# Patient Record
Sex: FEMALE | Race: ASIAN | NOT HISPANIC OR LATINO | Employment: UNEMPLOYED | URBAN - METROPOLITAN AREA
[De-identification: names, ages, dates, MRNs, and addresses within clinical notes are randomized per-mention and may not be internally consistent; named-entity substitution may affect disease eponyms.]

---

## 2017-01-18 ENCOUNTER — OFFICE VISIT - HEALTHEAST (OUTPATIENT)
Dept: FAMILY MEDICINE | Facility: CLINIC | Age: 47
End: 2017-01-18

## 2017-01-18 DIAGNOSIS — K12.1 ORAL ULCER: ICD-10-CM

## 2017-01-18 DIAGNOSIS — J45.41 MODERATE PERSISTENT ASTHMA WITH ACUTE EXACERBATION: ICD-10-CM

## 2017-01-18 ASSESSMENT — MIFFLIN-ST. JEOR: SCORE: 1104.27

## 2017-03-15 ENCOUNTER — OFFICE VISIT - HEALTHEAST (OUTPATIENT)
Dept: FAMILY MEDICINE | Facility: CLINIC | Age: 47
End: 2017-03-15

## 2017-03-15 DIAGNOSIS — J45.40 MODERATE PERSISTENT ASTHMA WITHOUT COMPLICATION: ICD-10-CM

## 2017-03-15 DIAGNOSIS — K21.9 GERD (GASTROESOPHAGEAL REFLUX DISEASE): ICD-10-CM

## 2017-03-15 DIAGNOSIS — I10 ESSENTIAL HYPERTENSION: ICD-10-CM

## 2017-03-15 ASSESSMENT — MIFFLIN-ST. JEOR: SCORE: 1106.88

## 2017-04-14 ENCOUNTER — OFFICE VISIT - HEALTHEAST (OUTPATIENT)
Dept: FAMILY MEDICINE | Facility: CLINIC | Age: 47
End: 2017-04-14

## 2017-04-14 ENCOUNTER — AMBULATORY - HEALTHEAST (OUTPATIENT)
Dept: PULMONOLOGY | Facility: OTHER | Age: 47
End: 2017-04-14

## 2017-04-14 DIAGNOSIS — J45.909 ASTHMA: ICD-10-CM

## 2017-04-14 DIAGNOSIS — R52 PAIN: ICD-10-CM

## 2017-04-14 DIAGNOSIS — R25.2 MUSCLE CRAMPS: ICD-10-CM

## 2017-04-14 DIAGNOSIS — I10 ESSENTIAL HYPERTENSION: ICD-10-CM

## 2017-04-14 ASSESSMENT — MIFFLIN-ST. JEOR: SCORE: 1159.27

## 2017-04-17 ENCOUNTER — RECORDS - HEALTHEAST (OUTPATIENT)
Dept: ADMINISTRATIVE | Facility: OTHER | Age: 47
End: 2017-04-17

## 2017-04-17 ENCOUNTER — RECORDS - HEALTHEAST (OUTPATIENT)
Dept: PULMONOLOGY | Facility: OTHER | Age: 47
End: 2017-04-17

## 2017-04-17 ENCOUNTER — OFFICE VISIT - HEALTHEAST (OUTPATIENT)
Dept: PULMONOLOGY | Facility: OTHER | Age: 47
End: 2017-04-17

## 2017-04-17 DIAGNOSIS — R91.1 PULMONARY NODULE: ICD-10-CM

## 2017-04-17 DIAGNOSIS — R06.09 DYSPNEA ON EXERTION: ICD-10-CM

## 2017-04-17 DIAGNOSIS — J45.909 UNSPECIFIED ASTHMA, UNCOMPLICATED: ICD-10-CM

## 2017-04-17 DIAGNOSIS — J45.40 MODERATE PERSISTENT ASTHMA WITHOUT COMPLICATION: ICD-10-CM

## 2017-04-17 DIAGNOSIS — K21.9 GASTROESOPHAGEAL REFLUX DISEASE, ESOPHAGITIS PRESENCE NOT SPECIFIED: ICD-10-CM

## 2017-04-17 ASSESSMENT — MIFFLIN-ST. JEOR: SCORE: 1136.59

## 2017-04-30 ENCOUNTER — COMMUNICATION - HEALTHEAST (OUTPATIENT)
Dept: FAMILY MEDICINE | Facility: CLINIC | Age: 47
End: 2017-04-30

## 2017-04-30 DIAGNOSIS — J45.41 MODERATE PERSISTENT ASTHMA WITH ACUTE EXACERBATION: ICD-10-CM

## 2017-05-28 ENCOUNTER — COMMUNICATION - HEALTHEAST (OUTPATIENT)
Dept: FAMILY MEDICINE | Facility: CLINIC | Age: 47
End: 2017-05-28

## 2017-05-28 DIAGNOSIS — J45.41 MODERATE PERSISTENT ASTHMA WITH ACUTE EXACERBATION: ICD-10-CM

## 2017-05-31 ENCOUNTER — OFFICE VISIT - HEALTHEAST (OUTPATIENT)
Dept: FAMILY MEDICINE | Facility: CLINIC | Age: 47
End: 2017-05-31

## 2017-05-31 DIAGNOSIS — R52 PAIN: ICD-10-CM

## 2017-05-31 DIAGNOSIS — J45.41 MODERATE PERSISTENT ASTHMA WITH ACUTE EXACERBATION: ICD-10-CM

## 2017-05-31 ASSESSMENT — MIFFLIN-ST. JEOR: SCORE: 1170.16

## 2017-06-13 ENCOUNTER — COMMUNICATION - HEALTHEAST (OUTPATIENT)
Dept: FAMILY MEDICINE | Facility: CLINIC | Age: 47
End: 2017-06-13

## 2017-06-14 ENCOUNTER — RECORDS - HEALTHEAST (OUTPATIENT)
Dept: ADMINISTRATIVE | Facility: OTHER | Age: 47
End: 2017-06-14

## 2017-06-21 ENCOUNTER — OFFICE VISIT - HEALTHEAST (OUTPATIENT)
Dept: FAMILY MEDICINE | Facility: CLINIC | Age: 47
End: 2017-06-21

## 2017-06-21 DIAGNOSIS — K21.9 GERD (GASTROESOPHAGEAL REFLUX DISEASE): ICD-10-CM

## 2017-06-21 DIAGNOSIS — I10 ESSENTIAL HYPERTENSION: ICD-10-CM

## 2017-06-21 DIAGNOSIS — J45.41 MODERATE PERSISTENT ASTHMA WITH ACUTE EXACERBATION: ICD-10-CM

## 2017-06-28 ENCOUNTER — COMMUNICATION - HEALTHEAST (OUTPATIENT)
Dept: FAMILY MEDICINE | Facility: CLINIC | Age: 47
End: 2017-06-28

## 2017-06-28 DIAGNOSIS — J45.41 MODERATE PERSISTENT ASTHMA WITH ACUTE EXACERBATION: ICD-10-CM

## 2017-06-29 ENCOUNTER — COMMUNICATION - HEALTHEAST (OUTPATIENT)
Dept: FAMILY MEDICINE | Facility: CLINIC | Age: 47
End: 2017-06-29

## 2017-06-29 DIAGNOSIS — J45.41 MODERATE PERSISTENT ASTHMA WITH ACUTE EXACERBATION: ICD-10-CM

## 2017-07-03 ENCOUNTER — COMMUNICATION - HEALTHEAST (OUTPATIENT)
Dept: FAMILY MEDICINE | Facility: CLINIC | Age: 47
End: 2017-07-03

## 2017-07-05 ENCOUNTER — AMBULATORY - HEALTHEAST (OUTPATIENT)
Dept: FAMILY MEDICINE | Facility: CLINIC | Age: 47
End: 2017-07-05

## 2017-07-05 DIAGNOSIS — J45.41 MODERATE PERSISTENT ASTHMA WITH ACUTE EXACERBATION: ICD-10-CM

## 2017-07-19 ENCOUNTER — OFFICE VISIT - HEALTHEAST (OUTPATIENT)
Dept: FAMILY MEDICINE | Facility: CLINIC | Age: 47
End: 2017-07-19

## 2017-07-19 DIAGNOSIS — K21.9 GERD (GASTROESOPHAGEAL REFLUX DISEASE): ICD-10-CM

## 2017-07-19 DIAGNOSIS — R73.9 BLOOD GLUCOSE ELEVATED: ICD-10-CM

## 2017-07-19 DIAGNOSIS — R25.2 MUSCLE CRAMPS: ICD-10-CM

## 2017-07-19 LAB — HBA1C MFR BLD: 6.4 % (ref 3.5–6)

## 2017-07-19 ASSESSMENT — MIFFLIN-ST. JEOR: SCORE: 1163.81

## 2017-08-30 ENCOUNTER — COMMUNICATION - HEALTHEAST (OUTPATIENT)
Dept: FAMILY MEDICINE | Facility: CLINIC | Age: 47
End: 2017-08-30

## 2017-08-30 DIAGNOSIS — J45.41 MODERATE PERSISTENT ASTHMA WITH ACUTE EXACERBATION: ICD-10-CM

## 2017-08-31 ENCOUNTER — OFFICE VISIT - HEALTHEAST (OUTPATIENT)
Dept: FAMILY MEDICINE | Facility: CLINIC | Age: 47
End: 2017-08-31

## 2017-08-31 DIAGNOSIS — Z00.00 ROUTINE GENERAL MEDICAL EXAMINATION AT A HEALTH CARE FACILITY: ICD-10-CM

## 2017-08-31 DIAGNOSIS — I10 ESSENTIAL HYPERTENSION: ICD-10-CM

## 2017-08-31 DIAGNOSIS — J40 BRONCHITIS: ICD-10-CM

## 2017-08-31 DIAGNOSIS — Z12.11 SCREENING FOR COLON CANCER: ICD-10-CM

## 2017-08-31 DIAGNOSIS — E87.6 HYPOKALEMIA: ICD-10-CM

## 2017-08-31 DIAGNOSIS — R25.2 MUSCLE CRAMPS: ICD-10-CM

## 2017-08-31 DIAGNOSIS — J45.41 MODERATE PERSISTENT ASTHMA WITH ACUTE EXACERBATION: ICD-10-CM

## 2017-08-31 LAB
CHOLEST SERPL-MCNC: 239 MG/DL
FASTING STATUS PATIENT QL REPORTED: NO
HDLC SERPL-MCNC: 55 MG/DL
LDLC SERPL CALC-MCNC: 139 MG/DL
TRIGL SERPL-MCNC: 227 MG/DL

## 2017-08-31 ASSESSMENT — MIFFLIN-ST. JEOR: SCORE: 1126.18

## 2017-09-09 LAB
HPV INTERPRETATION - HISTORICAL: NORMAL
HPV INTERPRETER - HISTORICAL: NORMAL

## 2017-09-13 ENCOUNTER — COMMUNICATION - HEALTHEAST (OUTPATIENT)
Dept: FAMILY MEDICINE | Facility: CLINIC | Age: 47
End: 2017-09-13

## 2017-09-30 ENCOUNTER — COMMUNICATION - HEALTHEAST (OUTPATIENT)
Dept: FAMILY MEDICINE | Facility: CLINIC | Age: 47
End: 2017-09-30

## 2017-09-30 DIAGNOSIS — I10 ESSENTIAL HYPERTENSION: ICD-10-CM

## 2017-10-04 ENCOUNTER — OFFICE VISIT - HEALTHEAST (OUTPATIENT)
Dept: FAMILY MEDICINE | Facility: CLINIC | Age: 47
End: 2017-10-04

## 2017-10-04 ENCOUNTER — COMMUNICATION - HEALTHEAST (OUTPATIENT)
Dept: FAMILY MEDICINE | Facility: CLINIC | Age: 47
End: 2017-10-04

## 2017-10-04 ENCOUNTER — AMBULATORY - HEALTHEAST (OUTPATIENT)
Dept: LAB | Facility: CLINIC | Age: 47
End: 2017-10-04

## 2017-10-04 DIAGNOSIS — Z12.11 SCREEN FOR COLON CANCER: ICD-10-CM

## 2017-10-04 DIAGNOSIS — R73.9 BLOOD GLUCOSE ELEVATED: ICD-10-CM

## 2017-10-04 DIAGNOSIS — J45.41 MODERATE PERSISTENT ASTHMA WITH ACUTE EXACERBATION: ICD-10-CM

## 2017-10-04 DIAGNOSIS — J45.40 MODERATE PERSISTENT ASTHMA WITHOUT COMPLICATION: ICD-10-CM

## 2017-10-04 DIAGNOSIS — Z23 NEED FOR INFLUENZA VACCINATION: ICD-10-CM

## 2017-10-04 LAB — HBA1C MFR BLD: 6.4 % (ref 3.5–6)

## 2017-10-04 ASSESSMENT — MIFFLIN-ST. JEOR: SCORE: 1122.77

## 2017-11-15 ENCOUNTER — OFFICE VISIT - HEALTHEAST (OUTPATIENT)
Dept: FAMILY MEDICINE | Facility: CLINIC | Age: 47
End: 2017-11-15

## 2017-11-15 DIAGNOSIS — J45.909 ASTHMA WITHOUT STATUS ASTHMATICUS: ICD-10-CM

## 2017-11-15 DIAGNOSIS — I10 HTN (HYPERTENSION): ICD-10-CM

## 2017-11-15 DIAGNOSIS — R52 PAIN: ICD-10-CM

## 2017-11-15 DIAGNOSIS — R73.03 PREDIABETES: ICD-10-CM

## 2017-11-15 ASSESSMENT — MIFFLIN-ST. JEOR: SCORE: 1137.23

## 2017-11-19 ENCOUNTER — HEALTH MAINTENANCE LETTER (OUTPATIENT)
Age: 47
End: 2017-11-19

## 2017-11-24 ENCOUNTER — COMMUNICATION - HEALTHEAST (OUTPATIENT)
Dept: FAMILY MEDICINE | Facility: CLINIC | Age: 47
End: 2017-11-24

## 2017-11-24 DIAGNOSIS — J45.41 MODERATE PERSISTENT ASTHMA WITH ACUTE EXACERBATION: ICD-10-CM

## 2017-11-28 ENCOUNTER — OFFICE VISIT - HEALTHEAST (OUTPATIENT)
Dept: PULMONOLOGY | Facility: OTHER | Age: 47
End: 2017-11-28

## 2017-11-28 DIAGNOSIS — R91.8 MULTIPLE PULMONARY NODULES: ICD-10-CM

## 2017-11-28 DIAGNOSIS — J45.41 MODERATE PERSISTENT ASTHMA WITH ACUTE EXACERBATION: ICD-10-CM

## 2017-11-28 DIAGNOSIS — J45.40 MODERATE PERSISTENT ASTHMA WITHOUT COMPLICATION: ICD-10-CM

## 2017-11-28 DIAGNOSIS — J41.0 SIMPLE CHRONIC BRONCHITIS (H): ICD-10-CM

## 2017-11-29 ENCOUNTER — COMMUNICATION - HEALTHEAST (OUTPATIENT)
Dept: FAMILY MEDICINE | Facility: CLINIC | Age: 47
End: 2017-11-29

## 2017-11-29 ENCOUNTER — AMBULATORY - HEALTHEAST (OUTPATIENT)
Dept: PULMONOLOGY | Facility: OTHER | Age: 47
End: 2017-11-29

## 2017-11-29 DIAGNOSIS — J40 BRONCHITIS: ICD-10-CM

## 2017-12-12 ENCOUNTER — COMMUNICATION - HEALTHEAST (OUTPATIENT)
Dept: FAMILY MEDICINE | Facility: CLINIC | Age: 47
End: 2017-12-12

## 2017-12-12 DIAGNOSIS — K21.9 GERD (GASTROESOPHAGEAL REFLUX DISEASE): ICD-10-CM

## 2017-12-22 ENCOUNTER — COMMUNICATION - HEALTHEAST (OUTPATIENT)
Dept: FAMILY MEDICINE | Facility: CLINIC | Age: 47
End: 2017-12-22

## 2017-12-26 ENCOUNTER — RECORDS - HEALTHEAST (OUTPATIENT)
Dept: ADMINISTRATIVE | Facility: OTHER | Age: 47
End: 2017-12-26

## 2017-12-27 ENCOUNTER — RECORDS - HEALTHEAST (OUTPATIENT)
Dept: ADMINISTRATIVE | Facility: OTHER | Age: 47
End: 2017-12-27

## 2017-12-28 ENCOUNTER — RECORDS - HEALTHEAST (OUTPATIENT)
Dept: ADMINISTRATIVE | Facility: OTHER | Age: 47
End: 2017-12-28

## 2017-12-29 ENCOUNTER — AMBULATORY - HEALTHEAST (OUTPATIENT)
Dept: CARDIOLOGY | Facility: CLINIC | Age: 47
End: 2017-12-29

## 2017-12-29 ENCOUNTER — RECORDS - HEALTHEAST (OUTPATIENT)
Dept: ADMINISTRATIVE | Facility: OTHER | Age: 47
End: 2017-12-29

## 2017-12-29 DIAGNOSIS — I47.10 SVT (SUPRAVENTRICULAR TACHYCARDIA) (H): ICD-10-CM

## 2017-12-29 DIAGNOSIS — R06.02 SOB (SHORTNESS OF BREATH): ICD-10-CM

## 2017-12-29 DIAGNOSIS — R07.9 CHEST PAIN: ICD-10-CM

## 2017-12-31 ENCOUNTER — COMMUNICATION - HEALTHEAST (OUTPATIENT)
Dept: FAMILY MEDICINE | Facility: CLINIC | Age: 47
End: 2017-12-31

## 2018-01-03 ENCOUNTER — OFFICE VISIT - HEALTHEAST (OUTPATIENT)
Dept: FAMILY MEDICINE | Facility: CLINIC | Age: 48
End: 2018-01-03

## 2018-01-03 DIAGNOSIS — J45.909 ASTHMA WITHOUT STATUS ASTHMATICUS: ICD-10-CM

## 2018-01-03 DIAGNOSIS — Z09 HOSPITAL DISCHARGE FOLLOW-UP: ICD-10-CM

## 2018-01-03 DIAGNOSIS — I47.10 SVT (SUPRAVENTRICULAR TACHYCARDIA) (H): ICD-10-CM

## 2018-01-03 DIAGNOSIS — I10 HTN (HYPERTENSION): ICD-10-CM

## 2018-01-03 ASSESSMENT — MIFFLIN-ST. JEOR: SCORE: 1174.3

## 2018-01-07 ENCOUNTER — COMMUNICATION - HEALTHEAST (OUTPATIENT)
Dept: FAMILY MEDICINE | Facility: CLINIC | Age: 48
End: 2018-01-07

## 2018-01-09 ENCOUNTER — COMMUNICATION - HEALTHEAST (OUTPATIENT)
Dept: FAMILY MEDICINE | Facility: CLINIC | Age: 48
End: 2018-01-09

## 2018-01-09 ENCOUNTER — COMMUNICATION - HEALTHEAST (OUTPATIENT)
Dept: CARDIOLOGY | Facility: CLINIC | Age: 48
End: 2018-01-09

## 2018-01-10 ENCOUNTER — OFFICE VISIT - HEALTHEAST (OUTPATIENT)
Dept: FAMILY MEDICINE | Facility: CLINIC | Age: 48
End: 2018-01-10

## 2018-01-10 DIAGNOSIS — R73.03 PREDIABETES: ICD-10-CM

## 2018-01-10 DIAGNOSIS — I47.10 SVT (SUPRAVENTRICULAR TACHYCARDIA) (H): ICD-10-CM

## 2018-01-10 DIAGNOSIS — R00.2 PALPITATION: ICD-10-CM

## 2018-01-10 DIAGNOSIS — J45.909 ASTHMA WITHOUT STATUS ASTHMATICUS: ICD-10-CM

## 2018-01-10 LAB
FASTING STATUS PATIENT QL REPORTED: NO
GLUCOSE BLD-MCNC: 91 MG/DL (ref 74–125)
HBA1C MFR BLD: 6.2 % (ref 3.5–6)

## 2018-01-10 ASSESSMENT — MIFFLIN-ST. JEOR: SCORE: 1170.04

## 2018-01-11 ENCOUNTER — HOSPITAL ENCOUNTER (OUTPATIENT)
Dept: CT IMAGING | Facility: CLINIC | Age: 48
Discharge: HOME | End: 2018-01-11
Attending: INTERNAL MEDICINE

## 2018-01-11 DIAGNOSIS — R07.9 CHEST PAIN: ICD-10-CM

## 2018-01-11 DIAGNOSIS — I47.10 SVT (SUPRAVENTRICULAR TACHYCARDIA) (H): ICD-10-CM

## 2018-01-11 DIAGNOSIS — R06.02 SOB (SHORTNESS OF BREATH): ICD-10-CM

## 2018-01-11 LAB
BSA FOR ECHO PROCEDURE: 1.63 M2
CV CALCIUM SCORE AGATSTON LM: 0
CV CALCIUM SCORING AGATSON LAD: 146
CV CALCIUM SCORING AGATSTON CX: 0
CV CALCIUM SCORING AGATSTON RCA: 58
CV CALCIUM SCORING AGATSTON TOTAL: 204
LEFT VENTRICLE HEART RATE: 80 BPM

## 2018-01-11 ASSESSMENT — MIFFLIN-ST. JEOR: SCORE: 1172.88

## 2018-01-12 ENCOUNTER — COMMUNICATION - HEALTHEAST (OUTPATIENT)
Dept: CARDIOLOGY | Facility: CLINIC | Age: 48
End: 2018-01-12

## 2018-01-12 ENCOUNTER — AMBULATORY - HEALTHEAST (OUTPATIENT)
Dept: CARDIOLOGY | Facility: CLINIC | Age: 48
End: 2018-01-12

## 2018-01-12 DIAGNOSIS — R93.1 ABNORMAL FINDINGS ON DIAGNOSTIC IMAGING OF HEART/CORONARY CIRCULATION: ICD-10-CM

## 2018-01-18 ENCOUNTER — AMBULATORY - HEALTHEAST (OUTPATIENT)
Dept: CARDIOLOGY | Facility: CLINIC | Age: 48
End: 2018-01-18

## 2018-01-18 ENCOUNTER — SURGERY - HEALTHEAST (OUTPATIENT)
Dept: CARDIOLOGY | Facility: CLINIC | Age: 48
End: 2018-01-18

## 2018-01-23 ENCOUNTER — OFFICE VISIT - HEALTHEAST (OUTPATIENT)
Dept: CARDIOLOGY | Facility: CLINIC | Age: 48
End: 2018-01-23

## 2018-01-23 DIAGNOSIS — R07.2 PRECORDIAL PAIN: ICD-10-CM

## 2018-01-23 DIAGNOSIS — E78.5 HYPERLIPIDEMIA: ICD-10-CM

## 2018-01-23 DIAGNOSIS — R93.1 ABNORMAL FINDINGS ON DIAGNOSTIC IMAGING OF HEART/CORONARY CIRCULATION: ICD-10-CM

## 2018-01-23 DIAGNOSIS — I10 ESSENTIAL HYPERTENSION: ICD-10-CM

## 2018-01-23 DIAGNOSIS — I47.10 SVT (SUPRAVENTRICULAR TACHYCARDIA) (H): ICD-10-CM

## 2018-01-23 ASSESSMENT — MIFFLIN-ST. JEOR: SCORE: 1150.2

## 2018-01-24 LAB
ATRIAL RATE - MUSE: 74 BPM
DIASTOLIC BLOOD PRESSURE - MUSE: NORMAL MMHG
INTERPRETATION ECG - MUSE: NORMAL
P AXIS - MUSE: 5 DEGREES
PR INTERVAL - MUSE: 156 MS
QRS DURATION - MUSE: 92 MS
QT - MUSE: 396 MS
QTC - MUSE: 439 MS
R AXIS - MUSE: -8 DEGREES
SYSTOLIC BLOOD PRESSURE - MUSE: NORMAL MMHG
T AXIS - MUSE: 31 DEGREES
VENTRICULAR RATE- MUSE: 74 BPM

## 2018-01-25 ENCOUNTER — SURGERY - HEALTHEAST (OUTPATIENT)
Dept: CARDIOLOGY | Facility: CLINIC | Age: 48
End: 2018-01-25

## 2018-01-25 ASSESSMENT — MIFFLIN-ST. JEOR
SCORE: 1159.27
SCORE: 1158.82

## 2018-01-26 ASSESSMENT — MIFFLIN-ST. JEOR: SCORE: 1162.44

## 2018-01-27 ASSESSMENT — MIFFLIN-ST. JEOR: SCORE: 1152.92

## 2018-01-29 ENCOUNTER — AMBULATORY - HEALTHEAST (OUTPATIENT)
Dept: LAB | Facility: CLINIC | Age: 48
End: 2018-01-29

## 2018-01-29 ENCOUNTER — COMMUNICATION - HEALTHEAST (OUTPATIENT)
Dept: FAMILY MEDICINE | Facility: CLINIC | Age: 48
End: 2018-01-29

## 2018-01-29 DIAGNOSIS — I25.119 CORONARY ARTERY DISEASE INVOLVING NATIVE CORONARY ARTERY OF NATIVE HEART WITH ANGINA PECTORIS (H): ICD-10-CM

## 2018-01-29 LAB — HGB BLD-MCNC: 11.6 G/DL (ref 12–16)

## 2018-02-02 ENCOUNTER — OFFICE VISIT - HEALTHEAST (OUTPATIENT)
Dept: FAMILY MEDICINE | Facility: CLINIC | Age: 48
End: 2018-02-02

## 2018-02-02 ENCOUNTER — COMMUNICATION - HEALTHEAST (OUTPATIENT)
Dept: FAMILY MEDICINE | Facility: CLINIC | Age: 48
End: 2018-02-02

## 2018-02-02 DIAGNOSIS — Z09 HOSPITAL DISCHARGE FOLLOW-UP: ICD-10-CM

## 2018-02-02 DIAGNOSIS — Z95.5 S/P CORONARY ARTERY STENT PLACEMENT: ICD-10-CM

## 2018-02-02 DIAGNOSIS — K68.3 RETROPERITONEAL HEMATOMA: ICD-10-CM

## 2018-02-02 DIAGNOSIS — I25.119 CORONARY ARTERY DISEASE INVOLVING NATIVE CORONARY ARTERY OF NATIVE HEART WITH ANGINA PECTORIS (H): ICD-10-CM

## 2018-02-02 DIAGNOSIS — I47.10 SVT (SUPRAVENTRICULAR TACHYCARDIA) (H): ICD-10-CM

## 2018-02-02 LAB
ERYTHROCYTE [DISTWIDTH] IN BLOOD BY AUTOMATED COUNT: 16.1 % (ref 11–14.5)
HCT VFR BLD AUTO: 36.1 % (ref 35–47)
HGB BLD-MCNC: 11.4 G/DL (ref 12–16)
MCH RBC QN AUTO: 25.6 PG (ref 27–34)
MCHC RBC AUTO-ENTMCNC: 31.6 G/DL (ref 32–36)
MCV RBC AUTO: 81 FL (ref 80–100)
PLATELET # BLD AUTO: 313 THOU/UL (ref 140–440)
PMV BLD AUTO: 8.5 FL (ref 7–10)
RBC # BLD AUTO: 4.46 MILL/UL (ref 3.8–5.4)
WBC: 10.2 THOU/UL (ref 4–11)

## 2018-02-02 ASSESSMENT — MIFFLIN-ST. JEOR: SCORE: 1175.71

## 2018-02-08 ENCOUNTER — OFFICE VISIT - HEALTHEAST (OUTPATIENT)
Dept: CARDIOLOGY | Facility: CLINIC | Age: 48
End: 2018-02-08

## 2018-02-08 ENCOUNTER — AMBULATORY - HEALTHEAST (OUTPATIENT)
Dept: CARDIAC REHAB | Facility: HOSPITAL | Age: 48
End: 2018-02-08

## 2018-02-08 DIAGNOSIS — J44.9 CHRONIC OBSTRUCTIVE PULMONARY DISEASE, UNSPECIFIED COPD TYPE (H): ICD-10-CM

## 2018-02-08 DIAGNOSIS — K21.9 GASTROESOPHAGEAL REFLUX DISEASE, ESOPHAGITIS PRESENCE NOT SPECIFIED: ICD-10-CM

## 2018-02-08 DIAGNOSIS — Z95.5 S/P CORONARY ARTERY STENT PLACEMENT: ICD-10-CM

## 2018-02-08 DIAGNOSIS — I25.119 CORONARY ARTERY DISEASE INVOLVING NATIVE CORONARY ARTERY OF NATIVE HEART WITH ANGINA PECTORIS (H): ICD-10-CM

## 2018-02-08 DIAGNOSIS — Z95.5 STENTED CORONARY ARTERY: ICD-10-CM

## 2018-02-08 DIAGNOSIS — I10 ESSENTIAL HYPERTENSION: ICD-10-CM

## 2018-02-08 ASSESSMENT — MIFFLIN-ST. JEOR: SCORE: 1160.18

## 2018-02-12 ENCOUNTER — AMBULATORY - HEALTHEAST (OUTPATIENT)
Dept: CARDIAC REHAB | Facility: HOSPITAL | Age: 48
End: 2018-02-12

## 2018-02-12 DIAGNOSIS — Z95.5 S/P CORONARY ARTERY STENT PLACEMENT: ICD-10-CM

## 2018-02-14 ENCOUNTER — AMBULATORY - HEALTHEAST (OUTPATIENT)
Dept: CARDIAC REHAB | Facility: HOSPITAL | Age: 48
End: 2018-02-14

## 2018-02-14 ENCOUNTER — COMMUNICATION - HEALTHEAST (OUTPATIENT)
Dept: FAMILY MEDICINE | Facility: CLINIC | Age: 48
End: 2018-02-14

## 2018-02-14 ENCOUNTER — AMBULATORY - HEALTHEAST (OUTPATIENT)
Dept: FAMILY MEDICINE | Facility: CLINIC | Age: 48
End: 2018-02-14

## 2018-02-14 DIAGNOSIS — Z95.5 S/P CORONARY ARTERY STENT PLACEMENT: ICD-10-CM

## 2018-02-14 DIAGNOSIS — J44.9 COPD (CHRONIC OBSTRUCTIVE PULMONARY DISEASE) (H): ICD-10-CM

## 2018-02-16 ENCOUNTER — COMMUNICATION - HEALTHEAST (OUTPATIENT)
Dept: FAMILY MEDICINE | Facility: CLINIC | Age: 48
End: 2018-02-16

## 2018-02-16 DIAGNOSIS — R73.9 HYPERGLYCEMIA: ICD-10-CM

## 2018-02-16 DIAGNOSIS — K68.3 RETROPERITONEAL HEMATOMA: ICD-10-CM

## 2018-02-19 ENCOUNTER — OFFICE VISIT - HEALTHEAST (OUTPATIENT)
Dept: FAMILY MEDICINE | Facility: CLINIC | Age: 48
End: 2018-02-19

## 2018-02-19 DIAGNOSIS — J44.9 CHRONIC OBSTRUCTIVE PULMONARY DISEASE, UNSPECIFIED COPD TYPE (H): ICD-10-CM

## 2018-02-19 DIAGNOSIS — R05.9 COUGH: ICD-10-CM

## 2018-02-19 DIAGNOSIS — J98.01 BRONCHOSPASM: ICD-10-CM

## 2018-02-19 ASSESSMENT — MIFFLIN-ST. JEOR: SCORE: 1159.27

## 2018-02-21 ENCOUNTER — AMBULATORY - HEALTHEAST (OUTPATIENT)
Dept: FAMILY MEDICINE | Facility: CLINIC | Age: 48
End: 2018-02-21

## 2018-02-21 ENCOUNTER — AMBULATORY - HEALTHEAST (OUTPATIENT)
Dept: CARDIAC REHAB | Facility: HOSPITAL | Age: 48
End: 2018-02-21

## 2018-02-21 DIAGNOSIS — Z95.5 STENTED CORONARY ARTERY: ICD-10-CM

## 2018-02-25 ENCOUNTER — COMMUNICATION - HEALTHEAST (OUTPATIENT)
Dept: FAMILY MEDICINE | Facility: CLINIC | Age: 48
End: 2018-02-25

## 2018-02-25 DIAGNOSIS — E11.9 DM TYPE 2 (DIABETES MELLITUS, TYPE 2) (H): ICD-10-CM

## 2018-02-26 ENCOUNTER — COMMUNICATION - HEALTHEAST (OUTPATIENT)
Dept: FAMILY MEDICINE | Facility: CLINIC | Age: 48
End: 2018-02-26

## 2018-02-26 ENCOUNTER — COMMUNICATION - HEALTHEAST (OUTPATIENT)
Dept: CARDIOLOGY | Facility: CLINIC | Age: 48
End: 2018-02-26

## 2018-02-26 ENCOUNTER — AMBULATORY - HEALTHEAST (OUTPATIENT)
Dept: CARDIAC REHAB | Facility: HOSPITAL | Age: 48
End: 2018-02-26

## 2018-02-26 DIAGNOSIS — Z95.5 STENTED CORONARY ARTERY: ICD-10-CM

## 2018-02-28 ENCOUNTER — AMBULATORY - HEALTHEAST (OUTPATIENT)
Dept: CARDIAC REHAB | Facility: HOSPITAL | Age: 48
End: 2018-02-28

## 2018-02-28 DIAGNOSIS — Z95.5 STENTED CORONARY ARTERY: ICD-10-CM

## 2018-03-04 ENCOUNTER — COMMUNICATION - HEALTHEAST (OUTPATIENT)
Dept: FAMILY MEDICINE | Facility: CLINIC | Age: 48
End: 2018-03-04

## 2018-03-04 DIAGNOSIS — K21.9 GASTROESOPHAGEAL REFLUX DISEASE, ESOPHAGITIS PRESENCE NOT SPECIFIED: ICD-10-CM

## 2018-03-07 ENCOUNTER — RECORDS - HEALTHEAST (OUTPATIENT)
Dept: ADMINISTRATIVE | Facility: OTHER | Age: 48
End: 2018-03-07

## 2018-03-07 ENCOUNTER — AMBULATORY - HEALTHEAST (OUTPATIENT)
Dept: CARDIAC REHAB | Facility: HOSPITAL | Age: 48
End: 2018-03-07

## 2018-03-07 DIAGNOSIS — Z95.5 STENTED CORONARY ARTERY: ICD-10-CM

## 2018-03-08 ENCOUNTER — OFFICE VISIT - HEALTHEAST (OUTPATIENT)
Dept: FAMILY MEDICINE | Facility: CLINIC | Age: 48
End: 2018-03-08

## 2018-03-08 DIAGNOSIS — Z95.5 S/P CORONARY ARTERY STENT PLACEMENT: ICD-10-CM

## 2018-03-08 DIAGNOSIS — J45.40 MODERATE PERSISTENT ASTHMA: ICD-10-CM

## 2018-03-08 DIAGNOSIS — R73.03 PREDIABETES: ICD-10-CM

## 2018-03-08 DIAGNOSIS — G43.909 MIGRAINE HEADACHE: ICD-10-CM

## 2018-03-12 ENCOUNTER — AMBULATORY - HEALTHEAST (OUTPATIENT)
Dept: CARDIOLOGY | Facility: CLINIC | Age: 48
End: 2018-03-12

## 2018-03-12 ENCOUNTER — AMBULATORY - HEALTHEAST (OUTPATIENT)
Dept: CARDIAC REHAB | Facility: HOSPITAL | Age: 48
End: 2018-03-12

## 2018-03-12 DIAGNOSIS — Z95.5 STENTED CORONARY ARTERY: ICD-10-CM

## 2018-03-14 ENCOUNTER — AMBULATORY - HEALTHEAST (OUTPATIENT)
Dept: CARDIAC REHAB | Facility: HOSPITAL | Age: 48
End: 2018-03-14

## 2018-03-14 ENCOUNTER — COMMUNICATION - HEALTHEAST (OUTPATIENT)
Dept: FAMILY MEDICINE | Facility: CLINIC | Age: 48
End: 2018-03-14

## 2018-03-14 DIAGNOSIS — Z95.5 STENTED CORONARY ARTERY: ICD-10-CM

## 2018-03-14 DIAGNOSIS — I10 ESSENTIAL HYPERTENSION: ICD-10-CM

## 2018-03-19 ENCOUNTER — OFFICE VISIT - HEALTHEAST (OUTPATIENT)
Dept: CARDIOLOGY | Facility: CLINIC | Age: 48
End: 2018-03-19

## 2018-03-19 ENCOUNTER — AMBULATORY - HEALTHEAST (OUTPATIENT)
Dept: CARDIAC REHAB | Facility: HOSPITAL | Age: 48
End: 2018-03-19

## 2018-03-19 DIAGNOSIS — I10 ESSENTIAL HYPERTENSION: ICD-10-CM

## 2018-03-19 DIAGNOSIS — I25.10 CORONARY ARTERY DISEASE INVOLVING NATIVE CORONARY ARTERY OF NATIVE HEART WITHOUT ANGINA PECTORIS: ICD-10-CM

## 2018-03-19 DIAGNOSIS — E78.5 DYSLIPIDEMIA: ICD-10-CM

## 2018-03-19 DIAGNOSIS — I47.10 SVT (SUPRAVENTRICULAR TACHYCARDIA) (H): ICD-10-CM

## 2018-03-19 DIAGNOSIS — Z95.5 STENTED CORONARY ARTERY: ICD-10-CM

## 2018-03-19 LAB — LDLC SERPL CALC-MCNC: 129 MG/DL

## 2018-03-19 ASSESSMENT — MIFFLIN-ST. JEOR: SCORE: 1168.34

## 2018-03-20 ENCOUNTER — AMBULATORY - HEALTHEAST (OUTPATIENT)
Dept: CARDIOLOGY | Facility: CLINIC | Age: 48
End: 2018-03-20

## 2018-03-20 ENCOUNTER — HOSPITAL ENCOUNTER (OUTPATIENT)
Dept: CARDIOLOGY | Facility: HOSPITAL | Age: 48
Discharge: HOME | End: 2018-03-20
Attending: INTERNAL MEDICINE

## 2018-03-20 DIAGNOSIS — E78.5 DYSLIPIDEMIA: ICD-10-CM

## 2018-03-20 DIAGNOSIS — I47.10 SVT (SUPRAVENTRICULAR TACHYCARDIA) (H): ICD-10-CM

## 2018-03-21 ENCOUNTER — AMBULATORY - HEALTHEAST (OUTPATIENT)
Dept: CARDIAC REHAB | Facility: HOSPITAL | Age: 48
End: 2018-03-21

## 2018-03-21 DIAGNOSIS — Z95.5 STENTED CORONARY ARTERY: ICD-10-CM

## 2018-03-22 ENCOUNTER — COMMUNICATION - HEALTHEAST (OUTPATIENT)
Dept: FAMILY MEDICINE | Facility: CLINIC | Age: 48
End: 2018-03-22

## 2018-03-22 DIAGNOSIS — I25.119 CORONARY ARTERY DISEASE INVOLVING NATIVE CORONARY ARTERY OF NATIVE HEART WITH ANGINA PECTORIS (H): ICD-10-CM

## 2018-03-26 ENCOUNTER — AMBULATORY - HEALTHEAST (OUTPATIENT)
Dept: CARDIAC REHAB | Facility: HOSPITAL | Age: 48
End: 2018-03-26

## 2018-03-26 DIAGNOSIS — Z95.5 STENTED CORONARY ARTERY: ICD-10-CM

## 2018-04-02 ENCOUNTER — AMBULATORY - HEALTHEAST (OUTPATIENT)
Dept: CARDIAC REHAB | Facility: HOSPITAL | Age: 48
End: 2018-04-02

## 2018-04-02 DIAGNOSIS — Z95.5 STENTED CORONARY ARTERY: ICD-10-CM

## 2018-04-03 ENCOUNTER — COMMUNICATION - HEALTHEAST (OUTPATIENT)
Dept: FAMILY MEDICINE | Facility: CLINIC | Age: 48
End: 2018-04-03

## 2018-04-03 DIAGNOSIS — K21.9 GASTROESOPHAGEAL REFLUX DISEASE, ESOPHAGITIS PRESENCE NOT SPECIFIED: ICD-10-CM

## 2018-04-04 ENCOUNTER — AMBULATORY - HEALTHEAST (OUTPATIENT)
Dept: CARDIAC REHAB | Facility: HOSPITAL | Age: 48
End: 2018-04-04

## 2018-04-04 DIAGNOSIS — Z95.5 STENTED CORONARY ARTERY: ICD-10-CM

## 2018-04-06 ENCOUNTER — OFFICE VISIT - HEALTHEAST (OUTPATIENT)
Dept: FAMILY MEDICINE | Facility: CLINIC | Age: 48
End: 2018-04-06

## 2018-04-06 DIAGNOSIS — Z79.899 POLYPHARMACY: ICD-10-CM

## 2018-04-06 DIAGNOSIS — I10 HTN (HYPERTENSION): ICD-10-CM

## 2018-04-06 DIAGNOSIS — I47.10 SVT (SUPRAVENTRICULAR TACHYCARDIA) (H): ICD-10-CM

## 2018-04-06 DIAGNOSIS — Z95.5 S/P CORONARY ARTERY STENT PLACEMENT: ICD-10-CM

## 2018-04-06 DIAGNOSIS — J45.40 MODERATE PERSISTENT ASTHMA: ICD-10-CM

## 2018-04-06 DIAGNOSIS — K21.9 GERD (GASTROESOPHAGEAL REFLUX DISEASE): ICD-10-CM

## 2018-04-06 DIAGNOSIS — E11.9 TYPE 2 DIABETES MELLITUS TREATED WITHOUT INSULIN (H): ICD-10-CM

## 2018-04-06 LAB — HBA1C MFR BLD: 7.4 % (ref 3.5–6)

## 2018-04-06 ASSESSMENT — MIFFLIN-ST. JEOR: SCORE: 1170.04

## 2018-04-09 ENCOUNTER — COMMUNICATION - HEALTHEAST (OUTPATIENT)
Dept: FAMILY MEDICINE | Facility: CLINIC | Age: 48
End: 2018-04-09

## 2018-04-19 ENCOUNTER — COMMUNICATION - HEALTHEAST (OUTPATIENT)
Dept: FAMILY MEDICINE | Facility: CLINIC | Age: 48
End: 2018-04-19

## 2018-04-19 DIAGNOSIS — I10 ESSENTIAL HYPERTENSION: ICD-10-CM

## 2018-04-30 ENCOUNTER — RECORDS - HEALTHEAST (OUTPATIENT)
Dept: ADMINISTRATIVE | Facility: OTHER | Age: 48
End: 2018-04-30

## 2018-05-01 ENCOUNTER — RECORDS - HEALTHEAST (OUTPATIENT)
Dept: ADMINISTRATIVE | Facility: OTHER | Age: 48
End: 2018-05-01

## 2018-05-04 ENCOUNTER — COMMUNICATION - HEALTHEAST (OUTPATIENT)
Dept: FAMILY MEDICINE | Facility: CLINIC | Age: 48
End: 2018-05-04

## 2018-05-04 DIAGNOSIS — R52 PAIN: ICD-10-CM

## 2018-05-04 DIAGNOSIS — J45.909 ASTHMA WITHOUT STATUS ASTHMATICUS: ICD-10-CM

## 2018-05-06 ENCOUNTER — COMMUNICATION - HEALTHEAST (OUTPATIENT)
Dept: FAMILY MEDICINE | Facility: CLINIC | Age: 48
End: 2018-05-06

## 2018-05-10 ENCOUNTER — COMMUNICATION - HEALTHEAST (OUTPATIENT)
Dept: FAMILY MEDICINE | Facility: CLINIC | Age: 48
End: 2018-05-10

## 2018-05-16 ENCOUNTER — RECORDS - HEALTHEAST (OUTPATIENT)
Dept: ADMINISTRATIVE | Facility: OTHER | Age: 48
End: 2018-05-16

## 2018-05-22 ENCOUNTER — COMMUNICATION - HEALTHEAST (OUTPATIENT)
Dept: FAMILY MEDICINE | Facility: CLINIC | Age: 48
End: 2018-05-22

## 2018-06-07 ENCOUNTER — OFFICE VISIT - HEALTHEAST (OUTPATIENT)
Dept: FAMILY MEDICINE | Facility: CLINIC | Age: 48
End: 2018-06-07

## 2018-06-07 DIAGNOSIS — H26.9 CATARACT: ICD-10-CM

## 2018-06-07 DIAGNOSIS — R10.13 EPIGASTRIC PAIN: ICD-10-CM

## 2018-06-07 DIAGNOSIS — Z95.5 S/P CORONARY ARTERY STENT PLACEMENT: ICD-10-CM

## 2018-06-07 DIAGNOSIS — E11.9 TYPE 2 DIABETES MELLITUS TREATED WITHOUT INSULIN (H): ICD-10-CM

## 2018-06-07 DIAGNOSIS — J44.9 COPD (CHRONIC OBSTRUCTIVE PULMONARY DISEASE) (H): ICD-10-CM

## 2018-06-07 DIAGNOSIS — I10 HTN (HYPERTENSION): ICD-10-CM

## 2018-06-07 LAB — HBA1C MFR BLD: 7 % (ref 3.5–6)

## 2018-06-07 ASSESSMENT — MIFFLIN-ST. JEOR: SCORE: 1146.23

## 2018-06-11 LAB
H PYLORI AG STL QL IA: NORMAL
REPORT STATUS: NORMAL
SPECIMEN DESCRIPTION: NORMAL

## 2018-06-12 ENCOUNTER — COMMUNICATION - HEALTHEAST (OUTPATIENT)
Dept: NURSING | Facility: CLINIC | Age: 48
End: 2018-06-12

## 2018-06-13 ENCOUNTER — OFFICE VISIT - HEALTHEAST (OUTPATIENT)
Dept: PULMONOLOGY | Facility: OTHER | Age: 48
End: 2018-06-13

## 2018-06-13 DIAGNOSIS — R05.3 CHRONIC COUGH: ICD-10-CM

## 2018-06-13 DIAGNOSIS — R06.09 DYSPNEA ON EXERTION: ICD-10-CM

## 2018-06-13 DIAGNOSIS — J41.0 SIMPLE CHRONIC BRONCHITIS (H): ICD-10-CM

## 2018-06-14 ENCOUNTER — OFFICE VISIT - HEALTHEAST (OUTPATIENT)
Dept: FAMILY MEDICINE | Facility: CLINIC | Age: 48
End: 2018-06-14

## 2018-06-14 DIAGNOSIS — H26.9 CATARACT: ICD-10-CM

## 2018-06-14 DIAGNOSIS — J45.40 MODERATE PERSISTENT ASTHMA: ICD-10-CM

## 2018-06-14 DIAGNOSIS — E11.9 TYPE 2 DIABETES MELLITUS TREATED WITHOUT INSULIN (H): ICD-10-CM

## 2018-06-14 DIAGNOSIS — Z01.818 PREOPERATIVE EXAMINATION: ICD-10-CM

## 2018-06-14 ASSESSMENT — MIFFLIN-ST. JEOR: SCORE: 1110.01

## 2018-06-15 ENCOUNTER — RECORDS - HEALTHEAST (OUTPATIENT)
Dept: ADMINISTRATIVE | Facility: OTHER | Age: 48
End: 2018-06-15

## 2018-06-15 ENCOUNTER — AMBULATORY - HEALTHEAST (OUTPATIENT)
Dept: NURSING | Facility: CLINIC | Age: 48
End: 2018-06-15

## 2018-06-20 ENCOUNTER — OFFICE VISIT - HEALTHEAST (OUTPATIENT)
Dept: CARDIOLOGY | Facility: CLINIC | Age: 48
End: 2018-06-20

## 2018-06-20 ENCOUNTER — COMMUNICATION - HEALTHEAST (OUTPATIENT)
Dept: FAMILY MEDICINE | Facility: CLINIC | Age: 48
End: 2018-06-20

## 2018-06-20 ENCOUNTER — COMMUNICATION - HEALTHEAST (OUTPATIENT)
Dept: NURSING | Facility: CLINIC | Age: 48
End: 2018-06-20

## 2018-06-20 DIAGNOSIS — I10 ESSENTIAL HYPERTENSION: ICD-10-CM

## 2018-06-20 DIAGNOSIS — E78.5 DYSLIPIDEMIA: ICD-10-CM

## 2018-06-20 DIAGNOSIS — I47.10 SVT (SUPRAVENTRICULAR TACHYCARDIA) (H): ICD-10-CM

## 2018-06-20 DIAGNOSIS — I25.10 CORONARY ARTERY DISEASE INVOLVING NATIVE CORONARY ARTERY OF NATIVE HEART WITHOUT ANGINA PECTORIS: ICD-10-CM

## 2018-06-20 LAB — LDLC SERPL CALC-MCNC: 57 MG/DL

## 2018-06-20 ASSESSMENT — MIFFLIN-ST. JEOR: SCORE: 1110.51

## 2018-06-21 ENCOUNTER — RECORDS - HEALTHEAST (OUTPATIENT)
Dept: ADMINISTRATIVE | Facility: OTHER | Age: 48
End: 2018-06-21

## 2018-06-26 ENCOUNTER — COMMUNICATION - HEALTHEAST (OUTPATIENT)
Dept: NURSING | Facility: CLINIC | Age: 48
End: 2018-06-26

## 2018-06-26 ENCOUNTER — COMMUNICATION - HEALTHEAST (OUTPATIENT)
Dept: FAMILY MEDICINE | Facility: CLINIC | Age: 48
End: 2018-06-26

## 2018-07-03 ENCOUNTER — COMMUNICATION - HEALTHEAST (OUTPATIENT)
Dept: FAMILY MEDICINE | Facility: CLINIC | Age: 48
End: 2018-07-03

## 2018-07-03 ENCOUNTER — COMMUNICATION - HEALTHEAST (OUTPATIENT)
Dept: CARE COORDINATION | Facility: CLINIC | Age: 48
End: 2018-07-03

## 2018-07-03 DIAGNOSIS — I25.119 CORONARY ARTERY DISEASE INVOLVING NATIVE CORONARY ARTERY OF NATIVE HEART WITH ANGINA PECTORIS (H): ICD-10-CM

## 2018-07-10 ENCOUNTER — COMMUNICATION - HEALTHEAST (OUTPATIENT)
Dept: NURSING | Facility: CLINIC | Age: 48
End: 2018-07-10

## 2018-07-17 ENCOUNTER — COMMUNICATION - HEALTHEAST (OUTPATIENT)
Dept: NURSING | Facility: CLINIC | Age: 48
End: 2018-07-17

## 2018-07-18 ENCOUNTER — OFFICE VISIT - HEALTHEAST (OUTPATIENT)
Dept: FAMILY MEDICINE | Facility: CLINIC | Age: 48
End: 2018-07-18

## 2018-07-18 DIAGNOSIS — E11.9 TYPE 2 DIABETES MELLITUS TREATED WITHOUT INSULIN (H): ICD-10-CM

## 2018-07-18 DIAGNOSIS — I10 HTN (HYPERTENSION): ICD-10-CM

## 2018-07-18 DIAGNOSIS — I25.119 CORONARY ARTERY DISEASE INVOLVING NATIVE CORONARY ARTERY OF NATIVE HEART WITH ANGINA PECTORIS (H): ICD-10-CM

## 2018-07-18 DIAGNOSIS — K21.9 GERD (GASTROESOPHAGEAL REFLUX DISEASE): ICD-10-CM

## 2018-07-18 DIAGNOSIS — J45.901 ACUTE ASTHMA EXACERBATION: ICD-10-CM

## 2018-07-18 ASSESSMENT — MIFFLIN-ST. JEOR: SCORE: 1103.7

## 2018-07-24 ENCOUNTER — AMBULATORY - HEALTHEAST (OUTPATIENT)
Dept: NURSING | Facility: CLINIC | Age: 48
End: 2018-07-24

## 2018-08-08 ENCOUNTER — AMBULATORY - HEALTHEAST (OUTPATIENT)
Dept: FAMILY MEDICINE | Facility: CLINIC | Age: 48
End: 2018-08-08

## 2018-08-08 ENCOUNTER — COMMUNICATION - HEALTHEAST (OUTPATIENT)
Dept: FAMILY MEDICINE | Facility: CLINIC | Age: 48
End: 2018-08-08

## 2018-08-09 ENCOUNTER — OFFICE VISIT - HEALTHEAST (OUTPATIENT)
Dept: FAMILY MEDICINE | Facility: CLINIC | Age: 48
End: 2018-08-09

## 2018-08-09 DIAGNOSIS — K21.9 GERD (GASTROESOPHAGEAL REFLUX DISEASE): ICD-10-CM

## 2018-08-09 DIAGNOSIS — J45.40 MODERATE PERSISTENT ASTHMA: ICD-10-CM

## 2018-08-09 DIAGNOSIS — I25.10 CAD (CORONARY ARTERY DISEASE): ICD-10-CM

## 2018-08-09 DIAGNOSIS — Z12.31 ENCOUNTER FOR SCREENING MAMMOGRAM FOR BREAST CANCER: ICD-10-CM

## 2018-08-09 DIAGNOSIS — E11.9 TYPE 2 DIABETES MELLITUS TREATED WITHOUT INSULIN (H): ICD-10-CM

## 2018-08-09 DIAGNOSIS — I10 HTN (HYPERTENSION): ICD-10-CM

## 2018-08-09 DIAGNOSIS — Z12.11 SCREENING FOR COLON CANCER: ICD-10-CM

## 2018-08-09 LAB
CREAT UR-MCNC: 16.3 MG/DL
MICROALBUMIN UR-MCNC: <0.5 MG/DL (ref 0–1.99)
MICROALBUMIN/CREAT UR: NORMAL MG/G

## 2018-08-09 ASSESSMENT — MIFFLIN-ST. JEOR: SCORE: 1107.96

## 2018-08-10 ENCOUNTER — RECORDS - HEALTHEAST (OUTPATIENT)
Dept: MAMMOGRAPHY | Facility: CLINIC | Age: 48
End: 2018-08-10

## 2018-08-10 DIAGNOSIS — Z12.31 ENCOUNTER FOR SCREENING MAMMOGRAM FOR MALIGNANT NEOPLASM OF BREAST: ICD-10-CM

## 2018-08-13 ENCOUNTER — COMMUNICATION - HEALTHEAST (OUTPATIENT)
Dept: FAMILY MEDICINE | Facility: CLINIC | Age: 48
End: 2018-08-13

## 2018-08-14 ENCOUNTER — AMBULATORY - HEALTHEAST (OUTPATIENT)
Dept: FAMILY MEDICINE | Facility: CLINIC | Age: 48
End: 2018-08-14

## 2018-08-14 ENCOUNTER — COMMUNICATION - HEALTHEAST (OUTPATIENT)
Dept: FAMILY MEDICINE | Facility: CLINIC | Age: 48
End: 2018-08-14

## 2018-08-14 ENCOUNTER — COMMUNICATION - HEALTHEAST (OUTPATIENT)
Dept: NURSING | Facility: CLINIC | Age: 48
End: 2018-08-14

## 2018-08-14 ENCOUNTER — COMMUNICATION - HEALTHEAST (OUTPATIENT)
Dept: CARDIOLOGY | Facility: CLINIC | Age: 48
End: 2018-08-14

## 2018-08-14 DIAGNOSIS — J45.901 ACUTE ASTHMA EXACERBATION: ICD-10-CM

## 2018-08-14 DIAGNOSIS — R05.9 COUGH: ICD-10-CM

## 2018-08-14 DIAGNOSIS — Z95.5 S/P CORONARY ARTERY STENT PLACEMENT: ICD-10-CM

## 2018-08-15 ENCOUNTER — RECORDS - HEALTHEAST (OUTPATIENT)
Dept: ADMINISTRATIVE | Facility: OTHER | Age: 48
End: 2018-08-15

## 2018-08-24 ENCOUNTER — COMMUNICATION - HEALTHEAST (OUTPATIENT)
Dept: CARDIOLOGY | Facility: CLINIC | Age: 48
End: 2018-08-24

## 2018-08-31 ENCOUNTER — COMMUNICATION - HEALTHEAST (OUTPATIENT)
Dept: FAMILY MEDICINE | Facility: CLINIC | Age: 48
End: 2018-08-31

## 2018-09-07 ENCOUNTER — OFFICE VISIT - HEALTHEAST (OUTPATIENT)
Dept: FAMILY MEDICINE | Facility: CLINIC | Age: 48
End: 2018-09-07

## 2018-09-07 DIAGNOSIS — Z23 NEED FOR INFLUENZA VACCINATION: ICD-10-CM

## 2018-09-07 DIAGNOSIS — E11.9 TYPE 2 DIABETES MELLITUS TREATED WITHOUT INSULIN (H): ICD-10-CM

## 2018-09-07 DIAGNOSIS — Z01.818 PRE-OP EXAM: ICD-10-CM

## 2018-09-07 DIAGNOSIS — H26.9 CATARACT: ICD-10-CM

## 2018-09-07 DIAGNOSIS — J45.40 MODERATE PERSISTENT ASTHMA WITHOUT COMPLICATION: ICD-10-CM

## 2018-09-07 LAB — HBA1C MFR BLD: 6.4 % (ref 3.5–6)

## 2018-09-07 ASSESSMENT — MIFFLIN-ST. JEOR: SCORE: 1104.06

## 2018-09-10 ENCOUNTER — COMMUNICATION - HEALTHEAST (OUTPATIENT)
Dept: FAMILY MEDICINE | Facility: CLINIC | Age: 48
End: 2018-09-10

## 2018-09-13 ENCOUNTER — COMMUNICATION - HEALTHEAST (OUTPATIENT)
Dept: NURSING | Facility: CLINIC | Age: 48
End: 2018-09-13

## 2018-09-19 ENCOUNTER — COMMUNICATION - HEALTHEAST (OUTPATIENT)
Dept: FAMILY MEDICINE | Facility: CLINIC | Age: 48
End: 2018-09-19

## 2018-09-19 DIAGNOSIS — R73.03 PREDIABETES: ICD-10-CM

## 2018-09-20 ENCOUNTER — OFFICE VISIT - HEALTHEAST (OUTPATIENT)
Dept: FAMILY MEDICINE | Facility: CLINIC | Age: 48
End: 2018-09-20

## 2018-09-20 DIAGNOSIS — E11.9 TYPE 2 DIABETES MELLITUS TREATED WITHOUT INSULIN (H): ICD-10-CM

## 2018-09-20 DIAGNOSIS — I25.10 CAD (CORONARY ARTERY DISEASE): ICD-10-CM

## 2018-09-20 DIAGNOSIS — I10 HTN (HYPERTENSION): ICD-10-CM

## 2018-09-20 DIAGNOSIS — J45.901 ACUTE ASTHMA EXACERBATION: ICD-10-CM

## 2018-09-20 ASSESSMENT — MIFFLIN-ST. JEOR: SCORE: 1105.76

## 2018-09-23 ENCOUNTER — COMMUNICATION - HEALTHEAST (OUTPATIENT)
Dept: FAMILY MEDICINE | Facility: CLINIC | Age: 48
End: 2018-09-23

## 2018-09-23 DIAGNOSIS — I10 ESSENTIAL HYPERTENSION: ICD-10-CM

## 2018-10-01 ENCOUNTER — COMMUNICATION - HEALTHEAST (OUTPATIENT)
Dept: FAMILY MEDICINE | Facility: CLINIC | Age: 48
End: 2018-10-01

## 2018-10-03 ENCOUNTER — RECORDS - HEALTHEAST (OUTPATIENT)
Dept: ADMINISTRATIVE | Facility: OTHER | Age: 48
End: 2018-10-03

## 2018-10-08 ENCOUNTER — COMMUNICATION - HEALTHEAST (OUTPATIENT)
Dept: CARE COORDINATION | Facility: CLINIC | Age: 48
End: 2018-10-08

## 2018-10-09 ENCOUNTER — COMMUNICATION - HEALTHEAST (OUTPATIENT)
Dept: PHARMACY | Facility: CLINIC | Age: 48
End: 2018-10-09

## 2018-10-11 ENCOUNTER — OFFICE VISIT - HEALTHEAST (OUTPATIENT)
Dept: FAMILY MEDICINE | Facility: CLINIC | Age: 48
End: 2018-10-11

## 2018-10-11 DIAGNOSIS — Z95.5 S/P CORONARY ARTERY STENT PLACEMENT: ICD-10-CM

## 2018-10-11 DIAGNOSIS — E11.9 TYPE 2 DIABETES MELLITUS TREATED WITHOUT INSULIN (H): ICD-10-CM

## 2018-10-11 DIAGNOSIS — Z09 HOSPITAL DISCHARGE FOLLOW-UP: ICD-10-CM

## 2018-10-11 DIAGNOSIS — J45.40 MODERATE PERSISTENT ASTHMA: ICD-10-CM

## 2018-10-11 DIAGNOSIS — R10.13 ABDOMINAL PAIN, EPIGASTRIC: ICD-10-CM

## 2018-10-11 ASSESSMENT — MIFFLIN-ST. JEOR: SCORE: 1143.68

## 2018-10-15 ENCOUNTER — COMMUNICATION - HEALTHEAST (OUTPATIENT)
Dept: NURSING | Facility: CLINIC | Age: 48
End: 2018-10-15

## 2018-10-17 ENCOUNTER — OFFICE VISIT - HEALTHEAST (OUTPATIENT)
Dept: PHARMACY | Facility: CLINIC | Age: 48
End: 2018-10-17

## 2018-10-17 DIAGNOSIS — J45.40 MODERATE PERSISTENT ASTHMA WITHOUT COMPLICATION: ICD-10-CM

## 2018-10-17 DIAGNOSIS — E78.5 HYPERLIPIDEMIA LDL GOAL <70: ICD-10-CM

## 2018-10-17 DIAGNOSIS — J44.9 CHRONIC OBSTRUCTIVE PULMONARY DISEASE, UNSPECIFIED COPD TYPE (H): ICD-10-CM

## 2018-10-17 DIAGNOSIS — E11.9 TYPE 2 DIABETES MELLITUS TREATED WITHOUT INSULIN (H): ICD-10-CM

## 2018-10-17 DIAGNOSIS — R52 PAIN: ICD-10-CM

## 2018-10-17 DIAGNOSIS — K21.9 GASTROESOPHAGEAL REFLUX DISEASE WITHOUT ESOPHAGITIS: ICD-10-CM

## 2018-10-17 DIAGNOSIS — I10 ESSENTIAL HYPERTENSION: ICD-10-CM

## 2018-10-17 DIAGNOSIS — I25.10 CORONARY ARTERY DISEASE INVOLVING NATIVE CORONARY ARTERY OF NATIVE HEART WITHOUT ANGINA PECTORIS: ICD-10-CM

## 2018-10-17 DIAGNOSIS — I47.10 SVT (SUPRAVENTRICULAR TACHYCARDIA) (H): ICD-10-CM

## 2018-10-18 ENCOUNTER — COMMUNICATION - HEALTHEAST (OUTPATIENT)
Dept: FAMILY MEDICINE | Facility: CLINIC | Age: 48
End: 2018-10-18

## 2018-10-18 DIAGNOSIS — R52 PAIN: ICD-10-CM

## 2018-10-18 DIAGNOSIS — J45.909 ASTHMA WITHOUT STATUS ASTHMATICUS: ICD-10-CM

## 2018-10-24 ENCOUNTER — RECORDS - HEALTHEAST (OUTPATIENT)
Dept: ADMINISTRATIVE | Facility: OTHER | Age: 48
End: 2018-10-24

## 2018-10-26 ENCOUNTER — RECORDS - HEALTHEAST (OUTPATIENT)
Dept: ADMINISTRATIVE | Facility: OTHER | Age: 48
End: 2018-10-26

## 2018-11-02 ENCOUNTER — AMBULATORY - HEALTHEAST (OUTPATIENT)
Dept: LAB | Facility: CLINIC | Age: 48
End: 2018-11-02

## 2018-11-02 DIAGNOSIS — E78.5 DYSLIPIDEMIA: ICD-10-CM

## 2018-11-02 LAB
ALBUMIN SERPL-MCNC: 3.7 G/DL (ref 3.5–5)
ALP SERPL-CCNC: 128 U/L (ref 45–120)
ALT SERPL W P-5'-P-CCNC: 19 U/L (ref 0–45)
AST SERPL W P-5'-P-CCNC: 19 U/L (ref 0–40)
BILIRUB DIRECT SERPL-MCNC: 0.2 MG/DL
BILIRUB SERPL-MCNC: 0.6 MG/DL (ref 0–1)
CHOLEST SERPL-MCNC: 137 MG/DL
FASTING STATUS PATIENT QL REPORTED: YES
HDLC SERPL-MCNC: 50 MG/DL
LDLC SERPL CALC-MCNC: 41 MG/DL
PROT SERPL-MCNC: 6.7 G/DL (ref 6–8)
TRIGL SERPL-MCNC: 230 MG/DL

## 2018-11-07 ENCOUNTER — OFFICE VISIT - HEALTHEAST (OUTPATIENT)
Dept: FAMILY MEDICINE | Facility: CLINIC | Age: 48
End: 2018-11-07

## 2018-11-07 DIAGNOSIS — J45.40 MODERATE PERSISTENT ASTHMA: ICD-10-CM

## 2018-11-07 DIAGNOSIS — R10.13 EPIGASTRIC PAIN: ICD-10-CM

## 2018-11-07 DIAGNOSIS — E11.9 TYPE 2 DIABETES MELLITUS TREATED WITHOUT INSULIN (H): ICD-10-CM

## 2018-11-07 DIAGNOSIS — Z95.5 S/P CORONARY ARTERY STENT PLACEMENT: ICD-10-CM

## 2018-11-07 DIAGNOSIS — E78.5 HYPERLIPIDEMIA: ICD-10-CM

## 2018-11-07 ASSESSMENT — MIFFLIN-ST. JEOR: SCORE: 1159.55

## 2018-11-14 ENCOUNTER — COMMUNICATION - HEALTHEAST (OUTPATIENT)
Dept: NURSING | Facility: CLINIC | Age: 48
End: 2018-11-14

## 2018-11-16 ENCOUNTER — COMMUNICATION - HEALTHEAST (OUTPATIENT)
Dept: FAMILY MEDICINE | Facility: CLINIC | Age: 48
End: 2018-11-16

## 2018-11-16 DIAGNOSIS — R10.13 ABDOMINAL PAIN, EPIGASTRIC: ICD-10-CM

## 2018-11-22 ENCOUNTER — COMMUNICATION - HEALTHEAST (OUTPATIENT)
Dept: FAMILY MEDICINE | Facility: CLINIC | Age: 48
End: 2018-11-22

## 2018-11-22 DIAGNOSIS — J45.909 ASTHMA: ICD-10-CM

## 2018-11-27 ENCOUNTER — COMMUNICATION - HEALTHEAST (OUTPATIENT)
Dept: FAMILY MEDICINE | Facility: CLINIC | Age: 48
End: 2018-11-27

## 2018-11-27 ENCOUNTER — COMMUNICATION - HEALTHEAST (OUTPATIENT)
Dept: CARE COORDINATION | Facility: CLINIC | Age: 48
End: 2018-11-27

## 2018-11-27 DIAGNOSIS — Z95.5 S/P CORONARY ARTERY STENT PLACEMENT: ICD-10-CM

## 2018-11-28 ENCOUNTER — OFFICE VISIT - HEALTHEAST (OUTPATIENT)
Dept: FAMILY MEDICINE | Facility: CLINIC | Age: 48
End: 2018-11-28

## 2018-11-28 DIAGNOSIS — Z95.5 S/P CORONARY ARTERY STENT PLACEMENT: ICD-10-CM

## 2018-11-28 DIAGNOSIS — J44.1 COPD EXACERBATION (H): ICD-10-CM

## 2018-11-28 DIAGNOSIS — I25.119 CORONARY ARTERY DISEASE INVOLVING NATIVE CORONARY ARTERY OF NATIVE HEART WITH ANGINA PECTORIS (H): ICD-10-CM

## 2018-11-28 DIAGNOSIS — J18.9 PNEUMONIA DUE TO INFECTIOUS ORGANISM, UNSPECIFIED LATERALITY, UNSPECIFIED PART OF LUNG: ICD-10-CM

## 2018-11-28 DIAGNOSIS — E87.20 LACTIC ACIDOSIS: ICD-10-CM

## 2018-11-28 DIAGNOSIS — E11.9 TYPE 2 DIABETES MELLITUS TREATED WITHOUT INSULIN (H): ICD-10-CM

## 2018-11-28 DIAGNOSIS — G47.00 INSOMNIA, UNSPECIFIED TYPE: ICD-10-CM

## 2018-11-28 DIAGNOSIS — Z09 HOSPITAL DISCHARGE FOLLOW-UP: ICD-10-CM

## 2018-11-28 LAB
HBA1C MFR BLD: 6.8 % (ref 3.5–6)
LACTATE SERPL-SCNC: 2.1 MMOL/L (ref 0.5–2.2)

## 2018-11-28 ASSESSMENT — MIFFLIN-ST. JEOR: SCORE: 1160.97

## 2018-11-30 ENCOUNTER — COMMUNICATION - HEALTHEAST (OUTPATIENT)
Dept: PHARMACY | Facility: CLINIC | Age: 48
End: 2018-11-30

## 2018-12-05 ENCOUNTER — COMMUNICATION - HEALTHEAST (OUTPATIENT)
Dept: PHARMACY | Facility: CLINIC | Age: 48
End: 2018-12-05

## 2018-12-05 DIAGNOSIS — J45.41 MODERATE PERSISTENT ASTHMA WITHOUT STATUS ASTHMATICUS WITH ACUTE EXACERBATION: ICD-10-CM

## 2018-12-05 DIAGNOSIS — E11.9 TYPE 2 DIABETES MELLITUS TREATED WITHOUT INSULIN (H): ICD-10-CM

## 2018-12-07 ENCOUNTER — COMMUNICATION - HEALTHEAST (OUTPATIENT)
Dept: SCHEDULING | Facility: CLINIC | Age: 48
End: 2018-12-07

## 2018-12-07 ASSESSMENT — MIFFLIN-ST. JEOR: SCORE: 1149.29

## 2018-12-08 ASSESSMENT — MIFFLIN-ST. JEOR: SCORE: 1165.16

## 2018-12-09 ASSESSMENT — MIFFLIN-ST. JEOR
SCORE: 1169.24
SCORE: 1152.01

## 2018-12-10 ENCOUNTER — SURGERY - HEALTHEAST (OUTPATIENT)
Dept: GASTROENTEROLOGY | Facility: HOSPITAL | Age: 48
End: 2018-12-10

## 2018-12-10 ENCOUNTER — COMMUNICATION - HEALTHEAST (OUTPATIENT)
Dept: FAMILY MEDICINE | Facility: CLINIC | Age: 48
End: 2018-12-10

## 2018-12-10 DIAGNOSIS — R73.03 PREDIABETES: ICD-10-CM

## 2018-12-11 ASSESSMENT — MIFFLIN-ST. JEOR: SCORE: 1157

## 2018-12-12 ENCOUNTER — COMMUNICATION - HEALTHEAST (OUTPATIENT)
Dept: CARE COORDINATION | Facility: CLINIC | Age: 48
End: 2018-12-12

## 2018-12-14 ENCOUNTER — OFFICE VISIT - HEALTHEAST (OUTPATIENT)
Dept: FAMILY MEDICINE | Facility: CLINIC | Age: 48
End: 2018-12-14

## 2018-12-14 ENCOUNTER — COMMUNICATION - HEALTHEAST (OUTPATIENT)
Dept: FAMILY MEDICINE | Facility: CLINIC | Age: 48
End: 2018-12-14

## 2018-12-14 DIAGNOSIS — K62.5 RECTAL BLEEDING: ICD-10-CM

## 2018-12-14 DIAGNOSIS — K21.9 GERD (GASTROESOPHAGEAL REFLUX DISEASE): ICD-10-CM

## 2018-12-14 DIAGNOSIS — E11.9 TYPE 2 DIABETES MELLITUS TREATED WITHOUT INSULIN (H): ICD-10-CM

## 2018-12-14 DIAGNOSIS — J45.40 MODERATE PERSISTENT ASTHMA WITHOUT COMPLICATION: ICD-10-CM

## 2018-12-14 DIAGNOSIS — Z09 HOSPITAL DISCHARGE FOLLOW-UP: ICD-10-CM

## 2018-12-14 DIAGNOSIS — D64.9 ANEMIA, UNSPECIFIED TYPE: ICD-10-CM

## 2018-12-14 DIAGNOSIS — I25.119 CORONARY ARTERY DISEASE INVOLVING NATIVE CORONARY ARTERY OF NATIVE HEART WITH ANGINA PECTORIS (H): ICD-10-CM

## 2018-12-14 LAB — HGB BLD-MCNC: 10.9 G/DL (ref 12–16)

## 2018-12-14 ASSESSMENT — MIFFLIN-ST. JEOR: SCORE: 1163.52

## 2018-12-19 ENCOUNTER — RECORDS - HEALTHEAST (OUTPATIENT)
Dept: ADMINISTRATIVE | Facility: OTHER | Age: 48
End: 2018-12-19

## 2018-12-21 ENCOUNTER — OFFICE VISIT - HEALTHEAST (OUTPATIENT)
Dept: PHARMACY | Facility: CLINIC | Age: 48
End: 2018-12-21

## 2018-12-21 ENCOUNTER — AMBULATORY - HEALTHEAST (OUTPATIENT)
Dept: LAB | Facility: CLINIC | Age: 48
End: 2018-12-21

## 2018-12-21 ENCOUNTER — COMMUNICATION - HEALTHEAST (OUTPATIENT)
Dept: FAMILY MEDICINE | Facility: CLINIC | Age: 48
End: 2018-12-21

## 2018-12-21 DIAGNOSIS — K62.5 RECTAL BLEED: ICD-10-CM

## 2018-12-21 DIAGNOSIS — R31.9 HEMATURIA, UNSPECIFIED TYPE: ICD-10-CM

## 2018-12-21 DIAGNOSIS — E11.9 TYPE 2 DIABETES MELLITUS TREATED WITHOUT INSULIN (H): ICD-10-CM

## 2018-12-21 DIAGNOSIS — R10.13 ABDOMINAL PAIN, EPIGASTRIC: ICD-10-CM

## 2018-12-21 DIAGNOSIS — K21.9 GASTROESOPHAGEAL REFLUX DISEASE WITHOUT ESOPHAGITIS: ICD-10-CM

## 2018-12-21 LAB
ALBUMIN UR-MCNC: NEGATIVE MG/DL
APPEARANCE UR: CLEAR
BILIRUB UR QL STRIP: NEGATIVE
COLOR UR AUTO: YELLOW
GLUCOSE UR STRIP-MCNC: NEGATIVE MG/DL
HGB UR QL STRIP: NEGATIVE
KETONES UR STRIP-MCNC: NEGATIVE MG/DL
LEUKOCYTE ESTERASE UR QL STRIP: NEGATIVE
NITRATE UR QL: NEGATIVE
PH UR STRIP: 6.5 [PH] (ref 5–8)
SP GR UR STRIP: 1.01 (ref 1–1.03)
UROBILINOGEN UR STRIP-ACNC: NORMAL

## 2018-12-24 ENCOUNTER — COMMUNICATION - HEALTHEAST (OUTPATIENT)
Dept: PULMONOLOGY | Facility: OTHER | Age: 48
End: 2018-12-24

## 2018-12-24 DIAGNOSIS — J45.41 MODERATE PERSISTENT ASTHMA WITH ACUTE EXACERBATION: ICD-10-CM

## 2019-01-10 ENCOUNTER — COMMUNICATION - HEALTHEAST (OUTPATIENT)
Dept: FAMILY MEDICINE | Facility: CLINIC | Age: 49
End: 2019-01-10

## 2019-01-11 ENCOUNTER — OFFICE VISIT - HEALTHEAST (OUTPATIENT)
Dept: FAMILY MEDICINE | Facility: CLINIC | Age: 49
End: 2019-01-11

## 2019-01-11 DIAGNOSIS — D64.9 ANEMIA, UNSPECIFIED TYPE: ICD-10-CM

## 2019-01-11 DIAGNOSIS — I10 ESSENTIAL HYPERTENSION: ICD-10-CM

## 2019-01-11 DIAGNOSIS — R06.02 SHORTNESS OF BREATH: ICD-10-CM

## 2019-01-11 DIAGNOSIS — I25.10 CORONARY ARTERY DISEASE INVOLVING NATIVE CORONARY ARTERY OF NATIVE HEART WITHOUT ANGINA PECTORIS: ICD-10-CM

## 2019-01-11 DIAGNOSIS — M25.562 PAIN IN BOTH KNEES, UNSPECIFIED CHRONICITY: ICD-10-CM

## 2019-01-11 DIAGNOSIS — R55 SYNCOPE, UNSPECIFIED SYNCOPE TYPE: ICD-10-CM

## 2019-01-11 DIAGNOSIS — I47.10 SVT (SUPRAVENTRICULAR TACHYCARDIA) (H): ICD-10-CM

## 2019-01-11 DIAGNOSIS — E11.9 TYPE 2 DIABETES MELLITUS TREATED WITHOUT INSULIN (H): ICD-10-CM

## 2019-01-11 DIAGNOSIS — M25.561 PAIN IN BOTH KNEES, UNSPECIFIED CHRONICITY: ICD-10-CM

## 2019-01-11 LAB
ALBUMIN SERPL-MCNC: 3.7 G/DL (ref 3.5–5)
ALP SERPL-CCNC: 105 U/L (ref 45–120)
ALT SERPL W P-5'-P-CCNC: 16 U/L (ref 0–45)
ANION GAP SERPL CALCULATED.3IONS-SCNC: 13 MMOL/L (ref 5–18)
AST SERPL W P-5'-P-CCNC: 17 U/L (ref 0–40)
BILIRUB SERPL-MCNC: 0.4 MG/DL (ref 0–1)
BUN SERPL-MCNC: 9 MG/DL (ref 8–22)
CALCIUM SERPL-MCNC: 9.7 MG/DL (ref 8.5–10.5)
CHLORIDE BLD-SCNC: 105 MMOL/L (ref 98–107)
CO2 SERPL-SCNC: 23 MMOL/L (ref 22–31)
CREAT SERPL-MCNC: 0.7 MG/DL (ref 0.6–1.1)
ERYTHROCYTE [DISTWIDTH] IN BLOOD BY AUTOMATED COUNT: 14.6 % (ref 11–14.5)
GFR SERPL CREATININE-BSD FRML MDRD: >60 ML/MIN/1.73M2
GLUCOSE BLD-MCNC: 133 MG/DL (ref 70–125)
HCT VFR BLD AUTO: 34.5 % (ref 35–47)
HGB BLD-MCNC: 11 G/DL (ref 12–16)
MCH RBC QN AUTO: 24.7 PG (ref 27–34)
MCHC RBC AUTO-ENTMCNC: 31.8 G/DL (ref 32–36)
MCV RBC AUTO: 78 FL (ref 80–100)
PLATELET # BLD AUTO: 236 THOU/UL (ref 140–440)
PMV BLD AUTO: 9.2 FL (ref 7–10)
POTASSIUM BLD-SCNC: 4.1 MMOL/L (ref 3.5–5)
PROT SERPL-MCNC: 6.7 G/DL (ref 6–8)
RBC # BLD AUTO: 4.44 MILL/UL (ref 3.8–5.4)
SODIUM SERPL-SCNC: 141 MMOL/L (ref 136–145)
WBC: 6.5 THOU/UL (ref 4–11)

## 2019-01-11 ASSESSMENT — MIFFLIN-ST. JEOR: SCORE: 1177.12

## 2019-01-14 ENCOUNTER — COMMUNICATION - HEALTHEAST (OUTPATIENT)
Dept: FAMILY MEDICINE | Facility: CLINIC | Age: 49
End: 2019-01-14

## 2019-01-14 ENCOUNTER — OFFICE VISIT - HEALTHEAST (OUTPATIENT)
Dept: CARDIOLOGY | Facility: CLINIC | Age: 49
End: 2019-01-14

## 2019-01-14 DIAGNOSIS — R05.9 COUGH: ICD-10-CM

## 2019-01-14 DIAGNOSIS — R42 DIZZINESS: ICD-10-CM

## 2019-01-14 DIAGNOSIS — I10 ESSENTIAL HYPERTENSION: ICD-10-CM

## 2019-01-14 LAB
ATRIAL RATE - MUSE: 82 BPM
DIASTOLIC BLOOD PRESSURE - MUSE: NORMAL MMHG
INTERPRETATION ECG - MUSE: NORMAL
P AXIS - MUSE: 48 DEGREES
PR INTERVAL - MUSE: 150 MS
QRS DURATION - MUSE: 86 MS
QT - MUSE: 398 MS
QTC - MUSE: 464 MS
R AXIS - MUSE: -7 DEGREES
SYSTOLIC BLOOD PRESSURE - MUSE: NORMAL MMHG
T AXIS - MUSE: 36 DEGREES
VENTRICULAR RATE- MUSE: 82 BPM

## 2019-01-14 ASSESSMENT — MIFFLIN-ST. JEOR: SCORE: 1175.14

## 2019-01-17 ENCOUNTER — AMBULATORY - HEALTHEAST (OUTPATIENT)
Dept: FAMILY MEDICINE | Facility: CLINIC | Age: 49
End: 2019-01-17

## 2019-01-17 DIAGNOSIS — R05.9 COUGH: ICD-10-CM

## 2019-01-18 ENCOUNTER — OFFICE VISIT - HEALTHEAST (OUTPATIENT)
Dept: FAMILY MEDICINE | Facility: CLINIC | Age: 49
End: 2019-01-18

## 2019-01-18 DIAGNOSIS — J40 BRONCHITIS: ICD-10-CM

## 2019-01-18 ASSESSMENT — MIFFLIN-ST. JEOR: SCORE: 1171.74

## 2019-01-22 ENCOUNTER — HOSPITAL ENCOUNTER (OUTPATIENT)
Dept: CARDIOLOGY | Facility: HOSPITAL | Age: 49
Discharge: HOME | End: 2019-01-22
Attending: INTERNAL MEDICINE

## 2019-01-22 ENCOUNTER — HOSPITAL ENCOUNTER (OUTPATIENT)
Dept: NUCLEAR MEDICINE | Facility: HOSPITAL | Age: 49
Discharge: HOME | End: 2019-01-22
Attending: INTERNAL MEDICINE

## 2019-01-22 ENCOUNTER — COMMUNICATION - HEALTHEAST (OUTPATIENT)
Dept: CARDIOLOGY | Facility: CLINIC | Age: 49
End: 2019-01-22

## 2019-01-22 DIAGNOSIS — R42 DIZZINESS: ICD-10-CM

## 2019-01-22 LAB
CV STRESS CURRENT BP HE: NORMAL
CV STRESS CURRENT HR HE: 100
CV STRESS CURRENT HR HE: 101
CV STRESS CURRENT HR HE: 105
CV STRESS CURRENT HR HE: 106
CV STRESS CURRENT HR HE: 109
CV STRESS CURRENT HR HE: 109
CV STRESS CURRENT HR HE: 117
CV STRESS CURRENT HR HE: 120
CV STRESS CURRENT HR HE: 70
CV STRESS CURRENT HR HE: 73
CV STRESS CURRENT HR HE: 87
CV STRESS CURRENT HR HE: 88
CV STRESS CURRENT HR HE: 89
CV STRESS CURRENT HR HE: 94
CV STRESS CURRENT HR HE: 98
CV STRESS CURRENT HR HE: 99
CV STRESS DEVIATION TIME HE: NORMAL
CV STRESS ECHO PERCENT HR HE: NORMAL
CV STRESS EXERCISE STAGE HE: NORMAL
CV STRESS FINAL RESTING BP HE: NORMAL
CV STRESS FINAL RESTING HR HE: 87
CV STRESS MAX HR HE: 120
CV STRESS MAX TREADMILL GRADE HE: 0
CV STRESS MAX TREADMILL SPEED HE: 0
CV STRESS PEAK DIA BP HE: NORMAL
CV STRESS PEAK SYS BP HE: NORMAL
CV STRESS PHASE HE: NORMAL
CV STRESS PROTOCOL HE: NORMAL
CV STRESS RESTING PT POSITION HE: NORMAL
CV STRESS ST DEVIATION AMOUNT HE: NORMAL
CV STRESS ST DEVIATION ELEVATION HE: NORMAL
CV STRESS ST EVELATION AMOUNT HE: NORMAL
CV STRESS TEST TYPE HE: NORMAL
CV STRESS TOTAL STAGE TIME MIN 1 HE: NORMAL
NUC STRESS EJECTION FRACTION: >70 %
STRESS ECHO BASELINE BP: NORMAL
STRESS ECHO BASELINE HR: 70
STRESS ECHO CALCULATED PERCENT HR: 71 %
STRESS ECHO LAST STRESS BP: NORMAL
STRESS ECHO LAST STRESS HR: 105

## 2019-01-22 ASSESSMENT — MIFFLIN-ST. JEOR: SCORE: 1161.53

## 2019-02-02 ENCOUNTER — COMMUNICATION - HEALTHEAST (OUTPATIENT)
Dept: FAMILY MEDICINE | Facility: CLINIC | Age: 49
End: 2019-02-02

## 2019-02-02 DIAGNOSIS — R10.13 ABDOMINAL PAIN, EPIGASTRIC: ICD-10-CM

## 2019-02-06 ENCOUNTER — HOSPITAL ENCOUNTER (OUTPATIENT)
Dept: PHYSICAL THERAPY | Age: 49
Setting detail: THERAPIES SERIES
Discharge: STILL ADMITTED OR ADMINISTRATIVE | End: 2019-02-06
Attending: FAMILY MEDICINE

## 2019-02-06 DIAGNOSIS — R26.81 GAIT INSTABILITY: ICD-10-CM

## 2019-02-06 DIAGNOSIS — R29.898 MUSCULAR DECONDITIONING: ICD-10-CM

## 2019-02-06 DIAGNOSIS — Z91.81 AT HIGH RISK FOR FALLS: ICD-10-CM

## 2019-02-07 ENCOUNTER — COMMUNICATION - HEALTHEAST (OUTPATIENT)
Dept: NURSING | Facility: CLINIC | Age: 49
End: 2019-02-07

## 2019-02-07 ENCOUNTER — AMBULATORY - HEALTHEAST (OUTPATIENT)
Dept: CARE COORDINATION | Facility: CLINIC | Age: 49
End: 2019-02-07

## 2019-02-08 ENCOUNTER — COMMUNICATION - HEALTHEAST (OUTPATIENT)
Dept: FAMILY MEDICINE | Facility: CLINIC | Age: 49
End: 2019-02-08

## 2019-02-08 ENCOUNTER — OFFICE VISIT - HEALTHEAST (OUTPATIENT)
Dept: FAMILY MEDICINE | Facility: CLINIC | Age: 49
End: 2019-02-08

## 2019-02-08 DIAGNOSIS — R05.9 COUGH: ICD-10-CM

## 2019-02-08 DIAGNOSIS — E11.9 TYPE 2 DIABETES MELLITUS TREATED WITHOUT INSULIN (H): ICD-10-CM

## 2019-02-08 DIAGNOSIS — J40 BRONCHITIS: ICD-10-CM

## 2019-02-08 DIAGNOSIS — Z95.5 S/P CORONARY ARTERY STENT PLACEMENT: ICD-10-CM

## 2019-02-08 DIAGNOSIS — I25.119 CORONARY ARTERY DISEASE INVOLVING NATIVE CORONARY ARTERY OF NATIVE HEART WITH ANGINA PECTORIS (H): ICD-10-CM

## 2019-02-08 DIAGNOSIS — R73.03 PREDIABETES: ICD-10-CM

## 2019-02-08 ASSESSMENT — MIFFLIN-ST. JEOR: SCORE: 1181.38

## 2019-02-11 ENCOUNTER — COMMUNICATION - HEALTHEAST (OUTPATIENT)
Dept: FAMILY MEDICINE | Facility: CLINIC | Age: 49
End: 2019-02-11

## 2019-02-15 ENCOUNTER — RECORDS - HEALTHEAST (OUTPATIENT)
Dept: ADMINISTRATIVE | Facility: OTHER | Age: 49
End: 2019-02-15

## 2019-02-19 ENCOUNTER — COMMUNICATION - HEALTHEAST (OUTPATIENT)
Dept: FAMILY MEDICINE | Facility: CLINIC | Age: 49
End: 2019-02-19

## 2019-02-19 ENCOUNTER — AMBULATORY - HEALTHEAST (OUTPATIENT)
Dept: FAMILY MEDICINE | Facility: CLINIC | Age: 49
End: 2019-02-19

## 2019-02-19 DIAGNOSIS — I25.119 CORONARY ARTERY DISEASE INVOLVING NATIVE CORONARY ARTERY OF NATIVE HEART WITH ANGINA PECTORIS (H): ICD-10-CM

## 2019-02-26 ENCOUNTER — OFFICE VISIT - HEALTHEAST (OUTPATIENT)
Dept: CARDIOLOGY | Facility: CLINIC | Age: 49
End: 2019-02-26

## 2019-02-26 DIAGNOSIS — I25.10 CORONARY ARTERY DISEASE INVOLVING NATIVE CORONARY ARTERY OF NATIVE HEART WITHOUT ANGINA PECTORIS: ICD-10-CM

## 2019-02-26 DIAGNOSIS — I10 ESSENTIAL HYPERTENSION: ICD-10-CM

## 2019-02-26 DIAGNOSIS — I47.10 SVT (SUPRAVENTRICULAR TACHYCARDIA) (H): ICD-10-CM

## 2019-02-26 DIAGNOSIS — E78.5 DYSLIPIDEMIA: ICD-10-CM

## 2019-02-26 ASSESSMENT — MIFFLIN-ST. JEOR: SCORE: 1179.68

## 2019-02-28 ENCOUNTER — OFFICE VISIT - HEALTHEAST (OUTPATIENT)
Dept: PULMONOLOGY | Facility: OTHER | Age: 49
End: 2019-02-28

## 2019-02-28 DIAGNOSIS — J45.31 MILD PERSISTENT ASTHMA WITH EXACERBATION: ICD-10-CM

## 2019-03-05 ENCOUNTER — COMMUNICATION - HEALTHEAST (OUTPATIENT)
Dept: FAMILY MEDICINE | Facility: CLINIC | Age: 49
End: 2019-03-05

## 2019-03-05 DIAGNOSIS — I25.119 CORONARY ARTERY DISEASE INVOLVING NATIVE CORONARY ARTERY OF NATIVE HEART WITH ANGINA PECTORIS (H): ICD-10-CM

## 2019-03-08 ENCOUNTER — COMMUNICATION - HEALTHEAST (OUTPATIENT)
Dept: FAMILY MEDICINE | Facility: CLINIC | Age: 49
End: 2019-03-08

## 2019-03-08 DIAGNOSIS — K21.9 GERD (GASTROESOPHAGEAL REFLUX DISEASE): ICD-10-CM

## 2019-03-08 DIAGNOSIS — R10.13 ABDOMINAL PAIN, EPIGASTRIC: ICD-10-CM

## 2019-03-11 ENCOUNTER — COMMUNICATION - HEALTHEAST (OUTPATIENT)
Dept: PULMONOLOGY | Facility: OTHER | Age: 49
End: 2019-03-11

## 2019-03-14 ENCOUNTER — OFFICE VISIT - HEALTHEAST (OUTPATIENT)
Dept: FAMILY MEDICINE | Facility: CLINIC | Age: 49
End: 2019-03-14

## 2019-03-14 DIAGNOSIS — K64.9 HEMORRHOIDS, UNSPECIFIED HEMORRHOID TYPE: ICD-10-CM

## 2019-03-14 DIAGNOSIS — J45.40 MODERATE PERSISTENT ASTHMA WITHOUT COMPLICATION: ICD-10-CM

## 2019-03-14 DIAGNOSIS — E78.5 HYPERLIPIDEMIA, UNSPECIFIED HYPERLIPIDEMIA TYPE: ICD-10-CM

## 2019-03-14 DIAGNOSIS — I47.10 SVT (SUPRAVENTRICULAR TACHYCARDIA) (H): ICD-10-CM

## 2019-03-14 DIAGNOSIS — E11.9 TYPE 2 DIABETES MELLITUS TREATED WITHOUT INSULIN (H): ICD-10-CM

## 2019-03-14 DIAGNOSIS — K21.9 GASTROESOPHAGEAL REFLUX DISEASE WITHOUT ESOPHAGITIS: ICD-10-CM

## 2019-03-14 DIAGNOSIS — I25.10 CORONARY ARTERY DISEASE INVOLVING NATIVE CORONARY ARTERY OF NATIVE HEART WITHOUT ANGINA PECTORIS: ICD-10-CM

## 2019-03-14 LAB
CHOLEST SERPL-MCNC: 133 MG/DL
FASTING STATUS PATIENT QL REPORTED: NO
HBA1C MFR BLD: 6.3 % (ref 3.5–6)
HDLC SERPL-MCNC: 44 MG/DL
LDLC SERPL CALC-MCNC: 59 MG/DL
TRIGL SERPL-MCNC: 152 MG/DL

## 2019-03-14 ASSESSMENT — MIFFLIN-ST. JEOR: SCORE: 1172.59

## 2019-03-15 ENCOUNTER — COMMUNICATION - HEALTHEAST (OUTPATIENT)
Dept: FAMILY MEDICINE | Facility: CLINIC | Age: 49
End: 2019-03-15

## 2019-03-15 DIAGNOSIS — R10.13 ABDOMINAL PAIN, EPIGASTRIC: ICD-10-CM

## 2019-03-27 ENCOUNTER — COMMUNICATION - HEALTHEAST (OUTPATIENT)
Dept: SCHEDULING | Facility: CLINIC | Age: 49
End: 2019-03-27

## 2019-03-29 ENCOUNTER — COMMUNICATION - HEALTHEAST (OUTPATIENT)
Dept: FAMILY MEDICINE | Facility: CLINIC | Age: 49
End: 2019-03-29

## 2019-03-29 ENCOUNTER — OFFICE VISIT - HEALTHEAST (OUTPATIENT)
Dept: FAMILY MEDICINE | Facility: CLINIC | Age: 49
End: 2019-03-29

## 2019-03-29 DIAGNOSIS — J45.41 MODERATE PERSISTENT ASTHMA WITH EXACERBATION: ICD-10-CM

## 2019-03-29 DIAGNOSIS — R10.13 ABDOMINAL PAIN, EPIGASTRIC: ICD-10-CM

## 2019-03-29 DIAGNOSIS — J45.40 MODERATE PERSISTENT ASTHMA WITHOUT COMPLICATION: ICD-10-CM

## 2019-03-29 ASSESSMENT — MIFFLIN-ST. JEOR: SCORE: 1149.06

## 2019-04-03 ENCOUNTER — COMMUNICATION - HEALTHEAST (OUTPATIENT)
Dept: FAMILY MEDICINE | Facility: CLINIC | Age: 49
End: 2019-04-03

## 2019-04-03 DIAGNOSIS — R73.9 HYPERGLYCEMIA: ICD-10-CM

## 2019-04-29 ENCOUNTER — AMBULATORY - HEALTHEAST (OUTPATIENT)
Dept: CARE COORDINATION | Facility: CLINIC | Age: 49
End: 2019-04-29

## 2019-04-29 ENCOUNTER — COMMUNICATION - HEALTHEAST (OUTPATIENT)
Dept: NURSING | Facility: CLINIC | Age: 49
End: 2019-04-29

## 2019-04-30 ENCOUNTER — OFFICE VISIT - HEALTHEAST (OUTPATIENT)
Dept: FAMILY MEDICINE | Facility: CLINIC | Age: 49
End: 2019-04-30

## 2019-04-30 DIAGNOSIS — J45.40 MODERATE PERSISTENT ASTHMA WITHOUT COMPLICATION: ICD-10-CM

## 2019-04-30 DIAGNOSIS — Z95.5 S/P CORONARY ARTERY STENT PLACEMENT: ICD-10-CM

## 2019-04-30 DIAGNOSIS — R05.9 COUGH: ICD-10-CM

## 2019-04-30 DIAGNOSIS — E11.9 TYPE 2 DIABETES MELLITUS TREATED WITHOUT INSULIN (H): ICD-10-CM

## 2019-04-30 ASSESSMENT — MIFFLIN-ST. JEOR: SCORE: 1157.29

## 2019-05-13 ENCOUNTER — RECORDS - HEALTHEAST (OUTPATIENT)
Dept: ADMINISTRATIVE | Facility: OTHER | Age: 49
End: 2019-05-13

## 2019-05-15 ENCOUNTER — RECORDS - HEALTHEAST (OUTPATIENT)
Dept: HEALTH INFORMATION MANAGEMENT | Facility: CLINIC | Age: 49
End: 2019-05-15

## 2019-05-19 ENCOUNTER — COMMUNICATION - HEALTHEAST (OUTPATIENT)
Dept: FAMILY MEDICINE | Facility: CLINIC | Age: 49
End: 2019-05-19

## 2019-05-19 DIAGNOSIS — J45.909 ASTHMA: ICD-10-CM

## 2019-05-21 ENCOUNTER — COMMUNICATION - HEALTHEAST (OUTPATIENT)
Dept: FAMILY MEDICINE | Facility: CLINIC | Age: 49
End: 2019-05-21

## 2019-05-21 ENCOUNTER — OFFICE VISIT - HEALTHEAST (OUTPATIENT)
Dept: FAMILY MEDICINE | Facility: CLINIC | Age: 49
End: 2019-05-21

## 2019-05-21 DIAGNOSIS — E11.9 TYPE 2 DIABETES MELLITUS TREATED WITHOUT INSULIN (H): ICD-10-CM

## 2019-05-21 DIAGNOSIS — I10 ESSENTIAL HYPERTENSION: ICD-10-CM

## 2019-05-21 DIAGNOSIS — K21.9 GERD (GASTROESOPHAGEAL REFLUX DISEASE): ICD-10-CM

## 2019-05-21 DIAGNOSIS — J45.41 MODERATE PERSISTENT ASTHMA WITH EXACERBATION: ICD-10-CM

## 2019-05-21 ASSESSMENT — MIFFLIN-ST. JEOR: SCORE: 1154.73

## 2019-05-24 ENCOUNTER — COMMUNICATION - HEALTHEAST (OUTPATIENT)
Dept: NURSING | Facility: CLINIC | Age: 49
End: 2019-05-24

## 2019-05-24 ENCOUNTER — COMMUNICATION - HEALTHEAST (OUTPATIENT)
Dept: CARE COORDINATION | Facility: CLINIC | Age: 49
End: 2019-05-24

## 2019-05-28 ENCOUNTER — COMMUNICATION - HEALTHEAST (OUTPATIENT)
Dept: FAMILY MEDICINE | Facility: CLINIC | Age: 49
End: 2019-05-28

## 2019-05-28 ENCOUNTER — OFFICE VISIT - HEALTHEAST (OUTPATIENT)
Dept: FAMILY MEDICINE | Facility: CLINIC | Age: 49
End: 2019-05-28

## 2019-05-28 DIAGNOSIS — I10 ESSENTIAL HYPERTENSION: ICD-10-CM

## 2019-05-28 DIAGNOSIS — Z09 HOSPITAL DISCHARGE FOLLOW-UP: ICD-10-CM

## 2019-05-28 DIAGNOSIS — J45.40 MODERATE PERSISTENT ASTHMA WITHOUT COMPLICATION: ICD-10-CM

## 2019-05-28 DIAGNOSIS — R07.89 OTHER CHEST PAIN: ICD-10-CM

## 2019-05-28 DIAGNOSIS — Z95.5 S/P CORONARY ARTERY STENT PLACEMENT: ICD-10-CM

## 2019-05-28 DIAGNOSIS — K21.9 GERD (GASTROESOPHAGEAL REFLUX DISEASE): ICD-10-CM

## 2019-05-28 DIAGNOSIS — E11.9 TYPE 2 DIABETES MELLITUS TREATED WITHOUT INSULIN (H): ICD-10-CM

## 2019-05-28 ASSESSMENT — MIFFLIN-ST. JEOR: SCORE: 1121.62

## 2019-05-31 ENCOUNTER — DOCUMENTATION ONLY (OUTPATIENT)
Dept: OTHER | Facility: CLINIC | Age: 49
End: 2019-05-31

## 2019-05-31 ENCOUNTER — AMBULATORY - HEALTHEAST (OUTPATIENT)
Dept: OTHER | Facility: CLINIC | Age: 49
End: 2019-05-31

## 2019-06-03 ENCOUNTER — COMMUNICATION - HEALTHEAST (OUTPATIENT)
Dept: FAMILY MEDICINE | Facility: CLINIC | Age: 49
End: 2019-06-03

## 2019-06-04 ENCOUNTER — AMBULATORY - HEALTHEAST (OUTPATIENT)
Dept: FAMILY MEDICINE | Facility: CLINIC | Age: 49
End: 2019-06-04

## 2019-06-04 DIAGNOSIS — J45.909 UNCOMPLICATED ASTHMA, UNSPECIFIED ASTHMA SEVERITY, UNSPECIFIED WHETHER PERSISTENT: ICD-10-CM

## 2019-06-11 ENCOUNTER — OFFICE VISIT - HEALTHEAST (OUTPATIENT)
Dept: FAMILY MEDICINE | Facility: CLINIC | Age: 49
End: 2019-06-11

## 2019-06-11 DIAGNOSIS — E11.9 TYPE 2 DIABETES MELLITUS TREATED WITHOUT INSULIN (H): ICD-10-CM

## 2019-06-11 DIAGNOSIS — J02.9 SORE THROAT: ICD-10-CM

## 2019-06-11 DIAGNOSIS — I25.10 CORONARY ARTERY DISEASE INVOLVING NATIVE CORONARY ARTERY OF NATIVE HEART WITHOUT ANGINA PECTORIS: ICD-10-CM

## 2019-06-11 DIAGNOSIS — J45.41 MODERATE PERSISTENT ASTHMA WITH EXACERBATION: ICD-10-CM

## 2019-06-11 DIAGNOSIS — K64.9 HEMORRHOIDS, UNSPECIFIED HEMORRHOID TYPE: ICD-10-CM

## 2019-06-11 LAB — DEPRECATED S PYO AG THROAT QL EIA: NORMAL

## 2019-06-11 ASSESSMENT — MIFFLIN-ST. JEOR: SCORE: 1128.71

## 2019-06-12 LAB — GROUP A STREP BY PCR: NORMAL

## 2019-06-13 ENCOUNTER — COMMUNICATION - HEALTHEAST (OUTPATIENT)
Dept: NURSING | Facility: CLINIC | Age: 49
End: 2019-06-13

## 2019-06-14 ENCOUNTER — RECORDS - HEALTHEAST (OUTPATIENT)
Dept: ADMINISTRATIVE | Facility: OTHER | Age: 49
End: 2019-06-14

## 2019-06-20 ENCOUNTER — COMMUNICATION - HEALTHEAST (OUTPATIENT)
Dept: FAMILY MEDICINE | Facility: CLINIC | Age: 49
End: 2019-06-20

## 2019-06-24 ENCOUNTER — RECORDS - HEALTHEAST (OUTPATIENT)
Dept: ADMINISTRATIVE | Facility: OTHER | Age: 49
End: 2019-06-24

## 2019-06-25 ENCOUNTER — COMMUNICATION - HEALTHEAST (OUTPATIENT)
Dept: NURSING | Facility: CLINIC | Age: 49
End: 2019-06-25

## 2019-07-01 ENCOUNTER — AMBULATORY - HEALTHEAST (OUTPATIENT)
Dept: NURSING | Facility: CLINIC | Age: 49
End: 2019-07-01

## 2019-07-03 ENCOUNTER — OFFICE VISIT - HEALTHEAST (OUTPATIENT)
Dept: PULMONOLOGY | Facility: OTHER | Age: 49
End: 2019-07-03

## 2019-07-03 DIAGNOSIS — J45.41 MODERATE PERSISTENT ASTHMA WITH EXACERBATION: ICD-10-CM

## 2019-07-08 ENCOUNTER — COMMUNICATION - HEALTHEAST (OUTPATIENT)
Dept: FAMILY MEDICINE | Facility: CLINIC | Age: 49
End: 2019-07-08

## 2019-07-09 ENCOUNTER — OFFICE VISIT - HEALTHEAST (OUTPATIENT)
Dept: CARDIOLOGY | Facility: CLINIC | Age: 49
End: 2019-07-09

## 2019-07-09 DIAGNOSIS — R07.89 OTHER CHEST PAIN: ICD-10-CM

## 2019-07-09 DIAGNOSIS — I10 ESSENTIAL HYPERTENSION: ICD-10-CM

## 2019-07-09 DIAGNOSIS — I25.10 CORONARY ARTERY DISEASE INVOLVING NATIVE CORONARY ARTERY OF NATIVE HEART WITHOUT ANGINA PECTORIS: ICD-10-CM

## 2019-07-09 DIAGNOSIS — E78.5 DYSLIPIDEMIA: ICD-10-CM

## 2019-07-09 LAB
CREAT SERPL-MCNC: 0.65 MG/DL (ref 0.6–1.1)
GFR SERPL CREATININE-BSD FRML MDRD: >60 ML/MIN/1.73M2

## 2019-07-09 ASSESSMENT — MIFFLIN-ST. JEOR: SCORE: 1121.62

## 2019-07-11 ENCOUNTER — COMMUNICATION - HEALTHEAST (OUTPATIENT)
Dept: NURSING | Facility: CLINIC | Age: 49
End: 2019-07-11

## 2019-07-11 ENCOUNTER — OFFICE VISIT - HEALTHEAST (OUTPATIENT)
Dept: FAMILY MEDICINE | Facility: CLINIC | Age: 49
End: 2019-07-11

## 2019-07-11 ENCOUNTER — COMMUNICATION - HEALTHEAST (OUTPATIENT)
Dept: FAMILY MEDICINE | Facility: CLINIC | Age: 49
End: 2019-07-11

## 2019-07-11 ENCOUNTER — COMMUNICATION - HEALTHEAST (OUTPATIENT)
Dept: CARDIOLOGY | Facility: CLINIC | Age: 49
End: 2019-07-11

## 2019-07-11 DIAGNOSIS — I10 ESSENTIAL HYPERTENSION: ICD-10-CM

## 2019-07-11 DIAGNOSIS — I25.10 CORONARY ARTERY DISEASE INVOLVING NATIVE CORONARY ARTERY OF NATIVE HEART WITHOUT ANGINA PECTORIS: ICD-10-CM

## 2019-07-11 DIAGNOSIS — K21.9 GASTROESOPHAGEAL REFLUX DISEASE WITHOUT ESOPHAGITIS: ICD-10-CM

## 2019-07-11 DIAGNOSIS — E78.5 HYPERLIPIDEMIA, UNSPECIFIED HYPERLIPIDEMIA TYPE: ICD-10-CM

## 2019-07-11 DIAGNOSIS — J45.40 MODERATE PERSISTENT ASTHMA WITHOUT COMPLICATION: ICD-10-CM

## 2019-07-11 DIAGNOSIS — E11.9 TYPE 2 DIABETES MELLITUS TREATED WITHOUT INSULIN (H): ICD-10-CM

## 2019-07-11 LAB
ALBUMIN SERPL-MCNC: 4 G/DL (ref 3.5–5)
ALP SERPL-CCNC: 103 U/L (ref 45–120)
ALT SERPL W P-5'-P-CCNC: 19 U/L (ref 0–45)
ANION GAP SERPL CALCULATED.3IONS-SCNC: 15 MMOL/L (ref 5–18)
AST SERPL W P-5'-P-CCNC: 21 U/L (ref 0–40)
BILIRUB SERPL-MCNC: 0.8 MG/DL (ref 0–1)
BUN SERPL-MCNC: 10 MG/DL (ref 8–22)
CALCIUM SERPL-MCNC: 10.1 MG/DL (ref 8.5–10.5)
CHLORIDE BLD-SCNC: 105 MMOL/L (ref 98–107)
CO2 SERPL-SCNC: 20 MMOL/L (ref 22–31)
CREAT SERPL-MCNC: 0.74 MG/DL (ref 0.6–1.1)
GFR SERPL CREATININE-BSD FRML MDRD: >60 ML/MIN/1.73M2
GLUCOSE BLD-MCNC: 83 MG/DL (ref 70–125)
HBA1C MFR BLD: 6.5 % (ref 3.5–6)
POTASSIUM BLD-SCNC: 4.4 MMOL/L (ref 3.5–5)
PROT SERPL-MCNC: 7.3 G/DL (ref 6–8)
SODIUM SERPL-SCNC: 140 MMOL/L (ref 136–145)

## 2019-07-11 ASSESSMENT — MIFFLIN-ST. JEOR: SCORE: 1105.31

## 2019-07-16 ENCOUNTER — COMMUNICATION - HEALTHEAST (OUTPATIENT)
Dept: FAMILY MEDICINE | Facility: CLINIC | Age: 49
End: 2019-07-16

## 2019-07-16 ENCOUNTER — AMBULATORY - HEALTHEAST (OUTPATIENT)
Dept: FAMILY MEDICINE | Facility: CLINIC | Age: 49
End: 2019-07-16

## 2019-07-17 ENCOUNTER — COMMUNICATION - HEALTHEAST (OUTPATIENT)
Dept: FAMILY MEDICINE | Facility: CLINIC | Age: 49
End: 2019-07-17

## 2019-07-17 DIAGNOSIS — I25.119 CORONARY ARTERY DISEASE INVOLVING NATIVE CORONARY ARTERY OF NATIVE HEART WITH ANGINA PECTORIS (H): ICD-10-CM

## 2019-07-23 ENCOUNTER — COMMUNICATION - HEALTHEAST (OUTPATIENT)
Dept: NURSING | Facility: CLINIC | Age: 49
End: 2019-07-23

## 2019-07-24 ENCOUNTER — HOSPITAL ENCOUNTER (OUTPATIENT)
Dept: MRI IMAGING | Facility: HOSPITAL | Age: 49
Discharge: HOME | End: 2019-07-24
Attending: INTERNAL MEDICINE

## 2019-07-24 DIAGNOSIS — R07.89 OTHER CHEST PAIN: ICD-10-CM

## 2019-07-24 DIAGNOSIS — I25.10 CORONARY ARTERY DISEASE INVOLVING NATIVE CORONARY ARTERY OF NATIVE HEART WITHOUT ANGINA PECTORIS: ICD-10-CM

## 2019-07-24 LAB
ATRIAL RATE - MUSE: 86 BPM
ATRIAL RATE - MUSE: 87 BPM
DIASTOLIC BLOOD PRESSURE - MUSE: NORMAL MMHG
DIASTOLIC BLOOD PRESSURE - MUSE: NORMAL MMHG
INTERPRETATION ECG - MUSE: NORMAL
INTERPRETATION ECG - MUSE: NORMAL
P AXIS - MUSE: 2 DEGREES
P AXIS - MUSE: 34 DEGREES
PR INTERVAL - MUSE: 144 MS
PR INTERVAL - MUSE: 146 MS
QRS DURATION - MUSE: 88 MS
QRS DURATION - MUSE: 90 MS
QT - MUSE: 388 MS
QT - MUSE: 392 MS
QTC - MUSE: 464 MS
QTC - MUSE: 471 MS
R AXIS - MUSE: -12 DEGREES
R AXIS - MUSE: -25 DEGREES
SYSTOLIC BLOOD PRESSURE - MUSE: NORMAL MMHG
SYSTOLIC BLOOD PRESSURE - MUSE: NORMAL MMHG
T AXIS - MUSE: 35 DEGREES
T AXIS - MUSE: 36 DEGREES
VENTRICULAR RATE- MUSE: 86 BPM
VENTRICULAR RATE- MUSE: 87 BPM

## 2019-07-24 ASSESSMENT — MIFFLIN-ST. JEOR: SCORE: 1093.5

## 2019-07-26 LAB
CCTA EJECTION FRACTION: 74 %
CCTA INTERVENTRICULAR SETPUM: 1 CM (ref 0.6–1.1)
CCTA LEFT INTERNAL DIMENSION IN SYSTOLE: 1.8 CM (ref 2.1–4)
CCTA LEFT VENTRICULAR INTERNAL DIMENSION IN DIASTOLE: 3.2 CM (ref 3.5–6)
CCTA LEFT VENTRICULAR MASS: 77.34 G
CCTA POSTERIOR WALL: 0.8 CM (ref 0.6–1.1)
MR CARDIAC LEFT VENTRIAL CARDIAC INDEX: 1.9 L/MIN/M2 (ref 1.75–3.8)
MR CARDIAC LEFT VENTRICAL CARDIAC OUTPUT: 2.9 L/MIN (ref 2.8–8.8)
MR CARDIAC LEFT VENTRICULAR DIASTOLIC VOLUME INDEX: 35.98 ML/M2 (ref 41–81)
MR CARDIAC LEFT VENTRICULAR MASS INDEX: 51.03 G/M2 (ref 63–95)
MR CARDIAC LEFT VENTRICULAR MASS: 78 G (ref 75–175)
MR CARDIAC LEFT VENTRICULAR STROKE VOLUME INDEX: 24.86 ML/M2 (ref 26–56)
MR CARDIAC LEFT VENTRICULAR SYSTOLIC VOLUME INDEX: 11.12 ML/M2 (ref 12–20)
MR EJECTION FRACTION: 69.09 %
MR HEIGHT: 1.52 M
MR LEFT VENTRICULAR DYSTOLIC VOLUMEN: 55 ML (ref 52–141)
MR LEFT VENTRICULAR STROKE VOLUMEN: 38 ML (ref 33–97)
MR LEFT VENTRICULAR SYSTOLIC VOLUME: 17 ML (ref 13–51)
MR WEIGHT: 56.7 KG

## 2019-07-29 ENCOUNTER — AMBULATORY - HEALTHEAST (OUTPATIENT)
Dept: FAMILY MEDICINE | Facility: CLINIC | Age: 49
End: 2019-07-29

## 2019-07-29 ENCOUNTER — COMMUNICATION - HEALTHEAST (OUTPATIENT)
Dept: FAMILY MEDICINE | Facility: CLINIC | Age: 49
End: 2019-07-29

## 2019-07-29 DIAGNOSIS — E11.9 DM TYPE 2 (DIABETES MELLITUS, TYPE 2) (H): ICD-10-CM

## 2019-08-02 ENCOUNTER — AMBULATORY - HEALTHEAST (OUTPATIENT)
Dept: NURSING | Facility: CLINIC | Age: 49
End: 2019-08-02

## 2019-08-02 ENCOUNTER — COMMUNICATION - HEALTHEAST (OUTPATIENT)
Dept: FAMILY MEDICINE | Facility: CLINIC | Age: 49
End: 2019-08-02

## 2019-08-02 DIAGNOSIS — Z95.5 S/P CORONARY ARTERY STENT PLACEMENT: ICD-10-CM

## 2019-08-08 ENCOUNTER — OFFICE VISIT - HEALTHEAST (OUTPATIENT)
Dept: FAMILY MEDICINE | Facility: CLINIC | Age: 49
End: 2019-08-08

## 2019-08-08 DIAGNOSIS — I47.10 SVT (SUPRAVENTRICULAR TACHYCARDIA) (H): ICD-10-CM

## 2019-08-08 DIAGNOSIS — J45.909 ASTHMA WITHOUT STATUS ASTHMATICUS: ICD-10-CM

## 2019-08-08 DIAGNOSIS — E11.9 TYPE 2 DIABETES MELLITUS TREATED WITHOUT INSULIN (H): ICD-10-CM

## 2019-08-08 DIAGNOSIS — Z95.5 S/P CORONARY ARTERY STENT PLACEMENT: ICD-10-CM

## 2019-08-08 DIAGNOSIS — R52 PAIN: ICD-10-CM

## 2019-08-08 DIAGNOSIS — D64.9 ANEMIA, UNSPECIFIED TYPE: ICD-10-CM

## 2019-08-08 ASSESSMENT — MIFFLIN-ST. JEOR: SCORE: 1095.2

## 2019-08-09 ENCOUNTER — COMMUNICATION - HEALTHEAST (OUTPATIENT)
Dept: CARDIOLOGY | Facility: CLINIC | Age: 49
End: 2019-08-09

## 2019-08-09 DIAGNOSIS — I48.0 PAROXYSMAL ATRIAL FIBRILLATION (H): ICD-10-CM

## 2019-08-13 ENCOUNTER — HOSPITAL ENCOUNTER (OUTPATIENT)
Dept: CARDIOLOGY | Facility: HOSPITAL | Age: 49
Discharge: HOME | End: 2019-08-13
Attending: INTERNAL MEDICINE

## 2019-08-13 ENCOUNTER — COMMUNICATION - HEALTHEAST (OUTPATIENT)
Dept: FAMILY MEDICINE | Facility: CLINIC | Age: 49
End: 2019-08-13

## 2019-08-13 DIAGNOSIS — I48.0 PAROXYSMAL ATRIAL FIBRILLATION (H): ICD-10-CM

## 2019-08-13 DIAGNOSIS — K64.9 HEMORRHOIDS, UNSPECIFIED HEMORRHOID TYPE: ICD-10-CM

## 2019-08-20 ENCOUNTER — RECORDS - HEALTHEAST (OUTPATIENT)
Dept: ADMINISTRATIVE | Facility: OTHER | Age: 49
End: 2019-08-20

## 2019-09-03 ENCOUNTER — RECORDS - HEALTHEAST (OUTPATIENT)
Dept: ADMINISTRATIVE | Facility: OTHER | Age: 49
End: 2019-09-03

## 2019-09-05 ENCOUNTER — RECORDS - HEALTHEAST (OUTPATIENT)
Dept: ADMINISTRATIVE | Facility: OTHER | Age: 49
End: 2019-09-05

## 2019-09-05 ENCOUNTER — OFFICE VISIT - HEALTHEAST (OUTPATIENT)
Dept: FAMILY MEDICINE | Facility: CLINIC | Age: 49
End: 2019-09-05

## 2019-09-05 ENCOUNTER — COMMUNICATION - HEALTHEAST (OUTPATIENT)
Dept: FAMILY MEDICINE | Facility: CLINIC | Age: 49
End: 2019-09-05

## 2019-09-05 DIAGNOSIS — E11.9 TYPE 2 DIABETES MELLITUS TREATED WITHOUT INSULIN (H): ICD-10-CM

## 2019-09-05 DIAGNOSIS — J45.41 MODERATE PERSISTENT ASTHMA WITH ACUTE EXACERBATION: ICD-10-CM

## 2019-09-05 ASSESSMENT — MIFFLIN-ST. JEOR: SCORE: 1100.02

## 2019-09-11 ENCOUNTER — COMMUNICATION - HEALTHEAST (OUTPATIENT)
Dept: NURSING | Facility: CLINIC | Age: 49
End: 2019-09-11

## 2019-09-23 ENCOUNTER — COMMUNICATION - HEALTHEAST (OUTPATIENT)
Dept: CARE COORDINATION | Facility: CLINIC | Age: 49
End: 2019-09-23

## 2019-10-01 ENCOUNTER — COMMUNICATION - HEALTHEAST (OUTPATIENT)
Dept: NURSING | Facility: CLINIC | Age: 49
End: 2019-10-01

## 2019-10-01 ENCOUNTER — OFFICE VISIT - HEALTHEAST (OUTPATIENT)
Dept: FAMILY MEDICINE | Facility: CLINIC | Age: 49
End: 2019-10-01

## 2019-10-01 DIAGNOSIS — R10.30 LOWER ABDOMINAL PAIN: ICD-10-CM

## 2019-10-01 DIAGNOSIS — E11.9 TYPE 2 DIABETES MELLITUS TREATED WITHOUT INSULIN (H): ICD-10-CM

## 2019-10-01 DIAGNOSIS — K92.1 BLOOD IN STOOL: ICD-10-CM

## 2019-10-01 DIAGNOSIS — J45.40 MODERATE PERSISTENT ASTHMA, UNSPECIFIED WHETHER COMPLICATED: ICD-10-CM

## 2019-10-01 LAB — HBA1C MFR BLD: 6.1 % (ref 3.5–6)

## 2019-10-01 ASSESSMENT — MIFFLIN-ST. JEOR: SCORE: 1094.06

## 2019-10-02 ENCOUNTER — OFFICE VISIT - HEALTHEAST (OUTPATIENT)
Dept: PULMONOLOGY | Facility: OTHER | Age: 49
End: 2019-10-02

## 2019-10-02 ENCOUNTER — COMMUNICATION - HEALTHEAST (OUTPATIENT)
Dept: PULMONOLOGY | Facility: OTHER | Age: 49
End: 2019-10-02

## 2019-10-02 DIAGNOSIS — J45.40 MODERATE PERSISTENT ASTHMA, UNSPECIFIED WHETHER COMPLICATED: ICD-10-CM

## 2019-10-02 DIAGNOSIS — J45.41 MODERATE PERSISTENT ASTHMA WITH EXACERBATION: ICD-10-CM

## 2019-10-02 LAB — BACTERIA SPEC CULT: NO GROWTH

## 2019-10-03 ENCOUNTER — OFFICE VISIT - HEALTHEAST (OUTPATIENT)
Dept: CARDIOLOGY | Facility: CLINIC | Age: 49
End: 2019-10-03

## 2019-10-03 DIAGNOSIS — E78.5 DYSLIPIDEMIA: ICD-10-CM

## 2019-10-03 DIAGNOSIS — I10 ESSENTIAL HYPERTENSION: ICD-10-CM

## 2019-10-03 DIAGNOSIS — I25.10 CORONARY ARTERY DISEASE INVOLVING NATIVE CORONARY ARTERY OF NATIVE HEART WITHOUT ANGINA PECTORIS: ICD-10-CM

## 2019-10-03 ASSESSMENT — MIFFLIN-ST. JEOR: SCORE: 1084.42

## 2019-10-15 ENCOUNTER — OFFICE VISIT - HEALTHEAST (OUTPATIENT)
Dept: FAMILY MEDICINE | Facility: CLINIC | Age: 49
End: 2019-10-15

## 2019-10-15 DIAGNOSIS — J45.41 MODERATE PERSISTENT ASTHMA WITH ACUTE EXACERBATION: ICD-10-CM

## 2019-10-15 DIAGNOSIS — R10.30 LOWER ABDOMINAL PAIN: ICD-10-CM

## 2019-10-15 DIAGNOSIS — E11.9 TYPE 2 DIABETES MELLITUS TREATED WITHOUT INSULIN (H): ICD-10-CM

## 2019-10-15 DIAGNOSIS — Z12.31 VISIT FOR SCREENING MAMMOGRAM: ICD-10-CM

## 2019-10-15 ASSESSMENT — MIFFLIN-ST. JEOR: SCORE: 1113.34

## 2019-10-31 ENCOUNTER — COMMUNICATION - HEALTHEAST (OUTPATIENT)
Dept: NURSING | Facility: CLINIC | Age: 49
End: 2019-10-31

## 2019-11-01 ENCOUNTER — RECORDS - HEALTHEAST (OUTPATIENT)
Dept: ADMINISTRATIVE | Facility: OTHER | Age: 49
End: 2019-11-01

## 2019-11-06 ENCOUNTER — COMMUNICATION - HEALTHEAST (OUTPATIENT)
Dept: NURSING | Facility: CLINIC | Age: 49
End: 2019-11-06

## 2019-11-19 ENCOUNTER — COMMUNICATION - HEALTHEAST (OUTPATIENT)
Dept: CARE COORDINATION | Facility: CLINIC | Age: 49
End: 2019-11-19

## 2019-11-20 ENCOUNTER — COMMUNICATION - HEALTHEAST (OUTPATIENT)
Dept: CARE COORDINATION | Facility: CLINIC | Age: 49
End: 2019-11-20

## 2019-11-20 ENCOUNTER — COMMUNICATION - HEALTHEAST (OUTPATIENT)
Dept: SCHEDULING | Facility: CLINIC | Age: 49
End: 2019-11-20

## 2019-11-21 ENCOUNTER — OFFICE VISIT - HEALTHEAST (OUTPATIENT)
Dept: PULMONOLOGY | Facility: OTHER | Age: 49
End: 2019-11-21

## 2019-11-21 DIAGNOSIS — J45.40 MODERATE PERSISTENT ASTHMA WITHOUT COMPLICATION: ICD-10-CM

## 2019-11-21 LAB
BASOPHILS # BLD AUTO: 0 THOU/UL (ref 0–0.2)
BASOPHILS NFR BLD AUTO: 0 % (ref 0–2)
EOSINOPHIL # BLD AUTO: 0.3 THOU/UL (ref 0–0.4)
EOSINOPHIL NFR BLD AUTO: 3 % (ref 0–6)
ERYTHROCYTE [DISTWIDTH] IN BLOOD BY AUTOMATED COUNT: 17.2 % (ref 11–14.5)
HCT VFR BLD AUTO: 34.6 % (ref 35–47)
HGB BLD-MCNC: 10.1 G/DL (ref 12–16)
LYMPHOCYTES # BLD AUTO: 3.6 THOU/UL (ref 0.8–4.4)
LYMPHOCYTES NFR BLD AUTO: 36 % (ref 20–40)
MCH RBC QN AUTO: 22.6 PG (ref 27–34)
MCHC RBC AUTO-ENTMCNC: 29.2 G/DL (ref 32–36)
MCV RBC AUTO: 77 FL (ref 80–100)
MONOCYTES # BLD AUTO: 0.7 THOU/UL (ref 0–0.9)
MONOCYTES NFR BLD AUTO: 7 % (ref 2–10)
NEUTROPHILS # BLD AUTO: 5.4 THOU/UL (ref 2–7.7)
NEUTROPHILS NFR BLD AUTO: 54 % (ref 50–70)
PLATELET # BLD AUTO: 265 THOU/UL (ref 140–440)
PMV BLD AUTO: 12.8 FL (ref 8.5–12.5)
RBC # BLD AUTO: 4.47 MILL/UL (ref 3.8–5.4)
WBC: 10.1 THOU/UL (ref 4–11)

## 2019-11-22 LAB
A ALTERNATA IGE QN: <0.35 KU/L
ALLERGEN HOUSE DUST GREER: <0.35 KU/L
C HERBARUM IGE QN: <0.35 KU/L
CAT DANDER IGG QN: <0.35 KU/L
COMMON RAGWEED IGE QN: <0.35 KU/L
D FARINAE IGE QN: <0.35 KU/L
DOG DANDER+EPITH IGE QN: <0.35 KU/L

## 2019-11-26 ENCOUNTER — COMMUNICATION - HEALTHEAST (OUTPATIENT)
Dept: FAMILY MEDICINE | Facility: CLINIC | Age: 49
End: 2019-11-26

## 2019-11-26 ENCOUNTER — OFFICE VISIT - HEALTHEAST (OUTPATIENT)
Dept: FAMILY MEDICINE | Facility: CLINIC | Age: 49
End: 2019-11-26

## 2019-11-26 DIAGNOSIS — I25.10 CORONARY ARTERY DISEASE INVOLVING NATIVE CORONARY ARTERY OF NATIVE HEART WITHOUT ANGINA PECTORIS: ICD-10-CM

## 2019-11-26 DIAGNOSIS — R07.9 CHEST PAIN, UNSPECIFIED TYPE: ICD-10-CM

## 2019-11-26 DIAGNOSIS — E11.69 TYPE 2 DIABETES MELLITUS WITH OTHER SPECIFIED COMPLICATION, WITHOUT LONG-TERM CURRENT USE OF INSULIN (H): ICD-10-CM

## 2019-11-26 DIAGNOSIS — R06.02 SOB (SHORTNESS OF BREATH): ICD-10-CM

## 2019-11-26 DIAGNOSIS — Z09 HOSPITAL DISCHARGE FOLLOW-UP: ICD-10-CM

## 2019-11-26 ASSESSMENT — MIFFLIN-ST. JEOR: SCORE: 1162.38

## 2019-12-07 ENCOUNTER — COMMUNICATION - HEALTHEAST (OUTPATIENT)
Dept: FAMILY MEDICINE | Facility: CLINIC | Age: 49
End: 2019-12-07

## 2019-12-07 DIAGNOSIS — E11.9 TYPE 2 DIABETES MELLITUS TREATED WITHOUT INSULIN (H): ICD-10-CM

## 2019-12-10 ENCOUNTER — COMMUNICATION - HEALTHEAST (OUTPATIENT)
Dept: FAMILY MEDICINE | Facility: CLINIC | Age: 49
End: 2019-12-10

## 2019-12-18 ENCOUNTER — COMMUNICATION - HEALTHEAST (OUTPATIENT)
Dept: FAMILY MEDICINE | Facility: CLINIC | Age: 49
End: 2019-12-18

## 2019-12-18 DIAGNOSIS — I10 ESSENTIAL HYPERTENSION: ICD-10-CM

## 2019-12-24 ENCOUNTER — COMMUNICATION - HEALTHEAST (OUTPATIENT)
Dept: FAMILY MEDICINE | Facility: CLINIC | Age: 49
End: 2019-12-24

## 2019-12-24 ENCOUNTER — COMMUNICATION - HEALTHEAST (OUTPATIENT)
Dept: CARE COORDINATION | Facility: CLINIC | Age: 49
End: 2019-12-24

## 2019-12-24 DIAGNOSIS — I25.119 CORONARY ARTERY DISEASE INVOLVING NATIVE CORONARY ARTERY OF NATIVE HEART WITH ANGINA PECTORIS (H): ICD-10-CM

## 2019-12-31 ENCOUNTER — OFFICE VISIT - HEALTHEAST (OUTPATIENT)
Dept: FAMILY MEDICINE | Facility: CLINIC | Age: 49
End: 2019-12-31

## 2019-12-31 DIAGNOSIS — Z95.5 S/P CORONARY ARTERY STENT PLACEMENT: ICD-10-CM

## 2019-12-31 DIAGNOSIS — M79.18 PAIN IN RIGHT BUTTOCK: ICD-10-CM

## 2019-12-31 DIAGNOSIS — J45.40 MODERATE PERSISTENT ASTHMA, UNSPECIFIED WHETHER COMPLICATED: ICD-10-CM

## 2019-12-31 DIAGNOSIS — E11.9 TYPE 2 DIABETES MELLITUS TREATED WITHOUT INSULIN (H): ICD-10-CM

## 2019-12-31 DIAGNOSIS — R07.89 OTHER CHEST PAIN: ICD-10-CM

## 2019-12-31 ASSESSMENT — MIFFLIN-ST. JEOR: SCORE: 1170.89

## 2020-01-06 ENCOUNTER — COMMUNICATION - HEALTHEAST (OUTPATIENT)
Dept: SCHEDULING | Facility: CLINIC | Age: 50
End: 2020-01-06

## 2020-01-06 ENCOUNTER — COMMUNICATION - HEALTHEAST (OUTPATIENT)
Dept: NURSING | Facility: CLINIC | Age: 50
End: 2020-01-06

## 2020-01-09 ENCOUNTER — COMMUNICATION - HEALTHEAST (OUTPATIENT)
Dept: FAMILY MEDICINE | Facility: CLINIC | Age: 50
End: 2020-01-09

## 2020-01-09 ENCOUNTER — HOME CARE/HOSPICE - HEALTHEAST (OUTPATIENT)
Dept: HOME HEALTH SERVICES | Facility: HOME HEALTH | Age: 50
End: 2020-01-09

## 2020-01-10 ENCOUNTER — COMMUNICATION - HEALTHEAST (OUTPATIENT)
Dept: CARE COORDINATION | Facility: CLINIC | Age: 50
End: 2020-01-10

## 2020-01-11 ENCOUNTER — HOME CARE/HOSPICE - HEALTHEAST (OUTPATIENT)
Dept: HOME HEALTH SERVICES | Facility: HOME HEALTH | Age: 50
End: 2020-01-11

## 2020-01-12 ENCOUNTER — COMMUNICATION - HEALTHEAST (OUTPATIENT)
Dept: HOME HEALTH SERVICES | Facility: HOME HEALTH | Age: 50
End: 2020-01-12

## 2020-01-12 ENCOUNTER — COMMUNICATION - HEALTHEAST (OUTPATIENT)
Dept: PULMONOLOGY | Facility: OTHER | Age: 50
End: 2020-01-12

## 2020-01-12 ENCOUNTER — HOME CARE/HOSPICE - HEALTHEAST (OUTPATIENT)
Dept: HOME HEALTH SERVICES | Facility: HOME HEALTH | Age: 50
End: 2020-01-12

## 2020-01-12 DIAGNOSIS — J45.41 MODERATE PERSISTENT ASTHMA WITH ACUTE EXACERBATION: ICD-10-CM

## 2020-01-13 ENCOUNTER — COMMUNICATION - HEALTHEAST (OUTPATIENT)
Dept: HOME HEALTH SERVICES | Facility: HOME HEALTH | Age: 50
End: 2020-01-13

## 2020-01-13 ENCOUNTER — COMMUNICATION - HEALTHEAST (OUTPATIENT)
Dept: SCHEDULING | Facility: CLINIC | Age: 50
End: 2020-01-13

## 2020-01-13 ENCOUNTER — OFFICE VISIT - HEALTHEAST (OUTPATIENT)
Dept: FAMILY MEDICINE | Facility: CLINIC | Age: 50
End: 2020-01-13

## 2020-01-13 DIAGNOSIS — Z12.31 VISIT FOR SCREENING MAMMOGRAM: ICD-10-CM

## 2020-01-13 DIAGNOSIS — R12 HEARTBURN: ICD-10-CM

## 2020-01-13 ASSESSMENT — MIFFLIN-ST. JEOR: SCORE: 1088.29

## 2020-01-14 ENCOUNTER — COMMUNICATION - HEALTHEAST (OUTPATIENT)
Dept: ADMINISTRATIVE | Facility: CLINIC | Age: 50
End: 2020-01-14

## 2020-01-16 ENCOUNTER — OFFICE VISIT - HEALTHEAST (OUTPATIENT)
Dept: FAMILY MEDICINE | Facility: CLINIC | Age: 50
End: 2020-01-16

## 2020-01-16 ENCOUNTER — COMMUNICATION - HEALTHEAST (OUTPATIENT)
Dept: FAMILY MEDICINE | Facility: CLINIC | Age: 50
End: 2020-01-16

## 2020-01-16 DIAGNOSIS — R07.9 CHEST PAIN, UNSPECIFIED TYPE: ICD-10-CM

## 2020-01-16 DIAGNOSIS — I10 ESSENTIAL HYPERTENSION: ICD-10-CM

## 2020-01-16 DIAGNOSIS — E11.9 TYPE 2 DIABETES MELLITUS TREATED WITHOUT INSULIN (H): ICD-10-CM

## 2020-01-16 DIAGNOSIS — J45.40 MODERATE PERSISTENT ASTHMA, UNSPECIFIED WHETHER COMPLICATED: ICD-10-CM

## 2020-01-16 DIAGNOSIS — Z09 HOSPITAL DISCHARGE FOLLOW-UP: ICD-10-CM

## 2020-01-16 LAB
CREAT UR-MCNC: 28.4 MG/DL
MICROALBUMIN UR-MCNC: <0.5 MG/DL (ref 0–1.99)
MICROALBUMIN/CREAT UR: NORMAL MG/G{CREAT}

## 2020-01-16 ASSESSMENT — MIFFLIN-ST. JEOR: SCORE: 1105.3

## 2020-01-17 ENCOUNTER — COMMUNICATION - HEALTHEAST (OUTPATIENT)
Dept: NURSING | Facility: CLINIC | Age: 50
End: 2020-01-17

## 2020-01-17 ENCOUNTER — COMMUNICATION - HEALTHEAST (OUTPATIENT)
Dept: CARE COORDINATION | Facility: CLINIC | Age: 50
End: 2020-01-17

## 2020-01-21 ENCOUNTER — HOME CARE/HOSPICE - HEALTHEAST (OUTPATIENT)
Dept: HOME HEALTH SERVICES | Facility: HOME HEALTH | Age: 50
End: 2020-01-21

## 2020-01-24 ENCOUNTER — OFFICE VISIT - HEALTHEAST (OUTPATIENT)
Dept: PULMONOLOGY | Facility: OTHER | Age: 50
End: 2020-01-24

## 2020-01-24 DIAGNOSIS — K21.9 GERD (GASTROESOPHAGEAL REFLUX DISEASE): ICD-10-CM

## 2020-01-24 ASSESSMENT — MIFFLIN-ST. JEOR: SCORE: 1108.91

## 2020-01-28 ENCOUNTER — COMMUNICATION - HEALTHEAST (OUTPATIENT)
Dept: ADMINISTRATIVE | Facility: CLINIC | Age: 50
End: 2020-01-28

## 2020-01-31 ENCOUNTER — COMMUNICATION - HEALTHEAST (OUTPATIENT)
Dept: PULMONOLOGY | Facility: OTHER | Age: 50
End: 2020-01-31

## 2020-01-31 DIAGNOSIS — J45.901 ASTHMA EXACERBATION: ICD-10-CM

## 2020-02-04 ENCOUNTER — COMMUNICATION - HEALTHEAST (OUTPATIENT)
Dept: FAMILY MEDICINE | Facility: CLINIC | Age: 50
End: 2020-02-04

## 2020-02-04 DIAGNOSIS — E11.9 TYPE 2 DIABETES MELLITUS TREATED WITHOUT INSULIN (H): ICD-10-CM

## 2020-02-05 ENCOUNTER — COMMUNICATION - HEALTHEAST (OUTPATIENT)
Dept: PULMONOLOGY | Facility: OTHER | Age: 50
End: 2020-02-05

## 2020-02-06 ENCOUNTER — OFFICE VISIT - HEALTHEAST (OUTPATIENT)
Dept: PULMONOLOGY | Facility: OTHER | Age: 50
End: 2020-02-06

## 2020-02-06 DIAGNOSIS — J45.41 MODERATE PERSISTENT ASTHMA WITH EXACERBATION: ICD-10-CM

## 2020-02-26 ENCOUNTER — COMMUNICATION - HEALTHEAST (OUTPATIENT)
Dept: PULMONOLOGY | Facility: OTHER | Age: 50
End: 2020-02-26

## 2020-02-26 DIAGNOSIS — J45.901 ASTHMA EXACERBATION: ICD-10-CM

## 2020-03-02 ENCOUNTER — COMMUNICATION - HEALTHEAST (OUTPATIENT)
Dept: FAMILY MEDICINE | Facility: CLINIC | Age: 50
End: 2020-03-02

## 2020-03-02 DIAGNOSIS — E11.9 TYPE 2 DIABETES MELLITUS TREATED WITHOUT INSULIN (H): ICD-10-CM

## 2020-03-04 ENCOUNTER — COMMUNICATION - HEALTHEAST (OUTPATIENT)
Dept: PULMONOLOGY | Facility: OTHER | Age: 50
End: 2020-03-04

## 2020-03-04 DIAGNOSIS — J45.41 MODERATE PERSISTENT ASTHMA WITH EXACERBATION: ICD-10-CM

## 2020-03-06 ENCOUNTER — OFFICE VISIT - HEALTHEAST (OUTPATIENT)
Dept: PULMONOLOGY | Facility: OTHER | Age: 50
End: 2020-03-06

## 2020-03-06 DIAGNOSIS — J45.40 MODERATE PERSISTENT ASTHMA, UNSPECIFIED WHETHER COMPLICATED: ICD-10-CM

## 2020-03-06 DIAGNOSIS — R11.0 NAUSEA: ICD-10-CM

## 2020-03-06 ASSESSMENT — MIFFLIN-ST. JEOR: SCORE: 1154.27

## 2020-03-11 ENCOUNTER — COMMUNICATION - HEALTHEAST (OUTPATIENT)
Dept: SCHEDULING | Facility: CLINIC | Age: 50
End: 2020-03-11

## 2020-03-11 ENCOUNTER — COMMUNICATION - HEALTHEAST (OUTPATIENT)
Dept: FAMILY MEDICINE | Facility: CLINIC | Age: 50
End: 2020-03-11

## 2020-03-11 DIAGNOSIS — D50.9 MICROCYTIC ANEMIA: ICD-10-CM

## 2020-03-24 ENCOUNTER — RECORDS - HEALTHEAST (OUTPATIENT)
Dept: ADMINISTRATIVE | Facility: OTHER | Age: 50
End: 2020-03-24

## 2020-03-25 ENCOUNTER — COMMUNICATION - HEALTHEAST (OUTPATIENT)
Dept: PULMONOLOGY | Facility: OTHER | Age: 50
End: 2020-03-25

## 2020-03-26 ENCOUNTER — VIRTUAL VISIT (OUTPATIENT)
Dept: FAMILY MEDICINE | Facility: OTHER | Age: 50
End: 2020-03-26

## 2020-03-26 NOTE — PROGRESS NOTES
"Date: 2020 16:43:39  Clinician: Kyle Patton  Clinician NPI: [id number]  Patient: Kulwant Amin  Patient : 1970  Patient Address: [address] [place] [address]  Patient Phone: [phone]  Visit Protocol: URI  Patient Summary:  Kulwant is a 50 year old ( : 1970 ) female who initiated a Visit for cold, sinus infection, or influenza. When asked the question \"Please sign me up to receive news, health information and promotions. \", Kulwant responded \"No\".    Kulwant states her symptoms started gradually 3-6 days ago.   Her symptoms consist of a cough, malaise, a headache, and myalgia. Kulwant also feels feverish.   Symptom details     Cough: Kulwant coughs almost every minute and her cough is more bothersome at night. Phlegm does not come into her throat when she coughs. She does not believe her cough is caused by post-nasal drip.     Temperature: Her current temperature is 94.9 degrees Fahrenheit.     Headache: She states the headache is mild (1-3 on a 10 point pain scale).      Kulwant denies having ear pain, rhinitis, wheezing, sore throat, nasal congestion, enlarged lymph nodes, teeth pain, facial pain or pressure, and chills. She also denies taking antibiotic medication for the symptoms, having recent facial or sinus surgery in the past 60 days, double sickening (worsening symptoms after initial improvement), and having a sinus infection within the past year. She is not experiencing dyspnea.   Precipitating events  She has not recently been exposed to someone with influenza. Kulwant has not been in close contact with any high risk individuals.   Pertinent COVID-19 (Coronavirus) information  Kulwant has not traveled internationally or to the areas where COVID-19 (Coronavirus) is widespread, including cruise ship travel in the last 14 days before the start of her symptoms.   Kulwant has not had a close contact with a laboratory-confirmed COVID-19 patient within 14 days of symptom onset. She also has not had " a close contact with a suspected COVID-19 patient within 14 days of symptom onset.   Kulwant is not a healthcare worker or a  and does not work in a healthcare facility. She does not live with a healthcare worker.   Pertinent medical history  Kulwant does not get yeast infections when she takes antibiotics.   Kulwant does not need a return to work/school note.   Weight: 130 lbs   Kulwant does not smoke or use smokeless tobacco.   Additional information as reported by the patient (free text): She is using BREO ellipta for asthma   Weight: 130 lbs  A synchronous phone visit was initiated by the provider for the following reason: call to ask about symptoms given asthma history and reports coughing every minute    MEDICATIONS: metoprolol tartrate-hydrochlorothiazide oral, ferrous sulfate oral, metformin oral, montelukast oral, omeprazole oral, Aspir-Low oral, prednisolone oral, clopidogrel oral, atorvastatin oral, hydrochlorothiazide oral, amitriptyline oral, sucralfate oral, ALLERGIES: NKDA  Clinician Response:  Dear Kulwant,   As we discussed on the phone. &nbsp;Please start the Prednisone and continue with inhalers. &nbsp;Schedule taking Albuterol 2 puffs every 4-6 hours.&nbsp;  If she is not improving over the next 24-48 hours please call the number below to schedule in Urgent Care.&nbsp;  If she is getting progressively worse or major concerns please go to the ER in the meantime.&nbsp;  Would recommend calling pulmonologist to notify them of the recommendations.&nbsp;  Based on the information you have provided, further evaluation in urgent care is indicated. You may be seen in one of our designated urgent care sites that is prepared to see patients who have symptoms that could indicate Coronavirus (Covid-19).   For your information: At this time we will NOT perform coronavirus testing in urgent care sites. This test is currently being reserved for patients who are being admitted to the hospital.  Our locations:  [place], [place], [place], [place], [place], [place], [place], [place]), [place] and [place]  Please call [address] [place], [place] [address]jorge a ([phone]) to schedule an urgent care appointment at one of our urgent care or walk in care sites. It is essential that you tell them to let the  know that they were referred to be seen in urgent care from [facility]  PLEASE BRING DOCUMENTATION FROM THIS COMPLETED [facility] VISIT TO YOUR URGENT CARE VISIT.  For more information about COVID19 and options for caring for yourself at home, please visit the CDC website at https://www.cdc.gov/coronavirus/2019-ncov/about/steps-when-sick.html  For more options for care at [facility], please visit our website at [email or web address]    Diagnosis: Cough  Diagnosis ICD: R05  Triage Notes: I reviewed the patient's history, verified their identity, and explained the Visit process.    Spoke with her son.  Kulwant does not speak English. They attempted to call her Pulmonologist today and they told her she needed to go through [facility].  Son notes that she has been coughing more which is normal when she has an asthma exacerbation.  She is feeling feverish but temps have been < 99.  She otherwise is doing ok, breathing comfortably, isn't appearing excessively sleepy.     Reviewed her chart in Epic which it appears she receives prednisone for exacerbations.  They state she has none left at home, will send in Prednisone for her.  Advised to continue inhalers.  If getting worse recommend ER.  If not improving recommend UC.  Synchronous Triage: phone, status: completed, duration: 653 seconds  Prescription: prednisone 20 mg oral tablet 10 tablet, 5 days supply. Take 2 tablets by mouth 1 time per day for 5 days. Refills: 0, Refill as needed: no, Allow substitutions: yes  Pharmacy: [facility] - [phone] - [address], [place] [address]

## 2020-04-01 ENCOUNTER — COMMUNICATION - HEALTHEAST (OUTPATIENT)
Dept: PULMONOLOGY | Facility: OTHER | Age: 50
End: 2020-04-01

## 2020-04-01 DIAGNOSIS — J45.41 MODERATE PERSISTENT ASTHMA WITH EXACERBATION: ICD-10-CM

## 2020-04-06 ENCOUNTER — RECORDS - HEALTHEAST (OUTPATIENT)
Dept: ADMINISTRATIVE | Facility: OTHER | Age: 50
End: 2020-04-06

## 2020-04-07 ENCOUNTER — OFFICE VISIT - HEALTHEAST (OUTPATIENT)
Dept: FAMILY MEDICINE | Facility: CLINIC | Age: 50
End: 2020-04-07

## 2020-04-07 ENCOUNTER — AMBULATORY - HEALTHEAST (OUTPATIENT)
Dept: FAMILY MEDICINE | Facility: CLINIC | Age: 50
End: 2020-04-07

## 2020-04-07 ENCOUNTER — COMMUNICATION - HEALTHEAST (OUTPATIENT)
Dept: FAMILY MEDICINE | Facility: CLINIC | Age: 50
End: 2020-04-07

## 2020-04-07 DIAGNOSIS — E11.9 TYPE 2 DIABETES MELLITUS TREATED WITHOUT INSULIN (H): ICD-10-CM

## 2020-04-07 DIAGNOSIS — R53.1 WEAKNESS GENERALIZED: ICD-10-CM

## 2020-04-07 DIAGNOSIS — J45.901 ASTHMA WITH ACUTE EXACERBATION, UNSPECIFIED ASTHMA SEVERITY, UNSPECIFIED WHETHER PERSISTENT: ICD-10-CM

## 2020-04-07 DIAGNOSIS — R05.9 COUGH: ICD-10-CM

## 2020-04-23 ENCOUNTER — OFFICE VISIT - HEALTHEAST (OUTPATIENT)
Dept: FAMILY MEDICINE | Facility: CLINIC | Age: 50
End: 2020-04-23

## 2020-04-23 ENCOUNTER — COMMUNICATION - HEALTHEAST (OUTPATIENT)
Dept: SCHEDULING | Facility: CLINIC | Age: 50
End: 2020-04-23

## 2020-04-23 DIAGNOSIS — G43.809 OTHER MIGRAINE WITHOUT STATUS MIGRAINOSUS, NOT INTRACTABLE: ICD-10-CM

## 2020-04-23 DIAGNOSIS — I10 ESSENTIAL HYPERTENSION: ICD-10-CM

## 2020-04-24 ENCOUNTER — OFFICE VISIT - HEALTHEAST (OUTPATIENT)
Dept: FAMILY MEDICINE | Facility: CLINIC | Age: 50
End: 2020-04-24

## 2020-04-24 DIAGNOSIS — I10 ESSENTIAL HYPERTENSION: ICD-10-CM

## 2020-04-24 DIAGNOSIS — G43.809 OTHER MIGRAINE WITHOUT STATUS MIGRAINOSUS, NOT INTRACTABLE: ICD-10-CM

## 2020-04-29 ENCOUNTER — OFFICE VISIT - HEALTHEAST (OUTPATIENT)
Dept: PULMONOLOGY | Facility: OTHER | Age: 50
End: 2020-04-29

## 2020-04-29 DIAGNOSIS — J45.909 UNCOMPLICATED ASTHMA, UNSPECIFIED ASTHMA SEVERITY, UNSPECIFIED WHETHER PERSISTENT: ICD-10-CM

## 2020-04-29 DIAGNOSIS — J45.41 MODERATE PERSISTENT ASTHMA WITH EXACERBATION: ICD-10-CM

## 2020-05-01 ENCOUNTER — OFFICE VISIT - HEALTHEAST (OUTPATIENT)
Dept: FAMILY MEDICINE | Facility: CLINIC | Age: 50
End: 2020-05-01

## 2020-05-01 DIAGNOSIS — J45.40 MODERATE PERSISTENT ASTHMA, UNSPECIFIED WHETHER COMPLICATED: ICD-10-CM

## 2020-05-01 DIAGNOSIS — E11.9 TYPE 2 DIABETES MELLITUS TREATED WITHOUT INSULIN (H): ICD-10-CM

## 2020-05-01 DIAGNOSIS — I10 ESSENTIAL HYPERTENSION: ICD-10-CM

## 2020-05-01 DIAGNOSIS — G43.809 OTHER MIGRAINE WITHOUT STATUS MIGRAINOSUS, NOT INTRACTABLE: ICD-10-CM

## 2020-05-26 ENCOUNTER — COMMUNICATION - HEALTHEAST (OUTPATIENT)
Dept: FAMILY MEDICINE | Facility: CLINIC | Age: 50
End: 2020-05-26

## 2020-05-26 DIAGNOSIS — I10 ESSENTIAL HYPERTENSION: ICD-10-CM

## 2020-06-01 ENCOUNTER — COMMUNICATION - HEALTHEAST (OUTPATIENT)
Dept: SCHEDULING | Facility: CLINIC | Age: 50
End: 2020-06-01

## 2020-06-01 ENCOUNTER — COMMUNICATION - HEALTHEAST (OUTPATIENT)
Dept: FAMILY MEDICINE | Facility: CLINIC | Age: 50
End: 2020-06-01

## 2020-06-01 ENCOUNTER — NURSE TRIAGE (OUTPATIENT)
Dept: NURSING | Facility: CLINIC | Age: 50
End: 2020-06-01

## 2020-06-01 DIAGNOSIS — I25.119 CORONARY ARTERY DISEASE INVOLVING NATIVE CORONARY ARTERY OF NATIVE HEART WITH ANGINA PECTORIS (H): ICD-10-CM
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
## 2020-06-04 ENCOUNTER — COMMUNICATION - HEALTHEAST (OUTPATIENT)
Dept: PULMONOLOGY | Facility: OTHER | Age: 50
End: 2020-06-04

## 2020-06-04 ENCOUNTER — AMBULATORY - HEALTHEAST (OUTPATIENT)
Dept: PULMONOLOGY | Facility: OTHER | Age: 50
End: 2020-06-04

## 2020-06-04 ENCOUNTER — COMMUNICATION - HEALTHEAST (OUTPATIENT)
Dept: FAMILY MEDICINE | Facility: CLINIC | Age: 50
End: 2020-06-04

## 2020-06-04 DIAGNOSIS — R05.9 COUGH: ICD-10-CM

## 2020-06-04 DIAGNOSIS — J45.41 MODERATE PERSISTENT ASTHMA WITH EXACERBATION: ICD-10-CM

## 2020-06-07 ENCOUNTER — COMMUNICATION - HEALTHEAST (OUTPATIENT)
Dept: FAMILY MEDICINE | Facility: CLINIC | Age: 50
End: 2020-06-07

## 2020-06-07 DIAGNOSIS — I25.119 CORONARY ARTERY DISEASE INVOLVING NATIVE CORONARY ARTERY OF NATIVE HEART WITH ANGINA PECTORIS (H): ICD-10-CM

## 2020-06-08 ENCOUNTER — COMMUNICATION - HEALTHEAST (OUTPATIENT)
Dept: PULMONOLOGY | Facility: OTHER | Age: 50
End: 2020-06-08

## 2020-06-16 ENCOUNTER — COMMUNICATION - HEALTHEAST (OUTPATIENT)
Dept: FAMILY MEDICINE | Facility: CLINIC | Age: 50
End: 2020-06-16

## 2020-06-16 ENCOUNTER — RECORDS - HEALTHEAST (OUTPATIENT)
Dept: ADMINISTRATIVE | Facility: OTHER | Age: 50
End: 2020-06-16

## 2020-06-16 DIAGNOSIS — G43.809 OTHER MIGRAINE WITHOUT STATUS MIGRAINOSUS, NOT INTRACTABLE: ICD-10-CM

## 2020-06-23 ENCOUNTER — OFFICE VISIT - HEALTHEAST (OUTPATIENT)
Dept: PULMONOLOGY | Facility: OTHER | Age: 50
End: 2020-06-23

## 2020-06-23 DIAGNOSIS — J45.41 MODERATE PERSISTENT ASTHMA WITH EXACERBATION: ICD-10-CM

## 2020-06-23 DIAGNOSIS — J45.909 UNCOMPLICATED ASTHMA, UNSPECIFIED ASTHMA SEVERITY, UNSPECIFIED WHETHER PERSISTENT: ICD-10-CM

## 2020-06-23 ASSESSMENT — MIFFLIN-ST. JEOR: SCORE: 1154.27

## 2020-06-24 ENCOUNTER — OFFICE VISIT - HEALTHEAST (OUTPATIENT)
Dept: FAMILY MEDICINE | Facility: CLINIC | Age: 50
End: 2020-06-24

## 2020-06-24 DIAGNOSIS — M79.661 PAIN IN BOTH LOWER LEGS: ICD-10-CM

## 2020-06-24 DIAGNOSIS — R51.9 CHRONIC NONINTRACTABLE HEADACHE, UNSPECIFIED HEADACHE TYPE: ICD-10-CM

## 2020-06-24 DIAGNOSIS — M79.662 PAIN IN BOTH LOWER LEGS: ICD-10-CM

## 2020-06-24 DIAGNOSIS — E11.9 TYPE 2 DIABETES MELLITUS TREATED WITHOUT INSULIN (H): ICD-10-CM

## 2020-06-24 DIAGNOSIS — G89.29 CHRONIC NONINTRACTABLE HEADACHE, UNSPECIFIED HEADACHE TYPE: ICD-10-CM

## 2020-06-24 LAB
CHOLEST SERPL-MCNC: 128 MG/DL
FASTING STATUS PATIENT QL REPORTED: YES
HBA1C MFR BLD: 6.3 % (ref 3.5–6)
HDLC SERPL-MCNC: 45 MG/DL
LDLC SERPL CALC-MCNC: 48 MG/DL
TRIGL SERPL-MCNC: 175 MG/DL

## 2020-06-24 ASSESSMENT — MIFFLIN-ST. JEOR: SCORE: 1152

## 2020-06-25 ENCOUNTER — COMMUNICATION - HEALTHEAST (OUTPATIENT)
Dept: SCHEDULING | Facility: CLINIC | Age: 50
End: 2020-06-25

## 2020-06-26 ENCOUNTER — COMMUNICATION - HEALTHEAST (OUTPATIENT)
Dept: PULMONOLOGY | Facility: OTHER | Age: 50
End: 2020-06-26

## 2020-06-29 ENCOUNTER — HOME CARE/HOSPICE - HEALTHEAST (OUTPATIENT)
Dept: HOME HEALTH SERVICES | Facility: HOME HEALTH | Age: 50
End: 2020-06-29

## 2020-06-30 ENCOUNTER — OFFICE VISIT - HEALTHEAST (OUTPATIENT)
Dept: FAMILY MEDICINE | Facility: CLINIC | Age: 50
End: 2020-06-30

## 2020-06-30 DIAGNOSIS — J45.40 MODERATE PERSISTENT ASTHMA, UNSPECIFIED WHETHER COMPLICATED: ICD-10-CM

## 2020-06-30 DIAGNOSIS — Z09 HOSPITAL DISCHARGE FOLLOW-UP: ICD-10-CM

## 2020-06-30 DIAGNOSIS — I25.119 CORONARY ARTERY DISEASE INVOLVING NATIVE CORONARY ARTERY OF NATIVE HEART WITH ANGINA PECTORIS (H): ICD-10-CM

## 2020-06-30 DIAGNOSIS — R07.9 CHEST PAIN, UNSPECIFIED TYPE: ICD-10-CM

## 2020-07-01 ENCOUNTER — COMMUNICATION - HEALTHEAST (OUTPATIENT)
Dept: PHARMACY | Facility: CLINIC | Age: 50
End: 2020-07-01

## 2020-07-01 ENCOUNTER — HOME CARE/HOSPICE - HEALTHEAST (OUTPATIENT)
Dept: HOME HEALTH SERVICES | Facility: HOME HEALTH | Age: 50
End: 2020-07-01

## 2020-07-03 ENCOUNTER — AMBULATORY - HEALTHEAST (OUTPATIENT)
Dept: PHARMACY | Facility: CLINIC | Age: 50
End: 2020-07-03

## 2020-07-03 DIAGNOSIS — I25.83 CORONARY ARTERY DISEASE DUE TO LIPID RICH PLAQUE: ICD-10-CM

## 2020-07-03 DIAGNOSIS — E11.9 TYPE 2 DIABETES MELLITUS TREATED WITHOUT INSULIN (H): ICD-10-CM

## 2020-07-03 DIAGNOSIS — I10 ESSENTIAL HYPERTENSION: ICD-10-CM

## 2020-07-03 DIAGNOSIS — K21.9 GASTROESOPHAGEAL REFLUX DISEASE WITHOUT ESOPHAGITIS: ICD-10-CM

## 2020-07-03 DIAGNOSIS — J45.40 MODERATE PERSISTENT ASTHMA, UNSPECIFIED WHETHER COMPLICATED: ICD-10-CM

## 2020-07-03 DIAGNOSIS — I25.10 CORONARY ARTERY DISEASE DUE TO LIPID RICH PLAQUE: ICD-10-CM

## 2020-07-03 PROCEDURE — 99605 MTMS BY PHARM NP 15 MIN: CPT | Performed by: PHARMACIST

## 2020-07-06 ENCOUNTER — HOME CARE/HOSPICE - HEALTHEAST (OUTPATIENT)
Dept: HOME HEALTH SERVICES | Facility: HOME HEALTH | Age: 50
End: 2020-07-06

## 2020-07-08 ENCOUNTER — COMMUNICATION - HEALTHEAST (OUTPATIENT)
Dept: FAMILY MEDICINE | Facility: CLINIC | Age: 50
End: 2020-07-08

## 2020-07-13 ENCOUNTER — RECORDS - HEALTHEAST (OUTPATIENT)
Dept: ADMINISTRATIVE | Facility: OTHER | Age: 50
End: 2020-07-13

## 2020-07-23 ENCOUNTER — COMMUNICATION - HEALTHEAST (OUTPATIENT)
Dept: PULMONOLOGY | Facility: OTHER | Age: 50
End: 2020-07-23

## 2020-07-23 DIAGNOSIS — R05.9 COUGH: ICD-10-CM

## 2020-07-23 DIAGNOSIS — J45.40 MODERATE PERSISTENT ASTHMA: ICD-10-CM

## 2020-07-27 ENCOUNTER — COMMUNICATION - HEALTHEAST (OUTPATIENT)
Dept: FAMILY MEDICINE | Facility: CLINIC | Age: 50
End: 2020-07-27

## 2020-07-27 DIAGNOSIS — Z95.5 S/P CORONARY ARTERY STENT PLACEMENT: ICD-10-CM

## 2020-08-04 ENCOUNTER — RECORDS - HEALTHEAST (OUTPATIENT)
Dept: MAMMOGRAPHY | Facility: CLINIC | Age: 50
End: 2020-08-04

## 2020-08-04 ENCOUNTER — OFFICE VISIT - HEALTHEAST (OUTPATIENT)
Dept: FAMILY MEDICINE | Facility: CLINIC | Age: 50
End: 2020-08-04

## 2020-08-04 DIAGNOSIS — R51.9 CHRONIC NONINTRACTABLE HEADACHE, UNSPECIFIED HEADACHE TYPE: ICD-10-CM

## 2020-08-04 DIAGNOSIS — Z12.31 ENCOUNTER FOR SCREENING MAMMOGRAM FOR MALIGNANT NEOPLASM OF BREAST: ICD-10-CM

## 2020-08-04 DIAGNOSIS — I25.83 CORONARY ARTERY DISEASE DUE TO LIPID RICH PLAQUE: ICD-10-CM

## 2020-08-04 DIAGNOSIS — E11.9 TYPE 2 DIABETES MELLITUS TREATED WITHOUT INSULIN (H): ICD-10-CM

## 2020-08-04 DIAGNOSIS — I10 ESSENTIAL HYPERTENSION: ICD-10-CM

## 2020-08-04 DIAGNOSIS — E78.5 HYPERLIPIDEMIA, UNSPECIFIED HYPERLIPIDEMIA TYPE: ICD-10-CM

## 2020-08-04 DIAGNOSIS — I25.10 CORONARY ARTERY DISEASE DUE TO LIPID RICH PLAQUE: ICD-10-CM

## 2020-08-04 DIAGNOSIS — G89.29 CHRONIC NONINTRACTABLE HEADACHE, UNSPECIFIED HEADACHE TYPE: ICD-10-CM

## 2020-08-04 DIAGNOSIS — R05.9 COUGH: ICD-10-CM

## 2020-08-04 ASSESSMENT — MIFFLIN-ST. JEOR: SCORE: 1163.34

## 2020-08-13 ENCOUNTER — OFFICE VISIT - HEALTHEAST (OUTPATIENT)
Dept: PULMONOLOGY | Facility: OTHER | Age: 50
End: 2020-08-13

## 2020-08-13 DIAGNOSIS — J45.40 MODERATE PERSISTENT ASTHMA WITHOUT COMPLICATION: ICD-10-CM

## 2020-08-13 ASSESSMENT — MIFFLIN-ST. JEOR: SCORE: 1158.81

## 2020-08-15 ENCOUNTER — COMMUNICATION - HEALTHEAST (OUTPATIENT)
Dept: SCHEDULING | Facility: CLINIC | Age: 50
End: 2020-08-15

## 2020-08-17 ENCOUNTER — COMMUNICATION - HEALTHEAST (OUTPATIENT)
Dept: PULMONOLOGY | Facility: OTHER | Age: 50
End: 2020-08-17

## 2020-08-17 DIAGNOSIS — J45.909 ASTHMA: ICD-10-CM

## 2020-08-26 ENCOUNTER — COMMUNICATION - HEALTHEAST (OUTPATIENT)
Dept: PHARMACY | Facility: CLINIC | Age: 50
End: 2020-08-26

## 2020-08-27 ENCOUNTER — OFFICE VISIT - HEALTHEAST (OUTPATIENT)
Dept: FAMILY MEDICINE | Facility: CLINIC | Age: 50
End: 2020-08-27

## 2020-08-27 ENCOUNTER — RECORDS - HEALTHEAST (OUTPATIENT)
Dept: ADMINISTRATIVE | Facility: OTHER | Age: 50
End: 2020-08-27

## 2020-08-27 DIAGNOSIS — R13.10 DYSPHAGIA, UNSPECIFIED TYPE: ICD-10-CM

## 2020-08-27 DIAGNOSIS — M54.2 NECK PAIN: ICD-10-CM

## 2020-08-27 DIAGNOSIS — R06.02 SHORTNESS OF BREATH: ICD-10-CM

## 2020-08-28 ENCOUNTER — OFFICE VISIT - HEALTHEAST (OUTPATIENT)
Dept: FAMILY MEDICINE | Facility: CLINIC | Age: 50
End: 2020-08-28

## 2020-08-28 DIAGNOSIS — R13.10 DYSPHAGIA, UNSPECIFIED TYPE: ICD-10-CM

## 2020-08-28 DIAGNOSIS — E63.9 NUTRITIONAL DEFICIENCY: ICD-10-CM

## 2020-08-28 DIAGNOSIS — B59 PNEUMONIA DUE TO PNEUMOCYSTIS JIROVECII, UNSPECIFIED LATERALITY, UNSPECIFIED PART OF LUNG (H): ICD-10-CM

## 2020-08-28 DIAGNOSIS — R06.02 SHORTNESS OF BREATH: ICD-10-CM

## 2020-08-28 DIAGNOSIS — R11.0 NAUSEA: ICD-10-CM

## 2020-08-28 ASSESSMENT — MIFFLIN-ST. JEOR: SCORE: 1161.64

## 2020-08-31 ENCOUNTER — HOSPITAL ENCOUNTER (OUTPATIENT)
Dept: ULTRASOUND IMAGING | Facility: HOSPITAL | Age: 50
Discharge: HOME | End: 2020-08-31
Attending: FAMILY MEDICINE

## 2020-08-31 ENCOUNTER — HOSPITAL ENCOUNTER (OUTPATIENT)
Dept: RADIOLOGY | Facility: HOSPITAL | Age: 50
Discharge: HOME | End: 2020-08-31
Attending: FAMILY MEDICINE

## 2020-08-31 ENCOUNTER — RECORDS - HEALTHEAST (OUTPATIENT)
Dept: ADMINISTRATIVE | Facility: OTHER | Age: 50
End: 2020-08-31

## 2020-08-31 DIAGNOSIS — M54.2 NECK PAIN: ICD-10-CM

## 2020-08-31 DIAGNOSIS — R13.10 DYSPHAGIA, UNSPECIFIED TYPE: ICD-10-CM

## 2020-09-04 ENCOUNTER — COMMUNICATION - HEALTHEAST (OUTPATIENT)
Dept: FAMILY MEDICINE | Facility: CLINIC | Age: 50
End: 2020-09-04

## 2020-09-04 ENCOUNTER — OFFICE VISIT - HEALTHEAST (OUTPATIENT)
Dept: FAMILY MEDICINE | Facility: CLINIC | Age: 50
End: 2020-09-04

## 2020-09-04 DIAGNOSIS — J44.9 CHRONIC OBSTRUCTIVE PULMONARY DISEASE, UNSPECIFIED COPD TYPE (H): ICD-10-CM

## 2020-09-04 DIAGNOSIS — E11.9 TYPE 2 DIABETES MELLITUS TREATED WITHOUT INSULIN (H): ICD-10-CM

## 2020-09-04 DIAGNOSIS — R13.10 DYSPHAGIA, UNSPECIFIED TYPE: ICD-10-CM

## 2020-09-04 DIAGNOSIS — K21.9 GERD (GASTROESOPHAGEAL REFLUX DISEASE): ICD-10-CM

## 2020-09-04 DIAGNOSIS — I25.10 CORONARY ARTERY DISEASE DUE TO LIPID RICH PLAQUE: ICD-10-CM

## 2020-09-04 DIAGNOSIS — R07.0 THROAT PAIN: ICD-10-CM

## 2020-09-04 DIAGNOSIS — M54.2 NECK PAIN: ICD-10-CM

## 2020-09-04 DIAGNOSIS — I25.83 CORONARY ARTERY DISEASE DUE TO LIPID RICH PLAQUE: ICD-10-CM

## 2020-09-04 LAB — DEPRECATED S PYO AG THROAT QL EIA: NORMAL

## 2020-09-04 ASSESSMENT — MIFFLIN-ST. JEOR: SCORE: 1170.42

## 2020-09-05 LAB — GROUP A STREP BY PCR: NORMAL

## 2020-09-10 ENCOUNTER — COMMUNICATION - HEALTHEAST (OUTPATIENT)
Dept: CARDIOLOGY | Facility: CLINIC | Age: 50
End: 2020-09-10

## 2020-09-10 ENCOUNTER — OFFICE VISIT - HEALTHEAST (OUTPATIENT)
Dept: PHARMACY | Facility: CLINIC | Age: 50
End: 2020-09-10

## 2020-09-10 DIAGNOSIS — J45.40 MODERATE PERSISTENT ASTHMA, UNSPECIFIED WHETHER COMPLICATED: ICD-10-CM

## 2020-09-10 DIAGNOSIS — I25.83 CORONARY ARTERY DISEASE DUE TO LIPID RICH PLAQUE: ICD-10-CM

## 2020-09-10 DIAGNOSIS — E11.9 TYPE 2 DIABETES MELLITUS TREATED WITHOUT INSULIN (H): ICD-10-CM

## 2020-09-10 DIAGNOSIS — I10 ESSENTIAL HYPERTENSION: ICD-10-CM

## 2020-09-10 DIAGNOSIS — I25.10 CORONARY ARTERY DISEASE DUE TO LIPID RICH PLAQUE: ICD-10-CM

## 2020-09-10 DIAGNOSIS — K21.9 GASTROESOPHAGEAL REFLUX DISEASE WITHOUT ESOPHAGITIS: ICD-10-CM

## 2020-09-10 DIAGNOSIS — J18.9 PNEUMONIA OF LEFT LOWER LOBE DUE TO INFECTIOUS ORGANISM: ICD-10-CM

## 2020-09-11 ENCOUNTER — OFFICE VISIT - HEALTHEAST (OUTPATIENT)
Dept: CARDIOLOGY | Facility: CLINIC | Age: 50
End: 2020-09-11

## 2020-09-11 DIAGNOSIS — I10 ESSENTIAL HYPERTENSION: ICD-10-CM

## 2020-09-11 DIAGNOSIS — R07.2 PRECORDIAL PAIN: ICD-10-CM

## 2020-09-11 DIAGNOSIS — I31.9 PERICARDITIS: ICD-10-CM

## 2020-09-11 DIAGNOSIS — I25.10 CORONARY ARTERY DISEASE INVOLVING NATIVE CORONARY ARTERY OF NATIVE HEART WITHOUT ANGINA PECTORIS: ICD-10-CM

## 2020-09-11 DIAGNOSIS — E78.5 DYSLIPIDEMIA: ICD-10-CM

## 2020-09-11 DIAGNOSIS — I47.10 SVT (SUPRAVENTRICULAR TACHYCARDIA) (H): ICD-10-CM

## 2020-09-11 ASSESSMENT — MIFFLIN-ST. JEOR: SCORE: 1174.68

## 2020-09-14 ENCOUNTER — RECORDS - HEALTHEAST (OUTPATIENT)
Dept: ADMINISTRATIVE | Facility: OTHER | Age: 50
End: 2020-09-14

## 2020-09-14 ENCOUNTER — OFFICE VISIT - HEALTHEAST (OUTPATIENT)
Dept: PULMONOLOGY | Facility: OTHER | Age: 50
End: 2020-09-14

## 2020-09-14 DIAGNOSIS — J18.9 PNEUMONIA OF LEFT LOWER LOBE DUE TO INFECTIOUS ORGANISM: ICD-10-CM

## 2020-09-14 DIAGNOSIS — R07.9 CHEST PAIN, UNSPECIFIED TYPE: ICD-10-CM

## 2020-09-14 DIAGNOSIS — R13.10 DYSPHAGIA, UNSPECIFIED TYPE: ICD-10-CM

## 2020-09-14 DIAGNOSIS — J45.40 MODERATE PERSISTENT ASTHMA, UNSPECIFIED WHETHER COMPLICATED: ICD-10-CM

## 2020-09-14 ASSESSMENT — MIFFLIN-ST. JEOR: SCORE: 1174.68

## 2020-09-15 ENCOUNTER — AMBULATORY - HEALTHEAST (OUTPATIENT)
Dept: GASTROENTEROLOGY | Facility: HOSPITAL | Age: 50
End: 2020-09-15

## 2020-09-15 DIAGNOSIS — Z11.59 ENCOUNTER FOR SCREENING FOR OTHER VIRAL DISEASES: ICD-10-CM

## 2020-09-16 ENCOUNTER — OFFICE VISIT - HEALTHEAST (OUTPATIENT)
Dept: FAMILY MEDICINE | Facility: CLINIC | Age: 50
End: 2020-09-16

## 2020-09-16 DIAGNOSIS — E63.9 NUTRITIONAL DEFICIENCY: ICD-10-CM

## 2020-09-16 DIAGNOSIS — E11.9 TYPE 2 DIABETES MELLITUS TREATED WITHOUT INSULIN (H): ICD-10-CM

## 2020-09-16 DIAGNOSIS — J45.40 MODERATE PERSISTENT ASTHMA WITHOUT COMPLICATION: ICD-10-CM

## 2020-09-16 DIAGNOSIS — I10 ESSENTIAL HYPERTENSION: ICD-10-CM

## 2020-09-16 DIAGNOSIS — E78.00 PURE HYPERCHOLESTEROLEMIA: ICD-10-CM

## 2020-09-16 DIAGNOSIS — I25.83 CORONARY ARTERY DISEASE DUE TO LIPID RICH PLAQUE: ICD-10-CM

## 2020-09-16 DIAGNOSIS — J02.8 PHARYNGITIS DUE TO OTHER ORGANISM: ICD-10-CM

## 2020-09-16 DIAGNOSIS — I25.10 CORONARY ARTERY DISEASE DUE TO LIPID RICH PLAQUE: ICD-10-CM

## 2020-09-16 ASSESSMENT — MIFFLIN-ST. JEOR: SCORE: 1172.41

## 2020-09-17 ENCOUNTER — AMBULATORY - HEALTHEAST (OUTPATIENT)
Dept: LAB | Facility: CLINIC | Age: 50
End: 2020-09-17

## 2020-09-17 DIAGNOSIS — J02.8 PHARYNGITIS DUE TO OTHER ORGANISM: ICD-10-CM

## 2020-09-18 ENCOUNTER — RECORDS - HEALTHEAST (OUTPATIENT)
Dept: HEALTH INFORMATION MANAGEMENT | Facility: CLINIC | Age: 50
End: 2020-09-18

## 2020-09-25 ENCOUNTER — COMMUNICATION - HEALTHEAST (OUTPATIENT)
Dept: FAMILY MEDICINE | Facility: CLINIC | Age: 50
End: 2020-09-25

## 2020-10-05 ENCOUNTER — COMMUNICATION - HEALTHEAST (OUTPATIENT)
Dept: FAMILY MEDICINE | Facility: CLINIC | Age: 50
End: 2020-10-05

## 2020-10-05 ENCOUNTER — COMMUNICATION - HEALTHEAST (OUTPATIENT)
Dept: PULMONOLOGY | Facility: OTHER | Age: 50
End: 2020-10-05

## 2020-10-05 DIAGNOSIS — J45.909 ASTHMA: ICD-10-CM

## 2020-10-05 DIAGNOSIS — R05.9 COUGH: ICD-10-CM

## 2020-10-05 LAB — H PYLORI AG STL QL IA: NEGATIVE

## 2020-10-06 ENCOUNTER — OFFICE VISIT - HEALTHEAST (OUTPATIENT)
Dept: FAMILY MEDICINE | Facility: CLINIC | Age: 50
End: 2020-10-06

## 2020-10-06 DIAGNOSIS — K59.00 CONSTIPATION, UNSPECIFIED CONSTIPATION TYPE: ICD-10-CM

## 2020-10-06 DIAGNOSIS — I25.83 CORONARY ARTERY DISEASE DUE TO LIPID RICH PLAQUE: ICD-10-CM

## 2020-10-06 DIAGNOSIS — E11.9 TYPE 2 DIABETES MELLITUS TREATED WITHOUT INSULIN (H): ICD-10-CM

## 2020-10-06 DIAGNOSIS — J45.40 MODERATE PERSISTENT ASTHMA WITHOUT COMPLICATION: ICD-10-CM

## 2020-10-06 DIAGNOSIS — K21.9 GASTROESOPHAGEAL REFLUX DISEASE, UNSPECIFIED WHETHER ESOPHAGITIS PRESENT: ICD-10-CM

## 2020-10-06 DIAGNOSIS — M79.661 PAIN IN BOTH LOWER LEGS: ICD-10-CM

## 2020-10-06 DIAGNOSIS — E78.00 PURE HYPERCHOLESTEROLEMIA: ICD-10-CM

## 2020-10-06 DIAGNOSIS — G43.809 OTHER MIGRAINE WITHOUT STATUS MIGRAINOSUS, NOT INTRACTABLE: ICD-10-CM

## 2020-10-06 DIAGNOSIS — M79.662 PAIN IN BOTH LOWER LEGS: ICD-10-CM

## 2020-10-06 DIAGNOSIS — I25.10 CORONARY ARTERY DISEASE DUE TO LIPID RICH PLAQUE: ICD-10-CM

## 2020-10-06 LAB — HBA1C MFR BLD: 7.5 %

## 2020-10-06 ASSESSMENT — MIFFLIN-ST. JEOR: SCORE: 1179.21

## 2020-10-21 ENCOUNTER — COMMUNICATION - HEALTHEAST (OUTPATIENT)
Dept: CARDIOLOGY | Facility: CLINIC | Age: 50
End: 2020-10-21

## 2020-10-22 ENCOUNTER — OFFICE VISIT - HEALTHEAST (OUTPATIENT)
Dept: CARDIOLOGY | Facility: CLINIC | Age: 50
End: 2020-10-22

## 2020-10-22 DIAGNOSIS — I10 ESSENTIAL HYPERTENSION: ICD-10-CM

## 2020-10-22 DIAGNOSIS — R07.2 PRECORDIAL PAIN: ICD-10-CM

## 2020-10-22 DIAGNOSIS — I25.10 CORONARY ARTERY DISEASE INVOLVING NATIVE CORONARY ARTERY OF NATIVE HEART WITHOUT ANGINA PECTORIS: ICD-10-CM

## 2020-10-22 DIAGNOSIS — E78.5 DYSLIPIDEMIA: ICD-10-CM

## 2020-10-22 DIAGNOSIS — I31.9 PERICARDITIS: ICD-10-CM

## 2020-10-22 ASSESSMENT — MIFFLIN-ST. JEOR: SCORE: 1183.75

## 2020-10-23 ENCOUNTER — OFFICE VISIT - HEALTHEAST (OUTPATIENT)
Dept: PULMONOLOGY | Facility: OTHER | Age: 50
End: 2020-10-23

## 2020-10-23 DIAGNOSIS — J45.40 MODERATE PERSISTENT ASTHMA, UNSPECIFIED WHETHER COMPLICATED: ICD-10-CM

## 2020-10-23 DIAGNOSIS — J45.40 MODERATE PERSISTENT ASTHMA WITHOUT COMPLICATION: ICD-10-CM

## 2020-10-23 ASSESSMENT — MIFFLIN-ST. JEOR: SCORE: 1192.82

## 2020-10-26 ENCOUNTER — COMMUNICATION - HEALTHEAST (OUTPATIENT)
Dept: PULMONOLOGY | Facility: OTHER | Age: 50
End: 2020-10-26

## 2020-11-03 ENCOUNTER — COMMUNICATION - HEALTHEAST (OUTPATIENT)
Dept: FAMILY MEDICINE | Facility: CLINIC | Age: 50
End: 2020-11-03

## 2020-11-03 DIAGNOSIS — E11.9 TYPE 2 DIABETES MELLITUS TREATED WITHOUT INSULIN (H): ICD-10-CM

## 2020-11-10 ENCOUNTER — RECORDS - HEALTHEAST (OUTPATIENT)
Dept: ADMINISTRATIVE | Facility: OTHER | Age: 50
End: 2020-11-10

## 2020-11-10 LAB — RETINOPATHY: NEGATIVE

## 2020-11-12 ENCOUNTER — RECORDS - HEALTHEAST (OUTPATIENT)
Dept: HEALTH INFORMATION MANAGEMENT | Facility: CLINIC | Age: 50
End: 2020-11-12

## 2020-11-16 ENCOUNTER — COMMUNICATION - HEALTHEAST (OUTPATIENT)
Dept: FAMILY MEDICINE | Facility: CLINIC | Age: 50
End: 2020-11-16

## 2020-11-23 ENCOUNTER — OFFICE VISIT - HEALTHEAST (OUTPATIENT)
Dept: FAMILY MEDICINE | Facility: CLINIC | Age: 50
End: 2020-11-23

## 2020-11-23 DIAGNOSIS — M79.10 MYALGIA: ICD-10-CM

## 2020-11-23 DIAGNOSIS — J45.40 MODERATE PERSISTENT ASTHMA WITHOUT COMPLICATION: ICD-10-CM

## 2020-11-23 DIAGNOSIS — J47.9 BRONCHIECTASIS WITHOUT COMPLICATION (H): ICD-10-CM

## 2020-11-23 DIAGNOSIS — R05.9 COUGH: ICD-10-CM

## 2020-11-23 ASSESSMENT — MIFFLIN-ST. JEOR: SCORE: 1182.04

## 2020-11-25 ENCOUNTER — COMMUNICATION - HEALTHEAST (OUTPATIENT)
Dept: SCHEDULING | Facility: CLINIC | Age: 50
End: 2020-11-25

## 2020-11-30 ENCOUNTER — AMBULATORY - HEALTHEAST (OUTPATIENT)
Dept: LAB | Facility: CLINIC | Age: 50
End: 2020-11-30

## 2020-11-30 ENCOUNTER — OFFICE VISIT - HEALTHEAST (OUTPATIENT)
Dept: FAMILY MEDICINE | Facility: CLINIC | Age: 50
End: 2020-11-30

## 2020-11-30 ENCOUNTER — AMBULATORY - HEALTHEAST (OUTPATIENT)
Dept: FAMILY MEDICINE | Facility: CLINIC | Age: 50
End: 2020-11-30

## 2020-11-30 DIAGNOSIS — H93.13 TINNITUS, BILATERAL: ICD-10-CM

## 2020-11-30 DIAGNOSIS — R51.9 INTRACTABLE HEADACHE, UNSPECIFIED CHRONICITY PATTERN, UNSPECIFIED HEADACHE TYPE: ICD-10-CM

## 2020-11-30 DIAGNOSIS — R11.0 NAUSEA: ICD-10-CM

## 2020-11-30 DIAGNOSIS — R42 DIZZINESS: ICD-10-CM

## 2020-11-30 DIAGNOSIS — Z11.59 ENCOUNTER FOR SCREENING FOR OTHER VIRAL DISEASES: ICD-10-CM

## 2020-12-01 ENCOUNTER — OFFICE VISIT - HEALTHEAST (OUTPATIENT)
Dept: FAMILY MEDICINE | Facility: CLINIC | Age: 50
End: 2020-12-01

## 2020-12-01 DIAGNOSIS — J45.40 MODERATE PERSISTENT ASTHMA WITHOUT COMPLICATION: ICD-10-CM

## 2020-12-01 DIAGNOSIS — D64.9 ANEMIA, UNSPECIFIED TYPE: ICD-10-CM

## 2020-12-01 DIAGNOSIS — R42 DIZZINESS: ICD-10-CM

## 2020-12-01 DIAGNOSIS — E11.9 TYPE 2 DIABETES MELLITUS TREATED WITHOUT INSULIN (H): ICD-10-CM

## 2020-12-01 LAB
BASOPHILS # BLD AUTO: 0.1 THOU/UL (ref 0–0.2)
BASOPHILS NFR BLD AUTO: 1 % (ref 0–2)
EOSINOPHIL # BLD AUTO: 0.7 THOU/UL (ref 0–0.4)
EOSINOPHIL NFR BLD AUTO: 8 % (ref 0–6)
ERYTHROCYTE [DISTWIDTH] IN BLOOD BY AUTOMATED COUNT: 13.8 % (ref 11–14.5)
HCT VFR BLD AUTO: 40.1 % (ref 35–47)
HGB BLD-MCNC: 13.5 G/DL (ref 12–16)
LYMPHOCYTES # BLD AUTO: 1.7 THOU/UL (ref 0.8–4.4)
LYMPHOCYTES NFR BLD AUTO: 21 % (ref 20–40)
MCH RBC QN AUTO: 28.9 PG (ref 27–34)
MCHC RBC AUTO-ENTMCNC: 33.7 G/DL (ref 32–36)
MCV RBC AUTO: 86 FL (ref 80–100)
MONOCYTES # BLD AUTO: 0.3 THOU/UL (ref 0–0.9)
MONOCYTES NFR BLD AUTO: 4 % (ref 2–10)
NEUTROPHILS # BLD AUTO: 5.3 THOU/UL (ref 2–7.7)
NEUTROPHILS NFR BLD AUTO: 66 % (ref 50–70)
PLATELET # BLD AUTO: 178 THOU/UL (ref 140–440)
PMV BLD AUTO: 9.9 FL (ref 7–10)
RBC # BLD AUTO: 4.67 MILL/UL (ref 3.8–5.4)
WBC: 8.1 THOU/UL (ref 4–11)

## 2020-12-01 ASSESSMENT — MIFFLIN-ST. JEOR: SCORE: 1183.8

## 2020-12-02 ENCOUNTER — COMMUNICATION - HEALTHEAST (OUTPATIENT)
Dept: SCHEDULING | Facility: CLINIC | Age: 50
End: 2020-12-02

## 2020-12-03 ENCOUNTER — SURGERY - HEALTHEAST (OUTPATIENT)
Dept: GASTROENTEROLOGY | Facility: HOSPITAL | Age: 50
End: 2020-12-03

## 2020-12-03 ASSESSMENT — MIFFLIN-ST. JEOR: SCORE: 1183.75

## 2020-12-06 ENCOUNTER — COMMUNICATION - HEALTHEAST (OUTPATIENT)
Dept: FAMILY MEDICINE | Facility: CLINIC | Age: 50
End: 2020-12-06

## 2020-12-06 DIAGNOSIS — M79.662 PAIN IN BOTH LOWER LEGS: ICD-10-CM

## 2020-12-06 DIAGNOSIS — M79.661 PAIN IN BOTH LOWER LEGS: ICD-10-CM

## 2020-12-08 ENCOUNTER — AMBULATORY - HEALTHEAST (OUTPATIENT)
Dept: CARE COORDINATION | Facility: CLINIC | Age: 50
End: 2020-12-08

## 2020-12-08 ENCOUNTER — COMMUNICATION - HEALTHEAST (OUTPATIENT)
Dept: FAMILY MEDICINE | Facility: CLINIC | Age: 50
End: 2020-12-08

## 2020-12-08 DIAGNOSIS — R05.9 COUGH: ICD-10-CM

## 2020-12-08 DIAGNOSIS — J44.9 CHRONIC OBSTRUCTIVE PULMONARY DISEASE, UNSPECIFIED COPD TYPE (H): ICD-10-CM

## 2020-12-08 DIAGNOSIS — I10 ESSENTIAL HYPERTENSION: ICD-10-CM

## 2020-12-09 ENCOUNTER — COMMUNICATION - HEALTHEAST (OUTPATIENT)
Dept: SCHEDULING | Facility: CLINIC | Age: 50
End: 2020-12-09

## 2020-12-09 ENCOUNTER — COMMUNICATION - HEALTHEAST (OUTPATIENT)
Dept: CARE COORDINATION | Facility: CLINIC | Age: 50
End: 2020-12-09

## 2020-12-10 ENCOUNTER — RECORDS - HEALTHEAST (OUTPATIENT)
Dept: ADMINISTRATIVE | Facility: OTHER | Age: 50
End: 2020-12-10

## 2020-12-11 ENCOUNTER — OFFICE VISIT - HEALTHEAST (OUTPATIENT)
Dept: FAMILY MEDICINE | Facility: CLINIC | Age: 50
End: 2020-12-11

## 2020-12-11 DIAGNOSIS — R79.89 ABNORMAL LFTS: ICD-10-CM

## 2020-12-11 DIAGNOSIS — E87.6 LOW BLOOD POTASSIUM: ICD-10-CM

## 2020-12-11 DIAGNOSIS — J45.41 MODERATE PERSISTENT ASTHMA WITH EXACERBATION: ICD-10-CM

## 2020-12-11 DIAGNOSIS — R42 DIZZINESS: ICD-10-CM

## 2020-12-11 DIAGNOSIS — R05.9 COUGH: ICD-10-CM

## 2020-12-11 DIAGNOSIS — R79.0 LOW MAGNESIUM LEVEL: ICD-10-CM

## 2020-12-11 LAB
ALBUMIN SERPL-MCNC: 4.2 G/DL (ref 3.5–5)
ALP SERPL-CCNC: 129 U/L (ref 45–120)
ALT SERPL W P-5'-P-CCNC: 104 U/L (ref 0–45)
ANION GAP SERPL CALCULATED.3IONS-SCNC: 15 MMOL/L (ref 5–18)
AST SERPL W P-5'-P-CCNC: 57 U/L (ref 0–40)
BILIRUB SERPL-MCNC: 0.5 MG/DL (ref 0–1)
BUN SERPL-MCNC: 12 MG/DL (ref 8–22)
CALCIUM SERPL-MCNC: 9.6 MG/DL (ref 8.5–10.5)
CHLORIDE BLD-SCNC: 102 MMOL/L (ref 98–107)
CHOLEST SERPL-MCNC: 146 MG/DL
CO2 SERPL-SCNC: 21 MMOL/L (ref 22–31)
CREAT SERPL-MCNC: 0.88 MG/DL (ref 0.6–1.1)
FASTING STATUS PATIENT QL REPORTED: NO
GFR SERPL CREATININE-BSD FRML MDRD: >60 ML/MIN/1.73M2
GLUCOSE BLD-MCNC: 387 MG/DL (ref 70–125)
HDLC SERPL-MCNC: 57 MG/DL
LDLC SERPL CALC-MCNC: 53 MG/DL
MAGNESIUM SERPL-MCNC: 1.7 MG/DL (ref 1.8–2.6)
POTASSIUM BLD-SCNC: 4.5 MMOL/L (ref 3.5–5)
PROT SERPL-MCNC: 7.4 G/DL (ref 6–8)
SODIUM SERPL-SCNC: 138 MMOL/L (ref 136–145)
TRIGL SERPL-MCNC: 180 MG/DL

## 2020-12-11 ASSESSMENT — MIFFLIN-ST. JEOR: SCORE: 1186.02

## 2020-12-14 LAB
HAV IGM SERPL QL IA: NEGATIVE
HBV CORE IGM SERPL QL IA: NEGATIVE
HBV SURFACE AG SERPL QL IA: NEGATIVE
HCV AB SERPL QL IA: NEGATIVE

## 2020-12-21 ENCOUNTER — OFFICE VISIT - HEALTHEAST (OUTPATIENT)
Dept: PULMONOLOGY | Facility: OTHER | Age: 50
End: 2020-12-21

## 2020-12-21 DIAGNOSIS — J45.41 MODERATE PERSISTENT ASTHMA WITH EXACERBATION: ICD-10-CM

## 2020-12-22 ENCOUNTER — COMMUNICATION - HEALTHEAST (OUTPATIENT)
Dept: PULMONOLOGY | Facility: OTHER | Age: 50
End: 2020-12-22

## 2020-12-22 ENCOUNTER — COMMUNICATION - HEALTHEAST (OUTPATIENT)
Dept: FAMILY MEDICINE | Facility: CLINIC | Age: 50
End: 2020-12-22

## 2020-12-22 DIAGNOSIS — Z95.5 S/P CORONARY ARTERY STENT PLACEMENT: ICD-10-CM

## 2020-12-24 ENCOUNTER — COMMUNICATION - HEALTHEAST (OUTPATIENT)
Dept: PULMONOLOGY | Facility: OTHER | Age: 50
End: 2020-12-24

## 2020-12-24 DIAGNOSIS — J45.40 MODERATE PERSISTENT ASTHMA: ICD-10-CM

## 2020-12-28 ENCOUNTER — COMMUNICATION - HEALTHEAST (OUTPATIENT)
Dept: FAMILY MEDICINE | Facility: CLINIC | Age: 50
End: 2020-12-28

## 2020-12-29 ENCOUNTER — COMMUNICATION - HEALTHEAST (OUTPATIENT)
Dept: FAMILY MEDICINE | Facility: CLINIC | Age: 50
End: 2020-12-29

## 2020-12-29 DIAGNOSIS — R42 DIZZINESS: ICD-10-CM

## 2021-01-03 ENCOUNTER — COMMUNICATION - HEALTHEAST (OUTPATIENT)
Dept: CARDIOLOGY | Facility: CLINIC | Age: 51
End: 2021-01-03

## 2021-01-03 DIAGNOSIS — I30.9 ACUTE PERICARDITIS, UNSPECIFIED TYPE: ICD-10-CM

## 2021-01-04 ASSESSMENT — PATIENT HEALTH QUESTIONNAIRE - PHQ9: SUM OF ALL RESPONSES TO PHQ QUESTIONS 1-9: 16

## 2021-01-05 ENCOUNTER — COMMUNICATION - HEALTHEAST (OUTPATIENT)
Dept: SCHEDULING | Facility: CLINIC | Age: 51
End: 2021-01-05

## 2021-01-05 ENCOUNTER — OFFICE VISIT - HEALTHEAST (OUTPATIENT)
Dept: FAMILY MEDICINE | Facility: CLINIC | Age: 51
End: 2021-01-05

## 2021-01-05 DIAGNOSIS — I10 ESSENTIAL HYPERTENSION: ICD-10-CM

## 2021-01-05 DIAGNOSIS — F32.1 MODERATE MAJOR DEPRESSION (H): ICD-10-CM

## 2021-01-05 DIAGNOSIS — D50.9 MICROCYTIC ANEMIA: ICD-10-CM

## 2021-01-05 DIAGNOSIS — E11.9 TYPE 2 DIABETES MELLITUS TREATED WITHOUT INSULIN (H): ICD-10-CM

## 2021-01-05 DIAGNOSIS — Z00.00 ROUTINE GENERAL MEDICAL EXAMINATION AT A HEALTH CARE FACILITY: ICD-10-CM

## 2021-01-05 DIAGNOSIS — J45.40 MODERATE PERSISTENT ASTHMA WITHOUT COMPLICATION: ICD-10-CM

## 2021-01-05 LAB
ALBUMIN SERPL-MCNC: 3.9 G/DL (ref 3.5–5)
ALP SERPL-CCNC: 125 U/L (ref 45–120)
ALT SERPL W P-5'-P-CCNC: 87 U/L (ref 0–45)
ANION GAP SERPL CALCULATED.3IONS-SCNC: 13 MMOL/L (ref 5–18)
AST SERPL W P-5'-P-CCNC: 50 U/L (ref 0–40)
BILIRUB SERPL-MCNC: 0.4 MG/DL (ref 0–1)
BUN SERPL-MCNC: 10 MG/DL (ref 8–22)
CALCIUM SERPL-MCNC: 9.4 MG/DL (ref 8.5–10.5)
CHLORIDE BLD-SCNC: 98 MMOL/L (ref 98–107)
CO2 SERPL-SCNC: 24 MMOL/L (ref 22–31)
CREAT SERPL-MCNC: 0.83 MG/DL (ref 0.6–1.1)
ERYTHROCYTE [DISTWIDTH] IN BLOOD BY AUTOMATED COUNT: 12.1 % (ref 11–14.5)
GFR SERPL CREATININE-BSD FRML MDRD: >60 ML/MIN/1.73M2
GLUCOSE BLD-MCNC: 424 MG/DL (ref 70–125)
HCT VFR BLD AUTO: 43.5 % (ref 35–47)
HGB BLD-MCNC: 13.9 G/DL (ref 12–16)
MCH RBC QN AUTO: 29.4 PG (ref 27–34)
MCHC RBC AUTO-ENTMCNC: 32 G/DL (ref 32–36)
MCV RBC AUTO: 92 FL (ref 80–100)
PLATELET # BLD AUTO: 139 THOU/UL (ref 140–440)
PMV BLD AUTO: 10.5 FL (ref 7–10)
POTASSIUM BLD-SCNC: 4.1 MMOL/L (ref 3.5–5)
PROT SERPL-MCNC: 6.9 G/DL (ref 6–8)
RBC # BLD AUTO: 4.74 MILL/UL (ref 3.8–5.4)
SODIUM SERPL-SCNC: 135 MMOL/L (ref 136–145)
WBC: 8.8 THOU/UL (ref 4–11)

## 2021-01-05 ASSESSMENT — MIFFLIN-ST. JEOR: SCORE: 1194.52

## 2021-01-06 LAB
CHOLEST SERPL-MCNC: 158 MG/DL
HBA1C MFR BLD: 8.5 %
HDLC SERPL-MCNC: 49 MG/DL
LDLC SERPL CALC-MCNC: 49 MG/DL
TRIGL SERPL-MCNC: 299 MG/DL

## 2021-01-14 ENCOUNTER — COMMUNICATION - HEALTHEAST (OUTPATIENT)
Dept: SCHEDULING | Facility: CLINIC | Age: 51
End: 2021-01-14

## 2021-01-15 ENCOUNTER — HOME CARE/HOSPICE - HEALTHEAST (OUTPATIENT)
Dept: HOME HEALTH SERVICES | Facility: HOME HEALTH | Age: 51
End: 2021-01-15

## 2021-01-15 ENCOUNTER — COMMUNICATION - HEALTHEAST (OUTPATIENT)
Dept: FAMILY MEDICINE | Facility: CLINIC | Age: 51
End: 2021-01-15

## 2021-01-15 ENCOUNTER — RECORDS - HEALTHEAST (OUTPATIENT)
Dept: ADMINISTRATIVE | Facility: OTHER | Age: 51
End: 2021-01-15

## 2021-01-18 ENCOUNTER — COMMUNICATION - HEALTHEAST (OUTPATIENT)
Dept: FAMILY MEDICINE | Facility: CLINIC | Age: 51
End: 2021-01-18

## 2021-01-18 ENCOUNTER — COMMUNICATION - HEALTHEAST (OUTPATIENT)
Dept: NURSING | Facility: CLINIC | Age: 51
End: 2021-01-18

## 2021-01-18 ENCOUNTER — OFFICE VISIT - HEALTHEAST (OUTPATIENT)
Dept: FAMILY MEDICINE | Facility: CLINIC | Age: 51
End: 2021-01-18

## 2021-01-18 ENCOUNTER — AMBULATORY - HEALTHEAST (OUTPATIENT)
Dept: CARE COORDINATION | Facility: CLINIC | Age: 51
End: 2021-01-18

## 2021-01-18 DIAGNOSIS — Z95.5 S/P CORONARY ARTERY STENT PLACEMENT: ICD-10-CM

## 2021-01-18 DIAGNOSIS — I63.50 RIGHT PONTINE STROKE (H): ICD-10-CM

## 2021-01-18 DIAGNOSIS — E11.9 TYPE 2 DIABETES MELLITUS TREATED WITHOUT INSULIN (H): ICD-10-CM

## 2021-01-20 ENCOUNTER — OFFICE VISIT - HEALTHEAST (OUTPATIENT)
Dept: PHARMACY | Facility: CLINIC | Age: 51
End: 2021-01-20

## 2021-01-20 DIAGNOSIS — J45.40 MODERATE PERSISTENT ASTHMA, UNSPECIFIED WHETHER COMPLICATED: ICD-10-CM

## 2021-01-20 DIAGNOSIS — R07.9 NONSPECIFIC CHEST PAIN: ICD-10-CM

## 2021-01-20 DIAGNOSIS — I10 ESSENTIAL HYPERTENSION: ICD-10-CM

## 2021-01-20 DIAGNOSIS — K21.9 GASTROESOPHAGEAL REFLUX DISEASE WITHOUT ESOPHAGITIS: ICD-10-CM

## 2021-01-20 DIAGNOSIS — E11.9 TYPE 2 DIABETES MELLITUS TREATED WITHOUT INSULIN (H): ICD-10-CM

## 2021-01-20 DIAGNOSIS — I63.50 RIGHT PONTINE STROKE (H): ICD-10-CM

## 2021-01-20 PROCEDURE — 99605 MTMS BY PHARM NP 15 MIN: CPT | Performed by: PHARMACIST

## 2021-01-20 PROCEDURE — 99607 MTMS BY PHARM ADDL 15 MIN: CPT | Performed by: PHARMACIST

## 2021-01-25 ENCOUNTER — AMBULATORY - HEALTHEAST (OUTPATIENT)
Dept: PULMONOLOGY | Facility: OTHER | Age: 51
End: 2021-01-25

## 2021-01-25 DIAGNOSIS — J45.909 ASTHMA WITHOUT STATUS ASTHMATICUS: ICD-10-CM

## 2021-01-26 ENCOUNTER — AMBULATORY - HEALTHEAST (OUTPATIENT)
Dept: PULMONOLOGY | Facility: OTHER | Age: 51
End: 2021-01-26

## 2021-01-26 DIAGNOSIS — J18.9 PNEUMONIA OF LEFT LOWER LOBE DUE TO INFECTIOUS ORGANISM: ICD-10-CM

## 2021-01-27 ENCOUNTER — AMBULATORY - HEALTHEAST (OUTPATIENT)
Dept: PULMONOLOGY | Facility: OTHER | Age: 51
End: 2021-01-27

## 2021-01-31 ENCOUNTER — COMMUNICATION - HEALTHEAST (OUTPATIENT)
Dept: FAMILY MEDICINE | Facility: CLINIC | Age: 51
End: 2021-01-31

## 2021-01-31 DIAGNOSIS — D50.9 MICROCYTIC ANEMIA: ICD-10-CM

## 2021-02-08 ENCOUNTER — COMMUNICATION - HEALTHEAST (OUTPATIENT)
Dept: CARDIOLOGY | Facility: CLINIC | Age: 51
End: 2021-02-08

## 2021-02-08 PROBLEM — F32.1 MODERATE MAJOR DEPRESSION (H): Status: ACTIVE | Noted: 2020-12-11

## 2021-02-08 PROBLEM — D50.9 MICROCYTIC ANEMIA: Status: ACTIVE | Noted: 2019-11-17

## 2021-02-08 PROBLEM — E78.5 HYPERLIPIDEMIA: Status: ACTIVE | Noted: 2018-11-07

## 2021-02-08 PROBLEM — I25.10 CORONARY ARTERY DISEASE DUE TO LIPID RICH PLAQUE: Status: ACTIVE | Noted: 2018-01-25

## 2021-02-08 PROBLEM — R42 DIZZINESS: Status: ACTIVE | Noted: 2021-02-08

## 2021-02-08 PROBLEM — J44.9 CHRONIC OBSTRUCTIVE PULMONARY DISEASE, UNSPECIFIED COPD TYPE (H): Status: ACTIVE | Noted: 2021-02-08

## 2021-02-08 PROBLEM — Z22.7 LATENT TUBERCULOSIS: Status: ACTIVE | Noted: 2019-11-17

## 2021-02-08 PROBLEM — I25.83 CORONARY ARTERY DISEASE DUE TO LIPID RICH PLAQUE: Status: ACTIVE | Noted: 2018-01-25

## 2021-02-08 PROBLEM — G43.909 MIGRAINE HEADACHE: Status: ACTIVE | Noted: 2018-03-08

## 2021-02-08 PROBLEM — E11.9 TYPE 2 DIABETES MELLITUS TREATED WITHOUT INSULIN (H): Status: ACTIVE | Noted: 2021-02-08

## 2021-02-08 PROBLEM — J18.9 PNEUMONIA: Status: ACTIVE | Noted: 2020-08-19

## 2021-02-08 PROBLEM — I10 ESSENTIAL HYPERTENSION: Status: ACTIVE | Noted: 2017-04-14

## 2021-02-08 PROBLEM — I63.50 CEREBROVASCULAR ACCIDENT OF RIGHT PONTINE STRUCTURE (H): Status: ACTIVE | Noted: 2021-01-13

## 2021-02-08 PROBLEM — R51.9 CHRONIC NONINTRACTABLE HEADACHE, UNSPECIFIED HEADACHE TYPE: Status: ACTIVE | Noted: 2020-06-24

## 2021-02-08 PROBLEM — R06.02 SOB (SHORTNESS OF BREATH): Status: ACTIVE | Noted: 2019-11-16

## 2021-02-08 PROBLEM — J45.40 MODERATE PERSISTENT ASTHMA: Status: ACTIVE | Noted: 2018-03-08

## 2021-02-08 PROBLEM — F41.9 ANXIETY: Status: ACTIVE | Noted: 2021-02-08

## 2021-02-08 PROBLEM — G89.29 CHRONIC NONINTRACTABLE HEADACHE, UNSPECIFIED HEADACHE TYPE: Status: ACTIVE | Noted: 2020-06-24

## 2021-02-09 ENCOUNTER — OFFICE VISIT - HEALTHEAST (OUTPATIENT)
Dept: CARDIOLOGY | Facility: CLINIC | Age: 51
End: 2021-02-09

## 2021-02-09 DIAGNOSIS — I31.9 PERICARDITIS: ICD-10-CM

## 2021-02-09 DIAGNOSIS — I47.10 SVT (SUPRAVENTRICULAR TACHYCARDIA) (H): ICD-10-CM

## 2021-02-09 DIAGNOSIS — I25.10 CORONARY ARTERY DISEASE INVOLVING NATIVE CORONARY ARTERY OF NATIVE HEART WITHOUT ANGINA PECTORIS: ICD-10-CM

## 2021-02-09 DIAGNOSIS — I10 HYPERTENSION, UNSPECIFIED TYPE: ICD-10-CM

## 2021-02-09 DIAGNOSIS — E78.5 DYSLIPIDEMIA: ICD-10-CM

## 2021-02-09 ASSESSMENT — MIFFLIN-ST. JEOR: SCORE: 1120.25

## 2021-02-10 ENCOUNTER — COMMUNICATION - HEALTHEAST (OUTPATIENT)
Dept: PULMONOLOGY | Facility: OTHER | Age: 51
End: 2021-02-10

## 2021-02-10 DIAGNOSIS — J45.40 MODERATE PERSISTENT ASTHMA: ICD-10-CM

## 2021-02-11 ENCOUNTER — COMMUNICATION - HEALTHEAST (OUTPATIENT)
Dept: SCHEDULING | Facility: CLINIC | Age: 51
End: 2021-02-11

## 2021-02-12 ENCOUNTER — COMMUNICATION - HEALTHEAST (OUTPATIENT)
Dept: NURSING | Facility: CLINIC | Age: 51
End: 2021-02-12

## 2021-02-12 ENCOUNTER — AMBULATORY - HEALTHEAST (OUTPATIENT)
Dept: CARE COORDINATION | Facility: CLINIC | Age: 51
End: 2021-02-12

## 2021-02-12 ENCOUNTER — OFFICE VISIT - HEALTHEAST (OUTPATIENT)
Dept: FAMILY MEDICINE | Facility: CLINIC | Age: 51
End: 2021-02-12

## 2021-02-12 DIAGNOSIS — R42 DIZZINESS: ICD-10-CM

## 2021-02-12 DIAGNOSIS — I30.9 ACUTE PERICARDITIS, UNSPECIFIED TYPE: ICD-10-CM

## 2021-02-12 DIAGNOSIS — I63.50 RIGHT PONTINE STROKE (H): ICD-10-CM

## 2021-02-12 DIAGNOSIS — E11.9 TYPE 2 DIABETES MELLITUS TREATED WITHOUT INSULIN (H): ICD-10-CM

## 2021-02-12 DIAGNOSIS — K21.9 GERD (GASTROESOPHAGEAL REFLUX DISEASE): ICD-10-CM

## 2021-02-18 PROBLEM — R07.9 NONSPECIFIC CHEST PAIN: Status: ACTIVE | Noted: 2018-12-07

## 2021-02-19 ENCOUNTER — OFFICE VISIT (OUTPATIENT)
Dept: NEUROLOGY | Facility: CLINIC | Age: 51
End: 2021-02-19
Payer: COMMERCIAL

## 2021-02-19 ENCOUNTER — HOSPITAL ENCOUNTER (OUTPATIENT)
Dept: CT IMAGING | Facility: HOSPITAL | Age: 51
Discharge: HOME | End: 2021-02-19
Attending: INTERNAL MEDICINE

## 2021-02-19 VITALS
SYSTOLIC BLOOD PRESSURE: 145 MMHG | HEART RATE: 79 BPM | WEIGHT: 132 LBS | BODY MASS INDEX: 22.53 KG/M2 | HEIGHT: 64 IN | DIASTOLIC BLOOD PRESSURE: 90 MMHG

## 2021-02-19 DIAGNOSIS — I63.50 CEREBROVASCULAR ACCIDENT OF RIGHT PONTINE STRUCTURE (H): Primary | ICD-10-CM

## 2021-02-19 DIAGNOSIS — J18.9 PNEUMONIA OF LEFT LOWER LOBE DUE TO INFECTIOUS ORGANISM: ICD-10-CM

## 2021-02-19 PROCEDURE — 99214 OFFICE O/P EST MOD 30 MIN: CPT | Performed by: PSYCHIATRY & NEUROLOGY

## 2021-02-19 RX ORDER — INDOMETHACIN 25 MG/1
CAPSULE ORAL
COMMUNITY
Start: 2020-10-22 | End: 2021-08-11

## 2021-02-19 RX ORDER — CLOPIDOGREL BISULFATE 75 MG/1
75 TABLET ORAL DAILY
Qty: 90 TABLET | Refills: 3 | Status: SHIPPED | OUTPATIENT
Start: 2021-02-19 | End: 2022-02-17

## 2021-02-19 RX ORDER — ONDANSETRON 4 MG/1
TABLET, ORALLY DISINTEGRATING ORAL
COMMUNITY
Start: 2020-10-14 | End: 2021-08-11

## 2021-02-19 RX ORDER — MECLIZINE HYDROCHLORIDE 25 MG/1
25 TABLET ORAL DAILY PRN
COMMUNITY
Start: 2021-02-11 | End: 2022-09-06

## 2021-02-19 RX ORDER — ASPIRIN 81 MG/1
81 TABLET, COATED ORAL DAILY
COMMUNITY
Start: 2021-01-03 | End: 2022-03-07

## 2021-02-19 RX ORDER — SUCRALFATE 1 G/1
1 TABLET ORAL
COMMUNITY
Start: 2021-02-12 | End: 2022-01-10

## 2021-02-19 RX ORDER — ATORVASTATIN CALCIUM 80 MG/1
TABLET, FILM COATED ORAL
COMMUNITY
Start: 2020-12-29 | End: 2022-04-07

## 2021-02-19 RX ORDER — COLCHICINE 0.6 MG/1
TABLET ORAL
COMMUNITY
Start: 2021-02-12 | End: 2022-04-13

## 2021-02-19 RX ORDER — CELECOXIB 200 MG/1
CAPSULE ORAL
COMMUNITY
Start: 2020-06-29 | End: 2021-08-11

## 2021-02-19 RX ORDER — PREDNISONE 10 MG/1
TABLET ORAL
COMMUNITY
Start: 2021-02-10 | End: 2021-08-11

## 2021-02-19 RX ORDER — FERROUS SULFATE 325(65) MG
325 TABLET ORAL
COMMUNITY
Start: 2021-01-05 | End: 2022-02-17

## 2021-02-19 RX ORDER — METOPROLOL TARTRATE 25 MG/1
TABLET, FILM COATED ORAL
COMMUNITY
Start: 2020-12-29 | End: 2022-04-07

## 2021-02-19 RX ORDER — CLOPIDOGREL BISULFATE 75 MG/1
75 TABLET ORAL
COMMUNITY
Start: 2021-01-18 | End: 2021-02-19

## 2021-02-19 RX ORDER — GABAPENTIN 300 MG/1
CAPSULE ORAL
COMMUNITY
Start: 2020-12-07 | End: 2021-07-29

## 2021-02-19 ASSESSMENT — MIFFLIN-ST. JEOR: SCORE: 1188.75

## 2021-02-19 NOTE — PROGRESS NOTES
[facility]  [place]    Kulwant Amin MRN# [id number]   Age: 51 year old YOB: 1970               Assessment and Plan:   Assessment:   Recent pontine stroke on the right  Ongoing balance difficulties  Diabetes        Plan:     I did renew the Plavix prescription for the coming year, I reiterated that she should continue on dual antiplatelet therapy with aspirin and Plavix indefinitely since the stroke occurred when she was already taking full dose aspirin daily.  If her primary care physician is not able to renew the Plavix a year from now, she will need to come for routine follow-up visit for that.  Otherwise we will see her as needed.             Chief Complaint/HPI:     I saw this patient for follow-up today here in our [place] clinic accompanied by her daughter and an .  I met her about a month ago at [facility] when she presented with dizziness and balance difficulty.  She was found to have a small right pontine stroke, Plavix was added to her aspirin.  Stroke work-up reviewed, no A. fib, LDL was 52, echocardiogram was fine.  She is doing better in terms of balance and walking, however she went to the emergency room last week with more dizziness and weakness.  Repeat MRI showed no new stroke.            Past Medical History:    has a past medical history of TB lung, latent.          Past Surgical History:    has no past surgical history on file.          Social History:     Social History     Tobacco Use     Smoking status: Former Smoker     Packs/day: 1.00     Years: 30.00     Pack years: 30.00     Types: Cigarettes     Smokeless tobacco: Never Used   Substance Use Topics     Alcohol use: No             Family History:     Family History   Problem Relation Age of Onset     Other - See Comments Mother         Passed away from intestinal problem     Ulcers Father                 Allergies:   No Known Allergies          Medications:     Current Outpatient  Medications:      acetaminophen (TYLENOL) 500 MG tablet, Take 2 tablets (1,000 mg) by mouth every 8 hours, Disp: 100 tablet, Rfl: 0     albuterol (PROAIR HFA, PROVENTIL HFA, VENTOLIN HFA) 108 (90 BASE) MCG/ACT inhaler, Inhale 2 puffs into the lungs every 4 hours as needed for shortness of breath / dyspnea, Disp: 1 Inhaler, Rfl: 4     ASPIRIN LOW DOSE 81 MG EC tablet, , Disp: , Rfl:      atorvastatin (LIPITOR) 80 MG tablet, TAKE 1 TABLET (80 MG TOTAL) BY MOUTH AT BEDTIME FOR CHOLESTEROL, Disp: , Rfl:      benzonatate (TESSALON) 100 MG capsule, Take 1 capsule (100 mg) by mouth 3 times daily as needed for cough, Disp: 42 capsule, Rfl: 1     celecoxib (CELEBREX) 200 MG capsule, , Disp: , Rfl:      clopidogrel (PLAVIX) 75 MG tablet, Take 1 tablet (75 mg) by mouth daily, Disp: 90 tablet, Rfl: 3     colchicine (COLCYRS) 0.6 MG tablet, TAKE 1 TABLET (0.6 MG TOTAL) BY MOUTH DAILY, Disp: , Rfl:      ferrous sulfate (FEROSUL) 325 (65 Fe) MG tablet, Take 325 mg by mouth, Disp: , Rfl:      fluticasone-vilanterol (BREO ELLIPTA) 200-25 MCG/INH inhaler, Inhale 1 puff into the lungs, Disp: , Rfl:      gabapentin (NEURONTIN) 300 MG capsule, TAKE 1 CAPSULE (300 MG TOTAL) BY MOUTH 2 (TWO) TIMES A DAY FOR NERVE PAIN, Disp: , Rfl:      hydrochlorothiazide (MICROZIDE) 12.5 MG capsule, Take 1 capsule (12.5 mg) by mouth daily, Disp: 30 capsule, Rfl: 6     indomethacin (INDOCIN) 25 MG capsule, , Disp: , Rfl:      insulin aspart (NOVOLOG VIAL) 100 UNITS/ML vial, , Disp: , Rfl:      ipratropium - albuterol 0.5 mg/2.5 mg/3 mL (DUONEB) 0.5-2.5 (3) MG/3ML nebulization, Take 1 vial (3 mLs) by nebulization every 6 hours as needed for shortness of breath / dyspnea or wheezing, Disp: 90 vial, Rfl: 1     meclizine (ANTIVERT) 25 MG tablet, Take 25 mg by mouth, Disp: , Rfl:      metFORMIN (GLUCOPHAGE) 850 MG tablet, Take 850 mg by mouth, Disp: , Rfl:      metoprolol tartrate (LOPRESSOR) 25 MG tablet, TAKE 1 TABLET (25 MG TOTAL) BY MOUTH 2 (TWO) TIMES A  DAY FOR BLOOD PRESSURE, Disp: , Rfl:      mometasone-formoterol (DULERA) 200-5 MCG/ACT oral inhaler, Inhale 2 puffs into the lungs 2 times daily, Disp: 1 Inhaler, Rfl: 5     montelukast (SINGULAIR) 10 MG tablet, Take 1 tablet (10 mg) by mouth At Bedtime, Disp: 30 tablet, Rfl: 6     naproxen (NAPROSYN) 375 MG tablet, Take 1 tablet (375 mg) by mouth 2 times daily (with meals), Disp: 30 tablet, Rfl: 0     naproxen (NAPROSYN) 500 MG tablet, Take 1 tablet (500 mg) by mouth 2 times daily as needed for moderate pain, Disp: 30 tablet, Rfl: 1     omeprazole (PRILOSEC) 20 MG capsule, Take 1 capsule (20 mg) by mouth daily, Disp: 30 capsule, Rfl: 12     ondansetron (ZOFRAN-ODT) 4 MG ODT tab, , Disp: , Rfl:      polyethylene glycol (MIRALAX) powder, Take 17 g by mouth daily, Disp: 510 g, Rfl: 1     predniSONE (DELTASONE) 10 MG tablet, , Disp: , Rfl:      sucralfate (CARAFATE) 1 GM tablet, Take 1 g by mouth, Disp: , Rfl:      tiotropium (SPIRIVA RESPIMAT) 2.5 MCG/ACT inhalation aerosol, Inhale 2 puffs into the lungs daily, Disp: 4 g, Rfl: 1     azithromycin (ZITHROMAX) 250 MG tablet, Two tablets first day, then one tablet daily for four days. (Patient not taking: Reported on 2/19/2021), Disp: 6 tablet, Rfl: 0     calcium carbonate-vitamin D 500-400 MG-UNIT TABS tablt, Take 1 tablet by mouth 2 times daily (Patient not taking: Reported on 2/19/2021), Disp: 180 tablet, Rfl: 3     guaiFENesin-codeine (ROBITUSSIN AC) 100-10 MG/5ML SOLN, Take 10 mLs by mouth every 6 hours as needed for cough (Patient not taking: Reported on 2/19/2021), Disp: 120 mL, Rfl: 0     loratadine (CLARITIN) 10 MG tablet, Take 1 tablet (10 mg) by mouth daily (Patient not taking: Reported on 2/19/2021), Disp: 90 tablet, Rfl: 3     ORDER FOR DME, Equipment being ordered: bed side commode, transfer bench for bath tub and hospital bed, Disp: 1 Device, Rfl: 0     ORDER FOR DME, Equipment being ordered: Flutter valve ( Acapella), Disp: 1 Units, Rfl: 0     ORDER FOR  "DME, Equipment being ordered: Nebulizer with associated supplies, Disp: 1 Device, Rfl: 0     ORDER FOR DME, Equipment being ordered: Incentive spirometer, Disp: 1 Units, Rfl: 0     polyvinyl alcohol (ARTIFICIAL TEARS) 1.4 % ophthalmic solution, Place 1 drop into both eyes as needed for dry eyes (Patient not taking: Reported on 2/19/2021), Disp: 15 mL, Rfl: 3              Physical Exam:     BP (!) 145/90 (BP Location: Right arm, Patient Position: Sitting)   Pulse 79   Ht 1.626 m (5' 4\")   Wt 59.9 kg (132 lb)   BMI 22.66 kg/m     Awake, alert, no aphasia, no dysarthria as she speaks with her daughter and the   attention is fine  Cranial nerves II - XII tested and intact, no nystagmus  There is very mild weakness on the left, leg worse than arm  Toetapping is a little diminished on the left  Reflexes are slightly brisk on the left compared to the right  Gait is slow, she does okay with the cane.       I pulled up the MRI images from her recent hospitalization here in the exam room to show her and her daughter.      Saqib Multani MD         "

## 2021-02-19 NOTE — NURSING NOTE
Chief Complaint   Patient presents with     Hospital F/U     Follow up after CVA     Urvashi Saha LPN on 2/19/2021 at 9:09 AM

## 2021-02-19 NOTE — LETTER
2/19/2021         RE: Kulwant Amin  [address] [id number]        Dear Colleague,    Thank you for referring your patient, Kulwant Amin, to the [facility] [place]. Please see a copy of my visit note below.    [facility]  [place]    Kulwant Amin MRN# [id number]   Age: 51 year old YOB: 1970               Assessment and Plan:   Assessment:   Recent pontine stroke on the right  Ongoing balance difficulties  Diabetes        Plan:     I did renew the Plavix prescription for the coming year, I reiterated that she should continue on dual antiplatelet therapy with aspirin and Plavix indefinitely since the stroke occurred when she was already taking full dose aspirin daily.  If her primary care physician is not able to renew the Plavix a year from now, she will need to come for routine follow-up visit for that.  Otherwise we will see her as needed.             Chief Complaint/HPI:     I saw this patient for follow-up today here in our [place] clinic accompanied by her daughter and an .  I met her about a month ago at [facility] when she presented with dizziness and balance difficulty.  She was found to have a small right pontine stroke, Plavix was added to her aspirin.  Stroke work-up reviewed, no A. fib, LDL was 52, echocardiogram was fine.  She is doing better in terms of balance and walking, however she went to the emergency room last week with more dizziness and weakness.  Repeat MRI showed no new stroke.            Past Medical History:    has a past medical history of TB lung, latent.          Past Surgical History:    has no past surgical history on file.          Social History:     Social History     Tobacco Use     Smoking status: Former Smoker     Packs/day: 1.00     Years: 30.00     Pack years: 30.00     Types: Cigarettes     Smokeless tobacco: Never Used   Substance Use Topics     Alcohol use: No              Family History:     Family History   Problem Relation Age of Onset     Other - See Comments Mother         Passed away from intestinal problem     Ulcers Father                 Allergies:   No Known Allergies          Medications:     Current Outpatient Medications:      acetaminophen (TYLENOL) 500 MG tablet, Take 2 tablets (1,000 mg) by mouth every 8 hours, Disp: 100 tablet, Rfl: 0     albuterol (PROAIR HFA, PROVENTIL HFA, VENTOLIN HFA) 108 (90 BASE) MCG/ACT inhaler, Inhale 2 puffs into the lungs every 4 hours as needed for shortness of breath / dyspnea, Disp: 1 Inhaler, Rfl: 4     ASPIRIN LOW DOSE 81 MG EC tablet, , Disp: , Rfl:      atorvastatin (LIPITOR) 80 MG tablet, TAKE 1 TABLET (80 MG TOTAL) BY MOUTH AT BEDTIME FOR CHOLESTEROL, Disp: , Rfl:      benzonatate (TESSALON) 100 MG capsule, Take 1 capsule (100 mg) by mouth 3 times daily as needed for cough, Disp: 42 capsule, Rfl: 1     celecoxib (CELEBREX) 200 MG capsule, , Disp: , Rfl:      clopidogrel (PLAVIX) 75 MG tablet, Take 1 tablet (75 mg) by mouth daily, Disp: 90 tablet, Rfl: 3     colchicine (COLCYRS) 0.6 MG tablet, TAKE 1 TABLET (0.6 MG TOTAL) BY MOUTH DAILY, Disp: , Rfl:      ferrous sulfate (FEROSUL) 325 (65 Fe) MG tablet, Take 325 mg by mouth, Disp: , Rfl:      fluticasone-vilanterol (BREO ELLIPTA) 200-25 MCG/INH inhaler, Inhale 1 puff into the lungs, Disp: , Rfl:      gabapentin (NEURONTIN) 300 MG capsule, TAKE 1 CAPSULE (300 MG TOTAL) BY MOUTH 2 (TWO) TIMES A DAY FOR NERVE PAIN, Disp: , Rfl:      hydrochlorothiazide (MICROZIDE) 12.5 MG capsule, Take 1 capsule (12.5 mg) by mouth daily, Disp: 30 capsule, Rfl: 6     indomethacin (INDOCIN) 25 MG capsule, , Disp: , Rfl:      insulin aspart (NOVOLOG VIAL) 100 UNITS/ML vial, , Disp: , Rfl:      ipratropium - albuterol 0.5 mg/2.5 mg/3 mL (DUONEB) 0.5-2.5 (3) MG/3ML nebulization, Take 1 vial (3 mLs) by nebulization every 6 hours as needed for shortness of breath / dyspnea or wheezing, Disp: 90 vial, Rfl:  1     meclizine (ANTIVERT) 25 MG tablet, Take 25 mg by mouth, Disp: , Rfl:      metFORMIN (GLUCOPHAGE) 850 MG tablet, Take 850 mg by mouth, Disp: , Rfl:      metoprolol tartrate (LOPRESSOR) 25 MG tablet, TAKE 1 TABLET (25 MG TOTAL) BY MOUTH 2 (TWO) TIMES A DAY FOR BLOOD PRESSURE, Disp: , Rfl:      mometasone-formoterol (DULERA) 200-5 MCG/ACT oral inhaler, Inhale 2 puffs into the lungs 2 times daily, Disp: 1 Inhaler, Rfl: 5     montelukast (SINGULAIR) 10 MG tablet, Take 1 tablet (10 mg) by mouth At Bedtime, Disp: 30 tablet, Rfl: 6     naproxen (NAPROSYN) 375 MG tablet, Take 1 tablet (375 mg) by mouth 2 times daily (with meals), Disp: 30 tablet, Rfl: 0     naproxen (NAPROSYN) 500 MG tablet, Take 1 tablet (500 mg) by mouth 2 times daily as needed for moderate pain, Disp: 30 tablet, Rfl: 1     omeprazole (PRILOSEC) 20 MG capsule, Take 1 capsule (20 mg) by mouth daily, Disp: 30 capsule, Rfl: 12     ondansetron (ZOFRAN-ODT) 4 MG ODT tab, , Disp: , Rfl:      polyethylene glycol (MIRALAX) powder, Take 17 g by mouth daily, Disp: 510 g, Rfl: 1     predniSONE (DELTASONE) 10 MG tablet, , Disp: , Rfl:      sucralfate (CARAFATE) 1 GM tablet, Take 1 g by mouth, Disp: , Rfl:      tiotropium (SPIRIVA RESPIMAT) 2.5 MCG/ACT inhalation aerosol, Inhale 2 puffs into the lungs daily, Disp: 4 g, Rfl: 1     azithromycin (ZITHROMAX) 250 MG tablet, Two tablets first day, then one tablet daily for four days. (Patient not taking: Reported on 2/19/2021), Disp: 6 tablet, Rfl: 0     calcium carbonate-vitamin D 500-400 MG-UNIT TABS tablt, Take 1 tablet by mouth 2 times daily (Patient not taking: Reported on 2/19/2021), Disp: 180 tablet, Rfl: 3     guaiFENesin-codeine (ROBITUSSIN AC) 100-10 MG/5ML SOLN, Take 10 mLs by mouth every 6 hours as needed for cough (Patient not taking: Reported on 2/19/2021), Disp: 120 mL, Rfl: 0     loratadine (CLARITIN) 10 MG tablet, Take 1 tablet (10 mg) by mouth daily (Patient not taking: Reported on 2/19/2021), Disp:  "90 tablet, Rfl: 3     ORDER FOR DME, Equipment being ordered: bed side commode, transfer bench for bath tub and hospital bed, Disp: 1 Device, Rfl: 0     ORDER FOR DME, Equipment being ordered: Flutter valve ( Acapella), Disp: 1 Units, Rfl: 0     ORDER FOR DME, Equipment being ordered: Nebulizer with associated supplies, Disp: 1 Device, Rfl: 0     ORDER FOR DME, Equipment being ordered: Incentive spirometer, Disp: 1 Units, Rfl: 0     polyvinyl alcohol (ARTIFICIAL TEARS) 1.4 % ophthalmic solution, Place 1 drop into both eyes as needed for dry eyes (Patient not taking: Reported on 2/19/2021), Disp: 15 mL, Rfl: 3              Physical Exam:     BP (!) 145/90 (BP Location: Right arm, Patient Position: Sitting)   Pulse 79   Ht 1.626 m (5' 4\")   Wt 59.9 kg (132 lb)   BMI 22.66 kg/m     Awake, alert, no aphasia, no dysarthria as she speaks with her daughter and the   attention is fine  Cranial nerves II - XII tested and intact, no nystagmus  There is very mild weakness on the left, leg worse than arm  Toetapping is a little diminished on the left  Reflexes are slightly brisk on the left compared to the right  Gait is slow, she does okay with the cane.       I pulled up the MRI images from her recent hospitalization here in the exam room to show her and her daughter.      Saqib Multani MD             Again, thank you for allowing me to participate in the care of your patient.        Sincerely,        Saqib Multani MD    "

## 2021-02-25 ENCOUNTER — OFFICE VISIT - HEALTHEAST (OUTPATIENT)
Dept: PULMONOLOGY | Facility: OTHER | Age: 51
End: 2021-02-25

## 2021-02-25 DIAGNOSIS — J45.41 MODERATE PERSISTENT ASTHMA WITH EXACERBATION: ICD-10-CM

## 2021-02-25 LAB
BASOPHILS # BLD AUTO: 0.1 THOU/UL (ref 0–0.2)
BASOPHILS NFR BLD AUTO: 1 % (ref 0–2)
EOSINOPHIL # BLD AUTO: 0.8 THOU/UL (ref 0–0.4)
EOSINOPHIL NFR BLD AUTO: 14 % (ref 0–6)
ERYTHROCYTE [DISTWIDTH] IN BLOOD BY AUTOMATED COUNT: 13.6 % (ref 11–14.5)
HCT VFR BLD AUTO: 39.3 % (ref 35–47)
HGB BLD-MCNC: 12.4 G/DL (ref 12–16)
IMM GRANULOCYTES # BLD: 0 THOU/UL
IMM GRANULOCYTES NFR BLD: 0 %
LYMPHOCYTES # BLD AUTO: 2.1 THOU/UL (ref 0.8–4.4)
LYMPHOCYTES NFR BLD AUTO: 34 % (ref 20–40)
MCH RBC QN AUTO: 28.2 PG (ref 27–34)
MCHC RBC AUTO-ENTMCNC: 31.6 G/DL (ref 32–36)
MCV RBC AUTO: 90 FL (ref 80–100)
MONOCYTES # BLD AUTO: 0.6 THOU/UL (ref 0–0.9)
MONOCYTES NFR BLD AUTO: 10 % (ref 2–10)
NEUTROPHILS # BLD AUTO: 2.4 THOU/UL (ref 2–7.7)
NEUTROPHILS NFR BLD AUTO: 41 % (ref 50–70)
PLATELET # BLD AUTO: 157 THOU/UL (ref 140–440)
PMV BLD AUTO: 15 FL (ref 8.5–12.5)
RBC # BLD AUTO: 4.39 MILL/UL (ref 3.8–5.4)
WBC: 6 THOU/UL (ref 4–11)

## 2021-03-05 ENCOUNTER — COMMUNICATION - HEALTHEAST (OUTPATIENT)
Dept: CARDIOLOGY | Facility: CLINIC | Age: 51
End: 2021-03-05

## 2021-03-05 ENCOUNTER — OFFICE VISIT - HEALTHEAST (OUTPATIENT)
Dept: FAMILY MEDICINE | Facility: CLINIC | Age: 51
End: 2021-03-05

## 2021-03-05 ENCOUNTER — COMMUNICATION - HEALTHEAST (OUTPATIENT)
Dept: PULMONOLOGY | Facility: OTHER | Age: 51
End: 2021-03-05

## 2021-03-05 DIAGNOSIS — R42 DIZZINESS: ICD-10-CM

## 2021-03-05 DIAGNOSIS — E11.9 TYPE 2 DIABETES MELLITUS TREATED WITHOUT INSULIN (H): ICD-10-CM

## 2021-03-05 DIAGNOSIS — J45.40 MODERATE PERSISTENT ASTHMA WITHOUT COMPLICATION: ICD-10-CM

## 2021-03-05 DIAGNOSIS — I63.50 RIGHT PONTINE STROKE (H): ICD-10-CM

## 2021-03-05 DIAGNOSIS — G43.809 OTHER MIGRAINE WITHOUT STATUS MIGRAINOSUS, NOT INTRACTABLE: ICD-10-CM

## 2021-03-05 DIAGNOSIS — Z95.5 S/P CORONARY ARTERY STENT PLACEMENT: ICD-10-CM

## 2021-03-05 DIAGNOSIS — I30.9 ACUTE PERICARDITIS, UNSPECIFIED TYPE: ICD-10-CM

## 2021-03-05 DIAGNOSIS — D64.9 ANEMIA, UNSPECIFIED TYPE: ICD-10-CM

## 2021-03-05 LAB — HGB BLD-MCNC: 14.6 G/DL (ref 12–16)

## 2021-03-05 ASSESSMENT — MIFFLIN-ST. JEOR: SCORE: 1160.51

## 2021-03-11 ENCOUNTER — OFFICE VISIT - HEALTHEAST (OUTPATIENT)
Dept: PULMONOLOGY | Facility: OTHER | Age: 51
End: 2021-03-11

## 2021-03-11 DIAGNOSIS — J45.51 SEVERE PERSISTENT ASTHMA WITH EXACERBATION (H): ICD-10-CM

## 2021-03-11 DIAGNOSIS — B37.0 THRUSH: ICD-10-CM

## 2021-03-11 ASSESSMENT — MIFFLIN-ST. JEOR: SCORE: 1117.98

## 2021-03-12 ENCOUNTER — AMBULATORY - HEALTHEAST (OUTPATIENT)
Dept: PHARMACY | Facility: CLINIC | Age: 51
End: 2021-03-12

## 2021-03-12 DIAGNOSIS — Z95.5 S/P CORONARY ARTERY STENT PLACEMENT: ICD-10-CM

## 2021-03-12 DIAGNOSIS — J45.40 MODERATE PERSISTENT ASTHMA, UNSPECIFIED WHETHER COMPLICATED: ICD-10-CM

## 2021-03-12 DIAGNOSIS — I10 ESSENTIAL HYPERTENSION: ICD-10-CM

## 2021-03-12 DIAGNOSIS — E11.9 TYPE 2 DIABETES MELLITUS TREATED WITHOUT INSULIN (H): ICD-10-CM

## 2021-03-12 DIAGNOSIS — K21.9 GASTROESOPHAGEAL REFLUX DISEASE WITHOUT ESOPHAGITIS: ICD-10-CM

## 2021-03-12 PROCEDURE — 99607 MTMS BY PHARM ADDL 15 MIN: CPT | Performed by: PHARMACIST

## 2021-03-12 PROCEDURE — 99606 MTMS BY PHARM EST 15 MIN: CPT | Performed by: PHARMACIST

## 2021-03-13 ENCOUNTER — COMMUNICATION - HEALTHEAST (OUTPATIENT)
Dept: FAMILY MEDICINE | Facility: CLINIC | Age: 51
End: 2021-03-13

## 2021-03-13 DIAGNOSIS — K64.9 HEMORRHOIDS, UNSPECIFIED HEMORRHOID TYPE: ICD-10-CM

## 2021-03-13 DIAGNOSIS — E11.9 TYPE 2 DIABETES MELLITUS TREATED WITHOUT INSULIN (H): ICD-10-CM

## 2021-03-16 ENCOUNTER — AMBULATORY - HEALTHEAST (OUTPATIENT)
Dept: FAMILY MEDICINE | Facility: CLINIC | Age: 51
End: 2021-03-16

## 2021-03-16 DIAGNOSIS — K64.9 HEMORRHOIDS, UNSPECIFIED HEMORRHOID TYPE: ICD-10-CM

## 2021-03-16 DIAGNOSIS — E11.9 TYPE 2 DIABETES MELLITUS TREATED WITHOUT INSULIN (H): ICD-10-CM

## 2021-03-18 ENCOUNTER — OFFICE VISIT - HEALTHEAST (OUTPATIENT)
Dept: ALLERGY | Facility: CLINIC | Age: 51
End: 2021-03-18

## 2021-03-18 DIAGNOSIS — J45.40 MODERATE PERSISTENT ASTHMA WITHOUT COMPLICATION: ICD-10-CM

## 2021-03-18 DIAGNOSIS — D72.19 OTHER EOSINOPHILIA: ICD-10-CM

## 2021-03-18 ASSESSMENT — MIFFLIN-ST. JEOR: SCORE: 1117.98

## 2021-03-19 LAB
A ALTERNATA IGE QN: <0.35 KU/L
A FUMIGATUS IGE QN: <0.35 KU/L
BERMUDA GRASS IGE QN: <0.35 KU/L
C HERBARUM IGE QN: <0.35 KU/L
CAT DANDER IGG QN: <0.35 KU/L
CEDAR IGE QN: <0.35 KU/L
COMMON RAGWEED IGE QN: <0.35 KU/L
COTTONWOOD IGE QN: <0.35 KU/L
D FARINAE IGE QN: <0.35 KU/L
D PTERONYSS IGE QN: <0.35 KU/L
DOG DANDER+EPITH IGE QN: <0.35 KU/L
MAPLE IGE QN: <0.35 KU/L
MARSH ELDER IGE QN: <0.35 KU/L
MOUSE URINE PROT IGE QN: <0.35 KU/L
NETTLE IGE QN: <0.35 KU/L
P NOTATUM IGE QN: <0.35 KU/L
ROACH IGE QN: <0.35 KU/L
SALTWORT IGE QN: <0.35 KU/L
SILVER BIRCH IGE QN: <0.35 KU/L
TIMOTHY IGE QN: <0.35 KU/L
TOTAL IGE - HISTORICAL: 90.1 KU/L (ref 0–100)
WHITE ASH IGE QN: <0.35 KU/L
WHITE ELM IGE QN: <0.35 KU/L
WHITE MULBERRY IGE QN: <0.35 KU/L
WHITE OAK IGE QN: <0.35 KU/L

## 2021-03-20 LAB — STRONGYLOIDES IGG SER IA-ACNC: 0.2 IV

## 2021-03-22 ENCOUNTER — OFFICE VISIT - HEALTHEAST (OUTPATIENT)
Dept: FAMILY MEDICINE | Facility: CLINIC | Age: 51
End: 2021-03-22

## 2021-03-22 ENCOUNTER — COMMUNICATION - HEALTHEAST (OUTPATIENT)
Dept: ALLERGY | Facility: CLINIC | Age: 51
End: 2021-03-22

## 2021-03-22 DIAGNOSIS — S05.01XA ABRASION OF RIGHT CORNEA, INITIAL ENCOUNTER: ICD-10-CM

## 2021-03-22 DIAGNOSIS — R42 DIZZINESS: ICD-10-CM

## 2021-03-22 ASSESSMENT — MIFFLIN-ST. JEOR: SCORE: 1141.79

## 2021-03-23 ENCOUNTER — TRANSFERRED RECORDS (OUTPATIENT)
Dept: HEALTH INFORMATION MANAGEMENT | Facility: CLINIC | Age: 51
End: 2021-03-23
Payer: COMMERCIAL

## 2021-03-23 LAB — RETINOPATHY: NEGATIVE

## 2021-03-25 ENCOUNTER — AMBULATORY - HEALTHEAST (OUTPATIENT)
Dept: LAB | Facility: CLINIC | Age: 51
End: 2021-03-25

## 2021-03-25 DIAGNOSIS — D72.19 OTHER EOSINOPHILIA: ICD-10-CM

## 2021-03-26 ENCOUNTER — COMMUNICATION - HEALTHEAST (OUTPATIENT)
Dept: ALLERGY | Facility: CLINIC | Age: 51
End: 2021-03-26

## 2021-03-26 LAB — O+P STL MICRO: NORMAL

## 2021-03-29 ENCOUNTER — COMMUNICATION - HEALTHEAST (OUTPATIENT)
Dept: ALLERGY | Facility: CLINIC | Age: 51
End: 2021-03-29

## 2021-03-29 ENCOUNTER — AMBULATORY - HEALTHEAST (OUTPATIENT)
Dept: ALLERGY | Facility: CLINIC | Age: 51
End: 2021-03-29

## 2021-03-29 DIAGNOSIS — J45.50 SEVERE PERSISTENT ASTHMA WITHOUT COMPLICATION (H): ICD-10-CM

## 2021-03-30 ENCOUNTER — COMMUNICATION - HEALTHEAST (OUTPATIENT)
Dept: ALLERGY | Facility: CLINIC | Age: 51
End: 2021-03-30

## 2021-03-31 ENCOUNTER — COMMUNICATION - HEALTHEAST (OUTPATIENT)
Dept: ALLERGY | Facility: CLINIC | Age: 51
End: 2021-03-31

## 2021-04-01 ENCOUNTER — AMBULATORY - HEALTHEAST (OUTPATIENT)
Dept: ALLERGY | Facility: CLINIC | Age: 51
End: 2021-04-01

## 2021-04-01 DIAGNOSIS — J45.50 SEVERE PERSISTENT ASTHMA WITHOUT COMPLICATION (H): ICD-10-CM

## 2021-04-05 ENCOUNTER — COMMUNICATION - HEALTHEAST (OUTPATIENT)
Dept: ALLERGY | Facility: CLINIC | Age: 51
End: 2021-04-05

## 2021-04-08 ENCOUNTER — COMMUNICATION - HEALTHEAST (OUTPATIENT)
Dept: PULMONOLOGY | Facility: OTHER | Age: 51
End: 2021-04-08

## 2021-04-08 DIAGNOSIS — J45.40 MODERATE PERSISTENT ASTHMA: ICD-10-CM

## 2021-04-09 ENCOUNTER — COMMUNICATION - HEALTHEAST (OUTPATIENT)
Dept: ALLERGY | Facility: CLINIC | Age: 51
End: 2021-04-09

## 2021-04-15 ENCOUNTER — COMMUNICATION - HEALTHEAST (OUTPATIENT)
Dept: FAMILY MEDICINE | Facility: CLINIC | Age: 51
End: 2021-04-15

## 2021-04-15 ENCOUNTER — COMMUNICATION - HEALTHEAST (OUTPATIENT)
Dept: ALLERGY | Facility: CLINIC | Age: 51
End: 2021-04-15

## 2021-04-15 ENCOUNTER — AMBULATORY - HEALTHEAST (OUTPATIENT)
Dept: ALLERGY | Facility: CLINIC | Age: 51
End: 2021-04-15

## 2021-04-15 DIAGNOSIS — Z95.5 S/P CORONARY ARTERY STENT PLACEMENT: ICD-10-CM

## 2021-04-15 DIAGNOSIS — J45.50 SEVERE PERSISTENT ASTHMA (H): ICD-10-CM

## 2021-04-27 ENCOUNTER — COMMUNICATION - HEALTHEAST (OUTPATIENT)
Dept: PULMONOLOGY | Facility: OTHER | Age: 51
End: 2021-04-27

## 2021-04-27 DIAGNOSIS — J45.41 MODERATE PERSISTENT ASTHMA WITH EXACERBATION: ICD-10-CM

## 2021-04-29 ENCOUNTER — COMMUNICATION - HEALTHEAST (OUTPATIENT)
Dept: SCHEDULING | Facility: CLINIC | Age: 51
End: 2021-04-29

## 2021-04-29 ENCOUNTER — NURSE TRIAGE (OUTPATIENT)
Dept: NURSING | Facility: CLINIC | Age: 51
End: 2021-04-29

## 2021-05-06 ENCOUNTER — OFFICE VISIT - HEALTHEAST (OUTPATIENT)
Dept: FAMILY MEDICINE | Facility: CLINIC | Age: 51
End: 2021-05-06

## 2021-05-06 ENCOUNTER — OFFICE VISIT - HEALTHEAST (OUTPATIENT)
Dept: PULMONOLOGY | Facility: OTHER | Age: 51
End: 2021-05-06

## 2021-05-06 DIAGNOSIS — J45.51 SEVERE PERSISTENT ASTHMA WITH EXACERBATION (H): ICD-10-CM

## 2021-05-06 DIAGNOSIS — I10 ESSENTIAL HYPERTENSION: ICD-10-CM

## 2021-05-06 DIAGNOSIS — J44.9 CHRONIC OBSTRUCTIVE PULMONARY DISEASE, UNSPECIFIED COPD TYPE (H): ICD-10-CM

## 2021-05-06 DIAGNOSIS — I25.10 CORONARY ARTERY DISEASE DUE TO LIPID RICH PLAQUE: ICD-10-CM

## 2021-05-06 DIAGNOSIS — K21.9 GASTROESOPHAGEAL REFLUX DISEASE, UNSPECIFIED WHETHER ESOPHAGITIS PRESENT: ICD-10-CM

## 2021-05-06 DIAGNOSIS — G89.29 CHRONIC NONINTRACTABLE HEADACHE, UNSPECIFIED HEADACHE TYPE: ICD-10-CM

## 2021-05-06 DIAGNOSIS — I25.83 CORONARY ARTERY DISEASE DUE TO LIPID RICH PLAQUE: ICD-10-CM

## 2021-05-06 DIAGNOSIS — Z86.73 H/O: STROKE: ICD-10-CM

## 2021-05-06 DIAGNOSIS — E78.00 PURE HYPERCHOLESTEROLEMIA: ICD-10-CM

## 2021-05-06 DIAGNOSIS — R51.9 CHRONIC NONINTRACTABLE HEADACHE, UNSPECIFIED HEADACHE TYPE: ICD-10-CM

## 2021-05-06 DIAGNOSIS — F32.1 MODERATE MAJOR DEPRESSION (H): ICD-10-CM

## 2021-05-06 DIAGNOSIS — E11.9 TYPE 2 DIABETES MELLITUS TREATED WITHOUT INSULIN (H): ICD-10-CM

## 2021-05-06 LAB
ANION GAP SERPL CALCULATED.3IONS-SCNC: 12 MMOL/L (ref 5–18)
BUN SERPL-MCNC: 9 MG/DL (ref 8–22)
CALCIUM SERPL-MCNC: 8.5 MG/DL (ref 8.5–10.5)
CHLORIDE BLD-SCNC: 105 MMOL/L (ref 98–107)
CHOLEST SERPL-MCNC: 105 MG/DL
CO2 SERPL-SCNC: 23 MMOL/L (ref 22–31)
CREAT SERPL-MCNC: 0.62 MG/DL (ref 0.6–1.1)
CREAT UR-MCNC: 11.5 MG/DL
FASTING STATUS PATIENT QL REPORTED: NO
GFR SERPL CREATININE-BSD FRML MDRD: >60 ML/MIN/1.73M2
GLUCOSE BLD-MCNC: 76 MG/DL (ref 70–125)
HBA1C MFR BLD: 5.9 %
HDLC SERPL-MCNC: 41 MG/DL
HIV 1+2 AB+HIV1 P24 AG SERPL QL IA: NEGATIVE
LDLC SERPL CALC-MCNC: 43 MG/DL
MICROALBUMIN UR-MCNC: <0.5 MG/DL (ref 0–1.99)
MICROALBUMIN/CREAT UR: NORMAL MG/G{CREAT}
POTASSIUM BLD-SCNC: 4.4 MMOL/L (ref 3.5–5)
SODIUM SERPL-SCNC: 140 MMOL/L (ref 136–145)
TRIGL SERPL-MCNC: 103 MG/DL

## 2021-05-06 ASSESSMENT — MIFFLIN-ST. JEOR
SCORE: 1125.91
SCORE: 1125.4

## 2021-05-16 ENCOUNTER — COMMUNICATION - HEALTHEAST (OUTPATIENT)
Dept: FAMILY MEDICINE | Facility: CLINIC | Age: 51
End: 2021-05-16

## 2021-05-16 DIAGNOSIS — G89.29 CHRONIC NONINTRACTABLE HEADACHE, UNSPECIFIED HEADACHE TYPE: ICD-10-CM

## 2021-05-16 DIAGNOSIS — R51.9 CHRONIC NONINTRACTABLE HEADACHE, UNSPECIFIED HEADACHE TYPE: ICD-10-CM

## 2021-05-18 ENCOUNTER — COMMUNICATION - HEALTHEAST (OUTPATIENT)
Dept: FAMILY MEDICINE | Facility: CLINIC | Age: 51
End: 2021-05-18

## 2021-05-18 DIAGNOSIS — E11.9 DM TYPE 2 (DIABETES MELLITUS, TYPE 2) (H): ICD-10-CM

## 2021-05-26 ENCOUNTER — COMMUNICATION - HEALTHEAST (OUTPATIENT)
Dept: ALLERGY | Facility: CLINIC | Age: 51
End: 2021-05-26

## 2021-05-26 ENCOUNTER — COMMUNICATION - HEALTHEAST (OUTPATIENT)
Dept: ALLERGY | Facility: CLINIC | Age: 51
End: 2021-05-26
Payer: COMMERCIAL

## 2021-05-26 VITALS
BODY MASS INDEX: 22.88 KG/M2 | DIASTOLIC BLOOD PRESSURE: 80 MMHG | HEIGHT: 64 IN | HEART RATE: 82 BPM | SYSTOLIC BLOOD PRESSURE: 128 MMHG | WEIGHT: 134 LBS | OXYGEN SATURATION: 97 % | RESPIRATION RATE: 12 BRPM

## 2021-05-26 VITALS
OXYGEN SATURATION: 96 % | WEIGHT: 129 LBS | HEART RATE: 86 BPM | RESPIRATION RATE: 12 BRPM | HEIGHT: 63 IN | SYSTOLIC BLOOD PRESSURE: 102 MMHG | BODY MASS INDEX: 22.86 KG/M2 | DIASTOLIC BLOOD PRESSURE: 80 MMHG

## 2021-05-27 NOTE — PROGRESS NOTES
Assessment: /    Plan:    1. Moderate persistent asthma with exacerbation  predniSONE (DELTASONE) 20 MG tablet   2. Moderate persistent asthma without complication  albuterol (PROVENTIL) 2.5 mg /3 mL (0.083 %) nebulizer solution       Prednisone 40 mg daily times 5 days.  Recheck if any problem.  Patient was seen with professional [facility] , [place].      Subjective:    HPI:  Kulwant Amin is a 49-year-old female presenting with cough and wheezing.  This began 1 week ago.  Cough is nonproductive.  She has moderate persistent asthma, using Singulair, Dulera and albuterol.      Review of Systems: No fever or aching.      Current Outpatient Medications   Medication Sig Dispense Refill     albuterol (PROVENTIL) 2.5 mg /3 mL (0.083 %) nebulizer solution Take 3 mL (2.5 mg total) by nebulization every 6 (six) hours as needed for wheezing. 150 mL 12     amitriptyline (ELAVIL) 10 MG tablet TAKE 1 TABLET (10 MG TOTAL) BY MOUTH AT BEDTIME. 30 tablet 10     aspirin (ASPIR-LOW) 81 MG EC tablet Take 1 tablet (81 mg total) by mouth daily. 90 tablet 2     atorvastatin (LIPITOR) 80 MG tablet Take 1 tablet (80 mg total) by mouth at bedtime. 90 tablet 3     blood glucose meter (GLUCOMETER) Use 1 each As Directed 3 (three) times a day. Dispense glucometer brand per patient's insurance at pharmacy discretion.(E11.9) 1 each 0     blood glucose test strips Use 1 each As Directed 3 (three) times a day. Dispense brand per patient's insurance at pharmacy discretion.(E11.9) 300 strip 3     clopidogrel (PLAVIX) 75 mg tablet Take 1 tablet (75 mg total) by mouth daily. 90 tablet 0     cyanocobalamin 1000 MCG tablet Take 1,000 mcg by mouth every evening.       doxylamine (UNISOM, DOXYLAMINE,) 25 mg tablet Take 1 tablet (25 mg total) by mouth at bedtime as needed for sleep. 30 tablet 1     famotidine (PEPCID) 40 MG tablet TAKE 1 PILL (40 MG) BY MOUTH TWICE DAILY FOR STOMACH 60 tablet 10     generic lancets Use 1 each As Directed 3 (three)  times a day. Dispense brand per patient's insurance at pharmacy discretion.(E11.9) 300 each 3     hydroCHLOROthiazide (MICROZIDE) 12.5 mg capsule Take 1 capsule (12.5 mg total) by mouth daily. 90 capsule 2     insulin lispro (HUMALOG KWIKPEN INSULIN) 100 unit/mL pen Use per sliding scale < 150 - No insulin, 151-200 use 3 U, 201- 250 use 5 U,   251- 300 use 8 U 2 adj dose pen 0     MAPAP EXTRA STRENGTH 500 mg tablet TAKE 1 PILL BY MOUTH EVERY 6 HOURS AS NEEDED FOR PAIN 90 tablet 3     metFORMIN (GLUCOPHAGE) 500 MG tablet TAKE 1 TABLET (500 MG TOTAL) BY MOUTH 2 TIMES A DAY WITH MEALS FOR DIABETES 60 tablet 2     metoprolol tartrate (LOPRESSOR) 25 MG tablet Take 1 tablet (25 mg total) by mouth 2 (two) times a day. 180 tablet 1     mometasone-formoterol (DULERA) 200-5 mcg/actuation HFAA inhaler INHALE 2 PUFFS BY MOUTH TWO TIMES A DAY 26 g 11     montelukast (SINGULAIR) 10 mg tablet TAKE 1 PILL (10 MG TOTAL) BY MOUTH AT BEDTIME FOR ASTHMA 30 tablet 10     omeprazole (PRILOSEC) 20 MG capsule TAKE 1 CAPSULE (20 MG TOTAL) BY MOUTH 2 (TWO) TIMES A DAY BEFORE MEALS. 90 capsule 1     polyethylene glycol (MIRALAX) 17 gram packet Take 1 packet (17 g total) by mouth daily. 30 packet 1     predniSONE (DELTASONE) 20 MG tablet Take 40 mg by mouth daily for 5 days. 10 tablet 0     promethazine-dextromethorphan (PROMETHAZINE-DM) 6.25-15 mg/5 mL syrup Take 5 mL by mouth 4 (four) times a day as needed for cough. 150 mL 0     VENTOLIN HFA 90 mcg/actuation inhaler INHALE 2 PUFFS BY MOUTH EVERY 6 HOURS AS NEEDED FOR WHEEZING 18 g 2     No current facility-administered medications for this visit.          Objective:    Vitals:    03/29/19 1047   BP: 112/72   Pulse: (!) 103   Resp: 17   Temp: 98.2  F (36.8  C)   SpO2: 97%       Gen:  NAD, VSS.  Using a cane  Lungs: Scattered wheezes  Heart:  normal          ADDITIONAL HISTORY SUMMARIZED (2): Reviewed February 28 pulmonology note.  DECISION TO OBTAIN EXTRA INFORMATION (1): None.   RADIOLOGY  TESTS (1): None.  LABS (1): None.  MEDICINE TESTS (1): None.  INDEPENDENT REVIEW (2 each): None.     Total Data Points: 2

## 2021-05-27 NOTE — TELEPHONE ENCOUNTER
RN cannot approve Refill Request    RN can NOT refill this medication medication not on med list. Last office visit: 3/14/2019 Adrien Cardoza MD Last Physical: 9/7/2018 Last MTM visit: Visit date not found Last visit same specialty: 3/29/2019 Avi Hankins MD.  Next visit within 3 mo: Visit date not found  Next physical within 3 mo: Visit date not found      Kailee Patton, [facility] Triage/Med Refill 3/31/2019    Requested Prescriptions   Pending Prescriptions Disp Refills     famotidine (PEPCID) 40 MG tablet [Pharmacy Med Name: FAMOTIDINE 40 MG TABLET 40 TAB] 60 tablet 10     Sig: TAKE 1 PILL (40 MG) BY MOUTH TWICE DAILY FOR STOMACH    GI Medications Refill Protocol Passed - 3/29/2019  3:24 PM       Passed - PCP or prescribing provider visit in last 12 or next 3 months.    Last office visit with prescriber/PCP: 3/14/2019 Adrien Cardoza MD OR same dept: 3/29/2019 Avi Hankins MD OR same specialty: 3/29/2019 Avi Hankins MD  Last physical: 9/7/2018 Last MTM visit: Visit date not found   Next visit within 3 mo: Visit date not found  Next physical within 3 mo: Visit date not found  Prescriber OR PCP: Adrien Cardoza MD  Last diagnosis associated with med order: 1. Abdominal pain, epigastric  - famotidine (PEPCID) 40 MG tablet [Pharmacy Med Name: FAMOTIDINE 40 MG TABLET 40 TAB]; TAKE 1 PILL (40 MG) BY MOUTH TWICE DAILY FOR STOMACH  Dispense: 60 tablet; Refill: 10    If protocol passes may refill for 12 months if within 3 months of last provider visit (or a total of 15 months).

## 2021-05-27 NOTE — TELEPHONE ENCOUNTER
Refill Approved    Rx renewed per Medication Renewal Policy. Medication was last renewed on 3/14/19.    Kellie Lott, [facility] Triage/Med Refill 4/4/2019     Requested Prescriptions   Pending Prescriptions Disp Refills     ONETOUCH ULTRA BLUE TEST STRIP strips [Pharmacy Med Name: ONE TOUCH ULTRA TS 50-LG STRP] 100 strip 6     Sig: USE TO TEST BLOOD SUGAR UP TO 3 TIMES DAILY.    Diabetic Supplies Refill Protocol Passed - 4/3/2019 10:34 AM       Passed - Visit with PCP or prescribing provider visit in last 6 months    Last office visit with prescriber/PCP: Visit date not found OR same dept: 3/29/2019 Avi Hankins MD OR same specialty: 3/29/2019 Avi Hankins MD  Last physical: Visit date not found Last MTM visit: Visit date not found   Next visit within 3 mo: Visit date not found  Next physical within 3 mo: Visit date not found  Prescriber OR PCP: Giselle Silva MD  Last diagnosis associated with med order: 1. Hyperglycemia  - ONETOUCH ULTRA BLUE TEST STRIP strips [Pharmacy Med Name: ONE TOUCH ULTRA TS 50-LG STRP]; USE TO TEST BLOOD SUGAR UP TO 3 TIMES DAILY.  Dispense: 100 strip; Refill: 6    If protocol passes may refill for 12 months if within 3 months of last provider visit (or a total of 15 months).            Passed - A1C in last 6 months    Hemoglobin A1c   Date Value Ref Range Status   03/14/2019 6.3 (H) 3.5 - 6.0 % Final

## 2021-05-27 NOTE — TELEPHONE ENCOUNTER
RN triage  Call from pt daughter in law -- signed consent in chart -- daughter in law is with pt  Pt has been sick for 1 week -- 'coughing and asthma' --   Using nebs 3x/day --' only helps a little bit'  Wheezing   No fever   Per protocol = should be seen   No appts are available - pt does not have transportation  Called clinic site and instructed to send encounter for clinic staff to address/assist   Rakel Armstrong RN [facility] RN triage      Reason for Disposition    Wheezing is present    Protocols used: COUGH-A-OH

## 2021-05-28 ENCOUNTER — COMMUNICATION - HEALTHEAST (OUTPATIENT)
Dept: SCHEDULING | Facility: CLINIC | Age: 51
End: 2021-05-28

## 2021-05-28 ASSESSMENT — ASTHMA QUESTIONNAIRES
ACT_TOTALSCORE: 10
ACT_TOTALSCORE: 9
ACT_TOTALSCORE: 8
ACT_TOTALSCORE: 7
ACT_TOTALSCORE: 15
ACT_TOTALSCORE: 10
ACT_TOTALSCORE: 8
ACT_TOTALSCORE: 12
ACT_TOTALSCORE: 7
ACT_TOTALSCORE: 13
ACT_TOTALSCORE: 14
ACT_TOTALSCORE: 7
ACT_TOTALSCORE: 6
ACT_TOTALSCORE: 8
ACT_TOTALSCORE: 5
ACT_TOTALSCORE: 9
ACT_TOTALSCORE: 9
ACT_TOTALSCORE: 8
ACT_TOTALSCORE: 6
ACT_TOTALSCORE: 12

## 2021-05-28 NOTE — PROGRESS NOTES
ASSESMENT AND PLAN:  Diagnoses and all orders for this visit:    Cough  Improving since ED visit.    Moderate persistent asthma without complication  Advised to take oral prednisone 20 mg daily ( RXed from the ED  to take 50 mg daily but she is taking 10 mg daily ).  No wheezing today.    Type 2 diabetes mellitus treated without insulin (H)  -     metFORMIN (GLUCOPHAGE) 500 MG tablet; TAKE 1 TABLET (500 MG TOTAL) BY MOUTH 2 TIMES A DAY WITH MEALS FOR DIABETES  Dispense: 60 tablet; Refill: 2  Last A1c 6.3 in 3/19.  No med changes.    S/P coronary artery stent placement  -     clopidogrel (PLAVIX) 75 mg tablet; Take 1 tablet (75 mg total) by mouth daily.  Dispense: 90 tablet; Refill: 0  Refilled Plavix per request.   Last seen by Cardiology in 2/19, recommended one year follow up.      SUBJECTIVE: Kulwant Amin is a 49-year-old female with history of asthma here for ED follow-up.  She was seen in the ED on 4/28/2019 due to acute worsening of cough and wheezing.  Chest x-ray was unremarkable.  She received oral steroids and nebulizer treatment in the hospital and was sent home with oral steroid to take 10 mg 5 tablets  ( 50 mg ) daily but she is taking only one tab ( 10 mg ) daily. She is afraid that her sugar might get too high with steroid. Her blood sugar numbers are in 130- 140s.. Breathing and cough continue to improve after ED visit. No fever sine ED visit.     Past Medical History:   Diagnosis Date     Acute blood loss anemia      Anxiety      Arthritis      Asthma      Chest wall pain 12/26/2017     COPD (chronic obstructive pulmonary disease) (H)      Depression      Diabetes mellitus (H)      Generalized headaches      History of anesthesia complications     N/V     HTN (hypertension)      Lactic acid acidosis 11/23/2018     Pneumonia      SVT (supraventricular tachycardia) (H)      Unstable angina (H) 10/5/2018     Patient Active Problem List   Diagnosis     HTN (hypertension)     Simple chronic bronchitis  "(H)     GERD (gastroesophageal reflux disease)     SVT (supraventricular tachycardia) (H)     CAD (coronary artery disease)     Type 2 diabetes mellitus treated without insulin (H)     S/P coronary artery stent placement     Moderate persistent asthma     Migraine headache     Cataract     Hyperlipidemia     Rectal bleed     GI bleeding     Lower GI bleeding     Rectal bleeding       Allergies:  No Known Allergies    Social History     Tobacco Use   Smoking Status Former Smoker     Last attempt to quit:      Years since quittin.3   Smokeless Tobacco Never Used       Review of systems otherwise negative except as listed in HPI.   Social History     Tobacco Use   Smoking Status Former Smoker     Last attempt to quit:      Years since quittin.3   Smokeless Tobacco Never Used       OBJECTICE: /62 (Patient Site: Right Arm, Patient Position: Sitting, Cuff Size: Adult Regular)   Pulse 92   Temp 98.3  F (36.8  C) (Oral)   Ht 5' 3\" (1.6 m)   Wt 128 lb 9 oz (58.3 kg)   SpO2 100%   BMI 22.77 kg/m      DATA REVIEWED:  Additional History from Old Records Summarized (2):   Radiology Tests Summarized or Ordered (1): CXR from 19  Labs Reviewed or Ordered (1):      GEN-alert,  in no apparent distress.  HEENT-mucous membranes are moist, neck is supple.  CV-regular rate and rhythm with no murmur.   RESP-no wheezing today.  ABDOMEN- Soft , not tender.  EXTREM- No edema. Sensation intact.  SKIN-normal        Adrien Cardoza   2019   "

## 2021-05-28 NOTE — PROGRESS NOTES
Interp: [id number]    Care Guide followed up with Kulwant Cervantes's daughter in law due to recent ED visit.      ED F/U appointment tomorrow 4/30 at 11:40am with Dr. Cardoza      Next follow up call was scheduled for next week 5/7/19 but due to calling patient's daughter in law today Care Guide spoke with Billy about patient graduating from [facility] and Billy reports that she wants to talk to Kulwant about it and will call the Care Guide back. Care Guide will keep the 5/7/19 outreach call date in case Billy does not call Care Guide back today.      Interp: [phone]    Addendum: Kulwant called the Care Guide back and reports she does not need help with anything specifically and reports that she knows how to call the clinic when she has new concerns, or needs an appointment. The Care Guide informed Kulwant that the Care Guide will send a message to the [facility] RN for further review to determine next steps. Kulwant reports she is okay with graduating from [facility].    2nd Addendum: [facility] RN scheduled an [facility] RN Re-Assessment for 6/14/19      Next Outreach: 6/13/19     - Remind patient about [facility] RN Re-Assessment appointment that is tomorrow 6/14/19 at 9am

## 2021-05-28 NOTE — PROGRESS NOTES
Chart review completed today.   Patient has home care nurse coming weekly to set up meds.   Spoke to daughter in law today and she's has some concerns about patient's asthma.   Patient has f/u appt with Dr. Cardoza on 6/14/19 Patient and DIL agree to come see [facility] RN for RN re-assessment same day at 9am.     [facility] RN will do re-assessment and identify any concerns or goals to work on. Possible RN only goals due to frequestn hospitalizations.

## 2021-05-29 NOTE — PROGRESS NOTES
ASSESMENT AND PLAN:  Diagnoses and all orders for this visit:    Hospital discharge follow-up  Admitted due to chest pain.    Other chest pain  Resolved.    GERD (gastroesophageal reflux disease)  -     omeprazole (PRILOSEC) 20 MG capsule; Take 1 capsule (20 mg total) by mouth daily before breakfast.  Dispense: 90 capsule; Refill: 1  She is taking both omeprazole and ranitidine.  She will discontinue ranitidine.  Symptoms stable.    Essential hypertension  -     hydroCHLOROthiazide (MICROZIDE) 12.5 mg capsule; Take 1 capsule (12.5 mg total) by mouth daily.  Dispense: 90 capsule; Refill: 2  Controlled.  No med changes.    Type 2 diabetes mellitus treated without insulin (H)  Last A1c 6.3.  Continue metformin current dose.  No need to restart sliding scale coverage.    Moderate persistent asthma without complication  Not on acute exacerbation.  Completed 5 days oral steroid.  Pulmonology appointment scheduled next month.    S/P coronary artery stent placement  Recently admitted for chest pain, recommended to follow-up with cardiology in 4 to 6 weeks.  Patient was sent to specialty schedulers to make appointment with cardiology.    This transcription uses voice recognition software, which may contain typographical errors.        SUBJECTIVE: Kulwant Amin is a 49-year-old female with history of multiple medical conditions including CAD, S/P coronary artery stent placement, hypertension, asthma, type 2 diabetes and depression here for hospital discharge follow-up follow-up.  She was admitted overnight from 5/23/2019 to 5/23/2019 due to chest pain.  EKG showed no new acute changes.  CTA was negative.  Serial troponins were negative.  Echo showed no changes when compared to previous study in January 2018.  It was determined that chest pain was most likely secondary to asthma exacerbation.  She was seen here on 5/21/2019 due to acute asthma exacerbation, she completed oral steroid for 5 days.  She used NovoLog sliding scale  "while she was on steroids.  Daughter-in-law reports her blood sugar readings went up to 170s to 190. Her last A1c was 6.3 in .  No chest pain after the hospital discharge.  No medication changes were made upon recent hospital discharge.        Past Medical History:   Diagnosis Date     Acute blood loss anemia      Anxiety      Arthritis      Asthma      Chest wall pain 2017     COPD (chronic obstructive pulmonary disease) (H)      Depression      Diabetes mellitus (H)      Generalized headaches      History of anesthesia complications     N/V     HTN (hypertension)      Lactic acid acidosis 2018     Pneumonia      SVT (supraventricular tachycardia) (H)      Unstable angina (H) 10/5/2018     Patient Active Problem List   Diagnosis     Asthma exacerbation     HTN (hypertension)     Simple chronic bronchitis (H)     GERD (gastroesophageal reflux disease)     SVT (supraventricular tachycardia) (H)     CAD (coronary artery disease)     Type 2 diabetes mellitus treated without insulin (H)     S/P coronary artery stent placement     Moderate persistent asthma     Migraine headache     Cataract     Hyperlipidemia     Chest pain     Rectal bleed     GI bleeding     Lower GI bleeding     Rectal bleeding       Allergies:  No Known Allergies    Social History     Tobacco Use   Smoking Status Former Smoker     Last attempt to quit:      Years since quittin.4   Smokeless Tobacco Never Used       Review of systems otherwise negative except as listed in HPI.   Social History     Tobacco Use   Smoking Status Former Smoker     Last attempt to quit:      Years since quittin.4   Smokeless Tobacco Never Used       OBJECTICE: /66 (Patient Site: Right Arm, Patient Position: Sitting, Cuff Size: Adult Regular)   Pulse 74   Temp 98.6  F (37  C) (Oral)   Ht 5' 1.2\" (1.554 m)   Wt 127 lb (57.6 kg)   SpO2 99%   BMI 23.84 kg/m      DATA REVIEWED:  Additional History from Old Records Summarized (2): "   Labs Reviewed or Ordered (1):       GEN-alert,  in no apparent distress.  HEENT-mucous membranes are moist, neck is supple.  CV-regular rate and rhythm with no murmur.   RESP-lungs clear, no wheezing, crackles or rhonchi.  ABDOMEN- Soft , not tender.  EXTREM- No edema.  No joint swelling or tenderness.  Sensation intact.  SKIN-normal        [facility]   5/28/2019

## 2021-05-29 NOTE — TELEPHONE ENCOUNTER
Refill Approved    Rx renewed per Medication Renewal Policy. Medication was last renewed on 11/24/18.    Kellie Lott, [facility] Triage/Med Refill 5/21/2019     Requested Prescriptions   Pending Prescriptions Disp Refills     albuterol (PROAIR HFA;PROVENTIL HFA;VENTOLIN HFA) 90 mcg/actuation inhaler [Pharmacy Med Name: ALBUTEROL SULFATE  ( 108 (90 BAS AERO] 18 g 2     Sig: INHALE 2 PUFFS BY MOUTH EVERY 6 HOURS AS NEEDED FOR WHEEZING       Albuterol/Levalbuterol Refill Protocol Passed - 5/19/2019 12:50 PM        Passed - PCP or prescribing provider visit in last year     Last office visit with prescriber/PCP: 4/30/2019 Adrien Cardoza MD OR same dept: 4/30/2019 Adrien Cardoza MD OR same specialty: 4/30/2019 Adrien Cardoza MD Last physical: 9/7/2018       Next appt within 3 mo: Visit date not found  Next physical within 3 mo: Visit date not found  Prescriber OR PCP: Adrien Cardoza MD  Last diagnosis associated with med order: 1. Asthma  - albuterol (PROAIR HFA;PROVENTIL HFA;VENTOLIN HFA) 90 mcg/actuation inhaler [Pharmacy Med Name: ALBUTEROL SULFATE  ( 108 (90 BAS AERO]; INHALE 2 PUFFS BY MOUTH EVERY 6 HOURS AS NEEDED FOR WHEEZING  Dispense: 18 g; Refill: 2    If protocol passes may refill for 6 months if within 3 months of last provider visit (or a total of 9 months). If patient requesting >1 inhaler per month refill x 6 months and have patient make appointment with provider.

## 2021-05-29 NOTE — PROGRESS NOTES
ED F/U call      Interp: [id number]    CHW spoke with Billy, patient's DIL and reminded her about Kulwant's upcoming PCP appointment on Tuesday 5/28 at 2pm.    CHW explored if Billy felt she needed to talk with an RN about Kulwant now that she is home from the hospital and Billy reports that everything is fine right now.      Next Outreach: 6/13/19     - Reminder about tomorrow's RN Assessment appt at 9am

## 2021-05-29 NOTE — PROGRESS NOTES
TCM DISCHARGE FOLLOW UP CALL    Discharge Date:  5/23/2019  Reason for hospital stay (discharge diagnosis)::  Chest pain, asthma  Are you feeling better, the same or worse since your discharge?:  Patient is feeling better (-107. States cough is improved, less frequesnt, dyspneas better. Denies resp distress. Good water intake)  Do you feel like you have a plan in the event of a health emergency?: Yes (she talkes with her DIL)    As part of your discharge plan, were  home care services ordered for you?: No    Did you receive any new medications, or was there a change to your medications?: Yes    Are you taking those medications, or do you have any established regiment?:  Albuterol nebs three times a day to four times a day. She is taking prednisone 20 mg two times a day.  Do you have any follow up visits scheduled with your PCP or Specialist?:  Yes, with PCP (5/28)  (RN) Is PCP appt scheduled soon enough (within 14 days of discharge date)?: Yes    RN NOTES::  Instructed pt to make appt at clinic if she starts getting a tight cough or chest or if she increased neb use. Pt agreed with plan.

## 2021-05-29 NOTE — TELEPHONE ENCOUNTER
Medication Question or Clarification  Who is calling: Patient  What medication are you calling about? (include dose and sig)   albuterol (PROAIR HFA;PROVENTIL HFA;VENTOLIN HFA) 90 mcg/actuation inhaler 18 g 4 5/21/2019     Sig: INHALE 2 PUFFS BY MOUTH EVERY 6 HOURS AS NEEDED FOR WHEEZING    Sent to pharmacy as: albuterol (PROAIR HFA;PROVENTIL HFA;VENTOLIN HFA) 90 mcg/actuation inhaler      Sig - Route: Use 1 each As Directed 3 (three) times a day. Dispense brand per patient's insurance at pharmacy discretion.(E11.9) - Miscellaneous    Sent to pharmacy as: blood glucose test strips                        Who prescribed the medication?: Dr. Adrien Cardoza  What is your question/concern?: Patient's son ( consent to communicate on file) states they received letter from pharmacy indicating that above prescriptions will not be covered starting 07/10/19- Writer unsure if caller was asking for a new alternative prescription to be sent to pharmacy, caller was unclear. Please follow up with patient's son or pharmacy for more details/  Pharmacy:  [facility], [address] [place] [address]  Okay to leave a detailed message?: Yes  Site CMT - Please call the pharmacy to obtain any additional needed information.

## 2021-05-29 NOTE — PROGRESS NOTES
Interp: [id number]      CHW spoke with Billy, daughter in law of patient and provided a reminder about tomorrow's [facility] RN appointment that is at 9am, this appointment is to complete a re-assessment for Kulwant to determine if she is ready to graduate from [facility] At this time as Kulwant does have much support from her daughter in law Billy who helps her manage her medical appointments and needs.      Next Outreach: TBD once assessment has been completed

## 2021-05-29 NOTE — PROGRESS NOTES
MTM Consult Encounter    Kulwant Amin is a 49 y.o. female referred for a clinical pharmacist consult from Dr. Cardoza for insulin teaching.    Discussion: Patient being prescribed insulin for the first time with steroid use due to previous hyperglycemia with oral steroid. Provider requested insulin teaching.     Plan:  1. Provided insulin administration teaching based on sliding scale during steroid use    Follow up:   With PCP as instructed    Malu Muñoz, PharmD  Medication Therapy Management Pharmacist  [facility]    Current Outpatient Medications   Medication Sig Dispense Refill     albuterol (PROAIR HFA;PROVENTIL HFA;VENTOLIN HFA) 90 mcg/actuation inhaler INHALE 2 PUFFS BY MOUTH EVERY 6 HOURS AS NEEDED FOR WHEEZING 18 g 4     albuterol (PROVENTIL) 2.5 mg /3 mL (0.083 %) nebulizer solution Take 3 mL (2.5 mg total) by nebulization every 6 (six) hours as needed for wheezing. 150 mL 12     amitriptyline (ELAVIL) 10 MG tablet TAKE 1 TABLET (10 MG TOTAL) BY MOUTH AT BEDTIME. 30 tablet 10     aspirin (ASPIR-LOW) 81 MG EC tablet Take 1 tablet (81 mg total) by mouth daily. 90 tablet 2     atorvastatin (LIPITOR) 80 MG tablet Take 1 tablet (80 mg total) by mouth at bedtime. 90 tablet 3     blood glucose meter (GLUCOMETER) Use 1 each As Directed 3 (three) times a day. Dispense glucometer brand per patient's insurance at pharmacy discretion.(E11.9) 1 each 0     blood glucose test strips Use 1 each As Directed 3 (three) times a day. Dispense brand per patient's insurance at pharmacy discretion.(E11.9) 300 strip 3     clopidogrel (PLAVIX) 75 mg tablet Take 1 tablet (75 mg total) by mouth daily. 90 tablet 0     cyanocobalamin 1000 MCG tablet Take 1,000 mcg by mouth every evening.       doxylamine (UNISOM, DOXYLAMINE,) 25 mg tablet Take 1 tablet (25 mg total) by mouth at bedtime as needed for sleep. 30 tablet 1     famotidine (PEPCID) 40 MG tablet TAKE 1 PILL (40 MG) BY MOUTH TWICE DAILY FOR STOMACH 60 tablet  10     generic lancets Use 1 each As Directed 3 (three) times a day. Dispense brand per patient's insurance at pharmacy discretion.(E11.9) 300 each 3     hydroCHLOROthiazide (MICROZIDE) 12.5 mg capsule Take 1 capsule (12.5 mg total) by mouth daily. 90 capsule 2     insulin lispro (HUMALOG KWIKPEN INSULIN) 100 unit/mL pen Use per sliding scale < 150 - No insulin, 151-200 use 3 U, 201- 250 use 5 U,   251- 300 use 8 U 2 adj dose pen 0     MAPAP EXTRA STRENGTH 500 mg tablet TAKE 1 PILL BY MOUTH EVERY 6 HOURS AS NEEDED FOR PAIN 90 tablet 3     metFORMIN (GLUCOPHAGE) 500 MG tablet TAKE 1 TABLET (500 MG TOTAL) BY MOUTH 2 TIMES A DAY WITH MEALS FOR DIABETES 60 tablet 2     metoprolol tartrate (LOPRESSOR) 25 MG tablet Take 1 tablet (25 mg total) by mouth 2 (two) times a day. 180 tablet 1     mometasone-formoterol (DULERA) 200-5 mcg/actuation HFAA inhaler INHALE 2 PUFFS BY MOUTH TWO TIMES A DAY 26 g 11     montelukast (SINGULAIR) 10 mg tablet TAKE 1 PILL (10 MG TOTAL) BY MOUTH AT BEDTIME FOR ASTHMA 30 tablet 10     omeprazole (PRILOSEC) 20 MG capsule TAKE 1 CAPSULE (20 MG TOTAL) BY MOUTH 2 (TWO) TIMES A DAY BEFORE MEALS. 90 capsule 1     ONETOUCH ULTRA BLUE TEST STRIP strips USE TO TEST BLOOD SUGAR UP TO 3 TIMES DAILY. 300 strip 6     polyethylene glycol (MIRALAX) 17 gram packet Take 1 packet (17 g total) by mouth daily. 30 packet 1     promethazine-dextromethorphan (PROMETHAZINE-DM) 6.25-15 mg/5 mL syrup Take 5 mL by mouth 4 (four) times a day as needed for cough. 150 mL 0     No current facility-administered medications for this visit.

## 2021-05-29 NOTE — PROGRESS NOTES
ASSESMENT AND PLAN:  Diagnoses and all orders for this visit:    Sore throat  -     Rapid Strep A Screen-Throat  -     Group A Strep, RNA Direct Detection, Throat  Negative rapid strep.    Moderate persistent asthma with exacerbation  Still on oral prednisone, 2 more days left.  Advised to finish.  No acute wheezing today.  Pulmonology appointment scheduled on 7/3/2019.  Instructed to keep that appointment.    Type 2 diabetes mellitus treated without insulin (H)  Blood sugar readings are slightly higher than her baseline while on oral steroid but does not require insulin.  No changes.    Coronary artery disease involving native coronary artery of native heart without angina pectoris  Managed by cardiology.  Last seen on 2/26/2019.  Recommended 1 year follow-up.    Hemorrhoids, unspecified hemorrhoid type  -     polyethylene glycol (MIRALAX) 17 gram packet; Take 1 packet (17 g total) by mouth daily.  Dispense: 30 packet; Refill: 1    This transcription uses voice recognition software, which may contain typographical errors.        SUBJECTIVE: Kulwant Amin is a 48-year-old 1-year-old female with diabetes, asthma, hyperlipidemia, CAD S/P stent placement here for ED follow-up.  She has been seen in the ED multiple times in the recent months due to breathing problem,the most recent one was 3 days due to cough and sore throat.  Chest x-ray showed no acute findings.  Labs were unremarkable.  She was sent home with oral steroid.  No fever since the most recent hospital visit.  She is still complaining of mild throat and cough.  No acute chest pain, wheezing or shortness of breath today.    Past Medical History:   Diagnosis Date     Acute blood loss anemia      Anxiety      Arthritis      Asthma      Chest wall pain 12/26/2017     COPD (chronic obstructive pulmonary disease) (H)      Depression      Diabetes mellitus (H)      Generalized headaches      History of anesthesia complications     N/V     HTN (hypertension)       "Lactic acid acidosis 2018     Pneumonia      SVT (supraventricular tachycardia) (H)      Unstable angina (H) 10/5/2018     Patient Active Problem List   Diagnosis     Asthma exacerbation     HTN (hypertension)     Simple chronic bronchitis (H)     GERD (gastroesophageal reflux disease)     SVT (supraventricular tachycardia) (H)     CAD (coronary artery disease)     Type 2 diabetes mellitus treated without insulin (H)     S/P coronary artery stent placement     Moderate persistent asthma     Migraine headache     Cataract     Hyperlipidemia     Chest pain     Rectal bleed     GI bleeding     Lower GI bleeding     Rectal bleeding       Allergies:  No Known Allergies    Social History     Tobacco Use   Smoking Status Former Smoker     Last attempt to quit:      Years since quittin.4   Smokeless Tobacco Never Used       Review of systems otherwise negative except as listed in HPI.   Social History     Tobacco Use   Smoking Status Former Smoker     Last attempt to quit:      Years since quittin.4   Smokeless Tobacco Never Used       OBJECTICE: BP 96/62 (Patient Site: Left Arm, Patient Position: Sitting, Cuff Size: Adult Regular)   Pulse 92   Temp 98.1  F (36.7  C) (Oral)   Resp 16   Ht 5' 1.2\" (1.554 m)   Wt 128 lb 9 oz (58.3 kg)   BMI 24.13 kg/m      DATA REVIEWED:    Labs Reviewed or Ordered (1):      GEN-alert,  in no apparent distress.  HEENT-mucous membranes are moist, neck is supple.  CV-regular rate and rhythm with no murmur.   RESP- No wheezing today.  ABDOMEN- Soft , not tender.  NEURO- Grossly normal.   SKIN-normal        Adrien Cardoza   2019   "

## 2021-05-29 NOTE — PROGRESS NOTES
ASSESMENT AND PLAN:  Diagnoses and all orders for this visit:    Moderate persistent asthma with exacerbation  -     predniSONE (DELTASONE) 20 MG tablet; Take 20 mg by mouth 2 (two) times a day for 5 days.  Dispense: 10 tablet; Refill: 0  -     albuterol (PROAIR HFA;PROVENTIL HFA;VENTOLIN HFA) 90 mcg/actuation inhaler; INHALE 2 PUFFS BY MOUTH EVERY 6 HOURS AS NEEDED FOR WHEEZING  Dispense: 18 g; Refill: 4  Instructed to keep follow-up appointment with pulmonology.  Appreciate help.    Type 2 diabetes mellitus treated without insulin (H)  -     insulin lispro (HUMALOG KWIKPEN INSULIN) 100 unit/mL pen; Use per sliding scale < 150 - No insulin, 151-200 use 3 U, 201- 250 use 5 U,   251- 300 use 8 U  Dispense: 2 adj dose pen; Refill: 0  -     metFORMIN (GLUCOPHAGE) 500 MG tablet; TAKE 1 TABLET (500 MG TOTAL) BY MOUTH 2 TIMES A DAY WITH MEALS FOR DIABETES  Dispense: 60 tablet; Refill: 2  Added sliding scale insulin to use while on prednisone if blood sugar is high.  Teaching done on how to use insulin.     Essential hypertension  -     metoprolol tartrate (LOPRESSOR) 25 MG tablet; Take 1 tablet (25 mg total) by mouth 2 (two) times a day.  Dispense: 180 tablet; Refill: 1  -     hydroCHLOROthiazide (MICROZIDE) 12.5 mg capsule; Take 1 capsule (12.5 mg total) by mouth daily.  Dispense: 90 capsule; Refill: 2  Controlled.  No med changes.    GERD (gastroesophageal reflux disease)  -     omeprazole (PRILOSEC) 20 MG capsule; Take 1 capsule (20 mg total) by mouth daily before breakfast.  Dispense: 90 capsule; Refill: 1    This transcription uses voice recognition software, which may contain typographical errors.        SUBJECTIVE: Kulwant Amin is a 49-year-old female with history of asthma, diabetes, hypertension, coronary artery disease s/p stent placement, history of SVT here with a complaint of ongoing cough for 1 month.  She was seen a month ago, was given oral prednisone.  She took 20 mg once a day for 5 days, she was afraid  "that her blood glucose will get too high.  Daughter-in-law reports that her blood sugar readings were 140s to 180s and one reading of 208 while she was on prednisone.  She is on metformin 500 mg twice a day.  Her last A1c was 6.3 in 2019.  She was last seen by pulmonology in , follow-up appointment scheduled in July.  States she uses all inhalers as prescribed.    Past Medical History:   Diagnosis Date     Acute blood loss anemia      Anxiety      Arthritis      Asthma      Chest wall pain 2017     COPD (chronic obstructive pulmonary disease) (H)      Depression      Diabetes mellitus (H)      Generalized headaches      History of anesthesia complications     N/V     HTN (hypertension)      Lactic acid acidosis 2018     Pneumonia      SVT (supraventricular tachycardia) (H)      Unstable angina (H) 10/5/2018     Patient Active Problem List   Diagnosis     HTN (hypertension)     Simple chronic bronchitis (H)     GERD (gastroesophageal reflux disease)     SVT (supraventricular tachycardia) (H)     CAD (coronary artery disease)     Type 2 diabetes mellitus treated without insulin (H)     S/P coronary artery stent placement     Moderate persistent asthma     Migraine headache     Cataract     Hyperlipidemia     Rectal bleed     GI bleeding     Lower GI bleeding     Rectal bleeding       Allergies:  No Known Allergies    Social History     Tobacco Use   Smoking Status Former Smoker     Last attempt to quit:      Years since quittin.3   Smokeless Tobacco Never Used       Review of systems otherwise negative except as listed in HPI.   Social History     Tobacco Use   Smoking Status Former Smoker     Last attempt to quit:      Years since quittin.3   Smokeless Tobacco Never Used       OBJECTICE: /76 (Patient Site: Right Arm, Patient Position: Sitting, Cuff Size: Adult Regular)   Pulse (!) 108   Temp 98.5  F (36.9  C) (Oral)   Resp 28   Ht 5' 3\" (1.6 m)   Wt 128 lb (58.1 kg)  "  SpO2 98%   BMI 22.67 kg/m            GEN-alert,  in no apparent distress.  HEENT-mucous membranes are moist, neck is supple.  CV-regular rate and rhythm with no murmur.   RESP-coarse breath sounds with mild wheezing bilateral lung fields.  No crackles or rhonchi.  ABDOMEN- Soft , not tender.  EXTREM- No edema.  SKIN-normal        [facility]   5/21/2019

## 2021-05-29 NOTE — TELEPHONE ENCOUNTER
Medication Question or Clarification  Who is calling: Pharmacy: [facility]  What medication are you calling about? (include dose and sig)   insulin lispro (HUMALOG KWIKPEN INSULIN) 100 unit/mL pen 2 adj dose pen 0 5/21/2019     Sig: Use per sliding scale < 150 - No insulin, 151-200 use 3 U, 201- 250 use 5 U,   251- 300 use 8 U    Sent to pharmacy as: insulin lispro (HUMALOG KWIKPEN INSULIN) 100 unit/mL pen    E-Prescribing Status: Receipt confirmed by pharmacy (5/21/2019  2:11 PM CDT)        Who prescribed the medication?: Adrien Jensen  What is your question/concern?: Pharmacy would like to change to Novalog as Humalog is not cover by insurance.  Pharmacy: [facility]  Okay to leave a detailed message?: Yes  Site CMT - Please call the pharmacy to obtain any additional needed information.

## 2021-05-30 VITALS — BODY MASS INDEX: 22.86 KG/M2 | HEIGHT: 63 IN | WEIGHT: 129 LBS

## 2021-05-30 VITALS — BODY MASS INDEX: 24.84 KG/M2 | HEIGHT: 60 IN | WEIGHT: 126.5 LBS

## 2021-05-30 VITALS — HEIGHT: 63 IN | BODY MASS INDEX: 23.28 KG/M2 | WEIGHT: 131.4 LBS

## 2021-05-30 VITALS — HEIGHT: 63 IN | WEIGHT: 124 LBS | BODY MASS INDEX: 21.97 KG/M2

## 2021-05-30 VITALS — WEIGHT: 126.2 LBS | BODY MASS INDEX: 23.83 KG/M2 | HEIGHT: 61 IN

## 2021-05-30 NOTE — PROGRESS NOTES
Assessment/Plan:        Diagnoses and all orders for this visit:    Moderate persistent asthma with exacerbation  -     umeclidinium (INCRUSE ELLIPTA) 62.5 mcg/actuation DsDv inhaler; Inhale 1 puff daily.  Dispense: 1 each; Refill: 11  -     predniSONE (DELTASONE) 20 MG tablet; Take 40 mg by mouth daily for 5 days.  Dispense: 10 tablet; Refill: 0      48-year-old woman with history of organic fuel exposure in the home.  Her PFTs have been previously noted not to be diagnostic due to poor effort and she presents with worsening cough and wheezing on exam consistent with an exacerbation of some obstructive pulmonary disease. For the present we would recommend;    Prednisone 40mg daily for 5 days.     Reminded her to use her Dulera inhaler two times a day. I reminded her through the  to rinse her mouth after using this.    Reminded her that she could use her albuterol up to 6 times a day for rescue.    I explained to her and her daughter in law who is here with her today that if her symptoms did not improve, that she should call us (her grandson speaks fluent English and is here at the appointment today and understands that he will let us know by the end of next week if the prednisone does not alleviate his symptoms).    Tamra ALCANTARA [facility]  Pulmonary and Critical Care  [id number]      Subjective:    Patient ID: Kulwant Amin is a 49 y.o. female.    Ms. Amin is a 49 y.o.female with a past medical history significant for anxiety, arthritis, questionable asthma versus COPD, diabetes, hypertension and a prior history of SVT presents with a follow-up visit for her pulmonary complaints.  As the last time when she had seen me, she states that she has been having more coughing, shortness of breath and wheezing.  She continues to be compliant with her Dulera inhaler but admits that she only uses her albuterol for rescue once a day that when she does she does feel better.  She had called in for symptoms of concerning for an  asthma exacerbation and had been given both an antibiotic and prednisone at this time.  She presently denies fevers, chills but states that she has been unable to sleep for the last 2 weeks because of coughing and chest tightness.  Her ACT today is 2+ 1+1+1+1 =6    Pertinent past medical history:  48-year-old female here for follow-up.  Her breathing is a bit worse than 6 months ago.  She had multiple ER visits and evaluations.  This includes negative PE study.  She had a cath with a 75% LAD lesion which was intervened upon.  Since her catheterization she feels she has more heartburn.  Her breathing is worse with exertion.  Improves with rest.  It is also worse with cold weather.  Warm weather and humid air does not bother.  Symptoms localized to the chest.  Pertinent positives include burning sensation in the chest.  Pertinent negatives include no fever or hemoptysis.    She is relatively inactive in the home.  She does report doing her cardiac rehab.  She tolerated this okay.    Review of Systems  Pertinent as noted in HPI.        Objective:    Physical Exam   Constitutional: She is oriented to person, place, and time. No distress.   Appears older than stated age.  Fatigue.   HENT:   Head: Normocephalic and atraumatic.   Nose: Nose normal.   Eyes: Pupils are equal, round, and reactive to light. Conjunctivae are normal. Right eye exhibits no discharge. Left eye exhibits no discharge. No scleral icterus.   Neck: Normal range of motion. No tracheal deviation present. No thyromegaly present.   Cardiovascular: Normal rate, regular rhythm and normal heart sounds. Exam reveals no gallop.   No murmur heard.  Pulmonary/Chest: Effort normal. No stridor. No respiratory distress. She has wheezes.   Lymphadenopathy:     She has no cervical adenopathy.   Neurological: She is alert and oriented to person, place, and time.   Skin: Skin is warm and dry. She is not diaphoretic. No erythema.   Psychiatric: Her behavior is normal.  Thought content normal.   Depressed affect   Nursing note and vitals reviewed.          Current Outpatient Medications on File Prior to Visit   Medication Sig Dispense Refill     albuterol (PROVENTIL) 2.5 mg /3 mL (0.083 %) nebulizer solution Take 3 mL (2.5 mg total) by nebulization every 6 (six) hours as needed for wheezing. 150 mL 12     albuterol (VENTOLIN HFA) 90 mcg/actuation inhaler Inhale 2 puffs every 6 (six) hours as needed for wheezing. 1 Inhaler 3     amitriptyline (ELAVIL) 10 MG tablet TAKE 1 TABLET (10 MG TOTAL) BY MOUTH AT BEDTIME. 30 tablet 10     aspirin (ASPIR-LOW) 81 MG EC tablet Take 1 tablet (81 mg total) by mouth daily. 90 tablet 2     atorvastatin (LIPITOR) 80 MG tablet Take 1 tablet (80 mg total) by mouth at bedtime. 90 tablet 3     blood glucose meter (GLUCOMETER) Use 1 each As Directed 3 (three) times a day. Dispense glucometer brand per patient's insurance at pharmacy discretion.(E11.9) 1 each 0     blood glucose test strips Use 1 each As Directed 3 (three) times a day. Dispense brand per patient's insurance at pharmacy discretion.(E11.9) 300 strip 3     calcium carbonate (CALCIUM CARBONATE) 300 mg (750 mg) Chew Chew as needed.       clopidogrel (PLAVIX) 75 mg tablet Take 1 tablet (75 mg total) by mouth daily. 90 tablet 0     cyanocobalamin 1000 MCG tablet Take 1,000 mcg by mouth daily.              doxylamine (UNISOM, DOXYLAMINE,) 25 mg tablet Take 1 tablet (25 mg total) by mouth at bedtime as needed for sleep. 30 tablet 1     famotidine (PEPCID) 40 MG tablet TAKE 1 PILL (40 MG) BY MOUTH TWICE DAILY FOR STOMACH 60 tablet 10     generic lancets Use 1 each As Directed 3 (three) times a day. Dispense brand per patient's insurance at pharmacy discretion.(E11.9) 300 each 3     hydroCHLOROthiazide (MICROZIDE) 12.5 mg capsule Take 1 capsule (12.5 mg total) by mouth daily. 90 capsule 2     insulin aspart U-100 (NOVOLOG) 100 unit/mL injection Inject under the skin daily. Use per sliding scale < 150  - No insulin, 151-200 use 3 U, 201- 250 use 5 U,   251- 300 use 8 U       MAPAP EXTRA STRENGTH 500 mg tablet TAKE 1 PILL BY MOUTH EVERY 6 HOURS AS NEEDED FOR PAIN 90 tablet 3     metFORMIN (GLUCOPHAGE) 500 MG tablet TAKE 1 TABLET (500 MG TOTAL) BY MOUTH 2 TIMES A DAY WITH MEALS FOR DIABETES 60 tablet 2     metoprolol tartrate (LOPRESSOR) 25 MG tablet Take 1 tablet (25 mg total) by mouth 2 (two) times a day. 180 tablet 1     mometasone-formoterol (DULERA) 200-5 mcg/actuation HFAA inhaler INHALE 2 PUFFS BY MOUTH TWO TIMES A DAY 26 g 11     montelukast (SINGULAIR) 10 mg tablet TAKE 1 PILL (10 MG TOTAL) BY MOUTH AT BEDTIME FOR ASTHMA 30 tablet 10     omeprazole (PRILOSEC) 20 MG capsule Take 1 capsule (20 mg total) by mouth daily before breakfast. 90 capsule 1     ONETOUCH ULTRA BLUE TEST STRIP strips USE TO TEST BLOOD SUGAR UP TO 3 TIMES DAILY. 300 strip 6     polyethylene glycol (MIRALAX) 17 gram packet Take 1 packet (17 g total) by mouth daily. 30 packet 1     [DISCONTINUED] promethazine-dextromethorphan (PROMETHAZINE-DM) 6.25-15 mg/5 mL syrup Take 5 mL by mouth 4 (four) times a day as needed for cough. 150 mL 0     No current facility-administered medications on file prior to visit.      /78   Pulse 96   Resp 28   Wt 125 lb 8 oz (56.9 kg)   SpO2 98% Comment: RA  BMI 23.56 kg/m      Medical History  Active Ambulatory (Non-Hospital) Problems    Diagnosis     GI bleeding     Lower GI bleeding     Chest pain     Rectal bleed     Rectal bleeding     Hyperlipidemia     Cataract     Moderate persistent asthma     Migraine headache     S/P coronary artery stent placement     Type 2 diabetes mellitus treated without insulin (H)     CAD (coronary artery disease)     SVT (supraventricular tachycardia) (H)     GERD (gastroesophageal reflux disease)     Simple chronic bronchitis (H)     HTN (hypertension)     Asthma exacerbation     Past Medical History:   Diagnosis Date     Acute blood loss anemia      Anxiety       Arthritis      Asthma      Chest wall pain 12/26/2017     COPD (chronic obstructive pulmonary disease) (H)      Depression      Diabetes mellitus (H)      Generalized headaches      History of anesthesia complications      HTN (hypertension)      Lactic acid acidosis 11/23/2018     Pneumonia      SVT (supraventricular tachycardia) (H)      Unstable angina (H) 10/5/2018        Surgical History  She  has a past surgical history that includes Coronary Angiogram (N/A, 1/25/2018); pr colonoscopy flx dx w/collj spec when pfrmd (N/A, 12/10/2018); and pr esophagogastroduodenoscopy transoral diagnostic (N/A, 12/10/2018).       Social History  Reviewed, she lives at home with her family.   Allergies  No Known Allergies                             Data Review - imaging, labs, and ekgs listed below were reviewed by me.  Chest XRay and chest CT images and EKG tracings interpreted personally.     Past Labs  Admission on 06/23/2019, Discharged on 06/23/2019   Component Date Value     Sodium 06/23/2019 140      Potassium 06/23/2019 3.4*     Chloride 06/23/2019 106      CO2 06/23/2019 26      Anion Gap, Calculation 06/23/2019 8      Glucose 06/23/2019 158*     Calcium 06/23/2019 9.4      BUN 06/23/2019 12      Creatinine 06/23/2019 0.73      GFR MDRD Af Amer 06/23/2019 >60      GFR MDRD Non Af Amer 06/23/2019 >60      BNP 06/23/2019 12      Magnesium 06/23/2019 1.9      pH, Venous 06/23/2019 7.41      pCO2, Venous 06/23/2019 39      pO2, Mina 06/23/2019 92*     Base Excess, Venous 06/23/2019 0.1      HCO3, Venous 06/23/2019 25.0      Oxyhemoglobin 06/23/2019 94.8*     O2 Sat, Venous 06/23/2019 98.4*     WBC 06/23/2019 7.2      RBC 06/23/2019 4.50      Hemoglobin 06/23/2019 10.4*     Hematocrit 06/23/2019 34.7*     MCV 06/23/2019 77*     MCH 06/23/2019 23.1*     MCHC 06/23/2019 30.0*     RDW 06/23/2019 17.5*     Platelets 06/23/2019 180      Neutrophils % 06/23/2019 59      Lymphocytes % 06/23/2019 26      Monocytes % 06/23/2019 7       Eosinophils % 06/23/2019 7*     Basophils % 06/23/2019 0      Neutrophils Absolute 06/23/2019 4.2      Lymphocytes Absolute 06/23/2019 1.9      Monocytes Absolute 06/23/2019 0.5      Eosinophils Absolute 06/23/2019 0.5*     Basophils Absolute 06/23/2019 0.0    Office Visit on 06/11/2019   Component Date Value     Rapid Strep A Antigen 06/11/2019 No Group A Strep detected, presumptive negative      Group A Strep by PCR 06/11/2019 No Group A Strep rRNA detected    Admission on 05/22/2019, Discharged on 05/23/2019   Component Date Value     VENTRICULAR RATE 05/22/2019 115      ATRIAL RATE 05/22/2019 115      P-R INTERVAL 05/22/2019 134      QRS DURATION 05/22/2019 90      Q-T INTERVAL 05/22/2019 356      QTC CALCULATION (BEZET) 05/22/2019 492      P Axis 05/22/2019 54      R AXIS 05/22/2019 -32      T AXIS 05/22/2019 29      MUSE DIAGNOSIS 05/22/2019                      Value:Sinus tachycardia  Left axis deviation  Poor R wave progression  Long QTc  Abnormal ECG  When compared with ECG of 14-JAN-2019 11:07,  RSR' pattern in V1 is no longer Present  Confirmed by LEAH COLORADO, LES LOC: ([id number]) on 5/23/2019 4:34:44 PM       WBC 05/22/2019 9.3      RBC 05/22/2019 5.09      Hemoglobin 05/22/2019 11.8*     Hematocrit 05/22/2019 38.9      MCV 05/22/2019 76*     MCH 05/22/2019 23.2*     MCHC 05/22/2019 30.3*     RDW 05/22/2019 17.8*     Platelets 05/22/2019 275      Sodium 05/22/2019 137      Potassium 05/22/2019 4.2      Chloride 05/22/2019 104      CO2 05/22/2019 20*     Anion Gap, Calculation 05/22/2019 13      Glucose 05/22/2019 316*     BUN 05/22/2019 14      Creatinine 05/22/2019 0.82      GFR MDRD Af Amer 05/22/2019 >60      GFR MDRD Non Af Amer 05/22/2019 >60      Bilirubin, Total 05/22/2019 0.6      Calcium 05/22/2019 10.0      Protein, Total 05/22/2019 8.4*     Albumin 05/22/2019 3.9      Alkaline Phosphatase 05/22/2019 113      AST 05/22/2019 18      ALT 05/22/2019 19      Lipase 05/22/2019 19      INR  05/22/2019 1.01      PTT 05/22/2019 26      Troponin I 05/22/2019 <0.01      Beta hCG Qualitative 05/22/2019 Negative      BNP 05/22/2019 28      VENTRICULAR RATE 05/22/2019 85      ATRIAL RATE 05/22/2019 85      P-R INTERVAL 05/22/2019 154      QRS DURATION 05/22/2019 90      Q-T INTERVAL 05/22/2019 390      QTC CALCULATION (BEZET) 05/22/2019 464      P Axis 05/22/2019 58      R AXIS 05/22/2019 -3      T AXIS 05/22/2019 37      MUSE DIAGNOSIS 05/22/2019                      Value:Normal sinus rhythm  Normal ECG  When compared with ECG of 22-MAY-2019 14:00,  No significant change was found  Confirmed by KAITLYNN HENDERSON MD LOC: ([id number]) on 5/23/2019 2:33:43 PM       LV volume diastolic 05/23/2019 67.8      LV volume systolic 05/23/2019 22.2      HR 05/23/2019 63      IVSd 05/23/2019 1.39*     LVIDd 05/23/2019 2.6*     LVIDs 05/23/2019 2.75      LVOT diam 05/23/2019 1.9      LVOT mean gradient 05/23/2019 2      LVOT peak VTI 05/23/2019 22.3      LVOT mean dahiana 05/23/2019 70.6      LVOT peak dahiana 05/23/2019 109      LVOT peak gradient 05/23/2019 5      LV PWd 05/23/2019 1*     MV E' lat dahiana 05/23/2019 10.3      MV E' med dahiana 05/23/2019 9.96      AV mean dahiana 05/23/2019 75.3      AV mean gradient 05/23/2019 3      AV VTI 05/23/2019 22.5      AV peak dahiana 05/23/2019 113      AO root 05/23/2019 3.1      AO ascending 05/23/2019 3.3      LA size 05/23/2019 3.3      LA length 05/23/2019 4.8      MV decel slope 05/23/2019 2,900      MV decel time 05/23/2019 229      MV P 1/2 time 05/23/2019 67      MV peak A dahiana 05/23/2019 63.5      MV peak E dahiana 05/23/2019 66.5      LA area 2 05/23/2019 14.9      LA area 1 05/23/2019 15.5      BSA 05/23/2019 1.58      Hieght 05/23/2019 61.2      Weight 05/23/2019 2,051.2      BP 05/23/2019 123/72      IVS/PW ratio 05/23/2019 1.4      LV FS 05/23/2019 -5.8      Echo LVEF calculated 05/23/2019 67      LA volume 05/23/2019 40.9      LV mass 05/23/2019 89.4      AV area 05/23/2019 2.8      AV DIM  IND dahiana 05/23/2019 1.0      MV area p 1/2 time 05/23/2019 3.3      MV E/A Ratio 05/23/2019 1.0      LVOT area 05/23/2019 2.83      LVOT SV 05/23/2019 63.2      AV peak gradient 05/23/2019 5.1      LV systolic volume index 05/23/2019 14.1      LV diastolic volume index 05/23/2019 42.9      LA volume index 05/23/2019 25.9      LV mass index 05/23/2019 56.6      LV SVi 05/23/2019 40.0      TAPSE 05/23/2019 1.8      MV med E/e' ratio 05/23/2019 6.7      MV lat E/e' ratio 05/23/2019 6.5      LV CO 05/23/2019 4.0      LV Ci 05/23/2019 2.5      Height 05/23/2019 61.2      Weight 05/23/2019 128      MV Avg E/e' Ratio 05/23/2019 6.6      AV DIM IND VTI 05/23/2019 1.0      Glucose 05/22/2019 164*     Troponin I 05/22/2019 <0.01      Troponin I 05/23/2019 <0.01      Glucose 05/22/2019 110      Glucose 05/23/2019 179*     Glucose 05/23/2019 154*     CT chest images reviewed and interpreted by me: No pulmonary emboli.  Bring him is essentially clear.    Chest x-ray images reviewed and interpreted by me: Lungs essentially clear.  Xr Chest 2 Views    Result Date: 6/23/2019  EXAM: XR CHEST 2 VIEWS LOCATION: [facility] DATE/TIME: 6/23/2019 2:39 PM INDICATION: dyspnea, cough COMPARISON: 6/9/2019 FINDINGS: Coronary artery stent is unchanged. Otherwise negative chest.    Xr Chest 2 Views    Result Date: 6/9/2019  EXAM: XR CHEST 2 VIEWS LOCATION: [facility] DATE/TIME: 6/9/2019 4:58 PM INDICATION: cough COMPARISON: 04/28/2019 FINDINGS: A small calcified nodules again seen in the right lung consistent with old granulomatous disease. The pulmonary vasculature, cardiac silhouette, and pleural spaces appear normal. There is no evidence of acute pulmonary disease.    Cta Chest [place]    Result Date: 5/22/2019  EXAM: CTA CHEST PE RUN LOCATION: [facility] DATE/TIME: 5/22/2019 4:44 PM INDICATION: chest pain chest pain COMPARISON: 11/23/2018 TECHNIQUE: Helical acquisition through the chest was performed during the  arterial phase of contrast enhancement using IV contrast. 2D and 3D reconstructions were performed by the CT technologist. Dose reduction techniques were used. IV CONTRAST: Iohexol (Omni) 100 mL FINDINGS: ANGIOGRAM CHEST: Pulmonary arteries are normal caliber and negative for pulmonary emboli. Normal caliber thoracic aorta with no dissection or aneurysm. Calcified atherosclerotic plaque is seen in the thoracic aorta and its major branches including the coronary arteries. RV/LV RATIO: N/A LUNGS AND PLEURA: Multiple tiny 2-3 mm nodules in the lungs are again identified and are not significantly changed. No new pulmonary nodules are identified. Dependent opacities in both lungs are consistent with atelectasis or fibrosis. A small calcified pleural plaque is again seen on the right anterolaterally. A 6 mm nodule in the right costophrenic angle on series 3, image 18 is also unchanged. This may represent an area of atelectasis. However, was also visible on the CT on 01/29/2018 and appear stable compared to the exam is well. MEDIASTINUM: No lymphadenopathy. LIMITED UPPER ABDOMEN: Negative. MUSCULOSKELETAL: Negative.     CONCLUSION: 1.  No evidence pulmonary embolism. 2.  Small pulmonary nodules in both lungs are unchanged compared to the prior exams dating back to 01/29/2018. Follow-up is recommended as per the Fleischner guidelines. 3.  Small calcified pleural plaque on the right. Given that it is focal and solitary it is nonspecific. They could be related to prior asbestos exposure but more likely infection or trauma. 4.  Calcified atherosclerotic plaque in the visualized arteries. REFERENCE: Guidelines for Management of Incidental Pulmonary Nodules Detected on CT Images: From the Fleischner Society 2017. Guidelines apply to incidental nodules in patients who are 35 years or older. Guidelines do not apply to lung cancer screening, patients with immunosuppression, or patients with known primary cancer. MULTIPLE NODULES  Nodule size <6 mm Low-risk patients: No follow-up needed. High-risk patients: Optional follow-up at 12 months. Nodule size 6 mm or larger Low-risk patients: Follow-up CT at 3-6 months, then consider CT at 18-24 months. High-risk patients: Follow-up CT at 3-6 months, then at 18-24 months if no change. -Use most suspicious nodule as guide to management. Consider referral to lung nodule clinic.     Cardiac catheterization report reviewed by me: LAD lesion status post stent.

## 2021-05-30 NOTE — PROGRESS NOTES
Patient instructed in use of Incruse ellipta inhaler.   present.  Patient states good understanding of how to use the ellipta device.  Printed instructions as well as phone numbers to call with any questions sent home with patient.

## 2021-05-30 NOTE — TELEPHONE ENCOUNTER
Kulwant's son Rhys and his wife Billy are in the office today to inform provider that the pharmacy informed them that Kulwant's insurance is no longer covering certain medications. The guests could not tell writer which medications, but asked that clinic contact [facility] to clarify the issue of non-coverage of drugs. Padmini has indicated that she would like staff to contact her at [phone] with an update.     Writer contacted [facility]. The Omeprazole requires a PA (max quantity of #90 every 365 days). Ellipta inhaler is in PA process and a decision has not been made.     [facility] notified Padmini and Rhys regarding information/update from pharmacy as above.     Does MD agree with starting Omeprazole PA? Thank you.

## 2021-05-30 NOTE — TELEPHONE ENCOUNTER
PA for omeprazole approved. Please call the patient to go pick it up from pharmacy.    Can you please call the pharmacy to cancel the Rx for pantoprazole sent on 7/11/19.    Thank you,    Dr. Adrien Cardoza  7/16/2019 7:47 AM

## 2021-05-30 NOTE — TELEPHONE ENCOUNTER
Can we start PA for omeprazole?  Tried Zantac for a few months, did not work.    Dr. Adrien Cardoza  7/9/2019 7:31 AM

## 2021-05-30 NOTE — TELEPHONE ENCOUNTER
Medication Request  Medication name: Generic test strips, once a day testing  Pharmacy Name and Location: [facility], [place]  Reason for request: Currently using  When did you use medication last?:  Four days ago  Patient offered appointment:  Patient was seen 7/11/19  Okay to leave a detailed message: yes    Patient calling with interpretor.  ID # [id number], Armand    Please send order noting to dispense brand covered per patient's insurance.

## 2021-05-30 NOTE — PROGRESS NOTES
[employer] Upgrade    Open Encounter today.  Episodes created, care management status validated and encounters linked      Next Outreach: 7/23/2019     - Reminder about MRI appt tomorrow Wednesday 7/24 at 1pm

## 2021-05-30 NOTE — PROGRESS NOTES
Call from daughter stating that Incruse is not covered by patient's insurance.  Sent new Rx for spiriva as replacement.

## 2021-05-30 NOTE — TELEPHONE ENCOUNTER
Refill Approved    Rx renewed per Medication Renewal Policy. Medication was last renewed on 7/18/18.    Kellie Lott, [facility] Triage/Med Refill 7/17/2019     Requested Prescriptions   Pending Prescriptions Disp Refills     aspirin 81 MG EC tablet [Pharmacy Med Name: ASPIRIN 81 MG LOW STRENGTH 81 TAB] 90 tablet 2     Sig: TAKE 1 TABLET (81 MG TOTAL) BY MOUTH DAILY.       Aspirin/Dipyridamole Refill Protocol Passed - 7/17/2019  9:55 AM        Passed - PCP or prescribing provider visit in past 12 months       Last office visit with prescriber/PCP: 7/11/2019 Adrien Cardoza MD OR same dept: 7/11/2019 Adrien Cardoza MD OR same specialty: 7/11/2019 Adrien Cardoza MD  Last physical: 9/7/2018 Last MTM visit: 5/21/2019 Malu Muñoz, PharmD    Next appt within 3 mo: Visit date not found Next physical within 3 mo: Visit date not found  Prescriber OR PCP: Adrien Cardoza MD  Last diagnosis associated with med order: 1. Coronary artery disease involving native coronary artery of native heart with angina pectoris (H)  - aspirin 81 MG EC tablet [Pharmacy Med Name: ASPIRIN 81 MG LOW STRENGTH 81 TAB]; TAKE 1 TABLET (81 MG TOTAL) BY MOUTH DAILY.  Dispense: 90 tablet; Refill: 2    If protocol passes may refill for 6 months if within 3 months of last provider visit (or a total of 9 months).

## 2021-05-30 NOTE — PATIENT INSTRUCTIONS - HE
1. Please use your Dulera inhaler twice a day every day whether or not you are short of breath, this is not a rescue inhaler so do not use this if you are acutely short of breath.  2. Please be sure to gargle your mouth after using your Dulera inhaler.  3. Please use your Incruse inhaler once a day every day whether or not you are short of breath, this is not a rescue inhaler so do not use this if you are acutely short of breath.  4. Please use your albuterol inhaler 2 puffs up to 6 times a day for rescue. If you are not short of breath, you do not need to use this.  5. Please contact us in a week if your symptoms do not improve.

## 2021-05-30 NOTE — TELEPHONE ENCOUNTER
Central PA team  [phone]  Pool: HE PA MED ([id number])          PA has been initiated.       PA form completed and faxed insurance via Cover My Meds     Key:  FARHANA RAZA Case ID: [id number]     Medication:  Omeprazole 20MG dr capsules    Insurance:  [facility]        Response will be received via fax and may take up to 5-10 business days depending on plan

## 2021-05-30 NOTE — TELEPHONE ENCOUNTER
Dr. Hirsch,  Patient is requesting a sedative to undergo cardiac stress MRI.  Can a prescription be sent into patient's pharmacy?  Thank you,  Martine

## 2021-05-30 NOTE — PROGRESS NOTES
Patient has [facility] RN Re-Assessment scheduled for Monday 7/1/19 and would like to have more follow up regarding her health with the [facility] RN at this appointment.    Next Outreach: TBD by [facility] RN as patient may graduate from [facility]

## 2021-05-30 NOTE — TELEPHONE ENCOUNTER
Called patient, via [facility] ,to inform her a prescription for Valium 5 mg tablet has been called into her pharmacy.  She was instructed to have a  the day of the MRI due to sedation.  Valium 5 mg called into preferred pharmacy.

## 2021-05-30 NOTE — PROGRESS NOTES
Cardiac stress MRI completed.  Encouraged to drink 8-10 glasses of water today.  Diazepam 5 mg PO taken prior to test.  Pt used all 3 inhalers prior to MRI.  Son & daughter in law here to drive her home

## 2021-05-30 NOTE — PROGRESS NOTES
ASSESMENT AND PLAN:  Diagnoses and all orders for this visit:    Type 2 diabetes mellitus treated without insulin (H)  -     Glycosylated Hemoglobin A1c  -     PHQ9 Depression Screen  -     Comprehensive Metabolic Panel    A1c 6.5 no med changes today.  Continue metformin 500 mg twice daily.    Gastroesophageal reflux disease without esophagitis  We will try pantoprazole.    Hyperlipidemia, unspecified hyperlipidemia type  Currently on Lipitor 80 mg daily.  Recheck lipids at next visit.    Essential hypertension  Controlled with current regimen.    Moderate persistent asthma without complication  Managed by pulmonology.    Coronary artery disease involving native coronary artery of native heart without angina pectoris  S/P stent placement.  Managed by cardiology.    This transcription uses voice recognition software, which may contain typographical errors.      SUBJECTIVE: Kulwant Amin is a 49-year-old female with multiple medical conditions seen multiple specialist here to follow-up on diabetes.      She sees cardiology and pulmonology.  Recently seen by cardiology 2 days, ago ordered Regadenoson on MRI ordered due to ongoing exertional chest pain.  The procedure is scheduled on 7/24/2019.    She was seen by pulmonology on 7/3/2019.  Was given 5 days course of oral steroid.  Daughter-in-law reports her blood sugar was slightly high while she was on steroid but did not need sliding scale insulin.  She is on metformin 500 mg twice daily, last A1c was 6.3 in 3/19.  Her fasting blood sugar readings are mostly in the 110s to 120s.  No low blood sugar with hypoglycemic symptoms reported.    She was on omeprazole for GERD symptoms.  It is no longer covered by her insurance.  Daughter-in-law states that pharmacy informed her pantoprazole will be covered.  She was on Zantac in the past with no symptom relief.  No acute nausea or vomiting.  No acute chest pain or burning today.    Past Medical History:   Diagnosis Date      "Acute blood loss anemia      Anxiety      Arthritis      Asthma      Chest wall pain 2017     COPD (chronic obstructive pulmonary disease) (H)      Depression      Diabetes mellitus (H)      Generalized headaches      History of anesthesia complications     N/V     HTN (hypertension)      Lactic acid acidosis 2018     Pneumonia      SVT (supraventricular tachycardia) (H)      Unstable angina (H) 10/5/2018     Patient Active Problem List   Diagnosis     HTN (hypertension)     GERD (gastroesophageal reflux disease)     SVT (supraventricular tachycardia) (H)     CAD (coronary artery disease)     Type 2 diabetes mellitus treated without insulin (H)     S/P coronary artery stent placement     Moderate persistent asthma     Migraine headache     Cataract     Hyperlipidemia     Chest pain     Rectal bleed     Lower GI bleeding       Allergies:  No Known Allergies    Social History     Tobacco Use   Smoking Status Former Smoker     Last attempt to quit:      Years since quittin.5   Smokeless Tobacco Never Used       Review of systems otherwise negative except as listed in HPI.   Social History     Tobacco Use   Smoking Status Former Smoker     Last attempt to quit:      Years since quittin.5   Smokeless Tobacco Never Used       OBJECTICE: /68 (Patient Site: Right Arm, Patient Position: Sitting, Cuff Size: Adult Regular)   Pulse (!) 103   Temp 98.3  F (36.8  C) (Oral)   Ht 5' 0.63\" (1.54 m)   Wt 125 lb 6.4 oz (56.9 kg)   SpO2 97%   BMI 23.98 kg/m      DATA REVIEWED:  Additional History from Old Records Summarized (2):   Labs Reviewed or Ordered (1):       GEN-alert,  in no apparent distress.  HEENT-mucous membranes are moist, neck is supple.  CV-regular rate and rhythm with no murmur.   RESP-lungs clear to auscultation .  ABDOMEN- Soft , not tender.  EXTREM- No open lesions or ulcers. Sensation intact. No joint swelling or tenderness.   SKIN-normal        [place] [facility]   2019   "

## 2021-05-30 NOTE — TELEPHONE ENCOUNTER
----- Message from Dania Lu sent at 7/10/2019  2:58 PM CDT -----  ..General phone call:    Caller: Kulwant  Primary cardiologist: Dr. Hirsch  Detailed reason for call: This pt is scheduled for a Stress MRI 7/24, and is requesting sedation if possible.   New or active symptoms? No  Best phone number: [phone]  Best time to contact: Any  Ok to leave a detailed message? Yes  Device? No    Additional Info:

## 2021-05-30 NOTE — PROGRESS NOTES
Interp: [id number]      Reminder provided about MRI appointment for Kulwant that is tomorrow at 1pm. Billy reports she understands and will be bringing Kulwant to the appointment.      Next Outreach: 10/1/19      Plan:     - CHW will remind Billy that Kulwant has her Pulmonology appt on Wednesday 10/2 at 11am, arrive at 10:45am   - CCC RN to review chart and f/u with Billy on 8/2/19   - CCC RN Only Goal and therefor CCC RN will complete a chart review and f/u in September

## 2021-05-31 VITALS — WEIGHT: 130 LBS | HEIGHT: 61 IN | BODY MASS INDEX: 24.55 KG/M2

## 2021-05-31 VITALS — WEIGHT: 132.44 LBS | BODY MASS INDEX: 25 KG/M2 | HEIGHT: 61 IN

## 2021-05-31 VITALS — WEIGHT: 132.8 LBS | BODY MASS INDEX: 23.52 KG/M2

## 2021-05-31 VITALS — WEIGHT: 131.38 LBS | HEIGHT: 63 IN | BODY MASS INDEX: 23.28 KG/M2

## 2021-05-31 VITALS — BODY MASS INDEX: 19.49 KG/M2 | BODY MASS INDEX: 19.49 KG/M2 | HEIGHT: 63 IN | WEIGHT: 110 LBS

## 2021-05-31 VITALS — HEIGHT: 63 IN | WEIGHT: 132.31 LBS | BODY MASS INDEX: 23.44 KG/M2

## 2021-05-31 VITALS — HEIGHT: 61 IN | BODY MASS INDEX: 24.4 KG/M2 | WEIGHT: 129.25 LBS

## 2021-05-31 VITALS — WEIGHT: 128 LBS | BODY MASS INDEX: 22.67 KG/M2

## 2021-05-31 VITALS — BODY MASS INDEX: 22.5 KG/M2 | WEIGHT: 127 LBS | HEIGHT: 63 IN

## 2021-05-31 VITALS — HEIGHT: 62 IN | BODY MASS INDEX: 24.13 KG/M2 | WEIGHT: 131.1 LBS

## 2021-05-31 VITALS — BODY MASS INDEX: 23.45 KG/M2 | WEIGHT: 132.4 LBS

## 2021-05-31 VITALS — WEIGHT: 133.5 LBS | BODY MASS INDEX: 23.65 KG/M2

## 2021-05-31 VITALS — BODY MASS INDEX: 22.5 KG/M2 | WEIGHT: 127 LBS

## 2021-05-31 VITALS — WEIGHT: 129.19 LBS | BODY MASS INDEX: 22.88 KG/M2

## 2021-05-31 VITALS — WEIGHT: 132 LBS | HEIGHT: 63 IN | BODY MASS INDEX: 23.39 KG/M2

## 2021-05-31 VITALS — BODY MASS INDEX: 23.04 KG/M2 | HEIGHT: 63 IN | WEIGHT: 130 LBS

## 2021-05-31 VITALS — BODY MASS INDEX: 22.32 KG/M2 | WEIGHT: 126 LBS

## 2021-05-31 VITALS — HEIGHT: 63 IN | BODY MASS INDEX: 22.86 KG/M2 | WEIGHT: 129 LBS

## 2021-05-31 VITALS — WEIGHT: 129.2 LBS | HEIGHT: 63 IN | BODY MASS INDEX: 22.89 KG/M2

## 2021-05-31 VITALS — WEIGHT: 129 LBS | BODY MASS INDEX: 22.85 KG/M2

## 2021-05-31 VITALS — WEIGHT: 131 LBS | BODY MASS INDEX: 23.21 KG/M2

## 2021-05-31 VITALS — BODY MASS INDEX: 23.46 KG/M2 | WEIGHT: 132.44 LBS

## 2021-05-31 VITALS — WEIGHT: 129.13 LBS | BODY MASS INDEX: 22.04 KG/M2 | HEIGHT: 64 IN

## 2021-05-31 VITALS — WEIGHT: 130 LBS | BODY MASS INDEX: 23.03 KG/M2

## 2021-05-31 VITALS — WEIGHT: 127 LBS | BODY MASS INDEX: 24.29 KG/M2

## 2021-05-31 VITALS — BODY MASS INDEX: 22.67 KG/M2 | WEIGHT: 128 LBS

## 2021-05-31 NOTE — TELEPHONE ENCOUNTER
Refill Approved    Rx renewed per Medication Renewal Policy. Medication was last renewed on 4/30/19.    Kellie Lott, [facility] Triage/Med Refill 8/2/2019     Requested Prescriptions   Pending Prescriptions Disp Refills     clopidogrel (PLAVIX) 75 mg tablet [Pharmacy Med Name: CLOPIDOGREL 75 MG TABLET 75 TAB] 90 tablet 0     Sig: TAKE 1 TABLET (75 MG TOTAL) BY MOUTH DAILY.       Clopidogrel/Prasugrel/Ticagrelor Refill Protocol Passed - 8/2/2019  9:55 AM        Passed - PCP or prescribing provider visit in past 6 months       Last office visit with prescriber/PCP: 7/11/2019 OR same dept: 7/11/2019 Adrien Cardoza MD OR same specialty: 7/11/2019 Adrien Cardoza MD Last physical: Visit date not found Last MTM visit: 5/21/2019 Malu Muñoz, PharmD     Next appt within 3 mo: Visit date not found  Next physical within 3 mo: Visit date not found  Prescriber OR PCP: Adrien Cardoza MD  Last diagnosis associated with med order: 1. S/P coronary artery stent placement  - clopidogrel (PLAVIX) 75 mg tablet [Pharmacy Med Name: CLOPIDOGREL 75 MG TABLET 75 TAB]; Take 1 tablet (75 mg total) by mouth daily.  Dispense: 90 tablet; Refill: 0    If protocol passes may refill for 6 months if within 3 months of last provider visit (or a total of 9 months).              Passed - Hemoglobin in past 12 months     Hemoglobin   Date Value Ref Range Status   08/02/2019 10.0 (L) 12.0 - 16.0 g/dL Final

## 2021-05-31 NOTE — TELEPHONE ENCOUNTER
----- Message from Lucia Jacobs sent at 8/9/2019 10:39 AM CDT -----  Contact: Son  Caller: Rhys son    Primary cardiologist: Dr. Hirsch    Detailed reason for call: Intermittant high pulse rate, 115 and then 82.  Please call back.     Best phone number: [phone]     Best time to contact: Today    Ok to leave a detailed message? No    Device? No

## 2021-05-31 NOTE — TELEPHONE ENCOUNTER
Dr. Hirsch,  Please see patient concerns below.  Follow up is scheduled 10/3/19 with you.  Do you have any new orders or recommendations in the interim?  Thank you,  Martine    ------------------------------------------------  Reached out to patient's son, Rhys, to discuss his concerns.    Patient's pulse is running between 80's to 107 bpm at this time and increasing to 150 bpm at times.  She denies palpitations, chest pain, lightheadedness, dizziness, fever and worsening shortness of breath because of increased heart rate.    Noted patient was recently evaluated in the ED for COPD exacerbation.  She did receive solumedrol and sent home with a 5 day course of prednisone.  She is using nebulizer treatments as many as 6 times per day and albuterol inhaler every 6 hours. She has a persistent cough throughout the day.  She was seen by Dr. Adrien Cardoza yesterday and found to have a normal heart rate and rhythm.     Reviewed that COPD and its complications and treatment can increase her heart rate. Also reviewed symptoms of when to be evaluated in the ED.   Patient's son requesting a follow up appointment with Dr. Hirsch.

## 2021-05-31 NOTE — PROGRESS NOTES
ASSESMENT AND PLAN:  Diagnoses and all orders for this visit:    Asthma without status asthmaticus  -     montelukast (SINGULAIR) 10 mg tablet; TAKE 1 PILL (10 MG TOTAL) BY MOUTH AT BEDTIME FOR ASTHMA  Dispense: 30 tablet; Refill: 10  Recently seen in the ED on 8/2/2019.  O2 sat 98% in room air today.  No wheezing on exam.    Type 2 diabetes mellitus treated without insulin (H)  -     generic lancets; Use 1 each As Directed 3 (three) times a day. Dispense brand per patient's insurance at pharmacy discretion.(E11.9)  Dispense: 300 each; Refill: 3  -     metFORMIN (GLUCOPHAGE) 500 MG tablet; TAKE 1 TABLET (500 MG TOTAL) BY MOUTH 2 TIMES A DAY WITH MEALS FOR DIABETES  Dispense: 60 tablet; Refill: 2  Patient has scheduled appointment for diabetes next month.  She will keep that appointment.    Pain  -     amitriptyline (ELAVIL) 10 MG tablet; Take 1 tablet (10 mg total) by mouth at bedtime.  Dispense: 30 tablet; Refill: 10  Generalized body ache.  Stable on amitriptyline.    SVT (supraventricular tachycardia) (H)  Last seen by cardiology last month.  Recommended 1 year follow-up.  Daughter-in-law reported increased heart rate in the 120s to 150s at home in the recent weeks.  Normal rate and rhythm today.  Advised to call cardiology if she continues to have increased heart rate at home.     S/P coronary artery stent placement  Seen by cardiology last month.  Recommended 1 year follow-up.    Anemia, unspecified type  Hemoglobin 10.0 last week.  Continue iron supplement.  To recheck at next visit.    This transcription uses voice recognition software, which may contain typographical errors.        This transcription uses voice recognition software, which may contain typographical errors.      SUBJECTIVE: Kulwant Amin is a 49-year-old female with history of multiple medical problems including asthma here for ED follow-up.  She was seen in the ED last week due to shortness of breath and low oxygen saturation noted by home  nurse.  Reported oxygen saturation at home was 75% per daughter-in-law that day.  She was recommended to go to the ED for evaluation by home health nurse.  In the ED her oxygen was 97% on room air.  Chest x-ray showed no acute findings.  All labs including CBC, BMP were unremarkable except for known anemia.  Troponin was negative.  She received Solu-Medrol injection in the ED and was sent home with oral prednisone for 5 days.  She is feeling much better today.  No acute shortness of breath or wheezing.  Her blood sugar were in high 100s to 200s while she was on oral steroid but now back to her baseline at 120s to 130s.  No new complaints or concerns since her most recent ED visit on 19.    Past Medical History:   Diagnosis Date     Acute blood loss anemia      Anxiety      Arthritis      Asthma      Chest wall pain 2017     COPD (chronic obstructive pulmonary disease) (H)      Depression      Diabetes mellitus (H)      Diabetes mellitus, type II (H)      Generalized headaches      GERD (gastroesophageal reflux disease)      High cholesterol      History of anesthesia complications     N/V     HTN (hypertension)      Lactic acid acidosis 2018     Pneumonia      SVT (supraventricular tachycardia) (H)      Unstable angina (H) 10/5/2018     Patient Active Problem List   Diagnosis     HTN (hypertension)     GERD (gastroesophageal reflux disease)     SVT (supraventricular tachycardia) (H)     CAD (coronary artery disease)     Type 2 diabetes mellitus treated without insulin (H)     S/P coronary artery stent placement     Moderate persistent asthma     Migraine headache     Cataract     Hyperlipidemia     Chest pain     Rectal bleed     Lower GI bleeding       Allergies:  No Known Allergies    Social History     Tobacco Use   Smoking Status Former Smoker     Last attempt to quit:      Years since quittin.6   Smokeless Tobacco Never Used       Review of systems otherwise negative except as listed in  HPI.   Social History     Tobacco Use   Smoking Status Former Smoker     Last attempt to quit:      Years since quittin.6   Smokeless Tobacco Never Used       OBJECTICE: /78   Pulse 88   Temp 98  F (36.7  C) (Oral)   Resp 28   Ht 5' (1.524 m)   Wt 125 lb 6 oz (56.9 kg)   SpO2 98%   BMI 24.49 kg/m      DATA REVIEWED:  Additional History from Old Records Summarized (2):   Radiology Tests Summarized or Ordered (1): Chest x-ray on 2019, no signs of acute disease.  Labs Reviewed or Ordered (1): troponin, BMP and CBC on 2019      GEN-alert,  in no apparent distress.  HEENT- neck is supple.  CV-regular rate and rhythm with no murmur.   RESP-lungs clear to auscultation, no wheezing today.  ABDOMEN- Soft , not tender.  EXTREM- No edema.  SKIN-normal        [facility]   2019

## 2021-05-31 NOTE — PROGRESS NOTES
Clinic Care Coordination Contact    Clinic Care Coordination Medication Education FOLLOW UP Visit    Patient presents for:  Medication education, Compliance monitoring and Medication reconciliation    Language: Mohawk    Communication: Illiterate    Accompanied by: by DIL    Patient Living Situation:  Independent with family    Primary Care Provider:  Adrien Cardoza MD    Barriers:  Language, Cultural, Poor insight into disease process and Multiple uncontrolled disease states    Medication List (see cited below): Patient presents with all ordered medications    Current Outpatient Medications   Medication Sig     acetaminophen (MAPAP EXTRA STRENGTH) 500 MG tablet TAKE 1 PILL BY MOUTH EVERY 6 HOURS AS NEEDED FOR PAIN     albuterol (PROVENTIL) 2.5 mg /3 mL (0.083 %) nebulizer solution Take 3 mL (2.5 mg total) by nebulization every 6 (six) hours as needed for wheezing.     albuterol (VENTOLIN HFA) 90 mcg/actuation inhaler Inhale 2 puffs every 6 (six) hours as needed for wheezing.     amitriptyline (ELAVIL) 10 MG tablet TAKE 1 TABLET (10 MG TOTAL) BY MOUTH AT BEDTIME.     aspirin (ASPIR-LOW) 81 MG EC tablet Take 1 tablet (81 mg total) by mouth daily.     aspirin 81 MG EC tablet TAKE 1 TABLET (81 MG TOTAL) BY MOUTH DAILY.     atorvastatin (LIPITOR) 80 MG tablet Take 1 tablet (80 mg total) by mouth at bedtime.     blood glucose meter (GLUCOMETER) Use 1 each As Directed 3 (three) times a day. Dispense glucometer brand per patient's insurance at pharmacy discretion.(E11.9)     blood glucose test strips Use 1 each As Directed 3 (three) times a day. Dispense brand per patient's insurance at pharmacy discretion.(E11.9)     calcium carbonate (CALCIUM CARBONATE) 300 mg (750 mg) Chew Chew as needed.     clopidogrel (PLAVIX) 75 mg tablet Take 1 tablet (75 mg total) by mouth daily.     cyanocobalamin 1000 MCG tablet Take 1,000 mcg by mouth daily.            doxylamine (UNISOM, DOXYLAMINE,) 25 mg tablet Take 1 tablet (25 mg total) by  mouth at bedtime as needed for sleep.     famotidine (PEPCID) 40 MG tablet TAKE 1 PILL (40 MG) BY MOUTH TWICE DAILY FOR STOMACH     generic lancets Use 1 each As Directed 3 (three) times a day. Dispense brand per patient's insurance at pharmacy discretion.(E11.9)     hydroCHLOROthiazide (MICROZIDE) 12.5 mg capsule Take 1 capsule (12.5 mg total) by mouth daily.     insulin aspart U-100 (NOVOLOG) 100 unit/mL injection Inject under the skin daily. Use per sliding scale < 150 - No insulin, 151-200 use 3 U, 201- 250 use 5 U,   251- 300 use 8 U     metFORMIN (GLUCOPHAGE) 500 MG tablet TAKE 1 TABLET (500 MG TOTAL) BY MOUTH 2 TIMES A DAY WITH MEALS FOR DIABETES     metoprolol tartrate (LOPRESSOR) 25 MG tablet Take 1 tablet (25 mg total) by mouth 2 (two) times a day.     mometasone-formoterol (DULERA) 200-5 mcg/actuation HFAA inhaler INHALE 2 PUFFS BY MOUTH TWO TIMES A DAY     montelukast (SINGULAIR) 10 mg tablet TAKE 1 PILL (10 MG TOTAL) BY MOUTH AT BEDTIME FOR ASTHMA     omeprazole (PRILOSEC) 20 MG capsule Take 1 capsule (20 mg total) by mouth daily before breakfast.     polyethylene glycol (MIRALAX) 17 gram packet Take 1 packet (17 g total) by mouth daily.     tiotropium (SPIRIVA) 18 mcg inhalation capsule Place 1 capsule (2 puffs total) into inhaler and inhale daily.           Compliance: 100%    Future Appointments   Date Time Provider Department Center   10/2/2019 11:00 AM Tamra Duong MD [facility]   10/15/2019 10:20 AM Adrien Cardoza MD [facility]       Action Plan  RN Will:  Monthly chart review- scheduled telephone on 9/11/19 at 11am.     Care Guide Will:  Care Guide Delegation      Nursing Notes:      1) Was seen by a pulmonologist on 7/9/19 - was prescribed Spiriva 2 puffs 18mcg inhaler daily - Daughter in law states it's not working - patient used Spiriva in the past. Has f/u appt with pulmonologist on 10/2/19 at 11am. Instructed patient to continue using Spiriva and to notify her  pulmonologist with next f/u appt on 10/2/19 if working or not. Patient verbalized understanding.     2) Cardiologist appt on 7/9/19 - had MRI -need to f/u in 1 yr.     3) Has home care nurse coming out weekly -[phone]     4) Concerns about decrease PCA hours. States they already appealed the process and someone is coming out on Monday. Instructed patient and DIL to address their concern about PCA hour decreased with PCA agency.     5) High ED visit risks due to poor understanding when to use ED vs to contact PCP with concerns even after several educations.

## 2021-05-31 NOTE — TELEPHONE ENCOUNTER
Refill Approved    Rx renewed per Medication Renewal Policy. Medication was last renewed on 6/11/19.    Summer Smith, [facility] Triage/Med Refill 8/14/2019     Requested Prescriptions   Pending Prescriptions Disp Refills     polyethylene glycol (GLYCOLAX) 17 gram/dose powder [Pharmacy Med Name: POLYETHYLENE GLYCOL 3350 PO PWDR] 510 g 1     Sig: MIX 17 GRAMS WITH LIQUID AND DRINK ONCE DAILY FOR CONSTIPATION       GI Medications Refill Protocol Passed - 8/13/2019 10:38 AM        Passed - PCP or prescribing provider visit in last 12 or next 3 months.     Last office visit with prescriber/PCP: 8/8/2019 Adrien Cradoza MD OR same dept: 8/8/2019 Adrien Cardoza MD OR same specialty: 8/8/2019 Adrien Cardoza MD  Last physical: 9/7/2018 Last MTM visit: 5/21/2019 Malu Muñoz, PharmD   Next visit within 3 mo: Visit date not found  Next physical within 3 mo: Visit date not found  Prescriber OR PCP: Adrien Cardoza MD  Last diagnosis associated with med order: 1. Hemorrhoids, unspecified hemorrhoid type  - polyethylene glycol (GLYCOLAX) 17 gram/dose powder [Pharmacy Med Name: POLYETHYLENE GLYCOL 3350 PO PWDR]; MIX 17 GRAMS WITH LIQUID AND DRINK ONCE DAILY FOR CONSTIPATION  Dispense: 510 g; Refill: 1    If protocol passes may refill for 12 months if within 3 months of last provider visit (or a total of 15 months).

## 2021-06-01 ENCOUNTER — COMMUNICATION - HEALTHEAST (OUTPATIENT)
Dept: ALLERGY | Facility: CLINIC | Age: 51
End: 2021-06-01

## 2021-06-01 VITALS — BODY MASS INDEX: 23.21 KG/M2 | WEIGHT: 131 LBS | HEIGHT: 63 IN

## 2021-06-01 VITALS — BODY MASS INDEX: 23.56 KG/M2 | WEIGHT: 133 LBS

## 2021-06-01 VITALS — HEIGHT: 61 IN | WEIGHT: 126.44 LBS | BODY MASS INDEX: 23.87 KG/M2

## 2021-06-01 VITALS — WEIGHT: 126.13 LBS | BODY MASS INDEX: 22.35 KG/M2 | HEIGHT: 63 IN

## 2021-06-01 VITALS — WEIGHT: 125.5 LBS | HEIGHT: 61 IN | BODY MASS INDEX: 23.7 KG/M2

## 2021-06-01 VITALS — HEIGHT: 63 IN | BODY MASS INDEX: 22.25 KG/M2 | WEIGHT: 125.56 LBS

## 2021-06-01 VITALS — HEIGHT: 63 IN | WEIGHT: 131.38 LBS | BODY MASS INDEX: 23.28 KG/M2

## 2021-06-01 VITALS — HEIGHT: 61 IN | WEIGHT: 125.5 LBS | BODY MASS INDEX: 23.7 KG/M2

## 2021-06-01 VITALS — WEIGHT: 127 LBS | BODY MASS INDEX: 23.98 KG/M2 | HEIGHT: 61 IN

## 2021-06-01 VITALS — WEIGHT: 125.13 LBS | HEIGHT: 61 IN | BODY MASS INDEX: 23.63 KG/M2

## 2021-06-01 VITALS — BODY MASS INDEX: 23.91 KG/M2 | WEIGHT: 135 LBS

## 2021-06-01 VITALS — BODY MASS INDEX: 23.38 KG/M2 | WEIGHT: 132 LBS

## 2021-06-01 VITALS — BODY MASS INDEX: 24.96 KG/M2 | HEIGHT: 60 IN | WEIGHT: 127.13 LBS

## 2021-06-01 VITALS — WEIGHT: 124.5 LBS | BODY MASS INDEX: 22.05 KG/M2

## 2021-06-01 VITALS — WEIGHT: 127 LBS | BODY MASS INDEX: 22.5 KG/M2

## 2021-06-01 VITALS — WEIGHT: 130 LBS | BODY MASS INDEX: 23.03 KG/M2

## 2021-06-01 NOTE — PROGRESS NOTES
Interp: [id number]    Reminder provided about 3 upcoming appointments for Kulwant.    Today 2:20pm at [facility] with Dr. Cardoza    Tomorrow 10/2 11am at [facility]    Thursday 10/3 8:10am at [facility]        Next Outreach: 11/1/19    Plan:     - Any appointment reminders needed?   - Any new goals?

## 2021-06-01 NOTE — PROGRESS NOTES
Clinic Care Coordination Contact    Follow Up Progress Note      Assessment: post ED f/u   Patient went to ED on 9/22/19 for lower ABD pain- Definitive cause of ABD was not identified. Patient was instructed to take Tylenol and Ibuprofen for pain and to see PCP   DIL states no concerns.     Goals addressed this encounter:   Goals Addressed    None          Intervention/Education provided during outreach:           Plan:   1) CCC RN will continue to do monthly outreach calls due to frequent visit to ED.

## 2021-06-01 NOTE — PROGRESS NOTES
ASSESMENT AND PLAN:  Diagnoses and all orders for this visit:    Moderate persistent asthma with acute exacerbation  -     azithromycin (ZITHROMAX Z-CLIFFORD) 250 MG tablet; 2 tabs (500 mg ) day #1, then 1 tab (250 mg) days #2-5, total 5 days  Dispense: 6 tablet; Refill: 0  -     predniSONE (DELTASONE) 20 MG tablet; Take 20 mg by mouth 2 (two) times a day for 5 days.  Dispense: 10 tablet; Refill: 0  Discussed chest x-ray, patient elected to defer and try medications first.  She has routine follow-up appointment scheduled in 3 weeks, she will keep that appointment.    Type 2 diabetes mellitus treated without insulin (H)  Discussed steroid side effects including elevated blood sugar.  She will use sliding scale insulin if needed.  No new prescription needed.    This transcription uses voice recognition software, which may contain typographical errors.      SUBJECTIVE: Kulwant Amin is a 49-year-old female with history of multiple medical conditions including but not limited to asthma, diabetes and CAD here with 2 days history of worsening cough, wheezing and shortness of breath.  She denied fever.  Cough is nonproductive.  She usually gets worsening asthma symptoms in the winter months.  No acute chest pain.  No palpitations.  She had a Holter monitor through cardiology last month, was told it was normal per daughter-in-law.    Past Medical History:   Diagnosis Date     Acute blood loss anemia      Anxiety      Arthritis      Asthma      Chest wall pain 12/26/2017     COPD (chronic obstructive pulmonary disease) (H)      Depression      Diabetes mellitus (H)      Diabetes mellitus, type II (H)      Generalized headaches      GERD (gastroesophageal reflux disease)      High cholesterol      History of anesthesia complications     N/V     HTN (hypertension)      Lactic acid acidosis 11/23/2018     Pneumonia      SVT (supraventricular tachycardia) (H)      Unstable angina (H) 10/5/2018     Patient Active Problem List    Diagnosis     HTN (hypertension)     GERD (gastroesophageal reflux disease)     SVT (supraventricular tachycardia) (H)     CAD (coronary artery disease)     Type 2 diabetes mellitus treated without insulin (H)     S/P coronary artery stent placement     Moderate persistent asthma     Migraine headache     Cataract     Hyperlipidemia     Chest pain     Rectal bleed     Lower GI bleeding       Allergies:  No Known Allergies    Social History     Tobacco Use   Smoking Status Former Smoker     Last attempt to quit:      Years since quittin.6   Smokeless Tobacco Never Used       Review of systems otherwise negative except as listed in HPI.   Social History     Tobacco Use   Smoking Status Former Smoker     Last attempt to quit:      Years since quittin.6   Smokeless Tobacco Never Used       OBJECTICE: /68 (Patient Site: Right Arm, Patient Position: Sitting, Cuff Size: Adult Regular)   Pulse 96   Temp 98  F (36.7  C) (Oral)   Resp 28   Ht 5' (1.524 m)   Wt 126 lb 7 oz (57.4 kg)   SpO2 99%   BMI 24.69 kg/m            GEN-alert,  in no apparent distress.  HEENT- neck is supple.  CV-regular rate and rhythm with no murmur.   RESP-l wheezing both lung fields with no crackles or rhonchi.  ABDOMEN- Soft , not tender.  SKIN-normal        [facility]   2019

## 2021-06-01 NOTE — PROGRESS NOTES
ASSESMENT AND PLAN:  Diagnoses and all orders for this visit:    Lower abdominal pain  -     Ambulatory referral to Gastroenterology  -     Culture, Urine  UA was unremarkable in the ED.  Recheck urine culture.    Blood in stool  -     Ambulatory referral to Gastroenterology  Referral to GI due to abdominal pain and blood in stool for evaluation and treatment.    Type 2 diabetes mellitus treated without insulin (H)  -     Glycosylated Hemoglobin A1c  -     [facility] RED  A1c 6.1 today.  Continue current medication.    Moderate persistent asthma, unspecified whether complicated  Has appointment with pulmonology tomorrow.  Instructed to keep that appointment.    This transcription uses voice recognition software, which may contain typographical errors.    SUBJECTIVE: Kulwant Amin is a 49-year-old female with history of diabetes, asthma, CAD,/P coronary artery stent placement, hypertension, GERD and others here for ED follow-up.  She was seen in the ED on 9/21/2019 due to lower abdominal pain.  CT abdomen pelvis was unremarkable.  BMP, CBC, lipase and hepatic profiles were normal.  She denied chills or fever.  Denied nausea or vomiting.  Denied blood in the urine, dysuria or frequency.    States that abdominal pain is improving but still having cramping/burning sensation in her lower extremities.  She has been having these symptoms for about a month now.  She also states that she has been having blood in the stool for on and off for a year.  She never mentioned this to any healthcare providers in the past.  It is bright red blood, wipe type.  No pain with defecation.    Past Medical History:   Diagnosis Date     Acute blood loss anemia      Anxiety      Arthritis      Asthma      Chest wall pain 12/26/2017     COPD (chronic obstructive pulmonary disease) (H)      Depression      Diabetes mellitus (H)      Diabetes mellitus, type II (H)      Generalized headaches      GERD (gastroesophageal reflux disease)      High  cholesterol      History of anesthesia complications     N/V     HTN (hypertension)      Lactic acid acidosis 2018     Pneumonia      SVT (supraventricular tachycardia) (H)      Unstable angina (H) 10/5/2018     Patient Active Problem List   Diagnosis     HTN (hypertension)     GERD (gastroesophageal reflux disease)     SVT (supraventricular tachycardia) (H)     CAD (coronary artery disease)     Type 2 diabetes mellitus treated without insulin (H)     S/P coronary artery stent placement     Moderate persistent asthma     Migraine headache     Cataract     Hyperlipidemia     Chest pain     Rectal bleed     Lower GI bleeding     Blood in stool       Allergies:  No Known Allergies    Social History     Tobacco Use   Smoking Status Former Smoker     Last attempt to quit:      Years since quittin.7   Smokeless Tobacco Never Used       Review of systems otherwise negative except as listed in HPI.   Social History     Tobacco Use   Smoking Status Former Smoker     Last attempt to quit:      Years since quittin.7   Smokeless Tobacco Never Used       OBJECTICE: /78   Pulse 82   Temp 98  F (36.7  C) (Oral)   Resp 28   Ht 5' (1.524 m)   Wt 125 lb 2 oz (56.8 kg)   SpO2 98%   BMI 24.44 kg/m            GEN-alert,  in no apparent distress.  HEENT-mucous membranes are moist, neck is supple.  CV-regular rate and rhythm with no murmur.   RESP-lungs-mild wheezing both lung fields, no crackles or rhonchi.  ABDOMEN- Soft , mild lower abdominal tenderness.  No rebound or guarding.  SKIN-normal        [facility]   10/1/2019

## 2021-06-01 NOTE — PATIENT INSTRUCTIONS - HE
1. If Breo is affordable, I will switch you from Dulera to Breo; you will then STOP taking Dulera.  2. Please use your Breo inhaler once a day every day whether or not you are short of breath, this is not a rescue inhaler so do not use this if you are acutely short of breath.  3. Please be sure to gargle your mouth after using your Breo inhaler.  4. Please use your Spiriva once a day every day whether or not you are short of breath, this is not a rescue inhaler so do not use this if you are acutely short of breath.  5. Please use your albuterol inhaler 2 puffs/ or the nebulizer up to 4 times a day for rescue. If you are not short of breath, you do not need to use this.    I have given you a prescription for 5 days of prednisone at 40mg a day. Let me know if this helps your symptoms.

## 2021-06-01 NOTE — PROGRESS NOTES
Patient instructed in use of Breo ellipta inhaler.   present.  Patient states good understanding of how to use the ellipta device.  Phone numbers given to call with any questions.

## 2021-06-01 NOTE — TELEPHONE ENCOUNTER
RN cannot approve Refill Request    RN can NOT refill this medication PCP messaged that patient is overdue for Labs. Last office visit: 9/5/2019 Adrien Cardoza MD Last Physical: 9/7/2018 Last MTM visit: 5/21/2019 Malu Muñoz, PharmD Last visit same specialty: 9/5/2019 Adrien Cardoza MD.  Next visit within 3 mo: Visit date not found  Next physical within 3 mo: Visit date not found      Manuel Cazares, [facility] Triage/Med Refill 9/5/2019    Requested Prescriptions   Pending Prescriptions Disp Refills     insulin aspart U-100 (NOVOLOG FLEXPEN U-100 INSULIN) 100 unit/mL (3 mL) injection pen [Pharmacy Med Name: NOVOLOG FLEXPEN SYRINGE 100 SOLN] 15 mL 0     Sig: USE PER SLIDING SCALE < 150 - NO INSULIN, 151-200 USE 3 UNITS, 201- 250 USE 5 UNITS, 251- 300 USE 8 UNITS **63 DAY SUPPLY**       Insulin/GLP-1 Refill Protocol Failed - 9/5/2019  1:28 PM        Failed - Microalbumin in last year     Microalbumin, Random Urine   Date Value Ref Range Status   08/09/2018 <0.50 0.00 - 1.99 mg/dL Final                  Passed - Visit with PCP or prescribing provider visit in last 6 months     Last office visit with prescriber/PCP: 9/5/2019 OR same dept: 9/5/2019 Adrien Cardoza MD OR same specialty: 9/5/2019 Adrien Cardoza MD Last physical: Visit date not found Last MTM visit: 5/21/2019 Malu Muñoz, PharmD     Next appt within 3 mo: Visit date not found  Next physical within 3 mo: Visit date not found  Prescriber OR PCP: Adrien Cardoza MD  Last diagnosis associated with med order: 1. Type 2 diabetes mellitus treated without insulin (H)  - NOVOLOG FLEXPEN U-100 INSULIN 100 unit/mL (3 mL) injection pen [Pharmacy Med Name: NOVOLOG FLEXPEN SYRINGE 100 SOLN]; USE PER SLIDING SCALE < 150 - NO INSULIN, 151-200 USE 3 UNITS, 201- 250 USE 5 UNITS, 251- 300 USE 8 UNITS **63 DAY SUPPLY**  Dispense: 15 mL; Refill: 0    If protocol passes may refill for 6 months if within 3 months of last provider visit (or a total of 9 months).               Passed - A1C in last 6 months     Hemoglobin A1c   Date Value Ref Range Status   07/11/2019 6.5 (H) 3.5 - 6.0 % Final               Passed - Blood pressure in last year     BP Readings from Last 1 Encounters:   09/05/19 106/68             Passed - Creatinine done in last year     Creatinine   Date Value Ref Range Status   08/02/2019 0.76 0.60 - 1.10 mg/dL Final

## 2021-06-01 NOTE — PROGRESS NOTES
Clinic Care Coordination Contact    Situation: Patient chart reviewed by care coordinator.    Background: monthly chart review        Assessment:   Chart review completed today.   Patient was last seen by PCP on 9/5/19 was prescribed Z-pack for asthma exacerbation.   Patient went to ED on 8/2/19 for shortness of breath - states her home care nurse came to see her and was concern so asked her to be seen at ED for shortness of breath.   Daughter in law states no concerns or questions at this time.     A1c - 6.5 on 7/11/19- Metformin 500mg (1) tab two times a day      Plan/Recommendations:   1) Will continue to do monthly chart review.  2) Patient will attend her appt with pulmonologist on 10/2/19   3) Will attend appt with cardiologist on 10/3/19 for increase heart rate   4) f/u appt with PCP on 10/15/19

## 2021-06-01 NOTE — TELEPHONE ENCOUNTER
Prior Authorization Request  Who s requesting:  Pharmacy  Pharmacy Name and Location: [facility]  Medication Name: Breo ellipta 200-25mcg  Insurance Plan: Medicaid [place], [facility]  Insurance Member ID Number:  [id number]  Informed patient that prior authorizations can take up to 10 business days for response:   No  Okay to leave a detailed message: No    Patient has tried and failed Dulera.

## 2021-06-01 NOTE — TELEPHONE ENCOUNTER
Central PA team  [phone]  Pool: HE PA MED ([id number])          PA has been initiated.       PA form completed and faxed insurance via Cover My Meds     Key:  [id number]     Medication:  BREO ELLIPTA    Insurance:  [employer]        Response will be received via fax and may take up to 5-10 business days depending on plan

## 2021-06-01 NOTE — PROGRESS NOTES
Assessment/Plan:        There are no diagnoses linked to this encounter.  48-year-old woman with history of organic fuel exposure in the home.  Her PFTs have been previously noted not to be diagnostic due to poor effort and she presents with worsening cough and wheezing on exam consistent with an exacerbation of some obstructive pulmonary disease. For the present we would recommend;    Prednisone 40mg daily for 5 days.     I have ordered a Breo Ellipta inhaler for her to use at the high dose and instructed her on the technique and also on the fact that she needs to rinse her mouth out after using this.  Her daughter is here with her and I have told her through the  that if this inhaler is prohibitively expensive they should not pick this up and I will look for some other alternative.    Continue Spiriva daily.    Reminded her that she could use her albuterol up to 4 times a day for rescue.    I explained to her and her daughter in law who is here with her today that if her symptoms did not improve, that she should call us.    Tamra Scruggsqvi  Pulmonary and Critical Care  [id number]      Subjective:    Patient ID: Kulwant Amin is a 49 y.o. female.    Ms. Amin is a 49 y.o.female with a past medical history significant for anxiety, arthritis, questionable asthma versus COPD, diabetes, hypertension and a prior history of SVT presents with a follow-up visit for her pulmonary complaints.  At her previous clinic visit, she had been complaining of worsening shortness of breath with wheezing; she has had been compliant with her Dulera and Spiriva inhaler as before is only using the albuterol once a day.  She thinks that she feels worse compared to her last clinic visit and continues to endorse chest discomfort, shortness of breath, wheezing which is unrelated to activity.  She has some sweating but it is not entirely clear if this is related to hot flashes.  She has noted that she symptoms improved with use of her  short acting inhaler.  Her ACT is 1+ 1+2+2+1=7      Pertinent past medical history:  48-year-old female here for follow-up.  Her breathing is a bit worse than 6 months ago.  She had multiple ER visits and evaluations.  This includes negative PE study.  She had a cath with a 75% LAD lesion which was intervened upon.  Since her catheterization she feels she has more heartburn.  Her breathing is worse with exertion.  Improves with rest.  It is also worse with cold weather.  Warm weather and humid air does not bother.  Symptoms localized to the chest.  Pertinent positives include burning sensation in the chest.  Pertinent negatives include no fever or hemoptysis.    She is relatively inactive in the home.  She does report doing her cardiac rehab.  She tolerated this okay.    Review of Systems  Pertinent as noted in HPI.        Objective:    Physical Exam   Constitutional: She is oriented to person, place, and time. No distress.   Appears older than stated age.  Fatigue.   HENT:   Head: Normocephalic and atraumatic.   Nose: Nose normal.   Eyes: Pupils are equal, round, and reactive to light. Conjunctivae are normal. Right eye exhibits no discharge. Left eye exhibits no discharge. No scleral icterus.   Neck: Normal range of motion. No tracheal deviation present. No thyromegaly present.   Cardiovascular: Normal rate, regular rhythm and normal heart sounds. Exam reveals no gallop.   No murmur heard.  Pulmonary/Chest: Effort normal. No stridor. No respiratory distress. She has wheezes.   Lymphadenopathy:     She has no cervical adenopathy.   Neurological: She is alert and oriented to person, place, and time.   Skin: Skin is warm and dry. She is not diaphoretic. No erythema.   Psychiatric: Her behavior is normal. Thought content normal.   Depressed affect   Nursing note and vitals reviewed.          Current Outpatient Medications on File Prior to Visit   Medication Sig Dispense Refill     acetaminophen (MAPAP EXTRA STRENGTH)  500 MG tablet TAKE 1 PILL BY MOUTH EVERY 6 HOURS AS NEEDED FOR PAIN 90 tablet 3     albuterol (PROVENTIL) 2.5 mg /3 mL (0.083 %) nebulizer solution Take 3 mL (2.5 mg total) by nebulization every 6 (six) hours as needed for wheezing. 150 mL 12     albuterol (VENTOLIN HFA) 90 mcg/actuation inhaler Inhale 2 puffs every 6 (six) hours as needed for wheezing. 1 Inhaler 3     amitriptyline (ELAVIL) 10 MG tablet Take 1 tablet (10 mg total) by mouth at bedtime. 30 tablet 10     aspirin (ASPIR-LOW) 81 MG EC tablet Take 1 tablet (81 mg total) by mouth daily. 90 tablet 2     atorvastatin (LIPITOR) 80 MG tablet Take 1 tablet (80 mg total) by mouth at bedtime. 90 tablet 3     blood glucose meter (GLUCOMETER) Use 1 each As Directed 3 (three) times a day. Dispense glucometer brand per patient's insurance at pharmacy discretion.(E11.9) 1 each 0     blood glucose test strips Use 1 each As Directed 3 (three) times a day. Dispense brand per patient's insurance at pharmacy discretion.(E11.9) 300 strip 3     calcium carbonate (CALCIUM CARBONATE) 300 mg (750 mg) Chew Chew as needed.       clopidogrel (PLAVIX) 75 mg tablet TAKE 1 TABLET (75 MG TOTAL) BY MOUTH DAILY. 90 tablet 1     cyanocobalamin 1000 MCG tablet Take 1,000 mcg by mouth daily.              doxylamine (UNISOM, DOXYLAMINE,) 25 mg tablet Take 1 tablet (25 mg total) by mouth at bedtime as needed for sleep. 30 tablet 1     famotidine (PEPCID) 40 MG tablet TAKE 1 PILL (40 MG) BY MOUTH TWICE DAILY FOR STOMACH 60 tablet 10     generic lancets Use 1 each As Directed 3 (three) times a day. Dispense brand per patient's insurance at pharmacy discretion.(E11.9) 300 each 3     hydroCHLOROthiazide (MICROZIDE) 12.5 mg capsule Take 1 capsule (12.5 mg total) by mouth daily. 90 capsule 2     insulin aspart U-100 (NOVOLOG) 100 unit/mL injection Inject under the skin daily. Use per sliding scale < 150 - No insulin, 151-200 use 3 U, 201- 250 use 5 U,   251- 300 use 8 U       metFORMIN  (GLUCOPHAGE) 500 MG tablet TAKE 1 TABLET (500 MG TOTAL) BY MOUTH 2 TIMES A DAY WITH MEALS FOR DIABETES 60 tablet 2     metoprolol tartrate (LOPRESSOR) 25 MG tablet Take 1 tablet (25 mg total) by mouth 2 (two) times a day. 180 tablet 1     mometasone-formoterol (DULERA) 200-5 mcg/actuation HFAA inhaler INHALE 2 PUFFS BY MOUTH TWO TIMES A DAY 26 g 11     montelukast (SINGULAIR) 10 mg tablet TAKE 1 PILL (10 MG TOTAL) BY MOUTH AT BEDTIME FOR ASTHMA 30 tablet 10     omeprazole (PRILOSEC) 20 MG capsule Take 1 capsule (20 mg total) by mouth daily before breakfast. 90 capsule 1     polyethylene glycol (GLYCOLAX) 17 gram/dose powder MIX 17 GRAMS WITH LIQUID AND DRINK ONCE DAILY FOR CONSTIPATION 510 g 12     tiotropium (SPIRIVA) 18 mcg inhalation capsule Place 1 capsule (2 puffs total) into inhaler and inhale daily. 30 capsule 11     No current facility-administered medications on file prior to visit.      /84   Pulse 84   Resp 24   Wt 125 lb (56.7 kg)   SpO2 96% Comment: RA  BMI 24.41 kg/m      Medical History  Active Ambulatory (Non-Hospital) Problems    Diagnosis     Blood in stool     Lower GI bleeding     Chest pain     Rectal bleed     Hyperlipidemia     Cataract     Moderate persistent asthma     Migraine headache     S/P coronary artery stent placement     Type 2 diabetes mellitus treated without insulin (H)     CAD (coronary artery disease)     SVT (supraventricular tachycardia) (H)     GERD (gastroesophageal reflux disease)     HTN (hypertension)     Past Medical History:   Diagnosis Date     Acute blood loss anemia      Anxiety      Arthritis      Asthma      Chest wall pain 12/26/2017     COPD (chronic obstructive pulmonary disease) (H)      Depression      Diabetes mellitus (H)      Diabetes mellitus, type II (H)      Generalized headaches      GERD (gastroesophageal reflux disease)      High cholesterol      History of anesthesia complications      HTN (hypertension)      Lactic acid acidosis  11/23/2018     Pneumonia      SVT (supraventricular tachycardia) (H)      Unstable angina (H) 10/5/2018        Surgical History  She  has a past surgical history that includes Coronary Angiogram (N/A, 1/25/2018); pr colonoscopy flx dx w/collj spec when pfrmd (N/A, 12/10/2018); pr esophagogastroduodenoscopy transoral diagnostic (N/A, 12/10/2018); and Coronary stent placement.       Social History  Reviewed, she lives at home with her family.   Allergies  No Known Allergies                             Data Review - imaging, labs, and ekgs listed below were reviewed by me.  Chest XRay and chest CT images and EKG tracings interpreted personally.     Past Labs  Office Visit on 10/01/2019   Component Date Value     Culture 10/01/2019 No Growth      Hemoglobin A1c 10/01/2019 6.1*   Admission on 09/21/2019, Discharged on 09/22/2019   Component Date Value     POC Preg, Urine 09/21/2019 Negative      POCT Kit Lot Number 09/21/2019 [id number]      POCT Kit Expiration Date 09/21/2019 2020-12-31      Pos Control 09/21/2019 Valid Control      Neg Control 09/21/2019 Valid Control      Color, UA 09/21/2019 Yellow      Clarity, UA 09/21/2019 Clear      Glucose, UA 09/21/2019 Negative      Bilirubin, UA 09/21/2019 Negative      Ketones, UA 09/21/2019 Negative      Specific Gravity, UA 09/21/2019 1.004      Blood, UA 09/21/2019 Negative      pH, UA 09/21/2019 5.0      Protein, UA 09/21/2019 Negative      Urobilinogen, UA 09/21/2019 <2.0 E.U./dL      Nitrite, UA 09/21/2019 Negative      Leukocytes, UA 09/21/2019 Moderate*     Bacteria, UA 09/21/2019 None Seen      RBC, UA 09/21/2019 0-2      WBC, UA 09/21/2019 0-5      Squam Epithel, UA 09/21/2019 10-25*     Mucus, UA 09/21/2019 Few*     WBC 09/21/2019 10.5      RBC 09/21/2019 4.98      Hemoglobin 09/21/2019 11.6*     Hematocrit 09/21/2019 38.9      MCV 09/21/2019 78*     MCH 09/21/2019 23.3*     MCHC 09/21/2019 29.8*     RDW 09/21/2019 17.1*     Platelets 09/21/2019 217      MPV  09/21/2019 12.6*     Sodium 09/21/2019 138      Potassium 09/21/2019 3.6      Chloride 09/21/2019 102      CO2 09/21/2019 24      Anion Gap, Calculation 09/21/2019 12      Glucose 09/21/2019 106      Calcium 09/21/2019 9.7      BUN 09/21/2019 7*     Creatinine 09/21/2019 0.73      GFR MDRD Af Amer 09/21/2019 >60      GFR MDRD Non Af Amer 09/21/2019 >60      Bilirubin, Total 09/21/2019 0.6      Bilirubin, Direct 09/21/2019 0.2      Protein, Total 09/21/2019 7.4      Albumin 09/21/2019 3.8      Alkaline Phosphatase 09/21/2019 111      AST 09/21/2019 21      ALT 09/21/2019 20      Lipase 09/21/2019 22      CT chest images reviewed and interpreted by me: No pulmonary emboli.  Bring him is essentially clear.    Chest x-ray images reviewed and interpreted by me: Lungs essentially clear.  Ct Abdomen Pelvis Without Oral With Iv Contrast    Result Date: 9/22/2019  EXAM: CT ABDOMEN PELVIS WO ORAL W IV CONTRAST LOCATION: [facility] DATE/TIME: 9/22/2019 12:26 AM INDICATION: Abdominal Pain COMPARISON: CT abdomen and pelvis 12/07/2018 TECHNIQUE: Helical enhanced thin-section CT scan of the abdomen and pelvis was performed following injection of IV contrast. Multiplanar reformats were obtained. Dose reduction techniques were used. CONTRAST: Iohexol (Omni) 100 mL FINDINGS: LUNG BASES: Minimal scarring or atelectasis right lower lobe and lingula. ABDOMEN: Mild fatty infiltration of the liver. The spleen, pancreas, adrenal glands, gallbladder and kidneys are normal. No adenopathy. Small duodenal diverticulum. PELVIS: No adenopathy or ascites. No evidence of bowel obstruction. Normal appendix. MUSCULOSKELETAL: Negative.     CONCLUSION: 1.  No findings to account for the patient's abdominal pain.     Cardiac catheterization report reviewed by me: LAD lesion status post stent.

## 2021-06-02 VITALS
WEIGHT: 128.25 LBS | BODY MASS INDEX: 21.89 KG/M2 | HEIGHT: 64 IN | BODY MASS INDEX: 21.51 KG/M2 | HEIGHT: 64 IN | WEIGHT: 126 LBS

## 2021-06-02 VITALS — WEIGHT: 130.38 LBS | HEIGHT: 64 IN | BODY MASS INDEX: 22.26 KG/M2

## 2021-06-02 VITALS — HEIGHT: 63 IN | BODY MASS INDEX: 22.46 KG/M2 | WEIGHT: 126.75 LBS

## 2021-06-02 VITALS — HEIGHT: 61 IN | WEIGHT: 127 LBS | BODY MASS INDEX: 23.98 KG/M2

## 2021-06-02 VITALS — BODY MASS INDEX: 21.93 KG/M2 | WEIGHT: 128.44 LBS | HEIGHT: 64 IN

## 2021-06-02 VITALS — HEIGHT: 63 IN | WEIGHT: 128.56 LBS | BODY MASS INDEX: 22.78 KG/M2

## 2021-06-02 VITALS — BODY MASS INDEX: 22.87 KG/M2 | HEIGHT: 63 IN | WEIGHT: 129.06 LBS

## 2021-06-02 VITALS — WEIGHT: 128.56 LBS | HEIGHT: 61 IN | BODY MASS INDEX: 24.27 KG/M2

## 2021-06-02 VITALS — WEIGHT: 130 LBS | BODY MASS INDEX: 22.31 KG/M2

## 2021-06-02 VITALS — WEIGHT: 125 LBS | HEIGHT: 64 IN | BODY MASS INDEX: 21.34 KG/M2

## 2021-06-02 VITALS — HEIGHT: 63 IN | BODY MASS INDEX: 22.68 KG/M2 | WEIGHT: 128 LBS

## 2021-06-02 VITALS — HEIGHT: 64 IN | BODY MASS INDEX: 22.2 KG/M2 | WEIGHT: 130 LBS

## 2021-06-02 VITALS — BODY MASS INDEX: 22.1 KG/M2 | WEIGHT: 129.44 LBS | HEIGHT: 64 IN

## 2021-06-02 VITALS — HEIGHT: 63 IN | WEIGHT: 129.38 LBS | BODY MASS INDEX: 22.93 KG/M2

## 2021-06-02 VITALS — HEIGHT: 64 IN | BODY MASS INDEX: 22.02 KG/M2 | WEIGHT: 129 LBS

## 2021-06-02 VITALS — HEIGHT: 64 IN | BODY MASS INDEX: 21.59 KG/M2 | WEIGHT: 126.44 LBS

## 2021-06-02 NOTE — TELEPHONE ENCOUNTER
Additional questions were asked over the phone, additional information was requested to be faxed as well. Faxed to [phone]. Case# [id number]

## 2021-06-02 NOTE — PROGRESS NOTES
Interp: [id number]      Completed Pulmonology goal as she did attend her October appointment as scheduled.    She has a f/u in November with the Pulmonologist.    CHW will route to CCC RN to review this chart for any additional CHW goals.

## 2021-06-02 NOTE — PROGRESS NOTES
"ASSESMENT AND PLAN:  Diagnoses and all orders for this visit:    Type 2 diabetes mellitus treated without insulin (H)  Last A1c 6.1.  No medication changes.  Instructed to bring blood sugar log next time.    Moderate persistent asthma with acute exacerbation  Managed by pulmonology.  Appreciate help.  Follow-up appointment scheduled on 11/21/2019.  Instructed to keep that appointment.    Cough  -     codeine-guaiFENesin (GUAIFENESIN AC)  mg/5 mL liquid; Take 5-10 mL by mouth 2 (two) times a day as needed for cough.  Dispense: 100 mL; Refill: 0  Patient requests \" strong cough medicine\".  Given codeine cough syrup, instructed to take it only twice a day.  Potential side effects discussed including drowsiness.  Instructed to keep follow-up appointment with pulmonology.    Lower abdominal pain  Chronic ongoing lower abdominal pain.  Multiple CT scans in the past.  She was referred to GI.  GI appointment was scheduled on 10/8/2019 but patient rescheduled it for 11/8/2019.  Instructed to keep that appointment.      SUBJECTIVE: Kulwant Amin is a 49-year-old female with history of hypertension, diabetes, SVT, persistent asthma and others here for follow-up.    She takes metformin 500 mg twice a day.  Daughter-in-law is monitoring her blood sugar, she forgot to bring her blood sugar log today but states it usually in the 110s to 120s most day.  Blood sugar was not elevated while she was on oral steroid few weeks ago.  No low blood sugar with hypoglycemic symptoms since last visit.    She is tolerating her blood pressure medications well.  No acute headaches, acute vision changes or weaknesses.      She was recently seen by cardiology 2 weeks ago, recommended 1 year follow-up.    She sees pulmonology for asthma.  Last seen a few weeks ago, Dulera was discontinued and started on  Breo, follow-up appointment scheduled in 2 weeks.  She was also given oral steroid for 5 days.  She is still complaining of mild cough but " not worsening since pulmonology visit.    Past Medical History:   Diagnosis Date     Acute blood loss anemia      Anxiety      Arthritis      Asthma      Chest wall pain 2017     COPD (chronic obstructive pulmonary disease) (H)      Depression      Diabetes mellitus (H)      Diabetes mellitus, type II (H)      Generalized headaches      GERD (gastroesophageal reflux disease)      High cholesterol      History of anesthesia complications     N/V     HTN (hypertension)      Lactic acid acidosis 2018     Pneumonia      SVT (supraventricular tachycardia) (H)      Unstable angina (H) 10/5/2018     Patient Active Problem List   Diagnosis     HTN (hypertension)     GERD (gastroesophageal reflux disease)     SVT (supraventricular tachycardia) (H)     CAD (coronary artery disease)     Type 2 diabetes mellitus treated without insulin (H)     S/P coronary artery stent placement     Moderate persistent asthma     Migraine headache     Cataract     Hyperlipidemia     Chest pain     Rectal bleed     Lower GI bleeding     Blood in stool       Allergies:  No Known Allergies    Social History     Tobacco Use   Smoking Status Former Smoker     Last attempt to quit:      Years since quittin.7   Smokeless Tobacco Never Used       Review of systems otherwise negative except as listed in HPI.   Social History     Tobacco Use   Smoking Status Former Smoker     Last attempt to quit:      Years since quittin.7   Smokeless Tobacco Never Used       OBJECTICE: /64 (Patient Site: Right Arm, Patient Position: Sitting, Cuff Size: Adult Regular)   Pulse 76   Temp 98.5  F (36.9  C) (Oral)   Resp 20   Ht 5' (1.524 m)   Wt 129 lb 6 oz (58.7 kg)   SpO2 98%   BMI 25.27 kg/m      DATA REVIEWED:    Labs Reviewed or Ordered (1):      GEN-alert,  in no apparent distress.  HEENT-mucous membranes are moist, neck is supple.  CV-regular rate and rhythm with no murmur.   RESP-mild wheezing, no changes since last  exam.  ABDOMEN- Soft , mild generalized tenderness, no rebound or guarding.  No palpable masses.  SKIN-normal        [facility]   10/15/2019

## 2021-06-03 ENCOUNTER — COMMUNICATION - HEALTHEAST (OUTPATIENT)
Dept: CARDIOLOGY | Facility: CLINIC | Age: 51
End: 2021-06-03

## 2021-06-03 VITALS
HEART RATE: 76 BPM | TEMPERATURE: 98.5 F | OXYGEN SATURATION: 98 % | HEIGHT: 60 IN | SYSTOLIC BLOOD PRESSURE: 106 MMHG | BODY MASS INDEX: 25.4 KG/M2 | RESPIRATION RATE: 20 BRPM | DIASTOLIC BLOOD PRESSURE: 64 MMHG | WEIGHT: 129.38 LBS

## 2021-06-03 VITALS
HEIGHT: 64 IN | BODY MASS INDEX: 21.54 KG/M2 | WEIGHT: 126.19 LBS | SYSTOLIC BLOOD PRESSURE: 118 MMHG | OXYGEN SATURATION: 98 % | DIASTOLIC BLOOD PRESSURE: 72 MMHG | HEART RATE: 77 BPM | TEMPERATURE: 98.3 F

## 2021-06-03 VITALS
WEIGHT: 123 LBS | BODY MASS INDEX: 24.15 KG/M2 | HEIGHT: 60 IN | HEART RATE: 84 BPM | RESPIRATION RATE: 24 BRPM | SYSTOLIC BLOOD PRESSURE: 112 MMHG | DIASTOLIC BLOOD PRESSURE: 82 MMHG

## 2021-06-03 VITALS
RESPIRATION RATE: 24 BRPM | HEART RATE: 80 BPM | OXYGEN SATURATION: 100 % | DIASTOLIC BLOOD PRESSURE: 68 MMHG | WEIGHT: 126.6 LBS | SYSTOLIC BLOOD PRESSURE: 112 MMHG | BODY MASS INDEX: 21.73 KG/M2

## 2021-06-03 VITALS
OXYGEN SATURATION: 99 % | HEIGHT: 60 IN | HEART RATE: 96 BPM | WEIGHT: 126.44 LBS | RESPIRATION RATE: 28 BRPM | TEMPERATURE: 98 F | DIASTOLIC BLOOD PRESSURE: 68 MMHG | BODY MASS INDEX: 24.82 KG/M2 | SYSTOLIC BLOOD PRESSURE: 106 MMHG

## 2021-06-03 VITALS
OXYGEN SATURATION: 96 % | DIASTOLIC BLOOD PRESSURE: 84 MMHG | RESPIRATION RATE: 24 BRPM | WEIGHT: 125 LBS | HEART RATE: 84 BPM | BODY MASS INDEX: 24.41 KG/M2 | SYSTOLIC BLOOD PRESSURE: 124 MMHG

## 2021-06-03 VITALS
TEMPERATURE: 98.3 F | OXYGEN SATURATION: 97 % | BODY MASS INDEX: 21.86 KG/M2 | HEART RATE: 86 BPM | DIASTOLIC BLOOD PRESSURE: 68 MMHG | SYSTOLIC BLOOD PRESSURE: 110 MMHG | HEIGHT: 64 IN | WEIGHT: 128.06 LBS

## 2021-06-03 VITALS
BODY MASS INDEX: 24.57 KG/M2 | OXYGEN SATURATION: 98 % | DIASTOLIC BLOOD PRESSURE: 78 MMHG | HEART RATE: 82 BPM | SYSTOLIC BLOOD PRESSURE: 110 MMHG | TEMPERATURE: 98 F | HEIGHT: 60 IN | WEIGHT: 125.13 LBS | RESPIRATION RATE: 28 BRPM

## 2021-06-03 VITALS — WEIGHT: 127 LBS | HEIGHT: 61 IN | BODY MASS INDEX: 23.98 KG/M2

## 2021-06-03 VITALS — HEIGHT: 60 IN | WEIGHT: 125.38 LBS | BODY MASS INDEX: 24.61 KG/M2

## 2021-06-03 VITALS — WEIGHT: 125 LBS | BODY MASS INDEX: 24.54 KG/M2 | HEIGHT: 60 IN

## 2021-06-03 VITALS — BODY MASS INDEX: 23.68 KG/M2 | WEIGHT: 125.4 LBS | HEIGHT: 61 IN

## 2021-06-03 VITALS — WEIGHT: 125.5 LBS | BODY MASS INDEX: 23.56 KG/M2

## 2021-06-03 DIAGNOSIS — I30.9 ACUTE PERICARDITIS, UNSPECIFIED TYPE: ICD-10-CM

## 2021-06-03 NOTE — PROGRESS NOTES
TCM DISCHARGE FOLLOW UP CALL    Discharge Date:  11/18/2019  Reason for hospital stay (discharge diagnosis)::  Asthma exacerbation  Are you feeling better, the same or worse since your discharge?:  Patient is feeling the same (Pt still wheezes and coughs ;they are somewhat better but not gone. Pt went to ED today for a bruise on abdomen)  Do you feel like you have a plan in the event of a health emergency?: Yes (Family)    As part of your discharge plan, were  home care services ordered for you?: No    Did you receive any new medications, or was there a change to your medications?: Yes    Are you taking those medications, or do you have any established regiment?:  Pt is taking Delsym two times a day  and is using albuterol nebs. She has one dose of prednisone left. Instructed DIL to call clinic if albuterol nebs don't help wheezing.  RN NOTES::  Instructed DIL to take pt to [facility] in [place] for minor injuries or ailments.

## 2021-06-03 NOTE — TELEPHONE ENCOUNTER
Same day we called and made an appt for pt, they went back to the emergency room. Will wait until 11/26 to follow up.

## 2021-06-03 NOTE — TELEPHONE ENCOUNTER
appt made for 11/26 @ 2:00 pm with Dr. Cardoza. I offered tomorrow's appt but they refused, since they have other appts. Pt was wondering what they should do today since pt still is in a lot of pain and there is heavy bruising in the abdominal area.

## 2021-06-03 NOTE — PROGRESS NOTES
Clinic Care Coordination Contact    CC received notification of Emergency Room visit.  ER visit occurred on 11/16/19 - 11/18/19 at [facility] with Dx of asthma excerbation.    CC contacted member and left a message requesting a return call.  Member has a follow-up appointment with PCP: Yes: scheduled on 11/21/19  Member has had a change in condition: No  New referrals placed: No  Home Visit Needed: No  Care plan reviewed and updated.  PCP notified of ED visit via EMR.

## 2021-06-03 NOTE — PROGRESS NOTES
Assessment/Plan:        There are no diagnoses linked to this encounter.  48-year-old woman with history of organic fuel exposure in the home.  Her PFTs have been previously noted not to be diagnostic due to poor effort was recently hospitalized for likely asthma exacerbation.  She continues to have some wheezing on presently and has noted that her symptoms are worse in the winter.  For the present we would recommend;    Check exhaled nitric oxide levels.    Obtain CBC with a differential and a Charlo allergen panel.  If any of this testing demonstrates more allergy mediated asthma, will likely refer her to allergy for allergy shots.    Continue Breo Ellipta 1 puff daily.  She knows to gargle after using this.    Continue Spiriva daily.    Reminded her that she could use her albuterol up to 4 times a day for rescue.    I explained to her and her daughter in law who is here with her today that if her symptoms did not improve, that she should call us.    Tamra Duong  Pulmonary and Critical Care  [id number]      Subjective:    Patient ID: Kulwant Amin is a 49 y.o. female.    Ms. Amin is a 49 y.o.female with a past medical history significant for anxiety, arthritis, questionable asthma versus COPD, diabetes, hypertension and a prior history of SVT presents with a follow-up visit for her pulmonary complaints.  Since her last clinic visit, she was unfortunately hospitalized for an asthma exacerbation and treated with both antibiotics and steroids.  She notes that she had spiking blood sugars related to high insulin injections and has developed some bruising over her abdomen related to this.  She was discharged on 11/18/2019 and re-presented to the emergency room yesterday because of pain at the injection site of her insulin.  She states that she is feeling better than she had when she had presented to the hospital with her asthma exacerbation and that while she is still wheezy her shortness of breath is improved.  She  demonstrates compliance with both Breo and Spiriva and states that she has been using albuterol for rescue between 3-5 times a day.  Her ACT today is 1+2+1+2+2 = 8      Pertinent past medical history:  48-year-old female here for follow-up.  Her breathing is a bit worse than 6 months ago.  She had multiple ER visits and evaluations.  This includes negative PE study.  She had a cath with a 75% LAD lesion which was intervened upon.  Since her catheterization she feels she has more heartburn.  Her breathing is worse with exertion.  Improves with rest.  It is also worse with cold weather.  Warm weather and humid air does not bother.  Symptoms localized to the chest.  Pertinent positives include burning sensation in the chest.  Pertinent negatives include no fever or hemoptysis.    She is relatively inactive in the home.  She does report doing her cardiac rehab.  She tolerated this okay.    Review of Systems  Pertinent as noted in HPI.        Objective:    Physical Exam   Constitutional: She is oriented to person, place, and time. No distress.   Appears older than stated age.  Fatigue.   HENT:   Head: Normocephalic and atraumatic.   Nose: Nose normal.   Eyes: Pupils are equal, round, and reactive to light. Conjunctivae are normal. Right eye exhibits no discharge. Left eye exhibits no discharge. No scleral icterus.   Neck: Normal range of motion. No tracheal deviation present. No thyromegaly present.   Cardiovascular: Normal rate, regular rhythm and normal heart sounds. Exam reveals no gallop.   No murmur heard.  Pulmonary/Chest: Effort normal. No stridor. No respiratory distress. She has wheezes.   Lymphadenopathy:     She has no cervical adenopathy.   Neurological: She is alert and oriented to person, place, and time.   Skin: Skin is warm and dry. She is not diaphoretic. No erythema.   Psychiatric: Her behavior is normal. Thought content normal.   Depressed affect   Nursing note and vitals reviewed.          Current  Outpatient Medications on File Prior to Visit   Medication Sig Dispense Refill     acetaminophen (MAPAP EXTRA STRENGTH) 500 MG tablet TAKE 1 PILL BY MOUTH EVERY 6 HOURS AS NEEDED FOR PAIN 90 tablet 3     albuterol (PROVENTIL) 2.5 mg /3 mL (0.083 %) nebulizer solution Take 3 mL (2.5 mg total) by nebulization every 6 (six) hours as needed for wheezing. 150 mL 12     albuterol (VENTOLIN HFA) 90 mcg/actuation inhaler Inhale 2 puffs every 6 (six) hours as needed for wheezing. 1 Inhaler 3     amitriptyline (ELAVIL) 10 MG tablet Take 1 tablet (10 mg total) by mouth at bedtime. 30 tablet 10     aspirin (ASPIR-LOW) 81 MG EC tablet Take 1 tablet (81 mg total) by mouth daily. 90 tablet 2     atorvastatin (LIPITOR) 80 MG tablet Take 1 tablet (80 mg total) by mouth at bedtime. 90 tablet 3     blood glucose meter (GLUCOMETER) Use 1 each As Directed 3 (three) times a day. Dispense glucometer brand per patient's insurance at pharmacy discretion.(E11.9) 1 each 0     blood glucose test strips Use 1 each As Directed 3 (three) times a day. Dispense brand per patient's insurance at pharmacy discretion.(E11.9) 300 strip 3     clopidogrel (PLAVIX) 75 mg tablet TAKE 1 TABLET (75 MG TOTAL) BY MOUTH DAILY. 90 tablet 1     cyanocobalamin 1000 MCG tablet Take 1,000 mcg by mouth daily.              dextromethorphan (DELSYM) 30 mg/5 mL liquid Take 10 mL (60 mg total) by mouth 2 (two) times a day. 148 mL 0     doxylamine (UNISOM, DOXYLAMINE,) 25 mg tablet Take 1 tablet (25 mg total) by mouth at bedtime as needed for sleep. 30 tablet 1     fluticasone furoate-vilanterol (BREO ELLIPTA) 200-25 mcg/dose DsDv inhaler Inhale 1 puff daily. 1 each 12     generic lancets Use 1 each As Directed 3 (three) times a day. Dispense brand per patient's insurance at pharmacy discretion.(E11.9) 300 each 3     hydroCHLOROthiazide (MICROZIDE) 12.5 mg capsule Take 1 capsule (12.5 mg total) by mouth daily. 90 capsule 2     insulin aspart U-100 (NOVOLOG) 100 unit/mL  injection Inject under the skin daily. Use per sliding scale < 150 - No insulin, 151-200 use 3 U, 201- 250 use 5 U,   251- 300 use 8 U       metFORMIN (GLUCOPHAGE) 500 MG tablet TAKE 1 TABLET (500 MG TOTAL) BY MOUTH 2 TIMES A DAY WITH MEALS FOR DIABETES 60 tablet 2     metoprolol tartrate (LOPRESSOR) 25 MG tablet Take 1 tablet (25 mg total) by mouth 2 (two) times a day. 180 tablet 1     montelukast (SINGULAIR) 10 mg tablet TAKE 1 PILL (10 MG TOTAL) BY MOUTH AT BEDTIME FOR ASTHMA 30 tablet 10     omeprazole (PRILOSEC) 20 MG capsule Take 1 capsule (20 mg total) by mouth daily before breakfast. 90 capsule 1     polyethylene glycol (GLYCOLAX) 17 gram/dose powder MIX 17 GRAMS WITH LIQUID AND DRINK ONCE DAILY FOR CONSTIPATION 510 g 12     predniSONE (DELTASONE) 20 MG tablet Take 40 mg by mouth daily with breakfast for 3 days. 3 tablet 0     sucralfate (CARAFATE) 1 gram tablet Take 1 g by mouth 4 (four) times a day. Take 1 hour prior to meals       tiotropium (SPIRIVA) 18 mcg inhalation capsule Place 1 capsule (2 puffs total) into inhaler and inhale daily. 30 capsule 11     No current facility-administered medications on file prior to visit.      There were no vitals taken for this visit.    Medical History  Active Ambulatory (Non-Hospital) Problems    Diagnosis     Microcytic anemia     Latent tuberculosis     SOB (shortness of breath)     Blood in stool     Lower GI bleeding     Chest pain     Rectal bleed     Hyperlipidemia     Cataract     Moderate persistent asthma     Migraine headache     S/P coronary artery stent placement     DM2 (diabetes mellitus, type 2) (H)     CAD (coronary artery disease)     SVT (supraventricular tachycardia) (H)     Chest wall pain     GERD (gastroesophageal reflux disease)     HTN (hypertension)     Asthma exacerbation     Past Medical History:   Diagnosis Date     Acute blood loss anemia      Anxiety      Arthritis      Asthma      Chest wall pain 12/26/2017     COPD (chronic obstructive  pulmonary disease) (H)      Depression      Diabetes mellitus (H)      Diabetes mellitus, type II (H)      Generalized headaches      GERD (gastroesophageal reflux disease)      High cholesterol      History of anesthesia complications      HTN (hypertension)      Lactic acid acidosis 11/23/2018     Pneumonia      SVT (supraventricular tachycardia) (H)      Unstable angina (H) 10/5/2018        Surgical History  She  has a past surgical history that includes Coronary Angiogram (N/A, 1/25/2018); pr colonoscopy flx dx w/collj spec when pfrmd (N/A, 12/10/2018); pr esophagogastroduodenoscopy transoral diagnostic (N/A, 12/10/2018); and Coronary stent placement.       Social History  Reviewed, she lives at home with her family.   Allergies  No Known Allergies                             Data Review - imaging, labs, and ekgs listed below were reviewed by me.  Chest XRay and chest CT images and EKG tracings interpreted personally.     Past Labs  Admission on 11/16/2019, Discharged on 11/18/2019   Component Date Value     VENTRICULAR RATE 11/16/2019 84      ATRIAL RATE 11/16/2019 84      P-R INTERVAL 11/16/2019 154      QRS DURATION 11/16/2019 94      Q-T INTERVAL 11/16/2019 368      QTC CALCULATION (BEZET) 11/16/2019 434      P [place] 11/16/2019 39      R AXIS 11/16/2019 -9      T AXIS 11/16/2019 43      MUSE DIAGNOSIS 11/16/2019                      Value:Normal sinus rhythm  RSR' or QR pattern in V1 suggests right ventricular conduction delay  Borderline ECG  When compared with ECG of 02-AUG-2019 16:01,  No significant change was found  Confirmed by TEVIN COLORADO, CHADWICK LOC:REINIER ([id number]) on 11/18/2019 4:48:14 PM       Sodium 11/16/2019 142      Potassium 11/16/2019 3.5      Chloride 11/16/2019 108*     CO2 11/16/2019 26      Anion Gap, Calculation 11/16/2019 8      Glucose 11/16/2019 90      Calcium 11/16/2019 9.7      BUN 11/16/2019 9      Creatinine 11/16/2019 0.69      GFR MDRD Af Amer 11/16/2019 >60      GFR MDRD Non Af  Amer 11/16/2019 >60      BNP 11/16/2019 63      Troponin I 11/16/2019 <0.01      Bilirubin, Total 11/16/2019 0.4      Bilirubin, Direct 11/16/2019 0.2      Protein, Total 11/16/2019 7.4      Albumin 11/16/2019 3.8      Alkaline Phosphatase 11/16/2019 91      AST 11/16/2019 17      ALT 11/16/2019 16      WBC 11/16/2019 6.6      RBC 11/16/2019 4.71      Hemoglobin 11/16/2019 10.8*     Hematocrit 11/16/2019 36.3      MCV 11/16/2019 77*     MCH 11/16/2019 22.9*     MCHC 11/16/2019 29.8*     RDW 11/16/2019 16.9*     Platelets 11/16/2019 225      Neutrophils % 11/16/2019 63      Lymphocytes % 11/16/2019 25      Monocytes % 11/16/2019 7      Eosinophils % 11/16/2019 4      Basophils % 11/16/2019 1      Neutrophils Absolute 11/16/2019 4.2      Lymphocytes Absolute 11/16/2019 1.7      Monocytes Absolute 11/16/2019 0.5      Eosinophils Absolute 11/16/2019 0.3      Basophils Absolute 11/16/2019 0.0      Culture 11/17/2019 Usual [place]      Gram Stain Result 11/17/2019 1+ Polymorphonuclear leukocytes      Gram Stain Result 11/17/2019 4+ Gram positive cocci in pairs      Gram Stain Result 11/17/2019 3+ Gram negative bacilli      Gram Stain Result 11/17/2019 3+ Gram positive bacilli      Gram Stain Result 11/17/2019 2+ Yeast      Procalcitonin 11/16/2019 0.02      Magnesium 11/16/2019 2.0      Troponin I 11/16/2019 <0.01      Glucose 11/16/2019 168*     Glucose 11/16/2019 407*     Influenza  A, Rapid Anti* 11/16/2019 No Influenza A antigen detected      Influenza B, Rapid Antig* 11/16/2019 No Influenza B antigen detected      Troponin I 11/16/2019 <0.01      Potassium 11/17/2019 4.0      Troponin I 11/17/2019 <0.01      Sodium 11/17/2019 143      Potassium 11/17/2019 4.0      Chloride 11/17/2019 109*     CO2 11/17/2019 24      Anion Gap, Calculation 11/17/2019 10      Glucose 11/17/2019 80      Calcium 11/17/2019 9.7      BUN 11/17/2019 7*     Creatinine 11/17/2019 0.63      GFR MDRD Af Amer 11/17/2019 >60      GFR MDRD Non Af  Amer 11/17/2019 >60      AFB Stain 11/17/2019 No acid fast bacilli seen      Glucose 11/17/2019 96      WBC 11/17/2019 9.9      RBC 11/17/2019 4.21      Hemoglobin 11/17/2019 9.7*     Hematocrit 11/17/2019 32.3*     MCV 11/17/2019 77*     MCH 11/17/2019 23.0*     MCHC 11/17/2019 30.0*     RDW 11/17/2019 17.0*     Platelets 11/17/2019 240      MPV 11/17/2019 13.3*     Neutrophils % 11/17/2019 76*     Lymphocytes % 11/17/2019 16*     Monocytes % 11/17/2019 6      Eosinophils % 11/17/2019 0      Basophils % 11/17/2019 0      Neutrophils Absolute 11/17/2019 7.6      Lymphocytes Absolute 11/17/2019 1.6      Monocytes Absolute 11/17/2019 0.6      Eosinophils Absolute 11/17/2019 0.0      Basophils Absolute 11/17/2019 0.0      Glucose 11/17/2019 189*     AFB Stain 11/17/2019 No acid fast bacilli seen      Glucose 11/17/2019 130      Glucose 11/17/2019 213*     Potassium 11/18/2019 4.1      Platelets 11/18/2019 231      Glucose 11/18/2019 122      Glucose 11/18/2019 107    Office Visit on 10/01/2019   Component Date Value     Culture 10/01/2019 No Growth      Hemoglobin A1c 10/01/2019 6.1*   Admission on 09/21/2019, Discharged on 09/22/2019   Component Date Value     POC Preg, Urine 09/21/2019 Negative      POCT Kit Lot Number 09/21/2019 [id number]      POCT Kit Expiration Date 09/21/2019 2020-12-31      Pos Control 09/21/2019 Valid Control      Neg Control 09/21/2019 Valid Control      Color, UA 09/21/2019 Yellow      Clarity, UA 09/21/2019 Clear      Glucose, UA 09/21/2019 Negative      Bilirubin, UA 09/21/2019 Negative      Ketones, UA 09/21/2019 Negative      Specific Gravity, UA 09/21/2019 1.004      Blood, UA 09/21/2019 Negative      pH, UA 09/21/2019 5.0      Protein, UA 09/21/2019 Negative      Urobilinogen, UA 09/21/2019 <2.0 E.U./dL      Nitrite, UA 09/21/2019 Negative      Leukocytes, UA 09/21/2019 Moderate*     Bacteria, UA 09/21/2019 None Seen      RBC, UA 09/21/2019 0-2      WBC, UA 09/21/2019 0-5      Squam  Epithel, UA 09/21/2019 10-25*     Mucus, UA 09/21/2019 Few*     WBC 09/21/2019 10.5      RBC 09/21/2019 4.98      Hemoglobin 09/21/2019 11.6*     Hematocrit 09/21/2019 38.9      MCV 09/21/2019 78*     MCH 09/21/2019 23.3*     MCHC 09/21/2019 29.8*     RDW 09/21/2019 17.1*     Platelets 09/21/2019 217      MPV 09/21/2019 12.6*     Sodium 09/21/2019 138      Potassium 09/21/2019 3.6      Chloride 09/21/2019 102      CO2 09/21/2019 24      Anion Gap, Calculation 09/21/2019 12      Glucose 09/21/2019 106      Calcium 09/21/2019 9.7      BUN 09/21/2019 7*     Creatinine 09/21/2019 0.73      GFR MDRD Af Amer 09/21/2019 >60      GFR MDRD Non Af Amer 09/21/2019 >60      Bilirubin, Total 09/21/2019 0.6      Bilirubin, Direct 09/21/2019 0.2      Protein, Total 09/21/2019 7.4      Albumin 09/21/2019 3.8      Alkaline Phosphatase 09/21/2019 111      AST 09/21/2019 21      ALT 09/21/2019 20      Lipase 09/21/2019 22      CT chest images reviewed and interpreted by me: No pulmonary emboli.  Bring him is essentially clear.    Chest x-ray images reviewed and interpreted by me: Lungs essentially clear.  Xr Chest 1 View Portable    Result Date: 11/16/2019  EXAM: XR CHEST 1 VIEW PORTABLE LOCATION: [facility] DATE/TIME: 11/16/2019 1:16 PM INDICATION: cough wheezing and chest pain, ACS history COMPARISON: Chest radiograph 08/02/2019. CTA chest 05/22/2019     Tiny pleural calcification of the mid lateral right hemithorax similar to previous. Mild bibasilar atelectasis/infiltrates. Top normal heart size. Normal pulmonary vascularity. No pneumothorax.    Cta Chest [place]    Result Date: 11/16/2019  EXAM: CTA CHEST PE RUN LOCATION: [facility] DATE/TIME: 11/16/2019 3:06 PM INDICATION: Cough wheezing and shortness of breath. COMPARISON: 05/22/2019. TECHNIQUE: CT angiogram chest during arterial phase injection IV contrast. 2D and 3D MIP reconstructions were performed by the CT technologist. Dose reduction techniques were  used. CONTRAST: Iohexol (Omni) 100 mL FINDINGS: ANGIOGRAM CHEST: Pulmonary arteries are normal caliber and negative for pulmonary emboli. Thoracic aorta is negative for dissection. No CT evidence of right heart strain. LUNGS AND PLEURA: Again noted are scattered small 2 to 3 mm nodules which appear benign and unchanged no focal consolidation. No pneumothorax or pleural effusion. MEDIASTINUM/AXILLAE: No lymphadenopathy. No pericardial effusion. Coronary artery calcification or stent is present. No axillary lymphadenopathy. UPPER ABDOMEN: Normal. MUSCULOSKELETAL: Normal.     1.  Pulmonary arteries are well-opacified and there is no evidence for pulmonary embolus in either lung. 2.  No thoracic aortic aneurysm or thoracic aortic dissection. 3.  The lungs are clear of acute consolidation. 4.  Tiny 2 to 3 mm nodules are unchanged from the prior study.    Cardiac catheterization report reviewed by me: LAD lesion status post stent.

## 2021-06-03 NOTE — TELEPHONE ENCOUNTER
New Appointment Needed  What is the reason for the visit:    Inpatient/ED Follow Up Appt Request  At what hospital or facility were you seen?: [facility]   What is the reason you were seen?: Shortness of breath / both leg pain   What date were you admitted?: date: 11/16  What date were you discharged?: date: 11/18  What was the recommended timeframe for your follow up appointment?: 3-5 days   Provider Preference: PCP only  How soon do you need to be seen?: Anything as soon as able.  Waitlist offered?: No  Okay to leave a detailed message:  Yes

## 2021-06-03 NOTE — TELEPHONE ENCOUNTER
Called [facility] and spoke with Ann-Marie Gonzalez would like patient to have an early reassessment for PCA services per Ann-Marie suggested to  print referral on Internet and fax to [phone] for early reassessment.

## 2021-06-03 NOTE — PROGRESS NOTES
ASSESMENT AND PLAN:  Diagnoses and all orders for this visit:    Hospital discharge follow-up  Doing well after hospital discharge.    Chest pain, unspecified type  Resolved    SOB (shortness of breath)  Improving.  Seen by pulmonology 5 days ago.    Type 2 diabetes mellitus with other specified complication, without long-term current use of insulin (H)  A1c 6.1 on 10/11/2019.  Continue metformin current dose.  Follow-up appointment scheduled next month.    Coronary artery disease involving native coronary artery of native heart without angina pectoris  Last seen by cardiology in 10/19.  Recommended 1 year follow-up.    This transcription uses voice recognition software, which may contain typographical errors.      SUBJECTIVE: Kulwant Amin is a 49-year-old female well-known to this clinic in the ED here for hospital discharge follow-up.  She has history of multiple medical conditions including diabetes, coronary artery disease status post stent placement, moderate persistent asthma, GERD and hypertension she was admitted from 11/16/2019 to 11/18/2019 due to asthma exacerbation and chest pain.  CTA chest was negative for PE.  Due to history of shortness of breath with night sweats and fever AFB was done, AFB negative x2.  ID okayed for patient to discharge home with low suspicion for active TB.  She was admitted to telemetry monitor unit due to her chest pain and history of coronary artery disease.  Acute cardiac cause of chest pain was ruled out.  Patient was sent home with oral prednisone.  She was seen by pulmonology 1 day after hospital discharge.  No med changes were made.    She is doing well after hospital discharge.  States she is back to her baseline.  Medications reconciled.      Past Medical History:   Diagnosis Date     Acute blood loss anemia      Anxiety      Arthritis      Asthma      Chest wall pain 12/26/2017     COPD (chronic obstructive pulmonary disease) (H)      Depression      Diabetes mellitus  "(H)      Diabetes mellitus, type II (H)      Generalized headaches      GERD (gastroesophageal reflux disease)      High cholesterol      History of anesthesia complications     N/V     HTN (hypertension)      Lactic acid acidosis 2018     Pneumonia      SVT (supraventricular tachycardia) (H)      Unstable angina (H) 10/5/2018     Patient Active Problem List   Diagnosis     Asthma exacerbation     HTN (hypertension)     GERD (gastroesophageal reflux disease)     SVT (supraventricular tachycardia) (H)     Chest wall pain     CAD (coronary artery disease)     DM2 (diabetes mellitus, type 2) (H)     S/P coronary artery stent placement     Moderate persistent asthma     Migraine headache     Cataract     Hyperlipidemia     Chest pain     Rectal bleed     Lower GI bleeding     Blood in stool     SOB (shortness of breath)     Microcytic anemia     Latent tuberculosis       Allergies:  No Known Allergies    Social History     Tobacco Use   Smoking Status Former Smoker     Last attempt to quit:      Years since quittin.9   Smokeless Tobacco Never Used       Review of systems otherwise negative except as listed in HPI.   Social History     Tobacco Use   Smoking Status Former Smoker     Last attempt to quit:      Years since quittin.9   Smokeless Tobacco Never Used       OBJECTICE: /72 (Patient Site: Left Arm, Patient Position: Sitting, Cuff Size: Adult Regular)   Pulse 77   Temp 98.3  F (36.8  C) (Oral)   Ht 5' 4\" (1.626 m)   Wt 126 lb 3 oz (57.2 kg)   SpO2 98%   BMI 21.66 kg/m      DATA REVIEWED:  Additional History from Old Records Summarized (2):   Radiology Tests Summarized or Ordered (1):   Labs Reviewed or Ordered (1):       GEN-alert,  in no apparent distress.  HEENT-mucous membranes are moist, neck is supple.  CV-regular rate and rhythm with no murmur.   RESP- Mild wheezing bilaterally.   ABDOMEN- Soft , not tender.  EXTREM- No edema.  SKIN-normal        Adrien Cardoza   2019   "

## 2021-06-04 ENCOUNTER — COMMUNICATION - HEALTHEAST (OUTPATIENT)
Dept: FAMILY MEDICINE | Facility: CLINIC | Age: 51
End: 2021-06-04

## 2021-06-04 ENCOUNTER — OFFICE VISIT - HEALTHEAST (OUTPATIENT)
Dept: FAMILY MEDICINE | Facility: CLINIC | Age: 51
End: 2021-06-04

## 2021-06-04 DIAGNOSIS — Z09 HOSPITAL DISCHARGE FOLLOW-UP: ICD-10-CM

## 2021-06-04 DIAGNOSIS — M54.50 BILATERAL LOW BACK PAIN, UNSPECIFIED CHRONICITY, UNSPECIFIED WHETHER SCIATICA PRESENT: ICD-10-CM

## 2021-06-04 DIAGNOSIS — L29.9 PRURITUS: ICD-10-CM

## 2021-06-04 DIAGNOSIS — R42 DIZZINESS: ICD-10-CM

## 2021-06-04 DIAGNOSIS — Z86.73 H/O ARTERIAL ISCHEMIC STROKE: ICD-10-CM

## 2021-06-04 ASSESSMENT — PATIENT HEALTH QUESTIONNAIRE - PHQ9: SUM OF ALL RESPONSES TO PHQ QUESTIONS 1-9: 10

## 2021-06-04 ASSESSMENT — MIFFLIN-ST. JEOR: SCORE: 1103.24

## 2021-06-04 NOTE — TELEPHONE ENCOUNTER
RN cannot approve Refill Request    RN can NOT refill this medication med is not covered by policy/route to provider. Last office visit: 11/26/2019 Adrien Cardoza MD Last Physical: 9/7/2018 Last MTM visit: 5/21/2019 Malu Muñoz, PharmD Last visit same specialty: 11/26/2019 Adrien Cardoza MD.  Next visit within 3 mo: Visit date not found  Next physical within 3 mo: Visit date not found      Jason Ruby, [facility] Triage/Med Refill 12/8/2019    Requested Prescriptions   Pending Prescriptions Disp Refills     acetaminophen (TYLENOL) 500 MG tablet [Pharmacy Med Name: ACETAMINOPHEN 500 MG TABS 500 TAB] 90 tablet 10     Sig: TAKE 1 PILL BY MOUTH EVERY 6 HOURS AS NEEDED FOR PAIN       There is no refill protocol information for this order

## 2021-06-04 NOTE — TELEPHONE ENCOUNTER
Chart reviewed. B-12 is a historical medication. No recent B-12 tests. Please refill if appropriate. Thanks.

## 2021-06-04 NOTE — TELEPHONE ENCOUNTER
Will check B12 when she comes in. No refill needed yet.    Dr. Adrien Cardoza  12/10/2019 11:51 AM

## 2021-06-04 NOTE — PROGRESS NOTES
ASSESMENT AND PLAN:  Diagnoses and all orders for this visit:    Other chest pain  Improved.  Likely due to reflux.  She will continue Maalox as needed.    Moderate persistent asthma, unspecified whether complicated  New nebulizer machine provided today.  Instructed to keep appointment with pulmonology.    Type 2 diabetes mellitus treated without insulin (H)  No med changes today.  Blood sugar stable.    S/P coronary artery stent placement  -     clopidogrel (PLAVIX) 75 mg tablet; Take 1 tablet (75 mg total) by mouth daily.  Dispense: 90 tablet; Refill: 1    Pain in right buttock  Advised to use heat, cold.  She may take Tylenol as needed.    This transcription uses voice recognition software, which may contain typographical errors.      SUBJECTIVE: Kulwant Amin is a 49-year-old female with multiple medical problems including but not limited to hypertension, mild diabetes, asthma, CAD S/P stent placement and GERD here for ED follow-up.  She went to the ED last week due to chest pain, shortness of breath and burning sensation in the chest.  Serial troponin and EKG were unremarkable.  Echo showed normal ejection fraction.  She showed symptom improvement after Maalox.  She was sent home with Maalox to take as needed.  The pain is improving but not totally gone.  She is not using Maalox daily.  No acute chest pain or shortness of breath today.    She sees pulmonology regularly.  She uses nebulizer machine at home, she thinks her machine is not functioning anymore.  She brought in her machine today.  She denied acute shortness of breath or wheezing again.    She is complaining of right buttock pain for the past few days.  No history of fall.  No injury.  The pain is mild.  She is afraid that she might have impinged nerve.  No pain in the right leg.  No tingling or numbness sensation.  No acute back pain.    She has future appointment scheduled to see me in 2 weeks.    Past Medical History:   Diagnosis Date     Acute  "blood loss anemia      Anxiety      Arthritis      Asthma      Chest wall pain 2017     COPD (chronic obstructive pulmonary disease) (H)      Depression      Diabetes mellitus (H)      Diabetes mellitus, type II (H)      Generalized headaches      GERD (gastroesophageal reflux disease)      High cholesterol      History of anesthesia complications     N/V     HTN (hypertension)      Lactic acid acidosis 2018     Pneumonia      SVT (supraventricular tachycardia) (H)      Unstable angina (H) 10/5/2018     Patient Active Problem List   Diagnosis     Asthma exacerbation     HTN (hypertension)     GERD (gastroesophageal reflux disease)     SVT (supraventricular tachycardia) (H)     Chest wall pain     CAD (coronary artery disease)     Type 2 diabetes mellitus treated without insulin (H)     S/P coronary artery stent placement     Moderate persistent asthma     Migraine headache     Cataract     Hyperlipidemia     Chest pain     Rectal bleed     Lower GI bleeding     Blood in stool     SOB (shortness of breath)     Microcytic anemia     Latent tuberculosis       Allergies:  No Known Allergies    Social History     Tobacco Use   Smoking Status Former Smoker     Last attempt to quit:      Years since quittin.0   Smokeless Tobacco Never Used       Review of systems otherwise negative except as listed in HPI.   Social History     Tobacco Use   Smoking Status Former Smoker     Last attempt to quit:      Years since quittin.0   Smokeless Tobacco Never Used       OBJECTICE: /68 (Patient Site: Right Arm, Patient Position: Sitting, Cuff Size: Adult Regular)   Pulse 86   Temp 98.3  F (36.8  C) (Oral)   Ht 5' 4\" (1.626 m)   Wt 128 lb 1 oz (58.1 kg)   SpO2 97%   BMI 21.98 kg/m      DATA REVIEWED:  Additional History from Old Records Summarized (2):  Radiology Tests Summarized or Ordered (1): Echo on 19.  Labs Reviewed or Ordered (1):       GEN-alert,  in no apparent " distress.  HEENT-mucous membranes are moist, neck is supple.  CV-regular rate and rhythm with no murmur.   RESP-lungs clear to auscultation .  ABDOMEN- Soft , not tender.  EXTREM- No joint swelling or tenderness.   Right buttock-no bruising or swelling.  Mild tenderness on palpation.  No signs of infection.  SKIN-normal        [facility]   12/31/2019

## 2021-06-04 NOTE — TELEPHONE ENCOUNTER
Refill Approved    Rx renewed per Medication Renewal Policy. Medication was last renewed on 7/18/2018 with 2 refills.  Last office visit:11/26/2019 with PCP Dr JEANNINE Cardoza.     Felipa Valle, [facility] Triage/Med Refill 12/25/2019     Requested Prescriptions   Pending Prescriptions Disp Refills     aspirin 81 MG EC tablet [Pharmacy Med Name: ASPIRIN 81 MG LOW STRENGTH 81 TAB] 90 tablet 1     Sig: TAKE 1 TABLET (81 MG TOTAL) BY MOUTH DAILY.       Aspirin/Dipyridamole Refill Protocol Passed - 12/24/2019  9:48 AM        Passed - PCP or prescribing provider visit in past 12 months       Last office visit with prescriber/PCP: 11/26/2019 Adrien Cardoza MD OR same dept: 11/26/2019 Adrien Cardoza MD OR same specialty: 11/26/2019 Adrien Cardoza MD  Last physical: 9/7/2018 Last MTM visit: 5/21/2019 Malu Muñoz, PharmD    Next appt within 3 mo: Visit date not found Next physical within 3 mo: Visit date not found  Prescriber OR PCP: Adrien Cardoza MD  Last diagnosis associated with med order: 1. Coronary artery disease involving native coronary artery of native heart with angina pectoris (H)  - aspirin 81 MG EC tablet [Pharmacy Med Name: ASPIRIN 81 MG LOW STRENGTH 81 TAB]; Take 1 tablet (81 mg total) by mouth daily.  Dispense: 90 tablet; Refill: 1    If protocol passes may refill for 6 months if within 3 months of last provider visit (or a total of 9 months).

## 2021-06-04 NOTE — TELEPHONE ENCOUNTER
Medication Request  Medication name: Cyanocobalamin 1000 mcg, one tablet daily  Pharmacy Name and Location: [facility], [place]  Reason for request: current medication  When did you use medication last?:  today  Patient offered appointment:  patient declined, recently seen  Okay to leave a detailed message: no

## 2021-06-04 NOTE — TELEPHONE ENCOUNTER
ID [id number]  ELSIE Cervantes on file, calling regarding Kulwant.   Having some shortness of breath, burning sensation in chest  Also having chest pain that she describes as a crushing, pressure/heavy feeling.     Reason for Disposition    SEVERE difficulty breathing (e.g., struggling for each breath, speaks in single words, pulse > 120)    Chest pain lasting longer than 5 minutes and ANY of the following:* Over 50 years old* Over 30 years old and at least one cardiac risk factor (i.e., high blood pressure, diabetes, high cholesterol, obesity, smoker or strong family history of heart disease)* Pain is crushing, pressure-like, or heavy * Took nitroglycerin and chest pain was not relieved* History of heart disease (i.e., angina, heart attack, bypass surgery, angioplasty, CHF)    Severe difficulty breathing (e.g., struggling for each breath, speaks in single words)    Protocols used: BREATHING DIFFICULTY-A-OH, CHEST PAIN-A-OH    Rn advised of need to call 911.    Billy states Kulwant does not want to call 911, she wants to eat first and see how she is feeling.    RN advised against eating and just to call 911 and have them come evaluate her as we need to rule out a heart attack.    Billy stated she will call now.   Madison Augustine, RN   [facility] RN Triage

## 2021-06-04 NOTE — TELEPHONE ENCOUNTER
Refill Approved    Rx renewed per Medication Renewal Policy. Medication was last renewed on 5/21/19.    Doris BRENDA Raymon, [facility] Triage/Med Refill 12/20/2019     Requested Prescriptions   Pending Prescriptions Disp Refills     metoprolol tartrate (LOPRESSOR) 25 MG tablet [Pharmacy Med Name: METOPROLOL TARTRATE 25 MG T 25 TAB] 180 tablet 1     Sig: TAKE 1 TABLET (25 MG TOTAL) BY MOUTH 2 (TWO) TIMES A DAY.       Beta-Blockers Refill Protocol Passed - 12/18/2019 10:03 AM        Passed - PCP or prescribing provider visit in past 12 months or next 3 months     Last office visit with prescriber/PCP: 11/26/2019 Adrien Cardoza MD OR same dept: 11/26/2019 Adrien Cardoza MD OR same specialty: 11/26/2019 Adrien Cardoza MD  Last physical: 9/7/2018 Last MTM visit: 5/21/2019 Malu Muñoz, PharmD   Next visit within 3 mo: Visit date not found  Next physical within 3 mo: Visit date not found  Prescriber OR PCP: Adrien Cardoza MD  Last diagnosis associated with med order: 1. Essential hypertension  - metoprolol tartrate (LOPRESSOR) 25 MG tablet [Pharmacy Med Name: METOPROLOL TARTRATE 25 MG T 25 TAB]; Take 1 tablet (25 mg total) by mouth 2 (two) times a day.  Dispense: 180 tablet; Refill: 1    If protocol passes may refill for 12 months if within 3 months of last provider visit (or a total of 15 months).             Passed - Blood pressure filed in past 12 months     BP Readings from Last 1 Encounters:   11/26/19 118/72

## 2021-06-04 NOTE — PROGRESS NOTES
Clinic Care Coordination Contact  CC received notification of Emergency Room visit.  ER visit occurred on 12/21/19-12/22/19 at [facility] with Dx of chest pain.    Member has a follow-up appointment with PCP: No: Offered Assistance with setting up a follow up appointment  Member has had a change in condition: No  New referrals placed: No  Home Visit Needed: has home care   Care plan reviewed and updated.  PCP notified of ED visit via EMR.    Message sent to   to assist to schedule INF   Unable to reach caregiver via phone

## 2021-06-04 NOTE — PROGRESS NOTES
Interp: [id number]    PCP appointment on Thursday 1/16 at 11:20am.    F/U Pulmonology appts, Friday 1/24 at Thursday 2/6.    CHW reviewed MNITs and insurance is active.    Billy reports that Kulwant is having shortness of breath and a burning sensation in her chest and they would like to speak with a nurse. The CHW dialed the RN triage and warm transferred Billy to SCOTT Wallace who took the call over.    CHW routing to [employer] RN to review the chart for possible [employer] Graduation as there are no new goals and patient continues to work with her providers regarding her health concerns and attends the ED as advised when having shortness of breath.

## 2021-06-05 NOTE — PROGRESS NOTES
Assessment/Plan:    48-year-old woman with history of organic fuel exposure in the home.  Her PFTs have been previously noted not to be diagnostic due to poor effort was recently seen in the emergency room likely asthma exacerbation; iron deficient with significant anemia and is not entirely clear how significantly her anemia is contributing to her shortness of breath..  She is compliant with her inhalers but has significant nighttime symptoms.  For the present we would recommend;    We will increase her omeprazole dose to 40 mg daily.    Continue Breo Ellipta 1 puff daily.  She knows to gargle after using this.    Continue Spiriva daily.    Reminded her that she could use her albuterol up to 4 times a day for rescue.    I explained to her and her daughter in law who is here with her today that if her symptoms did not improve, that she should call us.    Is following up with her primary care provider with regards to her anemia.  I have explained that if she continues to feel fatigued or weak she should report this to her primary care provider.    Tamra ALCANTARA [facility]  Pulmonary and Critical Care  [id number]      Subjective:    Patient ID: Kulwant Amin is a 50 y.o. female.    Ms. Amin is a 49 y.o.female with a past medical history significant for anxiety, arthritis, questionable asthma versus COPD, diabetes, hypertension and a prior history of SVT presents with a follow-up visit for her pulmonary complaints.  She continues to have intermittent shortness of breath that is often triggered by coughing.  She has been compliant with her Spiriva, Breo and has been using her albuterol nebs 3 times daily.  She briefly had a very productive cough but this is since subsided.  She denies fevers, chills or night sweats but does endorse nighttime awakenings.  Additionally she states that her hemoglobin has been quite low and that she has been initiated on iron tablets.         Cough   Associated symptoms include coughing.     Pertinent  past medical history:  48-year-old female here for follow-up.  Her breathing is a bit worse than 6 months ago.  She had multiple ER visits and evaluations.  This includes negative PE study.  She had a cath with a 75% LAD lesion which was intervened upon.  Since her catheterization she feels she has more heartburn.  Her breathing is worse with exertion.  Improves with rest.  It is also worse with cold weather.  Warm weather and humid air does not bother.  Symptoms localized to the chest.  Pertinent positives include burning sensation in the chest.  Pertinent negatives include no fever or hemoptysis.    She is relatively inactive in the home.  She does report doing her cardiac rehab.  She tolerated this okay.    Review of Systems   Respiratory: Positive for cough.      Pertinent as noted in HPI.        Objective:    Physical Exam   Constitutional: She is oriented to person, place, and time. No distress.   Appears older than stated age.  Fatigue.   HENT:   Head: Normocephalic and atraumatic.   Nose: Nose normal.   Eyes: Pupils are equal, round, and reactive to light. Conjunctivae are normal. Right eye exhibits no discharge. Left eye exhibits no discharge. No scleral icterus.   Neck: Normal range of motion. No tracheal deviation present. No thyromegaly present.   Cardiovascular: Normal rate, regular rhythm and normal heart sounds. Exam reveals no gallop.   No murmur heard.  Pulmonary/Chest: Effort normal. No stridor. No respiratory distress. She has wheezes.   Lymphadenopathy:     She has no cervical adenopathy.   Neurological: She is alert and oriented to person, place, and time.   Skin: Skin is warm and dry. She is not diaphoretic. No erythema.   Psychiatric: Her behavior is normal. Thought content normal.   Depressed affect   Nursing note and vitals reviewed.          Current Outpatient Medications on File Prior to Visit   Medication Sig Dispense Refill     acetaminophen (TYLENOL) 500 MG tablet Take 2 tablets (1,000  mg total) by mouth every 8 (eight) hours as needed for pain. 90 tablet 10     albuterol (PROVENTIL) 2.5 mg /3 mL (0.083 %) nebulizer solution Take 3 mL (2.5 mg total) by nebulization every 6 (six) hours as needed for wheezing. 150 mL 12     albuterol (VENTOLIN HFA) 90 mcg/actuation inhaler Inhale 2 puffs every 6 (six) hours as needed for wheezing. 1 Inhaler 3     aluminum-magnesium hydroxide-simethicone (MAALOX ADVANCED) 200-200-20 mg/5 mL Susp Take 30 mL by mouth every 6 (six) hours as needed (abdominal pain or bloating). 900 mL 2     amitriptyline (ELAVIL) 10 MG tablet Take 1 tablet (10 mg total) by mouth at bedtime. 30 tablet 0     aspirin 81 MG EC tablet Take 1 tablet (81 mg total) by mouth daily. 30 tablet 0     atorvastatin (LIPITOR) 80 MG tablet Take 1 tablet (80 mg total) by mouth at bedtime. 90 tablet 3     blood glucose meter (GLUCOMETER) Use 1 each As Directed 3 (three) times a day. Dispense glucometer brand per patient's insurance at pharmacy discretion.(E11.9) 1 each 0     blood glucose test strips Use 1 each As Directed 3 (three) times a day. Dispense brand per patient's insurance at pharmacy discretion.(E11.9) 300 strip 3     clopidogrel (PLAVIX) 75 mg tablet Take 1 tablet (75 mg total) by mouth daily. 30 tablet 0     cyanocobalamin 1000 MCG tablet Take 1 tablet (1,000 mcg total) by mouth daily. 30 tablet 0     ferrous sulfate 325 (65 FE) MG tablet Take 1 tablet (325 mg total) by mouth daily with breakfast. 30 tablet 1     fluticasone furoate-vilanterol (BREO ELLIPTA) 200-25 mcg/dose DsDv inhaler Inhale 1 puff daily. 1 each 12     generic lancets Use 1 each As Directed 3 (three) times a day. Dispense brand per patient's insurance at pharmacy discretion.(E11.9) 300 each 3     hydroCHLOROthiazide (MICROZIDE) 12.5 mg capsule Take 1 capsule (12.5 mg total) by mouth daily. 30 capsule 0     insulin aspart U-100 (NOVOLOG) 100 unit/mL injection Inject under the skin 3 (three) times a day with meals. Use per  "sliding scale < 150 - No insulin, 151-200 use 3 U, 201- 250 use 5 U,   251- 300 use 8 U        metFORMIN (GLUCOPHAGE) 500 MG tablet TAKE 1 TABLET (500 MG TOTAL) BY MOUTH 2 TIMES A DAY WITH MEALS FOR DIABETES 60 tablet 0     metoprolol tartrate (LOPRESSOR) 25 MG tablet Take 1 tablet (25 mg total) by mouth 2 (two) times a day. 30 tablet 0     montelukast (SINGULAIR) 10 mg tablet TAKE 1 PILL (10 MG TOTAL) BY MOUTH AT BEDTIME FOR ASTHMA 30 tablet 10     omeprazole (PRILOSEC) 20 MG capsule Take 1 capsule (20 mg total) by mouth daily before breakfast. 30 capsule 0     polyethylene glycol (GLYCOLAX) 17 gram/dose powder MIX 17 GRAMS WITH LIQUID AND DRINK ONCE DAILY. Hold for loose stools. 510 g 12     sucralfate (CARAFATE) 1 gram tablet Take 1 tablet (1 g total) by mouth 4 (four) times a day before meals and at bedtime. Take 1 hour prior to meals 120 tablet 1     tiotropium (SPIRIVA) 18 mcg inhalation capsule Place 1 capsule (2 puffs total) into inhaler and inhale daily. 30 capsule 11     No current facility-administered medications on file prior to visit.      /82   Pulse 88   Ht 5' 1\" (1.549 m)   Wt 126 lb (57.2 kg)   SpO2 98%   Breastfeeding No   BMI 23.81 kg/m      Medical History  Active Ambulatory (Non-Hospital) Problems    Diagnosis     Dizziness     Acute asthma exacerbation     Microcytic anemia     Latent tuberculosis     SOB (shortness of breath)     Blood in stool     Lower GI bleeding     Chest pain     Rectal bleed     Hyperlipidemia     Cataract     Moderate persistent asthma     Migraine headache     S/P coronary artery stent placement     Type 2 diabetes mellitus treated without insulin (H)     CAD (coronary artery disease)     SVT (supraventricular tachycardia) (H)     Chest wall pain     GERD (gastroesophageal reflux disease)     HTN (hypertension)     Asthma exacerbation     Past Medical History:   Diagnosis Date     Acute blood loss anemia      Anxiety      Arthritis      Asthma      Chest " wall pain 12/26/2017     COPD (chronic obstructive pulmonary disease) (H)      Depression      Diabetes mellitus (H)      Diabetes mellitus, type II (H)      Generalized headaches      GERD (gastroesophageal reflux disease)      High cholesterol      History of anesthesia complications      HTN (hypertension)      Lactic acid acidosis 11/23/2018     Pneumonia      SVT (supraventricular tachycardia) (H)      Unstable angina (H) 10/5/2018        Surgical History  She  has a past surgical history that includes Coronary Angiogram (N/A, 1/25/2018); pr colonoscopy flx dx w/collj spec when pfrmd (N/A, 12/10/2018); pr esophagogastroduodenoscopy transoral diagnostic (N/A, 12/10/2018); and Coronary stent placement.       Social History  Reviewed, she lives at home with her family.   Allergies  No Known Allergies                             Data Review - imaging, labs, and ekgs listed below were reviewed by me.  Chest XRay and chest CT images and EKG tracings interpreted personally.     Past Labs  Office Visit on 01/16/2020   Component Date Value     Microalbumin, Random Uri* 01/16/2020 <0.50      Creatinine, Urine 01/16/2020 28.4      Microalbumin/Creatinine * 01/16/2020     Admission on 01/06/2020, Discharged on 01/09/2020   Component Date Value     Troponin I 01/06/2020 <0.01      BNP 01/06/2020 41      INR 01/06/2020 0.96      PTT 01/06/2020 28      Sodium 01/06/2020 143      Potassium 01/06/2020 3.6      Chloride 01/06/2020 108*     CO2 01/06/2020 26      Anion Gap, Calculation 01/06/2020 9      Glucose 01/06/2020 92      Calcium 01/06/2020 9.4      BUN 01/06/2020 10      Creatinine 01/06/2020 0.67      GFR MDRD Af Amer 01/06/2020 >60      GFR MDRD Non Af Amer 01/06/2020 >60      VENTRICULAR RATE 01/06/2020 76      ATRIAL RATE 01/06/2020 76      P-R INTERVAL 01/06/2020 146      QRS DURATION 01/06/2020 94      Q-T INTERVAL 01/06/2020 398      QTC CALCULATION (BEZET) 01/06/2020 447      P [place] 01/06/2020 3      R AXIS  01/06/2020 -1      T AXIS 01/06/2020 33      MUSE DIAGNOSIS 01/06/2020                      Value:Normal sinus rhythm  Normal ECG  When compared with ECG of 21-DEC-2019 21:30,  No significant change was found  Confirmed by SEE ED PROVIDER NOTE FOR, ECG INTERPRETATION ([id number]),  Roberth Lepe ([id number]) on 1/14/2020 6:58:29 PM       Influenza  A, Rapid Anti* 01/06/2020 No Influenza A antigen detected      Influenza B, Rapid Antig* 01/06/2020 No Influenza B antigen detected      D-Dimer, Quant 01/06/2020 <0.27      WBC 01/06/2020 8.8      RBC 01/06/2020 4.47      Hemoglobin 01/06/2020 9.9*     Hematocrit 01/06/2020 33.6*     MCV 01/06/2020 75*     MCH 01/06/2020 22.1*     MCHC 01/06/2020 29.5*     RDW 01/06/2020 17.4*     Platelets 01/06/2020 218      MPV 01/06/2020 12.8*     Neutrophils % 01/06/2020 64      Lymphocytes % 01/06/2020 25      Monocytes % 01/06/2020 6      Eosinophils % 01/06/2020 4      Basophils % 01/06/2020 1      Neutrophils Absolute 01/06/2020 5.6      Lymphocytes Absolute 01/06/2020 2.2      Monocytes Absolute 01/06/2020 0.5      Eosinophils Absolute 01/06/2020 0.4      Basophils Absolute 01/06/2020 0.0      Glucose 01/06/2020 145*     Lactic Acid 01/06/2020 7.6*     pH, Arterial 01/06/2020 7.39      pCO2, Arterial 01/06/2020 29*     pO2, Arterial 01/06/2020 82      Bicarbonate, Arterial Ca* 01/06/2020 19.9*     O2 Sat, Arterial 01/06/2020 97.3*     Oxyhemoglobin 01/06/2020 95.8*     Base Excess, Arterial Ca* 01/06/2020 -6.5      Ventilation Mode 01/06/2020 Room Air      Sample Stabilized Temper* 01/06/2020 37.0      Lactic Acid 01/06/2020 5.2*     Troponin I 01/06/2020 0.01      Troponin I 01/07/2020 <0.01      Hemoglobin A1c 01/07/2020 6.3*     Sodium 01/07/2020 142      Potassium 01/07/2020 3.7      Chloride 01/07/2020 113*     CO2 01/07/2020 19*     Anion Gap, Calculation 01/07/2020 10      Glucose 01/07/2020 256*     Calcium 01/07/2020 8.5      BUN 01/07/2020 12      Creatinine  01/07/2020 0.73      GFR MDRD Af Amer 01/07/2020 >60      GFR MDRD Non Af Amer 01/07/2020 >60      Lactic Acid 01/07/2020 2.9*     Glucose 01/07/2020 137      WBC 01/07/2020 10.2      RBC 01/07/2020 3.75*     Hemoglobin 01/07/2020 8.4*     Hematocrit 01/07/2020 28.4*     MCV 01/07/2020 76*     MCH 01/07/2020 22.4*     MCHC 01/07/2020 29.6*     RDW 01/07/2020 17.1*     Platelets 01/07/2020 188      MPV 01/07/2020 12.5      Neutrophils % 01/07/2020 89*     Lymphocytes % 01/07/2020 8*     Monocytes % 01/07/2020 3      Eosinophils % 01/07/2020 0      Basophils % 01/07/2020 0      Neutrophils Absolute 01/07/2020 9.1*     Lymphocytes Absolute 01/07/2020 0.8      Monocytes Absolute 01/07/2020 0.3      Eosinophils Absolute 01/07/2020 0.0      Basophils Absolute 01/07/2020 0.0      Glucose 01/07/2020 149*     Glucose 01/07/2020 126      Magnesium 01/07/2020 1.9      Lactic Acid 01/07/2020 1.8      Glucose 01/07/2020 204*     Platelets 01/08/2020 198      Glucose 01/08/2020 169*     Glucose 01/08/2020 306*     Sodium 01/09/2020 142      Potassium 01/09/2020 3.5      Chloride 01/09/2020 108*     CO2 01/09/2020 23      Anion Gap, Calculation 01/09/2020 11      Glucose 01/09/2020 113      Calcium 01/09/2020 9.1      BUN 01/09/2020 18      Creatinine 01/09/2020 0.68      GFR MDRD Af Amer 01/09/2020 >60      GFR MDRD Non Af Amer 01/09/2020 >60      WBC 01/08/2020 8.2      RBC 01/08/2020 3.82      Hemoglobin 01/08/2020 8.4*     Hematocrit 01/08/2020 29.2*     MCV 01/08/2020 76*     MCH 01/08/2020 22.0*     MCHC 01/08/2020 28.8*     RDW 01/08/2020 17.6*     Platelets 01/08/2020 198      MPV 01/08/2020 13.2*     Neutrophils % 01/08/2020 85*     Lymphocytes % 01/08/2020 10*     Monocytes % 01/08/2020 4      Eosinophils % 01/08/2020 0      Basophils % 01/08/2020 0      Neutrophils Absolute 01/08/2020 7.0      Lymphocytes Absolute 01/08/2020 0.8      Monocytes Absolute 01/08/2020 0.3      Eosinophils Absolute 01/08/2020 0.0       Basophils Absolute 01/08/2020 0.0      Glucose 01/08/2020 258*     Glucose 01/08/2020 226*     VENTRICULAR RATE 01/09/2020 92      ATRIAL RATE 01/09/2020 92      P-R INTERVAL 01/09/2020 144      QRS DURATION 01/09/2020 88      Q-T INTERVAL 01/09/2020 378      QTC CALCULATION (BEZET) 01/09/2020 467      P Axis 01/09/2020 36      R AXIS 01/09/2020 1      T AXIS 01/09/2020 27      MUSE DIAGNOSIS 01/09/2020                      Value:Normal sinus rhythm  Normal ECG  When compared with ECG of 06-JAN-2020 11:35,  No significant change was found  Confirmed by MIGUEL CALDERON MD LOC: [id number]) on 1/9/2020 2:09:15 PM       Glucose 01/09/2020 115      WBC 01/09/2020 11.6*     RBC 01/09/2020 4.09      Hemoglobin 01/09/2020 9.1*     Hematocrit 01/09/2020 30.7*     MCV 01/09/2020 75*     MCH 01/09/2020 22.2*     MCHC 01/09/2020 29.6*     RDW 01/09/2020 17.4*     Platelets 01/09/2020 219      MPV 01/09/2020 12.7*     Neutrophils % 01/09/2020 70      Lymphocytes % 01/09/2020 24      Monocytes % 01/09/2020 5      Eosinophils % 01/09/2020 0      Basophils % 01/09/2020 0      Neutrophils Absolute 01/09/2020 8.1*     Lymphocytes Absolute 01/09/2020 2.7      Monocytes Absolute 01/09/2020 0.6      Eosinophils Absolute 01/09/2020 0.0      Basophils Absolute 01/09/2020 0.0    Admission on 12/21/2019, Discharged on 12/22/2019   Component Date Value     VENTRICULAR RATE 12/21/2019 80      ATRIAL RATE 12/21/2019 80      P-R INTERVAL 12/21/2019 154      QRS DURATION 12/21/2019 96      Q-T INTERVAL 12/21/2019 388      QTC CALCULATION (BEZET) 12/21/2019 447      P Axis 12/21/2019 18      R AXIS 12/21/2019 -8      T AXIS 12/21/2019 30      MUSE DIAGNOSIS 12/21/2019                      Value:Normal sinus rhythm  Normal ECG  When compared with ECG of 16-NOV-2019 11:41,  No significant change was found  Confirmed by SEE ED PROVIDER NOTE FOR, ECG INTERPRETATION (4000),  MuseAdmin, MuseAdmin ([id number]) on 12/23/2019 12:10:11  PM       Sodium  12/21/2019 140      Potassium 12/21/2019 3.4*     Chloride 12/21/2019 105      CO2 12/21/2019 24      Anion Gap, Calculation 12/21/2019 11      Glucose 12/21/2019 150*     BUN 12/21/2019 6*     Creatinine 12/21/2019 0.84      GFR MDRD Af Amer 12/21/2019 >60      GFR MDRD Non Af Amer 12/21/2019 >60      Bilirubin, Total 12/21/2019 0.5      Calcium 12/21/2019 9.2      Protein, Total 12/21/2019 7.4      Albumin 12/21/2019 3.9      Alkaline Phosphatase 12/21/2019 96      AST 12/21/2019 16      ALT 12/21/2019 12      Troponin I 12/21/2019 <0.01      Magnesium 12/21/2019 1.9      WBC 12/21/2019 8.8      RBC 12/21/2019 4.52      Hemoglobin 12/21/2019 10.1*     Hematocrit 12/21/2019 34.0*     MCV 12/21/2019 75*     MCH 12/21/2019 22.3*     MCHC 12/21/2019 29.7*     RDW 12/21/2019 17.2*     Platelets 12/21/2019 269      MPV 12/21/2019 12.3      Influenza  A, Rapid Anti* 12/21/2019 No Influenza A antigen detected      Influenza B, Rapid Antig* 12/21/2019 No Influenza B antigen detected      Lipase 12/21/2019 18      BNP 12/21/2019 16      Troponin I 12/22/2019 0.03      Troponin I 12/22/2019 <0.01      LV volume diastolic 12/22/2019 64.8      LV volume systolic 12/22/2019 23.9      HR 12/22/2019 74      IVSd 12/22/2019 0.953*     LVIDd 12/22/2019 4.25      LVIDs 12/22/2019 2.73      LVOT diam 12/22/2019 2      LVOT mean gradient 12/22/2019 4      LVOT peak VTI 12/22/2019 24.8      LVOT mean dahiana 12/22/2019 87.2      LVOT peak dahiana 12/22/2019 137      LVOT peak gradient 12/22/2019 8      LV PWd 12/22/2019 0.824      MV E' lat dahiana 12/22/2019 10.1      MV E' med dahiana 12/22/2019 7.72      AV mean d[place] 12/22/2019 83.8      AV mean gradient 12/22/2019 3      AV VTI 12/22/2019 25.8      AV peak dahiana 12/22/2019 128      AO root 12/22/2019 3.6      AO ascending 12/22/2019 3.5      LA size 12/22/2019 3.5      LA length 12/22/2019 5      LA/AO root ratio 12/22/2019 0.972      MV decel slope 12/22/2019 2,730      MV decel slope 12/22/2019  2,730      MV decel time 12/22/2019 224      MV P 1/2 time 12/22/2019 66      MV peak A dahiana 12/22/2019 60.3      MV peak E dahiana 12/22/2019 61.4      LA area 2 12/22/2019 15.4      LA area 1 12/22/2019 16.2      BSA 12/22/2019 1.59      [place] 12/22/2019 64      Weight 12/22/2019 1,990.4      BP 12/22/2019 114/59      IVS/PW ratio 12/22/2019 1.2      LV FS 12/22/2019 35.8      Echo LVEF calculated 12/22/2019 63      LA volume 12/22/2019 42.4      LV mass 12/22/2019 118.9      AV area 12/22/2019 3.0      AV DIM IND dahiana 12/22/2019 1.1      MV area p 1/2 time 12/22/2019 3.3      MV E/A Ratio 12/22/2019 1.0      LVOT area 12/22/2019 3.14      LVOT SV 12/22/2019 77.9      AV peak gradient 12/22/2019 6.6      LV systolic volume index 12/22/2019 15.0      LV diastolic volume index 12/22/2019 40.8      LA volume index 12/22/2019 26.7      LV mass index 12/22/2019 74.8      LV SVi 12/22/2019 49.0      TAPSE 12/22/2019 1.6      MV med E/e' ratio 12/22/2019 8.0      MV lat E/e' ratio 12/22/2019 6.1      LV CO 12/22/2019 5.8      LV Ci 12/22/2019 3.6      Height 12/22/2019 64.0      Weight 12/22/2019 124      MV Avg E/e' Ratio 12/22/2019 6.9      AV DIM IND VTI 12/22/2019 1.0      Sodium 12/22/2019 139      Potassium 12/22/2019 3.5      Chloride 12/22/2019 105      CO2 12/22/2019 27      Anion Gap, Calculation 12/22/2019 7      Glucose 12/22/2019 243*     Calcium 12/22/2019 9.1      BUN 12/22/2019 8      Creatinine 12/22/2019 0.78      GFR MDRD Af Amer 12/22/2019 >60      GFR MDRD Non Af Amer 12/22/2019 >60      Glucose 12/22/2019 121      Magnesium 12/22/2019 2.0      CT chest images reviewed and interpreted by me: No pulmonary emboli.  Bring him is essentially clear.    Chest x-ray images reviewed and interpreted by me: Lungs essentially clear.  Xr Chest 2 Views    Result Date: 1/6/2020  EXAM: XR CHEST 2 VIEWS LOCATION: [facility] DATE/TIME: 1/6/2020 12:42 PM INDICATION: sob COMPARISON: 12/21/2019, CT 11/16/2019      Coronary artery stent. Calcified pleural plaque is unchanged. No acute infiltrate.    Xr Chest 2 Views    Result Date: 12/21/2019  EXAM: XR CHEST 2 VIEWS LOCATION: [facility] DATE/TIME: 12/21/2019 10:16 PM INDICATION: Dyspnea and chest pain COMPARISON: 11/16/2019     No evidence for new focal alveolar infiltrate or consolidation. Normal heart size. Normal pulmonary vascularity. No evidence for CHF or volume overload. Cardiac stents in place. Advanced atherosclerotic vascular calcification thoracic aorta. Minimal biapical pleural scarring. Minimal degenerative changes in the spine.    Cardiac catheterization report reviewed by me: LAD lesion status post stent.

## 2021-06-05 NOTE — TELEPHONE ENCOUNTER
Refill Approved    Rx renewed per Medication Renewal Policy. Medication was last renewed on 8/8/19.    Kellie Lott, [facility] Triage/Med Refill 2/4/2020     Requested Prescriptions   Pending Prescriptions Disp Refills     INJECT EASE LANCETS 30 gauge Misc [Pharmacy Med Name: LANCETS 30G INJECT EASE MISC] 100 each 0     Sig: USE 1 EACH AS DIRECTED THREE TIMES A DAY *34 DAYS*       Diabetic Supplies Refill Protocol Passed - 2/4/2020  1:10 PM        Passed - Visit with PCP or prescribing provider visit in last 6 months     Last office visit with prescriber/PCP: Visit date not found OR same dept: 1/16/2020 Adrien Cardoza MD OR same specialty: 1/16/2020 Adrien Cardoza MD  Last physical: Visit date not found Last MTM visit: 5/21/2019 Malu Muñoz, PharmD   Next visit within 3 mo: Visit date not found  Next physical within 3 mo: Visit date not found  Prescriber OR PCP: Giselle Silva MD  Last diagnosis associated with med order: There are no diagnoses linked to this encounter.  If protocol passes may refill for 12 months if within 3 months of last provider visit (or a total of 15 months).             Passed - A1C in last 6 months     Hemoglobin A1c   Date Value Ref Range Status   01/07/2020 6.3 (H) 4.2 - 6.1 % Final

## 2021-06-05 NOTE — PROGRESS NOTES
TCM DISCHARGE FOLLOW UP CALL    Discharge Date:  1/9/2020  Reason for hospital stay (discharge diagnosis)::  Burning chest pain, dizziness  Are you feeling better, the same or worse since your discharge?:  Patient is feeling better (Dtr-in-law states pt has occas chest burning, she took antacid this AM and it helped. Has occas dizziness., denies nausea. )  Do you feel like you have a plan in the event of a health emergency?: Yes (Family)    As part of your discharge plan, were  home care services ordered for you?: Yes    Have you seen them yet, or are they scheduled to visit?: Yes (tomorrow. Instructed pt's dtr-in-law that exercises being taught by PT need to be done daily and not to stop once therapies are done. they need to be continued for the rest of pt;'s life)    Do you have any follow up visits scheduled with your PCP or Specialist?:  Yes, with PCP and Yes, with Specialist  (RN) Is PCP appt scheduled soon enough (within 14 days of discharge date)?: Yes (1/16)    Who are you seeing and when is it scheduled?:  Pulm 1/24

## 2021-06-05 NOTE — TELEPHONE ENCOUNTER
Requesting verbal okay for follow up home PT visits 2w3 to address LE strengthening, gait, transfers, and balance/coordination.    Also requesting SN for medication setup.    MED REC:    . The following types of drug interactions were found: 1 severe drug interaction: Drug-Drug: clopidogrel, omeprazole and clopidogrel.  Concurrent use of esomeprazole, omeprazole, or cimetidine may result in decreased clopidogrel effectiveness, resulting in increased risk of adverse cardiac events    Please reply to clinical coordinator if appropriate.

## 2021-06-05 NOTE — TELEPHONE ENCOUNTER
Requesting verbal okay for follow up home PT visits 2w3 to address LE strengthening, gait, transfers, and balance/coordination.        Above requested order approved.    Dr. dArien Cardoza  1/16/2020 12:01 PM

## 2021-06-05 NOTE — TELEPHONE ENCOUNTER
RN Triage:    Spoke with son, Rhys, about 50 yr old Kulwant, with signed consent to communicate in chart.    Pt c/o:    Continued burning symptoms in chest which comes and goes.    Hospitalized at [facility] for burning chest pain, COPD and anemia  1/6/20 to 1/9/20.  Pain thought to be reflux related.    Pt is taking advanced Maalox and Carafate as ordered.    Last night was very uncomfortable rated pain an 8 on a  0-10 pain scale.    This morning rates pain a 6 on a 0-10 pain scale.    Dizziness continues but is not worse than when discharged from the hospital.    Follow up OV 1/16/20.  EGD outpatient was recommended upon discharge to r/o ulcer.    PLAN:  Protocol advises 911.  Informed son.  Pt with recent hospital discharge for same symptoms.  Will consult with PCP or covering MD regarding preference for level of care or other instructions.  Please advise.      Reason for Disposition    Chest pain lasting longer than 5 minutes and ANY of the following:* Over 50 years old* Over 30 years old and at least one cardiac risk factor (i.e., high blood pressure, diabetes, high cholesterol, obesity, smoker or strong family history of heart disease)* Pain is crushing, pressure-like, or heavy * Took nitroglycerin and chest pain was not relieved* History of heart disease (i.e., angina, heart attack, bypass surgery, angioplasty, CHF)     Hospitalized at [facility] 1/6/20 to 1/9/20 for COPD and burning chest pain determined to be possible GERD.    Protocols used: CHEST PAIN-A-OH

## 2021-06-05 NOTE — PROGRESS NOTES
Hospital Follow-up Visit:    Assessment/Plan:     1. Hospital discharge follow-up  1/6/20- 1/9/20    2. Chest pain, unspecified type  Cardiac cause ruled out.  Chest pain improved.    3. Type 2 diabetes mellitus treated without insulin (H)  No med changes today.  A1c less than 6.5.  - Microalbumin, Random Urine    4. Essential hypertension  Stable.    5. Moderate persistent asthma, unspecified whether complicated  Pulmonology appointment scheduled on 01/24/2020 instructed to keep that appointment.     Subjective:     Kulwant Amin is a 50 y.o. female with multiple medical conditions including hypertension, diabetes, coronary artery disease s/p stent placement, hyperlipidemia and others here for hospital discharge follow-up.  She has multiple ER visits and admission in the recent months.  Last one was from 1/6/2022 1/9/2020 due to chest pain.  Cardiac cause was ruled out.  Was diagnosed with acute asthma exacerbations.  Home PT started last week.  She states she is feeling better after recent discharge.  She feels like she is back to her baseline.  No acute chest pain but still complaining of heartburn symptoms.  She was last seen by GI on 11/1/2019.  Recommended PRN follow-up.  GI recommended to take omeprazole and Carafate.  She takes all her medications as prescribed.    Hospital/Nursing Home/IP Rehab Facility: [facility]  Date of Admission: 1/6/2020   Date of Discharge:1/9/2020  Reason(s) for Admission:Asthma             Do you have any problems taking your medication regularly?  None       Have you had any changes in your medication since discharge? None       Have you had any difficulty following your discharge or treatment plan?  No    Summary of hospitalization:  Hospital discharge summary reviewed  Diagnostic Tests/Treatments reviewed.  Follow up needed: Pulm follow Up  Other Healthcare Providers Involved in Patient's Care:   Update since discharge: {improved       Post Discharge Medication  "Reconciliation: discharge medications reconciled, continue medications without change  Plan of care communicated with: patient, significant other and daughter in law    Objective:     Vitals:    01/16/20 1128   BP: 112/68   Pulse: 88   Temp: 97.9  F (36.6  C)   TempSrc: Oral   SpO2: 98%   Weight: 126 lb 8 oz (57.4 kg)   Height: 5' 0.63\" (1.54 m)         Physical Exam:  Gen - alert, orientated, NAD  Eyes - fundascopic exam limited by the undialated pupil but looks symmetric  ENT - oropharynx clear, TMs clear  Neck - supple, no palpable mass or lymphadenopathy  CV - RRR, no murmur  Resp - lungs CTA  Ab - soft, nontender, no palpable mass or organomegaly  Extrem - warm, no edema  Neuro - CN II-XII intact, strength, sensation, reflexes intact and symmetric  Skin - no rash.  No atypical appearing lesions seen.         Coding guidelines for this visit:  Type of Medical   Decision Making Face-to-Face Visit       within 7 Days of discharge Face-to-Face Visit        within 14 days of discharge   Moderate Complexity [id number]   High Complexity [id number]       Electronically signed by Adrien Cardoza MD 01/16/20 11:29 AM     "

## 2021-06-05 NOTE — PATIENT INSTRUCTIONS - HE
1. Please increase your Omeprazole dose to 40mg daily. Please double your 20mg tablets until they run out and then  a new prescription for the 40mg tablets.  2. Please have a snack around 8PM and try to be in bed by 10PM so that you might be able to sleep.  3. If you are feeling more shortness of breath, chest tightness or wheezing please call my clinic and talk to my nurse Enedelia Pinto or Misty Art at [phone]

## 2021-06-05 NOTE — TELEPHONE ENCOUNTER
Called [facility], and no appts scheduled with them. No appts w/ [facility] as well. I left a msg on pt's vml and relayed that no appt for EGD made with either clinics unless pt is scheduled elsewhere that we don't know about. Asked to callback if any questions.

## 2021-06-05 NOTE — TELEPHONE ENCOUNTER
Call from patient's daughter, Billy.  States her mom has had increased coughing and is requesting something for the continual cough.     Reviewed with Dr. Duong: will prescribe prednisone 40mg daily x 5 days and tesselon perles

## 2021-06-05 NOTE — PATIENT INSTRUCTIONS - HE
Take 4 tabs daily for 5 days    Then 2 tabs daily for 5 days    Then 1 tabs daily for 5 days    Then stop.    Please continue to take your Omeprazole regularly.    I will also give you a prescription for an Ipratropium nebulizer that I would like you to take 3 times a day with your albuterol nebulizer. Once the coughing resolves, you can stop this.

## 2021-06-05 NOTE — TELEPHONE ENCOUNTER
Patient was receiving home care services from another agency previous to the most recent hospitalization. This information was not known upon completion of SOC assessment performed by this writer.  Hence, patient's assessment was marked in error and the orders that were previously requested are not necessary. Thank you.

## 2021-06-05 NOTE — TELEPHONE ENCOUNTER
----- Message from Jose Rafael Meadows MD sent at 1/13/2020  9:41 PM CST -----  There is a note by Dr. Silva in the phone note from 10/13/2019 that the patient is scheduled for endoscopy in four days.  The patient and daughter are unaware of this.  Can you verify whether this is correct and notify the patient please.

## 2021-06-05 NOTE — TELEPHONE ENCOUNTER
Call from patient's son.  States that patient has ongoing cough that is not getting any better.  She has completed 5 days of prednisone 40mg daily and is using the tesselon perles as prescribed.  She does not feel the prednisone has helped the cough at all.      Scheduled for ov tomorrow with Dr Duong.

## 2021-06-05 NOTE — TELEPHONE ENCOUNTER
Called and spoke with daughter-in-law and scheduled apt with Dr. Meadows for 640 pm today.  Thanks.

## 2021-06-05 NOTE — TELEPHONE ENCOUNTER
Please schedule with a clinic provider today if possible.   He does have a h/o cardiac stent and SVT but he also has had a normal echo, stress test, EKG in the last 6 months and 2 ER visits. His EGD is scheduled in 4 days and given his hx of ulcers this could be the cause.   However, he should be evaluated to make sure.     He should go to the ER if pain is worsening.     Dr. Giselle Silva  1/13/2020

## 2021-06-05 NOTE — PROGRESS NOTES
Assessment/Plan:    48-year-old woman with history of organic fuel exposure in the home.  Her PFTs have been previously noted not to be diagnostic due to poor effort was recently seen in the emergency room likely asthma exacerbation; iron deficient with significant anemia and is not entirely clear how significantly her anemia is contributing to her shortness of breath.  She is compliant with her inhalers but has significant nighttime symptoms in addition to daytime symptoms which would signal an asthma exacerbation.  For the present we would recommend;    Will place her on a protracted prednisone taper of 20 mg with reduction to 10 mg in 5 days and then 5 mg for 5 days.    Continue Breo Ellipta 1 puff daily.  She knows to gargle after using this.    Continue Spiriva daily.    Continue omeprazole dose to 40 mg daily.    Reminded her that she could use her albuterol up to 4 times a day for rescue; also added on ipratropium nebulizers 3 times a day.    I explained to her and her daughter in law who is here with her today that if her symptoms did not improve, that she should call us.    I have arranged for a 4-week follow-up, however, if her symptoms improve she knows that she can cancel this appointment she already has a follow-up set up with me in 3 months.    Tamra Scruggsqvi  Pulmonary and Critical Care  [id number]      Subjective:    Patient ID: Kulwant Amin is a 50 y.o. female.    Ms. Amin is a 49 y.o.female with a past medical history significant for anxiety, arthritis, questionable asthma versus COPD, diabetes, hypertension and a prior history of SVT presents with a follow-up visit for her pulmonary complaints.  She was last seen by me in clinic just a couple of weeks ago and at that time had demonstrated good compliance with her Spiriva, Breo and albuterol nebs.  Since her last visit, she has been struggling with significant nonproductive cough that wakes her up particularly in the middle of the night.  This is not  associated with fevers or chills.  She had called the clinic with these complaints and had been administered a 5-day course of prednisone and Tessalon Perles which she does not think significantly alleviated her symptoms.        Cough   Associated symptoms include coughing.     Pertinent past medical history:  48-year-old female here for follow-up.  Her breathing is a bit worse than 6 months ago.  She had multiple ER visits and evaluations.  This includes negative PE study.  She had a cath with a 75% LAD lesion which was intervened upon.  Since her catheterization she feels she has more heartburn.  Her breathing is worse with exertion.  Improves with rest.  It is also worse with cold weather.  Warm weather and humid air does not bother.  Symptoms localized to the chest.  Pertinent positives include burning sensation in the chest.  Pertinent negatives include no fever or hemoptysis.    She is relatively inactive in the home.  She does report doing her cardiac rehab.  She tolerated this okay.    Review of Systems   Respiratory: Positive for cough.      Pertinent as noted in HPI.        Objective:    Physical Exam   Constitutional: She is oriented to person, place, and time. No distress.   Appears older than stated age.  Fatigue.   HENT:   Head: Normocephalic and atraumatic.   Nose: Nose normal.   Eyes: Pupils are equal, round, and reactive to light. Conjunctivae are normal. Right eye exhibits no discharge. Left eye exhibits no discharge. No scleral icterus.   Neck: Normal range of motion. No tracheal deviation present. No thyromegaly present.   Cardiovascular: Normal rate, regular rhythm and normal heart sounds. Exam reveals no gallop.   No murmur heard.  Pulmonary/Chest: Effort normal. No stridor. No respiratory distress. She has wheezes.   Lymphadenopathy:     She has no cervical adenopathy.   Neurological: She is alert and oriented to person, place, and time.   Skin: Skin is warm and dry. She is not diaphoretic. No  erythema.   Psychiatric: Her behavior is normal. Thought content normal.   Depressed affect   Nursing note and vitals reviewed.          Current Outpatient Medications on File Prior to Visit   Medication Sig Dispense Refill     acetaminophen (TYLENOL) 500 MG tablet Take 2 tablets (1,000 mg total) by mouth every 8 (eight) hours as needed for pain. 90 tablet 10     albuterol (PROVENTIL) 2.5 mg /3 mL (0.083 %) nebulizer solution Take 3 mL (2.5 mg total) by nebulization every 6 (six) hours as needed for wheezing. 150 mL 12     albuterol (VENTOLIN HFA) 90 mcg/actuation inhaler Inhale 2 puffs every 6 (six) hours as needed for wheezing. 1 Inhaler 3     aluminum-magnesium hydroxide-simethicone (MAALOX ADVANCED) 200-200-20 mg/5 mL Susp Take 30 mL by mouth every 6 (six) hours as needed (abdominal pain or bloating). 900 mL 2     amitriptyline (ELAVIL) 10 MG tablet Take 1 tablet (10 mg total) by mouth at bedtime. 30 tablet 0     aspirin 81 MG EC tablet Take 1 tablet (81 mg total) by mouth daily. 30 tablet 0     atorvastatin (LIPITOR) 80 MG tablet Take 1 tablet (80 mg total) by mouth at bedtime. 90 tablet 3     benzonatate (TESSALON) 100 MG capsule Take 1 capsule (100 mg total) by mouth 3 (three) times a day as needed for cough. 90 capsule 0     blood glucose meter (GLUCOMETER) Use 1 each As Directed 3 (three) times a day. Dispense glucometer brand per patient's insurance at pharmacy discretion.(E11.9) 1 each 0     blood glucose test strips Use 1 each As Directed 3 (three) times a day. Dispense brand per patient's insurance at pharmacy discretion.(E11.9) 300 strip 3     clopidogrel (PLAVIX) 75 mg tablet Take 1 tablet (75 mg total) by mouth daily. 30 tablet 0     cyanocobalamin 1000 MCG tablet Take 1 tablet (1,000 mcg total) by mouth daily. 30 tablet 0     ferrous sulfate 325 (65 FE) MG tablet Take 1 tablet (325 mg total) by mouth daily with breakfast. 30 tablet 1     fluticasone furoate-vilanterol (BREO ELLIPTA) 200-25 mcg/dose  DsDv inhaler Inhale 1 puff daily. 1 each 12     generic lancets Use 1 each As Directed 3 (three) times a day. Dispense brand per patient's insurance at pharmacy discretion.(E11.9) 300 each 3     hydroCHLOROthiazide (MICROZIDE) 12.5 mg capsule Take 1 capsule (12.5 mg total) by mouth daily. 30 capsule 0     INJECT EASE LANCETS 30 gauge Misc USE 1 EACH AS DIRECTED THREE TIMES A DAY *34 DAYS* 100 each 11     insulin aspart U-100 (NOVOLOG) 100 unit/mL injection Inject under the skin 3 (three) times a day with meals. Use per sliding scale < 150 - No insulin, 151-200 use 3 U, 201- 250 use 5 U,   251- 300 use 8 U        metFORMIN (GLUCOPHAGE) 500 MG tablet TAKE 1 TABLET (500 MG TOTAL) BY MOUTH 2 TIMES A DAY WITH MEALS FOR DIABETES 60 tablet 0     metoprolol tartrate (LOPRESSOR) 25 MG tablet Take 1 tablet (25 mg total) by mouth 2 (two) times a day. 30 tablet 0     montelukast (SINGULAIR) 10 mg tablet TAKE 1 PILL (10 MG TOTAL) BY MOUTH AT BEDTIME FOR ASTHMA 30 tablet 10     omeprazole (PRILOSEC) 20 MG capsule Take 2 capsules (40 mg total) by mouth daily before breakfast. 30 capsule 12     polyethylene glycol (GLYCOLAX) 17 gram/dose powder MIX 17 GRAMS WITH LIQUID AND DRINK ONCE DAILY. Hold for loose stools. 510 g 12     sucralfate (CARAFATE) 1 gram tablet Take 1 tablet (1 g total) by mouth 4 (four) times a day before meals and at bedtime. Take 1 hour prior to meals 120 tablet 1     tiotropium (SPIRIVA) 18 mcg inhalation capsule Place 1 capsule (2 puffs total) into inhaler and inhale daily. 30 capsule 11     predniSONE (DELTASONE) 20 MG tablet Take 2 tabs (40mg total) daily x 5 days. 10 tablet 0     No current facility-administered medications on file prior to visit.      BP 98/66   Pulse 78   Resp 19   Wt 128 lb (58.1 kg)   SpO2 99% Comment: RA  BMI 24.19 kg/m      Medical History  Active Ambulatory (Non-Hospital) Problems    Diagnosis     Dizziness     Acute asthma exacerbation     Microcytic anemia     Latent  tuberculosis     SOB (shortness of breath)     Blood in stool     Lower GI bleeding     Chest pain     Rectal bleed     Hyperlipidemia     Cataract     Moderate persistent asthma     Migraine headache     S/P coronary artery stent placement     Type 2 diabetes mellitus treated without insulin (H)     CAD (coronary artery disease)     SVT (supraventricular tachycardia) (H)     Chest wall pain     GERD (gastroesophageal reflux disease)     HTN (hypertension)     Asthma exacerbation     Past Medical History:   Diagnosis Date     Acute blood loss anemia      Anxiety      Arthritis      Asthma      Chest wall pain 12/26/2017     COPD (chronic obstructive pulmonary disease) (H)      Depression      Diabetes mellitus (H)      Diabetes mellitus, type II (H)      Generalized headaches      GERD (gastroesophageal reflux disease)      High cholesterol      History of anesthesia complications      HTN (hypertension)      Lactic acid acidosis 11/23/2018     Pneumonia      SVT (supraventricular tachycardia) (H)      Unstable angina (H) 10/5/2018        Surgical History  She  has a past surgical history that includes Coronary Angiogram (N/A, 1/25/2018); pr colonoscopy flx dx w/collj spec when pfrmd (N/A, 12/10/2018); pr esophagogastroduodenoscopy transoral diagnostic (N/A, 12/10/2018); and Coronary stent placement.       Social History  Reviewed, she lives at home with her family.   Allergies  No Known Allergies                             Data Review - imaging, labs, and ekgs listed below were reviewed by me.  Chest XRay and chest CT images and EKG tracings interpreted personally.     Past Labs  Office Visit on 01/16/2020   Component Date Value     Microalbumin, Random Uri* 01/16/2020 <0.50      Creatinine, Urine 01/16/2020 28.4      Microalbumin/Creatinine * 01/16/2020     Admission on 01/06/2020, Discharged on 01/09/2020   Component Date Value     Troponin I 01/06/2020 <0.01      BNP 01/06/2020 41      INR 01/06/2020 0.96       PTT 01/06/2020 28      Sodium 01/06/2020 143      Potassium 01/06/2020 3.6      Chloride 01/06/2020 108*     CO2 01/06/2020 26      Anion Gap, Calculation 01/06/2020 9      Glucose 01/06/2020 92      Calcium 01/06/2020 9.4      BUN 01/06/2020 10      Creatinine 01/06/2020 0.67      GFR MDRD Af Amer 01/06/2020 >60      GFR MDRD Non Af Amer 01/06/2020 >60      VENTRICULAR RATE 01/06/2020 76      ATRIAL RATE 01/06/2020 76      P-R INTERVAL 01/06/2020 146      QRS DURATION 01/06/2020 94      Q-T INTERVAL 01/06/2020 398      QTC CALCULATION (BEZET) 01/06/2020 447      P [place] 01/06/2020 3      R AXIS 01/06/2020 -1      T AXIS 01/06/2020 33      MUSE DIAGNOSIS 01/06/2020                      Value:Normal sinus rhythm  Normal ECG  When compared with ECG of 21-DEC-2019 21:30,  No significant change was found  Confirmed by SEE ED PROVIDER NOTE FOR, ECG INTERPRETATION ([id number]),  Roberth Lepe ([id number]) on 1/14/2020 6:58:29 PM       Influenza  A, Rapid Anti* 01/06/2020 No Influenza A antigen detected      Influenza B, Rapid Antig* 01/06/2020 No Influenza B antigen detected      D-Dimer, Quant 01/06/2020 <0.27      WBC 01/06/2020 8.8      RBC 01/06/2020 4.47      Hemoglobin 01/06/2020 9.9*     Hematocrit 01/06/2020 33.6*     MCV 01/06/2020 75*     MCH 01/06/2020 22.1*     MCHC 01/06/2020 29.5*     RDW 01/06/2020 17.4*     Platelets 01/06/2020 218      MPV 01/06/2020 12.8*     Neutrophils % 01/06/2020 64      Lymphocytes % 01/06/2020 25      Monocytes % 01/06/2020 6      Eosinophils % 01/06/2020 4      Basophils % 01/06/2020 1      Neutrophils Absolute 01/06/2020 5.6      Lymphocytes Absolute 01/06/2020 2.2      Monocytes Absolute 01/06/2020 0.5      Eosinophils Absolute 01/06/2020 0.4      Basophils Absolute 01/06/2020 0.0      Glucose 01/06/2020 145*     Lactic Acid 01/06/2020 7.6*     pH, Arterial 01/06/2020 7.39      pCO2, Arterial 01/06/2020 29*     pO2, Arterial 01/06/2020 82      Bicarbonate, Arterial Ca*  01/06/2020 19.9*     O2 Sat, Arterial 01/06/2020 97.3*     Oxyhemoglobin 01/06/2020 95.8*     Base Excess, Arterial Ca* 01/06/2020 -6.5      Ventilation Mode 01/06/2020 Room Air      Sample Stabilized Temper* 01/06/2020 37.0      Lactic Acid 01/06/2020 5.2*     Troponin I 01/06/2020 0.01      Troponin I 01/07/2020 <0.01      Hemoglobin A1c 01/07/2020 6.3*     Sodium 01/07/2020 142      Potassium 01/07/2020 3.7      Chloride 01/07/2020 113*     CO2 01/07/2020 19*     Anion Gap, Calculation 01/07/2020 10      Glucose 01/07/2020 256*     Calcium 01/07/2020 8.5      BUN 01/07/2020 12      Creatinine 01/07/2020 0.73      GFR MDRD Af Amer 01/07/2020 >60      GFR MDRD Non Af Amer 01/07/2020 >60      Lactic Acid 01/07/2020 2.9*     Glucose 01/07/2020 137      WBC 01/07/2020 10.2      RBC 01/07/2020 3.75*     Hemoglobin 01/07/2020 8.4*     Hematocrit 01/07/2020 28.4*     MCV 01/07/2020 76*     MCH 01/07/2020 22.4*     MCHC 01/07/2020 29.6*     RDW 01/07/2020 17.1*     Platelets 01/07/2020 188      MPV 01/07/2020 12.5      Neutrophils % 01/07/2020 89*     Lymphocytes % 01/07/2020 8*     Monocytes % 01/07/2020 3      Eosinophils % 01/07/2020 0      Basophils % 01/07/2020 0      Neutrophils Absolute 01/07/2020 9.1*     Lymphocytes Absolute 01/07/2020 0.8      Monocytes Absolute 01/07/2020 0.3      Eosinophils Absolute 01/07/2020 0.0      Basophils Absolute 01/07/2020 0.0      Glucose 01/07/2020 149*     Glucose 01/07/2020 126      Magnesium 01/07/2020 1.9      Lactic Acid 01/07/2020 1.8      Glucose 01/07/2020 204*     Platelets 01/08/2020 198      Glucose 01/08/2020 169*     Glucose 01/08/2020 306*     Sodium 01/09/2020 142      Potassium 01/09/2020 3.5      Chloride 01/09/2020 108*     CO2 01/09/2020 23      Anion Gap, Calculation 01/09/2020 11      Glucose 01/09/2020 113      Calcium 01/09/2020 9.1      BUN 01/09/2020 18      Creatinine 01/09/2020 0.68      GFR MDRD Af Amer 01/09/2020 >60      GFR MDRD Non Af Amer 01/09/2020  >60      WBC 01/08/2020 8.2      RBC 01/08/2020 3.82      Hemoglobin 01/08/2020 8.4*     Hematocrit 01/08/2020 29.2*     MCV 01/08/2020 76*     MCH 01/08/2020 22.0*     MCHC 01/08/2020 28.8*     RDW 01/08/2020 17.6*     Platelets 01/08/2020 198      MPV 01/08/2020 13.2*     Neutrophils % 01/08/2020 85*     Lymphocytes % 01/08/2020 10*     Monocytes % 01/08/2020 4      Eosinophils % 01/08/2020 0      Basophils % 01/08/2020 0      Neutrophils Absolute 01/08/2020 7.0      Lymphocytes Absolute 01/08/2020 0.8      Monocytes Absolute 01/08/2020 0.3      Eosinophils Absolute 01/08/2020 0.0      Basophils Absolute 01/08/2020 0.0      Glucose 01/08/2020 258*     Glucose 01/08/2020 226*     VENTRICULAR RATE 01/09/2020 92      ATRIAL RATE 01/09/2020 92      P-R INTERVAL 01/09/2020 144      QRS DURATION 01/09/2020 88      Q-T INTERVAL 01/09/2020 378      QTC CALCULATION (BEZET) 01/09/2020 467      P Axis 01/09/2020 36      R AXIS 01/09/2020 1      T AXIS 01/09/2020 27      MUSE DIAGNOSIS 01/09/2020                      Value:Normal sinus rhythm  Normal ECG  When compared with ECG of 06-JAN-2020 11:35,  No significant change was found  Confirmed by YUMIKO COLORADO, MIGUEL LOC: [id number]) on 1/9/2020 2:09:15 PM       Glucose 01/09/2020 115      WBC 01/09/2020 11.6*     RBC 01/09/2020 4.09      Hemoglobin 01/09/2020 9.1*     Hematocrit 01/09/2020 30.7*     MCV 01/09/2020 75*     MCH 01/09/2020 22.2*     MCHC 01/09/2020 29.6*     RDW 01/09/2020 17.4*     Platelets 01/09/2020 219      MPV 01/09/2020 12.7*     Neutrophils % 01/09/2020 70      Lymphocytes % 01/09/2020 24      Monocytes % 01/09/2020 5      Eosinophils % 01/09/2020 0      Basophils % 01/09/2020 0      Neutrophils Absolute 01/09/2020 8.1*     Lymphocytes Absolute 01/09/2020 2.7      Monocytes Absolute 01/09/2020 0.6      Eosinophils Absolute 01/09/2020 0.0      Basophils Absolute 01/09/2020 0.0    Admission on 12/21/2019, Discharged on 12/22/2019   Component Date Value      VENTRICULAR RATE 12/21/2019 80      ATRIAL RATE 12/21/2019 80      P-R INTERVAL 12/21/2019 154      QRS DURATION 12/21/2019 96      Q-T INTERVAL 12/21/2019 388      QTC CALCULATION (BEZET) 12/21/2019 447      P Axis 12/21/2019 18      R AXIS 12/21/2019 -8      T AXIS 12/21/2019 30      MUSE DIAGNOSIS 12/21/2019                      Value:Normal sinus rhythm  Normal ECG  When compared with ECG of 16-NOV-2019 11:41,  No significant change was found  Confirmed by SEE ED PROVIDER NOTE FOR, ECG INTERPRETATION (4000),  MuseAdmin, MuseAdmin ([id number]) on 12/23/2019 12:10:11  PM       Sodium 12/21/2019 140      Potassium 12/21/2019 3.4*     Chloride 12/21/2019 105      CO2 12/21/2019 24      Anion Gap, Calculation 12/21/2019 11      Glucose 12/21/2019 150*     BUN 12/21/2019 6*     Creatinine 12/21/2019 0.84      GFR MDRD Af Amer 12/21/2019 >60      GFR MDRD Non Af Amer 12/21/2019 >60      Bilirubin, Total 12/21/2019 0.5      Calcium 12/21/2019 9.2      Protein, Total 12/21/2019 7.4      Albumin 12/21/2019 3.9      Alkaline Phosphatase 12/21/2019 96      AST 12/21/2019 16      ALT 12/21/2019 12      Troponin I 12/21/2019 <0.01      Magnesium 12/21/2019 1.9      WBC 12/21/2019 8.8      RBC 12/21/2019 4.52      Hemoglobin 12/21/2019 10.1*     Hematocrit 12/21/2019 34.0*     MCV 12/21/2019 75*     MCH 12/21/2019 22.3*     MCHC 12/21/2019 29.7*     RDW 12/21/2019 17.2*     Platelets 12/21/2019 269      MPV 12/21/2019 12.3      Influenza  A, Rapid Anti* 12/21/2019 No Influenza A antigen detected      Influenza B, Rapid Antig* 12/21/2019 No Influenza B antigen detected      Lipase 12/21/2019 18      BNP 12/21/2019 16      Troponin I 12/22/2019 0.03      Troponin I 12/22/2019 <0.01      LV volume diastolic 12/22/2019 64.8      LV volume systolic 12/22/2019 23.9      HR 12/22/2019 74      IVSd 12/22/2019 0.953*     LVIDd 12/22/2019 4.25      LVIDs 12/22/2019 2.73      LVOT diam 12/22/2019 2      LVOT mean gradient 12/22/2019 4       LVOT peak VTI 12/22/2019 24.8      LVOT mean dah[place] 12/22/2019 87.2      LVOT peak [place] 12/22/2019 137      LVOT peak gradient 12/22/2019 8      LV PWd 12/22/2019 0.824      MV E' lat [place] 12/22/2019 10.1      MV E' med [place] 12/22/2019 7.72      AV mean [place] 12/22/2019 83.8      AV mean gradient 12/22/2019 3      AV VTI 12/22/2019 25.8      AV peak [place] 12/22/2019 128      AO root 12/22/2019 3.6      AO ascending 12/22/2019 3.5      LA size 12/22/2019 3.5      LA length 12/22/2019 5      LA/AO root ratio 12/22/2019 0.972      MV decel slope 12/22/2019 2,730      MV decel slope 12/22/2019 2,730      MV decel time 12/22/2019 224      MV P 1/2 time 12/22/2019 66      MV peak A [place] 12/22/2019 60.3      MV peak E [place] 12/22/2019 61.4      LA area 2 12/22/2019 15.4      LA area 1 12/22/2019 16.2      BSA 12/22/2019 1.59      [place] 12/22/2019 64      Weight 12/22/2019 1,990.4      BP 12/22/2019 114/59      IVS/PW ratio 12/22/2019 1.2      LV FS 12/22/2019 35.8      Echo LVEF calculated 12/22/2019 63      LA volume 12/22/2019 42.4      LV mass 12/22/2019 118.9      AV area 12/22/2019 3.0      AV DIM IND dah[place] 12/22/2019 1.1      MV area p 1/2 time 12/22/2019 3.3      MV E/A Ratio 12/22/2019 1.0      LVOT area 12/22/2019 3.14      LVOT SV 12/22/2019 77.9      AV peak gradient 12/22/2019 6.6      LV systolic volume index 12/22/2019 15.0      LV diastolic volume index 12/22/2019 40.8      LA volume index 12/22/2019 26.7      LV mass index 12/22/2019 74.8      LV SVi 12/22/2019 49.0      TAPSE 12/22/2019 1.6      MV med E/e' ratio 12/22/2019 8.0      MV lat E/e' ratio 12/22/2019 6.1      LV CO 12/22/2019 5.8      LV Ci 12/22/2019 3.6      Height 12/22/2019 64.0      Weight 12/22/2019 124      MV Avg E/e' Ratio 12/22/2019 6.9      AV DIM IND VTI 12/22/2019 1.0      Sodium 12/22/2019 139      Potassium 12/22/2019 3.5      Chloride 12/22/2019 105      CO2 12/22/2019 27      Anion Gap, Calculation 12/22/2019 7      Glucose 12/22/2019  243*     Calcium 12/22/2019 9.1      BUN 12/22/2019 8      Creatinine 12/22/2019 0.78      GFR MDRD Af Amer 12/22/2019 >60      GFR MDRD Non Af Amer 12/22/2019 >60      Glucose 12/22/2019 121      Magnesium 12/22/2019 2.0      CT chest images reviewed and interpreted by me: No pulmonary emboli.  Bring him is essentially clear.    Chest x-ray images reviewed and interpreted by me: Lungs essentially clear.  Xr Chest 2 Views    Result Date: 1/6/2020  EXAM: XR CHEST 2 VIEWS LOCATION: [facility] DATE/TIME: 1/6/2020 12:42 PM INDICATION: sob COMPARISON: 12/21/2019, CT 11/16/2019     Coronary artery stent. Calcified pleural plaque is unchanged. No acute infiltrate.    Cardiac catheterization report reviewed by me: LAD lesion status post stent.

## 2021-06-05 NOTE — PROGRESS NOTES
Chief Complaint   Patient presents with     See Triage Nota.         HPI:   Kulwant Amin is a 50 y.o. female with CAD s/p stent placement, DM, asthma, anemia with  and daughter-in-law c/o body aches and heartburn.  Heartburn is worse--more frequent.  Taking omeprazole every day. Also has liquid maalox--takes every 6 hours every day.  Doesn't seem to help.  Has been nauseated.  No vomiting.  Poor appetite.    No specific food makes it worse.  Eating seems to make it worse.  Awakens her at night.  Was seen by GI in October last year and they started sucralfate but she doesn't think it has helped.  No bloody stools, No weight loss.    Of note she had stress cardia MRI/EKG 7/2019 that was negative for inducible Myocardial ischemia, Echo12/22/2019 showed left EF 63% with mildly reduced right ventricular function.    Daughter states that GI said in October that she didn't need an endoscopy now.  However, there is another note that states she is scheduled for one in 4 days.    Normal urination.  Ongoing body aches and pains.        Medications:  Current Outpatient Medications on File Prior to Visit   Medication Sig Dispense Refill     acetaminophen (TYLENOL) 500 MG tablet Take 2 tablets (1,000 mg total) by mouth every 8 (eight) hours as needed for pain. 90 tablet 10     albuterol (PROVENTIL) 2.5 mg /3 mL (0.083 %) nebulizer solution Take 3 mL (2.5 mg total) by nebulization every 6 (six) hours as needed for wheezing. 150 mL 12     albuterol (VENTOLIN HFA) 90 mcg/actuation inhaler Inhale 2 puffs every 6 (six) hours as needed for wheezing. 1 Inhaler 3     aluminum-magnesium hydroxide-simethicone (MAALOX ADVANCED) 200-200-20 mg/5 mL Susp Take 30 mL by mouth every 6 (six) hours as needed (abdominal pain or bloating). 900 mL 2     amitriptyline (ELAVIL) 10 MG tablet Take 1 tablet (10 mg total) by mouth at bedtime. 30 tablet 0     aspirin 81 MG EC tablet Take 1 tablet (81 mg total) by mouth daily. 30 tablet 0      atorvastatin (LIPITOR) 80 MG tablet Take 1 tablet (80 mg total) by mouth at bedtime. 90 tablet 3     blood glucose meter (GLUCOMETER) Use 1 each As Directed 3 (three) times a day. Dispense glucometer brand per patient's insurance at pharmacy discretion.(E11.9) 1 each 0     blood glucose test strips Use 1 each As Directed 3 (three) times a day. Dispense brand per patient's insurance at pharmacy discretion.(E11.9) 300 strip 3     clopidogrel (PLAVIX) 75 mg tablet Take 1 tablet (75 mg total) by mouth daily. 30 tablet 0     cyanocobalamin 1000 MCG tablet Take 1 tablet (1,000 mcg total) by mouth daily. 30 tablet 0     ferrous sulfate 325 (65 FE) MG tablet Take 1 tablet (325 mg total) by mouth daily with breakfast. 30 tablet 1     fluticasone furoate-vilanterol (BREO ELLIPTA) 200-25 mcg/dose DsDv inhaler Inhale 1 puff daily. 1 each 12     generic lancets Use 1 each As Directed 3 (three) times a day. Dispense brand per patient's insurance at pharmacy discretion.(E11.9) 300 each 3     hydroCHLOROthiazide (MICROZIDE) 12.5 mg capsule Take 1 capsule (12.5 mg total) by mouth daily. 30 capsule 0     insulin aspart U-100 (NOVOLOG) 100 unit/mL injection Inject under the skin 3 (three) times a day with meals. Use per sliding scale < 150 - No insulin, 151-200 use 3 U, 201- 250 use 5 U,   251- 300 use 8 U        metFORMIN (GLUCOPHAGE) 500 MG tablet TAKE 1 TABLET (500 MG TOTAL) BY MOUTH 2 TIMES A DAY WITH MEALS FOR DIABETES 60 tablet 0     metoprolol tartrate (LOPRESSOR) 25 MG tablet Take 1 tablet (25 mg total) by mouth 2 (two) times a day. 30 tablet 0     montelukast (SINGULAIR) 10 mg tablet TAKE 1 PILL (10 MG TOTAL) BY MOUTH AT BEDTIME FOR ASTHMA 30 tablet 10     omeprazole (PRILOSEC) 20 MG capsule Take 1 capsule (20 mg total) by mouth daily before breakfast. 30 capsule 0     polyethylene glycol (GLYCOLAX) 17 gram/dose powder MIX 17 GRAMS WITH LIQUID AND DRINK ONCE DAILY. Hold for loose stools. 510 g 12     sucralfate (CARAFATE) 1  "gram tablet Take 1 tablet (1 g total) by mouth 4 (four) times a day before meals and at bedtime. Take 1 hour prior to meals 120 tablet 1     tiotropium (SPIRIVA) 18 mcg inhalation capsule Place 1 capsule (2 puffs total) into inhaler and inhale daily. 30 capsule 11     [] predniSONE (DELTASONE) 20 MG tablet Take 40 mg by mouth daily with breakfast for 2 days. 4 tablet 0     No current facility-administered medications on file prior to visit.          Social History:  Social History     Tobacco Use     Smoking status: Former Smoker     Last attempt to quit:      Years since quittin.0     Smokeless tobacco: Never Used   Substance Use Topics     Alcohol use: No         Physical Exam:   Vitals:    20 1902   BP: 104/68   Pulse: 97   Temp: 98.5  F (36.9  C)   TempSrc: Oral   SpO2: 98%   Weight: 122 lb 12 oz (55.7 kg)   Height: 5' 0.63\" (1.54 m)       GENERAL:   Alert. Oriented. Moves slowly.  Appears uncomfortable.  EYES: Clear  HENT:  Ears: R TM pearly gray. Normal landmarks. L TM pearly gray.  Normal landmarks  Nose: Clear.  Sinuses: Nontender.  Oropharynx:  No erythema. No exudate.  NECK: Supple. No adenopathy.  LUNGS: Clear to ascultation.  No crackles.  No wheezing  HEART: RRR  SKIN:  No rash.   ABDOMEN:  +BS. No tenderness. Soft, no guarding, rebound, rigidity,mass, or organomegaly. No CVA tenderness      [facility] Note 2019:  Appears to be functional abdominal pain.  Continue omeprazole, sucralfate.  No further work up planned.    Assessment/Plan:    1. Heartburn     2. Visit for screening mammogram  Mammo Screening Bilateral      C/o worsening heartburn with normal abdominal exam.  Has had negative cardiac evaluation in the last year  Will have our specialty schedulers ascertain if she is indeed scheduled for an upper endoscopy this week and notify patient.  Continue on current medications  Has follow up appointment with her primary in three days.          Jose Rafael Meadows, " MD      1/13/2020    The following portions of the patient's history were reviewed and updated as appropriate: allergies, current medications, past family history, past medical history, past social history, past surgical history and problem list.

## 2021-06-05 NOTE — TELEPHONE ENCOUNTER
New Appointment Needed  What is the reason for the visit:    Inpatient/ED Follow Up Appt Request  At what hospital or facility were you seen?: [facility]  What is the reason you were seen?: Dizziness  What date were you admitted?: date: 01/06/2020  What date were you discharged?: date: 01/09/2020  What was the recommended timeframe for your follow up appointment?: 1-2 days  Provider Preference: Any available  How soon do you need to be seen?: tomorrow  Waitlist offered?: No  Okay to leave a detailed message:  Yes

## 2021-06-06 NOTE — TELEPHONE ENCOUNTER
CMT called and left a detailed message for Billy Amin to advise to take patient to [facility] today for evaluation.

## 2021-06-06 NOTE — PROGRESS NOTES
Assessment/Plan:    48-year-old woman with history of organic fuel exposure in the home.  Her PFTs have been previously noted not to be diagnostic due to poor effort was recently seen in the emergency room likely asthma exacerbation; iron deficient with significant anemia and is not entirely clear how significantly her anemia is contributing to her shortness of breath.  She continues to be compliant with her inhalers but endorses a cough which coincided with completion of her steroid therapy.  Notably, her transthoracic echocardiogram and BNP as recently as December were noted to be unremarkable.  For the present we would recommend;    Continue Breo Ellipta 1 puff daily.  She knows to gargle after using this.    We will switch over from Spiriva to Incruse Ellipta daily.    I explained to her and her daughter in law who is here with her today that if her symptoms did not improve, that she should call us.    I have arranged for a 4-week follow-up, however, if her symptoms improve she knows that she can cancel this appointment she already has a follow-up set up with me in 3 months.    Tamra Scruggsq[facility]  Pulmonary and Critical Care  [id number]      Subjective:    Patient ID: Kulwant Amin is a 50 y.o. female.    Ms. Amin is a 49 y.o.female with a past medical history significant for anxiety, arthritis, questionable asthma versus COPD, diabetes, hypertension and a prior history of SVT presents with a follow-up visit for her pulmonary complaints.  She was last seen by me in clinic just a couple of weeks ago and at that time had demonstrated good compliance with her Spiriva, Breo and albuterol nebs.  Her last clinic visit, it was noted that she was fairly wheezy and short of breath because of which she was placed on a protracted prednisone taper.  She completed her taper about a week ago and states that around the same time developed a cough which troubles her several times a day.  She also endorses abdominal bloating associate  with loss of appetite and nausea but has had no vomiting or diarrhea.  She thinks that her coughing is worse at night and denies fevers, chills, night sweats.  In the past 4 weeks, how much of the time did your asthma keep you from getting as much done at work, school, or at home?: Most of the time  During the past 4 weeks, how often have you had shortness of breath?: More than once a day  During the past 4 weeks, how often did your asthma symptoms (wheezing, coughing, shortness of breath, chest tightness or pain) wake you up at night or earlier in the morning?: 4 or more nights a week  During the past 4 weeks, how often have you used your rescue inhaler or nebulizer medication (such as albuterol)?: 1 or 2 times per day  How would you rate your asthma control during the past 4 weeks?: Poorly controlled  ACT Total Score: (!) 8          Cough   Associated symptoms include coughing.   Asthma   She complains of cough. Her past medical history is significant for asthma.     Pertinent past medical history:  48-year-old female here for follow-up.  Her breathing is a bit worse than 6 months ago.  She had multiple ER visits and evaluations.  This includes negative PE study.  She had a cath with a 75% LAD lesion which was intervened upon.  Since her catheterization she feels she has more heartburn.  Her breathing is worse with exertion.  Improves with rest.  It is also worse with cold weather.  Warm weather and humid air does not bother.  Symptoms localized to the chest.  Pertinent positives include burning sensation in the chest.  Pertinent negatives include no fever or hemoptysis.    She is relatively inactive in the home.  She does report doing her cardiac rehab.  She tolerated this okay.    Review of Systems   Respiratory: Positive for cough.      Pertinent as noted in HPI.        Objective:    Physical Exam   Constitutional: She is oriented to person, place, and time. No distress.   Appears older than stated age.   Fatigue.   HENT:   Head: Normocephalic and atraumatic.   Nose: Nose normal.   Eyes: Pupils are equal, round, and reactive to light. Conjunctivae are normal. Right eye exhibits no discharge. Left eye exhibits no discharge. No scleral icterus.   Neck: Normal range of motion. No tracheal deviation present. No thyromegaly present.   Cardiovascular: Normal rate, regular rhythm and normal heart sounds. Exam reveals no gallop.   No murmur heard.  Pulmonary/Chest: Effort normal. No stridor. No respiratory distress. She has wheezes.   Lymphadenopathy:     She has no cervical adenopathy.   Neurological: She is alert and oriented to person, place, and time.   Skin: Skin is warm and dry. She is not diaphoretic. No erythema.   Psychiatric: Her behavior is normal. Thought content normal.   Depressed affect   Nursing note and vitals reviewed.          Current Outpatient Medications on File Prior to Visit   Medication Sig Dispense Refill     acetaminophen (TYLENOL) 500 MG tablet Take 2 tablets (1,000 mg total) by mouth every 8 (eight) hours as needed for pain. 90 tablet 10     albuterol (PROVENTIL) 2.5 mg /3 mL (0.083 %) nebulizer solution Take 3 mL (2.5 mg total) by nebulization every 6 (six) hours as needed for wheezing. 150 mL 12     albuterol (VENTOLIN HFA) 90 mcg/actuation inhaler Inhale 2 puffs every 6 (six) hours as needed for wheezing. 1 Inhaler 3     aluminum-magnesium hydroxide-simethicone (MAALOX ADVANCED) 200-200-20 mg/5 mL Susp Take 30 mL by mouth every 6 (six) hours as needed (abdominal pain or bloating). 900 mL 2     amitriptyline (ELAVIL) 10 MG tablet Take 1 tablet (10 mg total) by mouth at bedtime. 30 tablet 0     aspirin 81 MG EC tablet Take 1 tablet (81 mg total) by mouth daily. 30 tablet 0     atorvastatin (LIPITOR) 80 MG tablet Take 1 tablet (80 mg total) by mouth at bedtime. 90 tablet 3     benzonatate (TESSALON) 100 MG capsule Take 1 capsule (100 mg total) by mouth 3 (three) times a day as needed for cough.  90 capsule 0     blood glucose meter (GLUCOMETER) Use 1 each As Directed 3 (three) times a day. Dispense glucometer brand per patient's insurance at pharmacy discretion.(E11.9) 1 each 0     blood glucose test strips Use 1 each As Directed 3 (three) times a day. Dispense brand per patient's insurance at pharmacy discretion.(E11.9) 300 strip 3     clopidogrel (PLAVIX) 75 mg tablet Take 1 tablet (75 mg total) by mouth daily. 30 tablet 0     cyanocobalamin 1000 MCG tablet Take 1 tablet (1,000 mcg total) by mouth daily. 30 tablet 0     ferrous sulfate 325 (65 FE) MG tablet Take 1 tablet (325 mg total) by mouth daily with breakfast. 30 tablet 1     fluticasone furoate-vilanterol (BREO ELLIPTA) 200-25 mcg/dose DsDv inhaler Inhale 1 puff daily. 1 each 12     generic lancets Use 1 each As Directed 3 (three) times a day. Dispense brand per patient's insurance at pharmacy discretion.(E11.9) 300 each 3     hydroCHLOROthiazide (MICROZIDE) 12.5 mg capsule Take 1 capsule (12.5 mg total) by mouth daily. 30 capsule 0     INJECT EASE LANCETS 30 gauge Misc USE 1 EACH AS DIRECTED THREE TIMES A DAY *34 DAYS* 100 each 11     insulin aspart U-100 (NOVOLOG) 100 unit/mL injection Inject under the skin 3 (three) times a day with meals. Use per sliding scale < 150 - No insulin, 151-200 use 3 U, 201- 250 use 5 U,   251- 300 use 8 U        ipratropium (ATROVENT) 0.02 % nebulizer solution Take 2.5 mL (0.5 mg total) by nebulization 3 (three) times a day. 300 mL 0     metFORMIN (GLUCOPHAGE) 500 MG tablet TAKE 1 TABLET (500 MG TOTAL) BY MOUTH 2 TIMES A DAY WITH MEALS FOR DIABETES 180 tablet 2     metoprolol tartrate (LOPRESSOR) 25 MG tablet Take 1 tablet (25 mg total) by mouth 2 (two) times a day. 30 tablet 0     montelukast (SINGULAIR) 10 mg tablet TAKE 1 PILL (10 MG TOTAL) BY MOUTH AT BEDTIME FOR ASTHMA 30 tablet 10     polyethylene glycol (GLYCOLAX) 17 gram/dose powder MIX 17 GRAMS WITH LIQUID AND DRINK ONCE DAILY. Hold for loose stools. 510 g  "12     predniSONE (DELTASONE) 20 MG tablet Take 2 tabs (40mg total) daily x 5 days. 10 tablet 0     predniSONE (DELTASONE) 5 MG tablet TAKE 1 PILL (5 MG) BY MOUTH EVERYDAY 35 tablet 0     sucralfate (CARAFATE) 1 gram tablet Take 1 tablet (1 g total) by mouth 4 (four) times a day before meals and at bedtime. Take 1 hour prior to meals 120 tablet 1     tiotropium (SPIRIVA) 18 mcg inhalation capsule Place 1 capsule (2 puffs total) into inhaler and inhale daily. 30 capsule 11     witch hazeL (TUCKS, WITCH HAZEL,) 50 % PadM Apply  to the rectal area as needed for pain. 1 each 0     No current facility-administered medications on file prior to visit.      /80   Pulse 86   Resp 12   Ht 5' 3\" (1.6 m)   Wt 129 lb (58.5 kg)   SpO2 96%   Breastfeeding No   BMI 22.85 kg/m      Medical History  Active Ambulatory (Non-Hospital) Problems    Diagnosis     Dizziness     Acute asthma exacerbation     Microcytic anemia     Latent tuberculosis     SOB (shortness of breath)     Blood in stool     Lower GI bleeding     Chest pain     Rectal bleed     Hyperlipidemia     Cataract     Moderate persistent asthma     Migraine headache     S/P coronary artery stent placement     Type 2 diabetes mellitus treated without insulin (H)     CAD (coronary artery disease)     SVT (supraventricular tachycardia) (H)     Chest wall pain     GERD (gastroesophageal reflux disease)     HTN (hypertension)     Asthma exacerbation     Past Medical History:   Diagnosis Date     Acute blood loss anemia      Anxiety      Arthritis      Asthma      Chest wall pain 12/26/2017     COPD (chronic obstructive pulmonary disease) (H)      Depression      Diabetes mellitus (H)      Diabetes mellitus, type II (H)      Generalized headaches      GERD (gastroesophageal reflux disease)      High cholesterol      History of anesthesia complications      HTN (hypertension)      Lactic acid acidosis 11/23/2018     Pneumonia      SVT (supraventricular tachycardia) " (H)      Unstable angina (H) 10/5/2018        Surgical History  She  has a past surgical history that includes Coronary Angiogram (N/A, 1/25/2018); pr colonoscopy flx dx w/collj spec when pfrmd (N/A, 12/10/2018); pr esophagogastroduodenoscopy transoral diagnostic (N/A, 12/10/2018); and Coronary stent placement.       Social History  Reviewed, she lives at home with her family.   Allergies  No Known Allergies                             Data Review - imaging, labs, and ekgs listed below were reviewed by me.  Chest XRay and chest CT images and EKG tracings interpreted personally.     Past Labs  Admission on 02/29/2020, Discharged on 02/29/2020   Component Date Value     WBC 02/29/2020 8.6      RBC 02/29/2020 4.69      Hemoglobin 02/29/2020 11.3*     Hematocrit 02/29/2020 37.6      MCV 02/29/2020 80      MCH 02/29/2020 24.1*     MCHC 02/29/2020 30.1*     RDW 02/29/2020 19.2*     Platelets 02/29/2020 227      MPV 02/29/2020 11.2      Neutrophils % 02/29/2020 81*     Lymphocytes % 02/29/2020 15*     Monocytes % 02/29/2020 3      Eosinophils % 02/29/2020 1      Basophils % 02/29/2020 0      Neutrophils Absolute 02/29/2020 6.9      Lymphocytes Absolute 02/29/2020 1.3      Monocytes Absolute 02/29/2020 0.2      Eosinophils Absolute 02/29/2020 0.1      Basophils Absolute 02/29/2020 0.0    Office Visit on 01/16/2020   Component Date Value     Microalbumin, Random Uri* 01/16/2020 <0.50      Creatinine, Urine 01/16/2020 28.4      Microalbumin/Creatinine * 01/16/2020     Admission on 01/06/2020, Discharged on 01/09/2020   Component Date Value     Troponin I 01/06/2020 <0.01      BNP 01/06/2020 41      INR 01/06/2020 0.96      PTT 01/06/2020 28      Sodium 01/06/2020 143      Potassium 01/06/2020 3.6      Chloride 01/06/2020 108*     CO2 01/06/2020 26      Anion Gap, Calculation 01/06/2020 9      Glucose 01/06/2020 92      Calcium 01/06/2020 9.4      BUN 01/06/2020 10      Creatinine 01/06/2020 0.67      GFR MDRD Af Amer  01/06/2020 >60      GFR MDRD Non Af Amer 01/06/2020 >60      VENTRICULAR RATE 01/06/2020 76      ATRIAL RATE 01/06/2020 76      P-R INTERVAL 01/06/2020 146      QRS DURATION 01/06/2020 94      Q-T INTERVAL 01/06/2020 398      QTC CALCULATION (BEZET) 01/06/2020 447      P [place] 01/06/2020 3      R AXIS 01/06/2020 -1      T AXIS 01/06/2020 33      MUSE DIAGNOSIS 01/06/2020                      Value:Normal sinus rhythm  Normal ECG  When compared with ECG of 21-DEC-2019 21:30,  No significant change was found  Confirmed by SEE ED PROVIDER NOTE FOR, ECG INTERPRETATION ([id number]),  Roberth Lepe ([id number]) on 1/14/2020 6:58:29 PM       Influenza  A, Rapid Anti* 01/06/2020 No Influenza A antigen detected      Influenza B, Rapid Antig* 01/06/2020 No Influenza B antigen detected      D-Dimer, Quant 01/06/2020 <0.27      WBC 01/06/2020 8.8      RBC 01/06/2020 4.47      Hemoglobin 01/06/2020 9.9*     Hematocrit 01/06/2020 33.6*     MCV 01/06/2020 75*     MCH 01/06/2020 22.1*     MCHC 01/06/2020 29.5*     RDW 01/06/2020 17.4*     Platelets 01/06/2020 218      MPV 01/06/2020 12.8*     Neutrophils % 01/06/2020 64      Lymphocytes % 01/06/2020 25      Monocytes % 01/06/2020 6      Eosinophils % 01/06/2020 4      Basophils % 01/06/2020 1      Neutrophils Absolute 01/06/2020 5.6      Lymphocytes Absolute 01/06/2020 2.2      Monocytes Absolute 01/06/2020 0.5      Eosinophils Absolute 01/06/2020 0.4      Basophils Absolute 01/06/2020 0.0      Glucose 01/06/2020 145*     Lactic Acid 01/06/2020 7.6*     pH, Arterial 01/06/2020 7.39      pCO2, Arterial 01/06/2020 29*     pO2, Arterial 01/06/2020 82      Bicarbonate, Arterial Ca* 01/06/2020 19.9*     O2 Sat, Arterial 01/06/2020 97.3*     Oxyhemoglobin 01/06/2020 95.8*     Base Excess, Arterial Ca* 01/06/2020 -6.5      Ventilation Mode 01/06/2020 Room Air      Sample Stabilized Temper* 01/06/2020 37.0      Lactic Acid 01/06/2020 5.2*     Troponin I 01/06/2020 0.01      Troponin I  01/07/2020 <0.01      Hemoglobin A1c 01/07/2020 6.3*     Sodium 01/07/2020 142      Potassium 01/07/2020 3.7      Chloride 01/07/2020 113*     CO2 01/07/2020 19*     Anion Gap, Calculation 01/07/2020 10      Glucose 01/07/2020 256*     Calcium 01/07/2020 8.5      BUN 01/07/2020 12      Creatinine 01/07/2020 0.73      GFR MDRD Af Amer 01/07/2020 >60      GFR MDRD Non Af Amer 01/07/2020 >60      Lactic Acid 01/07/2020 2.9*     Glucose 01/07/2020 137      WBC 01/07/2020 10.2      RBC 01/07/2020 3.75*     Hemoglobin 01/07/2020 8.4*     Hematocrit 01/07/2020 28.4*     MCV 01/07/2020 76*     MCH 01/07/2020 22.4*     MCHC 01/07/2020 29.6*     RDW 01/07/2020 17.1*     Platelets 01/07/2020 188      MPV 01/07/2020 12.5      Neutrophils % 01/07/2020 89*     Lymphocytes % 01/07/2020 8*     Monocytes % 01/07/2020 3      Eosinophils % 01/07/2020 0      Basophils % 01/07/2020 0      Neutrophils Absolute 01/07/2020 9.1*     Lymphocytes Absolute 01/07/2020 0.8      Monocytes Absolute 01/07/2020 0.3      Eosinophils Absolute 01/07/2020 0.0      Basophils Absolute 01/07/2020 0.0      Glucose 01/07/2020 149*     Glucose 01/07/2020 126      Magnesium 01/07/2020 1.9      Lactic Acid 01/07/2020 1.8      Glucose 01/07/2020 204*     Platelets 01/08/2020 198      Glucose 01/08/2020 169*     Glucose 01/08/2020 306*     Sodium 01/09/2020 142      Potassium 01/09/2020 3.5      Chloride 01/09/2020 108*     CO2 01/09/2020 23      Anion Gap, Calculation 01/09/2020 11      Glucose 01/09/2020 113      Calcium 01/09/2020 9.1      BUN 01/09/2020 18      Creatinine 01/09/2020 0.68      GFR MDRD Af Amer 01/09/2020 >60      GFR MDRD Non Af Amer 01/09/2020 >60      WBC 01/08/2020 8.2      RBC 01/08/2020 3.82      Hemoglobin 01/08/2020 8.4*     Hematocrit 01/08/2020 29.2*     MCV 01/08/2020 76*     MCH 01/08/2020 22.0*     MCHC 01/08/2020 28.8*     RDW 01/08/2020 17.6*     Platelets 01/08/2020 198      MPV 01/08/2020 13.2*     Neutrophils % 01/08/2020 85*      Lymphocytes % 01/08/2020 10*     Monocytes % 01/08/2020 4      Eosinophils % 01/08/2020 0      Basophils % 01/08/2020 0      Neutrophils Absolute 01/08/2020 7.0      Lymphocytes Absolute 01/08/2020 0.8      Monocytes Absolute 01/08/2020 0.3      Eosinophils Absolute 01/08/2020 0.0      Basophils Absolute 01/08/2020 0.0      Glucose 01/08/2020 258*     Glucose 01/08/2020 226*     VENTRICULAR RATE 01/09/2020 92      ATRIAL RATE 01/09/2020 92      P-R INTERVAL 01/09/2020 144      QRS DURATION 01/09/2020 88      Q-T INTERVAL 01/09/2020 378      QTC CALCULATION (BEZET) 01/09/2020 467      P Axis 01/09/2020 36      R AXIS 01/09/2020 1      T AXIS 01/09/2020 27      MUSE DIAGNOSIS 01/09/2020                      Value:Normal sinus rhythm  Normal ECG  When compared with ECG of 06-JAN-2020 11:35,  No significant change was found  Confirmed by MIGUEL CALDERON MD LOC: ([id number]) on 1/9/2020 2:09:15 PM       Glucose 01/09/2020 115      WBC 01/09/2020 11.6*     RBC 01/09/2020 4.09      Hemoglobin 01/09/2020 9.1*     Hematocrit 01/09/2020 30.7*     MCV 01/09/2020 75*     MCH 01/09/2020 22.2*     MCHC 01/09/2020 29.6*     RDW 01/09/2020 17.4*     Platelets 01/09/2020 219      MPV 01/09/2020 12.7*     Neutrophils % 01/09/2020 70      Lymphocytes % 01/09/2020 24      Monocytes % 01/09/2020 5      Eosinophils % 01/09/2020 0      Basophils % 01/09/2020 0      Neutrophils Absolute 01/09/2020 8.1*     Lymphocytes Absolute 01/09/2020 2.7      Monocytes Absolute 01/09/2020 0.6      Eosinophils Absolute 01/09/2020 0.0      Basophils Absolute 01/09/2020 0.0      CT chest images reviewed and interpreted by me: No pulmonary emboli.  Bring him is essentially clear.    Chest x-ray images reviewed and interpreted by me: Lungs essentially clear.  No results found.  Cardiac catheterization report reviewed by me: LAD lesion status post stent.

## 2021-06-06 NOTE — TELEPHONE ENCOUNTER
Refill Approved    Rx renewed per Medication Renewal Policy. Medication was last renewed on 1/9/20.    Kellie Lott, [facility] Triage/Med Refill 3/14/2020     Requested Prescriptions   Pending Prescriptions Disp Refills     ferrous sulfate 325 (65 FE) MG tablet 30 tablet 1     Sig: Take 1 tablet (325 mg total) by mouth daily with breakfast.       Iron Supplements Refill Protocol Passed - 3/11/2020  7:55 PM        Passed - PCP or prescribing provider visit in past 12 months       Last office visit with prescriber/PCP: 1/16/2020 Adrien Cardoza MD OR same dept: 1/16/2020 Adrien Cardoza MD OR same specialty: 1/16/2020 Adrien Cardoza MD  Last physical: 9/7/2018 Last MTM visit: 5/21/2019 Malu Muñoz, PharmD   Next visit within 3 mo: Visit date not found  Next physical within 3 mo: Visit date not found  Prescriber OR PCP: Adrien Cardoza MD  Last diagnosis associated with med order: 1. Microcytic anemia  - ferrous sulfate 325 (65 FE) MG tablet; Take 1 tablet (325 mg total) by mouth daily with breakfast.  Dispense: 30 tablet; Refill: 1    If protocol passes may refill for 12 months if within 3 months of last provider visit (or a total of 15 months).             Passed - Hemoglobin or Hematocrit in last 12 months     Hemoglobin   Date Value Ref Range Status   02/29/2020 11.3 (L) 12.0 - 16.0 g/dL Final     Hematocrit   Date Value Ref Range Status   02/29/2020 37.6 35.0 - 47.0 % Final

## 2021-06-06 NOTE — TELEPHONE ENCOUNTER
Refill Approved    Rx renewed per Medication Renewal Policy. Medication was last renewed on 1/9/20.    Kellie Lott, [facility] Triage/Med Refill 3/3/2020     Requested Prescriptions   Pending Prescriptions Disp Refills     metFORMIN (GLUCOPHAGE) 500 MG tablet [Pharmacy Med Name: METFORMIN  MG TABS 500 TAB] 60 tablet 2     Sig: TAKE 1 TABLET (500 MG TOTAL) BY MOUTH 2 TIMES A DAY WITH MEALS FOR DIABETES       Metformin Refill Protocol Passed - 3/2/2020 11:10 AM        Passed - Blood pressure in last 12 months     BP Readings from Last 1 Encounters:   02/29/20 103/70             Passed - LFT or AST or ALT in last 12 months     Albumin   Date Value Ref Range Status   12/21/2019 3.9 3.5 - 5.0 g/dL Final     Bilirubin, Total   Date Value Ref Range Status   12/21/2019 0.5 0.0 - 1.0 mg/dL Final     Bilirubin, Direct   Date Value Ref Range Status   11/16/2019 0.2 <=0.5 mg/dL Final     Alkaline Phosphatase   Date Value Ref Range Status   12/21/2019 96 45 - 120 U/L Final     AST   Date Value Ref Range Status   12/21/2019 16 0 - 40 U/L Final     ALT   Date Value Ref Range Status   12/21/2019 12 0 - 45 U/L Final     Protein, Total   Date Value Ref Range Status   12/21/2019 7.4 6.0 - 8.0 g/dL Final                Passed - GFR or Serum Creatinine in last 6 months     GFR MDRD Non Af Amer   Date Value Ref Range Status   01/09/2020 >60 >60 mL/min/1.73m2 Final     GFR MDRD Af Amer   Date Value Ref Range Status   01/09/2020 >60 >60 mL/min/1.73m2 Final             Passed - Visit with PCP or prescribing provider visit in last 6 months or next 3 months     Last office visit with prescriber/PCP: 1/16/2020 OR same dept: 1/16/2020 Adrien Cardoza MD OR same specialty: 1/16/2020 Adrien Cardoza MD Last physical: Visit date not found Last MTM visit: 5/21/2019 Malu Muñoz, PharmD         Next appt within 3 mo: Visit date not found  Next physical within 3 mo: Visit date not found  Prescriber OR PCP: Adrien Cardoza MD  Last diagnosis  associated with med order: 1. Type 2 diabetes mellitus treated without insulin (H)  - metFORMIN (GLUCOPHAGE) 500 MG tablet [Pharmacy Med Name: METFORMIN  MG TABS 500 TAB]; TAKE 1 TABLET (500 MG TOTAL) BY MOUTH 2 TIMES A DAY WITH MEALS FOR DIABETES  Dispense: 60 tablet; Refill: 2     If protocol passes may refill for 12 months if within 3 months of last provider visit (or a total of 15 months).           Passed - A1C in last 6 months     Hemoglobin A1c   Date Value Ref Range Status   01/07/2020 6.3 (H) 4.2 - 6.1 % Final               Passed - Microalbumin in last year      Microalbumin, Random Urine   Date Value Ref Range Status   01/16/2020 <0.50 0.00 - 1.99 mg/dL Final

## 2021-06-06 NOTE — TELEPHONE ENCOUNTER
If no opening here she sould go to walk in clinic.   I do not think she travel recently.    Dr. Adrien Cardoza  3/12/2020 11:08 AM

## 2021-06-06 NOTE — TELEPHONE ENCOUNTER
Pt's KAREN Cervantes calling with  [facility] #[id number]. States she was called earlier today about scheduling an appointment regarding mammography and was told to speak to a triage nurse. Mild cough for 3-4 days, no fever, wheezing.     Call disconnected prior to completing triage. Attempted call back and unable to leave a message.     Routing to clinic for follow up with pt regarding scheduling for office visit.     Reason for Disposition    Taking an ACE Inhibitor medication  (e.g., benazepril/LOTENSIN, captopril/CAPOTEN, enalapril/VASOTEC, lisinopril/ZESTRIL)    Protocols used: COUGH - ACUTE NON-PRODUCTIVE-A-AH

## 2021-06-06 NOTE — PATIENT INSTRUCTIONS - HE
1. Please discontinue your Spiriva inhaler and use Incruse inhaler once a day instead.  2. Please start using Prilosec 40mg daily.

## 2021-06-07 ENCOUNTER — COMMUNICATION - HEALTHEAST (OUTPATIENT)
Dept: FAMILY MEDICINE | Facility: CLINIC | Age: 51
End: 2021-06-07

## 2021-06-07 NOTE — TELEPHONE ENCOUNTER
Orders being requested: Order for a shower chair  Reason service is needed/diagnosis: for shower safety  When are orders needed by: as soon as possible  Where to send Orders: Typically the coordinator uses [employer], but said anywhere would be fine.  Okay to leave detailed message?  Yes

## 2021-06-07 NOTE — PROGRESS NOTES
"Kulwant Amin is a 50 y.o. female who is being evaluated via a billable telephone visit.      The patient has been notified of following:     \"This telephone visit will be conducted via a call between you and your physician/provider. We have found that certain health care needs can be provided without the need for a physical exam.  This service lets us provide the care you need with a short phone conversation.  If a prescription is necessary we can send it directly to your pharmacy.  If lab work is needed we can place an order for that and you can then stop by our lab to have the test done at a later time.    Telephone visits are billed at different rates depending on your insurance coverage. During this emergency period, for some insurers they may be billed the same as an in-person visit.  Please reach out to your insurance provider with any questions.    If during the course of the call the physician/provider feels a telephone visit is not appropriate, you will not be charged for this service.\"    Patient has given verbal consent to a Telephone visit? Yes    Patient would like to receive their AVS by AVS Preference: Mail a copy.    Additional provider notes: Patient is a 49 yo [place] female who is presenting for a virtual visit with the assistance of a Faroese language line .     This is a follow up of yesterday's phone visit for her high blood pressure and headache.    1) HTN: As an update, patient did take 50 mg of metoprolol two times a day yesterday and this morning.  Her blood pressure today was 126/91.  Pulse was 97.  She is not lightheaded. No chest pain, no shortness of breath or palpitations.  She feels this is better.      2) Headache:  Initially thought to be due to the high blood pressure.  She notes about a 20% improvement in her headache with the improved blood pressure, but not completely.  It still feels like a pressure across her whole head. Today she woke with a headache, but it can " occur at any time of day.  No vision changes.  No weakness or slurred speech.    I discussed that she is already taking amitriptyline for migraines nightly.  Discussed the option of increasing her medication to 20 mg at bedtime.  Discussed how it works as a preventative.  She would like to try that.  She is taking Tylenol for headache pain.          Assessment/Plan:  Kulwant was seen today for follow-up.    Diagnoses and all orders for this visit:    Other migraine without status migrainosus, not intractable  -     amitriptyline (ELAVIL) 10 MG tablet; Take 2 tablets (20 mg total) by mouth at bedtime.  Headache only improved a little with better control of her blood pressure.  She has a history of migraine.  Will increase her amitriptyline to 2 tablets.  Recommend close follow up next week with her primary or partners as a phone appointment.  Discussed potential side effects of the medication including drowsiness and dry mouth.  Discussed if symptoms worsen in anyway, would recommend visit to ER as she may need imaging.  Patient agrees with this plan.    Essential hypertension  -     metoprolol tartrate (LOPRESSOR) 25 MG tablet; Take 2 tablets (50 mg total) by mouth 2 (two) times a day.  Patient is tolerating the higher 50 mg dose without exacerbation of her chronic lung disease.  Blood pressure is improved within 24 hours of medication change.  There was some improvement in her headache symptoms, but not complete.  Recommend continuing on this routine along with her other medications and can follow up with primary in 1 week.          Phone call duration:  21 minutes    Uet Erwin CMA

## 2021-06-07 NOTE — PROGRESS NOTES
"Kulwant Amin is a 50 y.o. female who is being evaluated via a billable telephone visit.      The patient has been notified of following:     \"This telephone visit will be conducted via a call between you and your physician/provider. We have found that certain health care needs can be provided without the need for a physical exam.  This service lets us provide the care you need with a short phone conversation.  If a prescription is necessary we can send it directly to your pharmacy.  If lab work is needed we can place an order for that and you can then stop by our lab to have the test done at a later time.    If during the course of the call the physician/provider feels a telephone visit is not appropriate, you will not be charged for this service.\"     Patient has given verbal consent to a Telephone visit? Yes    Kulwant Amin complains of    Chief Complaint   Patient presents with     Follow-up     Cough, headache and bilateral leg pain         I have reviewed and updated the patient's Past Medical History, Social History, Family History and Medication List.    ALLERGIES  Patient has no known allergies.    Additional provider notes:  Kulwant had telephone visit on 3/26/2020 due to cough.  Today's appointment is for follow-up.  She was given oral steroid for 5 days.  She states her wheezing and breathing are improving but still have mild cough.  She denied fever or chills.  She denied sore throat.  No known exposure to COVID 19 she has history of asthma, diabetes, CAD and others.    During telephone visit, she was able to complete sentences without difficulty.  Voice was clear, did not appear to be in distress.    Assessment/Plan:    1. Cough  Completed oral steroid 3 days ago.  Sent in cough syrup.  Discussed indications to call.   - guaiFENesin (ROBITUSSIN) 100 mg/5 mL syrup; Take 10 mL (200 mg total) by mouth 3 (three) times a day as needed for cough.  Dispense: 200 mL; Refill: 0    2. Asthma with acute " exacerbation, unspecified asthma severity, unspecified whether persistent  Continue current inhalers.    3. Type 2 diabetes mellitus treated without insulin (H)  No med changes today.      Phone call duration:  7 minutes    Dr. Adrien Cardoza  4/7/2020 1:34 PM

## 2021-06-07 NOTE — PROGRESS NOTES
"Kulwant Amin is a 50 y.o. female who is being evaluated via a billable telephone visit.      The patient has been notified of following:     \"This telephone visit will be conducted via a call between you and your physician/provider. We have found that certain health care needs can be provided without the need for a physical exam.  This service lets us provide the care you need with a short phone conversation.  If a prescription is necessary we can send it directly to your pharmacy.  If lab work is needed we can place an order for that and you can then stop by our lab to have the test done at a later time.    Telephone visits are billed at different rates depending on your insurance coverage. During this emergency period, for some insurers they may be billed the same as an in-person visit.  Please reach out to your insurance provider with any questions.    If during the course of the call the physician/provider feels a telephone visit is not appropriate, you will not be charged for this service.\"    Patient has given verbal consent to a Telephone visit? Yes YES    Patient would like to receive their AVS by AVS Preference: Mail a copy. MY CHART    Additional provider notes:   Assessment/Plan:    50 y.o.-year-old woman with history of organic fuel exposure in the home; visit was conducted through a [place] .  Her PFTs have been previously noted not to be diagnostic due to poor effort was recently seen in the emergency room likely asthma exacerbation; iron deficient with significant anemia and is not entirely clear how significantly her anemia is contributing to her shortness of breath.  She continues to be compliant with her inhalers but endorses a cough which coincided with completion of her steroid therapy.  Notably, her transthoracic echocardiogram and BNP as recently as December were noted to be unremarkable.  For the present we would recommend;    Continue Breo Ellipta 1 puff daily.  She knows to " gargle after using this.    Unable to switch over to Incruse and continues to use Spiriva daily.    I have refilled her prescription for an albuterol inhaler per the daughter-in-law's request.    We will give her a 2-week course of prednisone 5 mg a day to see if this alleviates her symptoms.  The patient has previously demonstrated good compliance with her medication therapy and may just be someone who requires low-dose steroids for long-term management.    I have arranged for a 4-week follow-up.    Tamra Duong  Pulmonary and Critical Care  [id number]      Subjective:    Patient ID: Kulwant Amin is a 50 y.o. female.    Ms. Amin is a 49 y.o.female with a past medical history significant for anxiety, arthritis, questionable asthma versus COPD, diabetes, hypertension and a prior history of SVT presents with a follow-up visit for her pulmonary complaints.  She was seen by me in clinic recently and continues to complain of a cough with shortness of breath.  She had previously demonstrated good inhaler technique and is still compliant with her Breo and Spiriva inhalers but still feels the need to use her albuterol for rescue 3-4 times a day.  She has nighttime symptoms almost 3-4 times a week when she wakes up coughing or short of breath.  Of note when she does use the albuterol she does not feel any relief of her symptoms.  She states that she generally eats dinner at about 6 in the evening and goes to bed at about 9 PM but that will she will often have a snack around 8:00 at night.  She alludes to using 2-3 pillows under her head at night but states that this is not because her breathing is improved with this but more because it is comfortable for her.  She denies chest pain or chest tightness and does note that the previous time that she had been given a low-dose prednisone her wheezing, coughing and shortness of breath had improved.  In the past 4 weeks, how much of the time did your asthma keep you from getting as  much done at work, school, or at home?: Some of the time  During the past 4 weeks, how often have you had shortness of breath?: 3 to 6 times a week  During the past 4 weeks, how often did your asthma symptoms (wheezing, coughing, shortness of breath, chest tightness or pain) wake you up at night or earlier in the morning?: 4 or more nights a week  During the past 4 weeks, how often have you used your rescue inhaler or nebulizer medication (such as albuterol)?: 1 or 2 times per day  How would you rate your asthma control during the past 4 weeks?: Somewhat controlled  ACT Total Score: (!) 12  In the past 12 months, have you visited the emergency room due to your asthma?: Yes  Number of Emergency Room visits in the past 12 months due to asthma: 6  In the past 12 months, have you been hospitalized due to your asthma?: No    Cough   Associated symptoms include coughing.     Asthma   She complains of cough. Her past medical history is significant for asthma.     Pertinent past medical history:  48-year-old female here for follow-up.  Her breathing is a bit worse than 6 months ago.  She had multiple ER visits and evaluations.  This includes negative PE study.  She had a cath with a 75% LAD lesion which was intervened upon.  Since her catheterization she feels she has more heartburn.  Her breathing is worse with exertion.  Improves with rest.  It is also worse with cold weather.  Warm weather and humid air does not bother.  Symptoms localized to the chest.  Pertinent positives include burning sensation in the chest.  Pertinent negatives include no fever or hemoptysis.          Current Outpatient Medications on File Prior to Visit   Medication Sig Dispense Refill     acetaminophen (TYLENOL) 500 MG tablet Take 2 tablets (1,000 mg total) by mouth every 8 (eight) hours as needed for pain. 90 tablet 10     albuterol (PROVENTIL) 2.5 mg /3 mL (0.083 %) nebulizer solution Take 3 mL (2.5 mg total) by nebulization every 6 (six) hours  as needed for wheezing. 150 mL 12     albuterol (VENTOLIN HFA) 90 mcg/actuation inhaler Inhale 2 puffs every 6 (six) hours as needed for wheezing. 1 Inhaler 3     aluminum-magnesium hydroxide-simethicone (MAALOX ADVANCED) 200-200-20 mg/5 mL Susp Take 30 mL by mouth every 6 (six) hours as needed (abdominal pain or bloating). 900 mL 2     amitriptyline (ELAVIL) 10 MG tablet Take 2 tablets (20 mg total) by mouth at bedtime. 90 tablet 0     aspirin 81 MG EC tablet Take 1 tablet (81 mg total) by mouth daily. 30 tablet 0     atorvastatin (LIPITOR) 80 MG tablet Take 1 tablet (80 mg total) by mouth at bedtime. 90 tablet 3     benzonatate (TESSALON) 100 MG capsule Take 1 capsule (100 mg total) by mouth 3 (three) times a day as needed for cough. 90 capsule 0     blood glucose meter (GLUCOMETER) Use 1 each As Directed 3 (three) times a day. Dispense glucometer brand per patient's insurance at pharmacy discretion.(E11.9) 1 each 0     blood glucose test strips Use 1 each As Directed 3 (three) times a day. Dispense brand per patient's insurance at pharmacy discretion.(E11.9) 300 strip 3     clopidogrel (PLAVIX) 75 mg tablet Take 1 tablet (75 mg total) by mouth daily. 30 tablet 0     cyanocobalamin 1000 MCG tablet Take 1 tablet (1,000 mcg total) by mouth daily. 30 tablet 0     ferrous sulfate 325 (65 FE) MG tablet Take 1 tablet (325 mg total) by mouth daily with breakfast. 90 tablet 3     fluticasone furoate-vilanterol (BREO ELLIPTA) 200-25 mcg/dose DsDv inhaler Inhale 1 puff daily. 1 each 12     generic lancets Use 1 each As Directed 3 (three) times a day. Dispense brand per patient's insurance at pharmacy discretion.(E11.9) 300 each 3     guaiFENesin (ROBITUSSIN) 100 mg/5 mL syrup Take 10 mL (200 mg total) by mouth 3 (three) times a day as needed for cough. 200 mL 0     hydroCHLOROthiazide (MICROZIDE) 12.5 mg capsule Take 1 capsule (12.5 mg total) by mouth daily. 30 capsule 0     INJECT EASE LANCETS 30 gauge Misc USE 1 EACH AS  DIRECTED THREE TIMES A DAY *34 DAYS* 100 each 11     insulin aspart U-100 (NOVOLOG) 100 unit/mL injection Inject under the skin 3 (three) times a day with meals. Use per sliding scale < 150 - No insulin, 151-200 use 3 U, 201- 250 use 5 U,   251- 300 use 8 U        metFORMIN (GLUCOPHAGE) 500 MG tablet TAKE 1 TABLET (500 MG TOTAL) BY MOUTH 2 TIMES A DAY WITH MEALS FOR DIABETES 180 tablet 2     metoprolol tartrate (LOPRESSOR) 25 MG tablet Take 2 tablets (50 mg total) by mouth 2 (two) times a day. 30 tablet 0     montelukast (SINGULAIR) 10 mg tablet TAKE 1 PILL (10 MG TOTAL) BY MOUTH AT BEDTIME FOR ASTHMA 30 tablet 10     omeprazole (PRILOSEC) 20 MG capsule Take 2 capsules (40 mg total) by mouth daily before breakfast. 30 capsule 12     ondansetron (ZOFRAN) 4 MG tablet Take 1 tablet (4 mg total) by mouth every 8 (eight) hours as needed for nausea. 12 tablet 0     polyethylene glycol (GLYCOLAX) 17 gram/dose powder MIX 17 GRAMS WITH LIQUID AND DRINK ONCE DAILY. Hold for loose stools. 510 g 12     predniSONE (DELTASONE) 5 MG tablet TAKE 1 PILL (5 MG) BY MOUTH EVERYDAY 35 tablet 0     sucralfate (CARAFATE) 1 gram tablet Take 1 tablet (1 g total) by mouth 4 (four) times a day before meals and at bedtime. Take 1 hour prior to meals 120 tablet 1     tiotropium (SPIRIVA) 18 mcg inhalation capsule Place 1 capsule (2 puffs total) into inhaler and inhale daily. 30 capsule 11     witch hazeL (TUCKS, WITCH HAZEL,) 50 % PadM Apply  to the rectal area as needed for pain. 1 each 0     ipratropium (ATROVENT) 0.02 % nebulizer solution Take 2.5 mL (0.5 mg total) by nebulization 3 (three) times a day. 300 mL 0     No current facility-administered medications on file prior to visit.      There were no vitals taken for this visit.    Medical History  Active Ambulatory (Non-Hospital) Problems    Diagnosis     Weakness generalized     Dizziness     Acute asthma exacerbation     Microcytic anemia     Latent tuberculosis     SOB (shortness of  breath)     Blood in stool     Lower GI bleeding     Chest pain     Rectal bleed     Hyperlipidemia     Cataract     Moderate persistent asthma     Migraine headache     S/P coronary artery stent placement     Type 2 diabetes mellitus treated without insulin (H)     CAD (coronary artery disease)     SVT (supraventricular tachycardia) (H)     Chest wall pain     GERD (gastroesophageal reflux disease)     HTN (hypertension)     Asthma exacerbation     Past Medical History:   Diagnosis Date     Acute blood loss anemia      Anxiety      Arthritis      Asthma      Chest wall pain 12/26/2017     COPD (chronic obstructive pulmonary disease) (H)      Depression      Diabetes mellitus (H)      Diabetes mellitus, type II (H)      Generalized headaches      GERD (gastroesophageal reflux disease)      High cholesterol      History of anesthesia complications      HTN (hypertension)      Lactic acid acidosis 11/23/2018     Pneumonia      SVT (supraventricular tachycardia) (H)      Unstable angina (H) 10/5/2018        Surgical History  She  has a past surgical history that includes Coronary Angiogram (N/A, 1/25/2018); pr colonoscopy flx dx w/collj spec when pfrmd (N/A, 12/10/2018); pr esophagogastroduodenoscopy transoral diagnostic (N/A, 12/10/2018); and Coronary stent placement.       Social History  Reviewed, she lives at home with her family.   Allergies  No Known Allergies                             Data Review - imaging, labs, and ekgs listed below were reviewed by me.  Chest XRay and chest CT images and EKG tracings interpreted personally.     Past Labs  No visits with results within 2 Month(s) from this visit.   Latest known visit with results is:   Admission on 02/29/2020, Discharged on 02/29/2020   Component Date Value     WBC 02/29/2020 8.6      RBC 02/29/2020 4.69      Hemoglobin 02/29/2020 11.3*     Hematocrit 02/29/2020 37.6      MCV 02/29/2020 80      MCH 02/29/2020 24.1*     MCHC 02/29/2020 30.1*     RDW  02/29/2020 19.2*     Platelets 02/29/2020 227      MPV 02/29/2020 11.2      Neutrophils % 02/29/2020 81*     Lymphocytes % 02/29/2020 15*     Monocytes % 02/29/2020 3      Eosinophils % 02/29/2020 1      Basophils % 02/29/2020 0      Neutrophils Absolute 02/29/2020 6.9      Lymphocytes Absolute 02/29/2020 1.3      Monocytes Absolute 02/29/2020 0.2      Eosinophils Absolute 02/29/2020 0.1      Basophils Absolute 02/29/2020 0.0      CT chest images reviewed and interpreted by me: No pulmonary emboli.  Bring him is essentially clear.    Chest x-ray images reviewed and interpreted by me: Lungs essentially clear.  No results found.  Cardiac catheterization report reviewed by me: LAD lesion status post stent.    Phone call duration: 15 minutes    Tamra Duong  Pulmonary and Critical Care  [id number]

## 2021-06-07 NOTE — TELEPHONE ENCOUNTER
RN triage   Call from pt son -- signed consent in chart -- son is with pt   Pt BP today 158/113 -- later this AM == 114/81 --   Pt usually takes her BP meds every day-- has not taken meds today   No chest pain   No diff breathing   No vision change - no gait change  Pt has headache - daily- for 15 days -- lasts 2-3 hrs -- now rate headache 4-5/10 -- has tingling in both legs-- for the past 2 years -- no change   Pt also has cough x 1 week --- not getting worse   No fever - no wheeze -- no diff breathing   No knows exposure to Covid 19  Reviewed home care advice   Transferred to  for telephone visit   Rakel Armstrong RN [facility] RN triage          Reason for Disposition    Systolic BP >= 140 OR Diastolic >= 90, and is taking BP medications    Unexplained headache that is present > 24 hours    Protocols used: HIGH BLOOD PRESSURE-A-OH, HEADACHE-A-OH

## 2021-06-07 NOTE — TELEPHONE ENCOUNTER
Marvin placed. Printed to station 4. ( working from home until end of April )  Pleas send it in.    Dr. Adrien Cardoza  4/7/2020 1:42 PM

## 2021-06-07 NOTE — PROGRESS NOTES
"Kulwant Amin is a 50 y.o. female who is being evaluated via a billable telephone visit.      The patient has been notified of following:     \"This telephone visit will be conducted via a call between you and your physician/provider. We have found that certain health care needs can be provided without the need for a physical exam.  This service lets us provide the care you need with a short phone conversation.  If a prescription is necessary we can send it directly to your pharmacy.  If lab work is needed we can place an order for that and you can then stop by our lab to have the test done at a later time.    Telephone visits are billed at different rates depending on your insurance coverage. During this emergency period, for some insurers they may be billed the same as an in-person visit.  Please reach out to your insurance provider with any questions.    If during the course of the call the physician/provider feels a telephone visit is not appropriate, you will not be charged for this service.\"    Patient has given verbal consent to a Telephone visit? Yes    Patient would like to receive their AVS by AVS Preference: Mail a copy.    Additional provider notes: Patient is seen with the assistance of a [facility] .    Patient has had a headache for the for past week.  The headache affects the whole head.  She describes it as a pounding.  Occurs at various times through the day.  Since this morning she has a headache now.  Sometimes lasts 2-3 hours.  Its been daily this past week.  No sensitivity to light.  No vision changes.  Had some nausea a few days ago.  Not today.  Tylenol is not helping.  Sleep is not helping.    High blood pressure for the past week.  Daughter in law notes that her BP today is 163/113. Taking Metoprolol 25 mg two times a day.  Taking HCTZ qam 12.5 mg.  Pulse is 98.  No chest pain.  Sometimes feeling light headed.      Assessment/Plan:  Kulwant was seen today for headache and " hypertension.    Diagnoses and all orders for this visit:    Essential hypertension  Not well controlled.  Unclear why as patient has had no changes to medications or lifestyle.  ? If the rise is due to headache pain or if this is causing the tension headache.  For now, will increase metoprolol to 2 tablet two times a day.  Will check back in tomorrow to see if this improves her blood pressure and her pain.  Patient notes that her child has an appointment tomorrow at [place].  Asked that she get her BP checked and her pulse checked while there.  Will schedule phone follow up for after that appt time.    Other migraine without status migrainosus, not intractable  Sounds like a tension headache.  Will attempt to treat BP primarily to see if that improves her symptoms.  If not, will need to consider treatment for this.  Follow up tomorros.      Increase to two metoprolol two times a day.    Phone call duration:  23  minutes    Ute Erwin CMA

## 2021-06-07 NOTE — PROGRESS NOTES
"Kulwant Amin is a 50 y.o. female who is being evaluated via a billable telephone visit.      The patient has been notified of following:     \"This telephone visit will be conducted via a call between you and your physician/provider. We have found that certain health care needs can be provided without the need for a physical exam.  This service lets us provide the care you need with a short phone conversation.  If a prescription is necessary we can send it directly to your pharmacy.  If lab work is needed we can place an order for that and you can then stop by our lab to have the test done at a later time.    Telephone visits are billed at different rates depending on your insurance coverage. During this emergency period, for some insurers they may be billed the same as an in-person visit.  Please reach out to your insurance provider with any questions.    If during the course of the call the physician/provider feels a telephone visit is not appropriate, you will not be charged for this service.\"    Patient has given verbal consent to a Telephone visit? Yes        Additional provider notes: F/U on headache and HTN    Had 2 televisits with Dr. Peters on 4/23/20 and 4/24/20 due to headache.  Lopressor was increased to 50 mg two times a day due to elevated BP. Initial BP this morning was 143/93 per daughter in law.  Headache is improving 2/10 today. Taking tylenol, last dose was ~ 8 am this morning. No acute vision changes or weakness.    Still c/o shortness of breath. Had virtual visit with [facility] 2 days ago, note reviewed. Oral prednisone was added. F/U apt scheduled in 4 weeks.    Fasting BS in 90s per daughter in law.     Assessment/Plan:    1. Essential hypertension  Daughter in law recheck BP while on the phone 135/87   Continue increased dose of lopressor 50 mg daily. Continue other meds.   2. Other migraine without status migrainosus, not intractable  2/10 headache today.  Continue tylenol. F/U in one week.     3. " Moderate persistent asthma, unspecified whether complicated  Managed by Pulm    4. Type 2 diabetes mellitus treated without insulin (H)  BS in 90s. Last A1c 6.3 in 1/2020      Phone call duration:  19  minutes    Adrien Cardoza MD

## 2021-06-07 NOTE — TELEPHONE ENCOUNTER
Phone call from relative of patient, English speaking. States Blum has been struggling with a cough for about 5 days now, clear phlegm coming up.  Has had fever. Does not know how high the fever has been, but has been taking tylenol for it.      Due to the nature of her symptoms, patient referred to [email or web address] for screening and recommendations.  Caller stated they would use the [email or web address].

## 2021-06-08 ENCOUNTER — OFFICE VISIT - HEALTHEAST (OUTPATIENT)
Dept: ALLERGY | Facility: CLINIC | Age: 51
End: 2021-06-08

## 2021-06-08 ENCOUNTER — COMMUNICATION - HEALTHEAST (OUTPATIENT)
Dept: PULMONOLOGY | Facility: OTHER | Age: 51
End: 2021-06-08
Payer: COMMERCIAL

## 2021-06-08 ENCOUNTER — AMBULATORY - HEALTHEAST (OUTPATIENT)
Dept: FAMILY MEDICINE | Facility: CLINIC | Age: 51
End: 2021-06-08

## 2021-06-08 DIAGNOSIS — L29.9 ITCHING: ICD-10-CM

## 2021-06-08 DIAGNOSIS — M54.50 BILATERAL LOW BACK PAIN, UNSPECIFIED CHRONICITY, UNSPECIFIED WHETHER SCIATICA PRESENT: ICD-10-CM

## 2021-06-08 NOTE — TELEPHONE ENCOUNTER
Refill Approved    Rx renewed per Medication Renewal Policy. Medication was last renewed on 1/9/20.    Kellie Lott, Care Connection Triage/Med Refill 5/28/2020     Requested Prescriptions   Pending Prescriptions Disp Refills     hydroCHLOROthiazide (MICROZIDE) 12.5 mg capsule [Pharmacy Med Name: HYDROCHLOROTHIAZIDE 12.5 MG 12.5 CAP] 90 capsule 2     Sig: TAKE 1 CAPSULE (12.5 MG TOTAL) BY MOUTH DAILY FOR BLOOD PRESSURE       Diuretics/Combination Diuretics Refill Protocol  Passed - 5/26/2020  9:32 AM        Passed - Visit with PCP or prescribing provider visit in past 12 months     Last office visit with prescriber/PCP: 1/16/2020 Adrien Cardoza MD OR same dept: 1/16/2020 Adrien Cardoza MD OR same specialty: 1/16/2020 Adrien Cardoza MD  Last physical: 9/7/2018 Last MTM visit: 5/21/2019 Malu Muñoz, PharmD   Next visit within 3 mo: Visit date not found  Next physical within 3 mo: Visit date not found  Prescriber OR PCP: Adrien Cardoza MD  Last diagnosis associated with med order: 1. Essential hypertension  - hydroCHLOROthiazide (MICROZIDE) 12.5 mg capsule [Pharmacy Med Name: HYDROCHLOROTHIAZIDE 12.5 MG 12.5 CAP]; TAKE 1 CAPSULE (12.5 MG TOTAL) BY MOUTH DAILY FOR BLOOD PRESSURE  Dispense: 90 capsule; Refill: 2    If protocol passes may refill for 12 months if within 3 months of last provider visit (or a total of 15 months).             Passed - Serum Potassium in past 12 months      Lab Results   Component Value Date    Potassium 3.5 01/09/2020             Passed - Serum Sodium in past 12 months      Lab Results   Component Value Date    Sodium 142 01/09/2020             Passed - Blood pressure on file in past 12 months     BP Readings from Last 1 Encounters:   05/01/20 (!) 143/93             Passed - Serum Creatinine in past 12 months      Creatinine   Date Value Ref Range Status   01/09/2020 0.68 0.60 - 1.10 mg/dL Final

## 2021-06-08 NOTE — TELEPHONE ENCOUNTER
Son calling and the pharmacy that this medication was sent to is closed right now.   States that this pharmacy is open - resending script. Reminded him that patient is due in office for a follow up visit per previous nurses note.     Doris Ennis RN [facility] Care Connection Triage/Med Refill 6/1/2020 1:12 PM

## 2021-06-08 NOTE — PROGRESS NOTES
ASSESMENT AND PLAN:  Diagnoses and all orders for this visit:    Moderate persistent asthma with acute exacerbation  Reviewed the emergency room evaluation notes from the medical record today with the patient and her family with the help of a professional .  The patient has completed her course of oral prednisone.  She is using her inhaled medications properly and feels like her respiratory status is now back to baseline.  She does want to have some additional capsules for her cough suppression as her cough continues to bother her.  We reviewed her x-ray report from the medical record which was negative for pneumonia.  -     benzonatate (TESSALON) 100 MG capsule; Take 1 capsule (100 mg total) by mouth 3 (three) times a day as needed for cough.  Dispense: 42 capsule; Refill: 0  -     ipratropium-albuterol (DUO-NEB) 0.5-2.5 mg/3 mL nebulizer; Take 3 mL by nebulization every 6 (six) hours as needed.  Dispense: 120 vial; Refill: 6    Oral ulcer  Likely viral.  Observation.  Follow-up if not improved over the next week.        SUBJECTIVE: 47-year-old female who was in at the emergency room with shortness of breath and wheezing and coughing.  She was diagnosed with an asthma exacerbation.  She has a past history of moderate persistent asthma and had been taking her inhaled controller medications regularly.  She got a good response to the nebulizer treatments at the emergency room and was given oral steroids and now has completed her short course of prednisone.  She felt rapid improvement in her wheezing and shortness of breath and slow improvement in her cough.  Cough responds well to the cough suppressant capsules that she had been given, she has run out of those.  Since discharge, she has not had any fever or vomiting.  Her strength is improving.  She complains of some bilateral knee pain and sometimes gets associated cramping pain in her calves bilaterally.  No ankle or leg swelling.  Pain is moderate in  severity and does improve with acetaminophen.  These symptoms are not new to her, she's had them for many months.    Past Medical History   Diagnosis Date     Anxiety      Arthritis      Asthma      Back pain      COPD (chronic obstructive pulmonary disease)      Depression      Diabetes mellitus      Generalized headaches      HTN (hypertension)      Pneumonia      Patient Active Problem List   Diagnosis     Acute asthma exacerbation     Asthma exacerbation     Hyperglycemia     HTN (hypertension)     Moderate persistent asthma with acute exacerbation     Insomnia     Pain     Muscle cramps     GERD (gastroesophageal reflux disease)     Current Outpatient Prescriptions   Medication Sig Dispense Refill     acetaminophen (PAIN RELIEF EXTRA STRENGTH) 500 MG tablet TAKE 1 TABLET (500 MG TOTAL) BY MOUTH EVERY 6 (SIX) HOURS AS NEEDED FOR PAIN. 90 tablet 1     albuterol (PROVENTIL HFA;VENTOLIN HFA) 90 mcg/actuation inhaler Inhale 2 puffs every 6 (six) hours as needed for wheezing. 1 Inhaler 2     benzonatate (TESSALON) 100 MG capsule Take 1 capsule (100 mg total) by mouth 3 (three) times a day as needed for cough. 42 capsule 0     hydrochlorothiazide (MICROZIDE) 12.5 mg capsule Take 1 capsule (12.5 mg total) by mouth daily. 90 capsule 1     ipratropium-albuterol (DUO-NEB) 0.5-2.5 mg/3 mL nebulizer Take 3 mL by nebulization every 6 (six) hours as needed. 120 vial 6     mometasone-formoterol (DULERA) 200-5 mcg/actuation HFAA inhaler Inhale 2 puffs 2 (two) times a day. 13 g 2     montelukast (SINGULAIR) 10 mg tablet TAKE 1 TABLET (10 MG TOTAL) BY MOUTH BEDTIME. 30 tablet 2     omeprazole (PRILOSEC) 20 MG capsule Take 1 capsule (20 mg total) by mouth 2 (two) times a day. 60 capsule 11     SPIRIVA RESPIMAT 2.5 mcg/actuation Mist INHALE 2 PUFFS DAILY. 4 g 2     blood glucose test Strp Use to test blood sugar up to 3 times daily.  Contour Next EZ. 50 strip 1     budesonide-formoterol (SYMBICORT) 160-4.5 mcg/actuation inhaler  "Inhale 2 puffs 2 (two) times a day. 1 Inhaler 3     codeine-guaiFENesin (GUAIFENESIN AC)  mg/5 mL liquid Take 5 mL by mouth 3 (three) times a day as needed for cough. 120 mL 0     diphenhydrAMINE (BENADRYL) 25 mg tablet Take 1 tablet (25 mg total) by mouth bedtime as needed for sleep. 30 tablet 2     lancets (MICROLET LANCET) Misc Use to test blood sugars up to 3 times daily. 100 each 0     montelukast (SINGULAIR) 10 mg tablet Take 1 tablet (10 mg total) by mouth bedtime. 30 tablet 2     No current facility-administered medications for this visit.      History   Smoking Status     Former Smoker   Smokeless Tobacco     Never Used       OBJECTICE:   Visit Vitals     /72 (Patient Site: Left Arm, Patient Position: Sitting, Cuff Size: Adult Regular)     Pulse 85     Temp 98.6  F (37  C) (Oral)     Resp 18     Ht 5' 0.25\" (1.53 m)     Wt 126 lb 8 oz (57.4 kg)     SpO2 99%     Breastfeeding No     BMI 24.5 kg/m2        No results found for this or any previous visit (from the past 24 hour(s)).     GEN-alert, appropriate, no apparent distress   HEENT-there is a small 2 mm viral-appearing ulcer on the tip of the tongue, oropharynx otherwise negative, moist mucous membranes.   CV-regular rate and rhythm   RESP-lungs clear to auscultation   EXTREM-warm with no edema and no calf tenderness   SKIN-no acute rash      Chintan Alegria"

## 2021-06-08 NOTE — TELEPHONE ENCOUNTER
RN cannot approve Refill Request    RN can NOT refill this medication med is not covered by policy/route to provider. Last office visit: 1/16/2020 Adrien Cardoza MD Last Physical: 9/7/2018 Last MTM visit: 5/21/2019 Malu Muñoz, PharmD Last visit same specialty: 1/16/2020 Adrien Cardoza MD.  Next visit within 3 mo: Visit date not found  Next physical within 3 mo: Visit date not found      Felipa Valle, Care Connection Triage/Med Refill 6/4/2020    Requested Prescriptions   Pending Prescriptions Disp Refills     ROBAFEN 100 mg/5 mL syrup [Pharmacy Med Name: ROBAFEN 100 MG/5 ML SYRUP 100 SYRP] 236 mL 0     Sig: TAKE 10 ML (200 MG TOTAL) BY MOUTH 3 (THREE) TIMES A DAY AS NEEDED FOR COUGH.       There is no refill protocol information for this order
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
## 2021-06-08 NOTE — TELEPHONE ENCOUNTER
Refill Approved    Rx renewed per Medication Renewal Policy. Medication was last renewed on 4/24/20.    Kellie Lott, [facility] Connection Triage/Med Refill 6/18/2020     Requested Prescriptions   Pending Prescriptions Disp Refills     amitriptyline (ELAVIL) 10 MG tablet [Pharmacy Med Name: AMITRIPTYLINE HCL 10 MG TAB 10 TAB] 90 tablet 0     Sig: TAKE 2 TABLETS (20 MG TOTAL) BY MOUTH AT BEDTIME.       Tricyclics/Misc Antidepressant/Antianxiety Meds Refill Protocol Passed - 6/16/2020 12:47 PM        Passed - PCP or prescribing provider visit in last year     Last office visit with prescriber/PCP: Visit date not found OR same dept: 1/16/2020 Adrien Cardoza MD OR same specialty: 1/16/2020 Adrien Cardoza MD  Last physical: Visit date not found Last MTM visit: 5/21/2019 Malu Muñoz, PharmD   Next visit within 3 mo: Visit date not found  Next physical within 3 mo: Visit date not found  Prescriber OR PCP: Ene Peters MD  Last diagnosis associated with med order: 1. Other migraine without status migrainosus, not intractable  - amitriptyline (ELAVIL) 10 MG tablet [Pharmacy Med Name: AMITRIPTYLINE HCL 10 MG TAB 10 TAB]; Take 2 tablets (20 mg total) by mouth at bedtime.  Dispense: 90 tablet; Refill: 0    If protocol passes may refill for 12 months if within 3 months of last provider visit (or a total of 15 months).

## 2021-06-08 NOTE — TELEPHONE ENCOUNTER
See refill encounter for aspirin - son calling back because other pharmacy that medication was sent to is closed. Resent to open pharmacy     Doris Ennis RN [facility] Care Connection Triage/Med Refill 6/1/2020 1:14 PM

## 2021-06-09 NOTE — PROGRESS NOTES
MTM Transition of Care Encounter  Assessment & Plan                                                     Post Discharge Medication Reconciliation Status: discharge medications reconciled, continue medications without change    1. Coronary artery disease due to lipid rich plaque  Controlled on current therapy, pain improving and appropriately taking celecoxib two times a day. Noted dark stools, but patient also taking iron supplements. Recommended contacting clinic if blood present in stool.     2. Type 2 diabetes mellitus treated without insulin (H)  A1c stable at goal of less than 7%, no changes recommended.     3. Essential hypertension  Stable, BP at goal of less than 140/90, recommend continuation of current therapy with continued monitoring for dizziness.     4. Gastroesophageal reflux disease without esophagitis  Stable on current therapy.     5. Moderate persistent asthma, unspecified whether complicated  Stable, continue current inhalers.    Follow Up  Return in about 1 month (around 8/4/2020) for Primary Care.    Subjective & Objective                                                       Kulwant Amin is a 50 y.o. female called for a transitions of care visit. she was discharged from [facility] on 6/29/20 for chest pain. Professional telephonic Irish  utilized today, spoke with daughter in law Billy.    Patient consented to a telehealth visit: Yes  Chief Complaint: Hospital follow up  Medication Adherence/Access: N sets up pillbox every week. Takes medications four times a day, does not forget medication doses.     Chest pain: No ischemic EKG changes or concern for ACS. Due to improvement with tordol, suggested pericarditis and sent home with celecoxib 400 mg two times a day x 30 days.   She is a lot better now and having less pain than before. Denies shortness of breath and noting only mild chest pain. Stats that they are expecting a call to schedule a follow up cardiology appointment in  August.     CAD: Atorv 80 mg daily, aspirin 81 mg daily, clopidogrel 75 mg daily, metoprolol tartrate 25 mg two times a day. They check her BP at home, today 129/80-90. Endorsing some bruising, and dark stool, been over a year that it has been this way, have discussed previously with PCP.   Lab Results   Component Value Date    HGB 11.5 (L) 2020     T2DM: metformin 1000 mg daily. , 158 today. Denies any low BG.   Lab Results   Component Value Date    HGBA1C 6.3 (H) 2020     HTN: HCTZ 12.5 mg daily. Endorsing dizziness right now but this has been an ongoing problem for her.   BP Readings from Last 3 Encounters:   20 118/83   20 108/74   20 (!) 143/93     Pulse Readings from Last 3 Encounters:   20 77   20 76   20 95     Lab Results   Component Value Date    CREATININE 0.71 2020     GERD:omeprazoole 20 mg two times a day, sucralfate 1 g four times a day. Mag 1.9 on 20. Denies any heartburn.    Asthma: montelukast 10 mg daily, tiotopium 18 mg 2 puffs daily, Breo Ellipta 1 puff daily, spiriva once daily, albuterol inhaler 3-4 times a day constistently. States that the albuterol is recommended on label to take scheduled and not prn. States that her breathing has been well controlled lately. Rinsing her mouth after ICS inhaler use.   ICS/LABA, LAMA, and EVONEN.     Constipation: Miralax daily - states that her stool is now soft and no more trouble with constipation.     PMH: reviewed in EPIC   Allergies/ADRs: reviewed in EPIC   Alcohol: Unable to discuss today.   Tobacco:   Social History     Tobacco Use   Smoking Status Former Smoker     Types: Cigarettes     Last attempt to quit:      Years since quittin.5   Smokeless Tobacco Never Used     Recent Vitals:   BP Readings from Last 3 Encounters:   20 118/83   20 108/74   20 (!) 143/93      Wt Readings from Last 3 Encounters:   20 129 lb (58.5 kg)   20 128 lb 8 oz (58.3 kg)    06/23/20 129 lb (58.5 kg)     ----------------    The patient declined an after visit summary    I spent 37 minutes with this patient today;  . I offer these suggestions with the understanding that I don't fully understand Kulwant's past medical history and the complexity of her health conditions.  Kulwant should make no changes without the approval of his primary care provider.. A copy of the visit note was provided to the patient's provider.     Malu Lane), PharmD  Medication Therapy Management Pharmacist  [facility]    Telemedicine Visit Details    Type of service:  Telephone     Start Time: 1:00 PM  End Time (time video/phone call stopped): 1:37 PM    Originating Location (pt. Location): Home    Distant Location (provider location):  [facility] MEDICATION THERAPY MANAGEMENT [facility]    Mode of Communication:   Telephone     Current Outpatient Medications   Medication Sig Dispense Refill     acetaminophen (TYLENOL) 500 MG tablet Take 2 tablets (1,000 mg total) by mouth every 8 (eight) hours as needed for pain. 90 tablet 10     albuterol (PROVENTIL) 2.5 mg /3 mL (0.083 %) nebulizer solution Take 3 mL (2.5 mg total) by nebulization every 6 (six) hours as needed for wheezing. 150 mL 12     albuterol (VENTOLIN HFA) 90 mcg/actuation inhaler Inhale 2 puffs every 6 (six) hours as needed for wheezing. 1 Inhaler 12     amitriptyline (ELAVIL) 10 MG tablet TAKE 2 TABLETS (20 MG TOTAL) BY MOUTH AT BEDTIME. 180 tablet 3     aspirin 81 MG EC tablet Take 1 tablet (81 mg total) by mouth daily. 30 tablet 11     atorvastatin (LIPITOR) 80 MG tablet Take 1 tablet (80 mg total) by mouth at bedtime. 90 tablet 3     benzonatate (TESSALON) 100 MG capsule Take 1 capsule (100 mg total) by mouth 3 (three) times a day as needed for cough. 90 capsule 0     blood glucose meter (GLUCOMETER) Use 1 each As Directed 3 (three) times a day. Dispense glucometer brand per patient's insurance at pharmacy  discretion.(E11.9) 1 each 0     blood glucose test strips Use 1 each As Directed 3 (three) times a day. Dispense brand per patient's insurance at pharmacy discretion.(E11.9) 300 strip 3     celecoxib (CELEBREX) 400 MG capsule Take 1 capsule (400 mg total) by mouth 2 (two) times a day. 60 capsule 0     clopidogrel (PLAVIX) 75 mg tablet Take 1 tablet (75 mg total) by mouth daily. 30 tablet 0     ferrous sulfate 325 (65 FE) MG tablet Take 1 tablet (325 mg total) by mouth daily with breakfast. 90 tablet 3     fluticasone furoate-vilanterol (BREO ELLIPTA) 200-25 mcg/dose DsDv inhaler Inhale 1 puff daily. 1 each 12     gabapentin (NEURONTIN) 300 MG capsule Take 1 capsule (300 mg total) by mouth 2 (two) times a day. 60 capsule 5     generic lancets Use 1 each As Directed 3 (three) times a day. Dispense brand per patient's insurance at pharmacy discretion.(E11.9) 300 each 3     hydroCHLOROthiazide (MICROZIDE) 12.5 mg capsule TAKE 1 CAPSULE (12.5 MG TOTAL) BY MOUTH DAILY FOR BLOOD PRESSURE 90 capsule 2     INJECT EASE LANCETS 30 gauge Misc USE 1 EACH AS DIRECTED THREE TIMES A DAY *34 DAYS* 100 each 11     insulin aspart U-100 (NOVOLOG) 100 unit/mL injection Inject under the skin 3 (three) times a day with meals. Use per sliding scale < 150 - No insulin, 151-200 use 3 U, 201- 250 use 5 U,   251- 300 use 8 U        metFORMIN (GLUCOPHAGE) 500 MG tablet TAKE 1 TABLET (500 MG TOTAL) BY MOUTH 2 TIMES A DAY WITH MEALS FOR DIABETES 180 tablet 2     metoprolol tartrate (LOPRESSOR) 25 MG tablet Take 25 mg by mouth 2 (two) times a day.       montelukast (SINGULAIR) 10 mg tablet TAKE 1 PILL (10 MG TOTAL) BY MOUTH AT BEDTIME FOR ASTHMA 30 tablet 10     omeprazole (PRILOSEC) 20 MG capsule Take 2 capsules (40 mg total) by mouth daily before breakfast. 30 capsule 12     polyethylene glycol (GLYCOLAX) 17 gram/dose powder MIX 17 GRAMS WITH LIQUID AND DRINK ONCE DAILY. Hold for loose stools. 510 g 12     predniSONE (DELTASONE) 5 MG tablet Take  2.5 mg by mouth daily. 15 tablet 3     ROBAFEN 100 mg/5 mL syrup TAKE 10 ML (200 MG TOTAL) BY MOUTH 3 (THREE) TIMES A DAY AS NEEDED FOR COUGH. 236 mL 0     sucralfate (CARAFATE) 1 gram tablet Take 1 tablet (1 g total) by mouth 4 (four) times a day before meals and at bedtime. Take 1 hour prior to meals 120 tablet 1     tiotropium (SPIRIVA) 18 mcg inhalation capsule Place 1 capsule (2 puffs total) into inhaler and inhale daily. 30 capsule 11     witch hazeL (TUCKS, WITCH HAZEL,) 50 % PadM Apply  to the rectal area as needed for pain. 1 each 0     No current facility-administered medications for this visit.

## 2021-06-09 NOTE — TELEPHONE ENCOUNTER
Received MTM referral from transition of care. Left message through  services. If patient calls back please schedule MTM with Jaylon Muñoz.

## 2021-06-09 NOTE — TELEPHONE ENCOUNTER
Call from Kulwant's son and daughter-in-law.  Her cough has gotten bad.  Asking for cough medication.  She has increased cough and wheezing.  Coughing up some white secretions.  No fever.     Reviewed with Dr. Duong and will prescribe prednisone taper, then back to her daily 1/2 mg dose.

## 2021-06-09 NOTE — TELEPHONE ENCOUNTER
CC:  Dizziness - recently started gabapentin       Call from family and pt       New med started yesterday - gabapentin - wondering if related         Has had a few doses - last dose was last night     Had developed some dizziness / lightheadedness - getting better today though       Also had some nausea but this is also improving as well      Rest of triage is negative for other issues - no chest pain - no heart issues - no vomiting - no diarrhea - no vision changes  - no bleeding       Yes is able to stand / walk       A/P:  > Indicated some dizziness/ lightheadedness can occur with this this medication -  I will message p[juan diegoder to follow up to discuss as well        Tele#   [phone]           Jason Ruby rn         COVID 19 Nurse Triage Plan/Patient Instructions    Please be aware that novel coronavirus (COVID-19) may be circulating in the community. If you develop symptoms such as fever, cough, or SOB or if you have concerns about the presence of another infection including coronavirus (COVID-19), please contact your health care provider or visit [email or web address].     Disposition/Instructions    Patient to stay at home and follow home care protocol based instructions.    Thank you for taking steps to prevent the spread of this virus.  o Limit your contact with others.  o Wear a simple mask to cover your cough.  o Wash your hands well and often.    Resources    [facility]: About COVID-19: [email or web address]    CDC: What to Do If You're Sick: www.cdc.gov/coronavirus/2019-ncov/about/steps-when-sick.html    CDC: Ending Home Isolation: www.cdc.gov/coronavirus/2019-ncov/hcp/disposition-in-home-patients.html     CDC: Caring for Someone: www.cdc.gov/coronavirus/2019-ncov/if-you-are-sick/care-for-someone.html     [facility]: Interim Guidance for Hospital Discharge to Home: www.health.[facility].[place]/diseases/coronavirus/hcp/hospdischarge.pdf    [facility] clinical trials (COVID-19  research studies): [email or web address]     Below are the [employer]-19 hotlines at the [place] [facility] ([facility]). Interpreters are available.   o For health questions: Call 060-359-7542 or 1-309.537.9750 (7 a.m. to 7 p.m.)  o For questions about schools and childcare: Call 502-569-7839 or 1-233.195.1052 (7 a.m. to 7 p.m.)         Reason for Disposition    Taking a medicine that could cause dizziness (e.g., blood pressure medications, diuretics)    Additional Information    Negative: Shock suspected (e.g., cold/pale/clammy skin, too weak to stand, low BP, rapid pulse)    Negative: Difficult to awaken or acting confused (e.g., disoriented, slurred speech)    Negative: Fainted, and still feels dizzy afterwards    Negative: SEVERE difficulty breathing (e.g., struggling for each breath, speaks in single words)    Negative: Overdose (accidental or intentional) of medications    Negative: New neurologic deficit that is present now: * Weakness of the face, arm, or leg on one side of the body * Numbness of the face, arm, or leg on one side of the body * Loss of speech or garbled speech    Negative: Heart beating < 50 beats per minute OR > 140 beats per minute    Negative: Sounds like a life-threatening emergency to the triager    Negative: Chest pain    Negative: Rectal bleeding, bloody stool, or tarry-black stool    Negative: Vomiting is the main symptom    Negative: Diarrhea is the main symptom    Negative: Headache is the main symptom    Negative: Heat exhaustion suspected (i.e., dehydration from heat exposure)    Negative: Patient states that he/she is having an anxiety/panic attack    Negative: SEVERE dizziness (e.g., unable to stand, requires support to walk, feels like passing out now)    Negative: SEVERE headache or neck pain    Negative: Spinning or tilting sensation (vertigo) present now and one or more stroke risk factors (i.e., hypertension, diabetes, prior stroke/TIA, heart attack,  age over 60) (Exception: prior physician evaluation for this AND no different/worse than usual)    Negative: Extra heart beats OR irregular heart beating (i.e., 'palpitations')    Negative: Loss of vision or double vision    Negative: Difficulty breathing    Negative: Drinking very little and has signs of dehydration (e.g., no urine > 12 hours, very dry mouth, very lightheaded)    Negative: Follows bleeding (e.g., stomach, rectum, vagina) (Exception: became dizzy from sight of small amount blood)    Negative: Patient sounds very sick or weak to the triager    Negative: Vomiting occurs with dizziness    Negative: Patient wants to be seen    Negative: Lightheadedness (dizziness) present now, after 2 hours of rest and fluids    Negative: Spinning or tilting sensation (vertigo) present now    Negative: Fever > 103 F (39.4 C)    Negative: Fever > 100.0 F (37.8 C) and has diabetes mellitus or a weak immune system (e.g., HIV positive, cancer chemotherapy, organ transplant, splenectomy, chronic steroids)    Protocols used: DIZZINESS-A-OH

## 2021-06-09 NOTE — TELEPHONE ENCOUNTER
Admission Reconciliation is Completed  Discharge Reconciliation is Not Complete Please advise to take new medication, Gabapentin only once a day at night.    Dr. Adrien Cardoza  6/25/2020 1:01 PM     Admission Reconciliation is Completed  Discharge Reconciliation is Completed

## 2021-06-09 NOTE — TELEPHONE ENCOUNTER
Patient Returning Call  Reason for call:  Referral  Information relayed to patient:  Yes, scheduled appointment for 7/03   Patient has additional questions:  No  If YES, what are your questions/concerns:    Okay to leave a detailed message?: No call back needed

## 2021-06-09 NOTE — PROGRESS NOTES
"Kulwant Amin is a 50 y.o. female who is being evaluated via a billable telephone visit.      The patient has been notified of following:     \"This telephone visit will be conducted via a call between you and your physician/provider. We have found that certain health care needs can be provided without the need for a physical exam.  This service lets us provide the care you need with a short phone conversation.  If a prescription is necessary we can send it directly to your pharmacy.  If lab work is needed we can place an order for that and you can then stop by our lab to have the test done at a later time.    Telephone visits are billed at different rates depending on your insurance coverage. During this emergency period, for some insurers they may be billed the same as an in-person visit.  Please reach out to your insurance provider with any questions.    If during the course of the call the physician/provider feels a telephone visit is not appropriate, you will not be charged for this service.\"    Patient has given verbal consent to a Telephone visit? Yes    What phone number would you like to be contacted at? [phone]    Patient would like to receive their AVS by AVS Preference: Reta.    Additional provider notes:   Assessment/Plan:    50 y.o.-year-old woman with history of organic fuel exposure in the home; visit was conducted through a [place] .  Her PFTs have been previously noted not to be diagnostic due to poor effort, additionally, she has iron deficiency with significant anemia and is not entirely clear how significantly her anemia is contributing to her shortness of breath.  She continues to be compliant with her inhalers but endorses a cough which did not resolve with initiation of steroids.  Notably, her transthoracic echocardiogram and BNP as recently as December were noted to be unremarkable.  For the present we would recommend;    Continue Breo Ellipta 1 puff daily.  She knows to " gargle after using this.    Unable to switch over to Incruse and continues to use Spiriva daily.    I have refilled her prescription for an albuterol inhaler per the daughter-in-law's request.    Given that she has never demonstrated eosinophilia I do not believe that she has an eosinophilic pneumonia driving her symptoms; as previously discussed given that her symptoms are improved on low-dose prednisone we will reinitiate this at 1/2 mg a day and reassess how she is doing.    Continue Protonix 40 mg daily.    I have arranged for a 6-week follow-up.    Tamra Scruggsqvi  Pulmonary and Critical Care  [id number]      Subjective:    Patient ID: Kulwant Amin is a 50 y.o. female.    Ms. Amin is a 49 y.o.female with a past medical history significant for anxiety, arthritis, questionable asthma versus COPD, diabetes, hypertension and a prior history of SVT presents with a follow-up visit for her pulmonary complaints.  Since her last clinic visit with me, she states that she is generally feeling better has no shortness of breath 2 to 3 days a week which is a significant improvement from before.  She continues to have significant dry cough which is very minimally occasionally productive of white phlegm.  She denies fevers, chills, night sweats or change in her weight.  She is presently using her Breo and Spiriva inhaler daily and uses her rescue inhalers 2-3 times a day.  She is able to sleep through the night but will occasionally wake up with coughing episodes.  In the past 4 weeks, how much of the time did your asthma keep you from getting as much done at work, school, or at home?: Some of the time  During the past 4 weeks, how often have you had shortness of breath?: Once or twice a week  During the past 4 weeks, how often did your asthma symptoms (wheezing, coughing, shortness of breath, chest tightness or pain) wake you up at night or earlier in the morning?: 4 or more nights a week  During the past 4 weeks, how often have  you used your rescue inhaler or nebulizer medication (such as albuterol)?: 1 or 2 times per day  How would you rate your asthma control during the past 4 weeks?: Somewhat controlled  ACT Total Score: (!) 13  In the past 12 months, have you visited the emergency room due to your asthma?: Yes  Number of Emergency Room visits in the past 12 months due to asthma: 3  In the past 12 months, have you been hospitalized due to your asthma?: Yes  Number of Hospitalizations due to asthma in the last 12 months: 3      Cough   Associated symptoms include coughing.     Asthma   She complains of cough. Her past medical history is significant for asthma.     Pertinent past medical history:  48-year-old female here for follow-up.  Her breathing is a bit worse than 6 months ago.  She had multiple ER visits and evaluations.  This includes negative PE study.  She had a cath with a 75% LAD lesion which was intervened upon.  Since her catheterization she feels she has more heartburn.  Her breathing is worse with exertion.  Improves with rest.  It is also worse with cold weather.  Warm weather and humid air does not bother.  Symptoms localized to the chest.  Pertinent positives include burning sensation in the chest.  Pertinent negatives include no fever or hemoptysis.    Current Outpatient Medications   Medication Sig Dispense Refill     albuterol (PROVENTIL) 2.5 mg /3 mL (0.083 %) nebulizer solution Take 3 mL (2.5 mg total) by nebulization every 6 (six) hours as needed for wheezing. 150 mL 12     albuterol (VENTOLIN HFA) 90 mcg/actuation inhaler Inhale 2 puffs every 6 (six) hours as needed for wheezing. 1 Inhaler 12     fluticasone furoate-vilanterol (BREO ELLIPTA) 200-25 mcg/dose DsDv inhaler Inhale 1 puff daily. 1 each 12     tiotropium (SPIRIVA) 18 mcg inhalation capsule Place 1 capsule (2 puffs total) into inhaler and inhale daily. 30 capsule 11     acetaminophen (TYLENOL) 500 MG tablet Take 2 tablets (1,000 mg total) by mouth every  8 (eight) hours as needed for pain. 90 tablet 10     aluminum-magnesium hydroxide-simethicone (MAALOX ADVANCED) 200-200-20 mg/5 mL Susp Take 30 mL by mouth every 6 (six) hours as needed (abdominal pain or bloating). 900 mL 2     amitriptyline (ELAVIL) 10 MG tablet TAKE 2 TABLETS (20 MG TOTAL) BY MOUTH AT BEDTIME. 180 tablet 3     aspirin 81 MG EC tablet Take 1 tablet (81 mg total) by mouth daily. 30 tablet 0     atorvastatin (LIPITOR) 80 MG tablet Take 1 tablet (80 mg total) by mouth at bedtime. 90 tablet 3     benzonatate (TESSALON) 100 MG capsule Take 1 capsule (100 mg total) by mouth 3 (three) times a day as needed for cough. 90 capsule 0     blood glucose meter (GLUCOMETER) Use 1 each As Directed 3 (three) times a day. Dispense glucometer brand per patient's insurance at pharmacy discretion.(E11.9) 1 each 0     blood glucose test strips Use 1 each As Directed 3 (three) times a day. Dispense brand per patient's insurance at pharmacy discretion.(E11.9) 300 strip 3     clopidogrel (PLAVIX) 75 mg tablet Take 1 tablet (75 mg total) by mouth daily. 30 tablet 0     cyanocobalamin 1000 MCG tablet Take 1 tablet (1,000 mcg total) by mouth daily. 30 tablet 0     ferrous sulfate 325 (65 FE) MG tablet Take 1 tablet (325 mg total) by mouth daily with breakfast. 90 tablet 3     generic lancets Use 1 each As Directed 3 (three) times a day. Dispense brand per patient's insurance at pharmacy discretion.(E11.9) 300 each 3     hydroCHLOROthiazide (MICROZIDE) 12.5 mg capsule TAKE 1 CAPSULE (12.5 MG TOTAL) BY MOUTH DAILY FOR BLOOD PRESSURE 90 capsule 2     INJECT EASE LANCETS 30 gauge Misc USE 1 EACH AS DIRECTED THREE TIMES A DAY *34 DAYS* 100 each 11     insulin aspart U-100 (NOVOLOG) 100 unit/mL injection Inject under the skin 3 (three) times a day with meals. Use per sliding scale < 150 - No insulin, 151-200 use 3 U, 201- 250 use 5 U,   251- 300 use 8 U        ipratropium (ATROVENT) 0.02 % nebulizer solution Take 2.5 mL (0.5 mg  total) by nebulization 3 (three) times a day. 300 mL 0     metFORMIN (GLUCOPHAGE) 500 MG tablet TAKE 1 TABLET (500 MG TOTAL) BY MOUTH 2 TIMES A DAY WITH MEALS FOR DIABETES 180 tablet 2     metoprolol tartrate (LOPRESSOR) 25 MG tablet Take 2 tablets (50 mg total) by mouth 2 (two) times a day. 30 tablet 0     montelukast (SINGULAIR) 10 mg tablet TAKE 1 PILL (10 MG TOTAL) BY MOUTH AT BEDTIME FOR ASTHMA 30 tablet 10     omeprazole (PRILOSEC) 20 MG capsule Take 2 capsules (40 mg total) by mouth daily before breakfast. 30 capsule 12     ondansetron (ZOFRAN) 4 MG tablet Take 1 tablet (4 mg total) by mouth every 8 (eight) hours as needed for nausea. 12 tablet 0     polyethylene glycol (GLYCOLAX) 17 gram/dose powder MIX 17 GRAMS WITH LIQUID AND DRINK ONCE DAILY. Hold for loose stools. 510 g 12     predniSONE (DELTASONE) 10 mg tablet 40 mg x 7. 20 mg x 7. 10 mg x 7.. 49 tablet 0     ROBAFEN 100 mg/5 mL syrup TAKE 10 ML (200 MG TOTAL) BY MOUTH 3 (THREE) TIMES A DAY AS NEEDED FOR COUGH. 236 mL 0     sucralfate (CARAFATE) 1 gram tablet Take 1 tablet (1 g total) by mouth 4 (four) times a day before meals and at bedtime. Take 1 hour prior to meals 120 tablet 1     witch hazeL (TUCKS, WITCH HAZEL,) 50 % PadM Apply  to the rectal area as needed for pain. 1 each 0     No current facility-administered medications for this visit.          Phone call duration: 16 minutes

## 2021-06-09 NOTE — TELEPHONE ENCOUNTER
Orders being requested: Physical Therapy  When are orders needed by: asaprabha  Where to send Orders: Fax: [phone] OR call [phone] with  Verbal Orders  Okay to leave detailed message?  Yes

## 2021-06-09 NOTE — TELEPHONE ENCOUNTER
Last office visit: 06/24/20  Last ordered: 06/24/2020  Last lab check:   Next appointment: None     Chart reviewed. Please review findings below.     ASSESMENT AND PLAN:  Diagnoses and all orders for this visit:     Type 2 diabetes mellitus treated without insulin (H)  -     Glycosylated Hemoglobin A1c  -     Lipid Cascade FASTING  -     Pneumococcal polysaccharide vaccine 23-valent 1 yo or older, subq/IM  -     Ambulatory referral to Ophthalmology ( daughter-in-law will call for appointment)  A1c 6.3 today.  No med changes.     Pain in both lower legs  -     gabapentin (NEURONTIN) 300 MG capsule; Take 1 capsule (300 mg total) by mouth 2 (two) times a day.  Dispense: 60 capsule; Refill: 5  Discussed side effects including drowsiness.  If not tolerating twice daily dose he can go to nightly dose.     Chronic nonintractable headache, unspecified headache type  Continue amitriptyline.

## 2021-06-09 NOTE — PROGRESS NOTES
"ASSESMENT AND PLAN:  Diagnoses and all orders for this visit:    Moderate persistent asthma without complication  Not on acute exacerbation.  Continue all her medications.  Printed prescription for prednisone 20 mg twice a day for 5 days given to the patient and instructed to fill if experiences worsening cough, wheezing or shortness of breath before next appointment.  Currently no insurance.    Essential hypertension  BMP in 7/17, normal.  Continue hydrochlorothiazide.    GERD (gastroesophageal reflux disease)  Continue omeprazole.       No medications refill given today.  She will call if needs a refill before next appointment.  She would like to follow-up in one month.    SUBJECTIVE: Kulwant Amin is a 47-year-old female with history of asthma, hypertension and GERD here for follow-up.  She uses her inhalers regularly. Last asthma related hospital visit was in 1/17.  She is doing well since her last hospital visit.  Her symptoms are usually worse in the winter months.  She does not need any medication refills today.    Past Medical History:   Diagnosis Date     Anxiety      Arthritis      Asthma      Back pain      COPD (chronic obstructive pulmonary disease)      Depression      Diabetes mellitus      Generalized headaches      HTN (hypertension)      Pneumonia      Patient Active Problem List   Diagnosis     Acute asthma exacerbation     Asthma exacerbation     Hyperglycemia     HTN (hypertension)     Moderate persistent asthma with acute exacerbation     Insomnia     Pain     Muscle cramps     GERD (gastroesophageal reflux disease)       Allergies:  No Known Allergies    History   Smoking Status     Former Smoker   Smokeless Tobacco     Never Used       Review of systems otherwise negative except as listed in HPI.   History   Smoking Status     Former Smoker   Smokeless Tobacco     Never Used       OBJECTICE:   Visit Vitals     /80     Pulse 80     Ht 5' 0.5\" (1.537 m)     Wt 126 lb 3.2 oz (57.2 kg)     " BMI 24.24 kg/m2           GEN-alert,  in no apparent distress.  HEENT-mucous membranes are moist, neck is supple.  CV-regular rate and rhythm with no murmur.   RESP- Mild wheezing, no crackles or rhonchi.  ABDOMEN- Soft , not tender.  EXTREM- No edema.  SKIN-normal        [facility]   3/15/2017

## 2021-06-09 NOTE — PROGRESS NOTES
ASSESMENT AND PLAN:  Diagnoses and all orders for this visit:    Type 2 diabetes mellitus treated without insulin (H)  -     Glycosylated Hemoglobin A1c  -     Lipid Cascade FASTING  -     Pneumococcal polysaccharide vaccine 23-valent 1 yo or older, subq/IM  -     Ambulatory referral to Ophthalmology ( daughter-in-law will call for appointment)  A1c 6.3 today.  No med changes.    Pain in both lower legs  -     gabapentin (NEURONTIN) 300 MG capsule; Take 1 capsule (300 mg total) by mouth 2 (two) times a day.  Dispense: 60 capsule; Refill: 5  Discussed side effects including drowsiness.  If not tolerating twice daily dose he can go to nightly dose.    Chronic nonintractable headache, unspecified headache type  Continue amitriptyline.          SUBJECTIVE: Kulwant Amin is a 50-year-old female with history of poorly controlled asthma, type 2 diabetes, status post coronary artery stent placement and chronic headache here for follow-up.  She had virtual visit with her pulmonology yesterday.  Note reviewed.  Low-dose prednisone was restarted.  He has pulmonology follow-up in 6 weeks.  States her asthma symptoms are improving now.  Denied acute cough, fever or severe shortness of breath.  States she is at her baseline.    She takes amitriptyline 20 mg daily at night for headaches.  No worsening headaches.  She is complaining of worsening lower extremity pain with tingling and numbness the past few months.  No joint swelling.  No injury to the legs.    He has home health nurse helping her with her daily medications.  Daughter-in-law states she takes all her medications as prescribed.    Last cardiology visit was in October 2019, note reviewed.  Recommended to follow-up in 1 year.    Past Medical History:   Diagnosis Date     Acute blood loss anemia      Anxiety      Arthritis      Asthma      Chest wall pain 12/26/2017     COPD (chronic obstructive pulmonary disease) (H)      Depression      Diabetes mellitus (H)       "Diabetes mellitus, type II (H)      Generalized headaches      GERD (gastroesophageal reflux disease)      High cholesterol      History of anesthesia complications     N/V     HTN (hypertension)      Lactic acid acidosis 2018     Pneumonia      SVT (supraventricular tachycardia) (H)      Unstable angina (H) 10/5/2018     Patient Active Problem List   Diagnosis     Asthma exacerbation     HTN (hypertension)     GERD (gastroesophageal reflux disease)     SVT (supraventricular tachycardia) (H)     Chest wall pain     CAD (coronary artery disease)     Type 2 diabetes mellitus treated without insulin (H)     S/P coronary artery stent placement     Moderate persistent asthma     Migraine headache     Cataract     Hyperlipidemia     Chest pain     Rectal bleed     Lower GI bleeding     Blood in stool     SOB (shortness of breath)     Microcytic anemia     Latent tuberculosis     Acute asthma exacerbation     Dizziness     Weakness generalized     Pain in both lower legs     Chronic nonintractable headache, unspecified headache type       Allergies:  No Known Allergies    Social History     Tobacco Use   Smoking Status Former Smoker     Last attempt to quit:      Years since quittin.4   Smokeless Tobacco Never Used       Review of systems otherwise negative except as listed in HPI.   Social History     Tobacco Use   Smoking Status Former Smoker     Last attempt to quit:      Years since quittin.4   Smokeless Tobacco Never Used       OBJECTICE: /74 (Patient Site: Left Arm, Patient Position: Sitting, Cuff Size: Adult Regular)   Pulse 76   Temp 98.4  F (36.9  C) (Oral)   Ht 5' 3\" (1.6 m)   Wt 128 lb 8 oz (58.3 kg)   SpO2 97%   BMI 22.76 kg/m      DATA REVIEWED:  Additional History from Old Records Summarized (2):   Labs Reviewed or Ordered (1):       GEN-alert,  in no apparent distress.  HEENT-mucous membranes are moist, neck is supple.  CV-regular rate and rhythm with no murmur. "   RESP-mild wheezing no crackles or rhonchi.  ABDOMEN- Soft , not tender.  EXTREM-lower extremities - no joint swelling.  No edema.  Mild tenderness above the ankle bilaterally.  SKIN-normal        [facility]   6/24/2020

## 2021-06-09 NOTE — PROGRESS NOTES
"Kulwant Amin is a 50 y.o. female who is being evaluated via a billable telephone visit.      The patient has been notified of following:     \"This telephone visit will be conducted via a call between you and your physician/provider. We have found that certain health care needs can be provided without the need for a physical exam.  This service lets us provide the care you need with a short phone conversation.  If a prescription is necessary we can send it directly to your pharmacy.  If lab work is needed we can place an order for that and you can then stop by our lab to have the test done at a later time.    Telephone visits are billed at different rates depending on your insurance coverage. During this emergency period, for some insurers they may be billed the same as an in-person visit.  Please reach out to your insurance provider with any questions.    If during the course of the call the physician/provider feels a telephone visit is not appropriate, you will not be charged for this service.\"    Patient has given verbal consent to a Telephone visit? Yes    What phone number would you like to be contacted at? [phone]        Additional provider notes: Patient was in the hospital from 6/27/2020 to 6/29/2020 due to chest pain.  She was noted to have diffuse J-point elevation.  No ischemic changes on EKG.  Cardiology was consulted.  No additional cardiac test was recommended by cardiology.  She received Toradol for pain and was sent home with Celebrex.  They have not picked up Celebrex yet.  The chest pain has improved now.  She denied worsening shortness of breath, she has baseline shortness of breath with exertion.  Daughter-in-law states they called cardiology to make follow-up appointment, was told to follow-up in August.  She has upcoming pulmonology appointment for her asthma.    No new problems since hospital discharge.  She needs aspirin refill per daughter-in-law.    Patient was able to speak in full " sentences without difficulties.  No audible wheezing or shortness of breath.  She did not appear to be in acute distress.      Hospital Follow-up Visit:    Hospital/Nursing Home/IP Rehab Facility: [facility]  Date of Admission: 6/27/2020  Date of Discharge: 6/29/2020  Reason(s) for Admission: Chest pain    Was your hospitalization related to COVID-19? No  Problems taking medications regularly:  None  Medication changes since discharge: None  Problems adhering to non-medication therapy:  None    Summary of hospitalization:  [facility] hospital discharge summary reviewed  Diagnostic Tests/Treatments reviewed.  Follow up needed: none  Other Healthcare Providers Involved in Patient s Care:         Homecare  Update since discharge: improved.      Post Discharge Medication Reconciliation: discharge medications reconciled, continue medications without change.  Plan of care communicated with patient and Daughter-in-law              Assessment/Plan:    1. Hospital discharge follow-up  Chest pain improved.    2. Chest pain, unspecified type  Resolved.    3. Coronary artery disease involving native coronary artery of native heart with angina pectoris (H)  - aspirin 81 MG EC tablet; Take 1 tablet (81 mg total) by mouth daily.  Dispense: 30 tablet; Refill: 11    4. Moderate persistent asthma, unspecified whether complicated  Managed by pulmonology.  Appointment scheduled in early August.    Phone call duration:  23 minutes    Dr. Adrien Cardoza  6/30/2020 4:40 PM  This transcription uses voice recognition software, which may contain typographical errors.

## 2021-06-10 ENCOUNTER — COMMUNICATION - HEALTHEAST (OUTPATIENT)
Dept: ALLERGY | Facility: CLINIC | Age: 51
End: 2021-06-10

## 2021-06-10 ENCOUNTER — AMBULATORY - HEALTHEAST (OUTPATIENT)
Dept: ALLERGY | Facility: CLINIC | Age: 51
End: 2021-06-10

## 2021-06-10 ENCOUNTER — COMMUNICATION - HEALTHEAST (OUTPATIENT)
Dept: ENDOCRINOLOGY | Facility: CLINIC | Age: 51
End: 2021-06-10
Payer: COMMERCIAL

## 2021-06-10 DIAGNOSIS — J45.50 SEVERE PERSISTENT ASTHMA WITHOUT COMPLICATION (H): ICD-10-CM

## 2021-06-10 NOTE — TELEPHONE ENCOUNTER
Call from patient's daugter-in-law and son.  Kulwant has been having increased coughing and shortness of breath.  She is taking prednisone 5 mg daily and using all her inhalers and nebs.  No relief.    Will prescribe last prednisone taper and have them schedule follow up with Dr. Duong.

## 2021-06-10 NOTE — PROGRESS NOTES
ASSESMENT AND PLAN:  Diagnoses and all orders for this visit:    Cough  -     guaiFENesin (ROBITUSSIN) 100 mg/5 mL syrup; Take 10 mL (200 mg total) by mouth 3 (three) times a day as needed for cough.  Dispense: 200 mL; Refill: 0  Added Robitussin.  Unclear if she is taking oral steroid taper dose as directed.  Instructed to keep appointment with pulmonology next week.    Type 2 diabetes mellitus treated without insulin (H)  -     generic lancets; Use 1 each As Directed 3 (three) times a day. Dispense brand per patient's insurance at pharmacy discretion.(E11.9)  Dispense: 300 each; Refill: 3  A1c 6.3 a month ago.  No med changes today.    Hyperlipidemia, unspecified hyperlipidemia type  Check lipid at next visit.    Essential hypertension  Controlled.    Chronic nonintractable headache, unspecified headache type  Continue amitriptyline.    Coronary artery disease due to lipid rich plaque  Managed by cardiology.  Her last visit in 10/2019.  Recommended 1 year follow-up, no appointment scheduled yet.        This transcription uses voice recognition software, which may contain typographical errors.      SUBJECTIVE: Kulwant Amin is a 50-year-old female with multiple chronic medical conditions including but not limited to diabetes, hypertension, asthma, chronic headaches and coronary artery disease here with a complaint of cough.  Son called her pulmonologist last week and was prescribed oral steroid taper dose.  Daughter-in-law picked up the medication and home nurse set up her pillbox.  Unclear if she is taking prednisone as directed.  Her daughter-in-law seems to think she has been taking only half tablet a day not taper dose as directed.  They forgot to bring her pillbox.  Oral prednisone is not included in the pill bottles she brought in today.  She has pulmonology appointment coming up next week.  She denied fever or chills.  Denied chest pain.  No other new concerns then ongoing cough today.  No known exposure to  COVID-19.    Past Medical History:   Diagnosis Date     Acute asthma exacerbation 2020     Acute blood loss anemia      Anxiety      Arthritis      Asthma      Asthma exacerbation 2015     Chest wall pain 2017    Added automatically from request for surgery [id number]     COPD (chronic obstructive pulmonary disease) (H)      Coronary artery disease due to lipid rich plaque 2018     Depression      Diabetes mellitus, type II (H)      Essential hypertension 2017     Generalized headaches      GERD (gastroesophageal reflux disease)      History of anesthesia complications     nausea and vomiting     Lactic acid acidosis 2018     Lower GI bleeding      Nonspecific chest pain 2018    2 negative stress tests since coronary stenting in ; in 2020 she had 1 week of this pain with normal cardiac enzymes and relief with Toradol     Pneumonia      SVT (supraventricular tachycardia) (H)      Patient Active Problem List   Diagnosis     GERD (gastroesophageal reflux disease)     Essential hypertension     Coronary artery disease due to lipid rich plaque     Type 2 diabetes mellitus treated without insulin (H)     Moderate persistent asthma     Migraine headache     Hyperlipidemia     Nonspecific chest pain     Microcytic anemia     Latent tuberculosis     Chronic nonintractable headache, unspecified headache type     Chronic obstructive pulmonary disease, unspecified COPD type (H)     Bronchiectasis (H)     Cataracts, bilateral     Dental decay     Immigrant with language difficulty     Anxiety       Allergies:  No Known Allergies    Social History     Tobacco Use   Smoking Status Former Smoker     Types: Cigarettes     Last attempt to quit:      Years since quittin.6   Smokeless Tobacco Never Used       Review of systems otherwise negative except as listed in HPI.   Social History     Tobacco Use   Smoking Status Former Smoker     Types: Cigarettes     Last attempt to quit:   "    Years since quittin.6   Smokeless Tobacco Never Used       OBJECTICE: /76 (Patient Site: Right Arm, Patient Position: Sitting, Cuff Size: Adult Regular)   Pulse 80   Temp 98  F (36.7  C) (Oral)   Ht 5' 3\" (1.6 m)   Wt 131 lb (59.4 kg)   SpO2 98%   BMI 23.21 kg/m      DATA REVIEWED:  Additional History from Old Records Summarized (2):   Labs Reviewed or Ordered (1):       GEN-alert,  in no apparent distress.  HEENT-mucous membranes are moist, neck is supple.  CV-regular rate and rhythm with no murmur.   RESP-wheezing throughout both lung fields.  No crackles or rhonchi.  ABDOMEN- Soft , not tender.  EXTREM- No edema.  SKIN-normal        [place] Nani   2020   "

## 2021-06-10 NOTE — PROGRESS NOTES
Critical access hospital Clinic Note    Name: Kulwant Amin  : 1970   MRN: [id number]    Kulwant Amin is a 50 y.o. female presenting to discuss the following:     CC:   Chief Complaint   Patient presents with     Anorexia     Dysphagia       HPI:  Kulwant was hospitalized at [place] 20 through 20. She is scheduled for hospital follow up tomorrow at primary office.    Here today to discuss shortness of breath and difficulty swallowing. These are the issues she was hospitalized for. Does endorse hospital follow up appointment tomorrow.     DYSPHAGIA SYMPTOMS:   Has had issues for the last 15 days, can't swallow anything solid. Has to prepare soft food for her to swallow. She can swallow liquids okay.When swallowing, she has pain in her neck on right lower side.  She saw GI in the hospital and recommended setting up a swallow study. Previous EGD 2 years ago unremarkable. Recommend following up with [facility] in 2-3 months for clinical assessment and continue omeprazole 40mg daily. Has not had an EGD in the past. Is using the magic mouthwash. Is using it, doesn't think it is helpful. Is also using the carafate before meals and at bedtime.      SHORTNESS OF BREATH:   Scheduled to follow up with pulmonary on 20. Shortness of breath is getting worse. COVID testing was negative 20. Has not finished antibiotics yet. Fevers have resolved post hospitalization. She is using inhalers. She is using Breo Ellipta, Spiriva, albuterol inhaler, and abluterol nebulizer. She is taking her inhalers regularly. She uses albuterol 1-2 times per day. Prednisone was started on , taking a taper. Is afebrile since leaving the hospital. Thinks they need to change the prednisone because doesn't think it is helping.     : [facility]  via telephone [phone]  Also daughter in law present.    ROS:   See HPI above.     PMH:   Patient Active Problem List   Diagnosis     GERD (gastroesophageal reflux disease)     Essential  hypertension     Coronary artery disease due to lipid rich plaque     Type 2 diabetes mellitus treated without insulin (H)     Moderate persistent asthma     Migraine headache     Hyperlipidemia     Nonspecific chest pain     SOB (shortness of breath)     Microcytic anemia     Latent tuberculosis     Chronic nonintractable headache, unspecified headache type     Chronic obstructive pulmonary disease, unspecified COPD type (H)     Bronchiectasis (H)     Cataracts, bilateral     Dental decay     Immigrant with language difficulty     Anxiety     Chest pain     Pneumonia       Past Medical History:   Diagnosis Date     Acute asthma exacerbation 1/6/2020     Acute blood loss anemia      Anxiety      Arthritis      Asthma      Asthma exacerbation 11/19/2015     Chest wall pain 12/26/2017    Added automatically from request for surgery [id number]     COPD (chronic obstructive pulmonary disease) (H)      Coronary artery disease due to lipid rich plaque 1/25/2018     Depression      Diabetes mellitus, type II (H)      Essential hypertension 4/14/2017     Generalized headaches      GERD (gastroesophageal reflux disease)      History of anesthesia complications     nausea and vomiting     Lactic acid acidosis 11/23/2018     Lower GI bleeding      Nonspecific chest pain 12/07/2018    2 negative stress tests since coronary stenting in 2018; in June 2020 she had 1 week of this pain with normal cardiac enzymes and relief with Toradol     Pneumonia      SVT (supraventricular tachycardia) (H)        PSH:   Past Surgical History:   Procedure Laterality Date     CORONARY STENT PLACEMENT  2018     CV CORONARY ANGIOGRAM N/A 1/25/2018    Procedure: Coronary Angiogram;  Surgeon: Angelo Serrano MD;  Location: [facility] Cath Lab;  Service:      WI COLONOSCOPY FLX DX W/COLLJ SPEC WHEN PFRMD N/A 12/10/2018    Procedure: COLONOSCOPY with polypectomy using biopsy forceps;  Surgeon: Eddie Renteria MD;  Location: [facility] GI;  Service:  Gastroenterology     [place] ESOPHAGOGASTRODUODENOSCOPY TRANSORAL DIAGNOSTIC N/A 12/10/2018    Procedure: ESOPHAGOGASTRODUODENOSCOPY (EGD);  Surgeon: Eddie Renteria MD;  Location: [facility];  Service: Gastroenterology         MEDICATIONS:   Current Outpatient Medications on File Prior to Visit   Medication Sig Dispense Refill     acetaminophen (TYLENOL) 500 MG tablet Take 2 tablets (1,000 mg total) by mouth every 8 (eight) hours as needed for pain. 90 tablet 10     albuterol (PROVENTIL) 2.5 mg /3 mL (0.083 %) nebulizer solution Take 3 mL (2.5 mg total) by nebulization every 6 (six) hours as needed for wheezing. 150 mL 12     albuterol (VENTOLIN HFA) 90 mcg/actuation inhaler Inhale 2 puffs every 6 (six) hours as needed for wheezing. 1 Inhaler 12     alum/mag hydrox-simethicone-diphenhydramine-lidocaine (MAGIC MOUTHWASH) suspension Swish and swallow 15 mL 4 (four) times a day as needed. 120 mL 0     amitriptyline (ELAVIL) 10 MG tablet TAKE 2 TABLETS (20 MG TOTAL) BY MOUTH AT BEDTIME. 180 tablet 3     aspirin 81 MG EC tablet Take 1 tablet (81 mg total) by mouth daily. 30 tablet 11     atorvastatin (LIPITOR) 80 MG tablet Take 1 tablet (80 mg total) by mouth at bedtime. 90 tablet 3     blood glucose meter (GLUCOMETER) Use 1 each As Directed 3 (three) times a day. Dispense glucometer brand per patient's insurance at pharmacy discretion.(E11.9) 1 each 0     blood glucose test strips Use 1 each As Directed 3 (three) times a day. Dispense brand per patient's insurance at pharmacy discretion.(E11.9) 300 strip 3     clopidogreL (PLAVIX) 75 mg tablet TAKE 1 TABLET (75 MG TOTAL) BY MOUTH DAILY. 90 tablet 1     colchicine 0.6 mg tablet Take 1 tablet (0.6 mg total) by mouth 2 (two) times a day. 180 tablet 0     ferrous sulfate 325 (65 FE) MG tablet Take 1 tablet (325 mg total) by mouth daily with breakfast. 90 tablet 3     fluticasone furoate-vilanterol (BREO ELLIPTA) 200-25 mcg/dose DsDv inhaler Inhale 1 puff daily. 1 each 12      gabapentin (NEURONTIN) 300 MG capsule Take 1 capsule (300 mg total) by mouth 2 (two) times a day. 60 capsule 5     generic lancets Use 1 each As Directed 3 (three) times a day. Dispense brand per patient's insurance at pharmacy discretion.(E11.9) 300 each 3     guaiFENesin (ROBITUSSIN) 100 mg/5 mL syrup Take 10 mL (200 mg total) by mouth 3 (three) times a day as needed for cough. 200 mL 0     hydroCHLOROthiazide (MICROZIDE) 12.5 mg capsule TAKE 1 CAPSULE (12.5 MG TOTAL) BY MOUTH DAILY FOR BLOOD PRESSURE 90 capsule 2     ibuprofen (ADVIL,MOTRIN) 600 MG tablet Take 1 tablet (600 mg total) by mouth 3 (three) times a day with meals. 21 tablet 0     INJECT EASE LANCETS 30 gauge Misc USE 1 EACH AS DIRECTED THREE TIMES A DAY *34 DAYS* 100 each 11     insulin aspart U-100 (NOVOLOG) 100 unit/mL injection Inject under the skin 3 (three) times a day with meals. Use per sliding scale < 150 - No insulin, 151-200 use 3 U, 201- 250 use 5 U,   251- 300 use 8 U        levoFLOXacin (LEVAQUIN) 750 MG tablet Take 1 tablet (750 mg total) by mouth daily for 7 days. 7 tablet 0     metFORMIN (GLUCOPHAGE) 500 MG tablet TAKE 1 TABLET (500 MG TOTAL) BY MOUTH 2 TIMES A DAY WITH MEALS FOR DIABETES 180 tablet 2     metoprolol tartrate (LOPRESSOR) 25 MG tablet Take 25 mg by mouth 2 (two) times a day.       montelukast (SINGULAIR) 10 mg tablet TAKE 1 PILL (10 MG TOTAL) BY MOUTH AT BEDTIME FOR ASTHMA 30 tablet 10     omeprazole (PRILOSEC) 20 MG capsule Take 2 capsules (40 mg total) by mouth daily before breakfast. 30 capsule 12     polyethylene glycol (GLYCOLAX) 17 gram/dose powder MIX 17 GRAMS WITH LIQUID AND DRINK ONCE DAILY. Hold for loose stools. 510 g 12     predniSONE (DELTASONE) 5 MG tablet Take 20 mg by mouth daily with breakfast for 3 days, THEN 10 mg daily with breakfast for 3 days, THEN 5 mg daily with breakfast. 48 tablet 0     sucralfate (CARAFATE) 1 gram tablet Take 1 tablet (1 g total) by mouth 4 (four) times a day before meals and  at bedtime. Take 1 hour prior to meals 120 tablet 1     tiotropium (SPIRIVA) 18 mcg inhalation capsule Place 1 capsule (2 puffs total) into inhaler and inhale daily. 30 capsule 11     witch hazeL (TUCKS, WITCH HAZEL,) 50 % PadM Apply  to the rectal area as needed for pain. 1 each 0     No current facility-administered medications on file prior to visit.        ALLERGIES:  No Known Allergies    FAMHx:  Family History   Problem Relation Age of Onset     Other Mother          of an intestinal problem     Ulcers Father          of gastritis     Breast cancer Neg Hx        SOCIAL HISTORY:   Social History     Tobacco Use     Smoking status: Former Smoker     Types: Cigarettes     Last attempt to quit:      Years since quittin.6     Smokeless tobacco: Never Used   Substance Use Topics     Alcohol use: No     Drug use: No       PHYSICAL EXAM:   /89   Pulse 80   Temp 96.8  F (36  C) (Oral)   Resp 20   SpO2 98%    GENERAL: Kulwant is a pleasant, non-toxic appearing female, in no acute distress. She is accompanied today by daughter in law.   HEENT: Sclera white, oropharynx pink and moist, no cervical lymphadenopathy, tracheal deviation, or palpable masses. She does endorse tenderness to palpation of right anterior inferior neck just above insertion site if SCM at clavicle.  HEART: Regular rate and rhythm, no murmurs.   LUNGS: Patient is actively coughing during visit. Respirations unlabored, clear bilaterally.   ABDOMEN: Tender to palpation in epigastric region. No guarding, no rigidity, no rebound tenderness.       ASSESSMENT & PLAN:   Kulwant Amin is a 50 y.o. female presenting for evaluation of dysphagia and shortness of breath. These symptoms are chronic in nature. She did address these issues during recent hospitalization and does have a 40 minute hospital follow up visit scheduled with her primary clinic tomorrow. She also has follow up scheduled with pulmonary.    1. Dysphagia, unspecified  type  - Ambulatory referral to Gastroenterology  - XR Esophagram; Future    Patient is to continue her PPI, follow through on barium swallow study as recommended by GI, and follow up with GI as recommended in the hospital. Will order these today to help further evaluation.     2. Shortness of breath  - predniSONE (DELTASONE) 20 MG tablet; Take 40 mg by mouth daily for 3 days, THEN 20 mg daily for 3 days. Then resume previously prescribed taper.  Dispense: 9 tablet; Refill: 0    She was recently diagnosed with pneumonia and is completing antibiotic course. She is not in any acute respiratory distress. She is noticing worsening symptoms with her prednisone taper. We will increase dose of steroid temporarily, then she should resume taper. Follow up with pulmonary as scheduled mid September.     3. Neck pain  - US Neck Limited; Future     Kulwant reports pain in anterior, lower neck. I do not palpate any abnormalities, but due to patient concern, will proceed with ultrasound to further assess for pathology in this location.      RTC: Follow up tomorrow as scheduled with primary clinic for hospital follow up.    Due to language barrier, complex medical history, and multiple complaints, this visit took 50 minutes. She should not be scheduled in 20 minute appointment times in the future. Nursing staff assisted in scheduling her imaging.      Marta Lenz DO

## 2021-06-10 NOTE — TELEPHONE ENCOUNTER
Refill Approved    Rx renewed per Medication Renewal Policy. Medication was last renewed on 1/9/20.    Kellie Lott, [facility] Triage/Med Refill 7/29/2020     Requested Prescriptions   Pending Prescriptions Disp Refills     clopidogreL (PLAVIX) 75 mg tablet [Pharmacy Med Name: CLOPIDOGREL 75 MG TABLET 75 TAB] 90 tablet 1     Sig: TAKE 1 TABLET (75 MG TOTAL) BY MOUTH DAILY.       Clopidogrel/Prasugrel/Ticagrelor Refill Protocol Passed - 7/27/2020  9:37 AM        Passed - PCP or prescribing provider visit in past 6 months       Last office visit with prescriber/PCP: 6/24/2020 OR same dept: 6/24/2020 Adrien Cardoza MD OR same specialty: 6/24/2020 Adrien Cardoza MD Last physical: Visit date not found Last MTM visit: 5/21/2019 Malu Muñoz, PharmD     Next appt within 3 mo: Visit date not found  Next physical within 3 mo: Visit date not found  Prescriber OR PCP: Adrien Cardoza MD  Last diagnosis associated with med order: 1. S/P coronary artery stent placement  - clopidogreL (PLAVIX) 75 mg tablet [Pharmacy Med Name: CLOPIDOGREL 75 MG TABLET 75 TAB]; Take 1 tablet (75 mg total) by mouth daily.  Dispense: 90 tablet; Refill: 1    If protocol passes may refill for 6 months if within 3 months of last provider visit (or a total of 9 months).              Passed - Hemoglobin in past 12 months     Hemoglobin   Date Value Ref Range Status   06/29/2020 11.5 (L) 12.0 - 16.0 g/dL Final

## 2021-06-10 NOTE — PROGRESS NOTES
Assessment/Plan:        Diagnoses and all orders for this visit:    Moderate persistent asthma without complication  -     albuterol (PROVENTIL) 2.5 mg /3 mL (0.083 %) nebulizer solution; Take 3 mL (2.5 mg total) by nebulization every 6 (six) hours as needed for wheezing.  Dispense: 75 mL; Refill: 12    Dyspnea on exertion    Gastroesophageal reflux disease, esophagitis presence not specified    Pulmonary nodule     47-year-old female with a reported history of asthma here for first visit.  I am not certain that she has asthma.  Her pulmonary function tests have never been valid and are not valid today.  Her parenchyma is not very remarkable based on previous CT reads.  She does have pulmonary nodules that were stable previously and can be followed up at some point in the next several months.    I do not recommend making any changes in her inhalers.  She likely has some pulmonary process possibly asthma or COPD related the past exposures typical for causing airways problems.  Her comorbid pain, anxiety and possible musculoskeletal issues complicate her evaluation.  We will follow-up in 3 months to see how she is doing.  She needs to get physical activity of some type in order to keep her functional status up.    A broad differential including extrapulmonary TB needs to be kept in mind for complicated multisystem patients from at risk areas.  However at this time I do not see any evidence for this type of problem.    Medication Plan  Spiriva daily  Dulera 2 puffs twice daily  Albuterol inhaler as needed  Albuterol nebulizer as needed (this replaces albuterol/ipratropium)    I stopped your albuterol/ipratropium nebulizers.    Exercise Plan  5 minutes of gentle exercise every day - whatever is safe and comfortable - gentle walking, chair exercises.        Subjective:    Patient ID: Kulwant Amin is a 47 y.o. female.    HPI Comments: Dyspnea: Started 4 years ago.  Getting worse.  Worse with exertion. Also worse at rest  without warning at times.  Better with rest.  Symptoms localize to the chest.  She has cough.  Minimal sputum clear.  Heart burn/reflux.  She takes ant acid medications.    Asthma: Diagnosed 4 years ago.  She has been on multiple inhalers since then.  She is not clear if the inhalers help or not.  She does take them as directed.  She does not have any adverse reaction to the inhalers.  No clear triggers for her asthma.  Symptoms localized to the chest.  No pertinent positives or negatives beyond what is above.    She did smoke in the past and was exposed to solid fuel cooking during her time in [facility] and [facility].    Her hope is that she could have an inhaler to make her breathing feel better.    Cavity is minimal at home.  This is related to breathing problems and musculoskeletal pain.        Review of Systems  View of systems positive for activity change, chills, fatigue, congestion dental problem sinus pressure mouth sores tongue sores chest tightness cough dyspnea wheezing eye itching light sensitivity visual disturbance palpitations heat intolerance pain in joints dizziness headaches lightheadedness difficulty speaking weakness and sleep disturbance.  Remainder of the 14 system review of systems was negative.        Objective:    Physical Exam   Constitutional: She is oriented to person, place, and time. No distress.   Appears older than stated age   HENT:   Head: Normocephalic and atraumatic.   Nose: Nose normal.   Mouth/Throat: Oropharynx is clear and moist. No oropharyngeal exudate.   Eyes: Conjunctivae are normal. Pupils are equal, round, and reactive to light. Right eye exhibits no discharge. Left eye exhibits no discharge. No scleral icterus.   Neck: Normal range of motion. No JVD present. No tracheal deviation present. No thyromegaly present.   Cardiovascular: Normal rate, regular rhythm and normal heart sounds.  Exam reveals no gallop.    No murmur heard.  Pulmonary/Chest: Effort normal and breath sounds  normal. No stridor. No respiratory distress. She has no wheezes.   Abdominal: Soft. She exhibits no distension. There is no tenderness.   Musculoskeletal: She exhibits no edema or deformity.   Lymphadenopathy:     She has no cervical adenopathy.   Neurological: She is alert and oriented to person, place, and time. No cranial nerve deficit. Coordination normal.   Skin: Skin is warm and dry. She is not diaphoretic. No erythema.   Psychiatric: She has a normal mood and affect. Her behavior is normal. Thought content normal.   Nursing note and vitals reviewed.          Current Outpatient Prescriptions on File Prior to Visit   Medication Sig Dispense Refill     albuterol (PROAIR HFA;PROVENTIL HFA;VENTOLIN HFA) 90 mcg/actuation inhaler Inhale 2 puffs every 6 (six) hours as needed for wheezing. 1 Inhaler 2     ipratropium-albuterol (DUO-NEB) 0.5-2.5 mg/3 mL nebulizer Take 3 mL by nebulization every 6 (six) hours as needed. 120 vial 6     mometasone-formoterol (DULERA) 200-5 mcg/actuation HFAA inhaler Inhale 2 puffs 2 (two) times a day. 13 g 2     tiotropium bromide (SPIRIVA RESPIMAT) 2.5 mcg/actuation Mist Inhale 2 puffs daily. 4 g 2     acetaminophen (PAIN RELIEF EXTRA STRENGTH) 500 MG tablet TAKE 1 TABLET (500 MG TOTAL) BY MOUTH EVERY 6 (SIX) HOURS AS NEEDED FOR PAIN. 90 tablet 1     amitriptyline (ELAVIL) 10 MG tablet Take 1 tablet (10 mg total) by mouth at bedtime. 30 tablet 1     benzonatate (TESSALON) 100 MG capsule Take 1 capsule (100 mg total) by mouth 3 (three) times a day as needed for cough. 42 capsule 0     blood glucose test Strp Use to test blood sugar up to 3 times daily.  Contour Next EZ. 50 strip 1     budesonide-formoterol (SYMBICORT) 160-4.5 mcg/actuation inhaler Inhale 2 puffs 2 (two) times a day. 1 Inhaler 3     codeine-guaiFENesin (GUAIFENESIN AC)  mg/5 mL liquid Take 5 mL by mouth 3 (three) times a day as needed for cough. 120 mL 0     diphenhydrAMINE (BENADRYL) 25 mg tablet Take 1 tablet (25  "mg total) by mouth bedtime as needed for sleep. 30 tablet 2     hydroCHLOROthiazide (MICROZIDE) 12.5 mg capsule Take 1 capsule (12.5 mg total) by mouth daily. 90 capsule 1     lancets (MICROLET LANCET) Misc Use to test blood sugars up to 3 times daily. 100 each 0     montelukast (SINGULAIR) 10 mg tablet TAKE 1 TABLET (10 MG TOTAL) BY MOUTH BEDTIME. 30 tablet 2     omeprazole (PRILOSEC) 20 MG capsule Take 1 capsule (20 mg total) by mouth 2 (two) times a day. 60 capsule 11     No current facility-administered medications on file prior to visit.      /80  Pulse 80  Resp 15  Ht 5' 3\" (1.6 m)  Wt 124 lb (56.2 kg)  SpO2 97% Comment: RA  BMI 21.97 kg/m2    Medical History  Active Ambulatory (Non-Hospital) Problems    Diagnosis     Persistent asthma     Essential hypertension     GERD (gastroesophageal reflux disease)     Muscle cramps     Pain     Insomnia     Moderate persistent asthma with acute exacerbation     Hyperglycemia     HTN (hypertension)     Acute asthma exacerbation     Asthma exacerbation     Past Medical History:   Diagnosis Date     Anxiety      Arthritis      Asthma      Back pain      COPD (chronic obstructive pulmonary disease)      Depression      Diabetes mellitus      Generalized headaches      HTN (hypertension)      Pneumonia         Surgical History  She  has no past surgical history on file.       Social History  Reviewed, and she  reports that she has quit smoking. She has never used smokeless tobacco. She reports that she does not drink alcohol or use illicit drugs.    Born in [facility].  Came to  in 2012.  Lived in [facility] [place] in refugee [place] prior to coming here.  Came directly to [place] in 2012.    Cooked on solid fuel in [facility] and [facility].   Allergies  No Known Allergies Family History  Reviewed, and no history of lung disease                            Data Review - imaging, labs, and ekgs listed below were reviewed by me.  Chest XRay and chest CT images and EKG tracings interpreted " personally.     Past Labs  Admission on 04/10/2017, Discharged on 04/10/2017   Component Date Value     WBC 04/10/2017 7.2      RBC 04/10/2017 4.73      Hemoglobin 04/10/2017 12.0      Hematocrit 04/10/2017 37.7      MCV 04/10/2017 80      MCH 04/10/2017 25.4*     MCHC 04/10/2017 31.8*     RDW 04/10/2017 14.1      Platelets 04/10/2017 218      MPV 04/10/2017 12.6*     Sodium 04/10/2017 140      Potassium 04/10/2017 3.8      Chloride 04/10/2017 109*     CO2 04/10/2017 23      Anion Gap, Calculation 04/10/2017 8      Glucose 04/10/2017 125      Calcium 04/10/2017 9.6      BUN 04/10/2017 9      Creatinine 04/10/2017 0.68      GFR MDRD Af Amer 04/10/2017 >60      GFR MDRD Non Af Amer 04/10/2017 >60      Influenza  A, Rapid Anti* 04/10/2017 No Influenza A antigen detected      Influenza B, Rapid Antig* 04/10/2017 No Influenza B antigen detected      VENTRICULAR RATE 04/10/2017 77      ATRIAL RATE 04/10/2017 77      P-R INTERVAL 04/10/2017 160      QRS DURATION 04/10/2017 90      Q-T INTERVAL 04/10/2017 390      QTC CALCULATION (BEZET) 04/10/2017 441      P Axis 04/10/2017 33      R AXIS 04/10/2017 3      T AXIS 04/10/2017 39      MUSE DIAGNOSIS 04/10/2017                      Value:Sinus rhythm  ST elevation, consider early repolarization, pericarditis, or injury  Abnormal ECG  When compared with ECG of 11-DEC-2015 18:11,  No significant change was found  Confirmed by HANNAH JACOBS MD LOC:[facility] ([id number]) on 4/10/2017 3:16:19 PM         Past Imaging  Xr Chest Pa And Lateral    Result Date: 4/10/2017  XR CHEST PA AND LATERAL 4/10/2017 12:45 PM INDICATION: chest pain COMPARISON: 01/10/2017. FINDINGS: Again noted is granuloma in the right midlung laterally. The lungs are clear of acute infiltrates. No pneumothorax or pleural effusion. Heart size and pulmonary vascularity are normal.    PFTs reviewed today.  They are suggestive of restriction but there is no repeatability and they do not meet criteria for  interpretation.    External records requested by me.  Reviewed by me.  Summarized below.  Multiple evaluations for dyspnea.  Chest CT January 2015  Examination:  CT CHEST W/O CONTRAST     Date:  1/28/2015 8:28 AM      Clinical Information: Bronchiectasis.,Bronchiectasis without acute  exacerbation     Additional Information: Bronchial asthma, bronchiectasis, pulmonary  nodules, chronic cough.     Comparison: 7/25/2014    Technique: Noncontrast helical CT acquisitions acquired from thoracic  inlet through the adrenal glands.    Findings:   There is no axillary, mediastinal, or hilar lymphadenopathy by size  criteria. There is no pericardial effusion.  No acute disease in the lungs. No new pulmonary nodules since  7/12/2013.  Series 3 image 12,  mm right upper lobe lung nodule unchanged.  Series 3 image 23, right lower lobe 5 mm nodule not significant  changed.  On series 3 image 14, 2 mm nodule the left upper lobe unchanged.    Limited axial images of the upper abdomen are unremarkable.     The osseous structures are unremarkable.    CT Chest w/o contrast5/2/2014  [place]  Result Impression   Impression:  Scattered calcified and noncalcified subcentimeter pulmonary nodules  measuring up to 6 mm in the right lower lobe, not significantly  changed since at least 7/12/2013. No new or enlarging pulmonary  nodules. Followup low dose CT per  the Fleischner Society guidelines  is recommended.    I have personally reviewed the examination and initial interpretation  and I agree with the findings.

## 2021-06-10 NOTE — PROGRESS NOTES
"Kulwant Amin is a 50 y.o. female who is being evaluated via a billable telephone visit.      The patient has been notified of following:     \"This telephone visit will be conducted via a call between you and your physician/provider. We have found that certain health care needs can be provided without the need for a physical exam.  This service lets us provide the care you need with a short phone conversation.  If a prescription is necessary we can send it directly to your pharmacy.  If lab work is needed we can place an order for that and you can then stop by our lab to have the test done at a later time.    Telephone visits are billed at different rates depending on your insurance coverage. During this emergency period, for some insurers they may be billed the same as an in-person visit.  Please reach out to your insurance provider with any questions.    If during the course of the call the physician/provider feels a telephone visit is not appropriate, you will not be charged for this service.\"    Patient has given verbal consent to a Telephone visit? Yes    What phone number would you like to be contacted at? [phone]     Patient would like to receive their AVS by AVS Preference: Reta.    Additional provider notes:    Assessment/Plan:    50 y.o.-year-old woman with history of organic fuel exposure in the home; visit was conducted through a [place] .  Her PFTs have been previously noted not to be diagnostic due to poor effort, additionally, she has iron deficiency with significant anemia and is not entirely clear how significantly her anemia is contributing to her shortness of breath.  She continues to be compliant with her inhalers but continues to endorse shortness of breath which improves with initiation of steroids.  Notably, her transthoracic echocardiogram and BNP as recently as December were noted to be unremarkable.  For the present we would recommend;    Continue Breo Ellipta 1 puff " daily.  She knows to gargle after using this.    Continues to use Spiriva daily.    Continue to use 3 times daily albuterol nebs.    Will initiate a low-dose prednisone at 5 mg a day for the next 21 days and will reassess the patient in clinic in 4 weeks to see if this longer lower dose taper helped her.    Continue Protonix 40 mg daily.    I have arranged for a 4-week follow-up.    Tamra ALCANTARA [facility]  Pulmonary and Critical Care  [id number]      Subjective:    Patient ID: Kulwant Amin is a 50 y.o. female.    Ms. Amin is a 49 y.o.female with a past medical history significant for anxiety, arthritis, questionable asthma versus COPD, diabetes, hypertension and a prior history of SVT presents with a follow-up visit for her pulmonary complaints.  At her last clinic visit with me, she continues to feel short of breath and endorses a dry cough.  She is compliant with her Breo and Spiriva inhalers and also states that she uses her albuterol nebulizers 3 times a day which does help to alleviate her symptoms.  She completed a protracted prednisone taper at the last clinic visit but states that since she ended this about a week ago she is more short of breath again.  In the past 4 weeks, how much of the time did your asthma keep you from getting as much done at work, school, or at home?: All of the time  During the past 4 weeks, how often have you had shortness of breath?: More than once a day  During the past 4 weeks, how often did your asthma symptoms (wheezing, coughing, shortness of breath, chest tightness or pain) wake you up at night or earlier in the morning?: 4 or more nights a week  During the past 4 weeks, how often have you used your rescue inhaler or nebulizer medication (such as albuterol)?: 3 or more times per day  How would you rate your asthma control during the past 4 weeks?: Poorly controlled  ACT Total Score: (!) 6  In the past 12 months, have you visited the emergency room due to your asthma?: Yes  Number of  Emergency Room visits in the past 12 months due to asthma: 1  In the past 12 months, have you been hospitalized due to your asthma?: Yes  Number of Hospitalizations due to asthma in the last 12 months: 1    Cough   Associated symptoms include coughing.     Asthma   She complains of cough. Her past medical history is significant for asthma.     Pertinent past medical history:  48-year-old female here for follow-up.  Her breathing is a bit worse than 6 months ago.  She had multiple ER visits and evaluations.  This includes negative PE study.  She had a cath with a 75% LAD lesion which was intervened upon.  Since her catheterization she feels she has more heartburn.  Her breathing is worse with exertion.  Improves with rest.  It is also worse with cold weather.  Warm weather and humid air does not bother.  Symptoms localized to the chest.  Pertinent positives include burning sensation in the chest.  Pertinent negatives include no fever or hemoptysis.    Current Outpatient Medications   Medication Sig Dispense Refill     acetaminophen (TYLENOL) 500 MG tablet Take 2 tablets (1,000 mg total) by mouth every 8 (eight) hours as needed for pain. 90 tablet 10     albuterol (PROVENTIL) 2.5 mg /3 mL (0.083 %) nebulizer solution Take 3 mL (2.5 mg total) by nebulization every 6 (six) hours as needed for wheezing. 150 mL 12     albuterol (VENTOLIN HFA) 90 mcg/actuation inhaler Inhale 2 puffs every 6 (six) hours as needed for wheezing. 1 Inhaler 12     amitriptyline (ELAVIL) 10 MG tablet TAKE 2 TABLETS (20 MG TOTAL) BY MOUTH AT BEDTIME. 180 tablet 3     aspirin 81 MG EC tablet Take 1 tablet (81 mg total) by mouth daily. 30 tablet 11     atorvastatin (LIPITOR) 80 MG tablet Take 1 tablet (80 mg total) by mouth at bedtime. 90 tablet 3     blood glucose meter (GLUCOMETER) Use 1 each As Directed 3 (three) times a day. Dispense glucometer brand per patient's insurance at pharmacy discretion.(E11.9) 1 each 0     blood glucose test strips  Use 1 each As Directed 3 (three) times a day. Dispense brand per patient's insurance at pharmacy discretion.(E11.9) 300 strip 3     clopidogreL (PLAVIX) 75 mg tablet TAKE 1 TABLET (75 MG TOTAL) BY MOUTH DAILY. 90 tablet 1     ferrous sulfate 325 (65 FE) MG tablet Take 1 tablet (325 mg total) by mouth daily with breakfast. 90 tablet 3     fluticasone furoate-vilanterol (BREO ELLIPTA) 200-25 mcg/dose DsDv inhaler Inhale 1 puff daily. 1 each 12     gabapentin (NEURONTIN) 300 MG capsule Take 1 capsule (300 mg total) by mouth 2 (two) times a day. 60 capsule 5     generic lancets Use 1 each As Directed 3 (three) times a day. Dispense brand per patient's insurance at pharmacy discretion.(E11.9) 300 each 3     guaiFENesin (ROBITUSSIN) 100 mg/5 mL syrup Take 10 mL (200 mg total) by mouth 3 (three) times a day as needed for cough. 200 mL 0     hydroCHLOROthiazide (MICROZIDE) 12.5 mg capsule TAKE 1 CAPSULE (12.5 MG TOTAL) BY MOUTH DAILY FOR BLOOD PRESSURE 90 capsule 2     INJECT EASE LANCETS 30 gauge Misc USE 1 EACH AS DIRECTED THREE TIMES A DAY *34 DAYS* 100 each 11     insulin aspart U-100 (NOVOLOG) 100 unit/mL injection Inject under the skin 3 (three) times a day with meals. Use per sliding scale < 150 - No insulin, 151-200 use 3 U, 201- 250 use 5 U,   251- 300 use 8 U        metFORMIN (GLUCOPHAGE) 500 MG tablet TAKE 1 TABLET (500 MG TOTAL) BY MOUTH 2 TIMES A DAY WITH MEALS FOR DIABETES 180 tablet 2     metoprolol tartrate (LOPRESSOR) 25 MG tablet Take 25 mg by mouth 2 (two) times a day.       montelukast (SINGULAIR) 10 mg tablet TAKE 1 PILL (10 MG TOTAL) BY MOUTH AT BEDTIME FOR ASTHMA 30 tablet 10     omeprazole (PRILOSEC) 20 MG capsule Take 2 capsules (40 mg total) by mouth daily before breakfast. 30 capsule 12     polyethylene glycol (GLYCOLAX) 17 gram/dose powder MIX 17 GRAMS WITH LIQUID AND DRINK ONCE DAILY. Hold for loose stools. 510 g 12     predniSONE (DELTASONE) 10 mg tablet Take 4 tablets daily x 3 days, THEN 3  tablets daily x 3 days, THEN 2 tablets daily x 3 days, THEN 1 tablet daily x 3 days, THEN 1/2mg daily. 30 tablet 0     sucralfate (CARAFATE) 1 gram tablet Take 1 tablet (1 g total) by mouth 4 (four) times a day before meals and at bedtime. Take 1 hour prior to meals 120 tablet 1     tiotropium (SPIRIVA) 18 mcg inhalation capsule Place 1 capsule (2 puffs total) into inhaler and inhale daily. 30 capsule 11     witch hazeL (TUCKS, WITCH HAZEL,) 50 % PadM Apply  to the rectal area as needed for pain. 1 each 0     No current facility-administered medications for this visit.        Phone call duration: 10 minutes    Tamra Duong MD  Pulmonary and Critical Care  (p) [phone]

## 2021-06-10 NOTE — TELEPHONE ENCOUNTER
Appointment scheduled for 9/10/20, 9:30 AM    Also - patient is coming in for EDF with Dr Brady 8/28/20  4:20 PM

## 2021-06-10 NOTE — TELEPHONE ENCOUNTER
Called patient to schedule MTM appt with Jaylon Muñoz due to recent hospitalization. If patient calls back please schedule telephone new appt.    Thanks,  Mayra Mccracken, [facility] Coordinator     Yes

## 2021-06-11 NOTE — PROGRESS NOTES
ASSESMENT AND PLAN:  Diagnoses and all orders for this visit:    Dysphagia, unspecified type  Copies of recent XR esophagram, ultrasound neck and x-ray swallow study were given to the patient to take it to her GI appointment.  Patient was sent to specialty schedulers to schedule GI appointment.  Referral placed by hospital.    Throat pain  -     Rapid Strep A Screen-Throat  -     Group A Strep, RNA Direct Detection, Throat  Negative rapid strep.    GERD (gastroesophageal reflux disease)  -     sucralfate (CARAFATE) 1 gram tablet; Take 1 tablet (1 g total) by mouth 4 (four) times a day before meals and at bedtime. Take 1 hour prior to meals  Dispense: 120 tablet; Refill: 1  Discontinued ibuprofen.  Called pharmacy.    Type 2 diabetes mellitus treated without insulin (H)  Continue current management.    Neck pain  Advised to avoid ibuprofen due to epigastric pain.    Coronary artery disease due to lipid rich plaque  She was discharged with colchicine for cardiomyopathy from the hospital.  Advised to continue until she can be seen by cardiology.  Appointment scheduled next week.    Chronic obstructive pulmonary disease, unspecified COPD type (H)  Follow-up with pulmonology next week.    Other orders  -     Influenza, Seasonal Quad, PF =/> 6months    This transcription uses voice recognition software, which may contain typographical errors.        SUBJECTIVE: Kulwant Amin is a 50-year-old female with multiple chronic medical problems including but not limited to uncontrolled asthma, hypertension, coronary artery disease, multiple recent hospitalization due to pneumonia and others here for follow-up.  She had two hospital admissions last month.  Last hospital admission was for pneumonia, chest pain, sepsis and dehydration from 8/19/2020 -8/22/2020.  She had follow-up office visits on 8/27/2020 and 8/28/2020 with 2 different providers, notes reviewed. (See previous notes from my [facility] for detailed history).  She was  given oral prednisone taper dose on 8/27/2020, she she has pulmonology follow-up appointment next week.    She had swallow study, ultrasound neck and x-ray esophagram done recently.  All results are unremarkable.  She was referred to GI upon recent hospital discharge, no appointment scheduled yet.  She is still complaining of difficulty swallowing.  She feels like something is stuck in her throat.    She has not been checking her blood sugar lately.  She denied acute shortness of breath or chest pain today but complaining of burning sensation on her entire chest.    No fever or chills.  But complaining of sore throat for 1 month.  No known exposure to COVID-19.  COVID-19 test was negative on 8/19/2020.    Past Medical History:   Diagnosis Date     Acute asthma exacerbation 1/6/2020     Acute blood loss anemia      Anxiety      Arthritis      Asthma      Asthma exacerbation 11/19/2015     Chest wall pain 12/26/2017    Added automatically from request for surgery [id number]     COPD (chronic obstructive pulmonary disease) (H)      Coronary artery disease due to lipid rich plaque 1/25/2018     Depression      Diabetes mellitus, type II (H)      Essential hypertension 4/14/2017     Generalized headaches      GERD (gastroesophageal reflux disease)      History of anesthesia complications     nausea and vomiting     Lactic acid acidosis 11/23/2018     Lower GI bleeding      Nonspecific chest pain 12/07/2018    2 negative stress tests since coronary stenting in 2018; in June 2020 she had 1 week of this pain with normal cardiac enzymes and relief with Toradol     Pneumonia      SVT (supraventricular tachycardia) (H)      Patient Active Problem List   Diagnosis     GERD (gastroesophageal reflux disease)     Essential hypertension     Coronary artery disease due to lipid rich plaque     Type 2 diabetes mellitus treated without insulin (H)     Moderate persistent asthma     Migraine headache     Hyperlipidemia     Nonspecific  "chest pain     SOB (shortness of breath)     Microcytic anemia     Latent tuberculosis     Chronic nonintractable headache, unspecified headache type     Chronic obstructive pulmonary disease, unspecified COPD type (H)     Bronchiectasis (H)     Cataracts, bilateral     Dental decay     Immigrant with language difficulty     Anxiety     Chest pain     Pneumonia       Allergies:  No Known Allergies    Social History     Tobacco Use   Smoking Status Former Smoker     Types: Cigarettes     Last attempt to quit:      Years since quittin.6   Smokeless Tobacco Never Used   Tobacco Comment    stoped 15 yrs ago       Review of systems otherwise negative except as listed in HPI.   Social History     Tobacco Use   Smoking Status Former Smoker     Types: Cigarettes     Last attempt to quit:      Years since quittin.6   Smokeless Tobacco Never Used   Tobacco Comment    stoped 15 yrs ago       OBJECTICE: BP 98/64 (Patient Site: Right Arm, Patient Position: Sitting, Cuff Size: Adult Regular)   Pulse 99   Temp 98.1  F (36.7  C) (Oral)   Resp 16   Ht 5' 4\" (1.626 m)   Wt 129 lb 1 oz (58.5 kg)   BMI 22.15 kg/m      DATA REVIEWED:  Additional History from Old Records Summarized (2):   Labs Reviewed or Ordered (1):       GEN-alert,  in no apparent distress.  Able to speak in full sentences without difficulties.  HEENT-mucous membranes are moist, neck is supple.  CV-regular rate and rhythm with no murmur.   RESP-lungs are clear today.  ABDOMEN- Soft , no significant tenderness.  EXTREM- No joint swelling or tenderness today.        Adrien Cardoza   2020   "

## 2021-06-11 NOTE — PROGRESS NOTES
Kulwant Cuenca Dr. is scheduled with you on 9/4. Can you update with results of ultrasound due to language barrier? Ultrasound does not show any abnormalities in area of pain (right anterior lower neck).  Message was also sent by [employer] notifying of results.   Thank you,  Marta Lenz, DO

## 2021-06-11 NOTE — PROGRESS NOTES
MTM Transition of Care Encounter  Assessment & Plan                                                     Post Discharge Medication Reconciliation Status: discharge medications reconciled, continue medications without change    1. Pneumonia of left lower lobe due to infectious organism -asymptomatic/improving.  Patient completed an antibiotic course as prescribed and continues prednisone taper as directed.  Patient's breathing has improved, no changes recommended today.  Patient to follow-up with pulmonologist on 9/14/2020.    2. Coronary artery disease due to lipid rich plaque-controlled on current therapy, no changes recommended today.  Of note, patient currently taking colchicine per cardiology, recommend further discussion at next visit, tomorrow regarding continuation.    3. Type 2 diabetes mellitus treated without insulin (H) last A1c stable at goal of less than 7%, no changes recommended.    4. Essential hypertension stable.  BP at goal of less than 140/90, recommend continuation of current therapy with continue monitoring for dizziness.    5. Gastroesophageal reflux disease without esophagitis- stable on current therapy but notes some heartburn, recommend further follow-up with GI on 9/14 as scheduled regarding continuation of current PPI.    6. Moderate persistent asthma, unspecified whether complicated- stable, patient appropriately taking inhalers as prescribed and improvement of breathing since home from the hospital, no changes recommended.  Pulmonology follow-up scheduled for 9/14.    Follow Up  Return in about 26 days (around 10/6/2020) for Next scheduled follow up.    Subjective & Objective                                                       Kulwant Amin is a 50 y.o. female called for a transitions of care visit. she was discharged from [facility] on 8/22/20 for pneumonia. Professional telephonic Czech  utilized today, spoke with daughter in law, Billy.    Patient consented to a telehealth  visit: Yes  Chief Complaint: Hospital follow up  Medication Adherence/Access: HHN sets up pillbox every week. Takes medications four times a day, does not forget medication doses. Family helps her remember to take the medications.     [place]: Completed levofloxacin 750 mg daily x 7 days, prednisone 5 mg daily - has been completing the taper as prescribed. COVID-19 negative. States that patient has been better since discharge. States that her breathing has improved from previously.   Per discharge summary: Chest imaging shows severe pneumonia.  She had signs of sepsis as well.  She was started on broad-spectrum antibiotics.  Cultures no growth culture negative.  She is much better stable for discharge home.      CAD: Atorvastatin 80 mg daily, aspirin 81 mg daily, clopidogrel 75 mg daily, metoprolol tartrate 25 mg two times a day. They check her BP at home, today 129/80-90. Cardiology appt tomorrow, 9/11 with Del Hirsch MD. according to epic notes, patient was also discharged with colchicine 0.6 mg twice daily for cardiomyopathy.  Patient denies any side effects of GI effects at this time.  Lab Results   Component Value Date    CREATININE 0.68 08/21/2020     T2DM: metformin 1000 mg daily and Novolog sliding scale insulin when needed for high BG.  Denies any low BG.   Lab Results   Component Value Date    HGBA1C 6.3 (H) 06/24/2020     HTN: HCTZ 12.5 mg daily. Endorsing dizziness right now but this has been an ongoing problem for her.   BP Readings from Last 3 Encounters:   09/04/20 98/64   08/28/20 100/60   08/27/20 126/89     Pulse Readings from Last 3 Encounters:   09/04/20 99   08/28/20 83   08/27/20 80     Lab Results   Component Value Date    CREATININE 0.68 08/21/2020     GERD: omeprazole 20 mg two times a day, sucralfate 1 g four times a day. Mag 1.9 on 1/7/20. Endorsing heartburn. GI recommended continuing PPI and follow up GI clinic in 2-3 months. Patient states that its scheduled for 9/14.    Per hospital discharge: She did have complaints of pain and swelling fluid but  videofluoroscopy not shown aspiration signs of severe esophagitis.  She was seen by GI and told to continue PPI follow-up GI clinic.     Asthma: montelukast 10 mg daily, Breo Ellipta 1 puff daily, spiriva once daily, albuterol inhaler 3-4 times a day constistently. Patient does have mild cough and shortness of breath but it is a lot better than it was in the hospital. Using albuterol as needed which helps.  Rinsing her mouth after ICS inhaler use.   Appropriately using ICS/LABA, LAMA, and EVONNE.     Constipation: Miralax daily - states that her stool is now soft and no more trouble with constipation.     PMH: reviewed in EPIC   Allergies/ADRs: reviewed in EPIC   Alcohol: None per daughter in law.   Tobacco:   Social History     Tobacco Use   Smoking Status Former Smoker     Types: Cigarettes     Last attempt to quit:      Years since quittin.7   Smokeless Tobacco Never Used   Tobacco Comment    stoped 15 yrs ago     Recent Vitals:   BP Readings from Last 3 Encounters:   20 98/64   20 100/60   20 126/89      Wt Readings from Last 3 Encounters:   20 129 lb 1 oz (58.5 kg)   20 127 lb 2 oz (57.7 kg)   20 132 lb 4.8 oz (60 kg)     ----------------    The patient declined an after visit summary    I spent 37 minutes with this patient today;  . I offer these suggestions with the understanding that I don't fully understand Kulwant's past medical history and the complexity of her health conditions.  Kulwant should make no changes without the approval of his primary care provider.. A copy of the visit note was provided to the patient's provider.     Malu Lane), PharmD  Medication Therapy Management Pharmacist  [facility]    Telemedicine Visit Details    Type of service:  Telephone     Start Time: 9:30 AM  End Time (time video/phone call stopped): 10:07 AM    Originating  Location (pt. Location): Home    Distant Location (provider location):  [facility] MEDICATION THERAPY MANAGEMENT [facility]    Mode of Communication:   Telephone     Current Outpatient Medications   Medication Sig Dispense Refill     acetaminophen (TYLENOL) 500 MG tablet Take 2 tablets (1,000 mg total) by mouth every 8 (eight) hours as needed for pain. 90 tablet 10     albuterol (PROVENTIL) 2.5 mg /3 mL (0.083 %) nebulizer solution Take 3 mL (2.5 mg total) by nebulization every 6 (six) hours as needed for wheezing. 150 mL 12     albuterol (VENTOLIN HFA) 90 mcg/actuation inhaler Inhale 2 puffs every 6 (six) hours as needed for wheezing. 1 Inhaler 12     alum/mag hydrox-simethicone-diphenhydramine-lidocaine (MAGIC MOUTHWASH) suspension Swish and swallow 15 mL 4 (four) times a day as needed. 120 mL 0     amitriptyline (ELAVIL) 10 MG tablet TAKE 2 TABLETS (20 MG TOTAL) BY MOUTH AT BEDTIME. 180 tablet 3     aspirin 81 MG EC tablet Take 1 tablet (81 mg total) by mouth daily. 30 tablet 11     atorvastatin (LIPITOR) 80 MG tablet Take 1 tablet (80 mg total) by mouth at bedtime. 90 tablet 3     blood glucose meter (GLUCOMETER) Use 1 each As Directed 3 (three) times a day. Dispense glucometer brand per patient's insurance at pharmacy discretion.(E11.9) 1 each 0     blood glucose test strips Use 1 each As Directed 3 (three) times a day. Dispense brand per patient's insurance at pharmacy discretion.(E11.9) 300 strip 3     clopidogreL (PLAVIX) 75 mg tablet TAKE 1 TABLET (75 MG TOTAL) BY MOUTH DAILY. 90 tablet 1     colchicine 0.6 mg tablet Take 1 tablet (0.6 mg total) by mouth 2 (two) times a day. 180 tablet 0     ferrous sulfate 325 (65 FE) MG tablet Take 1 tablet (325 mg total) by mouth daily with breakfast. 90 tablet 3     fluticasone furoate-vilanterol (BREO ELLIPTA) 200-25 mcg/dose DsDv inhaler Inhale 1 puff daily. 1 each 12     gabapentin (NEURONTIN) 300 MG capsule Take 1 capsule (300 mg total) by mouth 2 (two) times a  day. 60 capsule 5     generic lancets Use 1 each As Directed 3 (three) times a day. Dispense brand per patient's insurance at pharmacy discretion.(E11.9) 300 each 3     guaiFENesin (ROBITUSSIN) 100 mg/5 mL syrup Take 10 mL (200 mg total) by mouth 3 (three) times a day as needed for cough. 200 mL 0     hydroCHLOROthiazide (MICROZIDE) 12.5 mg capsule TAKE 1 CAPSULE (12.5 MG TOTAL) BY MOUTH DAILY FOR BLOOD PRESSURE 90 capsule 2     INJECT EASE LANCETS 30 gauge Misc USE 1 EACH AS DIRECTED THREE TIMES A DAY *34 DAYS* 100 each 11     insulin aspart U-100 (NOVOLOG) 100 unit/mL injection Inject under the skin 3 (three) times a day with meals. Use per sliding scale < 150 - No insulin, 151-200 use 3 U, 201- 250 use 5 U,   251- 300 use 8 U        metFORMIN (GLUCOPHAGE) 500 MG tablet TAKE 1 TABLET (500 MG TOTAL) BY MOUTH 2 TIMES A DAY WITH MEALS FOR DIABETES 180 tablet 2     metoprolol tartrate (LOPRESSOR) 25 MG tablet Take 25 mg by mouth 2 (two) times a day.       montelukast (SINGULAIR) 10 mg tablet TAKE 1 PILL (10 MG TOTAL) BY MOUTH AT BEDTIME FOR ASTHMA 30 tablet 10     omeprazole (PRILOSEC) 20 MG capsule Take 2 capsules (40 mg total) by mouth daily before breakfast. 30 capsule 12     ondansetron (ZOFRAN-ODT) 4 MG disintegrating tablet Take 1 tablet (4 mg total) by mouth every 8 (eight) hours as needed for nausea. 12 tablet 0     pediatric multivitamin (POLY-VI-SOL) 1,500- unit-mg-unit/mL Drop drops Take 1 mL by mouth daily. 30 mL 0     polyethylene glycol (GLYCOLAX) 17 gram/dose powder MIX 17 GRAMS WITH LIQUID AND DRINK ONCE DAILY. Hold for loose stools. 510 g 12     predniSONE (DELTASONE) 5 MG tablet Take 20 mg by mouth daily with breakfast for 3 days, THEN 10 mg daily with breakfast for 3 days, THEN 5 mg daily with breakfast. 48 tablet 0     sucralfate (CARAFATE) 1 gram tablet Take 1 tablet (1 g total) by mouth 4 (four) times a day before meals and at bedtime. Take 1 hour prior to meals 120 tablet 1     tiotropium  (SPIRIVA) 18 mcg inhalation capsule Place 1 capsule (2 puffs total) into inhaler and inhale daily. 30 capsule 11     witch hazeL (TUCKS, WITCH HAZEL,) 50 % PadM Apply  to the rectal area as needed for pain. 1 each 0     No current facility-administered medications for this visit.

## 2021-06-11 NOTE — PROGRESS NOTES
Pulmonary Clinic Follow Up    Cc: follow up hospitalization, pneumonia    HPI: 50yoF with history of moderate persistent asthma who presented after hospitalization for severe LLL infiltrate. She has had ongoing complaints of chest pain and now complaining of throat pain as well.     Her breathing has improved, so has her coughing but it has not resolved to baseline yet.  She completed her course of Levaquin back in August. Her energy is still low as well.     Regarding her chest pain, this has been ongoing since July. She has had 2 CTAs that were negative for PE. THe quality of pain has not changed. She is on prilosec 40mg Daily as well as magic mouth wash whish she ran out of.     For her asthma, given her difficulties, Dr. Duong placed her on Prednisone 5mg daily prior to her pneumonia. She continues on this now. She takes Breo and spiriva, albuterol as well. Her PFTs were not interpretable due to effort.     Current Outpatient Medications on File Prior to Visit   Medication Sig Dispense Refill     acetaminophen (TYLENOL) 500 MG tablet Take 2 tablets (1,000 mg total) by mouth every 8 (eight) hours as needed for pain. 90 tablet 10     albuterol (PROVENTIL) 2.5 mg /3 mL (0.083 %) nebulizer solution Take 3 mL (2.5 mg total) by nebulization every 6 (six) hours as needed for wheezing. 150 mL 12     albuterol (VENTOLIN HFA) 90 mcg/actuation inhaler Inhale 2 puffs every 6 (six) hours as needed for wheezing. 1 Inhaler 12     amitriptyline (ELAVIL) 10 MG tablet TAKE 2 TABLETS (20 MG TOTAL) BY MOUTH AT BEDTIME. 180 tablet 3     aspirin 81 MG EC tablet Take 1 tablet (81 mg total) by mouth daily. 30 tablet 11     atorvastatin (LIPITOR) 80 MG tablet Take 1 tablet (80 mg total) by mouth at bedtime. 90 tablet 3     blood glucose meter (GLUCOMETER) Use 1 each As Directed 3 (three) times a day. Dispense glucometer brand per patient's insurance at pharmacy discretion.(E11.9) 1 each 0     blood glucose test strips Use 1 each As  Directed 3 (three) times a day. Dispense brand per patient's insurance at pharmacy discretion.(E11.9) 300 strip 3     clopidogreL (PLAVIX) 75 mg tablet TAKE 1 TABLET (75 MG TOTAL) BY MOUTH DAILY. 90 tablet 1     colchicine 0.6 mg tablet Take 1 tablet (0.6 mg total) by mouth 2 (two) times a day. 180 tablet 0     colchicine 0.6 mg tablet Take 1 tablet (0.6 mg total) by mouth daily. 60 tablet 0     ferrous sulfate 325 (65 FE) MG tablet Take 1 tablet (325 mg total) by mouth daily with breakfast. 90 tablet 3     gabapentin (NEURONTIN) 300 MG capsule Take 1 capsule (300 mg total) by mouth 2 (two) times a day. 60 capsule 5     generic lancets Use 1 each As Directed 3 (three) times a day. Dispense brand per patient's insurance at pharmacy discretion.(E11.9) 300 each 3     guaiFENesin (ROBITUSSIN) 100 mg/5 mL syrup Take 10 mL (200 mg total) by mouth 3 (three) times a day as needed for cough. 200 mL 0     hydroCHLOROthiazide (MICROZIDE) 12.5 mg capsule TAKE 1 CAPSULE (12.5 MG TOTAL) BY MOUTH DAILY FOR BLOOD PRESSURE 90 capsule 2     ibuprofen (ADVIL,MOTRIN) 200 MG tablet Take 1 tablet (200 mg total) by mouth 3 (three) times a day for 5 days. 15 tablet 0     INJECT EASE LANCETS 30 gauge Misc USE 1 EACH AS DIRECTED THREE TIMES A DAY *34 DAYS* 100 each 11     insulin aspart U-100 (NOVOLOG) 100 unit/mL injection Inject under the skin 3 (three) times a day with meals. Use per sliding scale < 150 - No insulin, 151-200 use 3 U, 201- 250 use 5 U,   251- 300 use 8 U        metFORMIN (GLUCOPHAGE) 500 MG tablet TAKE 1 TABLET (500 MG TOTAL) BY MOUTH 2 TIMES A DAY WITH MEALS FOR DIABETES 180 tablet 2     metoprolol tartrate (LOPRESSOR) 25 MG tablet Take 25 mg by mouth 2 (two) times a day.       montelukast (SINGULAIR) 10 mg tablet TAKE 1 PILL (10 MG TOTAL) BY MOUTH AT BEDTIME FOR ASTHMA 30 tablet 10     omeprazole (PRILOSEC) 20 MG capsule Take 2 capsules (40 mg total) by mouth daily before breakfast. 30 capsule 12     ondansetron  (ZOFRAN-ODT) 4 MG disintegrating tablet Take 1 tablet (4 mg total) by mouth every 8 (eight) hours as needed for nausea. 12 tablet 0     pediatric multivitamin (POLY-VI-SOL) 1,500- unit-mg-unit/mL Drop drops Take 1 mL by mouth daily. 30 mL 0     polyethylene glycol (GLYCOLAX) 17 gram/dose powder MIX 17 GRAMS WITH LIQUID AND DRINK ONCE DAILY. Hold for loose stools. 510 g 12     predniSONE (DELTASONE) 5 MG tablet Take 20 mg by mouth daily with breakfast for 3 days, THEN 10 mg daily with breakfast for 3 days, THEN 5 mg daily with breakfast. 48 tablet 0     sucralfate (CARAFATE) 1 gram tablet Take 1 tablet (1 g total) by mouth 4 (four) times a day before meals and at bedtime. Take 1 hour prior to meals 120 tablet 1     tiotropium (SPIRIVA) 18 mcg inhalation capsule Place 1 capsule (2 puffs total) into inhaler and inhale daily. 30 capsule 11     witch hazeL (TUCKS, WITCH HAZEL,) 50 % PadM Apply  to the rectal area as needed for pain. 1 each 0     No current facility-administered medications on file prior to visit.      Vitals:    09/14/20 1311   BP: 110/70   Pulse: 86   SpO2: 98%   RA  Gen: uncomfortable female appearing older than stated age  HEENT: no thrush, no evidence of purulence on tonsils.   Neck: no lymphadenopathy, trachea midline  C/V: RRR, S1 and S2  Resp: clear but increased BS on left base  Ext: no edema, cyanosis or clubbing  Neuro: walking with cane, unstable on feet  Skin: no visible lesions.    CT Chest: personally reviewed  EXAM: CTA CHEST PE RUN  LOCATION: [facility]  DATE/TIME: 8/19/2020 9:29 AM     INDICATION: PE suspected, intermediate probability. Positive D-dimer. Cough, CP, tachycardia.  COMPARISON: Frontal view of the chest, 08/13/2020. CT pulmonary angiogram, 06/27/2020  TECHNIQUE: CT chest pulmonary angiogram during arterial phase injection of IV contrast. Multiplanar reformats and MIP reconstructions were performed. Dose reduction techniques were used.   CONTRAST: Iohexol  (Omni) 100 mL     FINDINGS:  ANGIOGRAM CHEST: No evidence of pulmonary embolus. Thoracic aorta is normal in caliber. No CT evidence of right heart strain.     LUNGS AND PLEURA: There is a moderate to large amount of airspace infiltrate involving the inferior half of the right lower lobe, compatible with pneumonia. No pleural effusion or pneumothorax.     MEDIASTINUM/AXILLAE: Heart size is within normal limits. No lymphadenopathy.     UPPER ABDOMEN: Hepatic steatosis.     MUSCULOSKELETAL: Normal.     IMPRESSION:   1.  No evidence of pulmonary embolus.  2.  Moderate to large amount of airspace infiltrate involving the inferior half of the right lower lobe, compatible with pneumonia.  3.  Hepatic steatosis.    ASSESSMENT/PLAN:  1) Severe pneumonia  2) Ongoing chest pain   3) moderate persistent asthma  -Continue breo/spiriva  -GI to do EGD 12/2020, continues PPI  -Refilled magic mouth wash  -Although early, will rescan given the severity of her symptoms to ensure significant improvement in pneumonia  -RTC 4 weeks with me or laurelwhitney Almendarez MD

## 2021-06-11 NOTE — TELEPHONE ENCOUNTER
----- Message from Emil Haas MD sent at 9/22/2020  7:34 AM CDT -----  Please call the patient and inform her that her lab tests was negative, if her symptoms continue she should consult her PCP regarding this thank you

## 2021-06-11 NOTE — PROGRESS NOTES
"SUBJECTIVE: Kulwant Amin is a 50 y.o. female with:  Chief Complaint   Patient presents with     Asthma     and neck pain    She is a complex patient recently hospitalized with pneumonia on 8/19/20.  She is here with daughter who provides some history.  [facility]  by [facility]:  Her hospital diagnoses include:  Pneumonia now improved  Sepsis resolved with antibiotics and hydration  Lactic acidosis from sepsis resolved  Chest pain intermittent recent diagnosis of pericarditis on tapering dose of steroids  COPD asthma compensated  Essential hypertension controlled  Diabetes type 2 on metformin  CAD unremarkable EKG no chest pain currently    She was discharged on Levaquin/ prednisone taper.  She complained of dysphagia and had swallowing study which showed:  IMPRESSION:      1.  No penetration or aspiration observed.  2.  Prolonged inversion of the epiglottis.  3.  Question wall thickening of the upper esophagus.     She was advised to continue on PPI and F/U with GI.  She continues to have trouble with swallowing - mostly solids- and is not eating much.  She also complains of some neck pain.  She was seen in [facility] yesterday with complaints of shortness of breath / neck pain / swallowing issues:    1) Has trouble swallowing solids.  Ok with liquids.  Daughter asking about taking multivitamins.  She is taking omeprazole/ Carafate / Magic mouthwash but still having issues.  Has ultrasound of neck and esophagram ordered for August 31.  She also has had some nausea with intermittent vomiting. Daughter asks about medicine for the nausea.    2) Shortness of breath - She was r/o for MI in hospital.  Had normal cardiac echo/ normal BNP.  Her prednisone was increased just yesterday.    OBJECTIVE: /60 (Patient Site: Left Arm, Patient Position: Sitting, Cuff Size: Adult Regular)   Pulse 83   Temp 98.7  F (37.1  C) (Oral)   Resp 28   Ht 5' 4\" (1.626 m)   Wt 127 lb 2 oz (57.7 kg)   " SpO2 97%   BMI 21.82 kg/m   no distress  Lungs: Clear to auscultation.  No retractions or tachypnea.  CV: RRR. S1 and S2 normal.  No murmurs, rubs or gallops.  Abdomen: Soft. NT. ND. No HSM or masses.    Wt Readings from Last 3 Encounters:   08/28/20 127 lb 2 oz (57.7 kg)   08/21/20 132 lb 4.8 oz (60 kg)   08/15/20 127 lb 1.6 oz (57.7 kg)     Kulwant was seen today for asthma.    Diagnoses and all orders for this visit:    Shortness of breath - Her vitals are stable and normal exam.  I suspect secondary to pneumonia/ COPD.  I advised that the increased prednisone dose ordered yesterday may take a little longer to take effect.  F/U next week with Adrien Cardoza MD .  I was unable to do lab work as lab was closed.  -     Cancel: Comprehensive Metabolic Panel  -     Cancel: HM2(CBC w/o Differential)    Nausea  -     ondansetron (ZOFRAN-ODT) 4 MG disintegrating tablet; Take 1 tablet (4 mg total) by mouth every 8 (eight) hours as needed for nausea.    Nutritional deficiency  -     pediatric multivitamin (POLY-VI-SOL) 1,500- unit-mg-unit/mL Drop drops; Take 1 mL by mouth daily.    Pneumonia due to Pneumocystis jirovecii, unspecified laterality, unspecified part of lung (H)- She has been on a course of Levaquin.  Repeat CXR in 8 weeks.    Dysphagia, unspecified type- I advised that we need more testing to determine cause.  I recommended she focus on soup / pureed cae thinned with water.  Ginger might be helpful to increase gastric motility.  -     alum/mag hydrox-simethicone-diphenhydramine-lidocaine (MAGIC MOUTHWASH) suspension; Swish and swallow 15 mL 4 (four) times a day as needed.     Nel Brady

## 2021-06-11 NOTE — TELEPHONE ENCOUNTER
Wellness Screening Tool  Symptom Screening:  Do you have one of the following NEW symptoms:    Fever (subjective or >100.0)?  No    A new cough?  No    Shortness of breath?  No     Chills? No     New loss of taste or smell? No     Generalized body aches? No     New persistent headache? No     New sore throat? No     Nausea, vomiting, or diarrhea?  No    Within the past 2 weeks, have you been exposed to someone with a known positive illness below:    COVID-19 (known or suspected)?  No    Chicken pox?  No    Mealses?  No    Pertussis?  No    Patient notified of visitor policy- They may have one person accompany them to their appointment, but they will need to wear a mask and will be screened upon arrival for symptoms: Yes  Pt informed to wear a mask: Yes  Pt notified if they develop any symptoms listed above, prior to their appointment, they are to call the clinic directly at [phone] for further instructions.  Yes  Patient's appointment status: Patient will be seen in clinic as scheduled on 9/11

## 2021-06-11 NOTE — PROGRESS NOTES
ASSESMENT AND PLAN:  Diagnoses and all orders for this visit:  1. Moderate persistent asthma with acute exacerbation  Recently seen in the ER.  Completed oral prednisone and antibiotic.  Resumed previous regimen.  No refill needed today.    2. GERD (gastroesophageal reflux disease)  Omeprazole not covered by insurance.  We will try cimetidine.  - cimetidine (HEARTBURN RELIEF, CIMETIDINE,) 200 MG tablet; Take 2 tablets (400 mg total) by mouth at bedtime.  Dispense: 60 tablet; Refill: 5    3. Essential hypertension  Controlled with current medication.  No refill needed today.        SUBJECTIVE: Kulwant Amin is a 47-year-old female with history of asthma, hypertension, anxiety here for ER follow-up.  She was in the ER on 6/7/2017 with worsening shortness of breath.    She was also having subjective fever and headache.  Chest x-ray was negative.  She received nebulizer treatment and IV Solu-Medrol.  She was sent home with Zithromax and Medrol dose pack.  States she is doing much better now.  She finished all the medications from the ER.  No acute fever, shortness of breath or wheezing today.   She has been to the ER multiple times in the recent months. She was referred to pulmonology for asthma and pulmonary nodule, she was seen in 2 months ago. No medication changes were made by pulmonology.      She has been on omeprazole for reflux.  Her insurance no longer cover omeprazole, requesting alternative medication.  She is still having heartburn on and off, worse when she is hungry.      Past Medical History:   Diagnosis Date     Anxiety      Arthritis      Asthma      Back pain      COPD (chronic obstructive pulmonary disease)      Depression      Diabetes mellitus      Generalized headaches      HTN (hypertension)      Pneumonia      Patient Active Problem List   Diagnosis     Hyperglycemia     HTN (hypertension)     Moderate persistent asthma with acute exacerbation     Insomnia     Pain     Muscle cramps     GERD  (gastroesophageal reflux disease)       Allergies:  No Known Allergies    History   Smoking Status     Former Smoker   Smokeless Tobacco     Never Used       Review of systems otherwise negative except as listed in HPI.   History   Smoking Status     Former Smoker   Smokeless Tobacco     Never Used       OBJECTICE: /74 (Patient Site: Right Arm, Patient Position: Sitting, Cuff Size: Adult Regular)  Pulse 94  Temp 98.5  F (36.9  C) (Oral)   Wt 132 lb 7 oz (60.1 kg)  SpO2 97%  BMI 23.46 kg/m2    DATA REVIEWED:  Additional History from Old Records Summarized (2):         GEN-alert,  in no apparent distress.  HEENT-mucous membranes are moist, neck is supple.  CV-regular rate and rhythm with no murmur.   RESP-no wheezing today.  No crackles or rhonchi.  ABDOMEN- Soft , not tender.  EXTREM- No edema.  SKIN-normal        [facility]   6/21/2017   This transcription uses voice recognition software, which may contain typographical errors.

## 2021-06-11 NOTE — PROGRESS NOTES
ASSESMENT AND PLAN:  Diagnoses and all orders for this visit:    Pain  -     amitriptyline (ELAVIL) 10 MG tablet; Take 1 tablet (10 mg total) by mouth at bedtime.  Dispense: 30 tablet; Refill: 5  Back, arms and legs pain improving with amitriptyline.  Continue 10 mg at bedtime.  I suggested to follow-up with me in 6 months but patient requested to be seen for follow-up in 3 months for her pain issues.    Moderate persistent asthma with acute exacerbation  She has appointment with Dr. Genao next month.  Spiriva refilled per patient's request to last until she can be seen by her pulmonologist.    Other orders  -     tiotropium (SPIRIVA WITH HANDIHALER) 18 mcg inhalation capsule; Place 1 capsule (2 puffs total) into inhaler and inhale daily. Place 1 capsule into the inhaler device once a day  Dispense: 30 capsule; Refill: 0  -     codeine-guaiFENesin (GUAIFENESIN AC)  mg/5 mL liquid; Take 5 mL by mouth 3 (three) times a day as needed for cough.  Dispense: 120 mL; Refill: 0        SUBJECTIVE: Kulwant Amin is here to follow-up on back pain, multiple joint pain and leg pain.  She was started on amitriptyline 10 mg at bedtime.  She states pain has significantly improved with amitriptyline.  She is sleeping better at night.  No side effects reported.    She is requesting refill for Spiriva.  She has appointment to see her pulmonologist next month.  She is still complaining of occasional cough and still taking codeine cough syrup.  Needing refill also.  Past Medical History:   Diagnosis Date     Anxiety      Arthritis      Asthma      Back pain      COPD (chronic obstructive pulmonary disease)      Depression      Diabetes mellitus      Generalized headaches      HTN (hypertension)      Pneumonia      Patient Active Problem List   Diagnosis     Hyperglycemia     HTN (hypertension)     Moderate persistent asthma with acute exacerbation     Insomnia     Pain     Muscle cramps     GERD (gastroesophageal reflux disease)  "      Allergies:  No Known Allergies    History   Smoking Status     Former Smoker   Smokeless Tobacco     Never Used       Review of systems otherwise negative except as listed in HPI.   History   Smoking Status     Former Smoker   Smokeless Tobacco     Never Used       OBJECTICE: /80  Pulse (!) 16  Temp 98.2  F (36.8  C) (Oral)   Resp 16  Ht 5' 3\" (1.6 m)  Wt 131 lb 6.4 oz (59.6 kg)  SpO2 96%  BMI 23.28 kg/m2          GEN-alert,  in no apparent distress.  HEENT-mucous membranes are moist, neck is supple.  CV-regular rate and rhythm with no murmur.   RESP-no wheezing today.  ABDOMEN- Soft , not tender.  EXTREM- No joint swelling, tenderness or limited range of motion.  BACK-no tenderness today.  SKIN-normal        [facility]   5/31/2017   This transcription uses voice recognition software, which may contain typographical errors.      "

## 2021-06-11 NOTE — PROGRESS NOTES
ASSESSMENT and plan  1. Nutritional deficiency    - pediatric multivitamin (POLY-VI-SOL) 1,500- unit-mg-unit/mL Drop drops; Take 1 mL by mouth daily.  Dispense: 30 mL; Refill: 0    2. Pharyngitis due to other organism  Complains of a sore throat.  The pain is persisting all the time.  It is been present for months no fever no chills no inability to swallow she is already had a work-up for dysphagia.  We will recheck an H. pylori test as she cannot eat spicy food.  - H. pylori Antigen, Stool(HPSAG); Future    3. Type 2 diabetes mellitus treated without insulin (H)  Her daughter helps her manage her blood sugars.  The metformin is causing her to have loose stools.  She is taking her insulin.  She has not noticed that the prednisone has made her blood sugars higher    4. Moderate persistent asthma without complication  She sees pulmonology regularly.  She is been taking her inhalers she has some expiratory wheezing present at the end of the day no new shortness of breath currently she is taking prednisone.    5. Essential hypertension    Blood pressures well controlled saw cardiology in the recent past no changes to medication made she has access to all her medications no dizziness noted.    6. Pure hypercholesterolemia  She has had her cholesterol checked recently on a maximal dose of Lipitor no side effects noted    7. Coronary artery disease due to lipid rich plaque  No chest pain noted on Lopressor.    There are no Patient Instructions on file for this visit.    Orders Placed This Encounter   Procedures     H. pylori Antigen, Stool(HPSAG)     Standing Status:   Future     Standing Expiration Date:   9/16/2021     Medications Discontinued During This Encounter   Medication Reason     pediatric multivitamin (POLY-VI-SOL) 1,500- unit-mg-unit/mL Drop drops Reorder       No follow-ups on file.    CHIEF COMPLAINT:  Chief Complaint   Patient presents with     Sore Throat       HISTORY OF PRESENT ILLNESS:  Kulwant  "is a 50 y.o. female who is here with her daughter for follow-up of pharyngitis.  She has had a sore throat persistently for about a month.  She has a complex medical history for asthma, coronary artery disease, hypertension, past history of COPD chest wall pain.  She has seen both her cardiologist and pulmonologist in the last 3 weeks.  Her daughter states that her mother does not eat well despite taking omeprazole and sucralfate.  She says he has a burning sensation in her mouth which is different from the burning in her chest.  The burning does not wake her up at night nor does it because it it feels nauseated she has had separate nausea for many years.  Her weight is about the same.  No one at home is been ill she has no infectious contacts.    REVIEW OF SYSTEMS:     General positive for fatigue because of the throat pain  HEENT positive for persistent throat pain  Pulmonary positive for shortness of breath and wheezing  Musculoskeletal positive for chest wall pain in the center of her chest  CNS positive for occasional headaches  10 point review of  All other systems are negative.    PFSH:    Social history reviewed    TOBACCO USE:  Social History     Tobacco Use   Smoking Status Former Smoker     Types: Cigarettes     Last attempt to quit:      Years since quittin.7   Smokeless Tobacco Never Used   Tobacco Comment    stoped 15 yrs ago       VITALS:  Vitals:    20 1100   BP: 96/70   Pulse: 81   Resp: 24   Temp: 97.9  F (36.6  C)   TempSrc: Oral   SpO2: 98%   Weight: 129 lb 8 oz (58.7 kg)   Height: 5' 4\" (1.626 m)     Wt Readings from Last 3 Encounters:   20 129 lb 8 oz (58.7 kg)   20 130 lb (59 kg)   20 130 lb (59 kg)       PHYSICAL EXAM:  Quiet interactive middle-aged lady seen in the exam room in no acute distress  HEENT neck supple mucous members moist, oral cavity shows no exudate no erythema.  Nasal passages are patent TMs are clear there is no sinus tenderness  Respiratory " system moderate inspiratory effort expiratory wheezing noted at both bases.  CVS regular rate and rhythm no murmurs rubs gallops appreciated  Abdomen soft there is no focal tenderness bowel sounds are present    DATA REVIEWED:  Additional History from Old Records Summarized (2): Reviewed notes from Dr. Almendarez from pulmonology kept her on prednisone and is scheduled for repeat CT scan.  Also reviewed notes from Dr. Hirsch from cardiology.  Decision to Obtain Records (1): 0  Radiology Tests Summarized or Ordered (1): 0  Labs Reviewed or Ordered (1): 1  Medicine Test Summarized or Ordered (1): 0  Independent Review of EKG or X-RAY(2 each): 0    The visit lasted a total of 25 minutes face to face with the patient. Over 50% of the time was spent counseling and educating the patient about   Coronary artery disease, hypertension, potential H. pylori infection, asthma..    MEDICATIONS:  Current Outpatient Medications   Medication Sig Dispense Refill     acetaminophen (TYLENOL) 500 MG tablet Take 2 tablets (1,000 mg total) by mouth every 8 (eight) hours as needed for pain. 90 tablet 10     albuterol (PROVENTIL) 2.5 mg /3 mL (0.083 %) nebulizer solution Take 3 mL (2.5 mg total) by nebulization every 6 (six) hours as needed for wheezing. 150 mL 12     albuterol (VENTOLIN HFA) 90 mcg/actuation inhaler Inhale 2 puffs every 6 (six) hours as needed for wheezing. 1 Inhaler 12     alum/mag hydrox-simethicone-diphenhydramine-lidocaine (MAGIC MOUTHWASH) suspension Swish and swallow 15 mL 4 (four) times a day as needed. 240 mL 2     amitriptyline (ELAVIL) 10 MG tablet TAKE 2 TABLETS (20 MG TOTAL) BY MOUTH AT BEDTIME. 180 tablet 3     aspirin 81 MG EC tablet Take 1 tablet (81 mg total) by mouth daily. 30 tablet 11     atorvastatin (LIPITOR) 80 MG tablet Take 1 tablet (80 mg total) by mouth at bedtime. 90 tablet 3     blood glucose meter (GLUCOMETER) Use 1 each As Directed 3 (three) times a day. Dispense glucometer brand per patient's  insurance at pharmacy discretion.(E11.9) 1 each 0     blood glucose test strips Use 1 each As Directed 3 (three) times a day. Dispense brand per patient's insurance at pharmacy discretion.(E11.9) 300 strip 3     clopidogreL (PLAVIX) 75 mg tablet TAKE 1 TABLET (75 MG TOTAL) BY MOUTH DAILY. 90 tablet 1     colchicine 0.6 mg tablet Take 1 tablet (0.6 mg total) by mouth 2 (two) times a day. 180 tablet 0     colchicine 0.6 mg tablet Take 1 tablet (0.6 mg total) by mouth daily. 60 tablet 0     ferrous sulfate 325 (65 FE) MG tablet Take 1 tablet (325 mg total) by mouth daily with breakfast. 90 tablet 3     fluticasone furoate-vilanteroL (BREO ELLIPTA) 200-25 mcg/dose DsDv inhaler Inhale 1 puff daily. 1 each 12     gabapentin (NEURONTIN) 300 MG capsule Take 1 capsule (300 mg total) by mouth 2 (two) times a day. 60 capsule 5     generic lancets Use 1 each As Directed 3 (three) times a day. Dispense brand per patient's insurance at pharmacy discretion.(E11.9) 300 each 3     guaiFENesin (ROBITUSSIN) 100 mg/5 mL syrup Take 10 mL (200 mg total) by mouth 3 (three) times a day as needed for cough. 200 mL 0     hydroCHLOROthiazide (MICROZIDE) 12.5 mg capsule TAKE 1 CAPSULE (12.5 MG TOTAL) BY MOUTH DAILY FOR BLOOD PRESSURE 90 capsule 2     ibuprofen (ADVIL,MOTRIN) 200 MG tablet Take 1 tablet (200 mg total) by mouth 3 (three) times a day for 5 days. 15 tablet 0     INJECT EASE LANCETS 30 gauge Misc USE 1 EACH AS DIRECTED THREE TIMES A DAY *34 DAYS* 100 each 11     insulin aspart U-100 (NOVOLOG) 100 unit/mL injection Inject under the skin 3 (three) times a day with meals. Use per sliding scale < 150 - No insulin, 151-200 use 3 U, 201- 250 use 5 U,   251- 300 use 8 U        metFORMIN (GLUCOPHAGE) 500 MG tablet TAKE 1 TABLET (500 MG TOTAL) BY MOUTH 2 TIMES A DAY WITH MEALS FOR DIABETES 180 tablet 2     metoprolol tartrate (LOPRESSOR) 25 MG tablet Take 25 mg by mouth 2 (two) times a day.       montelukast (SINGULAIR) 10 mg tablet TAKE 1  PILL (10 MG TOTAL) BY MOUTH AT BEDTIME FOR ASTHMA 30 tablet 10     omeprazole (PRILOSEC) 20 MG capsule Take 2 capsules (40 mg total) by mouth daily before breakfast. 30 capsule 12     ondansetron (ZOFRAN-ODT) 4 MG disintegrating tablet Take 1 tablet (4 mg total) by mouth every 8 (eight) hours as needed for nausea. 12 tablet 0     pediatric multivitamin (POLY-VI-SOL) 1,500- unit-mg-unit/mL Drop drops Take 1 mL by mouth daily. 30 mL 0     polyethylene glycol (GLYCOLAX) 17 gram/dose powder MIX 17 GRAMS WITH LIQUID AND DRINK ONCE DAILY. Hold for loose stools. 510 g 12     predniSONE (DELTASONE) 5 MG tablet Take 20 mg by mouth daily with breakfast for 3 days, THEN 10 mg daily with breakfast for 3 days, THEN 5 mg daily with breakfast. 48 tablet 0     sucralfate (CARAFATE) 1 gram tablet Take 1 tablet (1 g total) by mouth 4 (four) times a day before meals and at bedtime. Take 1 hour prior to meals 120 tablet 1     tiotropium (SPIRIVA) 18 mcg inhalation capsule Place 1 capsule (2 puffs total) into inhaler and inhale daily. 30 capsule 11     witch hazeL (TUCKS, WITCH HAZEL,) 50 % PadM Apply  to the rectal area as needed for pain. 1 each 0     No current facility-administered medications for this visit.      [place] [place]

## 2021-06-11 NOTE — PROGRESS NOTES
ASSESMENT AND PLAN:  Diagnoses and all orders for this visit:    Persistent asthma  -     albuterol (PROAIR HFA;PROVENTIL HFA;VENTOLIN HFA) 90 mcg/actuation inhaler; Inhale 2 puffs every 6 (six) hours as needed for wheezing.  Dispense: 1 Inhaler; Refill: 2  Educated on when to call the clinic and indications to go to the ER.  We will try to avoid frequent ER visit.  Advised to keep appointment with pulmonology next month.    Muscle cramps  -     acetaminophen (PAIN RELIEF EXTRA STRENGTH) 500 MG tablet; TAKE 1 TABLET (500 MG TOTAL) BY MOUTH EVERY 6 (SIX) HOURS AS NEEDED FOR PAIN.  Dispense: 90 tablet; Refill: 1    Blood glucose elevated  -     Glucose  -     Glycosylated Hemoglobin A1c  Last A1c 5.8, 2/18/2016.  Recheck today.    GERD (gastroesophageal reflux disease)  Improving with current meds.  Discussed potential side effects from long-term use of omeprazole.  Discuss alternative medication.  Patient elected to stay on omeprazole for now.      Spent 25 min face to face with patient with more the 50% spent in counseling and discussing problems listed above.      SUBJECTIVE: Kulwant Amin is a 47-year-old female with history of asthma, hypertension, anxiety here for follow-up.   no worsening shortness of breath after last visit a month ago.  No fever since last visit.    She has been taking Tylenol as needed for body ache and muscle cramps.  Needing refill.  No worsening pain since last visit.      She is on omeprazole for reflux.  She has been taking it every day.  She is complaining of heartburn if she skips omeprazole.  No nausea or vomiting.  No blood in the stool.  She does not smoke or drink.      Past Medical History:   Diagnosis Date     Anxiety      Arthritis      Asthma      Back pain      COPD (chronic obstructive pulmonary disease)      Depression      Diabetes mellitus      Generalized headaches      HTN (hypertension)      Pneumonia      Patient Active Problem List   Diagnosis     Hyperglycemia      "HTN (hypertension)     Moderate persistent asthma with acute exacerbation     Insomnia     Pain     Muscle cramps     GERD (gastroesophageal reflux disease)       Allergies:  No Known Allergies    History   Smoking Status     Former Smoker   Smokeless Tobacco     Never Used       Review of systems otherwise negative except as listed in HPI.   History   Smoking Status     Former Smoker   Smokeless Tobacco     Never Used       OBJECTICE: /84 (Patient Site: Right Arm, Patient Position: Sitting, Cuff Size: Adult Regular)  Pulse 76  Temp 98  F (36.7  C) (Oral)   Resp 20  Ht 5' 3\" (1.6 m)  Wt 130 lb (59 kg)  SpO2 98%  BMI 23.03 kg/m2    DATA REVIEWED:    Labs Reviewed or Ordered (1): Glucose, A1c      GEN-alert,  in no apparent distress.  HEENT-mucous membranes are moist, neck is supple.  CV-regular rate and rhythm with no murmur.   RESP- No wheezing today.  ABDOMEN- Soft , not tender.  EXTREM- No edema.  SKIN-no rash/lesions.        Adrien Cardoza   7/19/2017   This transcription uses voice recognition software, which may contain typographical errors.      "

## 2021-06-12 NOTE — TELEPHONE ENCOUNTER
Prior Authorization Request  Who s requesting:  Pharmacy  Pharmacy Name and Location: [facility]  Medication Name: Breo ellipta inhaler 200-25 mcg inhaler  Insurance Plan: [employer]  Insurance Member ID Number:  [id number]  CoverMyMeds Key: [id number]  Informed patient that prior authorizations can take up to 10 business days for response:   No  Okay to leave a detailed message: No         Patinet has been well controlled on this medication for over a year.  Now not covered.

## 2021-06-12 NOTE — TELEPHONE ENCOUNTER
[facility] PA team  [phone]  Pool: HE PA MED ([id number])          PA has been initiated.       PA form completed and faxed insurance via Cover My Meds     Key:  [id number]     Medication:  BREO ELLIPTA    Insurance:  [employer]         Response will be received via fax and may take up to 5-10 business days depending on plan

## 2021-06-12 NOTE — PROGRESS NOTES
ASSESMENT AND PLAN:  Diagnoses and all orders for this visit:    Type 2 diabetes mellitus treated without insulin (H)  -     Glycosylated Hemoglobin A1c  -     Lipid [place]ascade RANDOM  -     Basic Metabolic Panel  Continue current medication.  Recheck A1c today, results pending.    Pure hypercholesterolemia  Tolerating medication well.  Recheck lipid today.    Constipation, unspecified constipation type  Advised to increase fluids and fiber intake daily.  She will continue MiraLAX as needed.    Gastroesophageal reflux disease, unspecified whether esophagitis present  Continue current medications for now.  EGD scheduled.    Other migraine without status migrainosus, not intractable  Continue amitriptyline.  No recent acute headache.    Pain in both lower legs  Discussed increasing gabapentin dose.  Patient elected to stay at 300 mg twice a day.    Moderate persistent asthma without complication  Managed by pulmonology, appointment scheduled next week.    Coronary artery disease due to lipid rich plaque  Managed by cardiology.        This transcription uses voice recognition software, which may contain typographical errors.          SUBJECTIVE: Kulwant Amin is a 50-year-old female with multiple medical conditions seeing multiple specialist here for follow-up.    Diabetes : Daughter-in-law is checking her blood sugar daily.  She states ranges from 90 to low 100s.  No low blood sugar with hypoglycemic symptoms.  Taking metformin 500 mg twice a day.  Her last A1c was 6.3.    Hyperlipidemia: Tolerating medication well.    Constipation: Begin MiraLAX once a day.  Still having problems some days.  No abdominal pain reported.    Epigastric pain: On omeprazole and sucralfate.  Needs omeprazole refilled.  Still complaining of heartburn symptoms.  Scheduled to have upper GI in a few months.    Asthma: Managed by pulmonology.  Daughter-in-law called her pulmonologist's office yesterday due to cough and wheezing.  She was  prescribed oral prednisone.  Follow-up appointment scheduled next week.  No acute wheezing today.    Leg pain: No worsening pain since last visit.  Taking gabapentin 300 mg twice a day.  Uses cane for ambulation.    CAD: Managed by cardiology.  Daughter-in-law states they have appointment in a few weeks but chart review states she was seen in 9/2020 and recommended to follow-up in 1 year.    No known exposure to COVID-19.  No acute fever.  She has cough but is at her baseline.  She has occasional shortness of breath due to her asthma, no worsening shortness of breath in the recent days.    This transcription uses voice recognition software, which may contain typographical errors.        Past Medical History:   Diagnosis Date     Acute asthma exacerbation 1/6/2020     Acute blood loss anemia      Anxiety      Arthritis      Asthma      Asthma exacerbation 11/19/2015     Chest wall pain 12/26/2017    Added automatically from request for surgery [id number]     COPD (chronic obstructive pulmonary disease) (H)      Coronary artery disease due to lipid rich plaque 1/25/2018     Depression      Diabetes mellitus, type II (H)      Essential hypertension 4/14/2017     Generalized headaches      GERD (gastroesophageal reflux disease)      History of anesthesia complications     nausea and vomiting     Lactic acid acidosis 11/23/2018     Lower GI bleeding      Nonspecific chest pain 12/07/2018    2 negative stress tests since coronary stenting in 2018; in June 2020 she had 1 week of this pain with normal cardiac enzymes and relief with Toradol     Pneumonia      SVT (supraventricular tachycardia) (H)      Patient Active Problem List   Diagnosis     GERD (gastroesophageal reflux disease)     Essential hypertension     Coronary artery disease due to lipid rich plaque     Type 2 diabetes mellitus treated without insulin (H)     Moderate persistent asthma     Migraine headache     Hyperlipidemia     Nonspecific chest pain     SOB  "(shortness of breath)     Microcytic anemia     Latent tuberculosis     Chronic nonintractable headache, unspecified headache type     Chronic obstructive pulmonary disease, unspecified COPD type (H)     Bronchiectasis (H)     Cataracts, bilateral     Dental decay     Immigrant with language difficulty     Anxiety     Chest pain     Pneumonia       Allergies:  No Known Allergies    Social History     Tobacco Use   Smoking Status Former Smoker     Types: Cigarettes     Quit date:      Years since quittin.7   Smokeless Tobacco Never Used   Tobacco Comment    stoped 15 yrs ago       Review of systems otherwise negative except as listed in HPI.   Social History     Tobacco Use   Smoking Status Former Smoker     Types: Cigarettes     Quit date:      Years since quittin.7   Smokeless Tobacco Never Used   Tobacco Comment    stoped 15 yrs ago       OBJECTICE: /76 (Patient Site: Left Arm, Patient Position: Sitting, Cuff Size: Adult Regular)   Pulse 90   Temp 98.3  F (36.8  C) (Oral)   Resp 16   Ht 5' 4\" (1.626 m)   Wt 131 lb (59.4 kg)   SpO2 98%   BMI 22.49 kg/m        GEN-alert,  in no apparent distress.  HEENT-mucous membranes are moist, neck is supple.  CV-regular rate and rhythm with no murmur.   RESP-no wheezing on exam today.  ABDOMEN- Soft , not tender.  EXTREM-no joint swelling or tenderness today.  No open lesions or ulcers.  Sensation intact.  SKIN-normal        Alexandria Nani   10/6/2020     "

## 2021-06-12 NOTE — PROGRESS NOTES
Assessment/Plan:    50 y.o.-year-old woman with history of organic fuel exposure in the home; visit was conducted through a [place] .  Her PFTs have been previously noted not to be diagnostic due to poor effort, additionally, she has iron deficiency with significant anemia and is not entirely clear how significantly her anemia is contributing to her shortness of breath or if her metoprolol is contributing to her symptoms.  She continues to be compliant with her inhalers but continues to endorse shortness of breath which improves with initiation of steroids.  Notably, her transthoracic echocardiogram and BNP as recently as December were noted to be unremarkable.  For the present we would recommend;    Continue Breo Ellipta 1 puff daily.  She knows to gargle after using this.    Continues to use Spiriva daily.    Continue to use 3 times daily albuterol nebs.    We will place her on a low-dose prednisone as this appears to alleviate her symptoms more than anything else.    Continue Protonix 40 mg daily.    I have arranged for a 4-week follow-up.    She is up-to-date with her influenza vaccination.    Tamra Scruggsqvi  Pulmonary and Critical Care  [id number]      Subjective:    Patient ID: Kulwant Amin is a 50 y.o. female.    Ms. Amin is a 49 y.o.female with a past medical history significant for anxiety, arthritis, questionable asthma versus COPD, diabetes, hypertension and a prior history of SVT presents with a follow-up visit for her pulmonary complaints.  She was last seen in the pulmonary clinic in mid September as a follow-up after hospitalization related to her left lower lobe pneumonia which had led to an asthma exacerbation.  Since the time of her discharge she has continued to be compliant with her Breo and Spiriva inhalers and uses her albuterol once a day.  She is also continued to be compliant with omeprazole but it is apparent that she is no longer taking her low-dose prednisone as we had  previously discussed.  She has had no change in her shortness of breath.  She denies fevers, chills, night sweats, change in her weight.  In the past 4 weeks, how much of the time did your asthma keep you from getting as much done at work, school, or at home?: (P) All of the time  During the past 4 weeks, how often have you had shortness of breath?: (P) Once a day  During the past 4 weeks, how often did your asthma symptoms (wheezing, coughing, shortness of breath, chest tightness or pain) wake you up at night or earlier in the morning?: (P) 4 or more nights a week  During the past 4 weeks, how often have you used your rescue inhaler or nebulizer medication (such as albuterol)?: (P) 1 or 2 times per day  How would you rate your asthma control during the past 4 weeks?: (P) Not controlled at all  ACT Total Score: (!) (P) 7      Pertinent past medical history:  48-year-old female here for follow-up.  Her breathing is a bit worse than 6 months ago.  She had multiple ER visits and evaluations.  This includes negative PE study.  She had a cath with a 75% LAD lesion which was intervened upon.  Since her catheterization she feels she has more heartburn.  Her breathing is worse with exertion.  Improves with rest.  It is also worse with cold weather.  Warm weather and humid air does not bother.  Symptoms localized to the chest.  Pertinent positives include burning sensation in the chest.  Pertinent negatives include no fever or hemoptysis.    Current Outpatient Medications   Medication Sig Dispense Refill     acetaminophen (TYLENOL) 500 MG tablet Take 2 tablets (1,000 mg total) by mouth every 8 (eight) hours as needed for pain. 90 tablet 10     albuterol (PROVENTIL) 2.5 mg /3 mL (0.083 %) nebulizer solution Take 3 mL (2.5 mg total) by nebulization every 6 (six) hours as needed for wheezing. 150 mL 12     albuterol (VENTOLIN HFA) 90 mcg/actuation inhaler Inhale 2 puffs every 6 (six) hours as needed for wheezing. 1 Inhaler 12      alum/mag hydrox-simethicone-diphenhydramine-lidocaine (MAGIC MOUTHWASH) suspension Swish and swallow 15 mL 4 (four) times a day as needed. 240 mL 2     amitriptyline (ELAVIL) 10 MG tablet TAKE 2 TABLETS (20 MG TOTAL) BY MOUTH AT BEDTIME. 180 tablet 3     aspirin 81 MG EC tablet Take 1 tablet (81 mg total) by mouth daily. 30 tablet 11     atorvastatin (LIPITOR) 80 MG tablet Take 1 tablet (80 mg total) by mouth at bedtime. 90 tablet 3     blood glucose meter (GLUCOMETER) Use 1 each As Directed 3 (three) times a day. Dispense glucometer brand per patient's insurance at pharmacy discretion.(E11.9) 1 each 0     blood glucose test strips Use 1 each As Directed 3 (three) times a day. Dispense brand per patient's insurance at pharmacy discretion.(E11.9) 300 strip 3     clopidogreL (PLAVIX) 75 mg tablet TAKE 1 TABLET (75 MG TOTAL) BY MOUTH DAILY. 90 tablet 1     colchicine 0.6 mg tablet Take 1 tablet (0.6 mg total) by mouth 2 (two) times a day. 180 tablet 0     ferrous sulfate 325 (65 FE) MG tablet Take 1 tablet (325 mg total) by mouth daily with breakfast. 90 tablet 3     fluticasone furoate-vilanteroL (BREO ELLIPTA) 200-25 mcg/dose DsDv inhaler Inhale 1 puff daily. 1 each 12     gabapentin (NEURONTIN) 300 MG capsule Take 1 capsule (300 mg total) by mouth 2 (two) times a day. 60 capsule 5     generic lancets Use 1 each As Directed 3 (three) times a day. Dispense brand per patient's insurance at pharmacy discretion.(E11.9) 300 each 3     guaiFENesin (ROBITUSSIN) 100 mg/5 mL syrup Take 10 mL (200 mg total) by mouth 3 (three) times a day as needed for cough. 200 mL 0     hydroCHLOROthiazide (MICROZIDE) 12.5 mg capsule TAKE 1 CAPSULE (12.5 MG TOTAL) BY MOUTH DAILY FOR BLOOD PRESSURE 90 capsule 2     indomethacin (INDOCIN) 25 MG capsule Take 1 capsule (25 mg total) by mouth 2 (two) times a day with meals. 14 capsule 0     INJECT EASE LANCETS 30 gauge Misc USE 1 EACH AS DIRECTED THREE TIMES A DAY *34 DAYS* 100 each 11      insulin aspart U-100 (NOVOLOG) 100 unit/mL injection Inject under the skin 3 (three) times a day with meals. Use per sliding scale < 150 - No insulin, 151-200 use 3 U, 201- 250 use 5 U,   251- 300 use 8 U        metFORMIN (GLUCOPHAGE) 500 MG tablet TAKE 1 TABLET (500 MG TOTAL) BY MOUTH 2 TIMES A DAY WITH MEALS FOR DIABETES 180 tablet 2     metoprolol tartrate (LOPRESSOR) 25 MG tablet Take 25 mg by mouth 2 (two) times a day.       montelukast (SINGULAIR) 10 mg tablet TAKE 1 PILL (10 MG TOTAL) BY MOUTH AT BEDTIME FOR ASTHMA 30 tablet 10     omeprazole (PRILOSEC) 20 MG capsule Take 2 capsules (40 mg total) by mouth daily before breakfast. 30 capsule 12     ondansetron (ZOFRAN-ODT) 4 MG disintegrating tablet Take 1 tablet (4 mg total) by mouth every 8 (eight) hours as needed for nausea. 12 tablet 0     pediatric multivitamin (POLY-VI-SOL) 1,500- unit-mg-unit/mL Drop drops Take 1 mL by mouth daily. 30 mL 0     polyethylene glycol (GLYCOLAX) 17 gram/dose powder MIX 17 GRAMS WITH LIQUID AND DRINK ONCE DAILY. Hold for loose stools. 510 g 12     predniSONE (DELTASONE) 10 mg tablet Take 4 tabs (40mg total) daily x 5 days,  THEN 1/2 tab (5mg) daily.. 32 tablet 0     sucralfate (CARAFATE) 1 gram tablet Take 1 tablet (1 g total) by mouth 4 (four) times a day before meals and at bedtime. Take 1 hour prior to meals 120 tablet 1     tiotropium (SPIRIVA) 18 mcg inhalation capsule Place 1 capsule (2 puffs total) into inhaler and inhale daily. 30 capsule 11     witch hazeL (TUCKS, WITCH HAZEL,) 50 % PadM Apply  to the rectal area as needed for pain. 1 each 0     No current facility-administered medications for this visit.          Tamra Duong MD  Pulmonary and Critical Care  (p) [phone]

## 2021-06-12 NOTE — TELEPHONE ENCOUNTER
Phone call from patient's daughter.  States that Kulwant is having increased coughing and wheezing.      Called back and spoke with son-in-law.  She has had increased coughing and wheezing past 3 days.  Was on prednisone, but ran out of this about a week ago so has not been taking.      Reviewed with Dr. Duong in clinic and will prescribe prednisone 40mg daily x 5 days, then 5 mg daily until seen by Dr. Duong.

## 2021-06-12 NOTE — PROGRESS NOTES
Assessment/Plan     1. Routine general medical examination at a health care facility  Will make appt for mammogram  - Lipid [place]  - Gynecologic Cytology (PAP Smear)    2. Hypokalemia  K was 3.2 in 6/17.  - Potassium    3. Bronchitis  - azithromycin (ZITHROMAX) 500 MG tablet; Take 1 tablet (500 mg total) by mouth daily for 5 days.  Dispense: 5 tablet; Refill: 0  - predniSONE (DELTASONE) 20 MG tablet; Take 1 tablet (20 mg total) by mouth 2 (two) times a day for 5 days.  Dispense: 10 tablet; Refill: 0    4. Moderate persistent asthma with acute exacerbation  - montelukast (SINGULAIR) 10 mg tablet; TAKE 1 PILL (10 MG TOTAL) BY MOUTH AT BEDTIME FOR ASTHMA  Dispense: 30 tablet; Refill: 2  - mometasone-formoterol (DULERA) 200-5 mcg/actuation HFAA inhaler; INHALE 2 PUFFS BY MOUTH TWO TIMES A DAY  Dispense: 26 g; Refill: 1    5. Muscle cramps  - acetaminophen (PAIN RELIEF EXTRA STRENGTH) 500 MG tablet; TAKE 1 TABLET (500 MG TOTAL) BY MOUTH EVERY 6 (SIX) HOURS AS NEEDED FOR PAIN.  Dispense: 90 tablet; Refill: 1    6. Essential hypertension  Controlled with current med.  - hydroCHLOROthiazide (MICROZIDE) 12.5 mg capsule; Take 1 capsule (12.5 mg total) by mouth daily.  Dispense: 90 capsule; Refill: 1    Subjective:      Kulwant Amin is a 47 y.o. female who presents for an annual exam. The patient is sexually active. The patient participates in regular exercise: yes. The patient reports that there is not domestic violence in her life.   Needs med refill for muscle cramps, HTN and asthma.   c/o cough with worsening wheezing for 2 weeks. No fever.    Healthy Habits:   Regular Exercise: Yes  Sunscreen Use: No  Healthy Diet: Yes  Dental Visits Regularly: No and it's been 2 year i havent seen a dentist   Seat Belt: Yes  Sexually active: Yes  Self Breast Exam Monthly:No  Hemoccults: Yes  Flex Sig: No  Colonoscopy: No  Lipid Profile: Yes  Glucose Screen: Yes  Prevention of Osteoporosis: No  Last Dexa: No  Guns at Home:   No      Immunization History   Administered Date(s) Administered     Hep A, historic 07/11/2012, 05/07/2013     Hep B, historic 04/17/2012, 07/11/2012, 04/03/2013, 05/07/2013     Influenza, inj, historic 10/23/2013     Influenza, seasonal,quad inj 36+ mos 10/19/2016     Influenza,seasonal quad, PF, 36+MOS 11/20/2015     MMR 11/25/2011, 04/17/2012     Pneumo Conj 13-V (2010&after) 04/28/2016     Td, historic 11/25/2011, 05/07/2013     Tdap 04/17/2012     Varicella 04/17/2012, 07/11/2012     Immunization status: up to date and documented, influenza due in Oct 2017.    No exam data present    Gynecologic History  No LMP recorded. Patient is postmenopausal.  Contraception: none  Last Pap: unknown. Results were: N/A  Last mammogram: None Results were: n/a       OB History   No data available       Current Outpatient Prescriptions   Medication Sig Dispense Refill     acetaminophen (PAIN RELIEF EXTRA STRENGTH) 500 MG tablet TAKE 1 TABLET (500 MG TOTAL) BY MOUTH EVERY 6 (SIX) HOURS AS NEEDED FOR PAIN. 90 tablet 1     albuterol (PROAIR HFA;PROVENTIL HFA;VENTOLIN HFA) 90 mcg/actuation inhaler Inhale 2 puffs every 6 (six) hours as needed for wheezing. 1 Inhaler 2     albuterol (PROVENTIL) 2.5 mg /3 mL (0.083 %) nebulizer solution Take 3 mL (2.5 mg total) by nebulization every 6 (six) hours as needed for wheezing. 75 mL 12     amitriptyline (ELAVIL) 10 MG tablet Take 1 tablet (10 mg total) by mouth at bedtime. 30 tablet 5     blood glucose test Strp Use to test blood sugar up to 3 times daily.  Contour Next EZ. 50 strip 1     cimetidine (HEARTBURN RELIEF, CIMETIDINE,) 200 MG tablet Take 2 tablets (400 mg total) by mouth at bedtime. 60 tablet 5     DULERA 200-5 mcg/actuation HFAA inhaler INHALE 2 PUFFS BY MOUTH TWO TIMES A DAY 26 g 1     hydroCHLOROthiazide (MICROZIDE) 12.5 mg capsule Take 1 capsule (12.5 mg total) by mouth daily. 90 capsule 1     lancets (MICROLET LANCET) Misc Use to test blood sugars up to 3 times daily. 100  each 0     montelukast (SINGULAIR) 10 mg tablet TAKE 1 PILL (10 MG TOTAL) BY MOUTH AT BEDTIME FOR ASTHMA 30 tablet 2     omeprazole (PRILOSEC) 20 MG capsule Take 1 capsule (20 mg total) by mouth 2 (two) times a day. 60 capsule 11     tiotropium bromide (SPIRIVA RESPIMAT) 2.5 mcg/actuation Mist Inhale 2 puffs daily. 4 g 4     benzonatate (TESSALON) 100 MG capsule Take 1 capsule (100 mg total) by mouth 3 (three) times a day as needed for cough. 42 capsule 0     codeine-guaiFENesin (GUAIFENESIN AC)  mg/5 mL liquid Take 5 mL by mouth 3 (three) times a day as needed for cough. 120 mL 0     diphenhydrAMINE (BENADRYL) 25 mg tablet Take 1 tablet (25 mg total) by mouth bedtime as needed for sleep. 30 tablet 2     ibuprofen (ADVIL,MOTRIN) 600 MG tablet Take 1 tablet (600 mg total) by mouth every 6 (six) hours as needed for pain. 20 tablet 0     No current facility-administered medications for this visit.      Past Medical History:   Diagnosis Date     Anxiety      Arthritis      Asthma      Back pain      COPD (chronic obstructive pulmonary disease)      Depression      Diabetes mellitus      Generalized headaches      HTN (hypertension)      Pneumonia      No past surgical history on file.  Review of patient's allergies indicates no known allergies.  No family history on file.  Social History     Social History     Marital status:      Spouse name: N/A     Number of children: N/A     Years of education: N/A     Occupational History     Not on file.     Social History Main Topics     Smoking status: Former Smoker     Smokeless tobacco: Never Used     Alcohol use No     Drug use: No     Sexual activity: Not on file     Other Topics Concern     Not on file     Social History Narrative       Review of Systems  Review of Systems    c/o cough with worsening wheezing for 2 weeks. No fever.  No vaginal bleeding.   No CP or palpitations.  No blood in the stool .      Objective:         Vitals:    08/31/17 1129   BP:  "122/80   Pulse: 92   Resp: 20   Temp: 98.2  F (36.8  C)   TempSrc: Oral   SpO2: 99%   Weight: 130 lb (59 kg)   Height: 5' 0.63\" (1.54 m)     Body mass index is 24.86 kg/(m^2).    Physical  Physical Exam      Gen - alert, orientated, NAD  Eyes - fundascopic exam limited by the undialated pupil but looks symmetric  ENT - oropharynx clear, TMs clear  Neck - supple, no palpable mass or lymphadenopathy  CV - RRR, no murmur  Breast - no dominant mass on either side, no axillary mass.  Resp - Some wheezing with no crackles or rhonchi.  Ab - soft, nontender, no palpable mass or organomegaly   - normal appearance to the external genitalia, vaginal mucosa normal, physiologic discharge, no palpable adenexal mass, cervix appears normal  Extrem - warm, no edema  Neuro - CN II-XII intact, strength, sensation, reflexes intact and symmetric  Skin - no rash.  No atypical appearing lesions seen.       "

## 2021-06-12 NOTE — TELEPHONE ENCOUNTER
RN cannot approve Refill Request    RN can NOT refill this medication med is not covered by policy/route to provider. Last office visit: 9/4/2020 Adrien Cardoza MD Last Physical: 9/7/2018 Last MTM visit: 5/21/2019 Malu Muñoz, PharmD Last visit same specialty: 9/16/2020 Emil Haas MD.  Next visit within 3 mo: 10/6/2020 Adrien Cardoza MD  Next physical within 3 mo: Visit date not found      Kellie Lott, [facility] Triage/Med Refill 10/7/2020    Requested Prescriptions   Pending Prescriptions Disp Refills     ROBAFEN 100 mg/5 mL syrup [Pharmacy Med Name: ROBAFEN MUCUS/CHEST CONGEST 200 Liquid] 236 mL 0     Sig: TAKE 10 ML (200 MG TOTAL) BY MOUTH 3 (THREE) TIMES A DAY AS NEEDED FOR COUGH.       There is no refill protocol information for this order

## 2021-06-13 NOTE — PATIENT INSTRUCTIONS - HE
New onset dizziness    History of migraines does state that she has a headache very mild but intractable    New tinnitus    Nausea    Stop Plavix 4 days ago    Differential diagnosis includes  Vascular insufficiency or stroke given she stopped Plavix  History of vascular disease and has a stent    Ménière's disease    Benign positional vertigo    Labyrinthitis    Medication withdrawal    Plan    Meclizine 25 mg 3 times a day  Ondansetron 4 mg as needed for nausea every 8 hours    Hydrate    Proceed to the emergency room if worsening symptoms

## 2021-06-13 NOTE — TELEPHONE ENCOUNTER
Patient notified orders done and that it may not be covered. Faxed to [facility] with notes that patient would like equipment delivered to verified address. Thanks.

## 2021-06-13 NOTE — PROGRESS NOTES
ASSESMENT AND PLAN:  Diagnoses and all orders for this visit:  1. Moderate persistent asthma without complication  No me changes.  Dulera refilled. ( was on Advair , switched per insurance coverage ).  Called pharmacy to deliver Albuterol for NEB.  Instructed to call the clinic with worsening symptoms. Discussed indications to go to the ER.  Will try to schedule frequent office visit to avoid  ER visits this coming months.   RTC in 6 weeks .    2. Blood glucose elevated  A1c 6.4 in 7/17. Recheck today. Not on med , no DM diagnosis.  - Glycosylated Hemoglobin A1c  - Glucose    3. Need for influenza vaccination  - Influenza, Seasonal Quad, Preservative Free 36+ Months      Spent 25 min face to face with patient with more the 50% spent in counseling and coordination of care and discussing problems listed above.      SUBJECTIVE: Kulwant Amin  is a 47-year-old female with history of asthma, hypertension, anxiety here for follow-up.   no worsening shortness of breath after last visit a month ago.  No fever, worsening SOB  since last visit.  All medications are delivered monthly to her home.  She received refill is today but albuterol for nebulizer machine not delivered.  [facility] related ER visit was in 6/17.  Symptoms usually worse in the winter months.    She was on omeprazole for reflux, currently on Zantac. Med is working well.  No nausea or vomiting.  No blood in the stool.  She does not smoke or drink .    Ankle pain is improving.  X-ray was negative for fracture.    Past Medical History:   Diagnosis Date     Anxiety      Arthritis      Asthma      Back pain      COPD (chronic obstructive pulmonary disease)      Depression      Diabetes mellitus      Generalized headaches      HTN (hypertension)      Pneumonia      Patient Active Problem List   Diagnosis     Hyperglycemia     HTN (hypertension)     Moderate persistent asthma with acute exacerbation     Insomnia     Pain     Muscle cramps     GERD  "(gastroesophageal reflux disease)     Blood glucose elevated       Allergies:  No Known Allergies    History   Smoking Status     Former Smoker   Smokeless Tobacco     Never Used       Review of systems otherwise negative except as listed in HPI.   History   Smoking Status     Former Smoker   Smokeless Tobacco     Never Used       OBJECTICE: /76 (Patient Site: Left Arm, Patient Position: Sitting, Cuff Size: Adult Regular)  Pulse 76  Temp 97.8  F (36.6  C) (Oral)   Resp 20  Ht 5' 0.63\" (1.54 m)  Wt 129 lb 4 oz (58.6 kg)  BMI 24.72 kg/m2    DATA REVIEWED:    Labs Reviewed or Ordered (1):       GEN-alert,  in no apparent distress.  HEENT-mucous membranes are moist, neck is supple.  CV-regular rate and rhythm with no murmur.   RESP- No wheezing today.   ABDOMEN- Soft , not tender.  EXTREM- No edema. No joint swelling or tenderness.   SKIN-normal.         Adrien Cardoza   10/4/2017     "

## 2021-06-13 NOTE — TELEPHONE ENCOUNTER
Orders being requested: Bridging orders  Reason service is needed/diagnosis: Pt is having Upper Endoscopy 12/03/2020 with an indication of dysphasia. Provider would like to hold Plavix 7 days prior.   When are orders needed by: ASAP  Where to send Orders: Fax: [phone] and Phone:  [phone]  Okay to leave detailed message?  Yes

## 2021-06-13 NOTE — PROGRESS NOTES
ASSESMENT AND PLAN:  Diagnoses and all orders for this visit:    Moderate persistent asthma with exacerbation  -     predniSONE (DELTASONE) 10 mg tablet; Take 10 mg by mouth daily Take 3 tablets by mouth daily for 2 days, then take 2 tablets for 2 days, then 1 tablet for 2 days, then stop..  Dispense: 12 tablet; Refill: 0  Completed 3 days course of oral prednisone.  We will try to taper dose.  Appointment with pulmonology on 12/21/2020, instructed to keep that appointment.    Dizziness  Improving slowly.    Cough  -     benzonatate (TESSALON PERLES) 100 MG capsule; Take 1 capsule (100 mg total) by mouth 3 (three) times a day as needed for cough.  Dispense: 30 capsule; Refill: 0    Low magnesium level  -     Magnesium    Low blood potassium  Finished potassium supplement from the hospital.  Will recheck, refill only if the low.    Abnormal LFTs  Recheck today.  -     Hepatitis Acute Evaluation    This transcription uses voice recognition software, which may contain typographical errors.        SUBJECTIVE: Kulwant Amin is a 50-year-old female with history of multiple medical conditions including diabetes, hypertension, controlled asthma, hyperlipidemia, CAD S/P stent placement here for ED follow-up.  She had upper GI done on 12/3/2020.  She was seen in the ED on 12/7/2020 due to dizziness, cough and nausea.  EKG was normal.  All labs are unremarkable except glucose 229.  She was found to have slightly low potassium mild elevation of LFTs.  BNP was normal.  Her D-dimer was negative.  MRI brain was unremarkable.  She was diagnosed with asthma exacerbation and was given steroid and neb treatment.  Symptom improvement after the treatment.  She was sent home with several days of prednisone.  Covid test was negative.    She finished her 3 days course of prednisone 2 days ago.  Blood sugar were in the 180s -190s range while she was on prednisone.  Sugar was 152 this morning, 130 yesterday.    Dizziness, cough and shortness  "of breath were improving since the ED visit but still having significant wheezing and trouble breathing.  She uses all her inhalers as prescribed.  She gets cough when she eats\" dry food\".  No fever since the ED visit.    Past Medical History:   Diagnosis Date     Acute asthma exacerbation 1/6/2020     Acute blood loss anemia      Anxiety      Arthritis      Asthma      Asthma exacerbation 11/19/2015     Chest wall pain 12/26/2017    Added automatically from request for surgery [id number]     COPD (chronic obstructive pulmonary disease) (H)      Coronary artery disease due to lipid rich plaque 1/25/2018     Depression      Diabetes mellitus, type II (H)      Essential hypertension 4/14/2017     Generalized headaches      GERD (gastroesophageal reflux disease)      History of anesthesia complications     nausea and vomiting     Lactic acid acidosis 11/23/2018     Lower GI bleeding      Nonspecific chest pain 12/07/2018    2 negative stress tests since coronary stenting in 2018; in June 2020 she had 1 week of this pain with normal cardiac enzymes and relief with Toradol     Pneumonia      SVT (supraventricular tachycardia) (H)      Patient Active Problem List   Diagnosis     GERD (gastroesophageal reflux disease)     Essential hypertension     Coronary artery disease due to lipid rich plaque     Type 2 diabetes mellitus treated without insulin (H)     Moderate persistent asthma     Migraine headache     Hyperlipidemia     Nonspecific chest pain     SOB (shortness of breath)     Microcytic anemia     Latent tuberculosis     Dizziness     Chronic nonintractable headache, unspecified headache type     Chronic obstructive pulmonary disease, unspecified COPD type (H)     Bronchiectasis (H)     Cataracts, bilateral     Dental decay     Immigrant with language difficulty     Anxiety     Chest pain     Pneumonia       Allergies:  No Known Allergies    Social History     Tobacco Use   Smoking Status Former Smoker     Types: " Cigarettes     Quit date:      Years since quittin.9   Smokeless Tobacco Never Used   Tobacco Comment    stoped 15 yrs ago       Review of systems otherwise negative except as listed in HPI.   Social History     Tobacco Use   Smoking Status Former Smoker     Types: Cigarettes     Quit date:      Years since quittin.9   Smokeless Tobacco Never Used   Tobacco Comment    stoped 15 yrs ago       OBJECTICE: There were no vitals taken for this visit.    DATA REVIEWED:  Additional History from Old Records Summarized (2):  Radiology Tests Summarized or Ordered (1):   Labs Reviewed or Ordered (1):       GEN-alert,  in no apparent distress.  HEENT-mucous membranes are moist, neck is supple.  CV-regular rate and rhythm with no murmur.   RESP-wheezing both lung fields.  No crackles or rhonchi.  No retractions.  ABDOMEN- Soft , not tender.  EXTREM- No edema.  SKIN-normal        [facility]   2020

## 2021-06-13 NOTE — TELEPHONE ENCOUNTER
Central PA team  [phone]  Pool: HE PA MED ([id number])          PA has been initiated.       PA form completed and faxed insurance via Cover My Meds     Key:  [id number]     Medication:  INCRUSE ELLIPTA    Insurance:  [employer]         Response will be received via fax and may take up to 5-10 business days depending on plan

## 2021-06-13 NOTE — PROGRESS NOTES
Assessment/Plan:    50 y.o.-year-old woman with history of organic fuel exposure in the home; visit was conducted through a [place] .  Her PFTs have been previously noted not to be diagnostic due to poor effort, additionally, she has iron deficiency with significant anemia and is not entirely clear how significantly her anemia is contributing to her shortness of breath.  She continues to be compliant with her inhalers but continues to endorse shortness of breath which improves with initiation of steroids.  Notably, her transthoracic echocardiogram and BNP as recently as December 2019 were noted to be unremarkable.  For the present we would recommend;    Continue Breo Ellipta 1 puff daily.  She knows to gargle after using this.    Switch from Spiriva to Incruse Ellipta.    Continue to use 3 times daily albuterol nebs.    Have increased her baseline prednisone maintenance dose from 5 mg a day to 7-1/2 mg a day to see if this will alleviate her symptoms    Continue Protonix 40 mg daily.    She is up-to-date with her influenza vaccination.    Follow-up in 8 weeks.  I reminded her daughter-in-law again to call me if she is not feeling well.    Tamra Scruggsqvi  Pulmonary and Critical Care  [id number]      Subjective:    Patient ID: Kulwant Amin is a 50 y.o. female.    Ms. Amin is a 49 y.o.female with a past medical history significant for anxiety, arthritis, questionable asthma versus COPD, diabetes, hypertension and a prior history of SVT presents with a follow-up visit for her pulmonary complaints.  She follows with me fairly closely for her moderate-persistent, difficult to control asthma symptoms.  She was last seen by me at the end of October at which point in time we had initiated her on a low-dose prednisone to see if this would alleviate her symptoms.  We have previously noted that she is very compliant with her inhaled bronchodilator regimen and likely just has very hard to control asthma.  Unfortunately  about a week and a half ago she began to experience worsening shortness of breath with wheezing was evaluated at the primary care clinic where she was initiated on a prednisone taper and states that she is feeling much better since then.  She continues to endorse a cough but denies fevers, chills, night sweats or change in her weight.  In the past 4 weeks, how much of the time did your asthma keep you from getting as much done at work, school, or at home?: Most of the time  During the past 4 weeks, how often have you had shortness of breath?: More than once a day  During the past 4 weeks, how often did your asthma symptoms (wheezing, coughing, shortness of breath, chest tightness or pain) wake you up at night or earlier in the morning?: 4 or more nights a week  During the past 4 weeks, how often have you used your rescue inhaler or nebulizer medication (such as albuterol)?: 3 or more times per day  How would you rate your asthma control during the past 4 weeks?: Not controlled at all  ACT Total Score: (!) 6  In the past 12 months, have you visited the emergency room due to your asthma?: Yes  Number of Emergency Room visits in the past 12 months due to asthma: 2  In the past 12 months, have you been hospitalized due to your asthma?: Yes  Number of Hospitalizations due to asthma in the last 12 months: 5      Pertinent past medical history:  48-year-old female here for follow-up.  Her breathing is a bit worse than 6 months ago.  She had multiple ER visits and evaluations.  This includes negative PE study.  She had a cath with a 75% LAD lesion which was intervened upon.  Since her catheterization she feels she has more heartburn.  Her breathing is worse with exertion.  Improves with rest.  It is also worse with cold weather.  Warm weather and humid air does not bother.  Symptoms localized to the chest.  Pertinent positives include burning sensation in the chest.  Pertinent negatives include no fever or  hemoptysis.    Current Outpatient Medications   Medication Sig Dispense Refill     acetaminophen (TYLENOL) 500 MG tablet Take 2 tablets (1,000 mg total) by mouth every 8 (eight) hours as needed for pain. 90 tablet 10     albuterol (PROVENTIL) 2.5 mg /3 mL (0.083 %) nebulizer solution Take 3 mL (2.5 mg total) by nebulization every 6 (six) hours as needed for wheezing. 150 mL 12     albuterol (PROVENTIL) 5 mg/mL nebulizer solution Take 0.5 mL (2.5 mg total) by nebulization every 6 (six) hours as needed for wheezing. 20 mL 0     albuterol (VENTOLIN HFA) 90 mcg/actuation inhaler Inhale 2 puffs every 6 (six) hours as needed for wheezing. 1 Inhaler 12     amitriptyline (ELAVIL) 10 MG tablet TAKE 2 TABLETS (20 MG TOTAL) BY MOUTH AT BEDTIME. 180 tablet 3     aspirin 81 MG EC tablet Take 1 tablet (81 mg total) by mouth daily. 30 tablet 11     atorvastatin (LIPITOR) 80 MG tablet Take 1 tablet (80 mg total) by mouth at bedtime. 90 tablet 3     benzonatate (TESSALON PERLES) 100 MG capsule Take 1 capsule (100 mg total) by mouth 3 (three) times a day as needed for cough. 30 capsule 0     clopidogreL (PLAVIX) 75 mg tablet TAKE 1 TABLET (75 MG TOTAL) BY MOUTH DAILY. 90 tablet 1     colchicine 0.6 mg tablet Take 1 tablet (0.6 mg total) by mouth 2 (two) times a day. 180 tablet 0     ferrous sulfate 325 (65 FE) MG tablet Take 1 tablet (325 mg total) by mouth daily with breakfast. 90 tablet 3     fluticasone furoate-vilanteroL (BREO ELLIPTA) 200-25 mcg/dose DsDv inhaler Inhale 1 puff daily. 1 each 12     gabapentin (NEURONTIN) 300 MG capsule TAKE 1 CAPSULE (300 MG TOTAL) BY MOUTH 2 (TWO) TIMES A DAY FOR NERVE PAIN 180 capsule 3     guaiFENesin (ROBITUSSIN) 100 mg/5 mL syrup Take 10 mL (200 mg total) by mouth 3 (three) times a day as needed for cough. 200 mL 0     hydroCHLOROthiazide (MICROZIDE) 12.5 mg capsule TAKE 1 CAPSULE (12.5 MG TOTAL) BY MOUTH DAILY FOR BLOOD PRESSURE 90 capsule 2     indomethacin (INDOCIN) 25 MG capsule Take 1  capsule (25 mg total) by mouth 2 (two) times a day with meals. 14 capsule 0     insulin aspart U-100 (NOVOLOG) 100 unit/mL injection Inject under the skin 3 (three) times a day with meals. Use per sliding scale < 150 - No insulin, 151-200 use 3 U, 201- 250 use 5 U,   251- 300 use 8 U        meclizine (ANTIVERT) 25 mg tablet Take 1 tablet (25 mg total) by mouth 3 (three) times a day. 21 tablet 1     metFORMIN (GLUCOPHAGE) 500 MG tablet TAKE 1 TABLET (500 MG TOTAL) BY MOUTH 2 TIMES A DAY WITH MEALS FOR DIABETES 60 tablet 2     metoprolol tartrate (LOPRESSOR) 25 MG tablet Take 25 mg by mouth 2 (two) times a day.       montelukast (SINGULAIR) 10 mg tablet TAKE 1 PILL (10 MG TOTAL) BY MOUTH AT BEDTIME FOR ASTHMA 30 tablet 10     omeprazole (PRILOSEC) 20 MG capsule Take 2 capsules (40 mg total) by mouth daily before breakfast. 30 capsule 12     ondansetron (ZOFRAN-ODT) 4 MG disintegrating tablet Take 1 tablet (4 mg total) by mouth every 8 (eight) hours as needed for nausea. 30 tablet 0     pediatric multivitamin (POLY-VI-SOL) 1,500- unit-mg-unit/mL Drop drops Take 1 mL by mouth daily. 30 mL 0     polyethylene glycol (GLYCOLAX) 17 gram/dose powder MIX 17 GRAMS WITH LIQUID AND DRINK ONCE DAILY. Hold for loose stools. 510 g 12     predniSONE (DELTASONE) 10 mg tablet Take 10 mg by mouth daily Take 3 tablets by mouth daily for 2 days, then take 2 tablets for 2 days, then 1 tablet for 2 days, then stop.. 12 tablet 0     sucralfate (CARAFATE) 1 gram tablet Take 1 tablet (1 g total) by mouth 4 (four) times a day before meals and at bedtime. Take 1 hour prior to meals 120 tablet 1     tiotropium (SPIRIVA) 18 mcg inhalation capsule Place 1 capsule (2 puffs total) into inhaler and inhale daily. 30 capsule 11     witch hazeL (TUCKS, WITCH HAZEL,) 50 % PadM Apply  to the rectal area as needed for pain. 1 each 0     alum/mag hydrox-simethicone-diphenhydramine-lidocaine (MAGIC MOUTHWASH) suspension Swish and swallow 15 mL 4 (four)  times a day as needed. 240 mL 2     blood glucose meter (GLUCOMETER) Use 1 each As Directed 3 (three) times a day. Dispense glucometer brand per patient's insurance at pharmacy discretion.(E11.9) 1 each 0     blood glucose test strips Use 1 each As Directed 3 (three) times a day. Dispense brand per patient's insurance at pharmacy discretion.(E11.9) 300 strip 3     generic lancets Use 1 each As Directed 3 (three) times a day. Dispense brand per patient's insurance at pharmacy discretion.(E11.9) 300 each 3     INJECT EASE LANCETS 30 gauge Misc USE 1 EACH AS DIRECTED THREE TIMES A DAY *34 DAYS* 100 each 11     No current facility-administered medications for this visit.        /72   Pulse 99   Wt 134 lb 12.8 oz (61.1 kg)   SpO2 97% Comment: RA  BMI 23.14 kg/m      Physical Exam   Constitutional: She is oriented to person, place, and time. She appears well-developed and well-nourished.   HENT:   Head: Normocephalic.   Eyes: Pupils are equal, round, and reactive to light.   Neck: Normal range of motion.   Cardiovascular:   Sinus Tachycardia.  S1 and S2 audible.   Pulmonary/Chest: Effort normal and breath sounds normal.   No wheezing appreciated.   Abdominal: Soft.   Musculoskeletal: Normal range of motion.         General: No edema.   Neurological: She is alert and oriented to person, place, and time.   Skin: Skin is warm.   Psychiatric: She has a normal mood and affect.         Tamra Duong MD  Pulmonary and Critical Care  (p) [phone]

## 2021-06-13 NOTE — TELEPHONE ENCOUNTER
Refill Approved    Rx renewed per Medication Renewal Policy. Medication was last renewed on 6/224/20.    Kellie Lott, [facility] Triage/Med Refill 12/7/2020     Requested Prescriptions   Pending Prescriptions Disp Refills     gabapentin (NEURONTIN) 300 MG capsule [Pharmacy Med Name: GABAPENTIN 300 MG CAPSULE 300 Capsule] 60 capsule 5     Sig: TAKE 1 CAPSULE (300 MG TOTAL) BY MOUTH 2 (TWO) TIMES A DAY FOR NERVE PAIN       Gabapentin/Levetiracetam/Tiagabine Refill Protocol  Passed - 12/6/2020  1:55 PM        Passed - PCP or prescribing provider visit in past 12 months or next 3 months     Last office visit with prescriber/PCP: 12/1/2020 Adrien Cardoza MD OR same dept: 12/1/2020 Adrien Cardoza MD OR same specialty: 12/1/2020 Adrien Cardoza MD  Last physical: 9/7/2018 Last MTM visit: 5/21/2019 Malu Muñoz, PharmD   Next visit within 3 mo: Visit date not found  Next physical within 3 mo: Visit date not found  Prescriber OR PCP: Adrien Cardoza MD  Last diagnosis associated with med order: 1. Pain in both lower legs  - gabapentin (NEURONTIN) 300 MG capsule [Pharmacy Med Name: GABAPENTIN 300 MG CAPSULE 300 Capsule]; TAKE 1 CAPSULE (300 MG TOTAL) BY MOUTH 2 (TWO) TIMES A DAY FOR NERVE PAIN  Dispense: 60 capsule; Refill: 5    If protocol passes may refill for 12 months if within 3 months of last provider visit (or a total of 15 months).

## 2021-06-13 NOTE — PATIENT INSTRUCTIONS - HE
1. Please start using Prednisone 7.5mg every day (3 tablets) since this is higher than your previous dose but lower than the one you were just on.  2. I will discontinue your Spiriva and switch you to Incruse Ellipta instead.  3. Continue the Breo inhaler 1 puff daily, as you are and remember to rinse your mouth after using this.

## 2021-06-13 NOTE — PROGRESS NOTES
ASSESMENT AND PLAN:  Diagnoses and all orders for this visit:    Dizziness  -     Comprehensive Metabolic Panel  Daughter-in-law will  her meclizine and Zofran from the pharmacy today.  It was prescribed yesterday.    Anemia, unspecified type  -     HM1(CBC and Differential)  Check CBC today.    Type 2 diabetes mellitus treated without insulin (H)  Last A1c 7.5 in 10/2020.  No medication changes today.    Moderate persistent asthma without complication  Managed by pulmonology.  Upcoming appointment on 12/21/2020, instructed to keep that appointment.  Patient and daughter-in-law voiced understanding.    This transcription uses voice recognition software, which may contain typographical errors.      SUBJECTIVE: Kulwant Amin is a 50-year-old female with multiple medical conditions including but not limited to diabetes, hypertension, uncontrolled asthma, coronary artery disease here to follow-up on dizziness.  She is here with her daughter-in-law.  She developed dizziness 3 days ago.  States that she cannot walk, was having spinning room sensation.  She has some nausea with the dizziness but no vomiting.  She had virtual visit with Dr. Rincon yesterday, note reviewed.  MRI of the brain was ordered and she was prescribed meclizine and Zofran.  Daughter-in-law states they have not picked up the prescribed medications yet.  They went for MRI yesterday, was canceled due to  issues and it was rescheduled on 12/15/2020.  The dizziness is improving compared to 3 days ago.  No fever.  No worsening shortness of breath.  She had Covid test yesterday for preprocedure and was negative.    No other new problems or concerns.  No known exposure to COVID-19.    Past Medical History:   Diagnosis Date     Acute asthma exacerbation 1/6/2020     Acute blood loss anemia      Anxiety      Arthritis      Asthma      Asthma exacerbation 11/19/2015     Chest wall pain 12/26/2017    Added automatically from request for surgery  [id number]     COPD (chronic obstructive pulmonary disease) (H)      Coronary artery disease due to lipid rich plaque 2018     Depression      Diabetes mellitus, type II (H)      Essential hypertension 2017     Generalized headaches      GERD (gastroesophageal reflux disease)      History of anesthesia complications     nausea and vomiting     Lactic acid acidosis 2018     Lower GI bleeding      Nonspecific chest pain 2018    2 negative stress tests since coronary stenting in ; in 2020 she had 1 week of this pain with normal cardiac enzymes and relief with Toradol     Pneumonia      SVT (supraventricular tachycardia) (H)      Patient Active Problem List   Diagnosis     GERD (gastroesophageal reflux disease)     Essential hypertension     Coronary artery disease due to lipid rich plaque     Type 2 diabetes mellitus treated without insulin (H)     Moderate persistent asthma     Migraine headache     Hyperlipidemia     Nonspecific chest pain     SOB (shortness of breath)     Microcytic anemia     Latent tuberculosis     Dizziness     Chronic nonintractable headache, unspecified headache type     Chronic obstructive pulmonary disease, unspecified COPD type (H)     Bronchiectasis (H)     Cataracts, bilateral     Dental decay     Immigrant with language difficulty     Anxiety     Chest pain     Pneumonia       Allergies:  No Known Allergies    Social History     Tobacco Use   Smoking Status Former Smoker     Types: Cigarettes     Quit date:      Years since quittin.9   Smokeless Tobacco Never Used   Tobacco Comment    stoped 15 yrs ago       Review of systems otherwise negative except as listed in HPI.   Social History     Tobacco Use   Smoking Status Former Smoker     Types: Cigarettes     Quit date:      Years since quittin.9   Smokeless Tobacco Never Used   Tobacco Comment    stoped 15 yrs ago       OBJECTICE: /66 (Patient Site: Right Arm, Patient Position: Sitting,  "Cuff Size: Adult Regular)   Pulse 96   Temp 98.3  F (36.8  C) (Oral)   Resp 20   Ht 5' 3.86\" (1.622 m)   Wt 132 lb 8 oz (60.1 kg)   SpO2 95%   BMI 22.84 kg/m      DATA REVIEWED:    Labs Reviewed or Ordered (1):       GEN-alert,  in no apparent distress.  HEENT-mucous membranes are moist, neck is supple.  CV-regular rate and rhythm with no murmur.   RESP-coarse breath sounds bilaterally with mild wheezing.  No crackles or rhonchi.  ABDOMEN- Soft , not tender.  NEURO- No focal findings.  SKIN-normal        [facility]   12/1/2020     "

## 2021-06-13 NOTE — PROGRESS NOTES
"    TCM DISCHARGE FOLLOW UP CALL      \"Hi, my name is  Sean, and I am calling from [facility].  I am calling to follow up and see how things are going for you after your recent hospitalization.      Tell me how you are doing now that you are home?\" Still experiencing dizziness, but felling better.      Discharge Instructions     Do you understand your discharge instructions?  Pt. Response: Yes      \"Has an appointment with your primary care provider been scheduled?\" Yes  \"If yes, when is that appointment?  12/11/20      Medications    \"Did you have any medication changes?   Yes   Do you have any concerns with obtaining the medications or questions about your medications that you would like a RN to review with you?  No  **If yes, escalate to CC RN responsible for patient's clinic or [facility] RN  Send an inbasket Epic message if RN is unavailable after call.     \"When you see the provider, I would recommend that you bring your medications with you.\"    Call Summary    \"What questions or concerns do you have about your recent visit and your follow-up care?\"No questions or concerns at this time.             Can be addressed if scheduled with RN or SW either Care Coordination or if declining to triage for further questions.         \"If you have questions or things don't continue to improve, we encourage you contact us through the main clinic number.  Even if the clinic is not open, triage nurses are available 24/7 to help you.         I am with Clinic Care Coordination, and we are a team of nurses, social workers, community health workers, and financial resource workers. We work together with your primary doctor.   We are here to see if we can help you with a variety of things - from resources in the community such as food assistance, transportation, help in the home, equipment needs, assistance with medications, coordination of your appointments, help with any medical questions, and things like this.      We would " have a nurse or  speak with you and together come up with goals to help you to improve your health and wellness and do the best to help you stay out of the hospital.

## 2021-06-13 NOTE — TELEPHONE ENCOUNTER
Orders being requested: Requesting hold and bridge orders for Plavix for 7 days prior to the patients procedure that is scheduled on 12/3/20.  Reason service is needed/diagnosis: For a procedure that is scheduled for 12/3/20  When are orders needed by: ASAP  Where to send Orders: Fax: [phone] and Phone:  [phone]  Okay to leave detailed message?  Yes

## 2021-06-13 NOTE — PROGRESS NOTES
"  Dizziness  Started Yesterday 11/29  Worse today     New issue    Like \"like whole body spinning\"    Plavix for blood thinner  Stopped for procedure on 11/26  Stent put in 6/2018 on Aspirin     Worse:  Precipitates Constant ; If she turns head: like the same no matter what    Better: Nothing  Migraine: history of Migraine but no part of migraine  Has Asthma usually cough; No covid   + Nausea  + Sweaty (but usual)  Fever? No   Taste OK ? Yes   Smell OK ? Yes   Hearing OK   Muffled sensation YEs sometimes  Ringing? Yes both sides     Dizziness  Constant   Spinning Whole     Vertigo   Meniere  Labyrinthitis  TIA / CVA       Meclizine 25 mg Three times   Ondansetron 4 mg Q8 hours      MRI   No exposure       Kulwant Amin is a 50 y.o. female who is being evaluated via a billable telephone visit.      The patient has been notified of following:     \"This telephone visit will be conducted via a call between you and your physician/provider. We have found that certain health care needs can be provided without the need for a physical exam.  This service lets us provide the care you need with a short phone conversation.  If a prescription is necessary we can send it directly to your pharmacy.  If lab work is needed we can place an order for that and you can then stop by our lab to have the test done at a later time.    If during the course of the call the physician/provider feels a telephone visit is not appropriate, you will not be charged for this service.\"     Physician has received verbal consent for a Telephone Visit from the patient? Yes    Kulwant Amin complains of  No chief complaint on file.      I have reviewed and updated the patient's Past Medical History, Social History, Family History and Medication List.    ALLERGIES  Patient has no known allergies.    Additional provider notes: insert own note template here   See   Kulwant Joiner     Problem List Items Addressed This Visit     Dizziness - Primary     " "Started Yesterday 11/29  Worse today     New issue    Like \"like whole body spinning\"    Plavix for blood thinner  Stopped for procedure on 11/26  Stent put in 6/2018 on Aspirin     Worse:  Precipitates Constant ; If she turns head: like the same no matter what    Better: Nothing  Migraine: history of Migraine but no part of migraine  Has Asthma usually cough; No covid   + Nausea  + Sweaty (but usual)  Fever? No   Taste OK ? Yes   Smell OK ? Yes   Hearing OK   Muffled sensation YEs sometimes  Ringing? Yes both sides     Dizziness  Constant   Spinning Whole     Vertigo   Meniere  Labyrinthitis  TIA / CVA       Meclizine 25 mg Three times   Ondansetron 4 mg Q8 hours      MRI   No exposure          Relevant Medications    meclizine (ANTIVERT) 25 mg tablet    ondansetron (ZOFRAN-ODT) 4 MG disintegrating tablet    Other Relevant Orders    MR Brain With Without Contrast      Other Visit Diagnoses     Tinnitus, bilateral        Relevant Orders    MR Brain With Without Contrast    Intractable headache, unspecified chronicity pattern, unspecified headache type        Relevant Orders    MR Brain With Without Contrast    Nausea        Relevant Medications    ondansetron (ZOFRAN-ODT) 4 MG disintegrating tablet          Assessment/Plan:  1. Dizziness    - MR Brain With Without Contrast; Future  - meclizine (ANTIVERT) 25 mg tablet; Take 1 tablet (25 mg total) by mouth 3 (three) times a day.  Dispense: 21 tablet; Refill: 1  - ondansetron (ZOFRAN-ODT) 4 MG disintegrating tablet; Take 1 tablet (4 mg total) by mouth every 8 (eight) hours as needed for nausea.  Dispense: 30 tablet; Refill: 0    2. Tinnitus, bilateral    - MR Brain With Without Contrast; Future    3. Intractable headache, unspecified chronicity pattern, unspecified headache type    - MR Brain With Without Contrast; Future    4. Nausea    - ondansetron (ZOFRAN-ODT) 4 MG disintegrating tablet; Take 1 tablet (4 mg total) by mouth every 8 (eight) hours as needed for " nausea.  Dispense: 30 tablet; Refill: 0        Phone call duration: 21 minutes    Sunny Rincon MD

## 2021-06-13 NOTE — TELEPHONE ENCOUNTER
Prior Authorization Request  Who s requesting:  Pharmacy  Pharmacy Name and Location: [facility]  Medication Name: Incruse ellipta inhaler  Insurance Plan: [employer]  Insurance Member ID Number:  [id number]  CoverMyMeds Key: N/A  Informed patient that prior authorizations can take up to 10 business days for response:   No  Okay to leave a detailed message: No         Patient has been taking this medication since July 2019 and has been well controlled.  Has tried and failed Spiriva

## 2021-06-13 NOTE — TELEPHONE ENCOUNTER
Orders being requested: Adjustable mattress and blood pressure cuff  Reason service is needed/diagnosis: Current one is 8 years old. Asthma, COPD, hypotension, fluids in lungs, elevated blood pressure readings.  When are orders needed by: as soon as possible   Where to send Orders: [facility]   Okay to leave detailed message?  Yes  [phone]    FYI: caller stated she would like a call back whether this gets approved from Adrien Cardoza MD or not.

## 2021-06-13 NOTE — PROGRESS NOTES
ASSESMENT AND PLAN:  Diagnoses and all orders for this visit:    Myalgia  Aggressive oral hydration, rest, acetaminophen every 4-6 hours as needed while we await test results.  -     Symptomatic COVID-19 Virus (CORONAVIRUS) PCR    Cough  -     Symptomatic COVID-19 Virus (CORONAVIRUS) PCR    Moderate persistent asthma without complication, history of bronchiectasis without complication (H)  Certainly this puts her at high risk for more severe sequelae from COVID-19 infection.  Patient and daughter in law counseled on indications for emergent follow-up with the help of a professional .          Reviewed the risks and benefits of the treatment plan with the patient and/or caregiver and we discussed indications for routine and emergent follow-up.        SUBJECTIVE: 50-year-old female with a 3 to 4-day history of mild cough, mild to moderate muscle aches in her body.  She feels an aching sensation in her legs, back, shoulders.  No loss of sense of taste or smell.  No fevers.  No chills.  Mild cough.  No shortness of breath.  She does have serious history of lung disease but does not feel like it has been an exacerbation recently and has been using her inhaled therapy as directed and her usual medications as directed.    No dysuria or gross hematuria.  No vomiting.      Past Medical History:   Diagnosis Date     Acute asthma exacerbation 1/6/2020     Acute blood loss anemia      Anxiety      Arthritis      Asthma      Asthma exacerbation 11/19/2015     Chest wall pain 12/26/2017    Added automatically from request for surgery [id number]     COPD (chronic obstructive pulmonary disease) (H)      Coronary artery disease due to lipid rich plaque 1/25/2018     Depression      Diabetes mellitus, type II (H)      Essential hypertension 4/14/2017     Generalized headaches      GERD (gastroesophageal reflux disease)      History of anesthesia complications     nausea and vomiting     Lactic acid acidosis 11/23/2018      Lower GI bleeding      Nonspecific chest pain 12/07/2018    2 negative stress tests since coronary stenting in 2018; in June 2020 she had 1 week of this pain with normal cardiac enzymes and relief with Toradol     Pneumonia      SVT (supraventricular tachycardia) (H)      Patient Active Problem List   Diagnosis     GERD (gastroesophageal reflux disease)     Essential hypertension     Coronary artery disease due to lipid rich plaque     Type 2 diabetes mellitus treated without insulin (H)     Moderate persistent asthma     Migraine headache     Hyperlipidemia     Nonspecific chest pain     SOB (shortness of breath)     Microcytic anemia     Latent tuberculosis     Chronic nonintractable headache, unspecified headache type     Chronic obstructive pulmonary disease, unspecified COPD type (H)     Bronchiectasis (H)     Cataracts, bilateral     Dental decay     Immigrant with language difficulty     Anxiety     Chest pain     Pneumonia     Current Outpatient Medications   Medication Sig Dispense Refill     acetaminophen (TYLENOL) 500 MG tablet Take 2 tablets (1,000 mg total) by mouth every 8 (eight) hours as needed for pain. 90 tablet 10     albuterol (PROVENTIL) 2.5 mg /3 mL (0.083 %) nebulizer solution Take 3 mL (2.5 mg total) by nebulization every 6 (six) hours as needed for wheezing. 150 mL 12     albuterol (VENTOLIN HFA) 90 mcg/actuation inhaler Inhale 2 puffs every 6 (six) hours as needed for wheezing. 1 Inhaler 12     amitriptyline (ELAVIL) 10 MG tablet TAKE 2 TABLETS (20 MG TOTAL) BY MOUTH AT BEDTIME. 180 tablet 3     aspirin 81 MG EC tablet Take 1 tablet (81 mg total) by mouth daily. 30 tablet 11     atorvastatin (LIPITOR) 80 MG tablet Take 1 tablet (80 mg total) by mouth at bedtime. 90 tablet 3     blood glucose meter (GLUCOMETER) Use 1 each As Directed 3 (three) times a day. Dispense glucometer brand per patient's insurance at pharmacy discretion.(E11.9) 1 each 0     blood glucose test strips Use 1 each As  Directed 3 (three) times a day. Dispense brand per patient's insurance at pharmacy discretion.(E11.9) 300 strip 3     clopidogreL (PLAVIX) 75 mg tablet TAKE 1 TABLET (75 MG TOTAL) BY MOUTH DAILY. 90 tablet 1     colchicine 0.6 mg tablet Take 1 tablet (0.6 mg total) by mouth 2 (two) times a day. 180 tablet 0     ferrous sulfate 325 (65 FE) MG tablet Take 1 tablet (325 mg total) by mouth daily with breakfast. 90 tablet 3     fluticasone furoate-vilanteroL (BREO ELLIPTA) 200-25 mcg/dose DsDv inhaler Inhale 1 puff daily. 1 each 12     gabapentin (NEURONTIN) 300 MG capsule Take 1 capsule (300 mg total) by mouth 2 (two) times a day. 60 capsule 5     generic lancets Use 1 each As Directed 3 (three) times a day. Dispense brand per patient's insurance at pharmacy discretion.(E11.9) 300 each 3     hydroCHLOROthiazide (MICROZIDE) 12.5 mg capsule TAKE 1 CAPSULE (12.5 MG TOTAL) BY MOUTH DAILY FOR BLOOD PRESSURE 90 capsule 2     indomethacin (INDOCIN) 25 MG capsule Take 1 capsule (25 mg total) by mouth 2 (two) times a day with meals. 14 capsule 0     INJECT EASE LANCETS 30 gauge Misc USE 1 EACH AS DIRECTED THREE TIMES A DAY *34 DAYS* 100 each 11     insulin aspart U-100 (NOVOLOG) 100 unit/mL injection Inject under the skin 3 (three) times a day with meals. Use per sliding scale < 150 - No insulin, 151-200 use 3 U, 201- 250 use 5 U,   251- 300 use 8 U        metFORMIN (GLUCOPHAGE) 500 MG tablet TAKE 1 TABLET (500 MG TOTAL) BY MOUTH 2 TIMES A DAY WITH MEALS FOR DIABETES 60 tablet 2     metoprolol tartrate (LOPRESSOR) 25 MG tablet Take 25 mg by mouth 2 (two) times a day.       montelukast (SINGULAIR) 10 mg tablet TAKE 1 PILL (10 MG TOTAL) BY MOUTH AT BEDTIME FOR ASTHMA 30 tablet 10     omeprazole (PRILOSEC) 20 MG capsule Take 2 capsules (40 mg total) by mouth daily before breakfast. 30 capsule 12     polyethylene glycol (GLYCOLAX) 17 gram/dose powder MIX 17 GRAMS WITH LIQUID AND DRINK ONCE DAILY. Hold for loose stools. 510 g 12      "sucralfate (CARAFATE) 1 gram tablet Take 1 tablet (1 g total) by mouth 4 (four) times a day before meals and at bedtime. Take 1 hour prior to meals 120 tablet 1     tiotropium (SPIRIVA) 18 mcg inhalation capsule Place 1 capsule (2 puffs total) into inhaler and inhale daily. 30 capsule 11     alum/mag hydrox-simethicone-diphenhydramine-lidocaine (MAGIC MOUTHWASH) suspension Swish and swallow 15 mL 4 (four) times a day as needed. 240 mL 2     guaiFENesin (ROBITUSSIN) 100 mg/5 mL syrup Take 10 mL (200 mg total) by mouth 3 (three) times a day as needed for cough. 200 mL 0     ondansetron (ZOFRAN-ODT) 4 MG disintegrating tablet Take 1 tablet (4 mg total) by mouth every 8 (eight) hours as needed for nausea. 12 tablet 0     pediatric multivitamin (POLY-VI-SOL) 1,500- unit-mg-unit/mL Drop drops Take 1 mL by mouth daily. 30 mL 0     predniSONE (DELTASONE) 10 mg tablet Take 4 tabs (40mg total) daily x 5 days,  THEN 1/2 tab (5mg) daily.. 32 tablet 0     predniSONE (DELTASONE) 5 MG tablet Take 5 mg by mouth daily. 30 tablet 6     witch hazeL (TUCKS, WITCH HAZEL,) 50 % PadM Apply  to the rectal area as needed for pain. 1 each 0     No current facility-administered medications for this visit.      Social History     Tobacco Use   Smoking Status Former Smoker     Types: Cigarettes     Quit date:      Years since quittin.9   Smokeless Tobacco Never Used   Tobacco Comment    stoped 15 yrs ago       OBJECTICE: /60   Pulse 94   Temp 98.5  F (36.9  C) (Oral)   Resp 16   Ht 5' 3.86\" (1.622 m)   Wt 132 lb 1.9 oz (59.9 kg)   SpO2 95%   BMI 22.78 kg/m       No results found for this or any previous visit (from the past 24 hour(s)).     GEN-alert, appropriate, in no apparent distress   HEENT-mucous membranes are moist, neck is supple, oropharynx shows dental staining but otherwise appears clear   CV-regular rate and rhythm with no murmur   RESP-lungs clear to auscultation     Chintan Alegria          "

## 2021-06-14 NOTE — PROGRESS NOTES
Medication Therapy Management (MTM) Encounter  Assessment:                                                      1. Right pontine stroke (H)  Patient has not yet received updated aspirin dose, discussed the importance of picking up the aspirin 325 mg and taking every day instead of 81 mg daily, patient agreeable.  Appropriately taking all other medications prescribed.    2. Type 2 diabetes mellitus treated without insulin (H)  Patient requesting switching to continuous glucose monitor today, will send prescription to retail pharmacy today to check coverage.  Patient is able to schedule follow-up with MTM pharmacist for teaching of new meter if needed.  For now, recommend continuation of current therapy, no changes recommended today.    3. Essential hypertension  Appropriately taking hydrochlorothiazide and metoprolol, though has been dizzy for some time.  Patient using meclizine for dizziness which does help, if dizziness continues or worsens recommend follow-up with PCP at next visit.    4. Gastroesophageal reflux disease without esophagitis  Patient appropriately taking medications as prescribed by gastroenterologist, PPI and sucralfate, recommend continuation without change today.  Of note, patient's gastrointestinal symptoms may be related to chronic use of colchicine, see below for MTM alternative recommendation.    5. Moderate persistent asthma, unspecified whether complicated  Patient following with pulmonologist, appropriately using inhalers as prescribed and continuing chronic oral steroid, though concern for chronic steroid use and hyperglycemia.  If possible, would recommend discontinuing prednisone or lowest effective oral steroid dose, will route recommendation to pulmonologist today, Dr. Duong, Tamra Cottrell MD.     6. Nonspecific chest pain/Pericarditis  Continues to take colchicine, prescribed by cardiologist. Would exercise caution with persistent colchicine use as it may be largely contributing to  her gastrointestinal symptoms.     Plan:                                                     1. Freestyle Gemma CGM - patient to schedule appt for teaching if needed  2. Aspirin 325 mg daily; discontinue aspirin 81 mg daily  3.  Concern for chronic oral steroid use and increased risk for hyperglycemia; routed to pulmonologist for alternatives today  4.  Concern for chronic colchicine use GI symptoms; recommend caution with continued use    Follow Up  Return in about 7 weeks (around 3/10/2021) for Medication Review.  Cardio 2/9; Pulmo 2/25; PCP 3/5  Subjective & Objective                                                       Kulwant Amin is a 51 y.o. female called for a transitions of care visit. she was discharged from [facility] on 1/15/21 for Right pontine stroke. Professional telephonic Latvian  utilized today, also spoke with patients daughter in law today to review medications.     Chief Complaint: Hospital follow up  Medication Adherence/Access: HHN sets up pillbox every week. Takes medications four times a day, does not forget medication doses. Family helps her remember to take the medications. Reviewed medications virtually with daughter in law today. Gets medication refills by going to pharmacy.     Right Pontine Stroke: Atorvastatin 80 mg daily, aspirin 81 mg daily (currently prescribed 325 mg daily, but has not picked up yet) clopidogrel 75 mg daily, metoprolol tartrate 25 mg two times a day. Cardiology - Del Hirsch MD.  States that patient has been doing better recently. Experiencing headache today and aching pain in her legs especially her bones and joints. Taking tylenol 1,000 mg every 6 hours as needed, has not been very helpful for her.   Pulse Readings from Last 3 Encounters:   01/18/21 97   01/15/21 91   01/05/21 86     Lab Results   Component Value Date    HGB 13.4 01/13/2021     T2DM: metformin 850 mg twice daily and Novolog sliding scale insulin when needed for high BG.   Denies any low BG. It is normal for her to have nausea, nothing new for her.   Glucometer is giving them trouble. Wondering if they can have a CGM without poking pts fingers.    Neuropathy: gabapentin 300 mg two times a day. This has been working for her.   Lab Results   Component Value Date    HGBA1C 8.5 (H) 01/05/2021    HGBA1C 7.5 (H) 10/06/2020    HGBA1C 6.3 (H) 06/24/2020     Lab Results   Component Value Date    MICROALBUR <0.50 01/16/2020    LDLCALC 52 01/14/2021    CREATININE 0.65 01/14/2021     HTN: HCTZ 12.5 mg daily in the AM and metoprolol tartrate 25 mg two times a day. Endorsing dizziness right now but this has been an ongoing problem for her, states that she cannot even walk. Denies any falls. Has meclizine 25 mg three times a day for dizziness, this does help with dizziness.  BP Readings from Last 3 Encounters:   01/18/21 110/80   01/15/21 139/85   01/05/21 120/78     Pulse Readings from Last 3 Encounters:   01/18/21 97   01/15/21 91   01/05/21 86     Lab Results   Component Value Date    CREATININE 0.65 01/14/2021     GERD: omeprazole 40 mg daily and sucralfate 1 g four times a day. GI recommended continuing PPI and follow up GI clinic.   Impression from EGD 12/3/20 - mild antral erosions.    Ref. Range 1/13/2021 17:30   Magnesium Latest Ref Range: 1.8 - 2.6 mg/dL 1.9     Asthma:  montelukast 10 mg daily, Breo Ellipta 1 puff daily, spiriva once daily, albuterol inhaler 3-4 times a day as needed, prednisone 7.5 mg daily for asthma per pulmonologist. Sees Dr. Duong, pulmonologist f/u on 2/25. Appropriately using ICS/LABA, LAMA, and EVONNE.     Pericarditis: colchicine 0.6 mg daily - taking chronically. Last Authorized by: FAUSTINO ISSA f/u 2/9/21.   No results found for: URICACID    PMH: reviewed in EPIC   Allergies/ADRs: reviewed in EPIC   Alcohol: Pt reports none  Tobacco:   Social History     Tobacco Use   Smoking Status Former Smoker     Types: Cigarettes     Quit date: 2003     Years  since quittin.0   Smokeless Tobacco Never Used   Tobacco Comment    stoped 15 yrs ago     Recent Vitals:   BP Readings from Last 3 Encounters:   21 110/80   01/15/21 139/85   21 120/78      Wt Readings from Last 3 Encounters:   21 132 lb 12 oz (60.2 kg)   21 135 lb 3.2 oz (61.3 kg)   21 134 lb 6 oz (61 kg)     ----------------  Post Discharge Medication Reconciliation Status: discharge medications reconciled, continue medications without change    The patient declined an after visit summary    I spent 60 minutes with this patient today;  . All changes were made via collaborative practice agreement with Adrien Cardoza MD. A copy of the visit note was provided to the patient's provider.     Malu Lane), PharmD, BCACP  Medication Therapy Management Pharmacist  [facility]    Telemedicine Visit Details    Type of service:  Telephone     Start Time: 10:00 AM  End Time (time video/phone call stopped): 11:00 AM    Originating Location (pt. Location): Home    Distant Location (provider location):  [facility]    Mode of Communication:   Telephone     Current Outpatient Medications   Medication Sig Dispense Refill     acetaminophen (TYLENOL) 500 MG tablet Take 2 tablets (1,000 mg total) by mouth every 8 (eight) hours as needed for pain. 90 tablet 10     albuterol (PROVENTIL) 2.5 mg /3 mL (0.083 %) nebulizer solution Take 3 mL (2.5 mg total) by nebulization every 6 (six) hours as needed for wheezing. 150 mL 12     albuterol (VENTOLIN HFA) 90 mcg/actuation inhaler Inhale 2 puffs every 6 (six) hours as needed for wheezing. 1 Inhaler 12     amitriptyline (ELAVIL) 10 MG tablet TAKE 2 TABLETS (20 MG TOTAL) BY MOUTH AT BEDTIME. 180 tablet 3     aspirin 325 MG tablet Take 1 tablet (325 mg total) by mouth daily with breakfast. 30 tablet 11     atorvastatin (LIPITOR) 80 MG tablet TAKE 1 TABLET (80 MG TOTAL) BY MOUTH AT  "BEDTIME FOR CHOLESTEROL 30 tablet 3     blood glucose meter (GLUCOMETER) Use 1 each As Directed 3 (three) times a day. Dispense glucometer brand per patient's insurance at pharmacy discretion.(E11.9) 1 each 0     blood glucose test strips Use 1 each As Directed 3 (three) times a day. Dispense brand per patient's insurance at pharmacy discretion.(E11.9) 300 strip 3     clopidogreL (PLAVIX) 75 mg tablet TAKE 1 TABLET (75 MG TOTAL) BY MOUTH DAILY. 90 tablet 1     colchicine 0.6 mg tablet TAKE 1 TABLET (0.6 MG TOTAL) BY MOUTH DAILY FOR GOUT 30 tablet 0     ferrous sulfate 325 (65 FE) MG tablet Take 1 tablet (325 mg total) by mouth daily with breakfast. 90 tablet 3     flash glucose scanning reader (FREESTYLE MONICA 14 DAY READER) Misc Use 1 Units As Directed every 8 (eight) hours. 1 each 0     flash glucose sensor (FREESTYLE MONICA 14 DAY SENSOR) Kit Use 1 Units As Directed every 14 (fourteen) days. 6 kit 3     fluticasone furoate-vilanteroL (BREO ELLIPTA) 200-25 mcg/dose DsDv inhaler Inhale 1 puff daily. 1 each 12     gabapentin (NEURONTIN) 300 MG capsule TAKE 1 CAPSULE (300 MG TOTAL) BY MOUTH 2 (TWO) TIMES A DAY FOR NERVE PAIN 180 capsule 3     generic lancets Use 1 each As Directed 3 (three) times a day. Dispense brand per patient's insurance at pharmacy discretion.(E11.9) 300 each 3     hydroCHLOROthiazide (MICROZIDE) 12.5 mg capsule TAKE 1 CAPSULE (12.5 MG TOTAL) BY MOUTH DAILY FOR BLOOD PRESSURE 90 capsule 2     INJECT EASE LANCETS 30 gauge Misc USE 1 EACH AS DIRECTED THREE TIMES A DAY *34 DAYS* 100 each 11     insulin aspart U-100 (NOVOLOG) 100 unit/mL injection Inject under the skin 3 (three) times a day with meals. Use per sliding scale < 150 - No insulin, 151-200 use 3 U, 201- 250 use 5 U,   251- 300 use 8 U        insulin syringe-needle U-100 0.3 mL 30 x 3/8\" Syrg Use as needed sliding scale insulin as directed. 100 Syringe 0     meclizine (ANTIVERT) 25 mg tablet Take 1 tablet (25 mg total) by mouth 3 (three) " times a day as needed for dizziness. 28 tablet 0     metFORMIN (GLUCOPHAGE) 850 MG tablet Take 1 tablet (850 mg total) by mouth 2 (two) times a day with meals. 60 tablet 11     metoprolol tartrate (LOPRESSOR) 25 MG tablet TAKE 1 TABLET (25 MG TOTAL) BY MOUTH 2 (TWO) TIMES A DAY FOR BLOOD PRESSURE 60 tablet 3     montelukast (SINGULAIR) 10 mg tablet TAKE 1 PILL (10 MG TOTAL) BY MOUTH AT BEDTIME FOR ASTHMA 30 tablet 10     omeprazole (PRILOSEC) 20 MG capsule Take 2 capsules (40 mg total) by mouth daily before breakfast. 30 capsule 12     polyethylene glycol (GLYCOLAX) 17 gram/dose powder MIX 17 GRAMS WITH LIQUID AND DRINK ONCE DAILY. Hold for loose stools. 510 g 12     predniSONE (DELTASONE) 2.5 MG tablet Take 7.5 mg by mouth daily Please take 3 tablets a day every day.. 90 tablet 11     sucralfate (CARAFATE) 1 gram tablet Take 1 tablet (1 g total) by mouth 4 (four) times a day before meals and at bedtime. Take 1 hour prior to meals 120 tablet 1     tiotropium (SPIRIVA) 18 mcg inhalation capsule Place 1 capsule (2 puffs total) into inhaler and inhale daily. Place 1 capsule into the inhaler device. Close and press button to crush the capsule. Inhale the contents of the crushed capsule in 2 breaths. 30 capsule 11     No current facility-administered medications for this visit.         Medication Therapy Recommendations  No medication therapy recommendations to display

## 2021-06-14 NOTE — TELEPHONE ENCOUNTER
Attempted to reach patient via  line.  Left detailed VM message that the Incruse ellipta inhaler prescribed by Dr. Duong was not covered by her insurance.  This will be replaced with Spiriva inhaler.  Left our phone number to call back if any further questions.

## 2021-06-14 NOTE — TELEPHONE ENCOUNTER
Daughter called back this morning.  Informed BS was 424, which is very high.  Daughter assured caller, pt IS taking her insulin as directed - 3 times a day.  Just started Metformin.  Still needing to run A1C and send Lipid panel out to lab.  Pt does not need to RTN to clinic for lab draw.  Will check with Dr. Cardoza tomorrow when she returns.  Did relay covering provider's message to daughter and understood.  CMT will call daughter after speaking with Dr. Cardoza tomorrow and having A1C results.  Daughter understands. Thanks.

## 2021-06-14 NOTE — PROGRESS NOTES
"TCM DISCHARGE FOLLOW UP CALL      \"Hi, my name is  Britney, and I am calling from [facility].  I am calling to follow up and see how things are going for you after your recent hospitalization.      Tell me how you are doing now that you are home?\" Caregiver answered the phone and states that Kulwant is feeling okay. Caregiver is the PCA for Kulwant.      Discharge Instructions     Do you understand your discharge instructions?  Pt. Response:  Yes      \"Has an appointment with your primary care provider been scheduled?\"  Yes    \"If yes, when is that appointment?    Today at 3:20pm    If no, date of appointment scheduled: N/A       Medications    \"Did you have any medication changes?   Yes     Do you have any concerns with obtaining the medications or questions about your medications that you would like a RN to review with you?  No      \"When you see the provider, I would recommend that you bring your medications with you.\"      Call Summary    \"What questions or concerns do you have about your recent visit and your follow-up care?\"  No              - RN Triage Number is [phone] if patient needs to be immediatly transferred -         \"If you have questions or things don't continue to improve, we encourage you contact us through the main clinic number at [phone].  Even if the clinic is not open, triage nurses are available 24/7 to help you.        The Clinic Community Health Worker spoke with the patient today due to ED/Hospital Discharge to discuss possible Clinic Care Coordination enrollment.  The service was described to the patient and immediate needs were discussed.  The patient declined enrollment at this time.  The PCP is encouraged to refer in the future if the patient's needs change.  "

## 2021-06-14 NOTE — TELEPHONE ENCOUNTER
Refill Approved    Rx renewed per Medication Renewal Policy. Medication was last renewed on 07/29/2020.  Last office visit was today, 01/18/2021 with PCP office.    Marsha Monroy, [facility] Connection Triage/Med Refill 1/18/2021     Requested Prescriptions   Pending Prescriptions Disp Refills     clopidogreL (PLAVIX) 75 mg tablet [Pharmacy Med Name: CLOPIDOGREL 75 MG TABLET 75 Tablet] 30 tablet 1     Sig: TAKE 1 TABLET (75 MG TOTAL) BY MOUTH DAILY.       Clopidogrel/Prasugrel/Ticagrelor Refill Protocol Passed - 1/18/2021  9:29 AM        Passed - PCP or prescribing provider visit in past 6 months       Last office visit with prescriber/PCP: 12/11/2020 OR same dept: 12/11/2020 Adrien Cardoza MD OR same specialty: 12/11/2020 Adrien Cardoza MD Last physical: 1/5/2021 Last MTM visit: 5/21/2019 Malu Muñoz, PharmD     Next appt within 3 mo: Visit date not found  Next physical within 3 mo: Visit date not found  Prescriber OR PCP: Adrien Cardoza MD  Last diagnosis associated with med order: 1. S/P coronary artery stent placement  - clopidogreL (PLAVIX) 75 mg tablet [Pharmacy Med Name: CLOPIDOGREL 75 MG TABLET 75 Tablet]; TAKE 1 TABLET (75 MG TOTAL) BY MOUTH DAILY.  Dispense: 30 tablet; Refill: 1    If protocol passes may refill for 6 months if within 3 months of last provider visit (or a total of 9 months).              Passed - Hemoglobin in past 12 months     Hemoglobin   Date Value Ref Range Status   01/13/2021 13.4 12.0 - 16.0 g/dL Final

## 2021-06-14 NOTE — PROGRESS NOTES
We received a fax back from [facility] stating that this medication does not require prior auth at this time.  I called pharmacy and they said that it must have been sent in error.

## 2021-06-14 NOTE — PROGRESS NOTES
"FEMALE PREVENTATIVE EXAM    Assessment and Plan:     1. Routine general medical examination at a health care facility  PAP due in 2022  Mammogram done in 8/2020    2. Type 2 diabetes mellitus treated without insulin (H)  Uses sliding scale insulin as needed. Need syringes  - metFORMIN (GLUCOPHAGE) 500 MG tablet; Take one tab twice a day  Dispense: 60 tablet; Refill: 2  - Comprehensive Metabolic Panel  - Glycosylated Hemoglobin A1c  - Lipid Cascade RANDOM  - insulin syringe-needle U-100 0.3 mL 30 x 3/8\" Syrg; Use as needed sliding scale insulin as directed.  Dispense: 100 Syringe; Refill: 0    3. Microcytic anemia  - ferrous sulfate 325 (65 FE) MG tablet; Take 1 tablet (325 mg total) by mouth daily with breakfast.  Dispense: 90 tablet; Refill: 3  - HM2(CBC w/o Differential)    4. Essential hypertension  Received home BP cuff  - hydroCHLOROthiazide (MICROZIDE) 12.5 mg capsule; TAKE 1 CAPSULE (12.5 MG TOTAL) BY MOUTH DAILY FOR BLOOD PRESSURE  Dispense: 90 capsule; Refill: 2    5. Moderate persistent asthma without complication  Managed by Pulm, appreciate help.    6. Moderate major depression (H)  Stable      Next follow up:  No follow-ups on file.    Immunization Review  Adult Imm Review: No immunizations due today      I discussed the following with the patient:   Adult Healthy Living: Importance of regular exercise      Subjective:   Chief Complaint: Kulwant Amin is an 51 y.o. female here for a preventative health visit.    HPI: The patient here is for physical.  All chronic medical problems stable per patient and daughter-in-law.  Blood sugars been high in the low 200s some days lately, they have been using sliding scale insulin.  They need new syringes.    She has hospital bed for 7 years, needing new mattress.  She uses bed every day.  She needs her head to be elevated due to breathing problems.  She needs help getting in and out of bed due to generalized weakness.    No acute fever.  No recent worsening " "shortness of breath or wheezing other than her usual asthma symptoms.  No acute chest pain or palpitations.  No known exposure to COVID-19.    Healthy Habits  Are you taking a daily aspirin? Yes  Do you typically exercising at least 40 min, 3-4 times per week?  Yes, therapy   Do you usually eat at least 4 servings of fruit and vegetables a day, include whole grains and fiber and avoid regularly eating high fat foods? Yes  Have you had an eye exam in the past two years? NO  Do you see a dentist twice per year? NO five yrs ago  Do you have any concerns regarding sleep? YES    Safety Screen  If you own firearms, are they secured in a locked gun cabinet or with trigger locks? The patient does not own any firearms  Do you feel you are safe where you are living?: Yes (12/11/2020  3:24 PM)  Do you feel you are safe in your relationship(s)?: Yes (12/11/2020  3:24 PM)      Review of Systems:  Please see above.  The rest of the review of systems are negative for all systems.     Cancer Screening       Status Date      MAMMOGRAM Next Due 8/4/2021      Done 8/4/2020 MAMMO SCREENING BILATERAL     Patient has more history with this topic...    PAP SMEAR Next Due 8/31/2022      Done 8/31/2017 GYNECOLOGIC CYTOLOGY (PAP SMEAR)          History     Not marked as reviewed during this visit.            Objective:   Vital Signs:     Vitals:    01/05/21 1506   BP: 120/78   Pulse: 86   Resp: 16   Temp: 98.2  F (36.8  C)   TempSrc: Oral   SpO2: 97%   Weight: 134 lb 6 oz (61 kg)   Height: 5' 4\" (1.626 m)         PHYSICAL EXAM  Gen - alert, orientated, NAD  Eyes - fundascopic exam limited by the undialated pupil but looks symmetric  ENT - oropharynx clear, TMs clear  Neck - supple, no palpable mass or lymphadenopathy  CV - RRR, no murmur  Breast - no dominant mass on either side, no axillary mass.  Resp - Mild wheezing bilaterally.   Ab - soft, nontender, no palpable mass or organomegaly   -  Declined. l  Extrem - warm, no edema  Neuro - " CN II-XII intact, strength, sensation, reflexes intact and symmetric  Skin - no rash.  No atypical appearing lesions seen.       Additional Screenings Completed Today:

## 2021-06-14 NOTE — PROGRESS NOTES
Assessment/Plan:        Diagnoses and all orders for this visit:    Moderate persistent asthma without complication  -     albuterol (PROVENTIL) 2.5 mg /3 mL (0.083 %) nebulizer solution; Take 3 mL (2.5 mg total) by nebulization every 6 (six) hours as needed for wheezing.  Dispense: 75 mL; Refill: 12    Moderate persistent asthma with acute exacerbation  -     mometasone-formoterol (DULERA) 200-5 mcg/actuation HFAA inhaler; INHALE 2 PUFFS BY MOUTH TWO TIMES A DAY  Dispense: 26 g; Refill: 11    Simple chronic bronchitis    Multiple pulmonary nodules    47-year-old female with report of asthma and chronic bronchitis.  Her primary issue is shortness of breath.  This is complicated by the fact that she has essentially no physical activity and is limited by joint and muscle pain and malaise.  I do not think there is an inhaler or other medication that will improve her breathing further.  I discussed with her the importance of staying active in the home if at all possible with helping with some work with the family are doing some walking in the hallway.    Because of her history of exposure to smoke we will treat her as if this is chronic bronchitis and asthma.  She does get some relief from these medications.    On further review of her past CT scans her nodules in 2015 had been stable back to 2013.  They do not need further follow-up.            Subjective:    Patient ID: Kulwant Amin is a 47 y.o. female.    HPI Comments: As of breath.  Worse than her last visit 6 months ago.  It is worse with exertion.  It improves with rest oftentimes.  Her albuterol nebulizer sometimes improves her breathing as well.  Symptoms localized to his chest.  Pertinent positives include general weakness.  Pertinent negatives include no sputum production, no hemoptysis, no fever.  No weight loss.    Asthma: Multiple ER visits but no hospitalizations since her last visit.  She is taking Spiriva daily, Dulera 2 puffs twice daily.  Albuterol  3-4 times daily as needed.  Her triggers include cold weather.  No fumes.    She has essentially no activity at home.  She stays seated all the time.  Her inactivity is due to shortness of breath and due to joint pain.  Is not do any cares of her home.  She is dependent on her family for help.    Asthma   Her past medical history is significant for asthma.         Review of Systems  Pertinent as noted in HPI.        Objective:    Physical Exam   Constitutional: She is oriented to person, place, and time. No distress.   Appears older than stated age.  Fatigue.   HENT:   Head: Normocephalic and atraumatic.   Nose: Nose normal.   Eyes: Conjunctivae are normal. Pupils are equal, round, and reactive to light. Right eye exhibits no discharge. Left eye exhibits no discharge. No scleral icterus.   Neck: Normal range of motion. No tracheal deviation present. No thyromegaly present.   Cardiovascular: Normal rate, regular rhythm and normal heart sounds.  Exam reveals no gallop.    No murmur heard.  Pulmonary/Chest: Effort normal and breath sounds normal. No stridor. No respiratory distress. She has no wheezes.   Lymphadenopathy:     She has no cervical adenopathy.   Neurological: She is alert and oriented to person, place, and time.   Skin: Skin is warm and dry. She is not diaphoretic. No erythema.   Psychiatric: Her behavior is normal. Thought content normal.   Depressed affect   Nursing note and vitals reviewed.          Current Outpatient Prescriptions on File Prior to Visit   Medication Sig Dispense Refill     acetaminophen (PAIN RELIEF EXTRA STRENGTH) 500 MG tablet TAKE 1 TABLET (500 MG TOTAL) BY MOUTH EVERY 6 (SIX) HOURS AS NEEDED FOR PAIN. 90 tablet 1     albuterol (PROAIR HFA;PROVENTIL HFA;VENTOLIN HFA) 90 mcg/actuation inhaler Inhale 2 puffs every 6 (six) hours as needed for wheezing. 1 Inhaler 2     albuterol (PROVENTIL) 2.5 mg /3 mL (0.083 %) nebulizer solution Take 3 mL (2.5 mg total) by nebulization every 6 (six)  hours as needed for wheezing. 75 mL 12     amitriptyline (ELAVIL) 10 MG tablet Take 1 tablet (10 mg total) by mouth at bedtime. 30 tablet 5     cimetidine (HEARTBURN RELIEF, CIMETIDINE,) 200 MG tablet Take 2 tablets (400 mg total) by mouth at bedtime. 60 tablet 5     guaiFENesin (ROBITUSSIN) 100 mg/5 mL syrup Take 5 mL (100 mg total) by mouth 3 (three) times a day as needed for cough (DO NOT DRIVE OR OPERATE HEAVY MACHINERY WHILE ON ROBITUSSIN). 45 mL 0     hydroCHLOROthiazide (MICROZIDE) 12.5 mg capsule Take 1 capsule (12.5 mg total) by mouth daily. 90 capsule 1     mometasone-formoterol (DULERA) 200-5 mcg/actuation HFAA inhaler INHALE 2 PUFFS BY MOUTH TWO TIMES A DAY 26 g 5     montelukast (SINGULAIR) 10 mg tablet TAKE 1 PILL (10 MG TOTAL) BY MOUTH AT BEDTIME FOR ASTHMA 30 tablet 5     tiotropium bromide (SPIRIVA RESPIMAT) 2.5 mcg/actuation Mist Inhale 2 puffs daily. 4 g 4     benzonatate (TESSALON) 100 MG capsule Take 1 capsule (100 mg total) by mouth 3 (three) times a day as needed for cough. 42 capsule 0     blood glucose test Strp Use to test blood sugar up to 3 times daily.  Contour Next EZ. 50 strip 1     codeine-guaiFENesin (GUAIFENESIN AC)  mg/5 mL liquid Take 5 mL by mouth 3 (three) times a day as needed for cough. 120 mL 0     diphenhydrAMINE (BENADRYL) 25 mg tablet Take 1 tablet (25 mg total) by mouth bedtime as needed for sleep. 30 tablet 2     ibuprofen (ADVIL,MOTRIN) 600 MG tablet Take 1 tablet (600 mg total) by mouth every 6 (six) hours as needed for pain. 20 tablet 0     lancets (MICROLET LANCET) Misc Use to test blood sugars up to 3 times daily. 100 each 0     predniSONE (DELTASONE) 10 mg tablet Take 2 tablets (20 mg total) by mouth daily. 8 tablet 0     [DISCONTINUED] albuterol (PROAIR HFA;PROVENTIL HFA;VENTOLIN HFA) 90 mcg/actuation inhaler Inhale 1-2 puffs every 6 (six) hours as needed for wheezing. 1 Inhaler 0     No current facility-administered medications on file prior to visit.       /82  Pulse 83  Resp 16  Wt 127 lb (57.6 kg)  SpO2 99% Comment: RA  BMI 24.29 kg/m2    Medical History  Active Ambulatory (Non-Hospital) Problems    Diagnosis     Prediabetes     GERD (gastroesophageal reflux disease)     Muscle cramps     Pain     Insomnia     Hyperglycemia     HTN (hypertension)     Past Medical History:   Diagnosis Date     Anxiety      Arthritis      Asthma      Back pain      COPD (chronic obstructive pulmonary disease)      Depression      Diabetes mellitus      Generalized headaches      HTN (hypertension)      Pneumonia         Surgical History  She  has no past surgical history on file.       Social History  Reviewed, she lives at home with her family.   Allergies  No Known Allergies                             Data Review - imaging, labs, and ekgs listed below were reviewed by me.  Chest XRay and chest CT images and EKG tracings interpreted personally.     Past Labs  Lab on 10/04/2017   Component Date Value     Fecal Occult Bld (ICT) 1 10/05/2017 Negative      Fecal Occult Blood (ICT)* 10/05/2017 Negative      Fecal Occult Blood (ICT)* 10/05/2017 Negative    Office Visit on 10/04/2017   Component Date Value     Hemoglobin A1c 10/04/2017 6.4*     Glucose 10/04/2017 81      Patient Fasting > 8hrs? 10/04/2017 Yes        Past Imaging  Xr Chest Pa And Lateral    Result Date: 10/28/2017  XR CHEST PA AND LATERAL 10/28/2017 12:50 PM INDICATION: cough COMPARISON: 6/8/2017 FINDINGS: No new abnormality. Resolution of minimal left lower lobe infiltrate, lungs now clear. Stable tiny granuloma lateral right lung base. Heart size normal.  Total time greater than 25 minutes, greater than 50% counseling and coordination of care per

## 2021-06-14 NOTE — TELEPHONE ENCOUNTER
"[facility] Care Coordinator is calling in regards to DME ordered from [facility].  They will be needing notes to support order for BP cuff and a new mattress.  [facility] faxed a form which contains \"qualifying questions\".    Please look out for this form and please send to [facility] pool when it has been received.     Thank you.  "

## 2021-06-14 NOTE — TELEPHONE ENCOUNTER
RN cannot approve Refill Request    RN can NOT refill this medication med is not covered by policy/route to provider. Last office visit: Visit date not found Last Physical: Visit date not found Last MTM visit: 5/21/2019 Malu Muñoz, PharmD Last visit same specialty: 12/11/2020 Adrien Cardoza MD.  Next visit within 3 mo: Visit date not found  Next physical within 3 mo: Visit date not found      Elizabeth Baird, [facility] Triage/Med Refill 12/30/2020    Requested Prescriptions   Pending Prescriptions Disp Refills     TRAVEL SICKNESS, MECLIZINE, 25 mg chewable tablet [Pharmacy Med Name: MECLIZINE 25 MG TAB CHEW 25 Tablet] 21 tablet 1     Sig: TAKE 1 TABLET (25 MG TOTAL) BY MOUTH 3 (THREE) TIMES A DAY.       There is no refill protocol information for this order

## 2021-06-14 NOTE — PROGRESS NOTES
Assessment & Plan     Kulwant was seen today for follow-up.    Diagnoses and all orders for this visit:    Right pontine stroke (H)  -     aspirin 325 MG tablet; Take 1 tablet (325 mg total) by mouth daily with breakfast.    Type 2 diabetes mellitus treated without insulin (H)  -     metFORMIN (GLUCOPHAGE) 850 MG tablet; Take 1 tablet (850 mg total) by mouth 2 (two) times a day with meals.    I sent a prescription for Metformin 850 mg because she only had a 30-day supply.    I sent a prescription for aspirin 325 mg.    He has MT appointment January 20.    44 minutes spent on the date of the encounter doing chart review, patient visit and documentation          BMI:   Estimated body mass index is 25.93 kg/m  as calculated from the following:    Height as of 1/14/21: 5' (1.524 m).    Weight as of this encounter: 132 lb 12 oz (60.2 kg).         Return in about 2 days (around 1/20/2021) for Stroke.    Avi Hankins MD  [facility]     Kulwant Amin is 51 y.o. and presents to clinic today for the following health issues   HPI     She was admitted with vertigo.  She was found to have pontine stroke.  Neurology recommended increase of aspirin to 325 mg.  She continues to take clopidogrel.  Metformin was increased to 850 mg twice daily because her A1c had been elevated in the past.    She has not yet obtained 325 mg aspirin.    She has a walker at home.        Hospital Follow-up Visit:    Hospital/Nursing Home/ Rehab Facility: [facility]  Date of Admission: 1/13/21  Date of Discharge: 1/15/21  Reason(s) for Admission: Pontine stroke    Was your hospitalization related to COVID-19? No  Problems taking medications regularly:  None  Medication changes since discharge: None  Problems adhering to non-medication therapy:  None    Summary of hospitalization:  [facility] hospital discharge summary reviewed  Diagnostic Tests/Treatments reviewed.  Follow up  needed: none  Other Healthcare Providers Involved in Patient s Care:         None  Update since discharge: stable.      Post Discharge Medication Reconciliation: discharge medications reconciled, continue medications without change.  Plan of care communicated with patient and family                Review of Systems  No fever or cough.      Objective    /80 (Patient Site: Left Arm, Patient Position: Sitting, Cuff Size: Adult Regular)   Pulse 97   Temp 98.3  F (36.8  C) (Oral)   Wt 132 lb 12 oz (60.2 kg)   SpO2 98%   BMI 25.93 kg/m    Body mass index is 25.93 kg/m .  Physical Exam  Heart normal  Lungs normal  Using a cane

## 2021-06-14 NOTE — TELEPHONE ENCOUNTER
Called daughter who states her BS is 198 taken 5 minutes ago.  Reminded daughter pt will need to take 3 units of her insulin this am per her sliding scale. Daughter understands.      A1C was 8.5 and Triglycerides were 299 and HDL was 49.  Does PCP wish anything further to be done.  Thanks.

## 2021-06-14 NOTE — TELEPHONE ENCOUNTER
Critical lab result: Glucose= 424. Drawn @ 3:30pm. Ordered by Dr JEANNINE Cardoza. Physical today @ [facility] clinic.   DX type 2 diabetes treated with sliding scale insulin as needed. Metformin 500mg tablets, take one tablet twice a day.     Dr MARVA Alegria on call. Answering service contacted @ 9:22pm.   Attempted to contact patient: no answer. LMTCB #1 vm @9:19pm.   Dr Alegria called: If patient calls back: she should continue to monitor her blood sugar. If repeated vomiting, then she would need to be seen.   [facility]    Felipa Valle RN Triage Nurse Advisor 9:30 PM 1/5/2021     Reason for Disposition    Lab or radiology calling with CRITICAL test results    Additional Information    Negative: Lab calling with strep throat test results and triager can call in prescription    Negative: Lab calling with urinalysis test results and triager can call in prescription    Negative: Medication questions    Negative: ED call to PCP    Negative: Physician call to PCP    Negative: Call about patient who is currently hospitalized    Protocols [facility]

## 2021-06-14 NOTE — TELEPHONE ENCOUNTER
RN cannot approve Refill Request    RN can NOT refill this medication historical medication requested. Last office visit: 12/11/2020 Adrien Cardoza MD Last Physical: 9/7/2018 Last MTM visit: 5/21/2019 Malu Muñoz, PharmD Last visit same specialty: 12/11/2020 Adrien Cardoza MD.  Next visit within 3 mo: Visit date not found  Next physical within 3 mo: Visit date not found      Marsha Monroy, [facility] Triage/Med Refill 12/25/2020    Requested Prescriptions   Pending Prescriptions Disp Refills     metoprolol tartrate (LOPRESSOR) 25 MG tablet [Pharmacy Med Name: METOPROLOL TARTRATE 25 MG T 25 Tablet] 60 tablet 3     Sig: TAKE 1 TABLET (25 MG TOTAL) BY MOUTH 2 (TWO) TIMES A DAY FOR BLOOD PRESSURE       Beta-Blockers Refill Protocol Passed - 12/22/2020 12:04 PM        Passed - PCP or prescribing provider visit in past 12 months or next 3 months     Last office visit with prescriber/PCP: 12/11/2020 Adrien Cardoza MD OR same dept: 12/11/2020 Adrien Cardoza MD OR same specialty: 12/11/2020 Adrien Cardoza MD  Last physical: 9/7/2018 Last MTM visit: 5/21/2019 Malu Muñoz, PharmD   Next visit within 3 mo: Visit date not found  Next physical within 3 mo: Visit date not found  Prescriber OR PCP: Adrien Cardoza MD  Last diagnosis associated with med order: 1. S/P coronary artery stent placement  - atorvastatin (LIPITOR) 80 MG tablet [Pharmacy Med Name: ATORVASTATIN 80 MG TABLET 80 Tablet]; TAKE 1 TABLET (80 MG TOTAL) BY MOUTH AT BEDTIME FOR CHOLESTEROL  Dispense: 30 tablet; Refill: 3    If protocol passes may refill for 12 months if within 3 months of last provider visit (or a total of 15 months).             Passed - Blood pressure filed in past 12 months     BP Readings from Last 1 Encounters:   12/21/20 110/72                atorvastatin (LIPITOR) 80 MG tablet [Pharmacy Med Name: ATORVASTATIN 80 MG TABLET 80 Tablet] 30 tablet 3     Sig: TAKE 1 TABLET (80 MG TOTAL) BY MOUTH AT BEDTIME FOR CHOLESTEROL       Statins Refill  Protocol (Hmg CoA Reductase Inhibitors) Passed - 12/22/2020 12:04 PM        Passed - PCP or prescribing provider visit in past 12 months      Last office visit with prescriber/PCP: 12/11/2020 Adrien Cardoza MD OR same dept: 12/11/2020 Adrien Cardoza MD OR same specialty: 12/11/2020 Adrien Cardoza MD  Last physical: 9/7/2018 Last MTM visit: 5/21/2019 Malu Muñoz, PharmD   Next visit within 3 mo: Visit date not found  Next physical within 3 mo: Visit date not found  Prescriber OR PCP: Adrien Cardoza MD  Last diagnosis associated with med order: 1. S/P coronary artery stent placement  - atorvastatin (LIPITOR) 80 MG tablet [Pharmacy Med Name: ATORVASTATIN 80 MG TABLET 80 Tablet]; TAKE 1 TABLET (80 MG TOTAL) BY MOUTH AT BEDTIME FOR CHOLESTEROL  Dispense: 30 tablet; Refill: 3    If protocol passes may refill for 12 months if within 3 months of last provider visit (or a total of 15 months).

## 2021-06-14 NOTE — PROGRESS NOTES
ASSESMENT AND PLAN:  Diagnoses and all orders for this visit:    Asthma without status asthmaticus  -     predniSONE (DELTASONE) 20 MG tablet; Take 1 tablet (20 mg total) by mouth 2 (two) times a day for 5 days.  Dispense: 10 tablet; Refill: 0  -     azithromycin (ZITHROMAX Z-CLIFFORD) 250 MG tablet; Take 500 mg (2 x 250 mg tablets) on day 1 followed by 250 mg (1 tablet) on days 2-5.  Dispense: 6 tablet; Refill: 0  She tends to get acute exacerbation during the winter months.  Above breast prescriptions printed and given to the daughter-in-law with instruction to fill it only if needed.  She requested a follow-up appointment in 2 months.    HTN (hypertension)  Controlled with current medication.  Continue hydrochlorothiazide 12.5 mg daily.  2 no refill needed today.    Prediabetes  A1c 6.4 in October.  Recheck at next visit.    Pain  -     amitriptyline (ELAVIL) 10 MG tablet; Take 1 tablet (10 mg total) by mouth at bedtime.  Dispense: 30 tablet; Refill: 5    Other orders  -     guaiFENesin (ROBITUSSIN) 100 mg/5 mL syrup; Take 5 mL (100 mg total) by mouth 3 (three) times a day as needed for cough (DO NOT DRIVE OR OPERATE HEAVY MACHINERY WHILE ON ROBITUSSIN).  Dispense: 45 mL; Refill: 0      Spent 25 min face to face with patient with more the 50% spent in counseling and coordination of care and discussing problems listed above.        SUBJECTIVE: Kulwant Amin  is a 47-year-old female with history of asthma, hypertension, anxiety here for follow-up.   She was in the ER 2 weeks ago due to worsening cough and shortness of breath.  She was given prednisone for 5 days.  States her breathing is improving.  She is back to her baseline. All medications are delivered monthly to her home.  He needs albuterol inhaler and albuterol solution for nebulizer machine.  Her symptoms usually worse in the winter months.     She is taking omeprazole and zantac both for heartburn, omeprazole was discontinued a few months ago but patient is  "still getting it from the pharmacy. No nausea or vomiting.  No blood in the stool.  She does not smoke or drink .       Past Medical History:   Diagnosis Date     Anxiety      Arthritis      Asthma      Back pain      COPD (chronic obstructive pulmonary disease)      Depression      Diabetes mellitus      Generalized headaches      HTN (hypertension)      Pneumonia      Patient Active Problem List   Diagnosis     Hyperglycemia     HTN (hypertension)     Moderate persistent asthma with acute exacerbation     Insomnia     Pain     Muscle cramps     GERD (gastroesophageal reflux disease)     Prediabetes       Allergies:  No Known Allergies    History   Smoking Status     Former Smoker   Smokeless Tobacco     Never Used       Review of systems otherwise negative except as listed in HPI.   History   Smoking Status     Former Smoker   Smokeless Tobacco     Never Used       OBJECTICE: /86 (Patient Site: Right Arm, Patient Position: Sitting, Cuff Size: Adult Regular)  Pulse 89  Temp 98.3  F (36.8  C) (Oral)   Resp 20  Ht 5' 0.63\" (1.54 m)  Wt 132 lb 7 oz (60.1 kg)  SpO2 97%  BMI 25.33 kg/m2    DATA REVIEWED:    Labs Reviewed or Ordered (1):       GEN-alert,  in no apparent distress.  HEENT-mucous membranes are moist, neck is supple.  CV-regular rate and rhythm with no murmur.   RESP-lungs clear to auscultation . No wheezing today  ABDOMEN- Soft , not tender.  EXTREM- No edema.Sensationintact.   SKIN-no unusual rashes.         Adrien Cardoza   11/15/2017   "

## 2021-06-14 NOTE — TELEPHONE ENCOUNTER
This was from yesterday. Can you call and ask glucose from this morning?  Please advise to use sliding scale insulin as directed.     Dr. Adrien Cardoza  1/7/2021 9:09 AM

## 2021-06-14 NOTE — PROGRESS NOTES
We received a PA request for pt's Dulera 200-5mcg inhaler.  I started a PA on CMM's, waiting for the clinical questions to come back, then I will send it in.

## 2021-06-14 NOTE — TELEPHONE ENCOUNTER
New Appointment Needed  What is the reason for the visit:    Inpatient/ED Follow Up Appt Request  At what hospital or facility were you seen?: [facility]  What is the reason you were seen?: Stroke  What date were you admitted?: 1/13/2021  What date were you discharged?: 1/15/2021  What was the recommended timeframe for your follow up appointment?: 2 days  Provider Preference: PCP only  Okay to leave a detailed message:  Yes

## 2021-06-15 NOTE — PROGRESS NOTES
ITP ASSESSMENT   Assessment Day: Initial  Session Number: 1  Precautions: Standard/ Falls  Diagnosis: Stent  Risk Stratification: Medium  Referring Provider: Angelo Serrano,*  EXERCISE  Exercise Assessment: Initial   Pt tolerated 8.5 min of low level exercise on her first session of CR. Denies any cardiac sx's with exercise. Met level was 1.8                       Exercise Plan  Goals Next 30 days  ADL'S: Resume light cooking and housework  Leisure: Start a home walking program    Education Goals: Patient can state cardiac s/s and appropriate emergency response.;Has system for taking medication.;Medication review  Education Goals Met: Patient can state cardiac s/s and appropriate emergency response.    Exercise Prescription  Exercise Mode: Treadmill;Bike;Nustep;Arm Erg.;Hallway Walking  Frequency: 2 x a week  Duration: 20-40 min  Intensity / THR: 20-30 beats above resting heart rate  RPE 11-14  Progression / Met level: 2-2.7  Resistive Training?: Yes    Current Exercise (mins/week): 10    Interventions  Home Exercise:  Mode: Walking program  Frequency: 5-6 x a week  Duration: 10-15 min -1 or 2 x a day    Education Material : Educational videos;Provide written material;Individual education and counseling;Offer educational classes    Education Completed  Exercise Education Completed: Cardiac Anatomy;Signs and Symptoms;Emergency Plan;Home Exercise;Warm up/cool down;FITT Principles;BP/HR Reponse to exercise;Benefits of Exercise;End point of exercise            Exercise Follow-up/Discharge  Follow up/Discharge: Discussed home walking program NUTRITION  Nutrition Assessment: Initial    Nutrition Risk Factors:  Nutrition Risk Factors: Diabetes;Dyslipidemia  HbA1c: 6.2  Monitors blood sugar at home: No  Cholesterol: 197  LDL: 121  HDL: 48  Triglycerides: 139    Nutrition Plan  Interventions  Other Nutrition Intervention: Diet Class;Therapist/Pt Discussion;Educational Videos;Provide with Written Material    Education  "Completed  Nutrition Education Completed: Risk factor overview    Goals  Nutrition Goals (Next 30 days): Patient will follow a low saturated fat diet;Patient will follow a low sodium diet;Patient can identify their risk factors for CAD    Goals Met  Nutrition Goals Met: Completed Nutritional Risk Screen;Provided Rate your Plate Survey;Reviewed Dietitian schedule    Height, Weight, and  BMI  Weight: 131 lb (59.4 kg)  Height: 5' 3\" (1.6 m)  BMI: 23.21    Nutrition Follow-up  Follow-up/Discharge: Enc pt to return diet survey and consult with the dietitian       Other Risk Factors  Other Risk Factor Assessment: Initial    HTN Risk Factor: Hypertension    Pre Exercise BP: 110/70  Post Exercise BP: 96/60    Hypertension Plan  Goals  HTN Goals: Follow low sodium diet;Take medication as prescribed;Exercises regularly    Goals Met  HTN Goals Met: Take medication as prescribed    HTN Interventions  HTN Interventions: Diet consult;Therapist/patient discussion;Provide written material;Offer educational videos;Offer educational classes    HTN Education Completed  HTN Education Completed: Risk factor overview    Tobacco Risk Factor: NA    Risk Factor Follow-up   Follow-up/Discharge: Offer support and education for risk factor management PSYCHOSOCIAL  Psychosocial Assessment: Initial    Psychosocial Risk Factor: Stress    Psychosocial Plan  Interventions  Interventions: Offer educational videos and classes;Provide written material;Individual education and counseling    Education Completed  Education Completed: Effects of stress on body    Goals  Goals (Next 30 days): Identify stressors;Improvement in [employer] CO[employer] score;Practicing stress management skills    Goals Met  Goals Met: Identified Support system;Oriented to stress management classes    Psychosocial Follow-up  Follow-up/Discharge: Reviewed effects of stress           Patient involved in Goal setting?: Yes    Signature: " _____________________________________________________________    Date: __________________    Time: __________________See Doc Flowsheet

## 2021-06-15 NOTE — PROGRESS NOTES
Assessment & Plan     Kulwant was seen today for follow-up.    Diagnoses and all orders for this visit:    Right pontine stroke (H)    Type 2 diabetes mellitus treated without insulin (H)  -     metFORMIN (GLUCOPHAGE) 850 MG tablet; Take 1 tablet (850 mg total) by mouth 2 (two) times a day with meals.    Dizziness  -     meclizine (ANTIVERT) 25 mg tablet; Take 1 tablet (25 mg total) by mouth 3 (three) times a day as needed for dizziness.    Acute pericarditis, unspecified type  -     colchicine 0.6 mg tablet; TAKE 1 TABLET (0.6 MG TOTAL) BY MOUTH DAILY    GERD (gastroesophageal reflux disease)  -     sucralfate (CARAFATE) 1 gram tablet; Take 1 tablet (1 g total) by mouth 4 (four) times a day before meals and at bedtime. Take 1 hour prior to meals                 Return in about 3 weeks (around 3/5/2021) for stroke.    Avi Hankins MD  [facility]   Kulwant Amin is 51 y.o. and presents today for the following health issues   HPI     She had a pontine stroke with dizziness in January.  Meclizine has not been helping.    She went to ER yesterday for dizziness.  MR and CTA were normal.  Labs normal.    She needs a refill of her meds.    She has been taking colchicine twice daily.      Current Outpatient Medications   Medication Sig Dispense Refill     acetaminophen (TYLENOL) 500 MG tablet Take 2 tablets (1,000 mg total) by mouth every 8 (eight) hours as needed for pain. 90 tablet 10     albuterol (PROVENTIL) 2.5 mg /3 mL (0.083 %) nebulizer solution Take 3 mL (2.5 mg total) by nebulization every 6 (six) hours as needed for wheezing. 150 mL 12     albuterol (VENTOLIN HFA) 90 mcg/actuation inhaler Inhale 2 puffs every 6 (six) hours as needed for wheezing. 1 Inhaler 12     amitriptyline (ELAVIL) 10 MG tablet TAKE 2 TABLETS (20 MG TOTAL) BY MOUTH AT BEDTIME. 180 tablet 3     aspirin 325 MG tablet Take 1 tablet (325 mg total) by mouth daily with breakfast. 30 tablet 11      "atorvastatin (LIPITOR) 80 MG tablet TAKE 1 TABLET (80 MG TOTAL) BY MOUTH AT BEDTIME FOR CHOLESTEROL 30 tablet 3     BD ULTRA-FINE SHORT PEN NEEDLE 31 gauge x 5/16\" Ndle see administration instructions.       blood glucose meter (GLUCOMETER) Use 1 each As Directed 3 (three) times a day. Dispense glucometer brand per patient's insurance at pharmacy discretion.(E11.9) 1 each 0     blood glucose test strips Use 1 each As Directed 3 (three) times a day. Dispense brand per patient's insurance at pharmacy discretion.(E11.9) 300 strip 3     clopidogreL (PLAVIX) 75 mg tablet TAKE 1 TABLET (75 MG TOTAL) BY MOUTH DAILY. 90 tablet 1     colchicine 0.6 mg tablet TAKE 1 TABLET (0.6 MG TOTAL) BY MOUTH DAILY 90 tablet 3     ferrous sulfate 325 (65 FE) MG tablet Take 1 tablet (325 mg total) by mouth daily with breakfast. 90 tablet 3     flash glucose scanning reader (FREESTYLE MONICA 14 DAY READER) Misc Use 1 Units As Directed every 8 (eight) hours. 1 each 0     flash glucose sensor (FREESTYLE MONICA 14 DAY SENSOR) Kit Use 1 Units As Directed every 14 (fourteen) days. 6 kit 3     fluticasone furoate-vilanteroL (BREO ELLIPTA) 200-25 mcg/dose DsDv inhaler Inhale 1 puff daily. 1 each 12     gabapentin (NEURONTIN) 300 MG capsule TAKE 1 CAPSULE (300 MG TOTAL) BY MOUTH 2 (TWO) TIMES A DAY FOR NERVE PAIN 180 capsule 3     generic lancets Use 1 each As Directed 3 (three) times a day. Dispense brand per patient's insurance at pharmacy discretion.(E11.9) 300 each 3     hydroCHLOROthiazide (MICROZIDE) 12.5 mg capsule TAKE 1 CAPSULE (12.5 MG TOTAL) BY MOUTH DAILY FOR BLOOD PRESSURE 90 capsule 2     INJECT EASE LANCETS 30 gauge Misc USE 1 EACH AS DIRECTED THREE TIMES A DAY *34 DAYS* 100 each 11     insulin aspart U-100 (NOVOLOG) 100 unit/mL injection Inject under the skin 3 (three) times a day with meals. Use per sliding scale < 150 - No insulin, 151-200 use 3 U, 201- 250 use 5 U,   251- 300 use 8 U        insulin syringe-needle U-100 0.3 mL 30 x " "3/8\" Syrg Use as needed sliding scale insulin as directed. 100 Syringe 0     meclizine (ANTIVERT) 25 mg tablet Take 1 tablet (25 mg total) by mouth 4 (four) times a day. 28 tablet 0     meclizine (ANTIVERT) 25 mg tablet Take 1 tablet (25 mg total) by mouth 3 (three) times a day as needed for dizziness. 28 tablet 0     metFORMIN (GLUCOPHAGE) 850 MG tablet Take 1 tablet (850 mg total) by mouth 2 (two) times a day with meals. 60 tablet 11     metoprolol tartrate (LOPRESSOR) 25 MG tablet TAKE 1 TABLET (25 MG TOTAL) BY MOUTH 2 (TWO) TIMES A DAY FOR BLOOD PRESSURE 60 tablet 3     montelukast (SINGULAIR) 10 mg tablet TAKE 1 PILL (10 MG TOTAL) BY MOUTH AT BEDTIME FOR ASTHMA 30 tablet 10     omeprazole (PRILOSEC) 40 MG capsule Take 40 mg by mouth daily before breakfast.       polyethylene glycol (GLYCOLAX) 17 gram/dose powder MIX 17 GRAMS WITH LIQUID AND DRINK ONCE DAILY. Hold for loose stools. 510 g 12     predniSONE (DELTASONE) 10 mg tablet 40 mg x 3, 30 mg x 3, 20 mg x 3, 10 mg x 3.. 30 tablet 0     sucralfate (CARAFATE) 1 gram tablet Take 1 tablet (1 g total) by mouth 4 (four) times a day before meals and at bedtime. Take 1 hour prior to meals 120 tablet 11     tiotropium (SPIRIVA) 18 mcg inhalation capsule Place 1 capsule (2 puffs total) into inhaler and inhale daily. Place 1 capsule into the inhaler device. Close and press button to crush the capsule. Inhale the contents of the crushed capsule in 2 breaths. 30 capsule 11     No current facility-administered medications for this visit.          Review of Systems  No fever or cough.      Objective    /72 (Patient Site: Right Arm, Patient Position: Sitting, Cuff Size: Adult Regular)   Pulse (!) 108   Temp 97.8  F (36.6  C) (Oral)   Wt 131 lb 4 oz (59.5 kg)   SpO2 100%   BMI 23.25 kg/m    Body mass index is 23.25 kg/m .  Physical Exam  Heart normal  Lungs normal              "

## 2021-06-15 NOTE — PROGRESS NOTES
Assessment/Plan:    50 y.o.-year-old woman with history of organic fuel exposure in the home; visit was conducted through a [place] .  Her PFTs have been previously noted not to be diagnostic due to poor effort, additionally, she has iron deficiency with significant anemia and is not entirely clear how significantly her anemia is contributing to her shortness of breath.  Is experiencing yet another flare and on review of her last CT scan it is apparent that the debris noted in her airways has now migrated to different location and is likely secondary to some aspiration.  (Patient has previously had normal swallow evaluation).  Notably, her transthoracic echocardiogram and BNP as recently as December 2019 were noted to be unremarkable.  For the present we would recommend;    Continue Breo Ellipta 1 puff daily.  She knows to gargle after using this.    Continue Spiriva.    Continue to use 3 times daily albuterol nebs.    I have given her a prednisone burst for 5 days in addition to doxycycline for 5 days to see if this will help with her symptoms.  She has been instructed to resume her 2.5 mg of prednisone daily once her steroid burst is complete.    Continue Protonix 40 mg daily.    She is up-to-date with her influenza vaccination.    Follow-up in 4 weeks.  I reminded her daughter-in-law again to call me if she is not feeling well.    She has some minimal bibasilar bronchiectasis and we may have to consider initiating her on airway clearance therapy (given her worsening wheezing I hesitate to initiate her Mucomyst presently).    Tamra Duong  Pulmonary and Critical Care  [id number]      Subjective:    Patient ID: Ms. Amin is a 49 y.o.female with a past medical history significant for anxiety, arthritis, questionable asthma versus COPD, diabetes, hypertension and a prior history of SVT presents with a follow-up visit for her pulmonary complaints.  She follows with me fairly closely for her  moderate-persistent, difficult to control asthma symptoms.  Since her last clinic visit, she has developed a cough which began about a week ago when associated with worsening shortness of breath.  While her cough sounds quite wet, she finds that it is not productive.  She denies fevers or chills and does state that the cold weather has made her breathing even harder than baseline.  She continues to be compliant with her Breo and Spiriva inhalers and is using scheduled albuterol nebulizers 3 times a day in addition to as needed albuterol.  She has continued to be compliant with her daily low-dose prednisone (2.5 mg) with some minimal improvement in her breathing.    Pertinent past medical history:  48-year-old female here for follow-up.  Her breathing is a bit worse than 6 months ago.  She had multiple ER visits and evaluations.  This includes negative PE study.  She had a cath with a 75% LAD lesion which was intervened upon.  Since her catheterization she feels she has more heartburn.  Her breathing is worse with exertion.  Improves with rest.  It is also worse with cold weather.  Warm weather and humid air does not bother.  Symptoms localized to the chest.  Pertinent positives include burning sensation in the chest.  Pertinent negatives include no fever or hemoptysis.    Current Outpatient Medications   Medication Sig Dispense Refill     acetaminophen (TYLENOL) 500 MG tablet Take 2 tablets (1,000 mg total) by mouth every 8 (eight) hours as needed for pain. 90 tablet 10     albuterol (PROVENTIL) 2.5 mg /3 mL (0.083 %) nebulizer solution Take 3 mL (2.5 mg total) by nebulization every 6 (six) hours as needed for wheezing. 150 mL 12     albuterol (VENTOLIN HFA) 90 mcg/actuation inhaler Inhale 2 puffs every 6 (six) hours as needed for wheezing. 1 Inhaler 12     amitriptyline (ELAVIL) 10 MG tablet TAKE 2 TABLETS (20 MG TOTAL) BY MOUTH AT BEDTIME. 180 tablet 3     aspirin 325 MG tablet Take 1 tablet (325 mg total) by  "mouth daily with breakfast. 30 tablet 11     aspirin 81 MG EC tablet 1 tablet daily.       atorvastatin (LIPITOR) 80 MG tablet TAKE 1 TABLET (80 MG TOTAL) BY MOUTH AT BEDTIME FOR CHOLESTEROL 30 tablet 3     BD ULTRA-FINE SHORT PEN NEEDLE 31 gauge x 5/16\" Ndle see administration instructions.       blood glucose meter (GLUCOMETER) Use 1 each As Directed 3 (three) times a day. Dispense glucometer brand per patient's insurance at pharmacy discretion.(E11.9) 1 each 0     blood glucose test strips Use 1 each As Directed 3 (three) times a day. Dispense brand per patient's insurance at pharmacy discretion.(E11.9) 300 strip 3     clopidogreL (PLAVIX) 75 mg tablet TAKE 1 TABLET (75 MG TOTAL) BY MOUTH DAILY. 90 tablet 1     colchicine 0.6 mg tablet TAKE 1 TABLET (0.6 MG TOTAL) BY MOUTH DAILY 90 tablet 3     ferrous sulfate 325 (65 FE) MG tablet Take 1 tablet (325 mg total) by mouth daily with breakfast. 90 tablet 3     flash glucose scanning reader (FREESTYLE MONICA 14 DAY READER) Misc Use 1 Units As Directed every 8 (eight) hours. 1 each 0     flash glucose sensor (FREESTYLE MONICA 14 DAY SENSOR) Kit Use 1 Units As Directed every 14 (fourteen) days. 6 kit 3     fluticasone furoate-vilanteroL (BREO ELLIPTA) 200-25 mcg/dose DsDv inhaler Inhale 1 puff daily. 1 each 12     gabapentin (NEURONTIN) 300 MG capsule TAKE 1 CAPSULE (300 MG TOTAL) BY MOUTH 2 (TWO) TIMES A DAY FOR NERVE PAIN 180 capsule 3     generic lancets Use 1 each As Directed 3 (three) times a day. Dispense brand per patient's insurance at pharmacy discretion.(E11.9) 300 each 3     hydroCHLOROthiazide (MICROZIDE) 12.5 mg capsule TAKE 1 CAPSULE (12.5 MG TOTAL) BY MOUTH DAILY FOR BLOOD PRESSURE 90 capsule 2     INJECT EASE LANCETS 30 gauge [facility] USE 1 EACH AS DIRECTED THREE TIMES A DAY *34 DAYS* 100 each 11     insulin aspart U-100 (NOVOLOG) 100 unit/mL injection Inject under the skin 3 (three) times a day with meals. Use per sliding scale < 150 - No insulin, 151-200 use " "3 U, 201- 250 use 5 U,   251- 300 use 8 U        insulin syringe-needle U-100 0.3 mL 30 x 3/8\" Syrg Use as needed sliding scale insulin as directed. 100 Syringe 0     meclizine (ANTIVERT) 25 mg tablet Take 1 tablet (25 mg total) by mouth 4 (four) times a day. 28 tablet 0     meclizine (ANTIVERT) 25 mg tablet Take 1 tablet (25 mg total) by mouth 3 (three) times a day as needed for dizziness. 28 tablet 0     metFORMIN (GLUCOPHAGE) 850 MG tablet Take 1 tablet (850 mg total) by mouth 2 (two) times a day with meals. 60 tablet 11     metoprolol tartrate (LOPRESSOR) 25 MG tablet TAKE 1 TABLET (25 MG TOTAL) BY MOUTH 2 (TWO) TIMES A DAY FOR BLOOD PRESSURE 60 tablet 3     montelukast (SINGULAIR) 10 mg tablet TAKE 1 PILL (10 MG TOTAL) BY MOUTH AT BEDTIME FOR ASTHMA 30 tablet 10     omeprazole (PRILOSEC) 40 MG capsule Take 40 mg by mouth daily before breakfast.       polyethylene glycol (GLYCOLAX) 17 gram/dose powder MIX 17 GRAMS WITH LIQUID AND DRINK ONCE DAILY. Hold for loose stools. 510 g 12     sucralfate (CARAFATE) 1 gram tablet Take 1 tablet (1 g total) by mouth 4 (four) times a day before meals and at bedtime. Take 1 hour prior to meals 120 tablet 11     tiotropium (SPIRIVA) 18 mcg inhalation capsule Place 1 capsule (2 puffs total) into inhaler and inhale daily. Place 1 capsule into the inhaler device. Close and press button to crush the capsule. Inhale the contents of the crushed capsule in 2 breaths. 30 capsule 11     predniSONE (DELTASONE) 2.5 MG tablet 1 tablet daily.       No current facility-administered medications for this visit.        /66   Pulse 81   Wt 133 lb 11.2 oz (60.6 kg)   SpO2 97% Comment: RA  BMI 23.68 kg/m      Physical Exam   Constitutional: She is oriented to person, place, and time. She appears well-developed and well-nourished.   HENT:   Head: Normocephalic.   Eyes: Pupils are equal, round, and reactive to light.   Neck: Normal range of motion.   Cardiovascular:   Sinus Tachycardia.  S1 " and S2 audible.   Pulmonary/Chest: Effort normal. She has wheezes.   Abdominal: Soft.   Musculoskeletal: Normal range of motion.         General: No edema.   Neurological: She is alert and oriented to person, place, and time.   Skin: Skin is warm.   Psychiatric: She has a normal mood and affect.         Tamra Duong MD  Pulmonary and Critical Care  (p) [phone]

## 2021-06-15 NOTE — PROGRESS NOTES
Medication Therapy Management (MTM) Encounter  Assessment:                                                      1. S/P coronary artery stent placement/Right pontine stroke (H)  Now appropriately taking DAPT, needing refill of high intensity statin, will send refill today. No changes recommended.     2. Type 2 diabetes mellitus treated without insulin (H)  Patient appropriately using medications and CGM as prescribed, recommend follow up A1c in April. If uncontrolled to goal of less than 7%, would recommend dose increase of metformin and considering additional therapy such as DPP4 or sulfonylurea.    3. Essential hypertension  BP at goal of less than <140/90, no changes recommended today.     4. Gastroesophageal reflux disease without esophagitis  Patient appropriately taking medications as prescribed by gastroenterologist, PPI and sucralfate, recommend continuation without change today.  Of note, patient's gastrointestinal symptoms may be related to chronic use of colchicine, managed by cardiology for pericarditis.    5. Moderate persistent asthma, unspecified whether complicated  Patient following with pulmonologist, appropriately using inhalers as prescribed and continuing chronic oral steroid, though concern for chronic steroid use and hyperglycemia.  If possible, would recommend discontinuing prednisone or lowest effective oral steroid dose.    Plan:                                                     1. Patient to refill miralax, meclizine, and colchicine at pharmacy  2. Sent refill of atorvastatin for continuation of therapy    Follow Up  Return in about 8 weeks (around 5/6/2021) for Medication Review.  Pulmo 4/22; PCP 5/6  Subjective & Objective                                                       Kulwant Amin is a 51 y.o. female called for follow up MTM visit. she was discharged from [facility] on 1/15/21 for Right pontine stroke. Professional telephonic French  utilized today, also spoke with  patients daughter in law today to review medications.     Chief Complaint: Follow Up from MTM visit on 1/20/21  Medication Adherence/Access: HHN sets up pillbox every week. Takes medications four times a day, does not forget medication doses. Family helps her remember to take the medications. Reviewed medications virtually with daughter in law today. Gets medication refills by going to pharmacy.     Right Pontine Stroke: Atorvastatin 80 mg daily, aspirin 325 mg daily, clopidogrel 75 mg daily, metoprolol tartrate 25 mg two times a day. Cardiology - Del Hirsch MD. Denies any medication side effects. Taking tylenol 1,000 mg every 6 hours as needed, has not been very helpful for her.   Pulse Readings from Last 3 Encounters:   03/11/21 75   03/05/21 80   02/25/21 81     Lab Results   Component Value Date    HGB 14.6 03/05/2021     T2DM: metformin 850 mg twice daily and Novolog sliding scale insulin when needed for high BG, only using when the BG is very high - last dose was about a month ago.  Denies any low BG. Denies any medication side effects.  SMBG: Now using Freestyle Gemma CGM for monitoring BG. BG have been checked three times a day, they have been fine. 108 -190.   Neuropathy: gabapentin 300 mg two times a day. This has been working for her.   Lab Results   Component Value Date    HGBA1C 8.5 (H) 01/05/2021    HGBA1C 7.5 (H) 10/06/2020    HGBA1C 6.3 (H) 06/24/2020     Lab Results   Component Value Date    MICROALBUR <0.50 01/16/2020    LDLCALC 52 01/14/2021    CREATININE 0.64 02/11/2021     HTN: HCTZ 12.5 mg daily in the AM and metoprolol tartrate 25 mg two times a day. Denies any falls.   Requesting refill of meclizine 25 mg three times a day for dizziness, rx recently sent per PCP. This does help with her dizziness.  BP Readings from Last 3 Encounters:   03/11/21 110/60   03/05/21 108/68   02/25/21 104/66     Pulse Readings from Last 3 Encounters:   03/11/21 75   03/05/21 80   02/25/21 81     Lab  Results   Component Value Date    CREATININE 0.64 2021     GERD: omeprazole 40 mg daily and sucralfate 1 g four times a day. GI recommended continuing PPI and follow up GI clinic.   Impression from EGD 12/3/20 - mild antral erosions. Still having symptoms of acid reflux/heartburn every day.    Ref. Range 2021 17:30   Magnesium Latest Ref Range: 1.8 - 2.6 mg/dL 1.9     Asthma:  montelukast 10 mg daily, Breo Ellipta 1 puff daily, spiriva once daily, albuterol inhaler 3-4 times a day as needed, prednisone 2.5 mg daily for asthma per pulmonologist. Sees Dr. Duong, pulmonologist f/u was on 3/11. States that her breathing has been fine.  Appropriately using ICS/LABA, LAMA, and EVONNE.   Visit 3/11/21 - Tamra Duong, Pulmonary and Critical Care    Continue Breo Ellipta 1 puff daily.  She knows to gargle after using this.    Continue Spiriva.    Continue to use 3 times daily albuterol nebs.    Continue 2.5 mg of prednisone daily once her steroid burst is complete.    Continue Protonix 40 mg daily.    Pericarditis: colchicine 0.6 mg daily - requesting refill today - has been out of this for a while. Last Authorized by: FAUSTINO ISSA  f/u 21.   No results found for: URICACID    PMH: reviewed in EPIC   Allergies/ADRs: reviewed in EPIC   Alcohol: Pt reports none  Tobacco:   Social History     Tobacco Use   Smoking Status Former Smoker     Types: Cigarettes     Quit date:      Years since quittin.2   Smokeless Tobacco Never Used   Tobacco Comment    stoped 15 yrs ago     Recent Vitals:   BP Readings from Last 3 Encounters:   21 110/60   21 108/68   21 104/66      Wt Readings from Last 3 Encounters:   21 121 lb (54.9 kg)   21 130 lb 6 oz (59.1 kg)   21 133 lb 11.2 oz (60.6 kg)     ----------------  The patient declined an after visit summary    I spent 45 minutes with this patient today;  . All changes were made via collaborative practice agreement with Nani,  "MD Adrien. A copy of the visit note was provided to the patient's provider.     Malu Lane), PharmD, BCACP  Medication Therapy Management Pharmacist  [facility]    Telemedicine Visit Details    Type of service:  Telephone     Start Time: 9:00 AM  End Time (time video/phone call stopped): 9:45 AM    Originating Location (pt. Location): Home    Distant Location (provider location):  [facility] MEDICATION THERAPY MANAGEMENT [facility]IRTUALLY    Mode of Communication:   Telephone       Current Outpatient Medications   Medication Sig Dispense Refill     acetaminophen (TYLENOL) 500 MG tablet Take 2 tablets (1,000 mg total) by mouth every 8 (eight) hours as needed for pain. 90 tablet 10     albuterol (PROVENTIL) 2.5 mg /3 mL (0.083 %) nebulizer solution Take 3 mL (2.5 mg total) by nebulization every 6 (six) hours as needed for wheezing. 150 mL 12     albuterol (VENTOLIN HFA) 90 mcg/actuation inhaler Inhale 2 puffs every 6 (six) hours as needed for wheezing. 1 Inhaler 12     amitriptyline (ELAVIL) 10 MG tablet TAKE 2 TABLETS (20 MG TOTAL) BY MOUTH AT BEDTIME. 180 tablet 3     aspirin 325 MG tablet Take 1 tablet (325 mg total) by mouth daily with breakfast. 30 tablet 11     aspirin 81 MG EC tablet 1 tablet daily.       atorvastatin (LIPITOR) 80 MG tablet TAKE 1 TABLET (80 MG TOTAL) BY MOUTH AT BEDTIME FOR CHOLESTEROL 30 tablet 12     BD ULTRA-FINE SHORT PEN NEEDLE 31 gauge x 5/16\" Ndle see administration instructions.       blood glucose meter (GLUCOMETER) Use 1 each As Directed 3 (three) times a day. Dispense glucometer brand per patient's insurance at pharmacy discretion.(E11.9) 1 each 0     blood glucose test strips Use 1 each As Directed 3 (three) times a day. Dispense brand per patient's insurance at pharmacy discretion.(E11.9) 300 strip 3     clopidogreL (PLAVIX) 75 mg tablet TAKE 1 TABLET (75 MG TOTAL) BY MOUTH DAILY. 90 tablet 1     colchicine 0.6 mg tablet TAKE 1 TABLET (0.6 MG TOTAL) " "BY MOUTH DAILY 90 tablet 0     ferrous sulfate 325 (65 FE) MG tablet Take 1 tablet (325 mg total) by mouth daily with breakfast. 90 tablet 3     flash glucose scanning reader (FREESTYLE MONICA 14 DAY READER) Misc Use 1 Units As Directed every 8 (eight) hours. 1 each 0     flash glucose sensor (FREESTYLE MONICA 14 DAY SENSOR) Kit Use 1 Units As Directed every 14 (fourteen) days. 6 kit 3     fluticasone furoate-vilanteroL (BREO ELLIPTA) 200-25 mcg/dose DsDv inhaler Inhale 1 puff daily. 1 each 12     gabapentin (NEURONTIN) 300 MG capsule TAKE 1 CAPSULE (300 MG TOTAL) BY MOUTH 2 (TWO) TIMES A DAY FOR NERVE PAIN 180 capsule 3     generic lancets Use 1 each As Directed 3 (three) times a day. Dispense brand per patient's insurance at pharmacy discretion.(E11.9) 300 each 3     hydroCHLOROthiazide (MICROZIDE) 12.5 mg capsule TAKE 1 CAPSULE (12.5 MG TOTAL) BY MOUTH DAILY FOR BLOOD PRESSURE 90 capsule 2     INJECT EASE LANCETS 30 gauge [facility] USE 1 EACH AS DIRECTED THREE TIMES A DAY *34 DAYS* 100 each 11     insulin aspart U-100 (NOVOLOG) 100 unit/mL injection Inject under the skin 3 (three) times a day with meals. Use per sliding scale < 150 - No insulin, 151-200 use 3 U, 201- 250 use 5 U,   251- 300 use 8 U        insulin syringe-needle U-100 0.3 mL 30 x 3/8\" Syrg Use as needed sliding scale insulin as directed. 100 Syringe 0     meclizine (ANTIVERT) 25 mg tablet Take 1 tablet (25 mg total) by mouth 3 (three) times a day as needed for dizziness. 28 tablet 0     metFORMIN (GLUCOPHAGE) 850 MG tablet Take 1 tablet (850 mg total) by mouth 2 (two) times a day with meals. 60 tablet 11     metoprolol tartrate (LOPRESSOR) 25 MG tablet TAKE 1 TABLET (25 MG TOTAL) BY MOUTH 2 (TWO) TIMES A DAY FOR BLOOD PRESSURE 60 tablet 3     montelukast (SINGULAIR) 10 mg tablet TAKE 1 PILL (10 MG TOTAL) BY MOUTH AT BEDTIME FOR ASTHMA 30 tablet 10     nystatin (MYCOSTATIN) 100,000 unit/mL suspension Take 5 mL (500,000 Units total) by mouth 4 (four) times " a day for 10 days. 200 mL 0     omeprazole (PRILOSEC) 40 MG capsule Take 40 mg by mouth daily before breakfast.       polyethylene glycol (GLYCOLAX) 17 gram/dose powder MIX 17 GRAMS WITH LIQUID AND DRINK ONCE DAILY. Hold for loose stools. 510 g 12     predniSONE (DELTASONE) 2.5 MG tablet 1 tablet daily.       sucralfate (CARAFATE) 1 gram tablet Take 1 tablet (1 g total) by mouth 4 (four) times a day before meals and at bedtime. Take 1 hour prior to meals 120 tablet 11     tiotropium (SPIRIVA) 18 mcg inhalation capsule Place 1 capsule (2 puffs total) into inhaler and inhale daily. Place 1 capsule into the inhaler device. Close and press button to crush the capsule. Inhale the contents of the crushed capsule in 2 breaths. 30 capsule 11     No current facility-administered medications for this visit.         Medication Therapy Recommendations  Moderate persistent asthma    Current Medication: predniSONE (DELTASONE) 2.5 MG tablet ()   Rationale: Undesirable effect - Adverse medication event - Safety   Recommendation: Discontinue Medication - alternative to oral steroid?   Status: No Longer Relevant   Note: Provider never responded, per note pt to continue steroid 2.5 mg daily.         Right pontine stroke (H)    Current Medication: atorvastatin (LIPITOR) 80 MG tablet   Rationale: Preventive therapy - Needs additional medication therapy - Indication   Recommendation: Start Medication - refill for continuation   Status: Accepted per CPA

## 2021-06-15 NOTE — PROGRESS NOTES
ASSESMENT AND PLAN:  Diagnoses and all orders for this visit:    Type 2 diabetes mellitus treated without insulin (H)  Patient forgot to bring blood sugar log.  Daughter-in-law states her blood sugar in 120s to 140s.  Last A1c was 8.1 in 1/2021.  No med changes today.    Anemia, unspecified type  -     Hemoglobin  Will discontinue iron supplement if hemoglobin is normal.    Dizziness  -     meclizine (ANTIVERT) 25 mg tablet; Take 1 tablet (25 mg total) by mouth 3 (three) times a day as needed for dizziness.  Dispense: 28 tablet; Refill: 0    S/P coronary artery stent placement  Continue aspirin Plavix and statin.    Right pontine stroke (H)  Saw neurology last month, recommended as needed follow-up.    Moderate persistent asthma without complication  Advised to keep appointment with pulmonology next week.    Other migraine without status migrainosus, not intractable  Continue amitriptyline.  No acute headache.      This transcription uses voice recognition software, which may contain typographical errors.      SUBJECTIVE: Kulwant Amin is a 51-year-old female with multiple chronic medical conditions including but not limited to diabetes, coronary artery disease s/p stent placement, COPD, asthma, hypertension, anxiety/depression and migraine headaches here for follow-up.  She went to the ED on 2/11/2021 with dizziness.  Once found to have CVA right pontine.  She had neurology follow-up on 2/19/2021, note reviewed.  Recommended aspirin and Plavix indefinitely and follow-up with neurology as needed.  She is still having left-sided weakness.  She is receiving home physical therapy 3 times a week for daughter-in-law.  No acute weaknesses since hospital discharge.    She sees cardiology and pulmonology regularly also.  She is still having some dizziness but improving slowly.  Appetite is improving but very slowly.  She is still having constipation.  Her breathing is at baseline, has appointment with pulmonology next week.   She recently completed steroid burst and doxycycline.      Past Medical History:   Diagnosis Date     Acute asthma exacerbation 2020     Acute blood loss anemia      Anxiety      Arthritis      Asthma      Asthma exacerbation 2015     Chest wall pain 2017    Added automatically from request for surgery [id number]     COPD (chronic obstructive pulmonary disease) (H)      Coronary artery disease due to lipid rich plaque 2018     Depression      Diabetes mellitus, type II (H)      Essential hypertension 2017     Generalized headaches      GERD (gastroesophageal reflux disease)      History of anesthesia complications     nausea and vomiting     Lactic acid acidosis 2018     Lower GI bleeding      Nonspecific chest pain 2018    2 negative stress tests since coronary stenting in ; in 2020 she had 1 week of this pain with normal cardiac enzymes and relief with Toradol     Pneumonia      SVT (supraventricular tachycardia) (H)      Patient Active Problem List   Diagnosis     GERD (gastroesophageal reflux disease)     Essential hypertension     Coronary artery disease due to lipid rich plaque     Type 2 diabetes mellitus treated without insulin (H)     Moderate persistent asthma     Migraine headache     Hyperlipidemia     Nonspecific chest pain     SOB (shortness of breath)     Microcytic anemia     Latent tuberculosis     Dizziness     Chronic nonintractable headache, unspecified headache type     Chronic obstructive pulmonary disease, unspecified COPD type (H)     Bronchiectasis (H)     Cataracts, bilateral     Dental decay     Immigrant with language difficulty     Anxiety     Chest pain     Pneumonia     Moderate major depression (H)     Right pontine stroke (H)       Allergies:  No Known Allergies    Social History     Tobacco Use   Smoking Status Former Smoker     Types: Cigarettes     Quit date:      Years since quittin.1   Smokeless Tobacco Never Used   Tobacco  "Comment    stoped 15 yrs ago       Review of systems otherwise negative except as listed in HPI.   Social History     Tobacco Use   Smoking Status Former Smoker     Types: Cigarettes     Quit date:      Years since quittin.1   Smokeless Tobacco Never Used   Tobacco Comment    stoped 15 yrs ago       OBJECTICE: /68   Pulse 80   Temp 98.1  F (36.7  C) (Oral)   Resp 20   Ht 5' 3\" (1.6 m)   Wt 130 lb 6 oz (59.1 kg)   BMI 23.09 kg/m      DATA REVIEWED:  Additional History from Old Records Summarized (2):   Radiology Tests Summarized or Ordered (1):   Labs Reviewed or Ordered (1):       GEN-alert,  in no apparent distress.  HEENT-mucous membranes are moist, neck is supple.  CV-regular rate and rhythm with no murmur.   RESP-lungs clear to auscultation .  ABDOMEN- Soft , not tender.  EXTREM- No edema.  SKIN-normal        Adrien Cardoza   3/5/2021   "

## 2021-06-15 NOTE — TELEPHONE ENCOUNTER
Call, requesting something to help with her cough and sneezing. Cough medication to be ordered per Dr. Duong and instructed patient to continue her daily prednisone. Also instructed to call with any questions or concerns.

## 2021-06-15 NOTE — TELEPHONE ENCOUNTER
Son called to say she is having some wheezing and coughing and some shortness of breath.  Will give prednisone taper per Dr. Almendarez. Instructed to sy if no improvement and to go to the Ed to be evaluated if  Symptoms get worse.

## 2021-06-15 NOTE — PROGRESS NOTES
Kulwant Amin  [address] [address]  [phone] (home)     Primary cardiologist:  Dr. Hirsch  PCP:  Adrien Cardoza MD  Procedure:  Coronary Angiogram With Possible Coronary Intervention  H&P completed by:  [place] 1/23/18  Case MD:  Dr. Serrano or Dr. Manley  Admit date and time:  1/25/18 @ 07:00  Case start time:  10:00  Ordering MD:  Dr. Hirsch  Diagnosis:  Abnormal CT Coronary Angiogram  Anticoagulation: None  CPAP: No  Bypass Grafts: No  Renal Issues: No  Allergies: NKA  Diabetic?: No  Device?: No     Angiogram Teaching    Reason for Visit:  Patient seen with [facility]  for pre-procedure education in preparation for:  Coronary Angiogram With Possible Coronary Intervention    Procedure Prep:  Cardiologist note dated: 1/7/18  EKG results obtained, dated: on admission  Pertinent test results obtained - Viewable in Epic, dated: 12/28/18 NM Stress Test, 1/11/18 CTA Coronary With Calcium Score  Hemogram results obtained: on admission  Basic Metabolic Panel results obtained: on admission  Lipid Profile results obtained: on admission    Patient Education  Explained indications/risks for diagnostic evaluation, including one or more of the following:  coronary angiogram, less than 0.1% of acute myocardial infarction and CVA or death or any of the following to the degree that it could threaten life:  allergic reaction, arrhythmia, renal failure, hemorrhage, thrombosis and infection  Explained indications/risks for therapeutic interventions, including one or more of the following: stent, 2% or less risk of MI, less than 1% risk of CVA, emergency heart surgery, death, less than 4 % risk of vascular injury requiring surgical repair or blood transfusion, 20-30% risk of restonosis with a bare metal stent and less than 10% risk of restonosis with a drug-eluting stent.  These risks are in addition to baseline risks associated with a Diagnostic Evaluation.  Patient states understanding of procedure and  risks and agrees to proceed.    Pre-procedure instructions  Patient instructed to be NPO after midnight.  Patient instructed to arrange for transportation home following procedure.  Patient instructed to have a responsible adult with them for 24 hours post-procedure.  Post-procedure follow up process.  Conscious sedation discussed.    Pre-procedure medication instructions  Patient instructed on antiplatelet medication.  Continue medications as scheduled, with a small amount of water on the day of the procedure unless indicated.  Patient instructed to take 325 mg of Aspirin am of procedure: No- Patient wishes to take it upon admission as she does not have any at home and does not want to take any medication wrong.  Other medication: instructed to hold all prescribed medications and over the counter medications a.m. of the procedure.    *PATIENTS RECORDS AVAILABLE IN [employer] UNLESS OTHERWISE INDICATED*    *Order set was entered on this date: 1/18/18      Patient Active Problem List   Diagnosis     Hyperglycemia     HTN (hypertension)     Insomnia     Pain     Muscle cramps     GERD (gastroesophageal reflux disease)     Prediabetes     SVT (supraventricular tachycardia)     Other chest pain     Hypokalemia     Leukocytosis     Chest pain     Asthma without status asthmaticus     Abnormal findings on diagnostic imaging of heart/coronary circulation       Current Outpatient Prescriptions   Medication Sig Dispense Refill     acetaminophen (TYLENOL) 500 MG tablet Take 500 mg by mouth every 6 (six) hours as needed for pain.       albuterol (PROAIR HFA;PROVENTIL HFA;VENTOLIN HFA) 90 mcg/actuation inhaler Inhale 2 puffs every 6 (six) hours as needed for wheezing. 1 Inhaler 2     albuterol (PROVENTIL) 2.5 mg /3 mL (0.083 %) nebulizer solution Take 3 mL (2.5 mg total) by nebulization every 6 (six) hours as needed for wheezing. 75 mL 12     amitriptyline (ELAVIL) 10 MG tablet Take 1 tablet (10 mg total) by mouth at bedtime. 30  tablet 5     blood glucose test Strp Use to test blood sugar up to 3 times daily.  Contour Next EZ. 50 strip 1     diphenhydrAMINE (BENADRYL) 25 mg tablet Take 1 tablet (25 mg total) by mouth bedtime as needed for sleep. 30 tablet 2     guaiFENesin (ROBITUSSIN) 100 mg/5 mL syrup Take 5 mL (100 mg total) by mouth 3 (three) times a day as needed for cough (DO NOT DRIVE OR OPERATE HEAVY MACHINERY WHILE ON ROBITUSSIN). 45 mL 0     hydroCHLOROthiazide (MICROZIDE) 12.5 mg capsule Take 1 capsule (12.5 mg total) by mouth daily. 90 capsule 1     lancets (MICROLET LANCET) Misc Use to test blood sugars up to 3 times daily. 100 each 0     metoprolol tartrate (LOPRESSOR) 25 MG tablet Take 1 tablet (25 mg total) by mouth 2 (two) times a day. 60 tablet 0     mometasone-formoterol (DULERA) 200-5 mcg/actuation HFAA inhaler Inhale 2 puffs 2 (two) times a day.       montelukast (SINGULAIR) 10 mg tablet Take 10 mg by mouth at bedtime.       omeprazole (PRILOSEC) 20 MG capsule Take 20 mg by mouth 2 (two) times a day before meals.        oxyCODONE-acetaminophen (PERCOCET) 7.5-325 mg per tablet Take 1 tablet by mouth every 4 (four) hours as needed for pain. 10 tablet 0     sucralfate (CARAFATE) 1 gram tablet Take 1 tablet (1 g total) by mouth 4 (four) times a day. 120 tablet 0     tiotropium bromide (SPIRIVA RESPIMAT) 2.5 mcg/actuation Mist Inhale 2 puffs daily. 4 g 4     No current facility-administered medications for this visit.        No Known Allergies    Plan  Patient's son and daughter-in-law will be  day of procedure and will stay with patient for 24 hours after.  Patient ready for procedure     Martine Bowman RN

## 2021-06-15 NOTE — PROGRESS NOTES
ASSESMENT AND PLAN:  Diagnoses and all orders for this visit:    Palpitation  No symptoms since hospital discharge.  Instructed to keep appointment for CT angiogram coronary tomorrow.    Prediabetes  -     Glucose  -     Glycosylated Hemoglobin A1c  A1c 6.2 with normal glucose today.  Recheck in 3 months.    SVT (supraventricular tachycardia)  Follow-up with cardiology as scheduled.    Asthma without status asthmaticus  No acute exacerbation.  Continue current medications.  No refill needed today.  She has follow-up appointment scheduled with me end of this month.      SUBJECTIVE: Kulwant Amin is here for hospital follow-up.  She was admitted from 1/6/2018 - 1/7/2018 with chest pain and palpitations.  Evaluation was unremarkable, cardiac cause was ruled out.  She was seen by cardiology while in the hospital.  She has CTA appointment scheduled tomorrow, 1/11/2018.  She was admitted last month with the same symptoms.  Carafate was added upon recent discharge.  She states that she is taking all her medications as prescribed including antidepressant.  She states she is feeling well after the hospital discharge.  No shortness of breath, chest pain or palpitations since hospital discharge.  She has been using her asthma medications as prescribed, no wheezing reported.  No new complaints today.    Past Medical History:   Diagnosis Date     Anxiety      Arthritis      Asthma      Back pain      COPD (chronic obstructive pulmonary disease)      Depression      Diabetes mellitus      Generalized headaches      HTN (hypertension)      Pneumonia      SVT (supraventricular tachycardia)      Patient Active Problem List   Diagnosis     Hyperglycemia     HTN (hypertension)     Insomnia     Pain     Muscle cramps     GERD (gastroesophageal reflux disease)     Prediabetes     SVT (supraventricular tachycardia)     Other chest pain     Hypokalemia     Leukocytosis     Chest pain     Asthma without status asthmaticus       Allergies:   "No Known Allergies    History   Smoking Status     Former Smoker     Quit date: 2003   Smokeless Tobacco     Never Used       Review of systems otherwise negative except as listed in HPI.   History   Smoking Status     Former Smoker     Quit date: 2003   Smokeless Tobacco     Never Used       OBJECTICE: /88 (Patient Site: Left Arm, Patient Position: Sitting, Cuff Size: Adult Regular)  Pulse 76  Temp 98.1  F (36.7  C) (Oral)   Resp 20  Ht 5' 3\" (1.6 m)  Wt 131 lb 6 oz (59.6 kg)  BMI 23.27 kg/m2        GEN-alert,  in no apparent distress.  HEENT-mucous membranes are moist, neck is supple.  CV-regular rate and rhythm with no murmur.   RESP-no wheezing today.  ABDOMEN- Soft , not tender.  SKIN-normal        [facility]   1/10/2018   This transcription uses voice recognition software, which may contain typographical errors.      "

## 2021-06-15 NOTE — TELEPHONE ENCOUNTER
Interpretor assisted call.    Mother having issues walking, bad HA and slurring words, needs to call 911.  Agrees.    Elizabeth Baird RN [facility] Triage Nurse Advisor    Reason for Disposition    Difficult to awaken or acting confused (e.g., disoriented, slurred speech)    Protocols used: NEUROLOGIC DEFICIT-A-OH

## 2021-06-15 NOTE — TELEPHONE ENCOUNTER
Reason for Call: medication refill    Do you use a [facility]?  No  Name of the pharmacy and phone number for the current request: [facility], Phone: [phone]    Name of the medication requested: -polyethylene glycol (GLYCOLAX) 17 gram/dose powder  -acetaminophen (TYLENOL) 500 MG tablet    Other request: Dtr in law called pharmacy on Friday and yesterday and was told pt has no more refills.    Can we leave a detailed message on this number? No call back needed    Phone number patient can be reached at: Cell number on file:    Telephone Information:   Mobile [phone]       Best Time: anytime    Call taken on 3/15/2021 at 8:45 AM by Azael Villegas

## 2021-06-15 NOTE — PROGRESS NOTES
Assessment/Plan:     1.  Coronary artery disease: Kulwant Amin is here for pre-procedure H & P for coronary angiogram with possible coronary intervention scheduled on 1/25/2018.  She was recently hospitalized in 1st week of January, 2018 with chest pain.  Her cardiac enzymes were negative but had an abnormal stress test and CT angiogram showed calcium score of 204 with proximal LAD lesion with severe luminal stenosis in the range of 70-99% noncalcified plaque detected indicating high risk for cardiac event in the next 10 years. Patient continues to have epigastric and precordial pain but has not worsened since discharged from the hospital.    Discussed about the indication for coronary angiogram/possible PCI and the risks associated with angiogram < 0.1% of acute MI and CVA or death or any of the following to the degree that it could threaten her life which includes allergic reaction, arrhythmia, renal failure, hemorrhage, thrombosis and infection.  Risk associated with therapeutic intervention includes 2% or less risk of MI, less than 1% risk of CVA, emergency her surgery, death, less than 4% risk of vascular injury requiring surgical repair or blood transfusion with 20-30% risk of restenosis with a bare-metal stent and less than 10% risk of stenosis with a drug-eluting stent.  Pre-angio EKG obtained-normal sinus rhythm heart rate in 70s with no new changes from previous EKG done in the hospital.  Her recent BMP, LFT, and CBC were unremarkable.     2.  Dyslipidemia: Her recent lipid profile showed cholesterol 200, triglyceride 203, HDL 42, and .  Currently not on any statin-deferred to interventional cardiology/ primary cardiology.    3.  Hypertension: Her blood pressure is well controlled-136/82.  On hydrochlorothiazide 12.5 mg daily.    4.  Pre-diabetes: Defer to primary care provider.  Recent A1c 6.2      5.  History of supraventricular tachycardia: No recurrent even during recent hospitalization.  She is  on metoprolol tartrate 25 mg twice a day.  Reports improvement in her heart palpitation.  She is scheduled to see Dr. Zarate on 1/26/2018.    6.  Asthma: Noted congested cough and expiratory wheezing on assessment-unchanged from baseline.  On albuterol inhaler, Dulera, Spiriva, and Singulair.  Denies recent fever, chills or fatigue. Reports uses 2-3 pillows to sleep.  She was explained that she has to lay flat for her angiogram procedure. She thinks she can lay flat with her head slightly raised.    Subjective:     Kulwant Amin is a 48 y.o. years old Faroese female with a significant PMH of hypertension, hyperlipidemia, GERD, prediabetes, insomnia, former smoker, asthma/COPD, and SVT, who is seen at [facility] for pre-coronary angiogram history and physical.  She was recently hospitalized in 1st week of January, 2018 with chest pain.  Her cardiac enzymes were negative but had an abnormal stress test and CT angiogram showed calcium score of 204, lesion in proximal LAD with severe luminal stenosis in the range of 70-99% noncalcified plaque detected indicating  high risk of cardiac events in the next 10 years. Based on the CTA coronary w calcium score result, her primary cardiology, Dr. Hirsch recommended angiogram with possible coronary intervention.  She also had a chest CT of abdomen showed minimal pulmonary nodules.  Her recent echo done in December 2070 showed an EF of 67% with no significant valvular heart disease.    Today, patient presented to the heart care clinic accompanied by her daughter and the Faroese . Kulwant  continues to have epigastric and precordial pain but has not worsened since discharged from the hospital.  She reports her heart palpitation has improved.  She reports fall few months ago due to lightheadedness and ankle pain but has not had further falls since then.  She denies any changes in her health condition since discharged from the recent hospitalization.  She was also  followed by her PCP.  She denies any major surgeries in the past or have any anaphylaxis reaction to medications or anesthesia.  She further denies having any stroke/TIA, bleeding or clotting disorders, malignant hyperthermia, hematuria, hemoptysis, hematochezia except minimal blood stain with constipation from straining.  She further denies any lightheaded, dyspnea on exertion, PND, palpitation, chest pain, abdominal fullness/bloating, recent cold, fever, chills, nausea, vomiting, diarrhea.  She uses 2-3 pillows to sleep for her breathing although she says she can lay flat with her head slightly raised for the angiogram.    Review of Systems:   General: Night Sweats  Eyes: WNL  Ears/Nose/Throat: WNL  Lungs: Cough, Shortness of Breath  Heart: Chest Pain, Arm Pain, Shortness of Breath with activity, Irregular Heartbeat  Stomach: Heartburn  Bladder: WNL  Muscle/Joints: Joint Pain  Skin: WNL  Nervous System: Falls, Loss of Balance, Dizziness  Mental Health: WNL     Blood: WNL     Patient Active Problem List   Diagnosis     Hyperglycemia     HTN (hypertension)     Insomnia     Pain     Muscle cramps     GERD (gastroesophageal reflux disease)     Prediabetes     SVT (supraventricular tachycardia)     Other chest pain     Leukocytosis     Chest pain     Asthma without status asthmaticus     Abnormal findings on diagnostic imaging of heart/coronary circulation     Past Medical History:   Diagnosis Date     Anxiety      Arthritis      Asthma      Back pain      COPD (chronic obstructive pulmonary disease)      Depression      Diabetes mellitus      Generalized headaches      HTN (hypertension)      Pneumonia      SVT (supraventricular tachycardia)      No past surgical history on file.    No family history on file.    Social History     Social History     Marital status:      Spouse name: N/A     Number of children: N/A     Years of education: N/A     Occupational History     Not on file.     Social History Main  Topics     Smoking status: Former Smoker     Quit date: 2003     Smokeless tobacco: Never Used     Alcohol use No     Drug use: No     Sexual activity: Not on file     Other Topics Concern     Not on file     Social History Narrative    12/22/2017 The patient lives with her daughter-in-law (who is present), , son, and 2 grandchildren (total of 6 people).        Current Outpatient Prescriptions   Medication Sig Dispense Refill     acetaminophen (TYLENOL) 500 MG tablet Take 500 mg by mouth every 6 (six) hours as needed for pain.       albuterol (PROAIR HFA;PROVENTIL HFA;VENTOLIN HFA) 90 mcg/actuation inhaler Inhale 2 puffs every 6 (six) hours as needed for wheezing. 1 Inhaler 2     albuterol (PROVENTIL) 2.5 mg /3 mL (0.083 %) nebulizer solution Take 3 mL (2.5 mg total) by nebulization every 6 (six) hours as needed for wheezing. 75 mL 12     amitriptyline (ELAVIL) 10 MG tablet Take 1 tablet (10 mg total) by mouth at bedtime. 30 tablet 5     blood glucose test Strp Use to test blood sugar up to 3 times daily.  Contour Next EZ. 50 strip 1     cimetidine (TAGAMET) 200 MG tablet Take 200 mg by mouth daily.  5     diphenhydrAMINE (BENADRYL) 25 mg tablet Take 1 tablet (25 mg total) by mouth bedtime as needed for sleep. 30 tablet 2     guaiFENesin (ROBITUSSIN) 100 mg/5 mL syrup Take 5 mL (100 mg total) by mouth 3 (three) times a day as needed for cough (DO NOT DRIVE OR OPERATE HEAVY MACHINERY WHILE ON ROBITUSSIN). 45 mL 0     hydroCHLOROthiazide (MICROZIDE) 12.5 mg capsule Take 1 capsule (12.5 mg total) by mouth daily. 90 capsule 1     lancets (MICROLET LANCET) Misc Use to test blood sugars up to 3 times daily. 100 each 0     metoprolol tartrate (LOPRESSOR) 25 MG tablet Take 1 tablet (25 mg total) by mouth 2 (two) times a day. 60 tablet 0     mometasone-formoterol (DULERA) 200-5 mcg/actuation HFAA inhaler Inhale 2 puffs 2 (two) times a day.       montelukast (SINGULAIR) 10 mg tablet Take 10 mg by mouth at bedtime.        omeprazole (PRILOSEC) 20 MG capsule Take 20 mg by mouth 2 (two) times a day before meals.        oxyCODONE-acetaminophen (PERCOCET) 7.5-325 mg per tablet Take 1 tablet by mouth every 4 (four) hours as needed for pain. 10 tablet 0     sucralfate (CARAFATE) 1 gram tablet Take 1 tablet (1 g total) by mouth 4 (four) times a day. 120 tablet 0     tiotropium bromide (SPIRIVA RESPIMAT) 2.5 mcg/actuation Mist Inhale 2 puffs daily. 4 g 4     No current facility-administered medications for this visit.        No Known Allergies    Objective:     Vitals:    01/23/18 0946   BP: 136/82   Pulse: 80   Resp: 16     Body mass index is 22.5 kg/(m^2).    General Appearance:   Alert, cooperative and in no acute distress.   HEENT:  No scleral icterus; the mucous membranes were pink and moist.  No JVD distention noted.   Chest: The spine was straight. The chest was symmetric.   Lungs:   Respirations unlabored; noted expiratory wheezing in all lobes with congested cough-history of COPD/asthma.   Cardiovascular:   Regular rhythm. S1 and S2 without murmur, clicks or rubs. Carotid, radial, and pedal pulses are intact and symmetrical-CMS intact.  No carotid bruits noted.   Abdomen:  Soft, nontender, nondistended, bowel sounds present   Extremities: No cyanosis, clubbing, or edema.   Skin: No xanthelasma.   Neurologic: Mood and affect are appropriate.           Lab Review   Lab Results   Component Value Date    CREATININE 0.73 01/06/2018    BUN 7 (L) 01/06/2018     01/06/2018    K 3.9 01/06/2018     01/06/2018    CO2 23 01/06/2018     Creatinine (mg/dL)   Date Value   01/06/2018 0.73   12/28/2017 0.63   12/27/2017 0.64   12/26/2017 0.71       Cardiographics  CTA coronary with calcium score  Interpretation Summary     The total Agatston calcium score is 204. A calcium score in this range places the individual in the >90th percentile when compared to an age and gender matched control group and implies a high risk of cardiac  events in the next ten years.    Prox LAD lesion. The lesion has severe luminal stenosis in the range of 70-99%. Non-calcified plaque has been detected.       NM pharmacologic stress test on 12/28/2017  Conclusion     The pharmacologic nuclear stress test is abnormal.    Stress perfusion images demonstrate no evidence of ischemia or infarction.    Increased stress to rest cavity ratio of 1.46 is a nonspecific finding but may be seen in some individuals with multivessel disease.    The left ventricular ejection fraction is 75% without wall motion abnormality.    There is no prior study available.    The patient is at an intermediate risk of future cardiac ischemic events.    The findings of this examination were communicated to the nursing staff at [facility].         Echocardiogram: 12/27/2017  Summary     Left ventricle ejection fraction is normal. The calculated left ventricular ejection fraction is 67%.No significant valvular heart disease    No previous study for comparison       30 minutes were spent with the patient with greater than 50% spent on education and counseling.      Deandra Emmanuel, [facility]

## 2021-06-15 NOTE — PROGRESS NOTES
"Cardiac Rehab  Phase II Assessment    Assessment Date: 2-8-18    Diagnosis: CAD    Procedure: PCI with VINICIO x 2 to LAD  Date of Onset: 1-25-18  ICD/Pacemaker: No   Post-procedure Complications: Retroperitoneal Hematoma  ECG History: NSR EF%:60%  Past Medical History:   Patient Active Problem List   Diagnosis     Hyperglycemia     HTN (hypertension)     COPD (chronic obstructive pulmonary disease)     Insomnia     Pain     Muscle cramps     GERD (gastroesophageal reflux disease)     Prediabetes     SVT (supraventricular tachycardia)     Other chest pain     Leukocytosis, unspecified type     Chest pain     Asthma without status asthmaticus     Abnormal findings on diagnostic imaging of heart/coronary circulation     CAD (coronary artery disease)     Right lower quadrant pain     Retroperitoneal bleed     Other specified hypotension     Acute blood loss anemia     Retroperitoneal hematoma     Abdominal pain     Acute midline low back pain without sciatica     DM type 2 (diabetes mellitus, type 2)     S/P coronary artery stent placement     Past Medical History:   Diagnosis Date     Anxiety      Arthritis      Asthma      Back pain      COPD (chronic obstructive pulmonary disease)      Depression      Diabetes mellitus      Generalized headaches      HTN (hypertension)      Pneumonia      SVT (supraventricular tachycardia)      Past Surgical History:   Procedure Laterality Date     CV CORONARY ANGIOGRAM N/A 1/25/2018    Procedure: Coronary Angiogram;  Surgeon: Angelo Serrano MD;  Location: [facility];  Service:      Physical Assessment  Precautions/ Physical Limitations: Standard/ Falls  Oxygen: No  O2 Sats: % Lung Sounds: Clear Edema: none  Incisions: na  Sleeping Pattern: fair   Appetite: fair   Nutrition Risk Screen: no risk at this time    Pain  Location: Right shoulder  Characteristics:\"Achy\"  Intensity: (0-10 scale) 2  Current Pain Management: Lipitor on hold for 2 weeks       Psychosocial/ " "Emotional Health  1. In the past 12 months, have you been in a relationship where you have been abused physically, emotionally, sexually or financially? No  notified: NA  2. Who do you turn to for emotional support?: \"My kids\"  3. Do you have cultural or spiritual needs? No  4. Have there been any major life changes in the past 12 months? Yes- \"I am sick\"    Referral Information  Primary Physician: Adrien Cardoza MD  Cardiologist: Dr Hirsch      Home exercise/Equipment: none    Patient's long-term goal(s): Improve strength     1. Living Accommodations: Home Steps: No      Support people at home: Lives with Son and Daughter-in-law   2. Marital Status:   3. Family is able to assist with cares      Jewish/Community involvement: Yes  4. Recreation/Hobbies: \"I can't do anything any more\"        See Doc Flowsheet  "

## 2021-06-15 NOTE — PROGRESS NOTES
ASSESMENT AND PLAN:  Diagnoses and all orders for this visit:    Hospital discharge follow-up  Reviewed all her medications.  Plavix, aspirin and metoprolol refilled.  -     traMADol (ULTRAM) 50 mg tablet; Take 1 tablet (50 mg total) by mouth every 8 (eight) hours as needed for pain.  Dispense: 30 tablet; Refill: 0  She has 9 oxycodone pills left.  Discussed potential side effects including constipation.    Retroperitoneal hematoma  -     HM2 (CBC W/O DIFF)  Hemoglobin was 11.0 two days ago. Recheck today.    S/P coronary artery stent placement  Hospital course complicated with retroperitoneal hematoma, pain improved now.    Coronary artery disease involving native coronary artery of native heart with angina pectoris  -     metoprolol tartrate (LOPRESSOR) 25 MG tablet; Take 1 tablet (25 mg total) by mouth 2 (two) times a day.  Dispense: 180 tablet; Refill: 1  -     aspirin 81 MG EC tablet; Take 1 tablet (81 mg total) by mouth daily.  Dispense: 90 tablet; Refill: 1  -     clopidogrel (PLAVIX) 75 mg tablet; Take 1 tablet (75 mg total) by mouth daily.  Dispense: 90 tablet; Refill: 1  Follow-up with cardiology and cardiac rehab on 2/8/2018.      Prediabetes   Hemoglobin A1c 6.2 this month.  Recheck at next visit.    GERD   -     omeprazole (PRILOSEC) 20 MG capsule; Take 1 capsule (20 mg total) by mouth daily.  Dispense: 90 capsule; Refill: 1  Discontinue cimetidine, failed to improve.    COPD  Continue current regimen.  No acute exacerbation.    SUBJECTIVE: Kulwant Amin is a 48-year-old female with multiple ago problems here for hospital follow-up.  She was recently admitted to the hospital from 1/25/18- 1/27/18 and again from 1/29/18- 1/31/18.    Patient underwent coronary angiogram on 1/25/2018 at [facility] with stent placement.  Hospital course was complicated with right retroperitoneal hematoma and bleeding and was admitted from 1/25/18-1/27/18.  She was discharged with aspirin and Plavix on 1/27/2017 .  (no discharge summary available to review from that admission).  She presented to ER again 2 days later on 1/29/2018 with severe right sided abdominal pain, repeat CT showed stable/decreasing right retroperitoneal hematoma.  She was admitted again overnight for observation.  She was discharged on 1/31/2018 in a stable condition.  She was sent home with oxycodone,she is still taking once or twice a day.  States the pain has significantly improved but still having mild pain.  No fever since the hospital discharge.  No chest pain or palpitations since the hospital discharge.  No breathing problems since hospital discharge.    No new complaints or concerns since hospital discharge.    She has appointment scheduled with cardiology on 2/8/2018.      Past Medical History:   Diagnosis Date     Anxiety      Arthritis      Asthma      Back pain      COPD (chronic obstructive pulmonary disease)      Depression      Diabetes mellitus      Generalized headaches      HTN (hypertension)      Pneumonia      SVT (supraventricular tachycardia)      Patient Active Problem List   Diagnosis     Hyperglycemia     HTN (hypertension)     COPD (chronic obstructive pulmonary disease)     Insomnia     Pain     Muscle cramps     GERD (gastroesophageal reflux disease)     Prediabetes     SVT (supraventricular tachycardia)     Other chest pain     Leukocytosis, unspecified type     Chest pain     Asthma without status asthmaticus     Abnormal findings on diagnostic imaging of heart/coronary circulation     CAD (coronary artery disease)     Right lower quadrant pain     Retroperitoneal bleed     Other specified hypotension     Acute blood loss anemia     Retroperitoneal hematoma     Abdominal pain     Acute midline low back pain without sciatica     DM type 2 (diabetes mellitus, type 2)     S/P coronary artery stent placement       Allergies:  No Known Allergies    History   Smoking Status     Former Smoker     Quit date: 2003   Smokeless Tobacco  "    Never Used       Review of systems otherwise negative except as listed in HPI.   History   Smoking Status     Former Smoker     Quit date: 2003   Smokeless Tobacco     Never Used       OBJECTICE: /76 (Patient Site: Left Arm, Patient Position: Sitting, Cuff Size: Adult Regular)  Pulse 92  Temp 97.8  F (36.6  C) (Oral)   Resp 20  Ht 5' 4\" (1.626 m)  Wt 129 lb 2 oz (58.6 kg)  SpO2 100%  BMI 22.16 kg/m2          GEN-alert,  in no apparent distress.  Sitting comfortably in the chair.  Able to transfer to the exam table without assistant.  HEENT-mucous membranes are moist, neck is supple.  CV-regular rate and rhythm with no murmur.   RESP-lungs clear to auscultation .  ABDOMEN- Soft , mild tenderness on right lower quadrant and right inguinal area.  No rebound or guarding.  SKIN-normal        [facility]   2/2/2018   This transcription uses voice recognition software, which may contain typographical errors.      "

## 2021-06-15 NOTE — PROGRESS NOTES
"TCM DISCHARGE FOLLOW UP CALL      \"Hi, my name is  Britney, and I am calling from [facility].  I am calling to follow up and see how things are going for you after your recent hospitalization.      Tell me how you are doing now that you are home?\" Caregiver, Billy, Consent to Communicate on file reports that Kulwant is feeling better but they plan to see the doctor today for a follow up. No immediate concerns.      Discharge Instructions     Do you understand your discharge instructions?  Pt. Response:  Yes      \"Has an appointment with your primary care provider been scheduled?\"  Yes    \"If yes, when is that appointment?    Today at 2pm    If no, date of appointment scheduled: N/A       Medications    \"Did you have any medication changes?   No     Do you have any concerns with obtaining the medications or questions about your medications that you would like a RN to review with you?  No      \"When you see the provider, I would recommend that you bring your medications with you.\"      Call Summary    \"What questions or concerns do you have about your recent visit and your follow-up care?\"  None, we will talk to the doctor              - RN Triage Number is [phone] if patient needs to be immediatly transferred -         \"If you have questions or things don't continue to improve, we encourage you contact us through the main clinic number at [phone].  Even if the clinic is not open, triage nurses are available 24/7 to help you.          The Clinic Community Health Worker spoke with the patient today due to ED/Hospital Discharge to discuss possible Clinic Care Coordination enrollment.  The service was described to the patient and immediate needs were discussed.  The patient declined enrollment at this time.  The PCP is encouraged to refer in the future if the patient's needs change.  "

## 2021-06-15 NOTE — PATIENT INSTRUCTIONS - HE
1. I have given you a prescription for Doxycycline and Prednisone to take for 5 days. If at the end of this, you are still feeling shortness of breath and wheezing, please call the clinic and we will extend the prednisone course.

## 2021-06-15 NOTE — TELEPHONE ENCOUNTER
----- Message from Shruthi Beckford V sent at 3/5/2021 11:05 AM CST -----  Patient has already contacted their pharmacy. The medication or refill issue is below: pt needs refill not due to see joycelyn until next year    Primary Cardiologist: Joycelyn  Medication: [place] 6 mg, indometcacine 25 mg  Issue / Concern: See above  Preferred Pharmacy/City: [facility] - [place], [place] - [address]  [facility] Phone Number for Patient: [phone]    Additional Info:

## 2021-06-15 NOTE — TELEPHONE ENCOUNTER
RN cannot approve Refill Request    RN can NOT refill this medication Protocol failed and NO refill given. Last office visit: 3/5/2021 Adrien Cardoza MD Last Physical: 1/5/2021 Last MTM visit: 5/21/2019 Malu Muñoz, PharmD Last visit same specialty: 3/5/2021 Adrien Cardoza MD.  Next visit within 3 mo: Visit date not found  Next physical within 3 mo: Visit date not found      Kailee Patton, Care Connection Triage/Med Refill 3/14/2021    Requested Prescriptions   Pending Prescriptions Disp Refills     acetaminophen (TYLENOL) 500 MG tablet [Pharmacy Med Name: ACETAMINOPHEN 500 MG TABS 500 Tablet] 90 tablet 10     Sig: TAKE 1 PILL BY MOUTH EVERY 6 HOURS AS NEEDED FOR PAIN       There is no refill protocol information for this order        polyethylene glycol (GLYCOLAX) 17 gram/dose powder [Pharmacy Med Name: POLYETHYLENE GLYCOL 3350 PO 17 Powder] 510 g 12     Sig: MIX 17 GRAMS WITH LIQUID AND DRINK ONCE DAILY FOR CONSTIPATION       GI Medications Refill Protocol Passed - 3/13/2021  1:21 PM        Passed - PCP or prescribing provider visit in last 12 or next 3 months.     Last office visit with prescriber/PCP: 3/5/2021 Adrien Cardoza MD OR same dept: 3/5/2021 Adrien Cardoza MD OR same specialty: 3/5/2021 Adrien Cardoza MD  Last physical: 1/5/2021 Last MTM visit: 5/21/2019 Malu Muñoz, PharmD   Next visit within 3 mo: Visit date not found  Next physical within 3 mo: Visit date not found  Prescriber OR PCP: Adrien Cardoza MD  Last diagnosis associated with med order: 1. Type 2 diabetes mellitus treated without insulin (H)  - acetaminophen (TYLENOL) 500 MG tablet [Pharmacy Med Name: ACETAMINOPHEN 500 MG TABS 500 Tablet]; TAKE 1 PILL BY MOUTH EVERY 6 HOURS AS NEEDED FOR PAIN  Dispense: 90 tablet; Refill: 10    2. Hemorrhoids, unspecified hemorrhoid type  - polyethylene glycol (GLYCOLAX) 17 gram/dose powder [Pharmacy Med Name: POLYETHYLENE GLYCOL 3350 PO 17 Powder]; MIX 17 GRAMS WITH LIQUID AND DRINK ONCE DAILY FOR CONSTIPATION   Dispense: 510 g; Refill: 12    If protocol passes may refill for 12 months if within 3 months of last provider visit (or a total of 15 months).

## 2021-06-15 NOTE — PROGRESS NOTES
Assessment/Plan:    50 y.o.-year-old woman with history of organic fuel exposure in the home; visit was conducted through a [place] .  Her PFTs have been previously noted not to be diagnostic due to poor effort, additionally, she has iron deficiency with significant anemia and is not entirely clear how significantly her anemia is contributing to her shortness of breath.  Is experiencing yet another flare and on review of her last CT scan it is apparent that the debris noted in her airways has now migrated to different location and is likely secondary to some aspiration.  (Patient has previously had normal swallow evaluation).  Notably, her transthoracic echocardiogram and BNP as recently as December 2019 were noted to be unremarkable.  For the present we would recommend;    Continue Breo Ellipta 1 puff daily.  She knows to gargle after using this.    Continue Spiriva.    Continue to use 3 times daily albuterol nebs.    Continue 2.5 mg of prednisone daily once her steroid burst is complete.    Continue Protonix 40 mg daily.    She is up-to-date with her influenza vaccination.    Follow-up in 4 weeks.  I reminded her daughter-in-law again to call me if she is not feeling well.    She has some minimal bibasilar bronchiectasis and we may have to consider initiating her on airway clearance therapy (given her worsening wheezing I hesitate to initiate her Mucomyst presently).    I have ordered a total IgE and [facility] allergen panel on her.    I placed a referral to Dr. Elizabeth Marie from allergy given that she had mild eosinophilia at her last clinic visit.    Tamra Duong  Pulmonary and Critical Care  [id number]      Subjective:    Patient ID: Ms. Amin is a 49 y.o.female with a past medical history significant for anxiety, arthritis, questionable asthma versus COPD, diabetes, hypertension and a prior history of SVT presents with a follow-up visit for her pulmonary complaints.  She follows with me fairly closely  for her moderate-persistent, difficult to control asthma symptoms.  She was last seen by me in clinic several weeks ago at which time she was experiencing get another asthma exacerbation and had been initiated on a slower prednisone taper.  She tells me today that overall her wheezing has resolved since the last visit she continues to be fairly short of breath and is compliant with her inhaled bronchodilator therapy.  She complains of fatigue but denies fevers, chills, night sweats, change in her weight chest pain or chest tightness.    Pertinent past medical history:  48-year-old female here for follow-up.  Her breathing is a bit worse than 6 months ago.  She had multiple ER visits and evaluations.  This includes negative PE study.  She had a cath with a 75% LAD lesion which was intervened upon.  Since her catheterization she feels she has more heartburn.  Her breathing is worse with exertion.  Improves with rest.  It is also worse with cold weather.  Warm weather and humid air does not bother.  Symptoms localized to the chest.  Pertinent positives include burning sensation in the chest.  Pertinent negatives include no fever or hemoptysis.    Current Outpatient Medications   Medication Sig Dispense Refill     acetaminophen (TYLENOL) 500 MG tablet Take 2 tablets (1,000 mg total) by mouth every 8 (eight) hours as needed for pain. 90 tablet 10     albuterol (PROVENTIL) 2.5 mg /3 mL (0.083 %) nebulizer solution Take 3 mL (2.5 mg total) by nebulization every 6 (six) hours as needed for wheezing. 150 mL 12     albuterol (VENTOLIN HFA) 90 mcg/actuation inhaler Inhale 2 puffs every 6 (six) hours as needed for wheezing. 1 Inhaler 12     amitriptyline (ELAVIL) 10 MG tablet TAKE 2 TABLETS (20 MG TOTAL) BY MOUTH AT BEDTIME. 180 tablet 3     aspirin 325 MG tablet Take 1 tablet (325 mg total) by mouth daily with breakfast. 30 tablet 11     aspirin 81 MG EC tablet 1 tablet daily.       atorvastatin (LIPITOR) 80 MG tablet TAKE 1  "TABLET (80 MG TOTAL) BY MOUTH AT BEDTIME FOR CHOLESTEROL 30 tablet 3     BD ULTRA-FINE SHORT PEN NEEDLE 31 gauge x 5/16\" Ndle see administration instructions.       blood glucose meter (GLUCOMETER) Use 1 each As Directed 3 (three) times a day. Dispense glucometer brand per patient's insurance at pharmacy discretion.(E11.9) 1 each 0     blood glucose test strips Use 1 each As Directed 3 (three) times a day. Dispense brand per patient's insurance at pharmacy discretion.(E11.9) 300 strip 3     clopidogreL (PLAVIX) 75 mg tablet TAKE 1 TABLET (75 MG TOTAL) BY MOUTH DAILY. 90 tablet 1     colchicine 0.6 mg tablet TAKE 1 TABLET (0.6 MG TOTAL) BY MOUTH DAILY 90 tablet 0     ferrous sulfate 325 (65 FE) MG tablet Take 1 tablet (325 mg total) by mouth daily with breakfast. 90 tablet 3     flash glucose scanning reader (FREESTYLE MONICA 14 DAY READER) Misc Use 1 Units As Directed every 8 (eight) hours. 1 each 0     flash glucose sensor (FREESTYLE MONICA 14 DAY SENSOR) Kit Use 1 Units As Directed every 14 (fourteen) days. 6 kit 3     fluticasone furoate-vilanteroL (BREO ELLIPTA) 200-25 mcg/dose DsDv inhaler Inhale 1 puff daily. 1 each 12     gabapentin (NEURONTIN) 300 MG capsule TAKE 1 CAPSULE (300 MG TOTAL) BY MOUTH 2 (TWO) TIMES A DAY FOR NERVE PAIN 180 capsule 3     generic lancets Use 1 each As Directed 3 (three) times a day. Dispense brand per patient's insurance at pharmacy discretion.(E11.9) 300 each 3     hydroCHLOROthiazide (MICROZIDE) 12.5 mg capsule TAKE 1 CAPSULE (12.5 MG TOTAL) BY MOUTH DAILY FOR BLOOD PRESSURE 90 capsule 2     INJECT EASE LANCETS 30 gauge Misc USE 1 EACH AS DIRECTED THREE TIMES A DAY *34 DAYS* 100 each 11     insulin aspart U-100 (NOVOLOG) 100 unit/mL injection Inject under the skin 3 (three) times a day with meals. Use per sliding scale < 150 - No insulin, 151-200 use 3 U, 201- 250 use 5 U,   251- 300 use 8 U        insulin syringe-needle U-100 0.3 mL 30 x 3/8\" Syrg Use as needed sliding scale " "insulin as directed. 100 Syringe 0     meclizine (ANTIVERT) 25 mg tablet Take 1 tablet (25 mg total) by mouth 4 (four) times a day. 28 tablet 0     meclizine (ANTIVERT) 25 mg tablet Take 1 tablet (25 mg total) by mouth 3 (three) times a day as needed for dizziness. 28 tablet 0     metFORMIN (GLUCOPHAGE) 850 MG tablet Take 1 tablet (850 mg total) by mouth 2 (two) times a day with meals. 60 tablet 11     metoprolol tartrate (LOPRESSOR) 25 MG tablet TAKE 1 TABLET (25 MG TOTAL) BY MOUTH 2 (TWO) TIMES A DAY FOR BLOOD PRESSURE 60 tablet 3     montelukast (SINGULAIR) 10 mg tablet TAKE 1 PILL (10 MG TOTAL) BY MOUTH AT BEDTIME FOR ASTHMA 30 tablet 10     nystatin (MYCOSTATIN) 100,000 unit/mL suspension Take 5 mL (500,000 Units total) by mouth 4 (four) times a day for 10 days. 200 mL 0     omeprazole (PRILOSEC) 40 MG capsule Take 40 mg by mouth daily before breakfast.       polyethylene glycol (GLYCOLAX) 17 gram/dose powder MIX 17 GRAMS WITH LIQUID AND DRINK ONCE DAILY. Hold for loose stools. 510 g 12     predniSONE (DELTASONE) 2.5 MG tablet 1 tablet daily.       sucralfate (CARAFATE) 1 gram tablet Take 1 tablet (1 g total) by mouth 4 (four) times a day before meals and at bedtime. Take 1 hour prior to meals 120 tablet 11     tiotropium (SPIRIVA) 18 mcg inhalation capsule Place 1 capsule (2 puffs total) into inhaler and inhale daily. Place 1 capsule into the inhaler device. Close and press button to crush the capsule. Inhale the contents of the crushed capsule in 2 breaths. 30 capsule 11     No current facility-administered medications for this visit.        Physical Exam   /60   Pulse 75   Ht 5' 3\" (1.6 m)   Wt 121 lb (54.9 kg)   SpO2 98%   Breastfeeding No   BMI 21.43 kg/m    Physical Exam   Constitutional: She is oriented to person, place, and time. She appears well-developed and well-nourished.   Tired appearing.   HENT:   Head: Normocephalic.   Eyes: Pupils are equal, round, and reactive to light.   Neck: " Normal range of motion.   Cardiovascular: Normal rate and regular rhythm.   Pulmonary/Chest: Effort normal and breath sounds normal.   End expiratory wheezing appreciated.   Abdominal: Soft.   Musculoskeletal: Normal range of motion.         General: No edema.   Neurological: She is alert and oriented to person, place, and time.   Skin: Skin is warm.   Psychiatric: She has a normal mood and affect.           Tamra Duong MD  Pulmonary and Critical Care  (p) [phone]

## 2021-06-15 NOTE — PROGRESS NOTES
ASSESMENT AND PLAN:  Diagnoses and all orders for this visit:    Hospital discharge follow-up  Stable after hospital discharge.  All medications reviewed with the patient and daughter-in-law .    SVT (supraventricular tachycardia)  Advised to keep future appointments.    HTN (hypertension)  Discontinued hydrochlorothiazide and started on Lopressor during recent hospital stay.  No changes made today.    Asthma without status asthmaticus   No acute wheezing or shortness of breath.  Continue current regimen.  No refill needed today.    GERD  Back on omeprazole.  Will consider upper GI after she completed cardiac workups.  Follow-up in 4 weeks.    SUBJECTIVE: Kulwant Amin is a 48-year-old female here for hospital follow-up.  He was admitted from 12/26/2017 to 12/29/2017 with SVT with heart rate up to 200-2 20/min.  EKG was not suggestive of acute infarct and troponin was not elevated.  Pulmonary embolus was ruled out.  Echo showed normal LVEF with normal wall motion changes.  Stress test showed no evidence of ischemia.  Cardiology was consulted during hospital stay and was recommended to have beta-blocker.CTA coronary with calcium score scheduled on 1/11/2018.  She was also referred to electrophysiology , appointment scheduled on 1/28/2018.    She states she had similar problems a few years ago.  She was admitted as inpatient for 5 days per daughter-in-law.  It is is unclear if she was recommended to follow-up with cardiology at that time.    States she is doing relatively well after hospital discharge.  He denies shortness of breath or palpitations.    She continued to have heartburn symptoms.  Cimetidine is not helping and she went back to Prilosec, states Prilosec is working better.  She is taking 20 mg once or twice a day depending on her symptoms.  No vomiting.  No diarrhea or blood in the stool.        Past Medical History:   Diagnosis Date     Anxiety      Arthritis      Asthma      Back pain      COPD (chronic  "obstructive pulmonary disease)      Depression      Diabetes mellitus      Generalized headaches      HTN (hypertension)      Pneumonia      Patient Active Problem List   Diagnosis     Hyperglycemia     HTN (hypertension)     Insomnia     Pain     Muscle cramps     GERD (gastroesophageal reflux disease)     Prediabetes     SVT (supraventricular tachycardia)     Other chest pain     Hypokalemia     Leukocytosis     Chest pain       Allergies:  No Known Allergies    History   Smoking Status     Former Smoker   Smokeless Tobacco     Never Used       Review of systems otherwise negative except as listed in HPI.   History   Smoking Status     Former Smoker   Smokeless Tobacco     Never Used       OBJECTICE: /78 (Patient Site: Right Arm, Patient Position: Sitting, Cuff Size: Adult Regular)  Pulse 68  Temp 98.1  F (36.7  C) (Oral)   Resp 20  Ht 5' 3\" (1.6 m)  Wt 132 lb 5 oz (60 kg)  BMI 23.44 kg/m2    DATA REVIEWED:  Additional History from Old Records Summarized (2):   Radiology Tests Summarized or Ordered (1):   Labs Reviewed or Ordered (1):       GEN-alert,  in no apparent distress.Mood is normal.  HEENT-mucous membranes are moist, neck is supple.  CV-regular rate and rhythm with no murmur.   RESP-wheezing on exam today.  ABDOMEN- Soft , not tender.  SKIN-normal        [facility]   1/3/2018   This transcription uses voice recognition software, which may contain typographical errors.      "

## 2021-06-16 NOTE — PROGRESS NOTES
"ITP ASSESSMENT   Assessment Day: 30 Day  Session Number: 6  Precautions: Standard/ Falls  Diagnosis: Stent  Risk Stratification: Medium  Referring Provider: Dr Serrano  EXERCISE  Exercise Assessment: Reassessment   Pt tolerated 30 min of low level exercise without cardiac sx's. Met levels are 1.4-2.2                       Exercise Plan  Goals Next 30 days  ADL'S: Resume light housework and cooking  Leisure: Increase home walking time    Education Goals: All goals in this section met  Education Goals Met: Patient can state cardiac s/s and appropriate emergency response.;Has system for taking medication.;Medication review.                        Goals Met  Initial ADL's goals met: Pt has not resumed light housework or cooking-goal not met  Initial Leisure goals met: Pt has started \"some\" home walking  Initial Progression: Pt states she is limited due to her \"asthma\" and \"leg and arm discomfort\"    Exercise Prescription  Exercise Mode: Treadmill;Bike;Nustep;Arm Erg.;Hallway Walking  Frequency: 2 x a week  Duration: 20-40 min  Intensity / THR: 20-30 beats above resting heart rate  RPE 11-14  Progression / Met level: 2-2.8  Resistive Training?: Yes    Current Exercise (mins/week): 25    Interventions  Home Exercise:  Mode: Walking program  Frequency: 5-6 days per week  Duration: 20-25 min    Education Material : Educational videos;Provide written material;Individual education and counseling;Offer educational classes    Education Completed  Exercise Education Completed: Cardiac Anatomy;Signs and Symptoms;Medication review;RPE;Emergency Plan;Home Exercise;Warm up/cool down;FITT Principles;BP/HR Reponse to exercise;Benefits of Exercise;End point of exercise            Exercise Follow-up/Discharge  Follow up/Discharge: Reviewed home walking program         NUTRITION  Nutrition Assessment: Reassessment    Nutrition Risk Factors:  Nutrition Risk Factors: Diabetes;Dyslipidemia  HbA1c: 6.2  Monitors blood sugar at home: " "No  Cholesterol: 197  LDL: 121  HDL: 48  Triglycerides: 139    Nutrition Plan  Interventions  No Data Recorded  Other Nutrition Intervention: Therapist/Pt Discussion;Provide with Written Material;Diet Class;Educational Videos  Initial Rate Your Plate Score: 59    Education Completed  Nutrition Education Completed: Low Saturated fat diet;Low sodium diet    Goals  Nutrition Goals (Next 30 days): Patient will follow a low sodium diet;Patient will follow a low saturated fat diet    Goals Met  Nutrition Goals Met: Patient follows a low sodium diet;Patient states following a low saturated fat diet    Height, Weight, and  BMI  Weight: 130 lb (59 kg)  Height: 5' 3\" (1.6 m)  BMI: 23.03    Nutrition Follow-up  Follow-up/Discharge: Patient follows a heart healthy diet majority of the time.  She does not cook; a family member cooks.       Other Risk Factors  Other Risk Factor Assessment: Reassessment    HTN Risk Factor: Hypertension    Pre Exercise BP: 100/62  Post Exercise BP: 90/54    Hypertension Plan  Goals  HTN Goals: Follow low sodium diet;Take medication as prescribed;Exercises regularly    Goals Met  HTN Goals Met: Take medication as prescribed    HTN Interventions  HTN Interventions: Diet consult;Therapist/patient discussion;Provide written material;Offer educational videos;Offer educational classes    HTN Education Completed  HTN Education Completed: Risk factor overview;Medication review    Tobacco Risk Factor: NA    Risk Factor Follow-up   Follow-up/Discharge: Continue to offer support and education for risk factor management PSYCHOSOCIAL  Psychosocial Assessment: Reassessment    Psychosocial Risk Factor: Stress    Psychosocial Plan  Interventions  Interventions: Offer educational videos and classes;Provide written material;Individual education and counseling    Education Completed  Education Completed: Effects of stress on body;Relaxation/Coping Techniques;S/S of depression    Goals  Goals (Next 30 days): Identify " stressors;Improvement in [facility] score;Practicing stress management skills    Goals Met  Goals Met: Identified Support system;Oriented to stress management classes    Psychosocial Follow-up  Follow-up/Discharge: Pt reports she is managing her stress without difficulty         Patient involved in Goal setting?: Yes    Signature: _____________________________________________________________    Date: __________________    Time: __________________See Doc Flowsheet

## 2021-06-16 NOTE — TELEPHONE ENCOUNTER
Dr. Marie    This person called and is requesting a call back:    Name Of Person Who Called: [facility]    Why Did The Person Call: Needs clarification on the [employer] Overnight RX.    Ref # [id number]    Best Phone Number To Call Back: [phone]    Okay To Leave A Detailed Voicemail? N/A    Thank you.

## 2021-06-16 NOTE — TELEPHONE ENCOUNTER
CALLED AND DISCUSSED with [facility], they now have the auto injector pen 1 monthly times 3. Then inject 1 under the skin every 8 weeks. OK'd 6 refills to the 1 pen.

## 2021-06-16 NOTE — TELEPHONE ENCOUNTER
Telephone Encounter by Martine Bowman RN at 7/12/2019  1:27 PM     Author: Martine Bowman RN Service: -- Author Type: Registered Nurse    Filed: 7/16/2019  2:49 PM Encounter Date: 7/11/2019 Status: Addendum    : Martine Bowman RN (Registered Nurse)    Related Notes: Original Note by Martine Bowman RN (Registered Nurse) filed at 7/12/2019  2:46 PM       Attempted to call patient via [email or web address] . No answer- will attempt at a later time.  ============  Dle Hirsch MD  You 3 hours ago (9:36 AM)      Yes we can provide her Valium 5 mg x 1.  Thanks.

## 2021-06-16 NOTE — PROGRESS NOTES
Patient and daughter Billy in for Fasenra training. Daughter watched me do demonstration with training device and then did training device herself. She then did her mother Kulwant's actually Fasenra injection in her left thigh. Did perfectly, listened for the second click and pulled out and patient did fine with injection. Said it was a little painful but she was fine.  Discharged and [facility] to make sure she is scheduled for her delivery in one month (X2) and then go to the every 8 week schedule. Understands she can do the next ones in patient home.

## 2021-06-16 NOTE — TELEPHONE ENCOUNTER
Refill Approved    Rx renewed per Medication Renewal Policy. Medication was last renewed on 12/29/20.    Eddie Warner, [facility] Triage/Med Refill 4/16/2021     Requested Prescriptions   Pending Prescriptions Disp Refills     metoprolol tartrate (LOPRESSOR) 25 MG tablet [Pharmacy Med Name: METOPROLOL TARTRATE 25 MG T 25 Tablet] 60 tablet 3     Sig: TAKE 1 TABLET (25 MG TOTAL) BY MOUTH 2 (TWO) TIMES A DAY FOR BLOOD PRESSURE       Beta-Blockers Refill Protocol Passed - 4/15/2021  8:19 AM        Passed - PCP or prescribing provider visit in past 12 months or next 3 months     Last office visit with prescriber/PCP: 2/12/2021 Avi Hankins MD OR same dept: 3/22/2021 Jose Rafael Meadows MD OR same specialty: 3/22/2021 Jose Rafael Meadows MD  Last physical: Visit date not found Last MTM visit: 5/21/2019 Malu Muñoz, PharmD   Next visit within 3 mo: Visit date not found  Next physical within 3 mo: Visit date not found  Prescriber OR PCP: Avi Hankins MD  Last diagnosis associated with med order: 1. S/P coronary artery stent placement  - metoprolol tartrate (LOPRESSOR) 25 MG tablet [Pharmacy Med Name: METOPROLOL TARTRATE 25 MG T 25 Tablet]; TAKE 1 TABLET (25 MG TOTAL) BY MOUTH 2 (TWO) TIMES A DAY FOR BLOOD PRESSURE  Dispense: 60 tablet; Refill: 3    If protocol passes may refill for 12 months if within 3 months of last provider visit (or a total of 15 months).             Passed - Blood pressure filed in past 12 months     BP Readings from Last 1 Encounters:   03/22/21 104/62

## 2021-06-16 NOTE — TELEPHONE ENCOUNTER
Dr. Marie  This person called and is requesting a call back:    Name Of Person Who Called: [facility]   Why Did The Person Call: Fasenra order to be delivered to patient on 4/13 but there is a note to send to clinic - please clarify where to be shipped     Best Phone Number To Call Back: [phone]    Okay To Leave A Detailed Voicemail? yes    Thank you.

## 2021-06-16 NOTE — PROGRESS NOTES
Subjective:   Kulwant presents today with a one-week history of cough.  Cough is minimally productive.  It is not associated with fevers.  She denies significant nasal congestion or sore throat.  She has had a little more shortness of breath.  She hears herself wheezing more so than normal.  She does have a history of COPD, diabetes, hypertension, coronary artery disease and gastroesophageal reflux disease.  She has inhalers which she is using.  She also uses her nebulizer.  Despite these she still has significant cough especially at nighttime.      Objective:  HEENT: Both TMs are gray.  Conjunctivae clear.  Nasal mucosa minimally inflamed.  Sinuses nontender.  Pharynx today reveals no erythema at all.  No exudate seen.  Neck: No lymphadenopathy present.  No thyromegaly present.  No increased JVD noted.  Lungs: There are bilateral expiratory and inspiratory wheezes.  Patient is in no obvious respiratory distress today.  No rales are present.  Cardiac: There is a regular rhythm present.  No murmur heard.  Abdomen: No pulsatile masses present.  No tenderness noted.  Musculoskeletal: No edema noted in the feet today.  Skin is warm and dry.      Assessment:  1.  Cough secondary to bronchospasm  2.  Bronchospasm secondary to COPD exacerbation.  No evidence for bronchitis      Plan:  The patient will try to increase her liquid intake.  She will continue her nebulizers and inhalers.  We will add prednisone 20 mg daily for 5 days in hopes that this helps her bronchospasm.  I informed her of the possibility of elevated blood sugars on this.  She will follow-up here if her symptoms would persist.  She will continue to monitor for fevers.  If she develops any productive mucopurulent material she will come in for recheck and chest x-ray.

## 2021-06-16 NOTE — TELEPHONE ENCOUNTER
Telephone Encounter by Ashleigh Bourgeois at 7/11/2019  2:21 PM     Author: Ashleigh Bourgeois Service: -- Author Type: --    Filed: 7/11/2019  2:21 PM Encounter Date: 7/8/2019 Status: Signed    : Ashleigh Bourgeois APPROVED:    Approval start date:07/11/2019  Approval end date:07/11/2020    Pharmacy has been notified of approval and will contact patient when medication is ready for pickup.

## 2021-06-16 NOTE — TELEPHONE ENCOUNTER
Telephone Encounter by Addis Camarena at 2/15/2019  9:53 AM     Author: Addis Camarena Service: -- Author Type: --    Filed: 2/15/2019  9:55 AM Encounter Date: 2/11/2019 Status: Signed    : Addis Camarena       PA INITIATION

## 2021-06-16 NOTE — TELEPHONE ENCOUNTER
Telephone Encounter by Kimberlyn Castle at 10/3/2019  4:51 PM     Author: Kimberlyn Castle Service: -- Author Type: --    Filed: 10/3/2019  4:51 PM Encounter Date: 10/2/2019 Status: Signed    : Kimberlyn Castle APPROVED:    Approval start date:10/3/19  Approval end date:10/3/2020    Pharmacy has been notified of approval and will contact patient when medication is ready for pickup.

## 2021-06-16 NOTE — TELEPHONE ENCOUNTER
Fax received from pharmacy. States that Fasena Pen 30mg/mL SOAJ is not available to order. Please change to alternative or advise.

## 2021-06-16 NOTE — TELEPHONE ENCOUNTER
LM regarding below.    ----- Message from Elizabeth Marie MD sent at 3/29/2021  7:03 AM CDT -----  I submitted for Fasenra for this patient.  Can we let her know that I talked with Kush and we are checking with her insurance  ----- Message -----  From: Tamra Duong MD  Sent: 3/29/2021   5:04 AM CDT  To: MD Elizabeth Vazquez!  I am absolutely fine with that.  Unfortunately her allergen testing was negative but she had elevated eos despite steroids and is a really hard to manage asthmatic. I know she is very compliant with her treatment. Lets see how she does with this!  Hope you're well.  -S  ----- Message -----  From: Elizabeth Marie MD  Sent: 3/26/2021  10:28 AM CDT  To: Tamra Duong MD    I am thinking to start Nucala/Fasenra on this patient.  Thoughts?  I don't think there are other etiologies for eosinophilia and think it could improve her breathing.  Patient is okay with starting.  Please let me know.  Hope you are well!    Elizabeth

## 2021-06-16 NOTE — TELEPHONE ENCOUNTER
Telephone Encounter by Martine Bowman RN at 8/9/2019  4:00 PM     Author: Martine Bowman RN Service: -- Author Type: Registered Nurse    Filed: 8/9/2019  4:05 PM Encounter Date: 8/9/2019 Status: Signed    : Martine Bowman RN (Registered Nurse)       Reached out to patient's son, Rhys, with recommendation. He verbalized understanding and is in agreement of plan for Holter monitor. No further questions at this time. Call transferred to  for Holter Monitor appointment.  ----------------------------------------------  Del Hirsch MD  You 11 minutes ago (3:47 PM)      Lets arrange for patient to have a Holter monitor to exclude paroxysms of atrial fibrillation.  Increase heart rate is you pointing out may simply be related to COPD and possibly prednisone administration.  Thanks.

## 2021-06-16 NOTE — TELEPHONE ENCOUNTER
Telephone Encounter by Addis Camarena at 2/18/2019  8:26 AM     Author: Addis Camarena Service: -- Author Type: --    Filed: 2/18/2019  8:27 AM Encounter Date: 2/11/2019 Status: Signed    : Addis Camarena       PRIOR AUTHORIZATION DENIED    Denial Rational:       Appeal Information:

## 2021-06-16 NOTE — TELEPHONE ENCOUNTER
Dr. Marie  This person called and is requesting a call back:    Name Of Person Who Called: Kulwant  Why Did The Person Call: allergy shots and serum questions     Best Phone Number To Call Back: [phone]    Okay To Leave A Detailed Voicemail? yes    Thank you.

## 2021-06-16 NOTE — PROGRESS NOTES
"        Subjective   Kulwant Amin is 51 y.o. and presents today for the following health issues   HPI chief complaint: Asthma and allergies    History of present illness: This is a pleasant 51-year-old woman that I was asked to see for evaluation by Dr. Tamra Duong in regards to allergies.  Patient has been closely followed by Dr. Duong for asthma.  She has a history of moderate severe persistent asthma and has been hospitalized multiple times.  She also has bronchiectasis.  She has been managed with Spiriva as well and on montelukast and Breo.  She was noted to have eosinophilia on her CBC from February.  Eosinophil count was 800.  She has currently pending specific IgE test for environmental allergens.  She has itchy eyes at times but no nasal congestion or drainage but states she has constant shortness of breath.  She states is present throughout the day and it is difficult to sleep at night.  She is wondering if there are any other options to improve her breathing.    Past medical history: Reflux, type 2 diabetes, coronary artery disease, hyperlipidemia, latent TB, depression, stroke    Social history: She lives with her children, no pets at home, non-smoker, no exposure to mold    Family history: Negative for allergies and asthma          Review of Systems  Review of Systems performed as above and the remainder is negative.      Objective    Pulse 81   Resp 22   Ht 5' 3\" (1.6 m)   Wt 121 lb (54.9 kg)   SpO2 98%   BMI 21.43 kg/m    Body mass index is 21.43 kg/m .  Physical Exam  Gen: Pleasant female not in acute distress  HEENT: Eyes no erythema of the bulbar or palpebral conjunctiva, no edema. Ears: TMs well visualized, no effusions. Nose: No congestion, mucosa normal. Mouth: Throat clear, no lip or tongue edema.   Cardiac: Regular rate and rhythm, no murmurs, rubs or gallops  Respiratory: End expiratory wheezing  Lymph: No supraclavicular or cervical lymphadenopathy  Skin: No rashes or lesions  Psych: " Alert and oriented times 3        Impression report and plan:    1.  Moderate to severe persistent asthma  2.  Eosinophilia    I will await her specific IgE testing.  I would like to work-up her eosinophilia for parasitic disease as I think she would be a good candidate for a biologic such as Fasenra or Nucala maybe even Dupixent.  I went over the risk and benefits of these medications and the patient would like to proceed with possible approval.  She has been in the hospital several times for her breathing.  I will discuss her case with Dr. Duong as well.

## 2021-06-16 NOTE — PROGRESS NOTES
ASSESMENT AND PLAN:  Diagnoses and all orders for this visit:    Moderate persistent asthma  Continue current regimen.  No wheezing on today's exam.    Prediabetes  Recheck A1c in 2 months.  Advised to check resting blood sugar once a day and bring the log with her next time.    Migraine headache  She takes amitriptyline 10 mg daily for prophylaxis.  She states she was getting to 3 headaches a week.  Improving with amitriptyline.    S/P coronary artery stent placement  -     clopidogrel (PLAVIX) 75 mg tablet; Take 1 tablet (75 mg total) by mouth daily.  Dispense: 90 tablet; Refill: 1  Continue cardiac rehab and keep appointments with cardiology.  Refilled Plavix per request.      All medications reviewed with the patient.  She is taking all medications as prescribed.    SUBJECTIVE: Kulwant Amin 48-year-old female with multiple medical conditions including asthma, gray headaches, hypertension, coronary artery disease here for follow-up.    She was recently admitted to [facility] from 2/28/2018 to 3/1/18 due to chest pain.  She was admitted for observation due to recent cardiac procedures and her history.  It was concluded the chest pain was most likely GI related.  She states chest pain has improved.  She is currently taking omeprazole and sucralfate.  No epigastric pain, nausea or vomiting since hospital discharge.    She was  admitted twice in January this year.    Patient underwent coronary angiogram on 1/25/2018 at [facility] with stent placement.  Been going to cardiac rehab twice a week and taking medications as prescribed.    No worsening shortness of breath or wheezing since last office visit.  She is using all inhalers as prescribed.  No fever or nausea.    She has history of prediabetes.  A1c was 6.2, 1 month ago.  She is not taking any medications.  She has been monitoring her blood sugar once or twice a day fasting and after meals.  States it has been high lately in the high 100s with  occasional above 200s.             Past Medical History:   Diagnosis Date     Anxiety      Arthritis      Asthma      Back pain      COPD (chronic obstructive pulmonary disease)      Depression      Diabetes mellitus      Generalized headaches      HTN (hypertension)      Pneumonia      SVT (supraventricular tachycardia)      Patient Active Problem List   Diagnosis     HTN (hypertension)     COPD (chronic obstructive pulmonary disease)     Pain     GERD (gastroesophageal reflux disease)     Prediabetes     SVT (supraventricular tachycardia)     Asthma without status asthmaticus     CAD (coronary artery disease)     Acute blood loss anemia     Abdominal pain     Acute midline low back pain without sciatica     S/P coronary artery stent placement     Moderate persistent asthma     Migraine headache       Allergies:  No Known Allergies    History   Smoking Status     Former Smoker     Quit date: 2003   Smokeless Tobacco     Never Used       Review of systems otherwise negative except as listed in HPI.   History   Smoking Status     Former Smoker     Quit date: 2003   Smokeless Tobacco     Never Used       OBJECTICE: /70 (Patient Site: Left Arm, Patient Position: Sitting, Cuff Size: Adult Regular)  Pulse 97  Temp 98.1  F (36.7  C) (Oral)   Wt 129 lb 3 oz (58.6 kg) Comment: w/shoes  SpO2 99%  BMI 22.88 kg/m2          GEN-alert,  in no apparent distress.  HEENT-mucous membranes are moist, neck is supple.  CV-regular rate and rhythm with no murmur.   RESP-lungs clear to auscultation .  ABDOMEN- Soft , not tender.  EXTREM- No edema. No joint swelling or tenderness.   SKIN-normal    This transcription uses voice recognition software, which may contain typographical errors.        Adrien Cardoza   3/8/2018

## 2021-06-16 NOTE — PROGRESS NOTES
OUTPATIENT VISIT NOTE                                                   Date of Visit: 3/22/2021     Chief Complaint   Chief Complaint   Patient presents with     Eye Problem     Red and sore. Wears contact lenses.  Onset 2-3 days ago.         History of Present Illness   Kulwant Amin is a 51 y.o. female with daughter and phone  c/o right eye redness for the last 3 days. Has had some discharge  She has intraocular lens implant.  No fever.  No stuffy nose.  No ear pain.  No cough.       MEDICATIONS   Current Outpatient Medications on File Prior to Visit   Medication Sig Dispense Refill     albuterol (PROVENTIL) 2.5 mg /3 mL (0.083 %) nebulizer solution Take 3 mL (2.5 mg total) by nebulization every 6 (six) hours as needed for wheezing. 150 mL 12     albuterol (VENTOLIN HFA) 90 mcg/actuation inhaler Inhale 2 puffs every 6 (six) hours as needed for wheezing. 1 Inhaler 12     amitriptyline (ELAVIL) 10 MG tablet TAKE 2 TABLETS (20 MG TOTAL) BY MOUTH AT BEDTIME. 180 tablet 3     aspirin 325 MG tablet Take 1 tablet (325 mg total) by mouth daily with breakfast. 30 tablet 11     atorvastatin (LIPITOR) 80 MG tablet TAKE 1 TABLET (80 MG TOTAL) BY MOUTH AT BEDTIME FOR CHOLESTEROL 30 tablet 12     blood glucose test strips Use 1 each As Directed 3 (three) times a day. Dispense brand per patient's insurance at pharmacy discretion.(E11.9) 300 strip 3     clopidogreL (PLAVIX) 75 mg tablet TAKE 1 TABLET (75 MG TOTAL) BY MOUTH DAILY. 90 tablet 1     flash glucose scanning reader (FREESTYLE MONICA 14 DAY READER) Misc Use 1 Units As Directed every 8 (eight) hours. 1 each 0     flash glucose sensor (FREESTYLE MONICA 14 DAY SENSOR) Kit Use 1 Units As Directed every 14 (fourteen) days. 6 kit 3     gabapentin (NEURONTIN) 300 MG capsule TAKE 1 CAPSULE (300 MG TOTAL) BY MOUTH 2 (TWO) TIMES A DAY FOR NERVE PAIN 180 capsule 3     hydroCHLOROthiazide (MICROZIDE) 12.5 mg capsule TAKE 1 CAPSULE (12.5 MG TOTAL) BY MOUTH DAILY FOR BLOOD  "PRESSURE 90 capsule 2     metFORMIN (GLUCOPHAGE) 850 MG tablet Take 1 tablet (850 mg total) by mouth 2 (two) times a day with meals. 60 tablet 11     metoprolol tartrate (LOPRESSOR) 25 MG tablet TAKE 1 TABLET (25 MG TOTAL) BY MOUTH 2 (TWO) TIMES A DAY FOR BLOOD PRESSURE 60 tablet 3     montelukast (SINGULAIR) 10 mg tablet TAKE 1 PILL (10 MG TOTAL) BY MOUTH AT BEDTIME FOR ASTHMA 30 tablet 10     omeprazole (PRILOSEC) 40 MG capsule Take 40 mg by mouth daily before breakfast.       sucralfate (CARAFATE) 1 gram tablet Take 1 tablet (1 g total) by mouth 4 (four) times a day before meals and at bedtime. Take 1 hour prior to meals 120 tablet 11     tiotropium (SPIRIVA) 18 mcg inhalation capsule Place 1 capsule (2 puffs total) into inhaler and inhale daily. Place 1 capsule into the inhaler device. Close and press button to crush the capsule. Inhale the contents of the crushed capsule in 2 breaths. 30 capsule 11     [DISCONTINUED] meclizine (ANTIVERT) 25 mg tablet Take 1 tablet (25 mg total) by mouth 3 (three) times a day as needed for dizziness. 28 tablet 0     acetaminophen (TYLENOL) 500 MG tablet TAKE 1 PILL BY MOUTH EVERY 6 HOURS AS NEEDED FOR PAIN 90 tablet 10     aspirin 81 MG EC tablet 1 tablet daily.       BD ULTRA-FINE SHORT PEN NEEDLE 31 gauge x 5/16\" Ndle see administration instructions.       blood glucose meter (GLUCOMETER) Use 1 each As Directed 3 (three) times a day. Dispense glucometer brand per patient's insurance at pharmacy discretion.(E11.9) 1 each 0     colchicine 0.6 mg tablet TAKE 1 TABLET (0.6 MG TOTAL) BY MOUTH DAILY 90 tablet 0     ferrous sulfate 325 (65 FE) MG tablet Take 1 tablet (325 mg total) by mouth daily with breakfast. 90 tablet 3     fluticasone furoate-vilanteroL (BREO ELLIPTA) 200-25 mcg/dose DsDv inhaler Inhale 1 puff daily. 1 each 12     generic lancets Use 1 each As Directed 3 (three) times a day. Dispense brand per patient's insurance at pharmacy discretion.(E11.9) 300 each 3     " "INJECT EASE LANCETS 30 gauge Misc USE 1 EACH AS DIRECTED THREE TIMES A DAY *34 DAYS* 100 each 11     insulin aspart U-100 (NOVOLOG) 100 unit/mL injection Inject under the skin 3 (three) times a day with meals. Use per sliding scale < 150 - No insulin, 151-200 use 3 U, 201- 250 use 5 U,   251- 300 use 8 U        insulin syringe-needle U-100 0.3 mL 30 x 3/8\" Syrg Use as needed sliding scale insulin as directed. 100 Syringe 0     [] nystatin (MYCOSTATIN) 100,000 unit/mL suspension Take 5 mL (500,000 Units total) by mouth 4 (four) times a day for 10 days. 200 mL 0     polyethylene glycol (GLYCOLAX) 17 gram/dose powder MIX 17 GRAMS WITH LIQUID AND DRINK ONCE DAILY FOR CONSTIPATION 510 g 12     predniSONE (DELTASONE) 2.5 MG tablet 1 tablet daily.       No current facility-administered medications on file prior to visit.          SOCIAL HISTORY   Social History     Tobacco Use     Smoking status: Former Smoker     Types: Cigarettes     Quit date:      Years since quittin.2     Smokeless tobacco: Never Used     Tobacco comment: No passive exposure   Substance Use Topics     Alcohol use: No           Physical Exam   Vitals:    21 1241   BP: 104/62   Patient Site: Right Arm   Patient Position: Sitting   Cuff Size: Adult Regular   Pulse: 96   Temp: 98.2  F (36.8  C)   TempSrc: Oral   SpO2: 96%   Weight: 131 lb 1.6 oz (59.5 kg)   Height: 5' 1.61\" (1.565 m)      GEN:  NAD  Eyes:  Pupils equal round and reactive to light.  Small subconjunctival hemorrhage at 7 o'clock right eye.    bilateral eyes stained with fluorescein strip.  No corneal abrasion on left.  Right has a couple of areas of increased uptake  Lower lid everted.  No foreign body.  Upper lid everted.  No foreign body.  Tolerated procedure well.         Assessment and Plan   1. Abrasion of right cornea, initial encounter  Ambulatory referral to Ophthalmology   2. Dizziness  meclizine (ANTIVERT) 25 mg tablet     Apparent small subconjunctival " hemorrhage on the right eye.  She has a couple of areas of faint fluoroscein uptake on the right cornea, that might be healing corneal abrasions.  With her subjective vision blurring, she needs to be seen by ophthalmology.    She asked for refill of meclizine which was given.      Discussed signs / symptoms that warrant urgent / emergent medical attention.     Recheck if worsening or not improving.       Jose Rafael Meadows MD        Pertinent History     The following portions of the patient's history were reviewed and updated as appropriate: allergies, current medications, past family history, past medical history, past social history, past surgical history and problem list.

## 2021-06-17 NOTE — TELEPHONE ENCOUNTER
Chart reviewed. There are refills at pharmacy. CMT team will need to call pharmacy first and verify there are no issues at pharmacy and then update caller.

## 2021-06-17 NOTE — TELEPHONE ENCOUNTER
Telephone Encounter by Rosamaria Avalos at 3/31/2021 12:40 PM     Author: Rosamaria Avalos Service: -- Author Type: Financial Resource Guide    Filed: 4/1/2021  5:57 AM Encounter Date: 3/31/2021 Status: Addendum    : Rosamaria Avalos (Financial Resource Guide)    Related Notes: Original Note by Rosamaria Avalos (Financial Resource Guide) filed at 3/31/2021 12:45 PM       Prior Authorization Approval  Medication: Fasenra 30mg/ml  Qty: 1 pen for 28 days for the first  3 months then 1 pen for 56 days thereafter  Effective Dates: 3/1/21 - /27/21  Reference Number: [id number]  Insurance Company: [employer] ([facility])  Pharmacy: restricted to [facility]  Expected Copay: not covered at this location  Copay Card Available: not elg  Foundation Assistance Needed/Available: not elg  Patient Assistance Program Needed: not needed  Patient Notified? Yes  Pharmacy Notified? Yes

## 2021-06-17 NOTE — TELEPHONE ENCOUNTER
Daughter in law is calling Billy and states that Kulwant is out of Miralax.  Billy went to [facility] yesterday and was told that there is no refills.  FNA advised that there was a prescription dated 3/16/2021.   FNA phoned [facility] and left voice mail that prescription was sent on 3/16/2021.  Pharmacy not open at this time.   Daughter in law is requesting to speak with MD Cardoza regarding medication.  Please phone Billy/Kulwant today.    COVID 19 Nurse Triage Plan/Patient Instructions    Please be aware that novel coronavirus (COVID-19) may be circulating in the community. If you develop symptoms such as fever, cough, or SOB or if you have concerns about the presence of another infection including coronavirus (COVID-19), please contact your health care provider or visit  [email or web address].    Disposition/Instructions    Home care recommended. Follow home care protocol based instructions.    Thank you for taking steps to prevent the spread of this virus.  o Limit your contact with others.  o Wear a simple mask to cover your cough.  o Wash your hands well and often.    Resources    [facility]: About COVID-19: [email or web address]    CDC: What to Do If You're Sick: www.cdc.gov/coronavirus/2019-ncov/about/steps-when-sick.html    CDC: Ending Home Isolation: www.cdc.gov/coronavirus/2019-ncov/hcp/disposition-in-home-patients.html     CDC: Caring for Someone: www.cdc.gov/coronavirus/2019-ncov/if-you-are-sick/care-for-someone.html     [facility]: Interim Guidance for Hospital Discharge to Home: www.health.[facility].mn.us/diseases/coronavirus/hcp/hospdischarge.pdf    [facility] clinical trials (COVID-19 research studies): [email or web address]-trials     Below are the COVID-19 hotlines at the [place] Department of Health ([facility]). Interpreters are available.   o For health questions: Call 390-377-5727 or 1-264.372.8828 (7 a.m. to 7 p.m.)  o For questions about schools and  childcare: Call 709-476-1768 or 1-354.346.8399 (7 a.m. to 7 p.m.)     Reason for Disposition    Request for URGENT new prescription or refill of 'essential' medication (i.e., likelihood of harm to patient if not taken) and triager unable to fill per department policy    Additional Information    Negative: MORE THAN A DOUBLE DOSE of a prescription or over-the-counter (OTC) drug    Negative: DOUBLE DOSE (an extra dose or lesser amount) of over-the-counter (OTC) drug and any symptoms (e.g., dizziness, nausea, pain, sleepiness)    Negative: DOUBLE DOSE (an extra dose or lesser amount) of prescription drug and any symptoms (e.g., dizziness, nausea, pain, sleepiness)    Negative: Took another person's prescription drug    Negative: DOUBLE DOSE (an extra dose or lesser amount) of prescription drug and NO symptoms (Exception: a double dose of antibiotics)    Negative: Diabetes drug error or overdose (e.g., took wrong type of insulin or took extra dose)    Negative: Caller has medication question about med not prescribed by PCP and triager unable to answer question (e.g., compatibility with other med, storage)    Protocols used: MEDICATION QUESTION CALL-A-[place]

## 2021-06-17 NOTE — PROGRESS NOTES
ASSESMENT AND PLAN:  Diagnoses and all orders for this visit:    Moderate persistent asthma  At base line.  Seen by pulmonology and 11/17.  Due for follow-up.  Daughter-in-law will call and make appointment.    Type 2 diabetes mellitus treated without insulin  -     Glycosylated Hemoglobin A1c  -     metFORMIN (GLUCOPHAGE) 500 MG tablet; Take 1 tablet (500 mg total) by mouth 2 (two) times a day with meals.  Dispense: 60 tablet; Refill: 5  Newly diagnosed diabetes, was prediabetic.  Started on metformin.  They will continue monitoring blood sugar at home.  She will follow-up in 1 month.    GERD (gastroesophageal reflux disease)  No improvement with Zantac.  Back on omeprazole and sucralfate.    HTN (hypertension)  Blood pressure controlled with Lopressor 25 mg daily.    S/P coronary artery stent placement  Managed by cardiology.    SVT (supraventricular tachycardia)  On monitor until 4/20/2011.  Daughter-in-law states cardiology will call for follow-up appointment.    Polypharmacy  Appreciate help from home health nurse.        SUBJECTIVE: Kulwant Amin is a 48-year-old female with history of asthma, hypertension, coronary artery disease, GERD and history of SVT here for follow-up.  She was recently seen in the ER 1 week ago with cough and wheezing.  She was released from the ER with 1 dose of Solu-Medrol and sent home with oral prednisone.  She states she is back to her baseline now.  She back on her daily meds.    She was recently seen by   cardiology on 3/19/2018 and was placed on monitor for 1 month.  She denied chest pain or palpitations since last visit to cardiology.    No new complaints or concerns today.    She is prediabetic and we have been monitoring with A1c for the past few months.  Her last A1c was 6.2 in January 2018.  Daughter-in-law has been checking blood sugar at home.  States her fasting sugar ranges from 140s-160.  She had the highest blood sugar over 400 before meal a couple weeks ago.  She  "denied low blood sugar with hypoglycemic symptoms.  She is not on any diabetic medications currently.  No acute abdominal pain, nausea, dizziness or confusions today.        Past Medical History:   Diagnosis Date     Anxiety      Arthritis      Asthma      Back pain      COPD (chronic obstructive pulmonary disease)      Depression      Diabetes mellitus      Generalized headaches      HTN (hypertension)      Pneumonia      SVT (supraventricular tachycardia)      Patient Active Problem List   Diagnosis     HTN (hypertension)     COPD (chronic obstructive pulmonary disease)     Pain     GERD (gastroesophageal reflux disease)     Prediabetes     SVT (supraventricular tachycardia)     Asthma without status asthmaticus     CAD (coronary artery disease)     Acute blood loss anemia     Abdominal pain     Acute midline low back pain without sciatica     Type 2 diabetes mellitus treated without insulin     S/P coronary artery stent placement     Moderate persistent asthma     Migraine headache       Allergies:  No Known Allergies    History   Smoking Status     Former Smoker     Quit date: 2003   Smokeless Tobacco     Never Used       Review of systems otherwise negative except as listed in HPI.   History   Smoking Status     Former Smoker     Quit date: 2003   Smokeless Tobacco     Never Used       OBJECTICE: /74 (Patient Site: Right Arm, Patient Position: Sitting, Cuff Size: Adult Regular)  Pulse (!) 106  Temp 98.2  F (36.8  C) (Oral)   Resp 20  Ht 5' 3\" (1.6 m)  Wt 131 lb 6 oz (59.6 kg)  SpO2 99%  BMI 23.27 kg/m2          GEN-alert,  in no apparent distress.  HEENT-mucous membranes are moist, neck is supple.  CV-regular rate and rhythm with no murmur.   RESP-no wheezing today.  No crackles or rhonchi.  ABDOMEN- Soft , not tender.  EXTREM- No edema.  NEURO- Grossly normal.  SKIN-normal    This transcription uses voice recognition software, which may contain typographical errors.          Adrien Cardoza   4/6/2018 "

## 2021-06-17 NOTE — TELEPHONE ENCOUNTER
Billy notified. The patient verbalizes understanding of provider/CSS instructions for follow-up and continued care per provider message.

## 2021-06-17 NOTE — PROGRESS NOTES
ASSESMENT AND PLAN:  Diagnoses and all orders for this visit:    Type 2 diabetes mellitus treated without insulin (H)  -     Glycosylated Hemoglobin A1C  -     Microalbumin, Random Urine  -     HIV Antigen/Antibody Screening [place]  -     Mammo Screening Bilateral  -     Basic Metabolic Panel  -     Lipid Cascade RANDOM    Pure hypercholesterolemia  Recheck today.    Essential hypertension  Controlled.    Coronary artery disease due to lipid rich plaque  Managed by GI.    Moderate major depression (H)  Stable    Chronic nonintractable headache, unspecified headache type  Continue amitriptyline.    Chronic obstructive pulmonary disease, unspecified COPD type (H)  Managed by pulmonology.  Has appointment today.    H/O: stroke  No residual weakness.    This transcription uses voice recognition software, which may contain typographical errors.        SUBJECTIVE: Kulwant Amin is a 51-year-old female with history of multiple chronic medical conditions including but not limited to type 2 diabetes, hyperlipidemia, hypertension, depression, coronary artery disease and headache here for medication review.  She has home care RN setting up her pillbox every 2 weeks.  She is here with her daughter-in-law.  Patient and daughter-in-law states patient has been doing well lately, at her baseline.  No recent hospital or ED visit.  She forgot to bring her blood sugar log but states her blood sugar are in the 140- 150 range.    She sees cardiology regularly.  Last visit was in 2/21, note reviewed, no medication changes were made.  Recommended to follow-up in 1 year.    She sees pulmonology regularly.  She was referred to allergy.  She states she is using injectable allergy medication currently.  She has appointment with pulmonology today.    No new concerns or complaints since last visit.        Past Medical History:   Diagnosis Date     Acute asthma exacerbation 1/6/2020     Acute blood loss anemia      Anxiety      Arthritis       Asthma      Asthma exacerbation 2015     Chest wall pain 2017    Added automatically from request for surgery [id number]     COPD (chronic obstructive pulmonary disease) (H)      Coronary artery disease due to lipid rich plaque 2018     Depression      Diabetes mellitus, type II (H)      Essential hypertension 2017     Generalized headaches      GERD (gastroesophageal reflux disease)      History of anesthesia complications     nausea and vomiting     Lactic acid acidosis 2018     Lower GI bleeding      Nonspecific chest pain 2018    2 negative stress tests since coronary stenting in ; in 2020 she had 1 week of this pain with normal cardiac enzymes and relief with Toradol     Pneumonia      SVT (supraventricular tachycardia) (H)      Patient Active Problem List   Diagnosis     GERD (gastroesophageal reflux disease)     Essential hypertension     Coronary artery disease due to lipid rich plaque     Type 2 diabetes mellitus treated without insulin (H)     Moderate persistent asthma     Migraine headache     Hyperlipidemia     Nonspecific chest pain     SOB (shortness of breath)     Microcytic anemia     Latent tuberculosis     Dizziness     Chronic nonintractable headache, unspecified headache type     Chronic obstructive pulmonary disease, unspecified COPD type (H)     Bronchiectasis (H)     Cataracts, bilateral     Dental decay     Immigrant with language difficulty     Anxiety     Chest pain     Pneumonia     Moderate major depression (H)     Right pontine stroke (H)       Allergies:  No Known Allergies    Social History     Tobacco Use   Smoking Status Former Smoker     Types: Cigarettes     Quit date:      Years since quittin.3   Smokeless Tobacco Never Used   Tobacco Comment    No passive exposure       Review of systems otherwise negative except as listed in HPI.   Social History     Tobacco Use   Smoking Status Former Smoker     Types: Cigarettes     Quit date:  "2003     Years since quittin.3   Smokeless Tobacco Never Used   Tobacco Comment    No passive exposure       OBJECTICE: /64 (Patient Site: Right Arm, Patient Position: Sitting, Cuff Size: Adult Regular)   Pulse 77   Temp 98  F (36.7  C) (Oral)   Resp 20   Ht 5' 1.61\" (1.565 m)   Wt 127 lb 8 oz (57.8 kg)   SpO2 97%   BMI 23.62 kg/m      DATA REVIEWED:  Additional History from Old Records Summarized (2):   Labs Reviewed or Ordered (1):       GEN-alert,  in no apparent distress.  HEENT-mucous membranes are moist, neck is supple.  CV-regular rate and rhythm with no murmur.   RESP-mild wheezing.  No crackles or rhonchi.  ABDOMEN- Soft , not tender.  EXTREM- No open lesions or ulcers.  Sensation intact.  SKIN-normal        Adrien Cardoza   2021     "

## 2021-06-17 NOTE — TELEPHONE ENCOUNTER
Refill Request  Did you contact pharmacy: No  Medication name:   Requested Prescriptions     Pending Prescriptions Disp Refills     blood glucose test strips 300 strip 3     Sig: Use 1 each As Directed 3 (three) times a day. Dispense brand per patient's insurance at pharmacy discretion.(E11.9)     Who prescribed the medication: Dr. Cardoza   Requested Pharmacy: [facility]  Is patient out of medication: UNSPECIFIED  Patient notified refills processed in 3 business days:  yes  Okay to leave a detailed message: yes

## 2021-06-17 NOTE — TELEPHONE ENCOUNTER
Telephone Encounter by Rosamaria Avalos at 3/31/2021 12:33 PM     Author: Rosamaria Avalos Service: -- Author Type: Financial Resource Guide    Filed: 3/31/2021 12:45 PM Encounter Date: 3/31/2021 Status: Signed    : Rosamaria Avalos (Financial Resource Guide)       PA Initiation  Medication: Fasenra 30mg/ml  QTY: 1 pen for 28 days for the first 3 months and then 1 pen for 56 days thereafter  Insurance Company: [employer] scripts ([employer] PMAP)  Pharmacy Filing Rx: pending  Filling Pharmacy Phone: [facility]  Filling Pharmacy Fax: NA  Start Date: 3/31/21  Additional Information: [facility]

## 2021-06-17 NOTE — PROGRESS NOTES
Assessment/Plan:    50 y.o.-year-old woman with history of organic fuel exposure in the home; visit was conducted through a [place] .  Her PFTs have been previously noted not to be diagnostic due to poor effort, additionally, she has iron deficiency with significant anemia and is not entirely clear how significantly her anemia is contributing to her shortness of breath.  Was evaluated by Dr. Marie and initiated on Dupixent and has been doing significantly better since then.  For the present we would recommend;    Continue Breo Ellipta 1 puff daily.  She knows to gargle after using this.    Continue Spiriva.    Continue to use 3 times daily albuterol nebs.    Continue 2.5 mg of prednisone daily.    Continue Protonix 40 mg daily.    She is up-to-date with her influenza vaccination.    Reminded the daughter-in-law that the patient is supposed to do monthly Dupixent subcu injections for the first 3 months and then transition over to 8 weekly injections.    She has not yet received her Covid-19 vaccine and I will clarify with Dr. Marie if it is okay for her to receive this but from my perspective she is at high risk if she does contract Covid-19 and should certainly be vaccinated.    Tamra Scruggsqvi  Pulmonary and Critical Care  2048      Subjective:    Patient ID: Ms. Amin is a 49 y.o.female with a past medical history significant for anxiety, arthritis, questionable asthma versus COPD, diabetes, hypertension and a prior history of SVT presents with a follow-up visit for her pulmonary complaints.  She follows with me fairly closely for her moderate-persistent, difficult to control asthma symptoms.  Since her last clinic visit with me, she was evaluated by Dr. Marie in the allergy clinic and initiated on Dupixent subcu injections.  She has been feeling significantly better since initiating this and although she is still short of breath she is no longer wheezing and her chest does not feel as heavy as it did  "before.  She does note that she had a mild febrile episode with the duplex and for then felt better soon thereafter.  She continues to be compliant with her triple bronchodilator therapy with Spiriva, Breo and as needed albuterol.  Additionally she continues to be compliant with her montelukast and omeprazole.    Pertinent past medical history:  48-year-old female here for follow-up.  Her breathing is a bit worse than 6 months ago.  She had multiple ER visits and evaluations.  This includes negative PE study.  She had a cath with a 75% LAD lesion which was intervened upon.  Since her catheterization she feels she has more heartburn.  Her breathing is worse with exertion.  Improves with rest.  It is also worse with cold weather.  Warm weather and humid air does not bother.  Symptoms localized to the chest.  Pertinent positives include burning sensation in the chest.  Pertinent negatives include no fever or hemoptysis.    Current Outpatient Medications   Medication Sig Dispense Refill     acetaminophen (TYLENOL) 500 MG tablet TAKE 1 PILL BY MOUTH EVERY 6 HOURS AS NEEDED FOR PAIN 90 tablet 10     albuterol (PROVENTIL) 2.5 mg /3 mL (0.083 %) nebulizer solution Take 3 mL (2.5 mg total) by nebulization every 6 (six) hours as needed for wheezing. 150 mL 12     albuterol (VENTOLIN HFA) 90 mcg/actuation inhaler Inhale 2 puffs every 6 (six) hours as needed for wheezing. 1 Inhaler 12     amitriptyline (ELAVIL) 10 MG tablet TAKE 2 TABLETS (20 MG TOTAL) BY MOUTH AT BEDTIME. 180 tablet 3     aspirin 325 MG tablet Take 1 tablet (325 mg total) by mouth daily with breakfast. 30 tablet 11     aspirin 81 MG EC tablet 1 tablet daily.       atorvastatin (LIPITOR) 80 MG tablet TAKE 1 TABLET (80 MG TOTAL) BY MOUTH AT BEDTIME FOR CHOLESTEROL 30 tablet 12     BD ULTRA-FINE SHORT PEN NEEDLE 31 gauge x 5/16\" Ndle see administration instructions.       benralizumab 30 mg/mL atIn Inject 30 mg under the skin every 2 (two) months. Inject monthly " "for first 3 doses 1 Syringe 0     blood glucose meter (GLUCOMETER) Use 1 each As Directed 3 (three) times a day. Dispense glucometer brand per patient's insurance at pharmacy discretion.(E11.9) 1 each 0     blood glucose test strips Use 1 each As Directed 3 (three) times a day. Dispense brand per patient's insurance at pharmacy discretion.(E11.9) 300 strip 3     clopidogreL (PLAVIX) 75 mg tablet TAKE 1 TABLET (75 MG TOTAL) BY MOUTH DAILY. 90 tablet 1     colchicine 0.6 mg tablet TAKE 1 TABLET (0.6 MG TOTAL) BY MOUTH DAILY 90 tablet 0     ferrous sulfate 325 (65 FE) MG tablet Take 1 tablet (325 mg total) by mouth daily with breakfast. 90 tablet 3     flash glucose scanning reader (FREESTYLE MONICA 14 DAY READER) Misc Use 1 Units As Directed every 8 (eight) hours. 1 each 0     flash glucose sensor (FREESTYLE MONICA 14 DAY SENSOR) Kit Use 1 Units As Directed every 14 (fourteen) days. 6 kit 3     fluticasone furoate-vilanteroL (BREO ELLIPTA) 200-25 mcg/dose DsDv inhaler Inhale 1 puff daily. 1 each 12     gabapentin (NEURONTIN) 300 MG capsule TAKE 1 CAPSULE (300 MG TOTAL) BY MOUTH 2 (TWO) TIMES A DAY FOR NERVE PAIN 180 capsule 3     generic lancets Use 1 each As Directed 3 (three) times a day. Dispense brand per patient's insurance at pharmacy discretion.(E11.9) 300 each 3     hydroCHLOROthiazide (MICROZIDE) 12.5 mg capsule TAKE 1 CAPSULE (12.5 MG TOTAL) BY MOUTH DAILY FOR BLOOD PRESSURE 90 capsule 2     INJECT EASE LANCETS 30 gauge Misc USE 1 EACH AS DIRECTED THREE TIMES A DAY *34 DAYS* 100 each 11     insulin aspart U-100 (NOVOLOG) 100 unit/mL injection Inject under the skin 3 (three) times a day with meals. Use per sliding scale < 150 - No insulin, 151-200 use 3 U, 201- 250 use 5 U,   251- 300 use 8 U        insulin syringe-needle U-100 0.3 mL 30 x 3/8\" Syrg Use as needed sliding scale insulin as directed. 100 Syringe 0     meclizine (ANTIVERT) 25 mg tablet Take 1 tablet (25 mg total) by mouth 3 (three) times a day as " "needed for dizziness. 28 tablet 0     metFORMIN (GLUCOPHAGE) 850 MG tablet Take 1 tablet (850 mg total) by mouth 2 (two) times a day with meals. 60 tablet 11     metoprolol tartrate (LOPRESSOR) 25 MG tablet TAKE 1 TABLET (25 MG TOTAL) BY MOUTH 2 (TWO) TIMES A DAY FOR BLOOD PRESSURE 180 tablet 3     montelukast (SINGULAIR) 10 mg tablet TAKE 1 PILL (10 MG TOTAL) BY MOUTH AT BEDTIME FOR ASTHMA 30 tablet 10     omeprazole (PRILOSEC) 40 MG capsule Take 40 mg by mouth daily before breakfast.       polyethylene glycol (GLYCOLAX) 17 gram/dose powder MIX 17 GRAMS WITH LIQUID AND DRINK ONCE DAILY FOR CONSTIPATION 510 g 12     predniSONE (DELTASONE) 2.5 MG tablet 1 tablet daily.       SPIRIVA WITH HANDIHALER 18 mcg inhalation capsule INHALE THE CONTENTS OF 1 CAPSULE DAILY 30 capsule 11     sucralfate (CARAFATE) 1 gram tablet Take 1 tablet (1 g total) by mouth 4 (four) times a day before meals and at bedtime. Take 1 hour prior to meals 120 tablet 11     benralizumab 30 mg/mL atIn Inject 30 mg under the skin every 2 (two) months. Every month for the first three doses then every 2months 3 Syringe 0     No current facility-administered medications for this visit.        Physical Exam   /62   Pulse 80   Ht 5' 1.5\" (1.562 m)   Wt 128 lb (58.1 kg)   SpO2 99%   Breastfeeding No   BMI 23.79 kg/m    Physical Exam   Constitutional: She is oriented to person, place, and time. She appears well-developed and well-nourished.   Tired appearing.   HENT:   Head: Normocephalic.   Eyes: Pupils are equal, round, and reactive to light.   Neck: Normal range of motion.   Cardiovascular: Normal rate and regular rhythm.   Pulmonary/Chest: Effort normal and breath sounds normal.   Abdominal: Soft.   Musculoskeletal: Normal range of motion.         General: No edema.   Neurological: She is alert and oriented to person, place, and time.   Skin: Skin is warm.   Psychiatric: She has a normal mood and affect.           Tamra Duong, " MD  Pulmonary and Critical Care  (P) [phone]

## 2021-06-17 NOTE — TELEPHONE ENCOUNTER
Telephone Encounter by Kimberlyn Castle at 12/22/2020  2:42 PM     Author: Kimberlyn Castle Service: -- Author Type: --    Filed: 12/22/2020  2:52 PM Encounter Date: 12/22/2020 Status: Signed    : Kimberlyn Castle       PRIOR AUTHORIZATION DENIED    Denial Rational: Must try/fail at least three preferred drugs Tudorza Pressair, Stiolto Respimat, Combivent Respimat, Atrovent HFA, Spiriva Respimat or Capsules (tried), Ipratropium neb (tried)      Appeal Information: This medication was denied. If physician would like to appeal because patient has contraindication or allergy to covered medication please write letter of medical necessity and route back to PA team to initiate.  If no further action is needed please close encounter thank you.

## 2021-06-17 NOTE — TELEPHONE ENCOUNTER
"Writer contacted [facility] to verify the patient's Miralax prescription. I spoke with pharmacy technician. Pharmacy staff verifies that they received our order and that there are refills on file. The prescription is in \"packaging\" so most likely the reason for delay.     CMT will call patient representative back to verify preferred pharmacy and provide the update as noted above. Pharmacy will have it ready within the hour.   "

## 2021-06-17 NOTE — TELEPHONE ENCOUNTER
Telephone Encounter by Kimberlyn Castle at 10/26/2020  2:59 PM     Author: Kimberlyn Castle Service: -- Author Type: --    Filed: 10/26/2020  2:59 PM Encounter Date: 10/26/2020 Status: Signed    : Kimberlyn Castle APPROVED:    Approval start date: 9/26/2020  Approval end date:  10/26/2021    Pharmacy has been notified of approval and will contact patient when medication is ready for pickup.

## 2021-06-17 NOTE — PROGRESS NOTES
"ITP ASSESSMENT   Assessment Day: 60 Day/DISCHARGE NOTE  Session Number: 14  Precautions: Standard/Falls  Diagnosis: Stent  Risk Stratification: Medium  Referring Provider: Dr. Serrano  EXERCISE  Exercise Assessment: Discharge    Tolerated 40' of ex. At 2.3-2-6 Mets                           Exercise Plan  Goals Next 30 days  Leisure: Walk 5-8 times per day ; 5-8'    Education Goals: All goals in this section met  Education Goals Met: Patient can state cardiac s/s and appropriate emergency response.;Has system for taking medication.;Medication review.                        Goals Met  30 day ADL'S goals met: Pt has not resumed helping with housework or light cooking  30 day Leisure goals met: Walks frequently throughout the day, 5' at at time  No Data Recorded  30 Day Progression: Reports ready to continue with exercise on her own    Initial ADL's goals met: Pt has not resumed light housework or cooking-goal not met  Initial Leisure goals met: Pt has started \"some\" home walking  No Data Recorded  Initial Progression: Pt states she is limited due to her \"asthma\" and \"leg and arm discomfort\"    RPE 11-14Resistive Training?: Yes    Current Exercise (mins/week): 110    Interventions  Home Exercise:  Mode: Walk  Frequency: 5-8 times per day  Duration: 5-8\"    Education Material : Educational videos;Provide written material;Individual education and counseling;Offer educational classes    Education Completed  Exercise Education Completed: Cardiac Anatomy;Signs and Symptoms;Medication review;RPE;Emergency Plan;Home Exercise;Warm up/cool down;FITT Principles;BP/HR Reponse to exercise;Benefits of Exercise;End point of exercise            Exercise Follow-up/Discharge  Follow up/Discharge: Reviewed HP with pt; SX of intolerance and emergency plan.   preent NUTRITION  Nutrition Assessment: Discharge    Nutrition Risk Factors:  Nutrition Risk Factors: Diabetes;Dyslipidemia    Nutrition Plan  Interventions  Other " "Nutrition Intervention: Therapist/Pt Discussion  Initial Rate Your Plate Score: 59    Education Completed  Nutrition Education Completed: Low Saturated fat diet;Low sodium diet    Goals  Nutrition Goals (Next 30 days): Patient will follow a low sodium diet;Patient will follow a low saturated fat diet    Goals Met  Nutrition Goals Met: Patient follows a low sodium diet;Patient states following a low saturated fat diet    Height, Weight, and  BMI  Weight: 132 lb (59.9 kg)  Height: 5' 3\" (1.6 m)  BMI: 23.39    Nutrition Follow-up  Follow-up/Discharge: Patient follows a heart healthy diet majority of the time.  She does not cook; a family member cooks.         Other Risk Factors  Other Risk Factor Assessment: Discharge    HTN Risk Factor: Hypertension    Pre Exercise BP: 112/68  Post Exercise BP: 100/56    Hypertension Plan  Goals  HTN Goals: Follow low sodium diet;Take medication as prescribed;Exercises regularly    Goals Met  HTN Goals Met: Take medication as prescribed    HTN Interventions  HTN Interventions: Diet consult;Therapist/patient discussion;Provide written material;Offer educational videos;Offer educational classes    HTN Education Completed  HTN Education Completed: Risk factor overview;Medication review    Tobacco Risk Factor: NA          Risk Factor Follow-up   Follow-up/Discharge: Pt and family verbalized understanding of risk factors   PSYCHOSOCIAL  Psychosocial Assessment: Discharge       Psychosocial Risk Factor: Stress    Psychosocial Plan  Interventions  Interventions: Offer educational videos and classes;Provide written material;Individual education and counseling    Education Completed  Education Completed: Effects of stress on body;Relaxation/Coping Techniques;S/S of depression    Goals  Goals (Next 30 days): Identify stressors;Improvement in [employer] score;Practicing stress management skills    Goals Met  Goals Met: Identified Support system;Oriented to stress management " classes    Psychosocial Follow-up  Follow-up/Discharge: Family is very supportive           Pt completed 14 Phase 2 Sessions;  Requested to be done.  Pt has met with the  Dietician,  Reviewed importance of home exercise  SX of intolerance, emergency plan.  Pt plans to walk  5-8' with her cane;  5-6 x daily.  Pt's family helps with ADL'S and Homemaking.      Signature: _____________________________________________________________    Date: __________________    Time: __________________See Doc Flowsheet

## 2021-06-18 ENCOUNTER — HOSPITAL ENCOUNTER (EMERGENCY)
Dept: EMERGENCY MEDICINE | Facility: HOSPITAL | Age: 51
Discharge: HOME | End: 2021-06-18
Attending: EMERGENCY MEDICINE
Payer: COMMERCIAL

## 2021-06-18 DIAGNOSIS — M54.41 ACUTE BILATERAL LOW BACK PAIN WITH RIGHT-SIDED SCIATICA: ICD-10-CM

## 2021-06-18 LAB
ALBUMIN SERPL-MCNC: 3.4 G/DL (ref 3.5–5)
ALP SERPL-CCNC: 85 U/L (ref 45–120)
ALT SERPL W P-5'-P-CCNC: 25 U/L (ref 0–45)
ANION GAP SERPL CALCULATED.3IONS-SCNC: 9 MMOL/L (ref 5–18)
AST SERPL W P-5'-P-CCNC: 26 U/L (ref 0–40)
BASOPHILS # BLD AUTO: 0 THOU/UL (ref 0–0.2)
BASOPHILS NFR BLD AUTO: 0 % (ref 0–2)
BILIRUB SERPL-MCNC: 0.4 MG/DL (ref 0–1)
BUN SERPL-MCNC: 9 MG/DL (ref 8–22)
C REACTIVE PROTEIN LHE: 0.4 MG/DL (ref 0–0.8)
CALCIUM SERPL-MCNC: 9 MG/DL (ref 8.5–10.5)
CHLORIDE BLD-SCNC: 106 MMOL/L (ref 98–107)
CO2 SERPL-SCNC: 24 MMOL/L (ref 22–31)
CREAT SERPL-MCNC: 0.63 MG/DL (ref 0.6–1.1)
EOSINOPHIL # BLD AUTO: 0 THOU/UL (ref 0–0.4)
EOSINOPHIL NFR BLD AUTO: 0 % (ref 0–6)
ERYTHROCYTE [DISTWIDTH] IN BLOOD BY AUTOMATED COUNT: 13.5 % (ref 11–14.5)
ERYTHROCYTE [SEDIMENTATION RATE] IN BLOOD BY WESTERGREN METHOD: 19 MM/HR (ref 0–20)
GFR SERPL CREATININE-BSD FRML MDRD: >60 ML/MIN/1.73M2
GLUCOSE BLD-MCNC: 142 MG/DL (ref 70–125)
HCT VFR BLD AUTO: 36.6 % (ref 35–47)
HGB BLD-MCNC: 11.6 G/DL (ref 12–16)
IMM GRANULOCYTES # BLD: 0 THOU/UL
IMM GRANULOCYTES NFR BLD: 0 %
LYMPHOCYTES # BLD AUTO: 2 THOU/UL (ref 0.8–4.4)
LYMPHOCYTES NFR BLD AUTO: 29 % (ref 20–40)
MCH RBC QN AUTO: 27.8 PG (ref 27–34)
MCHC RBC AUTO-ENTMCNC: 31.7 G/DL (ref 32–36)
MCV RBC AUTO: 88 FL (ref 80–100)
MONOCYTES # BLD AUTO: 0.6 THOU/UL (ref 0–0.9)
MONOCYTES NFR BLD AUTO: 9 % (ref 2–10)
NEUTROPHILS # BLD AUTO: 4.2 THOU/UL (ref 2–7.7)
NEUTROPHILS NFR BLD AUTO: 62 % (ref 50–70)
PLATELET # BLD AUTO: 220 THOU/UL (ref 140–440)
PMV BLD AUTO: 12 FL (ref 8.5–12.5)
POTASSIUM BLD-SCNC: 4 MMOL/L (ref 3.5–5)
PROT SERPL-MCNC: 6.3 G/DL (ref 6–8)
RBC # BLD AUTO: 4.18 MILL/UL (ref 3.8–5.4)
SODIUM SERPL-SCNC: 139 MMOL/L (ref 136–145)
WBC: 6.9 THOU/UL (ref 4–11)

## 2021-06-18 NOTE — PROGRESS NOTES
ASSESMENT AND PLAN:  Diagnoses and all orders for this visit:    Epigastric pain  -     H. pylori Antigen, Stool(HPSAG); Future  Increase omeprazole to 20 mg twice daily.  Advised to take sucralfate 1 g 4 times daily.    Type 2 diabetes mellitus treated without insulin  -     Glycosylated Hemoglobin A1c  A1c 7.0 today, was 7.4.  Continue metformin 500 mg twice daily.    COPD (chronic obstructive pulmonary disease)  Managed by pulmonology.  Upcoming appointment scheduled on 6/13/2018.  Instructed to keep that appointment.    Appreciate input from pulmonology.    HTN (hypertension)  Controlled.    S/P coronary artery stent placement  History of SVT.  Cardiology appointment on 6/20/2018.  Instructed to keep that appointment.    Cataract  Surgery scheduled on 6/19/2018.  She will come back next week for follow-up and preop exam.    Patient will be enrolled to healthcare home.  Referral placed today.        SUBJECTIVE: Kulwant Amin a 48-year-old is here for ED follow up.  She has history of type 2 diabetes, COPD, coronary heart disease status post stent placement, SVT.  She is on multiple medications.  She has home health nurse helping her with her medications.  she was seen in the ED 2 weeks ago for COPD exacerbation.  Chest x-ray showed no acute findings.  All labs were unremarkable including troponin, CMP, CBC BMP and troponin.    States her breathing is better since the ED visit but still having exertional shortness of breath.  Her main complaint today is burning sensation on the entire chest both right and left, radiates to right upper back.  She has some mild nausea but no vomiting.  Denied left-sided chest pain.  She uses omeprazole 20 mg daily and sucralfate 1 g 4 times daily.  States the symptoms it is worse when the weather is warmer.    She is checking her blood sugar daily before breakfast.  The numbers ranges in the 120s to 180s.  She had too high in the 200s and 300s 2 weeks ago.  Denied low blood sugar  "with hypoglycemic symptoms.  Her last A1c was 7.4.  No reported side effects from metformin.  She is currently taking metformin 500 mg twice daily.    Past Medical History:   Diagnosis Date     Anxiety      Arthritis      Asthma      Back pain      COPD (chronic obstructive pulmonary disease)      Depression      Diabetes mellitus      Generalized headaches      HTN (hypertension)      Pneumonia      SVT (supraventricular tachycardia)      Patient Active Problem List   Diagnosis     HTN (hypertension)     COPD (chronic obstructive pulmonary disease)     Pain     GERD (gastroesophageal reflux disease)     Prediabetes     SVT (supraventricular tachycardia)     Asthma without status asthmaticus     CAD (coronary artery disease)     Acute blood loss anemia     Abdominal pain     Acute midline low back pain without sciatica     Type 2 diabetes mellitus treated without insulin     S/P coronary artery stent placement     Moderate persistent asthma     Migraine headache       Allergies:  No Known Allergies    History   Smoking Status     Former Smoker     Quit date: 2003   Smokeless Tobacco     Never Used       Review of systems otherwise negative except as listed in HPI.   History   Smoking Status     Former Smoker     Quit date: 2003   Smokeless Tobacco     Never Used       OBJECTICE: /72 (Patient Site: Left Arm, Patient Position: Sitting, Cuff Size: Adult Regular)  Pulse 97  Temp 97.8  F (36.6  C) (Oral)   Ht 5' 3\" (1.6 m)  Wt 126 lb 2 oz (57.2 kg)  SpO2 100%  BMI 22.34 kg/m2    DATA REVIEWED:  Additional History from Old Records Summarized (2):   Radiology Tests Summarized or Ordered (1): CXR on 5/23/18 No acute changes.   Labs Reviewed or Ordered (1): Normal BNP      GEN-alert,  in no apparent distress.  HEENT-mucous membranes are moist, neck is supple.  CV-regular rate and rhythm with no murmur.   RESP-mild wheezing right lungs, no crackles or rhonchi.  ABDOMEN- Soft , not tender.  EXTREM- No " edema.  SKIN-normal    This transcription uses voice recognition software, which may contain typographical errors.        Adrien Cardoza   6/7/2018

## 2021-06-18 NOTE — PROGRESS NOTES
My Clinic Care Coordination Care Plan    [facility]  [address] [place]  [address]  [phone]      My Preferred Method of Contact:  Phone: [phone]    My Primary/Preferred Language:  Amharic    Preferred Learning Style:  Face to face discussion, Pictures/Diagrams and Hands on teaching    Emergency Contact: Extended Emergency Contact Information  Primary Emergency Contact: Rosita Masterson   [facility]  Home Phone: [phone]  Relation: Child  Secondary Emergency Contact: Gloria Masterson   [facility]  Home Phone: [phone]  Relation: Son-In-Law     PCP:  Adrien Cardoza MD  Specialists:    Care Team            Adrien Cardoza MD PCP - General, Family Medicine    [phone]         [facility] Care Coordinator- Crissy Reza     [phone]     [facility] Care Coordination Care Guide, Clinic Care Coordination    PH: [phone] Fax: [phone]     [facility] Care Patient Care Assistant    [phone]     Alt#: [phone]          Hospitalizations and/or ED Visits  Most Recent: 5/23/18     Previous:  3/30/18  Reason:  Shortness of Breath (5/23/18) and Cough (3/30/18)    Concerns regarding my health is my asthma.    Advanced Directive/Living Will:  I will meet with the [facility] on Tuesday 7/3/18 at 11am to discuss a HealthCare Directive/Living will.      Kulwant elected to enroll in the [facility] Care Coordination.  Kulwant was given a copy of the Clinic Care Coordination brochure and full description of how to access their care team both during clinic hours and after hours.   My Care Guide's Name and Phone Number:  Britney Morsehn [phone]  The Care Guide and myself agreed to be in contact monthly.      Medication Update  The patient's medication list is up to date per patient    Health Maintenance and Immunization Update  The patient's preventive health screenings and immunizations are up to date per patient.    My Emergency Plan  Asthma- Adult     You  have been diagnosed with asthma. With the help of your healthcare provider, you can keep your asthma under control and have less emergency department visits and hospitalizations.     Managing asthma    Take your asthma medications exactly as your provider tells you.    Learn how to monitor your asthma. Some people watch for early changes of worsening symptoms and some use a peak flow meter.    Be sure to always have a quick-relief inhaler with you. If you were given a prescription, make sure you go to the drug store or pharmacy to get it filled as soon as possible.  Controlling asthma triggers  Triggers are those things that make your asthma symptoms worse or cause asthma attacks.     Follow-up care  Make a follow-up appointment as directed by our staff.  When to seek medical attention  Call 911 right away if you have:    Severe wheezing    Shortness of breath that is not relieved by your quick-relief medication    Trouble walking or talking because of shortness of breath    Blue lips or fingernails    If you monitor symptoms with a peak flow meter, readings less than 50% of your personal best      Diabetes: Sick-Day Plan  Infections, the flu, and even a cold, can cause your blood sugar to rise. And, eating less, nausea, and vomiting may cause your blood glucose to fall (hypoglycemia). Ask your health care provider to help you develop a sick-day plan. The following information can help.     Call your health care provider if:    You vomit or have diarrhea for more than 6 hours.    Your blood glucose level is higher than usual or over 250 mg/dL after you have taken extra insulin (if recommended in your sick-day plan).    You take oral medicine for diabetes, and your blood sugar is higher than usual or over 250 mg/dL, before a meal and stays that high for more than 24 hours.    Your blood glucose is lower than usual or less than 70 mg/dL    You have moderate to large amounts of ketones in your blood or urine.    You  aren t better after 2 days.      All [facility] clinic patients have access to a Nurse 24 hours a day, 7 days a week.  If you have questions or want advice from a Nurse, please know [facility] is here for you.  You can call your clinic and they will connect you or you can call [facility] at [phone].  [facility] also has Walk In Care clinics in multiple locations.  Call the number listed above for more information about our Walk In Care clinics or visit the [facility] website at [email or web address].    Patient Support  I will ask Karissa Amin and other family members for help in supporting me in these goals  Relationship to patient: Brooketer-in-law  Cell # : [phone] , best time to call anytime

## 2021-06-18 NOTE — PROGRESS NOTES
Interp: [id number]      Care Guide called and discussed the patient's CCC Care Plan with the patient and her daughter. CG educated the patient about a HealthCare Directive and assisted her with scheduling an appointment with the [facility].    [facility] Appointment scheduled for Tuesday 7/3 at 11am      Next Outreach: 7/18/18

## 2021-06-18 NOTE — PROGRESS NOTES
Interp: [id number]      Care Guide was notified by [facility] that there was a scheduling issue for the appointment set up with this patient and the appointment needs to be rescheduled.    Care Guide spoke with the daughter-in-law of the patient and assisted with re-scheduling the [facility] appt to 2pm on Tuesday 7/3.      Next Outreach: 7/18/18

## 2021-06-18 NOTE — PROGRESS NOTES
CG discussed enrolling in [facility] services with the patient. The patient reports she is interested in enrolling.    [facility] RN Assessment is scheduled for Friday 6/15 at 9am.      Next Outreach: 6/21/18

## 2021-06-18 NOTE — PROGRESS NOTES
Preoperative Exam    Scheduled Procedure: First Left eye Cataract, Right eye July 10, 2018   Surgery Date:  June 19, 2018  Surgery Location: [facility] FAX: [phone]    Surgeon:      Assessment/Plan:     1. Preoperative examination    2. Cataract    3. Type 2 diabetes mellitus treated without insulin (H)  Last A1c 7.0.  No changes today.    4. Moderate persistent asthma  Saw Pulm yesterday. Stable.      Have you had prior anesthesia?: Yes  Have you or any family members had a previous anesthesia reaction:  No  Do you or any family members have a history of a clotting or bleeding disorder?: No  Cardiac Risk Assessment: no increased risk for major cardiac complications    Patient approved for surgery with general or local anesthesia.        Functional Status: Independent  Patient plans to recover at home with family.     Subjective:      Kulwant Amin is a 48 y.o. female who presents for a preoperative consultation.    Cataract procedure scheduled on 6/19/10.  Bilateral cataract, procedure on left ye on 6/19/18 and right eye on 7/10/19.    All other systems reviewed and are negative, other than those listed in the HPI.    Pertinent History  Do you have difficulty breathing or chest pain after walking up a flight of stairs: Yes: patient lives in fri floor.   History of obstructive sleep apnea: No  Steroid use in the last 6 months: Yes  Frequent Aspirin/NSAID use: Yes on daily AS and plavix  Prior Blood Transfusion: Yes: [facility] in January 2018  Prior Blood Transfusion Reaction: No  If for some reason prior to, during or after the procedure, if it is medically indicated, would you be willing to have a blood transfusion?:  There is no transfusion refusal.    Current Outpatient Prescriptions   Medication Sig Dispense Refill     albuterol (PROAIR HFA;PROVENTIL HFA;VENTOLIN HFA) 90 mcg/actuation inhaler Inhale 2 puffs every 6 (six) hours as needed for wheezing. 1 Inhaler 2     albuterol (PROVENTIL)  2.5 mg /3 mL (0.083 %) nebulizer solution Take 3 mL (2.5 mg total) by nebulization every 6 (six) hours as needed for wheezing. 75 mL 12     amitriptyline (ELAVIL) 10 MG tablet TAKE 1 TABLET (10 MG TOTAL) BY MOUTH AT BEDTIME. 30 tablet 5     aspirin 81 MG EC tablet Take 1 tablet (81 mg total) by mouth daily. 90 tablet 1     atorvastatin (LIPITOR) 80 MG tablet Take 1 tablet (80 mg total) by mouth at bedtime. 90 tablet 3     blood glucose test strips Use 1 each As Directed 3 (three) times a day. Dispense brand per patient's insurance at pharmacy discretion.(E11.9) 300 strip 3     clopidogrel (PLAVIX) 75 mg tablet Take 1 tablet (75 mg total) by mouth daily. 90 tablet 1     generic lancets Use 1 each As Directed 3 (three) times a day. Dispense brand per patient's insurance at pharmacy discretion.(E11.9) 300 each 3     hydroCHLOROthiazide (MICROZIDE) 12.5 mg capsule TAKE 1 CAPSULE (12.5 MG TOTAL) BY MOUTH DAILY. 90 capsule 1     metFORMIN (GLUCOPHAGE) 500 MG tablet Take 1 tablet (500 mg total) by mouth 2 (two) times a day with meals. 60 tablet 5     metoprolol tartrate (LOPRESSOR) 25 MG tablet Take 1 tablet (25 mg total) by mouth 2 (two) times a day. 180 tablet 1     mometasone-formoterol (DULERA) 200-5 mcg/actuation HFAA inhaler Inhale 2 puffs 2 (two) times a day.       montelukast (SINGULAIR) 10 mg tablet TAKE 1 PILL (10 MG TOTAL) BY MOUTH AT BEDTIME FOR ASTHMA 30 tablet 5     omeprazole (PRILOSEC) 20 MG capsule Take 1 capsule (20 mg total) by mouth daily. 90 capsule 1     sucralfate (CARAFATE) 1 gram tablet TAKE 1 PILL (1 G TOTAL) BY MOUTH 4 (FOUR) TIMES A DAY. 120 tablet 11     umeclidinium-vilanterol (ANORO ELLIPTA) 62.5-25 mcg/actuation inhaler Inhale 1 puff daily. 60 each 2     acetaminophen (TYLENOL) 500 MG tablet Take 500 mg by mouth every 6 (six) hours as needed for pain.       benzonatate (TESSALON) 100 MG capsule Take 1 capsule (100 mg total) by mouth every 8 (eight) hours. 21 capsule 0     blood glucose meter  (GLUCOMETER) Use 1 each As Directed 3 (three) times a day. Dispense glucometer brand per patient's insurance at pharmacy discretion.(E11.9) 1 each 0     guaiFENesin (ROBITUSSIN) 100 mg/5 mL syrup Take 10 mL (200 mg total) by mouth 3 (three) times a day as needed for cough. 200 mL 2     polyethylene glycol (MIRALAX) 17 gram packet Take 1 packet (17 g total) by mouth daily as needed.  0     predniSONE (DELTASONE) 20 MG tablet Take 1 tablet (20 mg total) by mouth daily for 7 days. 7 tablet 2     senna-docusate (PERICOLACE) 8.6-50 mg tablet Take 1 tablet by mouth 2 (two) times a day.  0     traMADol (ULTRAM) 50 mg tablet Take 1 tablet (50 mg total) by mouth every 8 (eight) hours as needed for pain. 30 tablet 0     No current facility-administered medications for this visit.         No Known Allergies    Patient Active Problem List   Diagnosis     HTN (hypertension)     Simple chronic bronchitis (H)     Pain     GERD (gastroesophageal reflux disease)     Prediabetes     SVT (supraventricular tachycardia) (H)     Asthma without status asthmaticus     CAD (coronary artery disease)     Acute blood loss anemia     Abdominal pain     Acute midline low back pain without sciatica     Type 2 diabetes mellitus treated without insulin (H)     S/P coronary artery stent placement     Moderate persistent asthma     Migraine headache     Cataract       Past Medical History:   Diagnosis Date     Anxiety      Arthritis      Asthma      Back pain      COPD (chronic obstructive pulmonary disease) (H)      Depression      Diabetes mellitus (H)      Generalized headaches      HTN (hypertension)      Pneumonia      SVT (supraventricular tachycardia) (H)        Past Surgical History:   Procedure Laterality Date     CV CORONARY ANGIOGRAM N/A 1/25/2018    Procedure: Coronary Angiogram;  Surgeon: Angelo Serrano MD;  Location: [facility];  Service:        Social History     Social History     Marital status:      Spouse  "name: N/A     Number of children: N/A     Years of education: N/A     Occupational History     Not on file.     Social History Main Topics     Smoking status: Former Smoker     Quit date: 2003     Smokeless tobacco: Never Used     Alcohol use No     Drug use: No     Sexual activity: Not on file     Other Topics Concern     Not on file     Social History Narrative    12/22/2017 The patient lives with her daughter-in-law (who is present), , son, and 2 grandchildren (total of 6 people).        Objective:     Vitals:    06/14/18 1124   BP: 116/72   Pulse: 84   Resp: 20   Temp: 98.1  F (36.7  C)   TempSrc: Oral   Weight: 127 lb 2 oz (57.7 kg)   Height: 5' 0.43\" (1.535 m)         Physical Exam:  Physical Exam  Gen - alert, orientated, NAD  Eyes - fundascopic exam limited by the undialated pupil but looks symmetric  ENT - oropharynx clear, TMs clear  Neck - supple, no palpable mass or lymphadenopathy  CV - RRR, no murmur  Resp - lungs CTA  Ab - soft, nontender, no palpable mass or organomegaly  Extrem - warm, no edema  Neuro - CN II-XII intact, strength, sensation, reflexes intact and symmetric  Skin - no rash.  No atypical appearing lesions seen.     There are no Patient Instructions on file for this visit.      Labs:  No labs were ordered during this visit    Immunization History   Administered Date(s) Administered     Hep A, historic 07/11/2012, 05/07/2013     Hep B, historic 04/17/2012, 07/11/2012, 04/03/2013, 05/07/2013     Influenza, inj, historic,unspecified 10/23/2013     Influenza, seasonal,quad inj 36+ mos 10/19/2016     Influenza,seasonal quad, PF, 36+MOS 11/20/2015, 10/04/2017     MMR 11/25/2011, 04/17/2012     Pneumo Conj 13-V (2010&after) 04/28/2016     Td,adult,historic,unspecified 11/25/2011, 05/07/2013     Tdap 04/17/2012     Varicella 04/17/2012, 07/11/2012           Electronically signed by Adrien Cardoza MD 06/14/18 11:30 AM  "

## 2021-06-18 NOTE — PROGRESS NOTES
"Assessment/Plan:        Diagnoses and all orders for this visit:    Simple chronic bronchitis (H)  -     predniSONE (DELTASONE) 20 MG tablet; Take 1 tablet (20 mg total) by mouth daily for 7 days.  Dispense: 7 tablet; Refill: 2    Dyspnea on exertion    Chronic cough  -     guaiFENesin (ROBITUSSIN) 100 mg/5 mL syrup; Take 10 mL (200 mg total) by mouth 3 (three) times a day as needed for cough.  Dispense: 200 mL; Refill: 2    48-year-old woman with history of organic fuel exposure in the home.  Her PFTs are not diagnostic due to poor effort.  She is at risk for chronic bronchitis and so we are treating her for this.  A full assessment of her pulmonary physiology is difficult because of poor quality PFTs.  I am concerned that mood issues and limited physical activity of the cause of a significant amount of her symptoms.  Most likely she does have \"organic\" disease as well in women with her history at high risk for airway issues due to exposures as a child.    It is clear that she will not benefit from addition of other inhalers.  In an effort to limit her ER visits have given her an action plan and discussed in detail with her family with a professional .    In addition we discussed a graded exercise program.  I congratulated her on her normal lung CT scan and the fact that her heart blood vessels have been fixed.  She does report being motivated and wants to feel better.  She will start an exercise program involving daily walks of increasing length in her home and outside as tolerated.        Subjective:    Patient ID: Kulwant Amin is a 48 y.o. female.    HPI Comments: As of breath.  Worse than her last visit 6 months ago.  It is worse with exertion.  It improves with rest oftentimes.  Her albuterol nebulizer sometimes improves her breathing as well.  Symptoms localized to his chest.  Pertinent positives include general weakness.  Pertinent negatives include no sputum production, no hemoptysis, no fever.  " No weight loss.    Asthma: Multiple ER visits but no hospitalizations since her last visit.  She is taking Spiriva daily, Dulera 2 puffs twice daily.  Albuterol 3-4 times daily as needed.  Her triggers include cold weather.  No fumes.    She has essentially no activity at home.  She stays seated all the time.  Her inactivity is due to shortness of breath and due to joint pain.  Is not do any cares of her home.  She is dependent on her family for help.    48-year-old female here for follow-up.  Her breathing is a bit worse than 6 months ago.  She had multiple ER visits and evaluations.  This includes negative PE study.  She had a cath with a 75% LAD lesion which was intervened upon.  Since her catheterization she feels she has more heartburn.  Her breathing is worse with exertion.  Improves with rest.  It is also worse with cold weather.  Warm weather and humid air does not bother.  Symptoms localized to the chest.  Pertinent positives include burning sensation in the chest.  Pertinent negatives include no fever or hemoptysis.    She is relatively inactive in the home.  She does report doing her cardiac rehab.  She tolerated this okay.    Review of Systems  Pertinent as noted in HPI.        Objective:    Physical Exam   Constitutional: She is oriented to person, place, and time. No distress.   Appears older than stated age.  Fatigue.   HENT:   Head: Normocephalic and atraumatic.   Nose: Nose normal.   Eyes: Conjunctivae are normal. Pupils are equal, round, and reactive to light. Right eye exhibits no discharge. Left eye exhibits no discharge. No scleral icterus.   Neck: Normal range of motion. No tracheal deviation present. No thyromegaly present.   Cardiovascular: Normal rate, regular rhythm and normal heart sounds.  Exam reveals no gallop.    No murmur heard.  Pulmonary/Chest: Effort normal and breath sounds normal. No stridor. No respiratory distress. She has no wheezes.   Lymphadenopathy:     She has no cervical  adenopathy.   Neurological: She is alert and oriented to person, place, and time.   Skin: Skin is warm and dry. She is not diaphoretic. No erythema.   Psychiatric: Her behavior is normal. Thought content normal.   Depressed affect   Nursing note and vitals reviewed.          Current Outpatient Prescriptions on File Prior to Visit   Medication Sig Dispense Refill     acetaminophen (TYLENOL) 500 MG tablet Take 500 mg by mouth every 6 (six) hours as needed for pain.       albuterol (PROAIR HFA;PROVENTIL HFA;VENTOLIN HFA) 90 mcg/actuation inhaler Inhale 2 puffs every 6 (six) hours as needed for wheezing. 1 Inhaler 2     albuterol (PROVENTIL) 2.5 mg /3 mL (0.083 %) nebulizer solution Take 3 mL (2.5 mg total) by nebulization every 6 (six) hours as needed for wheezing. 75 mL 12     amitriptyline (ELAVIL) 10 MG tablet TAKE 1 TABLET (10 MG TOTAL) BY MOUTH AT BEDTIME. 30 tablet 5     aspirin 81 MG EC tablet Take 1 tablet (81 mg total) by mouth daily. 90 tablet 1     atorvastatin (LIPITOR) 80 MG tablet Take 1 tablet (80 mg total) by mouth at bedtime. 90 tablet 3     benzonatate (TESSALON) 100 MG capsule Take 1 capsule (100 mg total) by mouth every 8 (eight) hours. 21 capsule 0     blood glucose meter (GLUCOMETER) Use 1 each As Directed 3 (three) times a day. Dispense glucometer brand per patient's insurance at pharmacy discretion.(E11.9) 1 each 0     blood glucose test strips Use 1 each As Directed 3 (three) times a day. Dispense brand per patient's insurance at pharmacy discretion.(E11.9) 300 strip 3     clopidogrel (PLAVIX) 75 mg tablet Take 1 tablet (75 mg total) by mouth daily. 90 tablet 1     generic lancets Use 1 each As Directed 3 (three) times a day. Dispense brand per patient's insurance at pharmacy discretion.(E11.9) 300 each 3     guaiFENesin (ROBITUSSIN) 100 mg/5 mL syrup Take 10 mL (200 mg total) by mouth 3 (three) times a day as needed for cough. 200 mL 0     hydroCHLOROthiazide (MICROZIDE) 12.5 mg capsule TAKE 1  CAPSULE (12.5 MG TOTAL) BY MOUTH DAILY. 90 capsule 1     metFORMIN (GLUCOPHAGE) 500 MG tablet Take 1 tablet (500 mg total) by mouth 2 (two) times a day with meals. 60 tablet 5     metoprolol tartrate (LOPRESSOR) 25 MG tablet Take 1 tablet (25 mg total) by mouth 2 (two) times a day. 180 tablet 1     mometasone-formoterol (DULERA) 200-5 mcg/actuation HFAA inhaler Inhale 2 puffs 2 (two) times a day.       montelukast (SINGULAIR) 10 mg tablet TAKE 1 PILL (10 MG TOTAL) BY MOUTH AT BEDTIME FOR ASTHMA 30 tablet 5     omeprazole (PRILOSEC) 20 MG capsule Take 1 capsule (20 mg total) by mouth daily. 90 capsule 1     polyethylene glycol (MIRALAX) 17 gram packet Take 1 packet (17 g total) by mouth daily as needed.  0     predniSONE (DELTASONE) 20 MG tablet Take 2 tablets (40 mg total) by mouth daily. 8 tablet 0     senna-docusate (PERICOLACE) 8.6-50 mg tablet Take 1 tablet by mouth 2 (two) times a day.  0     sucralfate (CARAFATE) 1 gram tablet TAKE 1 PILL (1 G TOTAL) BY MOUTH 4 (FOUR) TIMES A DAY. 120 tablet 11     tiotropium bromide (SPIRIVA RESPIMAT) 2.5 mcg/actuation Mist Inhale 2 puffs daily. 4 g 4     traMADol (ULTRAM) 50 mg tablet Take 1 tablet (50 mg total) by mouth every 8 (eight) hours as needed for pain. 30 tablet 0     umeclidinium-vilanterol (ANORO ELLIPTA) 62.5-25 mcg/actuation inhaler Inhale 1 puff daily. 60 each 2     No current facility-administered medications on file prior to visit.      /70  Pulse 88  Resp 17  Wt 127 lb (57.6 kg)  SpO2 98% Comment: RA  BMI 22.5 kg/m2    Medical History  Active Ambulatory (Non-Hospital) Problems    Diagnosis     Cataract     Moderate persistent asthma     Migraine headache     S/P coronary artery stent placement     Type 2 diabetes mellitus treated without insulin (H)     Abdominal pain     Acute midline low back pain without sciatica     Acute blood loss anemia     CAD (coronary artery disease)     Asthma without status asthmaticus     SVT (supraventricular  tachycardia) (H)     Prediabetes     GERD (gastroesophageal reflux disease)     Pain     COPD (chronic obstructive pulmonary disease) (H)     HTN (hypertension)     Past Medical History:   Diagnosis Date     Anxiety      Arthritis      Asthma      Back pain      COPD (chronic obstructive pulmonary disease) (H)      Depression      Diabetes mellitus (H)      Generalized headaches      HTN (hypertension)      Pneumonia      SVT (supraventricular tachycardia) (H)         Surgical History  She  has a past surgical history that includes Coronary Angiogram (N/A, 1/25/2018).       Social History  Reviewed, she lives at home with her family.   Allergies  No Known Allergies                             Data Review - imaging, labs, and ekgs listed below were reviewed by me.  Chest XRay and chest CT images and EKG tracings interpreted personally.     Past Labs  Office Visit on 06/07/2018   Component Date Value     Hemoglobin A1c 06/07/2018 7.0*     Specimen Description 06/07/2018 Feces      H pylori Antigen 06/07/2018 SEE NOTES      Report Status 06/07/2018 FINAL 06/11/2018    Admission on 05/23/2018, Discharged on 05/23/2018   Component Date Value     VENTRICULAR RATE 05/23/2018 82      ATRIAL RATE 05/23/2018 82      P-R INTERVAL 05/23/2018 146      QRS DURATION 05/23/2018 92      Q-T INTERVAL 05/23/2018 388      QTC CALCULATION (BEZET) 05/23/2018 453      P Axis 05/23/2018 21      R AXIS 05/23/2018 -21      T AXIS 05/23/2018 31      MUSE DIAGNOSIS 05/23/2018                      Value:Normal sinus rhythm  Normal ECG  When compared with ECG of 29-JAN-2018 20:23,  No significant change was found  Confirmed by ROBSON HAMILTON MD LOC: ([id number]) on 5/23/2018 3:25:32 PM       Sodium 05/23/2018 141      Potassium 05/23/2018 3.9      Chloride 05/23/2018 107      CO2 05/23/2018 23      Anion Gap, Calculation 05/23/2018 11      Glucose 05/23/2018 116      BUN 05/23/2018 8      Creatinine 05/23/2018 0.69      GFR MDRD Af Amer  05/23/2018 >60      GFR MDRD Non Af Amer 05/23/2018 >60      Bilirubin, Total 05/23/2018 0.6      Calcium 05/23/2018 9.5      Protein, Total 05/23/2018 6.8      Albumin 05/23/2018 3.3*     Alkaline Phosphatase 05/23/2018 109      AST 05/23/2018 13      ALT 05/23/2018 16      Troponin I 05/23/2018 0.01      BNP 05/23/2018 26      WBC 05/23/2018 8.7      RBC 05/23/2018 4.59      Hemoglobin 05/23/2018 11.5*     Hematocrit 05/23/2018 36.3      MCV 05/23/2018 79*     MCH 05/23/2018 25.1*     MCHC 05/23/2018 31.7*     RDW 05/23/2018 14.7*     Platelets 05/23/2018 204      MPV 05/23/2018 12.2      CT chest images reviewed and interpreted by me: No pulmonary emboli.  Bring him is essentially clear.    Chest x-ray images reviewed and interpreted by me: Lungs essentially clear.  Xr Chest 2 Views    Result Date: 5/23/2018  XR CHEST 2 VIEWS 5/23/2018 7:24 AM INDICATION: Chest pain, sob COMPARISON: 3/30/2018 and 12/22/2017 FINDINGS: No significant change. Heart size upper limits of normal. Mild tortuosity of thoracic aorta. Pulmonary vessels are normal in size. Stable small benign nodule lateral right lung. Lungs otherwise clear.    Cardiac catheterization report reviewed by me: LAD lesion status post stent.

## 2021-06-18 NOTE — PROGRESS NOTES
RN Recommendations and Referrals  Pharm-D consult/follow up: As needed.   CCC RN: As needed. Currently has SNV every Friday in the home. Patient does not know name of Home Care Agency.   Dental exam: CG to assist with schedulign if havenot had within 1 year.   Eye exam: States go to [facility] in June for Cataract surgery.     Action Plan    RN Will  Will not add the patient to CCC tracking list  Be available to the patient as nursing needs arise    Care Guide Will  Within 2 week sform RN assessment date of 6/19/18, Review PCAM and outreach to patient to create or add to action plan.  Patient has Skilled Nursing Visit weekly- please add agency name to care team. PCA Agency: [facility].     Goals  Goals        Patient Stated      I want support to help me better manage my breathing at home.  (pt-stated)            Action steps to achieve this goal  1.  I will talk with care guide at outreach calls.   2.  I will tell my PCA/ family  to close the windows on hot humid and bad air days.   3. I will use my inhalers and nebulizer as directed to help me with breathing daily.   4. I will talk with my home care nurse who comes to see me on Fridays and learn about my medications and taking care of my breathing.     Date goal set:  06/15/18          Clinic Care Coordination RN Assessed Needs  Patient Centered Assessment Method-PCAM TOTAL SCORE: 19 (6/19/2018  2:28 PM)  Level 1:  A score of 12-24 indicates that the patient has very little to no initial need for RN or SW intervention.  Standard care guide outreach/support should be appropriate at the discretion of the .  The care guide can reach out to the RN/SW as needed for support or with new concerns.    Diabetic population of intervention patient  A1C: 6/7/18 7.0  Last Diabetic Eye Exam: June 2018  Last Diabetic Foot Exam: Due.     1. What have blood sugars been running? States from 100's- 130's.   2. How often does the patient check blood  sugars? Every morning- fasting.   3. Has the patient felt symptomatic with low readings?  Denies   4. What does the patient do to feel better or raise blood sugars? Drink small glass of juice or milk.     PCAM (Patient Centered Assessment Method)   HEALTH AND WELL-BEING  Physical Health Concerns: History of Heart Attack, Diabetes  Other Physical Health Concerns:: Asthma.Recent ER visit for bronchitis. DX with DM abotu 4 years ago ( 2014?).   RN Assessment: Physical Health Needs: No identified areas of uncertainty or problems already being investigated  RN Assessment: Physical Health Problems: No identified areas of concern  Mental Health Concerns: Denies concerns that require further investigation  Other Mental Health Concerns:: . Denies marital concerns.   RN Assessment:Other Mental Well-Being Concern: No identified areas of concern  Lifestyle/Habit Concerns: Exercise/Activity  Other Lifestyle/Habit Concerns:: Sedentary. Has PCA 11. 5 hrs daily & Homemaking service. through [employer]. There are three people sharing the job of PCA: Billy ( daughter in law) ,  Mary (son) , Dulce ( daughter).  Also has SNV 1 x a week. Do not know name of agency.   RN Assessment: Lifestyle Behaviors: Some mild concern of potential negative impact on well-being  SOCIAL ENVIRONMENT  Home Environment Concerns: Denies concerns that require further investigation  Other Home Environment Concerns:: Lives with son and his family in a house. total to 6 people. Son helps with paper work including citizenship application.   RN Assessment: Home Environment: Consistently safe, supportive, stable, no identified problems  Daily Activities Concerns: Denies concerns that require further investigation  Other Daily Activities Concerns:: Lives with her PCAs. SNV is every Friday. Discuss air circualtion in the home to help improve breathing ( circulating fan, AC on when humid and hot outside, close window poor air quality days) .   RN  Assessment: Daily Activites: Adequate participation with social networks  Social Network Concerns: Denies concerns that require further investigation  Other Social Network Concerns:: Prefers to stay in the home.   RN Assessment: Social Network: Adequate participation with social networks  Financial Status and Service Concerns: Denies concerns that require further investigation, Disabled  Other Financial Status and Service Concerns:: Son completes SNAP, medical assistance, and SSI applications as needed for patient to avoid disruption of benefits.   RN Assessment: Financial Resources: Financially insecure, some resource challenges  HEALTH LITERACY AND COMMUNICATION  Understanding of Health and Wellbeing Concerns: Reasonable to good understanding but does not feel able to engage with advise at this time  RN Assessment: Health Literacy: Reasonable to good understanding but do not feel able to engage with advice at this time  Engagement Concerns: Some difficulties in communication with or without moderate barriers  Other Engagement Concerns:: Does not attend medical appointments without family member present.   RN Assessment: Engagement: Adequate communication, with or without minor barriers  Barriers to Compliance with Medical Recommendations: Denies concerns that require further investigation  Other Barriers to Compliance with Medical Recommendations Concerns:: Patient have eqitable access to care with the [facility]: Medical transportation, medical assitance, , PCA/ Homemaker, family, SNV,   SERVICE COORDINATION  Other Services: Other care/services not required at this time  Coordination of Services: All required care/services in place and well coordinated  PCAM TOTAL SCORE: 19      Emergency Plan  Asthma- Adult    You have been diagnosed with asthma. With the help of your healthcare provider, you can keep your asthma under control and have less emergency department visits and  hospitalizations.    Managing asthma  Take your asthma medications exactly as your provider tells you.  Learn how to monitor your asthma. Some people watch for early changes of worsening symptoms and some use a peak flow meter.  Be sure to always have a quick-relief inhaler with you. If you were given a prescription, make sure you go to the drug store or pharmacy to get it filled as soon as possible.  Controlling asthma triggers  Triggers are those things that make your asthma symptoms worse or cause asthma attacks.    Follow-up care  Make a follow-up appointment as directed by our staff.  When to seek medical attention  Call 911 right away if you have:    Severe wheezing    Shortness of breath that is not relieved by your quick-relief medication    Trouble walking or talking because of shortness of breath    Blue lips or fingernails    If you monitor symptoms with a peak flow meter, readings less than 50% of your personal best     Diabetes: Sick-Day Plan  Infections, the flu, and even a cold, can cause your blood sugar to rise. And, eating less, nausea, and vomiting may cause your blood glucose to fall (hypoglycemia). Ask your health care provider to help you develop a sick-day plan. The following information can help.    Call your health care provider if:    You vomit or have diarrhea for more than 6 hours.    Your blood glucose level is higher than usual or over 250 mg/dL after you have taken extra insulin (if recommended in your sick-day plan).    You take oral medicine for diabetes, and your blood sugar is higher than usual or over 250 mg/dL, before a meal and stays that high for more than 24 hours.    Your blood glucose is lower than usual or less than 70 mg/dL    You have moderate to large amounts of ketones in your blood or urine.    You aren t better after 2 days.

## 2021-06-19 NOTE — PROGRESS NOTES
Interp: [id number]    Care Guide called and spoke with Billy Amin, the daughter in law of the patient regarding Kulwant's [facility] goals.    Billy reports that Kulwant is doing okay but has been coughing more since ending the liquid cough supressant medication the PCP had ordered. Billy reports she would like a refill of this medication if possible. Billy reports that her mom also needs a refill of Plavix. Billy reports that the inhaler does help with the breathing.    Billy reports that she has a question about a stent that was placed in [facility] earlier this year. Care Guide researched the chart and found that the patient did have a Coronary Angiogram with a stent placed at [facility] on 1/25/18. Care Guide requested to call  [facility] with the patient and did place this call. Mejia Espitia was able to transfer the patient to a live person at the  Heart Clinic who confirmed that they would get the patient and  to a RN at the [facility] Clinic to talk about her questions related to the stent placement.      Care Guide will send Telephone Call request to PCP and [facility] OB[facility]MED Pool for the refill request and follow up.      Next Outreach: 9/14/18

## 2021-06-19 NOTE — PROGRESS NOTES
ASSESMENT AND PLAN:  Diagnoses and all orders for this visit:    Acute asthma exacerbation  -     promethazine-dextromethorphan (PROMETHAZINE-DM) 6.25-15 mg/5 mL syrup; Take 5 mL by mouth 4 (four) times a day as needed for cough.  Dispense: 150 mL; Refill: 0  -     predniSONE (DELTASONE) 10 mg tablet; Take 4 tabs daily for 3 days, then 3 tabs daily for 3 days, then 2 tabs daily for 3 days, then 1 tab daily for 3 days  Dispense: 30 tablet; Refill: 0  -     azithromycin (ZITHROMAX Z-CLIFFORD) 250 MG tablet; Take 2 tablets (500 mg) on  Day 1,  followed by 1 tablet (250 mg) once daily on Days 2 through 5.  Dispense: 6 tablet; Refill: 0    Type 2 diabetes mellitus treated without insulin (H)  A1c was 7.0 last month.  Did not bring her meter are blood sugar log today.  Continue metformin current dose.  Follow-up appointment already scheduled next month.    Coronary artery disease involving native coronary artery of native heart with angina pectoris (H)  -     aspirin (ASPIR-LOW) 81 MG EC tablet; Take 1 tablet (81 mg total) by mouth daily.  Dispense: 90 tablet; Refill: 2  -     metoprolol tartrate (LOPRESSOR) 25 MG tablet; Take 1 tablet (25 mg total) by mouth 2 (two) times a day.  Dispense: 180 tablet; Refill: 1  Above medications were refilled per request.    HTN (hypertension)  Controlled.    GERD (gastroesophageal reflux disease)  -     omeprazole (PRILOSEC) 20 MG capsule; Take 1 capsule (20 mg total) by mouth daily.  Dispense: 90 capsule; Refill: 1  Symptom fail to improve with Zantac.  Omeprazole is working well.  Continue omeprazole.      SUBJECTIVE: Kulwant Amin is a 48-year-old female with history of asthma, hypertension, diabetes, SVT here with cough, worsening shortness of breath and wheezing for 1 week.  She uses all asthma medications as prescribed.  She denied fever or chills.  Cough is productive at times.    She was seen by cardiology on 6/20/2018 for CAD S/P stent placement, SVT and hypertension.  No medication  "changes were made.  Daughter-in-law [facility] cardiology office will call them for follow-up appointment.  No acute chest pain or palpitations.      Past Medical History:   Diagnosis Date     Anxiety      Arthritis      Asthma      Back pain      COPD (chronic obstructive pulmonary disease) (H)      Depression      Diabetes mellitus (H)      Generalized headaches      HTN (hypertension)      Pneumonia      SVT (supraventricular tachycardia) (H)      Patient Active Problem List   Diagnosis     HTN (hypertension)     Simple chronic bronchitis (H)     Pain     GERD (gastroesophageal reflux disease)     Prediabetes     SVT (supraventricular tachycardia) (H)     Asthma without status asthmaticus     CAD (coronary artery disease)     Acute blood loss anemia     Abdominal pain     Acute midline low back pain without sciatica     Type 2 diabetes mellitus treated without insulin (H)     S/P coronary artery stent placement     Moderate persistent asthma     Migraine headache     Cataract       Allergies:  No Known Allergies    History   Smoking Status     Former Smoker     Quit date: 2003   Smokeless Tobacco     Never Used       Review of systems otherwise negative except as listed in HPI.   History   Smoking Status     Former Smoker     Quit date: 2003   Smokeless Tobacco     Never Used       OBJECTICE: /70 (Patient Site: Right Arm, Patient Position: Sitting, Cuff Size: Adult Regular)  Pulse 95  Temp 98.1  F (36.7  C) (Oral)   Ht 5' 0.5\" (1.537 m)  Wt 125 lb 8 oz (56.9 kg)  SpO2 97%  BMI 24.11 kg/m2        GEN-alert,  in no apparent distress.  HEENT-mucous membranes are moist, neck is supple.  CV-regular rate and rhythm with no murmur.   RESP-coarse breath sounds with wheezing throughout lung fields, no crackles or rhonchi  ABDOMEN- Soft , not tender.  EXTREM- No edema.  SKIN-normal    This transcription uses voice recognition software, which may contain typographical errors.        Adrien Cardoza   7/18/2018     "

## 2021-06-19 NOTE — PROGRESS NOTES
ASSESMENT AND PLAN:  Diagnoses and all orders for this visit:    Moderate persistent asthma  Not on acute exacerbation.  Continue current regimen.  Follow-up in 3 months.    Type 2 diabetes mellitus treated without insulin (H)  -     Microalbumin, Random Urine  Last A1c was 7.0, 2 months ago.  Continue metformin 500 mg twice daily.  Normal creatinine in May 2018.  No labs today.    HTN (hypertension)  Controlled.  Continue current medication.    CAD (coronary artery disease)  Seen by cardiology in 6/18, recommended one-year follow-up.  LDL  57 in 6/18.    GERD (gastroesophageal reflux disease)  Stable on current meds.     Screening for colon cancer  -     Ambulatory referral for Colonoscopy    Encounter for screening mammogram for breast cancer  -     Mammo Screening Bilateral; Future; Expected date: 8/9/18        SUBJECTIVE: Kulwant Amin is a 48-year-old female with history of diabetes, asthma, hypertension and coronary artery disease here for follow-up.  She was seen 2 weeks ago with acute asthma exacerbation.  Was given oral steroid.  She states she is doing much better now.  Her breathing is back to normal.  She is using her daily asthma medications.    Daughter-in-law has been checking her blood sugar once daily, fasting.  The numbers are in 120s and 130s with one high 200s more than 2 weeks ago.  No low blood sugar with hypoglycemic symptoms.  She takes metformin 500 mg twice a day.    She is still taking sucralfate and Prilosec for epigastric pain and burning..  We tried to stop Prilosec a few months ago and the symptoms recur.  Symptoms are stable on current regimen.    She saw cardiology on 6/20/2018 for CAD, history of SVT.  No med changes were made.  Recommended one-year follow-up per daughter-in-law.  She denied acute chest pain, dizziness or palpitations.    She is overall doing better.  No new concerns today.        Past Medical History:   Diagnosis Date     Anxiety      Arthritis      Asthma       "Back pain      COPD (chronic obstructive pulmonary disease) (H)      Depression      Diabetes mellitus (H)      Generalized headaches      HTN (hypertension)      Pneumonia      SVT (supraventricular tachycardia) (H)      Patient Active Problem List   Diagnosis     HTN (hypertension)     Simple chronic bronchitis (H)     Pain     GERD (gastroesophageal reflux disease)     Prediabetes     SVT (supraventricular tachycardia) (H)     Asthma without status asthmaticus     CAD (coronary artery disease)     Acute blood loss anemia     Abdominal pain     Acute midline low back pain without sciatica     Type 2 diabetes mellitus treated without insulin (H)     S/P coronary artery stent placement     Moderate persistent asthma     Migraine headache     Cataract       Allergies:  No Known Allergies    History   Smoking Status     Former Smoker     Quit date: 2003   Smokeless Tobacco     Never Used       Review of systems otherwise negative except as listed in HPI.   History   Smoking Status     Former Smoker     Quit date: 2003   Smokeless Tobacco     Never Used       OBJECTICE: /76 (Patient Site: Left Arm, Patient Position: Sitting, Cuff Size: Adult Regular)  Pulse 88  Temp 98.2  F (36.8  C) (Oral)   Ht 5' 0.5\" (1.537 m)  Wt 126 lb 7 oz (57.4 kg)  BMI 24.29 kg/m2    DATA REVIEWED:  Additional History from Old Records Summarized (2):  Labs Reviewed or Ordered (1):       GEN-alert,  in no apparent distress.  HEENT-mucous membranes are moist, neck is supple.  CV-regular rate and rhythm with no murmur.   RESP-lungs clear to auscultation .  ABDOMEN- Soft , not tender.  EXTREM- No open lesions or ulcers. Sensation intact.   SKIN-normal    This transcription uses voice recognition software, which may contain typographical errors.        Adrien Cardoza   8/9/2018     "

## 2021-06-19 NOTE — PROGRESS NOTES
Interp: [id number]    Care Guide called and spoke with the daughter in law Billy and informed her that the CG attempted to reach Sunday a English interpretor about assisting with scheduling the patient for a [facility] SW appointment but Carynnickjuan manuel did not reach back out to the Care Guide. Billy reports that she has not heard from Sunday and would like the CG to assist her with scheduling a [facility] SW appointment.    [facility] SW Appointment scheduled for Tuesday 7/24 at 11am.    Care Guide explored concerns regarding Kulwant's health and Billy states that she has been having issues with coughing more and breathing issues. Care Guide reviewed the action step for Kulwant's goal about keeping the windows closed during the summer and Billy states she does understand. Billy states she would Kulwant to be seen by Dr. Cardoza tomorrow if possible about the coughing and breathing issues. CG assisted with scheduling an appointment with the PCP.    PCP appointment tomorrow Wednesday 7/18 at 1:40pm with Dr. Cardoza      Next Outreach:

## 2021-06-19 NOTE — LETTER
Letter by Sana Morel RN at      Author: Sana Morel RN Service: -- Author Type: --    Filed:  Encounter Date: 5/31/2019 Status: (Other)       Mina Argueta Advance Care Planning  JEANNINE [facility]  [address] [place] [address]        Kulwant Amin  [facility] [facility] [address]    Dear Kulwant,     We have reviewed the Health Care Directive dated 7/24/2018 which you presented for addition to your medical record. Unfortunately, our review indicates the document is not legally valid for   the following reason: The notary named themselves in error.    In order to create a legal document you will need to have your signature re-notarized or witnessed by 2 people. Notaries and witnesses cannot be named as your health care agents per state law. In addition, only one of your witnesses can be employed by our health care system.    We greatly value the opportunity to assist you in documenting your choices and to honor your   wishes. We apologize for any inconvenience. We have several options to assist you in updating   your document so it meets legal requirements.       Health Care Directives and Advance Care Planning resources can be viewed and printed  for free at our web site:[email or web address].     Free group classes on Advance Care Planning and completing a Health Care Directive are  available at multiple locations and times. These classes are led by trained staff who will   provide information and guide you through a Health Care Directive. They can also review, notarize and add your Health Care Directive to your medical record. Robertson for a class at [email or web address] or by calling [facility] at [phone] or toll free [phone].    COPIES of completed Health Care Directives can be brought or mailed to any of our   locations, including the address listed below. You can also email a copy to [email or web address] .     For  additional assistance or questions you can email us at [email or web address] or call [phone]    Sincerely,     Mina Argueta Advance Care Planning  [facility], [facility] [place] [id number]   Email us: [email or web address] Call us: [phone]  Visit at: [email or web address]

## 2021-06-19 NOTE — PROGRESS NOTES
SW met with pt, daughter and  Rosette.  SW educated pt on purpose and importance of completing healthcare directive.  SW assisted in completing form, form signed by clinic min.  Pt aware copy to be made for chart and to keep original safe at home.  Pt aware she can complete new document at any time if she wishes to change representatives.  No further SW needs identified today.

## 2021-06-20 NOTE — PROGRESS NOTES
ASSESMENT AND PLAN:  Diagnoses and all orders for this visit:    Hospital discharge follow-up  Admitted due to epigastric pain and chest wall pain.  No significant improvement after hospital discharge.    Abdominal pain, epigastric  -     famotidine (PEPCID) 40 mg/5 mL (8 mg/mL) oral suspension; Take 5 mL (40 mg total) by mouth 2 (two) times a day.  Dispense: 200 mL; Refill: 1  -     Ambulatory referral to Gastroenterology  States liquid medication helped better in the past.  Will try Pepcid.  Refer to GI for evaluation and treatment.    Moderate persistent asthma  No on acute exacerbation.  Continue current regimen.    Type 2 diabetes mellitus treated without insulin (H)  A1c 6.4 one months ago.  Continue current medications.    S/P coronary artery stent placement  Patient was seen by cardiology during recent hospital admission.  No acute concerns.      SUBJECTIVE: Kulwant Amin is a 48-year-old female with history of asthma, diabetes, coronary artery disease status post stent placement, depression and gastritis here for hospital follow-up.  She was admitted on 10/4/2018 due to epigastric and chest wall pain, she was discharged on 10/5/2018.  Chest x-ray was unremarkable.  Serial troponins were negative.  Cardiology was consulted.  It was concluded that the pain is most likely due to GI problem.  She was on omeprazole 20 mg daily, was increased to 20 mg twice daily upon discharge.  She is also taking sucralfate 1 g 4 times daily.  She states the symptom is slightly better after hospital discharge but still having significant burning on epigastric radiates to the back.  She denied nausea or vomiting.  She denied diarrhea.  She takes Colace for constipation.  Denied blood in the stool.  No acute fever or URI symptoms.    Past Medical History:   Diagnosis Date     Anxiety      Arthritis      Asthma      Back pain      COPD (chronic obstructive pulmonary disease) (H)      Depression      Diabetes mellitus (H)       "Generalized headaches      HTN (hypertension)      Pneumonia      SVT (supraventricular tachycardia) (H)      Patient Active Problem List   Diagnosis     HTN (hypertension)     Simple chronic bronchitis (H)     Pain     GERD (gastroesophageal reflux disease)     SVT (supraventricular tachycardia) (H)     Chest wall pain     Asthma without status asthmaticus     CAD (coronary artery disease)     Acute blood loss anemia     Abdominal pain     Acute midline low back pain without sciatica     Type 2 diabetes mellitus treated without insulin (H)     S/P coronary artery stent placement     Moderate persistent asthma     Migraine headache     Cataract     Epigastric pain     Unstable angina (H)       Allergies:  No Known Allergies    History   Smoking Status     Former Smoker     Quit date: 2003   Smokeless Tobacco     Never Used       Review of systems otherwise negative except as listed in HPI.   History   Smoking Status     Former Smoker     Quit date: 2003   Smokeless Tobacco     Never Used       OBJECTICE: /76 (Patient Site: Left Arm, Patient Position: Sitting, Cuff Size: Adult Regular)  Pulse 94  Temp 98  F (36.7  C) (Oral)   Resp 16  Ht 5' 3\" (1.6 m)  Wt 125 lb 9 oz (57 kg)  BMI 22.24 kg/m2    DATA REVIEWED:  Additional History from Old Records Summarized (2):   Radiology Tests Summarized or Ordered (1):   Labs Reviewed or Ordered (1):       GEN-alert,  in no apparent distress.  HEENT-mucous membranes are moist, neck is supple.  CV-regular rate and rhythm with no murmur.   RESP-no wheezing today.  CHEST WALL-tenderness on right side just chest wall.  ABDOMEN- Soft , significant epigastric tenderness, no rebound or guarding.  No palpable masses.  EXTREM- No edema.  SKIN-normal    This transcription uses voice recognition software, which may contain typographical errors.        Adrien Cardoza   10/11/2018     "

## 2021-06-20 NOTE — PROGRESS NOTES
ASSESMENT AND PLAN:  Diagnoses and all orders for this visit:    Acute asthma exacerbation  -     azithromycin (ZITHROMAX Z-CLIFFORD) 250 MG tablet; Take 2 tablets (500 mg) on  Day 1,  followed by 1 tablet (250 mg) once daily on Days 2 through 5.  Dispense: 6 tablet; Refill: 0  -     predniSONE (DELTASONE) 10 mg tablet; Take 1 tablet (10 mg total) by mouth 2 (two) times a day for 5 days.  Dispense: 10 tablet; Refill: 0  Follow-up in 2 weeks.    Type 2 diabetes mellitus treated without insulin (H)  Advised to watch diet closely her while on prednisone.  Report if blood sugar above 200s with oral prednisone.  Follow-up as scheduled next month.     Hypertension  Controlled, continue current management.    CAD (coronary artery disease)  History of SVT.  Managed by cardiology.  Follow-up will be in January 2019 per daughter-in-law.      SUBJECTIVE: Kulwant Amin is a 48-year-old female with history of asthma here with worsening productive cough and shortness of breath for the past 5 days.  She denies fever but complaining of chills.  She takes all her asthma medications as prescribed.  She received 5 days course of prednisone 10 mg daily a few weeks ago.  She brought in her blood sugar log.  No significant increase in blood sugar reading while she was on low-dose prednisone.  Her asthma symptoms are usually worse in the cold months.  He denied acute chest pain no palpitations today.  She has history of coronary artery disease, S/P stent placement.  Last seen by cardiology in 6/18.     Past Medical History:   Diagnosis Date     Anxiety      Arthritis      Asthma      Back pain      COPD (chronic obstructive pulmonary disease) (H)      Depression      Diabetes mellitus (H)      Generalized headaches      HTN (hypertension)      Pneumonia      SVT (supraventricular tachycardia) (H)      Patient Active Problem List   Diagnosis     HTN (hypertension)     Simple chronic bronchitis (H)     Pain     GERD (gastroesophageal reflux  "disease)     SVT (supraventricular tachycardia) (H)     Asthma without status asthmaticus     CAD (coronary artery disease)     Acute blood loss anemia     Abdominal pain     Acute midline low back pain without sciatica     Type 2 diabetes mellitus treated without insulin (H)     S/P coronary artery stent placement     Moderate persistent asthma     Migraine headache     Cataract       Allergies:  No Known Allergies    History   Smoking Status     Former Smoker     Quit date: 2003   Smokeless Tobacco     Never Used       Review of systems otherwise negative except as listed in HPI.   History   Smoking Status     Former Smoker     Quit date: 2003   Smokeless Tobacco     Never Used       OBJECTICE: /70 (Patient Site: Left Arm, Patient Position: Sitting, Cuff Size: Adult Regular)  Pulse 84  Temp 97.8  F (36.6  C) (Oral)   Resp 20  Ht 5' 0.63\" (1.54 m)  Wt 125 lb 8 oz (56.9 kg)  SpO2 99%  BMI 24 kg/m2        GEN-alert,  in no apparent distress.  HEENT-mucous membranes are moist, neck is supple.  CV-regular rate and rhythm with no murmur.   RESP-coarse breath sounds with wheezing both lung fields, no crackles or rhonchi.  ABDOMEN- Soft , not tender.  EXTREM- No edema.  SKIN-normal    This transcription uses voice recognition software, which may contain typographical errors.          Adrien Cardoza   9/20/2018     "

## 2021-06-20 NOTE — LETTER
Letter by Jose Rafael Meadows MD at      Author: Jose Rafael Meadows MD Service: -- Author Type: --    Filed:  Encounter Date: 1/28/2020 Status: (Other)        [facility] PATIENT ACCESS  [address] [facility] [address]  [facility] [address]  [phone]         Kulwant Amin  [address] [facility] [address]        01/28/20    Dear Kulwant Amin,     At [facility] we care about your health and well-being. Your primary care provider is committed to ensuring you receive high quality care and has chosen a network of specialists to assist in providing that care. Recently Dr. Meadows referred you to Mammography for specialty care.      Please call [facility] at [phone] at your earliest convenience for assistance in scheduling an appointment.  If you have already scheduled this appointment, please disregard this notice.  Thank you for choosing [facility] for your healthcare needs.       Sincerely,       [facility] Specialty Scheduling

## 2021-06-20 NOTE — LETTER
Letter by Adrien Cardoza MD at      Author: Adrien Cardoza MD Service: -- Author Type: --    Filed:  Encounter Date: 12/31/2019 Status: Signed       My Asthma Action Plan     Name: Kulwant Amin   YOB: 1970  Date: 12/31/2019   My doctor: Adrien Cardoza MD   My clinic: [facility]         My Rescue Medicine:   Albuterol (Proair/Ventolin/Proventil HFA) 2-4 puffs EVERY 4 HOURS as needed. Use a spacer if recommended by your provider.   My Asthma Severity:   Moderate Persistent  Know your asthma triggers: cold air             GREEN ZONE   Good Control    I feel good    No cough or wheeze    Can work, sleep and play without asthma symptoms     Take your asthma control medicine every day.     1. If exercise triggers your asthma, take your rescue medication    15 minutes before exercise or sports, and    During exercise if you have asthma symptoms  2. Spacer to use with inhaler: If you have a spacer, make sure to use it with your inhaler             YELLOW ZONE Getting Worse  I have ANY of these:    I do not feel good    Cough or wheeze    Chest feels tight    Wake up at night 1. Keep taking your Green Zone medications  2. Start taking your rescue medicine:    every 20 minutes for up to 1 hour. Then every 4 hours for 24-48 hours.  3. If you stay in the Yellow Zone for more than 12-24 hours, contact your doctor.  4. If you do not return to the Green Zone in 12-24 hours or you get worse, start taking your oral steroid medicine if prescribed by your provider.           RED ZONE Medical Alert - Get Help  I have ANY of these:    I feel awful    Medicine is not helping    Breathing getting harder    Trouble walking or talking    Nose opens wide to breathe     1. Take your rescue medicine NOW  2. If your provider has prescribed an oral steroid medicine, start taking it NOW  3. Call your doctor NOW  4. If you are still in the Red Zone after 20 minutes and you have not reached your doctor:    Take your rescue  medicine again and    Call 911 or go to the emergency room right away    See your regular doctor within 2 weeks of an Emergency Room or Urgent Care visit for follow-up treatment.          Annual Reminders:  Meet with Asthma Educator,  Flu Shot in the Fall, consider Pneumonia Vaccination for patients with asthma (aged 19 and older).    Pharmacy:   [facility] - [place], [place] - [address]  [address]  [address]  [place] [address]  Phone: [phone] Fax: [phone]      Electronically signed by Adrien Cardoza MD   Date: 12/31/19                      Asthma Triggers  How To Control Things That Make Your Asthma Worse    Triggers are things that make your asthma worse.  Look at the list below to help you find your triggers and what you can do about them.  You can help prevent asthma flare-ups by staying away from your triggers.      Trigger                                                          What you can do   Cigarette Smoke  Tobacco smoke can make asthma worse. Do not allow smoking in your home, car or around you.  Be sure no one smokes at a lenard day care or school.  If you smoke, ask your health care provider for ways to help you quit.  Ask family members to quit too.  Ask your health care provider for a referral to Quit Plan to help you quit smoking, or call 7-596-383-PLAN.     Colds, Flu, Bronchitis  These are common triggers of asthma. Wash your hands often.  Dont touch your eyes, nose or mouth.  Get a flu shot every year.     Dust Mites  These are tiny bugs that live in cloth or carpet. They are too small to see. Wash sheets and blankets in hot water every week.   Encase pillows and mattress in dust mite proof covers.  Avoid having carpet if you can. If you have carpet, vacuum weekly.   Use a dust mask and HEPA vacuum.   Pollen and Outdoor Mold  Some people are allergic to trees, grass, or weed pollen, or molds. Try to keep your windows closed.  Limit time out doors when pollen  count is high.   Ask you health care provider about taking medicine during allergy season.     Animal Dander  Some people are allergic to skin flakes, urine or saliva from pets with fur or feathers. Keep pets with fur or feathers out of your home.    If you cant keep the pet outdoors, then keep the pet out of your bedroom.  Keep the bedroom door closed.  Keep pets off cloth furniture and away from stuffed toys.     Mice, Rats, and Cockroaches  Some people are allergic to the waste from these pests.   Cover food and garbage.  Clean up spills and food crumbs.  Store grease in the refrigerator.   Keep food out of the bedroom.   Indoor Mold  This can be a trigger if your home has high moisture. Fix leaking faucets, pipes, or other sources of water.   Clean moldy surfaces.  Dehumidify basement if it is damp and smelly.   Smoke, Strong Odors, and Sprays  These can reduce air quality. Stay away from strong odors and sprays, such as perfume, powder, hair spray, paints, smoke incense, paint, cleaning products, candles and new carpet.   Exercise or Sports  Some people with asthma have this trigger. Be active!  Ask your doctor about taking medicine before sports or exercise to prevent symptoms.    Warm up for 5-10 minutes before and after sports or exercise.     Other Triggers of Asthma  Cold air:  Cover your nose and mouth with a scarf.  Sometimes laughing or crying can be a trigger.  Some medicines and food can trigger asthma.

## 2021-06-20 NOTE — PROGRESS NOTES
Interp: [id number]    Care Guide spoke with the patients daughter in law Billy about [facility] goals and any new concerns.    Billy reports that Kulwant's breathing is about the same, no changes and states she is using the nebulizer and inhaler. She reports that she continues to bring Kulwant to the doctor about her breathing. No new concerns today about breathing.    Billy states that Kulwant is doing well following her 9/11 right eye cataract surgery and states that she understands she can contact [facility] with any questions or concerns about the surgery. Billy reports she has the phone number.    Billy states that she understands that she can open the windows as the weather starts to cool down to allow better air flow into the home which was a part of her [facility] goal.      Next Outreach: 10/15/18

## 2021-06-20 NOTE — PROGRESS NOTES
Preoperative Exam    Scheduled Procedure: Right Eye Cataract   Surgery Date:  September 11, 2018  Surgery Location: [facility], [place], fax [phone]    Surgeon:      Assessment/Plan:     1. Pre-op exam    2. Cataract  Right eye    3. Type 2 diabetes mellitus treated without insulin (H)  A1c 6.4.   Follow-up appointment scheduled next month.  - Glycosylated Hemoglobin A1c    4. Moderate persistent asthma without complication  - albuterol (PROVENTIL) 2.5 mg /3 mL (0.083 %) nebulizer solution; Take 3 mL (2.5 mg total) by nebulization every 6 (six) hours as needed for wheezing.  Dispense: 75 mL; Refill: 12  Added oral prednisone 10 mg to take 1 tablet daily for 5 days.  Discussed side effects including elevated glucose, advised to watch diet closer while on this medication.    5. Need for influenza vaccination  - Influenza, Seasonal Quad, Preservative Free 36+ Months    Have you had prior anesthesia?: Yes  Have you or any family members had a previous anesthesia reaction:  No  Do you or any family members have a history of a clotting or bleeding disorder?: No  Cardiac Risk Assessment: no increased risk for major cardiac complications    Patient approved for surgery with general or local anesthesia.    Functional Status: Independent  Patient plans to recover at home with family.     Subjective:      Kulwant Amin is a 48 y.o. female who presents for a preoperative consultation.   48-year-old female with history of diabetes, asthma, coronary artery disease S/P stent placement here for preop exam.  She is having cataract surgery done in the right side on 9/11/2018.  She had cataract surgery done in the left eye a few months ago with no complications.  States she is seeing better after the first procedure.    She takes all her asthma medications as prescribed.  She has been having more wheezing and cough for the past few days.  Cough is nonproductive.  She denied fever or worsening shortness of  breath.    All other systems reviewed and are negative, other than those listed in the HPI.    Pertinent History  Do you have difficulty breathing or chest pain after walking up a flight of stairs: No  History of obstructive sleep apnea: No  Steroid use in the last 6 months: Yes: started today  Frequent Aspirin/NSAID use: Yes, Aspirin daily  Prior Blood Transfusion: Yes: [facility] in January 2018  Prior Blood Transfusion Reaction: No  If for some reason prior to, during or after the procedure, if it is medically indicated, would you be willing to have a blood transfusion?:  There is no transfusion refusal.    Current Outpatient Prescriptions   Medication Sig Dispense Refill     albuterol (PROAIR HFA;PROVENTIL HFA;VENTOLIN HFA) 90 mcg/actuation inhaler Inhale 2 puffs every 6 (six) hours as needed for wheezing. 1 Inhaler 2     albuterol (PROVENTIL) 2.5 mg /3 mL (0.083 %) nebulizer solution Take 3 mL (2.5 mg total) by nebulization every 6 (six) hours as needed for wheezing. 75 mL 12     amitriptyline (ELAVIL) 10 MG tablet TAKE 1 TABLET (10 MG TOTAL) BY MOUTH AT BEDTIME. 30 tablet 5     aspirin (ASPIR-LOW) 81 MG EC tablet Take 1 tablet (81 mg total) by mouth daily. 90 tablet 2     atorvastatin (LIPITOR) 80 MG tablet Take 1 tablet (80 mg total) by mouth at bedtime. 90 tablet 3     blood glucose meter (GLUCOMETER) Use 1 each As Directed 3 (three) times a day. Dispense glucometer brand per patient's insurance at pharmacy discretion.(E11.9) 1 each 0     blood glucose test strips Use 1 each As Directed 3 (three) times a day. Dispense brand per patient's insurance at pharmacy discretion.(E11.9) 300 strip 3     clopidogrel (PLAVIX) 75 mg tablet Take 1 tablet (75 mg total) by mouth daily. 90 tablet 0     hydroCHLOROthiazide (MICROZIDE) 12.5 mg capsule TAKE 1 CAPSULE (12.5 MG TOTAL) BY MOUTH DAILY. 90 capsule 1     MAPAP EXTRA STRENGTH 500 mg tablet TAKE 1 PILL BY MOUTH EVERY 6 HOURS AS NEEDED FOR PAIN 90 tablet 3     metFORMIN  (GLUCOPHAGE) 500 MG tablet Take 1 tablet (500 mg total) by mouth 2 (two) times a day with meals. 60 tablet 5     metoprolol tartrate (LOPRESSOR) 25 MG tablet Take 1 tablet (25 mg total) by mouth 2 (two) times a day. 180 tablet 1     mometasone-formoterol (DULERA) 200-5 mcg/actuation HFAA inhaler Inhale 2 puffs 2 (two) times a day.       montelukast (SINGULAIR) 10 mg tablet TAKE 1 PILL (10 MG TOTAL) BY MOUTH AT BEDTIME FOR ASTHMA 30 tablet 5     omeprazole (PRILOSEC) 20 MG capsule Take 1 capsule (20 mg total) by mouth daily. 90 capsule 1     sucralfate (CARAFATE) 1 gram tablet TAKE 1 PILL (1 G TOTAL) BY MOUTH 4 (FOUR) TIMES A DAY. 120 tablet 11     umeclidinium-vilanterol (ANORO ELLIPTA) 62.5-25 mcg/actuation inhaler Inhale 1 puff daily. 60 puff 2     benzonatate (TESSALON) 100 MG capsule Take 1 capsule (100 mg total) by mouth every 8 (eight) hours. 21 capsule 0     generic lancets Use 1 each As Directed 3 (three) times a day. Dispense brand per patient's insurance at pharmacy discretion.(E11.9) 300 each 3     guaiFENesin (ROBITUSSIN) 100 mg/5 mL syrup Take 10 mL (200 mg total) by mouth 3 (three) times a day as needed for cough. 200 mL 2     polyethylene glycol (MIRALAX) 17 gram packet Take 1 packet (17 g total) by mouth daily as needed.  0     promethazine-dextromethorphan (PROMETHAZINE-DM) 6.25-15 mg/5 mL syrup Take 5 mL by mouth 4 (four) times a day as needed for cough. 150 mL 0     senna-docusate (PERICOLACE) 8.6-50 mg tablet Take 1 tablet by mouth 2 (two) times a day.  0     traMADol (ULTRAM) 50 mg tablet Take 1 tablet (50 mg total) by mouth every 8 (eight) hours as needed for pain. 30 tablet 0     No current facility-administered medications for this visit.         No Known Allergies    Patient Active Problem List   Diagnosis     HTN (hypertension)     Simple chronic bronchitis (H)     Pain     GERD (gastroesophageal reflux disease)     Prediabetes     SVT (supraventricular tachycardia) (H)     Asthma without  "status asthmaticus     CAD (coronary artery disease)     Acute blood loss anemia     Abdominal pain     Acute midline low back pain without sciatica     Type 2 diabetes mellitus treated without insulin (H)     S/P coronary artery stent placement     Moderate persistent asthma     Migraine headache     Cataract       Past Medical History:   Diagnosis Date     Anxiety      Arthritis      Asthma      Back pain      COPD (chronic obstructive pulmonary disease) (H)      Depression      Diabetes mellitus (H)      Generalized headaches      HTN (hypertension)      Pneumonia      SVT (supraventricular tachycardia) (H)        Past Surgical History:   Procedure Laterality Date     CV CORONARY ANGIOGRAM N/A 1/25/2018    Procedure: Coronary Angiogram;  Surgeon: Angelo Serrano MD;  Location: [facility];  Service:        Social History     Social History     Marital status:      Spouse name: N/A     Number of children: N/A     Years of education: N/A     Occupational History     Not on file.     Social History Main Topics     Smoking status: Former Smoker     Quit date: 2003     Smokeless tobacco: Never Used     Alcohol use No     Drug use: No     Sexual activity: Not on file     Other Topics Concern     Not on file     Social History Narrative    12/22/2017 The patient lives with her daughter-in-law (who is present), , son, and 2 grandchildren (total of 6 people).          Objective:     Vitals:    09/07/18 1424   BP: 96/68   Pulse: 80   Resp: 16   Temp: 98.4  F (36.9  C)   TempSrc: Oral   Weight: 125 lb 2 oz (56.8 kg)   Height: 5' 0.63\" (1.54 m)         Physical Exam:  Physical Exam    Gen - alert, orientated, NAD  Eyes - fundascopic exam limited by the undialated pupil but looks symmetric  ENT - oropharynx clear, TMs clear  Neck - supple, no palpable mass or lymphadenopathy  CV - RRR, no murmur  Breast - no dominant mass on either side, no axillary mass.  Resp - lungs mild wheezing, no crackles " or rhonchi.  Ab - soft, nontender, no palpable mass or organomegaly  Extrem - warm, no edema  Neuro - CN II-XII intact, strength, sensation, reflexes intact and symmetric  Skin - no rash.  No atypical appearing lesions seen.       Labs:  Recent Results (from the past 24 hour(s))   Glycosylated Hemoglobin A1c    Collection Time: 09/07/18  2:57 PM   Result Value Ref Range    Hemoglobin A1c 6.4 (H) 3.5 - 6.0 %       Immunization History   Administered Date(s) Administered     Hep A, historic 07/11/2012, 05/07/2013     Hep B, historic 04/17/2012, 07/11/2012, 04/03/2013, 05/07/2013     Influenza, inj, historic,unspecified 10/23/2013     Influenza, seasonal,quad inj 36+ mos 10/19/2016     Influenza,seasonal quad, PF, 36+MOS 11/20/2015, 10/04/2017     MMR 11/25/2011, 04/17/2012     Pneumo Conj 13-V (2010&after) 04/28/2016     Td,adult,historic,unspecified 11/25/2011, 05/07/2013     Tdap 04/17/2012     Varicella 04/17/2012, 07/11/2012           Electronically signed by Adrien Cardoza MD 09/07/18 2:30 PM

## 2021-06-20 NOTE — PROGRESS NOTES
"TCM DISCHARGE FOLLOW UP CALL    Discharge Date:  10/5/2018  Reason for hospital stay (discharge diagnosis)::  Exacerbation of GERD and costochondritis  Are you feeling better, the same or worse since your discharge?:  Patient is feeling better (\"Getting better, but slowly; still burning in chest.\")  Do you feel like you have a plan in the event of a health emergency?: Yes (Call clinic)    As part of your discharge plan, were  home care services ordered for you?: No    Did you receive any new medications, or was there a change to your medications?: Yes    Are you taking those medications, or do you have any established regiment?:  Yes - omeprazole 20 mg two times a day.  Taking as prescribed.  Do you have any follow up visits scheduled with your PCP or Specialist?:  Yes, with PCP (RN rescheduled appt w/Dr. Cardoza to 10/11/18, per pt's request)  (RN) Is PCP appt scheduled soon enough (within 14 days of discharge date)?: Yes    RN NOTES::  RN advised pt avoid tomatoes & citrus foods, as noted in d/c paperwork.  Pt states she hasn't eaten much salt & eating more \"plain foods.\"      Lesia Robertson RN Care Manager, Population Health          "

## 2021-06-21 NOTE — PROGRESS NOTES
"Interp: [id number]    Care Guide called and spoke with Kulwant Cervantes's daughter in law.    Billy reports that Kulwant is \"doing okay\". She reports that she is still having some breathing issues and she reports that Kulwant is using her prednisone medication as needed.    Billy reports that Kulwant did attend a recent GI appointment on 10/24/18 and she states that they are recommending a colonoscopy be completed in January 2019. Billy reports that she has no concerns about scheduling the colonoscopy.    Care Guide discussed calling patient back in 3 months to determine if there are any new needs or concerns. If patient is managing her GI concerns well and there are no new concerns patient can be placed on Maintenance at that time.      Next Outreach: 2/7/19    - Did patient complete a colonoscopy in January 2019?  - Does patient have any new health CCC goals?  "

## 2021-06-21 NOTE — PROGRESS NOTES
MTM Initial Encounter  Assessment & Plan                                                       GERD: Improved. Following up with Dr. Cardoza 11/09.    Diabetes: Stable. HgA1c is at goal <7% and fasting blood sugar between  mg/dL.     CAD/Hyperlipidemia: Needs improvement. Due to patient having difficulty swallowing atorvastatin, recommended patient cut atorvastatin in half, provided pill cutter for her to use. LDL goal <70, patient is taking high intensity statin, atorvastatin 80 mg has similar LDL lowering as high intensity rosuvastatin 40 mg daily so patient would benefit from continuing current therapy.     HTN/Hx SVT: Stable.     Asthma/COPD: Due to historical records from Pulmonology and PCP indicating diagnosis of COPD and asthma, triple therapy (LABA, ICS and LAMA) may be beneficial for patient. Duplicate therapy is present with Anoro and Dulera (both containing LABAs). Currently insurance does not cover Spiriva as LAMA, but does cover Incruse Ellipta. Patient may benefit from stopping Anoro Ellipta and starting Incruse Ellipta 1 puff daily. Recommended she stop Anoro Ellipta. Due to patient preferring to talk to Dr. Cardoza regarding potential changes to inhalers, will discuss potential future steps with Dr. Cardoza. Discussed appropriate inhaler technique.     Future considerations: ACT, did not have time at this encounter.     Pain: Unimproved. Patient may have run out of amitriptyline, looks like it was refilled yesterday, to reassess at next follow up.     Follow Up  1 month with Dr. Cardoza, patient would benefit from following up with MTM 1 month after inhaler changes to assess inhaler technique.     Subjective & Objective                                                     Kulwant Amin is a 48 y.o. female coming in for an initial visit for Medication Therapy Management. She was referred to me from Adrien Cardoza MD.  present. Daughter present.     Chief Complaint: Following up from ED visit on 10/04/18 for  GERD and costochondritis.     Medication Adherence/Access: Home care nurse comes to home and fills pill box every 2 weeks. Patient brought medication in today, but states that these are not all medications, that there are 2 or more at home.     GERD: Current medications include: famotidine 40 mg by mouth daily, sucralfate 1 gram four times a day, omeprazole 20 mg two times a day. Patient states that since adding on famotidine by Dr. Cardoza last encounter has helped with symptoms, but still experiencing acid/burning feeling. Finds that foods trigger symptoms, such as tomatoes so stopped eating.     Diabetes: Current medications include metformin 500 mg two times a day with meals. No medication sie effects. No symptoms of hypoglycemia or hyperglycemia present. Blood sugars have improved since monitoring foods higher in carbohydrates.  Testing blood sugars once daily in AM fasting from log book: 121, 113, 117, 115, 123, 118, 118, 142, 120  Currently taking aspirin 81 mg daily, no issues of bleeding or bruising.   Lab Results   Component Value Date    HGBA1C 6.4 (H) 09/07/2018    HGBA1C 7.0 (H) 06/07/2018    HGBA1C 7.4 (H) 04/06/2018     Lab Results   Component Value Date    MICROALBUR <0.50 08/09/2018    LDLCALC 121 01/25/2018    CREATININE 0.58 (L) 10/05/2018     CAD/Hyperlipidemia: Current medications include: atorvastatin 80 mg by mouth daily, clopidogrel 75 mg daily, aspirin 81 mg daily. S/p coronary artery stent January 2018. No medication side effects. No issues of bleeding or bruising. No muscle aches/pains. No chest pain. Has lowered the amount of fats (oils and butter) she uses when cooking. Patient states it is difficult to swallow atrovastatin due to size.     HTN/Hx SVT: Currently taking hydrochlorothiazide 12.5 mg daily and metoprolol 25 mg two times a day. Does not monitor blood pressure at home. No medication side effects. Patient reports no dizziness/lightheadednes.    Asthma/COPD: Current medications  include Dulera 2 puffs two times a day, Anoro Ellipta inhaler 1 puff daily, montelukast 10 mg nightly, Albuterol HFA and neb PRN shortness of breath/wheezing using at 3-4 times per week. No nighttime awakenings. Rinses mouth after using Dulera. Patient thought she was supposed to be using Anoro and Dulera inhaler. Historical record indicated trying to reach patient to discuss this, patient was unaware of this call. Followed by Pulmonology.   Results from last Pulmonology visit 6/13/2018: ACT 11, CAT 37 - at this encounter was diagnosed with bronchitis.     Pain: Current medications include: acetaminophen 500 mg PRN, amitriptyline 10 mg nightly at bedtime, patient is unsure if she is taking amitriptyline for pain. Amitriptyline was not present today in patient's medications.     PMH: reviewed in EPIC   Allergies/ADRs: reviewed in EPIC   Alcohol: reviewed in EPIC   Tobacco:   History   Smoking Status     Former Smoker     Quit date: 2003   Smokeless Tobacco     Never Used     Today's Vitals:   Vitals:    10/17/18 0949   BP: 117/72   Pulse: 62   Weight: 124 lb 8 oz (56.5 kg)     ----------------    Much or all of the text in this note was generated through the use of Dragon Dictate voice-to-text software. Errors in spelling or words which seem out of context are unintentional. Sound alike errors, in particular, may have escaped editing.    The patient declined an after visit summary    I spent 45 minutes with this patient today.   I offer these suggestions for consideration by the PCP. A copy of the visit note was provided to the patient's provider.     Sana Grullon, PharmD   Medication Therapy Management Pharmacist   [facility]      Current Outpatient Prescriptions   Medication Sig Dispense Refill     albuterol (PROAIR HFA;PROVENTIL HFA;VENTOLIN HFA) 90 mcg/actuation inhaler Inhale 2 puffs every 6 (six) hours as needed for wheezing. 1 Inhaler 2     albuterol (PROVENTIL) 2.5 mg /3 mL (0.083 %)  nebulizer solution Take 3 mL (2.5 mg total) by nebulization every 6 (six) hours as needed for wheezing. 75 mL 12     amitriptyline (ELAVIL) 10 MG tablet TAKE 1 TABLET (10 MG TOTAL) BY MOUTH AT BEDTIME. 30 tablet 5     aspirin (ASPIR-LOW) 81 MG EC tablet Take 1 tablet (81 mg total) by mouth daily. 90 tablet 2     atorvastatin (LIPITOR) 80 MG tablet Take 1 tablet (80 mg total) by mouth at bedtime. 90 tablet 3     benzonatate (TESSALON) 100 MG capsule Take 100 mg by mouth 3 (three) times a day as needed for cough.       blood glucose meter (GLUCOMETER) Use 1 each As Directed 3 (three) times a day. Dispense glucometer brand per patient's insurance at pharmacy discretion.(E11.9) 1 each 0     blood glucose test strips Use 1 each As Directed 3 (three) times a day. Dispense brand per patient's insurance at pharmacy discretion.(E11.9) 300 strip 3     clopidogrel (PLAVIX) 75 mg tablet Take 1 tablet (75 mg total) by mouth daily. 90 tablet 0     famotidine (PEPCID) 40 mg/5 mL (8 mg/mL) oral suspension Take 5 mL (40 mg total) by mouth 2 (two) times a day. 200 mL 1     generic lancets Use 1 each As Directed 3 (three) times a day. Dispense brand per patient's insurance at pharmacy discretion.(E11.9) 300 each 3     guaiFENesin (ROBITUSSIN) 100 mg/5 mL syrup Take 10 mL (200 mg total) by mouth 3 (three) times a day as needed for cough. 200 mL 2     hydroCHLOROthiazide (MICROZIDE) 12.5 mg capsule Take 1 capsule (12.5 mg total) by mouth daily. 90 capsule 2     MAPAP EXTRA STRENGTH 500 mg tablet TAKE 1 PILL BY MOUTH EVERY 6 HOURS AS NEEDED FOR PAIN 90 tablet 3     metFORMIN (GLUCOPHAGE) 500 MG tablet TAKE 1 TABLET (500 MG TOTAL) BY MOUTH 2 (TWO) TIMES A DAY WITH MEALS FOR DIABETES 60 tablet 2     metoprolol tartrate (LOPRESSOR) 25 MG tablet Take 1 tablet (25 mg total) by mouth 2 (two) times a day. 180 tablet 1     mometasone-formoterol (DULERA) 200-5 mcg/actuation HFAA inhaler Inhale 2 puffs 2 (two) times a day.       montelukast  (SINGULAIR) 10 mg tablet TAKE 1 PILL (10 MG TOTAL) BY MOUTH AT BEDTIME FOR ASTHMA 30 tablet 5     omeprazole (PRILOSEC) 20 MG capsule Take 1 capsule (20 mg total) by mouth 2 (two) times a day before meals. 90 capsule 1     polyethylene glycol (MIRALAX) 17 gram packet Take 1 packet (17 g total) by mouth daily as needed.  0     prednisoLONE acetate (PRED-FORTE) 1 % ophthalmic suspension Administer 1 drop to the right eye 4 (four) times a day. Continue until instructed to stop(post eye procedure)       sucralfate (CARAFATE) 1 gram tablet TAKE 1 PILL (1 G TOTAL) BY MOUTH 4 (FOUR) TIMES A DAY. 120 tablet 11     umeclidinium-vilanterol (ANORO ELLIPTA) 62.5-25 mcg/actuation inhaler Inhale 1 puff daily. 60 puff 2     No current facility-administered medications for this visit.

## 2021-06-21 NOTE — PROGRESS NOTES
Interp: [id number]    Care Guide spoke with the daughter in law of the patient Billy.    Billy reports that Kulwant's breathing is being well managed by the family. (Goal completed)    Billy reports that she does feel Kulwant needs to focus on her GI concerns and reports that she is still waiting on a call from the GI  to schedule Kulwant an appointment. Billy reports that Kulwant is avoiding tomatoes and citrus foods like the doctor recommended. Care Guide did create new goal around Kulwant's need to manage her GI concerns.    Next Outreach: 11/14/18

## 2021-06-21 NOTE — PROGRESS NOTES
ASSESMENT AND PLAN:  Diagnoses and all orders for this visit:    Moderate persistent asthma  Continue current regimen.  No refill needed today.  Printed prescription for prednisone given to use in case of worsening wheezing and cough in the next few months prior to next appointment.  Discussed side effects including elevated glucose with steroid.  Follow-up in 2 months.    Type 2 diabetes mellitus treated without insulin (H)  Last A1c 6.4,1-month ago.  Continue metformin 500 mg twice daily.    S/P coronary artery stent placement  Managed by cardiology.  Last seen in 6/18, recommended one-year follow-up.    Epigastric pain   Seen by GI.  Symptom stable now.  Pending upper GI and colonoscopy.  GI recommended clearance by cardiology before scheduling appointment.    Hyperlipidemia  Continue current med.  Lipid panel done last week, LDL 41.          SUBJECTIVE: Kulwant Amin is a 48-year-old female with history of asthma, diabetes and CAD status post stent placement here for follow-up.    Asthma: Currently using Dulera twice a day and albuterol as needed.  She also takes Singulair.  She also has nebulizer machine at home.  She is doing well lately.  But states she tends to get worsening symptoms in the winter months.  She is wondering if she can get prescription for oral prednisone to have it on hand.  She denied acute fever or cough.  Denied acute shortness of breath or wheezing.    Diabetes: Last A1c was 6.4 in September 2018.  She is currently on metformin 500 mg twice a day.  She checks fasting sugar daily.  The numbers range from 102 to the highest 174 in the past 2 weeks.  Denied low blood sugar with hypoglycemic symptoms.    Epigastric pain: She was recently seen by GI on 10/24/2018.  Note reviewed.  Recommended upper endoscopy and colonoscopy, but recommended clearance from cardiology prior to scheduling.  Advised to continue omeprazole and Pepcid.  Discontinued sucralfate.  Symptoms are stable for now.  No  "worsening pain since last visit.    Past Medical History:   Diagnosis Date     Anxiety      Arthritis      Asthma      Back pain      COPD (chronic obstructive pulmonary disease) (H)      Depression      Diabetes mellitus (H)      Generalized headaches      HTN (hypertension)      Pneumonia      SVT (supraventricular tachycardia) (H)      Patient Active Problem List   Diagnosis     HTN (hypertension)     Simple chronic bronchitis (H)     Pain     GERD (gastroesophageal reflux disease)     SVT (supraventricular tachycardia) (H)     Chest wall pain     Asthma without status asthmaticus     CAD (coronary artery disease)     Acute blood loss anemia     Abdominal pain     Acute midline low back pain without sciatica     Type 2 diabetes mellitus treated without insulin (H)     S/P coronary artery stent placement     Moderate persistent asthma     Migraine headache     Cataract     Epigastric pain     Unstable angina (H)       Allergies:  No Known Allergies    History   Smoking Status     Former Smoker     Quit date: 2003   Smokeless Tobacco     Never Used       Review of systems otherwise negative except as listed in HPI.   History   Smoking Status     Former Smoker     Quit date: 2003   Smokeless Tobacco     Never Used       OBJECTICE: /62  Pulse 94  Temp 97.9  F (36.6  C) (Oral)   Resp 20  Ht 5' 3\" (1.6 m)  Wt 129 lb 1 oz (58.5 kg)  SpO2 98%  Breastfeeding? No  BMI 22.86 kg/m2    DATA REVIEWED:  Additional History from Old Records Summarized (2):   Labs Reviewed or Ordered (1):       GEN-alert,  in no apparent distress.  HEENT-mucous membranes are moist, neck is supple.  CV-regular rate and rhythm with no murmur.   RESP-no wheezing today.  ABDOMEN- Soft , mild epigastric tenderness.  No rebound or guarding.  No palpable masses.  EXTREM- No edema.  SKIN-normal    This transcription uses voice recognition software, which may contain typographical errors.        Adrien Cardoza   11/7/2018     "

## 2021-06-21 NOTE — PROGRESS NOTES
TCM DISCHARGE FOLLOW UP CALL    Discharge Date:  11/26/2018  Reason for hospital stay (discharge diagnosis)::  Shortness of breath  Are you feeling better, the same or worse since your discharge?:  Patient is feeling better (Still feeling short of breath.)  Do you feel like you have a plan in the event of a health emergency?: Yes (Call 911 or go to ER.  RN informed daughter-in-law of 24/7 nurse triage.)    As part of your discharge plan, were  home care services ordered for you?: No    Did you receive any new medications, or was there a change to your medications?: Yes    Are you taking those medications, or do you have any established regiment?:  RN reviewed new/changed d/c medications w/pt from d/c paperwork.  Daughter-in-law states medications are set up by home care RN once a week; nurse was out yesterday to set up meds & answered questions.  Do you have any follow up visits scheduled with your PCP or Specialist?:  Yes, with PCP (Dr. Cardoza 11/28/18)  (RN) Is PCP appt scheduled soon enough (within 14 days of discharge date)?: Yes        Lesia Robertson RN Care Manager, Population Health

## 2021-06-21 NOTE — LETTER
Letter by Elizabeth Marie MD at      Author: Elizabeth Marie MD Service: -- Author Type: --    Filed:  Encounter Date: 3/18/2021 Status: (Other)         March 18, 2021     Tamra Duong MD  [address]  [address]  [facility] [address]    Patient: Kulwant Amin   MR Number: [id number]   YOB: 1970   Date of Visit: 3/18/2021     Dear Dr. Ad MD:    Thank you for referring Kulwant Amin to me for evaluation. The specific IgE panel drawn at your visit was still pending so I am checking on the status.  I think she would be a good candidate for either Fasenra or Nucala.  However, I do need a work-up for parasitic disease prior to starting these medications.  Would you please let me know your thoughts regarding Fasenra or Nucala?  I have included my note for your review.    If you have questions, please do not hesitate to call me.     Sincerely,        Elizabeth Marie MD        CC  No Recipients    Progress Notes:        Subjective   Kulwant Amin is 51 y.o. and presents today for the following health issues   HPI chief complaint: Asthma and allergies    History of present illness: This is a pleasant 51-year-old woman that I was asked to see for evaluation by Dr. Tamra Duong in regards to allergies.  Patient has been closely followed by Dr. Duogn for asthma.  She has a history of moderate severe persistent asthma and has been hospitalized multiple times.  She also has bronchiectasis.  She has been managed with Spiriva as well and on montelukast and Breo.  She was noted to have eosinophilia on her CBC from February.  Eosinophil count was 800.  She has currently pending specific IgE test for environmental allergens.  She has itchy eyes at times but no nasal congestion or drainage but states she has constant shortness of breath.  She states is present throughout the day and it is difficult to sleep at night.  She is wondering if there are any other options to improve her  "breathing.    Past medical history: Reflux, type 2 diabetes, coronary artery disease, hyperlipidemia, latent TB, depression, stroke    Social history: She lives with her children, no pets at home, non-smoker, no exposure to mold    Family history: Negative for allergies and asthma          Review of Systems  Review of Systems performed as above and the remainder is negative.      Objective    Pulse 81   Resp 22   Ht 5' 3\" (1.6 m)   Wt 121 lb (54.9 kg)   SpO2 98%   BMI 21.43 kg/m    Body mass index is 21.43 kg/m .  Physical Exam  Gen: Pleasant female not in acute distress  HEENT: Eyes no erythema of the bulbar or palpebral conjunctiva, no edema. Ears: TMs well visualized, no effusions. Nose: No congestion, mucosa normal. Mouth: Throat clear, no lip or tongue edema.   Cardiac: Regular rate and rhythm, no murmurs, rubs or gallops  Respiratory: End expiratory wheezing  Lymph: No supraclavicular or cervical lymphadenopathy  Skin: No rashes or lesions  Psych: Alert and oriented times 3        Impression report and plan:    1.  Moderate to severe persistent asthma  2.  Eosinophilia    I will await her specific IgE testing.  I would like to work-up her eosinophilia for parasitic disease as I think she would be a good candidate for a biologic such as Fasenra or Nucala maybe even Dupixent.  I went over the risk and benefits of these medications and the patient would like to proceed with possible approval.  She has been in the hospital several times for her breathing.  I will discuss her case with Dr. Duong as well.           "

## 2021-06-22 ENCOUNTER — COMMUNICATION - HEALTHEAST (OUTPATIENT)
Dept: ALLERGY | Facility: CLINIC | Age: 51
End: 2021-06-22

## 2021-06-22 DIAGNOSIS — J45.50 SEVERE PERSISTENT ASTHMA WITHOUT COMPLICATION (H): ICD-10-CM

## 2021-06-22 NOTE — PROGRESS NOTES
ASSESMENT AND PLAN:  Diagnoses and all orders for this visit:    Hospital discharge follow-up  Back to her baseline.    Rectal bleeding  From internal hemorrhoid.  Colonoscopy and upper GI done while in the hospital.  [place]   Improved.    Moderate persistent asthma without complication  -     albuterol (PROVENTIL) 2.5 mg /3 mL (0.083 %) nebulizer solution; Take 3 mL (2.5 mg total) by nebulization every 6 (six) hours as needed for wheezing.  Dispense: 75 mL; Refill: 12    Type 2 diabetes mellitus treated without insulin (H)  Last A1c was 6.8, done 2 weeks ago.  Restarted metformin, lactic acid was back to normal.  Discontinue glipizide, called to pharmacy.    GERD (gastroesophageal reflux disease)  -     omeprazole (PRILOSEC) 20 MG capsule; Take 1 capsule (20 mg total) by mouth 2 (two) times a day before meals.  Dispense: 90 capsule; Refill: 1    Coronary artery disease involving native coronary artery of native heart with angina pectoris (H)  -     metoprolol tartrate (LOPRESSOR) 25 MG tablet; Take 1 tablet (25 mg total) by mouth 2 (two) times a day.  Dispense: 180 tablet; Refill: 1    Anemia, unspecified type  -     Hemoglobin  Active bleeding resolved.  Hemoglobin 10.9 today, was 10.23 days ago.      SUBJECTIVE: Kulwant Amin is a 48-year-old female with multiple medical condition including asthma, COPD, diabetes type 2, CAD status post stent placement here for hospital follow-up.  She was admitted from 12/7/18 to 12/11/18 due to rectal bleeding.  GI was consulted.  Upper GI and colonoscopy were done while in the hospital.  Per GI was normal.  Colonoscopy showed some internal hemorrhoids and polyp with no active bleeding.  It was determined that the bleeding was most likely from internal hemorrhoid.  Aspirin and Plavix were continued due to history of CAD and history of stent placement.    Rectal bleeding has resolved.  She is doing well after hospital discharge.  Hemoglobin was 10.2 on 12/11/2018.  She denied  dizziness.  Denied worsening fatigue.    She denied acute wheezing or cough today.  She usually gets worsening wheezing and cough during the winter months.  She uses all her inhalers and asthma medications as prescribed.    She was in the hospital from 11/22/18 to 11/26/18 prior to this recent hospital admission.  Metformin was discontinued due to elevated lactic acid.  She was started on glipizide.  Metformin was restarted after the most recent admission.  She has home health nurse helping her with her medications.    Past Medical History:   Diagnosis Date     Anxiety      Arthritis      Asthma      Back pain      COPD (chronic obstructive pulmonary disease) (H)      Depression      Diabetes mellitus (H)      Generalized headaches      History of anesthesia complications     N/V     HTN (hypertension)      Pneumonia      SVT (supraventricular tachycardia) (H)      Patient Active Problem List   Diagnosis     HTN (hypertension)     Simple chronic bronchitis (H)     Pain     GERD (gastroesophageal reflux disease)     SVT (supraventricular tachycardia) (H)     Chest wall pain     Asthma without status asthmaticus     CAD (coronary artery disease)     Acute blood loss anemia     Abdominal pain     Acute midline low back pain without sciatica     Type 2 diabetes mellitus treated without insulin (H)     S/P coronary artery stent placement     Moderate persistent asthma     Migraine headache     Cataract     Epigastric pain     Unstable angina (H)     Hyperlipidemia     Shortness of breath     Lactic acid acidosis     Dyspnea     Hematochezia     Abdominal pain, left lower quadrant     Rectal bleed     GI bleeding     Lower GI bleeding     Rectal bleeding       Allergies:  No Known Allergies    Social History     Tobacco Use   Smoking Status Former Smoker     Last attempt to quit: 2003     Years since quitting: 15.9   Smokeless Tobacco Never Used       Review of systems otherwise negative except as listed in HPI.   Social  "History     Tobacco Use   Smoking Status Former Smoker     Last attempt to quit: 2003     Years since quitting: 15.9   Smokeless Tobacco Never Used       OBJECTICE: /72   Pulse 98   Temp 98.2  F (36.8  C) (Oral)   Resp 16   Ht 5' 4\" (1.626 m)   Wt 126 lb 7 oz (57.4 kg)   SpO2 98%   BMI 21.70 kg/m      DATA REVIEWED:  Additional History from Old Records Summarized (2):   Radiology Tests Summarized or Ordered (1): CT abdomen pelvis showed no cause of pain and rectal bleeding is identified.  Labs Reviewed or Ordered (1):       GEN-alert,  in no apparent distress.  HEENT-mucous membranes are moist, neck is supple.  CV-regular rate and rhythm with no murmur.   RESP-no wheezing today.  ABDOMEN- Soft , not tender.  EXTREM- No edema.  SKIN-normal    This transcription uses voice recognition software, which may contain typographical errors.        Adrien Cardoza   12/14/2018   "

## 2021-06-22 NOTE — PROGRESS NOTES
MTM Follow Up Encounter  Assessment & Plan                                                       1. Hematuria, unspecified type  Unimproved. Discussed with Dr. Cardoza, ordered UA, no infection found and no blood in urine. Dr. Cardoza to complete physical exam today.   - Urinalysis-UC if Indicated; Future    2. Rectal bleed  Improved.    3. Type 2 diabetes mellitus treated without insulin (H)  Stable. A1c at goal <7%.     4. Gastroesophageal reflux disease without esophagitis  Stable.       Follow Up  4 months      Subjective & Objective                                                       Kulwant Amin is a 48 y.o. female coming in for a follow up visit for Medication Therapy Management. She was referred to me from Adrien Cardoza MD    Chief Complaint: Hospital YAQUELIN follow up, has been seen previously by MTM in clinic on 12/05/18. Blood in urine.     Medication Adherence/Access: No issues reported.     Hemorrhoids: Currently taking nothing. Patient states that this has improved since hospital visit. Was seen in hospital on 12/11/18 for this. Colonoscopy and upper GI completed and found internal hemorrhoids.     Blood in urine: Patients states that blood in urine is new since follow up with Dr. Cardoza. She states that she does not have urinary frequency but at times pain upon urination. She states that she is having flank pain, which is also new.     GERD: Currently taking omeprazole 20 mg once daily. Finds that since restarting that heartburn is improved. No concerns today.     Diabetes:  Pt currently taking metformin 500 mg two times a day (restarted 12/14/18). Previous hospital visit 12/05 stopped metformin due to lactic acid level in hospital and discharge and started glipizide. Metformin restarted and glipizide stopped last visit with PCP.  patient is not currently experiencing side effects.   SMBG: testing 2-3 times per week. Patient is unsure of previous blood sugars over last week.   Patient is not experiencing hypoglycemia    Recent symptoms of high blood sugar? None  Eye exam: up to date   Foot exam: up to date   ACEi/ARB:  Lisinopril   Lab Results   Component Value Date    HGBA1C 6.8 (H) 11/28/2018    HGBA1C 6.4 (H) 09/07/2018    HGBA1C 7.0 (H) 06/07/2018     Lab Results   Component Value Date    MICROALBUR <0.50 08/09/2018    LDLCALC 41 11/02/2018    CREATININE 0.65 12/10/2018     PMH: reviewed in EPIC   Allergies/ADRs: reviewed in EPIC   Alcohol: none   Tobacco:   Social History     Tobacco Use   Smoking Status Former Smoker     Last attempt to quit: 2003     Years since quitting: 15.9   Smokeless Tobacco Never Used     Today's Vitals: There were no vitals filed for this visit.  ----------------    Much or all of the text in this note was generated through the use of Dragon Dictate voice-to-text software. Errors in spelling or words which seem out of context are unintentional. Sound alike errors, in particular, may have escaped editing.    The patient declined an after visit summary    I spent 30 minutes with this patient today;   .. A copy of the visit note was provided to the patient's provider.     Sana Grullon, PharmD   Medication Therapy Management Pharmacist   [facility]      Current Outpatient Medications   Medication Sig Dispense Refill     albuterol (PROVENTIL) 2.5 mg /3 mL (0.083 %) nebulizer solution Take 3 mL (2.5 mg total) by nebulization every 6 (six) hours as needed for wheezing. 75 mL 12     amitriptyline (ELAVIL) 10 MG tablet TAKE 1 TABLET (10 MG TOTAL) BY MOUTH AT BEDTIME. 30 tablet 10     aspirin (ASPIR-LOW) 81 MG EC tablet Take 1 tablet (81 mg total) by mouth daily. 90 tablet 2     atorvastatin (LIPITOR) 80 MG tablet Take 1 tablet (80 mg total) by mouth at bedtime. 90 tablet 3     blood glucose meter (GLUCOMETER) Use 1 each As Directed 3 (three) times a day. Dispense glucometer brand per patient's insurance at pharmacy discretion.(E11.9) 1 each 0     blood glucose test strips Use 1 each As  Directed 3 (three) times a day. Dispense brand per patient's insurance at pharmacy discretion.(E11.9) 300 strip 3     clopidogrel (PLAVIX) 75 mg tablet Take 1 tablet (75 mg total) by mouth daily. 90 tablet 0     cyanocobalamin 1000 MCG tablet Take 1,000 mcg by mouth every evening.       doxylamine (UNISOM, DOXYLAMINE,) 25 mg tablet Take 1 tablet (25 mg total) by mouth at bedtime as needed for sleep. 30 tablet 1     generic lancets Use 1 each As Directed 3 (three) times a day. Dispense brand per patient's insurance at pharmacy discretion.(E11.9) 300 each 3     hydroCHLOROthiazide (MICROZIDE) 12.5 mg capsule Take 1 capsule (12.5 mg total) by mouth daily. 90 capsule 2     MAPAP EXTRA STRENGTH 500 mg tablet TAKE 1 PILL BY MOUTH EVERY 6 HOURS AS NEEDED FOR PAIN 90 tablet 3     metFORMIN (GLUCOPHAGE) 500 MG tablet TAKE 1 TABLET (500 MG TOTAL) BY MOUTH 2 TIMES A DAY WITH MEALS FOR DIABETES 60 tablet 2     metoprolol tartrate (LOPRESSOR) 25 MG tablet Take 1 tablet (25 mg total) by mouth 2 (two) times a day. 180 tablet 1     mometasone-formoterol (DULERA) 200-5 mcg/actuation HFAA inhaler Inhale 2 puffs 2 (two) times a day.       montelukast (SINGULAIR) 10 mg tablet TAKE 1 PILL (10 MG TOTAL) BY MOUTH AT BEDTIME FOR ASTHMA 30 tablet 10     omeprazole (PRILOSEC) 20 MG capsule Take 1 capsule (20 mg total) by mouth 2 (two) times a day before meals. 90 capsule 1     polyethylene glycol (MIRALAX) 17 gram packet Take 1 packet (17 g total) by mouth daily. 30 packet 0     VENTOLIN HFA 90 mcg/actuation inhaler INHALE 2 PUFFS BY MOUTH EVERY 6 HOURS AS NEEDED FOR WHEEZING 18 g 2     No current facility-administered medications for this visit.

## 2021-06-22 NOTE — PROGRESS NOTES
TCM DISCHARGE FOLLOW UP CALL    Discharge Date:  12/11/2018  Reason for hospital stay (discharge diagnosis)::  Hematochezia  Are you feeling better, the same or worse since your discharge?:  Patient is feeling better (Denies rectal bleeding, N/V. Stools loose.)  Do you feel like you have a plan in the event of a health emergency?: No (She would go to the hospital)    (RN) Patient provided with information to call us in the event of a health emergency: Yes (Instructed pt to call [facility] if she has a minor illness.)    As part of your discharge plan, were  home care services ordered for you?: No    Did you receive any new medications, or was there a change to your medications?: Yes    Are you taking those medications, or do you have any established regiment?:  Taking metformin two times a day. Instructed pt to take a capful of Miralax in a glass of water daily if stool get firm. Pt agreed  Do you have any follow up visits scheduled with your PCP or Specialist?:  Yes, with PCP (12/14)

## 2021-06-23 NOTE — TELEPHONE ENCOUNTER
Fax received from [facility], they have started the Prior Authorization Process via Cover My Meds    CoverMyMeds Key: [id number]    Medication Name: Humalog Kwikpen    Insurance Plan: [employer]  PBM:   Patient ID: not provided on fax    Please complete the PA process

## 2021-06-23 NOTE — PROGRESS NOTES
Emergency for maintenance:    Emergency Plan Recommendation:    When to Use the Emergency Department (ED)  An emergency means you could die if you don t get care quickly. Or you could be hurt permanently (disabled). Read below to know when to use -- and when not to use -- an emergency department (also called ED).    Dangers to your life  Here are examples of emergencies. These need immediate care:  A hard time breathing  Severe chest pain  Choking  Severe bleeding  Suddenly not able to move or speak  Blacking out (fainting)`  Poisoning    Dangers of permanent injuries  Here are other emergencies. These also need immediate care:  Deep cuts or severe burns  Broken bones, or sudden severe pain and swelling in a joint    When it s an emergency  If you have an emergency, follow these steps:    1. Go to the nearest emergency department  If you can, go to the hospital ED closest to you right away.  If you cannot get there right away, or if it is not safe to take yourself, call 911 or your police emergency number.  2. Call your primary care doctor  Tell your doctor about the emergency. Call within 24 hours of going to the ED.  If you cannot call, have someone call for you.  Go to your doctor (not the ED) for any follow-up care.    When it s not an emergency  If a problem is not an emergency, follow these steps:    1. Call your primary care doctor  If you don t know the name of your doctor, call your health plan.  If you cannot call, have someone call for you.  2. Follow instructions  Your doctor will tell you what you should do.  You may be told to see your doctor right away. You may be told to go to the ED. Or you may be told to go to an urgent care center.  Follow your doctor s advice.  High Blood Pressure (Hypertension)  You have been diagnosed with high blood pressure (also called hypertension). This means the force of blood against your artery walls is too strong. It also means your heart is working hard to move blood.  High blood pressure usually has no symptoms, but over time, it can damage your heart, blood vessels, eyes, kidneys, and other organs. With help from your doctor, you can manage your blood pressure and protect your health.  Taking medications    Learn to take your own blood pressure. Keep a record of your results. Ask your doctor which readings mean that you need medical attention.    Take your blood pressure medication exactly as directed. Don t skip doses. Missing doses can cause your blood pressure to get out of control.    Avoid medications that contain heart stimulants, including over-the-counter drugs. Check for warnings about high blood pressure on the label.    Check with your doctor before taking a decongestant. Some decongestants can worsen high blood pressure.  Lifestyle changes    Maintain a healthy weight. Get help to lose any extra pounds.    Cut back on salt.  o Limit canned, dried, packaged, and fast foods.  o Don t add salt to your food at the table.  o Season foods with herbs instead of salt when you cook.    Follow the DASH (Dietary Approaches to Stop Hypertension) eating plan. This plan recommends vegetables, fruits, whole gains, and other heart healthy foods.    Begin an exercise program. Ask your doctor how to get started. The American Heart Association recommends aerobic exercise 3 to 4 times a week for an average of 40 minutes at a time, with your doctor's approval. Simple activities like walking or gardening can help.    Break the smoking habit. Enroll in a stop-smoking program to improve your chances of success. Ask your health care provider about programs and medications to help you stop smoking.    Limit drinks that contain caffeine (coffee, black or green tea, cola) to 2 per day.    Never take stimulants such as amphetamines or cocaine; these drugs can be deadly for someone with high blood pressure.    Control your stress. Learn stress-management techniques.    Limit alcohol to no more  than 1 drink a day for women and 2 drinks a day for men.  Follow-up care  Make a follow-up appointment as directed by our staff.     When to seek medical care  Call your doctor immediately if you have any of the following:    Chest pain or shortness of breath (call 911)    Moderate to severe headache    Weakness in the muscles of your face, arms, or legs    Trouble speaking    Extreme drowsiness    Confusion    Fainting or dizziness    Pulsating or rushing sound in your ears    Unexplained nosebleed    Weakness, tingling, or numbness of your face, arms, or legs    Change in vision    Blood pressure measured at home that is greater than 180/110     Long-Term Complications of Diabetes can cause health problems over time. These are called complications. They are more likely to happen if your blood sugar is often too high. Over time, high blood sugar can damage blood vessels in your body. It is important to keep your blood sugar in your target range. This can help prevent or delay complications from diabetes.  Possible complications  Complications of diabetes include:    Eye problems, including damage to the blood vessels in the eyes (retinopathy), pressure in the eye (glaucoma), and clouding of the eye s lens (a cataract). Eye problems can eventually lead to irreversible blindness.     Tooth and gum problems (periodontal disease), causing loss of teeth and bone    Blood vessel (vascular) disease leading to circulation problems, heart attack or stroke, or a need for amputation of a limb     Problems with sexual function leading to erectile dysfunction in men and sexual discomfort in women     Kidney disease (nephropathy) can eventually lead to kidney failure, which may require dialysis or kidney transplant     Nerve problems (neuropathy), causing pain or loss of feeling in your feet and other parts of your body, potentially leading to an amputation of a limb     High blood pressure (hypertension), putting strain on your  heart and blood vessels    Serious infections, possibly leading to loss of toes, feet, or limbs  How to avoid complications  The serious consequences of these complications may be avoidable for most people with diabetes by managing your blood glucose, blood pressure, and cholesterol levels. This can help you feel better and stay healthy. You can manage diabetes by tracking your blood sugar. You can also eat healthy and exercise to avoid gaining weight. And you should take medicine if directed by your healthcare provider.  Call your health care provider if:    You vomit or have diarrhea for more than 6 hours.    Your blood glucose level is higher than usual or over 250 mg/dL after you have taken extra insulin (if recommended in your sick-day plan).    You take oral medicine for diabetes, and your blood sugar is higher than usual or over 250 mg/dL, before a meal and stays that high for more than 24 hours.    Your blood glucose is lower than usual or less than 70 mg/dL    You have moderate to large amounts of ketones in your blood or urine.    You aren t better after 2 days.

## 2021-06-23 NOTE — PROGRESS NOTES
My Clinic Care Coordination Wellness Plan    This Maintenance Wellness Plan provides private information in regards to the work I have done with my Care Team from my Primary Care Clinic.  This document provides insight on the goals I have worked hard to achieve.  My Care Team congratulates me on my journey to become well.  With the assistance of my Clinic Care Guide and Primary Care Physician,  I have succeeded in improving areas of my health that I identified as barriers to becoming well.  I will continue to seek wellness and use the skills I have obtained to further my journey.  My Care Guide will follow up with me in 3 months.  In the meantime, if I should have any questions or concerns I will contact my Care Guide.     [facility]  [address] [place]  [address]  [phone]      My Preferred Method of Contact:  Phone: [phone]    My Primary/Preferred Language:  Luxembourgish    Preferred Learning Style:  Face to face discussion, Pictures/Diagrams and Hands on teaching    Emergency Contact: Extended Emergency Contact Information  Primary Emergency Contact: EloisaBilly   [place]  Mobile Phone: [phone]  Relation: Daughter-In-Law  Secondary Emergency Contact: Rosita Masterson   [facility]  Home Phone: [phone]  Relation: Child     PCP:  Adrien Cardoza MD  Specialists:    Care Team            Adrien Cardoza MD   [phone] PCP - General, Family Medicine        [facility] Care Coordinator- Crissy Reza   [phone] Registered Nurse    [facility] Care Coordination Care Guide, Clinic Care Coordination    PH: [phone] Fax: [phone]     [facility]   [phone] Patient Care Assistant    Alt#: [phone]        Accomplishments:  Goals        Patient Stated      COMPLETED: I want support to help me better manage my breathing at home.  (pt-stated)      Action steps to achieve this goal  1.  I will talk with care guide at outreach calls.    2.  I will tell my PCA/family  to close the windows on hot humid and bad air days. (Ongoing and completing)  3.  I will use my inhalers and nebulizer as directed to help me with breathing daily. (Ongoing and completing)    Date goal set:  06/15/18  Discussed: 7/17/18; 9/13/18; 10/15/18        COMPLETED: I want to manage my GI concerns. (pt-stated)      Action steps to achieve this goal  1.  (Per Billy) I will answer my phone and schedule Kulwant for a GI appointment.  2.  (Per Billy) I will make sure Kulwant avoids tomatoes and citrus foods.  3.  (Per Billy) Kulwant completed a GI appointment on 10/24 and will complete a colonoscopy in January 2019.    Date goal set:  10/15/18    Discussed: 11/14/18; 2/7/2019            Advanced Directive/Living Will: On file with the clinic    Clinical Emergency Plan  Emergency Plan Recommendation:     When to Use the Emergency Department (ED)  An emergency means you could die if you don t get care quickly. Or you could be hurt permanently (disabled). Read below to know when to use -- and when not to use -- an emergency department (also called ED).     Dangers to your life  Here are examples of emergencies. These need immediate care:  A hard time breathing  Severe chest pain  Choking  Severe bleeding  Suddenly not able to move or speak  Blacking out (fainting)`  Poisoning     Dangers of permanent injuries  Here are other emergencies. These also need immediate care:  Deep cuts or severe burns  Broken bones, or sudden severe pain and swelling in a joint     When it s an emergency  If you have an emergency, follow these steps:     1. Go to the nearest emergency department  If you can, go to the hospital ED closest to you right away.  If you cannot get there right away, or if it is not safe to take yourself, call 911 or your police emergency number.  2. Call your primary care doctor  Tell your doctor about the emergency. Call within 24 hours of going to the ED.  If you cannot call, have  someone call for you.  Go to your doctor (not the ED) for any follow-up care.     When it s not an emergency  If a problem is not an emergency, follow these steps:     1. Call your primary care doctor  If you don t know the name of your doctor, call your health plan.  If you cannot call, have someone call for you.  2. Follow instructions  Your doctor will tell you what you should do.  You may be told to see your doctor right away. You may be told to go to the ED. Or you may be told to go to an urgent care center.  Follow your doctor s advice.  High Blood Pressure (Hypertension)  You have been diagnosed with high blood pressure (also called hypertension). This means the force of blood against your artery walls is too strong. It also means your heart is working hard to move blood. High blood pressure usually has no symptoms, but over time, it can damage your heart, blood vessels, eyes, kidneys, and other organs. With help from your doctor, you can manage your blood pressure and protect your health.  Taking medications    Learn to take your own blood pressure. Keep a record of your results. Ask your doctor which readings mean that you need medical attention.    Take your blood pressure medication exactly as directed. Don t skip doses. Missing doses can cause your blood pressure to get out of control.    Avoid medications that contain heart stimulants, including over-the-counter drugs. Check for warnings about high blood pressure on the label.    Check with your doctor before taking a decongestant. Some decongestants can worsen high blood pressure.  Lifestyle changes    Maintain a healthy weight. Get help to lose any extra pounds.    Cut back on salt.  ? Limit canned, dried, packaged, and fast foods.  ? Don t add salt to your food at the table.  ? Season foods with herbs instead of salt when you cook.    Follow the DASH (Dietary Approaches to Stop Hypertension) eating plan. This plan recommends vegetables, fruits, whole  gains, and other heart healthy foods.    Begin an exercise program. Ask your doctor how to get started. The American Heart Association recommends aerobic exercise 3 to 4 times a week for an average of 40 minutes at a time, with your doctor's approval. Simple activities like walking or gardening can help.    Break the smoking habit. Enroll in a stop-smoking program to improve your chances of success. Ask your health care provider about programs and medications to help you stop smoking.    Limit drinks that contain caffeine (coffee, black or green tea, cola) to 2 per day.    Never take stimulants such as amphetamines or cocaine; these drugs can be deadly for someone with high blood pressure.    Control your stress. Learn stress-management techniques.    Limit alcohol to no more than 1 drink a day for women and 2 drinks a day for men.  Follow-up care  Make a follow-up appointment as directed by our staff.     When to seek medical care  Call your doctor immediately if you have any of the following:    Chest pain or shortness of breath (call 911)    Moderate to severe headache    Weakness in the muscles of your face, arms, or legs    Trouble speaking    Extreme drowsiness    Confusion    Fainting or dizziness    Pulsating or rushing sound in your ears    Unexplained nosebleed    Weakness, tingling, or numbness of your face, arms, or legs    Change in vision    Blood pressure measured at home that is greater than 180/110      Long-Term Complications of Diabetes can cause health problems over time. These are called complications. They are more likely to happen if your blood sugar is often too high. Over time, high blood sugar can damage blood vessels in your body. It is important to keep your blood sugar in your target range. This can help prevent or delay complications from diabetes.  Possible complications  Complications of diabetes include:    Eye problems, including damage to the blood vessels in the eyes  (retinopathy), pressure in the eye (glaucoma), and clouding of the eye s lens (a cataract). Eye problems can eventually lead to irreversible blindness.     Tooth and gum problems (periodontal disease), causing loss of teeth and bone    Blood vessel (vascular) disease leading to circulation problems, heart attack or stroke, or a need for amputation of a limb     Problems with sexual function leading to erectile dysfunction in men and sexual discomfort in women     Kidney disease (nephropathy) can eventually lead to kidney failure, which may require dialysis or kidney transplant     Nerve problems (neuropathy), causing pain or loss of feeling in your feet and other parts of your body, potentially leading to an amputation of a limb     High blood pressure (hypertension), putting strain on your heart and blood vessels    Serious infections, possibly leading to loss of toes, feet, or limbs  How to avoid complications  The serious consequences of these complications may be avoidable for most people with diabetes by managing your blood glucose, blood pressure, and cholesterol levels. This can help you feel better and stay healthy. You can manage diabetes by tracking your blood sugar. You can also eat healthy and exercise to avoid gaining weight. And you should take medicine if directed by your healthcare provider.  Call your health care provider if:    You vomit or have diarrhea for more than 6 hours.    Your blood glucose level is higher than usual or over 250 mg/dL after you have taken extra insulin (if recommended in your sick-day plan).    You take oral medicine for diabetes, and your blood sugar is higher than usual or over 250 mg/dL, before a meal and stays that high for more than 24 hours.    Your blood glucose is lower than usual or less than 70 mg/dL    You have moderate to large amounts of ketones in your blood or urine.    You aren t better after 2 days.       All [facility] clinic patients have access to a  Nurse 24 hours a day, 7 days a week.  If you have questions or want advice from a Nurse, please know [facility] is here for you.  You can call your clinic and they will connect you or you can call [facility] at [phone].  [facility] also has Walk In Care clinics in multiple locations.  Call the number listed above for more information about our Walk In Care clinics or visit the [facility] website at [email or web address].

## 2021-06-23 NOTE — TELEPHONE ENCOUNTER
----- Message from Lucia Jacobs sent at 1/22/2019  3:46 PM CST -----  Contact: Billy (relative)  Caller: Billy    Primary cardiologist: Dr. Cano    Detailed reason for call: Billy peterson Kulwant received a phone call today that stated to call back [facility]. Please return call if there are results from recent tests. Call Billy [phone].    New or active symptoms? No    Best phone number: [phone]  Best time to contact: Any  Ok to leave a detailed message? Yes

## 2021-06-23 NOTE — PROGRESS NOTES
ASSESMENT AND PLAN:  Diagnoses and all orders for this visit:    Bronchitis  We will try prednisone taper dose and add Humalog sliding scale insulin for diabetes.  Follow-up in a few weeks.  Call if symptoms worsen.    Cough  -     XR Chest 2 Views  Lungs clear with no signs of pneumonia on x-ray.    Type 2 diabetes mellitus treated without insulin (H)  Continue metformin.  Added Humalog sliding scale to check blood sugar 3 times a day and use as directed.  Offered insulin teaching but daughter-in-law states she will asked the pharmacist when she picks up the medication instead.    S/P coronary artery stent placement  -     clopidogrel (PLAVIX) 75 mg tablet; Take 1 tablet (75 mg total) by mouth daily.  Dispense: 90 tablet; Refill: 0    Other orders  -     predniSONE (DELTASONE) 10 mg tablet; Take 4 tabs daily for 3 days, then 3 tabs daily for 3 days, then 2 tabs daily for 3 days, then 1 tab daily for 3 days, then stop..  Dispense: 30 tablet; Refill: 0  -     insulin lispro (HUMALOG KWIKPEN INSULIN) 100 unit/mL pen; Use per sliding scale < 150 - No insulin, 151-200 use 3 U, 201- 250 use 5 U,   251- 300 use 8 U  Dispense: 2 adj dose pen; Refill: 0      Spent 25 min face to face with patient with more the 50% spent in counseling and coordination of care and discussing problems listed above.        SUBJECTIVE: Kulwant Amin is a 49-year-old female with history of multiple medical conditions including but not limited to diabetes, coronary artery disease status post stent placement, chronic bronchitis, chronic cough, hyperlipidemia and others here with ongoing cough for the past 4 weeks.  She was seen a few weeks ago, was given short course oral steroids, antibiotic and codeine cough syrup.  States the symptoms are not improving.  She does not know if she has fever.  Uses all her inhalers as prescribed.  She continued to complain about chest pain.  She was referred to rapid access clinic.  Stress test was unremarkable.   "Holter monitor showed normal results.    Past Medical History:   Diagnosis Date     Acute blood loss anemia      Anxiety      Arthritis      Asthma      Chest wall pain 2017     COPD (chronic obstructive pulmonary disease) (H)      Depression      Diabetes mellitus (H)      Generalized headaches      History of anesthesia complications     N/V     HTN (hypertension)      Lactic acid acidosis 2018     Pneumonia      SVT (supraventricular tachycardia) (H)      Unstable angina (H) 10/5/2018     Patient Active Problem List   Diagnosis     HTN (hypertension)     Simple chronic bronchitis (H)     GERD (gastroesophageal reflux disease)     SVT (supraventricular tachycardia) (H)     CAD (coronary artery disease)     Type 2 diabetes mellitus treated without insulin (H)     S/P coronary artery stent placement     Moderate persistent asthma     Migraine headache     Cataract     Hyperlipidemia     Rectal bleed     GI bleeding     Lower GI bleeding     Rectal bleeding       Allergies:  No Known Allergies    Social History     Tobacco Use   Smoking Status Former Smoker     Last attempt to quit:      Years since quittin.1   Smokeless Tobacco Never Used       Review of systems otherwise negative except as listed in HPI.   Social History     Tobacco Use   Smoking Status Former Smoker     Last attempt to quit:      Years since quittin.1   Smokeless Tobacco Never Used       OBJECTICE: /66 (Patient Site: Right Arm, Patient Position: Sitting, Cuff Size: Adult Regular)   Pulse 80   Temp 98.4  F (36.9  C) (Oral)   Ht 5' 4\" (1.626 m)   Wt 130 lb 6 oz (59.1 kg)   SpO2 99%   BMI 22.38 kg/m      DATA REVIEWED:    Radiology Tests Summarized or Ordered (1):         GEN-alert,  in no apparent distress.  HEENT-mucous membranes are moist, neck is supple.  CV-regular rate and rhythm with no murmur.   RESP-mild wheezing with coarse breath sounds but no crackles or rhonchi.  ABDOMEN- Soft , not " tender.  SKIN-normal        [facility]   2/8/2019

## 2021-06-23 NOTE — PROGRESS NOTES
ASSESMENT AND PLAN:  Diagnoses and all orders for this visit:    Syncope, unspecified syncope type  -     [facility]ulatory referral to [facility]    SVT (supraventricular tachycardia) (H)  -     Ambulatory referral to [facility]    Coronary artery disease involving native coronary artery of native heart without angina pectoris  Managed by Cardiology  Appt scheduled in 2/19.    Essential hypertension  Controlled.    Type 2 diabetes mellitus treated without insulin (H)  -     Comprehensive Metabolic Panel  Last A1c 6.8 in 11/18    Anemia, unspecified type  -     HM1(CBC and Differential)  -     HM1 (CBC with Diff)    Shortness of breath  Patient is requesting home oxygen.  O2 sat 100% in room air today.  O2 sats after walking 99%.  No indication for home oxygen.  Will recheck O2 sat at rest and after exertion at next visit.    Pain in both knees, unspecified chronicity  -     Ambulatory referral to Adult PT- Internal  PT referral for wheelchair assessment.    This transcription uses voice recognition software, which may contain typographical errors.        SUBJECTIVE: Kulwant Amin is a 49-year-old female with history of coronary artery disease, SVT, hypertension, hyperlipidemia and asthma here with the with the complaint of syncopal episodes and palpitations.  She is here with her daughter-in-law who is her primary caregiver.  Daughter-in-law reports that she has been having syncopal episodes lasting 10-15 minutes for the past 4-5 weeks.  No fall reported.  She has been having these episodes once or twice a week.  Patient states she feels lightheaded prior to this episode.  She underwent coronary angiography with PCI with stent placement last year.  She takes Lopressor, aspirin and Plavix daily. She had  event recorder from March 2018 through April 2018 revealed no recurrent arrhythmia.  Her last cardiology visit was in June 2018.    She has home health nurse visiting her every week.  Daughter-in-law  reports that her oxygen saturation has been low in the 70s and 80s per home health nurse.  Patient is wondering if she needs home oxygen.    Patient is also requesting wheelchair.  She gets short of breath easily due to her asthma.  She cannot walk long distance due to her knee pain.    She denied acute chest pain or palpitations today.  Denied acute vision changes.  She does not have acute wheezing or shortness of breath today.  No fever or URI symptoms.    Past Medical History:   Diagnosis Date     Anxiety      Arthritis      Asthma      Back pain      COPD (chronic obstructive pulmonary disease) (H)      Depression      Diabetes mellitus (H)      Generalized headaches      History of anesthesia complications     N/V     HTN (hypertension)      Pneumonia      SVT (supraventricular tachycardia) (H)      Patient Active Problem List   Diagnosis     HTN (hypertension)     Simple chronic bronchitis (H)     Pain     GERD (gastroesophageal reflux disease)     SVT (supraventricular tachycardia) (H)     Chest wall pain     Asthma without status asthmaticus     CAD (coronary artery disease)     Acute blood loss anemia     Abdominal pain     Acute midline low back pain without sciatica     Type 2 diabetes mellitus treated without insulin (H)     S/P coronary artery stent placement     Moderate persistent asthma     Migraine headache     Cataract     Epigastric pain     Unstable angina (H)     Hyperlipidemia     Shortness of breath     Lactic acid acidosis     Dyspnea     Hematochezia     Abdominal pain, left lower quadrant     Rectal bleed     GI bleeding     Lower GI bleeding     Rectal bleeding       Allergies:  No Known Allergies    Social History     Tobacco Use   Smoking Status Former Smoker     Last attempt to quit:      Years since quittin.0   Smokeless Tobacco Never Used       Review of systems otherwise negative except as listed in HPI.   Social History     Tobacco Use   Smoking Status Former Smoker      "Last attempt to quit: 2003     Years since quittin.0   Smokeless Tobacco Never Used       OBJECTICE: /74 (Patient Site: Left Arm, Patient Position: Sitting, Cuff Size: Adult Regular)   Pulse 86 Comment: walking :109  Temp 98  F (36.7  C) (Oral)   Ht 5' 4\" (1.626 m)   Wt 129 lb 7 oz (58.7 kg)   SpO2 99% Comment: 100% walking  BMI 22.22 kg/m      DATA REVIEWED:  Additional History from Old Records Summarized (2):  Labs Reviewed or Ordered (1):       GEN-alert,  in no apparent distress.  HEENT-mucous membranes are moist, neck is supple.  CV-regular rate and rhythm with no murmur.   RESP-lungs clear to auscultation .  ABDOMEN- Soft , not tender.  EXTREM- No edema.  SKIN-normal        Adrien Cardoza   2019   "

## 2021-06-23 NOTE — TELEPHONE ENCOUNTER
Upcoming Appointment Question  When is the appointment: Tomorrow  What is your appointment for?: Medication check   Who is your appointment scheduled with?: PCP only  What is your question/concern?: Discuss oxygen as patient has been more shortness of breath with any exertion. Also discuss ordering wheelchair.   Okay to leave a detailed message?: Yes    Requesting these two times discussed during OV.

## 2021-06-23 NOTE — TELEPHONE ENCOUNTER
RN cannot approve Refill Request    RN can NOT refill this medication med is not covered by policy/route to provider. Last office visit: Visit date not found Last Physical: Visit date not found Last MTM visit: Visit date not found Last visit same specialty: 1/11/2019 Adrien Cardoza MD.  Next visit within 3 mo: Visit date not found  Next physical within 3 mo: Visit date not found      Lesia Dewitt, [facility] Triage/Med Refill 1/14/2019    Requested Prescriptions   Pending Prescriptions Disp Refills     promethazine-dextromethorphan (PROMETHAZINE-DM) 6.25-15 mg/5 mL syrup [Pharmacy Med Name: PROMETHAZINE-DM SYRUP 6.25-15 SYRP] 150 mL 0     Sig: TAKE 5 ML BY MOUTH 4 (FOUR) TIMES A DAY AS NEEDED FOR COUGH.    There is no refill protocol information for this order

## 2021-06-23 NOTE — TELEPHONE ENCOUNTER
Medication Question or Clarification  Who is calling: Pharmacy: [facility]  What medication are you calling about?:   insulin lispro (HUMALOG KWIKPEN INSULIN) 100 unit/mL pen 2 adj dose pen 0 2/8/2019     Sig: Use per sliding scale < 150 - No insulin, 151-200 use 3 U, 201- 250 use 5 U,   251- 300 use 8 U    Sent to pharmacy as: insulin lispro (HUMALOG KWIKPEN INSULIN) 100 unit/mL pen        Who prescribed the medication?: Dr Cardoza  What is your question/concern?: Pharmacy needs to know how many times a day can patient inject.  Please call and clarify.  Pharmacy: [facility]  Okay to leave a detailed message?: Yes  Site CMT - Please call the pharmacy to obtain any additional needed information.

## 2021-06-23 NOTE — TELEPHONE ENCOUNTER
RN cannot approve Refill Request    RN can NOT refill this medication medication not on med list. Last office visit: 1/18/2019 Adrien Cardoza MD Last Physical: 9/7/2018 Last MTM visit: Visit date not found Last visit same specialty: 1/18/2019 Adrien Cardoza MD.  Next visit within 3 mo: Visit date not found  Next physical within 3 mo: Visit date not found      Sheyla Turk, [facility] Triage/Med Refill 2/5/2019    Requested Prescriptions   Pending Prescriptions Disp Refills     famotidine (PEPCID) 40 MG tablet [Pharmacy Med Name: FAMOTIDINE 40 MG TABLET 40 TAB] 40 tablet 1     Sig: TAKE 1 PILL (40 MG) BY MOUTH TWICE DAILY/ TXMAYTEA HNUB NOJ 1 LUB TSHUAMARVA 2 PEDRO LUIS JAMES.    GI Medications Refill Protocol Passed - 2/2/2019  3:05 PM       Passed - PCP or prescribing provider visit in last 12 or next 3 months.    Last office visit with prescriber/PCP: 1/18/2019 Adrien Cardoza MD OR same dept: 1/18/2019 Adrien Cardoza MD OR same specialty: 1/18/2019 Adrien Cardoza MD  Last physical: 9/7/2018 Last MTM visit: Visit date not found   Next visit within 3 mo: Visit date not found  Next physical within 3 mo: Visit date not found  Prescriber OR PCP: Adrien Cardoza MD  Last diagnosis associated with med order: 1. Abdominal pain, epigastric  - famotidine (PEPCID) 40 MG tablet [Pharmacy Med Name: FAMOTIDINE 40 MG TABLET 40 TAB]; TAKE 1 PILL (40 MG) BY MOUTH TWICE DAILY/ TXHUA HNUB NOJ 1 LUB TSHUAJ 2 PEDRO LUIS OSPINA PLAB.  Dispense: 40 tablet; Refill: 1    If protocol passes may refill for 12 months if within 3 months of last provider visit (or a total of 15 months).

## 2021-06-23 NOTE — PROGRESS NOTES
Consultation - [facility]  Kulwant Amin,  1970, MRN [id number]    PCP: Adrien Cardoza MD, [phone]    Assessment and Plan: Lightheadedness.  History of SVT noted in the chart but not documented.  History of coronary disease with coronary stent placement.  Recommendations: Unfortunately very difficult to get a good assessment as to exactly what her concerns are.  Her review of systems is essentially positive for almost all symptoms listed.  Because of her inability to get good communication I I will arrange for a Holter monitor to assess for any arrhythmia and repeat stress test to follow-up on her previous intervention to determine if ischemia is a factor.    Chief Complaint: Lightheadedness    HPI:  We have been requested by Dr Cardoza to evaluate Kulwant Amin for consultation who is a  49 y.o. year old female for above chief complaint.  Hx: 49 M seen today in [facility]  Patient had been seen in our clinic in 2018.  At that time was complaining of epigastric and lower chest pain that was nonexertional and persistent for 15 days.  She was hospitalized and observed with a stress nuclear test showing no perfusion defects.  At time the impression that it was very likely the chest pain was of cardiac origin.  Patient did have a history of SVT.  CT angiogram was performed and abnormalities found.  Subsequent coronary angiography led to PCI of the proximal to mid LAD.  Seizures complicated by retroperitoneal hemorrhage requiring transfusion.  Report patient has a history of SVT with heart rate of up to 200.  Patient started on metoprolol therapy for medical management.  We will see him again in October for recurring chest and epigastric pain.  Holter patch imaging found a normal study with no evidence of SVT identified.  More recently patient was seen by primary care physician.  Was complaining of lightheadedness possible syncope and palpitations.  Information today was obtained through an .   She states to me that she has been having intermittent lightheadedness and sometimes feels that her heart is racing although not concurrent.  There was one episode where she may have lost consciousness and fell.  Unclear when this occurred.  But it is only occurred on one occasion.        Current Outpatient Medications:      albuterol (PROVENTIL) 2.5 mg /3 mL (0.083 %) nebulizer solution, Take 3 mL (2.5 mg total) by nebulization every 6 (six) hours as needed for wheezing., Disp: 75 mL, Rfl: 12     amitriptyline (ELAVIL) 10 MG tablet, TAKE 1 TABLET (10 MG TOTAL) BY MOUTH AT BEDTIME., Disp: 30 tablet, Rfl: 10     aspirin (ASPIR-LOW) 81 MG EC tablet, Take 1 tablet (81 mg total) by mouth daily., Disp: 90 tablet, Rfl: 2     atorvastatin (LIPITOR) 80 MG tablet, Take 1 tablet (80 mg total) by mouth at bedtime., Disp: 90 tablet, Rfl: 3     blood glucose meter (GLUCOMETER), Use 1 each As Directed 3 (three) times a day. Dispense glucometer brand per patient's insurance at pharmacy discretion.(E11.9), Disp: 1 each, Rfl: 0     blood glucose test strips, Use 1 each As Directed 3 (three) times a day. Dispense brand per patient's insurance at pharmacy discretion.(E11.9), Disp: 300 strip, Rfl: 3     clopidogrel (PLAVIX) 75 mg tablet, Take 1 tablet (75 mg total) by mouth daily., Disp: 90 tablet, Rfl: 0     cyanocobalamin 1000 MCG tablet, Take 1,000 mcg by mouth every evening., Disp: , Rfl:      doxylamine (UNISOM, DOXYLAMINE,) 25 mg tablet, Take 1 tablet (25 mg total) by mouth at bedtime as needed for sleep., Disp: 30 tablet, Rfl: 1     generic lancets, Use 1 each As Directed 3 (three) times a day. Dispense brand per patient's insurance at pharmacy discretion.(E11.9), Disp: 300 each, Rfl: 3     hydroCHLOROthiazide (MICROZIDE) 12.5 mg capsule, Take 1 capsule (12.5 mg total) by mouth daily., Disp: 90 capsule, Rfl: 2     MAPAP EXTRA STRENGTH 500 mg tablet, TAKE 1 PILL BY MOUTH EVERY 6 HOURS AS NEEDED FOR PAIN, Disp: 90 tablet, Rfl:  3     metFORMIN (GLUCOPHAGE) 500 MG tablet, TAKE 1 TABLET (500 MG TOTAL) BY MOUTH 2 TIMES A DAY WITH MEALS FOR DIABETES, Disp: 60 tablet, Rfl: 2     metoprolol tartrate (LOPRESSOR) 25 MG tablet, Take 1 tablet (25 mg total) by mouth 2 (two) times a day., Disp: 180 tablet, Rfl: 1     mometasone-formoterol (DULERA) 200-5 mcg/actuation HFAA inhaler, INHALE 2 PUFFS BY MOUTH TWO TIMES A DAY, Disp: 26 g, Rfl: 11     montelukast (SINGULAIR) 10 mg tablet, TAKE 1 PILL (10 MG TOTAL) BY MOUTH AT BEDTIME FOR ASTHMA, Disp: 30 tablet, Rfl: 10     omeprazole (PRILOSEC) 20 MG capsule, Take 1 capsule (20 mg total) by mouth 2 (two) times a day before meals., Disp: 90 capsule, Rfl: 1     polyethylene glycol (MIRALAX) 17 gram packet, Take 1 packet (17 g total) by mouth daily., Disp: 30 packet, Rfl: 0     VENTOLIN HFA 90 mcg/actuation inhaler, INHALE 2 PUFFS BY MOUTH EVERY 6 HOURS AS NEEDED FOR WHEEZING, Disp: 18 g, Rfl: 2  Medical History  Active Ambulatory (Non-Hospital) Problems    Diagnosis     GI bleeding     Lower GI bleeding     Hematochezia     Abdominal pain, left lower quadrant     Rectal bleed     Rectal bleeding     Dyspnea     Shortness of breath     Lactic acid acidosis     Hyperlipidemia     Unstable angina (H)     Epigastric pain     Cataract     Moderate persistent asthma     Migraine headache     S/P coronary artery stent placement     Type 2 diabetes mellitus treated without insulin (H)     Abdominal pain     Acute midline low back pain without sciatica     Acute blood loss anemia     CAD (coronary artery disease)     Asthma without status asthmaticus     SVT (supraventricular tachycardia) (H)     Chest wall pain     GERD (gastroesophageal reflux disease)     Pain     Simple chronic bronchitis (H)     HTN (hypertension)     Past Medical History:   Diagnosis Date     Anxiety      Arthritis      Asthma      Back pain      COPD (chronic obstructive pulmonary disease) (H)      Depression      Diabetes mellitus (H)       Generalized headaches      History of anesthesia complications      HTN (hypertension)      Pneumonia      SVT (supraventricular tachycardia) (H)        Surgical History  She  has a past surgical history that includes COLONOSCOPY with polypectomy using biopsy forceps (N/A, 12/10/2018); ESOPHAGOGASTRODUODENOSCOPY (EGD) (N/A, 12/10/2018); Coronary Angiogram (N/A, 1/25/2018); Percutaneous Coronary Intervention (N/A, 1/25/2018); Fractional Flow Ratio Wire (N/A, 1/25/2018); and Intravascular Ultrasound (N/A, 1/25/2018).    Social History  Reviewed, and she  reports that she quit smoking about 16 years ago. she has never used smokeless tobacco. She reports that she does not drink alcohol or use drugs.  Smoking status reviewed.  Social history othrwise not contributory to HPI.  Allergies  No Known Allergies    Family History  Reviewed, and family history is not on file.  Extended Emergency Contact Information  Primary Emergency Contact: Billy Amin   [place]  Mobile Phone: [phone]  Relation: Daughter-In-Law  Secondary Emergency Contact: Rosita Masterson   [facility]  Home Phone: [phone]  Relation: Child  Family history otherwise negative or not conributory to HPI.    Psychosocial Needs  Social History     Social History Narrative    12/22/2017 The patient lives with her daughter-in-law (who is present), , son, and 2 grandchildren (total of 6 people).      Additional psychosocial needs reviewed per nursing assessment.    Prior to Admission Medications    (Not in a hospital admission)    Review of Systems:  Review of systems not obtained due to inability to communicate with the patient.   Review of systems is negative except for HPI  Physical Exam:  Less than 10 mL of urine  Vitals:    01/14/19 1025   BP: 110/80   Pulse: 80   Resp: 18     Head and neck without focal cranial neurologic defects.  JVD not distended.  Carotid upstroke normal without bruit.  External eye exam normal  "without icterus.  External ear exam normal.  Neck without cervical lymphadenopathy or thyromegaly.  /80 (Patient Site: Left Arm, Patient Position: Sitting, Cuff Size: Adult Regular)   Pulse 80   Resp 18   Ht 5' 4\" (1.626 m)   Wt 129 lb (58.5 kg)   BMI 22.14 kg/m    General appearance: alert, appears stated age and cooperative  Lungs: clear to auscultation bilaterally  Heart: regular rate and rhythm, S1, S2 normal, no murmur, click, rub or gallop   Abdomen with normal bowel tones.  Skin without rash, ecchymosis, lesions.  Neuromuscular tone normal.  Peripheral pulse intact and equal.  Joints without swelling or erythema.      [unfilled]    Pertinent Labs  Lab Results: personally reviewed.   Lab Results   Component Value Date     01/11/2019    K 4.1 01/11/2019     01/11/2019    CO2 23 01/11/2019    BUN 9 01/11/2019    CREATININE 0.70 01/11/2019    CALCIUM 9.7 01/11/2019     Lab Results   Component Value Date    TROPONINI <0.01 12/08/2018     Lab Results   Component Value Date    WBC 6.5 01/11/2019    HGB 11.0 (L) 01/11/2019    HCT 34.5 (L) 01/11/2019    MCV 78 (L) 01/11/2019     01/11/2019     Lab Results   Component Value Date    CHOL 137 11/02/2018    TRIG 230 (H) 11/02/2018    HDL 50 11/02/2018    LDLDIRECT 57 06/20/2018       Pertinent Radiology  Radiology Results: See Report  EKG Results: See Report       Current Outpatient Medications:      albuterol (PROVENTIL) 2.5 mg /3 mL (0.083 %) nebulizer solution, Take 3 mL (2.5 mg total) by nebulization every 6 (six) hours as needed for wheezing., Disp: 75 mL, Rfl: 12     amitriptyline (ELAVIL) 10 MG tablet, TAKE 1 TABLET (10 MG TOTAL) BY MOUTH AT BEDTIME., Disp: 30 tablet, Rfl: 10     aspirin (ASPIR-LOW) 81 MG EC tablet, Take 1 tablet (81 mg total) by mouth daily., Disp: 90 tablet, Rfl: 2     atorvastatin (LIPITOR) 80 MG tablet, Take 1 tablet (80 mg total) by mouth at bedtime., Disp: 90 tablet, Rfl: 3     blood glucose meter (GLUCOMETER), " Use 1 each As Directed 3 (three) times a day. Dispense glucometer brand per patient's insurance at pharmacy discretion.(E11.9), Disp: 1 each, Rfl: 0     blood glucose test strips, Use 1 each As Directed 3 (three) times a day. Dispense brand per patient's insurance at pharmacy discretion.(E11.9), Disp: 300 strip, Rfl: 3     clopidogrel (PLAVIX) 75 mg tablet, Take 1 tablet (75 mg total) by mouth daily., Disp: 90 tablet, Rfl: 0     cyanocobalamin 1000 MCG tablet, Take 1,000 mcg by mouth every evening., Disp: , Rfl:      doxylamine (UNISOM, DOXYLAMINE,) 25 mg tablet, Take 1 tablet (25 mg total) by mouth at bedtime as needed for sleep., Disp: 30 tablet, Rfl: 1     generic lancets, Use 1 each As Directed 3 (three) times a day. Dispense brand per patient's insurance at pharmacy discretion.(E11.9), Disp: 300 each, Rfl: 3     hydroCHLOROthiazide (MICROZIDE) 12.5 mg capsule, Take 1 capsule (12.5 mg total) by mouth daily., Disp: 90 capsule, Rfl: 2     MAPAP EXTRA STRENGTH 500 mg tablet, TAKE 1 PILL BY MOUTH EVERY 6 HOURS AS NEEDED FOR PAIN, Disp: 90 tablet, Rfl: 3     metFORMIN (GLUCOPHAGE) 500 MG tablet, TAKE 1 TABLET (500 MG TOTAL) BY MOUTH 2 TIMES A DAY WITH MEALS FOR DIABETES, Disp: 60 tablet, Rfl: 2     metoprolol tartrate (LOPRESSOR) 25 MG tablet, Take 1 tablet (25 mg total) by mouth 2 (two) times a day., Disp: 180 tablet, Rfl: 1     mometasone-formoterol (DULERA) 200-5 mcg/actuation HFAA inhaler, INHALE 2 PUFFS BY MOUTH TWO TIMES A DAY, Disp: 26 g, Rfl: 11     montelukast (SINGULAIR) 10 mg tablet, TAKE 1 PILL (10 MG TOTAL) BY MOUTH AT BEDTIME FOR ASTHMA, Disp: 30 tablet, Rfl: 10     omeprazole (PRILOSEC) 20 MG capsule, Take 1 capsule (20 mg total) by mouth 2 (two) times a day before meals., Disp: 90 capsule, Rfl: 1     polyethylene glycol (MIRALAX) 17 gram packet, Take 1 packet (17 g total) by mouth daily., Disp: 30 packet, Rfl: 0     VENTOLIN HFA 90 mcg/actuation inhaler, INHALE 2 PUFFS BY MOUTH EVERY 6 HOURS AS NEEDED  FOR WHEEZING, Disp: 18 g, Rfl: 2

## 2021-06-23 NOTE — PROGRESS NOTES
Interp: [id number]      Care Guide called and spoke with Billy, patient's primary healthcare support person and daughter-in-law.    Billy reports that Kulwant is coughing and not feeling well. Care Guide explored if she would like Kulwant to come into the clinic before her next appointment which is scheduled for next week Tuesday 2/12 at 8:40am. She reports she would like Kulwant to be seen tomorrow if possible.      PCP appointment Friday 2/7 at 1:40pm      Billy reports no other concerns regarding Kulwant at this time. Billy reports she is caring well for Kulwant and does not feel they need additional support at this time.    Care Guide will send message to [facility] RN regarding [facility] Maintenance for this patient.      Next Outreach: 5/7/2019

## 2021-06-23 NOTE — PROGRESS NOTES
Physical Therapy  WHEELCHAIR: INITIAL EVALUATION   PHYSICAL THERAPY - OUTPATIENT       Eval Date: 2019   Rx Units:  Eval (45 minutes) W/C Management (15 minutes)   Total OP Minutes: 60    SUBJECTIVE    Medical Diagnosis and Date: Arthritis, Unknown   Date of last MD visit: 2019 Nani Jurado   Treatment Diagnosis: Gait Instability, Muscular Deconditioning, Falls Risk   Height: 5 foot 4 inches   Weight: 128 lbs  Precautions/PMH:   Past Medical History:   Diagnosis Date     Acute blood loss anemia      Anxiety      Arthritis      Asthma      Chest wall pain 2017     COPD (chronic obstructive pulmonary disease) (H)      Depression      Diabetes mellitus (H)      Generalized headaches      History of anesthesia complications     N/V     HTN (hypertension)      Lactic acid acidosis 2018     Pneumonia      SVT (supraventricular tachycardia) (H)      Unstable angina (H) 10/5/2018       [employer], Professional  present for eval    Living Situation: House One level home with bathroom, kitchen, and bedroom accessible on the same floor. 3 steps to get inside the house with no rail  Doorway/Room accessibility: TBD Will be assessed by NSM (ATP during w/c trial)  Lives: Lives with  2 child and daughter in law  Caregiver Support: 11 1/2 hours of PCA services a day    Current/Future Model of Transportation: NA   Where is w/c stored during transport? NA    Hours spent in wheelchair: Has NO w/c  How is wheelchair used throughout the day? Pt currently does not use a w/c at home   Current seating/mobility equipment: SEC and FWW  Years Old: NT  Problems with current equipment: NT   Cost of repairs: NT    Pain: Yes Ratin/10 Aching Pain   Location: Both LEs  Takes medicine for pain but relief is only temporary.   Pt also describes wrist pain Blanquita R  > L that is rated 7-8/10     How does pain interfere with mobility?: Walking increases pain to 8/10 All Mobility is very slow and antalgic due to  pain    Patient s Goal: Family would like pt to be able to easily access the rooms of her home and her community    OBJECTIVE    Baseline/Resting Vitals :  Oxygen Saturation (SaO2): 99% on room air        Heart Rate (HR): 93           Blood Pressure (BP): 131/74  Rate of Perceived Exertion (RPE): NT   Rate of Perceived Dyspnea (RPD): 5-6/10    Cognition: Memory: intact  Problem Solving: intact  Pt did have mild difficulty following instructions with testing  Judgement: intact Attn/Concentration: intact  Vision: intact  Hearing: intact  Comments: NA    Sensation: impaired     Comments: B LEs Pt had significant difficulty feeling significant touch. Unsure if pt was understanding testing  Proprioception: Impaired Comments: B LEs    History of Skin Breakdown/Pressure Sores: No       History of Swelling:   No     Comments: NA  Ability to Weight Shift: Yes  Comments: NA       Strength and ROM:  Pt had difficulty following directions for formal manual muscle testing  All testing performed in seated position  Lower Extremity Strength   L       R ROM L    R Comments Upper Extremity Strength    L      R     ROM L    R Comments   Hip Flexion L: 3+/5  R: 3/5  WFL [place] Painful Shoulder Flexion L: 3/5  R: 3+/5 80 degrees Blanquita due to pain Painful with B Flexion and Abduction   Hip Extension NT NT  Shoulder Extension NT NT    Hip Abduction Blanquita: 3+/5 NT  Shoulder Abd L: 3/5  R: 3/5 L: 60 degrees  R: 90 degrees [place]  Due to pain    Knee Ext L: 3/5  R: 3/5 WFL [place]  Elbow Flexion L: 3+/5  R: 3/5 WFL Blanquita    Knee Flexion B: 3/5 WFL  Blanquita  Elbow Ext L: 3/5  R: 3/5 Able to reach full extension but painful Blanquita at end range    Dorsi Flexion B: 3/5 WFL [place]  Wrist Flexion B 3/5 WFL Blanquita    Plantar Flex [place]: Fair WFL Blanquita  Wrist Ext B 3/5 WFL Blanquita        Finger Ext/Flex Fair  Blanquita WFL [place]      Comments: Pt experienced pain with any UE and LE movement, even during PROM     Tone:    Lower Extremity: WFL [place]   Upper Extremity: WFL [place]  Balance:     Seated: Static: Independent  Dynamic: SBA  Pt c/o pain with reaching and moving outside of the base of her support  Standing: Static: CGA   Dynamic: NT   Test/Score: TUG with SEC:  80 seconds CGA (Low falls risk is <13 sec) Considered high falls risk     ADL Status:    Independent Assist Unable Comments   Dressing  X     Bathing  X     Grooming/Hygiene   X     Feeding  X     Toileting  X     Meal Prep  X     Bowel/Bladder    Self-cathing in w/c?    Transfers:   Independent Assist Comments   Bed Mobility   NT'd   W/C ? Chair  Min A All movements are very labored for patient due to pain  Pt uses UE support to assist   Car   NT'd     Gait:  10M Walk Test  Assistive Device: SEC    Assistance: Min A    Distance: 30 feet Steps: 35   Gait Analysis: Antalgic, step to gait, no heel strike Blanquita  Comfortable Gait Speed;  6m in 48 seconds = 0.12 m/s     Activity Tolerance:    After Activity:  O2 Saturation NT      Heart Rate    NT          W/C Mobility Skills:     Independent  Assistance Comments/Tolerance (Fatigue, Dyspnea,Pain)   Manual W/C   TBD at home trial   Scooter      Power W/C        Other: CARMEN Lane from [facility] [employer] ([facility]) was present to assist with w/c assessmentas well as pt's daughter in law (PCA) and son was present at [facility]. An optimally configured manual w/c will be appropriate for pt as pt is currently experiencing significant amounts of pain and will improve her safety, functional mobility, and independence within her home and the community. It is recommend for pt to trial a light weight supra christy manual w/c due to patient's size. Following PT evaluation, vendor ([facility]) will perform home trial and in-home assessment.  PT will complete a Letter of Medical Necessity for equipment justification and accessories.

## 2021-06-23 NOTE — TELEPHONE ENCOUNTER
Returned call to Billy and reviewed results/recommendations per Dr. Cano. Billy verbalized understanding and offered no other questions or concerns at this time. -ian

## 2021-06-23 NOTE — PROGRESS NOTES
ASSESMENT AND PLAN:  Diagnoses and all orders for this visit:    Bronchitis  -     codeine-guaiFENesin (GUAIFENESIN AC)  mg/5 mL liquid; Take 5-10 mL by mouth every 6 (six) hours as needed for cough.  Dispense: 240 mL; Refill: 0  -     predniSONE (DELTASONE) 20 MG tablet; Take 20 mg by mouth 2 (two) times a day for 5 days.  Dispense: 10 tablet; Refill: 0  -     azithromycin (ZITHROMAX Z-CLIFFORD) 250 MG tablet; 2 tabs (500 mg ) day #1, then 1 tab (250 mg) days #2-5, total 5 days  Dispense: 6 tablet; Refill: 0  Antibiotic prescribed due to multiple underlying conditions including diabetes and asthma.  Instructed to fill codeine cough syrup only after she finished her current cough syrup, discussed potential side effects including drowsiness and constipation.    This transcription uses voice recognition software, which may contain typographical errors.        SUBJECTIVE: Kulwant Amin is a 49-year-old female with multiple chronic medical conditions including asthma here with the complaint of worsening cough, started 1 week ago.  Cough it mostly dry cough but complaining of shortness of breath and wheezing with it.  As prescribed.  She denies fever or chill.  She denied acute chest pain or palpitations.  Robitussin was sent to her pharmacy yesterday, she started taking it since last night, no improvement yet.    Past Medical History:   Diagnosis Date     Anxiety      Arthritis      Asthma      Back pain      COPD (chronic obstructive pulmonary disease) (H)      Depression      Diabetes mellitus (H)      Generalized headaches      History of anesthesia complications     N/V     HTN (hypertension)      Pneumonia      SVT (supraventricular tachycardia) (H)      Patient Active Problem List   Diagnosis     HTN (hypertension)     Simple chronic bronchitis (H)     Pain     GERD (gastroesophageal reflux disease)     SVT (supraventricular tachycardia) (H)     Chest wall pain     Asthma without status asthmaticus     CAD  "(coronary artery disease)     Acute blood loss anemia     Abdominal pain     Acute midline low back pain without sciatica     Type 2 diabetes mellitus treated without insulin (H)     S/P coronary artery stent placement     Moderate persistent asthma     Migraine headache     Cataract     Epigastric pain     Unstable angina (H)     Hyperlipidemia     Shortness of breath     Lactic acid acidosis     Dyspnea     Hematochezia     Abdominal pain, left lower quadrant     Rectal bleed     GI bleeding     Lower GI bleeding     Rectal bleeding       Allergies:  No Known Allergies    Social History     Tobacco Use   Smoking Status Former Smoker     Last attempt to quit:      Years since quittin.0   Smokeless Tobacco Never Used       Review of systems otherwise negative except as listed in HPI.   Social History     Tobacco Use   Smoking Status Former Smoker     Last attempt to quit:      Years since quittin.0   Smokeless Tobacco Never Used       OBJECTICE: BP 96/68 (Patient Site: Right Arm, Patient Position: Sitting, Cuff Size: Adult Regular)   Pulse 85   Temp 98.2  F (36.8  C) (Oral)   Ht 5' 4\" (1.626 m)   Wt 128 lb 4 oz (58.2 kg)   SpO2 99%   BMI 22.01 kg/m            GEN-alert,  in no apparent distress.  HEENT-mucous membranes are moist, neck is supple.  CV-regular rate and rhythm with no murmur.   RESP- course breath sounds but no crackles, rhonchi, mild wheezing noted.   ABDOMEN- Soft , not tender.  EXTREM- No edema.  SKIN-normal        Adrien Cardoza   2019   "

## 2021-06-24 NOTE — TELEPHONE ENCOUNTER
Request has been submitted via [facility]. Waiting for questions to generate before completing PA.

## 2021-06-24 NOTE — PROGRESS NOTES
Assessment/Plan:        There are no diagnoses linked to this encounter.  48-year-old woman with history of organic fuel exposure in the home.  Her PFTs have been previously noted not to be diagnostic due to poor effort and she presents with worsening cough and wheezing on exam consistent with an exacerbation of some obstructive pulmonary disease. For the present we would recommend;    Prednisone 40mg daily for 5 days.     Reminded her to use her Dulera inhaler two times a day as opposed to on a PRN basis. I reminded her through the  to rinse her mouth after using this.    Reminded her that she could use her albuterol up to 6 times a day for rescue.    I explained to her and her daughter in law who is here with her today that if her symptoms did not improve, that she should call us.    Tamra Duong  Pulmonary and Critical Care  [id number]      Subjective:    Patient ID: Kulwant Amin is a 49 y.o. female.    Ms. Amin is a 49 y.o.female with a past medical history significant for anxiety, arthritis, questionable asthma versus COPD, diabetes, hypertension and a prior history of SVT presents with a follow-up visit for her pulmonary complaints.  She describes that since the last time she was seen by us in clinic, she has been experiencing increasing cough and worsening shortness of breath so that she is using her rescue inhaler up to 4 times a day.  She has several nights when she wakes up at night with the same symptoms of coughing and shortness of breath.  In talking to her family is apparent that she has been using her Dulera at least once a day and sometimes for rescue.  She denies fevers, chills but endorses night sweats, heartburn, nausea, dizziness, palpitations, joint pain, depression and anxiety  Her ACT today is 2+ 1+1+1+3= 8      Pertinent past medical history:  48-year-old female here for follow-up.  Her breathing is a bit worse than 6 months ago.  She had multiple ER visits and evaluations.  This  includes negative PE study.  She had a cath with a 75% LAD lesion which was intervened upon.  Since her catheterization she feels she has more heartburn.  Her breathing is worse with exertion.  Improves with rest.  It is also worse with cold weather.  Warm weather and humid air does not bother.  Symptoms localized to the chest.  Pertinent positives include burning sensation in the chest.  Pertinent negatives include no fever or hemoptysis.    She is relatively inactive in the home.  She does report doing her cardiac rehab.  She tolerated this okay.    Review of Systems     Pertinent as noted in HPI.        Objective:    Physical Exam   Constitutional: She is oriented to person, place, and time. No distress.   Appears older than stated age.  Fatigue.   HENT:   Head: Normocephalic and atraumatic.   Nose: Nose normal.   Eyes: Conjunctivae are normal. Pupils are equal, round, and reactive to light. Right eye exhibits no discharge. Left eye exhibits no discharge. No scleral icterus.   Neck: Normal range of motion. No tracheal deviation present. No thyromegaly present.   Cardiovascular: Normal rate, regular rhythm and normal heart sounds. Exam reveals no gallop.   No murmur heard.  Pulmonary/Chest: Effort normal. No stridor. No respiratory distress. She has wheezes.   Lymphadenopathy:     She has no cervical adenopathy.   Neurological: She is alert and oriented to person, place, and time.   Skin: Skin is warm and dry. She is not diaphoretic. No erythema.   Psychiatric: Her behavior is normal. Thought content normal.   Depressed affect   Nursing note and vitals reviewed.          Current Outpatient Medications on File Prior to Visit   Medication Sig Dispense Refill     albuterol (PROVENTIL) 2.5 mg /3 mL (0.083 %) nebulizer solution Take 3 mL (2.5 mg total) by nebulization every 6 (six) hours as needed for wheezing. 75 mL 12     amitriptyline (ELAVIL) 10 MG tablet TAKE 1 TABLET (10 MG TOTAL) BY MOUTH AT BEDTIME. 30 tablet  10     aspirin (ASPIR-LOW) 81 MG EC tablet Take 1 tablet (81 mg total) by mouth daily. 90 tablet 2     atorvastatin (LIPITOR) 80 MG tablet Take 1 tablet (80 mg total) by mouth at bedtime. 90 tablet 3     blood glucose meter (GLUCOMETER) Use 1 each As Directed 3 (three) times a day. Dispense glucometer brand per patient's insurance at pharmacy discretion.(E11.9) 1 each 0     blood glucose test strips Use 1 each As Directed 3 (three) times a day. Dispense brand per patient's insurance at pharmacy discretion.(E11.9) 300 strip 3     clopidogrel (PLAVIX) 75 mg tablet Take 1 tablet (75 mg total) by mouth daily. 90 tablet 0     cyanocobalamin 1000 MCG tablet Take 1,000 mcg by mouth every evening.       famotidine (PEPCID) 40 MG tablet Take 40 mg by mouth daily.       generic lancets Use 1 each As Directed 3 (three) times a day. Dispense brand per patient's insurance at pharmacy discretion.(E11.9) 300 each 3     hydroCHLOROthiazide (MICROZIDE) 12.5 mg capsule Take 1 capsule (12.5 mg total) by mouth daily. 90 capsule 2     MAPAP EXTRA STRENGTH 500 mg tablet TAKE 1 PILL BY MOUTH EVERY 6 HOURS AS NEEDED FOR PAIN 90 tablet 3     metFORMIN (GLUCOPHAGE) 500 MG tablet TAKE 1 TABLET (500 MG TOTAL) BY MOUTH 2 TIMES A DAY WITH MEALS FOR DIABETES 60 tablet 2     metoprolol tartrate (LOPRESSOR) 25 MG tablet Take 1 tablet (25 mg total) by mouth 2 (two) times a day. 180 tablet 1     mometasone-formoterol (DULERA) 200-5 mcg/actuation HFAA inhaler INHALE 2 PUFFS BY MOUTH TWO TIMES A DAY 26 g 11     montelukast (SINGULAIR) 10 mg tablet TAKE 1 PILL (10 MG TOTAL) BY MOUTH AT BEDTIME FOR ASTHMA 30 tablet 10     omeprazole (PRILOSEC) 20 MG capsule Take 1 capsule (20 mg total) by mouth 2 (two) times a day before meals. 90 capsule 1     polyethylene glycol (MIRALAX) 17 gram packet Take 1 packet (17 g total) by mouth daily. 30 packet 0     VENTOLIN HFA 90 mcg/actuation inhaler INHALE 2 PUFFS BY MOUTH EVERY 6 HOURS AS NEEDED FOR WHEEZING 18 g 2      doxylamine (UNISOM, DOXYLAMINE,) 25 mg tablet Take 1 tablet (25 mg total) by mouth at bedtime as needed for sleep. 30 tablet 1     insulin lispro (HUMALOG KWIKPEN INSULIN) 100 unit/mL pen Use per sliding scale < 150 - No insulin, 151-200 use 3 U, 201- 250 use 5 U,   251- 300 use 8 U 2 adj dose pen 0     predniSONE (DELTASONE) 10 mg tablet Take 4 tabs daily for 3 days, then 3 tabs daily for 3 days, then 2 tabs daily for 3 days, then 1 tab daily for 3 days, then stop.. 30 tablet 0     promethazine-dextromethorphan (PROMETHAZINE-DM) 6.25-15 mg/5 mL syrup Take 5 mL by mouth 4 (four) times a day as needed for cough. 150 mL 0     No current facility-administered medications on file prior to visit.      Pulse 100   Resp 17   Wt 130 lb (59 kg)   SpO2 99% Comment: RA  BMI 22.31 kg/m      Medical History  Active Ambulatory (Non-Hospital) Problems    Diagnosis     GI bleeding     Lower GI bleeding     Rectal bleed     Rectal bleeding     Hyperlipidemia     Cataract     Moderate persistent asthma     Migraine headache     S/P coronary artery stent placement     Type 2 diabetes mellitus treated without insulin (H)     CAD (coronary artery disease)     SVT (supraventricular tachycardia) (H)     GERD (gastroesophageal reflux disease)     Simple chronic bronchitis (H)     HTN (hypertension)     Past Medical History:   Diagnosis Date     Acute blood loss anemia      Anxiety      Arthritis      Asthma      Chest wall pain 12/26/2017     COPD (chronic obstructive pulmonary disease) (H)      Depression      Diabetes mellitus (H)      Generalized headaches      History of anesthesia complications      HTN (hypertension)      Lactic acid acidosis 11/23/2018     Pneumonia      SVT (supraventricular tachycardia) (H)      Unstable angina (H) 10/5/2018        Surgical History  She  has a past surgical history that includes Coronary Angiogram (N/A, 1/25/2018); pr colonoscopy flx dx w/collj spec when pfrmd (N/A, 12/10/2018); and pr  esophagogastroduodenoscopy transoral diagnostic (N/A, 12/10/2018).       Social History  Reviewed, she lives at home with her family.   Allergies  No Known Allergies                             Data Review - imaging, labs, and ekgs listed below were reviewed by me.  Chest XRay and chest CT images and EKG tracings interpreted personally.     Past Labs  Hospital Outpatient Visit on 01/22/2019   Component Date Value     Pharmacologic Protocol  01/22/2019 Lexiscan      Test Type 01/22/2019 Pharmacological      Baseline HR 01/22/2019 70      Baseline BP 01/22/2019 124/72      Last Stress HR 01/22/2019 105      Last Stress BP 01/22/2019 139/75      PERCENT HR 01/22/2019 85%      ST Deviation Elevation 01/22/2019 aVL 0.2mm      Deviation Time 01/22/2019 II -1.0mm      ST Elevation Amount 01/22/2019 II 1.7mm      ST Deviation Amount he 01/22/2019 aVR -1.4mm      Final Resting BP 01/22/2019 137/78      Final Resting HR 01/22/2019 87      Max Treadmill Speed 01/22/2019 0.0      Max Treadmill Grade 01/22/2019 0.0      Peak Systolic BP 01/22/2019 139/75      Peak Diastolic BP 01/22/2019 127/93      Max HR 01/22/2019 120      Stress Phase 01/22/2019 Resting      Stress Resting Pt Positi* 01/22/2019 MANUAL EVENT      Current HR 01/22/2019 70      Current BP 01/22/2019 124/72      Stress Phase 01/22/2019 Stress      Stage Minute 01/22/2019 EXE 00:00      Exercise Stage 01/22/2019 STAGE 2      Current HR 01/22/2019 73      Current BP 01/22/2019 124/72      Stress Phase 01/22/2019 Stress      Stage Minute 01/22/2019 EXE 01:00      Exercise Stage 01/22/2019 STAGE 3      Current HR 01/22/2019 88      Current BP 01/22/2019 124/72      Stress Phase 01/22/2019 Stress      Stage Minute 01/22/2019 EXE 02:00      Exercise Stage 01/22/2019 STAGE 4      Current HR 01/22/2019 120      Current BP 01/22/2019 124/72      Stress Phase 01/22/2019 Stress      Stage Minute 01/22/2019 EXE 02:29      Exercise Stage 01/22/2019 STAGE 4      Current HR  01/22/2019 117      Current BP 01/22/2019 127/93      Stress Phase 01/22/2019 Stress      Stage Minute 01/22/2019 EXE 03:00      Exercise Stage 01/22/2019 STAGE 5      Current HR 01/22/2019 109      Current BP 01/22/2019 127/93      Stress Phase 01/22/2019 Stress      Stage Minute 01/22/2019 EXE 03:02      Exercise Stage 01/22/2019 STAGE 5      Current HR 01/22/2019 109      Current BP 01/22/2019 139/75      Stress Phase 01/22/2019 Stress      Stage Minute 01/22/2019 EXE 04:00      Exercise Stage 01/22/2019 STAGE 6      Current HR 01/22/2019 106      Current BP 01/22/2019 139/75      Stress Phase 01/22/2019 Stress      Stage Minute 01/22/2019 EXE 04:01      Exercise Stage 01/22/2019 STAGE 6      Current HR 01/22/2019 105      Current BP 01/22/2019 139/75      Stress Phase 01/22/2019 Recovery      Stage Minute 01/22/2019 REC 00:37      Exercise Stage 01/22/2019 Recovery      Current HR 01/22/2019 100      Current BP 01/22/2019 123/75      Stress Phase 01/22/2019 Recovery      Stage Minute 01/22/2019 REC 00:58      Exercise Stage 01/22/2019 Recovery      Current HR 01/22/2019 101      Current BP 01/22/2019 123/75      Stress Phase 01/22/2019 Recovery      Stage Minute 01/22/2019 REC 01:58      Exercise Stage 01/22/2019 Recovery      Current HR 01/22/2019 99      Current BP 01/22/2019 123/75      Stress Phase 01/22/2019 Recovery      Stage Minute 01/22/2019 REC 02:06      Exercise Stage 01/22/2019 Recovery      Current HR 01/22/2019 94      Current BP 01/22/2019 137/78      Stress Phase 01/22/2019 Recovery      Stage Minute 01/22/2019 REC 02:58      Exercise Stage 01/22/2019 Recovery      Current HR 01/22/2019 98      Current BP 01/22/2019 137/78      Stress Phase 01/22/2019 Recovery      Stage Minute 01/22/2019 REC 03:58      Exercise Stage 01/22/2019 Recovery      Current HR 01/22/2019 89      Current BP 01/22/2019 137/78      Stress Phase 01/22/2019 Recovery      Stage Minute 01/22/2019 REC 04:03      Exercise Stage  01/22/2019 Recovery      Current HR 01/22/2019 87      Current BP 01/22/2019 137/78      Calculated Percent HR 01/22/2019 71      Left Ventricular EF 01/22/2019 >70    Office Visit on 01/14/2019   Component Date Value     VENTRICULAR RATE 01/14/2019 82      ATRIAL RATE 01/14/2019 82      P-R INTERVAL 01/14/2019 150      QRS DURATION 01/14/2019 86      Q-T INTERVAL 01/14/2019 398      QTC CALCULATION (BEZET) 01/14/2019 464      P Axis 01/14/2019 48      R AXIS 01/14/2019 -7      T AXIS 01/14/2019 36      MUSE DIAGNOSIS 01/14/2019                      Value:Normal sinus rhythm  RSR' or QR pattern in V1 suggests right ventricular conduction delay  Borderline ECG  When compared with ECG of 07-DEC-2018 17:16,  No significant change was found  Confirmed by ADAN [place], [facility] LOC: ([id number]) on 1/14/2019 3:49:52 PM     Office Visit on 01/11/2019   Component Date Value     Sodium 01/11/2019 141      Potassium 01/11/2019 4.1      Chloride 01/11/2019 105      CO2 01/11/2019 23      Anion Gap, Calculation 01/11/2019 13      Glucose 01/11/2019 133*     BUN 01/11/2019 9      Creatinine 01/11/2019 0.70      GFR MDRD Af Amer 01/11/2019 >60      GFR MDRD Non Af Amer 01/11/2019 >60      Bilirubin, Total 01/11/2019 0.4      Calcium 01/11/2019 9.7      Protein, Total 01/11/2019 6.7      Albumin 01/11/2019 3.7      Alkaline Phosphatase 01/11/2019 105      AST 01/11/2019 17      ALT 01/11/2019 16      WBC 01/11/2019 6.5      RBC 01/11/2019 4.44      Hemoglobin 01/11/2019 11.0*     Hematocrit 01/11/2019 34.5*     MCV 01/11/2019 78*     MCH 01/11/2019 24.7*     MCHC 01/11/2019 31.8*     RDW 01/11/2019 14.6*     Platelets 01/11/2019 236      MPV 01/11/2019 9.2      CT chest images reviewed and interpreted by me: No pulmonary emboli.  Bring him is essentially clear.    Chest x-ray images reviewed and interpreted by me: Lungs essentially clear.  Xr Chest 2 Views    Result Date: 2/8/2019  XR CHEST 2 VIEWS 2/8/2019 2:09 PM INDICATION: Cough for  more than 4 weeks, no improvement with oral steroid and abx . h/o asthma/copd COMPARISON: 12/07/2018 and multiple prior studies. Chest CTA 11/23/2018 and multiple prior studies. FINDINGS: Calcified tiny pleural plaque in the right midlung. Better inspiration. Lungs are clear. Heart and pulmonary vascularity are normal. No signs of pneumonia.    Nm Pharmacologic Stress Test    Result Date: 1/22/2019    When compared to the images of 12/28/2017, there is no longer evidence of diffuse subendocardial ischemia.   The pharmacologic nuclear stress test is negative for inducible myocardial ischemia or infarction.   The left ventricular ejection fraction is greater than 70%.      Cardiac catheterization report reviewed by me: LAD lesion status post stent.

## 2021-06-24 NOTE — TELEPHONE ENCOUNTER
Pt relative here to inquire about med below.  Called pharmacy and it was not received on 2/6/19.  Called in script to [facility] and let relative know will be at pharmacy today.     Can PCP please sign off med.  Thanks.

## 2021-06-24 NOTE — TELEPHONE ENCOUNTER
RN cannot approve Refill Request    RN can NOT refill this medication historical medication requested    Last office visit: 2/8/2019 Adrien Cardoza MD Last Physical: 9/7/2018 Last MTM visit: Visit date not found Last visit same specialty: 2/8/2019 Adrien Cardoza MD.  Next visit within 3 mo: Visit date not found  Next physical within 3 mo: Visit date not found      Anitha Ghotra, [facility] Triage/Med Refill 3/14/2019    Requested Prescriptions   Pending Prescriptions Disp Refills     famotidine (PEPCID) 40 MG tablet [Pharmacy Med Name: FAMOTIDINE 40 MG TABLET 40 TAB] 60 tablet 0     Sig: TAKE 1 PILL (40 MG) BY MOUTH TWICE DAILY FOR STOMACH    GI Medications Refill Protocol Passed - 3/8/2019  5:03 PM       Passed - PCP or prescribing provider visit in last 12 or next 3 months.    Last office visit with prescriber/PCP: 2/8/2019 Adrien Cardoza MD OR same dept: 2/8/2019 Adrien Cardoza MD OR same specialty: 2/8/2019 Adrien Cardoza MD  Last physical: 9/7/2018 Last MTM visit: Visit date not found   Next visit within 3 mo: Visit date not found  Next physical within 3 mo: Visit date not found  Prescriber OR PCP: Adrien Cardoza MD  Last diagnosis associated with med order: 1. Abdominal pain, epigastric  - famotidine (PEPCID) 40 MG tablet [Pharmacy Med Name: FAMOTIDINE 40 MG TABLET 40 TAB]; TAKE 1 PILL (40 MG) BY MOUTH TWICE DAILY FOR STOMACH  Dispense: 60 tablet; Refill: 0    2. GERD (gastroesophageal reflux disease)  - omeprazole (PRILOSEC) 20 MG capsule [Pharmacy Med Name: OMEPRAZOLE DR 20 MG CAPSULE 20 CAP]; Take 1 capsule (20 mg total) by mouth 2 (two) times a day before meals.  Dispense: 90 capsule; Refill: 1    If protocol passes may refill for 12 months if within 3 months of last provider visit (or a total of 15 months).             omeprazole (PRILOSEC) 20 MG capsule [Pharmacy Med Name: OMEPRAZOLE DR 20 MG CAPSULE 20 CAP] 90 capsule 1     Sig: TAKE 1 CAPSULE (20 MG TOTAL) BY MOUTH 2 (TWO) TIMES A DAY BEFORE MEALS.    GI Medications Refill  Protocol Passed - 3/8/2019  5:03 PM       Passed - PCP or prescribing provider visit in last 12 or next 3 months.    Last office visit with prescriber/PCP: 2/8/2019 Adrien Cardoza MD OR same dept: 2/8/2019 Adrien Cardoza MD OR same specialty: 2/8/2019 Adrien Cardoza MD  Last physical: 9/7/2018 Last MTM visit: Visit date not found   Next visit within 3 mo: Visit date not found  Next physical within 3 mo: Visit date not found  Prescriber OR PCP: Adrien Cardoza MD  Last diagnosis associated with med order: 1. Abdominal pain, epigastric  - famotidine (PEPCID) 40 MG tablet [Pharmacy Med Name: FAMOTIDINE 40 MG TABLET 40 TAB]; TAKE 1 PILL (40 MG) BY MOUTH TWICE DAILY FOR STOMACH  Dispense: 60 tablet; Refill: 0    2. GERD (gastroesophageal reflux disease)  - omeprazole (PRILOSEC) 20 MG capsule [Pharmacy Med Name: OMEPRAZOLE DR 20 MG CAPSULE 20 CAP]; Take 1 capsule (20 mg total) by mouth 2 (two) times a day before meals.  Dispense: 90 capsule; Refill: 1    If protocol passes may refill for 12 months if within 3 months of last provider visit (or a total of 15 months).

## 2021-06-24 NOTE — TELEPHONE ENCOUNTER
Name of form/paperwork: forms from [facility] sent in prior  Have you been seen for this request: N/A  Do we have the form: Yes- yes this was faxed 10/20/18, 12/19/18 and again today  When is form needed by: ASAP  How would you like the form returned: via fax  Fax Number: [phone]  Patient Notified form requests are processed in 3-5 business days: Yes  (If patient needs form sooner, please note that in this message.) Needs these ASAP as been trying since October  Okay to leave a detailed message? Yes

## 2021-06-24 NOTE — TELEPHONE ENCOUNTER
Spoke with Kulwant at request of Dr. barragan to see how she doing??     [address] [place]  #[id number]    Patient states her breathing is somewhat better now. She has NO cough.  She is using her Dulera twice daily and will continue this.  Also, instructed that she can use her albuterol in between if needing additional relief.

## 2021-06-24 NOTE — TELEPHONE ENCOUNTER
Previous forms already signed.   Have not seen one from today.    Dr. Adrien Cardoza  2/19/2019 4:00 PM

## 2021-06-24 NOTE — PATIENT INSTRUCTIONS - HE
1. Please take 40 mg of prednisone daily for 5 days.  2. If you are still short of breath please call us and we will determine if we want to add on another inhaler.  3. Please use your Dulera inhaler twice a day EVERY DAY, if or not you are short of breath, this is not a rescue inhaler so do not use this if you are acutely short of breath.  4. Please be sure to gargle your mouth after using your Dulera inhaler.  5. Please use your albuterol inhaler 2 puffs up to 4 times a day for rescue. If you are not short of breath, you do not need to use this.

## 2021-06-25 ENCOUNTER — OFFICE VISIT - HEALTHEAST (OUTPATIENT)
Dept: PULMONOLOGY | Facility: OTHER | Age: 51
End: 2021-06-25

## 2021-06-25 DIAGNOSIS — J45.51 SEVERE PERSISTENT ASTHMA WITH ACUTE EXACERBATION (H): ICD-10-CM

## 2021-06-25 ASSESSMENT — MIFFLIN-ST. JEOR: SCORE: 1096.44

## 2021-06-25 NOTE — ED TRIAGE NOTES
Patient arrives by private car for evaluation of low back pain x 1 week.  Took Tylenol at home without relief.

## 2021-06-25 NOTE — TELEPHONE ENCOUNTER
Dr. Marie   This person called and is requesting a call back:    Name Of Person Who Called: Kulwant    Why Did The Person Call: patient claims Faserera injections making bones hurt     Best Phone Number To Call Back: [phone]  Yes   Okay To Leave A Detailed Voicemail?     Thank you.

## 2021-06-25 NOTE — TELEPHONE ENCOUNTER
Spoke to patient and advised of approval for new Rx.  Patient and daughter in law will await phone call from [facility] pharmacy to set up shipment.

## 2021-06-25 NOTE — TELEPHONE ENCOUNTER
Please call daughter Billy.  Wondering if she is having a reaction to Fasenra.  Told them to go to ER if she gets bad

## 2021-06-25 NOTE — TELEPHONE ENCOUNTER
Called Billy, her mom had second injection on 5/13 she thinks and is having itchy and body pain all over her body. No illness reported and no new meds started. Wonders what to do?

## 2021-06-25 NOTE — TELEPHONE ENCOUNTER
Called Billy and Kulwant is in the ER this morning. For dizziness, if she gets out in time, she'll bring her at 4:00. I will put her on the schedule now, in case she is able to come. Gave Billy [phone] to call and let us know.

## 2021-06-25 NOTE — TELEPHONE ENCOUNTER
famotidine (PEPCID) 40 MG tablet (Discontinued) 60 tablet 0 2/5/2019 2/8/2019 No   Sig: TAKE 1 PILL (40 MG) BY MOUTH TWICE DAILY/ DOMENIC CARABALLO NOJ 1 LUB TSHUAJ 2 SGUG LORENZA LAWSONU LUB PLAB.   Sent to pharmacy as: famotidine (PEPCID) 40 MG tablet     Kellie Lott RN [facility] Triage/Medication Refill

## 2021-06-25 NOTE — PROGRESS NOTES
ASSESMENT AND PLAN:  Diagnoses and all orders for this visit:    Type 2 diabetes mellitus treated without insulin (H)  -     Glycosylated Hemoglobin A1c  A1c 6.3 today.  Continue metformin 500 mg twice a day.  Normal creatinine on 1/11/2019.    Hyperlipidemia, unspecified hyperlipidemia type  -     Lipid Cascade  Continue current medication.    Moderate persistent asthma without complication  Managed by pulmonology.    Coronary artery disease involving native coronary artery of native heart without angina pectoris  Managed by cardiology.    SVT (supraventricular tachycardia) (H)  Managed by cardiology.    Gastroesophageal reflux disease without esophagitis   Refilled omeprazole.    Constipation  Refilled med    This transcription uses voice recognition software, which may contain typographical errors.      SUBJECTIVE: Kulwant Amin is a 49-year-old female with history of diabetes, hyperlipidemia, asthma, coronary artery disease as/P stent placement and history of SVT here for follow-up.    She was recently seen by cardiology on 2/26/2019.  Note reviewed, no med changes were made.    She saw pulmonology on 2/28/2019.  Note reviewed.  Instructed to take Dulera twice a day not as needed and continue albuterol as needed.  She was given prednisone to take 40 mg daily for 5 days.  Daughter-in-law states her blood sugars were okay while she was on prednisone.  Her blood sugar readings in the past 2 weeks were in the 120s-130s.  The highest was 149.  No low blood sugar with hypoglycemic symptoms reported.  States her breathing is improving.  She denied acute cough, shortness of breath or wheezing today.    She has home health nurse helping her with her medications.      Past Medical History:   Diagnosis Date     Acute blood loss anemia      Anxiety      Arthritis      Asthma      Chest wall pain 12/26/2017     COPD (chronic obstructive pulmonary disease) (H)      Depression      Diabetes mellitus (H)      Generalized  "headaches      History of anesthesia complications     N/V     HTN (hypertension)      Lactic acid acidosis 2018     Pneumonia      SVT (supraventricular tachycardia) (H)      Unstable angina (H) 10/5/2018     Patient Active Problem List   Diagnosis     HTN (hypertension)     Simple chronic bronchitis (H)     GERD (gastroesophageal reflux disease)     SVT (supraventricular tachycardia) (H)     CAD (coronary artery disease)     Type 2 diabetes mellitus treated without insulin (H)     S/P coronary artery stent placement     Moderate persistent asthma     Migraine headache     Cataract     Hyperlipidemia     Rectal bleed     GI bleeding     Lower GI bleeding     Rectal bleeding       Allergies:  No Known Allergies    Social History     Tobacco Use   Smoking Status Former Smoker     Last attempt to quit:      Years since quittin.2   Smokeless Tobacco Never Used       Review of systems otherwise negative except as listed in HPI.   Social History     Tobacco Use   Smoking Status Former Smoker     Last attempt to quit:      Years since quittin.2   Smokeless Tobacco Never Used       OBJECTICE: /70 (Patient Site: Right Arm, Patient Position: Sitting, Cuff Size: Adult Regular)   Pulse 80   Temp 98.1  F (36.7  C) (Oral)   Resp 20   Ht 5' 4\" (1.626 m)   Wt 128 lb 7 oz (58.3 kg)   SpO2 98%   BMI 22.05 kg/m      DATA REVIEWED:  Additional History from Old Records Summarized (2):   Labs Reviewed or Ordered (1):       GEN-alert,  in no apparent distress.  HEENT-mucous membranes are moist, neck is supple.  CV-regular rate and rhythm with no murmur.   RESP-lungs clear to auscultation .  ABDOMEN- Soft , not tender.  EXTREM- No edema.  NEURO- No focal findings.   SKIN-normal        Adrien Cardoza   3/14/2019   "

## 2021-06-26 NOTE — PROGRESS NOTES
Hospital Follow-up Visit:    Assessment/Plan:     1. Hospital discharge follow-up  Was seen for dizziness.  All work-ups unremarkable.    2. Dizziness  Improving.  Continue meclizine 25 mg 3 times a day as needed.    3. Bilateral low back pain with bilateral sciatica, unspecified chronicity  Increase the gabapentin to 300 mg 3 times daily from twice daily.  Will consider MRI  - XR Lumbar Spine 2 or 3 VWS    4. Pruritus  Started after injections at allergy clinic per patient..  Follow-up appointment scheduled on 6/8/2021.  Instructed to keep that appointment.       Subjective:     Kulwant Amin is a 51 y.o. female who presents for a hospital discharge follow up.  She was admitted overnight on 5/27/2021 due to dizziness.  EKG, head CT, head CT, neck CTA and labs are all unremarkable.  Neurology was consulted.  No med changes were made.  She was recommended to continue meclizine.  Aspirin was decreased to 81 mg from 325 mg.  Patient states the dizziness is improving, about 80% improved.  She is complaining of bilateral lower back pain, radiates down to the legs.  Back pain started after she received injection from allergy clinic per daughter-in-law.  Injections were on 4/13/2021 and 5/13/2021.  Daughter-in-law and patient also reports itchiness on her thigh after allergy injection.  Daughter-in-law states she called allergy clinic and was not able to get into see the physician there.  They have appointment at allergy clinic will but  Guarded scheduled on 6/8/2021 per chart review.      Hospital/Nursing Home/IP Rehab Facility: [facility]  Date of Admission: 5/27/2021  Date of Discharge:5/28/2021  Reason(s) for Admission:Dizziness            Do you have any problems taking your medication regularly?  None       Have you had any changes in your medication since discharge? None       Have you had any difficulty following your discharge or treatment plan?  No    Summary of hospitalization:  Hospital discharge  "summary reviewed  Diagnostic Tests/Treatments reviewed.  Follow up needed: None  Update since discharge: {Improving   Information from family, SNF, care coordination: Daughter in law reported most questions     Post Discharge Medication Reconciliation: discharge medications reconciled, continue medications without change  Plan of care communicated with: patient and daughter in law    Objective:     Vitals:    06/04/21 0856   BP: 108/66   Pulse: 88   Resp: 16   Temp: 98.3  F (36.8  C)   TempSrc: Oral   SpO2: 96%   Weight: 126 lb 8 oz (57.4 kg)   Height: 5' 0.5\" (1.537 m)         Physical Exam:  Gen - alert, orientated, NAD  Eyes - fundascopic exam limited by the undialated pupil but looks symmetric  ENT - oropharynx clear, TMs clear  Neck - supple  CV - RRR, no murmur  Resp - No significant wheezing today. No crackles.  Ab - soft, nontender, no palpable mass or organomegaly  BACK- Mild tenderness bilateral lower lumbar paraspinal area.  Negative straight leg raising test.  Extrem - warm, no edema  Neuro - CN II-XII intact, strength, sensation, reflexes intact and symmetric  Skin - no rash.  No atypical appearing lesions seen.         Coding guidelines for this visit:  Type of Medical   Decision Making Face-to-Face Visit       within 7 Days of discharge Face-to-Face Visit        within 14 days of discharge   Moderate Complexity [id number]   High Complexity [id number]       Electronically signed by Adrien Cardoza MD 06/04/21 8:59 AM     "

## 2021-06-26 NOTE — PROGRESS NOTES
Progress Notes by Del Hirsch MD at 6/20/2018 12:50 PM     Author: Del Hirsch MD Service: -- Author Type: Physician    Filed: 6/20/2018  1:14 PM Encounter Date: 6/20/2018 Status: Signed    : Del Hirsch MD (Physician)                  [email or web address]  [phone]         Thank you Dr. Cardoza for asking the [facility] team to participate in the care of your patient, Kulwant Amin.     Impression and Plan     1. Coronary artery disease. Kulwant has known coronary artery disease. Specifically, patient underwent coronary angiography with PCI with stent placement to proximal-mid left anterior descending coronary artery (2.75×32 and 4.0×8 mm Synergy drug-eluting stents).  ?  Patient underwent nuclear perfusion imaging on March 2018 at [facility] which revealed no evidence of infarct or ischemia.  ?  This is stable. Patient reports no concerning angina type chest pain at this time.  ?  1. ?SVT. ?Patient at time of initial consultation had been reported to have an SVT with heart rate of approximately 200-220?bpm per documentation available (rhythm strips were reviewed and located in physical chart). Patient apparently responded to adenosine. 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 Differential, however, would include Gustavo-Parkinson-White with concealed accessory pathway (no delta waves seen on ECG) with orthodromic reciprocating tachycardia (ORT). Patient was started on metoprolol therapy.  She reports no significant recurrent palpitations.  Follow-up event recorder 20 March 2018 through 17 April 2018 revealed no recurrent rhythm disturbances.  ?  3. ?Hypertension. Blood pressure is quite reasonable to swearing at 106/64 mmHg.  ?  4. Dyslipidemia.     Continue atorvastatin.    Will obtain direct LDL today.           History of Present Illness    Once again I would like to thank you again for  asking me to participate in the care of your patient, Kulwant Amin.  As you know, but to reiterate for my own records, Kulwant Amin is a 48 y.o. female with known coronary artery disease. Specifically, patient underwent coronary angiography with PCI with stent placement to proximal-mid left anterior descending coronary artery (2.75×32 and 4.0×8 mm Synergy drug-eluting stents).  ?  Patient underwent nuclear perfusion imaging 1 March 2018 at [facility] which revealed no evidence of infarct or ischemia.  ?  On interview today, patient states that she has been doing well from a cardiovascular standpoint. She minimizes any angina type chest pain at this time. She denies any significant lightheadedness. She states her breathing is comfortable.  She reports no recurrent subjective palpitations.    Further review of systems is otherwise negative/noncontributory (medical record and 13 point review of systems reviewed as well and pertinent positives noted).         Cardiac Diagnostics      Echocardiogram 27 December 2017:  1. Normal left ventricular size and systolic performance with ejection fraction 65-70%.  2. No significant valvular heart disease.  3. Normal right ventricular size and systolic performance  4. Normal atrial dimensions.  ?  Echocardiogram 7 July 2014:  1. Normal left ventricular size and systolic performance with ejection fraction of 55-60%.  2. No significant valvular heart disease.  3. Normal right ventricular size and systolic performance.  4. Normal atrial dimensions.  ?  Nuclear perfusion imaging 1 March 2018:  1. No evidence of infarct or ischemia.  2. Normal left ventricular systolic performance with ejection fraction greater than 75%.  ?  Nuclear perfusion imaging 28 December 2017 (pre-coronary angiogram):  1. No evidence of infarct or ischemia.  2. Increased stress to rest E ratio 1.46 possibly representing multivessel disease.  3. Normal left ventricular systolic performance with ejection  "fraction of 75%.  ?  Coronary angiogram 25 January 2018:  1. Left Main coronary artery: Normal.  2. Left anterior descending coronary artery: Proximal-mid 70% stenosis at diagonal bifurcation with fractional flow reserve of 0.80. 25 January 2018 with  3. Circumflex coronary artery: Mild disease.  4. Right coronary artery: Normal.  5. Status post PCI with stent placement to proximal-mid left anterior descending coronary artery (2.75×32 and 4.0×8 mm Synergy drug-eluting stents).  ?  30 day event recorder 20 March 2018 through 17 April 2018:  1. Normal 24-hour Holter monitor.  Symptoms correlating with sinus rhythm.  ?  Twelve-lead ECG (personally reviewed) 27 December 2017: Sinus rhythm. ?Normal ECG.           Physical Examination       /64 (Patient Site: Right Arm, Patient Position: Sitting, Cuff Size: Adult Regular)  Pulse 76  Resp 16  Ht 5' 0.5\" (1.537 m)  Wt 127 lb (57.6 kg)  BMI 24.39 kg/m2        Wt Readings from Last 3 Encounters:   06/20/18 127 lb (57.6 kg)   06/14/18 127 lb 2 oz (57.7 kg)   06/13/18 127 lb (57.6 kg)     The patient is alert and oriented times three. Sclerae are anicteric. Mucosal membranes are moist. Jugular venous pressure is normal. No significant adenopathy/thyromegally appreciated. Lungs are clear with good expansion. On cardiovascular exam, the patient has a regular S1 and S2. Abdomen is soft and non-tender. Extremities reveal no clubbing, cyanosis, or edema.         Family History/Social History/Risk Factors   Patient does not smoke.  Family history of hypertension.         Medications  Allergies   Current Outpatient Prescriptions   Medication Sig Dispense Refill   ? acetaminophen (TYLENOL) 500 MG tablet Take 500 mg by mouth every 6 (six) hours as needed for pain.     ? albuterol (PROAIR HFA;PROVENTIL HFA;VENTOLIN HFA) 90 mcg/actuation inhaler Inhale 2 puffs every 6 (six) hours as needed for wheezing. 1 Inhaler 2   ? albuterol (PROVENTIL) 2.5 mg /3 mL (0.083 %) nebulizer " solution Take 3 mL (2.5 mg total) by nebulization every 6 (six) hours as needed for wheezing. 75 mL 12   ? amitriptyline (ELAVIL) 10 MG tablet TAKE 1 TABLET (10 MG TOTAL) BY MOUTH AT BEDTIME. 30 tablet 5   ? aspirin 81 MG EC tablet Take 1 tablet (81 mg total) by mouth daily. 90 tablet 1   ? atorvastatin (LIPITOR) 80 MG tablet Take 1 tablet (80 mg total) by mouth at bedtime. 90 tablet 3   ? blood glucose meter (GLUCOMETER) Use 1 each As Directed 3 (three) times a day. Dispense glucometer brand per patient's insurance at pharmacy discretion.(E11.9) 1 each 0   ? blood glucose test strips Use 1 each As Directed 3 (three) times a day. Dispense brand per patient's insurance at pharmacy discretion.(E11.9) 300 strip 3   ? clopidogrel (PLAVIX) 75 mg tablet Take 1 tablet (75 mg total) by mouth daily. 90 tablet 1   ? generic lancets Use 1 each As Directed 3 (three) times a day. Dispense brand per patient's insurance at pharmacy discretion.(E11.9) 300 each 3   ? guaiFENesin (ROBITUSSIN) 100 mg/5 mL syrup Take 10 mL (200 mg total) by mouth 3 (three) times a day as needed for cough. 200 mL 2   ? hydroCHLOROthiazide (MICROZIDE) 12.5 mg capsule TAKE 1 CAPSULE (12.5 MG TOTAL) BY MOUTH DAILY. 90 capsule 1   ? metFORMIN (GLUCOPHAGE) 500 MG tablet Take 1 tablet (500 mg total) by mouth 2 (two) times a day with meals. 60 tablet 5   ? metoprolol tartrate (LOPRESSOR) 25 MG tablet Take 1 tablet (25 mg total) by mouth 2 (two) times a day. 180 tablet 1   ? mometasone-formoterol (DULERA) 200-5 mcg/actuation HFAA inhaler Inhale 2 puffs 2 (two) times a day.     ? montelukast (SINGULAIR) 10 mg tablet TAKE 1 PILL (10 MG TOTAL) BY MOUTH AT BEDTIME FOR ASTHMA 30 tablet 5   ? omeprazole (PRILOSEC) 20 MG capsule Take 1 capsule (20 mg total) by mouth daily. 90 capsule 1   ? predniSONE (DELTASONE) 20 MG tablet Take 1 tablet (20 mg total) by mouth daily for 7 days. 7 tablet 2   ? sucralfate (CARAFATE) 1 gram tablet TAKE 1 PILL (1 G TOTAL) BY MOUTH 4  (FOUR) TIMES A DAY. 120 tablet 11   ? traMADol (ULTRAM) 50 mg tablet Take 1 tablet (50 mg total) by mouth every 8 (eight) hours as needed for pain. 30 tablet 0   ? umeclidinium-vilanterol (ANORO ELLIPTA) 62.5-25 mcg/actuation inhaler Inhale 1 puff daily. 60 each 2   ? benzonatate (TESSALON) 100 MG capsule Take 1 capsule (100 mg total) by mouth every 8 (eight) hours. 21 capsule 0   ? polyethylene glycol (MIRALAX) 17 gram packet Take 1 packet (17 g total) by mouth daily as needed.  0   ? senna-docusate (PERICOLACE) 8.6-50 mg tablet Take 1 tablet by mouth 2 (two) times a day.  0     No current facility-administered medications for this visit.       No Known Allergies       Lab Results   Lab Results   Component Value Date     05/23/2018    K 3.9 05/23/2018     05/23/2018    CO2 23 05/23/2018    BUN 8 05/23/2018    CREATININE 0.69 05/23/2018    CALCIUM 9.5 05/23/2018     Lab Results   Component Value Date    WBC 8.7 05/23/2018    HGB 11.5 (L) 05/23/2018    HCT 36.3 05/23/2018    MCV 79 (L) 05/23/2018     05/23/2018     Lab Results   Component Value Date    CHOL 197 01/25/2018    TRIG 139 01/25/2018    HDL 48 (L) 01/25/2018    LDLCALC 121 01/25/2018    LDLDIRECT 129 03/19/2018     Lab Results   Component Value Date    INR 1.02 01/29/2018     Lab Results   Component Value Date    BNP 26 05/23/2018     Lab Results   Component Value Date    TROPONINI 0.01 05/23/2018    TROPONINI <0.01 01/30/2018    TROPONINI <0.01 01/30/2018     Lab Results   Component Value Date    TSH 0.17 (L) 12/28/2017

## 2021-06-26 NOTE — PROGRESS NOTES
Progress Notes by Del Hirsch MD at 3/19/2018  9:10 AM     Author: Del Hirsch MD Service: -- Author Type: Physician    Filed: 3/20/2018  8:07 AM Encounter Date: 3/19/2018 Status: Signed    : Del Hirsch MD (Physician)                  [email or web address]  [phone]         Thank you Dr. Cardoza for asking the [facility] team to participate in the care of your patient, Kulwant Amin.     Impression and Plan     1.  Coronary artery disease. Kulwant has known coronary artery disease. Specifically, patient underwent coronary angiography with PCI with stent placement to proximal-mid left anterior descending coronary artery (2.75×32 and 4.0×8 mm Synergy drug-eluting stents).    Patient underwent nuclear perfusion imaging on March 2018 at [facility] which revealed no evidence of infarct or ischemia.    This is stable.  Patient reports no concerning angina type chest pain at this time.    1.  SVT.  Patient at time of initial consultation had been reported to have an SVT with heart rate of approximately 200-220 bpm per documentation available (rhythm strips were reviewed and located in physical chart).  Patient apparently responded to adenosine.  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  Differential, however, would include Gustavo-Parkinson-White with concealed accessory pathway (no delta waves seen on ECG) with orthodromic reciprocating tachycardia (ORT).  Patient was started on metoprolol therapy.  She does report brief recurrent subjective episodes on average once weekly.  Plan:    1 month one patch monitor to further clarify if having recurrent SVT.    Continue metoprolol.     3.  Hypertension.  Blood pressure is quite reasonable to swearing at 112/72 mmHg.    4.  Dyslipidemia.  Patient did report some right shoulder discomfort and was advised by another provider to transiently defer statin  therapy.  Patient does feel there has been some improvement.  At this time is jointly decided to resume the therapy to see if she has any recurrent symptoms.  In the meantime, we will obtain a new baseline LDL.  Patient instructed to call should she have recurrent concerns on the statin.         History of Present Illness    Once again I would like to thank you again for asking me to participate in the care of your patient, Kulwant Amin.  As you know, but to reiterate for my own records, Kulwant Amin is a 48 y.o. female with known coronary artery disease.  Specifically, patient underwent coronary angiography with PCI with stent placement to proximal-mid left anterior descending coronary artery (2.75×32 and 4.0×8 mm Synergy drug-eluting stents).    Patient underwent nuclear perfusion imaging on March 2018 at [facility] which revealed no evidence of infarct or ischemia.    On interview today, patient states that she has been doing well from a cardiovascular standpoint.  She minimizes any angina type chest pain at this time.  She does state,, that she has noted some intermittent recurrent episodes of subjective faster heart rate though these are fairly short-lived.  She states on average she will have one episode weekly.  She denies any significant lightheadedness.  She states her breathing is comfortable.    Further review of systems is otherwise negative/noncontributory (medical record and 13 point review of systems reviewed as well and pertinent positives noted).         Cardiac Diagnostics      Echocardiogram 27 December 2017:  1. Normal left ventricular size and systolic performance with ejection fraction 65-70%.  2. No significant valvular heart disease.  3. Normal right ventricular size and systolic performance  4. Normal atrial dimensions.    Echocardiogram 7 July 2014:  1. Normal left ventricular size and systolic performance with ejection fraction of 55-60%.   2. No significant valvular heart  "disease.  3. Normal right ventricular size and systolic performance.  4. Normal atrial dimensions.    Nuclear perfusion imaging 1 March 2018:  1. No evidence of infarct or ischemia.  2. Normal left ventricular systolic performance with ejection fraction greater than 75%.    Nuclear perfusion imaging 28 December 2017 (pre-coronary angiogram):  1. No evidence of infarct or ischemia.  2. Increased stress to rest E ratio 1.46 possibly representing multivessel disease.  3. Normal left ventricular systolic performance with ejection fraction of 75%.    Coronary angiogram 25 January 2018:  1. Left Main coronary artery: Normal.  2. Left anterior descending coronary artery: Proximal-mid 70% stenosis at diagonal bifurcation with fractional flow reserve of 0.80. 25 January 2018 with  3. Circumflex coronary artery: Mild disease.  4. Right coronary artery: Normal.  5. Status post PCI with stent placement to proximal-mid left anterior descending coronary artery (2.75×32 and 4.0×8 mm Synergy drug-eluting stents).    Twelve-lead ECG (personally reviewed) 27 December 2017: Sinus rhythm.  Normal ECG.         Physical Examination       /72 (Patient Site: Right Arm, Patient Position: Sitting, Cuff Size: Adult Regular)  Pulse 82  Resp 18  Ht 5' 3\" (1.6 m)  Wt 131 lb (59.4 kg)  BMI 23.21 kg/m2        Wt Readings from Last 3 Encounters:   03/19/18 131 lb (59.4 kg)   03/14/18 129 lb (58.5 kg)   03/12/18 128 lb (58.1 kg)     The patient is alert and oriented times three. Sclerae are anicteric. Mucosal membranes are moist. Jugular venous pressure is normal. No significant adenopathy/thyromegally appreciated. Lungs are clear with good expansion. On cardiovascular exam, the patient has a regular S1 and S2. Abdomen is soft and non-tender. Extremities reveal no clubbing, cyanosis, or edema.           Family History/Social History/Risk Factors   Patient does not smoke.  Family history of hypertension.         Medications  Allergies "   Current Outpatient Prescriptions   Medication Sig Dispense Refill   ? acetaminophen (TYLENOL) 500 MG tablet Take 500 mg by mouth every 6 (six) hours as needed for pain.     ? albuterol (PROAIR HFA;PROVENTIL HFA;VENTOLIN HFA) 90 mcg/actuation inhaler Inhale 2 puffs every 6 (six) hours as needed for wheezing. 1 Inhaler 2   ? albuterol (PROVENTIL) 2.5 mg /3 mL (0.083 %) nebulizer solution Take 3 mL (2.5 mg total) by nebulization every 6 (six) hours as needed for wheezing. 75 mL 12   ? amitriptyline (ELAVIL) 10 MG tablet Take 1 tablet (10 mg total) by mouth at bedtime. 30 tablet 5   ? aspirin 81 MG EC tablet Take 1 tablet (81 mg total) by mouth daily. 90 tablet 1   ? blood glucose meter (GLUCOMETER) Use 1 each As Directed 3 (three) times a day. Dispense glucometer brand per patient's insurance at pharmacy discretion.(E11.9) 1 each 0   ? blood glucose test strips Use 1 each As Directed 3 (three) times a day. Dispense brand per patient's insurance at pharmacy discretion.(E11.9) 300 strip 3   ? clopidogrel (PLAVIX) 75 mg tablet Take 1 tablet (75 mg total) by mouth daily. 90 tablet 1   ? generic lancets Use 1 each As Directed 3 (three) times a day. Dispense brand per patient's insurance at pharmacy discretion.(E11.9) 300 each 3   ? guaiFENesin (ROBITUSSIN) 100 mg/5 mL syrup Take 10 mL (200 mg total) by mouth 3 (three) times a day as needed for cough. 200 mL 0   ? metoprolol tartrate (LOPRESSOR) 25 MG tablet Take 1 tablet (25 mg total) by mouth 2 (two) times a day. 180 tablet 1   ? mometasone-formoterol (DULERA) 200-5 mcg/actuation HFAA inhaler Inhale 2 puffs 2 (two) times a day.     ? montelukast (SINGULAIR) 10 mg tablet Take 10 mg by mouth at bedtime.     ? omeprazole (PRILOSEC) 20 MG capsule Take 1 capsule (20 mg total) by mouth daily. 90 capsule 1   ? polyethylene glycol (MIRALAX) 17 gram packet Take 1 packet (17 g total) by mouth daily as needed.  0   ? predniSONE (DELTASONE) 20 MG tablet Take 3 tabs daily for 7 days,  "then two tabs daily for 7 days , then one tab daily for 7 days 42 tablet 0   ? senna-docusate (PERICOLACE) 8.6-50 mg tablet Take 1 tablet by mouth 2 (two) times a day.  0   ? sucralfate (CARAFATE) 1 gram tablet Take 1 tablet (1 g total) by mouth 4 (four) times a day. 120 tablet 0   ? tiotropium bromide (SPIRIVA RESPIMAT) 2.5 mcg/actuation Mist Inhale 2 puffs daily. 4 g 4   ? traMADol (ULTRAM) 50 mg tablet Take 1 tablet (50 mg total) by mouth every 8 (eight) hours as needed for pain. 30 tablet 0   ? umeclidinium-vilanterol (ANORO ELLIPTA) 62.5-25 mcg/actuation inhaler Inhale 1 puff daily. 1 Inhaler 2   ? atorvastatin (LIPITOR) 80 MG tablet Take 1 tablet (80 mg total) by mouth at bedtime. (Patient taking differently: Take 80 mg by mouth at bedtime. Pt reports Lipitor is on hold for 2 weeks \"due to shoulder discomfort\") 30 tablet 1     No current facility-administered medications for this visit.       No Known Allergies       Lab Results   Lab Results   Component Value Date     01/29/2018    K 3.5 01/29/2018     01/29/2018    CO2 21 (L) 01/29/2018    BUN 7 (L) 01/29/2018    CREATININE 0.69 01/29/2018    CALCIUM 10.0 01/29/2018     Lab Results   Component Value Date    WBC 10.2 02/02/2018    HGB 11.4 (L) 02/02/2018    HCT 36.1 02/02/2018    MCV 81 02/02/2018     02/02/2018     Lab Results   Component Value Date    CHOL 197 01/25/2018    TRIG 139 01/25/2018    HDL 48 (L) 01/25/2018    LDLCALC 121 01/25/2018     Lab Results   Component Value Date    INR 1.02 01/29/2018     Lab Results   Component Value Date    BNP 26 12/11/2015     Lab Results   Component Value Date    TROPONINI <0.01 01/30/2018    TROPONINI <0.01 01/30/2018    TROPONINI <0.01 01/29/2018     Lab Results   Component Value Date    TSH 0.17 (L) 12/28/2017                                 "

## 2021-06-26 NOTE — PATIENT INSTRUCTIONS - HE
This would be unusual for Fasenra to cause these side effects    Next step, could consider Dupixent    I will talk with pulmonary

## 2021-06-26 NOTE — PROGRESS NOTES
Progress Notes by Lindsey Crespo PA-C at 2/8/2018  8:30 AM     Author: Lindsey Crespo PA-C Service: -- Author Type: Physician Assistant    Filed: 2/8/2018 10:10 AM Encounter Date: 2/8/2018 Status: Signed    : Lindsey Crespo PA-C (Physician Assistant)           Click to link to [facility] HEART [facility] NOTE      Assessment/Recommendations   1.  Coronary artery disease - s/p 2 VINICIO on January 25 to proximal to mid LAD:  Kulwant Amni will continue on dual antiplatelet therapy with aspirin 81 mg indefinitely and clopidogrel 75 mg for 1 year.  We discussed the importance of antiplatelet therapy and talking with her cardiologist prior to stopping these medications for any reason.  Puncture site is soft, nontender and healing well.  No evidence of residual hematoma.      Isosorbide was recommended by interventional cardiologist for 1 month postprocedure, however she did not receive this medication upon discharge.  Since blood pressure is a bit on the low side and she is already having occasional dizziness, will not start isosorbide at this time.    Risk factor modification and lifestyle management topics were discussed including managing comorbidities, weight loss, heart healthy diet and exercise.  Cardiac rehab has been ordered.      2.  Dyslipidemia: Kulwant Amin is currently on high intensity statin therapy with atorvastatin 80 mg daily.  However, since she has begun this medication, she complains of right shoulder and arm pain with weakness to the point where she drops things.  Again, hard to accurately assess these symptoms b/c of language barrier.  Could be statin induced and I have asked her to hold atorvastatin for 2 weeks to see if symptoms resolve.  We discussed a diet low in saturated fat, weight loss, and exercise along with medication for better control of cholesterol.   Her most recent lipid profile showed .      3.  Hypertension: Her blood  pressure is well controlled today taking metoprolol tartrate 25 mg twice daily.    4.  Diabetes:  We discussed the importance of tightly controlled blood sugars to minimize risk of worsening coronary artery disease.  Primary care provider to manage diabetes.    5. COPD: on Spiriva, Dulera and Albuterol prn.  Breathing has improved slightly since PCI.         She will see Dr. Hirsch on March 19th     History of Present Illness    Kulwant Amin is seen at [facility] for post coronary intervention follow up.  This is a 48-year-old [place] female.  She comes in with her daughter and the  today.  Information is gathered from both the patient and her daughter, through the .  Despite this, the patient's symptoms and complaints were difficult to assess.      She has a history of abdominal burning and epigastric pain for approximately 2-3 years.  She also has history of hypertension, hyperlipidemia, COPD, gastric esophageal reflux disease, SVT and diabetes mellitus.  In late December, she was admitted with palpitations/chest pain and continued abdominal pain and had an abnormal nuclear stress test.  However, serial trops were negative and EKG showed no significant changes.  She was sent home with recommendations to have outpatient CTA with calcium score, EGD and consults with both EP and GI.     Before she had any further workup, she was admitted again on January 6.  Again, serial trops were negative.      And then started having chest pain in early January.  She was admitted on January 6 and troponins were negative ×2 and she was sent home.     She had her CTA with calcium score on January 11 and this was suggestive of proximal LAD disease.  Therefore a coronary angiogram was scheduled.  She came in on January 25 for angiogram.  The left radial artery was accessed, but because it was too small, the right femoral artery was used.  This revealed a normal left main, mild disease in the  circumflex and a normal RCA.  There was however a 70% proximal to mid lesion in the LAD and this was treated with 2 drug-eluting stents.  She was started on dual antiplatelet therapy with aspirin and Plavix.  She complained of back and groin pain and CT confirmed a retroperitoneal hematoma.  US showed no evidence of pseudoaneurysm.  She was transfused and then sent home after Hgb and pain were stable.      Unfortunately she came back 2 days later with uncontrolled abdominal pain and back pain.  Repeat CT scan showed evolving retroperitoneal hematoma that appeared to be decreasing with no extravasation.  She was sent home.    Today, she states that her breathing is a bit better since the stents were placed.  She states that since she was started on a new medication, she has been having shoulder and right arm pain with weakness.  She states that when she holds onto a glass of water or even a spoon, she has a tendency to drop them.  She complains of occasional chest pain, abdominal burning and occasional dizziness.  She is taking her aspirin and Plavix, but looks like she never received the isosorbide from the hospitalization.  She is starting cardiac rehab today.  She still has not scheduled her outpatient EGD with GI.    She otherwise denies fatigue, dyspnea on exertion, orthopnea, PND, palpitations and lower extremity edema.      Review of Systems:   12 point review of systems was reviewed and was negative except for those noted in the HPI    ECHO (personnaly reviewed): Done on January 25 showing EF 60%     Physical Examination Review of Systems   Vitals:    02/08/18 0852   BP: 112/70   Pulse: 62   Resp: 18     Body mass index is 22.89 kg/(m^2).  Wt Readings from Last 3 Encounters:   02/08/18 129 lb 3.2 oz (58.6 kg)   02/02/18 129 lb 2 oz (58.6 kg)   01/30/18 125 lb 9.6 oz (57 kg)       General Appearance:   no distress, normal body habitus   ENT/Mouth: membranes moist, no oral lesions or bleeding gums.      EYES:   no scleral icterus, normal conjunctivae   Neck: no carotid bruits or thyromegaly   Chest/Lungs:   lungs are clear to auscultation, no rales or wheezing   Cardiovascular:   Regular. Normal first and second heart sounds with no murmurs, rubs, or gallops; the carotid, radial and posterior tibial pulses are intact, no edema bilaterally    Abdomen:  no organomegaly, masses, bruits, or tenderness; bowel sounds are present   Extremities: no cyanosis or clubbing.  Puncture site at right groin and left wrist are healed   Skin: no xanthelasma, warm.    Neurologic: normal gait, normal  bilateral, no tremors     Psychiatric: alert and oriented x3, calm     General: Weight Loss  Eyes: Visual Distubance  Ears/Nose/Throat: Hearing Loss, Nosebleeds  Lungs: Cough, Shortness of Breath, Snoring, Wheezing  Heart: Arm Pain, Shortness of Breath with activity, Irregular Heartbeat  Stomach: Constipation, Heartburn, Nausea  Bladder: WNL  Muscle/Joints: Joint Pain, Muscle Pain  Skin: WNL  Nervous System: Dizziness, Loss of Balance, Falls  Mental Health: WNL     Blood: WNL     Medical History  Surgical History Family History Social History   Past Medical History:   Diagnosis Date   ? Anxiety    ? Arthritis    ? Asthma    ? Back pain    ? COPD (chronic obstructive pulmonary disease)    ? Depression    ? Diabetes mellitus    ? Generalized headaches    ? HTN (hypertension)    ? Pneumonia    ? SVT (supraventricular tachycardia)     Past Surgical History:   Procedure Laterality Date   ? CV CORONARY ANGIOGRAM N/A 1/25/2018    Procedure: Coronary Angiogram;  Surgeon: Angelo Serrano MD;  Location: [facility];  Service:     No family history on file. Social History     Social History   ? Marital status:      Spouse name: N/A   ? Number of children: N/A   ? Years of education: N/A     Occupational History   ? Not on file.     Social History Main Topics   ? Smoking status: Former Smoker     Quit date: 2003   ? Smokeless  tobacco: Never Used   ? Alcohol use No   ? Drug use: No   ? Sexual activity: Not on file     Other Topics Concern   ? Not on file     Social History Narrative    12/22/2017 The patient lives with her daughter-in-law (who is present), , son, and 2 grandchildren (total of 6 people).           Medications  Allergies   Current Outpatient Prescriptions   Medication Sig Dispense Refill   ? acetaminophen (TYLENOL) 500 MG tablet Take 500 mg by mouth every 6 (six) hours as needed for pain.     ? albuterol (PROAIR HFA;PROVENTIL HFA;VENTOLIN HFA) 90 mcg/actuation inhaler Inhale 2 puffs every 6 (six) hours as needed for wheezing. 1 Inhaler 2   ? albuterol (PROVENTIL) 2.5 mg /3 mL (0.083 %) nebulizer solution Take 3 mL (2.5 mg total) by nebulization every 6 (six) hours as needed for wheezing. 75 mL 12   ? amitriptyline (ELAVIL) 10 MG tablet Take 1 tablet (10 mg total) by mouth at bedtime. 30 tablet 5   ? aspirin 81 MG EC tablet Take 1 tablet (81 mg total) by mouth daily. 90 tablet 1   ? atorvastatin (LIPITOR) 80 MG tablet Take 1 tablet (80 mg total) by mouth at bedtime. 30 tablet 1   ? blood glucose test Strp Use to test blood sugar up to 3 times daily.  Contour Next EZ. 50 strip 1   ? clopidogrel (PLAVIX) 75 mg tablet Take 1 tablet (75 mg total) by mouth daily. 90 tablet 1   ? diphenhydrAMINE (BENADRYL) 25 mg tablet Take 1 tablet (25 mg total) by mouth bedtime as needed for sleep. 30 tablet 2   ? lancets (MICROLET LANCET) Misc Use to test blood sugars up to 3 times daily. 100 each 0   ? metoprolol tartrate (LOPRESSOR) 25 MG tablet Take 1 tablet (25 mg total) by mouth 2 (two) times a day. 180 tablet 1   ? mometasone-formoterol (DULERA) 200-5 mcg/actuation HFAA inhaler Inhale 2 puffs 2 (two) times a day.     ? montelukast (SINGULAIR) 10 mg tablet Take 10 mg by mouth at bedtime.     ? omeprazole (PRILOSEC) 20 MG capsule Take 1 capsule (20 mg total) by mouth daily. 90 capsule 1   ? oxyCODONE (ROXICODONE) 5 MG immediate  release tablet Take 1 tablet (5 mg total) by mouth every 6 (six) hours as needed. 15 tablet 0   ? polyethylene glycol (MIRALAX) 17 gram packet Take 1 packet (17 g total) by mouth daily as needed.  0   ? senna-docusate (PERICOLACE) 8.6-50 mg tablet Take 1 tablet by mouth 2 (two) times a day.  0   ? sucralfate (CARAFATE) 1 gram tablet Take 1 tablet (1 g total) by mouth 4 (four) times a day. 120 tablet 0   ? tiotropium bromide (SPIRIVA RESPIMAT) 2.5 mcg/actuation Mist Inhale 2 puffs daily. 4 g 4   ? traMADol (ULTRAM) 50 mg tablet Take 1 tablet (50 mg total) by mouth every 8 (eight) hours as needed for pain. 30 tablet 0     No current facility-administered medications for this visit.       No Known Allergies      Lab Results    Chemistry/lipid CBC Cardiac Enzymes/BNP/TSH/INR   Lab Results   Component Value Date    CHOL 197 01/25/2018    HDL 48 (L) 01/25/2018    LDLCALC 121 01/25/2018    TRIG 139 01/25/2018    CREATININE 0.69 01/29/2018    BUN 7 (L) 01/29/2018    K 3.5 01/29/2018     01/29/2018     01/29/2018    CO2 21 (L) 01/29/2018    Lab Results   Component Value Date    WBC 10.2 02/02/2018    HGB 11.4 (L) 02/02/2018    HCT 36.1 02/02/2018    MCV 81 02/02/2018     02/02/2018    Lab Results   Component Value Date    TROPONINI <0.01 01/30/2018    BNP 26 12/11/2015    TSH 0.17 (L) 12/28/2017    INR 1.02 01/29/2018          40 minutes were spent with the patient with greater than 50% spent on education and counseling.    Lindsey Caceres PA-C, MPAS  Structural Heart Program  [facility]

## 2021-06-26 NOTE — PROGRESS NOTES
Subjective   Kulwant Amin is 51 y.o. and presents today for the following health issues   HPI chief complaint: Asthma, question of side effects from Fasenra    History of present illness: This is a 51-year-old woman who has a history of severe persistent asthma here today for follow-up visit.  She also has eosinophilia.  She received her first Fasenra injection in April and received another 1 in May.  She reports 15 days after taking the first injections, she felt body aches and itching.  She was told by a doctor that this was likely due to the Fasenra.  She actually went to the emergency room and was hospitalized overnight with dizziness.  She is currently undergoing some work-up for the back pain.  They held the third dose of Fasenra but she continues to have the symptoms.  She states she feels itchy and would like an allergy medication for itching.  No rash.  In review of the primary care notes, it appears that she does have sciatica.  She is currently using meclizine for the dizziness she is experiencing.  Neurologic work-up was otherwise unremarkable.  She states she continues to have shortness of breath but her wheezing improved on the Fasenra.          Review of Systems  10 point review of Systems performed as above and the remainder is negative.  Denies any nausea vomiting, skin rash.  No headache.      Objective    Pulse 87   SpO2 96%   There is no height or weight on file to calculate BMI.  Physical Exam  Gen: Pleasant female not in acute distress  HEENT: Eyes no erythema of the bulbar or palpebral conjunctiva, no edema.   Skin: No rashes or lesions  Psych: Alert and oriented times 3        Impression report and plan:  1.  Severe persistent asthma  2.  Body aches  3.  Itching    Itching at the injection site is common after Fasenra but not whole body itching.  I do not think this is a side effect from Fasenra.  This is not described in the literature.  This is not listed as an adverse effect and  I have not personally seen this previously.  The patient is very hesitant to continue Fasenra, however.  Stated it takes about 6 months to see improvements on the start the medication.  Patient wanted an oral medication but there are none that would adequately control her asthma except for prednisone which is not a good long-term solution.  I talked with her about Dupixent.  Dupixent has relatively little side effects as well.  She somewhat hesitant to begin.  I told her I would contact her pulmonary doctor to determine if they are okay with this switch.  I think she needs to follow with her primary care clinic to discuss her back pain and body aches.  I will give her loratadine to try for the itching but again I do not think this is allergy based.  I do not think this is an adverse side effect from Fasenra.  Eosinophilia can cause itching, however.  Stated that we are trying to treat her eosinophilia with the Fasenra.    Time sent with the patient, communicating with other doctors, chart review documentation, 45 minutes.

## 2021-06-27 NOTE — PROGRESS NOTES
"Progress Notes by Del Hirsch MD at 7/9/2019 12:50 PM     Author: Del Hirsch MD Service: -- Author Type: Physician    Filed: 7/9/2019  1:16 PM Encounter Date: 7/9/2019 Status: Signed    : Del Hirsch MD (Physician)                  [email or web address]  [phone]         Thank you Dr. Cardoza for asking the [facility] team to participate in the care of your patient, Kulwant Amin.     Impression and Plan      1. Coronary artery disease. Kulwant has known coronary artery disease. Specifically, patient underwent coronary angiography with PCI with stent placement to proximal-mid left anterior descending coronary artery (2.75×32 and 4.0×8 mm Synergy drug-eluting stents).  ?  Patient underwent nuclear perfusion imaging 1 March 2018 at [facility] which revealed no evidence of infarct or ischemia.  ?  Patient today, however, reports that she has been having some exertional chest \"burning\".  She does feel as though it is worsened since nuclear perfusion imaging study in January.  She has had 3 nuclear perfusion studies over the last 2 years as well as a coronary angiogram.  We will plan to pursue regadenoson on MRI for further evaluation which will allow us to evaluate for ischemia without additional exposure to ionizing radiation.  Plan:    Regadenoson on MRI    1. SVT. Patient at time of initial consultation by me 27 December 2017 had been reported to have an SVT with heart rate of approximately 200-220 bpm. Patient apparently responded to adenosine. 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 Differential, however, would include Gustavo-Parkinson-White with concealed accessory pathway (no delta waves seen on ECG) with orthodromic reciprocating tachycardia (ORT). Patient was started on metoprolol at that time.  She reports no significant recurrent palpitations.  Follow-up event recorder 20 March 2018 " "through 17 April 2018 revealed no recurrent rhythm disturbances.  ?  3. Hypertension. Blood pressure is quite reasonable in the office today.  ?  4. Dyslipidemia.  Lipid profile 14 March 2019 was favorable with LDL 59 mg/dL and HDL 44 mg/dL.     Plan on follow-up in 1 year.         History of Present Illness    Once again I would like to thank you again for asking me to participate in the care of your patient, Kulwant Amin.  As you know, but to reiterate for my own records, Kulwant Amin is a 49 y.o. female with known coronary artery disease. Specifically, patient underwent coronary angiography with PCI with stent placement to proximal-mid left anterior descending coronary artery (2.75×32 and 4.0×8 mm Synergy drug-eluting stents).  ?  Patient most recently underwent a nuclear perfusion study 22 January 2019 which was favorable and revealed no evidence of infarct or ischemia with normal ejection fraction of 70%.  ?  On interview today, patient states that she has been noticing some chest \"burning\" with certain activities.  She does feel that it has worsened since January 2018.  She has perhaps some mild associated shortness of breath.  She denies any subjective palpitations or lightheadedness.    Further review of systems is otherwise negative/noncontributory (medical record and 13 point review of systems reviewed as well and pertinent positives noted).         Cardiac Diagnostics      Echocardiogram 23 May 2019:  1. Normal left ventricular size and systolic performance with ejection fraction of 65 to 70%.  2. No significant valvular heart disease.  3. Normal right ventricular size and systolic performance.  4. Normal atrial dimensions.     Echocardiogram 25 January 2018:  1. Limited echocardiogram.  2. Normal left ventricular size and systolic performance with ejection fraction of 60%.  3. No pericardial effusion.    Echocardiogram 27 December 2017:  1. Normal left ventricular size and systolic performance with " "ejection fraction 65-70%.  2. No significant valvular heart disease.  3. Normal right ventricular size and systolic performance  4. Normal atrial dimensions.    Echocardiogram 7 July 2014:  1. Normal left ventricular size and systolic performance with ejection fraction of 55-60%.  2. No significant valvular heart disease.  3. Normal right ventricular size and systolic performance.  4. Normal atrial dimensions.    Nuclear perfusion imaging study 22 January 2019:  1. No evidence of infarct or ischemia.  2. Normal left ventricular systolic performance with ejection fraction of 70%.    Nuclear perfusion imaging 1 March 2018:  1. No evidence of infarct or ischemia.  2. Normal left ventricular systolic performance with ejection fraction greater than 75%.    Nuclear perfusion imaging 28 December 2017 (pre-coronary angiogram):  1. No evidence of infarct or ischemia.  2. Increased stress to rest E ratio 1.46 possibly representing multivessel disease.  3. Normal left ventricular systolic performance with ejection fraction of 75%.    Coronary angiogram 25 January 2018:  1. Left Main coronary artery: Normal.  2. Left anterior descending coronary artery: Proximal-mid 70% stenosis at diagonal bifurcation with fractional flow reserve of 0.80. 25 January 2018 with  3. Circumflex coronary artery: Mild disease.  4. Right coronary artery: Normal.  5. Status post PCI with stent placement to proximal-mid left anterior descending coronary artery (2.75×32 and 4.0×8 mm Synergy drug-eluting stents).    Holter monitor 22 January 2019:  1. Normal Holter.    30 day event recorder 20 March 2018 through 17 April 2018:  1. Normal 24-hour Holter monitor.  2. Symptoms correlating with sinus rhythm.    Twelve-lead ECG (personally reviewed) 27 December 2017: Sinus rhythm. ?Normal ECG.          Physical Examination       /76 (Patient Site: Right Arm, Patient Position: Sitting, Cuff Size: Adult Regular)   Pulse 92   Resp 16   Ht 5' 1.2\" (1.554 " m)   Wt 127 lb (57.6 kg)   BMI 23.84 kg/m          Wt Readings from Last 3 Encounters:   07/09/19 127 lb (57.6 kg)   07/03/19 125 lb 8 oz (56.9 kg)   06/11/19 128 lb 9 oz (58.3 kg)       The patient is alert and oriented times three. Sclerae are anicteric. Mucosal membranes are moist. Jugular venous pressure is normal. No significant adenopathy/thyromegally appreciated. Lungs are clear with good expansion. On cardiovascular exam, the patient has a regular S1 and S2. Abdomen is soft and non-tender. Extremities reveal no clubbing, cyanosis, or edema.         Family History/Social History/Risk Factors   Patient does not smoke.  Family history of hypertension.         Medications  Allergies   Current Outpatient Medications   Medication Sig Dispense Refill   ? albuterol (PROVENTIL) 2.5 mg /3 mL (0.083 %) nebulizer solution Take 3 mL (2.5 mg total) by nebulization every 6 (six) hours as needed for wheezing. 150 mL 12   ? albuterol (VENTOLIN HFA) 90 mcg/actuation inhaler Inhale 2 puffs every 6 (six) hours as needed for wheezing. 1 Inhaler 3   ? amitriptyline (ELAVIL) 10 MG tablet TAKE 1 TABLET (10 MG TOTAL) BY MOUTH AT BEDTIME. 30 tablet 10   ? aspirin (ASPIR-LOW) 81 MG EC tablet Take 1 tablet (81 mg total) by mouth daily. 90 tablet 2   ? atorvastatin (LIPITOR) 80 MG tablet Take 1 tablet (80 mg total) by mouth at bedtime. 90 tablet 3   ? blood glucose meter (GLUCOMETER) Use 1 each As Directed 3 (three) times a day. Dispense glucometer brand per patient's insurance at pharmacy discretion.(E11.9) 1 each 0   ? blood glucose test strips Use 1 each As Directed 3 (three) times a day. Dispense brand per patient's insurance at pharmacy discretion.(E11.9) 300 strip 3   ? calcium carbonate (CALCIUM CARBONATE) 300 mg (750 mg) Chew Chew as needed.     ? clopidogrel (PLAVIX) 75 mg tablet Take 1 tablet (75 mg total) by mouth daily. 90 tablet 0   ? cyanocobalamin 1000 MCG tablet Take 1,000 mcg by mouth daily.            ? famotidine  (PEPCID) 40 MG tablet TAKE 1 PILL (40 MG) BY MOUTH TWICE DAILY FOR STOMACH 60 tablet 10   ? generic lancets Use 1 each As Directed 3 (three) times a day. Dispense brand per patient's insurance at pharmacy discretion.(E11.9) 300 each 3   ? hydroCHLOROthiazide (MICROZIDE) 12.5 mg capsule Take 1 capsule (12.5 mg total) by mouth daily. 90 capsule 2   ? insulin aspart U-100 (NOVOLOG) 100 unit/mL injection Inject under the skin daily. Use per sliding scale < 150 - No insulin, 151-200 use 3 U, 201- 250 use 5 U,   251- 300 use 8 U     ? MAPAP EXTRA STRENGTH 500 mg tablet TAKE 1 PILL BY MOUTH EVERY 6 HOURS AS NEEDED FOR PAIN 90 tablet 3   ? metFORMIN (GLUCOPHAGE) 500 MG tablet TAKE 1 TABLET (500 MG TOTAL) BY MOUTH 2 TIMES A DAY WITH MEALS FOR DIABETES 60 tablet 2   ? metoprolol tartrate (LOPRESSOR) 25 MG tablet Take 1 tablet (25 mg total) by mouth 2 (two) times a day. 180 tablet 1   ? mometasone-formoterol (DULERA) 200-5 mcg/actuation HFAA inhaler INHALE 2 PUFFS BY MOUTH TWO TIMES A DAY 26 g 11   ? montelukast (SINGULAIR) 10 mg tablet TAKE 1 PILL (10 MG TOTAL) BY MOUTH AT BEDTIME FOR ASTHMA 30 tablet 10   ? omeprazole (PRILOSEC) 20 MG capsule Take 1 capsule (20 mg total) by mouth daily before breakfast. 90 capsule 1   ? ONETOUCH ULTRA BLUE TEST STRIP strips USE TO TEST BLOOD SUGAR UP TO 3 TIMES DAILY. 300 strip 6   ? polyethylene glycol (MIRALAX) 17 gram packet Take 1 packet (17 g total) by mouth daily. 30 packet 1   ? doxylamine (UNISOM, DOXYLAMINE,) 25 mg tablet Take 1 tablet (25 mg total) by mouth at bedtime as needed for sleep. 30 tablet 1   ? umeclidinium (INCRUSE ELLIPTA) 62.5 mcg/actuation DsDv inhaler Inhale 1 puff daily. 1 each 11     No current facility-administered medications for this visit.       No Known Allergies       Lab Results   Lab Results   Component Value Date     06/23/2019    K 3.4 (L) 06/23/2019     06/23/2019    CO2 26 06/23/2019    BUN 12 06/23/2019    CREATININE 0.73 06/23/2019     CALCIUM 9.4 06/23/2019     Lab Results   Component Value Date    WBC 7.2 06/23/2019    HGB 10.4 (L) 06/23/2019    HCT 34.7 (L) 06/23/2019    MCV 77 (L) 06/23/2019     06/23/2019     Lab Results   Component Value Date    CHOL 133 03/14/2019    TRIG 152 (H) 03/14/2019    HDL 44 (L) 03/14/2019    LDLCALC 59 03/14/2019    LDLDIRECT 57 06/20/2018     Lab Results   Component Value Date    INR 1.01 05/22/2019     Lab Results   Component Value Date    BNP 12 06/23/2019     Lab Results   Component Value Date    TROPONINI <0.01 05/23/2019    TROPONINI <0.01 05/22/2019    TROPONINI <0.01 05/22/2019     Lab Results   Component Value Date    TSH 1.10 10/05/2018

## 2021-06-27 NOTE — PROGRESS NOTES
Progress Notes by Del Hirsch MD at 2/26/2019  9:10 AM     Author: Del Hirsch MD Service: -- Author Type: Physician    Filed: 2/26/2019  9:43 AM Encounter Date: 2/26/2019 Status: Signed    : Del Hirsch MD (Physician)                  [email or web address]  [phone]         Thank you Dr. Cardoza for asking the [facility] team to participate in the care of your patient, Kulwant Amin.     Impression and Plan     1. Coronary artery disease. Kulwant has known coronary artery disease. Specifically, patient underwent coronary angiography with PCI with stent placement to proximal-mid left anterior descending coronary artery (2.75×32 and 4.0×8 mm Synergy drug-eluting stents).  ?  Patient underwent nuclear perfusion imaging 1 March 2018 at [facility] which revealed no evidence of infarct or ischemia.  ?  This is stable. Patient reports no concerning angina type chest pain at this time.  ?  1. SVT. Patient at time of initial consultation by me 27 December 2017 had been reported to have an SVT with heart rate of approximately 200-220 bpm. Patient apparently responded to adenosine. 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 Differential, however, would include Gustavo-Parkinson-White with concealed accessory pathway (no delta waves seen on ECG) with orthodromic reciprocating tachycardia (ORT). Patient was started on metoprolol at that time.  She reports no significant recurrent palpitations.  Follow-up event recorder 20 March 2018 through 17 April 2018 revealed no recurrent rhythm disturbances.  ?  3. Hypertension. Blood pressure is quite reasonable in the office today.  ?  4. Dyslipidemia.  Direct LDL 20 June 2018 was favorable at 57 mg/dL.    Plan on follow-up in 1 year.           History of Present Illness    Once again I would like to thank you again for asking me to participate in the care of your  patient, Kulwant Amin.  As you know, but to reiterate for my own records, Kulwant Amin is a 49 y.o. female with known coronary artery disease. Specifically, patient underwent coronary angiography with PCI with stent placement to proximal-mid left anterior descending coronary artery (2.75×32 and 4.0×8 mm Synergy drug-eluting stents).  ?  Patient most recently underwent a nuclear perfusion study 22 January 2019 which was favorable and revealed no evidence of infarct or ischemia with normal ejection fraction of 70%.  ?  On interview today, patient states that she has been doing well from a cardiovascular standpoint. She minimizes any angina type chest pain at this time. She denies any significant lightheadedness. She states her breathing is comfortable.  She reports no recurrent subjective palpitations.    Further review of systems is otherwise negative/noncontributory (medical record and 13 point review of systems reviewed as well and pertinent positives noted).         Cardiac Diagnostics      Echocardiogram 25 January 2018:  1. Limited echocardiogram.  2. Normal left ventricular size and systolic performance with ejection fraction of 60%.  3. No pericardial effusion.     Echocardiogram 27 December 2017:  1. Normal left ventricular size and systolic performance with ejection fraction 65-70%.  2. No significant valvular heart disease.  3. Normal right ventricular size and systolic performance  4. Normal atrial dimensions.    Echocardiogram 7 July 2014:  1. Normal left ventricular size and systolic performance with ejection fraction of 55-60%.  2. No significant valvular heart disease.  3. Normal right ventricular size and systolic performance.  4. Normal atrial dimensions.    Nuclear perfusion imaging study 22 January 2019:  1. No evidence of infarct or ischemia.  2. Normal left ventricular systolic performance with ejection fraction of 70%.    Nuclear perfusion imaging 1 March 2018:  1. No evidence of infarct or  "ischemia.  2. Normal left ventricular systolic performance with ejection fraction greater than 75%.    Nuclear perfusion imaging 28 December 2017 (pre-coronary angiogram):  1. No evidence of infarct or ischemia.  2. Increased stress to rest E ratio 1.46 possibly representing multivessel disease.  3. Normal left ventricular systolic performance with ejection fraction of 75%.    Coronary angiogram 25 January 2018:  1. Left Main coronary artery: Normal.  2. Left anterior descending coronary artery: Proximal-mid 70% stenosis at diagonal bifurcation with fractional flow reserve of 0.80. 25 January 2018 with  3. Circumflex coronary artery: Mild disease.  4. Right coronary artery: Normal.  5. Status post PCI with stent placement to proximal-mid left anterior descending coronary artery (2.75×32 and 4.0×8 mm Synergy drug-eluting stents).    Holter monitor 22 January 2019:  1. Normal Holter.    30 day event recorder 20 March 2018 through 17 April 2018:  1. Normal 24-hour Holter monitor.  2. Symptoms correlating with sinus rhythm.  ?  Twelve-lead ECG (personally reviewed) 27 December 2017: Sinus rhythm. ?Normal ECG.            Physical Examination       /78 (Patient Site: Left Arm, Patient Position: Sitting, Cuff Size: Adult Regular)   Pulse 88   Resp 18   Ht 5' 4\" (1.626 m)   Wt 130 lb (59 kg) Comment: With shoes  BMI 22.31 kg/m          Wt Readings from Last 3 Encounters:   02/26/19 130 lb (59 kg)   02/08/19 130 lb 6 oz (59.1 kg)   01/22/19 126 lb (57.2 kg)     The patient is alert and oriented times three. Sclerae are anicteric. Mucosal membranes are moist. Jugular venous pressure is normal. No significant adenopathy/thyromegally appreciated. Lungs are clear with good expansion. On cardiovascular exam, the patient has a regular S1 and S2. Abdomen is soft and non-tender. Extremities reveal no clubbing, cyanosis, or edema.         Family History/Social History/Risk Factors   Patient does not smoke.  Family history " of hypertension.         Medications  Allergies   Current Outpatient Medications   Medication Sig Dispense Refill   ? albuterol (PROVENTIL) 2.5 mg /3 mL (0.083 %) nebulizer solution Take 3 mL (2.5 mg total) by nebulization every 6 (six) hours as needed for wheezing. 75 mL 12   ? amitriptyline (ELAVIL) 10 MG tablet TAKE 1 TABLET (10 MG TOTAL) BY MOUTH AT BEDTIME. 30 tablet 10   ? aspirin (ASPIR-LOW) 81 MG EC tablet Take 1 tablet (81 mg total) by mouth daily. 90 tablet 2   ? blood glucose meter (GLUCOMETER) Use 1 each As Directed 3 (three) times a day. Dispense glucometer brand per patient's insurance at pharmacy discretion.(E11.9) 1 each 0   ? blood glucose test strips Use 1 each As Directed 3 (three) times a day. Dispense brand per patient's insurance at pharmacy discretion.(E11.9) 300 strip 3   ? clopidogrel (PLAVIX) 75 mg tablet Take 1 tablet (75 mg total) by mouth daily. 90 tablet 0   ? cyanocobalamin 1000 MCG tablet Take 1,000 mcg by mouth every evening.     ? famotidine (PEPCID) 40 MG tablet Take 40 mg by mouth daily.     ? generic lancets Use 1 each As Directed 3 (three) times a day. Dispense brand per patient's insurance at pharmacy discretion.(E11.9) 300 each 3   ? hydroCHLOROthiazide (MICROZIDE) 12.5 mg capsule Take 1 capsule (12.5 mg total) by mouth daily. 90 capsule 2   ? MAPAP EXTRA STRENGTH 500 mg tablet TAKE 1 PILL BY MOUTH EVERY 6 HOURS AS NEEDED FOR PAIN 90 tablet 3   ? metFORMIN (GLUCOPHAGE) 500 MG tablet TAKE 1 TABLET (500 MG TOTAL) BY MOUTH 2 TIMES A DAY WITH MEALS FOR DIABETES 60 tablet 2   ? metoprolol tartrate (LOPRESSOR) 25 MG tablet Take 1 tablet (25 mg total) by mouth 2 (two) times a day. 180 tablet 1   ? mometasone-formoterol (DULERA) 200-5 mcg/actuation HFAA inhaler INHALE 2 PUFFS BY MOUTH TWO TIMES A DAY 26 g 11   ? montelukast (SINGULAIR) 10 mg tablet TAKE 1 PILL (10 MG TOTAL) BY MOUTH AT BEDTIME FOR ASTHMA 30 tablet 10   ? omeprazole (PRILOSEC) 20 MG capsule Take 1 capsule (20 mg total)  by mouth 2 (two) times a day before meals. 90 capsule 1   ? VENTOLIN HFA 90 mcg/actuation inhaler INHALE 2 PUFFS BY MOUTH EVERY 6 HOURS AS NEEDED FOR WHEEZING 18 g 2   ? atorvastatin (LIPITOR) 80 MG tablet Take 1 tablet (80 mg total) by mouth at bedtime. 90 tablet 3   ? doxylamine (UNISOM, DOXYLAMINE,) 25 mg tablet Take 1 tablet (25 mg total) by mouth at bedtime as needed for sleep. 30 tablet 1   ? insulin lispro (HUMALOG KWIKPEN INSULIN) 100 unit/mL pen Use per sliding scale < 150 - No insulin, 151-200 use 3 U, 201- 250 use 5 U,   251- 300 use 8 U 2 adj dose pen 0   ? polyethylene glycol (MIRALAX) 17 gram packet Take 1 packet (17 g total) by mouth daily. 30 packet 0   ? predniSONE (DELTASONE) 10 mg tablet Take 4 tabs daily for 3 days, then 3 tabs daily for 3 days, then 2 tabs daily for 3 days, then 1 tab daily for 3 days, then stop.. 30 tablet 0   ? promethazine-dextromethorphan (PROMETHAZINE-DM) 6.25-15 mg/5 mL syrup Take 5 mL by mouth 4 (four) times a day as needed for cough. 150 mL 0     No current facility-administered medications for this visit.       No Known Allergies       Lab Results   Lab Results   Component Value Date     01/11/2019    K 4.1 01/11/2019     01/11/2019    CO2 23 01/11/2019    BUN 9 01/11/2019    CREATININE 0.70 01/11/2019    CALCIUM 9.7 01/11/2019     Lab Results   Component Value Date    WBC 6.5 01/11/2019    HGB 11.0 (L) 01/11/2019    HCT 34.5 (L) 01/11/2019    MCV 78 (L) 01/11/2019     01/11/2019     Lab Results   Component Value Date    CHOL 137 11/02/2018    TRIG 230 (H) 11/02/2018    HDL 50 11/02/2018    LDLCALC 41 11/02/2018    LDLDIRECT 57 06/20/2018     Lab Results   Component Value Date    INR 1.00 11/22/2018     Lab Results   Component Value Date    BNP 28 11/22/2018     Lab Results   Component Value Date    TROPONINI <0.01 12/08/2018    TROPONINI <0.01 12/08/2018    TROPONINI <0.01 12/07/2018     Lab Results   Component Value Date    TSH 1.10 10/05/2018

## 2021-06-27 NOTE — PROGRESS NOTES
Progress Notes by Chaya Torres RN at 7/1/2019 11:00 AM     Author: Chaya Torres RN Service: -- Author Type: Registered Nurse    Filed: 7/10/2019  7:57 AM Encounter Date: 7/1/2019 Status: Signed    : Chaya Torres RN (Registered Nurse)       Patient is 49 yr old female came to see writer with daughter in law for yearly re-assessment.     Pertinent medical dx: HTN, Asthma, simple chornic bronchitis, GERD, SVT, CAD, DM II, chest pain, rectal bleeding, and GI bleeding.      Lives with son and daughter and 2 grand kids. Lives in single family home.     Multiple ED visits the past 6 months due to Asthma exacerbation and chest pain. Will continue to keep patient active with [facility] due to multiple Ed visits and complex health issues. [facility] RN will do monthly chart review to make sure patient goes to pulmonology and cardiology appts as recommended or as needed.     Denies financial issues or food insecurity.     PCA hours - 6 hours of PCA- used to get 11 hours - family already appeal, pending.      Checking BG daily - states mostly <200 but would increase to >200 if she's taking cough syrup. Didn't bring glucometer with her today.       A1c-6.3 on 3/14/19.     Declines f/u dental appt    ASHANTI Carrasquillo Mental health Practitioner [phone] ( [facility]) - been coming out the past 2 weeks. States new to the program    Due to complex medical issues and high readmission risks, [facility] RN plans to see or do outreach call with patient/CG monthly to make sure patient goes to her appts with her cardiologist, PCP and pulmonologist and med mgmt as directed.     RN Recommendations and Referrals  n/a    Action Plan    RN Will  Will add the patient to [facility] RN tracking list  Be available to the patient as nursing needs arise    Care Guide Will  1) To check if patient already schedule for [facility] on MRI - if not assist patient     Goals  Goals        Patient Stated    ? Assist patient with non-clinical needs as  they arise for patient with the coordination of her care. (pt-stated)      Action steps to achieve this goal  1. I will speak with [facility] at outreach calls.  2. I will call [facility] CHW with any concerns.    Date goal set:  7/1/19      ? RN goal: Outreach call with patient/CG monthy and PRN. (pt-stated)      Action steps to achieve this goal  1.  I will speak with [facility] RN at outreach calls.  2. I will take all my medications as prescribed.   3. I will attend my appt with [facility] RN, PCP, Pulmonologist and cardiologist as scheduled and any other specialists as recommended.   4. I will call [facility] with any concerns or questions.     Date goal set:  7/1/19              Clinic Care Coordination RN Assessed Needs  Patient Centered Assessment Method-No data recorded  Level 2:  A score of 25-36 indicates that the patient has a moderate initial need for RN or SW intervention at the discretion of the .  The RN will add this patient to her panel and follow closely in partnership with the care guide until stable.  She will reach out to the care guide for support in care coordination needs and graduate the patient to standard care guide outreach when appropriate.      PCAM (Patient Centered Assessment Method)          Emergency Plan  Emergency Plan Recommendation:    When to Use the Emergency Department (ED)  An emergency means you could die if you dont get care quickly. Or you could be hurt permanently (disabled). Read below to know when to use -- and when not to use -- an emergency department (also called ED).    Dangers to your life  Here are examples of emergencies. These need immediate care:  A hard time breathing  Severe chest pain  Choking  Severe bleeding  Suddenly not able to move or speak  Blacking out (fainting)`  Poisoning    Dangers of permanent injuries  Here are other emergencies. These also need immediate care:  Deep cuts or severe burns  Broken bones, or sudden severe pain and swelling in a joint    When its an  emergency  If you have an emergency, follow these steps:    1. Go to the nearest emergency department  If you can, go to the hospital ED closest to you right away.  If you cannot get there right away, or if it is not safe to take yourself, call 911 or your police emergency number.  2. Call your primary care doctor  Tell your doctor about the emergency. Call within 24 hours of going to the ED.  If you cannot call, have someone call for you.  Go to your doctor (not the ED) for any follow-up care.    When its not an emergency  If a problem is not an emergency, follow these steps:    1. Call your primary care doctor  If you dont know the name of your doctor, call your health plan.  If you cannot call, have someone call for you.  2. Follow instructions  Your doctor will tell you what you should do.  You may be told to see your doctor right away. You may be told to go to the ED. Or you may be told to go to an urgent care center.  Follow your doctors advice.  jLong-Term Complications of Diabetes can cause health problems over time. These are called complications. They are more likely to happen if your blood sugar is often too high. Over time, high blood sugar can damage blood vessels in your body. It is important to keep your blood sugar in your target range. This can help prevent or delay complications from diabetes.  Possible complications  Complications of diabetes include:    Eye problems, including damage to the blood vessels in the eyes (retinopathy), pressure in the eye (glaucoma), and clouding of the eyes lens (a cataract). Eye problems can eventually lead to irreversible blindness.     Tooth and gum problems (periodontal disease), causing loss of teeth and bone    Blood vessel (vascular) disease leading to circulation problems, heart attack or stroke, or a need for amputation of a limb     Problems with sexual function leading to erectile dysfunction in men and sexual discomfort in women     Kidney disease  (nephropathy) can eventually lead to kidney failure, which may require dialysis or kidney transplant     Nerve problems (neuropathy), causing pain or loss of feeling in your feet and other parts of your body, potentially leading to an amputation of a limb     High blood pressure (hypertension), putting strain on your heart and blood vessels    Serious infections, possibly leading to loss of toes, feet, or limbs  How to avoid complications  The serious consequences of these complications may be avoidable for most people with diabetes by managing your blood glucose, blood pressure, and cholesterol levels. This can help you feel better and stay healthy. You can manage diabetes by tracking your blood sugar. You can also eat healthy and exercise to avoid gaining weight. And you should take medicine if directed by your healthcare provider.  Call your health care provider if:    You vomit or have diarrhea for more than 6 hours.    Your blood glucose level is higher than usual or over 250 mg/dL after you have taken extra insulin (if recommended in your sick-day plan).    You take oral medicine for diabetes, and your blood sugar is higher than usual or over 250 mg/dL, before a meal and stays that high for more than 24 hours.    Your blood glucose is lower than usual or less than 70 mg/dL    You have moderate to large amounts of ketones in your blood or urine.    You arent better after 2 days.  Your Asthma: Flare-Ups  When you have asthma, the airways in your lungs are swollen. This narrows the airways, making it hard to breathe. During an asthma flare-up (asthma attack) the lining of the airways swells even more and makes extra mucus. This makes the airways even narrower. The muscles around the airways also tighten. This makes it even harder for air to get in and out of the lungs.  What causes flare-ups?  Flare-ups occur when the airways with asthma react to a trigger. These are things that make asthma worse. Triggers can  include smoke, odors, chemicals, pollen, pets, mold, cockroaches, and dust. Other things can also trigger a flare-up. These include exercise, having a cold or the flu, and changes in the weather.  What are the symptoms of a flare-up?  You are having might be having a flare up if you have any of the following:  Trouble breathing  Breathing faster than usual  Wheezing. This is a whistling noise when breathing out.  Feeling tightness or pain in the chest  Coughing, especially at night  Trouble sleeping  Getting tired or out of breath easily  Having trouble talking  What to do during a flare-up  When you are starting to have symptoms, dont wait! Follow your Asthma Action Plan. It should tell you exactly what symptoms signal a flare-up . It should also tell you what to do. This may include doing the following:  Use quick-relief (rescue) medicine. Quick-relief medicines to ease your breathing right away.  Measure your flow if you use peak flow monitoring. If peak flow is less than 50%, your flare-up is severe. You need to call your healthcare provider right away. You should also call 911 if you are having any of the symptoms listed in the box below.  If you do not have an Asthma Action Plan or if the plan is not up to date, talk with your healthcare provider.     When to call 911  Call 911 right away if you have any of the following symptoms. They could mean you are having severe difficulty breathing:  Very fast or hard breathing  Sinking in between the ribs and above and below the breastbone (chest retractions)  Can't walk or talk  Lips or fingers turning blue  Peak flow reading less than 50% of normal best  Not acting as normal or seems confused  Not responding to asthma treatments   Preventing worsening symptoms and flare-ups  To help control asthma, you should help have help with the following:  Work together with your healthcare provider. Controlling asthma takes teamwork. Keep all appointments with your healthcare  provider. Dont just make an appointment when you have a flare-up. Follow your Asthma Action Plan.  Use controller medicines as instructed. Make sure you use your long-term controller medicines. These may include corticosteroids and other anti-inflammatory medicines. A person with asthma can have inflamed airways any time, not just when he or she has symptoms. Controller medicines must be taken every day, even when you feels well.  Identify and manage flare-ups right away. Learn to recognize early symptoms and to act quickly. Start quick-relief medicines as instructed if you begin to have symptoms of a respiratory infection and respiratory infections trigger his or her symptoms.   Control triggers. Stay away from things that cause asthma symptoms is another important way to control asthma. Once you know the triggers, take steps to control them. For example, if someone in your household smokes, he or she should think about quitting. Many excellent stop-smoking programs and medicines can help. Also don't allow anyone to smoke near your child, including in your home and car.    3149-5117 [employer]. [address] [place] [address], [place] [address]. All rights reserved. This information is not intended as a substitute for professional medical care. Always follow your healthcare professional's instructions.

## 2021-06-27 NOTE — PROGRESS NOTES
Progress Notes by Christine Rivera PT at 2/6/2019  3:01 PM     Author: Christine Rivera PT Service: -- Author Type: Physical Therapist    Filed: 2/6/2019  3:30 PM Date of Service: 2/6/2019  3:01 PM Status: Attested    : Christine Rivera PT (Physical Therapist)    Related Notes: Original Note by France Monique PT Student (PT Student) filed at 2/6/2019  3:15 PM    Cosigner: Adrien Cardoza MD at 2/6/2019  4:37 PM    Attestation signed by Adrien Cardoza MD at 2/6/2019  4:37 PM    Dr. Adrien Cardoza  2/6/2019 4:37 PM                  Physical Therapy  Therapy Initial Plan of Care      Medical Diagnosis: Arthritis         Treatment Dx: Gait instability, Muscular Deconditioning, Falls Risk    Referring MD: Adrien Cardoza MD  Onset date: Date of referral 1/11/2019   Start of Care 02/06/2019    Assessment:  Pt is a 50 yo female that presents to PT requesting for an optimally configured manual wheelchair due to her significant pain in her knees, hips, and wrists, decreased aerobic capacity, increased risk for falls, and muscular deconditioning. Pt is currently being assisted with all of her ADLs such as dressing, bathing, grooming, feeding, and toileting with a PCA. PMH includes arthritis, asthma, COPD, depression, DM, HTN, SVT, and unstable angina. Based on PT evaluation, pt scored well below age and gender norms for TUG and Gait speed indicating walking is no longer functional.  Pt will be a good candidate for an optimally configured manual wheelchair in order to increase independence with her ADLs and functional mobility within her home.     Prognostic Indicators:  Rehabilitation potential: Fair    Impairment:  Gait, Range of Motion, Wheelchair management, Balance, Activity Tolerance and Pain    Functional Goals:  to be met by 1 evaluation and 1 visit  1. Pt and/or caregiver will verbalize understanding of current process of receiving a w/c covered by insurance.  2. Pt will have process initiated to receive proper adaptive  equipment to maximize efficiency, safety and independence with functional mobility.    Plan of Care:  Communication with referral source, patient, caregiver., Paitent/Family Instruction: Treatment plan/rationale, home exercise program, expected functional outcome. and Wheelchair Management    Frequency / Duration:  Up to 1 visit    France Monique   2/6/2019   3:01 PM  SPT under direct supervision of PT with PT directing treatment and plan of care.  Christine Rivera, PT        Physician Recommendation:  1. I certify the need for these services furnished within this plan and while under my care. I agree with the therapist's recommendation for plan of care.    2. If there is any recommendation for modification of therapy plan, please indicate below.      Physician's Signature (Printed):  _____________________________

## 2021-06-28 NOTE — PROGRESS NOTES
Progress Notes by Chaya Torres RN at 11/6/2019  8:00 AM     Author: Chaya Torres RN Service: -- Author Type: Registered Nurse    Filed: 11/6/2019  8:10 AM Encounter Date: 11/6/2019 Status: Signed    : Chaya Torres RN (Registered Nurse)       Clinic Care Coordination Contact    Follow Up Progress Note      Assessment: monthly f/u     Chart review completed today.    1) Pulmonology - attended her appt with her pulmonologist on 10/2/19. Several medication changes. Spoke with KAREN and states patient completed and Prednisone and has no concerns. Has f/u appt scheduled on 11/21/19. Reminded DIL of the appt.   Dulera was discontinued and started on  Breo,  ? Prednisone 40mg daily for 5 days.   ? I have ordered a Breo Ellipta inhaler for her to use at the high dose and instructed her on the technique and also on the fact that she needs to rinse her mouth out after using this.  Her daughter is here with her and I have told her through the  that if this inhaler is prohibitively expensive they should not pick this up and I will look for some other alternative.  ? Continue Spiriva daily.  ? Reminded her that she could use her albuterol up to 4 times a day for rescue.  ? I explained to her and her daughter in law who is here with her today that if her symptoms did not improve, that she should call us.      2) Cardiology - Coronary artery disease - attend her appt with her cardiologist on 10/3/19. per chart review HTN WNL and no major concerns or med changes during. F/u in 1 year ( around 10/3/2020)       3) last seen by PCP on 10/15/19 with new concerns. F/u in 3 months.  A1c - 6.1 on 10/1/19  Home care nurse setting up meds weekly.     KAREN has no new goals or new concerns at this time. States she will continue to take patient to her appts as scheduled.       Plan:   Okay to step down to maintenance    Emergency Plan Recommendation:    When to Use the Emergency Department (ED)  An emergency means you could die if  you dont get care quickly. Or you could be hurt permanently (disabled). Read below to know when to use -- and when not to use -- an emergency department (also called ED).    Dangers to your life  Here are examples of emergencies. These need immediate care:  A hard time breathing  Severe chest pain  Choking  Severe bleeding  Suddenly not able to move or speak  Blacking out (fainting)`  Poisoning    Dangers of permanent injuries  Here are other emergencies. These also need immediate care:  Deep cuts or severe burns  Broken bones, or sudden severe pain and swelling in a joint    When its an emergency  If you have an emergency, follow these steps:    1. Go to the nearest emergency department  If you can, go to the hospital ED closest to you right away.  If you cannot get there right away, or if it is not safe to take yourself, call 911 or your police emergency number.  2. Call your primary care doctor  Tell your doctor about the emergency. Call within 24 hours of going to the ED.  If you cannot call, have someone call for you.  Go to your doctor (not the ED) for any follow-up care.    When its not an emergency  If a problem is not an emergency, follow these steps:    1. Call your primary care doctor  If you dont know the name of your doctor, call your health plan.  If you cannot call, have someone call for you.  2. Follow instructions  Your doctor will tell you what you should do.  You may be told to see your doctor right away. You may be told to go to the ED. Or you may be told to go to an urgent care center.  Follow your doctors advice.  High Blood Pressure (Hypertension)  You have been diagnosed with high blood pressure (also called hypertension). This means the force of blood against your artery walls is too strong. It also means your heart is working hard to move blood. High blood pressure usually has no symptoms, but over time, it can damage your heart, blood vessels, eyes, kidneys, and other organs. With help  from your doctor, you can manage your blood pressure and protect your health.  Taking medications    Learn to take your own blood pressure. Keep a record of your results. Ask your doctor which readings mean that you need medical attention.    Take your blood pressure medication exactly as directed. Dont skip doses. Missing doses can cause your blood pressure to get out of control.    Avoid medications that contain heart stimulants, including over-the-counter drugs. Check for warnings about high blood pressure on the label.    Check with your doctor before taking a decongestant. Some decongestants can worsen high blood pressure.  Lifestyle changes    Maintain a healthy weight. Get help to lose any extra pounds.    Cut back on salt.  o Limit canned, dried, packaged, and fast foods.  o Dont add salt to your food at the table.  o Season foods with herbs instead of salt when you cook.    Follow the DASH (Dietary Approaches to Stop Hypertension) eating plan. This plan recommends vegetables, fruits, whole gains, and other heart healthy foods.    Begin an exercise program. Ask your doctor how to get started. The American Heart Association recommends aerobic exercise 3 to 4 times a week for an average of 40 minutes at a time, with your doctor's approval. Simple activities like walking or gardening can help.    Break the smoking habit. Enroll in a stop-smoking program to improve your chances of success. Ask your health care provider about programs and medications to help you stop smoking.    Limit drinks that contain caffeine (coffee, black or green tea, cola) to 2 per day.    Never take stimulants such as amphetamines or cocaine; these drugs can be deadly for someone with high blood pressure.    Control your stress. Learn stress-management techniques.    Limit alcohol to no more than 1 drink a day for women and 2 drinks a day for men.  Follow-up care  Make a follow-up appointment as directed by our staff.     When to seek  medical care  Call your doctor immediately if you have any of the following:    Chest pain or shortness of breath (call 911)    Moderate to severe headache    Weakness in the muscles of your face, arms, or legs    Trouble speaking    Extreme drowsiness    Confusion    Fainting or dizziness    Pulsating or rushing sound in your ears    Unexplained nosebleed    Weakness, tingling, or numbness of your face, arms, or legs    Change in vision    Blood pressure measured at home that is greater than 180/110     Your Asthma: Flare-Ups  When you have asthma, the airways in your lungs are swollen. This narrows the airways, making it hard to breathe. During an asthma flare-up (asthma attack) the lining of the airways swells even more and makes extra mucus. This makes the airways even narrower. The muscles around the airways also tighten. This makes it even harder for air to get in and out of the lungs.  What causes flare-ups?  Flare-ups occur when the airways with asthma react to a trigger. These are things that make asthma worse. Triggers can include smoke, odors, chemicals, pollen, pets, mold, cockroaches, and dust. Other things can also trigger a flare-up. These include exercise, having a cold or the flu, and changes in the weather.  What are the symptoms of a flare-up?  You are having might be having a flare up if you have any of the following:  Trouble breathing  Breathing faster than usual  Wheezing. This is a whistling noise when breathing out.  Feeling tightness or pain in the chest  Coughing, especially at night  Trouble sleeping  Getting tired or out of breath easily  Having trouble talking  What to do during a flare-up  When you are starting to have symptoms, dont wait! Follow your Asthma Action Plan. It should tell you exactly what symptoms signal a flare-up . It should also tell you what to do. This may include doing the following:  Use quick-relief (rescue) medicine. Quick-relief medicines to ease your breathing  right away.  Measure your flow if you use peak flow monitoring. If peak flow is less than 50%, your flare-up is severe. You need to call your healthcare provider right away. You should also call 911 if you are having any of the symptoms listed in the box below.  If you do not have an Asthma Action Plan or if the plan is not up to date, talk with your healthcare provider.     When to call 911  Call 911 right away if you have any of the following symptoms. They could mean you are having severe difficulty breathing:  Very fast or hard breathing  Sinking in between the ribs and above and below the breastbone (chest retractions)  Can't walk or talk  Lips or fingers turning blue  Peak flow reading less than 50% of normal best  Not acting as normal or seems confused  Not responding to asthma treatments   Preventing worsening symptoms and flare-ups  To help control asthma, you should help have help with the following:  Work together with your healthcare provider. Controlling asthma takes teamwork. Keep all appointments with your healthcare provider. Dont just make an appointment when you have a flare-up. Follow your Asthma Action Plan.  Use controller medicines as instructed. Make sure you use your long-term controller medicines. These may include corticosteroids and other anti-inflammatory medicines. A person with asthma can have inflamed airways any time, not just when he or she has symptoms. Controller medicines must be taken every day, even when you feels well.  Identify and manage flare-ups right away. Learn to recognize early symptoms and to act quickly. Start quick-relief medicines as instructed if you begin to have symptoms of a respiratory infection and respiratory infections trigger his or her symptoms.   Control triggers. Stay away from things that cause asthma symptoms is another important way to control asthma. Once you know the triggers, take steps to control them. For example, if someone in your household  smokes, he or she should think about quitting. Many excellent stop-smoking programs and medicines can help. Also don't allow anyone to smoke near your child, including in your home and car.    2405-4543 [employer]. [address] [place] [address], [place] [address]. All rights reserved. This information is not intended as a substitute for professional medical care. Always follow your healthcare professional's instructions.

## 2021-06-28 NOTE — PROGRESS NOTES
Progress Notes by Del Hirsch MD at 10/3/2019  8:10 AM     Author: Del Hirsch MD Service: -- Author Type: Physician    Filed: 10/3/2019  8:24 AM Encounter Date: 10/3/2019 Status: Signed    : Del Hirsch MD (Physician)                  [email or web address]  [phone]         Thank you Dr. Cardoza for asking the [facility] team to participate in the care of your patient, Kulwant Amin.     Impression and Plan      1. Coronary artery disease. Kulwant has known coronary artery disease. Specifically, patient underwent coronary angiography with PCI with stent placement to proximal-mid left anterior descending coronary artery (2.75×32 and 4.0×8 mm Synergy drug-eluting stents).  ?  Patient underwent nuclear perfusion imaging 1 March 2018 at [facility] which revealed no evidence of infarct or ischemia.  ?  Repeat ischemic work-up involving a regadenoson MRI 24 July 2019 return favorable and revealed no evidence of ischemia (see Cardiac Diagnostic section below).    On interview today, patient reports no chest pain symptoms reminiscent of her angina.     2. SVT. Patient at time of initial consultation by me 27 December 2017 had been reported to have an SVT with heart rate of approximately 200-220 bpm. Patient apparently responded to adenosine. 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 Differential, however, would include Gustavo-Parkinson-White with concealed accessory pathway (no delta waves seen on ECG) with orthodromic reciprocating tachycardia (ORT). Patient was started on metoprolol at that time.  She reports no significant recurrent palpitations.  Follow-up event recorder 20 March 2018 through 17 April 2018 revealed no recurrent rhythm disturbances.  Recent Holter monitor 13 August 2019 was also normal.  ?  3. Hypertension. Blood pressure is quite reasonable in the office today.  ?  4.  "Dyslipidemia.  Lipid profile 14 March 2019 was favorable with LDL 59 mg/dL and HDL 44 mg/dL.     Plan on follow-up in 1 year.          History of Present Illness    Once again I would like to thank you again for asking me to participate in the care of your patient, Kulwant Amin.  As you know, but to reiterate for my own records, Kulwant Amin is a 49 y.o. female with known coronary artery disease. Specifically, patient underwent coronary angiography 25 January 2018 with PCI with stent placement to proximal-mid left anterior descending coronary artery (2.75×32 and 4.0×8 mm Synergy drug-eluting stents).  ?  Patient most recently underwent a nuclear perfusion study 22 January 2019 which was favorable and revealed no evidence of infarct or ischemia with normal ejection fraction of 70%.  ?  On interview today, patient states that she has not had any anginal type chest pain.  She does, however, have some intermittent retrosternal \"burning\" consistent with her reflux.  This is exacerbated by certain foods such as tomatoes.  He states her breathing is comfortable.  She reports no palpitations or lightheadedness.    Further review of systems is otherwise negative/noncontributory (medical record and 13 point review of systems reviewed as well and pertinent positives noted).         Cardiac Diagnostics      Echocardiogram 23 May 2019:  1. Normal left ventricular size and systolic performance with ejection fraction of 65 to 70%.  2. No significant valvular heart disease.  3. Normal right ventricular size and systolic performance.  4. Normal atrial dimensions.    Echocardiogram 25 January 2018:  1. Limited echocardiogram.  2. Normal left ventricular size and systolic performance with ejection fraction of 60%.  3. No pericardial effusion.    Echocardiogram 27 December 2017:  1. Normal left ventricular size and systolic performance with ejection fraction 65-70%.  2. No significant valvular heart disease.  3. Normal right " ventricular size and systolic performance  4. Normal atrial dimensions.    Echocardiogram 7 July 2014:  1. Normal left ventricular size and systolic performance with ejection fraction of 55-60%.  2. No significant valvular heart disease.  3. Normal right ventricular size and systolic performance.  4. Normal atrial dimensions.    Regadenoson MRI 24 July 2019:  1. Regadenoson stress cardiac MRI is negative for inducible myocardial ischemia.   2. Regadenoson stress ECG is negative for inducible myocardial ischemia.  3. No previous myocardial infarction is identified.  4. Small left ventricular size, wall thickness and function. The calculated left ventricular ejection fraction is 69%.  5. Normal right ventricular size and function.  6. No obvious valvular disease.    Nuclear perfusion imaging study 22 January 2019:  1. No evidence of infarct or ischemia.  2. Normal left ventricular systolic performance with ejection fraction of 70%.    3. Nuclear perfusion imaging 1 March 2018:  4. No evidence of infarct or ischemia.  5. Normal left ventricular systolic performance with ejection fraction greater than 75%.    Nuclear perfusion imaging 28 December 2017 (pre-coronary angiogram):  1. No evidence of infarct or ischemia.  2. Increased stress to rest E ratio 1.46 possibly representing multivessel disease.  3. Normal left ventricular systolic performance with ejection fraction of 75%.    Coronary angiogram 25 January 2018:  1. Left Main coronary artery: Normal.  2. Left anterior descending coronary artery: Proximal-mid 70% stenosis at diagonal bifurcation with fractional flow reserve of 0.80. 25 January 2018 with  3. Circumflex coronary artery: Mild disease.  4. Right coronary artery: Normal.  5. Status post PCI with stent placement to proximal-mid left anterior descending coronary artery (2.75×32 and 4.0×8 mm Synergy drug-eluting stents).    Holter monitor 13 August 2019:  1. A normal Holter monitor.    Holter monitor 22  January 2019:  2. Normal Holter.    30 day event recorder 20 March 2018 through 17 April 2018:  1. Normal 24-hour Holter monitor.  2. Symptoms correlating with sinus rhythm.    Twelve-lead ECG (personally reviewed) 27 December 2017: Sinus rhythm. ?Normal ECG.           Physical Examination       /82 (Patient Site: Left Arm, Patient Position: Sitting, Cuff Size: Adult Regular)   Pulse 84   Resp 24   Ht 5' (1.524 m)   Wt 123 lb (55.8 kg)   BMI 24.02 kg/m          Wt Readings from Last 3 Encounters:   10/03/19 123 lb (55.8 kg)   10/02/19 125 lb (56.7 kg)   10/01/19 125 lb 2 oz (56.8 kg)     The patient is alert and oriented times three. Sclerae are anicteric. Mucosal membranes are moist. Jugular venous pressure is normal. No significant adenopathy/thyromegally appreciated. Lungs are clear with good expansion. On cardiovascular exam, the patient has a regular S1 and S2. Abdomen is soft and non-tender. Extremities reveal no clubbing, cyanosis, or edema.     Family History/Social History/Risk Factors   Patient does not smoke.  Family history of hypertension.         Medications  Allergies   Current Outpatient Medications   Medication Sig Dispense Refill   ? acetaminophen (MAPAP EXTRA STRENGTH) 500 MG tablet TAKE 1 PILL BY MOUTH EVERY 6 HOURS AS NEEDED FOR PAIN 90 tablet 3   ? albuterol (PROVENTIL) 2.5 mg /3 mL (0.083 %) nebulizer solution Take 3 mL (2.5 mg total) by nebulization every 6 (six) hours as needed for wheezing. 150 mL 12   ? albuterol (VENTOLIN HFA) 90 mcg/actuation inhaler Inhale 2 puffs every 6 (six) hours as needed for wheezing. 1 Inhaler 3   ? amitriptyline (ELAVIL) 10 MG tablet Take 1 tablet (10 mg total) by mouth at bedtime. 30 tablet 10   ? aspirin (ASPIR-LOW) 81 MG EC tablet Take 1 tablet (81 mg total) by mouth daily. 90 tablet 2   ? atorvastatin (LIPITOR) 80 MG tablet Take 1 tablet (80 mg total) by mouth at bedtime. 90 tablet 3   ? blood glucose meter (GLUCOMETER) Use 1 each As Directed 3  (three) times a day. Dispense glucometer brand per patient's insurance at pharmacy discretion.(E11.9) 1 each 0   ? blood glucose test strips Use 1 each As Directed 3 (three) times a day. Dispense brand per patient's insurance at pharmacy discretion.(E11.9) 300 strip 3   ? calcium carbonate (CALCIUM CARBONATE) 300 mg (750 mg) Chew Chew as needed.     ? clopidogrel (PLAVIX) 75 mg tablet TAKE 1 TABLET (75 MG TOTAL) BY MOUTH DAILY. 90 tablet 1   ? cyanocobalamin 1000 MCG tablet Take 1,000 mcg by mouth daily.            ? doxylamine (UNISOM, DOXYLAMINE,) 25 mg tablet Take 1 tablet (25 mg total) by mouth at bedtime as needed for sleep. 30 tablet 1   ? generic lancets Use 1 each As Directed 3 (three) times a day. Dispense brand per patient's insurance at pharmacy discretion.(E11.9) 300 each 3   ? hydroCHLOROthiazide (MICROZIDE) 12.5 mg capsule Take 1 capsule (12.5 mg total) by mouth daily. 90 capsule 2   ? insulin aspart U-100 (NOVOLOG) 100 unit/mL injection Inject under the skin daily. Use per sliding scale < 150 - No insulin, 151-200 use 3 U, 201- 250 use 5 U,   251- 300 use 8 U     ? metFORMIN (GLUCOPHAGE) 500 MG tablet TAKE 1 TABLET (500 MG TOTAL) BY MOUTH 2 TIMES A DAY WITH MEALS FOR DIABETES 60 tablet 2   ? metoprolol tartrate (LOPRESSOR) 25 MG tablet Take 1 tablet (25 mg total) by mouth 2 (two) times a day. 180 tablet 1   ? mometasone-formoterol (DULERA) 200-5 mcg/actuation HFAA inhaler INHALE 2 PUFFS BY MOUTH TWO TIMES A DAY 26 g 11   ? montelukast (SINGULAIR) 10 mg tablet TAKE 1 PILL (10 MG TOTAL) BY MOUTH AT BEDTIME FOR ASTHMA 30 tablet 10   ? omeprazole (PRILOSEC) 20 MG capsule Take 1 capsule (20 mg total) by mouth daily before breakfast. 90 capsule 1   ? polyethylene glycol (GLYCOLAX) 17 gram/dose powder MIX 17 GRAMS WITH LIQUID AND DRINK ONCE DAILY FOR CONSTIPATION 510 g 12   ? predniSONE (DELTASONE) 20 MG tablet Take 40 mg by mouth daily for 5 days. 10 tablet 0   ? tiotropium (SPIRIVA) 18 mcg inhalation  capsule Place 1 capsule (2 puffs total) into inhaler and inhale daily. 30 capsule 11   ? famotidine (PEPCID) 40 MG tablet TAKE 1 PILL (40 MG) BY MOUTH TWICE DAILY FOR STOMACH 60 tablet 10   ? fluticasone furoate-vilanterol (BREO ELLIPTA) 200-25 mcg/dose DsDv inhaler Inhale 1 puff daily. 1 each 12     No current facility-administered medications for this visit.       No Known Allergies       Lab Results   Lab Results   Component Value Date     09/21/2019    K 3.6 09/21/2019     09/21/2019    CO2 24 09/21/2019    BUN 7 (L) 09/21/2019    CREATININE 0.73 09/21/2019    CALCIUM 9.7 09/21/2019     Lab Results   Component Value Date    WBC 10.5 09/21/2019    HGB 11.6 (L) 09/21/2019    HCT 38.9 09/21/2019    MCV 78 (L) 09/21/2019     09/21/2019     Lab Results   Component Value Date    CHOL 133 03/14/2019    TRIG 152 (H) 03/14/2019    HDL 44 (L) 03/14/2019    LDLCALC 59 03/14/2019    LDLDIRECT 57 06/20/2018     Lab Results   Component Value Date    INR 1.01 05/22/2019     Lab Results   Component Value Date    BNP 12 06/23/2019     Lab Results   Component Value Date    TROPONINI <0.01 08/02/2019    TROPONINI <0.01 05/23/2019    TROPONINI <0.01 05/22/2019     Lab Results   Component Value Date    TSH 1.10 10/05/2018

## 2021-06-28 NOTE — PROGRESS NOTES
Progress Notes by Chaay Torres RN at 1/17/2020 10:54 AM     Author: Chaya Torres RN Service: -- Author Type: Registered Nurse    Filed: 1/17/2020 11:05 AM Encounter Date: 1/17/2020 Status: Signed    : Chaya Torres RN (Registered Nurse)       Clinic Care Coordination Contact    Situation: Patient chart reviewed by care coordinator.    Background:   Britney Meade, KOLE  You 11 days ago      No new CCC goals, please review for [facility] Graduation. I did warm transfer Billy to the nurse triage line as she reported Kulwant was having shortness of breath and burning sensation in her chest.     Routing comment          Assessment:   Chart review completed today  Patient had multiple ED visits and hospitalization the past 6 months due Asthma exacerbation, chest pain etc. - chronic medical conditions.  Spoke with daughterFlo today.   Daughter states her mom has chronic medical problems and was told by PCP togo to ED for any breathing issues.   Daughter states they would try inhalers as prescribed and patient takes her meds as directed.   States they would call the clinic when patient has breathing issues or chest pain then triage nurse instructed them to go to ED for evaluation.   Daughter states they were educated many times about patient's health problems and when to see PCP vs walk-in clinic vs ED.   States sometime was told by home care nurse to go to ED for eval.  Daughter gets frustrated with writer mid conversation.   States has no new goals or concerns at this time.   States patient feels much better since discharged from the hospital.   Was seen by PCP on 1/16/2020 with no major concerns.   Has home care nurse setting up meds.   Daughter reluctant to be discharged from [facility] but can't come up with any new goals or concerns to be addressed.   Instructed daughter to call the clinic with any concerns or questions and to let PCP know if new needs arise and would like to be referral back to CCC. Verbalized understanding.      Plan/Recommendations:   Okay to graduate    Emergency Plan Recommendation:    When to Use the Emergency Department (ED)  An emergency means you could die if you dont get care quickly. Or you could be hurt permanently (disabled). Read below to know when to use -- and when not to use -- an emergency department (also called ED).    Dangers to your life  Here are examples of emergencies. These need immediate care:  A hard time breathing  Severe chest pain  Choking  Severe bleeding  Suddenly not able to move or speak  Blacking out (fainting)`  Poisoning    Dangers of permanent injuries  Here are other emergencies. These also need immediate care:  Deep cuts or severe burns  Broken bones, or sudden severe pain and swelling in a joint    When its an emergency  If you have an emergency, follow these steps:    1. Go to the nearest emergency department  If you can, go to the hospital ED closest to you right away.  If you cannot get there right away, or if it is not safe to take yourself, call 911 or your police emergency number.  2. Call your primary care doctor  Tell your doctor about the emergency. Call within 24 hours of going to the ED.  If you cannot call, have someone call for you.  Go to your doctor (not the ED) for any follow-up care.    When its not an emergency  If a problem is not an emergency, follow these steps:    1. Call your primary care doctor  If you dont know the name of your doctor, call your health plan.  If you cannot call, have someone call for you.  2. Follow instructions  Your doctor will tell you what you should do.  You may be told to see your doctor right away. You may be told to go to the ED. Or you may be told to go to an urgent care center.  Follow your doctors advice.  Your Asthma: Flare-Ups  When you have asthma, the airways in your lungs are swollen. This narrows the airways, making it hard to breathe. During an asthma flare-up (asthma attack) the lining of the airways swells even more and  makes extra mucus. This makes the airways even narrower. The muscles around the airways also tighten. This makes it even harder for air to get in and out of the lungs.  What causes flare-ups?  Flare-ups occur when the airways with asthma react to a trigger. These are things that make asthma worse. Triggers can include smoke, odors, chemicals, pollen, pets, mold, cockroaches, and dust. Other things can also trigger a flare-up. These include exercise, having a cold or the flu, and changes in the weather.  What are the symptoms of a flare-up?  You are having might be having a flare up if you have any of the following:  Trouble breathing  Breathing faster than usual  Wheezing. This is a whistling noise when breathing out.  Feeling tightness or pain in the chest  Coughing, especially at night  Trouble sleeping  Getting tired or out of breath easily  Having trouble talking  What to do during a flare-up  When you are starting to have symptoms, dont wait! Follow your Asthma Action Plan. It should tell you exactly what symptoms signal a flare-up . It should also tell you what to do. This may include doing the following:  Use quick-relief (rescue) medicine. Quick-relief medicines to ease your breathing right away.  Measure your flow if you use peak flow monitoring. If peak flow is less than 50%, your flare-up is severe. You need to call your healthcare provider right away. You should also call 911 if you are having any of the symptoms listed in the box below.  If you do not have an Asthma Action Plan or if the plan is not up to date, talk with your healthcare provider.     When to call 911  Call 911 right away if you have any of the following symptoms. They could mean you are having severe difficulty breathing:  Very fast or hard breathing  Sinking in between the ribs and above and below the breastbone (chest retractions)  Can't walk or talk  Lips or fingers turning blue  Peak flow reading less than 50% of normal best  Not  acting as normal or seems confused  Not responding to asthma treatments   Preventing worsening symptoms and flare-ups  To help control asthma, you should help have help with the following:  Work together with your healthcare provider. Controlling asthma takes teamwork. Keep all appointments with your healthcare provider. Dont just make an appointment when you have a flare-up. Follow your Asthma Action Plan.  Use controller medicines as instructed. Make sure you use your long-term controller medicines. These may include corticosteroids and other anti-inflammatory medicines. A person with asthma can have inflamed airways any time, not just when he or she has symptoms. Controller medicines must be taken every day, even when you feels well.  Identify and manage flare-ups right away. Learn to recognize early symptoms and to act quickly. Start quick-relief medicines as instructed if you begin to have symptoms of a respiratory infection and respiratory infections trigger his or her symptoms.   Control triggers. Stay away from things that cause asthma symptoms is another important way to control asthma. Once you know the triggers, take steps to control them. For example, if someone in your household smokes, he or she should think about quitting. Many excellent stop-smoking programs and medicines can help. Also don't allow anyone to smoke near your child, including in your home and car.    4589-2468 [employer]. [address] [place] [address], [place] [address]. All rights reserved. This information is not intended as a substitute for professional medical care. Always follow your healthcare professional's instructions.            Tips forTakingMedicine  Its easy to forget to take your medicine, especially when you take a lot of pills. But, to get the best results from medicines, always take them as directed. The tips on these pages can help you keep track.  Staying on schedule  Every medicine has a different purpose.  So, each one needs to be taken as prescribed. Dont skip pills or stop taking a medicine, even when you feel fine. To stay on track try to:  Take your medicine at set times. You could take it each morning with breakfast or right before you go to bed. Some medicines may need to be taken at certain times of the day, or with food. Ask your doctor if this is the case for any of your medicines.  Find ways to remind yourself to take medicine. Use a pillbox or organize pills for the week. Set your watch or cell phone alarm to go off when youre supposed to take your medicine. Or, put a note on the bathroom mirror to remind yourself.  Have your prescriptions refilled while you still have plenty of pills left. Keep in mind that certain suppliers, such as mail order pharmacies, may take longer to fill prescriptions.  When traveling, keep all medicines in your carry-on bag. This way youll have them in case you and your checked luggage get . Also, bring copies of each of your prescriptions when you travel.  Safety tips  Read the warning labels and usage instructions for each medicine you take. Also, keep these safety tips in mind:  Get help organizing your pills if you need it. Taking more than one medicine can be confusing. A family member or friend can help prevent you from making a mistake that could be dangerous to your health.  Fill all your prescriptions at the same drug store. This way, your records are all in one place.  Ask your pharmacist or doctor for a fact sheet or other patient information when you start a new medicine.  Tell your doctor and pharmacist if you have allergies to any medicine.  Dont split your pills to save money. Talk to your doctor if youre having trouble paying for your medicine.  Never share medicine with anyone.  Ask your pharmacy how you should dispose of old or  medicine.  Give a copy of your medicine list to a family member or close friend. Hold copies of each others lists in  case of emergency.  Store medicines in a cool, dry, dark place - not in a steamy bathroom.  Make sure you tell your healthcare providers if you are taking any other supplements or drugs over-the-counter.  If you have side effects  Some medicines can cause side effects, such as nausea or dizziness. Tell your doctor if you have any side effects. He or she may change the dosage or schedule to reduce effects. Be sure to keep taking your medicine as directed, and always talk to your healthcare team about how you feel. Your feedback will help the doctor find the best medicine plan for you.  Know the Medications YoureTaking   You should know certain details about your medications. This helps you take them correctly and safely. For each medicine, ask your doctor or pharmacist the questions below. Write down the answers so you dont forget. Then fill in the information on your medication list. Also, ask about anything you dont understand or that seems wrong. For instance, if you get a refill and the pills dont look like the ones from last time, talk to the pharmacist before taking them.  Questions to ask   What is the medication's name? Find out the brand name as well as the generic name, if any.  Why am I taking this? What does it do? How fast will it work?  How often should I take this? At what time of day?  How much of the medication (what dosage) should I take? How many pills is that?  What should I do if I miss a dose? What are some of the symptoms that may occur if I miss a dose?  Should I expect any side effects from this medication? What should I do if I have them?  Do I follow any special instructions while taking this? Are there any activities, foods, or other medicines I should avoid while taking this?  How long should I keep taking this? When I run out, should I order more?  How often should I come in to have this medication monitored?  Beware of medication interactions  Vitamins, herbal supplements, and some  over-the-counter drugs can be dangerous to take if you use heart medications. So tell your doctor about all products youre taking. This includes even simple remedies for headaches, allergies, colds, or constipation. Show your medication list to the pharmacist every time you buy prescription or over-the-counter medication. They can tell you which drugs to avoid. Also, drinking alcohol while taking heart medication can be dangerous.

## 2021-06-28 NOTE — PROGRESS NOTES
Progress Notes by Britney Meade CHW at 1/17/2020 11:38 AM     Author: Britney Meade CHW Service: -- Author Type: Community Health Worker    Filed: 1/17/2020 11:39 AM Encounter Date: 1/17/2020 Status: Signed    : Britney Meade CHW (Community Health Worker)       My Clinic Care Coordination Wellness Plan    This Graduation Wellness Plan provides private information in regard to the work I have done with my Care Team at my Primary Care Clinic.  This document provides insight on the goals I have accomplished.  My Care Team congratulates me on my journey to maintain wellness.  This document will help guide me on my journey to maintain a healthy lifestyle.  I will use this to help me overcome any barriers I may encounter.  If I should have any questions or concerns, I will contact the members of my Care Team or contact my Primary Care Clinic for assistance.       [facility]  [address] [place] [address]  [phone]      My Preferred Method of Contact:  Phone: [phone]    My Primary/Preferred Language:  Sinhala    Preferred Learning Style:  Face to face discussion and Hands on teaching    Emergency Contact: Extended Emergency Contact Information  Primary Emergency Contact: Billy Amin  Address: [address] [place] [address] [facility]  Home Phone: [phone]  Mobile Phone: [phone]  Relation: Daughter-In-Law  Secondary Emergency Contact: Rhys Amin  Address: [address] [place] [address] [id number] [place] [address]  Home Phone: [phone]  Relation: Child     PCP:  Adrien Cardoza MD  Specialists:    Care Team            Adrien Cardoza MD   [phone] PCP - General, Family Medicine        [facility] Care Coordinator- Crissy Reza   [phone] Registered Nurse    [facility]   [phone] Patient Care Assistant    Alt#: [phone]    SCOTT Stern   [phone] Registered Nurse, Home Health Services    [facility]  [facility]# [phone] - weekly MSU    Nani MD Adrien   [phone] Assigned PCP        Accomplishments:  Goals        Patient Stated    ? COMPLETED: I want support to help me better manage my breathing at home.  (pt-stated)      Action steps to achieve this goal  1.  I will talk with care guide at outreach calls.   2.  I will tell my PCA/family  to close the windows on hot humid and bad air days. (Ongoing and completing)  3.  I will use my inhalers and nebulizer as directed to help me with breathing daily. (Ongoing and completing)    Date goal set:  06/15/18  Discussed: 7/17/18; 9/13/18; 10/15/18      ? COMPLETED: I want to manage my GI concerns. (pt-stated)      Action steps to achieve this goal  1.  (Per Billy) I will answer my phone and schedule Kulwant for a GI appointment.  2.  (Per Billy) I will make sure Kulwant avoids tomatoes and citrus foods.  3.  (Per Billy) Kulwant completed a GI appointment on 10/24 and will complete a colonoscopy in January 2019.    Date goal set:  10/15/18    Discussed: 11/14/18; 2/7/2019      ? COMPLETED: I will attend an MRI appointment in the next 30 days. (pt-stated)      Personal Plan  1.  I attended my MRI appointment on Wednesday 7/24 at 1pm as scheduled.      Date goal set:  7/9/2019    Discussed/Updated: 7/23/19; 9/27/19      ? COMPLETED: I will attend my follow up Pulmonolgy appointment in October. (pt-stated)      Personal Plan  1. I attended my appointment on 10/2/19 at 11am and I have a follow up in November.      Date goal set:  7/1/19    Discussed: 10/1/19; 10/31/19      ? COMPLETED: RN goal: CCC RN will do outreach call, chart review monthly in the next 6 months.  (pt-stated)      Action steps to achieve this goal  1.  I will speak with CCC RN at outreach calls.  2. I will take all my medications as prescribed.   3. I will attend my appt with CCC RN, PCP, Pulmonologist and cardiologist as scheduled and any other specialists as recommended.   4. I will call CHW  with any concerns or questions.       Goal updated: 8/2/19  Date goal set:  7/1/19            Advanced Directive/Living Will: On file with the clinic    Clinical Emergency Plan  When to Use the Emergency Department (ED)  An emergency means you could die if you dont get care quickly. Or you could be hurt permanently (disabled). Read below to know when to use -- and when not to use -- an emergency department (also called ED).     Dangers to your life  Here are examples of emergencies. These need immediate care:  A hard time breathing  Severe chest pain  Choking  Severe bleeding  Suddenly not able to move or speak  Blacking out (fainting)`  Poisoning     Dangers of permanent injuries  Here are other emergencies. These also need immediate care:  Deep cuts or severe burns  Broken bones, or sudden severe pain and swelling in a joint     When its an emergency  If you have an emergency, follow these steps:     1. Go to the nearest emergency department  If you can, go to the hospital ED closest to you right away.  If you cannot get there right away, or if it is not safe to take yourself, call 911 or your police emergency number.  2. Call your primary care doctor  Tell your doctor about the emergency. Call within 24 hours of going to the ED.  If you cannot call, have someone call for you.  Go to your doctor (not the ED) for any follow-up care.     When its not an emergency  If a problem is not an emergency, follow these steps:     1. Call your primary care doctor  If you dont know the name of your doctor, call your health plan.  If you cannot call, have someone call for you.  2. Follow instructions  Your doctor will tell you what you should do.  You may be told to see your doctor right away. You may be told to go to the ED. Or you may be told to go to an urgent care center.  Follow your doctors advice.       Your Asthma: Flare-Ups  When you have asthma, the airways in your lungs are swollen. This narrows the airways, making  it hard to breathe. During an asthma flare-up (asthma attack) the lining of the airways swells even more and makes extra mucus. This makes the airways even narrower. The muscles around the airways also tighten. This makes it even harder for air to get in and out of the lungs.  What causes flare-ups?  Flare-ups occur when the airways with asthma react to a trigger. These are things that make asthma worse. Triggers can include smoke, odors, chemicals, pollen, pets, mold, cockroaches, and dust. Other things can also trigger a flare-up. These include exercise, having a cold or the flu, and changes in the weather.  What are the symptoms of a flare-up?  You are having might be having a flare up if you have any of the following:    Trouble breathing    Breathing faster than usual    Wheezing. This is a whistling noise when breathing out.    Feeling tightness or pain in the chest    Coughing, especially at night    Trouble sleeping    Getting tired or out of breath easily    Having trouble talking  What to do during a flare-up  When you are starting to have symptoms, dont wait! Follow your Asthma Action Plan. It should tell you exactly what symptoms signal a flare-up . It should also tell you what to do. This may include doing the following:    Use quick-relief (rescue) medicine. Quick-relief medicines to ease your breathing right away.    Measure your flow if you use peak flow monitoring. If peak flow is less than 50%, your flare-up is severe. You need to call your healthcare provider right away. You should also call 911 if you are having any of the symptoms listed in the box below.  If you do not have an Asthma Action Plan or if the plan is not up to date, talk with your healthcare provider.      When to call 911  Call 911 right away if you have any of the following symptoms. They could mean you are having severe difficulty breathing:    Very fast or hard breathing    Sinking in between the ribs and above and below the  breastbone (chest retractions)    Can't walk or talk    Lips or fingers turning blue    Peak flow reading less than 50% of normal best    Not acting as normal or seems confused    Not responding to asthma treatments    Preventing worsening symptoms and flare-ups  To help control asthma, you should help have help with the following:    Work together with your healthcare provider. Controlling asthma takes teamwork. Keep all appointments with your healthcare provider. Dont just make an appointment when you have a flare-up. Follow your Asthma Action Plan.    Use controller medicines as instructed. Make sure you use your long-term controller medicines. These may include corticosteroids and other anti-inflammatory medicines. A person with asthma can have inflamed airways any time, not just when he or she has symptoms. Controller medicines must be taken every day, even when you feels well.    Identify and manage flare-ups right away. Learn to recognize early symptoms and to act quickly. Start quick-relief medicines as instructed if you begin to have symptoms of a respiratory infection and respiratory infections trigger his or her symptoms.     Control triggers. Stay away from things that cause asthma symptoms is another important way to control asthma. Once you know the triggers, take steps to control them. For example, if someone in your household smokes, he or she should think about quitting. Many excellent stop-smoking programs and medicines can help. Also don't allow anyone to smoke near your child, including in your home and car.    0263-8690 [employer]. [address] [place] [address], [place] [address]. All rights reserved. This information is not intended as a substitute for professional medical care. Always follow your healthcare professional's instructions.                 Tips forTakingMedicine  Its easy to forget to take your medicine, especially when you take a lot of pills. But, to get the best results  from medicines, always take them as directed. The tips on these pages can help you keep track.  Staying on schedule  Every medicine has a different purpose. So, each one needs to be taken as prescribed. Dont skip pills or stop taking a medicine, even when you feel fine. To stay on track try to:    Take your medicine at set times. You could take it each morning with breakfast or right before you go to bed. Some medicines may need to be taken at certain times of the day, or with food. Ask your doctor if this is the case for any of your medicines.    Find ways to remind yourself to take medicine. Use a pillbox or organize pills for the week. Set your watch or cell phone alarm to go off when youre supposed to take your medicine. Or, put a note on the bathroom mirror to remind yourself.    Have your prescriptions refilled while you still have plenty of pills left. Keep in mind that certain suppliers, such as mail order pharmacies, may take longer to fill prescriptions.    When traveling, keep all medicines in your carry-on bag. This way youll have them in case you and your checked luggage get . Also, bring copies of each of your prescriptions when you travel.  Safety tips  Read the warning labels and usage instructions for each medicine you take. Also, keep these safety tips in mind:    Get help organizing your pills if you need it. Taking more than one medicine can be confusing. A family member or friend can help prevent you from making a mistake that could be dangerous to your health.    Fill all your prescriptions at the same drug store. This way, your records are all in one place.    Ask your pharmacist or doctor for a fact sheet or other patient information when you start a new medicine.    Tell your doctor and pharmacist if you have allergies to any medicine.    Dont split your pills to save money. Talk to your doctor if youre having trouble paying for your medicine.    Never share medicine with  anyone.    Ask your pharmacy how you should dispose of old or  medicine.    Give a copy of your medicine list to a family member or close friend. Hold copies of each others lists in case of emergency.    Store medicines in a cool, dry, dark place - not in a steamy bathroom.    Make sure you tell your healthcare providers if you are taking any other supplements or drugs over-the-counter.  If you have side effects  Some medicines can cause side effects, such as nausea or dizziness. Tell your doctor if you have any side effects. He or she may change the dosage or schedule to reduce effects. Be sure to keep taking your medicine as directed, and always talk to your healthcare team about how you feel. Your feedback will help the doctor find the best medicine plan for you.  Know the Medications YoureTaking   You should know certain details about your medications. This helps you take them correctly and safely. For each medicine, ask your doctor or pharmacist the questions below. Write down the answers so you dont forget. Then fill in the information on your medication list. Also, ask about anything you dont understand or that seems wrong. For instance, if you get a refill and the pills dont look like the ones from last time, talk to the pharmacist before taking them.  Questions to ask     What is the medication's name? Find out the brand name as well as the generic name, if any.    Why am I taking this? What does it do? How fast will it work?    How often should I take this? At what time of day?    How much of the medication (what dosage) should I take? How many pills is that?    What should I do if I miss a dose? What are some of the symptoms that may occur if I miss a dose?    Should I expect any side effects from this medication? What should I do if I have them?    Do I follow any special instructions while taking this? Are there any activities, foods, or other medicines I should avoid while taking this?    How  long should I keep taking this? When I run out, should I order more?    How often should I come in to have this medication monitored?  Beware of medication interactions  Vitamins, herbal supplements, and some over-the-counter drugs can be dangerous to take if you use heart medications. So tell your doctor about all products youre taking. This includes even simple remedies for headaches, allergies, colds, or constipation. Show your medication list to the pharmacist every time you buy prescription or over-the-counter medication. They can tell you which drugs to avoid. Also, drinking alcohol while taking heart medication can be dangerous.          All [facility] clinic patients have access to a Nurse 24 hours a day, 7 days a week.  If you have questions or want advice from a Nurse, please know [facility] is here for you.  You can call your clinic and they will connect you or you can call [facility] at [phone].  [facility] also has Walk In Care clinics in multiple locations.  Call the number listed above for more information about our Walk In Care clinics or visit the [facility] website at [email or web address].

## 2021-06-29 NOTE — PROGRESS NOTES
Progress Notes by Del Hirsch MD at 10/22/2020  4:10 PM     Author: Del Hirsch MD Service: -- Author Type: Physician    Filed: 10/23/2020  8:39 AM Encounter Date: 10/22/2020 Status: Signed    : Del Hirsch MD (Physician)                                       Thank you Dr. Cardoza for asking the [facility] team to participate in the care of your patient, Kluwant Amin.     Impression and Plan     1. Coronary artery disease. Kulwant has known coronary artery disease. Specifically, patient underwent coronary angiography with PCI with stent placement to proximal-mid left anterior descending coronary artery (2.75×32 and 4.0×8 mm Synergy drug-eluting stents).  ?  Patient underwent nuclear perfusion imaging 1 March 2018 at [facility] which revealed no evidence of infarct or ischemia.  ?  Repeat ischemic work-up involving a regadenoson MRI 24 July 2019 return favorable and revealed no evidence of ischemia (see Cardiac Diagnostic section below).     On interview today, patient reports no chest pain symptoms reminiscent of her angina.     2.  Possible pericarditis.  Patient does, however, report some recurrent discomfort in her chest after stopping anti-inflammatory therapy once again.  This may represent recurrent pericarditis.  I must admit, over, that symptoms are somewhat atypical for pericarditis as well.  In addition, despite the aid of a telephone  is very difficult to get a good sense of the nature of her discomfort.  It may be more musculoskeletal in nature.    We will pursue a trial of nonsteroidal therapy once again and will initiate indomethacin 25 mg twice daily x7 days.    Continue colchicine.     3. SVT. Patient at time of initial consultation by me 27 December 2017 had been reported to have an SVT with heart rate of approximately 200-220 bpm. Patient apparently responded to adenosine. 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 Differential, however, would include Gustavo-Parkinson-White with concealed accessory pathway (no delta waves seen on ECG) with orthodromic reciprocating tachycardia (ORT).  She has had no recurrent similar episodes.  Holter monitor 13 August 2019 was also normal.  ?  4. Hypertension. Blood pressure is quite reasonable in the office today.  ?  5. Dyslipidemia.  Lipid profile 14 March 2019 was favorable with LDL 59 mg/dL and HDL 44 mg/dL.     Plan on follow-up in approximately 2 months.           History of Present Illness    Once again I would like to thank you again for asking me to participate in the care of your patient, Kulwant Amin.  As you know, but to reiterate for my own records, Kulwant Amni is a 50 y.o. female with known coronary artery disease. Specifically, patient underwent coronary angiography 25 January 2018 with PCI with stent placement to proximal-mid left anterior descending coronary artery (2.75×32 and 4.0×8 mm Synergy drug-eluting stents).  ?  Kulwant underwent a nuclear perfusion study 22 January 2019 which was favorable and revealed no evidence of infarct or ischemia with normal ejection fraction of 70%.     More recently, patient was seen by my colleague, Dr. Linh Velasquez on 15 August 2020.  Patient at that time was felt to have probable pericarditis and was started on anti-inflammatory therapy.     Patient seen today with the aid of telephone .  Despite this, review of systems is very difficult.  Patient continues to report some \"burning\" in her chest which waxes and wanes.  At times it seems her discomfort is made worse with certain movements of the torso.  Also reports some worsening with deep breath at times.  Symptoms not necessarily exertionally related.  She states her breathing is comfortable and reports no associated shortness of breath.  She denies any fevers, chills, or other constitutional symptoms.     She " minimizes chest discomfort reminiscent of her angina, however.  She states her breathing is fairly comfortable.  Denies any palpitations or lightheadedness.    Further review of systems is otherwise negative/noncontributory (medical record and 13 point review of systems reviewed as well and pertinent positives noted).         Cardiac Diagnostics      Echocardiogram 14 August 2020 (personally reviewed):  1. Normal left ventricular size and systolic performance with a visually estimated ejection fraction of 55-60%.   2. No significant valvular heart disease is identified on this study.   3. Normal right ventricular size and systolic performance.     Echocardiogram 23 May 2019:  1. Normal left ventricular size and systolic performance with ejection fraction of 65 to 70%.  2. No significant valvular heart disease.  3. Normal right ventricular size and systolic performance.  4. Normal atrial dimensions.    Echocardiogram 25 January 2018:  1. Limited echocardiogram.  2. Normal left ventricular size and systolic performance with ejection fraction of 60%.  3. No pericardial effusion.    Echocardiogram 27 December 2017:  1. Normal left ventricular size and systolic performance with ejection fraction 65-70%.  2. No significant valvular heart disease.  3. Normal right ventricular size and systolic performance  4. Normal atrial dimensions.    Echocardiogram 7 July 2014:  1. Normal left ventricular size and systolic performance with ejection fraction of 55-60%.  2. No significant valvular heart disease.  3. Normal right ventricular size and systolic performance.  4. Normal atrial dimensions.    Regadenoson MRI 24 July 2019:  1. Regadenoson stress cardiac MRI is negative for inducible myocardial ischemia.   2. Regadenoson stress ECG is negative for inducible myocardial ischemia.  3. No previous myocardial infarction is identified.  4. Small left ventricular size, wall thickness and function. The calculated left ventricular  "ejection fraction is 69%.  5. Normal right ventricular size and function.  6. No obvious valvular disease.    Nuclear perfusion imaging study 22 January 2019:  1. No evidence of infarct or ischemia.  2. Normal left ventricular systolic performance with ejection fraction of 70%.    Nuclear perfusion imaging 1 March 2018:  1. No evidence of infarct or ischemia.  2. Normal left ventricular systolic performance with ejection fraction greater than 75%.    Nuclear perfusion imaging 28 December 2017 (pre-coronary angiogram):  1. No evidence of infarct or ischemia.  2. Increased stress to rest E ratio 1.46 possibly representing multivessel disease.  3. Normal left ventricular systolic performance with ejection fraction of 75%.    Coronary angiogram 25 January 2018:  1. Left Main coronary artery: Normal.  2. Left anterior descending coronary artery: Proximal-mid 70% stenosis at diagonal bifurcation with fractional flow reserve of 0.80. 25 January 2018 with  3. Circumflex coronary artery: Mild disease.  4. Right coronary artery: Normal.  5. Status post PCI with stent placement to proximal-mid left anterior descending coronary artery (2.75×32 and 4.0×8 mm Synergy drug-eluting stents).    Holter monitor 13 August 2019:  1. Normal Holter monitor.    Holter monitor 22 January 2019:  2. Normal Holter.    30 day event recorder 20 March 2018 through 17 April 2018:  1. Normal 24-hour Holter monitor.  2. Symptoms correlating with sinus rhythm.    Twelve-lead ECG (personally reviewed) 27 December 2017: Sinus rhythm. ?Normal ECG.          Physical Examination       /70 (Patient Site: Left Arm, Patient Position: Sitting, Cuff Size: Adult Regular)   Pulse 75   Resp 14   Ht 5' 4\" (1.626 m)   Wt 132 lb (59.9 kg)   BMI 22.66 kg/m          Wt Readings from Last 3 Encounters:   10/22/20 132 lb (59.9 kg)   10/06/20 131 lb (59.4 kg)   09/16/20 129 lb 8 oz (58.7 kg)       The patient is alert and oriented times three. Sclerae are " anicteric. Mucosal membranes are moist. Jugular venous pressure is normal. No significant adenopathy/thyromegally appreciated. Lungs are clear with good expansion. On cardiovascular exam, the patient has a regular S1 and S2. Abdomen is soft and non-tender. Extremities reveal no clubbing, cyanosis, or edema.       Family History/Social History/Risk Factors   Patient does not smoke.  Family history reviewed, and family history includes Other in her mother; Ulcers in her father.          Medications  Allergies   Current Outpatient Medications   Medication Sig Dispense Refill   ? acetaminophen (TYLENOL) 500 MG tablet Take 2 tablets (1,000 mg total) by mouth every 8 (eight) hours as needed for pain. 90 tablet 10   ? albuterol (PROVENTIL) 2.5 mg /3 mL (0.083 %) nebulizer solution Take 3 mL (2.5 mg total) by nebulization every 6 (six) hours as needed for wheezing. 150 mL 12   ? albuterol (VENTOLIN HFA) 90 mcg/actuation inhaler Inhale 2 puffs every 6 (six) hours as needed for wheezing. 1 Inhaler 12   ? alum/mag hydrox-simethicone-diphenhydramine-lidocaine (MAGIC MOUTHWASH) suspension Swish and swallow 15 mL 4 (four) times a day as needed. 240 mL 2   ? amitriptyline (ELAVIL) 10 MG tablet TAKE 2 TABLETS (20 MG TOTAL) BY MOUTH AT BEDTIME. 180 tablet 3   ? aspirin 81 MG EC tablet Take 1 tablet (81 mg total) by mouth daily. 30 tablet 11   ? atorvastatin (LIPITOR) 80 MG tablet Take 1 tablet (80 mg total) by mouth at bedtime. 90 tablet 3   ? blood glucose meter (GLUCOMETER) Use 1 each As Directed 3 (three) times a day. Dispense glucometer brand per patient's insurance at pharmacy discretion.(E11.9) 1 each 0   ? blood glucose test strips Use 1 each As Directed 3 (three) times a day. Dispense brand per patient's insurance at pharmacy discretion.(E11.9) 300 strip 3   ? clopidogreL (PLAVIX) 75 mg tablet TAKE 1 TABLET (75 MG TOTAL) BY MOUTH DAILY. 90 tablet 1   ? colchicine 0.6 mg tablet Take 1 tablet (0.6 mg total) by mouth 2 (two)  times a day. 180 tablet 0   ? colchicine 0.6 mg tablet Take 1 tablet (0.6 mg total) by mouth daily. 60 tablet 0   ? ferrous sulfate 325 (65 FE) MG tablet Take 1 tablet (325 mg total) by mouth daily with breakfast. 90 tablet 3   ? fluticasone furoate-vilanteroL (BREO ELLIPTA) 200-25 mcg/dose DsDv inhaler Inhale 1 puff daily. 1 each 12   ? gabapentin (NEURONTIN) 300 MG capsule Take 1 capsule (300 mg total) by mouth 2 (two) times a day. 60 capsule 5   ? generic lancets Use 1 each As Directed 3 (three) times a day. Dispense brand per patient's insurance at pharmacy discretion.(E11.9) 300 each 3   ? guaiFENesin (ROBITUSSIN) 100 mg/5 mL syrup Take 10 mL (200 mg total) by mouth 3 (three) times a day as needed for cough. 200 mL 0   ? hydroCHLOROthiazide (MICROZIDE) 12.5 mg capsule TAKE 1 CAPSULE (12.5 MG TOTAL) BY MOUTH DAILY FOR BLOOD PRESSURE 90 capsule 2   ? INJECT EASE LANCETS 30 gauge Misc USE 1 EACH AS DIRECTED THREE TIMES A DAY *34 DAYS* 100 each 11   ? insulin aspart U-100 (NOVOLOG) 100 unit/mL injection Inject under the skin 3 (three) times a day with meals. Use per sliding scale < 150 - No insulin, 151-200 use 3 U, 201- 250 use 5 U,   251- 300 use 8 U      ? metFORMIN (GLUCOPHAGE) 500 MG tablet TAKE 1 TABLET (500 MG TOTAL) BY MOUTH 2 TIMES A DAY WITH MEALS FOR DIABETES 180 tablet 2   ? metoprolol tartrate (LOPRESSOR) 25 MG tablet Take 25 mg by mouth 2 (two) times a day.     ? montelukast (SINGULAIR) 10 mg tablet TAKE 1 PILL (10 MG TOTAL) BY MOUTH AT BEDTIME FOR ASTHMA 30 tablet 10   ? omeprazole (PRILOSEC) 20 MG capsule Take 2 capsules (40 mg total) by mouth daily before breakfast. 30 capsule 12   ? ondansetron (ZOFRAN-ODT) 4 MG disintegrating tablet Take 1 tablet (4 mg total) by mouth every 8 (eight) hours as needed for nausea. 12 tablet 0   ? pediatric multivitamin (POLY-VI-SOL) 1,500- unit-mg-unit/mL Drop drops Take 1 mL by mouth daily. 30 mL 0   ? polyethylene glycol (GLYCOLAX) 17 gram/dose powder MIX 17  GRAMS WITH LIQUID AND DRINK ONCE DAILY. Hold for loose stools. 510 g 12   ? predniSONE (DELTASONE) 10 mg tablet Take 4 tabs (40mg total) daily x 5 days,  THEN 1/2 tab (5mg) daily.. 32 tablet 0   ? sucralfate (CARAFATE) 1 gram tablet Take 1 tablet (1 g total) by mouth 4 (four) times a day before meals and at bedtime. Take 1 hour prior to meals 120 tablet 1   ? tiotropium (SPIRIVA) 18 mcg inhalation capsule Place 1 capsule (2 puffs total) into inhaler and inhale daily. 30 capsule 11   ? witch hazeL (TUCKS, WITCH HAZEL,) 50 % PadM Apply  to the rectal area as needed for pain. 1 each 0   ? colchicine 0.6 mg tablet Take 1 tablet (0.6 mg total) by mouth daily. 90 tablet 0   ? indomethacin (INDOCIN) 25 MG capsule Take 1 capsule (25 mg total) by mouth 2 (two) times a day with meals. 14 capsule 0     No current facility-administered medications for this visit.       No Known Allergies       Lab Results   Lab Results   Component Value Date     08/21/2020    K 3.8 08/22/2020     (H) 08/21/2020    CO2 19 (L) 08/21/2020    BUN 17 08/21/2020    CREATININE 0.68 08/21/2020    CALCIUM 7.9 (L) 08/21/2020     Lab Results   Component Value Date    WBC 10.9 08/21/2020    HGB 10.2 (L) 08/21/2020    HCT 33.3 (L) 08/21/2020    MCV 87 08/21/2020     08/21/2020     Lab Results   Component Value Date    CHOL 128 06/24/2020    TRIG 175 (H) 06/24/2020    HDL 45 (L) 06/24/2020    LDLCALC 48 06/24/2020    LDLDIRECT 57 06/20/2018     Lab Results   Component Value Date    INR 0.96 01/06/2020     Lab Results   Component Value Date    BNP 62 08/19/2020     Lab Results   Component Value Date    TROPONINI <0.01 08/19/2020    TROPONINI <0.01 08/14/2020    TROPONINI <0.01 08/13/2020     Lab Results   Component Value Date    TSH 1.10 10/05/2018

## 2021-06-29 NOTE — PROGRESS NOTES
Progress Notes by Del Hirsch MD at 9/11/2020  9:10 AM     Author: Del Hirsch MD Service: -- Author Type: Physician    Filed: 9/11/2020  9:42 AM Encounter Date: 9/11/2020 Status: Signed    : Del Hirsch MD (Physician)                                       Thank you Dr. Cardoza for asking the [facility] team to participate in the care of your patient, Kulwant Amin.     Impression and Plan      1. Coronary artery disease. Kulwant has known coronary artery disease. Specifically, patient underwent coronary angiography with PCI with stent placement to proximal-mid left anterior descending coronary artery (2.75×32 and 4.0×8 mm Synergy drug-eluting stents).  ?  Patient underwent nuclear perfusion imaging 1 March 2018 at [facility] which revealed no evidence of infarct or ischemia.  ?  Repeat ischemic work-up involving a regadenoson MRI 24 July 2019 return favorable and revealed no evidence of ischemia (see Cardiac Diagnostic section below).     On interview today, patient reports no chest pain symptoms reminiscent of her angina.    2.  Possible pericarditis.  Patient does report some recurrent discomfort in her chest after stopping anti-inflammatory therapy.  This may represent recurrent pericarditis.      Trial of ibuprofen 200 mg 3 times daily x5 days    Continue colchicine.     3. SVT. Patient at time of initial consultation by me 27 December 2017 had been reported to have an SVT with heart rate of approximately 200-220 bpm. Patient apparently responded to adenosine. 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 Differential, however, would include Gustavo-Parkinson-White with concealed accessory pathway (no delta waves seen on ECG) with orthodromic reciprocating tachycardia (ORT).  She has had no recurrent similar episodes.  Recent Holter monitor 13 August 2019 was also normal.  ?  4. Hypertension.  "Blood pressure is quite reasonable in the office today.  ?  5. Dyslipidemia.  Lipid profile 14 March 2019 was favorable with LDL 59 mg/dL and HDL 44 mg/dL.     Plan on follow-up in 1 year.            History of Present Illness    Once again I would like to thank you again for asking me to participate in the care of your patient, Kulwant Amin.  As you know, but to reiterate for my own records, Kulwant Amin is a 50 y.o. female with known coronary artery disease. Specifically, patient underwent coronary angiography 25 January 2018 with PCI with stent placement to proximal-mid left anterior descending coronary artery (2.75×32 and 4.0×8 mm Synergy drug-eluting stents).  ?  Patient most recently underwent a nuclear perfusion study 22 January 2019 which was favorable and revealed no evidence of infarct or ischemia with normal ejection fraction of 70%.    More recently, patient was seen by my colleague, Dr. Linh Velasquez on 15 August 2020.  Patient at that time was felt to have probable pericarditis and was started on anti-inflammatory therapy.    Patient states that since she had her ibuprofen discontinued, she has had some increasing \"burning\" once again.  She states it is somewhat worse when she takes a deep breath.  Movement of the torso also, however, seems to exacerbate her pain.    She minimizes chest discomfort reminiscent of her angina, however.  She states her breathing is fairly comfortable.  Denies any palpitations or lightheadedness.      Further review of systems is otherwise negative/noncontributory (medical record and 13 point review of systems reviewed as well and pertinent positives noted).         Cardiac Diagnostics      Echocardiogram 14 August 2020 (personally reviewed):  1. Normal left ventricular size and systolic performance with a visually estimated ejection fraction of 55-60%.   2. No significant valvular heart disease is identified on this study.   3. Normal right ventricular size and " systolic performance.     Echocardiogram 23 May 2019:  1. Normal left ventricular size and systolic performance with ejection fraction of 65 to 70%.  2. No significant valvular heart disease.  3. Normal right ventricular size and systolic performance.  4. Normal atrial dimensions.    Echocardiogram 25 January 2018:  1. Limited echocardiogram.  2. Normal left ventricular size and systolic performance with ejection fraction of 60%.  3. No pericardial effusion.    Echocardiogram 27 December 2017:  1. Normal left ventricular size and systolic performance with ejection fraction 65-70%.  2. No significant valvular heart disease.  3. Normal right ventricular size and systolic performance  4. Normal atrial dimensions.    Echocardiogram 7 July 2014:  1. Normal left ventricular size and systolic performance with ejection fraction of 55-60%.  2. No significant valvular heart disease.  3. Normal right ventricular size and systolic performance.  4. Normal atrial dimensions.    Regadenoson MRI 24 July 2019:  1. Regadenoson stress cardiac MRI is negative for inducible myocardial ischemia.   2. Regadenoson stress ECG is negative for inducible myocardial ischemia.  3. No previous myocardial infarction is identified.  4. Small left ventricular size, wall thickness and function. The calculated left ventricular ejection fraction is 69%.  5. Normal right ventricular size and function.  6. No obvious valvular disease.    Nuclear perfusion imaging study 22 January 2019:  1. No evidence of infarct or ischemia.  2. Normal left ventricular systolic performance with ejection fraction of 70%.    Nuclear perfusion imaging 1 March 2018:  1. No evidence of infarct or ischemia.  2. Normal left ventricular systolic performance with ejection fraction greater than 75%.    Nuclear perfusion imaging 28 December 2017 (pre-coronary angiogram):  1. No evidence of infarct or ischemia.  2. Increased stress to rest E ratio 1.46 possibly representing  "multivessel disease.  3. Normal left ventricular systolic performance with ejection fraction of 75%.    Coronary angiogram 25 January 2018:  1. Left Main coronary artery: Normal.  2. Left anterior descending coronary artery: Proximal-mid 70% stenosis at diagonal bifurcation with fractional flow reserve of 0.80. 25 January 2018 with  3. Circumflex coronary artery: Mild disease.  4. Right coronary artery: Normal.  5. Status post PCI with stent placement to proximal-mid left anterior descending coronary artery (2.75×32 and 4.0×8 mm Synergy drug-eluting stents).    Holter monitor 13 August 2019:  1. A normal Holter monitor.    Holter monitor 22 January 2019:  2. Normal Holter.    30 day event recorder 20 March 2018 through 17 April 2018:  1. Normal 24-hour Holter monitor.  2. Symptoms correlating with sinus rhythm.    Twelve-lead ECG (personally reviewed) 27 December 2017: Sinus rhythm. ?Normal ECG.              Physical Examination       /70 (Patient Site: Right Arm, Patient Position: Sitting, Cuff Size: Adult Regular)   Pulse 76   Resp 16   Ht 5' 4\" (1.626 m)   Wt 130 lb (59 kg)   BMI 22.31 kg/m          Wt Readings from Last 3 Encounters:   09/11/20 130 lb (59 kg)   09/04/20 129 lb 1 oz (58.5 kg)   08/28/20 127 lb 2 oz (57.7 kg)     The patient is alert and oriented times three. Sclerae are anicteric. Mucosal membranes are moist. Jugular venous pressure is normal. No significant adenopathy/thyromegally appreciated. Lungs are clear with good expansion. On cardiovascular exam, the patient has a regular S1 and S2.  No rub is appreciated.  Abdomen is soft and non-tender. Extremities reveal no clubbing, cyanosis, or edema.           Family History/Social History/Risk Factors   Patient does not smoke.  Family history reviewed, and family history includes Other in her mother; Ulcers in her father.          Medications  Allergies   Current Outpatient Medications   Medication Sig Dispense Refill   ? acetaminophen " (TYLENOL) 500 MG tablet Take 2 tablets (1,000 mg total) by mouth every 8 (eight) hours as needed for pain. 90 tablet 10   ? albuterol (PROVENTIL) 2.5 mg /3 mL (0.083 %) nebulizer solution Take 3 mL (2.5 mg total) by nebulization every 6 (six) hours as needed for wheezing. 150 mL 12   ? albuterol (VENTOLIN HFA) 90 mcg/actuation inhaler Inhale 2 puffs every 6 (six) hours as needed for wheezing. 1 Inhaler 12   ? alum/mag hydrox-simethicone-diphenhydramine-lidocaine (MAGIC MOUTHWASH) suspension Swish and swallow 15 mL 4 (four) times a day as needed. 120 mL 0   ? amitriptyline (ELAVIL) 10 MG tablet TAKE 2 TABLETS (20 MG TOTAL) BY MOUTH AT BEDTIME. 180 tablet 3   ? aspirin 81 MG EC tablet Take 1 tablet (81 mg total) by mouth daily. 30 tablet 11   ? atorvastatin (LIPITOR) 80 MG tablet Take 1 tablet (80 mg total) by mouth at bedtime. 90 tablet 3   ? blood glucose meter (GLUCOMETER) Use 1 each As Directed 3 (three) times a day. Dispense glucometer brand per patient's insurance at pharmacy discretion.(E11.9) 1 each 0   ? blood glucose test strips Use 1 each As Directed 3 (three) times a day. Dispense brand per patient's insurance at pharmacy discretion.(E11.9) 300 strip 3   ? clopidogreL (PLAVIX) 75 mg tablet TAKE 1 TABLET (75 MG TOTAL) BY MOUTH DAILY. 90 tablet 1   ? colchicine 0.6 mg tablet Take 1 tablet (0.6 mg total) by mouth 2 (two) times a day. 180 tablet 0   ? ferrous sulfate 325 (65 FE) MG tablet Take 1 tablet (325 mg total) by mouth daily with breakfast. 90 tablet 3   ? fluticasone furoate-vilanterol (BREO ELLIPTA) 200-25 mcg/dose DsDv inhaler Inhale 1 puff daily. 1 each 12   ? gabapentin (NEURONTIN) 300 MG capsule Take 1 capsule (300 mg total) by mouth 2 (two) times a day. 60 capsule 5   ? generic lancets Use 1 each As Directed 3 (three) times a day. Dispense brand per patient's insurance at pharmacy discretion.(E11.9) 300 each 3   ? guaiFENesin (ROBITUSSIN) 100 mg/5 mL syrup Take 10 mL (200 mg total) by mouth 3  (three) times a day as needed for cough. 200 mL 0   ? hydroCHLOROthiazide (MICROZIDE) 12.5 mg capsule TAKE 1 CAPSULE (12.5 MG TOTAL) BY MOUTH DAILY FOR BLOOD PRESSURE 90 capsule 2   ? INJECT EASE LANCETS 30 gauge Misc USE 1 EACH AS DIRECTED THREE TIMES A DAY *34 DAYS* 100 each 11   ? insulin aspart U-100 (NOVOLOG) 100 unit/mL injection Inject under the skin 3 (three) times a day with meals. Use per sliding scale < 150 - No insulin, 151-200 use 3 U, 201- 250 use 5 U,   251- 300 use 8 U      ? metFORMIN (GLUCOPHAGE) 500 MG tablet TAKE 1 TABLET (500 MG TOTAL) BY MOUTH 2 TIMES A DAY WITH MEALS FOR DIABETES 180 tablet 2   ? metoprolol tartrate (LOPRESSOR) 25 MG tablet Take 25 mg by mouth 2 (two) times a day.     ? montelukast (SINGULAIR) 10 mg tablet TAKE 1 PILL (10 MG TOTAL) BY MOUTH AT BEDTIME FOR ASTHMA 30 tablet 10   ? omeprazole (PRILOSEC) 20 MG capsule Take 2 capsules (40 mg total) by mouth daily before breakfast. 30 capsule 12   ? ondansetron (ZOFRAN-ODT) 4 MG disintegrating tablet Take 1 tablet (4 mg total) by mouth every 8 (eight) hours as needed for nausea. 12 tablet 0   ? pediatric multivitamin (POLY-VI-SOL) 1,500- unit-mg-unit/mL Drop drops Take 1 mL by mouth daily. 30 mL 0   ? polyethylene glycol (GLYCOLAX) 17 gram/dose powder MIX 17 GRAMS WITH LIQUID AND DRINK ONCE DAILY. Hold for loose stools. 510 g 12   ? predniSONE (DELTASONE) 5 MG tablet Take 20 mg by mouth daily with breakfast for 3 days, THEN 10 mg daily with breakfast for 3 days, THEN 5 mg daily with breakfast. 48 tablet 0   ? sucralfate (CARAFATE) 1 gram tablet Take 1 tablet (1 g total) by mouth 4 (four) times a day before meals and at bedtime. Take 1 hour prior to meals 120 tablet 1   ? tiotropium (SPIRIVA) 18 mcg inhalation capsule Place 1 capsule (2 puffs total) into inhaler and inhale daily. 30 capsule 11   ? witch hazeL (TUCKS, WITCH HAZEL,) 50 % PadM Apply  to the rectal area as needed for pain. 1 each 0   ? colchicine 0.6 mg tablet Take  1 tablet (0.6 mg total) by mouth daily. 60 tablet 0   ? ibuprofen (ADVIL,MOTRIN) 200 MG tablet Take 1 tablet (200 mg total) by mouth 3 (three) times a day for 5 days. 15 tablet 0     No current facility-administered medications for this visit.       No Known Allergies       Lab Results   Lab Results   Component Value Date     08/21/2020    K 3.8 08/22/2020     (H) 08/21/2020    CO2 19 (L) 08/21/2020    BUN 17 08/21/2020    CREATININE 0.68 08/21/2020    CALCIUM 7.9 (L) 08/21/2020     Lab Results   Component Value Date    WBC 10.9 08/21/2020    HGB 10.2 (L) 08/21/2020    HCT 33.3 (L) 08/21/2020    MCV 87 08/21/2020     08/21/2020     Lab Results   Component Value Date    CHOL 128 06/24/2020    TRIG 175 (H) 06/24/2020    HDL 45 (L) 06/24/2020    LDLCALC 48 06/24/2020    LDLDIRECT 57 06/20/2018     Lab Results   Component Value Date    INR 0.96 01/06/2020     Lab Results   Component Value Date    BNP 62 08/19/2020     Lab Results   Component Value Date    TROPONINI <0.01 08/19/2020    TROPONINI <0.01 08/14/2020    TROPONINI <0.01 08/13/2020     Lab Results   Component Value Date    TSH 1.10 10/05/2018

## 2021-06-30 NOTE — PROGRESS NOTES
Progress Notes by Del Hirsch MD at 2/9/2021 12:50 PM     Author: Del Hirsch MD Service: -- Author Type: Physician    Filed: 2/9/2021  1:34 PM Encounter Date: 2/9/2021 Status: Signed    : Del Hirsch MD (Physician)                                       Thank you Dr. Cardoza for asking the [facility] team to participate in the care of your patient, Kulwant Amin.     Impression and Plan     1. Coronary artery disease. Kulwant has known coronary artery disease. Specifically, patient underwent coronary angiography with PCI with stent placement to proximal-mid left anterior descending coronary artery (2.75×32 and 4.0×8 mm Synergy drug-eluting stents).  ?  Patient underwent nuclear perfusion imaging 1 March 2018 at [facility] which revealed no evidence of infarct or ischemia.  ?  Repeat ischemic work-up involving a regadenoson MRI 24 July 2019 return favorable and revealed no evidence of ischemia (see Cardiac Diagnostic section below).     On interview today, patient reports no chest pain symptoms reminiscent of her angina.    2.  Possible pericarditis.  Patient reports subjective improvement.  She is comfortable with how she is performing at this time.     3. SVT. Patient at time of initial consultation by me 27 December 2017 had been reported to have an SVT with heart rate of approximately 200-220 bpm. Patient apparently responded to adenosine. 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 Differential, however, would include Gustavo-Parkinson-White with concealed accessory pathway (no delta waves seen on ECG) with orthodromic reciprocating tachycardia (ORT).  She has had no recurrent similar episodes.  Holter monitor 13 August 2019 was also normal.  ?  4. Hypertension. Blood pressure is reasonable in the office today at 114/72 mmHg.  ?  5. Dyslipidemia.  Lipid profile 14 January 2021 was favorable  "with LDL 52 mg/dL and HDL 39 mg/dL.     Plan on follow-up in approximately 1 year       25 minutes spent reviewing prior records (including documentation, laboratory studies, cardiac testing/imaging), interview with patient along with physical exam, planning, and subsequent documentation/crafting of note.           History of Present Illness    Once again I would like to thank you again for asking me to participate in the care of your patient, Kulwant Amin.  As you know, but to reiterate for my own records, Kulwant Amin is a 51 y.o. female with known coronary artery disease. Specifically, patient underwent coronary angiography 25 January 2018 with PCI with stent placement to proximal-mid left anterior descending coronary artery (2.75×32 and 4.0×8 mm Synergy drug-eluting stents).  ?  Kulwant underwent a nuclear perfusion study 22 January 2019 which was favorable and revealed no evidence of infarct or ischemia with normal ejection fraction of 70%.     More recently, patient was seen by my colleague, Dr. Linh Velasquez on 15 August 2020.  Patient at that time was felt to have probable pericarditis and was started on anti-inflammatory therapy.     Patient seen today with the aid of telephone .  On interview today, she states that in general she feels improved.  She has some occasional chest \"burning\", but minimizes this and reports that it seems improved since last visit.  She denies any exertional type chest discomfort.  Her breathing has been fairly comfortable.  She reports no palpitations or lightheadedness.  Denies any fevers, chills, or other constitutional symptoms.    Further review of systems is otherwise negative/noncontributory (medical record and 13 point review of systems reviewed as well and pertinent positives noted).         Cardiac Diagnostics      Echocardiogram 14 January 2021:  1. Normal left ventricular size and systolic performance with ejection fraction of 55 to 60%.  2. No " significant valvular heart disease.  3. Normal right ventricular size and systolic performance.  4. Normal atrial dimensions.  o When compared to prior echocardiogram dated 14 August 2020, no significant change.    Echocardiogram 14 August 2020 (personally reviewed):  1. Normal left ventricular size and systolic performance with a visually estimated ejection fraction of 55-60%.   2. No significant valvular heart disease is identified on this study.   3. Normal right ventricular size and systolic performance.     Echocardiogram 23 May 2019:  1. Normal left ventricular size and systolic performance with ejection fraction of 65 to 70%.  2. No significant valvular heart disease.  3. Normal right ventricular size and systolic performance.  4. Normal atrial dimensions.    Echocardiogram 25 January 2018:  1. Limited echocardiogram.  2. Normal left ventricular size and systolic performance with ejection fraction of 60%.  3. No pericardial effusion.    Echocardiogram 27 December 2017:  1. Normal left ventricular size and systolic performance with ejection fraction 65-70%.  2. No significant valvular heart disease.  3. Normal right ventricular size and systolic performance  4. Normal atrial dimensions.    Echocardiogram 7 July 2014:  1. Normal left ventricular size and systolic performance with ejection fraction of 55-60%.  2. No significant valvular heart disease.  3. Normal right ventricular size and systolic performance.  4. Normal atrial dimensions.    Regadenoson MRI 24 July 2019:  1. Regadenoson stress cardiac MRI is negative for inducible myocardial ischemia.   2. Regadenoson stress ECG is negative for inducible myocardial ischemia.  3. No previous myocardial infarction is identified.  4. Small left ventricular size, wall thickness and function. The calculated left ventricular ejection fraction is 69%.  5. Normal right ventricular size and function.  6. No obvious valvular disease.    Nuclear perfusion imaging study 22  January 2019:  1. No evidence of infarct or ischemia.  2. Normal left ventricular systolic performance with ejection fraction of 70%.    Nuclear perfusion imaging 1 March 2018:  1. No evidence of infarct or ischemia.  2. Normal left ventricular systolic performance with ejection fraction greater than 75%.    Nuclear perfusion imaging 28 December 2017 (pre-coronary angiogram):  1. No evidence of infarct or ischemia.  2. Increased stress to rest E ratio 1.46 possibly representing multivessel disease.  3. Normal left ventricular systolic performance with ejection fraction of 75%.    Coronary angiogram 25 January 2018:  1. Left Main coronary artery: Normal.  2. Left anterior descending coronary artery: Proximal-mid 70% stenosis at diagonal bifurcation with fractional flow reserve of 0.80. 25 January 2018 with  3. Circumflex coronary artery: Mild disease.  4. Right coronary artery: Normal.  5. Status post PCI with stent placement to proximal-mid left anterior descending coronary artery (2.75×32 and 4.0×8 mm Synergy drug-eluting stents).    Holter monitor 13 August 2019:  1. Normal Holter monitor.    Holter monitor 22 January 2019:  2. Normal Holter.    30 day event recorder 20 March 2018 through 17 April 2018:  Normal 24-hour Holter monitor.  3. Symptoms correlating with sinus rhythm.    Twelve-lead ECG (personally reviewed) 27 December 2017: Sinus rhythm. ?Normal ECG.             Physical Examination       /72 (Patient Site: Left Arm, Patient Position: Sitting, Cuff Size: Adult Regular)   Pulse 67   Resp 16   Ht 5' (1.524 m)   Wt 132 lb (59.9 kg) Comment: With shoes.  SpO2 99%   BMI 25.78 kg/m          Wt Readings from Last 3 Encounters:   02/09/21 132 lb (59.9 kg)   01/18/21 132 lb 12 oz (60.2 kg)   01/14/21 135 lb 3.2 oz (61.3 kg)       The patient is alert and oriented times three. Sclerae are anicteric. Mucosal membranes are moist. Jugular venous pressure is normal. No significant adenopathy/thyromegally  "appreciated. Lungs are clear with good expansion. On cardiovascular exam, the patient has a regular S1 and S2. Abdomen is soft and non-tender. Extremities reveal no clubbing, cyanosis, or edema.         Imaging     Chest radiograph 7 December 2020:  1. There is minimal opacity at the left lateral lung base consistent with atelectasis or small pleural effusion.   2. No focal consolidation, pneumothorax or mass.   3. Heart size and pulmonary vascularity are normal.   4. No displaced bony fracture               Family History/Social History/Risk Factors   Patient does not smoke.  Family history reviewed, and family history includes Other in her mother; Ulcers in her father.          Medications  Allergies   Current Outpatient Medications   Medication Sig Dispense Refill   ? acetaminophen (TYLENOL) 500 MG tablet Take 2 tablets (1,000 mg total) by mouth every 8 (eight) hours as needed for pain. 90 tablet 10   ? albuterol (PROVENTIL) 2.5 mg /3 mL (0.083 %) nebulizer solution Take 3 mL (2.5 mg total) by nebulization every 6 (six) hours as needed for wheezing. 150 mL 12   ? albuterol (VENTOLIN HFA) 90 mcg/actuation inhaler Inhale 2 puffs every 6 (six) hours as needed for wheezing. 1 Inhaler 12   ? amitriptyline (ELAVIL) 10 MG tablet TAKE 2 TABLETS (20 MG TOTAL) BY MOUTH AT BEDTIME. 180 tablet 3   ? aspirin 325 MG tablet Take 1 tablet (325 mg total) by mouth daily with breakfast. 30 tablet 11   ? atorvastatin (LIPITOR) 80 MG tablet TAKE 1 TABLET (80 MG TOTAL) BY MOUTH AT BEDTIME FOR CHOLESTEROL 30 tablet 3   ? BD ULTRA-FINE SHORT PEN NEEDLE 31 gauge x 5/16\" Ndle see administration instructions.     ? blood glucose meter (GLUCOMETER) Use 1 each As Directed 3 (three) times a day. Dispense glucometer brand per patient's insurance at pharmacy discretion.(E11.9) 1 each 0   ? blood glucose test strips Use 1 each As Directed 3 (three) times a day. Dispense brand per patient's insurance at pharmacy discretion.(E11.9) 300 strip 3   ? " "clopidogreL (PLAVIX) 75 mg tablet TAKE 1 TABLET (75 MG TOTAL) BY MOUTH DAILY. 90 tablet 1   ? colchicine 0.6 mg tablet TAKE 1 TABLET (0.6 MG TOTAL) BY MOUTH DAILY FOR GOUT 30 tablet 0   ? ferrous sulfate 325 (65 FE) MG tablet Take 1 tablet (325 mg total) by mouth daily with breakfast. 90 tablet 3   ? flash glucose scanning reader (FREESTYLE MONICA 14 DAY READER) Misc Use 1 Units As Directed every 8 (eight) hours. 1 each 0   ? flash glucose sensor (FREESTYLE MONICA 14 DAY SENSOR) Kit Use 1 Units As Directed every 14 (fourteen) days. 6 kit 3   ? fluticasone furoate-vilanteroL (BREO ELLIPTA) 200-25 mcg/dose DsDv inhaler Inhale 1 puff daily. 1 each 12   ? gabapentin (NEURONTIN) 300 MG capsule TAKE 1 CAPSULE (300 MG TOTAL) BY MOUTH 2 (TWO) TIMES A DAY FOR NERVE PAIN 180 capsule 3   ? generic lancets Use 1 each As Directed 3 (three) times a day. Dispense brand per patient's insurance at pharmacy discretion.(E11.9) 300 each 3   ? hydroCHLOROthiazide (MICROZIDE) 12.5 mg capsule TAKE 1 CAPSULE (12.5 MG TOTAL) BY MOUTH DAILY FOR BLOOD PRESSURE 90 capsule 2   ? INJECT EASE LANCETS 30 gauge [facility] USE 1 EACH AS DIRECTED THREE TIMES A DAY *34 DAYS* 100 each 11   ? insulin aspart U-100 (NOVOLOG) 100 unit/mL injection Inject under the skin 3 (three) times a day with meals. Use per sliding scale < 150 - No insulin, 151-200 use 3 U, 201- 250 use 5 U,   251- 300 use 8 U      ? insulin syringe-needle U-100 0.3 mL 30 x 3/8\" Syrg Use as needed sliding scale insulin as directed. 100 Syringe 0   ? meclizine (ANTIVERT) 25 mg tablet Take 1 tablet (25 mg total) by mouth 3 (three) times a day as needed for dizziness. 28 tablet 0   ? metFORMIN (GLUCOPHAGE) 850 MG tablet Take 1 tablet (850 mg total) by mouth 2 (two) times a day with meals. 60 tablet 11   ? metoprolol tartrate (LOPRESSOR) 25 MG tablet TAKE 1 TABLET (25 MG TOTAL) BY MOUTH 2 (TWO) TIMES A DAY FOR BLOOD PRESSURE 60 tablet 3   ? montelukast (SINGULAIR) 10 mg tablet TAKE 1 PILL (10 MG " TOTAL) BY MOUTH AT BEDTIME FOR ASTHMA 30 tablet 10   ? omeprazole (PRILOSEC) 20 MG capsule Take 2 capsules (40 mg total) by mouth daily before breakfast. 30 capsule 12   ? polyethylene glycol (GLYCOLAX) 17 gram/dose powder MIX 17 GRAMS WITH LIQUID AND DRINK ONCE DAILY. Hold for loose stools. 510 g 12   ? sucralfate (CARAFATE) 1 gram tablet Take 1 tablet (1 g total) by mouth 4 (four) times a day before meals and at bedtime. Take 1 hour prior to meals 120 tablet 1   ? tiotropium (SPIRIVA) 18 mcg inhalation capsule Place 1 capsule (2 puffs total) into inhaler and inhale daily. Place 1 capsule into the inhaler device. Close and press button to crush the capsule. Inhale the contents of the crushed capsule in 2 breaths. 30 capsule 11     No current facility-administered medications for this visit.       No Known Allergies       Lab Results   Lab Results   Component Value Date     01/14/2021    K 3.7 01/14/2021     01/14/2021    CO2 23 01/14/2021    BUN 6 (L) 01/14/2021    CREATININE 0.65 01/14/2021    CALCIUM 8.9 01/14/2021     Lab Results   Component Value Date    WBC 7.0 01/13/2021    HGB 13.4 01/13/2021    HCT 42.2 01/13/2021    MCV 89 01/13/2021     01/13/2021     Lab Results   Component Value Date    CHOL 116 01/14/2021    TRIG 124 01/14/2021    HDL 39 (L) 01/14/2021    LDLCALC 52 01/14/2021    LDLDIRECT 57 06/20/2018     Lab Results   Component Value Date    INR 0.96 01/06/2020     Lab Results   Component Value Date    BNP 17 12/07/2020     Lab Results   Component Value Date    TROPONINI <0.01 12/07/2020    TROPONINI <0.01 08/19/2020    TROPONINI <0.01 08/14/2020     Lab Results   Component Value Date    TSH 0.76 01/14/2021

## 2021-07-03 NOTE — ADDENDUM NOTE
Addendum Note by Simone Carlton at 1/5/2021  3:20 PM     Author: Simone Carlton Service: -- Author Type:     Filed: 1/6/2021  8:28 AM Encounter Date: 1/5/2021 Status: Signed    : Simone Carlton ()    Addended by: SIMONE BOSTON on: 1/6/2021 08:28 AM        Modules accepted: Orders

## 2021-07-03 NOTE — ADDENDUM NOTE
Addendum Note by Enedelia Pinto RN at 7/3/2019 11:30 AM     Author: Enedelia Pinto RN Service: -- Author Type: Registered Nurse    Filed: 7/9/2019  3:47 PM Encounter Date: 7/3/2019 Status: Signed    : Enedelia Pinto RN (Registered Nurse)    Addended by: ENEDELIA PNITO on: 7/9/2019 03:47 PM        Modules accepted: Orders

## 2021-07-03 NOTE — ADDENDUM NOTE
Addendum Note by Coral Jaime MD at 5/11/2018  3:42 PM     Author: Coral Jaime MD Service: -- Author Type: Physician    Filed: 5/11/2018  3:42 PM Encounter Date: 5/6/2018 Status: Signed    : Coral Jaime MD (Physician)    Addended by: CORAL JAIME on: 5/11/2018 03:42 PM        Modules accepted: Orders

## 2021-07-03 NOTE — ADDENDUM NOTE
Addendum Note by Adrien Cardoza MD at 11/28/2018  1:20 PM     Author: Adrien Cardoza MD Service: -- Author Type: Physician    Filed: 11/28/2018  2:47 PM Encounter Date: 11/28/2018 Status: Signed    : Adrien Cardoza MD (Physician)    Addended by: ADRIEN CARDOZA on: 11/28/2018 02:47 PM        Modules accepted: Orders

## 2021-07-03 NOTE — ADDENDUM NOTE
Addendum Note by Enedelia Pinto RN at 3/6/2020  1:00 PM     Author: Enedelia Pinto RN Service: -- Author Type: Registered Nurse    Filed: 3/24/2020  1:17 PM Encounter Date: 3/6/2020 Status: Signed    : Enedelia Pinto RN (Registered Nurse)    Addended by: ENEDELIA PINTO on: 3/24/2020 01:17 PM        Modules accepted: Orders

## 2021-07-04 NOTE — TELEPHONE ENCOUNTER
Telephone Encounter by Rosamaria Avalos at 6/10/2021 11:36 AM     Author: Rosamaria Avalos Service: -- Author Type: Financial Resource Guide    Filed: 6/10/2021 11:57 AM Encounter Date: 6/10/2021 Status: Signed    : Rosamaria Avalos (Financial Resource Guide)       Ran test claim after getting the PA approval letter and got a rejection that the allow qty is for 2.28ml (2 syringes). I called PA dept and while on hold I checked my faxes and found that I received the below fax after the receiving the PA approval letter that states they need to know if this is a initiation of therapy. So I informed Breanna, the agent I spoke with, that pt is just starting and we need the loading dose of 2 syringes for the first 28 days. She couldn't get into the PA because someone else was in there and she is thinking that person is putting in the override for the loading dose. She instructed me to give it 30 mins and try to process claim again for the 3 syringes for 28 days. To be on the safe time and to save potential time I went ahead and also faxed in the form.

## 2021-07-04 NOTE — TELEPHONE ENCOUNTER
Telephone Encounter by Rosamaria Avalos at 6/10/2021 11:19 AM     Author: Rosamaria Avalos Service: -- Author Type: Financial Resource Guide    Filed: 6/10/2021 11:57 AM Encounter Date: 6/10/2021 Status: Signed    : Rosamaria Avalos (Financial Resource Guide)       Prior Authorization Approval  Medication: Dupixent 200mg/1.14ml  Qty: 3 syringes for the first 28 days then 2 syringes for 28 days thereafter  Effective Dates: 5/11/21 - 6/10/22  Reference Number: [id number]  Insurance Company: [employer] ([employer])  Pharmacy: [facility]  Expected Copay: not covered at this location  Copay Card Available: not elg  Foundation Assistance Needed/Available: no  Patient Assistance Program Needed: no  Patient Notified? Yes  Pharmacy Notified? Yes      Ava

## 2021-07-04 NOTE — TELEPHONE ENCOUNTER
Telephone Encounter by Rosamaria Avalos at 6/10/2021 11:47 AM     Author: Rosamaria Avalos Service: -- Author Type: Financial Resource Guide    Filed: 6/10/2021 11:57 AM Encounter Date: 6/10/2021 Status: Signed    : Rosamaria Avalos (Financial Resource Guide)       Just received this fax after completing my notes below stating the the PA is approved from 5/11/21 - 7/8/21 for the loading dose.

## 2021-07-04 NOTE — ED PROVIDER NOTES
ED Provider Notes by Dinesh Garland DO at 6/18/2021  3:12 PM     Author: Dinesh Garland DO Service: Emergency Medicine Author Type: Physician    Filed: 6/19/2021  9:46 PM Date of Service: 6/18/2021  3:12 PM Status: Signed    : Dinesh Garland DO (Physician)       EMERGENCY DEPARTMENT NOTE     Name: Kulwant Amin    Age/Sex: 51 y.o. female   MRN: [id number]   Evaluation Date & Time:  6/18/2021  3:26 PM    PCP:    Adrien Cardoza MD   ED Provider: Dinesh Garland D.O.       CHIEF COMPLAINT      Chief Complaint   Patient presents with   ? Back Pain       DIAGNOSIS & DISPOSITION     1. Acute bilateral low back pain with right-sided sciatica      DISPOSITION: Home    At the conclusion of the encounter I discussed the results of all of the tests and the disposition. The questions were answered. The patient or family acknowledged understanding and was agreeable with the care plan.    TOTAL CRITICAL CARE TIME (EXCLUDING PROCEDURES): Not applicable    PROCEDURES:   None    EMERGENCY DEPARTMENT COURSE/MEDICAL DECISION MAKING   4:07 PM I met with the patient to gather history and to perform my initial exam.  We discussed treatment options and the plan for care while in the Emergency Department.  6:29 PM I rechecked the patient. We discussed the plan for discharge and the patient is agreeable. She states she feels improved.     51 y.o. female with relevant past history of non-insulin-dependent diabetes and latent tuberculosis who presents to the emergency department for evaluation of low back pain present for 7 to 10 days not improving on gabapentin and acetaminophen.  Differential  Diagnosis  considered included but not limited to base infection or epidural abscess.  Less likely given age malignancy but considered.  No abdominal pain to suggest referred intra-abdominal source.  Vital signs:/80 (Patient Position: Semi-kauffman)   Pulse 88   Temp 97.8  F (36.6  C) (Tympanic)   Resp 18   Wt 126 lb (57.2 kg)    "SpO2 99%   BMI 24.20 kg/m    Pertinent physical exam findings:  Cardiac: Regular rate and rhythm S1-S2,no murmur or rub  Abdomen: Abdomen soft nontender, positive bowel sounds, no mass  Musculoskeletal: Tenderness to the dorsal spinous process of the mid to lower lumbar spine.  No thoracic tenderness.  Neuro: L4 L5-S1 sensory motor intact.  Intact deep tendon reflexes at patellar.  Diagnostic studies:  Imaging: Right lumbar spine: Small left paracentral L5-S1 disc protrusion with same level foraminal stenosis on the left.  No cord compromise.  Evidence of disc space infection and discitis  Lab: BBC 6.9, CRP 0.4, comprehensive metabolic profile and CBC otherwise within normal limits  Interventions: Ketorolac, morphine  Medical decision making: Patient's pain is improved.  She is able to ambulate without difficulty remains neurologically intact.  Patient will be discharged.  Should continue Tylenol ibuprofen for pain management and use Flexeril as needed.  She is given referral to the spine clinic for follow-up.  Return criteria discussed if uncontrolled pain or develops any neurological symptoms occluding numbness weakness in legs or difficulty with bowel or bladder control will return the emergency department.    ED INTERVENTIONS     Medications   morphine injection 4 mg (4 mg Intravenous Given 6/18/21 1640)   ketorolac injection 15 mg (TORADOL) (15 mg Intravenous Given 6/18/21 1639)   gadobutroL 7.5 mmol/7.5 mL (1 mmol/mL) injection 7 mL (GADAVIST) (7 mL Intravenous Given 6/18/21 1721)       DISCHARGE MEDICATIONS        Medication List      START taking these medications    cyclobenzaprine 10 MG tablet  Commonly known as: FLEXERIL  Take 1 tablet (10 mg total) by mouth 3 (three) times a day as needed for muscle spasms.        CONTINUE taking these medications    BD Ultra-Fine Short Pen Needle 31 gauge x 5/16\" Ndle  Generic drug: pen needle, diabetic     blood glucose meter  Commonly known as: GLUCOMETER  Use 1 each " "As Directed 3 (three) times a day. Dispense glucometer brand per patient's insurance at pharmacy discretion.(E11.9)     FreeStyle Gemma 14 Day [place] Misc  Generic drug: flash glucose scanning reader  Use 1 Units As Directed every 8 (eight) hours.     FreeStyle Gemma 14 Day Sensor Kit  Generic drug: flash glucose sensor  Use 1 Units As Directed every 14 (fourteen) days.     * Inject Ease Lancets 30 gauge Misc  Generic drug: lancets  USE 1 EACH AS DIRECTED THREE TIMES A DAY *34 DAYS*     * generic lancets  Use 1 each As Directed 3 (three) times a day. Dispense brand per patient's insurance at pharmacy discretion.(E11.9)     insulin syringe-needle U-100 0.3 mL 30 x 3/8\" Syrg  Use as needed sliding scale insulin as directed.         * This list has 2 medication(s) that are the same as other medications prescribed for you. Read the directions carefully, and ask your doctor or other care provider to review them with you.            ASK your doctor about these medications    acetaminophen 500 MG tablet  Commonly known as: TYLENOL  TAKE 1 PILL BY MOUTH EVERY 6 HOURS AS NEEDED FOR PAIN     * albuterol 90 mcg/actuation inhaler  Commonly known as: Ventolin HFA  Inhale 2 puffs every 6 (six) hours as needed for wheezing.     * albuterol 2.5 mg /3 mL (0.083 %) nebulizer solution  Commonly known as: PROVENTIL  Take 3 mL (2.5 mg total) by nebulization every 6 (six) hours as needed for wheezing.     amitriptyline 10 MG tablet  Commonly known as: ELAVIL  Take 2 tablets (20 mg total) by mouth at bedtime.     aspirin 81 MG EC tablet  Take 1 tablet (81 mg total) by mouth daily.     atorvastatin 80 MG tablet  Commonly known as: LIPITOR  TAKE 1 TABLET (80 MG TOTAL) BY MOUTH AT BEDTIME FOR CHOLESTEROL     benralizumab 30 mg/mL Atin  Inject 30 mg under the skin every 2 (two) months. Inject monthly for first 3 doses     blood glucose test strips  Use 1 each As Directed 3 (three) times a day. Dispense brand per patient's insurance at " pharmacy discretion.(E11.9)     clopidogreL 75 mg tablet  Commonly known as: PLAVIX  TAKE 1 TABLET (75 MG TOTAL) BY MOUTH DAILY.     colchicine 0.6 mg tablet  TAKE 1 TABLET (0.6 MG TOTAL) BY MOUTH DAILY     dupilumab 200 mg/1.14 mL Syrg  Inject 200 mg under the skin every 14 (fourteen) days. Inject 400 mg for 1st dose     ferrous sulfate 325 (65 FE) MG tablet  Take 1 tablet (325 mg total) by mouth daily with breakfast.     fluticasone furoate-vilanteroL 200-25 mcg/dose Dsdv inhaler  Commonly known as: BREO ELLIPTA  Inhale 1 puff daily.     gabapentin 300 MG capsule  Commonly known as: NEURONTIN  TAKE 1 CAPSULE (300 MG TOTAL) BY MOUTH 2 (TWO) TIMES A DAY FOR NERVE PAIN     hydroCHLOROthiazide 12.5 mg capsule  Commonly known as: MICROZIDE  TAKE 1 CAPSULE (12.5 MG TOTAL) BY MOUTH DAILY FOR BLOOD PRESSURE     insulin aspart U-100 100 unit/mL injection  Commonly known as: NovoLOG     loratadine 10 mg tablet  Commonly known as: CLARITIN  Take 1 tablet (10 mg total) by mouth daily.     meclizine 25 mg tablet  Commonly known as: ANTIVERT  Take 1 tablet (25 mg total) by mouth 3 (three) times a day as needed for dizziness.     metFORMIN 850 MG tablet  Commonly known as: GLUCOPHAGE  Take 1 tablet (850 mg total) by mouth 2 (two) times a day with meals.     metoprolol tartrate 25 MG tablet  Commonly known as: LOPRESSOR  TAKE 1 TABLET (25 MG TOTAL) BY MOUTH 2 (TWO) TIMES A DAY FOR BLOOD PRESSURE     montelukast 10 mg tablet  Commonly known as: SINGULAIR  TAKE 1 PILL (10 MG TOTAL) BY MOUTH AT BEDTIME FOR ASTHMA     omeprazole 40 MG capsule  Commonly known as: PriLOSEC     polyethylene glycol 17 gram/dose powder  Commonly known as: GLYCOLAX  MIX 17 GRAMS WITH LIQUID AND DRINK ONCE DAILY FOR CONSTIPATION     predniSONE 2.5 MG tablet  Commonly known as: DELTASONE     Spiriva with HandiHaler 18 mcg inhalation capsule  Generic drug: tiotropium  INHALE THE CONTENTS OF 1 CAPSULE DAILY     sucralfate 1 gram tablet  Commonly known as:  CARAFATE  Take 1 tablet (1 g total) by mouth 4 (four) times a day before meals and at bedtime. Take 1 hour prior to meals         * This list has 2 medication(s) that are the same as other medications prescribed for you. Read the directions carefully, and ask your doctor or other care provider to review them with you.               Where to Get Your Medications      You can get these medications from any pharmacy    Bring a paper prescription for each of these medications    cyclobenzaprine 10 MG tablet           INFORMATION SOURCE AND LIMITATIONS    History/Exam limitations: none  Patient information was obtained from: patient   Use of : N/A    HISTORY OF PRESENT ILLNESS   Kulwant Amin female with a relevant past history of hypertension, GERD, type II diabetes mellitus, asthma, hyperlipidemia, tuberculosis, and COPD who presents to this ED by private car for evaluation of back pain.    For the past week, patient has been experiencing lower back pain. The pain radiates down to her lower extremities bilaterally and she has not experienced this type of pain before. Patient visited her clinic last week and her provider recommended visiting the ED. She is currently taking tylenol and gabapentin. Patient denies abdominal pain and any other additional symptoms at this time.         REVIEW OF SYSTEMS:   Constitutional: Negative for  fever.   HENT: Negative for URI symptoms or sore throat.    Eyes: Negative for visual disturbance.   Cardiac: Negative for  chest pain,palpitations, near syncope or syncope  Respiratory: Negative for cough and shortness of breath.    Gastrointestinal: Negative for abdominal pain, nausea, vomiting, constipation, diarrhea, rectal bleeding or melena.  Genitourinary: Negative for dysuria, flank pain and hematuria.   Musculoskeletal: Positive for back pain.   Skin: Negative for  rash  Neurological: Negative for dizziness, headache, syncope, speech difficulty, unilateral weakness or  imbalance with walking.   Hematological: Negative for adenopathy. Does not bruise/bleed easily.   Psychiatric/Behavioral: Negative for confusion.       PATIENT HISTORY     Past Medical History:   Diagnosis Date   ? Acute asthma exacerbation 1/6/2020   ? Acute blood loss anemia    ? Anxiety    ? Arthritis    ? Asthma    ? Asthma exacerbation 11/19/2015   ? Chest wall pain 12/26/2017   ? COPD (chronic obstructive pulmonary disease) (H)    ? Coronary artery disease due to lipid rich plaque 1/25/2018   ? Depression    ? Diabetes mellitus, type II (H)    ? Essential hypertension 4/14/2017   ? Generalized headaches    ? GERD (gastroesophageal reflux disease)    ? History of anesthesia complications    ? Lactic acid acidosis 11/23/2018   ? Lower GI bleeding    ? Nonspecific chest pain 12/07/2018   ? Pneumonia    ? SVT (supraventricular tachycardia) (H)      Patient Active Problem List   Diagnosis   ? GERD (gastroesophageal reflux disease)   ? Essential hypertension, benign   ? Coronary artery disease due to lipid rich plaque   ? Type 2 diabetes mellitus treated without insulin (H)   ? Moderate persistent asthma   ? Migraine headache   ? Hyperlipidemia   ? Nonspecific chest pain   ? SOB (shortness of breath)   ? Microcytic anemia   ? Latent tuberculosis   ? Dizziness   ? Chronic nonintractable headache, unspecified headache type   ? Chronic obstructive pulmonary disease, unspecified COPD type (H)   ? Bronchiectasis (H)   ? Cataracts, bilateral   ? Dental decay   ? Immigrant with language difficulty   ? Anxiety   ? Chest pain   ? Pneumonia   ? Moderate major depression (H)   ? Right pontine stroke (H)   ? Vertigo   ? H/O arterial ischemic stroke   ? Acculturation difficulty     Past Surgical History:   Procedure Laterality Date   ? CORONARY STENT PLACEMENT  2018   ? CV CORONARY ANGIOGRAM N/A 1/25/2018    Procedure: Coronary Angiogram;  Surgeon: Angelo Serrano MD;  Location: [facility];  Service:    ? AR  COLONOSCOPY FLX DX W/COLLJ SPEC WHEN PFRMD N/A 12/10/2018    Procedure: COLONOSCOPY with polypectomy using biopsy forceps;  Surgeon: Eddie Renteria MD;  Location: [facility];  Service: Gastroenterology   ? ND ESOPHAGOGASTRODUODENOSCOPY TRANSORAL DIAGNOSTIC N/A 12/10/2018    Procedure: ESOPHAGOGASTRODUODENOSCOPY (EGD);  Surgeon: Eddie Renteria MD;  Location: [facility];  Service: Gastroenterology   ? ND ESOPHAGOGASTRODUODENOSCOPY TRANSORAL DIAGNOSTIC N/A 12/3/2020    Procedure: ESOPHAGOGASTRODUODENOSCOPY (EGD) with biospies ;  Surgeon: Avi Crow MD;  Location: [facility];  Service: Gastroenterology     Family History   Problem Relation Age of Onset   ? Other Mother          of an intestinal problem   ? Ulcers Father          of gastritis   ? Breast cancer Neg Hx      Social History     Socioeconomic History   ? Marital status:      Spouse name: Not on file   ? Number of children: Not on file   ? Years of education: Not on file   ? Highest education level: Not on file   Occupational History     Employer: NOT EMPLOYED   Social Needs   ? Financial resource strain: Not on file   ? Food insecurity     Worry: Not on file     Inability: Not on file   ? Transportation needs     Medical: Not on file     Non-medical: Not on file   Tobacco Use   ? Smoking status: Former Smoker     Types: Cigarettes     Quit date:      Years since quittin.4   ? Smokeless tobacco: Never Used   ? Tobacco comment: No passive exposure   Substance and Sexual Activity   ? Alcohol use: No   ? Drug use: No   ? Sexual activity: Yes     Partners: Male   Lifestyle   ? Physical activity     Days per week: Not on file     Minutes per session: Not on file   ? Stress: Not on file   Relationships   ? Social connections     Talks on phone: Not on file     Gets together: Not on file     Attends Congregational service: Not on file     Active member of club or organization: Not on file     Attends meetings of clubs or  "organizations: Not on file     Relationship status: Not on file   ? Intimate partner violence     Fear of current or ex partner: Not on file     Emotionally abused: Not on file     Physically abused: Not on file     Forced sexual activity: Not on file   Other Topics Concern   ? Not on file   Social History Narrative    12/22/2017 The patient lives with her daughter-in-law (who is present), , son, and 2 grandchildren (total of 6 people). Immigrant.     No Known Allergies      OUTPATIENT MEDICATIONS     No current facility-administered medications on file prior to encounter.      Current Outpatient Medications on File Prior to Encounter   Medication Sig   ? acetaminophen (TYLENOL) 500 MG tablet TAKE 1 PILL BY MOUTH EVERY 6 HOURS AS NEEDED FOR PAIN   ? albuterol (PROVENTIL) 2.5 mg /3 mL (0.083 %) nebulizer solution Take 3 mL (2.5 mg total) by nebulization every 6 (six) hours as needed for wheezing.   ? albuterol (VENTOLIN HFA) 90 mcg/actuation inhaler Inhale 2 puffs every 6 (six) hours as needed for wheezing.   ? amitriptyline (ELAVIL) 10 MG tablet Take 2 tablets (20 mg total) by mouth at bedtime.   ? aspirin 81 MG EC tablet Take 1 tablet (81 mg total) by mouth daily.   ? atorvastatin (LIPITOR) 80 MG tablet TAKE 1 TABLET (80 MG TOTAL) BY MOUTH AT BEDTIME FOR CHOLESTEROL   ? BD ULTRA-FINE SHORT PEN NEEDLE 31 gauge x 5/16\" Ndle see administration instructions.   ? benralizumab 30 mg/mL atIn Inject 30 mg under the skin every 2 (two) months. Inject monthly for first 3 doses   ? blood glucose meter (GLUCOMETER) Use 1 each As Directed 3 (three) times a day. Dispense glucometer brand per patient's insurance at pharmacy discretion.(E11.9)   ? blood glucose test strips Use 1 each As Directed 3 (three) times a day. Dispense brand per patient's insurance at pharmacy discretion.(E11.9)   ? clopidogreL (PLAVIX) 75 mg tablet TAKE 1 TABLET (75 MG TOTAL) BY MOUTH DAILY.   ? colchicine 0.6 mg tablet TAKE 1 TABLET (0.6 MG TOTAL) " "BY MOUTH DAILY   ? dupilumab 200 mg/1.14 mL Syrg Inject 200 mg under the skin every 14 (fourteen) days. Inject 400 mg for 1st dose   ? ferrous sulfate 325 (65 FE) MG tablet Take 1 tablet (325 mg total) by mouth daily with breakfast.   ? flash glucose scanning reader (FREESTYLE MONICA 14 DAY READER) Misc Use 1 Units As Directed every 8 (eight) hours.   ? flash glucose sensor (FREESTYLE MONICA 14 DAY SENSOR) Kit Use 1 Units As Directed every 14 (fourteen) days.   ? fluticasone furoate-vilanteroL (BREO ELLIPTA) 200-25 mcg/dose DsDv inhaler Inhale 1 puff daily.   ? gabapentin (NEURONTIN) 300 MG capsule TAKE 1 CAPSULE (300 MG TOTAL) BY MOUTH 2 (TWO) TIMES A DAY FOR NERVE PAIN   ? generic lancets Use 1 each As Directed 3 (three) times a day. Dispense brand per patient's insurance at pharmacy discretion.(E11.9)   ? hydroCHLOROthiazide (MICROZIDE) 12.5 mg capsule TAKE 1 CAPSULE (12.5 MG TOTAL) BY MOUTH DAILY FOR BLOOD PRESSURE   ? INJECT EASE LANCETS 30 gauge [facility] USE 1 EACH AS DIRECTED THREE TIMES A DAY *34 DAYS*   ? insulin aspart U-100 (NOVOLOG) 100 unit/mL injection Inject under the skin 3 (three) times a day with meals. Use per sliding scale < 150 - No insulin, 151-200 use 3 U, 201- 250 use 5 U,   251- 300 use 8 U    ? insulin syringe-needle U-100 0.3 mL 30 x 3/8\" Syrg Use as needed sliding scale insulin as directed.   ? loratadine (CLARITIN) 10 mg tablet Take 1 tablet (10 mg total) by mouth daily.   ? meclizine (ANTIVERT) 25 mg tablet Take 1 tablet (25 mg total) by mouth 3 (three) times a day as needed for dizziness.   ? metFORMIN (GLUCOPHAGE) 850 MG tablet Take 1 tablet (850 mg total) by mouth 2 (two) times a day with meals.   ? metoprolol tartrate (LOPRESSOR) 25 MG tablet TAKE 1 TABLET (25 MG TOTAL) BY MOUTH 2 (TWO) TIMES A DAY FOR BLOOD PRESSURE   ? montelukast (SINGULAIR) 10 mg tablet TAKE 1 PILL (10 MG TOTAL) BY MOUTH AT BEDTIME FOR ASTHMA   ? omeprazole (PRILOSEC) 40 MG capsule Take 40 mg by mouth daily before " breakfast.   ? polyethylene glycol (GLYCOLAX) 17 gram/dose powder MIX 17 GRAMS WITH LIQUID AND DRINK ONCE DAILY FOR CONSTIPATION   ? predniSONE (DELTASONE) 2.5 MG tablet 1 tablet daily.   ? SPIRIVA WITH HANDIHALER 18 mcg inhalation capsule INHALE THE CONTENTS OF 1 CAPSULE DAILY   ? sucralfate (CARAFATE) 1 gram tablet Take 1 tablet (1 g total) by mouth 4 (four) times a day before meals and at bedtime. Take 1 hour prior to meals         PHYSICAL EXAM     Vitals:    06/18/21 1506 06/18/21 1832   BP: 125/81 126/80   Patient Position: Sitting Semi-kauffman   Pulse: 99 88   Resp: 18 18   Temp: 97.8  F (36.6  C)    TempSrc: Tympanic    SpO2: 99% 99%   Weight: 126 lb (57.2 kg)        Physical Exam   Constitutional: Oriented to person, place, and time. Appears well-developed and well-nourished.   HEENT:    Head: Atraumatic.    Nose: Nose normal.    Mouth/Throat: Oropharynx is clear and moist.    Eyes: EOM are normal. Pupils are equal, round, and reactive to light.   Neck: Normal range of motion. Neck supple.   Cardiovascular: Normal rate, regular rhythm and normal heart sounds.    Pulmonary/Chest: Normal effort  and breath sounds normal.   Abdominal: Soft. Bowel sounds are normal.   Musculoskeletal: Normal range of motion.   Neurological: Alert and oriented to person, place, and time. Normal strength.No sensory deficit. No cranial nerve deficit . Coordination serial fingers, finger-to-nose normal and gait normal.   Skin: Skin is warm and dry.   Psychiatric: Normal mood and affect. Behavior is normal. Thought content normal.       DIAGNOSTICS    LABORATORY FINDINGS (REVIEWED AND INTERPRETED):  Labs Reviewed   COMPREHENSIVE METABOLIC PANEL - Abnormal; Notable for the following components:       Result Value    Glucose 142 (*)     Albumin 3.4 (*)     All other components within normal limits    Narrative:     Fasting Glucose reference range is 70-99 mg/dL per  American Diabetes Association (ADA) guidelines.   HM1 (CBC WITH DIFF)  - Abnormal; Notable for the following components:    Hemoglobin 11.6 (*)     MCHC 31.7 (*)     All other components within normal limits   C-REACTIVE PROTEIN - Normal   ERYTHROCYTE SEDIMENTATION RATE - Normal   HM1(CBC WITH DIFFERENTIAL)    Narrative:     The following orders were created for panel order HM1(CBC and Differential).  Procedure                               Abnormality         Status                     ---------                               -----------         ------                     HM1 (CBC with Diff)[[id number]]          Abnormal            Final result                 Please view results for these tests on the individual orders.         IMAGING (REVIEWED AND INTERPRETED):  No results found.        ECG (REVIEWED AND INTERPRETED):   ECG:   None    I have reviewed the patient's ECG, with comments made as listed above. Please see scanned image for full interpretation.         I, Kristi Hill, am serving as a scribe to document services personally performed by Dinesh Garland D.O., based on my observation and the providers statements to me.    I, Dinesh Garland D.O., attest that Kristi Hill is acting in a scribe capacity, has observed my performance of the services and has documented them in accordance with my direction.    Dinesh Garland D.O.  EMERGENCY MEDICINE   06/19/21  [facility] EMERGENCY DEPARTMENT  [address]  [facility] [address]  Dept: [phone]  Loc: [phone]     Dinesh Garland,   06/19/21 2146

## 2021-07-04 NOTE — TELEPHONE ENCOUNTER
Telephone Encounter by Rosamaria Avalos at 6/10/2021  8:39 AM     Author: Rosamaria Avalos Service: -- Author Type: Financial Resource Guide    Filed: 6/10/2021  8:45 AM Encounter Date: 6/10/2021 Status: Signed    : Rosamaria Avalos (Financial Resource Guide)       PA Initiation  Medication: Dupixent 200mg/1.14ml  QTY: 3 syringes for the first 28 days then 2 syringes for 28 days thereafter  Insurance Company: [employer] ([employer])  Pharmacy Filing Rx: [facility]  Filling Pharmacy Phone: [email or web address]  Filling Pharmacy Fax: [email or web address]  Start Date: 6/10/21  Additional Information: ariel

## 2021-07-06 VITALS
DIASTOLIC BLOOD PRESSURE: 62 MMHG | OXYGEN SATURATION: 100 % | HEIGHT: 61 IN | BODY MASS INDEX: 23.6 KG/M2 | HEART RATE: 97 BPM | SYSTOLIC BLOOD PRESSURE: 102 MMHG | WEIGHT: 125 LBS

## 2021-07-06 VITALS — WEIGHT: 126 LBS | BODY MASS INDEX: 24.2 KG/M2

## 2021-07-07 NOTE — PATIENT INSTRUCTIONS - HE
1. I have switched you from Spiriva Handihaler to Spiriva Respimat. Please use this 1 puff every day.  2. I have given you a 5 day prescription for 20 mg of Prednisone followed by 5 days of 10 mg of prednisone. After this, you can go back to your previous 2.5 mg every day.  3. Please use as needed over the counter Benadryl 25mg to see if this helps.

## 2021-07-14 PROBLEM — K92.2 GI BLEEDING: Status: RESOLVED | Noted: 2018-12-09 | Resolved: 2019-07-11

## 2021-07-14 PROBLEM — K62.5 RECTAL BLEED: Status: RESOLVED | Noted: 2018-12-07 | Resolved: 2020-06-28

## 2021-07-14 PROBLEM — J45.901 ACUTE ASTHMA EXACERBATION: Status: RESOLVED | Noted: 2020-01-06 | Resolved: 2020-06-28

## 2021-07-14 PROBLEM — Z95.5 S/P CORONARY ARTERY STENT PLACEMENT: Status: RESOLVED | Noted: 2018-02-02 | Resolved: 2020-06-28

## 2021-07-14 PROBLEM — J45.41: Status: RESOLVED | Noted: 2017-11-15 | Resolved: 2017-11-15

## 2021-07-14 PROBLEM — K92.1 BLOOD IN STOOL: Status: RESOLVED | Noted: 2019-10-01 | Resolved: 2020-06-28

## 2021-07-14 PROBLEM — K62.5 RECTAL BLEEDING: Status: RESOLVED | Noted: 2018-12-07 | Resolved: 2019-07-11

## 2021-07-14 PROBLEM — J45.909 ASTHMA WITHOUT STATUS ASTHMATICUS: Status: RESOLVED | Noted: 2018-01-03 | Resolved: 2019-01-22

## 2021-07-14 PROBLEM — I47.10 SVT (SUPRAVENTRICULAR TACHYCARDIA) (H): Status: RESOLVED | Noted: 2017-12-26 | Resolved: 2020-06-28

## 2021-07-14 PROBLEM — K92.1 HEMATOCHEZIA: Status: RESOLVED | Noted: 2018-12-07 | Resolved: 2019-01-22

## 2021-07-14 PROBLEM — I20.0 UNSTABLE ANGINA (H): Status: RESOLVED | Noted: 2018-10-05 | Resolved: 2019-01-22

## 2021-07-29 ENCOUNTER — OFFICE VISIT (OUTPATIENT)
Dept: PHYSICAL MEDICINE AND REHAB | Facility: CLINIC | Age: 51
End: 2021-07-29
Payer: COMMERCIAL

## 2021-07-29 VITALS — HEART RATE: 105 BPM | SYSTOLIC BLOOD PRESSURE: 136 MMHG | DIASTOLIC BLOOD PRESSURE: 79 MMHG | OXYGEN SATURATION: 97 %

## 2021-07-29 DIAGNOSIS — M54.41 CHRONIC BILATERAL LOW BACK PAIN WITH BILATERAL SCIATICA: Primary | ICD-10-CM

## 2021-07-29 DIAGNOSIS — M51.369 BULGING LUMBAR DISC: ICD-10-CM

## 2021-07-29 DIAGNOSIS — J45.50 SEVERE PERSISTENT ASTHMA WITHOUT COMPLICATION (H): Primary | ICD-10-CM

## 2021-07-29 DIAGNOSIS — M54.42 CHRONIC BILATERAL LOW BACK PAIN WITH BILATERAL SCIATICA: Primary | ICD-10-CM

## 2021-07-29 DIAGNOSIS — G89.29 CHRONIC BILATERAL LOW BACK PAIN WITH BILATERAL SCIATICA: Primary | ICD-10-CM

## 2021-07-29 DIAGNOSIS — M48.061 LUMBAR FORAMINAL STENOSIS: ICD-10-CM

## 2021-07-29 DIAGNOSIS — M79.18 MYOFASCIAL PAIN: ICD-10-CM

## 2021-07-29 PROCEDURE — 99204 OFFICE O/P NEW MOD 45 MIN: CPT | Performed by: NURSE PRACTITIONER

## 2021-07-29 RX ORDER — CYCLOBENZAPRINE HCL 5 MG
5-10 TABLET ORAL 3 TIMES DAILY PRN
Qty: 30 TABLET | Refills: 3 | Status: SHIPPED | OUTPATIENT
Start: 2021-07-29 | End: 2021-09-28

## 2021-07-29 RX ORDER — GABAPENTIN 300 MG/1
600 CAPSULE ORAL 3 TIMES DAILY
Qty: 180 CAPSULE | Refills: 3 | Status: SHIPPED | OUTPATIENT
Start: 2021-07-29 | End: 2022-04-12

## 2021-07-29 RX ORDER — DUPILUMAB 200 MG/1.14ML
200 INJECTION, SOLUTION SUBCUTANEOUS
Qty: 1.14 ML | Refills: 6 | Status: SHIPPED | OUTPATIENT
Start: 2021-07-29 | End: 2022-02-28

## 2021-07-29 RX ORDER — DUPILUMAB 200 MG/1.14ML
200 INJECTION, SOLUTION SUBCUTANEOUS
Qty: 1.14 ML | Refills: 6 | Status: CANCELLED | OUTPATIENT
Start: 2021-07-29

## 2021-07-29 ASSESSMENT — PAIN SCALES - GENERAL: PAINLEVEL: SEVERE PAIN (6)

## 2021-07-29 NOTE — PROGRESS NOTES
ASSESSMENT: Kulwant Amin is a 51 year old female who presents for consultation at the request of PCP Adrien Cardoza, with a past medical history significant for hypertension, asthma exacerbation, depression, type 2 diabetes mellitus, GERD, headache, COPD, nonspecific chest pain, CAD, latent TB, lower GI bleed, generalized headaches who presents today for new patient evaluation of:    -Bilateral low back pain with lumbar radiculitis left greater than right for the last 2 months with no known injury.  MRI with disc bulging at L5-S1 with facet arthropathy with left S1 and slight L5 compression.  L4-5 with annular fissure.      Patient is neurologically intact on exam. No myelopathic or red flag symptoms.     Diagnoses and all orders for this visit:  Chronic bilateral low back pain with bilateral sciatica  -     gabapentin (NEURONTIN) 300 MG capsule; Take 2 capsules (600 mg) by mouth 3 times daily  -     cyclobenzaprine (FLEXERIL) 5 MG tablet; Take 1-2 tablets (5-10 mg) by mouth 3 times daily as needed for muscle spasms  -     Physical Therapy Referral; Future  Bulging lumbar disc  -     Physical Therapy Referral; Future  Lumbar foraminal stenosis  -     Physical Therapy Referral; Future  Myofascial pain  -     cyclobenzaprine (FLEXERIL) 5 MG tablet; Take 1-2 tablets (5-10 mg) by mouth 3 times daily as needed for muscle spasms  -     Physical Therapy Referral; Future       PLAN:  Reviewed spine anatomy and disease process. Discussed diagnosis and treatment options with the patient today. A shared decision making model was used.  The patient's values and choices were respected. The following represents what was discussed and decided upon by the provider and the patient.      -DIAGNOSTIC TESTS:  Images were personally reviewed and interpreted and explained to patient today using spine model.   --Lumbar spine MRI 6/18/2021 with left disc protrusion with left L5 and S1 compression, moderate facet arthropathy.  L4-5 annular  tear, no nerve compression.    -PHYSICAL THERAPY: Referral to physical therapy placed to [facility] for establishing home exercises for lumbar core strengthening and nerve glides.  Discussed the importance of core strengthening, ROM, stretching exercises with the patient and how each of these entities is important in decreasing pain.  Explained to the patient that the purpose of physical therapy is to teach the patient a home exercise program.  These exercises need to be performed every day in order to decrease pain and prevent future occurrences of pain.        -MEDICATIONS: Increase Gabapentin 300 mg to titrate up to 2 tablets 3 times daily as tolerated for radicular pain.  -Prescribed Flexeril 5 mg 1 tablet 3 times daily as needed for myofascial pain.  Discussed multiple medication options today with patient. Discussed risks, side effects, and proper use of medications. Patient verbalized understanding.    -INTERVENTIONS: Did discuss trialing bilateral versus left only L5-S1 TFESI, patient would like to hold off of and trial conservative treatments over injections at this time.    Discussed risks and benefits of injections with patient today.    -PATIENT EDUCATION:  Total time of 42 minutes spent with the patient, reviewing the chart, placing orders, and documenting.   -Today we also discussed the issues related to the current COVID-19 pandemic, the pros and cons of the current treatment plan, the CDC guidelines such as social distancing, washing hands, masking, and covering the cough.    -FOLLOW-UP:   Follow-up in 4 weeks if symptoms or not improving, sooner if pain is worsening or new symptoms arise.    Advised patient to call the [facility] if symptoms worsen or you have problems controlling bladder and bowel function.   ______________________________________________________________________    SUBJECTIVE:  HPI:  Kulwant Amin  Is a 51 year old female who presents today for new patient evaluation of  low back pain lumbosacral junction that radiates to the bilateral buttock posterior lateral thighs that is greater on the left ongoing for the last 2 months with no known injury.  Currently her pain is constant 6/10, 10 at its worst, 4 at its best.  She denies numbness or tingling lower extremities but does report generalized lower extremity weakness, denies any episodes of her legs giving out on her or any recent trips or falls.  Denies bowel or bladder loss control.     was present during entire visit today.   Patient's family who does speak English was present today during entire visit and added to past medical/surgical/family/social history and history of presenting illness.     -Treatment to Date: No prior spinal surgery or spinal injections  PT home therapy recently for multiple medical concerns.    -Medications:  Flexeril with benefit  Gabapentin 300 mg twice daily with some benefit    Current Outpatient Medications   Medication     cyclobenzaprine (FLEXERIL) 5 MG tablet     gabapentin (NEURONTIN) 300 MG capsule     acetaminophen (TYLENOL) 500 MG tablet     albuterol (PROAIR HFA, PROVENTIL HFA, VENTOLIN HFA) 108 (90 BASE) MCG/ACT inhaler     ASPIRIN LOW DOSE 81 MG EC tablet     atorvastatin (LIPITOR) 80 MG tablet     azithromycin (ZITHROMAX) 250 MG tablet     benzonatate (TESSALON) 100 MG capsule     calcium carbonate-vitamin D 500-400 MG-UNIT TABS tablt     celecoxib (CELEBREX) 200 MG capsule     clopidogrel (PLAVIX) 75 MG tablet     colchicine (COLCYRS) 0.6 MG tablet     ferrous sulfate (FEROSUL) 325 (65 Fe) MG tablet     fluticasone-vilanterol (BREO ELLIPTA) 200-25 MCG/INH inhaler     guaiFENesin-codeine (ROBITUSSIN AC) 100-10 MG/5ML SOLN     hydrochlorothiazide (MICROZIDE) 12.5 MG capsule     indomethacin (INDOCIN) 25 MG capsule     insulin aspart (NOVOLOG VIAL) 100 UNITS/ML vial     ipratropium - albuterol 0.5 mg/2.5 mg/3 mL (DUONEB) 0.5-2.5 (3) MG/3ML nebulization     loratadine (CLARITIN) 10  MG tablet     meclizine (ANTIVERT) 25 MG tablet     metFORMIN (GLUCOPHAGE) 850 MG tablet     metoprolol tartrate (LOPRESSOR) 25 MG tablet     mometasone-formoterol (DULERA) 200-5 MCG/ACT oral inhaler     montelukast (SINGULAIR) 10 MG tablet     naproxen (NAPROSYN) 375 MG tablet     naproxen (NAPROSYN) 500 MG tablet     omeprazole (PRILOSEC) 20 MG capsule     ondansetron (ZOFRAN-ODT) 4 MG ODT tab     ORDER FOR DME     ORDER FOR DME     ORDER FOR DME     ORDER FOR DME     polyethylene glycol (MIRALAX) powder     polyvinyl alcohol (ARTIFICIAL TEARS) 1.4 % ophthalmic solution     predniSONE (DELTASONE) 10 MG tablet     sucralfate (CARAFATE) 1 GM tablet     tiotropium (SPIRIVA RESPIMAT) 2.5 MCG/ACT inhalation aerosol     No current facility-administered medications for this visit.       No Known Allergies    Past Medical History:   Diagnosis Date     Acute asthma exacerbation 1/6/2020     Acute blood loss anemia      Anxiety      Arthritis      Asthma      Asthma exacerbation 11/19/2015     Chest wall pain 12/26/2017    Added automatically from request for surgery [id number]     COPD (chronic obstructive pulmonary disease) (H)      Coronary artery disease due to lipid rich plaque 1/25/2018     Depression      Diabetes mellitus, type II (H)      Essential hypertension 4/14/2017     Generalized headaches      GERD (gastroesophageal reflux disease)      History of anesthesia complications     nausea and vomiting     Lactic acid acidosis 11/23/2018     Lower GI bleeding      Nonspecific chest pain 12/07/2018    2 negative stress tests since coronary stenting in 2018; in June 2020 she had 1 week of this pain with normal cardiac enzymes and relief with Toradol     Pneumonia      SVT (supraventricular tachycardia) (H)      TB lung, latent     9 mos INH        Patient Active Problem List   Diagnosis     Pulmonary nodule, right     Environmental allergies     TB lung, latent     COPD (chronic obstructive pulmonary disease)/Asthma       HTN (hypertension)     Esophageal reflux (GERD)     Bronchiectasis (H)     Hyperthyroidism     Cataracts, bilateral     Corneal opacity     Irregular astigmatism     Chest pain     Acculturation difficulty     Anxiety     Asthma     Cerebrovascular accident of right pontine structure (H)     Chronic nonintractable headache, unspecified headache type     Coronary artery disease due to lipid rich plaque     Cough     Dizziness     Elevated troponin     Hematuria     Hyperlipidemia     Microcytic anemia     Migraine headache     Moderate major depression (H)     Moderate persistent asthma     Nonspecific (abnormal) findings on radiological and other examination of other intrathoracic organs     Nonspecific reaction to tuberculin skin test without active tuberculosis     Personal history of underimmunization status     Pneumonia     Type 2 diabetes mellitus treated without insulin (H)     Unspecified visual loss     Chronic obstructive pulmonary disease, unspecified COPD type (H)     Dyspnea     SOB (shortness of breath)     Essential hypertension     GERD (gastroesophageal reflux disease)     Pre-syncope     Latent tuberculosis     Dental caries     Asthma with exacerbation     Nonspecific chest pain       Past Surgical History:   Procedure Laterality Date     CORONARY STENT PLACEMENT  2018     CV CORONARY ANGIOGRAM N/A 1/25/2018    Procedure: Coronary Angiogram;  Surgeon: Angelo Serrano MD;  Location: [facility];  Service:      IN ESOPHAGOGASTRODUODENOSCOPY TRANSORAL DIAGNOSTIC N/A 12/10/2018    Procedure: ESOPHAGOGASTRODUODENOSCOPY (EGD);  Surgeon: Eddie Renteria MD;  Location: [facility];  Service: Gastroenterology     IN ESOPHAGOGASTRODUODENOSCOPY TRANSORAL DIAGNOSTIC N/A 12/3/2020    Procedure: ESOPHAGOGASTRODUODENOSCOPY (EGD) with biospies ;  Surgeon: Avi Crow MD;  Location: [facility];  Service: Gastroenterology     [facility] COLONOSCOPY W/WO BRUSH/WASH N/A 12/10/2018     Procedure: COLONOSCOPY with polypectomy using biopsy forceps;  Surgeon: Eddie Renteria MD;  Location: [facility];  Service: Gastroenterology       Family History   Problem Relation Age of Onset     Other - See Comments Mother          of an intestinal problem     Ulcers Father          of gastritis     Breast Cancer No family hx of        Reviewed past medical, surgical, and family history with patient found on new patient intake packet located in EMR Media tab.     SOCIAL HX: Patient is , not currently working.  Patient denies smoking/tobacco use, denies alcohol use, denies history being heavy drinker, denies recreational drug use.    ROS: Positive for imbalance, depression, headache, ringing in ears, shortness of breath, cough, constipation.  Specifically negative for bowel/bladder dysfunction, balance changes, dizziness, foot drop, fevers, chills, appetite changes, nausea/vomiting, unexplained weight loss. Otherwise 13 systems reviewed are negative. Please see the patient's intake questionnaire from today for details.    OBJECTIVE:  /79 (BP Location: Right arm, Patient Position: Sitting)   Pulse 105   SpO2 97%     PHYSICAL EXAMINATION:    --CONSTITUTIONAL:  Vital signs as above.  No acute distress.  The patient is well nourished and well groomed.  --PSYCHIATRIC:  Appropriate mood and affect. The patient is awake, alert, oriented to person, place, time and answering questions appropriately with clear speech.    --SKIN:  Skin over the face, bilateral lower extremities, and posterior torso is clean, dry, intact without rashes.    --RESPIRATORY: Normal rhythm and effort. No abnormal accessory muscle breathing patterns noted.   --ABDOMINAL:  Non-distended.  --STANDING EXAMINATION:  Normal lumbar lordosis noted, no lateral shift.  --MUSCULOSKELETAL: Lumbar spine inspection reveals no evidence of deformity. Range of motion is limited in all movements limited in lumbar flexion, extension,  lateral rotation. No tenderness to palpation lumbar spine. Straight leg raising in the seated position is negative to radicular pain. Sciatic notch non-tender.  --SACROILIAC JOINT: One Finger point test negative.  --GROSS MOTOR: Gait is non-antalgic. Easily arises from a seated position. Toe walking and heel walking are normal without significant difficulty.    --LOWER EXTREMITY MOTOR TESTING: Strength testing very difficult to differentiate true weakness versus pain, no clear neurologic weakness.  Plantar flexion left 5/5, right 5/5   Dorsiflexion left 5/5, right 5/5   Great toe MTP extension left 5/5, right 5/5  Knee flexion left 5/5, right 5/5  Knee extension left 5/5, right 5/5   Hip flexion left 5/5, right 5/5  --HIPS: Full range of motion bilaterally.  --NEUROLOGICAL:  2/4 patellar, medial hamstring, and achilles reflexes bilaterally.  Sensation to light touch is intact in the bilateral L4, L5, and S1 dermatomes. Babinski is negative. No clonus.  Negative Juan reflex bilaterally.  --VASCULAR: Bilateral lower extremities are warm.  There is no pitting edema of the bilateral lower extremities.    RESULTS: Prior medical records from [facility] and [facility]where were reviewed today.    Imaging: Lumbar spine Imaging was personally reviewed and interpreted today. The images were shown to the patient and the findings were explained using a spine model.

## 2021-07-29 NOTE — PATIENT INSTRUCTIONS
~Please call our [facility] Nurse Navigation line [phone] with any questions or concerns about your treatment plan, if symptoms worsen and you would like to be seen urgently, or if you have problems controlling bladder and bowel function.        Prescribed Gabapentin today, 300 mg tablets, to be titrated up to 2 tablets 3 times a day as tolerated for your nerve pain. Please follow Gabapentin dosing chart below.    Gabapentin 300mg Dosing Chart    DATE  MORNING AFTERNOON BEDTIME    Day 7 1 1 1    Day 8 1 1 1    Day 9 1 1 1    Day 10 1 1 2    Day 11 1 1 2    Day 12 1 1 2    Day 13 2 1 2    Day 14 2 1 2    Day 15 2 1 2    Day 16 2 2 2     Continue medication, taking 2 capsules three times daily  Please call if you have any questions regarding how to take your medication      You have been referred for Physical Therapy to [facility]. They will call you to schedule an appointment.  Scheduling phone number is [phone].  Discussed the importance of core strengthening, ROM, stretching exercises and how each of these entities is important in decreasing pain and improving long term spine health.  The purpose of physical therapy is to teach you an individualized home exercise program.  These exercises need to be performed every day in order to decrease pain and prevent future occurrences of pain.

## 2021-07-29 NOTE — LETTER
7/29/2021         RE: Kulwant Amin  [address] [facility] [address]        Dear Colleague,    Thank you for referring your patient, Kulwant Amin, to the [facility] [place]. Please see a copy of my visit note below.    ASSESSMENT: Kulwant Amin is a 51 year old female who presents for consultation at the request of PCP Adrien Cardoza, with a past medical history significant for hypertension, asthma exacerbation, depression, type 2 diabetes mellitus, GERD, headache, COPD, nonspecific chest pain, CAD, latent TB, lower GI bleed, generalized headaches who presents today for new patient evaluation of:    -Bilateral low back pain with lumbar radiculitis left greater than right for the last 2 months with no known injury.  MRI with disc bulging at L5-S1 with facet arthropathy with left S1 and slight L5 compression.  L4-5 with annular fissure.      Patient is neurologically intact on exam. No myelopathic or red flag symptoms.     Diagnoses and all orders for this visit:  Chronic bilateral low back pain with bilateral sciatica  -     gabapentin (NEURONTIN) 300 MG capsule; Take 2 capsules (600 mg) by mouth 3 times daily  -     cyclobenzaprine (FLEXERIL) 5 MG tablet; Take 1-2 tablets (5-10 mg) by mouth 3 times daily as needed for muscle spasms  -     Physical Therapy Referral; Future  Bulging lumbar disc  -     Physical Therapy Referral; Future  Lumbar foraminal stenosis  -     Physical Therapy Referral; Future  Myofascial pain  -     cyclobenzaprine (FLEXERIL) 5 MG tablet; Take 1-2 tablets (5-10 mg) by mouth 3 times daily as needed for muscle spasms  -     Physical Therapy Referral; Future       PLAN:  Reviewed spine anatomy and disease process. Discussed diagnosis and treatment options with the patient today. A shared decision making model was used.  The patient's values and choices were respected. The following represents what was discussed and decided upon by the provider and the patient.       -DIAGNOSTIC TESTS:  Images were personally reviewed and interpreted and explained to patient today using spine model.   --Lumbar spine MRI 6/18/2021 with left disc protrusion with left L5 and S1 compression, moderate facet arthropathy.  L4-5 annular tear, no nerve compression.    -PHYSICAL THERAPY: Referral to physical therapy placed to miles Ballard for establishing home exercises for lumbar core strengthening and nerve glides.  Discussed the importance of core strengthening, ROM, stretching exercises with the patient and how each of these entities is important in decreasing pain.  Explained to the patient that the purpose of physical therapy is to teach the patient a home exercise program.  These exercises need to be performed every day in order to decrease pain and prevent future occurrences of pain.        -MEDICATIONS: Increase Gabapentin 300 mg to titrate up to 2 tablets 3 times daily as tolerated for radicular pain.  -Prescribed Flexeril 5 mg 1 tablet 3 times daily as needed for myofascial pain.  Discussed multiple medication options today with patient. Discussed risks, side effects, and proper use of medications. Patient verbalized understanding.    -INTERVENTIONS: Did discuss trialing bilateral versus left only L5-S1 TFESI, patient would like to hold off of and trial conservative treatments over injections at this time.    Discussed risks and benefits of injections with patient today.    -PATIENT EDUCATION:  Total time of 42 minutes spent with the patient, reviewing the chart, placing orders, and documenting.   -Today we also discussed the issues related to the current COVID-19 pandemic, the pros and cons of the current treatment plan, the CDC guidelines such as social distancing, washing hands, masking, and covering the cough.    -FOLLOW-UP:   Follow-up in 4 weeks if symptoms or not improving, sooner if pain is worsening or new symptoms arise.    Advised patient to call the Spine Center if symptoms  worsen or you have problems controlling bladder and bowel function.   ______________________________________________________________________    SUBJECTIVE:  HPI:  Kulwant Amin  Is a 51 year old female who presents today for new patient evaluation of low back pain lumbosacral junction that radiates to the bilateral buttock posterior lateral thighs that is greater on the left ongoing for the last 2 months with no known injury.  Currently her pain is constant 6/10, 10 at its worst, 4 at its best.  She denies numbness or tingling lower extremities but does report generalized lower extremity weakness, denies any episodes of her legs giving out on her or any recent trips or falls.  Denies bowel or bladder loss control.     was present during entire visit today.   Patient's family who does speak English was present today during entire visit and added to past medical/surgical/family/social history and history of presenting illness.     -Treatment to Date: No prior spinal surgery or spinal injections  PT home therapy recently for multiple medical concerns.    -Medications:  Flexeril with benefit  Gabapentin 300 mg twice daily with some benefit    Current Outpatient Medications   Medication     cyclobenzaprine (FLEXERIL) 5 MG tablet     gabapentin (NEURONTIN) 300 MG capsule     acetaminophen (TYLENOL) 500 MG tablet     albuterol (PROAIR HFA, PROVENTIL HFA, VENTOLIN HFA) 108 (90 BASE) MCG/ACT inhaler     ASPIRIN LOW DOSE 81 MG EC tablet     atorvastatin (LIPITOR) 80 MG tablet     azithromycin (ZITHROMAX) 250 MG tablet     benzonatate (TESSALON) 100 MG capsule     calcium carbonate-vitamin D 500-400 MG-UNIT TABS tablt     celecoxib (CELEBREX) 200 MG capsule     clopidogrel (PLAVIX) 75 MG tablet     colchicine (COLCYRS) 0.6 MG tablet     ferrous sulfate (FEROSUL) 325 (65 Fe) MG tablet     fluticasone-vilanterol (BREO ELLIPTA) 200-25 MCG/INH inhaler     guaiFENesin-codeine (ROBITUSSIN AC) 100-10 MG/5ML SOLN      hydrochlorothiazide (MICROZIDE) 12.5 MG capsule     indomethacin (INDOCIN) 25 MG capsule     insulin aspart (NOVOLOG VIAL) 100 UNITS/ML vial     ipratropium - albuterol 0.5 mg/2.5 mg/3 mL (DUONEB) 0.5-2.5 (3) MG/3ML nebulization     loratadine (CLARITIN) 10 MG tablet     meclizine (ANTIVERT) 25 MG tablet     metFORMIN (GLUCOPHAGE) 850 MG tablet     metoprolol tartrate (LOPRESSOR) 25 MG tablet     mometasone-formoterol (DULERA) 200-5 MCG/ACT oral inhaler     montelukast (SINGULAIR) 10 MG tablet     naproxen (NAPROSYN) 375 MG tablet     naproxen (NAPROSYN) 500 MG tablet     omeprazole (PRILOSEC) 20 MG capsule     ondansetron (ZOFRAN-ODT) 4 MG ODT tab     ORDER FOR DME     ORDER FOR DME     ORDER FOR DME     ORDER FOR DME     polyethylene glycol (MIRALAX) powder     polyvinyl alcohol (ARTIFICIAL TEARS) 1.4 % ophthalmic solution     predniSONE (DELTASONE) 10 MG tablet     sucralfate (CARAFATE) 1 GM tablet     tiotropium (SPIRIVA RESPIMAT) 2.5 MCG/ACT inhalation aerosol     No current facility-administered medications for this visit.       No Known Allergies    Past Medical History:   Diagnosis Date     Acute asthma exacerbation 1/6/2020     Acute blood loss anemia      Anxiety      Arthritis      Asthma      Asthma exacerbation 11/19/2015     Chest wall pain 12/26/2017    Added automatically from request for surgery [id number]     COPD (chronic obstructive pulmonary disease) (H)      Coronary artery disease due to lipid rich plaque 1/25/2018     Depression      Diabetes mellitus, type II (H)      Essential hypertension 4/14/2017     Generalized headaches      GERD (gastroesophageal reflux disease)      History of anesthesia complications     nausea and vomiting     Lactic acid acidosis 11/23/2018     Lower GI bleeding      Nonspecific chest pain 12/07/2018    2 negative stress tests since coronary stenting in 2018; in June 2020 she had 1 week of this pain with normal cardiac enzymes and relief with Toradol     Pneumonia       SVT (supraventricular tachycardia) (H)      TB lung, latent     9 mos INH        Patient Active Problem List   Diagnosis     Pulmonary nodule, right     Environmental allergies     TB lung, latent     COPD (chronic obstructive pulmonary disease)/Asthma      HTN (hypertension)     Esophageal reflux (GERD)     Bronchiectasis (H)     Hyperthyroidism     Cataracts, bilateral     Corneal opacity     Irregular astigmatism     Chest pain     Acculturation difficulty     Anxiety     Asthma     Cerebrovascular accident of right pontine structure (H)     Chronic nonintractable headache, unspecified headache type     Coronary artery disease due to lipid rich plaque     Cough     Dizziness     Elevated troponin     Hematuria     Hyperlipidemia     Microcytic anemia     Migraine headache     Moderate major depression (H)     Moderate persistent asthma     Nonspecific (abnormal) findings on radiological and other examination of other intrathoracic organs     Nonspecific reaction to tuberculin skin test without active tuberculosis     Personal history of underimmunization status     Pneumonia     Type 2 diabetes mellitus treated without insulin (H)     Unspecified visual loss     Chronic obstructive pulmonary disease, unspecified COPD type (H)     Dyspnea     SOB (shortness of breath)     Essential hypertension     GERD (gastroesophageal reflux disease)     Pre-syncope     Latent tuberculosis     Dental caries     Asthma with exacerbation     Nonspecific chest pain       Past Surgical History:   Procedure Laterality Date     CORONARY STENT PLACEMENT  2018     CV CORONARY ANGIOGRAM N/A 1/25/2018    Procedure: Coronary Angiogram;  Surgeon: Angelo Serrano MD;  Location: [facility];  Service:      MS ESOPHAGOGASTRODUODENOSCOPY TRANSORAL DIAGNOSTIC N/A 12/10/2018    Procedure: ESOPHAGOGASTRODUODENOSCOPY (EGD);  Surgeon: Eddie Renteria MD;  Location: [facility];  Service: Gastroenterology     MS  ESOPHAGOGASTRODUODENOSCOPY TRANSORAL DIAGNOSTIC N/A 12/3/2020    Procedure: ESOPHAGOGASTRODUODENOSCOPY (EGD) with biospies ;  Surgeon: Avi Crow MD;  Location: [facility];  Service: Gastroenterology     [facility] COLONOSCOPY W/WO BRUSH/WASH N/A 12/10/2018    Procedure: COLONOSCOPY with polypectomy using biopsy forceps;  Surgeon: Eddie Renteria MD;  Location: [facility];  Service: Gastroenterology       Family History   Problem Relation Age of Onset     Other - See Comments Mother          of an intestinal problem     Ulcers Father          of gastritis     Breast Cancer No family hx of        Reviewed past medical, surgical, and family history with patient found on new patient intake packet located in EMR Media tab.     SOCIAL HX: Patient is , not currently working.  Patient denies smoking/tobacco use, denies alcohol use, denies history being heavy drinker, denies recreational drug use.    ROS: Positive for imbalance, depression, headache, ringing in ears, shortness of breath, cough, constipation.  Specifically negative for bowel/bladder dysfunction, balance changes, dizziness, foot drop, fevers, chills, appetite changes, nausea/vomiting, unexplained weight loss. Otherwise 13 systems reviewed are negative. Please see the patient's intake questionnaire from today for details.    OBJECTIVE:  /79 (BP Location: Right arm, Patient Position: Sitting)   Pulse 105   SpO2 97%     PHYSICAL EXAMINATION:    --CONSTITUTIONAL:  Vital signs as above.  No acute distress.  The patient is well nourished and well groomed.  --PSYCHIATRIC:  Appropriate mood and affect. The patient is awake, alert, oriented to person, place, time and answering questions appropriately with clear speech.    --SKIN:  Skin over the face, bilateral lower extremities, and posterior torso is clean, dry, intact without rashes.    --RESPIRATORY: Normal rhythm and effort. No abnormal accessory muscle breathing patterns noted.    --ABDOMINAL:  Non-distended.  --STANDING EXAMINATION:  Normal lumbar lordosis noted, no lateral shift.  --MUSCULOSKELETAL: Lumbar spine inspection reveals no evidence of deformity. Range of motion is limited in all movements limited in lumbar flexion, extension, lateral rotation. No tenderness to palpation lumbar spine. Straight leg raising in the seated position is negative to radicular pain. Sciatic notch non-tender.  --SACROILIAC JOINT: One Finger point test negative.  --GROSS MOTOR: Gait is non-antalgic. Easily arises from a seated position. Toe walking and heel walking are normal without significant difficulty.    --LOWER EXTREMITY MOTOR TESTING: Strength testing very difficult to differentiate true weakness versus pain, no clear neurologic weakness.  Plantar flexion left 5/5, right 5/5   Dorsiflexion left 5/5, right 5/5   Great toe MTP extension left 5/5, right 5/5  Knee flexion left 5/5, right 5/5  Knee extension left 5/5, right 5/5   Hip flexion left 5/5, right 5/5  --HIPS: Full range of motion bilaterally.  --NEUROLOGICAL:  2/4 patellar, medial hamstring, and achilles reflexes bilaterally.  Sensation to light touch is intact in the bilateral L4, L5, and S1 dermatomes. Babinski is negative. No clonus.  Negative Juan reflex bilaterally.  --VASCULAR: Bilateral lower extremities are warm.  There is no pitting edema of the bilateral lower extremities.    RESULTS: Prior medical records from [facility] Everywhere were reviewed today.    Imaging: Lumbar spine Imaging was personally reviewed and interpreted today. The images were shown to the patient and the findings were explained using a spine model.               Again, thank you for allowing me to participate in the care of your patient.        Sincerely,        Daily Goldberg, CNP

## 2021-08-03 ENCOUNTER — APPOINTMENT (OUTPATIENT)
Dept: CT IMAGING | Facility: HOSPITAL | Age: 51
End: 2021-08-03
Attending: STUDENT IN AN ORGANIZED HEALTH CARE EDUCATION/TRAINING PROGRAM
Payer: COMMERCIAL

## 2021-08-03 ENCOUNTER — HOSPITAL ENCOUNTER (EMERGENCY)
Facility: HOSPITAL | Age: 51
Discharge: HOME | End: 2021-08-03
Attending: STUDENT IN AN ORGANIZED HEALTH CARE EDUCATION/TRAINING PROGRAM | Admitting: STUDENT IN AN ORGANIZED HEALTH CARE EDUCATION/TRAINING PROGRAM
Payer: COMMERCIAL

## 2021-08-03 ENCOUNTER — APPOINTMENT (OUTPATIENT)
Dept: RADIOLOGY | Facility: HOSPITAL | Age: 51
End: 2021-08-03
Attending: STUDENT IN AN ORGANIZED HEALTH CARE EDUCATION/TRAINING PROGRAM
Payer: COMMERCIAL

## 2021-08-03 VITALS
HEART RATE: 115 BPM | BODY MASS INDEX: 22.02 KG/M2 | WEIGHT: 129 LBS | RESPIRATION RATE: 20 BRPM | OXYGEN SATURATION: 99 % | DIASTOLIC BLOOD PRESSURE: 61 MMHG | HEIGHT: 64 IN | SYSTOLIC BLOOD PRESSURE: 125 MMHG | TEMPERATURE: 98.5 F

## 2021-08-03 DIAGNOSIS — R05.9 COUGH: ICD-10-CM

## 2021-08-03 DIAGNOSIS — Z53.9 DIAGNOSIS NOT YET DEFINED: Primary | ICD-10-CM

## 2021-08-03 DIAGNOSIS — J45.41 MODERATE PERSISTENT ASTHMA WITH EXACERBATION: ICD-10-CM

## 2021-08-03 DIAGNOSIS — R06.02 SHORTNESS OF BREATH: ICD-10-CM

## 2021-08-03 LAB
ANION GAP SERPL CALCULATED.3IONS-SCNC: 12 MMOL/L (ref 5–18)
BASOPHILS # BLD AUTO: 0 10E3/UL (ref 0–0.2)
BASOPHILS NFR BLD AUTO: 0 %
BUN SERPL-MCNC: 9 MG/DL (ref 8–22)
C REACTIVE PROTEIN LHE: 1 MG/DL (ref 0–0.8)
CALCIUM SERPL-MCNC: 9.3 MG/DL (ref 8.5–10.5)
CHLORIDE BLD-SCNC: 106 MMOL/L (ref 98–107)
CO2 SERPL-SCNC: 22 MMOL/L (ref 22–31)
CREAT SERPL-MCNC: 0.67 MG/DL (ref 0.6–1.1)
EOSINOPHIL # BLD AUTO: 0 10E3/UL (ref 0–0.7)
EOSINOPHIL NFR BLD AUTO: 0 %
ERYTHROCYTE [DISTWIDTH] IN BLOOD BY AUTOMATED COUNT: 14.2 % (ref 10–15)
GFR SERPL CREATININE-BSD FRML MDRD: >90 ML/MIN/1.73M2
GLUCOSE BLD-MCNC: 203 MG/DL (ref 70–125)
HCT VFR BLD AUTO: 37.8 % (ref 35–47)
HGB BLD-MCNC: 12 G/DL (ref 11.7–15.7)
IMM GRANULOCYTES # BLD: 0 10E3/UL
IMM GRANULOCYTES NFR BLD: 0 %
LYMPHOCYTES # BLD AUTO: 1 10E3/UL (ref 0.8–5.3)
LYMPHOCYTES NFR BLD AUTO: 11 %
MCH RBC QN AUTO: 26.7 PG (ref 26.5–33)
MCHC RBC AUTO-ENTMCNC: 31.7 G/DL (ref 31.5–36.5)
MCV RBC AUTO: 84 FL (ref 78–100)
MONOCYTES # BLD AUTO: 0.3 10E3/UL (ref 0–1.3)
MONOCYTES NFR BLD AUTO: 4 %
NEUTROPHILS # BLD AUTO: 8.2 10E3/UL (ref 1.6–8.3)
NEUTROPHILS NFR BLD AUTO: 85 %
NRBC # BLD AUTO: 0 10E3/UL
NRBC BLD AUTO-RTO: 0 /100
PLATELET # BLD AUTO: 171 10E3/UL (ref 150–450)
POTASSIUM BLD-SCNC: 3.9 MMOL/L (ref 3.5–5)
PROCALCITONIN SERPL-MCNC: 0.05 NG/ML (ref 0–0.49)
RBC # BLD AUTO: 4.49 10E6/UL (ref 3.8–5.2)
SARS-COV-2 RNA RESP QL NAA+PROBE: NEGATIVE
SODIUM SERPL-SCNC: 140 MMOL/L (ref 136–145)
TROPONIN I SERPL-MCNC: <0.01 NG/ML (ref 0–0.29)
WBC # BLD AUTO: 9.5 10E3/UL (ref 4–11)

## 2021-08-03 PROCEDURE — 99285 EMERGENCY DEPT VISIT HI MDM: CPT | Mod: 25

## 2021-08-03 PROCEDURE — 85025 COMPLETE CBC W/AUTO DIFF WBC: CPT | Performed by: STUDENT IN AN ORGANIZED HEALTH CARE EDUCATION/TRAINING PROGRAM

## 2021-08-03 PROCEDURE — 36415 COLL VENOUS BLD VENIPUNCTURE: CPT | Performed by: STUDENT IN AN ORGANIZED HEALTH CARE EDUCATION/TRAINING PROGRAM

## 2021-08-03 PROCEDURE — 71275 CT ANGIOGRAPHY CHEST: CPT

## 2021-08-03 PROCEDURE — C9803 HOPD COVID-19 SPEC COLLECT: HCPCS

## 2021-08-03 PROCEDURE — 96374 THER/PROPH/DIAG INJ IV PUSH: CPT | Mod: 59

## 2021-08-03 PROCEDURE — 250N000011 HC RX IP 250 OP 636: Performed by: STUDENT IN AN ORGANIZED HEALTH CARE EDUCATION/TRAINING PROGRAM

## 2021-08-03 PROCEDURE — 71045 X-RAY EXAM CHEST 1 VIEW: CPT

## 2021-08-03 PROCEDURE — 96361 HYDRATE IV INFUSION ADD-ON: CPT

## 2021-08-03 PROCEDURE — 87635 SARS-COV-2 COVID-19 AMP PRB: CPT | Performed by: EMERGENCY MEDICINE

## 2021-08-03 PROCEDURE — 86141 C-REACTIVE PROTEIN HS: CPT | Performed by: STUDENT IN AN ORGANIZED HEALTH CARE EDUCATION/TRAINING PROGRAM

## 2021-08-03 PROCEDURE — 80048 BASIC METABOLIC PNL TOTAL CA: CPT | Performed by: STUDENT IN AN ORGANIZED HEALTH CARE EDUCATION/TRAINING PROGRAM

## 2021-08-03 PROCEDURE — 84484 ASSAY OF TROPONIN QUANT: CPT | Performed by: STUDENT IN AN ORGANIZED HEALTH CARE EDUCATION/TRAINING PROGRAM

## 2021-08-03 PROCEDURE — 258N000003 HC RX IP 258 OP 636: Performed by: STUDENT IN AN ORGANIZED HEALTH CARE EDUCATION/TRAINING PROGRAM

## 2021-08-03 PROCEDURE — 84145 PROCALCITONIN (PCT): CPT | Performed by: STUDENT IN AN ORGANIZED HEALTH CARE EDUCATION/TRAINING PROGRAM

## 2021-08-03 PROCEDURE — 93005 ELECTROCARDIOGRAM TRACING: CPT | Performed by: STUDENT IN AN ORGANIZED HEALTH CARE EDUCATION/TRAINING PROGRAM

## 2021-08-03 PROCEDURE — 250N000013 HC RX MED GY IP 250 OP 250 PS 637: Performed by: STUDENT IN AN ORGANIZED HEALTH CARE EDUCATION/TRAINING PROGRAM

## 2021-08-03 RX ORDER — DEXTROMETHORPHAN POLISTIREX 30 MG/5ML
60 SUSPENSION ORAL 2 TIMES DAILY
Qty: 140 ML | Refills: 0 | Status: SHIPPED | OUTPATIENT
Start: 2021-08-03 | End: 2021-08-10

## 2021-08-03 RX ORDER — METHYLPREDNISOLONE SODIUM SUCCINATE 125 MG/2ML
125 INJECTION, POWDER, LYOPHILIZED, FOR SOLUTION INTRAMUSCULAR; INTRAVENOUS ONCE
Status: COMPLETED | OUTPATIENT
Start: 2021-08-03 | End: 2021-08-03

## 2021-08-03 RX ORDER — IOPAMIDOL 755 MG/ML
100 INJECTION, SOLUTION INTRAVASCULAR ONCE
Status: COMPLETED | OUTPATIENT
Start: 2021-08-03 | End: 2021-08-03

## 2021-08-03 RX ORDER — PREDNISONE 20 MG/1
TABLET ORAL
Qty: 19 TABLET | Refills: 0 | Status: SHIPPED | OUTPATIENT
Start: 2021-08-03 | End: 2021-08-11

## 2021-08-03 RX ORDER — IOPAMIDOL 755 MG/ML
100 INJECTION, SOLUTION INTRAVASCULAR ONCE
Status: DISCONTINUED | OUTPATIENT
Start: 2021-08-03 | End: 2021-08-03 | Stop reason: CLARIF

## 2021-08-03 RX ORDER — DEXTROMETHORPHAN POLISTIREX 30 MG/5ML
30 SUSPENSION ORAL ONCE
Status: COMPLETED | OUTPATIENT
Start: 2021-08-03 | End: 2021-08-03

## 2021-08-03 RX ADMIN — METHYLPREDNISOLONE SODIUM SUCCINATE 125 MG: 125 INJECTION, POWDER, FOR SOLUTION INTRAMUSCULAR; INTRAVENOUS at 12:54

## 2021-08-03 RX ADMIN — SODIUM CHLORIDE 1000 ML: 9 INJECTION, SOLUTION INTRAVENOUS at 12:53

## 2021-08-03 RX ADMIN — DEXTROMETHORPHAN 30 MG: 30 SUSPENSION, EXTENDED RELEASE ORAL at 16:55

## 2021-08-03 RX ADMIN — IOPAMIDOL 100 ML: 755 INJECTION, SOLUTION INTRAVENOUS at 16:14

## 2021-08-03 ASSESSMENT — MIFFLIN-ST. JEOR: SCORE: 1175.14

## 2021-08-03 NOTE — ED PROVIDER NOTES
EMERGENCY DEPARTMENT ENCOUNTER       ED Course & Medical Decision Making     12:23 PM I met with the patient for the initial interview and physical examination. Discussed plan for treatment and workup in the ED.    3:17 PM I rechecked and updated the patient    Final Impression  51 year old female presents for evaluation of a cough has been ongoing for the last week, feeling increasingly short of breath since yesterday.  Endorsing some mild headache, body aches, generalized fatigue.  Unsure if she has had any fevers at home.  Has a history of COPD/asthma, follows with Dr. Duong, last seen in the pulmonary clinic on 6/25 and was started on a short prednisone course.  Patient states she is on albuterol, Breo, and Spiriva at home, endorses compliance with all of these.  Patient does not appear hypoxic, saturations 99%, though he is coughing with any attempted deep inspiration.  Lungs actually sound fairly clear to auscultation, only trace coarseness appreciated.  CBC and BMP unremarkable.  Procalcitonin negative.  CRP borderline at 1.0.  Troponin negative and EKG unremarkable.  Patient persistently tachycardic with heart rate of about 100 despite 1 L fluid bolus.  CT PE study negative for PE, some question of nonspecific mosaic attenuation of the lungs, though given her normal CRP and normal white count low clinical suspicion for infectious etiology.  Patient states she has had similar symptoms in the past and they have improved with steroids.  Given 125 mg IV Solu-Medrol and Delsym cough syrup in the ED, does feel like she is improving, is able to ambulate to the bathroom.  Ultimately patient does appear clinically stable for discharge, though given her CT lung changes and her perceived shortness of breath with coughing will discharge home with a a 2-week steroid taper and recommend follow-up with primary for pulmonology.  Plan discussed with patient bedside and family member.  Will discharge home.    CHRISTIAN   used    Prior to making a final disposition on this patient the results of patient's tests and other diagnostic studies were discussed with the patient. All questions were answered. Patient expressed understanding of the plan and was amenable to it.    PPE: Provider wore gloves, N95 mask, eye protection, face shield, and surgical cap and gown.     Medications   dextromethorphan (DELSYM) liquid 30 mg (has no administration in time range)   methylPREDNISolone sodium succinate (solu-MEDROL) injection 125 mg (125 mg Intravenous Given 8/3/21 1254)   0.9% sodium chloride BOLUS (0 mLs Intravenous Stopped 8/3/21 1436)       Final Impression     1. Shortness of breath    2. Cough        Chief Complaint     Chief Complaint   Patient presents with     Shortness of Breath     The patient presents with a cough and SOB. The coughing started approx one week ago. The SOB started yesterday.     HPI   Need : Yes (ANIVAL) Language: [facility]    Kulwant Amin is a 51 year old female with a pertient medical history of asthma, pulmonary nodule, latent TB, COPD, hypertension, hyperthyroidism, hyperlipidemia, and CAD who presents for evaluation of shortness of breath.    The patient reports onset of a cough ~1 week ago and then shortness of breath began today (8/3). Additionally, she endorses a mild headache and myalgias as well. She notes this feels somewhat similar to a previous COPD exacerbation. She currently is taking albuterol, breo, and Spiriva. She is not vaccinated for COVID-19. The patient denies any sick contacts. Denies any other symptoms or complaints at this time.     Per Chart Review,  6/25/2021 the patient was evaluated at [facility] for asthma follow-up. She was given a 5 day prescription for 20 mg of Prednisone followed by 5 days of 10 mg of prednisone. After that, she returned to her previous 2.5 mg every day. She was also switched from Spiriva Handihaler to Spiriva Respimat    IVikas  Nadine am serving as a scribe to document services personally performed by Dr. Kenneth Reich MD, based on my observation and the provider's statements to me. I, Dr. Kenneth Reich MD attest that Vikas Nadine is acting in a scribe capacity, has observed my performance of the services and has documented them in accordance with my direction.    Past Medical History     Past Medical History:   Diagnosis Date     Acute asthma exacerbation 1/6/2020     Acute blood loss anemia      Anxiety      Arthritis      Asthma      Asthma exacerbation 11/19/2015     Chest wall pain 12/26/2017     COPD (chronic obstructive pulmonary disease) (H)      Coronary artery disease due to lipid rich plaque 1/25/2018     Depression      Diabetes mellitus, type II (H)      Essential hypertension 4/14/2017     Generalized headaches      GERD (gastroesophageal reflux disease)      History of anesthesia complications      Lactic acid acidosis 11/23/2018     Lower GI bleeding      Nonspecific chest pain 12/07/2018     Pneumonia      SVT (supraventricular tachycardia) (H)      TB lung, latent      Past Surgical History:   Procedure Laterality Date     CORONARY STENT PLACEMENT  2018     CV CORONARY ANGIOGRAM N/A 1/25/2018    Procedure: Coronary Angiogram;  Surgeon: Angelo Serrano MD;  Location: [facility];  Service:      [place] ESOPHAGOGASTRODUODENOSCOPY TRANSORAL DIAGNOSTIC N/A 12/10/2018    Procedure: ESOPHAGOGASTRODUODENOSCOPY (EGD);  Surgeon: Eddie Renteria MD;  Location: [facility];  Service: Gastroenterology     WA ESOPHAGOGASTRODUODENOSCOPY TRANSORAL DIAGNOSTIC N/A 12/3/2020    Procedure: ESOPHAGOGASTRODUODENOSCOPY (EGD) with biospies ;  Surgeon: Avi Crow MD;  Location: [facility];  Service: Gastroenterology     [facility] COLONOSCOPY W/WO BRUSH/WASH N/A 12/10/2018    Procedure: COLONOSCOPY with polypectomy using biopsy forceps;  Surgeon: Eddie Renteria MD;  Location: [facility];  Service: Gastroenterology      Family History   Problem Relation Age of Onset     Other - See Comments Mother          of an intestinal problem     Ulcers Father          of gastritis     Breast Cancer No family hx of       Social History     Tobacco Use     Smoking status: Former Smoker     Packs/day: 1.00     Years: 30.00     Pack years: 30.00     Types: Cigarettes, Cigarettes, Cigarettes     Quit date: 2003     Years since quittin.6     Smokeless tobacco: Never Used     Tobacco comment: No passive exposure   Substance Use Topics     Alcohol use: No     Drug use: No       Relevant past medical, surgical, family and social history as documented above, has been reviewed and discussed with patient. No changes or additions, unless otherwise noted in the HPI.    Current Medications     acetaminophen (TYLENOL) 500 MG tablet  albuterol (PROAIR HFA, PROVENTIL HFA, VENTOLIN HFA) 108 (90 BASE) MCG/ACT inhaler  ASPIRIN LOW DOSE 81 MG EC tablet  atorvastatin (LIPITOR) 80 MG tablet  azithromycin (ZITHROMAX) 250 MG tablet  benzonatate (TESSALON) 100 MG capsule  calcium carbonate-vitamin D 500-400 MG-UNIT TABS tablt  celecoxib (CELEBREX) 200 MG capsule  clopidogrel (PLAVIX) 75 MG tablet  colchicine (COLCYRS) 0.6 MG tablet  cyclobenzaprine (FLEXERIL) 5 MG tablet  Dupilumab (DUPIXENT) 200 MG/1.14ML SOSY  ferrous sulfate (FEROSUL) 325 (65 Fe) MG tablet  fluticasone-vilanterol (BREO ELLIPTA) 200-25 MCG/INH inhaler  gabapentin (NEURONTIN) 300 MG capsule  guaiFENesin-codeine (ROBITUSSIN AC) 100-10 MG/5ML SOLN  hydrochlorothiazide (MICROZIDE) 12.5 MG capsule  indomethacin (INDOCIN) 25 MG capsule  insulin aspart (NOVOLOG VIAL) 100 UNITS/ML vial  ipratropium - albuterol 0.5 mg/2.5 mg/3 mL (DUONEB) 0.5-2.5 (3) MG/3ML nebulization  loratadine (CLARITIN) 10 MG tablet  meclizine (ANTIVERT) 25 MG tablet  metFORMIN (GLUCOPHAGE) 850 MG tablet  metoprolol tartrate (LOPRESSOR) 25 MG tablet  mometasone-formoterol (DULERA) 200-5 MCG/ACT oral  "inhaler  montelukast (SINGULAIR) 10 MG tablet  naproxen (NAPROSYN) 375 MG tablet  naproxen (NAPROSYN) 500 MG tablet  omeprazole (PRILOSEC) 20 MG capsule  ondansetron (ZOFRAN-ODT) 4 MG ODT tab  ORDER FOR DME  ORDER FOR DME  ORDER FOR DME  ORDER FOR DME  polyethylene glycol (MIRALAX) powder  polyvinyl alcohol (ARTIFICIAL TEARS) 1.4 % ophthalmic solution  predniSONE (DELTASONE) 10 MG tablet  sucralfate (CARAFATE) 1 GM tablet  tiotropium (SPIRIVA RESPIMAT) 2.5 MCG/ACT inhalation aerosol        Allergies     No Known Allergies    Review of Systems     Constitutional: Denies fever, chills  HENT: Denies sore throat  Respiratory: Positive for shortness of breath and coughing    Cardiovascular: Denies chest pain  GI: Denies abdominal pain, nausea, vomiting, or dark, bloody stools  : Denies hematuria, dysuria, or flank pain  Musculoskeletal: Denies any new back pain. Positive for generalized myalgias  Skin: Denies rashes or wound  Neurologic: Denies new weakness, focal weakness, or sensory changes. Positive for headache    Remainder of systems reviewed, unless noted in HPI all others negative.    Physical Exam     /69   Pulse 104   Temp 98.5  F (36.9  C) (Oral)   Resp 20   Ht 1.626 m (5' 4\")   Wt 58.5 kg (129 lb)   SpO2 97%   BMI 22.14 kg/m    Constitutional: Awake, alert, in no acute distress  Head: Normocephalic, atraumatic.  ENT: Mucous membranes moist.   Eyes: PERRL, EOMI, Conjunctiva normal  Respiratory: Mildly tachypneic, though no acute respiratory distress.  Saturations 99% on room air.  Faint coarseness and wheezing on expiration bilaterally.  Cardiovascular: Sinus tachycardia, rate approximately 105. +2 radial pulses, equal bilaterally. No pitting edema.   GI: Abdomen soft, non-tender to palpation in all 4 quadrants. No guarding or rebound. Bowel sounds intact on all 4 quadrants.   Musculoskeletal: Moves all 4 extremities equally, strength symmetrical on bilateral uppers and lowers.  Integument: Warm, " dry.   Neurologic: Alert & oriented x 3. Normal speech. Grossly normal motor and sensory function. No focal deficits noted.  Psychiatric: Normal mood and affect.    Labs & Imaging     Results for orders placed or performed during the hospital encounter of 08/03/21   XR Chest Port 1 View    Impression    IMPRESSION: Better inspiration. Calcified granuloma in the right midlung again noted. Lungs are otherwise clear. Heart and pulmonary vascularity are normal. Presumed coronary arteries stents.   SARS-COV2 (COVID-19) Virus RT-PCR    Specimen: Nasopharyngeal; Swab   Result Value Ref Range    SARS CoV2 PCR Negative Negative   Basic metabolic panel   Result Value Ref Range    Sodium 140 136 - 145 mmol/L    Potassium 3.9 3.5 - 5.0 mmol/L    Chloride 106 98 - 107 mmol/L    Carbon Dioxide (CO2) 22 22 - 31 mmol/L    Anion Gap 12 5 - 18 mmol/L    Urea Nitrogen 9 8 - 22 mg/dL    Creatinine 0.67 0.60 - 1.10 mg/dL    Calcium 9.3 8.5 - 10.5 mg/dL    Glucose 203 (H) 70 - 125 mg/dL    GFR Estimate >90 >60 mL/min/1.73m2   Result Value Ref Range    Procalcitonin 0.05 0.00 - 0.49 ng/mL   CRP inflammation   Result Value Ref Range    CRP 1.0 (H) 0.0-<0.8 mg/dL   Troponin I (now)   Result Value Ref Range    Troponin I <0.01 0.00 - 0.29 ng/mL   CBC with platelets and differential   Result Value Ref Range    WBC Count 9.5 4.0 - 11.0 10e3/uL    RBC Count 4.49 3.80 - 5.20 10e6/uL    Hemoglobin 12.0 11.7 - 15.7 g/dL    Hematocrit 37.8 35.0 - 47.0 %    MCV 84 78 - 100 fL    MCH 26.7 26.5 - 33.0 pg    MCHC 31.7 31.5 - 36.5 g/dL    RDW 14.2 10.0 - 15.0 %    Platelet Count 171 150 - 450 10e3/uL    % Neutrophils 85 %    % Lymphocytes 11 %    % Monocytes 4 %    % Eosinophils 0 %    % Basophils 0 %    % Immature Granulocytes 0 %    NRBCs per 100 WBC 0 <1 /100    Absolute Neutrophils 8.2 1.6 - 8.3 10e3/uL    Absolute Lymphocytes 1.0 0.8 - 5.3 10e3/uL    Absolute Monocytes 0.3 0.0 - 1.3 10e3/uL    Absolute Eosinophils 0.0 0.0 - 0.7 10e3/uL    Absolute  Basophils 0.0 0.0 - 0.2 10e3/uL    Absolute Immature Granulocytes 0.0 <=0.0 10e3/uL    Absolute NRBCs 0.0 10e3/uL       EKG     No sinus rhythm, rate 95.  .  QRS 94.  QTc 487.  No ST segment elevations or depressions appreciated.     Kenneth Reich MD  08/03/21 1726

## 2021-08-03 NOTE — ED TRIAGE NOTES
The patient presents with a cough and SOB. The coughing started approx one week ago. The SOB started yesterday.

## 2021-08-04 LAB
ATRIAL RATE - MUSE: 95 BPM
DIASTOLIC BLOOD PRESSURE - MUSE: NORMAL MMHG
INTERPRETATION ECG - MUSE: NORMAL
P AXIS - MUSE: 40 DEGREES
PR INTERVAL - MUSE: 146 MS
QRS DURATION - MUSE: 94 MS
QT - MUSE: 388 MS
QTC - MUSE: 487 MS
R AXIS - MUSE: -11 DEGREES
SYSTOLIC BLOOD PRESSURE - MUSE: NORMAL MMHG
T AXIS - MUSE: 31 DEGREES
VENTRICULAR RATE- MUSE: 95 BPM

## 2021-08-04 NOTE — PROGRESS NOTES
Progress Notes by Tamra Duong MD at 6/25/2021  1:00 PM     Author: Tamra Duong MD Service: -- Author Type: Physician    Filed: 8/4/2021  8:36 AM Encounter Date: 6/25/2021 Status: Signed    : Tamra Duong MD (Physician)       Assessment/Plan:    50 y.o.-year-old woman with history of organic fuel exposure in the home; visit was conducted through a [place] .  Her PFTs have been previously noted not to be diagnostic due to poor effort, additionally, she has iron deficiency with significant anemia and is not entirely clear how significantly her anemia is contributing to her shortness of breath.  Was evaluated by Dr. Marie and initiated on Dupixent and has been doing significantly better since then but then had developed some side .  For the present we would recommend;    Continue Breo Ellipta 1 puff daily.  She knows to gargle after using this.    Continue Spiriva.    Continue to use 3 times daily albuterol nebs.    Continue 2.5 mg of prednisone daily.    Continue Protonix 40 mg daily.    Tamra Duong  Pulmonary and Critical Care  [id number]      Subjective:    Patient ID: Ms. Amin is a 49 y.o.female with a past medical history significant for anxiety, arthritis, questionable asthma versus COPD, diabetes, hypertension and a prior history of SVT presents with a follow-up visit for her pulmonary complaints.  She follows with me fairly closely for her moderate-persistent, difficult to control asthma symptoms.    Since initiating Dupixent, has had minimal wheezing and improvement in her shortness of breath but this has not completely resolved.    In the past 4 weeks, how much of the time did your asthma keep you from getting as much done at work, school, or at home?: A little of the time  During the past 4 weeks, how often have you had shortness of breath?: More than once a day  During the past 4 weeks, how often did your asthma symptoms (wheezing, coughing, shortness of breath,  chest tightness or pain) wake you up at night or earlier in the morning?: 4 or more nights a week  During the past 4 weeks, how often have you used your rescue inhaler or nebulizer medication (such as albuterol)?: 3 or more times per day  How would you rate your asthma control during the past 4 weeks?: Poorly controlled  ACT Total Score: (!) 9  In the past 12 months, have you visited the emergency room due to your asthma?: Yes  In the past 12 months, have you been hospitalized due to your asthma?: Yes        Pertinent past medical history:  48-year-old female here for follow-up.  Her breathing is a bit worse than 6 months ago.  She had multiple ER visits and evaluations.  This includes negative PE study.  She had a cath with a 75% LAD lesion which was intervened upon.  Since her catheterization she feels she has more heartburn.  Her breathing is worse with exertion.  Improves with rest.  It is also worse with cold weather.  Warm weather and humid air does not bother.  Symptoms localized to the chest.  Pertinent positives include burning sensation in the chest.  Pertinent negatives include no fever or hemoptysis.    Current Outpatient Medications   Medication Sig Dispense Refill   ? acetaminophen (TYLENOL) 500 MG tablet TAKE 1 PILL BY MOUTH EVERY 6 HOURS AS NEEDED FOR PAIN 90 tablet 10   ? albuterol (PROVENTIL) 2.5 mg /3 mL (0.083 %) nebulizer solution Take 3 mL (2.5 mg total) by nebulization every 6 (six) hours as needed for wheezing. 150 mL 12   ? albuterol (VENTOLIN HFA) 90 mcg/actuation inhaler Inhale 2 puffs every 6 (six) hours as needed for wheezing. 1 Inhaler 12   ? amitriptyline (ELAVIL) 10 MG tablet Take 2 tablets (20 mg total) by mouth at bedtime. 180 tablet 3   ? aspirin 81 MG EC tablet Take 1 tablet (81 mg total) by mouth daily. 90 tablet 3   ? atorvastatin (LIPITOR) 80 MG tablet TAKE 1 TABLET (80 MG TOTAL) BY MOUTH AT BEDTIME FOR CHOLESTEROL 30 tablet 12   ? BD ULTRA-FINE SHORT PEN NEEDLE 31 gauge x  "5/16\" Ndle see administration instructions.     ? benralizumab 30 mg/mL atIn Inject 30 mg under the skin every 2 (two) months. Inject monthly for first 3 doses 1 Syringe 0   ? blood glucose meter (GLUCOMETER) Use 1 each As Directed 3 (three) times a day. Dispense glucometer brand per patient's insurance at pharmacy discretion.(E11.9) 1 each 0   ? blood glucose test strips Use 1 each As Directed 3 (three) times a day. Dispense brand per patient's insurance at pharmacy discretion.(E11.9) 200 strip 11   ? clopidogreL (PLAVIX) 75 mg tablet TAKE 1 TABLET (75 MG TOTAL) BY MOUTH DAILY. 90 tablet 1   ? colchicine 0.6 mg tablet TAKE 1 TABLET (0.6 MG TOTAL) BY MOUTH DAILY 90 tablet 2   ? cyclobenzaprine (FLEXERIL) 10 MG tablet Take 1 tablet (10 mg total) by mouth 3 (three) times a day as needed for muscle spasms. 20 tablet 0   ? DUPIXENT SYRINGE 200 mg/1.14 mL Syrg INJECT 2 SYRINGES (400 MG) UNDER THE SKIN FOR THE FIRST DOSE, THEN INJECT 1 SYRINGE (200 MG) EVERY 14 DAYS THEREAFTER 1 Syringe 0   ? ferrous sulfate 325 (65 FE) MG tablet Take 1 tablet (325 mg total) by mouth daily with breakfast. 90 tablet 3   ? flash glucose scanning reader (FREESTYLE MONICA 14 DAY READER) Misc Use 1 Units As Directed every 8 (eight) hours. 1 each 0   ? flash glucose sensor (FREESTYLE MONICA 14 DAY SENSOR) Kit Use 1 Units As Directed every 14 (fourteen) days. 6 kit 3   ? fluticasone furoate-vilanteroL (BREO ELLIPTA) 200-25 mcg/dose DsDv inhaler Inhale 1 puff daily. 1 each 12   ? gabapentin (NEURONTIN) 300 MG capsule TAKE 1 CAPSULE (300 MG TOTAL) BY MOUTH 2 (TWO) TIMES A DAY FOR NERVE PAIN 180 capsule 3   ? generic lancets Use 1 each As Directed 3 (three) times a day. Dispense brand per patient's insurance at pharmacy discretion.(E11.9) 300 each 3   ? hydroCHLOROthiazide (MICROZIDE) 12.5 mg capsule TAKE 1 CAPSULE (12.5 MG TOTAL) BY MOUTH DAILY FOR BLOOD PRESSURE 90 capsule 2   ? INJECT EASE LANCETS 30 gauge Misc USE 1 EACH AS DIRECTED THREE TIMES A " "DAY *34 DAYS* 100 each 11   ? insulin aspart U-100 (NOVOLOG) 100 unit/mL injection Inject under the skin 3 (three) times a day with meals. Use per sliding scale < 150 - No insulin, 151-200 use 3 U, 201- 250 use 5 U,   251- 300 use 8 U      ? insulin syringe-needle U-100 0.3 mL 30 x 3/8\" Syrg Use as needed sliding scale insulin as directed. 100 Syringe 0   ? loratadine (CLARITIN) 10 mg tablet Take 1 tablet (10 mg total) by mouth daily. 30 tablet 1   ? meclizine (ANTIVERT) 25 mg tablet Take 1 tablet (25 mg total) by mouth 3 (three) times a day as needed for dizziness. 28 tablet 0   ? metFORMIN (GLUCOPHAGE) 850 MG tablet Take 1 tablet (850 mg total) by mouth 2 (two) times a day with meals. 60 tablet 11   ? metoprolol tartrate (LOPRESSOR) 25 MG tablet TAKE 1 TABLET (25 MG TOTAL) BY MOUTH 2 (TWO) TIMES A DAY FOR BLOOD PRESSURE 180 tablet 3   ? montelukast (SINGULAIR) 10 mg tablet TAKE 1 PILL (10 MG TOTAL) BY MOUTH AT BEDTIME FOR ASTHMA 30 tablet 10   ? omeprazole (PRILOSEC) 40 MG capsule Take 40 mg by mouth daily before breakfast.     ? polyethylene glycol (GLYCOLAX) 17 gram/dose powder MIX 17 GRAMS WITH LIQUID AND DRINK ONCE DAILY FOR CONSTIPATION 510 g 12   ? predniSONE (DELTASONE) 2.5 MG tablet 1 tablet daily.     ? SPIRIVA WITH HANDIHALER 18 mcg inhalation capsule INHALE THE CONTENTS OF 1 CAPSULE DAILY 30 capsule 11   ? sucralfate (CARAFATE) 1 gram tablet Take 1 tablet (1 g total) by mouth 4 (four) times a day before meals and at bedtime. Take 1 hour prior to meals 120 tablet 11     No current facility-administered medications for this visit.        Physical Exam   /62   Pulse 97   Ht 5' 0.5\" (1.537 m)   Wt 125 lb (56.7 kg)   SpO2 100%   Breastfeeding No   BMI 24.01 kg/m    Physical Exam   Constitutional: She is oriented to person, place, and time. She appears well-developed and well-nourished.   Tired appearing.   HENT:   Head: Normocephalic.   Eyes: Pupils are equal, round, and reactive to light. "   Neck: Normal range of motion.   Cardiovascular: Normal rate and regular rhythm.   Pulmonary/Chest: Effort normal and breath sounds normal.   Abdominal: Soft.   Musculoskeletal: Normal range of motion.         General: No edema.   Neurological: She is alert and oriented to person, place, and time.   Skin: Skin is warm.   Psychiatric: She has a normal mood and affect.           Tamra Duong MD  Pulmonary and Critical Care  (p) [phone]

## 2021-08-05 ENCOUNTER — IMMUNIZATION (OUTPATIENT)
Dept: NURSING | Facility: CLINIC | Age: 51
End: 2021-08-05
Payer: COMMERCIAL

## 2021-08-05 PROCEDURE — 91301 PR COVID VAC MODERNA 100 MCG/0.5 ML IM: CPT

## 2021-08-05 PROCEDURE — 0011A PR COVID VAC MODERNA 100 MCG/0.5 ML IM: CPT

## 2021-08-06 ENCOUNTER — TELEPHONE (OUTPATIENT)
Dept: PULMONOLOGY | Facility: OTHER | Age: 51
End: 2021-08-06

## 2021-08-06 ENCOUNTER — NURSE TRIAGE (OUTPATIENT)
Dept: NURSING | Facility: CLINIC | Age: 51
End: 2021-08-06

## 2021-08-06 NOTE — TELEPHONE ENCOUNTER
Triage call:   Son calling to help patient set up ER follow-up - with patient call  ER visit- related to asthma per son  COVID negative in the ER  Son reports her symptoms are not worse since seen in the ER   Reports she is still coughing but her symptoms are over all improved  Taking medications as prescribed   Recommended follow up per the ER is by 8/10 (one week)     Assisted in transferring to scheduling no available appointments within the recommended follow up- scheduled an appointment 8/17/21. Provider please advise- is patient able to be seen sooner? Please advise.     Doris Funk RN BSN 8/6/2021 9:46 AM    COVID 19 Nurse Triage Plan/Patient Instructions    Please be aware that novel coronavirus (COVID-19) may be circulating in the community. If you develop symptoms such as fever, cough, or SOB or if you have concerns about the presence of another infection including coronavirus (COVID-19), please contact your health care provider or visit [email or web address].     Disposition/Instructions    In-Person Visit with provider recommended. Reference Visit Selection Guide.    Thank you for taking steps to prevent the spread of this virus.  o Limit your contact with others.  o Wear a simple mask to cover your cough.  o Wash your hands well and often.    Resources    [facility]: About COVID-19: [email or web address]    CDC: What to Do If You're Sick: www.cdc.gov/coronavirus/2019-ncov/about/steps-when-sick.html    CDC: Ending Home Isolation: www.cdc.gov/coronavirus/2019-ncov/hcp/disposition-in-home-patients.html     CDC: Caring for Someone: www.cdc.gov/coronavirus/2019-ncov/if-you-are-sick/care-for-someone.html     [facility]: Interim Guidance for Hospital Discharge to Home: www.health.[facility].[place]/diseases/coronavirus/hcp/hospdischarge.pdf    [facility] clinical trials (COVID-19 research studies): [email or web address]/umn-[email or web address]-trials     Below are the COVID-19 hotlines at  the [place] Department of Health ([facility]). Interpreters are available.   o For health questions: Call 809-591-8731 or 1-267.467.1508 (7 a.m. to 7 p.m.)  o For questions about schools and childcare: Call 327-196-6605 or 1-669.404.1767 (7 a.m. to 7 p.m.)       Reason for Disposition    Caller has NON-URGENT question (includes prescribed medication questions) and triager unable to answer    Additional Information    Negative: Shock suspected (e.g., cold/pale/clammy skin, too weak to stand, low BP, rapid pulse)    Negative: Difficult to awaken or acting confused (e.g., disoriented, slurred speech)    Negative: Sounds like a life-threatening emergency to the triager    Negative: Recent (within last 24 hours) medical visit for an injury    Negative: Recent surgery or surgical procedure    Negative: Asthma attack diagnosed recently    Negative: Sinus infection and taking an antibiotic    Negative: Flu (influenza) diagnosed recently    Negative: Taking antibiotic for other infection    Negative: Strep throat test result    Negative: Threatened miscarriage (threatened ) recently diagnosed    Negative: More than 24 hours since medical visit    Negative: Drinking very little and dehydration suspected (e.g., no urine > 12 hours, very dry mouth, very lightheaded)    Negative: Patient sounds very sick or weak to the triager    Negative: Fever > 104 F (40 C)    Negative: Caller has URGENT question and triager unable to answer question    Negative: SEVERE pain (e.g., excruciating, pain scale 8-10) and not improved after pain medications    Negative: Recent medical visit within 24 hours AND condition/symptoms WORSE    Negative: Recent medical visit within 24 hours and NEW symptom that could be serious    Negative: Caller has URGENT question (includes prescribed medication questions) and triager unable to answer question    Negative: Patient wants to be seen    Negative: Recent medical visit within 24 hours and NEW onset of  fever and HCP said to call if this occurred    Protocols used: RECENT MEDICAL VISIT FOR ILLNESS FOLLOW-UP CALL-A-OH

## 2021-08-09 ENCOUNTER — ANCILLARY PROCEDURE (OUTPATIENT)
Dept: MAMMOGRAPHY | Facility: CLINIC | Age: 51
End: 2021-08-09
Attending: FAMILY MEDICINE
Payer: COMMERCIAL

## 2021-08-09 DIAGNOSIS — Z12.31 VISIT FOR SCREENING MAMMOGRAM: ICD-10-CM

## 2021-08-09 PROCEDURE — 77067 SCR MAMMO BI INCL CAD: CPT | Mod: TC | Performed by: RADIOLOGY

## 2021-08-11 ENCOUNTER — OFFICE VISIT (OUTPATIENT)
Dept: PULMONOLOGY | Facility: OTHER | Age: 51
End: 2021-08-11
Payer: COMMERCIAL

## 2021-08-11 VITALS
BODY MASS INDEX: 21.52 KG/M2 | HEART RATE: 83 BPM | WEIGHT: 125.4 LBS | OXYGEN SATURATION: 98 % | SYSTOLIC BLOOD PRESSURE: 112 MMHG | DIASTOLIC BLOOD PRESSURE: 70 MMHG

## 2021-08-11 DIAGNOSIS — J44.9 CHRONIC OBSTRUCTIVE PULMONARY DISEASE, UNSPECIFIED COPD TYPE (H): ICD-10-CM

## 2021-08-11 DIAGNOSIS — J45.50 SEVERE PERSISTENT ASTHMA WITHOUT COMPLICATION (H): Primary | ICD-10-CM

## 2021-08-11 PROCEDURE — 99214 OFFICE O/P EST MOD 30 MIN: CPT | Performed by: INTERNAL MEDICINE

## 2021-08-11 RX ORDER — ALBUTEROL SULFATE 0.83 MG/ML
2.5 SOLUTION RESPIRATORY (INHALATION) EVERY 6 HOURS PRN
COMMUNITY
Start: 2021-07-06 | End: 2022-06-15

## 2021-08-11 RX ORDER — BENRALIZUMAB 30 MG/ML
INJECTION, SOLUTION SUBCUTANEOUS
COMMUNITY
Start: 2021-07-27 | End: 2021-08-11

## 2021-08-11 RX ORDER — AMITRIPTYLINE HYDROCHLORIDE 10 MG/1
2 TABLET ORAL AT BEDTIME
COMMUNITY
Start: 2021-08-09 | End: 2022-05-02

## 2021-08-11 RX ORDER — OMEPRAZOLE 40 MG/1
1 CAPSULE, DELAYED RELEASE ORAL DAILY
COMMUNITY
Start: 2021-07-03 | End: 2021-11-30

## 2021-08-11 RX ORDER — IPRATROPIUM BROMIDE AND ALBUTEROL SULFATE 2.5; .5 MG/3ML; MG/3ML
3 SOLUTION RESPIRATORY (INHALATION) EVERY 6 HOURS PRN
Qty: 240 ML | Refills: 11 | Status: SHIPPED | OUTPATIENT
Start: 2021-08-11 | End: 2022-08-26

## 2021-08-11 RX ORDER — PREDNISONE 2.5 MG/1
5 TABLET ORAL DAILY
Qty: 60 TABLET | Refills: 11 | Status: SHIPPED | OUTPATIENT
Start: 2021-08-11 | End: 2021-09-10

## 2021-08-11 RX ORDER — PREDNISONE 2.5 MG/1
5 TABLET ORAL DAILY
COMMUNITY
Start: 2021-02-15 | End: 2021-08-11

## 2021-08-11 RX ORDER — METFORMIN HYDROCHLORIDE 750 MG/1
750 TABLET, EXTENDED RELEASE ORAL
COMMUNITY
Start: 2021-07-06 | End: 2021-11-09

## 2021-08-11 RX ORDER — LORATADINE 10 MG/1
10 TABLET ORAL DAILY
COMMUNITY
End: 2021-12-17

## 2021-08-11 NOTE — PROGRESS NOTES
Assessment/Plan:    50 y.o.-year-old woman with history of organic fuel exposure in the home; visit was conducted through a [place] .  Her PFTs have been previously noted not to be diagnostic due to poor effort, additionally, she has iron deficiency with significant anemia and is not entirely clear how significantly her anemia is contributing to her shortness of breath.  Was evaluated by Dr. Marie and initiated on Dupixent and has been doing significantly better since then.  For the present we would recommend;    Continue Breo Ellipta 1 puff daily.  She knows to gargle after using this.    Continue Spiriva.    Continue to use 3 times daily albuterol nebs.    Increase prednisone to 5 mg of prednisone daily.    Continue Protonix 40 mg daily.    She is up-to-date with her influenza vaccination.    Continue Dupixent injections.    Referral to pulmonary rehab.    She has not yet received her Covid-19 vaccine and I will clarify with Dr. Marie if it is okay for her to receive this but from my perspective she is at high risk if she does contract Covid-19 and should certainly be vaccinated.    Tamra Scruggsqvi  Pulmonary and Critical Care  [id number]      Subjective:    Patient ID: Ms. Amin is a 49 y.o.female with a past medical history significant for anxiety, arthritis, questionable asthma versus COPD, diabetes, hypertension and a prior history of SVT presents with a follow-up visit for her pulmonary complaints.  She follows with me fairly closely for her moderate-persistent, difficult to control asthma symptoms.  Since her last clinic visit with me, she was seen in the emergency room for worsening shortness of breath but did not have significant findings on exam.  She was administered steroids and discharged from the ER.  Patient is also noted to have helped her Dupixent injections which she was concerned about an allergic reaction manifesting as whole body itching.  Dr. Marie did review literature extensively does not  feel that this is a side effect of Dupixent but recommended continuation of this especially given that the patient had improvement of her wheezing with this.    Pertinent past medical history:  48-year-old female here for follow-up.  Her breathing is a bit worse than 6 months ago.  She had multiple ER visits and evaluations.  This includes negative PE study.  She had a cath with a 75% LAD lesion which was intervened upon.  Since her catheterization she feels she has more heartburn.  Her breathing is worse with exertion.  Improves with rest.  It is also worse with cold weather.  Warm weather and humid air does not bother.  Symptoms localized to the chest.  Pertinent positives include burning sensation in the chest.  Pertinent negatives include no fever or hemoptysis.    Current Outpatient Medications   Medication Sig Dispense Refill     acetaminophen (TYLENOL) 500 MG tablet Take 2 tablets (1,000 mg) by mouth every 8 hours 100 tablet 0     albuterol (PROAIR HFA, PROVENTIL HFA, VENTOLIN HFA) 108 (90 BASE) MCG/ACT inhaler Inhale 2 puffs into the lungs every 4 hours as needed for shortness of breath / dyspnea 1 Inhaler 4     albuterol (PROVENTIL) (2.5 MG/3ML) 0.083% neb solution Inhale 2.5 mg into the lungs       ASPIRIN LOW DOSE 81 MG EC tablet        atorvastatin (LIPITOR) 80 MG tablet TAKE 1 TABLET (80 MG TOTAL) BY MOUTH AT BEDTIME FOR CHOLESTEROL       clopidogrel (PLAVIX) 75 MG tablet Take 1 tablet (75 mg) by mouth daily 90 tablet 3     colchicine (COLCYRS) 0.6 MG tablet TAKE 1 TABLET (0.6 MG TOTAL) BY MOUTH DAILY       ferrous sulfate (FEROSUL) 325 (65 Fe) MG tablet Take 325 mg by mouth       fluticasone-vilanterol (BREO ELLIPTA) 200-25 MCG/INH inhaler Inhale 1 puff into the lungs       gabapentin (NEURONTIN) 300 MG capsule Take 2 capsules (600 mg) by mouth 3 times daily 180 capsule 3     hydrochlorothiazide (MICROZIDE) 12.5 MG capsule Take 1 capsule (12.5 mg) by mouth daily 30 capsule 6     metFORMIN  (GLUCOPHAGE-XR) 750 MG 24 hr tablet Take 750 mg by mouth daily (with breakfast)       metoprolol tartrate (LOPRESSOR) 25 MG tablet TAKE 1 TABLET (25 MG TOTAL) BY MOUTH 2 (TWO) TIMES A DAY FOR BLOOD PRESSURE       montelukast (SINGULAIR) 10 MG tablet Take 1 tablet (10 mg) by mouth At Bedtime 30 tablet 6     predniSONE (DELTASONE) 2.5 MG tablet Take 1 tablet by mouth daily        sucralfate (CARAFATE) 1 GM tablet Take 1 g by mouth       tiotropium (SPIRIVA RESPIMAT) 2.5 MCG/ACT inhalation aerosol Inhale 2 puffs into the lungs daily 4 g 1     amitriptyline (ELAVIL) 10 MG tablet 2 tablets At Bedtime       benzonatate (TESSALON) 100 MG capsule Take 1 capsule (100 mg) by mouth 3 times daily as needed for cough 42 capsule 1     calcium carbonate-vitamin D 500-400 MG-UNIT TABS tablt Take 1 tablet by mouth 2 times daily (Patient not taking: Reported on 2/19/2021) 180 tablet 3     celecoxib (CELEBREX) 200 MG capsule        cyclobenzaprine (FLEXERIL) 5 MG tablet Take 1-2 tablets (5-10 mg) by mouth 3 times daily as needed for muscle spasms 30 tablet 3     Dupilumab (DUPIXENT) 200 MG/1.14ML SOSY Inject 200 mg Subcutaneous every 14 days 1.14 mL 6     FASENRA PEN 30 MG/ML SOAJ auto-injector pen        guaiFENesin-codeine (ROBITUSSIN AC) 100-10 MG/5ML SOLN Take 10 mLs by mouth every 6 hours as needed for cough (Patient not taking: Reported on 2/19/2021) 120 mL 0     indomethacin (INDOCIN) 25 MG capsule        insulin aspart (NOVOLOG VIAL) 100 UNITS/ML vial        ipratropium - albuterol 0.5 mg/2.5 mg/3 mL (DUONEB) 0.5-2.5 (3) MG/3ML nebulization Take 1 vial (3 mLs) by nebulization every 6 hours as needed for shortness of breath / dyspnea or wheezing 90 vial 1     meclizine (ANTIVERT) 25 MG tablet Take 25 mg by mouth       naproxen (NAPROSYN) 375 MG tablet Take 1 tablet (375 mg) by mouth 2 times daily (with meals) 30 tablet 0     naproxen (NAPROSYN) 500 MG tablet Take 1 tablet (500 mg) by mouth 2 times daily as needed for moderate  "pain 30 tablet 1     omeprazole (PRILOSEC) 20 MG capsule Take 1 capsule (20 mg) by mouth daily 30 capsule 12     omeprazole (PRILOSEC) 40 MG DR capsule 1 capsule daily       ondansetron (ZOFRAN-ODT) 4 MG ODT tab        polyethylene glycol (MIRALAX) powder Take 17 g by mouth daily 510 g 1     polyvinyl alcohol (ARTIFICIAL TEARS) 1.4 % ophthalmic solution Place 1 drop into both eyes as needed for dry eyes (Patient not taking: Reported on 2/19/2021) 15 mL 3       Physical Exam   /62   Pulse 80   Ht 5' 1.5\" (1.562 m)   Wt 128 lb (58.1 kg)   SpO2 99%   Breastfeeding No   BMI 23.79 kg/m    Physical Exam   Constitutional: She is oriented to person, place, and time. She appears well-developed and well-nourished.   Tired appearing.   HENT:   Head: Normocephalic.   Eyes: Pupils are equal, round, and reactive to light.   Neck: Normal range of motion.   Cardiovascular: Normal rate and regular rhythm.   Pulmonary/Chest: Effort normal and breath sounds normal.   Abdominal: Soft.   Musculoskeletal: Normal range of motion.         General: No edema.   Neurological: She is alert and oriented to person, place, and time.   Skin: Skin is warm.   Psychiatric: She has a normal mood and affect.           Tamra Duong MD  Pulmonary and Critical Care  (p) [phone]        "

## 2021-08-11 NOTE — PATIENT INSTRUCTIONS
I have placed a referral for you for pulmonary rehab. We will try to see if this helps with your exercise tolerance.    I have increased your prednisone to 5mg daily from 2.5mg daily.

## 2021-08-12 ENCOUNTER — HOSPITAL ENCOUNTER (EMERGENCY)
Facility: HOSPITAL | Age: 51
Discharge: HOME | End: 2021-08-12
Attending: EMERGENCY MEDICINE | Admitting: EMERGENCY MEDICINE
Payer: COMMERCIAL

## 2021-08-12 ENCOUNTER — NURSE TRIAGE (OUTPATIENT)
Dept: NURSING | Facility: CLINIC | Age: 51
End: 2021-08-12

## 2021-08-12 VITALS
SYSTOLIC BLOOD PRESSURE: 123 MMHG | DIASTOLIC BLOOD PRESSURE: 79 MMHG | TEMPERATURE: 98 F | BODY MASS INDEX: 21.46 KG/M2 | OXYGEN SATURATION: 99 % | WEIGHT: 125 LBS | HEART RATE: 75 BPM | RESPIRATION RATE: 23 BRPM

## 2021-08-12 DIAGNOSIS — R73.9 HYPERGLYCEMIA: ICD-10-CM

## 2021-08-12 DIAGNOSIS — E87.1 HYPONATREMIA: ICD-10-CM

## 2021-08-12 DIAGNOSIS — E87.5 HYPERKALEMIA: ICD-10-CM

## 2021-08-12 LAB
ALBUMIN UR-MCNC: NEGATIVE MG/DL
ANION GAP SERPL CALCULATED.3IONS-SCNC: 11 MMOL/L (ref 5–18)
ANION GAP SERPL CALCULATED.3IONS-SCNC: 7 MMOL/L (ref 5–18)
APPEARANCE UR: CLEAR
BACTERIA #/AREA URNS HPF: ABNORMAL /HPF
BASOPHILS # BLD AUTO: 0 10E3/UL (ref 0–0.2)
BASOPHILS NFR BLD AUTO: 0 %
BILIRUB UR QL STRIP: NEGATIVE
BUN SERPL-MCNC: 7 MG/DL (ref 8–22)
BUN SERPL-MCNC: 9 MG/DL (ref 8–22)
CALCIUM SERPL-MCNC: 8.6 MG/DL (ref 8.5–10.5)
CALCIUM SERPL-MCNC: 9.5 MG/DL (ref 8.5–10.5)
CHLORIDE BLD-SCNC: 101 MMOL/L (ref 98–107)
CHLORIDE BLD-SCNC: 109 MMOL/L (ref 98–107)
CO2 SERPL-SCNC: 22 MMOL/L (ref 22–31)
CO2 SERPL-SCNC: 24 MMOL/L (ref 22–31)
COLOR UR AUTO: COLORLESS
CREAT SERPL-MCNC: 0.61 MG/DL (ref 0.6–1.1)
CREAT SERPL-MCNC: 0.81 MG/DL (ref 0.6–1.1)
EOSINOPHIL # BLD AUTO: 0 10E3/UL (ref 0–0.7)
EOSINOPHIL NFR BLD AUTO: 0 %
ERYTHROCYTE [DISTWIDTH] IN BLOOD BY AUTOMATED COUNT: 14.6 % (ref 10–15)
GFR SERPL CREATININE-BSD FRML MDRD: 83 ML/MIN/1.73M2
GFR SERPL CREATININE-BSD FRML MDRD: >90 ML/MIN/1.73M2
GLUCOSE BLD-MCNC: 142 MG/DL (ref 70–125)
GLUCOSE BLD-MCNC: 389 MG/DL (ref 70–125)
GLUCOSE BLDC GLUCOMTR-MCNC: 195 MG/DL (ref 70–125)
GLUCOSE BLDC GLUCOMTR-MCNC: 345 MG/DL (ref 70–125)
GLUCOSE UR STRIP-MCNC: 1000 MG/DL
HCT VFR BLD AUTO: 39.7 % (ref 35–47)
HGB BLD-MCNC: 12.6 G/DL (ref 11.7–15.7)
HGB UR QL STRIP: NEGATIVE
IMM GRANULOCYTES # BLD: 0.1 10E3/UL
IMM GRANULOCYTES NFR BLD: 1 %
KETONES UR STRIP-MCNC: NEGATIVE MG/DL
LEUKOCYTE ESTERASE UR QL STRIP: NEGATIVE
LYMPHOCYTES # BLD AUTO: 3.4 10E3/UL (ref 0.8–5.3)
LYMPHOCYTES NFR BLD AUTO: 33 %
MCH RBC QN AUTO: 26.6 PG (ref 26.5–33)
MCHC RBC AUTO-ENTMCNC: 31.7 G/DL (ref 31.5–36.5)
MCV RBC AUTO: 84 FL (ref 78–100)
MONOCYTES # BLD AUTO: 0.6 10E3/UL (ref 0–1.3)
MONOCYTES NFR BLD AUTO: 6 %
MUCOUS THREADS #/AREA URNS LPF: PRESENT /LPF
NEUTROPHILS # BLD AUTO: 6.2 10E3/UL (ref 1.6–8.3)
NEUTROPHILS NFR BLD AUTO: 60 %
NITRATE UR QL: NEGATIVE
NRBC # BLD AUTO: 0 10E3/UL
NRBC BLD AUTO-RTO: 0 /100
PH UR STRIP: 5 [PH] (ref 5–7)
PLATELET # BLD AUTO: 246 10E3/UL (ref 150–450)
POTASSIUM BLD-SCNC: 3.6 MMOL/L (ref 3.5–5)
POTASSIUM BLD-SCNC: 5.2 MMOL/L (ref 3.5–5)
RBC # BLD AUTO: 4.73 10E6/UL (ref 3.8–5.2)
RBC URINE: 0 /HPF
SODIUM SERPL-SCNC: 134 MMOL/L (ref 136–145)
SODIUM SERPL-SCNC: 140 MMOL/L (ref 136–145)
SP GR UR STRIP: 1 (ref 1–1.03)
SQUAMOUS EPITHELIAL: <1 /HPF
UROBILINOGEN UR STRIP-MCNC: <2 MG/DL
WBC # BLD AUTO: 10.3 10E3/UL (ref 4–11)
WBC URINE: <1 /HPF

## 2021-08-12 PROCEDURE — 80048 BASIC METABOLIC PNL TOTAL CA: CPT | Performed by: STUDENT IN AN ORGANIZED HEALTH CARE EDUCATION/TRAINING PROGRAM

## 2021-08-12 PROCEDURE — 36415 COLL VENOUS BLD VENIPUNCTURE: CPT | Performed by: EMERGENCY MEDICINE

## 2021-08-12 PROCEDURE — 81001 URINALYSIS AUTO W/SCOPE: CPT | Performed by: EMERGENCY MEDICINE

## 2021-08-12 PROCEDURE — 258N000003 HC RX IP 258 OP 636: Performed by: EMERGENCY MEDICINE

## 2021-08-12 PROCEDURE — 96374 THER/PROPH/DIAG INJ IV PUSH: CPT

## 2021-08-12 PROCEDURE — 93005 ELECTROCARDIOGRAM TRACING: CPT | Performed by: EMERGENCY MEDICINE

## 2021-08-12 PROCEDURE — 36415 COLL VENOUS BLD VENIPUNCTURE: CPT | Performed by: STUDENT IN AN ORGANIZED HEALTH CARE EDUCATION/TRAINING PROGRAM

## 2021-08-12 PROCEDURE — 85025 COMPLETE CBC W/AUTO DIFF WBC: CPT | Performed by: EMERGENCY MEDICINE

## 2021-08-12 PROCEDURE — 93005 ELECTROCARDIOGRAM TRACING: CPT

## 2021-08-12 PROCEDURE — 250N000012 HC RX MED GY IP 250 OP 636 PS 637: Performed by: EMERGENCY MEDICINE

## 2021-08-12 PROCEDURE — 99284 EMERGENCY DEPT VISIT MOD MDM: CPT | Mod: 25

## 2021-08-12 PROCEDURE — 85025 COMPLETE CBC W/AUTO DIFF WBC: CPT | Performed by: STUDENT IN AN ORGANIZED HEALTH CARE EDUCATION/TRAINING PROGRAM

## 2021-08-12 PROCEDURE — 96361 HYDRATE IV INFUSION ADD-ON: CPT

## 2021-08-12 PROCEDURE — 80048 BASIC METABOLIC PNL TOTAL CA: CPT | Performed by: EMERGENCY MEDICINE

## 2021-08-12 RX ORDER — SODIUM CHLORIDE 9 MG/ML
INJECTION, SOLUTION INTRAVENOUS CONTINUOUS
Status: DISCONTINUED | OUTPATIENT
Start: 2021-08-12 | End: 2021-08-12

## 2021-08-12 RX ORDER — SODIUM CHLORIDE 9 MG/ML
INJECTION, SOLUTION INTRAVENOUS CONTINUOUS
Status: DISCONTINUED | OUTPATIENT
Start: 2021-08-12 | End: 2021-08-13 | Stop reason: HOSPADM

## 2021-08-12 RX ADMIN — SODIUM CHLORIDE 1000 ML: 9 INJECTION, SOLUTION INTRAVENOUS at 21:32

## 2021-08-12 RX ADMIN — SODIUM CHLORIDE: 9 INJECTION, SOLUTION INTRAVENOUS at 22:40

## 2021-08-12 RX ADMIN — HUMAN INSULIN 5 UNITS: 100 INJECTION, SOLUTION SUBCUTANEOUS at 21:40

## 2021-08-12 ASSESSMENT — ENCOUNTER SYMPTOMS
PALPITATIONS: 1
LIGHT-HEADEDNESS: 1
FREQUENCY: 1
SHORTNESS OF BREATH: 0

## 2021-08-12 NOTE — TELEPHONE ENCOUNTER
Ok to keep 8/17/21 appointment. Call us if symptoms worsen before appointment time.     Dr. Adrien Cardoza   8/12/2021 10:39 AM     Provider reviewed- no additional action at this time, closing encounter.     Doris Funk RN BSN 8/12/2021 10:44 AM

## 2021-08-12 NOTE — TELEPHONE ENCOUNTER
Original requested had indicated provider advice was requested. Encounter was routed to a scheduling pool. Unsure if provider had looked at encounter so routing directly to provider to review- high priority.     Doris Funk RN BSN 8/12/2021 10:32 AM

## 2021-08-12 NOTE — TELEPHONE ENCOUNTER
Son is calling and interpreting for patient. Patient about blood sugar of 411. Patient is out of her novolog insulin for 2 years. Patient states she is feeling dizzy and having some rapid breathing.   Protocol directs to ED Now. Son will drive and bring a list of current medications.  Rowan Rivas RN   08/12/21 6:22 PM  [facility] Nurse Advisor  COVID 19 Nurse Triage Plan/Patient Instructions    Please be aware that novel coronavirus (COVID-19) may be circulating in the community. If you develop symptoms such as fever, cough, or SOB or if you have concerns about the presence of another infection including coronavirus (COVID-19), please contact your health care provider or visit [email or web address].     Disposition/Instructions    ED Visit recommended. Follow protocol based instructions.     Bring Your Own Device:  Please also bring your smart device(s) (smart phones, tablets, laptops) and their charging cables for your personal use and to communicate with your care team during your visit.    Thank you for taking steps to prevent the spread of this virus.  o Limit your contact with others.  o Wear a simple mask to cover your cough.  o Wash your hands well and often.    Resources    [facility]: About COVID-19: [email or web address]    CDC: What to Do If You're Sick: www.cdc.gov/coronavirus/2019-ncov/about/steps-when-sick.html    CDC: Ending Home Isolation: www.cdc.gov/coronavirus/2019-ncov/hcp/disposition-in-home-patients.html     CDC: Caring for Someone: www.cdc.gov/coronavirus/2019-ncov/if-you-are-sick/care-for-someone.html     [facility]: Interim Guidance for Hospital Discharge to Home: www.health.[facility].[place]/diseases/coronavirus/hcp/hospdischarge.pdf    [facility] clinical trials (COVID-19 research studies): [email or web address]-[email or web address]     Below are the COVID-19 hotlines at the [place] Department of Health ([facility]). Interpreters are available.   o For [employer]  questions: Call 895-751-5990 or 1-216.178.2592 (7 a.m. to 7 p.m.)  o For questions about schools and childcare: Call 838-866-5420 or 1-788.648.6969 (7 a.m. to 7 p.m.)     Reason for Disposition    [1] Blood glucose > 240 mg/dL (13.3 mmol/L) AND [2] rapid breathing    Additional Information    Negative: Unconscious or difficult to awaken    Negative: Acting confused (e.g., disoriented, slurred speech)    Negative: Very weak (e.g., can't stand)    Negative: Sounds like a life-threatening emergency to the triager    Negative: [1] Vomiting AND [2] signs of dehydration (e.g., very dry mouth, lightheaded, dark urine)    Protocols used: DIABETES - HIGH BLOOD SUGAR-A-AH

## 2021-08-13 ENCOUNTER — NURSE TRIAGE (OUTPATIENT)
Dept: NURSING | Facility: CLINIC | Age: 51
End: 2021-08-13

## 2021-08-13 DIAGNOSIS — E11.9 TYPE 2 DIABETES MELLITUS TREATED WITHOUT INSULIN (H): Primary | ICD-10-CM

## 2021-08-13 RX ORDER — INSULIN ASPART 100 [IU]/ML
INJECTION, SOLUTION INTRAVENOUS; SUBCUTANEOUS
Qty: 15 ML | Refills: 0 | Status: SHIPPED | OUTPATIENT
Start: 2021-08-13 | End: 2022-01-11

## 2021-08-13 NOTE — TELEPHONE ENCOUNTER
Last office visit: 07/06/21 DR. CARDOZA   Next appointment: With Family Practice (Adrien Cardoza MD)  08/17/2021 at 2:00 PM    Chart reviewed. Please review findings below.     Kulwant Amin is a 51 year old female with a past medical history of COPD, GERT, SVT, DMT2, anxiety, depression, who presents to the ED for evaluation of hyperglycemia.      The patient reports an elevated blood sugar of 427 today. She states that she used to be on insulin as needed but ran out over a year ago, she is now on pills to control her diabetes. She endorses, lightheadedness, frequency with urination, thirst, and palpitations secondary to her hyperglycemia. She is otherwise in her usual state of health and denies any chest pain, shortness of breath, pain in legs, or any other concerns at this time.      Follow-Ups: Schedule an appointment with Adrien Cardoza MD (Family Medicine)

## 2021-08-13 NOTE — ED PROVIDER NOTES
Emergency Department Encounter      NAME: uKlwant Amin  AGE: 51 year old female  YOB: 1970  MRN: [id number]  EVALUATION DATE & TIME: 2021  9:04 PM    PCP: Adrien Cardoza    ED PROVIDER: Joni Johnson M.D.      Chief Complaint   Patient presents with     Hyperglycemia     FINAL IMPRESSION:  1. Hyperglycemia    2. Hyponatremia    3. Hyperkalemia        MEDICAL DECISION MAKIN:15 PM I met with the patient, obtained history, performed an initial exam, and discussed options and plan for diagnostics and treatment here in the ED. I saw the patient wearing an eye shield, surgical mask, gown, and gloves.   10:29 PM I rechecked on the patient.   11:47 PM We discussed the plan for discharge and the patient is agreeable. Reviewed supportive cares, symptomatic treatment, outpatient follow up, and reasons to return to the Emergency Department. Patient to be discharged by ED RN.   Pertinent Labs & Imaging studies reviewed. (See chart for details)     The patient is a 51-year-old female with a history of diabetes who presents with elevated blood sugar today.  Her family checked it today it was 427.  She said that she had previously been using insulin but is now only using oral hypoglycemics to control her blood sugar.  She is experiencing polyuria and polydipsia.  She has described having some lightheadedness and palpitations.  On exam in the ER she did not have an odor of ketones on her breath.  Her vital signs were stable.  Her lab work was checked and she is found to be slightly hyponatremic and hyperkalemic.  She was treated with IV fluid as well as IV regular insulin.  This helps improve the glucose and likely will resolve the mild hyperkalemia.  The patient was not in DKA and was not acidotic.  She and her family are comfortable being discharged home and following up with her primary care doctor as an outpatient to discuss this matter as she did not need to be admitted at this point.    The importance  of close follow up was discussed. We reviewed warning signs and symptoms, and I instructed Ms. Amin to return to the emergency department immediately if she develops any new or worsening symptoms. I provided additional verbal discharge instructions. Ms. Amin expressed understanding and agreement with this plan of care, her questions were answered, and she was discharged in stable condition.     MEDICATIONS GIVEN IN THE EMERGENCY:  Medications   0.9% sodium chloride BOLUS (0 mLs Intravenous Stopped 8/12/21 2240)     Followed by   sodium chloride 0.9% infusion ( Intravenous Stopped 8/12/21 2350)   insulin (regular) (HumuLIN R/NovoLIN R) injection 5 Units (5 Units Intravenous Given 8/12/21 2140)       NEW PRESCRIPTIONS STARTED AT TODAY'S ER VISIT:  Discharge Medication List as of 8/12/2021 11:50 PM             =================================================================    HPI    Patient information was obtained from: Patient    Use of : Yes (In person) - Language Mongolian        Kulwant JEANNINE Amin is a 51 year old female with a past medical history of COPD, GERT, SVT, DMT2, anxiety, depression, who presents to the ED for evaluation of hyperglycemia.     The patient reports an elevated blood sugar of 427 today. She states that she used to be on insulin as needed but ran out over a year ago, she is now on pills to control her diabetes. She endorses, lightheadedness, frequency with urination, thirst, and palpitations secondary to her hyperglycemia. She is otherwise in her usual state of health and denies any chest pain, shortness of breath, pain in legs, or any other concerns at this time.       REVIEW OF SYSTEMS   Review of Systems   Respiratory: Negative for shortness of breath.    Cardiovascular: Positive for palpitations. Negative for chest pain.   Genitourinary: Positive for frequency.   Musculoskeletal:        Negative for leg pain.      Neurological: Positive for light-headedness.   All other systems  reviewed and are negative.       PAST MEDICAL HISTORY:  Past Medical History:   Diagnosis Date     Acute asthma exacerbation 1/6/2020     Acute blood loss anemia      Anxiety      Arthritis      Asthma      Asthma exacerbation 11/19/2015     Chest wall pain 12/26/2017    Added automatically from request for surgery [id number]     COPD (chronic obstructive pulmonary disease) (H)      Coronary artery disease due to lipid rich plaque 1/25/2018     Depression      Diabetes mellitus, type II (H)      Essential hypertension 4/14/2017     Generalized headaches      GERD (gastroesophageal reflux disease)      History of anesthesia complications     nausea and vomiting     Lactic acid acidosis 11/23/2018     Lower GI bleeding      Nonspecific chest pain 12/07/2018    2 negative stress tests since coronary stenting in 2018; in June 2020 she had 1 week of this pain with normal cardiac enzymes and relief with Toradol     Pneumonia      SVT (supraventricular tachycardia) (H)      TB lung, latent     9 mos INH       PAST SURGICAL HISTORY:  Past Surgical History:   Procedure Laterality Date     CORONARY STENT PLACEMENT  2018     CV CORONARY ANGIOGRAM N/A 1/25/2018    Procedure: Coronary Angiogram;  Surgeon: Angelo Serrano MD;  Location: [facility] Cath Lab;  Service:      MO ESOPHAGOGASTRODUODENOSCOPY TRANSORAL DIAGNOSTIC N/A 12/10/2018    Procedure: ESOPHAGOGASTRODUODENOSCOPY (EGD);  Surgeon: Eddie Renteria MD;  Location: [facility];  Service: Gastroenterology     MO ESOPHAGOGASTRODUODENOSCOPY TRANSORAL DIAGNOSTIC N/A 12/3/2020    Procedure: ESOPHAGOGASTRODUODENOSCOPY (EGD) with biospies ;  Surgeon: Avi Crow MD;  Location: [facility];  Service: Gastroenterology     [facility] COLONOSCOPY W/WO BRUSH/WASH N/A 12/10/2018    Procedure: COLONOSCOPY with polypectomy using biopsy forceps;  Surgeon: Eddie Renteria MD;  Location: [facility];  Service: Gastroenterology       CURRENT MEDICATIONS:      Current  Facility-Administered Medications:      [COMPLETED] 0.9% sodium chloride BOLUS, 1,000 mL, Intravenous, Once, Stopped at 08/12/21 2240 **FOLLOWED BY** sodium chloride 0.9% infusion, , Intravenous, Continuous, Joni Johnson MD, Stopped at 08/12/21 3250    Current Outpatient Medications:      acetaminophen (TYLENOL) 500 MG tablet, Take 2 tablets (1,000 mg) by mouth every 8 hours, Disp: 100 tablet, Rfl: 0     albuterol (PROAIR HFA, PROVENTIL HFA, VENTOLIN HFA) 108 (90 BASE) MCG/ACT inhaler, Inhale 2 puffs into the lungs every 4 hours as needed for shortness of breath / dyspnea, Disp: 1 Inhaler, Rfl: 4     albuterol (PROVENTIL) (2.5 MG/3ML) 0.083% neb solution, Inhale 2.5 mg into the lungs, Disp: , Rfl:      amitriptyline (ELAVIL) 10 MG tablet, 2 tablets At Bedtime, Disp: , Rfl:      ASPIRIN LOW DOSE 81 MG EC tablet, , Disp: , Rfl:      atorvastatin (LIPITOR) 80 MG tablet, TAKE 1 TABLET (80 MG TOTAL) BY MOUTH AT BEDTIME FOR CHOLESTEROL, Disp: , Rfl:      clopidogrel (PLAVIX) 75 MG tablet, Take 1 tablet (75 mg) by mouth daily, Disp: 90 tablet, Rfl: 3     colchicine (COLCYRS) 0.6 MG tablet, TAKE 1 TABLET (0.6 MG TOTAL) BY MOUTH DAILY, Disp: , Rfl:      cyclobenzaprine (FLEXERIL) 5 MG tablet, Take 1-2 tablets (5-10 mg) by mouth 3 times daily as needed for muscle spasms, Disp: 30 tablet, Rfl: 3     Dupilumab (DUPIXENT) 200 MG/1.14ML SOSY, Inject 200 mg Subcutaneous every 14 days, Disp: 1.14 mL, Rfl: 6     ferrous sulfate (FEROSUL) 325 (65 Fe) MG tablet, Take 325 mg by mouth, Disp: , Rfl:      fluticasone-vilanterol (BREO ELLIPTA) 200-25 MCG/INH inhaler, Inhale 1 puff into the lungs, Disp: , Rfl:      gabapentin (NEURONTIN) 300 MG capsule, Take 2 capsules (600 mg) by mouth 3 times daily, Disp: 180 capsule, Rfl: 3     guaiFENesin-codeine (ROBITUSSIN AC) 100-10 MG/5ML SOLN, Take 10 mLs by mouth every 6 hours as needed for cough, Disp: 120 mL, Rfl: 0     hydrochlorothiazide (MICROZIDE) 12.5 MG capsule, Take 1 capsule (12.5  mg) by mouth daily, Disp: 30 capsule, Rfl: 6     insulin aspart (NOVOLOG VIAL) 100 UNITS/ML vial, , Disp: , Rfl:      ipratropium - albuterol 0.5 mg/2.5 mg/3 mL (DUONEB) 0.5-2.5 (3) MG/3ML neb solution, Take 1 vial (3 mLs) by nebulization every 6 hours as needed for shortness of breath / dyspnea or wheezing, Disp: 240 mL, Rfl: 11     loratadine (CLARITIN) 10 MG tablet, Take 10 mg by mouth daily, Disp: , Rfl:      meclizine (ANTIVERT) 25 MG tablet, Take 25 mg by mouth daily as needed , Disp: , Rfl:      metFORMIN (GLUCOPHAGE-XR) 750 MG 24 hr tablet, Take 750 mg by mouth daily (with breakfast), Disp: , Rfl:      metoprolol tartrate (LOPRESSOR) 25 MG tablet, TAKE 1 TABLET (25 MG TOTAL) BY MOUTH 2 (TWO) TIMES A DAY FOR BLOOD PRESSURE, Disp: , Rfl:      montelukast (SINGULAIR) 10 MG tablet, Take 1 tablet (10 mg) by mouth At Bedtime, Disp: 30 tablet, Rfl: 6     omeprazole (PRILOSEC) 40 MG DR capsule, 1 capsule daily, Disp: , Rfl:      polyethylene glycol (MIRALAX) powder, Take 17 g by mouth daily, Disp: 510 g, Rfl: 1     polyvinyl alcohol (ARTIFICIAL TEARS) 1.4 % ophthalmic solution, Place 1 drop into both eyes as needed for dry eyes, Disp: 15 mL, Rfl: 3     predniSONE (DELTASONE) 2.5 MG tablet, Take 2 tablets (5 mg) by mouth daily, Disp: 60 tablet, Rfl: 11     sucralfate (CARAFATE) 1 GM tablet, Take 1 g by mouth, Disp: , Rfl:      tiotropium (SPIRIVA RESPIMAT) 2.5 MCG/ACT inhalation aerosol, Inhale 2 puffs into the lungs daily, Disp: 4 g, Rfl: 1    ALLERGIES:  No Known Allergies    FAMILY HISTORY:  Family History   Problem Relation Age of Onset     Other - See Comments Mother          of an intestinal problem     Ulcers Father          of gastritis     Breast Cancer No family hx of        SOCIAL HISTORY:   Social History     Socioeconomic History     Marital status:      Spouse name: Not on file     Number of children: Not on file     Years of education: Not on file     Highest education level: Not on file    Occupational History     Not on file   Tobacco Use     Smoking status: Former Smoker     Packs/day: 1.00     Years: 30.00     Pack years: 30.00     Types: Cigarettes, Cigarettes, Cigarettes     Quit date: 2003     Years since quittin.6     Smokeless tobacco: Never Used     Tobacco comment: No passive exposure   Substance and Sexual Activity     Alcohol use: No     Drug use: No     Sexual activity: Yes     Partners: Male   Other Topics Concern     Parent/sibling w/ CABG, MI or angioplasty before 65F 55M? Not Asked   Social History Narrative    2017 The patient lives with her daughter-in-law (who is present), , son, and 2 grandchildren (total of 6 people). [place].     Social Determinants of Health     Financial Resource Strain:      Difficulty of Paying Living Expenses:    Food Insecurity:      Worried About Running Out of Food in the Last Year:      Ran Out of Food in the Last Year:    Transportation Needs:      Lack of Transportation (Medical):      Lack of Transportation (Non-Medical):    Physical Activity:      Days of Exercise per Week:      Minutes of Exercise per Session:    Stress:      Feeling of Stress :    Social Connections:      Frequency of Communication with Friends and Family:      Frequency of Social Gatherings with Friends and Family:      Attends [employer] Services:      Active Member of Clubs or Organizations:      Attends Club or Organization Meetings:      Marital Status:    Intimate Partner Violence:      Fear of Current or Ex-Partner:      Emotionally Abused:      Physically Abused:      Sexually Abused:        PHYSICAL EXAM:    Vitals: /79   Pulse 75   Temp 98  F (36.7  C) (Oral)   Resp 23   Wt 56.7 kg (125 lb)   SpO2 99%   BMI 21.46 kg/m     Constitutional: Well developed, well nourished. Comfortable appearing.  HENT: Normocephalic, atraumatic, mucous membranes moist, nose normal. Neck- Supple, gross ROM intact.   Eyes: Pupils mid-range, sclera white, no  discharge  Respiratory: Coarse lung sounds over left chest, cleared with a cough. Speaks full sentences easily.  Cardiovascular: Normal heart rate, regular rhythm, no murmurs. No lower extremity edema, 2+ DP pulses.   GI: Soft, no tenderness to deep palpation in all quadrants, no masses.  Musculoskeletal: Moving all 4 extremities intentionally and without pain. No obvious deformity.  Skin: Warm, dry, no rash.  Neurologic: Alert & oriented x 3, speech clear, moving all extremities spontaneously   Psychiatric: Affect normal, cooperative.     LAB:  All pertinent labs reviewed and interpreted.  Labs Ordered and Resulted from Time of ED Arrival Up to the Time of Departure from the ED   BASIC METABOLIC PANEL - Abnormal; Notable for the following components:       Result Value    Sodium 134 (*)     Potassium 5.2 (*)     Glucose 389 (*)     All other components within normal limits   ROUTINE UA WITH MICROSCOPIC REFLEX TO CULTURE - Abnormal; Notable for the following components:    Glucose Urine 1000  (*)     Bacteria Urine Few (*)     Mucus Urine Present (*)     All other components within normal limits    Narrative:     Urine Culture not indicated   CBC WITH PLATELETS AND DIFFERENTIAL - Abnormal; Notable for the following components:    Absolute Immature Granulocytes 0.1 (*)     All other components within normal limits   GLUCOSE BY METER - Abnormal; Notable for the following components:    GLUCOSE BY METER POCT 345 (*)     All other components within normal limits   GLUCOSE BY METER - Abnormal; Notable for the following components:    GLUCOSE BY METER POCT 195 (*)     All other components within normal limits   BASIC METABOLIC PANEL - Abnormal; Notable for the following components:    Chloride 109 (*)     Urea Nitrogen 7 (*)     Glucose 142 (*)     All other components within normal limits   GLUCOSE MONITOR NURSING POCT   GLUCOSE MONITOR NURSING POCT   CBC WITH PLATELETS & DIFFERENTIAL    Narrative:     The following  orders were created for panel order CBC with Platelets & Differential.  Procedure                               Abnormality         Status                     ---------                               -----------         ------                     CBC with platelets and d...[[id number]]  Abnormal            Final result                 Please view results for these tests on the individual orders.   GLUCOSE MONITOR NURSING POCT   GLUCOSE MONITOR NURSING POCT   PERIPHERAL IV CATHETER       RADIOLOGY:  No orders to display       EKG:   Performed at: 21:37:29  Impression: Sinus rhythm. Left axis deviation. Abnormal ECG.   Rate: 77  Rhythm: Sinus  QRS Interval: 84  QTc Interval: 452  Comparison: 03-AUG-2021 No significant change was found.   I have independently reviewed and interpreted the EKG(s) documented above.     I, Miquel Killian, am serving as a scribe to document services personally performed by Dr. Joni Johnson based on my observation and the provider's statements to me. IJoni M.D. attest that Miquel Killian is acting in a scribe capacity, has observed my performance of the services and has documented them in accordance with my direction.      Joni Johnson M.D.  Emergency Medicine  [facility] EMERGENCY DEPARTMENT  [facility] [facility] [address] [phone]  Dept: [phone]     Joni Johnson MD  09/02/21 3938

## 2021-08-13 NOTE — TELEPHONE ENCOUNTER
RN triage   Call fro, pt son Rhys-- no signed consent for son --   Offered  - initially declined -- then accepted   W/  -- per pt == high blood sugar yesterday [ 427 per son] -- and seen in ED   Today blood sugar 190   Pt states she is feeling still some dizziness -- no fainting   No nausea --   Drinking fluids OK -- UO. OK   No fever   Pt states she still has sore throat - x 3 days  - especially when coughing -- swallow and breathing OK -- breathing getting better now per pt   Pt states she had covid shot last week   Pt states she ran out of insulin / has  insulin  And does not know dosing   Pharmacy = [facility]     Reviewed home care advice     Please advise :  Insulin refill   And do you want pt seen / throat culture     Rakel Armstrong RN  [facility]  Triage Nurse Advisor      Reason for Disposition    Caller has URGENT medication or insulin pump question and triager unable to answer question    Additional Information    Negative: Unconscious or difficult to awaken    Negative: Acting confused (e.g., disoriented, slurred speech)    Negative: Very weak (can't stand)    Negative: Sounds like a life-threatening emergency to the triager    Negative: Vomiting and signs of dehydration (e.g., very dry mouth, lightheaded, dark urine)    Negative: Blood glucose > 240 mg/dL (13.3 mmol/L) and rapid breathing    Negative: Vomiting lasting > 4 hours    Negative: Fever > 100.4 F (38.0 C)    Negative: Blood glucose > 400 mg/dL (22.2 mmol/L)    Negative: Blood glucose > 300 mg/dL (16.7 mmol/L) AND two or more times in a row    Negative: Urine ketones moderate - large (or blood ketones > 1.4 mmol/L)    Protocols used: DIABETES - HIGH BLOOD SUGAR-A-OH

## 2021-08-13 NOTE — DISCHARGE INSTRUCTIONS
Continue to check your blood sugars frequently.    Refill your insulin and use it as prescribed.    Make sure you stay hydrated by drinking extra fluids today and tomorrow.

## 2021-08-13 NOTE — TELEPHONE ENCOUNTER
Novolog to use for sliding scale insulin ( with instructions )sent to pharmacy. Please call the patient.    Dr. Adrien Cardoza  8/13/2021 12:38 PM

## 2021-08-13 NOTE — ED TRIAGE NOTES
Here with family. Believes that she is a type 2 diabetic. States that her blood sugar was in the 400 range at home. Has insulin that she uses sometimes. Did use the insulin tonight but states that it is . Also states that she feels a little dizzy.

## 2021-08-17 ENCOUNTER — OFFICE VISIT (OUTPATIENT)
Dept: FAMILY MEDICINE | Facility: CLINIC | Age: 51
End: 2021-08-17
Payer: COMMERCIAL

## 2021-08-17 VITALS
DIASTOLIC BLOOD PRESSURE: 60 MMHG | BODY MASS INDEX: 21.08 KG/M2 | RESPIRATION RATE: 20 BRPM | WEIGHT: 123.5 LBS | HEART RATE: 84 BPM | SYSTOLIC BLOOD PRESSURE: 100 MMHG | TEMPERATURE: 98.2 F | HEIGHT: 64 IN

## 2021-08-17 DIAGNOSIS — R05.9 COUGH: ICD-10-CM

## 2021-08-17 DIAGNOSIS — I63.50 RIGHT PONTINE STROKE (H): ICD-10-CM

## 2021-08-17 DIAGNOSIS — Z79.4 TYPE 2 DIABETES MELLITUS TREATED WITH INSULIN (H): Primary | ICD-10-CM

## 2021-08-17 DIAGNOSIS — F32.1 MODERATE MAJOR DEPRESSION (H): ICD-10-CM

## 2021-08-17 DIAGNOSIS — E11.9 TYPE 2 DIABETES MELLITUS TREATED WITH INSULIN (H): Primary | ICD-10-CM

## 2021-08-17 LAB
HBA1C MFR BLD: 9.2 % (ref 0–5.6)
HOLD SPECIMEN: NORMAL

## 2021-08-17 PROCEDURE — 83036 HEMOGLOBIN GLYCOSYLATED A1C: CPT | Performed by: FAMILY MEDICINE

## 2021-08-17 PROCEDURE — 36415 COLL VENOUS BLD VENIPUNCTURE: CPT | Performed by: FAMILY MEDICINE

## 2021-08-17 PROCEDURE — 99214 OFFICE O/P EST MOD 30 MIN: CPT | Performed by: FAMILY MEDICINE

## 2021-08-17 RX ORDER — GUAIFENESIN 200 MG/10ML
200 LIQUID ORAL EVERY 4 HOURS PRN
Qty: 200 ML | Refills: 1 | Status: SHIPPED | OUTPATIENT
Start: 2021-08-17 | End: 2023-03-21

## 2021-08-17 ASSESSMENT — MIFFLIN-ST. JEOR: SCORE: 1150.19

## 2021-08-17 NOTE — PROGRESS NOTES
ASSESMENT AND PLAN:    Type 2 diabetes mellitus treated with insulin (H)  A1c 9.2 today.  Normal creatinine 5 days ago.  She will continue Metformin  mg daily.  We will start Lantus 10 units daily.  Continue sliding scale insulin as needed.  - Hemoglobin A1c  - Extra Tube; Future  - Extra Tube  - insulin glargine (LANTUS PEN) 100 UNIT/ML pen; Inject 10 Units Subcutaneous At Bedtime  - insulin pen needle (32G X 4 MM) 32G X 4 MM miscellaneous; Use one pen needles daily or as directed.    Right pontine stroke (H)  No residual weakness.    Moderate major depression (H)  Stable.    Cough  Chronic, requesting cough syrup.  - guaiFENesin (ROBITUSSIN) 100 MG/5ML liquid; Take 10 mLs (200 mg) by mouth every 4 hours as needed for cough    This transcription uses voice recognition software, which may contain typographical errors.      SUBJECTIVE: Kulwant Amin is a 51-year-old female with history of multiple chronic conditions including but not limited to uncontrolled diabetes, coronary artery disease, hypertension, hyperlipidemia, uncontrolled asthma migraine headache, h/o stroke and others here for ED follow-up.  She went to the ED 5 days ago with elevated blood sugar in the 400s.  She takes Metformin  mg daily.  Sliding scale insulin was added recently.  Her last A1c was 5.9 in 5/21.  Most recent fasting blood sugar are in the 200s to 400 range.  Daughter-in-law has been giving her sliding scale insulin.  She does not have no new physical complaints other than ongoing pain, difficulty breathing etc.    Past Medical History:   Diagnosis Date     Acute asthma exacerbation 1/6/2020     Acute blood loss anemia      Anxiety      Arthritis      Asthma      Asthma exacerbation 11/19/2015     Chest wall pain 12/26/2017    Added automatically from request for surgery [id number]     COPD (chronic obstructive pulmonary disease) (H)      Coronary artery disease due to lipid rich plaque 1/25/2018     Depression      Diabetes  mellitus, type II (H)      Essential hypertension 4/14/2017     Generalized headaches      GERD (gastroesophageal reflux disease)      History of anesthesia complications     nausea and vomiting     Lactic acid acidosis 11/23/2018     Lower GI bleeding      Nonspecific chest pain 12/07/2018    2 negative stress tests since coronary stenting in 2018; in June 2020 she had 1 week of this pain with normal cardiac enzymes and relief with Toradol     Pneumonia      SVT (supraventricular tachycardia) (H)      TB lung, latent     9 mos INH     Patient Active Problem List   Diagnosis     Pulmonary nodule, right     Environmental allergies     TB lung, latent     COPD (chronic obstructive pulmonary disease)/Asthma      HTN (hypertension)     Esophageal reflux (GERD)     Bronchiectasis (H)     Hyperthyroidism     Cataracts, bilateral     Corneal opacity     Irregular astigmatism     Chest pain     Acculturation difficulty     Anxiety     Asthma     Cerebrovascular accident of right pontine structure (H)     Chronic nonintractable headache, unspecified headache type     Coronary artery disease due to lipid rich plaque     Cough     Dizziness     Elevated troponin     Hematuria     Hyperlipidemia     Microcytic anemia     Migraine headache     Moderate major depression (H)     Moderate persistent asthma     Nonspecific (abnormal) findings on radiological and other examination of other intrathoracic organs     Nonspecific reaction to tuberculin skin test without active tuberculosis     Personal history of underimmunization status     Pneumonia     Type 2 diabetes mellitus treated without insulin (H)     Unspecified visual loss     Chronic obstructive pulmonary disease, unspecified COPD type (H)     Dyspnea     SOB (shortness of breath)     Essential hypertension     GERD (gastroesophageal reflux disease)     Pre-syncope     Latent tuberculosis     Dental caries     Asthma with exacerbation     Nonspecific chest pain  "      Allergies:  No Known Allergies    History   Smoking Status     Former Smoker     Packs/day: 1.00     Years: 30.00     Types: Cigarettes, Cigarettes, Cigarettes     Quit date: 1/1/2003   Smokeless Tobacco     Never Used     Comment: No passive exposure       Review of systems otherwise negative except as listed in HPI.   History   Smoking Status     Former Smoker     Packs/day: 1.00     Years: 30.00     Types: Cigarettes, Cigarettes, Cigarettes     Quit date: 1/1/2003   Smokeless Tobacco     Never Used     Comment: No passive exposure       OBJECTICE: /60 (BP Location: Right arm, Patient Position: Sitting, Cuff Size: Adult Regular)   Pulse 84   Temp 98.2  F (36.8  C) (Oral)   Resp 20   Ht 1.626 m (5' 4\")   Wt 56 kg (123 lb 8 oz)   BMI 21.20 kg/m      DATA REVIEWED:  Additional History from Old Records Summarized (2):   Radiology Tests Summarized or Ordered (1): CT negative for PE. CXR  Was benign  Labs Reviewed or Ordered (1):       GEN-alert,  in no apparent distress.  HEENT-mucous membranes are moist, neck is supple.  CV-regular rate and rhythm with no murmur.   RESP-lungs clear to auscultation .  ABDOMEN- Soft , not tender.  EXTREM- No edema.  SKIN-normal        Adrien Cardoza MD   8/17/2021   "

## 2021-08-18 ENCOUNTER — HOSPITAL ENCOUNTER (EMERGENCY)
Facility: HOSPITAL | Age: 51
Discharge: HOME | End: 2021-08-19
Attending: EMERGENCY MEDICINE | Admitting: EMERGENCY MEDICINE
Payer: COMMERCIAL

## 2021-08-18 VITALS
RESPIRATION RATE: 18 BRPM | DIASTOLIC BLOOD PRESSURE: 74 MMHG | HEART RATE: 97 BPM | SYSTOLIC BLOOD PRESSURE: 128 MMHG | OXYGEN SATURATION: 98 % | TEMPERATURE: 97 F | WEIGHT: 123 LBS | BODY MASS INDEX: 21.11 KG/M2

## 2021-08-18 DIAGNOSIS — R73.9 HYPERGLYCEMIA: ICD-10-CM

## 2021-08-18 LAB
ALBUMIN UR-MCNC: NEGATIVE MG/DL
ANION GAP SERPL CALCULATED.3IONS-SCNC: 11 MMOL/L (ref 5–18)
APPEARANCE UR: CLEAR
BASE EXCESS BLDV CALC-SCNC: -0.1 MMOL/L
BASOPHILS # BLD AUTO: 0 10E3/UL (ref 0–0.2)
BASOPHILS NFR BLD AUTO: 0 %
BILIRUB UR QL STRIP: NEGATIVE
BUN SERPL-MCNC: 13 MG/DL (ref 8–22)
CALCIUM SERPL-MCNC: 9.1 MG/DL (ref 8.5–10.5)
CHLORIDE BLD-SCNC: 98 MMOL/L (ref 98–107)
CO2 SERPL-SCNC: 20 MMOL/L (ref 22–31)
COLOR UR AUTO: COLORLESS
CREAT SERPL-MCNC: 0.92 MG/DL (ref 0.6–1.1)
EOSINOPHIL # BLD AUTO: 0 10E3/UL (ref 0–0.7)
EOSINOPHIL NFR BLD AUTO: 0 %
ERYTHROCYTE [DISTWIDTH] IN BLOOD BY AUTOMATED COUNT: 14.6 % (ref 10–15)
GFR SERPL CREATININE-BSD FRML MDRD: 71 ML/MIN/1.73M2
GLUCOSE BLD-MCNC: 674 MG/DL (ref 70–125)
GLUCOSE BLDC GLUCOMTR-MCNC: >600 MG/DL (ref 70–125)
GLUCOSE UR STRIP-MCNC: >1000 MG/DL
HCO3 BLDV-SCNC: 24 MMOL/L (ref 24–30)
HCT VFR BLD AUTO: 37.2 % (ref 35–47)
HGB BLD-MCNC: 11.8 G/DL (ref 11.7–15.7)
HGB UR QL STRIP: NEGATIVE
IMM GRANULOCYTES # BLD: 0 10E3/UL
IMM GRANULOCYTES NFR BLD: 0 %
KETONES UR STRIP-MCNC: NEGATIVE MG/DL
LACTATE SERPL-SCNC: 2.4 MMOL/L (ref 0.7–2)
LEUKOCYTE ESTERASE UR QL STRIP: NEGATIVE
LIPASE SERPL-CCNC: 47 U/L (ref 0–52)
LYMPHOCYTES # BLD AUTO: 0.8 10E3/UL (ref 0.8–5.3)
LYMPHOCYTES NFR BLD AUTO: 16 %
MAGNESIUM SERPL-MCNC: 2.2 MG/DL (ref 1.8–2.6)
MCH RBC QN AUTO: 26.8 PG (ref 26.5–33)
MCHC RBC AUTO-ENTMCNC: 31.7 G/DL (ref 31.5–36.5)
MCV RBC AUTO: 85 FL (ref 78–100)
MONOCYTES # BLD AUTO: 0.2 10E3/UL (ref 0–1.3)
MONOCYTES NFR BLD AUTO: 4 %
NEUTROPHILS # BLD AUTO: 3.8 10E3/UL (ref 1.6–8.3)
NEUTROPHILS NFR BLD AUTO: 80 %
NITRATE UR QL: NEGATIVE
NRBC # BLD AUTO: 0 10E3/UL
NRBC BLD AUTO-RTO: 0 /100
OXYHGB MFR BLDV: 61.2 % (ref 70–75)
PCO2 BLDV: 43 MM HG (ref 35–50)
PH BLDV: 7.37 [PH] (ref 7.35–7.45)
PH UR STRIP: 6.5 [PH] (ref 5–7)
PLATELET # BLD AUTO: 180 10E3/UL (ref 150–450)
PO2 BLDV: 35 MM HG (ref 25–47)
POTASSIUM BLD-SCNC: 5 MMOL/L (ref 3.5–5)
RBC # BLD AUTO: 4.4 10E6/UL (ref 3.8–5.2)
RBC URINE: 0 /HPF
SAO2 % BLDV: 62.4 % (ref 70–75)
SODIUM SERPL-SCNC: 129 MMOL/L (ref 136–145)
SP GR UR STRIP: 1.02 (ref 1–1.03)
TROPONIN I SERPL-MCNC: <0.01 NG/ML (ref 0–0.29)
UROBILINOGEN UR STRIP-MCNC: <2 MG/DL
WBC # BLD AUTO: 4.8 10E3/UL (ref 4–11)
WBC URINE: <1 /HPF

## 2021-08-18 PROCEDURE — 80048 BASIC METABOLIC PNL TOTAL CA: CPT | Performed by: FAMILY MEDICINE

## 2021-08-18 PROCEDURE — 36415 COLL VENOUS BLD VENIPUNCTURE: CPT | Performed by: FAMILY MEDICINE

## 2021-08-18 PROCEDURE — 85025 COMPLETE CBC W/AUTO DIFF WBC: CPT | Performed by: FAMILY MEDICINE

## 2021-08-18 PROCEDURE — 93005 ELECTROCARDIOGRAM TRACING: CPT | Performed by: FAMILY MEDICINE

## 2021-08-18 PROCEDURE — 82010 KETONE BODYS QUAN: CPT | Performed by: FAMILY MEDICINE

## 2021-08-18 PROCEDURE — 99284 EMERGENCY DEPT VISIT MOD MDM: CPT | Mod: 25

## 2021-08-18 PROCEDURE — 82805 BLOOD GASES W/O2 SATURATION: CPT | Performed by: FAMILY MEDICINE

## 2021-08-18 PROCEDURE — 81001 URINALYSIS AUTO W/SCOPE: CPT | Performed by: FAMILY MEDICINE

## 2021-08-18 PROCEDURE — 83735 ASSAY OF MAGNESIUM: CPT | Performed by: FAMILY MEDICINE

## 2021-08-18 PROCEDURE — 83690 ASSAY OF LIPASE: CPT | Performed by: FAMILY MEDICINE

## 2021-08-18 PROCEDURE — 83605 ASSAY OF LACTIC ACID: CPT | Performed by: FAMILY MEDICINE

## 2021-08-18 PROCEDURE — 258N000003 HC RX IP 258 OP 636: Performed by: EMERGENCY MEDICINE

## 2021-08-18 PROCEDURE — 84484 ASSAY OF TROPONIN QUANT: CPT | Performed by: FAMILY MEDICINE

## 2021-08-18 PROCEDURE — 96361 HYDRATE IV INFUSION ADD-ON: CPT

## 2021-08-18 RX ADMIN — SODIUM CHLORIDE 2000 ML: 9 INJECTION, SOLUTION INTRAVENOUS at 23:06

## 2021-08-19 LAB
ATRIAL RATE - MUSE: 77 BPM
ATRIAL RATE - MUSE: 91 BPM
DIASTOLIC BLOOD PRESSURE - MUSE: NORMAL MMHG
DIASTOLIC BLOOD PRESSURE - MUSE: NORMAL MMHG
GLUCOSE BLDC GLUCOMTR-MCNC: 346 MG/DL (ref 70–125)
GLUCOSE BLDC GLUCOMTR-MCNC: 441 MG/DL (ref 70–125)
INTERPRETATION ECG - MUSE: NORMAL
INTERPRETATION ECG - MUSE: NORMAL
KETONES BLD-SCNC: 0.2 MMOL/L
P AXIS - MUSE: 31 DEGREES
P AXIS - MUSE: 42 DEGREES
PR INTERVAL - MUSE: 148 MS
PR INTERVAL - MUSE: 154 MS
QRS DURATION - MUSE: 84 MS
QRS DURATION - MUSE: 90 MS
QT - MUSE: 394 MS
QT - MUSE: 400 MS
QTC - MUSE: 452 MS
QTC - MUSE: 484 MS
R AXIS - MUSE: -20 DEGREES
R AXIS - MUSE: -34 DEGREES
SYSTOLIC BLOOD PRESSURE - MUSE: NORMAL MMHG
SYSTOLIC BLOOD PRESSURE - MUSE: NORMAL MMHG
T AXIS - MUSE: 28 DEGREES
T AXIS - MUSE: 46 DEGREES
VENTRICULAR RATE- MUSE: 77 BPM
VENTRICULAR RATE- MUSE: 91 BPM

## 2021-08-19 PROCEDURE — 96374 THER/PROPH/DIAG INJ IV PUSH: CPT

## 2021-08-19 PROCEDURE — 250N000012 HC RX MED GY IP 250 OP 636 PS 637: Performed by: EMERGENCY MEDICINE

## 2021-08-19 RX ADMIN — HUMAN INSULIN 7 UNITS: 100 INJECTION, SOLUTION SUBCUTANEOUS at 01:29

## 2021-08-19 ASSESSMENT — ENCOUNTER SYMPTOMS
HEADACHES: 0
JOINT SWELLING: 0
VOMITING: 0
RHINORRHEA: 0
DIFFICULTY URINATING: 0
NAUSEA: 0
WHEEZING: 0
HEMATURIA: 0
APPETITE CHANGE: 0
COUGH: 0
ACTIVITY CHANGE: 0
DIARRHEA: 0
ABDOMINAL PAIN: 0
FEVER: 0
SHORTNESS OF BREATH: 0
COLOR CHANGE: 0
FLANK PAIN: 0
ARTHRALGIAS: 0
FATIGUE: 0
MYALGIAS: 0
LIGHT-HEADEDNESS: 0

## 2021-08-19 NOTE — ED PROVIDER NOTES
EMERGENCY DEPARTMENT ENCOUNTER      NAME: Kulwant Amin  AGE: 51 year old female  YOB: 1970  MRN: [id number]  EVALUATION DATE & TIME: 8/18/2021 10:32 PM    PCP: Adrien Cardoza    ED PROVIDER: Van Barr D.O.      Chief Complaint   Patient presents with     Hyperglycemia         FINAL IMPRESSION:  1. Hyperglycemia          ED COURSE & MEDICAL DECISION MAKING:    Pertinent Labs & Imaging studies reviewed. (See chart for details)  51 year old female presents to the Emergency Department for evaluation of hyperglycemia.  Patient reportedly did not have insulin for over 1 year until this week, was only taking oral hypoglycemic medications, was seen by her primary televisit and started back on insulin.  Family notes that her glucose levels have been elevated throughout the week.  Patient is currently asymptomatic initial glucose greater than 600.  Blood work is fairly reassuring other than hyperglycemia, no evidence of DKA or acidosis.  Patient initially treated with 2 L of fluid, did have a downtrending of her glucose level, treated with IV insulin and had continued decrease of her glucose.  Do feel the patient is okay for discharge home with close follow-up with her primary physician in regards to dosing of her insulin, family notes agreement.    10:55 PM I met with the patient to gather history and to perform my initial exam. I discussed the plan for care while in the Emergency Department. PPE (protective eyewear, gloves, surgical cap, and N95 mask) was worn by me during patient encounters while patient wore mask.   12:03 AM I re-evaluated patient and updated her and family on plan for care.  1:24 AM Glucose recheck is 441.  1:33 AM Re-evaluated and updated patient.  Patient no distress.  2:12 AM Repeat glucose is 346. I re-evaluated patient and discussed plans for discharge. Family is agreeable with plan.  Recommend to the patient and patient's family that she call her primary physician later today for  recommendations for adjustment to her insulin.  No signs of DKA will discharge.    At the conclusion of the encounter I discussed the results of all of the tests and the disposition. The questions were answered. The patient or family acknowledged understanding and was agreeable with the care plan.       30 minutes of critical care time     MEDICATIONS GIVEN IN THE EMERGENCY:  Medications   0.9% sodium chloride BOLUS (0 mLs Intravenous Stopped 8/19/21 0113)   insulin (regular) (HumuLIN R/NovoLIN R) injection 7 Units (7 Units Intravenous Given 8/19/21 0129)       NEW PRESCRIPTIONS STARTED AT TODAY'S ER VISIT  New Prescriptions    No medications on file          =================================================================    HPI    Patient information was obtained from: Family member    Use of : N/A         Kulwant Amin is a 51 year old female with a pertinent history of DM II, COPD, asthma, CAD, HLD, HTN, anxiety, who presents to this ED by walk in for evaluation of hyperglycemia.    Per chart review, patient was seen in ED on 8/12/21 for hyperglycemia. It was noted that she has been off of insulin for the past year and is only taking pills. She was seen by PCP on 8/17/21 (2 days ago) and is now on Lanuts 10 units daily. Patient is back on a sliding scale insulin and is continuing 750 mg Metformin daily.     Patient has had elevated blood sugars for the past week. She states she has been taking her insulin and denies any new medications. She denies any associated symptoms, no chest pain, abdominal pain, urinary symptoms, fever, vomiting, diarrhea, or additional medical concerns or complaints at this time.        REVIEW OF SYSTEMS   Review of Systems   Constitutional: Negative for activity change, appetite change, fatigue and fever.   HENT: Negative for congestion and rhinorrhea.    Respiratory: Negative for cough, shortness of breath and wheezing.    Cardiovascular: Negative for chest pain and leg  swelling.   Gastrointestinal: Negative for abdominal pain, diarrhea, nausea and vomiting.   Genitourinary: Negative for difficulty urinating, flank pain, hematuria and urgency.   Musculoskeletal: Negative for arthralgias, joint swelling and myalgias.   Skin: Negative for color change and rash.   Neurological: Negative for syncope, light-headedness and headaches.   Psychiatric/Behavioral: Negative for self-injury and suicidal ideas.      PAST MEDICAL HISTORY:  Past Medical History:   Diagnosis Date     Acute asthma exacerbation 1/6/2020     Acute blood loss anemia      Anxiety      Arthritis      Asthma      Asthma exacerbation 11/19/2015     Chest wall pain 12/26/2017    Added automatically from request for surgery [id number]     COPD (chronic obstructive pulmonary disease) (H)      Coronary artery disease due to lipid rich plaque 1/25/2018     Depression      Diabetes mellitus, type II (H)      Essential hypertension 4/14/2017     Generalized headaches      GERD (gastroesophageal reflux disease)      History of anesthesia complications     nausea and vomiting     Lactic acid acidosis 11/23/2018     Lower GI bleeding      Nonspecific chest pain 12/07/2018    2 negative stress tests since coronary stenting in 2018; in June 2020 she had 1 week of this pain with normal cardiac enzymes and relief with Toradol     Pneumonia      SVT (supraventricular tachycardia) (H)      TB lung, latent     9 mos INH       PAST SURGICAL HISTORY:  Past Surgical History:   Procedure Laterality Date     CORONARY STENT PLACEMENT  2018     CV CORONARY ANGIOGRAM N/A 1/25/2018    Procedure: Coronary Angiogram;  Surgeon: Angelo Serrano MD;  Location: [facility];  Service:      UT ESOPHAGOGASTRODUODENOSCOPY TRANSORAL DIAGNOSTIC N/A 12/10/2018    Procedure: ESOPHAGOGASTRODUODENOSCOPY (EGD);  Surgeon: Eddie Renteria MD;  Location: [facility];  Service: Gastroenterology     UT ESOPHAGOGASTRODUODENOSCOPY TRANSORAL DIAGNOSTIC N/A  12/3/2020    Procedure: ESOPHAGOGASTRODUODENOSCOPY (EGD) with biospies ;  Surgeon: Avi Crow MD;  Location: [facility];  Service: Gastroenterology     [facility] COLONOSCOPY W/WO BRUSH/WASH N/A 12/10/2018    Procedure: COLONOSCOPY with polypectomy using biopsy forceps;  Surgeon: Eddie Renteria MD;  Location: [facility];  Service: Gastroenterology           CURRENT MEDICATIONS:    No current facility-administered medications for this encounter.     Current Outpatient Medications   Medication     acetaminophen (TYLENOL) 500 MG tablet     albuterol (PROAIR HFA, PROVENTIL HFA, VENTOLIN HFA) 108 (90 BASE) MCG/ACT inhaler     albuterol (PROVENTIL) (2.5 MG/3ML) 0.083% neb solution     amitriptyline (ELAVIL) 10 MG tablet     ASPIRIN LOW DOSE 81 MG EC tablet     atorvastatin (LIPITOR) 80 MG tablet     clopidogrel (PLAVIX) 75 MG tablet     colchicine (COLCYRS) 0.6 MG tablet     cyclobenzaprine (FLEXERIL) 5 MG tablet     Dupilumab (DUPIXENT) 200 MG/1.14ML SOSY     ferrous sulfate (FEROSUL) 325 (65 Fe) MG tablet     fluticasone-vilanterol (BREO ELLIPTA) 200-25 MCG/INH inhaler     gabapentin (NEURONTIN) 300 MG capsule     guaiFENesin (ROBITUSSIN) 100 MG/5ML liquid     guaiFENesin-codeine (ROBITUSSIN AC) 100-10 MG/5ML SOLN     hydrochlorothiazide (MICROZIDE) 12.5 MG capsule     insulin aspart (NOVOLOG FLEXPEN) 100 UNIT/ML pen     insulin aspart (NOVOLOG VIAL) 100 UNITS/ML vial     insulin glargine (LANTUS PEN) 100 UNIT/ML pen     insulin pen needle (32G X 4 MM) 32G X 4 MM miscellaneous     insulin pen needle (32G X 4 MM) 32G X 4 MM miscellaneous     ipratropium - albuterol 0.5 mg/2.5 mg/3 mL (DUONEB) 0.5-2.5 (3) MG/3ML neb solution     loratadine (CLARITIN) 10 MG tablet     meclizine (ANTIVERT) 25 MG tablet     metFORMIN (GLUCOPHAGE-XR) 750 MG 24 hr tablet     metoprolol tartrate (LOPRESSOR) 25 MG tablet     montelukast (SINGULAIR) 10 MG tablet     omeprazole (PRILOSEC) 40 MG DR capsule     polyethylene glycol (MIRALAX)  powder     polyvinyl alcohol (ARTIFICIAL TEARS) 1.4 % ophthalmic solution     predniSONE (DELTASONE) 2.5 MG tablet     sucralfate (CARAFATE) 1 GM tablet     tiotropium (SPIRIVA RESPIMAT) 2.5 MCG/ACT inhalation aerosol         ALLERGIES:  No Known Allergies    FAMILY HISTORY:  Family History   Problem Relation Age of Onset     Other - See Comments Mother          of an intestinal problem     Ulcers Father          of gastritis     Breast Cancer No family hx of        SOCIAL HISTORY:   Social History     Socioeconomic History     Marital status:      Spouse name: Not on file     Number of children: Not on file     Years of education: Not on file     Highest education level: Not on file   Occupational History     Not on file   Tobacco Use     Smoking status: Former Smoker     Packs/day: 1.00     Years: 30.00     Pack years: 30.00     Types: Cigarettes, Cigarettes, Cigarettes     Quit date: 2003     Years since quittin.6     Smokeless tobacco: Never Used     Tobacco comment: No passive exposure   Substance and Sexual Activity     Alcohol use: No     Drug use: No     Sexual activity: Yes     Partners: Male   Other Topics Concern     Parent/sibling w/ CABG, MI or angioplasty before 65F 55M? Not Asked   Social History Narrative    2017 The patient lives with her daughter-in-law (who is present), , son, and 2 grandchildren (total of 6 people). Immigrant.     Social Determinants of Health     Financial Resource Strain:      Difficulty of Paying Living Expenses:    Food Insecurity:      Worried About Running Out of Food in the Last Year:      Ran Out of Food in the Last Year:    Transportation Needs:      Lack of Transportation (Medical):      Lack of Transportation (Non-Medical):    Physical Activity:      Days of Exercise per Week:      Minutes of Exercise per Session:    Stress:      Feeling of Stress :    Social Connections:      Frequency of Communication with Friends and Family:       Frequency of Social Gatherings with Friends and Family:      Attends Ca[employer] Services:      Active Member of Clubs or Organizations:      Attends Club or Organization Meetings:      Marital Status:    Intimate Partner Violence:      Fear of Current or Ex-Partner:      Emotionally Abused:      Physically Abused:      Sexually Abused:        VITALS:  /74   Pulse 97   Temp 97  F (36.1  C) (Temporal)   Resp 18   Wt 55.8 kg (123 lb)   SpO2 98%   BMI 21.11 kg/m      PHYSICAL EXAM    Physical Exam  Vitals and nursing note reviewed.   Constitutional:       General: She is not in acute distress.     Appearance: Normal appearance. She is not ill-appearing.   HENT:      Head: Normocephalic and atraumatic.      Right Ear: External ear normal.      Left Ear: External ear normal.      Nose: Nose normal.      Mouth/Throat:      Mouth: Mucous membranes are moist.   Eyes:      Extraocular Movements: Extraocular movements intact.      Pupils: Pupils are equal, round, and reactive to light.   Cardiovascular:      Rate and Rhythm: Normal rate and regular rhythm.   Pulmonary:      Effort: Pulmonary effort is normal. No respiratory distress.      Breath sounds: Normal breath sounds. No stridor. No wheezing.   Abdominal:      General: There is no distension.      Palpations: Abdomen is soft. There is no mass.      Tenderness: There is no abdominal tenderness. There is no guarding or rebound.      Hernia: No hernia is present.   Musculoskeletal:         General: No swelling, tenderness or signs of injury. Normal range of motion.      Cervical back: Normal range of motion.   Skin:     General: Skin is warm and dry.      Capillary Refill: Capillary refill takes less than 2 seconds.   Neurological:      General: No focal deficit present.      Mental Status: She is alert and oriented to person, place, and time.      Cranial Nerves: No cranial nerve deficit.      Motor: No weakness.      Coordination: Coordination normal.       Gait: Gait normal.   Psychiatric:         Mood and Affect: Mood normal.          LAB:  All pertinent labs reviewed and interpreted.  Labs Ordered and Resulted from Time of ED Arrival Up to the Time of Departure from the ED   GLUCOSE BY METER - Abnormal; Notable for the following components:       Result Value    GLUCOSE BY METER POCT >600 (*)     All other components within normal limits   BASIC METABOLIC PANEL - Abnormal; Notable for the following components:    Sodium 129 (*)     Carbon Dioxide (CO2) 20 (*)     Glucose 674 (*)     All other components within normal limits   LACTIC ACID WHOLE BLOOD - Abnormal; Notable for the following components:    Lactic Acid 2.4 (*)     All other components within normal limits   BLOOD GAS VENOUS - Abnormal; Notable for the following components:    Oxyhemoglobin Venous 61.2 (*)     O2 Sat, Venous 62.4 (*)     All other components within normal limits   ROUTINE UA WITH MICROSCOPIC REFLEX TO CULTURE - Abnormal; Notable for the following components:    Glucose Urine >1000 (*)     All other components within normal limits    Narrative:     Urine Culture not indicated   GLUCOSE BY METER - Abnormal; Notable for the following components:    GLUCOSE BY METER POCT 441 (*)     All other components within normal limits   GLUCOSE BY METER - Abnormal; Notable for the following components:    GLUCOSE BY METER POCT 346 (*)     All other components within normal limits   LIPASE - Normal   TROPONIN I - Normal   MAGNESIUM - Normal   KETONE BETA-HYDROXYBUTYRATE QUANTITATIVE, RAPID - Normal   CBC WITH PLATELETS & DIFFERENTIAL    Narrative:     The following orders were created for panel order CBC with platelets differential.  Procedure                               Abnormality         Status                     ---------                               -----------         ------                     CBC with platelets and d...[[id number]]                      Final result                 Please view  results for these tests on the individual orders.   CBC WITH PLATELETS AND DIFFERENTIAL   GLUCOSE MONITOR NURSING POCT   GLUCOSE MONITOR NURSING POCT   PERIPHERAL IV CATHETER       RADIOLOGY:  Reviewed all pertinent imaging. Please see official radiology report.  No orders to display       EKG:    Performed at: 2234    Impression: Normal sinus rhythm    Rate: 91 bpm  Rhythm: Sinus  Axis: -20  WV Interval: 148 ms  QRS Interval: 90 ms  QTc Interval: 484 ms  ST Changes: None  Comparison: 8/12/21; no significant change.    I have independently reviewed and interpreted the EKG(s) documented above.      Critical Care     Performed by: Van BARR  Total critical care time: 30 minutes  Critical care was necessary to treat or prevent imminent or life-threatening deterioration of the following conditions: hyperglycemia  Critical care was time spent personally by me on the following activities: development of treatment plan with patient or surrogate, discussions with consultants, examination of patient, evaluation of patient's response to treatment, obtaining history from patient or surrogate, ordering and performing treatments and interventions, ordering and review of laboratory studies, ordering and review of radiographic studies, re-evaluation of patient's condition and monitoring for potential decompensation.  Critical care time was exclusive of separately billable procedures and treating other patients.      I, Robyn Zaidi, am serving as a scribe to document services personally performed by Dr. Van Barr based on my observation and the provider's statements to me. I, Van Barr, DO attest that Robyn Zaidi is acting in a scribe capacity, has observed my performance of the services and has documented them in accordance with my direction.    Van Barr D.O.  Emergency Medicine  [facility] EMERGENCY DEPARTMENT  [address]  [address]  [phone]  Dept: [phone]       Van Barr DO  08/19/21 0237

## 2021-08-20 ENCOUNTER — NURSE TRIAGE (OUTPATIENT)
Dept: NURSING | Facility: CLINIC | Age: 51
End: 2021-08-20

## 2021-08-20 NOTE — TELEPHONE ENCOUNTER
Pt needs appt (htn)  before any refills. Please have them call to schedule.  Thanks RN triage   Call from pt son Rhys- no consent in chart for Rhys-- added Kiswahili  --   Per pt -- fever -- 103 last night -- and 102 now   Having some chest pain and diff breathing - and cough = not severe --   Per protocol = should go to ED   Pt states she went to ED on Wednesday and does not want to go back to ED   Pt states she wants to come to clinic -- she wants chest x ray -- she thinks she has pneumonia --     Please advise :  When and where do you want pt seen ?    Rakel Armstrong RN  BAN  Triage Nurse Advisor    COVID 19 Nurse Triage Plan/Patient Instructions    Please be aware that novel coronavirus (COVID-19) may be circulating in the community. If you develop symptoms such as fever, cough, or SOB or if you have concerns about the presence of another infection including coronavirus (COVID-19), please contact your health care provider or visit [email or web address].     Disposition/Instructions    ED Visit recommended. Follow protocol based instructions.     Bring Your Own Device:  Please also bring your smart device(s) (smart phones, tablets, laptops) and their charging cables for your personal use and to communicate with your care team during your visit.    Thank you for taking steps to prevent the spread of this virus.  o Limit your contact with others.  o Wear a simple mask to cover your cough.  o Wash your hands well and often.    Resources    [facility]: About COVID-19: [email or web address]    CDC: What to Do If You're Sick: www.cdc.gov/coronavirus/2019-ncov/about/steps-when-sick.html    CDC: Ending Home Isolation: www.cdc.gov/coronavirus/2019-ncov/hcp/disposition-in-home-patients.html     CDC: Caring for Someone: www.cdc.gov/coronavirus/2019-ncov/if-you-are-sick/care-for-someone.html     [facility]: Interim Guidance for Hospital Discharge to Home: www.health.[facility].[place]/diseases/coronavirus/hcp/hospdischarge.pdf    [facility] clinical trials (COVID-19 research  studies): [email or web address]-clinical-trials     Below are the COVID-19 hotlines at the [place] Department of Health ([facility]). Interpreters are available.   o For health questions: Call 903-450-7538 or 1-722.343.1959 (7 a.m. to 7 p.m.)  o For questions about schools and childcare: Call 677-429-1759 or 1-746.412.2539 (7 a.m. to 7 p.m.)                     Reason for Disposition    MILD difficulty breathing (e.g., minimal/no SOB at rest, SOB with walking, pulse <100)    Chest pain or pressure    Additional Information    Negative: SEVERE difficulty breathing (e.g., struggling for each breath, speaks in single words)    Negative: Difficult to awaken or acting confused (e.g., disoriented, slurred speech)    Negative: Bluish (or gray) lips or face now    Negative: Shock suspected (e.g., cold/pale/clammy skin, too weak to stand, low BP, rapid pulse)    Negative: Sounds like a life-threatening emergency to the triager    Negative: SEVERE or constant chest pain or pressure (Exception: mild central chest pain, present only when coughing)    Negative: MODERATE difficulty breathing (e.g., speaks in phrases, SOB even at rest, pulse 100-120)    Negative: [1] Headache AND [2] stiff neck (can't touch chin to chest)    Protocols used: CORONAVIRUS (COVID-19) DIAGNOSED OR [id number] 3.25

## 2021-08-20 NOTE — TELEPHONE ENCOUNTER
Discussed with RN's at [facility]. Patient caller (Rhys) notified that it is not appropriate to visit clinic for COVID symptoms. Patient will be seen at [facility]/ED today.

## 2021-08-27 ENCOUNTER — APPOINTMENT (OUTPATIENT)
Dept: CT IMAGING | Facility: HOSPITAL | Age: 51
End: 2021-08-27
Attending: EMERGENCY MEDICINE
Payer: COMMERCIAL

## 2021-08-27 ENCOUNTER — APPOINTMENT (OUTPATIENT)
Dept: RADIOLOGY | Facility: HOSPITAL | Age: 51
End: 2021-08-27
Attending: EMERGENCY MEDICINE
Payer: COMMERCIAL

## 2021-08-27 ENCOUNTER — HOSPITAL ENCOUNTER (EMERGENCY)
Facility: HOSPITAL | Age: 51
Discharge: HOME | End: 2021-08-27
Attending: EMERGENCY MEDICINE | Admitting: EMERGENCY MEDICINE
Payer: COMMERCIAL

## 2021-08-27 VITALS
TEMPERATURE: 98.7 F | WEIGHT: 123 LBS | SYSTOLIC BLOOD PRESSURE: 127 MMHG | BODY MASS INDEX: 21.11 KG/M2 | RESPIRATION RATE: 25 BRPM | DIASTOLIC BLOOD PRESSURE: 73 MMHG | HEART RATE: 73 BPM | OXYGEN SATURATION: 100 %

## 2021-08-27 DIAGNOSIS — R42 DIZZINESS: ICD-10-CM

## 2021-08-27 DIAGNOSIS — R07.9 CHEST PAIN, UNSPECIFIED TYPE: ICD-10-CM

## 2021-08-27 LAB
ANION GAP SERPL CALCULATED.3IONS-SCNC: 13 MMOL/L (ref 5–18)
BASOPHILS # BLD AUTO: 0 10E3/UL (ref 0–0.2)
BASOPHILS NFR BLD AUTO: 0 %
BNP SERPL-MCNC: 11 PG/ML (ref 0–78)
BUN SERPL-MCNC: 19 MG/DL (ref 8–22)
CALCIUM SERPL-MCNC: 9.6 MG/DL (ref 8.5–10.5)
CHLORIDE BLD-SCNC: 106 MMOL/L (ref 98–107)
CO2 SERPL-SCNC: 21 MMOL/L (ref 22–31)
CREAT SERPL-MCNC: 0.82 MG/DL (ref 0.6–1.1)
EOSINOPHIL # BLD AUTO: 0 10E3/UL (ref 0–0.7)
EOSINOPHIL NFR BLD AUTO: 0 %
ERYTHROCYTE [DISTWIDTH] IN BLOOD BY AUTOMATED COUNT: 15.1 % (ref 10–15)
GFR SERPL CREATININE-BSD FRML MDRD: 82 ML/MIN/1.73M2
GLUCOSE BLD-MCNC: 188 MG/DL (ref 70–125)
HCT VFR BLD AUTO: 41 % (ref 35–47)
HGB BLD-MCNC: 12.7 G/DL (ref 11.7–15.7)
IMM GRANULOCYTES # BLD: 0 10E3/UL
IMM GRANULOCYTES NFR BLD: 0 %
LYMPHOCYTES # BLD AUTO: 2.3 10E3/UL (ref 0.8–5.3)
LYMPHOCYTES NFR BLD AUTO: 32 %
MCH RBC QN AUTO: 26.2 PG (ref 26.5–33)
MCHC RBC AUTO-ENTMCNC: 31 G/DL (ref 31.5–36.5)
MCV RBC AUTO: 85 FL (ref 78–100)
MONOCYTES # BLD AUTO: 0.5 10E3/UL (ref 0–1.3)
MONOCYTES NFR BLD AUTO: 7 %
NEUTROPHILS # BLD AUTO: 4.5 10E3/UL (ref 1.6–8.3)
NEUTROPHILS NFR BLD AUTO: 61 %
NRBC # BLD AUTO: 0 10E3/UL
NRBC BLD AUTO-RTO: 0 /100
PLATELET # BLD AUTO: 250 10E3/UL (ref 150–450)
POTASSIUM BLD-SCNC: 3.9 MMOL/L (ref 3.5–5)
RBC # BLD AUTO: 4.84 10E6/UL (ref 3.8–5.2)
SODIUM SERPL-SCNC: 140 MMOL/L (ref 136–145)
TROPONIN I SERPL-MCNC: <0.01 NG/ML (ref 0–0.29)
WBC # BLD AUTO: 7.4 10E3/UL (ref 4–11)

## 2021-08-27 PROCEDURE — 80048 BASIC METABOLIC PNL TOTAL CA: CPT | Performed by: STUDENT IN AN ORGANIZED HEALTH CARE EDUCATION/TRAINING PROGRAM

## 2021-08-27 PROCEDURE — 85025 COMPLETE CBC W/AUTO DIFF WBC: CPT | Performed by: STUDENT IN AN ORGANIZED HEALTH CARE EDUCATION/TRAINING PROGRAM

## 2021-08-27 PROCEDURE — 250N000009 HC RX 250: Performed by: EMERGENCY MEDICINE

## 2021-08-27 PROCEDURE — 93005 ELECTROCARDIOGRAM TRACING: CPT | Performed by: STUDENT IN AN ORGANIZED HEALTH CARE EDUCATION/TRAINING PROGRAM

## 2021-08-27 PROCEDURE — 36415 COLL VENOUS BLD VENIPUNCTURE: CPT | Performed by: STUDENT IN AN ORGANIZED HEALTH CARE EDUCATION/TRAINING PROGRAM

## 2021-08-27 PROCEDURE — 93005 ELECTROCARDIOGRAM TRACING: CPT

## 2021-08-27 PROCEDURE — 84484 ASSAY OF TROPONIN QUANT: CPT | Performed by: STUDENT IN AN ORGANIZED HEALTH CARE EDUCATION/TRAINING PROGRAM

## 2021-08-27 PROCEDURE — 83880 ASSAY OF NATRIURETIC PEPTIDE: CPT | Performed by: EMERGENCY MEDICINE

## 2021-08-27 PROCEDURE — 93005 ELECTROCARDIOGRAM TRACING: CPT | Performed by: EMERGENCY MEDICINE

## 2021-08-27 PROCEDURE — 84484 ASSAY OF TROPONIN QUANT: CPT | Performed by: EMERGENCY MEDICINE

## 2021-08-27 PROCEDURE — 258N000003 HC RX IP 258 OP 636: Performed by: EMERGENCY MEDICINE

## 2021-08-27 PROCEDURE — 71045 X-RAY EXAM CHEST 1 VIEW: CPT

## 2021-08-27 PROCEDURE — 85025 COMPLETE CBC W/AUTO DIFF WBC: CPT | Performed by: EMERGENCY MEDICINE

## 2021-08-27 PROCEDURE — 96360 HYDRATION IV INFUSION INIT: CPT

## 2021-08-27 PROCEDURE — 99285 EMERGENCY DEPT VISIT HI MDM: CPT | Mod: 25

## 2021-08-27 PROCEDURE — 70450 CT HEAD/BRAIN W/O DYE: CPT

## 2021-08-27 PROCEDURE — 80048 BASIC METABOLIC PNL TOTAL CA: CPT | Performed by: EMERGENCY MEDICINE

## 2021-08-27 PROCEDURE — 96361 HYDRATE IV INFUSION ADD-ON: CPT

## 2021-08-27 RX ORDER — IPRATROPIUM BROMIDE AND ALBUTEROL SULFATE 2.5; .5 MG/3ML; MG/3ML
3 SOLUTION RESPIRATORY (INHALATION) ONCE
Status: COMPLETED | OUTPATIENT
Start: 2021-08-27 | End: 2021-08-27

## 2021-08-27 RX ADMIN — IPRATROPIUM BROMIDE AND ALBUTEROL SULFATE 3 ML: .5; 3 SOLUTION RESPIRATORY (INHALATION) at 15:25

## 2021-08-27 RX ADMIN — SODIUM CHLORIDE 1000 ML: 9 INJECTION, SOLUTION INTRAVENOUS at 14:26

## 2021-08-27 NOTE — ED TRIAGE NOTES
Pt comes in with family. Has had some dizziness for last three to four days. Has had this in the past. Pt also complains of CP that started yesterday in her mid chest. Pt is also a diabetic Pt last sugar was around 104

## 2021-08-27 NOTE — ED PROVIDER NOTES
EMERGENCY DEPARTMENT ENCOUNTER      NAME: Kulwant Amin  AGE: 51 year old female  YOB: 1970  MRN: [id number]  EVALUATION DATE & TIME: 8/27/2021  2:01 PM    PCP: Adrien Cardoza    ED PROVIDER: Tish Ballard M.D.      Chief Complaint   Patient presents with     Dizziness     Chest Pain     FINAL IMPRESSION:  1. Dizziness    2. Chest pain, unspecified type      ED COURSE & MEDICAL DECISION MAKING:    Pertinent Labs & Imaging studies reviewed. (See chart for details)  2:02 PM I met with the patient to gather history and to perform my initial exam. We discussed plans for the ED course, including diagnostic testing and treatment. PPE worn: n95 mask, gloves.  3:48 PM I rechecked and updated patient and daughter with results.    ED Course as of Aug 27 2102   Fri Aug 27, 2021   1415 Patient is a 51-year-old female who comes in today with 2 main complaints.  She is been having some dizziness has been going on and off for about 8 months since she had a stroke.  It does not sound like anything is new.  She has some residual left-sided weakness from her previous stroke.  She follows with neurology.  It sounds like it gets worse if she is dehydrated and if she is up and moving around but nothing they are telling me sounds like this is a brand-new symptom.  Her other symptom today is some chest discomfort.  She been having it for about 3 days.  Is been constant.  It sometimes gets worse with exertion.  This also goes on anywhere from every week to every month.  She is had stents placed in the past.  She called cardiology today and they recommended she come back in.  She had blood work drawn from triage and her troponin came back negative.  Since symptoms have been going on constantly for 3 days I do not inks that a repeat troponin is necessary.  Were not seeing signs of acute MI.  Her EKG showed some sinus tachycardia which would support some dehydration but did not show any acute ischemic changes.  She has a history  of COPD and asthma.  We will get a chest x-ray on her make sure there is no signs of pneumonia.  She has little cough but it is her normal cough.  She is had her first Covid vaccine and due for the second 1 shortly.  She has not had any fevers.  None of her symptoms sound particularly acute today.  It sounds like when she called the cardiologist they wanted her to get seen which is why she came in today.  She is hemodynamically stable.  She does not look in any distress.  She is not vomiting or diaphoretic and has no new neurologic symptoms.  We will plan to give her some fluids and see if it helps with the dizziness and make sure she is hydrated.        1418 We will get a CT scan because family is little concerned that she may have had another stroke.  There is nothing there telling me that sounds like she has had new onset symptoms but they were worried about it.  I think a reasonable compromise is a CT scan since we should see a stroke of its a few days old on CT scan.  My suspicion for stroke is not very high.  If that looks okay I think she can go home and follow-up as an outpatient with both her cardiologist and her neurologist.  She is in agreement with the plan.        1503 #Ischemic apnea the patient's been having a little bit of wheezing so I did order her a neb since she has a known history of COPD.      1518 X-ray showed a little pulmonary vascular congestion so I added on a BNP and will see what that looks like.      1550 Patient is feeling better after the fluids.  BNP came back at 11.  She looks well now.  We can send her home and have her follow-up with neurology and then either cardiology or her primary care physician.  She is in agreement with the plan.          At the conclusion of the encounter I discussed  the results of all of the tests and the disposition with patient.   All questions were answered.  The patient acknowledged understanding and was involved in the decision making regarding the  overall care plan.      I discussed with patient the utility, limitations and findings of the exam/interventions/studies done during this visit as well as the list of differential diagnosis and symptoms to monitor/return to ER for.  Additional verbal discharge instructions were provided.     MEDICATIONS GIVEN IN THE EMERGENCY:  Medications   0.9% sodium chloride BOLUS (0 mLs Intravenous Stopped 8/27/21 1602)   ipratropium - albuterol 0.5 mg/2.5 mg/3 mL (DUONEB) neb solution 3 mL (3 mLs Nebulization Given 8/27/21 7665)       NEW PRESCRIPTIONS STARTED AT TODAY'S ER VISIT  Discharge Medication List as of 8/27/2021  4:02 PM             =================================================================    HPI    Triage Note: Pt comes in with family. Has had some dizziness for last three to four days. Has had this in the past. Pt also complains of CP that started yesterday in her mid chest. Pt is also a diabetic Pt last sugar was around 104      Patient information was obtained from: patient and daughter    Use of : Yes (In Person, family) - Language: Icelandic        Kulwant Amin is a 51 year old female with a pertinent past medical history of CVA, HTN, HLD, CAD s/p stent placement (2018), DM2, COPD, and asthma, who presents via walk-in with family for evaluation of chest pain and dizziness.    Per chart review, the patient has had 2 ED visits in the past 2 weeks for hyperglycemia. The most recent visit was 9 days ago (8/18). The patient had reportedly been off insulin for the past year and was only taking oral hypoglycemic medications. She had a recent PCP visit and was started on Lantus 10 units daily, sliding scale insulin, and continuing 750 mg Metformin daily. Blood work was fairly reassuring other than hyperglycemia. No evidence of DKA or acidosis. Treated with IV fluids. Glucose was downtrending. Discharged with plan for PCP follow up.     Daughter reports patient developed chest pain and dizziness 3 days  ago (8/24). States her symptoms have been constant for the past 3 days but daughter does note that she intermittently experiences these symptoms about every month or few weeks since her ischemic stroke this past January. Her chest pain is the patient's primary concern. States it is across her mid chest. Notes it feels similar to prior chest pain when she underwent coronary angiogram with stent placement. She still follows with cardiology and consulted them this morning, who recommended she go to the ED.     Patient states her dizziness has also been constant since it started 3 days ago. Daughter notes her dizziness usually happens when she is dehydrated. States her dizziness is worse with walking. She has residual left sided weakness. No acute weakness.     Reports baseline cough which she attributes to her COPD. Patient denies nausea, vomiting, and fever. Denies history of MI or CHF.       REVIEW OF SYSTEMS   Except as stated in the HPI all other systems reviewed and are negative.    PAST MEDICAL HISTORY:  Past Medical History:   Diagnosis Date     Acute asthma exacerbation 1/6/2020     Acute blood loss anemia      Anxiety      Arthritis      Asthma      Asthma exacerbation 11/19/2015     Chest wall pain 12/26/2017    Added automatically from request for surgery [id number]     COPD (chronic obstructive pulmonary disease) (H)      Coronary artery disease due to lipid rich plaque 1/25/2018     Depression      Diabetes mellitus, type II (H)      Essential hypertension 4/14/2017     Generalized headaches      GERD (gastroesophageal reflux disease)      History of anesthesia complications     nausea and vomiting     Lactic acid acidosis 11/23/2018     Lower GI bleeding      Nonspecific chest pain 12/07/2018    2 negative stress tests since coronary stenting in 2018; in June 2020 she had 1 week of this pain with normal cardiac enzymes and relief with Toradol     Pneumonia      SVT (supraventricular tachycardia) (H)      TB  lung, latent     9 mos INH       PAST SURGICAL HISTORY:  Past Surgical History:   Procedure Laterality Date     CORONARY STENT PLACEMENT  2018     CV CORONARY ANGIOGRAM N/A 1/25/2018    Procedure: Coronary Angiogram;  Surgeon: Angelo Serrano MD;  Location: [facility];  Service:      PA ESOPHAGOGASTRODUODENOSCOPY TRANSORAL DIAGNOSTIC N/A 12/10/2018    Procedure: ESOPHAGOGASTRODUODENOSCOPY (EGD);  Surgeon: Eddie Renteria MD;  Location: [facility];  Service: Gastroenterology     PA ESOPHAGOGASTRODUODENOSCOPY TRANSORAL DIAGNOSTIC N/A 12/3/2020    Procedure: ESOPHAGOGASTRODUODENOSCOPY (EGD) with biospies ;  Surgeon: Avi Crow MD;  Location: [facility];  Service: Gastroenterology     [facility] COLONOSCOPY W/WO BRUSH/WASH N/A 12/10/2018    Procedure: COLONOSCOPY with polypectomy using biopsy forceps;  Surgeon: Eddie Renteria MD;  Location: [facility];  Service: Gastroenterology       CURRENT MEDICATIONS:    No current facility-administered medications for this encounter.    Current Outpatient Medications:      acetaminophen (TYLENOL) 500 MG tablet, Take 2 tablets (1,000 mg) by mouth every 8 hours, Disp: 100 tablet, Rfl: 0     albuterol (PROAIR HFA, PROVENTIL HFA, VENTOLIN HFA) 108 (90 BASE) MCG/ACT inhaler, Inhale 2 puffs into the lungs every 4 hours as needed for shortness of breath / dyspnea, Disp: 1 Inhaler, Rfl: 4     albuterol (PROVENTIL) (2.5 MG/3ML) 0.083% neb solution, Inhale 2.5 mg into the lungs, Disp: , Rfl:      amitriptyline (ELAVIL) 10 MG tablet, 2 tablets At Bedtime, Disp: , Rfl:      ASPIRIN LOW DOSE 81 MG EC tablet, , Disp: , Rfl:      atorvastatin (LIPITOR) 80 MG tablet, TAKE 1 TABLET (80 MG TOTAL) BY MOUTH AT BEDTIME FOR CHOLESTEROL, Disp: , Rfl:      clopidogrel (PLAVIX) 75 MG tablet, Take 1 tablet (75 mg) by mouth daily, Disp: 90 tablet, Rfl: 3     colchicine (COLCYRS) 0.6 MG tablet, TAKE 1 TABLET (0.6 MG TOTAL) BY MOUTH DAILY, Disp: , Rfl:      cyclobenzaprine (FLEXERIL)  5 MG tablet, Take 1-2 tablets (5-10 mg) by mouth 3 times daily as needed for muscle spasms, Disp: 30 tablet, Rfl: 3     Dupilumab (DUPIXENT) 200 MG/1.14ML SOSY, Inject 200 mg Subcutaneous every 14 days, Disp: 1.14 mL, Rfl: 6     ferrous sulfate (FEROSUL) 325 (65 Fe) MG tablet, Take 325 mg by mouth, Disp: , Rfl:      fluticasone-vilanterol (BREO ELLIPTA) 200-25 MCG/INH inhaler, Inhale 1 puff into the lungs, Disp: , Rfl:      gabapentin (NEURONTIN) 300 MG capsule, Take 2 capsules (600 mg) by mouth 3 times daily, Disp: 180 capsule, Rfl: 3     guaiFENesin (ROBITUSSIN) 100 MG/5ML liquid, Take 10 mLs (200 mg) by mouth every 4 hours as needed for cough, Disp: 200 mL, Rfl: 1     guaiFENesin-codeine (ROBITUSSIN AC) 100-10 MG/5ML SOLN, Take 10 mLs by mouth every 6 hours as needed for cough, Disp: 120 mL, Rfl: 0     hydrochlorothiazide (MICROZIDE) 12.5 MG capsule, Take 1 capsule (12.5 mg) by mouth daily, Disp: 30 capsule, Rfl: 6     insulin aspart (NOVOLOG FLEXPEN) 100 UNIT/ML pen, Give before meals : For Pre-Meal Glucose: 140-189 give 1 unit, 190-239 give 2 units, 240-289 give 3 units, 290-339 give 4 units, = or >340 give 5 units., Disp: 15 mL, Rfl: 0     insulin aspart (NOVOLOG VIAL) 100 UNITS/ML vial, , Disp: , Rfl:      insulin glargine (LANTUS PEN) 100 UNIT/ML pen, Inject 10 Units Subcutaneous At Bedtime, Disp: 15 mL, Rfl: 3     insulin pen needle (32G X 4 MM) 32G X 4 MM miscellaneous, Use one pen needles daily or as directed., Disp: 200 each, Rfl: 11     insulin pen needle (32G X 4 MM) 32G X 4 MM miscellaneous, Use one pen needle daily as needed for SSI, Disp: 90 each, Rfl: 0     ipratropium - albuterol 0.5 mg/2.5 mg/3 mL (DUONEB) 0.5-2.5 (3) MG/3ML neb solution, Take 1 vial (3 mLs) by nebulization every 6 hours as needed for shortness of breath / dyspnea or wheezing, Disp: 240 mL, Rfl: 11     loratadine (CLARITIN) 10 MG tablet, Take 10 mg by mouth daily, Disp: , Rfl:      meclizine (ANTIVERT) 25 MG tablet, Take 25 mg by  mouth daily as needed , Disp: , Rfl:      metFORMIN (GLUCOPHAGE-XR) 750 MG 24 hr tablet, Take 750 mg by mouth daily (with breakfast), Disp: , Rfl:      metoprolol tartrate (LOPRESSOR) 25 MG tablet, TAKE 1 TABLET (25 MG TOTAL) BY MOUTH 2 (TWO) TIMES A DAY FOR BLOOD PRESSURE, Disp: , Rfl:      montelukast (SINGULAIR) 10 MG tablet, Take 1 tablet (10 mg) by mouth At Bedtime, Disp: 30 tablet, Rfl: 6     omeprazole (PRILOSEC) 40 MG DR capsule, 1 capsule daily, Disp: , Rfl:      polyethylene glycol (MIRALAX) powder, Take 17 g by mouth daily, Disp: 510 g, Rfl: 1     polyvinyl alcohol (ARTIFICIAL TEARS) 1.4 % ophthalmic solution, Place 1 drop into both eyes as needed for dry eyes, Disp: 15 mL, Rfl: 3     predniSONE (DELTASONE) 2.5 MG tablet, Take 2 tablets (5 mg) by mouth daily, Disp: 60 tablet, Rfl: 11     sucralfate (CARAFATE) 1 GM tablet, Take 1 g by mouth, Disp: , Rfl:      tiotropium (SPIRIVA RESPIMAT) 2.5 MCG/ACT inhalation aerosol, Inhale 2 puffs into the lungs daily, Disp: 4 g, Rfl: 1    ALLERGIES:  No Known Allergies    FAMILY HISTORY:  Family History   Problem Relation Age of Onset     Other - See Comments Mother          of an intestinal problem     Ulcers Father          of gastritis     Breast Cancer No family hx of        SOCIAL HISTORY:   Social History     Socioeconomic History     Marital status:      Spouse name: Not on file     Number of children: Not on file     Years of education: Not on file     Highest education level: Not on file   Occupational History     Not on file   Tobacco Use     Smoking status: Former Smoker     Packs/day: 1.00     Years: 30.00     Pack years: 30.00     Types: Cigarettes, Cigarettes, Cigarettes     Quit date: 2003     Years since quittin.6     Smokeless tobacco: Never Used     Tobacco comment: No passive exposure   Substance and Sexual Activity     Alcohol use: No     Drug use: No     Sexual activity: Yes     Partners: Male   Other Topics Concern      Parent/sibling w/ CABG, MI or angioplasty before 65F 55M? Not Asked   Social History Narrative    12/22/2017 The patient lives with her daughter-in-law (who is present), , son, and 2 grandchildren (total of 6 people). [place].     Social Determinants of Health     Financial Resource Strain:      Difficulty of Paying Living Expenses:    Food Insecurity:      Worried About Running Out of Food in the Last Year:      Ran Out of Food in the Last Year:    Transportation Needs:      Lack of Transportation (Medical):      Lack of Transportation (Non-Medical):    Physical Activity:      Days of Exercise per Week:      Minutes of Exercise per Session:    Stress:      Feeling of Stress :    Social Connections:      Frequency of Communication with Friends and Family:      Frequency of Social Gatherings with Friends and Family:      Attends Judaism Services:      Active Member of Clubs or Organizations:      Attends Club or Organization Meetings:      Marital Status:    Intimate Partner Violence:      Fear of Current or Ex-Partner:      Emotionally Abused:      Physically Abused:      Sexually Abused:        PHYSICAL EXAM    VITAL SIGNS: /73   Pulse 73   Temp 98.7  F (37.1  C)   Resp 25   Wt 55.8 kg (123 lb)   SpO2 100%   BMI 21.11 kg/m     GENERAL: Awake, Alert, answering questions, No acute distress, Well nourished  HEENT: Normal cephalic, Atraumatic, bilateral external ears normal, No scleral icterus, mask in place  NECK: No obvious swelling or abnormality, No stridor  PULMONARY:Normal and symmetric breath sounds, No respiratory distress, Lungs clear to auscultation bilaterally. No wheezing  CARDIOVASCULAR: Regular rate and rhythm, Distal pulses present and normal.  ABDOMINAL: Soft, Nondistended, Nontender, No flank tenderness, No palpable masses  BACK: No bruising or tenderness.  EXTREMITIES: Moves all extremities spontaneously, warm, no edema, No major deformities  NEURO: No facial droop, normal motor  function, Normal speech   PSYCH: Normal mood and affect  SKIN: No rashes on visualized skin, dry, warm     LAB:  All pertinent labs reviewed and interpreted.  Results for orders placed or performed during the hospital encounter of 08/27/21   XR Chest 1 View    Impression    IMPRESSION: Heart and mediastinal size are normal. There is mild pulmonary vascular congestion. No lobar infiltrate or pleural effusion. There is a benign calcified granuloma within the right middle lobe, stable. No pneumothorax.   Head CT w/o contrast    Impression    IMPRESSION:  1.  No acute intracranial process.  2.  Mild inflammatory changes of the paranasal sinuses including a small partially visualized left maxillary sinus air-fluid level. Correlate with any clinical evidence for acute sinusitis.   Basic metabolic panel   Result Value Ref Range    Sodium 140 136 - 145 mmol/L    Potassium 3.9 3.5 - 5.0 mmol/L    Chloride 106 98 - 107 mmol/L    Carbon Dioxide (CO2) 21 (L) 22 - 31 mmol/L    Anion Gap 13 5 - 18 mmol/L    Urea Nitrogen 19 8 - 22 mg/dL    Creatinine 0.82 0.60 - 1.10 mg/dL    Calcium 9.6 8.5 - 10.5 mg/dL    Glucose 188 (H) 70 - 125 mg/dL    GFR Estimate 82 >60 mL/min/1.73m2   Result Value Ref Range    Troponin I <0.01 0.00 - 0.29 ng/mL   CBC with platelets and differential   Result Value Ref Range    WBC Count 7.4 4.0 - 11.0 10e3/uL    RBC Count 4.84 3.80 - 5.20 10e6/uL    Hemoglobin 12.7 11.7 - 15.7 g/dL    Hematocrit 41.0 35.0 - 47.0 %    MCV 85 78 - 100 fL    MCH 26.2 (L) 26.5 - 33.0 pg    MCHC 31.0 (L) 31.5 - 36.5 g/dL    RDW 15.1 (H) 10.0 - 15.0 %    Platelet Count 250 150 - 450 10e3/uL    % Neutrophils 61 %    % Lymphocytes 32 %    % Monocytes 7 %    % Eosinophils 0 %    % Basophils 0 %    % Immature Granulocytes 0 %    NRBCs per 100 WBC 0 <1 /100    Absolute Neutrophils 4.5 1.6 - 8.3 10e3/uL    Absolute Lymphocytes 2.3 0.8 - 5.3 10e3/uL    Absolute Monocytes 0.5 0.0 - 1.3 10e3/uL    Absolute Eosinophils 0.0 0.0 - 0.7  10e3/uL    Absolute Basophils 0.0 0.0 - 0.2 10e3/uL    Absolute Immature Granulocytes 0.0 <=0.0 10e3/uL    Absolute NRBCs 0.0 10e3/uL   B-Type Natriuretic Peptide ([facility] Only)   Result Value Ref Range    BNP 11 0 - 78 pg/mL       RADIOLOGY:      XR Chest 1 View   Final Result   IMPRESSION: Heart and mediastinal size are normal. There is mild pulmonary vascular congestion. No lobar infiltrate or pleural effusion. There is a benign calcified granuloma within the right middle lobe, stable. No pneumothorax.      Head CT w/o contrast   Final Result   IMPRESSION:   1.  No acute intracranial process.   2.  Mild inflammatory changes of the paranasal sinuses including a small partially visualized left maxillary sinus air-fluid level. Correlate with any clinical evidence for acute sinusitis.          EKG:    Date and time: August 27, 2021 at 1249  Rate: 102 bpm  Rhythm: Sinus tachycardia  WI interval: 138 ms  QRS interval: 88 ms  QT/QTc: 378/492 ms  ST changes or T wave changes: No acute ST or T wave abnormalities  Change from prior ECG: No significant change from prior  I have independently reviewed and interpreted this EKG.     I, Wicho Ritchie, am serving as a scribe to document services personally performed by Dr. Ballard based on my observation and the provider's statements to me. I, Tish Ballard MD attest that Wicho Ritchie is acting in a scribe capacity, has observed my performance of the services and has documented them in accordance with my direction.    Tish Ballard M.D.  Emergency Medicine  [facility] EMERGENCY DEPARTMENT  [facility] [facility] [id number] [phone]  Dept: [phone]     Tish Ballard MD  08/27/21 [id number]

## 2021-08-27 NOTE — DISCHARGE INSTRUCTIONS
You were seen in the Emergency Department today for evaluation of dizziness and chest pain.  Your lab work showed no cause of your symptoms. Your imaging studies showed no significant cause of your symptoms.  Follow up with your neurologist and then either your cardiologist or primary care physician to ensure resolution of symptoms. Return if you have new or worsening symptoms.

## 2021-08-31 LAB
ATRIAL RATE - MUSE: 102 BPM
DIASTOLIC BLOOD PRESSURE - MUSE: NORMAL MMHG
INTERPRETATION ECG - MUSE: NORMAL
P AXIS - MUSE: 45 DEGREES
PR INTERVAL - MUSE: 138 MS
QRS DURATION - MUSE: 88 MS
QT - MUSE: 378 MS
QTC - MUSE: 492 MS
R AXIS - MUSE: -25 DEGREES
SYSTOLIC BLOOD PRESSURE - MUSE: NORMAL MMHG
T AXIS - MUSE: 43 DEGREES
VENTRICULAR RATE- MUSE: 102 BPM

## 2021-09-01 ENCOUNTER — HOSPITAL ENCOUNTER (OUTPATIENT)
Dept: PHYSICAL THERAPY | Facility: REHABILITATION | Age: 51
End: 2021-09-01
Attending: NURSE PRACTITIONER
Payer: COMMERCIAL

## 2021-09-01 DIAGNOSIS — R06.02 SHORTNESS OF BREATH: ICD-10-CM

## 2021-09-01 DIAGNOSIS — R06.2 WHEEZING: Primary | ICD-10-CM

## 2021-09-01 DIAGNOSIS — M54.41 CHRONIC BILATERAL LOW BACK PAIN WITH BILATERAL SCIATICA: ICD-10-CM

## 2021-09-01 DIAGNOSIS — M51.369 BULGING LUMBAR DISC: ICD-10-CM

## 2021-09-01 DIAGNOSIS — M48.061 LUMBAR FORAMINAL STENOSIS: ICD-10-CM

## 2021-09-01 DIAGNOSIS — M79.18 MYOFASCIAL PAIN: Primary | ICD-10-CM

## 2021-09-01 DIAGNOSIS — G89.29 CHRONIC BILATERAL LOW BACK PAIN WITH BILATERAL SCIATICA: ICD-10-CM

## 2021-09-01 DIAGNOSIS — R26.9 GAIT DIFFICULTY: ICD-10-CM

## 2021-09-01 DIAGNOSIS — M54.42 CHRONIC BILATERAL LOW BACK PAIN WITH BILATERAL SCIATICA: ICD-10-CM

## 2021-09-01 PROCEDURE — 97110 THERAPEUTIC EXERCISES: CPT | Mod: GP | Performed by: PHYSICAL THERAPIST

## 2021-09-01 PROCEDURE — 97140 MANUAL THERAPY 1/> REGIONS: CPT | Mod: GP | Performed by: PHYSICAL THERAPIST

## 2021-09-01 PROCEDURE — 97161 PT EVAL LOW COMPLEX 20 MIN: CPT | Mod: GP | Performed by: PHYSICAL THERAPIST

## 2021-09-01 RX ORDER — FUROSEMIDE 20 MG
20 TABLET ORAL DAILY
Qty: 3 TABLET | Refills: 0 | Status: SHIPPED | OUTPATIENT
Start: 2021-09-01 | End: 2021-11-30

## 2021-09-01 NOTE — PROGRESS NOTES
Received a call from Kulwant's daughter in law Billy to discuss patient's situation. She reports increased shortness of breath, wheezing and cough over the last week. No fevers. Cough is dry. No exposure to COVID that she is aware of. She is taking all three inhalers and prednisone as prescribed. Dr. Duong in clinic today and discussed with her, showed note and CXR from recent (8/27) ED visit. Dr. Duong ordered 3 days of 20mg lasix for patient to start today. Instructions provided to daughter in law and instructed her per Dr. Duong to call back on Friday to let RN know how her breathing is.     [facility]  # [id number].

## 2021-09-01 NOTE — PROGRESS NOTES
[facility]          OUTPATIENT PHYSICAL THERAPY ORTHOPEDIC EVALUATION  PLAN OF TREATMENT FOR OUTPATIENT REHABILITATION  (COMPLETE FOR INITIAL CLAIMS ONLY)  Patient's Last Name, First Name, M.I.  YOB: 1970  Kulwant Amin    Provider s Name:  [facility]   Medical Record No.  [id number]   Start of Care Date:  09/01/21   Onset Date:      Type:     _X__PT   ___OT   ___SLP Medical Diagnosis:  chronic bilateral low back pain with bilateral sciatica     PT Diagnosis:  bilateral chronic low back pain   Visits from SOC:  1      _________________________________________________________________________________  Plan of Treatment/Functional Goals:  gait training, manual therapy, ROM, strengthening, stretching           Goals  Goal Identifier: low back pain  Goal Description: patient will have decreased of low back pain to 4/10 to allow safe independent ambulation  Target Date: 10/01/21    Goal Identifier: unsteady. antalgic gait  Goal Description: patient will improve gait jaxon and posture  Target Date: 10/01/21                                                                      Therapy Frequency:  1 time/week  Predicted Duration of Therapy Intervention:  5 weeks    Graciela Alba PT                 I CERTIFY THE NEED FOR THESE SERVICES FURNISHED UNDER        THIS PLAN OF TREATMENT AND WHILE UNDER MY CARE     (Physician co-signature of this document indicates review and certification of the therapy plan).                       Certification Date From:  09/01/21   Certification Date To:  10/01/21    Referring Provider:  Daily Goldberg    Initial Assessment        See Epic Evaluation Start of Care Date: 09/01/21 09/01/21 1000   General Information   Type of Visit Initial OP Ortho PT Evaluation   Start of Care Date 09/01/21   Referring Physician Daily Goldberg   Patient/Family Goals Statement Improve back pain   Orders Evaluate  and Treat   Date of Order 07/29/21   Certification Required? Yes   Medical Diagnosis chronic bilateral low back pain with bilateral sciatica   Surgical/Medical history reviewed Yes   Precautions/Limitations no known precautions/limitations       Present Yes   Language Other  (Pashto on line)   Presentation and Etiology   Pertinent history of current problem (include personal factors and/or comorbidities that impact the POC) patient went to ER with dizziness and chest and return home   Functional Limitations perform activities of daily living;perform desired leisure / sports activities   Symptom Location bilateral L4 S1   Pain rating (0-10 point scale) Worst (/10)  (6-7/10 seated low back)   Best (/10) 2/10   Worst (/10) 9/10  (current 6/10)   Pain quality B. Dull   Frequency of pain/symptoms A. Constant   Pain/symptoms are: Worse during the day   Pain/symptoms exacerbated by B. Walking;G. Certain positions   Pain/symptoms eased by C. Rest   Progression of symptoms since onset: Worsened   Current / Previous Interventions   Diagnostic Tests: X-ray   X-ray Results unremarkable   Prior Level of Function   Prior Level of Function-Mobility independent   Prior Level of Function-ADLs independent   Current Level of Function   Current Community Support Family/friend caregiver   Patient role/employment history C. Homemaker   Living environment [place]   Fall Risk Screen   Fall screen completed by PT   Have you fallen 2 or more times in the past year? No   Have you fallen and had an injury in the past year? No   Is patient a fall risk? Department fall risk interventions implemented   Fall screen comments family was instructed on safety at home   Abuse Screen (yes response referral indicated)   Feels Unsafe at Home or Work/School no   Feels Threatened by Someone no   Does Anyone Try to Keep You From Having Contact with Others or Doing Things Outside Your Home? no   Physical Signs of Abuse Present no    System Outcome Measures   Outcome Measures Low Back Pain (see Oswestry and Elliott)   Functional Scales   Functional Scales   (OSWESTRY 80 %)   Lumbar Spine/SI Objective Findings   Gait/Locomotion sec antalgic, flexed forward   Flexion ROM 16 inch   Extension ROM -10o   Right Side Bending ROM 5o   Left Side Bending ROM 10o   Lumbar ROM Comment lumbar rotation bilateral increases pain 5/10   SLR positive   Palpation bilateral quadratus lumbarum with muscle tension   Planned Therapy Interventions   Planned Therapy Interventions gait training;manual therapy;ROM;strengthening;stretching   Clinical Impression   Criteria for Skilled Therapeutic Interventions Met yes, treatment indicated   PT Diagnosis bilateral chronic low back pain   Influenced by the following impairments  with bilateral sciatica   Functional limitations due to impairments antalgis gait, difficulties with functional mobiliy   Clinical Presentation Stable/Uncomplicated   Clinical Decision Making (Complexity) Low complexity   Therapy Frequency 1 time/week   Predicted Duration of Therapy Intervention (days/wks) 5 weeks   Risk & Benefits of therapy have been explained Yes   Patient, Family & other staff in agreement with plan of care Yes   Clinical Impression Comments patient with bilateral L4S1 muscle tenderness and limited ambulation, poor flexed forward posture, limited functional mobiility, instructed patient's family on exercises and self care.   Education Assessment   Preferred Learning Style Demonstration;Listening   Ortho Goal 1   Goal Identifier low back pain   Goal Description patient will have decreased of low back pain to 4/10 to allow safe independent ambulation   Target Date 10/01/21   Ortho Goal 2   Goal Identifier unsteady. antalgic gait   Goal Description patient will improve gait jaxon and posture   Target Date 10/01/21   Total Evaluation Time   PT Eval, Low Complexity Minutes (79586) 30   Therapy Certification   Certification date from  09/01/21   Certification date to 10/01/21   Medical Diagnosis chronic bilateral low back pain with bilateral sciatica

## 2021-09-02 ENCOUNTER — IMMUNIZATION (OUTPATIENT)
Dept: NURSING | Facility: CLINIC | Age: 51
End: 2021-09-02
Attending: FAMILY MEDICINE
Payer: COMMERCIAL

## 2021-09-02 PROCEDURE — 0012A PR COVID VAC MODERNA 100 MCG/0.5 ML IM: CPT

## 2021-09-02 PROCEDURE — 91301 PR COVID VAC MODERNA 100 MCG/0.5 ML IM: CPT

## 2021-09-03 ENCOUNTER — TELEPHONE (OUTPATIENT)
Dept: PULMONOLOGY | Facility: OTHER | Age: 51
End: 2021-09-03

## 2021-09-03 NOTE — TELEPHONE ENCOUNTER
Incoming call from Daughter-in-law. With help from a [facility] . Called to say she is doing a little better and no complaints at this time. Instructed to call if any increased breathing issues or SOB.

## 2021-09-07 ENCOUNTER — OFFICE VISIT (OUTPATIENT)
Dept: FAMILY MEDICINE | Facility: CLINIC | Age: 51
End: 2021-09-07
Payer: COMMERCIAL

## 2021-09-07 VITALS
BODY MASS INDEX: 21.12 KG/M2 | SYSTOLIC BLOOD PRESSURE: 116 MMHG | TEMPERATURE: 97.8 F | HEART RATE: 90 BPM | DIASTOLIC BLOOD PRESSURE: 70 MMHG | HEIGHT: 64 IN | OXYGEN SATURATION: 95 % | RESPIRATION RATE: 16 BRPM | WEIGHT: 123.7 LBS

## 2021-09-07 DIAGNOSIS — N95.1 VAGINAL DRYNESS, MENOPAUSAL: ICD-10-CM

## 2021-09-07 DIAGNOSIS — J47.9 BRONCHIECTASIS WITHOUT COMPLICATION (H): ICD-10-CM

## 2021-09-07 DIAGNOSIS — I10 ESSENTIAL HYPERTENSION: ICD-10-CM

## 2021-09-07 DIAGNOSIS — Z23 NEED FOR INFLUENZA VACCINATION: ICD-10-CM

## 2021-09-07 DIAGNOSIS — F32.1 MODERATE MAJOR DEPRESSION (H): ICD-10-CM

## 2021-09-07 DIAGNOSIS — I63.50 RIGHT PONTINE STROKE (H): ICD-10-CM

## 2021-09-07 DIAGNOSIS — Z79.4 TYPE 2 DIABETES MELLITUS TREATED WITH INSULIN (H): Primary | ICD-10-CM

## 2021-09-07 DIAGNOSIS — E11.9 TYPE 2 DIABETES MELLITUS TREATED WITH INSULIN (H): Primary | ICD-10-CM

## 2021-09-07 DIAGNOSIS — J45.42 MODERATE PERSISTENT ASTHMA WITH STATUS ASTHMATICUS: ICD-10-CM

## 2021-09-07 PROCEDURE — 99214 OFFICE O/P EST MOD 30 MIN: CPT | Mod: 25 | Performed by: FAMILY MEDICINE

## 2021-09-07 PROCEDURE — 90471 IMMUNIZATION ADMIN: CPT | Performed by: FAMILY MEDICINE

## 2021-09-07 PROCEDURE — 90686 IIV4 VACC NO PRSV 0.5 ML IM: CPT | Performed by: FAMILY MEDICINE

## 2021-09-07 RX ORDER — BENZOCAINE/MENTHOL 6 MG-10 MG
LOZENGE MUCOUS MEMBRANE 2 TIMES DAILY
Qty: 60 G | Refills: 0 | Status: SHIPPED | OUTPATIENT
Start: 2021-09-07 | End: 2022-07-14

## 2021-09-07 ASSESSMENT — MIFFLIN-ST. JEOR: SCORE: 1151.35

## 2021-09-07 NOTE — PROGRESS NOTES
ASSESMENT AND PLAN:    Type 2 diabetes mellitus treated with insulin (H)  Increased Lantus to 14 units daily.  - insulin glargine (LANTUS PEN) 100 UNIT/ML pen; Inject 14 Units Subcutaneous At Bedtime    Vaginal dryness, menopausal  Declined pelvic exam.  - hydrocortisone (CORTAID) 1 % external cream; Apply topically 2 times daily    Moderate persistent asthma with status asthmaticus  Managed pulmonology.    Need for influenza vaccination  - HC FLU VAC PRESRV FREE QUAD SPLIT VIR > 6 MONTHS IM    Essential hypertension  Controlled.    Bronchiectasis without complication (H)  Managed by pulmonology.    Moderate major depression (H)  Stable.    Right pontine stroke (H)  No acute stroke symptoms.   This transcription uses voice recognition software, which may contain typographical errors.      SUBJECTIVE: Kulwant Amin is here for ED follow-up.  She was seen in the ED on 8/12/2021, 8/18/2021 and most recently on 8/27/2021 due to hyperglycemia, chest pain and dizziness.  She does have history of CAD, s/p stent placement followed by cardiology.  She is here with her daughter-in-law.  Daughter-in-law states her breathing is improved significantly.  They called pulmonology due to cough and was prescribed cough medicine.  She has appointment scheduled to see pulmonology on 9/27/2021.  She is back to her baseline per daughter-in-law and patient.  Her blood sugar are in the 200 range most days in the recent weeks week occasional 300s- 400s.  He denies hypo or hyperglycemic symptoms.  She is scheduled to have physical therapy for back pain.  She is complaining of vaginal dryness for about 2 months.  No postmenopausal bleeding.  No vaginal discharge.  No dysuria or frequency.  No fever.    Past Medical History:   Diagnosis Date     Acute asthma exacerbation 1/6/2020     Acute blood loss anemia      Anxiety      Arthritis      Asthma      Asthma exacerbation 11/19/2015     Chest wall pain 12/26/2017    Added automatically from  request for surgery [id number]     COPD (chronic obstructive pulmonary disease) (H)      Coronary artery disease due to lipid rich plaque 1/25/2018     Depression      Diabetes mellitus, type II (H)      Essential hypertension 4/14/2017     Generalized headaches      GERD (gastroesophageal reflux disease)      History of anesthesia complications     nausea and vomiting     Lactic acid acidosis 11/23/2018     Lower GI bleeding      Nonspecific chest pain 12/07/2018    2 negative stress tests since coronary stenting in 2018; in June 2020 she had 1 week of this pain with normal cardiac enzymes and relief with Toradol     Pneumonia      SVT (supraventricular tachycardia) (H)      TB lung, latent     9 mos INH     Patient Active Problem List   Diagnosis     Pulmonary nodule, right     Environmental allergies     TB lung, latent     COPD (chronic obstructive pulmonary disease)/Asthma      HTN (hypertension)     Esophageal reflux (GERD)     Bronchiectasis (H)     Hyperthyroidism     Cataracts, bilateral     Corneal opacity     Irregular astigmatism     Chest pain     Anxiety     Asthma     Cerebrovascular accident of right pontine structure (H)     Chronic nonintractable headache, unspecified headache type     Coronary artery disease due to lipid rich plaque     Cough     Dizziness     Elevated troponin     Hyperlipidemia     Microcytic anemia     Migraine headache     Moderate major depression (H)     Moderate persistent asthma     Nonspecific (abnormal) findings on radiological and other examination of other intrathoracic organs     Nonspecific reaction to tuberculin skin test without active tuberculosis     Pneumonia     Type 2 diabetes mellitus treated with insulin (H)     Unspecified visual loss     Chronic obstructive pulmonary disease, unspecified COPD type (H)     Dyspnea     SOB (shortness of breath)     Essential hypertension     GERD (gastroesophageal reflux disease)     Pre-syncope     Latent tuberculosis      "Dental caries     Asthma with exacerbation     Nonspecific chest pain       Allergies:  No Known Allergies    History   Smoking Status     Former Smoker     Packs/day: 1.00     Years: 30.00     Types: Cigarettes, Cigarettes, Cigarettes     Quit date: 1/1/2003   Smokeless Tobacco     Never Used     Comment: No passive exposure       Review of systems otherwise negative except as listed in HPI.   History   Smoking Status     Former Smoker     Packs/day: 1.00     Years: 30.00     Types: Cigarettes, Cigarettes, Cigarettes     Quit date: 1/1/2003   Smokeless Tobacco     Never Used     Comment: No passive exposure       OBJECTICE: /70   Pulse 90   Temp 97.8  F (36.6  C) (Oral)   Resp 16   Ht 1.626 m (5' 4.02\")   Wt 56.1 kg (123 lb 11.2 oz)   SpO2 95%   BMI 21.22 kg/m      DATA REVIEWED:  Additional History from Old Records Summarized (2):   Radiology Tests Summarized or Ordered (1): Normal chest x-ray on 8/27/2021.  Benign CT head on 8/27/2021.  CT chest PE run was negative on 8/3/2021.  Labs Reviewed or Ordered (1):       GEN-alert,  in no apparent distress.  HEENT-mucous membranes are moist, neck is supple.  CV-regular rate and rhythm with no murmur.   RESP-lungs clear to auscultation .  ABDOMEN- Soft , not tender.  EXTREM- No edema.  PELVIC-declined pelvic exam ( just wants medication for vaginal dryness and irritation)  SKIN-normal        Adrien Cardoza MD   9/7/2021     "

## 2021-09-08 ENCOUNTER — HOSPITAL ENCOUNTER (OUTPATIENT)
Dept: PHYSICAL THERAPY | Facility: REHABILITATION | Age: 51
End: 2021-09-08
Payer: COMMERCIAL

## 2021-09-08 DIAGNOSIS — G89.29 CHRONIC BILATERAL LOW BACK PAIN WITH BILATERAL SCIATICA: Primary | ICD-10-CM

## 2021-09-08 DIAGNOSIS — M79.18 MYOFASCIAL PAIN: ICD-10-CM

## 2021-09-08 DIAGNOSIS — R26.9 GAIT DIFFICULTY: ICD-10-CM

## 2021-09-08 DIAGNOSIS — M54.41 CHRONIC BILATERAL LOW BACK PAIN WITH BILATERAL SCIATICA: Primary | ICD-10-CM

## 2021-09-08 DIAGNOSIS — M54.42 CHRONIC BILATERAL LOW BACK PAIN WITH BILATERAL SCIATICA: Primary | ICD-10-CM

## 2021-09-08 DIAGNOSIS — M51.369 BULGING LUMBAR DISC: ICD-10-CM

## 2021-09-08 DIAGNOSIS — M48.061 LUMBAR FORAMINAL STENOSIS: ICD-10-CM

## 2021-09-08 PROCEDURE — 97110 THERAPEUTIC EXERCISES: CPT | Mod: GP | Performed by: PHYSICAL THERAPY ASSISTANT

## 2021-09-08 PROCEDURE — 97140 MANUAL THERAPY 1/> REGIONS: CPT | Mod: GP | Performed by: PHYSICAL THERAPY ASSISTANT

## 2021-09-10 PROBLEM — Z86.73 H/O ARTERIAL ISCHEMIC STROKE: Status: ACTIVE | Noted: 2021-09-10

## 2021-09-16 ENCOUNTER — OFFICE VISIT (OUTPATIENT)
Dept: PULMONOLOGY | Facility: OTHER | Age: 51
End: 2021-09-16
Payer: COMMERCIAL

## 2021-09-16 VITALS
DIASTOLIC BLOOD PRESSURE: 60 MMHG | BODY MASS INDEX: 21.41 KG/M2 | HEART RATE: 105 BPM | OXYGEN SATURATION: 98 % | WEIGHT: 124.8 LBS | SYSTOLIC BLOOD PRESSURE: 104 MMHG

## 2021-09-16 DIAGNOSIS — R06.02 SOB (SHORTNESS OF BREATH): ICD-10-CM

## 2021-09-16 DIAGNOSIS — R09.89 PULMONARY VASCULAR CONGESTION: ICD-10-CM

## 2021-09-16 DIAGNOSIS — J45.40 MODERATE PERSISTENT ASTHMA WITHOUT COMPLICATION: Primary | ICD-10-CM

## 2021-09-16 PROCEDURE — 99213 OFFICE O/P EST LOW 20 MIN: CPT | Performed by: INTERNAL MEDICINE

## 2021-09-16 PROCEDURE — 99207 PR CDG-MDM COMPONENT: MEETS MODERATE - DOWN CODED: CPT | Performed by: INTERNAL MEDICINE

## 2021-09-16 RX ORDER — FUROSEMIDE 20 MG
20 TABLET ORAL EVERY OTHER DAY
Qty: 4 TABLET | Refills: 1 | Status: SHIPPED | OUTPATIENT
Start: 2021-09-16 | End: 2021-11-30

## 2021-09-16 RX ORDER — ALBUTEROL SULFATE 90 UG/1
2 AEROSOL, METERED RESPIRATORY (INHALATION) EVERY 4 HOURS PRN
Qty: 4 G | Refills: 11 | Status: SHIPPED | OUTPATIENT
Start: 2021-09-16 | End: 2022-08-26

## 2021-09-16 ASSESSMENT — ASTHMA QUESTIONNAIRES
QUESTION_2 LAST FOUR WEEKS HOW OFTEN HAVE YOU HAD SHORTNESS OF BREATH: MORE THAN ONCE A DAY
HOSPITALIZATION_OVERNIGHT_LAST_YEAR_TOTAL: THREE OR MORE
EMERGENCY_ROOM_LAST_YEAR_TOTAL: THREE OR MORE
QUESTION_1 LAST FOUR WEEKS HOW MUCH OF THE TIME DID YOUR ASTHMA KEEP YOU FROM GETTING AS MUCH DONE AT WORK, SCHOOL OR AT HOME: MOST OF THE TIME
QUESTION_5 LAST FOUR WEEKS HOW WOULD YOU RATE YOUR ASTHMA CONTROL: NOT CONTROLLED AT ALL
ACT_TOTALSCORE: 7
QUESTION_4 LAST FOUR WEEKS HOW OFTEN HAVE YOU USED YOUR RESCUE INHALER OR NEBULIZER MEDICATION (SUCH AS ALBUTEROL): ONE OR TWO TIMES PER DAY
QUESTION_3 LAST FOUR WEEKS HOW OFTEN DID YOUR ASTHMA SYMPTOMS (WHEEZING, COUGHING, SHORTNESS OF BREATH, CHEST TIGHTNESS OR PAIN) WAKE YOU UP AT NIGHT OR EARLIER THAN USUAL IN THE MORNING: FOUR OR MORE NIGHTS A WEEK

## 2021-09-16 NOTE — PROGRESS NOTES
Assessment/Plan:    50 y.o.-year-old woman with history of organic fuel exposure in the home; visit was conducted through a [place] .  Her PFTs have been previously noted not to be diagnostic due to poor effort.  Was evaluated by Dr. Marie and initiated on Dupixent and has been doing significantly better since then.  For the present we would recommend;    Continue Breo Ellipta 1 puff daily.  She knows to gargle after using this.    Continue Spiriva.    Continue to use 3 times daily albuterol nebs.    I am concerned about ongoing prednisone use given that she has had multiple emergency room visits with hypoglycemic episodes and will not be reinitiating this.  Given her response to Lasix I have administered a weeklong supply of every other day of Lasix and asked her daughter know to call us and let us know how the patient is doing.  She continues to have improvement with this we may prescribe this to her on an ongoing basis.    Continue Protonix 40 mg daily.    She is up-to-date with her influenza vaccination.    Continue Dupixent injections.    She has not yet received her Covid-19 vaccine and I will clarify with Dr. Marie if it is okay for her to receive this but from my perspective she is at high risk if she does contract Covid-19 and should certainly be vaccinated.    Tamra ALCANTARA [facility]  Pulmonary and Critical Care  [id number]      Subjective:    Patient ID: Ms. Amin is a 49 y.o.female with a past medical history significant for anxiety, arthritis, questionable asthma versus COPD, diabetes, hypertension and a prior history of SVT presents with a follow-up visit for her pulmonary complaints.  She follows with me fairly closely for her moderate-persistent, difficult to control asthma symptoms.  Since her last clinic visit with me, she was seen in the emergency room *3, twice for hypoglycemia and once for concerns of a new stroke.  She had activated her action plan twice in between these episodes and was  administered prednisone with antibiotic with no significant improvement. She had a CXR during one of her ER visit which demonstrated some pulmonary vascular congestion and I had prescribed her a 3-day course of 20 mg a day of Lasix.  She tells me that her breathing improved significantly with this.  She continues to use Dupixent twice a week and states that at the end of the first week of receiving these infusions her breathing generally worsens.  Again she has no timing for when her symptoms are worse.    Pertinent past medical history:  48-year-old female here for follow-up.  Her breathing is a bit worse than 6 months ago.  She had multiple ER visits and evaluations.  This includes negative PE study.  She had a cath with a 75% LAD lesion which was intervened upon.  Since her catheterization she feels she has more heartburn.  Her breathing is worse with exertion.  Improves with rest.  It is also worse with cold weather.  Warm weather and humid air does not bother.  Symptoms localized to the chest.  Pertinent positives include burning sensation in the chest.  Pertinent negatives include no fever or hemoptysis.    Current Outpatient Medications   Medication Sig Dispense Refill     acetaminophen (TYLENOL) 500 MG tablet Take 2 tablets (1,000 mg) by mouth every 8 hours 100 tablet 0     albuterol (PROAIR HFA/PROVENTIL HFA/VENTOLIN HFA) 108 (90 Base) MCG/ACT inhaler Inhale 2 puffs into the lungs every 4 hours as needed for shortness of breath / dyspnea 4 g 11     albuterol (PROVENTIL) (2.5 MG/3ML) 0.083% neb solution Inhale 2.5 mg into the lungs       amitriptyline (ELAVIL) 10 MG tablet 2 tablets At Bedtime       ASPIRIN LOW DOSE 81 MG EC tablet        atorvastatin (LIPITOR) 80 MG tablet TAKE 1 TABLET (80 MG TOTAL) BY MOUTH AT BEDTIME FOR CHOLESTEROL       clopidogrel (PLAVIX) 75 MG tablet Take 1 tablet (75 mg) by mouth daily 90 tablet 3     colchicine (COLCYRS) 0.6 MG tablet TAKE 1 TABLET (0.6 MG TOTAL) BY MOUTH DAILY        cyclobenzaprine (FLEXERIL) 5 MG tablet Take 1-2 tablets (5-10 mg) by mouth 3 times daily as needed for muscle spasms 30 tablet 3     Dupilumab (DUPIXENT) 200 MG/1.14ML SOSY Inject 200 mg Subcutaneous every 14 days 1.14 mL 6     ferrous sulfate (FEROSUL) 325 (65 Fe) MG tablet Take 325 mg by mouth       fluticasone-vilanterol (BREO ELLIPTA) 200-25 MCG/INH inhaler Inhale 1 puff into the lungs       furosemide (LASIX) 20 MG tablet Take 1 tablet (20 mg) by mouth every other day for 8 doses 4 tablet 1     gabapentin (NEURONTIN) 300 MG capsule Take 2 capsules (600 mg) by mouth 3 times daily 180 capsule 3     guaiFENesin (ROBITUSSIN) 100 MG/5ML liquid Take 10 mLs (200 mg) by mouth every 4 hours as needed for cough 200 mL 1     guaiFENesin-codeine (ROBITUSSIN AC) 100-10 MG/5ML SOLN Take 10 mLs by mouth every 6 hours as needed for cough 120 mL 0     hydrochlorothiazide (MICROZIDE) 12.5 MG capsule Take 1 capsule (12.5 mg) by mouth daily 30 capsule 6     hydrocortisone (CORTAID) 1 % external cream Apply topically 2 times daily 60 g 0     insulin aspart (NOVOLOG FLEXPEN) 100 UNIT/ML pen Give before meals : For Pre-Meal Glucose: 140-189 give 1 unit, 190-239 give 2 units, 240-289 give 3 units, 290-339 give 4 units, = or >340 give 5 units. 15 mL 0     insulin glargine (LANTUS PEN) 100 UNIT/ML pen Inject 14 Units Subcutaneous At Bedtime 15 mL 3     insulin pen needle (32G X 4 MM) 32G X 4 MM miscellaneous Use one pen needles daily or as directed. 200 each 11     insulin pen needle (32G X 4 MM) 32G X 4 MM miscellaneous Use one pen needle daily as needed for SSI 90 each 0     ipratropium - albuterol 0.5 mg/2.5 mg/3 mL (DUONEB) 0.5-2.5 (3) MG/3ML neb solution Take 1 vial (3 mLs) by nebulization every 6 hours as needed for shortness of breath / dyspnea or wheezing 240 mL 11     loratadine (CLARITIN) 10 MG tablet Take 10 mg by mouth daily       meclizine (ANTIVERT) 25 MG tablet Take 25 mg by mouth daily as needed        metFORMIN  (GLUCOPHAGE-XR) 750 MG 24 hr tablet Take 750 mg by mouth daily (with breakfast)       metoprolol tartrate (LOPRESSOR) 25 MG tablet TAKE 1 TABLET (25 MG TOTAL) BY MOUTH 2 (TWO) TIMES A DAY FOR BLOOD PRESSURE       montelukast (SINGULAIR) 10 MG tablet Take 1 tablet (10 mg) by mouth At Bedtime 30 tablet 6     omeprazole (PRILOSEC) 40 MG DR capsule 1 capsule daily       polyethylene glycol (MIRALAX) powder Take 17 g by mouth daily 510 g 1     polyvinyl alcohol (ARTIFICIAL TEARS) 1.4 % ophthalmic solution Place 1 drop into both eyes as needed for dry eyes 15 mL 3     sucralfate (CARAFATE) 1 GM tablet Take 1 g by mouth       tiotropium (SPIRIVA RESPIMAT) 2.5 MCG/ACT inhalation aerosol Inhale 2 puffs into the lungs daily 4 g 1     furosemide (LASIX) 20 MG tablet Take 1 tablet (20 mg) by mouth daily for 3 days 3 tablet 0       Physical Exam   /60   Pulse 105   Wt 56.6 kg (124 lb 12.8 oz)   SpO2 98%   BMI 21.41 kg/m    Physical Exam  Constitutional:       Appearance: Normal appearance.   HENT:      Head: Normocephalic.      Mouth/Throat:      Mouth: Mucous membranes are moist.   Cardiovascular:      Rate and Rhythm: Normal rate and regular rhythm.      Heart sounds: Normal heart sounds.   Pulmonary:      Effort: Pulmonary effort is normal.      Breath sounds: Normal breath sounds. No wheezing.   Abdominal:      General: Abdomen is flat.   Skin:     General: Skin is warm.   Neurological:      General: No focal deficit present.      Mental Status: She is alert.               Tamra Duong MD  Pulmonary and Critical Care  (p) [phone]

## 2021-09-17 ASSESSMENT — ASTHMA QUESTIONNAIRES: ACT_TOTALSCORE: 7

## 2021-09-20 ENCOUNTER — OFFICE VISIT (OUTPATIENT)
Dept: FAMILY MEDICINE | Facility: CLINIC | Age: 51
End: 2021-09-20
Payer: COMMERCIAL

## 2021-09-20 VITALS
BODY MASS INDEX: 21.38 KG/M2 | RESPIRATION RATE: 12 BRPM | HEART RATE: 82 BPM | SYSTOLIC BLOOD PRESSURE: 121 MMHG | TEMPERATURE: 98.6 F | OXYGEN SATURATION: 99 % | DIASTOLIC BLOOD PRESSURE: 81 MMHG | WEIGHT: 124.6 LBS

## 2021-09-20 DIAGNOSIS — M79.644 PAIN OF RIGHT THUMB: Primary | ICD-10-CM

## 2021-09-20 PROCEDURE — 99214 OFFICE O/P EST MOD 30 MIN: CPT | Performed by: NURSE PRACTITIONER

## 2021-09-20 RX ORDER — PREDNISONE 2.5 MG/1
2.5 TABLET ORAL DAILY
COMMUNITY
Start: 2021-09-18 | End: 2022-05-04

## 2021-09-20 RX ORDER — IBUPROFEN 200 MG
400 TABLET ORAL ONCE
Status: COMPLETED | OUTPATIENT
Start: 2021-09-20 | End: 2021-09-20

## 2021-09-20 RX ADMIN — Medication 400 MG: at 15:42

## 2021-09-20 ASSESSMENT — ENCOUNTER SYMPTOMS
FEVER: 0
CHILLS: 0
WEAKNESS: 1

## 2021-09-20 NOTE — PATIENT INSTRUCTIONS
Wear splint most of the day, may remove briefly occasionally for showers and other.    Take an Aleve twice daily for discomfort and inflammation.    Do not take ibuprofen or other anti-inflammatory medications.    Take your first dose of Aleve this evening.  Each dose last 12 hours.    If your thumb is not much better with this in 2 weeks, please be rechecked.  If symptoms are gone within 2 weeks, you can stop the medicine and remove the brace.    You may need to hold glasses or similar with both hands.     Your x-ray did not show any broken bones and no arthritis in that joint.  This could be a tendon or ligament injury or pain.

## 2021-09-20 NOTE — PROGRESS NOTES
"Assessment & Plan     Pain of right thumb    - XR Finger Right G/E 2 Views  - ibuprofen (ADVIL/MOTRIN) tablet 400 mg  - Wrist/Arm/Hand Supplies Order for DME - ONLY FOR DME  - naproxen (NAPROSYN) 375 MG tablet  Dispense: 28 tablet; Refill: 0     Tender over MCP joint of right thumb difficulty gripping and moving from.  No trauma.  Differential includes gout, septic joint, de Quervain's tenosynovitis.    As the pain is only localized to the joint area and the joint is not swollen nor erythematous, do not suspect gout at this time nor septic joint.    No arthritis identified in the x-ray.    For suspected ligamentous or tendon strain in the area, recommend thumb spica splint which was provided, twice daily Aleve for 2 weeks.  No gripping with use of thumb.  Follow-up if no better in 2 weeks.              Return in about 2 weeks (around 10/4/2021) for If no better.    Martine Perez, [facility]     Kulwant is a 51 year old female who presents to clinic today for the following health issues:  Chief Complaint   Patient presents with     Hand Pain     Pain in R thumb. x1 week      HPI    Pain at the base of her thumb for about 1 week.      No trauma. Can't hold a glass due to pain.      Does not work.  No obvious overuse.     Sharp.      Hurts with and without movement. No joint swelling.  Has been on daily colchicine related to pericarditis.     Has joint pain in the legs.  Tried tylenol \"2 tabs\" twice daily.      Hx type II diabetes and CVA - didn't affect rt arm - only left side symptoms.            Review of Systems   Constitutional: Negative for chills and fever.   Skin: Negative for rash.   Neurological: Positive for weakness (Chronic left-sided due to stroke.  Can hold items in the left hand, but it is more difficult.).           Objective    /81 (BP Location: Right arm, Patient Position: Sitting, Cuff Size: Adult Regular)   Pulse 82   Temp 98.6  F (37  C) (Oral)   " Resp 12   Wt 56.5 kg (124 lb 9.6 oz)   SpO2 99%   BMI 21.38 kg/m    Physical Exam  Constitutional:       General: She is not in acute distress.     Appearance: She is well-developed.   Eyes:      General:         Right eye: No discharge.         Left eye: No discharge.      Conjunctiva/sclera: Conjunctivae normal.   Cardiovascular:      Pulses: Normal pulses.   Pulmonary:      Effort: Pulmonary effort is normal.   Musculoskeletal:         General: Normal range of motion.      Comments: Rt thumb: Painful abducting and abducting thumb with decreased ROM.  Painful gripping.    +Tender over MCP joint. No swelling/erythema over joint.  No tenderness below the area of the MCP joint.   Skin:     General: Skin is warm and dry.      Capillary Refill: Capillary refill takes less than 2 seconds.   Neurological:      Mental Status: She is alert and oriented to person, place, and time.   Psychiatric:         Mood and Affect: Mood normal.         Behavior: Behavior normal.         Thought Content: Thought content normal.         Judgment: Judgment normal.        Xray reviewed by Martine Perez, CNP: No acute abnormalities seen.         Results for orders placed or performed in visit on 09/20/21   XR Finger Right G/E 2 Views     Status: None    Narrative    EXAM DATE:         09/20/2021    EXAM: X-RAY EXAM OF FINGER(S),RIGHT,MINIMUM TWO VIEWS  LOCATION: [facility]  DATE/TIME: 9/20/2021 4:00 PM    INDICATION: 1st finger mcp joint pain  COMPARISON: None.    IMPRESSION: No acute fracture. Mild degenerative arthritis of the thumb CMC joint. Thumb MCP and IP joints negative.                   No results found for this or any previous visit (from the past 24 hour(s)).

## 2021-09-28 ENCOUNTER — OFFICE VISIT (OUTPATIENT)
Dept: FAMILY MEDICINE | Facility: CLINIC | Age: 51
End: 2021-09-28
Payer: COMMERCIAL

## 2021-09-28 VITALS
HEART RATE: 90 BPM | RESPIRATION RATE: 16 BRPM | WEIGHT: 121.44 LBS | HEIGHT: 64 IN | OXYGEN SATURATION: 98 % | TEMPERATURE: 97.7 F | BODY MASS INDEX: 20.73 KG/M2 | SYSTOLIC BLOOD PRESSURE: 120 MMHG | DIASTOLIC BLOOD PRESSURE: 78 MMHG

## 2021-09-28 DIAGNOSIS — J47.9 BRONCHIECTASIS WITHOUT COMPLICATION (H): ICD-10-CM

## 2021-09-28 DIAGNOSIS — M79.644 PAIN OF RIGHT THUMB: ICD-10-CM

## 2021-09-28 DIAGNOSIS — I63.50 CEREBROVASCULAR ACCIDENT OF RIGHT PONTINE STRUCTURE (H): ICD-10-CM

## 2021-09-28 DIAGNOSIS — R11.0 NAUSEA WITHOUT VOMITING: ICD-10-CM

## 2021-09-28 DIAGNOSIS — Z79.4 TYPE 2 DIABETES MELLITUS TREATED WITH INSULIN (H): Primary | ICD-10-CM

## 2021-09-28 DIAGNOSIS — J44.9 CHRONIC OBSTRUCTIVE PULMONARY DISEASE, UNSPECIFIED COPD TYPE (H): ICD-10-CM

## 2021-09-28 DIAGNOSIS — F32.1 MODERATE MAJOR DEPRESSION (H): ICD-10-CM

## 2021-09-28 DIAGNOSIS — E11.9 TYPE 2 DIABETES MELLITUS TREATED WITH INSULIN (H): Primary | ICD-10-CM

## 2021-09-28 PROCEDURE — 99214 OFFICE O/P EST MOD 30 MIN: CPT | Performed by: FAMILY MEDICINE

## 2021-09-28 RX ORDER — ONDANSETRON 4 MG/1
4 TABLET, FILM COATED ORAL EVERY 8 HOURS PRN
Qty: 30 TABLET | Refills: 0 | Status: SHIPPED | OUTPATIENT
Start: 2021-09-28 | End: 2021-11-30

## 2021-09-28 ASSESSMENT — MIFFLIN-ST. JEOR: SCORE: 1141.16

## 2021-09-28 NOTE — PROGRESS NOTES
ASSESMENT AND PLAN:    Type 2 diabetes mellitus treated with insulin (H)  Last A1c 9.2 in 8/2021.  Lantus was increased.  No changes for now.  Follow-up in 2 months.  Recheck labs.    Moderate major depression (H)  Stable.    Chronic obstructive pulmonary disease, unspecified COPD type (H)  Managed by pulmonology.    Bronchiectasis without complication (H)  Managed by pulmonology.    Cerebrovascular accident of right pontine structure (H)  No residual weaknesses.    Pain of right thumb  Aloe up with Ortho as scheduled.  - diclofenac (VOLTAREN) 1 % topical gel; Apply 2 g topically 4 times daily    Nausea without vomiting  - ondansetron (ZOFRAN) 4 MG tablet; Take 1 tablet (4 mg) by mouth every 8 hours as needed for nausea or vomiting    This transcription uses voice recognition software, which may contain typographical errors.      SUBJECTIVE: Kulwant Amin is a 51-year-old female with history of multiple chronic medical problems including but not limited to diabetes, depression, COPD, history of stroke, [place] artery disease and chronic low back pain here to follow-up on diabetes.  She has multiple specialties involved in her care.  She sees pulmonology and cardiology regularly.  She has appointment scheduled to see Ortho for her back pain next week.  She was recently seen at the urgent care due to right thumb pain.  Reviewed.  X-ray was unremarkable.  She was given naproxen.  She also has history of GERD.  The pain is not improving.  She is not able to bend her thumb.  No similar history in the past.  She uses Lantus 14 units daily and Metformin  mg daily.  She also has NovoLog for sliding scale coverage.  Fasting blood sugar in the recent days are 168, 150, 115, 100, 170, 125, 114 and the highest 205.  Night low blood sugar with hypoglycemic symptoms since last visit.  She recently saw pulmonology, note reviewed.  Steroid was discontinued due to episodes of hyperglycemia.  She is complaining of nausea in the  recent days.  No vomiting.  She is requesting medication for nausea.    Past Medical History:   Diagnosis Date     Acute asthma exacerbation 1/6/2020     Acute blood loss anemia      Anxiety      Arthritis      Asthma      Asthma exacerbation 11/19/2015     Chest wall pain 12/26/2017    Added automatically from request for surgery [id number]     COPD (chronic obstructive pulmonary disease) (H)      Coronary artery disease due to lipid rich plaque 1/25/2018     Depression      Diabetes mellitus, type II (H)      Essential hypertension 4/14/2017     Generalized headaches      GERD (gastroesophageal reflux disease)      History of anesthesia complications     nausea and vomiting     Lactic acid acidosis 11/23/2018     Lower GI bleeding      Nonspecific chest pain 12/07/2018    2 negative stress tests since coronary stenting in 2018; in June 2020 she had 1 week of this pain with normal cardiac enzymes and relief with Toradol     Pneumonia      SVT (supraventricular tachycardia) (H)      TB lung, latent     9 mos INH     Patient Active Problem List   Diagnosis     Pulmonary nodule, right     Environmental allergies     TB lung, latent     COPD (chronic obstructive pulmonary disease)/Asthma      HTN (hypertension)     Esophageal reflux (GERD)     Bronchiectasis (H)     Hyperthyroidism     Cataracts, bilateral     Corneal opacity     Irregular astigmatism     Chest pain     Anxiety     Asthma     Cerebrovascular accident of right pontine structure (H)     Chronic nonintractable headache, unspecified headache type     Coronary artery disease due to lipid rich plaque     Cough     Dizziness     Elevated troponin     Hyperlipidemia     Microcytic anemia     Migraine headache     Moderate major depression (H)     Moderate persistent asthma     Nonspecific (abnormal) findings on radiological and other examination of other intrathoracic organs     Nonspecific reaction to tuberculin skin test without active tuberculosis     Pneumonia      Type 2 diabetes mellitus treated with insulin (H)     Unspecified visual loss     Chronic obstructive pulmonary disease, unspecified COPD type (H)     Dyspnea     SOB (shortness of breath)     Essential hypertension     GERD (gastroesophageal reflux disease)     Pre-syncope     Latent tuberculosis     Dental caries     Asthma with exacerbation     Nonspecific chest pain     Acculturation difficulty     H/O arterial ischemic stroke       Allergies:  No Known Allergies    History   Smoking Status     Former Smoker     Packs/day: 1.00     Years: 30.00     Types: Cigarettes, Cigarettes, Cigarettes     Quit date: 1/1/2003   Smokeless Tobacco     Never Used     Comment: No passive exposure       Review of systems otherwise negative except as listed in HPI.   History   Smoking Status     Former Smoker     Packs/day: 1.00     Years: 30.00     Types: Cigarettes, Cigarettes, Cigarettes     Quit date: 1/1/2003   Smokeless Tobacco     Never Used     Comment: No passive exposure       OBJECTICE: There were no vitals taken for this visit.    DATA REVIEWED:  Additional History from Old Records Summarized (2):   Radiology Tests Summarized or Ordered (1):   Labs Reviewed or Ordered (1):       GEN-alert,  in no apparent distress.  HEENT-mucous membranes are moist, neck is supple.  CV-regular rate and rhythm with no murmur.   RESP-lungs-mild wheezing improving since last visit..  ABDOMEN- Soft , not tender.  EXTREM-right thumb-mild tenderness PCP joint.  No erythema or swelling.  No effusion noted.  SKIN-normal        Adrien Cardoza MD   9/28/2021

## 2021-09-30 DIAGNOSIS — Z53.9 DIAGNOSIS NOT YET DEFINED: Primary | ICD-10-CM

## 2021-09-30 PROCEDURE — G0179 MD RECERTIFICATION HHA PT: HCPCS | Performed by: FAMILY MEDICINE

## 2021-10-04 ENCOUNTER — HOSPITAL ENCOUNTER (OUTPATIENT)
Dept: PHYSICAL THERAPY | Facility: REHABILITATION | Age: 51
End: 2021-10-04
Payer: COMMERCIAL

## 2021-10-04 DIAGNOSIS — M54.42 CHRONIC BILATERAL LOW BACK PAIN WITH BILATERAL SCIATICA: Primary | ICD-10-CM

## 2021-10-04 DIAGNOSIS — M54.41 CHRONIC BILATERAL LOW BACK PAIN WITH BILATERAL SCIATICA: Primary | ICD-10-CM

## 2021-10-04 DIAGNOSIS — M48.061 LUMBAR FORAMINAL STENOSIS: ICD-10-CM

## 2021-10-04 DIAGNOSIS — M79.18 MYOFASCIAL PAIN: ICD-10-CM

## 2021-10-04 DIAGNOSIS — R26.9 GAIT DIFFICULTY: ICD-10-CM

## 2021-10-04 DIAGNOSIS — M51.369 BULGING LUMBAR DISC: ICD-10-CM

## 2021-10-04 DIAGNOSIS — G89.29 CHRONIC BILATERAL LOW BACK PAIN WITH BILATERAL SCIATICA: Primary | ICD-10-CM

## 2021-10-04 PROCEDURE — 97110 THERAPEUTIC EXERCISES: CPT | Mod: GP | Performed by: PHYSICAL THERAPY ASSISTANT

## 2021-10-05 NOTE — ADDENDUM NOTE
Encounter addended by: Graciela Alba, PT on: 10/5/2021 4:23 PM   Actions taken: Episode resolved, Clinical Note Signed

## 2021-10-05 NOTE — PROGRESS NOTES
"Outpatient Physical Therapy Discharge Note     Patient: Kuwlant Amin  : 1970    Beginning/End Dates of Reporting Period:  2021 to 10/4/2021    Referring Provider: Daily Goldberg Diagnosis: chronic bilateral low back pain     Client Self Report: Pt reports her back has been the same. Pt has been compliant with her HEP. Pt states the pain is intermittent. Pt reports that there is no particular activity that brings on the pain. \" Not too bad today. \" Pt rates her pain at a 4-5/10.  Pt states she \"strolls\" around her home with her cane.     Objective Measurements: [place] 74%          Goals:  All goals met.  Refer to 10/4/2021 note.    Plan:  Discharge from therapy.    Discharge:    Reason for Discharge: Patient has met all goals.      Discharge Plan: Patient to continue home program.    "

## 2021-10-07 ENCOUNTER — OFFICE VISIT (OUTPATIENT)
Dept: PULMONOLOGY | Facility: OTHER | Age: 51
End: 2021-10-07
Payer: COMMERCIAL

## 2021-10-07 VITALS
SYSTOLIC BLOOD PRESSURE: 118 MMHG | WEIGHT: 124.6 LBS | HEART RATE: 76 BPM | DIASTOLIC BLOOD PRESSURE: 74 MMHG | OXYGEN SATURATION: 97 % | BODY MASS INDEX: 21.37 KG/M2

## 2021-10-07 DIAGNOSIS — J45.50 SEVERE PERSISTENT ASTHMA WITHOUT COMPLICATION (H): Primary | ICD-10-CM

## 2021-10-07 PROCEDURE — 99213 OFFICE O/P EST LOW 20 MIN: CPT | Performed by: INTERNAL MEDICINE

## 2021-10-12 ENCOUNTER — TELEPHONE (OUTPATIENT)
Dept: PULMONOLOGY | Facility: OTHER | Age: 51
End: 2021-10-12

## 2021-10-12 DIAGNOSIS — E11.9 TYPE 2 DIABETES MELLITUS TREATED WITHOUT INSULIN (H): ICD-10-CM

## 2021-10-12 NOTE — TELEPHONE ENCOUNTER
"Prior Authorization Retail Medication Request    Medication/Dose: Breo ellipta 200-25mcg  ICD code (if different than what is on RX):  J45.41  Previously Tried and Failed:    Rationale:      Insurance Name:  [employer] [place]  Insurance ID: [id number]      Pharmacy Information (if different than what is on RX)  Name:  [facility]  Phone:        Fax fro [facility] stating that \"your patient's prior authorization for Breo is about to .  Action required by 10/15/2021  "

## 2021-10-13 DIAGNOSIS — I10 ESSENTIAL HYPERTENSION: ICD-10-CM

## 2021-10-13 RX ORDER — PEN NEEDLE, DIABETIC 31 GX5/16"
NEEDLE, DISPOSABLE MISCELLANEOUS
Qty: 100 EACH | Refills: 1 | Status: SHIPPED | OUTPATIENT
Start: 2021-10-13 | End: 2023-10-05

## 2021-10-13 RX ORDER — HYDROCHLOROTHIAZIDE 12.5 MG/1
CAPSULE ORAL
Qty: 90 CAPSULE | Refills: 2 | Status: SHIPPED | OUTPATIENT
Start: 2021-10-13 | End: 2022-07-06

## 2021-10-13 NOTE — TELEPHONE ENCOUNTER
"Last Written Prescription Date:  8/13/21  Last Fill Quantity: 90,  # refills: 0   Last office visit provider:  9/28/21     Requested Prescriptions   Pending Prescriptions Disp Refills     B-D U/F 31G X 8 MM insulin pen needle [Pharmacy Med Name: BD PEN NDL SHORT [id number] 31G X 8 MM Miscellaneous] 100 each 0     Sig: USE ONE PEN NEEDLE DAILY AS NEEDED FOR SSI       Diabetic Supplies Protocol Passed - 10/12/2021  9:48 AM        Passed - Medication is active on med list        Passed - Patient is 18 years of age or older        Passed - Recent (6 mo) or future (30 days) visit within the authorizing provider's specialty     Patient had office visit in the last 6 months or has a visit in the next 30 days with authorizing provider.  See \"Patient Info\" tab in inbasket, or \"Choose Columns\" in Meds & Orders section of the refill encounter.                 Eddie Warner RN 10/13/21 9:33 AM  "

## 2021-10-14 NOTE — TELEPHONE ENCOUNTER
"  Last office visit provider:  7/6/21     Requested Prescriptions   Pending Prescriptions Disp Refills     hydrochlorothiazide (MICROZIDE) 12.5 MG capsule [Pharmacy Med Name: HYDROCHLOROTHIAZIDE 12.5 MG 12.5 Capsule] 90 capsule 2     Sig: TAKE 1 CAPSULE (12.5 MG TOTAL) BY MOUTH DAILY FOR BLOOD PRESSURE       Diuretics (Including Combos) Protocol Passed - 10/13/2021  8:59 AM        Passed - Blood pressure under 140/90 in past 12 months     BP Readings from Last 3 Encounters:   10/07/21 118/74   09/28/21 120/78   09/20/21 121/81                 Passed - Recent (12 mo) or future (30 days) visit within the authorizing provider's specialty     Patient has had an office visit with the authorizing provider or a provider within the authorizing providers department within the previous 12 mos or has a future within next 30 days. See \"Patient Info\" tab in inbasket, or \"Choose Columns\" in Meds & Orders section of the refill encounter.              Passed - Medication is active on med list        Passed - Patient is age 18 or older        Passed - No active pregancy on record        Passed - Normal serum creatinine on file in past 12 months     Recent Labs   Lab Test 08/27/21  1245   CR 0.82              Passed - Normal serum potassium on file in past 12 months     Recent Labs   Lab Test 08/27/21  1245   POTASSIUM 3.9                    Passed - Normal serum sodium on file in past 12 months     Recent Labs   Lab Test 08/27/21  1245                 Passed - No positive pregnancy test in past 12 months             fartun harmon RN 10/13/21 9:16 PM  "

## 2021-10-14 NOTE — TELEPHONE ENCOUNTER
Central Prior Authorization Team   Phone: [phone]    PA Initiation    Medication: Breo ellipta 200-25mcg  Insurance Company:    Pharmacy Filling the Rx: [facility] - [place], [place] - [facility] [address]  Filling Pharmacy Phone: [phone]  Filling Pharmacy Fax: [phone]  Start Date: 10/14/2021

## 2021-10-15 NOTE — TELEPHONE ENCOUNTER
Prior Authorization Approval    Authorization Effective Date: 9/14/2021  Authorization Expiration Date: 10/14/2022  Medication: Breo ellipta 200-25mcg-APPROVED  Approved Dose/Quantity:    Reference #:     Insurance Company:    Expected CoPay:       CoPay Card Available:      Foundation Assistance Needed:    Which Pharmacy is filling the prescription (Not needed for infusion/clinic administered): [facility] - [place], [place] - [address]  Pharmacy Notified: Yes  Patient Notified: Yes  **Instructed pharmacy to notify patient when script is ready to /ship.**

## 2021-10-19 PROBLEM — F32.9 MAJOR DEPRESSION: Status: ACTIVE | Noted: 2020-12-11

## 2021-10-26 NOTE — PROGRESS NOTES
Assessment/Plan:    50 y.o.-year-old woman with history of organic fuel exposure in the home; visit was conducted through a [place] .  Her PFTs have been previously noted not to be diagnostic due to poor effort.  Was evaluated by Dr. Marie and initiated on Dupixent and has been doing significantly better since then.  For the present we would recommend;    Continue Breo Ellipta 1 puff daily.  She knows to gargle after using this.    Continue Spiriva.    Continue to use 3 times daily albuterol nebs.    Continue Protonix 40 mg daily.    She is up-to-date with her influenza vaccination.    Continue Dupixent injections.    Has received both doses of her Covid-19 vaccine.    Return to clinic in 6 weeks.    Tamra Scruggsqvi  Pulmonary and Critical Care  [id number]          Subjective:    Patient ID: Ms. Amin is a 49 y.o.female with a past medical history significant for anxiety, arthritis, questionable asthma versus COPD, diabetes, hypertension and a prior history of SVT presents with a follow-up visit for her pulmonary complaints.  She follows with me fairly closely for her moderate-persistent, difficult to control asthma symptoms.  Since her last clinic visit with me, she does look better and is no longer wheezing but continues to have baseline shortness of breath.  She denies fevers, chills, night sweats or change in her weight.  She continues to use Dupixent twice a week and states that at the end of the first week of receiving these infusions her breathing generally worsens.  Again she has no timing for when her symptoms are worse.    Pertinent past medical history:  48-year-old female here for follow-up.  Her breathing is a bit worse than 6 months ago.  She had multiple ER visits and evaluations.  This includes negative PE study.  She had a cath with a 75% LAD lesion which was intervened upon.  Since her catheterization she feels she has more heartburn.  Her breathing is worse with exertion.  Improves with rest.   It is also worse with cold weather.  Warm weather and humid air does not bother.  Symptoms localized to the chest.  Pertinent positives include burning sensation in the chest.  Pertinent negatives include no fever or hemoptysis.    Current Outpatient Medications   Medication Sig Dispense Refill     acetaminophen (TYLENOL) 500 MG tablet Take 2 tablets (1,000 mg) by mouth every 8 hours 100 tablet 0     albuterol (PROAIR HFA/PROVENTIL HFA/VENTOLIN HFA) 108 (90 Base) MCG/ACT inhaler Inhale 2 puffs into the lungs every 4 hours as needed for shortness of breath / dyspnea 4 g 11     albuterol (PROVENTIL) (2.5 MG/3ML) 0.083% neb solution Inhale 2.5 mg into the lungs       amitriptyline (ELAVIL) 10 MG tablet 2 tablets At Bedtime       ASPIRIN LOW DOSE 81 MG EC tablet        atorvastatin (LIPITOR) 80 MG tablet TAKE 1 TABLET (80 MG TOTAL) BY MOUTH AT BEDTIME FOR CHOLESTEROL       clopidogrel (PLAVIX) 75 MG tablet Take 1 tablet (75 mg) by mouth daily 90 tablet 3     colchicine (COLCYRS) 0.6 MG tablet TAKE 1 TABLET (0.6 MG TOTAL) BY MOUTH DAILY       diclofenac (VOLTAREN) 1 % topical gel Apply 2 g topically 4 times daily 50 g 1     Dupilumab (DUPIXENT) 200 MG/1.14ML SOSY Inject 200 mg Subcutaneous every 14 days 1.14 mL 6     ferrous sulfate (FEROSUL) 325 (65 Fe) MG tablet Take 325 mg by mouth       fluticasone-vilanterol (BREO ELLIPTA) 200-25 MCG/INH inhaler Inhale 1 puff into the lungs       gabapentin (NEURONTIN) 300 MG capsule Take 2 capsules (600 mg) by mouth 3 times daily 180 capsule 3     guaiFENesin (ROBITUSSIN) 100 MG/5ML liquid Take 10 mLs (200 mg) by mouth every 4 hours as needed for cough 200 mL 1     hydrocortisone (CORTAID) 1 % external cream Apply topically 2 times daily 60 g 0     insulin aspart (NOVOLOG FLEXPEN) 100 UNIT/ML pen Give before meals : For Pre-Meal Glucose: 140-189 give 1 unit, 190-239 give 2 units, 240-289 give 3 units, 290-339 give 4 units, = or >340 give 5 units. 15 mL 0     insulin glargine  (LANTUS PEN) 100 UNIT/ML pen Inject 14 Units Subcutaneous At Bedtime 15 mL 3     insulin pen needle (32G X 4 MM) 32G X 4 MM miscellaneous Use one pen needles daily or as directed. 200 each 11     ipratropium - albuterol 0.5 mg/2.5 mg/3 mL (DUONEB) 0.5-2.5 (3) MG/3ML neb solution Take 1 vial (3 mLs) by nebulization every 6 hours as needed for shortness of breath / dyspnea or wheezing 240 mL 11     loratadine (CLARITIN) 10 MG tablet Take 10 mg by mouth daily       meclizine (ANTIVERT) 25 MG tablet Take 25 mg by mouth daily as needed        metFORMIN (GLUCOPHAGE-XR) 750 MG 24 hr tablet Take 750 mg by mouth daily (with breakfast)       metoprolol tartrate (LOPRESSOR) 25 MG tablet TAKE 1 TABLET (25 MG TOTAL) BY MOUTH 2 (TWO) TIMES A DAY FOR BLOOD PRESSURE       montelukast (SINGULAIR) 10 MG tablet Take 1 tablet (10 mg) by mouth At Bedtime 30 tablet 6     omeprazole (PRILOSEC) 40 MG DR capsule 1 capsule daily       ondansetron (ZOFRAN) 4 MG tablet Take 1 tablet (4 mg) by mouth every 8 hours as needed for nausea or vomiting 30 tablet 0     polyethylene glycol (MIRALAX) powder Take 17 g by mouth daily 510 g 1     polyvinyl alcohol (ARTIFICIAL TEARS) 1.4 % ophthalmic solution Place 1 drop into both eyes as needed for dry eyes 15 mL 3     predniSONE (DELTASONE) 2.5 MG tablet TAKE 2 TABLETS (5 MG) BY MOUTH DAILY       sucralfate (CARAFATE) 1 GM tablet Take 1 g by mouth       tiotropium (SPIRIVA RESPIMAT) 2.5 MCG/ACT inhalation aerosol Inhale 2 puffs into the lungs daily 4 g 1     B-D U/F 31G X 8 MM insulin pen needle USE ONE PEN NEEDLE DAILY AS NEEDED FOR  each 1     furosemide (LASIX) 20 MG tablet Take 1 tablet (20 mg) by mouth every other day for 8 doses 4 tablet 1     furosemide (LASIX) 20 MG tablet Take 1 tablet (20 mg) by mouth daily for 3 days 3 tablet 0     hydrochlorothiazide (MICROZIDE) 12.5 MG capsule TAKE 1 CAPSULE (12.5 MG TOTAL) BY MOUTH DAILY FOR BLOOD PRESSURE 90 capsule 2       Physical Exam   /74    Pulse 76   Wt 56.5 kg (124 lb 9.6 oz)   SpO2 97%   BMI 21.37 kg/m    Physical Exam  Constitutional:       Appearance: Normal appearance.   HENT:      Head: Normocephalic.      Mouth/Throat:      Mouth: Mucous membranes are moist.   Cardiovascular:      Rate and Rhythm: Normal rate and regular rhythm.      Heart sounds: Normal heart sounds.   Pulmonary:      Effort: Pulmonary effort is normal.      Breath sounds: Normal breath sounds. No wheezing.   Abdominal:      General: Abdomen is flat.   Skin:     General: Skin is warm.   Neurological:      General: No focal deficit present.      Mental Status: She is alert.               Tamra Duong MD  Pulmonary and Critical Care  (p) [phone]

## 2021-10-30 ENCOUNTER — HOSPITAL ENCOUNTER (EMERGENCY)
Facility: HOSPITAL | Age: 51
Discharge: HOME | End: 2021-10-30
Attending: EMERGENCY MEDICINE | Admitting: EMERGENCY MEDICINE
Payer: COMMERCIAL

## 2021-10-30 ENCOUNTER — APPOINTMENT (OUTPATIENT)
Dept: RADIOLOGY | Facility: HOSPITAL | Age: 51
End: 2021-10-30
Attending: EMERGENCY MEDICINE
Payer: COMMERCIAL

## 2021-10-30 VITALS
SYSTOLIC BLOOD PRESSURE: 117 MMHG | BODY MASS INDEX: 20.25 KG/M2 | TEMPERATURE: 98.3 F | HEART RATE: 89 BPM | WEIGHT: 126 LBS | HEIGHT: 66 IN | OXYGEN SATURATION: 99 % | RESPIRATION RATE: 23 BRPM | DIASTOLIC BLOOD PRESSURE: 57 MMHG

## 2021-10-30 DIAGNOSIS — R07.89 ATYPICAL CHEST PAIN: ICD-10-CM

## 2021-10-30 DIAGNOSIS — R07.9 CHEST PAIN, UNSPECIFIED TYPE: ICD-10-CM

## 2021-10-30 LAB
ANION GAP SERPL CALCULATED.3IONS-SCNC: 9 MMOL/L (ref 5–18)
ATRIAL RATE - MUSE: 81 BPM
BASOPHILS # BLD AUTO: 0.1 10E3/UL (ref 0–0.2)
BASOPHILS NFR BLD AUTO: 1 %
BUN SERPL-MCNC: 7 MG/DL (ref 8–22)
CALCIUM SERPL-MCNC: 9.5 MG/DL (ref 8.5–10.5)
CHLORIDE BLD-SCNC: 109 MMOL/L (ref 98–107)
CO2 SERPL-SCNC: 24 MMOL/L (ref 22–31)
CREAT SERPL-MCNC: 0.66 MG/DL (ref 0.6–1.1)
DIASTOLIC BLOOD PRESSURE - MUSE: NORMAL MMHG
EOSINOPHIL # BLD AUTO: 0.7 10E3/UL (ref 0–0.7)
EOSINOPHIL NFR BLD AUTO: 8 %
ERYTHROCYTE [DISTWIDTH] IN BLOOD BY AUTOMATED COUNT: 14.5 % (ref 10–15)
GFR SERPL CREATININE-BSD FRML MDRD: >90 ML/MIN/1.73M2
GLUCOSE BLD-MCNC: 134 MG/DL (ref 70–125)
HCT VFR BLD AUTO: 39.5 % (ref 35–47)
HGB BLD-MCNC: 12.5 G/DL (ref 11.7–15.7)
HOLD SPECIMEN: NORMAL
HOLD SPECIMEN: NORMAL
IMM GRANULOCYTES # BLD: 0 10E3/UL
IMM GRANULOCYTES NFR BLD: 0 %
INTERPRETATION ECG - MUSE: NORMAL
LYMPHOCYTES # BLD AUTO: 3.4 10E3/UL (ref 0.8–5.3)
LYMPHOCYTES NFR BLD AUTO: 37 %
MCH RBC QN AUTO: 26.8 PG (ref 26.5–33)
MCHC RBC AUTO-ENTMCNC: 31.6 G/DL (ref 31.5–36.5)
MCV RBC AUTO: 85 FL (ref 78–100)
MONOCYTES # BLD AUTO: 0.7 10E3/UL (ref 0–1.3)
MONOCYTES NFR BLD AUTO: 8 %
NEUTROPHILS # BLD AUTO: 4.3 10E3/UL (ref 1.6–8.3)
NEUTROPHILS NFR BLD AUTO: 46 %
NRBC # BLD AUTO: 0 10E3/UL
NRBC BLD AUTO-RTO: 0 /100
P AXIS - MUSE: 45 DEGREES
PLATELET # BLD AUTO: 222 10E3/UL (ref 150–450)
POTASSIUM BLD-SCNC: 3.7 MMOL/L (ref 3.5–5)
PR INTERVAL - MUSE: 160 MS
QRS DURATION - MUSE: 90 MS
QT - MUSE: 408 MS
QTC - MUSE: 473 MS
R AXIS - MUSE: -28 DEGREES
RBC # BLD AUTO: 4.67 10E6/UL (ref 3.8–5.2)
SODIUM SERPL-SCNC: 142 MMOL/L (ref 136–145)
SYSTOLIC BLOOD PRESSURE - MUSE: NORMAL MMHG
T AXIS - MUSE: 39 DEGREES
TROPONIN I SERPL-MCNC: <0.01 NG/ML (ref 0–0.29)
TROPONIN I SERPL-MCNC: <0.01 NG/ML (ref 0–0.29)
VENTRICULAR RATE- MUSE: 81 BPM
WBC # BLD AUTO: 9.2 10E3/UL (ref 4–11)

## 2021-10-30 PROCEDURE — 250N000013 HC RX MED GY IP 250 OP 250 PS 637: Performed by: EMERGENCY MEDICINE

## 2021-10-30 PROCEDURE — 80048 BASIC METABOLIC PNL TOTAL CA: CPT | Performed by: EMERGENCY MEDICINE

## 2021-10-30 PROCEDURE — 85025 COMPLETE CBC W/AUTO DIFF WBC: CPT | Performed by: EMERGENCY MEDICINE

## 2021-10-30 PROCEDURE — 84484 ASSAY OF TROPONIN QUANT: CPT | Mod: 91 | Performed by: EMERGENCY MEDICINE

## 2021-10-30 PROCEDURE — 36415 COLL VENOUS BLD VENIPUNCTURE: CPT | Performed by: EMERGENCY MEDICINE

## 2021-10-30 PROCEDURE — 93005 ELECTROCARDIOGRAM TRACING: CPT | Performed by: EMERGENCY MEDICINE

## 2021-10-30 PROCEDURE — 99285 EMERGENCY DEPT VISIT HI MDM: CPT | Mod: 25

## 2021-10-30 PROCEDURE — 71046 X-RAY EXAM CHEST 2 VIEWS: CPT

## 2021-10-30 RX ORDER — ASPIRIN 81 MG/1
324 TABLET, CHEWABLE ORAL ONCE
Status: COMPLETED | OUTPATIENT
Start: 2021-10-30 | End: 2021-10-30

## 2021-10-30 RX ADMIN — ASPIRIN 81 MG CHEWABLE TABLET 324 MG: 81 TABLET CHEWABLE at 06:49

## 2021-10-30 ASSESSMENT — ENCOUNTER SYMPTOMS
VOMITING: 0
FEVER: 0
NAUSEA: 0
ACTIVITY CHANGE: 0
SHORTNESS OF BREATH: 1
COUGH: 1

## 2021-10-30 ASSESSMENT — MIFFLIN-ST. JEOR: SCORE: 1193.28

## 2021-10-30 NOTE — ED PROVIDER NOTES
Emergency Department Encounter     Evaluation Date & Time:   10/30/2021  6:00 AM    CHIEF COMPLAINT:  Chest Pain      Triage Note:Pt here with chest pain that started 3 days ago. Tonight daughter in law states that it is worse and that is why she came in [facility]. Also has some sob.         Impression and Plan     ED COURSE & MEDICAL DECISION MAKING:    Patient with a history of CAD, hypertension, DM2 presents with chest pain.  It is a constant left-sided chest pain that has been present for the last 2 days.  She states that it has been unchanged despite her activity.  She has chronic shortness of breath which is unchanged.  She has had similar episodes of chest pain, last was evaluated 2 months ago by her primary care provider.    In reviewing her records, she was seen 2 months ago in our emergency department.  Given her cardiac stent history it had been recommended that she come to the emergency department by her cardiologist.  At that time she had endorsed 3 days of constant similar chest discomfort.  She states that she has intermittent chest pain like this every week to every month.    07:48 AM I updated the patient and the patient's daughter-in-law on plan for repeat troponin in 3 hours.  If this is unremarkable will recommend referral to rapid access cardiology clinic for repeat evaluation for atypical chest pain in a patient with cardiac risk factors.    Repeat troponin was normal. I updated the patient and the patient's daughter with use of language line. Will plan to refer the patient to follow-up with rapid access cardiology clinic.     At the conclusion of the encounter I discussed the results of all the tests and the disposition. The questions were answered. The patient or family acknowledged understanding and was agreeable with the care plan.    FINAL IMPRESSION:    ICD-10-CM    1. Atypical chest pain  R07.89          MEDICATIONS GIVEN IN THE EMERGENCY DEPARTMENT:  Medications   aspirin (ASA) chewable  tablet 324 mg (324 mg Oral Given 10/30/21 0649)       NEW PRESCRIPTIONS STARTED AT TODAY'S ED VISIT:  New Prescriptions    No medications on file       HPI     HPI     Kulwant Amin is a 51 year old female with a pertinent history of CAD, hypertension, type 2 diabetes resents with chest pain.  Her daughter-in-law is at the bedside and helping with the patient's history.  Patient has had 3 days of intermittent chest pain.  She reports that is substernal, worse with activity.  She has associated shortness of breath.  She currently is having pain.  She denies any nausea, vomiting.  She has had a normal appetite.  She has a chronic cough that is unchanged.  She denies any fever, chills, sweats.    7:48 AM A Swedish  was secured through the language line and I re-discussed the patient's symptoms and presentation with the patient through use of the . The patient clarified that she has had a constant left sided pressure-like pain for the last 2 days. It has been unchanged. Currently rated a 4 out of 10. It does not radiate. She states it is unchanged with activity--including laying down, walking. She has chronic shortness of breath which is unchanged. She was seen two months ago by FP and states she was told it was non-cardiac.     REVIEW OF SYSTEMS:  Review of Systems   Constitutional: Negative for activity change and fever.   HENT: Negative for congestion.    Respiratory: Positive for cough and shortness of breath.    Cardiovascular: Positive for chest pain.   Gastrointestinal: Negative for nausea and vomiting.   All other systems reviewed and are negative.        Medical History     Past Medical History:   Diagnosis Date     Acute asthma exacerbation 1/6/2020     Acute blood loss anemia      Anxiety      Arthritis      Asthma      Asthma exacerbation 11/19/2015     Chest wall pain 12/26/2017     COPD (chronic obstructive pulmonary disease) (H)      Coronary artery disease due to lipid rich plaque  2018     Depression      Diabetes mellitus, type II (H)      Essential hypertension 2017     Generalized headaches      GERD (gastroesophageal reflux disease)      History of anesthesia complications      Lactic acid acidosis 2018     Lower GI bleeding      Nonspecific chest pain 2018     Pneumonia      SVT (supraventricular tachycardia) (H)      TB lung, latent        Past Surgical History:   Procedure Laterality Date     CORONARY STENT PLACEMENT       CV CORONARY ANGIOGRAM N/A 2018    Procedure: Coronary Angiogram;  Surgeon: Angelo Serrano MD;  Location: [facility];  Service:      WY ESOPHAGOGASTRODUODENOSCOPY TRANSORAL DIAGNOSTIC N/A 12/10/2018    Procedure: ESOPHAGOGASTRODUODENOSCOPY (EGD);  Surgeon: Eddie Renteria MD;  Location: [facility];  Service: Gastroenterology     [place]Y ESOPHAGOGASTRODUODENOSCOPY TRANSORAL DIAGNOSTIC N/A 12/3/2020    Procedure: ESOPHAGOGASTRODUODENOSCOPY (EGD) with biospies ;  Surgeon: Avi Crow MD;  Location: [facility];  Service: Gastroenterology     [facility] COLONOSCOPY W/WO BRUSH/WASH N/A 12/10/2018    Procedure: COLONOSCOPY with polypectomy using biopsy forceps;  Surgeon: Eddie Renteria MD;  Location: [facility];  Service: Gastroenterology       Family History   Problem Relation Age of Onset     Other - See Comments Mother          of an intestinal problem     Ulcers Father          of gastritis     Breast Cancer No family hx of        Social History     Tobacco Use     Smoking status: Former Smoker     Packs/day: 1.00     Years: 30.00     Pack years: 30.00     Types: Cigarettes, Cigarettes, Cigarettes     Quit date: 2003     Years since quittin.8     Smokeless tobacco: Never Used     Tobacco comment: No passive exposure   Substance Use Topics     Alcohol use: No     Drug use: No       acetaminophen (TYLENOL) 500 MG tablet  albuterol (PROAIR HFA/PROVENTIL HFA/VENTOLIN HFA) 108 (90 Base) MCG/ACT  "inhaler  albuterol (PROVENTIL) (2.5 MG/3ML) 0.083% neb solution  amitriptyline (ELAVIL) 10 MG tablet  ASPIRIN LOW DOSE 81 MG EC tablet  atorvastatin (LIPITOR) 80 MG tablet  B-D U/F 31G X 8 MM insulin pen needle  clopidogrel (PLAVIX) 75 MG tablet  colchicine (COLCYRS) 0.6 MG tablet  diclofenac (VOLTAREN) 1 % topical gel  Dupilumab (DUPIXENT) 200 MG/1.14ML SOSY  ferrous sulfate (FEROSUL) 325 (65 Fe) MG tablet  fluticasone-vilanterol (BREO ELLIPTA) 200-25 MCG/INH inhaler  furosemide (LASIX) 20 MG tablet  furosemide (LASIX) 20 MG tablet  gabapentin (NEURONTIN) 300 MG capsule  guaiFENesin (ROBITUSSIN) 100 MG/5ML liquid  hydrochlorothiazide (MICROZIDE) 12.5 MG capsule  hydrocortisone (CORTAID) 1 % external cream  insulin aspart (NOVOLOG FLEXPEN) 100 UNIT/ML pen  insulin glargine (LANTUS PEN) 100 UNIT/ML pen  insulin pen needle (32G X 4 MM) 32G X 4 MM miscellaneous  ipratropium - albuterol 0.5 mg/2.5 mg/3 mL (DUONEB) 0.5-2.5 (3) MG/3ML neb solution  loratadine (CLARITIN) 10 MG tablet  meclizine (ANTIVERT) 25 MG tablet  metFORMIN (GLUCOPHAGE-XR) 750 MG 24 hr tablet  metoprolol tartrate (LOPRESSOR) 25 MG tablet  montelukast (SINGULAIR) 10 MG tablet  omeprazole (PRILOSEC) 40 MG DR capsule  ondansetron (ZOFRAN) 4 MG tablet  polyethylene glycol (MIRALAX) powder  polyvinyl alcohol (ARTIFICIAL TEARS) 1.4 % ophthalmic solution  predniSONE (DELTASONE) 2.5 MG tablet  sucralfate (CARAFATE) 1 GM tablet  tiotropium (SPIRIVA RESPIMAT) 2.5 MCG/ACT inhalation aerosol        Physical Exam     First Vitals:  Patient Vitals for the past 24 hrs:   BP Temp Temp src Pulse Resp SpO2 Height Weight   10/30/21 0558 127/81 98.3  F (36.8  C) Temporal 87 16 99 % 1.676 m (5' 6\") 57.2 kg (126 lb)       PHYSICAL EXAM:   Physical Exam  Constitutional:       Appearance: She is well-developed.   HENT:      Head: Normocephalic.   Eyes:      Pupils: Pupils are equal, round, and reactive to light.   Cardiovascular:      Rate and Rhythm: Normal rate and regular " rhythm.      Heart sounds: Normal heart sounds.   Pulmonary:      Effort: Pulmonary effort is normal.      Breath sounds: Normal breath sounds.   Abdominal:      General: Bowel sounds are normal.      Palpations: Abdomen is soft.   Musculoskeletal:         General: Normal range of motion.      Cervical back: Normal range of motion and neck supple.   Skin:     General: Skin is warm and dry.   Neurological:      General: No focal deficit present.      Mental Status: She is alert.         Results     LAB:  All pertinent labs reviewed and interpreted  Labs Ordered and Resulted from Time of ED Arrival to Time of ED Departure   BASIC METABOLIC PANEL - Abnormal       Result Value    Sodium 142      Potassium 3.7      Chloride 109 (*)     Carbon Dioxide (CO2) 24      Anion Gap 9      Urea Nitrogen 7 (*)     Creatinine 0.66      Calcium 9.5      Glucose 134 (*)     GFR Estimate >90     TROPONIN I - Normal    Troponin I <0.01     CBC WITH PLATELETS AND DIFFERENTIAL    WBC Count 9.2      RBC Count 4.67      Hemoglobin 12.5      Hematocrit 39.5      MCV 85      MCH 26.8      MCHC 31.6      RDW 14.5      Platelet Count 222      % Neutrophils 46      % Lymphocytes 37      % Monocytes 8      % Eosinophils 8      % Basophils 1      % Immature Granulocytes 0      NRBCs per 100 WBC 0      Absolute Neutrophils 4.3      Absolute Lymphocytes 3.4      Absolute Monocytes 0.7      Absolute Eosinophils 0.7      Absolute Basophils 0.1      Absolute Immature Granulocytes 0.0      Absolute NRBCs 0.0     TROPONIN I       RADIOLOGY:  XR Chest 2 Views   Final Result   IMPRESSION: The lungs are clear. The heart size and pulmonary vascularity are normal. No pneumothorax or pleural effusion. No acute bony fracture. No free air under the diaphragm.                  ECG:    Performed at: 06:07    Impression: normal sinus rhythm    Rate: 81 bpm  Rhythm: Normal sinus rhythm  TX Interval: 160 ms  QRS Interval: 90 ms  QTc Interval: 473 ms  ST Changes: no  acute ischemic changes  Comparison: sinus rhythm has replaced sinus tachycardia from 8/27/21    I have independently reviewed and interpreted the EKS(s) documented above    [facility] Documentation         Debra Neal MD  Emergency Medicine  [facility] EMERGENCY DEPARTMENT       Debra Neal MD  10/30/21 7649

## 2021-11-04 ENCOUNTER — OFFICE VISIT (OUTPATIENT)
Dept: CARDIOLOGY | Facility: CLINIC | Age: 51
End: 2021-11-04
Attending: EMERGENCY MEDICINE
Payer: COMMERCIAL

## 2021-11-04 VITALS
SYSTOLIC BLOOD PRESSURE: 102 MMHG | RESPIRATION RATE: 16 BRPM | WEIGHT: 127 LBS | DIASTOLIC BLOOD PRESSURE: 70 MMHG | BODY MASS INDEX: 20.5 KG/M2 | HEART RATE: 66 BPM

## 2021-11-04 DIAGNOSIS — I25.83 CORONARY ARTERY DISEASE DUE TO LIPID RICH PLAQUE: ICD-10-CM

## 2021-11-04 DIAGNOSIS — R07.9 CHEST PAIN, UNSPECIFIED TYPE: ICD-10-CM

## 2021-11-04 DIAGNOSIS — I25.10 CORONARY ARTERY DISEASE DUE TO LIPID RICH PLAQUE: ICD-10-CM

## 2021-11-04 DIAGNOSIS — I10 ESSENTIAL HYPERTENSION: ICD-10-CM

## 2021-11-04 DIAGNOSIS — R07.9 NONSPECIFIC CHEST PAIN: Primary | ICD-10-CM

## 2021-11-04 PROCEDURE — 99214 OFFICE O/P EST MOD 30 MIN: CPT | Performed by: INTERNAL MEDICINE

## 2021-11-04 RX ORDER — FLASH GLUCOSE SCANNING READER
EACH MISCELLANEOUS
COMMUNITY
Start: 2021-01-20 | End: 2022-11-30

## 2021-11-04 RX ORDER — CYCLOBENZAPRINE HCL 10 MG
TABLET ORAL
COMMUNITY
Start: 2021-07-17 | End: 2022-02-17

## 2021-11-04 RX ORDER — LANCETS 30 GAUGE
EACH MISCELLANEOUS
COMMUNITY
Start: 2021-06-10 | End: 2022-06-28

## 2021-11-04 RX ORDER — FLASH GLUCOSE SENSOR
KIT MISCELLANEOUS
COMMUNITY
Start: 2021-10-16 | End: 2021-12-14

## 2021-11-04 RX ORDER — ASPIRIN 325 MG
325 TABLET ORAL DAILY
COMMUNITY
Start: 2021-06-01 | End: 2022-02-17

## 2021-11-04 NOTE — PATIENT INSTRUCTIONS
1. We will schedule a sleep apnea evaluation. Please follow through with the testing and treatment recommendations.

## 2021-11-04 NOTE — LETTER
11/4/2021    Adrien Cardoza MD  [address] [place] [address]    RE: Kulwant Amin       Dear Colleague,    I had the pleasure of seeing Kulwant Amin in the [facility].  CARDIOLOGY RAPID ACCESS CLINIC CONSULT NOTE     Assessment/Plan:   1. Dyspnea, atypical chest pain. Unclear etiology with multiple stress test for similar complaints showing no evidence of ischemic coronary disease. Discussed another repeat examination or a search in a different area, such as a sleep apnea evaluation. Patient and daughter are agreeable to further investigation for possible sleep apnea causing her symptoms.  2. Hypertension well-controlled at today's visit  3. Hyperlipidemia on treatment     History of Present Illness:     It is my pleasure to see Kulwant Amin at the [facility] RAPID ACCESS clinic for evaluation of pain.  Last saw Dr. Hirsch in February    Kulwant Amin is a 51 year old female with a past medical history of coronary artery disease, hypertension, hyperlipidemia, diabetes mellitus type 2.  She quit smoking 2 decades ago.  1/2018 she had stent placement to proximal to mid LAD.  Multiple stress test since that time including stress MRI have not shown evidence of inducible ischemia.  She has had recurrent complaints of chest discomfort, suspected at one point of having pericarditis treated with anti-inflammatory treatment.  She returns today after recent emergency room evaluation for chest discomfort, which showed no evidence of myocardial infarction.  Accompanied by her daughter, and a telephone [facility]  assists during the visit.    She has chronic exertional dyspnea and over the last 2 months she has had a persistent burning sensation in her chest. This is also accompanied by tightness in her right thumb. This discomfort is not worsened with activity or sleep. She frequently awakens from her sleep short of breath. She has  been using a hospital bed at home for the last 5 years with the head of the bed elevated. She finds it very difficult to sleep and so is sleepy during the daytime. She does take her medications as directed, including clopidogrel and omeprazole. Her current discomfort is similar to what she has experienced over the last couple of years, and she realizes that multiple stress tests and evaluations have not shown any evidence of obstructive coronary disease.    Past Medical History:     Patient Active Problem List   Diagnosis     Pulmonary nodule, right     Environmental allergies     TB lung, latent     COPD (chronic obstructive pulmonary disease)/Asthma      HTN (hypertension)     Esophageal reflux (GERD)     Bronchiectasis (H)     Hyperthyroidism     Cataracts, bilateral     Corneal opacity     Irregular astigmatism     Chest pain     Anxiety     Asthma     Cerebrovascular accident of right pontine structure (H)     Chronic nonintractable headache, unspecified headache type     Coronary artery disease due to lipid rich plaque     Cough     Dizziness     Elevated troponin     Hyperlipidemia     Microcytic anemia     Migraine headache     Moderate major depression (H)     Moderate persistent asthma     Nonspecific (abnormal) findings on radiological and other examination of other intrathoracic organs     Nonspecific reaction to tuberculin skin test without active tuberculosis     Pneumonia     Type 2 diabetes mellitus treated with insulin (H)     Unspecified visual loss     Chronic obstructive pulmonary disease, unspecified COPD type (H)     Dyspnea     SOB (shortness of breath)     Essential hypertension     GERD (gastroesophageal reflux disease)     Pre-syncope     Latent tuberculosis     Dental caries     Asthma with exacerbation     Nonspecific chest pain     Acculturation difficulty     H/O arterial ischemic stroke       Past Surgical History:     Past Surgical History:   Procedure Laterality Date     CORONARY  STENT PLACEMENT       CV CORONARY ANGIOGRAM N/A 2018    Procedure: Coronary Angiogram;  Surgeon: Angelo Serrano MD;  Location: [facility] Cath Lab;  Service:      OR ESOPHAGOGASTRODUODENOSCOPY TRANSORAL DIAGNOSTIC N/A 12/10/2018    Procedure: ESOPHAGOGASTRODUODENOSCOPY (EGD);  Surgeon: Eddie Renteria MD;  Location: [facility];  Service: Gastroenterology     OR ESOPHAGOGASTRODUODENOSCOPY TRANSORAL DIAGNOSTIC N/A 12/3/2020    Procedure: ESOPHAGOGASTRODUODENOSCOPY (EGD) with biospies ;  Surgeon: Avi Crow MD;  Location: [facility];  Service: Gastroenterology     [facility] COLONOSCOPY W/WO BRUSH/WASH N/A 12/10/2018    Procedure: COLONOSCOPY with polypectomy using biopsy forceps;  Surgeon: Eddie Renteria MD;  Location: [facility];  Service: Gastroenterology       Family History:     Family History   Problem Relation Age of Onset     Other - See Comments Mother          of an intestinal problem     Ulcers Father          of gastritis     Breast Cancer No family hx of      Family history reviewed and is not pertinent to the chief complaint or presenting problem    Social History:    reports that she quit smoking about 18 years ago. Her smoking use included cigarettes, cigarettes, and cigarettes. She has a 30.00 pack-year smoking history. She has never used smokeless tobacco. She reports that she does not drink alcohol and does not use drugs.    Exercise: Walks with a cane    Sleep: Poorly restorative, sleeping with the head of the bed elevated in a hospital bed over the last 5 years.    Meds:     Current Outpatient Medications   Medication Sig Dispense Refill     acetaminophen (TYLENOL) 500 MG tablet Take 2 tablets (1,000 mg) by mouth every 8 hours 100 tablet 0     albuterol (PROAIR HFA/PROVENTIL HFA/VENTOLIN HFA) 108 (90 Base) MCG/ACT inhaler Inhale 2 puffs into the lungs every 4 hours as needed for shortness of breath / dyspnea 4 g 11     albuterol (PROVENTIL) (2.5 MG/3ML) 0.083%  neb solution Inhale 2.5 mg into the lungs       amitriptyline (ELAVIL) 10 MG tablet 2 tablets At Bedtime       aspirin (ASA) 325 MG tablet Take 325 mg by mouth daily       ASPIRIN LOW DOSE 81 MG EC tablet Take 81 mg by mouth daily        atorvastatin (LIPITOR) 80 MG tablet TAKE 1 TABLET (80 MG TOTAL) BY MOUTH AT BEDTIME FOR CHOLESTEROL       B-D U/F 31G X 8 MM insulin pen needle USE ONE PEN NEEDLE DAILY AS NEEDED FOR  each 1     clopidogrel (PLAVIX) 75 MG tablet Take 1 tablet (75 mg) by mouth daily 90 tablet 3     colchicine (COLCYRS) 0.6 MG tablet TAKE 1 TABLET (0.6 MG TOTAL) BY MOUTH DAILY       Continuous Blood Gluc  (FREESTYLE MONICA 14 DAY READER) MICHAEL        Continuous Blood Gluc Sensor (FREESTYLE MNOICA 14 DAY SENSOR) MISC USE 1 UNITS AS DIRECTED EVERY 14 (FOURTEEN) DAYS.       CONTOUR NEXT TEST test strip USE 1 EACH AS DIRECTED 3 (THREE) TIMES A DAY.       cyclobenzaprine (FLEXERIL) 10 MG tablet TAKE 1 TABLET (10 MG TOTAL) BY MOUTH 3 (THREE) TIMES A DAY AS NEEDED FOR MUSCLE SPASMS.       diclofenac (VOLTAREN) 1 % topical gel Apply 2 g topically 4 times daily 50 g 1     Dupilumab (DUPIXENT) 200 MG/1.14ML SOSY Inject 200 mg Subcutaneous every 14 days 1.14 mL 6     ferrous sulfate (FEROSUL) 325 (65 Fe) MG tablet Take 325 mg by mouth       fluticasone-vilanterol (BREO ELLIPTA) 200-25 MCG/INH inhaler Inhale 1 puff into the lungs       furosemide (LASIX) 20 MG tablet Take 1 tablet (20 mg) by mouth daily for 3 days 3 tablet 0     gabapentin (NEURONTIN) 300 MG capsule Take 2 capsules (600 mg) by mouth 3 times daily 180 capsule 3     Global Inject Ease Lancets 30G MISC        guaiFENesin (ROBITUSSIN) 100 MG/5ML liquid Take 10 mLs (200 mg) by mouth every 4 hours as needed for cough 200 mL 1     hydrochlorothiazide (MICROZIDE) 12.5 MG capsule TAKE 1 CAPSULE (12.5 MG TOTAL) BY MOUTH DAILY FOR BLOOD PRESSURE 90 capsule 2     hydrocortisone (CORTAID) 1 % external cream Apply topically 2 times daily 60 g 0      insulin aspart (NOVOLOG FLEXPEN) 100 UNIT/ML pen Give before meals : For Pre-Meal Glucose: 140-189 give 1 unit, 190-239 give 2 units, 240-289 give 3 units, 290-339 give 4 units, = or >340 give 5 units. 15 mL 0     insulin glargine (LANTUS PEN) 100 UNIT/ML pen Inject 14 Units Subcutaneous At Bedtime 15 mL 3     insulin pen needle (32G X 4 MM) 32G X 4 MM miscellaneous Use one pen needles daily or as directed. 200 each 11     ipratropium - albuterol 0.5 mg/2.5 mg/3 mL (DUONEB) 0.5-2.5 (3) MG/3ML neb solution Take 1 vial (3 mLs) by nebulization every 6 hours as needed for shortness of breath / dyspnea or wheezing 240 mL 11     loratadine (CLARITIN) 10 MG tablet Take 10 mg by mouth daily       meclizine (ANTIVERT) 25 MG tablet Take 25 mg by mouth daily as needed        metFORMIN (GLUCOPHAGE-XR) 750 MG 24 hr tablet Take 750 mg by mouth daily (with breakfast)       metoprolol tartrate (LOPRESSOR) 25 MG tablet TAKE 1 TABLET (25 MG TOTAL) BY MOUTH 2 (TWO) TIMES A DAY FOR BLOOD PRESSURE       montelukast (SINGULAIR) 10 MG tablet Take 1 tablet (10 mg) by mouth At Bedtime 30 tablet 6     omeprazole (PRILOSEC) 40 MG DR capsule 1 capsule daily       ondansetron (ZOFRAN) 4 MG tablet Take 1 tablet (4 mg) by mouth every 8 hours as needed for nausea or vomiting 30 tablet 0     polyethylene glycol (MIRALAX) powder Take 17 g by mouth daily 510 g 1     predniSONE (DELTASONE) 2.5 MG tablet TAKE 2 TABLETS (5 MG) BY MOUTH DAILY       sucralfate (CARAFATE) 1 GM tablet Take 1 g by mouth       tiotropium (SPIRIVA RESPIMAT) 2.5 MCG/ACT inhalation aerosol Inhale 2 puffs into the lungs daily 4 g 1     furosemide (LASIX) 20 MG tablet Take 1 tablet (20 mg) by mouth every other day for 8 doses 4 tablet 1     polyvinyl alcohol (ARTIFICIAL TEARS) 1.4 % ophthalmic solution Place 1 drop into both eyes as needed for dry eyes 15 mL 3       Allergies:   Patient has no known allergies.    Review of Systems:        Oh point review of systems otherwise  negative      Objective:      Physical Exam  57.6 kg (127 lb)     Body mass index is 20.5 kg/m .  /70 (BP Location: Right arm, Patient Position: Sitting, Cuff Size: Adult Regular)   Pulse 66   Resp 16   Wt 57.6 kg (127 lb)   BMI 20.50 kg/m          General Appearance : Awake, Alert, No acute distress  HEENT: No Scleral icterus; the mucous membranes were pink and moist.  Conjunctivae not injected  Neck:  No cervical bruits, jugular venous distention, or thyromegaly   Chest: The spine was straight. Chest wall symmetric  Lungs: Respirations unlabored; the lungs are clear to auscultation.  No wheezing   Cardiovascular:   Normal point of maximal impulse.  Auscultation reveals normal first and second heart sounds with no murmurs, rubs, or gallops.  Carotid, radial, and dorsalis pedal pulses are intact and symmetric.    Abdomen: No organomegaly, masses, bruits, or tenderness. Bowels sounds are present  Extremities: No edema  Skin: No xanthelasma. Warm, Dry.  Musculoskeletal: No tenderness.  Neurologic: Alert and oriented ×3. Speech is fluent.      EKG (personally reviewed):  10/30/2021: Normal sinus rhythm at 81 bpm.  Normal ECG.    Cardiac Imaging Studies:  Echocardiogram 1/2021:    When compared to the previous study dated 8/14/2020, no significant change.    Normal left ventricular size, wall thickness and wall motion. Left ventricle ejection fraction is normal. The calculated left ventricular ejection fraction is 57%.    Normal right ventricular size and systolic function.    No obvious valvular disease.    CTA chest PE run 8/2020:  1.  No evidence of pulmonary embolus.   2.  Moderate to large amount of airspace infiltrate involving the inferior half of the right lower lobe, compatible with pneumonia.   3.  Hepatic steatosis.      Cardiac stress MRI 7/2019:  Report unavailable in [facility], but description of report is that it suggests no evidence of ischemia or infarction    Lab Review   Lab Results    Component Value Date     10/30/2021    .3 06/18/2015    CO2 24 10/30/2021    CO2 25.1 06/18/2015    BUN 7 10/30/2021    BUN 14.1 06/18/2015     Lab Results   Component Value Date    WBC 9.2 10/30/2021    WBC 8.8 01/26/2015    HGB 12.5 10/30/2021    HGB 13.7 09/11/2015    HCT 39.5 10/30/2021    HCT 41.8 09/11/2015    MCV 85 10/30/2021    MCV 80.2 09/11/2015     10/30/2021     01/26/2015     Lab Results   Component Value Date    CHOL 105 05/06/2021    CHOL 212.0 02/19/2014    TRIG 103 05/06/2021    TRIG 342.0 02/19/2014    HDL 41 05/06/2021    HDL 50.0 02/19/2014     Lab Results   Component Value Date    TROPONINI <0.01 10/30/2021     Lab Results   Component Value Date    BNP 11 08/27/2021     Lab Results   Component Value Date    TSH 0.76 01/14/2021    TSH 0.63 09/30/2015           This note created using Dragon voice recognition software. Sound alike errors may have escaped editing.     cc:   Debra Neal MD  [address] [place] [address]

## 2021-11-04 NOTE — PROGRESS NOTES
CARDIOLOGY RAPID ACCESS CLINIC CONSULT NOTE     Assessment/Plan:   1. Dyspnea, atypical chest pain. Unclear etiology with multiple stress test for similar complaints showing no evidence of ischemic coronary disease. Discussed another repeat examination or a search in a different area, such as a sleep apnea evaluation. Patient and daughter are agreeable to further investigation for possible sleep apnea causing her symptoms.  2. Hypertension well-controlled at today's visit  3. Hyperlipidemia on treatment     History of Present Illness:     It is my pleasure to see Kulwant Amin at the [facility] RAPID ACCESS clinic for evaluation of pain.  Last saw Dr. Hirsch in February    Kulwant Amin is a 51 year old female with a past medical history of coronary artery disease, hypertension, hyperlipidemia, diabetes mellitus type 2.  She quit smoking 2 decades ago.  1/2018 she had stent placement to proximal to mid LAD.  Multiple stress test since that time including stress MRI have not shown evidence of inducible ischemia.  She has had recurrent complaints of chest discomfort, suspected at one point of having pericarditis treated with anti-inflammatory treatment.  She returns today after recent emergency room evaluation for chest discomfort, which showed no evidence of myocardial infarction.  Accompanied by her daughter, and a telephone Counts include 234 beds at the [facility]  assists during the visit.    She has chronic exertional dyspnea and over the last 2 months she has had a persistent burning sensation in her chest. This is also accompanied by tightness in her right thumb. This discomfort is not worsened with activity or sleep. She frequently awakens from her sleep short of breath. She has been using a hospital bed at home for the last 5 years with the head of the bed elevated. She finds it very difficult to sleep and so is sleepy during the daytime. She does take her medications as directed, including clopidogrel and  omeprazole. Her current discomfort is similar to what she has experienced over the last couple of years, and she realizes that multiple stress tests and evaluations have not shown any evidence of obstructive coronary disease.    Past Medical History:     Patient Active Problem List   Diagnosis     Pulmonary nodule, right     Environmental allergies     TB lung, latent     COPD (chronic obstructive pulmonary disease)/Asthma      HTN (hypertension)     Esophageal reflux (GERD)     Bronchiectasis (H)     Hyperthyroidism     Cataracts, bilateral     Corneal opacity     Irregular astigmatism     Chest pain     Anxiety     Asthma     Cerebrovascular accident of right pontine structure (H)     Chronic nonintractable headache, unspecified headache type     Coronary artery disease due to lipid rich plaque     Cough     Dizziness     Elevated troponin     Hyperlipidemia     Microcytic anemia     Migraine headache     Moderate major depression (H)     Moderate persistent asthma     Nonspecific (abnormal) findings on radiological and other examination of other intrathoracic organs     Nonspecific reaction to tuberculin skin test without active tuberculosis     Pneumonia     Type 2 diabetes mellitus treated with insulin (H)     Unspecified visual loss     Chronic obstructive pulmonary disease, unspecified COPD type (H)     Dyspnea     SOB (shortness of breath)     Essential hypertension     GERD (gastroesophageal reflux disease)     Pre-syncope     Latent tuberculosis     Dental caries     Asthma with exacerbation     Nonspecific chest pain     Acculturation difficulty     H/O arterial ischemic stroke       Past Surgical History:     Past Surgical History:   Procedure Laterality Date     CORONARY STENT PLACEMENT  2018     CV CORONARY ANGIOGRAM N/A 1/25/2018    Procedure: Coronary Angiogram;  Surgeon: Angelo Serrano MD;  Location: [facility] Cath Lab;  Service:      LA ESOPHAGOGASTRODUODENOSCOPY TRANSORAL DIAGNOSTIC N/A  12/10/2018    Procedure: ESOPHAGOGASTRODUODENOSCOPY (EGD);  Surgeon: Eddie Renteria MD;  Location: [facility];  Service: Gastroenterology     MT ESOPHAGOGASTRODUODENOSCOPY TRANSORAL DIAGNOSTIC N/A 12/3/2020    Procedure: ESOPHAGOGASTRODUODENOSCOPY (EGD) with biospies ;  Surgeon: Avi Crow MD;  Location: [facility];  Service: Gastroenterology     [facility] COLONOSCOPY W/WO BRUSH/WASH N/A 12/10/2018    Procedure: COLONOSCOPY with polypectomy using biopsy forceps;  Surgeon: Eddie Renteria MD;  Location: [facility];  Service: Gastroenterology       Family History:     Family History   Problem Relation Age of Onset     Other - See Comments Mother          of an intestinal problem     Ulcers Father          of gastritis     Breast Cancer No family hx of      Family history reviewed and is not pertinent to the chief complaint or presenting problem    Social History:    reports that she quit smoking about 18 years ago. Her smoking use included cigarettes, cigarettes, and cigarettes. She has a 30.00 pack-year smoking history. She has never used smokeless tobacco. She reports that she does not drink alcohol and does not use drugs.    Exercise: Walks with a cane    Sleep: Poorly restorative, sleeping with the head of the bed elevated in a hospital bed over the last 5 years.    Meds:     Current Outpatient Medications   Medication Sig Dispense Refill     acetaminophen (TYLENOL) 500 MG tablet Take 2 tablets (1,000 mg) by mouth every 8 hours 100 tablet 0     albuterol (PROAIR HFA/PROVENTIL HFA/VENTOLIN HFA) 108 (90 Base) MCG/ACT inhaler Inhale 2 puffs into the lungs every 4 hours as needed for shortness of breath / dyspnea 4 g 11     albuterol (PROVENTIL) (2.5 MG/3ML) 0.083% neb solution Inhale 2.5 mg into the lungs       amitriptyline (ELAVIL) 10 MG tablet 2 tablets At Bedtime       aspirin (ASA) 325 MG tablet Take 325 mg by mouth daily       ASPIRIN LOW DOSE 81 MG EC tablet Take 81 mg by mouth daily         atorvastatin (LIPITOR) 80 MG tablet TAKE 1 TABLET (80 MG TOTAL) BY MOUTH AT BEDTIME FOR CHOLESTEROL       B-D U/F 31G X 8 MM insulin pen needle USE ONE PEN NEEDLE DAILY AS NEEDED FOR  each 1     clopidogrel (PLAVIX) 75 MG tablet Take 1 tablet (75 mg) by mouth daily 90 tablet 3     colchicine (COLCYRS) 0.6 MG tablet TAKE 1 TABLET (0.6 MG TOTAL) BY MOUTH DAILY       Continuous Blood Gluc  (FREESTYLE MONICA 14 DAY READER) MICHAEL        Continuous Blood Gluc Sensor (FREESTYLE MONICA 14 DAY SENSOR) [facility] USE 1 UNITS AS DIRECTED EVERY 14 (FOURTEEN) DAYS.       CONTOUR NEXT TEST test strip USE 1 EACH AS DIRECTED 3 (THREE) TIMES A DAY.       cyclobenzaprine (FLEXERIL) 10 MG tablet TAKE 1 TABLET (10 MG TOTAL) BY MOUTH 3 (THREE) TIMES A DAY AS NEEDED FOR MUSCLE SPASMS.       diclofenac (VOLTAREN) 1 % topical gel Apply 2 g topically 4 times daily 50 g 1     Dupilumab (DUPIXENT) 200 MG/1.14ML SOSY Inject 200 mg Subcutaneous every 14 days 1.14 mL 6     ferrous sulfate (FEROSUL) 325 (65 Fe) MG tablet Take 325 mg by mouth       fluticasone-vilanterol (BREO ELLIPTA) 200-25 MCG/INH inhaler Inhale 1 puff into the lungs       furosemide (LASIX) 20 MG tablet Take 1 tablet (20 mg) by mouth daily for 3 days 3 tablet 0     gabapentin (NEURONTIN) 300 MG capsule Take 2 capsules (600 mg) by mouth 3 times daily 180 capsule 3     Global Inject Ease Lancets 30G MISC        guaiFENesin (ROBITUSSIN) 100 MG/5ML liquid Take 10 mLs (200 mg) by mouth every 4 hours as needed for cough 200 mL 1     hydrochlorothiazide (MICROZIDE) 12.5 MG capsule TAKE 1 CAPSULE (12.5 MG TOTAL) BY MOUTH DAILY FOR BLOOD PRESSURE 90 capsule 2     hydrocortisone (CORTAID) 1 % external cream Apply topically 2 times daily 60 g 0     insulin aspart (NOVOLOG FLEXPEN) 100 UNIT/ML pen Give before meals : For Pre-Meal Glucose: 140-189 give 1 unit, 190-239 give 2 units, 240-289 give 3 units, 290-339 give 4 units, = or >340 give 5 units. 15 mL 0     insulin glargine  (LANTUS PEN) 100 UNIT/ML pen Inject 14 Units Subcutaneous At Bedtime 15 mL 3     insulin pen needle (32G X 4 MM) 32G X 4 MM miscellaneous Use one pen needles daily or as directed. 200 each 11     ipratropium - albuterol 0.5 mg/2.5 mg/3 mL (DUONEB) 0.5-2.5 (3) MG/3ML neb solution Take 1 vial (3 mLs) by nebulization every 6 hours as needed for shortness of breath / dyspnea or wheezing 240 mL 11     loratadine (CLARITIN) 10 MG tablet Take 10 mg by mouth daily       meclizine (ANTIVERT) 25 MG tablet Take 25 mg by mouth daily as needed        metFORMIN (GLUCOPHAGE-XR) 750 MG 24 hr tablet Take 750 mg by mouth daily (with breakfast)       metoprolol tartrate (LOPRESSOR) 25 MG tablet TAKE 1 TABLET (25 MG TOTAL) BY MOUTH 2 (TWO) TIMES A DAY FOR BLOOD PRESSURE       montelukast (SINGULAIR) 10 MG tablet Take 1 tablet (10 mg) by mouth At Bedtime 30 tablet 6     omeprazole (PRILOSEC) 40 MG DR capsule 1 capsule daily       ondansetron (ZOFRAN) 4 MG tablet Take 1 tablet (4 mg) by mouth every 8 hours as needed for nausea or vomiting 30 tablet 0     polyethylene glycol (MIRALAX) powder Take 17 g by mouth daily 510 g 1     predniSONE (DELTASONE) 2.5 MG tablet TAKE 2 TABLETS (5 MG) BY MOUTH DAILY       sucralfate (CARAFATE) 1 GM tablet Take 1 g by mouth       tiotropium (SPIRIVA RESPIMAT) 2.5 MCG/ACT inhalation aerosol Inhale 2 puffs into the lungs daily 4 g 1     furosemide (LASIX) 20 MG tablet Take 1 tablet (20 mg) by mouth every other day for 8 doses 4 tablet 1     polyvinyl alcohol (ARTIFICIAL TEARS) 1.4 % ophthalmic solution Place 1 drop into both eyes as needed for dry eyes 15 mL 3       Allergies:   Patient has no known allergies.    Review of Systems:        Oh point review of systems otherwise negative      Objective:      Physical Exam  57.6 kg (127 lb)     Body mass index is 20.5 kg/m .  /70 (BP Location: Right arm, Patient Position: Sitting, Cuff Size: Adult Regular)   Pulse 66   Resp 16   Wt 57.6 kg (127 lb)    BMI 20.50 kg/m          General Appearance : Awake, Alert, No acute distress  HEENT: No Scleral icterus; the mucous membranes were pink and moist.  Conjunctivae not injected  Neck:  No cervical bruits, jugular venous distention, or thyromegaly   Chest: The spine was straight. Chest wall symmetric  Lungs: Respirations unlabored; the lungs are clear to auscultation.  No wheezing   Cardiovascular:   Normal point of maximal impulse.  Auscultation reveals normal first and second heart sounds with no murmurs, rubs, or gallops.  Carotid, radial, and dorsalis pedal pulses are intact and symmetric.    Abdomen: No organomegaly, masses, bruits, or tenderness. Bowels sounds are present  Extremities: No edema  Skin: No xanthelasma. Warm, Dry.  Musculoskeletal: No tenderness.  Neurologic: Alert and oriented ×3. Speech is fluent.      EKG (personally reviewed):  10/30/2021: Normal sinus rhythm at 81 bpm.  Normal ECG.    Cardiac Imaging Studies:  Echocardiogram 1/2021:    When compared to the previous study dated 8/14/2020, no significant change.    Normal left ventricular size, wall thickness and wall motion. Left ventricle ejection fraction is normal. The calculated left ventricular ejection fraction is 57%.    Normal right ventricular size and systolic function.    No obvious valvular disease.    CTA chest PE run 8/2020:  1.  No evidence of pulmonary embolus.   2.  Moderate to large amount of airspace infiltrate involving the inferior half of the right lower lobe, compatible with pneumonia.   3.  Hepatic steatosis.      Cardiac stress MRI 7/2019:  Report unavailable in [facility], but description of report is that it suggests no evidence of ischemia or infarction    Lab Review   Lab Results   Component Value Date     10/30/2021    .3 06/18/2015    CO2 24 10/30/2021    CO2 25.1 06/18/2015    BUN 7 10/30/2021    BUN 14.1 06/18/2015     Lab Results   Component Value Date    WBC 9.2 10/30/2021    WBC 8.8 01/26/2015     HGB 12.5 10/30/2021    HGB 13.7 09/11/2015    HCT 39.5 10/30/2021    HCT 41.8 09/11/2015    MCV 85 10/30/2021    MCV 80.2 09/11/2015     10/30/2021     01/26/2015     Lab Results   Component Value Date    CHOL 105 05/06/2021    CHOL 212.0 02/19/2014    TRIG 103 05/06/2021    TRIG 342.0 02/19/2014    HDL 41 05/06/2021    HDL 50.0 02/19/2014     Lab Results   Component Value Date    TROPONINI <0.01 10/30/2021     Lab Results   Component Value Date    BNP 11 08/27/2021     Lab Results   Component Value Date    TSH 0.76 01/14/2021    TSH 0.63 09/30/2015       Ja Kramer MD, MD [facility]      This note created using Dragon voice recognition software. Sound alike errors may have escaped editing.

## 2021-11-09 ENCOUNTER — OFFICE VISIT (OUTPATIENT)
Dept: FAMILY MEDICINE | Facility: CLINIC | Age: 51
End: 2021-11-09
Payer: COMMERCIAL

## 2021-11-09 VITALS
DIASTOLIC BLOOD PRESSURE: 66 MMHG | WEIGHT: 126.5 LBS | OXYGEN SATURATION: 96 % | TEMPERATURE: 98.1 F | RESPIRATION RATE: 16 BRPM | SYSTOLIC BLOOD PRESSURE: 102 MMHG | HEIGHT: 66 IN | BODY MASS INDEX: 20.33 KG/M2 | HEART RATE: 80 BPM

## 2021-11-09 DIAGNOSIS — M79.644 PAIN OF FINGER OF RIGHT HAND: ICD-10-CM

## 2021-11-09 DIAGNOSIS — E11.9 TYPE 2 DIABETES MELLITUS TREATED WITH INSULIN (H): ICD-10-CM

## 2021-11-09 DIAGNOSIS — Z86.73 H/O ARTERIAL ISCHEMIC STROKE: ICD-10-CM

## 2021-11-09 DIAGNOSIS — J47.9 BRONCHIECTASIS WITHOUT COMPLICATION (H): ICD-10-CM

## 2021-11-09 DIAGNOSIS — Z79.4 TYPE 2 DIABETES MELLITUS TREATED WITH INSULIN (H): ICD-10-CM

## 2021-11-09 DIAGNOSIS — F33.9 RECURRENT MAJOR DEPRESSIVE DISORDER, REMISSION STATUS UNSPECIFIED (H): ICD-10-CM

## 2021-11-09 DIAGNOSIS — R07.89 ATYPICAL CHEST PAIN: Primary | ICD-10-CM

## 2021-11-09 DIAGNOSIS — J44.9 CHRONIC OBSTRUCTIVE PULMONARY DISEASE, UNSPECIFIED COPD TYPE (H): ICD-10-CM

## 2021-11-09 PROCEDURE — 99214 OFFICE O/P EST MOD 30 MIN: CPT | Performed by: FAMILY MEDICINE

## 2021-11-09 RX ORDER — METFORMIN HCL 500 MG
500 TABLET, EXTENDED RELEASE 24 HR ORAL
Qty: 90 TABLET | Refills: 1 | Status: SHIPPED | OUTPATIENT
Start: 2021-11-09 | End: 2022-05-04

## 2021-11-09 ASSESSMENT — MIFFLIN-ST. JEOR: SCORE: 1195.55

## 2021-11-09 NOTE — PROGRESS NOTES
ASSESMENT AND PLAN:  Atypical chest pain  The cause ruled out.  Saw cardiology on 11/4/2021 after ED visit.  Referred to sleep medicine.  Pending appointment.   Advised to call sleep medicine to make an appointment.  Contact number given to daughter-in-law.    Type 2 diabetes mellitus treated with insulin (H)  Normal creatinine.  Restart Metformin at lower dose 500 mg.  Was on 750 mg.  She will continue sliding scale insulin if needed  - metFORMIN (GLUCOPHAGE-XR) 500 MG 24 hr tablet; Take 1 tablet (500 mg) by mouth daily (with dinner)    Recurrent major depressive disorder, remission status unspecified (H)  No suicidal homicidal ideations.    H/O arterial ischemic stroke  Residual weakness.    Bronchiectasis without complication (H)  Managed by pulmonology.    Chronic obstructive pulmonary disease, unspecified COPD type (H)  Managed by pulmonology.    Pain of finger of right hand  - Orthopedic  Referral; Future    This transcription uses voice recognition software, which may contain typographical errors.      SUBJECTIVE: Kulwant Amin is a 51-year-old female with multiple chronic medical conditions here for ED follow-up.  She was seen in the ED on 10/30/2021 due to atypical chest pain.  Serial troponin was negative.  CBC, BMP were unremarkable.  EKG showed no acute changes.  She was sent home from the ED.  She was referred to cardiology, rapid access clinic.  She was seen by cardiology on 11/4/2021, was referred to sleep medicine to rule out other causes of chest pain.  No appointment scheduled yet.  She is still having chest pain.  But states is more like burning sensation on her chest.  She has omeprazole and sucralfate for heartburn symptoms.  She has history of COPD, severe asthma.  This is managed by pulmonology.  She was last seen by pulmonology on 10/7/2021, follow-up appointment scheduled on 11/25/2021.  No acute respiratory symptoms today.  She has been having right thumb pain for about 2 months.   She is unable to bend her thumb.  The pain radiate up to her forearm now.  She wants to see orthopedics.  Daughter-in-law states her blood sugar runs in the 110s to 130s.  She is using sliding scale insulin as needed.  No insulin use in the past few weeks.  Last A1c was 9.2 in 8/21.  She was on Metformin in the past but was discontinued due to normal A1c few months ago.  No low blood sugar with hypoglycemic symptoms.      Past Medical History:   Diagnosis Date     Acute asthma exacerbation 1/6/2020     Acute blood loss anemia      Anxiety      Arthritis      Asthma      Asthma exacerbation 11/19/2015     Chest wall pain 12/26/2017    Added automatically from request for surgery [id number]     COPD (chronic obstructive pulmonary disease) (H)      Coronary artery disease due to lipid rich plaque 1/25/2018     Depression      Diabetes mellitus, type II (H)      Essential hypertension 4/14/2017     Generalized headaches      GERD (gastroesophageal reflux disease)      History of anesthesia complications     nausea and vomiting     Lactic acid acidosis 11/23/2018     Lower GI bleeding      Nonspecific chest pain 12/07/2018    2 negative stress tests since coronary stenting in 2018; in June 2020 she had 1 week of this pain with normal cardiac enzymes and relief with Toradol     Pneumonia      SVT (supraventricular tachycardia) (H)      TB lung, latent     9 mos INH     Patient Active Problem List   Diagnosis     Pulmonary nodule, right     Environmental allergies     TB lung, latent     COPD (chronic obstructive pulmonary disease)/Asthma      HTN (hypertension)     Esophageal reflux (GERD)     Hyperthyroidism     Cataracts, bilateral     Corneal opacity     Irregular astigmatism     Chest pain     Anxiety     Asthma     Chronic nonintractable headache, unspecified headache type     Coronary artery disease due to lipid rich plaque     Cough     Dizziness     Elevated troponin     Hyperlipidemia     Microcytic anemia      "Migraine headache     Moderate major depression (H)     Moderate persistent asthma     Nonspecific (abnormal) findings on radiological and other examination of other intrathoracic organs     Nonspecific reaction to tuberculin skin test without active tuberculosis     Pneumonia     Type 2 diabetes mellitus treated with insulin (H)     Unspecified visual loss     Chronic obstructive pulmonary disease, unspecified COPD type (H)     Dyspnea     SOB (shortness of breath)     Essential hypertension     GERD (gastroesophageal reflux disease)     Pre-syncope     Latent tuberculosis     Dental caries     Asthma with exacerbation     Nonspecific chest pain     Acculturation difficulty     H/O arterial ischemic stroke       Allergies:  No Known Allergies    History   Smoking Status     Former Smoker     Packs/day: 1.00     Years: 30.00     Types: Cigarettes, Cigarettes, Cigarettes     Quit date: 1/1/2003   Smokeless Tobacco     Never Used     Comment: No passive exposure       Review of systems otherwise negative except as listed in HPI.   History   Smoking Status     Former Smoker     Packs/day: 1.00     Years: 30.00     Types: Cigarettes, Cigarettes, Cigarettes     Quit date: 1/1/2003   Smokeless Tobacco     Never Used     Comment: No passive exposure       OBJECTICE: /66 (BP Location: Left arm, Patient Position: Sitting, Cuff Size: Adult Regular)   Pulse 80   Temp 98.1  F (36.7  C) (Oral)   Resp 16   Ht 1.676 m (5' 6\")   Wt 57.4 kg (126 lb 8 oz)   SpO2 96%   BMI 20.42 kg/m      DATA REVIEWED:  Additional History from Old Records Summarized (2):   Radiology Tests Summarized or Ordered (1):   Labs Reviewed or Ordered (1):       GEN-alert,  in no apparent distress.  HEENT- neck is supple.  CV-regular rate and rhythm with no murmur.   RESP-lungs-no wheezing today.  ABDOMEN- Soft , not tender.  EXTREM- No edema. Right thumb-unable to flex.  No joint swelling, erythema or tenderness.  SKIN-normal        Adrien Cardoza MD "   11/9/2021

## 2021-11-12 ENCOUNTER — TELEPHONE (OUTPATIENT)
Dept: PULMONOLOGY | Facility: OTHER | Age: 51
End: 2021-11-12
Payer: COMMERCIAL

## 2021-11-12 DIAGNOSIS — J45.50 SEVERE PERSISTENT ASTHMA WITHOUT COMPLICATION (H): Primary | ICD-10-CM

## 2021-11-12 RX ORDER — PREDNISONE 20 MG/1
40 TABLET ORAL DAILY
Qty: 10 TABLET | Refills: 0 | Status: SHIPPED | OUTPATIENT
Start: 2021-11-12 | End: 2021-11-17

## 2021-11-12 NOTE — TELEPHONE ENCOUNTER
Call from daughter stating she is having an increased cough and some wheezing. Will give prednisone 40 mg x 5 days per Dr. Girard and instructed to call with an update next week.

## 2021-11-23 ENCOUNTER — OFFICE VISIT (OUTPATIENT)
Dept: PULMONOLOGY | Facility: OTHER | Age: 51
End: 2021-11-23
Payer: COMMERCIAL

## 2021-11-23 VITALS
DIASTOLIC BLOOD PRESSURE: 80 MMHG | HEART RATE: 78 BPM | WEIGHT: 128.8 LBS | OXYGEN SATURATION: 100 % | SYSTOLIC BLOOD PRESSURE: 118 MMHG | BODY MASS INDEX: 20.79 KG/M2

## 2021-11-23 DIAGNOSIS — J44.9 OBSTRUCTIVE LUNG DISEASE (H): Primary | ICD-10-CM

## 2021-11-23 DIAGNOSIS — J45.51 SEVERE PERSISTENT ASTHMA WITH ACUTE EXACERBATION (H): ICD-10-CM

## 2021-11-23 PROCEDURE — 99214 OFFICE O/P EST MOD 30 MIN: CPT | Performed by: INTERNAL MEDICINE

## 2021-11-23 RX ORDER — FUROSEMIDE 20 MG
20 TABLET ORAL DAILY
Qty: 10 TABLET | Refills: 1 | Status: SHIPPED | OUTPATIENT
Start: 2021-11-23 | End: 2022-05-04

## 2021-11-23 RX ORDER — ONDANSETRON 4 MG/1
4 TABLET, FILM COATED ORAL EVERY 8 HOURS PRN
Qty: 30 TABLET | Refills: 1 | Status: SHIPPED | OUTPATIENT
Start: 2021-11-23 | End: 2022-05-04

## 2021-11-23 ASSESSMENT — ASTHMA QUESTIONNAIRES
ACT_TOTALSCORE: 5
HOSPITALIZATION_OVERNIGHT_LAST_YEAR_TOTAL: THREE OR MORE
QUESTION_5 LAST FOUR WEEKS HOW WOULD YOU RATE YOUR ASTHMA CONTROL: NOT CONTROLLED AT ALL
QUESTION_3 LAST FOUR WEEKS HOW OFTEN DID YOUR ASTHMA SYMPTOMS (WHEEZING, COUGHING, SHORTNESS OF BREATH, CHEST TIGHTNESS OR PAIN) WAKE YOU UP AT NIGHT OR EARLIER THAN USUAL IN THE MORNING: FOUR OR MORE NIGHTS A WEEK
QUESTION_4 LAST FOUR WEEKS HOW OFTEN HAVE YOU USED YOUR RESCUE INHALER OR NEBULIZER MEDICATION (SUCH AS ALBUTEROL): THREE OR MORE TIMES PER DAY
EMERGENCY_ROOM_LAST_YEAR_TOTAL: THREE OR MORE
QUESTION_1 LAST FOUR WEEKS HOW MUCH OF THE TIME DID YOUR ASTHMA KEEP YOU FROM GETTING AS MUCH DONE AT WORK, SCHOOL OR AT HOME: ALL OF THE TIME
QUESTION_2 LAST FOUR WEEKS HOW OFTEN HAVE YOU HAD SHORTNESS OF BREATH: MORE THAN ONCE A DAY

## 2021-11-23 NOTE — PROGRESS NOTES
Assessment/Plan:    50 y.o.-year-old woman with history of organic fuel exposure in the home; visit was conducted through a [place] .  Her PFTs have been previously noted not to be diagnostic due to poor effort.  Was evaluated by Dr. Marie and initiated on Dupixent and has been doing significantly better since then.  For the present we would recommend;    Continue Breo Ellipta 1 puff daily.  She knows to gargle after using this.    Continue Spiriva.    Continue to use 3 times daily albuterol nebs.    Continue Protonix 40 mg daily.    She is up-to-date with her influenza vaccination.    Continue Dupixent injections.    Has received both doses of her Covid-19 vaccine.    I have initiated her on 20 mg a day of Lasix for 10 days to see if this alleviates her symptoms.  I have also advised that she eats bananas while she is on Lasix (recognizing that this causes her blood sugars to go up.  (    Return to clinic in 6 weeks.    Tamra Duong  Pulmonary and Critical Care  [id number]          Subjective:    Patient ID: Ms. Amin is a 49 y.o.female with a past medical history significant for anxiety, arthritis, questionable asthma versus COPD, diabetes, hypertension and a prior history of SVT presents with a follow-up visit for her pulmonary complaints.  She follows with me fairly closely for her moderate-persistent, difficult to control asthma symptoms.  Since her last clinic visit with me, she once again has had significant worsening of her cough and received another course of prednisone through our clinic which helped a little but not as much.  She wakes up at night with cough episodes and also with fevers.      Pertinent past medical history:  48-year-old female here for follow-up.  Her breathing is a bit worse than 6 months ago.  She had multiple ER visits and evaluations.  This includes negative PE study.  She had a cath with a 75% LAD lesion which was intervened upon.  Since her catheterization she feels she has  more heartburn.  Her breathing is worse with exertion.  Improves with rest.  It is also worse with cold weather.  Warm weather and humid air does not bother.  Symptoms localized to the chest.  Pertinent positives include burning sensation in the chest.  Pertinent negatives include no fever or hemoptysis.    Current Outpatient Medications   Medication Sig Dispense Refill     acetaminophen (TYLENOL) 500 MG tablet Take 2 tablets (1,000 mg) by mouth every 8 hours 100 tablet 0     albuterol (PROAIR HFA/PROVENTIL HFA/VENTOLIN HFA) 108 (90 Base) MCG/ACT inhaler Inhale 2 puffs into the lungs every 4 hours as needed for shortness of breath / dyspnea 4 g 11     albuterol (PROVENTIL) (2.5 MG/3ML) 0.083% neb solution Inhale 2.5 mg into the lungs       amitriptyline (ELAVIL) 10 MG tablet 2 tablets At Bedtime       aspirin (ASA) 325 MG tablet Take 325 mg by mouth daily       ASPIRIN LOW DOSE 81 MG EC tablet Take 81 mg by mouth daily        atorvastatin (LIPITOR) 80 MG tablet TAKE 1 TABLET (80 MG TOTAL) BY MOUTH AT BEDTIME FOR CHOLESTEROL       B-D U/F 31G X 8 MM insulin pen needle USE ONE PEN NEEDLE DAILY AS NEEDED FOR  each 1     clopidogrel (PLAVIX) 75 MG tablet Take 1 tablet (75 mg) by mouth daily 90 tablet 3     colchicine (COLCYRS) 0.6 MG tablet TAKE 1 TABLET (0.6 MG TOTAL) BY MOUTH DAILY       Continuous Blood Gluc  (FREESTYLE MONICA 14 DAY READER) MICHAEL        Continuous Blood Gluc Sensor (FREESTYLE MONICA 14 DAY SENSOR) MISC USE 1 UNITS AS DIRECTED EVERY 14 (FOURTEEN) DAYS.       CONTOUR NEXT TEST test strip USE 1 EACH AS DIRECTED 3 (THREE) TIMES A DAY.       cyclobenzaprine (FLEXERIL) 10 MG tablet TAKE 1 TABLET (10 MG TOTAL) BY MOUTH 3 (THREE) TIMES A DAY AS NEEDED FOR MUSCLE SPASMS.       diclofenac (VOLTAREN) 1 % topical gel Apply 2 g topically 4 times daily 50 g 1     Dupilumab (DUPIXENT) 200 MG/1.14ML SOSY Inject 200 mg Subcutaneous every 14 days 1.14 mL 6     ferrous sulfate (FEROSUL) 325 (65 Fe) MG  tablet Take 325 mg by mouth       fluticasone-vilanterol (BREO ELLIPTA) 200-25 MCG/INH inhaler Inhale 1 puff into the lungs       furosemide (LASIX) 20 MG tablet Take 1 tablet (20 mg) by mouth daily for 10 days 10 tablet 1     gabapentin (NEURONTIN) 300 MG capsule Take 2 capsules (600 mg) by mouth 3 times daily 180 capsule 3     Global Inject Ease Lancets 30G MISC        guaiFENesin (ROBITUSSIN) 100 MG/5ML liquid Take 10 mLs (200 mg) by mouth every 4 hours as needed for cough 200 mL 1     hydrochlorothiazide (MICROZIDE) 12.5 MG capsule TAKE 1 CAPSULE (12.5 MG TOTAL) BY MOUTH DAILY FOR BLOOD PRESSURE 90 capsule 2     hydrocortisone (CORTAID) 1 % external cream Apply topically 2 times daily 60 g 0     insulin aspart (NOVOLOG FLEXPEN) 100 UNIT/ML pen Give before meals : For Pre-Meal Glucose: 140-189 give 1 unit, 190-239 give 2 units, 240-289 give 3 units, 290-339 give 4 units, = or >340 give 5 units. 15 mL 0     insulin glargine (LANTUS PEN) 100 UNIT/ML pen Inject 14 Units Subcutaneous At Bedtime 15 mL 3     insulin pen needle (32G X 4 MM) 32G X 4 MM miscellaneous Use one pen needles daily or as directed. 200 each 11     ipratropium - albuterol 0.5 mg/2.5 mg/3 mL (DUONEB) 0.5-2.5 (3) MG/3ML neb solution Take 1 vial (3 mLs) by nebulization every 6 hours as needed for shortness of breath / dyspnea or wheezing 240 mL 11     loratadine (CLARITIN) 10 MG tablet Take 10 mg by mouth daily       meclizine (ANTIVERT) 25 MG tablet Take 25 mg by mouth daily as needed        metFORMIN (GLUCOPHAGE-XR) 500 MG 24 hr tablet Take 1 tablet (500 mg) by mouth daily (with dinner) 90 tablet 1     metoprolol tartrate (LOPRESSOR) 25 MG tablet TAKE 1 TABLET (25 MG TOTAL) BY MOUTH 2 (TWO) TIMES A DAY FOR BLOOD PRESSURE       montelukast (SINGULAIR) 10 MG tablet Take 1 tablet (10 mg) by mouth At Bedtime 30 tablet 6     omeprazole (PRILOSEC) 40 MG DR capsule 1 capsule daily       ondansetron (ZOFRAN) 4 MG tablet Take 1 tablet (4 mg) by mouth every  8 hours as needed for nausea 30 tablet 1     ondansetron (ZOFRAN) 4 MG tablet Take 1 tablet (4 mg) by mouth every 8 hours as needed for nausea or vomiting 30 tablet 0     polyethylene glycol (MIRALAX) powder Take 17 g by mouth daily 510 g 1     polyvinyl alcohol (ARTIFICIAL TEARS) 1.4 % ophthalmic solution Place 1 drop into both eyes as needed for dry eyes 15 mL 3     predniSONE (DELTASONE) 2.5 MG tablet TAKE 2 TABLETS (5 MG) BY MOUTH DAILY       sucralfate (CARAFATE) 1 GM tablet Take 1 g by mouth       tiotropium (SPIRIVA RESPIMAT) 2.5 MCG/ACT inhalation aerosol Inhale 2 puffs into the lungs daily 4 g 1     furosemide (LASIX) 20 MG tablet Take 1 tablet (20 mg) by mouth every other day for 8 doses 4 tablet 1     furosemide (LASIX) 20 MG tablet Take 1 tablet (20 mg) by mouth daily for 3 days 3 tablet 0       Physical Exam   /80   Pulse 78   Wt 58.4 kg (128 lb 12.8 oz)   SpO2 100%   BMI 20.79 kg/m    Physical Exam  Constitutional:       Appearance: Normal appearance.   HENT:      Head: Normocephalic.      Mouth/Throat:      Mouth: Mucous membranes are moist.   Cardiovascular:      Rate and Rhythm: Normal rate and regular rhythm.      Heart sounds: Normal heart sounds.   Pulmonary:      Effort: Pulmonary effort is normal.      Breath sounds: Normal breath sounds. No wheezing.   Abdominal:      General: Abdomen is flat.   Skin:     General: Skin is warm.   Neurological:      General: No focal deficit present.      Mental Status: She is alert.               Tamra Duong MD  Pulmonary and Critical Care  (p) [phone]

## 2021-11-23 NOTE — PATIENT INSTRUCTIONS
1. Please take Furosemide 20mg daily for 10 days.  2. Be sure to eat a banana every day while you are on Lasix.  3. Please continue your current inhalers.

## 2021-11-24 ASSESSMENT — ASTHMA QUESTIONNAIRES: ACT_TOTALSCORE: 5

## 2021-11-30 ENCOUNTER — OFFICE VISIT (OUTPATIENT)
Dept: FAMILY MEDICINE | Facility: CLINIC | Age: 51
End: 2021-11-30
Payer: COMMERCIAL

## 2021-11-30 VITALS
HEIGHT: 66 IN | WEIGHT: 129.19 LBS | TEMPERATURE: 98.2 F | HEART RATE: 87 BPM | DIASTOLIC BLOOD PRESSURE: 76 MMHG | BODY MASS INDEX: 20.76 KG/M2 | SYSTOLIC BLOOD PRESSURE: 114 MMHG | OXYGEN SATURATION: 97 % | RESPIRATION RATE: 16 BRPM

## 2021-11-30 DIAGNOSIS — Z79.4 TYPE 2 DIABETES MELLITUS TREATED WITH INSULIN (H): Primary | ICD-10-CM

## 2021-11-30 DIAGNOSIS — J44.9 CHRONIC OBSTRUCTIVE PULMONARY DISEASE, UNSPECIFIED COPD TYPE (H): ICD-10-CM

## 2021-11-30 DIAGNOSIS — E11.9 TYPE 2 DIABETES MELLITUS TREATED WITH INSULIN (H): Primary | ICD-10-CM

## 2021-11-30 DIAGNOSIS — Z22.7 TB LUNG, LATENT: ICD-10-CM

## 2021-11-30 DIAGNOSIS — D64.9 ANEMIA, UNSPECIFIED TYPE: ICD-10-CM

## 2021-11-30 DIAGNOSIS — K21.9 GASTROESOPHAGEAL REFLUX DISEASE WITHOUT ESOPHAGITIS: ICD-10-CM

## 2021-11-30 DIAGNOSIS — F33.9 RECURRENT MAJOR DEPRESSIVE DISORDER, REMISSION STATUS UNSPECIFIED (H): ICD-10-CM

## 2021-11-30 PROBLEM — Z95.5 S/P CORONARY ARTERY STENT PLACEMENT: Status: ACTIVE | Noted: 2018-02-02

## 2021-11-30 LAB
CHOLEST SERPL-MCNC: 142 MG/DL
FASTING STATUS PATIENT QL REPORTED: ABNORMAL
HBA1C MFR BLD: 8 % (ref 0–5.6)
HDLC SERPL-MCNC: 58 MG/DL
HGB BLD-MCNC: 12.6 G/DL (ref 11.7–15.7)
HOLD SPECIMEN: NORMAL
LDLC SERPL CALC-MCNC: 48 MG/DL
TRIGL SERPL-MCNC: 180 MG/DL

## 2021-11-30 PROCEDURE — 80061 LIPID PANEL: CPT | Performed by: FAMILY MEDICINE

## 2021-11-30 PROCEDURE — 85018 HEMOGLOBIN: CPT | Performed by: FAMILY MEDICINE

## 2021-11-30 PROCEDURE — 83036 HEMOGLOBIN GLYCOSYLATED A1C: CPT | Performed by: FAMILY MEDICINE

## 2021-11-30 PROCEDURE — 36415 COLL VENOUS BLD VENIPUNCTURE: CPT | Performed by: FAMILY MEDICINE

## 2021-11-30 PROCEDURE — 99207 PR FOOT EXAM NO CHARGE: CPT | Performed by: FAMILY MEDICINE

## 2021-11-30 PROCEDURE — 99214 OFFICE O/P EST MOD 30 MIN: CPT | Performed by: FAMILY MEDICINE

## 2021-11-30 RX ORDER — FERROUS SULFATE 325(65) MG
325 TABLET ORAL
Status: CANCELLED | OUTPATIENT
Start: 2021-11-30

## 2021-11-30 RX ORDER — OMEPRAZOLE 40 MG/1
40 CAPSULE, DELAYED RELEASE ORAL DAILY
Qty: 90 CAPSULE | Refills: 3 | Status: SHIPPED | OUTPATIENT
Start: 2021-11-30 | End: 2022-12-11

## 2021-11-30 ASSESSMENT — MIFFLIN-ST. JEOR: SCORE: 1207.74

## 2021-11-30 NOTE — PROGRESS NOTES
ASSESMENT AND PLAN:  Type 2 diabetes mellitus treated with insulin (H)  - Hemoglobin A1c; Future  - TX FOOT EXAM NO CHARGE  - Extra Tube  - Extra Tube; Future  - Hemoglobin A1c  - Extra Tube  - Lipid panel  - insulin glargine (LANTUS PEN) 100 UNIT/ML pen; Inject 18 Units Subcutaneous At Bedtime  Increase Lantus to 18 units and follow-up in 6 weeks.    Anemia, unspecified type  Last  3 hemoglobins were normal.  We will discontinue ferrous sulfate if hemoglobin is normal today.  - Hemoglobin    Gastroesophageal reflux disease without esophagitis  - omeprazole (PRILOSEC) 40 MG DR capsule; Take 1 capsule (40 mg) by mouth daily    TB lung, latent  Completed treatment for few years ago.    Chronic obstructive pulmonary disease, unspecified COPD type (H)  Managed by pulmonology.    Recurrent major depressive disorder, remission status unspecified (H)  Stable.    This transcription uses voice recognition software, which may contain typographical errors.        SUBJECTIVE: Kulwant Amin is a 51-year-old female with history of type 2 diabetes on insulin, COPD, depression, asthma and others here for follow-up.  She sees multiple specialties including cardiology, pulmonology and future appointment scheduled with sleep medicine for possible sleep apnea.  She saw pulmonology last week, note reviewed.  She was given Lasix for 10 days and follow-up appointment scheduled in a few weeks.  No worsening respiratory symptoms since last visit.    She is using Lantus 14 units daily and Metformin  mg daily for diabetes.  Her fasting blood sugar ranges from 140s, 150s to 200s.  No low blood sugar with hypoglycemic symptoms.  Last A1c was 9.2 three months ago.    She is requesting a refill for omeprazole and ferrous sulfate.  Her last hemoglobin was normal in October 2021.  No active bleeding.  No blood in the stool.  No recurrent nosebleeding or gum bleeding.    Depression symptoms are stable.  No suicidal or homicidal  ideations.      Past Medical History:   Diagnosis Date     Acute asthma exacerbation 1/6/2020     Anxiety      Arthritis      Asthma      Asthma exacerbation 11/19/2015     COPD (chronic obstructive pulmonary disease) (H)      Coronary artery disease due to lipid rich plaque 1/25/2018     Depression      Diabetes mellitus, type II (H)      Essential hypertension 4/14/2017     GERD (gastroesophageal reflux disease)      Lactic acid acidosis 11/23/2018     Lower GI bleeding      Nonspecific chest pain 12/07/2018    2 negative stress tests since coronary stenting in 2018; in June 2020 she had 1 week of this pain with normal cardiac enzymes and relief with Toradol     Pneumonia      TB lung, latent     9 mos INH     Patient Active Problem List   Diagnosis     Pulmonary nodule, right     Environmental allergies     TB lung, latent     COPD (chronic obstructive pulmonary disease)/Asthma      HTN (hypertension)     Esophageal reflux (GERD)     Hyperthyroidism     Cataracts, bilateral     Corneal opacity     Irregular astigmatism     Anxiety     Chronic nonintractable headache, unspecified headache type     Coronary artery disease due to lipid rich plaque     Dizziness     Hyperlipidemia     Microcytic anemia     Migraine headache     Moderate major depression (H)     Moderate persistent asthma     Nonspecific (abnormal) findings on radiological and other examination of other intrathoracic organs     Nonspecific reaction to tuberculin skin test without active tuberculosis     Pneumonia     Type 2 diabetes mellitus treated with insulin (H)     Unspecified visual loss     Chronic obstructive pulmonary disease, unspecified COPD type (H)     Dyspnea     SOB (shortness of breath)     Essential hypertension     GERD (gastroesophageal reflux disease)     Latent tuberculosis     Dental caries     S/P coronary artery stent placement     H/O arterial ischemic stroke       Allergies:  No Known Allergies    History   Smoking Status      "Former Smoker     Packs/day: 1.00     Years: 30.00     Types: Cigarettes, Cigarettes, Cigarettes     Quit date: 1/1/2003   Smokeless Tobacco     Never Used     Comment: No passive exposure       Review of systems otherwise negative except as listed in HPI.   History   Smoking Status     Former Smoker     Packs/day: 1.00     Years: 30.00     Types: Cigarettes, Cigarettes, Cigarettes     Quit date: 1/1/2003   Smokeless Tobacco     Never Used     Comment: No passive exposure       OBJECTICE: /76 (BP Location: Left arm, Patient Position: Sitting, Cuff Size: Adult Regular)   Pulse 87   Temp 98.2  F (36.8  C) (Oral)   Resp 16   Ht 1.676 m (5' 6\")   Wt 58.6 kg (129 lb 3 oz)   SpO2 97%   BMI 20.85 kg/m      DATA REVIEWED:  Additional History from Old Records Summarized (2):   Labs Reviewed or Ordered (1):       GEN-alert,  in no apparent distress.  HEENT-mucous membranes are moist, neck is supple.  CV-regular rate and rhythm with no murmur.   RESP-lungs clear to auscultation .  ABDOMEN- Soft , not tender.  EXTREM- No edema.  SKIN-normal        Adrien Cardoza MD   11/30/2021   "

## 2021-12-01 ASSESSMENT — PATIENT HEALTH QUESTIONNAIRE - PHQ9: SUM OF ALL RESPONSES TO PHQ QUESTIONS 1-9: 9

## 2021-12-06 DIAGNOSIS — J45.50 SEVERE PERSISTENT ASTHMA (H): Primary | ICD-10-CM

## 2021-12-10 DIAGNOSIS — Z76.0 ENCOUNTER FOR MEDICATION REFILL: Primary | ICD-10-CM

## 2021-12-13 RX ORDER — FERROUS SULFATE 325(65) MG
TABLET ORAL
Qty: 90 TABLET | Refills: 3 | OUTPATIENT
Start: 2021-12-13

## 2021-12-14 ENCOUNTER — MEDICAL CORRESPONDENCE (OUTPATIENT)
Dept: HEALTH INFORMATION MANAGEMENT | Facility: CLINIC | Age: 51
End: 2021-12-14
Payer: COMMERCIAL

## 2021-12-14 DIAGNOSIS — Z76.0 ENCOUNTER FOR MEDICATION REFILL: Primary | ICD-10-CM

## 2021-12-14 RX ORDER — FLASH GLUCOSE SENSOR
KIT MISCELLANEOUS
Qty: 2 EACH | Refills: 3 | Status: SHIPPED | OUTPATIENT
Start: 2021-12-14 | End: 2022-11-30

## 2021-12-15 ENCOUNTER — HOSPITAL ENCOUNTER (EMERGENCY)
Facility: HOSPITAL | Age: 51
Discharge: HOME | End: 2021-12-15
Attending: EMERGENCY MEDICINE | Admitting: EMERGENCY MEDICINE
Payer: COMMERCIAL

## 2021-12-15 ENCOUNTER — APPOINTMENT (OUTPATIENT)
Dept: CT IMAGING | Facility: HOSPITAL | Age: 51
End: 2021-12-15
Attending: EMERGENCY MEDICINE
Payer: COMMERCIAL

## 2021-12-15 ENCOUNTER — APPOINTMENT (OUTPATIENT)
Dept: RADIOLOGY | Facility: HOSPITAL | Age: 51
End: 2021-12-15
Attending: EMERGENCY MEDICINE
Payer: COMMERCIAL

## 2021-12-15 VITALS
OXYGEN SATURATION: 97 % | HEART RATE: 93 BPM | WEIGHT: 129 LBS | DIASTOLIC BLOOD PRESSURE: 86 MMHG | TEMPERATURE: 98.3 F | BODY MASS INDEX: 20.82 KG/M2 | SYSTOLIC BLOOD PRESSURE: 144 MMHG | RESPIRATION RATE: 20 BRPM

## 2021-12-15 DIAGNOSIS — Z20.822 SUSPECTED COVID-19 VIRUS INFECTION: ICD-10-CM

## 2021-12-15 DIAGNOSIS — U07.1 PNEUMONIA DUE TO 2019 NOVEL CORONAVIRUS: ICD-10-CM

## 2021-12-15 DIAGNOSIS — J12.82 PNEUMONIA DUE TO 2019 NOVEL CORONAVIRUS: ICD-10-CM

## 2021-12-15 DIAGNOSIS — M16.10 ARTHRITIS OF HIP: ICD-10-CM

## 2021-12-15 PROBLEM — R10.13 EPIGASTRIC PAIN: Status: ACTIVE | Noted: 2021-12-15

## 2021-12-15 PROBLEM — R12 HEARTBURN: Status: ACTIVE | Noted: 2021-12-15

## 2021-12-15 LAB
ALBUMIN SERPL-MCNC: 4 G/DL (ref 3.5–5)
ALP SERPL-CCNC: 124 U/L (ref 45–120)
ALT SERPL W P-5'-P-CCNC: 34 U/L (ref 0–45)
ANION GAP SERPL CALCULATED.3IONS-SCNC: 10 MMOL/L (ref 5–18)
AST SERPL W P-5'-P-CCNC: 30 U/L (ref 0–40)
BASOPHILS # BLD AUTO: 0 10E3/UL (ref 0–0.2)
BASOPHILS NFR BLD AUTO: 1 %
BILIRUB SERPL-MCNC: 0.5 MG/DL (ref 0–1)
BNP SERPL-MCNC: 15 PG/ML (ref 0–78)
BUN SERPL-MCNC: 6 MG/DL (ref 8–22)
CALCIUM SERPL-MCNC: 10 MG/DL (ref 8.5–10.5)
CHLORIDE BLD-SCNC: 103 MMOL/L (ref 98–107)
CO2 SERPL-SCNC: 29 MMOL/L (ref 22–31)
CREAT SERPL-MCNC: 0.69 MG/DL (ref 0.6–1.1)
D DIMER PPP FEU-MCNC: 0.54 UG/ML FEU (ref 0–0.5)
EOSINOPHIL # BLD AUTO: 0.6 10E3/UL (ref 0–0.7)
EOSINOPHIL NFR BLD AUTO: 12 %
ERYTHROCYTE [DISTWIDTH] IN BLOOD BY AUTOMATED COUNT: 14.7 % (ref 10–15)
GFR SERPL CREATININE-BSD FRML MDRD: >90 ML/MIN/1.73M2
GLUCOSE BLD-MCNC: 126 MG/DL (ref 70–125)
HCT VFR BLD AUTO: 44.3 % (ref 35–47)
HGB BLD-MCNC: 13.6 G/DL (ref 11.7–15.7)
IMM GRANULOCYTES # BLD: 0 10E3/UL
IMM GRANULOCYTES NFR BLD: 1 %
LYMPHOCYTES # BLD AUTO: 2 10E3/UL (ref 0.8–5.3)
LYMPHOCYTES NFR BLD AUTO: 37 %
MCH RBC QN AUTO: 26.4 PG (ref 26.5–33)
MCHC RBC AUTO-ENTMCNC: 30.7 G/DL (ref 31.5–36.5)
MCV RBC AUTO: 86 FL (ref 78–100)
MONOCYTES # BLD AUTO: 0.4 10E3/UL (ref 0–1.3)
MONOCYTES NFR BLD AUTO: 7 %
NEUTROPHILS # BLD AUTO: 2.3 10E3/UL (ref 1.6–8.3)
NEUTROPHILS NFR BLD AUTO: 42 %
NRBC # BLD AUTO: 0 10E3/UL
NRBC BLD AUTO-RTO: 0 /100
PLATELET # BLD AUTO: 219 10E3/UL (ref 150–450)
POTASSIUM BLD-SCNC: 3.8 MMOL/L (ref 3.5–5)
PROT SERPL-MCNC: 7.7 G/DL (ref 6–8)
RBC # BLD AUTO: 5.16 10E6/UL (ref 3.8–5.2)
SODIUM SERPL-SCNC: 142 MMOL/L (ref 136–145)
TROPONIN I SERPL-MCNC: 0.02 NG/ML (ref 0–0.29)
WBC # BLD AUTO: 5.3 10E3/UL (ref 4–11)

## 2021-12-15 PROCEDURE — 84484 ASSAY OF TROPONIN QUANT: CPT | Performed by: EMERGENCY MEDICINE

## 2021-12-15 PROCEDURE — 85379 FIBRIN DEGRADATION QUANT: CPT | Performed by: EMERGENCY MEDICINE

## 2021-12-15 PROCEDURE — 93005 ELECTROCARDIOGRAM TRACING: CPT | Performed by: EMERGENCY MEDICINE

## 2021-12-15 PROCEDURE — 73502 X-RAY EXAM HIP UNI 2-3 VIEWS: CPT

## 2021-12-15 PROCEDURE — 83880 ASSAY OF NATRIURETIC PEPTIDE: CPT | Performed by: EMERGENCY MEDICINE

## 2021-12-15 PROCEDURE — 71275 CT ANGIOGRAPHY CHEST: CPT

## 2021-12-15 PROCEDURE — 36415 COLL VENOUS BLD VENIPUNCTURE: CPT | Performed by: EMERGENCY MEDICINE

## 2021-12-15 PROCEDURE — 80053 COMPREHEN METABOLIC PANEL: CPT | Performed by: EMERGENCY MEDICINE

## 2021-12-15 PROCEDURE — 71046 X-RAY EXAM CHEST 2 VIEWS: CPT

## 2021-12-15 PROCEDURE — 99285 EMERGENCY DEPT VISIT HI MDM: CPT | Mod: 25

## 2021-12-15 PROCEDURE — 85025 COMPLETE CBC W/AUTO DIFF WBC: CPT | Performed by: EMERGENCY MEDICINE

## 2021-12-15 PROCEDURE — 250N000011 HC RX IP 250 OP 636: Performed by: EMERGENCY MEDICINE

## 2021-12-15 RX ORDER — IOPAMIDOL 755 MG/ML
100 INJECTION, SOLUTION INTRAVASCULAR ONCE
Status: COMPLETED | OUTPATIENT
Start: 2021-12-15 | End: 2021-12-15

## 2021-12-15 RX ADMIN — IOPAMIDOL 100 ML: 755 INJECTION, SOLUTION INTRAVENOUS at 16:50

## 2021-12-15 ASSESSMENT — ENCOUNTER SYMPTOMS
ADENOPATHY: 0
TROUBLE SWALLOWING: 0
ABDOMINAL PAIN: 0
ARTHRALGIAS: 1
CONFUSION: 0
SHORTNESS OF BREATH: 1
HEADACHES: 0
FEVER: 0

## 2021-12-15 NOTE — ED PROVIDER NOTES
EMERGENCY DEPARTMENT ENCOUNTER      NAME: Kulwant Amin  AGE: 51 year old female  YOB: 1970  MRN: [id number]  EVALUATION DATE & TIME: No admission date for patient encounter.    PCP: Adrien Cardoza        Chief Complaint   Patient presents with     Shortness of Breath     Chest Pain         FINAL IMPRESSION:  1. Pneumonia due to 2019 novel coronavirus    2. Arthritis of hip    3. Suspected COVID-19 virus infection          ED COURSE & MEDICAL DECISION MAKING:    Pertinent Labs & Imaging studies reviewed. (See chart for details)  51 year old female presents to the Emergency Department for evaluation of shortness of breath, chest pain.    10:30 AM I met with the patient and performed my initial exam.  I discussed the plan for care and the patient is agreeable with this plan. PPE: Provider wore gloves, N95 mask, eye protection.    5:11 PM I rechecked and updated the patient. I discussed the plan for discharge with the patient, and patient is agreeable. We discussed supportivecares at home and reasons for return to the ER including new or worsening symptoms - all questions and concerns addressed. Patient to be discharged by RN.     Hip pain to suggest arthritis.  No evidence of fracture or septic joint.  Elevated D-dimer therefore CTA PE ordered.  CT shows mild opacities suggestive of COVID-19.  Not hypoxic.  Will send home with pulse ox and monoclonal antibody information.    Recommend follow-up orthopedics regarding hip pain       At the conclusion of the encounter I discussed the results of all of the tests and the disposition. The questions were answered. The patient or family acknowledged understanding and was agreeable with the care plan.     MEDICATIONS GIVEN IN THE EMERGENCY:  Medications   iopamidol (ISOVUE-370) solution 100 mL (100 mLs Intravenous Given 12/15/21 1650)       NEW PRESCRIPTIONS STARTED AT TODAY'S ER VISIT  New Prescriptions    No medications on file        =================================================================    HPI    Patient information was obtained from: the patient     Use of Intrepreter: N/A    Kulwant Amin is a 51 year old female with a pertinent history of pulmonary nodule, COPD, hypertension, GERD, anxiety,CAD, anemia, asthma, dyspnea, coronary artery stent placement, stroke who presents to this ED by personal vehicle for evaluation of chest pain, shortness of breath.    Of note, the patient is COVID-19 positive and is fully vaccinated with the Moderna Vaccine as of 9/2/21.     The patient presents to the ED reporting chronic right hip pain without trauma.  The patient is also COVID-19 positive and reports worsening shortness of breath over the past few day.  Tested +2 days ago at [facility]\"" clinic    The patient denies any other symptoms at this time.     MHx: History of diabetes, heart disease, COPD.       REVIEW OF SYSTEMS   Review of Systems   Constitutional: Negative for fever.   HENT: Negative for trouble swallowing.    Respiratory: Positive for shortness of breath (worsening).    Cardiovascular: Negative for chest pain.   Gastrointestinal: Negative for abdominal pain.   Musculoskeletal: Positive for arthralgias (right hip - chronic).   Skin: Negative for rash.   Allergic/Immunologic: Negative for immunocompromised state.   Neurological: Negative for headaches.   Hematological: Negative for adenopathy.   Psychiatric/Behavioral: Negative for confusion.          PAST MEDICAL HISTORY:  Past Medical History:   Diagnosis Date     Acute asthma exacerbation 1/6/2020     Anxiety      Arthritis      Asthma      Asthma exacerbation 11/19/2015     COPD (chronic obstructive pulmonary disease) (H)      Coronary artery disease due to lipid rich plaque 1/25/2018     Depression      Diabetes mellitus, type II (H)      Essential hypertension 4/14/2017     GERD (gastroesophageal reflux disease)      Lactic acid acidosis 11/23/2018     Lower GI bleeding       Nonspecific chest pain 12/07/2018    2 negative stress tests since coronary stenting in 2018; in June 2020 she had 1 week of this pain with normal cardiac enzymes and relief with Toradol     Pneumonia      TB lung, latent     9 mos INH       PAST SURGICAL HISTORY:  Past Surgical History:   Procedure Laterality Date     CORONARY STENT PLACEMENT  2018     CV CORONARY ANGIOGRAM N/A 1/25/2018    Procedure: Coronary Angiogram;  Surgeon: Angelo Serrano MD;  Location: [facility];  Service:      MS ESOPHAGOGASTRODUODENOSCOPY TRANSORAL DIAGNOSTIC N/A 12/10/2018    Procedure: ESOPHAGOGASTRODUODENOSCOPY (EGD);  Surgeon: Eddie Renteria MD;  Location: [facility];  Service: Gastroenterology     MS ESOPHAGOGASTRODUODENOSCOPY TRANSORAL DIAGNOSTIC N/A 12/3/2020    Procedure: ESOPHAGOGASTRODUODENOSCOPY (EGD) with biospies ;  Surgeon: Avi Crow MD;  Location: [facility];  Service: Gastroenterology     [facility] COLONOSCOPY W/WO BRUSH/WASH N/A 12/10/2018    Procedure: COLONOSCOPY with polypectomy using biopsy forceps;  Surgeon: Eddie Renteria MD;  Location: [facility];  Service: Gastroenterology           CURRENT MEDICATIONS:    Patient's Medications   New Prescriptions    No medications on file   Previous Medications    ACETAMINOPHEN (TYLENOL) 500 MG TABLET    Take 2 tablets (1,000 mg) by mouth every 8 hours    ALBUTEROL (PROAIR HFA/PROVENTIL HFA/VENTOLIN HFA) 108 (90 BASE) MCG/ACT INHALER    Inhale 2 puffs into the lungs every 4 hours as needed for shortness of breath / dyspnea    ALBUTEROL (PROVENTIL) (2.5 MG/3ML) 0.083% NEB SOLUTION    Inhale 2.5 mg into the lungs    AMITRIPTYLINE (ELAVIL) 10 MG TABLET    2 tablets At Bedtime    ASPIRIN (ASA) 325 MG TABLET    Take 325 mg by mouth daily    ASPIRIN LOW DOSE 81 MG EC TABLET    Take 81 mg by mouth daily     ATORVASTATIN (LIPITOR) 80 MG TABLET    TAKE 1 TABLET (80 MG TOTAL) BY MOUTH AT BEDTIME FOR CHOLESTEROL    B-D U/F 31G X 8 MM INSULIN PEN NEEDLE     USE ONE PEN NEEDLE DAILY AS NEEDED FOR SSI    CLOPIDOGREL (PLAVIX) 75 MG TABLET    Take 1 tablet (75 mg) by mouth daily    COLCHICINE (COLCYRS) 0.6 MG TABLET    TAKE 1 TABLET (0.6 MG TOTAL) BY MOUTH DAILY    CONTINUOUS BLOOD GLUC  (FREESTYLE MONICA 14 DAY READER) MICHAEL        CONTINUOUS BLOOD GLUC SENSOR (FREESTYLE MONICA 14 DAY SENSOR) [facility]    USE 1 UNITS AS DIRECTED EVERY 14 (FOURTEEN) DAYS.    CONTOUR NEXT TEST TEST STRIP    USE 1 EACH AS DIRECTED 3 (THREE) TIMES A DAY.    CYCLOBENZAPRINE (FLEXERIL) 10 MG TABLET    TAKE 1 TABLET (10 MG TOTAL) BY MOUTH 3 (THREE) TIMES A DAY AS NEEDED FOR MUSCLE SPASMS.    DICLOFENAC (VOLTAREN) 1 % TOPICAL GEL    Apply 2 g topically 4 times daily    DUPILUMAB (DUPIXENT) 200 MG/1.14ML SOSY    Inject 200 mg Subcutaneous every 14 days    FERROUS SULFATE (FEROSUL) 325 (65 FE) MG TABLET    Take 325 mg by mouth    FLUTICASONE-VILANTEROL (BREO ELLIPTA) 200-25 MCG/INH INHALER    Inhale 1 puff into the lungs daily    FUROSEMIDE (LASIX) 20 MG TABLET    Take 1 tablet (20 mg) by mouth daily for 10 days    GABAPENTIN (NEURONTIN) 300 MG CAPSULE    Take 2 capsules (600 mg) by mouth 3 times daily    GLOBAL INJECT EASE LANCETS 30G MISC        GUAIFENESIN (ROBITUSSIN) 100 MG/5ML LIQUID    Take 10 mLs (200 mg) by mouth every 4 hours as needed for cough    HYDROCHLOROTHIAZIDE (MICROZIDE) 12.5 MG CAPSULE    TAKE 1 CAPSULE (12.5 MG TOTAL) BY MOUTH DAILY FOR BLOOD PRESSURE    HYDROCORTISONE (CORTAID) 1 % EXTERNAL CREAM    Apply topically 2 times daily    INSULIN ASPART (NOVOLOG FLEXPEN) 100 UNIT/ML PEN    Give before meals : For Pre-Meal Glucose: 140-189 give 1 unit, 190-239 give 2 units, 240-289 give 3 units, 290-339 give 4 units, = or >340 give 5 units.    INSULIN GLARGINE (LANTUS PEN) 100 UNIT/ML PEN    Inject 18 Units Subcutaneous At Bedtime    INSULIN PEN NEEDLE (32G X 4 MM) 32G X 4 MM MISCELLANEOUS    Use one pen needles daily or as directed.    IPRATROPIUM - ALBUTEROL 0.5 MG/2.5 MG/3 ML  (DUONEB) 0.5-2.5 (3) MG/3ML NEB SOLUTION    Take 1 vial (3 mLs) by nebulization every 6 hours as needed for shortness of breath / dyspnea or wheezing    LORATADINE (CLARITIN) 10 MG TABLET    Take 10 mg by mouth daily    MECLIZINE (ANTIVERT) 25 MG TABLET    Take 25 mg by mouth daily as needed     METFORMIN (GLUCOPHAGE-XR) 500 MG 24 HR TABLET    Take 1 tablet (500 mg) by mouth daily (with dinner)    METOPROLOL TARTRATE (LOPRESSOR) 25 MG TABLET    TAKE 1 TABLET (25 MG TOTAL) BY MOUTH 2 (TWO) TIMES A DAY FOR BLOOD PRESSURE    MONTELUKAST (SINGULAIR) 10 MG TABLET    Take 1 tablet (10 mg) by mouth At Bedtime    OMEPRAZOLE (PRILOSEC) 40 MG DR CAPSULE    Take 1 capsule (40 mg) by mouth daily    ONDANSETRON (ZOFRAN) 4 MG TABLET    Take 1 tablet (4 mg) by mouth every 8 hours as needed for nausea    POLYETHYLENE GLYCOL (MIRALAX) POWDER    Take 17 g by mouth daily    POLYVINYL ALCOHOL (ARTIFICIAL TEARS) 1.4 % OPHTHALMIC SOLUTION    Place 1 drop into both eyes as needed for dry eyes    PREDNISONE (DELTASONE) 2.5 MG TABLET    TAKE 2 TABLETS (5 MG) BY MOUTH DAILY    SUCRALFATE (CARAFATE) 1 GM TABLET    Take 1 g by mouth    TIOTROPIUM (SPIRIVA RESPIMAT) 2.5 MCG/ACT INHALATION AEROSOL    Inhale 2 puffs into the lungs daily   Modified Medications    No medications on file   Discontinued Medications    No medications on file       ALLERGIES:  No Known Allergies    FAMILY HISTORY:  Family History   Problem Relation Age of Onset     Other - See Comments Mother          of an intestinal problem     Ulcers Father          of gastritis     Breast Cancer No family hx of        SOCIAL HISTORY:   Social History     Socioeconomic History     Marital status:      Spouse name: Not on file     Number of children: Not on file     Years of education: Not on file     Highest education level: Not on file   Occupational History     Not on file   Tobacco Use     Smoking status: Former Smoker     Packs/day: 1.00     Years: 30.00     Pack  years: 30.00     Types: Cigarettes, Cigarettes, Cigarettes     Quit date: 2003     Years since quittin.9     Smokeless tobacco: Never Used     Tobacco comment: No passive exposure   Substance and Sexual Activity     Alcohol use: No     Drug use: No     Sexual activity: Yes     Partners: Male   Other Topics Concern     Parent/sibling w/ CABG, MI or angioplasty before 65F 55M? Not Asked   Social History Narrative    2017 The patient lives with her daughter-in-law (who is present), , son, and 2 grandchildren (total of 6 people). [place].     Social Determinants of Health     Financial Resource Strain: Not on file   Food Insecurity: Not on file   Transportation Needs: Not on file   Physical Activity: Not on file   Stress: Not on file   Social Connections: Not on file   Intimate Partner Violence: Not on file   Housing Stability: Not on file       VITALS:  Patient Vitals for the past 24 hrs:   BP Temp Temp src Pulse Resp SpO2 Weight   12/15/21 0943 (!) 144/86 98.3  F (36.8  C) Oral 93 20 97 % 58.5 kg (129 lb)       PHYSICAL EXAM      Vitals: BP (!) 144/86   Pulse 93   Temp 98.3  F (36.8  C) (Oral)   Resp 20   Wt 58.5 kg (129 lb)   SpO2 97%   BMI 20.82 kg/m    General: Appears in no acute distress, awake, alert, interactive.  Eyes: Conjunctivae non-injected. Sclera anicteric.  HENT: Atraumatic.  Neck: Supple.  Respiratory/Chest: Respiration unlabored. No wheezing or crackles. No increased work of breathing.   Heart: RRR  Abdomen: non distended  Musculoskeletal: Normal extremities. No edema or erythema. Right hip with full ROM.   Skin: Normal color. No rash or diaphoresis.  Neurologic: Face symmetric, moves all extremities spontaneously. Speech clear.  Psychiatric: Oriented to person, place, and time. Affect appropriate.    LAB:  All pertinent labs reviewed and interpreted.  Results for orders placed or performed during the hospital encounter of 12/15/21   XR Pelvis and Hip Right 2 Views     Impression    IMPRESSION: Anatomic alignment right hip. No acute displaced right hip fracture. Bilateral acetabular over coverage/coxa profunda. Moderate bilateral hip osteoarthritis. No acute displaced pelvic fracture. Degenerative change both sacroiliac joints.   Small rounded calcifications overlie the left pelvis.   Chest XR,  PA & LAT    Impression    IMPRESSION: Minimal ill-defined airspace opacities noted in both bases which could certainly reflect a Covid infection.     Calcified granuloma noted laterally in the right lower lobe again. Heart and pulmonary vascularity are normal. No effusions.   CT Chest Pulmonary Embolism w Contrast    Impression    IMPRESSION:    1.  No pulmonary embolism.    2.  Minimal basilar opacities typical of COVID.    3.  Hepatic steatosis.         Comprehensive metabolic panel   Result Value Ref Range    Sodium 142 136 - 145 mmol/L    Potassium 3.8 3.5 - 5.0 mmol/L    Chloride 103 98 - 107 mmol/L    Carbon Dioxide (CO2) 29 22 - 31 mmol/L    Anion Gap 10 5 - 18 mmol/L    Urea Nitrogen 6 (L) 8 - 22 mg/dL    Creatinine 0.69 0.60 - 1.10 mg/dL    Calcium 10.0 8.5 - 10.5 mg/dL    Glucose 126 (H) 70 - 125 mg/dL    Alkaline Phosphatase 124 (H) 45 - 120 U/L    AST 30 0 - 40 U/L    ALT 34 0 - 45 U/L    Protein Total 7.7 6.0 - 8.0 g/dL    Albumin 4.0 3.5 - 5.0 g/dL    Bilirubin Total 0.5 0.0 - 1.0 mg/dL    GFR Estimate >90 >60 mL/min/1.73m2   Troponin I (now)   Result Value Ref Range    Troponin I 0.02 0.00 - 0.29 ng/mL   B-Type Natriuretic Peptide ([facility] Only)   Result Value Ref Range    BNP 15 0 - 78 pg/mL   D dimer quantitative   Result Value Ref Range    D-Dimer Quantitative 0.54 (H) 0.00 - 0.50 ug/mL FEU   CBC with platelets and differential   Result Value Ref Range    WBC Count 5.3 4.0 - 11.0 10e3/uL    RBC Count 5.16 3.80 - 5.20 10e6/uL    Hemoglobin 13.6 11.7 - 15.7 g/dL    Hematocrit 44.3 35.0 - 47.0 %    MCV 86 78 - 100 fL    MCH 26.4 (L) 26.5 - 33.0 pg    MCHC 30.7 (L) 31.5 -  36.5 g/dL    RDW 14.7 10.0 - 15.0 %    Platelet Count 219 150 - 450 10e3/uL    % Neutrophils 42 %    % Lymphocytes 37 %    % Monocytes 7 %    % Eosinophils 12 %    % Basophils 1 %    % Immature Granulocytes 1 %    NRBCs per 100 WBC 0 <1 /100    Absolute Neutrophils 2.3 1.6 - 8.3 10e3/uL    Absolute Lymphocytes 2.0 0.8 - 5.3 10e3/uL    Absolute Monocytes 0.4 0.0 - 1.3 10e3/uL    Absolute Eosinophils 0.6 0.0 - 0.7 10e3/uL    Absolute Basophils 0.0 0.0 - 0.2 10e3/uL    Absolute Immature Granulocytes 0.0 <=0.4 10e3/uL    Absolute NRBCs 0.0 10e3/uL       RADIOLOGY:  Reviewed all pertinent imaging. Please see official radiology report.  CT Chest Pulmonary Embolism w Contrast   Final Result   IMPRESSION:      1.  No pulmonary embolism.      2.  Minimal basilar opacities typical of COVID.      3.  Hepatic steatosis.               XR Pelvis and Hip Right 2 Views   Final Result   IMPRESSION: Anatomic alignment right hip. No acute displaced right hip fracture. Bilateral acetabular over coverage/coxa profunda. Moderate bilateral hip osteoarthritis. No acute displaced pelvic fracture. Degenerative change both sacroiliac joints.    Small rounded calcifications overlie the left pelvis.      Chest XR,  PA & LAT   Final Result   IMPRESSION: Minimal ill-defined airspace opacities noted in both bases which could certainly reflect a Covid infection.       Calcified granuloma noted laterally in the right lower lobe again. Heart and pulmonary vascularity are normal. No effusions.          EKG:    Performed at: 1:048:00    Impression: Left axis deviation.     Rate: 70 BPM  Rhythm: Normal sinus rhythm.  Axis: -31  NM Interval: 144 ms  QRS Interval: 90 ms  QTc Interval: 479 ms  ST Changes: No ST changes.  Comparison: Similar to ECG from 30-OCT-2021.    I have independently reviewed and interpreted the EKG(s) documented above.    PROCEDURES:   None      I, John Reed, am serving as a scribe to document services personally performed by   Palm based on my observation and the provider's statements to me. I, Jason Yeh MD attest that John Reed is acting in a scribe capacity, has observed my performance of the services and has documented them in accordance with my direction.    Jason Yeh M.D.  Emergency Medicine  [facility] EMERGENCY DEPARTMENT  [address] [place] [id number]  [phone]  Dept: [phone]     Mervin Yeh MD  12/15/21 4152

## 2021-12-15 NOTE — DISCHARGE INSTRUCTIONS
You have tested positive for COVID-19 and may be a candidate for treatment with monoclonal antibodies.  Monoclonal antibody treatment is a limited resource and patients must go through a randomization process and, if selected, can then receive infusion of monoclonal antibody.  You can submit your name for consideration of randomization through the [place] [employer] [place] [employer] Platform (MNRAP) by going to the website listed below and following enrollment instructions.      [email or web address]    Monoclonal antibody treatment can be used in people 12 years of age and older who weigh at least 88 pounds (40 kg) and:    1. Test positive for COVID-19  2. Are within 10 days of the start of their symptoms.  3. NOT BE HOSPITALIZED      What is monoclonal antibody treatment?    Antibodies are proteins that people's bodies make to fight viruses, such as the virus that causes COVID-19.  Antibodies made in a laboratory act a lot like natural antibodies to limit the amount of virus in your body.  They are called monoclonal antibodies.  Antibodies must be given into a vein by intravenous (IV) infusion.  Antibodies may be administered only in settings where health care providers have immediate access to medications to treat any reactions and where emergency medical systems are available, if needed.  Monoclonal antibody treatment with bamlanivimab and etesevimab or with casirivimab and imdevimab are for people who have tested positive for COVID-19 and have mild to moderate symptoms. These treatments are allowed by the U.S. Food and Drug Administration (FDA) under an Emergency Use Authorization (EUA) while clinical studies continue to look at their usefulness and safety.    What are the benefits?    Clinical trials for monoclonal antibodies against COVID-19 have shown a decrease in hospitalizations and emergency room visits and a decrease in the amount of virus  carried by an infected person. Studies are still ongoing.    What is Emergency Use Authorization?    Drug treatments available through [facility] are authorized by the FDA under an emergency use authorization (EUA), meaning they have not been fully FDA-approved. In an emergency, like a pandemic, it may not be possible to have all the evidence that the FDA would usually have before approving a treatment. In this situation, the FDA can make a judgment to release drug treatments for use before it's fully approved. If there's evidence that strongly suggests that patients have benefited from a treatment, the agency can issue an EUA to make it available. An EUA does not mean that a treatment has not been studied, or that a treatment is experimental. Receiving a drug under an EUA is different than participating in a clinical trial.

## 2021-12-15 NOTE — ED TRIAGE NOTES
Patient presents here with increased shortness of breath that has gotten worse over the past two days. She is positive for Covid 19. She is also noting right hip pain.

## 2021-12-16 ENCOUNTER — PATIENT OUTREACH (OUTPATIENT)
Dept: CARE COORDINATION | Facility: CLINIC | Age: 51
End: 2021-12-16
Payer: COMMERCIAL

## 2021-12-16 LAB
ATRIAL RATE - MUSE: 79 BPM
DIASTOLIC BLOOD PRESSURE - MUSE: NORMAL MMHG
INTERPRETATION ECG - MUSE: NORMAL
P AXIS - MUSE: 40 DEGREES
PR INTERVAL - MUSE: 144 MS
QRS DURATION - MUSE: 90 MS
QT - MUSE: 418 MS
QTC - MUSE: 479 MS
R AXIS - MUSE: -31 DEGREES
SYSTOLIC BLOOD PRESSURE - MUSE: NORMAL MMHG
T AXIS - MUSE: 49 DEGREES
VENTRICULAR RATE- MUSE: 79 BPM

## 2021-12-16 NOTE — PROGRESS NOTES
Clinic Care Coordination Contact    Care Coordination Hospital/ED Discharge Follow up Note    Hospital/ED Discharge date: 12/15/21    Reason/Diagnosis for Hospital/ED visit: pneumonia due to Covid    Are you feeling better, the same, or worse since your Hospital/ED visit? better    Symptoms:     Cough -  occasional    Shortness of breath:  Shortness of breath chronic for patient due to asthma     Chest pain:  No    Fever: No    Current temperature:  did not test    Headache:  Yes    Sore throat:  No    Nasal congestion:  No    Nausea/vomiting/diarrhea:  No    Body aches/joint pains:  Yes    Fatigue:  Yes    Home treatment measures used and outcome:  prescribed medications, rest    Pulse Oximeter/Oxygen Questions:    Were you sent home with a pulse oximeter?  Yes    Are you currently utilizing the pulse oximeter?  Yes    Do you understand how to use the home oximeter?  Yes    What are your current oxygen saturation levels?  96%    Oxygen saturation levels in ED/IP:  97%    Were you sent home with home oxygen?  No    Medications:    Were you prescribed any new medications?  No      Follow Up:    Do you have a follow up appointment scheduled with your PCP or specialist?  No  Dtr will call and schedule an appointment.  Instructed for it to be a telephone/virtual.  Dtr declining/refusing stating that Kulwant is out of her quarantine time.  Per chart review this does not appear correct however dtr stating that Kulwant tested positive 12/7 and should no longer have to quarantine.  Writer encouraged for patient to check with clinic prior to bringing patient in for face to face appointment.      Do you have a plan in place in the event of an emergency?  Yes    If patient is established or planning to establish primary care within [facility], Care Coordination was offered. Care Coordination accepted/declined:  No      Luzma Tesfaye RN  [facility] Care Coordination

## 2021-12-17 DIAGNOSIS — Z91.09 ENVIRONMENTAL ALLERGIES: ICD-10-CM

## 2021-12-17 RX ORDER — MONTELUKAST SODIUM 10 MG/1
10 TABLET ORAL AT BEDTIME
Qty: 30 TABLET | Refills: 6 | Status: SHIPPED | OUTPATIENT
Start: 2021-12-17 | End: 2022-06-28

## 2021-12-31 ENCOUNTER — OFFICE VISIT (OUTPATIENT)
Dept: PULMONOLOGY | Facility: OTHER | Age: 51
End: 2021-12-31
Payer: COMMERCIAL

## 2021-12-31 VITALS
BODY MASS INDEX: 21.03 KG/M2 | HEART RATE: 87 BPM | DIASTOLIC BLOOD PRESSURE: 80 MMHG | OXYGEN SATURATION: 98 % | SYSTOLIC BLOOD PRESSURE: 119 MMHG | WEIGHT: 130.3 LBS

## 2021-12-31 DIAGNOSIS — J45.51 SEVERE PERSISTENT ASTHMA WITH EXACERBATION (H): Primary | ICD-10-CM

## 2021-12-31 PROCEDURE — 99213 OFFICE O/P EST LOW 20 MIN: CPT | Performed by: INTERNAL MEDICINE

## 2021-12-31 RX ORDER — CODEINE PHOSPHATE AND GUAIFENESIN 10; 100 MG/5ML; MG/5ML
1-2 SOLUTION ORAL EVERY 4 HOURS PRN
Qty: 240 ML | Refills: 0 | Status: SHIPPED | OUTPATIENT
Start: 2021-12-31 | End: 2022-02-17

## 2021-12-31 NOTE — PROGRESS NOTES
Assessment/Plan:    50 y.o.-year-old woman with history of organic fuel exposure in the home; visit was conducted through a [place] .  Her PFTs have been previously noted not to be diagnostic due to poor effort.  Was evaluated by Dr. Marie and initiated on Dupixent and has been doing significantly better since then.  Unfortunately, since her last clinic visit she did develop Covid-19 despite being vaccinated against this.  For the present we would recommend;    Continue Breo Ellipta 1 puff daily.  She knows to gargle after using this.    Continue Spiriva.    Continue to use 3 times daily albuterol nebs.    Continue Protonix 40 mg daily.    Continue Dupixent injections.    Has received both doses of her Covid-19 vaccine.    I have given her a 1 month prescription for Robitussin with codeine given her intractable cough which is also causing chest wall pain.    Return to clinic in 6 weeks.    Tamra Duong  Pulmonary and Critical Care  [id number]          Subjective:    Patient ID: Ms. Amin is a 49 y.o.female with a past medical history significant for anxiety, arthritis, questionable asthma versus COPD, diabetes, hypertension and a prior history of SVT presents with a follow-up visit for her pulmonary complaints.  She follows with me fairly closely for her moderate-persistent, difficult to control asthma symptoms.  Since her last clinic visit with me, unfortunately contracted Covid-19 and has had significantly worse coughing and chest wall pain associated with this.  In concert with this, it has gotten colder and she states that her breathing is always slightly worse when it is cold.  Of note we had trialed a short course of furosemide to see if this alleviated her symptoms, however, it did not.  She denies fevers, chills, night sweats but does continue to endorse nighttime awakenings with coughing.    Pertinent past medical history:  48-year-old female here for follow-up.  Her breathing is a bit worse than 6  months ago.  She had multiple ER visits and evaluations.  This includes negative PE study.  She had a cath with a 75% LAD lesion which was intervened upon.  Since her catheterization she feels she has more heartburn.  Her breathing is worse with exertion.  Improves with rest.  It is also worse with cold weather.  Warm weather and humid air does not bother.  Symptoms localized to the chest.  Pertinent positives include burning sensation in the chest.  Pertinent negatives include no fever or hemoptysis.    Current Outpatient Medications   Medication Sig Dispense Refill     guaiFENesin-codeine (ROBITUSSIN AC) 100-10 MG/5ML solution Take 5-10 mLs by mouth every 4 hours as needed for cough 240 mL 0     acetaminophen (TYLENOL) 500 MG tablet Take 2 tablets (1,000 mg) by mouth every 8 hours 100 tablet 0     albuterol (PROAIR HFA/PROVENTIL HFA/VENTOLIN HFA) 108 (90 Base) MCG/ACT inhaler Inhale 2 puffs into the lungs every 4 hours as needed for shortness of breath / dyspnea 4 g 11     albuterol (PROVENTIL) (2.5 MG/3ML) 0.083% neb solution Inhale 2.5 mg into the lungs       amitriptyline (ELAVIL) 10 MG tablet 2 tablets At Bedtime       aspirin (ASA) 325 MG tablet Take 325 mg by mouth daily       ASPIRIN LOW DOSE 81 MG EC tablet Take 81 mg by mouth daily        atorvastatin (LIPITOR) 80 MG tablet TAKE 1 TABLET (80 MG TOTAL) BY MOUTH AT BEDTIME FOR CHOLESTEROL       B-D U/F 31G X 8 MM insulin pen needle USE ONE PEN NEEDLE DAILY AS NEEDED FOR  each 1     clopidogrel (PLAVIX) 75 MG tablet Take 1 tablet (75 mg) by mouth daily 90 tablet 3     colchicine (COLCYRS) 0.6 MG tablet TAKE 1 TABLET (0.6 MG TOTAL) BY MOUTH DAILY       Continuous Blood Gluc  (FREESTYLE MONICA 14 DAY READER) MICHAEL        Continuous Blood Gluc Sensor (FREESTYLE MONICA 14 DAY SENSOR) MISC USE 1 UNITS AS DIRECTED EVERY 14 (FOURTEEN) DAYS. 2 each 3     CONTOUR NEXT TEST test strip USE 1 EACH AS DIRECTED 3 (THREE) TIMES A DAY.       cyclobenzaprine  (FLEXERIL) 10 MG tablet TAKE 1 TABLET (10 MG TOTAL) BY MOUTH 3 (THREE) TIMES A DAY AS NEEDED FOR MUSCLE SPASMS.       diclofenac (VOLTAREN) 1 % topical gel Apply 2 g topically 4 times daily 50 g 1     Dupilumab (DUPIXENT) 200 MG/1.14ML SOSY Inject 200 mg Subcutaneous every 14 days 1.14 mL 6     ferrous sulfate (FEROSUL) 325 (65 Fe) MG tablet Take 325 mg by mouth       fluticasone-vilanterol (BREO ELLIPTA) 200-25 MCG/INH inhaler Inhale 1 puff into the lungs daily 60 each 11     furosemide (LASIX) 20 MG tablet Take 1 tablet (20 mg) by mouth daily for 10 days 10 tablet 1     gabapentin (NEURONTIN) 300 MG capsule Take 2 capsules (600 mg) by mouth 3 times daily 180 capsule 3     Global Inject Ease Lancets 30G MISC        guaiFENesin (ROBITUSSIN) 100 MG/5ML liquid Take 10 mLs (200 mg) by mouth every 4 hours as needed for cough 200 mL 1     hydrochlorothiazide (MICROZIDE) 12.5 MG capsule TAKE 1 CAPSULE (12.5 MG TOTAL) BY MOUTH DAILY FOR BLOOD PRESSURE 90 capsule 2     hydrocortisone (CORTAID) 1 % external cream Apply topically 2 times daily 60 g 0     insulin aspart (NOVOLOG FLEXPEN) 100 UNIT/ML pen Give before meals : For Pre-Meal Glucose: 140-189 give 1 unit, 190-239 give 2 units, 240-289 give 3 units, 290-339 give 4 units, = or >340 give 5 units. 15 mL 0     insulin glargine (LANTUS PEN) 100 UNIT/ML pen Inject 18 Units Subcutaneous At Bedtime 15 mL 3     insulin pen needle (32G X 4 MM) 32G X 4 MM miscellaneous Use one pen needles daily or as directed. 200 each 11     ipratropium - albuterol 0.5 mg/2.5 mg/3 mL (DUONEB) 0.5-2.5 (3) MG/3ML neb solution Take 1 vial (3 mLs) by nebulization every 6 hours as needed for shortness of breath / dyspnea or wheezing 240 mL 11     meclizine (ANTIVERT) 25 MG tablet Take 25 mg by mouth daily as needed        metFORMIN (GLUCOPHAGE-XR) 500 MG 24 hr tablet Take 1 tablet (500 mg) by mouth daily (with dinner) 90 tablet 1     metoprolol tartrate (LOPRESSOR) 25 MG tablet TAKE 1 TABLET (25 MG  TOTAL) BY MOUTH 2 (TWO) TIMES A DAY FOR BLOOD PRESSURE       montelukast (SINGULAIR) 10 MG tablet Take 1 tablet (10 mg) by mouth At Bedtime 30 tablet 6     omeprazole (PRILOSEC) 40 MG DR capsule Take 1 capsule (40 mg) by mouth daily 90 capsule 3     ondansetron (ZOFRAN) 4 MG tablet Take 1 tablet (4 mg) by mouth every 8 hours as needed for nausea 30 tablet 1     polyethylene glycol (MIRALAX) powder Take 17 g by mouth daily 510 g 1     polyvinyl alcohol (ARTIFICIAL TEARS) 1.4 % ophthalmic solution Place 1 drop into both eyes as needed for dry eyes 15 mL 3     predniSONE (DELTASONE) 2.5 MG tablet TAKE 2 TABLETS (5 MG) BY MOUTH DAILY       sucralfate (CARAFATE) 1 GM tablet Take 1 g by mouth       tiotropium (SPIRIVA RESPIMAT) 2.5 MCG/ACT inhalation aerosol Inhale 2 puffs into the lungs daily 4 g 1       Physical Exam   /80   Pulse 87   Wt 59.1 kg (130 lb 4.8 oz)   SpO2 98%   BMI 21.03 kg/m    Physical Exam  Constitutional:       Appearance: Normal appearance.   HENT:      Head: Normocephalic.      Mouth/Throat:      Mouth: Mucous membranes are moist.   Cardiovascular:      Rate and Rhythm: Normal rate and regular rhythm.      Heart sounds: Normal heart sounds.   Pulmonary:      Effort: Pulmonary effort is normal.      Breath sounds: Normal breath sounds. No wheezing.   Abdominal:      General: Abdomen is flat.   Skin:     General: Skin is warm.   Neurological:      General: No focal deficit present.      Mental Status: She is alert.               Tamra Duong MD  Pulmonary and Critical Care  (p) [phone]

## 2022-01-03 ENCOUNTER — HOSPITAL ENCOUNTER (EMERGENCY)
Facility: HOSPITAL | Age: 52
Discharge: HOME | End: 2022-01-03
Attending: EMERGENCY MEDICINE | Admitting: EMERGENCY MEDICINE
Payer: COMMERCIAL

## 2022-01-03 ENCOUNTER — APPOINTMENT (OUTPATIENT)
Dept: RADIOLOGY | Facility: HOSPITAL | Age: 52
End: 2022-01-03
Attending: FAMILY MEDICINE
Payer: COMMERCIAL

## 2022-01-03 VITALS
OXYGEN SATURATION: 97 % | HEART RATE: 82 BPM | RESPIRATION RATE: 24 BRPM | TEMPERATURE: 97.9 F | DIASTOLIC BLOOD PRESSURE: 91 MMHG | SYSTOLIC BLOOD PRESSURE: 136 MMHG | WEIGHT: 130 LBS | BODY MASS INDEX: 20.98 KG/M2

## 2022-01-03 DIAGNOSIS — J45.901 ASTHMA WITH ACUTE EXACERBATION, UNSPECIFIED ASTHMA SEVERITY, UNSPECIFIED WHETHER PERSISTENT: ICD-10-CM

## 2022-01-03 DIAGNOSIS — U07.1 INFECTION DUE TO 2019 NOVEL CORONAVIRUS: ICD-10-CM

## 2022-01-03 LAB
ANION GAP SERPL CALCULATED.3IONS-SCNC: 11 MMOL/L (ref 5–18)
BASOPHILS # BLD AUTO: 0.1 10E3/UL (ref 0–0.2)
BASOPHILS NFR BLD AUTO: 1 %
BNP SERPL-MCNC: <10 PG/ML (ref 0–80)
BUN SERPL-MCNC: 12 MG/DL (ref 8–22)
CALCIUM SERPL-MCNC: 9.6 MG/DL (ref 8.5–10.5)
CHLORIDE BLD-SCNC: 103 MMOL/L (ref 98–107)
CO2 SERPL-SCNC: 24 MMOL/L (ref 22–31)
CREAT SERPL-MCNC: 0.67 MG/DL (ref 0.6–1.1)
EOSINOPHIL # BLD AUTO: 1.6 10E3/UL (ref 0–0.7)
EOSINOPHIL NFR BLD AUTO: 20 %
ERYTHROCYTE [DISTWIDTH] IN BLOOD BY AUTOMATED COUNT: 14.9 % (ref 10–15)
FLUAV RNA SPEC QL NAA+PROBE: NEGATIVE
FLUBV RNA RESP QL NAA+PROBE: NEGATIVE
GFR SERPL CREATININE-BSD FRML MDRD: >90 ML/MIN/1.73M2
GLUCOSE BLD-MCNC: 161 MG/DL (ref 70–125)
HCT VFR BLD AUTO: 39.9 % (ref 35–47)
HGB BLD-MCNC: 12.7 G/DL (ref 11.7–15.7)
IMM GRANULOCYTES # BLD: 0 10E3/UL
IMM GRANULOCYTES NFR BLD: 0 %
LYMPHOCYTES # BLD AUTO: 2.8 10E3/UL (ref 0.8–5.3)
LYMPHOCYTES NFR BLD AUTO: 34 %
MCH RBC QN AUTO: 26.9 PG (ref 26.5–33)
MCHC RBC AUTO-ENTMCNC: 31.8 G/DL (ref 31.5–36.5)
MCV RBC AUTO: 85 FL (ref 78–100)
MONOCYTES # BLD AUTO: 0.7 10E3/UL (ref 0–1.3)
MONOCYTES NFR BLD AUTO: 8 %
NEUTROPHILS # BLD AUTO: 3.1 10E3/UL (ref 1.6–8.3)
NEUTROPHILS NFR BLD AUTO: 37 %
NRBC # BLD AUTO: 0 10E3/UL
NRBC BLD AUTO-RTO: 0 /100
PLATELET # BLD AUTO: 213 10E3/UL (ref 150–450)
POTASSIUM BLD-SCNC: 3.5 MMOL/L (ref 3.5–5)
RBC # BLD AUTO: 4.72 10E6/UL (ref 3.8–5.2)
SARS-COV-2 RNA RESP QL NAA+PROBE: POSITIVE
SODIUM SERPL-SCNC: 138 MMOL/L (ref 136–145)
WBC # BLD AUTO: 8.3 10E3/UL (ref 4–11)

## 2022-01-03 PROCEDURE — 250N000012 HC RX MED GY IP 250 OP 636 PS 637: Performed by: FAMILY MEDICINE

## 2022-01-03 PROCEDURE — C9803 HOPD COVID-19 SPEC COLLECT: HCPCS

## 2022-01-03 PROCEDURE — 71046 X-RAY EXAM CHEST 2 VIEWS: CPT

## 2022-01-03 PROCEDURE — 99284 EMERGENCY DEPT VISIT MOD MDM: CPT | Mod: 25

## 2022-01-03 PROCEDURE — 87636 SARSCOV2 & INF A&B AMP PRB: CPT | Performed by: FAMILY MEDICINE

## 2022-01-03 PROCEDURE — 94640 AIRWAY INHALATION TREATMENT: CPT

## 2022-01-03 PROCEDURE — 36415 COLL VENOUS BLD VENIPUNCTURE: CPT | Performed by: FAMILY MEDICINE

## 2022-01-03 PROCEDURE — 85004 AUTOMATED DIFF WBC COUNT: CPT | Performed by: FAMILY MEDICINE

## 2022-01-03 PROCEDURE — 83880 ASSAY OF NATRIURETIC PEPTIDE: CPT | Performed by: FAMILY MEDICINE

## 2022-01-03 PROCEDURE — 250N000013 HC RX MED GY IP 250 OP 250 PS 637: Performed by: FAMILY MEDICINE

## 2022-01-03 PROCEDURE — 80048 BASIC METABOLIC PNL TOTAL CA: CPT | Performed by: FAMILY MEDICINE

## 2022-01-03 RX ORDER — ALBUTEROL SULFATE 90 UG/1
6 AEROSOL, METERED RESPIRATORY (INHALATION) ONCE
Status: COMPLETED | OUTPATIENT
Start: 2022-01-03 | End: 2022-01-03

## 2022-01-03 RX ORDER — PREDNISONE 20 MG/1
40 TABLET ORAL ONCE
Status: COMPLETED | OUTPATIENT
Start: 2022-01-03 | End: 2022-01-03

## 2022-01-03 RX ORDER — PREDNISONE 20 MG/1
40 TABLET ORAL DAILY
Qty: 10 TABLET | Refills: 0 | Status: SHIPPED | OUTPATIENT
Start: 2022-01-03 | End: 2022-01-08

## 2022-01-03 RX ORDER — ALBUTEROL SULFATE 90 UG/1
6 AEROSOL, METERED RESPIRATORY (INHALATION) ONCE
Status: DISCONTINUED | OUTPATIENT
Start: 2022-01-03 | End: 2022-01-03

## 2022-01-03 RX ADMIN — PREDNISONE 40 MG: 20 TABLET ORAL at 16:34

## 2022-01-03 RX ADMIN — ALBUTEROL SULFATE 6 PUFF: 90 AEROSOL, METERED RESPIRATORY (INHALATION) at 16:34

## 2022-01-03 NOTE — ED PROVIDER NOTES
EMERGENCY DEPARTMENT ENCOUNTER      NAME: Kulwant Amin  AGE: 52 year old female  YOB: 1970  MRN: [id number]  EVALUATION DATE & TIME: 1/3/2022  3:50 PM    PCP: Adrien Cardoza    ED PROVIDER: Shy Pompa MD    Chief Complaint   Patient presents with     Cough         FINAL IMPRESSION:  1. Asthma with acute exacerbation, unspecified asthma severity, unspecified whether persistent    2. Infection due to 2019 novel coronavirus          ED COURSE & MEDICAL DECISION MAKING:    Pertinent Labs & Imaging studies reviewed. (See chart for details)  52 year old female with history of asthma, anxiety/depression, HTN, CAD who presents to the Emergency Department for evaluation of 3 days of cough, wheezing, shortness of breath.  Covid vaccinated, no ill contacts fevers chills.  Differential includes asthma exacerbation, new pulmonary edema/CHF, viral syndrome, influenza, COVID-19, pneumonia.  Doubt symptomatic anemia.    Patient has audible wheezing on exam.  Given 6 puffs of albuterol MDI and 40 mg of prednisone orally.  Chest x-ray unremarkable.  CBC, BMP, BNP unremarkable.  Covid and influenza swab pending at time of dictation.  Patient updated, feels improved and will be discharged home with prednisone burst and increasing her albuterol inhaler frequency.    9:59 PM - covid test positive. Pt informed of same w phone .       ED Course as of 01/03/22 2200   Mon Jan 03, 2022   1558 I met the patient and performed my initial interview and exam. I saw the patient in PPE including a face shield, N95 mask, goggles, gown, and gloves.      1704 Called lab to check to see if labs are in process.    1704 [facility] labs were sent.  Lab unable to find.  Have to redraw everything.   1733 Checked w RN re: labs   1834 Updated on results and plan for home.         At the conclusion of the encounter I discussed the results of all of the tests and the disposition. The questions were answered. The patient or family  acknowledged understanding and was agreeable with the care plan.    MEDICATIONS GIVEN IN THE EMERGENCY:  Medications   predniSONE (DELTASONE) tablet 40 mg (40 mg Oral Given 1/3/22 1634)   albuterol (PROVENTIL HFA/VENTOLIN HFA) inhaler (6 puffs Inhalation Given 1/3/22 1634)       NEW PRESCRIPTIONS STARTED AT TODAY'S ER VISIT  Discharge Medication List as of 1/3/2022  6:48 PM      START taking these medications    Details   !! predniSONE (DELTASONE) 20 MG tablet Take 2 tablets (40 mg) by mouth daily for 5 days, Disp-10 tablet, R-0, Local Print       !! - Potential duplicate medications found. Please discuss with provider.             =================================================================    HPI    Patient information was obtained from: Patient    Use of Intrepreter: Yes (Vocera) Language: Icelandic        Kulwant Amin is a 52 year old female with pertinent medical history of CAD, COPD, asthma, latent tuberculosis, right pulmonary nodule, DM2, HTN, and HLD who presents to the ED via walk in for evaluation of shortness of breath and a cough.    Patient reports shortness of breath, wheezing, and a generalized chest tightness since 12/31 (3 days ago). She also reports a nonproductive cough. She has a history of asthma, which she uses a Breo and Spiriva inhaler for. She also has an albuterol inhaler that she normally uses once per day, but has been using twice per day due to her current symptoms. Patient has been vaccinated against COVID x 2 since September 2021. She denies any known sick contacts. Denies fever, chills, chest pain, or any other complaints at this time.       REVIEW OF SYSTEMS  Constitutional:  Denies fever, chills, weight loss or weakness  HENT:  Denies sore throat, ear pain, congestion  Respiratory: Positive for shortness of breath, wheezing, nonproductive cough  Cardiovascular:  No CP, palpitations. Positive for chest tightness   GI:  Denies abdominal pain, nausea, vomiting, diarrhea  : Denies  dysuria, denies hematuria  Neurologic:  Denies headache, focal weakness or sensory changes  All other systems negative unless noted in HPI.      PAST MEDICAL HISTORY:  Past Medical History:   Diagnosis Date     Acute asthma exacerbation 1/6/2020     Anxiety      Arthritis      Asthma      Asthma exacerbation 11/19/2015     COPD (chronic obstructive pulmonary disease) (H)      Coronary artery disease due to lipid rich plaque 1/25/2018     Depression      Diabetes mellitus, type II (H)      Essential hypertension 4/14/2017     GERD (gastroesophageal reflux disease)      Lactic acid acidosis 11/23/2018     Lower GI bleeding      Nonspecific chest pain 12/07/2018    2 negative stress tests since coronary stenting in 2018; in June 2020 she had 1 week of this pain with normal cardiac enzymes and relief with Toradol     Pneumonia      TB lung, latent     9 mos INH       PAST SURGICAL HISTORY:  Past Surgical History:   Procedure Laterality Date     CORONARY STENT PLACEMENT  2018     CV CORONARY ANGIOGRAM N/A 1/25/2018    Procedure: Coronary Angiogram;  Surgeon: Angelo Serrano MD;  Location: [facility];  Service:      ME ESOPHAGOGASTRODUODENOSCOPY TRANSORAL DIAGNOSTIC N/A 12/10/2018    Procedure: ESOPHAGOGASTRODUODENOSCOPY (EGD);  Surgeon: Eddie Renteria MD;  Location: [facility];  Service: Gastroenterology     ME ESOPHAGOGASTRODUODENOSCOPY TRANSORAL DIAGNOSTIC N/A 12/3/2020    Procedure: ESOPHAGOGASTRODUODENOSCOPY (EGD) with biospies ;  Surgeon: Avi Crow MD;  Location: [facility];  Service: Gastroenterology     [facility] COLONOSCOPY W/WO BRUSH/WASH N/A 12/10/2018    Procedure: COLONOSCOPY with polypectomy using biopsy forceps;  Surgeon: Eddie Renteria MD;  Location: [facility];  Service: Gastroenterology       CURRENT MEDICATIONS:    Prior to Admission Medications   Prescriptions Last Dose Informant Patient Reported? Taking?   ASPIRIN LOW DOSE 81 MG EC tablet   Yes No   Sig: Take 81 mg by  mouth daily    B-D U/F 31G X 8 MM insulin pen needle   No No   Sig: USE ONE PEN NEEDLE DAILY AS NEEDED FOR SSI   CONTOUR NEXT TEST test strip   Yes No   Sig: USE 1 EACH AS DIRECTED 3 (THREE) TIMES A DAY.   Continuous Blood Gluc  (FREESTYLE MONICA 14 DAY READER) MICHAEL   Yes No   Continuous Blood Gluc Sensor (FREESTYLE MONICA 14 DAY SENSOR) MISC   No No   Sig: USE 1 UNITS AS DIRECTED EVERY 14 (FOURTEEN) DAYS.   Dupilumab (DUPIXENT) 200 MG/1.14ML SOSY   No No   Sig: Inject 200 mg Subcutaneous every 14 days   Global Inject Ease Lancets 30G MISC   Yes No   acetaminophen (TYLENOL) 500 MG tablet   No No   Sig: Take 2 tablets (1,000 mg) by mouth every 8 hours   albuterol (PROAIR HFA/PROVENTIL HFA/VENTOLIN HFA) 108 (90 Base) MCG/ACT inhaler   No No   Sig: Inhale 2 puffs into the lungs every 4 hours as needed for shortness of breath / dyspnea   albuterol (PROVENTIL) (2.5 MG/3ML) 0.083% neb solution   Yes No   Sig: Inhale 2.5 mg into the lungs   amitriptyline (ELAVIL) 10 MG tablet   Yes No   Si tablets At Bedtime   aspirin (ASA) 325 MG tablet   Yes No   Sig: Take 325 mg by mouth daily   atorvastatin (LIPITOR) 80 MG tablet   Yes No   Sig: TAKE 1 TABLET (80 MG TOTAL) BY MOUTH AT BEDTIME FOR CHOLESTEROL   clopidogrel (PLAVIX) 75 MG tablet   No No   Sig: Take 1 tablet (75 mg) by mouth daily   colchicine (COLCYRS) 0.6 MG tablet   Yes No   Sig: TAKE 1 TABLET (0.6 MG TOTAL) BY MOUTH DAILY   cyclobenzaprine (FLEXERIL) 10 MG tablet   Yes No   Sig: TAKE 1 TABLET (10 MG TOTAL) BY MOUTH 3 (THREE) TIMES A DAY AS NEEDED FOR MUSCLE SPASMS.   diclofenac (VOLTAREN) 1 % topical gel   No No   Sig: Apply 2 g topically 4 times daily   ferrous sulfate (FEROSUL) 325 (65 Fe) MG tablet   Yes No   Sig: Take 325 mg by mouth   fluticasone-vilanterol (BREO ELLIPTA) 200-25 MCG/INH inhaler   No No   Sig: Inhale 1 puff into the lungs daily   furosemide (LASIX) 20 MG tablet   No No   Sig: Take 1 tablet (20 mg) by mouth daily for 10 days   gabapentin  (NEURONTIN) 300 MG capsule   No No   Sig: Take 2 capsules (600 mg) by mouth 3 times daily   guaiFENesin (ROBITUSSIN) 100 MG/5ML liquid   No No   Sig: Take 10 mLs (200 mg) by mouth every 4 hours as needed for cough   guaiFENesin-codeine (ROBITUSSIN AC) 100-10 MG/5ML solution   No No   Sig: Take 5-10 mLs by mouth every 4 hours as needed for cough   hydrochlorothiazide (MICROZIDE) 12.5 MG capsule   No No   Sig: TAKE 1 CAPSULE (12.5 MG TOTAL) BY MOUTH DAILY FOR BLOOD PRESSURE   hydrocortisone (CORTAID) 1 % external cream   No No   Sig: Apply topically 2 times daily   insulin aspart (NOVOLOG FLEXPEN) 100 UNIT/ML pen   No No   Sig: Give before meals : For Pre-Meal Glucose: 140-189 give 1 unit, 190-239 give 2 units, 240-289 give 3 units, 290-339 give 4 units, = or >340 give 5 units.   insulin glargine (LANTUS PEN) 100 UNIT/ML pen   No No   Sig: Inject 18 Units Subcutaneous At Bedtime   insulin pen needle (32G X 4 MM) 32G X 4 MM miscellaneous   No No   Sig: Use one pen needles daily or as directed.   ipratropium - albuterol 0.5 mg/2.5 mg/3 mL (DUONEB) 0.5-2.5 (3) MG/3ML neb solution   No No   Sig: Take 1 vial (3 mLs) by nebulization every 6 hours as needed for shortness of breath / dyspnea or wheezing   meclizine (ANTIVERT) 25 MG tablet   Yes No   Sig: Take 25 mg by mouth daily as needed    metFORMIN (GLUCOPHAGE-XR) 500 MG 24 hr tablet   No No   Sig: Take 1 tablet (500 mg) by mouth daily (with dinner)   metoprolol tartrate (LOPRESSOR) 25 MG tablet   Yes No   Sig: TAKE 1 TABLET (25 MG TOTAL) BY MOUTH 2 (TWO) TIMES A DAY FOR BLOOD PRESSURE   montelukast (SINGULAIR) 10 MG tablet   No No   Sig: Take 1 tablet (10 mg) by mouth At Bedtime   omeprazole (PRILOSEC) 40 MG DR capsule   No No   Sig: Take 1 capsule (40 mg) by mouth daily   ondansetron (ZOFRAN) 4 MG tablet   No No   Sig: Take 1 tablet (4 mg) by mouth every 8 hours as needed for nausea   polyethylene glycol (MIRALAX) powder   No No   Sig: Take 17 g by mouth daily    polyvinyl alcohol (ARTIFICIAL TEARS) 1.4 % ophthalmic solution   No No   Sig: Place 1 drop into both eyes as needed for dry eyes   predniSONE (DELTASONE) 2.5 MG tablet   Yes No   Sig: TAKE 2 TABLETS (5 MG) BY MOUTH DAILY   sucralfate (CARAFATE) 1 GM tablet   Yes No   Sig: Take 1 g by mouth   tiotropium (SPIRIVA RESPIMAT) 2.5 MCG/ACT inhalation aerosol   No No   Sig: Inhale 2 puffs into the lungs daily      Facility-Administered Medications: None       ALLERGIES:  No Known Allergies    FAMILY HISTORY:  Family History   Problem Relation Age of Onset     Other - See Comments Mother          of an intestinal problem     Ulcers Father          of gastritis     Breast Cancer No family hx of        SOCIAL HISTORY:  Social History     Tobacco Use     Smoking status: Former Smoker     Packs/day: 1.00     Years: 30.00     Pack years: 30.00     Types: Cigarettes, Cigarettes, Cigarettes     Quit date: 2003     Years since quittin.0     Smokeless tobacco: Never Used     Tobacco comment: No passive exposure   Substance Use Topics     Alcohol use: No     Drug use: No        VITALS:  Patient Vitals for the past 24 hrs:   BP Temp Temp src Pulse Resp SpO2 Weight   22 1800 (!) 136/91 -- -- 82 -- 97 % --   22 1730 136/78 -- -- 75 -- 96 % --   22 1700 132/84 -- -- 79 -- 95 % --   22 1630 -- -- -- 80 -- 96 % --   22 1629 123/79 -- -- 80 -- 96 % --   22 1628 119/83 -- -- 76 -- 96 % --   22 1331 (!) 146/91 97.9  F (36.6  C) Tympanic 101 24 98 % 59 kg (130 lb)       PHYSICAL EXAM    General Appearance: Well-appearing, well-nourished, no acute distress   Head:  Normocephalic  Eyes:   conjunctiva/corneas clear  ENT:  membranes are moist without pallor  Neck:  Supple  Cardio:  Regular rate and rhythm, no murmur/gallop/rub, 2+ pulses symmetric in all extremities  Pulm:  Audibile wheezing, no respiratory distress.  Abdomen:  Soft, non-tender, non distended,no rebound or  guarding.  Extremities: Moves all extremities normally, normal gait  Skin:  Skin warm, dry, no rashes  Neuro:  Alert and oriented ×3, moving all extremities, no gross sensory defects     RADIOLOGY/LABS:  Reviewed all pertinent imaging. Please see official radiology report. All pertinent labs reviewed and interpreted.    Results for orders placed or performed during the hospital encounter of 01/03/22   Chest XR,  PA & LAT    Impression    IMPRESSION: Low lung volumes. Clearing of the peribronchiolar opacities noted before in both bases. No signs of pneumonia or failure. Coronary artery calcifications. Heart and pulmonary vascularity are normal.   Basic metabolic panel   Result Value Ref Range    Sodium 138 136 - 145 mmol/L    Potassium 3.5 3.5 - 5.0 mmol/L    Chloride 103 98 - 107 mmol/L    Carbon Dioxide (CO2) 24 22 - 31 mmol/L    Anion Gap 11 5 - 18 mmol/L    Urea Nitrogen 12 8 - 22 mg/dL    Creatinine 0.67 0.60 - 1.10 mg/dL    Calcium 9.6 8.5 - 10.5 mg/dL    Glucose 161 (H) 70 - 125 mg/dL    GFR Estimate >90 >60 mL/min/1.73m2   B-Type Natriuretic Peptide ([facility] Only)   Result Value Ref Range    BNP <10 0 - 80 pg/mL   Symptomatic; Unknown Influenza A/B & SARS-CoV2 (COVID-19) Virus PCR Multiplex Nasopharyngeal    Specimen: Nasopharyngeal; Swab   Result Value Ref Range    Influenza A PCR Negative Negative    Influenza B PCR Negative Negative    SARS CoV2 PCR Positive (A) Negative   CBC with platelets and differential   Result Value Ref Range    WBC Count 8.3 4.0 - 11.0 10e3/uL    RBC Count 4.72 3.80 - 5.20 10e6/uL    Hemoglobin 12.7 11.7 - 15.7 g/dL    Hematocrit 39.9 35.0 - 47.0 %    MCV 85 78 - 100 fL    MCH 26.9 26.5 - 33.0 pg    MCHC 31.8 31.5 - 36.5 g/dL    RDW 14.9 10.0 - 15.0 %    Platelet Count 213 150 - 450 10e3/uL    % Neutrophils 37 %    % Lymphocytes 34 %    % Monocytes 8 %    % Eosinophils 20 %    % Basophils 1 %    % Immature Granulocytes 0 %    NRBCs per 100 WBC 0 <1 /100    Absolute Neutrophils 3.1  1.6 - 8.3 10e3/uL    Absolute Lymphocytes 2.8 0.8 - 5.3 10e3/uL    Absolute Monocytes 0.7 0.0 - 1.3 10e3/uL    Absolute Eosinophils 1.6 (H) 0.0 - 0.7 10e3/uL    Absolute Basophils 0.1 0.0 - 0.2 10e3/uL    Absolute Immature Granulocytes 0.0 <=0.4 10e3/uL    Absolute NRBCs 0.0 10e3/uL       The creation of this record is based on the scribe s observations of the work being performed by Shy Pompa MD and the provider s statements to them. It was created on his behalf by Jyothi Dolan, a trained medical scribe. This document has been checked and approved by the attending provider.    Shy Pompa MD  Emergency Medicine  [facility] EMERGENCY DEPARTMENT  [facility] [facility] [address]  [phone]  Dept: [phone]     Shy Pompa MD  01/03/22 3747       Shy Pompa MD  01/03/22 9777

## 2022-01-03 NOTE — ED TRIAGE NOTES
Pt with a hx of asthma, has had a cough, chest tightness and wheezing for the last 3 days. Pt s home nurse told pt to come in today. Pt has had covid shot. Daughter reports pt had covid 1 month ago. Pt has some resp effort and a lot of coughing. Some wheezes heard.  sats 98% . Speaks Slovenian.

## 2022-01-03 NOTE — ED PROVIDER NOTES
ED Triage Provider Note  [facility]  Encounter Date: Elliott 3, 2022    History:  Chief Complaint   Patient presents with     Cough     History from patient and daughter-in-law using online     Kulwant JEANNINE Amin is a 52 year old female who presents to the ED with 3 days of increased shortness of breath and cough.  She has a history of asthma.  Subjective fever.  She has central chest pain when she coughs but no pain with deep respiration.  No leg swelling.  No nausea, vomiting, diarrhea.  No abdominal pain.  I reviewed her medicines, she uses her albuterol a couple of times a day along with Spiriva and Breo Ellipta.  She did get her Covid shot.    Review of Systems:   ROS: 10 point ROS neg other than the symptoms noted above in the HPI.      Exam:  /78   Pulse 75   Temp 97.9  F (36.6  C) (Tympanic)   Resp 24   Wt 59 kg (130 lb)   SpO2 96%   BMI 20.98 kg/m    Physical Exam  Vitals and nursing note reviewed.   Constitutional:       Appearance: Normal appearance.   HENT:      Head: Normocephalic and atraumatic.      Right Ear: External ear normal.      Left Ear: External ear normal.      Nose: Nose normal.      Mouth/Throat:      Mouth: Mucous membranes are moist.   Eyes:      Extraocular Movements: Extraocular movements intact.      Conjunctiva/sclera: Conjunctivae normal.      Pupils: Pupils are equal, round, and reactive to light.   Cardiovascular:      Rate and Rhythm: Normal rate and regular rhythm.   Pulmonary:      Breath sounds: Wheezing present. No rales.      Comments: Mild tachypnea with inspiratory and expiratory wheezes  Abdominal:      General: Abdomen is flat. There is no distension.      Palpations: Abdomen is soft.      Tenderness: There is no abdominal tenderness. There is no guarding.   Musculoskeletal:         General: Normal range of motion.      Cervical back: Normal range of motion and neck supple.      Right lower leg: No edema.      Left lower leg: No  edema.   Lymphadenopathy:      Cervical: No cervical adenopathy.   Skin:     General: Skin is warm and dry.   Neurological:      General: No focal deficit present.      Mental Status: She is alert and oriented to person, place, and time. Mental status is at baseline.      Comments: No gross focal neurologic deficits   Psychiatric:         Mood and Affect: Mood normal.         Behavior: Behavior normal.         Thought Content: Thought content normal.           Medical Decision Making:  Patient arriving to the ED with problem as above. A medical screening exam was performed.  Inhaler, prednisone, chest x-ray, labs orders initiated from Triage. The patient is appropriate to wait in triage.      Adeel Brown MD on 1/3/2022 at 3:08 PM      Lab/Imaging Results:  Results for orders placed or performed during the hospital encounter of 01/03/22   Chest XR,  PA & LAT    Impression    IMPRESSION: Low lung volumes. Clearing of the peribronchiolar opacities noted before in both bases. No signs of pneumonia or failure. Coronary artery calcifications. Heart and pulmonary vascularity are normal.   CBC with platelets and differential   Result Value Ref Range    WBC Count 8.3 4.0 - 11.0 10e3/uL    RBC Count 4.72 3.80 - 5.20 10e6/uL    Hemoglobin 12.7 11.7 - 15.7 g/dL    Hematocrit 39.9 35.0 - 47.0 %    MCV 85 78 - 100 fL    MCH 26.9 26.5 - 33.0 pg    MCHC 31.8 31.5 - 36.5 g/dL    RDW 14.9 10.0 - 15.0 %    Platelet Count 213 150 - 450 10e3/uL    % Neutrophils 37 %    % Lymphocytes 34 %    % Monocytes 8 %    % Eosinophils 20 %    % Basophils 1 %    % Immature Granulocytes 0 %    NRBCs per 100 WBC 0 <1 /100    Absolute Neutrophils 3.1 1.6 - 8.3 10e3/uL    Absolute Lymphocytes 2.8 0.8 - 5.3 10e3/uL    Absolute Monocytes 0.7 0.0 - 1.3 10e3/uL    Absolute Eosinophils 1.6 (H) 0.0 - 0.7 10e3/uL    Absolute Basophils 0.1 0.0 - 0.2 10e3/uL    Absolute Immature Granulocytes 0.0 <=0.4 10e3/uL    Absolute NRBCs 0.0 10e3/uL        Interventions:  Medications   predniSONE (DELTASONE) tablet 40 mg (40 mg Oral Given 1/3/22 1634)   albuterol (PROVENTIL HFA/VENTOLIN HFA) inhaler (6 puffs Inhalation Given 1/3/22 1634)       Diagnosis:  1. Asthma with acute exacerbation, unspecified asthma severity, unspecified whether persistent         Adeel Brown MD  01/03/22 7713

## 2022-01-04 NOTE — DISCHARGE INSTRUCTIONS
Take prednisone as prescribed.  Your blood work and chest x-ray today were normal.  I am waiting on the results of your Covid and your influenza test.  If these are abnormal we will call you.  Continue to use your scheduled inhalers as prescribed.  The albuterol inhaler, which is the one in the red box you can use up to every 4 hours.

## 2022-01-05 ENCOUNTER — TELEPHONE (OUTPATIENT)
Dept: NURSING | Facility: CLINIC | Age: 52
End: 2022-01-05
Payer: COMMERCIAL

## 2022-01-05 NOTE — TELEPHONE ENCOUNTER
Telephone call    Daughter calling her mother was seen in ED 1/3/2022.  Daughter was told to report if the patients pulse was high.  So she was calling to report that her pulse is  112.  She still is having wheezing as well.  Routing to PCP    Gaviota Bello RN   [facility] Nurse Advisor  12:52 PM 1/5/2022

## 2022-01-05 NOTE — CONFIDENTIAL NOTE
Discussed with RN.   Tachycardia is expected with covid, albuterol, and prednisone use.   Continue to stay hydrated especially while on these medications.     If struggling to breathe, should go back to ER.       Karissa Dover MD

## 2022-01-05 NOTE — TELEPHONE ENCOUNTER
Changed tomorrow's appointment to virtual due to patient having covid.     Gave patient all the advice from Dr. Dover regarding if patient feels SOB, they should go back to ED. That the pulse of 112 is to be expected due to the albuterol, prednisone, and covid diagnosis.     Roxanna ALCANTARA RN

## 2022-01-05 NOTE — TELEPHONE ENCOUNTER
Please schedule telephone visit with any available provider today or please triage . I am not in the office today.    Dr. Adrien Cardoza  1/5/2022 3:01 PM

## 2022-01-06 ENCOUNTER — VIRTUAL VISIT (OUTPATIENT)
Dept: FAMILY MEDICINE | Facility: CLINIC | Age: 52
End: 2022-01-06
Payer: COMMERCIAL

## 2022-01-06 DIAGNOSIS — Z79.4 TYPE 2 DIABETES MELLITUS TREATED WITH INSULIN (H): ICD-10-CM

## 2022-01-06 DIAGNOSIS — E11.9 TYPE 2 DIABETES MELLITUS TREATED WITH INSULIN (H): ICD-10-CM

## 2022-01-06 DIAGNOSIS — J44.9 CHRONIC OBSTRUCTIVE PULMONARY DISEASE, UNSPECIFIED COPD TYPE (H): ICD-10-CM

## 2022-01-06 DIAGNOSIS — U07.1 INFECTION DUE TO 2019 NOVEL CORONAVIRUS: Primary | ICD-10-CM

## 2022-01-06 DIAGNOSIS — F33.9 RECURRENT MAJOR DEPRESSIVE DISORDER, REMISSION STATUS UNSPECIFIED (H): ICD-10-CM

## 2022-01-06 PROCEDURE — 99213 OFFICE O/P EST LOW 20 MIN: CPT | Mod: TEL | Performed by: FAMILY MEDICINE

## 2022-01-06 NOTE — PROGRESS NOTES
Kulwant is a 52 year old who is being evaluated via a billable telephone visit.      What phone number would you like to be contacted at? [phone]  How would you like to obtain your AVS? Mail a copy      Subjective   Kulwant is a 52 year old who presents for the following health issues  accompanied by her  and and daughter in law.    HPI   Patient states she is doing better today. Still have mild cough and wheezing but improving ( at baseline ). Saw Pulm a few days ago, was given oral prednisone.     How are you feeling today? a little better  In the past 24 hours have you had shortness of breath when speaking, walking, or climbing stairs? Yes but at my baseline  Do you have a cough? Yes, I have a cough but it's not worse, improving since last ED visit 3 days ago.  When is the last time you had a fever greater than 100? 3 days ago  Are you having any other symptoms? No, still having mild cough and whezing ( also has uncontrolled asthma)   Do you have any other stressors you would like to discuss with your provider? No      Review of Systems   CONSTITUTIONAL:No fever for the past few days  CV: NEGATIVE for chest pain, palpitations or peripheral edema      Objective           Vitals: Daughter-in-law checked vitals while I was on the phone with the patient.  Blood pressure 130/96, pulse 96 and oxygen saturation 97%      Physical Exam   Patient was able to speak in full sentences.  Voice is normal at her baseline.  No audible cough or wheezing during the telephone visit.    Infection due to 2019 novel coronavirus  Improving since last ED visit 3 days ago.  Cough is improving.  Still have some wheezing.  Has history of uncontrolled asthma.  Saw pulmonology last week, on oral steroid.  Discussed indications to call or go to the hospital.  Pulse ox 97% during visit.  BP stable.  Advised go to the hospital if develops chest pain or O2 sat drops to low 90s with worsening shortness of breath.  Follow-up telephone  visit early next week.    Recurrent major depressive disorder, remission status unspecified (H)  Stable.    Type 2 diabetes mellitus treated with insulin (H)  A1c when she comes in for follow-up in person.    Chronic obstructive pulmonary disease, unspecified COPD type (H)  Follow-up with pulmonology as scheduled.            Phone call duration: 17   minutes

## 2022-01-09 DIAGNOSIS — Z76.0 ENCOUNTER FOR MEDICATION REFILL: Primary | ICD-10-CM

## 2022-01-10 RX ORDER — SUCRALFATE 1 G/1
TABLET ORAL
Qty: 120 TABLET | Refills: 11 | Status: SHIPPED | OUTPATIENT
Start: 2022-01-10 | End: 2023-01-19

## 2022-01-11 ENCOUNTER — VIRTUAL VISIT (OUTPATIENT)
Dept: FAMILY MEDICINE | Facility: CLINIC | Age: 52
End: 2022-01-11
Payer: COMMERCIAL

## 2022-01-11 DIAGNOSIS — I10 PRIMARY HYPERTENSION: ICD-10-CM

## 2022-01-11 DIAGNOSIS — E11.9 TYPE 2 DIABETES MELLITUS TREATED WITHOUT INSULIN (H): ICD-10-CM

## 2022-01-11 DIAGNOSIS — F33.9 RECURRENT MAJOR DEPRESSIVE DISORDER, REMISSION STATUS UNSPECIFIED (H): ICD-10-CM

## 2022-01-11 DIAGNOSIS — J44.9 CHRONIC OBSTRUCTIVE PULMONARY DISEASE, UNSPECIFIED COPD TYPE (H): ICD-10-CM

## 2022-01-11 DIAGNOSIS — U07.1 INFECTION DUE TO 2019 NOVEL CORONAVIRUS: Primary | ICD-10-CM

## 2022-01-11 PROCEDURE — 99214 OFFICE O/P EST MOD 30 MIN: CPT | Mod: TEL | Performed by: FAMILY MEDICINE

## 2022-01-11 RX ORDER — INSULIN ASPART 100 [IU]/ML
INJECTION, SOLUTION INTRAVENOUS; SUBCUTANEOUS
Qty: 15 ML | Refills: 0 | Status: SHIPPED | OUTPATIENT
Start: 2022-01-11 | End: 2022-02-07

## 2022-01-11 NOTE — PROGRESS NOTES
Kulwant is a 52 year old who is being evaluated via a billable telephone visit.      What phone number would you like to be contacted at? [phone]  How would you like to obtain your AVS? Mail a copy      Subjective   Kulwant is a 52 year old who presents for follow up.     HPI Covid positive on 1/3/21.  Cough is improving. Wheezing is improving.  Thinks she is on her baseline as far as cough and wheezing.  Her blood sugars been high in the recent days in the 300s to 400s.  She was on NovoLog for sliding scale in the past, daughter-in-law states she does not have it anymore.  She uses Lantus 18 units daily.  No acute chest pain or shortness of breath since last visit.  She does not have any other questions today.      Review of Systems         Objective           Vitals:  No vitals were obtained today due to virtual visit.    Physical Exam   healthy, alert and no distress  PSYCH: Alert and awake, did not appear to be in distress.  Able to speak in full sentences without difficulties.  No audible cough or wheezing during telephone visit.  Her affect is normal  Remainder of exam unable to be completed due to telephone visits      Infection due to 2019 novel coronavirus  Symptoms are improving    Type 2 diabetes mellitus treated without insulin (H)  Reported blood sugar in the 300s and 400s range.  Instructed to restart NovoLog sliding scale.  Daughter-in-law will  today and start tonight.  - insulin aspart (NOVOLOG FLEXPEN) 100 UNIT/ML pen; Give before meals : For Pre-Meal Glucose: 140-189 give 1 unit, 190-239 give 2 units, 240-289 give 3 units, 290-339 give 4 units, = or >340 give 6 units.    Primary hypertension  Unable to check blood pressure.    Recurrent major depressive disorder, remission status unspecified (H)  Stable.    Chronic obstructive pulmonary disease, unspecified COPD type (H)  Appear to be back to her baseline.  Managed by pulmonology.    Phone call duration: 6  minutes

## 2022-01-14 ENCOUNTER — VIRTUAL VISIT (OUTPATIENT)
Dept: FAMILY MEDICINE | Facility: CLINIC | Age: 52
End: 2022-01-14
Payer: COMMERCIAL

## 2022-01-14 DIAGNOSIS — E11.9 TYPE 2 DIABETES MELLITUS TREATED WITH INSULIN (H): ICD-10-CM

## 2022-01-14 DIAGNOSIS — U07.1 INFECTION DUE TO 2019 NOVEL CORONAVIRUS: Primary | ICD-10-CM

## 2022-01-14 DIAGNOSIS — F33.9 RECURRENT MAJOR DEPRESSIVE DISORDER, REMISSION STATUS UNSPECIFIED (H): ICD-10-CM

## 2022-01-14 DIAGNOSIS — K08.89 TOOTHACHE: ICD-10-CM

## 2022-01-14 DIAGNOSIS — Z79.4 TYPE 2 DIABETES MELLITUS TREATED WITH INSULIN (H): ICD-10-CM

## 2022-01-14 PROCEDURE — 99214 OFFICE O/P EST MOD 30 MIN: CPT | Mod: TEL | Performed by: FAMILY MEDICINE

## 2022-01-14 NOTE — PROGRESS NOTES
"Kulwant is a 52 year old who is being evaluated via a billable telephone visit.      What phone number would you like to be contacted at? [phone]  How would you like to obtain your AVS? Mail a copy        Subjective   Kulwant is a 52 year old who presents for the following health issues  accompanied by her  and and daughter in law Alivia.    HPI  Had telephone visit last week. Was on lantus 18 unit daily, added Mealtime Novolog due to elevated BG.  Fasting BG still champ 100s to 200 and 300 per daughter in law. But patientis feeling OK.  Covid positive on 1/3/22, symptoms improving. No more cough since last week, no more fever. Breathing back to base line. Usin regular asthma meds. No chest pain or palpitations.    C/o toothache. States she broke one tooth, lower left and one is \" shaking \". Can not sleep due to pain last night. Had Dentist appt but was cancelled due to+ covid.       Review of Systems   CONSTITUTIONAL: NEGATIVE for fever, chills, change in weight  ENT/MOUTH: NEGATIVE for ear, mouth and throat problems  RESP: NEGATIVE for significant cough or SOB      Objective           Vitals: Daughter in law checked vitals during telephone visit . PALOMO 115/84, pulse 80 O2 sat 99%  No vitals were obtained today due to virtual visit.    Physical Exam   healthy, alert and no distress  PSYCH: Alert and awake; coherent speech, normal   rate and volume, able finish sentences without difficulties.   Her affect is normal  RESP: No cough, no audible wheezing, able to talk in full sentences  Remainder of exam unable to be completed due to telephone visits    Infection due to 2019 novel coronavirus  Cough, wheezing and shortness of breath improved.  Back to her baseline.    Type 2 diabetes mellitus treated with insulin (H)  Increased Lantus to 24 units.  She will continue NovoLog sliding scale.  - insulin glargine (LANTUS PEN) 100 UNIT/ML pen; Inject 24 Units Subcutaneous At Bedtime    Toothache  Advised " daughter-in-law to call her dentist and make an appointment.  - acetaminophen-codeine (TYLENOL #3) 300-30 MG tablet; Take 1 tablet by mouth every 8 hours as needed for severe pain    Recurrent major depressive disorder, remission status unspecified (H)  Stable            Phone call duration: 22  minutes

## 2022-01-15 DIAGNOSIS — Z76.0 ENCOUNTER FOR MEDICATION REFILL: Primary | ICD-10-CM

## 2022-01-15 DIAGNOSIS — K08.89 TOOTHACHE: ICD-10-CM

## 2022-01-17 NOTE — TELEPHONE ENCOUNTER
Narcotic refill not appropriate. Will discuss at follow up visit.    Dr. Adrien Cardoza  1/17/2022 2:08 PM

## 2022-01-18 VITALS
OXYGEN SATURATION: 97 % | BODY MASS INDEX: 21.71 KG/M2 | TEMPERATURE: 98.7 F | SYSTOLIC BLOOD PRESSURE: 100 MMHG | WEIGHT: 127.13 LBS | HEIGHT: 64 IN | DIASTOLIC BLOOD PRESSURE: 60 MMHG | HEART RATE: 83 BPM | RESPIRATION RATE: 28 BRPM

## 2022-01-18 VITALS
HEIGHT: 63 IN | OXYGEN SATURATION: 98 % | WEIGHT: 121 LBS | RESPIRATION RATE: 22 BRPM | HEART RATE: 81 BPM | BODY MASS INDEX: 21.44 KG/M2

## 2022-01-18 VITALS
DIASTOLIC BLOOD PRESSURE: 76 MMHG | SYSTOLIC BLOOD PRESSURE: 108 MMHG | OXYGEN SATURATION: 98 % | HEIGHT: 63 IN | WEIGHT: 131 LBS | HEART RATE: 80 BPM | BODY MASS INDEX: 23.21 KG/M2 | TEMPERATURE: 98 F

## 2022-01-18 VITALS
BODY MASS INDEX: 22.94 KG/M2 | RESPIRATION RATE: 16 BRPM | HEIGHT: 64 IN | DIASTOLIC BLOOD PRESSURE: 78 MMHG | TEMPERATURE: 98.2 F | OXYGEN SATURATION: 97 % | WEIGHT: 134.38 LBS | HEART RATE: 86 BPM | SYSTOLIC BLOOD PRESSURE: 120 MMHG

## 2022-01-18 VITALS
BODY MASS INDEX: 24.13 KG/M2 | HEART RATE: 96 BPM | DIASTOLIC BLOOD PRESSURE: 62 MMHG | OXYGEN SATURATION: 96 % | TEMPERATURE: 98.2 F | SYSTOLIC BLOOD PRESSURE: 104 MMHG | WEIGHT: 131.1 LBS | HEIGHT: 62 IN

## 2022-01-18 VITALS
HEIGHT: 63 IN | BODY MASS INDEX: 22.77 KG/M2 | SYSTOLIC BLOOD PRESSURE: 108 MMHG | HEART RATE: 76 BPM | DIASTOLIC BLOOD PRESSURE: 74 MMHG | OXYGEN SATURATION: 97 % | WEIGHT: 128.5 LBS | TEMPERATURE: 98.4 F

## 2022-01-18 VITALS
HEART RATE: 88 BPM | TEMPERATURE: 98.3 F | RESPIRATION RATE: 16 BRPM | SYSTOLIC BLOOD PRESSURE: 108 MMHG | DIASTOLIC BLOOD PRESSURE: 66 MMHG | BODY MASS INDEX: 23.88 KG/M2 | WEIGHT: 126.5 LBS | HEIGHT: 61 IN | OXYGEN SATURATION: 96 %

## 2022-01-18 VITALS
HEART RATE: 78 BPM | WEIGHT: 128 LBS | OXYGEN SATURATION: 99 % | BODY MASS INDEX: 24.19 KG/M2 | SYSTOLIC BLOOD PRESSURE: 98 MMHG | DIASTOLIC BLOOD PRESSURE: 66 MMHG | RESPIRATION RATE: 19 BRPM

## 2022-01-18 VITALS
HEART RATE: 96 BPM | DIASTOLIC BLOOD PRESSURE: 66 MMHG | HEART RATE: 95 BPM | WEIGHT: 132.5 LBS | SYSTOLIC BLOOD PRESSURE: 100 MMHG | BODY MASS INDEX: 22.62 KG/M2 | DIASTOLIC BLOOD PRESSURE: 93 MMHG | TEMPERATURE: 98.3 F | SYSTOLIC BLOOD PRESSURE: 143 MMHG | OXYGEN SATURATION: 95 % | HEIGHT: 64 IN | RESPIRATION RATE: 20 BRPM

## 2022-01-18 VITALS
RESPIRATION RATE: 20 BRPM | HEIGHT: 62 IN | OXYGEN SATURATION: 97 % | HEART RATE: 77 BPM | BODY MASS INDEX: 23.46 KG/M2 | WEIGHT: 127.5 LBS | DIASTOLIC BLOOD PRESSURE: 64 MMHG | TEMPERATURE: 98 F | SYSTOLIC BLOOD PRESSURE: 118 MMHG

## 2022-01-18 VITALS
OXYGEN SATURATION: 98 % | DIASTOLIC BLOOD PRESSURE: 80 MMHG | WEIGHT: 132.75 LBS | TEMPERATURE: 98.3 F | BODY MASS INDEX: 25.93 KG/M2 | HEART RATE: 97 BPM | SYSTOLIC BLOOD PRESSURE: 110 MMHG

## 2022-01-18 VITALS
BODY MASS INDEX: 22.53 KG/M2 | WEIGHT: 132 LBS | HEART RATE: 75 BPM | HEIGHT: 64 IN | RESPIRATION RATE: 14 BRPM | DIASTOLIC BLOOD PRESSURE: 70 MMHG | SYSTOLIC BLOOD PRESSURE: 128 MMHG

## 2022-01-18 VITALS
TEMPERATURE: 98.1 F | WEIGHT: 130.38 LBS | SYSTOLIC BLOOD PRESSURE: 108 MMHG | DIASTOLIC BLOOD PRESSURE: 68 MMHG | BODY MASS INDEX: 23.1 KG/M2 | RESPIRATION RATE: 20 BRPM | HEIGHT: 63 IN | HEART RATE: 80 BPM

## 2022-01-18 VITALS — BODY MASS INDEX: 22.86 KG/M2 | WEIGHT: 129 LBS | HEIGHT: 63 IN

## 2022-01-18 VITALS
RESPIRATION RATE: 16 BRPM | OXYGEN SATURATION: 99 % | SYSTOLIC BLOOD PRESSURE: 114 MMHG | HEIGHT: 60 IN | BODY MASS INDEX: 25.91 KG/M2 | WEIGHT: 132 LBS | HEART RATE: 67 BPM | DIASTOLIC BLOOD PRESSURE: 72 MMHG

## 2022-01-18 VITALS
BODY MASS INDEX: 23.14 KG/M2 | OXYGEN SATURATION: 97 % | HEART RATE: 99 BPM | SYSTOLIC BLOOD PRESSURE: 110 MMHG | DIASTOLIC BLOOD PRESSURE: 72 MMHG | WEIGHT: 134.8 LBS

## 2022-01-18 VITALS
SYSTOLIC BLOOD PRESSURE: 110 MMHG | DIASTOLIC BLOOD PRESSURE: 70 MMHG | HEIGHT: 64 IN | WEIGHT: 130 LBS | OXYGEN SATURATION: 98 % | HEART RATE: 86 BPM | BODY MASS INDEX: 22.2 KG/M2

## 2022-01-18 VITALS
BODY MASS INDEX: 22.2 KG/M2 | DIASTOLIC BLOOD PRESSURE: 80 MMHG | HEIGHT: 64 IN | OXYGEN SATURATION: 96 % | SYSTOLIC BLOOD PRESSURE: 98 MMHG | OXYGEN SATURATION: 98 % | HEART RATE: 87 BPM | SYSTOLIC BLOOD PRESSURE: 126 MMHG | WEIGHT: 130 LBS | TEMPERATURE: 96.8 F | HEART RATE: 76 BPM | SYSTOLIC BLOOD PRESSURE: 116 MMHG | RESPIRATION RATE: 16 BRPM | RESPIRATION RATE: 20 BRPM | HEART RATE: 80 BPM | DIASTOLIC BLOOD PRESSURE: 89 MMHG | DIASTOLIC BLOOD PRESSURE: 70 MMHG

## 2022-01-18 VITALS — HEIGHT: 64 IN | BODY MASS INDEX: 22.53 KG/M2 | WEIGHT: 132 LBS

## 2022-01-18 VITALS
HEART RATE: 94 BPM | TEMPERATURE: 98.5 F | WEIGHT: 132.12 LBS | RESPIRATION RATE: 16 BRPM | HEIGHT: 64 IN | SYSTOLIC BLOOD PRESSURE: 118 MMHG | OXYGEN SATURATION: 95 % | DIASTOLIC BLOOD PRESSURE: 60 MMHG | BODY MASS INDEX: 22.56 KG/M2

## 2022-01-18 VITALS
WEIGHT: 126.5 LBS | DIASTOLIC BLOOD PRESSURE: 70 MMHG | HEIGHT: 64 IN | WEIGHT: 129.5 LBS | DIASTOLIC BLOOD PRESSURE: 68 MMHG | TEMPERATURE: 97.9 F | SYSTOLIC BLOOD PRESSURE: 112 MMHG | BODY MASS INDEX: 22.11 KG/M2 | HEART RATE: 88 BPM | HEIGHT: 61 IN | RESPIRATION RATE: 24 BRPM | TEMPERATURE: 97.9 F | OXYGEN SATURATION: 98 % | HEART RATE: 81 BPM | BODY MASS INDEX: 23.88 KG/M2 | OXYGEN SATURATION: 98 % | SYSTOLIC BLOOD PRESSURE: 96 MMHG

## 2022-01-18 VITALS
RESPIRATION RATE: 16 BRPM | BODY MASS INDEX: 22.62 KG/M2 | OXYGEN SATURATION: 97 % | HEART RATE: 104 BPM | WEIGHT: 132.5 LBS | DIASTOLIC BLOOD PRESSURE: 72 MMHG | HEIGHT: 64 IN | SYSTOLIC BLOOD PRESSURE: 114 MMHG | TEMPERATURE: 98.1 F

## 2022-01-18 VITALS
HEIGHT: 63 IN | DIASTOLIC BLOOD PRESSURE: 60 MMHG | WEIGHT: 121 LBS | BODY MASS INDEX: 21.44 KG/M2 | OXYGEN SATURATION: 98 % | HEART RATE: 75 BPM | SYSTOLIC BLOOD PRESSURE: 110 MMHG

## 2022-01-18 VITALS
DIASTOLIC BLOOD PRESSURE: 64 MMHG | RESPIRATION RATE: 16 BRPM | SYSTOLIC BLOOD PRESSURE: 98 MMHG | HEART RATE: 99 BPM | WEIGHT: 129.06 LBS | BODY MASS INDEX: 22.03 KG/M2 | TEMPERATURE: 98.1 F | HEIGHT: 64 IN

## 2022-01-18 VITALS
DIASTOLIC BLOOD PRESSURE: 72 MMHG | WEIGHT: 131.25 LBS | HEART RATE: 108 BPM | TEMPERATURE: 97.8 F | OXYGEN SATURATION: 100 % | WEIGHT: 128 LBS | HEART RATE: 80 BPM | SYSTOLIC BLOOD PRESSURE: 110 MMHG | HEIGHT: 62 IN | DIASTOLIC BLOOD PRESSURE: 62 MMHG | SYSTOLIC BLOOD PRESSURE: 112 MMHG | BODY MASS INDEX: 23.55 KG/M2 | BODY MASS INDEX: 23.25 KG/M2 | OXYGEN SATURATION: 99 %

## 2022-01-18 VITALS
RESPIRATION RATE: 16 BRPM | HEIGHT: 64 IN | HEART RATE: 90 BPM | DIASTOLIC BLOOD PRESSURE: 76 MMHG | SYSTOLIC BLOOD PRESSURE: 118 MMHG | WEIGHT: 131 LBS | BODY MASS INDEX: 22.36 KG/M2 | TEMPERATURE: 98.3 F | OXYGEN SATURATION: 98 %

## 2022-01-18 VITALS
WEIGHT: 122.75 LBS | DIASTOLIC BLOOD PRESSURE: 68 MMHG | HEART RATE: 97 BPM | BODY MASS INDEX: 23.18 KG/M2 | OXYGEN SATURATION: 98 % | TEMPERATURE: 98.5 F | HEIGHT: 61 IN | SYSTOLIC BLOOD PRESSURE: 104 MMHG

## 2022-01-18 VITALS
WEIGHT: 133.7 LBS | DIASTOLIC BLOOD PRESSURE: 66 MMHG | BODY MASS INDEX: 22.95 KG/M2 | HEART RATE: 81 BPM | OXYGEN SATURATION: 97 % | SYSTOLIC BLOOD PRESSURE: 104 MMHG

## 2022-01-18 VITALS — HEIGHT: 63 IN | WEIGHT: 130 LBS | BODY MASS INDEX: 23.04 KG/M2

## 2022-01-18 VITALS
DIASTOLIC BLOOD PRESSURE: 82 MMHG | HEART RATE: 88 BPM | HEIGHT: 61 IN | WEIGHT: 126 LBS | BODY MASS INDEX: 23.79 KG/M2 | SYSTOLIC BLOOD PRESSURE: 120 MMHG | OXYGEN SATURATION: 98 %

## 2022-01-18 VITALS — HEART RATE: 87 BPM | OXYGEN SATURATION: 96 %

## 2022-01-18 ASSESSMENT — PATIENT HEALTH QUESTIONNAIRE - PHQ9
SUM OF ALL RESPONSES TO PHQ QUESTIONS 1-9: 10
SUM OF ALL RESPONSES TO PHQ QUESTIONS 1-9: 16

## 2022-01-20 ENCOUNTER — VIRTUAL VISIT (OUTPATIENT)
Dept: FAMILY MEDICINE | Facility: CLINIC | Age: 52
End: 2022-01-20
Payer: COMMERCIAL

## 2022-01-20 DIAGNOSIS — U07.1 INFECTION DUE TO 2019 NOVEL CORONAVIRUS: Primary | ICD-10-CM

## 2022-01-20 DIAGNOSIS — Z79.4 TYPE 2 DIABETES MELLITUS TREATED WITH INSULIN (H): ICD-10-CM

## 2022-01-20 DIAGNOSIS — J44.9 CHRONIC OBSTRUCTIVE PULMONARY DISEASE, UNSPECIFIED COPD TYPE (H): ICD-10-CM

## 2022-01-20 DIAGNOSIS — E11.9 TYPE 2 DIABETES MELLITUS TREATED WITH INSULIN (H): ICD-10-CM

## 2022-01-20 DIAGNOSIS — F33.9 RECURRENT MAJOR DEPRESSIVE DISORDER, REMISSION STATUS UNSPECIFIED (H): ICD-10-CM

## 2022-01-20 PROCEDURE — 99214 OFFICE O/P EST MOD 30 MIN: CPT | Performed by: FAMILY MEDICINE

## 2022-01-20 NOTE — PROGRESS NOTES
Kulwant is a 52 year old who is being evaluated via a billable telephone visit.      What phone number would you like to be contacted at? [phone]  How would you like to obtain your AVS? Reta        Doug Blum is a 52 year old who presents for the following health issues  accompanied by her Daughter-in-law..    HPI   The patient was tested positive with COVID-19 on January 3, 2022. She had telephone visit a week ago, temps are improving but not completely resolved at that time. Today she states she is doing much better, back to her baseline. She has history of COPD/asthma. No acute wheezing or worsening shortness of breath since last visit. Cough is improved.  She was using Lantus 18 units for diabetes, reported fasting blood sugars were in the 200s to 300 range last week. Lantus was increased to 24 units. Daughter-in-law reports fasting blood sugar in the low 100 range since increasing Lantus. She is also using NovoLog mealtime insulin. No low blood sugar with hypoglycemic symptoms reported. Patient states she is feeling well, no new concerns since last visit.  She was having tooth ache, was seen by the dentist last Friday. States she had tooth extraction and the pain is improving but still complaining of gum pain.      Review of Systems   Constitutional, HEENT, cardiovascular, pulmonary, gi and gu systems are negative, except as otherwise noted.      Objective           Vitals:  No vitals were obtained today due to virtual visit.    Physical Exam   healthy, alert and no distress  PSYCH: Alert and awake; coherent speech, normal   rate and volume, able to articulate logical thoughts, able   to abstract reason, no tangential thoughts, no hallucinations   or delusions  Her affect is normal  RESP: No cough, no audible wheezing, able to talk in full sentences  Remainder of exam unable to be completed due to telephone visits    Infection due to 2019 novel coronavirus  Acute symptoms resolved.    Chronic  Progress Note - Orthopedics   Lidya Robles 66 y o  female MRN: [id number]  Unit/Bed#: -01      Subjective:    66 y  o female POD#2 I&D Right BKA seoncdary to fluid collection  The patient states that her leg is doing well overall  Patient states that her pain has substantially decreased since her operation  Patient states that she has been compliant with antibiotic regiment at this time  The patient states she has been compliant with nonweightbearing status of the right lower extremity  Patient denies any new worsening symptoms in the right lower extremity  Patient states that she has been maintaining her back as directed  Patient denies any fevers any chills  Patient denies any shortness of breath chest tightness chest pain  Patient denies any numbness or tingling in the leg  Patient offers no other complaints at this time        Labs:  0   Lab Value Date/Time    HCT 29 4 (L) 08/13/2021 0557    HCT 29 2 (L) 08/12/2021 0621    HCT 29 9 (L) 08/10/2021 0628    HGB 9 2 (L) 08/13/2021 0557    HGB 8 8 (L) 08/12/2021 0621    HGB 9 5 (L) 08/10/2021 0628    INR 1 01 11/19/2019 1317    WBC 7 81 08/13/2021 0557    WBC 9 03 08/12/2021 0621    WBC 8 99 08/10/2021 0628       Meds:    Current Facility-Administered Medications:     acetaminophen (TYLENOL) tablet 650 mg, 650 mg, Oral, Q6H PRN, CYNDI Ohara, 650 mg at 08/12/21 0328    bisacodyl (DULCOLAX) rectal suppository 10 mg, 10 mg, Rectal, Daily PRN, CYNDI Ohara, 10 mg at 08/12/21 0950    calcium carbonate (OYSTER SHELL,OSCAL) 500 mg tablet 1 tablet, 1 tablet, Oral, BID With Meals, CYNDI Ohara, 1 tablet at 08/12/21 1655    ceFAZolin (ANCEF) IVPB (premix in dextrose) 2,000 mg 50 mL, 2,000 mg, Intravenous, Q8H, CYNDI Martinez, Last Rate: 100 mL/hr at 08/13/21 0145, 2,000 mg at 08/13/21 0145    docusate sodium (COLACE) capsule 100 mg, 100 mg, Oral, BID, CYNDI Ohara, 100 mg at 08/12/21 5075   folic acid (FOLVITE) tablet 1 mg, 1 mg, Oral, Daily, CYNDI Ohara, 1 mg at 08/12/21 0951    insulin glargine (LANTUS) subcutaneous injection 39 Units 0 39 mL, 39 Units, Subcutaneous, HS, CYNDI Ohara, 39 Units at 08/12/21 2142    insulin lispro (HumaLOG) 100 units/mL subcutaneous injection 1-5 Units, 1-5 Units, Subcutaneous, HS, CYNDI Ohara, 3 Units at 08/03/21 2333    insulin lispro (HumaLOG) 100 units/mL subcutaneous injection 1-6 Units, 1-6 Units, Subcutaneous, TID AC, 1 Units at 08/11/21 1229 **AND** Fingerstick Glucose (POCT), , , TID AC, CYNDI Oahra    insulin lispro (HumaLOG) 100 units/mL subcutaneous injection 5 Units, 5 Units, Subcutaneous, TID With Meals, CYNDI Ohara, 5 Units at 08/11/21 1712    lidocaine (LIDODERM) 5 % patch 1 patch, 1 patch, Topical, Daily, CYNDI Ohara, 1 patch at 08/12/21 0951    loperamide (IMODIUM) capsule 2 mg, 2 mg, Oral, TID PRN, CYNDI Ohara    LORazepam (ATIVAN) injection 0 5 mg, 0 5 mg, Intravenous, Once, CYNDI Ohara    nystatin (MYCOSTATIN) powder, , Topical, BID, CYNDI Ohara, Given at 08/12/21 1738    pantoprazole (PROTONIX) EC tablet 40 mg, 40 mg, Oral, Early Morning, CYNDI Ohara, 40 mg at 08/13/21 6820    polyethylene glycol (MIRALAX) packet 17 g, 17 g, Oral, Daily, CYNDI Ohara, 17 g at 08/12/21 0950    propranolol (INDERAL) tablet 20 mg, 20 mg, Oral, Q8H [facility], CYNDI Ohara, 20 mg at 08/13/21 9074    senna (SENOKOT) tablet 8 6 mg, 1 tablet, Oral, HS, CYNDI Ohara, 8 6 mg at 08/12/21 6790    Blood Culture:   No results found for: BLOODCX    Wound Culture:   No results found for: WOUNDCULT    Ins and Outs:  I/O last 24 hours:   In: 120 [P O :120]  Out: -           Physical:  Vitals:    08/13/21 0734   BP: (!) 131/48   Pulse:    Resp:    Temp: 97 8 °F (36 6 °C)   SpO2:      Musculoskeletal: obstructive pulmonary disease, unspecified COPD type (H)  Stable, at baseline.    Type 2 diabetes mellitus treated with insulin (H)  Continue Lantus 24 units and mealtime insulin. Advised to have blood sugar log separate fasting and postprandial. Follow-up in person in a few weeks.    Recurrent major depressive disorder, remission status unspecified (H)  Stable.        Phone call duration: 24  minutes   right Lower Extremity  · Patient resting comfortably in hospital bed in no acute distress  · Skin  :  No visible erythema present  No other acute visible abnormalities present  · Dressing  :  Wound VAC in place with a black sponge present covering wound site  Wound VAC functioning appropriately at this time  · TTP  :  No bony or soft tissue tenderness palpation noted at this time  · SILT s/s/sp/dp/t  +fhl/ehl, +ankle dorsi/plantar flexion  2+ DP pulse      Assessment:    66 y  o female POD#2 I&D Right BKA seoncdary to fluid collection      Plan:  · Nonweightbearing of the right lower extremity   · PT/OT for ambulation assistance, gait training  · Pain control per primary   · DVT ppx per primary  · Dispo: Ortho will follow  Plan for repeat incision and drainage of right lower extremity later today with Dr Musa Reno  Continue NPO status  Consent obtained  Cultures pending at this time         Shayy Pressley PA-C

## 2022-01-25 PROBLEM — F41.9 ANXIETY: Chronic | Status: ACTIVE | Noted: 2021-02-08

## 2022-01-25 PROBLEM — F32.9 MAJOR DEPRESSION: Chronic | Status: ACTIVE | Noted: 2020-12-11

## 2022-01-25 PROBLEM — Z79.4 TYPE 2 DIABETES MELLITUS TREATED WITH INSULIN (H): Chronic | Status: ACTIVE | Noted: 2021-02-08

## 2022-01-25 PROBLEM — Z22.7 LATENT TUBERCULOSIS: Status: RESOLVED | Noted: 2019-11-17 | Resolved: 2022-01-25

## 2022-01-25 PROBLEM — Z86.73 H/O ARTERIAL ISCHEMIC STROKE: Chronic | Status: ACTIVE | Noted: 2021-09-10

## 2022-01-25 PROBLEM — R10.13 EPIGASTRIC PAIN: Status: RESOLVED | Noted: 2021-12-15 | Resolved: 2022-01-25

## 2022-01-25 PROBLEM — J45.40 MODERATE PERSISTENT ASTHMA: Chronic | Status: ACTIVE | Noted: 2018-03-08

## 2022-01-25 PROBLEM — I25.10 CORONARY ARTERY DISEASE DUE TO LIPID RICH PLAQUE: Chronic | Status: ACTIVE | Noted: 2018-01-25

## 2022-01-25 PROBLEM — I25.83 CORONARY ARTERY DISEASE DUE TO LIPID RICH PLAQUE: Chronic | Status: ACTIVE | Noted: 2018-01-25

## 2022-01-25 PROBLEM — Z95.5 S/P CORONARY ARTERY STENT PLACEMENT: Chronic | Status: ACTIVE | Noted: 2018-02-02

## 2022-01-25 PROBLEM — J44.9 CHRONIC OBSTRUCTIVE PULMONARY DISEASE, UNSPECIFIED COPD TYPE (H): Status: RESOLVED | Noted: 2021-02-08 | Resolved: 2022-01-25

## 2022-01-25 PROBLEM — E11.9 TYPE 2 DIABETES MELLITUS TREATED WITH INSULIN (H): Chronic | Status: ACTIVE | Noted: 2021-02-08

## 2022-01-25 PROBLEM — E78.5 HYPERLIPIDEMIA: Chronic | Status: ACTIVE | Noted: 2018-11-07

## 2022-01-26 ENCOUNTER — VIRTUAL VISIT (OUTPATIENT)
Dept: SLEEP MEDICINE | Facility: CLINIC | Age: 52
End: 2022-01-26
Payer: COMMERCIAL

## 2022-01-26 VITALS — BODY MASS INDEX: 22.2 KG/M2 | WEIGHT: 130 LBS | HEIGHT: 64 IN

## 2022-01-26 DIAGNOSIS — I10 ESSENTIAL HYPERTENSION: ICD-10-CM

## 2022-01-26 DIAGNOSIS — I25.83 CORONARY ARTERY DISEASE DUE TO LIPID RICH PLAQUE: ICD-10-CM

## 2022-01-26 DIAGNOSIS — R07.9 CHEST PAIN, UNSPECIFIED TYPE: ICD-10-CM

## 2022-01-26 DIAGNOSIS — R06.00 DYSPNEA AND RESPIRATORY ABNORMALITY: ICD-10-CM

## 2022-01-26 DIAGNOSIS — I25.10 CORONARY ARTERY DISEASE DUE TO LIPID RICH PLAQUE: ICD-10-CM

## 2022-01-26 DIAGNOSIS — R53.83 MALAISE AND FATIGUE: ICD-10-CM

## 2022-01-26 DIAGNOSIS — Z72.820 LACK OF ADEQUATE SLEEP: ICD-10-CM

## 2022-01-26 DIAGNOSIS — R06.89 DYSPNEA AND RESPIRATORY ABNORMALITY: ICD-10-CM

## 2022-01-26 DIAGNOSIS — Z86.73 H/O ARTERIAL ISCHEMIC STROKE: Chronic | ICD-10-CM

## 2022-01-26 DIAGNOSIS — G47.00 INSOMNIA, UNSPECIFIED TYPE: ICD-10-CM

## 2022-01-26 DIAGNOSIS — R53.81 MALAISE AND FATIGUE: ICD-10-CM

## 2022-01-26 PROBLEM — G89.29 CHRONIC NONINTRACTABLE HEADACHE, UNSPECIFIED HEADACHE TYPE: Chronic | Status: ACTIVE | Noted: 2020-06-24

## 2022-01-26 PROBLEM — U07.1 INFECTION DUE TO 2019 NOVEL CORONAVIRUS: Status: ACTIVE | Noted: 2022-01-26

## 2022-01-26 PROBLEM — R51.9 CHRONIC NONINTRACTABLE HEADACHE, UNSPECIFIED HEADACHE TYPE: Chronic | Status: ACTIVE | Noted: 2020-06-24

## 2022-01-26 PROBLEM — J18.9 PNEUMONIA: Status: RESOLVED | Noted: 2020-08-19 | Resolved: 2022-01-26

## 2022-01-26 PROCEDURE — 99244 OFF/OP CNSLTJ NEW/EST MOD 40: CPT | Mod: 95 | Performed by: PHYSICIAN ASSISTANT

## 2022-01-26 ASSESSMENT — SLEEP AND FATIGUE QUESTIONNAIRES
HOW LIKELY ARE YOU TO NOD OFF OR FALL ASLEEP WHILE SITTING INACTIVE IN A PUBLIC PLACE: WOULD NEVER DOZE
HOW LIKELY ARE YOU TO NOD OFF OR FALL ASLEEP WHILE SITTING AND TALKING TO SOMEONE: WOULD NEVER DOZE
HOW LIKELY ARE YOU TO NOD OFF OR FALL ASLEEP WHILE LYING DOWN TO REST IN THE AFTERNOON WHEN CIRCUMSTANCES PERMIT: WOULD NEVER DOZE
HOW LIKELY ARE YOU TO NOD OFF OR FALL ASLEEP WHILE SITTING QUIETLY AFTER LUNCH WITHOUT ALCOHOL: WOULD NEVER DOZE
HOW LIKELY ARE YOU TO NOD OFF OR FALL ASLEEP WHILE SITTING AND READING: WOULD NEVER DOZE
HOW LIKELY ARE YOU TO NOD OFF OR FALL ASLEEP WHEN YOU ARE A PASSENGER IN A CAR FOR AN HOUR WITHOUT A BREAK: WOULD NEVER DOZE
HOW LIKELY ARE YOU TO NOD OFF OR FALL ASLEEP WHILE WATCHING TV: WOULD NEVER DOZE
HOW LIKELY ARE YOU TO NOD OFF OR FALL ASLEEP IN A CAR, WHILE STOPPED FOR A FEW MINUTES IN TRAFFIC: WOULD NEVER DOZE

## 2022-01-26 ASSESSMENT — PAIN SCALES - GENERAL: PAINLEVEL: MODERATE PAIN (4)

## 2022-01-26 ASSESSMENT — MIFFLIN-ST. JEOR: SCORE: 1174.68

## 2022-01-26 NOTE — PATIENT INSTRUCTIONS
Your BMI is Body mass index is 22.31 kg/m .  Weight management is a personal decision.  If you are interested in exploring weight loss strategies, the following discussion covers the approaches that may be successful. Body mass index (BMI) is one way to tell whether you are at a healthy weight, overweight, or obese. It measures your weight in relation to your height.  A BMI of 18.5 to 24.9 is in the healthy range. A person with a BMI of 25 to 29.9 is considered overweight, and someone with a BMI of 30 or greater is considered obese. More than two-thirds of American adults are considered overweight or obese.  Being overweight or obese increases the risk for further weight gain. Excess weight may lead to heart disease and diabetes.  Creating and following plans for healthy eating and physical activity may help you improve your health.  Weight control is part of healthy lifestyle and includes exercise, emotional health, and healthy eating habits. Careful eating habits lifelong are the mainstay of weight control. Though there are significant health benefits from weight loss, long-term weight loss with diet alone may be very difficult to achieve- studies show long-term success with dietary management in less than 10% of people. Attaining a healthy weight may be especially difficult to achieve in those with severe obesity. In some cases, medications, devices and surgical management might be considered.  What can you do?  If you are overweight or obese and are interested in methods for weight loss, you should discuss this with your provider.     Consider reducing daily calorie intake by 500 calories.     Keep a food journal.     Avoiding skipping meals, consider cutting portions instead.    Diet combined with exercise helps maintain muscle while optimizing fat loss. Strength training is particularly important for building and maintaining muscle mass. Exercise helps reduce stress, increase energy, and improves fitness.  Increasing exercise without diet control, however, may not burn enough calories to loose weight.       Start walking three days a week 10-20 minutes at a time    Work towards walking thirty minutes five days a week     Eventually, increase the speed of your walking for 1-2 minutes at time    And look into health and wellness programs that may be available through your health insurance provider, employer, local community center, or [employer].

## 2022-01-26 NOTE — PROGRESS NOTES
Kulwant is a 52 year old who is being evaluated via a billable video visit.      How would you like to obtain your AVS? Mail a copy  If the video visit is dropped, the invitation should be resent by:   Will anyone else be joining your video visit?       Video Start Time: 1:03 PM  Video-Visit Details    Type of service:  Video Visit    Video End Time:1:32 PM    Originating Location (pt. Location): Home    Distant Location (provider location):  [facility]     Platform used for Video Visit: [employer]       Sleep Consultation:    Date on this visit: 1/26/2022    Kulwant Amin is sent by Ja Kramer for a sleep consultation regarding snoring and sleep apnea.    Primary Physician: Adrien Cardoza     CC: sleep apnea    Kulwant Amin is a 52 year old female with medical history remarkable for CAD, HTN, CVA, diabetes type 2, hyperlipidemia, COPD, asthma, insomnia, headaches, depression and anxiety. Interview conducted via [employer] [place] .  Patient is a poor historian.    Kulwant goes to sleep at 10:00 PM.  She wakes up between 4 and 6 AM  without an alarm.  Sleep latency varies greatly. She takes Amitriptyline 20 mg at bedtime. Patient does not know the amount of sleep she gets per night. She does not feel rested.    Patient does not use electronics in bed and watch TV in bed.     Kulwant does not do shift work.     Kulwant does snore every night and snoring is very loud. Patient does not have a regular bed partner. There is report of snoring.  She does not have witnessed apneas.  Patient sleeps on her back and side. She denies no morning headaches, morning confusion and restless legs. Kulwant denies any bruxism, sleep walking, sleep talking, dream enactment, sleep paralysis, cataplexy and hypnogogic/hypnopompic hallucinations.    Kulwant denies difficulty breathing through her nose.      Kulwant has gained 0-5 pounds in the last year. Patient's [place] Sleepiness score 0/24 inconsistent with excessive daytime  sleepiness.  He has fatigue.     Kulwant does not nap. She takes some inadvertant naps.  She does not drive.  She uses no caffeine.     Allergies:    No Known Allergies    Medications:    Current Outpatient Medications   Medication Sig Dispense Refill     acetaminophen (TYLENOL) 500 MG tablet Take 2 tablets (1,000 mg) by mouth every 8 hours 100 tablet 0     albuterol (PROAIR HFA/PROVENTIL HFA/VENTOLIN HFA) 108 (90 Base) MCG/ACT inhaler Inhale 2 puffs into the lungs every 4 hours as needed for shortness of breath / dyspnea 4 g 11     albuterol (PROVENTIL) (2.5 MG/3ML) 0.083% neb solution Inhale 2.5 mg into the lungs       amitriptyline (ELAVIL) 10 MG tablet 2 tablets At Bedtime       aspirin (ASA) 325 MG tablet Take 325 mg by mouth daily       ASPIRIN LOW DOSE 81 MG EC tablet Take 81 mg by mouth daily        atorvastatin (LIPITOR) 80 MG tablet TAKE 1 TABLET (80 MG TOTAL) BY MOUTH AT BEDTIME FOR CHOLESTEROL       B-D U/F 31G X 8 MM insulin pen needle USE ONE PEN NEEDLE DAILY AS NEEDED FOR  each 1     clopidogrel (PLAVIX) 75 MG tablet Take 1 tablet (75 mg) by mouth daily 90 tablet 3     colchicine (COLCYRS) 0.6 MG tablet TAKE 1 TABLET (0.6 MG TOTAL) BY MOUTH DAILY       Continuous Blood Gluc  (FREESTYLE MONICA 14 DAY READER) MICHAEL        Continuous Blood Gluc Sensor (FREESTYLE MONICA 14 DAY SENSOR) MISC USE 1 UNITS AS DIRECTED EVERY 14 (FOURTEEN) DAYS. 2 each 3     CONTOUR NEXT TEST test strip USE 1 EACH AS DIRECTED 3 (THREE) TIMES A DAY.       cyclobenzaprine (FLEXERIL) 10 MG tablet TAKE 1 TABLET (10 MG TOTAL) BY MOUTH 3 (THREE) TIMES A DAY AS NEEDED FOR MUSCLE SPASMS.       diclofenac (VOLTAREN) 1 % topical gel Apply 2 g topically 4 times daily 50 g 1     Dupilumab (DUPIXENT) 200 MG/1.14ML SOSY Inject 200 mg Subcutaneous every 14 days 1.14 mL 6     ferrous sulfate (FEROSUL) 325 (65 Fe) MG tablet Take 325 mg by mouth       fluticasone-vilanterol (BREO ELLIPTA) 200-25 MCG/INH inhaler Inhale 1 puff into the  lungs daily 60 each 11     gabapentin (NEURONTIN) 300 MG capsule Take 2 capsules (600 mg) by mouth 3 times daily 180 capsule 3     Global Inject Ease Lancets 30G MISC        guaiFENesin (ROBITUSSIN) 100 MG/5ML liquid Take 10 mLs (200 mg) by mouth every 4 hours as needed for cough 200 mL 1     guaiFENesin-codeine (ROBITUSSIN AC) 100-10 MG/5ML solution Take 5-10 mLs by mouth every 4 hours as needed for cough 240 mL 0     hydrochlorothiazide (MICROZIDE) 12.5 MG capsule TAKE 1 CAPSULE (12.5 MG TOTAL) BY MOUTH DAILY FOR BLOOD PRESSURE 90 capsule 2     hydrocortisone (CORTAID) 1 % external cream Apply topically 2 times daily 60 g 0     insulin aspart (NOVOLOG FLEXPEN) 100 UNIT/ML pen Give before meals : For Pre-Meal Glucose: 140-189 give 1 unit, 190-239 give 2 units, 240-289 give 3 units, 290-339 give 4 units, = or >340 give 6 units. 15 mL 0     insulin glargine (LANTUS PEN) 100 UNIT/ML pen Inject 24 Units Subcutaneous At Bedtime 15 mL 3     insulin pen needle (32G X 4 MM) 32G X 4 MM miscellaneous Use one pen needles daily or as directed. 200 each 11     ipratropium - albuterol 0.5 mg/2.5 mg/3 mL (DUONEB) 0.5-2.5 (3) MG/3ML neb solution Take 1 vial (3 mLs) by nebulization every 6 hours as needed for shortness of breath / dyspnea or wheezing 240 mL 11     meclizine (ANTIVERT) 25 MG tablet Take 25 mg by mouth daily as needed        metFORMIN (GLUCOPHAGE-XR) 500 MG 24 hr tablet Take 1 tablet (500 mg) by mouth daily (with dinner) 90 tablet 1     metoprolol tartrate (LOPRESSOR) 25 MG tablet TAKE 1 TABLET (25 MG TOTAL) BY MOUTH 2 (TWO) TIMES A DAY FOR BLOOD PRESSURE       montelukast (SINGULAIR) 10 MG tablet Take 1 tablet (10 mg) by mouth At Bedtime 30 tablet 6     omeprazole (PRILOSEC) 40 MG DR capsule Take 1 capsule (40 mg) by mouth daily 90 capsule 3     ondansetron (ZOFRAN) 4 MG tablet Take 1 tablet (4 mg) by mouth every 8 hours as needed for nausea 30 tablet 1     polyethylene glycol (MIRALAX) powder Take 17 g by mouth  "daily 510 g 1     polyvinyl alcohol (ARTIFICIAL TEARS) 1.4 % ophthalmic solution Place 1 drop into both eyes as needed for dry eyes 15 mL 3     predniSONE (DELTASONE) 2.5 MG tablet TAKE 2 TABLETS (5 MG) BY MOUTH DAILY       sucralfate (CARAFATE) 1 GM tablet TAKE 1 TABLET (1 G TOTAL) BY MOUTH 4 (FOUR) TIMES A DAY BEFORE MEALS AND AT BEDTIME. TAKE 1 HOUR PRIOR TO MEALS 120 tablet 11     tiotropium (SPIRIVA RESPIMAT) 2.5 MCG/ACT inhalation aerosol Inhale 2 puffs into the lungs daily 4 g 1     furosemide (LASIX) 20 MG tablet Take 1 tablet (20 mg) by mouth daily for 10 days 10 tablet 1       Problem List:  Patient Active Problem List    Diagnosis Date Noted     Constipation 12/15/2021     Priority: Medium     Dysphagia 12/15/2021     Priority: Medium     Heartburn 12/15/2021     Priority: Medium     Chronic abdominal pain 12/15/2021     Priority: Medium     H/O arterial ischemic stroke 09/10/2021     Priority: Medium     Formatting of this note might be different from the original.  Right pontine- Jan 2021       Anxiety 02/08/2021     Priority: Medium     Dizziness 02/08/2021     Priority: Medium     Last Assessment & Plan:   Started Yesterday 11/29  Worse today     New issue    Like \"like whole body spinning\"    Plavix for blood thinner  Stopped for procedure on 11/26  Stent put in 6/2018 on Aspirin     Worse:  Precipitates Constant ; If she turns head: like the same no matter what    Better: Nothing  Migraine: history of Migraine but no part of migraine  Has Asthma usually cough; No covid   + Nausea  + Sweaty (but usual)  Fever? No   Taste OK ? Yes   Smell OK ? Yes   Hearing OK   Muffled sensation YEs sometimes  Ringing? Yes both sides     Dizziness  Constant   Spinning Whole     Vertigo   Meniere  Labyrinthitis  TIA / CVA     Meclizine 25 mg Three times   Ondansetron 4 mg Q8 hours      MRI   No exposure    Last Assessment & Plan:   Formatting of this note might be different from the original.  Started Yesterday " "11/29  Worse today     New issue    Like \"like whole body spinning\"    Plavix for blood thinner  Stopped for procedure on 11/26  Stent put in 6/2018 on Aspirin     Worse:  Precipitates Constant ; If she turns head: like the same no matter what    Better: Nothing  Migraine: history of Migraine but no part of migraine  Has Asthma usually cough; No covid   + Nausea  + Sweaty (but usual)  Fever? No   Taste OK ? Yes   Smell OK ? Yes   Hearing OK   Muffled sensation YEs sometimes  Ringing? Yes both sides     Dizziness  Constant   Spinning Whole     Vertigo   Meniere  Labyrinthitis  TIA / CVA     Meclizine 25 mg Three times   Ondansetron 4 mg Q8 hours      MRI   No exposure       Type 2 diabetes mellitus treated with insulin (H) 02/08/2021     Priority: Medium     Moderate major depression (H) 12/11/2020     Priority: Medium     Pneumonia 08/19/2020     Priority: Medium     Chronic nonintractable headache, unspecified headache type 06/24/2020     Priority: Medium     Microcytic anemia 11/17/2019     Priority: Medium     SOB (shortness of breath) 11/16/2019     Priority: Medium     Hyperlipidemia 11/07/2018     Priority: Medium     Migraine headache 03/08/2018     Priority: Medium     Moderate persistent asthma 03/08/2018     Priority: Medium     S/P coronary artery stent placement 02/02/2018     Priority: Medium     Coronary artery disease due to lipid rich plaque 01/25/2018     Priority: Medium     GERD (gastroesophageal reflux disease) 12/15/2016     Priority: Medium     Cataracts, bilateral 06/03/2015     Priority: Medium     Corneal opacity 06/03/2015     Priority: Medium     Problem list name updated by automated process. Provider to review       Irregular astigmatism 06/03/2015     Priority: Medium     Hyperthyroidism 02/05/2015     Priority: Medium     HTN (hypertension) 09/02/2014     Priority: Medium     Esophageal reflux (GERD) 09/02/2014     Priority: Medium     TB lung, latent 08/17/2013     Priority: Medium    "  INH for 9 months       COPD (chronic obstructive pulmonary disease)/Asthma  08/17/2013     Priority: Medium     Unclear dx; being f/u by pulm       Pulmonary nodule, right 06/12/2013     Priority: Medium     4 mm in size.       Environmental allergies 06/12/2013     Priority: Medium     Allergic to dust mite and cockroach.        Nonspecific reaction to tuberculin skin test without active tuberculosis 05/16/2012     Priority: Medium     Overview:   Kulwant Amin  is a newly arrived refugee who was had a positive TST and/or IGRA Tspot and normal chest x-ray. She has no s/s of tuberculosis and no known recent contacts. Kulwant was started on treatment for latent tuberculosis infection (LTBI) with refills at the [facility]. Baseline ALT was normal.       Nonspecific (abnormal) findings on radiological and other examination of other intrathoracic organs 04/17/2012     Priority: Medium     Unspecified visual loss 04/17/2012     Priority: Medium     Dental caries 04/17/2012     Priority: Medium        Past Medical/Surgical History:  Past Medical History:   Diagnosis Date     Acute asthma exacerbation 1/6/2020     Anxiety      Arthritis      Asthma exacerbation 11/19/2015     Chronic obstructive pulmonary disease, unspecified COPD type (H) 2/8/2021     COPD (chronic obstructive pulmonary disease) (H)      Coronary artery disease due to lipid rich plaque 1/25/2018     Depression      Dyspnea 4/26/2013     Epigastric pain 12/15/2021     Essential hypertension 4/14/2017     GERD (gastroesophageal reflux disease)      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     Lower GI bleeding      Pneumonia      TB lung, latent     9 mos INH     Past Surgical History:   Procedure Laterality Date     CORONARY STENT PLACEMENT  2018     CV CORONARY ANGIOGRAM N/A 1/25/2018    Procedure: Coronary Angiogram;  Surgeon: Angelo Serrano MD;  Location: [facility];  Service:      TN ESOPHAGOGASTRODUODENOSCOPY TRANSORAL DIAGNOSTIC  N/A 12/10/2018    Procedure: ESOPHAGOGASTRODUODENOSCOPY (EGD);  Surgeon: Eddie Renteria MD;  Location: [facility];  Service: Gastroenterology     KS ESOPHAGOGASTRODUODENOSCOPY TRANSORAL DIAGNOSTIC N/A 12/3/2020    Procedure: ESOPHAGOGASTRODUODENOSCOPY (EGD) with biospies ;  Surgeon: Avi Crow MD;  Location: [facility];  Service: Gastroenterology     [facility]/10/2018    Procedure: COLONOSCOPY with polypectomy using biopsy forceps;  Surgeon: Eddie Renteria MD;  Location: [facility];  Service: Gastroenterology       Social History:  Social History     Socioeconomic History     Marital status:      Spouse name: Not on file     Number of children: Not on file     Years of education: Not on file     Highest education level: Not on file   Occupational History     Not on file   Tobacco Use     Smoking status: Former Smoker     Packs/day: 1.00     Years: 30.00     Pack years: 30.00     Types: Cigarettes, Cigarettes, Cigarettes     Quit date: 2003     Years since quittin.0     Smokeless tobacco: Never Used     Tobacco comment: No passive exposure   Substance and Sexual Activity     Alcohol use: No     Drug use: No     Sexual activity: Yes     Partners: Male   Other Topics Concern     Parent/sibling w/ CABG, MI or angioplasty before 65F 55M? Not Asked   Social History Narrative    2017 The patient lives with her daughter-in-law (who is present), , son, and 2 grandchildren (total of 6 people). Immigrant.     Social Determinants of Health     Financial Resource Strain: Not on file   Food Insecurity: Not on file   Transportation Needs: Not on file   Physical Activity: Not on file   Stress: Not on file   Social Connections: Not on file   Intimate Partner Violence: Not on file   Housing Stability: Not on file       Family History:  Family History   Problem Relation Age of Onset     Other - See Comments Mother          of an intestinal problem      "Ulcers Father          of gastritis     Breast Cancer No family hx of        Review of Systems:  A complete review of systems reviewed by me is negative with the exeption of what has been mentioned in the history of present illness.  CONSTITUTIONAL: NEGATIVE for weight gain/loss, fever, chills, sweats or night sweats, drug allergies.  EYES: NEGATIVE for changes in vision, blind spots, double vision.  ENT: NEGATIVE for ear pain, sore throat, sinus pain, post-nasal drip, runny nose, bloody nose  CARDIAC: NEGATIVE for fast heartbeats or fluttering in chest, chest pain or pressure, breathlessness when lying flat, swollen legs or swollen feet.  NEUROLOGIC: NEGATIVE headaches, weakness or numbness in the arms or legs.  DERMATOLOGIC: NEGATIVE for rashes, new moles or change in mole(s)  PULMONARY:  POSITIVE for  SOB at rest, SOB with activity and dry cough  GASTROINTESTINAL: NEGATIVE for nausea or vomitting, loose or watery stools, fat or grease in stools, constipation, abdominal pain, bowel movements black in color or blood noted.  GENITOURINARY: NEGATIVE for pain during urination, blood in urine, urinating more frequently than usual, irregular menstrual periods.  MUSCULOSKELETAL: NEGATIVE for muscle pain, bone or joint pain, swollen joints.  ENDOCRINE: NEGATIVE for increased thirst or urination, diabetes.  LYMPHATIC: NEGATIVE for swollen lymph nodes, lumps or bumps in the breasts or nipple discharge.    Physical Examination:  Vitals: Ht 1.626 m (5' 4\")   Wt 59 kg (130 lb)   BMI 22.31 kg/m    BMI= Body mass index is 22.31 kg/m .         [place] Total Score 2022   Total score - [place] 0       AMY Total Score: 24 (22 1241)    GENERAL APPEARANCE: alert and no distress  EYES: Eyes grossly normal to inspection  HENT: oropharynx crowded and poor dentition.   NECK: no asymmetry, masses, or scars  RESP: breathing is non-labored  NEURO: mentation intact and speech normal  PSYCH: mentation appears normal and affect " normal/bright  Mallampati Class: III.  Tonsillar Stage:     Last Comprehensive Metabolic Panel:  Sodium   Date Value Ref Range Status   01/03/2022 138 136 - 145 mmol/L Final   06/18/2015 138.3 133.0 - 144.0 mmol/L Final     Potassium   Date Value Ref Range Status   01/03/2022 3.5 3.5 - 5.0 mmol/L Final   06/18/2015 3.7 3.4 - 5.3 mmol/dL Final     Chloride   Date Value Ref Range Status   01/03/2022 103 98 - 107 mmol/L Final   06/18/2015 102.2 98.0 - 110.0 mmol/L Final     Carbon Dioxide   Date Value Ref Range Status   06/18/2015 25.1 20.0 - 32.0 mmol/L Final     Carbon Dioxide (CO2)   Date Value Ref Range Status   01/03/2022 24 22 - 31 mmol/L Final     Anion Gap   Date Value Ref Range Status   01/03/2022 11 5 - 18 mmol/L Final   01/26/2015 7 3 - 14 mmol/L Final     Glucose   Date Value Ref Range Status   01/03/2022 161 (H) 70 - 125 mg/dL Final   06/18/2015 113.7 (H) 70.0 - 99.0 mg'dL Final     Urea Nitrogen   Date Value Ref Range Status   01/03/2022 12 8 - 22 mg/dL Final   06/18/2015 14.1 7.0 - 19.0 mg/dL Final     Creatinine   Date Value Ref Range Status   01/03/2022 0.67 0.60 - 1.10 mg/dL Final   06/18/2015 0.7 0.5 - 1.0 mg/dL Final     GFR Estimate   Date Value Ref Range Status   01/03/2022 >90 >60 mL/min/1.73m2 Final     Comment:     Effective December 21, 2021 eGFRcr in adults is calculated using the 2021 CKD-EPI creatinine equation which includes age and gender (Zeus et al., NEJM, DOI: 10.1056/ETPKft8791664)   06/18/2021 >60 >60 mL/min/1.73m2 Final   06/18/2015 95.3 mL/min/1.7 m2 Final     Calcium   Date Value Ref Range Status   01/03/2022 9.6 8.5 - 10.5 mg/dL Final   06/18/2015 9.7 8.5 - 10.1 mg/dL Final       Impression/Plan:  Patient has features and risk factors for possible obstructive sleep apnea including: loud snoring, non-refreshing sleep, daytime fatigue (ESS 0),  crowded oropharynx and co-morbid HTN, CAD DM type 2 and CVA. The STOP-BANG score is 4/8.  The pathophysiology, diagnosis and treatment of  FLACO was discussed. Recommend Polysomnogram (using 4% desaturation/Medicare/ AASM 1B scoring rules) for intermediate risk  obstructive sleep apnea. She will take her usual Elavil on the night of the sleep test.  Chronic insomnia, likely due to a variety of factors. Co-occurring anxiety and depression identified and insomnia might be a secondary symptom.  discussed sleep hygiene and stimulus control.    Literature provided regarding sleep apnea.      She will follow up with me in approximately two weeks after her sleep study has been competed to review the results and discuss plan of care.       Polysomnography reviewed.  Obstructive sleep apnea reviewed.  Complications of untreated sleep apnea were reviewed.    Janette Velazco PA-C    CC: Ja Kramer

## 2022-02-04 ENCOUNTER — VIRTUAL VISIT (OUTPATIENT)
Dept: FAMILY MEDICINE | Facility: CLINIC | Age: 52
End: 2022-02-04
Payer: COMMERCIAL

## 2022-02-04 VITALS
WEIGHT: 131.13 LBS | DIASTOLIC BLOOD PRESSURE: 86 MMHG | SYSTOLIC BLOOD PRESSURE: 110 MMHG | HEART RATE: 93 BPM | TEMPERATURE: 98.3 F | HEIGHT: 64 IN | BODY MASS INDEX: 22.39 KG/M2 | RESPIRATION RATE: 16 BRPM | OXYGEN SATURATION: 97 %

## 2022-02-04 DIAGNOSIS — E11.9 TYPE 2 DIABETES MELLITUS TREATED WITH INSULIN (H): Primary | Chronic | ICD-10-CM

## 2022-02-04 DIAGNOSIS — Z95.5 S/P CORONARY ARTERY STENT PLACEMENT: Chronic | ICD-10-CM

## 2022-02-04 DIAGNOSIS — I10 PRIMARY HYPERTENSION: Chronic | ICD-10-CM

## 2022-02-04 DIAGNOSIS — R06.83 SNORES: ICD-10-CM

## 2022-02-04 DIAGNOSIS — Z79.4 TYPE 2 DIABETES MELLITUS TREATED WITH INSULIN (H): Primary | Chronic | ICD-10-CM

## 2022-02-04 DIAGNOSIS — Z23 HIGH PRIORITY FOR 2019-NCOV VACCINE: ICD-10-CM

## 2022-02-04 DIAGNOSIS — F33.9 RECURRENT MAJOR DEPRESSIVE DISORDER, REMISSION STATUS UNSPECIFIED (H): Chronic | ICD-10-CM

## 2022-02-04 PROCEDURE — 0064A COVID-19,PF,MODERNA (18+ YRS BOOSTER .25ML): CPT | Performed by: FAMILY MEDICINE

## 2022-02-04 PROCEDURE — 91306 COVID-19,PF,MODERNA (18+ YRS BOOSTER .25ML): CPT | Performed by: FAMILY MEDICINE

## 2022-02-04 PROCEDURE — 99214 OFFICE O/P EST MOD 30 MIN: CPT | Performed by: FAMILY MEDICINE

## 2022-02-04 ASSESSMENT — MIFFLIN-ST. JEOR: SCORE: 1179.78

## 2022-02-04 NOTE — PROGRESS NOTES
ASSESMENT AND PLAN:    Type 2 diabetes mellitus treated with insulin (H)  Currently using Lantus 24 units at night.  Evening postprandial blood sugar in the 400s most days.  We will change Lantus to 10 units in the morning and 20 units at night.  Continue Metformin current dose.  Continue NovoLog sliding scale with no changes.    Primary hypertension  Controlled    Recurrent major depressive disorder, remission status unspecified (H)  Stable    S/P coronary artery stent placement  Followed by Cardiology.  Recently referred to sleep medicine, recommended sleep study.  Daughter-in-law states they have not received a call yet, advised to call them to schedule.  Contact number given.    Elizabeth  Pending sleep study.    High priority for 2019-nCoV vaccine  - COVID-19,PF,MODERNA (18+ Yrs BOOSTER .25mL)    This transcription uses voice recognition software, which may contain typographical errors.      SUBJECTIVE: Kulwant Amin is a 52-year-old female with multiple medical conditions including but not limited to uncontrolled asthma, uncontrolled diabetes, depression, hypertension and recurrent atypical chest pain followed by cardiology.  She is here to follow-up on her diabetes.  She sees pulmonology for asthma.  She was recently seen by sleep medicine, was told to get sleep study to rule out sleep apnea.  She was referred to sleep medicine cardiology.  She is currently using Lantus 24 units at night and NovoLog sliding scale at mealtimes.  Her fasting blood sugar for the past few days are 199, 137, 131, 194, 119 and 150.  Evening blood sugar after dinner are 230, 527, 360, 306, 418, 436, 376, 470.  She is complaining of dizziness and headache but these symptoms are not new.  No acute chest pain.  Breathing is at her baseline.  She would like to get her Covid vaccine booster too.     Past Medical History:   Diagnosis Date     Acute asthma exacerbation 1/6/2020     Anxiety      Arthritis      Asthma exacerbation 11/19/2015      Chronic obstructive pulmonary disease, unspecified COPD type (H) 2/8/2021     COPD (chronic obstructive pulmonary disease) (H)      Coronary artery disease due to lipid rich plaque 1/25/2018     Depression      Dyspnea 4/26/2013     Epigastric pain 12/15/2021     Essential hypertension 4/14/2017     GERD (gastroesophageal reflux disease)      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     Lower GI bleeding      Nonspecific (abnormal) findings on radiological and other examination of other intrathoracic organs 4/17/2012     Pneumonia      TB lung, latent     9 mos INH     Patient Active Problem List   Diagnosis     Pulmonary nodule, right     Environmental allergies     TB lung, latent     COPD (chronic obstructive pulmonary disease)/Asthma      HTN (hypertension)     Esophageal reflux (GERD)     Hyperthyroidism     Cataracts, bilateral     Corneal opacity     Irregular astigmatism     Anxiety     Chronic nonintractable headache, unspecified headache type     Coronary artery disease due to lipid rich plaque     Dizziness     Hyperlipidemia     Microcytic anemia     Moderate major depression (H)     Moderate persistent asthma     Nonspecific reaction to tuberculin skin test without active tuberculosis     Type 2 diabetes mellitus treated with insulin (H)     Unspecified visual loss     SOB (shortness of breath)     GERD (gastroesophageal reflux disease)     Dental caries     S/P coronary artery stent placement     H/O arterial ischemic stroke     Constipation     Dysphagia     Heartburn     Chronic abdominal pain     Infection due to 2019 novel coronavirus     Insomnia       Allergies:  No Known Allergies    History   Smoking Status     Former Smoker     Packs/day: 1.00     Years: 30.00     Types: Cigarettes, Cigarettes, Cigarettes     Quit date: 1/1/2003   Smokeless Tobacco     Never Used     Comment: No passive exposure       Review of systems otherwise negative except as listed in HPI.   History   Smoking Status     Former  Smoker     Packs/day: 1.00     Years: 30.00     Types: Cigarettes, Cigarettes, Cigarettes     Quit date: 1/1/2003   Smokeless Tobacco     Never Used     Comment: No passive exposure       OBJECTICE: There were no vitals taken for this visit.    GEN-alert,  in no apparent distress.  HEENT- neck is supple.  CV-regular rate and rhythm with no murmur.   RESP-no wheezing today.  No crackles or rhonchi  ABDOMEN- Soft , not tender.  EXTREM- No edema.  No open lesions or ulcers.  Sensation intact.          Adrien Cardoza MD   2/4/2022

## 2022-02-04 NOTE — PROGRESS NOTES
"Kulwant is a 52 year old who is being evaluated via a billable telephone visit.      What phone number would you like to be contacted at? [phone]  How would you like to obtain your AVS? Mail a copy        Subjective   Kulwant is a 52 year old who presents for the following health issues  accompanied by her daughter in law.    HPI       {additonal problems for provider to add (Optional):372036}    Review of Systems   {ROS COMP (Optional):000448}      Objective           Vitals:  No vitals were obtained today due to virtual visit.    Physical Exam   {GENERAL APPEARANCE:50::\"healthy\",\"alert\",\"no distress\"}  PSYCH: Alert and oriented times 3; coherent speech, normal   rate and volume, able to articulate logical thoughts, able   to abstract reason, no tangential thoughts, no hallucinations   or delusions  Her affect is { :2703355::\"normal\"}  RESP: No cough, no audible wheezing, able to talk in full sentences  Remainder of exam unable to be completed due to telephone visits    {Diagnostic Test Results (Optional):161232}    {AMBULATORY ATTESTATION (Optional):611181}        Phone call duration: *** minutes    "

## 2022-02-07 ENCOUNTER — TELEPHONE (OUTPATIENT)
Dept: SLEEP MEDICINE | Facility: CLINIC | Age: 52
End: 2022-02-07

## 2022-02-07 ENCOUNTER — LAB (OUTPATIENT)
Dept: LAB | Facility: HOSPITAL | Age: 52
End: 2022-02-07
Payer: COMMERCIAL

## 2022-02-07 ENCOUNTER — OFFICE VISIT (OUTPATIENT)
Dept: PULMONOLOGY | Facility: OTHER | Age: 52
End: 2022-02-07
Payer: COMMERCIAL

## 2022-02-07 VITALS
OXYGEN SATURATION: 96 % | BODY MASS INDEX: 22.66 KG/M2 | HEART RATE: 68 BPM | SYSTOLIC BLOOD PRESSURE: 112 MMHG | RESPIRATION RATE: 18 BRPM | WEIGHT: 132 LBS | DIASTOLIC BLOOD PRESSURE: 62 MMHG

## 2022-02-07 DIAGNOSIS — D72.10 EOSINOPHILIA, UNSPECIFIED TYPE: ICD-10-CM

## 2022-02-07 DIAGNOSIS — E11.9 TYPE 2 DIABETES MELLITUS TREATED WITHOUT INSULIN (H): ICD-10-CM

## 2022-02-07 DIAGNOSIS — D72.10 EOSINOPHILIA, UNSPECIFIED TYPE: Primary | ICD-10-CM

## 2022-02-07 DIAGNOSIS — Z76.0 ENCOUNTER FOR MEDICATION REFILL: Primary | ICD-10-CM

## 2022-02-07 LAB
BASOPHILS # BLD AUTO: 0.1 10E3/UL (ref 0–0.2)
BASOPHILS NFR BLD AUTO: 1 %
EOSINOPHIL # BLD AUTO: 0.5 10E3/UL (ref 0–0.7)
EOSINOPHIL NFR BLD AUTO: 7 %
ERYTHROCYTE [DISTWIDTH] IN BLOOD BY AUTOMATED COUNT: 15.1 % (ref 10–15)
HCT VFR BLD AUTO: 38.3 % (ref 35–47)
HGB BLD-MCNC: 12.1 G/DL (ref 11.7–15.7)
IMM GRANULOCYTES # BLD: 0 10E3/UL
IMM GRANULOCYTES NFR BLD: 0 %
LYMPHOCYTES # BLD AUTO: 1.5 10E3/UL (ref 0.8–5.3)
LYMPHOCYTES NFR BLD AUTO: 20 %
MCH RBC QN AUTO: 27.4 PG (ref 26.5–33)
MCHC RBC AUTO-ENTMCNC: 31.6 G/DL (ref 31.5–36.5)
MCV RBC AUTO: 87 FL (ref 78–100)
MONOCYTES # BLD AUTO: 0.5 10E3/UL (ref 0–1.3)
MONOCYTES NFR BLD AUTO: 6 %
NEUTROPHILS # BLD AUTO: 4.9 10E3/UL (ref 1.6–8.3)
NEUTROPHILS NFR BLD AUTO: 66 %
NRBC # BLD AUTO: 0 10E3/UL
NRBC BLD AUTO-RTO: 0 /100
PLATELET # BLD AUTO: 169 10E3/UL (ref 150–450)
RBC # BLD AUTO: 4.42 10E6/UL (ref 3.8–5.2)
RETICS # AUTO: 0.09 10E6/UL (ref 0.01–0.11)
RETICS/RBC NFR AUTO: 2.1 % (ref 0.8–2.7)
WBC # BLD AUTO: 7.4 10E3/UL (ref 4–11)

## 2022-02-07 PROCEDURE — 36415 COLL VENOUS BLD VENIPUNCTURE: CPT

## 2022-02-07 PROCEDURE — 86038 ANTINUCLEAR ANTIBODIES: CPT

## 2022-02-07 PROCEDURE — 99215 OFFICE O/P EST HI 40 MIN: CPT | Performed by: INTERNAL MEDICINE

## 2022-02-07 PROCEDURE — 85045 AUTOMATED RETICULOCYTE COUNT: CPT

## 2022-02-07 PROCEDURE — 86682 HELMINTH ANTIBODY: CPT

## 2022-02-07 PROCEDURE — 86606 ASPERGILLUS ANTIBODY: CPT

## 2022-02-07 PROCEDURE — 86036 ANCA SCREEN EACH ANTIBODY: CPT

## 2022-02-07 PROCEDURE — 85025 COMPLETE CBC W/AUTO DIFF WBC: CPT

## 2022-02-07 RX ORDER — INSULIN ASPART 100 [IU]/ML
INJECTION, SOLUTION INTRAVENOUS; SUBCUTANEOUS
Qty: 15 ML | Refills: 0 | Status: SHIPPED | OUTPATIENT
Start: 2022-02-07 | End: 2022-09-20

## 2022-02-07 ASSESSMENT — ASTHMA QUESTIONNAIRES
EMERGENCY_ROOM_LAST_YEAR_TOTAL: ONE
QUESTION_1 LAST FOUR WEEKS HOW MUCH OF THE TIME DID YOUR ASTHMA KEEP YOU FROM GETTING AS MUCH DONE AT WORK, SCHOOL OR AT HOME: ALL OF THE TIME
ACT_TOTALSCORE: 7
QUESTION_2 LAST FOUR WEEKS HOW OFTEN HAVE YOU HAD SHORTNESS OF BREATH: MORE THAN ONCE A DAY
ACT_TOTALSCORE: 7
QUESTION_5 LAST FOUR WEEKS HOW WOULD YOU RATE YOUR ASTHMA CONTROL: POORLY CONTROLLED
QUESTION_3 LAST FOUR WEEKS HOW OFTEN DID YOUR ASTHMA SYMPTOMS (WHEEZING, COUGHING, SHORTNESS OF BREATH, CHEST TIGHTNESS OR PAIN) WAKE YOU UP AT NIGHT OR EARLIER THAN USUAL IN THE MORNING: TWO OR THREE NIGHTS A WEEK
QUESTION_4 LAST FOUR WEEKS HOW OFTEN HAVE YOU USED YOUR RESCUE INHALER OR NEBULIZER MEDICATION (SUCH AS ALBUTEROL): THREE OR MORE TIMES PER DAY

## 2022-02-07 NOTE — PROGRESS NOTES
Assessment/Plan:    50 y.o.-year-old woman with history of organic fuel exposure in the home was previously had nondiagnostic PFTs.  She was previously evaluated by Dr. 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  For the present we would recommend;    We will check stool culture for ova and parasite.    Obtain strongyloidiasis antibodies and Schistosoma antibodies.    Check bronchopulmonary aspergillosis panel (has had a negative aspergillus antibody in the past).    Obtain blood morphology with a peripheral smear.    Repeat VELMA/ANCA.    Continue Breo Ellipta 1 puff daily.  She knows to gargle after using this.    Continue Spiriva.    Continue to use 3 times daily albuterol nebs.    Continue Protonix 40 mg daily.    Continue Dupixent injections.    She is due to follow-up with Dr. Marie and is about to run out of her Dupixent.  We will help set up an appointment with Dr. Marie.    Has received both doses of her Covid-19 vaccine.    Return to clinic in 4 weeks.    Tamra ALCANTARA [facility]  Pulmonary and Critical Care  [id number]          Subjective:    Patient ID: Ms. Amin is a 49 y.o.female with a past medical history significant for anxiety, arthritis, questionable asthma versus COPD, diabetes, hypertension and a prior history of SVT presents with a follow-up visit for her pulmonary complaints.  She follows with me fairly closely for her moderate-persistent, difficult to control asthma symptoms.   Since her last clinic visit she has continued to cough--she had been prescribed Robitussin with Codeine for this and had no alleviation of her symptoms with this. She has also continued to be compliant with her  Dupixent and states that she has only one injection left (Has not seen Dr. Marie in almost a year). No fevers, no chills, endorses night sweats, no change in her weight. Continues to have nighttime awakenings with coughing and shortness of breath. She continues to endorse using her inhaled bronchodilator regimen namely, Breo ellipta, Spiriva, Albuterol TID and has breakthrough symptoms despite this.     Pertinent past medical history:  48-year-old female here for follow-up.  Her breathing is a bit worse than 6 months ago.  She had multiple ER visits and evaluations.  This includes negative PE study.  She had a cath with a 75% LAD lesion which was intervened upon.  Since her catheterization she feels she has more heartburn.  Her breathing is worse with exertion.  Improves with rest.  It is also worse with cold weather.  Warm weather and humid air does not bother.  Symptoms localized to the chest.  Pertinent positives include burning sensation in the chest.  Pertinent negatives include no fever or hemoptysis.    Current Outpatient Medications   Medication Sig Dispense Refill     acetaminophen (TYLENOL) 500 MG tablet Take 2 tablets (1,000 mg) by mouth every 8 hours 100 tablet 0     albuterol (PROAIR HFA/PROVENTIL HFA/VENTOLIN HFA) 108 (90 Base) MCG/ACT inhaler Inhale 2 puffs into the lungs every 4 hours as needed for shortness of breath / dyspnea 4 g 11     albuterol (PROVENTIL) (2.5 MG/3ML) 0.083% neb solution Inhale 2.5 mg into the lungs every 6 hours as needed        amitriptyline (ELAVIL) 10 MG tablet Take 2 tablets by mouth At Bedtime        aspirin (ASA) 325 MG tablet Take 325 mg by mouth daily       ASPIRIN LOW DOSE 81 MG EC tablet Take 81 mg by mouth daily        atorvastatin (LIPITOR) 80 MG tablet TAKE 1 TABLET (80 MG TOTAL) BY MOUTH AT BEDTIME FOR CHOLESTEROL       B-D U/F 31G X 8 MM insulin pen needle USE ONE PEN NEEDLE DAILY AS NEEDED FOR  each 1     clopidogrel (PLAVIX) 75 MG tablet Take 1 tablet (75  mg) by mouth daily 90 tablet 3     colchicine (COLCYRS) 0.6 MG tablet TAKE 1 TABLET (0.6 MG TOTAL) BY MOUTH DAILY       Continuous Blood Gluc  (FREESTYLE MONICA 14 DAY READER) MICHAEL Uses daily       Continuous Blood Gluc Sensor (FREESTYLE MONICA 14 DAY SENSOR) [facility] USE 1 UNITS AS DIRECTED EVERY 14 (FOURTEEN) DAYS. 2 each 3     CONTOUR NEXT TEST test strip USE 1 EACH AS DIRECTED 3 (THREE) TIMES A DAY.       cyclobenzaprine (FLEXERIL) 10 MG tablet TAKE 1 TABLET (10 MG TOTAL) BY MOUTH 3 (THREE) TIMES A DAY AS NEEDED FOR MUSCLE SPASMS.       diclofenac (VOLTAREN) 1 % topical gel Apply 2 g topically 4 times daily 50 g 1     Dupilumab (DUPIXENT) 200 MG/1.14ML SOSY Inject 200 mg Subcutaneous every 14 days 1.14 mL 6     ferrous sulfate (FEROSUL) 325 (65 Fe) MG tablet Take 325 mg by mouth daily (with breakfast)        fluticasone-vilanterol (BREO ELLIPTA) 200-25 MCG/INH inhaler Inhale 1 puff into the lungs daily 60 each 11     gabapentin (NEURONTIN) 300 MG capsule Take 2 capsules (600 mg) by mouth 3 times daily 180 capsule 3     Global Inject Ease Lancets 30G MISC        guaiFENesin (ROBITUSSIN) 100 MG/5ML liquid Take 10 mLs (200 mg) by mouth every 4 hours as needed for cough 200 mL 1     guaiFENesin-codeine (ROBITUSSIN AC) 100-10 MG/5ML solution Take 5-10 mLs by mouth every 4 hours as needed for cough 240 mL 0     hydrochlorothiazide (MICROZIDE) 12.5 MG capsule TAKE 1 CAPSULE (12.5 MG TOTAL) BY MOUTH DAILY FOR BLOOD PRESSURE 90 capsule 2     hydrocortisone (CORTAID) 1 % external cream Apply topically 2 times daily 60 g 0     insulin glargine (LANTUS PEN) 100 UNIT/ML pen Inject 24 Units Subcutaneous At Bedtime 15 mL 3     insulin pen needle (32G X 4 MM) 32G X 4 MM miscellaneous Use one pen needles daily or as directed. 200 each 11     ipratropium - albuterol 0.5 mg/2.5 mg/3 mL (DUONEB) 0.5-2.5 (3) MG/3ML neb solution Take 1 vial (3 mLs) by nebulization every 6 hours as needed for shortness of breath / dyspnea or  wheezing 240 mL 11     meclizine (ANTIVERT) 25 MG tablet Take 25 mg by mouth daily as needed        metFORMIN (GLUCOPHAGE-XR) 500 MG 24 hr tablet Take 1 tablet (500 mg) by mouth daily (with dinner) 90 tablet 1     metoprolol tartrate (LOPRESSOR) 25 MG tablet TAKE 1 TABLET (25 MG TOTAL) BY MOUTH 2 (TWO) TIMES A DAY FOR BLOOD PRESSURE       montelukast (SINGULAIR) 10 MG tablet Take 1 tablet (10 mg) by mouth At Bedtime 30 tablet 6     omeprazole (PRILOSEC) 40 MG DR capsule Take 1 capsule (40 mg) by mouth daily 90 capsule 3     ondansetron (ZOFRAN) 4 MG tablet Take 1 tablet (4 mg) by mouth every 8 hours as needed for nausea 30 tablet 1     polyethylene glycol (MIRALAX) powder Take 17 g by mouth daily 510 g 1     polyvinyl alcohol (ARTIFICIAL TEARS) 1.4 % ophthalmic solution Place 1 drop into both eyes as needed for dry eyes 15 mL 3     predniSONE (DELTASONE) 2.5 MG tablet Take 2.5 mg by mouth daily        sucralfate (CARAFATE) 1 GM tablet TAKE 1 TABLET (1 G TOTAL) BY MOUTH 4 (FOUR) TIMES A DAY BEFORE MEALS AND AT BEDTIME. TAKE 1 HOUR PRIOR TO MEALS 120 tablet 11     tiotropium (SPIRIVA RESPIMAT) 2.5 MCG/ACT inhalation aerosol Inhale 2 puffs into the lungs daily 4 g 1     furosemide (LASIX) 20 MG tablet Take 1 tablet (20 mg) by mouth daily for 10 days 10 tablet 1     Insulin Aspart FlexPen 100 UNIT/ML SOPN GIVE BEFORE MEALS: FOR PRE-MEAL GLUCOSE: 140-189 GIVE 1 UNIT, 190-239 GIVE 2 UNITS, 240-289 GIVE 3 UNITS, 290-339 GIVE 4 UNITS, =OR>340 GIVE 6 UNITS *84* 15 mL 0     Social history:  Lives in a house built in 1963; no concern for mold in the house.  7 People live in the house including 2 children.  There are no pets in the house.  She is a never smoker and there is no second hand exposure to smoke.  She does not drink alcoholic beverages and denies indulging in recreational drugs.    Physical Exam   /62 (BP Location: Left arm, Patient Position: Chair, Cuff Size: Adult Regular)   Pulse 68   Resp 18   Wt 59.9 kg  (132 lb)   SpO2 96%   BMI 22.66 kg/m    Physical Exam  Constitutional:       Appearance: Normal appearance.   HENT:      Head: Normocephalic.      Mouth/Throat:      Mouth: Mucous membranes are moist.   Cardiovascular:      Rate and Rhythm: Normal rate and regular rhythm.      Heart sounds: Normal heart sounds.   Pulmonary:      Effort: Pulmonary effort is normal.      Breath sounds: Normal breath sounds. No wheezing.   Abdominal:      General: Abdomen is flat.   Skin:     General: Skin is warm.   Neurological:      General: No focal deficit present.      Mental Status: She is alert.               Tamra Duong MD  Pulmonary and Critical Care  (p) [phone]           Fellow

## 2022-02-07 NOTE — PROGRESS NOTES
Assessment/Plan:    50 y.o.-year-old woman with history of organic fuel exposure in the home; visit was conducted through a [place] .  Her PFTs have been previously noted not to be diagnostic due to poor effort.  Was evaluated by Dr. Marie and initiated on Dupixent and has been doing significantly better since then.  For the present we would recommend;    Continue Breo Ellipta 1 puff daily.  She knows to gargle after using this.    Continue Spiriva.    Continue to use 3 times daily albuterol nebs.    Continue Protonix 40 mg daily.    She is up-to-date with her influenza vaccination.    Continue Dupixent injections.    Has received both doses of her Covid-19 vaccine.    I have initiated her on 20 mg a day of Lasix for 10 days to see if this alleviates her symptoms.  I have also advised that she eats bananas while she is on Lasix (recognizing that this causes her blood sugars to go up.  (    Return to clinic in 6 weeks.    Tamra Duong  Pulmonary and Critical Care  [id number]          Subjective:   Patient ID: Ms. Amin is a 49 y.o.female with a past medical history significant for anxiety, arthritis, questionable asthma versus COPD, diabetes, hypertension and a prior history of SVT presents with a follow-up visit for her pulmonary complaints.  She follows with me fairly closely for her moderate-persistent, difficult to control asthma symptoms.     Since her last clinic visit with me, she once again has had significant worsening of her cough and received another course of prednisone through our clinic which helped a little but not as much.  She wakes up at night with cough episodes and also with fevers.      Pertinent past medical history:  48-year-old female here for follow-up.  Her breathing is a bit worse than 6 months ago.  She had multiple ER visits and evaluations.  This includes negative PE study.  She had a cath with a 75% LAD lesion which was intervened upon.  Since her catheterization she feels she  has more heartburn.  Her breathing is worse with exertion.  Improves with rest.  It is also worse with cold weather.  Warm weather and humid air does not bother.  Symptoms localized to the chest.  Pertinent positives include burning sensation in the chest.  Pertinent negatives include no fever or hemoptysis.    Current Outpatient Medications   Medication Sig Dispense Refill     acetaminophen (TYLENOL) 500 MG tablet Take 2 tablets (1,000 mg) by mouth every 8 hours 100 tablet 0     albuterol (PROAIR HFA/PROVENTIL HFA/VENTOLIN HFA) 108 (90 Base) MCG/ACT inhaler Inhale 2 puffs into the lungs every 4 hours as needed for shortness of breath / dyspnea 4 g 11     albuterol (PROVENTIL) (2.5 MG/3ML) 0.083% neb solution Inhale 2.5 mg into the lungs every 6 hours as needed        amitriptyline (ELAVIL) 10 MG tablet Take 2 tablets by mouth At Bedtime        aspirin (ASA) 325 MG tablet Take 325 mg by mouth daily       ASPIRIN LOW DOSE 81 MG EC tablet Take 81 mg by mouth daily        atorvastatin (LIPITOR) 80 MG tablet TAKE 1 TABLET (80 MG TOTAL) BY MOUTH AT BEDTIME FOR CHOLESTEROL       B-D U/F 31G X 8 MM insulin pen needle USE ONE PEN NEEDLE DAILY AS NEEDED FOR  each 1     clopidogrel (PLAVIX) 75 MG tablet Take 1 tablet (75 mg) by mouth daily 90 tablet 3     colchicine (COLCYRS) 0.6 MG tablet TAKE 1 TABLET (0.6 MG TOTAL) BY MOUTH DAILY       Continuous Blood Gluc  (FREESTYLE MONICA 14 DAY READER) MICHAEL Uses daily       Continuous Blood Gluc Sensor (FREESTYLE MONICA 14 DAY SENSOR) MISC USE 1 UNITS AS DIRECTED EVERY 14 (FOURTEEN) DAYS. 2 each 3     CONTOUR NEXT TEST test strip USE 1 EACH AS DIRECTED 3 (THREE) TIMES A DAY.       cyclobenzaprine (FLEXERIL) 10 MG tablet TAKE 1 TABLET (10 MG TOTAL) BY MOUTH 3 (THREE) TIMES A DAY AS NEEDED FOR MUSCLE SPASMS.       diclofenac (VOLTAREN) 1 % topical gel Apply 2 g topically 4 times daily 50 g 1     Dupilumab (DUPIXENT) 200 MG/1.14ML SOSY Inject 200 mg Subcutaneous every 14  days 1.14 mL 6     ferrous sulfate (FEROSUL) 325 (65 Fe) MG tablet Take 325 mg by mouth daily (with breakfast)        fluticasone-vilanterol (BREO ELLIPTA) 200-25 MCG/INH inhaler Inhale 1 puff into the lungs daily 60 each 11     gabapentin (NEURONTIN) 300 MG capsule Take 2 capsules (600 mg) by mouth 3 times daily 180 capsule 3     Global Inject Ease Lancets 30G MISC        guaiFENesin (ROBITUSSIN) 100 MG/5ML liquid Take 10 mLs (200 mg) by mouth every 4 hours as needed for cough 200 mL 1     guaiFENesin-codeine (ROBITUSSIN AC) 100-10 MG/5ML solution Take 5-10 mLs by mouth every 4 hours as needed for cough 240 mL 0     hydrochlorothiazide (MICROZIDE) 12.5 MG capsule TAKE 1 CAPSULE (12.5 MG TOTAL) BY MOUTH DAILY FOR BLOOD PRESSURE 90 capsule 2     hydrocortisone (CORTAID) 1 % external cream Apply topically 2 times daily 60 g 0     insulin glargine (LANTUS PEN) 100 UNIT/ML pen Inject 24 Units Subcutaneous At Bedtime 15 mL 3     insulin pen needle (32G X 4 MM) 32G X 4 MM miscellaneous Use one pen needles daily or as directed. 200 each 11     ipratropium - albuterol 0.5 mg/2.5 mg/3 mL (DUONEB) 0.5-2.5 (3) MG/3ML neb solution Take 1 vial (3 mLs) by nebulization every 6 hours as needed for shortness of breath / dyspnea or wheezing 240 mL 11     meclizine (ANTIVERT) 25 MG tablet Take 25 mg by mouth daily as needed        metFORMIN (GLUCOPHAGE-XR) 500 MG 24 hr tablet Take 1 tablet (500 mg) by mouth daily (with dinner) 90 tablet 1     metoprolol tartrate (LOPRESSOR) 25 MG tablet TAKE 1 TABLET (25 MG TOTAL) BY MOUTH 2 (TWO) TIMES A DAY FOR BLOOD PRESSURE       montelukast (SINGULAIR) 10 MG tablet Take 1 tablet (10 mg) by mouth At Bedtime 30 tablet 6     omeprazole (PRILOSEC) 40 MG DR capsule Take 1 capsule (40 mg) by mouth daily 90 capsule 3     ondansetron (ZOFRAN) 4 MG tablet Take 1 tablet (4 mg) by mouth every 8 hours as needed for nausea 30 tablet 1     polyethylene glycol (MIRALAX) powder Take 17 g by mouth daily 510 g 1      polyvinyl alcohol (ARTIFICIAL TEARS) 1.4 % ophthalmic solution Place 1 drop into both eyes as needed for dry eyes 15 mL 3     predniSONE (DELTASONE) 2.5 MG tablet Take 2.5 mg by mouth daily        sucralfate (CARAFATE) 1 GM tablet TAKE 1 TABLET (1 G TOTAL) BY MOUTH 4 (FOUR) TIMES A DAY BEFORE MEALS AND AT BEDTIME. TAKE 1 HOUR PRIOR TO MEALS 120 tablet 11     tiotropium (SPIRIVA RESPIMAT) 2.5 MCG/ACT inhalation aerosol Inhale 2 puffs into the lungs daily 4 g 1     furosemide (LASIX) 20 MG tablet Take 1 tablet (20 mg) by mouth daily for 10 days 10 tablet 1     Insulin Aspart FlexPen 100 UNIT/ML SOPN GIVE BEFORE MEALS: FOR PRE-MEAL GLUCOSE: 140-189 GIVE 1 UNIT, 190-239 GIVE 2 UNITS, 240-289 GIVE 3 UNITS, 290-339 GIVE 4 UNITS, =OR>340 GIVE 6 UNITS *84* 15 mL 0       Physical Exam   /62 (BP Location: Left arm, Patient Position: Chair, Cuff Size: Adult Regular)   Pulse 68   Resp 18   Wt 59.9 kg (132 lb)   SpO2 96%   BMI 22.66 kg/m    Physical Exam  Constitutional:       Appearance: Normal appearance.   HENT:      Head: Normocephalic.      Mouth/Throat:      Mouth: Mucous membranes are moist.   Cardiovascular:      Rate and Rhythm: Normal rate and regular rhythm.      Heart sounds: Normal heart sounds.   Pulmonary:      Effort: Pulmonary effort is normal.      Breath sounds: Normal breath sounds. No wheezing.   Abdominal:      General: Abdomen is flat.   Skin:     General: Skin is warm.   Neurological:      General: No focal deficit present.      Mental Status: She is alert.               Tamra Duong MD  Pulmonary and Critical Care  (p) [phone]

## 2022-02-07 NOTE — TELEPHONE ENCOUNTER
Reason for Call:  Other appointment    Detailed comments: pt calling to schedule sleep study    Phone Number Patient can be reached at: Cell number on file:    Telephone Information:   Mobile [phone]       Best Time: any    Can we leave a detailed message on this number? YES    Call taken on 2/7/2022 at 9:25 AM by Dania Salas

## 2022-02-08 ENCOUNTER — LAB (OUTPATIENT)
Dept: LAB | Facility: HOSPITAL | Age: 52
End: 2022-02-08
Payer: COMMERCIAL

## 2022-02-08 DIAGNOSIS — D72.10 EOSINOPHILIA, UNSPECIFIED TYPE: Primary | ICD-10-CM

## 2022-02-08 DIAGNOSIS — D72.10 EOSINOPHILIA: Primary | ICD-10-CM

## 2022-02-08 LAB
PATH REPORT.COMMENTS IMP SPEC: NORMAL
PATH REPORT.COMMENTS IMP SPEC: NORMAL
PATH REPORT.FINAL DX SPEC: NORMAL
PATH REPORT.MICROSCOPIC SPEC OTHER STN: NORMAL
PATH REPORT.RELEVANT HX SPEC: NORMAL

## 2022-02-08 NOTE — PROGRESS NOTES
Phone call from [facility]'s lab.  They received specimen for O&P, however it is not in proper collection container and they did not receive within one hour of collection. This sample cannot be used and patient will need to re-collect.       Spoke with patient's daughter-in-law with help of Serbian .  They will go to [facility]'s lab and  specimen collection container and obtain a new specimen.

## 2022-02-09 ENCOUNTER — LAB (OUTPATIENT)
Dept: LAB | Facility: HOSPITAL | Age: 52
End: 2022-02-09
Payer: COMMERCIAL

## 2022-02-09 DIAGNOSIS — D72.10 EOSINOPHILIA: ICD-10-CM

## 2022-02-09 LAB
ANA SER QL IF: NEGATIVE
ANCA AB PATTERN SER IF-IMP: NORMAL
C-ANCA TITR SER IF: NORMAL {TITER}

## 2022-02-09 PROCEDURE — 87209 SMEAR COMPLEX STAIN: CPT

## 2022-02-10 LAB
DEPRECATED MISC ALLERGEN IGE RAST QL: NORMAL
O+P STL MICRO: NEGATIVE

## 2022-02-11 DIAGNOSIS — M54.41 CHRONIC BILATERAL LOW BACK PAIN WITH BILATERAL SCIATICA: ICD-10-CM

## 2022-02-11 DIAGNOSIS — G89.29 CHRONIC BILATERAL LOW BACK PAIN WITH BILATERAL SCIATICA: ICD-10-CM

## 2022-02-11 DIAGNOSIS — M54.42 CHRONIC BILATERAL LOW BACK PAIN WITH BILATERAL SCIATICA: ICD-10-CM

## 2022-02-11 DIAGNOSIS — M79.18 MYOFASCIAL PAIN: ICD-10-CM

## 2022-02-11 LAB — STRONGYLOIDES IGG SER IA-ACNC: 0.4 IV

## 2022-02-14 ENCOUNTER — TRANSFERRED RECORDS (OUTPATIENT)
Dept: HEALTH INFORMATION MANAGEMENT | Facility: CLINIC | Age: 52
End: 2022-02-14
Payer: COMMERCIAL

## 2022-02-14 LAB — RETINOPATHY: NEGATIVE

## 2022-02-14 RX ORDER — CYCLOBENZAPRINE HCL 5 MG
5-10 TABLET ORAL 3 TIMES DAILY PRN
Qty: 30 TABLET | Refills: 3 | Status: SHIPPED | OUTPATIENT
Start: 2022-02-14 | End: 2022-08-22

## 2022-02-17 ENCOUNTER — OFFICE VISIT (OUTPATIENT)
Dept: CARDIOLOGY | Facility: CLINIC | Age: 52
End: 2022-02-17
Payer: COMMERCIAL

## 2022-02-17 VITALS
RESPIRATION RATE: 16 BRPM | SYSTOLIC BLOOD PRESSURE: 102 MMHG | WEIGHT: 130 LBS | BODY MASS INDEX: 22.31 KG/M2 | DIASTOLIC BLOOD PRESSURE: 76 MMHG | HEART RATE: 88 BPM

## 2022-02-17 DIAGNOSIS — R06.02 SOB (SHORTNESS OF BREATH): ICD-10-CM

## 2022-02-17 DIAGNOSIS — I25.10 CORONARY ARTERY DISEASE DUE TO LIPID RICH PLAQUE: Chronic | ICD-10-CM

## 2022-02-17 DIAGNOSIS — Z95.5 S/P CORONARY ARTERY STENT PLACEMENT: Chronic | ICD-10-CM

## 2022-02-17 DIAGNOSIS — G89.29 CHRONIC ABDOMINAL PAIN: Primary | ICD-10-CM

## 2022-02-17 DIAGNOSIS — R10.9 CHRONIC ABDOMINAL PAIN: Primary | ICD-10-CM

## 2022-02-17 DIAGNOSIS — I25.83 CORONARY ARTERY DISEASE DUE TO LIPID RICH PLAQUE: Chronic | ICD-10-CM

## 2022-02-17 LAB
A FUMIGATUS IGE QN: <0.1 KU/L
A FUMIGATUS1 AB SER QL ID: NORMAL
A FUMIGATUS6 AB SER QL ID: NORMAL
IGE SERPL-ACNC: 74 KU/L
SCHISTOSOMA IGG SER QL: NEGATIVE

## 2022-02-17 PROCEDURE — 99214 OFFICE O/P EST MOD 30 MIN: CPT | Performed by: INTERNAL MEDICINE

## 2022-02-17 RX ORDER — FAMOTIDINE 20 MG/1
20 TABLET, FILM COATED ORAL 2 TIMES DAILY
Qty: 90 TABLET | Refills: 3 | Status: SHIPPED | OUTPATIENT
Start: 2022-02-17 | End: 2022-07-14

## 2022-02-17 RX ORDER — LORATADINE 10 MG/1
10 TABLET ORAL DAILY
COMMUNITY
Start: 2022-02-14 | End: 2022-05-04

## 2022-02-17 NOTE — LETTER
2/17/2022    Adrien Cardoza MD  [address] [place] [address]    RE: Kulwant Amin       Dear Colleague,     I had the pleasure of seeing Kulwant Amin in the [facility].    Cardiology Clinic Office Note    Assessment / Plan:    1.  Epigastric, lower substernal discomfort provoked by meals suspicious for GI etiology.  We will add famotidine to her present medical regimen therapeutic trial.  No evidence of cardiac cause on multiple previous evaluations.  Will discontinue clopidogrel now 3 years out from her intervention as this could be contributing as well.  2.  Dyspnea on exertion, daytime sleepiness.  Sleep apnea evaluation pending.  3.  History of coronary artery disease with stent placement 2018 which did not result in any improvement in her symptoms.  Will discontinue clopidogrel at this time as it could be contributing to her abdominal discomfort.    Follow-up 1 year    ______________________________________________________________________    Subjective:    I had the opportunity to see Kulwant Amin at the [facility]. uKlwant Amin is a 52 year old female with a history of coronary artery disease, hypertension, hyperlipidemia, diabetes mellitus type 2.  She quit smoking 2 decades ago.  1/2018 she had stent placement to proximal to mid LAD.  Multiple stress tests since that time for evaluation of chest discomfort including stress MRI have not shown evidence of inducible ischemia.  She has had recurrent complaints of chest discomfort, suspected at one point of having pericarditis treated with anti-inflammatory treatment.  She was last seen 3 months ago, at which time sleep apnea evaluation was suggested, currently scheduled for April.  Pulmonary evaluation is underway for eosinophilia.    She has had multiple emergency room visits since I saw her last for complaints of shortness of breath.  Last month she tested positive for COVID-19.  Multiple  assessments of BNP over the last 3 years have all been within normal limits.    She is accompanied by her daughter today and interpretation is by and [facility] telephone .  She reports intermittent burning chest pain that is somewhat improved over the last few months.  She reports this is the same pain she has been experiencing for 3 years.  She now notes that it is less severe and occurs less often but still on a daily basis.  She notes that it is fairly consistently provoked by meals, and that it is unprovoked by activity and not worsened with activity.  She has not found antacids to be helpful.  Episodes last 5 to 10 minutes.    ______________________________________________________________________    Problem List:  Patient Active Problem List   Diagnosis     Pulmonary nodule, right     Environmental allergies     TB lung, latent     COPD (chronic obstructive pulmonary disease)/Asthma      HTN (hypertension)     Esophageal reflux (GERD)     Hyperthyroidism     Cataracts, bilateral     Corneal opacity     Irregular astigmatism     Anxiety     Chronic nonintractable headache, unspecified headache type     Coronary artery disease due to lipid rich plaque     Dizziness     Hyperlipidemia     Microcytic anemia     Moderate major depression (H)     Moderate persistent asthma     Nonspecific reaction to tuberculin skin test without active tuberculosis     Type 2 diabetes mellitus treated with insulin (H)     Unspecified visual loss     SOB (shortness of breath)     GERD (gastroesophageal reflux disease)     Dental caries     S/P coronary artery stent placement     H/O arterial ischemic stroke     Constipation     Dysphagia     Heartburn     Chronic abdominal pain     Infection due to 2019 novel coronavirus     Insomnia     Medical History:  Past Medical History:   Diagnosis Date     Acute asthma exacerbation 1/6/2020     Anxiety      Arthritis      Asthma exacerbation 11/19/2015     Chronic obstructive pulmonary  disease, unspecified COPD type (H) 2021     COPD (chronic obstructive pulmonary disease) (H)      Coronary artery disease due to lipid rich plaque 2018     Depression      Dyspnea 2013     Epigastric pain 12/15/2021     Essential hypertension 2017     GERD (gastroesophageal reflux disease)      Latent tuberculosis 2019     Lower GI bleeding      Nonspecific (abnormal) findings on radiological and other examination of other intrathoracic organs 2012     Pneumonia      TB lung, latent     9 mos INH     Surgical History:  Past Surgical History:   Procedure Laterality Date     CORONARY STENT PLACEMENT       CV CORONARY ANGIOGRAM N/A 2018    Procedure: Coronary Angiogram;  Surgeon: Angelo Serrano MD;  Location: [facility];  Service:      VT ESOPHAGOGASTRODUODENOSCOPY TRANSORAL DIAGNOSTIC N/A 12/10/2018    Procedure: ESOPHAGOGASTRODUODENOSCOPY (EGD);  Surgeon: Eddie Renteria MD;  Location: [facility];  Service: Gastroenterology     VT ESOPHAGOGASTRODUODENOSCOPY TRANSORAL DIAGNOSTIC N/A 12/3/2020    Procedure: ESOPHAGOGASTRODUODENOSCOPY (EGD) with biospies ;  Surgeon: Avi Crow MD;  Location: [facility];  Service: Gastroenterology     [facility] COLONOSCOPY W/WO BRUSH/WASH N/A 12/10/2018    Procedure: COLONOSCOPY with polypectomy using biopsy forceps;  Surgeon: Eddie Renteria MD;  Location: [facility];  Service: Gastroenterology     Social History:  Social History     Socioeconomic History     Marital status:      Spouse name: Not on file     Number of children: Not on file     Years of education: Not on file     Highest education level: Not on file   Occupational History     Not on file   Tobacco Use     Smoking status: Former Smoker     Packs/day: 1.00     Years: 30.00     Pack years: 30.00     Types: Cigarettes, Cigarettes, Cigarettes     Quit date: 2003     Years since quittin.1     Smokeless tobacco: Never Used     Tobacco comment: No  passive exposure   Substance and Sexual Activity     Alcohol use: No     Drug use: No     Sexual activity: Yes     Partners: Male   Other Topics Concern     Parent/sibling w/ CABG, MI or angioplasty before 65F 55M? Not Asked   Social History Narrative    2017 The patient lives with her daughter-in-law (who is present), , son, and 2 grandchildren (total of 6 people). [place].     Social Determinants of Health     Financial Resource Strain: Not on file   Food Insecurity: Not on file   Transportation Needs: Not on file   Physical Activity: Not on file   Stress: Not on file   Social Connections: Not on file   Intimate Partner Violence: Not on file   Housing Stability: Not on file     Sleep History:  Poorly restorative.  Exercise History:  Walks with cane    Review of Systems:        12 point review of systems otherwise negative        Family History:  Family History   Problem Relation Age of Onset     Other - See Comments Mother          of an intestinal problem     Ulcers Father          of gastritis     Breast Cancer No family hx of          Allergies:  No Known Allergies  Medications:  Current Outpatient Medications   Medication Sig Dispense Refill     acetaminophen (TYLENOL) 500 MG tablet Take 2 tablets (1,000 mg) by mouth every 8 hours 100 tablet 0     albuterol (PROAIR HFA/PROVENTIL HFA/VENTOLIN HFA) 108 (90 Base) MCG/ACT inhaler Inhale 2 puffs into the lungs every 4 hours as needed for shortness of breath / dyspnea 4 g 11     albuterol (PROVENTIL) (2.5 MG/3ML) 0.083% neb solution Inhale 2.5 mg into the lungs every 6 hours as needed        ALLERGY RELIEF 10 MG tablet Take 10 mg by mouth daily       amitriptyline (ELAVIL) 10 MG tablet Take 2 tablets by mouth At Bedtime        ASPIRIN LOW DOSE 81 MG EC tablet Take 81 mg by mouth daily        atorvastatin (LIPITOR) 80 MG tablet TAKE 1 TABLET (80 MG TOTAL) BY MOUTH AT BEDTIME FOR CHOLESTEROL       B-D U/F 31G X 8 MM insulin pen needle USE ONE  PEN NEEDLE DAILY AS NEEDED FOR  each 1     colchicine (COLCYRS) 0.6 MG tablet TAKE 1 TABLET (0.6 MG TOTAL) BY MOUTH DAILY       Continuous Blood Gluc  (FREESTYLE MONICA 14 DAY READER) MICHAEL Uses daily       Continuous Blood Gluc Sensor (FREESTYLE MONICA 14 DAY SENSOR) [facility] USE 1 UNITS AS DIRECTED EVERY 14 (FOURTEEN) DAYS. 2 each 3     CONTOUR NEXT TEST test strip USE 1 EACH AS DIRECTED 3 (THREE) TIMES A DAY.       cyclobenzaprine (FLEXERIL) 5 MG tablet TAKE 1-2 TABLETS (5-10 MG) BY MOUTH 3 TIMES DAILY AS NEEDED FOR MUSCLE SPASMS 30 tablet 3     Dupilumab (DUPIXENT) 200 MG/1.14ML SOSY Inject 200 mg Subcutaneous every 14 days 1.14 mL 6     famotidine (PEPCID) 20 MG tablet Take 1 tablet (20 mg) by mouth 2 times daily 90 tablet 3     fluticasone-vilanterol (BREO ELLIPTA) 200-25 MCG/INH inhaler Inhale 1 puff into the lungs daily 60 each 11     gabapentin (NEURONTIN) 300 MG capsule Take 2 capsules (600 mg) by mouth 3 times daily 180 capsule 3     Global Inject Ease Lancets 30G MISC        guaiFENesin (ROBITUSSIN) 100 MG/5ML liquid Take 10 mLs (200 mg) by mouth every 4 hours as needed for cough 200 mL 1     hydrochlorothiazide (MICROZIDE) 12.5 MG capsule TAKE 1 CAPSULE (12.5 MG TOTAL) BY MOUTH DAILY FOR BLOOD PRESSURE 90 capsule 2     hydrocortisone (CORTAID) 1 % external cream Apply topically 2 times daily 60 g 0     Insulin Aspart FlexPen 100 UNIT/ML SOPN GIVE BEFORE MEALS: FOR PRE-MEAL GLUCOSE: 140-189 GIVE 1 UNIT, 190-239 GIVE 2 UNITS, 240-289 GIVE 3 UNITS, 290-339 GIVE 4 UNITS, =OR>340 GIVE 6 UNITS *84* 15 mL 0     insulin glargine (LANTUS PEN) 100 UNIT/ML pen Inject 24 Units Subcutaneous At Bedtime 15 mL 3     insulin pen needle (32G X 4 MM) 32G X 4 MM miscellaneous Use one pen needles daily or as directed. 200 each 11     ipratropium - albuterol 0.5 mg/2.5 mg/3 mL (DUONEB) 0.5-2.5 (3) MG/3ML neb solution Take 1 vial (3 mLs) by nebulization every 6 hours as needed for shortness of breath / dyspnea or  wheezing 240 mL 11     meclizine (ANTIVERT) 25 MG tablet Take 25 mg by mouth daily as needed        metFORMIN (GLUCOPHAGE-XR) 500 MG 24 hr tablet Take 1 tablet (500 mg) by mouth daily (with dinner) 90 tablet 1     metoprolol tartrate (LOPRESSOR) 25 MG tablet TAKE 1 TABLET (25 MG TOTAL) BY MOUTH 2 (TWO) TIMES A DAY FOR BLOOD PRESSURE       montelukast (SINGULAIR) 10 MG tablet Take 1 tablet (10 mg) by mouth At Bedtime 30 tablet 6     omeprazole (PRILOSEC) 40 MG DR capsule Take 1 capsule (40 mg) by mouth daily 90 capsule 3     ondansetron (ZOFRAN) 4 MG tablet Take 1 tablet (4 mg) by mouth every 8 hours as needed for nausea 30 tablet 1     polyethylene glycol (MIRALAX) powder Take 17 g by mouth daily 510 g 1     polyvinyl alcohol (ARTIFICIAL TEARS) 1.4 % ophthalmic solution Place 1 drop into both eyes as needed for dry eyes 15 mL 3     predniSONE (DELTASONE) 2.5 MG tablet Take 2.5 mg by mouth daily        sucralfate (CARAFATE) 1 GM tablet TAKE 1 TABLET (1 G TOTAL) BY MOUTH 4 (FOUR) TIMES A DAY BEFORE MEALS AND AT BEDTIME. TAKE 1 HOUR PRIOR TO MEALS 120 tablet 11     tiotropium (SPIRIVA RESPIMAT) 2.5 MCG/ACT inhalation aerosol Inhale 2 puffs into the lungs daily 4 g 1     diclofenac (VOLTAREN) 1 % topical gel Apply 2 g topically 4 times daily (Patient not taking: Reported on 2/17/2022) 50 g 1     furosemide (LASIX) 20 MG tablet Take 1 tablet (20 mg) by mouth daily for 10 days 10 tablet 1       Objective:   Wt Readings from Last 3 Encounters:   02/17/22 59 kg (130 lb)   02/07/22 59.9 kg (132 lb)   02/04/22 59.5 kg (131 lb 2 oz)     Vital signs:  /76 (BP Location: Left arm, Patient Position: Sitting, Cuff Size: Adult Regular)   Pulse 88   Resp 16   Wt 59 kg (130 lb)   BMI 22.31 kg/m        Physical Exam:    General Appearance : Awake, Alert, No acute distress  HEENT: No Scleral icterus; the mucous membranes were pink and moist.  Conjunctivae not injected  Neck:  No cervical bruits, jugular venous distention, or  thyromegaly   Chest: The spine was straight. Chest wall symmetric  Lungs: Respirations unlabored; the lungs are clear to auscultation.  Widely scattered wheezing  Cardiovascular:   Normal point of maximal impulse.  Auscultation reveals normal first and second heart sounds with no murmurs, rubs, or gallops.  Carotid, radial, and dorsalis pedal pulses are intact and symmetric.  Abdomen: No organomegaly, masses, bruits, or tenderness. Bowels sounds are present  Extremities:  No clubbing, cyanosis.  No edema  Skin: No rash, bruising  Musculoskeletal: No tenderness.  Neurologic: Alert and oriented ×3. Speech is fluent.    Lab Results:  LIPIDS:  Lab Results   Component Value Date    CHOL 142 11/30/2021    CHOL 105 05/06/2021    CHOL 116 01/14/2021     Lab Results   Component Value Date    HDL 58 11/30/2021    HDL 41 (L) 05/06/2021    HDL 39 (L) 01/14/2021     Lab Results   Component Value Date    LDL 48 11/30/2021    LDL 43 05/06/2021    LDL 52 01/14/2021     Lab Results   Component Value Date    TRIG 180 (H) 11/30/2021    TRIG 103 05/06/2021    TRIG 124 01/14/2021     Cardiac Imaging Studies:  Echocardiogram 1/2021:    When compared to the previous study dated 8/14/2020, no significant change.    Normal left ventricular size, wall thickness and wall motion. Left ventricle ejection fraction is normal. The calculated left ventricular ejection fraction is 57%.    Normal right ventricular size and systolic function.    No obvious valvular disease.     CTA chest PE run 8/2020:  1.  No evidence of pulmonary embolus.   2.  Moderate to large amount of airspace infiltrate involving the inferior half of the right lower lobe, compatible with pneumonia.   3.  Hepatic steatosis.        Cardiac stress MRI 7/2019:  Report still unavailable in [facility], but shows no evidence of ischemia or infarction with normal ejection fraction    24-hour Holter monitor 8/2019 showed no ventricular ectopy and 2 PACs.            This note created  using Dragon voice recognition software.  Sound alike errors may have escaped editing.

## 2022-02-17 NOTE — PROGRESS NOTES
Cardiology Clinic Office Note    Assessment / Plan:    1.  Epigastric, lower substernal discomfort provoked by meals suspicious for GI etiology.  We will add famotidine to her present medical regimen therapeutic trial.  No evidence of cardiac cause on multiple previous evaluations.  Will discontinue clopidogrel now 3 years out from her intervention as this could be contributing as well.  2.  Dyspnea on exertion, daytime sleepiness.  Sleep apnea evaluation pending.  3.  History of coronary artery disease with stent placement 2018 which did not result in any improvement in her symptoms.  Will discontinue clopidogrel at this time as it could be contributing to her abdominal discomfort.    Follow-up 1 year    ______________________________________________________________________    Subjective:    I had the opportunity to see Kulwant Amin at the [facility]. Kulwant Amin is a 52 year old female with a history of coronary artery disease, hypertension, hyperlipidemia, diabetes mellitus type 2.  She quit smoking 2 decades ago.  1/2018 she had stent placement to proximal to mid LAD.  Multiple stress tests since that time for evaluation of chest discomfort including stress MRI have not shown evidence of inducible ischemia.  She has had recurrent complaints of chest discomfort, suspected at one point of having pericarditis treated with anti-inflammatory treatment.  She was last seen 3 months ago, at which time sleep apnea evaluation was suggested, currently scheduled for April.  Pulmonary evaluation is underway for eosinophilia.    She has had multiple emergency room visits since I saw her last for complaints of shortness of breath.  Last month she tested positive for COVID-19.  Multiple assessments of BNP over the last 3 years have all been within normal limits.    She is accompanied by her daughter today and interpretation is by and [facility] telephone .  She reports  intermittent burning chest pain that is somewhat improved over the last few months.  She reports this is the same pain she has been experiencing for 3 years.  She now notes that it is less severe and occurs less often but still on a daily basis.  She notes that it is fairly consistently provoked by meals, and that it is unprovoked by activity and not worsened with activity.  She has not found antacids to be helpful.  Episodes last 5 to 10 minutes.    ______________________________________________________________________    Problem List:  Patient Active Problem List   Diagnosis     Pulmonary nodule, right     Environmental allergies     TB lung, latent     COPD (chronic obstructive pulmonary disease)/Asthma      HTN (hypertension)     Esophageal reflux (GERD)     Hyperthyroidism     Cataracts, bilateral     Corneal opacity     Irregular astigmatism     Anxiety     Chronic nonintractable headache, unspecified headache type     Coronary artery disease due to lipid rich plaque     Dizziness     Hyperlipidemia     Microcytic anemia     Moderate major depression (H)     Moderate persistent asthma     Nonspecific reaction to tuberculin skin test without active tuberculosis     Type 2 diabetes mellitus treated with insulin (H)     Unspecified visual loss     SOB (shortness of breath)     GERD (gastroesophageal reflux disease)     Dental caries     S/P coronary artery stent placement     H/O arterial ischemic stroke     Constipation     Dysphagia     Heartburn     Chronic abdominal pain     Infection due to 2019 novel coronavirus     Insomnia     Medical History:  Past Medical History:   Diagnosis Date     Acute asthma exacerbation 1/6/2020     Anxiety      Arthritis      Asthma exacerbation 11/19/2015     Chronic obstructive pulmonary disease, unspecified COPD type (H) 2/8/2021     COPD (chronic obstructive pulmonary disease) (H)      Coronary artery disease due to lipid rich plaque 1/25/2018     Depression      Dyspnea  2013     Epigastric pain 12/15/2021     Essential hypertension 2017     GERD (gastroesophageal reflux disease)      Latent tuberculosis 2019     Lower GI bleeding      Nonspecific (abnormal) findings on radiological and other examination of other intrathoracic organs 2012     Pneumonia      TB lung, latent     9 mos INH     Surgical History:  Past Surgical History:   Procedure Laterality Date     CORONARY STENT PLACEMENT       CV CORONARY ANGIOGRAM N/A 2018    Procedure: Coronary Angiogram;  Surgeon: Angelo Serrano MD;  Location: [facility];  Service:      WA ESOPHAGOGASTRODUODENOSCOPY TRANSORAL DIAGNOSTIC N/A 12/10/2018    Procedure: ESOPHAGOGASTRODUODENOSCOPY (EGD);  Surgeon: Eddie Renteria MD;  Location: [facility];  Service: Gastroenterology     WA ESOPHAGOGASTRODUODENOSCOPY TRANSORAL DIAGNOSTIC N/A 12/3/2020    Procedure: ESOPHAGOGASTRODUODENOSCOPY (EGD) with biospies ;  Surgeon: Avi Crow MD;  Location: [facility];  Service: Gastroenterology     [facility] COLONOSCOPY W/WO BRUSH/WASH N/A 12/10/2018    Procedure: COLONOSCOPY with polypectomy using biopsy forceps;  Surgeon: Eddie Renteria MD;  Location: [facility];  Service: Gastroenterology     Social History:  Social History     Socioeconomic History     Marital status:      Spouse name: Not on file     Number of children: Not on file     Years of education: Not on file     Highest education level: Not on file   Occupational History     Not on file   Tobacco Use     Smoking status: Former Smoker     Packs/day: 1.00     Years: 30.00     Pack years: 30.00     Types: Cigarettes, Cigarettes, Cigarettes     Quit date: 2003     Years since quittin.1     Smokeless tobacco: Never Used     Tobacco comment: No passive exposure   Substance and Sexual Activity     Alcohol use: No     Drug use: No     Sexual activity: Yes     Partners: Male   Other Topics Concern     Parent/sibling w/ CABG, MI or  angioplasty before 65F 55M? Not Asked   Social History Narrative    2017 The patient lives with her daughter-in-law (who is present), , son, and 2 grandchildren (total of 6 people). [place].     Social Determinants of Health     Financial Resource Strain: Not on file   Food Insecurity: Not on file   Transportation Needs: Not on file   Physical Activity: Not on file   Stress: Not on file   Social Connections: Not on file   Intimate Partner Violence: Not on file   Housing Stability: Not on file     Sleep History:  Poorly restorative.  Exercise History:  Walks with cane    Review of Systems:        12 point review of systems otherwise negative        Family History:  Family History   Problem Relation Age of Onset     Other - See Comments Mother          of an intestinal problem     Ulcers Father          of gastritis     Breast Cancer No family hx of          Allergies:  No Known Allergies  Medications:  Current Outpatient Medications   Medication Sig Dispense Refill     acetaminophen (TYLENOL) 500 MG tablet Take 2 tablets (1,000 mg) by mouth every 8 hours 100 tablet 0     albuterol (PROAIR HFA/PROVENTIL HFA/VENTOLIN HFA) 108 (90 Base) MCG/ACT inhaler Inhale 2 puffs into the lungs every 4 hours as needed for shortness of breath / dyspnea 4 g 11     albuterol (PROVENTIL) (2.5 MG/3ML) 0.083% neb solution Inhale 2.5 mg into the lungs every 6 hours as needed        ALLERGY RELIEF 10 MG tablet Take 10 mg by mouth daily       amitriptyline (ELAVIL) 10 MG tablet Take 2 tablets by mouth At Bedtime        ASPIRIN LOW DOSE 81 MG EC tablet Take 81 mg by mouth daily        atorvastatin (LIPITOR) 80 MG tablet TAKE 1 TABLET (80 MG TOTAL) BY MOUTH AT BEDTIME FOR CHOLESTEROL       B-D U/F 31G X 8 MM insulin pen needle USE ONE PEN NEEDLE DAILY AS NEEDED FOR  each 1     colchicine (COLCYRS) 0.6 MG tablet TAKE 1 TABLET (0.6 MG TOTAL) BY MOUTH DAILY       Continuous Blood Gluc  (FREESTYLE MONICA 14 DAY  READER) MICHAEL Uses daily       Continuous Blood Gluc Sensor (FREESTYLE MONICA 14 DAY SENSOR) [facility] USE 1 UNITS AS DIRECTED EVERY 14 (FOURTEEN) DAYS. 2 each 3     CONTOUR NEXT TEST test strip USE 1 EACH AS DIRECTED 3 (THREE) TIMES A DAY.       cyclobenzaprine (FLEXERIL) 5 MG tablet TAKE 1-2 TABLETS (5-10 MG) BY MOUTH 3 TIMES DAILY AS NEEDED FOR MUSCLE SPASMS 30 tablet 3     Dupilumab (DUPIXENT) 200 MG/1.14ML SOSY Inject 200 mg Subcutaneous every 14 days 1.14 mL 6     famotidine (PEPCID) 20 MG tablet Take 1 tablet (20 mg) by mouth 2 times daily 90 tablet 3     fluticasone-vilanterol (BREO ELLIPTA) 200-25 MCG/INH inhaler Inhale 1 puff into the lungs daily 60 each 11     gabapentin (NEURONTIN) 300 MG capsule Take 2 capsules (600 mg) by mouth 3 times daily 180 capsule 3     Global Inject Ease Lancets 30G MISC        guaiFENesin (ROBITUSSIN) 100 MG/5ML liquid Take 10 mLs (200 mg) by mouth every 4 hours as needed for cough 200 mL 1     hydrochlorothiazide (MICROZIDE) 12.5 MG capsule TAKE 1 CAPSULE (12.5 MG TOTAL) BY MOUTH DAILY FOR BLOOD PRESSURE 90 capsule 2     hydrocortisone (CORTAID) 1 % external cream Apply topically 2 times daily 60 g 0     Insulin Aspart FlexPen 100 UNIT/ML SOPN GIVE BEFORE MEALS: FOR PRE-MEAL GLUCOSE: 140-189 GIVE 1 UNIT, 190-239 GIVE 2 UNITS, 240-289 GIVE 3 UNITS, 290-339 GIVE 4 UNITS, =OR>340 GIVE 6 UNITS *84* 15 mL 0     insulin glargine (LANTUS PEN) 100 UNIT/ML pen Inject 24 Units Subcutaneous At Bedtime 15 mL 3     insulin pen needle (32G X 4 MM) 32G X 4 MM miscellaneous Use one pen needles daily or as directed. 200 each 11     ipratropium - albuterol 0.5 mg/2.5 mg/3 mL (DUONEB) 0.5-2.5 (3) MG/3ML neb solution Take 1 vial (3 mLs) by nebulization every 6 hours as needed for shortness of breath / dyspnea or wheezing 240 mL 11     meclizine (ANTIVERT) 25 MG tablet Take 25 mg by mouth daily as needed        metFORMIN (GLUCOPHAGE-XR) 500 MG 24 hr tablet Take 1 tablet (500 mg) by mouth daily (with  dinner) 90 tablet 1     metoprolol tartrate (LOPRESSOR) 25 MG tablet TAKE 1 TABLET (25 MG TOTAL) BY MOUTH 2 (TWO) TIMES A DAY FOR BLOOD PRESSURE       montelukast (SINGULAIR) 10 MG tablet Take 1 tablet (10 mg) by mouth At Bedtime 30 tablet 6     omeprazole (PRILOSEC) 40 MG DR capsule Take 1 capsule (40 mg) by mouth daily 90 capsule 3     ondansetron (ZOFRAN) 4 MG tablet Take 1 tablet (4 mg) by mouth every 8 hours as needed for nausea 30 tablet 1     polyethylene glycol (MIRALAX) powder Take 17 g by mouth daily 510 g 1     polyvinyl alcohol (ARTIFICIAL TEARS) 1.4 % ophthalmic solution Place 1 drop into both eyes as needed for dry eyes 15 mL 3     predniSONE (DELTASONE) 2.5 MG tablet Take 2.5 mg by mouth daily        sucralfate (CARAFATE) 1 GM tablet TAKE 1 TABLET (1 G TOTAL) BY MOUTH 4 (FOUR) TIMES A DAY BEFORE MEALS AND AT BEDTIME. TAKE 1 HOUR PRIOR TO MEALS 120 tablet 11     tiotropium (SPIRIVA RESPIMAT) 2.5 MCG/ACT inhalation aerosol Inhale 2 puffs into the lungs daily 4 g 1     diclofenac (VOLTAREN) 1 % topical gel Apply 2 g topically 4 times daily (Patient not taking: Reported on 2/17/2022) 50 g 1     furosemide (LASIX) 20 MG tablet Take 1 tablet (20 mg) by mouth daily for 10 days 10 tablet 1       Objective:   Wt Readings from Last 3 Encounters:   02/17/22 59 kg (130 lb)   02/07/22 59.9 kg (132 lb)   02/04/22 59.5 kg (131 lb 2 oz)     Vital signs:  /76 (BP Location: Left arm, Patient Position: Sitting, Cuff Size: Adult Regular)   Pulse 88   Resp 16   Wt 59 kg (130 lb)   BMI 22.31 kg/m        Physical Exam:    General Appearance : Awake, Alert, No acute distress  HEENT: No Scleral icterus; the mucous membranes were pink and moist.  Conjunctivae not injected  Neck:  No cervical bruits, jugular venous distention, or thyromegaly   Chest: The spine was straight. Chest wall symmetric  Lungs: Respirations unlabored; the lungs are clear to auscultation.  Widely scattered wheezing  Cardiovascular:   Normal  point of maximal impulse.  Auscultation reveals normal first and second heart sounds with no murmurs, rubs, or gallops.  Carotid, radial, and dorsalis pedal pulses are intact and symmetric.  Abdomen: No organomegaly, masses, bruits, or tenderness. Bowels sounds are present  Extremities:  No clubbing, cyanosis.  No edema  Skin: No rash, bruising  Musculoskeletal: No tenderness.  Neurologic: Alert and oriented ×3. Speech is fluent.    Lab Results:  LIPIDS:  Lab Results   Component Value Date    CHOL 142 11/30/2021    CHOL 105 05/06/2021    CHOL 116 01/14/2021     Lab Results   Component Value Date    HDL 58 11/30/2021    HDL 41 (L) 05/06/2021    HDL 39 (L) 01/14/2021     Lab Results   Component Value Date    LDL 48 11/30/2021    LDL 43 05/06/2021    LDL 52 01/14/2021     Lab Results   Component Value Date    TRIG 180 (H) 11/30/2021    TRIG 103 05/06/2021    TRIG 124 01/14/2021     Cardiac Imaging Studies:  Echocardiogram 1/2021:    When compared to the previous study dated 8/14/2020, no significant change.    Normal left ventricular size, wall thickness and wall motion. Left ventricle ejection fraction is normal. The calculated left ventricular ejection fraction is 57%.    Normal right ventricular size and systolic function.    No obvious valvular disease.     CTA chest PE run 8/2020:  1.  No evidence of pulmonary embolus.   2.  Moderate to large amount of airspace infiltrate involving the inferior half of the right lower lobe, compatible with pneumonia.   3.  Hepatic steatosis.        Cardiac stress MRI 7/2019:  Report still unavailable in [facility], but shows no evidence of ischemia or infarction with normal ejection fraction    24-hour Holter monitor 8/2019 showed no ventricular ectopy and 2 PACs.          KATHERINE MORENO MD [facility]  [phone]    This note created using Dragon voice recognition software.  Sound alike errors may have escaped editing.

## 2022-02-17 NOTE — PATIENT INSTRUCTIONS
1. Take only 81 mg of aspirin daily.  2. Stop clopidogrel  3. Start Pepcid 20 mg twice daily  4. Try to walk 20 minutes each day.

## 2022-02-28 DIAGNOSIS — J45.50 SEVERE PERSISTENT ASTHMA WITHOUT COMPLICATION (H): ICD-10-CM

## 2022-02-28 RX ORDER — DUPILUMAB 200 MG/1.14ML
INJECTION, SOLUTION SUBCUTANEOUS
Qty: 2.28 ML | Refills: 1 | Status: SHIPPED | OUTPATIENT
Start: 2022-02-28 | End: 2022-05-04

## 2022-03-04 ENCOUNTER — TELEPHONE (OUTPATIENT)
Dept: PULMONOLOGY | Facility: OTHER | Age: 52
End: 2022-03-04
Payer: COMMERCIAL

## 2022-03-04 DIAGNOSIS — J45.901 ASTHMA EXACERBATION: Primary | ICD-10-CM

## 2022-03-04 RX ORDER — PREDNISONE 20 MG/1
TABLET ORAL
Qty: 10 TABLET | Refills: 0 | Status: SHIPPED | OUTPATIENT
Start: 2022-03-04 | End: 2022-04-18

## 2022-03-04 NOTE — TELEPHONE ENCOUNTER
Phone call from patient's daughter-in-law.  States that Kulwant has had a cough for two days with some sob.     No other symptoms, no fever, cough is dry, no phlegm.     Will send Rx for prednisone 40mg daily x 5 days per action plan.

## 2022-03-05 DIAGNOSIS — Z76.0 ENCOUNTER FOR MEDICATION REFILL: Primary | ICD-10-CM

## 2022-03-07 RX ORDER — ASPIRIN 81 MG/1
TABLET, COATED ORAL
Qty: 90 TABLET | Refills: 3 | Status: SHIPPED | OUTPATIENT
Start: 2022-03-07 | End: 2023-02-22

## 2022-03-10 ENCOUNTER — APPOINTMENT (OUTPATIENT)
Dept: RADIOLOGY | Facility: HOSPITAL | Age: 52
End: 2022-03-10
Attending: EMERGENCY MEDICINE
Payer: COMMERCIAL

## 2022-03-10 ENCOUNTER — HOSPITAL ENCOUNTER (EMERGENCY)
Facility: HOSPITAL | Age: 52
Discharge: HOME | End: 2022-03-10
Attending: FAMILY MEDICINE | Admitting: FAMILY MEDICINE
Payer: COMMERCIAL

## 2022-03-10 ENCOUNTER — NURSE TRIAGE (OUTPATIENT)
Dept: NURSING | Facility: CLINIC | Age: 52
End: 2022-03-10
Payer: COMMERCIAL

## 2022-03-10 VITALS
BODY MASS INDEX: 22.53 KG/M2 | TEMPERATURE: 97.7 F | OXYGEN SATURATION: 97 % | HEIGHT: 64 IN | RESPIRATION RATE: 16 BRPM | HEART RATE: 79 BPM | WEIGHT: 132 LBS | SYSTOLIC BLOOD PRESSURE: 115 MMHG | DIASTOLIC BLOOD PRESSURE: 72 MMHG

## 2022-03-10 DIAGNOSIS — E11.65 TYPE 2 DIABETES MELLITUS WITH HYPERGLYCEMIA, WITH LONG-TERM CURRENT USE OF INSULIN (H): ICD-10-CM

## 2022-03-10 DIAGNOSIS — Z79.4 TYPE 2 DIABETES MELLITUS TREATED WITH INSULIN (H): ICD-10-CM

## 2022-03-10 DIAGNOSIS — Z79.4 TYPE 2 DIABETES MELLITUS WITH HYPERGLYCEMIA, WITH LONG-TERM CURRENT USE OF INSULIN (H): ICD-10-CM

## 2022-03-10 DIAGNOSIS — E11.9 TYPE 2 DIABETES MELLITUS TREATED WITH INSULIN (H): ICD-10-CM

## 2022-03-10 LAB
ALBUMIN UR-MCNC: NEGATIVE MG/DL
ANION GAP SERPL CALCULATED.3IONS-SCNC: 10 MMOL/L (ref 5–18)
APPEARANCE UR: CLEAR
BASE EXCESS BLDV CALC-SCNC: 5.4 MMOL/L
BASOPHILS # BLD AUTO: 0.1 10E3/UL (ref 0–0.2)
BASOPHILS NFR BLD AUTO: 1 %
BILIRUB UR QL STRIP: NEGATIVE
BUN SERPL-MCNC: 12 MG/DL (ref 8–22)
CALCIUM SERPL-MCNC: 9.2 MG/DL (ref 8.5–10.5)
CHLORIDE BLD-SCNC: 99 MMOL/L (ref 98–107)
CO2 SERPL-SCNC: 25 MMOL/L (ref 22–31)
COLOR UR AUTO: COLORLESS
CREAT SERPL-MCNC: 0.79 MG/DL (ref 0.6–1.1)
EOSINOPHIL # BLD AUTO: 0.7 10E3/UL (ref 0–0.7)
EOSINOPHIL NFR BLD AUTO: 11 %
ERYTHROCYTE [DISTWIDTH] IN BLOOD BY AUTOMATED COUNT: 14.1 % (ref 10–15)
GFR SERPL CREATININE-BSD FRML MDRD: 88 ML/MIN/1.73M2
GLUCOSE BLD-MCNC: 422 MG/DL (ref 70–125)
GLUCOSE BLDC GLUCOMTR-MCNC: 366 MG/DL (ref 70–99)
GLUCOSE BLDC GLUCOMTR-MCNC: 403 MG/DL (ref 70–99)
GLUCOSE BLDC GLUCOMTR-MCNC: 414 MG/DL (ref 70–99)
GLUCOSE UR STRIP-MCNC: >1000 MG/DL
HCO3 BLDV-SCNC: 27 MMOL/L (ref 24–30)
HCT VFR BLD AUTO: 42.7 % (ref 35–47)
HGB BLD-MCNC: 13.4 G/DL (ref 11.7–15.7)
HGB UR QL STRIP: NEGATIVE
HOLD SPECIMEN: NORMAL
HOLD SPECIMEN: NORMAL
IMM GRANULOCYTES # BLD: 0 10E3/UL
IMM GRANULOCYTES NFR BLD: 0 %
KETONES UR STRIP-MCNC: NEGATIVE MG/DL
LEUKOCYTE ESTERASE UR QL STRIP: ABNORMAL
LYMPHOCYTES # BLD AUTO: 1.7 10E3/UL (ref 0.8–5.3)
LYMPHOCYTES NFR BLD AUTO: 25 %
MCH RBC QN AUTO: 27.3 PG (ref 26.5–33)
MCHC RBC AUTO-ENTMCNC: 31.4 G/DL (ref 31.5–36.5)
MCV RBC AUTO: 87 FL (ref 78–100)
MONOCYTES # BLD AUTO: 0.5 10E3/UL (ref 0–1.3)
MONOCYTES NFR BLD AUTO: 7 %
NEUTROPHILS # BLD AUTO: 3.8 10E3/UL (ref 1.6–8.3)
NEUTROPHILS NFR BLD AUTO: 56 %
NITRATE UR QL: NEGATIVE
NRBC # BLD AUTO: 0 10E3/UL
NRBC BLD AUTO-RTO: 0 /100
OXYHGB MFR BLDV: 24.6 % (ref 70–75)
PCO2 BLDV: 51 MM HG (ref 35–50)
PH BLDV: 7.38 [PH] (ref 7.35–7.45)
PH UR STRIP: 5 [PH] (ref 5–7)
PLATELET # BLD AUTO: 203 10E3/UL (ref 150–450)
PO2 BLDV: 19 MM HG (ref 25–47)
POTASSIUM BLD-SCNC: 4.1 MMOL/L (ref 3.5–5)
RBC # BLD AUTO: 4.91 10E6/UL (ref 3.8–5.2)
RBC URINE: 1 /HPF
SAO2 % BLDV: 25 % (ref 70–75)
SODIUM SERPL-SCNC: 134 MMOL/L (ref 136–145)
SP GR UR STRIP: 1.02 (ref 1–1.03)
SQUAMOUS EPITHELIAL: 1 /HPF
TROPONIN I SERPL-MCNC: <0.01 NG/ML (ref 0–0.29)
UROBILINOGEN UR STRIP-MCNC: <2 MG/DL
WBC # BLD AUTO: 6.7 10E3/UL (ref 4–11)
WBC URINE: 1 /HPF

## 2022-03-10 PROCEDURE — 80048 BASIC METABOLIC PNL TOTAL CA: CPT | Performed by: EMERGENCY MEDICINE

## 2022-03-10 PROCEDURE — 82805 BLOOD GASES W/O2 SATURATION: CPT | Performed by: EMERGENCY MEDICINE

## 2022-03-10 PROCEDURE — 250N000012 HC RX MED GY IP 250 OP 636 PS 637: Performed by: FAMILY MEDICINE

## 2022-03-10 PROCEDURE — 258N000003 HC RX IP 258 OP 636: Performed by: EMERGENCY MEDICINE

## 2022-03-10 PROCEDURE — 71046 X-RAY EXAM CHEST 2 VIEWS: CPT

## 2022-03-10 PROCEDURE — 99285 EMERGENCY DEPT VISIT HI MDM: CPT | Mod: 25

## 2022-03-10 PROCEDURE — 81001 URINALYSIS AUTO W/SCOPE: CPT | Performed by: EMERGENCY MEDICINE

## 2022-03-10 PROCEDURE — 84484 ASSAY OF TROPONIN QUANT: CPT | Performed by: EMERGENCY MEDICINE

## 2022-03-10 PROCEDURE — 258N000003 HC RX IP 258 OP 636: Performed by: FAMILY MEDICINE

## 2022-03-10 PROCEDURE — 36415 COLL VENOUS BLD VENIPUNCTURE: CPT | Performed by: EMERGENCY MEDICINE

## 2022-03-10 PROCEDURE — 96374 THER/PROPH/DIAG INJ IV PUSH: CPT

## 2022-03-10 PROCEDURE — 93005 ELECTROCARDIOGRAM TRACING: CPT | Performed by: EMERGENCY MEDICINE

## 2022-03-10 PROCEDURE — 96361 HYDRATE IV INFUSION ADD-ON: CPT

## 2022-03-10 PROCEDURE — 85025 COMPLETE CBC W/AUTO DIFF WBC: CPT | Performed by: EMERGENCY MEDICINE

## 2022-03-10 RX ADMIN — SODIUM CHLORIDE 1000 ML: 9 INJECTION, SOLUTION INTRAVENOUS at 11:32

## 2022-03-10 RX ADMIN — SODIUM CHLORIDE 4 UNITS: 9 INJECTION, SOLUTION INTRAVENOUS at 13:15

## 2022-03-10 ASSESSMENT — ENCOUNTER SYMPTOMS
DIZZINESS: 1
NAUSEA: 0
VOMITING: 0
DYSURIA: 0

## 2022-03-10 NOTE — ED PROVIDER NOTES
EMERGENCY DEPARTMENT ENCOUNTER      NAME: Kulwant Amin  AGE: 52 year old female  YOB: 1970  MRN: [id number]  EVALUATION DATE & TIME: 3/10/2022 12:11 PM    PCP: Adrien Cardoza    ED PROVIDER: Adeel Brown M.D.    Chief Complaint   Patient presents with     Hyperglycemia       FINAL IMPRESSION:  1. Type 2 diabetes mellitus with hyperglycemia, with long-term current use of insulin (H)    2. Type 2 diabetes mellitus treated with insulin (H)        ED COURSE & MEDICAL DECISION MAKING:    Pertinent Labs & Imaging studies personally reviewed and interpreted by me. (See chart for details)  12:22 PM Patient seen and examined, prior records reviewed.  Patient here with increased blood sugars this week.  Patient denies any symptoms other than some lightheadedness.  Labs done from triage demonstrate hyperglycemia without evidence for DKA.  1:04 PM review of chart shows the patient was started on prednisone 5 days ago which is likely the cause of her increased sugars.  She has done with this burst and so sugars should improve.  Follow-up with primary care next week.  1:19 PM further conversation with patient's daughter, it sounds like her sugars were high before she started prednisone.  Additionally, they have already increased her insulin glargine to 30 units, review of chart shows that she takes 10 in the morning and 20 at night.  We will increase this to 10 in the morning and 24 units in the evening and patient can follow-up with primary care next week.  Daughter does not seem to be familiar with diabetic diet and teaching in this area might be beneficial.     At the conclusion of the encounter I discussed the results of all of the tests and the disposition. The questions were answered. The patient or family acknowledged understanding and was agreeable with the care plan.     PROCEDURES:   Procedures    MEDICATIONS GIVEN IN THE EMERGENCY:  Medications   0.9% sodium chloride BOLUS (1,000 mLs Intravenous New  Bag 3/10/22 1132)   insulin regular 1 unit/mL injection 4 Units (4 Units Intravenous Given 3/10/22 1315)       NEW PRESCRIPTIONS STARTED AT TODAY'S ER VISIT  Current Discharge Medication List          =================================================================    HPI    Patient information was obtained from: Patient (Daugther interpreted)      Kulwant Amin is a 52 year old female with a pertinent history of TB lung (latent), arterial ischemic stroke, COPD, asthma, HTN, hyperlipidemia, DM II  who presents to this ED by private vehicle with family for evaluation of hyperglycemia.     Patient has a history of diabetes mellitus and for the past week she has had high blood sugars with the past few days being in the 500s. She endorses taking medications and insulin as directed. However, daughter does not believe patient is following a low sugar diet. Patient mentions some dizziness. No nausea, vomiting, or dysuria. Patient denies additional medical concerns or complaints at this time.         REVIEW OF SYSTEMS   Review of Systems   Gastrointestinal: Negative for nausea and vomiting.   Genitourinary: Negative for dysuria.   Neurological: Positive for dizziness.      All other systems reviewed and negative    PAST MEDICAL HISTORY:  Past Medical History:   Diagnosis Date     Acute asthma exacerbation 1/6/2020     Anxiety      Arthritis      Asthma exacerbation 11/19/2015     Chronic obstructive pulmonary disease, unspecified COPD type (H) 2/8/2021     COPD (chronic obstructive pulmonary disease) (H)      Coronary artery disease due to lipid rich plaque 1/25/2018     Depression      Dyspnea 4/26/2013     Epigastric pain 12/15/2021     Essential hypertension 4/14/2017     GERD (gastroesophageal reflux disease)      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     Lower GI bleeding      Nonspecific (abnormal) findings on radiological and other examination of other intrathoracic organs 4/17/2012     Pneumonia      TB lung, latent     9  mos INH       PAST SURGICAL HISTORY:  Past Surgical History:   Procedure Laterality Date     CORONARY STENT PLACEMENT  2018     CV CORONARY ANGIOGRAM N/A 1/25/2018    Procedure: Coronary Angiogram;  Surgeon: Angelo Serrano MD;  Location: [facility] Cath Lab;  Service:      KS ESOPHAGOGASTRODUODENOSCOPY TRANSORAL DIAGNOSTIC N/A 12/10/2018    Procedure: ESOPHAGOGASTRODUODENOSCOPY (EGD);  Surgeon: Eddie Renteria MD;  Location: [facility];  Service: Gastroenterology     KS ESOPHAGOGASTRODUODENOSCOPY TRANSORAL DIAGNOSTIC N/A 12/3/2020    Procedure: ESOPHAGOGASTRODUODENOSCOPY (EGD) with biospies ;  Surgeon: Avi Crow MD;  Location: [facility];  Service: Gastroenterology     [facility] COLONOSCOPY W/WO BRUSH/WASH N/A 12/10/2018    Procedure: COLONOSCOPY with polypectomy using biopsy forceps;  Surgeon: Eddie Renteria MD;  Location: [facility];  Service: Gastroenterology       CURRENT MEDICATIONS:    No current facility-administered medications for this encounter.     Current Outpatient Medications   Medication     insulin glargine (LANTUS PEN) 100 UNIT/ML pen     acetaminophen (TYLENOL) 500 MG tablet     albuterol (PROAIR HFA/PROVENTIL HFA/VENTOLIN HFA) 108 (90 Base) MCG/ACT inhaler     albuterol (PROVENTIL) (2.5 MG/3ML) 0.083% neb solution     ALLERGY RELIEF 10 MG tablet     amitriptyline (ELAVIL) 10 MG tablet     ASPIRIN LOW DOSE 81 MG EC tablet     atorvastatin (LIPITOR) 80 MG tablet     B-D U/F 31G X 8 MM insulin pen needle     colchicine (COLCYRS) 0.6 MG tablet     Continuous Blood Gluc  (FREESTYLE MONICA 14 DAY READER) MICHAEL     Continuous Blood Gluc Sensor (FREESTYLE MONICA 14 DAY SENSOR) MISC     CONTOUR NEXT TEST test strip     cyclobenzaprine (FLEXERIL) 5 MG tablet     diclofenac (VOLTAREN) 1 % topical gel     DUPIXENT 200 MG/1.14ML SOSY     famotidine (PEPCID) 20 MG tablet     fluticasone-vilanterol (BREO ELLIPTA) 200-25 MCG/INH inhaler     furosemide (LASIX) 20 MG tablet     gabapentin  (NEURONTIN) 300 MG capsule     Global Inject Ease Lancets 30G MISC     guaiFENesin (ROBITUSSIN) 100 MG/5ML liquid     hydrochlorothiazide (MICROZIDE) 12.5 MG capsule     hydrocortisone (CORTAID) 1 % external cream     Insulin Aspart FlexPen 100 UNIT/ML SOPN     insulin pen needle (32G X 4 MM) 32G X 4 MM miscellaneous     ipratropium - albuterol 0.5 mg/2.5 mg/3 mL (DUONEB) 0.5-2.5 (3) MG/3ML neb solution     meclizine (ANTIVERT) 25 MG tablet     metFORMIN (GLUCOPHAGE-XR) 500 MG 24 hr tablet     metoprolol tartrate (LOPRESSOR) 25 MG tablet     montelukast (SINGULAIR) 10 MG tablet     omeprazole (PRILOSEC) 40 MG DR capsule     ondansetron (ZOFRAN) 4 MG tablet     polyethylene glycol (MIRALAX) powder     polyvinyl alcohol (ARTIFICIAL TEARS) 1.4 % ophthalmic solution     predniSONE (DELTASONE) 2.5 MG tablet     predniSONE (DELTASONE) 20 MG tablet     sucralfate (CARAFATE) 1 GM tablet     tiotropium (SPIRIVA RESPIMAT) 2.5 MCG/ACT inhalation aerosol       ALLERGIES:  No Known Allergies    FAMILY HISTORY:  Family History   Problem Relation Age of Onset     Other - See Comments Mother          of an intestinal problem     Ulcers Father          of gastritis     Breast Cancer No family hx of        SOCIAL HISTORY:   Social History     Socioeconomic History     Marital status:      Spouse name: Not on file     Number of children: Not on file     Years of education: Not on file     Highest education level: Not on file   Occupational History     Not on file   Tobacco Use     Smoking status: Former Smoker     Packs/day: 1.00     Years: 30.00     Pack years: 30.00     Types: Cigarettes, Cigarettes, Cigarettes     Quit date: 2003     Years since quittin.2     Smokeless tobacco: Never Used     Tobacco comment: No passive exposure   Substance and Sexual Activity     Alcohol use: No     Drug use: No     Sexual activity: Yes     Partners: Male   Other Topics Concern     Parent/sibling w/ CABG, MI or angioplasty  "before 65F 55M? Not Asked   Social History Narrative    12/22/2017 The patient lives with her daughter-in-law (who is present), , son, and 2 grandchildren (total of 6 people). [place].     Social Determinants of Health     Financial Resource Strain: Not on file   Food Insecurity: Not on file   Transportation Needs: Not on file   Physical Activity: Not on file   Stress: Not on file   Social Connections: Not on file   Intimate Partner Violence: Not on file   Housing Stability: Not on file       VITALS:  /84   Pulse 97   Temp 97.7  F (36.5  C) (Oral)   Resp 16   Ht 1.626 m (5' 4\")   Wt 59.9 kg (132 lb)   SpO2 97%   BMI 22.66 kg/m      PHYSICAL EXAM:  Physical Exam  Vitals and nursing note reviewed.   Constitutional:       Appearance: Normal appearance.   HENT:      Head: Normocephalic and atraumatic.      Right Ear: External ear normal.      Left Ear: External ear normal.      Nose: Nose normal.      Mouth/Throat:      Mouth: Mucous membranes are moist.   Eyes:      Extraocular Movements: Extraocular movements intact.      Conjunctiva/sclera: Conjunctivae normal.      Pupils: Pupils are equal, round, and reactive to light.   Cardiovascular:      Rate and Rhythm: Normal rate and regular rhythm.   Pulmonary:      Effort: Pulmonary effort is normal.      Breath sounds: Normal breath sounds. No wheezing or rales.   Abdominal:      General: Abdomen is flat. There is no distension.      Palpations: Abdomen is soft.      Tenderness: There is no abdominal tenderness. There is no guarding.   Musculoskeletal:         General: Normal range of motion.      Cervical back: Normal range of motion and neck supple.      Right lower leg: No edema.      Left lower leg: No edema.   Lymphadenopathy:      Cervical: No cervical adenopathy.   Skin:     General: Skin is warm and dry.   Neurological:      General: No focal deficit present.      Mental Status: She is alert and oriented to person, place, and time. Mental " status is at baseline.      Comments: No gross focal neurologic deficits   Psychiatric:         Mood and Affect: Mood normal.         Behavior: Behavior normal.         Thought Content: Thought content normal.          LAB:  All pertinent labs reviewed and interpreted.  Results for orders placed or performed during the hospital encounter of 03/10/22   XR Chest 2 Views    Impression    IMPRESSION: Heart and mediastinal size normal. No acute airspace opacities. No pleural effusion or pneumothorax. There is a benign 2 mm granuloma involving the mid right lung.   Glucose by meter   Result Value Ref Range    GLUCOSE BY METER POCT 403 (H) 70 - 99 mg/dL   Basic metabolic panel   Result Value Ref Range    Sodium 134 (L) 136 - 145 mmol/L    Potassium 4.1 3.5 - 5.0 mmol/L    Chloride 99 98 - 107 mmol/L    Carbon Dioxide (CO2) 25 22 - 31 mmol/L    Anion Gap 10 5 - 18 mmol/L    Urea Nitrogen 12 8 - 22 mg/dL    Creatinine 0.79 0.60 - 1.10 mg/dL    Calcium 9.2 8.5 - 10.5 mg/dL    Glucose 422 (HH) 70 - 125 mg/dL    GFR Estimate 88 >60 mL/min/1.73m2   Blood gas venous   Result Value Ref Range    pH Venous 7.38 7.35 - 7.45    pCO2 Venous 51 (H) 35 - 50 mm Hg    pO2 Venous 19 (L) 25 - 47 mm Hg    Bicarbonate Venous 27 24 - 30 mmol/L    Base Excess/Deficit (+/-) 5.4   mmol/L    Oxyhemoglobin Venous 24.6 (L) 70.0 - 75.0 %    O2 Sat, Venous 25.0 (L) 70.0 - 75.0 %   UA with Microscopic reflex to Culture    Specimen: Urine, Clean Catch   Result Value Ref Range    Color Urine Colorless Colorless, Straw, Light Yellow, Yellow    Appearance Urine Clear Clear    Glucose Urine >1000 (A) Negative mg/dL    Bilirubin Urine Negative Negative    Ketones Urine Negative Negative mg/dL    Specific Gravity Urine 1.024 1.001 - 1.030    Blood Urine Negative Negative    pH Urine 5.0 5.0 - 7.0    Protein Albumin Urine Negative Negative mg/dL    Urobilinogen Urine <2.0 <2.0 mg/dL    Nitrite Urine Negative Negative    Leukocyte Esterase Urine 25 Shannan/uL (A)  Negative    RBC Urine 1 <=2 /HPF    WBC Urine 1 <=5 /HPF    Squamous Epithelials Urine 1 <=1 /HPF   Result Value Ref Range    Troponin I <0.01 0.00 - 0.29 ng/mL   CBC with platelets and differential   Result Value Ref Range    WBC Count 6.7 4.0 - 11.0 10e3/uL    RBC Count 4.91 3.80 - 5.20 10e6/uL    Hemoglobin 13.4 11.7 - 15.7 g/dL    Hematocrit 42.7 35.0 - 47.0 %    MCV 87 78 - 100 fL    MCH 27.3 26.5 - 33.0 pg    MCHC 31.4 (L) 31.5 - 36.5 g/dL    RDW 14.1 10.0 - 15.0 %    Platelet Count 203 150 - 450 10e3/uL    % Neutrophils 56 %    % Lymphocytes 25 %    % Monocytes 7 %    % Eosinophils 11 %    % Basophils 1 %    % Immature Granulocytes 0 %    NRBCs per 100 WBC 0 <1 /100    Absolute Neutrophils 3.8 1.6 - 8.3 10e3/uL    Absolute Lymphocytes 1.7 0.8 - 5.3 10e3/uL    Absolute Monocytes 0.5 0.0 - 1.3 10e3/uL    Absolute Eosinophils 0.7 0.0 - 0.7 10e3/uL    Absolute Basophils 0.1 0.0 - 0.2 10e3/uL    Absolute Immature Granulocytes 0.0 <=0.4 10e3/uL    Absolute NRBCs 0.0 10e3/uL   Extra Blue Top Tube   Result Value Ref Range    Hold Specimen JIC    Extra Red Top Tube   Result Value Ref Range    Hold Specimen JIC        RADIOLOGY:  Reviewed all pertinent imaging. Please see official radiology report.  XR Chest 2 Views   Preliminary Result   IMPRESSION: Heart and mediastinal size normal. No acute airspace opacities. No pleural effusion or pneumothorax. There is a benign 2 mm granuloma involving the mid right lung.          EKG:    Performed at: 11:39 AM  Impression: Pulmonary disease pattern, otherwise normal  Rate: 80  Rhythm: Sinus  Axis: Normal  VT Interval: 154  QRS Interval: 88  QTc Interval: 442  ST Changes: No acute ischemic changes  Comparison: December 25    I have independently reviewed and interpreted the EKG(s) documented above.    LETA, Madison Jeter, am serving as a scribe to document services personally performed by Dr. Brown based on my observation and the provider's statements to me. Adeel GILLETTE  MD Kevin attest that Madison Jeter is acting in a scribe capacity, has observed my performance of the services and has documented them in accordance with my direction.    Adeel Brown M.D.  Emergency Medicine  [facility] EMERGENCY DEPARTMENT  [address] [place] [id number]  [phone]  Dept: [phone]       Adeel Brown MD  03/10/22 3560       Adeel Brown MD  03/10/22 0599

## 2022-03-10 NOTE — ED PROVIDER NOTES
"ED Triage Provider Note  [facility]  Encounter Date: Mar 10, 2022      The patient was seen in triage to expedite ED workup.     History:  Chief Complaint   Patient presents with     Hyperglycemia     Kulwant Amin is a 52 year old female who presents to the ED with high blood sugar readings this week 400-600 range with lightheaded sensation. No loss of sensation or strength ,no fever, no cough.     Review of Systems:  No rash    Vitals:  /84   Pulse 97   Temp 97.7  F (36.5  C) (Oral)   Resp 16   Ht 1.626 m (5' 4\")   Wt 59.9 kg (132 lb)   SpO2 97%   BMI 22.66 kg/m      Brief Exam:  Constitutional: Awake, alert, no acute distress apparent  HEENT: Atraumatic, normocephalic  PSYCH: Alert, oriented and good historian    Medical Decision Making:  Patient arriving to the ED with problem as above. A medical screening exam was performed. Orders initiated from triage  to expedite ED workup.             Ronna Ramirez MD  03/10/22  Emergency Medicine  [facility] EMERGENCY DEPARTMENT  [address] [place] [id number]  [phone]  Dept: [phone]       Ronna Ramirez MD  03/10/22 1112    "

## 2022-03-10 NOTE — ED TRIAGE NOTES
Patient has had high blood sugars, 500's, the past few days.  Brought in by here daughter in law who lives with her and is interpreting. .  Pt reports some dizziness the past few days.

## 2022-03-10 NOTE — TELEPHONE ENCOUNTER
Billy daughter in law calling.   for [place] offered and declined.  Billy calling with patient regarding high blood sugar readings.     259 this morning last night it was 441.   3/10/2022 morning 531 fasting   3/9/2022 402 morning and evening 454  3/8/2022  453 morning and evening 572 and 600.     Patient is currently reporting that she is feeling confused.   Protocol recommends .  Patient is refusing this but is willing to have family take her in their car to [place]'s ED now.   Rowan Rivas RN   03/10/22 9:16 AM  [facility] Nurse Advisor    COVID 19 Nurse Triage Plan/Patient Instructions    Please be aware that novel coronavirus (COVID-19) may be circulating in the community. If you develop symptoms such as fever, cough, or SOB or if you have concerns about the presence of another infection including coronavirus (COVID-19), please contact your health care provider or visit [email or web address].     Disposition/Instructions    ED Visit recommended. Follow protocol based instructions.     Bring Your Own Device:  Please also bring your smart device(s) (smart phones, tablets, laptops) and their charging cables for your personal use and to communicate with your care team during your visit.    Thank you for taking steps to prevent the spread of this virus.  o Limit your contact with others.  o Wear a simple mask to cover your cough.  o Wash your hands well and often.    Resources    [facility]: About COVID-19: [email or web address]    CDC: What to Do If You're Sick: www.cdc.gov/coronavirus/2019-ncov/about/steps-when-sick.html    CDC: Ending Home Isolation: www.cdc.gov/coronavirus/2019-ncov/hcp/disposition-in-home-patients.html     CDC: Caring for Someone: www.cdc.gov/coronavirus/2019-ncov/if-you-are-sick/care-for-someone.html     MD: Interim Guidance for Hospital Discharge to Home: www.health.[facility].[place]/diseases/coronavirus/hcp/hospdischarge.pdf    [facility]  [place] clinical trials (COVID-19 research studies): clinicalaffairs.[facility]-clinical-trials     Below are the COVID-19 hotlines at the [place] Department of Health ([facility]). Interpreters are available.   o For health questions: Call 739-684-0655 or 1-492.175.5155 (7 a.m. to 7 p.m.)  o For questions about schools and childcare: Call 641-541-4375 or 1-946.563.8478 (7 a.m. to 7 p.m.)                       Reason for Disposition    Acting confused (e.g., disoriented, slurred speech)    Additional Information    Negative: Unconscious or difficult to awaken    Protocols used: DIABETES - HIGH BLOOD SUGAR-A-OH

## 2022-03-11 ENCOUNTER — MYC MEDICAL ADVICE (OUTPATIENT)
Dept: FAMILY MEDICINE | Facility: CLINIC | Age: 52
End: 2022-03-11
Payer: COMMERCIAL

## 2022-03-11 NOTE — TELEPHONE ENCOUNTER
Writer talked with  in regards to [employer] message.  Relayed information from ER visit yesterday with pt being on Prednisone and Insulin being adjusted by ER Provider.    Dr. Haas recommended that the pt continue to monitor her sugars with the adjustment in Insulin dose and to have family check sugar levels later today to see if the new insulin dose has brought pt's sugars down.  Also to reiterate with family that the Prednisone will cause the sugar levels to be elevated and to follow up with primary provider.  If levels remain elevated later today with adjustment of insulin dose, please let clinic staff know  for further follow up.    Relayed this message via [employer] and advised that if pt starts experiencing and nausea/vomiting, lightheadness, confusion, fruity breath or pt is experiencing any of the symptoms that she experience yesterday prior to her ER visit to seek care again at their nearest ER.    Pt is to follow up with PCP next week per ER Visit.  Advised to schedule appt or if they would like us to do that for them.    Writer relayed above message to pt via [employer].    Sandy ELLIOTT RN  [facility]

## 2022-03-11 NOTE — TELEPHONE ENCOUNTER
Writer responded via [employer] with further questions.    Sandy ELLIOTT RN  [facility]

## 2022-03-15 DIAGNOSIS — Z76.0 ENCOUNTER FOR MEDICATION REFILL: Primary | ICD-10-CM

## 2022-03-15 LAB
ATRIAL RATE - MUSE: 80 BPM
DIASTOLIC BLOOD PRESSURE - MUSE: NORMAL MMHG
INTERPRETATION ECG - MUSE: NORMAL
P AXIS - MUSE: 17 DEGREES
PR INTERVAL - MUSE: 154 MS
QRS DURATION - MUSE: 88 MS
QT - MUSE: 384 MS
QTC - MUSE: 442 MS
R AXIS - MUSE: -46 DEGREES
SYSTOLIC BLOOD PRESSURE - MUSE: NORMAL MMHG
T AXIS - MUSE: 30 DEGREES
VENTRICULAR RATE- MUSE: 80 BPM

## 2022-03-15 RX ORDER — COLCHICINE 0.6 MG/1
TABLET ORAL
Qty: 90 TABLET | Refills: 3 | OUTPATIENT
Start: 2022-03-15

## 2022-03-15 RX ORDER — COLCHICINE 0.6 MG/1
TABLET ORAL
Qty: 90 TABLET | Refills: 2 | OUTPATIENT
Start: 2022-03-15

## 2022-03-17 ENCOUNTER — OFFICE VISIT (OUTPATIENT)
Dept: ALLERGY | Facility: CLINIC | Age: 52
End: 2022-03-17
Payer: COMMERCIAL

## 2022-03-17 VITALS — BODY MASS INDEX: 22.53 KG/M2 | WEIGHT: 132 LBS | HEIGHT: 64 IN | RESPIRATION RATE: 16 BRPM

## 2022-03-17 DIAGNOSIS — J45.50 SEVERE PERSISTENT ASTHMA WITHOUT COMPLICATION (H): Primary | ICD-10-CM

## 2022-03-17 PROCEDURE — 99214 OFFICE O/P EST MOD 30 MIN: CPT | Performed by: ALLERGY & IMMUNOLOGY

## 2022-03-17 RX ORDER — DUPILUMAB 300 MG/2ML
300 INJECTION, SOLUTION SUBCUTANEOUS
Qty: 2 ML | Refills: 3 | Status: SHIPPED | OUTPATIENT
Start: 2022-03-17 | End: 2022-08-23

## 2022-03-17 ASSESSMENT — ASTHMA QUESTIONNAIRES
QUESTION_3 LAST FOUR WEEKS HOW OFTEN DID YOUR ASTHMA SYMPTOMS (WHEEZING, COUGHING, SHORTNESS OF BREATH, CHEST TIGHTNESS OR PAIN) WAKE YOU UP AT NIGHT OR EARLIER THAN USUAL IN THE MORNING: FOUR OR MORE NIGHTS A WEEK
QUESTION_2 LAST FOUR WEEKS HOW OFTEN HAVE YOU HAD SHORTNESS OF BREATH: ONCE A DAY
ACT_TOTALSCORE: 11
QUESTION_1 LAST FOUR WEEKS HOW MUCH OF THE TIME DID YOUR ASTHMA KEEP YOU FROM GETTING AS MUCH DONE AT WORK, SCHOOL OR AT HOME: NONE OF THE TIME
ACT_TOTALSCORE: 11
QUESTION_5 LAST FOUR WEEKS HOW WOULD YOU RATE YOUR ASTHMA CONTROL: POORLY CONTROLLED
QUESTION_4 LAST FOUR WEEKS HOW OFTEN HAVE YOU USED YOUR RESCUE INHALER OR NEBULIZER MEDICATION (SUCH AS ALBUTEROL): THREE OR MORE TIMES PER DAY

## 2022-03-17 NOTE — PROGRESS NOTES
"      Subjective       HPI     Chief complaint: Follow-up asthma    History of present illness: This is a pleasant 52-year-old woman I last saw in March 2021.  She has a history of severe persistent asthma versus COPD and is currently on Dupixent 200 mg every 2 weeks.  With this, she feels that her breathing has improved.  She still has some coughing but notes it on day 10 or 11 after taking the Dupixent.  She states she is able to get around better now taking Dupixent.  She follows with pulmonary and is currently taking Breo as well.  She states she is been compliant with the medications.  Her eosinophil count was as high as 1600 recently she had a eosinophilic work-up which was negative.  Most recently her eosinophil count was checked and was 700.  They would like to know if they should continue this medication.  She had no difficulty with Dupixent injections and cells.    Review of Systems         Objective    Resp 16   Ht 1.626 m (5' 4\")   Wt 59.9 kg (132 lb)   BMI 22.66 kg/m    Body mass index is 22.66 kg/m .  Physical Exam      Gen: Pleasant female not in acute distress  HEENT: Eyes no erythema of the bulbar or palpebral conjunctiva, no edema.  Respiratory: Clear to auscultation bilaterally, no adventitious breath sounds, when she felt her upper airway wheezing.    Skin: No rashes or lesions  Psych: Alert and oriented times 3    Impression report and plan:    1.  Severe persistent asthma      I would like to significant 300 mg every 2 weeks and Dupixent improved.  I think this may help but she has noticed improvement but continues to have some cough, wheeze or shortness of breath.  ACT score today was 11.  I do hear some upper airway wheezing when I listen and I do not see that she has seen ENT previously for vocal cord evaluation.  I will talk with her pulmonologist regarding this.  Otherwise, I would like her to follow-up in 6 months.        "

## 2022-03-17 NOTE — LETTER
"    3/17/2022         RE: Kulwant Amin  [address] [address]        Dear Colleague,    Thank you for referring your patient, Kulwant Amin, to the [facility]. Please see a copy of my visit note below.          Subjective       HPI     Chief complaint: Follow-up asthma    History of present illness: This is a pleasant 52-year-old woman I last saw in March 2021.  She has a history of severe persistent asthma versus COPD and is currently on Dupixent 200 mg every 2 weeks.  With this, she feels that her breathing has improved.  She still has some coughing but notes it on day 10 or 11 after taking the Dupixent.  She states she is able to get around better now taking Dupixent.  She follows with pulmonary and is currently taking Breo as well.  She states she is been compliant with the medications.  Her eosinophil count was as high as 1600 recently she had a eosinophilic work-up which was negative.  Most recently her eosinophil count was checked and was 700.  They would like to know if they should continue this medication.  She had no difficulty with Dupixent injections and cells.    Review of Systems         Objective    Resp 16   Ht 1.626 m (5' 4\")   Wt 59.9 kg (132 lb)   BMI 22.66 kg/m    Body mass index is 22.66 kg/m .  Physical Exam      Gen: Pleasant female not in acute distress  HEENT: Eyes no erythema of the bulbar or palpebral conjunctiva, no edema.  Respiratory: Clear to auscultation bilaterally, no adventitious breath sounds, when she felt her upper airway wheezing.    Skin: No rashes or lesions  Psych: Alert and oriented times 3    Impression report and plan:    1.  Severe persistent asthma      I would like to significant 300 mg every 2 weeks and Dupixent improved.  I think this may help but she has noticed improvement but continues to have some cough, wheeze or shortness of breath.  ACT score today was 11.  I do hear some upper airway wheezing when I listen and I do " not see that she has seen ENT previously for vocal cord evaluation.  I will talk with her pulmonologist regarding this.  Otherwise, I would like her to follow-up in 6 months.            Again, thank you for allowing me to participate in the care of your patient.        Sincerely,        Elizabeth REYNOSO MD

## 2022-03-17 NOTE — Clinical Note
I saw this patient today, and I am going to try can increase Dupixent to 300 mg every 2 weeks.  When I listened to her today, lungs sounded fine, but she had upper airway wheezing.  I didn't see any ENT evaluation.  I might have missed it?  Thanks,   Elizabeth

## 2022-03-18 ENCOUNTER — TELEPHONE (OUTPATIENT)
Dept: ALLERGY | Facility: CLINIC | Age: 52
End: 2022-03-18
Payer: COMMERCIAL

## 2022-03-18 NOTE — TELEPHONE ENCOUNTER
PA Initiation    Medication: Dupixent - PA Pending  Insurance Company: [employer] - Phone [phone] Fax [phone]  Pharmacy Filling the Rx: [facility] - [address] [place] [address]  Filling Pharmacy Phone:    Filling Pharmacy Fax:    Start Date: 3/18/2022    Submitted new PA due to change in dose.     CMM Key: [id number]

## 2022-03-19 ENCOUNTER — HEALTH MAINTENANCE LETTER (OUTPATIENT)
Age: 52
End: 2022-03-19

## 2022-03-21 NOTE — TELEPHONE ENCOUNTER
Prior Authorization Approval    Authorization Effective Date: 2/19/2022  Authorization Expiration Date: 3/21/2023  Medication: Dupixent Pens - Approved  Approved Dose/Quantity: 300mg/2ml pre-filled pens  Reference #: CMM Key: [id number]   Insurance Company: [employer] - Phone [phone] Fax [phone]  Expected CoPay: Unknown - cannot fill at [facility]     CoPay Card Available: No    Foundation Assistance Needed: N/A    Which Pharmacy is filling the prescription (Not needed for infusion/clinic administered): [facility], [place] - [address] [place] [address]  Pharmacy Notified: Released to pharmacy   Patient Notified: Renewal

## 2022-03-25 ENCOUNTER — OFFICE VISIT (OUTPATIENT)
Dept: FAMILY MEDICINE | Facility: CLINIC | Age: 52
End: 2022-03-25
Payer: COMMERCIAL

## 2022-03-25 VITALS
WEIGHT: 131.25 LBS | RESPIRATION RATE: 16 BRPM | HEIGHT: 64 IN | SYSTOLIC BLOOD PRESSURE: 118 MMHG | BODY MASS INDEX: 22.41 KG/M2 | DIASTOLIC BLOOD PRESSURE: 72 MMHG | TEMPERATURE: 98 F | HEART RATE: 88 BPM

## 2022-03-25 DIAGNOSIS — E11.9 TYPE 2 DIABETES MELLITUS TREATED WITHOUT INSULIN (H): Primary | ICD-10-CM

## 2022-03-25 DIAGNOSIS — I25.83 CORONARY ARTERY DISEASE DUE TO LIPID RICH PLAQUE: ICD-10-CM

## 2022-03-25 DIAGNOSIS — I10 PRIMARY HYPERTENSION: ICD-10-CM

## 2022-03-25 DIAGNOSIS — F33.9 RECURRENT MAJOR DEPRESSIVE DISORDER, REMISSION STATUS UNSPECIFIED (H): Chronic | ICD-10-CM

## 2022-03-25 DIAGNOSIS — J44.9 CHRONIC OBSTRUCTIVE PULMONARY DISEASE, UNSPECIFIED COPD TYPE (H): ICD-10-CM

## 2022-03-25 DIAGNOSIS — I25.10 CORONARY ARTERY DISEASE DUE TO LIPID RICH PLAQUE: ICD-10-CM

## 2022-03-25 LAB
HOLD SPECIMEN: NORMAL

## 2022-03-25 PROCEDURE — 80053 COMPREHEN METABOLIC PANEL: CPT | Performed by: FAMILY MEDICINE

## 2022-03-25 PROCEDURE — 99214 OFFICE O/P EST MOD 30 MIN: CPT | Performed by: FAMILY MEDICINE

## 2022-03-25 PROCEDURE — 83036 HEMOGLOBIN GLYCOSYLATED A1C: CPT | Performed by: FAMILY MEDICINE

## 2022-03-25 PROCEDURE — 36415 COLL VENOUS BLD VENIPUNCTURE: CPT | Performed by: FAMILY MEDICINE

## 2022-03-25 PROCEDURE — 80061 LIPID PANEL: CPT | Performed by: FAMILY MEDICINE

## 2022-03-25 NOTE — PROGRESS NOTES
ASSESMENT AND PLAN:  Type 2 diabetes mellitus treated without insulin (H)  Increase Lantus to 10 units a.m. and 24 units p.m.  Restart mealtime insulin NovoLog 5 units before meals.  Check fasting blood sugar in 2 hours after dinner and have a log.  Box given to record blood sugar readings.  Follow-up in 2 weeks.  - Hemoglobin A1c  - Comprehensive metabolic panel (BMP + Alb, Alk Phos, ALT, AST, Total. Bili, TP); Future  - Lipid panel    Recurrent major depressive disorder, remission status unspecified (H)  Seems to be doing okay.    Primary hypertension  Controlled.    Coronary artery disease due to lipid rich plaque  Managed by cardiology.    Chronic obstructive pulmonary disease, unspecified COPD type (H)  Managed by pulmonology.    This transcription uses voice recognition software, which may contain typographical errors.      SUBJECTIVE: Kulwant Amin is a 52-year-old female with multiple chronic medical conditions seeing multiple specialists here for follow-up.   Main concern is ongoing high blood sugar.  She went to the ED 2 weeks ago due to elevated blood sugar in the 600s.  She was using Lantus 10 units in the morning and 20 units at night before the ED visit.  Her Lantus was increased to 10 units in a.m. and 24 units at night but she is still using the same dose before she wants to the hospital, she is not aware of the changes.  She cannot check her blood sugar or take insulin by herself, family members including daughter-in-law and son are helping her.  She checks her blood sugar randomly, the numbers ranges from low 100s to 300s and 400s.  She has NovoLog for mealtime coverage but is not using currently.  She saw Dr. Marie last week, note reviewed.  She is stable from allergy standpoint.  She has appointment with pulmonology coming up soon.    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     Lower GI bleeding      Nonspecific (abnormal) findings on radiological and other examination of other intrathoracic organs 4/17/2012     Pneumonia      TB lung, latent     9 mos INH     Patient Active Problem List   Diagnosis     Pulmonary nodule, right     Environmental allergies     TB lung, latent     COPD (chronic obstructive pulmonary disease)/Asthma      HTN (hypertension)     Esophageal reflux (GERD)     Hyperthyroidism     Cataracts, bilateral     Corneal opacity     Irregular astigmatism     Anxiety     Chronic nonintractable headache, unspecified headache type     Coronary artery disease due to lipid rich plaque     Dizziness     Hyperlipidemia     Microcytic anemia     Moderate major depression (H)     Moderate persistent asthma     Nonspecific reaction to tuberculin skin test without active tuberculosis     Type 2 diabetes mellitus treated with insulin (H)     Unspecified visual loss     SOB (shortness of breath)     GERD (gastroesophageal reflux disease)     Dental caries     S/P coronary artery stent placement     H/O arterial ischemic stroke     Constipation     Dysphagia     Heartburn     Chronic abdominal pain     Infection due to 2019 novel coronavirus     Insomnia       Allergies:  No Known Allergies    History   Smoking Status     Former Smoker     Packs/day: 1.00     Years: 30.00     Types: Cigarettes, Cigarettes, Cigarettes     Quit date: 1/1/2003   Smokeless Tobacco     Never Used     Comment: No passive exposure       Review of systems otherwise negative except as listed in HPI.   History   Smoking Status     Former Smoker     Packs/day:  "1.00     Years: 30.00     Types: Cigarettes, Cigarettes, Cigarettes     Quit date: 1/1/2003   Smokeless Tobacco     Never Used     Comment: No passive exposure       OBJECTICE: /72 (BP Location: Left arm, Patient Position: Sitting, Cuff Size: Adult Regular)   Pulse 88   Temp 98  F (36.7  C) (Oral)   Resp 16   Ht 1.626 m (5' 4\")   Wt 59.5 kg (131 lb 4 oz)   BMI 22.53 kg/m      DATA REVIEWED:  Additional History from Old Records Summarized (2):   Labs Reviewed or Ordered (1):       GEN-alert,  in no apparent distress.  HEENT- neck is supple.  CV-regular rate and rhythm with no murmur.   RESP-no wheezing today  ABDOMEN- Soft , not tender.  EXTREM- No edema.  SKIN-normal        Adrien Cardoza MD   3/25/2022     "

## 2022-03-28 LAB
ALBUMIN SERPL-MCNC: 3.7 G/DL (ref 3.5–5)
ALP SERPL-CCNC: 100 U/L (ref 45–120)
ALT SERPL W P-5'-P-CCNC: 26 U/L (ref 0–45)
ANION GAP SERPL CALCULATED.3IONS-SCNC: 14 MMOL/L (ref 5–18)
AST SERPL W P-5'-P-CCNC: 25 U/L (ref 0–40)
BILIRUB SERPL-MCNC: 0.4 MG/DL (ref 0–1)
BUN SERPL-MCNC: 17 MG/DL (ref 8–22)
CALCIUM SERPL-MCNC: 9.4 MG/DL (ref 8.5–10.5)
CHLORIDE BLD-SCNC: 103 MMOL/L (ref 98–107)
CHOLEST SERPL-MCNC: 145 MG/DL
CO2 SERPL-SCNC: 23 MMOL/L (ref 22–31)
CREAT SERPL-MCNC: 0.7 MG/DL (ref 0.6–1.1)
FASTING STATUS PATIENT QL REPORTED: ABNORMAL
GFR SERPL CREATININE-BSD FRML MDRD: >90 ML/MIN/1.73M2
GLUCOSE BLD-MCNC: 142 MG/DL (ref 70–125)
HBA1C MFR BLD: 10.9 % (ref 0–5.6)
HDLC SERPL-MCNC: 52 MG/DL
LDLC SERPL CALC-MCNC: 54 MG/DL
POTASSIUM BLD-SCNC: 3.8 MMOL/L (ref 3.5–5)
PROT SERPL-MCNC: 6.6 G/DL (ref 6–8)
SODIUM SERPL-SCNC: 140 MMOL/L (ref 136–145)
TRIGL SERPL-MCNC: 197 MG/DL

## 2022-04-01 ENCOUNTER — THERAPY VISIT (OUTPATIENT)
Dept: SLEEP MEDICINE | Facility: CLINIC | Age: 52
End: 2022-04-01
Attending: PHYSICIAN ASSISTANT
Payer: COMMERCIAL

## 2022-04-01 DIAGNOSIS — R06.89 DYSPNEA AND RESPIRATORY ABNORMALITY: ICD-10-CM

## 2022-04-01 DIAGNOSIS — Z72.820 LACK OF ADEQUATE SLEEP: ICD-10-CM

## 2022-04-01 DIAGNOSIS — I25.10 CORONARY ARTERY DISEASE DUE TO LIPID RICH PLAQUE: ICD-10-CM

## 2022-04-01 DIAGNOSIS — R53.83 MALAISE AND FATIGUE: ICD-10-CM

## 2022-04-01 DIAGNOSIS — R53.81 MALAISE AND FATIGUE: ICD-10-CM

## 2022-04-01 DIAGNOSIS — G47.00 INSOMNIA, UNSPECIFIED TYPE: ICD-10-CM

## 2022-04-01 DIAGNOSIS — R07.9 CHEST PAIN, UNSPECIFIED TYPE: ICD-10-CM

## 2022-04-01 DIAGNOSIS — Z86.73 H/O ARTERIAL ISCHEMIC STROKE: Chronic | ICD-10-CM

## 2022-04-01 DIAGNOSIS — R06.00 DYSPNEA AND RESPIRATORY ABNORMALITY: ICD-10-CM

## 2022-04-01 DIAGNOSIS — I10 ESSENTIAL HYPERTENSION: ICD-10-CM

## 2022-04-01 DIAGNOSIS — I25.83 CORONARY ARTERY DISEASE DUE TO LIPID RICH PLAQUE: ICD-10-CM

## 2022-04-01 PROCEDURE — 95810 POLYSOM 6/> YRS 4/> PARAM: CPT | Performed by: INTERNAL MEDICINE

## 2022-04-05 PROBLEM — U07.1 INFECTION DUE TO 2019 NOVEL CORONAVIRUS: Status: RESOLVED | Noted: 2022-01-26 | Resolved: 2022-04-05

## 2022-04-05 PROBLEM — D50.9 MICROCYTIC ANEMIA: Status: RESOLVED | Noted: 2019-11-17 | Resolved: 2022-04-05

## 2022-04-05 PROBLEM — R13.10 DYSPHAGIA: Status: ACTIVE | Noted: 2021-12-15

## 2022-04-05 PROBLEM — R10.9 CHRONIC ABDOMINAL PAIN: Status: ACTIVE | Noted: 2021-12-15

## 2022-04-05 PROBLEM — K59.00 CONSTIPATION: Status: ACTIVE | Noted: 2021-12-15

## 2022-04-05 PROBLEM — G89.29 CHRONIC ABDOMINAL PAIN: Status: ACTIVE | Noted: 2021-12-15

## 2022-04-05 PROBLEM — Z95.5 S/P CORONARY ARTERY STENT PLACEMENT: Chronic | Status: RESOLVED | Noted: 2018-02-02 | Resolved: 2022-04-05

## 2022-04-07 DIAGNOSIS — Z76.0 ENCOUNTER FOR MEDICATION REFILL: Primary | ICD-10-CM

## 2022-04-07 DIAGNOSIS — I10 PRIMARY HYPERTENSION: ICD-10-CM

## 2022-04-07 DIAGNOSIS — K59.01 SLOW TRANSIT CONSTIPATION: ICD-10-CM

## 2022-04-07 PROBLEM — G47.33 OSA (OBSTRUCTIVE SLEEP APNEA): Chronic | Status: ACTIVE | Noted: 2022-04-07

## 2022-04-07 LAB — SLPCOMP: NORMAL

## 2022-04-07 RX ORDER — METOPROLOL TARTRATE 25 MG/1
TABLET, FILM COATED ORAL
Qty: 180 TABLET | Refills: 3 | Status: SHIPPED | OUTPATIENT
Start: 2022-04-07 | End: 2022-08-25

## 2022-04-07 RX ORDER — ATORVASTATIN CALCIUM 80 MG/1
TABLET, FILM COATED ORAL
Qty: 90 TABLET | Refills: 3 | Status: SHIPPED | OUTPATIENT
Start: 2022-04-07 | End: 2023-03-03

## 2022-04-07 RX ORDER — POLYETHYLENE GLYCOL 3350 17 G/17G
POWDER ORAL
Qty: 510 G | Refills: 12 | Status: SHIPPED | OUTPATIENT
Start: 2022-04-07 | End: 2023-05-01

## 2022-04-07 NOTE — PROCEDURES
" SLEEP STUDY INTERPRETATION  DIAGNOSTIC POLYSOMNOGRAPHY REPORT      Patient: FRANCIA MATTSON  YOB: 1970  Study Date: 4/1/2022  MRN: [id number]  Referring Provider: Self  Ordering Provider: Janette Velazco PA-C    Indications for Polysomnography: The patient is a 52 year old Female who is 5' 4\" and weighs 131.2 lbs. Her BMI is 22.7, [place] sleepiness scale 0/24 and neck circumference is 35 cm. A diagnostic polysomnogram was performed to evaluate for  loud snoring, non-refreshing sleep, daytime fatigue (ESS 0),  crowded oropharynx and co-morbid HTN, CAD DM type 2 and CVA.    Polysomnogram Data: A full night polysomnogram recorded the standard physiologic parameters including EEG, EOG, EMG, ECG, nasal and oral airflow. Respiratory parameters of chest and abdominal movements were recorded with respiratory inductance plethysmography. Oxygen saturation was recorded by pulse oximetry. Hypopnea scoring rule used: 1B 4%.    Sleep Architecture: TV was reported to be on all night long. Sleep was fragmented and poorly consolidated  The total recording time of the polysomnogram was 531.7 minutes. The total sleep time was 258.5 minutes. Sleep latency was normal at 11.2 minutes without the use of a sleep aid. REM latency was 470.5 minutes. Arousal index was increased at 35.7 arousals per hour. Sleep efficiency was decreased at 48.6%. Wake after sleep onset was 261.5 minutes. The patient spent 17.0% of total sleep time in Stage N1, 45.8% in Stage N2, 25.9% in Stage N3, and 11.2% in REM. Time in REM supine was 0 minutes.    Respiration:     Events ? The polysomnogram revealed a presence of 0 obstructive, 1 central, and 0 mixed apneas resulting in an apnea index of 0.2 events per hour. There were 60 obstructive hypopneas and 0 central hypopneas resulting in an obstructive hypopnea index of 13.9 and central hypopnea index of 0 events per hour. The combined apnea/hypopnea index was 14.2 events per hour (central apnea/hypopnea " index was 0.2 events per hour). The REM AHI was 20.7 events per hour. The supine AHI was 13.0 events per hour. The RERA index was 3.7 events per hour.  The RDI was 17.9 events per hour.    Snoring - was reported as loud.    Respiratory rate and pattern - was notable for normal respiratory rate and pattern.    Sustained Sleep Associated Hypoventilation - Transcutaneous carbon dioxide monitoring was not used, however significant hypoventilation was not suggested by oximetry    Sleep Associated Hypoxemia - (Greater than 5 minutes O2 sat at or below 88%) was not present. Baseline oxygen saturation was 93.1%. Lowest oxygen saturation was 86.0%. Time spent less than or equal to 88% was 4.0 minutes. Time spent less than or equal to 89% was 11.5 minutes.    Movement Activity:     Periodic Limb Activity - There were 0 PLMs during the entire study.     REM EMG Activity - Excessive transient/sustained muscle activity was not present.    Nocturnal Behavior - Abnormal sleep related behaviors were not noted    Bruxism - None apparent.    Cardiac Summary:   The average pulse rate was 90.5 bpm. The minimum pulse rate was 72.0 bpm while the maximum pulse rate was 119.0 bpm.  Arrhythmias were not noted.      Assessment:     Mild obstructive sleep apnea      Recommendations:    Based on the presence of mild obstructive sleep apnea and symptoms or comorbidities, treatment could be empirically initiated with Auto?titrating PAP therapy with a range of 5 to 15 cmH2O. Recommend clinical follow up with sleep management team.    Patient may be a candidate for dental appliance through referral to Sleep Dentistry for the treatment of obstructive sleep apnea and/or socially disruptive snoring.    If devices are not acceptable or effective, patient may benefit from evaluation of possible surgical options. If she is interested, would recommend referral to specialized ENT-Sleep provider.    Advice regarding the risks of drowsy driving.    Suggest  optimizing sleep schedule and avoiding sleep deprivation.    Address sleep hygiene    Pharmacologic therapy should be used for management of restless legs syndrome only if present and clinically indicated and not based on the presence of periodic limb movements alone.    Diagnostic Codes:   Obstructive Sleep Apnea G47.33        _____________________________________   Electronically Signed By: Spencer Merida MD 4/7/22           Range(%) Time in range (min)   0.0 - 89.0 11.5   0.0 - 88.0 4.0         Stage Min(mm Hg) Max(mm Hg)   Wake - -   NREM(1+2+3) - -   REM - -       Range(mmHg) Time in range (min)   55.0 - 100.0 -   Excluded data <20.0 & >65.0 532.0

## 2022-04-12 ENCOUNTER — OFFICE VISIT (OUTPATIENT)
Dept: FAMILY MEDICINE | Facility: CLINIC | Age: 52
End: 2022-04-12
Payer: COMMERCIAL

## 2022-04-12 VITALS
DIASTOLIC BLOOD PRESSURE: 68 MMHG | OXYGEN SATURATION: 98 % | SYSTOLIC BLOOD PRESSURE: 112 MMHG | BODY MASS INDEX: 22.12 KG/M2 | HEIGHT: 64 IN | WEIGHT: 129.56 LBS | HEART RATE: 98 BPM | TEMPERATURE: 98.1 F

## 2022-04-12 DIAGNOSIS — E11.9 TYPE 2 DIABETES MELLITUS TREATED WITH INSULIN (H): Primary | ICD-10-CM

## 2022-04-12 DIAGNOSIS — G89.29 CHRONIC BILATERAL LOW BACK PAIN WITH BILATERAL SCIATICA: ICD-10-CM

## 2022-04-12 DIAGNOSIS — Z76.0 ENCOUNTER FOR MEDICATION REFILL: ICD-10-CM

## 2022-04-12 DIAGNOSIS — F33.9 RECURRENT MAJOR DEPRESSIVE DISORDER, REMISSION STATUS UNSPECIFIED (H): Chronic | ICD-10-CM

## 2022-04-12 DIAGNOSIS — M54.41 CHRONIC BILATERAL LOW BACK PAIN WITH BILATERAL SCIATICA: ICD-10-CM

## 2022-04-12 DIAGNOSIS — I10 ESSENTIAL HYPERTENSION: ICD-10-CM

## 2022-04-12 DIAGNOSIS — J44.9 CHRONIC OBSTRUCTIVE PULMONARY DISEASE, UNSPECIFIED COPD TYPE (H): ICD-10-CM

## 2022-04-12 DIAGNOSIS — Z79.4 TYPE 2 DIABETES MELLITUS TREATED WITH INSULIN (H): Primary | ICD-10-CM

## 2022-04-12 DIAGNOSIS — M54.42 CHRONIC BILATERAL LOW BACK PAIN WITH BILATERAL SCIATICA: ICD-10-CM

## 2022-04-12 DIAGNOSIS — Z91.09 ENVIRONMENTAL ALLERGIES: ICD-10-CM

## 2022-04-12 PROCEDURE — 99214 OFFICE O/P EST MOD 30 MIN: CPT | Performed by: FAMILY MEDICINE

## 2022-04-12 RX ORDER — HYDROCHLOROTHIAZIDE 12.5 MG/1
CAPSULE ORAL
Qty: 90 CAPSULE | Refills: 2 | Status: CANCELLED | OUTPATIENT
Start: 2022-04-12

## 2022-04-12 RX ORDER — METFORMIN HCL 500 MG
500 TABLET, EXTENDED RELEASE 24 HR ORAL
Qty: 90 TABLET | Refills: 1 | Status: CANCELLED | OUTPATIENT
Start: 2022-04-12

## 2022-04-12 RX ORDER — COLCHICINE 0.6 MG/1
TABLET ORAL
Status: CANCELLED | OUTPATIENT
Start: 2022-04-12

## 2022-04-12 RX ORDER — GABAPENTIN 300 MG/1
600 CAPSULE ORAL 3 TIMES DAILY
Qty: 180 CAPSULE | Refills: 3 | Status: SHIPPED | OUTPATIENT
Start: 2022-04-12 | End: 2022-12-07

## 2022-04-12 RX ORDER — AMITRIPTYLINE HYDROCHLORIDE 10 MG/1
20 TABLET ORAL AT BEDTIME
Status: CANCELLED | OUTPATIENT
Start: 2022-04-12

## 2022-04-12 NOTE — PROGRESS NOTES
ASSESMENT AND PLAN:  Type 2 diabetes mellitus treated with insulin (H)  She is taking Metformin 500 mg once a day. (Prescription was written for XR but the bottle states Metformin 500 mg)  We will increase Metformin since her creatinine has been normal in the past several months.  She will continue Lantus 20 units p.m. and 10 units a.m.  Follow-up in 1 month.  Will adjust insulin if needed.  - metFORMIN (GLUCOPHAGE) 1000 MG tablet; Take 1 tablet (1,000 mg) by mouth 2 times daily (with meals)    Essential hypertension  Controlled.    Chronic bilateral low back pain with bilateral sciatica  - gabapentin (NEURONTIN) 300 MG capsule; Take 2 capsules (600 mg) by mouth 3 times daily    Recurrent major depressive disorder, remission status unspecified (H)  Stable.    Chronic obstructive pulmonary disease, unspecified COPD type (H)  Follow-up with pulmonology as scheduled.    Encounter for medication refill  She requesting colchicine refill.  This medication is managed by cardiology.  Will clarify with cardiology first if patient needs to be on this medication long-term.  Message sent to staff, will call cardiology.    Environmental allergies  Managed by allergy.    This transcription uses voice recognition software, which may contain typographical errors.      SUBJECTIVE: Kulwant Amin is a 52-year-old female with multiple chronic medical conditions including but not limited to uncontrolled diabetes, depression, hypertension, coronary artery disease, s/p stent placement, allergy COPD and others here to follow-up on diabetes.  She is currently using Lantus 20 units p.m. and 10 units a.m.  She did not bring her blood sugar log but daughter-in-law states her fasting blood sugar in the morning is usually in the 200 range and 2 hours after dinner runs in the 400s to 500s.  No new physical complaints today.  She is taking Metformin 500 mg once a day.  Her creatinines was normal 2 weeks ago.  She sees cardiology, pulmonology,  sleep medicine and allergy.  She is on colchicine managed by cardiology, requesting refill.      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     Microcytic anemia 11/17/2019     S/P coronary artery stent placement 02/02/2018     TB lung, latent     9 mos INH     Patient Active Problem List   Diagnosis     Pulmonary nodule, right     Environmental allergies     TB lung, latent     COPD (chronic obstructive pulmonary disease)/Asthma      HTN (hypertension)     Hyperthyroidism     Cataracts, bilateral     Corneal opacity     Irregular astigmatism     Anxiety     Chronic nonintractable headache, unspecified headache type     Coronary artery disease due to lipid rich plaque     Dizziness     Hyperlipidemia     Moderate major depression (H)     Moderate persistent asthma     Type 2 diabetes mellitus treated without insulin (H)     Unspecified visual loss     GERD (gastroesophageal reflux disease)     Dental caries     H/O arterial ischemic stroke     Constipation     Dysphagia     Chronic abdominal pain     Insomnia     FLACO (obstructive sleep apnea)- mild (AHI 14)       Allergies:  No Known Allergies    History   Smoking Status     Former Smoker     Packs/day: 1.00     Years: 30.00     Types: Cigarettes, Cigarettes, Cigarettes     Quit date: 1/1/2003   Smokeless Tobacco     Never Used     Comment: No passive exposure       Review of systems otherwise negative except as listed in HPI.   History  "  Smoking Status     Former Smoker     Packs/day: 1.00     Years: 30.00     Types: Cigarettes, Cigarettes, Cigarettes     Quit date: 1/1/2003   Smokeless Tobacco     Never Used     Comment: No passive exposure       OBJECTICE: /68   Pulse 98   Temp 98.1  F (36.7  C) (Oral)   Ht 1.626 m (5' 4\")   Wt 58.8 kg (129 lb 9 oz)   SpO2 98%   BMI 22.24 kg/m        GEN-alert,  in no apparent distress.  HEENT- neck is supple.  CV-regular rate and rhythm with no murmur.   RESP-lungs -coarse breath sounds, no crackles or rhonchi.  ABDOMEN- Soft , not tender.  EXTREM- No open lesions or ulcers.  Sensation intact.  SKIN-normal        Adrien Cardoza MD   4/12/2022   "

## 2022-04-12 NOTE — TELEPHONE ENCOUNTER
Dr. Cardoza is inquiring if patient's cardiology team is still managing this medication (Colchicine) for patient and if she should be on it long term or not.    If patient should still be on the medication long term, can the cardiologist send in the refill for patient? Patient has been without her medication (Colchicine) for a week now.    Thank you,    Roxanna ALCANTARA RN

## 2022-04-13 NOTE — TELEPHONE ENCOUNTER
line is currently experiencing system outage and unable to process call. Will attempt at a later time.  -------------------  Message received 4/12/2022 @ 4:44  Ja Kramer MD Fleischhacker, Kelly, RN  Caller: Unspecified (4 weeks ago)  This medication ( colchicine)does not need to be continued. Please remeove from med list and inform patient.cmc             Previous Messages       ----- Message -----   From: Roxanna Sultana RN   Sent: 4/12/2022   3:29 PM CDT   To: Ja Kramer MD

## 2022-04-14 ENCOUNTER — OFFICE VISIT (OUTPATIENT)
Dept: PULMONOLOGY | Facility: OTHER | Age: 52
End: 2022-04-14
Payer: COMMERCIAL

## 2022-04-14 ENCOUNTER — TELEPHONE (OUTPATIENT)
Dept: CARDIOLOGY | Facility: CLINIC | Age: 52
End: 2022-04-14

## 2022-04-14 VITALS
BODY MASS INDEX: 21.97 KG/M2 | HEART RATE: 96 BPM | WEIGHT: 128 LBS | SYSTOLIC BLOOD PRESSURE: 110 MMHG | DIASTOLIC BLOOD PRESSURE: 60 MMHG | RESPIRATION RATE: 16 BRPM | OXYGEN SATURATION: 98 %

## 2022-04-14 DIAGNOSIS — J45.50 SEVERE PERSISTENT ASTHMA WITHOUT COMPLICATION (H): ICD-10-CM

## 2022-04-14 DIAGNOSIS — R06.2 WHEEZING: Primary | ICD-10-CM

## 2022-04-14 PROCEDURE — 99214 OFFICE O/P EST MOD 30 MIN: CPT | Performed by: INTERNAL MEDICINE

## 2022-04-14 ASSESSMENT — ASTHMA QUESTIONNAIRES
ACT_TOTALSCORE: 12
QUESTION_4 LAST FOUR WEEKS HOW OFTEN HAVE YOU USED YOUR RESCUE INHALER OR NEBULIZER MEDICATION (SUCH AS ALBUTEROL): ONE OR TWO TIMES PER DAY
ACT_TOTALSCORE: 12
QUESTION_5 LAST FOUR WEEKS HOW WOULD YOU RATE YOUR ASTHMA CONTROL: SOMEWHAT CONTROLLED
QUESTION_1 LAST FOUR WEEKS HOW MUCH OF THE TIME DID YOUR ASTHMA KEEP YOU FROM GETTING AS MUCH DONE AT WORK, SCHOOL OR AT HOME: A LITTLE OF THE TIME
QUESTION_3 LAST FOUR WEEKS HOW OFTEN DID YOUR ASTHMA SYMPTOMS (WHEEZING, COUGHING, SHORTNESS OF BREATH, CHEST TIGHTNESS OR PAIN) WAKE YOU UP AT NIGHT OR EARLIER THAN USUAL IN THE MORNING: FOUR OR MORE NIGHTS A WEEK
QUESTION_2 LAST FOUR WEEKS HOW OFTEN HAVE YOU HAD SHORTNESS OF BREATH: ONCE A DAY

## 2022-04-14 NOTE — TELEPHONE ENCOUNTER
[facility] Call Center    Phone Message    May a detailed message be left on voicemail: yes     Reason for Call: Medication Refill Request    Has the patient contacted the pharmacy for the refill? Yes    Name of medication being requested: Colchicine 0.6 MG - not on current med list  Provider who prescribed the medication: Dr. Hirsch  Pharmacy: [facility] - [place], [place] - [address]  Date medication is needed: 4/14/2022         Action Taken: Message routed to:  Other: Cardiology    Travel Screening: Not Applicable

## 2022-04-14 NOTE — PROGRESS NOTES
Assessment/Plan:    50 y.o.-year-old woman with history of organic fuel exposure in the home was previously had nondiagnostic PFTs.  She was previously evaluated by Dr. Marie and initiated on Dupixent for her hypereosinophilia with some response to this. Her hypereosinophilia evaluation has been unremarkable.  For the present we would recommend;    Continue Breo Ellipta 1 puff daily.  She knows to gargle after using this.    Continue Spiriva.    Continue to use 3 times daily albuterol nebs.    Continue Protonix 40 mg daily.    Continue Dupixent injections (increased dose).    As needed albuterol for rescue.    Has received both doses of her Covid-19 vaccine.    Return to clinic in 6 weeks.    I will send a note to Dr. Kramer about whether or not the patient should resume her Colchicine as she feels more chest discomfort off this.    Tamra ALCANTARA [facility]  Pulmonary and Critical Care  [id number]          Subjective:    Patient ID: Ms. Amin is a 49 y.o.female with a past medical history significant for anxiety, arthritis, questionable asthma versus COPD, diabetes, hypertension and a prior history of SVT presents with a follow-up visit for her pulmonary complaints.  She follows with me fairly closely for her moderate-persistent, difficult to control asthma symptoms.   She continues to be short of breath although admittedly is better than the last clinic visit.  She was seen by Dr. Marie in the interim and given an appropriate decline in her eosinophil count, she decided to increase the Dupixent dose. The daughter-in-law and the patient both feel that her shortness of breath has improved since being on a higher dose of Dupixent and that her time to having severe shortness of breath has been stretched out to just under 2 weeks.  She does complain of some dizziness today, but denies wheezing, endorses chest tightness and has no PND or orthopnea.  ACT Total Scores 2/7/2022 3/17/2022 4/14/2022   ACT TOTAL SCORE - - -   ASTHMA ER VISITS -  - -   ASTHMA HOSPITALIZATIONS - - -   ACT TOTAL SCORE (Goal Greater than or Equal to 20) 7 11 12   In the past 12 months, how many times did you visit the emergency room for your asthma without being admitted to the hospital? 1 0 0   In the past 12 months, how many times were you hospitalized overnight because of your asthma? 0 0 0         Pertinent past medical history:  48-year-old female here for follow-up.  Her breathing is a bit worse than 6 months ago.  She had multiple ER visits and evaluations.  This includes negative PE study.  She had a cath with a 75% LAD lesion which was intervened upon.  Since her catheterization she feels she has more heartburn.  Her breathing is worse with exertion.  Improves with rest.  It is also worse with cold weather.  Warm weather and humid air does not bother.  Symptoms localized to the chest.  Pertinent positives include burning sensation in the chest.  Pertinent negatives include no fever or hemoptysis.    Current Outpatient Medications   Medication Sig Dispense Refill     acetaminophen (TYLENOL) 500 MG tablet Take 2 tablets (1,000 mg) by mouth every 8 hours 100 tablet 0     albuterol (PROAIR HFA/PROVENTIL HFA/VENTOLIN HFA) 108 (90 Base) MCG/ACT inhaler Inhale 2 puffs into the lungs every 4 hours as needed for shortness of breath / dyspnea 4 g 11     albuterol (PROVENTIL) (2.5 MG/3ML) 0.083% neb solution Inhale 2.5 mg into the lungs every 6 hours as needed        ALLERGY RELIEF 10 MG tablet Take 10 mg by mouth daily       amitriptyline (ELAVIL) 10 MG tablet Take 2 tablets by mouth At Bedtime        ASPIRIN LOW DOSE 81 MG EC tablet TAKE 1 TABLET (81 MG TOTAL) BY MOUTH DAILY. 90 tablet 3     atorvastatin (LIPITOR) 80 MG tablet TAKE 1 TABLET (80 MG TOTAL) BY MOUTH AT BEDTIME FOR CHOLESTEROL 90 tablet 3     B-D U/F 31G X 8 MM insulin pen needle USE ONE PEN NEEDLE DAILY AS NEEDED FOR  each 1     Continuous Blood Gluc  (FREESTYLE MONICA 14 DAY READER) MICHAEL Uses daily        Continuous Blood Gluc Sensor (FREESTYLE MONICA 14 DAY SENSOR) MISC USE 1 UNITS AS DIRECTED EVERY 14 (FOURTEEN) DAYS. 2 each 3     CONTOUR NEXT TEST test strip Pt checking blood sugar 2x per day       cyclobenzaprine (FLEXERIL) 5 MG tablet TAKE 1-2 TABLETS (5-10 MG) BY MOUTH 3 TIMES DAILY AS NEEDED FOR MUSCLE SPASMS 30 tablet 3     diclofenac (VOLTAREN) 1 % topical gel Apply 2 g topically 4 times daily 50 g 1     Dupilumab (DUPIXENT) 300 MG/2ML SOPN Inject 300 mg Subcutaneous every 14 days 2 mL 3     DUPIXENT 200 MG/1.14ML SOSY INJECT THE CONTENTS OF 1 SYRINGE (200 MG) UNDER THE SKIN EVERY 14 DAYS 2.28 mL 1     famotidine (PEPCID) 20 MG tablet Take 1 tablet (20 mg) by mouth 2 times daily 90 tablet 3     fluticasone-vilanterol (BREO ELLIPTA) 200-25 MCG/INH inhaler Inhale 1 puff into the lungs daily 60 each 11     gabapentin (NEURONTIN) 300 MG capsule Take 2 capsules (600 mg) by mouth 3 times daily 180 capsule 3     Global Inject Ease Lancets 30G MISC Use twice daily       guaiFENesin (ROBITUSSIN) 100 MG/5ML liquid Take 10 mLs (200 mg) by mouth every 4 hours as needed for cough 200 mL 1     hydrochlorothiazide (MICROZIDE) 12.5 MG capsule TAKE 1 CAPSULE (12.5 MG TOTAL) BY MOUTH DAILY FOR BLOOD PRESSURE 90 capsule 2     hydrocortisone (CORTAID) 1 % external cream Apply topically 2 times daily 60 g 0     Insulin Aspart FlexPen 100 UNIT/ML SOPN GIVE BEFORE MEALS: FOR PRE-MEAL GLUCOSE: 140-189 GIVE 1 UNIT, 190-239 GIVE 2 UNITS, 240-289 GIVE 3 UNITS, 290-339 GIVE 4 UNITS, =OR>340 GIVE 6 UNITS *84* 15 mL 0     insulin glargine (LANTUS PEN) 100 UNIT/ML pen Inject 10 Units Subcutaneous every morning AND 24 Units At Bedtime. 15 mL 3     insulin pen needle (32G X 4 MM) 32G X 4 MM miscellaneous Use one pen needles daily or as directed. 200 each 11     ipratropium - albuterol 0.5 mg/2.5 mg/3 mL (DUONEB) 0.5-2.5 (3) MG/3ML neb solution Take 1 vial (3 mLs) by nebulization every 6 hours as needed for shortness of breath / dyspnea  or wheezing 240 mL 11     meclizine (ANTIVERT) 25 MG tablet Take 25 mg by mouth daily as needed        metFORMIN (GLUCOPHAGE) 1000 MG tablet Take 1 tablet (1,000 mg) by mouth 2 times daily (with meals) 180 tablet 1     metFORMIN (GLUCOPHAGE-XR) 500 MG 24 hr tablet Take 1 tablet (500 mg) by mouth daily (with dinner) 90 tablet 1     metoprolol tartrate (LOPRESSOR) 25 MG tablet TAKE 1 TABLET (25 MG TOTAL) BY MOUTH 2 (TWO) TIMES A DAY FOR BLOOD PRESSURE 180 tablet 3     montelukast (SINGULAIR) 10 MG tablet Take 1 tablet (10 mg) by mouth At Bedtime 30 tablet 6     omeprazole (PRILOSEC) 40 MG DR capsule Take 1 capsule (40 mg) by mouth daily 90 capsule 3     ondansetron (ZOFRAN) 4 MG tablet Take 1 tablet (4 mg) by mouth every 8 hours as needed for nausea 30 tablet 1     Polyethylene Glycol 3350 (PEG 3350) 17 GM/SCOOP POWD MIX 17 GRAMS WITH LIQUID AND DRINK ONCE DAILY FOR CONSTIPATION 510 g 12     polyvinyl alcohol (ARTIFICIAL TEARS) 1.4 % ophthalmic solution Place 1 drop into both eyes as needed for dry eyes 15 mL 3     predniSONE (DELTASONE) 2.5 MG tablet Take 2.5 mg by mouth daily        predniSONE (DELTASONE) 20 MG tablet Take 2 tabs daily x 5 days 10 tablet 0     sucralfate (CARAFATE) 1 GM tablet TAKE 1 TABLET (1 G TOTAL) BY MOUTH 4 (FOUR) TIMES A DAY BEFORE MEALS AND AT BEDTIME. TAKE 1 HOUR PRIOR TO MEALS 120 tablet 11     tiotropium (SPIRIVA RESPIMAT) 2.5 MCG/ACT inhalation aerosol Inhale 2 puffs into the lungs daily 4 g 1     furosemide (LASIX) 20 MG tablet Take 1 tablet (20 mg) by mouth daily for 10 days 10 tablet 1     Social history:  Lives in a house built in 1963; no concern for mold in the house.  7 People live in the house including 2 children.  There are no pets in the house.  She is a never smoker and there is no second hand exposure to smoke.  She does not drink alcoholic beverages and denies indulging in recreational drugs.    Physical Exam   /60 (BP Location: Right arm, Patient Position: Chair,  Cuff Size: Adult Regular)   Pulse 96   Resp 16   Wt 58.1 kg (128 lb)   SpO2 98%   BMI 21.97 kg/m    Physical Exam  Constitutional:       Appearance: Normal appearance.   HENT:      Head: Normocephalic.      Mouth/Throat:      Mouth: Mucous membranes are moist.   Cardiovascular:      Rate and Rhythm: Normal rate and regular rhythm.      Heart sounds: Normal heart sounds.   Pulmonary:      Effort: Pulmonary effort is normal.      Breath sounds: Normal breath sounds. No wheezing.   Abdominal:      General: Abdomen is flat.   Skin:     General: Skin is warm.   Neurological:      General: No focal deficit present.      Mental Status: She is alert.        Latest Reference Range & Units 02/07/22 15:47   Neutrophil Cytoplasmic Antibody <1:10  <1:10   Neutrophil Cytoplasmic Antibody Pattern  The ANCA IFA is <1:10.  No further testing will be performed.      Latest Reference Range & Units 02/07/22 15:47   Schistosoma Ab IgG Negative  Negative [1]   Strongyloides Bere IgG <=0.9 IV 0.4 [2]      Latest Reference Range & Units 02/07/22 15:47   Immunoglobulin E <=214 kU/L 74 [1]   Allergen Fungimold A Fumigatus IgE <=0.34 kU/L <0.10 [2]   Aspergillus Fumagatis 1 Antibody None Detected  None Detected [3]   Aspergillus Fumagatis 6 Antibody None Detected  None Detected [4]   Allergen, Interp, Immunocap Score IgE  See Note [5]         Tamra Duong MD  Pulmonary and Critical Care  (P) [phone]

## 2022-04-14 NOTE — TELEPHONE ENCOUNTER
Called patient via [facility]  and informed her she no longer needs colchicine. Patient verbalized understanding and has no further questions at this time. Called [facility] to confirm , as well.  -----------------  Ja Kramer MD Fleischhacker, Kelly, RN  Caller: Unspecified (1 month ago)  This medication ( colchicine)does not need to be continued. Please remeove from med list and inform patient.[facility]

## 2022-04-15 ENCOUNTER — VIRTUAL VISIT (OUTPATIENT)
Dept: SLEEP MEDICINE | Facility: CLINIC | Age: 52
End: 2022-04-15
Payer: COMMERCIAL

## 2022-04-15 VITALS — HEIGHT: 64 IN | BODY MASS INDEX: 21.85 KG/M2 | WEIGHT: 128 LBS

## 2022-04-15 DIAGNOSIS — G47.33 OSA (OBSTRUCTIVE SLEEP APNEA): Primary | ICD-10-CM

## 2022-04-15 PROCEDURE — 99213 OFFICE O/P EST LOW 20 MIN: CPT | Mod: TEL | Performed by: PHYSICIAN ASSISTANT

## 2022-04-15 ASSESSMENT — SLEEP AND FATIGUE QUESTIONNAIRES
HOW LIKELY ARE YOU TO NOD OFF OR FALL ASLEEP WHILE WATCHING TV: WOULD NEVER DOZE
HOW LIKELY ARE YOU TO NOD OFF OR FALL ASLEEP WHILE LYING DOWN TO REST IN THE AFTERNOON WHEN CIRCUMSTANCES PERMIT: WOULD NEVER DOZE
HOW LIKELY ARE YOU TO NOD OFF OR FALL ASLEEP WHILE SITTING INACTIVE IN A PUBLIC PLACE: WOULD NEVER DOZE
HOW LIKELY ARE YOU TO NOD OFF OR FALL ASLEEP IN A CAR, WHILE STOPPED FOR A FEW MINUTES IN TRAFFIC: WOULD NEVER DOZE
HOW LIKELY ARE YOU TO NOD OFF OR FALL ASLEEP WHILE SITTING QUIETLY AFTER LUNCH WITHOUT ALCOHOL: WOULD NEVER DOZE
HOW LIKELY ARE YOU TO NOD OFF OR FALL ASLEEP WHILE SITTING AND READING: WOULD NEVER DOZE
HOW LIKELY ARE YOU TO NOD OFF OR FALL ASLEEP WHILE SITTING AND TALKING TO SOMEONE: WOULD NEVER DOZE
HOW LIKELY ARE YOU TO NOD OFF OR FALL ASLEEP WHEN YOU ARE A PASSENGER IN A CAR FOR AN HOUR WITHOUT A BREAK: WOULD NEVER DOZE

## 2022-04-15 NOTE — PROGRESS NOTES
"Kulwant Amin is a 52 year old female being evaluated via a billable telephone visit.     \"This telephone visit will be conducted via a call between you and your physician/provider. We have found that certain health care needs can be provided without the need for an in-person visit or physical exam.  This service lets us provide the care you need with a telephone conversation.  If a prescription is necessary we can send it directly to your pharmacy.  If lab work is needed we can place an order for that and you can then stop by our lab to have the test done at a later time.\"    Telephone visits are billed at different rates depending on your insurance coverage.  Please reach out to your insurance provider with any questions.    Patient has given verbal consent for  a Telephone visit? No    What telephone number would you like your provider to contact at at:  [phone] (home)     How would you like to obtain your AVS? Reta Yanez, Virtual Facilitator     Telephone Visit Details:     Telephone Visit Start Time: 10:35 AM    Telephone Visit End Time:10:47 AM    "

## 2022-04-15 NOTE — PROGRESS NOTES
Sleep Study Follow-Up Visit:    Date on this visit: 4/15/2022    Kulwant Amin comes in today for follow-up of her sleep study done on 4/1/2022 at the [facility].    A diagnostic polysomnogram was performed to evaluate for  loud snoring, non-refreshing sleep, daytime fatigue (ESS 0), crowded oropharynx and co-morbid HTN, CAD DM type 2 and CVA.     Polysomnogram Data: A full night polysomnogram recorded the standard physiologic parameters including EEG, EOG, EMG, ECG, nasal and oral airflow. Respiratory parameters of chest and abdominal movements were recorded with respiratory inductance plethysmography. Oxygen saturation was recorded by pulse oximetry. Hypopnea scoring rule used: 1B 4%.     Sleep Architecture: TV was reported to be on all night long. Sleep was fragmented and poorly consolidated  The total recording time of the polysomnogram was 531.7 minutes. The total sleep time was 258.5 minutes. Sleep latency was normal at 11.2 minutes without the use of a sleep aid. REM latency was 470.5 minutes. Arousal index was increased at 35.7 arousals per hour. Sleep efficiency was decreased at 48.6%. Wake after sleep onset was 261.5 minutes. The patient spent 17.0% of total sleep time in Stage N1, 45.8% in Stage N2, 25.9% in Stage N3, and 11.2% in REM. Time in REM supine was 0 minutes.     Respiration:     Events ? The polysomnogram revealed a presence of 0 obstructive, 1 central, and 0 mixed apneas resulting in an apnea index of 0.2 events per hour. There were 60 obstructive hypopneas and 0 central hypopneas resulting in an obstructive hypopnea index of 13.9 and central hypopnea index of 0 events per hour. The combined apnea/hypopnea index was 14.2 events per hour (central apnea/hypopnea index was 0.2 events per hour). The REM AHI was 20.7 events per hour. The supine AHI was 13.0 events per hour. The RERA index was 3.7 events per hour.  The RDI was 17.9 events per hour.    Snoring - was  "reported as loud.    Respiratory rate and pattern - was notable for normal respiratory rate and pattern.    Sustained Sleep Associated Hypoventilation - Transcutaneous carbon dioxide monitoring was not used, however significant hypoventilation was not suggested by oximetry    Sleep Associated Hypoxemia - (Greater than 5 minutes O2 sat at or below 88%) was not present. Baseline oxygen saturation was 93.1%. Lowest oxygen saturation was 86.0%. Time spent less than or equal to 88% was 4.0 minutes. Time spent less than or equal to 89% was 11.5 minutes.     Movement Activity:     Periodic Limb Activity - There were 0 PLMs during the entire study.     REM EMG Activity - Excessive transient/sustained muscle activity was not present.    Nocturnal Behavior - Abnormal sleep related behaviors were not noted    Bruxism - None apparent.     Cardiac Summary:   The average pulse rate was 90.5 bpm. The minimum pulse rate was 72.0 bpm while the maximum pulse rate was 119.0 bpm.  Arrhythmias were not noted.      These findings were reviewed with patient.     Past medical/surgical history, family history, social history, medications and allergies were reviewed.      Problem List:  Patient Active Problem List    Diagnosis Date Noted     FLACO (obstructive sleep apnea)- mild (AHI 14) 04/07/2022     Priority: Medium     4/1/2022 [facility] (131.2 lbs) - AHI 14.2, RDI 17.9, Supine AHI 13.0, REM AHI 20.7, Low O2 86.0%, Time Spent ?88% 4.0 minutes / Time Spent ?89% 11.5 minutes.       Insomnia 01/26/2022     Priority: Medium     Constipation 12/15/2021     Priority: Medium     Dysphagia 12/15/2021     Priority: Medium     Chronic abdominal pain 12/15/2021     Priority: Medium     H/O arterial ischemic stroke 09/10/2021     Priority: Medium       Right pontine- Jan 2021       Anxiety 02/08/2021     Priority: Medium     Dizziness 02/08/2021     Priority: Medium     Like \"like whole body spinning\", + Nausea       Type 2 " diabetes mellitus treated without insulin (H) 02/08/2021     Priority: Medium     Moderate major depression (H) 12/11/2020     Priority: Medium     Chronic nonintractable headache, unspecified headache type 06/24/2020     Priority: Medium     Hyperlipidemia 11/07/2018     Priority: Medium     Moderate persistent asthma 03/08/2018     Priority: Medium     Coronary artery disease due to lipid rich plaque 01/25/2018     Priority: Medium     1/2018 she had stent placement to proximal to mid LAD.  Multiple stress test since that time including stress MRI have not shown evidence of inducible ischemia.        GERD (gastroesophageal reflux disease) 12/15/2016     Priority: Medium     Cataracts, bilateral 06/03/2015     Priority: Medium     Corneal opacity 06/03/2015     Priority: Medium     Problem list name updated by automated process. Provider to review       Irregular astigmatism 06/03/2015     Priority: Medium     Hyperthyroidism 02/05/2015     Priority: Medium     HTN (hypertension) 09/02/2014     Priority: Medium     TB lung, latent 08/17/2013     Priority: Medium     INH for 9 months       COPD (chronic obstructive pulmonary disease)/Asthma  08/17/2013     Priority: Medium     Unclear dx; being f/u by pulm    PFT Latest Ref Rng & Units 1/23/2015   FVC L 2.40   FEV1 L 1.81   FVC% % 81   FEV1% % 75          Pulmonary nodule, right 06/12/2013     Priority: Medium     4 mm in size.       Environmental allergies 06/12/2013     Priority: Medium     Allergic to dust mite and cockroach.        Unspecified visual loss 04/17/2012     Priority: Medium     Dental caries 04/17/2012     Priority: Medium        Impression/Plan:  1. Mild obstructive sleep apnea-   Polysomnogram was reviewed in detail today with the patient and her son.  Counseled the patient that mild sleep apnea is not felt to significantly increase long-term cardiovascular risk, but can contribute to excessive daytime sleepiness.    Treatment options discussed  today including auto-CPAP, oral appliance therapy or surgical options  Elected treatment of sleep apnea with auto-CPAP 6-15 cm/H20    She will follow up with me in about 2 month(s).     25 minutes spent on day of encounter doing chart review, counseling, coordinating plan of care, documentation and further activities as noted above.      Janette Velazco PA-C

## 2022-04-15 NOTE — NURSING NOTE
She will follow up in about 2 month(s). For cpap compliance Mychart reminder sent.    Chung Darling, Visit facilitator

## 2022-04-18 ENCOUNTER — TELEPHONE (OUTPATIENT)
Dept: PULMONOLOGY | Facility: OTHER | Age: 52
End: 2022-04-18
Payer: COMMERCIAL

## 2022-04-18 DIAGNOSIS — J45.901 ASTHMA EXACERBATION: ICD-10-CM

## 2022-04-18 RX ORDER — PREDNISONE 20 MG/1
TABLET ORAL
Qty: 10 TABLET | Refills: 0 | Status: SHIPPED | OUTPATIENT
Start: 2022-04-18 | End: 2022-05-04

## 2022-04-18 NOTE — TELEPHONE ENCOUNTER
Phone call from patient's daughter-in-law, Billy.  States that Kulwant has increased cough with some wheezing.  No other symptoms.     Will send Rx for her action plan:  Prednisone 40mg daily x 5 days.    Encouraged to call back if this is not helpful.

## 2022-04-25 ENCOUNTER — VIRTUAL VISIT (OUTPATIENT)
Dept: OTOLARYNGOLOGY | Facility: CLINIC | Age: 52
End: 2022-04-25
Payer: COMMERCIAL

## 2022-04-25 ENCOUNTER — OFFICE VISIT (OUTPATIENT)
Dept: OTOLARYNGOLOGY | Facility: CLINIC | Age: 52
End: 2022-04-25
Payer: COMMERCIAL

## 2022-04-25 ENCOUNTER — PRE VISIT (OUTPATIENT)
Dept: OTOLARYNGOLOGY | Facility: CLINIC | Age: 52
End: 2022-04-25
Payer: COMMERCIAL

## 2022-04-25 VITALS — WEIGHT: 128 LBS | HEIGHT: 64 IN | BODY MASS INDEX: 21.85 KG/M2

## 2022-04-25 DIAGNOSIS — R49.0 DYSPHONIA: Primary | ICD-10-CM

## 2022-04-25 DIAGNOSIS — R13.19 OTHER DYSPHAGIA: ICD-10-CM

## 2022-04-25 DIAGNOSIS — J38.3 PARADOXICAL VOCAL FOLD MOTION DISORDER: ICD-10-CM

## 2022-04-25 DIAGNOSIS — J45.40 MODERATE PERSISTENT ASTHMA, UNSPECIFIED WHETHER COMPLICATED: ICD-10-CM

## 2022-04-25 PROCEDURE — 92507 TX SP LANG VOICE COMM INDIV: CPT | Mod: GN | Performed by: SPEECH-LANGUAGE PATHOLOGIST

## 2022-04-25 PROCEDURE — 92524 BEHAVRAL QUALIT ANALYS VOICE: CPT | Mod: GN | Performed by: SPEECH-LANGUAGE PATHOLOGIST

## 2022-04-25 PROCEDURE — 31579 LARYNGOSCOPY TELESCOPIC: CPT | Performed by: OTOLARYNGOLOGY

## 2022-04-25 PROCEDURE — 99204 OFFICE O/P NEW MOD 45 MIN: CPT | Mod: 25 | Performed by: OTOLARYNGOLOGY

## 2022-04-25 ASSESSMENT — PAIN SCALES - GENERAL: PAINLEVEL: MILD PAIN (3)

## 2022-04-25 NOTE — PATIENT INSTRUCTIONS
1.  You were seen in the ENT Clinic today by Dr. Brice.     2. Your next steps:   - Speech therapy    3.  Plan is to return to clinic as needed if symptoms persist or worsen despite the steps above.    If you have any questions or concerns after your appointment, please call the clinic:   - Clinic phone: [phone].   - Cande (Dr. Brice's nurse): [phone]    Cande Johnson RN, BSN  [facility]  Department of Otolaryngology  [facility]  [email or web address]

## 2022-04-25 NOTE — LETTER
"4/25/2022      RE: Kulwant Amin  [address] [facility] [address]       Dear Dr. Duong:    I had the pleasure of meeting Kulwant Amin in consultation at the [facility] of the [facility] at your request, for evaluation of breathing problems and chronic cough. The note from our visit follows. Speech recognition software may have been used in the documentation below; input is reviewed before signature to the best of my ability. I appreciate the opportunity to participate in the care of this pleasant patient.    Please feel free to contact me with any questions.    Sincerely yours,      Elizabeth Brice M.D., M.P.H.  , Laryngology  Director, [facility]  Otolaryngology- Head & Neck Surgery  [phone]          =====    HISTORY OF PRESENT ILLNESS:   Kulwant Amin is a pleasant 52-year-old Thai-speaking female with a past medical history including organic fuel exposure, hypereosinophilia, nondiagnostic PFTs, anxiety, arthritis, questionable asthma versus COPD, diabetes, hypertension and a prior history of SVT, who presents with a greater than 10 year history of throat concerns.       Voice  Sometimes, her voice \"gets stuck\" for a few minutes at a time and can then recover.      Swallowing  No concerns. Sometimes, she has trouble with solids, especially when having coughing.  Does not feel like things get down the wrong way in general.    Cough/Throat-clearing  She has frequent dry cough on a daily basis since 2012, with no definite cause. Coughing can lead to episodes of breathing difficulty and wheezing. At times, she has a severe cough (about once every 4-6 months) when she develops significant breathing difficulty, and feels afraid she is not going to survive. The coughing fits last 5-10 minutes. Triggers include cold air, strong smells.   Prednisone tapers and albuterol can help. It tends to be a dry cough, and " sometimes has a little bit of mucus.    Breathing  She has had breathing problems since 2012, with a relatively sudden onset, and no definite cause. Her asthma has been difficult to control. She has been treated with a variety of medications including Breo, Spiriva, pantoprazole, Dupixent injections, and albuterol. Dupixent seems to help.    PE study was negative.  She had prior cardiac catheterization.    Her breathing is worse with exertion, and worse with cold weather. She has a burning sensation in the chest. He reports she does not have noisy breathing usually, but sometimes has wheezing. It seems more difficult to breathe in. Sometimes, she gets inspiratory stridor.      Throat discomfort  No concerns.      Reflux-type symptoms  She experiences heartburn/indigestion rarely. She is taking reflux medications.      Prior Epic/outside records were reviewed for this visit, including:  Dr. Marie 3/17/22  Dr. Duong 4/14/22      MEDICATIONS:     Current Outpatient Medications   Medication Sig Dispense Refill     acetaminophen (TYLENOL) 500 MG tablet Take 2 tablets (1,000 mg) by mouth every 8 hours 100 tablet 0     albuterol (PROAIR HFA/PROVENTIL HFA/VENTOLIN HFA) 108 (90 Base) MCG/ACT inhaler Inhale 2 puffs into the lungs every 4 hours as needed for shortness of breath / dyspnea 4 g 11     albuterol (PROVENTIL) (2.5 MG/3ML) 0.083% neb solution Inhale 2.5 mg into the lungs every 6 hours as needed        ALLERGY RELIEF 10 MG tablet Take 10 mg by mouth daily       amitriptyline (ELAVIL) 10 MG tablet Take 2 tablets by mouth At Bedtime        ASPIRIN LOW DOSE 81 MG EC tablet TAKE 1 TABLET (81 MG TOTAL) BY MOUTH DAILY. 90 tablet 3     atorvastatin (LIPITOR) 80 MG tablet TAKE 1 TABLET (80 MG TOTAL) BY MOUTH AT BEDTIME FOR CHOLESTEROL 90 tablet 3     B-D U/F 31G X 8 MM insulin pen needle USE ONE PEN NEEDLE DAILY AS NEEDED FOR  each 1     Continuous Blood Gluc  (FREESTYLE MONICA 14 DAY READER) MICHAEL Uses daily        Continuous Blood Gluc Sensor (FREESTYLE MONICA 14 DAY SENSOR) MISC USE 1 UNITS AS DIRECTED EVERY 14 (FOURTEEN) DAYS. 2 each 3     CONTOUR NEXT TEST test strip Pt checking blood sugar 2x per day       cyclobenzaprine (FLEXERIL) 5 MG tablet TAKE 1-2 TABLETS (5-10 MG) BY MOUTH 3 TIMES DAILY AS NEEDED FOR MUSCLE SPASMS 30 tablet 3     diclofenac (VOLTAREN) 1 % topical gel Apply 2 g topically 4 times daily 50 g 1     Dupilumab (DUPIXENT) 300 MG/2ML SOPN Inject 300 mg Subcutaneous every 14 days 2 mL 3     DUPIXENT 200 MG/1.14ML SOSY INJECT THE CONTENTS OF 1 SYRINGE (200 MG) UNDER THE SKIN EVERY 14 DAYS 2.28 mL 1     famotidine (PEPCID) 20 MG tablet Take 1 tablet (20 mg) by mouth 2 times daily 90 tablet 3     fluticasone-vilanterol (BREO ELLIPTA) 200-25 MCG/INH inhaler Inhale 1 puff into the lungs daily 60 each 11     furosemide (LASIX) 20 MG tablet Take 1 tablet (20 mg) by mouth daily for 10 days 10 tablet 1     gabapentin (NEURONTIN) 300 MG capsule Take 2 capsules (600 mg) by mouth 3 times daily 180 capsule 3     Global Inject Ease Lancets 30G MISC Use twice daily       guaiFENesin (ROBITUSSIN) 100 MG/5ML liquid Take 10 mLs (200 mg) by mouth every 4 hours as needed for cough 200 mL 1     hydrochlorothiazide (MICROZIDE) 12.5 MG capsule TAKE 1 CAPSULE (12.5 MG TOTAL) BY MOUTH DAILY FOR BLOOD PRESSURE 90 capsule 2     hydrocortisone (CORTAID) 1 % external cream Apply topically 2 times daily 60 g 0     Insulin Aspart FlexPen 100 UNIT/ML SOPN GIVE BEFORE MEALS: FOR PRE-MEAL GLUCOSE: 140-189 GIVE 1 UNIT, 190-239 GIVE 2 UNITS, 240-289 GIVE 3 UNITS, 290-339 GIVE 4 UNITS, =OR>340 GIVE 6 UNITS *84* 15 mL 0     insulin glargine (LANTUS PEN) 100 UNIT/ML pen Inject 10 Units Subcutaneous every morning AND 24 Units At Bedtime. 15 mL 3     insulin pen needle (32G X 4 MM) 32G X 4 MM miscellaneous Use one pen needles daily or as directed. 200 each 11     ipratropium - albuterol 0.5 mg/2.5 mg/3 mL (DUONEB) 0.5-2.5 (3) MG/3ML neb solution  Take 1 vial (3 mLs) by nebulization every 6 hours as needed for shortness of breath / dyspnea or wheezing 240 mL 11     meclizine (ANTIVERT) 25 MG tablet Take 25 mg by mouth daily as needed        metFORMIN (GLUCOPHAGE) 1000 MG tablet Take 1 tablet (1,000 mg) by mouth 2 times daily (with meals) 180 tablet 1     metFORMIN (GLUCOPHAGE-XR) 500 MG 24 hr tablet Take 1 tablet (500 mg) by mouth daily (with dinner) 90 tablet 1     metoprolol tartrate (LOPRESSOR) 25 MG tablet TAKE 1 TABLET (25 MG TOTAL) BY MOUTH 2 (TWO) TIMES A DAY FOR BLOOD PRESSURE 180 tablet 3     montelukast (SINGULAIR) 10 MG tablet Take 1 tablet (10 mg) by mouth At Bedtime 30 tablet 6     omeprazole (PRILOSEC) 40 MG DR capsule Take 1 capsule (40 mg) by mouth daily 90 capsule 3     ondansetron (ZOFRAN) 4 MG tablet Take 1 tablet (4 mg) by mouth every 8 hours as needed for nausea 30 tablet 1     Polyethylene Glycol 3350 (PEG 3350) 17 GM/SCOOP POWD MIX 17 GRAMS WITH LIQUID AND DRINK ONCE DAILY FOR CONSTIPATION 510 g 12     polyvinyl alcohol (ARTIFICIAL TEARS) 1.4 % ophthalmic solution Place 1 drop into both eyes as needed for dry eyes 15 mL 3     predniSONE (DELTASONE) 2.5 MG tablet Take 2.5 mg by mouth daily        predniSONE (DELTASONE) 20 MG tablet Take 2 tabs daily x 5 days 10 tablet 0     sucralfate (CARAFATE) 1 GM tablet TAKE 1 TABLET (1 G TOTAL) BY MOUTH 4 (FOUR) TIMES A DAY BEFORE MEALS AND AT BEDTIME. TAKE 1 HOUR PRIOR TO MEALS 120 tablet 11     tiotropium (SPIRIVA RESPIMAT) 2.5 MCG/ACT inhalation aerosol Inhale 2 puffs into the lungs daily 4 g 1       ALLERGIES:  No Known Allergies    PAST MEDICAL HISTORY:   Past Medical History:   Diagnosis Date     Acute asthma exacerbation 01/06/2020     Anxiety      Arthritis      Asthma exacerbation 11/19/2015     Chronic obstructive pulmonary disease, unspecified COPD type (H)      COPD (chronic obstructive pulmonary disease) (H)      Coronary artery disease due to lipid rich plaque      Depression       Epigastric pain 12/15/2021     Essential hypertension      GERD (gastroesophageal reflux disease)      Infection due to  novel coronavirus 2022    positive with COVID-19 on January 3, 2022     Latent tuberculosis 2019     Microcytic anemia 2019     S/P coronary artery stent placement 2018     TB lung, latent     9 mos INH        PAST SURGICAL HISTORY:   Past Surgical History:   Procedure Laterality Date     CORONARY STENT PLACEMENT       CV CORONARY ANGIOGRAM N/A 2018    Procedure: Coronary Angiogram;  Surgeon: Angelo Serrano MD;  Location: [facility];  Service:      SD ESOPHAGOGASTRODUODENOSCOPY TRANSORAL DIAGNOSTIC N/A 12/10/2018    Procedure: ESOPHAGOGASTRODUODENOSCOPY (EGD);  Surgeon: Eddie Renteria MD;  Location: [facility];  Service: Gastroenterology     SD ESOPHAGOGASTRODUODENOSCOPY TRANSORAL DIAGNOSTIC N/A 12/3/2020    Procedure: ESOPHAGOGASTRODUODENOSCOPY (EGD) with biospies ;  Surgeon: Avi Crow MD;  Location: [facility];  Service: Gastroenterology     [facility] COLONOSCOPY W/WO BRUSH/WASH N/A 12/10/2018    Procedure: COLONOSCOPY with polypectomy using biopsy forceps;  Surgeon: Eddie Renteria MD;  Location: [facility];  Service: Gastroenterology       HABITS/SOCIAL HISTORY:    Social History     Tobacco Use     Smoking status: Former Smoker     Packs/day: 1.00     Years: 30.00     Pack years: 30.00     Types: Cigarettes, Cigarettes, Cigarettes     Quit date: 2003     Years since quittin.3     Smokeless tobacco: Never Used     Tobacco comment: No passive exposure   Substance Use Topics     Alcohol use: No         FAMILY HISTORY:    Family History   Problem Relation Age of Onset     Other - See Comments Mother          of an intestinal problem     Ulcers Father          of gastritis     Breast Cancer No family hx of         REVIEW OF SYSTEMS:  Comprehensive 11 point review of systems was reviewed. Positives are as noted  below; pertinent findings are as noted in the HPI.     Patient Supplied Answers to Review of Systems            PHYSICAL EXAMINATION:  General: The patient was alert and conversant, and in no acute distress. Patient accompanied by her daughter and also a Italian  via computer.   Eyes: PERRL, conjunctiva and lids normal, sclera nonicteric.  Nose: Anterior rhinoscopy: no gross abnormalities. no  bleeding; no  mucopurulence; septum grossly normal, mild mucoid drainage and/or crusting.  Oral cavity/oropharynx: No masses or lesions. Dentition in fair condition, appears ground down. Floor of mouth and oral tongue soft to palpation. Tongue mobility and palate elevation intact and symmetric.  Ears: Normal auricles, external auditory canals bilaterally. Visualized portions of tympanic membranes normal bilaterally.   Neck: No palpable cervical lymphadenopathy. There was no significant tenderness to palpation of the thyrohyoid space, which was narrow. No obvious thyroid abnormality. Landmarks palpable.  Resp: Breathing comfortably, no stridor or stertor.  Neuro: Symmetric facial function. Other cranial nerves as documented above.  Psych: Normal affect, pleasant and cooperative.  Voice/speech: Mild dysphonia characterized by breathiness, roughness and strain.  Extremities: No cyanosis, clubbing, or edema of the upper extremities.      Intake scores  Total Score for Last Patient-Answered VHI Questionnaire  No flowsheet data found.  Total Score for Last Patient-Answered EAT Questionnaire  No flowsheet data found.  Total Score for Last Patient-Answered CSI Questionnaire  No flowsheet data found.          PROCEDURE:   Flexible fiberoptic laryngoscopy and laryngovideostroboscopy  Indications: This procedure was warranted to evaluate the patient's laryngeal anatomy and function. Risks, benefits, and alternatives were discussed.  Description: After written informed consent was obtained, a time-out was performed to confirm  patient identity, procedure, and procedure site. Topical 3% lidocaine with 0.25% phenylephrine was applied to the nasal cavities. I performed the endoscopy and no complications were apparent. Continuous and stroboscopic light were utilized to assess routine phonation and variable frequency phonation.  Performed by: Elizabeth Brice MD MPH  SLP: Kunal Mendoza, PhD, CCC-SLP   Findings: Normal nasopharynx. Normal base of tongue, valleculae, and epiglottis. Vocal fold mobility: right: normal; left: normal. Medial edges of the vocal folds: smooth and straight. No focal mucosal lesions were observed on the true vocal folds. Glissade produced appropriate elongation. There was moderate to severe supraglottic recruitment with connected speech. Mucosa of false vocal folds, aryepiglottic folds, piriform sinuses, and posterior glottis unremarkable. Airway was patent. Obvious paradoxical vocal fold motion with imitating symptoms. Response to the therapy probes was good.      The addition of stroboscopy allowed evaluation of the mucosal wave.   Amplitude: right: normal; left: normal. Symmetry: intermittent symmetry. Closure pattern: complete. Closure plane: at glottic level. Phase distribution: normal.            Imitating symptoms      Improvement with rescue breathing probes          IMAGING/RESULTS reviewed, with pertinent report excerpts below:  EXAM: XR CHEST 2 VW  DATE/TIME: 3/10/2022   IMPRESSION: Heart and mediastinal size normal. No acute airspace opacities. No pleural effusion or pneumothorax. There is a benign 2 mm granuloma involving the mid right lung.    EXAM: CT CHEST PULMONARY EMBOLISM W CONTRAST  DATE/TIME: 12/15/2021 4:39 PM   INDICATION: Shortness of breath and COVID positive  IMPRESSION:   1.  No pulmonary embolism.   2.  Minimal basilar opacities typical of COVID.   3.  Hepatic steatosis.    EXAM: XR ESOPHAGRAM  DATE/TIME: 8/31/2020   IMPRESSION:  1.  Mild presbyesophagus. Otherwise unremarkable  "esophagram. No esophageal stricture or suspicious filling defect.    PFTs 4/17/17:   \"Impression:  Full Pulmonary Function Test is abnormal.  PFTs are consistent with severe obstructive disease. Spirometry is not consistent with reversibility.\"      IMPRESSION AND PLAN:   Kulwant Amin presents with paradoxical vocal fold motion and chronic cough as well as intermittent dysphagia and dysphonia in the context of a complex PMH including asthma, latent TB, COPD, and CAD s/p stent placement.    She responded well to rescue breathing probes today. I recommended speech therapy as initial primary management, with goals including reducing laryngeal irritability, improving laryngeal efficiency, decreasing vocal fold trauma and improving respiratory/phonatory coordination. We discussed that if her dysphagia persists or worsens, formal evaluation with VFSS and esophagram could be considered. We discussed that while we do expect speech therapy to help with her PVFM symptoms, she should continue all of her current medications as directed by her other care providers since having PVFM does not obviate her asthma or COPD history.     I asked the patient to return if her symptoms persist or worsen despite the steps above. I appreciate the opportunity to participate in the care of this patient.     Today's visit required additional screening time, PPE, and cleaning measures to allow for a safe in-person visit, due to the public health emergency.  I spent a total of 52 minutes on 4/25/2022 in chart review, review of tests, patient visit, documentation, care coordination, and/or discussion with other providers about the issues documented above, separate from any documented procedure(s).    Social determinants of health affecting care include: limited English proficiency.    "

## 2022-04-25 NOTE — LETTER
"4/25/2022       RE: Kulwant Amin  [address] [address]     Dear Colleague,    Thank you for referring your patient, Kulwant Amin, to the [facility] [place] at [facility]. Please see a copy of my visit note below.    Dear Dr. Doung:    I had the pleasure of meeting Kulwant Amin in consultation at the [facility] of the [facility] at your request, for evaluation of breathing problems and chronic cough. The note from our visit follows. Speech recognition software may have been used in the documentation below; input is reviewed before signature to the best of my ability. I appreciate the opportunity to participate in the care of this pleasant patient.    Please feel free to contact me with any questions.    Sincerely yours,      Elizabeth Brice M.D., M.P.H.  , Laryngology  Director, [facility]  Otolaryngology- Head & Neck Surgery  [phone]          =====    HISTORY OF PRESENT ILLNESS:   Kulwant Amin is a pleasant 52-year-old Azeri-speaking female with a past medical history including organic fuel exposure, hypereosinophilia, nondiagnostic PFTs, anxiety, arthritis, questionable asthma versus COPD, diabetes, hypertension and a prior history of SVT, who presents with a greater than 10 year history of throat concerns.       Voice  Sometimes, her voice \"gets stuck\" for a few minutes at a time and can then recover.      Swallowing  No concerns. Sometimes, she has trouble with solids, especially when having coughing.  Does not feel like things get down the wrong way in general.    Cough/Throat-clearing  She has frequent dry cough on a daily basis since 2012, with no definite cause. Coughing can lead to episodes of breathing difficulty and wheezing. At times, she has a severe cough (about once every 4-6 months) when she " develops significant breathing difficulty, and feels afraid she is not going to survive. The coughing fits last 5-10 minutes. Triggers include cold air, strong smells.   Prednisone tapers and albuterol can help. It tends to be a dry cough, and sometimes has a little bit of mucus.    Breathing  She has had breathing problems since 2012, with a relatively sudden onset, and no definite cause. Her asthma has been difficult to control. She has been treated with a variety of medications including Breo, Spiriva, pantoprazole, Dupixent injections, and albuterol. Dupixent seems to help.    PE study was negative.  She had prior cardiac catheterization.    Her breathing is worse with exertion, and worse with cold weather. She has a burning sensation in the chest. He reports she does not have noisy breathing usually, but sometimes has wheezing. It seems more difficult to breathe in. Sometimes, she gets inspiratory stridor.      Throat discomfort  No concerns.      Reflux-type symptoms  She experiences heartburn/indigestion rarely. She is taking reflux medications.      Prior Epic/outside records were reviewed for this visit, including:  Dr. Marie 3/17/22  Dr. Duong 4/14/22      MEDICATIONS:     Current Outpatient Medications   Medication Sig Dispense Refill     acetaminophen (TYLENOL) 500 MG tablet Take 2 tablets (1,000 mg) by mouth every 8 hours 100 tablet 0     albuterol (PROAIR HFA/PROVENTIL HFA/VENTOLIN HFA) 108 (90 Base) MCG/ACT inhaler Inhale 2 puffs into the lungs every 4 hours as needed for shortness of breath / dyspnea 4 g 11     albuterol (PROVENTIL) (2.5 MG/3ML) 0.083% neb solution Inhale 2.5 mg into the lungs every 6 hours as needed        ALLERGY RELIEF 10 MG tablet Take 10 mg by mouth daily       amitriptyline (ELAVIL) 10 MG tablet Take 2 tablets by mouth At Bedtime        ASPIRIN LOW DOSE 81 MG EC tablet TAKE 1 TABLET (81 MG TOTAL) BY MOUTH DAILY. 90 tablet 3     atorvastatin (LIPITOR) 80 MG tablet TAKE 1 TABLET  (80 MG TOTAL) BY MOUTH AT BEDTIME FOR CHOLESTEROL 90 tablet 3     B-D U/F 31G X 8 MM insulin pen needle USE ONE PEN NEEDLE DAILY AS NEEDED FOR  each 1     Continuous Blood Gluc  (FREESTYLE MONICA 14 DAY READER) MICHAEL Uses daily       Continuous Blood Gluc Sensor (FREESTYLE MONICA 14 DAY SENSOR) MISC USE 1 UNITS AS DIRECTED EVERY 14 (FOURTEEN) DAYS. 2 each 3     CONTOUR NEXT TEST test strip Pt checking blood sugar 2x per day       cyclobenzaprine (FLEXERIL) 5 MG tablet TAKE 1-2 TABLETS (5-10 MG) BY MOUTH 3 TIMES DAILY AS NEEDED FOR MUSCLE SPASMS 30 tablet 3     diclofenac (VOLTAREN) 1 % topical gel Apply 2 g topically 4 times daily 50 g 1     Dupilumab (DUPIXENT) 300 MG/2ML SOPN Inject 300 mg Subcutaneous every 14 days 2 mL 3     DUPIXENT 200 MG/1.14ML SOSY INJECT THE CONTENTS OF 1 SYRINGE (200 MG) UNDER THE SKIN EVERY 14 DAYS 2.28 mL 1     famotidine (PEPCID) 20 MG tablet Take 1 tablet (20 mg) by mouth 2 times daily 90 tablet 3     fluticasone-vilanterol (BREO ELLIPTA) 200-25 MCG/INH inhaler Inhale 1 puff into the lungs daily 60 each 11     furosemide (LASIX) 20 MG tablet Take 1 tablet (20 mg) by mouth daily for 10 days 10 tablet 1     gabapentin (NEURONTIN) 300 MG capsule Take 2 capsules (600 mg) by mouth 3 times daily 180 capsule 3     Global Inject Ease Lancets 30G MISC Use twice daily       guaiFENesin (ROBITUSSIN) 100 MG/5ML liquid Take 10 mLs (200 mg) by mouth every 4 hours as needed for cough 200 mL 1     hydrochlorothiazide (MICROZIDE) 12.5 MG capsule TAKE 1 CAPSULE (12.5 MG TOTAL) BY MOUTH DAILY FOR BLOOD PRESSURE 90 capsule 2     hydrocortisone (CORTAID) 1 % external cream Apply topically 2 times daily 60 g 0     Insulin Aspart FlexPen 100 UNIT/ML SOPN GIVE BEFORE MEALS: FOR PRE-MEAL GLUCOSE: 140-189 GIVE 1 UNIT, 190-239 GIVE 2 UNITS, 240-289 GIVE 3 UNITS, 290-339 GIVE 4 UNITS, =OR>340 GIVE 6 UNITS *84* 15 mL 0     insulin glargine (LANTUS PEN) 100 UNIT/ML pen Inject 10 Units Subcutaneous every  morning AND 24 Units At Bedtime. 15 mL 3     insulin pen needle (32G X 4 MM) 32G X 4 MM miscellaneous Use one pen needles daily or as directed. 200 each 11     ipratropium - albuterol 0.5 mg/2.5 mg/3 mL (DUONEB) 0.5-2.5 (3) MG/3ML neb solution Take 1 vial (3 mLs) by nebulization every 6 hours as needed for shortness of breath / dyspnea or wheezing 240 mL 11     meclizine (ANTIVERT) 25 MG tablet Take 25 mg by mouth daily as needed        metFORMIN (GLUCOPHAGE) 1000 MG tablet Take 1 tablet (1,000 mg) by mouth 2 times daily (with meals) 180 tablet 1     metFORMIN (GLUCOPHAGE-XR) 500 MG 24 hr tablet Take 1 tablet (500 mg) by mouth daily (with dinner) 90 tablet 1     metoprolol tartrate (LOPRESSOR) 25 MG tablet TAKE 1 TABLET (25 MG TOTAL) BY MOUTH 2 (TWO) TIMES A DAY FOR BLOOD PRESSURE 180 tablet 3     montelukast (SINGULAIR) 10 MG tablet Take 1 tablet (10 mg) by mouth At Bedtime 30 tablet 6     omeprazole (PRILOSEC) 40 MG DR capsule Take 1 capsule (40 mg) by mouth daily 90 capsule 3     ondansetron (ZOFRAN) 4 MG tablet Take 1 tablet (4 mg) by mouth every 8 hours as needed for nausea 30 tablet 1     Polyethylene Glycol 3350 (PEG 3350) 17 GM/SCOOP POWD MIX 17 GRAMS WITH LIQUID AND DRINK ONCE DAILY FOR CONSTIPATION 510 g 12     polyvinyl alcohol (ARTIFICIAL TEARS) 1.4 % ophthalmic solution Place 1 drop into both eyes as needed for dry eyes 15 mL 3     predniSONE (DELTASONE) 2.5 MG tablet Take 2.5 mg by mouth daily        predniSONE (DELTASONE) 20 MG tablet Take 2 tabs daily x 5 days 10 tablet 0     sucralfate (CARAFATE) 1 GM tablet TAKE 1 TABLET (1 G TOTAL) BY MOUTH 4 (FOUR) TIMES A DAY BEFORE MEALS AND AT BEDTIME. TAKE 1 HOUR PRIOR TO MEALS 120 tablet 11     tiotropium (SPIRIVA RESPIMAT) 2.5 MCG/ACT inhalation aerosol Inhale 2 puffs into the lungs daily 4 g 1       ALLERGIES:  No Known Allergies    PAST MEDICAL HISTORY:   Past Medical History:   Diagnosis Date     Acute asthma exacerbation 01/06/2020     Anxiety       Arthritis      Asthma exacerbation 2015     Chronic obstructive pulmonary disease, unspecified COPD type (H)      COPD (chronic obstructive pulmonary disease) (H)      Coronary artery disease due to lipid rich plaque      Depression      Epigastric pain 12/15/2021     Essential hypertension      GERD (gastroesophageal reflux disease)      Infection due to  novel coronavirus 2022    positive with COVID-19 on January 3, 2022     Latent tuberculosis 2019     Microcytic anemia 2019     S/P coronary artery stent placement 2018     TB lung, latent     9 mos INH        PAST SURGICAL HISTORY:   Past Surgical History:   Procedure Laterality Date     CORONARY STENT PLACEMENT       CV CORONARY ANGIOGRAM N/A 2018    Procedure: Coronary Angiogram;  Surgeon: Angelo Serrano MD;  Location: [facility];  Service:      NC ESOPHAGOGASTRODUODENOSCOPY TRANSORAL DIAGNOSTIC N/A 12/10/2018    Procedure: ESOPHAGOGASTRODUODENOSCOPY (EGD);  Surgeon: Eddie Renteria MD;  Location: [facility];  Service: Gastroenterology     NC ESOPHAGOGASTRODUODENOSCOPY TRANSORAL DIAGNOSTIC N/A 12/3/2020    Procedure: ESOPHAGOGASTRODUODENOSCOPY (EGD) with biospies ;  Surgeon: Avi Crow MD;  Location: [facility];  Service: Gastroenterology     [facility] COLONOSCOPY W/WO BRUSH/WASH N/A 12/10/2018    Procedure: COLONOSCOPY with polypectomy using biopsy forceps;  Surgeon: Eddie Renteria MD;  Location: [facility];  Service: Gastroenterology       HABITS/SOCIAL HISTORY:    Social History     Tobacco Use     Smoking status: Former Smoker     Packs/day: 1.00     Years: 30.00     Pack years: 30.00     Types: Cigarettes, Cigarettes, Cigarettes     Quit date: 2003     Years since quittin.3     Smokeless tobacco: Never Used     Tobacco comment: No passive exposure   Substance Use Topics     Alcohol use: No         FAMILY HISTORY:    Family History   Problem Relation Age of Onset     Other -  See Comments Mother          of an intestinal problem     Ulcers Father          of gastritis     Breast Cancer No family hx of         REVIEW OF SYSTEMS:  Comprehensive 11 point review of systems was reviewed. Positives are as noted below; pertinent findings are as noted in the HPI.     Patient Supplied Answers to Review of Systems            PHYSICAL EXAMINATION:  General: The patient was alert and conversant, and in no acute distress. Patient accompanied by her daughter and also a [facility]  via computer.   Eyes: PERRL, conjunctiva and lids normal, sclera nonicteric.  Nose: Anterior rhinoscopy: no gross abnormalities. no  bleeding; no  mucopurulence; septum grossly normal, mild mucoid drainage and/or crusting.  Oral cavity/oropharynx: No masses or lesions. Dentition in fair condition, appears ground down. Floor of mouth and oral tongue soft to palpation. Tongue mobility and palate elevation intact and symmetric.  Ears: Normal auricles, external auditory canals bilaterally. Visualized portions of tympanic membranes normal bilaterally.   Neck: No palpable cervical lymphadenopathy. There was no significant tenderness to palpation of the thyrohyoid space, which was narrow. No obvious thyroid abnormality. Landmarks palpable.  Resp: Breathing comfortably, no stridor or stertor.  Neuro: Symmetric facial function. Other cranial nerves as documented above.  Psych: Normal affect, pleasant and cooperative.  Voice/speech: Mild dysphonia characterized by breathiness, roughness and strain.  Extremities: No cyanosis, clubbing, or edema of the upper extremities.      Intake scores  Total Score for Last Patient-Answered VHI Questionnaire  No flowsheet data found.  Total Score for Last Patient-Answered EAT Questionnaire  No flowsheet data found.  Total Score for Last Patient-Answered CSI Questionnaire  No flowsheet data found.          PROCEDURE:   Flexible fiberoptic laryngoscopy and  laryngovideostroboscopy  Indications: This procedure was warranted to evaluate the patient's laryngeal anatomy and function. Risks, benefits, and alternatives were discussed.  Description: After written informed consent was obtained, a time-out was performed to confirm patient identity, procedure, and procedure site. Topical 3% lidocaine with 0.25% phenylephrine was applied to the nasal cavities. I performed the endoscopy and no complications were apparent. Continuous and stroboscopic light were utilized to assess routine phonation and variable frequency phonation.  Performed by: Elizabeth Brice MD MPH  SLP: Kunal Mendoza, PhD, CCC-SLP   Findings: Normal nasopharynx. Normal base of tongue, valleculae, and epiglottis. Vocal fold mobility: right: normal; left: normal. Medial edges of the vocal folds: smooth and straight. No focal mucosal lesions were observed on the true vocal folds. Glissade produced appropriate elongation. There was moderate to severe supraglottic recruitment with connected speech. Mucosa of false vocal folds, aryepiglottic folds, piriform sinuses, and posterior glottis unremarkable. Airway was patent. Obvious paradoxical vocal fold motion with imitating symptoms. Response to the therapy probes was good.      The addition of stroboscopy allowed evaluation of the mucosal wave.   Amplitude: right: normal; left: normal. Symmetry: intermittent symmetry. Closure pattern: complete. Closure plane: at glottic level. Phase distribution: normal.            Imitating symptoms      Improvement with rescue breathing probes          IMAGING/RESULTS reviewed, with pertinent report excerpts below:  EXAM: XR CHEST 2 VW  DATE/TIME: 3/10/2022   IMPRESSION: Heart and mediastinal size normal. No acute airspace opacities. No pleural effusion or pneumothorax. There is a benign 2 mm granuloma involving the mid right lung.    EXAM: CT CHEST PULMONARY EMBOLISM W CONTRAST  DATE/TIME: 12/15/2021 4:39 PM   INDICATION:  "Shortness of breath and COVID positive  IMPRESSION:   1.  No pulmonary embolism.   2.  Minimal basilar opacities typical of COVID.   3.  Hepatic steatosis.    EXAM: XR ESOPHAGRAM  DATE/TIME: 8/31/2020   IMPRESSION:  1.  Mild presbyesophagus. Otherwise unremarkable esophagram. No esophageal stricture or suspicious filling defect.    PFTs 4/17/17:   \"Impression:  Full Pulmonary Function Test is abnormal.  PFTs are consistent with severe obstructive disease. Spirometry is not consistent with reversibility.\"      IMPRESSION AND PLAN:   Kulwant Amin presents with paradoxical vocal fold motion and chronic cough as well as intermittent dysphagia and dysphonia in the context of a complex PMH including asthma, latent TB, COPD, and CAD s/p stent placement.    She responded well to rescue breathing probes today. I recommended speech therapy as initial primary management, with goals including reducing laryngeal irritability, improving laryngeal efficiency, decreasing vocal fold trauma and improving respiratory/phonatory coordination. We discussed that if her dysphagia persists or worsens, formal evaluation with VFSS and esophagram could be considered. We discussed that while we do expect speech therapy to help with her PVFM symptoms, she should continue all of her current medications as directed by her other care providers since having PVFM does not obviate her asthma or COPD history.     I asked the patient to return if her symptoms persist or worsen despite the steps above. I appreciate the opportunity to participate in the care of this patient.     Today's visit required additional screening time, PPE, and cleaning measures to allow for a safe in-person visit, due to the public health emergency.  I spent a total of 52 minutes on 4/25/2022 in chart review, review of tests, patient visit, documentation, care coordination, and/or discussion with other providers about the issues documented above, separate from any documented " procedure(s).    Social determinants of health affecting care include: limited English proficiency.      Again, thank you for allowing me to participate in the care of your patient.      Sincerely,    Elizabeth Brice MD

## 2022-04-25 NOTE — PROGRESS NOTES
"Dear Dr. Duong:    I had the pleasure of meeting Kulwant Amin in consultation at the [facility] of the [facility] at your request, for evaluation of breathing problems and chronic cough. The note from our visit follows. Speech recognition software may have been used in the documentation below; input is reviewed before signature to the best of my ability. I appreciate the opportunity to participate in the care of this pleasant patient.    Please feel free to contact me with any questions.    Sincerely yours,      Elizabeth Brice M.D., M.P.H.  , Laryngology  Director, [facility]  Otolaryngology- Head & Neck Surgery  [phone]          =====    HISTORY OF PRESENT ILLNESS:   Kulwant Amin is a pleasant 52-year-old Portuguese-speaking female with a past medical history including organic fuel exposure, hypereosinophilia, nondiagnostic PFTs, anxiety, arthritis, questionable asthma versus COPD, diabetes, hypertension and a prior history of SVT, who presents with a greater than 10 year history of throat concerns.       Voice  Sometimes, her voice \"gets stuck\" for a few minutes at a time and can then recover.      Swallowing  No concerns. Sometimes, she has trouble with solids, especially when having coughing.  Does not feel like things get down the wrong way in general.    Cough/Throat-clearing  She has frequent dry cough on a daily basis since 2012, with no definite cause. Coughing can lead to episodes of breathing difficulty and wheezing. At times, she has a severe cough (about once every 4-6 months) when she develops significant breathing difficulty, and feels afraid she is not going to survive. The coughing fits last 5-10 minutes. Triggers include cold air, strong smells.   Prednisone tapers and albuterol can help. It tends to be a dry cough, and sometimes has a little bit of mucus.    Breathing  She has had breathing " problems since 2012, with a relatively sudden onset, and no definite cause. Her asthma has been difficult to control. She has been treated with a variety of medications including Breo, Spiriva, pantoprazole, Dupixent injections, and albuterol. Dupixent seems to help.    PE study was negative.  She had prior cardiac catheterization.    Her breathing is worse with exertion, and worse with cold weather. She has a burning sensation in the chest. He reports she does not have noisy breathing usually, but sometimes has wheezing. It seems more difficult to breathe in. Sometimes, she gets inspiratory stridor.      Throat discomfort  No concerns.      Reflux-type symptoms  She experiences heartburn/indigestion rarely. She is taking reflux medications.      Prior Epic/outside records were reviewed for this visit, including:  Dr. Marie 3/17/22  Dr. Duong 4/14/22      MEDICATIONS:     Current Outpatient Medications   Medication Sig Dispense Refill     acetaminophen (TYLENOL) 500 MG tablet Take 2 tablets (1,000 mg) by mouth every 8 hours 100 tablet 0     albuterol (PROAIR HFA/PROVENTIL HFA/VENTOLIN HFA) 108 (90 Base) MCG/ACT inhaler Inhale 2 puffs into the lungs every 4 hours as needed for shortness of breath / dyspnea 4 g 11     albuterol (PROVENTIL) (2.5 MG/3ML) 0.083% neb solution Inhale 2.5 mg into the lungs every 6 hours as needed        ALLERGY RELIEF 10 MG tablet Take 10 mg by mouth daily       amitriptyline (ELAVIL) 10 MG tablet Take 2 tablets by mouth At Bedtime        ASPIRIN LOW DOSE 81 MG EC tablet TAKE 1 TABLET (81 MG TOTAL) BY MOUTH DAILY. 90 tablet 3     atorvastatin (LIPITOR) 80 MG tablet TAKE 1 TABLET (80 MG TOTAL) BY MOUTH AT BEDTIME FOR CHOLESTEROL 90 tablet 3     B-D U/F 31G X 8 MM insulin pen needle USE ONE PEN NEEDLE DAILY AS NEEDED FOR  each 1     Continuous Blood Gluc  (FREESTYLE MONICA 14 DAY READER) MICHAEL Uses daily       Continuous Blood Gluc Sensor (FREESTYLE MONICA 14 DAY SENSOR) MISC USE 1  UNITS AS DIRECTED EVERY 14 (FOURTEEN) DAYS. 2 each 3     CONTOUR NEXT TEST test strip Pt checking blood sugar 2x per day       cyclobenzaprine (FLEXERIL) 5 MG tablet TAKE 1-2 TABLETS (5-10 MG) BY MOUTH 3 TIMES DAILY AS NEEDED FOR MUSCLE SPASMS 30 tablet 3     diclofenac (VOLTAREN) 1 % topical gel Apply 2 g topically 4 times daily 50 g 1     Dupilumab (DUPIXENT) 300 MG/2ML SOPN Inject 300 mg Subcutaneous every 14 days 2 mL 3     DUPIXENT 200 MG/1.14ML SOSY INJECT THE CONTENTS OF 1 SYRINGE (200 MG) UNDER THE SKIN EVERY 14 DAYS 2.28 mL 1     famotidine (PEPCID) 20 MG tablet Take 1 tablet (20 mg) by mouth 2 times daily 90 tablet 3     fluticasone-vilanterol (BREO ELLIPTA) 200-25 MCG/INH inhaler Inhale 1 puff into the lungs daily 60 each 11     furosemide (LASIX) 20 MG tablet Take 1 tablet (20 mg) by mouth daily for 10 days 10 tablet 1     gabapentin (NEURONTIN) 300 MG capsule Take 2 capsules (600 mg) by mouth 3 times daily 180 capsule 3     Global Inject Ease Lancets 30G MISC Use twice daily       guaiFENesin (ROBITUSSIN) 100 MG/5ML liquid Take 10 mLs (200 mg) by mouth every 4 hours as needed for cough 200 mL 1     hydrochlorothiazide (MICROZIDE) 12.5 MG capsule TAKE 1 CAPSULE (12.5 MG TOTAL) BY MOUTH DAILY FOR BLOOD PRESSURE 90 capsule 2     hydrocortisone (CORTAID) 1 % external cream Apply topically 2 times daily 60 g 0     Insulin Aspart FlexPen 100 UNIT/ML SOPN GIVE BEFORE MEALS: FOR PRE-MEAL GLUCOSE: 140-189 GIVE 1 UNIT, 190-239 GIVE 2 UNITS, 240-289 GIVE 3 UNITS, 290-339 GIVE 4 UNITS, =OR>340 GIVE 6 UNITS *84* 15 mL 0     insulin glargine (LANTUS PEN) 100 UNIT/ML pen Inject 10 Units Subcutaneous every morning AND 24 Units At Bedtime. 15 mL 3     insulin pen needle (32G X 4 MM) 32G X 4 MM miscellaneous Use one pen needles daily or as directed. 200 each 11     ipratropium - albuterol 0.5 mg/2.5 mg/3 mL (DUONEB) 0.5-2.5 (3) MG/3ML neb solution Take 1 vial (3 mLs) by nebulization every 6 hours as needed for shortness  of breath / dyspnea or wheezing 240 mL 11     meclizine (ANTIVERT) 25 MG tablet Take 25 mg by mouth daily as needed        metFORMIN (GLUCOPHAGE) 1000 MG tablet Take 1 tablet (1,000 mg) by mouth 2 times daily (with meals) 180 tablet 1     metFORMIN (GLUCOPHAGE-XR) 500 MG 24 hr tablet Take 1 tablet (500 mg) by mouth daily (with dinner) 90 tablet 1     metoprolol tartrate (LOPRESSOR) 25 MG tablet TAKE 1 TABLET (25 MG TOTAL) BY MOUTH 2 (TWO) TIMES A DAY FOR BLOOD PRESSURE 180 tablet 3     montelukast (SINGULAIR) 10 MG tablet Take 1 tablet (10 mg) by mouth At Bedtime 30 tablet 6     omeprazole (PRILOSEC) 40 MG DR capsule Take 1 capsule (40 mg) by mouth daily 90 capsule 3     ondansetron (ZOFRAN) 4 MG tablet Take 1 tablet (4 mg) by mouth every 8 hours as needed for nausea 30 tablet 1     Polyethylene Glycol 3350 (PEG 3350) 17 GM/SCOOP POWD MIX 17 GRAMS WITH LIQUID AND DRINK ONCE DAILY FOR CONSTIPATION 510 g 12     polyvinyl alcohol (ARTIFICIAL TEARS) 1.4 % ophthalmic solution Place 1 drop into both eyes as needed for dry eyes 15 mL 3     predniSONE (DELTASONE) 2.5 MG tablet Take 2.5 mg by mouth daily        predniSONE (DELTASONE) 20 MG tablet Take 2 tabs daily x 5 days 10 tablet 0     sucralfate (CARAFATE) 1 GM tablet TAKE 1 TABLET (1 G TOTAL) BY MOUTH 4 (FOUR) TIMES A DAY BEFORE MEALS AND AT BEDTIME. TAKE 1 HOUR PRIOR TO MEALS 120 tablet 11     tiotropium (SPIRIVA RESPIMAT) 2.5 MCG/ACT inhalation aerosol Inhale 2 puffs into the lungs daily 4 g 1       ALLERGIES:  No Known Allergies    PAST MEDICAL HISTORY:   Past Medical History:   Diagnosis Date     Acute asthma exacerbation 01/06/2020     Anxiety      Arthritis      Asthma exacerbation 11/19/2015     Chronic obstructive pulmonary disease, unspecified COPD type (H)      COPD (chronic obstructive pulmonary disease) (H)      Coronary artery disease due to lipid rich plaque      Depression      Epigastric pain 12/15/2021     Essential hypertension      GERD  (gastroesophageal reflux disease)      Infection due to 2019 novel coronavirus 2022    positive with COVID-19 on January 3, 2022     Latent tuberculosis 2019     Microcytic anemia 2019     S/P coronary artery stent placement 2018     TB lung, latent     9 mos INH        PAST SURGICAL HISTORY:   Past Surgical History:   Procedure Laterality Date     CORONARY STENT PLACEMENT       CV CORONARY ANGIOGRAM N/A 2018    Procedure: Coronary Angiogram;  Surgeon: Angelo Serrano MD;  Location: [facility];  Service:      MS ESOPHAGOGASTRODUODENOSCOPY TRANSORAL DIAGNOSTIC N/A 12/10/2018    Procedure: ESOPHAGOGASTRODUODENOSCOPY (EGD);  Surgeon: Eddie Renteria MD;  Location: [facility];  Service: Gastroenterology     MS ESOPHAGOGASTRODUODENOSCOPY TRANSORAL DIAGNOSTIC N/A 12/3/2020    Procedure: ESOPHAGOGASTRODUODENOSCOPY (EGD) with biospies ;  Surgeon: Avi Crow MD;  Location: [facility];  Service: Gastroenterology     [facility] COLONOSCOPY W/WO BRUSH/WASH N/A 12/10/2018    Procedure: COLONOSCOPY with polypectomy using biopsy forceps;  Surgeon: Eddie Renteria MD;  Location: [facility];  Service: Gastroenterology       HABITS/SOCIAL HISTORY:    Social History     Tobacco Use     Smoking status: Former Smoker     Packs/day: 1.00     Years: 30.00     Pack years: 30.00     Types: Cigarettes, Cigarettes, Cigarettes     Quit date: 2003     Years since quittin.3     Smokeless tobacco: Never Used     Tobacco comment: No passive exposure   Substance Use Topics     Alcohol use: No         FAMILY HISTORY:    Family History   Problem Relation Age of Onset     Other - See Comments Mother          of an intestinal problem     Ulcers Father          of gastritis     Breast Cancer No family hx of         REVIEW OF SYSTEMS:  Comprehensive 11 point review of systems was reviewed. Positives are as noted below; pertinent findings are as noted in the HPI.     Patient  Supplied Answers to Review of Systems            PHYSICAL EXAMINATION:  General: The patient was alert and conversant, and in no acute distress. Patient accompanied by her daughter and also a [facility]  via computer.   Eyes: PERRL, conjunctiva and lids normal, sclera nonicteric.  Nose: Anterior rhinoscopy: no gross abnormalities. no  bleeding; no  mucopurulence; septum grossly normal, mild mucoid drainage and/or crusting.  Oral cavity/oropharynx: No masses or lesions. Dentition in fair condition, appears ground down. Floor of mouth and oral tongue soft to palpation. Tongue mobility and palate elevation intact and symmetric.  Ears: Normal auricles, external auditory canals bilaterally. Visualized portions of tympanic membranes normal bilaterally.   Neck: No palpable cervical lymphadenopathy. There was no significant tenderness to palpation of the thyrohyoid space, which was narrow. No obvious thyroid abnormality. Landmarks palpable.  Resp: Breathing comfortably, no stridor or stertor.  Neuro: Symmetric facial function. Other cranial nerves as documented above.  Psych: Normal affect, pleasant and cooperative.  Voice/speech: Mild dysphonia characterized by breathiness, roughness and strain.  Extremities: No cyanosis, clubbing, or edema of the upper extremities.      Intake scores  Total Score for Last Patient-Answered VHI Questionnaire  No flowsheet data found.  Total Score for Last Patient-Answered EAT Questionnaire  No flowsheet data found.  Total Score for Last Patient-Answered CSI Questionnaire  No flowsheet data found.          PROCEDURE:   Flexible fiberoptic laryngoscopy and laryngovideostroboscopy  Indications: This procedure was warranted to evaluate the patient's laryngeal anatomy and function. Risks, benefits, and alternatives were discussed.  Description: After written informed consent was obtained, a time-out was performed to confirm patient identity, procedure, and procedure site. Topical 3%  lidocaine with 0.25% phenylephrine was applied to the nasal cavities. I performed the endoscopy and no complications were apparent. Continuous and stroboscopic light were utilized to assess routine phonation and variable frequency phonation.  Performed by: Elizabeth Brice MD MPH  SLP: Kunal Mendoza, PhD, CCC-SLP   Findings: Normal nasopharynx. Normal base of tongue, valleculae, and epiglottis. Vocal fold mobility: right: normal; left: normal. Medial edges of the vocal folds: smooth and straight. No focal mucosal lesions were observed on the true vocal folds. Glissade produced appropriate elongation. There was moderate to severe supraglottic recruitment with connected speech. Mucosa of false vocal folds, aryepiglottic folds, piriform sinuses, and posterior glottis unremarkable. Airway was patent. Obvious paradoxical vocal fold motion with imitating symptoms. Response to the therapy probes was good.      The addition of stroboscopy allowed evaluation of the mucosal wave.   Amplitude: right: normal; left: normal. Symmetry: intermittent symmetry. Closure pattern: complete. Closure plane: at glottic level. Phase distribution: normal.            Imitating symptoms      Improvement with rescue breathing probes          IMAGING/RESULTS reviewed, with pertinent report excerpts below:  EXAM: XR CHEST 2 VW  DATE/TIME: 3/10/2022   IMPRESSION: Heart and mediastinal size normal. No acute airspace opacities. No pleural effusion or pneumothorax. There is a benign 2 mm granuloma involving the mid right lung.    EXAM: CT CHEST PULMONARY EMBOLISM W CONTRAST  DATE/TIME: 12/15/2021 4:39 PM   INDICATION: Shortness of breath and COVID positive  IMPRESSION:   1.  No pulmonary embolism.   2.  Minimal basilar opacities typical of COVID.   3.  Hepatic steatosis.    EXAM: XR ESOPHAGRAM  DATE/TIME: 8/31/2020   IMPRESSION:  1.  Mild presbyesophagus. Otherwise unremarkable esophagram. No esophageal stricture or suspicious filling  "defect.    PFTs 4/17/17:   \"Impression:  Full Pulmonary Function Test is abnormal.  PFTs are consistent with severe obstructive disease. Spirometry is not consistent with reversibility.\"      IMPRESSION AND PLAN:   Kulwant Amin presents with paradoxical vocal fold motion and chronic cough as well as intermittent dysphagia and dysphonia in the context of a complex PMH including asthma, latent TB, COPD, and CAD s/p stent placement.    She responded well to rescue breathing probes today. I recommended speech therapy as initial primary management, with goals including reducing laryngeal irritability, improving laryngeal efficiency, decreasing vocal fold trauma and improving respiratory/phonatory coordination. We discussed that if her dysphagia persists or worsens, formal evaluation with VFSS and esophagram could be considered. We discussed that while we do expect speech therapy to help with her PVFM symptoms, she should continue all of her current medications as directed by her other care providers since having PVFM does not obviate her asthma or COPD history.     I asked the patient to return if her symptoms persist or worsen despite the steps above. I appreciate the opportunity to participate in the care of this patient.     Today's visit required additional screening time, PPE, and cleaning measures to allow for a safe in-person visit, due to the public health emergency.  I spent a total of 52 minutes on 4/25/2022 in chart review, review of tests, patient visit, documentation, care coordination, and/or discussion with other providers about the issues documented above, separate from any documented procedure(s).    Social determinants of health affecting care include: limited English proficiency.  "

## 2022-04-25 NOTE — PATIENT INSTRUCTIONS
Patient needs to be scheduled for a Return Voice SLP appointment with Kunal Mendoza PhD CCC-SLP    Number and Frequency of sessions:  2 appointments with approximately 14 days between each. These should be in person with an .    NOTE, UNLESS SPECIFICALLY STATED IN SPECIAL INSTRUCTIONS ABOVE  Virtual appointments may be scheduled during in person times (but not the reverse)  When availability is limited the first appointment may be scheduled even if subsequent appointments aren't available in the prescribed timeframe (e.g. 14 days between appointments)  There should be at least 7 days between appointments  Once a patient has begun therapy they should only see that specific SLP (this does not include initial or follow-up evaluation)  Patient may schedule as many or as few of the sessions listed above as they desire

## 2022-04-25 NOTE — PROGRESS NOTES
"[facility] VOICE CLINIC  CLINICAL EVALUATION REPORT    Patient: Kulwant Amin  Date of Service: 4/25/2022  Seen in conjunction with: Dr. Elizabeth Brice  Referring physician: Dr. Brice    HISTORY  PATIENT INFORMATION  Kulwant Amin is a 52 year old female presenting today for initial evaluation of voice and resonance due to concerns with chronic coughing and breathing difficulties. Salient details of her symptom history are as follows:    The patient was seen today with the assistance of a [facility]  approved by [place].    The patient reports a history of chronic coughing and associated breathing difficulty that began approximately 10 years ago without an obvious inciting event.  She describes a dry cough that is triggered by exercise, cold air, and strong smells like perfumes, gas, and diesel smells.  The cough occurs constantly throughout the day, and she feels that she has very poor control of it.  The cough often develops into coughing fits that last for 5 to 10 minutes.  During these coughing fits, she often experiences difficulties inhaling, which are associated with inspiratory stridor.  She does also have lung disease, for which she is followed by pulmonary medicine.  Some of her medications (prednisone and albuterol) seem to help with this, but did not fully alleviate her symptoms.    Regarding voice, the patient reports that after coughing fits, her voice will get \"stuck\" for a couple of minutes ( clarified that this seemed to imply her voice was totally gone for a few minutes), after which time, it returns and is slightly raspy for another several minutes before returning completely to normal.  She does not otherwise experience voice difficulties.    The patient denies swallowing difficulties generally, although she does feel as though when she is having an increase in coughing, it makes it challenging to eat solid foods.    She denies symptoms of reflux.    OBJECTIVE FINDINGS  PATIENT " REPORTED MEASURES  Patient Supplied Answers To [facility] Intake Voice Questionnaire  No flowsheet data found.     Patient Supplied Answers To VHI Questionnaire  No flowsheet data found.     Patient Supplied Answers To CSI Questionnaire  No flowsheet data found.     Patient Supplied Answers To EAT Questionnaire  No flowsheet data found.        Self-Ratings as discussed with patient:  No flowsheet data found.     PERCEPTUAL EVALUATION (03561)  VOICE/ SPEECH/ NON-COMMUNICATIVE LARYNGEAL BEHAVIORS EVALUATION    Palpation of the laryngeal area shows:    supple musculature    Breathing pattern:     inspirations are inadequate in volume and frequency    Cough/ Throat clear:    cough is primary and non productive     Kulwant states today is a typical voice day, with clinician observing voice quality characterized by:    Roughness: Mild Intermittent    Breathiness: Mild Consistent    Strain: Mild Consistent    Habitual pitch is perceptually within normal limits and appropriate for age and gender    Loudness is WNL and is appropriate for the setting    GRADE, ROUGHNESS, BREATHINESS, ASTHENIA, STRAIN  (GRBAS) scale:  G(1)R(1)B(1)A(0)S(1)    LARYNGEAL EXAMINATION  Procedure: Flexible endoscopy with chip-tip technology with stroboscopy, right nostril; topical anesthesia with 3% Lidocaine and 0.25% phenylephrine was applied.   Performed by: Dr. Elizabeth Brice  Verbal consent was obtained and witnessed prior to this procedure.   A time-out was performed, verifying patient, procedure, and site.   This exam shows:    Velar Function: Not assessed    Secretions: Within normal limits    Laryngeal Mucosa: essentially healthy laryngeal mucosa    Vocal fold mucosa:    o RTVF - within normal limits, no visible lesions  o LTVF - within normal limits, no visible lesions    Vocal fold function:   o Vocal folds are mobile and meet at midline  o Movement is brisk and symmetric  o Exam is neurologically normal     Airway is adequate  o When asked to  "reproduce breathing symptoms, the patient demonstrated abduction of the glottis on inhalation  o She was able to entirely eliminate this with pursed lip inhalation    Elongation of the vocal folds for pitch increase is normal    Fatigue is evident during a task of 20 quickly repeated vowels; poor coordination between breath flow and phonation    Moderate to severe anterior-posterior constriction of the supraglottic larynx during connected speech     The addition of stroboscopy provided the following information:   o Vibratory Behavior: Present bilaterally  o Amplitude Right: Within normal limits  o Amplitude Left: Within normal limits  o Mucosal Wave Right: Within normal limits  o Mucosal Wave Left: Within normal limits  o Glottic closure:  on phonation glottic closure is normal  and With a substantial open phase predominance  o Symmetry: Mostly symmetric  o Periodicity: Mostly periodic    THERAPEUTIC ACTIVITIES (CPT 73343)  The patient learned:    Techniques for oral configurations to use during inspiration, to provide better abduction and arrest inhalatory stridor; these included: inhaling through rounded lips; inhaling through the nose with closed lips; and short, repeated sniffs followed by exhalation with puffed cheeks, and exhalation\" SH\"    Cough suppression strategies  o Identification of precursor sensation  - Tickle in throat  o Alternative strategies  - Hard swallow  - Sips of very hot and very cold liquid  - Slow nasal inhalation followed by exhalation\" SH\"  - Pursed lip inhalation followed by exhalation with puffed cheeks  o With moderate cueing, the patient was able to implement the strategies and thereby reduce frequency and duration of coughing for 20 minutes.    ASSESSMENT / PLAN  IMPRESSIONS: Kulwant Amin is a 52 year old female with Dyspnea On Exertion (R06.09) and Chronic Cough (R05.3).  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    A course of speech therapy is recommended to help reduce chronic cough and learned optimal laryngeal mechanics to stop paradoxical vocal fold motion disorder.    She demonstrates a Excellent prognosis for improvement given adherence to therapeutic recommendations.     Positive indicators: positive response to therapy probes    Negative indicators: None    DURATION / FREQUENCY: 2 bi-weekly therapy sessions    Goals:  Patient goal:    To understand the problem and fix it as much as possible     Short-term goal(s): Within the first 4 sessions, Kulwant will:  -- demonstrate knowledge and understanding of irritable larynx patterns and symptoms in order to improve functional therapy outcomes with 90% accy.  -- be able to independently list key factors in maintenance of good vocal hygiene with 80% accuracy, and report on their use outside the therapy room.  -- demonstrate improved awareness of throat clearing/cough: acknowledging >75% of all cough events during session time with no clinician support.  -- demonstrate provided cough and throat clear suppression/substitution strategies from memory independently with 90% accuracy.  -- demonstrate silent inhalation and abdominal breathing pattern in order to optimize breathing mechanics with 90% accy and min cues.    Long-term goal(s): In 3 months, Kulwant will:  -- report a 90% resolution of Coughing and breathing symptoms during a week of performing typical personal, social, and professional activities.    This treatment plan was developed with the patient who agreed with the recommendations.    TOTAL SERVICE TIME: 60 minutes  EVALUATION OF VOICE AND RESONANCE (11226)  TREATMENT (23007)  NO CHARGE FACILITY FEE (16229)    Speech recognition software may have been used in this documentation; input is reviewed before signature to the best of my ability.     Kunal Mendoza, Ph.D., [facility]-SLP  Speech-Language Pathologist-[facility]  [facility]  [phone]  he/him/his

## 2022-04-25 NOTE — LETTER
"4/25/2022       RE: Kulwant Amin  [address] [facility] [address] [facility] [address]     Dear Colleague,    Thank you for referring your patient, Kulwant Amin, to the [facility]ICE [facility]IN[facility]. Please see a copy of my visit note below.    [facility] VOICE CLINIC  CLINICAL EVALUATION REPORT    Patient: Kulwant Amin  Date of Service: 4/25/2022  Seen in conjunction with: Dr. Elizabeth Brice  Referring physician: Dr. Brice    HISTORY  PATIENT INFORMATION  Kulwant Amin is a 52 year old female presenting today for initial evaluation of voice and resonance due to concerns with chronic coughing and breathing difficulties. Salient details of her symptom history are as follows:    The patient was seen today with the assistance of a [facility]  approved by [place].    The patient reports a history of chronic coughing and associated breathing difficulty that began approximately 10 years ago without an obvious inciting event.  She describes a dry cough that is triggered by exercise, cold air, and strong smells like perfumes, gas, and diesel smells.  The cough occurs constantly throughout the day, and she feels that she has very poor control of it.  The cough often develops into coughing fits that last for 5 to 10 minutes.  During these coughing fits, she often experiences difficulties inhaling, which are associated with inspiratory stridor.  She does also have lung disease, for which she is followed by pulmonary medicine.  Some of her medications (prednisone and albuterol) seem to help with this, but did not fully alleviate her symptoms.    Regarding voice, the patient reports that after coughing fits, her voice will get \"stuck\" for a couple of minutes ( clarified that this seemed to imply her voice was totally gone for a few minutes), after which time, it returns and is slightly raspy for another several minutes before returning completely to " normal.  She does not otherwise experience voice difficulties.    The patient denies swallowing difficulties generally, although she does feel as though when she is having an increase in coughing, it makes it challenging to eat solid foods.    She denies symptoms of reflux.    OBJECTIVE FINDINGS  PATIENT REPORTED MEASURES  Patient Supplied Answers To [facility] Intake Voice Questionnaire  No flowsheet data found.     Patient Supplied Answers To VHI Questionnaire  No flowsheet data found.     Patient Supplied Answers To CSI Questionnaire  No flowsheet data found.     Patient Supplied Answers To EAT Questionnaire  No flowsheet data found.        Self-Ratings as discussed with patient:  No flowsheet data found.     PERCEPTUAL EVALUATION (33278)  VOICE/ SPEECH/ NON-COMMUNICATIVE LARYNGEAL BEHAVIORS EVALUATION    Palpation of the laryngeal area shows:    supple musculature    Breathing pattern:     inspirations are inadequate in volume and frequency    Cough/ Throat clear:    cough is primary and non productive     Kulwant states today is a typical voice day, with clinician observing voice quality characterized by:    Roughness: Mild Intermittent    Breathiness: Mild Consistent    Strain: Mild Consistent    Habitual pitch is perceptually within normal limits and appropriate for age and gender    Loudness is WNL and is appropriate for the setting    GRADE, ROUGHNESS, BREATHINESS, ASTHENIA, STRAIN  (GRBAS) scale:  G(1)R(1)B(1)A(0)S(1)    LARYNGEAL EXAMINATION  Procedure: Flexible endoscopy with chip-tip technology with stroboscopy, right nostril; topical anesthesia with 3% Lidocaine and 0.25% phenylephrine was applied.   Performed by: Dr. Elizabeth Brice  Verbal consent was obtained and witnessed prior to this procedure.   A time-out was performed, verifying patient, procedure, and site.   This exam shows:    Velar Function: Not assessed    Secretions: Within normal limits    Laryngeal Mucosa: essentially healthy laryngeal  "mucosa    Vocal fold mucosa:    o RTVF - within normal limits, no visible lesions  o LTVF - within normal limits, no visible lesions    Vocal fold function:   o Vocal folds are mobile and meet at midline  o Movement is brisk and symmetric  o Exam is neurologically normal     Airway is adequate  o When asked to reproduce breathing symptoms, the patient demonstrated abduction of the glottis on inhalation  o She was able to entirely eliminate this with pursed lip inhalation    Elongation of the vocal folds for pitch increase is normal    Fatigue is evident during a task of 20 quickly repeated vowels; poor coordination between breath flow and phonation    Moderate to severe anterior-posterior constriction of the supraglottic larynx during connected speech     The addition of stroboscopy provided the following information:   o Vibratory Behavior: Present bilaterally  o Amplitude Right: Within normal limits  o Amplitude Left: Within normal limits  o Mucosal Wave Right: Within normal limits  o Mucosal Wave Left: Within normal limits  o Glottic closure:  on phonation glottic closure is normal  and With a substantial open phase predominance  o Symmetry: Mostly symmetric  o Periodicity: Mostly periodic    THERAPEUTIC ACTIVITIES (CPT 96880)  The patient learned:    Techniques for oral configurations to use during inspiration, to provide better abduction and arrest inhalatory stridor; these included: inhaling through rounded lips; inhaling through the nose with closed lips; and short, repeated sniffs followed by exhalation with puffed cheeks, and exhalation\" SH\"    Cough suppression strategies  o Identification of precursor sensation  - Tickle in throat  o Alternative strategies  - Hard swallow  - Sips of very hot and very cold liquid  - Slow nasal inhalation followed by exhalation\" SH\"  - Pursed lip inhalation followed by exhalation with puffed cheeks  o With moderate cueing, the patient was able to implement the strategies " and thereby reduce frequency and duration of coughing for 20 minutes.    ASSESSMENT / PLAN  IMPRESSIONS: Kulwant Amin is a 52 year old female with Dyspnea On Exertion (R06.09) and Chronic Cough (R05.3).  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    A course of speech therapy is recommended to help reduce chronic cough and learned optimal laryngeal mechanics to stop paradoxical vocal fold motion disorder.    She demonstrates a Excellent prognosis for improvement given adherence to therapeutic recommendations.     Positive indicators: positive response to therapy probes    Negative indicators: None    DURATION / FREQUENCY: 2 bi-weekly therapy sessions    Goals:  Patient goal:    To understand the problem and fix it as much as possible     Short-term goal(s): Within the first 4 sessions, Kulwant will:  -- demonstrate knowledge and understanding of irritable larynx patterns and symptoms in order to improve functional therapy outcomes with 90% accy.  -- be able to independently list key factors in maintenance of good vocal hygiene with 80% accuracy, and report on their use outside the therapy room.  -- demonstrate improved awareness of throat clearing/cough: acknowledging >75% of all cough events during session time with no clinician support.  -- demonstrate provided cough and throat clear suppression/substitution strategies from memory independently with 90% accuracy.  -- demonstrate silent inhalation and abdominal breathing pattern in order to optimize breathing mechanics with 90% accy and min cues.    Long-term goal(s): In 3 months, Kulwant will:  -- report a 90% resolution of Coughing and breathing symptoms during a week of performing typical personal, social, and professional activities.    This treatment plan was developed with the patient who agreed with the recommendations.    TOTAL SERVICE TIME: 60 minutes  EVALUATION OF VOICE  AND RESONANCE (56771)  TREATMENT (89128)  NO CHARGE FACILITY FEE (21061)    Speech recognition software may have been used in this documentation; input is reviewed before signature to the best of my ability.         Again, thank you for allowing me to participate in the care of your patient.      Sincerely,    Kunal Mendoza, SLP

## 2022-04-25 NOTE — TELEPHONE ENCOUNTER
FUTURE VISIT INFORMATION      FUTURE VISIT INFORMATION:    Date: 4/25/22    Time: 2:30PM    [place] [facility] [place]:    Referring provider:  Tamra Duong MD    Referring providers clinic:  [facility]monary     Reason for visit/diagnosis  Ref: TAMRA DUONG.Dx: Wheezing,Severe persistent asthma.possible PVFM Please use iPad/Phones to reach  at [phone] option 0    RECORDS REQUESTED FROM:       Clinic name Comments Records Status Imaging Status   [facility] [place] [facility]  4/14/22 note from Tamra Duong MD [facility]    Imaging 3/10/22 XR Chest   12/15/21 CT Chest Pulmonary   8/27/21 CT Head   5/27/21 MR Brain and CTA Head NEck   8/31/2020 XR Esophagram  Epic PACS   Procedures 4/17/17 PFT  [facility] [place]  3/17/22 note from Dr Elizabeth Marie Epic

## 2022-04-30 DIAGNOSIS — Z76.0 ENCOUNTER FOR MEDICATION REFILL: Primary | ICD-10-CM

## 2022-05-02 ENCOUNTER — TELEPHONE (OUTPATIENT)
Dept: SLEEP MEDICINE | Facility: CLINIC | Age: 52
End: 2022-05-02
Payer: COMMERCIAL

## 2022-05-02 DIAGNOSIS — G47.33 OSA (OBSTRUCTIVE SLEEP APNEA): Primary | ICD-10-CM

## 2022-05-02 NOTE — TELEPHONE ENCOUNTER
Called patient's daughter in law through a [facility]  to inform her of CPAP shortage and daughter in law wants patient to be put on wait list.

## 2022-05-03 ENCOUNTER — NURSE TRIAGE (OUTPATIENT)
Dept: CARDIOLOGY | Facility: CLINIC | Age: 52
End: 2022-05-03

## 2022-05-03 RX ORDER — AMITRIPTYLINE HYDROCHLORIDE 10 MG/1
TABLET ORAL
Qty: 180 TABLET | Refills: 3 | Status: SHIPPED | OUTPATIENT
Start: 2022-05-03 | End: 2022-08-25

## 2022-05-03 NOTE — TELEPHONE ENCOUNTER
"Received a call from the call center to discuss chest pain.  Spoke to daughter in law Billy who originally is asking for clopidogrel because Kulwant has been having chest pain for the last 3 days. She says it was discontinued and wanted it re-ordered.  She says he has chest pain at rest, rating it #4 in the middle chest and radiating to the left arm. She says he had an episode of vomiting yesterday. She denies any sweating. He has COPD so always has some shortness of breath. She says he is currently having chest pain.  She says he does not check his blood pressure at home.  Advised it is recommended to go to ED for an evaluation. She verbalized agreement and understanding.  Will send a message to [place] [facility] for follow up.  1. LOCATION: \"Where does it hurt?\" Middle chest  2. RADIATION: \"Does the pain go anywhere else?\" (e.g., into neck, jaw, arms, back) left arm  3. ONSET: \"When did the chest pain begin?\" (Minutes, hours or days) 3 days ago  4. PATTERN \"Does the pain come and go, or has it been constant since it started?\" \"Does it get worse with exertion?\"   5. DURATION: \"How long does it last\" (e.g., seconds, minutes, hours)  6. SEVERITY: \"How bad is the pain?\" (e.g., Scale 1-10; mild, moderate, or severe) #4, moderate  - MILD (1-3): doesn't interfere with normal activities   - MODERATE (4-7): interferes with normal activities or awakens from sleep  - SEVERE (8-10): excruciating pain, unable to do any normal activities   7. CARDIAC RISK FACTORS: \"Do you have any history of heart problems or risk factors for heart disease?\" (e.g., angina, prior heart attack; diabetes, high blood pressure, high cholesterol, smoker, or strong family history of heart disease) Has cardiac stents, HTN  8. PULMONARY RISK FACTORS: \"Do you have any history of lung disease?\" (e.g., blood clots in lung, asthma, emphysema, birth control pills) COPD  9. CAUSE: \"What do you think is causing the chest pain?\"  10. OTHER SYMPTOMS: \"Do you " "have any other symptoms?\" (e.g., dizziness, nausea, vomiting, sweating, fever, difficulty breathing, cough) vomiting, breathing difficulty      Reason for Disposition    Pain also in shoulder(s) or arm(s) or jaw (Exception: pain is clearly made worse by movement)    [1] Chest pain lasts > 5 minutes AND [2] occurred in past 3 days (72 hours) (Exception: feels exactly the same as previously diagnosed heartburn and has accompanying sour taste in mouth)    Protocols used: CHEST PAIN-A-AH      "

## 2022-05-04 ENCOUNTER — APPOINTMENT (OUTPATIENT)
Dept: CT IMAGING | Facility: HOSPITAL | Age: 52
End: 2022-05-04
Attending: EMERGENCY MEDICINE
Payer: COMMERCIAL

## 2022-05-04 ENCOUNTER — DOCUMENTATION ONLY (OUTPATIENT)
Dept: SLEEP MEDICINE | Facility: CLINIC | Age: 52
End: 2022-05-04

## 2022-05-04 ENCOUNTER — HOSPITAL ENCOUNTER (OUTPATIENT)
Facility: HOSPITAL | Age: 52
Setting detail: OBSERVATION
Discharge: HOME | End: 2022-05-08
Attending: EMERGENCY MEDICINE | Admitting: INTERNAL MEDICINE
Payer: COMMERCIAL

## 2022-05-04 DIAGNOSIS — R07.9 CHEST PAIN, UNSPECIFIED TYPE: ICD-10-CM

## 2022-05-04 DIAGNOSIS — R52 PAIN: Primary | ICD-10-CM

## 2022-05-04 LAB
ALBUMIN SERPL-MCNC: 3.5 G/DL (ref 3.5–5)
ALP SERPL-CCNC: 94 U/L (ref 45–120)
ALT SERPL W P-5'-P-CCNC: 15 U/L (ref 0–45)
ANION GAP SERPL CALCULATED.3IONS-SCNC: 11 MMOL/L (ref 5–18)
AST SERPL W P-5'-P-CCNC: 18 U/L (ref 0–40)
BILIRUB DIRECT SERPL-MCNC: 0.1 MG/DL
BILIRUB SERPL-MCNC: 0.4 MG/DL (ref 0–1)
BUN SERPL-MCNC: 7 MG/DL (ref 8–22)
CALCIUM SERPL-MCNC: 9.3 MG/DL (ref 8.5–10.5)
CHLORIDE BLD-SCNC: 106 MMOL/L (ref 98–107)
CO2 SERPL-SCNC: 22 MMOL/L (ref 22–31)
CREAT SERPL-MCNC: 0.65 MG/DL (ref 0.6–1.1)
ERYTHROCYTE [DISTWIDTH] IN BLOOD BY AUTOMATED COUNT: 13.8 % (ref 10–15)
FLUAV RNA SPEC QL NAA+PROBE: NEGATIVE
FLUBV RNA RESP QL NAA+PROBE: NEGATIVE
GFR SERPL CREATININE-BSD FRML MDRD: >90 ML/MIN/1.73M2
GLUCOSE BLD-MCNC: 192 MG/DL (ref 70–125)
GLUCOSE BLDC GLUCOMTR-MCNC: 227 MG/DL (ref 70–99)
HCT VFR BLD AUTO: 40.9 % (ref 35–47)
HGB BLD-MCNC: 12.7 G/DL (ref 11.7–15.7)
HOLD SPECIMEN: NORMAL
LIPASE SERPL-CCNC: 31 U/L (ref 0–52)
MCH RBC QN AUTO: 26.1 PG (ref 26.5–33)
MCHC RBC AUTO-ENTMCNC: 31.1 G/DL (ref 31.5–36.5)
MCV RBC AUTO: 84 FL (ref 78–100)
PLATELET # BLD AUTO: 217 10E3/UL (ref 150–450)
POTASSIUM BLD-SCNC: 3.8 MMOL/L (ref 3.5–5)
PROT SERPL-MCNC: 6.9 G/DL (ref 6–8)
RBC # BLD AUTO: 4.87 10E6/UL (ref 3.8–5.2)
SARS-COV-2 RNA RESP QL NAA+PROBE: NEGATIVE
SODIUM SERPL-SCNC: 139 MMOL/L (ref 136–145)
TROPONIN I SERPL-MCNC: <0.01 NG/ML (ref 0–0.29)
WBC # BLD AUTO: 7.6 10E3/UL (ref 4–11)

## 2022-05-04 PROCEDURE — 84484 ASSAY OF TROPONIN QUANT: CPT | Performed by: EMERGENCY MEDICINE

## 2022-05-04 PROCEDURE — 82310 ASSAY OF CALCIUM: CPT | Performed by: EMERGENCY MEDICINE

## 2022-05-04 PROCEDURE — 96372 THER/PROPH/DIAG INJ SC/IM: CPT | Performed by: INTERNAL MEDICINE

## 2022-05-04 PROCEDURE — 74177 CT ABD & PELVIS W/CONTRAST: CPT

## 2022-05-04 PROCEDURE — 36415 COLL VENOUS BLD VENIPUNCTURE: CPT | Performed by: EMERGENCY MEDICINE

## 2022-05-04 PROCEDURE — 87636 SARSCOV2 & INF A&B AMP PRB: CPT | Performed by: EMERGENCY MEDICINE

## 2022-05-04 PROCEDURE — 71275 CT ANGIOGRAPHY CHEST: CPT

## 2022-05-04 PROCEDURE — 250N000011 HC RX IP 250 OP 636: Performed by: EMERGENCY MEDICINE

## 2022-05-04 PROCEDURE — 93005 ELECTROCARDIOGRAM TRACING: CPT | Performed by: EMERGENCY MEDICINE

## 2022-05-04 PROCEDURE — 250N000013 HC RX MED GY IP 250 OP 250 PS 637: Performed by: INTERNAL MEDICINE

## 2022-05-04 PROCEDURE — C9803 HOPD COVID-19 SPEC COLLECT: HCPCS

## 2022-05-04 PROCEDURE — 99220 PR INITIAL OBSERVATION CARE,LEVEL III: CPT | Performed by: INTERNAL MEDICINE

## 2022-05-04 PROCEDURE — 99285 EMERGENCY DEPT VISIT HI MDM: CPT | Mod: 25

## 2022-05-04 PROCEDURE — 83690 ASSAY OF LIPASE: CPT | Performed by: EMERGENCY MEDICINE

## 2022-05-04 PROCEDURE — 82248 BILIRUBIN DIRECT: CPT | Performed by: EMERGENCY MEDICINE

## 2022-05-04 PROCEDURE — 250N000009 HC RX 250: Performed by: INTERNAL MEDICINE

## 2022-05-04 PROCEDURE — 250N000009 HC RX 250: Performed by: EMERGENCY MEDICINE

## 2022-05-04 PROCEDURE — 250N000012 HC RX MED GY IP 250 OP 636 PS 637: Performed by: INTERNAL MEDICINE

## 2022-05-04 PROCEDURE — 250N000013 HC RX MED GY IP 250 OP 250 PS 637: Performed by: EMERGENCY MEDICINE

## 2022-05-04 PROCEDURE — 82962 GLUCOSE BLOOD TEST: CPT

## 2022-05-04 PROCEDURE — G0378 HOSPITAL OBSERVATION PER HR: HCPCS

## 2022-05-04 PROCEDURE — 96374 THER/PROPH/DIAG INJ IV PUSH: CPT | Mod: 59

## 2022-05-04 PROCEDURE — 85027 COMPLETE CBC AUTOMATED: CPT | Performed by: EMERGENCY MEDICINE

## 2022-05-04 PROCEDURE — 96375 TX/PRO/DX INJ NEW DRUG ADDON: CPT | Mod: XU

## 2022-05-04 RX ORDER — CYCLOBENZAPRINE HCL 5 MG
5-10 TABLET ORAL 3 TIMES DAILY PRN
Status: DISCONTINUED | OUTPATIENT
Start: 2022-05-04 | End: 2022-05-08 | Stop reason: HOSPADM

## 2022-05-04 RX ORDER — ACETAMINOPHEN 650 MG/1
650 SUPPOSITORY RECTAL EVERY 6 HOURS PRN
Status: DISCONTINUED | OUTPATIENT
Start: 2022-05-04 | End: 2022-05-06

## 2022-05-04 RX ORDER — CALCIUM CARBONATE 500 MG/1
1000 TABLET, CHEWABLE ORAL 4 TIMES DAILY PRN
Status: DISCONTINUED | OUTPATIENT
Start: 2022-05-04 | End: 2022-05-08 | Stop reason: HOSPADM

## 2022-05-04 RX ORDER — ASPIRIN 81 MG/1
81 TABLET ORAL DAILY
Status: DISCONTINUED | OUTPATIENT
Start: 2022-05-04 | End: 2022-05-08 | Stop reason: HOSPADM

## 2022-05-04 RX ORDER — NITROGLYCERIN 0.4 MG/1
0.4 TABLET SUBLINGUAL EVERY 5 MIN PRN
Status: DISCONTINUED | OUTPATIENT
Start: 2022-05-04 | End: 2022-05-08 | Stop reason: HOSPADM

## 2022-05-04 RX ORDER — ACETAMINOPHEN 325 MG/1
650 TABLET ORAL EVERY 6 HOURS PRN
Status: DISCONTINUED | OUTPATIENT
Start: 2022-05-04 | End: 2022-05-06

## 2022-05-04 RX ORDER — GABAPENTIN 300 MG/1
600 CAPSULE ORAL 3 TIMES DAILY
Status: DISCONTINUED | OUTPATIENT
Start: 2022-05-04 | End: 2022-05-08 | Stop reason: HOSPADM

## 2022-05-04 RX ORDER — ONDANSETRON 4 MG/1
4 TABLET, ORALLY DISINTEGRATING ORAL EVERY 6 HOURS PRN
Status: DISCONTINUED | OUTPATIENT
Start: 2022-05-04 | End: 2022-05-08 | Stop reason: HOSPADM

## 2022-05-04 RX ORDER — FAMOTIDINE 20 MG/1
20 TABLET, FILM COATED ORAL 2 TIMES DAILY
Status: DISCONTINUED | OUTPATIENT
Start: 2022-05-04 | End: 2022-05-08 | Stop reason: HOSPADM

## 2022-05-04 RX ORDER — NICOTINE POLACRILEX 4 MG
15-30 LOZENGE BUCCAL
Status: DISCONTINUED | OUTPATIENT
Start: 2022-05-04 | End: 2022-05-08 | Stop reason: HOSPADM

## 2022-05-04 RX ORDER — ONDANSETRON 2 MG/ML
4 INJECTION INTRAMUSCULAR; INTRAVENOUS EVERY 6 HOURS PRN
Status: DISCONTINUED | OUTPATIENT
Start: 2022-05-04 | End: 2022-05-08 | Stop reason: HOSPADM

## 2022-05-04 RX ORDER — LANOLIN ALCOHOL/MO/W.PET/CERES
3 CREAM (GRAM) TOPICAL
Status: DISCONTINUED | OUTPATIENT
Start: 2022-05-04 | End: 2022-05-08 | Stop reason: HOSPADM

## 2022-05-04 RX ORDER — DEXTROSE MONOHYDRATE 25 G/50ML
25-50 INJECTION, SOLUTION INTRAVENOUS
Status: DISCONTINUED | OUTPATIENT
Start: 2022-05-04 | End: 2022-05-08 | Stop reason: HOSPADM

## 2022-05-04 RX ORDER — SUCRALFATE 1 G/1
1 TABLET ORAL
Status: DISCONTINUED | OUTPATIENT
Start: 2022-05-04 | End: 2022-05-08 | Stop reason: HOSPADM

## 2022-05-04 RX ORDER — LORATADINE 10 MG/1
10 TABLET ORAL DAILY
COMMUNITY
End: 2022-05-31

## 2022-05-04 RX ORDER — KETOROLAC TROMETHAMINE 30 MG/ML
15 INJECTION, SOLUTION INTRAMUSCULAR; INTRAVENOUS ONCE
Status: COMPLETED | OUTPATIENT
Start: 2022-05-04 | End: 2022-05-04

## 2022-05-04 RX ORDER — MONTELUKAST SODIUM 10 MG/1
10 TABLET ORAL AT BEDTIME
Status: DISCONTINUED | OUTPATIENT
Start: 2022-05-04 | End: 2022-05-08 | Stop reason: HOSPADM

## 2022-05-04 RX ORDER — PROCHLORPERAZINE 25 MG
25 SUPPOSITORY, RECTAL RECTAL EVERY 12 HOURS PRN
Status: DISCONTINUED | OUTPATIENT
Start: 2022-05-04 | End: 2022-05-08 | Stop reason: HOSPADM

## 2022-05-04 RX ORDER — ONDANSETRON 2 MG/ML
4 INJECTION INTRAMUSCULAR; INTRAVENOUS ONCE
Status: COMPLETED | OUTPATIENT
Start: 2022-05-04 | End: 2022-05-04

## 2022-05-04 RX ORDER — PROCHLORPERAZINE MALEATE 10 MG
10 TABLET ORAL EVERY 6 HOURS PRN
Status: DISCONTINUED | OUTPATIENT
Start: 2022-05-04 | End: 2022-05-08 | Stop reason: HOSPADM

## 2022-05-04 RX ORDER — ATORVASTATIN CALCIUM 40 MG/1
80 TABLET, FILM COATED ORAL EVERY EVENING
Status: DISCONTINUED | OUTPATIENT
Start: 2022-05-04 | End: 2022-05-08 | Stop reason: HOSPADM

## 2022-05-04 RX ORDER — IOPAMIDOL 755 MG/ML
100 INJECTION, SOLUTION INTRAVASCULAR ONCE
Status: COMPLETED | OUTPATIENT
Start: 2022-05-04 | End: 2022-05-04

## 2022-05-04 RX ORDER — HYDROCHLOROTHIAZIDE 12.5 MG/1
12.5 TABLET ORAL DAILY
Status: DISCONTINUED | OUTPATIENT
Start: 2022-05-04 | End: 2022-05-08 | Stop reason: HOSPADM

## 2022-05-04 RX ORDER — ALBUTEROL SULFATE 0.83 MG/ML
2.5 SOLUTION RESPIRATORY (INHALATION) EVERY 6 HOURS PRN
Status: DISCONTINUED | OUTPATIENT
Start: 2022-05-04 | End: 2022-05-08 | Stop reason: HOSPADM

## 2022-05-04 RX ORDER — METOPROLOL TARTRATE 25 MG/1
25 TABLET, FILM COATED ORAL 2 TIMES DAILY
Status: DISCONTINUED | OUTPATIENT
Start: 2022-05-04 | End: 2022-05-08 | Stop reason: HOSPADM

## 2022-05-04 RX ORDER — ASPIRIN 81 MG/1
81 TABLET, CHEWABLE ORAL ONCE
Status: COMPLETED | OUTPATIENT
Start: 2022-05-04 | End: 2022-05-04

## 2022-05-04 RX ORDER — AMITRIPTYLINE HYDROCHLORIDE 10 MG/1
20 TABLET ORAL AT BEDTIME
Status: DISCONTINUED | OUTPATIENT
Start: 2022-05-04 | End: 2022-05-08 | Stop reason: HOSPADM

## 2022-05-04 RX ORDER — PANTOPRAZOLE SODIUM 40 MG/1
40 TABLET, DELAYED RELEASE ORAL DAILY
Status: DISCONTINUED | OUTPATIENT
Start: 2022-05-04 | End: 2022-05-08 | Stop reason: HOSPADM

## 2022-05-04 RX ADMIN — ASPIRIN 81 MG: 81 TABLET, COATED ORAL at 22:12

## 2022-05-04 RX ADMIN — ONDANSETRON 4 MG: 2 INJECTION INTRAMUSCULAR; INTRAVENOUS at 10:05

## 2022-05-04 RX ADMIN — UMECLIDINIUM 1 PUFF: 62.5 AEROSOL, POWDER ORAL at 20:17

## 2022-05-04 RX ADMIN — INSULIN ASPART 1 UNITS: 100 INJECTION, SOLUTION INTRAVENOUS; SUBCUTANEOUS at 22:13

## 2022-05-04 RX ADMIN — ATORVASTATIN CALCIUM 80 MG: 40 TABLET, FILM COATED ORAL at 22:10

## 2022-05-04 RX ADMIN — PANTOPRAZOLE SODIUM 40 MG: 40 TABLET, DELAYED RELEASE ORAL at 20:12

## 2022-05-04 RX ADMIN — INSULIN GLARGINE 24 UNITS: 100 INJECTION, SOLUTION SUBCUTANEOUS at 22:15

## 2022-05-04 RX ADMIN — ACETAMINOPHEN 650 MG: 325 TABLET ORAL at 22:09

## 2022-05-04 RX ADMIN — ASPIRIN 81 MG CHEWABLE TABLET 81 MG: 81 TABLET CHEWABLE at 10:04

## 2022-05-04 RX ADMIN — FAMOTIDINE 20 MG: 20 TABLET ORAL at 22:09

## 2022-05-04 RX ADMIN — LIDOCAINE HYDROCHLORIDE 30 ML: 20 SOLUTION ORAL; TOPICAL at 20:14

## 2022-05-04 RX ADMIN — SUCRALFATE 1 G: 1 TABLET ORAL at 22:08

## 2022-05-04 RX ADMIN — METOPROLOL TARTRATE 25 MG: 25 TABLET, FILM COATED ORAL at 20:12

## 2022-05-04 RX ADMIN — HYDROCHLOROTHIAZIDE 12.5 MG: 12.5 TABLET ORAL at 20:13

## 2022-05-04 RX ADMIN — IOPAMIDOL 100 ML: 755 INJECTION, SOLUTION INTRAVENOUS at 10:35

## 2022-05-04 RX ADMIN — KETOROLAC TROMETHAMINE 15 MG: 30 INJECTION, SOLUTION INTRAMUSCULAR at 16:39

## 2022-05-04 RX ADMIN — NITROGLYCERIN 0.4 MG: 0.4 TABLET, ORALLY DISINTEGRATING SUBLINGUAL at 11:35

## 2022-05-04 RX ADMIN — AMITRIPTYLINE HYDROCHLORIDE 20 MG: 10 TABLET, FILM COATED ORAL at 22:10

## 2022-05-04 RX ADMIN — NITROGLYCERIN 7.5 MG: 20 OINTMENT TOPICAL at 13:26

## 2022-05-04 RX ADMIN — GABAPENTIN 600 MG: 300 CAPSULE ORAL at 20:11

## 2022-05-04 RX ADMIN — LIDOCAINE HYDROCHLORIDE 30 ML: 20 SOLUTION ORAL; TOPICAL at 10:06

## 2022-05-04 RX ADMIN — MONTELUKAST 10 MG: 10 TABLET, FILM COATED ORAL at 22:11

## 2022-05-04 ASSESSMENT — ENCOUNTER SYMPTOMS
DYSURIA: 0
COUGH: 0
SHORTNESS OF BREATH: 1
DIARRHEA: 0
ABDOMINAL PAIN: 0
VOMITING: 1
DIAPHORESIS: 1
NAUSEA: 1
TROUBLE SWALLOWING: 0

## 2022-05-04 ASSESSMENT — ABCD2 SCORE
BP IS HIGHER THAN 140/90 MMHG: NO
AGE IS GREATER THAN OR EQUAL TO 60: NO

## 2022-05-04 ASSESSMENT — CURB65 SCORE
RESPIRATORY RATE GREATER THAN OR EQUAL TO 30: NO
SBP LESS THAN 90 OR DBNP LESS THAN 60: NO
AGE IS GREATER THAN OR EQUAL TO 65: NO

## 2022-05-04 NOTE — ED NOTES
Ambulated patient to bathroom, patient notably short of breath and would pause when she was walking to catch her breath. Generalized weakness was also noted. Patient ambulated with stand by assist and using cane for support.

## 2022-05-04 NOTE — ED NOTES
Pt came to ER for unrelieved chest pain x 4days. Pain was intermittent, worsens w/ exertion. She has tried tylenol and aspirin at home w/ minimal relief. She also has hx of COPD for 9years, no home O2 only prn inhaler, also hx of MI/stent placed in 2018. Patient appears calm and pleasant. Daughter at bedside. Patient c/o dull chest pain 6/10 and slight dizziness; currently on nitropatch (since 1326 today). RN will inform and discuss pain relief w/ provider.

## 2022-05-04 NOTE — ED PROVIDER NOTES
eMERGENCY dEPARTMENT PROGRESS NOTE        ED COURSE AND MEDICAL DECISION MAKING  Patient was signed out to me by Dr. Moncada at change of shift (3:20 PM)      Kulwant Amin is a 52 year old female who presents with 4 days of moderate, substernal, pressure-like chest pain radiating to her left arm. The pain is worth with activity and she reports some dyspnea.     The pain was improved with nitroglycerin in the ED. EKG and troponin negative.    Patient will likely require admission for cardiac rule out.    7:00 PM I spoke with the hospitalist, Dr. Seals. We discussed the patient's case, and they agree to admit the patient.         LAB  Pertinent labs results reviewed   Results for orders placed or performed during the hospital encounter of 05/04/22   CT Chest Pulmonary Embolism w Contrast    Impression    IMPRESSION:  1.  No pulmonary artery embolism or thoracic aortic dissection.  2.  No pneumonic consolidation or pleural effusion.   CT Abdomen Pelvis w Contrast    Impression    IMPRESSION:   1.  No abnormalities are seen to explain vomiting. Specifically, no bowel obstruction or inflammatory changes.   CBC (+ platelets, no diff)   Result Value Ref Range    WBC Count 7.6 4.0 - 11.0 10e3/uL    RBC Count 4.87 3.80 - 5.20 10e6/uL    Hemoglobin 12.7 11.7 - 15.7 g/dL    Hematocrit 40.9 35.0 - 47.0 %    MCV 84 78 - 100 fL    MCH 26.1 (L) 26.5 - 33.0 pg    MCHC 31.1 (L) 31.5 - 36.5 g/dL    RDW 13.8 10.0 - 15.0 %    Platelet Count 217 150 - 450 10e3/uL   Basic metabolic panel   Result Value Ref Range    Sodium 139 136 - 145 mmol/L    Potassium 3.8 3.5 - 5.0 mmol/L    Chloride 106 98 - 107 mmol/L    Carbon Dioxide (CO2) 22 22 - 31 mmol/L    Anion Gap 11 5 - 18 mmol/L    Urea Nitrogen 7 (L) 8 - 22 mg/dL    Creatinine 0.65 0.60 - 1.10 mg/dL    Calcium 9.3 8.5 - 10.5 mg/dL    Glucose 192 (H) 70 - 125 mg/dL    GFR Estimate >90 >60 mL/min/1.73m2   Troponin I (now)   Result Value Ref Range    Troponin I <0.01 0.00 - 0.29 ng/mL    Hepatic function panel   Result Value Ref Range    Bilirubin Total 0.4 0.0 - 1.0 mg/dL    Bilirubin Direct 0.1 <=0.5 mg/dL    Protein Total 6.9 6.0 - 8.0 g/dL    Albumin 3.5 3.5 - 5.0 g/dL    Alkaline Phosphatase 94 45 - 120 U/L    AST 18 0 - 40 U/L    ALT 15 0 - 45 U/L   Result Value Ref Range    Lipase 31 0 - 52 U/L   Extra Red Top Tube   Result Value Ref Range    Hold Specimen JIC    Extra Green Top (Lithium Heparin) Tube   Result Value Ref Range    Hold Specimen JIC    Extra Purple Top Tube   Result Value Ref Range    Hold Specimen JIC    Symptomatic; Unknown Influenza A/B & SARS-CoV2 (COVID-19) Virus PCR Multiplex Nasopharyngeal    Specimen: Nasopharyngeal; Swab   Result Value Ref Range    Influenza A PCR Negative Negative    Influenza B PCR Negative Negative    SARS CoV2 PCR Negative Negative   Troponin I (now)   Result Value Ref Range    Troponin I <0.01 0.00 - 0.29 ng/mL         RADIOLOGY    Pertinent imaging reviewed   Please see official radiology report.  CT Abdomen Pelvis w Contrast   Final Result   IMPRESSION:    1.  No abnormalities are seen to explain vomiting. Specifically, no bowel obstruction or inflammatory changes.      CT Chest Pulmonary Embolism w Contrast   Final Result   IMPRESSION:   1.  No pulmonary artery embolism or thoracic aortic dissection.   2.  No pneumonic consolidation or pleural effusion.          EKG   Performed at: 16:37  HR:  105 bpm  Rhythm: Sinus  Axis: 250  QRS duration: 88 ms  QTC: 499 ms  ST changes: No ST segment elevation or depression, no T wave inversion,No Q wave  Interpretation: Sinus Tachycardia.  Compared to most recent ECG from: 08:47, no ischemic changes, heart rate has increased by 14 bpm.    I have reviewed the patient's ECG, with comments made as listed above. Please see scanned image for full interpretation.       FINAL IMPRESSION    Chest Pain          Dinesh Garland,   05/07/22 0131

## 2022-05-04 NOTE — CONSULTS
Care Management Initial Consult    General Information  Assessment completed with: Patient, Other (daughter in law Billy), pt and dtr in law Billy  Type of CM/SW Visit: Initial Assessment    Primary Care Provider verified and updated as needed: Yes   Readmission within the last 30 days: no previous admission in last 30 days   Return Category: Progression of disease  Reason for Consult: discharge planning  Advance Care Planning: Advance Care Planning Reviewed: verified with patient          Communication Assessment  Patient's communication style: spoken language (English or Bilingual), spoken language (non-English) (Danish w/daughter in law Billy help)             Cognitive  Cognitive/Neuro/Behavioral:                        Living Environment:   People in home: child(inge), adult, spouse     Current living Arrangements: house      Able to return to prior arrangements: yes       Family/Social Support:  Care provided by: self, child(inge), other (see comments) (PCA)  Provides care for: no one  Marital Status:   , Children, PCA          Description of Support System: Involved, Supportive    Support Assessment: Adequate family and caregiver support, Adequate social supports    Current Resources:   Patient receiving home care services: No     Community Resources: DME, PCA  Equipment currently used at home: walker, standard  Supplies currently used at home:      Employment/Financial:  Employment Status:          Financial Concerns: No concerns identified   Referral to Financial Worker: No       Lifestyle & Psychosocial Needs:  Social Determinants of Health     Tobacco Use: Medium Risk     Smoking Tobacco Use: Former Smoker     Smokeless Tobacco Use: Never Used   Alcohol Use: Not on file   Financial Resource Strain: Not on file   Food Insecurity: Not on file   Transportation Needs: Not on file   Physical Activity: Not on file   Stress: Not on file   Social Connections: Not on file   Intimate Partner Violence:  Not on file   Depression: Not at risk     PHQ-2 Score: 0   Housing Stability: Not on file       Functional Status:  Prior to admission patient needed assistance:         Assesssment of Functional Status: Not at baseline with ADL Functioning, Not at baseline with mobility    Mental Health Status:  Mental Health Status: No Current Concerns       Chemical Dependency Status:                Values/Beliefs:  Spiritual, Cultural Beliefs, Congregation Practices, Values that affect care:                 Additional Information:  NICKY assessed, spoke to pt and daughter in law Billy, she helped w/providing information, pt has PCA svcs, Billy [phone] is her PCA, she will be  for discharge planning.      Aundrea Newell RN

## 2022-05-04 NOTE — ED TRIAGE NOTES
Patient having chest pain for last 3-4 days, feels slightly short of breath, dizzy, nauseated. Vomited x 1 yesterday. Patient has history of COPD, stroke a year ago and 2 stents placed in 2018.     Triage Assessment     Row Name 05/04/22 0835       Cardiac WDL    Cardiac WDL chest pain       Chest Pain Assessment    Chest Pain Location substernal    Chest Pain Radiation arm    Duration 3-4 days

## 2022-05-04 NOTE — PHARMACY-ADMISSION MEDICATION HISTORY
Pharmacy Note - Admission Medication History    Pertinent Provider Information: none     ______________________________________________________________________    Prior To Admission (PTA) med list completed and updated in EMR.       PTA Med List   Medication Sig Last Dose     acetaminophen (TYLENOL) 500 MG tablet Take 2 tablets (1,000 mg) by mouth every 8 hours 5/3/2022 at Unknown time     albuterol (PROAIR HFA/PROVENTIL HFA/VENTOLIN HFA) 108 (90 Base) MCG/ACT inhaler Inhale 2 puffs into the lungs every 4 hours as needed for shortness of breath / dyspnea Unknown at Unknown time     albuterol (PROVENTIL) (2.5 MG/3ML) 0.083% neb solution Inhale 2.5 mg into the lungs every 6 hours as needed  Unknown at Unknown time     amitriptyline (ELAVIL) 10 MG tablet TAKE 2 TABLETS (20 MG TOTAL) BY MOUTH AT BEDTIME. 5/3/2022 at Unknown time     ASPIRIN LOW DOSE 81 MG EC tablet TAKE 1 TABLET (81 MG TOTAL) BY MOUTH DAILY. 5/3/2022 at Unknown time     atorvastatin (LIPITOR) 80 MG tablet TAKE 1 TABLET (80 MG TOTAL) BY MOUTH AT BEDTIME FOR CHOLESTEROL 5/3/2022 at Unknown time     cyclobenzaprine (FLEXERIL) 5 MG tablet TAKE 1-2 TABLETS (5-10 MG) BY MOUTH 3 TIMES DAILY AS NEEDED FOR MUSCLE SPASMS Unknown at Unknown time     Dupilumab (DUPIXENT) 300 MG/2ML SOPN Inject 300 mg Subcutaneous every 14 days Past Month at Unknown time     famotidine (PEPCID) 20 MG tablet Take 1 tablet (20 mg) by mouth 2 times daily 5/3/2022 at Unknown time     fluticasone-vilanterol (BREO ELLIPTA) 200-25 MCG/INH inhaler Inhale 1 puff into the lungs daily 5/3/2022 at Unknown time     gabapentin (NEURONTIN) 300 MG capsule Take 2 capsules (600 mg) by mouth 3 times daily 5/3/2022 at Unknown time     guaiFENesin (ROBITUSSIN) 100 MG/5ML liquid Take 10 mLs (200 mg) by mouth every 4 hours as needed for cough Unknown at Unknown time     hydrochlorothiazide (MICROZIDE) 12.5 MG capsule TAKE 1 CAPSULE (12.5 MG TOTAL) BY MOUTH DAILY FOR BLOOD PRESSURE 5/3/2022 at Unknown time      hydrocortisone (CORTAID) 1 % external cream Apply topically 2 times daily Unknown at Unknown time     Insulin Aspart FlexPen 100 UNIT/ML SOPN GIVE BEFORE MEALS: FOR PRE-MEAL GLUCOSE: 140-189 GIVE 1 UNIT, 190-239 GIVE 2 UNITS, 240-289 GIVE 3 UNITS, 290-339 GIVE 4 UNITS, =OR>340 GIVE 6 UNITS *84* 5/3/2022 at Unknown time     insulin glargine (LANTUS PEN) 100 UNIT/ML pen Inject 10 Units Subcutaneous every morning AND 24 Units At Bedtime. 5/3/2022 at Unknown time     ipratropium - albuterol 0.5 mg/2.5 mg/3 mL (DUONEB) 0.5-2.5 (3) MG/3ML neb solution Take 1 vial (3 mLs) by nebulization every 6 hours as needed for shortness of breath / dyspnea or wheezing Unknown at Unknown time     loratadine (CLARITIN) 10 MG tablet Take 10 mg by mouth daily 5/3/2022 at Unknown time     meclizine (ANTIVERT) 25 MG tablet Take 25 mg by mouth daily as needed  Unknown at Unknown time     metFORMIN (GLUCOPHAGE) 1000 MG tablet Take 1 tablet (1,000 mg) by mouth 2 times daily (with meals) 5/3/2022 at Unknown time     metoprolol tartrate (LOPRESSOR) 25 MG tablet TAKE 1 TABLET (25 MG TOTAL) BY MOUTH 2 (TWO) TIMES A DAY FOR BLOOD PRESSURE 5/3/2022 at Unknown time     montelukast (SINGULAIR) 10 MG tablet Take 1 tablet (10 mg) by mouth At Bedtime 5/3/2022 at Unknown time     omeprazole (PRILOSEC) 40 MG DR capsule Take 1 capsule (40 mg) by mouth daily 5/3/2022 at Unknown time     Polyethylene Glycol 3350 (PEG 3350) 17 GM/SCOOP POWD MIX 17 GRAMS WITH LIQUID AND DRINK ONCE DAILY FOR CONSTIPATION 5/3/2022 at Unknown time     polyvinyl alcohol (ARTIFICIAL TEARS) 1.4 % ophthalmic solution Place 1 drop into both eyes as needed for dry eyes Unknown at Unknown time     sucralfate (CARAFATE) 1 GM tablet TAKE 1 TABLET (1 G TOTAL) BY MOUTH 4 (FOUR) TIMES A DAY BEFORE MEALS AND AT BEDTIME. TAKE 1 HOUR PRIOR TO MEALS 5/3/2022 at Unknown time     tiotropium (SPIRIVA RESPIMAT) 2.5 MCG/ACT inhalation aerosol Inhale 2 puffs into the lungs daily 5/3/2022 at Unknown  time       Information source(s): Patient, Family member, Clinic records and [facility]  Method of interview communication: in-person    Summary of Changes to PTA Med List  New: none  Discontinued: none  Changed: none    Patient was asked about OTC/herbal products specifically.  PTA med list reflects this.    In the past week, patient estimated taking medication this percent of the time:  greater than 90%.    Allergies were reviewed, assessed, and updated with the patient.      Patient did not bring any medications to the hospital and can't retrieve from home. No multi-dose medications are available for use during hospital stay.     The information provided in this note is only as accurate as the sources available at the time of the update(s).    Thank you for the opportunity to participate in the care of this patient.    Cabrera Godfrey RPH  5/4/2022 11:09 AM     Albendazole Counseling:  I discussed with the patient the risks of albendazole including but not limited to cytopenia, kidney damage, nausea/vomiting and severe allergy.  The patient understands that this medication is being used in an off-label manner.

## 2022-05-04 NOTE — ED PROVIDER NOTES
EMERGENCY DEPARTMENT ENCOUNTER      NAME: Kulwant Amin  AGE: 52 year old female  YOB: 1970  MRN: [id number]  EVALUATION DATE & TIME: 2022  8:37 AM    PCP: Adrien Cardoza    ED PROVIDER: Christine Moncada M.D.        Chief Complaint   Patient presents with     Chest Pain         FINAL IMPRESSION:    1. Chest pain, unspecified type            MEDICAL DECISION MAKIN year old female with cardiac history who presents emergency department with chest pain.  Worse with ambulation.  Does improve with nitroglycerin.  Initial EKG and troponin unremarkable.  At this time we do not have any beds available at our hospital and we are looking for another hospital in the [facility] though no bed available yet at this point.  Patient signed out at change of shift awaiting bed assignment in a local hospital for chest pain rule out.        ED COURSE:  9:08 AM  Physician resident, Helena Lara MD staffed the patient with me. I agree with their assessment and plan of management, and I will see the patient.    9:19 AM  I met with the patient to gather history and perform my exam. ED course and treatment discussed.    11:32 AM  Patient continues to have chest pain.  Nursing has not given the ordered nitroglycerin.  We will discussed this with nursing. Charge nurse to look for a bed for admission.    12:24 PM  Still looking for a bed in [facility].    12:58 PM  Patient is feeling better after nitroglycerin.  We will try some Nitropaste.  Continue to look for bed and patient and family aware.    2:18 PM  Still no bed available in the [facility].  Repeat troponins have been ordered for every 6 hours.  Patient to be signed out at change of shift to continue to look for inpatient cardiac telemetry bed.    I do not think that this represents rib fractures, myocarditis, pericarditis, endocarditis, PE, ruptured AAA, pneumothorax, aortic dissection, bowel obstruction, bowel ischemia, cholecystitis, kidney stone, pyelonephritis, or  other such etiologies at this time.         Heart Score for Chest Pain Patients   History Highly Suspicious 2 0    Moderately Suspicious 1     Slightly Suspicious 0    EKG Significant ST depression 2 0    Nonspecific Repolarization 1     Normal 0    Age >65 years 2 1    45 - 65 years 1     <45 years 0    Risk Factors 3 or more risk factors 2 3    1-2 risk factors 1     No known risk factors 0    Troponin >3x normal 2 0    >1 - <3x normal 1     Normal limit 0    Total   4     When the Heart Score is applied:  Score 0-3 : 1.7% had a Major Adverse Cardiac Event (MACE)  Score 4-6: 16.6% MACE  Score 7-10: 50.1% MACE    *Risk factors for atherosclerotic disease:   Hypercholesterolemia, Hypertension, DM, Cigarette smoking, Family History, Obesity  * Significant ST depression defined as changes of 1mm or greater. T wave inversions and ST depression of 0.5mm considered nonspecific       COVID-19 PPE worn during patient evaluation:  Mask: n95 and homemade masks   Eye Protection: goggles   Gown: none   Hair cover: yes  Face shield: none   Patient wearing a mask: yes         At the conclusion of the encounter the results of all of the tests and the disposition were discussed. Their questions were answered. The patient (and any family present) acknowledged understanding and were agreeable with the care plan.        CONSULTANTS:  none      MEDICATIONS GIVEN IN THE EMERGENCY:  Medications   nitroGLYcerin (NITROSTAT) sublingual tablet 0.4 mg (0.4 mg Sublingual Given 5/4/22 1135)   nitroGLYcerin (NITRO-BID) 2 % ointment 7.5 mg (7.5 mg Transdermal Patch/Med Applied 5/4/22 1326)   aspirin (ASA) chewable tablet 81 mg (81 mg Oral Given 5/4/22 1004)   lidocaine (viscous) (XYLOCAINE) 2 % 15 mL, alum & mag hydroxide-simethicone (MAALOX) 15 mL GI Cocktail (30 mLs Oral Given 5/4/22 1006)   ondansetron (ZOFRAN) injection 4 mg (4 mg Intravenous Given 5/4/22 1005)   iopamidol (ISOVUE-370) solution 100 mL (100 mLs Intravenous Given 5/4/22 1035)            NEW PRESCRIPTIONS STARTED AT TODAY'S ER VISIT:     Medication List      ASK your doctor about these medications    Dupixent 300 MG/2ML Sopn  Generic drug: Dupilumab  300 mg, Subcutaneous, EVERY 14 DAYS  Ask about: Which instructions should I use?     loratadine 10 MG tablet  Commonly known as: CLARITIN  Ask about: Which instructions should I use?     metFORMIN 1000 MG tablet  Commonly known as: GLUCOPHAGE  1,000 mg, Oral, 2 TIMES DAILY WITH MEALS  Ask about: Which instructions should I use?                CONDITION:  stable        DISPOSITION:  Pending card tele bed assignment         =================================================================  =================================================================    I am seeing this patient along with Physician Resident, Dr. Helena Lara.      I, Christine Moncada MD have reviewed the documentation, personally taken the patient's history, performed an exam and agree with the physical findings, diagnosis and management plan.      HPI    Patient information was obtained from: patient and family    Use of Intrepreter: Yes (Language Line) - Language Arabic     Kulwant Amin is a 52 year old female with history of TB lung, asthma, COPD, CAD, s/p coronary artery stent placement x 2 [id number], arterial ischemic stroke, pulmonary nodule, FLACO, anemia, T2DM, hypertyroidism, GERD, anxiety, HLD, and essential HTN who presents to the ER with complaints of chest pain.    Patient reports that she has been having substernal and left sided chest pain for four days. Waxes and wanes in intensity - worse with walking. She states the pain is characterized by pressure and radiates to her left arm. She endorses associated constant shortness of breath, but her current SOB is her baseline due to asthma. She additionally endorses two episodes of non-bilious and non-bloody vomiting yesterday and then again this morning. She also additionally endorses being mildly diaphoretic  recently, but states she does not know whether she has had a fever recently or not. She denies any abdominal pain, stool or bowel issues, urinary issues, focal weakness, or cough.     Patient mentions that she ran out of her Plavix 4 days ago. She states she has been trying to get in contact with her PCP for refills, but notes she has been unsuccessful. She denies any recent illnesses or any other complications at this time.       REVIEW OF SYSTEMS  Review of Systems   Constitutional: Positive for diaphoresis.   HENT: Negative for trouble swallowing.    Respiratory: Positive for shortness of breath (baseline for her). Negative for cough.    Cardiovascular: Positive for chest pain.   Gastrointestinal: Positive for nausea and vomiting. Negative for abdominal pain and diarrhea.   Genitourinary: Negative for dysuria.   Allergic/Immunologic: Negative for immunocompromised state.   All other systems reviewed and are negative.          PAST MEDICAL HISTORY:  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     Microcytic anemia 11/17/2019     S/P coronary artery stent placement 02/02/2018     TB lung, latent     9 mos INH         PAST SURGICAL HISTORY:  Past Surgical History:   Procedure Laterality Date     CORONARY STENT PLACEMENT  2018     CV CORONARY ANGIOGRAM N/A 1/25/2018    Procedure: Coronary Angiogram;  Surgeon: Angelo Serrano MD;  Location: [facility] Cath Lab;  Service:      NE  ESOPHAGOGASTRODUODENOSCOPY TRANSORAL DIAGNOSTIC N/A 12/10/2018    Procedure: ESOPHAGOGASTRODUODENOSCOPY (EGD);  Surgeon: Eddie Renteria MD;  Location: [facility];  Service: Gastroenterology     CO ESOPHAGOGASTRODUODENOSCOPY TRANSORAL DIAGNOSTIC N/A 12/3/2020    Procedure: ESOPHAGOGASTRODUODENOSCOPY (EGD) with biospies ;  Surgeon: Avi Crow MD;  Location: [facility];  Service: Gastroenterology     [facility] COLONOSCOPY W/WO BRUSH/WASH N/A 12/10/2018    Procedure: COLONOSCOPY with polypectomy using biopsy forceps;  Surgeon: Eddie Renteria MD;  Location: [facility];  Service: Gastroenterology         CURRENT MEDICATIONS:    Prior to Admission medications    Medication Sig Start Date End Date Taking? Authorizing Provider   acetaminophen (TYLENOL) 500 MG tablet Take 2 tablets (1,000 mg) by mouth every 8 hours 1/9/15   Srinivas Jackson MD   albuterol (PROAIR HFA/PROVENTIL HFA/VENTOLIN HFA) 108 (90 Base) MCG/ACT inhaler Inhale 2 puffs into the lungs every 4 hours as needed for shortness of breath / dyspnea 9/16/21   Tamra Duong MD   albuterol (PROVENTIL) (2.5 MG/3ML) 0.083% neb solution Inhale 2.5 mg into the lungs every 6 hours as needed  7/6/21   Reported, Patient   ALLERGY RELIEF 10 MG tablet Take 10 mg by mouth daily 2/14/22   Reported, Patient   amitriptyline (ELAVIL) 10 MG tablet TAKE 2 TABLETS (20 MG TOTAL) BY MOUTH AT BEDTIME. 5/3/22   Adrien Cardoza MD   ASPIRIN LOW DOSE 81 MG EC tablet TAKE 1 TABLET (81 MG TOTAL) BY MOUTH DAILY. 3/7/22   Adrien Cardoza MD   atorvastatin (LIPITOR) 80 MG tablet TAKE 1 TABLET (80 MG TOTAL) BY MOUTH AT BEDTIME FOR CHOLESTEROL 4/7/22   Adrien Cardoza MD   B-D U/F 31G X 8 MM insulin pen needle USE ONE PEN NEEDLE DAILY AS NEEDED FOR SSI 10/13/21   Adrien Cardoza MD   Continuous Blood Gluc  (FREESTYLE MONICA 14 DAY READER) MICHAEL Uses daily 1/20/21   Reported, Patient   Continuous Blood Gluc Sensor (FREESTYLE MONICA 14 DAY SENSOR) MISC USE 1 UNITS AS DIRECTED EVERY 14 (FOURTEEN)  DAYS. 12/14/21   Adiren Cardoza MD   CONTOUR NEXT TEST test strip Pt checking blood sugar 2x per day 11/2/21   Reported, Patient   cyclobenzaprine (FLEXERIL) 5 MG tablet TAKE 1-2 TABLETS (5-10 MG) BY MOUTH 3 TIMES DAILY AS NEEDED FOR MUSCLE SPASMS 2/14/22   Yasir Trinidad DO   diclofenac (VOLTAREN) 1 % topical gel Apply 2 g topically 4 times daily 9/28/21   Adrien Cardoza MD   Dupilumab (DUPIXENT) 300 MG/2ML SOPN Inject 300 mg Subcutaneous every 14 days 3/17/22   Elizabeth Marie MD   DUPIXENT 200 MG/1.14ML SOSY INJECT THE CONTENTS OF 1 SYRINGE (200 MG) UNDER THE SKIN EVERY 14 DAYS 2/28/22   Elizabeth Marie MD   famotidine (PEPCID) 20 MG tablet Take 1 tablet (20 mg) by mouth 2 times daily 2/17/22   Ja Kramer MD   fluticasone-vilanterol (BREO ELLIPTA) 200-25 MCG/INH inhaler Inhale 1 puff into the lungs daily 12/6/21   Tamra Duong MD   furosemide (LASIX) 20 MG tablet Take 1 tablet (20 mg) by mouth daily for 10 days 11/23/21 12/3/21  Tamra Duong MD   gabapentin (NEURONTIN) 300 MG capsule Take 2 capsules (600 mg) by mouth 3 times daily 4/12/22   Adrien Cardoza MD   Global Inject Ease Lancets 30G MISC Use twice daily 6/10/21   Reported, Patient   guaiFENesin (ROBITUSSIN) 100 MG/5ML liquid Take 10 mLs (200 mg) by mouth every 4 hours as needed for cough 8/17/21   Adrien Cardoza MD   hydrochlorothiazide (MICROZIDE) 12.5 MG capsule TAKE 1 CAPSULE (12.5 MG TOTAL) BY MOUTH DAILY FOR BLOOD PRESSURE 10/13/21   Adrien Cardoza MD   hydrocortisone (CORTAID) 1 % external cream Apply topically 2 times daily 9/7/21   Adrien Cardoza MD   Insulin Aspart FlexPen 100 UNIT/ML SOPN GIVE BEFORE MEALS: FOR PRE-MEAL GLUCOSE: 140-189 GIVE 1 UNIT, 190-239 GIVE 2 UNITS, 240-289 GIVE 3 UNITS, 290-339 GIVE 4 UNITS, =OR>340 GIVE 6 UNITS *84* 2/7/22   Adrien Cardoza MD   insulin glargine (LANTUS PEN) 100 UNIT/ML pen Inject 10 Units Subcutaneous every morning AND 24 Units At Bedtime. 3/10/22   Adeel Brown MD   insulin pen needle (32G X 4 MM) 32G X  4 MM miscellaneous Use one pen needles daily or as directed. 8/17/21   Adrien Cardoza MD   ipratropium - albuterol 0.5 mg/2.5 mg/3 mL (DUONEB) 0.5-2.5 (3) MG/3ML neb solution Take 1 vial (3 mLs) by nebulization every 6 hours as needed for shortness of breath / dyspnea or wheezing 8/11/21   Tamra Duong MD   meclizine (ANTIVERT) 25 MG tablet Take 25 mg by mouth daily as needed  2/11/21   Reported, Patient   metFORMIN (GLUCOPHAGE) 1000 MG tablet Take 1 tablet (1,000 mg) by mouth 2 times daily (with meals) 4/12/22   Adrien Cardoza MD   metFORMIN (GLUCOPHAGE-XR) 500 MG 24 hr tablet Take 1 tablet (500 mg) by mouth daily (with dinner) 11/9/21   Adrien Cardoza MD   metoprolol tartrate (LOPRESSOR) 25 MG tablet TAKE 1 TABLET (25 MG TOTAL) BY MOUTH 2 (TWO) TIMES A DAY FOR BLOOD PRESSURE 4/7/22   Adrien Cardoza MD   montelukast (SINGULAIR) 10 MG tablet Take 1 tablet (10 mg) by mouth At Bedtime 12/17/21   Tamra Duong MD   omeprazole (PRILOSEC) 40 MG DR capsule Take 1 capsule (40 mg) by mouth daily 11/30/21   Adrien Cardoza MD   ondansetron (ZOFRAN) 4 MG tablet Take 1 tablet (4 mg) by mouth every 8 hours as needed for nausea 11/23/21   Tamra Duong MD   Polyethylene Glycol 3350 (PEG 3350) 17 GM/SCOOP POWD MIX 17 GRAMS WITH LIQUID AND DRINK ONCE DAILY FOR CONSTIPATION 4/7/22   Adrien Cardoza MD   polyvinyl alcohol (ARTIFICIAL TEARS) 1.4 % ophthalmic solution Place 1 drop into both eyes as needed for dry eyes 5/28/15   Raymundo Balbuena MD   predniSONE (DELTASONE) 2.5 MG tablet Take 2.5 mg by mouth daily  9/18/21   Reported, Patient   predniSONE (DELTASONE) 20 MG tablet Take 2 tabs daily x 5 days 4/18/22   Tamra Duong MD   sucralfate (CARAFATE) 1 GM tablet TAKE 1 TABLET (1 G TOTAL) BY MOUTH 4 (FOUR) TIMES A DAY BEFORE MEALS AND AT BEDTIME. TAKE 1 HOUR PRIOR TO MEALS 1/10/22   Adrien Cardoza MD   tiotropium (SPIRIVA RESPIMAT) 2.5 MCG/ACT inhalation aerosol Inhale 2 puffs into the lungs daily 5/18/16   Kalpana Dominique MD         ALLERGIES:  No Known  Allergies      FAMILY HISTORY:  Family History   Problem Relation Age of Onset     Other - See Comments Mother          of an intestinal problem     Ulcers Father          of gastritis     Breast Cancer No family hx of          SOCIAL HISTORY:  Social History     Socioeconomic History     Marital status:    Tobacco Use     Smoking status: Former Smoker     Packs/day: 1.00     Years: 30.00     Pack years: 30.00     Types: Cigarettes, Cigarettes, Cigarettes     Quit date: 2003     Years since quittin.3     Smokeless tobacco: Never Used     Tobacco comment: No passive exposure   Substance and Sexual Activity     Alcohol use: No     Drug use: No     Sexual activity: Yes     Partners: Male   Social History Narrative    2017 The patient lives with her daughter-in-law (who is present), , son, and 2 grandchildren (total of 6 people). Immigrant.         VITALS:  Patient Vitals for the past 24 hrs:   BP Temp Temp src Pulse Resp SpO2   22 1345 119/66 -- -- 103 21 99 %   22 1330 118/72 -- -- 105 21 97 %   22 1315 115/69 -- -- 106 17 97 %   22 1100 (!) 141/86 -- -- 94 19 99 %   22 1045 132/77 -- -- 86 20 97 %   22 1030 121/77 -- -- -- -- --   22 1015 114/86 -- -- 93 20 98 %   22 1000 109/85 -- -- 101 18 99 %   22 0945 119/74 -- -- 89 20 96 %   22 0930 121/78 -- -- 90 22 97 %   22 0915 116/74 -- -- 92 20 95 %   22 0900 125/81 -- -- 94 21 97 %   22 0834 (!) 145/84 98.2  F (36.8  C) Oral 103 20 97 %       Wt Readings from Last 3 Encounters:   22 58.1 kg (128 lb)   04/15/22 58.1 kg (128 lb)   22 58.1 kg (128 lb)         PHYSICAL EXAM    Constitutional:  Well developed, Well nourished, NAD, GCS 15  HENT:  Normocephalic, Atraumatic, Bilateral external ears normal, Nose normal. Neck- Supple, No stridor.  Eyes:  PERRL, EOMI, Conjunctiva normal, No discharge.  Respiratory:  Normal breath sounds, No respiratory  distress, No wheezing, Speaks full sentences easily. No cough.  Cardiovascular:  Normal heart rate, Regular rhythm, No rubs, No gallops. Chest wall nontender.   GI:  No excessive obesity.  Bowel sounds normal, Soft, No tenderness, No masses, No flank tenderness. No rebound or guarding.   : deferred  Musculoskeletal: 2+ DP pulses. No edema. No cyanosis, No clubbing. Good range of motion in all major joints. No major deformities noted.   Integument:  Warm, Dry, No erythema, No rash.  No petechiae.   Neurologic:  Alert & oriented x 3, No focal deficits noted.   Psychiatric:  Affect normal, Cooperative            LAB:  All pertinent labs reviewed and interpreted.  Recent Results (from the past 24 hour(s))   CBC (+ platelets, no diff)    Collection Time: 05/04/22  9:08 AM   Result Value Ref Range    WBC Count 7.6 4.0 - 11.0 10e3/uL    RBC Count 4.87 3.80 - 5.20 10e6/uL    Hemoglobin 12.7 11.7 - 15.7 g/dL    Hematocrit 40.9 35.0 - 47.0 %    MCV 84 78 - 100 fL    MCH 26.1 (L) 26.5 - 33.0 pg    MCHC 31.1 (L) 31.5 - 36.5 g/dL    RDW 13.8 10.0 - 15.0 %    Platelet Count 217 150 - 450 10e3/uL   Basic metabolic panel    Collection Time: 05/04/22  9:08 AM   Result Value Ref Range    Sodium 139 136 - 145 mmol/L    Potassium 3.8 3.5 - 5.0 mmol/L    Chloride 106 98 - 107 mmol/L    Carbon Dioxide (CO2) 22 22 - 31 mmol/L    Anion Gap 11 5 - 18 mmol/L    Urea Nitrogen 7 (L) 8 - 22 mg/dL    Creatinine 0.65 0.60 - 1.10 mg/dL    Calcium 9.3 8.5 - 10.5 mg/dL    Glucose 192 (H) 70 - 125 mg/dL    GFR Estimate >90 >60 mL/min/1.73m2   Troponin I (now)    Collection Time: 05/04/22  9:08 AM   Result Value Ref Range    Troponin I <0.01 0.00 - 0.29 ng/mL   Hepatic function panel    Collection Time: 05/04/22  9:08 AM   Result Value Ref Range    Bilirubin Total 0.4 0.0 - 1.0 mg/dL    Bilirubin Direct 0.1 <=0.5 mg/dL    Protein Total 6.9 6.0 - 8.0 g/dL    Albumin 3.5 3.5 - 5.0 g/dL    Alkaline Phosphatase 94 45 - 120 U/L    AST 18 0 - 40 U/L     ALT 15 0 - 45 U/L   Lipase    Collection Time: 05/04/22  9:08 AM   Result Value Ref Range    Lipase 31 0 - 52 U/L   Extra Red Top Tube    Collection Time: 05/04/22  9:08 AM   Result Value Ref Range    Hold Specimen JIC    Extra Green Top (Lithium Heparin) Tube    Collection Time: 05/04/22  9:08 AM   Result Value Ref Range    Hold Specimen JIC    Extra Purple Top Tube    Collection Time: 05/04/22  9:08 AM   Result Value Ref Range    Hold Specimen JIC    Symptomatic; Unknown Influenza A/B & SARS-CoV2 (COVID-19) Virus PCR Multiplex Nasopharyngeal    Collection Time: 05/04/22  9:19 AM    Specimen: Nasopharyngeal; Swab   Result Value Ref Range    Influenza A PCR Negative Negative    Influenza B PCR Negative Negative    SARS CoV2 PCR Negative Negative       Lab Results   Component Value Date    ABORH A POS 12/07/2018           RADIOLOGY:  Reviewed all pertinent imaging. Please see official radiology report.    CT Abdomen Pelvis w Contrast   Final Result   IMPRESSION:    1.  No abnormalities are seen to explain vomiting. Specifically, no bowel obstruction or inflammatory changes.      CT Chest Pulmonary Embolism w Contrast   Final Result   IMPRESSION:   1.  No pulmonary artery embolism or thoracic aortic dissection.   2.  No pneumonic consolidation or pleural effusion.            EKG:    Indication: Chest pain    Performed at: 08:47a  Impression: Sinus rhythm at 91 bpm.  Flipped T waves noted in lead aVR and V1.  AK interval 144 ms, QRS 86 ms,  ms.  EKG does appear similar to one from March 10, 2022.      I have independently reviewed and interpreted the EKG(s) documented above.        PROCEDURES:  None      I personally saw the patient and performed a substantive portion of the visit including all aspects of the medical decision making.      I, Roman Varghese, am serving as a scribe to document services personally performed by Dr. Christine Moncada based on my observation and the provider's statements to me. I,   Christine Moncada MD attest that Roman Varghese is acting in a scribe capacity, has observed my performance of the services and has documented them in accordance with my direction.        Christine Moncada M.D. FACE  Emergency Medicine and Medical Toxicology  [facility] EMERGENCY DEPARTMENT  [address] [place] [id number]  [phone]  Dept: [phone]         Christine Moncada MD  05/04/22 2284

## 2022-05-04 NOTE — ED PROVIDER NOTES
Emergency Department Encounter     Evaluation Date & Time:   2022  8:37 AM    CHIEF COMPLAINT:  Chest Pain      Triage Note:  Patient having chest pain for last 3-4 days, feels slightly short of breath, dizzy, nauseated. Vomited x 1 yesterday. Patient has history of COPD, stroke a year ago and 2 stents placed in 2018.     Triage Assessment     Row Name [id number]       Cardiac WDL    Cardiac WDL chest pain       Chest Pain Assessment    Chest Pain Location substernal    Chest Pain Radiation arm    Duration 3-4 days                    Impression and Plan     ED COURSE & MEDICAL DECISION MAKIN:45 AM- I met with the patient to perform a history and exam. I used a over-the-phone . Differential diagnosis includes ACS, AD, PE, PTX,  PNA, costochondritis, GERD, abdominal pathology. HEART score of 4. Initial EKG with NSR w/o ST changes.  9:08 AM- I staffed the patient with Dr. Moncada  9:19 AM- Dr. Moncada met with the patient to gather a history and perform an exam. Updated patient regarding plan.  10:00 AM- Initial labs significant for a normal WBC count, Hgb, hepatic panel, and lipase. Troponin pending. CT chest PE and CT abd/pelvis ordered.  11:22 AM- Troponin normal, CT PE run negative, CT abd/pelvis normal. I updated the patient with the plan-- admit given concern for unstable angina in the setting of multiple risk factors.   11:39 AM- Patient still with chest pain despite GI cocktail, nitroglycerin given.  12:58 PM- Went to see patient. She states her chest pain has improved after the nitroglycerin, although she still endorses symptoms. Another order for nitroglycerin placed. Discussed plan for admission once a bed is found.  2:52 PM- Went to see patient. She is feeling comfortable. She understands the plan.       See Dr. Moncada's note for further details.      At the conclusion of the encounter I discussed the results of all the tests and the disposition. The questions were answered. The  "patient or family acknowledged understanding and was agreeable with the care plan.    FINAL IMPRESSION:    ICD-10-CM    1. Chest pain, unspecified type  R07.9           MEDICATIONS GIVEN IN THE EMERGENCY DEPARTMENT:  Medications   nitroGLYcerin (NITROSTAT) sublingual tablet 0.4 mg (0.4 mg Sublingual Given 5/4/22 1135)   nitroGLYcerin (NITRO-BID) 2 % ointment 7.5 mg (7.5 mg Transdermal Patch/Med Applied 5/4/22 1326)   aspirin (ASA) chewable tablet 81 mg (81 mg Oral Given 5/4/22 1004)   lidocaine (viscous) (XYLOCAINE) 2 % 15 mL, alum & mag hydroxide-simethicone (MAALOX) 15 mL GI Cocktail (30 mLs Oral Given 5/4/22 1006)   ondansetron (ZOFRAN) injection 4 mg (4 mg Intravenous Given 5/4/22 1005)   iopamidol (ISOVUE-370) solution 100 mL (100 mLs Intravenous Given 5/4/22 1035)       NEW PRESCRIPTIONS STARTED AT TODAY'S ED VISIT:  New Prescriptions    No medications on file       HPI     HPI     Kulwant Amin is a 52 year old female with a pertinent history of CAD s/p stent placement in 2018, CVA in 2021, T2DM, HTN, HLD, latent TB, and asthma vs. COPD who presents to this ED for evaluation of chest pain.    Patient reports four days of moderate, substernal, pressure-like chest pain with radiation to her left arm. It is exacerbated with activity. She has had this pain before, multiple times. She also endorses some dyspnea; although denies that it is significantly different than her baseline. She also notes not taking her \"blood thinner\" for the past four days due to a problem with refilling her prescription. No history of PE or DVT per patient. No focal weakness or deficits noted.     Patient follows with cardiology. Last MR cardiac stress test in 7/2019 negative for inducible ischemia, normal EF.    She additionally had one episode of NBNB emesis yesterday. She denies recent fevers, nausea, abdominal pain, or stool changes. Does endorse some diaphoresis.    REVIEW OF SYSTEMS:  Review of Systems.  A 10-point review of " systems was completed and is negative unless noted in the HPI.      Medical History     Past Medical History:   Diagnosis Date     Acute asthma exacerbation 2020     Anxiety      Arthritis      Asthma exacerbation 2015     Chronic obstructive pulmonary disease, unspecified COPD type (H)      COPD (chronic obstructive pulmonary disease) (H)      Coronary artery disease due to lipid rich plaque      Depression      Epigastric pain 12/15/2021     Essential hypertension      GERD (gastroesophageal reflux disease)      Infection due to  novel coronavirus 2022     Latent tuberculosis 2019     Microcytic anemia 2019     S/P coronary artery stent placement 2018     TB lung, latent        Past Surgical History:   Procedure Laterality Date     CORONARY STENT PLACEMENT       CV CORONARY ANGIOGRAM N/A 2018    Procedure: Coronary Angiogram;  Surgeon: Angelo Serrano MD;  Location: [facility];  Service:      TX ESOPHAGOGASTRODUODENOSCOPY TRANSORAL DIAGNOSTIC N/A 12/10/2018    Procedure: ESOPHAGOGASTRODUODENOSCOPY (EGD);  Surgeon: Eddie Renteria MD;  Location: [facility];  Service: Gastroenterology     TX ESOPHAGOGASTRODUODENOSCOPY TRANSORAL DIAGNOSTIC N/A 12/3/2020    Procedure: ESOPHAGOGASTRODUODENOSCOPY (EGD) with biospies ;  Surgeon: Avi Crow MD;  Location: [facility];  Service: Gastroenterology     [facility] COLONOSCOPY W/WO BRUSH/WASH N/A 12/10/2018    Procedure: COLONOSCOPY with polypectomy using biopsy forceps;  Surgeon: Eddie Renteria MD;  Location: [facility];  Service: Gastroenterology       Family History   Problem Relation Age of Onset     Other - See Comments Mother          of an intestinal problem     Ulcers Father          of gastritis     Breast Cancer No family hx of        Social History     Tobacco Use     Smoking status: Former Smoker     Packs/day: 1.00     Years: 30.00     Pack years: 30.00     Types: Cigarettes,  Cigarettes, Cigarettes     Quit date: 2003     Years since quittin.3     Smokeless tobacco: Never Used     Tobacco comment: No passive exposure   Substance Use Topics     Alcohol use: No     Drug use: No       acetaminophen (TYLENOL) 500 MG tablet  albuterol (PROAIR HFA/PROVENTIL HFA/VENTOLIN HFA) 108 (90 Base) MCG/ACT inhaler  albuterol (PROVENTIL) (2.5 MG/3ML) 0.083% neb solution  amitriptyline (ELAVIL) 10 MG tablet  ASPIRIN LOW DOSE 81 MG EC tablet  atorvastatin (LIPITOR) 80 MG tablet  cyclobenzaprine (FLEXERIL) 5 MG tablet  Dupilumab (DUPIXENT) 300 MG/2ML SOPN  famotidine (PEPCID) 20 MG tablet  fluticasone-vilanterol (BREO ELLIPTA) 200-25 MCG/INH inhaler  gabapentin (NEURONTIN) 300 MG capsule  guaiFENesin (ROBITUSSIN) 100 MG/5ML liquid  hydrochlorothiazide (MICROZIDE) 12.5 MG capsule  hydrocortisone (CORTAID) 1 % external cream  Insulin Aspart FlexPen 100 UNIT/ML SOPN  insulin glargine (LANTUS PEN) 100 UNIT/ML pen  ipratropium - albuterol 0.5 mg/2.5 mg/3 mL (DUONEB) 0.5-2.5 (3) MG/3ML neb solution  loratadine (CLARITIN) 10 MG tablet  meclizine (ANTIVERT) 25 MG tablet  metFORMIN (GLUCOPHAGE) 1000 MG tablet  metoprolol tartrate (LOPRESSOR) 25 MG tablet  montelukast (SINGULAIR) 10 MG tablet  omeprazole (PRILOSEC) 40 MG DR capsule  Polyethylene Glycol 3350 (PEG 3350) 17 GM/SCOOP POWD  polyvinyl alcohol (ARTIFICIAL TEARS) 1.4 % ophthalmic solution  sucralfate (CARAFATE) 1 GM tablet  tiotropium (SPIRIVA RESPIMAT) 2.5 MCG/ACT inhalation aerosol  B-D U/F 31G X 8 MM insulin pen needle  Continuous Blood Gluc  (FREESTYLE MONICA 14 DAY READER) MICHAEL  Continuous Blood Gluc Sensor (FREESTYLE MONICA 14 DAY SENSOR) MISC  CONTOUR NEXT TEST test strip  Global Inject Ease Lancets 30G MISC  insulin pen needle (32G X 4 MM) 32G X 4 MM miscellaneous        Physical Exam     First Vitals:  Patient Vitals for the past 24 hrs:   BP Temp Temp src Pulse Resp SpO2   22 1345 119/66 -- -- 103 21 99 %   22 1330 118/72  -- -- 105 21 97 %   05/04/22 1315 115/69 -- -- 106 17 97 %   05/04/22 1100 (!) 141/86 -- -- 94 19 99 %   05/04/22 1045 132/77 -- -- 86 20 97 %   05/04/22 1030 121/77 -- -- -- -- --   05/04/22 1015 114/86 -- -- 93 20 98 %   05/04/22 1000 109/85 -- -- 101 18 99 %   05/04/22 0945 119/74 -- -- 89 20 96 %   05/04/22 0930 121/78 -- -- 90 22 97 %   05/04/22 0915 116/74 -- -- 92 20 95 %   05/04/22 0900 125/81 -- -- 94 21 97 %   05/04/22 0834 (!) 145/84 98.2  F (36.8  C) Oral 103 20 97 %       PHYSICAL EXAM:   Physical Exam  Constitutional:  NAD, appears stated age, alert, cooperative  HENT:  normocephalic, atraumatic  Eyes:  PERRL, extraocular muscles intact, conjunctiva/sclera normal  Respiratory:  No respiratory distress, breath sounds equal, clear to auscultation, no wheezing, crackles, or rhonchi  Cardiovascular:  Tachycardic with regular rhythm, no murmurs, no chest wall tenderness, capillary refill <2sec   GI:  Soft, non tender, non distended, normal bowel sounds    Musculoskeletal: No peripheral edema, extremities warm and well perfused  Skin: warm, dry, no rashes or lesions  Neurologic:  alert, orientated, CN II-XII grossly intact, no focal deficits noted  Psychiatric: normal mood and affect    Results     LAB:  All pertinent labs reviewed and interpreted  Labs Ordered and Resulted from Time of ED Arrival to Time of ED Departure   CBC WITH PLATELETS - Abnormal       Result Value    WBC Count 7.6      RBC Count 4.87      Hemoglobin 12.7      Hematocrit 40.9      MCV 84      MCH 26.1 (*)     MCHC 31.1 (*)     RDW 13.8      Platelet Count 217     BASIC METABOLIC PANEL - Abnormal    Sodium 139      Potassium 3.8      Chloride 106      Carbon Dioxide (CO2) 22      Anion Gap 11      Urea Nitrogen 7 (*)     Creatinine 0.65      Calcium 9.3      Glucose 192 (*)     GFR Estimate >90     TROPONIN I - Normal    Troponin I <0.01     HEPATIC FUNCTION PANEL - Normal    Bilirubin Total 0.4      Bilirubin Direct 0.1      Protein  Total 6.9      Albumin 3.5      Alkaline Phosphatase 94      AST 18      ALT 15     LIPASE - Normal    Lipase 31     INFLUENZA A/B & SARS-COV2 PCR MULTIPLEX - Normal    Influenza A PCR Negative      Influenza B PCR Negative      SARS CoV2 PCR Negative     TROPONIN I       RADIOLOGY:  CT Abdomen Pelvis w Contrast   Final Result   IMPRESSION:    1.  No abnormalities are seen to explain vomiting. Specifically, no bowel obstruction or inflammatory changes.      CT Chest Pulmonary Embolism w Contrast   Final Result   IMPRESSION:   1.  No pulmonary artery embolism or thoracic aortic dissection.   2.  No pneumonic consolidation or pleural effusion.               ECG:    Performed at: 5/4/22 8:47am    Impression: Normal sinus rhythm, similar to prior 3/2022, no ST changes    Rate: 91   Rhythm: normal sinus rhythm  ID Interval: 144  QRS Interval: 86  QTc Interval: 467  ST Changes: flipped T waves in aVR and V1 (similar to prior)    I have independently reviewed and interpreted the EKS(s) documented above    PROCEDURES:  Procedures:  N/    [facility] Documentation        Helena Lara MD  [facility] EMERGENCY DEPARTMENT       Helena Lara MD  Resident  05/04/22 0913

## 2022-05-05 ENCOUNTER — APPOINTMENT (OUTPATIENT)
Dept: CARDIOLOGY | Facility: HOSPITAL | Age: 52
End: 2022-05-05
Attending: INTERNAL MEDICINE
Payer: COMMERCIAL

## 2022-05-05 PROBLEM — R07.9 CHEST PAIN, UNSPECIFIED TYPE: Status: ACTIVE | Noted: 2022-05-05

## 2022-05-05 LAB
ABO/RH(D): NORMAL
ALBUMIN SERPL-MCNC: 3.3 G/DL (ref 3.5–5)
ALP SERPL-CCNC: 89 U/L (ref 45–120)
ALT SERPL W P-5'-P-CCNC: 14 U/L (ref 0–45)
ANION GAP SERPL CALCULATED.3IONS-SCNC: 10 MMOL/L (ref 5–18)
ANTIBODY SCREEN: NEGATIVE
AST SERPL W P-5'-P-CCNC: 17 U/L (ref 0–40)
ATRIAL RATE - MUSE: 105 BPM
ATRIAL RATE - MUSE: 91 BPM
BASOPHILS # BLD AUTO: 0.1 10E3/UL (ref 0–0.2)
BASOPHILS NFR BLD AUTO: 1 %
BILIRUB SERPL-MCNC: 0.5 MG/DL (ref 0–1)
BUN SERPL-MCNC: 11 MG/DL (ref 8–22)
CALCIUM SERPL-MCNC: 9.1 MG/DL (ref 8.5–10.5)
CHLORIDE BLD-SCNC: 106 MMOL/L (ref 98–107)
CO2 SERPL-SCNC: 25 MMOL/L (ref 22–31)
CREAT SERPL-MCNC: 0.69 MG/DL (ref 0.6–1.1)
DIASTOLIC BLOOD PRESSURE - MUSE: 81 MMHG
DIASTOLIC BLOOD PRESSURE - MUSE: NORMAL MMHG
EOSINOPHIL # BLD AUTO: 1.4 10E3/UL (ref 0–0.7)
EOSINOPHIL NFR BLD AUTO: 16 %
ERYTHROCYTE [DISTWIDTH] IN BLOOD BY AUTOMATED COUNT: 13.9 % (ref 10–15)
GFR SERPL CREATININE-BSD FRML MDRD: >90 ML/MIN/1.73M2
GLUCOSE BLD-MCNC: 220 MG/DL (ref 70–125)
GLUCOSE BLDC GLUCOMTR-MCNC: 153 MG/DL (ref 70–99)
GLUCOSE BLDC GLUCOMTR-MCNC: 153 MG/DL (ref 70–99)
GLUCOSE BLDC GLUCOMTR-MCNC: 210 MG/DL (ref 70–99)
GLUCOSE BLDC GLUCOMTR-MCNC: 256 MG/DL (ref 70–99)
GLUCOSE BLDC GLUCOMTR-MCNC: 308 MG/DL (ref 70–99)
GLUCOSE BLDC GLUCOMTR-MCNC: 434 MG/DL (ref 70–99)
HCT VFR BLD AUTO: 39.9 % (ref 35–47)
HGB BLD-MCNC: 12.5 G/DL (ref 11.7–15.7)
HOLD SPECIMEN: NORMAL
IMM GRANULOCYTES # BLD: 0 10E3/UL
IMM GRANULOCYTES NFR BLD: 0 %
INTERPRETATION ECG - MUSE: NORMAL
INTERPRETATION ECG - MUSE: NORMAL
LVEF ECHO: NORMAL
LYMPHOCYTES # BLD AUTO: 1.9 10E3/UL (ref 0.8–5.3)
LYMPHOCYTES NFR BLD AUTO: 22 %
MAGNESIUM SERPL-MCNC: 2 MG/DL (ref 1.8–2.6)
MCH RBC QN AUTO: 26.3 PG (ref 26.5–33)
MCHC RBC AUTO-ENTMCNC: 31.3 G/DL (ref 31.5–36.5)
MCV RBC AUTO: 84 FL (ref 78–100)
MONOCYTES # BLD AUTO: 0.5 10E3/UL (ref 0–1.3)
MONOCYTES NFR BLD AUTO: 6 %
NEUTROPHILS # BLD AUTO: 4.8 10E3/UL (ref 1.6–8.3)
NEUTROPHILS NFR BLD AUTO: 55 %
NRBC # BLD AUTO: 0 10E3/UL
NRBC BLD AUTO-RTO: 0 /100
P AXIS - MUSE: 33 DEGREES
P AXIS - MUSE: 56 DEGREES
PLATELET # BLD AUTO: 230 10E3/UL (ref 150–450)
POTASSIUM BLD-SCNC: 3.8 MMOL/L (ref 3.5–5)
PR INTERVAL - MUSE: 144 MS
PR INTERVAL - MUSE: 150 MS
PROT SERPL-MCNC: 6.7 G/DL (ref 6–8)
QRS DURATION - MUSE: 86 MS
QRS DURATION - MUSE: 88 MS
QT - MUSE: 378 MS
QT - MUSE: 380 MS
QTC - MUSE: 467 MS
QTC - MUSE: 499 MS
R AXIS - MUSE: -89 DEGREES
R AXIS - MUSE: 250 DEGREES
RBC # BLD AUTO: 4.75 10E6/UL (ref 3.8–5.2)
SODIUM SERPL-SCNC: 141 MMOL/L (ref 136–145)
SPECIMEN EXPIRATION DATE: NORMAL
SYSTOLIC BLOOD PRESSURE - MUSE: 129 MMHG
SYSTOLIC BLOOD PRESSURE - MUSE: NORMAL MMHG
T AXIS - MUSE: 45 DEGREES
T AXIS - MUSE: 56 DEGREES
VENTRICULAR RATE- MUSE: 105 BPM
VENTRICULAR RATE- MUSE: 91 BPM
WBC # BLD AUTO: 8.8 10E3/UL (ref 4–11)

## 2022-05-05 PROCEDURE — 250N000009 HC RX 250: Performed by: INTERNAL MEDICINE

## 2022-05-05 PROCEDURE — 99214 OFFICE O/P EST MOD 30 MIN: CPT | Performed by: INTERNAL MEDICINE

## 2022-05-05 PROCEDURE — 250N000013 HC RX MED GY IP 250 OP 250 PS 637: Performed by: INTERNAL MEDICINE

## 2022-05-05 PROCEDURE — 258N000003 HC RX IP 258 OP 636: Performed by: INTERNAL MEDICINE

## 2022-05-05 PROCEDURE — 83735 ASSAY OF MAGNESIUM: CPT | Performed by: INTERNAL MEDICINE

## 2022-05-05 PROCEDURE — 80053 COMPREHEN METABOLIC PANEL: CPT | Performed by: INTERNAL MEDICINE

## 2022-05-05 PROCEDURE — 250N000011 HC RX IP 250 OP 636: Performed by: INTERNAL MEDICINE

## 2022-05-05 PROCEDURE — 272N000001 HC OR GENERAL SUPPLY STERILE: Performed by: INTERNAL MEDICINE

## 2022-05-05 PROCEDURE — 250N000013 HC RX MED GY IP 250 OP 250 PS 637: Performed by: NURSE PRACTITIONER

## 2022-05-05 PROCEDURE — 82962 GLUCOSE BLOOD TEST: CPT

## 2022-05-05 PROCEDURE — 96372 THER/PROPH/DIAG INJ SC/IM: CPT | Mod: XU

## 2022-05-05 PROCEDURE — 999N000208 ECHOCARDIOGRAM COMPLETE

## 2022-05-05 PROCEDURE — 255N000002 HC RX 255 OP 636: Performed by: INTERNAL MEDICINE

## 2022-05-05 PROCEDURE — 86850 RBC ANTIBODY SCREEN: CPT | Performed by: INTERNAL MEDICINE

## 2022-05-05 PROCEDURE — C1769 GUIDE WIRE: HCPCS | Performed by: INTERNAL MEDICINE

## 2022-05-05 PROCEDURE — 85025 COMPLETE CBC W/AUTO DIFF WBC: CPT | Performed by: INTERNAL MEDICINE

## 2022-05-05 PROCEDURE — 99214 OFFICE O/P EST MOD 30 MIN: CPT | Mod: 25 | Performed by: INTERNAL MEDICINE

## 2022-05-05 PROCEDURE — G0378 HOSPITAL OBSERVATION PER HR: HCPCS

## 2022-05-05 PROCEDURE — 93454 CORONARY ARTERY ANGIO S&I: CPT | Performed by: INTERNAL MEDICINE

## 2022-05-05 PROCEDURE — 93454 CORONARY ARTERY ANGIO S&I: CPT | Mod: 26 | Performed by: INTERNAL MEDICINE

## 2022-05-05 PROCEDURE — 86901 BLOOD TYPING SEROLOGIC RH(D): CPT | Performed by: INTERNAL MEDICINE

## 2022-05-05 PROCEDURE — 93306 TTE W/DOPPLER COMPLETE: CPT | Mod: 26 | Performed by: INTERNAL MEDICINE

## 2022-05-05 PROCEDURE — C1894 INTRO/SHEATH, NON-LASER: HCPCS | Performed by: INTERNAL MEDICINE

## 2022-05-05 PROCEDURE — 36415 COLL VENOUS BLD VENIPUNCTURE: CPT | Performed by: INTERNAL MEDICINE

## 2022-05-05 RX ORDER — IODIXANOL 320 MG/ML
INJECTION, SOLUTION INTRAVASCULAR
Status: DISCONTINUED | OUTPATIENT
Start: 2022-05-05 | End: 2022-05-05 | Stop reason: HOSPADM

## 2022-05-05 RX ORDER — LORATADINE 10 MG/1
10 TABLET ORAL DAILY
Status: DISCONTINUED | OUTPATIENT
Start: 2022-05-06 | End: 2022-05-08 | Stop reason: HOSPADM

## 2022-05-05 RX ORDER — NALOXONE HYDROCHLORIDE 0.4 MG/ML
0.4 INJECTION, SOLUTION INTRAMUSCULAR; INTRAVENOUS; SUBCUTANEOUS
Status: ACTIVE | OUTPATIENT
Start: 2022-05-05 | End: 2022-05-05

## 2022-05-05 RX ORDER — DIAZEPAM 5 MG
10 TABLET ORAL ONCE
Status: COMPLETED | OUTPATIENT
Start: 2022-05-05 | End: 2022-05-05

## 2022-05-05 RX ORDER — SODIUM CHLORIDE 9 MG/ML
INJECTION, SOLUTION INTRAVENOUS CONTINUOUS
Status: DISCONTINUED | OUTPATIENT
Start: 2022-05-05 | End: 2022-05-08 | Stop reason: HOSPADM

## 2022-05-05 RX ORDER — ASPIRIN 81 MG/1
243 TABLET, CHEWABLE ORAL ONCE
Status: COMPLETED | OUTPATIENT
Start: 2022-05-05 | End: 2022-05-05

## 2022-05-05 RX ORDER — OXYCODONE HYDROCHLORIDE 5 MG/1
10 TABLET ORAL EVERY 4 HOURS PRN
Status: DISCONTINUED | OUTPATIENT
Start: 2022-05-05 | End: 2022-05-06

## 2022-05-05 RX ORDER — ALBUTEROL SULFATE 90 UG/1
2 AEROSOL, METERED RESPIRATORY (INHALATION) EVERY 4 HOURS PRN
Status: DISCONTINUED | OUTPATIENT
Start: 2022-05-05 | End: 2022-05-08 | Stop reason: HOSPADM

## 2022-05-05 RX ORDER — NALOXONE HYDROCHLORIDE 0.4 MG/ML
0.2 INJECTION, SOLUTION INTRAMUSCULAR; INTRAVENOUS; SUBCUTANEOUS
Status: ACTIVE | OUTPATIENT
Start: 2022-05-05 | End: 2022-05-05

## 2022-05-05 RX ORDER — ASPIRIN 325 MG
325 TABLET ORAL ONCE
Status: COMPLETED | OUTPATIENT
Start: 2022-05-05 | End: 2022-05-05

## 2022-05-05 RX ORDER — ATROPINE SULFATE 0.1 MG/ML
0.5 INJECTION INTRAVENOUS
Status: ACTIVE | OUTPATIENT
Start: 2022-05-05 | End: 2022-05-05

## 2022-05-05 RX ORDER — SODIUM CHLORIDE 9 MG/ML
INJECTION, SOLUTION INTRAVENOUS CONTINUOUS
Status: DISCONTINUED | OUTPATIENT
Start: 2022-05-05 | End: 2022-05-05 | Stop reason: HOSPADM

## 2022-05-05 RX ORDER — IPRATROPIUM BROMIDE AND ALBUTEROL SULFATE 2.5; .5 MG/3ML; MG/3ML
3 SOLUTION RESPIRATORY (INHALATION) EVERY 6 HOURS PRN
Status: DISCONTINUED | OUTPATIENT
Start: 2022-05-05 | End: 2022-05-08 | Stop reason: HOSPADM

## 2022-05-05 RX ORDER — FLUMAZENIL 0.1 MG/ML
0.2 INJECTION, SOLUTION INTRAVENOUS
Status: ACTIVE | OUTPATIENT
Start: 2022-05-05 | End: 2022-05-05

## 2022-05-05 RX ORDER — OXYCODONE HYDROCHLORIDE 5 MG/1
5 TABLET ORAL EVERY 4 HOURS PRN
Status: DISCONTINUED | OUTPATIENT
Start: 2022-05-05 | End: 2022-05-06

## 2022-05-05 RX ORDER — LIDOCAINE 40 MG/G
CREAM TOPICAL
Status: DISCONTINUED | OUTPATIENT
Start: 2022-05-05 | End: 2022-05-05 | Stop reason: HOSPADM

## 2022-05-05 RX ORDER — ACETAMINOPHEN 325 MG/1
650 TABLET ORAL EVERY 4 HOURS PRN
Status: DISCONTINUED | OUTPATIENT
Start: 2022-05-05 | End: 2022-05-06

## 2022-05-05 RX ORDER — FENTANYL CITRATE 50 UG/ML
25 INJECTION, SOLUTION INTRAMUSCULAR; INTRAVENOUS
Status: DISCONTINUED | OUTPATIENT
Start: 2022-05-05 | End: 2022-05-05 | Stop reason: HOSPADM

## 2022-05-05 RX ORDER — FENTANYL CITRATE 50 UG/ML
INJECTION, SOLUTION INTRAMUSCULAR; INTRAVENOUS
Status: DISCONTINUED | OUTPATIENT
Start: 2022-05-05 | End: 2022-05-05 | Stop reason: HOSPADM

## 2022-05-05 RX ORDER — SODIUM CHLORIDE 9 MG/ML
75 INJECTION, SOLUTION INTRAVENOUS CONTINUOUS
Status: ACTIVE | OUTPATIENT
Start: 2022-05-05 | End: 2022-05-05

## 2022-05-05 RX ADMIN — UMECLIDINIUM 1 PUFF: 62.5 AEROSOL, POWDER ORAL at 08:43

## 2022-05-05 RX ADMIN — SODIUM CHLORIDE: 9 INJECTION, SOLUTION INTRAVENOUS at 11:50

## 2022-05-05 RX ADMIN — SODIUM CHLORIDE: 9 INJECTION, SOLUTION INTRAVENOUS at 16:20

## 2022-05-05 RX ADMIN — SUCRALFATE 1 G: 1 TABLET ORAL at 22:15

## 2022-05-05 RX ADMIN — FAMOTIDINE 20 MG: 20 TABLET ORAL at 21:17

## 2022-05-05 RX ADMIN — ASPIRIN 81 MG CHEWABLE TABLET 243 MG: 81 TABLET CHEWABLE at 11:51

## 2022-05-05 RX ADMIN — GABAPENTIN 600 MG: 300 CAPSULE ORAL at 21:16

## 2022-05-05 RX ADMIN — AMITRIPTYLINE HYDROCHLORIDE 20 MG: 10 TABLET, FILM COATED ORAL at 21:15

## 2022-05-05 RX ADMIN — ACETAMINOPHEN 650 MG: 325 TABLET ORAL at 15:43

## 2022-05-05 RX ADMIN — HYDROCHLOROTHIAZIDE 12.5 MG: 12.5 TABLET ORAL at 09:14

## 2022-05-05 RX ADMIN — PANTOPRAZOLE SODIUM 40 MG: 40 TABLET, DELAYED RELEASE ORAL at 08:40

## 2022-05-05 RX ADMIN — SUCRALFATE 1 G: 1 TABLET ORAL at 08:40

## 2022-05-05 RX ADMIN — NITROGLYCERIN 0.4 MG: 0.4 TABLET, ORALLY DISINTEGRATING SUBLINGUAL at 14:10

## 2022-05-05 RX ADMIN — METOPROLOL TARTRATE 25 MG: 25 TABLET, FILM COATED ORAL at 08:40

## 2022-05-05 RX ADMIN — ASPIRIN 81 MG: 81 TABLET, COATED ORAL at 09:14

## 2022-05-05 RX ADMIN — METOPROLOL TARTRATE 25 MG: 25 TABLET, FILM COATED ORAL at 21:20

## 2022-05-05 RX ADMIN — DIAZEPAM 10 MG: 5 TABLET ORAL at 12:09

## 2022-05-05 RX ADMIN — GABAPENTIN 600 MG: 300 CAPSULE ORAL at 08:39

## 2022-05-05 RX ADMIN — ATORVASTATIN CALCIUM 80 MG: 40 TABLET, FILM COATED ORAL at 21:16

## 2022-05-05 RX ADMIN — FLUTICASONE FUROATE AND VILANTEROL TRIFENATATE 1 PUFF: 200; 25 POWDER RESPIRATORY (INHALATION) at 08:43

## 2022-05-05 RX ADMIN — MONTELUKAST 10 MG: 10 TABLET, FILM COATED ORAL at 21:16

## 2022-05-05 RX ADMIN — FAMOTIDINE 20 MG: 20 TABLET ORAL at 08:40

## 2022-05-05 RX ADMIN — ACETAMINOPHEN 650 MG: 325 TABLET ORAL at 21:16

## 2022-05-05 RX ADMIN — PERFLUTREN 3 ML: 6.52 INJECTION, SUSPENSION INTRAVENOUS at 08:30

## 2022-05-05 RX ADMIN — SUCRALFATE 1 G: 1 TABLET ORAL at 17:24

## 2022-05-05 ASSESSMENT — ACTIVITIES OF DAILY LIVING (ADL)
ADLS_ACUITY_SCORE: 13
ADLS_ACUITY_SCORE: 13

## 2022-05-05 NOTE — PLAN OF CARE
Goal Outcome Evaluation:        Pt transferred from ED 17 to [facility] 1 awaiting CA/P.PCI.  used for consents and to answer questions. Pt prepped and ready for procedure. Daughter in law at bedside.      Roopa Rodriguez RN

## 2022-05-05 NOTE — PROGRESS NOTES
[facility]    Medicine Progress Note - Hospitalist Service    Date of Admission:  5/4/2022    Assessment & Plan            52-year-old female with history of latent TB, asthma/COPD, hypertension, hyperthyroidism, anxiety, chronic abdominal pain, GERD, DM2, coronary artery disease, prior stroke and mood disorder presents with chest pain        1.Chest pain: Noted is history of CAD(hx of stent 2018) and reactive airways disease.  Patient presents with exertional chest pain for 4 days.  States that she ran out of her Plavix 4 days ago(per review of cards notes, were planning on stopping)--Cards will continue to hold Plavix now and get cath done as plan  -CTA neg pe, trops neg    -echo done 5/5/22  Left ventricular size, wall motion and function are normal. The ejection  fraction is 55-60%.  There is borderline anterior, septal, and apical wall hypokinesis.  Normal right ventricle size and systolic function.  No hemodynamically significant valvular abnormalities on 2D or color flow  imaging.  -concern for her symptoms to be angina for her and DM not controlled  -keep npo, I will place her on IVF NS now (npo, and had CTA last night)  -cath per cards    2.N/V  -cardiac work up  - Continue home famotidine, PPI and sucralfate  -will ask GI to see as well     3.History of COPD/asthma:Not hypoxic, CTA of the chest clear  -continue home inhalers and singulair  - Of note patient receives Dupixent dosing every 2 weeks     4.DM2:   - Hold metformin(got CTA and now npo)  -now npo for cath --lantus, decrease and add novolog q 4 hours      5.HTN: continue home hydrochlorothiazide and metoprolol      6.Chronic pain, mood disorder: Continue home Elavil, Flexeril, gabapentin     7.History of prior stroke: Continue home aspirin and statin     8.History of hyperthyroidism: Patient not on any chronic therapy for this, last TSH checked January 2021 was normal.     9.DVT PPX: SCD     Code status:  Full  Code    Addendum--at 1712  -cath done and gi will not do egd here  -back on DM diet, bs up, increase lantus tonight back to 24 units        Disposition Plan   Expected Discharge:1-2 days, cath lab today and still GI to see       The patient's care was discussed with the Patient.    Whit Garcia MD  Hospitalist Service  [facility]  Securely message with the Vocera Web Console (learn more here)  Text page via [employer] Paging/Directory           ______________________________________________________________________    Interval History   I used phone   She notes yesterday sharp pain in chest --pointed to center and still has some today--not as bad  Less sob today  Denied abd pain, but has nausea still today      Data reviewed today: I reviewed all medications, new labs and imaging results over the last 24 hours. I personally reviewed no images or EKG's today.    Physical Exam   Vital Signs: Temp: 98.5  F (36.9  C) Temp src: Oral BP: 116/64 Pulse: 86   Resp: 20 SpO2: 97 % O2 Device: None (Room air)    Weight: 129 lbs 0 oz  Constitutional: awake, fatigued, alert, cooperative, no apparent distress and appears stated age  Eyes: sclera clear  Respiratory: No increased work of breathing, good air exchange, clear to auscultation bilaterally, no crackles or wheezing  Cardiovascular: Normal apical impulse, regular rate and rhythm, normal S1 and S2, no S3 or S4, and no murmur noted  GI: normal bowel sounds, soft, non-distended and non-tender  Skin: no bruising or bleeding  Musculoskeletal: no lower extremity pitting edema present  Neurologic: Mental Status Exam:  Level of Alertness:   awake  Motor Exam:  moves all extremities well and symmetrically  Neuropsychiatric: General: normal, calm and normal eye contact    Data   Recent Labs   Lab 05/05/22  0837 05/05/22  0737 05/05/22  0204 05/04/22  2144 05/04/22  0908   WBC  --  8.8  --   --  7.6   HGB  --  12.5  --   --  12.7   MCV  --  84  --    --  84   PLT  --  230  --   --  217   NA  --  141  --   --  139   POTASSIUM  --  3.8  --   --  3.8   CHLORIDE  --  106  --   --  106   CO2  --  25  --   --  22   BUN  --  11  --   --  7*   CR  --  0.69  --   --  0.65   ANIONGAP  --  10  --   --  11   FREDY  --  9.1  --   --  9.3   * 220* 256*   < > 192*   ALBUMIN  --  3.3*  --   --  3.5   PROTTOTAL  --  6.7  --   --  6.9   BILITOTAL  --  0.5  --   --  0.4   ALKPHOS  --  89  --   --  94   ALT  --  14  --   --  15   AST  --  17  --   --  18   LIPASE  --   --   --   --  31    < > = values in this interval not displayed.     Recent Results (from the past 24 hour(s))   Echocardiogram Complete   Result Value    LVEF  55-60%    Narrative    [id number]  [id number]  [id number]  [id number]^LEROY^LIANA^[place] [id number]     Name: FRANCIA MATTSON  MRN: [id number]  : 1970  Study Date: 2022 08:00 AM  Age: 52 yrs  Gender: Female  Patient [facility]  Reason For Study: Chest Pain  Ordering Physician: LIANA HARPER  Performed By: SURAJ     BSA: 1.6 m2  Height: 62 in  Weight: 129 lb  HR: 80  ______________________________________________________________________________  Procedure  Complete Echo Adult. Definity (NDC #33081-976) given intravenously.  ______________________________________________________________________________  Interpretation Summary     Left ventricular size, wall motion and function are normal. The ejection  fraction is 55-60%.  There is borderline anterior, septal, and apical wall hypokinesis.  Normal right ventricle size and systolic function.  No hemodynamically significant valvular abnormalities on 2D or color flow  imaging.  ______________________________________________________________________________  Left Ventricle  Left ventricular size, wall motion and function are normal. The ejection  fraction is 55-60%. There is normal left ventricular wall thickness. Left  ventricular diastolic  function is normal. There is borderline anterior,  septal, and apical wall hypokinesis.     Right Ventricle  Normal right ventricle size and systolic function.     Atria  Normal left atrial size. Right atrial size is normal. There is no color  Doppler evidence of an atrial shunt.     Mitral Valve  Mitral valve leaflets appear normal. There is no evidence of mitral stenosis  or clinically significant mitral regurgitation.     Tricuspid Valve  Tricuspid valve leaflets appear normal. There is no evidence of tricuspid  stenosis or clinically significant tricuspid regurgitation. Right ventricle  systolic pressure estimate normal.     Aortic Valve  Aortic valve leaflets appear normal. There is no evidence of aortic stenosis  or clinically significant aortic regurgitation.     Pulmonic Valve  The pulmonic valve is not well seen, but is grossly normal. This degree of  valvular regurgitation is within normal limits.     Vessels  The aorta root is normal. Normal size ascending aorta. IVC diameter <2.1 cm  collapsing >50% with sniff suggests a normal RA pressure of 3 mmHg.     Pericardium  There is no pericardial effusion.     ______________________________________________________________________________  MMode/2D Measurements & Calculations  Ao root diam: 3.4 cm  LA dimension: 3.1 cm  asc Aorta Diam: 3.9 cm     LA/Ao: 0.91  LVOT diam: 2.3 cm  LVOT area: 4.2 cm2  LA Volume Indexed (AL/bp): 16.0 ml/m2     Doppler Measurements & Calculations  MV E max jonathan: 55.7 cm/sec  MV A max jonathan: 62.1 cm/sec  MV E/A: 0.90  MV dec slope: 256.1 cm/sec2  MV dec time: 0.22 sec  Ao V2 max: 129.7 cm/sec  Ao max P.0 mmHg  Ao V2 mean: 85.7 cm/sec  Ao mean PG: 3.4 mmHg  Ao V2 VTI: 25.3 cm  LUCERO(I,D): 3.7 cm2  LUCERO(V,D): 3.8 cm2  LV V1 max P.6 mmHg  LV V1 max: 118.7 cm/sec  LV V1 VTI: 22.3 cm  SV(LVOT): 93.5 ml  SI(LVOT): 58.9 ml/m2  PA acc time: 0.06 sec     AV Jonathan Ratio (DI): 0.92  LUCERO Index (cm2/m2): 2.3  E/E' av.1  Lateral E/e':  7.1  [facility] E/e': 11.1     ______________________________________________________________________________  Report approved by: Mary Aguilar 05/05/2022 09:01 AM

## 2022-05-05 NOTE — PLAN OF CARE
PRIMARY DIAGNOSIS: CHEST PAIN  OUTPATIENT/OBSERVATION GOALS TO BE MET BEFORE DISCHARGE:  1. Ruled out acute coronary syndrome (negative or stable Troponin):  Yes  2. Pain Status: Improved-controlled with oral pain medications.  3. Appropriate provocative testing performed: Yes  - Stress Test Procedure: Echo  - Interpretation of cardiac rhythm per telemetry tech: NSR    4. Cleared by Consultants (if applicable):No  5. Return to near baseline physical activity: Yes  Discharge Planner Nurse   Safe discharge environment identified: Yes  Barriers to discharge: Yes, coronary angiogram this afternoon       Entered by: Manasa Collins RN 05/05/2022 10:55 AM     Please review provider order for any additional goals.   Nurse to notify provider when observation goals have been met and patient is ready for discharge.  Goal Outcome Evaluation:

## 2022-05-05 NOTE — CONSULTS
"  Thank you,  No ref. provider found, for asking the [facility] team to see Ms. Kulwant Amin to evaluate       Assessment/Recommendations   Assessment/Plan:  1. Angina - new onset worse that in the past, neg trop, but with new WMA on echo concerning for severe CAD, presume neoatherosclerosis related to DM given LDL at target.   Keep npo, stop clpidogrel (has been off for 5 days),  Cont atorvastatin, asp and metoprolol.       Followed by Dr. Kramer in Cardiology     History of Present Illness/Subjective    Ms. Kulwant Amin is a 52 year old female with hx of DM, CAD s/p PCI to long segment in LAD in 2018 normal stress MRI in 2019, continue chest pain since no relief, trop  Neg, echo with nl EF iwht boaderline anti-spetal apical hyop;kinesis not noted in 2021 echo.  Nl Cr and CBC, pt describes exertional chest pressure with aid of napali , with no orhtopnea but PND intermittently, no syncopal events, edema, but BRBPR from hemorrhoids.  Discussed risks, benefits, goals and alternatives for angiography and possible PCI and she would like to proceed in discussion with pt and daughter in law.  In addition repeated those risks.benefits/alternatives with pt,  and nurse.          Physical Examination Review of Systems   /64   Pulse 86   Temp 98.2  F (36.8  C) (Oral)   Resp 18   Ht 1.575 m (5' 2\")   Wt 58.5 kg (129 lb)   SpO2 95%   BMI 23.59 kg/m    Body mass index is 23.59 kg/m .  Wt Readings from Last 3 Encounters:   05/04/22 58.5 kg (129 lb)   04/25/22 58.1 kg (128 lb)   04/15/22 58.1 kg (128 lb)     No intake or output data in the 24 hours ending 05/05/22 0918  General Appearance:   no distress, normal body habitus   ENT/Mouth: membranes moist, no oral lesions or bleeding gums.      EYES:  no scleral icterus, normal conjunctivae   Neck: no carotid bruits or thyromegaly   Chest/Lungs:   lungs are clear to auscultation, no rales or wheezing,  sternal scar, equal chest " wall expansion    Cardiovascular:   Regular. Normal first and second heart sounds with no murmurs, rubs, or gallops; the carotid, radial and posterior tibial pulses are intact, Jugular venous pressure , edema bilaterally    Abdomen:  no organomegaly, masses, bruits, or tenderness; bowel sounds are present   Extremities: no cyanosis or clubbing   Skin: no xanthelasma, warm.    Neurologic: normal  bilateral, no tremors     Psychiatric: alert and oriented x3, calm     Review of Systems - 12 points nega other than above      Medical History  Surgical History Family History Social History   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     Microcytic anemia 11/17/2019     S/P coronary artery stent placement 02/02/2018     TB lung, latent     9 mos INH    Past Surgical History:   Procedure Laterality Date     CORONARY STENT PLACEMENT  2018     CV CORONARY ANGIOGRAM N/A 1/25/2018    Procedure: Coronary Angiogram;  Surgeon: Angelo Serrano MD;  Location: [facility];  Service:      TN ESOPHAGOGASTRODUODENOSCOPY TRANSORAL DIAGNOSTIC N/A 12/10/2018    Procedure: ESOPHAGOGASTRODUODENOSCOPY (EGD);  Surgeon: Eddie Renteria MD;  Location: [facility];  Service: Gastroenterology     TN ESOPHAGOGASTRODUODENOSCOPY TRANSORAL DIAGNOSTIC N/A 12/3/2020    Procedure: ESOPHAGOGASTRODUODENOSCOPY (EGD) with biospies ;  Surgeon: Avi Crow MD;  Location: [facility];  Service:  Gastroenterology     [facility] COLONOSCOPY W/WO BRUSH/WASH N/A 12/10/2018    Procedure: COLONOSCOPY with polypectomy using biopsy forceps;  Surgeon: Eddie Renteria MD;  Location: [facility];  Service: Gastroenterology    Family History   Problem Relation Age of Onset     Other - See Comments Mother          of an intestinal problem     Ulcers Father          of gastritis     Breast Cancer No family hx of     Social History     Socioeconomic History     Marital status:      Spouse name: Not on file     Number of children: Not on file     Years of education: Not on file     Highest education level: Not on file   Occupational History     Not on file   Tobacco Use     Smoking status: Former Smoker     Packs/day: 1.00     Years: 30.00     Pack years: 30.00     Types: Cigarettes, Cigarettes, Cigarettes     Quit date: 2003     Years since quittin.3     Smokeless tobacco: Never Used     Tobacco comment: No passive exposure   Substance and Sexual Activity     Alcohol use: No     Drug use: No     Sexual activity: Yes     Partners: Male   Other Topics Concern     Parent/sibling w/ CABG, MI or angioplasty before 65F 55M? Not Asked   Social History Narrative    2017 The patient lives with her daughter-in-law (who is present), , son, and 2 grandchildren (total of 6 people). [place].     Social Determinants of Health     Financial Resource Strain: Not on file   Food Insecurity: Not on file   Transportation Needs: Not on file   Physical Activity: Not on file   Stress: Not on file   Social Connections: Not on file   Intimate Partner Violence: Not on file   Housing Stability: Not on file          Medications  Allergies   Scheduled Meds:    amitriptyline  20 mg Oral At Bedtime     aspirin  81 mg Oral Daily     atorvastatin  80 mg Oral QPM     famotidine  20 mg Oral BID     fluticasone-vilanterol  1 puff Inhalation Daily     gabapentin  600 mg Oral TID     hydrochlorothiazide  12.5 mg Oral Daily      insulin aspart  1-7 Units Subcutaneous TID AC     insulin aspart  1-5 Units Subcutaneous At Bedtime     insulin glargine  10 Units Subcutaneous QAM AC     insulin glargine  24 Units Subcutaneous At Bedtime     metoprolol tartrate  25 mg Oral BID     montelukast  10 mg Oral At Bedtime     pantoprazole  40 mg Oral Daily     sucralfate  1 g Oral 4x Daily AC & HS     umeclidinium  1 puff Inhalation Daily     Continuous Infusions:  PRN Meds:.acetaminophen **OR** acetaminophen, albuterol, calcium carbonate, cyclobenzaprine, glucose **OR** dextrose **OR** glucagon, guaiFENesin, melatonin, nitroGLYcerin, ondansetron **OR** ondansetron, prochlorperazine **OR** prochlorperazine **OR** prochlorperazine No Known Allergies      Lab Results    Chemistry/lipid CBC Cardiac Enzymes/BNP/TSH/INR   Lab Results   Component Value Date    CHOL 145 03/25/2022    HDL 52 03/25/2022    TRIG 197 (H) 03/25/2022    BUN 11 05/05/2022     05/05/2022    CO2 25 05/05/2022    Lab Results   Component Value Date    WBC 8.8 05/05/2022    HGB 12.5 05/05/2022    HCT 39.9 05/05/2022    MCV 84 05/05/2022     05/05/2022    Lab Results   Component Value Date    TROPONINI <0.01 05/04/2022    BNP <10 01/03/2022    TSH 0.76 01/14/2021    INR 0.98 05/27/2021              Angelo Serrano MD  Interventional Cardiology  [facility]

## 2022-05-05 NOTE — UTILIZATION REVIEW
"Admission Status; Secondary Review Determination         Under the authority of the Utilization Management Committee, the utilization review process indicated a secondary review on the above patient.  The review outcome is based on review of the medical records, discussions with staff, and applying clinical experience noted on the date of the review.          (x) Observation Status Appropriate - This patient does not meet hospital inpatient criteria and is placed in observation status. If this patient's primary payer is Medicare and was admitted as an inpatient, Condition Code 44 should be used and patient status changed to \"observation\".       RATIONALE FOR DETERMINATION:     52-year-old female with history of latent TB, asthma/COPD, hypertension, hyperthyroidism, anxiety, chronic abdominal pain, GERD, DM2, coronary artery disease, prior stroke and mood disorder presents with chest pain.  Troponins normal range.  Not felt to have a PE.  Cardiac cath performed and no disease noted requiring interventions.  GI also recommending outpatient follow-up.        The severity of illness, intensity of service provided, expected LOS and risk for adverse outcome make the care appropriate for further observation; however, doesn't meet criteria for hospital inpatient admission. Dr. Garcia notified of this determination.        The information on this document is developed by the utilization review team in order for the business office to ensure compliance.  This only denotes the appropriateness of proper admission status and does not reflect the quality of care rendered.         The definitions of Inpatient Status and Observation Status used in making the determination above are those provided in the CMS Coverage Manual, Chapter 1 and Chapter 6, section 70.4.        Sincerely,    Sulaiman Akins DO, [facility]  Utilization Review  Physician Advisor  [facility]  "

## 2022-05-05 NOTE — CONSULTS
[facility] Consult       Name: Kulwant Amin    Medical Record #: [id number]    YOB: 1970    Date/Time: 5/5/2022/3:00 PM    Reason for Consultation: Whit Garcia MD has asked me to evaluate Kulwant Amin regarding chest pain.    HPI: Pt is 53 yo Frisian femal with history latent TB, asthma/copd, htn, anxiety, chronic abdominal pain, gerd, dm, cad history of stent 2018, prior stroke, presents with 4-5 days exertional chest pain with radiation to left arm. She has had previous history gerd, epigastric pain, dysphagia for which she is s/p extensive [facility] evaluation. She feels her current chest pain is different from her history of previous gi symptoms. She denies recent dysphagia. She is on omeprazole and carafate as outpatient. She has had previous GI workup with EGD 12/2020 with antral erosions but otherwise unremarkable, normal esophagus biopsies, gastric biopsies neg for h. Pylori. EGD 2018 normal. Video swallow with speech with mild oropharyngeal dsyphagia. Esophagram 8/2020 questioned upper esophageal thickening (egd 12/2020 ordered for further eval with normal esophagus biopsies). Last colonoscopy 2018 with internal hemorrhoid and adenoma with recommendation for repeat in 5 years. Eval here notable for unremarkable CT chest/abd/pelvis. Cardiology is seeing her. Echo shows b/l anterior, septal, apical wall hypokinesis, they are  Planning angiogram possible PCI.         Patient Active Problem List   Diagnosis     Pulmonary nodule, right     Environmental allergies     TB lung, latent     COPD (chronic obstructive pulmonary disease)/Asthma      HTN (hypertension)     Hyperthyroidism     Cataracts, bilateral     Corneal opacity     Irregular astigmatism     Anxiety     Chronic nonintractable headache, unspecified headache type     Coronary artery disease due to lipid rich plaque     Dizziness     Hyperlipidemia     Moderate major depression (H)     Moderate persistent asthma     Type 2  diabetes mellitus treated without insulin (H)     Unspecified visual loss     GERD (gastroesophageal reflux disease)     Dental caries     H/O arterial ischemic stroke     Constipation     Dysphagia     Chronic abdominal pain     Insomnia     FLACO (obstructive sleep apnea)- mild (AHI 14)     Chest pain     Chest pain, unspecified type          Review of Systems (ROS): Pertinent items are noted in HPI.    Past Medical History:  Past Medical History:   Diagnosis Date     Acute asthma exacerbation 2020     Anxiety      Arthritis      Asthma exacerbation 2015     Chronic obstructive pulmonary disease, unspecified COPD type (H)      COPD (chronic obstructive pulmonary disease) (H)      Coronary artery disease due to lipid rich plaque      Depression      Epigastric pain 12/15/2021     Essential hypertension      GERD (gastroesophageal reflux disease)      Infection due to  novel coronavirus 2022    positive with COVID-19 on January 3, 2022     Latent tuberculosis 2019     Microcytic anemia 2019     S/P coronary artery stent placement 2018     TB lung, latent     9 mos INH       Medications:   No current outpatient medications on file.       Allergies: Patient has no known allergies.    Family History:  Family History   Problem Relation Age of Onset     Other - See Comments Mother          of an intestinal problem     Ulcers Father          of gastritis     Breast Cancer No family hx of        Social History:  Social History     Socioeconomic History     Marital status:      Spouse name: Not on file     Number of children: Not on file     Years of education: Not on file     Highest education level: Not on file   Occupational History     Not on file   Tobacco Use     Smoking status: Former Smoker     Packs/day: 1.00     Years: 30.00     Pack years: 30.00     Types: Cigarettes, Cigarettes, Cigarettes     Quit date: 2003     Years since quittin.3     Smokeless  "tobacco: Never Used     Tobacco comment: No passive exposure   Substance and Sexual Activity     Alcohol use: No     Drug use: No     Sexual activity: Yes     Partners: Male   Other Topics Concern     Parent/sibling w/ CABG, MI or angioplasty before 65F 55M? Not Asked   Social History Narrative    12/22/2017 The patient lives with her daughter-in-law (who is present), , son, and 2 grandchildren (total of 6 people). [place].     Social Determinants of Health     Financial Resource Strain: Not on file   Food Insecurity: Not on file   Transportation Needs: Not on file   Physical Activity: Not on file   Stress: Not on file   Social Connections: Not on file   Intimate Partner Violence: Not on file   Housing Stability: Not on file       PHYSICAL EXAMINATION:  /64   Pulse 92   Temp 98.5  F (36.9  C) (Oral)   Resp 20   Ht 1.575 m (5' 2\")   Wt 58.5 kg (129 lb)   SpO2 95%   BMI 23.59 kg/m   Body mass index is 23.59 kg/m .  General:  NAD  HEENT: Sclera non-icteric.  Moist mucous membranes. Oropharynx clear.   Lymph: No cervical lymphadenopathy.  Pulm: Lungs clear to ausculation bilaterally.  Cardio: Regular rate and rhythm   Gastrointestinal: +bs, soft, nt  Musculoskeletal: Extremities are warm, no lower extremity edema.  Neuro: Alert and oriented.  No gross focal abnormalities.  Psych: Mood and affect normal.  Skin: No suspicious lesions or rashes.    I have reviewed recent laboratory studies:    LABORATORY DATA:  Recent Labs   Lab 05/05/22  0737 05/04/22  0908   WBC 8.8 7.6   RBC 4.75 4.87   HGB 12.5 12.7   HCT 39.9 40.9   MCV 84 84   MCH 26.3* 26.1*   MCHC 31.3* 31.1*   RDW 13.9 13.8    217      Recent Labs   Lab 05/05/22  0737 05/04/22  0908    139   CO2 25 22   BUN 11 7*     Recent Labs   Lab 05/05/22  0737 05/04/22  0908   ALKPHOS 89 94   AST 17 18   ALT 14 15     Lab Results   Component Value Date    INR 0.98 05/27/2021    INR 0.99 02/11/2021    INR 0.96 01/06/2020       Radiology: " Normal vision: sees adequately in most situations; can see medication labels, newsprint   Narrative & Impression   EXAM: CT CHEST PULMONARY EMBOLISM W CONTRAST  LOCATION: [facility]  DATE/TIME: 5/4/2022 10:34 AM     INDICATION: chest pain  COMPARISON: CT abdomen and pelvis the same date, CT chest exams 12/15/2021 and 8/3/2021  TECHNIQUE: CT chest pulmonary angiogram during arterial phase injection of IV contrast. Multiplanar reformats and MIP reconstructions were performed. Dose reduction techniques were used.   CONTRAST: IsoVue 370 100mL     FINDINGS:  ANGIOGRAM CHEST: Pulmonary arteries are normal caliber and negative for pulmonary emboli. Thoracic aorta is negative for dissection. No CT evidence of right heart strain.     LUNGS AND PLEURA: The central airways are clear. Mild dependent atelectasis. Stable mild apical and bibasilar scarring. Mild mosaic lung attenuation suggesting mild air trapping. Incidental upper right major fissural intrapulmonary lymph node image 58   series 6.     MEDIASTINUM/AXILLAE: No thoracic adenopathy. Normal heart size and no pericardial effusion.     CORONARY ARTERY CALCIFICATION: Previous intervention (stents or CABG).     UPPER ABDOMEN: Normal.     MUSCULOSKELETAL: Normal.                                                                      IMPRESSION:  1.  No pulmonary artery embolism or thoracic aortic dissection.  2.  No pneumonic consolidation or pleural effusion.     EXAM: CT ABDOMEN PELVIS W CONTRAST  LOCATION: [facility]  DATE/TIME: 5/4/2022 10:36 AM     INDICATION: Nausea and vomiting.  COMPARISON: 09/22/2019  TECHNIQUE: CT scan of the abdomen and pelvis was performed following injection of IV contrast. Multiplanar reformats were obtained. Dose reduction techniques were used.  CONTRAST: IsoVue 370 100mL     FINDINGS:   LOWER CHEST: Please refer to chest CTA report.     HEPATOBILIARY: Normal.     PANCREAS: Normal.     SPLEEN: Normal.     ADRENAL GLANDS: Normal.     KIDNEYS/BLADDER: Normal.     BOWEL: Bowel is  normal caliber. No obstruction. Incidental descending duodenal diverticulum. No free fluid or inflammatory changes. Appendix is normal.     LYMPH NODES: Normal.     VASCULATURE: Moderate arterial calcifications.     PELVIC ORGANS: Normal.     MUSCULOSKELETAL: Diastases of  the midline rectus sheath throughout. No suspicious bone lesions.                                                                      IMPRESSION:   1.  No abnormalities are seen to explain vomiting. Specifically, no bowel obstruction or inflammatory changes    Impression:   1. Chest pain - exertional with radiation to left arm, undergoing cardiac eval; patient does not feel this is similar to previous GI pain and history is not consistent with GI etiology; she has had extensive GI evaluation in past with previous egd x2, video swallow, esophagram. She denies dysphagia or association of pain with PO.     Recommendation:   1. Cardiac eval as per cardiology  2. Continue home regimen of omeprazole, carafate for gerd/dyspepsia  3. [facility] clinic follow up following cardiac eval  Will sign off. Call with questions.     Approximately 60 minutes of total time was spent providing patient care, including patient evaluation, reviewing documentation/tests, and .    Dasha Cates MD  5/5/2022/3:00 PM  [facility]

## 2022-05-05 NOTE — PROGRESS NOTES
Patient was kept comfortable during post-procedure stay.VSS. C/o chest pain & rates it at 4/10 upon arrival from the cathlab. Dr. Cano made aware and ok'd giving one dose of nitroglycerin SL which eventually lowered down the chest pain. After about an hour and a half, patient c/o headache 7/10. Tylenol 650 mg PO given. Headache has not change yet as of the time of transfer to the floor @ 1640. Right radial access site remains dry & free from signs of bleeding even after TR band removal. Dr. Cano was able to speak to daughter post procedure. Transferred to P3 in stable condition. Daughter at bedside. Report given to Manasa CHAVEZ.

## 2022-05-05 NOTE — H&P
[facility] [place] [facility] Admission History and Physical    Kulwant Amin  [address] [facility] [address]  : 1970  Admission Date/Time: 2022  8:37 AM    Primary Care Provider / Referring Physician: Adrien Cardoza  [facility] Attending Physician:  Misha Seals DO     Assessment:     Principal Problem:    Chest pain  Active Problems:    TB lung, latent    HTN (hypertension)    Hyperthyroidism    Anxiety    Coronary artery disease due to lipid rich plaque    Hyperlipidemia    Moderate major depression (H)    Moderate persistent asthma    Type 2 diabetes mellitus treated without insulin (H)    GERD (gastroesophageal reflux disease)    H/O arterial ischemic stroke    Chronic abdominal pain    FLACO (obstructive sleep apnea)- mild (AHI 14)      Plan:    52-year-old female with history of latent TB, asthma/COPD, hypertension, hyperthyroidism, anxiety, chronic abdominal pain, GERD, DM2, coronary artery disease, prior stroke and mood disorder presents with chest pain      Chest pain: Noted is history of CAD and reactive airways disease.  Patient presents with exertional chest pain for 4 days.  States that she ran out of her Plavix 4 days ago.   EKG is negative for ACS, tachycardia is noted.  CTA of the chest is negative for PE or dissection and lungs are otherwise clear.  Troponin negative x2, COVID-negative, lipase normal, LFTs unremarkable  Noted is negative stress test in 2019 with normal TTE in .  Holter monitor evaluation also normal in 2019.  Noted history of CAD with prior stent placement 2018 which did not result in any improvement in her symptoms. Most recent cardiology note from 2022 notes that they will be stopping her Plavix given she is >3 years out from prior intervention.  -- Trend troponin, monitor on telemetry  -- Check TTE in the a.m.  -- Give GI cocktail and see if this helps  -- Continue home aspirin, statin, NTG, metoprolol  -- Consider GI versus cardiology consultation in the  a.m.  -- Continue home famotidine, PPI and sucralfate      Nausea and vomiting: Patient presents with 1 episode of nausea vomiting prior to presentation.  Denies dysphagia or regurgitation of undigested food.  Of note WBC normal, hepatic function normal, lipase normal, CT of the abdomen and pelvis is unrevealing  --Supportive cares  -- Continue home PPI, sucralfate and famotidine  -- Consider GI consultation for EGD if no evidence of ACS, telemetry and TTE are unrevealing      History of COPD/asthma:Not hypoxic, CTA of the chest clear  -- continue home inhalers and singulair  -- Of note patient receives Dupixent dosing every 2 weeks      DM2:   -- Hold metformin while inpatient   -- continue home insulin basal glargine management and add sliding scale insulin with meals      HTN: continue home hydrochlorothiazide and metoprolol       Chronic pain, mood disorder: Continue home Elavil, Flexeril, gabapentin      History of prior stroke: Continue home aspirin and statin      History of hyperthyroidism: Patient not on any chronic therapy for this, last TSH checked January 2021 was normal.      DVT PPX: SCD    Code status:  Full Code    Misha Seals D.O.          _____________________________________________________________  CHIEF COMPLAINT:    Chest pain    HPI:  52-year-old female with history of latent TB, asthma/COPD, hypertension, hyperthyroidism, anxiety, chronic abdominal pain, GERD, DM2, coronary artery disease, prior stroke and mood disorder presents with chest pain.  Patient notes chest pain starting a few days ago that is worse with ambulation but also present at rest.  It does improve with nitroglycerin.  Pain is located substernally and radiates to her left arm.  She denies any shortness of breath but did have episode of nausea and vomiting prior to presentation.  She has some chronic cough that is no worse than previous and consistent with her diagnosis of reactive airway disease.  Patient states she  ran out of Plavix 4 days ago  Remainder of ROS negative. Denies any other exacerbating or improving factors.      ALLERGIES/SENSITIVITIES: No Known Allergies    (Not in a hospital admission)      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     Microcytic anemia 11/17/2019     S/P coronary artery stent placement 02/02/2018     TB lung, latent     9 mos INH       Past Surgical History:   Procedure Laterality Date     CORONARY STENT PLACEMENT  2018     CV CORONARY ANGIOGRAM N/A 1/25/2018    Procedure: Coronary Angiogram;  Surgeon: Angelo Serrano MD;  Location: [facility];  Service:      CO ESOPHAGOGASTRODUODENOSCOPY TRANSORAL DIAGNOSTIC N/A 12/10/2018    Procedure: ESOPHAGOGASTRODUODENOSCOPY (EGD);  Surgeon: Eddie Renteria MD;  Location: [facility];  Service: Gastroenterology     CO ESOPHAGOGASTRODUODENOSCOPY TRANSORAL DIAGNOSTIC N/A 12/3/2020    Procedure: ESOPHAGOGASTRODUODENOSCOPY (EGD) with biospies ;  Surgeon: Avi Crow MD;  Location: [facility];  Service: Gastroenterology     [facility] COLONOSCOPY W/WO BRUSH/WASH N/A 12/10/2018    Procedure: COLONOSCOPY with polypectomy using biopsy forceps;  Surgeon: Eddie Renteria MD;  Location: [facility];  Service: Gastroenterology       REVIEW OF SYSTEMS:   Constitutional: no fever, chills, or sweats  Eyes: No visual disturbance or irritation  ENT: No nose or throat congestion or pain  Respiratory: No wheezes, shortness of  breath, or pain with breathing  Cardiovascular: No chest pain or palpitations  Gastrointestinal: As above, no abdominal pain  Genitourinary: No urgency, frequency, or dysuria  Integument/breast: No rash, pruritis, or lesion  Hematologic/lymphatic: No bleeding or unusual bruising  Musculoskeletal: No joint swelling, pain, or unusual back pain  Neurological: No headache, arm or leg numbness or weakness, dizziness, or gait disturbance  Psychiatric: No anxiety, or depression, or hallucinations  Endocrine: No unusual fatigue, appetite disturbance, sleep disturbance, or unusual weight loss or gain  Allergic/Immunologic: No hives, allergic swelling or wheeze or rhinitis  All other systems on reveiw are negative.    Social History     Socioeconomic History     Marital status:      Spouse name: Not on file     Number of children: Not on file     Years of education: Not on file     Highest education level: Not on file   Occupational History     Not on file   Tobacco Use     Smoking status: Former Smoker     Packs/day: 1.00     Years: 30.00     Pack years: 30.00     Types: Cigarettes, Cigarettes, Cigarettes     Quit date: 2003     Years since quittin.3     Smokeless tobacco: Never Used     Tobacco comment: No passive exposure   Substance and Sexual Activity     Alcohol use: No     Drug use: No     Sexual activity: Yes     Partners: Male   Other Topics Concern     Parent/sibling w/ CABG, MI or angioplasty before 65F 55M? Not Asked   Social History Narrative    2017 The patient lives with her daughter-in-law (who is present), , son, and 2 grandchildren (total of 6 people). [place].     Social Determinants of Health     Financial Resource Strain: Not on file   Food Insecurity: Not on file   Transportation Needs: Not on file   Physical Activity: Not on file   Stress: Not on file   Social Connections: Not on file   Intimate Partner Violence: Not on file   Housing Stability: Not on file          Family  "History   Problem Relation Age of Onset     Other - See Comments Mother          of an intestinal problem     Ulcers Father          of gastritis     Breast Cancer No family hx of        PHYSICAL EXAM:  General Appearance: In no acute distress  /69   Pulse 108   Temp 98.2  F (36.8  C) (Oral)   Resp 22   Ht 1.575 m (5' 2\")   Wt 58.5 kg (129 lb)   SpO2 97%   BMI 23.59 kg/m    EYES: Clear, without inflammation   HEENT: Without congestion or inflammation  CHEST: no focal tenderness   RESPIRATORY: Respirations nonlabored  CARDIOVASCULAR: No le edema bilat.  ABDOMEN: soft and non-tender  RECTAL: deferred  GENITOURINARY: deferred  NEUROLOGIC: No focal arm or leg  weakness, speech is clear      Labs Reviewed:   Recent Results (from the past 24 hour(s))   CBC (+ platelets, no diff)    Collection Time: 22  9:08 AM   Result Value Ref Range    WBC Count 7.6 4.0 - 11.0 10e3/uL    RBC Count 4.87 3.80 - 5.20 10e6/uL    Hemoglobin 12.7 11.7 - 15.7 g/dL    Hematocrit 40.9 35.0 - 47.0 %    MCV 84 78 - 100 fL    MCH 26.1 (L) 26.5 - 33.0 pg    MCHC 31.1 (L) 31.5 - 36.5 g/dL    RDW 13.8 10.0 - 15.0 %    Platelet Count 217 150 - 450 10e3/uL   Basic metabolic panel    Collection Time: 22  9:08 AM   Result Value Ref Range    Sodium 139 136 - 145 mmol/L    Potassium 3.8 3.5 - 5.0 mmol/L    Chloride 106 98 - 107 mmol/L    Carbon Dioxide (CO2) 22 22 - 31 mmol/L    Anion Gap 11 5 - 18 mmol/L    Urea Nitrogen 7 (L) 8 - 22 mg/dL    Creatinine 0.65 0.60 - 1.10 mg/dL    Calcium 9.3 8.5 - 10.5 mg/dL    Glucose 192 (H) 70 - 125 mg/dL    GFR Estimate >90 >60 mL/min/1.73m2   Troponin I (now)    Collection Time: 22  9:08 AM   Result Value Ref Range    Troponin I <0.01 0.00 - 0.29 ng/mL   Hepatic function panel    Collection Time: 22  9:08 AM   Result Value Ref Range    Bilirubin Total 0.4 0.0 - 1.0 mg/dL    Bilirubin Direct 0.1 <=0.5 mg/dL    Protein Total 6.9 6.0 - 8.0 g/dL    Albumin 3.5 3.5 - 5.0 g/dL    " Alkaline Phosphatase 94 45 - 120 U/L    AST 18 0 - 40 U/L    ALT 15 0 - 45 U/L   Lipase    Collection Time: 05/04/22  9:08 AM   Result Value Ref Range    Lipase 31 0 - 52 U/L   Extra Red Top Tube    Collection Time: 05/04/22  9:08 AM   Result Value Ref Range    Hold Specimen JIC    Extra Green Top (Lithium Heparin) Tube    Collection Time: 05/04/22  9:08 AM   Result Value Ref Range    Hold Specimen JIC    Extra Purple Top Tube    Collection Time: 05/04/22  9:08 AM   Result Value Ref Range    Hold Specimen JIC    Symptomatic; Unknown Influenza A/B & SARS-CoV2 (COVID-19) Virus PCR Multiplex Nasopharyngeal    Collection Time: 05/04/22  9:19 AM    Specimen: Nasopharyngeal; Swab   Result Value Ref Range    Influenza A PCR Negative Negative    Influenza B PCR Negative Negative    SARS CoV2 PCR Negative Negative   Troponin I (now)    Collection Time: 05/04/22  2:35 PM   Result Value Ref Range    Troponin I <0.01 0.00 - 0.29 ng/mL

## 2022-05-05 NOTE — DISCHARGE INSTRUCTIONS
Interventional Cardiology  Coronary Angiogram/Angioplasty/Stent/Atherectomy Discharge  Instructions - Radial (wrist) Approach     The instructions below are to help you understand how to take care of yourself. There is also information about when to call the doctor or emergency services.    **Do not stop your aspirin or platelet inhibitor unless directed by your Cardiologist.  These medications help to prevent platelets in your blood from sticking together and forming a clot.   Examples of these medications are: Ticagrelor (Brilinta), Clopidogrel (Plavix), Prasugrel (Effient)    For 24 hours after procedure:  Do not subject hand/arm to any forceful movements for 24 hours, such as supporting weight when rising from a chair or bed.  Do not drive a car for 24 hours.  The dressing on the puncture site may be removed after 24 hours and left open to air. If minor oozing, you may apply a Band-Aid and remove after 12 hours.   You may shower on the day after your procedure. Do not take a tub bath or wash dishes (no soaking wrist) with the puncture site in water for 3 days after the procedure.    For 48 hours following the procedure:  Do not operate a lawnmower, motorcycle, chainsaw or all-terrain vehicle.  Do not lift anything heavier than 5-10 pounds with affected arm for 5 days.  Avoid excessive bending (flexion/extension) wrist movement.  Do not engage in vigorous exercise (i.e. tennis, golf) using the affected arm for 5 days after discharge.  You may return to work in 72 hours if no complications and no heavy lifting.    If bleeding should occur following discharge:  Sit down and apply firm pressure with your thumb against the puncture site and fingers against back of wrist for 10 minutes.  If the bleeding stops, continue to rest, keeping your wrist still for 2 hours. Notify your doctor as soon as possible.  If bleeding does not stop after 10 minutes or if there is a large amount of bleeding or spurting, call 911  immediately. Do not drive yourself to the hospital.           Contact the [facility] Clinic at [phone] if you develop:  Fever over 100.4, that lasts more than one day  Redness, heat, or pus at the puncture site  Change in color or temperature of the hand or arm    Expect mild tingling of hand and tenderness at the puncture site for up to 3 days. You may take Tylenol or a pain medicine recommended by your doctor.       Your Procedural Physician was: Dr. Irwin Cano  the phone number is: [phone]  [facility]:  [phone]  If you are calling after hours, please listen to the entire voicemail, a live  will answer at the end of the message

## 2022-05-05 NOTE — PRE-PROCEDURE
GENERAL PRE-PROCEDURE:   Procedure:  Coronary angiogram with possible PCI  Date/Time:  5/5/2022 11:02 AM    Written consent obtained?: Yes    Risks and benefits: Risks, benefits and alternatives were discussed    Consent given by:  Patient (The patient is Pashto speaking and her consent is obtained with the assistance of a Pashto )  Patient states understanding of procedure being performed: Yes    Patient's understanding of procedure matches consent: Yes    Procedure consent matches procedure scheduled: Yes    Expected level of sedation:  Moderate  Appropriately NPO:  Yes  ASA Class:  3 (Chest pain, CAD; s/p PCI, DM Type II)  Mallampati  :  Grade 3- soft palate visible, posterior pharyngeal wall not visible  Lungs:  Lungs clear with good breath sounds bilaterally  Heart:  Normal heart sounds and rate  History & Physical reviewed:  History and physical reviewed and no updates needed  Statement of review:  I have reviewed the lab findings, diagnostic data, medications, and the plan for sedation

## 2022-05-06 DIAGNOSIS — I25.10 CORONARY ARTERY DISEASE DUE TO LIPID RICH PLAQUE: Primary | ICD-10-CM

## 2022-05-06 DIAGNOSIS — I10 HTN (HYPERTENSION): ICD-10-CM

## 2022-05-06 DIAGNOSIS — I25.83 CORONARY ARTERY DISEASE DUE TO LIPID RICH PLAQUE: Primary | ICD-10-CM

## 2022-05-06 DIAGNOSIS — R07.9 CHEST PAIN: ICD-10-CM

## 2022-05-06 LAB
ANION GAP SERPL CALCULATED.3IONS-SCNC: 9 MMOL/L (ref 5–18)
BUN SERPL-MCNC: 10 MG/DL (ref 8–22)
CALCIUM SERPL-MCNC: 9 MG/DL (ref 8.5–10.5)
CHLORIDE BLD-SCNC: 108 MMOL/L (ref 98–107)
CO2 SERPL-SCNC: 25 MMOL/L (ref 22–31)
CREAT SERPL-MCNC: 0.63 MG/DL (ref 0.6–1.1)
ERYTHROCYTE [DISTWIDTH] IN BLOOD BY AUTOMATED COUNT: 13.7 % (ref 10–15)
GFR SERPL CREATININE-BSD FRML MDRD: >90 ML/MIN/1.73M2
GLUCOSE BLD-MCNC: 166 MG/DL (ref 70–125)
GLUCOSE BLDC GLUCOMTR-MCNC: 157 MG/DL (ref 70–99)
GLUCOSE BLDC GLUCOMTR-MCNC: 321 MG/DL (ref 70–99)
GLUCOSE BLDC GLUCOMTR-MCNC: 345 MG/DL (ref 70–99)
GLUCOSE BLDC GLUCOMTR-MCNC: 360 MG/DL (ref 70–99)
HCT VFR BLD AUTO: 39.2 % (ref 35–47)
HGB BLD-MCNC: 12.2 G/DL (ref 11.7–15.7)
MAGNESIUM SERPL-MCNC: 1.9 MG/DL (ref 1.8–2.6)
MCH RBC QN AUTO: 26.3 PG (ref 26.5–33)
MCHC RBC AUTO-ENTMCNC: 31.1 G/DL (ref 31.5–36.5)
MCV RBC AUTO: 85 FL (ref 78–100)
PLATELET # BLD AUTO: 208 10E3/UL (ref 150–450)
POTASSIUM BLD-SCNC: 3.5 MMOL/L (ref 3.5–5)
RBC # BLD AUTO: 4.64 10E6/UL (ref 3.8–5.2)
SODIUM SERPL-SCNC: 142 MMOL/L (ref 136–145)
T4 FREE SERPL-MCNC: 1.02 NG/DL (ref 0.7–1.8)
TSH SERPL DL<=0.005 MIU/L-ACNC: 0.18 UIU/ML (ref 0.3–5)
WBC # BLD AUTO: 8.5 10E3/UL (ref 4–11)

## 2022-05-06 PROCEDURE — 258N000003 HC RX IP 258 OP 636: Performed by: INTERNAL MEDICINE

## 2022-05-06 PROCEDURE — 36415 COLL VENOUS BLD VENIPUNCTURE: CPT | Performed by: INTERNAL MEDICINE

## 2022-05-06 PROCEDURE — 250N000013 HC RX MED GY IP 250 OP 250 PS 637: Performed by: INTERNAL MEDICINE

## 2022-05-06 PROCEDURE — 84443 ASSAY THYROID STIM HORMONE: CPT | Performed by: INTERNAL MEDICINE

## 2022-05-06 PROCEDURE — 82310 ASSAY OF CALCIUM: CPT | Performed by: INTERNAL MEDICINE

## 2022-05-06 PROCEDURE — 250N000009 HC RX 250: Performed by: PAIN MEDICINE

## 2022-05-06 PROCEDURE — 85027 COMPLETE CBC AUTOMATED: CPT | Performed by: INTERNAL MEDICINE

## 2022-05-06 PROCEDURE — 250N000011 HC RX IP 250 OP 636: Performed by: INTERNAL MEDICINE

## 2022-05-06 PROCEDURE — 83735 ASSAY OF MAGNESIUM: CPT | Performed by: INTERNAL MEDICINE

## 2022-05-06 PROCEDURE — 99214 OFFICE O/P EST MOD 30 MIN: CPT | Performed by: INTERNAL MEDICINE

## 2022-05-06 PROCEDURE — 84439 ASSAY OF FREE THYROXINE: CPT | Performed by: INTERNAL MEDICINE

## 2022-05-06 PROCEDURE — 250N000013 HC RX MED GY IP 250 OP 250 PS 637: Performed by: PAIN MEDICINE

## 2022-05-06 PROCEDURE — 82962 GLUCOSE BLOOD TEST: CPT

## 2022-05-06 PROCEDURE — 96372 THER/PROPH/DIAG INJ SC/IM: CPT

## 2022-05-06 PROCEDURE — G0378 HOSPITAL OBSERVATION PER HR: HCPCS

## 2022-05-06 PROCEDURE — 99207 PR NO CHARGE LOS: CPT | Performed by: INTERNAL MEDICINE

## 2022-05-06 PROCEDURE — 250N000009 HC RX 250: Performed by: INTERNAL MEDICINE

## 2022-05-06 PROCEDURE — 96372 THER/PROPH/DIAG INJ SC/IM: CPT | Performed by: INTERNAL MEDICINE

## 2022-05-06 RX ORDER — HEPARIN SODIUM 5000 [USP'U]/.5ML
5000 INJECTION, SOLUTION INTRAVENOUS; SUBCUTANEOUS EVERY 12 HOURS
Status: DISCONTINUED | OUTPATIENT
Start: 2022-05-06 | End: 2022-05-08 | Stop reason: HOSPADM

## 2022-05-06 RX ORDER — BISACODYL 10 MG
10 SUPPOSITORY, RECTAL RECTAL DAILY PRN
Status: DISCONTINUED | OUTPATIENT
Start: 2022-05-06 | End: 2022-05-08 | Stop reason: HOSPADM

## 2022-05-06 RX ORDER — LIDOCAINE 50 MG/G
OINTMENT TOPICAL 3 TIMES DAILY
Status: DISCONTINUED | OUTPATIENT
Start: 2022-05-06 | End: 2022-05-08 | Stop reason: HOSPADM

## 2022-05-06 RX ORDER — BISACODYL 10 MG
10 SUPPOSITORY, RECTAL RECTAL ONCE
Status: COMPLETED | OUTPATIENT
Start: 2022-05-06 | End: 2022-05-06

## 2022-05-06 RX ORDER — AMOXICILLIN 250 MG
1 CAPSULE ORAL 2 TIMES DAILY
Status: DISCONTINUED | OUTPATIENT
Start: 2022-05-06 | End: 2022-05-08 | Stop reason: HOSPADM

## 2022-05-06 RX ORDER — POLYETHYLENE GLYCOL 3350 17 G/17G
17 POWDER, FOR SOLUTION ORAL DAILY PRN
Status: DISCONTINUED | OUTPATIENT
Start: 2022-05-06 | End: 2022-05-08 | Stop reason: HOSPADM

## 2022-05-06 RX ORDER — ACETAMINOPHEN 325 MG/1
975 TABLET ORAL 3 TIMES DAILY
Status: DISCONTINUED | OUTPATIENT
Start: 2022-05-06 | End: 2022-05-08 | Stop reason: HOSPADM

## 2022-05-06 RX ADMIN — GABAPENTIN 600 MG: 300 CAPSULE ORAL at 13:40

## 2022-05-06 RX ADMIN — SENNOSIDES AND DOCUSATE SODIUM 1 TABLET: 8.6; 5 TABLET ORAL at 09:48

## 2022-05-06 RX ADMIN — ALBUTEROL SULFATE 2.5 MG: 2.5 SOLUTION RESPIRATORY (INHALATION) at 17:11

## 2022-05-06 RX ADMIN — PANTOPRAZOLE SODIUM 40 MG: 40 TABLET, DELAYED RELEASE ORAL at 08:56

## 2022-05-06 RX ADMIN — FAMOTIDINE 20 MG: 20 TABLET ORAL at 08:56

## 2022-05-06 RX ADMIN — BISACODYL 10 MG: 10 SUPPOSITORY RECTAL at 09:49

## 2022-05-06 RX ADMIN — GABAPENTIN 600 MG: 300 CAPSULE ORAL at 08:56

## 2022-05-06 RX ADMIN — SODIUM CHLORIDE: 9 INJECTION, SOLUTION INTRAVENOUS at 22:22

## 2022-05-06 RX ADMIN — SODIUM CHLORIDE: 9 INJECTION, SOLUTION INTRAVENOUS at 05:09

## 2022-05-06 RX ADMIN — ASPIRIN 81 MG: 81 TABLET, COATED ORAL at 08:56

## 2022-05-06 RX ADMIN — HEPARIN SODIUM 5000 UNITS: 10000 INJECTION, SOLUTION INTRAVENOUS; SUBCUTANEOUS at 20:54

## 2022-05-06 RX ADMIN — FLUTICASONE FUROATE AND VILANTEROL TRIFENATATE 1 PUFF: 200; 25 POWDER RESPIRATORY (INHALATION) at 08:56

## 2022-05-06 RX ADMIN — SUCRALFATE 1 G: 1 TABLET ORAL at 12:26

## 2022-05-06 RX ADMIN — AMITRIPTYLINE HYDROCHLORIDE 20 MG: 10 TABLET, FILM COATED ORAL at 22:06

## 2022-05-06 RX ADMIN — SUCRALFATE 1 G: 1 TABLET ORAL at 22:05

## 2022-05-06 RX ADMIN — LORATADINE 10 MG: 10 TABLET ORAL at 08:56

## 2022-05-06 RX ADMIN — MONTELUKAST 10 MG: 10 TABLET, FILM COATED ORAL at 22:06

## 2022-05-06 RX ADMIN — HYDROCHLOROTHIAZIDE 12.5 MG: 12.5 TABLET ORAL at 08:56

## 2022-05-06 RX ADMIN — FAMOTIDINE 20 MG: 20 TABLET ORAL at 20:49

## 2022-05-06 RX ADMIN — METOPROLOL TARTRATE 25 MG: 25 TABLET, FILM COATED ORAL at 20:49

## 2022-05-06 RX ADMIN — ATORVASTATIN CALCIUM 80 MG: 40 TABLET, FILM COATED ORAL at 20:45

## 2022-05-06 RX ADMIN — ACETAMINOPHEN 975 MG: 325 TABLET ORAL at 13:39

## 2022-05-06 RX ADMIN — ACETAMINOPHEN 975 MG: 325 TABLET ORAL at 20:50

## 2022-05-06 RX ADMIN — ACETAMINOPHEN 650 MG: 325 TABLET ORAL at 09:21

## 2022-05-06 RX ADMIN — GABAPENTIN 600 MG: 300 CAPSULE ORAL at 20:44

## 2022-05-06 RX ADMIN — ALBUTEROL SULFATE 2 PUFF: 90 AEROSOL, METERED RESPIRATORY (INHALATION) at 22:36

## 2022-05-06 RX ADMIN — SENNOSIDES AND DOCUSATE SODIUM 1 TABLET: 8.6; 5 TABLET ORAL at 20:49

## 2022-05-06 RX ADMIN — Medication 3 MG: at 22:15

## 2022-05-06 RX ADMIN — UMECLIDINIUM 1 PUFF: 62.5 AEROSOL, POWDER ORAL at 08:59

## 2022-05-06 RX ADMIN — SUCRALFATE 1 G: 1 TABLET ORAL at 17:10

## 2022-05-06 RX ADMIN — LIDOCAINE: 50 OINTMENT TOPICAL at 20:51

## 2022-05-06 RX ADMIN — CYCLOBENZAPRINE 5 MG: 5 TABLET, FILM COATED ORAL at 18:32

## 2022-05-06 RX ADMIN — SUCRALFATE 1 G: 1 TABLET ORAL at 08:56

## 2022-05-06 NOTE — CONSULTS
"DIABETES CARE  Consulted by Provider for Diabetes Education    52 year old female with type 2 diabetes. Patient was admitted for chest pains.   Related Co-morbidities include: HTN, FLACO, HLD, COPD    PCP: Adrien Cardoza MD  Social: Lives in home with spouse and adult children    Nutrition & Diabetes History: History of type 2 with insulin needed.     Meds for BG Management PTA:  -Lantus 10 units once daily.  Novolog sliding scale medium  -Metformin 1000 mg bid      Current Inpatient Meds for BG Management:  -Novolog 1:15 meals  Novolog medium correction 1 drops 50mg/dl  -Lantus 10 units in am and 24 unit in pm    Labs:  Hemoglobin A1C:10.9 in March            Hgb:12.2 SCr:0.63    GFR:>90   BGs:    Latest Reference Range & Units 05/05/22 08:37 05/05/22 12:11 05/05/22 14:35 05/05/22 17:02 05/05/22 20:31 05/06/22 04:26 05/06/22 08:24   Glucose 70 - 125 mg/dL      166 (H)    GLUCOSE BY METER POCT 70 - 99 mg/dL 210 (H) 153 (H) 153 (H) 434 (H) 308 (H)  157 (H)   (H): Data is abnormally high  Diet Order:75 gram CHO Intake: 75%   Weight: 55.9kg  BMI: 22.55    DM EDUCATION/COUNSELING:  Barriers to Learning and/or DM Self-Management: Language  Previous DM Education:    Yes  Current Education and/or visit with Patient and/or caregiver(s):  Reviewed diabetes disease with patient . Discussed current blood sugars/A1C and target/goal numbers. Reviewed importance of checking blood glucose levels and keeping log and/or bringing meter to all f/u visits.   Discussed diet and exercise as important components to good BG control. Briefly reviewed diabetic diet including what foods have cho and portions.She asked about carbohydrates and diet.     Reviewed insulin plan for current hospitalization and gave her chart with insulin at meals plus Lantus twice daily.  She can fill in amounts from discharge orders.       (See also \"Diabetic Ed Flowsheet\"  Or Education tab-diabetes for any education topic details.)    RECOMMENDATIONS:  She will give " "meal insulin probably set dose plus correction and Lantus twice daily or increased amount once daily.  Chart given to patient.     For inpatient diabetes management guidelines refer to \"Guidelines for Subcutaneous Insulin Dosing\" found in the DIAB Subcutaneous Insulin Management Adult order set.         Thank you,   Shelby Lacy RN, [facility]  Diabetes Care   VM: [phone]  Pager: [phone]             "

## 2022-05-06 NOTE — PROGRESS NOTES
[facility] ACUTE PAIN SERVICE CONSULTATION ([facility], [facility], [facility])     Date of Admission:  5/4/2022  Date of Consult (When I saw the patient): 05/06/22  Physician requesting consult: Dr. Garcia  Reason for consult: 51 y/o with acute on chronic cp--had angio -no stents needed, gi did see too     Assessment/Plan:     Kulwant Amin is a 52 year old female who was admitted on 5/4/2022.  1 Day Post-Op. I was asked to see the patient for chest pain, followed by CV, now signed off, cath per cards completed 5/5/22 - no severe disease, no stents - signed off, follow up in 4-6 months. GI consulted as well - recommended to continue home omeprazole, carafate and follow up outpatient - signed off. Admitted for chest pain. History of latent TB, asthma/COPD, hypertension, hyperthyroidism, anxiety, chronic abdominal pain, GERD, DM2, coronary artery disease, prior stroke and mood disorder presents with chest pain. Patient presents with exertional chest pain for 4 days. The patient is a former smoker (quit 19 yrs ago) and denies chemical dependency history.     Used a [employer]  during assessment. Daughter in law on facetime during assessment.  Patient reports chest pain, leg pain, BL headache pain. Improved with APAP. Patient reports pain is better today compared to yesterday, pain described as piercing. Patient repots taking gabapentin, flexeril, and amitriptyline daily at home for chronic neuropathic and back pain. Recommend maximizing multimodal pain therapy at this time, no indication for opioids.     PLAN:   1) Pain is consistent with chest pain, GI/CV signed off, unknown etiology, no remarkable findings per medical team. The patient is opioid naive.   2)Multimodal Medication Therapy  Topical: schedule lidocaine topical ointment TID to chest  Adjuvants: gabapentin 600 mg po TID (home med), APAP prn - schedule 975 mg po TID, flexeril 5-10 mg po TID prn (home med), no NSAIDs d/t cardiac  history  Antidepressants/anxiolytics: amitriptyline 20 mg po at bedtime (home med)  Opioids: oxycodone 5-10 mg po q4h prn - discontinue not using  IV Pain medication: none  3)Non-medication interventions- heat/ice prn   4)Constipation Prophylaxis- s/d x 1 po BID   5) Follow up   -PCP is Adrien Cardoza  -Discharge Recommendations - We recommend prescribing the following at the time of discharge: TBD    MN -pulled from system on 05/06/22. Last refill on gabapentin 300 mg #90 (15 day supply) from Daily Goldberg CNP. This indicated no opioid use.     Home pain medications: APAP 1g po q8h, amitriptyline 20 mg po at bedtime, flexeril 5-10 mg po TID prn, gabapentin 600 mg po TID         History of Present Illness (HPI):       Kulwant Amin is a 52 year old old female with chest pain - cardiology and GI signed off - medication adjustments-  follow up outpatient . Per MN  review, the patient is opioid naive.     Review of medical record/Summary of labs and care everywhere.      Last UDS: No results found for: UAMP, UBARB, BENZODIAZEUR, UCANN, UCOC, OPIT, UPCP         Zabrina Cobb, PharmD, BCPS, CPE  Acute Care Pain Management Program   [facility] ([facility], [facility], [facility])   Page via [employer]- Click HERE to page Prema or call [phone]

## 2022-05-06 NOTE — PLAN OF CARE
Spanish  was used for all assessments.      Problem: Plan of Care - These are the overarching goals to be used throughout the patient stay.    Goal: Plan of Care Review/Shift Note  Outcome: Ongoing, Progressing  Flowsheets (Taken 5/6/2022 1329)  Plan of Care Reviewed With:    patient    sibling  Overall Patient Progress: improving   Updated patient, brother and sister at bedside. Verbalized understanding. Likely home tomorrow. VSS. Tele: SR, prolonged QT.    Problem: Pain Acute  Goal: Acceptable Pain Control and Functional Ability  Outcome: Ongoing, Progressing  Intervention: Develop Pain Management Plan  Recent Flowsheet Documentation  Taken 5/6/2022 0852 by Alethea Braxton RN  Pain Management Interventions:    medication (see MAR)    MD notified (comment)  Intervention: Prevent or Manage Pain  Recent Flowsheet Documentation  Taken 5/6/2022 1240 by Alethea Braxton RN  Medication Review/Management: medications reviewed  Taken 5/6/2022 0800 by Alethea Braxton RN  Medication Review/Management: medications reviewed   Pain team consulted this am. Starting on scheduled Tylenol and Lidocaine ointment. She reports feeling better but still has 4/10 generalized pain in abd/epigastric area. Also, h/a from receiving Nitroglycerin yesterday is weaning.     Problem: Constipation  Goal: Effective Bowel Elimination  Outcome: Ongoing, Progressing  Patient c/o not having a BM x 3d. Senna started and suppository given this am; had large BM around lunch time. Passing flatus, BS active, denies nausea.

## 2022-05-06 NOTE — PROGRESS NOTES
[facility]    Medicine Progress Note - Hospitalist Service    Date of Admission:  5/4/2022    Assessment & Plan          52-year-old female with history of latent TB, asthma/COPD, hypertension, hyperthyroidism, anxiety, chronic abdominal pain, GERD, DM2, coronary artery disease, prior stroke and mood disorder presents with chest pain        1.Chest pain: Noted is history of CAD(hx of stent 2018) and reactive airways disease.  Patient presents with exertional chest pain for 4 days.  States that she ran out of her Plavix 4 days ago(per review of cards notes, were planning on stopping)-  -CTA neg pe, trops neg     -echo done 5/5/22  Left ventricular size, wall motion and function are normal. The ejection  fraction is 55-60%.  There is borderline anterior, septal, and apical wall hypokinesis.  Normal right ventricle size and systolic function.  No hemodynamically significant valvular abnormalities on 2D or color flow  imaging.  -concern for her symptoms to be angina for her and DM not controlled    -cath per cards done 5/5/22    -Dist RCA lesion is 25% stenosed.    Prox Cx lesion is 30% stenosed.    LAD stent widely patent    No stents needed       -with her cath looking this good-->per cards, asa, statin, metoprolol if tolerated--->follow up with Dr. Kramer in Cardiology in 4-6 mo  -had GI see as well and she already had extensive GI work up  -will ask Pain team to see as well    2.N/V  -cardiac work up done above  - Continue home famotidine, PPI and sucralfate  -GI does not feel repeating scopes needed with hx of them    3.Constipation  -supp now  -senna/colace     4.History of COPD/asthma:Not hypoxic, CTA of the chest clear  -continue home inhalers and singulair  - Of note patient receives Dupixent dosing every 2 weeks     5.DM2:   - Hold metformin(got CTA )  -lantus and novolog  -not controlled--?if some of her pain ?gastroparesis     6.HTN: continue home hydrochlorothiazide and  metoprolol      7.Chronic pain, mood disorder: Continue home Elavil, Flexeril, gabapentin  -will ask Pain team to see to re -eval here     8.History of prior stroke: Continue home aspirin and statin     9.History of hyperthyroidism: Patient not on any chronic therapy for this, last TSH checked January 2021 was normal.Will update now TSH     10.DVT PPX: SCD +Hep     Code status:  Full Code            Diet: High Consistent Carb (75 g CHO per Meal) Diet    DVT Prophylaxis: Pneumatic Compression Devices Hep  Collins Catheter: Not present  Central Lines: None  Cardiac Monitoring: ACTIVE order. Indication: Post- PCI/Angiogram (24 hours)  Code Status: Full Code      Disposition Plan   Expected Discharge:  pain team to see   Anticipated discharge location:  Awaiting care coordination huddle  Delays:   here at least one more day        The patient's care was discussed with the Care Coordinator/ and Patient. I called daughter in law with  at 1414--updated    Whit Garcia MD  Hospitalist Service  [facility]  Securely message with the Vocera Web Console (learn more here)  Text page via [employer] Paging/Directory           ______________________________________________________________________    Interval History   I used Jabber  She still has cp 4/10--not changing  She notes no stool 3 days    Data reviewed today: I reviewed all medications, new labs and imaging results over the last 24 hours. I personally reviewed no images or EKG's today.    Physical Exam   Vital Signs: Temp: 98  F (36.7  C) Temp src: Oral BP: 97/61 Pulse: 81   Resp: 18 SpO2: 95 % O2 Device: None (Room air) Oxygen Delivery: 2 LPM  Weight: 123 lbs 4.8 oz  Constitutional: awake, fatigued, alert, cooperative and no apparent distress  Respiratory: No increased work of breathing, good air exchange, clear to auscultation bilaterally, no crackles or wheezing  Cardiovascular: Normal apical impulse, regular rate and  rhythm, normal S1 and S2, no S3 or S4, and no murmur noted  GI: normal bowel sounds, soft, non-distended and non-tender  Skin: no bruising or bleeding  Musculoskeletal: no lower extremity pitting edema present  Neurologic: Mental Status Exam:  Level of Alertness:   awake  Neuropsychiatric: General: normal, calm and normal eye contact    Data   Recent Labs   Lab 05/06/22  0824 05/06/22  0426 05/05/22 2031 05/05/22  0837 05/05/22  0737 05/04/22  2144 05/04/22  0908   WBC  --  8.5  --   --  8.8  --  7.6   HGB  --  12.2  --   --  12.5  --  12.7   MCV  --  85  --   --  84  --  84   PLT  --  208  --   --  230  --  217   NA  --  142  --   --  141  --  139   POTASSIUM  --  3.5  --   --  3.8  --  3.8   CHLORIDE  --  108*  --   --  106  --  106   CO2  --  25  --   --  25  --  22   BUN  --  10  --   --  11  --  7*   CR  --  0.63  --   --  0.69  --  0.65   ANIONGAP  --  9  --   --  10  --  11   FREDY  --  9.0  --   --  9.1  --  9.3   * 166* 308*   < > 220*   < > 192*   ALBUMIN  --   --   --   --  3.3*  --  3.5   PROTTOTAL  --   --   --   --  6.7  --  6.9   BILITOTAL  --   --   --   --  0.5  --  0.4   ALKPHOS  --   --   --   --  89  --  94   ALT  --   --   --   --  14  --  15   AST  --   --   --   --  17  --  18   LIPASE  --   --   --   --   --   --  31    < > = values in this interval not displayed.     Recent Results (from the past 24 hour(s))   Cardiac Catheterization    Narrative      Dist RCA lesion is 25% stenosed.    Prox Cx lesion is 30% stenosed.    LAD stent widely patent

## 2022-05-06 NOTE — PROGRESS NOTES
Follow up order entered.  -[facility]    ----- Message -----  From: Angelo Serrano MD  Sent: 5/6/2022  10:14 AM CDT  To: Dhara Everett    Follow up with Dr. Kramer in 4-6 months

## 2022-05-06 NOTE — PROGRESS NOTES
PRIMARY DIAGNOSIS: CHEST PAIN  OUTPATIENT/OBSERVATION GOALS TO BE MET BEFORE DISCHARGE:  1. Ruled out acute coronary syndrome (negative or stable Troponin):  Yes  2. Pain Status: Improved-controlled with oral pain medications.  3. Appropriate provocative testing performed: Yes  - Stress Test Procedure: ECHO, stress test done recently  - Interpretation of cardiac rhythm per telemetry tech: NSR    4. Cleared by Consultants (if applicable):Yes  5. Return to near baseline physical activity: Yes  Discharge Planner Nurse   Safe discharge environment identified: Yes  Barriers to discharge: No       Entered by: Diane Patton RN 05/06/2022 12:32 AM     Please review provider order for any additional goals.   Nurse to notify provider when observation goals have been met and patient is ready for discharge.

## 2022-05-06 NOTE — PROGRESS NOTES
Chart reviewed cath no severe dz    Agree with plan re statin, would start asp 81mg if tolerated, and metoprolol - follow up with Dr. Kramer in Cardiology in 4-6 mo    Thank you will sign off    Angelo Serrano

## 2022-05-06 NOTE — PLAN OF CARE
Problem: Plan of Care - These are the overarching goals to be used throughout the patient stay.    Goal: Absence of Hospital-Acquired Illness or Injury  Outcome: Met  Intervention: Identify and Manage Fall Risk  Recent Flowsheet Documentation  Taken 5/6/2022 0016 by Diane Patton RN  Safety Promotion/Fall Prevention:   assistive device/personal items within reach   room organization consistent   nonskid shoes/slippers when out of bed   patient and family education    Goal: Optimal Comfort and Wellbeing  Outcome: Met     Problem: Pain Acute  Goal: Acceptable Pain Control and Functional Ability  Outcome: Met   Goal Outcome Evaluation:      Pt. Alert and oriented. Slept well over the night. Continues on telemetry. Right wrist post angio site slightly bruised, no hematoma. Prn Tylenol given at bedtime for pain control. Pt. Using call light for needs. Ambulates to bathroom, voiding well.    Diane Patton, RN

## 2022-05-06 NOTE — PROGRESS NOTES
PRIMARY DIAGNOSIS: CHEST PAIN  OUTPATIENT/OBSERVATION GOALS TO BE MET BEFORE DISCHARGE:  1. Ruled out acute coronary syndrome (negative or stable Troponin):  Yes  2. Pain Status: Improved-controlled with oral pain medications.  3. Appropriate provocative testing performed: N/A  - Stress Test Procedure: Regular  - Interpretation of cardiac rhythm per telemetry tech: NSR    4. Cleared by Consultants (if applicable):Yes  5. Return to near baseline physical activity: Yes  Discharge Planner Nurse   Safe discharge environment identified: Yes  Barriers to discharge: No       Entered by: Diane Patton RN 05/06/2022 12:30 AM     Please review provider order for any additional goals.   Nurse to notify provider when observation goals have been met and patient is ready for discharge.

## 2022-05-07 ENCOUNTER — APPOINTMENT (OUTPATIENT)
Dept: PHYSICAL THERAPY | Facility: HOSPITAL | Age: 52
End: 2022-05-07
Attending: INTERNAL MEDICINE
Payer: COMMERCIAL

## 2022-05-07 ENCOUNTER — APPOINTMENT (OUTPATIENT)
Dept: ULTRASOUND IMAGING | Facility: HOSPITAL | Age: 52
End: 2022-05-07
Attending: INTERNAL MEDICINE
Payer: COMMERCIAL

## 2022-05-07 LAB
ANION GAP SERPL CALCULATED.3IONS-SCNC: 9 MMOL/L (ref 5–18)
BUN SERPL-MCNC: 12 MG/DL (ref 8–22)
CALCIUM SERPL-MCNC: 8.6 MG/DL (ref 8.5–10.5)
CHLORIDE BLD-SCNC: 108 MMOL/L (ref 98–107)
CK SERPL-CCNC: 23 U/L (ref 30–190)
CO2 SERPL-SCNC: 21 MMOL/L (ref 22–31)
CREAT SERPL-MCNC: 0.74 MG/DL (ref 0.6–1.1)
ERYTHROCYTE [DISTWIDTH] IN BLOOD BY AUTOMATED COUNT: 13.9 % (ref 10–15)
GFR SERPL CREATININE-BSD FRML MDRD: >90 ML/MIN/1.73M2
GLUCOSE BLD-MCNC: 408 MG/DL (ref 70–125)
GLUCOSE BLDC GLUCOMTR-MCNC: 123 MG/DL (ref 70–99)
GLUCOSE BLDC GLUCOMTR-MCNC: 249 MG/DL (ref 70–99)
GLUCOSE BLDC GLUCOMTR-MCNC: 261 MG/DL (ref 70–99)
GLUCOSE BLDC GLUCOMTR-MCNC: 268 MG/DL (ref 70–99)
HCT VFR BLD AUTO: 38.8 % (ref 35–47)
HGB BLD-MCNC: 11.9 G/DL (ref 11.7–15.7)
MAGNESIUM SERPL-MCNC: 1.8 MG/DL (ref 1.8–2.6)
MCH RBC QN AUTO: 26 PG (ref 26.5–33)
MCHC RBC AUTO-ENTMCNC: 30.7 G/DL (ref 31.5–36.5)
MCV RBC AUTO: 85 FL (ref 78–100)
PLATELET # BLD AUTO: 204 10E3/UL (ref 150–450)
POTASSIUM BLD-SCNC: 4.1 MMOL/L (ref 3.5–5)
RBC # BLD AUTO: 4.57 10E6/UL (ref 3.8–5.2)
SODIUM SERPL-SCNC: 138 MMOL/L (ref 136–145)
WBC # BLD AUTO: 7.1 10E3/UL (ref 4–11)

## 2022-05-07 PROCEDURE — 97161 PT EVAL LOW COMPLEX 20 MIN: CPT | Mod: GP

## 2022-05-07 PROCEDURE — 250N000011 HC RX IP 250 OP 636: Performed by: INTERNAL MEDICINE

## 2022-05-07 PROCEDURE — 85027 COMPLETE CBC AUTOMATED: CPT | Performed by: INTERNAL MEDICINE

## 2022-05-07 PROCEDURE — 83735 ASSAY OF MAGNESIUM: CPT | Performed by: INTERNAL MEDICINE

## 2022-05-07 PROCEDURE — 96372 THER/PROPH/DIAG INJ SC/IM: CPT | Performed by: INTERNAL MEDICINE

## 2022-05-07 PROCEDURE — 250N000013 HC RX MED GY IP 250 OP 250 PS 637: Performed by: PAIN MEDICINE

## 2022-05-07 PROCEDURE — 82550 ASSAY OF CK (CPK): CPT | Performed by: INTERNAL MEDICINE

## 2022-05-07 PROCEDURE — G0378 HOSPITAL OBSERVATION PER HR: HCPCS

## 2022-05-07 PROCEDURE — 80048 BASIC METABOLIC PNL TOTAL CA: CPT | Performed by: INTERNAL MEDICINE

## 2022-05-07 PROCEDURE — 93970 EXTREMITY STUDY: CPT

## 2022-05-07 PROCEDURE — 36415 COLL VENOUS BLD VENIPUNCTURE: CPT | Performed by: INTERNAL MEDICINE

## 2022-05-07 PROCEDURE — 99214 OFFICE O/P EST MOD 30 MIN: CPT | Performed by: INTERNAL MEDICINE

## 2022-05-07 PROCEDURE — 250N000013 HC RX MED GY IP 250 OP 250 PS 637: Performed by: INTERNAL MEDICINE

## 2022-05-07 PROCEDURE — 96372 THER/PROPH/DIAG INJ SC/IM: CPT

## 2022-05-07 PROCEDURE — 97530 THERAPEUTIC ACTIVITIES: CPT | Mod: GP

## 2022-05-07 PROCEDURE — 82962 GLUCOSE BLOOD TEST: CPT

## 2022-05-07 RX ADMIN — FAMOTIDINE 20 MG: 20 TABLET ORAL at 09:01

## 2022-05-07 RX ADMIN — SENNOSIDES AND DOCUSATE SODIUM 1 TABLET: 8.6; 5 TABLET ORAL at 09:08

## 2022-05-07 RX ADMIN — SUCRALFATE 1 G: 1 TABLET ORAL at 17:34

## 2022-05-07 RX ADMIN — HEPARIN SODIUM 5000 UNITS: 10000 INJECTION, SOLUTION INTRAVENOUS; SUBCUTANEOUS at 20:12

## 2022-05-07 RX ADMIN — GABAPENTIN 600 MG: 300 CAPSULE ORAL at 15:08

## 2022-05-07 RX ADMIN — SUCRALFATE 1 G: 1 TABLET ORAL at 21:24

## 2022-05-07 RX ADMIN — SUCRALFATE 1 G: 1 TABLET ORAL at 11:52

## 2022-05-07 RX ADMIN — HYDROCHLOROTHIAZIDE 12.5 MG: 12.5 TABLET ORAL at 09:02

## 2022-05-07 RX ADMIN — METFORMIN HYDROCHLORIDE 1000 MG: 500 TABLET, FILM COATED ORAL at 17:33

## 2022-05-07 RX ADMIN — MONTELUKAST 10 MG: 10 TABLET, FILM COATED ORAL at 21:23

## 2022-05-07 RX ADMIN — ACETAMINOPHEN 975 MG: 325 TABLET ORAL at 08:58

## 2022-05-07 RX ADMIN — ACETAMINOPHEN 975 MG: 325 TABLET ORAL at 15:08

## 2022-05-07 RX ADMIN — FAMOTIDINE 20 MG: 20 TABLET ORAL at 20:11

## 2022-05-07 RX ADMIN — ACETAMINOPHEN 975 MG: 325 TABLET ORAL at 20:11

## 2022-05-07 RX ADMIN — METOPROLOL TARTRATE 25 MG: 25 TABLET, FILM COATED ORAL at 20:12

## 2022-05-07 RX ADMIN — LORATADINE 10 MG: 10 TABLET ORAL at 09:07

## 2022-05-07 RX ADMIN — SENNOSIDES AND DOCUSATE SODIUM 1 TABLET: 8.6; 5 TABLET ORAL at 20:11

## 2022-05-07 RX ADMIN — GABAPENTIN 600 MG: 300 CAPSULE ORAL at 20:12

## 2022-05-07 RX ADMIN — METFORMIN HYDROCHLORIDE 1000 MG: 500 TABLET, FILM COATED ORAL at 09:08

## 2022-05-07 RX ADMIN — METOPROLOL TARTRATE 25 MG: 25 TABLET, FILM COATED ORAL at 09:08

## 2022-05-07 RX ADMIN — UMECLIDINIUM 1 PUFF: 62.5 AEROSOL, POWDER ORAL at 09:12

## 2022-05-07 RX ADMIN — FLUTICASONE FUROATE AND VILANTEROL TRIFENATATE 1 PUFF: 200; 25 POWDER RESPIRATORY (INHALATION) at 09:02

## 2022-05-07 RX ADMIN — PANTOPRAZOLE SODIUM 40 MG: 40 TABLET, DELAYED RELEASE ORAL at 09:08

## 2022-05-07 RX ADMIN — SUCRALFATE 1 G: 1 TABLET ORAL at 06:42

## 2022-05-07 RX ADMIN — LIDOCAINE: 50 OINTMENT TOPICAL at 17:34

## 2022-05-07 RX ADMIN — ATORVASTATIN CALCIUM 80 MG: 40 TABLET, FILM COATED ORAL at 20:12

## 2022-05-07 RX ADMIN — AMITRIPTYLINE HYDROCHLORIDE 20 MG: 10 TABLET, FILM COATED ORAL at 21:24

## 2022-05-07 RX ADMIN — GABAPENTIN 600 MG: 300 CAPSULE ORAL at 09:01

## 2022-05-07 RX ADMIN — ASPIRIN 81 MG: 81 TABLET, COATED ORAL at 09:00

## 2022-05-07 RX ADMIN — HEPARIN SODIUM 5000 UNITS: 10000 INJECTION, SOLUTION INTRAVENOUS; SUBCUTANEOUS at 09:02

## 2022-05-07 RX ADMIN — LIDOCAINE: 50 OINTMENT TOPICAL at 21:24

## 2022-05-07 NOTE — PLAN OF CARE
Goal Outcome Evaluation: Assumed care 2300 to 0730. A&O x 4. Assist x 1 with a gait belt and cane. Tele is NSR. Non-verbal indicators of pain absent. Pt slept for the majority of the shift. Up to toilet. Call light within reach, able to make needs known. Bed alarm on for safety.    Problem: Constipation  Goal: Effective Bowel Elimination  5/7/2022 0627 by CINDY ROMERO  Outcome: Ongoing, Progressing  5/7/2022 0627 by CINDY ROMERO  Outcome: Ongoing, Progressing     Problem: Pain Acute  Goal: Acceptable Pain Control and Functional Ability  Intervention: Prevent or Manage Pain  Recent Flowsheet Documentation  Taken 5/7/2022 0400 by CINDY ROMERO  Medication Review/Management: medications reviewed  Taken 5/7/2022 0040 by CINDY ROMERO  Medication Review/Management: medications reviewed

## 2022-05-07 NOTE — PROGRESS NOTES
[facility]      OUTPATIENT PHYSICAL THERAPY EVALUATION  PLAN OF TREATMENT FOR OUTPATIENT REHABILITATION  (COMPLETE FOR INITIAL CLAIMS ONLY)  Patient's Last Name, First Name, M.I.  YOB: 1970  Kulwant Amin                        Provider's Name  [facility] Medical Record No.  [id number]                               Onset Date:  05/04/22   Start of Care Date:  05/07/22      Type:     _X_PT   ___OT   ___SLP Medical Diagnosis:  Chest pain.                        PT Diagnosis:  Impaired functional mobility and activity tolerance.   Visits from SOC:  1   _________________________________________________________________________________  Plan of Treatment/Functional Goals    Planned Interventions: balance training, bed mobility training, gait training, home exercise program, patient/family education, stair training, strengthening, transfer training     Goals: See Physical Therapy Goals on Care Plan in [employer] electronic health record.    Therapy Frequency: Daily  Predicted Duration of Therapy Intervention: 05/14/22  _________________________________________________________________________________    I CERTIFY THE NEED FOR THESE SERVICES FURNISHED UNDER        THIS PLAN OF TREATMENT AND WHILE UNDER MY CARE     (Physician co-signature of this document indicates review and certification of the therapy plan).                Certification date from: 05/07/22, Certification date to: 05/14/22    Referring Physician: Dr. Whit Garcia.            Initial Assessment        See Physical Therapy evaluation dated 05/07/22 in Epic electronic health record.

## 2022-05-07 NOTE — PROGRESS NOTES
[facility] [place]    Medicine Progress Note - Hospitalist Service    Date of Admission:  5/4/2022    Assessment & Plan            52-year-old female with history of latent TB, asthma/COPD, hypertension, hyperthyroidism, anxiety, chronic abdominal pain, GERD, DM2, coronary artery disease, prior stroke and mood disorder presents with chest pain        1.Chest pain: Noted is history of CAD(hx of stent 2018) and reactive airways disease.  Patient presents with exertional chest pain for 4 days.  States that she ran out of her Plavix 4 days ago(per review of cards notes, were planning on stopping)-  -CTA neg pe, trops neg     -echo done 5/5/22  Left ventricular size, wall motion and function are normal. The ejection  fraction is 55-60%.  There is borderline anterior, septal, and apical wall hypokinesis.  Normal right ventricle size and systolic function.  No hemodynamically significant valvular abnormalities on 2D or color flow  imaging.  -concern for her symptoms to be angina for her and DM not controlled     -cath per cards done 5/5/22    -Dist RCA lesion is 25% stenosed.    Prox Cx lesion is 30% stenosed.    LAD stent widely patent    No stents needed        -with her cath looking this good-->per cards, asa, statin, metoprolol if tolerated--->follow up with Dr. Kramer in Cardiology in 4-6 mo  -had GI see as well and she already had extensive GI work up  -also had Pain team to see as well  -cp has improved but as of today is still there, just less intense     2.N/V  -cardiac work up done above  - Continue home famotidine, PPI and sucralfate  -GI does not feel repeating scopes needed with hx of them     3.Constipation  -supp on 5/6--had BM--now no abd pains  -senna/colace     4.History of COPD/asthma:Not hypoxic, CTA of the chest clear  -continue home inhalers and singulair  - Of note patient receives Dupixent dosing every 2 weeks     5.DM2:   - restart metformin(got CTA )  -lantus and novolog  -not  controlled--?if some of her pain ?gastroparesis     6.HTN: continue home hydrochlorothiazide and metoprolol      7.Chronic pain, mood disorder: Continue home Elavil, Flexeril, gabapentin  -had Pain team to see to re -eval here     8.History of prior stroke: Continue home aspirin and statin     9.History of hyperthyroidism: Patient not on any chronic therapy for this, last TSH checked January 2021 was normal.Will update now TSH, although was low, free t4 ok    10.Leg pains  -she told me first about this today--notes there years--bur both legs really bothering her  -checked ultrasound on both legs  -get PT to eval her and ambulate today     11.DVT PPX: SCD +Hep     Code status:  Full Code              Diet: High Consistent Carb (75 g CHO per Meal) Diet    DVT Prophylaxis: Heparin SQ  Collins Catheter: Not present  Central Lines: None  Cardiac Monitoring: ACTIVE order. Indication: Chest pain/ ACS rule out (24 hours)  Code Status: Full Code      Disposition Plan   Expected Discharge: 05/08/2022     Anticipated discharge location:  Awaiting care coordination huddle  Delays:   PT eval, get pain controlled, hope home tomorrow        The patient's care was discussed with the Bedside Nurse, Care Coordinator/ and Patient.  I called family with  and left message with      Whit Garcia MD  Hospitalist [facility]  Securely message with the Vocera Web Console (learn more here)  Text page via [employer] Paging/Directory         __________________________________________________________    Interval History   I used phone   She told me cp down to 1-2 /10, which was better  No abd pain, had stool  BUT this am told me she was very bothered by legs pains in both legs  Leg pain x years --per pt, but really bad today  Both legs all of legs      Data reviewed today: I reviewed all medications, new labs and imaging results over the last 24 hours. I personally  reviewed no images or EKG's today.    Physical Exam   Vital Signs: Temp: 97.2  F (36.2  C) Temp src: Oral BP: 123/67 Pulse: 77   Resp: 18 SpO2: 97 % O2 Device: None (Room air)    Weight: 123 lbs 14.4 oz  Constitutional: awake, fatigued, alert, cooperative and no apparent distress  Respiratory: No increased work of breathing, good air exchange, clear to auscultation bilaterally, no crackles or wheezing  Cardiovascular: Normal apical impulse, regular rate and rhythm, normal S1 and S2, no S3 or S4, and no murmur noted  GI: normal bowel sounds, soft, non-distended and non-tender  Skin: no bruising or bleeding  Musculoskeletal: no tenderness in legs that is focal, entire leg tender, calves ,no redness or swelling at all, no joint swelling  no lower extremity pitting edema present  Neurologic: Mental Status Exam:  Level of Alertness:   awake  Neuropsychiatric: General: normal, calm and normal eye contact    Data   Recent Labs   Lab 05/07/22  1139 05/07/22  0740 05/07/22  0511 05/06/22  0824 05/06/22  0426 05/05/22  0837 05/05/22  0737 05/04/22  2144 05/04/22  0908   WBC  --   --  7.1  --  8.5  --  8.8  --  7.6   HGB  --   --  11.9  --  12.2  --  12.5  --  12.7   MCV  --   --  85  --  85  --  84  --  84   PLT  --   --  204  --  208  --  230  --  217   NA  --   --  138  --  142  --  141  --  139   POTASSIUM  --   --  4.1  --  3.5  --  3.8  --  3.8   CHLORIDE  --   --  108*  --  108*  --  106  --  106   CO2  --   --  21*  --  25  --  25  --  22   BUN  --   --  12  --  10  --  11  --  7*   CR  --   --  0.74  --  0.63  --  0.69  --  0.65   ANIONGAP  --   --  9  --  9  --  10  --  11   FREDY  --   --  8.6  --  9.0  --  9.1  --  9.3   * 268* 408*   < > 166*   < > 220*   < > 192*   ALBUMIN  --   --   --   --   --   --  3.3*  --  3.5   PROTTOTAL  --   --   --   --   --   --  6.7  --  6.9   BILITOTAL  --   --   --   --   --   --  0.5  --  0.4   ALKPHOS  --   --   --   --   --   --  89  --  94   ALT  --   --   --   --   --   --   14  --  15   AST  --   --   --   --   --   --  17  --  18   LIPASE  --   --   --   --   --   --   --   --  31    < > = values in this interval not displayed.     Recent Results (from the past 24 hour(s))   US Lower Extremity Venous Duplex Bilateral    Narrative    EXAM: US LOWER EXTREMITY VENOUS DUPLEX BILATERAL  LOCATION: [facility]  DATE/TIME: 5/7/2022 10:35 AM    INDICATION: Bilateral leg pain.  COMPARISON: None.  TECHNIQUE: Venous Duplex ultrasound of bilateral lower extremities with and without compression, augmentation and duplex. Color flow and spectral Doppler with waveform analysis performed.    FINDINGS: Exam includes the common femoral, femoral, popliteal veins as well as segmentally visualized deep calf veins and greater saphenous vein.     RIGHT: No deep vein thrombosis. No superficial thrombophlebitis. No popliteal cyst.    LEFT: No deep vein thrombosis. No superficial thrombophlebitis. No popliteal cyst.      Impression    IMPRESSION:  1.  No deep venous thrombosis in the bilateral lower extremities.

## 2022-05-07 NOTE — PLAN OF CARE
PRIMARY DIAGNOSIS: ACUTE PAIN  OUTPATIENT/OBSERVATION GOALS TO BE MET BEFORE DISCHARGE:  1. Pain Status: Improved-controlled with oral pain medications.    2. Return to near baseline physical activity: Yes    3. Cleared for discharge by consultants (if involved): Yes    Discharge Planner Nurse   Safe discharge environment identified: Yes  Barriers to discharge: No       Entered by: Demetria Yao RN 05/07/2022 12:30 AM     Please review provider order for any additional goals.   Nurse to notify provider when observation goals have been met and patient is ready for discharge.  Goal Outcome Evaluation:    Plan of Care Reviewed With: patient     Overall Patient Progress: improving    Outcome Evaluation: Feeling much better than yesterday.  Reports pain improved after each intervention.  Inspiratory and expiratory wheezing - Nebulizer given at 1717 and inhaler given at 2236.      Problem: Pain Acute  Goal: Acceptable Pain Control and Functional Ability  Outcome: Ongoing, Progressing  Intervention: Develop Pain Management Plan  Recent Flowsheet Documentation  Taken 5/6/2022 2050 by Demetria Yao RN  Pain Management Interventions: medication (see MAR)  Taken 5/6/2022 1830 by Demetria Yao RN  Pain Management Interventions: medication (see MAR)  Taken 5/6/2022 1650 by Demetria Yao RN  Pain Management Interventions:   emotional support   food  Intervention: Prevent or Manage Pain  Recent Flowsheet Documentation  Taken 5/6/2022 2050 by Demetria Yao RN  Medication Review/Management: medications reviewed  Taken 5/6/2022 1647 by Demetria Yao RN  Medication Review/Management: medications reviewed

## 2022-05-07 NOTE — PROGRESS NOTES
Therapy rec home PT. Reviewed therapy note which says patient has had [facility] before for PT and is agreeable again. Will discuss with patient/family which home care agency they prefer and send referrals

## 2022-05-07 NOTE — PROGRESS NOTES
05/07/22 1400   Quick Adds   Quick Adds Certification   Type of Visit Initial PT Evaluation       Present yes   Language Turkish   Living Environment   People in Home child(inge), adult   Current Living Arrangements house   Home Accessibility stairs to enter home   Number of Stairs, Main Entrance 3   Stair Railings, Main Entrance railing on right side (ascending)   Transportation Anticipated family or friend will provide   Living Environment Comments Pt lives in a house with family.   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   General Information   Onset of Illness/Injury or Date of Surgery 05/04/22   Referring Physician Dr. Whit Garcia.   Patient/Family Therapy Goals Statement (PT) To go home.   Pertinent History of Current Problem (include personal factors and/or comorbidities that impact the POC) From chart: 52-year-old female with history of latent TB, asthma/COPD, hypertension, hyperthyroidism, anxiety, chronic abdominal pain, GERD, DM2, coronary artery disease, prior stroke and mood disorder presents with chest pain.   Existing Precautions/Restrictions cardiac;fall   Weight-Bearing Status - LLE full weight-bearing   Weight-Bearing Status - RLE full weight-bearing   Heart Disease Risk Factors Lack of physical activity;Medical history   General Observations Female supine in bed.   Cognition   Affect/Mental Status (Cognition) WFL   Orientation Status (Cognition) oriented x 4   Follows Commands (Cognition) WFL   Cognitive Status Comments Pleasant, cooperative.   Pain Assessment   Patient Currently in Pain Yes, see Vital Sign flowsheet  (Slight chest pain, also diffuse chronic pain in various parts of the body.)   Integumentary/Edema    Integumentary/Edema Comments Not formally assessed.   Posture    Posture Forward head position   Range of Motion (ROM)   ROM Comment Grossly WFL.   Strength (Manual Muscle Testing)   Strength Comments Suspect slight gross impairment, especially in RLE based on gait quality.   Bed Mobility   Comment, (Bed Mobility) Supine-sit with SBA, somewhat effortful. Mildly dizzy and remains this way throughout session.   Transfers   Comment, (Transfers) Sit-stand with cane, Ivy.   Gait/Stairs (Locomotion)   Comment, (Gait/Stairs) Ambulated x 30 ft with cane, CGA-Ivy. Unsteady at times. Difficulty advancing/clearing RLE.   Balance   Balance Comments Suspect underlying impairments.   Sensory Examination   Sensory Perception patient reports no sensory changes   Coordination   Coordination no deficits were identified   Muscle Tone   Muscle Tone no deficits were identified   Clinical Impression   Criteria for Skilled Therapeutic Intervention Yes, treatment indicated   PT Diagnosis (PT) Impaired functional mobility and activity tolerance.   Influenced by the following impairments Medical, weakness   Functional limitations due to impairments Ambulation, stairs, activity tolerance.   Clinical Presentation (PT Evaluation Complexity) Stable/Uncomplicated   Clinical Presentation Rationale Clinician judgment.   Clinical Decision Making (Complexity) low complexity   Planned Therapy Interventions (PT) balance training;bed mobility training;gait training;home exercise program;patient/family education;stair training;strengthening;transfer training   Risk & Benefits of therapy have been explained patient;son   PT Discharge Planning   PT Discharge Recommendation (DC Rec) home with assist;home with home care physical therapy   PT Rationale for DC Rec Pt strongly prefers home discharge. Family able to assist, has had HHPT before and willing to have them come again to work on strengthening and endurance.   PT Brief overview of current status PT  raymond completed. Pt is SBA for bed mobility, Ivy for sit-stand with cane, Ivy for ambulating 30 ft with cane. Slightly dizzy throughout session. Poor activity tolerance.   Plan of Care Review   Plan of Care Reviewed With patient   Therapy Certification   Start of care date 05/07/22   Certification date from 05/07/22   Certification date to 05/14/22   Medical Diagnosis Chest pain.   Total Evaluation Time   Total Evaluation Time (Minutes) 8   Physical Therapy Goals   PT Frequency Daily   PT Predicted Duration/Target Date for Goal Attainment 05/14/22   PT Goals Bed Mobility;Transfers;Gait;Stairs   PT: Bed Mobility Independent;Supine to/from sit   PT: Transfers Modified independent;Sit to/from stand;Assistive device   PT: Gait Modified independent;Assistive device;Greater than 200 feet   PT: Stairs Modified independent;3 stairs;Rail on right

## 2022-05-07 NOTE — PROGRESS NOTES
Will not see today:  PAIN MANAGEMENT SERVICE CHART CHECK  This patient's chart has been reviewed by the Pain Service. It has been determined that no change is necessary to the pain management regimen at this time. The chart will be reviewed regularly and the patient will be seen if necessary. If you would like the patient to be seen, please contact the service at [phone] and ask to have the patient seen.    Thank you!    Prema Capps [facility]   Clinical Nurse Specialist & Nurse Practitioner   Page Jefry on [facility]- Click Here

## 2022-05-08 VITALS
HEART RATE: 88 BPM | TEMPERATURE: 97.9 F | WEIGHT: 123.9 LBS | HEIGHT: 62 IN | RESPIRATION RATE: 18 BRPM | OXYGEN SATURATION: 95 % | SYSTOLIC BLOOD PRESSURE: 126 MMHG | DIASTOLIC BLOOD PRESSURE: 74 MMHG | BODY MASS INDEX: 22.8 KG/M2

## 2022-05-08 LAB
ANION GAP SERPL CALCULATED.3IONS-SCNC: 10 MMOL/L (ref 5–18)
BUN SERPL-MCNC: 11 MG/DL (ref 8–22)
CALCIUM SERPL-MCNC: 9.2 MG/DL (ref 8.5–10.5)
CHLORIDE BLD-SCNC: 105 MMOL/L (ref 98–107)
CO2 SERPL-SCNC: 22 MMOL/L (ref 22–31)
CREAT SERPL-MCNC: 0.74 MG/DL (ref 0.6–1.1)
GFR SERPL CREATININE-BSD FRML MDRD: >90 ML/MIN/1.73M2
GLUCOSE BLD-MCNC: 269 MG/DL (ref 70–125)
GLUCOSE BLDC GLUCOMTR-MCNC: 292 MG/DL (ref 70–99)
MAGNESIUM SERPL-MCNC: 1.6 MG/DL (ref 1.8–2.6)
POTASSIUM BLD-SCNC: 4 MMOL/L (ref 3.5–5)
SODIUM SERPL-SCNC: 137 MMOL/L (ref 136–145)

## 2022-05-08 PROCEDURE — 96375 TX/PRO/DX INJ NEW DRUG ADDON: CPT

## 2022-05-08 PROCEDURE — 250N000013 HC RX MED GY IP 250 OP 250 PS 637: Performed by: INTERNAL MEDICINE

## 2022-05-08 PROCEDURE — 82962 GLUCOSE BLOOD TEST: CPT

## 2022-05-08 PROCEDURE — 96372 THER/PROPH/DIAG INJ SC/IM: CPT | Performed by: INTERNAL MEDICINE

## 2022-05-08 PROCEDURE — 80048 BASIC METABOLIC PNL TOTAL CA: CPT | Performed by: INTERNAL MEDICINE

## 2022-05-08 PROCEDURE — 36415 COLL VENOUS BLD VENIPUNCTURE: CPT | Performed by: INTERNAL MEDICINE

## 2022-05-08 PROCEDURE — 96372 THER/PROPH/DIAG INJ SC/IM: CPT

## 2022-05-08 PROCEDURE — 250N000013 HC RX MED GY IP 250 OP 250 PS 637: Performed by: PAIN MEDICINE

## 2022-05-08 PROCEDURE — 250N000011 HC RX IP 250 OP 636: Performed by: INTERNAL MEDICINE

## 2022-05-08 PROCEDURE — 99214 OFFICE O/P EST MOD 30 MIN: CPT | Performed by: INTERNAL MEDICINE

## 2022-05-08 PROCEDURE — 83735 ASSAY OF MAGNESIUM: CPT | Performed by: INTERNAL MEDICINE

## 2022-05-08 PROCEDURE — G0378 HOSPITAL OBSERVATION PER HR: HCPCS

## 2022-05-08 RX ORDER — LIDOCAINE 50 MG/G
OINTMENT TOPICAL 3 TIMES DAILY PRN
Qty: 35.44 G | Refills: 0 | Status: SHIPPED | OUTPATIENT
Start: 2022-05-08 | End: 2023-11-07

## 2022-05-08 RX ORDER — MAGNESIUM SULFATE HEPTAHYDRATE 40 MG/ML
2 INJECTION, SOLUTION INTRAVENOUS ONCE
Status: COMPLETED | OUTPATIENT
Start: 2022-05-08 | End: 2022-05-08

## 2022-05-08 RX ADMIN — ASPIRIN 81 MG: 81 TABLET, COATED ORAL at 08:01

## 2022-05-08 RX ADMIN — METFORMIN HYDROCHLORIDE 1000 MG: 500 TABLET, FILM COATED ORAL at 08:03

## 2022-05-08 RX ADMIN — METOPROLOL TARTRATE 25 MG: 25 TABLET, FILM COATED ORAL at 08:09

## 2022-05-08 RX ADMIN — GABAPENTIN 600 MG: 300 CAPSULE ORAL at 08:02

## 2022-05-08 RX ADMIN — LORATADINE 10 MG: 10 TABLET ORAL at 08:03

## 2022-05-08 RX ADMIN — SUCRALFATE 1 G: 1 TABLET ORAL at 06:59

## 2022-05-08 RX ADMIN — FLUTICASONE FUROATE AND VILANTEROL TRIFENATATE 1 PUFF: 200; 25 POWDER RESPIRATORY (INHALATION) at 08:08

## 2022-05-08 RX ADMIN — HEPARIN SODIUM 5000 UNITS: 10000 INJECTION, SOLUTION INTRAVENOUS; SUBCUTANEOUS at 07:49

## 2022-05-08 RX ADMIN — UMECLIDINIUM 1 PUFF: 62.5 AEROSOL, POWDER ORAL at 08:12

## 2022-05-08 RX ADMIN — HYDROCHLOROTHIAZIDE 12.5 MG: 12.5 TABLET ORAL at 08:15

## 2022-05-08 RX ADMIN — LIDOCAINE: 50 OINTMENT TOPICAL at 08:11

## 2022-05-08 RX ADMIN — MAGNESIUM SULFATE IN WATER 2 G: 40 INJECTION, SOLUTION INTRAVENOUS at 07:44

## 2022-05-08 RX ADMIN — PANTOPRAZOLE SODIUM 40 MG: 40 TABLET, DELAYED RELEASE ORAL at 08:06

## 2022-05-08 RX ADMIN — FAMOTIDINE 20 MG: 20 TABLET ORAL at 08:02

## 2022-05-08 RX ADMIN — ACETAMINOPHEN 975 MG: 325 TABLET ORAL at 08:01

## 2022-05-08 RX ADMIN — SENNOSIDES AND DOCUSATE SODIUM 1 TABLET: 8.6; 5 TABLET ORAL at 08:02

## 2022-05-08 NOTE — DISCHARGE SUMMARY
[facility] MEDICINE  DISCHARGE SUMMARY     Primary Care Physician: Adrien Cardoza  Admission Date: 5/4/2022   Discharge Provider: Whit Garcia MD Discharge Date: 5/8/2022   Diet:   Active Diet and Nourishment Order   Procedures     High Consistent Carb (75 g CHO per Meal) Diet     Diet       Code Status: Full Code   Activity: DCACTIVITY: Activity as tolerated        Condition at Discharge: Fair     REASON FOR PRESENTATION(See Admission Note for Details)   Chest pain    PRINCIPAL & ACTIVE DISCHARGE DIAGNOSES     Principal Problem:    Chest pain  Active Problems:    TB lung, latent    HTN (hypertension)    Hyperthyroidism    Anxiety    Coronary artery disease due to lipid rich plaque    Hyperlipidemia    Moderate major depression (H)    Moderate persistent asthma    Type 2 diabetes mellitus treated without insulin (H)    GERD (gastroesophageal reflux disease)    H/O arterial ischemic stroke    Chronic abdominal pain    FLACO (obstructive sleep apnea)- mild (AHI 14)    Chest pain, unspecified type      PENDING LABS     Unresulted Labs Ordered in the Past 30 Days of this Admission     No orders found from 4/4/2022 to 5/5/2022.            PROCEDURES ( this hospitalization only)      Procedure(s):  Coronary Angiogram    RECOMMENDATIONS TO OUTPATIENT PROVIDER FOR F/U VISIT     Follow-up Appointments     Follow-up and recommended labs and tests       Follow up with primary care provider, Adrien Cardoza, within 3-4 days for   hospital follow- up.  The following labs/tests are recommended: bmp and   mag.    Follow up with Dr. Hirsch cardiology clinic---3 months                 DISPOSITION     Home    SUMMARY OF HOSPITAL COURSE:      52-year-old female with history of latent TB, asthma/COPD, hypertension, hyperthyroidism, anxiety, chronic abdominal pain, GERD, DM2, coronary artery disease, prior stroke and mood disorder presents with chest pain        1.Chest pain: Noted is history of CAD(hx of  stent 2018) and reactive airways disease.  Patient presents with exertional chest pain for 4 days.  States that she ran out of her Plavix 4 days ago(per review of cards notes, were planning on stopping)-  -CTA neg pe, trops neg     -echo done 5/5/22  Left ventricular size, wall motion and function are normal. The ejection  fraction is 55-60%.  There is borderline anterior, septal, and apical wall hypokinesis.  Normal right ventricle size and systolic function.  No hemodynamically significant valvular abnormalities on 2D or color flow  imaging.  -concern for her symptoms to be angina for her and DM not controlled     -cath per cards done 5/5/22    -Dist RCA lesion is 25% stenosed.    Prox Cx lesion is 30% stenosed.    LAD stent widely patent    No stents needed        -with her cath looking this good-->per cards, asa, statin, metoprolol if tolerated--->follow up with Dr. Hirsch  in Cardiology in 4-6 mo  -had GI see as well and she already had extensive GI work up  -also had Pain team to see as well  -cp has improved but as of today is still there, just less intense     2.N/V  -cardiac work up done above  - Continue home famotidine, PPI and sucralfate  -GI does not feel repeating scopes needed with hx of them     3.Constipation  -supp on 5/6--had BM--now no abd pains  -senna/colace     4.History of COPD/asthma:Not hypoxic, CTA of the chest clear  -continue home inhalers and singulair  - Of note patient receives Dupixent dosing every 2 weeks     5.DM2:   - restart metformin(got CTA )  -lantus and novolog  -not controlled--?if some of her pain ?gastroparesis     6.HTN: continue home hydrochlorothiazide and metoprolol      7.Chronic pain, mood disorder: Continue home Elavil, Flexeril, gabapentin  -had Pain team to see to re -eval here  -topical lidocaine is helping and will go home on it     8.History of prior stroke: Continue home aspirin and statin     9.History of hyperthyroidism: Patient not on any chronic therapy  for this, last TSH checked January 2021 was normal.Will update now TSH, although was low, free t4 ok     10.Leg pains  -she told me first about this today--notes there years--bur both legs really bothering her  -checked ultrasound on both legs--neg  -get PT to eval her and ambulate today     11.DVT PPX: SCD +Hep     Code status:  Full Code         Discharge Medications with Med changes:     Current Discharge Medication List      START taking these medications    Details   lidocaine (XYLOCAINE) 5 % external ointment Apply topically 3 times daily as needed Small amount (finger tip amount) to chest tid prn pain  Qty: 35.44 g, Refills: 0    Associated Diagnoses: Pain         CONTINUE these medications which have NOT CHANGED    Details   acetaminophen (TYLENOL) 500 MG tablet Take 2 tablets (1,000 mg) by mouth every 8 hours  Qty: 100 tablet, Refills: 0    Associated Diagnoses: Back pain      albuterol (PROAIR HFA/PROVENTIL HFA/VENTOLIN HFA) 108 (90 Base) MCG/ACT inhaler Inhale 2 puffs into the lungs every 4 hours as needed for shortness of breath / dyspnea  Qty: 4 g, Refills: 11    Comments: Pharmacy may dispense brand covered by insurance (Proair, or proventil or ventolin or generic albuterol inhaler)  Associated Diagnoses: SOB (shortness of breath)      albuterol (PROVENTIL) (2.5 MG/3ML) 0.083% neb solution Inhale 2.5 mg into the lungs every 6 hours as needed       amitriptyline (ELAVIL) 10 MG tablet TAKE 2 TABLETS (20 MG TOTAL) BY MOUTH AT BEDTIME.  Qty: 180 tablet, Refills: 3    Associated Diagnoses: Encounter for medication refill      ASPIRIN LOW DOSE 81 MG EC tablet TAKE 1 TABLET (81 MG TOTAL) BY MOUTH DAILY.  Qty: 90 tablet, Refills: 3    Associated Diagnoses: Encounter for medication refill      atorvastatin (LIPITOR) 80 MG tablet TAKE 1 TABLET (80 MG TOTAL) BY MOUTH AT BEDTIME FOR CHOLESTEROL  Qty: 90 tablet, Refills: 3    Associated Diagnoses: Encounter for medication refill      cyclobenzaprine (FLEXERIL) 5 MG  tablet TAKE 1-2 TABLETS (5-10 MG) BY MOUTH 3 TIMES DAILY AS NEEDED FOR MUSCLE SPASMS  Qty: 30 tablet, Refills: 3    Associated Diagnoses: Chronic bilateral low back pain with bilateral sciatica; Myofascial pain      Dupilumab (DUPIXENT) 300 MG/2ML SOPN Inject 300 mg Subcutaneous every 14 days  Qty: 2 mL, Refills: 3    Comments: --DOSE CHANGE--  Associated Diagnoses: Severe persistent asthma without complication      famotidine (PEPCID) 20 MG tablet Take 1 tablet (20 mg) by mouth 2 times daily  Qty: 90 tablet, Refills: 3    Associated Diagnoses: Chronic abdominal pain; SOB (shortness of breath); Coronary artery disease due to lipid rich plaque      fluticasone-vilanterol (BREO ELLIPTA) 200-25 MCG/INH inhaler Inhale 1 puff into the lungs daily  Qty: 60 each, Refills: 11    Associated Diagnoses: Severe persistent asthma      gabapentin (NEURONTIN) 300 MG capsule Take 2 capsules (600 mg) by mouth 3 times daily  Qty: 180 capsule, Refills: 3    Associated Diagnoses: Chronic bilateral low back pain with bilateral sciatica      guaiFENesin (ROBITUSSIN) 100 MG/5ML liquid Take 10 mLs (200 mg) by mouth every 4 hours as needed for cough  Qty: 200 mL, Refills: 1    Associated Diagnoses: Cough      hydrochlorothiazide (MICROZIDE) 12.5 MG capsule TAKE 1 CAPSULE (12.5 MG TOTAL) BY MOUTH DAILY FOR BLOOD PRESSURE  Qty: 90 capsule, Refills: 2    Associated Diagnoses: Essential hypertension      hydrocortisone (CORTAID) 1 % external cream Apply topically 2 times daily  Qty: 60 g, Refills: 0    Associated Diagnoses: Vaginal dryness, menopausal      Insulin Aspart FlexPen 100 UNIT/ML SOPN GIVE BEFORE MEALS: FOR PRE-MEAL GLUCOSE: 140-189 GIVE 1 UNIT, 190-239 GIVE 2 UNITS, 240-289 GIVE 3 UNITS, 290-339 GIVE 4 UNITS, =OR>340 GIVE 6 UNITS *84*  Qty: 15 mL, Refills: 0    Associated Diagnoses: Type 2 diabetes mellitus treated without insulin (H); Encounter for medication refill      insulin glargine (LANTUS PEN) 100 UNIT/ML pen Inject 10 Units  Subcutaneous every morning AND 24 Units At Bedtime.  Qty: 15 mL, Refills: 3    Comments: If Lantus is not covered by insurance, may substitute Basaglar or Semglee or other insulin glargine product per insurance preference at same dose and frequency.    Associated Diagnoses: Type 2 diabetes mellitus treated with insulin (H)      ipratropium - albuterol 0.5 mg/2.5 mg/3 mL (DUONEB) 0.5-2.5 (3) MG/3ML neb solution Take 1 vial (3 mLs) by nebulization every 6 hours as needed for shortness of breath / dyspnea or wheezing  Qty: 240 mL, Refills: 11    Associated Diagnoses: Chronic obstructive pulmonary disease, unspecified COPD type (H)      loratadine (CLARITIN) 10 MG tablet Take 10 mg by mouth daily      meclizine (ANTIVERT) 25 MG tablet Take 25 mg by mouth daily as needed       metFORMIN (GLUCOPHAGE) 1000 MG tablet Take 1 tablet (1,000 mg) by mouth 2 times daily (with meals)  Qty: 180 tablet, Refills: 1    Associated Diagnoses: Type 2 diabetes mellitus treated with insulin (H)      metoprolol tartrate (LOPRESSOR) 25 MG tablet TAKE 1 TABLET (25 MG TOTAL) BY MOUTH 2 (TWO) TIMES A DAY FOR BLOOD PRESSURE  Qty: 180 tablet, Refills: 3    Associated Diagnoses: Encounter for medication refill; Primary hypertension      montelukast (SINGULAIR) 10 MG tablet Take 1 tablet (10 mg) by mouth At Bedtime  Qty: 30 tablet, Refills: 6    Associated Diagnoses: Environmental allergies      omeprazole (PRILOSEC) 40 MG DR capsule Take 1 capsule (40 mg) by mouth daily  Qty: 90 capsule, Refills: 3    Associated Diagnoses: Gastroesophageal reflux disease without esophagitis      Polyethylene Glycol 3350 (PEG 3350) 17 GM/SCOOP POWD MIX 17 GRAMS WITH LIQUID AND DRINK ONCE DAILY FOR CONSTIPATION  Qty: 510 g, Refills: 12    Associated Diagnoses: Slow transit constipation; Encounter for medication refill      polyvinyl alcohol (ARTIFICIAL TEARS) 1.4 % ophthalmic solution Place 1 drop into both eyes as needed for dry eyes  Qty: 15 mL, Refills: 3     Associated Diagnoses: Eye pain, bilateral      sucralfate (CARAFATE) 1 GM tablet TAKE 1 TABLET (1 G TOTAL) BY MOUTH 4 (FOUR) TIMES A DAY BEFORE MEALS AND AT BEDTIME. TAKE 1 HOUR PRIOR TO MEALS  Qty: 120 tablet, Refills: 11    Associated Diagnoses: Encounter for medication refill      tiotropium (SPIRIVA RESPIMAT) 2.5 MCG/ACT inhalation aerosol Inhale 2 puffs into the lungs daily  Qty: 4 g, Refills: 1    Associated Diagnoses: Chronic obstructive pulmonary disease, unspecified COPD type (H)      !! B-D U/F 31G X 8 MM insulin pen needle USE ONE PEN NEEDLE DAILY AS NEEDED FOR SSI  Qty: 100 each, Refills: 1    Associated Diagnoses: Type 2 diabetes mellitus treated without insulin (H)      Continuous Blood Gluc  (FREESTYLE MONICA 14 DAY READER) MICHAEL Uses daily      Continuous Blood Gluc Sensor (FREESTYLE MONICA 14 DAY SENSOR) MISC USE 1 UNITS AS DIRECTED EVERY 14 (FOURTEEN) DAYS.  Qty: 2 each, Refills: 3    Associated Diagnoses: Encounter for medication refill      CONTOUR NEXT TEST test strip Pt checking blood sugar 2x per day      Global Inject Ease Lancets 30G MISC Use twice daily      !! insulin pen needle (32G X 4 MM) 32G X 4 MM miscellaneous Use one pen needles daily or as directed.  Qty: 200 each, Refills: 11    Associated Diagnoses: Type 2 diabetes mellitus treated with insulin (H)       !! - Potential duplicate medications found. Please discuss with provider.                Rationale for medication changes:      Lidocaine topical tid prn pain        Consults       SOCIAL WORK IP CONSULT  CARDIOLOGY IP CONSULT  GASTROENTEROLOGY IP CONSULT  DIABETES EDUCATION IP CONSULT  PHARMACY IP CONSULT  PHARMACY IP CONSULT  HOSPITALIST IP CONSULT  PAIN MANAGEMENT ADULT IP CONSULT  PHYSICAL THERAPY ADULT IP CONSULT    Immunizations given this encounter     Most Recent Immunizations   Administered Date(s) Administered     COVID-19,PF,Moderna 09/02/2021     COVID-19,PF,Moderna Booster 02/04/2022     Flu, Unspecified  10/23/2013     HEPA 07/11/2012     HepA, Unspecified 05/07/2013     HepA-Adult 05/07/2013     HepA-Peds, Unspecified 07/11/2012     HepB 04/03/2013     HepB, Unspecified 05/07/2013     HepB-Adult 05/07/2013     Influenza (IIV3) PF 10/23/2013     Influenza Vaccine IM > 6 months Valent IIV4 (Alfuria,Fluzone) 09/07/2021     Influenza Vaccine, 6+MO IM (QUADRIVALENT W/PRESERVATIVES) 10/19/2016     MMR 04/17/2012     Pneumo Conj 13-V (2010&after) 04/28/2016     Pneumococcal 23 valent 06/24/2020     TD (ADULT, 7+) 05/07/2013     Td (Adult), Adsorbed 11/25/2011     Tdap (Adacel,Boostrix) 04/17/2012     Tdap (Adult) Unspecified Formulation 05/07/2013     Varicella 07/11/2012           Anticoagulation Information      Recent INR results: No results for input(s): INR in the last 168 hours.  Warfarin doses (if applicable) or name of other anticoagulant: none      SIGNIFICANT IMAGING FINDINGS     Results for orders placed or performed during the hospital encounter of 05/04/22   CT Chest Pulmonary Embolism w Contrast    Impression    IMPRESSION:  1.  No pulmonary artery embolism or thoracic aortic dissection.  2.  No pneumonic consolidation or pleural effusion.   CT Abdomen Pelvis w Contrast    Impression    IMPRESSION:   1.  No abnormalities are seen to explain vomiting. Specifically, no bowel obstruction or inflammatory changes.   US Lower Extremity Venous Duplex Bilateral    Impression    IMPRESSION:  1.  No deep venous thrombosis in the bilateral lower extremities.   Echocardiogram Complete   Result Value Ref Range    LVEF  55-60%        SIGNIFICANT LABORATORY FINDINGS     Most Recent 3 CBC's:Recent Labs   Lab Test 05/07/22  0511 05/06/22  0426 05/05/22  0737   WBC 7.1 8.5 8.8   HGB 11.9 12.2 12.5   MCV 85 85 84    208 230     Most Recent 3 BMP's:Recent Labs   Lab Test 05/08/22  0759 05/08/22  0430 05/07/22  2102 05/07/22  0740 05/07/22  0511 05/06/22  0824 05/06/22  0426   NA  --  137  --   --  138  --  142    POTASSIUM  --  4.0  --   --  4.1  --  3.5   CHLORIDE  --  105  --   --  108*  --  108*   CO2  --  22  --   --  21*  --  25   BUN  --  11  --   --  12  --  10   CR  --  0.74  --   --  0.74  --  0.63   ANIONGAP  --  10  --   --  9  --  9   FREDY  --  9.2  --   --  8.6  --  9.0   * 269* 261*   < > 408*   < > 166*    < > = values in this interval not displayed.     Most Recent 2 LFT's:Recent Labs   Lab Test 22  0737 22  0908   AST 17 18   ALT 14 15   ALKPHOS 89 94   BILITOTAL 0.5 0.4       Cardiac Catheterization    Result Date: 2022    Dist RCA lesion is 25% stenosed.   Prox Cx lesion is 30% stenosed.   LAD stent widely patent      Echocardiogram Complete    Result Date: 2022  [id number]^LEROY^LIANA^[place] [address]  Name: FRANCIA MATTSON MRN: [id number] : 1970 Study Date: 2022 08:00 AM Age: 52 yrs Gender: Female Patient Location: [facility] Reason For Study: Chest Pain Ordering Physician: LIANA HARPER Performed By: SURAJ  BSA: 1.6 m2 Height: 62 in Weight: 129 lb HR: 80 ______________________________________________________________________________ Procedure Complete Echo Adult. Definity (NDC #57325-313) given intravenously. ______________________________________________________________________________ Interpretation Summary  Left ventricular size, wall motion and function are normal. The ejection fraction is 55-60%. There is borderline anterior, septal, and apical wall hypokinesis. Normal right ventricle size and systolic function. No hemodynamically significant valvular abnormalities on 2D or color flow imaging. ______________________________________________________________________________ Left Ventricle Left ventricular size, wall motion and function are normal. The ejection fraction is 55-60%. There is normal left ventricular wall thickness. Left ventricular diastolic function is normal. There is borderline  anterior, septal, and apical wall hypokinesis.  Right Ventricle Normal right ventricle size and systolic function.  Atria Normal left atrial size. Right atrial size is normal. There is no color Doppler evidence of an atrial shunt.  Mitral Valve Mitral valve leaflets appear normal. There is no evidence of mitral stenosis or clinically significant mitral regurgitation.  Tricuspid Valve Tricuspid valve leaflets appear normal. There is no evidence of tricuspid stenosis or clinically significant tricuspid regurgitation. Right ventricle systolic pressure estimate normal.  Aortic Valve Aortic valve leaflets appear normal. There is no evidence of aortic stenosis or clinically significant aortic regurgitation.  Pulmonic Valve The pulmonic valve is not well seen, but is grossly normal. This degree of valvular regurgitation is within normal limits.  Vessels The aorta root is normal. Normal size ascending aorta. IVC diameter <2.1 cm collapsing >50% with sniff suggests a normal RA pressure of 3 mmHg.  Pericardium There is no pericardial effusion.  ______________________________________________________________________________ MMode/2D Measurements & Calculations Ao root diam: 3.4 cm LA dimension: 3.1 cm asc Aorta Diam: 3.9 cm  LA/Ao: 0.91 LVOT diam: 2.3 cm LVOT area: 4.2 cm2 LA Volume Indexed (AL/bp): 16.0 ml/m2  Doppler Measurements & Calculations MV E max jonathan: 55.7 cm/sec MV A max jonathan: 62.1 cm/sec MV E/A: 0.90 MV dec slope: 256.1 cm/sec2 MV dec time: 0.22 sec Ao V2 max: 129.7 cm/sec Ao max P.0 mmHg Ao V2 mean: 85.7 cm/sec Ao mean PG: 3.4 mmHg Ao V2 VTI: 25.3 cm LUCERO(I,D): 3.7 cm2 LUCERO(V,D): 3.8 cm2 LV V1 max P.6 mmHg LV V1 max: 118.7 cm/sec LV V1 VTI: 22.3 cm SV(LVOT): 93.5 ml SI(LVOT): 58.9 ml/m2 PA acc time: 0.06 sec  AV Jonathan Ratio (DI): 0.92 LUCERO Index (cm2/m2): 2.3 E/E' av.1 Lateral E/e': 7.1 Medial E/e': 11.1  ______________________________________________________________________________ Report approved by: Raúl WALDEN  Mary Orozco 05/05/2022 09:01 AM       US Lower Extremity Venous Duplex Bilateral    Result Date: 5/7/2022  EXAM: US LOWER EXTREMITY VENOUS DUPLEX BILATERAL LOCATION: [facility] DATE/TIME: 5/7/2022 10:35 AM INDICATION: Bilateral leg pain. COMPARISON: None. TECHNIQUE: Venous Duplex ultrasound of bilateral lower extremities with and without compression, augmentation and duplex. Color flow and spectral Doppler with waveform analysis performed. FINDINGS: Exam includes the common femoral, femoral, popliteal veins as well as segmentally visualized deep calf veins and greater saphenous vein. RIGHT: No deep vein thrombosis. No superficial thrombophlebitis. No popliteal cyst. LEFT: No deep vein thrombosis. No superficial thrombophlebitis. No popliteal cyst.     IMPRESSION: 1.  No deep venous thrombosis in the bilateral lower extremities.    CT Abdomen Pelvis w Contrast    Result Date: 5/4/2022  EXAM: CT ABDOMEN PELVIS W CONTRAST LOCATION: [facility] DATE/TIME: 5/4/2022 10:36 AM INDICATION: Nausea and vomiting. COMPARISON: 09/22/2019 TECHNIQUE: CT scan of the abdomen and pelvis was performed following injection of IV contrast. Multiplanar reformats were obtained. Dose reduction techniques were used. CONTRAST: IsoVue 370 100mL FINDINGS: LOWER CHEST: Please refer to chest CTA report. HEPATOBILIARY: Normal. PANCREAS: Normal. SPLEEN: Normal. ADRENAL GLANDS: Normal. KIDNEYS/BLADDER: Normal. BOWEL: Bowel is normal caliber. No obstruction. Incidental descending duodenal diverticulum. No free fluid or inflammatory changes. Appendix is normal. LYMPH NODES: Normal. VASCULATURE: Moderate arterial calcifications. PELVIC ORGANS: Normal. MUSCULOSKELETAL: Diastases of  the midline rectus sheath throughout. No suspicious bone lesions.     IMPRESSION: 1.  No abnormalities are seen to explain vomiting. Specifically, no bowel obstruction or inflammatory changes.    CT Chest Pulmonary Embolism  w Contrast    Result Date: 5/4/2022  EXAM: CT CHEST PULMONARY EMBOLISM W CONTRAST LOCATION: [facility] DATE/TIME: 5/4/2022 10:34 AM INDICATION: chest pain COMPARISON: CT abdomen and pelvis the same date, CT chest exams 12/15/2021 and 8/3/2021 TECHNIQUE: CT chest pulmonary angiogram during arterial phase injection of IV contrast. Multiplanar reformats and MIP reconstructions were performed. Dose reduction techniques were used. CONTRAST: IsoVue 370 100mL FINDINGS: ANGIOGRAM CHEST: Pulmonary arteries are normal caliber and negative for pulmonary emboli. Thoracic aorta is negative for dissection. No CT evidence of right heart strain. LUNGS AND PLEURA: The central airways are clear. Mild dependent atelectasis. Stable mild apical and bibasilar scarring. Mild mosaic lung attenuation suggesting mild air trapping. Incidental upper right major fissural intrapulmonary lymph node image 58 series 6. MEDIASTINUM/AXILLAE: No thoracic adenopathy. Normal heart size and no pericardial effusion. CORONARY ARTERY CALCIFICATION: Previous intervention (stents or CABG). UPPER ABDOMEN: Normal. MUSCULOSKELETAL: Normal.     IMPRESSION: 1.  No pulmonary artery embolism or thoracic aortic dissection. 2.  No pneumonic consolidation or pleural effusion.      Discharge Orders        Home Care Referral      Reason for your hospital stay    Chest pain     Follow-up and recommended labs and tests     Follow up with primary care provider, Adrien Cardoza, within 3-4 days for hospital follow- up.  The following labs/tests are recommended: bmp and mag.    Follow up with Dr. Hirsch cardiology clinic---3 months     Activity    Your activity upon discharge: activity as tolerated     Diet    Follow this diet upon discharge: Orders Placed This Encounter      High Consistent Carb (75 g CHO per Meal) Diet       Examination   Physical Exam   Temp:  [97.2  F (36.2  C)-98.1  F (36.7  C)] 97.9  F (36.6  C)  Pulse:  [] 88  Resp:  [18]  18  BP: (107-128)/(67-82) 126/74  SpO2:  [94 %-100 %] 95 %  Wt Readings from Last 1 Encounters:   05/08/22 56.2 kg (123 lb 14.4 oz)       See today's note    Please see EMR for more detailed significant labs, imaging, consultant notes etc.    I, Whit Garcia MD, personally saw the patient today and spent greater than 30 minutes discharging this patient.    Whit Garcia MD  [facility]    CC:Adrien Cardoza

## 2022-05-08 NOTE — PLAN OF CARE
Goal Outcome Evaluation:    Pt reports chest pain which is feeling sharp and achy 4-5/10, this pain is constant but not new.  Bilateral leg pain as well. Schedule tylenol, gabapentin and lidocaine ointment applied on for comfort. Pt seemed to be sleeping comfortably overnight. No acute changes.

## 2022-05-08 NOTE — PROGRESS NOTES
[facility]    Medicine Progress Note - Hospitalist Service    Date of Admission:  5/4/2022    Assessment & Plan          52-year-old female with history of latent TB, asthma/COPD, hypertension, hyperthyroidism, anxiety, chronic abdominal pain, GERD, DM2, coronary artery disease, prior stroke and mood disorder presents with chest pain        1.Chest pain: Noted is history of CAD(hx of stent 2018) and reactive airways disease.  Patient presents with exertional chest pain for 4 days.  States that she ran out of her Plavix 4 days ago(per review of cards notes, were planning on stopping)-  -CTA neg pe, trops neg     -echo done 5/5/22  Left ventricular size, wall motion and function are normal. The ejection  fraction is 55-60%.  There is borderline anterior, septal, and apical wall hypokinesis.  Normal right ventricle size and systolic function.  No hemodynamically significant valvular abnormalities on 2D or color flow  imaging.  -concern for her symptoms to be angina for her and DM not controlled     -cath per cards done 5/5/22    -Dist RCA lesion is 25% stenosed.    Prox Cx lesion is 30% stenosed.    LAD stent widely patent    No stents needed        -with her cath looking this good-->per cards, asa, statin, metoprolol if tolerated--->follow up with Dr. Hirsch  in Cardiology in 4-6 mo  -had GI see as well and she already had extensive GI work up  -also had Pain team to see as well  -cp has improved but as of today is still there, just less intense     2.N/V  -cardiac work up done above  - Continue home famotidine, PPI and sucralfate  -GI does not feel repeating scopes needed with hx of them     3.Constipation  -supp on 5/6--had BM--now no abd pains  -senna/colace     4.History of COPD/asthma:Not hypoxic, CTA of the chest clear  -continue home inhalers and singulair  - Of note patient receives Dupixent dosing every 2 weeks     5.DM2:   - restart metformin(got CTA )  -lantus and novolog  -not  controlled--?if some of her pain ?gastroparesis     6.HTN: continue home hydrochlorothiazide and metoprolol      7.Chronic pain, mood disorder: Continue home Elavil, Flexeril, gabapentin  -had Pain team to see to re -eval here  -topical lidocaine is helping and will go home on it     8.History of prior stroke: Continue home aspirin and statin     9.History of hyperthyroidism: Patient not on any chronic therapy for this, last TSH checked January 2021 was normal.Will update now TSH, although was low, free t4 ok     10.Leg pains  -she told me first about this today--notes there years--bur both legs really bothering her  -checked ultrasound on both legs--neg  -get PT to eval her and ambulate today     11.DVT PPX: SCD +Hep     Code status:  Full Code            Diet: High Consistent Carb (75 g CHO per Meal) Diet    DVT Prophylaxis: Heparin SQ  Collins Catheter: Not present  Central Lines: None  Cardiac Monitoring: ACTIVE order. Indication: Chest pain/ ACS rule out (24 hours)  Code Status: Full Code      Disposition Plan   Expected Discharge: 05/08/2022     Anticipated discharge location:  Awaiting care coordination huddle  Delays: Home today       The patient's care was discussed with the Patient and Patient's Family.    Whit Garcia MD  Hospitalist Service  [facility]  Securely message with the Vocera Web Console (learn more here)  Text page via [employer] Paging/Directory           ______________________________________________________________________    Interval History   I used  phone for pt and family  Cp much better  No abd pain  Having bm's  Wants to go home      Data reviewed today: I reviewed all medications, new labs and imaging results over the last 24 hours. I personally reviewed no images or EKG's today.    Physical Exam   Vital Signs: Temp: 97.9  F (36.6  C) Temp src: Oral BP: 126/74 Pulse: 88   Resp: 18 SpO2: 95 % O2 Device: None (Room air)    Weight: 123 lbs 14.4  oz  Constitutional: awake, alert, cooperative and no apparent distress  Respiratory: No increased work of breathing, good air exchange, clear to auscultation bilaterally, no crackles or wheezing  Cardiovascular: Normal apical impulse, regular rate and rhythm, normal S1 and S2, no S3 or S4, and no murmur noted  GI: normal bowel sounds, soft, non-distended and non-tender  Skin: no bruising or bleeding  Musculoskeletal: no lower extremity pitting edema present  Neurologic: Mental Status Exam:  Level of Alertness:   awake  Neuropsychiatric: General: normal, calm and normal eye contact    Data   Recent Labs   Lab 05/08/22  0759 05/08/22  0430 05/07/22  2102 05/07/22  0740 05/07/22  0511 05/06/22  0824 05/06/22  0426 05/05/22  0837 05/05/22  0737 05/04/22  2144 05/04/22  0908   WBC  --   --   --   --  7.1  --  8.5  --  8.8  --  7.6   HGB  --   --   --   --  11.9  --  12.2  --  12.5  --  12.7   MCV  --   --   --   --  85  --  85  --  84  --  84   PLT  --   --   --   --  204  --  208  --  230  --  217   NA  --  137  --   --  138  --  142  --  141  --  139   POTASSIUM  --  4.0  --   --  4.1  --  3.5  --  3.8  --  3.8   CHLORIDE  --  105  --   --  108*  --  108*  --  106  --  106   CO2  --  22  --   --  21*  --  25  --  25  --  22   BUN  --  11  --   --  12  --  10  --  11  --  7*   CR  --  0.74  --   --  0.74  --  0.63  --  0.69  --  0.65   ANIONGAP  --  10  --   --  9  --  9  --  10  --  11   FREDY  --  9.2  --   --  8.6  --  9.0  --  9.1  --  9.3   * 269* 261*   < > 408*   < > 166*   < > 220*   < > 192*   ALBUMIN  --   --   --   --   --   --   --   --  3.3*  --  3.5   PROTTOTAL  --   --   --   --   --   --   --   --  6.7  --  6.9   BILITOTAL  --   --   --   --   --   --   --   --  0.5  --  0.4   ALKPHOS  --   --   --   --   --   --   --   --  89  --  94   ALT  --   --   --   --   --   --   --   --  14  --  15   AST  --   --   --   --   --   --   --   --  17  --  18   LIPASE  --   --   --   --   --   --   --   --   --    --  31    < > = values in this interval not displayed.     Recent Results (from the past 24 hour(s))   US Lower Extremity Venous Duplex Bilateral    Narrative    EXAM: US LOWER EXTREMITY VENOUS DUPLEX BILATERAL  LOCATION: [facility]  DATE/TIME: 5/7/2022 10:35 AM    INDICATION: Bilateral leg pain.  COMPARISON: None.  TECHNIQUE: Venous Duplex ultrasound of bilateral lower extremities with and without compression, augmentation and duplex. Color flow and spectral Doppler with waveform analysis performed.    FINDINGS: Exam includes the common femoral, femoral, popliteal veins as well as segmentally visualized deep calf veins and greater saphenous vein.     RIGHT: No deep vein thrombosis. No superficial thrombophlebitis. No popliteal cyst.    LEFT: No deep vein thrombosis. No superficial thrombophlebitis. No popliteal cyst.      Impression    IMPRESSION:  1.  No deep venous thrombosis in the bilateral lower extremities.

## 2022-05-08 NOTE — PROGRESS NOTES
Care Management Discharge Note    Discharge Date: 05/08/2022       Discharge Disposition: Home Care    Discharge Services:      Discharge DME:      Discharge Transportation: family or friend will provide    Private pay costs discussed: Not applicable    PAS Confirmation Code:    Patient/family educated on Medicare website which has current facility and service quality ratings:      Education Provided on the Discharge Plan:    Persons Notified of Discharge Plans: barby Cervantes    Patient/Family in Agreement with the Plan: yes    Handoff Referral Completed: Yes- requested they follow up on home care if unable to secure today     Additional Information:  See below         Manasa Chapman RN          Referrals sent for home PT    Message left (with ) for daughter in law Cervantes requesting call back     8:52 AM  Spoke to Billy. She is agreeable to home PT. Told her it might be difficult to secure a home care agency today because it is Sunday and patient has MA. Daughter says if unable to secure agency today, she will follow up with patients PCP . She is understanding. Says patient has had home PT in the past but does not recall name of agency

## 2022-05-08 NOTE — PLAN OF CARE
Physical Therapy Discharge Summary    Reason for therapy discharge:    Discharged to home with home therapy.    Progress towards therapy goal(s). See goals on Care Plan in [facility] electronic health record for goal details.  Goals partially met.  Barriers to achieving goals:   discharge from facility.    Therapy recommendation(s):    Continued therapy is recommended.  Rationale/Recommendations:  recommend home PT.     Dhara Alexander, PT  5/8/2022

## 2022-05-09 ENCOUNTER — TELEPHONE (OUTPATIENT)
Dept: CARDIOLOGY | Facility: CLINIC | Age: 52
End: 2022-05-09
Payer: COMMERCIAL

## 2022-05-09 NOTE — PROGRESS NOTES
You are scheduled for a PAP setup at [facility] on 5/20/22 at 10AM with ADAM. [facility] [address] [place] [address] [address], [place] [address] (in the [facility]). The [facility] contact number is [phone]. For [facility] customer service, please call [phone], Monday - Friday, 8:00 am - 4:30 pm. Please call if you need to make any changes to your appointment. Please contact your insurance company prior to appointment to confirm benefits for durable medical equipment.

## 2022-05-09 NOTE — TELEPHONE ENCOUNTER
[facility] Call Center    Phone Message    May a detailed message be left on voicemail: yes     Reason for Call: Other: patient called today to set up her annual follow up apt with [facility] but no template to quentin from. Patient is asking when will be a good time to call and quentin or if someone from the care team can call to [facility].     Action Taken: Message routed to:  [facility] ([facility] cardio    Travel Screening: Not Applicable

## 2022-05-10 ENCOUNTER — PATIENT OUTREACH (OUTPATIENT)
Dept: NURSING | Facility: CLINIC | Age: 52
End: 2022-05-10
Attending: INTERNAL MEDICINE
Payer: COMMERCIAL

## 2022-05-10 DIAGNOSIS — R52 PAIN: ICD-10-CM

## 2022-05-10 NOTE — PROGRESS NOTES
Clinic Care Coordination Contact  CCRN spoke with patient's daughter in law - Billy Amin today. Daughter in law declined CCC and declined home care at this time. Per DIL, they will follow up with Dr Cardoza - PCP on Thursday. No further assist needed from [facility].

## 2022-05-10 NOTE — TELEPHONE ENCOUNTER
Pt is not an [facility] patient. Pt is cared for by Dr. Kramer. Pt needs [place] [facility] [place] due in September-November. CMM,Rn

## 2022-05-12 ENCOUNTER — OFFICE VISIT (OUTPATIENT)
Dept: FAMILY MEDICINE | Facility: CLINIC | Age: 52
End: 2022-05-12
Payer: COMMERCIAL

## 2022-05-12 VITALS
SYSTOLIC BLOOD PRESSURE: 116 MMHG | BODY MASS INDEX: 22.63 KG/M2 | RESPIRATION RATE: 20 BRPM | TEMPERATURE: 98 F | OXYGEN SATURATION: 94 % | WEIGHT: 123 LBS | HEIGHT: 62 IN | HEART RATE: 102 BPM | DIASTOLIC BLOOD PRESSURE: 78 MMHG

## 2022-05-12 DIAGNOSIS — E11.9 TYPE 2 DIABETES MELLITUS TREATED WITH INSULIN (H): ICD-10-CM

## 2022-05-12 DIAGNOSIS — F33.9 RECURRENT MAJOR DEPRESSIVE DISORDER, REMISSION STATUS UNSPECIFIED (H): Chronic | ICD-10-CM

## 2022-05-12 DIAGNOSIS — Z09 HOSPITAL DISCHARGE FOLLOW-UP: Primary | ICD-10-CM

## 2022-05-12 DIAGNOSIS — R05.9 COUGH: ICD-10-CM

## 2022-05-12 DIAGNOSIS — Z76.0 ENCOUNTER FOR MEDICATION REFILL: ICD-10-CM

## 2022-05-12 DIAGNOSIS — R07.9 CHEST PAIN, UNSPECIFIED TYPE: ICD-10-CM

## 2022-05-12 DIAGNOSIS — Z79.4 TYPE 2 DIABETES MELLITUS TREATED WITH INSULIN (H): ICD-10-CM

## 2022-05-12 PROCEDURE — 99214 OFFICE O/P EST MOD 30 MIN: CPT | Performed by: FAMILY MEDICINE

## 2022-05-12 RX ORDER — DEXTROMETHORPHAN POLISTIREX 30 MG/5ML
60 SUSPENSION ORAL 2 TIMES DAILY PRN
Qty: 200 ML | Refills: 0 | Status: SHIPPED | OUTPATIENT
Start: 2022-05-12 | End: 2022-07-05

## 2022-05-12 NOTE — TELEPHONE ENCOUNTER
With the help of [facility] , from outside agency via phone PC placed. Reviewed recommendation. Writer informed prefers to schedule with Dr. Hirsch, not Dr. Kramer. Has clinic number and will call when OV due. No new concerns. ROBBI,Rn

## 2022-05-12 NOTE — PROGRESS NOTES
Hospital Follow-up Visit:    Hospital/Nursing Home/IP Rehab Facility: [facility]  Date of Admission: May 4, 2022  Date of Discharge: May 8, 2022  Reason(s) for Admission: Chest Pain      Was your hospitalization related to COVID-19? No   Problems taking medications regularly:  None  Medication changes since discharge: None  Problems adhering to non-medication therapy:  None    Summary of hospitalization:  [facility] hospital discharge summary reviewed  Diagnostic Tests/Treatments reviewed.  Needs to schedule Cardiology follow up appointment . Daughter in law will call   Other Healthcare Providers Involved in Patient s Care:         None  Update since discharge: stable.   Post Discharge Medication Reconciliation: discharge medications reconciled, continue medications without change.  Plan of care communicated with patient and family     ASSESMENT AND PLAN:  Diagnoses and all orders for this visit:  Hospital discharge follow-up  Was seen in the hospital due to chest pain.  Symptoms improved.    Chest pain, unspecified type  Daughter-in-law will call to make an appointment to follow-up with cardiology.  Was not sent home with Plavix, she is still discussed with cardiology.  Currently taking aspirin.    Cough  Negative COVID test during recent hospital stay.  - dextromethorphan (DELSYM) 30 MG/5ML liquid; Take 10 mLs (60 mg) by mouth 2 times daily as needed for cough    Type 2 diabetes mellitus treated with insulin (H)  Continue Lantus 10 units in the morning and 20 units at night.  Instructed to use NovoLog 10 units before each meal. (Currently using only once a day)  - insulin aspart (NOVOLOG PEN) 100 UNIT/ML pen; 10 U BID before meals    Recurrent major depressive disorder, remission status unspecified (H)  Stable.    This transcription uses voice recognition software, which may contain typographical errors.      SUBJECTIVE: Kulwant Amin is here for hospital discharge follow-up.   She was in the hospital from 5/4/2022 -5/8/2022 due to chest pain.  CTA was negative for PE.  Serial troponins were negative.  Cath done on 5/5/2022, no stents needed per review.  She was recommended to follow-up with cardiology in 4 to 6 months.  Daughter-in-law states she will call for appointment.  She is not on Plavix currently, only taking aspirin.  She is also complaining of cough.  Influenza a/B and COVID test were negative during recent hospital stay.  She is requesting cough syrup.  Daughter-in-law states blood sugar readings are improving.  Fasting blood sugar in the 120s to 150 range with postprandial sugar 200s and 300s.  She is currently using Lantus 10 units in the morning and 20 units at night along with NovoLog 10 units once a day with lunch.  No recent low blood sugar with hypoglycemic symptoms.  No acute nausea, vomiting or abdominal pain.  No new concerns since last hospital visit.    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     Microcytic anemia 11/17/2019     S/P coronary artery stent placement 02/02/2018     TB lung, latent     9 mos INH     Patient Active Problem List   Diagnosis     Pulmonary nodule, right     Environmental allergies     TB lung, latent     COPD (chronic obstructive pulmonary disease)/Asthma      HTN (hypertension)     Hyperthyroidism     Cataracts, bilateral     Corneal opacity     Irregular astigmatism     Anxiety     Chronic nonintractable headache,  "unspecified headache type     Coronary artery disease due to lipid rich plaque     Dizziness     Hyperlipidemia     Moderate major depression (H)     Moderate persistent asthma     Type 2 diabetes mellitus treated without insulin (H)     Unspecified visual loss     GERD (gastroesophageal reflux disease)     Dental caries     H/O arterial ischemic stroke     Constipation     Dysphagia     Chronic abdominal pain     Insomnia     FLACO (obstructive sleep apnea)- mild (AHI 14)     Chest pain     Chest pain, unspecified type       Allergies:  No Known Allergies    History   Smoking Status     Former Smoker     Packs/day: 1.00     Years: 30.00     Types: Cigarettes, Cigarettes, Cigarettes     Quit date: 1/1/2003   Smokeless Tobacco     Never Used     Comment: No passive exposure       Review of systems otherwise negative except as listed in HPI.   History   Smoking Status     Former Smoker     Packs/day: 1.00     Years: 30.00     Types: Cigarettes, Cigarettes, Cigarettes     Quit date: 1/1/2003   Smokeless Tobacco     Never Used     Comment: No passive exposure       OBJECTICE: /78 (BP Location: Right arm, Patient Position: Sitting, Cuff Size: Adult Regular)   Pulse 102   Temp 98  F (36.7  C) (Oral)   Resp 20   Ht 1.575 m (5' 2\")   Wt 55.8 kg (123 lb)   SpO2 94%   BMI 22.50 kg/m      DATA REVIEWED:  Additional History from Old Records Summarized (2):   Radiology Tests Summarized or Ordered (1):   Labs Reviewed or Ordered (1):       GEN-alert,  in no apparent distress.  HEENT- neck is supple.  CV-regular rate and rhythm with no murmur.   RESP-lungs clear to auscultation .  ABDOMEN- Soft , not tender.  EXTREM- No open lesions or ulcers. Sensation intact.   SKIN-normal        Adrien Cardoza MD   5/12/2022      "

## 2022-05-23 ENCOUNTER — TELEPHONE (OUTPATIENT)
Dept: PULMONOLOGY | Facility: OTHER | Age: 52
End: 2022-05-23
Payer: COMMERCIAL

## 2022-05-23 DIAGNOSIS — J45.901 ASTHMA EXACERBATION: Primary | ICD-10-CM

## 2022-05-23 RX ORDER — PREDNISONE 20 MG/1
TABLET ORAL
Qty: 10 TABLET | Refills: 0 | Status: SHIPPED | OUTPATIENT
Start: 2022-05-23 | End: 2022-06-20

## 2022-05-23 NOTE — TELEPHONE ENCOUNTER
Phone call from patient's daughter-in-law, Billy.  States that Kulwant has increased cough with some wheezing.  No other symptoms. States that the prednisone she was prescribed last month was helpful for her cough and wheezing.  Has appt for follow up scheduled with dr. Duong on 7/14.     Will send Rx for her action plan:  Prednisone 40mg daily x 5 days.

## 2022-05-24 DIAGNOSIS — M54.9 BACK PAIN: ICD-10-CM

## 2022-05-24 DIAGNOSIS — Z76.0 ENCOUNTER FOR MEDICATION REFILL: Primary | ICD-10-CM

## 2022-05-24 RX ORDER — ACETAMINOPHEN 500 MG
TABLET ORAL
Qty: 100 TABLET | Refills: 10 | Status: SHIPPED | OUTPATIENT
Start: 2022-05-24 | End: 2023-03-21

## 2022-05-24 NOTE — TELEPHONE ENCOUNTER
Reason for Call:  Medication or medication refill:    Do you use a [facility]? no      Name of the pharmacy and phone number for the current request:  [facility] @[phone]    Name of the medication requested: Tylenol     Can we leave a detailed message on this number? NO    Phone number patient can be reached at: Home number on file [phone] (home)    Best Time: anytime    Call taken on 5/24/2022 at 12:17 PM by Lisa Martinez CMA. TC

## 2022-05-25 ENCOUNTER — DOCUMENTATION ONLY (OUTPATIENT)
Dept: SLEEP MEDICINE | Facility: CLINIC | Age: 52
End: 2022-05-25
Payer: COMMERCIAL

## 2022-05-25 DIAGNOSIS — G47.33 OSA (OBSTRUCTIVE SLEEP APNEA): Primary | ICD-10-CM

## 2022-05-25 NOTE — PROGRESS NOTES
Patient was offered choice of vendor and chose [facility].  Patient Kulwant Amin was set up at [facility] on May 25, 2022. Patient received a Resmed Airsense 11 Pressures were set at 6-15 cm H2O.   Patient s ramp is 5 cm H2O for Auto and FLEX/EPR is EPR, 2.  Patient received a Resmed Mask name: Airfit N20  Nasal mask size Medium, heated tubing and heated humidifier.  Patient does need to meet compliance. Patient did waive  services and daughter was present to interpret.  Dhara Youngblood

## 2022-05-29 DIAGNOSIS — Z91.09 ENVIRONMENTAL ALLERGIES: Primary | ICD-10-CM

## 2022-05-31 ENCOUNTER — DOCUMENTATION ONLY (OUTPATIENT)
Dept: SLEEP MEDICINE | Facility: CLINIC | Age: 52
End: 2022-05-31
Payer: COMMERCIAL

## 2022-05-31 DIAGNOSIS — G47.33 OSA (OBSTRUCTIVE SLEEP APNEA): Primary | ICD-10-CM

## 2022-05-31 RX ORDER — LORATADINE 10 MG/1
TABLET ORAL
Qty: 30 TABLET | Refills: 3 | Status: SHIPPED | OUTPATIENT
Start: 2022-05-31 | End: 2022-09-19

## 2022-05-31 NOTE — PROGRESS NOTES
3 day Sleep therapy management telephone visit    Diagnostic AHI: 14.2  PSG    Confirmed with patient at time of call- Yes Patient is still interested in STM service       Subjective measures:  Spoke with patients family Kulwant doesn't speak English.  They didn't have any questions or concerns.         Objective data     Order Settings for PAP  CPAP min 6    CPAP max 15             Device settings from machine CPAP min 6.0     CPAP max 15.0           EPR Setting TWO    RESMED soft response  OFF     Assessment: Nighty usage most nights over four hours      Action plan: Patient to have 14 day STM visit. Patient has a follow up visit scheduled:   Follow up visit scheduled during this STM visit.     Replacement device: No  STM ordered by provider: Yes     Total time spent on accessing and  interpreting remote patient PAP therapy data  10 minutes    Total time spent counseling, coaching  and reviewing PAP therapy data with patient  4 minutes    [id number] no

## 2022-06-15 DIAGNOSIS — J44.9 CHRONIC OBSTRUCTIVE PULMONARY DISEASE, UNSPECIFIED COPD TYPE (H): Primary | ICD-10-CM

## 2022-06-15 RX ORDER — ALBUTEROL SULFATE 0.83 MG/ML
2.5 SOLUTION RESPIRATORY (INHALATION) EVERY 6 HOURS PRN
Qty: 90 ML | Refills: 11 | Status: SHIPPED | OUTPATIENT
Start: 2022-06-15 | End: 2023-08-07

## 2022-06-20 ENCOUNTER — TELEPHONE (OUTPATIENT)
Dept: PULMONOLOGY | Facility: OTHER | Age: 52
End: 2022-06-20
Payer: COMMERCIAL

## 2022-06-20 DIAGNOSIS — J45.901 ASTHMA EXACERBATION: ICD-10-CM

## 2022-06-20 RX ORDER — PREDNISONE 20 MG/1
TABLET ORAL
Qty: 10 TABLET | Refills: 0 | Status: SHIPPED | OUTPATIENT
Start: 2022-06-20 | End: 2022-08-26

## 2022-06-20 NOTE — TELEPHONE ENCOUNTER
Daughter in law Billy called today to inform Kulwant is having increase in cough and wheezing. Will order actions plan of prednisone 40mg x 5 days. She will call us back if she has more issues. She has an appt 7/14/22.

## 2022-06-28 DIAGNOSIS — Z91.09 ENVIRONMENTAL ALLERGIES: ICD-10-CM

## 2022-06-28 DIAGNOSIS — Z76.0 ENCOUNTER FOR MEDICATION REFILL: Primary | ICD-10-CM

## 2022-06-28 RX ORDER — LANCETS 30 GAUGE
EACH MISCELLANEOUS
Qty: 100 EACH | Refills: 3 | Status: SHIPPED | OUTPATIENT
Start: 2022-06-28 | End: 2023-09-13

## 2022-06-28 RX ORDER — MONTELUKAST SODIUM 10 MG/1
10 TABLET ORAL AT BEDTIME
Qty: 30 TABLET | Refills: 6 | Status: SHIPPED | OUTPATIENT
Start: 2022-06-28 | End: 2023-02-10

## 2022-07-02 ENCOUNTER — HOSPITAL ENCOUNTER (EMERGENCY)
Facility: HOSPITAL | Age: 52
Discharge: HOME | End: 2022-07-02
Attending: EMERGENCY MEDICINE | Admitting: EMERGENCY MEDICINE
Payer: COMMERCIAL

## 2022-07-02 ENCOUNTER — APPOINTMENT (OUTPATIENT)
Dept: RADIOLOGY | Facility: HOSPITAL | Age: 52
End: 2022-07-02
Attending: EMERGENCY MEDICINE
Payer: COMMERCIAL

## 2022-07-02 VITALS
RESPIRATION RATE: 24 BRPM | TEMPERATURE: 98.1 F | OXYGEN SATURATION: 99 % | HEART RATE: 90 BPM | DIASTOLIC BLOOD PRESSURE: 69 MMHG | WEIGHT: 126 LBS | BODY MASS INDEX: 23.05 KG/M2 | SYSTOLIC BLOOD PRESSURE: 120 MMHG

## 2022-07-02 DIAGNOSIS — N89.8 VAGINAL ITCHING: ICD-10-CM

## 2022-07-02 DIAGNOSIS — R05.9 COUGH: ICD-10-CM

## 2022-07-02 DIAGNOSIS — R07.9 CHEST PAIN, UNSPECIFIED TYPE: ICD-10-CM

## 2022-07-02 LAB
ALBUMIN UR-MCNC: NEGATIVE MG/DL
ANION GAP SERPL CALCULATED.3IONS-SCNC: 10 MMOL/L (ref 5–18)
APPEARANCE UR: CLEAR
APTT PPP: 28 SECONDS (ref 22–38)
BACTERIA #/AREA URNS HPF: ABNORMAL /HPF
BILIRUB UR QL STRIP: NEGATIVE
BNP SERPL-MCNC: 12 PG/ML (ref 0–80)
BUN SERPL-MCNC: 7 MG/DL (ref 8–22)
CALCIUM SERPL-MCNC: 9.5 MG/DL (ref 8.5–10.5)
CHLORIDE BLD-SCNC: 102 MMOL/L (ref 98–107)
CLUE CELLS: NORMAL
CO2 SERPL-SCNC: 24 MMOL/L (ref 22–31)
COLOR UR AUTO: COLORLESS
CREAT SERPL-MCNC: 0.72 MG/DL (ref 0.6–1.1)
ERYTHROCYTE [DISTWIDTH] IN BLOOD BY AUTOMATED COUNT: 14.9 % (ref 10–15)
GFR SERPL CREATININE-BSD FRML MDRD: >90 ML/MIN/1.73M2
GLUCOSE BLD-MCNC: 254 MG/DL (ref 70–125)
GLUCOSE BLDC GLUCOMTR-MCNC: 266 MG/DL (ref 70–99)
GLUCOSE UR STRIP-MCNC: 300 MG/DL
HCT VFR BLD AUTO: 39.5 % (ref 35–47)
HGB BLD-MCNC: 12.5 G/DL (ref 11.7–15.7)
HGB UR QL STRIP: NEGATIVE
HOLD SPECIMEN: NORMAL
INR PPP: 1.03 (ref 0.85–1.15)
KETONES UR STRIP-MCNC: NEGATIVE MG/DL
LEUKOCYTE ESTERASE UR QL STRIP: ABNORMAL
MCH RBC QN AUTO: 25.9 PG (ref 26.5–33)
MCHC RBC AUTO-ENTMCNC: 31.6 G/DL (ref 31.5–36.5)
MCV RBC AUTO: 82 FL (ref 78–100)
NITRATE UR QL: NEGATIVE
PH UR STRIP: 5 [PH] (ref 5–7)
PLATELET # BLD AUTO: 178 10E3/UL (ref 150–450)
POTASSIUM BLD-SCNC: 3.4 MMOL/L (ref 3.5–5)
RBC # BLD AUTO: 4.82 10E6/UL (ref 3.8–5.2)
RBC URINE: 0 /HPF
SODIUM SERPL-SCNC: 136 MMOL/L (ref 136–145)
SP GR UR STRIP: 1 (ref 1–1.03)
SQUAMOUS EPITHELIAL: <1 /HPF
TRICHOMONAS, WET PREP: NORMAL
TROPONIN I SERPL-MCNC: <0.01 NG/ML (ref 0–0.29)
UROBILINOGEN UR STRIP-MCNC: <2 MG/DL
WBC # BLD AUTO: 8.2 10E3/UL (ref 4–11)
WBC URINE: <1 /HPF
WBC'S/HIGH POWER FIELD, WET PREP: NORMAL
YEAST, WET PREP: NORMAL

## 2022-07-02 PROCEDURE — 250N000009 HC RX 250: Performed by: EMERGENCY MEDICINE

## 2022-07-02 PROCEDURE — 258N000003 HC RX IP 258 OP 636: Performed by: EMERGENCY MEDICINE

## 2022-07-02 PROCEDURE — 85014 HEMATOCRIT: CPT | Performed by: EMERGENCY MEDICINE

## 2022-07-02 PROCEDURE — 82310 ASSAY OF CALCIUM: CPT | Performed by: EMERGENCY MEDICINE

## 2022-07-02 PROCEDURE — 250N000013 HC RX MED GY IP 250 OP 250 PS 637: Performed by: EMERGENCY MEDICINE

## 2022-07-02 PROCEDURE — 85610 PROTHROMBIN TIME: CPT | Performed by: EMERGENCY MEDICINE

## 2022-07-02 PROCEDURE — 87210 SMEAR WET MOUNT SALINE/INK: CPT | Performed by: EMERGENCY MEDICINE

## 2022-07-02 PROCEDURE — 94640 AIRWAY INHALATION TREATMENT: CPT

## 2022-07-02 PROCEDURE — 85730 THROMBOPLASTIN TIME PARTIAL: CPT | Performed by: EMERGENCY MEDICINE

## 2022-07-02 PROCEDURE — 81001 URINALYSIS AUTO W/SCOPE: CPT | Performed by: EMERGENCY MEDICINE

## 2022-07-02 PROCEDURE — 96360 HYDRATION IV INFUSION INIT: CPT

## 2022-07-02 PROCEDURE — 99284 EMERGENCY DEPT VISIT MOD MDM: CPT | Mod: 25

## 2022-07-02 PROCEDURE — 71046 X-RAY EXAM CHEST 2 VIEWS: CPT

## 2022-07-02 PROCEDURE — 999N000157 HC STATISTIC RCP TIME EA 10 MIN

## 2022-07-02 PROCEDURE — 84484 ASSAY OF TROPONIN QUANT: CPT | Performed by: EMERGENCY MEDICINE

## 2022-07-02 PROCEDURE — 36415 COLL VENOUS BLD VENIPUNCTURE: CPT | Performed by: EMERGENCY MEDICINE

## 2022-07-02 PROCEDURE — 83880 ASSAY OF NATRIURETIC PEPTIDE: CPT | Performed by: EMERGENCY MEDICINE

## 2022-07-02 RX ORDER — IPRATROPIUM BROMIDE AND ALBUTEROL SULFATE 2.5; .5 MG/3ML; MG/3ML
3 SOLUTION RESPIRATORY (INHALATION) ONCE
Status: COMPLETED | OUTPATIENT
Start: 2022-07-02 | End: 2022-07-02

## 2022-07-02 RX ORDER — POTASSIUM CHLORIDE 1500 MG/1
20 TABLET, EXTENDED RELEASE ORAL ONCE
Status: COMPLETED | OUTPATIENT
Start: 2022-07-02 | End: 2022-07-02

## 2022-07-02 RX ADMIN — SODIUM CHLORIDE 500 ML: 9 INJECTION, SOLUTION INTRAVENOUS at 20:51

## 2022-07-02 RX ADMIN — IPRATROPIUM BROMIDE AND ALBUTEROL SULFATE 3 ML: 2.5; .5 SOLUTION RESPIRATORY (INHALATION) at 20:19

## 2022-07-02 RX ADMIN — POTASSIUM CHLORIDE 20 MEQ: 1500 TABLET, EXTENDED RELEASE ORAL at 21:04

## 2022-07-03 NOTE — PROGRESS NOTES
ED RESPIRATORY CARE NOTE     BS diminished with faint expiratory wheeze, gave Duoneb treatment x1,  BS post treatment diminished with improve aeration.      BP (!) 149/81   Pulse 80   Temp 98.1  F (36.7  C) (Oral)   Resp 22   Wt 57.2 kg (126 lb)   SpO2 95%   BMI 23.05 kg/m          Hu Sawyer, RT

## 2022-07-03 NOTE — ED PROVIDER NOTES
"  Emergency Department Encounter     Evaluation Date & Time:   2022  7:18 PM    CHIEF COMPLAINT:  Chest Pain      Triage Note:  3 days of chills, cp described as tightness, cough, and \"face itching\" with family noticing some red bumps on bilateral cheeks; had prednisone dosed from pulmonary clinic that she is \"almost done with\".     Triage Assessment     Row Name 22       Triage Assessment (Adult)    Airway WDL WDL       Respiratory WDL    Respiratory WDL X  sob       Skin Circulation/Temperature WDL    Skin Circulation/Temperature WDL WDL       Cardiac WDL    Cardiac WDL X  chest tightness       Peripheral/Neurovascular WDL    Peripheral Neurovascular WDL WDL       Cognitive/Neuro/Behavioral WDL    Cognitive/Neuro/Behavioral WDL WDL                    Impression and Plan       FINAL IMPRESSION:    ICD-10-CM    1. Chest pain, unspecified type  R07.9 [facility]  Referral   2. Vaginal itching  N89.8          ED COURSE & MEDICAL DECISION MAKIN:32 PM I met with the patient to gather history and to perform my initial exam. I discussed the plan for care while in the Emergency Department. PPE (gown, gloves, N95 mask) was worn during patient encounters.     52 year old female, history of CAD s/p stent placement x 2, DM II, HTN, HLD, COPD, FLACO, hyperthyroidism, latent TB, GERD and anxiety, who presents for evaluation of generalized chest tightness radiating to her back, which has been constant x 3 days. This pain feels different than the pain she had when she required coronary stent placement. She has had nausea without vomiting. She reports chronic shortness of breath that is at baseline with no lightheadedness or diaphroesis.     She does report cough and recently finished a prednisone burst without improvement; no associated URI symptoms or fevers.     She also reports vaginal itching.     On exam, she has RRR without significant murmur.  She has scattered expiratory wheezes without " respiratory distress, rhonchi or rales.    Patient placed on cardiac monitor, IV access established and blood sent for labs.    EKG performed and demonstrated NSR with LAFB and non-specific T wave changes (similar to prior EKG) and no ST-T wave elevation consistent with ACS or pericarditis.  Troponin WNL (<0.01); a single, normal troponin is reassuring that symptoms are not secondary to ACS given that they have been ongoing for 3 days and I do not think serial troponin testing is indicated.    CXR performed and demonstrated calcified granuloma RLL laterally; lungs are otherwise clear; no adenopathy or effusion; normal cardiac size and pulmonary vascularity; coronary artery stents; atherosclerotic aorta.     Labs otherwise remarkable for no leukocytosis or anemia. She has mild hypokalemia (K+ 3.4) - replaced po with normal renal function.  She is hyperglycemic (serum glucose 254) without associated acidosis, elevated anion gap or ketosis to suggest DKA for which she was given judicious IVF.    UA without definitive infection with 25 LE and few bacteria.  Wet prep was negative. Consider vaginitis as etiology of her itching.    Patient is comfortable with discharge to home.  Given her cardiac risk factors, a referral for [facility] was placed for this upcoming week for follow-up.  She also was advised to follow-up with her PCP if she has ongoing vaginal itching. Return precautions provided.  Patient stable throughout ED course.        At the conclusion of the encounter I discussed the results of all the tests and the disposition. The questions were answered. The patient and family acknowledged understanding and were agreeable with the care plan.        MEDICATIONS GIVEN IN THE EMERGENCY DEPARTMENT:  Medications   ipratropium - albuterol 0.5 mg/2.5 mg/3 mL (DUONEB) neb solution 3 mL (3 mLs Nebulization Given 7/2/22 2019)   potassium chloride ER (KLOR-CON M) CR tablet 20 mEq (20 mEq Oral Given 7/2/22  2104)   0.9% sodium chloride BOLUS (0 mLs Intravenous Stopped 7/2/22 2205)       NEW PRESCRIPTIONS STARTED AT TODAY'S ED VISIT:  Discharge Medication List as of 7/2/2022 10:07 PM          HPI     HPI     Due to language barrier, history was obtained with assistance from the patient's daughter; they declined professional [facility] .    Kulwant Amin is a 52 year old female, history of CAD s/p stent placement x 2, DM II, HTN, HLD, COPD, FLACO, hyperthyroidism, latent TB, GERD and anxiety, who presents to this ED by private vehicle with daughter for evaluation of chest pain.     The pain started 3 days ago and has been constant since onset. The pain is generalized across her chest with radiation to her mid-back; no radiation into either arm, her neck or jaw. The pain feels like a tightness in nature and is worse with walking. She reports that the pain feels different than the chest pain she had when she required stent placement. She reports chronic shortness of breath due to her asthma, which is at baseline. She has associated nausea without vomiting. No lightheadedness or diaphoresis.     She also reports coughing and is concerned that the pain and symptoms are secondary to pneumonia. Her pulmonologist called in a prescription for a 5 day course of Prednisone on 6/28 (~4 days ago) without improvement in symptoms. No associated fevers.     She reports chronic headaches. She also reports vaginal itching. She denies abdominal pain, diarrhea or other concerns.     Per chart review, on 5/4 (~2 months ago), patient was admitted to [facility] for chest pain. Echo completed on 5/5 (~2 months ago) revealed borderline anterior, septal, and apical wall hypokinesis. Left ventricular size, wall motion, and function are normal. The ejection fraction of 55-60%. Normal right ventricle size and systolic function. A catheterization was done per cardiology recommendation. They noted a distal RCA lesion is 25% stenosed.  Proximal Cx lesion is 30% stenosed. LAD stent widely patent. No stents were required. They wanted patient on aspirin, statin, and metoprolol with follow-up in 4-6 months. No abnormalities were found in imaging. Patient was advised to follow-up with PCP within 3-4 days following discharge. Patient was discharged on 5/8 (~2 months ago) in stable condition.    REVIEW OF SYSTEMS:  All other systems reviewed and are negative.      Medical History     Past Medical History:   Diagnosis Date     Acute asthma exacerbation 01/06/2020     Anxiety      Arthritis      Asthma exacerbation 11/19/2015     Chronic obstructive pulmonary disease, unspecified COPD type (H)      COPD (chronic obstructive pulmonary disease) (H)      Coronary artery disease due to lipid rich plaque      Depression      Epigastric pain 12/15/2021     Essential hypertension      GERD (gastroesophageal reflux disease)      Infection due to 2019 novel coronavirus 01/03/2022     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     Microcytic anemia 11/17/2019     S/P coronary artery stent placement 02/02/2018     TB lung, latent        Past Surgical History:   Procedure Laterality Date     CORONARY STENT PLACEMENT  2018     CV CORONARY ANGIOGRAM N/A 1/25/2018    Procedure: Coronary Angiogram;  Surgeon: Angelo Serrano MD;  Location: [facility] Cath Lab;  Service:      CV CORONARY ANGIOGRAM N/A 5/5/2022    Procedure: Coronary Angiogram;  Surgeon: Irwin Cano MD;  Location: [facility] CATH LAB CV     CV CORONARY ANGIOGRAM  5/5/2022    Procedure: ;  Surgeon: Irwin Cano MD;  Location: [facility] CATH LAB CV     MI ESOPHAGOGASTRODUODENOSCOPY TRANSORAL DIAGNOSTIC N/A 12/10/2018    Procedure: ESOPHAGOGASTRODUODENOSCOPY (EGD);  Surgeon: Eddie Renteria MD;  Location: [facility] GI;  Service: Gastroenterology     MI ESOPHAGOGASTRODUODENOSCOPY TRANSORAL DIAGNOSTIC N/A 12/3/2020    Procedure: ESOPHAGOGASTRODUODENOSCOPY (EGD) with biospies ;  Surgeon: Avi Crow  MD;  Location: [facility];  Service: Gastroenterology     [facility] COLONOSCOPY W/WO BRUSH/WASH N/A 12/10/2018    Procedure: COLONOSCOPY with polypectomy using biopsy forceps;  Surgeon: Eddie Renteria MD;  Location: [facility];  Service: Gastroenterology       Family History   Problem Relation Age of Onset     Other - See Comments Mother          of an intestinal problem     Ulcers Father          of gastritis     Breast Cancer No family hx of        Social History     Tobacco Use     Smoking status: Former Smoker     Packs/day: 1.00     Years: 30.00     Pack years: 30.00     Types: Cigarettes, Cigarettes, Cigarettes     Quit date: 2003     Years since quittin.5     Smokeless tobacco: Never Used     Tobacco comment: No passive exposure   Substance Use Topics     Alcohol use: No     Drug use: No       acetaminophen (TYLENOL) 500 MG tablet  albuterol (PROAIR HFA/PROVENTIL HFA/VENTOLIN HFA) 108 (90 Base) MCG/ACT inhaler  albuterol (PROVENTIL) (2.5 MG/3ML) 0.083% neb solution  ALLERGY RELIEF 10 MG tablet  amitriptyline (ELAVIL) 10 MG tablet  ASPIRIN LOW DOSE 81 MG EC tablet  atorvastatin (LIPITOR) 80 MG tablet  B-D U/F 31G X 8 MM insulin pen needle  Continuous Blood Gluc  (FREESTYLE MONICA 14 DAY READER) MICHAEL  Continuous Blood Gluc Sensor (FREESTYLE MONICA 14 DAY SENSOR) MISC  CONTOUR NEXT TEST test strip  cyclobenzaprine (FLEXERIL) 5 MG tablet  dextromethorphan (DELSYM) 30 MG/5ML liquid  Dupilumab (DUPIXENT) 300 MG/2ML SOPN  famotidine (PEPCID) 20 MG tablet  fluticasone-vilanterol (BREO ELLIPTA) 200-25 MCG/INH inhaler  gabapentin (NEURONTIN) 300 MG capsule  Global Inject Ease Lancets 30G MISC  guaiFENesin (ROBITUSSIN) 100 MG/5ML liquid  hydrochlorothiazide (MICROZIDE) 12.5 MG capsule  hydrocortisone (CORTAID) 1 % external cream  insulin aspart (NOVOLOG PEN) 100 UNIT/ML pen  Insulin Aspart FlexPen 100 UNIT/ML SOPN  insulin glargine (LANTUS PEN) 100 UNIT/ML pen  insulin pen needle (32G X 4 MM)  32G X 4 MM miscellaneous  ipratropium - albuterol 0.5 mg/2.5 mg/3 mL (DUONEB) 0.5-2.5 (3) MG/3ML neb solution  lidocaine (XYLOCAINE) 5 % external ointment  meclizine (ANTIVERT) 25 MG tablet  metFORMIN (GLUCOPHAGE) 1000 MG tablet  metoprolol tartrate (LOPRESSOR) 25 MG tablet  montelukast (SINGULAIR) 10 MG tablet  omeprazole (PRILOSEC) 40 MG DR capsule  Polyethylene Glycol 3350 (PEG 3350) 17 GM/SCOOP POWD  polyvinyl alcohol (ARTIFICIAL TEARS) 1.4 % ophthalmic solution  predniSONE (DELTASONE) 20 MG tablet  sucralfate (CARAFATE) 1 GM tablet  tiotropium (SPIRIVA RESPIMAT) 2.5 MCG/ACT inhalation aerosol        Physical Exam     First Vitals:  Patient Vitals for the past 24 hrs:   BP Temp Temp src Pulse Resp SpO2 Weight   07/02/22 2200 120/69 -- -- 90 24 99 % --   07/02/22 2130 122/68 -- -- 83 24 97 % --   07/02/22 2110 126/77 -- -- 94 24 99 % --   07/02/22 2021 -- -- -- -- -- 95 % --   07/02/22 2000 135/80 -- -- 79 23 96 % --   07/02/22 1945 129/77 -- -- 82 24 96 % --   07/02/22 1923 (!) 149/81 98.1  F (36.7  C) Oral 80 22 -- 57.2 kg (126 lb)       PHYSICAL EXAM:   Physical Exam    GENERAL: Awake, alert.  In no acute distress.   HEENT: Normocephalic, atraumatic. Pupils equal, round and reactive. Conjunctiva normal.   NECK: No stridor.  PULMONARY: Symmetrical breath sounds without distress.  She has scattered expiratory wheezes; lungs otherwise clear without rhonchi or rales.  CARDIO: Regular rate and rhythm.  No significant murmur, rub or gallop.  Radial pulses strong and symmetrical.  ABDOMINAL: Abdomen soft, non-distended and non-tender to palpation.    EXTREMITIES: No lower extremity swelling or edema.      NEURO: Alert and oriented to person, place and time.  Cranial nerves grossly intact.  No focal motor deficit.  PSYCH: Normal mood and affect.  SKIN: No rashes.     Results     LAB:  All pertinent labs reviewed and interpreted  Labs Ordered and Resulted from Time of ED Arrival to Time of ED Departure   CBC WITH  PLATELETS - Abnormal       Result Value    WBC Count 8.2      RBC Count 4.82      Hemoglobin 12.5      Hematocrit 39.5      MCV 82      MCH 25.9 (*)     MCHC 31.6      RDW 14.9      Platelet Count 178     BASIC METABOLIC PANEL - Abnormal    Sodium 136      Potassium 3.4 (*)     Chloride 102      Carbon Dioxide (CO2) 24      Anion Gap 10      Urea Nitrogen 7 (*)     Creatinine 0.72      Calcium 9.5      Glucose 254 (*)     GFR Estimate >90     ROUTINE UA WITH MICROSCOPIC REFLEX TO CULTURE - Abnormal    Color Urine Colorless      Appearance Urine Clear      Glucose Urine 300  (*)     Bilirubin Urine Negative      Ketones Urine Negative      Specific Gravity Urine 1.003      Blood Urine Negative      pH Urine 5.0      Protein Albumin Urine Negative      Urobilinogen Urine <2.0      Nitrite Urine Negative      Leukocyte Esterase Urine 25 Shannan/uL (*)     Bacteria Urine Few (*)     RBC Urine 0      WBC Urine <1      Squamous Epithelials Urine <1     GLUCOSE BY METER - Abnormal    GLUCOSE BY METER POCT 266 (*)    TROPONIN I - Normal    Troponin I <0.01     B-TYPE NATRIURETIC PEPTIDE ([facility] ONLY) - Normal    BNP 12     INR - Normal    INR 1.03     PARTIAL THROMBOPLASTIN TIME - Normal    aPTT 28     WET PREPARATION - Normal    Trichomonas Absent      Yeast Absent      Clue Cells Absent      WBCs/high power field None     GLUCOSE MONITOR NURSING POCT       RADIOLOGY:  Chest XR,  PA & LAT   Final Result   IMPRESSION: Calcified granuloma right lower lobe laterally. Lungs are otherwise clear. No adenopathy or effusion. Normal cardiac size and pulmonary vascularity. Coronary artery stents. Atherosclerotic aorta. Degenerative changes both shoulders and the    spine. Mild right convex thoracic curve. Monitoring leads overlying the chest. The patient is rotated.          EC2022, 19:26; NSR with rate of 80 bpm; normal intervals; LAFB; non-specific T wave abnormality (? U wave) with no ST-T wave elevation consistent with ACS  or pericarditis; compared to previous EKG dated 5/4/2022, the rate has decreased by 25 bpm, otherwise no significant changes    EKG independently reviewed and interpreted by Leena Layton MD      I, Gudelia Garza, am serving as a scribe to document services personally performed by Leena Layton MD based on my observation and the provider's statements to me. I, Leena Layton MD attest that Gudelia Garza is acting in a scribe capacity, has observed my performance of the services and has documented them in accordance with my direction.    Leena Layton MD  Emergency Medicine  [facility] EMERGENCY DEPARTMENT         Leena Layton MD  07/03/22 0832

## 2022-07-03 NOTE — DISCHARGE INSTRUCTIONS
Please follow-up with the [facility] this upcoming week; they should call you to arrange appointment.    Please follow-up with your Primary Care Provider this upcoming week regarding your vaginal itching.    Return to the ER for worsening symptoms, worsening chest pain, shortness of breath, if you pass out or feel that you might, nausea / vomiting, fever or other concerns.

## 2022-07-03 NOTE — ED TRIAGE NOTES
"3 days of chills, cp described as tightness, cough, and \"face itching\" with family noticing some red bumps on bilateral cheeks; had prednisone dosed from pulmonary clinic that she is \"almost done with\".     Triage Assessment     Row Name 07/02/22 1925       Triage Assessment (Adult)    Airway WDL WDL       Respiratory WDL    Respiratory WDL X  sob       Skin Circulation/Temperature WDL    Skin Circulation/Temperature WDL WDL       Cardiac WDL    Cardiac WDL X  chest tightness       Peripheral/Neurovascular WDL    Peripheral Neurovascular WDL WDL       Cognitive/Neuro/Behavioral WDL    Cognitive/Neuro/Behavioral WDL WDL              "

## 2022-07-05 DIAGNOSIS — I10 ESSENTIAL HYPERTENSION: ICD-10-CM

## 2022-07-05 RX ORDER — DEXTROMETHORPHAN POLISTIREX 30 MG/5ML
60 SUSPENSION ORAL 2 TIMES DAILY PRN
Qty: 178 ML | Refills: 0 | Status: SHIPPED | OUTPATIENT
Start: 2022-07-05 | End: 2022-10-03

## 2022-07-06 RX ORDER — HYDROCHLOROTHIAZIDE 12.5 MG/1
CAPSULE ORAL
Qty: 90 CAPSULE | Refills: 3 | Status: SHIPPED | OUTPATIENT
Start: 2022-07-06 | End: 2023-06-28

## 2022-07-06 NOTE — TELEPHONE ENCOUNTER
"Routing refill request to provider for review/approval because:  Labs not current:  Cr    Last Written Prescription Date:  10/13/21  Last Fill Quantity: 90,  # refills: 2   Last office visit provider:  5/12/22    Requested Prescriptions   Pending Prescriptions Disp Refills     hydrochlorothiazide (MICROZIDE) 12.5 MG capsule [Pharmacy Med Name: HYDROCHLOROTHIAZIDE 12.5 MG 12.5 Capsule] 90 capsule 3     Sig: TAKE 1 CAPSULE (12.5 MG TOTAL) BY MOUTH DAILY FOR BLOOD PRESSURE       Diuretics (Including Combos) Protocol Failed - 7/5/2022  9:26 AM        Failed - Normal serum potassium on file in past 12 months     Recent Labs   Lab Test 07/02/22 1929   POTASSIUM 3.4*                    Passed - Blood pressure under 140/90 in past 12 months     BP Readings from Last 3 Encounters:   07/02/22 120/69   05/12/22 116/78   05/08/22 126/74                 Passed - Recent (12 mo) or future (30 days) visit within the authorizing provider's specialty     Patient has had an office visit with the authorizing provider or a provider within the authorizing providers department within the previous 12 mos or has a future within next 30 days. See \"Patient Info\" tab in inbasket, or \"Choose Columns\" in Meds & Orders section of the refill encounter.              Passed - Medication is active on med list        Passed - Patient is age 18 or older        Passed - No active pregancy on record        Passed - Normal serum creatinine on file in past 12 months     Recent Labs   Lab Test 07/02/22 1929   CR 0.72              Passed - Normal serum sodium on file in past 12 months     Recent Labs   Lab Test 07/02/22 1929                 Passed - No positive pregnancy test in past 12 months             Kailee Patton 07/05/22 7:24 PM  "

## 2022-07-08 ENCOUNTER — DOCUMENTATION ONLY (OUTPATIENT)
Dept: SLEEP MEDICINE | Facility: CLINIC | Age: 52
End: 2022-07-08

## 2022-07-08 DIAGNOSIS — G47.33 OSA (OBSTRUCTIVE SLEEP APNEA): Primary | ICD-10-CM

## 2022-07-08 NOTE — PROGRESS NOTES
30 DAY STM VISIT    Diagnostic AHI: 14.2 PSG    Subjective measures:   Spoke with daughter in-law she stateed everything going well with her  Mother in-laws CPAP.  Explained that we have no data since 5/30/2022    Assessment: Pt not meeting objective benchmarks for compliance  Patient meeting subjective benchmarks.   Action plan: pt to have 6 month STM visit  Patient has not scheduled a follow up visit.   Device type: Auto-CPAP  PAP settings: CPAP min 6.0 cm  H20     CPAP max 15.0 cm  H20          RESMED EPR level Setting: TWO    RESMED Soft response setting:  OFF  Mask type:  Nasal Mask  Objective measures: 14 day rolling measures           Objective measure goal  Compliance   Goal >70%  Leak   Goal < 24 lpm  AHI  Goal < 5  Usage  Goal >240        Total time spent on accessing and interpreting remote patient PAP therapy data  10 minutes    Total time spent counseling, coaching  and reviewing PAP therapy data with patient  2 minutes     [id number] this call  [id number] no  at 3 or 14 day [facility]

## 2022-07-11 ENCOUNTER — APPOINTMENT (OUTPATIENT)
Dept: CT IMAGING | Facility: HOSPITAL | Age: 52
End: 2022-07-11
Attending: FAMILY MEDICINE
Payer: COMMERCIAL

## 2022-07-11 ENCOUNTER — HOSPITAL ENCOUNTER (EMERGENCY)
Facility: HOSPITAL | Age: 52
Discharge: HOME | End: 2022-07-12
Attending: FAMILY MEDICINE | Admitting: FAMILY MEDICINE
Payer: COMMERCIAL

## 2022-07-11 DIAGNOSIS — R07.9 CHEST PAIN, UNSPECIFIED TYPE: ICD-10-CM

## 2022-07-11 LAB
ANION GAP SERPL CALCULATED.3IONS-SCNC: 11 MMOL/L (ref 5–18)
BASOPHILS # BLD AUTO: 0.1 10E3/UL (ref 0–0.2)
BASOPHILS NFR BLD AUTO: 1 %
BNP SERPL-MCNC: 15 PG/ML (ref 0–80)
BUN SERPL-MCNC: 6 MG/DL (ref 8–22)
CALCIUM SERPL-MCNC: 9.3 MG/DL (ref 8.5–10.5)
CHLORIDE BLD-SCNC: 100 MMOL/L (ref 98–107)
CO2 SERPL-SCNC: 24 MMOL/L (ref 22–31)
CREAT SERPL-MCNC: 0.73 MG/DL (ref 0.6–1.1)
EOSINOPHIL # BLD AUTO: 1.1 10E3/UL (ref 0–0.7)
EOSINOPHIL NFR BLD AUTO: 14 %
ERYTHROCYTE [DISTWIDTH] IN BLOOD BY AUTOMATED COUNT: 15.1 % (ref 10–15)
GFR SERPL CREATININE-BSD FRML MDRD: >90 ML/MIN/1.73M2
GLUCOSE BLD-MCNC: 281 MG/DL (ref 70–125)
HCT VFR BLD AUTO: 38.2 % (ref 35–47)
HGB BLD-MCNC: 12 G/DL (ref 11.7–15.7)
IMM GRANULOCYTES # BLD: 0 10E3/UL
IMM GRANULOCYTES NFR BLD: 0 %
LYMPHOCYTES # BLD AUTO: 1.9 10E3/UL (ref 0.8–5.3)
LYMPHOCYTES NFR BLD AUTO: 24 %
MCH RBC QN AUTO: 26.1 PG (ref 26.5–33)
MCHC RBC AUTO-ENTMCNC: 31.4 G/DL (ref 31.5–36.5)
MCV RBC AUTO: 83 FL (ref 78–100)
MONOCYTES # BLD AUTO: 0.6 10E3/UL (ref 0–1.3)
MONOCYTES NFR BLD AUTO: 7 %
NEUTROPHILS # BLD AUTO: 4.3 10E3/UL (ref 1.6–8.3)
NEUTROPHILS NFR BLD AUTO: 54 %
NRBC # BLD AUTO: 0 10E3/UL
NRBC BLD AUTO-RTO: 0 /100
PLATELET # BLD AUTO: 172 10E3/UL (ref 150–450)
POTASSIUM BLD-SCNC: 3.9 MMOL/L (ref 3.5–5)
RBC # BLD AUTO: 4.6 10E6/UL (ref 3.8–5.2)
SODIUM SERPL-SCNC: 135 MMOL/L (ref 136–145)
TROPONIN I SERPL-MCNC: <0.01 NG/ML (ref 0–0.29)
WBC # BLD AUTO: 8 10E3/UL (ref 4–11)

## 2022-07-11 PROCEDURE — 36415 COLL VENOUS BLD VENIPUNCTURE: CPT | Performed by: STUDENT IN AN ORGANIZED HEALTH CARE EDUCATION/TRAINING PROGRAM

## 2022-07-11 PROCEDURE — 84484 ASSAY OF TROPONIN QUANT: CPT | Performed by: FAMILY MEDICINE

## 2022-07-11 PROCEDURE — 250N000011 HC RX IP 250 OP 636: Performed by: FAMILY MEDICINE

## 2022-07-11 PROCEDURE — 250N000009 HC RX 250: Performed by: FAMILY MEDICINE

## 2022-07-11 PROCEDURE — 82310 ASSAY OF CALCIUM: CPT | Performed by: STUDENT IN AN ORGANIZED HEALTH CARE EDUCATION/TRAINING PROGRAM

## 2022-07-11 PROCEDURE — 80048 BASIC METABOLIC PNL TOTAL CA: CPT | Performed by: FAMILY MEDICINE

## 2022-07-11 PROCEDURE — 250N000013 HC RX MED GY IP 250 OP 250 PS 637: Performed by: FAMILY MEDICINE

## 2022-07-11 PROCEDURE — 96374 THER/PROPH/DIAG INJ IV PUSH: CPT

## 2022-07-11 PROCEDURE — 71250 CT THORAX DX C-: CPT

## 2022-07-11 PROCEDURE — 36415 COLL VENOUS BLD VENIPUNCTURE: CPT | Performed by: FAMILY MEDICINE

## 2022-07-11 PROCEDURE — 93005 ELECTROCARDIOGRAM TRACING: CPT | Performed by: STUDENT IN AN ORGANIZED HEALTH CARE EDUCATION/TRAINING PROGRAM

## 2022-07-11 PROCEDURE — 84484 ASSAY OF TROPONIN QUANT: CPT | Performed by: STUDENT IN AN ORGANIZED HEALTH CARE EDUCATION/TRAINING PROGRAM

## 2022-07-11 PROCEDURE — 85025 COMPLETE CBC W/AUTO DIFF WBC: CPT | Performed by: STUDENT IN AN ORGANIZED HEALTH CARE EDUCATION/TRAINING PROGRAM

## 2022-07-11 PROCEDURE — 99285 EMERGENCY DEPT VISIT HI MDM: CPT | Mod: 25

## 2022-07-11 PROCEDURE — 83880 ASSAY OF NATRIURETIC PEPTIDE: CPT | Performed by: FAMILY MEDICINE

## 2022-07-11 PROCEDURE — 85025 COMPLETE CBC W/AUTO DIFF WBC: CPT | Performed by: FAMILY MEDICINE

## 2022-07-11 PROCEDURE — 93005 ELECTROCARDIOGRAM TRACING: CPT | Performed by: FAMILY MEDICINE

## 2022-07-11 RX ADMIN — FAMOTIDINE 20 MG: 10 INJECTION, SOLUTION INTRAVENOUS at 22:13

## 2022-07-11 RX ADMIN — LIDOCAINE HYDROCHLORIDE 30 ML: 20 SOLUTION ORAL; TOPICAL at 22:16

## 2022-07-11 ASSESSMENT — ENCOUNTER SYMPTOMS
SHORTNESS OF BREATH: 1
ABDOMINAL PAIN: 0
NAUSEA: 1
VOMITING: 1

## 2022-07-12 ENCOUNTER — TELEPHONE (OUTPATIENT)
Dept: SLEEP MEDICINE | Facility: CLINIC | Age: 52
End: 2022-07-12

## 2022-07-12 VITALS
TEMPERATURE: 98.3 F | OXYGEN SATURATION: 96 % | BODY MASS INDEX: 21.63 KG/M2 | RESPIRATION RATE: 17 BRPM | HEART RATE: 82 BPM | SYSTOLIC BLOOD PRESSURE: 132 MMHG | DIASTOLIC BLOOD PRESSURE: 80 MMHG | HEIGHT: 64 IN

## 2022-07-12 LAB
ATRIAL RATE - MUSE: 80 BPM
DIASTOLIC BLOOD PRESSURE - MUSE: NORMAL MMHG
INTERPRETATION ECG - MUSE: NORMAL
P AXIS - MUSE: 31 DEGREES
PR INTERVAL - MUSE: 160 MS
QRS DURATION - MUSE: 90 MS
QT - MUSE: 386 MS
QTC - MUSE: 445 MS
R AXIS - MUSE: -24 DEGREES
SYSTOLIC BLOOD PRESSURE - MUSE: NORMAL MMHG
T AXIS - MUSE: 50 DEGREES
TROPONIN I SERPL-MCNC: <0.01 NG/ML (ref 0–0.29)
VENTRICULAR RATE- MUSE: 80 BPM

## 2022-07-12 RX ORDER — SUCRALFATE 1 G/1
1 TABLET ORAL 4 TIMES DAILY
Qty: 28 TABLET | Refills: 0 | Status: SHIPPED | OUTPATIENT
Start: 2022-07-12 | End: 2022-07-19

## 2022-07-12 RX ORDER — ONDANSETRON 4 MG/1
4 TABLET, ORALLY DISINTEGRATING ORAL EVERY 6 HOURS PRN
Qty: 20 TABLET | Refills: 0 | Status: SHIPPED | OUTPATIENT
Start: 2022-07-12 | End: 2022-07-15

## 2022-07-12 NOTE — ED TRIAGE NOTES
Patient presents to ED with mid sternal intermittent chest pain that began yesterday.  Has history of 2 cardiac stents placed.     Triage Assessment     Row Name 07/11/22 2005       Triage Assessment (Adult)    Airway WDL WDL       Respiratory WDL    Respiratory WDL WDL       Skin Circulation/Temperature WDL    Skin Circulation/Temperature WDL WDL       Cardiac WDL    Cardiac WDL WDL       Peripheral/Neurovascular WDL    Peripheral Neurovascular WDL WDL       Cognitive/Neuro/Behavioral WDL    Cognitive/Neuro/Behavioral WDL WDL

## 2022-07-12 NOTE — TELEPHONE ENCOUNTER
CALLED AND LVM LETTING PT KNOW THAT WE WOULD LIKE THEM TO UNPLUG AND REPLUG IN THEIR DEVICE BECAUSE TI APPEARS ITS NOT TRANSMITTING CORRECTLY. IF PT HAS ANY QUESTIONS THEY CAN CALL US BACK AT OUR MAIN NUMBER.

## 2022-07-12 NOTE — ED PROVIDER NOTES
EMERGENCY DEPARTMENT ENCOUNTER      NAME: Kulwant Amin  AGE: 52 year old female  YOB: 1970  MRN: [id number]  EVALUATION DATE & TIME: 7/11/2022  9:40 PM    PCP: Adrien Cardoza    ED PROVIDER: Adeel Brown M.D.    Chief Complaint   Patient presents with     Chest Pain       FINAL IMPRESSION:  1. Chest pain, unspecified type        ED COURSE & MEDICAL DECISION MAKING:    Pertinent Labs & Imaging studies personally reviewed and interpreted by me. (See chart for details)  9:55 PM Patient seen and examined, prior records reviewed.  Differential diagnosis includes but not limited to chest wall pain, esophagitis, myocardial infarction, pneumonia, rib fracture, pulmonary embolism, pneumothorax, aortic dissection, aortic aneurysm.  Patient presents with burning substernal chest pain.  Was seen about 10 days ago with similar, negative work-up and discharged.  Patient had an angiogram 2 months ago that showed mild stenosis but generally no disease.  EKG is reassuring.  Initial troponin done prior to evaluation was negative.  Repeat troponin is ordered.  Patient points from the epigastrium to the sternal notch when describing her pain and says it is burning, consider GI source.  GI cocktail was ordered.  12:42 AM repeat troponin is negative.  Chest CT is negative for any acute findings.  Remaining labs are reassuring and patient is stable for discharge.  Patient feels better after GI cocktail.    At the conclusion of the encounter I discussed the results of all of the tests and the disposition. The questions were answered. The patient or family acknowledged understanding and was agreeable with the care plan.     PROCEDURES:   Procedures    MEDICATIONS GIVEN IN THE EMERGENCY:  Medications   lidocaine (viscous) (XYLOCAINE) 2 % 15 mL, alum & mag hydroxide-simethicone (MAALOX) 15 mL GI Cocktail (30 mLs Oral Given 7/11/22 2216)   famotidine (PEPCID) injection 20 mg (20 mg Intravenous Given 7/11/22 2213)       NEW  PRESCRIPTIONS STARTED AT TODAY'S ER VISIT  New Prescriptions    No medications on file       =================================================================    HPI    Patient information was obtained from: Patient    Patient declines professional Croatian . History was obtained with assistance from the patient's daughter.       Kulwant Amin is a 52 year old female with a pertinent history of DM II, TB, COPD, GERD, CAD, HTN, HLD, arterial ischemic stroke, s/p coronary artery stent placement, s/p coronary angiogram (05/22/2022) who presents to this ED by wheelchair for evaluation of chest pain. Patient presents with intermittent mid sternal chest pain since yesterday. Pain is described to be burning and 10 minutes in duration. Denies any provoking or palliating factors, noting that it comes on with sitting, laying, walking, eating, or swallowing food. She endorses associated nausea, vomiting, and shortness with breath secondary to pain. Patient has Asprin at home. Denies having nitroglycerin. Patient reports recently being seen last week for a similar problem. Patient notes chronic cough due to history of asthma. Patient denies abdominal pain, or any additional complaints at this time.     REVIEW OF SYSTEMS   Review of Systems   Respiratory: Positive for shortness of breath.    Cardiovascular: Positive for chest pain.   Gastrointestinal: Positive for nausea and vomiting. Negative for abdominal pain.   All other systems reviewed and are negative.     All other systems reviewed and negative    PAST MEDICAL HISTORY:  Past Medical History:   Diagnosis Date     Acute asthma exacerbation 01/06/2020     Anxiety      Arthritis      Asthma exacerbation 11/19/2015     Chronic obstructive pulmonary disease, unspecified COPD type (H)      COPD (chronic obstructive pulmonary disease) (H)      Coronary artery disease due to lipid rich plaque      Depression      Epigastric pain 12/15/2021     Essential hypertension       GERD (gastroesophageal reflux disease)      Infection due to 2019 novel coronavirus 01/03/2022    positive with COVID-19 on January 3, 2022     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     Microcytic anemia 11/17/2019     S/P coronary artery stent placement 02/02/2018     TB lung, latent     9 mos INH       PAST SURGICAL HISTORY:  Past Surgical History:   Procedure Laterality Date     CORONARY STENT PLACEMENT  2018     CV CORONARY ANGIOGRAM N/A 1/25/2018    Procedure: Coronary Angiogram;  Surgeon: Angelo Serrano MD;  Location: [facility] Cath Lab;  Service:      CV CORONARY ANGIOGRAM N/A 5/5/2022    Procedure: Coronary Angiogram;  Surgeon: Irwin Cano MD;  Location: [facility] CATH LAB CV     CV CORONARY ANGIOGRAM  5/5/2022    Procedure: ;  Surgeon: Irwin Cano MD;  Location: [facility] LAB CV     AK ESOPHAGOGASTRODUODENOSCOPY TRANSORAL DIAGNOSTIC N/A 12/10/2018    Procedure: ESOPHAGOGASTRODUODENOSCOPY (EGD);  Surgeon: Eddie Renteria MD;  Location: [facility];  Service: Gastroenterology     AK ESOPHAGOGASTRODUODENOSCOPY TRANSORAL DIAGNOSTIC N/A 12/3/2020    Procedure: ESOPHAGOGASTRODUODENOSCOPY (EGD) with biospies ;  Surgeon: Avi Crow MD;  Location: [facility];  Service: Gastroenterology     [facility] COLONOSCOPY W/WO BRUSH/WASH N/A 12/10/2018    Procedure: COLONOSCOPY with polypectomy using biopsy forceps;  Surgeon: Eddie Renteria MD;  Location: [facility];  Service: Gastroenterology       CURRENT MEDICATIONS:    No current facility-administered medications for this encounter.     Current Outpatient Medications   Medication     acetaminophen (TYLENOL) 500 MG tablet     albuterol (PROAIR HFA/PROVENTIL HFA/VENTOLIN HFA) 108 (90 Base) MCG/ACT inhaler     albuterol (PROVENTIL) (2.5 MG/3ML) 0.083% neb solution     ALLERGY RELIEF 10 MG tablet     amitriptyline (ELAVIL) 10 MG tablet     ASPIRIN LOW DOSE 81 MG EC tablet     atorvastatin (LIPITOR) 80 MG tablet     B-D U/F 31G X 8 MM insulin pen  needle     Continuous Blood Gluc  (FREESTYLE MONICA 14 DAY READER) MICHAEL     Continuous Blood Gluc Sensor (FREESTYLE MONICA 14 DAY SENSOR) MISC     CONTOUR NEXT TEST test strip     cyclobenzaprine (FLEXERIL) 5 MG tablet     dextromethorphan (DELSYM) 30 MG/5ML liquid     Dupilumab (DUPIXENT) 300 MG/2ML SOPN     famotidine (PEPCID) 20 MG tablet     fluticasone-vilanterol (BREO ELLIPTA) 200-25 MCG/INH inhaler     gabapentin (NEURONTIN) 300 MG capsule     Global Inject Ease Lancets 30G MISC     guaiFENesin (ROBITUSSIN) 100 MG/5ML liquid     hydrochlorothiazide (MICROZIDE) 12.5 MG capsule     hydrocortisone (CORTAID) 1 % external cream     insulin aspart (NOVOLOG PEN) 100 UNIT/ML pen     Insulin Aspart FlexPen 100 UNIT/ML SOPN     insulin glargine (LANTUS PEN) 100 UNIT/ML pen     insulin pen needle (32G X 4 MM) 32G X 4 MM miscellaneous     ipratropium - albuterol 0.5 mg/2.5 mg/3 mL (DUONEB) 0.5-2.5 (3) MG/3ML neb solution     lidocaine (XYLOCAINE) 5 % external ointment     meclizine (ANTIVERT) 25 MG tablet     metFORMIN (GLUCOPHAGE) 1000 MG tablet     metoprolol tartrate (LOPRESSOR) 25 MG tablet     montelukast (SINGULAIR) 10 MG tablet     omeprazole (PRILOSEC) 40 MG DR capsule     Polyethylene Glycol 3350 (PEG 3350) 17 GM/SCOOP POWD     polyvinyl alcohol (ARTIFICIAL TEARS) 1.4 % ophthalmic solution     predniSONE (DELTASONE) 20 MG tablet     sucralfate (CARAFATE) 1 GM tablet     tiotropium (SPIRIVA RESPIMAT) 2.5 MCG/ACT inhalation aerosol       ALLERGIES:  No Known Allergies    FAMILY HISTORY:  Family History   Problem Relation Age of Onset     Other - See Comments Mother          of an intestinal problem     Ulcers Father          of gastritis     Breast Cancer No family hx of        SOCIAL HISTORY:   Social History     Socioeconomic History     Marital status:    Tobacco Use     Smoking status: Former Smoker     Packs/day: 1.00     Years: 30.00     Pack years: 30.00     Types: Cigarettes,  "Cigarettes, Cigarettes     Quit date: 2003     Years since quittin.5     Smokeless tobacco: Never Used     Tobacco comment: No passive exposure   Substance and Sexual Activity     Alcohol use: No     Drug use: No     Sexual activity: Yes     Partners: Male   Social History Narrative    2017 The patient lives with her daughter-in-law (who is present), , son, and 2 grandchildren (total of 6 people). [place].       VITALS:  /80   Pulse 72   Temp 98.3  F (36.8  C) (Oral)   Resp 21   Ht 1.626 m (5' 4\")   SpO2 99%   BMI 21.63 kg/m      PHYSICAL EXAM:  Physical Exam  Vitals and nursing note reviewed.   Constitutional:       Appearance: Normal appearance.   HENT:      Head: Normocephalic and atraumatic.      Right Ear: External ear normal.      Left Ear: External ear normal.      Nose: Nose normal.      Mouth/Throat:      Mouth: Mucous membranes are moist.   Eyes:      Extraocular Movements: Extraocular movements intact.      Conjunctiva/sclera: Conjunctivae normal.      Pupils: Pupils are equal, round, and reactive to light.   Cardiovascular:      Rate and Rhythm: Normal rate and regular rhythm.   Pulmonary:      Effort: Pulmonary effort is normal.      Breath sounds: Normal breath sounds. No wheezing or rales.   Abdominal:      General: Abdomen is flat. There is no distension.      Palpations: Abdomen is soft.      Tenderness: There is no abdominal tenderness. There is no guarding.   Musculoskeletal:         General: Normal range of motion.      Cervical back: Normal range of motion and neck supple.      Right lower leg: No edema.      Left lower leg: No edema.   Lymphadenopathy:      Cervical: No cervical adenopathy.   Skin:     General: Skin is warm and dry.   Neurological:      General: No focal deficit present.      Mental Status: She is alert and oriented to person, place, and time. Mental status is at baseline.      Comments: No gross focal neurologic deficits   Psychiatric:       "   Mood and Affect: Mood normal.         Behavior: Behavior normal.         Thought Content: Thought content normal.          LAB:  All pertinent labs reviewed and interpreted.  Results for orders placed or performed during the hospital encounter of 07/11/22   Chest CT w/o contrast    Impression    IMPRESSION:   1.  No acute abnormality. No cause of chest pain is evident.  2.  A few small lung nodules without significant change.    Recommendations for one or multiple incidental lung nodules < 6mm :    Low risk patients: No routine follow-up.    High risk patients: Optional follow-up CT at 12 months; if unchanged, no further follow-up.    *Low Risk: Minimal or absent history of smoking or other known risk factors.  *Nonsolid (ground glass) or partly solid nodules may require longer follow-up to exclude indolent adenocarcinoma.  *Recommendations based on Guidelines for the Management of Incidental Pulmonary Nodules Detected at CT: From the Fleischner Society 2017, Radiology 2017.       Basic metabolic panel   Result Value Ref Range    Sodium 135 (L) 136 - 145 mmol/L    Potassium 3.9 3.5 - 5.0 mmol/L    Chloride 100 98 - 107 mmol/L    Carbon Dioxide (CO2) 24 22 - 31 mmol/L    Anion Gap 11 5 - 18 mmol/L    Urea Nitrogen 6 (L) 8 - 22 mg/dL    Creatinine 0.73 0.60 - 1.10 mg/dL    Calcium 9.3 8.5 - 10.5 mg/dL    Glucose 281 (H) 70 - 125 mg/dL    GFR Estimate >90 >60 mL/min/1.73m2   Result Value Ref Range    Troponin I <0.01 0.00 - 0.29 ng/mL   CBC with platelets and differential   Result Value Ref Range    WBC Count 8.0 4.0 - 11.0 10e3/uL    RBC Count 4.60 3.80 - 5.20 10e6/uL    Hemoglobin 12.0 11.7 - 15.7 g/dL    Hematocrit 38.2 35.0 - 47.0 %    MCV 83 78 - 100 fL    MCH 26.1 (L) 26.5 - 33.0 pg    MCHC 31.4 (L) 31.5 - 36.5 g/dL    RDW 15.1 (H) 10.0 - 15.0 %    Platelet Count 172 150 - 450 10e3/uL    % Neutrophils 54 %    % Lymphocytes 24 %    % Monocytes 7 %    % Eosinophils 14 %    % Basophils 1 %    % Immature  Granulocytes 0 %    NRBCs per 100 WBC 0 <1 /100    Absolute Neutrophils 4.3 1.6 - 8.3 10e3/uL    Absolute Lymphocytes 1.9 0.8 - 5.3 10e3/uL    Absolute Monocytes 0.6 0.0 - 1.3 10e3/uL    Absolute Eosinophils 1.1 (H) 0.0 - 0.7 10e3/uL    Absolute Basophils 0.1 0.0 - 0.2 10e3/uL    Absolute Immature Granulocytes 0.0 <=0.4 10e3/uL    Absolute NRBCs 0.0 10e3/uL   B-Type Natriuretic Peptide ([facility] Only)   Result Value Ref Range    BNP 15 0 - 80 pg/mL   Result Value Ref Range    Troponin I <0.01 0.00 - 0.29 ng/mL       RADIOLOGY:  Reviewed all pertinent imaging. Please see official radiology report.  Chest CT w/o contrast   Final Result   IMPRESSION:    1.  No acute abnormality. No cause of chest pain is evident.   2.  A few small lung nodules without significant change.      Recommendations for one or multiple incidental lung nodules < 6mm :     Low risk patients: No routine follow-up.     High risk patients: Optional follow-up CT at 12 months; if unchanged, no further follow-up.      *Low Risk: Minimal or absent history of smoking or other known risk factors.   *Nonsolid (ground glass) or partly solid nodules may require longer follow-up to exclude indolent adenocarcinoma.   *Recommendations based on Guidelines for the Management of Incidental Pulmonary Nodules Detected at CT: From the Fleischner Society 2017, Radiology 2017.                EKG:    Performed at: 8:13 PM  Impression: No acute ischemic findings, right ventricular conduction delay  Rate: 80  Rhythm: Sinus  Axis: Normal  MO Interval: 160  QRS Interval: 90  QTc Interval: 445  ST Changes: No acute ischemic changes  Comparison: July 2, 2022, no acute changes are found    I have independently reviewed and interpreted the EKG(s) documented above.    I, Jorge Soares, am serving as a scribe to document services personally performed by Dr. Brown based on my observation and the provider's statements to me. I, Adeel Brown MD attest that Jorge Soares is  acting in a scribe capacity, has observed my performance of the services and has documented them in accordance with my direction.    Adeel Brown M.D.  Emergency Medicine  [facility] EMERGENCY DEPARTMENT  [address] [place] [id number]  [phone]  Dept: [phone]     Adeel Brown MD  07/12/22 0053

## 2022-07-14 ENCOUNTER — OFFICE VISIT (OUTPATIENT)
Dept: FAMILY MEDICINE | Facility: CLINIC | Age: 52
End: 2022-07-14
Payer: COMMERCIAL

## 2022-07-14 ENCOUNTER — OFFICE VISIT (OUTPATIENT)
Dept: PULMONOLOGY | Facility: OTHER | Age: 52
End: 2022-07-14

## 2022-07-14 VITALS
OXYGEN SATURATION: 98 % | BODY MASS INDEX: 23.26 KG/M2 | WEIGHT: 121 LBS | SYSTOLIC BLOOD PRESSURE: 112 MMHG | DIASTOLIC BLOOD PRESSURE: 60 MMHG | HEART RATE: 84 BPM

## 2022-07-14 VITALS
WEIGHT: 120.06 LBS | HEIGHT: 60 IN | TEMPERATURE: 98.2 F | OXYGEN SATURATION: 98 % | DIASTOLIC BLOOD PRESSURE: 70 MMHG | SYSTOLIC BLOOD PRESSURE: 120 MMHG | RESPIRATION RATE: 24 BRPM | HEART RATE: 111 BPM | BODY MASS INDEX: 23.57 KG/M2

## 2022-07-14 DIAGNOSIS — R06.02 SOB (SHORTNESS OF BREATH): ICD-10-CM

## 2022-07-14 DIAGNOSIS — R00.2 PALPITATIONS: Primary | ICD-10-CM

## 2022-07-14 DIAGNOSIS — F41.9 ANXIETY: ICD-10-CM

## 2022-07-14 DIAGNOSIS — I25.10 CORONARY ARTERY DISEASE DUE TO LIPID RICH PLAQUE: Chronic | ICD-10-CM

## 2022-07-14 DIAGNOSIS — Z79.4 TYPE 2 DIABETES MELLITUS TREATED WITH INSULIN (H): ICD-10-CM

## 2022-07-14 DIAGNOSIS — I25.83 CORONARY ARTERY DISEASE DUE TO LIPID RICH PLAQUE: Chronic | ICD-10-CM

## 2022-07-14 DIAGNOSIS — N95.1 VAGINAL DRYNESS, MENOPAUSAL: ICD-10-CM

## 2022-07-14 DIAGNOSIS — R10.9 CHRONIC ABDOMINAL PAIN: ICD-10-CM

## 2022-07-14 DIAGNOSIS — E11.9 TYPE 2 DIABETES MELLITUS TREATED WITH INSULIN (H): ICD-10-CM

## 2022-07-14 DIAGNOSIS — R05.9 COUGH: ICD-10-CM

## 2022-07-14 DIAGNOSIS — G89.29 CHRONIC ABDOMINAL PAIN: ICD-10-CM

## 2022-07-14 DIAGNOSIS — J45.50 SEVERE PERSISTENT ASTHMA WITHOUT COMPLICATION (H): Primary | ICD-10-CM

## 2022-07-14 LAB — TSH SERPL DL<=0.005 MIU/L-ACNC: 0.59 UIU/ML (ref 0.3–4.2)

## 2022-07-14 PROCEDURE — 84443 ASSAY THYROID STIM HORMONE: CPT | Performed by: FAMILY MEDICINE

## 2022-07-14 PROCEDURE — 36415 COLL VENOUS BLD VENIPUNCTURE: CPT | Performed by: FAMILY MEDICINE

## 2022-07-14 PROCEDURE — 99214 OFFICE O/P EST MOD 30 MIN: CPT | Performed by: INTERNAL MEDICINE

## 2022-07-14 PROCEDURE — 99214 OFFICE O/P EST MOD 30 MIN: CPT | Performed by: FAMILY MEDICINE

## 2022-07-14 RX ORDER — FAMOTIDINE 20 MG/1
20 TABLET, FILM COATED ORAL 2 TIMES DAILY
Qty: 90 TABLET | Refills: 3 | Status: SHIPPED | OUTPATIENT
Start: 2022-07-14 | End: 2023-01-03

## 2022-07-14 RX ORDER — BENZOCAINE/MENTHOL 6 MG-10 MG
LOZENGE MUCOUS MEMBRANE 2 TIMES DAILY
Qty: 60 G | Refills: 0 | Status: SHIPPED | OUTPATIENT
Start: 2022-07-14 | End: 2024-01-22

## 2022-07-14 RX ORDER — LORAZEPAM 0.5 MG/1
0.5 TABLET ORAL 2 TIMES DAILY PRN
Qty: 30 TABLET | Refills: 0 | Status: SHIPPED | OUTPATIENT
Start: 2022-07-14 | End: 2022-10-25

## 2022-07-14 ASSESSMENT — ASTHMA QUESTIONNAIRES
EMERGENCY_ROOM_LAST_YEAR_TOTAL: TWO
QUESTION_2 LAST FOUR WEEKS HOW OFTEN HAVE YOU HAD SHORTNESS OF BREATH: ONCE A DAY
QUESTION_4 LAST FOUR WEEKS HOW OFTEN HAVE YOU USED YOUR RESCUE INHALER OR NEBULIZER MEDICATION (SUCH AS ALBUTEROL): THREE OR MORE TIMES PER DAY
ACT_TOTALSCORE: 7
ACT_TOTALSCORE: 7
QUESTION_5 LAST FOUR WEEKS HOW WOULD YOU RATE YOUR ASTHMA CONTROL: POORLY CONTROLLED
QUESTION_1 LAST FOUR WEEKS HOW MUCH OF THE TIME DID YOUR ASTHMA KEEP YOU FROM GETTING AS MUCH DONE AT WORK, SCHOOL OR AT HOME: ALL OF THE TIME
QUESTION_3 LAST FOUR WEEKS HOW OFTEN DID YOUR ASTHMA SYMPTOMS (WHEEZING, COUGHING, SHORTNESS OF BREATH, CHEST TIGHTNESS OR PAIN) WAKE YOU UP AT NIGHT OR EARLIER THAN USUAL IN THE MORNING: FOUR OR MORE NIGHTS A WEEK
HOSPITALIZATION_OVERNIGHT_LAST_YEAR_TOTAL: TWO

## 2022-07-14 ASSESSMENT — PATIENT HEALTH QUESTIONNAIRE - PHQ9
SUM OF ALL RESPONSES TO PHQ QUESTIONS 1-9: 7
SUM OF ALL RESPONSES TO PHQ QUESTIONS 1-9: 7
10. IF YOU CHECKED OFF ANY PROBLEMS, HOW DIFFICULT HAVE THESE PROBLEMS MADE IT FOR YOU TO DO YOUR WORK, TAKE CARE OF THINGS AT HOME, OR GET ALONG WITH OTHER PEOPLE: VERY DIFFICULT

## 2022-07-14 NOTE — PROGRESS NOTES
Assessment/Plan:    50 y.o.-year-old woman with history of organic fuel exposure in the home was previously had nondiagnostic PFTs.  She was previously evaluated by Dr. Marie and initiated on Dupixent for her hypereosinophilia with some response to this. Her hypereosinophilia evaluation has been unremarkable.  For the present we would recommend;    Continue Breo Ellipta 1 puff daily.  She knows to gargle after using this.    Continue Spiriva.    Continue to use 3 times daily albuterol nebs.    Continue Protonix 40 mg daily.    Continue Dupixent injections (increased dose).    As needed albuterol for rescue.    Has received both doses of her Covid-19 vaccine.    We will send a note to Dr. Cardoza as I think that the patient may have delayed gastric motility based on her symptoms presently.  She is on gabapentin already and I am not sure what other interventions we could offer her, however, it may be worthwhile for her to be seen by endocrinology.    I advised her daughter know to initiate Tums on her in addition to her present regimen of medications to see if this alleviates her symptoms.  She has recently undergone LFTs and abdominal imaging which has not demonstrated any significant abnormalities to explain her new constellation of symptoms.    Return to clinic in 6 weeks.    Tamra Duong  Pulmonary and Critical Care  [id number]          Subjective:    Patient ID: Ms. Amin is a 49 y.o.female with a past medical history significant for anxiety, arthritis, questionable asthma versus COPD, diabetes, hypertension and a prior history of SVT presents with a follow-up visit for her pulmonary complaints.  She follows with me fairly closely for her moderate-persistent, difficult to control asthma symptoms.   Her daughter-in-law who is here with her today states that the patient has been experiencing palpitations, generalized sweats and increased shortness of breath for the last several weeks.  She was apparently very wheezy last  week and is presently complaining of epigastric and chest pain which radiates to her back.  She endorses symptoms of acid reflux and has been regular about her antireflux therapies.  Of note.  The patient has been feeling very anxious as she thinks that she is about to die and was initiated on Ativan just this morning to see if this would alleviate her symptoms.      ACT Total Scores 3/17/2022 4/14/2022 7/14/2022   ACT TOTAL SCORE - - -   ASTHMA ER VISITS - - -   ASTHMA HOSPITALIZATIONS - - -   ACT TOTAL SCORE (Goal Greater than or Equal to 20) 11 12 7   In the past 12 months, how many times did you visit the emergency room for your asthma without being admitted to the hospital? 0 0 2   In the past 12 months, how many times were you hospitalized overnight because of your asthma? 0 0 2         Pertinent past medical history:  48-year-old female here for follow-up.  Her breathing is a bit worse than 6 months ago.  She had multiple ER visits and evaluations.  This includes negative PE study.  She had a cath with a 75% LAD lesion which was intervened upon.  Since her catheterization she feels she has more heartburn.  Her breathing is worse with exertion.  Improves with rest.  It is also worse with cold weather.  Warm weather and humid air does not bother.  Symptoms localized to the chest.  Pertinent positives include burning sensation in the chest.  Pertinent negatives include no fever or hemoptysis.    Current Outpatient Medications   Medication Sig Dispense Refill     acetaminophen (TYLENOL) 500 MG tablet TAKE 1 PILL BY MOUTH EVERY 6 HOURS AS NEEDED FOR PAIN 100 tablet 10     albuterol (PROAIR HFA/PROVENTIL HFA/VENTOLIN HFA) 108 (90 Base) MCG/ACT inhaler Inhale 2 puffs into the lungs every 4 hours as needed for shortness of breath / dyspnea 4 g 11     albuterol (PROVENTIL) (2.5 MG/3ML) 0.083% neb solution Take 1 vial (2.5 mg) by nebulization every 6 hours as needed 90 mL 11     ALLERGY RELIEF 10 MG tablet TAKE 1  TABLET (10 MG TOTAL) BY MOUTH DAILY FOR ALLERGIES 30 tablet 3     amitriptyline (ELAVIL) 10 MG tablet TAKE 2 TABLETS (20 MG TOTAL) BY MOUTH AT BEDTIME. 180 tablet 3     ASPIRIN LOW DOSE 81 MG EC tablet TAKE 1 TABLET (81 MG TOTAL) BY MOUTH DAILY. 90 tablet 3     atorvastatin (LIPITOR) 80 MG tablet TAKE 1 TABLET (80 MG TOTAL) BY MOUTH AT BEDTIME FOR CHOLESTEROL 90 tablet 3     B-D U/F 31G X 8 MM insulin pen needle USE ONE PEN NEEDLE DAILY AS NEEDED FOR  each 1     Continuous Blood Gluc  (FREESTYLE MONICA 14 DAY READER) MICHAEL Uses daily       Continuous Blood Gluc Sensor (FREESTYLE MONICA 14 DAY SENSOR) MISC USE 1 UNITS AS DIRECTED EVERY 14 (FOURTEEN) DAYS. 2 each 3     CONTOUR NEXT TEST test strip Pt checking blood sugar 2x per day       cyclobenzaprine (FLEXERIL) 5 MG tablet TAKE 1-2 TABLETS (5-10 MG) BY MOUTH 3 TIMES DAILY AS NEEDED FOR MUSCLE SPASMS 30 tablet 3     dextromethorphan (DELSYM) 30 MG/5ML liquid TAKE 10 MLS (60 MG) BY MOUTH 2 TIMES DAILY AS NEEDED FOR COUGH 178 mL 0     Dupilumab (DUPIXENT) 300 MG/2ML SOPN Inject 300 mg Subcutaneous every 14 days 2 mL 3     famotidine (PEPCID) 20 MG tablet Take 1 tablet (20 mg) by mouth 2 times daily 90 tablet 3     fluticasone-vilanterol (BREO ELLIPTA) 200-25 MCG/INH inhaler Inhale 1 puff into the lungs daily 60 each 11     gabapentin (NEURONTIN) 300 MG capsule Take 2 capsules (600 mg) by mouth 3 times daily 180 capsule 3     Global Inject Ease Lancets 30G MISC USE 1 EACH AS DIRECTED 3 (THREE) TIMES A DAY. DISPENSE BRAND PER PATIENT'S INSURANCE AT PHARMACY DISCRETION.(E11.9) 100 each 3     guaiFENesin (ROBITUSSIN) 100 MG/5ML liquid Take 10 mLs (200 mg) by mouth every 4 hours as needed for cough 200 mL 1     hydrochlorothiazide (MICROZIDE) 12.5 MG capsule TAKE 1 CAPSULE (12.5 MG TOTAL) BY MOUTH DAILY FOR BLOOD PRESSURE 90 capsule 3     hydrocortisone (CORTAID) 1 % external cream Apply topically 2 times daily 60 g 0     insulin aspart (NOVOLOG PEN) 100  UNIT/ML pen 10 U BID before meals 15 mL 4     Insulin Aspart FlexPen 100 UNIT/ML SOPN GIVE BEFORE MEALS: FOR PRE-MEAL GLUCOSE: 140-189 GIVE 1 UNIT, 190-239 GIVE 2 UNITS, 240-289 GIVE 3 UNITS, 290-339 GIVE 4 UNITS, =OR>340 GIVE 6 UNITS *84* 15 mL 0     insulin glargine (LANTUS PEN) 100 UNIT/ML pen Inject 10 Units Subcutaneous every morning AND 24 Units At Bedtime. 15 mL 3     insulin pen needle (32G X 4 MM) 32G X 4 MM miscellaneous Use one pen needles daily or as directed. 200 each 11     ipratropium - albuterol 0.5 mg/2.5 mg/3 mL (DUONEB) 0.5-2.5 (3) MG/3ML neb solution Take 1 vial (3 mLs) by nebulization every 6 hours as needed for shortness of breath / dyspnea or wheezing 240 mL 11     lidocaine (XYLOCAINE) 5 % external ointment Apply topically 3 times daily as needed Small amount (finger tip amount) to chest tid prn pain 35.44 g 0     LORazepam (ATIVAN) 0.5 MG tablet Take 1 tablet (0.5 mg) by mouth 2 times daily as needed for agitation or anxiety 30 tablet 0     meclizine (ANTIVERT) 25 MG tablet Take 25 mg by mouth daily as needed        metFORMIN (GLUCOPHAGE) 1000 MG tablet Take 1 tablet (1,000 mg) by mouth 2 times daily (with meals) 180 tablet 1     metoprolol tartrate (LOPRESSOR) 25 MG tablet TAKE 1 TABLET (25 MG TOTAL) BY MOUTH 2 (TWO) TIMES A DAY FOR BLOOD PRESSURE 180 tablet 3     montelukast (SINGULAIR) 10 MG tablet Take 1 tablet (10 mg) by mouth At Bedtime 30 tablet 6     omeprazole (PRILOSEC) 40 MG DR capsule Take 1 capsule (40 mg) by mouth daily 90 capsule 3     ondansetron (ZOFRAN ODT) 4 MG ODT tab Take 1 tablet (4 mg) by mouth every 6 hours as needed for nausea 20 tablet 0     Polyethylene Glycol 3350 (PEG 3350) 17 GM/SCOOP POWD MIX 17 GRAMS WITH LIQUID AND DRINK ONCE DAILY FOR CONSTIPATION 510 g 12     polyvinyl alcohol (ARTIFICIAL TEARS) 1.4 % ophthalmic solution Place 1 drop into both eyes as needed for dry eyes 15 mL 3     sucralfate (CARAFATE) 1 GM tablet TAKE 1 TABLET (1 G TOTAL) BY MOUTH 4  (FOUR) TIMES A DAY BEFORE MEALS AND AT BEDTIME. TAKE 1 HOUR PRIOR TO MEALS 120 tablet 11     tiotropium (SPIRIVA RESPIMAT) 2.5 MCG/ACT inhalation aerosol Inhale 2 puffs into the lungs daily 4 g 1     predniSONE (DELTASONE) 20 MG tablet Take 2 tabs daily x 5 days (Patient not taking: Reported on 7/14/2022) 10 tablet 0     sucralfate (CARAFATE) 1 GM tablet Take 1 tablet (1 g) by mouth 4 times daily for 7 days 28 tablet 0     Social history:  Lives in a house built in 1963; no concern for mold in the house.  7 People live in the house including 2 children.  There are no pets in the house.  She is a never smoker and there is no second hand exposure to smoke.  She does not drink alcoholic beverages and denies indulging in recreational drugs.    Physical Exam   /60 (BP Location: Right arm, Patient Position: Chair, Cuff Size: Adult Regular)   Pulse 84   Wt 54.9 kg (121 lb)   SpO2 98%   BMI 23.26 kg/m    Physical Exam  Constitutional:       Appearance: Normal appearance.   HENT:      Head: Normocephalic.      Mouth/Throat:      Mouth: Mucous membranes are moist.   Cardiovascular:      Rate and Rhythm: Normal rate and regular rhythm.      Heart sounds: Normal heart sounds.   Pulmonary:      Effort: Pulmonary effort is normal.      Breath sounds: Normal breath sounds. No wheezing.   Abdominal:      General: Abdomen is flat.   Skin:     General: Skin is warm.   Neurological:      General: No focal deficit present.      Mental Status: She is alert.        Latest Reference Range & Units 02/07/22 15:47   Neutrophil Cytoplasmic Antibody <1:10  <1:10   Neutrophil Cytoplasmic Antibody Pattern  The ANCA IFA is <1:10.  No further testing will be performed.      Latest Reference Range & Units 02/07/22 15:47   Schistosoma Ab IgG Negative  Negative [1]   Strongyloides Bere IgG <=0.9 IV 0.4 [2]      Latest Reference Range & Units 02/07/22 15:47   Immunoglobulin E <=214 kU/L 74 [1]   Allergen Fungimold A Fumigatus IgE <=0.34 kU/L  <0.10 [2]   Aspergillus Fumagatis 1 Antibody None Detected  None Detected [3]   Aspergillus Fumagatis 6 Antibody None Detected  None Detected [4]   Allergen, Interp, Immunocap Score IgE  See Note [5]         Tamra Duong MD  Pulmonary and Critical Care  (P) [phone]

## 2022-07-14 NOTE — PROGRESS NOTES
"  Palpitations  ?? Anxiety elated. Will try Ativan as needed.  Instructed not to take more than 2 times a day.  Discussed potential habit-forming side effects.  Informed the patient that this is only for short-term treatment.  F/U in 4 weeks.  - TSH with free T4 reflex  - LORazepam (ATIVAN) 0.5 MG tablet; Take 1 tablet (0.5 mg) by mouth 2 times daily as needed for agitation or anxiety    Type 2 diabetes mellitus treated with insulin (H)  Has been splitting Lantus morning and evening.  We will change to 15 units in the morning and 15 units at night and continue mealtime insulin at current dose.    Chronic abdominal pain  - famotidine (PEPCID) 20 MG tablet; Take 1 tablet (20 mg) by mouth 2 times daily    SOB (shortness of breath)  Advised to keep appointment with pulmonology.    Coronary artery disease due to lipid rich plaque  Daughter-in-law has cardiology number to call and make an appointment.    Vaginal dryness, menopausal  Instructed not to use cortisone cream on her face.  - hydrocortisone (CORTAID) 1 % external cream; Apply topically 2 times daily    Doug Blum is a 52 year old accompanied by her Daughter-in-law, presenting for the following health issues:  Here for ED follow up.  Was in the ED 3 days ago due to CP, SOB and palpitation. Cardiac work up negative. Was not admited.   Due to follow up with Cardiology in 2-3 months per Cardiology telephone note from 5/22.  CP seems to be improving but c/o episodes of palpitations. States she \" worries too much \" . States she is afraid that she is going to die.No suicidal thoughts reported.  Sleeps ~ 5 hrs a day.    Checking BG twice a day, fasting and after dinner. Fasting BG always higher than evening BG per daughter-in-law.  Using Lantus 10 units in the morning and 20 units at night along with mealtime insulin 10 units before meals. Most fasting BG numbers are in the 200 range per daughter. No low BG with episodes of palpitations per patient.     Reports " "dry skin on face and neck. Using lotion as needed, not helping.     Sees multiple specialists.      Review of Systems   CONSTITUTIONAL: NEGATIVE for fever, chills, change in weight  CV: No chest pain today.      Objective    /70   Pulse 111   Temp 98.2  F (36.8  C) (Oral)   Resp 24   Ht 1.536 m (5' 0.47\")   Wt 54.5 kg (120 lb 1 oz)   SpO2 98%   BMI 23.08 kg/m    Body mass index is 23.08 kg/m .  Physical Exam   GENERAL: healthy, alert and no distress  NECK: no adenopathy, no asymmetry, masses, or scars and thyroid normal to palpation  RESP: lungs clear to auscultation - no rales, rhonchi or wheezes  CV: regular rate and rhythm, normal S1 S2, no S3 or S4, no murmur, click or rub, no peripheral edema and peripheral pulses strong  ABDOMEN: soft, nontender, no hepatosplenomegaly, no masses and bowel sounds normal                    .  ..  Answers for HPI/ROS submitted by the patient on 7/14/2022  If you checked off any problems, how difficult have these problems made it for you to do your work, take care of things at home, or get along with other people?: Very difficult  PHQ9 TOTAL SCORE: 7      "

## 2022-07-22 ENCOUNTER — TELEPHONE (OUTPATIENT)
Dept: PULMONOLOGY | Facility: OTHER | Age: 52
End: 2022-07-22

## 2022-07-22 ENCOUNTER — TELEPHONE (OUTPATIENT)
Dept: SLEEP MEDICINE | Facility: CLINIC | Age: 52
End: 2022-07-22

## 2022-07-22 DIAGNOSIS — J44.9 CHRONIC OBSTRUCTIVE PULMONARY DISEASE, UNSPECIFIED COPD TYPE (H): Primary | ICD-10-CM

## 2022-07-22 RX ORDER — PREDNISONE 20 MG/1
40 TABLET ORAL DAILY
Qty: 10 TABLET | Refills: 0 | Status: SHIPPED | OUTPATIENT
Start: 2022-07-22 | End: 2022-07-27

## 2022-07-22 NOTE — TELEPHONE ENCOUNTER
CALLED AND LVM LETTING PT KNOW THAT IF SHE IS HAVING ISSUES WITH HER DEVICE TO CALL US BACK. LEFT CALL BACK NUMBER

## 2022-07-22 NOTE — TELEPHONE ENCOUNTER
Daughter in law Billy called today to inform Kulwant is having increase in cough and wheezing. Denies fever or swelling of the legs. Will order actions plan of prednisone 40mg x 5 days per Dr. Duong orders. She will call us back if she has more issues.

## 2022-08-03 ENCOUNTER — OFFICE VISIT (OUTPATIENT)
Dept: PHARMACY | Facility: CLINIC | Age: 52
End: 2022-08-03
Payer: COMMERCIAL

## 2022-08-03 DIAGNOSIS — E11.9 TYPE 2 DIABETES MELLITUS TREATED WITHOUT INSULIN (H): Primary | ICD-10-CM

## 2022-08-03 DIAGNOSIS — J45.50 SEVERE PERSISTENT ASTHMA, UNSPECIFIED WHETHER COMPLICATED (H): ICD-10-CM

## 2022-08-03 DIAGNOSIS — K21.9 GASTROESOPHAGEAL REFLUX DISEASE WITHOUT ESOPHAGITIS: ICD-10-CM

## 2022-08-03 PROCEDURE — 99607 MTMS BY PHARM ADDL 15 MIN: CPT | Performed by: PHARMACIST

## 2022-08-03 PROCEDURE — 99605 MTMS BY PHARM NP 15 MIN: CPT | Performed by: PHARMACIST

## 2022-08-03 NOTE — PATIENT INSTRUCTIONS
Note to Home health nurse:   Change Loratadine 10 mg to once daily as needed instead of every day  Decrease sucralfate to 1 GM twice daily instead of four times daily  Resume use of novolog 10 units twice daily before meals  Change Lantus to 30 units once daily

## 2022-08-03 NOTE — PROGRESS NOTES
Medication Therapy Management (MTM) Encounter    ASSESSMENT:                            Medication Adherence/Access: No issues identified.    1. Type 2 diabetes mellitus treated without insulin (H)  A1c elevated, not meeting goal of <7%.  Elevated blood sugars likely due to patient discontinuing NovoLog.  Recommend patient resume twice daily NovoLog prior to meals and change Lantus insulin to once daily administration due to insulin duration of action and improved medication adherence with only once daily injection rather than twice daily.  Recommend patient continue to monitor blood sugars consistently.    2. Gastroesophageal reflux disease without esophagitis  Patient desiring to decrease dose of medications.  Reviewed with patient that her symptoms are well controlled on current medication, therefore only recommend slight decrease today, agreeable to trial of decrease sucralfate to twice daily administration today.  Though considering patient's history of GI distress would not recommend further decreasing dose until further work-up from GI.    3. Severe persistent asthma, unspecified whether complicated/allergies  Managed by pulmonologist.  Reviewed with patient concern for antihistamine side effects such as dry mouth, dry eyes, and constipation.  If loratadine not needed every day, recommend taking once daily as needed.    PLAN:                            1.  Updated insulin instructions today: Lantus 30 units once daily AND restart NovoLog 10 units twice daily before meals  2.  Patient to trial taking sucralfate 1 g twice daily, if symptoms resume recommend resuming four times daily dosing  3.  Patient to take loratadine 10 mg once daily as needed    Follow-up: Return in about 7 weeks (around 9/20/2022) for with me.    SUBJECTIVE/OBJECTIVE:                          Kulwant Amin is a 52 year old female coming in for an initial visit. She was referred to me from Adrien Cardoza. Patient was accompanied by daughter in  law and son.  (ID# [id number]) was used during today's visit.   Patient recently seen at [facility] for chest pain, discharged on 2022.    Reason for visit: MTM Initial visit. Per Patient - I am taking a lot of medications, wants to know what medications is for which indication.     Allergies/ADRs: Reviewed in chart  Past Medical History: Reviewed in chart  Tobacco: She reports that she quit smoking about 19 years ago. Her smoking use included cigarettes, cigarettes, and cigarettes. She has a 30.00 pack-year smoking history. She has never used smokeless tobacco.  Alcohol: Unable to assess with patient today    Medication Adherence/Access: Patient uses pill box(es). Nurse sets up medications in a pillbox, visits her home.  Patient presents with medication vials today.  Patient takes medications 4 time(s) per day.   Per patient, misses medication 0 times per week.     Type 2 Diabetes:  Currently taking metformin 1000 mg twice daily and lantus 15 units twice daily. Patient states that she stopped using the novolog, was under the impression to only take lantus since last visit with PCP, though provider indicated patient to take both Lantus and NovoLog.  Patient is not experiencing side effects.  Patient states that she wants to use as few injections as possible.  - Blood sugar monitorin time(s) daily. Ranges (patient reported): Does not bring in meter today. Reports blood sugar of 150 AM, 360 in PM.   Diet/Exercise: Discussed the importance of limiting excess sugar and carbohydrates. Twice daily meals.  Aspirin: Taking 81mg daily for secondary prevention, admits blood in stool at times, not right now.   Statin: Yes: atorvastatin 80   ACEi/ARB: No.  Not indicated  Lab Results   Component Value Date    A1C 10.9 2022    A1C 8.0 2021    A1C 9.2 2021      Latest Reference Range & Units 21 11:20   Microalbumin Urine mg/dL 0.00 - 1.99 mg/dL <0.50     GERD: omeprazole 40 mg daily,  famotidine 20 mg twice daily, sucralfate 1 GM four times daily.   - Per chart review patient recently seen in ED for chest pain, determined to be GI source.  - After taking these this has been helping, no concerns with heartburn today.  Patient wondering why she is taking so many medications for heartburn, patient and family desire to decrease pill burden if able.  - Has hemmeroids.  If having constipation she sees blood in stool. Notes this occurs about 1-2 times monthly. Has discussed this with PCP in the past.     Allergies/asthma: loartidine daily, montelukast 10 mg daily, Breo Ellipta 1 puff daily, Spiriva 2 puffs daily, albuterol neb three times daily every day.   - No pulmonary complaints today.   Sees pumlonologist,Tamra Duong MD.  Also receiving Dupixent injections.    Today's Vitals: There were no vitals taken for this visit.  ----------------  Post Discharge Medication Reconciliation Status: discharge medications reconciled and changed, per note/orders.    I spent 30 minutes with this patient today. All changes were made via collaborative practice agreement with Adrien Cardoza MD. A copy of the visit note was provided to the patient's provider(s).    The patient was given a summary of these recommendations.     Malu Gil, PharmD, BCACP  Medication Therapy Management Pharmacist     Medication Therapy Recommendations  Diabetes mellitus, type 2 (H)    Current Medication: insulin aspart (NOVOLOG PEN) 100 UNIT/ML pen   Rationale: Does not understand instructions - Adherence - Adherence   Recommendation: Provide Education   Status: Accepted per CPA

## 2022-08-04 ENCOUNTER — TRANSFERRED RECORDS (OUTPATIENT)
Dept: HEALTH INFORMATION MANAGEMENT | Facility: CLINIC | Age: 52
End: 2022-08-04

## 2022-08-05 ENCOUNTER — OFFICE VISIT (OUTPATIENT)
Dept: FAMILY MEDICINE | Facility: CLINIC | Age: 52
End: 2022-08-05
Payer: COMMERCIAL

## 2022-08-05 VITALS
SYSTOLIC BLOOD PRESSURE: 130 MMHG | BODY MASS INDEX: 22.4 KG/M2 | DIASTOLIC BLOOD PRESSURE: 89 MMHG | HEART RATE: 87 BPM | WEIGHT: 116.5 LBS | TEMPERATURE: 98.6 F | OXYGEN SATURATION: 99 % | RESPIRATION RATE: 14 BRPM

## 2022-08-05 DIAGNOSIS — J44.9 CHRONIC OBSTRUCTIVE PULMONARY DISEASE, UNSPECIFIED COPD TYPE (H): ICD-10-CM

## 2022-08-05 DIAGNOSIS — J44.1 COPD EXACERBATION (H): ICD-10-CM

## 2022-08-05 DIAGNOSIS — R05.9 COUGH: Primary | ICD-10-CM

## 2022-08-05 LAB — SARS-COV-2 RNA RESP QL NAA+PROBE: NEGATIVE

## 2022-08-05 PROCEDURE — U0005 INFEC AGEN DETEC AMPLI PROBE: HCPCS | Performed by: PHYSICIAN ASSISTANT

## 2022-08-05 PROCEDURE — U0003 INFECTIOUS AGENT DETECTION BY NUCLEIC ACID (DNA OR RNA); SEVERE ACUTE RESPIRATORY SYNDROME CORONAVIRUS 2 (SARS-COV-2) (CORONAVIRUS DISEASE [COVID-19]), AMPLIFIED PROBE TECHNIQUE, MAKING USE OF HIGH THROUGHPUT TECHNOLOGIES AS DESCRIBED BY CMS-2020-01-R: HCPCS | Performed by: PHYSICIAN ASSISTANT

## 2022-08-05 PROCEDURE — 99213 OFFICE O/P EST LOW 20 MIN: CPT | Mod: CS | Performed by: PHYSICIAN ASSISTANT

## 2022-08-05 RX ORDER — ACETAMINOPHEN 500 MG
500-1000 TABLET ORAL EVERY 6 HOURS PRN
Qty: 60 TABLET | Refills: 0 | Status: SHIPPED | OUTPATIENT
Start: 2022-08-05 | End: 2022-08-26

## 2022-08-05 RX ORDER — AZITHROMYCIN 250 MG/1
TABLET, FILM COATED ORAL
Qty: 6 TABLET | Refills: 0 | Status: SHIPPED | OUTPATIENT
Start: 2022-08-05 | End: 2022-08-10

## 2022-08-05 RX ORDER — PREDNISONE 20 MG/1
40 TABLET ORAL DAILY
Qty: 10 TABLET | Refills: 0 | Status: SHIPPED | OUTPATIENT
Start: 2022-08-05 | End: 2022-08-10

## 2022-08-05 NOTE — PROGRESS NOTES
Chief Complaint   Patient presents with     Cough     Chills, joint pain and headache x 1 week,        ASSESSMENT/PLAN:  Kulwant was seen today for cough.    Diagnoses and all orders for this visit:    Cough  -     Symptomatic; Unknown COVID-19 Virus (Coronavirus) by PCR Nose  -     acetaminophen (TYLENOL) 500 MG tablet; Take 1-2 tablets (500-1,000 mg) by mouth every 6 hours as needed for mild pain    Chronic obstructive pulmonary disease, unspecified COPD type (H)    COPD exacerbation (H)  -     azithromycin (ZITHROMAX) 250 MG tablet; Take 2 tablets (500 mg) by mouth daily for 1 day, THEN 1 tablet (250 mg) daily for 4 days.  -     predniSONE (DELTASONE) 20 MG tablet; Take 2 tablets (40 mg) by mouth daily for 5 days    Patient with change of cough, worsening wheeze and symptoms not improving after a week consistent with COPD exacerbation.  Prednisone burst and will cover for bacterial cause.  Follow-up if not improving in the next few days.    Denny Mace PA-C      SUBJECTIVE:  Kulwant is a 52 year old female who presents to urgent care with 1 week of cough, chills, fatigue.  Cough is changed in character.  Symptoms are worsening.  No significant chest pain.  Some mild shortness of breath and increased wheezing.  Has a history of COPD and asthma.  Headache.  No sore throat, nausea, vomiting, diarrhea abdominal pain..    ROS: Pertinent ROS neg other than the symptoms noted above in the HPI.     OBJECTIVE:  /89 (BP Location: Right arm, Patient Position: Sitting, Cuff Size: Adult Regular)   Pulse 87   Temp 98.6  F (37  C) (Oral)   Resp 14   Wt 52.8 kg (116 lb 8 oz)   SpO2 99%   BMI 22.40 kg/m     GENERAL: healthy, alert and no distress  EYES: Eyes grossly normal to inspection,  HENT: ear canals and TM's normal, nose and mouth without ulcers or lesions, no significant oropharynx erythema  NECK: no adenopathy, nontender  RESP: Wheeze heard throughout all lung fields, cough heard  CV: regular rate and rhythm,  normal S1 S2, no S3 or S4, no murmur, click or rub    DIAGNOSTICS    No results found for any visits on 08/05/22.     Current Outpatient Medications   Medication     acetaminophen (TYLENOL) 500 MG tablet     albuterol (PROAIR HFA/PROVENTIL HFA/VENTOLIN HFA) 108 (90 Base) MCG/ACT inhaler     albuterol (PROVENTIL) (2.5 MG/3ML) 0.083% neb solution     ALLERGY RELIEF 10 MG tablet     amitriptyline (ELAVIL) 10 MG tablet     ASPIRIN LOW DOSE 81 MG EC tablet     atorvastatin (LIPITOR) 80 MG tablet     B-D U/F 31G X 8 MM insulin pen needle     Continuous Blood Gluc  (FREESTYLE MONICA 14 DAY READER) MICHAEL     Continuous Blood Gluc Sensor (Meine SpielzeugkisteSTYLE MONICA 14 DAY SENSOR) MISC     CONTOUR NEXT TEST test strip     dextromethorphan (DELSYM) 30 MG/5ML liquid     Dupilumab (DUPIXENT) 300 MG/2ML SOPN     famotidine (PEPCID) 20 MG tablet     fluticasone-vilanterol (BREO ELLIPTA) 200-25 MCG/INH inhaler     gabapentin (NEURONTIN) 300 MG capsule     Global Inject Ease Lancets 30G MISC     guaiFENesin (ROBITUSSIN) 100 MG/5ML liquid     hydrochlorothiazide (MICROZIDE) 12.5 MG capsule     hydrocortisone (CORTAID) 1 % external cream     insulin glargine (LANTUS PEN) 100 UNIT/ML pen     insulin pen needle (32G X 4 MM) 32G X 4 MM miscellaneous     ipratropium - albuterol 0.5 mg/2.5 mg/3 mL (DUONEB) 0.5-2.5 (3) MG/3ML neb solution     lidocaine (XYLOCAINE) 5 % external ointment     LORazepam (ATIVAN) 0.5 MG tablet     meclizine (ANTIVERT) 25 MG tablet     metFORMIN (GLUCOPHAGE) 1000 MG tablet     metoprolol tartrate (LOPRESSOR) 25 MG tablet     montelukast (SINGULAIR) 10 MG tablet     omeprazole (PRILOSEC) 40 MG DR capsule     Polyethylene Glycol 3350 (PEG 3350) 17 GM/SCOOP POWD     polyvinyl alcohol (ARTIFICIAL TEARS) 1.4 % ophthalmic solution     sucralfate (CARAFATE) 1 GM tablet     tiotropium (SPIRIVA RESPIMAT) 2.5 MCG/ACT inhalation aerosol     cyclobenzaprine (FLEXERIL) 5 MG tablet     insulin aspart (NOVOLOG PEN) 100 UNIT/ML pen      Insulin Aspart FlexPen 100 UNIT/ML SOPN     predniSONE (DELTASONE) 20 MG tablet     No current facility-administered medications for this visit.      Patient Active Problem List   Diagnosis     Pulmonary nodule, right     Environmental allergies     TB lung, latent     COPD (chronic obstructive pulmonary disease)/Asthma      HTN (hypertension)     Hyperthyroidism     Cataracts, bilateral     Corneal opacity     Irregular astigmatism     Anxiety     Chronic nonintractable headache, unspecified headache type     Coronary artery disease due to lipid rich plaque     Dizziness     Hyperlipidemia     Moderate major depression (H)     Moderate persistent asthma     Unspecified visual loss     GERD (gastroesophageal reflux disease)     Dental caries     H/O arterial ischemic stroke     Constipation     Dysphagia     Chronic abdominal pain     Insomnia     FLACO (obstructive sleep apnea)- mild (AHI 14)     Chest pain     Chest pain, unspecified type     Diabetes mellitus, type 2 (H)      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     Microcytic anemia 11/17/2019     S/P coronary artery stent placement 02/02/2018     TB lung, latent     9 mos INH     Past Surgical History:   Procedure Laterality Date     CORONARY STENT PLACEMENT  2018     CV CORONARY ANGIOGRAM N/A 1/25/2018    Procedure: Coronary Angiogram;  Surgeon: Angelo Serrano MD;  Location: [facility];  Service:      CV CORONARY  ANGIOGRAM N/A 2022    Procedure: Coronary Angiogram;  Surgeon: Irwin Cano MD;  Location: [facility] CV     CV CORONARY ANGIOGRAM  2022    Procedure: ;  Surgeon: Irwin Cano MD;  Location: [facility] CV     RI ESOPHAGOGASTRODUODENOSCOPY TRANSORAL DIAGNOSTIC N/A 12/10/2018    Procedure: ESOPHAGOGASTRODUODENOSCOPY (EGD);  Surgeon: Eddie Renteria MD;  Location: [facility];  Service: Gastroenterology     RI ESOPHAGOGASTRODUODENOSCOPY TRANSORAL DIAGNOSTIC N/A 12/3/2020    Procedure: ESOPHAGOGASTRODUODENOSCOPY (EGD) with biospies ;  Surgeon: Avi Crow MD;  Location: [facility];  Service: Gastroenterology     [facility] COLONOSCOPY W/WO BRUSH/WASH N/A 12/10/2018    Procedure: COLONOSCOPY with polypectomy using biopsy forceps;  Surgeon: Eddie Renteria MD;  Location: [facility];  Service: Gastroenterology     Family History   Problem Relation Age of Onset     Other - See Comments Mother          of an intestinal problem     Ulcers Father          of gastritis     Breast Cancer No family hx of      Social History     Tobacco Use     Smoking status: Former Smoker     Packs/day: 1.00     Years: 30.00     Pack years: 30.00     Types: Cigarettes, Cigarettes, Cigarettes     Quit date: 2003     Years since quittin.6     Smokeless tobacco: Never Used     Tobacco comment: No passive exposure   Substance Use Topics     Alcohol use: No              The plan of care was discussed with the patient. They understand and agree with the course of treatment prescribed. A printed summary was given including instructions and medications.  The use of Dragon/[employer] dictation services may have been used to construct the content in this note; any grammatical or spelling errors are non-intentional. Please contact the author of this note directly if you are in need of any clarification.

## 2022-08-15 ENCOUNTER — OFFICE VISIT (OUTPATIENT)
Dept: OTOLARYNGOLOGY | Facility: CLINIC | Age: 52
End: 2022-08-15
Payer: COMMERCIAL

## 2022-08-15 DIAGNOSIS — R05.3 CHRONIC COUGH: Primary | ICD-10-CM

## 2022-08-15 PROCEDURE — 92507 TX SP LANG VOICE COMM INDIV: CPT | Mod: GN | Performed by: SPEECH-LANGUAGE PATHOLOGIST

## 2022-08-15 NOTE — PROGRESS NOTES
THERAPY NOTE (CPT 29043)  Date of Service: 8/15/2022  Ms. Amin was seent today, 8/15/2022, for speech therapy session number 1 for treatment of chronic cough related to irritable larynx syndrome.  Patient was seen with the assistance of a [place] approved telephone .  Her daughter-in-law was also present and assisted in helping the patient to perform cough suppression strategies.    SUBJECTIVE / OBJECTIVE:  Since our most-recent session, Ms. Amin reports that there has been no change in her cough.    THERAPEUTIC ACTIVITIES  Counseling and Education    Asked many questions about the nature of her symptoms, and I answered all of these thoroughly.  Cough suppression strategies    Identification of precursor sensation    Sensation of soreness/scratchiness in the base of her throat    Replacement behaviors    Hard swallows    Sips of very cold liquid    Slow inhalation through a straw followed by slow exhalation through a straw    Gentle humming on sustained and variegated pitch patterns with a focus on anterior facial vibration    The patient required maximum cueing in order to implement the above replacement behaviors, but in doing so, was able to entirely eliminate cough.  She benefited from particular from cues to avoid excessive inhalation and excessive inhalation, both of which seemed to increase her propensity to cough      I provided handouts of today's therapeutic activities to facilitate practice.    ASSESSMENT/PLAN  PROGRESS TOWARD LONG TERM GOALS:   Adequate progress; too early for objective measures    IMPRESSIONS: Chronic Cough (R05.3) in the context of Irritable Larynx Syndrome (ILS) (J38.7). Ms. Amin demonstrates good initial learning of cough suppression strategies.  Ongoing skilled intervention is required in order to ensure that the patient is able to apply the strategies to all daily activities.    PLAN: I will see Ms. Amin in 3 weeks, at which time we will meet virtually and  continue to work toward increasing consistency of cough suppression.     Kunal Mendoza, Ph.D., [facility]-SLP  Speech-Language Pathologist-[facility]  [phone]  he/him/his    Speech recognition software may have been used in the above documentation; input is reviewed before signature to the best of my ability.

## 2022-08-15 NOTE — LETTER
8/15/2022       RE: Kulwant Amin  [address] [address]     Dear Colleague,    Thank you for referring your patient, Kulwant Amin, to the [facility] [place] [facility]. Please see a copy of my visit note below.    THERAPY NOTE (CPT 79339)  Date of Service: 8/15/2022  Ms. Amin was seent today, 8/15/2022, for speech therapy session number 1 for treatment of chronic cough related to irritable larynx syndrome.  Patient was seen with the assistance of a [place] approved telephone .  Her daughter-in-law was also present and assisted in helping the patient to perform cough suppression strategies.    SUBJECTIVE / OBJECTIVE:  Since our most-recent session, Ms. Amin reports that there has been no change in her cough.    THERAPEUTIC ACTIVITIES  Counseling and Education    Asked many questions about the nature of her symptoms, and I answered all of these thoroughly.  Cough suppression strategies    Identification of precursor sensation    Sensation of soreness/scratchiness in the base of her throat    Replacement behaviors    Hard swallows    Sips of very cold liquid    Slow inhalation through a straw followed by slow exhalation through a straw    Gentle humming on sustained and variegated pitch patterns with a focus on anterior facial vibration    The patient required maximum cueing in order to implement the above replacement behaviors, but in doing so, was able to entirely eliminate cough.  She benefited from particular from cues to avoid excessive inhalation and excessive inhalation, both of which seemed to increase her propensity to cough      I provided handouts of today's therapeutic activities to facilitate practice.    ASSESSMENT/PLAN  PROGRESS TOWARD LONG TERM GOALS:   Adequate progress; too early for objective measures    IMPRESSIONS: Chronic Cough (R05.3) in the context of Irritable Larynx Syndrome (ILS)  (J38.7). Ms. Amin demonstrates good initial learning of cough suppression strategies.  Ongoing skilled intervention is required in order to ensure that the patient is able to apply the strategies to all daily activities.    PLAN: I will see Ms. Amin in 3 weeks, at which time we will meet virtually and continue to work toward increasing consistency of cough suppression.     Kunal Mendoza, Ph.D., [facility]-SLP  Speech-Language Pathologist-[facility]  [phone]  he/him/his    Speech recognition software may have been used in the above documentation; input is reviewed before signature to the best of my ability.         Again, thank you for allowing me to participate in the care of your patient.      Sincerely,    Kunal Mendoza, SLP

## 2022-08-16 NOTE — TELEPHONE ENCOUNTER
Patient should be on Breo, not Dulera according to our records.  
PROVIDER:[TOKEN:[[id number]],FOLLOWUP:[1 week]],PROVIDER:[TOKEN:[[id number]],FOLLOWUP:[2 weeks]],PROVIDER:[TOKEN:[[id number]],FOLLOWUP:[1-3 days]]

## 2022-08-19 DIAGNOSIS — M54.41 CHRONIC BILATERAL LOW BACK PAIN WITH BILATERAL SCIATICA: ICD-10-CM

## 2022-08-19 DIAGNOSIS — G89.29 CHRONIC BILATERAL LOW BACK PAIN WITH BILATERAL SCIATICA: ICD-10-CM

## 2022-08-19 DIAGNOSIS — M79.18 MYOFASCIAL PAIN: ICD-10-CM

## 2022-08-19 DIAGNOSIS — M54.42 CHRONIC BILATERAL LOW BACK PAIN WITH BILATERAL SCIATICA: ICD-10-CM

## 2022-08-19 NOTE — TELEPHONE ENCOUNTER
Pharmacy sent refill request for Flexeril   --Med last Rx 2/14/22 #30, 3 refills   --Last OV 7/29/21   --Future appt: none  --Pt needs to schedule F/U since it's been over 1 year

## 2022-08-22 RX ORDER — CYCLOBENZAPRINE HCL 5 MG
5-10 TABLET ORAL 3 TIMES DAILY PRN
Qty: 30 TABLET | Refills: 3 | Status: SHIPPED | OUTPATIENT
Start: 2022-08-22 | End: 2022-12-09

## 2022-08-23 ENCOUNTER — OFFICE VISIT (OUTPATIENT)
Dept: ALLERGY | Facility: CLINIC | Age: 52
End: 2022-08-23
Payer: COMMERCIAL

## 2022-08-23 ENCOUNTER — TELEPHONE (OUTPATIENT)
Dept: SLEEP MEDICINE | Facility: CLINIC | Age: 52
End: 2022-08-23

## 2022-08-23 VITALS — BODY MASS INDEX: 22.3 KG/M2 | OXYGEN SATURATION: 97 % | WEIGHT: 116 LBS | HEART RATE: 97 BPM

## 2022-08-23 DIAGNOSIS — J45.51 SEVERE PERSISTENT ASTHMA WITH EXACERBATION (H): Primary | ICD-10-CM

## 2022-08-23 DIAGNOSIS — J45.50 SEVERE PERSISTENT ASTHMA WITHOUT COMPLICATION (H): ICD-10-CM

## 2022-08-23 DIAGNOSIS — G47.33 OSA (OBSTRUCTIVE SLEEP APNEA): Primary | ICD-10-CM

## 2022-08-23 PROCEDURE — 99214 OFFICE O/P EST MOD 30 MIN: CPT | Performed by: ALLERGY & IMMUNOLOGY

## 2022-08-23 RX ORDER — DUPILUMAB 300 MG/2ML
300 INJECTION, SOLUTION SUBCUTANEOUS
Qty: 2 ML | Refills: 3 | Status: SHIPPED | OUTPATIENT
Start: 2022-08-23 | End: 2022-09-20

## 2022-08-23 RX ORDER — PREDNISONE 20 MG/1
20 TABLET ORAL 2 TIMES DAILY
Qty: 10 TABLET | Refills: 0 | Status: SHIPPED | OUTPATIENT
Start: 2022-08-23 | End: 2022-10-28

## 2022-08-23 NOTE — LETTER
8/23/2022         RE: Kulwant Amin  [address] [facility] [address]        Dear Colleague,    Thank you for referring your patient, Kulwant Amin, to the [facility]. Please see a copy of my visit note below.          Subjective   Kulwant is a 52 year old, presenting for the following health issues:  RECHECK (Medicine check)      HPI     Chief complaint:  Asthma follow-up    History of Present Illness:  This is a pleasant 52 year old woman I have seen previously for severe persistent asthma.  I last saw this patient in March.  At that time she had been started on Dupixent and was doing well.  For unclear reasons, she stopped using the Dupixent as it appears she states the pharmacy stop sending her to medication.  With this she had increased coughing, wheezing or shortness of breath.  She is going to see her pulmonologist on Friday.  She has used prednisone this summer but did not use it recently.  She is worried she can have a refill of prednisone as she is having some increased shortness of breath today.  She had no trouble with the Dupixent and felt that it did improve symptoms.    Review of Systems         Objective    Pulse 97   Wt 52.6 kg (116 lb)   SpO2 97%   BMI 22.30 kg/m    Body mass index is 22.3 kg/m .  Physical Exam   Gen: Pleasant female not in acute distress  HEENT: Eyes no erythema of the bulbar or palpebral conjunctiva, no edema. Respiratory: Very tight on her expiratory phase posteriorly    Psych: Alert and oriented times 3        Impression report and plan:  1.  Severe persistent asthma    Restart Dupixent.  I asked her daughter-in-law to let me know if they control with pharmacy in the future.  Asked her not to stop Dupixent.  I did offer that she received Dupixent in office but they would prefer to use this at home.  Her daughter-in-law feels that she can administer safely and correctly at home.  I did give her some prednisone to use until she sees her  pulmonologist on Friday.  Recommended 20 mg twice daily for 5 days.  She states that typically this is her dose for asthma exacerbations.  I would like her to follow in 6 months          .  ..      Again, thank you for allowing me to participate in the care of your patient.        Sincerely,        Elizabeth REYNOSO MD

## 2022-08-23 NOTE — TELEPHONE ENCOUNTER
LVM LETTING THE PT KNOW THAT SHE WAS NOT COMPLIANT AND WOULD NEED TO RETURN THE MACHINE TO ONE OF OUR SHOWROOMS.

## 2022-08-23 NOTE — PATIENT INSTRUCTIONS
Dupixent every 2 weeks    Prednisone 1 tab twice daily for 5 days    Follow in 6 months      Call me if they stop sending Dupixent

## 2022-08-23 NOTE — Clinical Note
For some reason, this patient Dupixent was stopped.  It sounds like there was some miscommunication between pharmacy.  I have restarted it.  Hopefully this will improve symptoms.  I think she is seeing you this Friday.  It is wanted to give you a heads up.  Thank you, Elizabeth Marie

## 2022-08-23 NOTE — PROGRESS NOTES
Doug Blum is a 52 year old, presenting for the following health issues:  RECHECK (Medicine check)      HPI     Chief complaint:  Asthma follow-up    History of Present Illness:  This is a pleasant 52 year old woman I have seen previously for severe persistent asthma.  I last saw this patient in March.  At that time she had been started on Dupixent and was doing well.  For unclear reasons, she stopped using the Dupixent as it appears she states the pharmacy stop sending her to medication.  With this she had increased coughing, wheezing or shortness of breath.  She is going to see her pulmonologist on Friday.  She has used prednisone this summer but did not use it recently.  She is worried she can have a refill of prednisone as she is having some increased shortness of breath today.  She had no trouble with the Dupixent and felt that it did improve symptoms.    Review of Systems         Objective    Pulse 97   Wt 52.6 kg (116 lb)   SpO2 97%   BMI 22.30 kg/m    Body mass index is 22.3 kg/m .  Physical Exam   Gen: Pleasant female not in acute distress  HEENT: Eyes no erythema of the bulbar or palpebral conjunctiva, no edema. Respiratory: Very tight on her expiratory phase posteriorly    Psych: Alert and oriented times 3        Impression report and plan:  1.  Severe persistent asthma    Restart Dupixent.  I asked her daughter-in-law to let me know if they control with pharmacy in the future.  Asked her not to stop Dupixent.  I did offer that she received Dupixent in office but they would prefer to use this at home.  Her daughter-in-law feels that she can administer safely and correctly at home.  I did give her some prednisone to use until she sees her pulmonologist on Friday.  Recommended 20 mg twice daily for 5 days.  She states that typically this is her dose for asthma exacerbations.  I would like her to follow in 6 months          .  ..

## 2022-08-25 ENCOUNTER — OFFICE VISIT (OUTPATIENT)
Dept: FAMILY MEDICINE | Facility: CLINIC | Age: 52
End: 2022-08-25
Payer: COMMERCIAL

## 2022-08-25 VITALS
HEART RATE: 83 BPM | TEMPERATURE: 97.8 F | RESPIRATION RATE: 24 BRPM | SYSTOLIC BLOOD PRESSURE: 104 MMHG | HEIGHT: 60 IN | WEIGHT: 118.25 LBS | BODY MASS INDEX: 23.22 KG/M2 | DIASTOLIC BLOOD PRESSURE: 62 MMHG | OXYGEN SATURATION: 99 %

## 2022-08-25 DIAGNOSIS — M25.561 PAIN IN BOTH KNEES, UNSPECIFIED CHRONICITY: ICD-10-CM

## 2022-08-25 DIAGNOSIS — M25.562 PAIN IN BOTH KNEES, UNSPECIFIED CHRONICITY: ICD-10-CM

## 2022-08-25 DIAGNOSIS — G47.33 OSA (OBSTRUCTIVE SLEEP APNEA): ICD-10-CM

## 2022-08-25 DIAGNOSIS — E11.9 TYPE 2 DIABETES MELLITUS TREATED WITH INSULIN (H): Primary | ICD-10-CM

## 2022-08-25 DIAGNOSIS — K21.9 GASTROESOPHAGEAL REFLUX DISEASE WITHOUT ESOPHAGITIS: ICD-10-CM

## 2022-08-25 DIAGNOSIS — Z79.4 TYPE 2 DIABETES MELLITUS TREATED WITH INSULIN (H): Primary | ICD-10-CM

## 2022-08-25 DIAGNOSIS — Z86.73 H/O ARTERIAL ISCHEMIC STROKE: ICD-10-CM

## 2022-08-25 DIAGNOSIS — I10 ESSENTIAL HYPERTENSION: ICD-10-CM

## 2022-08-25 DIAGNOSIS — J45.40 MODERATE PERSISTENT ASTHMA WITHOUT COMPLICATION: ICD-10-CM

## 2022-08-25 LAB — HBA1C MFR BLD: 10.2 % (ref 0–5.6)

## 2022-08-25 PROCEDURE — 99214 OFFICE O/P EST MOD 30 MIN: CPT | Mod: 25 | Performed by: FAMILY MEDICINE

## 2022-08-25 PROCEDURE — 0064A COVID-19,PF,MODERNA (18+ YRS BOOSTER .25ML): CPT | Performed by: FAMILY MEDICINE

## 2022-08-25 PROCEDURE — 36415 COLL VENOUS BLD VENIPUNCTURE: CPT | Performed by: FAMILY MEDICINE

## 2022-08-25 PROCEDURE — 91306 COVID-19,PF,MODERNA (18+ YRS BOOSTER .25ML): CPT | Performed by: FAMILY MEDICINE

## 2022-08-25 PROCEDURE — 83036 HEMOGLOBIN GLYCOSYLATED A1C: CPT | Performed by: FAMILY MEDICINE

## 2022-08-25 RX ORDER — AMITRIPTYLINE HYDROCHLORIDE 10 MG/1
TABLET ORAL
Qty: 180 TABLET | Refills: 3 | Status: SHIPPED | OUTPATIENT
Start: 2022-08-25 | End: 2023-02-15

## 2022-08-25 RX ORDER — METOPROLOL TARTRATE 25 MG/1
TABLET, FILM COATED ORAL
Qty: 180 TABLET | Refills: 3 | Status: SHIPPED | OUTPATIENT
Start: 2022-08-25 | End: 2023-01-03

## 2022-08-25 ASSESSMENT — PATIENT HEALTH QUESTIONNAIRE - PHQ9
10. IF YOU CHECKED OFF ANY PROBLEMS, HOW DIFFICULT HAVE THESE PROBLEMS MADE IT FOR YOU TO DO YOUR WORK, TAKE CARE OF THINGS AT HOME, OR GET ALONG WITH OTHER PEOPLE: SOMEWHAT DIFFICULT
SUM OF ALL RESPONSES TO PHQ QUESTIONS 1-9: 15
SUM OF ALL RESPONSES TO PHQ QUESTIONS 1-9: 15

## 2022-08-25 NOTE — PROGRESS NOTES
Type 2 diabetes mellitus treated with insulin (H)  Increased Lantus to 40 units at night.  A1c 10.2 today.  - metFORMIN (GLUCOPHAGE) 1000 MG tablet; Take 1 tablet (1,000 mg) by mouth 2 times daily (with meals)  - Hemoglobin A1c; Future  - Hemoglobin A1c    Essential hypertension  Controlled.      Moderate persistent asthma without complication  Managed by pulmonology.  Instructed to keep follow-up appointment.    FLACO (obstructive sleep apnea)- mild (AHI 14)  Managed by sleep medicine.  Instructed to keep future appointment.    Gastroesophageal reflux disease without esophagitis  No acute symptoms.    Pain in both knees, unspecified chronicity  We will try Voltaren gel.  - diclofenac (VOLTAREN) 1 % topical gel; Apply 4 g topically 4 times daily    Subjective   Kulwant is a 52 year old accompanied by her Daughter-in-law, presenting for the following health issues:  follow up  (Med check )  Currently using Lantus 30 units at night and mealtime insulin 10 units before meals.  Fasting blood sugar mostly in the high 102 100 range with postprandial in the high 200s to 300s range.  She denied recent history of hypoglycemic symptoms.  She is complaining of bilateral knee pain.  No injury to the knees.  No swelling or redness.  This pain has been going on for several years.  She feels weakness in her lower extremities.    She sees multiple specialties including pulmonology, sleep medicine, cardiology and allergy.  She is doing fairly well today compared to the previous visits with no concerning acute symptoms other than knee pain.    She has history of depression but denied suicidal homicidal ideations or intent    [id number]}        Review of Systems   CONSTITUTIONAL: NEGATIVE for fever, chills, change in weight  ENT/MOUTH: NEGATIVE for ear, mouth and throat problems  CV: NEGATIVE for chest pain, palpitations or peripheral edema      Objective    /62 (BP Location: Right arm, Patient Position: Sitting, Cuff Size: Adult  "Regular)   Pulse 83   Temp 97.8  F (36.6  C) (Oral)   Resp 24   Ht 1.536 m (5' 0.47\")   Wt 53.6 kg (118 lb 4 oz)   SpO2 99%   BMI 22.74 kg/m    Body mass index is 22.74 kg/m .  Physical Exam   GENERAL: healthy, alert and no distress  NECK: no adenopathy, no asymmetry, masses, or scars and thyroid normal to palpation  RESP: lungs clear to auscultation - no rales, rhonchi or wheezes  CV: regular rate and rhythm, normal S1 S2, no S3 or S4, no murmur, click or rub, no peripheral edema and peripheral pulses strong  ABDOMEN: soft, nontender, no hepatosplenomegaly, no masses and bowel sounds normal  MS: KNEES-no effusion.  No erythema or swelling.  Mild tenderness medial joint line bilaterally.                    .  ..  Answers for HPI/ROS submitted by the patient on 8/25/2022  If you checked off any problems, how difficult have these problems made it for you to do your work, take care of things at home, or get along with other people?: Somewhat difficult  PHQ9 TOTAL SCORE: 15  What is the reason for your visit today? : follow  up med check  How many servings of fruits and vegetables do you eat daily?: 2-3  On average, how many sweetened beverages do you drink each day (Examples: soda, juice, sweet tea, etc.  Do NOT count diet or artificially sweetened beverages)?: 0  How many minutes a day do you exercise enough to make your heart beat faster?: 9 or less  How many days a week do you exercise enough to make your heart beat faster?: 3 or less  How many days per week do you miss taking your medication?: 0      "

## 2022-08-26 ENCOUNTER — OFFICE VISIT (OUTPATIENT)
Dept: PULMONOLOGY | Facility: OTHER | Age: 52
End: 2022-08-26
Payer: COMMERCIAL

## 2022-08-26 VITALS
OXYGEN SATURATION: 99 % | BODY MASS INDEX: 22.77 KG/M2 | SYSTOLIC BLOOD PRESSURE: 110 MMHG | HEART RATE: 98 BPM | DIASTOLIC BLOOD PRESSURE: 60 MMHG | WEIGHT: 118.4 LBS

## 2022-08-26 DIAGNOSIS — J44.9 CHRONIC OBSTRUCTIVE PULMONARY DISEASE, UNSPECIFIED COPD TYPE (H): ICD-10-CM

## 2022-08-26 DIAGNOSIS — R06.02 SOB (SHORTNESS OF BREATH): ICD-10-CM

## 2022-08-26 PROCEDURE — 99214 OFFICE O/P EST MOD 30 MIN: CPT | Performed by: INTERNAL MEDICINE

## 2022-08-26 RX ORDER — ALBUTEROL SULFATE 90 UG/1
2 AEROSOL, METERED RESPIRATORY (INHALATION) EVERY 4 HOURS PRN
Qty: 4 G | Refills: 11 | Status: SHIPPED | OUTPATIENT
Start: 2022-08-26 | End: 2023-10-05

## 2022-08-26 RX ORDER — IPRATROPIUM BROMIDE AND ALBUTEROL SULFATE 2.5; .5 MG/3ML; MG/3ML
3 SOLUTION RESPIRATORY (INHALATION) EVERY 6 HOURS PRN
Qty: 360 ML | Refills: 11 | Status: SHIPPED | OUTPATIENT
Start: 2022-08-26 | End: 2023-11-07

## 2022-08-26 ASSESSMENT — ASTHMA QUESTIONNAIRES
QUESTION_4 LAST FOUR WEEKS HOW OFTEN HAVE YOU USED YOUR RESCUE INHALER OR NEBULIZER MEDICATION (SUCH AS ALBUTEROL): ONE OR TWO TIMES PER DAY
ACT_TOTALSCORE: 12
QUESTION_5 LAST FOUR WEEKS HOW WOULD YOU RATE YOUR ASTHMA CONTROL: POORLY CONTROLLED
EMERGENCY_ROOM_LAST_YEAR_TOTAL: ONE
QUESTION_3 LAST FOUR WEEKS HOW OFTEN DID YOUR ASTHMA SYMPTOMS (WHEEZING, COUGHING, SHORTNESS OF BREATH, CHEST TIGHTNESS OR PAIN) WAKE YOU UP AT NIGHT OR EARLIER THAN USUAL IN THE MORNING: FOUR OR MORE NIGHTS A WEEK
ACT_TOTALSCORE: 12
QUESTION_1 LAST FOUR WEEKS HOW MUCH OF THE TIME DID YOUR ASTHMA KEEP YOU FROM GETTING AS MUCH DONE AT WORK, SCHOOL OR AT HOME: NONE OF THE TIME
QUESTION_2 LAST FOUR WEEKS HOW OFTEN HAVE YOU HAD SHORTNESS OF BREATH: ONCE A DAY

## 2022-08-26 NOTE — PROGRESS NOTES
Assessment/Plan:    50 y.o.-year-old woman with history of organic fuel exposure in the home was previously had nondiagnostic PFTs.  She was previously evaluated by Dr. Marie and initiated on Dupixent for her hypereosinophilia with some response to this. Her hypereosinophilia evaluation has been unremarkable.  For the present we would recommend;    Continue Breo Ellipta 1 puff daily.  She knows to gargle after using this.    Continue Spiriva.    Continue to use 3 times daily albuterol nebs.    Continue Protonix 40 mg daily.    Continue Dupixent injections (increased dose).    As needed albuterol for rescue.    Has received both doses of her Covid-19 vaccine.    We will send a note to Dr. Cardoza as I think that the patient may have delayed gastric motility based on her symptoms presently.  She is on gabapentin already and I am not sure what other interventions we could offer her, however, it may be worthwhile for her to be seen by endocrinology.    I advised her daughter know to initiate Tums on her in addition to her present regimen of medications to see if this alleviates her symptoms.  She has recently undergone LFTs and abdominal imaging which has not demonstrated any significant abnormalities to explain her new constellation of symptoms.    Return to clinic in 6 weeks.    Tamra Scruggsqvi  Pulmonary and Critical Care  [id number]          Subjective:    Patient ID: Ms. Amin is a 49 y.o.female with a past medical history significant for anxiety, arthritis, questionable asthma versus COPD, diabetes, hypertension and a prior history of SVT presents with a follow-up visit for her pulmonary complaints.  She follows with me fairly closely for her moderate-persistent, difficult to control asthma symptoms.   She describes no change since her last visit. Still having cough, SOB, wheeze, and chest tightness. Cough varies and it both productive and dry at times. Symptoms do not change throughout the day. She wakes at every night  coughing.  Of note, she ran out of Dupixent, and this has been re-ordered by Dr. Marie (has not received it yet). She has been off of this for 2-3 weeks and has missed 2 doses.   She continues to experience chest tightness, wheezing, nighttime awakenings and shortness of breath. She was initiated on prednisone by Dr. Marie and states that her symptoms have improved since this.      ACT Total Scores 4/14/2022 7/14/2022 8/26/2022   ACT TOTAL SCORE - - -   ASTHMA ER VISITS - - -   ASTHMA HOSPITALIZATIONS - - -   ACT TOTAL SCORE (Goal Greater than or Equal to 20) 12 7 12   In the past 12 months, how many times did you visit the emergency room for your asthma without being admitted to the hospital? 0 2 1   In the past 12 months, how many times were you hospitalized overnight because of your asthma? 0 2 0         Pertinent past medical history:  48-year-old female here for follow-up.  Her breathing is a bit worse than 6 months ago.  She had multiple ER visits and evaluations.  This includes negative PE study.  She had a cath with a 75% LAD lesion which was intervened upon.  Since her catheterization she feels she has more heartburn.  Her breathing is worse with exertion.  Improves with rest.  It is also worse with cold weather.  Warm weather and humid air does not bother.  Symptoms localized to the chest.  Pertinent positives include burning sensation in the chest.  Pertinent negatives include no fever or hemoptysis.    Current Outpatient Medications   Medication Sig Dispense Refill     acetaminophen (TYLENOL) 500 MG tablet TAKE 1 PILL BY MOUTH EVERY 6 HOURS AS NEEDED FOR PAIN 100 tablet 10     albuterol (PROAIR HFA/PROVENTIL HFA/VENTOLIN HFA) 108 (90 Base) MCG/ACT inhaler Inhale 2 puffs into the lungs every 4 hours as needed for shortness of breath / dyspnea 4 g 11     albuterol (PROVENTIL) (2.5 MG/3ML) 0.083% neb solution Take 1 vial (2.5 mg) by nebulization every 6 hours as needed 90 mL 11     ALLERGY RELIEF 10 MG tablet  TAKE 1 TABLET (10 MG TOTAL) BY MOUTH DAILY FOR ALLERGIES 30 tablet 3     amitriptyline (ELAVIL) 10 MG tablet TAKE 2 TABLETS (20 MG TOTAL) BY MOUTH AT BEDTIME. 180 tablet 3     ASPIRIN LOW DOSE 81 MG EC tablet TAKE 1 TABLET (81 MG TOTAL) BY MOUTH DAILY. 90 tablet 3     atorvastatin (LIPITOR) 80 MG tablet TAKE 1 TABLET (80 MG TOTAL) BY MOUTH AT BEDTIME FOR CHOLESTEROL 90 tablet 3     B-D U/F 31G X 8 MM insulin pen needle USE ONE PEN NEEDLE DAILY AS NEEDED FOR  each 1     Continuous Blood Gluc  (FREESTYLE MONICA 14 DAY READER) MICHAEL Uses daily       Continuous Blood Gluc Sensor (FREESTYLE MONICA 14 DAY SENSOR) MISC USE 1 UNITS AS DIRECTED EVERY 14 (FOURTEEN) DAYS. 2 each 3     CONTOUR NEXT TEST test strip Pt checking blood sugar 2x per day       cyclobenzaprine (FLEXERIL) 5 MG tablet TAKE 1-2 TABLETS (5-10 MG) BY MOUTH 3 TIMES DAILY AS NEEDED FOR MUSCLE SPASMS 30 tablet 3     dextromethorphan (DELSYM) 30 MG/5ML liquid TAKE 10 MLS (60 MG) BY MOUTH 2 TIMES DAILY AS NEEDED FOR COUGH 178 mL 0     diclofenac (VOLTAREN) 1 % topical gel Apply 4 g topically 4 times daily 350 g 3     dupilumab (DUPIXENT) 300 MG/2ML prefilled pen Inject 2 mLs (300 mg) Subcutaneous every 14 days 2 mL 3     famotidine (PEPCID) 20 MG tablet Take 1 tablet (20 mg) by mouth 2 times daily 90 tablet 3     fluticasone-vilanterol (BREO ELLIPTA) 200-25 MCG/INH inhaler Inhale 1 puff into the lungs daily 60 each 11     gabapentin (NEURONTIN) 300 MG capsule Take 2 capsules (600 mg) by mouth 3 times daily 180 capsule 3     Global Inject Ease Lancets 30G MISC USE 1 EACH AS DIRECTED 3 (THREE) TIMES A DAY. DISPENSE BRAND PER PATIENT'S INSURANCE AT PHARMACY DISCRETION.(E11.9) 100 each 3     guaiFENesin (ROBITUSSIN) 100 MG/5ML liquid Take 10 mLs (200 mg) by mouth every 4 hours as needed for cough 200 mL 1     hydrochlorothiazide (MICROZIDE) 12.5 MG capsule TAKE 1 CAPSULE (12.5 MG TOTAL) BY MOUTH DAILY FOR BLOOD PRESSURE 90 capsule 3     hydrocortisone  (CORTAID) 1 % external cream Apply topically 2 times daily 60 g 0     insulin aspart (NOVOLOG PEN) 100 UNIT/ML pen 10 U BID before meals 15 mL 4     Insulin Aspart FlexPen 100 UNIT/ML SOPN GIVE BEFORE MEALS: FOR PRE-MEAL GLUCOSE: 140-189 GIVE 1 UNIT, 190-239 GIVE 2 UNITS, 240-289 GIVE 3 UNITS, 290-339 GIVE 4 UNITS, =OR>340 GIVE 6 UNITS *84* 15 mL 0     insulin glargine (LANTUS PEN) 100 UNIT/ML pen Inject 10 Units Subcutaneous every morning AND 24 Units At Bedtime. 15 mL 3     insulin pen needle (32G X 4 MM) 32G X 4 MM miscellaneous Use one pen needles daily or as directed. 200 each 11     ipratropium - albuterol 0.5 mg/2.5 mg/3 mL (DUONEB) 0.5-2.5 (3) MG/3ML neb solution Take 1 vial (3 mLs) by nebulization every 6 hours as needed for shortness of breath / dyspnea or wheezing 360 mL 11     lidocaine (XYLOCAINE) 5 % external ointment Apply topically 3 times daily as needed Small amount (finger tip amount) to chest tid prn pain 35.44 g 0     LORazepam (ATIVAN) 0.5 MG tablet Take 1 tablet (0.5 mg) by mouth 2 times daily as needed for agitation or anxiety 30 tablet 0     meclizine (ANTIVERT) 25 MG tablet Take 25 mg by mouth daily as needed        metFORMIN (GLUCOPHAGE) 1000 MG tablet Take 1 tablet (1,000 mg) by mouth 2 times daily (with meals) 180 tablet 1     metoprolol tartrate (LOPRESSOR) 25 MG tablet TAKE 1 TABLET (25 MG TOTAL) BY MOUTH 2 (TWO) TIMES A DAY FOR BLOOD PRESSURE 180 tablet 3     montelukast (SINGULAIR) 10 MG tablet Take 1 tablet (10 mg) by mouth At Bedtime 30 tablet 6     omeprazole (PRILOSEC) 40 MG DR capsule Take 1 capsule (40 mg) by mouth daily 90 capsule 3     Polyethylene Glycol 3350 (PEG 3350) 17 GM/SCOOP POWD MIX 17 GRAMS WITH LIQUID AND DRINK ONCE DAILY FOR CONSTIPATION 510 g 12     polyvinyl alcohol (ARTIFICIAL TEARS) 1.4 % ophthalmic solution Place 1 drop into both eyes as needed for dry eyes 15 mL 3     predniSONE (DELTASONE) 20 MG tablet Take 1 tablet (20 mg) by mouth 2 times daily 10 tablet  0     sucralfate (CARAFATE) 1 GM tablet TAKE 1 TABLET (1 G TOTAL) BY MOUTH 4 (FOUR) TIMES A DAY BEFORE MEALS AND AT BEDTIME. TAKE 1 HOUR PRIOR TO MEALS 120 tablet 11     tiotropium (SPIRIVA RESPIMAT) 2.5 MCG/ACT inhalation aerosol Inhale 2 puffs into the lungs daily 4 g 1     Social history:  Lives in a house built in 1963; no concern for mold in the house.  7 People live in the house including 2 children.  There are no pets in the house.  She is a never smoker and there is no second hand exposure to smoke.  She does not drink alcoholic beverages and denies indulging in recreational drugs.    Physical Exam   /60 (BP Location: Left arm, Patient Position: Chair, Cuff Size: Adult Regular)   Pulse 98   Wt 53.7 kg (118 lb 6.4 oz)   SpO2 99%   BMI 22.77 kg/m    Physical Exam  Constitutional:       Appearance: Normal appearance.   HENT:      Head: Normocephalic.      Mouth/Throat:      Mouth: Mucous membranes are moist.   Cardiovascular:      Rate and Rhythm: Normal rate and regular rhythm.      Heart sounds: Normal heart sounds.   Pulmonary:      Effort: Pulmonary effort is normal.      Breath sounds: Normal breath sounds. No wheezing.   Abdominal:      General: Abdomen is flat.   Skin:     General: Skin is warm.   Neurological:      General: No focal deficit present.      Mental Status: She is alert.        Latest Reference Range & Units 02/07/22 15:47   Neutrophil Cytoplasmic Antibody <1:10  <1:10   Neutrophil Cytoplasmic Antibody Pattern  The ANCA IFA is <1:10.  No further testing will be performed.      Latest Reference Range & Units 02/07/22 15:47   Schistosoma Ab IgG Negative  Negative [1]   Strongyloides Bere IgG <=0.9 IV 0.4 [2]      Latest Reference Range & Units 02/07/22 15:47   Immunoglobulin E <=214 kU/L 74 [1]   Allergen Fungimold A Fumigatus IgE <=0.34 kU/L <0.10 [2]   Aspergillus Fumagatis 1 Antibody None Detected  None Detected [3]   Aspergillus Fumagatis 6 Antibody None Detected  None Detected  [4]   Allergen, Interp, Immunocap Score IgE  See Note [5]         Tamra Duong MD  Pulmonary and Critical Care  (P) [phone]

## 2022-08-26 NOTE — PATIENT INSTRUCTIONS
Please call and let us know if you feel better once the Dupixent has been started.  I will talk to Dr. Hirsch about whether or not I can switch from Metoprolol to some other medication for your blood pressure as medications like Metoprolol can sometimes make your breathing worse.

## 2022-09-03 ENCOUNTER — HEALTH MAINTENANCE LETTER (OUTPATIENT)
Age: 52
End: 2022-09-03

## 2022-09-05 NOTE — PATIENT INSTRUCTIONS
[facility]    1. Your sleep study will be reviewed by a sleep physician within the next few days.     2. Please follow up in the sleep clinic as scheduled, or, make an appointment with your sleep provider to be seen within two weeks to discuss the results of the sleep study.    3. If you have any questions or problems with your treatment plan, please contact your sleep clinic provider at [phone] to further manage your condition.    4. Please review your attached medication list, and, at your follow-up appointment advise your sleep clinic provider about any changes.    5. Go to [email or web address] for more information about your sleep problems.    Nii Brown, ISAIAH, RPSGT  April 2, 2022         Hospital chart

## 2022-09-06 ENCOUNTER — APPOINTMENT (OUTPATIENT)
Dept: MRI IMAGING | Facility: HOSPITAL | Age: 52
End: 2022-09-06
Payer: COMMERCIAL

## 2022-09-06 ENCOUNTER — HOSPITAL ENCOUNTER (EMERGENCY)
Facility: HOSPITAL | Age: 52
Discharge: HOME | End: 2022-09-06
Attending: EMERGENCY MEDICINE | Admitting: EMERGENCY MEDICINE
Payer: COMMERCIAL

## 2022-09-06 ENCOUNTER — APPOINTMENT (OUTPATIENT)
Dept: CT IMAGING | Facility: HOSPITAL | Age: 52
End: 2022-09-06
Payer: COMMERCIAL

## 2022-09-06 VITALS
SYSTOLIC BLOOD PRESSURE: 124 MMHG | BODY MASS INDEX: 22.69 KG/M2 | OXYGEN SATURATION: 98 % | TEMPERATURE: 98 F | WEIGHT: 118 LBS | DIASTOLIC BLOOD PRESSURE: 74 MMHG | HEART RATE: 69 BPM | RESPIRATION RATE: 16 BRPM

## 2022-09-06 DIAGNOSIS — R10.32 LLQ ABDOMINAL PAIN: ICD-10-CM

## 2022-09-06 DIAGNOSIS — R42 VERTIGO: ICD-10-CM

## 2022-09-06 LAB
ALBUMIN SERPL-MCNC: 3.6 G/DL (ref 3.5–5)
ALBUMIN UR-MCNC: 20 MG/DL
ALP SERPL-CCNC: 96 U/L (ref 45–120)
ALT SERPL W P-5'-P-CCNC: 21 U/L (ref 0–45)
ANION GAP SERPL CALCULATED.3IONS-SCNC: 9 MMOL/L (ref 5–18)
APPEARANCE UR: CLEAR
APTT PPP: 29 SECONDS (ref 22–38)
AST SERPL W P-5'-P-CCNC: 26 U/L (ref 0–40)
BACTERIA #/AREA URNS HPF: ABNORMAL /HPF
BASOPHILS # BLD AUTO: 0.1 10E3/UL (ref 0–0.2)
BASOPHILS NFR BLD AUTO: 1 %
BILIRUB SERPL-MCNC: 0.4 MG/DL (ref 0–1)
BILIRUB UR QL STRIP: NEGATIVE
BUN SERPL-MCNC: 12 MG/DL (ref 8–22)
CALCIUM SERPL-MCNC: 9.7 MG/DL (ref 8.5–10.5)
CHLORIDE BLD-SCNC: 107 MMOL/L (ref 98–107)
CO2 SERPL-SCNC: 22 MMOL/L (ref 22–31)
COLOR UR AUTO: YELLOW
CREAT SERPL-MCNC: 0.7 MG/DL (ref 0.6–1.1)
EOSINOPHIL # BLD AUTO: 1 10E3/UL (ref 0–0.7)
EOSINOPHIL NFR BLD AUTO: 12 %
ERYTHROCYTE [DISTWIDTH] IN BLOOD BY AUTOMATED COUNT: 15 % (ref 10–15)
GFR SERPL CREATININE-BSD FRML MDRD: >90 ML/MIN/1.73M2
GLUCOSE BLD-MCNC: 147 MG/DL (ref 70–125)
GLUCOSE UR STRIP-MCNC: NEGATIVE MG/DL
HCT VFR BLD AUTO: 37.1 % (ref 35–47)
HGB BLD-MCNC: 11.5 G/DL (ref 11.7–15.7)
HGB UR QL STRIP: NEGATIVE
IMM GRANULOCYTES # BLD: 0 10E3/UL
IMM GRANULOCYTES NFR BLD: 0 %
INR PPP: 1.02 (ref 0.85–1.15)
KETONES UR STRIP-MCNC: ABNORMAL MG/DL
LEUKOCYTE ESTERASE UR QL STRIP: ABNORMAL
LYMPHOCYTES # BLD AUTO: 2.4 10E3/UL (ref 0.8–5.3)
LYMPHOCYTES NFR BLD AUTO: 27 %
MCH RBC QN AUTO: 25.8 PG (ref 26.5–33)
MCHC RBC AUTO-ENTMCNC: 31 G/DL (ref 31.5–36.5)
MCV RBC AUTO: 83 FL (ref 78–100)
MONOCYTES # BLD AUTO: 0.6 10E3/UL (ref 0–1.3)
MONOCYTES NFR BLD AUTO: 6 %
MUCOUS THREADS #/AREA URNS LPF: PRESENT /LPF
NEUTROPHILS # BLD AUTO: 4.9 10E3/UL (ref 1.6–8.3)
NEUTROPHILS NFR BLD AUTO: 54 %
NITRATE UR QL: NEGATIVE
NRBC # BLD AUTO: 0 10E3/UL
NRBC BLD AUTO-RTO: 0 /100
PH UR STRIP: 6 [PH] (ref 5–7)
PLATELET # BLD AUTO: 236 10E3/UL (ref 150–450)
POTASSIUM BLD-SCNC: 4.1 MMOL/L (ref 3.5–5)
PROT SERPL-MCNC: 7 G/DL (ref 6–8)
RBC # BLD AUTO: 4.45 10E6/UL (ref 3.8–5.2)
RBC URINE: 2 /HPF
SODIUM SERPL-SCNC: 138 MMOL/L (ref 136–145)
SP GR UR STRIP: 1.03 (ref 1–1.03)
SQUAMOUS EPITHELIAL: 6 /HPF
TROPONIN I SERPL-MCNC: 0.02 NG/ML (ref 0–0.29)
UROBILINOGEN UR STRIP-MCNC: 2 MG/DL
WBC # BLD AUTO: 8.9 10E3/UL (ref 4–11)
WBC URINE: 10 /HPF

## 2022-09-06 PROCEDURE — 84484 ASSAY OF TROPONIN QUANT: CPT | Performed by: NURSE PRACTITIONER

## 2022-09-06 PROCEDURE — 250N000013 HC RX MED GY IP 250 OP 250 PS 637: Performed by: NURSE PRACTITIONER

## 2022-09-06 PROCEDURE — 70553 MRI BRAIN STEM W/O & W/DYE: CPT

## 2022-09-06 PROCEDURE — 93005 ELECTROCARDIOGRAM TRACING: CPT | Performed by: NURSE PRACTITIONER

## 2022-09-06 PROCEDURE — 99285 EMERGENCY DEPT VISIT HI MDM: CPT | Mod: 25

## 2022-09-06 PROCEDURE — 255N000002 HC RX 255 OP 636: Performed by: EMERGENCY MEDICINE

## 2022-09-06 PROCEDURE — 82310 ASSAY OF CALCIUM: CPT | Performed by: NURSE PRACTITIONER

## 2022-09-06 PROCEDURE — 87086 URINE CULTURE/COLONY COUNT: CPT | Performed by: NURSE PRACTITIONER

## 2022-09-06 PROCEDURE — 36415 COLL VENOUS BLD VENIPUNCTURE: CPT | Performed by: NURSE PRACTITIONER

## 2022-09-06 PROCEDURE — 96374 THER/PROPH/DIAG INJ IV PUSH: CPT

## 2022-09-06 PROCEDURE — 85610 PROTHROMBIN TIME: CPT | Performed by: NURSE PRACTITIONER

## 2022-09-06 PROCEDURE — 70549 MR ANGIOGRAPH NECK W/O&W/DYE: CPT

## 2022-09-06 PROCEDURE — 250N000011 HC RX IP 250 OP 636: Performed by: EMERGENCY MEDICINE

## 2022-09-06 PROCEDURE — 250N000011 HC RX IP 250 OP 636: Performed by: NURSE PRACTITIONER

## 2022-09-06 PROCEDURE — 81001 URINALYSIS AUTO W/SCOPE: CPT | Performed by: NURSE PRACTITIONER

## 2022-09-06 PROCEDURE — 96375 TX/PRO/DX INJ NEW DRUG ADDON: CPT

## 2022-09-06 PROCEDURE — 70544 MR ANGIOGRAPHY HEAD W/O DYE: CPT | Mod: XS

## 2022-09-06 PROCEDURE — 85025 COMPLETE CBC W/AUTO DIFF WBC: CPT | Performed by: NURSE PRACTITIONER

## 2022-09-06 PROCEDURE — 85730 THROMBOPLASTIN TIME PARTIAL: CPT | Performed by: NURSE PRACTITIONER

## 2022-09-06 PROCEDURE — 74176 CT ABD & PELVIS W/O CONTRAST: CPT

## 2022-09-06 PROCEDURE — A9585 GADOBUTROL INJECTION: HCPCS | Performed by: EMERGENCY MEDICINE

## 2022-09-06 RX ORDER — GADOBUTROL 604.72 MG/ML
5 INJECTION INTRAVENOUS ONCE
Status: COMPLETED | OUTPATIENT
Start: 2022-09-06 | End: 2022-09-06

## 2022-09-06 RX ORDER — MECLIZINE HCL 12.5 MG 12.5 MG/1
25 TABLET ORAL 3 TIMES DAILY PRN
Qty: 30 TABLET | Refills: 0 | Status: SHIPPED | OUTPATIENT
Start: 2022-09-06 | End: 2023-03-21

## 2022-09-06 RX ORDER — ONDANSETRON 2 MG/ML
4 INJECTION INTRAMUSCULAR; INTRAVENOUS ONCE
Status: COMPLETED | OUTPATIENT
Start: 2022-09-06 | End: 2022-09-06

## 2022-09-06 RX ORDER — MECLIZINE HCL 12.5 MG 12.5 MG/1
25 TABLET ORAL ONCE
Status: COMPLETED | OUTPATIENT
Start: 2022-09-06 | End: 2022-09-06

## 2022-09-06 RX ORDER — MORPHINE SULFATE 4 MG/ML
4 INJECTION, SOLUTION INTRAMUSCULAR; INTRAVENOUS ONCE
Status: COMPLETED | OUTPATIENT
Start: 2022-09-06 | End: 2022-09-06

## 2022-09-06 RX ADMIN — MECLIZINE 25 MG: 12.5 TABLET ORAL at 09:48

## 2022-09-06 RX ADMIN — MORPHINE SULFATE 4 MG: 4 INJECTION, SOLUTION INTRAMUSCULAR; INTRAVENOUS at 09:50

## 2022-09-06 RX ADMIN — GADOBUTROL 5 ML: 604.72 INJECTION INTRAVENOUS at 12:20

## 2022-09-06 RX ADMIN — ONDANSETRON 4 MG: 2 INJECTION INTRAMUSCULAR; INTRAVENOUS at 11:36

## 2022-09-06 ASSESSMENT — ENCOUNTER SYMPTOMS
VOMITING: 0
DIZZINESS: 1
COUGH: 1
FEVER: 0
SHORTNESS OF BREATH: 1
DIFFICULTY URINATING: 0
NAUSEA: 0
CHILLS: 0
DYSURIA: 0
ABDOMINAL PAIN: 1
HEMATURIA: 0

## 2022-09-06 ASSESSMENT — ACTIVITIES OF DAILY LIVING (ADL)
ADLS_ACUITY_SCORE: 35
ADLS_ACUITY_SCORE: 35

## 2022-09-06 NOTE — ED TRIAGE NOTES
Patient presents to ED for evaluation of dizziness. Began 2 days ago. History of similar, at that time found to have had a stroke. States did not have a ride to come in two days ago but ongoing since. Kazakh speaking but family speaks english.     Triage Assessment     Row Name 09/06/22 0903       Triage Assessment (Adult)    Airway WDL WDL       Respiratory WDL    Respiratory WDL WDL       Skin Circulation/Temperature WDL    Skin Circulation/Temperature WDL WDL       Cardiac WDL    Cardiac WDL WDL       Peripheral/Neurovascular WDL    Peripheral Neurovascular WDL WDL       Cognitive/Neuro/Behavioral WDL    Cognitive/Neuro/Behavioral WDL WDL

## 2022-09-06 NOTE — ED NOTES
Alert & oriented. Ambulatory to bathroom without difficulty. Steady on feet.  Reports abdominal discomfort. No nausea or vomiting.   Dry cough.   No chest pain or pressure.   Room spinning sensation for two days, worse with movement.     Jewelry removed for MRI. Given to daughter.

## 2022-09-06 NOTE — ED PROVIDER NOTES
EMERGENCY DEPARTMENT ENCOUNTER      NAME: Kulwant Amin  AGE: 52 year old female  YOB: 1970  MRN: [id number]  EVALUATION DATE & TIME: 2022  9:06 AM    PCP: Adrien Cardoza    ED PROVIDER: KACEY Raymundo CNP      Chief Complaint   Patient presents with     Dizziness         FINAL IMPRESSION:  1. Vertigo    2. LLQ abdominal pain          ED COURSE & MEDICAL DECISION MAKIN:15 AM I met with the patient, obtained history, performed an initial exam, and discussed options and plan for treatment here in the ED.  9:37 AM I staffed the patient with Dr. Brown.  1:21 PM I rechecked the patient and updated them on laboratory and imaging results. We discussed the plan for discharge and the patient is agreeable. Reviewed supportive cares, symptomatic treatment, outpatient follow up, and reasons to return to the Emergency Department. Patient to be discharged by ED RN.     Pertinent Labs & Imaging studies reviewed. (See chart for details)  52 year old female presents to the Emergency Department for evaluation of dizziness and lower abdominal pain. MRI / MRA of the head and neck negative for stroke or significant stenosis. Suspect peripheral vertigo. Better after meclizine. No clear etiology for lower abdominal pain. CT negative for any cause. UA appear contaminated and she denied any dysuria or frequency. Will culture urine. Rx for meclizine provided. Recommend that she follow up with her PCP within one week. Also given return precautions.     At the conclusion of the encounter I discussed the results of all of the tests and the disposition. The questions were answered. The patient or family acknowledged understanding and was agreeable with the care plan.       MEDICATIONS GIVEN IN THE EMERGENCY:  Medications   morphine (PF) injection 4 mg (4 mg Intravenous Given 22 9950)   meclizine (ANTIVERT) tablet 25 mg (25 mg Oral Given 22 2548)   ondansetron (ZOFRAN) injection 4 mg (4 mg Intravenous Given  9/6/22 1136)   gadobutrol (GADAVIST) injection 5 mL (5 mLs Intravenous Given 9/6/22 1220)       NEW PRESCRIPTIONS STARTED AT TODAY'S ER VISIT  New Prescriptions    No medications on file            =================================================================    HPI    Patient information was obtained from: Patient    Use of Intrepreter: Yes (In Person) - Language: Hebrew        Kulwant Amin is a 52 year old female with a history of CVA (arterial ischemic stroke - Jan 2021), latent TB, COPD, CAD, HLD, diabetes mellitus type II, and HTN who presents to the ED for evaluation of dizziness.    Per chart review, the patient was admitted to [facility] from 1/13/21 to 1/15/21 (2 days). The patient initially presented to the ED for evaluation of dizziness. While in the ED patient had MRI which showed acute right diomedes stroke. The patient was admitted for acute pontine CVA outside the timeframe for TPA. While admitted the patient had MRA of the head and neck which showed no significant stenosis. She also had an Echo which showed normal LV function. At time of discharge patient had the following medication changes: started meclizine as needed for dizziness and aspirin. Patient was discharged to home with plan for outpatient follow-up with PCP and neurology.    The patient reports she has had 2 days of room spinning dizziness. The dizziness comes and goes and is worse with movement. She also reports lower abdominal pain that comes and goes. The abdominal pain is not triggered by anything. She has no associated nausea, vomiting, or urinary symptoms. She tried taking Tylenol this morning, but did not have any relief. She also reports feeling short of breath, but reports it feels the same as her COPD/asthma. She also reports a chronic cough. She otherwise denies fever, chills, chest pain, and any other complaints at this time.      REVIEW OF SYSTEMS   Review of Systems   Constitutional: Negative for chills and fever.    Respiratory: Positive for cough (chronic) and shortness of breath.    Cardiovascular: Negative for chest pain.   Gastrointestinal: Positive for abdominal pain (lower). Negative for nausea and vomiting.   Genitourinary: Negative for difficulty urinating, dysuria and hematuria.   Neurological: Positive for dizziness.   All other systems reviewed and are negative.      PAST MEDICAL HISTORY:  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     Microcytic anemia 11/17/2019     S/P coronary artery stent placement 02/02/2018     TB lung, latent     9 mos INH       PAST SURGICAL HISTORY:  Past Surgical History:   Procedure Laterality Date     CORONARY STENT PLACEMENT  2018     CV CORONARY ANGIOGRAM N/A 1/25/2018    Procedure: Coronary Angiogram;  Surgeon: Angelo Serrano MD;  Location: [facility] Cath Lab;  Service:      CV CORONARY ANGIOGRAM N/A 5/5/2022    Procedure: Coronary Angiogram;  Surgeon: Irwin Cano MD;  Location: [facility] CATH LAB CV     CV CORONARY ANGIOGRAM  5/5/2022    Procedure: ;  Surgeon: Irwin Cano MD;  Location: [facility] CATH [facility] CV     AZ ESOPHAGOGASTRODUODENOSCOPY TRANSORAL DIAGNOSTIC N/A 12/10/2018    Procedure: ESOPHAGOGASTRODUODENOSCOPY (EGD);  Surgeon: Eddie Renteria MD;  Location: [facility] GI;  Service: Gastroenterology     AZ ESOPHAGOGASTRODUODENOSCOPY TRANSORAL DIAGNOSTIC N/A 12/3/2020    Procedure: ESOPHAGOGASTRODUODENOSCOPY (EGD) with biospies ;   Surgeon: Avi Crow MD;  Location: [facility];  Service: Gastroenterology     [facility] COLONOSCOPY W/WO BRUSH/WASH N/A 12/10/2018    Procedure: COLONOSCOPY with polypectomy using biopsy forceps;  Surgeon: Eddie Renteria MD;  Location: [facility];  Service: Gastroenterology           CURRENT MEDICATIONS:    No current facility-administered medications for this encounter.     Current Outpatient Medications   Medication     acetaminophen (TYLENOL) 500 MG tablet     albuterol (PROAIR HFA/PROVENTIL HFA/VENTOLIN HFA) 108 (90 Base) MCG/ACT inhaler     albuterol (PROVENTIL) (2.5 MG/3ML) 0.083% neb solution     ALLERGY RELIEF 10 MG tablet     amitriptyline (ELAVIL) 10 MG tablet     ASPIRIN LOW DOSE 81 MG EC tablet     atorvastatin (LIPITOR) 80 MG tablet     B-D U/F 31G X 8 MM insulin pen needle     Continuous Blood Gluc  (FREESTYLE MONICA 14 DAY READER) MICHAEL     Continuous Blood Gluc Sensor (FREESTYLE MONICA 14 DAY SENSOR) MISC     CONTOUR NEXT TEST test strip     cyclobenzaprine (FLEXERIL) 5 MG tablet     dextromethorphan (DELSYM) 30 MG/5ML liquid     diclofenac (VOLTAREN) 1 % topical gel     dupilumab (DUPIXENT) 300 MG/2ML prefilled pen     famotidine (PEPCID) 20 MG tablet     fluticasone-vilanterol (BREO ELLIPTA) 200-25 MCG/INH inhaler     gabapentin (NEURONTIN) 300 MG capsule     Global Inject Ease Lancets 30G MISC     guaiFENesin (ROBITUSSIN) 100 MG/5ML liquid     hydrochlorothiazide (MICROZIDE) 12.5 MG capsule     hydrocortisone (CORTAID) 1 % external cream     insulin aspart (NOVOLOG PEN) 100 UNIT/ML pen     Insulin Aspart FlexPen 100 UNIT/ML SOPN     insulin glargine (LANTUS PEN) 100 UNIT/ML pen     insulin pen needle (32G X 4 MM) 32G X 4 MM miscellaneous     ipratropium - albuterol 0.5 mg/2.5 mg/3 mL (DUONEB) 0.5-2.5 (3) MG/3ML neb solution     lidocaine (XYLOCAINE) 5 % external ointment     LORazepam (ATIVAN) 0.5 MG tablet     meclizine (ANTIVERT) 25 MG tablet     metFORMIN (GLUCOPHAGE) 1000 MG  tablet     metoprolol tartrate (LOPRESSOR) 25 MG tablet     montelukast (SINGULAIR) 10 MG tablet     omeprazole (PRILOSEC) 40 MG DR capsule     Polyethylene Glycol 3350 (PEG 3350) 17 GM/SCOOP POWD     polyvinyl alcohol (ARTIFICIAL TEARS) 1.4 % ophthalmic solution     predniSONE (DELTASONE) 20 MG tablet     sucralfate (CARAFATE) 1 GM tablet     tiotropium (SPIRIVA RESPIMAT) 2.5 MCG/ACT inhalation aerosol         ALLERGIES:  No Known Allergies    FAMILY HISTORY:  Family History   Problem Relation Age of Onset     Other - See Comments Mother          of an intestinal problem     Ulcers Father          of gastritis     Breast Cancer No family hx of        SOCIAL HISTORY:   Social History     Socioeconomic History     Marital status:      Spouse name: None     Number of children: None     Years of education: None     Highest education level: None   Tobacco Use     Smoking status: Never Smoker     Smokeless tobacco: Never Used     Tobacco comment: No passive exposure   Substance and Sexual Activity     Alcohol use: No     Drug use: No     Sexual activity: Yes     Partners: Male   Social History Narrative    2017 The patient lives with her daughter-in-law (who is present), , son, and 2 grandchildren (total of 6 people). Immigrant.         VITALS:  Patient Vitals for the past 24 hrs:   BP Temp Temp src Pulse Resp SpO2 Weight   22 1315 124/74 -- -- 69 -- 98 % --   22 1300 132/76 -- -- 82 -- 99 % --   22 1245 133/67 -- -- 66 -- 98 % --   22 1230 123/79 -- -- 68 -- 98 % --   22 1000 117/82 -- -- 79 -- 99 % --   22 0930 116/80 -- -- 82 -- 97 % --   22 0915 118/75 -- -- 82 16 97 % --   22 0905 125/87 98  F (36.7  C) Temporal 94 16 98 % 53.5 kg (118 lb)       PHYSICAL EXAM    Constitutional:  Alert, no distress  EYES: Conjunctivae clear  HENT:  Atraumatic, normocephalic  Respiratory:  No respiratory distress, normal breath sounds  Cardiovascular:  Normal  rate, normal rhythm, no murmurs  GI:  Soft, nondistended, mild LLQ tenderness, no palpable masses, no rebound, no guarding   Musculoskeletal:  No edema.  No cyanosis.   Integument: Warm, Dry  Neurologic:  Alert & oriented x 3. CN 3-12 grossly intact. Subtle past pointing bilateral finger nose finger. Normal bilateral heel shin.              LAB:  All pertinent labs reviewed and interpreted.  Labs Ordered and Resulted from Time of ED Arrival to Time of ED Departure   COMPREHENSIVE METABOLIC PANEL - Abnormal       Result Value    Sodium 138      Potassium 4.1      Chloride 107      Carbon Dioxide (CO2) 22      Anion Gap 9      Urea Nitrogen 12      Creatinine 0.70      Calcium 9.7      Glucose 147 (*)     Alkaline Phosphatase 96      AST 26      ALT 21      Protein Total 7.0      Albumin 3.6      Bilirubin Total 0.4      GFR Estimate >90     CBC WITH PLATELETS AND DIFFERENTIAL - Abnormal    WBC Count 8.9      RBC Count 4.45      Hemoglobin 11.5 (*)     Hematocrit 37.1      MCV 83      MCH 25.8 (*)     MCHC 31.0 (*)     RDW 15.0      Platelet Count 236      % Neutrophils 54      % Lymphocytes 27      % Monocytes 6      % Eosinophils 12      % Basophils 1      % Immature Granulocytes 0      NRBCs per 100 WBC 0      Absolute Neutrophils 4.9      Absolute Lymphocytes 2.4      Absolute Monocytes 0.6      Absolute Eosinophils 1.0 (*)     Absolute Basophils 0.1      Absolute Immature Granulocytes 0.0      Absolute NRBCs 0.0     ROUTINE UA WITH MICROSCOPIC REFLEX TO CULTURE - Abnormal    Color Urine Yellow      Appearance Urine Clear      Glucose Urine Negative      Bilirubin Urine Negative      Ketones Urine Trace (*)     Specific Gravity Urine 1.030      Blood Urine Negative      pH Urine 6.0      Protein Albumin Urine 20  (*)     Urobilinogen Urine 2.0 (*)     Nitrite Urine Negative      Leukocyte Esterase Urine 500 Shannan/uL (*)     Bacteria Urine Few (*)     Mucus Urine Present (*)     RBC Urine 2      WBC Urine 10 (*)      Squamous Epithelials Urine 6 (*)    TROPONIN I - Normal    Troponin I 0.02     INR - Normal    INR 1.02     PARTIAL THROMBOPLASTIN TIME - Normal    aPTT 29     URINE CULTURE         RADIOLOGY:  Reviewed all pertinent imaging. Please see official radiology report.  MRA Neck (Carotids) wo & w Contrast   Final Result   IMPRESSION:   1.  Normal neck MRA.      MRA Brain ([place]) wo Contrast   Final Result   IMPRESSION:   1.  Normal MRA [place].      MR Brain w/o & w Contrast   Final Result   IMPRESSION:   1.  No acute intracranial abnormalities identified.   2.  Minor chronic small vessel ischemic change, otherwise unremarkable contrast-enhanced MRI of the brain.      CT Abdomen Pelvis w/o Contrast   Final Result   IMPRESSION:    1.  No bowel obstruction, appendicitis, diverticulitis, abscess, hydronephrosis or renal or ureteral stone or radiopaque gallstone.   2.  No abdominal aortic aneurysm or ovarian cyst.   3.  The cause of the patient's symptoms is not evident on this examination.           EKG Interpretation  9/6/2022 at 9:23 AM    Rhythm: normal sinus  Rate: 92 BPM  Axis: normal  Ectopy: none  Conduction: normal  ST Segments: no acute change  T Waves: no acute change  Q Waves: none    Clinical Impression: no acute changes and normal EKG    I have independentlyreviewed and interpreted the patient's EKG with comments made as listed above.  Please see scanned image for full interpretation        PROCEDURES:   None      I, Marques Gramajo, am serving as a scribe to document services personally performed by Sunny Navarro CNP. based on my observation and the provider's statements to me. ISunny CNP attest that Marques Gramajo is acting in a scribe capacity, has observed my performance of the services and has documented them in accordance with my direction.    KACEY Raymundo, CNP  Emergency Medicine  [facility] EMERGENCY  DEPARTMENT  [address] [place] [id number]  [phone]  Dept: [phone]         Sunny Navarro, APRN CNP  09/06/22 1535

## 2022-09-06 NOTE — DISCHARGE INSTRUCTIONS
Your brain MRI did not show a stroke.    Your CT scan did not show any cause for your abdominal pain    Take meclizine up to 3 times daily for dizziness    Continue taking tylenol for discomfort. Read and follow label directions    Follow up with your doctor within one week    Return to the ER if your symptoms worsen.

## 2022-09-06 NOTE — ED NOTES
52 year old female with a history of CVA (arterial ischemic stroke - Jan 2021) and COPD, who presents to the Emergency Department for evaluation of two days of room spinning dizziness that comes and goes.  Neuro: Oriented x4, dizziness worse with movement  IV/drain: PIV right arm  VSS: VSS on RA  Resp: Clear    Cardio: WDL.   GI/: Voiding adequately , had emesis in MRI, zofran helpful  Pain/Discomfort:  abd discomfort on/off

## 2022-09-06 NOTE — ED PROVIDER NOTES
Emergency Department Staff Physician Note     I had a face to face encounter with this patient seen by the Advanced Practice Provider (JANNA).  I have seen, examined, and discussed the patient with the JANNA and agree with their assessment and plan of management.    Relevant HPI:     uKlwant Amin is a 52 year old female with a history of CVA (arterial ischemic stroke - Jan 2021) and COPD, who presents to the Emergency Department for evaluation of two days of room spinning dizziness that comes and goes. Worse with movement. She reports lower abdominal pain that comes and goes, not triggered by anything. No nausea, vomiting or urinary symptoms. Has taken tylenol for the pain without relief. She also reports feeling shortness of breath, but notes history of COPD and asthma. Endorses a chronic cough.     I, Lita Marshall, am serving as a scribe to document services personally performed by Dr. Brown, based on my observations and the provider's statements to me.   I, Dr. Brown, attest that Lita Marshall was acting in a scribe capacity, has observed my performance of the services and has documented them in accordance with my direction.    ED Course:  9:40 AM I received the patient report from the JANNA, Sunny Navarro CNP. I agree with their assessment and plan of management, and I will see the patient.  9:40 AM I met with the patient to introduce myself, gather additional history, perform my initial exam, and discuss the plan.   11:25 AM Patient vomiting in MRI.  1:35 PM Patient ready for discharge.    Brief Physical Exam:  VITAL SIGNS: /74   Pulse 69   Temp 98  F (36.7  C) (Temporal)   Resp 16   Wt 53.5 kg (118 lb)   SpO2 98%   BMI 22.69 kg/m      General Appearance: Well-appearing, well-nourished, no acute distress   Head:  Normocephalic, without obvious abnormality, atraumatic  Eyes:  PERRL, conjunctiva/corneas clear, EOM's intact,  ENT:  Lips, mucosa, and tongue normal, membranes are moist without pallor  Neck:   Normal ROM, symmetrical, trachea midline    Chest:  No tenderness or deformity, no crepitus  Cardio:  Regular rate and rhythm, no murmur, rub or gallop, 2+ pulses symmetric in all extremities  Pulm:  Clear to auscultation, respirations unlabored,   Back:  ROM normal, no CVA tenderness, no spinal tenderness, no paraspinal tenderness  Abdomen:  Soft, no rebound or guarding. Lower abdominal tenderness bilaterally.  Musculoskeletal: Full ROM, no edema bilateral lower extremities, no cyanosis, good ROM of major joints  Integument:  Warm, Dry, No erythema, No rash.    Neurologic:  Patient is alert and oriented ×3.  Face is symmetric.  Speech is normal.  Visual fields are full.  Cranial nerves II through XII are grossly intact. Motor tone is 5/5 and equal in both upper and lower extremities.  Bilateral symmetric sensation grossly intact upper and lower.  Minimally positive finger-nose bilaterally. Neg heel-shin.  Reflexes are 2/4 throughout.   Psychiatric:  Affect normal, Judgment normal, Mood normal.         Impression / ED Plan:  I had a face to face encounter with this patient seen by the Advanced Practice Provider (JANNA).  I have seen, examined, and discussed the patient with the JANNA and agree with their assessment and plan of management. I personally saw the patient and performed a substantive portion of the visit including all aspects of the medical decision making.    Kulwant Amin is a 52 year old female presents to the ED for evaluation of room spinning dizziness.  Patient had mildly positive finger-nose bilaterally, otherwise had a normal neuro exam, however due to the severity of her symptoms, and the subtle finding on neuro exam, we did decide to perform an MRI.  Fortunately, MRI does not show any evidence of acute stroke, and symptoms appear to be more consistent with peripheral vertigo.  She did have some mild abdominal discomfort, but no significant findings on CT, without concern for infectious process or  surgical abdomen.  Therefore at this time I do believe we can safely discharge home with outpatient follow-up with primary care provider.  Return precautions were discussed.    1. Vertigo    2. LLQ abdominal pain        Chintan Brown DO  Staff Physician  [facility] Emergency Department       Chintan Brown,   09/06/22 2685

## 2022-09-07 LAB — BACTERIA UR CULT: NORMAL

## 2022-09-10 LAB
ATRIAL RATE - MUSE: 92 BPM
DIASTOLIC BLOOD PRESSURE - MUSE: 75 MMHG
INTERPRETATION ECG - MUSE: NORMAL
P AXIS - MUSE: 50 DEGREES
PR INTERVAL - MUSE: 138 MS
QRS DURATION - MUSE: 84 MS
QT - MUSE: 380 MS
QTC - MUSE: 469 MS
R AXIS - MUSE: 36 DEGREES
SYSTOLIC BLOOD PRESSURE - MUSE: 118 MMHG
T AXIS - MUSE: 59 DEGREES
VENTRICULAR RATE- MUSE: 92 BPM

## 2022-09-12 ENCOUNTER — TELEPHONE (OUTPATIENT)
Dept: PULMONOLOGY | Facility: OTHER | Age: 52
End: 2022-09-12

## 2022-09-12 DIAGNOSIS — J45.901 ASTHMA EXACERBATION: Primary | ICD-10-CM

## 2022-09-12 RX ORDER — PREDNISONE 20 MG/1
TABLET ORAL
Qty: 10 TABLET | Refills: 0 | Status: SHIPPED | OUTPATIENT
Start: 2022-09-12 | End: 2022-09-30

## 2022-09-12 NOTE — TELEPHONE ENCOUNTER
Phone call from patient's family asking for medicine for her cough.      Has been off her Dupixent.  Asked them to check in with Allergy regarding getting the Dupixent restarted.     Will send another round of prednisone for her cough.

## 2022-09-13 ENCOUNTER — TELEPHONE (OUTPATIENT)
Dept: ALLERGY | Facility: CLINIC | Age: 52
End: 2022-09-13

## 2022-09-13 NOTE — TELEPHONE ENCOUNTER
Name Of Person Who Called: Billy  relationship (daughter)    Reason for call: patient daughter call regarding serum haven't receive.      Best Phone Number To Call Back: Cell number on file:    Telephone Information:   Mobile [phone]       Okay To Leave A Detailed Voicemail? Yes

## 2022-09-14 ENCOUNTER — TELEPHONE (OUTPATIENT)
Dept: FAMILY MEDICINE | Facility: CLINIC | Age: 52
End: 2022-09-14

## 2022-09-14 NOTE — TELEPHONE ENCOUNTER
Reason for Call:  Appointment Request    Patient requesting this type of appt:  Hospital/ED Follow-Up     Requested provider: Adrien Cardoza    Reason patient unable to be scheduled: Not within requested timeframe    When does patient want to be seen/preferred time: 3-7 days    Comments: Patient was at [facility] ER on 9/8 for dizziness and shortness of breath. Patient needs to been seen this week or next week. No openings with any providers. Can you fit her in? Please advise.    Could we send this information to you in [place] or would you prefer to receive a phone call?:   Patient would prefer a phone call   Okay to leave a detailed message?: Yes at Cell number on file:    Telephone Information:   Mobile [phone]       Call taken on 9/14/2022 at 8:11 AM by Rohit Newell

## 2022-09-15 NOTE — TELEPHONE ENCOUNTER
Please schedule appt with me first available hospital follow up slot or with any available provider.    Dr. Adrien Cardoza  9/15/2022 7:17 AM

## 2022-09-15 NOTE — TELEPHONE ENCOUNTER
,  Please call patient to assist with scheduling a follow-up per provider recommendation below.    Thank you kindly,    Eric

## 2022-09-18 DIAGNOSIS — Z91.09 ENVIRONMENTAL ALLERGIES: ICD-10-CM

## 2022-09-19 ENCOUNTER — OFFICE VISIT (OUTPATIENT)
Dept: CARDIOLOGY | Facility: CLINIC | Age: 52
End: 2022-09-19
Attending: INTERNAL MEDICINE
Payer: COMMERCIAL

## 2022-09-19 VITALS
WEIGHT: 122 LBS | HEIGHT: 60 IN | DIASTOLIC BLOOD PRESSURE: 76 MMHG | BODY MASS INDEX: 23.95 KG/M2 | RESPIRATION RATE: 24 BRPM | HEART RATE: 108 BPM | SYSTOLIC BLOOD PRESSURE: 116 MMHG

## 2022-09-19 DIAGNOSIS — E78.5 DYSLIPIDEMIA: ICD-10-CM

## 2022-09-19 DIAGNOSIS — I47.10 SVT (SUPRAVENTRICULAR TACHYCARDIA) (H): Primary | ICD-10-CM

## 2022-09-19 DIAGNOSIS — I25.10 CORONARY ARTERY DISEASE DUE TO LIPID RICH PLAQUE: ICD-10-CM

## 2022-09-19 DIAGNOSIS — I25.83 CORONARY ARTERY DISEASE DUE TO LIPID RICH PLAQUE: ICD-10-CM

## 2022-09-19 DIAGNOSIS — I25.10 CORONARY ARTERY DISEASE INVOLVING NATIVE CORONARY ARTERY WITHOUT ANGINA PECTORIS, UNSPECIFIED WHETHER NATIVE OR TRANSPLANTED HEART: ICD-10-CM

## 2022-09-19 PROCEDURE — 99214 OFFICE O/P EST MOD 30 MIN: CPT | Performed by: INTERNAL MEDICINE

## 2022-09-19 RX ORDER — LORATADINE 10 MG/1
TABLET ORAL
Qty: 30 TABLET | Refills: 3 | Status: SHIPPED | OUTPATIENT
Start: 2022-09-19 | End: 2023-01-03

## 2022-09-19 NOTE — LETTER
9/19/2022    Adrien Cardoza MD  1985 formerly [facility] [address]  [place] [address]    RE: Kulwant Amin       Dear Colleague,     I had the pleasure of seeing Kulwant Amin in the [facility].         [facility]   [address], [place] [address]   [email or web address]   OFFICE: [phone]          Thank you Adrien Jensen for asking the [facility] team to participate in the care of your patient, Kulwant Amin.     Impression and Plan     1. Coronary artery disease. Kulwant has known coronary artery disease. Specifically, patient underwent coronary angiography with PCI with stent placement to proximal-mid left anterior descending coronary artery (2.75×32 and 4.0×8 mm Synergy drug-eluting stents).  ?  Hariunderwent nuclear perfusion imaging 1 March 2018 at [facility] which revealed no evidence of infarct or ischemia.  ?  Repeat ischemic work-up involving a regadenoson MRI 24 July 2019 return favorable and revealed no evidence of ischemia (see Cardiac Diagnostic section below).    She underwent repeat coronary angiography 5 May 2022 that revealed no significant obstructive coronary disease and widely patent stent in LAD.     On interview today, Kulwant reports no chest pain symptoms reminiscent of her angina though she continues to have atypical chest discomfort..     2. SVT. Patient at time of initial consultation by me 27 December 2017 had been reported to have an SVT with heart rate of approximately 200-220 bpm. Patient apparently responded to adenosine. 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 Differential, however, would include Gustavo-Parkinson-White with concealed accessory pathway (no delta waves seen on ECG) with orthodromic reciprocating tachycardia (ORT).    As noted below, she does report some recurrent subjective rapid heart rates 1-2 average weekly.  Obed:    2-week  one-patch monitor.    4. Hypertension. Blood pressure is reasonable in the office today at 116/76 mmHg.  ?  5. Dyslipidemia.  Lipid profile 25 March 2022 was favorable with LDL 54 mg/dL and HDL 52 mg/dL.     Follow-up and further recommendations pending ambulatory monitor results.      35 minutes spent reviewing prior records (including documentation, laboratory studies, cardiac testing/imaging), interview with patient along with physical exam, planning, and subsequent documentation/crafting of note).           History of Present Illness    Once again I would like to thank you again for asking me to participate in the care of your patient, Kulwant Amin.  As you know, but to reiterate for my own records, Kulwant Amin is a 52 year old female with known coronary artery disease. Specifically, patient underwent coronary angiography 25 January 2018 with PCI with stent placement to proximal-mid left anterior descending coronary artery (2.75×32 and 4.0×8 mm Synergy drug-eluting stents).  ?  Kulwant underwent a nuclear perfusion study 22 January 2019 which was favorable and revealed no evidence of infarct or ischemia with normal ejection fraction of 70%.     Kulwant was seen by my colleague, Dr. Linh Velasquez on 15 August 2020.  She at that time was felt to have probable pericarditis and was started on anti-inflammatory therapy.    She underwent repeat coronary angiography 5 May 2022 that revealed no significant obstructive coronary disease and widely patent stent in LAD.     On interview today, Kulwant does report some intermittent subjective rapid heart rate on average 1-2 times per week.  She continues to report intermittent discomfort in the chest though unchanged in nature from her usual symptoms.  This is not exertionally related and is unpredictable.  She states her breathing is at/near baseline.    Further review of systems is otherwise negative/noncontributory (medical record and 13 point review of systems reviewed  "as well and pertinent positives noted).         Cardiac Diagnostics      Echocardiogram 5 May 2022:  1. Normal left ventricular size and systolic performance with ejection fraction of 55 to 60%.  2. \"Borderline\" anterior, septal, and apical hypokinesis.  3. Normal right ventricular size and systolic performance.  4. Normal atrial dimensions.     Echocardiogram 14 January 2021:  1. Normal left ventricular size and systolic performance with ejection fraction of 55 to 60%.  2. No significant valvular heart disease.  3. Normal right ventricular size and systolic performance.  4. Normal atrial dimensions.    When compared to prior echocardiogram dated 14 August 2020, no significant change.    Echocardiogram 14 August 2020 (personally reviewed):  1. Normal left ventricular size and systolic performance with a visually estimated ejection fraction of 55-60%.   2. No significant valvular heart disease is identified on this study.   3. Normal right ventricular size and systolic performance.     Echocardiogram 23 May 2019:  1. Normal left ventricular size and systolic performance with ejection fraction of 65 to 70%.  2. No significant valvular heart disease.  3. Normal right ventricular size and systolic performance.  4. Normal atrial dimensions.    Echocardiogram 25 January 2018:  1. Limited echocardiogram.  2. Normal left ventricular size and systolic performance with ejection fraction of 60%.  3. No pericardial effusion.    Echocardiogram 27 December 2017:  1. Normal left ventricular size and systolic performance with ejection fraction 65-70%.  2. No significant valvular heart disease.  3. Normal right ventricular size and systolic performance  4. Normal atrial dimensions.    Echocardiogram 7 July 2014:  1. Normal left ventricular size and systolic performance with ejection fraction of 55-60%.  2. No significant valvular heart disease.  3. Normal right ventricular size and systolic performance.  4. Normal atrial " dimensions.    Regadenoson MRI 24 July 2019:  1. Regadenoson stress cardiac MRI is negative for inducible myocardial ischemia.   2. Regadenoson stress ECG is negative for inducible myocardial ischemia.  3. No previous myocardial infarction is identified.  4. Small left ventricular size, wall thickness and function. The calculated left ventricular ejection fraction is 69%.  5. Normal right ventricular size and function.  6. No obvious valvular disease.    Nuclear perfusion imaging study 22 January 2019:  1. No evidence of infarct or ischemia.  2. Normal left ventricular systolic performance with ejection fraction of 70%.    Nuclear perfusion imaging 1 March 2018:  1. No evidence of infarct or ischemia.  2. Normal left ventricular systolic performance with ejection fraction greater than 75%.    Nuclear perfusion imaging 28 December 2017 (pre-coronary angiogram):  1. No evidence of infarct or ischemia.  2. Increased stress to rest E ratio 1.46 possibly representing multivessel disease.  3. Normal left ventricular systolic performance with ejection fraction of 75%.    Coronary angiogram 5 May 2022:  1. Left main coronary artery: Normal.  2. Left anterior descending coronary artery: No significant obstructive disease with widely patent stent.  3. Circumflex coronary artery: Proximal 30% stenosis.  4. Right coronary artery: Dominant vessel with 25% distal stenosis.    Coronary angiogram 25 January 2018:  1. Left Main coronary artery: Normal.  2. Left anterior descending coronary artery: Proximal-mid 70% stenosis at diagonal bifurcation with fractional flow reserve of 0.80. 25 January 2018 with  3. Circumflex coronary artery: Mild disease.  4. Right coronary artery: Normal.  5. Status post PCI with stent placement to proximal-mid left anterior descending coronary artery (2.75×32 and 4.0×8 mm Synergy drug-eluting stents).    Holter monitor 13 August 2019:  1. Normal Holter monitor.    Holter monitor 22 January  2019:  1. Normal Holter.    30 day event recorder 20 March 2018 through 17 April 2018:  1. Normal 24-hour Holter monitor.  2. Symptoms correlating with sinus rhythm.     Twelve-lead ECG (personally reviewed) 27 December 2017: Sinus rhythm. ?Normal ECG.           Physical Examination       /76 (BP Location: Left arm, Patient Position: Sitting, Cuff Size: Adult Small)   Pulse 108   Resp 24   Ht 1.524 m (5')   Wt 55.3 kg (122 lb)   BMI 23.83 kg/m          Wt Readings from Last 3 Encounters:   09/19/22 55.3 kg (122 lb)   09/06/22 53.5 kg (118 lb)   08/26/22 53.7 kg (118 lb 6.4 oz)     The patient is alert and oriented times three. Sclerae are anicteric. Mucosal membranes are moist. Jugular venous pressure is normal. No significant adenopathy/thyromegally appreciated. Lungs are clear with good expansion. On cardiovascular exam, the patient has a regular S1 and S2. Abdomen is soft and non-tender. Extremities reveal no clubbing, cyanosis, or edema.         Medications  Allergies   Current Outpatient Medications   Medication Sig Dispense Refill     acetaminophen (TYLENOL) 500 MG tablet TAKE 1 PILL BY MOUTH EVERY 6 HOURS AS NEEDED FOR PAIN 100 tablet 10     albuterol (PROAIR HFA/PROVENTIL HFA/VENTOLIN HFA) 108 (90 Base) MCG/ACT inhaler Inhale 2 puffs into the lungs every 4 hours as needed for shortness of breath / dyspnea 4 g 11     albuterol (PROVENTIL) (2.5 MG/3ML) 0.083% neb solution Take 1 vial (2.5 mg) by nebulization every 6 hours as needed 90 mL 11     ALLERGY RELIEF 10 MG tablet TAKE 1 TABLET (10 MG TOTAL) BY MOUTH DAILY FOR ALLERGIES 30 tablet 3     amitriptyline (ELAVIL) 10 MG tablet TAKE 2 TABLETS (20 MG TOTAL) BY MOUTH AT BEDTIME. 180 tablet 3     ASPIRIN LOW DOSE 81 MG EC tablet TAKE 1 TABLET (81 MG TOTAL) BY MOUTH DAILY. 90 tablet 3     atorvastatin (LIPITOR) 80 MG tablet TAKE 1 TABLET (80 MG TOTAL) BY MOUTH AT BEDTIME FOR CHOLESTEROL 90 tablet 3     B-D U/F 31G X 8 MM insulin pen needle USE ONE PEN  NEEDLE DAILY AS NEEDED FOR  each 1     Continuous Blood Gluc  (FREESTYLE MONICA 14 DAY READER) MICHAEL Uses daily       Continuous Blood Gluc Sensor (FREESTYLE MONICA 14 DAY SENSOR) MISC USE 1 UNITS AS DIRECTED EVERY 14 (FOURTEEN) DAYS. 2 each 3     CONTOUR NEXT TEST test strip Pt checking blood sugar 2x per day       cyclobenzaprine (FLEXERIL) 5 MG tablet TAKE 1-2 TABLETS (5-10 MG) BY MOUTH 3 TIMES DAILY AS NEEDED FOR MUSCLE SPASMS 30 tablet 3     dextromethorphan (DELSYM) 30 MG/5ML liquid TAKE 10 MLS (60 MG) BY MOUTH 2 TIMES DAILY AS NEEDED FOR COUGH 178 mL 0     diclofenac (VOLTAREN) 1 % topical gel Apply 4 g topically 4 times daily 350 g 3     dupilumab (DUPIXENT) 300 MG/2ML prefilled pen Inject 2 mLs (300 mg) Subcutaneous every 14 days 2 mL 3     famotidine (PEPCID) 20 MG tablet Take 1 tablet (20 mg) by mouth 2 times daily 90 tablet 3     fluticasone-vilanterol (BREO ELLIPTA) 200-25 MCG/INH inhaler Inhale 1 puff into the lungs daily 60 each 11     gabapentin (NEURONTIN) 300 MG capsule Take 2 capsules (600 mg) by mouth 3 times daily 180 capsule 3     Global Inject Ease Lancets 30G MISC USE 1 EACH AS DIRECTED 3 (THREE) TIMES A DAY. DISPENSE BRAND PER PATIENT'S INSURANCE AT PHARMACY DISCRETION.(E11.9) 100 each 3     guaiFENesin (ROBITUSSIN) 100 MG/5ML liquid Take 10 mLs (200 mg) by mouth every 4 hours as needed for cough 200 mL 1     hydrochlorothiazide (MICROZIDE) 12.5 MG capsule TAKE 1 CAPSULE (12.5 MG TOTAL) BY MOUTH DAILY FOR BLOOD PRESSURE 90 capsule 3     hydrocortisone (CORTAID) 1 % external cream Apply topically 2 times daily 60 g 0     insulin aspart (NOVOLOG PEN) 100 UNIT/ML pen 10 U BID before meals 15 mL 4     Insulin Aspart FlexPen 100 UNIT/ML SOPN GIVE BEFORE MEALS: FOR PRE-MEAL GLUCOSE: 140-189 GIVE 1 UNIT, 190-239 GIVE 2 UNITS, 240-289 GIVE 3 UNITS, 290-339 GIVE 4 UNITS, =OR>340 GIVE 6 UNITS *84* 15 mL 0     insulin glargine (LANTUS PEN) 100 UNIT/ML pen Inject 10 Units Subcutaneous every  morning AND 24 Units At Bedtime. 15 mL 3     insulin pen needle (32G X 4 MM) 32G X 4 MM miscellaneous Use one pen needles daily or as directed. 200 each 11     ipratropium - albuterol 0.5 mg/2.5 mg/3 mL (DUONEB) 0.5-2.5 (3) MG/3ML neb solution Take 1 vial (3 mLs) by nebulization every 6 hours as needed for shortness of breath / dyspnea or wheezing 360 mL 11     lidocaine (XYLOCAINE) 5 % external ointment Apply topically 3 times daily as needed Small amount (finger tip amount) to chest tid prn pain 35.44 g 0     LORazepam (ATIVAN) 0.5 MG tablet Take 1 tablet (0.5 mg) by mouth 2 times daily as needed for agitation or anxiety 30 tablet 0     meclizine (ANTIVERT) 12.5 MG tablet Take 2 tablets (25 mg) by mouth 3 times daily as needed for dizziness 30 tablet 0     metFORMIN (GLUCOPHAGE) 1000 MG tablet Take 1 tablet (1,000 mg) by mouth 2 times daily (with meals) 180 tablet 1     metoprolol tartrate (LOPRESSOR) 25 MG tablet TAKE 1 TABLET (25 MG TOTAL) BY MOUTH 2 (TWO) TIMES A DAY FOR BLOOD PRESSURE 180 tablet 3     montelukast (SINGULAIR) 10 MG tablet Take 1 tablet (10 mg) by mouth At Bedtime 30 tablet 6     omeprazole (PRILOSEC) 40 MG DR capsule Take 1 capsule (40 mg) by mouth daily 90 capsule 3     Polyethylene Glycol 3350 (PEG 3350) 17 GM/SCOOP POWD MIX 17 GRAMS WITH LIQUID AND DRINK ONCE DAILY FOR CONSTIPATION 510 g 12     polyvinyl alcohol (ARTIFICIAL TEARS) 1.4 % ophthalmic solution Place 1 drop into both eyes as needed for dry eyes 15 mL 3     predniSONE (DELTASONE) 20 MG tablet Take 2 tabs daily x 5 days 10 tablet 0     predniSONE (DELTASONE) 20 MG tablet Take 1 tablet (20 mg) by mouth 2 times daily 10 tablet 0     sucralfate (CARAFATE) 1 GM tablet TAKE 1 TABLET (1 G TOTAL) BY MOUTH 4 (FOUR) TIMES A DAY BEFORE MEALS AND AT BEDTIME. TAKE 1 HOUR PRIOR TO MEALS 120 tablet 11     tiotropium (SPIRIVA RESPIMAT) 2.5 MCG/ACT inhalation aerosol Inhale 2 puffs into the lungs daily 4 g 1     No Known Allergies       Lab  Results    Chemistry/lipid CBC Cardiac Enzymes/BNP/TSH/INR   Recent Labs   Lab Test 03/25/22  1200   CHOL 145   HDL 52   LDL 54   TRIG 197*     Recent Labs   Lab Test 03/25/22  1200 11/30/21  1307 05/06/21  1106   LDL 54 48 43     Recent Labs   Lab Test 09/06/22  0934      POTASSIUM 4.1   CHLORIDE 107   CO2 22   *   BUN 12   CR 0.70   GFRESTIMATED >90   FREDY 9.7     Recent Labs   Lab Test 09/06/22  0934 07/11/22 2018 07/02/22  1929   CR 0.70 0.73 0.72     Recent Labs   Lab Test 08/25/22  0852 03/25/22  1200 11/30/21  1111   A1C 10.2* 10.9* 8.0*          Recent Labs   Lab Test 09/06/22  0934   WBC 8.9   HGB 11.5*   HCT 37.1   MCV 83        Recent Labs   Lab Test 09/06/22  0934 07/11/22 2018 07/02/22  1929   HGB 11.5* 12.0 12.5    Recent Labs   Lab Test 09/06/22  0934 07/11/22  2340 07/11/22  2018   TROPONINI 0.02 <0.01 <0.01     Recent Labs   Lab Test 07/11/22 2018 07/02/22  1929 01/03/22  1628 05/23/18  0630 09/16/14  1639   BNP 15 12 <10   < >  --    NTBNP  --   --   --   --  26    < > = values in this interval not displayed.     Recent Labs   Lab Test 07/14/22  1138   TSH 0.59     Recent Labs   Lab Test 09/06/22  0934 07/02/22 1929 05/27/21  1107   INR 1.02 1.03 0.98        Medical History  Surgical History Family History Social History   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     Microcytic anemia 11/17/2019     S/P coronary artery stent placement 02/02/2018     TB  lung, latent     9 mos INH     Past Surgical History:   Procedure Laterality Date     CORONARY STENT PLACEMENT       CV CORONARY ANGIOGRAM N/A 2018    Procedure: Coronary Angiogram;  Surgeon: Angelo Serrano MD;  Location: [facility] Cath Lab;  Service:      CV CORONARY ANGIOGRAM N/A 2022    Procedure: Coronary Angiogram;  Surgeon: Irwin Cano MD;  Location: [facility] CATH LAB CV     CV CORONARY ANGIOGRAM  2022    Procedure: ;  Surgeon: Irwin Cano MD;  Location: [facility] CATH LAB CV     KS ESOPHAGOGASTRODUODENOSCOPY TRANSORAL DIAGNOSTIC N/A 12/10/2018    Procedure: ESOPHAGOGASTRODUODENOSCOPY (EGD);  Surgeon: Eddie Renteria MD;  Location: [facility];  Service: Gastroenterology     KS ESOPHAGOGASTRODUODENOSCOPY TRANSORAL DIAGNOSTIC N/A 12/3/2020    Procedure: ESOPHAGOGASTRODUODENOSCOPY (EGD) with biospies ;  Surgeon: Avi Crow MD;  Location: [facility];  Service: Gastroenterology     [facility] COLONOSCOPY W/WO BRUSH/WASH N/A 12/10/2018    Procedure: COLONOSCOPY with polypectomy using biopsy forceps;  Surgeon: Eddie Renteria MD;  Location: [facility];  Service: Gastroenterology     Family History   Problem Relation Age of Onset     Other - See Comments Mother          of an intestinal problem     Ulcers Father          of gastritis     Breast Cancer No family hx of         Social History     Socioeconomic History     Marital status:      Spouse name: Not on file     Number of children: Not on file     Years of education: Not on file     Highest education level: Not on file   Occupational History     Not on file   Tobacco Use     Smoking status: Never Smoker     Smokeless tobacco: Never Used     Tobacco comment: No passive exposure   Substance and Sexual Activity     Alcohol use: No     Drug use: No     Sexual activity: Yes     Partners: Male   Other Topics Concern     Parent/sibling w/ CABG, MI or angioplasty before 65F 55M? Not Asked   Social History  Narrative    12/22/2017 The patient lives with her daughter-in-law (who is present), , son, and 2 grandchildren (total of 6 people). [place].     Social Determinants of Health     Financial Resource Strain: Not on file   Food Insecurity: Not on file   Transportation Needs: Not on file   Physical Activity: Not on file   Stress: Not on file   Social Connections: Not on file   Intimate Partner Violence: Not on file   Housing Stability: Not on file                  Thank you for allowing me to participate in the care of your patient.      Sincerely,     Del Hirsch MD     [facility]  cc:   Ja Kramer MD  [address]  [place] [id number]

## 2022-09-19 NOTE — PROGRESS NOTES
[facility]   [address], [place] [address]   [email or web address]   OFFICE: [phone]          Thank you Adrien Jensen for asking the [facility] Heart Care team to participate in the care of your patient, Kulwant Amin.     Impression and Plan     1. Coronary artery disease. Kulwant has known coronary artery disease. Specifically, patient underwent coronary angiography with PCI with stent placement to proximal-mid left anterior descending coronary artery (2.75×32 and 4.0×8 mm Synergy drug-eluting stents).  ?  Hariunderwent nuclear perfusion imaging 1 March 2018 at [facility] which revealed no evidence of infarct or ischemia.  ?  Repeat ischemic work-up involving a regadenoson MRI 24 July 2019 return favorable and revealed no evidence of ischemia (see Cardiac Diagnostic section below).    She underwent repeat coronary angiography 5 May 2022 that revealed no significant obstructive coronary disease and widely patent stent in LAD.     On interview today, Kulwant reports no chest pain symptoms reminiscent of her angina though she continues to have atypical chest discomfort..     2. SVT. Patient at time of initial consultation by me 27 December 2017 had been reported to have an SVT with heart rate of approximately 200-220 bpm. Patient apparently responded to adenosine. 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 Differential, however, would include Gustavo-Parkinson-White with concealed accessory pathway (no delta waves seen on ECG) with orthodromic reciprocating tachycardia (ORT).    As noted below, she does report some recurrent subjective rapid heart rates 1-2 average weekly.  Obed:    2-week one-patch monitor.    4. Hypertension. Blood pressure is reasonable in the office today at 116/76 mmHg.  ?  5. Dyslipidemia.  Lipid profile 25 March 2022 was favorable with LDL 54 mg/dL and HDL 52 mg/dL.      Follow-up and further recommendations pending ambulatory monitor results.      35 minutes spent reviewing prior records (including documentation, laboratory studies, cardiac testing/imaging), interview with patient along with physical exam, planning, and subsequent documentation/crafting of note).           History of Present Illness    Once again I would like to thank you again for asking me to participate in the care of your patient, Kulwant Amin.  As you know, but to reiterate for my own records, Kulwant Amin is a 52 year old female with known coronary artery disease. Specifically, patient underwent coronary angiography 25 January 2018 with PCI with stent placement to proximal-mid left anterior descending coronary artery (2.75×32 and 4.0×8 mm Synergy drug-eluting stents).  ?  Kulwant underwent a nuclear perfusion study 22 January 2019 which was favorable and revealed no evidence of infarct or ischemia with normal ejection fraction of 70%.     Kulwant was seen by my colleague, Dr. Linh Velasquez on 15 August 2020.  She at that time was felt to have probable pericarditis and was started on anti-inflammatory therapy.    She underwent repeat coronary angiography 5 May 2022 that revealed no significant obstructive coronary disease and widely patent stent in LAD.     On interview today, Kulwant does report some intermittent subjective rapid heart rate on average 1-2 times per week.  She continues to report intermittent discomfort in the chest though unchanged in nature from her usual symptoms.  This is not exertionally related and is unpredictable.  She states her breathing is at/near baseline.    Further review of systems is otherwise negative/noncontributory (medical record and 13 point review of systems reviewed as well and pertinent positives noted).         Cardiac Diagnostics      Echocardiogram 5 May 2022:  1. Normal left ventricular size and systolic performance with ejection fraction of 55 to  "60%.  2. \"Borderline\" anterior, septal, and apical hypokinesis.  3. Normal right ventricular size and systolic performance.  4. Normal atrial dimensions.     Echocardiogram 14 January 2021:  1. Normal left ventricular size and systolic performance with ejection fraction of 55 to 60%.  2. No significant valvular heart disease.  3. Normal right ventricular size and systolic performance.  4. Normal atrial dimensions.    When compared to prior echocardiogram dated 14 August 2020, no significant change.    Echocardiogram 14 August 2020 (personally reviewed):  1. Normal left ventricular size and systolic performance with a visually estimated ejection fraction of 55-60%.   2. No significant valvular heart disease is identified on this study.   3. Normal right ventricular size and systolic performance.     Echocardiogram 23 May 2019:  1. Normal left ventricular size and systolic performance with ejection fraction of 65 to 70%.  2. No significant valvular heart disease.  3. Normal right ventricular size and systolic performance.  4. Normal atrial dimensions.    Echocardiogram 25 January 2018:  1. Limited echocardiogram.  2. Normal left ventricular size and systolic performance with ejection fraction of 60%.  3. No pericardial effusion.    Echocardiogram 27 December 2017:  1. Normal left ventricular size and systolic performance with ejection fraction 65-70%.  2. No significant valvular heart disease.  3. Normal right ventricular size and systolic performance  4. Normal atrial dimensions.    Echocardiogram 7 July 2014:  1. Normal left ventricular size and systolic performance with ejection fraction of 55-60%.  2. No significant valvular heart disease.  3. Normal right ventricular size and systolic performance.  4. Normal atrial dimensions.    Regadenoson MRI 24 July 2019:  1. Regadenoson stress cardiac MRI is negative for inducible myocardial ischemia.   2. Regadenoson stress ECG is negative for inducible myocardial " ischemia.  3. No previous myocardial infarction is identified.  4. Small left ventricular size, wall thickness and function. The calculated left ventricular ejection fraction is 69%.  5. Normal right ventricular size and function.  6. No obvious valvular disease.    Nuclear perfusion imaging study 22 January 2019:  1. No evidence of infarct or ischemia.  2. Normal left ventricular systolic performance with ejection fraction of 70%.    Nuclear perfusion imaging 1 March 2018:  1. No evidence of infarct or ischemia.  2. Normal left ventricular systolic performance with ejection fraction greater than 75%.    Nuclear perfusion imaging 28 December 2017 (pre-coronary angiogram):  1. No evidence of infarct or ischemia.  2. Increased stress to rest E ratio 1.46 possibly representing multivessel disease.  3. Normal left ventricular systolic performance with ejection fraction of 75%.    Coronary angiogram 5 May 2022:  1. Left main coronary artery: Normal.  2. Left anterior descending coronary artery: No significant obstructive disease with widely patent stent.  3. Circumflex coronary artery: Proximal 30% stenosis.  4. Right coronary artery: Dominant vessel with 25% distal stenosis.    Coronary angiogram 25 January 2018:  1. Left Main coronary artery: Normal.  2. Left anterior descending coronary artery: Proximal-mid 70% stenosis at diagonal bifurcation with fractional flow reserve of 0.80. 25 January 2018 with  3. Circumflex coronary artery: Mild disease.  4. Right coronary artery: Normal.  5. Status post PCI with stent placement to proximal-mid left anterior descending coronary artery (2.75×32 and 4.0×8 mm Synergy drug-eluting stents).    Holter monitor 13 August 2019:  1. Normal Holter monitor.    Holter monitor 22 January 2019:  1. Normal Holter.    30 day event recorder 20 March 2018 through 17 April 2018:  1. Normal 24-hour Holter monitor.  2. Symptoms correlating with sinus rhythm.     Twelve-lead ECG (personally  reviewed) 27 December 2017: Sinus rhythm. ?Normal ECG.           Physical Examination       /76 (BP Location: Left arm, Patient Position: Sitting, Cuff Size: Adult Small)   Pulse 108   Resp 24   Ht 1.524 m (5')   Wt 55.3 kg (122 lb)   BMI 23.83 kg/m          Wt Readings from Last 3 Encounters:   09/19/22 55.3 kg (122 lb)   09/06/22 53.5 kg (118 lb)   08/26/22 53.7 kg (118 lb 6.4 oz)     The patient is alert and oriented times three. Sclerae are anicteric. Mucosal membranes are moist. Jugular venous pressure is normal. No significant adenopathy/thyromegally appreciated. Lungs are clear with good expansion. On cardiovascular exam, the patient has a regular S1 and S2. Abdomen is soft and non-tender. Extremities reveal no clubbing, cyanosis, or edema.         Medications  Allergies   Current Outpatient Medications   Medication Sig Dispense Refill     acetaminophen (TYLENOL) 500 MG tablet TAKE 1 PILL BY MOUTH EVERY 6 HOURS AS NEEDED FOR PAIN 100 tablet 10     albuterol (PROAIR HFA/PROVENTIL HFA/VENTOLIN HFA) 108 (90 Base) MCG/ACT inhaler Inhale 2 puffs into the lungs every 4 hours as needed for shortness of breath / dyspnea 4 g 11     albuterol (PROVENTIL) (2.5 MG/3ML) 0.083% neb solution Take 1 vial (2.5 mg) by nebulization every 6 hours as needed 90 mL 11     ALLERGY RELIEF 10 MG tablet TAKE 1 TABLET (10 MG TOTAL) BY MOUTH DAILY FOR ALLERGIES 30 tablet 3     amitriptyline (ELAVIL) 10 MG tablet TAKE 2 TABLETS (20 MG TOTAL) BY MOUTH AT BEDTIME. 180 tablet 3     ASPIRIN LOW DOSE 81 MG EC tablet TAKE 1 TABLET (81 MG TOTAL) BY MOUTH DAILY. 90 tablet 3     atorvastatin (LIPITOR) 80 MG tablet TAKE 1 TABLET (80 MG TOTAL) BY MOUTH AT BEDTIME FOR CHOLESTEROL 90 tablet 3     B-D U/F 31G X 8 MM insulin pen needle USE ONE PEN NEEDLE DAILY AS NEEDED FOR  each 1     Continuous Blood Gluc  (FREESTYLE MONICA 14 DAY READER) MICHAEL Uses daily       Continuous Blood Gluc Sensor (FREESTYLE MONICA 14 DAY SENSOR) [facility] [place] 1 UNITS AS DIRECTED EVERY 14 (FOURTEEN) DAYS. 2 each 3     CONTOUR NEXT TEST test strip Pt checking blood sugar 2x per day       cyclobenzaprine (FLEXERIL) 5 MG tablet TAKE 1-2 TABLETS (5-10 MG) BY MOUTH 3 TIMES DAILY AS NEEDED FOR MUSCLE SPASMS 30 tablet 3     dextromethorphan (DELSYM) 30 MG/5ML liquid TAKE 10 MLS (60 MG) BY MOUTH 2 TIMES DAILY AS NEEDED FOR COUGH 178 mL 0     diclofenac (VOLTAREN) 1 % topical gel Apply 4 g topically 4 times daily 350 g 3     dupilumab (DUPIXENT) 300 MG/2ML prefilled pen Inject 2 mLs (300 mg) Subcutaneous every 14 days 2 mL 3     famotidine (PEPCID) 20 MG tablet Take 1 tablet (20 mg) by mouth 2 times daily 90 tablet 3     fluticasone-vilanterol (BREO ELLIPTA) 200-25 MCG/INH inhaler Inhale 1 puff into the lungs daily 60 each 11     gabapentin (NEURONTIN) 300 MG capsule Take 2 capsules (600 mg) by mouth 3 times daily 180 capsule 3     Global Inject Ease Lancets 30G MISC USE 1 EACH AS DIRECTED 3 (THREE) TIMES A DAY. DISPENSE BRAND PER PATIENT'S INSURANCE AT PHARMACY DISCRETION.(E11.9) 100 each 3     guaiFENesin (ROBITUSSIN) 100 MG/5ML liquid Take 10 mLs (200 mg) by mouth every 4 hours as needed for cough 200 mL 1     hydrochlorothiazide (MICROZIDE) 12.5 MG capsule TAKE 1 CAPSULE (12.5 MG TOTAL) BY MOUTH DAILY FOR BLOOD PRESSURE 90 capsule 3     hydrocortisone (CORTAID) 1 % external cream Apply topically 2 times daily 60 g 0     insulin aspart (NOVOLOG PEN) 100 UNIT/ML pen 10 U BID before meals 15 mL 4     Insulin Aspart FlexPen 100 UNIT/ML SOPN GIVE BEFORE MEALS: FOR PRE-MEAL GLUCOSE: 140-189 GIVE 1 UNIT, 190-239 GIVE 2 UNITS, 240-289 GIVE 3 UNITS, 290-339 GIVE 4 UNITS, =OR>340 GIVE 6 UNITS *84* 15 mL 0     insulin glargine (LANTUS PEN) 100 UNIT/ML pen Inject 10 Units Subcutaneous every morning AND 24 Units At Bedtime. 15 mL 3     insulin pen needle (32G X 4 MM) 32G X 4 MM miscellaneous Use one pen needles daily or as directed. 200 each 11     ipratropium - albuterol 0.5 mg/2.5  mg/3 mL (DUONEB) 0.5-2.5 (3) MG/3ML neb solution Take 1 vial (3 mLs) by nebulization every 6 hours as needed for shortness of breath / dyspnea or wheezing 360 mL 11     lidocaine (XYLOCAINE) 5 % external ointment Apply topically 3 times daily as needed Small amount (finger tip amount) to chest tid prn pain 35.44 g 0     LORazepam (ATIVAN) 0.5 MG tablet Take 1 tablet (0.5 mg) by mouth 2 times daily as needed for agitation or anxiety 30 tablet 0     meclizine (ANTIVERT) 12.5 MG tablet Take 2 tablets (25 mg) by mouth 3 times daily as needed for dizziness 30 tablet 0     metFORMIN (GLUCOPHAGE) 1000 MG tablet Take 1 tablet (1,000 mg) by mouth 2 times daily (with meals) 180 tablet 1     metoprolol tartrate (LOPRESSOR) 25 MG tablet TAKE 1 TABLET (25 MG TOTAL) BY MOUTH 2 (TWO) TIMES A DAY FOR BLOOD PRESSURE 180 tablet 3     montelukast (SINGULAIR) 10 MG tablet Take 1 tablet (10 mg) by mouth At Bedtime 30 tablet 6     omeprazole (PRILOSEC) 40 MG DR capsule Take 1 capsule (40 mg) by mouth daily 90 capsule 3     Polyethylene Glycol 3350 (PEG 3350) 17 GM/SCOOP POWD MIX 17 GRAMS WITH LIQUID AND DRINK ONCE DAILY FOR CONSTIPATION 510 g 12     polyvinyl alcohol (ARTIFICIAL TEARS) 1.4 % ophthalmic solution Place 1 drop into both eyes as needed for dry eyes 15 mL 3     predniSONE (DELTASONE) 20 MG tablet Take 2 tabs daily x 5 days 10 tablet 0     predniSONE (DELTASONE) 20 MG tablet Take 1 tablet (20 mg) by mouth 2 times daily 10 tablet 0     sucralfate (CARAFATE) 1 GM tablet TAKE 1 TABLET (1 G TOTAL) BY MOUTH 4 (FOUR) TIMES A DAY BEFORE MEALS AND AT BEDTIME. TAKE 1 HOUR PRIOR TO MEALS 120 tablet 11     tiotropium (SPIRIVA RESPIMAT) 2.5 MCG/ACT inhalation aerosol Inhale 2 puffs into the lungs daily 4 g 1     No Known Allergies       Lab Results    Chemistry/lipid CBC Cardiac Enzymes/BNP/TSH/INR   Recent Labs   Lab Test 03/25/22  1200   CHOL 145   HDL 52   LDL 54   TRIG 197*     Recent Labs   Lab Test 03/25/22  1200 11/30/21  1307  05/06/21  1106   LDL 54 48 43     Recent Labs   Lab Test 09/06/22  0934      POTASSIUM 4.1   CHLORIDE 107   CO2 22   *   BUN 12   CR 0.70   GFRESTIMATED >90   FREDY 9.7     Recent Labs   Lab Test 09/06/22  0934 07/11/22 2018 07/02/22 1929   CR 0.70 0.73 0.72     Recent Labs   Lab Test 08/25/22  0852 03/25/22  1200 11/30/21  1111   A1C 10.2* 10.9* 8.0*          Recent Labs   Lab Test 09/06/22  0934   WBC 8.9   HGB 11.5*   HCT 37.1   MCV 83        Recent Labs   Lab Test 09/06/22  0934 07/11/22 2018 07/02/22 1929   HGB 11.5* 12.0 12.5    Recent Labs   Lab Test 09/06/22  0934 07/11/22  2340 07/11/22 2018   TROPONINI 0.02 <0.01 <0.01     Recent Labs   Lab Test 07/11/22 2018 07/02/22 1929 01/03/22  1628 05/23/18  0630 09/16/14  1639   BNP 15 12 <10   < >  --    NTBNP  --   --   --   --  26    < > = values in this interval not displayed.     Recent Labs   Lab Test 07/14/22  1138   TSH 0.59     Recent Labs   Lab Test 09/06/22  0934 07/02/22 1929 05/27/21  1107   INR 1.02 1.03 0.98        Medical History  Surgical History Family History Social History   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     Microcytic anemia 11/17/2019     S/P coronary artery stent placement 02/02/2018     TB lung, latent     9 mos INH     Past Surgical History:   Procedure Laterality Date     CORONARY STENT PLACEMENT  2018     CV CORONARY ANGIOGRAM N/A 1/25/2018    Procedure: Coronary Angiogram;   Surgeon: Angelo Serarno MD;  Location: [facility] Cath Lab;  Service:      CV CORONARY ANGIOGRAM N/A 2022    Procedure: Coronary Angiogram;  Surgeon: Irwin Cano MD;  Location: [facility] CATH LAB CV     CV CORONARY ANGIOGRAM  2022    Procedure: ;  Surgeon: Irwin Cano MD;  Location: [facility] CATH [facility] CV     LA ESOPHAGOGASTRODUODENOSCOPY TRANSORAL DIAGNOSTIC N/A 12/10/2018    Procedure: ESOPHAGOGASTRODUODENOSCOPY (EGD);  Surgeon: Eddie Renteria MD;  Location: [facility];  Service: Gastroenterology     LA ESOPHAGOGASTRODUODENOSCOPY TRANSORAL DIAGNOSTIC N/A 12/3/2020    Procedure: ESOPHAGOGASTRODUODENOSCOPY (EGD) with biospies ;  Surgeon: Avi Crow MD;  Location: [facility];  Service: Gastroenterology     ZZ COLONOSCOPY W/WO BRUSH/WASH N/A 12/10/2018    Procedure: COLONOSCOPY with polypectomy using biopsy forceps;  Surgeon: Eddie Renteria MD;  Location: [facility];  Service: Gastroenterology     Family History   Problem Relation Age of Onset     Other - See Comments Mother          of an intestinal problem     Ulcers Father          of gastritis     Breast Cancer No family hx of         Social History     Socioeconomic History     Marital status:      Spouse name: Not on file     Number of children: Not on file     Years of education: Not on file     Highest education level: Not on file   Occupational History     Not on file   Tobacco Use     Smoking status: Never Smoker     Smokeless tobacco: Never Used     Tobacco comment: No passive exposure   Substance and Sexual Activity     Alcohol use: No     Drug use: No     Sexual activity: Yes     Partners: Male   Other Topics Concern     Parent/sibling w/ CABG, MI or angioplasty before 65F 55M? Not Asked   Social History Narrative    2017 The patient lives with her daughter-in-law (who is present), , son, and 2 grandchildren (total of 6 people). [place].     Social Determinants of Health      Financial Resource Strain: Not on file   Food Insecurity: Not on file   Transportation Needs: Not on file   Physical Activity: Not on file   Stress: Not on file   Social Connections: Not on file   Intimate Partner Violence: Not on file   Housing Stability: Not on file

## 2022-09-20 ENCOUNTER — OFFICE VISIT (OUTPATIENT)
Dept: PHARMACY | Facility: CLINIC | Age: 52
End: 2022-09-20
Payer: COMMERCIAL

## 2022-09-20 VITALS
HEART RATE: 102 BPM | BODY MASS INDEX: 24.02 KG/M2 | OXYGEN SATURATION: 98 % | SYSTOLIC BLOOD PRESSURE: 120 MMHG | WEIGHT: 123 LBS | DIASTOLIC BLOOD PRESSURE: 74 MMHG

## 2022-09-20 DIAGNOSIS — E11.9 TYPE 2 DIABETES MELLITUS TREATED WITHOUT INSULIN (H): Primary | ICD-10-CM

## 2022-09-20 DIAGNOSIS — E11.9 TYPE 2 DIABETES MELLITUS TREATED WITH INSULIN (H): ICD-10-CM

## 2022-09-20 DIAGNOSIS — J45.50 SEVERE PERSISTENT ASTHMA WITHOUT COMPLICATION (H): ICD-10-CM

## 2022-09-20 DIAGNOSIS — Z95.5 S/P CORONARY ARTERY STENT PLACEMENT: ICD-10-CM

## 2022-09-20 DIAGNOSIS — R52 PAIN: ICD-10-CM

## 2022-09-20 DIAGNOSIS — K21.9 GASTROESOPHAGEAL REFLUX DISEASE WITHOUT ESOPHAGITIS: ICD-10-CM

## 2022-09-20 DIAGNOSIS — Z79.4 TYPE 2 DIABETES MELLITUS TREATED WITH INSULIN (H): ICD-10-CM

## 2022-09-20 DIAGNOSIS — J45.50 SEVERE PERSISTENT ASTHMA, UNSPECIFIED WHETHER COMPLICATED (H): ICD-10-CM

## 2022-09-20 PROCEDURE — 99607 MTMS BY PHARM ADDL 15 MIN: CPT | Performed by: PHARMACIST

## 2022-09-20 PROCEDURE — 99606 MTMS BY PHARM EST 15 MIN: CPT | Performed by: PHARMACIST

## 2022-09-20 RX ORDER — DUPILUMAB 300 MG/2ML
300 INJECTION, SOLUTION SUBCUTANEOUS
Qty: 4 ML | Refills: 3 | Status: SHIPPED | OUTPATIENT
Start: 2022-09-20 | End: 2023-02-17

## 2022-09-20 NOTE — PROGRESS NOTES
Medication Therapy Management (MTM) Encounter    ASSESSMENT:                            Medication Adherence/Access: No issues identified    1. Type 2 diabetes mellitus treated without insulin (H)  Recent A1c remains elevated, not meeting goal of <7%.  Patient appropriately taking 40 units of insulin per PCP.  Although patient only taking NovoLog once daily, rather than twice daily as prescribed, therefore recommended resuming appropriately prescribed dose of bolus insulin.  Recommend patient continue to monitor blood sugars twice daily.    2. Gastroesophageal reflux disease without esophagitis  Patient notes that her symptoms have been well controlled on current therapy.  Patient currently prescribed PPI, H2 blocker, and sucralfate.  Could consider reducing/discontinuing sucralfate in the future to help with decreasing pill burden if patient symptoms remain controlled.    3. Severe persistent asthma, unspecified whether complicated  Managed by pulmonologist/allergist.  MTM contacted patient's pharmacy today to verify status of Dupixent injections.  Unfortunately, patient has still not received her refills, per [facility] they require a new prescription to be sent, will route to specialist today and recommend new prescription sent to appropriate specialty pharmacy.    4. S/P coronary artery stent placement  Managed by cardiologist, recently seen.  No changes recommended to medications today.    5. Pain  Managed on current medications, no changes recommended today.    PLAN:                            1.  Patient to take insulin as prescribed: NovoLog 10 units twice daily before meals  2.  Encouraged patient to continue monitoring blood sugars twice daily: Fasting and 2 hours postprandial  3.  Patient's [facility] pharmacy requires new prescription of Dupixent; routing to specialist Dr. Marie today    Follow-up: Return in about 8 weeks (around 11/16/2022) for with me.  PCP  10/25  SUBJECTIVE/OBJECTIVE:                          Kulwant Amin is a 52 year old female coming in for a follow-up visit. Patient was accompanied by daughter in law. Patient seen with [facility] . Today's visit is a follow-up MTM visit from 8/3/22. ID# [id number]    Reason for visit: MTM follow up.    Allergies/ADRs: Reviewed in chart  Past Medical History: Reviewed in chart  Tobacco: She reports that she has never smoked. She has never used smokeless tobacco.  Alcohol: none    Medication Adherence/Access: Patient uses pill box(es). Nurse sets up medications in a pillbox, visits her home.  Patient presents with medication vials today.  Patient takes medications 4 time(s) per day.   Per patient, misses medication 0 times per week.     Type 2 Diabetes:  Metformin 1000 mg twice daily, Lantus 40 units once daily (dose increased per PCP ),  Novolog 10 units once daily in the morning (prescribed twice daily before meals).   - Daughter in law helps patient with insulin injections to patient, reports no missed doses lately.   - Patient is not experiencing side effects.   Blood sugar monitorin time(s) daily. Ranges (patient blood sugar log):   Hypoglycemia? Symptomatic about once monthly, reports that the lowest has been 80. Aware of how to correct for lows.   Hyperglycemia? Dry mouth  Date FBG 1 hour postprandial    130 286    110 259    90 120   9/15 205 321    195 263    120 241    170 287   Diet/Exercise: Reports limiting portions, eating boiled rice. Reports eating   Aspirin: Taking 81mg daily for secondary prevention, admits blood in stool at times, not right now.   Statin: Yes: atorvastatin 80   ACEi/ARB: No.  Not indicated  Lab Results   Component Value Date    A1C 10.2 2022    A1C 10.9 2022    A1C 8.0 2021    A1C 9.2 2021     Creatinine   Date Value Ref Range Status   2022 0.70 0.60 - 1.10 mg/dL Final   2015 0.7 0.5 - 1.0 mg/dL Final      Latest  "Reference Range & Units 05/06/21 11:20   Microalbumin Urine mg/dL 0.00 - 1.99 mg/dL <0.50     GERD: omeprazole 40 mg daily, famotidine 20 mg twice daily, sucralfate 1 GM four times daily, and Tums as needed.   - Reports heartburn only sometimes and tums help to resolve symptoms.   - Per chart review patient recently seen in ED for chest pain, determined to be GI source.    Allergies/asthma: loratadine 10 mg daily, montelukast 10 mg daily, Breo Ellipta 200/25mcg 1 puff daily, Spiriva 2.5 mcg 2 puffs daily, albuterol neb three times daily every day and albuterol HFA daily as needed - reports using this daily lately.   Prescribed Dupixent 300 mg every 2 weeks injections. Has NOT gotten these injections for the last month due to insurance coverage issues. They aren't sure why they haven't gotten this, requesting clarification of this today.   - Patient reports that she is aware how ot use all of her inhalers and doesn't want to review inhaler technique during visit today.   - Reports that her breathing has been \"poor\" recently. Having wheezing and difficulty breathing.   - Recently prescribed Prednisone & delsym 10 ml twice daily prn. States that breathing was better when on these.   Pulmonologist: [facility]- Tamra Mhor.  Last visit 8/26/22, next 10/10/22.   Allergy & immunology: [facility]-Dr. Elizabeth Marie.  Last visit 8/23/2022.   Dupixent injections: Called [place] [facility] today who states that it was transferred to [facility] specialty due to insurance restriction - [id number]. transferred on 8/24. Information only from , needs new prescription from Dr. Marie.   [facility] Patient phone number: [phone]  Fax number for pharmacy [phone]  Patients account reference number - [id number]    SVT/CAD: Aspirin 81 mg daily, atorvastatin 80 mg daily, metoprolol tartrate 25 mg twice daily,  - Denies any symptoms of chest pain or dizziness. History of stroke and since then always has a " headache.   - No signs of bleeding right now, but has bleeding hemorrhoids usually once monthly. PCP is aware of patients hemorrhoids.   - Has meclizine 12.5 mg to take as needed for dizziness. Taking about 2-3 days per week.   Cardiologist: Del Hirsch MD. last visit was 9/19/2022    Pain: Cyclobenzaprine 5 mg 1-2 tablets three times daily prn, amitrityline 10 mg - 2 tablets at bedtime, voltaren 1% gel, gabpentin 600 mg three times daily.   - Reports that her nerve pain has improved. Reports that her sleep has been good, no concerns. Left the cyclobenzaprine and Voltaren gel at home.   - No daytime drowsiness.      Today's Vitals: /74   Pulse 102   Wt 123 lb (55.8 kg)   SpO2 98%   BMI 24.02 kg/m    ----------------      I spent 60 minutes with this patient today. All changes were made via collaborative practice agreement with Adrien Cardoza MD. A copy of the visit note was provided to the patient's provider(s).    The patient declined a summary of these recommendations.     Malu Gil, PharmD, BCACP  Medication Therapy Management Pharmacist     Medication Therapy Recommendations  Type 2 diabetes mellitus treated with insulin (H)    Current Medication: insulin aspart (NOVOLOG PEN) 100 UNIT/ML pen   Rationale: Frequency inappropriate - Dosage too low - Effectiveness   Recommendation: Increase Frequency   Status: Patient Agreed - Adherence/Education

## 2022-09-20 NOTE — Clinical Note
Dr. Marie, I saw patient today and she still hasn't received her Dupixent.  After contacting the pharmacy I discovered that it is because the specialty pharmacy requires a new prescription. I see that you were the last provider to send the prescription. Can you please send a new prescription to [facility] in [place] (I have saved this to favorites in the patients chart). I also suggest updating the quantity to 4 mLs with 3 refills (since 4 mls is a 28 days supply). Or if 84 day supply is desired then I would suggest 12 mLs.    Please let me know if you have any questions Malu Gil, PharmD, BCACP Medication Therapy Management Pharmacist

## 2022-09-24 DIAGNOSIS — E11.9 TYPE 2 DIABETES MELLITUS TREATED WITH INSULIN (H): Primary | ICD-10-CM

## 2022-09-24 DIAGNOSIS — Z79.4 TYPE 2 DIABETES MELLITUS TREATED WITH INSULIN (H): Primary | ICD-10-CM

## 2022-09-25 NOTE — TELEPHONE ENCOUNTER
"Routing refill request to provider for review/approval because:  Medication is reported/historical    Last Written Prescription Date:  ?  Last Fill Quantity: ?,  # refills: ?   Last office visit provider:  8/25/22     Requested Prescriptions   Pending Prescriptions Disp Refills     CONTOUR NEXT TEST test strip [Pharmacy Med Name: CONTOUR NEXT STRIPS 50 Strip] 50 strip 11     Sig: USE 1 EACH AS DIRECTED 3 (THREE) TIMES A DAY.       Diabetic Supplies Protocol Passed - 9/24/2022 12:08 PM        Passed - Medication is active on med list        Passed - Patient is 18 years of age or older        Passed - Recent (6 mo) or future (30 days) visit within the authorizing provider's specialty     Patient had office visit in the last 6 months or has a visit in the next 30 days with authorizing provider.  See \"Patient Info\" tab in inbasket, or \"Choose Columns\" in Meds & Orders section of the refill encounter.                 Kailee Patton 09/25/22 2:59 PM  "

## 2022-09-26 NOTE — TELEPHONE ENCOUNTER
Patient Returning Call    Reason for call: test strips for meter    Information relayed to patient: med was placed for approval on Saturday, the 24th and normally takes 3-5 days.      Patient has additional questions:  Yes    What are your questions/concerns:  Pt is out of strips so needs fill sent to [facility] today please. Can covering provider please fill?  Thanks    Could we send this information to you in [email or web address] or would you prefer to receive a phone call?:   Patient would prefer a phone call     Okay to leave a detailed message?: Yes at [phone] son Rhys    Call taken on 9/26/22 at 1210 pm by Lisa Martinez CMA TC

## 2022-09-30 ENCOUNTER — TELEPHONE (OUTPATIENT)
Dept: PULMONOLOGY | Facility: OTHER | Age: 52
End: 2022-09-30

## 2022-09-30 DIAGNOSIS — J45.901 ASTHMA EXACERBATION: ICD-10-CM

## 2022-09-30 RX ORDER — PREDNISONE 20 MG/1
TABLET ORAL
Qty: 10 TABLET | Refills: 0 | Status: SHIPPED | OUTPATIENT
Start: 2022-09-30 | End: 2022-10-12

## 2022-09-30 NOTE — TELEPHONE ENCOUNTER
Phone call from patient's daughter-in-law, Luba. States that Kulwant has a cough back again.  She completed prednisone burst 9/16 which was somewhat helpful, but cough is back again.     Will prescribe another prednisone burst. Also suggested over-the-counter cough syrup if that is helpful.

## 2022-09-30 NOTE — TELEPHONE ENCOUNTER
Central Prior Authorization Team   Phone: [phone]      Prior Authorization Not Needed per Insurance    Medication: fluticasone-vilanterol (BREO ELLIPTA) 200-25 MCG/INH inhaler--not needed  Insurance Company: [employer] - Phone [phone] Fax [phone]  Expected CoPay:      Pharmacy Filling the Rx: [facility] - [place], [place] - [id number]  Pharmacy Notified: Yes  Patient Notified: Yes PHARMACY WILL CONTACT    WAS FILLED ON 9/20/22

## 2022-09-30 NOTE — TELEPHONE ENCOUNTER
Previous refill request sent   Prior Authorization Retail Medication Request    Medication/Dose: Breo ellipta inhaler  ICD code (if different than what is on RX):  J45.41  Previously Tried and Failed:  Dulera (years ago)  Rationale:  Not effective    Insurance Name: [employer] [place]  Insurance ID:  [id number]      Pharmacy Information (if different than what is on RX)  Name: [facility]  Phone:          Patient has been well maintained on this medication since 2020.  Reduction in hospitalizations

## 2022-10-03 ENCOUNTER — OFFICE VISIT (OUTPATIENT)
Dept: FAMILY MEDICINE | Facility: CLINIC | Age: 52
End: 2022-10-03
Payer: COMMERCIAL

## 2022-10-03 VITALS
TEMPERATURE: 98 F | OXYGEN SATURATION: 98 % | BODY MASS INDEX: 23.56 KG/M2 | DIASTOLIC BLOOD PRESSURE: 72 MMHG | HEIGHT: 60 IN | RESPIRATION RATE: 16 BRPM | SYSTOLIC BLOOD PRESSURE: 120 MMHG | HEART RATE: 90 BPM | WEIGHT: 120 LBS

## 2022-10-03 DIAGNOSIS — R42 VERTIGO: ICD-10-CM

## 2022-10-03 DIAGNOSIS — R05.1 ACUTE COUGH: Primary | ICD-10-CM

## 2022-10-03 PROCEDURE — 99213 OFFICE O/P EST LOW 20 MIN: CPT | Performed by: FAMILY MEDICINE

## 2022-10-03 RX ORDER — DEXTROMETHORPHAN POLISTIREX 30 MG/5ML
60 SUSPENSION ORAL 2 TIMES DAILY PRN
Qty: 178 ML | Refills: 0 | Status: SHIPPED | OUTPATIENT
Start: 2022-10-03 | End: 2022-12-22

## 2022-10-03 ASSESSMENT — PATIENT HEALTH QUESTIONNAIRE - PHQ9
10. IF YOU CHECKED OFF ANY PROBLEMS, HOW DIFFICULT HAVE THESE PROBLEMS MADE IT FOR YOU TO DO YOUR WORK, TAKE CARE OF THINGS AT HOME, OR GET ALONG WITH OTHER PEOPLE: VERY DIFFICULT
SUM OF ALL RESPONSES TO PHQ QUESTIONS 1-9: 13
SUM OF ALL RESPONSES TO PHQ QUESTIONS 1-9: 13

## 2022-10-03 NOTE — PROGRESS NOTES
Assessment & Plan     ER follow up - abdominal pain, vertigo  Abdominal pain resolved since ER visit.  Vertigo continues.  MRI/MRA head and neck in the ER unremarkable.  Neuro referral was placed in the ER, she is on cancellation list to get in prior to her February appointment.  Offered referral to PT/OT, but they declined.  They have an appointment with her PCP in about 3 weeks and would like to discuss it then.    Acute cough, COPD exacerbation  They request cough suppressant, states she has had dextromethorphan in the past from PCP and would like refill of this.  Cough has improved since starting the prednisone.  Denies any worsening shortness of breath from her baseline, denies fever, and lung exam is reassuring today.  Discussed the prednisone will elevate blood sugars and they should watch her diabetes closely.  - dextromethorphan (DELSYM) 30 MG/5ML liquid  Dispense: 178 mL; Refill: 0                   No follow-ups on file.    Amie Constantino MD  [facility] RYAN Blum is a 52 year old, presenting for the following health issues:  Hospital F/U      HPI   Patient was seen in emergency room September 6 for vertigo and left lower quadrant abdominal pain.  MRI/MRA head and neck performed and unremarkable.  Referred to neurology for dizziness, first available appointment in February but on a wait list.  For her left lower quadrant abdominal pain CT abdomen pelvis was done and unremarkable for cause of pain.    Since the time of ER visit her abdominal pain has resolved.  She still has daily vertigo episodes.  They have prescription for meclizine which helps.    She was given a prednisone prescription recently for worsening cough, history of COPD.  Reports prednisone is helping.  No fever.  Reports breathing is at baseline, no worsening shortness of breath.    For her type 2 diabetes they report compliance with 40 units of Lantus insulin once daily and 10 units of NovoLog twice  daily with morning and evening meal.    Since her ER      Review of Systems         Objective    /72   Pulse 90   Temp 98  F (36.7  C) (Oral)   Resp 16   Ht 1.524 m (5')   Wt 54.4 kg (120 lb)   SpO2 98%   BMI 23.44 kg/m    Body mass index is 23.44 kg/m .  Physical Exam   GENERAL: healthy, alert and no distress  EYES: Eyes grossly normal to inspection, PERRL and conjunctivae and sclerae normal  NECK: no adenopathy  RESP: lungs clear to auscultation - no rales, rhonchi or wheezes  CV: regular rate and rhythm, normal S1 S2, no S3 or S4, no murmur, click or rub, no peripheral edema  MS: no gross musculoskeletal defects noted, no edema                    Answers for HPI/ROS submitted by the patient on 10/3/2022  If you checked off any problems, how difficult have these problems made it for you to do your work, take care of things at home, or get along with other people?: Very difficult  PHQ9 TOTAL SCORE: 13

## 2022-10-12 ENCOUNTER — OFFICE VISIT (OUTPATIENT)
Dept: PULMONOLOGY | Facility: OTHER | Age: 52
End: 2022-10-12
Payer: COMMERCIAL

## 2022-10-12 VITALS
SYSTOLIC BLOOD PRESSURE: 120 MMHG | OXYGEN SATURATION: 99 % | BODY MASS INDEX: 23.49 KG/M2 | WEIGHT: 120.3 LBS | DIASTOLIC BLOOD PRESSURE: 66 MMHG | HEART RATE: 98 BPM

## 2022-10-12 DIAGNOSIS — J45.51 SEVERE PERSISTENT ASTHMA WITH EXACERBATION (H): Primary | ICD-10-CM

## 2022-10-12 DIAGNOSIS — B37.0 ORAL THRUSH: ICD-10-CM

## 2022-10-12 DIAGNOSIS — Z91.09 ENVIRONMENTAL ALLERGIES: ICD-10-CM

## 2022-10-12 DIAGNOSIS — D72.10 EOSINOPHILIA, UNSPECIFIED TYPE: ICD-10-CM

## 2022-10-12 PROCEDURE — 94640 AIRWAY INHALATION TREATMENT: CPT | Performed by: INTERNAL MEDICINE

## 2022-10-12 PROCEDURE — 99214 OFFICE O/P EST MOD 30 MIN: CPT | Performed by: NURSE PRACTITIONER

## 2022-10-12 RX ORDER — IPRATROPIUM BROMIDE AND ALBUTEROL SULFATE 2.5; .5 MG/3ML; MG/3ML
3 SOLUTION RESPIRATORY (INHALATION) ONCE
Status: COMPLETED | OUTPATIENT
Start: 2022-10-12 | End: 2022-10-12

## 2022-10-12 RX ORDER — NYSTATIN 100000/ML
500000 SUSPENSION, ORAL (FINAL DOSE FORM) ORAL 4 TIMES DAILY
Qty: 473 ML | Refills: 1 | Status: SHIPPED | OUTPATIENT
Start: 2022-10-12 | End: 2022-10-28

## 2022-10-12 RX ORDER — PREDNISONE 10 MG/1
TABLET ORAL
Qty: 33 TABLET | Refills: 0 | Status: SHIPPED | OUTPATIENT
Start: 2022-10-12 | End: 2022-10-27

## 2022-10-12 RX ADMIN — IPRATROPIUM BROMIDE AND ALBUTEROL SULFATE 3 ML: 2.5; .5 SOLUTION RESPIRATORY (INHALATION) at 09:53

## 2022-10-12 ASSESSMENT — ASTHMA QUESTIONNAIRES
QUESTION_4 LAST FOUR WEEKS HOW OFTEN HAVE YOU USED YOUR RESCUE INHALER OR NEBULIZER MEDICATION (SUCH AS ALBUTEROL): ONE OR TWO TIMES PER DAY
QUESTION_5 LAST FOUR WEEKS HOW WOULD YOU RATE YOUR ASTHMA CONTROL: POORLY CONTROLLED
ACT_TOTALSCORE: 7
QUESTION_1 LAST FOUR WEEKS HOW MUCH OF THE TIME DID YOUR ASTHMA KEEP YOU FROM GETTING AS MUCH DONE AT WORK, SCHOOL OR AT HOME: ALL OF THE TIME
EMERGENCY_ROOM_LAST_YEAR_TOTAL: THREE OR MORE
HOSPITALIZATION_OVERNIGHT_LAST_YEAR_TOTAL: THREE OR MORE
ACT_TOTALSCORE: 7
QUESTION_2 LAST FOUR WEEKS HOW OFTEN HAVE YOU HAD SHORTNESS OF BREATH: MORE THAN ONCE A DAY
QUESTION_3 LAST FOUR WEEKS HOW OFTEN DID YOUR ASTHMA SYMPTOMS (WHEEZING, COUGHING, SHORTNESS OF BREATH, CHEST TIGHTNESS OR PAIN) WAKE YOU UP AT NIGHT OR EARLIER THAN USUAL IN THE MORNING: FOUR OR MORE NIGHTS A WEEK

## 2022-10-12 NOTE — PROGRESS NOTES
Pulmonary Outpatient Clinic Follow Up  October 12, 2022        Assessment/Plan:    52 y.o.-year-old woman with history of organic fuel exposure in the home was previously had nondiagnostic PFTs.  She was previously evaluated by Dr. Marie and initiated on Dupixent for her hypereosinophilia with some response to this, however, due to insurance issues she has not had this for over 2 months. She is also reporting symptoms of thrust x 2 weeks. Her hypereosinophilia evaluation has been unremarkable.  For the present we would recommend;    Given her worsening symptoms and breathing today in clinic she was administered a DuoNeb.    We will contact Dr. Marie's office to check on status of depicts an order.  If a prior authorization needs to be filled out we can help her facilitate this.    Continue Breo Ellipta 1 puff daily.  She knows to gargle after using this.    Continue Spiriva.    Continue to use 3 times daily albuterol nebs.    Continue Singulair daily     Continue Protonix 40 mg daily.    Continue Dupixent injections (increased dose).    As needed albuterol for rescue.    Start nystatin oral rinse QID, swish and swallow until symptoms have resolved, then an additional 2 days.     Has received both doses of her Covid-19 vaccine, UTD on booster    Has had seasonal flu vaccine    Continue Tums as needed for gastric upset.     Return to clinic in 2-3 weeks.     Jenn Mike, CNP  Pulmonary Medicine  [facility]   [phone]        Subjective:   Patient ID: Ms. Amin is a 52 y.o.female with a past medical history significant for anxiety, arthritis, questionable asthma versus COPD, diabetes, hypertension and a prior history of SVT presents with a follow-up visit for her pulmonary complaints.  She follows with me fairly closely for her moderate-persistent, difficult to control asthma symptoms.   She describes no change since her last visit maybe even worsening of symptoms. Still having cough, SOB, wheeze, and chest  tightness. Cough varies and it both productive and dry at times. Symptoms do not change throughout the day. She wakes at every night coughing.  Of note, she ran out of Dupixent, and this has been re-ordered by Dr. Marie (has not received it yet). She has been off of this for over 2 months now.   She continues to experience chest tightness, wheezing, nighttime awakenings and shortness of breath. She was initiated on prednisone by Dr. Marie last week and reports no change in symptoms with this.  Using abuterol nebs TID, Spiriva daily.   Breo daily, she has been rinsing her mouth after each use but is reporting mouth pain with white patches and sores that has made it difficult to eat for the past few weeks.       ACT Total Scores 7/14/2022 8/26/2022 10/12/2022   ACT TOTAL SCORE - - -   ASTHMA ER VISITS - - -   ASTHMA HOSPITALIZATIONS - - -   ACT TOTAL SCORE (Goal Greater than or Equal to 20) 7 12 7   In the past 12 months, how many times did you visit the emergency room for your asthma without being admitted to the hospital? 2 1 3   In the past 12 months, how many times were you hospitalized overnight because of your asthma? 2 0 3         Pertinent past medical history:  52-year-old female here for follow-up.  Her breathing is a bit worse than 6 months ago.  She had multiple ER visits and evaluations.  This includes negative PE study.  She had a cath with a 75% LAD lesion which was intervened upon.  Since her catheterization she feels she has more heartburn.  Her breathing is worse with exertion.  Improves with rest.  It is also worse with cold weather.  Warm weather and humid air does not bother.  Symptoms localized to the chest. Pertinent negatives include no fever or hemoptysis.    Current Outpatient Medications   Medication Sig Dispense Refill     acetaminophen (TYLENOL) 500 MG tablet TAKE 1 PILL BY MOUTH EVERY 6 HOURS AS NEEDED FOR PAIN 100 tablet 10     albuterol (PROAIR HFA/PROVENTIL HFA/VENTOLIN HFA) 108 (90 Base)  MCG/ACT inhaler Inhale 2 puffs into the lungs every 4 hours as needed for shortness of breath / dyspnea 4 g 11     albuterol (PROVENTIL) (2.5 MG/3ML) 0.083% neb solution Take 1 vial (2.5 mg) by nebulization every 6 hours as needed 90 mL 11     ALLERGY RELIEF 10 MG tablet TAKE 1 TABLET (10 MG TOTAL) BY MOUTH DAILY FOR ALLERGIES 30 tablet 3     amitriptyline (ELAVIL) 10 MG tablet TAKE 2 TABLETS (20 MG TOTAL) BY MOUTH AT BEDTIME. 180 tablet 3     ASPIRIN LOW DOSE 81 MG EC tablet TAKE 1 TABLET (81 MG TOTAL) BY MOUTH DAILY. 90 tablet 3     atorvastatin (LIPITOR) 80 MG tablet TAKE 1 TABLET (80 MG TOTAL) BY MOUTH AT BEDTIME FOR CHOLESTEROL 90 tablet 3     B-D U/F 31G X 8 MM insulin pen needle USE ONE PEN NEEDLE DAILY AS NEEDED FOR  each 1     Continuous Blood Gluc  (FREESTYLE MONICA 14 DAY READER) MICHAEL Uses daily       Continuous Blood Gluc Sensor (FREESTYLE MONICA 14 DAY SENSOR) MISC USE 1 UNITS AS DIRECTED EVERY 14 (FOURTEEN) DAYS. 2 each 3     CONTOUR NEXT TEST test strip USE 1 EACH AS DIRECTED 3 (THREE) TIMES A DAY. 50 strip 11     cyclobenzaprine (FLEXERIL) 5 MG tablet TAKE 1-2 TABLETS (5-10 MG) BY MOUTH 3 TIMES DAILY AS NEEDED FOR MUSCLE SPASMS 30 tablet 3     dextromethorphan (DELSYM) 30 MG/5ML liquid Take 10 mLs (60 mg) by mouth 2 times daily as needed for cough 178 mL 0     diclofenac (VOLTAREN) 1 % topical gel Apply 4 g topically 4 times daily 350 g 3     dupilumab (DUPIXENT) 300 MG/2ML prefilled pen Inject 2 mLs (300 mg) Subcutaneous every 14 days 4 mL 3     famotidine (PEPCID) 20 MG tablet Take 1 tablet (20 mg) by mouth 2 times daily 90 tablet 3     fluticasone-vilanterol (BREO ELLIPTA) 200-25 MCG/INH inhaler Inhale 1 puff into the lungs daily 60 each 11     gabapentin (NEURONTIN) 300 MG capsule Take 2 capsules (600 mg) by mouth 3 times daily 180 capsule 3     Global Inject Ease Lancets 30G MISC USE 1 EACH AS DIRECTED 3 (THREE) TIMES A DAY. DISPENSE BRAND PER PATIENT'S INSURANCE AT PHARMACY  DISCRETION.(E11.9) 100 each 3     guaiFENesin (ROBITUSSIN) 100 MG/5ML liquid Take 10 mLs (200 mg) by mouth every 4 hours as needed for cough 200 mL 1     hydrochlorothiazide (MICROZIDE) 12.5 MG capsule TAKE 1 CAPSULE (12.5 MG TOTAL) BY MOUTH DAILY FOR BLOOD PRESSURE 90 capsule 3     hydrocortisone (CORTAID) 1 % external cream Apply topically 2 times daily 60 g 0     insulin aspart (NOVOLOG PEN) 100 UNIT/ML pen 10 U BID before meals 15 mL 4     insulin glargine (LANTUS PEN) 100 UNIT/ML pen Inject 40 Units Subcutaneous every morning 45 mL 3     insulin pen needle (32G X 4 MM) 32G X 4 MM miscellaneous Use one pen needles daily or as directed. 200 each 11     ipratropium - albuterol 0.5 mg/2.5 mg/3 mL (DUONEB) 0.5-2.5 (3) MG/3ML neb solution Take 1 vial (3 mLs) by nebulization every 6 hours as needed for shortness of breath / dyspnea or wheezing 360 mL 11     lidocaine (XYLOCAINE) 5 % external ointment Apply topically 3 times daily as needed Small amount (finger tip amount) to chest tid prn pain 35.44 g 0     LORazepam (ATIVAN) 0.5 MG tablet Take 1 tablet (0.5 mg) by mouth 2 times daily as needed for agitation or anxiety 30 tablet 0     meclizine (ANTIVERT) 12.5 MG tablet Take 2 tablets (25 mg) by mouth 3 times daily as needed for dizziness 30 tablet 0     metFORMIN (GLUCOPHAGE) 1000 MG tablet Take 1 tablet (1,000 mg) by mouth 2 times daily (with meals) 180 tablet 1     metoprolol tartrate (LOPRESSOR) 25 MG tablet TAKE 1 TABLET (25 MG TOTAL) BY MOUTH 2 (TWO) TIMES A DAY FOR BLOOD PRESSURE 180 tablet 3     montelukast (SINGULAIR) 10 MG tablet Take 1 tablet (10 mg) by mouth At Bedtime 30 tablet 6     nystatin (MYCOSTATIN) 311674 UNIT/ML suspension Take 5 mLs (500,000 Units) by mouth 4 times daily 473 mL 1     omeprazole (PRILOSEC) 40 MG DR capsule Take 1 capsule (40 mg) by mouth daily 90 capsule 3     Polyethylene Glycol 3350 (PEG 3350) 17 GM/SCOOP POWD MIX 17 GRAMS WITH LIQUID AND DRINK ONCE DAILY FOR CONSTIPATION 510 g  12     polyvinyl alcohol (ARTIFICIAL TEARS) 1.4 % ophthalmic solution Place 1 drop into both eyes as needed for dry eyes 15 mL 3     predniSONE (DELTASONE) 10 MG tablet Take 4 tablets (40 mg) by mouth daily for 3 days, THEN 3 tablets (30 mg) daily for 3 days, THEN 2 tablets (20 mg) daily for 3 days, THEN 1 tablet (10 mg) daily for 6 days. 33 tablet 0     predniSONE (DELTASONE) 20 MG tablet Take 1 tablet (20 mg) by mouth 2 times daily 10 tablet 0     sucralfate (CARAFATE) 1 GM tablet TAKE 1 TABLET (1 G TOTAL) BY MOUTH 4 (FOUR) TIMES A DAY BEFORE MEALS AND AT BEDTIME. TAKE 1 HOUR PRIOR TO MEALS 120 tablet 11     tiotropium (SPIRIVA RESPIMAT) 2.5 MCG/ACT inhalation aerosol Inhale 2 puffs into the lungs daily 4 g 1     predniSONE (DELTASONE) 20 MG tablet Take 2 tabs daily x 5 days 10 tablet 0     Social history:  Lives in a house built in 1963; no concern for mold in the house.  7 People live in the house including 2 children.  There are no pets in the house.  She is a never smoker and there is no second hand exposure to smoke.  She does not drink alcoholic beverages and denies indulging in recreational drugs.    Physical Exam   /66 (BP Location: Right arm, Patient Position: Chair, Cuff Size: Adult Regular)   Pulse 98   Wt 54.6 kg (120 lb 4.8 oz)   SpO2 99%   BMI 23.49 kg/m    Physical Exam  Physical Exam  Constitutional:       General: She is not in acute distress.     Appearance: She is not ill-appearing or diaphoretic.   HENT:      Nose: Nose normal.   Cardiovascular:      Rate and Rhythm: Normal rate and regular rhythm.      Pulses: Normal pulses.      Heart sounds: Normal heart sounds.   Pulmonary:      Effort: Tachypnea present. No respiratory distress.      Breath sounds: No stridor. Wheezing (coarse, throughout. inspiratory and expiratory) present.      Comments: Active dry cough during exam. Audible wheezes while breathing.   Skin:     General: Skin is warm and dry.      Findings: No rash.    Neurological:      Mental Status: She is alert.   Psychiatric:         Behavior: Behavior normal.          Latest Reference Range & Units 02/07/22 15:47   Neutrophil Cytoplasmic Antibody <1:10  <1:10   Neutrophil Cytoplasmic Antibody Pattern  The ANCA IFA is <1:10.  No further testing will be performed.      Latest Reference Range & Units 02/07/22 15:47   Schistosoma Ab IgG Negative  Negative [1]   Strongyloides Bere IgG <=0.9 IV 0.4 [2]      Latest Reference Range & Units 02/07/22 15:47   Immunoglobulin E <=214 kU/L 74 [1]   Allergen Fungimold A Fumigatus IgE <=0.34 kU/L <0.10 [2]   Aspergillus Fumagatis 1 Antibody None Detected  None Detected [3]   Aspergillus Fumagatis 6 Antibody None Detected  None Detected [4]   Allergen, Interp, Immunocap Score IgE  See Note [5]       EXAM: CT CHEST W/O CONTRAST  LOCATION: [facility]  DATE/TIME: 7/11/2022 11:28 PM  INDICATION: Chest pain.  COMPARISON: 5/4/2022.  TECHNIQUE: CT chest without IV contrast. Multiplanar reformats were obtained. Dose reduction techniques were used.  CONTRAST: None.  FINDINGS:   LUNGS AND PLEURA: There is atelectasis and scarring at the lung bases. Scarring at the lung apices. Mild dependent atelectasis bilaterally. Subpleural calcified granuloma or calcified plaque in the right lower lobe laterally. Stable 2 mm nodule in the   right upper lobe posteriorly. Stable 3 mm nodule in the lingula laterally. Tiny nodule along the left major fissure laterally is stable. No pneumothorax or pleural effusion.  MEDIASTINUM/AXILLAE: No lymph node enlargement. Central airways are unremarkable. The heart size is normal.  CORONARY ARTERY CALCIFICATION: Previous intervention (stents or CABG).  UPPER ABDOMEN: No acute upper abdominal abnormality.  MUSCULOSKELETAL: Unremarkable.                                                                  IMPRESSION:   1.  No acute abnormality. No cause of chest pain is evident.  2.  A few small lung  nodules without significant change.  Recommendations for one or multiple incidental lung nodules < 6mm :    Low risk patients: No routine follow-up.    High risk patients: Optional follow-up CT at 12 months; if unchanged, no further follow-up.    ECHO 5/4/2022  Interpretation Summary  Left ventricular size, wall motion and function are normal. The ejection  fraction is 55-60%.  There is borderline anterior, septal, and apical wall hypokinesis.  Normal right ventricle size and systolic function.  No hemodynamically significant valvular abnormalities on 2D or color flow  imaging.

## 2022-10-13 ENCOUNTER — HOSPITAL ENCOUNTER (EMERGENCY)
Facility: HOSPITAL | Age: 52
Discharge: HOME | End: 2022-10-13
Attending: EMERGENCY MEDICINE | Admitting: EMERGENCY MEDICINE
Payer: COMMERCIAL

## 2022-10-13 ENCOUNTER — TELEPHONE (OUTPATIENT)
Dept: PULMONOLOGY | Facility: OTHER | Age: 52
End: 2022-10-13

## 2022-10-13 ENCOUNTER — APPOINTMENT (OUTPATIENT)
Dept: RADIOLOGY | Facility: HOSPITAL | Age: 52
End: 2022-10-13
Attending: EMERGENCY MEDICINE
Payer: COMMERCIAL

## 2022-10-13 VITALS
OXYGEN SATURATION: 97 % | HEART RATE: 115 BPM | DIASTOLIC BLOOD PRESSURE: 70 MMHG | WEIGHT: 126 LBS | TEMPERATURE: 98.2 F | HEIGHT: 58 IN | RESPIRATION RATE: 32 BRPM | BODY MASS INDEX: 26.45 KG/M2 | SYSTOLIC BLOOD PRESSURE: 118 MMHG

## 2022-10-13 DIAGNOSIS — J45.901 EXACERBATION OF ASTHMA, UNSPECIFIED ASTHMA SEVERITY, UNSPECIFIED WHETHER PERSISTENT: ICD-10-CM

## 2022-10-13 DIAGNOSIS — R73.9 HYPERGLYCEMIA: ICD-10-CM

## 2022-10-13 LAB
ANION GAP SERPL CALCULATED.3IONS-SCNC: 14 MMOL/L (ref 7–15)
ATRIAL RATE - MUSE: 82 BPM
BUN SERPL-MCNC: 10.4 MG/DL (ref 6–20)
CALCIUM SERPL-MCNC: 9.1 MG/DL (ref 8.6–10)
CHLORIDE SERPL-SCNC: 101 MMOL/L (ref 98–107)
CREAT SERPL-MCNC: 0.51 MG/DL (ref 0.51–0.95)
DEPRECATED HCO3 PLAS-SCNC: 23 MMOL/L (ref 22–29)
DIASTOLIC BLOOD PRESSURE - MUSE: 74 MMHG
ERYTHROCYTE [DISTWIDTH] IN BLOOD BY AUTOMATED COUNT: 15.3 % (ref 10–15)
FLUAV RNA SPEC QL NAA+PROBE: NEGATIVE
FLUBV RNA RESP QL NAA+PROBE: NEGATIVE
GFR SERPL CREATININE-BSD FRML MDRD: >90 ML/MIN/1.73M2
GLUCOSE SERPL-MCNC: 350 MG/DL (ref 70–99)
HCT VFR BLD AUTO: 37.3 % (ref 35–47)
HGB BLD-MCNC: 11.6 G/DL (ref 11.7–15.7)
INTERPRETATION ECG - MUSE: NORMAL
MCH RBC QN AUTO: 25.4 PG (ref 26.5–33)
MCHC RBC AUTO-ENTMCNC: 31.1 G/DL (ref 31.5–36.5)
MCV RBC AUTO: 82 FL (ref 78–100)
P AXIS - MUSE: 41 DEGREES
PLATELET # BLD AUTO: 166 10E3/UL (ref 150–450)
POTASSIUM SERPL-SCNC: 4.3 MMOL/L (ref 3.4–5.3)
PR INTERVAL - MUSE: 142 MS
PROCALCITONIN SERPL IA-MCNC: 0.08 NG/ML
QRS DURATION - MUSE: 86 MS
QT - MUSE: 400 MS
QTC - MUSE: 467 MS
R AXIS - MUSE: -20 DEGREES
RBC # BLD AUTO: 4.57 10E6/UL (ref 3.8–5.2)
RSV RNA SPEC NAA+PROBE: NEGATIVE
SARS-COV-2 RNA RESP QL NAA+PROBE: NEGATIVE
SODIUM SERPL-SCNC: 138 MMOL/L (ref 136–145)
SYSTOLIC BLOOD PRESSURE - MUSE: 114 MMHG
T AXIS - MUSE: 46 DEGREES
TROPONIN T SERPL HS-MCNC: <6 NG/L
VENTRICULAR RATE- MUSE: 82 BPM
WBC # BLD AUTO: 6.2 10E3/UL (ref 4–11)

## 2022-10-13 PROCEDURE — 93005 ELECTROCARDIOGRAM TRACING: CPT

## 2022-10-13 PROCEDURE — 93005 ELECTROCARDIOGRAM TRACING: CPT | Performed by: EMERGENCY MEDICINE

## 2022-10-13 PROCEDURE — 84145 PROCALCITONIN (PCT): CPT | Performed by: EMERGENCY MEDICINE

## 2022-10-13 PROCEDURE — 99285 EMERGENCY DEPT VISIT HI MDM: CPT | Mod: 25

## 2022-10-13 PROCEDURE — 999N000157 HC STATISTIC RCP TIME EA 10 MIN

## 2022-10-13 PROCEDURE — 85027 COMPLETE CBC AUTOMATED: CPT | Performed by: EMERGENCY MEDICINE

## 2022-10-13 PROCEDURE — 80048 BASIC METABOLIC PNL TOTAL CA: CPT | Performed by: EMERGENCY MEDICINE

## 2022-10-13 PROCEDURE — C9803 HOPD COVID-19 SPEC COLLECT: HCPCS

## 2022-10-13 PROCEDURE — 250N000009 HC RX 250: Performed by: EMERGENCY MEDICINE

## 2022-10-13 PROCEDURE — 94640 AIRWAY INHALATION TREATMENT: CPT

## 2022-10-13 PROCEDURE — 87637 SARSCOV2&INF A&B&RSV AMP PRB: CPT | Performed by: EMERGENCY MEDICINE

## 2022-10-13 PROCEDURE — 36415 COLL VENOUS BLD VENIPUNCTURE: CPT | Performed by: EMERGENCY MEDICINE

## 2022-10-13 PROCEDURE — 71046 X-RAY EXAM CHEST 2 VIEWS: CPT

## 2022-10-13 PROCEDURE — 84484 ASSAY OF TROPONIN QUANT: CPT | Performed by: EMERGENCY MEDICINE

## 2022-10-13 RX ORDER — IPRATROPIUM BROMIDE AND ALBUTEROL SULFATE 2.5; .5 MG/3ML; MG/3ML
3 SOLUTION RESPIRATORY (INHALATION) ONCE
Status: COMPLETED | OUTPATIENT
Start: 2022-10-13 | End: 2022-10-13

## 2022-10-13 RX ORDER — DOXYCYCLINE 100 MG/1
100 CAPSULE ORAL 2 TIMES DAILY
Qty: 14 CAPSULE | Refills: 0 | Status: SHIPPED | OUTPATIENT
Start: 2022-10-13 | End: 2022-10-20

## 2022-10-13 RX ADMIN — IPRATROPIUM BROMIDE AND ALBUTEROL SULFATE 3 ML: .5; 3 SOLUTION RESPIRATORY (INHALATION) at 08:59

## 2022-10-13 RX ADMIN — IPRATROPIUM BROMIDE AND ALBUTEROL SULFATE 3 ML: .5; 3 SOLUTION RESPIRATORY (INHALATION) at 10:30

## 2022-10-13 ASSESSMENT — ENCOUNTER SYMPTOMS
CHEST TIGHTNESS: 1
WHEEZING: 1
COUGH: 1
SORE THROAT: 0
FEVER: 0

## 2022-10-13 ASSESSMENT — ACTIVITIES OF DAILY LIVING (ADL): ADLS_ACUITY_SCORE: 35

## 2022-10-13 NOTE — ED PROVIDER NOTES
EMERGENCY DEPARTMENT ENCOUNTER      NAME: Kulwant Amin  AGE: 52 year old female  YOB: 1970  MRN: [id number]  EVALUATION DATE & TIME: 10/13/2022  8:14 AM    PCP: Adrien Cardoza    ED PROVIDER: Jevon Tellez M.D.      Chief Complaint   Patient presents with     Chest Pain     Shortness of Breath         FINAL IMPRESSION:  1. Exacerbation of asthma, unspecified asthma severity, unspecified whether persistent    2. Hyperglycemia          ED COURSE & MEDICAL DECISION MAKING:    Pertinent Labs & Imaging studies reviewed. (See chart for details)  ED Course as of 10/13/22 1326   Thu Oct 13, 2022   0827 Patient is a 52-year-old woman with history of organic fuel exposure of her lungs with subsequent asthma, here with cough, shortness of breath, chest pain for the past week, not improved with prednisone was started yesterday.  She is uncomfortable on exam but nontoxic-appearing.  Oxygenating at 97% with normal respiratory rate and temperature.  Blood pressure is 114 systolic.  Plan to screen for ACS, although symptoms most consistent with upper respiratory illness or COPD/asthma exacerbation.  She is already on prednisone, I will order DuoNeb, COVID swab, chest x-ray.  Screening EKG and troponin ordered.   0828 EKG shows sinus rhythm with a rate of 82.  Incomplete right bundle branch block.  No significant axis deviation.  Normal intervals.  When compared to prior EKG on September 6, 2022, there is no significant change.  Impression: Sinus rhythm, rate of 82, no acute ischemia   0932 Procalcitonin(!): 0.08   0932 WBC: 6.2   0932 Hemoglobin(!): 11.6   0939 Negative covid/influenza   0941 Glucose(!): 350  Hyperglycemia without DKA.  Normal bicarb and anion gap.   0958 Troponin T, High Sensitivity: <6   1012 Chest XR,  PA & LAT  Heart size and vascularity are normal. Shallow inspiration accentuates bibasilar subsegmental atelectasis. No focal consolidation, pneumothorax nor pleural effusion.   1027 Negative  infectious work-up, however given patient's mixed picture, COPD with asthma I think sending the patient home with doxycycline is reasonable.  She was given 3 duo nebs here, is already on prednisone.  She has asymptomatic hyperglycemia which is likely due to her prednisone, I told her to increase her morning subcu Lantus for the next few days while she is on the prednisone.  She will follow-up with her primary care doctor if she is having difficulty with her sugars, return here if she is hypoglycemic.         Medical Decision Making    Supplemental history from: Family Member    External Record(s) Reviewed: Outpatient Record: clinic notes    Differential Diagnosis: See MDM charting for differential considered.     I performed an independent interpretation of the: Other: cXR    Discussed with radiology regarding test interpretation: N/A    Discussion of management with another provider: N/A    The following testing was considered but ultimately not selected: None    I considered prescription management with: N/A    The patient's care impacted: None    Consideration of Admission/Observation: N/A    Care significantly affected by Social Determinants of Health including: N/A      Additional ED Course Timestamps:    8:18 AM I met with the patient to gather history and to perform my initial exam. I discussed the plan for care while in the Emergency Department. PPE (gloves, surgical mask) was worn during patient encounters.   10:12 AM I reevaluated patient.    At the conclusion of the encounter I discussed the results of all of the tests and the disposition. The questions were answered. The patient or family acknowledged understanding and was agreeable with the care plan.       MEDICATIONS GIVEN IN THE EMERGENCY:  Medications   ipratropium - albuterol 0.5 mg/2.5 mg/3 mL (DUONEB) neb solution 3 mL (3 mLs Nebulization Given 10/13/22 0859)   ipratropium - albuterol 0.5 mg/2.5 mg/3 mL (DUONEB) neb solution 3 mL (3 mLs  Nebulization Given 10/13/22 0859)   ipratropium - albuterol 0.5 mg/2.5 mg/3 mL (DUONEB) neb solution 3 mL (3 mLs Nebulization Given 10/13/22 1030)         NEW PRESCRIPTIONS STARTED AT TODAY'S ER VISIT  Discharge Medication List as of 10/13/2022 10:37 AM      START taking these medications    Details   doxycycline hyclate (VIBRAMYCIN) 100 MG capsule Take 1 capsule (100 mg) by mouth 2 times daily for 7 days, Disp-14 capsule, R-0, E-Prescribe                =================================================================    HPI    Patient information was obtained from: Patient and Family Member    Use of : Yes (In Person) - Language Pepito        Kulwant Amin is a 52 year old female with a pertinent history of TB lung, COPD, asthma, CAD, GERD, DM type II, anxiety, depression, and hypertension who presents to this ED for evaluation of chest pain. Patient states that she has a cough, chest pain, and wheezing that started ~1 week ago. Chest pain is tight in nature. Patient has a history of asthma and uses an inhaler to treat. Patient endorses some abdominal pain. Patient denies fever, leg swelling, sore throat, or sick contacts. Patient currently taking prednisone.    Per chart review, patient was seen on 10/12 (~1 day ago) at the [facility] for her asthma. Patient administered a duoneb in clinic. Advised to continue Breo Ellipta 1 puff daily, Spiriva, 3 times daily albuterol nebs, Singulair daily, Protonix 40 mg daily, Dupixent injections with increased dose, and as needed albuterol. Patient started on nystatin oral rinse QID and prednisone. Patient discharged in stable condition.       REVIEW OF SYSTEMS   Review of Systems   Constitutional: Negative for fever.   HENT: Negative for sore throat.    Respiratory: Positive for cough, chest tightness and wheezing.    Cardiovascular: Positive for chest pain. Negative for leg swelling.       PAST MEDICAL HISTORY:  Past Medical History:   Diagnosis Date      Acute asthma exacerbation 01/06/2020     Anxiety      Arthritis      Asthma exacerbation 11/19/2015     Chronic obstructive pulmonary disease, unspecified COPD type (H)      COPD (chronic obstructive pulmonary disease) (H)      Coronary artery disease due to lipid rich plaque      Depression      Epigastric pain 12/15/2021     Essential hypertension      GERD (gastroesophageal reflux disease)      Infection due to 2019 novel coronavirus 01/03/2022    positive with COVID-19 on January 3, 2022     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     Microcytic anemia 11/17/2019     S/P coronary artery stent placement 02/02/2018     TB lung, latent     9 mos INH       PAST SURGICAL HISTORY:  Past Surgical History:   Procedure Laterality Date     CORONARY STENT PLACEMENT  2018     CV CORONARY ANGIOGRAM N/A 1/25/2018    Procedure: Coronary Angiogram;  Surgeon: Angelo Serrano MD;  Location: [facility] Cath Lab;  Service:      CV CORONARY ANGIOGRAM N/A 5/5/2022    Procedure: Coronary Angiogram;  Surgeon: Irwin Cano MD;  Location: [facility] CATH LAB CV     CV CORONARY ANGIOGRAM  5/5/2022    Procedure: ;  Surgeon: Irwin Cano MD;  Location: [facility] CATH LAB CV     MD ESOPHAGOGASTRODUODENOSCOPY TRANSORAL DIAGNOSTIC N/A 12/10/2018    Procedure: ESOPHAGOGASTRODUODENOSCOPY (EGD);  Surgeon: Eddie Renteria MD;  Location: [facility];  Service: Gastroenterology     MD ESOPHAGOGASTRODUODENOSCOPY TRANSORAL DIAGNOSTIC N/A 12/3/2020    Procedure: ESOPHAGOGASTRODUODENOSCOPY (EGD) with biospies ;  Surgeon: Avi Crow MD;  Location: [facility];  Service: Gastroenterology     [facility] COLONOSCOPY W/WO BRUSH/WASH N/A 12/10/2018    Procedure: COLONOSCOPY with polypectomy using biopsy forceps;  Surgeon: Eddie Renteria MD;  Location: [facility];  Service: Gastroenterology           CURRENT MEDICATIONS:    No current facility-administered medications for this encounter.     Current Outpatient Medications   Medication      doxycycline hyclate (VIBRAMYCIN) 100 MG capsule     acetaminophen (TYLENOL) 500 MG tablet     albuterol (PROAIR HFA/PROVENTIL HFA/VENTOLIN HFA) 108 (90 Base) MCG/ACT inhaler     albuterol (PROVENTIL) (2.5 MG/3ML) 0.083% neb solution     ALLERGY RELIEF 10 MG tablet     amitriptyline (ELAVIL) 10 MG tablet     ASPIRIN LOW DOSE 81 MG EC tablet     atorvastatin (LIPITOR) 80 MG tablet     B-D U/F 31G X 8 MM insulin pen needle     Continuous Blood Gluc  (FREESTYLE MONICA 14 DAY READER) MICHAEL     Continuous Blood Gluc Sensor (FREESTYLE MONICA 14 DAY SENSOR) MISC     CONTOUR NEXT TEST test strip     cyclobenzaprine (FLEXERIL) 5 MG tablet     dextromethorphan (DELSYM) 30 MG/5ML liquid     diclofenac (VOLTAREN) 1 % topical gel     dupilumab (DUPIXENT) 300 MG/2ML prefilled pen     famotidine (PEPCID) 20 MG tablet     fluticasone-vilanterol (BREO ELLIPTA) 200-25 MCG/INH inhaler     gabapentin (NEURONTIN) 300 MG capsule     Global Inject Ease Lancets 30G MISC     guaiFENesin (ROBITUSSIN) 100 MG/5ML liquid     hydrochlorothiazide (MICROZIDE) 12.5 MG capsule     hydrocortisone (CORTAID) 1 % external cream     insulin aspart (NOVOLOG PEN) 100 UNIT/ML pen     insulin glargine (LANTUS PEN) 100 UNIT/ML pen     insulin pen needle (32G X 4 MM) 32G X 4 MM miscellaneous     ipratropium - albuterol 0.5 mg/2.5 mg/3 mL (DUONEB) 0.5-2.5 (3) MG/3ML neb solution     lidocaine (XYLOCAINE) 5 % external ointment     LORazepam (ATIVAN) 0.5 MG tablet     meclizine (ANTIVERT) 12.5 MG tablet     metFORMIN (GLUCOPHAGE) 1000 MG tablet     metoprolol tartrate (LOPRESSOR) 25 MG tablet     montelukast (SINGULAIR) 10 MG tablet     nystatin (MYCOSTATIN) 421199 UNIT/ML suspension     omeprazole (PRILOSEC) 40 MG DR capsule     Polyethylene Glycol 3350 (PEG 3350) 17 GM/SCOOP POWD     polyvinyl alcohol (ARTIFICIAL TEARS) 1.4 % ophthalmic solution     predniSONE (DELTASONE) 10 MG tablet     predniSONE (DELTASONE) 20 MG tablet     sucralfate (CARAFATE) 1 GM  "tablet     tiotropium (SPIRIVA RESPIMAT) 2.5 MCG/ACT inhalation aerosol       ALLERGIES:  No Known Allergies    FAMILY HISTORY:  Family History   Problem Relation Age of Onset     Other - See Comments Mother          of an intestinal problem     Ulcers Father          of gastritis     Breast Cancer No family hx of        SOCIAL HISTORY:   Social History     Socioeconomic History     Marital status:      Spouse name: None     Number of children: None     Years of education: None     Highest education level: None   Tobacco Use     Smoking status: Never     Smokeless tobacco: Never     Tobacco comments:     No passive exposure   Substance and Sexual Activity     Alcohol use: No     Drug use: No     Sexual activity: Yes     Partners: Male   Social History Narrative    2017 The patient lives with her daughter-in-law (who is present), , son, and 2 grandchildren (total of 6 people). [place].       VITALS:  /70   Pulse 115   Temp 98.2  F (36.8  C) (Oral)   Resp (!) 32   Ht 1.473 m (4' 10\")   Wt 57.2 kg (126 lb)   SpO2 97%   BMI 26.33 kg/m      PHYSICAL EXAM   Constitutional: Well developed, well nourished. Uncomfortable appearing.  HENT: Normocephalic, atraumatic, mucous membranes moist, nose normal. Neck- Supple, gross ROM intact.   Eyes: Pupils mid-range, conjunctiva without injection, no discharge.   Respiratory: Clear to auscultation bilaterally, no respiratory distress, no wheezing, speaks full sentences easily. Occasional dry cough.  Cardiovascular: Normal heart rate, regular rhythm, no murmurs.   GI: Soft, no tenderness to deep palpation in all quadrants, no masses.  Musculoskeletal: Moving all 4 extremities intentionally and without pain. No obvious deformity.  Skin: Warm, dry, no rash.  Neurologic: Alert & oriented x 3, cranial nerves grossly intact.  Psychiatric: Affect normal, cooperative.       LAB:  All pertinent labs reviewed and interpreted.  Labs Ordered and " Resulted from Time of ED Arrival to Time of ED Departure   CBC WITH PLATELETS - Abnormal       Result Value    WBC Count 6.2      RBC Count 4.57      Hemoglobin 11.6 (*)     Hematocrit 37.3      MCV 82      MCH 25.4 (*)     MCHC 31.1 (*)     RDW 15.3 (*)     Platelet Count 166     BASIC METABOLIC PANEL - Abnormal    Sodium 138      Potassium 4.3      Chloride 101      Carbon Dioxide (CO2) 23      Anion Gap 14      Urea Nitrogen 10.4      Creatinine 0.51      Calcium 9.1      Glucose 350 (*)     GFR Estimate >90     PROCALCITONIN - Abnormal    Procalcitonin 0.08 (*)    TROPONIN T, HIGH SENSITIVITY - Normal    Troponin T, High Sensitivity <6     INFLUENZA A/B & SARS-COV2 PCR MULTIPLEX - Normal    Influenza A PCR Negative      Influenza B PCR Negative      RSV PCR Negative      SARS CoV2 PCR Negative         RADIOLOGY:  Reviewed all pertinent imaging. Please see official radiology report.  Chest XR,  PA & LAT   Final Result   IMPRESSION: Heart size and vascularity are normal. Shallow inspiration accentuates bibasilar subsegmental atelectasis. No focal consolidation, pneumothorax nor pleural effusion.          EKG:    All EKG interpretations will be found in ED course above.      I, Gudelia Garza am serving as a scribe to document services personally performed by Dr. Jevon Tellez based on my observation and the provider's statements to me. I, Jevon Tellez MD attest that Gudelia Garza is acting in a scribe capacity, has observed my performance of the services and has documented them in accordance with my direction.    Jevon Tellez M.D.  Emergency Medicine  [facility] EMERGENCY DEPARTMENT  [facility] [facility] [address] [phone]  Dept: [phone]     Jevon Tellez MD  10/13/22 9745

## 2022-10-13 NOTE — ED TRIAGE NOTES
Pt come in for evaluation of cough, wheezing, sob and cp. Hx of asthma.     Triage Assessment     Row Name 10/13/22 8109       Triage Assessment (Adult)    Airway WDL WDL       Respiratory WDL    Respiratory WDL cough;rhythm/pattern    Rhythm/Pattern, Respiratory shortness of breath    Cough Frequency frequent    Cough Type dry       Cardiac WDL    Cardiac WDL chest pain       Chest Pain Assessment    Chest Pain Location anterior chest, left    Character tightness       Cognitive/Neuro/Behavioral WDL    Cognitive/Neuro/Behavioral WDL WDL

## 2022-10-13 NOTE — TELEPHONE ENCOUNTER
After Dr. Marie let us know the Dupixent is in fact covered and the pharmacy has tried multiple times to contact the family I was able to get Billy, the patient's daughter-in-law, on the phone at let her know. She will call the pharmacy to set up a delivery for this medication.

## 2022-10-13 NOTE — DISCHARGE INSTRUCTIONS
Please keep taking the prednisone that you are prescribed.  However, this does raise your blood sugar.  Your glucose was high here in the emergency department.  Because of this for the next few days I would like you to increase your morning dose of Lantus.  Instead of 40 I would like you to take 50 units.  Switch back to 40 units like you normally would take as soon as you are done taking the prednisone.  If you feel lightheaded, dizzy, recheck your glucose to make sure that it does not below 80.  This will be a sign that your insulin dosing is too high I would like you to be seen either in clinic same day or back here in the emergency department.    Please check in with your doctor early next week if you are continuing to have difficulty with controlling your sugars or with your breathing.

## 2022-10-19 ENCOUNTER — HOSPITAL ENCOUNTER (OUTPATIENT)
Dept: CARDIOLOGY | Facility: HOSPITAL | Age: 52
Discharge: HOME | End: 2022-10-19
Attending: INTERNAL MEDICINE | Admitting: INTERNAL MEDICINE
Payer: COMMERCIAL

## 2022-10-19 DIAGNOSIS — I47.10 SVT (SUPRAVENTRICULAR TACHYCARDIA) (H): ICD-10-CM

## 2022-10-19 PROCEDURE — 93270 REMOTE 30 DAY ECG REV/REPORT: CPT

## 2022-10-25 ENCOUNTER — OFFICE VISIT (OUTPATIENT)
Dept: FAMILY MEDICINE | Facility: CLINIC | Age: 52
End: 2022-10-25
Payer: COMMERCIAL

## 2022-10-25 VITALS
BODY MASS INDEX: 24.35 KG/M2 | SYSTOLIC BLOOD PRESSURE: 128 MMHG | HEIGHT: 58 IN | TEMPERATURE: 98.4 F | DIASTOLIC BLOOD PRESSURE: 72 MMHG | WEIGHT: 116 LBS | RESPIRATION RATE: 20 BRPM | HEART RATE: 98 BPM | OXYGEN SATURATION: 97 %

## 2022-10-25 DIAGNOSIS — I10 ESSENTIAL HYPERTENSION: ICD-10-CM

## 2022-10-25 DIAGNOSIS — J44.9 CHRONIC OBSTRUCTIVE PULMONARY DISEASE, UNSPECIFIED COPD TYPE (H): Chronic | ICD-10-CM

## 2022-10-25 DIAGNOSIS — Z00.00 ANNUAL PHYSICAL EXAM: Primary | ICD-10-CM

## 2022-10-25 DIAGNOSIS — Z86.73 H/O ARTERIAL ISCHEMIC STROKE: Chronic | ICD-10-CM

## 2022-10-25 DIAGNOSIS — F41.9 ANXIETY: ICD-10-CM

## 2022-10-25 DIAGNOSIS — F33.9 RECURRENT MAJOR DEPRESSIVE DISORDER, REMISSION STATUS UNSPECIFIED (H): Chronic | ICD-10-CM

## 2022-10-25 DIAGNOSIS — E11.9 TYPE 2 DIABETES MELLITUS TREATED WITH INSULIN (H): ICD-10-CM

## 2022-10-25 DIAGNOSIS — Z79.4 TYPE 2 DIABETES MELLITUS TREATED WITH INSULIN (H): ICD-10-CM

## 2022-10-25 PROCEDURE — 91313 COVID-19,PF,MODERNA BIVALENT: CPT | Performed by: FAMILY MEDICINE

## 2022-10-25 PROCEDURE — 99396 PREV VISIT EST AGE 40-64: CPT | Mod: 25 | Performed by: FAMILY MEDICINE

## 2022-10-25 PROCEDURE — 0134A COVID-19,PF,MODERNA BIVALENT: CPT | Performed by: FAMILY MEDICINE

## 2022-10-25 PROCEDURE — 90682 RIV4 VACC RECOMBINANT DNA IM: CPT | Performed by: FAMILY MEDICINE

## 2022-10-25 PROCEDURE — 90471 IMMUNIZATION ADMIN: CPT | Performed by: FAMILY MEDICINE

## 2022-10-25 RX ORDER — FLUOXETINE 10 MG/1
10 CAPSULE ORAL DAILY
Qty: 90 CAPSULE | Refills: 1 | Status: SHIPPED | OUTPATIENT
Start: 2022-10-25 | End: 2023-03-31

## 2022-10-25 ASSESSMENT — ENCOUNTER SYMPTOMS
COUGH: 1
FREQUENCY: 1
ABDOMINAL PAIN: 0
EYE PAIN: 0
PARESTHESIAS: 0
HEARTBURN: 1
NERVOUS/ANXIOUS: 1
CHILLS: 0
SORE THROAT: 1
JOINT SWELLING: 0
PALPITATIONS: 1
SHORTNESS OF BREATH: 1
MYALGIAS: 1
NAUSEA: 0
BREAST MASS: 0
HEMATURIA: 0
ARTHRALGIAS: 1
FEVER: 0
CONSTIPATION: 1
DYSURIA: 0
WEAKNESS: 1
DIARRHEA: 0
HEADACHES: 1
DIZZINESS: 1
HEMATOCHEZIA: 1

## 2022-10-25 ASSESSMENT — PATIENT HEALTH QUESTIONNAIRE - PHQ9
SUM OF ALL RESPONSES TO PHQ QUESTIONS 1-9: 14
10. IF YOU CHECKED OFF ANY PROBLEMS, HOW DIFFICULT HAVE THESE PROBLEMS MADE IT FOR YOU TO DO YOUR WORK, TAKE CARE OF THINGS AT HOME, OR GET ALONG WITH OTHER PEOPLE: NOT DIFFICULT AT ALL
SUM OF ALL RESPONSES TO PHQ QUESTIONS 1-9: 14

## 2022-10-25 NOTE — PROGRESS NOTES
"   SUBJECTIVE:   CC: Kulwant is an 52 year old who presents for preventive health visit.     {Split Bill scripting  The purpose of this visit is to discuss your medical history and prevent health problems before you are sick. You may be responsible for a co-pay, coinsurance, or deductible if your visit today includes services such as checking on a sore throat, having an x-ray or lab test, or treating and evaluating a new or existing condition :  Patient has been advised of split billing requirements and indicates understanding: Yes   Most recent ED visit on 10/13/22 due to SOB/Wheezing. Was given Doxy for 7 days, completed. Still coughing at night,but appear to be back at her base line. Pulmonology follow up scheduled in 3 days.  No fever since ED visit.    Wants \" something for anxiety \". Was given Ativan 0.5 mg BID prn in 7/22. States it helps. No recent panic attacks.     Healthy Habits:     Getting at least 3 servings of Calcium per day:  Yes    Bi-annual eye exam:  Yes    Dental care twice a year:  Yes    Sleep apnea or symptoms of sleep apnea:  Sleep apnea    Diet:  Low salt and Diabetic    Frequency of exercise:  2-3 days/week    Duration of exercise:  Less than 15 minutes    Taking medications regularly:  Yes    Medication side effects:  None    PHQ-2 Total Score: 2    Additional concerns today:  Yes              Today's PHQ-2 Score:   PHQ-2 ( 1999 [employer]) 10/25/2022   Q1: Little interest or pleasure in doing things 1   Q2: Feeling down, depressed or hopeless 1   PHQ-2 Score 2   Q1: Little interest or pleasure in doing things Several days   Q2: Feeling down, depressed or hopeless Several days   PHQ-2 Score 2       Abuse: Current or Past (Physical, Sexual or Emotional) - No  Do you feel safe in your environment? Yes        Social History     Tobacco Use     Smoking status: Never     Smokeless tobacco: Never     Tobacco comments:     No passive exposure   Substance Use Topics     Alcohol use: No     If you drink " "alcohol do you typically have >3 drinks per day or >7 drinks per week? No    Alcohol Use 10/25/2022   Prescreen: >3 drinks/day or >7 drinks/week? No   No flowsheet data found.        Breast Cancer Screening:    Breast CA Risk Assessment (FHS-7) 10/25/2022   Do you have a family history of breast, colon, or ovarian cancer? No / Unknown         Mammogram Screening: Recommended mammography every 1-2 years with patient discussion and risk factor consideration  Pertinent mammograms are reviewed under the imaging tab.    History of abnormal Pap smear: NO - age 30-65 PAP every 5 years with negative HPV co-testing recommended  PAP / HPV 8/31/2017 2/19/2014   PAP Negative for squamous intraepithelial lesion or malignancy  Electronically signed by Marlene Yeh CT (ASCP) on 9/13/2017 at  3:55 PM   -   PAP (Historical) - NIL     Reviewed and updated as needed this visit by clinical staff   Tobacco  Allergies  Meds   Med Hx  Surg Hx  Fam Hx  Soc Hx        Reviewed and updated as needed this visit by Provider                     Review of Systems  CONSTITUTIONAL: NEGATIVE for fever, chills, change in weight  BREAST: NEGATIVE for masses, tenderness or discharge  : NEGATIVE for unusual urinary or vaginal symptoms. Periods are regular.     OBJECTIVE:   /72 (BP Location: Right arm, Patient Position: Sitting, Cuff Size: Adult Regular)   Pulse 98   Temp 98.4  F (36.9  C) (Oral)   Resp 20   Ht 1.473 m (4' 10\")   Wt 52.6 kg (116 lb)   SpO2 97%   BMI 24.24 kg/m    Physical Exam  GENERAL: healthy, alert and no distress  NECK: no adenopathy, no asymmetry, masses, or scars and thyroid normal to palpation  RESP: Coarse breath sounds with mild wheezing bilaterally.  No crackles or rhonchi.  CV: regular rate and rhythm, normal S1 S2, no S3 or S4, no murmur, click or rub, no peripheral edema and peripheral pulses strong  ABDOMEN: soft, nontender, no hepatosplenomegaly, no masses and bowel sounds normal  MS: no gross " "musculoskeletal defects noted, no edema  Diabetic foot exam: no trophic changes or ulcerative lesions and normal sensory exam        ASSESSMENT/PLAN:   Annual physical exam  Declined Pap smear.    Anxiety  Patient wants Ativan.  Discussed the nature of habit-forming with Ativan.  Recommended daily medication instead and patient agreed.  We will try Prozac 10 mg.    Follow-up in December 2022.  FLUoxetine (PROZAC) 10 MG capsule; Take 1 capsule (10 mg) by mouth daily    Recurrent major depressive disorder, remission status unspecified (H)  No suicidal homicidal ideation.  Medication as above.    Type 2 diabetes mellitus treated with insulin (H)  Blood sugar numbers are elevated, likely due to recent steroid use.  She will continue to monitor fasting blood sugars and 2 hours postprandial.  We will titrate insulin up if needed.  Follow-up in 12/22.    H/O arterial ischemic stroke  As usual symptoms.  Has neurology appointment scheduled.    Essential hypertension  Controlled    Chronic obstructive pulmonary disease, unspecified COPD type (H)  Managed by pulmonology.  Appointment scheduled in 3 days.          COUNSELING:  Reviewed preventive health counseling, as reflected in patient instructions       Healthy diet/nutrition    Estimated body mass index is 24.24 kg/m  as calculated from the following:    Height as of this encounter: 1.473 m (4' 10\").    Weight as of this encounter: 52.6 kg (116 lb).        She reports that she has never smoked. She has never used smokeless tobacco.      Counseling Resources:  ATP IV Guidelines  Pooled Cohorts Equation Calculator  Breast Cancer Risk Calculator  BRCA-Related Cancer Risk Assessment: FHS-7 Tool  FRAX Risk Assessment  ICSI Preventive Guidelines  Dietary Guidelines for Americans, 2010  [employer]'s MyPlate  ASA Prophylaxis  Lung CA Screening    Adrien Cardoza MD  [facility]  Answers for HPI/ROS submitted by the patient on 10/25/2022  If you checked off any problems, " how difficult have these problems made it for you to do your work, take care of things at home, or get along with other people?: Not difficult at all  PHQ9 TOTAL SCORE: 14

## 2022-10-28 ENCOUNTER — OFFICE VISIT (OUTPATIENT)
Dept: PULMONOLOGY | Facility: OTHER | Age: 52
End: 2022-10-28
Payer: COMMERCIAL

## 2022-10-28 VITALS
HEART RATE: 68 BPM | DIASTOLIC BLOOD PRESSURE: 72 MMHG | OXYGEN SATURATION: 99 % | WEIGHT: 120 LBS | SYSTOLIC BLOOD PRESSURE: 116 MMHG | BODY MASS INDEX: 25.08 KG/M2

## 2022-10-28 DIAGNOSIS — R06.02 SOB (SHORTNESS OF BREATH): ICD-10-CM

## 2022-10-28 DIAGNOSIS — Z91.09 ENVIRONMENTAL ALLERGIES: ICD-10-CM

## 2022-10-28 DIAGNOSIS — J45.51 SEVERE PERSISTENT ASTHMA WITH EXACERBATION (H): Primary | ICD-10-CM

## 2022-10-28 DIAGNOSIS — B37.0 ORAL THRUSH: ICD-10-CM

## 2022-10-28 PROCEDURE — 99213 OFFICE O/P EST LOW 20 MIN: CPT | Performed by: NURSE PRACTITIONER

## 2022-10-28 RX ORDER — FLUCONAZOLE 150 MG/1
150 TABLET ORAL DAILY
Qty: 7 TABLET | Refills: 0 | Status: SHIPPED | OUTPATIENT
Start: 2022-10-28 | End: 2022-11-04

## 2022-10-28 RX ORDER — PREDNISONE 5 MG/1
TABLET ORAL
Qty: 21 TABLET | Refills: 0 | Status: SHIPPED | OUTPATIENT
Start: 2022-10-28 | End: 2022-11-11

## 2022-10-28 RX ORDER — NYSTATIN 100000/ML
500000 SUSPENSION, ORAL (FINAL DOSE FORM) ORAL 4 TIMES DAILY
Qty: 473 ML | Refills: 1 | Status: SHIPPED | OUTPATIENT
Start: 2022-10-28 | End: 2023-05-16

## 2022-10-28 NOTE — PATIENT INSTRUCTIONS
- let's have you add a pill to the thrush management.   - let's have you take another few weeks of low dose prednisone.     If you have worsening symptoms, questions, or need to speak with the nurse please call [phone].

## 2022-10-28 NOTE — PROGRESS NOTES
Pulmonary Outpatient Clinic Follow Up  October 12, 2022        Assessment/Plan:    52 y.o.-year-old woman with history of organic fuel exposure in the home was previously had nondiagnostic PFTs.  She was previously evaluated by Dr. Marie and initiated on Dupixent for her hypereosinophilia with some response to this, however, due to insurance issues she did not have this for over 2 months, she re-started this about a week ago but is still reporting severe cough, wheeze, and SOB with activity. She is also reporting symptoms of thrush since her last visit that did not improve with nystatin oral rinse. Her hypereosinophilia evaluation has been unremarkable.  For the present we would recommend;    Prednisone 10mg x 7 days, then 5mg x 7 days    Continue Breo Ellipta 1 puff daily.  She knows to gargle after using this.    Continue Spiriva.    Continue to use 3 times daily albuterol nebs.    Continue Singulair daily     Continue Protonix 40 mg daily.    As needed albuterol for rescue.    Start nystatin oral rinse QID, swish and swallow until symptoms have resolved, then an additional 2 days.     Fluconazole daily x 7 days    Has received both doses of her Covid-19 vaccine, UTD on booster    Has had seasonal flu vaccine    Continue Tums as needed for gastric upset.     Continue follow up with Dr. Marie's office and remain on Dupixent.     Return to clinic in 4 weeks, sooner if needed.     Jenn Mike, CNP  Pulmonary Medicine  [facility]   [phone]        Subjective:   Patient ID: Ms. Amin is a 52 y.o.female with a past medical history significant for anxiety, arthritis, questionable asthma versus COPD, diabetes, hypertension and a prior history of SVT presents with a follow-up visit for her pulmonary complaints.  She follows with me fairly closely for her moderate-persistent, difficult to control asthma symptoms.   She describes no change since her last visit. Still having cough, SOB, wheeze, and chest tightness.  Cough varies and it both productive and dry at times. Symptoms do not change throughout the day. She wakes at every night coughing.  Of note, she ran out of Dupixent for a few months and recently re-started this about a week ago. She reports some relief from her last prednisone burst.   Using abuterol nebs TID, Spiriva daily.   Breo daily, she has been rinsing her mouth after each use but is reporting mouth pain with white patches and sores that has made it difficult to eat for the past month. No improvement with nystatin, her daughter states she has been using this QID.     ACT Total Scores 7/14/2022 8/26/2022 10/12/2022   ACT TOTAL SCORE - - -   ASTHMA ER VISITS - - -   ASTHMA HOSPITALIZATIONS - - -   ACT TOTAL SCORE (Goal Greater than or Equal to 20) 7 12 7   In the past 12 months, how many times did you visit the emergency room for your asthma without being admitted to the hospital? 2 1 3   In the past 12 months, how many times were you hospitalized overnight because of your asthma? 2 0 3         Pertinent past medical history:  52-year-old female here for follow-up.  Her breathing is a bit worse than 6 months ago.  She had multiple ER visits and evaluations.  This includes negative PE study.  She had a cath with a 75% LAD lesion which was intervened upon.  Since her catheterization she feels she has more heartburn.  Her breathing is worse with exertion.  Improves with rest.  It is also worse with cold weather.  Warm weather and humid air does not bother.  Symptoms localized to the chest. Pertinent negatives include no fever or hemoptysis.    Current Outpatient Medications   Medication Sig Dispense Refill     acetaminophen (TYLENOL) 500 MG tablet TAKE 1 PILL BY MOUTH EVERY 6 HOURS AS NEEDED FOR PAIN 100 tablet 10     albuterol (PROAIR HFA/PROVENTIL HFA/VENTOLIN HFA) 108 (90 Base) MCG/ACT inhaler Inhale 2 puffs into the lungs every 4 hours as needed for shortness of breath / dyspnea 4 g 11     albuterol  (PROVENTIL) (2.5 MG/3ML) 0.083% neb solution Take 1 vial (2.5 mg) by nebulization every 6 hours as needed 90 mL 11     ALLERGY RELIEF 10 MG tablet TAKE 1 TABLET (10 MG TOTAL) BY MOUTH DAILY FOR ALLERGIES 30 tablet 3     amitriptyline (ELAVIL) 10 MG tablet TAKE 2 TABLETS (20 MG TOTAL) BY MOUTH AT BEDTIME. 180 tablet 3     ASPIRIN LOW DOSE 81 MG EC tablet TAKE 1 TABLET (81 MG TOTAL) BY MOUTH DAILY. 90 tablet 3     atorvastatin (LIPITOR) 80 MG tablet TAKE 1 TABLET (80 MG TOTAL) BY MOUTH AT BEDTIME FOR CHOLESTEROL 90 tablet 3     B-D U/F 31G X 8 MM insulin pen needle USE ONE PEN NEEDLE DAILY AS NEEDED FOR  each 1     Continuous Blood Gluc  (FREESTYLE MONICA 14 DAY READER) MICHAEL Uses daily       Continuous Blood Gluc Sensor (FREESTYLE MONICA 14 DAY SENSOR) MISC USE 1 UNITS AS DIRECTED EVERY 14 (FOURTEEN) DAYS. 2 each 3     CONTOUR NEXT TEST test strip USE 1 EACH AS DIRECTED 3 (THREE) TIMES A DAY. 50 strip 11     cyclobenzaprine (FLEXERIL) 5 MG tablet TAKE 1-2 TABLETS (5-10 MG) BY MOUTH 3 TIMES DAILY AS NEEDED FOR MUSCLE SPASMS 30 tablet 3     dextromethorphan (DELSYM) 30 MG/5ML liquid Take 10 mLs (60 mg) by mouth 2 times daily as needed for cough 178 mL 0     diclofenac (VOLTAREN) 1 % topical gel Apply 4 g topically 4 times daily 350 g 3     dupilumab (DUPIXENT) 300 MG/2ML prefilled pen Inject 2 mLs (300 mg) Subcutaneous every 14 days 4 mL 3     famotidine (PEPCID) 20 MG tablet Take 1 tablet (20 mg) by mouth 2 times daily 90 tablet 3     fluconazole (DIFLUCAN) 150 MG tablet Take 1 tablet (150 mg) by mouth daily for 7 doses 7 tablet 0     FLUoxetine (PROZAC) 10 MG capsule Take 1 capsule (10 mg) by mouth daily 90 capsule 1     fluticasone-vilanterol (BREO ELLIPTA) 200-25 MCG/INH inhaler Inhale 1 puff into the lungs daily 60 each 11     gabapentin (NEURONTIN) 300 MG capsule Take 2 capsules (600 mg) by mouth 3 times daily 180 capsule 3     Global Inject Ease Lancets 30G MISC USE 1 EACH AS DIRECTED 3 (THREE) TIMES  A DAY. DISPENSE BRAND PER PATIENT'S INSURANCE AT PHARMACY DISCRETION.(E11.9) 100 each 3     guaiFENesin (ROBITUSSIN) 100 MG/5ML liquid Take 10 mLs (200 mg) by mouth every 4 hours as needed for cough 200 mL 1     hydrochlorothiazide (MICROZIDE) 12.5 MG capsule TAKE 1 CAPSULE (12.5 MG TOTAL) BY MOUTH DAILY FOR BLOOD PRESSURE 90 capsule 3     hydrocortisone (CORTAID) 1 % external cream Apply topically 2 times daily 60 g 0     insulin aspart (NOVOLOG PEN) 100 UNIT/ML pen 10 U BID before meals 15 mL 4     insulin glargine (LANTUS PEN) 100 UNIT/ML pen Inject 40 Units Subcutaneous every morning 45 mL 3     insulin pen needle (32G X 4 MM) 32G X 4 MM miscellaneous Use one pen needles daily or as directed. 200 each 11     ipratropium - albuterol 0.5 mg/2.5 mg/3 mL (DUONEB) 0.5-2.5 (3) MG/3ML neb solution Take 1 vial (3 mLs) by nebulization every 6 hours as needed for shortness of breath / dyspnea or wheezing 360 mL 11     lidocaine (XYLOCAINE) 5 % external ointment Apply topically 3 times daily as needed Small amount (finger tip amount) to chest tid prn pain 35.44 g 0     meclizine (ANTIVERT) 12.5 MG tablet Take 2 tablets (25 mg) by mouth 3 times daily as needed for dizziness 30 tablet 0     metFORMIN (GLUCOPHAGE) 1000 MG tablet Take 1 tablet (1,000 mg) by mouth 2 times daily (with meals) 180 tablet 1     metoprolol tartrate (LOPRESSOR) 25 MG tablet TAKE 1 TABLET (25 MG TOTAL) BY MOUTH 2 (TWO) TIMES A DAY FOR BLOOD PRESSURE 180 tablet 3     montelukast (SINGULAIR) 10 MG tablet Take 1 tablet (10 mg) by mouth At Bedtime 30 tablet 6     nystatin (MYCOSTATIN) 011572 UNIT/ML suspension Take 5 mLs (500,000 Units) by mouth 4 times daily 473 mL 1     omeprazole (PRILOSEC) 40 MG DR capsule Take 1 capsule (40 mg) by mouth daily 90 capsule 3     Polyethylene Glycol 3350 (PEG 3350) 17 GM/SCOOP POWD MIX 17 GRAMS WITH LIQUID AND DRINK ONCE DAILY FOR CONSTIPATION 510 g 12     polyvinyl alcohol (ARTIFICIAL TEARS) 1.4 % ophthalmic solution  Place 1 drop into both eyes as needed for dry eyes 15 mL 3     predniSONE (DELTASONE) 20 MG tablet Take 1 tablet (20 mg) by mouth 2 times daily 10 tablet 0     predniSONE (DELTASONE) 5 MG tablet Take 2 tablets (10 mg) by mouth daily for 7 days, THEN 1 tablet (5 mg) daily for 7 days. 21 tablet 0     sucralfate (CARAFATE) 1 GM tablet TAKE 1 TABLET (1 G TOTAL) BY MOUTH 4 (FOUR) TIMES A DAY BEFORE MEALS AND AT BEDTIME. TAKE 1 HOUR PRIOR TO MEALS 120 tablet 11     tiotropium (SPIRIVA RESPIMAT) 2.5 MCG/ACT inhalation aerosol Inhale 2 puffs into the lungs daily 4 g 1     Social history:  Lives in a house built in 1963; no concern for mold in the house.  7 People live in the house including 2 children.  There are no pets in the house.  She is a never smoker and there is no second hand exposure to smoke.  She does not drink alcoholic beverages and denies indulging in recreational drugs.    Physical Exam   /72 (BP Location: Left arm, Patient Position: Chair, Cuff Size: Adult Regular)   Pulse 68   Wt 54.4 kg (120 lb)   SpO2 99%   BMI 25.08 kg/m    Physical Exam  Physical Exam  Constitutional:       General: She is not in acute distress.     Appearance: She is not ill-appearing or diaphoretic.   HENT:      Nose: Nose normal.   Cardiovascular:      Rate and Rhythm: Normal rate and regular rhythm.      Pulses: Normal pulses.      Heart sounds: Normal heart sounds.   Pulmonary:      Effort: Tachypnea present. No respiratory distress.      Breath sounds: No stridor. Wheezing (coarse, throughout. inspiratory and expiratory) present.      Comments: Active dry cough during exam. Audible wheezes while breathing.   Skin:     General: Skin is warm and dry.      Findings: No rash.   Neurological:      Mental Status: She is alert.   Psychiatric:         Behavior: Behavior normal.          Latest Reference Range & Units 02/07/22 15:47   Neutrophil Cytoplasmic Antibody <1:10  <1:10   Neutrophil Cytoplasmic Antibody Pattern  The  ANCA IFA is <1:10.  No further testing will be performed.      Latest Reference Range & Units 02/07/22 15:47   Schistosoma Ab IgG Negative  Negative [1]   Strongyloides Bere IgG <=0.9 IV 0.4 [2]      Latest Reference Range & Units 02/07/22 15:47   Immunoglobulin E <=214 kU/L 74 [1]   Allergen Fungimold A Fumigatus IgE <=0.34 kU/L <0.10 [2]   Aspergillus Fumagatis 1 Antibody None Detected  None Detected [3]   Aspergillus Fumagatis 6 Antibody None Detected  None Detected [4]   Allergen, Interp, Immunocap Score IgE  See Note [5]       EXAM: CT CHEST W/O CONTRAST  LOCATION: [facility]  DATE/TIME: 7/11/2022 11:28 PM  INDICATION: Chest pain.  COMPARISON: 5/4/2022.  TECHNIQUE: CT chest without IV contrast. Multiplanar reformats were obtained. Dose reduction techniques were used.  CONTRAST: None.  FINDINGS:   LUNGS AND PLEURA: There is atelectasis and scarring at the lung bases. Scarring at the lung apices. Mild dependent atelectasis bilaterally. Subpleural calcified granuloma or calcified plaque in the right lower lobe laterally. Stable 2 mm nodule in the   right upper lobe posteriorly. Stable 3 mm nodule in the lingula laterally. Tiny nodule along the left major fissure laterally is stable. No pneumothorax or pleural effusion.  MEDIASTINUM/AXILLAE: No lymph node enlargement. Central airways are unremarkable. The heart size is normal.  CORONARY ARTERY CALCIFICATION: Previous intervention (stents or CABG).  UPPER ABDOMEN: No acute upper abdominal abnormality.  MUSCULOSKELETAL: Unremarkable.                                                                  IMPRESSION:   1.  No acute abnormality. No cause of chest pain is evident.  2.  A few small lung nodules without significant change.  Recommendations for one or multiple incidental lung nodules < 6mm :    Low risk patients: No routine follow-up.    High risk patients: Optional follow-up CT at 12 months; if unchanged, no further follow-up.    ECHO  5/4/2022  Interpretation Summary  Left ventricular size, wall motion and function are normal. The ejection  fraction is 55-60%.  There is borderline anterior, septal, and apical wall hypokinesis.  Normal right ventricle size and systolic function.  No hemodynamically significant valvular abnormalities on 2D or color flow  imaging.

## 2022-11-03 PROCEDURE — 93272 ECG/REVIEW INTERPRET ONLY: CPT | Performed by: INTERNAL MEDICINE

## 2022-11-07 DIAGNOSIS — I47.10 SVT (SUPRAVENTRICULAR TACHYCARDIA) (H): Primary | ICD-10-CM

## 2022-11-07 DIAGNOSIS — I25.83 CORONARY ARTERY DISEASE DUE TO LIPID RICH PLAQUE: ICD-10-CM

## 2022-11-07 DIAGNOSIS — I25.10 CORONARY ARTERY DISEASE DUE TO LIPID RICH PLAQUE: ICD-10-CM

## 2022-11-07 DIAGNOSIS — E78.5 DYSLIPIDEMIA: ICD-10-CM

## 2022-11-14 ENCOUNTER — OFFICE VISIT (OUTPATIENT)
Dept: PULMONOLOGY | Facility: OTHER | Age: 52
End: 2022-11-14
Payer: COMMERCIAL

## 2022-11-14 VITALS
BODY MASS INDEX: 25.35 KG/M2 | SYSTOLIC BLOOD PRESSURE: 106 MMHG | WEIGHT: 121.31 LBS | HEART RATE: 85 BPM | DIASTOLIC BLOOD PRESSURE: 64 MMHG | OXYGEN SATURATION: 98 %

## 2022-11-14 DIAGNOSIS — J45.50 SEVERE PERSISTENT ASTHMA WITHOUT COMPLICATION (H): ICD-10-CM

## 2022-11-14 PROCEDURE — 99214 OFFICE O/P EST MOD 30 MIN: CPT | Performed by: INTERNAL MEDICINE

## 2022-11-14 ASSESSMENT — ASTHMA QUESTIONNAIRES
QUESTION_1 LAST FOUR WEEKS HOW MUCH OF THE TIME DID YOUR ASTHMA KEEP YOU FROM GETTING AS MUCH DONE AT WORK, SCHOOL OR AT HOME: A LITTLE OF THE TIME
ACT_TOTALSCORE: 10
ACT_TOTALSCORE: 10
EMERGENCY_ROOM_LAST_YEAR_TOTAL: THREE OR MORE
QUESTION_4 LAST FOUR WEEKS HOW OFTEN HAVE YOU USED YOUR RESCUE INHALER OR NEBULIZER MEDICATION (SUCH AS ALBUTEROL): THREE OR MORE TIMES PER DAY
QUESTION_5 LAST FOUR WEEKS HOW WOULD YOU RATE YOUR ASTHMA CONTROL: POORLY CONTROLLED
QUESTION_3 LAST FOUR WEEKS HOW OFTEN DID YOUR ASTHMA SYMPTOMS (WHEEZING, COUGHING, SHORTNESS OF BREATH, CHEST TIGHTNESS OR PAIN) WAKE YOU UP AT NIGHT OR EARLIER THAN USUAL IN THE MORNING: FOUR OR MORE NIGHTS A WEEK
QUESTION_2 LAST FOUR WEEKS HOW OFTEN HAVE YOU HAD SHORTNESS OF BREATH: ONCE A DAY
HOSPITALIZATION_OVERNIGHT_LAST_YEAR_TOTAL: THREE OR MORE

## 2022-11-14 NOTE — PROGRESS NOTES
Pulmonary Outpatient Clinic Follow Up    Assessment/Plan:    52 y.o.-year-old woman with history of organic fuel exposure in the home was previously had nondiagnostic PFTs.  She was previously evaluated by Dr. Marie and initiated on Dupixent for her hypereosinophilia with some response to this, however, due to insurance issues she did not have this for over 2 months, she re-started this about a week ago but is still reporting severe cough, wheeze, and SOB with activity. She is also reporting symptoms of thrush since her last visit that did not improve with nystatin oral rinse. Her hypereosinophilia evaluation has been unremarkable.  For the present we would recommend;    Continue Breo Ellipta 1 puff daily.  She knows to gargle after using this.    Continue Spiriva.    Continue to use 3 times daily albuterol nebs.    Continue Singulair daily     Continue Protonix 40 mg daily.    As needed albuterol for rescue.    Has received both doses of her Covid-19 vaccine, UTD on booster    Has had seasonal flu vaccine    Continue Tums as needed for gastric upset.     Continue follow up with Dr. Marie's office and remain on Dupixent. Will send Dr. Marie a note to refill her Dupixent.    Return to clinic in 6 weeks, sooner if needed.     Tamra Duong MD  Pulmonary and Critical Care  (p) [phone]    Subjective:   Patient ID: Ms. Amin is a 52 y.o.female with a past medical history significant for anxiety, arthritis, questionable asthma versus COPD, diabetes, hypertension and a prior history of SVT presents with a follow-up visit for her pulmonary complaints.  She follows with me fairly closely for her moderate-persistent, difficult to control asthma symptoms.   Since her last clinic visit, she has used her Dupixent twice and has no more medicine at [facility] (this is delivered at home and there is only one more refill on her prescription). Slight improvement in wheezes noticed by patient and daughter-in-law. She is still coughing  but continues to use her Breo, spiriva and albuterol medications TID.  She had reported thrush like symptoms at the last clinic visit and these have improved somewhat.    ACT Total Scores 8/26/2022 10/12/2022 11/14/2022   ACT TOTAL SCORE - - -   ASTHMA ER VISITS - - -   ASTHMA HOSPITALIZATIONS - - -   ACT TOTAL SCORE (Goal Greater than or Equal to 20) 12 7 10   In the past 12 months, how many times did you visit the emergency room for your asthma without being admitted to the hospital? 1 3 3   In the past 12 months, how many times were you hospitalized overnight because of your asthma? 0 3 3         Pertinent past medical history:  52-year-old female here for follow-up.  Her breathing is a bit worse than 6 months ago.  She had multiple ER visits and evaluations.  This includes negative PE study.  She had a cath with a 75% LAD lesion which was intervened upon.  Since her catheterization she feels she has more heartburn.  Her breathing is worse with exertion.  Improves with rest.  It is also worse with cold weather.  Warm weather and humid air does not bother.  Symptoms localized to the chest. Pertinent negatives include no fever or hemoptysis.    Current Outpatient Medications   Medication Sig Dispense Refill     acetaminophen (TYLENOL) 500 MG tablet TAKE 1 PILL BY MOUTH EVERY 6 HOURS AS NEEDED FOR PAIN 100 tablet 10     albuterol (PROAIR HFA/PROVENTIL HFA/VENTOLIN HFA) 108 (90 Base) MCG/ACT inhaler Inhale 2 puffs into the lungs every 4 hours as needed for shortness of breath / dyspnea 4 g 11     albuterol (PROVENTIL) (2.5 MG/3ML) 0.083% neb solution Take 1 vial (2.5 mg) by nebulization every 6 hours as needed 90 mL 11     ALLERGY RELIEF 10 MG tablet TAKE 1 TABLET (10 MG TOTAL) BY MOUTH DAILY FOR ALLERGIES 30 tablet 3     amitriptyline (ELAVIL) 10 MG tablet TAKE 2 TABLETS (20 MG TOTAL) BY MOUTH AT BEDTIME. 180 tablet 3     ASPIRIN LOW DOSE 81 MG EC tablet TAKE 1 TABLET (81 MG TOTAL) BY MOUTH DAILY. 90 tablet 3      atorvastatin (LIPITOR) 80 MG tablet TAKE 1 TABLET (80 MG TOTAL) BY MOUTH AT BEDTIME FOR CHOLESTEROL 90 tablet 3     B-D U/F 31G X 8 MM insulin pen needle USE ONE PEN NEEDLE DAILY AS NEEDED FOR  each 1     Continuous Blood Gluc  (FREESTYLE MONICA 14 DAY READER) MICHAEL Uses daily       Continuous Blood Gluc Sensor (FREESTYLE MONICA 14 DAY SENSOR) MISC USE 1 UNITS AS DIRECTED EVERY 14 (FOURTEEN) DAYS. 2 each 3     CONTOUR NEXT TEST test strip USE 1 EACH AS DIRECTED 3 (THREE) TIMES A DAY. 50 strip 11     cyclobenzaprine (FLEXERIL) 5 MG tablet TAKE 1-2 TABLETS (5-10 MG) BY MOUTH 3 TIMES DAILY AS NEEDED FOR MUSCLE SPASMS 30 tablet 3     dextromethorphan (DELSYM) 30 MG/5ML liquid Take 10 mLs (60 mg) by mouth 2 times daily as needed for cough 178 mL 0     diclofenac (VOLTAREN) 1 % topical gel Apply 4 g topically 4 times daily 350 g 3     dupilumab (DUPIXENT) 300 MG/2ML prefilled pen Inject 2 mLs (300 mg) Subcutaneous every 14 days 4 mL 3     famotidine (PEPCID) 20 MG tablet Take 1 tablet (20 mg) by mouth 2 times daily 90 tablet 3     FLUoxetine (PROZAC) 10 MG capsule Take 1 capsule (10 mg) by mouth daily 90 capsule 1     fluticasone-vilanterol (BREO ELLIPTA) 200-25 MCG/INH inhaler Inhale 1 puff into the lungs daily 60 each 11     gabapentin (NEURONTIN) 300 MG capsule Take 2 capsules (600 mg) by mouth 3 times daily 180 capsule 3     Global Inject Ease Lancets 30G MISC USE 1 EACH AS DIRECTED 3 (THREE) TIMES A DAY. DISPENSE BRAND PER PATIENT'S INSURANCE AT PHARMACY DISCRETION.(E11.9) 100 each 3     guaiFENesin (ROBITUSSIN) 100 MG/5ML liquid Take 10 mLs (200 mg) by mouth every 4 hours as needed for cough 200 mL 1     hydrochlorothiazide (MICROZIDE) 12.5 MG capsule TAKE 1 CAPSULE (12.5 MG TOTAL) BY MOUTH DAILY FOR BLOOD PRESSURE 90 capsule 3     hydrocortisone (CORTAID) 1 % external cream Apply topically 2 times daily 60 g 0     insulin aspart (NOVOLOG PEN) 100 UNIT/ML pen 10 U BID before meals 15 mL 4     insulin  glargine (LANTUS PEN) 100 UNIT/ML pen Inject 40 Units Subcutaneous every morning 45 mL 3     insulin pen needle (32G X 4 MM) 32G X 4 MM miscellaneous Use one pen needles daily or as directed. 200 each 11     ipratropium - albuterol 0.5 mg/2.5 mg/3 mL (DUONEB) 0.5-2.5 (3) MG/3ML neb solution Take 1 vial (3 mLs) by nebulization every 6 hours as needed for shortness of breath / dyspnea or wheezing 360 mL 11     lidocaine (XYLOCAINE) 5 % external ointment Apply topically 3 times daily as needed Small amount (finger tip amount) to chest tid prn pain 35.44 g 0     meclizine (ANTIVERT) 12.5 MG tablet Take 2 tablets (25 mg) by mouth 3 times daily as needed for dizziness 30 tablet 0     metFORMIN (GLUCOPHAGE) 1000 MG tablet Take 1 tablet (1,000 mg) by mouth 2 times daily (with meals) 180 tablet 1     metoprolol tartrate (LOPRESSOR) 25 MG tablet TAKE 1 TABLET (25 MG TOTAL) BY MOUTH 2 (TWO) TIMES A DAY FOR BLOOD PRESSURE 180 tablet 3     montelukast (SINGULAIR) 10 MG tablet Take 1 tablet (10 mg) by mouth At Bedtime 30 tablet 6     nystatin (MYCOSTATIN) 672988 UNIT/ML suspension Take 5 mLs (500,000 Units) by mouth 4 times daily 473 mL 1     omeprazole (PRILOSEC) 40 MG DR capsule Take 1 capsule (40 mg) by mouth daily 90 capsule 3     Polyethylene Glycol 3350 (PEG 3350) 17 GM/SCOOP POWD MIX 17 GRAMS WITH LIQUID AND DRINK ONCE DAILY FOR CONSTIPATION 510 g 12     polyvinyl alcohol (ARTIFICIAL TEARS) 1.4 % ophthalmic solution Place 1 drop into both eyes as needed for dry eyes 15 mL 3     sucralfate (CARAFATE) 1 GM tablet TAKE 1 TABLET (1 G TOTAL) BY MOUTH 4 (FOUR) TIMES A DAY BEFORE MEALS AND AT BEDTIME. TAKE 1 HOUR PRIOR TO MEALS 120 tablet 11     tiotropium (SPIRIVA RESPIMAT) 2.5 MCG/ACT inhalation aerosol Inhale 2 puffs into the lungs daily 4 g 1     Social history:  Lives in a house built in 1963; no concern for mold in the house.  7 People live in the house including 2 children.  There are no pets in the house.  She is a never  smoker and there is no second hand exposure to smoke.  She does not drink alcoholic beverages and denies indulging in recreational drugs.    Physical Exam   /64 (BP Location: Right arm, Patient Position: Sitting, Cuff Size: Adult Regular)   Pulse 85   Wt 55 kg (121 lb 5 oz)   SpO2 98%   BMI 25.35 kg/m    Physical Exam  Physical Exam  Constitutional:       General: She is not in acute distress.     Appearance: She is not ill-appearing or diaphoretic.   HENT:      Nose: Nose normal.   Cardiovascular:      Rate and Rhythm: Normal rate and regular rhythm.      Pulses: Normal pulses.      Heart sounds: Normal heart sounds.   Pulmonary:      Effort: Tachypnea present. No respiratory distress.      Breath sounds: No stridor. Wheezing (coarse, throughout. inspiratory and expiratory) present.      Comments: Active dry cough during exam. Audible wheezes while breathing.   Skin:     General: Skin is warm and dry.      Findings: No rash.   Neurological:      Mental Status: She is alert.   Psychiatric:         Behavior: Behavior normal.          Latest Reference Range & Units 02/07/22 15:47   Neutrophil Cytoplasmic Antibody <1:10  <1:10   Neutrophil Cytoplasmic Antibody Pattern  The ANCA IFA is <1:10.  No further testing will be performed.      Latest Reference Range & Units 02/07/22 15:47   Schistosoma Ab IgG Negative  Negative [1]   Strongyloides Bere IgG <=0.9 IV 0.4 [2]      Latest Reference Range & Units 02/07/22 15:47   Immunoglobulin E <=214 kU/L 74 [1]   Allergen Fungimold A Fumigatus IgE <=0.34 kU/L <0.10 [2]   Aspergillus Fumagatis 1 Antibody None Detected  None Detected [3]   Aspergillus Fumagatis 6 Antibody None Detected  None Detected [4]   Allergen, Interp, Immunocap Score IgE  See Note [5]       EXAM: CT CHEST W/O CONTRAST  LOCATION: [facility]  DATE/TIME: 7/11/2022 11:28 PM  INDICATION: Chest pain.  COMPARISON: 5/4/2022.  TECHNIQUE: CT chest without IV contrast. Multiplanar  reformats were obtained. Dose reduction techniques were used.  CONTRAST: None.  FINDINGS:   LUNGS AND PLEURA: There is atelectasis and scarring at the lung bases. Scarring at the lung apices. Mild dependent atelectasis bilaterally. Subpleural calcified granuloma or calcified plaque in the right lower lobe laterally. Stable 2 mm nodule in the   right upper lobe posteriorly. Stable 3 mm nodule in the lingula laterally. Tiny nodule along the left major fissure laterally is stable. No pneumothorax or pleural effusion.  MEDIASTINUM/AXILLAE: No lymph node enlargement. Central airways are unremarkable. The heart size is normal.  CORONARY ARTERY CALCIFICATION: Previous intervention (stents or CABG).  UPPER ABDOMEN: No acute upper abdominal abnormality.  MUSCULOSKELETAL: Unremarkable.                                                                  IMPRESSION:   1.  No acute abnormality. No cause of chest pain is evident.  2.  A few small lung nodules without significant change.  Recommendations for one or multiple incidental lung nodules < 6mm :    Low risk patients: No routine follow-up.    High risk patients: Optional follow-up CT at 12 months; if unchanged, no further follow-up.    ECHO 5/4/2022  Interpretation Summary  Left ventricular size, wall motion and function are normal. The ejection  fraction is 55-60%.  There is borderline anterior, septal, and apical wall hypokinesis.  Normal right ventricle size and systolic function.  No hemodynamically significant valvular abnormalities on 2D or color flow  imaging.

## 2022-11-16 ENCOUNTER — OFFICE VISIT (OUTPATIENT)
Dept: PHARMACY | Facility: CLINIC | Age: 52
End: 2022-11-16
Payer: COMMERCIAL

## 2022-11-16 VITALS
BODY MASS INDEX: 25.86 KG/M2 | SYSTOLIC BLOOD PRESSURE: 118 MMHG | WEIGHT: 123.75 LBS | HEART RATE: 99 BPM | OXYGEN SATURATION: 98 % | DIASTOLIC BLOOD PRESSURE: 70 MMHG

## 2022-11-16 DIAGNOSIS — J45.50 SEVERE PERSISTENT ASTHMA, UNSPECIFIED WHETHER COMPLICATED (H): ICD-10-CM

## 2022-11-16 DIAGNOSIS — R52 PAIN: ICD-10-CM

## 2022-11-16 DIAGNOSIS — F41.9 ANXIETY: Chronic | ICD-10-CM

## 2022-11-16 DIAGNOSIS — Z95.5 S/P CORONARY ARTERY STENT PLACEMENT: ICD-10-CM

## 2022-11-16 DIAGNOSIS — E11.9 TYPE 2 DIABETES MELLITUS TREATED WITH INSULIN (H): ICD-10-CM

## 2022-11-16 DIAGNOSIS — Z79.4 TYPE 2 DIABETES MELLITUS TREATED WITH INSULIN (H): ICD-10-CM

## 2022-11-16 DIAGNOSIS — K21.9 GASTROESOPHAGEAL REFLUX DISEASE WITHOUT ESOPHAGITIS: Primary | ICD-10-CM

## 2022-11-16 PROCEDURE — 99606 MTMS BY PHARM EST 15 MIN: CPT | Performed by: PHARMACIST

## 2022-11-16 PROCEDURE — 99607 MTMS BY PHARM ADDL 15 MIN: CPT | Performed by: PHARMACIST

## 2022-11-16 RX ORDER — CALCIUM CARBONATE 500 MG/1
1 TABLET, CHEWABLE ORAL 2 TIMES DAILY PRN
COMMUNITY
End: 2023-03-21

## 2022-11-16 NOTE — PATIENT INSTRUCTIONS
PLAN:                            1.  Patient to take insulin as prescribed: NovoLog 10 units before your first meal of the day and 12 units with the second meal of the day (do not use Novolog if you skip a meal or have blood sugar of 70 or less)  2.   the yellow boxes (Freestyle Gemma 2) and start to use this to monitor blood sugars. Please remember to scan at least 3-4 times per day. If you have any questions please give the clinic a call!

## 2022-11-16 NOTE — Clinical Note
Idalia Cardoza! Jaylon and I saw Kulwant today. Her post prandial sugars are still elevated so we increased her dinner dose of Novolog from 10 units to 12. We are also getting her set up with CGM.  Thanks!  Luna Yoo, Pharm.D., MPH MTM Pharmacist Resident  [facility]

## 2022-11-18 ENCOUNTER — APPOINTMENT (OUTPATIENT)
Dept: RADIOLOGY | Facility: HOSPITAL | Age: 52
End: 2022-11-18
Attending: PHYSICIAN ASSISTANT
Payer: COMMERCIAL

## 2022-11-18 ENCOUNTER — HOSPITAL ENCOUNTER (EMERGENCY)
Facility: HOSPITAL | Age: 52
Discharge: HOME | End: 2022-11-18
Admitting: PHYSICIAN ASSISTANT
Payer: COMMERCIAL

## 2022-11-18 ENCOUNTER — TELEPHONE (OUTPATIENT)
Dept: FAMILY MEDICINE | Facility: CLINIC | Age: 52
End: 2022-11-18

## 2022-11-18 VITALS
OXYGEN SATURATION: 100 % | HEART RATE: 85 BPM | DIASTOLIC BLOOD PRESSURE: 70 MMHG | SYSTOLIC BLOOD PRESSURE: 129 MMHG | TEMPERATURE: 98.2 F | RESPIRATION RATE: 20 BRPM | WEIGHT: 126 LBS | BODY MASS INDEX: 26.33 KG/M2

## 2022-11-18 DIAGNOSIS — R07.9 CHEST PAIN: ICD-10-CM

## 2022-11-18 DIAGNOSIS — N30.00 ACUTE CYSTITIS WITHOUT HEMATURIA: ICD-10-CM

## 2022-11-18 DIAGNOSIS — R30.0 DYSURIA: ICD-10-CM

## 2022-11-18 LAB
ALBUMIN UR-MCNC: 50 MG/DL
ANION GAP SERPL CALCULATED.3IONS-SCNC: 11 MMOL/L (ref 7–15)
APPEARANCE UR: ABNORMAL
BILIRUB UR QL STRIP: NEGATIVE
BUN SERPL-MCNC: 8.9 MG/DL (ref 6–20)
CALCIUM SERPL-MCNC: 9.1 MG/DL (ref 8.6–10)
CHLORIDE SERPL-SCNC: 103 MMOL/L (ref 98–107)
COLOR UR AUTO: ABNORMAL
CREAT SERPL-MCNC: 0.53 MG/DL (ref 0.51–0.95)
DEPRECATED HCO3 PLAS-SCNC: 27 MMOL/L (ref 22–29)
ERYTHROCYTE [DISTWIDTH] IN BLOOD BY AUTOMATED COUNT: 15.5 % (ref 10–15)
GFR SERPL CREATININE-BSD FRML MDRD: >90 ML/MIN/1.73M2
GLUCOSE SERPL-MCNC: 122 MG/DL (ref 70–99)
GLUCOSE UR STRIP-MCNC: NEGATIVE MG/DL
HCT VFR BLD AUTO: 37.8 % (ref 35–47)
HGB BLD-MCNC: 11.7 G/DL (ref 11.7–15.7)
HGB UR QL STRIP: ABNORMAL
KETONES UR STRIP-MCNC: NEGATIVE MG/DL
LEUKOCYTE ESTERASE UR QL STRIP: ABNORMAL
MCH RBC QN AUTO: 25.8 PG (ref 26.5–33)
MCHC RBC AUTO-ENTMCNC: 31 G/DL (ref 31.5–36.5)
MCV RBC AUTO: 83 FL (ref 78–100)
NITRATE UR QL: NEGATIVE
PH UR STRIP: 7 [PH] (ref 5–7)
PLATELET # BLD AUTO: 243 10E3/UL (ref 150–450)
POTASSIUM SERPL-SCNC: 3.5 MMOL/L (ref 3.4–5.3)
RBC # BLD AUTO: 4.54 10E6/UL (ref 3.8–5.2)
RBC URINE: >182 /HPF
SODIUM SERPL-SCNC: 141 MMOL/L (ref 136–145)
SP GR UR STRIP: 1.01 (ref 1–1.03)
SQUAMOUS EPITHELIAL: 2 /HPF
TROPONIN T SERPL HS-MCNC: <6 NG/L
UROBILINOGEN UR STRIP-MCNC: <2 MG/DL
WBC # BLD AUTO: 7.6 10E3/UL (ref 4–11)
WBC CLUMPS #/AREA URNS HPF: PRESENT /HPF
WBC URINE: >182 /HPF

## 2022-11-18 PROCEDURE — 99285 EMERGENCY DEPT VISIT HI MDM: CPT | Mod: 25

## 2022-11-18 PROCEDURE — 71046 X-RAY EXAM CHEST 2 VIEWS: CPT

## 2022-11-18 PROCEDURE — 87086 URINE CULTURE/COLONY COUNT: CPT | Performed by: PHYSICIAN ASSISTANT

## 2022-11-18 PROCEDURE — 250N000013 HC RX MED GY IP 250 OP 250 PS 637: Performed by: PHYSICIAN ASSISTANT

## 2022-11-18 PROCEDURE — 81001 URINALYSIS AUTO W/SCOPE: CPT | Performed by: PHYSICIAN ASSISTANT

## 2022-11-18 PROCEDURE — 85027 COMPLETE CBC AUTOMATED: CPT | Performed by: PHYSICIAN ASSISTANT

## 2022-11-18 PROCEDURE — 36415 COLL VENOUS BLD VENIPUNCTURE: CPT | Performed by: PHYSICIAN ASSISTANT

## 2022-11-18 PROCEDURE — 250N000009 HC RX 250: Performed by: PHYSICIAN ASSISTANT

## 2022-11-18 PROCEDURE — 80048 BASIC METABOLIC PNL TOTAL CA: CPT | Performed by: PHYSICIAN ASSISTANT

## 2022-11-18 PROCEDURE — 84484 ASSAY OF TROPONIN QUANT: CPT | Performed by: PHYSICIAN ASSISTANT

## 2022-11-18 PROCEDURE — 93005 ELECTROCARDIOGRAM TRACING: CPT | Performed by: STUDENT IN AN ORGANIZED HEALTH CARE EDUCATION/TRAINING PROGRAM

## 2022-11-18 PROCEDURE — 93010 ELECTROCARDIOGRAM REPORT: CPT | Performed by: INTERNAL MEDICINE

## 2022-11-18 RX ORDER — SUCRALFATE ORAL 1 G/10ML
1 SUSPENSION ORAL 4 TIMES DAILY
Qty: 560 ML | Refills: 0 | Status: SHIPPED | OUTPATIENT
Start: 2022-11-18 | End: 2022-12-02

## 2022-11-18 RX ORDER — NITROFURANTOIN 25; 75 MG/1; MG/1
100 CAPSULE ORAL 2 TIMES DAILY
Qty: 10 CAPSULE | Refills: 0 | Status: SHIPPED | OUTPATIENT
Start: 2022-11-18 | End: 2022-11-23

## 2022-11-18 RX ADMIN — ALUMINUM HYDROXIDE, MAGNESIUM HYDROXIDE, AND SIMETHICONE 30 ML: 200; 200; 20 SUSPENSION ORAL at 09:57

## 2022-11-18 ASSESSMENT — ENCOUNTER SYMPTOMS
VOMITING: 0
NAUSEA: 0
CHILLS: 0
FEVER: 0
DYSURIA: 1
SHORTNESS OF BREATH: 1
ABDOMINAL PAIN: 1

## 2022-11-18 ASSESSMENT — ACTIVITIES OF DAILY LIVING (ADL): ADLS_ACUITY_SCORE: 35

## 2022-11-18 NOTE — TELEPHONE ENCOUNTER
Per [facility]N there are no openings with PCP until 12/26/22 and other providers would be 1/10/22.  Does PCP wish to do an OV or vitural and DB it?  Thanks

## 2022-11-18 NOTE — TELEPHONE ENCOUNTER
Reason for Call:  Appointment Request    Patient requesting this type of appt:  Hospital/ED Follow-Up     Requested provider: Adrien Cardoza    Reason patient unable to be scheduled: Not within requested timeframe    When does patient want to be seen/preferred time: follow up Monday November 21    Comments: KENN WASHBURN JOHNS-11/18/22- URINARY SYMPTOMS    Could [facility] or would you prefer to receive a phone call?:   Patient would prefer a phone call   Okay to leave a detailed message?: Yes at Cell number on file:    Telephone Information:   Mobile [phone]       Call taken on 11/18/2022 at 2:32 PM by Enedelia Pierson

## 2022-11-18 NOTE — ED PROVIDER NOTES
EMERGENCY DEPARTMENT ENCOUNTER      NAME: Kulwant Amin  AGE: 52 year old female  YOB: 1970  MRN: [id number]  EVALUATION DATE & TIME: No admission date for patient encounter.    PCP: Adrien Cardoza    ED PROVIDER: Pat Jett PA-C      Chief Complaint   Patient presents with     Dysuria         FINAL IMPRESSION:  1. Chest pain    2. Dysuria    3. Acute cystitis without hematuria          ED COURSE & MEDICAL DECISION MAKIN:52 AM I introduced myself to patient, performed initial HPI and examination.   10:02 AM Updated patient of diagnostic findings. Also discussed plan for discharge at this time.    52 year old female with PMH COPD, Anxiety, CAD with stent placement 2018 not currently on anticoagulatin presents to the Emergency Department for evaluation of dysuria x 3 days. Reports associated suprapubic abdominal pain. No back pain. No nausea/vomiting. No fevers or chills. No hematuria. No history of kidney issues.    UA shows evidence of infection (cloudy, large leukocyte esterase, + WBC clumps, + RBC). Nothing to suggest pyelonephritis, presentation most consistent with simple cystitis. Not consistent with ureterolithiasis. No recent positive urine cultures to check sensitivities, will start on Macrobid pending urine culture/current sensitivities.      Additionally patient reports 2 days of chest pain. Intermittent, not associated with exertion or eating. Describes pain as burning. Does have a history of acid reflux, reportedly taking omeprazole for this.    Patient was given GI cocktail, with improvement.    EKG nonischemic. Troponin negative. CBC and BMP WNL. CXR negative.    Presentation most consistent with GERD. No evidence of catastrophic cardiac etiology. Will discharge with prescription for carafate to be used in addition to her at home omeprazole.    Instructed on at home management, close follow up with PCP on Monday and red flags/indications to return to the emergency department. All  questions were answered to the best of my ability and patient is agreeable with plan.       MEDICATIONS GIVEN IN THE EMERGENCY:  Medications   lidocaine (viscous) (XYLOCAINE) 2 % 15 mL, alum & mag hydroxide-simethicone (MAALOX) 15 mL GI Cocktail (30 mLs Oral Given 11/18/22 0957)       NEW PRESCRIPTIONS STARTED AT TODAY'S ER VISIT  Discharge Medication List as of 11/18/2022 10:04 AM      START taking these medications    Details   nitroFURantoin macrocrystal-monohydrate (MACROBID) 100 MG capsule Take 1 capsule (100 mg) by mouth 2 times daily for 5 days, Disp-10 capsule, R-0, Local Print      sucralfate (CARAFATE) 1 GM/10ML suspension Take 10 mLs (1 g) by mouth 4 times daily for 14 days, Disp-560 mL, R-0, Local Print                =================================================================    HPI    Patient information was obtained from: Patient    Use of : Yes (Phone) - Language: Estonian    Kulwant Amin is a 52 year old female with a pertinent history of CAD with stent placement (2018), arterial ischemic stroke, DM II (on insulin), GERD, COPD, hyperthyroidism, FLACO, moderate persistent asthma, HTN, and anxiety who presents to this ED walk-in for evaluation of dysuria.    Patient is here with with daughter who is assisting with interpretation. Patient reports waxning and waning chest pain for the last 2 days. She describes the pain as a burning sensation, but nothing appears to make it worse or better. Patient also complains of dysuria the last 3 days, lower abdominal pain, and chronic shortness of breath. She otherwise denies nausea, vomiting, fever, or chills. No history of kidney or bladder issues. She does take her omeprazole as instructed.     Of note, she is not on any blood thinners.    REVIEW OF SYSTEMS   Review of Systems   Constitutional: Negative for chills and fever.   Respiratory: Positive for shortness of breath (chronic).    Cardiovascular: Positive for chest pain (burning, waxing and  waning, 2 days).   Gastrointestinal: Positive for abdominal pain (lower). Negative for nausea and vomiting.   Genitourinary: Positive for dysuria (3 days).   All other systems reviewed and are negative.      PAST MEDICAL HISTORY:  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     Microcytic anemia 11/17/2019     S/P coronary artery stent placement 02/02/2018     TB lung, latent     9 mos INH       PAST SURGICAL HISTORY:  Past Surgical History:   Procedure Laterality Date     CORONARY STENT PLACEMENT  2018     CV CORONARY ANGIOGRAM N/A 1/25/2018    Procedure: Coronary Angiogram;  Surgeon: Angelo Serrano MD;  Location: [facility] Cath Lab;  Service:      CV CORONARY ANGIOGRAM N/A 5/5/2022    Procedure: Coronary Angiogram;  Surgeon: Irwin Cano MD;  Location: [facility] CATH LAB CV     CV CORONARY ANGIOGRAM  5/5/2022    Procedure: ;  Surgeon: Irwin Cano MD;  Location: [facility] CATH [facility] CV     IA ESOPHAGOGASTRODUODENOSCOPY TRANSORAL DIAGNOSTIC N/A 12/10/2018    Procedure: ESOPHAGOGASTRODUODENOSCOPY (EGD);  Surgeon: Eddie Renteria MD;  Location: [facility];  Service: Gastroenterology     IA ESOPHAGOGASTRODUODENOSCOPY TRANSORAL DIAGNOSTIC N/A 12/3/2020    Procedure: ESOPHAGOGASTRODUODENOSCOPY (EGD) with biospies ;  Surgeon: Avi Crow MD;  Location: [facility];  Service: Gastroenterology     [facility] COLONOSCOPY W/WO BRUSH/WASH N/A 12/10/2018    Procedure: COLONOSCOPY with  polypectomy using biopsy forceps;  Surgeon: Eddie Renteria MD;  Location: [facility];  Service: Gastroenterology       CURRENT MEDICATIONS:    nitroFURantoin macrocrystal-monohydrate (MACROBID) 100 MG capsule  sucralfate (CARAFATE) 1 GM/10ML suspension  acetaminophen (TYLENOL) 500 MG tablet  albuterol (PROAIR HFA/PROVENTIL HFA/VENTOLIN HFA) 108 (90 Base) MCG/ACT inhaler  albuterol (PROVENTIL) (2.5 MG/3ML) 0.083% neb solution  ALLERGY RELIEF 10 MG tablet  amitriptyline (ELAVIL) 10 MG tablet  ASPIRIN LOW DOSE 81 MG EC tablet  atorvastatin (LIPITOR) 80 MG tablet  B-D U/F 31G X 8 MM insulin pen needle  calcium carbonate (TUMS) 500 MG chewable tablet  Continuous Blood Gluc  (FREESTYLE MONICA 14 DAY READER) MICHAEL  Continuous Blood Gluc Sensor (FREESTYLE MONICA 14 DAY SENSOR) MISC  Continuous Blood Gluc Sensor (FREESTYLE MONICA 2 SENSOR) MISC  CONTOUR NEXT TEST test strip  cyclobenzaprine (FLEXERIL) 5 MG tablet  dextromethorphan (DELSYM) 30 MG/5ML liquid  diclofenac (VOLTAREN) 1 % topical gel  dupilumab (DUPIXENT) 300 MG/2ML prefilled pen  famotidine (PEPCID) 20 MG tablet  FLUoxetine (PROZAC) 10 MG capsule  fluticasone-vilanterol (BREO ELLIPTA) 200-25 MCG/INH inhaler  gabapentin (NEURONTIN) 300 MG capsule  Global Inject Ease Lancets 30G MISC  guaiFENesin (ROBITUSSIN) 100 MG/5ML liquid  hydrochlorothiazide (MICROZIDE) 12.5 MG capsule  hydrocortisone (CORTAID) 1 % external cream  insulin aspart (NOVOLOG PEN) 100 UNIT/ML pen  insulin glargine (LANTUS PEN) 100 UNIT/ML pen  insulin pen needle (32G X 4 MM) 32G X 4 MM miscellaneous  ipratropium - albuterol 0.5 mg/2.5 mg/3 mL (DUONEB) 0.5-2.5 (3) MG/3ML neb solution  lidocaine (XYLOCAINE) 5 % external ointment  meclizine (ANTIVERT) 12.5 MG tablet  metFORMIN (GLUCOPHAGE) 1000 MG tablet  metoprolol tartrate (LOPRESSOR) 25 MG tablet  montelukast (SINGULAIR) 10 MG tablet  nystatin (MYCOSTATIN) 144457 UNIT/ML suspension  omeprazole (PRILOSEC) 40 MG DR capsule  Polyethylene  Glycol 3350 (PEG 3350) 17 GM/SCOOP POWD  polyvinyl alcohol (ARTIFICIAL TEARS) 1.4 % ophthalmic solution  sucralfate (CARAFATE) 1 GM tablet  tiotropium (SPIRIVA RESPIMAT) 2.5 MCG/ACT inhalation aerosol      ALLERGIES:  No Known Allergies    FAMILY HISTORY:  Family History   Problem Relation Age of Onset     Other - See Comments Mother          of an intestinal problem     Ulcers Father          of gastritis     Breast Cancer No family hx of      SOCIAL HISTORY:   Social History     Socioeconomic History     Marital status:    Tobacco Use     Smoking status: Never     Smokeless tobacco: Never     Tobacco comments:     No passive exposure   Substance and Sexual Activity     Alcohol use: No     Drug use: No     Sexual activity: Yes     Partners: Male   Social History Narrative    2017 The patient lives with her daughter-in-law (who is present), , son, and 2 grandchildren (total of 6 people). Immigrant.       VITALS:  /70   Pulse 85   Temp 98.2  F (36.8  C)   Resp 20   Wt 57.2 kg (126 lb)   SpO2 100%   BMI 26.33 kg/m      PHYSICAL EXAM    Constitutional: Well developed, Well nourished, NAD, GCS 15   HENT: Normocephalic, Atraumatic.   Neck- Supple, Normal ROM  Eyes: Conjunctiva normal  Respiratory: No respiratory distress, speaking in full sentences. Normal breath sounds, No wheezing  Cardiovascular: Normal heart rate, Regular rhythm, No murmurs.   GI: Soft, nontender. No distention or masses. No CVA tenderness.    Musculoskeletal: No deformities, Moves all extremities equally. No calf tenderness or swelling.  Integument: Warm, Dry, No erythema, ecchymosis, or rash.  Neurologic: Alert & oriented x 3, Normal sensory function. No focal deficits.   Psychiatric: Affect normal, Judgment normal, Mood normal. Cooperative.      LAB:  All pertinent labs reviewed and interpreted.  Results for orders placed or performed during the hospital encounter of 22   Chest XR,  PA & LAT     Impression    IMPRESSION: Negative chest.   UA with Microscopic reflex to Culture    Specimen: Urine, Clean Catch   Result Value Ref Range    Color Urine Light Red (A) Colorless, Straw, Light Yellow, Yellow    Appearance Urine Cloudy (A) Clear    Glucose Urine Negative Negative mg/dL    Bilirubin Urine Negative Negative    Ketones Urine Negative Negative mg/dL    Specific Gravity Urine 1.009 1.001 - 1.030    Blood Urine >1.0 mg/dL (A) Negative    pH Urine 7.0 5.0 - 7.0    Protein Albumin Urine 50 (A) Negative mg/dL    Urobilinogen Urine <2.0 <2.0 mg/dL    Nitrite Urine Negative Negative    Leukocyte Esterase Urine 500 Shannan/uL (A) Negative    WBC Clumps Urine Present (A) None Seen /HPF    RBC Urine >182 (H) <=2 /HPF    WBC Urine >182 (H) <=5 /HPF    Squamous Epithelials Urine 2 (H) <=1 /HPF   CBC (+ platelets, no diff)   Result Value Ref Range    WBC Count 7.6 4.0 - 11.0 10e3/uL    RBC Count 4.54 3.80 - 5.20 10e6/uL    Hemoglobin 11.7 11.7 - 15.7 g/dL    Hematocrit 37.8 35.0 - 47.0 %    MCV 83 78 - 100 fL    MCH 25.8 (L) 26.5 - 33.0 pg    MCHC 31.0 (L) 31.5 - 36.5 g/dL    RDW 15.5 (H) 10.0 - 15.0 %    Platelet Count 243 150 - 450 10e3/uL   Basic metabolic panel   Result Value Ref Range    Sodium 141 136 - 145 mmol/L    Potassium 3.5 3.4 - 5.3 mmol/L    Chloride 103 98 - 107 mmol/L    Carbon Dioxide (CO2) 27 22 - 29 mmol/L    Anion Gap 11 7 - 15 mmol/L    Urea Nitrogen 8.9 6.0 - 20.0 mg/dL    Creatinine 0.53 0.51 - 0.95 mg/dL    Calcium 9.1 8.6 - 10.0 mg/dL    Glucose 122 (H) 70 - 99 mg/dL    GFR Estimate >90 >60 mL/min/1.73m2   Troponin T, High Sensitivity (now)   Result Value Ref Range    Troponin T, High Sensitivity <6 <=14 ng/L       RADIOLOGY:  Reviewed all pertinent imaging. Please see official radiology report.  Chest XR,  PA & LAT   Final Result   IMPRESSION: Negative chest.          EKG:    Performed at: 8:48:53    Impression: Sinus rhythm, Left anterior fascicular block    Rate: 92 BPM  NH Interval: 146  ms  QRS Interval: 88 ms  QTc Interval: 384/474 ms  ST Changes: None  Comparison: When compared with ECG of 13-Oct-2022 08:24, No significant change was found    Dr. Isaac and I have independently reviewed and interpreted the EKG(s) documented above.    PROCEDURES:   None      I, Romain Allen, am serving as a scribe to document services personally performed by Pat Jett PA-C based on my observation and the provider's statements to me. I, Pat Jett PA-C, attest that Romain Allen is acting in a scribe capacity, has observed my performance of the services and has documented them in accordance with my direction.    Pat Jett PA-C  Emergency Medicine  [facility] EMERGENCY DEPARTMENT  [address] [place] [id number]  [phone]           Pat Jett PA-C  11/18/22 1037

## 2022-11-18 NOTE — DISCHARGE INSTRUCTIONS
Your urine does show sign of infection.  Start the antibiotics - Macrobid 2 times daily for 5 days.    Use tylenol as needed for pain.    Your chest pain work up is reassuring. I suspect this is more acid reflux.  Continue with omeprazole. You can also take sucralfate 4 times daily to help with pain/help your stomach heal.    Follow up in clinic on Monday for re-check.  Return to the emergency department if you develop fevers, worsening/changing pain, vomiting/not keeping fluids down, or any other concerning symptoms. We would be happy to see you.

## 2022-11-18 NOTE — ED TRIAGE NOTES
Patient Chinese speaking, daughter in law helping with language. Here for dysuria for last 3 days. Also states heartburn, no hx CAD

## 2022-11-19 LAB — BACTERIA UR CULT: NORMAL

## 2022-11-21 LAB
ATRIAL RATE - MUSE: 92 BPM
DIASTOLIC BLOOD PRESSURE - MUSE: NORMAL MMHG
INTERPRETATION ECG - MUSE: NORMAL
P AXIS - MUSE: 46 DEGREES
PR INTERVAL - MUSE: 146 MS
QRS DURATION - MUSE: 88 MS
QT - MUSE: 384 MS
QTC - MUSE: 474 MS
R AXIS - MUSE: -56 DEGREES
SYSTOLIC BLOOD PRESSURE - MUSE: NORMAL MMHG
T AXIS - MUSE: 59 DEGREES
VENTRICULAR RATE- MUSE: 92 BPM

## 2022-11-22 ENCOUNTER — TELEPHONE (OUTPATIENT)
Dept: FAMILY MEDICINE | Facility: CLINIC | Age: 52
End: 2022-11-22

## 2022-11-22 NOTE — TELEPHONE ENCOUNTER
Reason for Call:  Other appointment    Detailed comments: Patient was seen in [place] on 11/18/22 and needs a hospital follow up.    PCP is booking into January; daughter in law asked to send a message to care team.     Phone Number Patient can be reached at: Home number on file [phone] (home) or Cell number on file:    Telephone Information:   Mobile [phone]       Best Time: n/a    Can we leave a detailed message on this number? YES    Call taken on 11/22/2022 at 8:57 AM by Araceli Wolfe

## 2022-11-22 NOTE — TELEPHONE ENCOUNTER
Dr. Cardoza- please advise. pt was seen in ED for chest pain on 11/18. Pt would like to schedule a follow-up appt. Okay to wait for next avavilble spot or would you like to double book?    Claude Wilson Cem Say, BSN RN  [facility]

## 2022-11-23 NOTE — TELEPHONE ENCOUNTER
Patient's daughter is calling to inform the team that the appointment on 12/01 @ 2pm doesn't work for them. The daughter states they need a morning appointment. Can patient be rescheduled for morning appointment? Please call daughter at [phone].  Marsha Galindo   [facility]  Central Scheduler

## 2022-11-24 ENCOUNTER — OFFICE VISIT (OUTPATIENT)
Dept: FAMILY MEDICINE | Facility: CLINIC | Age: 52
End: 2022-11-24
Payer: COMMERCIAL

## 2022-11-24 ENCOUNTER — HOSPITAL ENCOUNTER (EMERGENCY)
Facility: HOSPITAL | Age: 52
Discharge: STILL ADMITTED OR ADMINISTRATIVE | End: 2022-11-25
Admitting: RADIOLOGY
Payer: COMMERCIAL

## 2022-11-24 ENCOUNTER — HOSPITAL ENCOUNTER (EMERGENCY)
Dept: CT IMAGING | Facility: HOSPITAL | Age: 52
Discharge: HOME | End: 2022-11-24
Attending: FAMILY MEDICINE
Payer: COMMERCIAL

## 2022-11-24 VITALS
SYSTOLIC BLOOD PRESSURE: 109 MMHG | OXYGEN SATURATION: 96 % | HEART RATE: 87 BPM | TEMPERATURE: 98.7 F | RESPIRATION RATE: 16 BRPM | DIASTOLIC BLOOD PRESSURE: 71 MMHG

## 2022-11-24 DIAGNOSIS — R11.0 NAUSEA: ICD-10-CM

## 2022-11-24 DIAGNOSIS — R30.0 DYSURIA: ICD-10-CM

## 2022-11-24 DIAGNOSIS — R10.30 LOWER ABDOMINAL PAIN: ICD-10-CM

## 2022-11-24 DIAGNOSIS — R19.5 LOOSE STOOLS: ICD-10-CM

## 2022-11-24 DIAGNOSIS — R30.0 DYSURIA: Primary | ICD-10-CM

## 2022-11-24 LAB
ALBUMIN UR-MCNC: NEGATIVE MG/DL
APPEARANCE UR: CLEAR
BACTERIA #/AREA URNS HPF: ABNORMAL /HPF
BILIRUB UR QL STRIP: NEGATIVE
COLOR UR AUTO: YELLOW
GLUCOSE UR STRIP-MCNC: NEGATIVE MG/DL
HGB UR QL STRIP: NEGATIVE
KETONES UR STRIP-MCNC: NEGATIVE MG/DL
LEUKOCYTE ESTERASE UR QL STRIP: ABNORMAL
NITRATE UR QL: NEGATIVE
PH UR STRIP: 5.5 [PH] (ref 5–8)
RBC #/AREA URNS AUTO: ABNORMAL /HPF
SP GR UR STRIP: 1.01 (ref 1–1.03)
SQUAMOUS #/AREA URNS AUTO: ABNORMAL /LPF
UROBILINOGEN UR STRIP-ACNC: 0.2 E.U./DL
WBC #/AREA URNS AUTO: ABNORMAL /HPF

## 2022-11-24 PROCEDURE — 87086 URINE CULTURE/COLONY COUNT: CPT | Performed by: FAMILY MEDICINE

## 2022-11-24 PROCEDURE — 999N000104 HC STATISTIC NO CHARGE

## 2022-11-24 PROCEDURE — 81001 URINALYSIS AUTO W/SCOPE: CPT | Performed by: FAMILY MEDICINE

## 2022-11-24 PROCEDURE — 99214 OFFICE O/P EST MOD 30 MIN: CPT | Performed by: FAMILY MEDICINE

## 2022-11-24 PROCEDURE — 74176 CT ABD & PELVIS W/O CONTRAST: CPT

## 2022-11-24 RX ORDER — SULFAMETHOXAZOLE/TRIMETHOPRIM 800-160 MG
1 TABLET ORAL 2 TIMES DAILY
Qty: 14 TABLET | Refills: 0 | Status: SHIPPED | OUTPATIENT
Start: 2022-11-24 | End: 2022-12-01

## 2022-11-24 NOTE — PROGRESS NOTES
Patient presents with:  Dysuria: Almost 1wk, itching and burning      Clinical Decision Making:      ICD-10-CM    1. Dysuria  R30.0 UA macro with reflex to Microscopic and Culture - Clinc Collect     Urine Microscopic     Urine Culture Aerobic Bacterial     CT Abdomen Pelvis w/o Contrast     sulfamethoxazole-trimethoprim (BACTRIM DS) 800-160 MG tablet      2. Lower abdominal pain  R10.30 CT Abdomen Pelvis w/o Contrast     sulfamethoxazole-trimethoprim (BACTRIM DS) 800-160 MG tablet      3. Nausea  R11.0 CT Abdomen Pelvis w/o Contrast      4. Loose stools  R19.5 CT Abdomen Pelvis w/o Contrast        52-year-old with history of diabetes on insulin who is here for at least a week history of lower abdominal pain, dysuria.  She was seen at the ED on 11/18/2022 for similar symptoms in addition to chest pain.  ED has worked up chest pain complaint with negative EKG and chest x-ray.  Today she tells me that chest pain is gone however ongoing lower abdominal pain and pain with urination.  UA in the ED on 11/18/2022 was unremarkable although she was treated with Macrobid and she tells me today that Macrobid never improved her symptoms.  Reports lower abdominal pain 7-8 out of 10 associated with nausea and loose stool.  No records of colonoscopy done before.  She had similar complaint 2 in September 2022 in the ED and CT abdomen pelvis was unremarkable then.  Given patient's discomfort and ongoing symptoms are awake and urine today is unremarkable, elected to go ahead and do further work-up with abdominal pelvic imaging. CT abdomen and pelvis without contrast done that did not show any reason for her abdominal pain.  We will put her on Bactrim for presumed UTI but urine culture is pending at this point in time.  She does have appointment with her primary care in about a week for follow-up so I advised daughter to discuss her ongoing abdominal pain if it does not improve with antibiotics for potentially further evaluation with  her primary care, such as colonoscopy, urine culture, etc. Daughter and patient verbalized understanding.    Patient Instructions   She has appointment on 12/1/22 with her PCP so please follow up about abdominal pain  CT abdomen/pelvis doesn't show any reason for pain  I will put her on stronger UTI antibiotics for presumed UTI.       HPI:  Kulwant Amin is a 52 year old female who presents today complaining of   Chief Complaint   Patient presents with     Dysuria     Almost 1wk, itching and burning     Was seen in the ED 11/18, was given antibiotics macrobid x 5 days. Symptoms never went away. Reports dysuria. Denies blood in urine. No chest pain today (ED evaluated chest pain 11/18/22). I reviewed ED work up on 11/18/22. No fever. Reports nausea. Some back pain as well. Some diarrhea as well - 2 times yesterday. Loose stool.   H/o diabetes on metformin on insulin.  Reports lower abdominal pain 7-8/10.     History obtained from daughter and the patient.    Problem List:  2022-07: Type 2 diabetes mellitus treated with insulin (H)  2022-05: Chest pain, unspecified type  2022-05: Chest pain  2022-04: FLACO (obstructive sleep apnea)- mild (AHI 14)  2022-01: Infection due to 2019 novel coronavirus  2022-01: Insomnia  2021-12: Constipation  2021-12: Dysphagia  2021-12: Epigastric pain  2021-12: Chronic abdominal pain  2021-09: H/O arterial ischemic stroke  2021-02: Anxiety  2021-02: Dizziness  2021-02: Chronic obstructive pulmonary disease, unspecified COPD type   (H)  2020-12: Moderate major depression (H)  2020-08: Pneumonia  2020-06: Chronic nonintractable headache, unspecified headache type  2020-01: Acute asthma exacerbation  2019-11: Microcytic anemia  2019-11: Latent tuberculosis  2018-12: GI bleeding  2018-12: Rectal bleed  2018-12: Rectal bleeding  2018-11: Hyperlipidemia  2018-03: Moderate persistent asthma  2018-02: S/P coronary artery stent placement  2018-01: Coronary artery disease due to lipid rich  plaque  2016-12: GERD (gastroesophageal reflux disease)  2015-11: Acute asthma exacerbation  2015-11: Asthma exacerbation  2015-06: Cataracts, bilateral  2015-06: Corneal opacity  2015-06: Irregular astigmatism  2015-02: Hyperthyroidism  2014-09: Essential hypertension  2013-08: TB lung, latent  2013-08: COPD (chronic obstructive pulmonary disease)/Asthma   2013-06: Pulmonary nodule, right  2013-06: Environmental allergies  2013-04: Dyspnea  2012-04: Nonspecific (abnormal) findings on radiological and other   examination of other intrathoracic organs  2012-04: Unspecified visual loss  2012-04: Dental caries  Other specified hypotension  Lower GI bleeding      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     Microcytic anemia 11/17/2019     S/P coronary artery stent placement 02/02/2018     TB lung, latent     9 mos INH       Social History     Tobacco Use     Smoking status: Never     Smokeless tobacco: Never     Tobacco comments:     No passive exposure   Substance Use Topics     Alcohol use: No       Review of Systems  Constitutional, HEENT, cardiovascular, pulmonary, gi and gu systems are negative, except as otherwise noted.    Vitals:    11/24/22 1114   BP: 109/71   Pulse: 87   Resp: 16   Temp: 98.7  F (37.1  C)   TempSrc: Oral   SpO2: 96%       Physical Exam  GENERAL: healthy, alert and no apparent distress  NECK: no adenopathy, no asymmetry, masses, or scars and thyroid normal to  palpation  RESP: lungs clear to auscultation - no rales, rhonchi or wheezes  CV: regular rate and rhythm, normal S1 S2, no S3 or S4, no murmur, click or rub, no peripheral edema and peripheral pulses strong  ABDOMEN: soft, tender to palpation throughout lower abdomen, no CVA tenderness on percussion, no hepatosplenomegaly, no masses and bowel sounds normal  MS: no gross musculoskeletal defects noted, no edema  NEURO: Normal strength and tone, mentation intact and speech normal  PSYCH: mentation appears normal, affect normal/bright  Results:  Results for orders placed or performed in visit on 11/24/22   UA macro with reflex to Microscopic and Culture - Clinc Collect     Status: Abnormal    Specimen: Urine, Clean Catch   Result Value Ref Range    Color Urine Yellow Colorless, Straw, Light Yellow, Yellow    Appearance Urine Clear Clear    Glucose Urine Negative Negative mg/dL    Bilirubin Urine Negative Negative    Ketones Urine Negative Negative mg/dL    Specific Gravity Urine 1.015 1.005 - 1.030    Blood Urine Negative Negative    pH Urine 5.5 5.0 - 8.0    Protein Albumin Urine Negative Negative mg/dL    Urobilinogen Urine 0.2 0.2, 1.0 E.U./dL    Nitrite Urine Negative Negative    Leukocyte Esterase Urine Trace (A) Negative   Urine Microscopic     Status: Abnormal   Result Value Ref Range    Bacteria Urine Few (A) None Seen /HPF    RBC Urine None Seen 0-2 /HPF /HPF    WBC Urine 0-5 0-5 /HPF /HPF    Squamous Epithelials Urine Few (A) None Seen /LPF    Narrative    Urine Culture not indicated         At the end of the encounter, I discussed results, diagnosis, medications. Discussed red flags for immediate return to clinic/ER, as well as indications for follow up if no improvement. Patient understood and agreed to plan. Patient was stable for discharge.

## 2022-11-24 NOTE — PATIENT INSTRUCTIONS
She has appointment on 12/1/22 with her PCP so please follow up about abdominal pain  CT abdomen/pelvis doesn't show any reason for pain  I will put her on stronger UTI antibiotics for presumed UTI.

## 2022-11-25 LAB — BACTERIA UR CULT: NORMAL

## 2022-11-25 NOTE — TELEPHONE ENCOUNTER
"Called daughter in law. Relayed PCP's message. She verbalized understanding and wondered if she can come in 30 minutes early (1:30). Writer informed her that she can, however, it is not guaranteed that they will be seen early. Daughter in law verbalized understanding.    \"We squeezed her in on 12/1/2022 evening.  Unable to change to AM.     Dr. Adrien Cardoza   11/25/2022 2:32 PM \"      Claude Wilson Cem Say, BSN RN  [facility]    "

## 2022-11-28 NOTE — PROGRESS NOTES
Medication Therapy Management (MTM) Encounter    ASSESSMENT:                            Medication Adherence/Access: Recommend full assessment at next visit given focus of the visit was on diabetes today as patient was unable to stay for full appt due to her daughter-in-law, who is her ride, having a conflict.     1. Type 2 diabetes mellitus treated without insulin (H)  Recent A1c remains elevated, not meeting goal of <7% though much improved from last check.  Patient currently taking max dose metformin, and basal/bolus insulin.  Given frequent lows, recommend patient decrease bolus insulin to 8 units twice daily with meals. Education provided regarding using Novolog before meals and to skip the dose if she is skipping a meal. Patient understands the Lantus dose will remain the same. Patient was able to get CGM and place sensor, however placement of sensor was on the upper side of arm instead of upper back of arm. Education provided on proper placement of CGM sensor. Also requesting refills of pen needles which were sent to the pharmacy today. A1c and urine albumin labs drawn today, will call the patient once urine albumin results.     PLAN:                            1. Novolog 8 units twice daily BEFORE meals  2. Continue Lantus 40 units at bedtime   3. Place sensor on upper back of arm - provided direction to patient today  4. Labs today: A1c and urine albumin    Future:   - ACEi/ARB pending urine albumin; recommend low dose  - Decreasing insulins as able   - Consider SGLT2i    Follow-up: Return in 6 weeks (on 1/13/2023) for with me.   PCP 1/3/22  SUBJECTIVE/OBJECTIVE:                          Kulwant Amin is a 52 year old female coming in for a follow-up visit. Patient seen with Pepito . Today's visit is a follow-up MTM visit from 11/16/22 She is here today with daugher-in-law.  ID#[id number]    Reason for visit: Diabetes follow-up.    Allergies/ADRs: Reviewed in chart  Past Medical History:  Reviewed in chart  Tobacco: She reports that she has never smoked. She has never used smokeless tobacco.    Medication Adherence/Access: Patient uses pill box. Nurse sets up medications in a pillbox. Patient takes medications 3 time(s) per day (although sucralfate is prescribed four times daily).   Per patient, misses medication 0 times per week and does not ever miss her insulin (daughter-in-law administers insulin).     Type 2 Diabetes:  Metformin 1000 mg twice daily, Lantus 40 units once daily,  Novolog 10 units twice daily (prescribed 10 units in the morning and 12 units in the afternoon; uses novolog before meals in the morning and after her afternoon meal)  - Daughter-in-law helps patient with insulin injections and confirms she has been giving 10 units of Novolog before meals in the morning, and 10 units after meals in the afternoon. She was unaware the afternoon dose was supposed to be 12 units of Novolog before meals.    Blood sugar monitoring: CGM        Hypoglycemia? Yes, and she feels dizzy, shaky, sweaty, fatigued. When she is low she will eat apple or grapes.   Hyperglycemia? Dry mouth  Diet/Exercise: Reports limiting portions, eating boiled rice, meats, veggies. Reports eating 2 meals per day and will occasionally snack but this is limited now that the days are shorter with winter.   Aspirin: Taking 81mg daily for secondary prevention, (previously reported bloody stools but none recently).   Statin: Yes: atorvastatin 80   ACEi/ARB: No.  Not indicated but will recheck urine albumin today  Lab Results   Component Value Date    A1C 8.6 11/30/2022    A1C 10.2 08/25/2022    A1C 10.9 03/25/2022     Creatinine   Date Value Ref Range Status   10/13/2022 0.51 0.51 - 0.95 mg/dL Final   06/18/2015 0.7 0.5 - 1.0 mg/dL Final      Latest Reference Range & Units 05/06/21 11:20   Microalbumin Urine mg/dL 0.00 - 1.99 mg/dL <0.50       Today's Vitals: /68   ----------------      I spent 30 minutes with this  patient today. All changes were made via collaborative practice agreement with Adrien Cardoza MD. A copy of the visit note was provided to the patient's provider(s).    A summary of these recommendations was given to the patient.    Luna Yoo, Pharm.D., MPH  MTM Pharmacist Resident   [facility]    Preceptor cosignature: Patient was seen independently by Dr. Yoo. I have reviewed the assessment and plan. Malu Gil, PharmD, BCACP     Medication Therapy Recommendations  Type 2 diabetes mellitus treated with insulin (H)    Current Medication: Continuous Blood Gluc Sensor (FREESTYLE MONICA 2 SENSOR) MISC   Rationale: Does not understand instructions - Adherence - Adherence   Recommendation: Provide Education   Status: Patient Agreed - Adherence/Education          Current Medication: insulin aspart (NOVOLOG PEN) 100 UNIT/ML pen   Rationale: Dose too high - Dosage too high - Safety   Recommendation: Decrease Dose   Status: Accepted per CPA

## 2022-11-29 DIAGNOSIS — R10.30 LOWER ABDOMINAL PAIN: ICD-10-CM

## 2022-11-29 DIAGNOSIS — R30.0 DYSURIA: ICD-10-CM

## 2022-11-30 ENCOUNTER — LAB (OUTPATIENT)
Dept: LAB | Facility: CLINIC | Age: 52
End: 2022-11-30
Payer: COMMERCIAL

## 2022-11-30 ENCOUNTER — OFFICE VISIT (OUTPATIENT)
Dept: PHARMACY | Facility: CLINIC | Age: 52
End: 2022-11-30
Payer: COMMERCIAL

## 2022-11-30 ENCOUNTER — TELEPHONE (OUTPATIENT)
Dept: FAMILY MEDICINE | Facility: CLINIC | Age: 52
End: 2022-11-30

## 2022-11-30 VITALS — SYSTOLIC BLOOD PRESSURE: 108 MMHG | DIASTOLIC BLOOD PRESSURE: 68 MMHG

## 2022-11-30 DIAGNOSIS — Z79.4 TYPE 2 DIABETES MELLITUS TREATED WITH INSULIN (H): Primary | ICD-10-CM

## 2022-11-30 DIAGNOSIS — E11.9 TYPE 2 DIABETES MELLITUS TREATED WITH INSULIN (H): ICD-10-CM

## 2022-11-30 DIAGNOSIS — E11.9 TYPE 2 DIABETES MELLITUS TREATED WITH INSULIN (H): Primary | ICD-10-CM

## 2022-11-30 DIAGNOSIS — Z79.4 TYPE 2 DIABETES MELLITUS TREATED WITH INSULIN (H): ICD-10-CM

## 2022-11-30 LAB
CREAT UR-MCNC: 78.9 MG/DL
HBA1C MFR BLD: 8.6 % (ref 0–5.6)
MICROALBUMIN UR-MCNC: <12 MG/L
MICROALBUMIN/CREAT UR: NORMAL MG/G{CREAT}

## 2022-11-30 PROCEDURE — 99607 MTMS BY PHARM ADDL 15 MIN: CPT | Performed by: PHARMACIST

## 2022-11-30 PROCEDURE — 83036 HEMOGLOBIN GLYCOSYLATED A1C: CPT

## 2022-11-30 PROCEDURE — 36415 COLL VENOUS BLD VENIPUNCTURE: CPT

## 2022-11-30 PROCEDURE — 82043 UR ALBUMIN QUANTITATIVE: CPT

## 2022-11-30 PROCEDURE — 99606 MTMS BY PHARM EST 15 MIN: CPT | Performed by: PHARMACIST

## 2022-11-30 RX ORDER — SULFAMETHOXAZOLE/TRIMETHOPRIM 800-160 MG
TABLET ORAL
Qty: 14 TABLET | Refills: 0 | OUTPATIENT
Start: 2022-11-30

## 2022-11-30 NOTE — Clinical Note
Just letting you know her A1c is much improved! She was having a few lows per CGM so we decreased her Novolog dose. At next visit, could consider decreasing Lantus or further lowering Novolog if she is still having sporadic lows.  Thanks,  Luna Yoo, Pharm.D., MPH MTM Pharmacist Resident  [facility]

## 2022-11-30 NOTE — TELEPHONE ENCOUNTER
"Routing refill request to provider for review/approval because:  Diagnosis warning  Dose limited SIG.  Please clarify for longer term dosing.    Last Written Prescription Date:  11/24/22  Last Fill Quantity: 14,  # refills: 0   Last office visit provider:  11/24/22     Requested Prescriptions   Pending Prescriptions Disp Refills     sulfamethoxazole-trimethoprim (BACTRIM DS) 800-160 MG tablet [Pharmacy Med Name: SULFA-TMP -160 800-160 Tablet] 14 tablet 0     Sig: TAKE 1 PILL 2 TIMES DAILY FOR 7 DAYS       Oral Acne/Rosacea Medications Protocol Failed - 11/30/2022  9:56 AM        Failed - Confirmation of diagnosis is required     Please confirm diagnosis is acne or rosacea.     If NOT acne or rosacea; refer request to provider for further evaluation.    If diagnosis IS acne or rosacea, OK to refill BASED ON PREVIOUS REFILL CLINICAL NOTE RECOMMENDATION.          Passed - Patient is 12 years of age or older        Passed - Recent (12 mo) or future (30 days) visit within the authorizing provider's specialty     Patient has had an office visit with the authorizing provider or a provider within the authorizing providers department within the previous 12 mos or has a future within next 30 days. See \"Patient Info\" tab in inbasket, or \"Choose Columns\" in Meds & Orders section of the refill encounter.              Passed - Medication is active on med list        Passed - No active pregnancy on record        Passed - No positive prenancy test is past 12 months             Eddie Warner RN 11/30/22 9:56 AM  "

## 2022-11-30 NOTE — PATIENT INSTRUCTIONS
PLAN:                            1. Novolog 8 units twice daily BEFORE meals  2. Continue Lantus 40 units at bedtime   3. Place sensor on upper back of arm - provided direction to patient today  4. Labs today: A1c and urine albumin

## 2022-11-30 NOTE — TELEPHONE ENCOUNTER
Medication Question or Refill    Contacts       Type Contact Phone/Fax    11/30/2022 03:01 PM CST Phone (Incoming) [facility] - [place], [place] - [address] (Pharmacy) [phone]          What medication are you calling about (include dose and sig)?: insulin aspart (NOVOLOG PEN) 100 UNIT/ML pen      Controlled Substance Agreement on file:   CSA -- Patient Level:    CSA: None found at the patient level.       Who prescribed the medication?: Dr. BETTENCOURT    Do you need a refill? No    When did you use the medication last?     Patient offered an appointment? No    Do you have any questions or concerns?  Yes: Inject 8 Units Subcutaneous 2 times daily (with meals) 10 units before first meal of day and 12 units before the second meal of the day     Pharmacy need a clarification on units. Please call Pharmacy if units needs to be 8 or 10.    Preferred Pharmacy:   [facility] - [place], [place] - [address]  [address]  [address]  [place] [address]  Phone: [phone] Fax: [phone]    [address] [address]  Phone: [phone] Fax: [phone]      Could we send this information to you in Language Cloud or would you prefer to receive a phone call?:   Please call Pharmacy back at [phone]

## 2022-12-01 ENCOUNTER — TELEPHONE (OUTPATIENT)
Dept: FAMILY MEDICINE | Facility: CLINIC | Age: 52
End: 2022-12-01

## 2022-12-01 NOTE — TELEPHONE ENCOUNTER
Rx sent by MTM pharmacist yesterday.  Sorry about the confusion.  NovoLog prescription has been updated and sent to the pharmacy.    Prescription should read NovoLog 8 units twice daily.    Malu Gil, PharmD, [facility]  Medication Therapy Management Pharmacist

## 2022-12-01 NOTE — TELEPHONE ENCOUNTER
Called patient but not home.  However, relay message with daughter in law, Yue Amin (C2C on file) with the assistance of an  to relay provider message regarding test result.  Family will relay message to patient.    ZACK Prather, RN  [facility]

## 2022-12-07 DIAGNOSIS — Z76.0 ENCOUNTER FOR MEDICATION REFILL: Primary | ICD-10-CM

## 2022-12-07 DIAGNOSIS — M54.41 CHRONIC BILATERAL LOW BACK PAIN WITH BILATERAL SCIATICA: ICD-10-CM

## 2022-12-07 DIAGNOSIS — M54.42 CHRONIC BILATERAL LOW BACK PAIN WITH BILATERAL SCIATICA: ICD-10-CM

## 2022-12-07 DIAGNOSIS — G89.29 CHRONIC BILATERAL LOW BACK PAIN WITH BILATERAL SCIATICA: ICD-10-CM

## 2022-12-08 DIAGNOSIS — M54.41 CHRONIC BILATERAL LOW BACK PAIN WITH BILATERAL SCIATICA: ICD-10-CM

## 2022-12-08 DIAGNOSIS — M79.18 MYOFASCIAL PAIN: ICD-10-CM

## 2022-12-08 DIAGNOSIS — G89.29 CHRONIC BILATERAL LOW BACK PAIN WITH BILATERAL SCIATICA: ICD-10-CM

## 2022-12-08 DIAGNOSIS — M54.42 CHRONIC BILATERAL LOW BACK PAIN WITH BILATERAL SCIATICA: ICD-10-CM

## 2022-12-08 RX ORDER — GABAPENTIN 300 MG/1
CAPSULE ORAL
Qty: 540 CAPSULE | Refills: 3 | Status: SHIPPED | OUTPATIENT
Start: 2022-12-08 | End: 2023-01-02

## 2022-12-08 NOTE — TELEPHONE ENCOUNTER
PSP: Daily Goldberg    Pharmacy sent refill request for flexeril   --Med last Rx 8/22/22 #30, 3 refills   --Last OV 7/29/21   --Future appt: none    Patient will need to be seen prior to refill as it's been over 1 year.

## 2022-12-09 RX ORDER — CYCLOBENZAPRINE HCL 5 MG
5-10 TABLET ORAL 3 TIMES DAILY PRN
Qty: 30 TABLET | Refills: 3 | Status: SHIPPED | OUTPATIENT
Start: 2022-12-09 | End: 2023-01-02

## 2022-12-09 NOTE — TELEPHONE ENCOUNTER
I called and spoke with pt using a Rowan . Informed pt she will need to schedule a F/U appt with Daily for refill on Flexeril. Transferred to  to schedule.

## 2022-12-10 DIAGNOSIS — K21.9 GASTROESOPHAGEAL REFLUX DISEASE WITHOUT ESOPHAGITIS: ICD-10-CM

## 2022-12-11 RX ORDER — OMEPRAZOLE 40 MG/1
40 CAPSULE, DELAYED RELEASE ORAL DAILY
Qty: 90 CAPSULE | Refills: 3 | Status: SHIPPED | OUTPATIENT
Start: 2022-12-11 | End: 2023-03-14

## 2022-12-11 NOTE — TELEPHONE ENCOUNTER
"Last Written Prescription Date:  11/30/21  Last Fill Quantity: 90,  # refills: 3   Last office visit provider:   10/25/22    Requested Prescriptions   Pending Prescriptions Disp Refills     omeprazole (PRILOSEC) 40 MG DR capsule [Pharmacy Med Name: OMEPRAZOLE 40 MG CPDR 40 Capsule] 90 capsule 3     Sig: TAKE 1 CAPSULE (40 MG) BY MOUTH DAILY       PPI Protocol Passed - 12/10/2022 10:35 AM        Passed - Not on Clopidogrel (unless Pantoprazole ordered)        Passed - No diagnosis of osteoporosis on record        Passed - Recent (12 mo) or future (30 days) visit within the authorizing provider's specialty     Patient has had an office visit with the authorizing provider or a provider within the authorizing providers department within the previous 12 mos or has a future within next 30 days. See \"Patient Info\" tab in inbasket, or \"Choose Columns\" in Meds & Orders section of the refill encounter.              Passed - Medication is active on med list        Passed - Patient is age 18 or older        Passed - No active pregnacy on record        Passed - No positive pregnancy test in past 12 months             Yolette Ram RN 12/11/22 5:21 PM  "

## 2022-12-14 PROCEDURE — 999N000104 HC STATISTIC NO CHARGE

## 2022-12-22 ENCOUNTER — VIRTUAL VISIT (OUTPATIENT)
Dept: PULMONOLOGY | Facility: CLINIC | Age: 52
End: 2022-12-22
Payer: COMMERCIAL

## 2022-12-22 DIAGNOSIS — J45.51 SEVERE PERSISTENT ASTHMA WITH EXACERBATION (H): Primary | ICD-10-CM

## 2022-12-22 DIAGNOSIS — J44.9 CHRONIC OBSTRUCTIVE PULMONARY DISEASE, UNSPECIFIED COPD TYPE (H): ICD-10-CM

## 2022-12-22 DIAGNOSIS — R05.1 ACUTE COUGH: ICD-10-CM

## 2022-12-22 PROCEDURE — 99214 OFFICE O/P EST MOD 30 MIN: CPT | Mod: 93 | Performed by: NURSE PRACTITIONER

## 2022-12-22 RX ORDER — DEXTROMETHORPHAN POLISTIREX 30 MG/5ML
60 SUSPENSION ORAL 2 TIMES DAILY PRN
Qty: 178 ML | Refills: 0 | Status: SHIPPED | OUTPATIENT
Start: 2022-12-22 | End: 2023-11-07

## 2022-12-22 RX ORDER — PREDNISONE 20 MG/1
40 TABLET ORAL DAILY
Qty: 14 TABLET | Refills: 0 | Status: SHIPPED | OUTPATIENT
Start: 2022-12-22 | End: 2022-12-29

## 2022-12-22 ASSESSMENT — PATIENT HEALTH QUESTIONNAIRE - PHQ9
10. IF YOU CHECKED OFF ANY PROBLEMS, HOW DIFFICULT HAVE THESE PROBLEMS MADE IT FOR YOU TO DO YOUR WORK, TAKE CARE OF THINGS AT HOME, OR GET ALONG WITH OTHER PEOPLE: NOT DIFFICULT AT ALL
SUM OF ALL RESPONSES TO PHQ QUESTIONS 1-9: 15
SUM OF ALL RESPONSES TO PHQ QUESTIONS 1-9: 15

## 2022-12-22 NOTE — PATIENT INSTRUCTIONS
Here is our plan after the telephone visit today:    Continue Breo Ellipta, rinse mouth after use  Continue Spiriva  Continue albuterol TID and PRN  Continue singulair  Continue Dupixmarkos through Dr. Marie  Start prednisone 40mg x7 days for exacerbation.  Call us if this does not improve your breathing.  Follow-up in person with Dr. Duong in 2 months    Ellen Harris, CNP  Pulmonary Medicine  [facility]  [phone]

## 2022-12-22 NOTE — PROGRESS NOTES
Kulwant is a 52 year old who is being evaluated via a billable telephone visit.      What phone number would you like to be contacted at? [phone]  How would you like to obtain your AVS? MyChart  {PROVIDER LOCATION On-site should be selected for visits conducted from your clinic location or adjoining [facility] hospital, academic office, or other nearby [facility] building. Off-site should be selected for all other provider locations, including home:[id number]}  Distant Location (provider location):  {virtual location provider:[id number]}  Phone call duration: *** minutes

## 2022-12-22 NOTE — PROGRESS NOTES
Kulwant is a 52 year old who is being evaluated via a billable telephone visit.      What phone number would you like to be contacted at? [phone]  How would you like to obtain your AVS? Reta    Distant Location (provider location):  Off-site  Phone call duration: 21 minutes  Start time: 10:21am  End time: 10:42am    Pulmonary Outpatient Clinic Follow Up    Assessment/Plan:    52 y.o.-year-old woman with history of organic fuel exposure in the home was previously had nondiagnostic PFTs.  She was previously evaluated by Dr. Marie and initiated on Dupixent for her hypereosinophilia.  She endorses symptoms of exacerbation today (increased cough and SOB).    Continue Breo Ellipta 1 puff daily.  She knows to gargle after using this.    Continue Spiriva.    Continue to use 3 times daily albuterol nebs.    Continue Singulair daily     Continue Protonix 40 mg daily.    As needed albuterol for rescue.    Has received both doses of her Covid-19 vaccine, UTD on booster    Has had seasonal flu vaccine    Continue Tums as needed for gastric upset.     Continue Dupixent through Dr. Marie    Start prednisone 40mg x7 days for exacerbation.    Follow-up in person in 2 months    Ellen Harris, CNP  Pulmonary Medicine  [facility]  [phone]      Subjective:   Patient ID: Ms. Amin is a 52 y.o.female with a past medical history significant for anxiety, arthritis, questionable asthma versus COPD, diabetes, hypertension and a prior history of SVT presents with a follow-up telephone visit for her pulmonary complaints.  She follows fairly closely for her moderate-persistent, difficult to control asthma symptoms.     Since last visit, she has had an ED visit for UTI, this is resolved.  She has had increase in cough and SOB over the past week, no recent illness exposures that she knows of.  The cough is sometimes dry, sometimes with small amt of sputum production.  Also having a low  appetite.    Continues to use Breo twice daily and rinses mouth, spiriva once daily, and albuterol TID.      ACT Total Scores 8/26/2022 10/12/2022 11/14/2022   ACT TOTAL SCORE - - -   ASTHMA ER VISITS - - -   ASTHMA HOSPITALIZATIONS - - -   ACT TOTAL SCORE (Goal Greater than or Equal to 20) 12 7 10   In the past 12 months, how many times did you visit the emergency room for your asthma without being admitted to the hospital? 1 3 3   In the past 12 months, how many times were you hospitalized overnight because of your asthma? 0 3 3       Current Outpatient Medications   Medication Sig Dispense Refill     acetaminophen (TYLENOL) 500 MG tablet TAKE 1 PILL BY MOUTH EVERY 6 HOURS AS NEEDED FOR PAIN 100 tablet 10     albuterol (PROAIR HFA/PROVENTIL HFA/VENTOLIN HFA) 108 (90 Base) MCG/ACT inhaler Inhale 2 puffs into the lungs every 4 hours as needed for shortness of breath / dyspnea 4 g 11     albuterol (PROVENTIL) (2.5 MG/3ML) 0.083% neb solution Take 1 vial (2.5 mg) by nebulization every 6 hours as needed 90 mL 11     ALLERGY RELIEF 10 MG tablet TAKE 1 TABLET (10 MG TOTAL) BY MOUTH DAILY FOR ALLERGIES 30 tablet 3     amitriptyline (ELAVIL) 10 MG tablet TAKE 2 TABLETS (20 MG TOTAL) BY MOUTH AT BEDTIME. 180 tablet 3     ASPIRIN LOW DOSE 81 MG EC tablet TAKE 1 TABLET (81 MG TOTAL) BY MOUTH DAILY. 90 tablet 3     atorvastatin (LIPITOR) 80 MG tablet TAKE 1 TABLET (80 MG TOTAL) BY MOUTH AT BEDTIME FOR CHOLESTEROL 90 tablet 3     B-D U/F 31G X 8 MM insulin pen needle USE ONE PEN NEEDLE DAILY AS NEEDED FOR  each 1     calcium carbonate (TUMS) 500 MG chewable tablet Take 1 chew tab by mouth 2 times daily as needed       Continuous Blood Gluc Sensor (FREESTYLE MONICA 2 SENSOR) MISC 1 each every 14 days Use 1 sensor every 14 days. Use to read blood sugars per 's instructions. 2 each 11     CONTOUR NEXT TEST test strip USE 1 EACH AS DIRECTED 3 (THREE) TIMES A DAY. 50 strip 11     cyclobenzaprine (FLEXERIL) 5 MG  tablet TAKE 1-2 TABLETS (5-10 MG) BY MOUTH 3 TIMES DAILY AS NEEDED FOR MUSCLE SPASMS 30 tablet 3     dextromethorphan (DELSYM) 30 MG/5ML liquid Take 10 mLs (60 mg) by mouth 2 times daily as needed for cough 178 mL 0     diclofenac (VOLTAREN) 1 % topical gel Apply 4 g topically 4 times daily 350 g 3     dupilumab (DUPIXENT) 300 MG/2ML prefilled pen Inject 2 mLs (300 mg) Subcutaneous every 14 days 4 mL 3     famotidine (PEPCID) 20 MG tablet Take 1 tablet (20 mg) by mouth 2 times daily 90 tablet 3     FLUoxetine (PROZAC) 10 MG capsule Take 1 capsule (10 mg) by mouth daily 90 capsule 1     fluticasone-vilanterol (BREO ELLIPTA) 200-25 MCG/INH inhaler Inhale 1 puff into the lungs daily 60 each 11     gabapentin (NEURONTIN) 300 MG capsule TAKE 2 CAPSULES (600 MG) BY MOUTH 3 TIMES DAILY FOR NERVE PAIN 540 capsule 3     Global Inject Ease Lancets 30G MISC USE 1 EACH AS DIRECTED 3 (THREE) TIMES A DAY. DISPENSE BRAND PER PATIENT'S INSURANCE AT PHARMACY DISCRETION.(E11.9) 100 each 3     guaiFENesin (ROBITUSSIN) 100 MG/5ML liquid Take 10 mLs (200 mg) by mouth every 4 hours as needed for cough 200 mL 1     hydrochlorothiazide (MICROZIDE) 12.5 MG capsule TAKE 1 CAPSULE (12.5 MG TOTAL) BY MOUTH DAILY FOR BLOOD PRESSURE 90 capsule 3     hydrocortisone (CORTAID) 1 % external cream Apply topically 2 times daily 60 g 0     insulin aspart (NOVOLOG PEN) 100 UNIT/ML pen Inject 8 Units Subcutaneous 2 times daily (with meals) 30 mL 4     insulin glargine (LANTUS PEN) 100 UNIT/ML pen Inject 40 Units Subcutaneous every morning 45 mL 3     insulin pen needle (32G X 4 MM) 32G X 4 MM miscellaneous Use one pen needles daily or as directed. 200 each 11     ipratropium - albuterol 0.5 mg/2.5 mg/3 mL (DUONEB) 0.5-2.5 (3) MG/3ML neb solution Take 1 vial (3 mLs) by nebulization every 6 hours as needed for shortness of breath / dyspnea or wheezing 360 mL 11     lidocaine (XYLOCAINE) 5 % external ointment Apply topically 3 times daily as needed Small  amount (finger tip amount) to chest tid prn pain 35.44 g 0     meclizine (ANTIVERT) 12.5 MG tablet Take 2 tablets (25 mg) by mouth 3 times daily as needed for dizziness 30 tablet 0     metFORMIN (GLUCOPHAGE) 1000 MG tablet Take 1 tablet (1,000 mg) by mouth 2 times daily (with meals) 180 tablet 1     metoprolol tartrate (LOPRESSOR) 25 MG tablet TAKE 1 TABLET (25 MG TOTAL) BY MOUTH 2 (TWO) TIMES A DAY FOR BLOOD PRESSURE 180 tablet 3     montelukast (SINGULAIR) 10 MG tablet Take 1 tablet (10 mg) by mouth At Bedtime 30 tablet 6     nystatin (MYCOSTATIN) 899115 UNIT/ML suspension Take 5 mLs (500,000 Units) by mouth 4 times daily 473 mL 1     omeprazole (PRILOSEC) 40 MG DR capsule TAKE 1 CAPSULE (40 MG) BY MOUTH DAILY 90 capsule 3     Polyethylene Glycol 3350 (PEG 3350) 17 GM/SCOOP POWD MIX 17 GRAMS WITH LIQUID AND DRINK ONCE DAILY FOR CONSTIPATION 510 g 12     sucralfate (CARAFATE) 1 GM tablet TAKE 1 TABLET (1 G TOTAL) BY MOUTH 4 (FOUR) TIMES A DAY BEFORE MEALS AND AT BEDTIME. TAKE 1 HOUR PRIOR TO MEALS 120 tablet 11     tiotropium (SPIRIVA RESPIMAT) 2.5 MCG/ACT inhalation aerosol Inhale 2 puffs into the lungs daily 4 g 1     polyvinyl alcohol (ARTIFICIAL TEARS) 1.4 % ophthalmic solution Place 1 drop into both eyes as needed for dry eyes (Patient not taking: Reported on 12/22/2022) 15 mL 3     Social history:  Lives in a house built in [place]; no concern for mold in the house.  7 People live in the house including 2 children.  There are no pets in the house.  She is a never smoker and there is no second hand exposure to smoke.  She does not drink alcoholic beverages and denies indulging in recreational drugs.    Physical Exam   There were no vitals taken for this visit.  No physical exam d/t telephone visit.  Respirations were even and unlabored over the phone.       Latest Reference Range & Units 02/07/22 15:47   Neutrophil Cytoplasmic Antibody <1:10  <1:10   Neutrophil Cytoplasmic Antibody Pattern  The ANCA IFA is <1:10.   No further testing will be performed.      Latest Reference Range & Units 02/07/22 15:47   Schistosoma Ab IgG Negative  Negative [1]   Strongyloides Bere IgG <=0.9 IV 0.4 [2]      Latest Reference Range & Units 02/07/22 15:47   Immunoglobulin E <=214 kU/L 74 [1]   Allergen Fungimold A Fumigatus IgE <=0.34 kU/L <0.10 [2]   Aspergillus Fumagatis 1 Antibody None Detected  None Detected [3]   Aspergillus Fumagatis 6 Antibody None Detected  None Detected [4]   Allergen, Interp, Immunocap Score IgE  See Note [5]       EXAM: CT CHEST W/O CONTRAST  LOCATION: [facility]  DATE/TIME: 7/11/2022 11:28 PM  INDICATION: Chest pain.  COMPARISON: 5/4/2022.  TECHNIQUE: CT chest without IV contrast. Multiplanar reformats were obtained. Dose reduction techniques were used.  CONTRAST: None.  FINDINGS:   LUNGS AND PLEURA: There is atelectasis and scarring at the lung bases. Scarring at the lung apices. Mild dependent atelectasis bilaterally. Subpleural calcified granuloma or calcified plaque in the right lower lobe laterally. Stable 2 mm nodule in the   right upper lobe posteriorly. Stable 3 mm nodule in the lingula laterally. Tiny nodule along the left major fissure laterally is stable. No pneumothorax or pleural effusion.  MEDIASTINUM/AXILLAE: No lymph node enlargement. Central airways are unremarkable. The heart size is normal.  CORONARY ARTERY CALCIFICATION: Previous intervention (stents or CABG).  UPPER ABDOMEN: No acute upper abdominal abnormality.  MUSCULOSKELETAL: Unremarkable.                                                                  IMPRESSION:   1.  No acute abnormality. No cause of chest pain is evident.  2.  A few small lung nodules without significant change.  Recommendations for one or multiple incidental lung nodules < 6mm :    Low risk patients: No routine follow-up.    High risk patients: Optional follow-up CT at 12 months; if unchanged, no further follow-up.    ECHO  5/4/2022  Interpretation Summary  Left ventricular size, wall motion and function are normal. The ejection  fraction is 55-60%.  There is borderline anterior, septal, and apical wall hypokinesis.  Normal right ventricle size and systolic function.  No hemodynamically significant valvular abnormalities on 2D or color flow  imaging.

## 2023-01-02 ENCOUNTER — OFFICE VISIT (OUTPATIENT)
Dept: PHYSICAL MEDICINE AND REHAB | Facility: CLINIC | Age: 53
End: 2023-01-02
Payer: COMMERCIAL

## 2023-01-02 VITALS
HEART RATE: 69 BPM | WEIGHT: 122 LBS | OXYGEN SATURATION: 99 % | BODY MASS INDEX: 25.5 KG/M2 | SYSTOLIC BLOOD PRESSURE: 117 MMHG | DIASTOLIC BLOOD PRESSURE: 70 MMHG

## 2023-01-02 DIAGNOSIS — M25.562 PAIN IN BOTH KNEES, UNSPECIFIED CHRONICITY: ICD-10-CM

## 2023-01-02 DIAGNOSIS — M25.561 PAIN IN BOTH KNEES, UNSPECIFIED CHRONICITY: ICD-10-CM

## 2023-01-02 DIAGNOSIS — M54.41 CHRONIC BILATERAL LOW BACK PAIN WITH BILATERAL SCIATICA: Primary | ICD-10-CM

## 2023-01-02 DIAGNOSIS — Z76.0 ENCOUNTER FOR MEDICATION REFILL: ICD-10-CM

## 2023-01-02 DIAGNOSIS — M48.061 LUMBAR FORAMINAL STENOSIS: ICD-10-CM

## 2023-01-02 DIAGNOSIS — M51.369 BULGING LUMBAR DISC: ICD-10-CM

## 2023-01-02 DIAGNOSIS — M79.18 MYOFASCIAL PAIN: ICD-10-CM

## 2023-01-02 DIAGNOSIS — G89.29 CHRONIC BILATERAL LOW BACK PAIN WITH BILATERAL SCIATICA: Primary | ICD-10-CM

## 2023-01-02 DIAGNOSIS — M54.42 CHRONIC BILATERAL LOW BACK PAIN WITH BILATERAL SCIATICA: Primary | ICD-10-CM

## 2023-01-02 PROCEDURE — 99214 OFFICE O/P EST MOD 30 MIN: CPT | Performed by: NURSE PRACTITIONER

## 2023-01-02 RX ORDER — GABAPENTIN 300 MG/1
600 CAPSULE ORAL 3 TIMES DAILY
Qty: 540 CAPSULE | Refills: 3 | Status: SHIPPED | OUTPATIENT
Start: 2023-01-02 | End: 2023-01-03

## 2023-01-02 RX ORDER — CYCLOBENZAPRINE HCL 5 MG
5-10 TABLET ORAL 3 TIMES DAILY PRN
Qty: 30 TABLET | Refills: 3 | Status: SHIPPED | OUTPATIENT
Start: 2023-01-02 | End: 2023-03-14

## 2023-01-02 ASSESSMENT — PAIN SCALES - GENERAL: PAINLEVEL: MODERATE PAIN (5)

## 2023-01-02 NOTE — LETTER
1/2/2023         RE: Kulwant Amin  [address] [facility] [address]        Dear Colleague,    Thank you for referring your patient, Kulwant Amin, to the [facility]. Please see a copy of my visit note below.      Assessment:     Diagnoses and all orders for this visit:  Chronic bilateral low back pain with bilateral sciatica  -     gabapentin (NEURONTIN) 300 MG capsule; Take 2 capsules (600 mg) by mouth 3 times daily  -     cyclobenzaprine (FLEXERIL) 5 MG tablet; Take 1-2 tablets (5-10 mg) by mouth 3 times daily as needed for muscle spasms  Bulging lumbar disc  Lumbar foraminal stenosis  Myofascial pain  -     cyclobenzaprine (FLEXERIL) 5 MG tablet; Take 1-2 tablets (5-10 mg) by mouth 3 times daily as needed for muscle spasms  Encounter for medication refill  -     gabapentin (NEURONTIN) 300 MG capsule; Take 2 capsules (600 mg) by mouth 3 times daily  Pain in both knees, unspecified chronicity  -     diclofenac (VOLTAREN) 1 % topical gel; Apply 4 g topically 4 times daily     Kulwant Amin is a 53 year old y.o. female with past medical history significant for hypertension, asthma exacerbation, depression, type 2 diabetes mellitus, GERD, headache, COPD, nonspecific chest pain, CAD, latent TB, lower GI bleed, generalized headaches  who presents today for follow-up regarding:    --Bilateral low back pain with lumbar radiculitis left greater than right for the last 2 months with no known injury.  MRI with disc bulging at L5-S1 with facet arthropathy with left S1 and slight L5 compression.  L4-5 with annular fissure.     Plan:     A shared decision making plan was used. The patient's values and choices were respected. Prior medical records were reviewed today. The following represents what was discussed and decided upon by the provider and the patient.        -DIAGNOSTIC TESTS: Images were personally reviewed and interpreted.   --Lumbar spine MRI 6/18/2021 with left disc  protrusion with left L5 and S1 compression, moderate facet arthropathy.  L4-5 annular tear, no nerve compression.    -INTERVENTIONS: Trial either bilateral L5-S1 TFESI at any time, currently patient defers this option.    -MEDICATIONS: Refilled cyclobenzaprine and diclofenac to use as needed, gabapentin to take continuously which she does find helpful.  Discussed side effects of medications and proper use. Patient verbalized understanding.    -PHYSICAL THERAPY: Encourage patient continue with physical therapy home exercises from prior physical therapy sessions.  Did discuss that if she wanted more in session physical therapy I would be happy to place a referral for this.  If she would like home physical therapy she should talk to her primary care provider about this.  Discussed the importance of core strengthening, ROM, stretching exercises with the patient and how each of these entities is important in decreasing pain.  Explained to the patient that the purpose of physical therapy is to teach the patient a home exercise program.  These exercises need to be performed every day in order to decrease pain and prevent future occurrences of pain.        -PATIENT EDUCATION:  Total time of 32 minutes, on the day of service, spent with the patient, reviewing the chart, placing orders, and documenting.   -Today we also discussed the issues related to the current COVID-19 pandemic, the pros and cons of the current treatment plan, the CDC guidelines such as social distancing, washing the hands, and covering the cough.    -FOLLOW UP: Follow-up as needed  Advised to contact clinic if symptoms worsen or change.    Subjective:     Kulwant Amin is a 53 year old female who presents today for follow-up regarding chronic bilateral low back pain with bilateral lower extremity pain currently greater on the right however last year was greater on the left.  She does report that her pain is overall tolerable at a 5/10 but does get up to a  10/10 at its worst.  Patient denies any recent trips or falls but she does feel some weakness in bilateral legs and walks with a cane.  She does report intermittent numbness and tingling in her legs as well.     was present during entire visit today.     -Treatment to Date: No prior spinal surgery or spinal injections  PT home therapy recently for multiple medical concerns.  Physical therapy 2021 [facility] x2 sessions.     -Medications:  Flexeril with benefit  Gabapentin 300 mg, 2 tablets 3 times daily    Patient Active Problem List   Diagnosis     Pulmonary nodule, right     Environmental allergies     TB lung, latent     COPD (chronic obstructive pulmonary disease)/Asthma      Essential hypertension     Hyperthyroidism     Cataracts, bilateral     Corneal opacity     Irregular astigmatism     Anxiety     Chronic nonintractable headache, unspecified headache type     Coronary artery disease due to lipid rich plaque     Dizziness     Hyperlipidemia     Moderate major depression (H)     Moderate persistent asthma     Unspecified visual loss     GERD (gastroesophageal reflux disease)     Dental caries     H/O arterial ischemic stroke     Constipation     Dysphagia     Chronic abdominal pain     Insomnia     FLACO (obstructive sleep apnea)- mild (AHI 14)     Chest pain     Chest pain, unspecified type     Type 2 diabetes mellitus treated with insulin (H)       Current Outpatient Medications   Medication     acetaminophen (TYLENOL) 500 MG tablet     cyclobenzaprine (FLEXERIL) 5 MG tablet     diclofenac (VOLTAREN) 1 % topical gel     gabapentin (NEURONTIN) 300 MG capsule     albuterol (PROAIR HFA/PROVENTIL HFA/VENTOLIN HFA) 108 (90 Base) MCG/ACT inhaler     albuterol (PROVENTIL) (2.5 MG/3ML) 0.083% neb solution     ALLERGY RELIEF 10 MG tablet     amitriptyline (ELAVIL) 10 MG tablet     ASPIRIN LOW DOSE 81 MG EC tablet     atorvastatin (LIPITOR) 80 MG tablet     B-D U/F 31G X 8 MM insulin pen needle     calcium  carbonate (TUMS) 500 MG chewable tablet     Continuous Blood Gluc Sensor (FREESTYLE MONICA 2 SENSOR) [facility] [place], [place]     CONTOUR NEXT TEST test strip     dextromethorphan (DELSYM) 30 MG/5ML liquid     dupilumab (DUPIXENT) 300 MG/2ML prefilled pen     famotidine (PEPCID) 20 MG tablet     FLUoxetine (PROZAC) 10 MG capsule     fluticasone-vilanterol (BREO ELLIPTA) 200-25 MCG/INH inhaler     gabapentin (NEURONTIN) 300 MG capsule     Global Inject Ease Lancets 30G MISC     guaiFENesin (ROBITUSSIN) 100 MG/5ML liquid     hydrochlorothiazide (MICROZIDE) 12.5 MG capsule     hydrocortisone (CORTAID) 1 % external cream     insulin aspart (NOVOLOG PEN) 100 UNIT/ML pen     insulin glargine (LANTUS PEN) 100 UNIT/ML pen     insulin pen needle (32G X 4 MM) 32G X 4 MM miscellaneous     ipratropium - albuterol 0.5 mg/2.5 mg/3 mL (DUONEB) 0.5-2.5 (3) MG/3ML neb solution     lidocaine (XYLOCAINE) 5 % external ointment     meclizine (ANTIVERT) 12.5 MG tablet     metFORMIN (GLUCOPHAGE) 1000 MG tablet     metoprolol tartrate (LOPRESSOR) 25 MG tablet     montelukast (SINGULAIR) 10 MG tablet     nystatin (MYCOSTATIN) 011350 UNIT/ML suspension     omeprazole (PRILOSEC) 40 MG DR capsule     Polyethylene Glycol 3350 (PEG 3350) 17 GM/SCOOP POWD     polyvinyl alcohol (ARTIFICIAL TEARS) 1.4 % ophthalmic solution     sucralfate (CARAFATE) 1 GM tablet     tiotropium (SPIRIVA RESPIMAT) 2.5 MCG/ACT inhaler     No current facility-administered medications for this visit.       No Known Allergies    Past Medical History:   Diagnosis Date     Acute asthma exacerbation 01/06/2020     Anxiety      Arthritis      Asthma exacerbation 11/19/2015     Chronic obstructive pulmonary disease, unspecified COPD type (H)      COPD (chronic obstructive pulmonary disease) (H)      Coronary artery disease due to lipid rich plaque      Depression      Epigastric pain 12/15/2021     Essential hypertension      GERD (gastroesophageal reflux disease)      Infection due to 2019 novel  coronavirus 01/03/2022    positive with COVID-19 on January 3, 2022     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     Microcytic anemia 11/17/2019     S/P coronary artery stent placement 02/02/2018     TB lung, latent     9 mos INH        Review of Systems  ROS:  Specifically negative for bowel/bladder dysfunction, balance changes, headache, dizziness, foot drop, fevers, chills, appetite changes, nausea/vomiting, unexplained weight loss. Otherwise 13 systems reviewed are negative. Please see the patient's intake questionnaire from today for details.    Reviewed Social, Family, Past Medical and Past Surgical history with patient, no significant changes noted since prior visit.     Objective:     /70 (BP Location: Right arm, Patient Position: Sitting)   Pulse 69   Wt 122 lb (55.3 kg)   SpO2 99%   BMI 25.50 kg/m      PHYSICAL EXAMINATION:    --CONSTITUTIONAL: Well developed, well nourished, healthy appearing individual.  --PSYCHIATRIC: Appropriate mood and affect. No difficulty interacting due to temper, social withdrawal, or memory issues.  --SKIN: Lumbar region is dry and intact.   --RESPIRATORY: Normal rhythm and effort. No abnormal accessory muscle breathing patterns noted.   --MUSCULOSKELETAL:  Normal lumbar lordosis noted, no lateral shift.  --GROSS MOTOR: Easily arises from a seated position. Gait is non-antalgic  --LUMBAR SPINE:  Inspection reveals no evidence of deformity.   --LOWER EXTREMITY MOTOR TESTING:  Plantar flexion left 5/5, right 5/5   Dorsiflexion left 5/5, right 5/5   Great toe MTP extension left 5/5, right 5/5  Knee flexion left 5/5, right 5/5  Knee extension left 5/5, right 5/5   Hip flexion left 5/5, right 5/5  Hip abduction left 5/5, right 5/5  Hip adduction left 5/5, right 5/5   --HIPS: Full range of motion bilaterally.   --NEUROLOGIC: Bilateral patellar and achilles reflexes are physiologic and symmetric. Sensation to light touch is intact in the bilateral L4, L5, and S1  dermatomes.    RESULTS:   Imaging: Spine imaging was reviewed today. The images were shown to the patient and the findings were explained using a spine model.                              Again, thank you for allowing me to participate in the care of your patient.        Sincerely,        Daily Goldberg, CNP

## 2023-01-02 NOTE — PROGRESS NOTES
Assessment:     Diagnoses and all orders for this visit:  Chronic bilateral low back pain with bilateral sciatica  -     gabapentin (NEURONTIN) 300 MG capsule; Take 2 capsules (600 mg) by mouth 3 times daily  -     cyclobenzaprine (FLEXERIL) 5 MG tablet; Take 1-2 tablets (5-10 mg) by mouth 3 times daily as needed for muscle spasms  Bulging lumbar disc  Lumbar foraminal stenosis  Myofascial pain  -     cyclobenzaprine (FLEXERIL) 5 MG tablet; Take 1-2 tablets (5-10 mg) by mouth 3 times daily as needed for muscle spasms  Encounter for medication refill  -     gabapentin (NEURONTIN) 300 MG capsule; Take 2 capsules (600 mg) by mouth 3 times daily  Pain in both knees, unspecified chronicity  -     diclofenac (VOLTAREN) 1 % topical gel; Apply 4 g topically 4 times daily     Kulwant Amin is a 53 year old y.o. female with past medical history significant for hypertension, asthma exacerbation, depression, type 2 diabetes mellitus, GERD, headache, COPD, nonspecific chest pain, CAD, latent TB, lower GI bleed, generalized headaches  who presents today for follow-up regarding:    --Bilateral low back pain with lumbar radiculitis left greater than right for the last 2 months with no known injury.  MRI with disc bulging at L5-S1 with facet arthropathy with left S1 and slight L5 compression.  L4-5 with annular fissure.     Plan:     A shared decision making plan was used. The patient's values and choices were respected. Prior medical records were reviewed today. The following represents what was discussed and decided upon by the provider and the patient.        -DIAGNOSTIC TESTS: Images were personally reviewed and interpreted.   --Lumbar spine MRI 6/18/2021 with left disc protrusion with left L5 and S1 compression, moderate facet arthropathy.  L4-5 annular tear, no nerve compression.    -INTERVENTIONS: Trial either bilateral L5-S1 TFESI at any time, currently patient defers this option.    -MEDICATIONS: Refilled cyclobenzaprine  and diclofenac to use as needed, gabapentin to take continuously which she does find helpful.  Discussed side effects of medications and proper use. Patient verbalized understanding.    -PHYSICAL THERAPY: Encourage patient continue with physical therapy home exercises from prior physical therapy sessions.  Did discuss that if she wanted more in session physical therapy I would be happy to place a referral for this.  If she would like home physical therapy she should talk to her primary care provider about this.  Discussed the importance of core strengthening, ROM, stretching exercises with the patient and how each of these entities is important in decreasing pain.  Explained to the patient that the purpose of physical therapy is to teach the patient a home exercise program.  These exercises need to be performed every day in order to decrease pain and prevent future occurrences of pain.        -PATIENT EDUCATION:  Total time of 32 minutes, on the day of service, spent with the patient, reviewing the chart, placing orders, and documenting.   -Today we also discussed the issues related to the current COVID-19 pandemic, the pros and cons of the current treatment plan, the CDC guidelines such as social distancing, washing the hands, and covering the cough.    -FOLLOW UP: Follow-up as needed  Advised to contact clinic if symptoms worsen or change.    Subjective:     Kulwant Amin is a 53 year old female who presents today for follow-up regarding chronic bilateral low back pain with bilateral lower extremity pain currently greater on the right however last year was greater on the left.  She does report that her pain is overall tolerable at a 5/10 but does get up to a 10/10 at its worst.  Patient denies any recent trips or falls but she does feel some weakness in bilateral legs and walks with a cane.  She does report intermittent numbness and tingling in her legs as well.     was present during entire visit  today.     -Treatment to Date: No prior spinal surgery or spinal injections  PT home therapy recently for multiple medical concerns.  Physical therapy 2021 [facility] x2 sessions.     -Medications:  Flexeril with benefit  Gabapentin 300 mg, 2 tablets 3 times daily    Patient Active Problem List   Diagnosis     Pulmonary nodule, right     Environmental allergies     TB lung, latent     COPD (chronic obstructive pulmonary disease)/Asthma      Essential hypertension     Hyperthyroidism     Cataracts, bilateral     Corneal opacity     Irregular astigmatism     Anxiety     Chronic nonintractable headache, unspecified headache type     Coronary artery disease due to lipid rich plaque     Dizziness     Hyperlipidemia     Moderate major depression (H)     Moderate persistent asthma     Unspecified visual loss     GERD (gastroesophageal reflux disease)     Dental caries     H/O arterial ischemic stroke     Constipation     Dysphagia     Chronic abdominal pain     Insomnia     FLACO (obstructive sleep apnea)- mild (AHI 14)     Chest pain     Chest pain, unspecified type     Type 2 diabetes mellitus treated with insulin (H)       Current Outpatient Medications   Medication     acetaminophen (TYLENOL) 500 MG tablet     cyclobenzaprine (FLEXERIL) 5 MG tablet     diclofenac (VOLTAREN) 1 % topical gel     gabapentin (NEURONTIN) 300 MG capsule     albuterol (PROAIR HFA/PROVENTIL HFA/VENTOLIN HFA) 108 (90 Base) MCG/ACT inhaler     albuterol (PROVENTIL) (2.5 MG/3ML) 0.083% neb solution     ALLERGY RELIEF 10 MG tablet     amitriptyline (ELAVIL) 10 MG tablet     ASPIRIN LOW DOSE 81 MG EC tablet     atorvastatin (LIPITOR) 80 MG tablet     B-D U/F 31G X 8 MM insulin pen needle     calcium carbonate (TUMS) 500 MG chewable tablet     Continuous Blood Gluc Sensor (FREESTYLE MONICA 2 SENSOR) MISC     CONTOUR NEXT TEST test strip     dextromethorphan (DELSYM) 30 MG/5ML liquid     dupilumab (DUPIXENT) 300 MG/2ML prefilled pen     famotidine (PEPCID) 20  MG tablet     FLUoxetine (PROZAC) 10 MG capsule     fluticasone-vilanterol (BREO ELLIPTA) 200-25 MCG/INH inhaler     gabapentin (NEURONTIN) 300 MG capsule     Global Inject Ease Lancets 30G MISC     guaiFENesin (ROBITUSSIN) 100 MG/5ML liquid     hydrochlorothiazide (MICROZIDE) 12.5 MG capsule     hydrocortisone (CORTAID) 1 % external cream     insulin aspart (NOVOLOG PEN) 100 UNIT/ML pen     insulin glargine (LANTUS PEN) 100 UNIT/ML pen     insulin pen needle (32G X 4 MM) 32G X 4 MM miscellaneous     ipratropium - albuterol 0.5 mg/2.5 mg/3 mL (DUONEB) 0.5-2.5 (3) MG/3ML neb solution     lidocaine (XYLOCAINE) 5 % external ointment     meclizine (ANTIVERT) 12.5 MG tablet     metFORMIN (GLUCOPHAGE) 1000 MG tablet     metoprolol tartrate (LOPRESSOR) 25 MG tablet     montelukast (SINGULAIR) 10 MG tablet     nystatin (MYCOSTATIN) 840849 UNIT/ML suspension     omeprazole (PRILOSEC) 40 MG DR capsule     Polyethylene Glycol 3350 (PEG 3350) 17 GM/SCOOP POWD     polyvinyl alcohol (ARTIFICIAL TEARS) 1.4 % ophthalmic solution     sucralfate (CARAFATE) 1 GM tablet     tiotropium (SPIRIVA RESPIMAT) 2.5 MCG/ACT inhaler     No current facility-administered medications for this visit.       No Known Allergies    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     Microcytic anemia 11/17/2019     S/P coronary artery stent placement 02/02/2018     TB lung, latent     9 mos INH        Review of Systems  ROS:   Specifically negative for bowel/bladder dysfunction, balance changes, headache, dizziness, foot drop, fevers, chills, appetite changes, nausea/vomiting, unexplained weight loss. Otherwise 13 systems reviewed are negative. Please see the patient's intake questionnaire from today for details.    Reviewed Social, Family, Past Medical and Past Surgical history with patient, no significant changes noted since prior visit.     Objective:     /70 (BP Location: Right arm, Patient Position: Sitting)   Pulse 69   Wt 122 lb (55.3 kg)   SpO2 99%   BMI 25.50 kg/m      PHYSICAL EXAMINATION:    --CONSTITUTIONAL: Well developed, well nourished, healthy appearing individual.  --PSYCHIATRIC: Appropriate mood and affect. No difficulty interacting due to temper, social withdrawal, or memory issues.  --SKIN: Lumbar region is dry and intact.   --RESPIRATORY: Normal rhythm and effort. No abnormal accessory muscle breathing patterns noted.   --MUSCULOSKELETAL:  Normal lumbar lordosis noted, no lateral shift.  --GROSS MOTOR: Easily arises from a seated position. Gait is non-antalgic  --LUMBAR SPINE:  Inspection reveals no evidence of deformity.   --LOWER EXTREMITY MOTOR TESTING:  Plantar flexion left 5/5, right 5/5   Dorsiflexion left 5/5, right 5/5   Great toe MTP extension left 5/5, right 5/5  Knee flexion left 5/5, right 5/5  Knee extension left 5/5, right 5/5   Hip flexion left 5/5, right 5/5  Hip abduction left 5/5, right 5/5  Hip adduction left 5/5, right 5/5   --HIPS: Full range of motion bilaterally.   --NEUROLOGIC: Bilateral patellar and achilles reflexes are physiologic and symmetric. Sensation to light touch is intact in the bilateral L4, L5, and S1 dermatomes.    RESULTS:   Imaging: Spine imaging was reviewed today. The images were shown to the patient and the findings were explained using a spine model.

## 2023-01-02 NOTE — PATIENT INSTRUCTIONS
~Please call our [facility] Nurse Navigation line [phone] with any questions or concerns about your treatment plan, if symptoms worsen and you would like to be seen urgently, or if you have problems controlling bladder and bowel function.       Importance of specialized Physical Therapy: Discussed the importance of core strengthening, ROM, stretching exercises with the patient and how each of these entities is important in decreasing pain.  Explained to the patient that the purpose of physical therapy is to teach the patient a home exercise program individualized to them and their specific health concerns.  These exercises need to be performed every day in order to decrease pain and prevent future occurrences of pain.

## 2023-01-03 ENCOUNTER — OFFICE VISIT (OUTPATIENT)
Dept: FAMILY MEDICINE | Facility: CLINIC | Age: 53
End: 2023-01-03
Payer: COMMERCIAL

## 2023-01-03 VITALS
RESPIRATION RATE: 16 BRPM | DIASTOLIC BLOOD PRESSURE: 68 MMHG | TEMPERATURE: 98.3 F | OXYGEN SATURATION: 97 % | HEART RATE: 80 BPM | WEIGHT: 122.44 LBS | HEIGHT: 58 IN | SYSTOLIC BLOOD PRESSURE: 120 MMHG | BODY MASS INDEX: 25.7 KG/M2

## 2023-01-03 DIAGNOSIS — R10.9 ABDOMINAL PAIN, UNSPECIFIED ABDOMINAL LOCATION: ICD-10-CM

## 2023-01-03 DIAGNOSIS — Z79.4 TYPE 2 DIABETES MELLITUS TREATED WITH INSULIN (H): Primary | ICD-10-CM

## 2023-01-03 DIAGNOSIS — I25.83 CORONARY ARTERY DISEASE DUE TO LIPID RICH PLAQUE: Chronic | ICD-10-CM

## 2023-01-03 DIAGNOSIS — E11.9 TYPE 2 DIABETES MELLITUS TREATED WITH INSULIN (H): Primary | ICD-10-CM

## 2023-01-03 DIAGNOSIS — G89.29 CHRONIC BILATERAL LOW BACK PAIN WITH BILATERAL SCIATICA: ICD-10-CM

## 2023-01-03 DIAGNOSIS — R51.9 CHRONIC NONINTRACTABLE HEADACHE, UNSPECIFIED HEADACHE TYPE: Chronic | ICD-10-CM

## 2023-01-03 DIAGNOSIS — I10 ESSENTIAL HYPERTENSION: ICD-10-CM

## 2023-01-03 DIAGNOSIS — M54.42 CHRONIC BILATERAL LOW BACK PAIN WITH BILATERAL SCIATICA: ICD-10-CM

## 2023-01-03 DIAGNOSIS — G89.29 CHRONIC NONINTRACTABLE HEADACHE, UNSPECIFIED HEADACHE TYPE: Chronic | ICD-10-CM

## 2023-01-03 DIAGNOSIS — J44.9 CHRONIC OBSTRUCTIVE PULMONARY DISEASE, UNSPECIFIED COPD TYPE (H): Chronic | ICD-10-CM

## 2023-01-03 DIAGNOSIS — M54.41 CHRONIC BILATERAL LOW BACK PAIN WITH BILATERAL SCIATICA: ICD-10-CM

## 2023-01-03 DIAGNOSIS — Z91.09 ENVIRONMENTAL ALLERGIES: ICD-10-CM

## 2023-01-03 DIAGNOSIS — I25.10 CORONARY ARTERY DISEASE DUE TO LIPID RICH PLAQUE: Chronic | ICD-10-CM

## 2023-01-03 LAB
ALBUMIN UR-MCNC: NEGATIVE MG/DL
APPEARANCE UR: CLEAR
BACTERIA #/AREA URNS HPF: ABNORMAL /HPF
BILIRUB UR QL STRIP: NEGATIVE
COLOR UR AUTO: YELLOW
GLUCOSE UR STRIP-MCNC: NEGATIVE MG/DL
HGB UR QL STRIP: NEGATIVE
KETONES UR STRIP-MCNC: NEGATIVE MG/DL
LEUKOCYTE ESTERASE UR QL STRIP: ABNORMAL
MUCOUS THREADS #/AREA URNS LPF: PRESENT /LPF
NITRATE UR QL: NEGATIVE
PH UR STRIP: 6.5 [PH] (ref 5–7)
RBC #/AREA URNS AUTO: ABNORMAL /HPF
SP GR UR STRIP: 1.02 (ref 1–1.03)
SQUAMOUS #/AREA URNS AUTO: ABNORMAL /LPF
UROBILINOGEN UR STRIP-ACNC: 1 E.U./DL
WBC #/AREA URNS AUTO: ABNORMAL /HPF

## 2023-01-03 PROCEDURE — 99214 OFFICE O/P EST MOD 30 MIN: CPT | Performed by: FAMILY MEDICINE

## 2023-01-03 PROCEDURE — 87086 URINE CULTURE/COLONY COUNT: CPT | Performed by: FAMILY MEDICINE

## 2023-01-03 PROCEDURE — 81001 URINALYSIS AUTO W/SCOPE: CPT | Performed by: FAMILY MEDICINE

## 2023-01-03 RX ORDER — METOPROLOL TARTRATE 25 MG/1
TABLET, FILM COATED ORAL
Qty: 180 TABLET | Refills: 3 | Status: SHIPPED | OUTPATIENT
Start: 2023-01-03 | End: 2023-12-14

## 2023-01-03 RX ORDER — MONTELUKAST SODIUM 10 MG/1
10 TABLET ORAL AT BEDTIME
Qty: 30 TABLET | Refills: 6 | Status: CANCELLED | OUTPATIENT
Start: 2023-01-03

## 2023-01-03 RX ORDER — LORATADINE 10 MG/1
TABLET ORAL
Qty: 30 TABLET | Refills: 2 | Status: SHIPPED | OUTPATIENT
Start: 2023-01-03 | End: 2023-03-31

## 2023-01-03 RX ORDER — GABAPENTIN 300 MG/1
600 CAPSULE ORAL 3 TIMES DAILY
Qty: 540 CAPSULE | Refills: 3 | Status: SHIPPED | OUTPATIENT
Start: 2023-01-03 | End: 2024-01-22

## 2023-01-03 RX ORDER — FAMOTIDINE 20 MG/1
20 TABLET, FILM COATED ORAL 2 TIMES DAILY
Qty: 90 TABLET | Refills: 3 | Status: SHIPPED | OUTPATIENT
Start: 2023-01-03 | End: 2023-01-18

## 2023-01-03 RX ORDER — LORATADINE 10 MG/1
TABLET ORAL
Qty: 30 TABLET | Refills: 2 | Status: CANCELLED | OUTPATIENT
Start: 2023-01-03

## 2023-01-03 NOTE — PROGRESS NOTES
Type 2 diabetes mellitus treated with insulin (H)  No changes today.  Advised to keep all follow-up appointments.    Essential hypertension  Controlled.    Chronic bilateral low back pain with bilateral sciatica  Follow-up with spine center as scheduled.  - gabapentin (NEURONTIN) 300 MG capsule; Take 2 capsules (600 mg) by mouth 3 times daily    Coronary artery disease due to lipid rich plaque  Sees cardiology regularly.    Chronic nonintractable headache, unspecified headache type  No acute headache today.  Follow-up with neurology as scheduled.    Abdominal pain, unspecified abdominal location  .  UA and culture and treat if indicated.  Recently treated for UTI per patient.  - UA macro with reflex to Microscopic and Culture - Clinc Collect  - Urine Microscopic  - Urine Culture Aerobic Bacterial    Chronic obstructive pulmonary disease, unspecified COPD type (H)  Managed by pulmonology.    Doug Blum is a 53 year old accompanied by her Daughter-in-law, presenting for the following health issues:  Medication Check  DM: Currently using Lantus 40 units at night and mealtime insulin 8 units before meals.  Blood sugar still fluctuates, daughter-in-law states that he has to do with her diet.  Last A1c was 8.6 in November 2022, was 10.2 in August 2022.  No low blood sugar with hypoglycemic symptoms reported since last visit.  She was recently seen in the hospital due to lower abdominal pain by daughter-in-law (no complete record to review in chart) was diagnosed with UTI and was given antibiotic.  Still complaining of mild lower abdominal pain but no dysuria, frequency or blood in the urine.  No fever or chills.  She is seeing multiple specialists for other chronic conditions including cardiology, pulmonology, allergy and recently at spine clinic.      Review of Systems   CONSTITUTIONAL: NEGATIVE for fever, chills, change in weight  ENT/MOUTH: NEGATIVE for ear, mouth and throat problems  CV: NEGATIVE for chest  "pain, palpitations or peripheral edema      Objective    /68 (BP Location: Left arm, Patient Position: Sitting, Cuff Size: Adult Regular)   Pulse 80   Temp 98.3  F (36.8  C) (Oral)   Resp 16   Ht 1.473 m (4' 10\")   Wt 55.5 kg (122 lb 7 oz)   SpO2 97%   BMI 25.59 kg/m    Body mass index is 25.59 kg/m .  Physical Exam   GENERAL: healthy, alert and no distress  NECK: no adenopathy, no asymmetry, masses, or scars and thyroid normal to palpation  RESP: lungs clear to auscultation - no rales, rhonchi or wheezes  CV: regular rate and rhythm, normal S1 S2, no S3 or S4, no murmur, click or rub, no peripheral edema and peripheral pulses strong  ABDOMEN: soft, nontender, no hepatosplenomegaly, no masses and bowel sounds normal  Diabetic foot exam: normal DP and PT pulses, no trophic changes or ulcerative lesions and normal sensory exam              Answers for HPI/ROS submitted by the patient on 1/3/2023  What is the reason for your visit today? : Med Check  How many days per week do you miss taking your medication?: 0      "

## 2023-01-04 ENCOUNTER — TELEPHONE (OUTPATIENT)
Dept: FAMILY MEDICINE | Facility: CLINIC | Age: 53
End: 2023-01-04

## 2023-01-04 NOTE — TELEPHONE ENCOUNTER
Daughter, Yvette called with patient on the phone.  An  was used for assistance.  Patient verbalized clinic staff to speak on her behalf.  Family would like urine test result.  Per chart review, urine culture is in process.  Once result is back, will call patient with recommendation.    PAULA PratherN, RN  [facility]

## 2023-01-05 ENCOUNTER — TELEPHONE (OUTPATIENT)
Dept: FAMILY MEDICINE | Facility: CLINIC | Age: 53
End: 2023-01-05

## 2023-01-05 LAB — BACTERIA UR CULT: NORMAL

## 2023-01-05 NOTE — TELEPHONE ENCOUNTER
Urine culture showed normal keyonna. No UTI. No antibiotic needed. Please let the patient know.     Dr. Adrien Cardoza  1/5/2023 7:40 AM    Call placed to Billy Howe, daughter in law (C2C on file) to relay provider message above. Family understood test result without further questions.    Eric Contreras, PAULAN, RN  [facility]

## 2023-01-13 RX ORDER — SULFAMETHOXAZOLE/TRIMETHOPRIM 800-160 MG
TABLET ORAL
Qty: 14 TABLET | Refills: 0 | OUTPATIENT
Start: 2023-01-13

## 2023-01-13 NOTE — TELEPHONE ENCOUNTER
"Routing refill request to provider for review/approval because:  Drug not active on patient's medication list  Diagnosis of oral acne/rosacea    Last Written Prescription Date:  NA  Last Fill Quantity: NA,  # refills: NA   Last office visit provider:  1/3/2023     Requested Prescriptions   Pending Prescriptions Disp Refills     sulfamethoxazole-trimethoprim (BACTRIM DS) 800-160 MG tablet [Pharmacy Med Name: SULFA-TMP -160 800-160 Tablet] 14 tablet 0     Sig: TAKE 1 PILL 2 TIMES DAILY FOR 7 DAYS       Oral Acne/Rosacea Medications Protocol Failed - 1/11/2023  1:22 PM        Failed - Confirmation of diagnosis is required     Please confirm diagnosis is acne or rosacea.     If NOT acne or rosacea; refer request to provider for further evaluation.    If diagnosis IS acne or rosacea, OK to refill BASED ON PREVIOUS REFILL CLINICAL NOTE RECOMMENDATION.          Failed - Medication is active on med list        Passed - Patient is 12 years of age or older        Passed - Recent (12 mo) or future (30 days) visit within the authorizing provider's specialty     Patient has had an office visit with the authorizing provider or a provider within the authorizing providers department within the previous 12 mos or has a future within next 30 days. See \"Patient Info\" tab in inbasket, or \"Choose Columns\" in Meds & Orders section of the refill encounter.              Passed - No active pregnancy on record        Passed - No positive prenancy test is past 12 months             Christine Burns RN 01/12/23 8:38 PM  "

## 2023-01-17 NOTE — PROGRESS NOTES
Medication Therapy Management (MTM) Encounter    ASSESSMENT:                            Medication Adherence/Access: Recommend patient bring in all medications, injections and pillbox to next visit to fully assess adherence.      1. Type 2 diabetes mellitus treated without insulin (H)  Most recent A1c not meeting goal of <7%, although CGM is estimating a more recent A1c of 6.6%.  Patient is due for A1c recheck the end of Feb. Patient currently taking max dose metformin and basal/bolus insulin.  Patient did not make insulin dose adjustments as discussed at last MTM visit, but given most recent CGM results, okay for patient to continue basal/bolus insulin dosing using Novolog 10 units twice daily and Lantus 40 units once daily as she is currently doing, prescription updated accordingly. Education provided regarding using Novolog before meals and to skip the dose if she is skipping a meal.      2. Gastroesophageal reflux disease without esophagitis  Patient notes that her symptoms are chronic but improve with current medication including as needed TUMS.  Patient currently prescribed PPI, H2 blocker, and sucralfate. Patient requesting refill of sucralfate today, but will defer to PCP for refills as patient states her symptoms are not fully resolved with current medications (including sucralfate) and her symptoms are only marginally worse since running out of sucralfate.      3. Severe persistent asthma, unspecified whether complicated/Hypereosinophilic syndrome/Allergies  Managed by pulmonologist/allergist. Recommend patient continue to follow closely with pulmonologist.      4. S/P coronary artery stent placement/CAD/HX Ischemic Stroke  Managed by cardiologist, has upcoming appointment.  No changes recommended to medications today.     5. Pain  Managed on current medications, no changes recommended today.     6. Depression/Anxiety   Recommend patient take amitriptyline 10mg 2 tablets at bedtime as prescribed and ensure  she is taking fluoxetine 10mg once daily in the morning as prescribed. Will reassess at next visit to ensure patient is taking both mediations as prescribed. If warranted, fluoxetine may be increased.     PLAN:                            1. Continue to use Novolog 10 units twice daily with meals. Only use Novolog 15 mins before a meal, do not use Novolog if not eating a meal  2. Take amitriptyline 10mg 2 tablets at bedtime as prescribed   3. Check at home to see if you are taking Fluoxetine 10mg once daily in the morning  4. Please bring in all medications and pillboxes to our next visit   5. Pharm D will reach out to Dr. Cardoza regarding refill of sucralfate     Medications/Disease States to be addressed at future visit:   - Pillbox set up     Follow-up: Return in about 2 months (around 3/21/2023).   Neuro 2/15 Card 2/16 Allergy 2/23 Pulm 3/1 PCP 3/14    SUBJECTIVE/OBJECTIVE:                          Kulwant Amin is a 53 year old female coming in for an initial visit of the year. She was referred to me from Adrien Cardoza MD. Patient was accompanied by her daughter. Patient seen with [facility] .ID #[id number]   Reason for visit: Diabetes follow up and Medication Reconciliation     Allergies/ADRs: Reviewed in chart  Past Medical History: Reviewed in chart  Tobacco: She reports that she has never smoked. She has never used smokeless tobacco.    Medication Adherence/Access: A nurse comes to the house once weekly to set up a pillbox (she takes her medications three times daily). Patient has not missed any doses in the past month. Patient presents with medication vials today (except insulin pens, sucralfate, loratadine and fluoxetine) and doesn't bring the pillbox today.  Patient takes medications 3 time(s) per day (although sucralfate is prescribed four times daily).       Type 2 Diabetes:  Metformin 1000 mg twice daily, Lantus 40 units once daily,  Novolog 10 units twice daily (prescribed 10 units before first meal and 12  units before evening meal)   - Denies medication side effects    Blood sugar monitoring: CGM    Hypoglycemia? None per CGM. Aware of how to correct for lows with sugar water.   Hyperglycemia? Dry mouth  Diet/Exercise: Reports eating 2 meals per day, usually eats brown rice, beans, lentils, veggies.   Aspirin: Taking 81mg daily for secondary prevention.   Statin: Yes: atorvastatin 80   ACEi/ARB: No.  Not indicated    Lab Results   Component Value Date    A1C 8.6 11/30/2022    A1C 10.2 08/25/2022    A1C 10.9 03/25/2022     Creatinine   Date Value Ref Range Status   11/18/2022 0.53 0.51 - 0.95 mg/dL Final   06/18/2015 0.7 0.5 - 1.0 mg/dL Final      Latest Reference Range & Units 05/06/21 11:20   Microalbumin Urine mg/dL 0.00 - 1.99 mg/dL <0.50     GERD: omeprazole 40 mg daily, famotidine 20 mg twice daily, sucralfate 1 GM four times daily (has been taking about 2 times daily but has been out for about a week), and Tums as needed   - Does not have sucralfate with her today and is requesting a refill; States her symptoms have been a little bit better overall but slightly worse this past week since running out of sucralfate; does not find her symptoms to be associated with certain types of foods  - Has been using Tums 1-2 times per week  Creatinine   Date Value Ref Range Status   11/18/2022 0.53 0.51 - 0.95 mg/dL Final   06/18/2015 0.7 0.5 - 1.0 mg/dL Final     Allergies/asthma/hypereosinophilia: loratadine 10mg daily, montelukast 10 mg daily, Breo Ellipta 200/25mcg 1 puff daily, Spiriva 2.5 mcg 2 puffs daily, albuterol neb three times daily every day and albuterol HFA daily as needed, Dupixent 300 mg every 2 weeks injections  - Taking all inhalers as prescribed. Has needed to use Albuterol once daily every day   - Pulmonologist: [facility], Tamra Mohr.   Last visit 12/22/22 and has upcoming appt in March.   - Allergy & immunology: [facility], Dr. Elizabeth Marie.      SVT/CAD: Aspirin 81 mg daily,  atorvastatin 80 mg daily, metoprolol tartrate 25 mg twice daily (two bottles with her today from Sept and Dec both for 90 days), hydrochlorothiazide 12.5mg every day (two bottles with her today from Oct and Dec both for 90 days)  - Denies any symptoms of chest pain or dizziness. History of stroke and since then always has a headache. Is following with neurology (has appt 2/15)   - Does report occasionally having bright red blood in her stools about 2-3 times per month; not currently experiencing this but has bleeding hemorrhoids usually 2-3 times per month. PCP is aware of patients hemorrhoids.   - Has meclizine 12.5 mg to take as needed for dizziness but this is not with her today. States she is always dizzy and will take the meclizine about 2 times per day. Does check BP at home and it usually runs in 140s/50s.   - Cardiologist: Del Hirsch MD. last visit was 9/19/2022     Pain: Cyclobenzaprine 1-2 tablets three times daily as needed, amitrityline 10 mg twice daily (prescribed 2 tablets at bedtime), Voltaren 1% gel, gabapentin 300mg 2 capsules three times daily  - Has noticed a little improvement since taking gabapentin three times daily as prescribed instead of only at bedtime   - Used cyclobenzaprine about 2 times this past week  - Finds the topical gel to be helpful with pain      Anxiety/Depression: [id number] once daily( not with patient but they believe this is at home) amitriptyline 10mg 1 tablet twice daily (prescribed 2 tablets at bedtime LF 10/27/22 #180/90)  -  Was taking alprazolam as prescribed in ED, patient found this helpful, PCP preferred to start her on maintenance meds for mood rather than as needed benzo  - States her mood fluctuates up and down   - She reports her sleep is variable     PHQ 10/3/2022 10/25/2022 12/22/2022   PHQ-9 Total Score 13 14 15   Q9: Thoughts of better off dead/self-harm past 2 weeks Not at all Not at all Not at all   F/U: Thoughts of suicide or self-harm  - - -   F/U: Safety concerns - - -       Today's Vitals: BP (P) 116/62   Wt 126 lb (57.2 kg)   BMI 26.33 kg/m    ----------------    I spent 60 minutes with this patient today. Dr. Cardoza was provided the recommendations above  via routed note. A copy of the visit note was provided to the patient's provider(s).    A summary of these recommendations was given to the patient.    Luna Yoo, Pharm.D., MPH  MTM Pharmacist Resident   [facility]  Telemedicine Visit Details    Preceptor cosignature: Patient was seen independently by Dr. Yoo. I have reviewed the assessment and plan. Malu Gil, PharmD, [facility]     Medication Therapy Recommendations  Major depression    Current Medication: amitriptyline (ELAVIL) 10 MG tablet   Rationale: Does not understand instructions - Adherence - Adherence   Recommendation: Provide Education   Status: Patient Agreed - Adherence/Education

## 2023-01-18 ENCOUNTER — OFFICE VISIT (OUTPATIENT)
Dept: PHARMACY | Facility: CLINIC | Age: 53
End: 2023-01-18
Payer: COMMERCIAL

## 2023-01-18 VITALS — BODY MASS INDEX: 26.33 KG/M2 | WEIGHT: 126 LBS

## 2023-01-18 DIAGNOSIS — E11.9 TYPE 2 DIABETES MELLITUS TREATED WITH INSULIN (H): Primary | ICD-10-CM

## 2023-01-18 DIAGNOSIS — F41.9 ANXIETY: ICD-10-CM

## 2023-01-18 DIAGNOSIS — F33.9 RECURRENT MAJOR DEPRESSIVE DISORDER, REMISSION STATUS UNSPECIFIED (H): Chronic | ICD-10-CM

## 2023-01-18 DIAGNOSIS — R52 PAIN: ICD-10-CM

## 2023-01-18 DIAGNOSIS — I25.83 CORONARY ARTERY DISEASE DUE TO LIPID RICH PLAQUE: Chronic | ICD-10-CM

## 2023-01-18 DIAGNOSIS — Z76.0 ENCOUNTER FOR MEDICATION REFILL: Primary | ICD-10-CM

## 2023-01-18 DIAGNOSIS — Z79.4 TYPE 2 DIABETES MELLITUS TREATED WITH INSULIN (H): Primary | ICD-10-CM

## 2023-01-18 DIAGNOSIS — J45.50 SEVERE PERSISTENT ASTHMA, UNSPECIFIED WHETHER COMPLICATED (H): ICD-10-CM

## 2023-01-18 DIAGNOSIS — I25.10 CORONARY ARTERY DISEASE DUE TO LIPID RICH PLAQUE: Chronic | ICD-10-CM

## 2023-01-18 DIAGNOSIS — K21.9 GASTROESOPHAGEAL REFLUX DISEASE WITHOUT ESOPHAGITIS: ICD-10-CM

## 2023-01-18 DIAGNOSIS — Z86.73 H/O ARTERIAL ISCHEMIC STROKE: Chronic | ICD-10-CM

## 2023-01-18 DIAGNOSIS — D72.119 HYPEREOSINOPHILIC SYNDROME, UNSPECIFIED TYPE: ICD-10-CM

## 2023-01-18 PROCEDURE — 99605 MTMS BY PHARM NP 15 MIN: CPT

## 2023-01-18 PROCEDURE — 99607 MTMS BY PHARM ADDL 15 MIN: CPT

## 2023-01-18 NOTE — PATIENT INSTRUCTIONS
PLAN:                            1. Only use Novolog 15 mins before a meal, so if you skip a meal, skip the dose of Novolog   2. Take amitriptyline 10mg 2 tablets at bedtime as prescribed   3. Check at home to see if you are taking Fluoxetine 10mg once daily in the morning  4. Please bring in all medications and pillboxes to our next visit

## 2023-01-18 NOTE — Clinical Note
Jose Cardoza, I was able to see Kulwant today. Overall she is doing pretty well!  Sugars are looking much better. I wanted to reach out to you as she is currently out of Sucralfate (for the past week) and has noticed that her abd pain is marginally worse since stopping (but never fully resolved even when taking the sucralfate). She is wondering if she can get a refill. I told her I would reach out to you for approval first. She is also on PPI and H2Ra and prn Tums. Please let me know if you are okay with this.  -Luna & Jaylon

## 2023-01-19 DIAGNOSIS — Z76.0 ENCOUNTER FOR MEDICATION REFILL: ICD-10-CM

## 2023-01-19 RX ORDER — SUCRALFATE 1 G/1
TABLET ORAL
Qty: 120 TABLET | Refills: 11 | Status: SHIPPED | OUTPATIENT
Start: 2023-01-19 | End: 2023-03-14

## 2023-01-19 RX ORDER — FAMOTIDINE 20 MG/1
TABLET, FILM COATED ORAL
Qty: 180 TABLET | Refills: 3 | Status: SHIPPED | OUTPATIENT
Start: 2023-01-19 | End: 2023-03-14

## 2023-01-23 ENCOUNTER — TELEPHONE (OUTPATIENT)
Dept: PULMONOLOGY | Facility: CLINIC | Age: 53
End: 2023-01-23
Payer: COMMERCIAL

## 2023-01-23 DIAGNOSIS — J45.50 SEVERE PERSISTENT ASTHMA WITHOUT COMPLICATION (H): Primary | ICD-10-CM

## 2023-01-23 RX ORDER — PREDNISONE 20 MG/1
TABLET ORAL
Qty: 14 TABLET | Refills: 0 | Status: SHIPPED | OUTPATIENT
Start: 2023-01-23 | End: 2023-02-28

## 2023-01-23 NOTE — TELEPHONE ENCOUNTER
Phone call from patient's daughter-in-law, Billy.  States that Kulwant has a return of a cough.  NO fever.  Cough is dry and is wheezing.      Will send prescription for her last action plan:  Prednisone 40mg daily x 7 days

## 2023-02-01 ENCOUNTER — TELEPHONE (OUTPATIENT)
Dept: FAMILY MEDICINE | Facility: CLINIC | Age: 53
End: 2023-02-01
Payer: COMMERCIAL

## 2023-02-01 NOTE — TELEPHONE ENCOUNTER
New Medication Request    Contacts       Type Contact Phone/Fax    02/01/2023 09:49 AM CST Phone (Incoming) [facility] - [place], [place] - [address] (Pharmacy) [phone]          What medication are you requesting?: sucralfate (CARAFATE) liquid    Reason for medication request: tablet is on back order, would provider be ok with liquid? Please advise    Have you taken this medication before?: Yes:     Controlled Substance Agreement on file:   CSA -- Patient Level:    CSA: None found at the patient level.         Patient offered an appointment? No    Preferred Pharmacy:   [facility] - [place], [place] - [address]  [address]  [facility] [address]  [place] [address]  Phone: [phone] Fax: [phone]    Could we send this information to you in [facility] or would you prefer to receive a phone call?:   Patient would prefer a phone call   Okay to leave a detailed message?: No at Other phone number:  [phone]

## 2023-02-02 DIAGNOSIS — K21.9 GASTROESOPHAGEAL REFLUX DISEASE WITHOUT ESOPHAGITIS: Primary | ICD-10-CM

## 2023-02-02 RX ORDER — SUCRALFATE ORAL 1 G/10ML
1 SUSPENSION ORAL 4 TIMES DAILY
Qty: 414 ML | Refills: 0 | Status: SHIPPED | OUTPATIENT
Start: 2023-02-02 | End: 2023-03-14

## 2023-02-02 NOTE — PROGRESS NOTES
ASSESMENT AND PLAN:  Diagnoses and all orders for this visit:    Persistent asthma  -     albuterol (PROAIR HFA;PROVENTIL HFA;VENTOLIN HFA) 90 mcg/actuation inhaler; Inhale 2 puffs every 6 (six) hours as needed for wheezing.  Dispense: 1 Inhaler; Refill: 2  -     tiotropium bromide (SPIRIVA RESPIMAT) 2.5 mcg/actuation Mist; Inhale 2 puffs daily.  Dispense: 4 g; Refill: 2  -     Ambulatory referral to Pulmonology  Patient has been on  Dulera, Spiriva, Singulair, DuoNeb, Symbicort and albuterol .She has been on these medications for quite some time before I started seeing her.  She is very reluctant to change her medications in the past visits.    She has been to the ER at least 3 times within the past 4 months.  She was never been admitted as inpatient.     Muscle cramps  -     acetaminophen (PAIN RELIEF EXTRA STRENGTH) 500 MG tablet; TAKE 1 TABLET (500 MG TOTAL) BY MOUTH EVERY 6 (SIX) HOURS AS NEEDED FOR PAIN.  Dispense: 90 tablet; Refill: 1    Essential hypertension  -     hydroCHLOROthiazide (MICROZIDE) 12.5 mg capsule; Take 1 capsule (12.5 mg total) by mouth daily.  Dispense: 90 capsule; Refill: 1  Normal BMP last week.    Pain  -     amitriptyline (ELAVIL) 10 MG tablet; Take 1 tablet (10 mg total) by mouth at bedtime.  Dispense: 30 tablet; Refill: 1  Will try amitriptyline.        SUBJECTIVE: Kulwant JEANNINE Eloisa 47-year-old female with history of hypertension, asthma and anxiety here for ER follow-up.  She has been to the ER multiple times within the past few months due to shortness of breath and cough.  Most recent ER visit was on 4/10/17.  Chest x-ray was negative.  Labs were negative.  She was sent home from the ER.  Shortness of breath has improved.  Wheezing has also improved.  She is still complaining of cough but no fever or acute body ache.  She has history of ongoing upper and lower extremity pain for several years.  She twisted her ankle about 20 years ago and wondering if the pain is related to that  "incident.  No subsequent injuries.    Past Medical History:   Diagnosis Date     Anxiety      Arthritis      Asthma      Back pain      COPD (chronic obstructive pulmonary disease)      Depression      Diabetes mellitus      Generalized headaches      HTN (hypertension)      Pneumonia      Patient Active Problem List   Diagnosis     Acute asthma exacerbation     Asthma exacerbation     Hyperglycemia     HTN (hypertension)     Moderate persistent asthma with acute exacerbation     Insomnia     Pain     Muscle cramps     GERD (gastroesophageal reflux disease)     Persistent asthma     Essential hypertension       Allergies:  No Known Allergies    History   Smoking Status     Former Smoker   Smokeless Tobacco     Never Used       Review of systems otherwise negative except as listed in HPI.   History   Smoking Status     Former Smoker   Smokeless Tobacco     Never Used       OBJECTICE: /84 (Patient Site: Right Arm, Patient Position: Sitting, Cuff Size: Adult Regular)  Pulse 80  Temp 97.6  F (36.4  C) (Oral)   Resp 16  Ht 5' 3\" (1.6 m)  Wt 129 lb (58.5 kg)  BMI 22.85 kg/m2    DATA REVIEWED:  Additional History from Old Records Summarized (2):       GEN-alert,  in no apparent distress.  HEENT-mucous membranes are moist, neck is supple.  CV-regular rate and rhythm with no murmur.   RESP-no wheezing today.  ABDOMEN- Soft , not tender.  EXTREM- No edema.  Some tenderness to left forearm, no lesions  deformities or swelling.  SKIN-no unusual rashes.        Adrien Cardoza   4/14/2017   This transcription uses voice recognition software, which may contain typographical errors.      " .

## 2023-02-02 NOTE — TELEPHONE ENCOUNTER
Adrien Cardoza MD  [facility]Ob Support Pool  Caller: Unspecified (Yesterday,  9:49 AM)  Sucralfate liquid form sent.   Please let the patient know.     Dr. Adrien Cardoza   2/2/2023 7:06 AM             Called and spoke to Billy Amin - daughter in law (C2C on file) relay provider message above.  No further questions.    PAULA PratherN, RN  [facility]

## 2023-02-04 ENCOUNTER — OFFICE VISIT (OUTPATIENT)
Dept: FAMILY MEDICINE | Facility: CLINIC | Age: 53
End: 2023-02-04
Payer: COMMERCIAL

## 2023-02-04 VITALS
WEIGHT: 127 LBS | HEART RATE: 82 BPM | SYSTOLIC BLOOD PRESSURE: 122 MMHG | RESPIRATION RATE: 16 BRPM | OXYGEN SATURATION: 99 % | TEMPERATURE: 98.1 F | BODY MASS INDEX: 26.54 KG/M2 | DIASTOLIC BLOOD PRESSURE: 81 MMHG

## 2023-02-04 DIAGNOSIS — K14.0 GLOSSITIS: ICD-10-CM

## 2023-02-04 DIAGNOSIS — K04.7 DENTAL INFECTION: Primary | ICD-10-CM

## 2023-02-04 PROCEDURE — 99214 OFFICE O/P EST MOD 30 MIN: CPT | Performed by: FAMILY MEDICINE

## 2023-02-04 RX ORDER — CHLORHEXIDINE GLUCONATE ORAL RINSE 1.2 MG/ML
15 SOLUTION DENTAL 2 TIMES DAILY
Qty: 300 ML | Refills: 0 | Status: SHIPPED | OUTPATIENT
Start: 2023-02-04 | End: 2023-02-14

## 2023-02-04 RX ORDER — IBUPROFEN 600 MG/1
600 TABLET, FILM COATED ORAL EVERY 8 HOURS PRN
Qty: 30 TABLET | Refills: 0 | Status: SHIPPED | OUTPATIENT
Start: 2023-02-04 | End: 2023-03-21

## 2023-02-04 RX ORDER — ACETAMINOPHEN 500 MG
1000 TABLET ORAL EVERY 8 HOURS PRN
Qty: 30 TABLET | Refills: 0 | Status: SHIPPED | OUTPATIENT
Start: 2023-02-04 | End: 2023-06-04

## 2023-02-04 NOTE — PROGRESS NOTES
ASSESSMENT/PLAN:      ICD-10-CM    1. Dental infection  K04.7 amoxicillin-clavulanate (AUGMENTIN) 875-125 MG tablet     ibuprofen (ADVIL/MOTRIN) 600 MG tablet     acetaminophen (TYLENOL) 500 MG tablet     chlorhexidine (PERIDEX) 0.12 % solution      2. Glossitis  K14.0 chlorhexidine (PERIDEX) 0.12 % solution                Reviewed medication instructions and side effects. Follow up if experiences side effects.     I reviewed supportive care, otc meds to use if needed, expected course, and signs of concern.  Follow up as needed or if she does not improve within  1-2 days or if worsens in any way.  Reviewed red flag symptoms and is to go to the ER if experiences any of these.     Due to language barrier, daughter in law of patient served as an  and was present during the history-taking and subsequent discussion and physical exam with this patient.    The use of Dragon/TOPSEC dictation services may have been used to construct the content in this note; any grammatical or spelling errors are non-intentional. Please contact the author of this note directly if you are in need of any clarification.      On the day of the encounter, time spend on chart review, patient visit, review of testing, documentation was 30  minutes          Patient Instructions     For pain    Start  Ibuprofen (motrin/advil) 600 mg 3 times a day always take with food for the next 3 to 5 days until pain resolves-maximum dose of ibuprofen is 2400 mg in 24 hours -no longer than 5 days     Can alternate with     Acetaminophen (Tylenol ) 1000 mg 3 times a day for the next 3 to 5 days until pain resolves-maximum dose of acetaminophen (tylenol)  is 3000 mg in 24-hours -no longer than 5 days       Schedule appointment with dentist on Monday/2/6/2023 walk in clinic     If fever, neck or facial swelling is worse to ER                Patient presents with:  Dental Problem: Teeth pain due to broken tooth. Bumps on tongue.       Subjective      Kulwant Amin is a 53 year old female who presents to clinic today for the following health issues:    HPI   Patient with onset of right lower molar dental pain after molar cracked and broken last p.m.,this am  pain is progressively gotten worse face is started to swell some tenderness into the right anterior neck, no fever no history of previous  Does have a regular dentist and can do walk-in care on Monday  Tongue feels raw and tender prominent red bumps in the back of the tongue        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     Microcytic anemia 11/17/2019     S/P coronary artery stent placement 02/02/2018     TB lung, latent     9 mos INH     Social History     Tobacco Use     Smoking status: Never     Smokeless tobacco: Never     Tobacco comments:     No passive exposure   Substance Use Topics     Alcohol use: No       Current Outpatient Medications   Medication Sig Dispense Refill     acetaminophen (TYLENOL) 500 MG tablet Take 2 tablets (1,000 mg) by mouth every 8 hours as needed for mild pain 30 tablet 0     acetaminophen (TYLENOL) 500 MG tablet TAKE 1 PILL BY MOUTH EVERY 6 HOURS AS NEEDED FOR PAIN 100 tablet 10     albuterol (PROAIR HFA/PROVENTIL HFA/VENTOLIN HFA) 108 (90 Base) MCG/ACT inhaler Inhale 2 puffs into the lungs every 4 hours as needed for shortness of breath / dyspnea 4 g 11     albuterol (PROVENTIL) (2.5 MG/3ML) 0.083% neb solution Take 1 vial (2.5 mg) by  nebulization every 6 hours as needed 90 mL 11     ALLERGY RELIEF 10 MG tablet TAKE 1 TABLET (10 MG TOTAL) BY MOUTH DAILY FOR ALLERGIES 30 tablet 2     amitriptyline (ELAVIL) 10 MG tablet TAKE 2 TABLETS (20 MG TOTAL) BY MOUTH AT BEDTIME. 180 tablet 3     amoxicillin-clavulanate (AUGMENTIN) 875-125 MG tablet Take 1 tablet by mouth 2 times daily for 10 days 20 tablet 0     ASPIRIN LOW DOSE 81 MG EC tablet TAKE 1 TABLET (81 MG TOTAL) BY MOUTH DAILY. 90 tablet 3     atorvastatin (LIPITOR) 80 MG tablet TAKE 1 TABLET (80 MG TOTAL) BY MOUTH AT BEDTIME FOR CHOLESTEROL 90 tablet 3     B-D U/F 31G X 8 MM insulin pen needle USE ONE PEN NEEDLE DAILY AS NEEDED FOR  each 1     calcium carbonate (TUMS) 500 MG chewable tablet Take 1 chew tab by mouth 2 times daily as needed       chlorhexidine (PERIDEX) 0.12 % solution Swish and spit 15 mLs in mouth 2 times daily for 10 days 300 mL 0     Continuous Blood Gluc Sensor (FREESTYLE MONICA 2 SENSOR) [facility] [place] each every 14 days Use 1 sensor every 14 days. Use to read blood sugars per 's instructions. 2 each 11     CONTOUR NEXT TEST test strip USE 1 EACH AS DIRECTED 3 (THREE) TIMES A DAY. 50 strip 11     cyclobenzaprine (FLEXERIL) 5 MG tablet Take 1-2 tablets (5-10 mg) by mouth 3 times daily as needed for muscle spasms 30 tablet 3     dextromethorphan (DELSYM) 30 MG/5ML liquid Take 10 mLs (60 mg) by mouth 2 times daily as needed for cough 178 mL 0     diclofenac (VOLTAREN) 1 % topical gel Apply 4 g topically 4 times daily 350 g 3     dupilumab (DUPIXENT) 300 MG/2ML prefilled pen Inject 2 mLs (300 mg) Subcutaneous every 14 days 4 mL 3     famotidine (PEPCID) 20 MG tablet TAKE 1 TABLET (20 MG) BY MOUTH 2 TIMES DAILY FOR STOMACH 180 tablet 3     FLUoxetine (PROZAC) 10 MG capsule Take 1 capsule (10 mg) by mouth daily 90 capsule 1     fluticasone-vilanterol (BREO ELLIPTA) 200-25 MCG/INH inhaler Inhale 1 puff into the lungs daily 60 each 11     gabapentin (NEURONTIN) 300 MG  capsule Take 2 capsules (600 mg) by mouth 3 times daily 540 capsule 3     Global Inject Ease Lancets 30G MISC USE 1 EACH AS DIRECTED 3 (THREE) TIMES A DAY. DISPENSE BRAND PER PATIENT'S INSURANCE AT PHARMACY DISCRETION.(E11.9) 100 each 3     guaiFENesin (ROBITUSSIN) 100 MG/5ML liquid Take 10 mLs (200 mg) by mouth every 4 hours as needed for cough 200 mL 1     hydrochlorothiazide (MICROZIDE) 12.5 MG capsule TAKE 1 CAPSULE (12.5 MG TOTAL) BY MOUTH DAILY FOR BLOOD PRESSURE 90 capsule 3     hydrocortisone (CORTAID) 1 % external cream Apply topically 2 times daily 60 g 0     ibuprofen (ADVIL/MOTRIN) 600 MG tablet Take 1 tablet (600 mg) by mouth every 8 hours as needed (pain) 30 tablet 0     insulin aspart (NOVOLOG PEN) 100 UNIT/ML pen Inject 10 Units Subcutaneous 2 times daily (with meals) 15 mL 3     insulin glargine (LANTUS PEN) 100 UNIT/ML pen Inject 40 Units Subcutaneous every morning 45 mL 3     insulin pen needle (32G X 4 MM) 32G X 4 MM miscellaneous Use one pen needles daily or as directed. 200 each 11     ipratropium - albuterol 0.5 mg/2.5 mg/3 mL (DUONEB) 0.5-2.5 (3) MG/3ML neb solution Take 1 vial (3 mLs) by nebulization every 6 hours as needed for shortness of breath / dyspnea or wheezing 360 mL 11     lidocaine (XYLOCAINE) 5 % external ointment Apply topically 3 times daily as needed Small amount (finger tip amount) to chest tid prn pain 35.44 g 0     meclizine (ANTIVERT) 12.5 MG tablet Take 2 tablets (25 mg) by mouth 3 times daily as needed for dizziness 30 tablet 0     metFORMIN (GLUCOPHAGE) 1000 MG tablet Take 1 tablet (1,000 mg) by mouth 2 times daily (with meals) 180 tablet 1     metoprolol tartrate (LOPRESSOR) 25 MG tablet TAKE 1 TABLET (25 MG TOTAL) BY MOUTH 2 (TWO) TIMES A DAY FOR BLOOD PRESSURE 180 tablet 3     montelukast (SINGULAIR) 10 MG tablet Take 1 tablet (10 mg) by mouth At Bedtime 30 tablet 6     nystatin (MYCOSTATIN) 966684 UNIT/ML suspension Take 5 mLs (500,000 Units) by mouth 4 times daily  473 mL 1     omeprazole (PRILOSEC) 40 MG DR capsule TAKE 1 CAPSULE (40 MG) BY MOUTH DAILY 90 capsule 3     Polyethylene Glycol 3350 (PEG 3350) 17 GM/SCOOP POWD MIX 17 GRAMS WITH LIQUID AND DRINK ONCE DAILY FOR CONSTIPATION 510 g 12     polyvinyl alcohol (ARTIFICIAL TEARS) 1.4 % ophthalmic solution Place 1 drop into both eyes as needed for dry eyes 15 mL 3     predniSONE (DELTASONE) 20 MG tablet Take 2 tabs daily x 7 days 14 tablet 0     sucralfate (CARAFATE) 1 GM tablet TAKE 1 TABLET (1 G TOTAL) BY MOUTH 4 (FOUR) TIMES A DAY BEFORE MEALS AND AT BEDTIME. TAKE 1 HOUR PRIOR TO MEALS Strength: 1 g 120 tablet 11     sucralfate (CARAFATE) 1 GM/10ML suspension Take 10 mLs (1 g) by mouth 4 times daily 414 mL 0     tiotropium (SPIRIVA RESPIMAT) 2.5 MCG/ACT inhaler Inhale 2 puffs into the lungs daily 4 g 1     No Known Allergies          ROS are negative, except as otherwise noted HPI      Objective    /81   Pulse 82   Temp 98.1  F (36.7  C) (Oral)   Resp 16   Wt 57.6 kg (127 lb)   SpO2 99%   BMI 26.54 kg/m    Body mass index is 26.54 kg/m .  Physical Exam   GENERAL:  alert and mild distress  HEENT-mouth-right lower gingiva inflamed 2 broken molars, tongue thickened coating with prominent red raised posterior papillae  NECK: no adenopathy, mild tender right submandibular soft tissue at angle of mandible, no  asymmetry,   MS: no gross musculoskeletal defects noted, no edema  NEURO: Normal strength and tone, mentation intact and speech normal, normal  gait       Diagnostic Test Results:  Labs reviewed in Epic  No results found for any visits on 02/04/23.

## 2023-02-04 NOTE — PATIENT INSTRUCTIONS
For pain    Start  Ibuprofen (motrin/advil) 600 mg 3 times a day always take with food for the next 3 to 5 days until pain resolves-maximum dose of ibuprofen is 2400 mg in 24 hours -no longer than 5 days     Can alternate with     Acetaminophen (Tylenol ) 1000 mg 3 times a day for the next 3 to 5 days until pain resolves-maximum dose of acetaminophen (tylenol)  is 3000 mg in 24-hours -no longer than 5 days       Schedule appointment with dentist on Monday/2/6/2023 walk in clinic     If fever, neck or facial swelling is worse to ER

## 2023-02-05 ENCOUNTER — APPOINTMENT (OUTPATIENT)
Dept: CT IMAGING | Facility: HOSPITAL | Age: 53
End: 2023-02-05
Attending: EMERGENCY MEDICINE
Payer: COMMERCIAL

## 2023-02-05 ENCOUNTER — HOSPITAL ENCOUNTER (EMERGENCY)
Facility: HOSPITAL | Age: 53
Discharge: HOME | End: 2023-02-05
Attending: EMERGENCY MEDICINE | Admitting: EMERGENCY MEDICINE
Payer: COMMERCIAL

## 2023-02-05 ENCOUNTER — APPOINTMENT (OUTPATIENT)
Dept: RADIOLOGY | Facility: HOSPITAL | Age: 53
End: 2023-02-05
Attending: EMERGENCY MEDICINE
Payer: COMMERCIAL

## 2023-02-05 VITALS
HEIGHT: 60 IN | BODY MASS INDEX: 24.15 KG/M2 | DIASTOLIC BLOOD PRESSURE: 82 MMHG | TEMPERATURE: 97.2 F | OXYGEN SATURATION: 94 % | HEART RATE: 95 BPM | RESPIRATION RATE: 18 BRPM | SYSTOLIC BLOOD PRESSURE: 138 MMHG | WEIGHT: 123 LBS

## 2023-02-05 DIAGNOSIS — K08.89 PAIN, DENTAL: ICD-10-CM

## 2023-02-05 DIAGNOSIS — R07.9 CHEST PAIN, UNSPECIFIED TYPE: ICD-10-CM

## 2023-02-05 LAB
ANION GAP SERPL CALCULATED.3IONS-SCNC: 11 MMOL/L (ref 7–15)
BASOPHILS # BLD AUTO: 0 10E3/UL (ref 0–0.2)
BASOPHILS NFR BLD AUTO: 0 %
BUN SERPL-MCNC: 10.6 MG/DL (ref 6–20)
CALCIUM SERPL-MCNC: 9.5 MG/DL (ref 8.6–10)
CHLORIDE SERPL-SCNC: 102 MMOL/L (ref 98–107)
CREAT SERPL-MCNC: 0.58 MG/DL (ref 0.51–0.95)
DEPRECATED HCO3 PLAS-SCNC: 25 MMOL/L (ref 22–29)
EOSINOPHIL # BLD AUTO: 1 10E3/UL (ref 0–0.7)
EOSINOPHIL NFR BLD AUTO: 11 %
ERYTHROCYTE [DISTWIDTH] IN BLOOD BY AUTOMATED COUNT: 15.1 % (ref 10–15)
GFR SERPL CREATININE-BSD FRML MDRD: >90 ML/MIN/1.73M2
GLUCOSE SERPL-MCNC: 156 MG/DL (ref 70–99)
HCT VFR BLD AUTO: 38.1 % (ref 35–47)
HGB BLD-MCNC: 11.8 G/DL (ref 11.7–15.7)
HOLD SPECIMEN: NORMAL
IMM GRANULOCYTES # BLD: 0 10E3/UL
IMM GRANULOCYTES NFR BLD: 0 %
LYMPHOCYTES # BLD AUTO: 1.3 10E3/UL (ref 0.8–5.3)
LYMPHOCYTES NFR BLD AUTO: 14 %
MAGNESIUM SERPL-MCNC: 1.8 MG/DL (ref 1.7–2.3)
MCH RBC QN AUTO: 25.7 PG (ref 26.5–33)
MCHC RBC AUTO-ENTMCNC: 31 G/DL (ref 31.5–36.5)
MCV RBC AUTO: 83 FL (ref 78–100)
MONOCYTES # BLD AUTO: 0.6 10E3/UL (ref 0–1.3)
MONOCYTES NFR BLD AUTO: 6 %
NEUTROPHILS # BLD AUTO: 6.4 10E3/UL (ref 1.6–8.3)
NEUTROPHILS NFR BLD AUTO: 69 %
NRBC # BLD AUTO: 0 10E3/UL
NRBC BLD AUTO-RTO: 0 /100
PLATELET # BLD AUTO: 228 10E3/UL (ref 150–450)
POTASSIUM SERPL-SCNC: 4.2 MMOL/L (ref 3.4–5.3)
RBC # BLD AUTO: 4.59 10E6/UL (ref 3.8–5.2)
SODIUM SERPL-SCNC: 138 MMOL/L (ref 136–145)
TROPONIN T SERPL HS-MCNC: <6 NG/L
WBC # BLD AUTO: 9.3 10E3/UL (ref 4–11)

## 2023-02-05 PROCEDURE — 85025 COMPLETE CBC W/AUTO DIFF WBC: CPT | Performed by: EMERGENCY MEDICINE

## 2023-02-05 PROCEDURE — 70491 CT SOFT TISSUE NECK W/DYE: CPT

## 2023-02-05 PROCEDURE — 36415 COLL VENOUS BLD VENIPUNCTURE: CPT | Performed by: STUDENT IN AN ORGANIZED HEALTH CARE EDUCATION/TRAINING PROGRAM

## 2023-02-05 PROCEDURE — 71046 X-RAY EXAM CHEST 2 VIEWS: CPT

## 2023-02-05 PROCEDURE — 83735 ASSAY OF MAGNESIUM: CPT | Performed by: EMERGENCY MEDICINE

## 2023-02-05 PROCEDURE — 250N000013 HC RX MED GY IP 250 OP 250 PS 637: Performed by: EMERGENCY MEDICINE

## 2023-02-05 PROCEDURE — 250N000011 HC RX IP 250 OP 636: Performed by: EMERGENCY MEDICINE

## 2023-02-05 PROCEDURE — 80048 BASIC METABOLIC PNL TOTAL CA: CPT | Performed by: EMERGENCY MEDICINE

## 2023-02-05 PROCEDURE — 84484 ASSAY OF TROPONIN QUANT: CPT | Performed by: EMERGENCY MEDICINE

## 2023-02-05 PROCEDURE — 99285 EMERGENCY DEPT VISIT HI MDM: CPT | Mod: 25

## 2023-02-05 PROCEDURE — 93005 ELECTROCARDIOGRAM TRACING: CPT | Performed by: STUDENT IN AN ORGANIZED HEALTH CARE EDUCATION/TRAINING PROGRAM

## 2023-02-05 PROCEDURE — 93005 ELECTROCARDIOGRAM TRACING: CPT | Performed by: EMERGENCY MEDICINE

## 2023-02-05 RX ORDER — IOPAMIDOL 755 MG/ML
100 INJECTION, SOLUTION INTRAVASCULAR ONCE
Status: COMPLETED | OUTPATIENT
Start: 2023-02-05 | End: 2023-02-05

## 2023-02-05 RX ORDER — OXYCODONE HYDROCHLORIDE 5 MG/1
5 TABLET ORAL EVERY 6 HOURS PRN
Qty: 12 TABLET | Refills: 0 | Status: SHIPPED | OUTPATIENT
Start: 2023-02-05 | End: 2023-02-08

## 2023-02-05 RX ORDER — OXYCODONE HYDROCHLORIDE 5 MG/1
5 TABLET ORAL ONCE
Status: COMPLETED | OUTPATIENT
Start: 2023-02-05 | End: 2023-02-05

## 2023-02-05 RX ADMIN — OXYCODONE HYDROCHLORIDE 5 MG: 5 TABLET ORAL at 14:35

## 2023-02-05 RX ADMIN — IOPAMIDOL 100 ML: 755 INJECTION, SOLUTION INTRAVENOUS at 15:45

## 2023-02-05 ASSESSMENT — ACTIVITIES OF DAILY LIVING (ADL): ADLS_ACUITY_SCORE: 35

## 2023-02-05 NOTE — ED TRIAGE NOTES
The pt has had chest pain and right jaw pain, swelling in her neck that started on 2/4/2023. The pt's  is covid positive x 5 days. The pt has a hx COPD. No new cough. No other sx's.

## 2023-02-05 NOTE — ED PROVIDER NOTES
EMERGENCY DEPARTMENT ENCOUNTER      NAME: Kulwant Amin  AGE: 53 year old female  YOB: 1970  MRN: [id number]  EVALUATION DATE & TIME: 2/5/2023  1:52 PM    PCP: Adrien Cardoza    ED PROVIDER: Tish Ballard M.D.      Chief Complaint   Patient presents with     Chest Pain         FINAL IMPRESSION:  1. Pain, dental    2. Chest pain, unspecified type          ED COURSE & MEDICAL DECISION MAKING:    Pertinent Labs & Imaging studies reviewed. (See chart for details)  ED Course as of 02/05/23 2209   Sun Feb 05, 2023   1416 Reviewed the patient's office visit from yesterday.  She was diagnosed with a dental infection and glossitis and started on Augmentin, ibuprofen, Tylenol, and Peridex.  At that time she had been complaining of some right lower molar dental pain and right anterior neck pain.   1416 We will plan to check some basic blood work and EKG on the patient to evaluate for any signs of ACS given her history.  Since its been 2 days of symptoms I think if her initial troponin looks okay then a repeat is not going to be necessary.  As far as her tooth pain I will treat her symptomatically with some oxycodone.  We will check some basic labs and see if she has an elevated white count.  We will get CT imaging of the neck to look for any signs of an abscess.  I do not see any abscess on exam.  She does have some poor dentition and may have a periapical abscess under that broken tooth.  Ultimately she needs to follow-up with a dentist but we will make sure that we are not seeing anything emergent.  She is currently on Augmentin which is a good antibiotic for a dental infection.  We can reevaluate once everything comes back.  I will get a chest x-ray to look for other potential causes of chest pains such as pneumothorax.  I discussed the plan with the patient and family and they are in agreement.   1639 No acute abnormality was seen on imaging.  Troponin is negative.  I will discuss results and plan for discharge  with the patient and family.   1642 I discussed results and plan for discharge with the patient.  She is in agreement.  She did get some relief with the oxycodone and would like to go home with that.  She is due to follow-up with a dentist tomorrow.       Medical Decision Making    History:    Supplemental history from: Family Member/Significant Other    External Record(s) reviewed: Documented in chart, if applicable.    Work Up:    Chart documentation includes differential considered and any EKGs or imaging independently interpreted by provider, where specified.    In additional to work up documented, I considered the following work up: Documented in chart, if applicable.    External consultation:    Discussion of management with another provider: Documented in chart, if applicable    Complicating factors:    Care impacted by chronic illness: Heart Disease    Care affected by social determinants of health: Access to Medical Care    Disposition considerations: Discharge. I prescribed additional prescription strength medication(s) as charted. See documentation for any additional details.        At the conclusion of the encounter I discussed  the results of all of the tests and the disposition with patient.   All questions were answered.  The patient acknowledged understanding and was involved in the decision making regarding the overall care plan.      I discussed with patient the utility, limitations and findings of the exam/interventions/studies done during this visit as well as the list of differential diagnosis and symptoms to monitor/return to ER for.  Additional verbal discharge instructions were provided.     MEDICATIONS GIVEN IN THE EMERGENCY:  Medications   oxyCODONE (ROXICODONE) tablet 5 mg (5 mg Oral Given 2/5/23 1435)   iopamidol (ISOVUE-370) solution 100 mL (100 mLs Intravenous Given 2/5/23 1545)       NEW PRESCRIPTIONS STARTED AT TODAY'S ER VISIT  Discharge Medication List as of 2/5/2023  4:49 PM       START taking these medications    Details   oxyCODONE (ROXICODONE) 5 MG tablet Take 1 tablet (5 mg) by mouth every 6 hours as needed for pain, Disp-12 tablet, R-0, E-Prescribe                =================================================================    HPI    Triage Note: The pt has had chest pain and right jaw pain, swelling in her neck that started on 2/4/2023. The pt's  is covid positive x 5 days. The pt has a hx COPD. No new cough. No other sx's.    Patient information was obtained from: Patient and daughter-in-law    Use of : Yes daughter-in-law is interpreting- language English      Kulwant Amin is a 53 year old female who presents for evaluation of some pain in her right lower jaw.  Specifically over one of her right lower molars.  She also complains of little bit of a sore throat and some chest pain.  Symptoms been going on for 2 days.  She was seen at the walk-in clinic yesterday and started on antibiotics.  She has been taking Tylenol and ibuprofen without enough relief of her symptoms.  She feels like there is increased swelling of the right side of her face and neck.  It hurts to swallow but she is not having any difficulty breathing or swallowing.  She also complains of chest pain that started in the morning 2 days ago.  Its been pretty constant.  This is the same type of chest pain she had before.  She has a known history of coronary disease.  She has some baseline shortness of breath that has not changed.  This is related to her COPD.  She denies any fevers or chills.  She denies any nausea or vomiting.  Right now she feels like the pain is a little bit better and she really just wants to get that managed.  They did not do any imaging yesterday.    PAST MEDICAL HISTORY:  Past Medical History:   Diagnosis Date     Acute asthma exacerbation 01/06/2020     Anxiety      Arthritis      Asthma exacerbation 11/19/2015     Chronic obstructive pulmonary disease, unspecified COPD type  (H)      COPD (chronic obstructive pulmonary disease) (H)      Coronary artery disease due to lipid rich plaque      Depression      Epigastric pain 12/15/2021     Essential hypertension      GERD (gastroesophageal reflux disease)      Infection due to 2019 novel coronavirus 01/03/2022    positive with COVID-19 on January 3, 2022     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     Microcytic anemia 11/17/2019     S/P coronary artery stent placement 02/02/2018     TB lung, latent     9 mos INH       PAST SURGICAL HISTORY:  Past Surgical History:   Procedure Laterality Date     CORONARY STENT PLACEMENT  2018     CV CORONARY ANGIOGRAM N/A 1/25/2018    Procedure: Coronary Angiogram;  Surgeon: Angelo Serrano MD;  Location: [facility] Cath Lab;  Service:      CV CORONARY ANGIOGRAM N/A 5/5/2022    Procedure: Coronary Angiogram;  Surgeon: Irwin Cano MD;  Location: [facility] CATH LAB CV     CV CORONARY ANGIOGRAM  5/5/2022    Procedure: ;  Surgeon: Irwin Cano MD;  Location: [facility] LAB CV     KS ESOPHAGOGASTRODUODENOSCOPY TRANSORAL DIAGNOSTIC N/A 12/10/2018    Procedure: ESOPHAGOGASTRODUODENOSCOPY (EGD);  Surgeon: Eddie Renteria MD;  Location: [facility];  Service: Gastroenterology     KS ESOPHAGOGASTRODUODENOSCOPY TRANSORAL DIAGNOSTIC N/A 12/3/2020    Procedure: ESOPHAGOGASTRODUODENOSCOPY (EGD) with biospies ;  Surgeon: Avi Crow MD;  Location: [facility];  Service: Gastroenterology     [facility] COLONOSCOPY W/WO BRUSH/WASH N/A 12/10/2018    Procedure: COLONOSCOPY with polypectomy using biopsy forceps;  Surgeon: Eddie Renteria MD;  Location: [facility];  Service: Gastroenterology       CURRENT MEDICATIONS:    No current facility-administered medications for this encounter.    Current Outpatient Medications:      oxyCODONE (ROXICODONE) 5 MG tablet, Take 1 tablet (5 mg) by mouth every 6 hours as needed for pain, Disp: 12 tablet, Rfl: 0     acetaminophen (TYLENOL) 500 MG tablet, Take 2 tablets  (1,000 mg) by mouth every 8 hours as needed for mild pain, Disp: 30 tablet, Rfl: 0     acetaminophen (TYLENOL) 500 MG tablet, TAKE 1 PILL BY MOUTH EVERY 6 HOURS AS NEEDED FOR PAIN, Disp: 100 tablet, Rfl: 10     albuterol (PROAIR HFA/PROVENTIL HFA/VENTOLIN HFA) 108 (90 Base) MCG/ACT inhaler, Inhale 2 puffs into the lungs every 4 hours as needed for shortness of breath / dyspnea, Disp: 4 g, Rfl: 11     albuterol (PROVENTIL) (2.5 MG/3ML) 0.083% neb solution, Take 1 vial (2.5 mg) by nebulization every 6 hours as needed, Disp: 90 mL, Rfl: 11     ALLERGY RELIEF 10 MG tablet, TAKE 1 TABLET (10 MG TOTAL) BY MOUTH DAILY FOR ALLERGIES, Disp: 30 tablet, Rfl: 2     amitriptyline (ELAVIL) 10 MG tablet, TAKE 2 TABLETS (20 MG TOTAL) BY MOUTH AT BEDTIME., Disp: 180 tablet, Rfl: 3     amoxicillin-clavulanate (AUGMENTIN) 875-125 MG tablet, Take 1 tablet by mouth 2 times daily for 10 days, Disp: 20 tablet, Rfl: 0     ASPIRIN LOW DOSE 81 MG EC tablet, TAKE 1 TABLET (81 MG TOTAL) BY MOUTH DAILY., Disp: 90 tablet, Rfl: 3     atorvastatin (LIPITOR) 80 MG tablet, TAKE 1 TABLET (80 MG TOTAL) BY MOUTH AT BEDTIME FOR CHOLESTEROL, Disp: 90 tablet, Rfl: 3     B-D U/F 31G X 8 MM insulin pen needle, USE ONE PEN NEEDLE DAILY AS NEEDED FOR SSI, Disp: 100 each, Rfl: 1     calcium carbonate (TUMS) 500 MG chewable tablet, Take 1 chew tab by mouth 2 times daily as needed, Disp: , Rfl:      chlorhexidine (PERIDEX) 0.12 % solution, Swish and spit 15 mLs in mouth 2 times daily for 10 days, Disp: 300 mL, Rfl: 0     Continuous Blood Gluc Sensor (FREESTYLE MONICA 2 SENSOR) [facility], 1 each every 14 days Use 1 sensor every 14 days. Use to read blood sugars per 's instructions., Disp: 2 each, Rfl: 11     CONTOUR NEXT TEST test strip, USE 1 EACH AS DIRECTED 3 (THREE) TIMES A DAY., Disp: 50 strip, Rfl: 11     cyclobenzaprine (FLEXERIL) 5 MG tablet, Take 1-2 tablets (5-10 mg) by mouth 3 times daily as needed for muscle spasms, Disp: 30 tablet, Rfl: 3      dextromethorphan (DELSYM) 30 MG/5ML liquid, Take 10 mLs (60 mg) by mouth 2 times daily as needed for cough, Disp: 178 mL, Rfl: 0     diclofenac (VOLTAREN) 1 % topical gel, Apply 4 g topically 4 times daily, Disp: 350 g, Rfl: 3     dupilumab (DUPIXENT) 300 MG/2ML prefilled pen, Inject 2 mLs (300 mg) Subcutaneous every 14 days, Disp: 4 mL, Rfl: 3     famotidine (PEPCID) 20 MG tablet, TAKE 1 TABLET (20 MG) BY MOUTH 2 TIMES DAILY FOR STOMACH, Disp: 180 tablet, Rfl: 3     FLUoxetine (PROZAC) 10 MG capsule, Take 1 capsule (10 mg) by mouth daily, Disp: 90 capsule, Rfl: 1     fluticasone-vilanterol (BREO ELLIPTA) 200-25 MCG/INH inhaler, Inhale 1 puff into the lungs daily, Disp: 60 each, Rfl: 11     gabapentin (NEURONTIN) 300 MG capsule, Take 2 capsules (600 mg) by mouth 3 times daily, Disp: 540 capsule, Rfl: 3     Global Inject Ease Lancets 30G MISC, USE 1 EACH AS DIRECTED 3 (THREE) TIMES A DAY. DISPENSE BRAND PER PATIENT'S INSURANCE AT PHARMACY DISCRETION.(E11.9), Disp: 100 each, Rfl: 3     guaiFENesin (ROBITUSSIN) 100 MG/5ML liquid, Take 10 mLs (200 mg) by mouth every 4 hours as needed for cough, Disp: 200 mL, Rfl: 1     hydrochlorothiazide (MICROZIDE) 12.5 MG capsule, TAKE 1 CAPSULE (12.5 MG TOTAL) BY MOUTH DAILY FOR BLOOD PRESSURE, Disp: 90 capsule, Rfl: 3     hydrocortisone (CORTAID) 1 % external cream, Apply topically 2 times daily, Disp: 60 g, Rfl: 0     ibuprofen (ADVIL/MOTRIN) 600 MG tablet, Take 1 tablet (600 mg) by mouth every 8 hours as needed (pain), Disp: 30 tablet, Rfl: 0     insulin aspart (NOVOLOG PEN) 100 UNIT/ML pen, Inject 10 Units Subcutaneous 2 times daily (with meals), Disp: 15 mL, Rfl: 3     insulin glargine (LANTUS PEN) 100 UNIT/ML pen, Inject 40 Units Subcutaneous every morning, Disp: 45 mL, Rfl: 3     insulin pen needle (32G X 4 MM) 32G X 4 MM miscellaneous, Use one pen needles daily or as directed., Disp: 200 each, Rfl: 11     ipratropium - albuterol 0.5 mg/2.5 mg/3 mL (DUONEB) 0.5-2.5 (3) MG/3ML  neb solution, Take 1 vial (3 mLs) by nebulization every 6 hours as needed for shortness of breath / dyspnea or wheezing, Disp: 360 mL, Rfl: 11     lidocaine (XYLOCAINE) 5 % external ointment, Apply topically 3 times daily as needed Small amount (finger tip amount) to chest tid prn pain, Disp: 35.44 g, Rfl: 0     meclizine (ANTIVERT) 12.5 MG tablet, Take 2 tablets (25 mg) by mouth 3 times daily as needed for dizziness, Disp: 30 tablet, Rfl: 0     metFORMIN (GLUCOPHAGE) 1000 MG tablet, Take 1 tablet (1,000 mg) by mouth 2 times daily (with meals), Disp: 180 tablet, Rfl: 1     metoprolol tartrate (LOPRESSOR) 25 MG tablet, TAKE 1 TABLET (25 MG TOTAL) BY MOUTH 2 (TWO) TIMES A DAY FOR BLOOD PRESSURE, Disp: 180 tablet, Rfl: 3     montelukast (SINGULAIR) 10 MG tablet, Take 1 tablet (10 mg) by mouth At Bedtime, Disp: 30 tablet, Rfl: 6     nystatin (MYCOSTATIN) 715235 UNIT/ML suspension, Take 5 mLs (500,000 Units) by mouth 4 times daily, Disp: 473 mL, Rfl: 1     omeprazole (PRILOSEC) 40 MG DR capsule, TAKE 1 CAPSULE (40 MG) BY MOUTH DAILY, Disp: 90 capsule, Rfl: 3     Polyethylene Glycol 3350 (PEG 3350) 17 GM/SCOOP POWD, MIX 17 GRAMS WITH LIQUID AND DRINK ONCE DAILY FOR CONSTIPATION, Disp: 510 g, Rfl: 12     polyvinyl alcohol (ARTIFICIAL TEARS) 1.4 % ophthalmic solution, Place 1 drop into both eyes as needed for dry eyes, Disp: 15 mL, Rfl: 3     predniSONE (DELTASONE) 20 MG tablet, Take 2 tabs daily x 7 days, Disp: 14 tablet, Rfl: 0     sucralfate (CARAFATE) 1 GM tablet, TAKE 1 TABLET (1 G TOTAL) BY MOUTH 4 (FOUR) TIMES A DAY BEFORE MEALS AND AT BEDTIME. TAKE 1 HOUR PRIOR TO MEALS Strength: 1 g, Disp: 120 tablet, Rfl: 11     sucralfate (CARAFATE) 1 GM/10ML suspension, Take 10 mLs (1 g) by mouth 4 times daily, Disp: 414 mL, Rfl: 0     tiotropium (SPIRIVA RESPIMAT) 2.5 MCG/ACT inhaler, Inhale 2 puffs into the lungs daily, Disp: 4 g, Rfl: 1    ALLERGIES:  No Known Allergies    FAMILY HISTORY:  Family History   Problem Relation  Age of Onset     Other - See Comments Mother          of an intestinal problem     Ulcers Father          of gastritis     Breast Cancer No family hx of        SOCIAL HISTORY:   Social History     Socioeconomic History     Marital status:    Tobacco Use     Smoking status: Never     Smokeless tobacco: Never     Tobacco comments:     No passive exposure   Substance and Sexual Activity     Alcohol use: No     Drug use: No     Sexual activity: Yes     Partners: Male   Social History Narrative    2017 The patient lives with her daughter-in-law (who is present), , son, and 2 grandchildren (total of 6 people). [place].       PHYSICAL EXAM    VITAL SIGNS: /82   Pulse 95   Temp 97.2  F (36.2  C) (Temporal)   Resp 18   Ht 1.524 m (5')   Wt 55.8 kg (123 lb)   SpO2 94%   BMI 24.02 kg/m     GENERAL: Awake, alert, answering questions appropriately, generally poor dentition but broken tooth #23 with tenderness of that tooth.  No palpable or visible abscess.  No bleeding in the mouth.  No significant swelling to the face or neck.  No drooling, no trismus, handling secretions without difficulty.  SPEECH: Talking with daughter-in-law in Serbian at a normal volume  PULMONARY: No respiratory distress, Lungs clear to auscultation bilaterally  CARDIOVASCULAR: Regular rate and rhythm, Distal pulses present and normal.  ABDOMINAL: Soft, Nondistended, Nontender, No rebound or guarding, No palpable masses  EXTREMITIES: No lower extremity edema.  PSYCH: Normal mood and affect     LAB:  All pertinent labs reviewed and interpreted.  Results for orders placed or performed during the hospital encounter of 23   XR Chest 2 Views    Impression    IMPRESSION: No acute cardiopulmonary abnormality.   Soft tissue neck CT w contrast    Impression    IMPRESSION:  1.  Normal appearance of the pharynx, larynx, and oral cavity. No neck mass, lymphadenopathy, or fluid collection.       Extra Red Top Tube    Result Value Ref Range    Hold Specimen [facility]    Extra Green Top (Lithium Heparin) Tube   Result Value Ref Range    Hold Specimen [facility]    Extra Purple Top Tube   Result Value Ref Range    Hold Specimen [facility]    Extra Blood Bank Purple Top Tube   Result Value Ref Range    Hold Specimen [facility]    Basic metabolic panel   Result Value Ref Range    Sodium 138 136 - 145 mmol/L    Potassium 4.2 3.4 - 5.3 mmol/L    Chloride 102 98 - 107 mmol/L    Carbon Dioxide (CO2) 25 22 - 29 mmol/L    Anion Gap 11 7 - 15 mmol/L    Urea Nitrogen 10.6 6.0 - 20.0 mg/dL    Creatinine 0.58 0.51 - 0.95 mg/dL    Calcium 9.5 8.6 - 10.0 mg/dL    Glucose 156 (H) 70 - 99 mg/dL    GFR Estimate >90 >60 mL/min/1.73m2   Result Value Ref Range    Troponin T, High Sensitivity <6 <=14 ng/L   Result Value Ref Range    Magnesium 1.8 1.7 - 2.3 mg/dL   CBC with platelets and differential   Result Value Ref Range    WBC Count 9.3 4.0 - 11.0 10e3/uL    RBC Count 4.59 3.80 - 5.20 10e6/uL    Hemoglobin 11.8 11.7 - 15.7 g/dL    Hematocrit 38.1 35.0 - 47.0 %    MCV 83 78 - 100 fL    MCH 25.7 (L) 26.5 - 33.0 pg    MCHC 31.0 (L) 31.5 - 36.5 g/dL    RDW 15.1 (H) 10.0 - 15.0 %    Platelet Count 228 150 - 450 10e3/uL    % Neutrophils 69 %    % Lymphocytes 14 %    % Monocytes 6 %    % Eosinophils 11 %    % Basophils 0 %    % Immature Granulocytes 0 %    NRBCs per 100 WBC 0 <1 /100    Absolute Neutrophils 6.4 1.6 - 8.3 10e3/uL    Absolute Lymphocytes 1.3 0.8 - 5.3 10e3/uL    Absolute Monocytes 0.6 0.0 - 1.3 10e3/uL    Absolute Eosinophils 1.0 (H) 0.0 - 0.7 10e3/uL    Absolute Basophils 0.0 0.0 - 0.2 10e3/uL    Absolute Immature Granulocytes 0.0 <=0.4 10e3/uL    Absolute NRBCs 0.0 10e3/uL       RADIOLOGY:  XR Chest 2 Views   Final Result   IMPRESSION: No acute cardiopulmonary abnormality.      Soft tissue neck CT w contrast   Final Result   IMPRESSION:   1.  Normal appearance of the pharynx, larynx, and oral cavity. No neck mass, lymphadenopathy, or fluid collection.                 EKG:    Date and time: February 5, 2023 at 1337  Rate: 100 bpm  Rhythm: Sinus rhythm  UT interval: 146 ms  QRS interval: 88 ms  QT/QTc: 362/466 ms  ST changes or T wave changes: No acute ST or T wave abnormality  Change from prior ECG: No significant change from prior  I have independently reviewed and interpreted this EKG.     Tish Ballard M.D.  Emergency Medicine  [facility] EMERGENCY DEPARTMENT  [facility] [facility] [address]  [phone]  Dept: [phone]     Tish Ballard MD  02/05/23 1724

## 2023-02-05 NOTE — DISCHARGE INSTRUCTIONS
You were seen in the Emergency Department today for evaluation of dental pain and chest pain.  Your lab work showed no cause of your symptoms. Your imaging studies showed no significant cause of your symptoms. You were prescribed oxycodone for pain. You should take all medications as prescribed.  Follow up with your primary care physician to ensure resolution of symptoms. Return if you have new or worsening symptoms.

## 2023-02-10 DIAGNOSIS — Z91.09 ENVIRONMENTAL ALLERGIES: ICD-10-CM

## 2023-02-10 RX ORDER — MONTELUKAST SODIUM 10 MG/1
10 TABLET ORAL AT BEDTIME
Qty: 30 TABLET | Refills: 11 | Status: SHIPPED | OUTPATIENT
Start: 2023-02-10 | End: 2024-03-08

## 2023-02-15 ENCOUNTER — OFFICE VISIT (OUTPATIENT)
Dept: NEUROLOGY | Facility: CLINIC | Age: 53
End: 2023-02-15
Payer: COMMERCIAL

## 2023-02-15 VITALS
SYSTOLIC BLOOD PRESSURE: 116 MMHG | HEART RATE: 82 BPM | DIASTOLIC BLOOD PRESSURE: 74 MMHG | WEIGHT: 123.6 LBS | BODY MASS INDEX: 24.14 KG/M2

## 2023-02-15 DIAGNOSIS — R42 LIGHT HEADED: ICD-10-CM

## 2023-02-15 DIAGNOSIS — R42 VERTIGO: ICD-10-CM

## 2023-02-15 DIAGNOSIS — I63.50 CEREBROVASCULAR ACCIDENT OF RIGHT PONTINE STRUCTURE (H): Primary | ICD-10-CM

## 2023-02-15 DIAGNOSIS — G44.40 MEDICATION OVERUSE HEADACHE: ICD-10-CM

## 2023-02-15 DIAGNOSIS — G43.719 INTRACTABLE CHRONIC MIGRAINE WITHOUT AURA AND WITHOUT STATUS MIGRAINOSUS: ICD-10-CM

## 2023-02-15 PROCEDURE — 99417 PROLNG OP E/M EACH 15 MIN: CPT | Performed by: PSYCHIATRY & NEUROLOGY

## 2023-02-15 PROCEDURE — 99215 OFFICE O/P EST HI 40 MIN: CPT | Performed by: PSYCHIATRY & NEUROLOGY

## 2023-02-15 RX ORDER — AMITRIPTYLINE HYDROCHLORIDE 10 MG/1
TABLET ORAL
Qty: 270 TABLET | Refills: 0 | Status: SHIPPED | OUTPATIENT
Start: 2023-02-15 | End: 2023-05-24

## 2023-02-15 NOTE — NURSING NOTE
Kulwant Amin is a 53 year old female who presents for:  Chief Complaint   Patient presents with     Neurologic Problem     Transfer pt of Dr. Multani   Ongoing headache/dizziness         Initial Vitals:  /72   Pulse 82   Wt 56.1 kg (123 lb 9.6 oz)   BMI 24.14 kg/m   Estimated body mass index is 24.14 kg/m  as calculated from the following:    Height as of 2/5/23: 1.524 m (5').    Weight as of this encounter: 56.1 kg (123 lb 9.6 oz).. Body surface area is 1.54 meters squared. BP completed using cuff size: wrist cuff    Jonathan V. Jerry

## 2023-02-15 NOTE — LETTER
2/15/2023         RE: Kulwant Amin  [address] [facility] [address]        Dear Colleague,    Thank you for referring your patient, Kulwant Amin, to the [facility] [place]. Please see a copy of my visit note below.    NEUROLOGY OUTPATIENT CONSULT NOTE  Feb 15, 2023     CHIEF COMPLAINT/REASON FOR VISIT/REASON FOR CONSULT  Patient presents with:  Neurologic Problem: Transfer pt of Dr. Multani   Ongoing headache/dizziness     REASON FOR CONSULTATION- Multiple issues    REFERRAL SOURCE   Referred Self  CC  Referred Self    HISTORY OF PRESENT ILLNESS  Kulwant Amin is a 53 year old female seen today for evaluation of multiple issues.    In 2021 she was found to have a right pontine stroke.  Presenting symptoms of vertigo.  No clear etiology for the stroke was identified it was thought to be lacunar.  She does have a history of hypertension, hyperlipidemia, diabetes.  She was supposed to be on Plavix though currently is only on aspirin.  No recurrence of stroke symptoms.  She is presented to the ER since then with vertigo symptoms and her imaging studies have been negative    1.  She reports that the vertigo is there all the time.  She is using meclizine which she finds some benefit with. She has done vestibular rehab without any improvement.    2.  Further complains of headaches.  These are around-the-clock.  He is taking Tylenol every 8 hours to get rid of the headaches.  Headaches are more frontal.  They can be throbbing in nature with photophobia and phonophobia.  She does continue visual auras as well.  She is on amitriptyline at nighttime.  She feels that it might be helping with the headaches.  Does not get good sleep due to COPD.  Headaches started after her stroke.    3.  No other new strokelike symptoms.    4.  Does complain of some lightheadedness especially during the exam today.  Has been put on hydrochlorothiazide by her primary care provider.    Previous history  is reviewed and this is unchanged.    PAST MEDICAL/SURGICAL HISTORY  Past Medical History:   Diagnosis Date     Acute asthma exacerbation 2020     Anxiety      Arthritis      Asthma exacerbation 2015     Chronic obstructive pulmonary disease, unspecified COPD type (H)      COPD (chronic obstructive pulmonary disease) (H)      Coronary artery disease due to lipid rich plaque      Depression      Epigastric pain 12/15/2021     Essential hypertension      GERD (gastroesophageal reflux disease)      Infection due to  novel coronavirus 2022    positive with COVID-19 on January 3, 2022     Latent tuberculosis 2019     Microcytic anemia 2019     S/P coronary artery stent placement 2018     TB lung, latent     9 mos INH     Patient Active Problem List   Diagnosis     Pulmonary nodule, right     Environmental allergies     TB lung, latent     COPD (chronic obstructive pulmonary disease)/Asthma      Essential hypertension     Hyperthyroidism     Cataracts, bilateral     Corneal opacity     Irregular astigmatism     Anxiety     Chronic nonintractable headache, unspecified headache type     Coronary artery disease due to lipid rich plaque     Dizziness     Hyperlipidemia     Moderate major depression (H)     Moderate persistent asthma     Unspecified visual loss     GERD (gastroesophageal reflux disease)     Dental caries     H/O arterial ischemic stroke     Constipation     Dysphagia     Chronic abdominal pain     Insomnia     FLACO (obstructive sleep apnea)- mild (AHI 14)     Chest pain     Chest pain, unspecified type     Type 2 diabetes mellitus treated with insulin (H)   Significant for arthritis, diabetes, high blood pressure, hyperlipidemia, stroke    FAMILY HISTORY  Family History   Problem Relation Age of Onset     Other - See Comments Mother          of an intestinal problem     Ulcers Father          of gastritis     Breast Cancer No family hx of    Negative for  stroke    SOCIAL HISTORY  Social History     Tobacco Use     Smoking status: Never     Smokeless tobacco: Never     Tobacco comments:     No passive exposure   Substance Use Topics     Alcohol use: No     Drug use: No       SYSTEMS REVIEW  Twelve-system ROS was done and other than the HPI this was negative except for back pain, joint pain, numbness and tingling, weakness/paralysis, difficulty walking, balance/coordination problems, dizziness, headaches, anxiety, cardiac/heart problems, respiratory problems, snoring loudly.    MEDICATIONS  acetaminophen (TYLENOL) 500 MG tablet, Take 2 tablets (1,000 mg) by mouth every 8 hours as needed for mild pain  acetaminophen (TYLENOL) 500 MG tablet, TAKE 1 PILL BY MOUTH EVERY 6 HOURS AS NEEDED FOR PAIN  albuterol (PROAIR HFA/PROVENTIL HFA/VENTOLIN HFA) 108 (90 Base) MCG/ACT inhaler, Inhale 2 puffs into the lungs every 4 hours as needed for shortness of breath / dyspnea  albuterol (PROVENTIL) (2.5 MG/3ML) 0.083% neb solution, Take 1 vial (2.5 mg) by nebulization every 6 hours as needed  ALLERGY RELIEF 10 MG tablet, TAKE 1 TABLET (10 MG TOTAL) BY MOUTH DAILY FOR ALLERGIES  ASPIRIN LOW DOSE 81 MG EC tablet, TAKE 1 TABLET (81 MG TOTAL) BY MOUTH DAILY.  atorvastatin (LIPITOR) 80 MG tablet, TAKE 1 TABLET (80 MG TOTAL) BY MOUTH AT BEDTIME FOR CHOLESTEROL  B-D U/F 31G X 8 MM insulin pen needle, USE ONE PEN NEEDLE DAILY AS NEEDED FOR SSI  calcium carbonate (TUMS) 500 MG chewable tablet, Take 1 chew tab by mouth 2 times daily as needed  Continuous Blood Gluc Sensor (FREESTYLE MONICA 2 SENSOR) [facility], 1 each every 14 days Use 1 sensor every 14 days. Use to read blood sugars per 's instructions.  CONTOUR NEXT TEST test strip, USE 1 EACH AS DIRECTED 3 (THREE) TIMES A DAY.  cyclobenzaprine (FLEXERIL) 5 MG tablet, Take 1-2 tablets (5-10 mg) by mouth 3 times daily as needed for muscle spasms  dextromethorphan (DELSYM) 30 MG/5ML liquid, Take 10 mLs (60 mg) by mouth 2 times daily as  needed for cough  diclofenac (VOLTAREN) 1 % topical gel, Apply 4 g topically 4 times daily  dupilumab (DUPIXENT) 300 MG/2ML prefilled pen, Inject 2 mLs (300 mg) Subcutaneous every 14 days  famotidine (PEPCID) 20 MG tablet, TAKE 1 TABLET (20 MG) BY MOUTH 2 TIMES DAILY FOR STOMACH  FLUoxetine (PROZAC) 10 MG capsule, Take 1 capsule (10 mg) by mouth daily  fluticasone-vilanterol (BREO ELLIPTA) 200-25 MCG/INH inhaler, Inhale 1 puff into the lungs daily  gabapentin (NEURONTIN) 300 MG capsule, Take 2 capsules (600 mg) by mouth 3 times daily  Global Inject Ease Lancets 30G MISC, USE 1 EACH AS DIRECTED 3 (THREE) TIMES A DAY. DISPENSE BRAND PER PATIENT'S INSURANCE AT PHARMACY DISCRETION.(E11.9)  guaiFENesin (ROBITUSSIN) 100 MG/5ML liquid, Take 10 mLs (200 mg) by mouth every 4 hours as needed for cough  hydrochlorothiazide (MICROZIDE) 12.5 MG capsule, TAKE 1 CAPSULE (12.5 MG TOTAL) BY MOUTH DAILY FOR BLOOD PRESSURE  hydrocortisone (CORTAID) 1 % external cream, Apply topically 2 times daily  ibuprofen (ADVIL/MOTRIN) 600 MG tablet, Take 1 tablet (600 mg) by mouth every 8 hours as needed (pain)  insulin aspart (NOVOLOG PEN) 100 UNIT/ML pen, Inject 10 Units Subcutaneous 2 times daily (with meals)  insulin glargine (LANTUS PEN) 100 UNIT/ML pen, Inject 40 Units Subcutaneous every morning  insulin pen needle (32G X 4 MM) 32G X 4 MM miscellaneous, Use one pen needles daily or as directed.  ipratropium - albuterol 0.5 mg/2.5 mg/3 mL (DUONEB) 0.5-2.5 (3) MG/3ML neb solution, Take 1 vial (3 mLs) by nebulization every 6 hours as needed for shortness of breath / dyspnea or wheezing  lidocaine (XYLOCAINE) 5 % external ointment, Apply topically 3 times daily as needed Small amount (finger tip amount) to chest tid prn pain  meclizine (ANTIVERT) 12.5 MG tablet, Take 2 tablets (25 mg) by mouth 3 times daily as needed for dizziness  metFORMIN (GLUCOPHAGE) 1000 MG tablet, Take 1 tablet (1,000 mg) by mouth 2 times daily (with meals)  metoprolol  tartrate (LOPRESSOR) 25 MG tablet, TAKE 1 TABLET (25 MG TOTAL) BY MOUTH 2 (TWO) TIMES A DAY FOR BLOOD PRESSURE  montelukast (SINGULAIR) 10 MG tablet, Take 1 tablet (10 mg) by mouth At Bedtime  nystatin (MYCOSTATIN) 752536 UNIT/ML suspension, Take 5 mLs (500,000 Units) by mouth 4 times daily  omeprazole (PRILOSEC) 40 MG DR capsule, TAKE 1 CAPSULE (40 MG) BY MOUTH DAILY  Polyethylene Glycol 3350 (PEG 3350) 17 GM/SCOOP POWD, MIX 17 GRAMS WITH LIQUID AND DRINK ONCE DAILY FOR CONSTIPATION  polyvinyl alcohol (ARTIFICIAL TEARS) 1.4 % ophthalmic solution, Place 1 drop into both eyes as needed for dry eyes  predniSONE (DELTASONE) 20 MG tablet, Take 2 tabs daily x 7 days  sucralfate (CARAFATE) 1 GM tablet, TAKE 1 TABLET (1 G TOTAL) BY MOUTH 4 (FOUR) TIMES A DAY BEFORE MEALS AND AT BEDTIME. TAKE 1 HOUR PRIOR TO MEALS Strength: 1 g  sucralfate (CARAFATE) 1 GM/10ML suspension, Take 10 mLs (1 g) by mouth 4 times daily  tiotropium (SPIRIVA RESPIMAT) 2.5 MCG/ACT inhaler, Inhale 2 puffs into the lungs daily  [] amoxicillin-clavulanate (AUGMENTIN) 875-125 MG tablet, Take 1 tablet by mouth 2 times daily for 10 days  [] chlorhexidine (PERIDEX) 0.12 % solution, Swish and spit 15 mLs in mouth 2 times daily for 10 days    No current facility-administered medications on file prior to visit.       PHYSICAL EXAMINATION  VITALS: /74 (Patient Position: Standing)   Pulse 82   Wt 56.1 kg (123 lb 9.6 oz)   BMI 24.14 kg/m    GENERAL: Healthy appearing, alert, no acute distress, normal habitus.  CARDIOVASCULAR: Extremities warm and well perfused. Pulses present.   NEUROLOGICAL:  Patient is awake and oriented to self, place and time.  Attention span is normal.  Memory is grossly intact.  Language is fluent and follows commands appropriately.  Appropriate fund of knowledge. Cranial nerves 2-12 are intact. There is no pronator drift.  Motor exam shows 35 strength in all extremities.  Generalized weakness due to poor effort.   Tone is symmetric bilaterally in upper and lower extremities.  Reflexes are symmetric and 1+ in upper extremities and lower extremities. Sensory exam is grossly intact to light touch, pin prick and vibration.  Finger to nose and heel to shin is without dysmetria.  Romberg is negative.  Gait is normal and the patient is able to do tandem walk and walk on toes and heels with mild difficulty.  Orthostatic vitals      DIAGNOSTICS  MRI 2022  IMPRESSION:  1.  No acute intracranial abnormalities identified.  2.  Minor chronic small vessel ischemic change, otherwise unremarkable contrast-enhanced MRI of the brain.    MRA                                                               IMPRESSION:  1.  Normal MRA [place].    MRA neck  IMPRESSION:  1.  Normal neck MRA.    Cardiac event monitor    ECHo  Left ventricular size, wall motion and function are normal. The ejection  fraction is 55-60%.  There is borderline anterior, septal, and apical wall hypokinesis.  Normal right ventricle size and systolic function.  No hemodynamically significant valvular abnormalities on 2D or color flow  Imaging.    CTA 2021  HEAD CT:  1.  No evidence of acute hemorrhage, mass effect, or acute vascular territorial infarction.  2.  Severe left sphenoid sinus disease.     HEAD CTA:   1.  No evidence of proximal large vessel occlusion or flow-limiting stenosis.  2.  Dorsally directed 2 mm contour irregularity arising from the distal left supraclinoid ICA may represent a tiny posterior communicating artery aneurysm.     NECK CTA:  1.  No hemodynamically significant stenosis based on NASCET criteria.  2.  Mild left V1 segment stenosis.  3.  No evidence for dissection.      MRI 2020  IMPRESSION:  MRI BRAIN:  1. No finding for acute infarct, hemorrhage, or mass.  2. There are a few very small foci of FLAIR hyperintensity within the cerebral white matter, nonspecific but compatible with small areas of gliosis and/or chronic myelin loss.     MRA  [place]:  1. Congenital variation of the [place] without vascular cutoff, aneurysm, or flow-limiting stenosis.    MRI 2021  HEAD MRI:   1.  Tiny linear focus of diffusion restriction within the right dorsal diomedes suspicious for acute small vessel infarct. This is new compared to 12/07/2020.  2.  No hemorrhagic transformation or mass effect. No additional infarct identified.  3.  Mild presumed microvascular ischemic change within the cerebral white matter.     HEAD MRA:   1.  No aneurysm, high flow AVM or significant stenosis identified.  2.  Variant [place] anatomy as above.     NECK MRA:  1.  Evaluation degraded by suboptimal timing of the contrast bolus and significant venous contamination the gadolinium bolus MRA.  2.  No hemodynamically significant stenosis in the carotid circulation by NASCET criteria.  3.  Poor visualization of the left vertebral artery origin and proximal left V1 segment. This may be artifactual, but it is difficult to exclude high-grade stenosis in this region.    MRI 2021  IMPRESSION:  1.  No mass, hemorrhage, or recent infarct identified.  2.  Diffusion abnormality involving the right ventral diomedes seen on 01/13/2021 is no longer identified. No definite residual signal abnormality in this location to confirm prior infarct. This may relate to small size and slice selection.  3.  Inflammatory changes of the paranasal sinuses including bilateral maxillary air-fluid levels. Correlate with clinical evidence for sinusitis.      RELEVANT LABS  Component      Latest Ref Rng & Units 11/30/2022 2/5/2023   WBC      4.0 - 11.0 10e3/uL  9.3   RBC Count      3.80 - 5.20 10e6/uL  4.59   Hemoglobin      11.7 - 15.7 g/dL  11.8   Hematocrit      35.0 - 47.0 %  38.1   MCV      78 - 100 fL  83   MCH      26.5 - 33.0 pg  25.7 (L)   MCHC      31.5 - 36.5 g/dL  31.0 (L)   RDW      10.0 - 15.0 %  15.1 (H)   Platelet Count      150 - 450 10e3/uL  228   % Neutrophils      %  69   %  Lymphocytes      %  14   % Monocytes      %  6   % Eosinophils      %  11   % Basophils      %  0   % Immature Granulocytes      %  0   NRBCs per 100 WBC      <1 /100  0   Absolute Neutrophils      1.6 - 8.3 10e3/uL  6.4   Absolute Lymphocytes      0.8 - 5.3 10e3/uL  1.3   Absolute Monocytes      0.0 - 1.3 10e3/uL  0.6   Absolute Eosinophils      0.0 - 0.7 10e3/uL  1.0 (H)   Absolute Basophils      0.0 - 0.2 10e3/uL  0.0   Absolute Immature Granulocytes      <=0.4 10e3/uL  0.0   Absolute NRBCs      10e3/uL  0.0   Sodium      136 - 145 mmol/L  138   Potassium      3.4 - 5.3 mmol/L  4.2   Chloride      98 - 107 mmol/L  102   Carbon Dioxide (CO2)      22 - 29 mmol/L  25   Anion Gap      7 - 15 mmol/L  11   Urea Nitrogen      6.0 - 20.0 mg/dL  10.6   Creatinine      0.51 - 0.95 mg/dL  0.58   Calcium      8.6 - 10.0 mg/dL  9.5   Glucose      70 - 99 mg/dL  156 (H)   GFR Estimate      >60 mL/min/1.73m2  >90   Hemoglobin A1C      0.0 - 5.6 % 8.6 (H)      OUTSIDE RECORDS  Outside referral notes and chart notes were reviewed and pertinent information has been summarized (in addition to the HPI):- Dr. Multani notes reviewed      ER notes above reviewed      IMPRESSION/REPORT/PLAN  Cerebrovascular accident of right pontine structure (H)  Medication overuse headache  Vertigo  Light headed  Intractable chronic migraine without aura and without status migrainosus    This is a 53 year old female history of right pontine stroke thought to be due to vascular risk factors.  Currently she is on aspirin for stroke prevention.  Further management of vascular risk factors per primary care.    She does complain of vertigo and most likely this is related to her stroke.  Meclizine is currently helping and should continue.  She is tried vestibular rehab without any benefit.  Could be related to headaches also.  There might not be any further treatment options available for this.    In terms of her headaches these are most suggestive of  medication overuse headaches since she is overusing Tylenol.  Courage her not to use the Tylenol more than 2-3 times per week.  Currently she is on amitriptyline and for further increase the dose to see if it helps.  Headaches do have a migrainous feature and could consider other migraine medications depending on how she does.    She does complain of some lightheadedness and orthostatic vitals were negative.  Most likely this is unrelated to the stroke.  Further management per primary care.  Encourage good hydration.    I can see her back in 3 months.    -     amitriptyline (ELAVIL) 10 MG tablet; TAKE 3 TABLETS (30 MG TOTAL) BY MOUTH AT BEDTIME.    Return in about 3 months (around 5/15/2023) for In-Clinic Visit (must), After testing.    Over 60 minutes were spent coordinating the care for the patient on the day of the encounter.  This includes previsit, during visit and post visit activities as documented above.  Counseling patient.  Reviewing previous neurology notes and previous imaging studies.  Use of  requires extra time.  Multiple problems reviewed/addressed.  Prescription management.  (Activities include but not inclusive of reviewing chart, reviewing outside records, reviewing labs and imaging study results as well as the images, patient visit time including getting history and exam,  use if applicable, review of test results with the patient and coming up with a plan in a shared model, counseling patient and family, education and answering patient questions, EMR , EMR diagnosis entry and problem list management, medication reconciliation and prescription management if applicable, paperwork if applicable, printing documents and documentation of the visit activities.)        Guerrero Prescott MD  Neurologist  [facility]  Tel:- [phone]    This note was dictated using voice recognition software.  Any grammatical or context distortions are  unintentional and inherent to the software.        Again, thank you for allowing me to participate in the care of your patient.        Sincerely,        Guerrero Prescott MD

## 2023-02-15 NOTE — PROGRESS NOTES
NEUROLOGY OUTPATIENT CONSULT NOTE  Feb 15, 2023     CHIEF COMPLAINT/REASON FOR VISIT/REASON FOR CONSULT  Patient presents with:  Neurologic Problem: Transfer pt of Dr. Multani   Ongoing headache/dizziness     REASON FOR CONSULTATION- Multiple issues    REFERRAL SOURCE   Referred Self  CC  Referred Self    HISTORY OF PRESENT ILLNESS  Kulwant Amin is a 53 year old female seen today for evaluation of multiple issues.    In 2021 she was found to have a right pontine stroke.  Presenting symptoms of vertigo.  No clear etiology for the stroke was identified it was thought to be lacunar.  She does have a history of hypertension, hyperlipidemia, diabetes.  She was supposed to be on Plavix though currently is only on aspirin.  No recurrence of stroke symptoms.  She is presented to the ER since then with vertigo symptoms and her imaging studies have been negative    1.  She reports that the vertigo is there all the time.  She is using meclizine which she finds some benefit with. She has done vestibular rehab without any improvement.    2.  Further complains of headaches.  These are around-the-clock.  He is taking Tylenol every 8 hours to get rid of the headaches.  Headaches are more frontal.  They can be throbbing in nature with photophobia and phonophobia.  She does continue visual auras as well.  She is on amitriptyline at nighttime.  She feels that it might be helping with the headaches.  Does not get good sleep due to COPD.  Headaches started after her stroke.    3.  No other new strokelike symptoms.    4.  Does complain of some lightheadedness especially during the exam today.  Has been put on hydrochlorothiazide by her primary care provider.    Previous history is reviewed and this is unchanged.    PAST MEDICAL/SURGICAL HISTORY  Past Medical History:   Diagnosis Date     Acute asthma exacerbation 01/06/2020     Anxiety      Arthritis      Asthma exacerbation 11/19/2015     Chronic obstructive pulmonary disease,  unspecified COPD type (H)      COPD (chronic obstructive pulmonary disease) (H)      Coronary artery disease due to lipid rich plaque      Depression      Epigastric pain 12/15/2021     Essential hypertension      GERD (gastroesophageal reflux disease)      Infection due to  novel coronavirus 2022    positive with COVID-19 on January 3, 2022     Latent tuberculosis 2019     Microcytic anemia 2019     S/P coronary artery stent placement 2018     TB lung, latent     9 mos INH     Patient Active Problem List   Diagnosis     Pulmonary nodule, right     Environmental allergies     TB lung, latent     COPD (chronic obstructive pulmonary disease)/Asthma      Essential hypertension     Hyperthyroidism     Cataracts, bilateral     Corneal opacity     Irregular astigmatism     Anxiety     Chronic nonintractable headache, unspecified headache type     Coronary artery disease due to lipid rich plaque     Dizziness     Hyperlipidemia     Moderate major depression (H)     Moderate persistent asthma     Unspecified visual loss     GERD (gastroesophageal reflux disease)     Dental caries     H/O arterial ischemic stroke     Constipation     Dysphagia     Chronic abdominal pain     Insomnia     FLACO (obstructive sleep apnea)- mild (AHI 14)     Chest pain     Chest pain, unspecified type     Type 2 diabetes mellitus treated with insulin (H)   Significant for arthritis, diabetes, high blood pressure, hyperlipidemia, stroke    FAMILY HISTORY  Family History   Problem Relation Age of Onset     Other - See Comments Mother          of an intestinal problem     Ulcers Father          of gastritis     Breast Cancer No family hx of    Negative for stroke    SOCIAL HISTORY  Social History     Tobacco Use     Smoking status: Never     Smokeless tobacco: Never     Tobacco comments:     No passive exposure   Substance Use Topics     Alcohol use: No     Drug use: No       SYSTEMS REVIEW  Twelve-system ROS was  done and other than the HPI this was negative except for back pain, joint pain, numbness and tingling, weakness/paralysis, difficulty walking, balance/coordination problems, dizziness, headaches, anxiety, cardiac/heart problems, respiratory problems, snoring loudly.    MEDICATIONS  acetaminophen (TYLENOL) 500 MG tablet, Take 2 tablets (1,000 mg) by mouth every 8 hours as needed for mild pain  acetaminophen (TYLENOL) 500 MG tablet, TAKE 1 PILL BY MOUTH EVERY 6 HOURS AS NEEDED FOR PAIN  albuterol (PROAIR HFA/PROVENTIL HFA/VENTOLIN HFA) 108 (90 Base) MCG/ACT inhaler, Inhale 2 puffs into the lungs every 4 hours as needed for shortness of breath / dyspnea  albuterol (PROVENTIL) (2.5 MG/3ML) 0.083% neb solution, Take 1 vial (2.5 mg) by nebulization every 6 hours as needed  ALLERGY RELIEF 10 MG tablet, TAKE 1 TABLET (10 MG TOTAL) BY MOUTH DAILY FOR ALLERGIES  ASPIRIN LOW DOSE 81 MG EC tablet, TAKE 1 TABLET (81 MG TOTAL) BY MOUTH DAILY.  atorvastatin (LIPITOR) 80 MG tablet, TAKE 1 TABLET (80 MG TOTAL) BY MOUTH AT BEDTIME FOR CHOLESTEROL  B-D U/F 31G X 8 MM insulin pen needle, USE ONE PEN NEEDLE DAILY AS NEEDED FOR SSI  calcium carbonate (TUMS) 500 MG chewable tablet, Take 1 chew tab by mouth 2 times daily as needed  Continuous Blood Gluc Sensor (FREESTYLE MONICA 2 SENSOR) MISC, 1 each every 14 days Use 1 sensor every 14 days. Use to read blood sugars per 's instructions.  CONTOUR NEXT TEST test strip, USE 1 EACH AS DIRECTED 3 (THREE) TIMES A DAY.  cyclobenzaprine (FLEXERIL) 5 MG tablet, Take 1-2 tablets (5-10 mg) by mouth 3 times daily as needed for muscle spasms  dextromethorphan (DELSYM) 30 MG/5ML liquid, Take 10 mLs (60 mg) by mouth 2 times daily as needed for cough  diclofenac (VOLTAREN) 1 % topical gel, Apply 4 g topically 4 times daily  dupilumab (DUPIXENT) 300 MG/2ML prefilled pen, Inject 2 mLs (300 mg) Subcutaneous every 14 days  famotidine (PEPCID) 20 MG tablet, TAKE 1 TABLET (20 MG) BY MOUTH 2 TIMES  DAILY FOR STOMACH  FLUoxetine (PROZAC) 10 MG capsule, Take 1 capsule (10 mg) by mouth daily  fluticasone-vilanterol (BREO ELLIPTA) 200-25 MCG/INH inhaler, Inhale 1 puff into the lungs daily  gabapentin (NEURONTIN) 300 MG capsule, Take 2 capsules (600 mg) by mouth 3 times daily  Global Inject Ease Lancets 30G MISC, USE 1 EACH AS DIRECTED 3 (THREE) TIMES A DAY. DISPENSE BRAND PER PATIENT'S INSURANCE AT PHARMACY DISCRETION.(E11.9)  guaiFENesin (ROBITUSSIN) 100 MG/5ML liquid, Take 10 mLs (200 mg) by mouth every 4 hours as needed for cough  hydrochlorothiazide (MICROZIDE) 12.5 MG capsule, TAKE 1 CAPSULE (12.5 MG TOTAL) BY MOUTH DAILY FOR BLOOD PRESSURE  hydrocortisone (CORTAID) 1 % external cream, Apply topically 2 times daily  ibuprofen (ADVIL/MOTRIN) 600 MG tablet, Take 1 tablet (600 mg) by mouth every 8 hours as needed (pain)  insulin aspart (NOVOLOG PEN) 100 UNIT/ML pen, Inject 10 Units Subcutaneous 2 times daily (with meals)  insulin glargine (LANTUS PEN) 100 UNIT/ML pen, Inject 40 Units Subcutaneous every morning  insulin pen needle (32G X 4 MM) 32G X 4 MM miscellaneous, Use one pen needles daily or as directed.  ipratropium - albuterol 0.5 mg/2.5 mg/3 mL (DUONEB) 0.5-2.5 (3) MG/3ML neb solution, Take 1 vial (3 mLs) by nebulization every 6 hours as needed for shortness of breath / dyspnea or wheezing  lidocaine (XYLOCAINE) 5 % external ointment, Apply topically 3 times daily as needed Small amount (finger tip amount) to chest tid prn pain  meclizine (ANTIVERT) 12.5 MG tablet, Take 2 tablets (25 mg) by mouth 3 times daily as needed for dizziness  metFORMIN (GLUCOPHAGE) 1000 MG tablet, Take 1 tablet (1,000 mg) by mouth 2 times daily (with meals)  metoprolol tartrate (LOPRESSOR) 25 MG tablet, TAKE 1 TABLET (25 MG TOTAL) BY MOUTH 2 (TWO) TIMES A DAY FOR BLOOD PRESSURE  montelukast (SINGULAIR) 10 MG tablet, Take 1 tablet (10 mg) by mouth At Bedtime  nystatin (MYCOSTATIN) 850325 UNIT/ML suspension, Take 5 mLs (500,000  Units) by mouth 4 times daily  omeprazole (PRILOSEC) 40 MG DR capsule, TAKE 1 CAPSULE (40 MG) BY MOUTH DAILY  Polyethylene Glycol 3350 (PEG 3350) 17 GM/SCOOP POWD, MIX 17 GRAMS WITH LIQUID AND DRINK ONCE DAILY FOR CONSTIPATION  polyvinyl alcohol (ARTIFICIAL TEARS) 1.4 % ophthalmic solution, Place 1 drop into both eyes as needed for dry eyes  predniSONE (DELTASONE) 20 MG tablet, Take 2 tabs daily x 7 days  sucralfate (CARAFATE) 1 GM tablet, TAKE 1 TABLET (1 G TOTAL) BY MOUTH 4 (FOUR) TIMES A DAY BEFORE MEALS AND AT BEDTIME. TAKE 1 HOUR PRIOR TO MEALS Strength: 1 g  sucralfate (CARAFATE) 1 GM/10ML suspension, Take 10 mLs (1 g) by mouth 4 times daily  tiotropium (SPIRIVA RESPIMAT) 2.5 MCG/ACT inhaler, Inhale 2 puffs into the lungs daily  [] amoxicillin-clavulanate (AUGMENTIN) 875-125 MG tablet, Take 1 tablet by mouth 2 times daily for 10 days  [] chlorhexidine (PERIDEX) 0.12 % solution, Swish and spit 15 mLs in mouth 2 times daily for 10 days    No current facility-administered medications on file prior to visit.       PHYSICAL EXAMINATION  VITALS: /74 (Patient Position: Standing)   Pulse 82   Wt 56.1 kg (123 lb 9.6 oz)   BMI 24.14 kg/m    GENERAL: Healthy appearing, alert, no acute distress, normal habitus.  CARDIOVASCULAR: Extremities warm and well perfused. Pulses present.   NEUROLOGICAL:  Patient is awake and oriented to self, place and time.  Attention span is normal.  Memory is grossly intact.  Language is fluent and follows commands appropriately.  Appropriate fund of knowledge. Cranial nerves 2-12 are intact. There is no pronator drift.  Motor exam shows 35 strength in all extremities.  Generalized weakness due to poor effort.  Tone is symmetric bilaterally in upper and lower extremities.  Reflexes are symmetric and 1+ in upper extremities and lower extremities. Sensory exam is grossly intact to light touch, pin prick and vibration.  Finger to nose and heel to shin is without dysmetria.   Romberg is negative.  Gait is normal and the patient is able to do tandem walk and walk on toes and heels with mild difficulty.  Orthostatic vitals      DIAGNOSTICS  MRI 2022  IMPRESSION:  1.  No acute intracranial abnormalities identified.  2.  Minor chronic small vessel ischemic change, otherwise unremarkable contrast-enhanced MRI of the brain.    MRA                                                               IMPRESSION:  1.  Normal MRA [place].    MRA neck  IMPRESSION:  1.  Normal neck MRA.    Cardiac event monitor    ECHo  Left ventricular size, wall motion and function are normal. The ejection  fraction is 55-60%.  There is borderline anterior, septal, and apical wall hypokinesis.  Normal right ventricle size and systolic function.  No hemodynamically significant valvular abnormalities on 2D or color flow  Imaging.    CTA 2021  HEAD CT:  1.  No evidence of acute hemorrhage, mass effect, or acute vascular territorial infarction.  2.  Severe left sphenoid sinus disease.     HEAD CTA:   1.  No evidence of proximal large vessel occlusion or flow-limiting stenosis.  2.  Dorsally directed 2 mm contour irregularity arising from the distal left supraclinoid ICA may represent a tiny posterior communicating artery aneurysm.     NECK CTA:  1.  No hemodynamically significant stenosis based on NASCET criteria.  2.  Mild left V1 segment stenosis.  3.  No evidence for dissection.      MRI 2020  IMPRESSION:  MRI BRAIN:  1. No finding for acute infarct, hemorrhage, or mass.  2. There are a few very small foci of FLAIR hyperintensity within the cerebral white matter, nonspecific but compatible with small areas of gliosis and/or chronic myelin loss.     MRA [place]:  1. Congenital variation of the [place] without vascular cutoff, aneurysm, or flow-limiting stenosis.    MRI 2021  HEAD MRI:   1.  Tiny linear focus of diffusion restriction within the right dorsal diomedes suspicious for acute small vessel  infarct. This is new compared to 12/07/2020.  2.  No hemorrhagic transformation or mass effect. No additional infarct identified.  3.  Mild presumed microvascular ischemic change within the cerebral white matter.     HEAD MRA:   1.  No aneurysm, high flow AVM or significant stenosis identified.  2.  Variant [place] anatomy as above.     NECK MRA:  1.  Evaluation degraded by suboptimal timing of the contrast bolus and significant venous contamination the gadolinium bolus MRA.  2.  No hemodynamically significant stenosis in the carotid circulation by NASCET criteria.  3.  Poor visualization of the left vertebral artery origin and proximal left V1 segment. This may be artifactual, but it is difficult to exclude high-grade stenosis in this region.    MRI 2021  IMPRESSION:  1.  No mass, hemorrhage, or recent infarct identified.  2.  Diffusion abnormality involving the right ventral diomedes seen on 01/13/2021 is no longer identified. No definite residual signal abnormality in this location to confirm prior infarct. This may relate to small size and slice selection.  3.  Inflammatory changes of the paranasal sinuses including bilateral maxillary air-fluid levels. Correlate with clinical evidence for sinusitis.      RELEVANT LABS  Component      Latest Ref Rng & Units 11/30/2022 2/5/2023   WBC      4.0 - 11.0 10e3/uL  9.3   RBC Count      3.80 - 5.20 10e6/uL  4.59   Hemoglobin      11.7 - 15.7 g/dL  11.8   Hematocrit      35.0 - 47.0 %  38.1   MCV      78 - 100 fL  83   MCH      26.5 - 33.0 pg  25.7 (L)   MCHC      31.5 - 36.5 g/dL  31.0 (L)   RDW      10.0 - 15.0 %  15.1 (H)   Platelet Count      150 - 450 10e3/uL  228   % Neutrophils      %  69   % Lymphocytes      %  14   % Monocytes      %  6   % Eosinophils      %  11   % Basophils      %  0   % Immature Granulocytes      %  0   NRBCs per 100 WBC      <1 /100  0   Absolute Neutrophils      1.6 - 8.3 10e3/uL  6.4   Absolute Lymphocytes      0.8 - 5.3 10e3/uL  1.3    Absolute Monocytes      0.0 - 1.3 10e3/uL  0.6   Absolute Eosinophils      0.0 - 0.7 10e3/uL  1.0 (H)   Absolute Basophils      0.0 - 0.2 10e3/uL  0.0   Absolute Immature Granulocytes      <=0.4 10e3/uL  0.0   Absolute NRBCs      10e3/uL  0.0   Sodium      136 - 145 mmol/L  138   Potassium      3.4 - 5.3 mmol/L  4.2   Chloride      98 - 107 mmol/L  102   Carbon Dioxide (CO2)      22 - 29 mmol/L  25   Anion Gap      7 - 15 mmol/L  11   Urea Nitrogen      6.0 - 20.0 mg/dL  10.6   Creatinine      0.51 - 0.95 mg/dL  0.58   Calcium      8.6 - 10.0 mg/dL  9.5   Glucose      70 - 99 mg/dL  156 (H)   GFR Estimate      >60 mL/min/1.73m2  >90   Hemoglobin A1C      0.0 - 5.6 % 8.6 (H)      OUTSIDE RECORDS  Outside referral notes and chart notes were reviewed and pertinent information has been summarized (in addition to the HPI):- Dr. Multani notes reviewed      ER notes above reviewed      IMPRESSION/REPORT/PLAN  Cerebrovascular accident of right pontine structure (H)  Medication overuse headache  Vertigo  Light headed  Intractable chronic migraine without aura and without status migrainosus    This is a 53 year old female history of right pontine stroke thought to be due to vascular risk factors.  Currently she is on aspirin for stroke prevention.  Further management of vascular risk factors per primary care.    She does complain of vertigo and most likely this is related to her stroke.  Meclizine is currently helping and should continue.  She is tried vestibular rehab without any benefit.  Could be related to headaches also.  There might not be any further treatment options available for this.    In terms of her headaches these are most suggestive of medication overuse headaches since she is overusing Tylenol.  Courage her not to use the Tylenol more than 2-3 times per week.  Currently she is on amitriptyline and for further increase the dose to see if it helps.  Headaches do have a migrainous feature and could consider  other migraine medications depending on how she does.    She does complain of some lightheadedness and orthostatic vitals were negative.  Most likely this is unrelated to the stroke.  Further management per primary care.  Encourage good hydration.    I can see her back in 3 months.    -     amitriptyline (ELAVIL) 10 MG tablet; TAKE 3 TABLETS (30 MG TOTAL) BY MOUTH AT BEDTIME.    Return in about 3 months (around 5/15/2023) for In-Clinic Visit (must), After testing.    Over 60 minutes were spent coordinating the care for the patient on the day of the encounter.  This includes previsit, during visit and post visit activities as documented above.  Counseling patient.  Reviewing previous neurology notes and previous imaging studies.  Use of  requires extra time.  Multiple problems reviewed/addressed.  Prescription management.  (Activities include but not inclusive of reviewing chart, reviewing outside records, reviewing labs and imaging study results as well as the images, patient visit time including getting history and exam,  use if applicable, review of test results with the patient and coming up with a plan in a shared model, counseling patient and family, education and answering patient questions, EMR , EMR diagnosis entry and problem list management, medication reconciliation and prescription management if applicable, paperwork if applicable, printing documents and documentation of the visit activities.)        Guerrero Prescott MD  Neurologist  [facility]  Tel:- [phone]    This note was dictated using voice recognition software.  Any grammatical or context distortions are unintentional and inherent to the software.

## 2023-02-16 ENCOUNTER — OFFICE VISIT (OUTPATIENT)
Dept: CARDIOLOGY | Facility: CLINIC | Age: 53
End: 2023-02-16
Attending: INTERNAL MEDICINE
Payer: COMMERCIAL

## 2023-02-16 VITALS
WEIGHT: 124 LBS | HEART RATE: 99 BPM | SYSTOLIC BLOOD PRESSURE: 111 MMHG | BODY MASS INDEX: 23.41 KG/M2 | RESPIRATION RATE: 16 BRPM | DIASTOLIC BLOOD PRESSURE: 75 MMHG | HEIGHT: 61 IN

## 2023-02-16 DIAGNOSIS — I10 HYPERTENSION, UNSPECIFIED TYPE: Primary | ICD-10-CM

## 2023-02-16 DIAGNOSIS — I25.83 CORONARY ARTERY DISEASE DUE TO LIPID RICH PLAQUE: ICD-10-CM

## 2023-02-16 DIAGNOSIS — I47.10 SVT (SUPRAVENTRICULAR TACHYCARDIA) (H): ICD-10-CM

## 2023-02-16 DIAGNOSIS — I25.10 CORONARY ARTERY DISEASE DUE TO LIPID RICH PLAQUE: ICD-10-CM

## 2023-02-16 DIAGNOSIS — E78.5 DYSLIPIDEMIA: ICD-10-CM

## 2023-02-16 LAB — LDLC SERPL DIRECT ASSAY-MCNC: 45 MG/DL

## 2023-02-16 PROCEDURE — 36415 COLL VENOUS BLD VENIPUNCTURE: CPT | Performed by: INTERNAL MEDICINE

## 2023-02-16 PROCEDURE — 99214 OFFICE O/P EST MOD 30 MIN: CPT | Performed by: INTERNAL MEDICINE

## 2023-02-16 PROCEDURE — 83721 ASSAY OF BLOOD LIPOPROTEIN: CPT | Performed by: INTERNAL MEDICINE

## 2023-02-16 NOTE — LETTER
2/16/2023    Adrien Cardoza MD  [address] [facility] [address]  [place] [address]    RE: Kulwant Amin       Dear Colleague,     I had the pleasure of seeing Kulwant Amin in the [facility].         [facility]   [address], [place] [address]   [email or web address]   OFFICE: [phone]          Thank you Adrien Jensen for asking the [facility] team to participate in the care of your patient, Kulwant Amin.*     Impression and Plan     1. Coronary artery disease. Kulwant has known coronary artery disease. Specifically, patient underwent coronary angiography with PCI with stent placement to proximal-mid left anterior descending coronary artery (2.75×32 and 4.0×8 mm Synergy drug-eluting stents).  ?  Kulwant underwent nuclear perfusion imaging 1 March 2018 at [facility] which revealed no evidence of infarct or ischemia.  ?  Repeat ischemic work-up involving a regadenoson MRI 24 July 2019 return favorable and revealed no evidence of ischemia (see Cardiac Diagnostic section below).     She underwent repeat coronary angiography 5 May 2022 that revealed no significant obstructive coronary disease and widely patent stent in LAD.     On interview today, Kulwant reports no chest pain symptoms reminiscent of her angina though she continues to have atypical chest discomfort.     2. SVT. Patient at time of initial consultation by me 27 December 2017 had been reported to have an SVT with heart rate of approximately 200-220 bpm. Patient apparently responded to adenosine. 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 Differential, however, would include Gustavo-Parkinson-White with concealed accessory pathway (no delta waves seen on ECG) with orthodromic reciprocating tachycardia (ORT).    She has had no subjective or objective recurrence since that time.     4. Hypertension. Blood pressure is  "reasonable in the office today at 111/75 mmHg.  ?  5. Dyslipidemia.  Lipid profile 25 March 2022 was favorable with LDL 54 mg/dL and HDL 52 mg/dL.    We will obtain direct LDL today.     Plan on follow-up in 1 year.      35 minutes spent reviewing prior records (including documentation, laboratory studies, cardiac testing/imaging), interview with patient along with physical exam, planning, and subsequent documentation/crafting of note).           History of Present Illness    Once again I would like to thank you again for asking me to participate in the care of your patient, Kulwant Amin.  As you know, but to reiterate for my own records, Kulwant Amin is a 53 year old female with known coronary artery disease. Specifically, patient underwent coronary angiography 25 January 2018 with PCI with stent placement to proximal-mid left anterior descending coronary artery (2.75×32 and 4.0×8 mm Synergy drug-eluting stents).  ?  Kulwant underwent a nuclear perfusion study 22 January 2019 which was favorable and revealed no evidence of infarct or ischemia with normal ejection fraction of 70%.     She underwent repeat coronary angiography 5 May 2022 that revealed no significant obstructive coronary disease and widely patent stent in LAD.     On interview today, Kulwant continues to report intermittent discomfort which she describes as epigastric pain.  She reports no chest discomfort reminiscent of her prior angina, however.  She has no chest discomfort predictable with exertion.  She denies any subjective palpitations.  Breathing is comfortable    Further review of systems is otherwise negative/noncontributory (medical record and 13 point review of systems reviewed as well and pertinent positives noted).         Cardiac Diagnostics      Echocardiogram 5 May 2022:  1. Normal left ventricular size and systolic performance with ejection fraction of 55 to 60%.  2. \"Borderline\" anterior, septal, and apical hypokinesis.  3. Normal right " ventricular size and systolic performance.  4. Normal atrial dimensions.    Echocardiogram 14 January 2021:  1. Normal left ventricular size and systolic performance with ejection fraction of 55 to 60%.  2. No significant valvular heart disease.  3. Normal right ventricular size and systolic performance.  4. Normal atrial dimensions.  o When compared to prior echocardiogram dated 14 August 2020, no significant change.    Echocardiogram 14 August 2020 (personally reviewed):  1. Normal left ventricular size and systolic performance with a visually estimated ejection fraction of 55-60%.   2. No significant valvular heart disease is identified on this study.   3. Normal right ventricular size and systolic performance.     Echocardiogram 23 May 2019:  1. Normal left ventricular size and systolic performance with ejection fraction of 65 to 70%.  2. No significant valvular heart disease.  3. Normal right ventricular size and systolic performance.  4. Normal atrial dimensions.    Echocardiogram 25 January 2018:  1. Limited echocardiogram.  2. Normal left ventricular size and systolic performance with ejection fraction of 60%.  3. No pericardial effusion.    Echocardiogram 27 December 2017:  1. Normal left ventricular size and systolic performance with ejection fraction 65-70%.  2. No significant valvular heart disease.  3. Normal right ventricular size and systolic performance  4. Normal atrial dimensions.    Echocardiogram 7 July 2014:  1. Normal left ventricular size and systolic performance with ejection fraction of 55-60%.  2. No significant valvular heart disease.  3. Normal right ventricular size and systolic performance.  4. Normal atrial dimensions.    Regadenoson MRI 24 July 2019:  1. Regadenoson stress cardiac MRI is negative for inducible myocardial ischemia.   2. Regadenoson stress ECG is negative for inducible myocardial ischemia.  3. No previous myocardial infarction is identified.  4. Small left ventricular  size, wall thickness and function. The calculated left ventricular ejection fraction is 69%.  5. Normal right ventricular size and function.  6. No obvious valvular disease.    Nuclear perfusion imaging study 22 January 2019:  1. No evidence of infarct or ischemia.  2. Normal left ventricular systolic performance with ejection fraction of 70%.    Nuclear perfusion imaging 1 March 2018:  1. No evidence of infarct or ischemia.  2. Normal left ventricular systolic performance with ejection fraction greater than 75%.    Nuclear perfusion imaging 28 December 2017 (pre-coronary angiogram):  1. No evidence of infarct or ischemia.  2. Increased stress to rest E ratio 1.46 possibly representing multivessel disease.  3. Normal left ventricular systolic performance with ejection fraction of 75%.    Coronary angiogram 5 May 2022:  1. Left main coronary artery: Normal.  2. Left anterior descending coronary artery: No significant obstructive disease with widely patent stent.  3. Circumflex coronary artery: Proximal 30% stenosis.  4. Right coronary artery: Dominant vessel with 25% distal stenosis.    Coronary angiogram 25 January 2018:  1. Left Main coronary artery: Normal.  2. Left anterior descending coronary artery: Proximal-mid 70% stenosis at diagonal bifurcation with fractional flow reserve of 0.80. 25 January 2018 with  3. Circumflex coronary artery: Mild disease.  4. Right coronary artery: Normal.  5. Status post PCI with stent placement to proximal-mid left anterior descending coronary artery (2.75×32 and 4.0×8 mm Synergy drug-eluting stents).    Ambulatory monitor x12 days October 2022:  1. Essentially normal multi day patient activated monitor.  2. Patient's symptomatic recordings correlated with normal sinus rhythm.  3. No sustained atrial or ventricular tachyarrhythmia.  4. No profound bradycardia or significant pauses.    Holter monitor 13 August 2019:  1. Normal Holter monitor.    Holter monitor 22 January  "2019:  2. Normal Holter.    30 day event recorder 20 March 2018 through 17 April 2018:  1. Normal 24-hour Holter monitor.  2. Symptoms correlating with sinus rhythm.    Twelve-lead ECG (personally reviewed) 5 February 2023: Sinus rhythm.  Incomplete right branch block.          Physical Examination       /75 (BP Location: Left arm, Patient Position: Sitting, Cuff Size: Adult Regular)   Pulse 99   Resp 16   Ht 1.549 m (5' 1\")   Wt 56.2 kg (124 lb)   BMI 23.43 kg/m          Wt Readings from Last 3 Encounters:   02/16/23 56.2 kg (124 lb)   02/15/23 56.1 kg (123 lb 9.6 oz)   02/05/23 55.8 kg (123 lb)       The patient is alert and oriented times three. Sclerae are anicteric. Mucosal membranes are moist. Jugular venous pressure is normal. No significant adenopathy/thyromegally appreciated. Lungs are clear with good expansion. On cardiovascular exam, the patient has a regular S1 and S2. Abdomen is soft and non-tender. Extremities reveal no clubbing, cyanosis, or edema.         Medications  Allergies   Current Outpatient Medications   Medication Sig Dispense Refill     acetaminophen (TYLENOL) 500 MG tablet Take 2 tablets (1,000 mg) by mouth every 8 hours as needed for mild pain 30 tablet 0     acetaminophen (TYLENOL) 500 MG tablet TAKE 1 PILL BY MOUTH EVERY 6 HOURS AS NEEDED FOR PAIN 100 tablet 10     albuterol (PROAIR HFA/PROVENTIL HFA/VENTOLIN HFA) 108 (90 Base) MCG/ACT inhaler Inhale 2 puffs into the lungs every 4 hours as needed for shortness of breath / dyspnea 4 g 11     albuterol (PROVENTIL) (2.5 MG/3ML) 0.083% neb solution Take 1 vial (2.5 mg) by nebulization every 6 hours as needed 90 mL 11     amitriptyline (ELAVIL) 10 MG tablet TAKE 3 TABLETS (30 MG TOTAL) BY MOUTH AT BEDTIME. 270 tablet 0     ASPIRIN LOW DOSE 81 MG EC tablet TAKE 1 TABLET (81 MG TOTAL) BY MOUTH DAILY. 90 tablet 3     atorvastatin (LIPITOR) 80 MG tablet TAKE 1 TABLET (80 MG TOTAL) BY MOUTH AT BEDTIME FOR CHOLESTEROL 90 tablet 3     " B-D U/F 31G X 8 MM insulin pen needle USE ONE PEN NEEDLE DAILY AS NEEDED FOR  each 1     calcium carbonate (TUMS) 500 MG chewable tablet Take 1 chew tab by mouth 2 times daily as needed       Continuous Blood Gluc Sensor (FREESTYLE MONICA 2 SENSOR) MISC 1 each every 14 days Use 1 sensor every 14 days. Use to read blood sugars per 's instructions. 2 each 11     CONTOUR NEXT TEST test strip USE 1 EACH AS DIRECTED 3 (THREE) TIMES A DAY. 50 strip 11     cyclobenzaprine (FLEXERIL) 5 MG tablet Take 1-2 tablets (5-10 mg) by mouth 3 times daily as needed for muscle spasms 30 tablet 3     dextromethorphan (DELSYM) 30 MG/5ML liquid Take 10 mLs (60 mg) by mouth 2 times daily as needed for cough 178 mL 0     diclofenac (VOLTAREN) 1 % topical gel Apply 4 g topically 4 times daily 350 g 3     dupilumab (DUPIXENT) 300 MG/2ML prefilled pen Inject 2 mLs (300 mg) Subcutaneous every 14 days 4 mL 3     famotidine (PEPCID) 20 MG tablet TAKE 1 TABLET (20 MG) BY MOUTH 2 TIMES DAILY FOR STOMACH 180 tablet 3     FLUoxetine (PROZAC) 10 MG capsule Take 1 capsule (10 mg) by mouth daily 90 capsule 1     fluticasone-vilanterol (BREO ELLIPTA) 200-25 MCG/INH inhaler Inhale 1 puff into the lungs daily 60 each 11     gabapentin (NEURONTIN) 300 MG capsule Take 2 capsules (600 mg) by mouth 3 times daily 540 capsule 3     Global Inject Ease Lancets 30G MISC USE 1 EACH AS DIRECTED 3 (THREE) TIMES A DAY. DISPENSE BRAND PER PATIENT'S INSURANCE AT PHARMACY DISCRETION.(E11.9) 100 each 3     guaiFENesin (ROBITUSSIN) 100 MG/5ML liquid Take 10 mLs (200 mg) by mouth every 4 hours as needed for cough 200 mL 1     hydrochlorothiazide (MICROZIDE) 12.5 MG capsule TAKE 1 CAPSULE (12.5 MG TOTAL) BY MOUTH DAILY FOR BLOOD PRESSURE 90 capsule 3     hydrocortisone (CORTAID) 1 % external cream Apply topically 2 times daily 60 g 0     ibuprofen (ADVIL/MOTRIN) 600 MG tablet Take 1 tablet (600 mg) by mouth every 8 hours as needed (pain) 30 tablet 0      insulin aspart (NOVOLOG PEN) 100 UNIT/ML pen Inject 10 Units Subcutaneous 2 times daily (with meals) 15 mL 3     insulin glargine (LANTUS PEN) 100 UNIT/ML pen Inject 40 Units Subcutaneous every morning 45 mL 3     insulin pen needle (32G X 4 MM) 32G X 4 MM miscellaneous Use one pen needles daily or as directed. 200 each 11     ipratropium - albuterol 0.5 mg/2.5 mg/3 mL (DUONEB) 0.5-2.5 (3) MG/3ML neb solution Take 1 vial (3 mLs) by nebulization every 6 hours as needed for shortness of breath / dyspnea or wheezing 360 mL 11     lidocaine (XYLOCAINE) 5 % external ointment Apply topically 3 times daily as needed Small amount (finger tip amount) to chest tid prn pain 35.44 g 0     meclizine (ANTIVERT) 12.5 MG tablet Take 2 tablets (25 mg) by mouth 3 times daily as needed for dizziness 30 tablet 0     metFORMIN (GLUCOPHAGE) 1000 MG tablet Take 1 tablet (1,000 mg) by mouth 2 times daily (with meals) 180 tablet 1     metoprolol tartrate (LOPRESSOR) 25 MG tablet TAKE 1 TABLET (25 MG TOTAL) BY MOUTH 2 (TWO) TIMES A DAY FOR BLOOD PRESSURE 180 tablet 3     montelukast (SINGULAIR) 10 MG tablet Take 1 tablet (10 mg) by mouth At Bedtime 30 tablet 11     nystatin (MYCOSTATIN) 184336 UNIT/ML suspension Take 5 mLs (500,000 Units) by mouth 4 times daily 473 mL 1     omeprazole (PRILOSEC) 40 MG DR capsule TAKE 1 CAPSULE (40 MG) BY MOUTH DAILY 90 capsule 3     Polyethylene Glycol 3350 (PEG 3350) 17 GM/SCOOP POWD MIX 17 GRAMS WITH LIQUID AND DRINK ONCE DAILY FOR CONSTIPATION 510 g 12     predniSONE (DELTASONE) 20 MG tablet Take 2 tabs daily x 7 days 14 tablet 0     sucralfate (CARAFATE) 1 GM/10ML suspension Take 10 mLs (1 g) by mouth 4 times daily 414 mL 0     tiotropium (SPIRIVA RESPIMAT) 2.5 MCG/ACT inhaler Inhale 2 puffs into the lungs daily 4 g 1     ALLERGY RELIEF 10 MG tablet TAKE 1 TABLET (10 MG TOTAL) BY MOUTH DAILY FOR ALLERGIES (Patient not taking: Reported on 2/16/2023) 30 tablet 2     polyvinyl alcohol (ARTIFICIAL TEARS) 1.4  % ophthalmic solution Place 1 drop into both eyes as needed for dry eyes (Patient not taking: Reported on 2/16/2023) 15 mL 3     sucralfate (CARAFATE) 1 GM tablet TAKE 1 TABLET (1 G TOTAL) BY MOUTH 4 (FOUR) TIMES A DAY BEFORE MEALS AND AT BEDTIME. TAKE 1 HOUR PRIOR TO MEALS Strength: 1 g (Patient not taking: Reported on 2/16/2023) 120 tablet 11     No Known Allergies       Lab Results    Chemistry/lipid CBC Cardiac Enzymes/BNP/TSH/INR   Recent Labs   Lab Test 03/25/22  1200   CHOL 145   HDL 52   LDL 54   TRIG 197*     Recent Labs   Lab Test 03/25/22  1200 11/30/21  1307 05/06/21  1106   LDL 54 48 43     Recent Labs   Lab Test 02/05/23  1352      POTASSIUM 4.2   CHLORIDE 102   CO2 25   *   BUN 10.6   CR 0.58   GFRESTIMATED >90   FREDY 9.5     Recent Labs   Lab Test 02/05/23  1352 11/18/22  0921 10/13/22  0833   CR 0.58 0.53 0.51     Recent Labs   Lab Test 11/30/22  1129 08/25/22  0852 03/25/22  1200   A1C 8.6* 10.2* 10.9*          Recent Labs   Lab Test 02/05/23  1352   WBC 9.3   HGB 11.8   HCT 38.1   MCV 83        Recent Labs   Lab Test 02/05/23  1352 11/18/22  0921 10/13/22  0833   HGB 11.8 11.7 11.6*    Recent Labs   Lab Test 09/06/22  0934 07/11/22  2340 07/11/22 2018   TROPONINI 0.02 <0.01 <0.01     Recent Labs   Lab Test 07/11/22 2018 07/02/22 1929 01/03/22  1628   BNP 15 12 <10     Recent Labs   Lab Test 07/14/22  1138   TSH 0.59     Recent Labs   Lab Test 09/06/22  0934 07/02/22 1929 05/27/21  1107   INR 1.02 1.03 0.98        Medical History  Surgical History Family History Social History   Past Medical History:   Diagnosis Date     Acute asthma exacerbation 01/06/2020     Anxiety      Arthritis      Asthma exacerbation 11/19/2015     Chronic obstructive pulmonary disease, unspecified COPD type (H)      COPD (chronic obstructive pulmonary disease) (H)      Coronary artery disease due to lipid rich plaque      Depression      Epigastric pain 12/15/2021     Essential hypertension      GERD  (gastroesophageal reflux disease)      Infection due to 2019 novel coronavirus 2022    positive with COVID-19 on January 3, 2022     Latent tuberculosis 2019     Microcytic anemia 2019     S/P coronary artery stent placement 2018     TB lung, latent     9 mos INH     Past Surgical History:   Procedure Laterality Date     CORONARY STENT PLACEMENT       CV CORONARY ANGIOGRAM N/A 2018    Procedure: Coronary Angiogram;  Surgeon: Angelo Serrano MD;  Location: [facility] Lab;  Service:      CV CORONARY ANGIOGRAM N/A 2022    Procedure: Coronary Angiogram;  Surgeon: Irwin Cano MD;  Location: [facility] CATH LAB CV     CV CORONARY ANGIOGRAM  2022    Procedure: ;  Surgeon: Irwin Cano MD;  Location: [facility] LAB CV     DE ESOPHAGOGASTRODUODENOSCOPY TRANSORAL DIAGNOSTIC N/A 12/10/2018    Procedure: ESOPHAGOGASTRODUODENOSCOPY (EGD);  Surgeon: Eddie Renteria MD;  Location: [facility];  Service: Gastroenterology     DE ESOPHAGOGASTRODUODENOSCOPY TRANSORAL DIAGNOSTIC N/A 12/3/2020    Procedure: ESOPHAGOGASTRODUODENOSCOPY (EGD) with biospies ;  Surgeon: Avi Crow MD;  Location: [facility];  Service: Gastroenterology     [facility] COLONOSCOPY W/WO BRUSH/WASH N/A 12/10/2018    Procedure: COLONOSCOPY with polypectomy using biopsy forceps;  Surgeon: Eddie Renteria MD;  Location: [facility];  Service: Gastroenterology     Family History   Problem Relation Age of Onset     Other - See Comments Mother          of an intestinal problem     Ulcers Father          of gastritis     Breast Cancer No family hx of         Social History     Socioeconomic History     Marital status:      Spouse name: Not on file     Number of children: Not on file     Years of education: Not on file     Highest education level: Not on file   Occupational History     Not on file   Tobacco Use     Smoking status: Never     Smokeless tobacco: Never     Tobacco  comments:     No passive exposure   Substance and Sexual Activity     Alcohol use: No     Drug use: No     Sexual activity: Yes     Partners: Male   Other Topics Concern     Parent/sibling w/ CABG, MI or angioplasty before 65F 55M? Not Asked   Social History Narrative    12/22/2017 The patient lives with her daughter-in-law (who is present), , son, and 2 grandchildren (total of 6 people). [place].     Social Determinants of Health     Financial Resource Strain: Not on file   Food Insecurity: Not on file   Transportation Needs: Not on file   Physical Activity: Not on file   Stress: Not on file   Social Connections: Not on file   Intimate Partner Violence: Not on file   Housing Stability: Not on file                      Thank you for allowing me to participate in the care of your patient.      Sincerely,   Dle Hirsch MD   [facility]  cc:   Del Hirsch MD  [address] [facility] [address] [facility] [address]  [place] [address]   Estimated Blood Loss (Cc): minimal

## 2023-02-16 NOTE — PROGRESS NOTES
[facility] HEART [facility]   [address], [place] [address]   [email or web address]   OFFICE: [phone]          Thank you Adrien Jensen for asking the [facility] Heart Care team to participate in the care of your patient, Kulwant Amin.*     Impression and Plan     1. Coronary artery disease. Kulwant has known coronary artery disease. Specifically, patient underwent coronary angiography with PCI with stent placement to proximal-mid left anterior descending coronary artery (2.75×32 and 4.0×8 mm Synergy drug-eluting stents).  ?  Kulwant underwent nuclear perfusion imaging 1 March 2018 at [facility] which revealed no evidence of infarct or ischemia.  ?  Repeat ischemic work-up involving a regadenoson MRI 24 July 2019 return favorable and revealed no evidence of ischemia (see Cardiac Diagnostic section below).     She underwent repeat coronary angiography 5 May 2022 that revealed no significant obstructive coronary disease and widely patent stent in LAD.     On interview today, Kulwant reports no chest pain symptoms reminiscent of her angina though she continues to have atypical chest discomfort.     2. SVT. Patient at time of initial consultation by me 27 December 2017 had been reported to have an SVT with heart rate of approximately 200-220 bpm. Patient apparently responded to adenosine. 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 Differential, however, would include Gustavo-Parkinson-White with concealed accessory pathway (no delta waves seen on ECG) with orthodromic reciprocating tachycardia (ORT).    She has had no subjective or objective recurrence since that time.     4. Hypertension. Blood pressure is reasonable in the office today at 111/75 mmHg.  ?  5. Dyslipidemia.  Lipid profile 25 March 2022 was favorable with LDL 54 mg/dL and HDL 52 mg/dL.    We will obtain direct LDL today.     Plan on follow-up in 1  "year.      35 minutes spent reviewing prior records (including documentation, laboratory studies, cardiac testing/imaging), interview with patient along with physical exam, planning, and subsequent documentation/crafting of note).           History of Present Illness    Once again I would like to thank you again for asking me to participate in the care of your patient, Kulwant Amin.  As you know, but to reiterate for my own records, Kulwant Amin is a 53 year old female with known coronary artery disease. Specifically, patient underwent coronary angiography 25 January 2018 with PCI with stent placement to proximal-mid left anterior descending coronary artery (2.75×32 and 4.0×8 mm Synergy drug-eluting stents).  ?  Kulwant underwent a nuclear perfusion study 22 January 2019 which was favorable and revealed no evidence of infarct or ischemia with normal ejection fraction of 70%.     She underwent repeat coronary angiography 5 May 2022 that revealed no significant obstructive coronary disease and widely patent stent in LAD.     On interview today, Kulwant continues to report intermittent discomfort which she describes as epigastric pain.  She reports no chest discomfort reminiscent of her prior angina, however.  She has no chest discomfort predictable with exertion.  She denies any subjective palpitations.  Breathing is comfortable    Further review of systems is otherwise negative/noncontributory (medical record and 13 point review of systems reviewed as well and pertinent positives noted).         Cardiac Diagnostics      Echocardiogram 5 May 2022:  1. Normal left ventricular size and systolic performance with ejection fraction of 55 to 60%.  2. \"Borderline\" anterior, septal, and apical hypokinesis.  3. Normal right ventricular size and systolic performance.  4. Normal atrial dimensions.    Echocardiogram 14 January 2021:  1. Normal left ventricular size and systolic performance with ejection fraction of 55 to 60%.  2. No " significant valvular heart disease.  3. Normal right ventricular size and systolic performance.  4. Normal atrial dimensions.  o When compared to prior echocardiogram dated 14 August 2020, no significant change.    Echocardiogram 14 August 2020 (personally reviewed):  1. Normal left ventricular size and systolic performance with a visually estimated ejection fraction of 55-60%.   2. No significant valvular heart disease is identified on this study.   3. Normal right ventricular size and systolic performance.     Echocardiogram 23 May 2019:  1. Normal left ventricular size and systolic performance with ejection fraction of 65 to 70%.  2. No significant valvular heart disease.  3. Normal right ventricular size and systolic performance.  4. Normal atrial dimensions.    Echocardiogram 25 January 2018:  1. Limited echocardiogram.  2. Normal left ventricular size and systolic performance with ejection fraction of 60%.  3. No pericardial effusion.    Echocardiogram 27 December 2017:  1. Normal left ventricular size and systolic performance with ejection fraction 65-70%.  2. No significant valvular heart disease.  3. Normal right ventricular size and systolic performance  4. Normal atrial dimensions.    Echocardiogram 7 July 2014:  1. Normal left ventricular size and systolic performance with ejection fraction of 55-60%.  2. No significant valvular heart disease.  3. Normal right ventricular size and systolic performance.  4. Normal atrial dimensions.    Regadenoson MRI 24 July 2019:  1. Regadenoson stress cardiac MRI is negative for inducible myocardial ischemia.   2. Regadenoson stress ECG is negative for inducible myocardial ischemia.  3. No previous myocardial infarction is identified.  4. Small left ventricular size, wall thickness and function. The calculated left ventricular ejection fraction is 69%.  5. Normal right ventricular size and function.  6. No obvious valvular disease.    Nuclear perfusion imaging study 22  January 2019:  1. No evidence of infarct or ischemia.  2. Normal left ventricular systolic performance with ejection fraction of 70%.    Nuclear perfusion imaging 1 March 2018:  1. No evidence of infarct or ischemia.  2. Normal left ventricular systolic performance with ejection fraction greater than 75%.    Nuclear perfusion imaging 28 December 2017 (pre-coronary angiogram):  1. No evidence of infarct or ischemia.  2. Increased stress to rest E ratio 1.46 possibly representing multivessel disease.  3. Normal left ventricular systolic performance with ejection fraction of 75%.    Coronary angiogram 5 May 2022:  1. Left main coronary artery: Normal.  2. Left anterior descending coronary artery: No significant obstructive disease with widely patent stent.  3. Circumflex coronary artery: Proximal 30% stenosis.  4. Right coronary artery: Dominant vessel with 25% distal stenosis.    Coronary angiogram 25 January 2018:  1. Left Main coronary artery: Normal.  2. Left anterior descending coronary artery: Proximal-mid 70% stenosis at diagonal bifurcation with fractional flow reserve of 0.80. 25 January 2018 with  3. Circumflex coronary artery: Mild disease.  4. Right coronary artery: Normal.  5. Status post PCI with stent placement to proximal-mid left anterior descending coronary artery (2.75×32 and 4.0×8 mm Synergy drug-eluting stents).    Ambulatory monitor x12 days October 2022:  1. Essentially normal multi day patient activated monitor.  2. Patient's symptomatic recordings correlated with normal sinus rhythm.  3. No sustained atrial or ventricular tachyarrhythmia.  4. No profound bradycardia or significant pauses.    Holter monitor 13 August 2019:  1. Normal Holter monitor.    Holter monitor 22 January 2019:  2. Normal Holter.    30 day event recorder 20 March 2018 through 17 April 2018:  1. Normal 24-hour Holter monitor.  2. Symptoms correlating with sinus rhythm.    Twelve-lead ECG (personally reviewed) 5 February  "2023: Sinus rhythm.  Incomplete right branch block.          Physical Examination       /75 (BP Location: Left arm, Patient Position: Sitting, Cuff Size: Adult Regular)   Pulse 99   Resp 16   Ht 1.549 m (5' 1\")   Wt 56.2 kg (124 lb)   BMI 23.43 kg/m          Wt Readings from Last 3 Encounters:   02/16/23 56.2 kg (124 lb)   02/15/23 56.1 kg (123 lb 9.6 oz)   02/05/23 55.8 kg (123 lb)       The patient is alert and oriented times three. Sclerae are anicteric. Mucosal membranes are moist. Jugular venous pressure is normal. No significant adenopathy/thyromegally appreciated. Lungs are clear with good expansion. On cardiovascular exam, the patient has a regular S1 and S2. Abdomen is soft and non-tender. Extremities reveal no clubbing, cyanosis, or edema.         Medications  Allergies   Current Outpatient Medications   Medication Sig Dispense Refill     acetaminophen (TYLENOL) 500 MG tablet Take 2 tablets (1,000 mg) by mouth every 8 hours as needed for mild pain 30 tablet 0     acetaminophen (TYLENOL) 500 MG tablet TAKE 1 PILL BY MOUTH EVERY 6 HOURS AS NEEDED FOR PAIN 100 tablet 10     albuterol (PROAIR HFA/PROVENTIL HFA/VENTOLIN HFA) 108 (90 Base) MCG/ACT inhaler Inhale 2 puffs into the lungs every 4 hours as needed for shortness of breath / dyspnea 4 g 11     albuterol (PROVENTIL) (2.5 MG/3ML) 0.083% neb solution Take 1 vial (2.5 mg) by nebulization every 6 hours as needed 90 mL 11     amitriptyline (ELAVIL) 10 MG tablet TAKE 3 TABLETS (30 MG TOTAL) BY MOUTH AT BEDTIME. 270 tablet 0     ASPIRIN LOW DOSE 81 MG EC tablet TAKE 1 TABLET (81 MG TOTAL) BY MOUTH DAILY. 90 tablet 3     atorvastatin (LIPITOR) 80 MG tablet TAKE 1 TABLET (80 MG TOTAL) BY MOUTH AT BEDTIME FOR CHOLESTEROL 90 tablet 3     B-D U/F 31G X 8 MM insulin pen needle USE ONE PEN NEEDLE DAILY AS NEEDED FOR  each 1     calcium carbonate (TUMS) 500 MG chewable tablet Take 1 chew tab by mouth 2 times daily as needed       Continuous Blood Gluc " Sensor (FREESTYLE MONICA 2 SENSOR) [facility] 1 each every 14 days Use 1 sensor every 14 days. Use to read blood sugars per 's instructions. 2 each 11     CONTOUR NEXT TEST test strip USE 1 EACH AS DIRECTED 3 (THREE) TIMES A DAY. 50 strip 11     cyclobenzaprine (FLEXERIL) 5 MG tablet Take 1-2 tablets (5-10 mg) by mouth 3 times daily as needed for muscle spasms 30 tablet 3     dextromethorphan (DELSYM) 30 MG/5ML liquid Take 10 mLs (60 mg) by mouth 2 times daily as needed for cough 178 mL 0     diclofenac (VOLTAREN) 1 % topical gel Apply 4 g topically 4 times daily 350 g 3     dupilumab (DUPIXENT) 300 MG/2ML prefilled pen Inject 2 mLs (300 mg) Subcutaneous every 14 days 4 mL 3     famotidine (PEPCID) 20 MG tablet TAKE 1 TABLET (20 MG) BY MOUTH 2 TIMES DAILY FOR STOMACH 180 tablet 3     FLUoxetine (PROZAC) 10 MG capsule Take 1 capsule (10 mg) by mouth daily 90 capsule 1     fluticasone-vilanterol (BREO ELLIPTA) 200-25 MCG/INH inhaler Inhale 1 puff into the lungs daily 60 each 11     gabapentin (NEURONTIN) 300 MG capsule Take 2 capsules (600 mg) by mouth 3 times daily 540 capsule 3     Global Inject Ease Lancets 30G MISC USE 1 EACH AS DIRECTED 3 (THREE) TIMES A DAY. DISPENSE BRAND PER PATIENT'S INSURANCE AT PHARMACY DISCRETION.(E11.9) 100 each 3     guaiFENesin (ROBITUSSIN) 100 MG/5ML liquid Take 10 mLs (200 mg) by mouth every 4 hours as needed for cough 200 mL 1     hydrochlorothiazide (MICROZIDE) 12.5 MG capsule TAKE 1 CAPSULE (12.5 MG TOTAL) BY MOUTH DAILY FOR BLOOD PRESSURE 90 capsule 3     hydrocortisone (CORTAID) 1 % external cream Apply topically 2 times daily 60 g 0     ibuprofen (ADVIL/MOTRIN) 600 MG tablet Take 1 tablet (600 mg) by mouth every 8 hours as needed (pain) 30 tablet 0     insulin aspart (NOVOLOG PEN) 100 UNIT/ML pen Inject 10 Units Subcutaneous 2 times daily (with meals) 15 mL 3     insulin glargine (LANTUS PEN) 100 UNIT/ML pen Inject 40 Units Subcutaneous every morning 45 mL 3     insulin pen  needle (32G X 4 MM) 32G X 4 MM miscellaneous Use one pen needles daily or as directed. 200 each 11     ipratropium - albuterol 0.5 mg/2.5 mg/3 mL (DUONEB) 0.5-2.5 (3) MG/3ML neb solution Take 1 vial (3 mLs) by nebulization every 6 hours as needed for shortness of breath / dyspnea or wheezing 360 mL 11     lidocaine (XYLOCAINE) 5 % external ointment Apply topically 3 times daily as needed Small amount (finger tip amount) to chest tid prn pain 35.44 g 0     meclizine (ANTIVERT) 12.5 MG tablet Take 2 tablets (25 mg) by mouth 3 times daily as needed for dizziness 30 tablet 0     metFORMIN (GLUCOPHAGE) 1000 MG tablet Take 1 tablet (1,000 mg) by mouth 2 times daily (with meals) 180 tablet 1     metoprolol tartrate (LOPRESSOR) 25 MG tablet TAKE 1 TABLET (25 MG TOTAL) BY MOUTH 2 (TWO) TIMES A DAY FOR BLOOD PRESSURE 180 tablet 3     montelukast (SINGULAIR) 10 MG tablet Take 1 tablet (10 mg) by mouth At Bedtime 30 tablet 11     nystatin (MYCOSTATIN) 666316 UNIT/ML suspension Take 5 mLs (500,000 Units) by mouth 4 times daily 473 mL 1     omeprazole (PRILOSEC) 40 MG DR capsule TAKE 1 CAPSULE (40 MG) BY MOUTH DAILY 90 capsule 3     Polyethylene Glycol 3350 (PEG 3350) 17 GM/SCOOP POWD MIX 17 GRAMS WITH LIQUID AND DRINK ONCE DAILY FOR CONSTIPATION 510 g 12     predniSONE (DELTASONE) 20 MG tablet Take 2 tabs daily x 7 days 14 tablet 0     sucralfate (CARAFATE) 1 GM/10ML suspension Take 10 mLs (1 g) by mouth 4 times daily 414 mL 0     tiotropium (SPIRIVA RESPIMAT) 2.5 MCG/ACT inhaler Inhale 2 puffs into the lungs daily 4 g 1     ALLERGY RELIEF 10 MG tablet TAKE 1 TABLET (10 MG TOTAL) BY MOUTH DAILY FOR ALLERGIES (Patient not taking: Reported on 2/16/2023) 30 tablet 2     polyvinyl alcohol (ARTIFICIAL TEARS) 1.4 % ophthalmic solution Place 1 drop into both eyes as needed for dry eyes (Patient not taking: Reported on 2/16/2023) 15 mL 3     sucralfate (CARAFATE) 1 GM tablet TAKE 1 TABLET (1 G TOTAL) BY MOUTH 4 (FOUR) TIMES A DAY BEFORE  MEALS AND AT BEDTIME. TAKE 1 HOUR PRIOR TO MEALS Strength: 1 g (Patient not taking: Reported on 2/16/2023) 120 tablet 11     No Known Allergies       Lab Results    Chemistry/lipid CBC Cardiac Enzymes/BNP/TSH/INR   Recent Labs   Lab Test 03/25/22  1200   CHOL 145   HDL 52   LDL 54   TRIG 197*     Recent Labs   Lab Test 03/25/22  1200 11/30/21  1307 05/06/21  1106   LDL 54 48 43     Recent Labs   Lab Test 02/05/23  1352      POTASSIUM 4.2   CHLORIDE 102   CO2 25   *   BUN 10.6   CR 0.58   GFRESTIMATED >90   FREDY 9.5     Recent Labs   Lab Test 02/05/23  1352 11/18/22  0921 10/13/22  0833   CR 0.58 0.53 0.51     Recent Labs   Lab Test 11/30/22  1129 08/25/22  0852 03/25/22  1200   A1C 8.6* 10.2* 10.9*          Recent Labs   Lab Test 02/05/23  1352   WBC 9.3   HGB 11.8   HCT 38.1   MCV 83        Recent Labs   Lab Test 02/05/23  1352 11/18/22  0921 10/13/22  0833   HGB 11.8 11.7 11.6*    Recent Labs   Lab Test 09/06/22  0934 07/11/22  2340 07/11/22  2018   TROPONINI 0.02 <0.01 <0.01     Recent Labs   Lab Test 07/11/22  2018 07/02/22  1929 01/03/22  1628   BNP 15 12 <10     Recent Labs   Lab Test 07/14/22  1138   TSH 0.59     Recent Labs   Lab Test 09/06/22  0934 07/02/22 1929 05/27/21  1107   INR 1.02 1.03 0.98        Medical History  Surgical History Family History Social History   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     Microcytic anemia 11/17/2019     S/P coronary  artery stent placement 2018     TB lung, latent     9 mos INH     Past Surgical History:   Procedure Laterality Date     CORONARY STENT PLACEMENT       CV CORONARY ANGIOGRAM N/A 2018    Procedure: Coronary Angiogram;  Surgeon: Angelo Serrano MD;  Location: [facility] Cath Lab;  Service:      CV CORONARY ANGIOGRAM N/A 2022    Procedure: Coronary Angiogram;  Surgeon: Irwin Cano MD;  Location: [facility] CATH LAB CV     CV CORONARY ANGIOGRAM  2022    Procedure: ;  Surgeon: Irwin Cano MD;  Location: [facility] CV     NE ESOPHAGOGASTRODUODENOSCOPY TRANSORAL DIAGNOSTIC N/A 12/10/2018    Procedure: ESOPHAGOGASTRODUODENOSCOPY (EGD);  Surgeon: Eddie Renteria MD;  Location: [facility];  Service: Gastroenterology     NE ESOPHAGOGASTRODUODENOSCOPY TRANSORAL DIAGNOSTIC N/A 12/3/2020    Procedure: ESOPHAGOGASTRODUODENOSCOPY (EGD) with biospies ;  Surgeon: Avi Crow MD;  Location: [facility];  Service: Gastroenterology     [facility] COLONOSCOPY W/WO BRUSH/WASH N/A 12/10/2018    Procedure: COLONOSCOPY with polypectomy using biopsy forceps;  Surgeon: Eddie Renteria MD;  Location: [facility];  Service: Gastroenterology     Family History   Problem Relation Age of Onset     Other - See Comments Mother          of an intestinal problem     Ulcers Father          of gastritis     Breast Cancer No family hx of         Social History     Socioeconomic History     Marital status:      Spouse name: Not on file     Number of children: Not on file     Years of education: Not on file     Highest education level: Not on file   Occupational History     Not on file   Tobacco Use     Smoking status: Never     Smokeless tobacco: Never     Tobacco comments:     No passive exposure   Substance and Sexual Activity     Alcohol use: No     Drug use: No     Sexual activity: Yes     Partners: Male   Other Topics Concern     Parent/sibling w/ CABG, MI or angioplasty before 65F  53M? Not Asked   Social History Narrative    12/22/2017 The patient lives with her daughter-in-law (who is present), , son, and 2 grandchildren (total of 6 people). [place].     Social Determinants of Health     Financial Resource Strain: Not on file   Food Insecurity: Not on file   Transportation Needs: Not on file   Physical Activity: Not on file   Stress: Not on file   Social Connections: Not on file   Intimate Partner Violence: Not on file   Housing Stability: Not on file

## 2023-02-17 ENCOUNTER — TELEPHONE (OUTPATIENT)
Dept: ALLERGY | Facility: CLINIC | Age: 53
End: 2023-02-17
Payer: COMMERCIAL

## 2023-02-17 DIAGNOSIS — J45.50 SEVERE PERSISTENT ASTHMA WITHOUT COMPLICATION (H): ICD-10-CM

## 2023-02-17 RX ORDER — DUPILUMAB 300 MG/2ML
INJECTION, SOLUTION SUBCUTANEOUS
Qty: 4 ML | Refills: 1 | Status: SHIPPED | OUTPATIENT
Start: 2023-02-17 | End: 2023-02-23

## 2023-02-17 NOTE — TELEPHONE ENCOUNTER
Prior Authorization Approval    Authorization Effective Date: 2/17/2023  Authorization Expiration Date: 2/17/2024  Medication: Dupixent re-auth   Approved Dose/Quantity:   Reference #: Key: [id number]   Insurance Company: [employer] - Phone [phone] Fax [phone]  Expected CoPay:       CoPay Card Available:      Foundation Assistance Needed:    Which Pharmacy is filling the prescription (Not needed for infusion/clinic administered): [address]  Pharmacy Notified:    Patient Notified:      MICHAEL Marcos, [facility]  Specialty Pharmacy Clinic Liaison     [facility] Special[facility]    [email or web address]     Phone: [phone]  Fax: [phone]

## 2023-02-21 DIAGNOSIS — Z76.0 ENCOUNTER FOR MEDICATION REFILL: ICD-10-CM

## 2023-02-22 RX ORDER — ASPIRIN 81 MG/1
TABLET, COATED ORAL
Qty: 90 TABLET | Refills: 3 | Status: SHIPPED | OUTPATIENT
Start: 2023-02-22 | End: 2024-04-04

## 2023-02-23 ENCOUNTER — VIRTUAL VISIT (OUTPATIENT)
Dept: ALLERGY | Facility: CLINIC | Age: 53
End: 2023-02-23
Payer: COMMERCIAL

## 2023-02-23 DIAGNOSIS — J45.50 SEVERE PERSISTENT ASTHMA WITHOUT COMPLICATION (H): ICD-10-CM

## 2023-02-23 DIAGNOSIS — J44.9 CHRONIC OBSTRUCTIVE PULMONARY DISEASE, UNSPECIFIED COPD TYPE (H): ICD-10-CM

## 2023-02-23 PROCEDURE — 99213 OFFICE O/P EST LOW 20 MIN: CPT | Mod: VID | Performed by: ALLERGY & IMMUNOLOGY

## 2023-02-23 RX ORDER — FLUTICASONE FUROATE AND VILANTEROL 200; 25 UG/1; UG/1
1 POWDER RESPIRATORY (INHALATION) DAILY
Qty: 1 EACH | Refills: 11 | Status: SHIPPED | OUTPATIENT
Start: 2023-02-23 | End: 2023-02-28

## 2023-02-23 RX ORDER — DUPILUMAB 300 MG/2ML
300 INJECTION, SOLUTION SUBCUTANEOUS
Qty: 4 ML | Refills: 5 | Status: SHIPPED | OUTPATIENT
Start: 2023-02-23 | End: 2023-08-24

## 2023-02-23 NOTE — PROGRESS NOTES
Kulwant is a 53 year old who is being evaluated via a billable video visit.      How would you like to obtain your AVS? MyChart  If the video visit is dropped, the invitation should be resent by:   Will anyone else be joining your video visit? No              Subjective   Kulwant is a 53 year old, presenting for the following health issues:  RECHECK (Dupixent, injections are going okay, symptoms come back after 1 week. )      HPI     Chief complaint: Follow-up asthma    History of present illness: This is a pleasant 53-year-old woman last seen in August.  At that time she is not using her Dupixent regularly.  We had her restart the Dupixent 300 mg every 2 weeks.  With this she feels that she had better control of her asthma but still coughs.  In review of the record, she did have a hospitalization for upper respiratory tract infection exacerbated asthma in October.  However at that time she had only done Dupixent for approximately 6-8.  She reports that she still coughs when she is due for medication.  She believes only last week to week and a half before she starts coughing again.  She states she is coughing currently and due for injection soon.  She uses a rescue inhaler occasionally and that helps but she would like a cough medication.  She reports she is coughing some at night but sleeping okay.  She reports some shortness of breath and wheezing.  She also numbness and tingling in hands and feet and last eosinophil count checked in February was 1000.  No eye irritation.  Eosinophil count is stable.  Continues on Breo 1 puff a day and states she is due for refill but it was somewhat unclear from history.  She reports the cold weather seems to trigger her asthma symptoms of coughing and shortness of breath.    Review of Systems         Objective           Vitals:  No vitals were obtained today due to virtual visit.    Physical Exam   GENERAL: Healthy, alert and no distress  EYES: Eyes grossly normal to inspection.  No  discharge or erythema, or obvious scleral/conjunctival abnormalities.  RESP: No audible wheeze, cough, or visible cyanosis.  No visible retractions or increased work of breathing.    SKIN: Visible skin clear. No significant rash, abnormal pigmentation or lesions.  NEURO: Cranial nerves grossly intact.  Mentation and speech appropriate for age.  PSYCH: Mentation appears normal, affect normal/bright, judgement and insight intact, normal speech and appearance well-groomed.    Impression report and plan:  1.  Severe persistent asthma    She is on the top dose of her Dupixent.  Continue this every 2 weeks.  Suspect exacerbation last fall was due to the fact that Dupixent has not had adequate time to take effect.  I did also be more liberal with her albuterol a few days before her next injection given that this will likely with her cough.  Continue Breo and Spiriva as prescribed by the pulmonologist.  I do not think numbness and tingling in hands and feet are secondary to eosinophilic vasculitis.  Check eosinophil count at next visit and check a spirometry.  I like her to follow in 6 months.  If her cough worsens, she should notify.  May need prednisone but she was talking in full sentences without cough throughout her visit today.  I stated I do not prefer to prescribe cough medicine for asthmatic cough.  She will note the albuterol inhaler does not improve cough.            Video-Visit Details    Type of service:  Video Visit   Video Start Time: 1032  Video End Time:10:51 AM    Originating Location (pt. Location): Home    Distant Location (provider location):  Off-site  Platform used for Video Visit: [employer]erado

## 2023-02-23 NOTE — PATIENT INSTRUCTIONS
Dupixent every 2 weeks    Follow in 6 months    Use albuterol 2 puffs every 4 hours a day or two before injection to help with breakthrough    If cough worsens, notify

## 2023-02-23 NOTE — TELEPHONE ENCOUNTER
"Last Written Prescription Date:  3/7/2022  Last Fill Quantity: 90,  # refills: 3   Last office visit provider:  1/3/2023     Requested Prescriptions   Pending Prescriptions Disp Refills     ASPIRIN LOW DOSE 81 MG EC tablet [Pharmacy Med Name: ASPIRIN LOW DOSE 81 MG TBEC 81 Tablet] 90 tablet 3     Sig: TAKE 1 TABLET (81 MG TOTAL) BY MOUTH DAILY.       Analgesics (Non-Narcotic Tylenol and ASA Only) Passed - 2/22/2023  9:30 PM        Passed - Recent (12 mo) or future (30 days) visit within the authorizing provider's specialty     Patient has had an office visit with the authorizing provider or a provider within the authorizing providers department within the previous 12 mos or has a future within next 30 days. See \"Patient Info\" tab in inbasket, or \"Choose Columns\" in Meds & Orders section of the refill encounter.              Passed - Patient is age 20 years or older     If ASA is flagged for ages under 20 years old. Forward to provider for confirmation Ryes Syndrome is not a concern.              Passed - Medication is active on med list             Martine Gillette RN 02/22/23 9:30 PM  "

## 2023-02-23 NOTE — LETTER
2/23/2023         RE: Kulwant Amin  [address] [facility] [address]        Dear Colleague,    Thank you for referring your patient, Kulwant Amin, to the [facility]. Please see a copy of my visit note below.    Kulwant is a 53 year old who is being evaluated via a billable video visit.      How would you like to obtain your AVS? MyChart  If the video visit is dropped, the invitation should be resent by:   Will anyone else be joining your video visit? No              Subjective   Kulwant is a 53 year old, presenting for the following health issues:  RECHECK (Dupixent, injections are going okay, symptoms come back after 1 week. )      HPI     Chief complaint: Follow-up asthma    History of present illness: This is a pleasant 53-year-old woman last seen in August.  At that time she is not using her Dupixent regularly.  We had her restart the Dupixent 300 mg every 2 weeks.  With this she feels that she had better control of her asthma but still coughs.  In review of the record, she did have a hospitalization for upper respiratory tract infection exacerbated asthma in October.  However at that time she had only done Dupixent for approximately 6-8.  She reports that she still coughs when she is due for medication.  She believes only last week to week and a half before she starts coughing again.  She states she is coughing currently and due for injection soon.  She uses a rescue inhaler occasionally and that helps but she would like a cough medication.  She reports she is coughing some at night but sleeping okay.  She reports some shortness of breath and wheezing.  She also numbness and tingling in hands and feet and last eosinophil count checked in February was 1000.  No eye irritation.  Eosinophil count is stable.  Continues on Breo 1 puff a day and states she is due for refill but it was somewhat unclear from history.  She reports the cold weather seems to trigger her asthma symptoms  of coughing and shortness of breath.    Review of Systems        Objective           Vitals:  No vitals were obtained today due to virtual visit.    Physical Exam   GENERAL: Healthy, alert and no distress  EYES: Eyes grossly normal to inspection.  No discharge or erythema, or obvious scleral/conjunctival abnormalities.  RESP: No audible wheeze, cough, or visible cyanosis.  No visible retractions or increased work of breathing.    SKIN: Visible skin clear. No significant rash, abnormal pigmentation or lesions.  NEURO: Cranial nerves grossly intact.  Mentation and speech appropriate for age.  PSYCH: Mentation appears normal, affect normal/bright, judgement and insight intact, normal speech and appearance well-groomed.    Impression report and plan:  1.  Severe persistent asthma    She is on the top dose of her Dupixent.  Continue this every 2 weeks.  Suspect exacerbation last fall was due to the fact that Dupixent has not had adequate time to take effect.  I did also be more liberal with her albuterol a few days before her next injection given that this will likely with her cough.  Continue Breo and Spiriva as prescribed by the pulmonologist.  I do not think numbness and tingling in hands and feet are secondary to eosinophilic vasculitis.  Check eosinophil count at next visit and check a spirometry.  I like her to follow in 6 months.  If her cough worsens, she should notify.  May need prednisone but she was talking in full sentences without cough throughout her visit today.  I stated I do not prefer to prescribe cough medicine for asthmatic cough.  She will note the albuterol inhaler does not improve cough.           Video-Visit Details    Type of service:  Video Visit   Video Start Time: 1032  Video End Time:10:51 AM    Originating Location (pt. Location): Home    Distant Location (provider location):  Off-site  Platform used for Video Visit: AmWell        Again, thank you for allowing me to participate in the care  of your patient.        Sincerely,        Elizabeth REYNOSO MD

## 2023-02-27 ENCOUNTER — TRANSFERRED RECORDS (OUTPATIENT)
Dept: HEALTH INFORMATION MANAGEMENT | Facility: CLINIC | Age: 53
End: 2023-02-27
Payer: COMMERCIAL

## 2023-02-27 LAB — RETINOPATHY: NEGATIVE

## 2023-02-28 ENCOUNTER — TELEPHONE (OUTPATIENT)
Dept: PULMONOLOGY | Facility: CLINIC | Age: 53
End: 2023-02-28
Payer: COMMERCIAL

## 2023-02-28 DIAGNOSIS — J45.50 SEVERE PERSISTENT ASTHMA WITHOUT COMPLICATION (H): ICD-10-CM

## 2023-02-28 RX ORDER — FLUTICASONE FUROATE AND VILANTEROL 200; 25 UG/1; UG/1
1 POWDER RESPIRATORY (INHALATION) DAILY
Qty: 60 EACH | Refills: 11 | Status: SHIPPED | OUTPATIENT
Start: 2023-02-28 | End: 2024-03-08

## 2023-02-28 RX ORDER — PREDNISONE 20 MG/1
TABLET ORAL
Qty: 14 TABLET | Refills: 0 | Status: SHIPPED | OUTPATIENT
Start: 2023-02-28 | End: 2023-03-21

## 2023-02-28 NOTE — TELEPHONE ENCOUNTER
Phone call from patient's daughter-in-law, Billy.  States that patient needs refill on Breo ellipta inhaler.  Patient also has had cough for past week.  Dry cough, non-productive. Asking for prednisone for the cough.    Both prescriptions sent to her pharmacy

## 2023-03-02 ENCOUNTER — TELEPHONE (OUTPATIENT)
Dept: PULMONOLOGY | Facility: CLINIC | Age: 53
End: 2023-03-02
Payer: COMMERCIAL

## 2023-03-02 DIAGNOSIS — Z76.0 ENCOUNTER FOR MEDICATION REFILL: ICD-10-CM

## 2023-03-02 NOTE — TELEPHONE ENCOUNTER
Prior Authorization Retail Medication Request    Medication/Dose: Breo ellipta inhaler 200-25mcg  ICD code (if different than what is on RX):    Previously Tried and Failed:  Flovent, Spiriva, prednisone  Rationale: not effective    Insurance Name:[employer]ESS [employer]ILITY [place]  Insurance ID: [id number]      Pharmacy Information (if different than what is on RX)  Name:  [facility]  Phone:  [phone]      Patient has been on this medicaton since 2021.  It is working well for her and keeping her out of the hospital.  Minimal exacerbations

## 2023-03-02 NOTE — TELEPHONE ENCOUNTER
Prior Authorization Approval    Authorization Effective Date: 1/31/2023  Authorization Expiration Date: 3/1/2024  Medication: Breo ellipta inhaler 200-25mcg  Approved Dose/Quantity:   Reference #:     Insurance Company: [employer] - Phone [phone] Fax [phone]  Expected CoPay:       CoPay Card Available:      Foundation Assistance Needed:    Which Pharmacy is filling the prescription (Not needed for infusion/clinic administered): [facility] - [place], [place] - [address]  Pharmacy Notified: Yes  Patient Notified: Yes

## 2023-03-02 NOTE — TELEPHONE ENCOUNTER
Central Prior Authorization Team   Phone: [phone]    PA Initiation    Medication: Breo ellipta inhaler 200-25mcg  Insurance Company: [employer] - Phone [phone] Fax [phone]  Pharmacy Filling the Rx: [facility] - [place], [place] - [address]  Filling Pharmacy Phone: [phone]  Filling Pharmacy Fax:    Start Date: 3/2/2023

## 2023-03-03 RX ORDER — ATORVASTATIN CALCIUM 80 MG/1
TABLET, FILM COATED ORAL
Qty: 30 TABLET | Refills: 3 | Status: SHIPPED | OUTPATIENT
Start: 2023-03-03 | End: 2023-06-22

## 2023-03-14 ENCOUNTER — OFFICE VISIT (OUTPATIENT)
Dept: FAMILY MEDICINE | Facility: CLINIC | Age: 53
End: 2023-03-14
Payer: COMMERCIAL

## 2023-03-14 ENCOUNTER — TELEPHONE (OUTPATIENT)
Dept: FAMILY MEDICINE | Facility: CLINIC | Age: 53
End: 2023-03-14

## 2023-03-14 VITALS
HEIGHT: 61 IN | TEMPERATURE: 98.2 F | HEART RATE: 102 BPM | SYSTOLIC BLOOD PRESSURE: 108 MMHG | DIASTOLIC BLOOD PRESSURE: 66 MMHG | RESPIRATION RATE: 16 BRPM | BODY MASS INDEX: 24.02 KG/M2 | OXYGEN SATURATION: 95 % | WEIGHT: 127.25 LBS

## 2023-03-14 DIAGNOSIS — E11.9 TYPE 2 DIABETES MELLITUS TREATED WITH INSULIN (H): ICD-10-CM

## 2023-03-14 DIAGNOSIS — I25.10 CORONARY ARTERY DISEASE DUE TO LIPID RICH PLAQUE: ICD-10-CM

## 2023-03-14 DIAGNOSIS — I25.83 CORONARY ARTERY DISEASE DUE TO LIPID RICH PLAQUE: ICD-10-CM

## 2023-03-14 DIAGNOSIS — K21.9 GASTROESOPHAGEAL REFLUX DISEASE WITHOUT ESOPHAGITIS: Chronic | ICD-10-CM

## 2023-03-14 DIAGNOSIS — Z13.220 SCREENING FOR HYPERLIPIDEMIA: ICD-10-CM

## 2023-03-14 DIAGNOSIS — J44.9 CHRONIC OBSTRUCTIVE PULMONARY DISEASE, UNSPECIFIED COPD TYPE (H): Chronic | ICD-10-CM

## 2023-03-14 DIAGNOSIS — Z79.4 TYPE 2 DIABETES MELLITUS TREATED WITH INSULIN (H): ICD-10-CM

## 2023-03-14 DIAGNOSIS — F33.9 RECURRENT MAJOR DEPRESSIVE DISORDER, REMISSION STATUS UNSPECIFIED (H): Primary | Chronic | ICD-10-CM

## 2023-03-14 DIAGNOSIS — I10 ESSENTIAL HYPERTENSION: ICD-10-CM

## 2023-03-14 LAB
ANION GAP SERPL CALCULATED.3IONS-SCNC: 16 MMOL/L (ref 7–15)
BUN SERPL-MCNC: 10.1 MG/DL (ref 6–20)
CALCIUM SERPL-MCNC: 9.1 MG/DL (ref 8.6–10)
CHLORIDE SERPL-SCNC: 101 MMOL/L (ref 98–107)
CHOLEST SERPL-MCNC: 125 MG/DL
CREAT SERPL-MCNC: 0.56 MG/DL (ref 0.51–0.95)
DEPRECATED HCO3 PLAS-SCNC: 21 MMOL/L (ref 22–29)
GFR SERPL CREATININE-BSD FRML MDRD: >90 ML/MIN/1.73M2
GLUCOSE SERPL-MCNC: 226 MG/DL (ref 70–99)
HBA1C MFR BLD: 7.4 % (ref 0–5.6)
HDLC SERPL-MCNC: 41 MG/DL
LDLC SERPL CALC-MCNC: 41 MG/DL
NONHDLC SERPL-MCNC: 84 MG/DL
POTASSIUM SERPL-SCNC: 4.1 MMOL/L (ref 3.4–5.3)
SODIUM SERPL-SCNC: 138 MMOL/L (ref 136–145)
TRIGL SERPL-MCNC: 214 MG/DL

## 2023-03-14 PROCEDURE — 83036 HEMOGLOBIN GLYCOSYLATED A1C: CPT | Performed by: FAMILY MEDICINE

## 2023-03-14 PROCEDURE — 36415 COLL VENOUS BLD VENIPUNCTURE: CPT | Performed by: FAMILY MEDICINE

## 2023-03-14 PROCEDURE — 80048 BASIC METABOLIC PNL TOTAL CA: CPT | Performed by: FAMILY MEDICINE

## 2023-03-14 PROCEDURE — 80061 LIPID PANEL: CPT | Performed by: FAMILY MEDICINE

## 2023-03-14 PROCEDURE — 99214 OFFICE O/P EST MOD 30 MIN: CPT | Performed by: FAMILY MEDICINE

## 2023-03-14 RX ORDER — OMEPRAZOLE 40 MG/1
40 CAPSULE, DELAYED RELEASE ORAL DAILY
Qty: 90 CAPSULE | Refills: 3 | Status: SHIPPED | OUTPATIENT
Start: 2023-03-14 | End: 2024-05-13

## 2023-03-14 RX ORDER — SUCRALFATE ORAL 1 G/10ML
1 SUSPENSION ORAL 4 TIMES DAILY
Qty: 414 ML | Refills: 0 | Status: SHIPPED | OUTPATIENT
Start: 2023-03-14 | End: 2023-05-15

## 2023-03-14 ASSESSMENT — PATIENT HEALTH QUESTIONNAIRE - PHQ9
SUM OF ALL RESPONSES TO PHQ QUESTIONS 1-9: 11
SUM OF ALL RESPONSES TO PHQ QUESTIONS 1-9: 11
10. IF YOU CHECKED OFF ANY PROBLEMS, HOW DIFFICULT HAVE THESE PROBLEMS MADE IT FOR YOU TO DO YOUR WORK, TAKE CARE OF THINGS AT HOME, OR GET ALONG WITH OTHER PEOPLE: SOMEWHAT DIFFICULT

## 2023-03-14 NOTE — PROGRESS NOTES
Recurrent major depressive disorder, remission status unspecified (H)  Stable    Essential hypertension  Controlled.  - Basic metabolic panel  (Ca, Cl, CO2, Creat, Gluc, K, Na, BUN)    Type 2 diabetes mellitus treated with insulin (H)  A1c 7.4, was 8.6.  No medication changes.  Follow-up in 3 months.  - Hemoglobin A1c  - metFORMIN (GLUCOPHAGE) 1000 MG tablet; Take 1 tablet (1,000 mg) by mouth 2 times daily (with meals)      Coronary artery disease due to lipid rich plaque  - Lipid panel reflex to direct LDL Non-fasting    Chronic obstructive pulmonary disease, unspecified COPD type (H)  Managed by pulmonology.  Hospital bed ordered per request.  - Hospital Bed Order for DME - ONLY FOR DME    Gastroesophageal reflux disease without esophagitis  Discontinue Pepcid.  Continue omeprazole and sucralfate.  - sucralfate (CARAFATE) 1 GM/10ML suspension; Take 10 mLs (1 g) by mouth 4 times daily  - omeprazole (PRILOSEC) 40 MG DR capsule; Take 1 capsule (40 mg) by mouth daily    Doug Blum is a 53 year old accompanied by her daughter in law, presenting for follow up.   Using freestyle silvana, forgot to bring the reader.  Daughter-in-law states her blood sugars been stable in the low 100s lately.  Using Lantus 10 units with mealtime insulin 10 units.  Metformin 1000 mg twice a day.  Recently had virtual visit with pulmonology, breathing has been stable.  She has been using hospital bed for about 8 years, requesting a new one.  She reports shortness of breath improved when elevate head on bed.  She takes Pepcid, omeprazole and sucralfate for GERD.  No acute heartburn symptoms.  She sees multiple specialists including neurology, pulmonology and neurology.      Review of Systems   CONSTITUTIONAL: NEGATIVE for fever, chills, change in weight  CV: NEGATIVE for chest pain, palpitations or peripheral edema      Objective    /66 (BP Location: Right arm, Patient Position: Sitting, Cuff Size: Adult Regular)   Pulse 102    "Temp 98.2  F (36.8  C) (Oral)   Resp 16   Ht 1.549 m (5' 1\")   Wt 57.7 kg (127 lb 4 oz)   SpO2 95%   BMI 24.04 kg/m    Body mass index is 24.04 kg/m .  Physical Exam   GENERAL: healthy, alert and no distress  NECK: Supple  RESP: Coarse breath sounds, no wheezing.  CV: regular rate and rhythm, normal S1 S2, no S3 or S4, no murmur, click or rub, no peripheral edema and peripheral pulses strong  ABDOMEN: soft, nontender, no hepatosplenomegaly, no masses and bowel sounds normal  MS: no gross musculoskeletal defects noted, no edema  Diabetic foot exam: no trophic changes or ulcerative lesions and normal sensory exam  MOOD- Normal.                   Answers for HPI/ROS submitted by the patient on 3/14/2023  If you checked off any problems, how difficult have these problems made it for you to do your work, take care of things at home, or get along with other people?: Somewhat difficult  PHQ9 TOTAL SCORE: 11  What is the reason for your visit today? : Med Check      "

## 2023-03-14 NOTE — TELEPHONE ENCOUNTER
Spoke to Pa at [facility] to discontinue Cyclobenzaprine and Pepcid per Dr. Edgar request

## 2023-03-21 ENCOUNTER — OFFICE VISIT (OUTPATIENT)
Dept: PHARMACY | Facility: CLINIC | Age: 53
End: 2023-03-21
Payer: COMMERCIAL

## 2023-03-21 VITALS
BODY MASS INDEX: 23.76 KG/M2 | DIASTOLIC BLOOD PRESSURE: 68 MMHG | WEIGHT: 125.75 LBS | SYSTOLIC BLOOD PRESSURE: 118 MMHG

## 2023-03-21 DIAGNOSIS — K21.9 GASTROESOPHAGEAL REFLUX DISEASE WITHOUT ESOPHAGITIS: ICD-10-CM

## 2023-03-21 DIAGNOSIS — Z86.73 H/O ARTERIAL ISCHEMIC STROKE: ICD-10-CM

## 2023-03-21 DIAGNOSIS — R42 DIZZINESS: ICD-10-CM

## 2023-03-21 DIAGNOSIS — Z95.5 S/P CORONARY ARTERY STENT PLACEMENT: ICD-10-CM

## 2023-03-21 DIAGNOSIS — K59.00 CONSTIPATION, UNSPECIFIED CONSTIPATION TYPE: ICD-10-CM

## 2023-03-21 DIAGNOSIS — Z79.4 TYPE 2 DIABETES MELLITUS TREATED WITH INSULIN (H): Primary | ICD-10-CM

## 2023-03-21 DIAGNOSIS — I25.10 CORONARY ARTERY DISEASE DUE TO LIPID RICH PLAQUE: ICD-10-CM

## 2023-03-21 DIAGNOSIS — R51.9 CHRONIC NONINTRACTABLE HEADACHE, UNSPECIFIED HEADACHE TYPE: Chronic | ICD-10-CM

## 2023-03-21 DIAGNOSIS — D72.119 HYPEREOSINOPHILIC SYNDROME, UNSPECIFIED TYPE: ICD-10-CM

## 2023-03-21 DIAGNOSIS — G89.29 CHRONIC NONINTRACTABLE HEADACHE, UNSPECIFIED HEADACHE TYPE: Chronic | ICD-10-CM

## 2023-03-21 DIAGNOSIS — E11.9 TYPE 2 DIABETES MELLITUS TREATED WITH INSULIN (H): Primary | ICD-10-CM

## 2023-03-21 DIAGNOSIS — R52 PAIN: ICD-10-CM

## 2023-03-21 DIAGNOSIS — F41.9 ANXIETY: ICD-10-CM

## 2023-03-21 DIAGNOSIS — I25.83 CORONARY ARTERY DISEASE DUE TO LIPID RICH PLAQUE: ICD-10-CM

## 2023-03-21 DIAGNOSIS — J45.50 SEVERE PERSISTENT ASTHMA, UNSPECIFIED WHETHER COMPLICATED (H): ICD-10-CM

## 2023-03-21 DIAGNOSIS — F33.9 RECURRENT MAJOR DEPRESSIVE DISORDER, REMISSION STATUS UNSPECIFIED (H): ICD-10-CM

## 2023-03-21 PROCEDURE — 99606 MTMS BY PHARM EST 15 MIN: CPT | Performed by: PHARMACIST

## 2023-03-21 PROCEDURE — 99607 MTMS BY PHARM ADDL 15 MIN: CPT | Performed by: PHARMACIST

## 2023-03-21 RX ORDER — MECLIZINE HCL 12.5 MG 12.5 MG/1
25 TABLET ORAL 3 TIMES DAILY PRN
Qty: 90 TABLET | Refills: 0 | Status: SHIPPED | OUTPATIENT
Start: 2023-03-21 | End: 2023-04-04

## 2023-03-21 RX ORDER — DOCUSATE SODIUM 100 MG/1
100 CAPSULE, LIQUID FILLED ORAL 2 TIMES DAILY PRN
Qty: 90 CAPSULE | Refills: 3 | Status: SHIPPED | OUTPATIENT
Start: 2023-03-21 | End: 2023-10-05

## 2023-03-21 NOTE — PATIENT INSTRUCTIONS
"Recommendations from today's MTM visit:                                                    MTM (medication therapy management) is a service provided by a clinical pharmacist designed to help you get the most of out of your medicines.      1. START docusate 1 tablet twice daily as needed for constipation   2. Continue to use Novolog 10 units twice daily with meals. Only use Novolog 15 mins before a meal, do not use Novolog if not eating a meal  2. Please increase to 3 tablets (30mg) of amitriptyline at bedtime. This has been updated in your pillboxes today   3. Check at home to see if you are taking Fluoxetine 10mg once daily in the morning  4. If needed, you can take sucralfate up to 4 times daily for the burning sensation in your stomach   6.  Take Spiriva 2 puffs once daily    Follow-up: Return in about 3 months (around 7/5/2023).    It was great speaking with you today.  I value your experience and would be very thankful for your time in providing feedback in our clinic survey. In the next few days, you may receive an email or text message from [employer] with a link to a survey related to your  clinical pharmacist.\"     To schedule another MTM appointment, please call the clinic directly or you may call the MTM scheduling line at [phone] or toll-free at [phone].     My Clinical Pharmacist's contact information:                                                      Please feel free to contact me with any questions or concerns you have.      Luna Yoo, Pharm.D., MPH  MTM Pharmacist Resident   [facility] / [facility]    "

## 2023-03-21 NOTE — PROGRESS NOTES
Medication Therapy Management (MTM) Encounter    ASSESSMENT:                            Medication Adherence/Access: Recommend continuation of home health nurse to assist with pillbox set up.      1. Type 2 diabetes mellitus treated without insulin (H)  Most recent A1c not meeting goal of <7%, though improving quite well and close to goal. Patient currently taking max dose metformin and basal/bolus insulin.  Recommend continuation of current medication dosing but in the future, may benefit from increasing second dose of bolus insulin. Education provided regarding using Novolog before meals and to skip the dose if she is skipping a meal.      2. Gastroesophageal reflux disease without esophagitis  Patient notes that her symptoms are chronic. Recently assessed and medications adjusted by PCP who recommended patient continue with PPI and sucralfate and to stop the H2 blocker. Recommend continuation of medications as suggested by PCP and education provided on the ability to use sucralfate up to 4 times daily as prescribed.      3. Severe persistent asthma, unspecified whether complicated/Hypereosinophilic syndrome/Allergies  Managed by pulmonologist/allergist. Recommend patient continue to follow closely with pulmonologist and use all medications and inhalers as prescribed. Education provided on the proper use and dosing of both Spiriva and Breo.      4. S/P coronary artery stent placement/CAD/HX Ischemic Stroke  Managed by cardiologist, has upcoming appointment.  No changes recommended to medications today. Education provided to patient on signs and symptoms that may warrant a visit to the ED for further evaluation.      5. Headache   Recommend continuing to follow with neurology. No changes recommended today.     6. Dizziness   Patient may benefit from continuing to use meclizine as needed. Refill sent to pharmacy.     7. Pain  Patient with chronic pain, recommend continuation of current regimen. Of note, patient is  no longer using Advil and this was removed from medication list today.      8. Depression/Anxiety   Recommend patient increase amitriptyline as prescribed by neurologist. Unclear if patient is taking fluoxetine, encouraged patient to bring in bottle of fluoxetine to next visit for full assessment.     9. Constipation  Patient may benefit from additional agent such as docusate for constipation. Prescription sent to pharmacy and patient and daughter understand instructions.     PLAN:                            1. START docusate 1 tablet twice daily as needed for constipation   2. Continue to use Novolog 10 units twice daily with meals. Only use Novolog 15 mins before a meal, do not use Novolog if not eating a meal  3. Increase to 3 tablets (30mg) of amitriptyline at bedtime. This has been updated in your pillboxes today   4. Check at home to see if you are taking Fluoxetine 10mg once daily in the morning  5. If needed, you can take sucralfate up to 4 times daily for the burning sensation in your stomach   6.  Take Spiriva 2 puffs once daily    Follow-up: Return in about 3 months (around 7/5/2023).   PCP 6/29  SUBJECTIVE/OBJECTIVE:                          Kulwant Amin is a 53 year old female coming in for a follow-up visit. Patient was accompanied by her daughter . Today's visit is a follow-up MTM visit from 1/18/23. Patient and her daughter decline professional  and prefer to have the patient's daughter translate.      Reason for visit: MTM follow up.    Allergies/ADRs: Reviewed in chart  Past Medical History: Reviewed in chart  Tobacco: She reports that she has never smoked. She has never used smokeless tobacco.    Medication Adherence/Access: Patient has a home health nurse who sets up medications in a weekly pillbox three times a day. Per patient and her daughter, she never misses any doses of her medications.     Type 2 Diabetes:  Metformin 1000 mg twice daily with food, Lantus 40 units once  daily, Novolog 10 units twice daily before meals   - Reports chronic abdominal pain but states this is not new for her     Blood sugar monitoring: Freestyle Gemma CGM    Hypoglycemia? None per CGM. Aware of how to correct for lows with sugar water.   Hyperglycemia? Polydipsia, polyphagia, polyuria occasionally but nothing consistent  Diet/Exercise: Reports eating 2 meals per day, usually eats brown rice, beans, lentils, veggies  Aspirin 81mg daily for secondary prevention  Statin: atorvastatin 80mg every day   ACEi/ARB: No, not indicated at this time   Lab Results   Component Value Date    A1C 7.4 03/14/2023    A1C 8.6 11/30/2022    A1C 10.2 08/25/2022     Creatinine   Date Value Ref Range Status   03/14/2023 0.56 0.51 - 0.95 mg/dL Final   06/18/2015 0.7 0.5 - 1.0 mg/dL Final      Latest Reference Range & Units Most Recent   Albumin Urine mg/L mg/L <12.0  11/30/22 12:29       GERD: omeprazole 40 mg daily and prescribed sucralfate 1 GM four times daily (has been taking about 2 times daily)  - Recently seen by PCP on 3/14/23 who stopped famotide and recommended continuation with omeprazole and sucralfate  - Patient states she consistently has a burning sensation in her stomach that is chronic and is not sure if the medications are helping but thinks they might be   - Per daughter, patient no longer has TUMS and is wondering if this can be refilled today  Creatinine   Date Value Ref Range Status   03/14/2023 0.56 0.51 - 0.95 mg/dL Final   06/18/2015 0.7 0.5 - 1.0 mg/dL Final     Allergies/asthma/hypereosinophilia: loratadine 10mg daily, montelukast 10 mg daily at bedtime, Breo Ellipta 200/25mcg 1 puff daily, she is also prescribed Spiriva 2.5 mcg 2 puffs daily but is taking 1 puff twice daily, albuterol HFA daily as needed, and Dupixent 300 mg every 2 weeks injections. Patient also reports using a nebulizer 2-3 times per day but unsure if she is using an albuterol neb or Duoneb solution. Per last visit with  pulmonology, patient was to continue with albuterol nebulizer three times daily.   - Pulmonologist: [place] [facility], Tamra Mohr. Last visit 3/1/23 and has upcoming appt in April.   - Allergy & immunology: [place] [facility], Dr. Elizabeth Marie. Last visit 2/23/23 and has upcoming appt in August.      SVT/CAD: Aspirin 81 mg daily, atorvastatin 80 mg daily, metoprolol tartrate 25 mg twice daily, and hydrochlorothiazide 12.5mg every day  - Reports occasionally having a headache, dizziness and chest pain but none of these are new symptoms for her, ongoing and chronic  - Cardiologist: Del Hirsch MD. last visit was 2/16/2023    Headache: Tylenol 500mg 2 tablets every 8 hours as needed and she last took a dose last week   - She does have a history of stroke and since then always has a headache  - Follows with Dr. Prescott at the Neurology clinic in [place]. She was last seen 2/15/23 and has follow up appt in May. At her last visit, her amitriptyline dose was increased    Dizziness: Has meclizine 12.5 mg 2 tablets three times daily as needed for dizziness but this is not with her today and her daughter states they need a refill. States she is always dizzy. She last took a dose of meclizine last week.    Pain: amitrityline 30mg mg at bedtime which was increased by neurology earlier this month. Unfortunately, patient has not yet started the increased dose.  She also uses Voltaren 1% gel and gabapentin 300mg which is prescribed 2 capsules three times daily but takes 1 capsule in AM, 1capsule in the afternoon and 2 capsules at bedtime    - PMH: Cyclobenzaprine 1-2 tablets three times daily as needed but patient no longer has any more and there are no refills  - Reports chronic pain finds the topical gel to be helpful with pain      Anxiety/Depression: patient is prescribed [id number] once daily but this is not with the patient and is not set up in her pillbox. Per the daughter, they have an empty  bottle at home but she is taking it everyday. She is also prescribed amitriptyline 30mg at bedtime but still has her old dose of 20mg at bedtime set up in her pillbox  -  Was taking alprazolam as prescribed in ED, patient found this helpful, PCP preferred to start her on maintenance meds for mood rather than as needed benzodiazepine   - States her mood fluctuates up and down   - She reports her sleep is variable   PHQ 10/25/2022 12/22/2022 3/14/2023   PHQ-9 Total Score 14 15 11   Q9: Thoughts of better off dead/self-harm past 2 weeks Not at all Not at all Not at all   F/U: Thoughts of suicide or self-harm - - -   F/U: Safety concerns - - -     Constipation: Miralax 17 g daily  - Still having constipation and her last BM was yesterday and would like to take an additional agent to help with constipation     Today's Vitals: /68   Wt 125 lb 12 oz (57 kg)   BMI 23.76 kg/m    ----------------    I spent 30 minutes with this patient today. All changes were made via collaborative practice agreement with Adrien Cardoza MD. A copy of the visit note was provided to the patient's provider(s).    A summary of these recommendations was declined by the patient.    Luna Yoo, Pharm.D., MPH  MTM Pharmacist Resident   [facility] / [facility]    Preceptor cosignature: Patient was seen independently by Dr. Yoo. I have reviewed the assessment and plan. Malu Gil, PharmD, BCACP       Medication Therapy Recommendations  Constipation    Rationale: Synergistic therapy - Needs additional medication therapy - Indication   Recommendation: Start Medication - DOCUSATE SODIUM   Status: Accepted per CPA         COPD (chronic obstructive pulmonary disease) (H)    Current Medication: tiotropium (SPIRIVA RESPIMAT) 2.5 MCG/ACT inhaler   Rationale: Does not understand instructions - Adherence - Adherence   Recommendation: Provide Education   Status: Patient Agreed - Adherence/Education         GERD (gastroesophageal  reflux disease)    Current Medication: sucralfate (CARAFATE) 1 GM/10ML suspension   Rationale: Does not understand instructions - Adherence - Adherence   Recommendation: Provide Education   Status: Patient Agreed - Adherence/Education         Major depression    Current Medication: amitriptyline (ELAVIL) 10 MG tablet   Rationale: Does not understand instructions - Adherence - Adherence   Recommendation: Provide Education   Status: Patient Agreed - Adherence/Education

## 2023-03-22 DIAGNOSIS — K21.9 GASTROESOPHAGEAL REFLUX DISEASE WITHOUT ESOPHAGITIS: Chronic | ICD-10-CM

## 2023-03-22 RX ORDER — SUCRALFATE ORAL 1 G/10ML
1 SUSPENSION ORAL 4 TIMES DAILY
Qty: 414 ML | Refills: 0 | OUTPATIENT
Start: 2023-03-22

## 2023-03-24 DIAGNOSIS — Z53.9 DIAGNOSIS NOT YET DEFINED: Primary | ICD-10-CM

## 2023-03-24 PROCEDURE — G0179 MD RECERTIFICATION HHA PT: HCPCS | Performed by: FAMILY MEDICINE

## 2023-03-27 ENCOUNTER — HOSPITAL ENCOUNTER (EMERGENCY)
Facility: HOSPITAL | Age: 53
Discharge: HOME | End: 2023-03-27
Attending: EMERGENCY MEDICINE | Admitting: EMERGENCY MEDICINE
Payer: COMMERCIAL

## 2023-03-27 ENCOUNTER — APPOINTMENT (OUTPATIENT)
Dept: RADIOLOGY | Facility: HOSPITAL | Age: 53
End: 2023-03-27
Attending: EMERGENCY MEDICINE
Payer: COMMERCIAL

## 2023-03-27 VITALS
TEMPERATURE: 97.9 F | WEIGHT: 126 LBS | BODY MASS INDEX: 20.25 KG/M2 | DIASTOLIC BLOOD PRESSURE: 81 MMHG | OXYGEN SATURATION: 100 % | SYSTOLIC BLOOD PRESSURE: 136 MMHG | HEART RATE: 84 BPM | HEIGHT: 66 IN | RESPIRATION RATE: 16 BRPM

## 2023-03-27 DIAGNOSIS — R07.9 CHEST PAIN, UNSPECIFIED TYPE: ICD-10-CM

## 2023-03-27 LAB
ALBUMIN SERPL BCG-MCNC: 3.9 G/DL (ref 3.5–5.2)
ALP SERPL-CCNC: 85 U/L (ref 35–104)
ALT SERPL W P-5'-P-CCNC: 27 U/L (ref 10–35)
ANION GAP SERPL CALCULATED.3IONS-SCNC: 9 MMOL/L (ref 7–15)
AST SERPL W P-5'-P-CCNC: 32 U/L (ref 10–35)
ATRIAL RATE - MUSE: 74 BPM
BASOPHILS # BLD AUTO: 0 10E3/UL (ref 0–0.2)
BASOPHILS NFR BLD AUTO: 1 %
BILIRUB SERPL-MCNC: 0.3 MG/DL
BUN SERPL-MCNC: 5.9 MG/DL (ref 6–20)
CALCIUM SERPL-MCNC: 9 MG/DL (ref 8.6–10)
CHLORIDE SERPL-SCNC: 106 MMOL/L (ref 98–107)
CREAT SERPL-MCNC: 0.51 MG/DL (ref 0.51–0.95)
DEPRECATED HCO3 PLAS-SCNC: 26 MMOL/L (ref 22–29)
DIASTOLIC BLOOD PRESSURE - MUSE: NORMAL MMHG
EOSINOPHIL # BLD AUTO: 1.2 10E3/UL (ref 0–0.7)
EOSINOPHIL NFR BLD AUTO: 17 %
ERYTHROCYTE [DISTWIDTH] IN BLOOD BY AUTOMATED COUNT: 15.3 % (ref 10–15)
GFR SERPL CREATININE-BSD FRML MDRD: >90 ML/MIN/1.73M2
GLUCOSE SERPL-MCNC: 148 MG/DL (ref 70–99)
HCT VFR BLD AUTO: 38.1 % (ref 35–47)
HGB BLD-MCNC: 11.6 G/DL (ref 11.7–15.7)
IMM GRANULOCYTES # BLD: 0 10E3/UL
IMM GRANULOCYTES NFR BLD: 0 %
INTERPRETATION ECG - MUSE: NORMAL
LIPASE SERPL-CCNC: 49 U/L (ref 13–60)
LYMPHOCYTES # BLD AUTO: 2 10E3/UL (ref 0.8–5.3)
LYMPHOCYTES NFR BLD AUTO: 28 %
MCH RBC QN AUTO: 25.2 PG (ref 26.5–33)
MCHC RBC AUTO-ENTMCNC: 30.4 G/DL (ref 31.5–36.5)
MCV RBC AUTO: 83 FL (ref 78–100)
MONOCYTES # BLD AUTO: 0.5 10E3/UL (ref 0–1.3)
MONOCYTES NFR BLD AUTO: 7 %
NEUTROPHILS # BLD AUTO: 3.4 10E3/UL (ref 1.6–8.3)
NEUTROPHILS NFR BLD AUTO: 47 %
NRBC # BLD AUTO: 0 10E3/UL
NRBC BLD AUTO-RTO: 0 /100
P AXIS - MUSE: 42 DEGREES
PLATELET # BLD AUTO: 217 10E3/UL (ref 150–450)
POTASSIUM SERPL-SCNC: 4.4 MMOL/L (ref 3.4–5.3)
PR INTERVAL - MUSE: 168 MS
PROT SERPL-MCNC: 6.9 G/DL (ref 6.4–8.3)
QRS DURATION - MUSE: 90 MS
QT - MUSE: 414 MS
QTC - MUSE: 459 MS
R AXIS - MUSE: -17 DEGREES
RBC # BLD AUTO: 4.61 10E6/UL (ref 3.8–5.2)
SODIUM SERPL-SCNC: 141 MMOL/L (ref 136–145)
SYSTOLIC BLOOD PRESSURE - MUSE: NORMAL MMHG
T AXIS - MUSE: 42 DEGREES
TROPONIN T SERPL HS-MCNC: <6 NG/L
VENTRICULAR RATE- MUSE: 74 BPM
WBC # BLD AUTO: 7.2 10E3/UL (ref 4–11)

## 2023-03-27 PROCEDURE — 250N000013 HC RX MED GY IP 250 OP 250 PS 637: Performed by: EMERGENCY MEDICINE

## 2023-03-27 PROCEDURE — 99285 EMERGENCY DEPT VISIT HI MDM: CPT | Mod: 25

## 2023-03-27 PROCEDURE — 71046 X-RAY EXAM CHEST 2 VIEWS: CPT

## 2023-03-27 PROCEDURE — 250N000009 HC RX 250: Performed by: EMERGENCY MEDICINE

## 2023-03-27 PROCEDURE — 84484 ASSAY OF TROPONIN QUANT: CPT | Performed by: EMERGENCY MEDICINE

## 2023-03-27 PROCEDURE — 36415 COLL VENOUS BLD VENIPUNCTURE: CPT | Performed by: EMERGENCY MEDICINE

## 2023-03-27 PROCEDURE — 93005 ELECTROCARDIOGRAM TRACING: CPT | Performed by: EMERGENCY MEDICINE

## 2023-03-27 PROCEDURE — 83690 ASSAY OF LIPASE: CPT | Performed by: EMERGENCY MEDICINE

## 2023-03-27 PROCEDURE — 85025 COMPLETE CBC W/AUTO DIFF WBC: CPT | Performed by: EMERGENCY MEDICINE

## 2023-03-27 PROCEDURE — 80053 COMPREHEN METABOLIC PANEL: CPT | Performed by: EMERGENCY MEDICINE

## 2023-03-27 RX ORDER — ASPIRIN 325 MG
325 TABLET ORAL ONCE
Status: COMPLETED | OUTPATIENT
Start: 2023-03-27 | End: 2023-03-27

## 2023-03-27 RX ORDER — CALCIUM CARBONATE 500 MG/1
1 TABLET, CHEWABLE ORAL 2 TIMES DAILY PRN
Qty: 30 TABLET | Refills: 0 | Status: SHIPPED | OUTPATIENT
Start: 2023-03-27 | End: 2024-02-15

## 2023-03-27 RX ADMIN — ASPIRIN 325 MG ORAL TABLET 325 MG: 325 PILL ORAL at 10:07

## 2023-03-27 RX ADMIN — LIDOCAINE HYDROCHLORIDE 30 ML: 20 SOLUTION ORAL; TOPICAL at 10:08

## 2023-03-27 ASSESSMENT — HEART SCORE
TROPONIN: LESS THAN OR EQUAL TO NORMAL LIMIT
HEART SCORE: 4
AGE: 45-64
RISK FACTORS: >2 RISK FACTORS OR HX OF ATHEROSCLEROTIC DISEASE
HISTORY: MODERATELY SUSPICIOUS
ECG: NORMAL

## 2023-03-27 NOTE — ED TRIAGE NOTES
Patient reports two days of burning in her chest and SOB. Patient has a history a two cardiac stents placed in 2018.      Triage Assessment     Row Name 03/27/23 0741       Triage Assessment (Adult)    Airway WDL WDL       Respiratory WDL    Respiratory WDL X  sob       Cardiac WDL    Cardiac WDL X  Buring in chest

## 2023-03-27 NOTE — ED PROVIDER NOTES
EMERGENCY DEPARTMENT ENCOUNTER      NAME: Kulwant Amin  AGE: 53 year old female  YOB: 1970  MRN: [id number]  EVALUATION DATE & TIME: No admission date for patient encounter.    PCP: Adrien Cardoza    ED PROVIDER: Ronna Ramirez M.D.      No chief complaint on file.        FINAL IMPRESSION:  1. Chest pain, unspecified type          ED COURSE & MEDICAL DECISION MAKING:    ED Course as of 03/27/23 0920   Mon Mar 27, 2023   0751 Patient with atypical HEART score 4 chest discomfort ongoing constantly for 2 days with reassuring and normal VS and EKG, different when compared to prior chest pains per her than when she needed stenting performed. She is amenable to ASA and GI cocktail, CXR, troponin and clinical reassessment   0840 CXR reassuringly without acute pathology apparent   0915 Pt with 2 days atypical chest pain with high sensitivity 6 thus ACS ruled out. LFTs WNL with normal lipase and CBC, EKG reassuring. With chest pain much improved with ASA and GI cocktail, patient ameanble to discharge with close RAC follow up with pain unlike prior ACS and undetectable troponin with tums Rx and plan to follow up with cardiology team this week for serial reassessment and to make sure she remains improved and to deterine whether they recommend further cardiac testing. Patient discharged after being provided with extensive anticipatory guidance and given return precautions, importance of PMD follow-up emphasized.        Pertinent Labs & Imaging studies reviewed. (See chart for details)    N95 worn  A face shield was worn also  COVID PPE    Medical Decision Making    History:    Supplemental history from: Documented in chart, if applicable and Family Member/Significant Other    External Record(s) reviewed: Documented in chart, if applicable.    Work Up:    Chart documentation includes differential considered and any EKGs or imaging independently interpreted by provider, where specified.    In additional to work up  documented, I considered the following work up: Documented in chart, if applicable.    External consultation:    Discussion of management with another provider: Documented in chart, if applicable    Complicating factors:    Care impacted by chronic illness: Chronic Lung Disease, Diabetes, Heart Disease, Hypertension and Smoking / Nicotine Use    Care affected by social determinants of health: N/A    Disposition considerations: Discharge. I prescribed additional prescription strength medication(s) as charted. I considered admission, but discharged the patient after share decision making conversation.    7:48 AM I met with patient for initial encounter.    At the conclusion of the encounter I discussed the results of all of the tests and the disposition. The questions were answered. The patient or family acknowledged understanding and was agreeable with the care plan.     MEDICATIONS GIVEN IN THE EMERGENCY:  Medications   aspirin (ASA) tablet 325 mg (has no administration in time range)   lidocaine (viscous) (XYLOCAINE) 2 % 15 mL, alum & mag hydroxide-simethicone (MAALOX) 15 mL GI Cocktail (has no administration in time range)       NEW PRESCRIPTIONS STARTED AT TODAY'S ER VISIT  New Prescriptions    CALCIUM CARBONATE (TUMS) 500 MG CHEWABLE TABLET    Take 1 tablet (500 mg) by mouth 2 times daily as needed for heartburn          =================================================================    HPI      Kulwant Amin is a 53 year old female with PMHx of COPD, HTN, CAD with stent, GERD, latent TB, DMII, who presents to the ED today via walk-in with chest pain.    Patient endorses chest pain that has been intermittent for the last 2 days but is constant today. She describes the pain as burning, 9/10 in severity. The pain is located in her mid front lower chest and is nonradiating. Daughter also notes a chronic cough, and states that patient uses tobacco. She denies any family history of heart attacks.    Patient denies  abdominal pain, vomiting, diarrhea, and all other complaints at this time.      REVIEW OF SYSTEMS   All other systems reviewed and are negative except as noted above in HPI.    PAST MEDICAL HISTORY:  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     Microcytic anemia 11/17/2019     S/P coronary artery stent placement 02/02/2018     TB lung, latent     9 mos INH       PAST SURGICAL HISTORY:  Past Surgical History:   Procedure Laterality Date     CORONARY STENT PLACEMENT  2018     CV CORONARY ANGIOGRAM N/A 1/25/2018    Procedure: Coronary Angiogram;  Surgeon: Angelo Serrano MD;  Location: [facility] Cath Lab;  Service:      CV CORONARY ANGIOGRAM N/A 5/5/2022    Procedure: Coronary Angiogram;  Surgeon: Irwin Cano MD;  Location: [facility] CATH LAB CV     CV CORONARY ANGIOGRAM  5/5/2022    Procedure: ;  Surgeon: Irwin Cano MD;  Location: [facility] CV     CA ESOPHAGOGASTRODUODENOSCOPY TRANSORAL DIAGNOSTIC N/A 12/10/2018    Procedure: ESOPHAGOGASTRODUODENOSCOPY (EGD);  Surgeon: Eddie Renteria MD;  Location: [facility];  Service: Gastroenterology     CA ESOPHAGOGASTRODUODENOSCOPY TRANSORAL DIAGNOSTIC N/A 12/3/2020    Procedure: ESOPHAGOGASTRODUODENOSCOPY (EGD) with biospies ;  Surgeon: Avi Crow MD;  Location: [facility];  Service: Gastroenterology     [facility] COLONOSCOPY W/WO BRUSH/WASH N/A 12/10/2018    Procedure: COLONOSCOPY with polypectomy using biopsy  forceps;  Surgeon: Eddie Renteria MD;  Location: [facility];  Service: Gastroenterology       CURRENT MEDICATIONS:    calcium carbonate (TUMS) 500 MG chewable tablet  acetaminophen (TYLENOL) 500 MG tablet  albuterol (PROAIR HFA/PROVENTIL HFA/VENTOLIN HFA) 108 (90 Base) MCG/ACT inhaler  albuterol (PROVENTIL) (2.5 MG/3ML) 0.083% neb solution  ALLERGY RELIEF 10 MG tablet  amitriptyline (ELAVIL) 10 MG tablet  ASPIRIN LOW DOSE 81 MG EC tablet  atorvastatin (LIPITOR) 80 MG tablet  B-D U/F 31G X 8 MM insulin pen needle  Continuous Blood Gluc Sensor (FREESTYLE MONICA 2 SENSOR) [facility]  CONTOUR NEXT TEST test strip  dextromethorphan (DELSYM) 30 MG/5ML liquid  diclofenac (VOLTAREN) 1 % topical gel  docusate sodium (COLACE) 100 MG capsule  dupilumab (DUPIXENT) 300 MG/2ML prefilled pen  FLUoxetine (PROZAC) 10 MG capsule  fluticasone-vilanterol (BREO ELLIPTA) 200-25 MCG/ACT inhaler  gabapentin (NEURONTIN) 300 MG capsule  Global Inject Ease Lancets 30G MISC  hydrochlorothiazide (MICROZIDE) 12.5 MG capsule  hydrocortisone (CORTAID) 1 % external cream  insulin aspart (NOVOLOG PEN) 100 UNIT/ML pen  insulin glargine (LANTUS PEN) 100 UNIT/ML pen  insulin pen needle (32G X 4 MM) 32G X 4 MM miscellaneous  ipratropium - albuterol 0.5 mg/2.5 mg/3 mL (DUONEB) 0.5-2.5 (3) MG/3ML neb solution  lidocaine (XYLOCAINE) 5 % external ointment  meclizine (ANTIVERT) 12.5 MG tablet  metFORMIN (GLUCOPHAGE) 1000 MG tablet  metoprolol tartrate (LOPRESSOR) 25 MG tablet  montelukast (SINGULAIR) 10 MG tablet  nystatin (MYCOSTATIN) 138373 UNIT/ML suspension  omeprazole (PRILOSEC) 40 MG DR capsule  Polyethylene Glycol 3350 (PEG 3350) 17 GM/SCOOP POWD  polyvinyl alcohol (ARTIFICIAL TEARS) 1.4 % ophthalmic solution  sucralfate (CARAFATE) 1 GM/10ML suspension  tiotropium (SPIRIVA RESPIMAT) 2.5 MCG/ACT inhaler        ALLERGIES:  No Known Allergies    FAMILY HISTORY:  Family History   Problem Relation Age of Onset     Other - See Comments Mother          of  "an intestinal problem     Ulcers Father          of gastritis     Breast Cancer No family hx of        SOCIAL HISTORY:   Social History     Socioeconomic History     Marital status:      Spouse name: None     Number of children: None     Years of education: None     Highest education level: None   Tobacco Use     Smoking status: Never     Smokeless tobacco: Never     Tobacco comments:     No passive exposure   Substance and Sexual Activity     Alcohol use: No     Drug use: No     Sexual activity: Yes     Partners: Male   Social History Narrative    2017 The patient lives with her daughter-in-law (who is present), , son, and 2 grandchildren (total of 6 people). [place].       VITALS:  Patient Vitals for the past 24 hrs:   BP Temp Temp src Pulse Resp SpO2 Height Weight   23 0741 -- -- -- -- -- -- 1.676 m (5' 6\") 57.2 kg (126 lb)   23 0740 (!) 140/89 97.9  F (36.6  C) Temporal 80 16 100 % -- --       PHYSICAL EXAM    GENERAL: Awake, alert.  In no acute distress.   HEENT: Normocephalic, atraumatic.  Pupils equal, round and reactive.  Conjunctiva normal.  EOMI.  NECK: No stridor or apparent deformity.  PULMONARY: Symmetrical breath sounds without distress.  Lungs clear to auscultation bilaterally without wheezes, rhonchi or rales.  CARDIO: Regular rate and rhythm.  No significant murmur, rub or gallop.  Radial pulses strong and symmetrical.  ABDOMINAL: Abdomen soft, non-distended and non-tender to palpation.  No CVAT, no palpable hepatosplenomegaly.  EXTREMITIES: No lower extremity swelling or edema.    NEURO: Alert and oriented to person, place and time.  Cranial nerves grossly intact.  No focal motor deficit.  PSYCH: Normal mood and affect  SKIN: No rashes      LAB:  All pertinent labs reviewed and interpreted.  Results for orders placed or performed during the hospital encounter of 23   XR Chest 2 Views    Impression    IMPRESSION: No pneumothorax or pleural effusion. No " focal consolidation. Cardiac silhouette within normal limits. Mildly atherosclerotic aorta.   Comprehensive metabolic panel   Result Value Ref Range    Sodium 141 136 - 145 mmol/L    Potassium 4.4 3.4 - 5.3 mmol/L    Chloride 106 98 - 107 mmol/L    Carbon Dioxide (CO2) 26 22 - 29 mmol/L    Anion Gap 9 7 - 15 mmol/L    Urea Nitrogen 5.9 (L) 6.0 - 20.0 mg/dL    Creatinine 0.51 0.51 - 0.95 mg/dL    Calcium 9.0 8.6 - 10.0 mg/dL    Glucose 148 (H) 70 - 99 mg/dL    Alkaline Phosphatase 85 35 - 104 U/L    AST 32 10 - 35 U/L    ALT 27 10 - 35 U/L    Protein Total 6.9 6.4 - 8.3 g/dL    Albumin 3.9 3.5 - 5.2 g/dL    Bilirubin Total 0.3 <=1.2 mg/dL    GFR Estimate >90 >60 mL/min/1.73m2   Result Value Ref Range    Troponin T, High Sensitivity <6 <=14 ng/L   Result Value Ref Range    Lipase 49 13 - 60 U/L   CBC with platelets and differential   Result Value Ref Range    WBC Count 7.2 4.0 - 11.0 10e3/uL    RBC Count 4.61 3.80 - 5.20 10e6/uL    Hemoglobin 11.6 (L) 11.7 - 15.7 g/dL    Hematocrit 38.1 35.0 - 47.0 %    MCV 83 78 - 100 fL    MCH 25.2 (L) 26.5 - 33.0 pg    MCHC 30.4 (L) 31.5 - 36.5 g/dL    RDW 15.3 (H) 10.0 - 15.0 %    Platelet Count 217 150 - 450 10e3/uL    % Neutrophils 47 %    % Lymphocytes 28 %    % Monocytes 7 %    % Eosinophils 17 %    % Basophils 1 %    % Immature Granulocytes 0 %    NRBCs per 100 WBC 0 <1 /100    Absolute Neutrophils 3.4 1.6 - 8.3 10e3/uL    Absolute Lymphocytes 2.0 0.8 - 5.3 10e3/uL    Absolute Monocytes 0.5 0.0 - 1.3 10e3/uL    Absolute Eosinophils 1.2 (H) 0.0 - 0.7 10e3/uL    Absolute Basophils 0.0 0.0 - 0.2 10e3/uL    Absolute Immature Granulocytes 0.0 <=0.4 10e3/uL    Absolute NRBCs 0.0 10e3/uL       RADIOLOGY:  Reviewed all pertinent imaging. Please see official radiology report.  XR Chest 2 Views   Final Result   IMPRESSION: No pneumothorax or pleural effusion. No focal consolidation. Cardiac silhouette within normal limits. Mildly atherosclerotic aorta.            EKG:    Reviewed and  interpreted as:   Performed at: 3/27/23 0748  Normal sinus rhythm  Rate: 74 bpm  Similar to prior from 2/5/23      I have independently reviewed and interpreted the EKG(s) documented above.        I, Jay Jay Calix, am serving as a scribe to document services personally performed by Dr. Ronna Ramirez based on my observation and the provider's statements to me. I, Ronna Ramirez MD attest that Jay Jay Calix is acting in a scribe capacity, has observed my performance of the services and has documented them in accordance with my direction.      Ronna Ramirez MD  03/27/23 0920

## 2023-03-27 NOTE — DISCHARGE INSTRUCTIONS
Our cardiology team will call you in the next day to help you set up a follow up appointment with them in their office this week so they can talk to you about whether they recommend additional testing of your heart.

## 2023-03-31 DIAGNOSIS — F41.9 ANXIETY: ICD-10-CM

## 2023-03-31 DIAGNOSIS — Z91.09 ENVIRONMENTAL ALLERGIES: ICD-10-CM

## 2023-03-31 RX ORDER — LORATADINE 10 MG/1
TABLET ORAL
Qty: 30 TABLET | Refills: 3 | Status: SHIPPED | OUTPATIENT
Start: 2023-03-31 | End: 2023-07-18

## 2023-03-31 RX ORDER — FLUOXETINE 10 MG/1
10 CAPSULE ORAL DAILY
Qty: 30 CAPSULE | Refills: 1 | Status: SHIPPED | OUTPATIENT
Start: 2023-03-31 | End: 2023-05-28

## 2023-03-31 NOTE — TELEPHONE ENCOUNTER
"Last Written Prescription Date:  10/25/2022  Last Fill Quantity: 90,  # refills: 1   Last office visit provider:  3/14/2023     Requested Prescriptions   Pending Prescriptions Disp Refills     FLUoxetine (PROZAC) 10 MG capsule [Pharmacy Med Name: FLUOXETINE HCL 10 MG CAP 10 Capsule] 30 capsule 1     Sig: TAKE 1 CAPSULE (10 MG) BY MOUTH DAILY       SSRIs Protocol Passed - 3/31/2023  8:31 AM        Passed - Recent (12 mo) or future (30 days) visit within the authorizing provider's specialty     Patient has had an office visit with the authorizing provider or a provider within the authorizing providers department within the previous 12 mos or has a future within next 30 days. See \"Patient Info\" tab in inbasket, or \"Choose Columns\" in Meds & Orders section of the refill encounter.              Passed - Medication is active on med list        Passed - Patient is age 18 or older        Passed - No active pregnancy on record        Passed - No positive pregnancy test in last 12 months             NAOMI CHUA RN 03/31/23 3:07 PM  "

## 2023-04-03 ENCOUNTER — OFFICE VISIT (OUTPATIENT)
Dept: CARDIOLOGY | Facility: CLINIC | Age: 53
End: 2023-04-03
Attending: EMERGENCY MEDICINE
Payer: COMMERCIAL

## 2023-04-03 VITALS
BODY MASS INDEX: 20.09 KG/M2 | SYSTOLIC BLOOD PRESSURE: 122 MMHG | DIASTOLIC BLOOD PRESSURE: 70 MMHG | HEIGHT: 66 IN | WEIGHT: 125 LBS | RESPIRATION RATE: 24 BRPM | HEART RATE: 94 BPM

## 2023-04-03 DIAGNOSIS — R42 DIZZINESS: ICD-10-CM

## 2023-04-03 DIAGNOSIS — R07.9 CHEST PAIN, UNSPECIFIED TYPE: ICD-10-CM

## 2023-04-03 PROCEDURE — 99214 OFFICE O/P EST MOD 30 MIN: CPT | Performed by: INTERNAL MEDICINE

## 2023-04-03 NOTE — LETTER
4/3/2023    Adrien Cardoza MD  [address] [place] [address] 1  [place] [address]    RE: Kulwant Amin       Dear Colleague,     I had the pleasure of seeing Kulwant Amin in the [facility].    HEART CARE ENCOUNTER NOTE      [facility]  [phone]      Assessment/Recommendations   Assessment:   1.  Atypical chest pain probably not due to progression of her underlying coronary disease or restenosis of her LAD stent.  This appeared widely patent on angiography last year.  The pain is also been more or less continuous since she was seen in the emergency department last week.  I suspect some musculoskeletal discomfort.  She does not look uncomfortable at all at this point.  Nevertheless she thinks it is her heart.      Plan:   1.  We will arrange a Lexiscan myocardial perfusion imaging study to rule out any evidence of inducible ischemia if this looks normal no further cardiac evaluation until she sees Dr. Hirsch for routine follow-up next year.           History of Present Illness/Subjective    HPI: Kulwant Amin is a 53 year old  female with ischemic heart disease followed by my colleague Dr. Hirsch he just saw her a month ago.  Last year she had a large LAD stent placed for symptoms of exertional angina.  She began to have some recurrent discomfort a  week ago on 3/27/2023.  Her serial troponins were negative.  ECG was unrevealing.  The discomfort seemed to respond to a GI cocktail per Dr. Garza.  She was referred in for follow-up of that ER visit.  The pain is not exertional rather it comes and goes throughout the day without any provoking activity.  She is unable to make it better with any activity such as eating or drinking water.  It does seem improved with antacids.    Studies reviewed:  ECG: 3/27/2023 personally reviewed; sinus rhythm, no acute ischemic changes.  Troponin less than 0.06         Physical Examination  Review of Systems   /70 (BP Location: Right arm, Patient  "Position: Sitting, Cuff Size: Adult Regular)   Pulse 94   Resp 24   Ht 1.676 m (5' 6\")   Wt 56.7 kg (125 lb)   BMI 20.18 kg/m    Body mass index is 20.18 kg/m .  Wt Readings from Last 3 Encounters:   04/03/23 56.7 kg (125 lb)   03/21/23 57 kg (125 lb 12 oz)   03/14/23 57.7 kg (127 lb 4 oz)       General Appearance:   Pleasant elderly female appears stated age. no acute distress, normal body habitus   ENT/Mouth: Oropharynx normal    EYES:  no scleral icterus, normal conjunctivae. No eyelid xanthelasma   Neck: No cervical lymphadenopathy  Thyroid not enlarged or nodular  No JVD   Respiratory:   lungs clear , no rales or wheezing, Normal chest wall expansion    Cardiovascular:   Regular rhythm, normal rate. Normal 1st and 2nd heart sounds.  No murmurs, no rubs or gallops;   radial pulses are full and equal   Jugular venous pressure is nonelevated   Abdomen/GI:  No tenderness, no organomegaly, no pulsatile masses. NBS.   Extremities: No cyanosis or clubbing.  No peripheral edema   Skin: No rash or lesions   Heme/lymph/ Immunology No lymphadenopathy, petechiae   Neurologic: Alert and oriented. No focal motor weakness, gait appears normal.       Psychiatric: Pleasant, calm, appropriate affect.          No known hepatic, renal, pulmonary, or CNS disorders. The remainder of his complete ROS is negative except as noted above.         Medical History  Surgical History Family History Social History   Past Medical History:   Diagnosis Date    Acute asthma exacerbation 01/06/2020    Anxiety     Arthritis     Asthma exacerbation 11/19/2015    Chronic obstructive pulmonary disease, unspecified COPD type (H)     COPD (chronic obstructive pulmonary disease) (H)     Coronary artery disease due to lipid rich plaque     Depression     Epigastric pain 12/15/2021    Essential hypertension     GERD (gastroesophageal reflux disease)     Infection due to 2019 novel coronavirus 01/03/2022    positive with COVID-19 on January 3, 2022    " Latent tuberculosis 2019    Microcytic anemia 2019    S/P coronary artery stent placement 2018    TB lung, latent     9 mos INH     Past Surgical History:   Procedure Laterality Date    CORONARY STENT PLACEMENT      CV CORONARY ANGIOGRAM N/A 2018    Procedure: Coronary Angiogram;  Surgeon: Angelo Serrano MD;  Location: [facility] Cath Lab;  Service:     CV CORONARY ANGIOGRAM N/A 2022    Procedure: Coronary Angiogram;  Surgeon: Irwin Cano MD;  Location: [facility] CATH LAB CV    CV CORONARY ANGIOGRAM  2022    Procedure: ;  Surgeon: Irwin Cano MD;  Location: [facility] CV    [facility] ESOPHAGOGASTRODUODENOSCOPY TRANSORAL DIAGNOSTIC N/A 12/10/2018    Procedure: ESOPHAGOGASTRODUODENOSCOPY (EGD);  Surgeon: Eddie Renteria MD;  Location: [facility];  Service: Gastroenterology    VA ESOPHAGOGASTRODUODENOSCOPY TRANSORAL DIAGNOSTIC N/A 12/3/2020    Procedure: ESOPHAGOGASTRODUODENOSCOPY (EGD) with biospies ;  Surgeon: Avi Crow MD;  Location: [facility];  Service: Gastroenterology    [facility] COLONOSCOPY W/WO BRUSH/WASH N/A 12/10/2018    Procedure: COLONOSCOPY with polypectomy using biopsy forceps;  Surgeon: Eddie Renteria MD;  Location: [facility];  Service: Gastroenterology     Family History   Problem Relation Age of Onset    Other - See Comments Mother          of an intestinal problem    Ulcers Father          of gastritis    Breast Cancer No family hx of         Social History     Socioeconomic History    Marital status:      Spouse name: Not on file    Number of children: Not on file    Years of education: Not on file    Highest education level: Not on file   Occupational History    Not on file   Tobacco Use    Smoking status: Never    Smokeless tobacco: Never    Tobacco comments:     No passive exposure   Vaping Use    Vaping status: Not on file   Substance and Sexual Activity    Alcohol use: No    Drug use: No    Sexual activity:  Yes     Partners: Male   Other Topics Concern    Parent/sibling w/ CABG, MI or angioplasty before 65F 55M? Not Asked   Social History Narrative    12/22/2017 The patient lives with her daughter-in-law (who is present), , son, and 2 grandchildren (total of 6 people). Immigrant.     Social Determinants of Health     Financial Resource Strain: Not on file   Food Insecurity: Not on file   Transportation Needs: Not on file   Physical Activity: Not on file   Stress: Not on file   Social Connections: Not on file   Intimate Partner Violence: Not on file   Housing Stability: Not on file           Medications  Allergies   Current Outpatient Medications   Medication Sig Dispense Refill    acetaminophen (TYLENOL) 500 MG tablet Take 2 tablets (1,000 mg) by mouth every 8 hours as needed for mild pain 30 tablet 0    albuterol (PROAIR HFA/PROVENTIL HFA/VENTOLIN HFA) 108 (90 Base) MCG/ACT inhaler Inhale 2 puffs into the lungs every 4 hours as needed for shortness of breath / dyspnea 4 g 11    albuterol (PROVENTIL) (2.5 MG/3ML) 0.083% neb solution Take 1 vial (2.5 mg) by nebulization every 6 hours as needed 90 mL 11    ALLERGY RELIEF 10 MG tablet TAKE 1 TABLET (10 MG TOTAL) BY MOUTH DAILY FOR ALLERGIES 30 tablet 3    amitriptyline (ELAVIL) 10 MG tablet TAKE 3 TABLETS (30 MG TOTAL) BY MOUTH AT BEDTIME. 270 tablet 0    ASPIRIN LOW DOSE 81 MG EC tablet TAKE 1 TABLET (81 MG TOTAL) BY MOUTH DAILY. 90 tablet 3    atorvastatin (LIPITOR) 80 MG tablet TAKE 1 TABLET (80 MG TOTAL) BY MOUTH AT BEDTIME FOR CHOLESTEROL 30 tablet 3    B-D U/F 31G X 8 MM insulin pen needle USE ONE PEN NEEDLE DAILY AS NEEDED FOR  each 1    calcium carbonate (TUMS) 500 MG chewable tablet Take 1 tablet (500 mg) by mouth 2 times daily as needed for heartburn 30 tablet 0    Continuous Blood Gluc Sensor (FREESTYLE MONICA 2 SENSOR) [facility] 1 each every 14 days Use 1 sensor every 14 days. Use to read blood sugars per 's instructions. 2 each 11     CONTOUR NEXT TEST test strip USE 1 EACH AS DIRECTED 3 (THREE) TIMES A DAY. 50 strip 11    dextromethorphan (DELSYM) 30 MG/5ML liquid Take 10 mLs (60 mg) by mouth 2 times daily as needed for cough 178 mL 0    diclofenac (VOLTAREN) 1 % topical gel Apply 4 g topically 4 times daily 350 g 3    docusate sodium (COLACE) 100 MG capsule Take 1 capsule (100 mg) by mouth 2 times daily as needed for constipation 90 capsule 3    dupilumab (DUPIXENT) 300 MG/2ML prefilled pen Inject 2 mLs (300 mg) Subcutaneous every 14 days 4 mL 5    FLUoxetine (PROZAC) 10 MG capsule TAKE 1 CAPSULE (10 MG) BY MOUTH DAILY 30 capsule 1    fluticasone-vilanterol (BREO ELLIPTA) 200-25 MCG/ACT inhaler Inhale 1 puff into the lungs daily 60 each 11    gabapentin (NEURONTIN) 300 MG capsule Take 2 capsules (600 mg) by mouth 3 times daily 540 capsule 3    Global Inject Ease Lancets 30G MISC USE 1 EACH AS DIRECTED 3 (THREE) TIMES A DAY. DISPENSE BRAND PER PATIENT'S INSURANCE AT PHARMACY DISCRETION.(E11.9) 100 each 3    hydrochlorothiazide (MICROZIDE) 12.5 MG capsule TAKE 1 CAPSULE (12.5 MG TOTAL) BY MOUTH DAILY FOR BLOOD PRESSURE 90 capsule 3    hydrocortisone (CORTAID) 1 % external cream Apply topically 2 times daily 60 g 0    insulin aspart (NOVOLOG PEN) 100 UNIT/ML pen Inject 10 Units Subcutaneous 2 times daily (with meals) 15 mL 3    insulin glargine (LANTUS PEN) 100 UNIT/ML pen Inject 40 Units Subcutaneous every morning 45 mL 3    insulin pen needle (32G X 4 MM) 32G X 4 MM miscellaneous Use one pen needles daily or as directed. 200 each 11    ipratropium - albuterol 0.5 mg/2.5 mg/3 mL (DUONEB) 0.5-2.5 (3) MG/3ML neb solution Take 1 vial (3 mLs) by nebulization every 6 hours as needed for shortness of breath / dyspnea or wheezing 360 mL 11    lidocaine (XYLOCAINE) 5 % external ointment Apply topically 3 times daily as needed Small amount (finger tip amount) to chest tid prn pain 35.44 g 0    meclizine (ANTIVERT) 12.5 MG tablet Take 2 tablets (25 mg) by  mouth 3 times daily as needed for dizziness 90 tablet 0    metFORMIN (GLUCOPHAGE) 1000 MG tablet Take 1 tablet (1,000 mg) by mouth 2 times daily (with meals) 180 tablet 1    metoprolol tartrate (LOPRESSOR) 25 MG tablet TAKE 1 TABLET (25 MG TOTAL) BY MOUTH 2 (TWO) TIMES A DAY FOR BLOOD PRESSURE 180 tablet 3    montelukast (SINGULAIR) 10 MG tablet Take 1 tablet (10 mg) by mouth At Bedtime 30 tablet 11    nystatin (MYCOSTATIN) 128212 UNIT/ML suspension Take 5 mLs (500,000 Units) by mouth 4 times daily 473 mL 1    omeprazole (PRILOSEC) 40 MG DR capsule Take 1 capsule (40 mg) by mouth daily 90 capsule 3    Polyethylene Glycol 3350 (PEG 3350) 17 GM/SCOOP POWD MIX 17 GRAMS WITH LIQUID AND DRINK ONCE DAILY FOR CONSTIPATION 510 g 12    sucralfate (CARAFATE) 1 GM/10ML suspension Take 10 mLs (1 g) by mouth 4 times daily 414 mL 0    tiotropium (SPIRIVA RESPIMAT) 2.5 MCG/ACT inhaler Inhale 2 puffs into the lungs daily 4 g 11    polyvinyl alcohol (ARTIFICIAL TEARS) 1.4 % ophthalmic solution Place 1 drop into both eyes as needed for dry eyes (Patient not taking: Reported on 4/3/2023) 15 mL 3     No Known Allergies       Lab Results    Chemistry/lipid CBC Cardiac Enzymes/BNP/TSH/INR   Recent Labs   Lab Test 03/14/23  1140   CHOL 125   HDL 41*   LDL 41   TRIG 214*     Recent Labs   Lab Test 03/14/23  1140 02/16/23  1137 03/25/22  1200   LDL 41 45 54     Recent Labs   Lab Test 03/27/23  0800      POTASSIUM 4.4   CHLORIDE 106   CO2 26   *   BUN 5.9*   CR 0.51   GFRESTIMATED >90   FREDY 9.0     Recent Labs   Lab Test 03/27/23  0800 03/14/23  1140 02/05/23  1352   CR 0.51 0.56 0.58     Recent Labs   Lab Test 03/14/23  1140 11/30/22  1129 08/25/22  0852   A1C 7.4* 8.6* 10.2*          Recent Labs   Lab Test 03/27/23  0800   WBC 7.2   HGB 11.6*   HCT 38.1   MCV 83        Recent Labs   Lab Test 03/27/23  0800 02/05/23  1352 11/18/22  0921   HGB 11.6* 11.8 11.7    Recent Labs   Lab Test 09/06/22  0934 07/11/22  3784  07/11/22  2018   TROPONINI 0.02 <0.01 <0.01     Recent Labs   Lab Test 07/11/22  2018 07/02/22  1929 01/03/22  1628   BNP 15 12 <10     Recent Labs   Lab Test 07/14/22  1138   TSH 0.59     Recent Labs   Lab Test 09/06/22  0934 07/02/22  1929 05/27/21  1107   INR 1.02 1.03 0.98                    Thank you for allowing me to participate in the care of your patient.    Sincerely,     Alli Castrejon MD     [facility]

## 2023-04-03 NOTE — PATIENT INSTRUCTIONS
We will scheldule a lexiscan stress test to evaluate the chest pain  No change to current medicines  Return to ED for evaluation if symptoms become worse/progress

## 2023-04-03 NOTE — TELEPHONE ENCOUNTER
"Routing refill request to provider for review/approval because:  Provider review of use requested.    Last Written Prescription Date:  3/21/23  Last Fill Quantity: 90,  # refills: 0   Last office visit provider:  3/14/23       Requested Prescriptions    Disp Refills     90 tablet 0     Sig: TAKE 2 TABLETS (25 MG) BY MOUTH 3 TIMES DAILY AS NEEDED FOR DIZZINESS        Antivertigo/Antiemetic Agents Passed - 4/3/2023  9:11 AM        Passed - Recent (12 mo) or future (30 days) visit within the authorizing provider's specialty     Patient has had an office visit with the authorizing provider or a provider within the authorizing providers department within the previous 12 mos or has a future within next 30 days. See \"Patient Info\" tab in inbasket, or \"Choose Columns\" in Meds & Orders section of the refill encounter.              Passed - Medication is active on med list        Passed - Patient is 18 years of age or older             TANIA MORROW RN 04/03/23 12:03 PM  "

## 2023-04-03 NOTE — PROGRESS NOTES
"  HEART CARE ENCOUNTER NOTE      [facility]  [phone]      Assessment/Recommendations   Assessment:   1.  Atypical chest pain probably not due to progression of her underlying coronary disease or restenosis of her LAD stent.  This appeared widely patent on angiography last year.  The pain is also been more or less continuous since she was seen in the emergency department last week.  I suspect some musculoskeletal discomfort.  She does not look uncomfortable at all at this point.  Nevertheless she thinks it is her heart.      Plan:   1.  We will arrange a Lexiscan myocardial perfusion imaging study to rule out any evidence of inducible ischemia if this looks normal no further cardiac evaluation until she sees Dr. Hirsch for routine follow-up next year.           History of Present Illness/Subjective    HPI: Kulwant Amin is a 53 year old  female with ischemic heart disease followed by my colleague Dr. Hirsch he just saw her a month ago.  Last year she had a large LAD stent placed for symptoms of exertional angina.  She began to have some recurrent discomfort a  week ago on 3/27/2023.  Her serial troponins were negative.  ECG was unrevealing.  The discomfort seemed to respond to a GI cocktail per Dr. Garza.  She was referred in for follow-up of that ER visit.  The pain is not exertional rather it comes and goes throughout the day without any provoking activity.  She is unable to make it better with any activity such as eating or drinking water.  It does seem improved with antacids.    Studies reviewed:  ECG: 3/27/2023 personally reviewed; sinus rhythm, no acute ischemic changes.  Troponin less than 0.06         Physical Examination  Review of Systems   /70 (BP Location: Right arm, Patient Position: Sitting, Cuff Size: Adult Regular)   Pulse 94   Resp 24   Ht 1.676 m (5' 6\")   Wt 56.7 kg (125 lb)   BMI 20.18 kg/m    Body mass index is 20.18 kg/m .  Wt Readings from Last 3 " Encounters:   04/03/23 56.7 kg (125 lb)   03/21/23 57 kg (125 lb 12 oz)   03/14/23 57.7 kg (127 lb 4 oz)       General Appearance:   Pleasant elderly female appears stated age. no acute distress, normal body habitus   ENT/Mouth: Oropharynx normal    EYES:  no scleral icterus, normal conjunctivae. No eyelid xanthelasma   Neck: No cervical lymphadenopathy  Thyroid not enlarged or nodular  No JVD   Respiratory:   lungs clear , no rales or wheezing, Normal chest wall expansion    Cardiovascular:   Regular rhythm, normal rate. Normal 1st and 2nd heart sounds.  No murmurs, no rubs or gallops;   radial pulses are full and equal   Jugular venous pressure is nonelevated   Abdomen/GI:  No tenderness, no organomegaly, no pulsatile masses. NBS.   Extremities: No cyanosis or clubbing.  No peripheral edema   Skin: No rash or lesions   Heme/lymph/ Immunology No lymphadenopathy, petechiae   Neurologic: Alert and oriented. No focal motor weakness, gait appears normal.       Psychiatric: Pleasant, calm, appropriate affect.          No known hepatic, renal, pulmonary, or CNS disorders. The remainder of his complete ROS is negative except as noted above.         Medical History  Surgical History Family History Social History   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     Microcytic anemia 11/17/2019     S/P coronary artery stent placement 02/02/2018     TB lung, latent     9 mos INH     Past Surgical History:    Procedure Laterality Date     CORONARY STENT PLACEMENT       CV CORONARY ANGIOGRAM N/A 2018    Procedure: Coronary Angiogram;  Surgeon: Angelo Serrano MD;  Location: [facility] Cath Lab;  Service:      CV CORONARY ANGIOGRAM N/A 2022    Procedure: Coronary Angiogram;  Surgeon: Irwin Cano MD;  Location: [facility] CATH LAB CV     CV CORONARY ANGIOGRAM  2022    Procedure: ;  Surgeon: Irwin Cano MD;  Location: [facility] CATH LAB CV     [place] ESOPHAGOGASTRODUODENOSCOPY TRANSORAL DIAGNOSTIC N/A 12/10/2018    Procedure: ESOPHAGOGASTRODUODENOSCOPY (EGD);  Surgeon: Eddie Renteria MD;  Location: [facility];  Service: Gastroenterology     [place] ESOPHAGOGASTRODUODENOSCOPY TRANSORAL DIAGNOSTIC N/A 12/3/2020    Procedure: ESOPHAGOGASTRODUODENOSCOPY (EGD) with biospies ;  Surgeon: Avi Crow MD;  Location: [facility];  Service: Gastroenterology     [facility] COLONOSCOPY W/WO BRUSH/WASH N/A 12/10/2018    Procedure: COLONOSCOPY with polypectomy using biopsy forceps;  Surgeon: Eddie Renteria MD;  Location: [facility];  Service: Gastroenterology     Family History   Problem Relation Age of Onset     Other - See Comments Mother          of an intestinal problem     Ulcers Father          of gastritis     Breast Cancer No family hx of         Social History     Socioeconomic History     Marital status:      Spouse name: Not on file     Number of children: Not on file     Years of education: Not on file     Highest education level: Not on file   Occupational History     Not on file   Tobacco Use     Smoking status: Never     Smokeless tobacco: Never     Tobacco comments:     No passive exposure   Vaping Use     Vaping status: Not on file   Substance and Sexual Activity     Alcohol use: No     Drug use: No     Sexual activity: Yes     Partners: Male   Other Topics Concern     Parent/sibling w/ CABG, MI or angioplasty before 65F 55M? Not Asked   Social History Narrative     12/22/2017 The patient lives with her daughter-in-law (who is present), , son, and 2 grandchildren (total of 6 people). Imm[place].     Social Determinants of Health     Financial Resource Strain: Not on file   Food Insecurity: Not on file   Transportation Needs: Not on file   Physical Activity: Not on file   Stress: Not on file   Social Connections: Not on file   Intimate Partner Violence: Not on file   Housing Stability: Not on file           Medications  Allergies   Current Outpatient Medications   Medication Sig Dispense Refill     acetaminophen (TYLENOL) 500 MG tablet Take 2 tablets (1,000 mg) by mouth every 8 hours as needed for mild pain 30 tablet 0     albuterol (PROAIR HFA/PROVENTIL HFA/VENTOLIN HFA) 108 (90 Base) MCG/ACT inhaler Inhale 2 puffs into the lungs every 4 hours as needed for shortness of breath / dyspnea 4 g 11     albuterol (PROVENTIL) (2.5 MG/3ML) 0.083% neb solution Take 1 vial (2.5 mg) by nebulization every 6 hours as needed 90 mL 11     ALLERGY RELIEF 10 MG tablet TAKE 1 TABLET (10 MG TOTAL) BY MOUTH DAILY FOR ALLERGIES 30 tablet 3     amitriptyline (ELAVIL) 10 MG tablet TAKE 3 TABLETS (30 MG TOTAL) BY MOUTH AT BEDTIME. 270 tablet 0     ASPIRIN LOW DOSE 81 MG EC tablet TAKE 1 TABLET (81 MG TOTAL) BY MOUTH DAILY. 90 tablet 3     atorvastatin (LIPITOR) 80 MG tablet TAKE 1 TABLET (80 MG TOTAL) BY MOUTH AT BEDTIME FOR CHOLESTEROL 30 tablet 3     B-D U/F 31G X 8 MM insulin pen needle USE ONE PEN NEEDLE DAILY AS NEEDED FOR  each 1     calcium carbonate (TUMS) 500 MG chewable tablet Take 1 tablet (500 mg) by mouth 2 times daily as needed for heartburn 30 tablet 0     Continuous Blood Gluc Sensor (FREESTYLE MONICA 2 SENSOR) MISC 1 each every 14 days Use 1 sensor every 14 days. Use to read blood sugars per 's instructions. 2 each 11     CONTOUR NEXT TEST test strip USE 1 EACH AS DIRECTED 3 (THREE) TIMES A DAY. 50 strip 11     dextromethorphan (DELSYM) 30 MG/5ML liquid Take 10  mLs (60 mg) by mouth 2 times daily as needed for cough 178 mL 0     diclofenac (VOLTAREN) 1 % topical gel Apply 4 g topically 4 times daily 350 g 3     docusate sodium (COLACE) 100 MG capsule Take 1 capsule (100 mg) by mouth 2 times daily as needed for constipation 90 capsule 3     dupilumab (DUPIXENT) 300 MG/2ML prefilled pen Inject 2 mLs (300 mg) Subcutaneous every 14 days 4 mL 5     FLUoxetine (PROZAC) 10 MG capsule TAKE 1 CAPSULE (10 MG) BY MOUTH DAILY 30 capsule 1     fluticasone-vilanterol (BREO ELLIPTA) 200-25 MCG/ACT inhaler Inhale 1 puff into the lungs daily 60 each 11     gabapentin (NEURONTIN) 300 MG capsule Take 2 capsules (600 mg) by mouth 3 times daily 540 capsule 3     Global Inject Ease Lancets 30G MISC USE 1 EACH AS DIRECTED 3 (THREE) TIMES A DAY. DISPENSE BRAND PER PATIENT'S INSURANCE AT PHARMACY DISCRETION.(E11.9) 100 each 3     hydrochlorothiazide (MICROZIDE) 12.5 MG capsule TAKE 1 CAPSULE (12.5 MG TOTAL) BY MOUTH DAILY FOR BLOOD PRESSURE 90 capsule 3     hydrocortisone (CORTAID) 1 % external cream Apply topically 2 times daily 60 g 0     insulin aspart (NOVOLOG PEN) 100 UNIT/ML pen Inject 10 Units Subcutaneous 2 times daily (with meals) 15 mL 3     insulin glargine (LANTUS PEN) 100 UNIT/ML pen Inject 40 Units Subcutaneous every morning 45 mL 3     insulin pen needle (32G X 4 MM) 32G X 4 MM miscellaneous Use one pen needles daily or as directed. 200 each 11     ipratropium - albuterol 0.5 mg/2.5 mg/3 mL (DUONEB) 0.5-2.5 (3) MG/3ML neb solution Take 1 vial (3 mLs) by nebulization every 6 hours as needed for shortness of breath / dyspnea or wheezing 360 mL 11     lidocaine (XYLOCAINE) 5 % external ointment Apply topically 3 times daily as needed Small amount (finger tip amount) to chest tid prn pain 35.44 g 0     meclizine (ANTIVERT) 12.5 MG tablet Take 2 tablets (25 mg) by mouth 3 times daily as needed for dizziness 90 tablet 0     metFORMIN (GLUCOPHAGE) 1000 MG tablet Take 1 tablet (1,000 mg) by  mouth 2 times daily (with meals) 180 tablet 1     metoprolol tartrate (LOPRESSOR) 25 MG tablet TAKE 1 TABLET (25 MG TOTAL) BY MOUTH 2 (TWO) TIMES A DAY FOR BLOOD PRESSURE 180 tablet 3     montelukast (SINGULAIR) 10 MG tablet Take 1 tablet (10 mg) by mouth At Bedtime 30 tablet 11     nystatin (MYCOSTATIN) 248007 UNIT/ML suspension Take 5 mLs (500,000 Units) by mouth 4 times daily 473 mL 1     omeprazole (PRILOSEC) 40 MG DR capsule Take 1 capsule (40 mg) by mouth daily 90 capsule 3     Polyethylene Glycol 3350 (PEG 3350) 17 GM/SCOOP POWD MIX 17 GRAMS WITH LIQUID AND DRINK ONCE DAILY FOR CONSTIPATION 510 g 12     sucralfate (CARAFATE) 1 GM/10ML suspension Take 10 mLs (1 g) by mouth 4 times daily 414 mL 0     tiotropium (SPIRIVA RESPIMAT) 2.5 MCG/ACT inhaler Inhale 2 puffs into the lungs daily 4 g 11     polyvinyl alcohol (ARTIFICIAL TEARS) 1.4 % ophthalmic solution Place 1 drop into both eyes as needed for dry eyes (Patient not taking: Reported on 4/3/2023) 15 mL 3     No Known Allergies       Lab Results    Chemistry/lipid CBC Cardiac Enzymes/BNP/TSH/INR   Recent Labs   Lab Test 03/14/23  1140   CHOL 125   HDL 41*   LDL 41   TRIG 214*     Recent Labs   Lab Test 03/14/23  1140 02/16/23  1137 03/25/22  1200   LDL 41 45 54     Recent Labs   Lab Test 03/27/23  0800      POTASSIUM 4.4   CHLORIDE 106   CO2 26   *   BUN 5.9*   CR 0.51   GFRESTIMATED >90   FREDY 9.0     Recent Labs   Lab Test 03/27/23  0800 03/14/23  1140 02/05/23  1352   CR 0.51 0.56 0.58     Recent Labs   Lab Test 03/14/23  1140 11/30/22  1129 08/25/22  0852   A1C 7.4* 8.6* 10.2*          Recent Labs   Lab Test 03/27/23  0800   WBC 7.2   HGB 11.6*   HCT 38.1   MCV 83        Recent Labs   Lab Test 03/27/23  0800 02/05/23  1352 11/18/22  0921   HGB 11.6* 11.8 11.7    Recent Labs   Lab Test 09/06/22  0934 07/11/22  2340 07/11/22 2018   TROPONINI 0.02 <0.01 <0.01     Recent Labs   Lab Test 07/11/22  2018 07/02/22  1929 01/03/22  1628   BNP 15  12 <10     Recent Labs   Lab Test 07/14/22  1138   TSH 0.59     Recent Labs   Lab Test 09/06/22  0934 07/02/22  1929 05/27/21  1107   INR 1.02 1.03 0.98        Alli Castrejon MD

## 2023-04-04 ENCOUNTER — OFFICE VISIT (OUTPATIENT)
Dept: PULMONOLOGY | Facility: CLINIC | Age: 53
End: 2023-04-04
Payer: COMMERCIAL

## 2023-04-04 ENCOUNTER — TELEPHONE (OUTPATIENT)
Dept: PULMONOLOGY | Facility: CLINIC | Age: 53
End: 2023-04-04

## 2023-04-04 VITALS
OXYGEN SATURATION: 98 % | DIASTOLIC BLOOD PRESSURE: 60 MMHG | WEIGHT: 126.8 LBS | BODY MASS INDEX: 20.47 KG/M2 | HEART RATE: 73 BPM | SYSTOLIC BLOOD PRESSURE: 126 MMHG

## 2023-04-04 DIAGNOSIS — D72.10 EOSINOPHILIA, UNSPECIFIED TYPE: ICD-10-CM

## 2023-04-04 DIAGNOSIS — J45.50 SEVERE PERSISTENT ASTHMA WITHOUT COMPLICATION (H): Primary | ICD-10-CM

## 2023-04-04 DIAGNOSIS — K21.9 GASTROESOPHAGEAL REFLUX DISEASE, UNSPECIFIED WHETHER ESOPHAGITIS PRESENT: ICD-10-CM

## 2023-04-04 PROCEDURE — 99214 OFFICE O/P EST MOD 30 MIN: CPT | Performed by: NURSE PRACTITIONER

## 2023-04-04 RX ORDER — MECLIZINE HCL 12.5 MG 12.5 MG/1
25 TABLET ORAL 3 TIMES DAILY PRN
Qty: 90 TABLET | Refills: 0 | Status: SHIPPED | OUTPATIENT
Start: 2023-04-04 | End: 2024-03-25

## 2023-04-04 RX ORDER — PREDNISONE 20 MG/1
40 TABLET ORAL DAILY
Qty: 10 TABLET | Refills: 0 | Status: SHIPPED | OUTPATIENT
Start: 2023-04-04 | End: 2023-04-09

## 2023-04-04 ASSESSMENT — ASTHMA QUESTIONNAIRES
QUESTION_1 LAST FOUR WEEKS HOW MUCH OF THE TIME DID YOUR ASTHMA KEEP YOU FROM GETTING AS MUCH DONE AT WORK, SCHOOL OR AT HOME: ALL OF THE TIME
QUESTION_5 LAST FOUR WEEKS HOW WOULD YOU RATE YOUR ASTHMA CONTROL: POORLY CONTROLLED
EMERGENCY_ROOM_LAST_YEAR_TOTAL: ONE
QUESTION_2 LAST FOUR WEEKS HOW OFTEN HAVE YOU HAD SHORTNESS OF BREATH: MORE THAN ONCE A DAY
ACT_TOTALSCORE: 8
QUESTION_4 LAST FOUR WEEKS HOW OFTEN HAVE YOU USED YOUR RESCUE INHALER OR NEBULIZER MEDICATION (SUCH AS ALBUTEROL): ONE OR TWO TIMES PER DAY
ACT_TOTALSCORE: 8
QUESTION_3 LAST FOUR WEEKS HOW OFTEN DID YOUR ASTHMA SYMPTOMS (WHEEZING, COUGHING, SHORTNESS OF BREATH, CHEST TIGHTNESS OR PAIN) WAKE YOU UP AT NIGHT OR EARLIER THAN USUAL IN THE MORNING: TWO OR THREE NIGHTS A WEEK

## 2023-04-04 NOTE — TELEPHONE ENCOUNTER
Called and spoke with Billy in regards to having Kulwant follow up with [facility]. She will reach out to them to schedule.

## 2023-04-04 NOTE — PATIENT INSTRUCTIONS
- we will have you meet with the stomach doctor to see if your eosinophilia is affecting your stomach and esophagus. They should call you in the next few days to set this up.     If you have worsening symptoms, questions, or need to speak with the nurse please call [phone].

## 2023-04-04 NOTE — TELEPHONE ENCOUNTER
----- Message from KACEY Bonilla CNP sent at 4/4/2023  1:41 PM CDT -----  I put in a referral for GI and got a message she is already being seen at [facility]. Can you call her daughter in law and let her know she needs to call and schedule with them?    Thanks!

## 2023-04-04 NOTE — PROGRESS NOTES
Pulmonary Outpatient Clinic Follow Up  April 4, 2023      Assessment/Plan:    53 year old woman with history of organic fuel exposure in the home was previously had nondiagnostic PFTs.  She was previously evaluated by Dr. Marie and initiated on Dupixent for her hypereosinophilia.  She is feeling OK today and is not reporting active exacerbation symptoms. Recent ED visit for chest pain, good response to GI cocktail and planned f/u NM stress test.     Continue Breo Ellipta 1 puff daily.  She knows to gargle after using this.     Continue Spiriva.    Continue to use 3 times daily albuterol nebs.    Continue Singulair daily     Continue omeprazole 40 mg daily.    As needed albuterol for rescue.    Has received both doses of her Covid-19 vaccine, UTD on booster    Has had seasonal flu vaccine    Continue Tums as needed for gastric upset.     Continue Dupixent through Dr. Marie    Suggested follow up with GI in light of continued reflux on omeprazole and good response to GI cocktail in ED. She is being seen at [facility] per our [place] scheduling team.       Follow-up in person in 2 months    Jenn Mike CNP  Pulmonary Medicine  [facility]   [phone]      Subjective:   Patient ID: Ms. Amin is a 53 year old female with a past medical history significant for anxiety, arthritis, questionable asthma versus COPD, diabetes, hypertension and a prior history of SVT presents with her daughter in law for a follow-up visit for her pulmonary complaints.  She follows fairly closely for her moderate-persistent, difficult to control asthma symptoms.   Recent ED visit for chest pain, history of LAD stent placed for symptoms of exertional angina. She began to have some chest recurrent discomfort on 3/27/2023. She was seen in the ED, serial troponins were negative, ECG was unrevealing. Per ED note the discomfort seemed to respond to a GI cocktail. NM stress test and follow up with cardiology scheduled.     Since last clinic visit,  she has not had another exacerbation and reports baseline symptoms of cough and wheeze.  She continues to be compliant with her Breo and Incruse Ellipta but notes that Breo is no longer covered by her insurance and that she will have to switch this, [place]. She also continues to be compliant with her Dupixent injections at home.           10/12/2022     9:00 AM 11/14/2022     8:00 AM 4/4/2023     1:00 PM   ACT Total Scores   ACT TOTAL SCORE (Goal Greater than or Equal to 20) 7 10 8   In the past 12 months, how many times did you visit the emergency room for your asthma without being admitted to the hospital? 3 3 1   In the past 12 months, how many times were you hospitalized overnight because of your asthma? 3 3 0       Current Outpatient Medications   Medication Sig Dispense Refill     acetaminophen (TYLENOL) 500 MG tablet Take 2 tablets (1,000 mg) by mouth every 8 hours as needed for mild pain 30 tablet 0     albuterol (PROAIR HFA/PROVENTIL HFA/VENTOLIN HFA) 108 (90 Base) MCG/ACT inhaler Inhale 2 puffs into the lungs every 4 hours as needed for shortness of breath / dyspnea 4 g 11     albuterol (PROVENTIL) (2.5 MG/3ML) 0.083% neb solution Take 1 vial (2.5 mg) by nebulization every 6 hours as needed 90 mL 11     ALLERGY RELIEF 10 MG tablet TAKE 1 TABLET (10 MG TOTAL) BY MOUTH DAILY FOR ALLERGIES 30 tablet 3     amitriptyline (ELAVIL) 10 MG tablet TAKE 3 TABLETS (30 MG TOTAL) BY MOUTH AT BEDTIME. 270 tablet 0     ASPIRIN LOW DOSE 81 MG EC tablet TAKE 1 TABLET (81 MG TOTAL) BY MOUTH DAILY. 90 tablet 3     atorvastatin (LIPITOR) 80 MG tablet TAKE 1 TABLET (80 MG TOTAL) BY MOUTH AT BEDTIME FOR CHOLESTEROL 30 tablet 3     B-D U/F 31G X 8 MM insulin pen needle USE ONE PEN NEEDLE DAILY AS NEEDED FOR  each 1     calcium carbonate (TUMS) 500 MG chewable tablet Take 1 tablet (500 mg) by mouth 2 times daily as needed for heartburn 30 tablet 0     Continuous Blood Gluc Sensor (FREESTYLE MONICA 2 SENSOR) MISC 1 each  every 14 days Use 1 sensor every 14 days. Use to read blood sugars per 's instructions. 2 each 11     CONTOUR NEXT TEST test strip USE 1 EACH AS DIRECTED 3 (THREE) TIMES A DAY. 50 strip 11     dextromethorphan (DELSYM) 30 MG/5ML liquid Take 10 mLs (60 mg) by mouth 2 times daily as needed for cough 178 mL 0     diclofenac (VOLTAREN) 1 % topical gel Apply 4 g topically 4 times daily 350 g 3     docusate sodium (COLACE) 100 MG capsule Take 1 capsule (100 mg) by mouth 2 times daily as needed for constipation 90 capsule 3     dupilumab (DUPIXENT) 300 MG/2ML prefilled pen Inject 2 mLs (300 mg) Subcutaneous every 14 days 4 mL 5     FLUoxetine (PROZAC) 10 MG capsule TAKE 1 CAPSULE (10 MG) BY MOUTH DAILY 30 capsule 1     fluticasone-vilanterol (BREO ELLIPTA) 200-25 MCG/ACT inhaler Inhale 1 puff into the lungs daily 60 each 11     gabapentin (NEURONTIN) 300 MG capsule Take 2 capsules (600 mg) by mouth 3 times daily 540 capsule 3     Global Inject Ease Lancets 30G MISC USE 1 EACH AS DIRECTED 3 (THREE) TIMES A DAY. DISPENSE BRAND PER PATIENT'S INSURANCE AT PHARMACY DISCRETION.(E11.9) 100 each 3     hydrochlorothiazide (MICROZIDE) 12.5 MG capsule TAKE 1 CAPSULE (12.5 MG TOTAL) BY MOUTH DAILY FOR BLOOD PRESSURE 90 capsule 3     hydrocortisone (CORTAID) 1 % external cream Apply topically 2 times daily 60 g 0     insulin aspart (NOVOLOG PEN) 100 UNIT/ML pen Inject 10 Units Subcutaneous 2 times daily (with meals) 15 mL 3     insulin glargine (LANTUS PEN) 100 UNIT/ML pen Inject 40 Units Subcutaneous every morning 45 mL 3     insulin pen needle (32G X 4 MM) 32G X 4 MM miscellaneous Use one pen needles daily or as directed. 200 each 11     ipratropium - albuterol 0.5 mg/2.5 mg/3 mL (DUONEB) 0.5-2.5 (3) MG/3ML neb solution Take 1 vial (3 mLs) by nebulization every 6 hours as needed for shortness of breath / dyspnea or wheezing 360 mL 11     lidocaine (XYLOCAINE) 5 % external ointment Apply topically 3 times daily as needed  Small amount (finger tip amount) to chest tid prn pain 35.44 g 0     meclizine (ANTIVERT) 12.5 MG tablet TAKE 2 TABLETS (25 MG) BY MOUTH 3 TIMES DAILY AS NEEDED FOR DIZZINESS 90 tablet 0     metFORMIN (GLUCOPHAGE) 1000 MG tablet Take 1 tablet (1,000 mg) by mouth 2 times daily (with meals) 180 tablet 1     metoprolol tartrate (LOPRESSOR) 25 MG tablet TAKE 1 TABLET (25 MG TOTAL) BY MOUTH 2 (TWO) TIMES A DAY FOR BLOOD PRESSURE 180 tablet 3     montelukast (SINGULAIR) 10 MG tablet Take 1 tablet (10 mg) by mouth At Bedtime 30 tablet 11     nystatin (MYCOSTATIN) 681410 UNIT/ML suspension Take 5 mLs (500,000 Units) by mouth 4 times daily 473 mL 1     omeprazole (PRILOSEC) 40 MG DR capsule Take 1 capsule (40 mg) by mouth daily 90 capsule 3     Polyethylene Glycol 3350 (PEG 3350) 17 GM/SCOOP POWD MIX 17 GRAMS WITH LIQUID AND DRINK ONCE DAILY FOR CONSTIPATION 510 g 12     predniSONE (DELTASONE) 20 MG tablet Take 2 tablets (40 mg) by mouth daily for 5 days 10 tablet 0     sucralfate (CARAFATE) 1 GM/10ML suspension Take 10 mLs (1 g) by mouth 4 times daily 414 mL 0     tiotropium (SPIRIVA RESPIMAT) 2.5 MCG/ACT inhaler Inhale 2 puffs into the lungs daily 4 g 11     polyvinyl alcohol (ARTIFICIAL TEARS) 1.4 % ophthalmic solution Place 1 drop into both eyes as needed for dry eyes (Patient not taking: Reported on 4/3/2023) 15 mL 3     Social history:  Lives in a house built in 1963; no concern for mold in the house.  7 People live in the house including 2 children.  There are no pets in the house.  She is a never smoker and there is no second hand exposure to smoke.  She does not drink alcoholic beverages and denies indulging in recreational drugs.    Physical Exam   /60 (BP Location: Right arm)   Pulse 73   Wt 57.5 kg (126 lb 12.8 oz)   SpO2 98%   BMI 20.47 kg/m      Physical Exam  Constitutional:       General: She is not in acute distress.     Appearance: She is not ill-appearing or diaphoretic.   Cardiovascular:       Rate and Rhythm: Normal rate and regular rhythm.      Pulses: Normal pulses.      Heart sounds: Normal heart sounds.   Pulmonary:      Effort: Pulmonary effort is normal. No respiratory distress.      Breath sounds: No wheezing or rhonchi.   Musculoskeletal:      Right lower leg: No edema.      Left lower leg: No edema.   Skin:     General: Skin is warm and dry.      Findings: No rash.   Neurological:      Mental Status: She is alert.   Psychiatric:         Behavior: Behavior normal.            Latest Reference Range & Units 02/07/22 15:47   Neutrophil Cytoplasmic Antibody <1:10  <1:10   Neutrophil Cytoplasmic Antibody Pattern  The ANCA IFA is <1:10.  No further testing will be performed.      Latest Reference Range & Units 02/07/22 15:47   Schistosoma Ab IgG Negative  Negative [1]   Strongyloides Bere IgG <=0.9 IV 0.4 [2]      Latest Reference Range & Units 02/07/22 15:47   Immunoglobulin E <=214 kU/L 74 [1]   Allergen Fungimold A Fumigatus IgE <=0.34 kU/L <0.10 [2]   Aspergillus Fumagatis 1 Antibody None Detected  None Detected [3]   Aspergillus Fumagatis 6 Antibody None Detected  None Detected [4]   Allergen, Interp, Immunocap Score IgE  See Note [5]     XR CHEST 2 VIEWS, DATE/TIME: 3/27/2023 8:32 AM  INDICATION: chest pain  COMPARISON: 02/05/2023                                                              IMPRESSION: No pneumothorax or pleural effusion. No focal consolidation. Cardiac silhouette within normal limits. Mildly atherosclerotic aorta.    CT CHEST W/O CONTRAST, DATE/TIME: 7/11/2022 11:28 PM  INDICATION: Chest pain.  COMPARISON: 5/4/2022.  TECHNIQUE: CT chest without IV contrast. Multiplanar reformats were obtained. Dose reduction techniques were used.  CONTRAST: None.  FINDINGS:   LUNGS AND PLEURA: There is atelectasis and scarring at the lung bases. Scarring at the lung apices. Mild dependent atelectasis bilaterally. Subpleural calcified granuloma or calcified plaque in the right lower lobe  laterally. Stable 2 mm nodule in the   right upper lobe posteriorly. Stable 3 mm nodule in the lingula laterally. Tiny nodule along the left major fissure laterally is stable. No pneumothorax or pleural effusion.  MEDIASTINUM/AXILLAE: No lymph node enlargement. Central airways are unremarkable. The heart size is normal.  CORONARY ARTERY CALCIFICATION: Previous intervention (stents or CABG).                                                             IMPRESSION:   1.  No acute abnormality. No cause of chest pain is evident.  2.  A few small lung nodules without significant change.  Recommendations for one or multiple incidental lung nodules < 6mm :    Low risk patients: No routine follow-up.    High risk patients: Optional follow-up CT at 12 months; if unchanged, no further follow-up.    ECHO 5/4/2022  Interpretation Summary  Left ventricular size, wall motion and function are normal. The ejection  fraction is 55-60%.  There is borderline anterior, septal, and apical wall hypokinesis.  Normal right ventricle size and systolic function.  No hemodynamically significant valvular abnormalities on 2D or color flow  imaging.

## 2023-04-17 ENCOUNTER — HOSPITAL ENCOUNTER (OUTPATIENT)
Dept: NUCLEAR MEDICINE | Facility: HOSPITAL | Age: 53
Discharge: HOME | End: 2023-04-17
Attending: INTERNAL MEDICINE
Payer: COMMERCIAL

## 2023-04-17 ENCOUNTER — HOSPITAL ENCOUNTER (OUTPATIENT)
Dept: CARDIOLOGY | Facility: HOSPITAL | Age: 53
Discharge: HOME | End: 2023-04-17
Attending: INTERNAL MEDICINE
Payer: COMMERCIAL

## 2023-04-17 DIAGNOSIS — R07.9 CHEST PAIN, UNSPECIFIED TYPE: ICD-10-CM

## 2023-04-17 LAB
CV STRESS CURRENT BP HE: NORMAL
CV STRESS CURRENT HR HE: 100
CV STRESS CURRENT HR HE: 100
CV STRESS CURRENT HR HE: 109
CV STRESS CURRENT HR HE: 113
CV STRESS CURRENT HR HE: 117
CV STRESS CURRENT HR HE: 121
CV STRESS CURRENT HR HE: 125
CV STRESS CURRENT HR HE: 125
CV STRESS CURRENT HR HE: 138
CV STRESS CURRENT HR HE: 85
CV STRESS CURRENT HR HE: 91
CV STRESS CURRENT HR HE: 92
CV STRESS CURRENT HR HE: 94
CV STRESS CURRENT HR HE: 99
CV STRESS DEVIATION TIME HE: NORMAL
CV STRESS ECHO PERCENT HR HE: NORMAL
CV STRESS EXERCISE STAGE HE: NORMAL
CV STRESS FINAL RESTING BP HE: NORMAL
CV STRESS FINAL RESTING HR HE: 92
CV STRESS MAX HR HE: 138
CV STRESS MAX TREADMILL GRADE HE: 0
CV STRESS MAX TREADMILL SPEED HE: 0
CV STRESS PEAK DIA BP HE: NORMAL
CV STRESS PEAK SYS BP HE: NORMAL
CV STRESS PHASE HE: NORMAL
CV STRESS PROTOCOL HE: NORMAL
CV STRESS RESTING PT POSITION HE: NORMAL
CV STRESS ST DEVIATION AMOUNT HE: NORMAL
CV STRESS ST DEVIATION ELEVATION HE: NORMAL
CV STRESS ST EVELATION AMOUNT HE: NORMAL
CV STRESS TEST TYPE HE: NORMAL
CV STRESS TOTAL STAGE TIME MIN 1 HE: NORMAL
RATE PRESSURE PRODUCT: NORMAL
STRESS ECHO BASELINE DIASTOLIC HE: 79
STRESS ECHO BASELINE HR: 83
STRESS ECHO BASELINE SYSTOLIC BP: 147
STRESS ECHO CALCULATED PERCENT HR: 84 %
STRESS ECHO LAST STRESS DIASTOLIC BP: 98
STRESS ECHO LAST STRESS HR: 125
STRESS ECHO LAST STRESS SYSTOLIC BP: 160
STRESS ECHO TARGET HR: 165
STRESS/REST PERFUSION RATIO: 1.38

## 2023-04-17 PROCEDURE — 343N000001 HC RX 343: Performed by: INTERNAL MEDICINE

## 2023-04-17 PROCEDURE — 78452 HT MUSCLE IMAGE SPECT MULT: CPT

## 2023-04-17 PROCEDURE — 93016 CV STRESS TEST SUPVJ ONLY: CPT | Performed by: INTERNAL MEDICINE

## 2023-04-17 PROCEDURE — A9500 TC99M SESTAMIBI: HCPCS | Performed by: INTERNAL MEDICINE

## 2023-04-17 PROCEDURE — 78452 HT MUSCLE IMAGE SPECT MULT: CPT | Mod: 26 | Performed by: INTERNAL MEDICINE

## 2023-04-17 PROCEDURE — 93018 CV STRESS TEST I&R ONLY: CPT | Performed by: INTERNAL MEDICINE

## 2023-04-17 PROCEDURE — 93017 CV STRESS TEST TRACING ONLY: CPT

## 2023-04-17 PROCEDURE — 250N000011 HC RX IP 250 OP 636: Performed by: INTERNAL MEDICINE

## 2023-04-17 RX ORDER — ALBUTEROL SULFATE 0.83 MG/ML
2.5 SOLUTION RESPIRATORY (INHALATION)
Status: DISCONTINUED | OUTPATIENT
Start: 2023-04-17 | End: 2023-04-17

## 2023-04-17 RX ORDER — AMINOPHYLLINE 25 MG/ML
50 INJECTION, SOLUTION INTRAVENOUS
Status: DISCONTINUED | OUTPATIENT
Start: 2023-04-17 | End: 2023-04-17 | Stop reason: HOSPADM

## 2023-04-17 RX ORDER — REGADENOSON 0.08 MG/ML
0.4 INJECTION, SOLUTION INTRAVENOUS ONCE
Status: COMPLETED | OUTPATIENT
Start: 2023-04-17 | End: 2023-04-17

## 2023-04-17 RX ORDER — CAFFEINE CITRATE 20 MG/ML
60 SOLUTION INTRAVENOUS
Status: DISCONTINUED | OUTPATIENT
Start: 2023-04-17 | End: 2023-04-17 | Stop reason: HOSPADM

## 2023-04-17 RX ORDER — CAFFEINE 200 MG
200 TABLET ORAL
Status: DISCONTINUED | OUTPATIENT
Start: 2023-04-17 | End: 2023-04-17 | Stop reason: HOSPADM

## 2023-04-17 RX ORDER — LEVALBUTEROL INHALATION SOLUTION 1.25 MG/3ML
1.25 SOLUTION RESPIRATORY (INHALATION)
Status: DISCONTINUED | OUTPATIENT
Start: 2023-04-17 | End: 2023-04-18 | Stop reason: HOSPADM

## 2023-04-17 RX ADMIN — REGADENOSON 0.4 MG: 0.08 INJECTION, SOLUTION INTRAVENOUS at 10:08

## 2023-04-17 RX ADMIN — AMINOPHYLLINE 50 MG: 25 INJECTION, SOLUTION INTRAVENOUS at 10:15

## 2023-04-17 RX ADMIN — Medication 32.9 MILLICURIE: at 10:10

## 2023-04-17 RX ADMIN — Medication 8.35 MILLICURIE: at 09:05

## 2023-04-27 ENCOUNTER — TELEPHONE (OUTPATIENT)
Dept: PULMONOLOGY | Facility: CLINIC | Age: 53
End: 2023-04-27
Payer: COMMERCIAL

## 2023-04-27 DIAGNOSIS — J45.40 MODERATE PERSISTENT ASTHMA WITHOUT COMPLICATION: Primary | Chronic | ICD-10-CM

## 2023-04-27 DIAGNOSIS — Z76.0 ENCOUNTER FOR MEDICATION REFILL: ICD-10-CM

## 2023-04-27 DIAGNOSIS — K59.01 SLOW TRANSIT CONSTIPATION: ICD-10-CM

## 2023-04-27 RX ORDER — PREDNISONE 20 MG/1
TABLET ORAL
Qty: 10 TABLET | Refills: 0 | Status: SHIPPED | OUTPATIENT
Start: 2023-04-27 | End: 2023-06-20

## 2023-04-27 RX ORDER — POLYETHYLENE GLYCOL 3350 17 G/17G
POWDER ORAL
Qty: 510 G | Refills: 3 | OUTPATIENT
Start: 2023-04-27

## 2023-04-27 NOTE — TELEPHONE ENCOUNTER
"Last Written Prescription Date:  4/7/22  Last Fill Quantity: 510 g ,  # refills: 12   Last office visit provider:   3/14/23    Requested Prescriptions   Pending Prescriptions Disp Refills     Polyethylene Glycol 3350 (PEG 3350) 17 GM/SCOOP POWD [Pharmacy Med Name: PEG 3350 POWD 17 Powder] 510 g 3     Sig: MIX 17 GRAMS WITH LIQUID AND DRINK ONCE DAILY FOR CONSTIPATION       Laxatives Protocol Passed - 4/27/2023  9:40 AM        Passed - Patient is age 6 or older        Passed - Recent (12 mo) or future (30 days) visit within the authorizing provider's specialty     Patient has had an office visit with the authorizing provider or a provider within the authorizing providers department within the previous 12 mos or has a future within next 30 days. See \"Patient Info\" tab in inbasket, or \"Choose Columns\" in Meds & Orders section of the refill encounter.              Passed - Medication is active on med list             Yolette Ram RN 04/27/23 4:11 PM  "

## 2023-04-27 NOTE — TELEPHONE ENCOUNTER
Phone call from patient's daughter-in-law, Billy.  States that Kulwant has been coughing for past 2-3 days and bringing up some white colored phlegm.  Also is wheezing.  No fever.  Some sob.  Asking to start her emergency medications for this.      Will send prescription for her last action plan:  Prednisone 40mg daily x 5 days.  Billy states last time she took this medication it was very helpful.

## 2023-05-01 DIAGNOSIS — K59.01 SLOW TRANSIT CONSTIPATION: ICD-10-CM

## 2023-05-01 DIAGNOSIS — Z76.0 ENCOUNTER FOR MEDICATION REFILL: Primary | ICD-10-CM

## 2023-05-01 RX ORDER — POLYETHYLENE GLYCOL 3350 17 G/17G
POWDER ORAL
Qty: 1530 G | Refills: 3 | Status: SHIPPED | OUTPATIENT
Start: 2023-05-01 | End: 2024-02-15

## 2023-05-01 NOTE — TELEPHONE ENCOUNTER
"Last Written Prescription Date:  4/7/22  Last Fill Quantity: 510 g,  # refills: 12   Last office visit provider:  3/14/23     Requested Prescriptions   Pending Prescriptions Disp Refills     Polyethylene Glycol 3350 (PEG 3350) 17 GM/SCOOP POWD 510 g 12     Sig: MIX 17 GRAMS WITH LIQUID AND DRINK ONCE DAILY FOR CONSTIPATION       Laxatives Protocol Passed - 5/1/2023  2:25 PM        Passed - Patient is age 6 or older        Passed - Recent (12 mo) or future (30 days) visit within the authorizing provider's specialty     Patient has had an office visit with the authorizing provider or a provider within the authorizing providers department within the previous 12 mos or has a future within next 30 days. See \"Patient Info\" tab in inbasket, or \"Choose Columns\" in Meds & Orders section of the refill encounter.              Passed - Medication is active on med list             Eddie Warner RN 05/01/23 2:25 PM  "

## 2023-05-15 DIAGNOSIS — B37.0 ORAL THRUSH: ICD-10-CM

## 2023-05-15 DIAGNOSIS — K21.9 GASTROESOPHAGEAL REFLUX DISEASE WITHOUT ESOPHAGITIS: Chronic | ICD-10-CM

## 2023-05-15 RX ORDER — SUCRALFATE ORAL 1 G/10ML
1 SUSPENSION ORAL 4 TIMES DAILY
Qty: 3600 ML | Refills: 1 | Status: SHIPPED | OUTPATIENT
Start: 2023-05-15 | End: 2023-12-05

## 2023-05-15 NOTE — TELEPHONE ENCOUNTER
"Routing refill request to provider for review/approval because:  Drug not on the [facility] refill protocol     Last Written Prescription Date:  10/28/22  Last Fill Quantity: 473 ml,  # refills: 1   Last office visit provider:  3/14/23     Requested Prescriptions   Pending Prescriptions Disp Refills     nystatin (MYCOSTATIN) 694127 UNIT/ML suspension 473 mL 1     Sig: Take 5 mLs (500,000 Units) by mouth 4 times daily       There is no refill protocol information for this order      Signed Prescriptions Disp Refills    sucralfate (CARAFATE) 1 GM/10ML suspension 3600 mL 1     Sig: Take 10 mLs (1 g) by mouth 4 times daily       Miscellaneous Gastrointestinal Agents Passed - 5/15/2023  9:14 AM        Passed - Recent (12 mo) or future (30 days) visit within the authorizing provider's specialty     Patient has had an office visit with the authorizing provider or a provider within the authorizing providers department within the previous 12 mos or has a future within next 30 days. See \"Patient Info\" tab in inbasket, or \"Choose Columns\" in Meds & Orders section of the refill encounter.              Passed - Medication is active on med list        Passed - Patient is 18 years of age or older             Eddie Warner RN 05/15/23 11:29 AM  "

## 2023-05-15 NOTE — TELEPHONE ENCOUNTER
"Last Written Prescription Date:  3/14/23  Last Fill Quantity: 414 ml,  # refills: 0   Last office visit provider:  3/14/23     Requested Prescriptions   Pending Prescriptions Disp Refills     nystatin (MYCOSTATIN) 690917 UNIT/ML suspension 473 mL 1     Sig: Take 5 mLs (500,000 Units) by mouth 4 times daily       There is no refill protocol information for this order        sucralfate (CARAFATE) 1 GM/10ML suspension 414 mL 0     Sig: Take 10 mLs (1 g) by mouth 4 times daily       Miscellaneous Gastrointestinal Agents Passed - 5/15/2023  9:14 AM        Passed - Recent (12 mo) or future (30 days) visit within the authorizing provider's specialty     Patient has had an office visit with the authorizing provider or a provider within the authorizing providers department within the previous 12 mos or has a future within next 30 days. See \"Patient Info\" tab in inbasket, or \"Choose Columns\" in Meds & Orders section of the refill encounter.              Passed - Medication is active on med list        Passed - Patient is 18 years of age or older             Eddie Warner RN 05/15/23 11:26 AM  "

## 2023-05-15 NOTE — TELEPHONE ENCOUNTER
Medication Question or Refill    Contacts       Type Contact Phone/Fax    05/15/2023 09:07 AM CDT Phone (Incoming) Billy Amin (Emergency Contact) [phone]          What medication are you calling about (include dose and sig)?: sucralfate (CARAFATE) 1 GM/10ML suspension, nystatin (MYCOSTATIN) 093133 UNIT/ML suspension,   chlorhexidine (PERIDEX) 0.12 % solution      Preferred Pharmacy:   [facility] - [place], [place] - [address] [place] [address] Pkw[address]  [address]  [address]  [place] [address]  Phone: [phone] Fax: [phone]      Controlled Substance Agreement on file:   CSA -- Patient Level:    CSA: None found at the patient level.       Who prescribed the medication?: Dr. Cardoza, Jenn Mike, Christel Mosley.    Do you need a refill? Yes    When did you use the medication last? Out of medications.    Patient offered an appointment? No    Do you have any questions or concerns?  No      Could we send this information to you in [facility] or would you prefer to receive a phone call?:   Patient would prefer a phone call   Okay to leave a detailed message?: Yes at Cell number on file:    Telephone Information:   Mobile [phone]

## 2023-05-16 RX ORDER — NYSTATIN 100000/ML
500000 SUSPENSION, ORAL (FINAL DOSE FORM) ORAL 4 TIMES DAILY
Qty: 473 ML | Refills: 1 | Status: SHIPPED | OUTPATIENT
Start: 2023-05-16 | End: 2023-09-13

## 2023-05-23 DIAGNOSIS — Z53.9 DIAGNOSIS NOT YET DEFINED: Primary | ICD-10-CM

## 2023-05-23 PROCEDURE — G0179 MD RECERTIFICATION HHA PT: HCPCS | Performed by: FAMILY MEDICINE

## 2023-05-24 ENCOUNTER — OFFICE VISIT (OUTPATIENT)
Dept: NEUROLOGY | Facility: CLINIC | Age: 53
End: 2023-05-24
Payer: COMMERCIAL

## 2023-05-24 VITALS
HEART RATE: 79 BPM | BODY MASS INDEX: 20.34 KG/M2 | SYSTOLIC BLOOD PRESSURE: 115 MMHG | WEIGHT: 126 LBS | DIASTOLIC BLOOD PRESSURE: 75 MMHG

## 2023-05-24 DIAGNOSIS — I63.50 CEREBROVASCULAR ACCIDENT OF RIGHT PONTINE STRUCTURE (H): ICD-10-CM

## 2023-05-24 DIAGNOSIS — G44.40 MEDICATION OVERUSE HEADACHE: Primary | ICD-10-CM

## 2023-05-24 DIAGNOSIS — G43.719 INTRACTABLE CHRONIC MIGRAINE WITHOUT AURA AND WITHOUT STATUS MIGRAINOSUS: ICD-10-CM

## 2023-05-24 PROCEDURE — 99214 OFFICE O/P EST MOD 30 MIN: CPT | Performed by: PSYCHIATRY & NEUROLOGY

## 2023-05-24 RX ORDER — AMITRIPTYLINE HYDROCHLORIDE 50 MG/1
50 TABLET ORAL AT BEDTIME
Qty: 90 TABLET | Refills: 1 | Status: SHIPPED | OUTPATIENT
Start: 2023-05-24 | End: 2023-08-10

## 2023-05-24 NOTE — LETTER
5/24/2023         RE: Kulwant Amin  [address] [address]        Dear Colleague,    Thank you for referring your patient, Kulwant Amin, to the [facility] [place]. Please see a copy of my visit note below.    NEUROLOGY OUTPATIENT PROGRESS NOTE  May 24, 2023     CHIEF COMPLAINT/REASON FOR VISIT/REASON FOR CONSULT  Patient presents with:  Follow Up: 3 mo follow-up   No changes   Continuous Headache     REASON FOR CONSULTATION- Multiple issues    REFERRAL SOURCE  DrAnalilia Referred Self  CC  Referred Self    HISTORY OF PRESENT ILLNESS  Kulwant Amin is a 53 year old female seen today for evaluation of multiple issues.    In 2021 she was found to have a right pontine stroke.  Presenting symptoms of vertigo.  No clear etiology for the stroke was identified it was thought to be lacunar.  She does have a history of hypertension, hyperlipidemia, diabetes.  She was supposed to be on Plavix though currently is only on aspirin.  No recurrence of stroke symptoms.  She is presented to the ER since then with vertigo symptoms and her imaging studies have been negative    1.  She reports that the vertigo is there all the time.  She is using meclizine which she finds some benefit with. She has done vestibular rehab without any improvement.    2.  Further complains of headaches.  These are around-the-clock.  He is taking Tylenol every 8 hours to get rid of the headaches.  Headaches are more frontal.  They can be throbbing in nature with photophobia and phonophobia.  She does continue visual auras as well.  She is on amitriptyline at nighttime.  She feels that it might be helping with the headaches.  Does not get good sleep due to COPD.  Headaches started after her stroke.    3.  No other new strokelike symptoms.    4.  Does complain of some lightheadedness especially during the exam today.  Has been put on hydrochlorothiazide by her primary care provider.    5/24/23  Patient returns  today  1.  No stroke symptoms.  Is tolerating her stroke medications  2.  Continues to have continuous headache 24/7.  Occasionally it will go away but then come back.  Is still overusing Tylenol.  Generally uses 1 tablet a day but can use up to 3 tablets a day.  Amitriptyline did help with the headaches and now they are not getting as severe as compared to before.  3.  Still feels dizzy.  Drinking plenty of water.    Previous history is reviewed and this is unchanged.    PAST MEDICAL/SURGICAL HISTORY  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     Microcytic anemia 11/17/2019     S/P coronary artery stent placement 02/02/2018     TB lung, latent     9 mos INH     Patient Active Problem List   Diagnosis     Pulmonary nodule, right     Environmental allergies     TB lung, latent     COPD (chronic obstructive pulmonary disease)/Asthma      Essential hypertension     Hyperthyroidism     Cataracts, bilateral     Corneal opacity     Irregular astigmatism     Anxiety     Chronic nonintractable headache, unspecified headache type     Coronary artery disease due to lipid rich plaque     Dizziness     Hyperlipidemia     Moderate major depression (H)     Moderate persistent asthma     Unspecified visual loss     GERD (gastroesophageal reflux disease)     Dental caries     H/O arterial ischemic stroke     Constipation     Dysphagia     Chronic abdominal pain     Insomnia     FLACO (obstructive sleep apnea)-  mild (AHI 14)     Chest pain     Chest pain, unspecified type     Type 2 diabetes mellitus treated with insulin (H)   Significant for arthritis, diabetes, high blood pressure, hyperlipidemia, stroke    FAMILY HISTORY  Family History   Problem Relation Age of Onset     Other - See Comments Mother          of an intestinal problem     Ulcers Father          of gastritis     Breast Cancer No family hx of    Negative for stroke    SOCIAL HISTORY  Social History     Tobacco Use     Smoking status: Never     Smokeless tobacco: Never     Tobacco comments:     No passive exposure   Substance Use Topics     Alcohol use: No     Drug use: No       SYSTEMS REVIEW  Twelve-system ROS was done and other than the HPI this was negative except for back pain, joint pain, numbness and tingling, weakness/paralysis, difficulty walking, balance/coordination problems, dizziness, headaches, anxiety, cardiac/heart problems, respiratory problems, snoring loudly.  No new issues.    MEDICATIONS  acetaminophen (TYLENOL) 500 MG tablet, Take 2 tablets (1,000 mg) by mouth every 8 hours as needed for mild pain  albuterol (PROAIR HFA/PROVENTIL HFA/VENTOLIN HFA) 108 (90 Base) MCG/ACT inhaler, Inhale 2 puffs into the lungs every 4 hours as needed for shortness of breath / dyspnea  albuterol (PROVENTIL) (2.5 MG/3ML) 0.083% neb solution, Take 1 vial (2.5 mg) by nebulization every 6 hours as needed  ALLERGY RELIEF 10 MG tablet, TAKE 1 TABLET (10 MG TOTAL) BY MOUTH DAILY FOR ALLERGIES  ASPIRIN LOW DOSE 81 MG EC tablet, TAKE 1 TABLET (81 MG TOTAL) BY MOUTH DAILY.  atorvastatin (LIPITOR) 80 MG tablet, TAKE 1 TABLET (80 MG TOTAL) BY MOUTH AT BEDTIME FOR CHOLESTEROL  B-D U/F 31G X 8 MM insulin pen needle, USE ONE PEN NEEDLE DAILY AS NEEDED FOR SSI  calcium carbonate (TUMS) 500 MG chewable tablet, Take 1 tablet (500 mg) by mouth 2 times daily as needed for heartburn  Continuous Blood Gluc Sensor (FREESTYLE MONICA 2 SENSOR) MISC, 1 each every 14 days Use  1 sensor every 14 days. Use to read blood sugars per 's instructions.  CONTOUR NEXT TEST test strip, USE 1 EACH AS DIRECTED 3 (THREE) TIMES A DAY.  dextromethorphan (DELSYM) 30 MG/5ML liquid, Take 10 mLs (60 mg) by mouth 2 times daily as needed for cough  diclofenac (VOLTAREN) 1 % topical gel, Apply 4 g topically 4 times daily  docusate sodium (COLACE) 100 MG capsule, Take 1 capsule (100 mg) by mouth 2 times daily as needed for constipation  dupilumab (DUPIXENT) 300 MG/2ML prefilled pen, Inject 2 mLs (300 mg) Subcutaneous every 14 days  FLUoxetine (PROZAC) 10 MG capsule, TAKE 1 CAPSULE (10 MG) BY MOUTH DAILY  fluticasone-vilanterol (BREO ELLIPTA) 200-25 MCG/ACT inhaler, Inhale 1 puff into the lungs daily  gabapentin (NEURONTIN) 300 MG capsule, Take 2 capsules (600 mg) by mouth 3 times daily  Global Inject Ease Lancets 30G MISC, USE 1 EACH AS DIRECTED 3 (THREE) TIMES A DAY. DISPENSE BRAND PER PATIENT'S INSURANCE AT PHARMACY DISCRETION.(E11.9)  hydrochlorothiazide (MICROZIDE) 12.5 MG capsule, TAKE 1 CAPSULE (12.5 MG TOTAL) BY MOUTH DAILY FOR BLOOD PRESSURE  hydrocortisone (CORTAID) 1 % external cream, Apply topically 2 times daily  insulin aspart (NOVOLOG PEN) 100 UNIT/ML pen, Inject 10 Units Subcutaneous 2 times daily (with meals)  insulin glargine (LANTUS PEN) 100 UNIT/ML pen, Inject 40 Units Subcutaneous every morning  insulin pen needle (32G X 4 MM) 32G X 4 MM miscellaneous, Use one pen needles daily or as directed.  ipratropium - albuterol 0.5 mg/2.5 mg/3 mL (DUONEB) 0.5-2.5 (3) MG/3ML neb solution, Take 1 vial (3 mLs) by nebulization every 6 hours as needed for shortness of breath / dyspnea or wheezing  lidocaine (XYLOCAINE) 5 % external ointment, Apply topically 3 times daily as needed Small amount (finger tip amount) to chest tid prn pain  meclizine (ANTIVERT) 12.5 MG tablet, TAKE 2 TABLETS (25 MG) BY MOUTH 3 TIMES DAILY AS NEEDED FOR DIZZINESS  metFORMIN (GLUCOPHAGE) 1000 MG tablet, Take 1 tablet  (1,000 mg) by mouth 2 times daily (with meals)  metoprolol tartrate (LOPRESSOR) 25 MG tablet, TAKE 1 TABLET (25 MG TOTAL) BY MOUTH 2 (TWO) TIMES A DAY FOR BLOOD PRESSURE  montelukast (SINGULAIR) 10 MG tablet, Take 1 tablet (10 mg) by mouth At Bedtime  nystatin (MYCOSTATIN) 812089 UNIT/ML suspension, Take 5 mLs (500,000 Units) by mouth 4 times daily  omeprazole (PRILOSEC) 40 MG DR capsule, Take 1 capsule (40 mg) by mouth daily  Polyethylene Glycol 3350 (PEG 3350) 17 GM/SCOOP POWD, MIX 17 GRAMS WITH LIQUID AND DRINK ONCE DAILY FOR CONSTIPATION  polyvinyl alcohol (ARTIFICIAL TEARS) 1.4 % ophthalmic solution, Place 1 drop into both eyes as needed for dry eyes  predniSONE (DELTASONE) 20 MG tablet, Take 2 tabs daily x 5 days  sucralfate (CARAFATE) 1 GM/10ML suspension, Take 10 mLs (1 g) by mouth 4 times daily  tiotropium (SPIRIVA RESPIMAT) 2.5 MCG/ACT inhaler, Inhale 2 puffs into the lungs daily    No current facility-administered medications on file prior to visit.       PHYSICAL EXAMINATION  VITALS: /75   Pulse 79   Wt 57.2 kg (126 lb)   BMI 20.34 kg/m    GENERAL: Healthy appearing, alert, no acute distress, normal habitus.  CARDIOVASCULAR: Extremities warm and well perfused. Pulses present.   NEUROLOGICAL:  Patient is awake and oriented to self, place and time.  Attention span is normal.  Memory is grossly intact.  Language is fluent and follows commands appropriately.  Appropriate fund of knowledge. Cranial nerves 2-12 are intact. There is no pronator drift.  Motor exam shows 35 strength in all extremities.  Generalized weakness due to poor effort.  Tone is symmetric bilaterally in upper and lower extremities.  (Previously reflexes are symmetric and 1+ in upper extremities and lower extremities. Sensory exam is grossly intact to light touch, pin prick and vibration.)  Finger to nose and heel to shin is without dysmetria.  Romberg is negative.  Gait is normal and the patient is able to do tandem walk and walk  on toes and heels with mild difficulty.  Orthostatic vitals    Exam similar to before.    DIAGNOSTICS  MRI 2022  IMPRESSION:  1.  No acute intracranial abnormalities identified.  2.  Minor chronic small vessel ischemic change, otherwise unremarkable contrast-enhanced MRI of the brain.    MRA                                                               IMPRESSION:  1.  Normal MRA [place].    MRA neck  IMPRESSION:  1.  Normal neck MRA.    Cardiac event monitor    ECHo  Left ventricular size, wall motion and function are normal. The ejection  fraction is 55-60%.  There is borderline anterior, septal, and apical wall hypokinesis.  Normal right ventricle size and systolic function.  No hemodynamically significant valvular abnormalities on 2D or color flow  Imaging.    CTA 2021  HEAD CT:  1.  No evidence of acute hemorrhage, mass effect, or acute vascular territorial infarction.  2.  Severe left sphenoid sinus disease.     HEAD CTA:   1.  No evidence of proximal large vessel occlusion or flow-limiting stenosis.  2.  Dorsally directed 2 mm contour irregularity arising from the distal left supraclinoid ICA may represent a tiny posterior communicating artery aneurysm.     NECK CTA:  1.  No hemodynamically significant stenosis based on NASCET criteria.  2.  Mild left V1 segment stenosis.  3.  No evidence for dissection.      MRI 2020  IMPRESSION:  MRI BRAIN:  1. No finding for acute infarct, hemorrhage, or mass.  2. There are a few very small foci of FLAIR hyperintensity within the cerebral white matter, nonspecific but compatible with small areas of gliosis and/or chronic myelin loss.     MRA [place]:  1. Congenital variation of the [place] without vascular cutoff, aneurysm, or flow-limiting stenosis.    MRI 2021  HEAD MRI:   1.  Tiny linear focus of diffusion restriction within the right dorsal diomedes suspicious for acute small vessel infarct. This is new compared to 12/07/2020.  2.  No hemorrhagic  transformation or mass effect. No additional infarct identified.  3.  Mild presumed microvascular ischemic change within the cerebral white matter.     HEAD MRA:   1.  No aneurysm, high flow AVM or significant stenosis identified.  2.  Variant [place] anatomy as above.     NECK MRA:  1.  Evaluation degraded by suboptimal timing of the contrast bolus and significant venous contamination the gadolinium bolus MRA.  2.  No hemodynamically significant stenosis in the carotid circulation by NASCET criteria.  3.  Poor visualization of the left vertebral artery origin and proximal left V1 segment. This may be artifactual, but it is difficult to exclude high-grade stenosis in this region.    MRI 2021  IMPRESSION:  1.  No mass, hemorrhage, or recent infarct identified.  2.  Diffusion abnormality involving the right ventral diomedes seen on 01/13/2021 is no longer identified. No definite residual signal abnormality in this location to confirm prior infarct. This may relate to small size and slice selection.  3.  Inflammatory changes of the paranasal sinuses including bilateral maxillary air-fluid levels. Correlate with clinical evidence for sinusitis.      RELEVANT LABS  Component      Latest Ref Rng & Units 11/30/2022 2/5/2023   WBC      4.0 - 11.0 10e3/uL  9.3   RBC Count      3.80 - 5.20 10e6/uL  4.59   Hemoglobin      11.7 - 15.7 g/dL  11.8   Hematocrit      35.0 - 47.0 %  38.1   MCV      78 - 100 fL  83   MCH      26.5 - 33.0 pg  25.7 (L)   MCHC      31.5 - 36.5 g/dL  31.0 (L)   RDW      10.0 - 15.0 %  15.1 (H)   Platelet Count      150 - 450 10e3/uL  228   % Neutrophils      %  69   % Lymphocytes      %  14   % Monocytes      %  6   % Eosinophils      %  11   % Basophils      %  0   % Immature Granulocytes      %  0   NRBCs per 100 WBC      <1 /100  0   Absolute Neutrophils      1.6 - 8.3 10e3/uL  6.4   Absolute Lymphocytes      0.8 - 5.3 10e3/uL  1.3   Absolute Monocytes      0.0 - 1.3 10e3/uL  0.6   Absolute  Eosinophils      0.0 - 0.7 10e3/uL  1.0 (H)   Absolute Basophils      0.0 - 0.2 10e3/uL  0.0   Absolute Immature Granulocytes      <=0.4 10e3/uL  0.0   Absolute NRBCs      10e3/uL  0.0   Sodium      136 - 145 mmol/L  138   Potassium      3.4 - 5.3 mmol/L  4.2   Chloride      98 - 107 mmol/L  102   Carbon Dioxide (CO2)      22 - 29 mmol/L  25   Anion Gap      7 - 15 mmol/L  11   Urea Nitrogen      6.0 - 20.0 mg/dL  10.6   Creatinine      0.51 - 0.95 mg/dL  0.58   Calcium      8.6 - 10.0 mg/dL  9.5   Glucose      70 - 99 mg/dL  156 (H)   GFR Estimate      >60 mL/min/1.73m2  >90   Hemoglobin A1C      0.0 - 5.6 % 8.6 (H)      OUTSIDE RECORDS  Outside referral notes and chart notes were reviewed and pertinent information has been summarized (in addition to the HPI):- Dr. Multani notes reviewed      ER notes above reviewed      IMPRESSION/REPORT/PLAN  Cerebrovascular accident of right pontine structure (H)  Medication overuse headache  Vertigo  Light headed  Intractable chronic migraine without aura and without status migrainosus    This is a 53 year old female history of right pontine stroke thought to be due to vascular risk factors.  Currently she is on aspirin for stroke prevention.  Further management of vascular risk factors per primary care.  Stable.    She does complain of vertigo and most likely this is related to her stroke.  Meclizine is currently helping and should continue.  She is tried vestibular rehab without any benefit.  Could be related to headaches also.  There might not be any further treatment options available for this.  Stable.    In terms of her headaches these are most suggestive of medication overuse headaches since she is overusing Tylenol.  Encourage her not to use the Tylenol more than 2-3 times per week; reemphasized this today.  There has been some improvement with the amitriptyline and further increase the dose.  This was initially used for other things I prescribed through primary care.  Headaches do have a migrainous feature and could consider other migraine medications depending on how she does.    She does complain of some lightheadedness and orthostatic vitals were negative.  Most likely this is unrelated to the stroke.  Further management per primary care.  Encourage good hydration.  Stable.    I can see her back in 3 months.    -     amitriptyline (ELAVIL) 50 MG tablet; Take 1 tablet (50 mg) by mouth At Bedtime    Return in about 2 months (around 7/24/2023) for In-Clinic Visit (must).    Over 30 minutes were spent coordinating the care for the patient on the day of the encounter.  This includes previsit, during visit and post visit activities as documented above.  Counseling patient.  Use of  requires extra time.  Prescription management.  Multiple problems reviewed.  (Activities include but not inclusive of reviewing chart, reviewing outside records, reviewing labs and imaging study results as well as the images, patient visit time including getting history and exam,  use if applicable, review of test results with the patient and coming up with a plan in a shared model, counseling patient and family, education and answering patient questions, EMR , EMR diagnosis entry and problem list management, medication reconciliation and prescription management if applicable, paperwork if applicable, printing documents and documentation of the visit activities.)        Guerrero Prescott MD  Neurologist  [facility]  Tel:- [phone]    This note was dictated using voice recognition software.  Any grammatical or context distortions are unintentional and inherent to the software.        Again, thank you for allowing me to participate in the care of your patient.        Sincerely,        Guerrero Prescott MD

## 2023-05-24 NOTE — NURSING NOTE
"Kulwant Amin is a 53 year old female who presents for:  Chief Complaint   Patient presents with     Follow Up     3 mo follow-up   No changes   Continuous Headache         Initial Vitals:  /75   Pulse 79   Wt 57.2 kg (126 lb)   BMI 20.34 kg/m   Estimated body mass index is 20.34 kg/m  as calculated from the following:    Height as of 4/3/23: 1.676 m (5' 6\").    Weight as of this encounter: 57.2 kg (126 lb).. Body surface area is 1.63 meters squared. BP completed using cuff size: wrist cuff    Jonathan Edwards  "

## 2023-05-24 NOTE — PROGRESS NOTES
NEUROLOGY OUTPATIENT PROGRESS NOTE  May 24, 2023     CHIEF COMPLAINT/REASON FOR VISIT/REASON FOR CONSULT  Patient presents with:  Follow Up: 3 mo follow-up   No changes   Continuous Headache     REASON FOR CONSULTATION- Multiple issues    REFERRAL SOURCE   Referred Self  CC  Referred Self    HISTORY OF PRESENT ILLNESS  Kulwant Amin is a 53 year old female seen today for evaluation of multiple issues.    In 2021 she was found to have a right pontine stroke.  Presenting symptoms of vertigo.  No clear etiology for the stroke was identified it was thought to be lacunar.  She does have a history of hypertension, hyperlipidemia, diabetes.  She was supposed to be on Plavix though currently is only on aspirin.  No recurrence of stroke symptoms.  She is presented to the ER since then with vertigo symptoms and her imaging studies have been negative    1.  She reports that the vertigo is there all the time.  She is using meclizine which she finds some benefit with. She has done vestibular rehab without any improvement.    2.  Further complains of headaches.  These are around-the-clock.  He is taking Tylenol every 8 hours to get rid of the headaches.  Headaches are more frontal.  They can be throbbing in nature with photophobia and phonophobia.  She does continue visual auras as well.  She is on amitriptyline at nighttime.  She feels that it might be helping with the headaches.  Does not get good sleep due to COPD.  Headaches started after her stroke.    3.  No other new strokelike symptoms.    4.  Does complain of some lightheadedness especially during the exam today.  Has been put on hydrochlorothiazide by her primary care provider.    5/24/23  Patient returns today  1.  No stroke symptoms.  Is tolerating her stroke medications  2.  Continues to have continuous headache 24/7.  Occasionally it will go away but then come back.  Is still overusing Tylenol.  Generally uses 1 tablet a day but can use up to 3 tablets a day.   Amitriptyline did help with the headaches and now they are not getting as severe as compared to before.  3.  Still feels dizzy.  Drinking plenty of water.    Previous history is reviewed and this is unchanged.    PAST MEDICAL/SURGICAL HISTORY  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     Microcytic anemia 11/17/2019     S/P coronary artery stent placement 02/02/2018     TB lung, latent     9 mos INH     Patient Active Problem List   Diagnosis     Pulmonary nodule, right     Environmental allergies     TB lung, latent     COPD (chronic obstructive pulmonary disease)/Asthma      Essential hypertension     Hyperthyroidism     Cataracts, bilateral     Corneal opacity     Irregular astigmatism     Anxiety     Chronic nonintractable headache, unspecified headache type     Coronary artery disease due to lipid rich plaque     Dizziness     Hyperlipidemia     Moderate major depression (H)     Moderate persistent asthma     Unspecified visual loss     GERD (gastroesophageal reflux disease)     Dental caries     H/O arterial ischemic stroke     Constipation     Dysphagia     Chronic abdominal pain     Insomnia     FLACO (obstructive sleep apnea)- mild (AHI 14)     Chest pain     Chest pain, unspecified type     Type 2 diabetes mellitus treated with insulin (H)   Significant for arthritis, diabetes, high blood pressure, hyperlipidemia, stroke    FAMILY HISTORY  Family History   Problem Relation Age of  Onset     Other - See Comments Mother          of an intestinal problem     Ulcers Father          of gastritis     Breast Cancer No family hx of    Negative for stroke    SOCIAL HISTORY  Social History     Tobacco Use     Smoking status: Never     Smokeless tobacco: Never     Tobacco comments:     No passive exposure   Substance Use Topics     Alcohol use: No     Drug use: No       SYSTEMS REVIEW  Twelve-system ROS was done and other than the HPI this was negative except for back pain, joint pain, numbness and tingling, weakness/paralysis, difficulty walking, balance/coordination problems, dizziness, headaches, anxiety, cardiac/heart problems, respiratory problems, snoring loudly.  No new issues.    MEDICATIONS  acetaminophen (TYLENOL) 500 MG tablet, Take 2 tablets (1,000 mg) by mouth every 8 hours as needed for mild pain  albuterol (PROAIR HFA/PROVENTIL HFA/VENTOLIN HFA) 108 (90 Base) MCG/ACT inhaler, Inhale 2 puffs into the lungs every 4 hours as needed for shortness of breath / dyspnea  albuterol (PROVENTIL) (2.5 MG/3ML) 0.083% neb solution, Take 1 vial (2.5 mg) by nebulization every 6 hours as needed  ALLERGY RELIEF 10 MG tablet, TAKE 1 TABLET (10 MG TOTAL) BY MOUTH DAILY FOR ALLERGIES  ASPIRIN LOW DOSE 81 MG EC tablet, TAKE 1 TABLET (81 MG TOTAL) BY MOUTH DAILY.  atorvastatin (LIPITOR) 80 MG tablet, TAKE 1 TABLET (80 MG TOTAL) BY MOUTH AT BEDTIME FOR CHOLESTEROL  B-D U/F 31G X 8 MM insulin pen needle, USE ONE PEN NEEDLE DAILY AS NEEDED FOR SSI  calcium carbonate (TUMS) 500 MG chewable tablet, Take 1 tablet (500 mg) by mouth 2 times daily as needed for heartburn  Continuous Blood Gluc Sensor (FREESTYLE MONICA 2 SENSOR) [facility], 1 each every 14 days Use 1 sensor every 14 days. Use to read blood sugars per 's instructions.  CONTOUR NEXT TEST test strip, USE 1 EACH AS DIRECTED 3 (THREE) TIMES A DAY.  dextromethorphan (DELSYM) 30 MG/5ML liquid, Take 10 mLs (60 mg) by mouth 2 times daily as needed  for cough  diclofenac (VOLTAREN) 1 % topical gel, Apply 4 g topically 4 times daily  docusate sodium (COLACE) 100 MG capsule, Take 1 capsule (100 mg) by mouth 2 times daily as needed for constipation  dupilumab (DUPIXENT) 300 MG/2ML prefilled pen, Inject 2 mLs (300 mg) Subcutaneous every 14 days  FLUoxetine (PROZAC) 10 MG capsule, TAKE 1 CAPSULE (10 MG) BY MOUTH DAILY  fluticasone-vilanterol (BREO ELLIPTA) 200-25 MCG/ACT inhaler, Inhale 1 puff into the lungs daily  gabapentin (NEURONTIN) 300 MG capsule, Take 2 capsules (600 mg) by mouth 3 times daily  Global Inject Ease Lancets 30G MISC, USE 1 EACH AS DIRECTED 3 (THREE) TIMES A DAY. DISPENSE BRAND PER PATIENT'S INSURANCE AT PHARMACY DISCRETION.(E11.9)  hydrochlorothiazide (MICROZIDE) 12.5 MG capsule, TAKE 1 CAPSULE (12.5 MG TOTAL) BY MOUTH DAILY FOR BLOOD PRESSURE  hydrocortisone (CORTAID) 1 % external cream, Apply topically 2 times daily  insulin aspart (NOVOLOG PEN) 100 UNIT/ML pen, Inject 10 Units Subcutaneous 2 times daily (with meals)  insulin glargine (LANTUS PEN) 100 UNIT/ML pen, Inject 40 Units Subcutaneous every morning  insulin pen needle (32G X 4 MM) 32G X 4 MM miscellaneous, Use one pen needles daily or as directed.  ipratropium - albuterol 0.5 mg/2.5 mg/3 mL (DUONEB) 0.5-2.5 (3) MG/3ML neb solution, Take 1 vial (3 mLs) by nebulization every 6 hours as needed for shortness of breath / dyspnea or wheezing  lidocaine (XYLOCAINE) 5 % external ointment, Apply topically 3 times daily as needed Small amount (finger tip amount) to chest tid prn pain  meclizine (ANTIVERT) 12.5 MG tablet, TAKE 2 TABLETS (25 MG) BY MOUTH 3 TIMES DAILY AS NEEDED FOR DIZZINESS  metFORMIN (GLUCOPHAGE) 1000 MG tablet, Take 1 tablet (1,000 mg) by mouth 2 times daily (with meals)  metoprolol tartrate (LOPRESSOR) 25 MG tablet, TAKE 1 TABLET (25 MG TOTAL) BY MOUTH 2 (TWO) TIMES A DAY FOR BLOOD PRESSURE  montelukast (SINGULAIR) 10 MG tablet, Take 1 tablet (10 mg) by mouth At  Bedtime  nystatin (MYCOSTATIN) 411740 UNIT/ML suspension, Take 5 mLs (500,000 Units) by mouth 4 times daily  omeprazole (PRILOSEC) 40 MG DR capsule, Take 1 capsule (40 mg) by mouth daily  Polyethylene Glycol 3350 (PEG 3350) 17 GM/SCOOP POWD, MIX 17 GRAMS WITH LIQUID AND DRINK ONCE DAILY FOR CONSTIPATION  polyvinyl alcohol (ARTIFICIAL TEARS) 1.4 % ophthalmic solution, Place 1 drop into both eyes as needed for dry eyes  predniSONE (DELTASONE) 20 MG tablet, Take 2 tabs daily x 5 days  sucralfate (CARAFATE) 1 GM/10ML suspension, Take 10 mLs (1 g) by mouth 4 times daily  tiotropium (SPIRIVA RESPIMAT) 2.5 MCG/ACT inhaler, Inhale 2 puffs into the lungs daily    No current facility-administered medications on file prior to visit.       PHYSICAL EXAMINATION  VITALS: /75   Pulse 79   Wt 57.2 kg (126 lb)   BMI 20.34 kg/m    GENERAL: Healthy appearing, alert, no acute distress, normal habitus.  CARDIOVASCULAR: Extremities warm and well perfused. Pulses present.   NEUROLOGICAL:  Patient is awake and oriented to self, place and time.  Attention span is normal.  Memory is grossly intact.  Language is fluent and follows commands appropriately.  Appropriate fund of knowledge. Cranial nerves 2-12 are intact. There is no pronator drift.  Motor exam shows 35 strength in all extremities.  Generalized weakness due to poor effort.  Tone is symmetric bilaterally in upper and lower extremities.  (Previously reflexes are symmetric and 1+ in upper extremities and lower extremities. Sensory exam is grossly intact to light touch, pin prick and vibration.)  Finger to nose and heel to shin is without dysmetria.  Romberg is negative.  Gait is normal and the patient is able to do tandem walk and walk on toes and heels with mild difficulty.  Orthostatic vitals    Exam similar to before.    DIAGNOSTICS  MRI 2022  IMPRESSION:  1.  No acute intracranial abnormalities identified.  2.  Minor chronic small vessel ischemic change, otherwise  unremarkable contrast-enhanced MRI of the brain.    MRA                                                               IMPRESSION:  1.  Normal MRA [place].    MRA neck  IMPRESSION:  1.  Normal neck MRA.    Cardiac event monitor    ECHo  Left ventricular size, wall motion and function are normal. The ejection  fraction is 55-60%.  There is borderline anterior, septal, and apical wall hypokinesis.  Normal right ventricle size and systolic function.  No hemodynamically significant valvular abnormalities on 2D or color flow  Imaging.    CTA 2021  HEAD CT:  1.  No evidence of acute hemorrhage, mass effect, or acute vascular territorial infarction.  2.  Severe left sphenoid sinus disease.     HEAD CTA:   1.  No evidence of proximal large vessel occlusion or flow-limiting stenosis.  2.  Dorsally directed 2 mm contour irregularity arising from the distal left supraclinoid ICA may represent a tiny posterior communicating artery aneurysm.     NECK CTA:  1.  No hemodynamically significant stenosis based on NASCET criteria.  2.  Mild left V1 segment stenosis.  3.  No evidence for dissection.      MRI 2020  IMPRESSION:  MRI BRAIN:  1. No finding for acute infarct, hemorrhage, or mass.  2. There are a few very small foci of FLAIR hyperintensity within the cerebral white matter, nonspecific but compatible with small areas of gliosis and/or chronic myelin loss.     MRA [place]:  1. Congenital variation of the [place] without vascular cutoff, aneurysm, or flow-limiting stenosis.    MRI 2021  HEAD MRI:   1.  Tiny linear focus of diffusion restriction within the right dorsal diomedes suspicious for acute small vessel infarct. This is new compared to 12/07/2020.  2.  No hemorrhagic transformation or mass effect. No additional infarct identified.  3.  Mild presumed microvascular ischemic change within the cerebral white matter.     HEAD MRA:   1.  No aneurysm, high flow AVM or significant stenosis identified.  2.   Variant [place] anatomy as above.     NECK MRA:  1.  Evaluation degraded by suboptimal timing of the contrast bolus and significant venous contamination the gadolinium bolus MRA.  2.  No hemodynamically significant stenosis in the carotid circulation by NASCET criteria.  3.  Poor visualization of the left vertebral artery origin and proximal left V1 segment. This may be artifactual, but it is difficult to exclude high-grade stenosis in this region.    MRI 2021  IMPRESSION:  1.  No mass, hemorrhage, or recent infarct identified.  2.  Diffusion abnormality involving the right ventral diomedes seen on 01/13/2021 is no longer identified. No definite residual signal abnormality in this location to confirm prior infarct. This may relate to small size and slice selection.  3.  Inflammatory changes of the paranasal sinuses including bilateral maxillary air-fluid levels. Correlate with clinical evidence for sinusitis.      RELEVANT LABS  Component      Latest Ref Rng & Units 11/30/2022 2/5/2023   WBC      4.0 - 11.0 10e3/uL  9.3   RBC Count      3.80 - 5.20 10e6/uL  4.59   Hemoglobin      11.7 - 15.7 g/dL  11.8   Hematocrit      35.0 - 47.0 %  38.1   MCV      78 - 100 fL  83   MCH      26.5 - 33.0 pg  25.7 (L)   MCHC      31.5 - 36.5 g/dL  31.0 (L)   RDW      10.0 - 15.0 %  15.1 (H)   Platelet Count      150 - 450 10e3/uL  228   % Neutrophils      %  69   % Lymphocytes      %  14   % Monocytes      %  6   % Eosinophils      %  11   % Basophils      %  0   % Immature Granulocytes      %  0   NRBCs per 100 WBC      <1 /100  0   Absolute Neutrophils      1.6 - 8.3 10e3/uL  6.4   Absolute Lymphocytes      0.8 - 5.3 10e3/uL  1.3   Absolute Monocytes      0.0 - 1.3 10e3/uL  0.6   Absolute Eosinophils      0.0 - 0.7 10e3/uL  1.0 (H)   Absolute Basophils      0.0 - 0.2 10e3/uL  0.0   Absolute Immature Granulocytes      <=0.4 10e3/uL  0.0   Absolute NRBCs      10e3/uL  0.0   Sodium      136 - 145 mmol/L  138   Potassium      3.4  - 5.3 mmol/L  4.2   Chloride      98 - 107 mmol/L  102   Carbon Dioxide (CO2)      22 - 29 mmol/L  25   Anion Gap      7 - 15 mmol/L  11   Urea Nitrogen      6.0 - 20.0 mg/dL  10.6   Creatinine      0.51 - 0.95 mg/dL  0.58   Calcium      8.6 - 10.0 mg/dL  9.5   Glucose      70 - 99 mg/dL  156 (H)   GFR Estimate      >60 mL/min/1.73m2  >90   Hemoglobin A1C      0.0 - 5.6 % 8.6 (H)      OUTSIDE RECORDS  Outside referral notes and chart notes were reviewed and pertinent information has been summarized (in addition to the HPI):- Dr. Multani notes reviewed      ER notes above reviewed      IMPRESSION/REPORT/PLAN  Cerebrovascular accident of right pontine structure (H)  Medication overuse headache  Vertigo  Light headed  Intractable chronic migraine without aura and without status migrainosus    This is a 53 year old female history of right pontine stroke thought to be due to vascular risk factors.  Currently she is on aspirin for stroke prevention.  Further management of vascular risk factors per primary care.  Stable.    She does complain of vertigo and most likely this is related to her stroke.  Meclizine is currently helping and should continue.  She is tried vestibular rehab without any benefit.  Could be related to headaches also.  There might not be any further treatment options available for this.  Stable.    In terms of her headaches these are most suggestive of medication overuse headaches since she is overusing Tylenol.  Encourage her not to use the Tylenol more than 2-3 times per week; reemphasized this today.  There has been some improvement with the amitriptyline and further increase the dose.  This was initially used for other things I prescribed through primary care. Headaches do have a migrainous feature and could consider other migraine medications depending on how she does.    She does complain of some lightheadedness and orthostatic vitals were negative.  Most likely this is unrelated to the stroke.   Further management per primary care.  Encourage good hydration.  Stable.    I can see her back in 3 months.    -     amitriptyline (ELAVIL) 50 MG tablet; Take 1 tablet (50 mg) by mouth At Bedtime    Return in about 2 months (around 7/24/2023) for In-Clinic Visit (must).    Over 30 minutes were spent coordinating the care for the patient on the day of the encounter.  This includes previsit, during visit and post visit activities as documented above.  Counseling patient.  Use of  requires extra time.  Prescription management.  Multiple problems reviewed.  (Activities include but not inclusive of reviewing chart, reviewing outside records, reviewing labs and imaging study results as well as the images, patient visit time including getting history and exam,  use if applicable, review of test results with the patient and coming up with a plan in a shared model, counseling patient and family, education and answering patient questions, EMR , EMR diagnosis entry and problem list management, medication reconciliation and prescription management if applicable, paperwork if applicable, printing documents and documentation of the visit activities.)        Guerrero Prescott MD  Neurologist  [facility]  Tel:- [phone]    This note was dictated using voice recognition software.  Any grammatical or context distortions are unintentional and inherent to the software.

## 2023-05-28 DIAGNOSIS — F41.9 ANXIETY: ICD-10-CM

## 2023-05-28 RX ORDER — FLUOXETINE 10 MG/1
10 CAPSULE ORAL DAILY
Qty: 90 CAPSULE | Refills: 2 | Status: SHIPPED | OUTPATIENT
Start: 2023-05-28 | End: 2024-02-12

## 2023-05-28 NOTE — TELEPHONE ENCOUNTER
"Last Written Prescription Date:  3/31/2023  Last Fill Quantity: 30,  # refills: 1   Last office visit provider:  3/14/2023     Requested Prescriptions   Pending Prescriptions Disp Refills     FLUoxetine (PROZAC) 10 MG capsule [Pharmacy Med Name: FLUOXETINE HCL 10 MG CAP 10 Capsule] 30 capsule 1     Sig: TAKE 1 CAPSULE (10 MG) BY MOUTH DAILY       SSRIs Protocol Passed - 5/28/2023 10:20 AM        Passed - Recent (12 mo) or future (30 days) visit within the authorizing provider's specialty     Patient has had an office visit with the authorizing provider or a provider within the authorizing providers department within the previous 12 mos or has a future within next 30 days. See \"Patient Info\" tab in inbasket, or \"Choose Columns\" in Meds & Orders section of the refill encounter.              Passed - Medication is active on med list        Passed - Patient is age 18 or older        Passed - No active pregnancy on record        Passed - No positive pregnancy test in last 12 months             Martine Gillette RN 05/28/23 4:44 PM  "

## 2023-06-04 DIAGNOSIS — K04.7 DENTAL INFECTION: ICD-10-CM

## 2023-06-04 RX ORDER — ACETAMINOPHEN 500 MG
TABLET ORAL
Qty: 100 TABLET | Refills: 10 | Status: SHIPPED | OUTPATIENT
Start: 2023-06-04

## 2023-06-04 NOTE — TELEPHONE ENCOUNTER
"Last Written Prescription Date:  2/4/23  Last Fill Quantity: 30,  # refills: 0   Last office visit provider:  3/14/23     Requested Prescriptions   Pending Prescriptions Disp Refills     acetaminophen (TYLENOL) 500 MG tablet [Pharmacy Med Name: ACETAMINOPHEN 500 MG TABS 500 Tablet] 100 tablet 10     Sig: TAKE 1 PILL BY MOUTH EVERY 6 HOURS AS NEEDED FOR PAIN       Analgesics (Non-Narcotic Tylenol and ASA Only) Passed - 6/4/2023 10:40 AM        Passed - Recent (12 mo) or future (30 days) visit within the authorizing provider's specialty     Patient has had an office visit with the authorizing provider or a provider within the authorizing providers department within the previous 12 mos or has a future within next 30 days. See \"Patient Info\" tab in inbasket, or \"Choose Columns\" in Meds & Orders section of the refill encounter.              Passed - Patient is 7 months old or older     If patient is a peds patient of the age 7 mos -12 years, ok to refill using weight-based dosing.     If >3g daily and/or sig is not \"prn\", check for liver enzymes. If normal in the last year, ok to refill.  If not, refer to the provider.          Passed - Medication is active on med list             Kailee Patton 06/04/23 1:24 PM  "

## 2023-06-12 ENCOUNTER — TELEPHONE (OUTPATIENT)
Dept: PULMONOLOGY | Facility: CLINIC | Age: 53
End: 2023-06-12
Payer: COMMERCIAL

## 2023-06-12 DIAGNOSIS — J45.40 MODERATE PERSISTENT ASTHMA WITHOUT COMPLICATION: Primary | Chronic | ICD-10-CM

## 2023-06-12 DIAGNOSIS — J44.9 CHRONIC OBSTRUCTIVE PULMONARY DISEASE, UNSPECIFIED COPD TYPE (H): Chronic | ICD-10-CM

## 2023-06-12 RX ORDER — PREDNISONE 20 MG/1
40 TABLET ORAL DAILY
Qty: 10 TABLET | Refills: 0 | Status: SHIPPED | OUTPATIENT
Start: 2023-06-12 | End: 2023-06-17

## 2023-06-12 NOTE — TELEPHONE ENCOUNTER
Billy, daughter in law, called today to report Kulwant having symptoms of wheezing, increase cough and clear colored phlegm. Denies fever. Will send in action plan per Jenn Mike CNP of prednisone 40 mg x 5 days.

## 2023-06-20 ENCOUNTER — APPOINTMENT (OUTPATIENT)
Dept: RADIOLOGY | Facility: HOSPITAL | Age: 53
End: 2023-06-20
Attending: EMERGENCY MEDICINE
Payer: COMMERCIAL

## 2023-06-20 ENCOUNTER — HOSPITAL ENCOUNTER (EMERGENCY)
Facility: HOSPITAL | Age: 53
Discharge: HOME | End: 2023-06-20
Attending: EMERGENCY MEDICINE | Admitting: EMERGENCY MEDICINE
Payer: COMMERCIAL

## 2023-06-20 VITALS
BODY MASS INDEX: 20.34 KG/M2 | WEIGHT: 126 LBS | TEMPERATURE: 98.6 F | HEART RATE: 106 BPM | DIASTOLIC BLOOD PRESSURE: 70 MMHG | SYSTOLIC BLOOD PRESSURE: 126 MMHG | OXYGEN SATURATION: 98 % | RESPIRATION RATE: 16 BRPM

## 2023-06-20 DIAGNOSIS — J45.901 ASTHMA WITH ACUTE EXACERBATION, UNSPECIFIED ASTHMA SEVERITY, UNSPECIFIED WHETHER PERSISTENT: ICD-10-CM

## 2023-06-20 DIAGNOSIS — J06.9 VIRAL UPPER RESPIRATORY ILLNESS: ICD-10-CM

## 2023-06-20 LAB
ANION GAP SERPL CALCULATED.3IONS-SCNC: 12 MMOL/L (ref 7–15)
BASOPHILS # BLD AUTO: 0.1 10E3/UL (ref 0–0.2)
BASOPHILS NFR BLD AUTO: 1 %
BUN SERPL-MCNC: 7.3 MG/DL (ref 6–20)
CALCIUM SERPL-MCNC: 10.1 MG/DL (ref 8.6–10)
CHLORIDE SERPL-SCNC: 102 MMOL/L (ref 98–107)
CREAT SERPL-MCNC: 0.55 MG/DL (ref 0.51–0.95)
DEPRECATED HCO3 PLAS-SCNC: 24 MMOL/L (ref 22–29)
EOSINOPHIL # BLD AUTO: 1.4 10E3/UL (ref 0–0.7)
EOSINOPHIL NFR BLD AUTO: 13 %
ERYTHROCYTE [DISTWIDTH] IN BLOOD BY AUTOMATED COUNT: 15.3 % (ref 10–15)
FLUAV RNA SPEC QL NAA+PROBE: NEGATIVE
FLUBV RNA RESP QL NAA+PROBE: NEGATIVE
GFR SERPL CREATININE-BSD FRML MDRD: >90 ML/MIN/1.73M2
GLUCOSE SERPL-MCNC: 189 MG/DL (ref 70–99)
GROUP A STREP BY PCR: NOT DETECTED
HCT VFR BLD AUTO: 36.5 % (ref 35–47)
HGB BLD-MCNC: 11.2 G/DL (ref 11.7–15.7)
HOLD SPECIMEN: NORMAL
IMM GRANULOCYTES # BLD: 0 10E3/UL
IMM GRANULOCYTES NFR BLD: 0 %
LYMPHOCYTES # BLD AUTO: 2.2 10E3/UL (ref 0.8–5.3)
LYMPHOCYTES NFR BLD AUTO: 21 %
MCH RBC QN AUTO: 25 PG (ref 26.5–33)
MCHC RBC AUTO-ENTMCNC: 30.7 G/DL (ref 31.5–36.5)
MCV RBC AUTO: 82 FL (ref 78–100)
MONOCYTES # BLD AUTO: 0.7 10E3/UL (ref 0–1.3)
MONOCYTES NFR BLD AUTO: 7 %
NEUTROPHILS # BLD AUTO: 6 10E3/UL (ref 1.6–8.3)
NEUTROPHILS NFR BLD AUTO: 58 %
NRBC # BLD AUTO: 0 10E3/UL
NRBC BLD AUTO-RTO: 0 /100
PLATELET # BLD AUTO: 177 10E3/UL (ref 150–450)
POTASSIUM SERPL-SCNC: 3.8 MMOL/L (ref 3.4–5.3)
RBC # BLD AUTO: 4.48 10E6/UL (ref 3.8–5.2)
RSV RNA SPEC NAA+PROBE: NEGATIVE
SARS-COV-2 RNA RESP QL NAA+PROBE: NEGATIVE
SODIUM SERPL-SCNC: 138 MMOL/L (ref 136–145)
WBC # BLD AUTO: 10.4 10E3/UL (ref 4–11)

## 2023-06-20 PROCEDURE — 87637 SARSCOV2&INF A&B&RSV AMP PRB: CPT | Performed by: EMERGENCY MEDICINE

## 2023-06-20 PROCEDURE — 94640 AIRWAY INHALATION TREATMENT: CPT

## 2023-06-20 PROCEDURE — 258N000003 HC RX IP 258 OP 636: Performed by: EMERGENCY MEDICINE

## 2023-06-20 PROCEDURE — 80048 BASIC METABOLIC PNL TOTAL CA: CPT | Performed by: EMERGENCY MEDICINE

## 2023-06-20 PROCEDURE — 250N000009 HC RX 250: Performed by: EMERGENCY MEDICINE

## 2023-06-20 PROCEDURE — 96374 THER/PROPH/DIAG INJ IV PUSH: CPT

## 2023-06-20 PROCEDURE — 87651 STREP A DNA AMP PROBE: CPT | Performed by: EMERGENCY MEDICINE

## 2023-06-20 PROCEDURE — 250N000013 HC RX MED GY IP 250 OP 250 PS 637: Performed by: EMERGENCY MEDICINE

## 2023-06-20 PROCEDURE — 36415 COLL VENOUS BLD VENIPUNCTURE: CPT | Performed by: EMERGENCY MEDICINE

## 2023-06-20 PROCEDURE — 93005 ELECTROCARDIOGRAM TRACING: CPT | Performed by: EMERGENCY MEDICINE

## 2023-06-20 PROCEDURE — 96361 HYDRATE IV INFUSION ADD-ON: CPT

## 2023-06-20 PROCEDURE — 250N000011 HC RX IP 250 OP 636: Performed by: EMERGENCY MEDICINE

## 2023-06-20 PROCEDURE — 71046 X-RAY EXAM CHEST 2 VIEWS: CPT

## 2023-06-20 PROCEDURE — 85025 COMPLETE CBC W/AUTO DIFF WBC: CPT | Performed by: EMERGENCY MEDICINE

## 2023-06-20 PROCEDURE — 99285 EMERGENCY DEPT VISIT HI MDM: CPT | Mod: 25

## 2023-06-20 RX ORDER — IPRATROPIUM BROMIDE AND ALBUTEROL SULFATE 2.5; .5 MG/3ML; MG/3ML
3 SOLUTION RESPIRATORY (INHALATION) ONCE
Status: COMPLETED | OUTPATIENT
Start: 2023-06-20 | End: 2023-06-20

## 2023-06-20 RX ORDER — PREDNISONE 20 MG/1
40 TABLET ORAL DAILY
Qty: 10 TABLET | Refills: 0 | Status: SHIPPED | OUTPATIENT
Start: 2023-06-20 | End: 2023-06-25

## 2023-06-20 RX ORDER — METHYLPREDNISOLONE SODIUM SUCCINATE 125 MG/2ML
125 INJECTION, POWDER, LYOPHILIZED, FOR SOLUTION INTRAMUSCULAR; INTRAVENOUS ONCE
Status: COMPLETED | OUTPATIENT
Start: 2023-06-20 | End: 2023-06-20

## 2023-06-20 RX ORDER — ACETAMINOPHEN 325 MG/1
650 TABLET ORAL ONCE
Status: COMPLETED | OUTPATIENT
Start: 2023-06-20 | End: 2023-06-20

## 2023-06-20 RX ADMIN — IPRATROPIUM BROMIDE AND ALBUTEROL SULFATE 3 ML: 2.5; .5 SOLUTION RESPIRATORY (INHALATION) at 14:51

## 2023-06-20 RX ADMIN — METHYLPREDNISOLONE SODIUM SUCCINATE 125 MG: 125 INJECTION INTRAMUSCULAR; INTRAVENOUS at 14:48

## 2023-06-20 RX ADMIN — SODIUM CHLORIDE 1000 ML: 9 INJECTION, SOLUTION INTRAVENOUS at 14:42

## 2023-06-20 RX ADMIN — ACETAMINOPHEN 650 MG: 325 TABLET ORAL at 14:47

## 2023-06-20 ASSESSMENT — ACTIVITIES OF DAILY LIVING (ADL): ADLS_ACUITY_SCORE: 35

## 2023-06-20 NOTE — ED PROVIDER NOTES
EMERGENCY DEPARTMENT ENCOUNTER      NAME: Kulwant Amin  AGE: 53 year old female  YOB: 1970  MRN: [id number]  EVALUATION DATE & TIME: No admission date for patient encounter.    PCP: Adrien Cardoza    ED PROVIDER: Jorge L Smith M.D.      Chief Complaint   Patient presents with     Shortness of Breath     Pharyngitis         FINAL IMPRESSION:  1. Viral upper respiratory illness    2. Asthma with acute exacerbation, unspecified asthma severity, unspecified whether persistent          ED COURSE & MEDICAL DECISION MAKIN year old female presents to the Emergency Department for evaluation of shortness of breath.  Patient has a history of asthma.  She presents with sore throat, breathing difficulty, cough.  She is mildly tachycardic but otherwise stable when she arrives to the emergency department.  She has some expiratory wheezing throughout her pulmonary examination.  She underwent lab and imaging evaluation as below.  She was negative for COVID, influenza, RSV, and strep here.  Her chest x-ray looked clear.  She has a constellation of symptoms which seem consistent with bronchitis and an upper respiratory viral syndrome triggering an asthma exacerbation.  She received some nebulizers, some IV fluid and first dose of Solu-Medrol here.  She was resting more comfortably and eager to be discharged on reevaluation.  We discussed the results of her work-up.  At this point I do not think there would be an indication for antibiotics but we will plan to keep her on 4 more days of steroids and have her aggressively increase oral liquid intake with continue Tylenol and ibuprofen for pain sore throat or fever.  Patient was discharged in stable condition of 3 reviewed return precautions and follow-up plan with an .    At the conclusion of the encounter I discussed the results of all of the tests and the disposition. The questions were answered. The patient or family acknowledged understanding and was  agreeable with the care plan.       Medical Decision Making    History:    Supplemental history from: Documented in chart, if applicable    External Record(s) reviewed: Documented in chart, if applicable.    Work Up:    Chart documentation includes differential considered and any EKGs or imaging independently interpreted by provider, where specified.    In additional to work up documented, I considered the following work up: Documented in chart, if applicable.    External consultation:    Discussion of management with another provider: Documented in chart, if applicable    Complicating factors:    Care impacted by chronic illness: Chronic Pain, Diabetes, Hyperlipidemia and Peripheral Vascular Disease    Care affected by social determinants of health: N/A    Disposition considerations: Discharge. I prescribed additional prescription strength medication(s) as charted. See documentation for any additional details.            MEDICATIONS GIVEN IN THE EMERGENCY:  Medications   0.9% sodium chloride BOLUS (0 mLs Intravenous Stopped 6/20/23 1627)   methylPREDNISolone sodium succinate (solu-MEDROL) injection 125 mg (125 mg Intravenous $Given 6/20/23 1448)   acetaminophen (TYLENOL) tablet 650 mg (650 mg Oral $Given 6/20/23 1447)   ipratropium - albuterol 0.5 mg/2.5 mg/3 mL (DUONEB) neb solution 3 mL (3 mLs Nebulization $Given 6/20/23 1451)       NEW PRESCRIPTIONS STARTED AT TODAY'S ER VISIT  Discharge Medication List as of 6/20/2023  4:28 PM             =================================================================    HPI    Patient information was obtained from: Patient    Use of : Yes  (Phone) - Language Pepito        Kulwant Amin is a 53 year old female with a pertinent history of Diabetes, coronary artery disease, COPD, asthma, and hyperlipidemia who presents to this ED via wheel chair with family member for evaluation of Shortness of breath and pharyngitis    The patient reports that she has had a sore  throat non productive cough, and Shortness of breath for about 24 hours now. She is mostly concerned about the sore throat today. She thinks that her shortness of breath is jsut her asthma acting up. She notes that her blood sugar was in the 200's. Otherwise denies any other symptoms such as abdominal pain and URI symptoms.       REVIEW OF SYSTEMS   All systems reviewed and negative except as noted in HPI.    PAST MEDICAL HISTORY:  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     Microcytic anemia 11/17/2019     S/P coronary artery stent placement 02/02/2018     TB lung, latent     9 mos INH       PAST SURGICAL HISTORY:  Past Surgical History:   Procedure Laterality Date     CORONARY STENT PLACEMENT  2018     CV CORONARY ANGIOGRAM N/A 1/25/2018    Procedure: Coronary Angiogram;  Surgeon: Angelo Serrano MD;  Location: [facility] Cath Lab;  Service:      CV CORONARY ANGIOGRAM N/A 5/5/2022    Procedure: Coronary Angiogram;  Surgeon: Irwin Cano MD;  Location: [facility] CATH LAB CV     CV CORONARY ANGIOGRAM  5/5/2022    Procedure: ;  Surgeon: Irwin Cano MD;  Location: [facility] CV     TN ESOPHAGOGASTRODUODENOSCOPY TRANSORAL DIAGNOSTIC N/A 12/10/2018    Procedure: ESOPHAGOGASTRODUODENOSCOPY (EGD);  Surgeon: Eddie Renteria MD;  Location: [facility] GI;  Service: Gastroenterology     TN ESOPHAGOGASTRODUODENOSCOPY TRANSORAL DIAGNOSTIC N/A 12/3/2020    Procedure:  ESOPHAGOGASTRODUODENOSCOPY (EGD) with biospies ;  Surgeon: Avi Crow MD;  Location: [facility];  Service: Gastroenterology     [facility] COLONOSCOPY W/WO BRUSH/WASH N/A 12/10/2018    Procedure: COLONOSCOPY with polypectomy using biopsy forceps;  Surgeon: Eddie Renteria MD;  Location: [facility];  Service: Gastroenterology           CURRENT MEDICATIONS:    No current facility-administered medications for this encounter.     Current Outpatient Medications   Medication     predniSONE (DELTASONE) 20 MG tablet     acetaminophen (TYLENOL) 500 MG tablet     albuterol (PROAIR HFA/PROVENTIL HFA/VENTOLIN HFA) 108 (90 Base) MCG/ACT inhaler     albuterol (PROVENTIL) (2.5 MG/3ML) 0.083% neb solution     ALLERGY RELIEF 10 MG tablet     amitriptyline (ELAVIL) 50 MG tablet     ASPIRIN LOW DOSE 81 MG EC tablet     atorvastatin (LIPITOR) 80 MG tablet     B-D U/F 31G X 8 MM insulin pen needle     calcium carbonate (TUMS) 500 MG chewable tablet     Continuous Blood Gluc Sensor (FREESTYLE MONICA 2 SENSOR) MISC     CONTOUR NEXT TEST test strip     dextromethorphan (DELSYM) 30 MG/5ML liquid     diclofenac (VOLTAREN) 1 % topical gel     docusate sodium (COLACE) 100 MG capsule     dupilumab (DUPIXENT) 300 MG/2ML prefilled pen     FLUoxetine (PROZAC) 10 MG capsule     fluticasone-vilanterol (BREO ELLIPTA) 200-25 MCG/ACT inhaler     gabapentin (NEURONTIN) 300 MG capsule     Global Inject Ease Lancets 30G MISC     hydrochlorothiazide (MICROZIDE) 12.5 MG capsule     hydrocortisone (CORTAID) 1 % external cream     insulin aspart (NOVOLOG PEN) 100 UNIT/ML pen     insulin glargine (LANTUS PEN) 100 UNIT/ML pen     insulin pen needle (32G X 4 MM) 32G X 4 MM miscellaneous     ipratropium - albuterol 0.5 mg/2.5 mg/3 mL (DUONEB) 0.5-2.5 (3) MG/3ML neb solution     lidocaine (XYLOCAINE) 5 % external ointment     meclizine (ANTIVERT) 12.5 MG tablet     metFORMIN (GLUCOPHAGE) 1000 MG tablet     metoprolol tartrate (LOPRESSOR) 25 MG tablet      montelukast (SINGULAIR) 10 MG tablet     nystatin (MYCOSTATIN) 283811 UNIT/ML suspension     omeprazole (PRILOSEC) 40 MG DR capsule     Polyethylene Glycol 3350 (PEG 3350) 17 GM/SCOOP POWD     polyvinyl alcohol (ARTIFICIAL TEARS) 1.4 % ophthalmic solution     sucralfate (CARAFATE) 1 GM/10ML suspension     tiotropium (SPIRIVA RESPIMAT) 2.5 MCG/ACT inhaler         ALLERGIES:  No Known Allergies    FAMILY HISTORY:  Family History   Problem Relation Age of Onset     Other - See Comments Mother          of an intestinal problem     Ulcers Father          of gastritis     Breast Cancer No family hx of        SOCIAL HISTORY:   Social History     Socioeconomic History     Marital status:    Tobacco Use     Smoking status: Never     Smokeless tobacco: Never     Tobacco comments:     No passive exposure   Substance and Sexual Activity     Alcohol use: No     Drug use: No     Sexual activity: Yes     Partners: Male   Social History Narrative    2017 The patient lives with her daughter-in-law (who is present), , son, and 2 grandchildren (total of 6 people). Immigrant.       VITALS:  /70   Pulse 106   Temp 98.6  F (37  C) (Oral)   Resp 16   Wt 57.2 kg (126 lb)   SpO2 98%   BMI 20.34 kg/m      PHYSICAL EXAM    Constitutional: Well developed, Well nourished, NAD.  HENT: Normocephalic, Atraumatic. Neck Supple.  Eyes: EOMI, Conjunctiva normal.  Respiratory: No respiratory distress.  Breathing comfortably on room air.  Speaking full sentences.  There is expiratory wheezing throughout all lung fields with overall good air movement and no other lower abnormal lung sounds appreciated  Cardiovascular: Normal heart rate, Regular rhythm. No peripheral edema.  Abdomen: Soft, nontender  Musculoskeletal: Good range of motion in all major joints. No major deformities noted.  Integument: Warm, Dry.  Neurologic: Alert & awake, Normal motor function, Normal sensory function, No focal deficits noted.    Psychiatric: Cooperative. Affect appropriate.     LAB:  All pertinent labs reviewed and interpreted.  Labs Ordered and Resulted from Time of ED Arrival to Time of ED Departure   BASIC METABOLIC PANEL - Abnormal       Result Value    Sodium 138      Potassium 3.8      Chloride 102      Carbon Dioxide (CO2) 24      Anion Gap 12      Urea Nitrogen 7.3      Creatinine 0.55      Calcium 10.1 (*)     Glucose 189 (*)     GFR Estimate >90     CBC WITH PLATELETS AND DIFFERENTIAL - Abnormal    WBC Count 10.4      RBC Count 4.48      Hemoglobin 11.2 (*)     Hematocrit 36.5      MCV 82      MCH 25.0 (*)     MCHC 30.7 (*)     RDW 15.3 (*)     Platelet Count 177      % Neutrophils 58      % Lymphocytes 21      % Monocytes 7      % Eosinophils 13      % Basophils 1      % Immature Granulocytes 0      NRBCs per 100 WBC 0      Absolute Neutrophils 6.0      Absolute Lymphocytes 2.2      Absolute Monocytes 0.7      Absolute Eosinophils 1.4 (*)     Absolute Basophils 0.1      Absolute Immature Granulocytes 0.0      Absolute NRBCs 0.0     INFLUENZA A/B, RSV, & SARS-COV2 PCR - Normal    Influenza A PCR Negative      Influenza B PCR Negative      RSV PCR Negative      SARS CoV2 PCR Negative     GROUP A STREPTOCOCCUS PCR THROAT SWAB - Normal    Group A strep by PCR Not Detected         RADIOLOGY:  Reviewed all pertinent imaging. Please see official radiology report.  Chest XR,  PA & LAT   Final Result   IMPRESSION: No acute findings. Benign calcified granuloma right midlung.          EKG:    Performed at: 15:30    Impression:   Sinus tachycardia  Otherwise normal ECG    Rate: 102 bpm  Rhythm: sinus tachycardia  Axis: -10  [place] Interval: 160 ms  QRS Interval: 92 ms  QTc Interval: 368/479  Comparison: 3/27/23 No change    I have independently reviewed and interpreted the EKG(s) documented above.          I, Patrice Hilario, am serving as a scribe to document services personally performed by Dr. Jorge L Smith, based on my observation and the  provider's statements to me. I, Jorge L Smith MD attest that Patrice Pavanjamiede is acting in a scribe capacity, has observed my performance of the services and has documented them in accordance with my direction.    Jorge L Smith M.D.  Emergency Medicine  [facility] EMERGENCY DEPARTMENT  [address] [place] [id number]  [phone]  Dept: [phone]     Jorge L Smith MD  06/20/23 3994     cool

## 2023-06-20 NOTE — ED TRIAGE NOTES
Patient presents here for a sore throat that has occurred over the past 24 hours, making it difficult to swallow. Additionally, she notes that her blood glucose has been high over the last two days. Blood sugar this morning was 250. She notes shortness of breath as well. Hx of asthma.     Information gathered with [place] interpretor from [facility], #[id number]

## 2023-06-20 NOTE — DISCHARGE INSTRUCTIONS
You were seen in the emergency department for sore throat and shortness of breath.  We think your symptoms are consistent with an upper respiratory infection, likely viral, causing sore throat, cough, and triggering an asthma exacerbation.  We are going to start you on a few days of steroids.  You received the first dose here but can start taking prednisone 40 mg daily starting tomorrow.  We would recommend continued regular use of your inhalers and/or nebulizer at home.  You can use Tylenol 650 mg and ibuprofen 600 mg as needed for pain or fever.  Please plan to follow-up in clinic within a few days after this ED visit to review any ongoing symptoms.

## 2023-06-21 DIAGNOSIS — Z76.0 ENCOUNTER FOR MEDICATION REFILL: ICD-10-CM

## 2023-06-21 NOTE — TELEPHONE ENCOUNTER
"Routing refill request to provider for review/approval because:  Labs out of range:  CKT    Last Written Prescription Date:  03/03/2023  Last Fill Quantity: 30,  # refills: 3   Last office visit provider:  03/14/2023     Requested Prescriptions   Pending Prescriptions Disp Refills     atorvastatin (LIPITOR) 80 MG tablet [Pharmacy Med Name: ATORVASTATIN CALCIUM 80 MG 80 Tablet] 30 tablet 3     Sig: TAKE 1 TABLET (80 MG TOTAL) BY MOUTH AT BEDTIME FOR CHOLESTEROL       Statins Protocol Passed - 6/21/2023  3:21 PM        Passed - LDL on file in past 12 months     Recent Labs   Lab Test 03/14/23  1140   LDL 41             Passed - No abnormal creatine kinase in past 12 months     Recent Labs   Lab Test 05/07/22  0511   CKT 23*                Passed - Recent (12 mo) or future (30 days) visit within the authorizing provider's specialty     Patient has had an office visit with the authorizing provider or a provider within the authorizing providers department within the previous 12 mos or has a future within next 30 days. See \"Patient Info\" tab in inbasket, or \"Choose Columns\" in Meds & Orders section of the refill encounter.              Passed - Medication is active on med list        Passed - Patient is age 18 or older        Passed - No active pregnancy on record        Passed - No positive pregnancy test in past 12 months             Ralf Ortiz RN 06/21/23 3:24 PM  " normal

## 2023-06-22 RX ORDER — ATORVASTATIN CALCIUM 80 MG/1
TABLET, FILM COATED ORAL
Qty: 30 TABLET | Refills: 3 | Status: SHIPPED | OUTPATIENT
Start: 2023-06-22 | End: 2023-10-10

## 2023-06-28 DIAGNOSIS — I10 ESSENTIAL HYPERTENSION: ICD-10-CM

## 2023-06-28 RX ORDER — HYDROCHLOROTHIAZIDE 12.5 MG/1
CAPSULE ORAL
Qty: 90 CAPSULE | Refills: 2 | Status: SHIPPED | OUTPATIENT
Start: 2023-06-28 | End: 2023-12-14

## 2023-06-29 ENCOUNTER — OFFICE VISIT (OUTPATIENT)
Dept: FAMILY MEDICINE | Facility: CLINIC | Age: 53
End: 2023-06-29
Payer: COMMERCIAL

## 2023-06-29 VITALS
RESPIRATION RATE: 16 BRPM | HEART RATE: 92 BPM | DIASTOLIC BLOOD PRESSURE: 66 MMHG | TEMPERATURE: 98.2 F | WEIGHT: 125.5 LBS | OXYGEN SATURATION: 99 % | BODY MASS INDEX: 20.17 KG/M2 | HEIGHT: 66 IN | SYSTOLIC BLOOD PRESSURE: 108 MMHG

## 2023-06-29 DIAGNOSIS — J44.9 CHRONIC OBSTRUCTIVE PULMONARY DISEASE, UNSPECIFIED COPD TYPE (H): Chronic | ICD-10-CM

## 2023-06-29 DIAGNOSIS — K21.9 GASTROESOPHAGEAL REFLUX DISEASE WITHOUT ESOPHAGITIS: Chronic | ICD-10-CM

## 2023-06-29 DIAGNOSIS — I10 ESSENTIAL HYPERTENSION: ICD-10-CM

## 2023-06-29 DIAGNOSIS — Z79.4 TYPE 2 DIABETES MELLITUS TREATED WITH INSULIN (H): Primary | ICD-10-CM

## 2023-06-29 DIAGNOSIS — J45.40 MODERATE PERSISTENT ASTHMA WITHOUT COMPLICATION: Chronic | ICD-10-CM

## 2023-06-29 DIAGNOSIS — E78.5 HYPERLIPIDEMIA, UNSPECIFIED HYPERLIPIDEMIA TYPE: Chronic | ICD-10-CM

## 2023-06-29 DIAGNOSIS — E11.9 TYPE 2 DIABETES MELLITUS TREATED WITH INSULIN (H): Primary | ICD-10-CM

## 2023-06-29 PROCEDURE — 90471 IMMUNIZATION ADMIN: CPT | Performed by: FAMILY MEDICINE

## 2023-06-29 PROCEDURE — 90715 TDAP VACCINE 7 YRS/> IM: CPT | Performed by: FAMILY MEDICINE

## 2023-06-29 PROCEDURE — 99214 OFFICE O/P EST MOD 30 MIN: CPT | Mod: 25 | Performed by: FAMILY MEDICINE

## 2023-06-29 ASSESSMENT — PATIENT HEALTH QUESTIONNAIRE - PHQ9
10. IF YOU CHECKED OFF ANY PROBLEMS, HOW DIFFICULT HAVE THESE PROBLEMS MADE IT FOR YOU TO DO YOUR WORK, TAKE CARE OF THINGS AT HOME, OR GET ALONG WITH OTHER PEOPLE: NOT DIFFICULT AT ALL
SUM OF ALL RESPONSES TO PHQ QUESTIONS 1-9: 10
SUM OF ALL RESPONSES TO PHQ QUESTIONS 1-9: 10

## 2023-06-29 NOTE — TELEPHONE ENCOUNTER
"Last Written Prescription Date:  7/6/2022  Last Fill Quantity: 90,  # refills: 3   Last office visit provider:  3/14/2023     Requested Prescriptions   Pending Prescriptions Disp Refills     hydrochlorothiazide (MICROZIDE) 12.5 MG capsule [Pharmacy Med Name: HYDROCHLOROTHIAZIDE 12.5 MG 12.5 Capsule] 90 capsule 3     Sig: TAKE 1 CAPSULE (12.5 MG TOTAL) BY MOUTH DAILY FOR BLOOD PRESSURE       Diuretics (Including Combos) Protocol Passed - 6/28/2023  9:18 AM        Passed - Blood pressure under 140/90 in past 12 months     BP Readings from Last 3 Encounters:   06/20/23 126/70   05/24/23 115/75   04/04/23 126/60                 Passed - Recent (12 mo) or future (30 days) visit within the authorizing provider's specialty     Patient has had an office visit with the authorizing provider or a provider within the authorizing providers department within the previous 12 mos or has a future within next 30 days. See \"Patient Info\" tab in inbasket, or \"Choose Columns\" in Meds & Orders section of the refill encounter.              Passed - Medication is active on med list        Passed - Patient is age 18 or older        Passed - No active pregancy on record        Passed - Normal serum creatinine on file in past 12 months     Recent Labs   Lab Test 06/20/23  1436   CR 0.55              Passed - Normal serum potassium on file in past 12 months     Recent Labs   Lab Test 06/20/23  1436   POTASSIUM 3.8                    Passed - Normal serum sodium on file in past 12 months     Recent Labs   Lab Test 06/20/23  1436                 Passed - No positive pregnancy test in past 12 months             Elizabeth Miller RN 06/28/23 11:33 PM  "

## 2023-06-29 NOTE — PROGRESS NOTES
"  Type 2 diabetes mellitus treated with insulin (H)  No med changes today.  Recheck A1c at next visit.  Follow-up in 2 months.    Hyperlipidemia, unspecified hyperlipidemia type  Continue current medication.  Recheck lipids at next visit.    Essential hypertension  Controlled.    Gastroesophageal reflux disease without esophagitis  Stable.    Chronic obstructive pulmonary disease, unspecified COPD type (H)  Managed by pulmonology.    Moderate persistent asthma without complication  Managed by pulmonology.    Doug Blum is a 53 year old female with multiple chronic medical conditions seeing multiple specialists here for med check.  She sees cardiology, pulmonology and allergy specialists.    She is here with her daughter-in-law who is managing her medications.  Blood sugars are \" good\" per daughter-in-law.  No low blood sugar with hypoglycemic symptoms since last visit.  Last A1c was 7.4 in 3/23.  She was seen in the ED 9 days ago due to acute asthma exacerbation.  He was given antibiotic.  No acute cough or shortness of breath since the ED visit discharge.  No new concerns or complaints today.  She is relatively doing well, at her baseline for general health.        3/14/2023    11:05 AM   Additional Questions   Roomed by [place]   Accompanied by Daughter in law : Billy       Review of Systems   CONSTITUTIONAL: NEGATIVE for fever, chills, change in weight  CV: NEGATIVE for chest pain/chest pressure      Objective    /66 (BP Location: Left arm, Patient Position: Sitting, Cuff Size: Adult Regular)   Pulse 92   Temp 98.2  F (36.8  C) (Oral)   Resp 16   Ht 1.676 m (5' 6\")   Wt 56.9 kg (125 lb 8 oz)   SpO2 99%   BMI 20.26 kg/m    Body mass index is 20.26 kg/m .  Physical Exam   GENERAL: healthy, alert and no distress  NECK: Supple.   RESP: Coarse breath sounds bilaterally with mild expiratory wheezing both lung fields.  No crackles or rhonchi.  CV: regular rates and rhythm, no murmur, click or " rub and no peripheral edema  ABDOMEN: soft, nontender, no hepatosplenomegaly, no masses and bowel sounds normal  PSYCH: mentation appears normal, affect normal/bright                Answers for HPI/ROS submitted by the patient on 6/29/2023  If you checked off any problems, how difficult have these problems made it for you to do your work, take care of things at home, or get along with other people?: Not difficult at all  PHQ9 TOTAL SCORE: 10  What is the reason for your visit today? : f/u med complex

## 2023-07-06 ENCOUNTER — OFFICE VISIT (OUTPATIENT)
Dept: PULMONOLOGY | Facility: CLINIC | Age: 53
End: 2023-07-06
Payer: COMMERCIAL

## 2023-07-06 VITALS
WEIGHT: 122.2 LBS | OXYGEN SATURATION: 96 % | BODY MASS INDEX: 19.72 KG/M2 | SYSTOLIC BLOOD PRESSURE: 114 MMHG | HEART RATE: 83 BPM | DIASTOLIC BLOOD PRESSURE: 68 MMHG

## 2023-07-06 DIAGNOSIS — J45.40 MODERATE PERSISTENT ASTHMA WITHOUT COMPLICATION: Primary | Chronic | ICD-10-CM

## 2023-07-06 PROCEDURE — 99214 OFFICE O/P EST MOD 30 MIN: CPT | Performed by: INTERNAL MEDICINE

## 2023-07-06 ASSESSMENT — ASTHMA QUESTIONNAIRES
QUESTION_5 LAST FOUR WEEKS HOW WOULD YOU RATE YOUR ASTHMA CONTROL: SOMEWHAT CONTROLLED
QUESTION_2 LAST FOUR WEEKS HOW OFTEN HAVE YOU HAD SHORTNESS OF BREATH: MORE THAN ONCE A DAY
QUESTION_1 LAST FOUR WEEKS HOW MUCH OF THE TIME DID YOUR ASTHMA KEEP YOU FROM GETTING AS MUCH DONE AT WORK, SCHOOL OR AT HOME: ALL OF THE TIME
QUESTION_4 LAST FOUR WEEKS HOW OFTEN HAVE YOU USED YOUR RESCUE INHALER OR NEBULIZER MEDICATION (SUCH AS ALBUTEROL): ONE OR TWO TIMES PER DAY
ACT_TOTALSCORE: 9
ACT_TOTALSCORE: 9
QUESTION_3 LAST FOUR WEEKS HOW OFTEN DID YOUR ASTHMA SYMPTOMS (WHEEZING, COUGHING, SHORTNESS OF BREATH, CHEST TIGHTNESS OR PAIN) WAKE YOU UP AT NIGHT OR EARLIER THAN USUAL IN THE MORNING: TWO OR THREE NIGHTS A WEEK

## 2023-07-06 NOTE — PROGRESS NOTES
Pulmonary Outpatient Clinic Follow Up  April 4, 2023      Assessment/Plan:    53 year old woman with history of organic fuel exposure in the home was previously had nondiagnostic PFTs.  She was previously evaluated by Dr. Marie and initiated on Dupixent for her hypereosinophilia.  She is feeling OK today and is not reporting active exacerbation symptoms. Recent ED visit for chest pain, good response to GI cocktail and planned f/u NM stress test.     Continue Breo Ellipta 1 puff daily.  She knows to gargle after using this.     Continue Spiriva.    Continue to use 3 times daily albuterol nebs.    Continue Singulair daily     Continue omeprazole 40 mg daily.    As needed albuterol for rescue.    Has received both doses of her Covid-19 vaccine, UTD on booster    Has had seasonal flu vaccine    Continue Tums as needed for gastric upset.     Continue Dupixent through Dr. Marie    Suggested follow up with GI in light of continued reflux on omeprazole and good response to GI cocktail in ED. She is being seen at [facility] per our [place] scheduling team.       Follow-up in person in 2 months    Jenn Mike CNP  Pulmonary Medicine  [facility]   [phone]      Subjective:   Patient ID: Ms. Amin is a 53 year old female with a past medical history significant for anxiety, arthritis, questionable asthma versus COPD, diabetes, hypertension and a prior history of SVT presents with her daughter in law for a follow-up visit for her pulmonary complaints.  She follows fairly closely for her moderate-persistent, difficult to control asthma symptoms.   Recent ED visit for chest pain, history of LAD stent placed for symptoms of exertional angina. She began to have some chest recurrent discomfort on 3/27/2023. She was seen in the ED, serial troponins were negative, ECG was unrevealing. Per ED note the discomfort seemed to respond to a GI cocktail. NM stress test and follow up with cardiology scheduled.     Since last clinic visit,  she has not had another exacerbation and reports baseline symptoms of cough and wheeze.  She continues to be compliant with her Breo and Incruse Ellipta but notes that Breo is no longer covered by her insurance and that she will have to switch this, [place]. She also continues to be compliant with her Dupixent injections at home.           11/14/2022     8:00 AM 4/4/2023     1:00 PM 7/6/2023     8:00 AM   ACT Total Scores   ACT TOTAL SCORE (Goal Greater than or Equal to 20) 10 8 9   In the past 12 months, how many times did you visit the emergency room for your asthma without being admitted to the hospital? 3 1 0   In the past 12 months, how many times were you hospitalized overnight because of your asthma? 3 0 0       Current Outpatient Medications   Medication Sig Dispense Refill     acetaminophen (TYLENOL) 500 MG tablet TAKE 1 PILL BY MOUTH EVERY 6 HOURS AS NEEDED FOR PAIN 100 tablet 10     albuterol (PROAIR HFA/PROVENTIL HFA/VENTOLIN HFA) 108 (90 Base) MCG/ACT inhaler Inhale 2 puffs into the lungs every 4 hours as needed for shortness of breath / dyspnea 4 g 11     albuterol (PROVENTIL) (2.5 MG/3ML) 0.083% neb solution Take 1 vial (2.5 mg) by nebulization every 6 hours as needed 90 mL 11     ALLERGY RELIEF 10 MG tablet TAKE 1 TABLET (10 MG TOTAL) BY MOUTH DAILY FOR ALLERGIES 30 tablet 3     amitriptyline (ELAVIL) 50 MG tablet Take 1 tablet (50 mg) by mouth At Bedtime 90 tablet 1     ASPIRIN LOW DOSE 81 MG EC tablet TAKE 1 TABLET (81 MG TOTAL) BY MOUTH DAILY. 90 tablet 3     atorvastatin (LIPITOR) 80 MG tablet TAKE 1 TABLET (80 MG TOTAL) BY MOUTH AT BEDTIME FOR CHOLESTEROL 30 tablet 3     B-D U/F 31G X 8 MM insulin pen needle USE ONE PEN NEEDLE DAILY AS NEEDED FOR  each 1     calcium carbonate (TUMS) 500 MG chewable tablet Take 1 tablet (500 mg) by mouth 2 times daily as needed for heartburn 30 tablet 0     Continuous Blood Gluc Sensor (FREESTYLE MONICA 2 SENSOR) MISC 1 each every 14 days Use 1 sensor every  14 days. Use to read blood sugars per 's instructions. 2 each 11     CONTOUR NEXT TEST test strip USE 1 EACH AS DIRECTED 3 (THREE) TIMES A DAY. 50 strip 11     dextromethorphan (DELSYM) 30 MG/5ML liquid Take 10 mLs (60 mg) by mouth 2 times daily as needed for cough 178 mL 0     diclofenac (VOLTAREN) 1 % topical gel Apply 4 g topically 4 times daily 350 g 3     docusate sodium (COLACE) 100 MG capsule Take 1 capsule (100 mg) by mouth 2 times daily as needed for constipation 90 capsule 3     dupilumab (DUPIXENT) 300 MG/2ML prefilled pen Inject 2 mLs (300 mg) Subcutaneous every 14 days 4 mL 5     FLUoxetine (PROZAC) 10 MG capsule TAKE 1 CAPSULE (10 MG) BY MOUTH DAILY 90 capsule 2     fluticasone-vilanterol (BREO ELLIPTA) 200-25 MCG/ACT inhaler Inhale 1 puff into the lungs daily 60 each 11     gabapentin (NEURONTIN) 300 MG capsule Take 2 capsules (600 mg) by mouth 3 times daily 540 capsule 3     Global Inject Ease Lancets 30G MISC USE 1 EACH AS DIRECTED 3 (THREE) TIMES A DAY. DISPENSE BRAND PER PATIENT'S INSURANCE AT PHARMACY DISCRETION.(E11.9) 100 each 3     hydrochlorothiazide (MICROZIDE) 12.5 MG capsule TAKE 1 CAPSULE (12.5 MG TOTAL) BY MOUTH DAILY FOR BLOOD PRESSURE 90 capsule 2     hydrocortisone (CORTAID) 1 % external cream Apply topically 2 times daily 60 g 0     insulin aspart (NOVOLOG PEN) 100 UNIT/ML pen Inject 10 Units Subcutaneous 2 times daily (with meals) 15 mL 3     insulin glargine (LANTUS PEN) 100 UNIT/ML pen Inject 40 Units Subcutaneous every morning 45 mL 3     insulin pen needle (32G X 4 MM) 32G X 4 MM miscellaneous Use one pen needles daily or as directed. 200 each 11     ipratropium - albuterol 0.5 mg/2.5 mg/3 mL (DUONEB) 0.5-2.5 (3) MG/3ML neb solution Take 1 vial (3 mLs) by nebulization every 6 hours as needed for shortness of breath / dyspnea or wheezing 360 mL 11     lidocaine (XYLOCAINE) 5 % external ointment Apply topically 3 times daily as needed Small amount (finger tip amount)  to chest tid prn pain 35.44 g 0     meclizine (ANTIVERT) 12.5 MG tablet TAKE 2 TABLETS (25 MG) BY MOUTH 3 TIMES DAILY AS NEEDED FOR DIZZINESS 90 tablet 0     metFORMIN (GLUCOPHAGE) 1000 MG tablet Take 1 tablet (1,000 mg) by mouth 2 times daily (with meals) 180 tablet 1     metoprolol tartrate (LOPRESSOR) 25 MG tablet TAKE 1 TABLET (25 MG TOTAL) BY MOUTH 2 (TWO) TIMES A DAY FOR BLOOD PRESSURE 180 tablet 3     montelukast (SINGULAIR) 10 MG tablet Take 1 tablet (10 mg) by mouth At Bedtime 30 tablet 11     nystatin (MYCOSTATIN) 808769 UNIT/ML suspension Take 5 mLs (500,000 Units) by mouth 4 times daily 473 mL 1     omeprazole (PRILOSEC) 40 MG DR capsule Take 1 capsule (40 mg) by mouth daily 90 capsule 3     Polyethylene Glycol 3350 (PEG 3350) 17 GM/SCOOP POWD MIX 17 GRAMS WITH LIQUID AND DRINK ONCE DAILY FOR CONSTIPATION 1530 g 3     polyvinyl alcohol (ARTIFICIAL TEARS) 1.4 % ophthalmic solution Place 1 drop into both eyes as needed for dry eyes 15 mL 3     sucralfate (CARAFATE) 1 GM/10ML suspension Take 10 mLs (1 g) by mouth 4 times daily 3600 mL 1     tiotropium (SPIRIVA RESPIMAT) 2.5 MCG/ACT inhaler Inhale 2 puffs into the lungs daily 4 g 11     Social history:  Lives in a house built in 1963; no concern for mold in the house.  7 People live in the house including 2 children.  There are no pets in the house.  She is a never smoker and there is no second hand exposure to smoke.  She does not drink alcoholic beverages and denies indulging in recreational drugs.    Physical Exam   /68 (BP Location: Left arm, Patient Position: Sitting, Cuff Size: Adult Regular)   Pulse 83   Wt 55.4 kg (122 lb 3.2 oz)   SpO2 96%   BMI 19.72 kg/m      Physical Exam  Constitutional:       General: She is not in acute distress.     Appearance: She is not ill-appearing or diaphoretic.   Cardiovascular:      Rate and Rhythm: Normal rate and regular rhythm.      Pulses: Normal pulses.      Heart sounds: Normal heart sounds.    Pulmonary:      Effort: Pulmonary effort is normal. No respiratory distress.      Breath sounds: No wheezing or rhonchi.   Musculoskeletal:      Right lower leg: No edema.      Left lower leg: No edema.   Skin:     General: Skin is warm and dry.      Findings: No rash.   Neurological:      Mental Status: She is alert.   Psychiatric:         Behavior: Behavior normal.            Latest Reference Range & Units 02/07/22 15:47   Neutrophil Cytoplasmic Antibody <1:10  <1:10   Neutrophil Cytoplasmic Antibody Pattern  The ANCA IFA is <1:10.  No further testing will be performed.      Latest Reference Range & Units 02/07/22 15:47   Schistosoma Ab IgG Negative  Negative [1]   Strongyloides Bere IgG <=0.9 IV 0.4 [2]      Latest Reference Range & Units 02/07/22 15:47   Immunoglobulin E <=214 kU/L 74 [1]   Allergen Fungimold A Fumigatus IgE <=0.34 kU/L <0.10 [2]   Aspergillus Fumagatis 1 Antibody None Detected  None Detected [3]   Aspergillus Fumagatis 6 Antibody None Detected  None Detected [4]   Allergen, Interp, Immunocap Score IgE  See Note [5]     XR CHEST 2 VIEWS, DATE/TIME: 3/27/2023 8:32 AM  INDICATION: chest pain  COMPARISON: 02/05/2023                                                              IMPRESSION: No pneumothorax or pleural effusion. No focal consolidation. Cardiac silhouette within normal limits. Mildly atherosclerotic aorta.    CT CHEST W/O CONTRAST, DATE/TIME: 7/11/2022 11:28 PM  INDICATION: Chest pain.  COMPARISON: 5/4/2022.  TECHNIQUE: CT chest without IV contrast. Multiplanar reformats were obtained. Dose reduction techniques were used.  CONTRAST: None.  FINDINGS:   LUNGS AND PLEURA: There is atelectasis and scarring at the lung bases. Scarring at the lung apices. Mild dependent atelectasis bilaterally. Subpleural calcified granuloma or calcified plaque in the right lower lobe laterally. Stable 2 mm nodule in the   right upper lobe posteriorly. Stable 3 mm nodule in the lingula laterally. Tiny nodule  along the left major fissure laterally is stable. No pneumothorax or pleural effusion.  MEDIASTINUM/AXILLAE: No lymph node enlargement. Central airways are unremarkable. The heart size is normal.  CORONARY ARTERY CALCIFICATION: Previous intervention (stents or CABG).                                                             IMPRESSION:   1.  No acute abnormality. No cause of chest pain is evident.  2.  A few small lung nodules without significant change.  Recommendations for one or multiple incidental lung nodules < 6mm :    Low risk patients: No routine follow-up.    High risk patients: Optional follow-up CT at 12 months; if unchanged, no further follow-up.    ECHO 5/4/2022  Interpretation Summary  Left ventricular size, wall motion and function are normal. The ejection  fraction is 55-60%.  There is borderline anterior, septal, and apical wall hypokinesis.  Normal right ventricle size and systolic function.  No hemodynamically significant valvular abnormalities on 2D or color flow  imaging.

## 2023-07-06 NOTE — PROGRESS NOTES
Pulmonary Outpatient Clinic Follow Up  07/06/23      Assessment/Plan:    53 year old woman with history of organic fuel exposure in the home was previously had nondiagnostic PFTs.  She was previously evaluated by Dr. Marie and initiated on Dupixent for her hypereosinophilia.      1. Moderate persistent asthma: Follows in clinic for this and has baseline symptoms regularly.    Continue Breo Ellipta 1 puff daily.  She knows to gargle after using this.     Continue Spiriva.    Continue to use 3 times daily albuterol nebs.    Continue Singulair daily.    As needed albuterol rescue.    Dupixent twice a month prescribed through the allergy clinic.  2. Questionable eosinophilic esophagitis:     Is due to see GI in a couple of weeks for this.    Omeprazole 40 mg a day.    As needed Tums.  3. Follow-up: 3 months with me.    Tamra Duong MD  Pulmonary and Critical Care  (p) [phone]      Subjective:   Patient ID: Ms. Amin is a 53 year old female with a past medical history significant for anxiety, arthritis, questionable asthma versus COPD, diabetes, hypertension and a prior history of SVT presents with her daughter in law for a follow-up visit for her pulmonary complaints.  She was last seen in clinic couple months ago,-severe persistent asthma with fairly closely has noted that the patient does well on her first week of Dupixent injections but that she becomes more wheezing in the second week.  She has brought this up with Dr. Marie who advised that if her asthma is not worsening, this was not an indication to increase her Dupixent dose.  The patient's coughing and shortness of breath and no worse than baseline and she looks slightly better as since the last time I seen her.  At the last clinic visit, she was also referred to GI for concerns of eosinophilic esophagitis and is due to see them.  She continues to use Breo and Spiriva once a day, uses her Ventolin inhaler once a day and nebulizers a couple times a day  depending on how shortness of breath is.  She is also continuing to be compliant with her Dupixent injections every other week.           11/14/2022     8:00 AM 4/4/2023     1:00 PM 7/6/2023     8:00 AM   ACT Total Scores   ACT TOTAL SCORE (Goal Greater than or Equal to 20) 10 8 9   In the past 12 months, how many times did you visit the emergency room for your asthma without being admitted to the hospital? 3 1 0   In the past 12 months, how many times were you hospitalized overnight because of your asthma? 3 0 0       Current Outpatient Medications   Medication Sig Dispense Refill     acetaminophen (TYLENOL) 500 MG tablet TAKE 1 PILL BY MOUTH EVERY 6 HOURS AS NEEDED FOR PAIN 100 tablet 10     albuterol (PROAIR HFA/PROVENTIL HFA/VENTOLIN HFA) 108 (90 Base) MCG/ACT inhaler Inhale 2 puffs into the lungs every 4 hours as needed for shortness of breath / dyspnea 4 g 11     albuterol (PROVENTIL) (2.5 MG/3ML) 0.083% neb solution Take 1 vial (2.5 mg) by nebulization every 6 hours as needed 90 mL 11     ALLERGY RELIEF 10 MG tablet TAKE 1 TABLET (10 MG TOTAL) BY MOUTH DAILY FOR ALLERGIES 30 tablet 3     amitriptyline (ELAVIL) 50 MG tablet Take 1 tablet (50 mg) by mouth At Bedtime 90 tablet 1     ASPIRIN LOW DOSE 81 MG EC tablet TAKE 1 TABLET (81 MG TOTAL) BY MOUTH DAILY. 90 tablet 3     atorvastatin (LIPITOR) 80 MG tablet TAKE 1 TABLET (80 MG TOTAL) BY MOUTH AT BEDTIME FOR CHOLESTEROL 30 tablet 3     B-D U/F 31G X 8 MM insulin pen needle USE ONE PEN NEEDLE DAILY AS NEEDED FOR  each 1     calcium carbonate (TUMS) 500 MG chewable tablet Take 1 tablet (500 mg) by mouth 2 times daily as needed for heartburn 30 tablet 0     Continuous Blood Gluc Sensor (FREESTYLE MONICA 2 SENSOR) MISC 1 each every 14 days Use 1 sensor every 14 days. Use to read blood sugars per 's instructions. 2 each 11     CONTOUR NEXT TEST test strip USE 1 EACH AS DIRECTED 3 (THREE) TIMES A DAY. 50 strip 11     dextromethorphan (DELSYM) 30  MG/5ML liquid Take 10 mLs (60 mg) by mouth 2 times daily as needed for cough 178 mL 0     diclofenac (VOLTAREN) 1 % topical gel Apply 4 g topically 4 times daily 350 g 3     docusate sodium (COLACE) 100 MG capsule Take 1 capsule (100 mg) by mouth 2 times daily as needed for constipation 90 capsule 3     dupilumab (DUPIXENT) 300 MG/2ML prefilled pen Inject 2 mLs (300 mg) Subcutaneous every 14 days 4 mL 5     FLUoxetine (PROZAC) 10 MG capsule TAKE 1 CAPSULE (10 MG) BY MOUTH DAILY 90 capsule 2     fluticasone-vilanterol (BREO ELLIPTA) 200-25 MCG/ACT inhaler Inhale 1 puff into the lungs daily 60 each 11     gabapentin (NEURONTIN) 300 MG capsule Take 2 capsules (600 mg) by mouth 3 times daily 540 capsule 3     Global Inject Ease Lancets 30G MISC USE 1 EACH AS DIRECTED 3 (THREE) TIMES A DAY. DISPENSE BRAND PER PATIENT'S INSURANCE AT PHARMACY DISCRETION.(E11.9) 100 each 3     hydrochlorothiazide (MICROZIDE) 12.5 MG capsule TAKE 1 CAPSULE (12.5 MG TOTAL) BY MOUTH DAILY FOR BLOOD PRESSURE 90 capsule 2     hydrocortisone (CORTAID) 1 % external cream Apply topically 2 times daily 60 g 0     insulin aspart (NOVOLOG PEN) 100 UNIT/ML pen Inject 10 Units Subcutaneous 2 times daily (with meals) 15 mL 3     insulin glargine (LANTUS PEN) 100 UNIT/ML pen Inject 40 Units Subcutaneous every morning 45 mL 3     insulin pen needle (32G X 4 MM) 32G X 4 MM miscellaneous Use one pen needles daily or as directed. 200 each 11     ipratropium - albuterol 0.5 mg/2.5 mg/3 mL (DUONEB) 0.5-2.5 (3) MG/3ML neb solution Take 1 vial (3 mLs) by nebulization every 6 hours as needed for shortness of breath / dyspnea or wheezing 360 mL 11     lidocaine (XYLOCAINE) 5 % external ointment Apply topically 3 times daily as needed Small amount (finger tip amount) to chest tid prn pain 35.44 g 0     meclizine (ANTIVERT) 12.5 MG tablet TAKE 2 TABLETS (25 MG) BY MOUTH 3 TIMES DAILY AS NEEDED FOR DIZZINESS 90 tablet 0     metFORMIN (GLUCOPHAGE) 1000 MG tablet Take 1  tablet (1,000 mg) by mouth 2 times daily (with meals) 180 tablet 1     metoprolol tartrate (LOPRESSOR) 25 MG tablet TAKE 1 TABLET (25 MG TOTAL) BY MOUTH 2 (TWO) TIMES A DAY FOR BLOOD PRESSURE 180 tablet 3     montelukast (SINGULAIR) 10 MG tablet Take 1 tablet (10 mg) by mouth At Bedtime 30 tablet 11     nystatin (MYCOSTATIN) 675095 UNIT/ML suspension Take 5 mLs (500,000 Units) by mouth 4 times daily 473 mL 1     omeprazole (PRILOSEC) 40 MG DR capsule Take 1 capsule (40 mg) by mouth daily 90 capsule 3     Polyethylene Glycol 3350 (PEG 3350) 17 GM/SCOOP POWD MIX 17 GRAMS WITH LIQUID AND DRINK ONCE DAILY FOR CONSTIPATION 1530 g 3     polyvinyl alcohol (ARTIFICIAL TEARS) 1.4 % ophthalmic solution Place 1 drop into both eyes as needed for dry eyes 15 mL 3     sucralfate (CARAFATE) 1 GM/10ML suspension Take 10 mLs (1 g) by mouth 4 times daily 3600 mL 1     tiotropium (SPIRIVA RESPIMAT) 2.5 MCG/ACT inhaler Inhale 2 puffs into the lungs daily 4 g 11     Social history:  Lives in a house built in [address]; no concern for mold in the house.  7 People live in the house including 2 children.  There are no pets in the house.  She is a never smoker and there is no second hand exposure to smoke.  She does not drink alcoholic beverages and denies indulging in recreational drugs.    Physical Exam   /68 (BP Location: Left arm, Patient Position: Sitting, Cuff Size: Adult Regular)   Pulse 83   Wt 55.4 kg (122 lb 3.2 oz)   SpO2 96%   BMI 19.72 kg/m      Physical Exam  Constitutional:       General: She is not in acute distress.     Appearance: She is not ill-appearing or diaphoretic.   Cardiovascular:      Rate and Rhythm: Normal rate and regular rhythm.      Pulses: Normal pulses.      Heart sounds: Normal heart sounds.   Pulmonary:      Effort: Pulmonary effort is normal. No respiratory distress.      Breath sounds: No wheezing or rhonchi.   Musculoskeletal:      Right lower leg: No edema.      Left lower leg: No edema.    Skin:     General: Skin is warm and dry.      Findings: No rash.   Neurological:      Mental Status: She is alert.   Psychiatric:         Behavior: Behavior normal.            Latest Reference Range & Units 02/07/22 15:47   Neutrophil Cytoplasmic Antibody <1:10  <1:10   Neutrophil Cytoplasmic Antibody Pattern  The ANCA IFA is <1:10.  No further testing will be performed.      Latest Reference Range & Units 02/07/22 15:47   Schistosoma Ab IgG Negative  Negative [1]   Strongyloides Bere IgG <=0.9 IV 0.4 [2]      Latest Reference Range & Units 02/07/22 15:47   Immunoglobulin E <=214 kU/L 74 [1]   Allergen Fungimold A Fumigatus IgE <=0.34 kU/L <0.10 [2]   Aspergillus Fumagatis 1 Antibody None Detected  None Detected [3]   Aspergillus Fumagatis 6 Antibody None Detected  None Detected [4]   Allergen, Interp, Immunocap Score IgE  See Note [5]     XR CHEST 2 VIEWS, DATE/TIME: 3/27/2023 8:32 AM  INDICATION: chest pain  COMPARISON: 02/05/2023                                                              IMPRESSION: No pneumothorax or pleural effusion. No focal consolidation. Cardiac silhouette within normal limits. Mildly atherosclerotic aorta.    CT CHEST W/O CONTRAST, DATE/TIME: 7/11/2022 11:28 PM  INDICATION: Chest pain.  COMPARISON: 5/4/2022.  TECHNIQUE: CT chest without IV contrast. Multiplanar reformats were obtained. Dose reduction techniques were used.  CONTRAST: None.  FINDINGS:   LUNGS AND PLEURA: There is atelectasis and scarring at the lung bases. Scarring at the lung apices. Mild dependent atelectasis bilaterally. Subpleural calcified granuloma or calcified plaque in the right lower lobe laterally. Stable 2 mm nodule in the   right upper lobe posteriorly. Stable 3 mm nodule in the lingula laterally. Tiny nodule along the left major fissure laterally is stable. No pneumothorax or pleural effusion.  MEDIASTINUM/AXILLAE: No lymph node enlargement. Central airways are unremarkable. The heart size is normal.  CORONARY  ARTERY CALCIFICATION: Previous intervention (stents or CABG).                                                             IMPRESSION:   1.  No acute abnormality. No cause of chest pain is evident.  2.  A few small lung nodules without significant change.  Recommendations for one or multiple incidental lung nodules < 6mm :    Low risk patients: No routine follow-up.    High risk patients: Optional follow-up CT at 12 months; if unchanged, no further follow-up.    ECHO 5/4/2022  Interpretation Summary  Left ventricular size, wall motion and function are normal. The ejection  fraction is 55-60%.  There is borderline anterior, septal, and apical wall hypokinesis.  Normal right ventricle size and systolic function.  No hemodynamically significant valvular abnormalities on 2D or color flow  imaging.

## 2023-07-10 ENCOUNTER — LAB (OUTPATIENT)
Dept: LAB | Facility: CLINIC | Age: 53
End: 2023-07-10
Payer: COMMERCIAL

## 2023-07-10 ENCOUNTER — OFFICE VISIT (OUTPATIENT)
Dept: PHARMACY | Facility: CLINIC | Age: 53
End: 2023-07-10
Payer: COMMERCIAL

## 2023-07-10 VITALS — WEIGHT: 125 LBS | BODY MASS INDEX: 20.18 KG/M2

## 2023-07-10 DIAGNOSIS — E11.9 TYPE 2 DIABETES MELLITUS TREATED WITH INSULIN (H): Primary | ICD-10-CM

## 2023-07-10 DIAGNOSIS — Z79.4 TYPE 2 DIABETES MELLITUS TREATED WITH INSULIN (H): ICD-10-CM

## 2023-07-10 DIAGNOSIS — Z79.4 TYPE 2 DIABETES MELLITUS TREATED WITH INSULIN (H): Primary | ICD-10-CM

## 2023-07-10 DIAGNOSIS — J45.50 SEVERE PERSISTENT ASTHMA, UNSPECIFIED WHETHER COMPLICATED (H): ICD-10-CM

## 2023-07-10 DIAGNOSIS — E11.9 TYPE 2 DIABETES MELLITUS TREATED WITH INSULIN (H): ICD-10-CM

## 2023-07-10 LAB — HBA1C MFR BLD: 7.9 % (ref 0–5.6)

## 2023-07-10 PROCEDURE — 99606 MTMS BY PHARM EST 15 MIN: CPT | Performed by: PHARMACIST

## 2023-07-10 PROCEDURE — 99607 MTMS BY PHARM ADDL 15 MIN: CPT | Performed by: PHARMACIST

## 2023-07-10 PROCEDURE — 83036 HEMOGLOBIN GLYCOSYLATED A1C: CPT

## 2023-07-10 PROCEDURE — 36415 COLL VENOUS BLD VENIPUNCTURE: CPT

## 2023-07-10 NOTE — PATIENT INSTRUCTIONS
"Recommendations from today's MTM visit:                                                    Call Freestyle Gemma  if issues with Gemma 2 sensor arise: [phone].   Use Novolog 10 units twice daily before meals  Labs today: A1c    Follow-up: Return in about 2 months (around 9/13/2023) for with me.    It was great speaking with you today.  I value your experience and would be very thankful for your time in providing feedback in our clinic survey. In the next few days, you may receive an email or text message from [employer] with a link to a survey related to your  clinical pharmacist.\"     To schedule another MTM appointment, please call the clinic directly or you may call the MTM scheduling line at [phone] or toll-free at [phone].     My Clinical Pharmacist's contact information:                                                      Please feel free to contact me with any questions or concerns you have.      Malu Gil, PharmD, BCACP  Medication Therapy Management Pharmacist  "

## 2023-07-10 NOTE — PROGRESS NOTES
Medication Therapy Management (MTM) Encounter    ASSESSMENT:                            Medication Adherence/Access: Today was a focussed visit per patient request.   -- Recommend further assessment of adherence/comprehensive medication review at next visit.     1. Type 2 diabetes mellitus treated with insulin (H)  A1c rechecked today was elevated, not meeting goal <7%.  Tolerating max dose metformin.  Per discussion with patient today, not currently using bolus insulin as prescribed.  Reviewed with patient the importance of using NovoLog prior to meals rather than after and recommended twice daily administration before meals rather than once daily.  Additionally, provided patient phone number for freestyle silvana manufacture to contact in case issues with freestyle silvana sensor.  Directed patient to use the glucometer to monitor blood sugars at least every 8 hours, about 3-4 times daily.    2. Severe persistent asthma, unspecified whether complicated  Managed by pulmonologist/allergist. Recommend patient continue to follow closely with pulmonologist and use all medications and inhalers as prescribed.  Recommended review of current medications today, though patient declines.  If symptoms persist or worsen recommend follow-up with pulmonologist or PCP.     PLAN:                            1.  Use NovoLog 10 units twice daily before meals as directed  2.  Patient to use CGM to monitor blood sugars at least every 8 hours, about 3-4 times daily  3.  Rechecked A1c today    Follow-up: Return in about 2 months (around 9/13/2023) for with me.  Neuro 8/10; Allergy 8/24; PCP 10/5  SUBJECTIVE/OBJECTIVE:                          Kulwant Amin is a 53 year old female coming in for a follow-up visit. Patient was accompanied by daughter in law. Patient seen with [facility] . Today's visit is a follow-up MTM visit from 3/21/23. ID# [id number].     Reason for visit: MTM follow up.    Allergies/ADRs: Reviewed in chart  Past Medical  History: Reviewed in chart  Tobacco: She reports that she has never smoked. She has never been exposed to tobacco smoke. She has never used smokeless tobacco.    Medication Adherence/Access: Patient has a home health nurse who sets up medications in a weekly pillbox three times a day. Per patient and her daughter, she never misses any doses of her medications.  - Brings with medications today but not willing to discuss in detail since patient has a headache today. Prefers to review in more detail at a future visit.     Type 2 Diabetes:      Metformin 1000 mg twice daily with food    Lantus 40 units once daily at night    Novolog 10 units once daily after AM meal (prescribed twice daily before meals)  - Daughter in law helps patient with insulin administration.  Blood sugar monitoring: Freestyle Gemma CGM - soemtimes the sensor has been falling off before the 2 week period.    Hypoglycemia? None per CGM. Patient aware of how to correct for lows if needed.   Hyperglycemia? Unable to address today.  Diet/Exercise: Reports eating 2 meals per day, usually eats brown rice, beans, lentils, veggies  Aspirin 81mg daily for secondary prevention  Statin: Yes, atorvastatin    ACEi/ARB: No, not indicated, urine albumin 11/2022  Lab Results   Component Value Date    A1C 7.4 03/14/2023    A1C 8.6 11/30/2022    A1C 10.2 08/25/2022     Creatinine   Date Value Ref Range Status   06/20/2023 0.55 0.51 - 0.95 mg/dL Final   06/18/2015 0.7 0.5 - 1.0 mg/dL Final     Allergies/asthma/hypereosinophilia: Patient not willing to review current use of medications at todays visit. Currently prescribed loratadine 10mg daily, montelukast 10 mg daily at bedtime, Breo Ellipta 200/25mcg 1 puff daily, Spiriva 2.5 mcg 2 puffs daily, albuterol HFA daily as needed, and Dupixent 300 mg every 2 weeks injections. Also prescribed albuterol every 6 hours as needed.   - Pulmonologist: [facility], Tamra Mohr.   - Allergy & immunology: [facility], Dr. Elizabeth Marie.   - ED visit 6/20 for asthma exacerbation and given prednisone, patient requesting refill of prednisone today and states that she still has a cough.     Today's Vitals: Wt 125 lb (56.7 kg)   BMI 20.18 kg/m    ----------------      I spent 20 minutes with this patient today. All changes were made via collaborative practice agreement with Adrien Cardoza MD. A copy of the visit note was provided to the patient's provider(s).    A summary of these recommendations was given to the patient.    Malu Gil, PharmD, BCACP  Medication Therapy Management Pharmacist     Medication Therapy Recommendations  Type 2 diabetes mellitus treated with insulin (H)    Current Medication: insulin aspart (NOVOLOG PEN) 100 UNIT/ML pen   Rationale: Does not understand instructions - Adherence - Adherence   Recommendation: Provide Adherence Intervention   Status: Patient Agreed - Adherence/Education

## 2023-07-18 DIAGNOSIS — Z91.09 ENVIRONMENTAL ALLERGIES: ICD-10-CM

## 2023-07-18 RX ORDER — LORATADINE 10 MG/1
TABLET ORAL
Qty: 30 TABLET | Refills: 1 | Status: SHIPPED | OUTPATIENT
Start: 2023-07-18 | End: 2023-09-14

## 2023-07-19 DIAGNOSIS — I63.50 CEREBROVASCULAR ACCIDENT OF RIGHT PONTINE STRUCTURE (H): ICD-10-CM

## 2023-07-19 DIAGNOSIS — Z53.9 DIAGNOSIS NOT YET DEFINED: Primary | ICD-10-CM

## 2023-07-19 PROCEDURE — G0179 MD RECERTIFICATION HHA PT: HCPCS | Performed by: FAMILY MEDICINE

## 2023-07-19 PROCEDURE — 99207 PR MD RECERTIFICATION HHA PT: CPT | Performed by: FAMILY MEDICINE

## 2023-07-19 RX ORDER — CLOPIDOGREL BISULFATE 75 MG/1
75 TABLET ORAL DAILY
Qty: 90 TABLET | Refills: 3 | OUTPATIENT
Start: 2023-07-19

## 2023-07-19 NOTE — TELEPHONE ENCOUNTER
Refill request for: clopidogrel 75mg   Please DENY. This was discontinued by Dr. Hopson at 2/17/22 appt.  Pt no longer taking.    Urvashi Saha LPN on 7/19/2023 at 10:49 AM

## 2023-07-24 ENCOUNTER — APPOINTMENT (OUTPATIENT)
Dept: CT IMAGING | Facility: HOSPITAL | Age: 53
End: 2023-07-24
Attending: STUDENT IN AN ORGANIZED HEALTH CARE EDUCATION/TRAINING PROGRAM
Payer: COMMERCIAL

## 2023-07-24 ENCOUNTER — HOSPITAL ENCOUNTER (OUTPATIENT)
Facility: HOSPITAL | Age: 53
Setting detail: OBSERVATION
Discharge: HOME | End: 2023-07-26
Attending: STUDENT IN AN ORGANIZED HEALTH CARE EDUCATION/TRAINING PROGRAM | Admitting: STUDENT IN AN ORGANIZED HEALTH CARE EDUCATION/TRAINING PROGRAM
Payer: COMMERCIAL

## 2023-07-24 ENCOUNTER — TELEPHONE (OUTPATIENT)
Dept: PULMONOLOGY | Facility: CLINIC | Age: 53
End: 2023-07-24

## 2023-07-24 ENCOUNTER — HOSPITAL ENCOUNTER (OUTPATIENT)
Dept: RADIOLOGY | Facility: HOSPITAL | Age: 53
Discharge: HOME | End: 2023-07-24
Attending: NURSE PRACTITIONER | Admitting: NURSE PRACTITIONER
Payer: COMMERCIAL

## 2023-07-24 ENCOUNTER — OFFICE VISIT (OUTPATIENT)
Dept: PULMONOLOGY | Facility: CLINIC | Age: 53
End: 2023-07-24
Payer: COMMERCIAL

## 2023-07-24 VITALS
HEART RATE: 107 BPM | WEIGHT: 123.2 LBS | OXYGEN SATURATION: 99 % | DIASTOLIC BLOOD PRESSURE: 64 MMHG | BODY MASS INDEX: 19.89 KG/M2 | SYSTOLIC BLOOD PRESSURE: 120 MMHG

## 2023-07-24 DIAGNOSIS — J45.51 SEVERE PERSISTENT ASTHMA WITH EXACERBATION (H): Primary | ICD-10-CM

## 2023-07-24 DIAGNOSIS — R07.9 CHEST PAIN, UNSPECIFIED TYPE: ICD-10-CM

## 2023-07-24 DIAGNOSIS — J45.51 SEVERE PERSISTENT ASTHMA WITH EXACERBATION (H): ICD-10-CM

## 2023-07-24 LAB
ALBUMIN SERPL BCG-MCNC: 4.2 G/DL (ref 3.5–5.2)
ALP SERPL-CCNC: 118 U/L (ref 35–104)
ALT SERPL W P-5'-P-CCNC: 40 U/L (ref 0–50)
ANION GAP SERPL CALCULATED.3IONS-SCNC: 9 MMOL/L (ref 7–15)
AST SERPL W P-5'-P-CCNC: 46 U/L (ref 0–45)
BASOPHILS # BLD AUTO: 0.1 10E3/UL (ref 0–0.2)
BASOPHILS NFR BLD AUTO: 0 %
BILIRUB SERPL-MCNC: 0.3 MG/DL
BUN SERPL-MCNC: 12.8 MG/DL (ref 6–20)
CALCIUM SERPL-MCNC: 9.8 MG/DL (ref 8.6–10)
CHLORIDE SERPL-SCNC: 102 MMOL/L (ref 98–107)
CREAT SERPL-MCNC: 0.6 MG/DL (ref 0.51–0.95)
DEPRECATED HCO3 PLAS-SCNC: 26 MMOL/L (ref 22–29)
EOSINOPHIL # BLD AUTO: 1 10E3/UL (ref 0–0.7)
EOSINOPHIL NFR BLD AUTO: 8 %
ERYTHROCYTE [DISTWIDTH] IN BLOOD BY AUTOMATED COUNT: 15.5 % (ref 10–15)
GFR SERPL CREATININE-BSD FRML MDRD: >90 ML/MIN/1.73M2
GLUCOSE SERPL-MCNC: 215 MG/DL (ref 70–99)
HCT VFR BLD AUTO: 39.8 % (ref 35–47)
HGB BLD-MCNC: 12.5 G/DL (ref 11.7–15.7)
HOLD SPECIMEN: NORMAL
IMM GRANULOCYTES # BLD: 0.1 10E3/UL
IMM GRANULOCYTES NFR BLD: 0 %
LIPASE SERPL-CCNC: 221 U/L (ref 13–60)
LYMPHOCYTES # BLD AUTO: 2.4 10E3/UL (ref 0.8–5.3)
LYMPHOCYTES NFR BLD AUTO: 20 %
MCH RBC QN AUTO: 25.6 PG (ref 26.5–33)
MCHC RBC AUTO-ENTMCNC: 31.4 G/DL (ref 31.5–36.5)
MCV RBC AUTO: 82 FL (ref 78–100)
MONOCYTES # BLD AUTO: 0.7 10E3/UL (ref 0–1.3)
MONOCYTES NFR BLD AUTO: 5 %
NEUTROPHILS # BLD AUTO: 8 10E3/UL (ref 1.6–8.3)
NEUTROPHILS NFR BLD AUTO: 67 %
NRBC # BLD AUTO: 0 10E3/UL
NRBC BLD AUTO-RTO: 0 /100
NT-PROBNP SERPL-MCNC: <36 PG/ML (ref 0–900)
PLATELET # BLD AUTO: 231 10E3/UL (ref 150–450)
POTASSIUM SERPL-SCNC: 4.5 MMOL/L (ref 3.4–5.3)
PROT SERPL-MCNC: 7.1 G/DL (ref 6.4–8.3)
RBC # BLD AUTO: 4.88 10E6/UL (ref 3.8–5.2)
SODIUM SERPL-SCNC: 137 MMOL/L (ref 136–145)
TROPONIN T SERPL HS-MCNC: <6 NG/L
TROPONIN T SERPL HS-MCNC: <6 NG/L
WBC # BLD AUTO: 12 10E3/UL (ref 4–11)

## 2023-07-24 PROCEDURE — 82962 GLUCOSE BLOOD TEST: CPT

## 2023-07-24 PROCEDURE — 93005 ELECTROCARDIOGRAM TRACING: CPT | Performed by: STUDENT IN AN ORGANIZED HEALTH CARE EDUCATION/TRAINING PROGRAM

## 2023-07-24 PROCEDURE — 80053 COMPREHEN METABOLIC PANEL: CPT | Performed by: STUDENT IN AN ORGANIZED HEALTH CARE EDUCATION/TRAINING PROGRAM

## 2023-07-24 PROCEDURE — 85025 COMPLETE CBC W/AUTO DIFF WBC: CPT | Performed by: STUDENT IN AN ORGANIZED HEALTH CARE EDUCATION/TRAINING PROGRAM

## 2023-07-24 PROCEDURE — 250N000011 HC RX IP 250 OP 636: Performed by: STUDENT IN AN ORGANIZED HEALTH CARE EDUCATION/TRAINING PROGRAM

## 2023-07-24 PROCEDURE — 71046 X-RAY EXAM CHEST 2 VIEWS: CPT

## 2023-07-24 PROCEDURE — 83880 ASSAY OF NATRIURETIC PEPTIDE: CPT | Performed by: STUDENT IN AN ORGANIZED HEALTH CARE EDUCATION/TRAINING PROGRAM

## 2023-07-24 PROCEDURE — 250N000013 HC RX MED GY IP 250 OP 250 PS 637: Performed by: FAMILY MEDICINE

## 2023-07-24 PROCEDURE — 93005 ELECTROCARDIOGRAM TRACING: CPT | Mod: 76

## 2023-07-24 PROCEDURE — G0378 HOSPITAL OBSERVATION PER HR: HCPCS

## 2023-07-24 PROCEDURE — 250N000009 HC RX 250: Performed by: FAMILY MEDICINE

## 2023-07-24 PROCEDURE — 83690 ASSAY OF LIPASE: CPT | Performed by: STUDENT IN AN ORGANIZED HEALTH CARE EDUCATION/TRAINING PROGRAM

## 2023-07-24 PROCEDURE — 94640 AIRWAY INHALATION TREATMENT: CPT

## 2023-07-24 PROCEDURE — 99214 OFFICE O/P EST MOD 30 MIN: CPT | Performed by: NURSE PRACTITIONER

## 2023-07-24 PROCEDURE — 258N000003 HC RX IP 258 OP 636: Performed by: STUDENT IN AN ORGANIZED HEALTH CARE EDUCATION/TRAINING PROGRAM

## 2023-07-24 PROCEDURE — 74174 CTA ABD&PLVS W/CONTRAST: CPT

## 2023-07-24 PROCEDURE — 99285 EMERGENCY DEPT VISIT HI MDM: CPT | Mod: 25

## 2023-07-24 PROCEDURE — 36415 COLL VENOUS BLD VENIPUNCTURE: CPT | Performed by: STUDENT IN AN ORGANIZED HEALTH CARE EDUCATION/TRAINING PROGRAM

## 2023-07-24 PROCEDURE — 99223 1ST HOSP IP/OBS HIGH 75: CPT | Performed by: FAMILY MEDICINE

## 2023-07-24 PROCEDURE — 96361 HYDRATE IV INFUSION ADD-ON: CPT

## 2023-07-24 PROCEDURE — 84484 ASSAY OF TROPONIN QUANT: CPT | Performed by: FAMILY MEDICINE

## 2023-07-24 PROCEDURE — 999N000157 HC STATISTIC RCP TIME EA 10 MIN

## 2023-07-24 PROCEDURE — 96374 THER/PROPH/DIAG INJ IV PUSH: CPT | Mod: 59

## 2023-07-24 PROCEDURE — G0463 HOSPITAL OUTPT CLINIC VISIT: HCPCS | Mod: 25

## 2023-07-24 PROCEDURE — 84484 ASSAY OF TROPONIN QUANT: CPT | Performed by: STUDENT IN AN ORGANIZED HEALTH CARE EDUCATION/TRAINING PROGRAM

## 2023-07-24 PROCEDURE — 36415 COLL VENOUS BLD VENIPUNCTURE: CPT | Performed by: FAMILY MEDICINE

## 2023-07-24 PROCEDURE — 250N000009 HC RX 250: Performed by: STUDENT IN AN ORGANIZED HEALTH CARE EDUCATION/TRAINING PROGRAM

## 2023-07-24 RX ORDER — MORPHINE SULFATE 2 MG/ML
2 INJECTION, SOLUTION INTRAMUSCULAR; INTRAVENOUS
Status: DISCONTINUED | OUTPATIENT
Start: 2023-07-24 | End: 2023-07-26 | Stop reason: HOSPADM

## 2023-07-24 RX ORDER — IOPAMIDOL 755 MG/ML
90 INJECTION, SOLUTION INTRAVASCULAR ONCE
Status: COMPLETED | OUTPATIENT
Start: 2023-07-24 | End: 2023-07-24

## 2023-07-24 RX ORDER — PREDNISONE 20 MG/1
40 TABLET ORAL DAILY
Qty: 14 TABLET | Refills: 0 | Status: SHIPPED | OUTPATIENT
Start: 2023-07-24 | End: 2023-07-31

## 2023-07-24 RX ORDER — METHYLPREDNISOLONE SODIUM SUCCINATE 125 MG/2ML
125 INJECTION, POWDER, LYOPHILIZED, FOR SOLUTION INTRAMUSCULAR; INTRAVENOUS ONCE
Status: COMPLETED | OUTPATIENT
Start: 2023-07-24 | End: 2023-07-24

## 2023-07-24 RX ORDER — DEXTROSE MONOHYDRATE 25 G/50ML
25-50 INJECTION, SOLUTION INTRAVENOUS
Status: DISCONTINUED | OUTPATIENT
Start: 2023-07-24 | End: 2023-07-26 | Stop reason: HOSPADM

## 2023-07-24 RX ORDER — NITROGLYCERIN 0.4 MG/1
0.4 TABLET SUBLINGUAL EVERY 5 MIN PRN
Status: DISCONTINUED | OUTPATIENT
Start: 2023-07-24 | End: 2023-07-26 | Stop reason: HOSPADM

## 2023-07-24 RX ORDER — ONDANSETRON 4 MG/1
4 TABLET, ORALLY DISINTEGRATING ORAL EVERY 6 HOURS PRN
Status: DISCONTINUED | OUTPATIENT
Start: 2023-07-24 | End: 2023-07-26 | Stop reason: HOSPADM

## 2023-07-24 RX ORDER — IPRATROPIUM BROMIDE AND ALBUTEROL SULFATE 2.5; .5 MG/3ML; MG/3ML
3 SOLUTION RESPIRATORY (INHALATION)
Status: DISCONTINUED | OUTPATIENT
Start: 2023-07-24 | End: 2023-07-24

## 2023-07-24 RX ORDER — CYCLOBENZAPRINE HCL 5 MG
5 TABLET ORAL 3 TIMES DAILY PRN
COMMUNITY
End: 2024-01-23

## 2023-07-24 RX ORDER — IPRATROPIUM BROMIDE AND ALBUTEROL SULFATE 2.5; .5 MG/3ML; MG/3ML
3 SOLUTION RESPIRATORY (INHALATION)
Status: DISCONTINUED | OUTPATIENT
Start: 2023-07-25 | End: 2023-07-26 | Stop reason: HOSPADM

## 2023-07-24 RX ORDER — IPRATROPIUM BROMIDE AND ALBUTEROL SULFATE 2.5; .5 MG/3ML; MG/3ML
3 SOLUTION RESPIRATORY (INHALATION) ONCE
Status: COMPLETED | OUTPATIENT
Start: 2023-07-24 | End: 2023-07-24

## 2023-07-24 RX ORDER — ASPIRIN 81 MG/1
324 TABLET, CHEWABLE ORAL ONCE
Status: COMPLETED | OUTPATIENT
Start: 2023-07-24 | End: 2023-07-24

## 2023-07-24 RX ORDER — NICOTINE POLACRILEX 4 MG
15-30 LOZENGE BUCCAL
Status: DISCONTINUED | OUTPATIENT
Start: 2023-07-24 | End: 2023-07-26 | Stop reason: HOSPADM

## 2023-07-24 RX ORDER — IBUPROFEN 600 MG/1
600 TABLET, FILM COATED ORAL EVERY 6 HOURS PRN
COMMUNITY
End: 2024-01-22

## 2023-07-24 RX ORDER — ALBUTEROL SULFATE 0.83 MG/ML
2.5 SOLUTION RESPIRATORY (INHALATION) ONCE
Status: COMPLETED | OUTPATIENT
Start: 2023-07-24 | End: 2023-07-24

## 2023-07-24 RX ORDER — ONDANSETRON 2 MG/ML
4 INJECTION INTRAMUSCULAR; INTRAVENOUS EVERY 6 HOURS PRN
Status: DISCONTINUED | OUTPATIENT
Start: 2023-07-24 | End: 2023-07-26 | Stop reason: HOSPADM

## 2023-07-24 RX ORDER — FAMOTIDINE 20 MG/1
20 TABLET, FILM COATED ORAL 2 TIMES DAILY
COMMUNITY
End: 2023-09-13

## 2023-07-24 RX ADMIN — METHYLPREDNISOLONE SODIUM SUCCINATE 125 MG: 125 INJECTION, POWDER, FOR SOLUTION INTRAMUSCULAR; INTRAVENOUS at 18:31

## 2023-07-24 RX ADMIN — ASPIRIN 81 MG CHEWABLE TABLET 324 MG: 81 TABLET CHEWABLE at 22:23

## 2023-07-24 RX ADMIN — ALBUTEROL SULFATE 2.5 MG: 2.5 SOLUTION RESPIRATORY (INHALATION) at 19:26

## 2023-07-24 RX ADMIN — SODIUM CHLORIDE, POTASSIUM CHLORIDE, SODIUM LACTATE AND CALCIUM CHLORIDE 1000 ML: 600; 310; 30; 20 INJECTION, SOLUTION INTRAVENOUS at 18:33

## 2023-07-24 RX ADMIN — IPRATROPIUM BROMIDE AND ALBUTEROL SULFATE 3 ML: .5; 3 SOLUTION RESPIRATORY (INHALATION) at 22:33

## 2023-07-24 RX ADMIN — IPRATROPIUM BROMIDE AND ALBUTEROL SULFATE 3 ML: .5; 3 SOLUTION RESPIRATORY (INHALATION) at 17:05

## 2023-07-24 RX ADMIN — IOPAMIDOL 90 ML: 755 INJECTION, SOLUTION INTRAVENOUS at 19:22

## 2023-07-24 ASSESSMENT — ACTIVITIES OF DAILY LIVING (ADL)
ADLS_ACUITY_SCORE: 35
ADLS_ACUITY_SCORE: 35
ADLS_ACUITY_SCORE: 33
ADLS_ACUITY_SCORE: 35

## 2023-07-24 ASSESSMENT — ASTHMA QUESTIONNAIRES
QUESTION_3 LAST FOUR WEEKS HOW OFTEN DID YOUR ASTHMA SYMPTOMS (WHEEZING, COUGHING, SHORTNESS OF BREATH, CHEST TIGHTNESS OR PAIN) WAKE YOU UP AT NIGHT OR EARLIER THAN USUAL IN THE MORNING: FOUR OR MORE NIGHTS A WEEK
ACT_TOTALSCORE: 7
QUESTION_2 LAST FOUR WEEKS HOW OFTEN HAVE YOU HAD SHORTNESS OF BREATH: MORE THAN ONCE A DAY
QUESTION_5 LAST FOUR WEEKS HOW WOULD YOU RATE YOUR ASTHMA CONTROL: SOMEWHAT CONTROLLED
ACT_TOTALSCORE: 7
QUESTION_4 LAST FOUR WEEKS HOW OFTEN HAVE YOU USED YOUR RESCUE INHALER OR NEBULIZER MEDICATION (SUCH AS ALBUTEROL): THREE OR MORE TIMES PER DAY
QUESTION_1 LAST FOUR WEEKS HOW MUCH OF THE TIME DID YOUR ASTHMA KEEP YOU FROM GETTING AS MUCH DONE AT WORK, SCHOOL OR AT HOME: ALL OF THE TIME

## 2023-07-24 NOTE — ED TRIAGE NOTES
Pt reports CP, sob and wheezing sicne last night.  Pt has hx of asthma and has been using her inhaler, lat time one hour PTA.  Per pt family member pt also has some dizziness and a previous hx of a stroke over a karla ago.  Dizziness has been going on for a long time.      Triage Assessment       Row Name 07/24/23 [id number]       Triage Assessment (Adult)    Airway WDL WDL       Respiratory WDL    Respiratory WDL X;rhythm/pattern    Rhythm/Pattern, Respiratory shortness of breath       Skin Circulation/Temperature WDL    Skin Circulation/Temperature WDL WDL       Cardiac WDL    Cardiac WDL X;chest pain       Chest Pain Assessment    Chest Pain Location substernal       Peripheral/Neurovascular WDL    Peripheral Neurovascular WDL WDL       Cognitive/Neuro/Behavioral WDL    Cognitive/Neuro/Behavioral WDL WDL

## 2023-07-24 NOTE — TELEPHONE ENCOUNTER
[facility] Call Center    Phone Message    May a detailed message be left on voicemail: yes     Reason for Call: Symptoms or Concerns     If patient has red-flag symptoms, warm transfer to triage line    Current symptom or concern: Per Patients Daughter, Luba states patient has been coughing and wheezing for a week and that it has been getting worse. Patient is scheduled for first available with Dr. Duong on 10/20/2023. Luba would like to get a call back. Please advise.     Symptoms have been present for:  1 week(s)    Has patient previously been seen for this? No    By n/a    Date: n/a    Are there any new or worsening symptoms? Yes: Patients Wheezing and coughing has been getting worse.        Action Taken: Message routed to:  [facility] Lung    Travel Screening: Not Applicable

## 2023-07-24 NOTE — ED PROVIDER NOTES
Emergency Department Encounter         FINAL IMPRESSION:  Shortness of breath, chest pain        ED COURSE AND MEDICAL DECISION MAKING     Patient is a Georgian speaking 53-year-old female here from home with her daughter who is helping translate with worsening chest pain as well as shortness of breath.  Shortness of breath has been present for last couple days however the chest pain is worse in the last day or so.  No fevers, chills, nausea vomiting.  No leg swelling.  No hemoptysis.  No abdominal pain.  No fevers.    On arrival patient is dyspneic.  Has wheezing bilaterally.  Mildly tachycardic.   abdomen benign.  No leg swelling.  Plan for cardiopulmonary evaluation.    See below, discussion with cardiology initially because of some concerning EKG findings however troponin is negative which is reassuring.  Plan for CT PE as patient continues to complain of shortness of breath and chest pain.  Nebulizers as well as steroids given.    - Labs otherwise reassuring at this point.  Continued chest discomfort as well as shortness of breath.  Because of her multiple comorbidities, we will admit her for further monitoring and cardiopulmonary monitoring.  - I did did personally review the CT PE, did not see any central findings however awaiting radiology formal read.        ED Course as of 07/24/23 1944 Mon Jul 24, 2023   1649 EKG #1 showing sinus rhythm, rate of 97, patient with inversion in V2, minimal elevation in lead I, lead II, aVF which is subtle, no other depressions, no old EKGs for comparison.  Plan for repeat EKG in 15 minutes.       1659 EKG #2 showing again mild elevation in the inferior leads specifically lead II, lead aVF.  Inversion that was initially seen in V2 now has flipped upwards.  No depressions.  QTc remained stable.   1756 Trope negative.  Plan to aggressively treat COPD/pulmonary complaint.       4:55 PM Met with and introduced myself to the patient. Discussed history and plan of care.  5:19 PM  Spoke with Cardiology regarding patient plan of care.  5:27 PM Spoke with Dr. Palomares, Cardiology. She reports that she will touch base with Interventional Cardiology and will call back if the plan changes.  7:42 PM Rechecked patient.     Medical Decision Making    History:  Supplemental history from: Family Member/Significant Other  External Record(s) reviewed: Outpatient Record: Review of Pulmonary Clinic visit on 7/24/23    Work Up:  Chart documentation includes differential considered and any EKGs or imaging independently interpreted by provider, where specified.  In additional to work up documented, I considered the following work up: Documented in chart, if applicable.    External consultation:  Discussion of management with another provider: Cardiology    Complicating factors:  Care impacted by chronic illness: Chronic Lung Disease, Diabetes, Hyperlipidemia, and Hypertension  Care affected by social determinants of health: N/A    Disposition considerations: Admit.      EKG  EKG #1 showing sinus rhythm, rate of 97, patient with inversion in V2, minimal elevation in lead I, lead II, aVF which is subtle, no other depressions, no old EKGs for comparison.  Plan for repeat EKG in 15 minutes.    EKG #2 showing again mild elevation in the inferior leads specifically lead II, lead aVF.  Inversion that was initially seen in V2 now has flipped upwards.  No depressions.  QTc remained stable.    Critical Care     Performed by: Sulaiman Ramirez or    Authorized by: Sulaiman Ramirez  Total critical care time:  minutes  Critical care was necessary to treat or prevent imminent or life-threatening deterioration of the following conditions:   Critical care was time spent personally by me on the following activities: development of treatment plan with patient or surrogate, discussions with consultants, examination of patient, evaluation of patient's response to treatment, obtaining history from patient or surrogate, ordering and performing  treatments and interventions, ordering and review of laboratory studies, ordering and review of radiographic studies, re-evaluation of patient's condition and monitoring for potential decompensation.  Critical care time was exclusive of separately billable procedures and treating other patients.'    At the conclusion of the encounter I discussed the results of all the tests and the disposition. The questions were answered. The patient or family acknowledged understanding and was agreeable with the care plan.                  MEDICATIONS GIVEN IN THE EMERGENCY DEPARTMENT:  Medications   ipratropium - albuterol 0.5 mg/2.5 mg/3 mL (DUONEB) neb solution 3 mL (3 mLs Nebulization $Given 7/24/23 1705)   lactated ringers BOLUS 1,000 mL (0 mLs Intravenous Stopped 7/24/23 1927)   methylPREDNISolone sodium succinate (solu-MEDROL) injection 125 mg (125 mg Intravenous $Given 7/24/23 1831)   albuterol (PROVENTIL) neb solution 2.5 mg (2.5 mg Nebulization $Given 7/24/23 1926)   iopamidol (ISOVUE-370) solution 90 mL (90 mLs Intravenous $Given 7/24/23 1922)       NEW PRESCRIPTIONS STARTED AT TODAY'S ED VISIT:  New Prescriptions    No medications on file       HPI     Patient information obtained from: Patient    Use of Interpretor: Yes (In Person) - Language Bengali    Kulwant Amin is a 53 year old female with a pertinent history of hypertension, coronary artery disease, asthma, COPD, TB, hyperlipidemia, and T2DM who presents to this ED via walk in for evaluation of chest pain and shortness of breath.    Per chart review, the patient presented to [facility] on 7/24/23 for a Pulmonary follow-up for evaluation of an acute COPD exacerbation. Pulmonary function testing showed reduced FEV1 and FVC, but ratio was within normal limit. Planned to start on prednisone 40 mg for 7 days. Lung sounds were clear, but family requested xray. This showed calcified granuloma in the right mid lung. No hydropneumothorax. Lungs were  clear. No sign of acute disease. Patient discharged with recommended follow-up with Dr. Duong on 10/20/23.    The patient reports one week of a cough, shortness of breath, fever, headache, and wheezing. States that her breathing has since worsened. She also notes two days of chest pain that radiates to her back that only occurs when she coughs.     Denies abdominal pain, leg swelling, urinary or bowel changes, or any other complaints at this time.    REVIEW OF SYSTEMS:  Review of Systems   Constitutional: Negative for malaise. Positive for fever.  HEENT: Negative runny nose, sore throat, ear pain, neck pain  Respiratory: Negative for congestion. Positive for shortness of breath, wheezing, and cough.  Cardiovascular: Negative for leg edema. Positive for chest pain.  Gastrointestinal: Negative for abdominal distention, abdominal pain, constipation, vomiting, nausea, diarrhea  Genitourinary: Negative for dysuria and hematuria.   Integument: Negative for rash, skin breakdown  Neurological: Negative for paresthesias. Positive for headache.  Musculoskeletal: Negative for joint pain, joint swelling. Positive for back pain.    All other systems reviewed and are negative.    MEDICAL HISTORY     Past Medical History:   Diagnosis Date    Acute asthma exacerbation 01/06/2020    Anxiety     Arthritis     Asthma exacerbation 11/19/2015    Chronic obstructive pulmonary disease, unspecified COPD type (H)     COPD (chronic obstructive pulmonary disease) (H)     Coronary artery disease due to lipid rich plaque     Depression     Epigastric pain 12/15/2021    Essential hypertension     GERD (gastroesophageal reflux disease)     Infection due to 2019 novel coronavirus 01/03/2022    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    Microcytic anemia 11/17/2019    S/P coronary artery stent placement 02/02/2018    TB lung, latent        Past Surgical History:   Procedure Laterality Date    CORONARY STENT PLACEMENT  2018    CV CORONARY ANGIOGRAM N/A  1/25/2018    Procedure: Coronary Angiogram;  Surgeon: Angelo Serrano MD;  Location: [facility] Cath Lab;  Service:     CV CORONARY ANGIOGRAM N/A 5/5/2022    Procedure: Coronary Angiogram;  Surgeon: Irwin Cano MD;  Location: [facility] CATH LAB CV    CV CORONARY ANGIOGRAM  5/5/2022    Procedure: ;  Surgeon: Irwin Cano MD;  Location: [facility] CATH LAB CV    [place] ESOPHAGOGASTRODUODENOSCOPY TRANSORAL DIAGNOSTIC N/A 12/10/2018    Procedure: ESOPHAGOGASTRODUODENOSCOPY (EGD);  Surgeon: Eddie Renteria MD;  Location: [facility];  Service: Gastroenterology    [place] ESOPHAGOGASTRODUODENOSCOPY TRANSORAL DIAGNOSTIC N/A 12/3/2020    Procedure: ESOPHAGOGASTRODUODENOSCOPY (EGD) with biospies ;  Surgeon: Avi Crow MD;  Location: [facility];  Service: Gastroenterology    [facility] COLONOSCOPY W/WO BRUSH/WASH N/A 12/10/2018    Procedure: COLONOSCOPY with polypectomy using biopsy forceps;  Surgeon: Eddie Renteria MD;  Location: [facility];  Service: Gastroenterology       Social History     Tobacco Use    Smoking status: Never     Passive exposure: Never    Smokeless tobacco: Never    Tobacco comments:     No passive exposure   Vaping Use    Vaping Use: Never used   Substance Use Topics    Alcohol use: No    Drug use: No       acetaminophen (TYLENOL) 500 MG tablet  albuterol (PROAIR HFA/PROVENTIL HFA/VENTOLIN HFA) 108 (90 Base) MCG/ACT inhaler  albuterol (PROVENTIL) (2.5 MG/3ML) 0.083% neb solution  ALLERGY RELIEF 10 MG tablet  amitriptyline (ELAVIL) 50 MG tablet  ASPIRIN LOW DOSE 81 MG EC tablet  atorvastatin (LIPITOR) 80 MG tablet  B-D U/F 31G X 8 MM insulin pen needle  calcium carbonate (TUMS) 500 MG chewable tablet  Continuous Blood Gluc Sensor (FREESTYLE MONICA 2 SENSOR) MISC  CONTOUR NEXT TEST test strip  dextromethorphan (DELSYM) 30 MG/5ML liquid  diclofenac (VOLTAREN) 1 % topical gel  docusate sodium (COLACE) 100 MG capsule  dupilumab (DUPIXENT) 300 MG/2ML prefilled pen  FLUoxetine (PROZAC) 10 MG  capsule  fluticasone-vilanterol (BREO ELLIPTA) 200-25 MCG/ACT inhaler  gabapentin (NEURONTIN) 300 MG capsule  Global Inject Ease Lancets 30G MISC  hydrochlorothiazide (MICROZIDE) 12.5 MG capsule  hydrocortisone (CORTAID) 1 % external cream  insulin aspart (NOVOLOG PEN) 100 UNIT/ML pen  insulin glargine (LANTUS PEN) 100 UNIT/ML pen  insulin pen needle (32G X 4 MM) 32G X 4 MM miscellaneous  ipratropium - albuterol 0.5 mg/2.5 mg/3 mL (DUONEB) 0.5-2.5 (3) MG/3ML neb solution  lidocaine (XYLOCAINE) 5 % external ointment  meclizine (ANTIVERT) 12.5 MG tablet  metFORMIN (GLUCOPHAGE) 1000 MG tablet  metoprolol tartrate (LOPRESSOR) 25 MG tablet  montelukast (SINGULAIR) 10 MG tablet  nystatin (MYCOSTATIN) 069110 UNIT/ML suspension  omeprazole (PRILOSEC) 40 MG DR capsule  Polyethylene Glycol 3350 (PEG 3350) 17 GM/SCOOP POWD  polyvinyl alcohol (ARTIFICIAL TEARS) 1.4 % ophthalmic solution  predniSONE (DELTASONE) 20 MG tablet  sucralfate (CARAFATE) 1 GM/10ML suspension  tiotropium (SPIRIVA RESPIMAT) 2.5 MCG/ACT inhaler            PHYSICAL EXAM     /76   Pulse 98   Temp 97  F (36.1  C) (Temporal)   Resp 23   Wt 60.8 kg (134 lb)   SpO2 100%   BMI 21.63 kg/m        PHYSICAL EXAM:     General: Patient appears well, nontoxic, comfortable  HEENT: Moist mucous membranes,  No head trauma.    Cardiovascular: Normal rate, normal rhythm, no extremity edema.  No appreciable murmur.  Respiratory: Mild respiratory distress.  Wheezing bilaterally.  Abdominal: Soft, nontender, nondistended, no palpable masses, no guarding, no rebound  Musculoskeletal: Full range of motion of joints, no deformities appreciated.  Neurological: Alert and oriented, grossly neurologically intact.  Psychological: Normal affect and mood.  Integument: No rashes appreciated          RESULTS       Labs Ordered and Resulted from Time of ED Arrival to Time of ED Departure   COMPREHENSIVE METABOLIC PANEL - Abnormal       Result Value    Sodium 137      Potassium  4.5      Chloride 102      Carbon Dioxide (CO2) 26      Anion Gap 9      Urea Nitrogen 12.8      Creatinine 0.60      Calcium 9.8      Glucose 215 (*)     Alkaline Phosphatase 118 (*)     AST 46 (*)     ALT 40      Protein Total 7.1      Albumin 4.2      Bilirubin Total 0.3      GFR Estimate >90     CBC WITH PLATELETS AND DIFFERENTIAL - Abnormal    WBC Count 12.0 (*)     RBC Count 4.88      Hemoglobin 12.5      Hematocrit 39.8      MCV 82      MCH 25.6 (*)     MCHC 31.4 (*)     RDW 15.5 (*)     Platelet Count 231      % Neutrophils 67      % Lymphocytes 20      % Monocytes 5      % Eosinophils 8      % Basophils 0      % Immature Granulocytes 0      NRBCs per 100 WBC 0      Absolute Neutrophils 8.0      Absolute Lymphocytes 2.4      Absolute Monocytes 0.7      Absolute Eosinophils 1.0 (*)     Absolute Basophils 0.1      Absolute Immature Granulocytes 0.1      Absolute NRBCs 0.0     LIPASE - Abnormal    Lipase 221 (*)    TROPONIN T, HIGH SENSITIVITY - Normal    Troponin T, High Sensitivity <6     NT PROBNP INPATIENT - Normal    N terminal Pro BNP Inpatient <36         CTA Chest Abdomen Pelvis w Contrast   Final Result   IMPRESSION:   1.  No evidence for aortic dissection or aneurysm.   2.  Mosaic attenuation pattern both lower lung fields suggesting air trapping or small airways/vessel disease. Otherwise no acute pulmonary disease.   3.  No acute disease within the abdomen or pelvis.                           PROCEDURES:  Procedures:  Procedures       I, Sana Ames am serving as a scribe to document services personally performed by Sulaiman Ramirez DO, based on my observations and the provider's statements to me.  I, Sulaiman Ramirez DO, attest that Sana Ames is acting in a scribe capacity, has observed my performance of the services and has documented them in accordance with my direction.    Sulaiman Ramirez DO  Emergency Medicine  [facility] EMERGENCY DEPARTMENT       Sulaiman Ramirez DO  07/24/23  2053

## 2023-07-24 NOTE — PROGRESS NOTES
Pulmonary Clinic Follow-Up        Assessment/Plan:     53 year old woman with history of asthma, organic fuel exposure in the home - presents today for acute exacerbation.      Severe persistent asthma  Hypereosinophilia  Pulmonary function testing shows reduced FEV1 and FVC, but ratio is WNL.  No lung volumes were done.  Results are questionable due to patients inability to perform ATS maneuvers per ATS standards.  She if followed by Dr Marie and is currently on Dupixent injections.  She presents today with one week of exacerbation symptoms, increase in cough, wheezing.  LS are CTA bilaterally, oxygen saturations 99% on RA, but does have strong cough throughout exam.  Plan:  - start prednisone 40mg x7 days.   - daughter-in-law is insistent on chest x-ray today, despite clear lung sounds and no fevers. I will message her with results.  - continue Breo Ellipta (fluticasone/vilanterol) 200/25, one inhalation daily, rinse/gargle after use.  - continue Spiriva (tiotropium) one inhalation daily.  - continue to use 3 times daily albuterol nebs.  - continue Singulair daily.  - As needed albuterol rescue.  - Dupixent twice a month prescribed through the allergy clinic.    Follow-up  - already scheduled 10/20/23 with Dr Ad Harris, CNP  Pulmonary Medicine  [facility]  [phone]     CC:     Increase cough, wheezing     HPI:     53 year old woman with history of asthma, organic fuel exposure in the home, DM, HTN - presents today for acute exacerbation.    Since last visit (7/6/23, Dr Duong), she reports one week of increased symptoms:  increased cough and wheezing.  No mucous production.  Denies fevers.  Does have chest tightness.  She continues on Breo + Spiriva daily.  Albuterol nebulized three times daily.  Dupixent every 2 weeks.  Daughter in law is present with patient today, she is requesting a chest x-ray.           4/4/2023     1:00 PM 7/6/2023     8:00 AM 7/24/2023      9:00 AM   ACT Total Scores   ACT TOTAL SCORE (Goal Greater than or Equal to 20) 8 9 7   In the past 12 months, how many times did you visit the emergency room for your asthma without being admitted to the hospital? 1 0 0   In the past 12 months, how many times were you hospitalized overnight because of your asthma? 0 0 0      ROS:     10-point ROS performed and is negative aside from those listed in HPI.     Medical history:     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    Microcytic anemia 11/17/2019    S/P coronary artery stent placement 02/02/2018    TB lung, latent     9 mos INH     Past Surgical History:   Procedure Laterality Date    CORONARY STENT PLACEMENT  2018    CV CORONARY ANGIOGRAM N/A 1/25/2018    Procedure: Coronary Angiogram;  Surgeon: Angelo Serrano MD;  Location: [facility];  Service:     CV CORONARY ANGIOGRAM N/A 5/5/2022    Procedure: Coronary Angiogram;  Surgeon: Irwin Cano MD;  Location: [facility] CATH LAB CV    CV CORONARY ANGIOGRAM  5/5/2022    Procedure: ;  Surgeon: Irwin Cano MD;  Location: [facility] CATH LAB CV    ME ESOPHAGOGASTRODUODENOSCOPY TRANSORAL DIAGNOSTIC N/A 12/10/2018    Procedure: ESOPHAGOGASTRODUODENOSCOPY (EGD);  Surgeon: Eddie Renteria MD;  Location: [facility] GI;  Service: Gastroenterology    ME ESOPHAGOGASTRODUODENOSCOPY TRANSORAL DIAGNOSTIC N/A 12/3/2020    Procedure: ESOPHAGOGASTRODUODENOSCOPY (EGD) with biospies ;  Surgeon:  Avi Crow MD;  Location: [facility];  Service: Gastroenterology    [facility] COLONOSCOPY W/WO BRUSH/WASH N/A 12/10/2018    Procedure: COLONOSCOPY with polypectomy using biopsy forceps;  Surgeon: Eddie Renteria MD;  Location: [facility];  Service: Gastroenterology         Current Outpatient Medications   Medication Sig Dispense Refill    acetaminophen (TYLENOL) 500 MG tablet TAKE 1 PILL BY MOUTH EVERY 6 HOURS AS NEEDED FOR PAIN 100 tablet 10    albuterol (PROAIR HFA/PROVENTIL HFA/VENTOLIN HFA) 108 (90 Base) MCG/ACT inhaler Inhale 2 puffs into the lungs every 4 hours as needed for shortness of breath / dyspnea 4 g 11    albuterol (PROVENTIL) (2.5 MG/3ML) 0.083% neb solution Take 1 vial (2.5 mg) by nebulization every 6 hours as needed 90 mL 11    ALLERGY RELIEF 10 MG tablet TAKE 1 TABLET (10 MG TOTAL) BY MOUTH DAILY FOR ALLERGIES 30 tablet 1    amitriptyline (ELAVIL) 50 MG tablet Take 1 tablet (50 mg) by mouth At Bedtime 90 tablet 1    ASPIRIN LOW DOSE 81 MG EC tablet TAKE 1 TABLET (81 MG TOTAL) BY MOUTH DAILY. 90 tablet 3    atorvastatin (LIPITOR) 80 MG tablet TAKE 1 TABLET (80 MG TOTAL) BY MOUTH AT BEDTIME FOR CHOLESTEROL 30 tablet 3    B-D U/F 31G X 8 MM insulin pen needle USE ONE PEN NEEDLE DAILY AS NEEDED FOR  each 1    calcium carbonate (TUMS) 500 MG chewable tablet Take 1 tablet (500 mg) by mouth 2 times daily as needed for heartburn 30 tablet 0    Continuous Blood Gluc Sensor (FREESTYLE MONICA 2 SENSOR) [facility] 1 each every 14 days Use 1 sensor every 14 days. Use to read blood sugars per 's instructions. 2 each 11    CONTOUR NEXT TEST test strip USE 1 EACH AS DIRECTED 3 (THREE) TIMES A DAY. 50 strip 11    dextromethorphan (DELSYM) 30 MG/5ML liquid Take 10 mLs (60 mg) by mouth 2 times daily as needed for cough 178 mL 0    diclofenac (VOLTAREN) 1 % topical gel Apply 4 g topically 4 times daily 350 g 3    docusate sodium (COLACE) 100 MG capsule Take 1 capsule (100 mg) by mouth 2 times daily  as needed for constipation 90 capsule 3    dupilumab (DUPIXENT) 300 MG/2ML prefilled pen Inject 2 mLs (300 mg) Subcutaneous every 14 days 4 mL 5    FLUoxetine (PROZAC) 10 MG capsule TAKE 1 CAPSULE (10 MG) BY MOUTH DAILY 90 capsule 2    fluticasone-vilanterol (BREO ELLIPTA) 200-25 MCG/ACT inhaler Inhale 1 puff into the lungs daily 60 each 11    gabapentin (NEURONTIN) 300 MG capsule Take 2 capsules (600 mg) by mouth 3 times daily 540 capsule 3    Global Inject Ease Lancets 30G MISC USE 1 EACH AS DIRECTED 3 (THREE) TIMES A DAY. DISPENSE BRAND PER PATIENT'S INSURANCE AT PHARMACY DISCRETION.(E11.9) 100 each 3    hydrochlorothiazide (MICROZIDE) 12.5 MG capsule TAKE 1 CAPSULE (12.5 MG TOTAL) BY MOUTH DAILY FOR BLOOD PRESSURE 90 capsule 2    hydrocortisone (CORTAID) 1 % external cream Apply topically 2 times daily 60 g 0    insulin aspart (NOVOLOG PEN) 100 UNIT/ML pen Inject 10 Units Subcutaneous 2 times daily (with meals) 15 mL 3    insulin glargine (LANTUS PEN) 100 UNIT/ML pen Inject 40 Units Subcutaneous every morning 45 mL 3    insulin pen needle (32G X 4 MM) 32G X 4 MM miscellaneous Use one pen needles daily or as directed. 200 each 11    ipratropium - albuterol 0.5 mg/2.5 mg/3 mL (DUONEB) 0.5-2.5 (3) MG/3ML neb solution Take 1 vial (3 mLs) by nebulization every 6 hours as needed for shortness of breath / dyspnea or wheezing 360 mL 11    lidocaine (XYLOCAINE) 5 % external ointment Apply topically 3 times daily as needed Small amount (finger tip amount) to chest tid prn pain 35.44 g 0    meclizine (ANTIVERT) 12.5 MG tablet TAKE 2 TABLETS (25 MG) BY MOUTH 3 TIMES DAILY AS NEEDED FOR DIZZINESS 90 tablet 0    metFORMIN (GLUCOPHAGE) 1000 MG tablet Take 1 tablet (1,000 mg) by mouth 2 times daily (with meals) 180 tablet 1    metoprolol tartrate (LOPRESSOR) 25 MG tablet TAKE 1 TABLET (25 MG TOTAL) BY MOUTH 2 (TWO) TIMES A DAY FOR BLOOD PRESSURE 180 tablet 3    montelukast (SINGULAIR) 10 MG tablet Take 1 tablet (10 mg) by mouth  At Bedtime 30 tablet 11    nystatin (MYCOSTATIN) 442196 UNIT/ML suspension Take 5 mLs (500,000 Units) by mouth 4 times daily 473 mL 1    omeprazole (PRILOSEC) 40 MG DR capsule Take 1 capsule (40 mg) by mouth daily 90 capsule 3    Polyethylene Glycol 3350 (PEG 3350) 17 GM/SCOOP POWD MIX 17 GRAMS WITH LIQUID AND DRINK ONCE DAILY FOR CONSTIPATION 1530 g 3    polyvinyl alcohol (ARTIFICIAL TEARS) 1.4 % ophthalmic solution Place 1 drop into both eyes as needed for dry eyes 15 mL 3    predniSONE (DELTASONE) 20 MG tablet Take 2 tablets (40 mg) by mouth daily for 7 days 14 tablet 0    sucralfate (CARAFATE) 1 GM/10ML suspension Take 10 mLs (1 g) by mouth 4 times daily 3600 mL 1    tiotropium (SPIRIVA RESPIMAT) 2.5 MCG/ACT inhaler Inhale 2 puffs into the lungs daily 4 g 11     Social history:  Lives in a house built in 1963; no concern for mold in the house.  7 People live in the house including 2 children.  There are no pets in the house.  She is a never smoker and there is no second hand exposure to smoke.  She does not drink alcoholic beverages and denies indulging in recreational drugs.     Physical Exam:     /64 (BP Location: Left arm, Patient Position: Sitting, Cuff Size: Adult Regular)   Pulse 107   Wt 55.9 kg (123 lb 3.2 oz)   SpO2 99%   BMI 19.89 kg/m    Gen: adult female , appears in NAD.  Daughter in law present, interpreting.  HEENT: clear conjunctivae, moist mucous membranes  CV: RRR, no M/G/R  Resp: CTAB, no focal crackles or wheezes.  Respirations even and unlabored.  On RA.  Cough throughout exam.  Skin: no apparent rashes on visible skin  Ext: no cyanosis, clubbing or edema  Neuro: alert and answering questions appropriately       Data:     Pulmonary function testing 2017:  FEV1/FVC is 80% and is normal.  FEV1 is 1.05L (39%) predicted and is reduced.  FVC is 1.32L (40%) predicted and reduced.  There was no improvement in spirometry after a single inhaled dose of bronchodilator.  Flow volume loops  indicate inconsistent loops.  Impression:  Full Pulmonary Function Test is abnormal.  PFTs are consistent with severe obstructive disease.  Spirometry is not consistent with reversibility.  The results of this test are questionable due to patient's inability to perform the maneuvers according to the ATS standards.            Latest Reference Range & Units 02/07/22 15:47   Neutrophil Cytoplasmic Antibody <1:10  <1:10   Neutrophil Cytoplasmic Antibody Pattern  The ANCA IFA is <1:10.  No further testing will be performed.      Latest Reference Range & Units 02/07/22 15:47   Schistosoma Ab IgG Negative  Negative [1]   Strongyloides Bere IgG <=0.9 IV 0.4 [2]      Latest Reference Range & Units 02/07/22 15:47   Immunoglobulin E <=214 kU/L 74 [1]   Allergen Fungimold A Fumigatus IgE <=0.34 kU/L <0.10 [2]   Aspergillus Fumagatis 1 Antibody None Detected  None Detected [3]   Aspergillus Fumagatis 6 Antibody None Detected  None Detected [4]   Allergen, Interp, Immunocap Score IgE  See Note [5]     XR CHEST 2 VIEWS, DATE/TIME: 3/27/2023 8:32 AM  INDICATION: chest pain  COMPARISON: 02/05/2023                                                              IMPRESSION: No pneumothorax or pleural effusion. No focal consolidation. Cardiac silhouette within normal limits. Mildly atherosclerotic aorta.    CT CHEST W/O CONTRAST, DATE/TIME: 7/11/2022 11:28 PM  INDICATION: Chest pain.  COMPARISON: 5/4/2022.  TECHNIQUE: CT chest without IV contrast. Multiplanar reformats were obtained. Dose reduction techniques were used.  CONTRAST: None.  FINDINGS:   LUNGS AND PLEURA: There is atelectasis and scarring at the lung bases. Scarring at the lung apices. Mild dependent atelectasis bilaterally. Subpleural calcified granuloma or calcified plaque in the right lower lobe laterally. Stable 2 mm nodule in the   right upper lobe posteriorly. Stable 3 mm nodule in the lingula laterally. Tiny nodule along the left major fissure laterally is stable. No  pneumothorax or pleural effusion.  MEDIASTINUM/AXILLAE: No lymph node enlargement. Central airways are unremarkable. The heart size is normal.  CORONARY ARTERY CALCIFICATION: Previous intervention (stents or CABG).                                                             IMPRESSION:   1.  No acute abnormality. No cause of chest pain is evident.  2.  A few small lung nodules without significant change.  Recommendations for one or multiple incidental lung nodules < 6mm :    Low risk patients: No routine follow-up.    High risk patients: Optional follow-up CT at 12 months; if unchanged, no further follow-up.    ECHO 5/4/2022  Interpretation Summary  Left ventricular size, wall motion and function are normal. The ejection  fraction is 55-60%.  There is borderline anterior, septal, and apical wall hypokinesis.  Normal right ventricle size and systolic function.  No hemodynamically significant valvular abnormalities on 2D or color flow  imaging.

## 2023-07-24 NOTE — TELEPHONE ENCOUNTER
Called and spoke with Billy. She wants her to come in for appt today, scheduled with NP at 10:15am.

## 2023-07-24 NOTE — PATIENT INSTRUCTIONS
It was a pleasure to see you in clinic today.   Here is what we discussed:      Start prednisone 40mg x7 days.  I will message you with chest x-ray results.  Continue Breo Ellipta one inhalation daily, rinse/gargle after use.  Continue Spiriva one inhalation daily.  Continue Singulair 10mg daily.  Continue Albuterol every 4-6 hours for shortness of breath or wheezing.  Call us with any change or worsening of your breathing.  Follow-up in 3 months with Dr Duong, already scheduled for 10/20/23.    Ellen Harris, CNP  Pulmonary Medicine  [facility]  [phone]

## 2023-07-25 ENCOUNTER — APPOINTMENT (OUTPATIENT)
Dept: CARDIOLOGY | Facility: HOSPITAL | Age: 53
End: 2023-07-25
Attending: FAMILY MEDICINE
Payer: COMMERCIAL

## 2023-07-25 LAB
ANION GAP SERPL CALCULATED.3IONS-SCNC: 14 MMOL/L (ref 7–15)
BASOPHILS # BLD AUTO: 0 10E3/UL (ref 0–0.2)
BASOPHILS NFR BLD AUTO: 0 %
BUN SERPL-MCNC: 12.4 MG/DL (ref 6–20)
CALCIUM SERPL-MCNC: 8.9 MG/DL (ref 8.6–10)
CHLORIDE SERPL-SCNC: 99 MMOL/L (ref 98–107)
CREAT SERPL-MCNC: 0.5 MG/DL (ref 0.51–0.95)
CRP SERPL-MCNC: 11 MG/L
DEPRECATED HCO3 PLAS-SCNC: 25 MMOL/L (ref 22–29)
EOSINOPHIL # BLD AUTO: 0 10E3/UL (ref 0–0.7)
EOSINOPHIL NFR BLD AUTO: 0 %
ERYTHROCYTE [DISTWIDTH] IN BLOOD BY AUTOMATED COUNT: 15.7 % (ref 10–15)
ERYTHROCYTE [SEDIMENTATION RATE] IN BLOOD BY WESTERGREN METHOD: 25 MM/HR (ref 0–30)
GFR SERPL CREATININE-BSD FRML MDRD: >90 ML/MIN/1.73M2
GLUCOSE BLDC GLUCOMTR-MCNC: 215 MG/DL (ref 70–99)
GLUCOSE BLDC GLUCOMTR-MCNC: 291 MG/DL (ref 70–99)
GLUCOSE BLDC GLUCOMTR-MCNC: 306 MG/DL (ref 70–99)
GLUCOSE BLDC GLUCOMTR-MCNC: 334 MG/DL (ref 70–99)
GLUCOSE BLDC GLUCOMTR-MCNC: 384 MG/DL (ref 70–99)
GLUCOSE BLDC GLUCOMTR-MCNC: 394 MG/DL (ref 70–99)
GLUCOSE BLDC GLUCOMTR-MCNC: 420 MG/DL (ref 70–99)
GLUCOSE SERPL-MCNC: 308 MG/DL (ref 70–99)
HCT VFR BLD AUTO: 36.9 % (ref 35–47)
HGB BLD-MCNC: 11.5 G/DL (ref 11.7–15.7)
IMM GRANULOCYTES # BLD: 0.1 10E3/UL
IMM GRANULOCYTES NFR BLD: 1 %
LVEF ECHO: NORMAL
LYMPHOCYTES # BLD AUTO: 1.1 10E3/UL (ref 0.8–5.3)
LYMPHOCYTES NFR BLD AUTO: 9 %
MCH RBC QN AUTO: 25.4 PG (ref 26.5–33)
MCHC RBC AUTO-ENTMCNC: 31.2 G/DL (ref 31.5–36.5)
MCV RBC AUTO: 82 FL (ref 78–100)
MONOCYTES # BLD AUTO: 0.2 10E3/UL (ref 0–1.3)
MONOCYTES NFR BLD AUTO: 1 %
NEUTROPHILS # BLD AUTO: 11.5 10E3/UL (ref 1.6–8.3)
NEUTROPHILS NFR BLD AUTO: 89 %
NRBC # BLD AUTO: 0 10E3/UL
NRBC BLD AUTO-RTO: 0 /100
PLATELET # BLD AUTO: 201 10E3/UL (ref 150–450)
POTASSIUM SERPL-SCNC: 4.1 MMOL/L (ref 3.4–5.3)
RBC # BLD AUTO: 4.53 10E6/UL (ref 3.8–5.2)
SODIUM SERPL-SCNC: 138 MMOL/L (ref 136–145)
WBC # BLD AUTO: 12.9 10E3/UL (ref 4–11)

## 2023-07-25 PROCEDURE — 250N000013 HC RX MED GY IP 250 OP 250 PS 637: Performed by: INTERNAL MEDICINE

## 2023-07-25 PROCEDURE — 250N000012 HC RX MED GY IP 250 OP 636 PS 637: Performed by: FAMILY MEDICINE

## 2023-07-25 PROCEDURE — 99232 SBSQ HOSP IP/OBS MODERATE 35: CPT | Performed by: INTERNAL MEDICINE

## 2023-07-25 PROCEDURE — 86140 C-REACTIVE PROTEIN: CPT | Performed by: INTERNAL MEDICINE

## 2023-07-25 PROCEDURE — 36415 COLL VENOUS BLD VENIPUNCTURE: CPT | Performed by: FAMILY MEDICINE

## 2023-07-25 PROCEDURE — G0378 HOSPITAL OBSERVATION PER HR: HCPCS

## 2023-07-25 PROCEDURE — 999N000157 HC STATISTIC RCP TIME EA 10 MIN

## 2023-07-25 PROCEDURE — 85652 RBC SED RATE AUTOMATED: CPT | Performed by: INTERNAL MEDICINE

## 2023-07-25 PROCEDURE — 250N000011 HC RX IP 250 OP 636: Performed by: FAMILY MEDICINE

## 2023-07-25 PROCEDURE — 82962 GLUCOSE BLOOD TEST: CPT

## 2023-07-25 PROCEDURE — 85025 COMPLETE CBC W/AUTO DIFF WBC: CPT | Performed by: FAMILY MEDICINE

## 2023-07-25 PROCEDURE — 93306 TTE W/DOPPLER COMPLETE: CPT | Mod: 26 | Performed by: INTERNAL MEDICINE

## 2023-07-25 PROCEDURE — 80048 BASIC METABOLIC PNL TOTAL CA: CPT | Performed by: FAMILY MEDICINE

## 2023-07-25 PROCEDURE — 93306 TTE W/DOPPLER COMPLETE: CPT

## 2023-07-25 PROCEDURE — 94640 AIRWAY INHALATION TREATMENT: CPT | Mod: 76

## 2023-07-25 PROCEDURE — 250N000013 HC RX MED GY IP 250 OP 250 PS 637: Performed by: FAMILY MEDICINE

## 2023-07-25 PROCEDURE — 96375 TX/PRO/DX INJ NEW DRUG ADDON: CPT

## 2023-07-25 PROCEDURE — 250N000009 HC RX 250

## 2023-07-25 PROCEDURE — 99222 1ST HOSP IP/OBS MODERATE 55: CPT | Performed by: INTERNAL MEDICINE

## 2023-07-25 RX ORDER — GABAPENTIN 300 MG/1
600 CAPSULE ORAL 3 TIMES DAILY
Status: DISCONTINUED | OUTPATIENT
Start: 2023-07-25 | End: 2023-07-26 | Stop reason: HOSPADM

## 2023-07-25 RX ORDER — ACETAMINOPHEN 325 MG/1
650 TABLET ORAL EVERY 4 HOURS PRN
Status: DISCONTINUED | OUTPATIENT
Start: 2023-07-25 | End: 2023-07-26 | Stop reason: HOSPADM

## 2023-07-25 RX ORDER — PANTOPRAZOLE SODIUM 40 MG/1
40 TABLET, DELAYED RELEASE ORAL
Status: DISCONTINUED | OUTPATIENT
Start: 2023-07-26 | End: 2023-07-26 | Stop reason: HOSPADM

## 2023-07-25 RX ORDER — NALOXONE HYDROCHLORIDE 0.4 MG/ML
0.2 INJECTION, SOLUTION INTRAMUSCULAR; INTRAVENOUS; SUBCUTANEOUS
Status: DISCONTINUED | OUTPATIENT
Start: 2023-07-25 | End: 2023-07-26 | Stop reason: HOSPADM

## 2023-07-25 RX ORDER — NALOXONE HYDROCHLORIDE 0.4 MG/ML
0.4 INJECTION, SOLUTION INTRAMUSCULAR; INTRAVENOUS; SUBCUTANEOUS
Status: DISCONTINUED | OUTPATIENT
Start: 2023-07-25 | End: 2023-07-26 | Stop reason: HOSPADM

## 2023-07-25 RX ORDER — GUAIFENESIN/DEXTROMETHORPHAN 100-10MG/5
10 SYRUP ORAL EVERY 4 HOURS PRN
Status: DISCONTINUED | OUTPATIENT
Start: 2023-07-25 | End: 2023-07-26 | Stop reason: HOSPADM

## 2023-07-25 RX ORDER — FAMOTIDINE 20 MG/1
20 TABLET, FILM COATED ORAL 2 TIMES DAILY
Status: DISCONTINUED | OUTPATIENT
Start: 2023-07-25 | End: 2023-07-26 | Stop reason: HOSPADM

## 2023-07-25 RX ORDER — COLCHICINE 0.6 MG/1
0.6 TABLET ORAL 2 TIMES DAILY
Status: DISCONTINUED | OUTPATIENT
Start: 2023-07-25 | End: 2023-07-26 | Stop reason: HOSPADM

## 2023-07-25 RX ADMIN — IPRATROPIUM BROMIDE AND ALBUTEROL SULFATE 3 ML: 2.5; .5 SOLUTION RESPIRATORY (INHALATION) at 07:26

## 2023-07-25 RX ADMIN — INSULIN ASPART 4 UNITS: 100 INJECTION, SOLUTION INTRAVENOUS; SUBCUTANEOUS at 19:11

## 2023-07-25 RX ADMIN — GUAIFENESIN AND DEXTROMETHORPHAN 10 ML: 100; 10 SYRUP ORAL at 23:50

## 2023-07-25 RX ADMIN — INSULIN ASPART 2 UNITS: 100 INJECTION, SOLUTION INTRAVENOUS; SUBCUTANEOUS at 11:47

## 2023-07-25 RX ADMIN — IPRATROPIUM BROMIDE AND ALBUTEROL SULFATE 3 ML: 2.5; .5 SOLUTION RESPIRATORY (INHALATION) at 16:14

## 2023-07-25 RX ADMIN — GUAIFENESIN AND DEXTROMETHORPHAN 10 ML: 100; 10 SYRUP ORAL at 08:40

## 2023-07-25 RX ADMIN — GABAPENTIN 600 MG: 300 CAPSULE ORAL at 21:58

## 2023-07-25 RX ADMIN — IPRATROPIUM BROMIDE AND ALBUTEROL SULFATE 3 ML: 2.5; .5 SOLUTION RESPIRATORY (INHALATION) at 11:33

## 2023-07-25 RX ADMIN — MORPHINE SULFATE 2 MG: 2 INJECTION, SOLUTION INTRAMUSCULAR; INTRAVENOUS at 22:08

## 2023-07-25 RX ADMIN — INSULIN ASPART 4 UNITS: 100 INJECTION, SOLUTION INTRAVENOUS; SUBCUTANEOUS at 08:38

## 2023-07-25 RX ADMIN — COLCHICINE 0.6 MG: 0.6 TABLET ORAL at 11:45

## 2023-07-25 RX ADMIN — IPRATROPIUM BROMIDE AND ALBUTEROL SULFATE 3 ML: 2.5; .5 SOLUTION RESPIRATORY (INHALATION) at 19:14

## 2023-07-25 RX ADMIN — ACETAMINOPHEN 650 MG: 325 TABLET ORAL at 12:25

## 2023-07-25 RX ADMIN — ACETAMINOPHEN 650 MG: 325 TABLET ORAL at 08:29

## 2023-07-25 RX ADMIN — GUAIFENESIN AND DEXTROMETHORPHAN 10 ML: 100; 10 SYRUP ORAL at 16:57

## 2023-07-25 RX ADMIN — COLCHICINE 0.6 MG: 0.6 TABLET ORAL at 20:09

## 2023-07-25 RX ADMIN — GUAIFENESIN AND DEXTROMETHORPHAN 10 ML: 100; 10 SYRUP ORAL at 12:25

## 2023-07-25 RX ADMIN — ACETAMINOPHEN 650 MG: 325 TABLET ORAL at 16:57

## 2023-07-25 RX ADMIN — NITROGLYCERIN 0.4 MG: 0.4 TABLET, ORALLY DISINTEGRATING SUBLINGUAL at 17:25

## 2023-07-25 RX ADMIN — INSULIN ASPART 4 UNITS: 100 INJECTION, SOLUTION INTRAVENOUS; SUBCUTANEOUS at 23:45

## 2023-07-25 RX ADMIN — INSULIN ASPART 5 UNITS: 100 INJECTION, SOLUTION INTRAVENOUS; SUBCUTANEOUS at 04:50

## 2023-07-25 RX ADMIN — FAMOTIDINE 20 MG: 20 TABLET, FILM COATED ORAL at 21:58

## 2023-07-25 RX ADMIN — NITROGLYCERIN 0.4 MG: 0.4 TABLET, ORALLY DISINTEGRATING SUBLINGUAL at 17:30

## 2023-07-25 RX ADMIN — AMITRIPTYLINE HYDROCHLORIDE 50 MG: 25 TABLET, FILM COATED ORAL at 21:58

## 2023-07-25 ASSESSMENT — ACTIVITIES OF DAILY LIVING (ADL)
ADLS_ACUITY_SCORE: 37
ADLS_ACUITY_SCORE: 35
ADLS_ACUITY_SCORE: 33
ADLS_ACUITY_SCORE: 37
ADLS_ACUITY_SCORE: 33
ADLS_ACUITY_SCORE: 37
ADLS_ACUITY_SCORE: 33
ADLS_ACUITY_SCORE: 37
ADLS_ACUITY_SCORE: 35
ADLS_ACUITY_SCORE: 37
DEPENDENT_IADLS:: CLEANING;COOKING;LAUNDRY;SHOPPING;MEAL PREPARATION;MEDICATION MANAGEMENT;TRANSPORTATION
ADLS_ACUITY_SCORE: 37
ADLS_ACUITY_SCORE: 37

## 2023-07-25 NOTE — PROGRESS NOTES
[facility]    Medicine Progress Note - Hospitalist Service    Date of Admission:  7/24/2023    Assessment & Plan   Kulwant Amin is a 53 year old female with PMH significant for HTN, HLP, COPD, CVA and DM2 who is admitted on 7/24/2023 with chest pain and COPD exacerbation.     Chest Pain, suspected pericarditis- Admit patient for observation. Initial troponin <6, will repeat in 6 hours. Abnormal EKG. ED physician contacted Cardiologist on call and patient is NOT STEMI at this time. Give ASA 324mg PO x 1 dose. PRN NTG and morphine. Supplemental O2. Hold off on BB due to borderline hypotension. Continue telemetry monitoring. Make NPO after MN. Check Echocardiogram.  Patient cardiology recommendations, continue colchicine as ordered     2. COPD exacerbation- Duonebs and Supplemental O2. S/p Solumedrol x 1 dose in ED.     3. Leukocytosis- No acute pulmonary disease seen on CTA chest. Afebrile. Possible stress induced. Steroids received after blood drawn. Repeat CBC in a.m.     4. DM2- Place on ISS and monitor accuchecks. HBA1C= 7.9 on 7/10/2023.       Diet: Moderate Consistent Carb (60 g CHO per Meal) Diet    DVT Prophylaxis: Pneumatic Compression Devices  Collins Catheter: Not present  Lines: None     Cardiac Monitoring: ACTIVE order. Indication: Chest pain/ ACS rule out (24 hours)  Code Status: Full Code      Clinically Significant Risk Factors Present on Admission                # Drug Induced Platelet Defect: home medication list includes an antiplatelet medication   # Hypertension: Noted on problem list     # DMII: A1C = 7.9 % (Ref range: 0.0 - 5.6 %) within past 6 months             Disposition Plan      Expected Discharge Date: 07/25/2023                  Kathe Del Valle MD  Hospitalist Service  [facility]  Securely message with [employer] (more info)  Text page via [employer]   ______________________________________________________________________    Interval  History   Patient was seen and examined, overnight events were reviewed  Patient has no specific complaints, she would like comfortably in stretcher.  She has no complaints, denies any current chest pain    Physical Exam   Vital Signs: Temp: 97.9  F (36.6  C) Temp src: Oral BP: 115/73 Pulse: 111   Resp: 27 SpO2: 98 % O2 Device: None (Room air)    Weight: 134 lbs 0 oz    Constitutional: awake, alert, cooperative, no apparent distress, and appears stated age  Respiratory: No increased work of breathing, good air exchange, clear to auscultation bilaterally, no crackles or wheezing  Cardiovascular: Normal apical impulse, regular rate and rhythm, normal S1 and S2, no S3 or S4, and no murmur noted  GI: No scars, normal bowel sounds, soft, non-distended, non-tender, no masses palpated, no hepatosplenomegally  Neuropsychiatric: General: normal, calm, and normal eye contact  Level of consciousness: alert / normal  Affect: normal    Medical Decision Making       35 MINUTES SPENT BY ME on the date of service doing chart review, history, exam, documentation & further activities per the note.      Data     I have personally reviewed the following data over the past 24 hrs:    12.9 (H)  \   11.5 (L)   / 201     138 99 12.4 /  215 (H)   4.1 25 0.50 (L) \     ALT: 40 AST: 46 (H) AP: 118 (H) TBILI: 0.3   ALB: 4.2 TOT PROTEIN: 7.1 LIPASE: 221 (H)     Trop: <6 BNP: <36     Procal: N/A CRP: 11.00 (H) Lactic Acid: N/A         Imaging results reviewed over the past 24 hrs:   Recent Results (from the past 24 hour(s))   CTA Chest Abdomen Pelvis w Contrast    Narrative    EXAM: CTA CHEST ABDOMEN PELVIS W CONTRAST  LOCATION: [facility]  DATE: 7/24/2023    INDICATION: chest pain with radiation into back  COMPARISON: CT abdomen and pelvis 11/24/2022  TECHNIQUE: CT angiogram chest abdomen pelvis during arterial phase of injection of IV contrast. 2D and 3D MIP reconstructions were performed by the CT technologist. Dose  reduction techniques were used.   CONTRAST: isovue 370 90ml    FINDINGS:   CT ANGIOGRAM CHEST, ABDOMEN, AND PELVIS: The thoracic and abdominal aorta are negative for dissection or aneurysm. Moderate atherosclerotic disease thoracic aorta with plaque at the origins of the left clavian and innominate arteries without significant   narrowing. Moderate plaque lower abdominal aorta. Celiac trunk superior and inferior mesenteric arteries, and left renal artery are patent without significant narrowing. Plaque and slight narrowing proximal right common iliac artery the right and left   common and external iliac arteries are widely patent.    LUNGS AND PLEURA: Scarring both lung apices. Mosaic attenuation pattern both lower lobes. No acute infiltrates or pleural effusions. Minimal scarring or atelectasis in the bases.    MEDIASTINUM/AXILLAE: No enlarged mediastinal or hilar nodes.    CORONARY ARTERY CALCIFICATION: Previous intervention (stents or CABG).    HEPATOBILIARY: Normal.    PANCREAS: Normal.    SPLEEN: Normal.    ADRENAL GLANDS: Normal.    KIDNEYS/BLADDER: No renal calculi or hydronephrosis. Distended bladder.    BOWEL: Incidental small air-filled duodenal diverticulum.    LYMPH NODES: Normal.    PELVIC ORGANS: Normal.    MUSCULOSKELETAL: Unremarkable.      Impression    IMPRESSION:  1.  No evidence for aortic dissection or aneurysm.  2.  Mosaic attenuation pattern both lower lung fields suggesting air trapping or small airways/vessel disease. Otherwise no acute pulmonary disease.  3.  No acute disease within the abdomen or pelvis.

## 2023-07-25 NOTE — CONSULTS
[facility] team is asked to see Kulwant Amin in consultation to evaluate chest pain, abnormal EKG.      Assessment:     1. Acute on chronic chest pain and shortness of breath.  Etiology uncertain, with little benefit from previous GI evaluation, percutaneous coronary revascularization with LAD stent 1 year ago.  Acute worsening of uncertain etiology yesterday, somewhat improved today..  Intermittently associated with chills and sweatiness.  ECG suggestive of acute pericarditis.  Discussed potential benefits of colchicine for inflammation reduction.  2. History of coronary artery disease status post LAD stent placement.  Without significant improvement in symptoms according to patient and daughters today.  Normal troponins, recent normal stress test, would not evaluate further for coronary disease at this point.     Plan:     1. Colchicine 0.6 mg p.o. twice daily.  Decrease to once daily if diarrhea develops.  2. Follow-up in heart care clinic 1 month     Current History:     Kulwant Amin is a 53-year-old woman who speaks Persian, who is accompanied by her 2 daughters.  For couple of years she has had chronic shortness of breath with activity and chest pains.  This chest pain is well localized to the left anterior chest, worsened with deep breaths, coughing.  It is provoked by activities.  She has undergone extensive testing including coronary angiography with LAD stent placement last year.  Patient reports this a little little to improve the severity of her discomfort, yet her symptoms have persisted.  She noted no difference coming off of dual antiplatelet therapy earlier this year.  She has had GI evaluation, patient and daughters report this has not been particularly helpful.    Interview is conducted today with some difficulty via telephone Persian .    She has had some sweats and chills, but yesterday the shortness of breath was worse above her baseline.  The  intensity of the pain was also more severe, though it is apparently chronic and daily over the last couple of years.    Past Medical History:     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     Microcytic anemia 11/17/2019     S/P coronary artery stent placement 02/02/2018     TB lung, latent     9 mos INH     Sleep history:   Past Surgical History:     Past Surgical History:   Procedure Laterality Date     CORONARY STENT PLACEMENT  2018     CV CORONARY ANGIOGRAM N/A 1/25/2018    Procedure: Coronary Angiogram;  Surgeon: Angelo Serrano MD;  Location: [facility] Cath Lab;  Service:      CV CORONARY ANGIOGRAM N/A 5/5/2022    Procedure: Coronary Angiogram;  Surgeon: Irwin Cano MD;  Location: [facility] CATH LAB CV     CV CORONARY ANGIOGRAM  5/5/2022    Procedure: ;  Surgeon: Irwin Cano MD;  Location: [facility] CV     NV ESOPHAGOGASTRODUODENOSCOPY TRANSORAL DIAGNOSTIC N/A 12/10/2018    Procedure: ESOPHAGOGASTRODUODENOSCOPY (EGD);  Surgeon: Eddie Renteria MD;  Location: [facility];  Service: Gastroenterology     NV ESOPHAGOGASTRODUODENOSCOPY TRANSORAL DIAGNOSTIC N/A 12/3/2020    Procedure: ESOPHAGOGASTRODUODENOSCOPY (EGD) with biospies ;  Surgeon: Avi Crow MD;  Location: [facility];  Service: Gastroenterology     [facility] COLONOSCOPY W/WO BRUSH/WASH N/A 12/10/2018    Procedure: COLONOSCOPY with polypectomy using biopsy forceps;  Surgeon: Eddie Renteria MD;   Location: [facility];  Service: Gastroenterology       Family History:     Family History   Problem Relation Age of Onset     Other - See Comments Mother          of an intestinal problem     Ulcers Father          of gastritis     Breast Cancer No family hx of      Family history reviewed and is not pertinent to the chief complaint or presenting problem    Social History:    reports that she has never smoked. She has never been exposed to tobacco smoke. She has never used smokeless tobacco. She reports that she does not drink alcohol and does not use drugs.  Exercise history: Limited because of shortness of breath and chest pain.  Walks with cane    Meds:     Current Outpatient Medications   Medication Sig Dispense Refill     acetaminophen (TYLENOL) 500 MG tablet TAKE 1 PILL BY MOUTH EVERY 6 HOURS AS NEEDED FOR PAIN (Patient taking differently: Take 500 mg by mouth every 6 hours as needed for mild pain) 100 tablet 10     albuterol (PROAIR HFA/PROVENTIL HFA/VENTOLIN HFA) 108 (90 Base) MCG/ACT inhaler Inhale 2 puffs into the lungs every 4 hours as needed for shortness of breath / dyspnea 4 g 11     albuterol (PROVENTIL) (2.5 MG/3ML) 0.083% neb solution Take 1 vial (2.5 mg) by nebulization every 6 hours as needed 90 mL 11     ALLERGY RELIEF 10 MG tablet TAKE 1 TABLET (10 MG TOTAL) BY MOUTH DAILY FOR ALLERGIES (Patient taking differently: Take 10 mg by mouth daily) 30 tablet 1     amitriptyline (ELAVIL) 50 MG tablet Take 1 tablet (50 mg) by mouth At Bedtime 90 tablet 1     ASPIRIN LOW DOSE 81 MG EC tablet TAKE 1 TABLET (81 MG TOTAL) BY MOUTH DAILY. 90 tablet 3     atorvastatin (LIPITOR) 80 MG tablet TAKE 1 TABLET (80 MG TOTAL) BY MOUTH AT BEDTIME FOR CHOLESTEROL (Patient taking differently: Take 80 mg by mouth daily) 30 tablet 3     calcium carbonate (TUMS) 500 MG chewable tablet Take 1 tablet (500 mg) by mouth 2 times daily as needed for heartburn 30 tablet 0     cyclobenzaprine (FLEXERIL) 5 MG tablet Take  5 mg by mouth 3 times daily as needed for muscle spasms       dextromethorphan (DELSYM) 30 MG/5ML liquid Take 10 mLs (60 mg) by mouth 2 times daily as needed for cough 178 mL 0     diclofenac (VOLTAREN) 1 % topical gel Apply 4 g topically 4 times daily 350 g 3     docusate sodium (COLACE) 100 MG capsule Take 1 capsule (100 mg) by mouth 2 times daily as needed for constipation 90 capsule 3     dupilumab (DUPIXENT) 300 MG/2ML prefilled pen Inject 2 mLs (300 mg) Subcutaneous every 14 days 4 mL 5     famotidine (PEPCID) 20 MG tablet Take 20 mg by mouth 2 times daily       FLUoxetine (PROZAC) 10 MG capsule TAKE 1 CAPSULE (10 MG) BY MOUTH DAILY 90 capsule 2     fluticasone-vilanterol (BREO ELLIPTA) 200-25 MCG/ACT inhaler Inhale 1 puff into the lungs daily 60 each 11     gabapentin (NEURONTIN) 300 MG capsule Take 2 capsules (600 mg) by mouth 3 times daily 540 capsule 3     hydrochlorothiazide (MICROZIDE) 12.5 MG capsule TAKE 1 CAPSULE (12.5 MG TOTAL) BY MOUTH DAILY FOR BLOOD PRESSURE 90 capsule 2     ibuprofen (ADVIL/MOTRIN) 600 MG tablet Take 600 mg by mouth every 6 hours as needed for moderate pain       insulin aspart (NOVOLOG PEN) 100 UNIT/ML pen Inject 10 Units Subcutaneous 2 times daily (with meals) 15 mL 3     insulin glargine (LANTUS PEN) 100 UNIT/ML pen Inject 40 Units Subcutaneous every morning 45 mL 3     ipratropium - albuterol 0.5 mg/2.5 mg/3 mL (DUONEB) 0.5-2.5 (3) MG/3ML neb solution Take 1 vial (3 mLs) by nebulization every 6 hours as needed for shortness of breath / dyspnea or wheezing 360 mL 11     meclizine (ANTIVERT) 12.5 MG tablet TAKE 2 TABLETS (25 MG) BY MOUTH 3 TIMES DAILY AS NEEDED FOR DIZZINESS 90 tablet 0     metFORMIN (GLUCOPHAGE) 1000 MG tablet Take 1 tablet (1,000 mg) by mouth 2 times daily (with meals) 180 tablet 1     metoprolol tartrate (LOPRESSOR) 25 MG tablet TAKE 1 TABLET (25 MG TOTAL) BY MOUTH 2 (TWO) TIMES A DAY FOR BLOOD PRESSURE 180 tablet 3     montelukast (SINGULAIR) 10 MG tablet  Take 1 tablet (10 mg) by mouth At Bedtime 30 tablet 11     nystatin (MYCOSTATIN) 975524 UNIT/ML suspension Take 5 mLs (500,000 Units) by mouth 4 times daily 473 mL 1     omeprazole (PRILOSEC) 40 MG DR capsule Take 1 capsule (40 mg) by mouth daily 90 capsule 3     Polyethylene Glycol 3350 (PEG 3350) 17 GM/SCOOP POWD MIX 17 GRAMS WITH LIQUID AND DRINK ONCE DAILY FOR CONSTIPATION 1530 g 3     predniSONE (DELTASONE) 20 MG tablet Take 2 tablets (40 mg) by mouth daily for 7 days 14 tablet 0     sucralfate (CARAFATE) 1 GM/10ML suspension Take 10 mLs (1 g) by mouth 4 times daily 3600 mL 1     tiotropium (SPIRIVA RESPIMAT) 2.5 MCG/ACT inhaler Inhale 2 puffs into the lungs daily 4 g 11     B-D U/F 31G X 8 MM insulin pen needle USE ONE PEN NEEDLE DAILY AS NEEDED FOR  each 1     Continuous Blood Gluc Sensor (FREESTYLE MONICA 2 SENSOR) MISC 1 each every 14 days Use 1 sensor every 14 days. Use to read blood sugars per 's instructions. 2 each 11     CONTOUR NEXT TEST test strip USE 1 EACH AS DIRECTED 3 (THREE) TIMES A DAY. 50 strip 11     Global Inject Ease Lancets 30G MISC USE 1 EACH AS DIRECTED 3 (THREE) TIMES A DAY. DISPENSE BRAND PER PATIENT'S INSURANCE AT PHARMACY DISCRETION.(E11.9) 100 each 3     hydrocortisone (CORTAID) 1 % external cream Apply topically 2 times daily (Patient not taking: Reported on 7/24/2023) 60 g 0     insulin pen needle (32G X 4 MM) 32G X 4 MM miscellaneous Use one pen needles daily or as directed. 200 each 11     lidocaine (XYLOCAINE) 5 % external ointment Apply topically 3 times daily as needed Small amount (finger tip amount) to chest tid prn pain (Patient not taking: Reported on 7/24/2023) 35.44 g 0     polyvinyl alcohol (ARTIFICIAL TEARS) 1.4 % ophthalmic solution Place 1 drop into both eyes as needed for dry eyes (Patient not taking: Reported on 7/24/2023) 15 mL 3       Allergies:   Patient has no known allergies.    Review of Systems:   A 12 point comprehensive review of systems  was negative except as noted in the current history.    Objective:      Physical Exam  Wt Readings from Last 3 Encounters:   07/24/23 60.8 kg (134 lb)   07/24/23 55.9 kg (123 lb 3.2 oz)   07/10/23 56.7 kg (125 lb)     Body mass index is 21.63 kg/m .  /74   Pulse 105   Temp 97.9  F (36.6  C) (Oral)   Resp 23   Wt 60.8 kg (134 lb)   SpO2 98%   BMI 21.63 kg/m      General Appearance:  No apparent distress.  HEENT: No scleral icterus; the mucous membranes were pink and moist.  Conjunctiva not injected  Neck:  No cervical bruits, jugular venous distention, or thyromegaly.  Chest:The spine was straight. The chest was symmetric.  Lungs:  Respirations unlabored; the lungs are clear to auscultation.  Cardiovascular:     Normal point of maximal impulse. Auscultation reveals regular first and second heart sounds with no murmurs, rubs, or gallops. Carotid, radial, and dorsalis pedal pulses are intact and symmetric.  Abdomen:  No organomegaly, masses, bruits, or tenderness. Bowels sounds are present  Extremities: No cyanosis, clubbing, or edema.  Skin:  No xanthelasma.  Neurologic: Mood and affect are appropriate. Speech is fluent with daughters.      EKG (personally reviewed): Sinus rhythm 98 bpm.  Diffuse ST elevation consistent with pericarditis    Imaging   CTA chest abdomen pelvis 7/24/2023:CORONARY ARTERY CALCIFICATION: Previous intervention (stents or CABG).  1.  No evidence for aortic dissection or aneurysm.  2.  Mosaic attenuation pattern both lower lung fields suggesting air trapping or small airways/vessel disease. Otherwise no acute pulmonary disease.  3.  No acute disease within the abdomen or pelvis.      Cardiac diagnostics    Echocardiogram pending.    Pharmacologic nuclear stress test 4/2023:     The nuclear stress test is negative for inducible myocardial ischemia or infarction.     The patient is at a low risk of future cardiac ischemic events.     Left ventricular function is hyperdynamic.     The  left ventricular ejection fraction at stress is greater than 70%.     A prior study was conducted on 1/22/2019.  This study has changes noted when compared with the prior study. There is now evidence of transient ischemic dilation.     Stress to rest cavity ratio is 1.38.  TID is present quantitatively but not visually.  This is a nonspecific finding that can indicate the presence of subendocardial ischemia and clinical correlation recommended.        Lab Review     Lab Results   Component Value Date    HGB 11.5 07/25/2023    HGB 13.7 09/11/2015     Lab Results   Component Value Date     07/25/2023     01/26/2015     Lab Results   Component Value Date    POTASSIUM 4.1 07/25/2023    POTASSIUM 4.1 09/06/2022    POTASSIUM 3.7 06/18/2015     Lab Results   Component Value Date    BUN 12.4 07/25/2023    BUN 12 09/06/2022    BUN 14.1 06/18/2015     Lab Results   Component Value Date    CR 0.50 07/25/2023    CR 0.7 06/18/2015     Lab Results   Component Value Date    CHOL 125 03/14/2023    CHOL 212.0 02/19/2014    HDL 41 03/14/2023    HDL 50.0 02/19/2014    LDL 41 03/14/2023    LDL 57 06/20/2018    LDL 94.0 02/19/2014    TRIG 214 03/14/2023    TRIG 342.0 02/19/2014    CHOLHDLRATIO 4.3 02/19/2014           Ja Kramer MD  [facility]  7/25/2023    Note created using Dragon voice recognition software.  Sound alike errors may have escaped editing.    Clinically Significant Risk Factors Present on Admission                 # Drug Induced Platelet Defect: home medication list includes an antiplatelet medication

## 2023-07-25 NOTE — H&P
[facility]    History and Physical - Hospitalist Service       Date of Admission:  7/24/2023    Assessment & Plan      Kulwant Amin is a 53 year old female with PMH significant for HTN, HLP, COPD, CVA and DM2 who is admitted on 7/24/2023 with chest pain and COPD exacerbation.    Chest Pain- Admit patient for observation. Initial troponin <6, will repeat in 6 hours. Abnormal EKG. ED physician contacted Cardiologist on call and patient is NOT STEMI at this time. Give ASA 324mg PO x 1 dose. PRN NTG and morphine. Supplemental O2. Hold off on BB due to borderline hypotension. Continue telemetry monitoring. Make NPO after MN. Check Echocardiogram. Follow up Cardiology recommendations.    2. COPD exacerbation- Duonebs and Supplemental O2. S/p Solumedrol x 1 dose in ED.    3. Leukocytosis- No acute pulmonary disease seen on CTA chest. Afebrile. Possible stress induced. Steroids received after blood drawn. Repeat CBC in a.m.    4. DM2- Place on ISS and monitor accuchecks. HBA1C= 7.9 on 7/10/2023.     Diet:  Cardiac/ Diabetic, NPO after MN  DVT Prophylaxis: Pneumatic Compression Devices  Collins Catheter: Not present  Lines: None     Cardiac Monitoring: None  Code Status:  Full Code per patient.    Clinically Significant Risk Factors Present on Admission                # Drug Induced Platelet Defect: home medication list includes an antiplatelet medication   # Hypertension: Noted on problem list     # DMII: A1C = 7.9 % (Ref range: 0.0 - 5.6 %) within past 6 months             Disposition Plan Anticipate discharge home in < 2 days if medically cleared.     Expected Discharge Date: 07/25/2023                  Kathryn Sellers MD  Hospitalist Service  [facility]  Securely message with Reactivity (more info)  Text page via siXis Paging/Directory     ______________________________________________________________________    Chief Complaint   Chest pain, SOB    History is obtained from the  patient (via [facility] ), ED physician and chart review.    History of Present Illness   Kulwant Amin is a 53 year old female with PMH significant for HTN, HLP, COPD, CVA and DM2 who presents to ED due to chest pain and shortness of breath x 2 days. Worse with exertion. Associated symptoms include wheezing, coughing and nausea. She denies diaphoresis, vomiting, abdominal pain, palpitations, lightheadedness or dizziness.       Past Medical History    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    Microcytic anemia 11/17/2019    S/P coronary artery stent placement 02/02/2018    TB lung, latent     9 mos INH       Past Surgical History   Past Surgical History:   Procedure Laterality Date    CORONARY STENT PLACEMENT  2018    CV CORONARY ANGIOGRAM N/A 1/25/2018    Procedure: Coronary Angiogram;  Surgeon: Angelo Serrano MD;  Location: [facility] Cath Lab;  Service:     CV CORONARY ANGIOGRAM N/A 5/5/2022    Procedure: Coronary Angiogram;  Surgeon: Irwin Cano MD;  Location: [facility] CATH LAB CV    CV CORONARY ANGIOGRAM  5/5/2022    Procedure: ;  Surgeon: Irwin Cano MD;  Location: [facility] CATH LAB CV    ND ESOPHAGOGASTRODUODENOSCOPY TRANSORAL DIAGNOSTIC N/A 12/10/2018    Procedure: ESOPHAGOGASTRODUODENOSCOPY (EGD);  Surgeon: Eddie Renteria MD;  Location: [facility] GI;  Service: Gastroenterology    ND ESOPHAGOGASTRODUODENOSCOPY TRANSORAL DIAGNOSTIC N/A 12/3/2020    Procedure:  ESOPHAGOGASTRODUODENOSCOPY (EGD) with biospies ;  Surgeon: Avi Crow MD;  Location: [facility];  Service: Gastroenterology    [facility] COLONOSCOPY W/WO BRUSH/WASH N/A 12/10/2018    Procedure: COLONOSCOPY with polypectomy using biopsy forceps;  Surgeon: Eddie Renteria MD;  Location: [facility];  Service: Gastroenterology       Prior to Admission Medications   Prior to Admission Medications   Prescriptions Last Dose Informant Patient Reported? Taking?   ALLERGY RELIEF 10 MG tablet   No No   Sig: TAKE 1 TABLET (10 MG TOTAL) BY MOUTH DAILY FOR ALLERGIES   ASPIRIN LOW DOSE 81 MG EC tablet   No No   Sig: TAKE 1 TABLET (81 MG TOTAL) BY MOUTH DAILY.   B-D U/F 31G X 8 MM insulin pen needle   No No   Sig: USE ONE PEN NEEDLE DAILY AS NEEDED FOR SSI   CONTOUR NEXT TEST test strip   No No   Sig: USE 1 EACH AS DIRECTED 3 (THREE) TIMES A DAY.   Continuous Blood Gluc Sensor (FREESTYLE MONICA 2 SENSOR) MISC   No No   Si each every 14 days Use 1 sensor every 14 days. Use to read blood sugars per 's instructions.   FLUoxetine (PROZAC) 10 MG capsule   No No   Sig: TAKE 1 CAPSULE (10 MG) BY MOUTH DAILY   Global Inject Ease Lancets 30G MISC   No No   Sig: USE 1 EACH AS DIRECTED 3 (THREE) TIMES A DAY. DISPENSE BRAND PER PATIENT'S INSURANCE AT PHARMACY DISCRETION.(E11.9)   Polyethylene Glycol 3350 (PEG 3350) 17 GM/SCOOP POWD   No No   Sig: MIX 17 GRAMS WITH LIQUID AND DRINK ONCE DAILY FOR CONSTIPATION   acetaminophen (TYLENOL) 500 MG tablet   No No   Sig: TAKE 1 PILL BY MOUTH EVERY 6 HOURS AS NEEDED FOR PAIN   albuterol (PROAIR HFA/PROVENTIL HFA/VENTOLIN HFA) 108 (90 Base) MCG/ACT inhaler   No No   Sig: Inhale 2 puffs into the lungs every 4 hours as needed for shortness of breath / dyspnea   albuterol (PROVENTIL) (2.5 MG/3ML) 0.083% neb solution   No No   Sig: Take 1 vial (2.5 mg) by nebulization every 6 hours as needed   amitriptyline (ELAVIL) 50 MG tablet   No No   Sig: Take 1 tablet (50 mg) by mouth At Bedtime    atorvastatin (LIPITOR) 80 MG tablet   No No   Sig: TAKE 1 TABLET (80 MG TOTAL) BY MOUTH AT BEDTIME FOR CHOLESTEROL   calcium carbonate (TUMS) 500 MG chewable tablet   No No   Sig: Take 1 tablet (500 mg) by mouth 2 times daily as needed for heartburn   dextromethorphan (DELSYM) 30 MG/5ML liquid   No No   Sig: Take 10 mLs (60 mg) by mouth 2 times daily as needed for cough   diclofenac (VOLTAREN) 1 % topical gel   No No   Sig: Apply 4 g topically 4 times daily   docusate sodium (COLACE) 100 MG capsule   No No   Sig: Take 1 capsule (100 mg) by mouth 2 times daily as needed for constipation   dupilumab (DUPIXENT) 300 MG/2ML prefilled pen   No No   Sig: Inject 2 mLs (300 mg) Subcutaneous every 14 days   fluticasone-vilanterol (BREO ELLIPTA) 200-25 MCG/ACT inhaler   No No   Sig: Inhale 1 puff into the lungs daily   gabapentin (NEURONTIN) 300 MG capsule   No No   Sig: Take 2 capsules (600 mg) by mouth 3 times daily   hydrochlorothiazide (MICROZIDE) 12.5 MG capsule   No No   Sig: TAKE 1 CAPSULE (12.5 MG TOTAL) BY MOUTH DAILY FOR BLOOD PRESSURE   hydrocortisone (CORTAID) 1 % external cream   No No   Sig: Apply topically 2 times daily   insulin aspart (NOVOLOG PEN) 100 UNIT/ML pen   No No   Sig: Inject 10 Units Subcutaneous 2 times daily (with meals)   insulin glargine (LANTUS PEN) 100 UNIT/ML pen   No No   Sig: Inject 40 Units Subcutaneous every morning   insulin pen needle (32G X 4 MM) 32G X 4 MM miscellaneous   No No   Sig: Use one pen needles daily or as directed.   ipratropium - albuterol 0.5 mg/2.5 mg/3 mL (DUONEB) 0.5-2.5 (3) MG/3ML neb solution   No No   Sig: Take 1 vial (3 mLs) by nebulization every 6 hours as needed for shortness of breath / dyspnea or wheezing   lidocaine (XYLOCAINE) 5 % external ointment   No No   Sig: Apply topically 3 times daily as needed Small amount (finger tip amount) to chest tid prn pain   meclizine (ANTIVERT) 12.5 MG tablet   No No   Sig: TAKE 2 TABLETS (25 MG) BY MOUTH 3 TIMES DAILY AS  NEEDED FOR DIZZINESS   metFORMIN (GLUCOPHAGE) 1000 MG tablet   No No   Sig: Take 1 tablet (1,000 mg) by mouth 2 times daily (with meals)   metoprolol tartrate (LOPRESSOR) 25 MG tablet   No No   Sig: TAKE 1 TABLET (25 MG TOTAL) BY MOUTH 2 (TWO) TIMES A DAY FOR BLOOD PRESSURE   montelukast (SINGULAIR) 10 MG tablet   No No   Sig: Take 1 tablet (10 mg) by mouth At Bedtime   nystatin (MYCOSTATIN) 966911 UNIT/ML suspension   No No   Sig: Take 5 mLs (500,000 Units) by mouth 4 times daily   omeprazole (PRILOSEC) 40 MG DR capsule   No No   Sig: Take 1 capsule (40 mg) by mouth daily   polyvinyl alcohol (ARTIFICIAL TEARS) 1.4 % ophthalmic solution   No No   Sig: Place 1 drop into both eyes as needed for dry eyes   predniSONE (DELTASONE) 20 MG tablet   No No   Sig: Take 2 tablets (40 mg) by mouth daily for 7 days   sucralfate (CARAFATE) 1 GM/10ML suspension   No No   Sig: Take 10 mLs (1 g) by mouth 4 times daily   tiotropium (SPIRIVA RESPIMAT) 2.5 MCG/ACT inhaler   No No   Sig: Inhale 2 puffs into the lungs daily      Facility-Administered Medications: None        Review of Systems    The 10 point Review of Systems is negative other than noted in the HPI.    Social History   I have reviewed this patient's social history and updated it with pertinent information if needed.  Social History     Tobacco Use    Smoking status: Never     Passive exposure: Never    Smokeless tobacco: Never    Tobacco comments:     No passive exposure   Vaping Use    Vaping Use: Never used   Substance Use Topics    Alcohol use: No    Drug use: No         Family History   I have reviewed this patient's family history and updated it with pertinent information if needed.  Family History   Problem Relation Age of Onset    Other - See Comments Mother          of an intestinal problem    Ulcers Father          of gastritis    Breast Cancer No family hx of         Physical Exam   Vital Signs: Temp: 97  F (36.1  C) Temp src: Temporal BP: 105/58 Pulse:  102   Resp: 24 SpO2: 99 % O2 Device: None (Room air)    Weight: 134 lbs 0 oz    General Appearance: Awake, Alert, NAD  Respiratory: Scattered wheezes  Cardiovascular: Borderline Tachycardia, S1S2  GI: +BS, soft, NT, ND  Skin: No rash  Other: No pedal edema     Medical Decision Making       55 MINUTES SPENT BY ME on the date of service doing chart review, history, exam, documentation & further activities per the note.      Data     I have personally reviewed the following data over the past 24 hrs:    12.0 (H)  \   12.5   / 231     137 102 12.8 /  215 (H)   4.5 26 0.60 \     ALT: 40 AST: 46 (H) AP: 118 (H) TBILI: 0.3   ALB: 4.2 TOT PROTEIN: 7.1 LIPASE: 221 (H)     Trop: <6 BNP: <36       Imaging results reviewed over the past 24 hrs:   Recent Results (from the past 24 hour(s))   XR Chest 2 Views    Narrative    EXAM: XR CHEST 2 VIEWS  LOCATION: [facility]  DATE: 7/24/2023    INDICATION:  Severe persistent asthma with exacerbation. Shortness of breath.  COMPARISON: 06/20/2023 and older studies.      Impression    IMPRESSION: Calcified granuloma in the right midlung. No hydropneumothorax. Lungs are clear. Heart and pulmonary vascularity are normal. No signs of acute disease.   CTA Chest Abdomen Pelvis w Contrast    Narrative    EXAM: CTA CHEST ABDOMEN PELVIS W CONTRAST  LOCATION: [facility]  DATE: 7/24/2023    INDICATION: chest pain with radiation into back  COMPARISON: CT abdomen and pelvis 11/24/2022  TECHNIQUE: CT angiogram chest abdomen pelvis during arterial phase of injection of IV contrast. 2D and 3D MIP reconstructions were performed by the CT technologist. Dose reduction techniques were used.   CONTRAST: isovue 370 90ml    FINDINGS:   CT ANGIOGRAM CHEST, ABDOMEN, AND PELVIS: The thoracic and abdominal aorta are negative for dissection or aneurysm. Moderate atherosclerotic disease thoracic aorta with plaque at the origins of the left clavian and innominate  arteries without significant   narrowing. Moderate plaque lower abdominal aorta. Celiac trunk superior and inferior mesenteric arteries, and left renal artery are patent without significant narrowing. Plaque and slight narrowing proximal right common iliac artery the right and left   common and external iliac arteries are widely patent.    LUNGS AND PLEURA: Scarring both lung apices. Mosaic attenuation pattern both lower lobes. No acute infiltrates or pleural effusions. Minimal scarring or atelectasis in the bases.    MEDIASTINUM/AXILLAE: No enlarged mediastinal or hilar nodes.    CORONARY ARTERY CALCIFICATION: Previous intervention (stents or CABG).    HEPATOBILIARY: Normal.    PANCREAS: Normal.    SPLEEN: Normal.    ADRENAL GLANDS: Normal.    KIDNEYS/BLADDER: No renal calculi or hydronephrosis. Distended bladder.    BOWEL: Incidental small air-filled duodenal diverticulum.    LYMPH NODES: Normal.    PELVIC ORGANS: Normal.    MUSCULOSKELETAL: Unremarkable.      Impression    IMPRESSION:  1.  No evidence for aortic dissection or aneurysm.  2.  Mosaic attenuation pattern both lower lung fields suggesting air trapping or small airways/vessel disease. Otherwise no acute pulmonary disease.  3.  No acute disease within the abdomen or pelvis.

## 2023-07-25 NOTE — ED NOTES
RESPIRATORY CARE NOTE     Patient Name: Kulwant Amin  Today's Date: 7/25/2023       Pt remains on RA and denies any shortness of breath at this time. Bs: fainted wheezes auscultated upper airways and decreased @ bases. Pt did receive Duo neb tx as scheduled, no undesired effects noted. Will continue to follow.     Shani Claudio RRT

## 2023-07-25 NOTE — MEDICATION SCRIBE - ADMISSION MEDICATION HISTORY
Medication Scribe Admission Medication History    Admission medication history is complete. The information provided in this note is only as accurate as the sources available at the time of the update.    Medication reconciliation/reorder completed by provider prior to medication history? No    Information Source(s): Family member via in-person    Pertinent Information: Patient's daughter present in room at time of PTA question. Offered Serbian translation and declined by daughter (Billy).  Billy states that her mother took all of her morning medications today and nothing in afternoon.    Gabapentin 600 mg tablet shows as filled on Rx fill history as of 7/1/23 but patients daughter states that her mother still takes two of 300 mg capsules    Changes made to PTA medication list:  Added: Ibuprofen 600 mg tablet, Famotidine 20 mg tablet, Cyclobenzaprine 5 mg tablet  Deleted: None  Changed: None    Medication Affordability:  Not including over the counter (OTC) medications, was there a time in the past 3 months when you did not take your medications as prescribed because of cost?: No    Allergies reviewed with patient and updates made in EHR: yes    Medication History Completed By: Margaret Cassidy 7/24/2023 9:57 PM    Prior to Admission medications    Medication Sig Last Dose Taking? Auth Provider Long Term End Date   acetaminophen (TYLENOL) 500 MG tablet TAKE 1 PILL BY MOUTH EVERY 6 HOURS AS NEEDED FOR PAIN  Patient taking differently: Take 500 mg by mouth every 6 hours as needed for mild pain 7/24/2023 at am Yes Adrien Cardoza MD     albuterol (PROAIR HFA/PROVENTIL HFA/VENTOLIN HFA) 108 (90 Base) MCG/ACT inhaler Inhale 2 puffs into the lungs every 4 hours as needed for shortness of breath / dyspnea 7/24/2023 at am Yes Tamra Duong MD Yes    albuterol (PROVENTIL) (2.5 MG/3ML) 0.083% neb solution Take 1 vial (2.5 mg) by nebulization every 6 hours as needed 7/24/2023 at pm Yes Tamra Duong MD Yes    ALLERGY RELIEF 10  MG tablet TAKE 1 TABLET (10 MG TOTAL) BY MOUTH DAILY FOR ALLERGIES  Patient taking differently: Take 10 mg by mouth daily 7/24/2023 at am Yes Elizabeth Marie MD     amitriptyline (ELAVIL) 50 MG tablet Take 1 tablet (50 mg) by mouth At Bedtime 7/23/2023 at pm Yes Guerrero Prescott MD Yes    ASPIRIN LOW DOSE 81 MG EC tablet TAKE 1 TABLET (81 MG TOTAL) BY MOUTH DAILY. 7/24/2023 at am Yes Adrien Cardoza MD     atorvastatin (LIPITOR) 80 MG tablet TAKE 1 TABLET (80 MG TOTAL) BY MOUTH AT BEDTIME FOR CHOLESTEROL  Patient taking differently: Take 80 mg by mouth daily 7/23/2023 at pm Yes Adrien Cardoza MD Yes    calcium carbonate (TUMS) 500 MG chewable tablet Take 1 tablet (500 mg) by mouth 2 times daily as needed for heartburn 7/24/2023 at am Yes Ronna Ramirez MD     cyclobenzaprine (FLEXERIL) 5 MG tablet Take 5 mg by mouth 3 times daily as needed for muscle spasms 7/24/2023 at am Yes Reported, Patient     dextromethorphan (DELSYM) 30 MG/5ML liquid Take 10 mLs (60 mg) by mouth 2 times daily as needed for cough 7/21/2023 at am Yes Ellen Harris, NP     diclofenac (VOLTAREN) 1 % topical gel Apply 4 g topically 4 times daily 7/24/2023 at am Yes Daily Goldberg, CNP     docusate sodium (COLACE) 100 MG capsule Take 1 capsule (100 mg) by mouth 2 times daily as needed for constipation 7/24/2023 at am Yes Adrien Cardoza MD     dupilumab (DUPIXENT) 300 MG/2ML prefilled pen Inject 2 mLs (300 mg) Subcutaneous every 14 days 7/17/2023 at unknown Yes Elizabeth Marie MD     famotidine (PEPCID) 20 MG tablet Take 20 mg by mouth 2 times daily 7/24/2023 at am Yes Reported, Patient     FLUoxetine (PROZAC) 10 MG capsule TAKE 1 CAPSULE (10 MG) BY MOUTH DAILY 7/24/2023 at am Yes Adrien Cardoza MD Yes    fluticasone-vilanterol (BREO ELLIPTA) 200-25 MCG/ACT inhaler Inhale 1 puff into the lungs daily 7/24/2023 at am Yes Tamra Duong MD     gabapentin (NEURONTIN) 300 MG capsule Take 2 capsules (600 mg) by mouth 3 times daily 7/24/2023 at am Yes Nani  MD Adrien Yes    hydrochlorothiazide (MICROZIDE) 12.5 MG capsule TAKE 1 CAPSULE (12.5 MG TOTAL) BY MOUTH DAILY FOR BLOOD PRESSURE 7/24/2023 at am Yes Adrien Cardoza MD Yes    ibuprofen (ADVIL/MOTRIN) 600 MG tablet Take 600 mg by mouth every 6 hours as needed for moderate pain Past Month Yes Reported, Patient     insulin aspart (NOVOLOG PEN) 100 UNIT/ML pen Inject 10 Units Subcutaneous 2 times daily (with meals) 7/24/2023 at am Yes Adrien Cardoza MD Yes    insulin glargine (LANTUS PEN) 100 UNIT/ML pen Inject 40 Units Subcutaneous every morning 7/23/2023 at pm Yes Adrien Cardoza MD Yes    ipratropium - albuterol 0.5 mg/2.5 mg/3 mL (DUONEB) 0.5-2.5 (3) MG/3ML neb solution Take 1 vial (3 mLs) by nebulization every 6 hours as needed for shortness of breath / dyspnea or wheezing 7/24/2023 at am Yes Tamra Duong MD Yes    meclizine (ANTIVERT) 12.5 MG tablet TAKE 2 TABLETS (25 MG) BY MOUTH 3 TIMES DAILY AS NEEDED FOR DIZZINESS 7/24/2023 at am Yes Adrien Cardoza MD     metFORMIN (GLUCOPHAGE) 1000 MG tablet Take 1 tablet (1,000 mg) by mouth 2 times daily (with meals) 7/24/2023 at am Yes Adrien Cardoza MD Yes    metoprolol tartrate (LOPRESSOR) 25 MG tablet TAKE 1 TABLET (25 MG TOTAL) BY MOUTH 2 (TWO) TIMES A DAY FOR BLOOD PRESSURE 7/24/2023 at am Yes Adrien Cardoza MD Yes    montelukast (SINGULAIR) 10 MG tablet Take 1 tablet (10 mg) by mouth At Bedtime 7/23/2023 at pm Yes Tamra Duong MD Yes    nystatin (MYCOSTATIN) 505376 UNIT/ML suspension Take 5 mLs (500,000 Units) by mouth 4 times daily 7/24/2023 at am Yes Adrien Cardoza MD     omeprazole (PRILOSEC) 40 MG DR capsule Take 1 capsule (40 mg) by mouth daily 7/24/2023 at am Yes Adrien Cardoza MD Yes    Polyethylene Glycol 3350 (PEG 3350) 17 GM/SCOOP POWD MIX 17 GRAMS WITH LIQUID AND DRINK ONCE DAILY FOR CONSTIPATION 7/24/2023 at am Yes Adrien Cardoza MD     predniSONE (DELTASONE) 20 MG tablet Take 2 tablets (40 mg) by mouth daily for 7 days unknown at unknown Yes Ellen Harris, NP No 7/31/23    sucralfate (CARAFATE) 1 GM/10ML suspension Take 10 mLs (1 g) by mouth 4 times daily 7/24/2023 at am Yes Adrien Cardoza MD     tiotropium (SPIRIVA RESPIMAT) 2.5 MCG/ACT inhaler Inhale 2 puffs into the lungs daily 7/24/2023 at am Yes Elizabeth Marie MD Yes    B-D U/F 31G X 8 MM insulin pen needle USE ONE PEN NEEDLE DAILY AS NEEDED FOR SSI   Adrien Cardoza MD     Continuous Blood Gluc Sensor (FREESTYLE MONICA 2 SENSOR) MISC 1 each every 14 days Use 1 sensor every 14 days. Use to read blood sugars per 's instructions.   Adrien Cardoza MD     CONTOUR NEXT TEST test strip USE 1 EACH AS DIRECTED 3 (THREE) TIMES A DAY.   Karissa Dover MD     Global Inject Ease Lancets 30G MISC USE 1 EACH AS DIRECTED 3 (THREE) TIMES A DAY. DISPENSE BRAND PER PATIENT'S INSURANCE AT PHARMACY DISCRETION.(E11.9)   Adrien Cardoza MD     hydrocortisone (CORTAID) 1 % external cream Apply topically 2 times daily  Patient not taking: Reported on 7/24/2023 Not Taking  Adrien Cardoza MD     insulin pen needle (32G X 4 MM) 32G X 4 MM miscellaneous Use one pen needles daily or as directed.   Adrien Cardoza MD     lidocaine (XYLOCAINE) 5 % external ointment Apply topically 3 times daily as needed Small amount (finger tip amount) to chest tid prn pain  Patient not taking: Reported on 7/24/2023 Not Taking  Whit Garcia MD     polyvinyl alcohol (ARTIFICIAL TEARS) 1.4 % ophthalmic solution Place 1 drop into both eyes as needed for dry eyes  Patient not taking: Reported on 7/24/2023 Not Taking  Raymundo Balbuena MD

## 2023-07-25 NOTE — PROGRESS NOTES
"PRIMARY DIAGNOSIS: \"GENERIC\" NURSING  OUTPATIENT/OBSERVATION GOALS TO BE MET BEFORE DISCHARGE:  ADLs back to baseline: Yes    Activity and level of assistance: Ambulating independently.    Pain status: Pain free.    Return to near baseline physical activity: Yes     Discharge Planner Nurse   Safe discharge environment identified: Yes  Barriers to discharge:  cough, and elevate BG       Entered by: Ty Swartz RN 07/25/2023 6:35 AM   Pt denies chest pain at this time. SBA with adls. /59   Pulse 101   Temp 97.9  F (36.6  C) (Oral)   Resp 16   Wt 60.8 kg (134 lb)   SpO2 99%   BMI 21.63 kg/m    on tele, sinus tachy, intermittent non productive cough, nebs available. Elevated BG, 420, 384 both corrected. NPO since MN.  Please review provider order for any additional goals.   Nurse to notify provider when observation goals have been met and patient is ready for discharge.  "

## 2023-07-25 NOTE — TREATMENT PLAN
[facility]AT Treatment Plan    Patient Score: 3  Patient Acuity: 5    Clinical Indication for Therapy: prevent atelectasis    Therapy Ordered: duo neb QID    Assessment Summary: pt is on RA sating 97%, breath sounds diminished. Strong NP cough. Pt uses inhalers at home TID.     Saman Ruffin, RT  7/24/2023

## 2023-07-25 NOTE — PROGRESS NOTES
"PRIMARY DIAGNOSIS: \"GENERIC\" NURSING  OUTPATIENT/OBSERVATION GOALS TO BE MET BEFORE DISCHARGE:  ADLs back to baseline: Yes    Activity and level of assistance: Up with standby assistance.    Pain status: Pain free.    Return to near baseline physical activity: Yes     Discharge Planner Nurse   Safe discharge environment identified: Yes  Barriers to discharge:  NP intermittent cough       Entered by: Ty Swartz RN 07/25/2023 5:10 AM     Please review provider order for any additional goals.   Nurse to notify provider when observation goals have been met and patient is ready for discharge.  "

## 2023-07-25 NOTE — CONSULTS
Care Management Initial Consult    General Information  Assessment completed with: Patient, Children, pt and dtr Billy  Type of CM/SW Visit: Initial Assessment    Primary Care Provider verified and updated as needed: Yes   Readmission within the last 30 days: no previous admission in last 30 days      Reason for Consult: discharge planning  Advance Care Planning: Advance Care Planning Reviewed: verified with patient, no concerns identified          Communication Assessment  Patient's communication style: spoken language (English or Bilingual)             Cognitive  Cognitive/Neuro/Behavioral: WDL                      Living Environment:   People in home: child(inge), adult, spouse     Current living Arrangements: house      Able to return to prior arrangements: yes       Family/Social Support:  Care provided by: child(inge)  Provides care for: no one, no one, unable/limited ability to care for self  Marital Status:   , Children, PCA          Description of Support System: Supportive, Involved    Support Assessment: Adequate family and caregiver support, Adequate social supports    Current Resources:   Patient receiving home care services: No     Community Resources: PCA  Equipment currently used at home:    Supplies currently used at home: None    Employment/Financial:  Employment Status:          Financial Concerns: No concerns identified   Referral to Financial Worker: No       Does the patient's insurance plan have a 3 day qualifying hospital stay waiver?  No    Lifestyle & Psychosocial Needs:  Social Determinants of Health     Tobacco Use: Low Risk  (7/24/2023)    Patient History     Smoking Tobacco Use: Never     Smokeless Tobacco Use: Never     Passive Exposure: Never   Alcohol Use: Not on file   Financial Resource Strain: Not on file   Food Insecurity: Not on file   Transportation Needs: Not on file   Physical Activity: Not on file   Stress: Not on file   Social Connections: Not on file   Intimate  Partner Violence: Not on file   Depression: Not at risk (6/29/2023)    PHQ-2     PHQ-2 Score: 1   Housing Stability: Not on file       Functional Status:  Prior to admission patient needed assistance:   Dependent ADLs:: Ambulation-cane, Ambulation-walker, Wheelchair-with assist, Dressing, Grooming, Bathing, Transfers  Dependent IADLs:: Cleaning, Cooking, Laundry, Shopping, Meal Preparation, Medication Management, Transportation  Assesssment of Functional Status: Not at baseline with mobility, Not at baseline with ADL Functioning    Mental Health Status:  Mental Health Status: No Current Concerns       Chemical Dependency Status:                Values/Beliefs:  Spiritual, Cultural Beliefs, Orthodox Practices, Values that affect care:                 Additional Information:  Assessed, lives w/spouse and children, has PCA 11.5#/day, hx of cardiac stents, speaks Irish. Family can transport. CM to follow for needs.    Aundrea Newell RN

## 2023-07-25 NOTE — PROGRESS NOTES
PRIMARY DIAGNOSIS: CHEST PAIN  OUTPATIENT/OBSERVATION GOALS TO BE MET BEFORE DISCHARGE:  1. Ruled out acute coronary syndrome (negative or stable Troponin):   N/A  2. Pain Status: Improved-controlled with oral pain medications.  3. Appropriate provocative testing performed: N/A  - Stress Test Procedure: N/A   - Interpretation of cardiac rhythm per telemetry tech: ST    4. Cleared by Consultants (if applicable):N/A  5. Return to near baseline physical activity: Yes  Discharge Planner Nurse   Safe discharge environment identified: Yes  Barriers to discharge: Yes       Entered by: Inez Sauceda RN 07/25/2023 5:35 PM  1720 - Pt. A&O x4 and on room air. Has chest pain of 5 around the midsternal area. Pain does not radiate. Pt. Stated that pain was similar to the chest pain that brought her into the hospital. Gave first dose of nitroglycerin and chest pain was still present. Gave second dose after 5 minutes and pt. Was chest pain free. Tele is Sinus Tachy. Pt. Had expiratory wheezes and frequent cough. Pt. Complains of neck pain which tylenol and gauifenesin was given down in ER. Notified on call cardiologist.   1745 - Per cardiology, if pt has chest pain, RN should give morphine or guaifensein in the future.     Please review provider order for any additional goals.   Nurse to notify provider when observation goals have been met and patient is ready for discharge.

## 2023-07-26 VITALS
RESPIRATION RATE: 20 BRPM | OXYGEN SATURATION: 96 % | BODY MASS INDEX: 19.5 KG/M2 | TEMPERATURE: 98.1 F | SYSTOLIC BLOOD PRESSURE: 115 MMHG | HEART RATE: 90 BPM | WEIGHT: 120.8 LBS | DIASTOLIC BLOOD PRESSURE: 65 MMHG

## 2023-07-26 LAB
GLUCOSE BLDC GLUCOMTR-MCNC: 197 MG/DL (ref 70–99)
GLUCOSE BLDC GLUCOMTR-MCNC: 247 MG/DL (ref 70–99)
GLUCOSE BLDC GLUCOMTR-MCNC: 398 MG/DL (ref 70–99)
GLUCOSE BLDC GLUCOMTR-MCNC: 409 MG/DL (ref 70–99)

## 2023-07-26 PROCEDURE — 99232 SBSQ HOSP IP/OBS MODERATE 35: CPT | Performed by: INTERNAL MEDICINE

## 2023-07-26 PROCEDURE — G0378 HOSPITAL OBSERVATION PER HR: HCPCS

## 2023-07-26 PROCEDURE — 99239 HOSP IP/OBS DSCHRG MGMT >30: CPT | Performed by: INTERNAL MEDICINE

## 2023-07-26 PROCEDURE — 999N000157 HC STATISTIC RCP TIME EA 10 MIN

## 2023-07-26 PROCEDURE — 250N000013 HC RX MED GY IP 250 OP 250 PS 637: Performed by: INTERNAL MEDICINE

## 2023-07-26 PROCEDURE — 82962 GLUCOSE BLOOD TEST: CPT

## 2023-07-26 PROCEDURE — 250N000009 HC RX 250

## 2023-07-26 PROCEDURE — 94640 AIRWAY INHALATION TREATMENT: CPT

## 2023-07-26 RX ORDER — FAMOTIDINE 20 MG/1
20 TABLET, FILM COATED ORAL 2 TIMES DAILY
Status: DISCONTINUED | OUTPATIENT
Start: 2023-07-26 | End: 2023-07-26

## 2023-07-26 RX ORDER — MONTELUKAST SODIUM 10 MG/1
10 TABLET ORAL AT BEDTIME
Status: DISCONTINUED | OUTPATIENT
Start: 2023-07-26 | End: 2023-07-26 | Stop reason: HOSPADM

## 2023-07-26 RX ORDER — ALBUTEROL SULFATE 0.83 MG/ML
2.5 SOLUTION RESPIRATORY (INHALATION) EVERY 6 HOURS PRN
Status: DISCONTINUED | OUTPATIENT
Start: 2023-07-26 | End: 2023-07-26 | Stop reason: HOSPADM

## 2023-07-26 RX ORDER — COLCHICINE 0.6 MG/1
0.6 TABLET ORAL 2 TIMES DAILY
Qty: 60 TABLET | Refills: 0 | Status: SHIPPED | OUTPATIENT
Start: 2023-07-26 | End: 2023-08-30

## 2023-07-26 RX ORDER — FLUTICASONE FUROATE AND VILANTEROL 200; 25 UG/1; UG/1
1 POWDER RESPIRATORY (INHALATION) DAILY
Status: DISCONTINUED | OUTPATIENT
Start: 2023-07-26 | End: 2023-07-26 | Stop reason: HOSPADM

## 2023-07-26 RX ORDER — ATORVASTATIN CALCIUM 40 MG/1
80 TABLET, FILM COATED ORAL EVERY EVENING
Status: DISCONTINUED | OUTPATIENT
Start: 2023-07-26 | End: 2023-07-26 | Stop reason: HOSPADM

## 2023-07-26 RX ORDER — IPRATROPIUM BROMIDE AND ALBUTEROL SULFATE 2.5; .5 MG/3ML; MG/3ML
3 SOLUTION RESPIRATORY (INHALATION) EVERY 6 HOURS PRN
Status: DISCONTINUED | OUTPATIENT
Start: 2023-07-26 | End: 2023-07-26 | Stop reason: HOSPADM

## 2023-07-26 RX ORDER — SUCRALFATE ORAL 1 G/10ML
1 SUSPENSION ORAL 4 TIMES DAILY
Status: DISCONTINUED | OUTPATIENT
Start: 2023-07-26 | End: 2023-07-26 | Stop reason: HOSPADM

## 2023-07-26 RX ORDER — POLYETHYLENE GLYCOL 3350 17 G/17G
17 POWDER, FOR SOLUTION ORAL DAILY
Status: DISCONTINUED | OUTPATIENT
Start: 2023-07-26 | End: 2023-07-26 | Stop reason: HOSPADM

## 2023-07-26 RX ORDER — MECLIZINE HYDROCHLORIDE 25 MG/1
25 TABLET ORAL 3 TIMES DAILY PRN
Status: DISCONTINUED | OUTPATIENT
Start: 2023-07-26 | End: 2023-07-26 | Stop reason: HOSPADM

## 2023-07-26 RX ORDER — FLUOXETINE 10 MG/1
10 CAPSULE ORAL DAILY
Status: DISCONTINUED | OUTPATIENT
Start: 2023-07-26 | End: 2023-07-26 | Stop reason: HOSPADM

## 2023-07-26 RX ORDER — METOPROLOL TARTRATE 25 MG/1
25 TABLET, FILM COATED ORAL 2 TIMES DAILY
Status: DISCONTINUED | OUTPATIENT
Start: 2023-07-26 | End: 2023-07-26 | Stop reason: HOSPADM

## 2023-07-26 RX ORDER — ASPIRIN 81 MG/1
81 TABLET ORAL DAILY
Status: DISCONTINUED | OUTPATIENT
Start: 2023-07-26 | End: 2023-07-26 | Stop reason: HOSPADM

## 2023-07-26 RX ORDER — ALBUTEROL SULFATE 90 UG/1
2 AEROSOL, METERED RESPIRATORY (INHALATION) EVERY 4 HOURS PRN
Status: DISCONTINUED | OUTPATIENT
Start: 2023-07-26 | End: 2023-07-26 | Stop reason: HOSPADM

## 2023-07-26 RX ORDER — GABAPENTIN 300 MG/1
600 CAPSULE ORAL 3 TIMES DAILY
Status: DISCONTINUED | OUTPATIENT
Start: 2023-07-26 | End: 2023-07-26

## 2023-07-26 RX ORDER — NYSTATIN 100000/ML
500000 SUSPENSION, ORAL (FINAL DOSE FORM) ORAL 4 TIMES DAILY
Status: DISCONTINUED | OUTPATIENT
Start: 2023-07-26 | End: 2023-07-26 | Stop reason: HOSPADM

## 2023-07-26 RX ADMIN — INSULIN ASPART 3 UNITS: 100 INJECTION, SOLUTION INTRAVENOUS; SUBCUTANEOUS at 04:46

## 2023-07-26 RX ADMIN — METOPROLOL TARTRATE 25 MG: 25 TABLET, FILM COATED ORAL at 14:16

## 2023-07-26 RX ADMIN — NYSTATIN 500000 UNITS: 100000 SUSPENSION ORAL at 14:14

## 2023-07-26 RX ADMIN — PANTOPRAZOLE SODIUM 40 MG: 40 TABLET, DELAYED RELEASE ORAL at 08:13

## 2023-07-26 RX ADMIN — FLUTICASONE FUROATE AND VILANTEROL TRIFENATATE 1 PUFF: 200; 25 POWDER RESPIRATORY (INHALATION) at 14:14

## 2023-07-26 RX ADMIN — GUAIFENESIN AND DEXTROMETHORPHAN 10 ML: 100; 10 SYRUP ORAL at 12:27

## 2023-07-26 RX ADMIN — ACETAMINOPHEN 650 MG: 325 TABLET ORAL at 12:27

## 2023-07-26 RX ADMIN — IPRATROPIUM BROMIDE AND ALBUTEROL SULFATE 3 ML: 2.5; .5 SOLUTION RESPIRATORY (INHALATION) at 16:44

## 2023-07-26 RX ADMIN — GABAPENTIN 600 MG: 300 CAPSULE ORAL at 08:13

## 2023-07-26 RX ADMIN — INSULIN ASPART 5 UNITS: 100 INJECTION, SOLUTION INTRAVENOUS; SUBCUTANEOUS at 12:10

## 2023-07-26 RX ADMIN — GABAPENTIN 600 MG: 300 CAPSULE ORAL at 14:15

## 2023-07-26 RX ADMIN — IPRATROPIUM BROMIDE AND ALBUTEROL SULFATE 3 ML: 2.5; .5 SOLUTION RESPIRATORY (INHALATION) at 11:15

## 2023-07-26 RX ADMIN — MECLIZINE HYDROCHLORIDE 25 MG: 25 TABLET ORAL at 05:10

## 2023-07-26 RX ADMIN — SUCRALFATE 1 G: 1 SUSPENSION ORAL at 14:14

## 2023-07-26 RX ADMIN — FLUOXETINE 10 MG: 10 CAPSULE ORAL at 14:14

## 2023-07-26 RX ADMIN — ACETAMINOPHEN 650 MG: 325 TABLET ORAL at 08:24

## 2023-07-26 RX ADMIN — UMECLIDINIUM 1 PUFF: 62.5 AEROSOL, POWDER ORAL at 14:14

## 2023-07-26 RX ADMIN — MECLIZINE HYDROCHLORIDE 25 MG: 25 TABLET ORAL at 12:27

## 2023-07-26 RX ADMIN — POLYETHYLENE GLYCOL 3350 17 G: 17 POWDER, FOR SOLUTION ORAL at 10:36

## 2023-07-26 RX ADMIN — Medication 81 MG: at 14:14

## 2023-07-26 RX ADMIN — INSULIN ASPART 2 UNITS: 100 INJECTION, SOLUTION INTRAVENOUS; SUBCUTANEOUS at 08:12

## 2023-07-26 RX ADMIN — COLCHICINE 0.6 MG: 0.6 TABLET ORAL at 08:13

## 2023-07-26 RX ADMIN — IPRATROPIUM BROMIDE AND ALBUTEROL SULFATE 3 ML: 2.5; .5 SOLUTION RESPIRATORY (INHALATION) at 07:18

## 2023-07-26 RX ADMIN — FAMOTIDINE 20 MG: 20 TABLET, FILM COATED ORAL at 08:13

## 2023-07-26 ASSESSMENT — ACTIVITIES OF DAILY LIVING (ADL)
ADLS_ACUITY_SCORE: 33

## 2023-07-26 NOTE — PROGRESS NOTES
Cardiology Progress Note    Assessment/Plan:    1. Chest pain consistent with acute pericarditis, tolerating colchicine.  No GI complaints.  Would continue 0.6 mg twice daily for 1 month, then decrease to once daily.  Suggest follow-up with cardiology in 1 month.    We will sign off.  Please call if we can be of further assistance.    Principal Problem:    Chest pain     LOS: 0 days     Subjective:  Seen with telephone .  Reports improvement in chest pain .  Still weak, short of breath, headache.  Reports no bowel movements since admission.      Objective:   Vital signs in last 24 hours:  Vitals:    07/26/23 0739 07/26/23 1115 07/26/23 1123 07/26/23 1154   BP: 105/83   130/66   BP Location: Right arm   Right arm   Patient Position:       Pulse: 100 102 93 116   Resp: 18   18   Temp: 97.9  F (36.6  C)   97.8  F (36.6  C)   TempSrc: Oral   Oral   SpO2: 97% 97% 100% 97%   Weight:         Weight:   Wt Readings from Last 3 Encounters:   07/26/23 54.8 kg (120 lb 12.8 oz)   07/24/23 55.9 kg (123 lb 3.2 oz)   07/10/23 56.7 kg (125 lb)           PHYSICAL EXAM      Respiratory:  Normal breath sounds, No respiratory distress, No wheezing, No chest tenderness.   Cardiovascular: Normal heart rate, Normal rhythm, No murmurs, No rubs, No gallops.   GI:  Bowel sounds normal, Soft, No tenderness, No masses  Extremities: no edema       Cardiographics:   Telemetry sinus rhythm, 60 to 80 bpm.    Echocardiogram yesterday:  The left ventricle is normal in size.  Left ventricular function is normal.The ejection fraction is 55-60%.  No hemodynamically significant valvular abnormalities on 2D or color flow  imaging.    Pharmacologic nuclear stress test 4/2023:     The nuclear stress test is negative for inducible myocardial ischemia or infarction.     The patient is at a low risk of future cardiac ischemic events.     Left ventricular function is hyperdynamic.     The left ventricular ejection fraction at stress is greater than  70%.     A prior study was conducted on 1/22/2019.  This study has changes noted when compared with the prior study. There is now evidence of transient ischemic dilation.     Stress to rest cavity ratio is 1.38.  TID is present quantitatively but not visually.  This is a nonspecific finding that can indicate the presence of subendocardial ischemia and clinical correlation recommended.    Imaging:   CTA chest abdomen pelvis 7/24/2023:CORONARY ARTERY CALCIFICATION: Previous intervention (stents or CABG).  1.  No evidence for aortic dissection or aneurysm.  2.  Mosaic attenuation pattern both lower lung fields suggesting air trapping or small airways/vessel disease. Otherwise no acute pulmonary disease.  3.  No acute disease within the abdomen or pelvis.       Lab Results:   Lab Results   Component Value Date    WBC 12.9 (H) 07/25/2023    HGB 11.5 (L) 07/25/2023    HCT 36.9 07/25/2023     07/25/2023    CHOL 125 03/14/2023    TRIG 214 (H) 03/14/2023    HDL 41 (L) 03/14/2023    ALT 40 07/24/2023    AST 46 (H) 07/24/2023     07/25/2023    BUN 12.4 07/25/2023    CO2 25 07/25/2023    TSH 0.59 07/14/2022    INR 1.02 09/06/2022    MICROALBUR <0.50 05/06/2021     Lab Results   Component Value Date    TROPONINI 0.02 09/06/2022         Ja Kramer MD [facility]  7/26/2023

## 2023-07-26 NOTE — PLAN OF CARE
Problem: Chest Pain  Goal: Resolution of Chest Pain Symptoms  Outcome: Progressing     Goal Outcome Evaluation:         PRIMARY DIAGNOSIS: CHEST PAIN  OUTPATIENT/OBSERVATION GOALS TO BE MET BEFORE DISCHARGE:  1. Ruled out acute coronary syndrome (negative or stable Troponin):  Yes  2. Pain Status: Improved but still requiring IV narcotics.  3. Appropriate provocative testing performed: Yes  - Stress Test Procedure: Echo  - Interpretation of cardiac rhythm per telemetry tech: NSR to Sinus tach    4. Cleared by Consultants (if applicable):No  5. Return to near baseline physical activity: Yes  Discharge Planner Nurse   Safe discharge environment identified: Yes  Barriers to discharge: Yes       Entered by: Helen Lyons RN 07/26/2023 6:44 AM     Please review provider order for any additional goals.   Nurse to notify provider when observation goals have been met and patient is ready for discharge.

## 2023-07-26 NOTE — UTILIZATION REVIEW
Concurrent stay review; Secondary Review Determination     Under the authority of the Utilization Management Committee, the utilization review process indicated a secondary review on Kulwant Amin.  The review outcome is based on review of the medical records, discussions with staff, and applying clinical experience noted on the date of the review.        (x) Observation Status Appropriate - Concurrent stay review    RATIONALE FOR DETERMINATION   Hold female admitted with pericarditis.  Seen by cardiology.  Started on colchicine.  Vitals are normal, no IV medications.  Echo shows no acute abnormalities  Not on IV medications,, no significant lab abnormalities other than some leukocytosis thought to be stress-induced from steroids.  Blood sugars have been running high.    Patient is clinically improving and there is no clear indication to change patient's status to inpatient. The severity of illness, intensity of service provided, expected LOS and risk for adverse outcome make the care appropriate for observation.    The information on this document is developed by the utilization review team in order for the business office to ensure compliance.  This only denotes the appropriateness of proper admission status and does not reflect the quality of care rendered.         The definitions of Inpatient Status and Observation Status used in making the determination above are those provided in the CMS Coverage Manual, Chapter 1 and Chapter 6, section 70.4.      Sincerely,   Silvano Ritchie MD  Utilization Review  Physician Advisor  [facility]

## 2023-07-26 NOTE — DISCHARGE SUMMARY
[facility]  Hospitalist Discharge Summary      Date of Admission:  7/24/2023  Date of Discharge:  No discharge date for patient encounter.  Discharging Provider: Kathe Del Valle MD  Discharge Service: Hospitalist Service    Discharge Diagnoses   Pericarditis  COPD exacerbation  Diabetes type 2    Clinically Significant Risk Factors     # DMII: A1C = 7.9 % (Ref range: 0.0 - 5.6 %) within past 6 months       Follow-ups Needed After Discharge   Follow-up Appointments     Follow-up and recommended labs and tests       Follow up with primary care provider, Adrien Cardoza, within 7 days for   hospital follow- up.  No follow up labs or test are needed.  Follow-up   with cardiology in 1 month            Unresulted Labs Ordered in the Past 30 Days of this Admission       No orders found from 6/24/2023 to 7/25/2023.        These results will be followed up by primary care provider    Discharge Disposition   Discharged to home  Condition at discharge: Stable    Hospital Course   Kulwant Amin is a 53 year old female with PMH significant for HTN, HLP, COPD, CVA and DM2 who is admitted on 7/24/2023 with chest pain found to have acute pericarditis.  Cardiology team was consulted, patient started on colchicine 0.6 mg twice a day.  Echocardiogram is unremarkable, patient was cleared for discharge today by cardiology service.  Further outpatient follow-up with cardiology team in 1 month.  Patient was agreeable to discharge home today, July further outpatient follow with her primary care physician as needed.      Consultations This Hospital Stay   CARDIOLOGY IP CONSULT  CARE MANAGEMENT / SOCIAL WORK IP CONSULT    Code Status   Full Code    Time Spent on this Encounter   I, Kathe Del Valle MD, personally saw the patient today and spent greater than 30 minutes discharging this patient.       Kathe Del Valle MD  [facility] HEART CARE  [facility] [facility] [address]  Phone:  [phone]  Fax: [phone]  ______________________________________________________________________    Physical Exam   Vital Signs: Temp: 98.1  F (36.7  C) Temp src: Oral BP: 115/65 Pulse: 90   Resp: 20 SpO2: 96 % O2 Device: None (Room air)    Weight: 120 lbs 12.8 oz    Constitutional: awake, alert, cooperative, no apparent distress, and appears stated age  Respiratory: No increased work of breathing, good air exchange, clear to auscultation bilaterally, no crackles or wheezing  Cardiovascular: Normal apical impulse, regular rate and rhythm, normal S1 and S2, no S3 or S4, and no murmur noted  GI: No scars, normal bowel sounds, soft, non-distended, non-tender, no masses palpated, no hepatosplenomegally  Neuropsychiatric: General: normal, calm, and normal eye contact  Level of consciousness: alert / normal  Affect: normal       Primary Care Physician   Adrien Cardoza    Discharge Orders      Reason for your hospital stay    Chest pain found to have pericarditis     Follow-up and recommended labs and tests     Follow up with primary care provider, Adrien Cardoza, within 7 days for hospital follow- up.  No follow up labs or test are needed.  Follow-up with cardiology in 1 month     Activity    Your activity upon discharge: activity as tolerated     Diet    Follow this diet upon discharge: Orders Placed This Encounter      Moderate Consistent Carb (60 g CHO per Meal) Diet       Significant Results and Procedures   Results for orders placed or performed during the hospital encounter of 07/24/23   CTA Chest Abdomen Pelvis w Contrast    Narrative    EXAM: CTA CHEST ABDOMEN PELVIS W CONTRAST  LOCATION: [facility]  DATE: 7/24/2023    INDICATION: chest pain with radiation into back  COMPARISON: CT abdomen and pelvis 11/24/2022  TECHNIQUE: CT angiogram chest abdomen pelvis during arterial phase of injection of IV contrast. 2D and 3D MIP reconstructions were performed by the CT technologist. Dose reduction  techniques were used.   CONTRAST: isovue 370 90ml    FINDINGS:   CT ANGIOGRAM CHEST, ABDOMEN, AND PELVIS: The thoracic and abdominal aorta are negative for dissection or aneurysm. Moderate atherosclerotic disease thoracic aorta with plaque at the origins of the left clavian and innominate arteries without significant   narrowing. Moderate plaque lower abdominal aorta. Celiac trunk superior and inferior mesenteric arteries, and left renal artery are patent without significant narrowing. Plaque and slight narrowing proximal right common iliac artery the right and left   common and external iliac arteries are widely patent.    LUNGS AND PLEURA: Scarring both lung apices. Mosaic attenuation pattern both lower lobes. No acute infiltrates or pleural effusions. Minimal scarring or atelectasis in the bases.    MEDIASTINUM/AXILLAE: No enlarged mediastinal or hilar nodes.    CORONARY ARTERY CALCIFICATION: Previous intervention (stents or CABG).    HEPATOBILIARY: Normal.    PANCREAS: Normal.    SPLEEN: Normal.    ADRENAL GLANDS: Normal.    KIDNEYS/BLADDER: No renal calculi or hydronephrosis. Distended bladder.    BOWEL: Incidental small air-filled duodenal diverticulum.    LYMPH NODES: Normal.    PELVIC ORGANS: Normal.    MUSCULOSKELETAL: Unremarkable.      Impression    IMPRESSION:  1.  No evidence for aortic dissection or aneurysm.  2.  Mosaic attenuation pattern both lower lung fields suggesting air trapping or small airways/vessel disease. Otherwise no acute pulmonary disease.  3.  No acute disease within the abdomen or pelvis.     Echocardiogram Complete     Value    LVEF  55-60%    [facility]    [id number]  [id number]  [id number]  [id number]^[place] [address]     Name: FRANCIA MATTSON  MRN: [id number]  : 1970  Study Date: 2023 09:21 AM  Age: 53 yrs  Gender: Female  Patient Location: [facility]  Reason For Study: Chest Pain  Ordering Physician: ILEANA  JOSEF  Performed By: MICHAEL     BSA: 0.94 m2  Height: 66 in  Weight: 34 lb  HR: 105  BP: 118/74 mmHg  ______________________________________________________________________________  Procedure  Complete Portable Echo Adult. Poor quality two-dimensional was performed and  interpreted. Poor quality color and spectral Doppler were performed and  interpreted. No hemodynamically significant valvular abnormalities on 2D or  color flow imaging.  ______________________________________________________________________________  Interpretation Summary     The left ventricle is normal in size.  Left ventricular function is normal.The ejection fraction is 55-60%.  No hemodynamically significant valvular abnormalities on 2D or color flow  imaging.  ______________________________________________________________________________  Left Ventricle  The left ventricle is normal in size. Left ventricular function is normal.The  ejection fraction is 55-60%. There is normal left ventricular wall thickness.  Left ventricular diastolic function is normal. No regional wall motion  abnormalities noted.     Right Ventricle  The right ventricle is not well visualized.     Atria  Normal left atrial size. Right atrium not well visualized. There is no color  Doppler evidence of an atrial shunt.     Mitral Valve  Mitral valve leaflets appear normal. There is no evidence of mitral stenosis  or clinically significant mitral regurgitation.     Tricuspid Valve  The tricuspid valve is not well visualized.     Aortic Valve  Aortic valve leaflets appear normal. There is no evidence of aortic stenosis  or clinically significant aortic regurgitation.     Pulmonic Valve  The pulmonic valve is not well visualized.     Vessels  The aorta root is normal. Normal size ascending aorta. IVC diameter <2.1 cm  collapsing >50% with sniff suggests a normal RA pressure of 3 mmHg.     Pericardium  There is no pericardial effusion.      ______________________________________________________________________________  MMode/2D Measurements & Calculations  IVSd: 0.80 cm  LVIDd: 3.7 cm  LVIDs: 2.1 cm  LVPWd: 0.90 cm  FS: 43.2 %     LV mass(C)d: 89.5 grams  LV mass(C)dI: 95.0 grams/m2  Ao root diam: 3.0 cm  asc Aorta Diam: 3.5 cm  LVOT diam: 2.1 cm  LVOT area: 3.5 cm2  LA Volume (BP): 32.7 ml  LA Volume Index (BP): 34.8 ml/m2     LA Volume Indexed (AL/bp): 39.1 ml/m2  RWT: 0.49  TAPSE: 2.8 cm     Doppler Measurements & Calculations  MV E max jonathan: 75.1 cm/sec  MV A max jonathan: 95.4 cm/sec  MV E/A: 0.79  MV dec slope: 610.0 cm/sec2  MV dec time: 0.12 sec  Ao V2 max: 145.0 cm/sec  Ao max P.0 mmHg  Ao V2 mean: 112.0 cm/sec  Ao mean P.0 mmHg  Ao V2 VTI: 26.0 cm  LUCERO(I,D): 3.0 cm2  LUCERO(V,D): 3.2 cm2  LV V1 max P.4 mmHg  LV V1 max: 136.0 cm/sec  LV V1 VTI: 22.2 cm  SV(LVOT): 76.9 ml  SI(LVOT): 81.6 ml/m2  PA V2 max: 72.1 cm/sec  PA max P.1 mmHg  PA acc time: 0.11 sec  AV Jonathan Ratio (DI): 0.94  LUCERO Index (cm2/m2): 3.1     E/E' avg: 10.5  Lateral E/e': 9.9  Medial E/e': 11.1  RV S Jonathan: 16.2 cm/sec     ______________________________________________________________________________  Report approved by: Mary Sanchez 2023 03:26 PM           *Note: Due to a large number of results and/or encounters for the requested time period, some results have not been displayed. A complete set of results can be found in Results Review.       Discharge Medications   Current Discharge Medication List        START taking these medications    Details   colchicine (COLCRYS) 0.6 MG tablet Take 1 tablet (0.6 mg) by mouth 2 times daily  Qty: 60 tablet, Refills: 0    Associated Diagnoses: Chest pain, unspecified type           CONTINUE these medications which have NOT CHANGED    Details   acetaminophen (TYLENOL) 500 MG tablet TAKE 1 PILL BY MOUTH EVERY 6 HOURS AS NEEDED FOR PAIN  Qty: 100 tablet, Refills: 10    Associated Diagnoses: Dental infection       albuterol (PROAIR HFA/PROVENTIL HFA/VENTOLIN HFA) 108 (90 Base) MCG/ACT inhaler Inhale 2 puffs into the lungs every 4 hours as needed for shortness of breath / dyspnea  Qty: 4 g, Refills: 11    Comments: Pharmacy may dispense brand covered by insurance (Proair, or proventil or ventolin or generic albuterol inhaler)  Associated Diagnoses: SOB (shortness of breath)      albuterol (PROVENTIL) (2.5 MG/3ML) 0.083% neb solution Take 1 vial (2.5 mg) by nebulization every 6 hours as needed  Qty: 90 mL, Refills: 11    Associated Diagnoses: Chronic obstructive pulmonary disease, unspecified COPD type (H)      ALLERGY RELIEF 10 MG tablet TAKE 1 TABLET (10 MG TOTAL) BY MOUTH DAILY FOR ALLERGIES  Qty: 30 tablet, Refills: 1    Associated Diagnoses: Environmental allergies      amitriptyline (ELAVIL) 50 MG tablet Take 1 tablet (50 mg) by mouth At Bedtime  Qty: 90 tablet, Refills: 1    Associated Diagnoses: Medication overuse headache; Intractable chronic migraine without aura and without status migrainosus      ASPIRIN LOW DOSE 81 MG EC tablet TAKE 1 TABLET (81 MG TOTAL) BY MOUTH DAILY.  Qty: 90 tablet, Refills: 3    Associated Diagnoses: Encounter for medication refill      atorvastatin (LIPITOR) 80 MG tablet TAKE 1 TABLET (80 MG TOTAL) BY MOUTH AT BEDTIME FOR CHOLESTEROL  Qty: 30 tablet, Refills: 3    Associated Diagnoses: Encounter for medication refill      calcium carbonate (TUMS) 500 MG chewable tablet Take 1 tablet (500 mg) by mouth 2 times daily as needed for heartburn  Qty: 30 tablet, Refills: 0      cyclobenzaprine (FLEXERIL) 5 MG tablet Take 5 mg by mouth 3 times daily as needed for muscle spasms      dextromethorphan (DELSYM) 30 MG/5ML liquid Take 10 mLs (60 mg) by mouth 2 times daily as needed for cough  Qty: 178 mL, Refills: 0    Associated Diagnoses: Acute cough      diclofenac (VOLTAREN) 1 % topical gel Apply 4 g topically 4 times daily  Qty: 350 g, Refills: 3    Associated Diagnoses: Pain in both knees,  unspecified chronicity      docusate sodium (COLACE) 100 MG capsule Take 1 capsule (100 mg) by mouth 2 times daily as needed for constipation  Qty: 90 capsule, Refills: 3    Associated Diagnoses: Constipation, unspecified constipation type      dupilumab (DUPIXENT) 300 MG/2ML prefilled pen Inject 2 mLs (300 mg) Subcutaneous every 14 days  Qty: 4 mL, Refills: 5    Associated Diagnoses: Severe persistent asthma without complication      famotidine (PEPCID) 20 MG tablet Take 20 mg by mouth 2 times daily      FLUoxetine (PROZAC) 10 MG capsule TAKE 1 CAPSULE (10 MG) BY MOUTH DAILY  Qty: 90 capsule, Refills: 2    Associated Diagnoses: Anxiety      fluticasone-vilanterol (BREO ELLIPTA) 200-25 MCG/ACT inhaler Inhale 1 puff into the lungs daily  Qty: 60 each, Refills: 11    Associated Diagnoses: Severe persistent asthma without complication      gabapentin (NEURONTIN) 300 MG capsule Take 2 capsules (600 mg) by mouth 3 times daily  Qty: 540 capsule, Refills: 3    Associated Diagnoses: Chronic bilateral low back pain with bilateral sciatica      hydrochlorothiazide (MICROZIDE) 12.5 MG capsule TAKE 1 CAPSULE (12.5 MG TOTAL) BY MOUTH DAILY FOR BLOOD PRESSURE  Qty: 90 capsule, Refills: 2    Associated Diagnoses: Essential hypertension      ibuprofen (ADVIL/MOTRIN) 600 MG tablet Take 600 mg by mouth every 6 hours as needed for moderate pain      insulin aspart (NOVOLOG PEN) 100 UNIT/ML pen Inject 10 Units Subcutaneous 2 times daily (with meals)  Qty: 15 mL, Refills: 3    Comments: Dose increase  Associated Diagnoses: Type 2 diabetes mellitus treated with insulin (H)      insulin glargine (LANTUS PEN) 100 UNIT/ML pen Inject 40 Units Subcutaneous every morning  Qty: 45 mL, Refills: 3    Comments: If Lantus is not covered by insurance, may substitute Basaglar or Semglee or other insulin glargine product per insurance preference at same dose and frequency.    Associated Diagnoses: Type 2 diabetes mellitus treated with insulin (H)       ipratropium - albuterol 0.5 mg/2.5 mg/3 mL (DUONEB) 0.5-2.5 (3) MG/3ML neb solution Take 1 vial (3 mLs) by nebulization every 6 hours as needed for shortness of breath / dyspnea or wheezing  Qty: 360 mL, Refills: 11    Associated Diagnoses: Chronic obstructive pulmonary disease, unspecified COPD type (H)      meclizine (ANTIVERT) 12.5 MG tablet TAKE 2 TABLETS (25 MG) BY MOUTH 3 TIMES DAILY AS NEEDED FOR DIZZINESS  Qty: 90 tablet, Refills: 0    Associated Diagnoses: Dizziness      metFORMIN (GLUCOPHAGE) 1000 MG tablet Take 1 tablet (1,000 mg) by mouth 2 times daily (with meals)  Qty: 180 tablet, Refills: 1    Associated Diagnoses: Type 2 diabetes mellitus treated with insulin (H)      metoprolol tartrate (LOPRESSOR) 25 MG tablet TAKE 1 TABLET (25 MG TOTAL) BY MOUTH 2 (TWO) TIMES A DAY FOR BLOOD PRESSURE  Qty: 180 tablet, Refills: 3      montelukast (SINGULAIR) 10 MG tablet Take 1 tablet (10 mg) by mouth At Bedtime  Qty: 30 tablet, Refills: 11    Associated Diagnoses: Environmental allergies      nystatin (MYCOSTATIN) 052953 UNIT/ML suspension Take 5 mLs (500,000 Units) by mouth 4 times daily  Qty: 473 mL, Refills: 1    Associated Diagnoses: Oral thrush      omeprazole (PRILOSEC) 40 MG DR capsule Take 1 capsule (40 mg) by mouth daily  Qty: 90 capsule, Refills: 3    Associated Diagnoses: Gastroesophageal reflux disease without esophagitis      Polyethylene Glycol 3350 (PEG 3350) 17 GM/SCOOP POWD MIX 17 GRAMS WITH LIQUID AND DRINK ONCE DAILY FOR CONSTIPATION  Qty: 1530 g, Refills: 3    Associated Diagnoses: Slow transit constipation; Encounter for medication refill      predniSONE (DELTASONE) 20 MG tablet Take 2 tablets (40 mg) by mouth daily for 7 days  Qty: 14 tablet, Refills: 0    Associated Diagnoses: Severe persistent asthma with exacerbation      sucralfate (CARAFATE) 1 GM/10ML suspension Take 10 mLs (1 g) by mouth 4 times daily  Qty: 3600 mL, Refills: 1    Associated Diagnoses: Gastroesophageal reflux disease  without esophagitis      tiotropium (SPIRIVA RESPIMAT) 2.5 MCG/ACT inhaler Inhale 2 puffs into the lungs daily  Qty: 4 g, Refills: 11    Associated Diagnoses: Chronic obstructive pulmonary disease, unspecified COPD type (H)      !! B-D U/F 31G X 8 MM insulin pen needle USE ONE PEN NEEDLE DAILY AS NEEDED FOR SSI  Qty: 100 each, Refills: 1    Associated Diagnoses: Type 2 diabetes mellitus treated without insulin (H)      Continuous Blood Gluc Sensor (FREESTYLE MONICA 2 SENSOR) MISC 1 each every 14 days Use 1 sensor every 14 days. Use to read blood sugars per 's instructions.  Qty: 2 each, Refills: 11    Associated Diagnoses: Type 2 diabetes mellitus treated with insulin (H)      CONTOUR NEXT TEST test strip USE 1 EACH AS DIRECTED 3 (THREE) TIMES A DAY.  Qty: 50 strip, Refills: 11    Associated Diagnoses: Type 2 diabetes mellitus treated with insulin (H)      Global Inject Ease Lancets 30G MISC USE 1 EACH AS DIRECTED 3 (THREE) TIMES A DAY. DISPENSE BRAND PER PATIENT'S INSURANCE AT PHARMACY DISCRETION.(E11.9)  Qty: 100 each, Refills: 3    Associated Diagnoses: Encounter for medication refill      hydrocortisone (CORTAID) 1 % external cream Apply topically 2 times daily  Qty: 60 g, Refills: 0    Associated Diagnoses: Vaginal dryness, menopausal      !! insulin pen needle (32G X 4 MM) 32G X 4 MM miscellaneous Use one pen needles daily or as directed.  Qty: 200 each, Refills: 11    Associated Diagnoses: Type 2 diabetes mellitus treated with insulin (H)      lidocaine (XYLOCAINE) 5 % external ointment Apply topically 3 times daily as needed Small amount (finger tip amount) to chest tid prn pain  Qty: 35.44 g, Refills: 0    Associated Diagnoses: Pain      polyvinyl alcohol (ARTIFICIAL TEARS) 1.4 % ophthalmic solution Place 1 drop into both eyes as needed for dry eyes  Qty: 15 mL, Refills: 3    Associated Diagnoses: Eye pain, bilateral       !! - Potential duplicate medications found. Please discuss with provider.         Allergies   No Known Allergies

## 2023-07-26 NOTE — PROGRESS NOTES
Care Management Follow Up    Length of Stay (days): 0    Expected Discharge Date: 07/27/2023    Concerns to be Addressed:   Pericarditis; IV analgesia, MD reviewing.   Patient plan of care discussed at interdisciplinary rounds: Yes    Anticipated Discharge Disposition:  Home.     Anticipated Discharge Services:  None (resumed PCA).  Anticipated Discharge DME:  Per therapy (if indicated). Has a walker and wheelchair at home.     Patient/family educated on Medicare website which has current facility and service quality ratings:  NA  Education Provided on the Discharge Plan:   Per team  Patient/Family in Agreement with the Plan:   Yes    Referrals Placed by CM/SW:  None  Private pay costs discussed: Not applicable at this time.     Additional Information:  Patient's primary language is Polish.  She lives at home with her spouse and children. She has PCA 11.5 hours per day. She needs assist with activities of daily living and instrumental activities of daily living (IADLs) at baseline. The discharge goal is home. Family plans to transport.     Manasa Enriquez RN

## 2023-07-26 NOTE — PROGRESS NOTES
PRIMARY DIAGNOSIS: CHEST PAIN  OUTPATIENT/OBSERVATION GOALS TO BE MET BEFORE DISCHARGE:  1. Ruled out acute coronary syndrome (negative or stable Troponin):  Yes  2. Pain Status: Improved-controlled with oral pain medications.  3. Appropriate provocative testing performed: Yes  - Stress Test Procedure: Echo  - Interpretation of cardiac rhythm per telemetry tech: SR     4. Cleared by Consultants (if applicable):No  5. Return to near baseline physical activity: Yes  Discharge Planner Nurse   Safe discharge environment identified: Yes  Barriers to discharge: Yes       Entered by: Inez Sauceda RN 07/26/2023 9:59 AM  Pt. A&O x4 and on room air. Pt. Complained of chest and neck pain of 8 which tylenol was given (see MAR). Pt. Has dizziness but was given Antivert at 0510 (see MAR). Pt. Has some baseline numbness in both legs that are pre-existing. Pt. Denies tingling or tenderness anywhere. Pt. Felt constipated (last BM 7/23/2023) and wanted some Laxative. Hospitalist notified. ( was used to communicate with patient.)  Please review provider order for any additional goals.   Nurse to notify provider when observation goals have been met and patient is ready for discharge.

## 2023-07-26 NOTE — PROGRESS NOTES
RESPIRATORY CARE NOTE:    PT is currently on room air with an SpO2 of 97%.  Breathing pattern unlabored, regular Breath sounds diminished with exp wheezes Cough type frequent, non-productive, dry.  Duoneb nebulizer given x2.  PT tolerated treatments well.  RT will continue to follow.      /66 (BP Location: Right arm)   Pulse 116   Temp 97.8  F (36.6  C) (Oral)   Resp 18   Wt 54.8 kg (120 lb 12.8 oz)   SpO2 97%   BMI 19.50 kg/m      Joe Winkler, RT  7/26/2023

## 2023-07-26 NOTE — PROGRESS NOTES
Respiratory distress not noted but BS diminished/tight. Aerations increased post Duoneb treatment. On room air, pt sats 100%. RT following.    Ricardo Martinez, RT

## 2023-07-26 NOTE — PROGRESS NOTES
PRIMARY DIAGNOSIS: CHEST PAIN  OUTPATIENT/OBSERVATION GOALS TO BE MET BEFORE DISCHARGE:  1. Ruled out acute coronary syndrome (negative or stable Troponin):  Yes  2. Pain Status: Improved-controlled with oral pain medications.  3. Appropriate provocative testing performed: Yes  - Stress Test Procedure: Echo  - Interpretation of cardiac rhythm per telemetry tech: SR    4. Cleared by Consultants (if applicable):Yes  5. Return to near baseline physical activity: Yes  Discharge Planner Nurse   Safe discharge environment identified: Yes  Barriers to discharge: No       Entered by: Inez Sauceda RN 07/26/2023 4:04 PM  Pt. A&O x4 and on room air. Denies pain.   1715 - Discharge information given to pt. And family. Medication and upcoming appointments explained. IV taken out. Belongings returned to pt. All questions were answered.   Please review provider order for any additional goals.   Nurse to notify provider when observation goals have been met and patient is ready for discharge.

## 2023-07-26 NOTE — PROGRESS NOTES
[facility]    Medicine Progress Note - Hospitalist Service    Date of Admission:  7/24/2023    Assessment & Plan   Kulwant Amin is a 53 year old female with PMH significant for HTN, HLP, COPD, CVA and DM2 who is admitted on 7/24/2023 with chest pain and COPD exacerbation.     Chest Pain, suspected pericarditis- Admit patient for observation. Initial troponin <6, will repeat in 6 hours. Abnormal EKG. ED physician contacted Cardiologist on call and patient is NOT STEMI at this time. Give ASA 324mg PO x 1 dose. PRN NTG and morphine. Supplemental O2. Hold off on BB due to borderline hypotension. Continue telemetry monitoring. Make NPO after MN. Check Echocardiogram.  Patient cardiology recommendations, continue colchicine as ordered     2. COPD exacerbation- Duonebs and Supplemental O2. S/p Solumedrol x 1 dose in ED.     3. Leukocytosis- No acute pulmonary disease seen on CTA chest. Afebrile. Possible stress induced. Steroids received after blood drawn. Repeat CBC in a.m.     4. DM2- Place on ISS and monitor accuchecks. HBA1C= 7.9 on 7/10/2023.       Diet: Moderate Consistent Carb (60 g CHO per Meal) Diet    DVT Prophylaxis: Pneumatic Compression Devices  Collins Catheter: Not present  Lines: None     Cardiac Monitoring: ACTIVE order. Indication: Chest pain/ ACS rule out (24 hours)  Code Status: Full Code      Clinically Significant Risk Factors Present on Admission                # Drug Induced Platelet Defect: home medication list includes an antiplatelet medication   # Hypertension: Noted on problem list     # DMII: A1C = 7.9 % (Ref range: 0.0 - 5.6 %) within past 6 months             Disposition Plan      Expected Discharge Date: 07/27/2023                  Kathe Del Valle MD  Hospitalist Service  [facility]  Securely message with [employer] (more info)  Text page via [email or web address] Paging/Directory   ______________________________________________________________________    Interval  History   Patient was seen and examined, overnight events were reviewed  Patient has no specific complaints, denies any chest pain shortness of breath    Physical Exam   Vital Signs: Temp: 97.8  F (36.6  C) Temp src: Oral BP: 130/66 Pulse: 116   Resp: 18 SpO2: 97 % O2 Device: None (Room air)    Weight: 120 lbs 12.8 oz    Constitutional: awake, alert, cooperative, no apparent distress, and appears stated age  Respiratory: No increased work of breathing, good air exchange, clear to auscultation bilaterally, no crackles or wheezing  Cardiovascular: Normal apical impulse, regular rate and rhythm, normal S1 and S2, no S3 or S4, and no murmur noted  GI: No scars, normal bowel sounds, soft, non-distended, non-tender, no masses palpated, no hepatosplenomegally  Neuropsychiatric: General: normal, calm, and normal eye contact  Level of consciousness: alert / normal  Affect: normal    Medical Decision Making       35 MINUTES SPENT BY ME on the date of service doing chart review, history, exam, documentation & further activities per the note.      Data         Imaging results reviewed over the past 24 hrs:   No results found for this or any previous visit (from the past 24 hour(s)).

## 2023-07-26 NOTE — PLAN OF CARE
Goal Outcome Evaluation:                  PRIMARY DIAGNOSIS: CHEST PAIN  OUTPATIENT/OBSERVATION GOALS TO BE MET BEFORE DISCHARGE:  1. Ruled out acute coronary syndrome (negative or stable Troponin):  Yes  2. Pain Status: Improved but still requiring IV narcotics.  3. Appropriate provocative testing performed: Yes  - Stress Test Procedure: Echo  - Interpretation of cardiac rhythm per telemetry tech: SR to sinus Tach    4. Cleared by Consultants (if applicable):No  5. Return to near baseline physical activity: Yes  Discharge Planner Nurse   Safe discharge environment identified: Yes  Barriers to discharge: Yes       Entered by: Helen Lyons RN 07/26/2023 6:45 AM   VSS overnight, pt. Complained of chest pain that was controlled by IV morphine.  Pt. Has increased dizziess overnight.  Pt. Stated she normally takes Antivert, crosscover paged and medication order received.  Blood sugars elevated and covered with q4 insuline.  Robitussin for cough   Please review provider order for any additional goals.   Nurse to notify provider when observation goals have been met and patient is ready for discharge.

## 2023-07-26 NOTE — PROGRESS NOTES
PRIMARY DIAGNOSIS: CHEST PAIN  OUTPATIENT/OBSERVATION GOALS TO BE MET BEFORE DISCHARGE:  1. Ruled out acute coronary syndrome (negative or stable Troponin):  Yes  2. Pain Status: Improved-controlled with oral pain medications.  3. Appropriate provocative testing performed: Yes  - Stress Test Procedure: Echo  - Interpretation of cardiac rhythm per telemetry tech: SR    4. Cleared by Consultants (if applicable):No  5. Return to near baseline physical activity: Yes  Discharge Planner Nurse   Safe discharge environment identified: Yes  Barriers to discharge: Yes       Entered by: Inez Sauceda RN 07/26/2023 1:26 PM  Pt. A&O x4 and on room air. Pt. Has chest and neck pain of 6 that was similar to AM. Tylenol was given (see MAR). Pt. Has some dizziness, gave PRN Antivert (see MAR). Blood sugar was 398, covered with 5 units of insulin, hospitalitis notified.    Please review provider order for any additional goals.   Nurse to notify provider when observation goals have been met and patient is ready for discharge.

## 2023-07-27 LAB
ATRIAL RATE - MUSE: 97 BPM
ATRIAL RATE - MUSE: 98 BPM
DIASTOLIC BLOOD PRESSURE - MUSE: NORMAL MMHG
DIASTOLIC BLOOD PRESSURE - MUSE: NORMAL MMHG
INTERPRETATION ECG - MUSE: NORMAL
INTERPRETATION ECG - MUSE: NORMAL
P AXIS - MUSE: 45 DEGREES
P AXIS - MUSE: 52 DEGREES
PR INTERVAL - MUSE: 144 MS
PR INTERVAL - MUSE: 146 MS
QRS DURATION - MUSE: 86 MS
QRS DURATION - MUSE: 88 MS
QT - MUSE: 352 MS
QT - MUSE: 354 MS
QTC - MUSE: 449 MS
QTC - MUSE: 449 MS
R AXIS - MUSE: -14 DEGREES
R AXIS - MUSE: -25 DEGREES
SYSTOLIC BLOOD PRESSURE - MUSE: NORMAL MMHG
SYSTOLIC BLOOD PRESSURE - MUSE: NORMAL MMHG
T AXIS - MUSE: 40 DEGREES
T AXIS - MUSE: 51 DEGREES
VENTRICULAR RATE- MUSE: 97 BPM
VENTRICULAR RATE- MUSE: 98 BPM

## 2023-07-28 ENCOUNTER — PATIENT OUTREACH (OUTPATIENT)
Dept: CARE COORDINATION | Facility: CLINIC | Age: 53
End: 2023-07-28
Payer: COMMERCIAL

## 2023-07-28 NOTE — PROGRESS NOTES
[facility] Contact  [facility]/Voicemail     Clinical Data: Transitional Care Management Outreach     Outreach attempted x 2.  Left message on patient's voicemail, providing [facility]'s 24/7 scheduling and nurse triage phone number [phone] ([phone]) for questions/concerns and/or to schedule an appt with an [facility] provider, if they do not have a PCP.      Plan:  [facility] will do no further outreaches at this time.       Crystal Terry  Community Health Worker  [facility], [facility]  [phone]      *Connected Care Resource Team does NOT follow patient ongoing. Referrals are identified based on internal discharge reports and the outreach is to ensure patient has an understanding of their discharge instructions.

## 2023-07-31 ENCOUNTER — TRANSFERRED RECORDS (OUTPATIENT)
Dept: HEALTH INFORMATION MANAGEMENT | Facility: CLINIC | Age: 53
End: 2023-07-31
Payer: COMMERCIAL

## 2023-08-07 ENCOUNTER — TELEPHONE (OUTPATIENT)
Dept: PULMONOLOGY | Facility: CLINIC | Age: 53
End: 2023-08-07
Payer: COMMERCIAL

## 2023-08-07 DIAGNOSIS — J44.9 CHRONIC OBSTRUCTIVE PULMONARY DISEASE, UNSPECIFIED COPD TYPE (H): ICD-10-CM

## 2023-08-07 RX ORDER — PREDNISONE 20 MG/1
TABLET ORAL
Qty: 10 TABLET | Refills: 0 | Status: SHIPPED | OUTPATIENT
Start: 2023-08-07 | End: 2023-09-13

## 2023-08-07 RX ORDER — ALBUTEROL SULFATE 0.83 MG/ML
2.5 SOLUTION RESPIRATORY (INHALATION) EVERY 6 HOURS PRN
Qty: 90 ML | Refills: 11 | Status: SHIPPED | OUTPATIENT
Start: 2023-08-07 | End: 2023-09-12

## 2023-08-07 NOTE — TELEPHONE ENCOUNTER
[facility] Call Center    Phone Message    May a detailed message be left on voicemail: yes     Reason for Call: Medication Refill Request    Has the patient contacted the pharmacy for the refill? Yes   Name of medication being requested: Albuterol solution and cough medication   Provider who prescribed the medication: DR Duong  Pharmacy: [facility]  Date medication is needed: asap, cough for x1 week       Action Taken: Other: pulm    Travel Screening: Not Applicable

## 2023-08-08 ENCOUNTER — OFFICE VISIT (OUTPATIENT)
Dept: FAMILY MEDICINE | Facility: CLINIC | Age: 53
End: 2023-08-08
Payer: COMMERCIAL

## 2023-08-08 VITALS
SYSTOLIC BLOOD PRESSURE: 113 MMHG | BODY MASS INDEX: 19.82 KG/M2 | WEIGHT: 123.3 LBS | TEMPERATURE: 98.2 F | DIASTOLIC BLOOD PRESSURE: 74 MMHG | HEIGHT: 66 IN | OXYGEN SATURATION: 100 % | HEART RATE: 92 BPM

## 2023-08-08 DIAGNOSIS — I10 ESSENTIAL HYPERTENSION: ICD-10-CM

## 2023-08-08 DIAGNOSIS — I31.9 PERICARDITIS, UNSPECIFIED CHRONICITY, UNSPECIFIED TYPE: ICD-10-CM

## 2023-08-08 DIAGNOSIS — Z09 HOSPITAL DISCHARGE FOLLOW-UP: Primary | ICD-10-CM

## 2023-08-08 DIAGNOSIS — Z79.4 TYPE 2 DIABETES MELLITUS TREATED WITH INSULIN (H): ICD-10-CM

## 2023-08-08 DIAGNOSIS — J44.9 CHRONIC OBSTRUCTIVE PULMONARY DISEASE, UNSPECIFIED COPD TYPE (H): Chronic | ICD-10-CM

## 2023-08-08 DIAGNOSIS — E11.9 TYPE 2 DIABETES MELLITUS TREATED WITH INSULIN (H): ICD-10-CM

## 2023-08-08 LAB
ERYTHROCYTE [DISTWIDTH] IN BLOOD BY AUTOMATED COUNT: 15.4 % (ref 10–15)
HCT VFR BLD AUTO: 40 % (ref 35–47)
HGB BLD-MCNC: 12 G/DL (ref 11.7–15.7)
MCH RBC QN AUTO: 24.8 PG (ref 26.5–33)
MCHC RBC AUTO-ENTMCNC: 30 G/DL (ref 31.5–36.5)
MCV RBC AUTO: 83 FL (ref 78–100)
PLATELET # BLD AUTO: 220 10E3/UL (ref 150–450)
RBC # BLD AUTO: 4.83 10E6/UL (ref 3.8–5.2)
WBC # BLD AUTO: 6.5 10E3/UL (ref 4–11)

## 2023-08-08 PROCEDURE — 99495 TRANSJ CARE MGMT MOD F2F 14D: CPT | Performed by: FAMILY MEDICINE

## 2023-08-08 PROCEDURE — 85027 COMPLETE CBC AUTOMATED: CPT | Performed by: FAMILY MEDICINE

## 2023-08-08 PROCEDURE — 36415 COLL VENOUS BLD VENIPUNCTURE: CPT | Performed by: FAMILY MEDICINE

## 2023-08-08 ASSESSMENT — PATIENT HEALTH QUESTIONNAIRE - PHQ9
10. IF YOU CHECKED OFF ANY PROBLEMS, HOW DIFFICULT HAVE THESE PROBLEMS MADE IT FOR YOU TO DO YOUR WORK, TAKE CARE OF THINGS AT HOME, OR GET ALONG WITH OTHER PEOPLE: EXTREMELY DIFFICULT
SUM OF ALL RESPONSES TO PHQ QUESTIONS 1-9: 16
SUM OF ALL RESPONSES TO PHQ QUESTIONS 1-9: 16

## 2023-08-08 NOTE — PROGRESS NOTES
"  Hospital Follow-up Visit:    Hospital/Nursing Home/IP Rehab Facility: [facility]  Date of Admission: 07/24/2023  Date of Discharge: 07/26/2023  Reason(s) for Admission: chest pain     Was your hospitalization related to COVID-19? No   Problems taking medications regularly:  None  Medication changes since discharge: one medication prescribed  Problems adhering to non-medication therapy:  None    Summary of hospitalization:  [facility] discharge summary reviewed  Diagnostic Tests/Treatments reviewed.  Follow up needed: Cardiology follow up in 4 weeks from discharge . Daughter in law called but unable make appointment.   Other Healthcare Providers Involved in Patient s Care:         None  Update since discharge: stable.        Chief Complaint   Patient presents with    Hospital F/U       Medications reviewed:  No discrepancies identified.    MED REC REQUIRED  Post Medication Reconciliation Status:  Discharge medications reconciled, continue medications without change     Initial /74   Pulse 92   Temp 98.2  F (36.8  C) (Oral)   Ht 1.676 m (5' 6\")   Wt 55.9 kg (123 lb 4.8 oz)   SpO2 100%   BMI 19.90 kg/m   Estimated body mass index is 19.9 kg/m  as calculated from the following:    Height as of this encounter: 1.676 m (5' 6\").    Weight as of this encounter: 55.9 kg (123 lb 4.8 oz).          SUBJECTIVE: Kulwant Amin is a 53-year-old female with multiple chronic medical conditions here for hospital discharge follow-up.  She went to the ED on 7/24/2023 due to chest pain.  She was discharged on 7/24/2023 with a final diagnosis of pericarditis and COPD exacerbation.  Cardiology was consulted during hospital stay and colchicine was added to her current medications recommendation to follow-up with cardiology in 4 weeks.  Daughter-in-law states she called the cardiology office but unable to make an appointment (unclear reason).  Chest pain is still there but " improving.  She sees pulmonology for COPD and asthma.  No future appointment scheduled.  Will call for appointment.  Last A1c was 7.9 on 7/10/2023.  She is using Lantus 40 units and mealtime insulin 10 units before meals.    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    Microcytic anemia 11/17/2019    S/P coronary artery stent placement 02/02/2018    TB lung, latent     9 mos INH     Patient Active Problem List   Diagnosis    Pulmonary nodule, right    Environmental allergies    TB lung, latent    COPD (chronic obstructive pulmonary disease)/Asthma     Essential hypertension    Cataracts, bilateral    Corneal opacity    Irregular astigmatism    Anxiety    Chronic nonintractable headache, unspecified headache type    Coronary artery disease due to lipid rich plaque    Dizziness    Hyperlipidemia    Moderate major depression (H)    Moderate persistent asthma    Unspecified visual loss    GERD (gastroesophageal reflux disease)    Dental caries    H/O arterial ischemic stroke    Constipation    Dysphagia    Chronic abdominal pain    Insomnia    FLACO (obstructive sleep apnea)- mild (AHI 14)    Chest pain    Chest pain, unspecified type    Type 2 diabetes mellitus treated with insulin (H)       Allergies:  No Known Allergies    History   Smoking Status    Never   Smokeless Tobacco    Never       Review of systems otherwise negative except as listed in HPI.   History   Smoking Status    Never   Smokeless Tobacco     "Never       OBJECTICE: /74   Pulse 92   Temp 98.2  F (36.8  C) (Oral)   Ht 1.676 m (5' 6\")   Wt 55.9 kg (123 lb 4.8 oz)   SpO2 100%   BMI 19.90 kg/m      DATA REVIEWED:  Additional History from Old Records Summarized (2):  Radiology Tests Summarized or Ordered (1):   Labs Reviewed or Ordered (1):       GEN-alert,  in no apparent distress.  HEENT-mucous membranes are moist, neck is supple.  CV-regular rate and rhythm with no murmur.   RESP-lungs are clear today.  ABDOMEN- Soft , not tender.  EXTREM- No edema.  SKIN-normal  PSY-mood is normal.    Hospital discharge follow-up  Staff will assist to make follow-up appointment with cardiology.    Pericarditis, unspecified chronicity, unspecified type  Was seen by cardiology in the hospital, colchicine was added.  Recommended outpatient follow-up.    Essential hypertension  Controlled.      Chronic obstructive pulmonary disease, unspecified COPD type (H)  Daughter-in-law will call pulmonology to make follow-up appointment.    Type 2 diabetes mellitus treated with insulin (H)  No med changes today.  Follow-up as planned.    Adrien Cardoza MD   8/8/2023      Answers submitted by the patient for this visit:  Patient Health Questionnaire (Submitted on 8/8/2023)  If you checked off any problems, how difficult have these problems made it for you to do your work, take care of things at home, or get along with other people?: Extremely difficult  PHQ9 TOTAL SCORE: 16    "

## 2023-08-10 ENCOUNTER — OFFICE VISIT (OUTPATIENT)
Dept: NEUROLOGY | Facility: CLINIC | Age: 53
End: 2023-08-10
Payer: COMMERCIAL

## 2023-08-10 VITALS
BODY MASS INDEX: 19.85 KG/M2 | DIASTOLIC BLOOD PRESSURE: 84 MMHG | WEIGHT: 123 LBS | SYSTOLIC BLOOD PRESSURE: 133 MMHG | HEART RATE: 92 BPM | RESPIRATION RATE: 16 BRPM

## 2023-08-10 DIAGNOSIS — G44.40 MEDICATION OVERUSE HEADACHE: ICD-10-CM

## 2023-08-10 DIAGNOSIS — I63.50 CEREBROVASCULAR ACCIDENT OF RIGHT PONTINE STRUCTURE (H): Primary | ICD-10-CM

## 2023-08-10 DIAGNOSIS — R42 VERTIGO: ICD-10-CM

## 2023-08-10 DIAGNOSIS — G43.719 INTRACTABLE CHRONIC MIGRAINE WITHOUT AURA AND WITHOUT STATUS MIGRAINOSUS: ICD-10-CM

## 2023-08-10 PROCEDURE — 99214 OFFICE O/P EST MOD 30 MIN: CPT | Performed by: PSYCHIATRY & NEUROLOGY

## 2023-08-10 RX ORDER — AMITRIPTYLINE HYDROCHLORIDE 50 MG/1
50 TABLET ORAL AT BEDTIME
Qty: 90 TABLET | Refills: 1 | Status: SHIPPED | OUTPATIENT
Start: 2023-08-10 | End: 2024-03-18

## 2023-08-10 RX ORDER — TOPIRAMATE 25 MG/1
TABLET, FILM COATED ORAL
Qty: 180 TABLET | Refills: 1 | Status: SHIPPED | OUTPATIENT
Start: 2023-08-10 | End: 2023-11-07

## 2023-08-10 NOTE — PROGRESS NOTES
NEUROLOGY OUTPATIENT PROGRESS NOTE  Aug 10, 2023     CHIEF COMPLAINT/REASON FOR VISIT/REASON FOR CONSULT  Patient presents with:  Follow Up    REASON FOR CONSULTATION- Multiple issues    REFERRAL SOURCE   Referred Self  CC  Referred Self    HISTORY OF PRESENT ILLNESS  Kulwant Amin is a 53 year old female seen today for evaluation of multiple issues.    In 2021 she was found to have a right pontine stroke.  Presenting symptoms of vertigo.  No clear etiology for the stroke was identified it was thought to be lacunar.  She does have a history of hypertension, hyperlipidemia, diabetes.  She was supposed to be on Plavix though currently is only on aspirin.  No recurrence of stroke symptoms.  She is presented to the ER since then with vertigo symptoms and her imaging studies have been negative    1.  She reports that the vertigo is there all the time.  She is using meclizine which she finds some benefit with. She has done vestibular rehab without any improvement.    2.  Further complains of headaches.  These are around-the-clock.  He is taking Tylenol every 8 hours to get rid of the headaches.  Headaches are more frontal.  They can be throbbing in nature with photophobia and phonophobia.  She does continue visual auras as well.  She is on amitriptyline at nighttime.  She feels that it might be helping with the headaches.  Does not get good sleep due to COPD.  Headaches started after her stroke.    3.  No other new strokelike symptoms.    4.  Does complain of some lightheadedness especially during the exam today.  Has been put on hydrochlorothiazide by her primary care provider.    5/24/23  Patient returns today  1.  No stroke symptoms.  Is tolerating her stroke medications  2.  Continues to have continuous headache 24/7.  Occasionally it will go away but then come back.  Is still overusing Tylenol.  Generally uses 1 tablet a day but can use up to 3 tablets a day.  Amitriptyline did help with the headaches and now  they are not getting as severe as compared to before.  3.  Still feels dizzy.  Drinking plenty of water.    8/10/23  Patient returns today  1.  No further strokelike symptoms.  Continues to complain of some dizziness.  2.  Headaches have improved with use of the amitriptyline.  She continues to have a very mild continuous headache all the time.  She is only using the Tylenol 1-2 times a week.  The more severe headaches are only 1-2 times a week as well.  Amitriptyline does help without side effects though she would like to try other medications  3.  Drinks plenty of water.  No history of kidney stones.    Previous history is reviewed and this is unchanged.    PAST MEDICAL/SURGICAL HISTORY  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    Microcytic anemia 11/17/2019    S/P coronary artery stent placement 02/02/2018    TB lung, latent     9 mos INH     Patient Active Problem List   Diagnosis    Pulmonary nodule, right    Environmental allergies    TB lung, latent    COPD (chronic obstructive pulmonary disease)/Asthma     Essential hypertension    Cataracts, bilateral    Corneal opacity    Irregular astigmatism    Anxiety    Chronic nonintractable headache, unspecified headache type    Coronary artery disease due to lipid rich plaque    Dizziness    Hyperlipidemia    Moderate major depression (H)    Moderate persistent asthma    Unspecified visual loss    GERD (gastroesophageal reflux disease)    Dental caries     H/O arterial ischemic stroke    Constipation    Dysphagia    Chronic abdominal pain    Insomnia    FLACO (obstructive sleep apnea)- mild (AHI 14)    Chest pain    Chest pain, unspecified type    Type 2 diabetes mellitus treated with insulin (H)   Significant for arthritis, diabetes, high blood pressure, hyperlipidemia, stroke    FAMILY HISTORY  Family History   Problem Relation Age of Onset    Other - See Comments Mother          of an intestinal problem    Ulcers Father          of gastritis    Breast Cancer No family hx of    Negative for stroke    SOCIAL HISTORY  Social History     Tobacco Use    Smoking status: Former     Packs/day: 1.00     Years: 30.00     Pack years: 30.00     Types: Cigarettes     Quit date: 2003     Years since quittin.6     Passive exposure: Never    Smokeless tobacco: Never    Tobacco comments:     No passive exposure   Vaping Use    Vaping Use: Never used   Substance Use Topics    Alcohol use: No    Drug use: No       SYSTEMS REVIEW  Twelve-system ROS was done and other than the HPI this was negative except for back pain, joint pain, numbness and tingling, weakness/paralysis, difficulty walking, balance/coordination problems, dizziness, headaches, anxiety, cardiac/heart problems, respiratory problems, snoring loudly.  No new concerns    MEDICATIONS  acetaminophen (TYLENOL) 500 MG tablet, TAKE 1 PILL BY MOUTH EVERY 6 HOURS AS NEEDED FOR PAIN (Patient taking differently: Take 500 mg by mouth every 6 hours as needed for mild pain)  albuterol (PROAIR HFA/PROVENTIL HFA/VENTOLIN HFA) 108 (90 Base) MCG/ACT inhaler, Inhale 2 puffs into the lungs every 4 hours as needed for shortness of breath / dyspnea  albuterol (PROVENTIL) (2.5 MG/3ML) 0.083% neb solution, Take 1 vial (2.5 mg) by nebulization every 6 hours as needed for shortness of breath, wheezing or cough  ALLERGY RELIEF 10 MG tablet, TAKE 1 TABLET (10 MG TOTAL) BY MOUTH DAILY FOR ALLERGIES (Patient taking differently:  Take 10 mg by mouth daily)  ASPIRIN LOW DOSE 81 MG EC tablet, TAKE 1 TABLET (81 MG TOTAL) BY MOUTH DAILY.  atorvastatin (LIPITOR) 80 MG tablet, TAKE 1 TABLET (80 MG TOTAL) BY MOUTH AT BEDTIME FOR CHOLESTEROL (Patient taking differently: Take 80 mg by mouth daily)  B-D U/F 31G X 8 MM insulin pen needle, USE ONE PEN NEEDLE DAILY AS NEEDED FOR SSI  calcium carbonate (TUMS) 500 MG chewable tablet, Take 1 tablet (500 mg) by mouth 2 times daily as needed for heartburn  colchicine (COLCRYS) 0.6 MG tablet, Take 1 tablet (0.6 mg) by mouth 2 times daily  Continuous Blood Gluc Sensor (FREESTYLE MONICA 2 SENSOR) MISC, 1 each every 14 days Use 1 sensor every 14 days. Use to read blood sugars per 's instructions.  CONTOUR NEXT TEST test strip, USE 1 EACH AS DIRECTED 3 (THREE) TIMES A DAY.  cyclobenzaprine (FLEXERIL) 5 MG tablet, Take 5 mg by mouth 3 times daily as needed for muscle spasms  dextromethorphan (DELSYM) 30 MG/5ML liquid, Take 10 mLs (60 mg) by mouth 2 times daily as needed for cough  diclofenac (VOLTAREN) 1 % topical gel, Apply 4 g topically 4 times daily  docusate sodium (COLACE) 100 MG capsule, Take 1 capsule (100 mg) by mouth 2 times daily as needed for constipation  dupilumab (DUPIXENT) 300 MG/2ML prefilled pen, Inject 2 mLs (300 mg) Subcutaneous every 14 days  famotidine (PEPCID) 20 MG tablet, Take 20 mg by mouth 2 times daily  FLUoxetine (PROZAC) 10 MG capsule, TAKE 1 CAPSULE (10 MG) BY MOUTH DAILY  fluticasone-vilanterol (BREO ELLIPTA) 200-25 MCG/ACT inhaler, Inhale 1 puff into the lungs daily  gabapentin (NEURONTIN) 300 MG capsule, Take 2 capsules (600 mg) by mouth 3 times daily  Global Inject Ease Lancets 30G MISC, USE 1 EACH AS DIRECTED 3 (THREE) TIMES A DAY. DISPENSE BRAND PER PATIENT'S INSURANCE AT PHARMACY DISCRETION.(E11.9)  hydrochlorothiazide (MICROZIDE) 12.5 MG capsule, TAKE 1 CAPSULE (12.5 MG TOTAL) BY MOUTH DAILY FOR BLOOD PRESSURE  hydrocortisone (CORTAID) 1 % external cream, Apply  topically 2 times daily  ibuprofen (ADVIL/MOTRIN) 600 MG tablet, Take 600 mg by mouth every 6 hours as needed for moderate pain  insulin aspart (NOVOLOG PEN) 100 UNIT/ML pen, Inject 10 Units Subcutaneous 2 times daily (with meals)  insulin glargine (LANTUS PEN) 100 UNIT/ML pen, Inject 40 Units Subcutaneous every morning  insulin pen needle (32G X 4 MM) 32G X 4 MM miscellaneous, Use one pen needles daily or as directed.  ipratropium - albuterol 0.5 mg/2.5 mg/3 mL (DUONEB) 0.5-2.5 (3) MG/3ML neb solution, Take 1 vial (3 mLs) by nebulization every 6 hours as needed for shortness of breath / dyspnea or wheezing  lidocaine (XYLOCAINE) 5 % external ointment, Apply topically 3 times daily as needed Small amount (finger tip amount) to chest tid prn pain  meclizine (ANTIVERT) 12.5 MG tablet, TAKE 2 TABLETS (25 MG) BY MOUTH 3 TIMES DAILY AS NEEDED FOR DIZZINESS  metFORMIN (GLUCOPHAGE) 1000 MG tablet, Take 1 tablet (1,000 mg) by mouth 2 times daily (with meals)  metoprolol tartrate (LOPRESSOR) 25 MG tablet, TAKE 1 TABLET (25 MG TOTAL) BY MOUTH 2 (TWO) TIMES A DAY FOR BLOOD PRESSURE  montelukast (SINGULAIR) 10 MG tablet, Take 1 tablet (10 mg) by mouth At Bedtime  nystatin (MYCOSTATIN) 907747 UNIT/ML suspension, Take 5 mLs (500,000 Units) by mouth 4 times daily  omeprazole (PRILOSEC) 40 MG DR capsule, Take 1 capsule (40 mg) by mouth daily  Polyethylene Glycol 3350 (PEG 3350) 17 GM/SCOOP POWD, MIX 17 GRAMS WITH LIQUID AND DRINK ONCE DAILY FOR CONSTIPATION  polyvinyl alcohol (ARTIFICIAL TEARS) 1.4 % ophthalmic solution, Place 1 drop into both eyes as needed for dry eyes  predniSONE (DELTASONE) 20 MG tablet, Take 2 tabs daily x 5 days  sucralfate (CARAFATE) 1 GM/10ML suspension, Take 10 mLs (1 g) by mouth 4 times daily  tiotropium (SPIRIVA RESPIMAT) 2.5 MCG/ACT inhaler, Inhale 2 puffs into the lungs daily    No current facility-administered medications on file prior to visit.       PHYSICAL EXAMINATION  VITALS: /84   Pulse  92   Resp 16   Wt 55.8 kg (123 lb)   BMI 19.85 kg/m    GENERAL: Healthy appearing, alert, no acute distress, normal habitus.  CARDIOVASCULAR: Extremities warm and well perfused. Pulses present.   NEUROLOGICAL:  Patient is awake and oriented to self, place and time.  Attention span is normal.  Memory is grossly intact.  Language is fluent and follows commands appropriately.  Appropriate fund of knowledge. Cranial nerves 2-12 are intact. There is no pronator drift.  Motor exam shows 35 strength in all extremities.  Generalized weakness due to poor effort.  Tone is symmetric bilaterally in upper and lower extremities.  (Previously reflexes are symmetric and 1+ in upper extremities and lower extremities. Sensory exam is grossly intact to light touch, pin prick and vibration.)  Finger to nose and heel to shin is without dysmetria.  Romberg is negative.  Gait is normal and the patient is able to do tandem walk and walk on toes and heels with mild difficulty.  Orthostatic vitals    Exam stable.    DIAGNOSTICS  MRI 2022  IMPRESSION:  1.  No acute intracranial abnormalities identified.  2.  Minor chronic small vessel ischemic change, otherwise unremarkable contrast-enhanced MRI of the brain.    MRA                                                               IMPRESSION:  1.  Normal MRA [place].      MRA neck  IMPRESSION:  1.  Normal neck MRA.    Cardiac event monitor    ECHo  Left ventricular size, wall motion and function are normal. The ejection  fraction is 55-60%.  There is borderline anterior, septal, and apical wall hypokinesis.  Normal right ventricle size and systolic function.  No hemodynamically significant valvular abnormalities on 2D or color flow  Imaging.      CTA 2021  HEAD CT:  1.  No evidence of acute hemorrhage, mass effect, or acute vascular territorial infarction.  2.  Severe left sphenoid sinus disease.     HEAD CTA:   1.  No evidence of proximal large vessel occlusion or flow-limiting  stenosis.  2.  Dorsally directed 2 mm contour irregularity arising from the distal left supraclinoid ICA may represent a tiny posterior communicating artery aneurysm.     NECK CTA:  1.  No hemodynamically significant stenosis based on NASCET criteria.  2.  Mild left V1 segment stenosis.  3.  No evidence for dissection.      MRI 2020  IMPRESSION:  MRI BRAIN:  1. No finding for acute infarct, hemorrhage, or mass.  2. There are a few very small foci of FLAIR hyperintensity within the cerebral white matter, nonspecific but compatible with small areas of gliosis and/or chronic myelin loss.     MRA [place]:  1. Congenital variation of the [place] without vascular cutoff, aneurysm, or flow-limiting stenosis.      MRI 2021  HEAD MRI:   1.  Tiny linear focus of diffusion restriction within the right dorsal diomedes suspicious for acute small vessel infarct. This is new compared to 12/07/2020.  2.  No hemorrhagic transformation or mass effect. No additional infarct identified.  3.  Mild presumed microvascular ischemic change within the cerebral white matter.     HEAD MRA:   1.  No aneurysm, high flow AVM or significant stenosis identified.  2.  Variant [place] anatomy as above.     NECK MRA:  1.  Evaluation degraded by suboptimal timing of the contrast bolus and significant venous contamination the gadolinium bolus MRA.  2.  No hemodynamically significant stenosis in the carotid circulation by NASCET criteria.  3.  Poor visualization of the left vertebral artery origin and proximal left V1 segment. This may be artifactual, but it is difficult to exclude high-grade stenosis in this region.      MRI 2021  IMPRESSION:  1.  No mass, hemorrhage, or recent infarct identified.  2.  Diffusion abnormality involving the right ventral diomedes seen on 01/13/2021 is no longer identified. No definite residual signal abnormality in this location to confirm prior infarct. This may relate to small size and slice  selection.  3.  Inflammatory changes of the paranasal sinuses including bilateral maxillary air-fluid levels. Correlate with clinical evidence for sinusitis.      RELEVANT LABS  Component      Latest Ref Rng & Units 11/30/2022 2/5/2023   WBC      4.0 - 11.0 10e3/uL  9.3   RBC Count      3.80 - 5.20 10e6/uL  4.59   Hemoglobin      11.7 - 15.7 g/dL  11.8   Hematocrit      35.0 - 47.0 %  38.1   MCV      78 - 100 fL  83   MCH      26.5 - 33.0 pg  25.7 (L)   MCHC      31.5 - 36.5 g/dL  31.0 (L)   RDW      10.0 - 15.0 %  15.1 (H)   Platelet Count      150 - 450 10e3/uL  228   % Neutrophils      %  69   % Lymphocytes      %  14   % Monocytes      %  6   % Eosinophils      %  11   % Basophils      %  0   % Immature Granulocytes      %  0   NRBCs per 100 WBC      <1 /100  0   Absolute Neutrophils      1.6 - 8.3 10e3/uL  6.4   Absolute Lymphocytes      0.8 - 5.3 10e3/uL  1.3   Absolute Monocytes      0.0 - 1.3 10e3/uL  0.6   Absolute Eosinophils      0.0 - 0.7 10e3/uL  1.0 (H)   Absolute Basophils      0.0 - 0.2 10e3/uL  0.0   Absolute Immature Granulocytes      <=0.4 10e3/uL  0.0   Absolute NRBCs      10e3/uL  0.0   Sodium      136 - 145 mmol/L  138   Potassium      3.4 - 5.3 mmol/L  4.2   Chloride      98 - 107 mmol/L  102   Carbon Dioxide (CO2)      22 - 29 mmol/L  25   Anion Gap      7 - 15 mmol/L  11   Urea Nitrogen      6.0 - 20.0 mg/dL  10.6   Creatinine      0.51 - 0.95 mg/dL  0.58   Calcium      8.6 - 10.0 mg/dL  9.5   Glucose      70 - 99 mg/dL  156 (H)   GFR Estimate      >60 mL/min/1.73m2  >90   Hemoglobin A1C      0.0 - 5.6 % 8.6 (H)      OUTSIDE RECORDS  Outside referral notes and chart notes were reviewed and pertinent information has been summarized (in addition to the HPI):- Dr. Multani notes reviewed      ER notes above reviewed      IMPRESSION/REPORT/PLAN  Cerebrovascular accident of right pontine structure (H)  Medication overuse headache  Vertigo  Light headed  Intractable chronic migraine without aura  and without status migrainosus    This is a 53 year old female history of right pontine stroke thought to be due to vascular risk factors.  Currently she is on aspirin for stroke prevention.  Further management of vascular risk factors per primary care.  Stable.  No change.    She does complain of vertigo and most likely this is related to her stroke.  Meclizine is currently helping and should continue.  She is tried vestibular rehab without any benefit.  Could be related to headaches also.  There might not be any further treatment options available for this.  Stable.  No change.    In terms of her headaches these are most suggestive of medication overuse headaches since she is overusing Tylenol.  Encourage her not to use the Tylenol more than 2-3 times per week; reemphasized this today.  With cutting down on the Tylenol there has been some improvement.  Amitriptyline at higher doses is also helping.  She still has 1-2 severe headaches a week and would like to try other medications.  Will try Topamax.  There is no previous history of kidney stones.  Tylenol is sufficient for abortive therapy.  Her severe headaches do have migraine features and could be migraine headaches.  Amitriptyline was initially prescribed through primary care.    She does complain of some lightheadedness and orthostatic vitals were negative.  Most likely this is unrelated to the stroke.  Further management per primary care.  Encourage good hydration.  Stable.    I can see her back in 2 to 3 months.    -     topiramate (TOPAMAX) 25 MG tablet; Take 1 tablet at night. Can increase to 2 tabs/night if needed.  -     amitriptyline (ELAVIL) 50 MG tablet; Take 1 tablet (50 mg) by mouth At Bedtime    Return in about 11 weeks (around 10/26/2023) for In-Clinic Visit (must), After testing.    Over 32 minutes were spent coordinating the care for the patient on the day of the encounter.  This includes previsit, during visit and post visit activities as  documented above.  Counseling patient.  Prescription management.  Refractory problem.  Use of  requires extra time.  (Activities include but not inclusive of reviewing chart, reviewing outside records, reviewing labs and imaging study results as well as the images, patient visit time including getting history and exam,  use if applicable, review of test results with the patient and coming up with a plan in a shared model, counseling patient and family, education and answering patient questions, EMR , EMR diagnosis entry and problem list management, medication reconciliation and prescription management if applicable, paperwork if applicable, printing documents and documentation of the visit activities.)        Guerrero Prescott MD  Neurologist  [facility]  Tel:- [phone]    This note was dictated using voice recognition software.  Any grammatical or context distortions are unintentional and inherent to the software.

## 2023-08-24 ENCOUNTER — LAB (OUTPATIENT)
Dept: LAB | Facility: CLINIC | Age: 53
End: 2023-08-24
Payer: COMMERCIAL

## 2023-08-24 ENCOUNTER — OFFICE VISIT (OUTPATIENT)
Dept: ALLERGY | Facility: CLINIC | Age: 53
End: 2023-08-24
Payer: COMMERCIAL

## 2023-08-24 VITALS — WEIGHT: 124 LBS | BODY MASS INDEX: 20.01 KG/M2 | OXYGEN SATURATION: 99 % | HEART RATE: 101 BPM

## 2023-08-24 DIAGNOSIS — J45.50 SEVERE PERSISTENT ASTHMA WITHOUT COMPLICATION (H): ICD-10-CM

## 2023-08-24 DIAGNOSIS — J45.50 SEVERE PERSISTENT ASTHMA WITHOUT COMPLICATION (H): Primary | ICD-10-CM

## 2023-08-24 LAB
BASOPHILS # BLD AUTO: 0 10E3/UL (ref 0–0.2)
BASOPHILS NFR BLD AUTO: 1 %
EOSINOPHIL # BLD AUTO: 0.6 10E3/UL (ref 0–0.7)
EOSINOPHIL NFR BLD AUTO: 9 %
ERYTHROCYTE [DISTWIDTH] IN BLOOD BY AUTOMATED COUNT: 15.1 % (ref 10–15)
HCT VFR BLD AUTO: 35.9 % (ref 35–47)
HGB BLD-MCNC: 11.3 G/DL (ref 11.7–15.7)
IMM GRANULOCYTES # BLD: 0 10E3/UL
IMM GRANULOCYTES NFR BLD: 0 %
LYMPHOCYTES # BLD AUTO: 1.7 10E3/UL (ref 0.8–5.3)
LYMPHOCYTES NFR BLD AUTO: 27 %
MCH RBC QN AUTO: 25.9 PG (ref 26.5–33)
MCHC RBC AUTO-ENTMCNC: 31.5 G/DL (ref 31.5–36.5)
MCV RBC AUTO: 82 FL (ref 78–100)
MONOCYTES # BLD AUTO: 0.4 10E3/UL (ref 0–1.3)
MONOCYTES NFR BLD AUTO: 7 %
NEUTROPHILS # BLD AUTO: 3.6 10E3/UL (ref 1.6–8.3)
NEUTROPHILS NFR BLD AUTO: 57 %
PLATELET # BLD AUTO: 171 10E3/UL (ref 150–450)
RBC # BLD AUTO: 4.36 10E6/UL (ref 3.8–5.2)
WBC # BLD AUTO: 6.3 10E3/UL (ref 4–11)

## 2023-08-24 PROCEDURE — 36415 COLL VENOUS BLD VENIPUNCTURE: CPT

## 2023-08-24 PROCEDURE — 86036 ANCA SCREEN EACH ANTIBODY: CPT

## 2023-08-24 PROCEDURE — 99214 OFFICE O/P EST MOD 30 MIN: CPT | Performed by: ALLERGY & IMMUNOLOGY

## 2023-08-24 PROCEDURE — 86037 ANCA TITER EACH ANTIBODY: CPT

## 2023-08-24 PROCEDURE — 86003 ALLG SPEC IGE CRUDE XTRC EA: CPT

## 2023-08-24 PROCEDURE — 85025 COMPLETE CBC W/AUTO DIFF WBC: CPT

## 2023-08-24 RX ORDER — DUPILUMAB 300 MG/2ML
300 INJECTION, SOLUTION SUBCUTANEOUS
Qty: 4 ML | Refills: 5 | Status: SHIPPED | OUTPATIENT
Start: 2023-08-24 | End: 2024-01-22

## 2023-08-24 RX ORDER — PREDNISONE 20 MG/1
TABLET ORAL
Qty: 100 TABLET | Refills: 0 | Status: SHIPPED | OUTPATIENT
Start: 2023-08-24 | End: 2023-10-25

## 2023-08-24 ASSESSMENT — ASTHMA QUESTIONNAIRES
ACT_TOTALSCORE: 9
QUESTION_2 LAST FOUR WEEKS HOW OFTEN HAVE YOU HAD SHORTNESS OF BREATH: MORE THAN ONCE A DAY
QUESTION_4 LAST FOUR WEEKS HOW OFTEN HAVE YOU USED YOUR RESCUE INHALER OR NEBULIZER MEDICATION (SUCH AS ALBUTEROL): ONE OR TWO TIMES PER DAY
QUESTION_1 LAST FOUR WEEKS HOW MUCH OF THE TIME DID YOUR ASTHMA KEEP YOU FROM GETTING AS MUCH DONE AT WORK, SCHOOL OR AT HOME: MOST OF THE TIME
QUESTION_3 LAST FOUR WEEKS HOW OFTEN DID YOUR ASTHMA SYMPTOMS (WHEEZING, COUGHING, SHORTNESS OF BREATH, CHEST TIGHTNESS OR PAIN) WAKE YOU UP AT NIGHT OR EARLIER THAN USUAL IN THE MORNING: TWO OR THREE NIGHTS A WEEK
ACT_TOTALSCORE: 9
QUESTION_5 LAST FOUR WEEKS HOW WOULD YOU RATE YOUR ASTHMA CONTROL: POORLY CONTROLLED

## 2023-08-24 NOTE — PATIENT INSTRUCTIONS
Dupixent for now    Tezspire?    Prednison 20 mg twice daily for 5 days, then 20 mg daily until you hear from me    ?CT sinus

## 2023-08-24 NOTE — PROGRESS NOTES
Subjective   Kulwant is a 53 year old, presenting for the following health issues:  Asthma Recheck    HPI     Chief complaint: Follow-up asthma     history of present illness: This is a 53-year-old woman with a history of asthma, eosinophilia, COPD, reflux latent TB here today to follow-up asthma.  She currently using Dupixent 300 mg every 2 weeks.  Patient's daughter-in-law is with her today and states that she continues to cough.  In the visit today she is coughing frequently.  She continues to use her Spiriva and Breo.  She demonstrates good technique.  She states she has been compliant with Dupixent but despite this continues to have significant cough.  Her daughter reports that it worsened over the last month.  She has been on prednisone and recently completed the course and she thinks she needs some more prednisone.  She wakes up 5-6 times per night.  She was with albuterol neb solution which helped minimally.  In review of her work-up, she has had reflux evaluation and is currently on omeprazole as well as Carafate.  She also has had evaluation at the [facility] clinic.  Recent CT showed lower lung fields suggesting air trapping.  Previous allergy testing was mostly negative.  She does continue on montelukast.  She states she had a recent breathing test but I do not see this in the record.  ANCA previously was negative.  Eosinophils have been as high as 1400 recently.  Strongyloides test and other eosinophilic work-up was negative.  She feels that the cough is coming from her lungs and not her throat.  She denies much nasal congestion or drainage.  She is not sure if she ever has had her sinuses evaluated.              Objective    Pulse 101   Wt 56.2 kg (124 lb)   SpO2 99%   BMI 20.01 kg/m    Body mass index is 20.01 kg/m .  Physical Exam   Gen: Pleasant female not in acute distress  HEENT: Eyes no erythema of the bulbar or palpebral conjunctiva, no edema.  Nose: No congestion, mucosa normal. Mouth:  Throat clear, no lip or tongue edema.   Cardiac: Regular rate and rhythm, no murmurs, rubs or gallops  Respiratory: Expiratory wheezing throughout the posterior lung field      Psych: Alert and oriented times 3    Impression report and plan:  1.  Severe persistent asthma with eosinophilia  2.  Cough    I am not sure that she is responding to Dupixent.  Patient would like to continue it for now but I would like to talk with her pulmonologist regarding next step.  I think we could consider Tezspire.  Beyond this, I am not sure how many more options to offer her to help with her cough.  She has been evaluated for reflux, lungs voice clinic, and allergy and continues to cough.  Continue Dupixent for now and follow in 6 months but I will contact the daughter-in-law soon regarding neck step recommendations once I am able to discuss with her pulmonologist.Prednisone script given.  Okay to stay on prednisone 20 mg once she completes the prednisone burst.  Did discuss systemic side effects of osteoporosis heart disease, diabetes and cataracts.  She has been on Fasenra previously but this caused increased itching and she does not want to try this medication again.        Time spent with patient, chart review and documentation, 30 minutes on date of service.

## 2023-08-24 NOTE — LETTER
8/24/2023         RE: Kulwant Amin  [address] [facility] [address]        Dear Colleague,    Thank you for referring your patient, Kulwant Amin, to the [facility]. Please see a copy of my visit note below.          Subjective  Kulwant is a 53 year old, presenting for the following health issues:  Asthma Recheck    HPI     Chief complaint: Follow-up asthma     history of present illness: This is a 53-year-old woman with a history of asthma, eosinophilia, COPD, reflux latent TB here today to follow-up asthma.  She currently using Dupixent 300 mg every 2 weeks.  Patient's daughter-in-law is with her today and states that she continues to cough.  In the visit today she is coughing frequently.  She continues to use her Spiriva and Breo.  She demonstrates good technique.  She states she has been compliant with Dupixent but despite this continues to have significant cough.  Her daughter reports that it worsened over the last month.  She has been on prednisone and recently completed the course and she thinks she needs some more prednisone.  She wakes up 5-6 times per night.  She was with albuterol neb solution which helped minimally.  In review of her work-up, she has had reflux evaluation and is currently on omeprazole as well as Carafate.  She also has had evaluation at the [facility] clinic.  Recent CT showed lower lung fields suggesting air trapping.  Previous allergy testing was mostly negative.  She does continue on montelukast.  She states she had a recent breathing test but I do not see this in the record.  ANCA previously was negative.  Eosinophils have been as high as 1400 recently.  Strongyloides test and other eosinophilic work-up was negative.  She feels that the cough is coming from her lungs and not her throat.  She denies much nasal congestion or drainage.  She is not sure if she ever has had her sinuses evaluated.              Objective   Pulse 101   Wt 56.2 kg (124  lb)   SpO2 99%   BMI 20.01 kg/m    Body mass index is 20.01 kg/m .  Physical Exam   Gen: Pleasant female not in acute distress  HEENT: Eyes no erythema of the bulbar or palpebral conjunctiva, no edema.  Nose: No congestion, mucosa normal. Mouth: Throat clear, no lip or tongue edema.   Cardiac: Regular rate and rhythm, no murmurs, rubs or gallops  Respiratory: Expiratory wheezing throughout the posterior lung field      Psych: Alert and oriented times 3    Impression report and plan:  1.  Severe persistent asthma with eosinophilia  2.  Cough    I am not sure that she is responding to Dupixent.  Patient would like to continue it for now but I would like to talk with her pulmonologist regarding next step.  I think we could consider Tezspire.  Beyond this, I am not sure how many more options to offer her to help with her cough.  She has been evaluated for reflux, lungs voice clinic, and allergy and continues to cough.  Continue Dupixent for now and follow in 6 months but I will contact the daughter-in-law soon regarding neck step recommendations once I am able to discuss with her pulmonologist.Prednisone script given.  Okay to stay on prednisone 20 mg once she completes the prednisone burst.  Did discuss systemic side effects of osteoporosis heart disease, diabetes and cataracts.  She has been on Fasenra previously but this caused increased itching and she does not want to try this medication again.        Time spent with patient, chart review and documentation, 30 minutes on date of service.        Again, thank you for allowing me to participate in the care of your patient.        Sincerely,        Elizabeth REYNOSO MD

## 2023-08-24 NOTE — Clinical Note
Hello-  I saw this patient today. Cough is very intense and she continues to wheeze. Family states she has been compliant with Dupixent. Dupixent should be working better than this given her degree of eosinophilia.  I think all I have left for her is Tezspire.  Thoughts?  Any other work-up we are missing?  I am not sure where else to take this, but she was very uncomfortable today.  I gave her another  course of prednisone and asked her to stay on 20 mg until I am able to discuss with you  Elizabeth

## 2023-08-25 LAB
A ALTERNATA IGE QN: <0.1 KU(A)/L
A FUMIGATUS IGE QN: <0.1 KU(A)/L
ANCA AB PATTERN SER IF-IMP: NORMAL
C HERBARUM IGE QN: <0.1 KU(A)/L
C-ANCA TITR SER IF: NORMAL {TITER}
CALIF WALNUT POLN IGE QN: <0.1 KU(A)/L
CAT DANDER IGG QN: <0.1 KU(A)/L
CEDAR IGE QN: <0.1 KU(A)/L
COCKSFOOT IGE QN: <0.1 KU(A)/L
COMMON RAGWEED IGE QN: <0.1 KU(A)/L
COTTONWOOD IGE QN: <0.1 KU(A)/L
D FARINAE IGE QN: <0.1 KU(A)/L
D PTERONYSS IGE QN: <0.1 KU(A)/L
DOG DANDER+EPITH IGE QN: <0.1 KU(A)/L
E PURPURASCENS IGE QN: <0.1 KU(A)/L
EAST WHITE PINE IGE QN: <0.1 KU(A)/L
ENGL PLANTAIN IGE QN: <0.1 KU(A)/L
FIREBUSH IGE QN: <0.1 KU(A)/L
GIANT RAGWEED IGE QN: <0.1 KU(A)/L
GOOSEFOOT IGE QN: <0.1 KU(A)/L
JOHNSON GRASS IGE QN: <0.1 KU(A)/L
MAPLE IGE QN: <0.1 KU(A)/L
MUGWORT IGE QN: <0.1 KU(A)/L
NETTLE IGE QN: <0.1 KU(A)/L
P NOTATUM IGE QN: <0.1 KU(A)/L
RED MULBERRY IGE QN: <0.1 KU(A)/L
SALTWORT IGE QN: <0.1 KU(A)/L
SHEEP SORREL IGE QN: <0.1 KU(A)/L
SILVER BIRCH IGE QN: <0.1 KU(A)/L
TIMOTHY IGE QN: <0.1 KU(A)/L
WHITE ASH IGE QN: <0.1 KU(A)/L
WHITE ELM IGE QN: <0.1 KU(A)/L
WHITE MULBERRY IGE QN: <0.1 KU(A)/L
WHITE OAK IGE QN: <0.1 KU(A)/L
WORMWOOD IGE QN: <0.1 KU(A)/L

## 2023-08-28 DIAGNOSIS — J45.50 SEVERE PERSISTENT ASTHMA WITHOUT COMPLICATION (H): Primary | ICD-10-CM

## 2023-08-29 ENCOUNTER — TELEPHONE (OUTPATIENT)
Dept: ALLERGY | Facility: CLINIC | Age: 53
End: 2023-08-29
Payer: COMMERCIAL

## 2023-08-29 NOTE — TELEPHONE ENCOUNTER
----- Message from Chelita Palma RN sent at 8/28/2023  9:40 AM CDT -----  Sent email to CAM team, will wait for response. Have not called pt yet  ----- Message -----  From: Elizabeth Marie MD  Sent: 8/28/2023   8:59 AM CDT  To: Allergy Care Support Pool    Sent for Tezspire, can we escalate and then let patient know that I talked with Dr. Duong and she would like her to try Tezspire instead of Dupixent.  Continue Dupixent for now, but try to stop the prednisone. Will let her know once approved (once monthly in office)

## 2023-09-12 DIAGNOSIS — J44.9 CHRONIC OBSTRUCTIVE PULMONARY DISEASE, UNSPECIFIED COPD TYPE (H): ICD-10-CM

## 2023-09-12 RX ORDER — ALBUTEROL SULFATE 0.83 MG/ML
2.5 SOLUTION RESPIRATORY (INHALATION) EVERY 6 HOURS PRN
Qty: 90 ML | Refills: 1 | Status: SHIPPED | OUTPATIENT
Start: 2023-09-12 | End: 2023-10-05

## 2023-09-12 NOTE — PROGRESS NOTES
ASSESMENT AND PLAN:  Diagnoses and all orders for this visit:    Hospital discharge follow-up  Overall improving but still coughing.  No med changes today.  Continue oral antibiotic and oral steroid until done.  She has home health nurse to help with her pillbox.    Pneumonia due to infectious organism, unspecified laterality, unspecified part of lung  -     XR Chest 2 Views  Last chest x-ray was on 11/22/2018.  Still complaining of cough.  Will get repeat x-ray today.    Lactic acidosis  -     Lactic Acid  Metformin was discontinued.  Continue glyburide.    Type 2 diabetes mellitus treated without insulin (H)   -     Glycosylated Hemoglobin A1c  Continue glyburide.    Insomnia  Will try unisom    She has follow-up appointment scheduled with me  in 1/19.      SUBJECTIVE: Kulwant Amin is a 48-year-old female with history of diabetes, asthma, hypertension, hyperlipidemia, CAD status post stent placement here for hospital follow-up.  She was admitted on 11/22/2018 and was discharged on 11/26/2018.  She went into the ER with shortness of breath and worsening cough failed to respond to outpatient oral prednisone.  Was diagnosed with pneumonia.  Was found to have elevated lactic acid.  She was on metformin for diabetes, metformin was discontinued and started on glipizide.  She was discharged with oral cefdinir for 10 days oral prednisone 20 mg for 7 days.  Daughter-in-law is helping her check her blood sugar daily.  The numbers are usually 120s-140, she had one reading of 425 a week ago.  She states shortness of breath is improving but still coughing.  She denied fevers since hospital discharge.  She denied acute chest pain or palpitations.  She is requesting medication to help her sleep better at night.  Denied worsening depression symptoms.    Past Medical History:   Diagnosis Date     Anxiety      Arthritis      Asthma      Back pain      COPD (chronic obstructive pulmonary disease) (H)      Depression      Diabetes  "mellitus (H)      Generalized headaches      HTN (hypertension)      Pneumonia      SVT (supraventricular tachycardia) (H)      Patient Active Problem List   Diagnosis     HTN (hypertension)     Simple chronic bronchitis (H)     Pain     GERD (gastroesophageal reflux disease)     SVT (supraventricular tachycardia) (H)     Chest wall pain     Asthma without status asthmaticus     CAD (coronary artery disease)     Acute blood loss anemia     Abdominal pain     Acute midline low back pain without sciatica     Type 2 diabetes mellitus treated without insulin (H)     S/P coronary artery stent placement     Moderate persistent asthma     Migraine headache     Cataract     Epigastric pain     Unstable angina (H)     Hyperlipidemia     Shortness of breath     Lactic acid acidosis     Dyspnea       Allergies:  No Known Allergies    Social History     Tobacco Use   Smoking Status Former Smoker     Last attempt to quit: 2003     Years since quitting: 15.9   Smokeless Tobacco Never Used       Review of systems otherwise negative except as listed in HPI.   Social History     Tobacco Use   Smoking Status Former Smoker     Last attempt to quit: 2003     Years since quitting: 15.9   Smokeless Tobacco Never Used       OBJECTICE: BP 96/60 (Patient Site: Right Arm, Patient Position: Sitting, Cuff Size: Adult Regular)   Pulse 83   Temp 98.3  F (36.8  C) (Oral)   Ht 5' 3\" (1.6 m)   Wt 129 lb 6 oz (58.7 kg)   SpO2 98%   BMI 22.92 kg/m      DATA REVIEWED:  Additional History from Old Records Summarized (2):   Radiology Tests Summarized or Ordered (1):   Labs Reviewed or Ordered (1):       GEN-alert,  in no apparent distress.  HEENT-mucous membranes are moist, neck is supple.  CV-regular rate and rhythm with no murmur.   RESP-no wheezing, crackles or rhonchi today.  ABDOMEN- Soft , not tender.  EXTREM- No edema.  SKIN-normal    This transcription uses voice recognition software, which may contain typographical errors.        Adrien Cardoza"   11/28/2018    Render Risk Assessment In Note?: no Additional Notes: In setting of recent pregnancy and miscarriage; discussed 3-6 months for improvement Detail Level: Zone

## 2023-09-13 ENCOUNTER — OFFICE VISIT (OUTPATIENT)
Dept: PHARMACY | Facility: CLINIC | Age: 53
End: 2023-09-13
Payer: COMMERCIAL

## 2023-09-13 VITALS
BODY MASS INDEX: 19.81 KG/M2 | WEIGHT: 122.75 LBS | HEART RATE: 77 BPM | OXYGEN SATURATION: 100 % | SYSTOLIC BLOOD PRESSURE: 122 MMHG | DIASTOLIC BLOOD PRESSURE: 82 MMHG

## 2023-09-13 DIAGNOSIS — J45.50 SEVERE PERSISTENT ASTHMA, UNSPECIFIED WHETHER COMPLICATED (H): ICD-10-CM

## 2023-09-13 DIAGNOSIS — K59.00 CONSTIPATION, UNSPECIFIED CONSTIPATION TYPE: ICD-10-CM

## 2023-09-13 DIAGNOSIS — E11.9 TYPE 2 DIABETES MELLITUS TREATED WITH INSULIN (H): Primary | ICD-10-CM

## 2023-09-13 DIAGNOSIS — F33.9 RECURRENT MAJOR DEPRESSIVE DISORDER, REMISSION STATUS UNSPECIFIED (H): Chronic | ICD-10-CM

## 2023-09-13 DIAGNOSIS — I31.9 PERICARDITIS, UNSPECIFIED CHRONICITY, UNSPECIFIED TYPE: ICD-10-CM

## 2023-09-13 DIAGNOSIS — R52 PAIN: ICD-10-CM

## 2023-09-13 DIAGNOSIS — R51.9 CHRONIC NONINTRACTABLE HEADACHE, UNSPECIFIED HEADACHE TYPE: Chronic | ICD-10-CM

## 2023-09-13 DIAGNOSIS — Z91.09 ENVIRONMENTAL ALLERGIES: ICD-10-CM

## 2023-09-13 DIAGNOSIS — E78.5 HYPERLIPIDEMIA, UNSPECIFIED HYPERLIPIDEMIA TYPE: Chronic | ICD-10-CM

## 2023-09-13 DIAGNOSIS — G89.29 CHRONIC NONINTRACTABLE HEADACHE, UNSPECIFIED HEADACHE TYPE: Chronic | ICD-10-CM

## 2023-09-13 DIAGNOSIS — Z79.4 TYPE 2 DIABETES MELLITUS TREATED WITH INSULIN (H): Primary | ICD-10-CM

## 2023-09-13 DIAGNOSIS — K21.9 GASTROESOPHAGEAL REFLUX DISEASE WITHOUT ESOPHAGITIS: ICD-10-CM

## 2023-09-13 DIAGNOSIS — I10 ESSENTIAL HYPERTENSION: ICD-10-CM

## 2023-09-13 PROCEDURE — 99607 MTMS BY PHARM ADDL 15 MIN: CPT | Performed by: PHARMACIST

## 2023-09-13 PROCEDURE — 99606 MTMS BY PHARM EST 15 MIN: CPT | Performed by: PHARMACIST

## 2023-09-13 RX ORDER — METFORMIN HCL 500 MG
500 TABLET, EXTENDED RELEASE 24 HR ORAL 2 TIMES DAILY WITH MEALS
Qty: 180 TABLET | Refills: 3 | Status: SHIPPED | OUTPATIENT
Start: 2023-09-13 | End: 2024-01-22

## 2023-09-13 NOTE — PROGRESS NOTES
Medication Therapy Management (MTM) Encounter    ASSESSMENT:                            Medication Adherence/Access: No issues identified    1. Type 2 diabetes mellitus treated with insulin (H)  A1c elevated, not meeting goal <7%.  Per CGM data, blood sugars have remained elevated, especially in the evenings.  Therefore, recommended patient use her NovoLog twice daily before meals rather than just once daily in the morning.  Patient reports persistent symptoms of nausea and vomiting, potentially related to metformin use, therefore will switch to extended release and decrease dose today.  Of note, patient's current blood sugars likely elevated due to oral steroid use.    2. Essential hypertension  BP meeting goal of <130/80 mmHg.  Recommend patient check at home to verify that she is still taking hydrochlorothiazide daily as prescribed.  For now, continue current medications without change.    3. Hyperlipidemia, unspecified hyperlipidemia type  Patient appropriately taking high intensity statin and LDL at goal.  Of note, patient mentions symptoms of bilateral leg pain which has been ongoing though patient not interested in adjusting medication today, could consider assessing for statin-induced myopathy in the future, will defer to PCP to further assess.    4. Pericarditis, unspecified chronicity, unspecified type  Managed by patient's cardiologist, continues to take colchicine daily as prescribed.    5. Severe persistent asthma, unspecified whether complicated  Managed by patient's pulmonologist and allergist. Continue current medications as directed.  Of note, would recommend avoiding chronic use of oral steroids due to worsening diabetes control.    6. Pain  Stable.    7. Recurrent major depressive disorder, remission status unspecified (H)  Patient may benefit from dose increase of fluoxetine, though patient declines recommendation today.    8. Chronic nonintractable headache, unspecified headache type  Managed by  patient's neurologist with amitriptyline and topiramate, recommended patient use topiramate consistently or if having concerns about taking the medication to further discuss with neurology.  Unclear why patient not also prescribed abortive therapy such as triptan.    9. Gastroesophageal reflux disease without esophagitis  Stable.    10. Constipation, unspecified constipation type  Stable on current therapy.    PLAN:                            Use Novolog insulin 10 units twice daily before meals (may require higher doses while patient taking oral steroid)  STOP metformin 1000 mg twice daily; START metformin  mg twice daily   Patient to check at home to verify she is taking hydrochlorothiazide daily    Medication issues to be addressed at a future visit:   Urine albumin in November    Follow-up: Return in about 8 weeks (around 11/7/2023) for With PharmD.  Cardiology 9/14, PCP 10/5    SUBJECTIVE/OBJECTIVE:                          Kulwant Amin is a 53 year old female coming in for a follow-up visit from 7/10/23. Patient was accompanied by daughter in law. Patient seen with [facility] . ID# [id number]    Reason for visit: MTM follow up.    Allergies/ADRs: Reviewed in chart  Past Medical History: Reviewed in chart  Tobacco: She reports that she quit smoking about 20 years ago. Her smoking use included cigarettes. She has a 30.00 pack-year smoking history. She has never been exposed to tobacco smoke. She has never used smokeless tobacco.  Alcohol: none    Medication Adherence/Access: Patient has a home health nurse that sets up a three times daily pillbox. Patient brings with her medication vials today but does NOT bring her pillbox.   Does present with extra vials of medications today though states that this is due to recent hospitalizations that cause the extra supply.     Diabetes   Type 2 Diabetes:    Metformin 1000 mg twice daily with food  Lantus 40 units once daily at night  Novolog 10 units once daily  after AM meal (prescribed twice daily before meals). Daughter in law helps patient with insulin administration.  Aspirin 81mg daily  Patient is experiencing the following side effects: vomiting and nausea, this has been ongoing for a long time. Vomits during visit today.   Blood sugar monitoring: Freestyle Gemma CGM   Current diabetes symptoms: none  Diet/Exercise: Reports eating 2 meals per day, usually eats brown rice, beans, lentils, veggies     Eye exam is up to date  Foot exam is up to date  Urine Albumin:   Lab Results   Component Value Date    UMALCR  11/30/2022      Comment:      Unable to calculate, urine albumin and/or urine creatinine is outside detectable limits.  Microalbuminuria is defined as an albumin:creatinine ratio of 17 to 299 for males and 25 to 299 for females. A ratio of albumin:creatinine of 300 or higher is indicative of overt proteinuria.  Due to biologic variability, positive results should be confirmed by a second, first-morning random or 24-hour timed urine specimen. If there is discrepancy, a third specimen is recommended. When 2 out of 3 results are in the microalbuminuria range, this is evidence for incipient nephropathy and warrants increased efforts at glucose control, blood pressure control, and institution of therapy with an angiotensin-converting-enzyme (ACE) inhibitor (if the patient can tolerate it).        Lab Results   Component Value Date    A1C 7.9 (H) 07/10/2023         Hypertension   Hydrochlorothiazide 12.5 mg daily (brings with empty vial today but states that she is still taking)  Metoprolol tartrate 25 mg twice daily   Patient reports the following medication side effects: dizziness all the time, hence taking meclizine three times daily.  Patient self-monitors blood pressure.  Home BP monitoring not mentioned today.       BP Readings from Last 3 Encounters:   09/13/23 122/82   08/10/23 133/84   08/08/23 113/74     Pulse Readings from Last 3 Encounters:   09/13/23 77    08/24/23 101   08/10/23 92     Hyperlipidemia   Atorvastatin 80 mg daily  Patient reports muscle pains bilaterally in her legs, including the knees. States that this has been present for a long time and her doctors are already aware of this.   The ASCVD Risk score (Doris CANDELARIO, et al., 2019) failed to calculate for the following reasons:    The patient has a prior MI or stroke diagnosis     Recent Labs   Lab Test 03/14/23  1140 02/16/23  1137 03/25/22  1200   CHOL 125  --  145   HDL 41*  --  52   LDL 41 45 54   TRIG 214*  --  197*     Pericarditis    Colchicine 0.6 mg daily (started 7/24)  Reports improvement of chest pain. Visit with cardiologist tomorrow.      Allergies/asthma/hypereosinophilia:   Loratadine 10mg daily  Montelukast 10 mg daily at bedtime  Breo Ellipta 200/25mcg 1 puff daily  Spiriva 2.5 mcg 2 puffs daily  Albuterol HFA daily as needed   Albuterol nebs every 6 hours as needed  Tezspire 210 mg every 28 days/Dupixent (not yet approved)  Dextromethorphan 10 mL twice daily as needed for cough all  Prednisone 20 mg daily (recent taper started in August per Dr. Marie)  Pulmonologist: [facility] [address] [facility], Tamra Mohr.   Allergy & immunology: [facility], Dr. Elizabeth Marie.     Pain  Gabapentin 600 mg three times daily   Cyclobenzaprine 5 mg TID   Diclofenac 1% gel 4g 4 times daily     Mood  Fluoxetine 10 mg daily  Reports that sometimes her mood is up and sometimes down. Not interested in dose increase today.       3/14/2023    11:06 AM 6/29/2023    10:19 AM 8/8/2023     9:21 AM   PHQ   PHQ-9 Total Score 11 10 16   Q9: Thoughts of better off dead/self-harm past 2 weeks Not at all Not at all Not at all     Migraine/headache  Amitriptyline 50 mg daily  Topiramate 25 mg tablet (does not bring this with today but states that she is taking this)   Acetaminophen as needed for headaches, does not report ow often.  Notes that she is concerned about the potential of topiramate and risk of kidney stones.     Seeing neurologist, Dr. Prescott.     GERD  Omeprazole 40 mg daily in the morning  Sucralfate 1g 3 times daily (does not bring with today but still taking)  Tums 1 tablet twice daily PRN   Reports well controlled symptoms with current regimen.     Constipation  Docusate 100 mg twice daily (taking front he bottle when needed)  Miralax 17 g daily (reports using 1-2 times daily)  Reports that she is still having issues with her bowels. Last BM was yesterday. Then reports that this is ok, does not want to change medications today.     Today's Vitals: /82   Pulse 77   Wt 122 lb 12 oz (55.7 kg)   SpO2 100%   BMI 19.81 kg/m    ----------------    I spent 60 minutes with this patient today. All changes were made via collaborative practice agreement with Adrien Cardoza MD. A copy of the visit note was provided to the patient's provider(s).    A summary of these recommendations was given to the patient.    Elen Crawley, Pharm.D.  Medication Therapy Management Resident     Malu Gil, PharmD, [facility]  Medication Therapy Management Pharmacist     Medication Therapy Recommendations  Type 2 diabetes mellitus treated with insulin (H)    Current Medication: insulin aspart (NOVOLOG PEN) 100 UNIT/ML pen   Rationale: Frequency inappropriate - Dosage too low - Effectiveness   Recommendation: Increase Frequency   Status: Accepted per CPA          Current Medication: metFORMIN (GLUCOPHAGE) 1000 MG tablet (Discontinued)   Rationale: Undesirable effect - Adverse medication event - Safety   Recommendation: Change Medication Formulation    Status: Accepted per CPA

## 2023-09-13 NOTE — PATIENT INSTRUCTIONS
"Recommendations from today's MTM visit:                                                      Use Novolog insulin 10 units twice daily before meals  STOP metformin 1000 mg twice daily; START metformin  mg twice daily     Follow-up: Return in about 8 weeks (around 11/7/2023) for With PharmD.    It was great speaking with you today.  I value your experience and would be very thankful for your time in providing feedback in our clinic survey. In the next few days, you may receive an email or text message from [employer] with a link to a survey related to your  clinical pharmacist.\"     To schedule another MTM appointment, please call the clinic directly or you may call the MTM scheduling line at [phone] or toll-free at [phone].     My Clinical Pharmacist's contact information:                                                      Please feel free to contact me with any questions or concerns you have.      Malu Gil, PharmD, BCACP  Medication Therapy Management Pharmacist  "

## 2023-09-13 NOTE — Clinical Note
OF note, patient having nausea/vomiting for a long time. I am trying to switch her metformin today and see if that will help. She was requesting an antinausea medication, would you be able to send something for her if you think that's appropriate?  Additionally, patient describes bilateral leg pain - this could potentially be related to her statin use, I wonder if we should consider trying a lower dose or holding for a bit to see if statin association? Not sure if you have already discussed this with her in the past?   Thank you Jaylon

## 2023-09-14 ENCOUNTER — OFFICE VISIT (OUTPATIENT)
Dept: CARDIOLOGY | Facility: CLINIC | Age: 53
End: 2023-09-14
Attending: INTERNAL MEDICINE
Payer: COMMERCIAL

## 2023-09-14 VITALS
HEART RATE: 93 BPM | HEIGHT: 66 IN | OXYGEN SATURATION: 97 % | BODY MASS INDEX: 19.61 KG/M2 | SYSTOLIC BLOOD PRESSURE: 120 MMHG | WEIGHT: 122 LBS | RESPIRATION RATE: 16 BRPM | DIASTOLIC BLOOD PRESSURE: 70 MMHG

## 2023-09-14 DIAGNOSIS — E78.5 HYPERLIPIDEMIA WITH TARGET LDL LESS THAN 70: ICD-10-CM

## 2023-09-14 DIAGNOSIS — J44.9 CHRONIC OBSTRUCTIVE PULMONARY DISEASE, UNSPECIFIED COPD TYPE (H): ICD-10-CM

## 2023-09-14 DIAGNOSIS — I25.10 CORONARY ARTERY DISEASE INVOLVING NATIVE CORONARY ARTERY OF NATIVE HEART WITHOUT ANGINA PECTORIS: ICD-10-CM

## 2023-09-14 DIAGNOSIS — R07.9 CHEST PAIN, UNSPECIFIED TYPE: ICD-10-CM

## 2023-09-14 DIAGNOSIS — I30.0 ACUTE IDIOPATHIC PERICARDITIS: Primary | ICD-10-CM

## 2023-09-14 LAB — CRP SERPL-MCNC: 3.2 MG/L

## 2023-09-14 PROCEDURE — 99215 OFFICE O/P EST HI 40 MIN: CPT | Performed by: GENERAL ACUTE CARE HOSPITAL

## 2023-09-14 PROCEDURE — 86140 C-REACTIVE PROTEIN: CPT | Performed by: GENERAL ACUTE CARE HOSPITAL

## 2023-09-14 PROCEDURE — 36415 COLL VENOUS BLD VENIPUNCTURE: CPT | Performed by: GENERAL ACUTE CARE HOSPITAL

## 2023-09-14 RX ORDER — COLCHICINE 0.6 MG/1
0.6 TABLET ORAL DAILY
Qty: 90 TABLET | Refills: 3 | Status: SHIPPED | OUTPATIENT
Start: 2023-09-14 | End: 2023-11-08

## 2023-09-14 RX ORDER — LORATADINE 10 MG/1
TABLET ORAL
Qty: 30 TABLET | Refills: 3 | Status: SHIPPED | OUTPATIENT
Start: 2023-09-14 | End: 2023-10-25

## 2023-09-14 NOTE — LETTER
9/14/2023    Adrien Cardoza MD  [address] [place] [address] 1  [place] [address]    RE: Kulwant Amin       Dear Colleague,     I had the pleasure of seeing Kulwant Amin in the [facility].  HEART CARE ENCOUNTER NOTE        Assessment/Recommendations   Assessment:    Chest pain attributed to acute pericarditis. She still has symptoms although she reports feeling improvement.  Coronary artery disease status post drug-eluting stenting x1 to the [id number] left anterior descending artery 1/25/2018. Her last coronary angiogram 5/5/2022 showed a patent stent and no new obstructive lesions. Pharmacologic nuclear stress testing 4/17/2023 showed no clear evidence of ischemia. Her current chest pain does not seem anginal.  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  Supraventricular tachycardia. This has been noted on outside telemetry strips but does not appear to have ever been captured on any resting 12-lead electrocardiogram or ambulatory cardiac monitor in our system. She is not having symptoms of this currently.  Hyperlipidemia. Last LDL 41 mg/dL.  Severe obstructive lung disease.  Mild obstructive sleep apnea noted on polysomnography 4/1/2022.    Plan:  Continue colchicine 0.6 mg daily for at least 3 months, with consideration of indefinite therapy if she tolerates this given the benefit of colchicine for secondary prevention of coronary artery disease.  Continue other medications.  Follow-up with Dr. Hirsch in 3 months       A total time of 60 minutes was spent on the date of this encounter.    History of Present Illness   Ms. Kulwant Amin is a 53 year old female with a significant past history of CAD s/p VINICIO x1 to the proximal-mid LAD 1/25/2018, right pontine CVA 1/13/2021, severe obstructive lung disease, SVT, mild FLACO and HLD presenting for urgent hospital follow-up. She normally follows in cardiology clinic with my colleague, Dr. Levi Hirsch.     She was hospitalized with chest pain  7/24/2023 - 7/26/2023 with chest pain diagnosed as acute pericarditis. Troponins were undetectable and BNP was undetectable. ECG showed subtle ST elevations. CRP was elevated at 11. She was started on colchicine 0.6 mg twice daily for a month then had the dose reduced to 0.6 mg just once daily.    She feels the colchicine has helped. She still feels chest pain, which is a constant tightness in her chest. Nothing seems to make it worse. She has baseline severe dyspnea which is unchanged. No recent palpitations. No light headedness/dizziness, pre-syncope, syncope, lower extremity swelling, paroxysmal nocturnal dyspnea (PND), or orthopnea.     Cardiac Problems and Cardiac Diagnostics     Most Recent Cardiac testing:  ECG dated 7/24/2023 (personaly reviewed and interpreted): SR, overall normal ECG    Pulmonary function testing 4/17/2017 (report reviewed):  FEV1/FVC is 80% and is normal.  FEV1 is 1.05L (39%) predicted and is reduced.  FVC is 1.32L (40%) predicted and reduced.  There was no improvement in spirometry after a single inhaled dose of bronchodilator.  Flow volume loops indicate inconsistent loops.     Impression:  Full Pulmonary Function Test is abnormal.  PFTs are consistent with severe obstructive disease.  Spirometry is not consistent with reversibility.  The results of this test are questionable due to patient's inability to perform the maneuvers according to the ATS standards.  Testing was done with a [facility] .     Sleep study 4/1/2022 (report reviewed):  Assessment:   Mild obstructive sleep apnea     Recommendations:  Based on the presence of mild obstructive sleep apnea and symptoms or comorbidities, treatment could be empirically initiated with Auto?titrating PAP therapy with a range of 5 to 15 cmH2O. Recommend clinical follow up with sleep management team.  Patient may be a candidate for dental appliance through referral to Sleep Dentistry for the treatment of obstructive sleep apnea and/or  socially disruptive snoring.  If devices are not acceptable or effective, patient may benefit from evaluation of possible surgical options. If she is interested, would recommend referral to specialized ENT-Sleep provider.  Advice regarding the risks of drowsy driving.  Suggest optimizing sleep schedule and avoiding sleep deprivation.  Address sleep hygiene  Pharmacologic therapy should be used for management of restless legs syndrome only if present and clinically indicated and not based on the presence of periodic limb movements alone.    Event monitor 10/19/2022 (report reviewed):  Results:  Indication for study: Supraventricular tachycardia  Time monitored: 11 days 23 hours.    The baseline rhythm transmission demonstrated normal sinus rhythm with heart rates in the 90s. The ME interval, QRS duration, and QT interval, all appear to be normal.  The patient had 2 manually activated rhythm recordings.  Symptoms were reported as heart racing.  The patient's rhythm demonstrated normal sinus rhythm with heart rates ranging between 85 and 96 bpm.  The patient had 8 auto triggered recordings.  Symptoms were not reported.  The patient's rhythm demonstrated normal sinus rhythm with heart rates ranging between 91 and 116 bpm.     Impression:  Essentially normal multi day patient activated monitor.  Patient's symptomatic recordings correlated with normal sinus rhythm.  No sustained atrial or ventricular tachyarrhythmia.  No profound bradycardia or significant pauses.    ECHO 7/25/2023 (report reviewed):   The left ventricle is normal in size.  Left ventricular function is normal.The ejection fraction is 55-60%.  No hemodynamically significant valvular abnormalities on 2D or color flow imaging.    CTA chest/abdomen/pelvis 7/24/2023 (report reviewed):  1.  No evidence for aortic dissection or aneurysm.  2.  Mosaic attenuation pattern both lower lung fields suggesting air trapping or small airways/vessel disease. Otherwise no  acute pulmonary disease.  3.  No acute disease within the abdomen or pelvis.    MRI/MRA head/neck 1/13/2021 (report reviewed):  HEAD MRI:   1.  Tiny linear focus of diffusion restriction within the right dorsal diomedes suspicious for acute small vessel infarct. This is new compared to 12/07/2020.  2.  No hemorrhagic transformation or mass effect. No additional infarct identified.  3.  Mild presumed microvascular ischemic change within the cerebral white matter.     HEAD MRA:   1.  No aneurysm, high flow AVM or significant stenosis identified.  2.  Variant [place] of Farrell anatomy as above.     NECK MRA:  1.  Evaluation degraded by suboptimal timing of the contrast bolus and significant venous contamination the gadolinium bolus MRA.  2.  No hemodynamically significant stenosis in the carotid circulation by NASCET criteria.  3.  Poor visualization of the left vertebral artery origin and proximal left V1 segment. This may be artifactual, but it is difficult to exclude high-grade stenosis in this region.    Cardiac stress MRI 7/24/2019 (report reviewed):  1. Lexiscan stress cardiac MRI is negative for inducible myocardial ischemia.  2. Lexiscan stress ECG is negative for inducible myocardial ischemia.  3. No previous myocardial infarction is identified.  4. Small left ventricular size, wall thickness and function. The calculated left ventricular ejection fraction is 69%.  5. Normal right ventricular size and function.  6. No obvious valvular disease.    Stress test 4/17/2023 (report reviewed):     The nuclear stress test is negative for inducible myocardial ischemia or infarction.    The patient is at a low risk of future cardiac ischemic events.    Left ventricular function is hyperdynamic.    The left ventricular ejection fraction at stress is greater than 70%.    A prior study was conducted on 1/22/2019.  This study has changes noted when compared with the prior study. There is now evidence of transient ischemic  dilation.    Stress to rest cavity ratio is 1.38.  TID is present quantitatively but not visually.  This is a nonspecific finding that can indicate the presence of subendocardial ischemia and clinical correlation recommended.    Cardiac cath 5/5/2022 (report reviewed):  Dist RCA lesion is 25% stenosed.  Prox Cx lesion is 30% stenosed.  LAD stent widely patent    Cardiac cath 1/25/2018 (report reviewed):   Angiography:  LM normal  LAD prox into mid 70% lesion at diagonal bifurcation with FFR 0.80  Circ mild dz  RCA normal     PCI: deployed [id number] synergy VINICIO, post dilated prox edge with 3.5mm balloon but stent not apposed, deployed 4x8mm synergy VINICIO properly appose stent, confirmed by IVUS.       Medications  Allergies   Current Outpatient Medications   Medication Sig Dispense Refill    acetaminophen (TYLENOL) 500 MG tablet TAKE 1 PILL BY MOUTH EVERY 6 HOURS AS NEEDED FOR PAIN 100 tablet 10    albuterol (PROAIR HFA/PROVENTIL HFA/VENTOLIN HFA) 108 (90 Base) MCG/ACT inhaler Inhale 2 puffs into the lungs every 4 hours as needed for shortness of breath / dyspnea 4 g 11    albuterol (PROVENTIL) (2.5 MG/3ML) 0.083% neb solution Take 1 vial (2.5 mg) by nebulization every 6 hours as needed for shortness of breath, wheezing or cough 90 mL 1    amitriptyline (ELAVIL) 50 MG tablet Take 1 tablet (50 mg) by mouth At Bedtime 90 tablet 1    ASPIRIN LOW DOSE 81 MG EC tablet TAKE 1 TABLET (81 MG TOTAL) BY MOUTH DAILY. 90 tablet 3    atorvastatin (LIPITOR) 80 MG tablet TAKE 1 TABLET (80 MG TOTAL) BY MOUTH AT BEDTIME FOR CHOLESTEROL 30 tablet 3    B-D U/F 31G X 8 MM insulin pen needle USE ONE PEN NEEDLE DAILY AS NEEDED FOR  each 1    calcium carbonate (TUMS) 500 MG chewable tablet Take 1 tablet (500 mg) by mouth 2 times daily as needed for heartburn 30 tablet 0    colchicine (COLCRYS) 0.6 MG tablet Take 1 tablet (0.6 mg) by mouth daily 30 tablet 0    Continuous Blood Gluc Sensor (FREESTYLE MONICA 2 SENSOR) MISC 1 each every 14  days Use 1 sensor every 14 days. Use to read blood sugars per 's instructions. 2 each 11    cyclobenzaprine (FLEXERIL) 5 MG tablet Take 5 mg by mouth 3 times daily as needed for muscle spasms      dextromethorphan (DELSYM) 30 MG/5ML liquid Take 10 mLs (60 mg) by mouth 2 times daily as needed for cough 178 mL 0    diclofenac (VOLTAREN) 1 % topical gel Apply 4 g topically 4 times daily 350 g 3    docusate sodium (COLACE) 100 MG capsule Take 1 capsule (100 mg) by mouth 2 times daily as needed for constipation 90 capsule 3    dupilumab (DUPIXENT) 300 MG/2ML prefilled pen Inject 2 mLs (300 mg) Subcutaneous every 14 days 4 mL 5    FLUoxetine (PROZAC) 10 MG capsule TAKE 1 CAPSULE (10 MG) BY MOUTH DAILY 90 capsule 2    fluticasone-vilanterol (BREO ELLIPTA) 200-25 MCG/ACT inhaler Inhale 1 puff into the lungs daily 60 each 11    gabapentin (NEURONTIN) 300 MG capsule Take 2 capsules (600 mg) by mouth 3 times daily 540 capsule 3    hydrochlorothiazide (MICROZIDE) 12.5 MG capsule TAKE 1 CAPSULE (12.5 MG TOTAL) BY MOUTH DAILY FOR BLOOD PRESSURE 90 capsule 2    hydrocortisone (CORTAID) 1 % external cream Apply topically 2 times daily 60 g 0    ibuprofen (ADVIL/MOTRIN) 600 MG tablet Take 600 mg by mouth every 6 hours as needed for moderate pain      insulin aspart (NOVOLOG PEN) 100 UNIT/ML pen Inject 10 Units Subcutaneous 2 times daily (with meals) 15 mL 3    insulin glargine (LANTUS PEN) 100 UNIT/ML pen Inject 40 Units Subcutaneous every morning 45 mL 3    insulin pen needle (32G X 4 MM) 32G X 4 MM miscellaneous Use one pen needles daily or as directed. 200 each 11    ipratropium - albuterol 0.5 mg/2.5 mg/3 mL (DUONEB) 0.5-2.5 (3) MG/3ML neb solution Take 1 vial (3 mLs) by nebulization every 6 hours as needed for shortness of breath / dyspnea or wheezing 360 mL 11    lidocaine (XYLOCAINE) 5 % external ointment Apply topically 3 times daily as needed Small amount (finger tip amount) to chest tid prn pain 35.44 g 0     "loratadine (ALLERGY RELIEF) 10 MG tablet TAKE 1 TABLET (10 MG TOTAL) BY MOUTH DAILY FOR ALLERGIES 30 tablet 3    meclizine (ANTIVERT) 12.5 MG tablet TAKE 2 TABLETS (25 MG) BY MOUTH 3 TIMES DAILY AS NEEDED FOR DIZZINESS 90 tablet 0    metFORMIN (GLUCOPHAGE XR) 500 MG 24 hr tablet Take 1 tablet (500 mg) by mouth 2 times daily (with meals) 180 tablet 3    metoprolol tartrate (LOPRESSOR) 25 MG tablet TAKE 1 TABLET (25 MG TOTAL) BY MOUTH 2 (TWO) TIMES A DAY FOR BLOOD PRESSURE 180 tablet 3    montelukast (SINGULAIR) 10 MG tablet Take 1 tablet (10 mg) by mouth At Bedtime 30 tablet 11    omeprazole (PRILOSEC) 40 MG DR capsule Take 1 capsule (40 mg) by mouth daily 90 capsule 3    Polyethylene Glycol 3350 (PEG 3350) 17 GM/SCOOP POWD MIX 17 GRAMS WITH LIQUID AND DRINK ONCE DAILY FOR CONSTIPATION 1530 g 3    predniSONE (DELTASONE) 20 MG tablet Take 20 mg twice daily for 5 days then 20 mg daily 100 tablet 0    sucralfate (CARAFATE) 1 GM/10ML suspension Take 10 mLs (1 g) by mouth 4 times daily 3600 mL 1    tiotropium (SPIRIVA RESPIMAT) 2.5 MCG/ACT inhaler Inhale 2 puffs into the lungs daily 4 g 11    topiramate (TOPAMAX) 25 MG tablet Take 1 tablet at night. Can increase to 2 tabs/night if needed. 180 tablet 1      No Known Allergies     Physical Examination Review of Systems   /70 (BP Location: Right arm, Patient Position: Sitting, Cuff Size: Adult Regular)   Pulse 93   Resp 16   Ht 1.676 m (5' 6\")   Wt 55.3 kg (122 lb)   SpO2 97%   BMI 19.69 kg/m    Body mass index is 19.69 kg/m .  Wt Readings from Last 3 Encounters:   09/14/23 55.3 kg (122 lb)   09/13/23 55.7 kg (122 lb 12 oz)   08/24/23 56.2 kg (124 lb)       General Appearance:   Pleasant  female, appears  stated age. no acute distress, normal body habitus   ENT/Mouth: membranes moist, no apparent gingival bleeding.      EYES:  no scleral icterus, normal conjunctivae   Neck: no carotid bruits. No anterior cervical lymphadenopaty   Respiratory:   Diffuse rhonchi " and wheezing equal chest wall expansion    Cardiovascular:   Regular rhythm, normal rate. Normal first and second heart sounds with no murmurs, rubs, or gallops; the carotid, radial and posterior tibial pulses are intact, Jugular venous pressure normal, no edema bilaterally    Abdomen/GI:  no organomegaly, masses, bruits, or tenderness; bowel sounds are present   Extremities: no cyanosis or clubbing   Skin: no xanthelasma, warm.    Heme/lymph/ Immunology No apparent bleeding noted.   Neurologic: Alert and oriented. normal gait, no tremors     Psychiatric: Pleasant, calm, appropriate affect.    A complete 10 system review of systems was performed and is negative except as mentioned in the HPI/subjective.         Past History   Past Medical History:   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    Microcytic anemia 11/17/2019    S/P coronary artery stent placement 02/02/2018    TB lung, latent     9 mos INH       Past Surgical History:   Past Surgical History:   Procedure Laterality Date    CORONARY STENT PLACEMENT  2018    CV CORONARY ANGIOGRAM N/A 1/25/2018    Procedure: Coronary Angiogram;  Surgeon: Angelo Serrano MD;  Location: [facility];  Service:     CV CORONARY ANGIOGRAM N/A 5/5/2022    Procedure: Coronary Angiogram;  Surgeon: Irwin Cano MD;  Location: [facility] CATH LAB CV    CV CORONARY ANGIOGRAM  5/5/2022    Procedure: ;  Surgeon: Irwin Cano MD;  Location: [facility]  CATH LAB CV    OH ESOPHAGOGASTRODUODENOSCOPY TRANSORAL DIAGNOSTIC N/A 12/10/2018    Procedure: ESOPHAGOGASTRODUODENOSCOPY (EGD);  Surgeon: Eddie Renteria MD;  Location: [facility];  Service: Gastroenterology    OH ESOPHAGOGASTRODUODENOSCOPY TRANSORAL DIAGNOSTIC N/A 12/3/2020    Procedure: ESOPHAGOGASTRODUODENOSCOPY (EGD) with biospies ;  Surgeon: Avi Crow MD;  Location: [facility];  Service: Gastroenterology    [facility]A 12/10/2018    Procedure: COLONOSCOPY with polypectomy using biopsy forceps;  Surgeon: Eddie Renteria MD;  Location: [facility];  Service: Gastroenterology       Family History:   Family History   Problem Relation Age of Onset    Other - See Comments Mother          of an intestinal problem    Ulcers Father          of gastritis    Breast Cancer No family hx of         Social History:   Social History     Socioeconomic History    Marital status:      Spouse name: Not on file    Number of children: Not on file    Years of education: Not on file    Highest education level: Not on file   Occupational History    Not on file   Tobacco Use    Smoking status: Former     Packs/day: 1.00     Years: 30.00     Pack years: 30.00     Types: Cigarettes     Quit date: 2003     Years since quittin.7     Passive exposure: Never    Smokeless tobacco: Never    Tobacco comments:     No passive exposure   Vaping Use    Vaping Use: Never used   Substance and Sexual Activity    Alcohol use: No    Drug use: No    Sexual activity: Yes     Partners: Male   Other Topics Concern    Parent/sibling w/ CABG, MI or angioplasty before 65F 55M? Not Asked   Social History Narrative    2017 The patient lives with her daughter-in-law (who is present), , son, and 2 grandchildren (total of 6 people). [place].     Social Determinants of Health     Financial Resource Strain: Not on file   Food Insecurity: Not on file   Transportation Needs: Not on file    Physical Activity: Not on file   Stress: Not on file   Social Connections: Not on file   Intimate Partner Violence: Not on file   Housing Stability: Not on file              Lab Results    Chemistry/lipid CBC Cardiac Enzymes/BNP/TSH/INR   Lab Results   Component Value Date    CHOL 125 03/14/2023    HDL 41 (L) 03/14/2023    LDL 41 03/14/2023    TRIG 214 (H) 03/14/2023    CR 0.50 (L) 07/25/2023    BUN 12.4 07/25/2023    POTASSIUM 4.1 07/25/2023     07/25/2023    CO2 25 07/25/2023      Lab Results   Component Value Date    WBC 6.3 08/24/2023    HGB 11.3 (L) 08/24/2023    HCT 35.9 08/24/2023    MCV 82 08/24/2023     08/24/2023    A1C 7.9 (H) 07/10/2023     Lab Results   Component Value Date    A1C 7.9 (H) 07/10/2023    Lab Results   Component Value Date    TROPONINI 0.02 09/06/2022    BNP 15 07/11/2022    NTBNP 26 09/16/2014    TSH 0.59 07/14/2022    INR 1.02 09/06/2022          Bora Ennis MD [facility]  Non-Invasive Cardiologist  [facility]  Pager [phone]      Thank you for allowing me to participate in the care of your patient.      Sincerely,     Bora Ennis MD     [facility]  cc:   Del Hirsch MD  [address]  [place] [address]

## 2023-09-14 NOTE — PROGRESS NOTES
HEART CARE ENCOUNTER NOTE        Assessment/Recommendations   Assessment:    Chest pain attributed to acute pericarditis. She still has symptoms although she reports feeling improvement.  Coronary artery disease status post drug-eluting stenting x1 to the [id number] left anterior descending artery 1/25/2018. Her last coronary angiogram 5/5/2022 showed a patent stent and no new obstructive lesions. Pharmacologic nuclear stress testing 4/17/2023 showed no clear evidence of ischemia. Her current chest pain does not seem anginal.  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  Supraventricular tachycardia. This has been noted on outside telemetry strips but does not appear to have ever been captured on any resting 12-lead electrocardiogram or ambulatory cardiac monitor in our system. She is not having symptoms of this currently.  Hyperlipidemia. Last LDL 41 mg/dL.  Severe obstructive lung disease.  Mild obstructive sleep apnea noted on polysomnography 4/1/2022.    Plan:  Continue colchicine 0.6 mg daily for at least 3 months, with consideration of indefinite therapy if she tolerates this given the benefit of colchicine for secondary prevention of coronary artery disease.  Continue other medications.  Follow-up with Dr. Hirsch in 3 months       A total time of 60 minutes was spent on the date of this encounter.    History of Present Illness   Ms. Kulwant Amin is a 53 year old female with a significant past history of CAD s/p VINICIO x1 to the proximal-mid LAD 1/25/2018, right pontine CVA 1/13/2021, severe obstructive lung disease, SVT, mild FLACO and HLD presenting for urgent hospital follow-up. She normally follows in cardiology clinic with my colleague, Dr. Levi Hirsch.     She was hospitalized with chest pain 7/24/2023 - 7/26/2023 with chest pain diagnosed as acute pericarditis. Troponins were undetectable and BNP was undetectable. ECG showed subtle ST elevations. CRP was elevated at 11. She was started on  colchicine 0.6 mg twice daily for a month then had the dose reduced to 0.6 mg just once daily.    She feels the colchicine has helped. She still feels chest pain, which is a constant tightness in her chest. Nothing seems to make it worse. She has baseline severe dyspnea which is unchanged. No recent palpitations. No light headedness/dizziness, pre-syncope, syncope, lower extremity swelling, paroxysmal nocturnal dyspnea (PND), or orthopnea.     Cardiac Problems and Cardiac Diagnostics     Most Recent Cardiac testing:  ECG dated 7/24/2023 (personaly reviewed and interpreted): SR, overall normal ECG    Pulmonary function testing 4/17/2017 (report reviewed):  FEV1/FVC is 80% and is normal.  FEV1 is 1.05L (39%) predicted and is reduced.  FVC is 1.32L (40%) predicted and reduced.  There was no improvement in spirometry after a single inhaled dose of bronchodilator.  Flow volume loops indicate inconsistent loops.     Impression:  Full Pulmonary Function Test is abnormal.  PFTs are consistent with severe obstructive disease.  Spirometry is not consistent with reversibility.  The results of this test are questionable due to patient's inability to perform the maneuvers according to the ATS standards.  Testing was done with a Romanian .     Sleep study 4/1/2022 (report reviewed):  Assessment:   Mild obstructive sleep apnea     Recommendations:  Based on the presence of mild obstructive sleep apnea and symptoms or comorbidities, treatment could be empirically initiated with Auto?titrating PAP therapy with a range of 5 to 15 cmH2O. Recommend clinical follow up with sleep management team.  Patient may be a candidate for dental appliance through referral to Sleep Dentistry for the treatment of obstructive sleep apnea and/or socially disruptive snoring.  If devices are not acceptable or effective, patient may benefit from evaluation of possible surgical options. If she is interested, would recommend referral to specialized  ENT-Sleep provider.  Advice regarding the risks of drowsy driving.  Suggest optimizing sleep schedule and avoiding sleep deprivation.  Address sleep hygiene  Pharmacologic therapy should be used for management of restless legs syndrome only if present and clinically indicated and not based on the presence of periodic limb movements alone.    Event monitor 10/19/2022 (report reviewed):  Results:  Indication for study: Supraventricular tachycardia  Time monitored: 11 days 23 hours.    The baseline rhythm transmission demonstrated normal sinus rhythm with heart rates in the 90s. The MN interval, QRS duration, and QT interval, all appear to be normal.  The patient had 2 manually activated rhythm recordings.  Symptoms were reported as heart racing.  The patient's rhythm demonstrated normal sinus rhythm with heart rates ranging between 85 and 96 bpm.  The patient had 8 auto triggered recordings.  Symptoms were not reported.  The patient's rhythm demonstrated normal sinus rhythm with heart rates ranging between 91 and 116 bpm.     Impression:  Essentially normal multi day patient activated monitor.  Patient's symptomatic recordings correlated with normal sinus rhythm.  No sustained atrial or ventricular tachyarrhythmia.  No profound bradycardia or significant pauses.    ECHO 7/25/2023 (report reviewed):   The left ventricle is normal in size.  Left ventricular function is normal.The ejection fraction is 55-60%.  No hemodynamically significant valvular abnormalities on 2D or color flow imaging.    CTA chest/abdomen/pelvis 7/24/2023 (report reviewed):  1.  No evidence for aortic dissection or aneurysm.  2.  Mosaic attenuation pattern both lower lung fields suggesting air trapping or small airways/vessel disease. Otherwise no acute pulmonary disease.  3.  No acute disease within the abdomen or pelvis.    MRI/MRA head/neck 1/13/2021 (report reviewed):  HEAD MRI:   1.  Tiny linear focus of diffusion restriction within the right  dorsal diomedes suspicious for acute small vessel infarct. This is new compared to 12/07/2020.  2.  No hemorrhagic transformation or mass effect. No additional infarct identified.  3.  Mild presumed microvascular ischemic change within the cerebral white matter.     HEAD MRA:   1.  No aneurysm, high flow AVM or significant stenosis identified.  2.  Variant [place] of [place] anatomy as above.     NECK MRA:  1.  Evaluation degraded by suboptimal timing of the contrast bolus and significant venous contamination the gadolinium bolus MRA.  2.  No hemodynamically significant stenosis in the carotid circulation by NASCET criteria.  3.  Poor visualization of the left vertebral artery origin and proximal left V1 segment. This may be artifactual, but it is difficult to exclude high-grade stenosis in this region.    Cardiac stress MRI 7/24/2019 (report reviewed):  1. Lexiscan stress cardiac MRI is negative for inducible myocardial ischemia.  2. Lexiscan stress ECG is negative for inducible myocardial ischemia.  3. No previous myocardial infarction is identified.  4. Small left ventricular size, wall thickness and function. The calculated left ventricular ejection fraction is 69%.  5. Normal right ventricular size and function.  6. No obvious valvular disease.    Stress test 4/17/2023 (report reviewed):     The nuclear stress test is negative for inducible myocardial ischemia or infarction.    The patient is at a low risk of future cardiac ischemic events.    Left ventricular function is hyperdynamic.    The left ventricular ejection fraction at stress is greater than 70%.    A prior study was conducted on 1/22/2019.  This study has changes noted when compared with the prior study. There is now evidence of transient ischemic dilation.    Stress to rest cavity ratio is 1.38.  TID is present quantitatively but not visually.  This is a nonspecific finding that can indicate the presence of subendocardial ischemia and clinical  correlation recommended.    Cardiac cath 5/5/2022 (report reviewed):  Dist RCA lesion is 25% stenosed.  Prox Cx lesion is 30% stenosed.  LAD stent widely patent    Cardiac cath 1/25/2018 (report reviewed):   Angiography:  LM normal  LAD prox into mid 70% lesion at diagonal bifurcation with FFR 0.80  Circ mild dz  RCA normal     PCI: deployed [id number] synergy VINICIO, post dilated prox edge with 3.5mm balloon but stent not apposed, deployed 4x8mm synergy VINICIO properly appose stent, confirmed by IVUS.       Medications  Allergies   Current Outpatient Medications   Medication Sig Dispense Refill    acetaminophen (TYLENOL) 500 MG tablet TAKE 1 PILL BY MOUTH EVERY 6 HOURS AS NEEDED FOR PAIN 100 tablet 10    albuterol (PROAIR HFA/PROVENTIL HFA/VENTOLIN HFA) 108 (90 Base) MCG/ACT inhaler Inhale 2 puffs into the lungs every 4 hours as needed for shortness of breath / dyspnea 4 g 11    albuterol (PROVENTIL) (2.5 MG/3ML) 0.083% neb solution Take 1 vial (2.5 mg) by nebulization every 6 hours as needed for shortness of breath, wheezing or cough 90 mL 1    amitriptyline (ELAVIL) 50 MG tablet Take 1 tablet (50 mg) by mouth At Bedtime 90 tablet 1    ASPIRIN LOW DOSE 81 MG EC tablet TAKE 1 TABLET (81 MG TOTAL) BY MOUTH DAILY. 90 tablet 3    atorvastatin (LIPITOR) 80 MG tablet TAKE 1 TABLET (80 MG TOTAL) BY MOUTH AT BEDTIME FOR CHOLESTEROL 30 tablet 3    B-D U/F 31G X 8 MM insulin pen needle USE ONE PEN NEEDLE DAILY AS NEEDED FOR  each 1    calcium carbonate (TUMS) 500 MG chewable tablet Take 1 tablet (500 mg) by mouth 2 times daily as needed for heartburn 30 tablet 0    colchicine (COLCRYS) 0.6 MG tablet Take 1 tablet (0.6 mg) by mouth daily 30 tablet 0    Continuous Blood Gluc Sensor (FREESTYLE MONICA 2 SENSOR) [facility] 1 each every 14 days Use 1 sensor every 14 days. Use to read blood sugars per 's instructions. 2 each 11    cyclobenzaprine (FLEXERIL) 5 MG tablet Take 5 mg by mouth 3 times daily as needed for muscle  spasms      dextromethorphan (DELSYM) 30 MG/5ML liquid Take 10 mLs (60 mg) by mouth 2 times daily as needed for cough 178 mL 0    diclofenac (VOLTAREN) 1 % topical gel Apply 4 g topically 4 times daily 350 g 3    docusate sodium (COLACE) 100 MG capsule Take 1 capsule (100 mg) by mouth 2 times daily as needed for constipation 90 capsule 3    dupilumab (DUPIXENT) 300 MG/2ML prefilled pen Inject 2 mLs (300 mg) Subcutaneous every 14 days 4 mL 5    FLUoxetine (PROZAC) 10 MG capsule TAKE 1 CAPSULE (10 MG) BY MOUTH DAILY 90 capsule 2    fluticasone-vilanterol (BREO ELLIPTA) 200-25 MCG/ACT inhaler Inhale 1 puff into the lungs daily 60 each 11    gabapentin (NEURONTIN) 300 MG capsule Take 2 capsules (600 mg) by mouth 3 times daily 540 capsule 3    hydrochlorothiazide (MICROZIDE) 12.5 MG capsule TAKE 1 CAPSULE (12.5 MG TOTAL) BY MOUTH DAILY FOR BLOOD PRESSURE 90 capsule 2    hydrocortisone (CORTAID) 1 % external cream Apply topically 2 times daily 60 g 0    ibuprofen (ADVIL/MOTRIN) 600 MG tablet Take 600 mg by mouth every 6 hours as needed for moderate pain      insulin aspart (NOVOLOG PEN) 100 UNIT/ML pen Inject 10 Units Subcutaneous 2 times daily (with meals) 15 mL 3    insulin glargine (LANTUS PEN) 100 UNIT/ML pen Inject 40 Units Subcutaneous every morning 45 mL 3    insulin pen needle (32G X 4 MM) 32G X 4 MM miscellaneous Use one pen needles daily or as directed. 200 each 11    ipratropium - albuterol 0.5 mg/2.5 mg/3 mL (DUONEB) 0.5-2.5 (3) MG/3ML neb solution Take 1 vial (3 mLs) by nebulization every 6 hours as needed for shortness of breath / dyspnea or wheezing 360 mL 11    lidocaine (XYLOCAINE) 5 % external ointment Apply topically 3 times daily as needed Small amount (finger tip amount) to chest tid prn pain 35.44 g 0    loratadine (ALLERGY RELIEF) 10 MG tablet TAKE 1 TABLET (10 MG TOTAL) BY MOUTH DAILY FOR ALLERGIES 30 tablet 3    meclizine (ANTIVERT) 12.5 MG tablet TAKE 2 TABLETS (25 MG) BY MOUTH 3 TIMES DAILY AS  "NEEDED FOR DIZZINESS 90 tablet 0    metFORMIN (GLUCOPHAGE XR) 500 MG 24 hr tablet Take 1 tablet (500 mg) by mouth 2 times daily (with meals) 180 tablet 3    metoprolol tartrate (LOPRESSOR) 25 MG tablet TAKE 1 TABLET (25 MG TOTAL) BY MOUTH 2 (TWO) TIMES A DAY FOR BLOOD PRESSURE 180 tablet 3    montelukast (SINGULAIR) 10 MG tablet Take 1 tablet (10 mg) by mouth At Bedtime 30 tablet 11    omeprazole (PRILOSEC) 40 MG DR capsule Take 1 capsule (40 mg) by mouth daily 90 capsule 3    Polyethylene Glycol 3350 (PEG 3350) 17 GM/SCOOP POWD MIX 17 GRAMS WITH LIQUID AND DRINK ONCE DAILY FOR CONSTIPATION 1530 g 3    predniSONE (DELTASONE) 20 MG tablet Take 20 mg twice daily for 5 days then 20 mg daily 100 tablet 0    sucralfate (CARAFATE) 1 GM/10ML suspension Take 10 mLs (1 g) by mouth 4 times daily 3600 mL 1    tiotropium (SPIRIVA RESPIMAT) 2.5 MCG/ACT inhaler Inhale 2 puffs into the lungs daily 4 g 11    topiramate (TOPAMAX) 25 MG tablet Take 1 tablet at night. Can increase to 2 tabs/night if needed. 180 tablet 1      No Known Allergies     Physical Examination Review of Systems   /70 (BP Location: Right arm, Patient Position: Sitting, Cuff Size: Adult Regular)   Pulse 93   Resp 16   Ht 1.676 m (5' 6\")   Wt 55.3 kg (122 lb)   SpO2 97%   BMI 19.69 kg/m    Body mass index is 19.69 kg/m .  Wt Readings from Last 3 Encounters:   09/14/23 55.3 kg (122 lb)   09/13/23 55.7 kg (122 lb 12 oz)   08/24/23 56.2 kg (124 lb)       General Appearance:   Pleasant  female, appears  stated age. no acute distress, normal body habitus   ENT/Mouth: membranes moist, no apparent gingival bleeding.      EYES:  no scleral icterus, normal conjunctivae   Neck: no carotid bruits. No anterior cervical lymphadenopaty   Respiratory:   Diffuse rhonchi and wheezing equal chest wall expansion    Cardiovascular:   Regular rhythm, normal rate. Normal first and second heart sounds with no murmurs, rubs, or gallops; the carotid, radial and posterior " tibial pulses are intact, Jugular venous pressure normal, no edema bilaterally    Abdomen/GI:  no organomegaly, masses, bruits, or tenderness; bowel sounds are present   Extremities: no cyanosis or clubbing   Skin: no xanthelasma, warm.    Heme/lymph/ Immunology No apparent bleeding noted.   Neurologic: Alert and oriented. normal gait, no tremors     Psychiatric: Pleasant, calm, appropriate affect.    A complete 10 system review of systems was performed and is negative except as mentioned in the HPI/subjective.         Past History   Past Medical History:   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    Microcytic anemia 11/17/2019    S/P coronary artery stent placement 02/02/2018    TB lung, latent     9 mos INH       Past Surgical History:   Past Surgical History:   Procedure Laterality Date    CORONARY STENT PLACEMENT  2018    CV CORONARY ANGIOGRAM N/A 1/25/2018    Procedure: Coronary Angiogram;  Surgeon: Angelo Serrano MD;  Location: [facility] Cath Lab;  Service:     CV CORONARY ANGIOGRAM N/A 5/5/2022    Procedure: Coronary Angiogram;  Surgeon: Irwin Cano MD;  Location: [facility] CATH LAB CV    CV CORONARY ANGIOGRAM  5/5/2022    Procedure: ;  Surgeon: Irwin Cano MD;  Location: [facility] CV    AR ESOPHAGOGASTRODUODENOSCOPY TRANSORAL DIAGNOSTIC N/A 12/10/2018    Procedure: ESOPHAGOGASTRODUODENOSCOPY (EGD);  Surgeon: Eddie Renteria MD;  Location: [facility] GI;  Service:  Gastroenterology    [place] ESOPHAGOGASTRODUODENOSCOPY TRANSORAL DIAGNOSTIC N/A 12/3/2020    Procedure: ESOPHAGOGASTRODUODENOSCOPY (EGD) with biospies ;  Surgeon: Avi Crow MD;  Location: [facility];  Service: Gastroenterology    [facility] COLONOSCOPY W/WO BRUSH/WASH N/A 12/10/2018    Procedure: COLONOSCOPY with polypectomy using biopsy forceps;  Surgeon: Eddie Renteria MD;  Location: [facility];  Service: Gastroenterology       Family History:   Family History   Problem Relation Age of Onset    Other - See Comments Mother          of an intestinal problem    Ulcers Father          of gastritis    Breast Cancer No family hx of         Social History:   Social History     Socioeconomic History    Marital status:      Spouse name: Not on file    Number of children: Not on file    Years of education: Not on file    Highest education level: Not on file   Occupational History    Not on file   Tobacco Use    Smoking status: Former     Packs/day: 1.00     Years: 30.00     Pack years: 30.00     Types: Cigarettes     Quit date: 2003     Years since quittin.7     Passive exposure: Never    Smokeless tobacco: Never    Tobacco comments:     No passive exposure   Vaping Use    Vaping Use: Never used   Substance and Sexual Activity    Alcohol use: No    Drug use: No    Sexual activity: Yes     Partners: Male   Other Topics Concern    Parent/sibling w/ CABG, MI or angioplasty before 65F 55M? Not Asked   Social History Narrative    2017 The patient lives with her daughter-in-law (who is present), , son, and 2 grandchildren (total of 6 people). [place].     Social Determinants of Health     Financial Resource Strain: Not on file   Food Insecurity: Not on file   Transportation Needs: Not on file   Physical Activity: Not on file   Stress: Not on file   Social Connections: Not on file   Intimate Partner Violence: Not on file   Housing Stability: Not on file              Lab Results     Chemistry/lipid CBC Cardiac Enzymes/BNP/TSH/INR   Lab Results   Component Value Date    CHOL 125 03/14/2023    HDL 41 (L) 03/14/2023    LDL 41 03/14/2023    TRIG 214 (H) 03/14/2023    CR 0.50 (L) 07/25/2023    BUN 12.4 07/25/2023    POTASSIUM 4.1 07/25/2023     07/25/2023    CO2 25 07/25/2023      Lab Results   Component Value Date    WBC 6.3 08/24/2023    HGB 11.3 (L) 08/24/2023    HCT 35.9 08/24/2023    MCV 82 08/24/2023     08/24/2023    A1C 7.9 (H) 07/10/2023     Lab Results   Component Value Date    A1C 7.9 (H) 07/10/2023    Lab Results   Component Value Date    TROPONINI 0.02 09/06/2022    BNP 15 07/11/2022    NTBNP 26 09/16/2014    TSH 0.59 07/14/2022    INR 1.02 09/06/2022          Bora Ennis MD [facility]  Non-Invasive Cardiologist  [facility]  Pager [phone]

## 2023-09-18 NOTE — TELEPHONE ENCOUNTER
3rd attempt to reach patient. Left message to return call.     Just needs updated local pharmacy for Mounjaro   Re-faxed both forms that were both signed on 12/29/18.  To [facility] .  Successful fax confirmation to [phone] received. Closing task. Thanks.

## 2023-10-01 ENCOUNTER — APPOINTMENT (OUTPATIENT)
Dept: RADIOLOGY | Facility: HOSPITAL | Age: 53
End: 2023-10-01
Attending: EMERGENCY MEDICINE
Payer: COMMERCIAL

## 2023-10-01 ENCOUNTER — HOSPITAL ENCOUNTER (EMERGENCY)
Facility: HOSPITAL | Age: 53
Discharge: HOME | End: 2023-10-01
Attending: EMERGENCY MEDICINE | Admitting: EMERGENCY MEDICINE
Payer: COMMERCIAL

## 2023-10-01 VITALS
DIASTOLIC BLOOD PRESSURE: 76 MMHG | RESPIRATION RATE: 26 BRPM | OXYGEN SATURATION: 93 % | HEIGHT: 58 IN | SYSTOLIC BLOOD PRESSURE: 133 MMHG | WEIGHT: 122 LBS | TEMPERATURE: 97.8 F | BODY MASS INDEX: 25.61 KG/M2 | HEART RATE: 94 BPM

## 2023-10-01 DIAGNOSIS — R06.2 WHEEZING: ICD-10-CM

## 2023-10-01 LAB
ANION GAP SERPL CALCULATED.3IONS-SCNC: 12 MMOL/L (ref 7–15)
BUN SERPL-MCNC: 9.2 MG/DL (ref 6–20)
CALCIUM SERPL-MCNC: 9.1 MG/DL (ref 8.6–10)
CHLORIDE SERPL-SCNC: 102 MMOL/L (ref 98–107)
CREAT SERPL-MCNC: 0.53 MG/DL (ref 0.51–0.95)
DEPRECATED HCO3 PLAS-SCNC: 22 MMOL/L (ref 22–29)
EGFRCR SERPLBLD CKD-EPI 2021: >90 ML/MIN/1.73M2
ERYTHROCYTE [DISTWIDTH] IN BLOOD BY AUTOMATED COUNT: 15.7 % (ref 10–15)
FLUAV RNA SPEC QL NAA+PROBE: NEGATIVE
FLUBV RNA RESP QL NAA+PROBE: NEGATIVE
GLUCOSE SERPL-MCNC: 201 MG/DL (ref 70–99)
HCT VFR BLD AUTO: 38.4 % (ref 35–47)
HGB BLD-MCNC: 11.6 G/DL (ref 11.7–15.7)
MCH RBC QN AUTO: 24.5 PG (ref 26.5–33)
MCHC RBC AUTO-ENTMCNC: 30.2 G/DL (ref 31.5–36.5)
MCV RBC AUTO: 81 FL (ref 78–100)
PLATELET # BLD AUTO: 230 10E3/UL (ref 150–450)
POTASSIUM SERPL-SCNC: 4.3 MMOL/L (ref 3.4–5.3)
RBC # BLD AUTO: 4.73 10E6/UL (ref 3.8–5.2)
RSV RNA SPEC NAA+PROBE: NEGATIVE
SARS-COV-2 RNA RESP QL NAA+PROBE: NEGATIVE
SODIUM SERPL-SCNC: 136 MMOL/L (ref 135–145)
TROPONIN T SERPL HS-MCNC: <6 NG/L
WBC # BLD AUTO: 7.2 10E3/UL (ref 4–11)

## 2023-10-01 PROCEDURE — 87637 SARSCOV2&INF A&B&RSV AMP PRB: CPT | Performed by: EMERGENCY MEDICINE

## 2023-10-01 PROCEDURE — 94640 AIRWAY INHALATION TREATMENT: CPT

## 2023-10-01 PROCEDURE — 85027 COMPLETE CBC AUTOMATED: CPT | Performed by: EMERGENCY MEDICINE

## 2023-10-01 PROCEDURE — 84484 ASSAY OF TROPONIN QUANT: CPT | Performed by: EMERGENCY MEDICINE

## 2023-10-01 PROCEDURE — 93005 ELECTROCARDIOGRAM TRACING: CPT | Performed by: EMERGENCY MEDICINE

## 2023-10-01 PROCEDURE — 36415 COLL VENOUS BLD VENIPUNCTURE: CPT | Performed by: EMERGENCY MEDICINE

## 2023-10-01 PROCEDURE — 80048 BASIC METABOLIC PNL TOTAL CA: CPT | Performed by: EMERGENCY MEDICINE

## 2023-10-01 PROCEDURE — 99285 EMERGENCY DEPT VISIT HI MDM: CPT | Mod: 25

## 2023-10-01 PROCEDURE — 250N000012 HC RX MED GY IP 250 OP 636 PS 637: Performed by: EMERGENCY MEDICINE

## 2023-10-01 PROCEDURE — 250N000009 HC RX 250: Performed by: EMERGENCY MEDICINE

## 2023-10-01 PROCEDURE — 71046 X-RAY EXAM CHEST 2 VIEWS: CPT

## 2023-10-01 RX ORDER — PREDNISONE 20 MG/1
40 TABLET ORAL ONCE
Status: COMPLETED | OUTPATIENT
Start: 2023-10-01 | End: 2023-10-01

## 2023-10-01 RX ORDER — IPRATROPIUM BROMIDE AND ALBUTEROL SULFATE 2.5; .5 MG/3ML; MG/3ML
3 SOLUTION RESPIRATORY (INHALATION) ONCE
Status: COMPLETED | OUTPATIENT
Start: 2023-10-01 | End: 2023-10-01

## 2023-10-01 RX ORDER — PREDNISONE 20 MG/1
40 TABLET ORAL DAILY
Qty: 8 TABLET | Refills: 0 | Status: SHIPPED | OUTPATIENT
Start: 2023-10-01 | End: 2023-10-05

## 2023-10-01 RX ADMIN — PREDNISONE 40 MG: 20 TABLET ORAL at 10:57

## 2023-10-01 RX ADMIN — IPRATROPIUM BROMIDE AND ALBUTEROL SULFATE 3 ML: .5; 3 SOLUTION RESPIRATORY (INHALATION) at 09:34

## 2023-10-01 NOTE — DISCHARGE INSTRUCTIONS
Your testing today has been normal.  Take the prescribed steroid medication as directed starting tomorrow and follow-up closely with your primary care doctor.  Return to the ER for any worsening symptoms or other concerns.

## 2023-10-01 NOTE — ED TRIAGE NOTES
Patient with chest pain, cough, and wheezing that started yesterday.  C/o midsternal chest pain.  Hx of COPD- baseline has cough and wheezing but is worse.  Denies any recent COVID testing or known exposures.      Triage Assessment       Row Name 10/01/23 0914       Triage Assessment (Adult)    Airway WDL WDL       Respiratory WDL    Respiratory WDL X;cough    Cough Frequency frequent    Cough Type dry       Skin Circulation/Temperature WDL    Skin Circulation/Temperature WDL WDL       Cardiac WDL    Cardiac WDL chest pain       Chest Pain Assessment    Chest Pain Location midsternal       Peripheral/Neurovascular WDL    Peripheral Neurovascular WDL WDL       Cognitive/Neuro/Behavioral WDL    Cognitive/Neuro/Behavioral WDL WDL

## 2023-10-01 NOTE — ED PROVIDER NOTES
EMERGENCY DEPARTMENT ENCOUnter      NAME: Kulwant Amin  AGE: 53 year old female  YOB: 1970  MRN: [id number]  EVALUATION DATE & TIME: 10/1/2023  9:16 AM    PCP: Adrien Cardoza    ED PROVIDER: Sunny Jeffries DO      Chief Complaint   Patient presents with    Chest Pain    Cough         FINAL IMPRESSION:  1. Wheezing          ED COURSE & MEDICAL DECISION MAKING:    The patient presented to the emergency department today complaint of chest pain and shortness of breath.  She had expiratory wheezing on exam.  Her symptoms are better after a nebulizer treatment.  Laboratory testing including troponin along with EKG is unremarkable.  COVID and influenza test are negative and chest x-ray is clear.  Given her improvement with the nebulizer treatment and her otherwise unremarkable work-up, I feel she can be safely discharged home.  She has been given a dose of steroids and I will prescribe additional steroids to take over the next few days.  She has been encouraged to follow-up closely on an outpatient basis and return for worsening symptoms or other concerns.    Medical Decision Making    History:  Supplemental history from: Documented in chart, if applicable  External Record(s) reviewed: Documented in chart, if applicable.    Work Up:  Chart documentation includes differential considered and any EKGs or imaging independently interpreted by provider, where specified.  In additional to work up documented, I considered the following work up: Documented in chart, if applicable.    External consultation:  Discussion of management with another provider: Documented in chart, if applicable    Complicating factors:  Care impacted by chronic illness: N/A  Care affected by social determinants of health: N/A    Disposition considerations: Discharge. I prescribed additional prescription strength medication(s) as charted. I considered admission, but discharged patient after significant clinical improvement.        At the  conclusion of the encounter I discussed the results of all of the tests and the disposition. The questions were answered. The patient or family acknowledged understanding and was agreeable with the care plan.         MEDICATIONS GIVEN IN THE EMERGENCY:  Medications   ipratropium - albuterol 0.5 mg/2.5 mg/3 mL (DUONEB) neb solution 3 mL (3 mLs Nebulization $Given 10/1/23 7336)   predniSONE (DELTASONE) tablet 40 mg (40 mg Oral $Given 10/1/23 8347)       NEW PRESCRIPTIONS STARTED AT TODAY'S ER VISIT  Discharge Medication List as of 10/1/2023 10:57 AM        START taking these medications    Details   !! predniSONE (DELTASONE) 20 MG tablet Take 2 tablets (40 mg) by mouth daily for 4 days Take two tablets (= 40mg) each day for 4 (four) days, Disp-8 tablet, R-0, E-Prescribe       !! - Potential duplicate medications found. Please discuss with provider.             =================================================================    HPI        Kulwant Amin is a 53 year old female with a pertinent history of asthma who presents to the emergency department today complaining of shortness of breath and chest discomfort over the past few days.  Family states that she has been using her inhaler only minimal improvement.  She states that the chest pain is across the central chest.  No known recent COVID exposure.  No other current complaints.          PAST MEDICAL HISTORY:  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     Microcytic anemia 11/17/2019    S/P coronary artery stent placement 02/02/2018    TB lung, latent     9 mos INH       PAST SURGICAL HISTORY:  Past Surgical History:   Procedure Laterality Date    CORONARY STENT PLACEMENT  2018    CV CORONARY ANGIOGRAM N/A 1/25/2018    Procedure: Coronary Angiogram;  Surgeon: Angelo Serrano MD;  Location: [facility] Cath Lab;  Service:     CV CORONARY ANGIOGRAM N/A 5/5/2022    Procedure: Coronary Angiogram;  Surgeon: Irwin Cano MD;  Location: [facility] CATH LAB CV    CV CORONARY ANGIOGRAM  5/5/2022    Procedure: ;  Surgeon: Irwin Caon MD;  Location: [facility] LAB CV    AZ ESOPHAGOGASTRODUODENOSCOPY TRANSORAL DIAGNOSTIC N/A 12/10/2018    Procedure: ESOPHAGOGASTRODUODENOSCOPY (EGD);  Surgeon: Eddie Renteria MD;  Location: [facility];  Service: Gastroenterology    AZ ESOPHAGOGASTRODUODENOSCOPY TRANSORAL DIAGNOSTIC N/A 12/3/2020    Procedure: ESOPHAGOGASTRODUODENOSCOPY (EGD) with biospies ;  Surgeon: Avi Crow MD;  Location: [facility];  Service: Gastroenterology    [facility] COLONOSCOPY W/WO BRUSH/WASH N/A 12/10/2018    Procedure: COLONOSCOPY with polypectomy using biopsy forceps;  Surgeon: Eddie Renteria MD;  Location: [facility];  Service: Gastroenterology           CURRENT MEDICATIONS:    predniSONE (DELTASONE) 20 MG tablet  acetaminophen (TYLENOL) 500 MG tablet  albuterol (PROAIR HFA/PROVENTIL HFA/VENTOLIN HFA) 108 (90 Base) MCG/ACT inhaler  albuterol (PROVENTIL) (2.5 MG/3ML) 0.083% neb solution  amitriptyline (ELAVIL) 50 MG tablet  ASPIRIN LOW DOSE 81 MG EC tablet  atorvastatin (LIPITOR) 80 MG tablet  B-D U/F 31G X 8 MM insulin pen needle  calcium carbonate (TUMS) 500 MG chewable tablet  colchicine (COLCRYS) 0.6 MG tablet  Continuous Blood Gluc Sensor (FREESTYLE MONICA 2 SENSOR) MISC  cyclobenzaprine (FLEXERIL) 5 MG tablet  dextromethorphan (DELSYM) 30 MG/5ML liquid  diclofenac (VOLTAREN) 1 % topical gel  docusate sodium (COLACE) 100 MG  capsule  dupilumab (DUPIXENT) 300 MG/2ML prefilled pen  FLUoxetine (PROZAC) 10 MG capsule  fluticasone-vilanterol (BREO ELLIPTA) 200-25 MCG/ACT inhaler  gabapentin (NEURONTIN) 300 MG capsule  hydrochlorothiazide (MICROZIDE) 12.5 MG capsule  hydrocortisone (CORTAID) 1 % external cream  ibuprofen (ADVIL/MOTRIN) 600 MG tablet  insulin aspart (NOVOLOG PEN) 100 UNIT/ML pen  insulin glargine (LANTUS PEN) 100 UNIT/ML pen  insulin pen needle (32G X 4 MM) 32G X 4 MM miscellaneous  ipratropium - albuterol 0.5 mg/2.5 mg/3 mL (DUONEB) 0.5-2.5 (3) MG/3ML neb solution  lidocaine (XYLOCAINE) 5 % external ointment  loratadine (ALLERGY RELIEF) 10 MG tablet  meclizine (ANTIVERT) 12.5 MG tablet  metFORMIN (GLUCOPHAGE XR) 500 MG 24 hr tablet  metoprolol tartrate (LOPRESSOR) 25 MG tablet  montelukast (SINGULAIR) 10 MG tablet  omeprazole (PRILOSEC) 40 MG DR capsule  Polyethylene Glycol 3350 (PEG 3350) 17 GM/SCOOP POWD  predniSONE (DELTASONE) 20 MG tablet  sucralfate (CARAFATE) 1 GM/10ML suspension  tiotropium (SPIRIVA RESPIMAT) 2.5 MCG/ACT inhaler  topiramate (TOPAMAX) 25 MG tablet        ALLERGIES:  No Known Allergies    FAMILY HISTORY:  Family History   Problem Relation Age of Onset    Other - See Comments Mother          of an intestinal problem    Ulcers Father          of gastritis    Breast Cancer No family hx of        SOCIAL HISTORY:   Social History     Socioeconomic History    Marital status:    Tobacco Use    Smoking status: Former     Packs/day: 1.00     Years: 30.00     Pack years: 30.00     Types: Cigarettes     Quit date: 2003     Years since quittin.7     Passive exposure: Never    Smokeless tobacco: Never    Tobacco comments:     No passive exposure   Vaping Use    Vaping Use: Never used   Substance and Sexual Activity    Alcohol use: No    Drug use: No    Sexual activity: Yes     Partners: Male   Social History Narrative    2017 The patient lives with her daughter-in-law (who is present),  ", son, and 2 grandchildren (total of 6 people). Immigrant.       VITALS:  Patient Vitals for the past 24 hrs:   BP Temp Temp src Pulse Resp SpO2 Height Weight   10/01/23 1057 133/76 -- -- 94 -- 93 % -- --   10/01/23 0930 134/62 -- -- 91 26 96 % -- --   10/01/23 0913 (!) 136/92 97.8  F (36.6  C) Temporal 105 20 100 % 1.473 m (4' 10\") 55.3 kg (122 lb)       PHYSICAL EXAM    Constitutional:  Well developed, Well nourished,  HENT:  Normocephalic, Atraumatic, Oropharynx moist, Nose normal.   Eyes:  EOMI, Conjunctiva normal, No discharge.   Respiratory:  No respiratory distress, mild expiratory wheezing bilaterally, No chest tenderness.   Cardiovascular:  Normal heart rate, Normal rhythm, No murmurs  GI:  Soft, No tenderness, No guarding, No CVA tenderness.   Musculoskeletal:  No tenderness to palpation or major deformities noted.   Extremities: No lower extremity edema.  Neurologic:  Alert & oriented x 3, No focal deficits noted.   Psychiatric:  Affect normal, Judgment normal, Mood normal.        LAB:  All pertinent labs reviewed and interpreted.  Results for orders placed or performed during the hospital encounter of 10/01/23              CBC with platelets   Result Value Ref Range    WBC Count 7.2 4.0 - 11.0 10e3/uL    RBC Count 4.73 3.80 - 5.20 10e6/uL    Hemoglobin 11.6 (L) 11.7 - 15.7 g/dL    Hematocrit 38.4 35.0 - 47.0 %    MCV 81 78 - 100 fL    MCH 24.5 (L) 26.5 - 33.0 pg    MCHC 30.2 (L) 31.5 - 36.5 g/dL    RDW 15.7 (H) 10.0 - 15.0 %    Platelet Count 230 150 - 450 10e3/uL   Basic metabolic panel   Result Value Ref Range    Sodium 136 135 - 145 mmol/L    Potassium 4.3 3.4 - 5.3 mmol/L    Chloride 102 98 - 107 mmol/L    Carbon Dioxide (CO2) 22 22 - 29 mmol/L    Anion Gap 12 7 - 15 mmol/L    Urea Nitrogen 9.2 6.0 - 20.0 mg/dL    Creatinine 0.53 0.51 - 0.95 mg/dL    GFR Estimate >90 >60 mL/min/1.73m2    Calcium 9.1 8.6 - 10.0 mg/dL    Glucose 201 (H) 70 - 99 mg/dL   Result Value Ref Range    Troponin T, High " Sensitivity <6 <=14 ng/L   Symptomatic Influenza A/B, RSV, & SARS-CoV2 PCR (COVID-19) Nasopharyngeal    Specimen: Nasopharyngeal; Swab   Result Value Ref Range    Influenza A PCR Negative Negative    Influenza B PCR Negative Negative    RSV PCR Negative Negative    SARS CoV2 PCR Negative Negative       RADIOLOGY:  I have independently reviewed and interpreted the above imaging, pending the final radiology read.  XR Chest 2 Views   Preliminary Result   IMPRESSION: Negative chest.             EKG:    Normal sinus rhythm at 94 bpm.  Left axis deviation.  No signs of acute ischemia.  QRS 86 ms, QTc 455 ms.    I have independently reviewed and interpreted this EKG          Sunny Jeffries DO  Emergency Medicine  [facility] EMERGENCY DEPARTMENT  [facility] [facility] [id number] [phone]  Dept: [phone]     Sunny Jeffries MD  10/01/23 4569

## 2023-10-02 LAB
ATRIAL RATE - MUSE: 94 BPM
DIASTOLIC BLOOD PRESSURE - MUSE: NORMAL MMHG
INTERPRETATION ECG - MUSE: NORMAL
P AXIS - MUSE: 56 DEGREES
PR INTERVAL - MUSE: 144 MS
QRS DURATION - MUSE: 86 MS
QT - MUSE: 364 MS
QTC - MUSE: 455 MS
R AXIS - MUSE: -69 DEGREES
SYSTOLIC BLOOD PRESSURE - MUSE: NORMAL MMHG
T AXIS - MUSE: 55 DEGREES
VENTRICULAR RATE- MUSE: 94 BPM

## 2023-10-05 ENCOUNTER — OFFICE VISIT (OUTPATIENT)
Dept: FAMILY MEDICINE | Facility: CLINIC | Age: 53
End: 2023-10-05
Payer: COMMERCIAL

## 2023-10-05 VITALS
DIASTOLIC BLOOD PRESSURE: 70 MMHG | HEART RATE: 89 BPM | BODY MASS INDEX: 25.82 KG/M2 | RESPIRATION RATE: 24 BRPM | TEMPERATURE: 97.8 F | OXYGEN SATURATION: 99 % | HEIGHT: 58 IN | SYSTOLIC BLOOD PRESSURE: 124 MMHG | WEIGHT: 123 LBS

## 2023-10-05 DIAGNOSIS — E78.5 HYPERLIPIDEMIA, UNSPECIFIED HYPERLIPIDEMIA TYPE: Chronic | ICD-10-CM

## 2023-10-05 DIAGNOSIS — I31.9 PERICARDITIS, UNSPECIFIED CHRONICITY, UNSPECIFIED TYPE: ICD-10-CM

## 2023-10-05 DIAGNOSIS — I25.83 CORONARY ARTERY DISEASE DUE TO LIPID RICH PLAQUE: Chronic | ICD-10-CM

## 2023-10-05 DIAGNOSIS — Z79.4 TYPE 2 DIABETES MELLITUS TREATED WITH INSULIN (H): Primary | ICD-10-CM

## 2023-10-05 DIAGNOSIS — Z23 IMMUNIZATION DUE: ICD-10-CM

## 2023-10-05 DIAGNOSIS — J44.9 CHRONIC OBSTRUCTIVE PULMONARY DISEASE, UNSPECIFIED COPD TYPE (H): ICD-10-CM

## 2023-10-05 DIAGNOSIS — R05.1 ACUTE COUGH: ICD-10-CM

## 2023-10-05 DIAGNOSIS — K21.9 GASTROESOPHAGEAL REFLUX DISEASE WITHOUT ESOPHAGITIS: Chronic | ICD-10-CM

## 2023-10-05 DIAGNOSIS — K59.00 CONSTIPATION, UNSPECIFIED CONSTIPATION TYPE: ICD-10-CM

## 2023-10-05 DIAGNOSIS — I25.10 CORONARY ARTERY DISEASE DUE TO LIPID RICH PLAQUE: Chronic | ICD-10-CM

## 2023-10-05 DIAGNOSIS — F33.9 RECURRENT MAJOR DEPRESSIVE DISORDER, REMISSION STATUS UNSPECIFIED (H): Chronic | ICD-10-CM

## 2023-10-05 DIAGNOSIS — I10 ESSENTIAL HYPERTENSION: ICD-10-CM

## 2023-10-05 DIAGNOSIS — E11.9 TYPE 2 DIABETES MELLITUS TREATED WITH INSULIN (H): Primary | ICD-10-CM

## 2023-10-05 PROCEDURE — 90471 IMMUNIZATION ADMIN: CPT | Performed by: FAMILY MEDICINE

## 2023-10-05 PROCEDURE — 90686 IIV4 VACC NO PRSV 0.5 ML IM: CPT | Performed by: FAMILY MEDICINE

## 2023-10-05 PROCEDURE — 99214 OFFICE O/P EST MOD 30 MIN: CPT | Mod: 25 | Performed by: FAMILY MEDICINE

## 2023-10-05 RX ORDER — PEN NEEDLE, DIABETIC 31 GX5/16"
NEEDLE, DISPOSABLE MISCELLANEOUS
Qty: 100 EACH | Refills: 1 | Status: SHIPPED | OUTPATIENT
Start: 2023-10-05 | End: 2024-01-22

## 2023-10-05 RX ORDER — ALBUTEROL SULFATE 90 UG/1
2 AEROSOL, METERED RESPIRATORY (INHALATION) EVERY 4 HOURS PRN
Qty: 18 G | Refills: 0 | Status: SHIPPED | OUTPATIENT
Start: 2023-10-05 | End: 2023-10-25

## 2023-10-05 RX ORDER — ALBUTEROL SULFATE 0.83 MG/ML
2.5 SOLUTION RESPIRATORY (INHALATION) EVERY 6 HOURS PRN
Qty: 90 ML | Refills: 1 | Status: CANCELLED | OUTPATIENT
Start: 2023-10-05

## 2023-10-05 RX ORDER — NYSTATIN 100000/ML
1000000 SUSPENSION, ORAL (FINAL DOSE FORM) ORAL 4 TIMES DAILY
Status: CANCELLED | OUTPATIENT
Start: 2023-10-05

## 2023-10-05 RX ORDER — DEXTROMETHORPHAN POLISTIREX 30 MG/5ML
60 SUSPENSION ORAL 2 TIMES DAILY PRN
Qty: 178 ML | Refills: 0 | Status: CANCELLED | OUTPATIENT
Start: 2023-10-05

## 2023-10-05 RX ORDER — FLUTICASONE FUROATE AND VILANTEROL 200; 25 UG/1; UG/1
1 POWDER RESPIRATORY (INHALATION) DAILY
Qty: 60 EACH | Refills: 11 | Status: CANCELLED | OUTPATIENT
Start: 2023-10-05

## 2023-10-05 RX ORDER — ALBUTEROL SULFATE 90 UG/1
2 AEROSOL, METERED RESPIRATORY (INHALATION) EVERY 4 HOURS PRN
Qty: 4 G | Refills: 11 | Status: CANCELLED | OUTPATIENT
Start: 2023-10-05

## 2023-10-05 RX ORDER — NYSTATIN 100000/ML
1000000 SUSPENSION, ORAL (FINAL DOSE FORM) ORAL 4 TIMES DAILY
COMMUNITY
End: 2023-12-07

## 2023-10-05 RX ORDER — IPRATROPIUM BROMIDE AND ALBUTEROL SULFATE 2.5; .5 MG/3ML; MG/3ML
3 SOLUTION RESPIRATORY (INHALATION) EVERY 6 HOURS PRN
Qty: 360 ML | Refills: 11 | Status: CANCELLED | OUTPATIENT
Start: 2023-10-05

## 2023-10-05 RX ORDER — DOCUSATE SODIUM 100 MG/1
100 CAPSULE, LIQUID FILLED ORAL 2 TIMES DAILY PRN
Qty: 90 CAPSULE | Refills: 3 | Status: SHIPPED | OUTPATIENT
Start: 2023-10-05 | End: 2023-11-07

## 2023-10-05 RX ORDER — AMITRIPTYLINE HYDROCHLORIDE 50 MG/1
50 TABLET ORAL AT BEDTIME
Qty: 90 TABLET | Refills: 1 | Status: CANCELLED | OUTPATIENT
Start: 2023-10-05

## 2023-10-05 RX ORDER — ALBUTEROL SULFATE 0.83 MG/ML
2.5 SOLUTION RESPIRATORY (INHALATION) EVERY 6 HOURS PRN
Qty: 30 ML | Refills: 0 | Status: SHIPPED | OUTPATIENT
Start: 2023-10-05 | End: 2023-10-25

## 2023-10-05 ASSESSMENT — PATIENT HEALTH QUESTIONNAIRE - PHQ9
SUM OF ALL RESPONSES TO PHQ QUESTIONS 1-9: 7
SUM OF ALL RESPONSES TO PHQ QUESTIONS 1-9: 7
10. IF YOU CHECKED OFF ANY PROBLEMS, HOW DIFFICULT HAVE THESE PROBLEMS MADE IT FOR YOU TO DO YOUR WORK, TAKE CARE OF THINGS AT HOME, OR GET ALONG WITH OTHER PEOPLE: SOMEWHAT DIFFICULT

## 2023-10-05 NOTE — PROGRESS NOTES
Type 2 diabetes mellitus treated with insulin (H)  Commended to hold metformin by Dr. Gil, but patient restarted after 2 days.  Normal creatinine 4 days ago.    Essential hypertension  Controlled.    Recurrent major depressive disorder, remission status unspecified (H24)  Stable.    Constipation, unspecified constipation type  - docusate sodium (COLACE) 100 MG capsule; Take 1 capsule (100 mg) by mouth 2 times daily as needed for constipation    Immunization due  - INFLUENZA VACCINE >6 MONTHS (AFLURIA/FLUZONE)    Gastroesophageal reflux disease without esophagitis  No worsening symptoms.    Hyperlipidemia, unspecified hyperlipidemia type  Recheck in few months.    Chronic obstructive pulmonary disease, unspecified COPD type (H)  Refilled nebulizer solution to last until her pulmonology appointment.  - albuterol (PROVENTIL) (2.5 MG/3ML) 0.083% neb solution; Take 1 vial (2.5 mg) by nebulization every 6 hours as needed for shortness of breath, wheezing or cough    Pericarditis, unspecified chronicity, unspecified type  On colchicine, managed by cardiology.    Coronary artery disease due to lipid rich plaque  Managed by cardiology.    Doug Blum is a 53 year old female with multiple chronic medical conditions seeing multiple specialists here for ED follow-up.  She went to the ED 4 days ago due to cough, wheezing and chest pain with shortness of breath.  Reviewed ED notes.  Labs including troponin, COVID and influenza test are negative.  Chest x-ray was clear.  EKG was unremarkable.  She was sent home from the ED with oral prednisone.  Breathing is improving since ED visit 4 days ago.  No acute fever.  No acute worsening cough.  She is requesting nebulizer solution.  She has pulmonology follow-up appointment scheduled in 2 weeks.  Daughter-in-law reported her blood sugars been high in the 200s since the ED visit.  She saw  on 9/13/2023, note reviewed.  She was instructed to stop taking metformin and  "use Lantus and mealtime insulin.  Patient restarted metformin after holding it for 2 days due to reported worsening heartburn after stopping.  Her last creatinine was normal.  He is currently using NovoLog 10 units before meal and Lantus 40 units once a day.  No acute abdominal pain, no nausea or vomiting.  No telemetry status changes since last ED visit.        10/5/2023    10:42 AM   Additional Questions   Roomed by stephaniear prabha   Accompanied by Daughter in-law     Review of Systems   CONSTITUTIONAL: NEGATIVE for fever, chills, change in weight  CV: NEGATIVE for chest pain/chest pressure      Objective    /70 (BP Location: Right arm, Patient Position: Sitting, Cuff Size: Adult Regular)   Pulse 89   Temp 97.8  F (36.6  C) (Oral)   Resp 24   Ht 1.473 m (4' 10\")   Wt 55.8 kg (123 lb)   SpO2 99%   BMI 25.71 kg/m    Body mass index is 25.71 kg/m .  Physical Exam   GENERAL: healthy, alert and no distress  NECK: Supple.  RESP: no rales , no rhonchi, and mild wheezing bilaterally.  CV: regular rates and rhythm and no murmur, click or rub  ABDOMEN: soft, nontender, no hepatosplenomegaly, no masses and bowel sounds normal  PSYCH: mentation appears normal, affect normal/bright  Diabetic foot exam: no trophic changes or ulcerative lesions and normal sensory exam    This transcription uses voice recognition software, which may contain typographical errors.                "

## 2023-10-08 ENCOUNTER — HOSPITAL ENCOUNTER (EMERGENCY)
Facility: HOSPITAL | Age: 53
Discharge: HOME | End: 2023-10-08
Attending: EMERGENCY MEDICINE | Admitting: EMERGENCY MEDICINE
Payer: COMMERCIAL

## 2023-10-08 VITALS
TEMPERATURE: 98.5 F | RESPIRATION RATE: 20 BRPM | WEIGHT: 122 LBS | OXYGEN SATURATION: 100 % | SYSTOLIC BLOOD PRESSURE: 139 MMHG | BODY MASS INDEX: 25.5 KG/M2 | HEART RATE: 83 BPM | DIASTOLIC BLOOD PRESSURE: 85 MMHG

## 2023-10-08 DIAGNOSIS — Z76.0 ENCOUNTER FOR MEDICATION REFILL: ICD-10-CM

## 2023-10-08 DIAGNOSIS — R04.0 EPISTAXIS: ICD-10-CM

## 2023-10-08 PROCEDURE — 30901 CONTROL OF NOSEBLEED: CPT | Mod: RT

## 2023-10-08 PROCEDURE — 250N000009 HC RX 250: Performed by: EMERGENCY MEDICINE

## 2023-10-08 PROCEDURE — 99284 EMERGENCY DEPT VISIT MOD MDM: CPT | Mod: 25

## 2023-10-08 RX ORDER — OXYMETAZOLINE HYDROCHLORIDE 0.05 G/100ML
2 SPRAY NASAL ONCE
Status: COMPLETED | OUTPATIENT
Start: 2023-10-08 | End: 2023-10-08

## 2023-10-08 RX ORDER — TRANEXAMIC ACID 100 MG/ML
INJECTION, SOLUTION INTRAVENOUS ONCE
Status: COMPLETED | OUTPATIENT
Start: 2023-10-08 | End: 2023-10-08

## 2023-10-08 RX ADMIN — OXYMETAZOLINE HYDROCHLORIDE 2 SPRAY: 0.05 SPRAY NASAL at 08:37

## 2023-10-08 ASSESSMENT — ENCOUNTER SYMPTOMS
VOMITING: 0
SORE THROAT: 0
ABDOMINAL PAIN: 0
HEADACHES: 1

## 2023-10-08 NOTE — ED TRIAGE NOTES
Pt c/o nose and mouth bleeding that started last night around 10pm. Pt on ASA everyday. Not on any blood thinner. No c/o chest pain or shortness of breath. No active bleeding noted. Pt showed her tongue and noted red color on her tongue.      Triage Assessment       Row Name 10/08/23 0706       Triage Assessment (Adult)    Airway WDL WDL       Respiratory WDL    Respiratory WDL WDL       Skin Circulation/Temperature WDL    Skin Circulation/Temperature WDL WDL       Cardiac WDL    Cardiac WDL WDL       Peripheral/Neurovascular WDL    Peripheral Neurovascular WDL WDL       Cognitive/Neuro/Behavioral WDL    Cognitive/Neuro/Behavioral WDL WDL

## 2023-10-08 NOTE — ED PROVIDER NOTES
EMERGENCY DEPARTMENT ENCOUNTER      NAME: Kulwant Amin  AGE: 53 year old female  YOB: 1970  MRN: [id number]  EVALUATION DATE & TIME: No admission date for patient encounter.    PCP: Adrien Cardoza    ED PROVIDER: Christy Soto M.D.      CHIEF COMPLAINT     Chief Complaint   Patient presents with    Epistaxis    Mouth bleeding         FINAL IMPRESSION:     1. Epistaxis          MEDICAL DECISION MAKING:       Pertinent Labs & Imaging studies reviewed. (See chart for details)    53 year old female presents to the Emergency Department for evaluation of epistaxis.     History  Supplemental history from daughter  External Record(s) review as documented below    Exam  Nontoxic cooperative and pleasant.  No active bleeding.  Does have dried blood in the mouth.  Stigmata of recent bleeding from the right nares.  No septal hematoma.  There is no active bleeding from the mouth.  No gingival bleeding.  The uvula is midline.  Cardiopulmonary regular rhythm.  Skin without petechia or purpura.    Differential Diagnosis include but not limited to anterior epistaxis, posterior epistasis.  Coagulopathy, mild trauma, malignancy, among others.      Vital Signs: reviewed  EKG: none  Imaging: none  Home meds: reviewed  ED meds: afrin  Fluids: oral  Labs:  None  On 10/1/2023 hemoglobin 11.6    Clinical Impression and Decision Making    53-year-old female presents here with daughter.  She experienced epistasis on the right nares yesterday.  There was no trauma and today noticed that the blood was going down to the posterior pharynx and through the mouth.  She takes a daily aspirin.  Denies any other episodes of easy bleeding gingival bleeding melena hematemesis or easy bruising.    Well appearing on exam on arrival to the emergency department day epistasis has resolved.  No septal hematoma.  No evidence of oral trauma or masses.  Afrin placed on the naris.  Observe.    Review external records hemoglobin 11.6 on 10/1/2093 she  denies any dizziness bleeding started yesterday is not copious amount of blood.    Afrin placed.  No active bleeding noted from the nares or the mouth.  Discussed with patient daughters humidified air follow primary care doctor using nasal clip and if unable to stop it to return and possible referral to ENT.  They felt comfortable with this plan discharge ambulatory stable condition.    In addition to the work out documented, I considered the following work up doing a hemoglobin and coagulation studies.  No evidence of spontaneous bleeding no melena or gross blood hemoglobin 7 days ago was 11.6.           Medical Decision Making    History:  Supplemental history from: Documented in chart, if applicable  External Record(s) reviewed: Documented in chart, if applicable.    Work Up:  Chart documentation includes differential considered and any EKGs or imaging independently interpreted by provider, where specified.  In additional to work up documented, I considered the following work up: Documented in chart, if applicable.    External consultation:  Discussion of management with another provider: Documented in chart, if applicable    Complicating factors:  Care impacted by chronic illness: Diabetes, Heart Disease, Hyperlipidemia, and Hypertension  Care affected by social determinants of health: N/A    Disposition considerations: Discharge. I recommended the patient continue their current prescription strength medication(s): insulin. I considered admission, but discharged patient after significant clinical improvement.          Review of External Records  Hospital/Clinic: [facility]  Date: 10/5/23  DM II, high blood pressure, hypertension, hyperlipidemia, GERD, high cholesterol, COPD, pericarditis, CAD    External Consultation        ED COURSE     7:14 AM I met with the patient, obtained history, performed physical exam, and discussed ED plan.  7:45 AM I rechecked on the patient and updated them on lab results and  the plan.   8:33 AM I rechecked on the patient and updated them on lab results and the plan. I recommended continuing her insulin. We discussed the plan for discharge and the patient is agreeable. Reviewed supportive cares, symptomatic treatment, outpatient follow up, and reasons to return to the Emergency Department. Patient to be discharged by ED RN.     At the conclusion of the encounter I discussed the results of all of the tests and the disposition. The questions were answered. The patient and daughter acknowledged understanding and was agreeable with the care plan.         MEDICATIONS GIVEN IN THE EMERGENCY:     Medications   oxymetazoline (AFRIN) 0.05 % spray 2 spray (2 sprays Nasal $Given by Other Clinician 10/8/23 0837)   tranexamic acid (CYKLOKAPRON) TOPICAL (injection used topically) ( Topical Not Given 10/8/23 0837)       NEW PRESCRIPTIONS STARTED AT TODAY'S ER VISIT     Discharge Medication List as of 10/8/2023  8:39 AM             =================================================================    HPI     Patient information was obtained from: patient, daughter - [facility]    Use of : N/A        Kulwant PAEZ Eloisa is a 53 year old female who presents by via walk-in.    Per daughter,  Yesterday 10/7/23, the patient started having blood come out her right nostril, go through the back of her throat, and out her mouth. It stopped but started again this morning. Denies taking blood thinners but takes Aspirin. This has not happened before. She only complains of headache.    Denies fall/trauma, chest pain, eye pain, nose pain, throat pain, ear pain, rash, bruising, vomiting blood, or any other complaints at this time.      REVIEW OF SYSTEMS   Review of Systems   HENT:  Positive for nosebleeds. Negative for ear pain and sore throat.    Cardiovascular:  Negative for chest pain.   Gastrointestinal:  Negative for abdominal pain and vomiting (Blood).   Skin:  Negative for rash.   Neurological:  Positive for  headaches.        PAST MEDICAL HISTORY:     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    Microcytic anemia 11/17/2019    S/P coronary artery stent placement 02/02/2018    TB lung, latent     9 mos INH       PAST SURGICAL HISTORY:     Past Surgical History:   Procedure Laterality Date    CORONARY STENT PLACEMENT  2018    CV CORONARY ANGIOGRAM N/A 1/25/2018    Procedure: Coronary Angiogram;  Surgeon: Angelo Serrano MD;  Location: [facility] Cath Lab;  Service:     CV CORONARY ANGIOGRAM N/A 5/5/2022    Procedure: Coronary Angiogram;  Surgeon: Irwin Cano MD;  Location: [facility] CATH LAB CV    CV CORONARY ANGIOGRAM  5/5/2022    Procedure: ;  Surgeon: Irwin Cano MD;  Location: [facility] LAB CV    CT ESOPHAGOGASTRODUODENOSCOPY TRANSORAL DIAGNOSTIC N/A 12/10/2018    Procedure: ESOPHAGOGASTRODUODENOSCOPY (EGD);  Surgeon: Eddie Renteria MD;  Location: [facility];  Service: Gastroenterology    CT ESOPHAGOGASTRODUODENOSCOPY TRANSORAL DIAGNOSTIC N/A 12/3/2020    Procedure: ESOPHAGOGASTRODUODENOSCOPY (EGD) with biospies ;  Surgeon: Avi Crow MD;  Location: [facility];  Service: Gastroenterology    [facility] COLONOSCOPY W/WO BRUSH/WASH N/A 12/10/2018    Procedure: COLONOSCOPY with polypectomy using biopsy forceps;  Surgeon: Eddei Renteria MD;  Location: [facility];  Service: Gastroenterology         CURRENT MEDICATIONS:   acetaminophen (TYLENOL) 500 MG tablet  albuterol (PROAIR  HFA/PROVENTIL HFA/VENTOLIN HFA) 108 (90 Base) MCG/ACT inhaler  albuterol (PROVENTIL) (2.5 MG/3ML) 0.083% neb solution  amitriptyline (ELAVIL) 50 MG tablet  ASPIRIN LOW DOSE 81 MG EC tablet  atorvastatin (LIPITOR) 80 MG tablet  B-D U/F 31G X 8 MM insulin pen needle  calcium carbonate (TUMS) 500 MG chewable tablet  colchicine (COLCRYS) 0.6 MG tablet  Continuous Blood Gluc Sensor (FREESTYLE MONICA 2 SENSOR) MISC  cyclobenzaprine (FLEXERIL) 5 MG tablet  dextromethorphan (DELSYM) 30 MG/5ML liquid  diclofenac (VOLTAREN) 1 % topical gel  docusate sodium (COLACE) 100 MG capsule  dupilumab (DUPIXENT) 300 MG/2ML prefilled pen  FLUoxetine (PROZAC) 10 MG capsule  fluticasone-vilanterol (BREO ELLIPTA) 200-25 MCG/ACT inhaler  gabapentin (NEURONTIN) 300 MG capsule  hydrochlorothiazide (MICROZIDE) 12.5 MG capsule  hydrocortisone (CORTAID) 1 % external cream  ibuprofen (ADVIL/MOTRIN) 600 MG tablet  insulin aspart (NOVOLOG PEN) 100 UNIT/ML pen  insulin glargine (LANTUS PEN) 100 UNIT/ML pen  insulin pen needle (32G X 4 MM) 32G X 4 MM miscellaneous  ipratropium - albuterol 0.5 mg/2.5 mg/3 mL (DUONEB) 0.5-2.5 (3) MG/3ML neb solution  lidocaine (XYLOCAINE) 5 % external ointment  loratadine (ALLERGY RELIEF) 10 MG tablet  meclizine (ANTIVERT) 12.5 MG tablet  metFORMIN (GLUCOPHAGE XR) 500 MG 24 hr tablet  metoprolol tartrate (LOPRESSOR) 25 MG tablet  montelukast (SINGULAIR) 10 MG tablet  nystatin (MYCOSTATIN) 160911 UNIT/ML suspension  omeprazole (PRILOSEC) 40 MG DR capsule  Polyethylene Glycol 3350 (PEG 3350) 17 GM/SCOOP POWD  predniSONE (DELTASONE) 20 MG tablet  sucralfate (CARAFATE) 1 GM/10ML suspension  tiotropium (SPIRIVA RESPIMAT) 2.5 MCG/ACT inhaler  topiramate (TOPAMAX) 25 MG tablet         ALLERGIES:   No Known Allergies    FAMILY HISTORY:     Family History   Problem Relation Age of Onset    Other - See Comments Mother          of an intestinal problem    Ulcers Father          of gastritis    Breast Cancer No family hx of         SOCIAL HISTORY:     Social History     Socioeconomic History    Marital status:    Tobacco Use    Smoking status: Former     Packs/day: 1.00     Years: 30.00     Pack years: 30.00     Types: Cigarettes     Quit date: 2003     Years since quittin.7     Passive exposure: Never    Smokeless tobacco: Never    Tobacco comments:     No passive exposure   Vaping Use    Vaping Use: Never used   Substance and Sexual Activity    Alcohol use: No    Drug use: No    Sexual activity: Yes     Partners: Male   Social History Narrative    2017 The patient lives with her daughter-in-law (who is present), , son, and 2 grandchildren (total of 6 people). Imm[place].     Social Determinants of Health     Financial Resource Strain: Low Risk  (10/5/2023)    Financial Resource Strain     Within the past 12 months, have you or your family members you live with been unable to get utilities (heat, electricity) when it was really needed?: No   Food Insecurity: Low Risk  (10/5/2023)    Food Insecurity     Within the past 12 months, did you worry that your food would run out before you got money to buy more?: No     Within the past 12 months, did the food you bought just not last and you didn t have money to get more?: No   Transportation Needs: Low Risk  (10/5/2023)    Transportation Needs     Within the past 12 months, has lack of transportation kept you from medical appointments, getting your medicines, non-medical meetings or appointments, work, or from getting things that you need?: No   Housing Stability: Low Risk  (10/5/2023)    Housing Stability     Do you have housing? : Yes     Are you worried about losing your housing?: No       VITALS:   /85   Pulse 83   Temp 98.5  F (36.9  C) (Oral)   Resp 20   Wt 55.3 kg (122 lb)   SpO2 100%   BMI 25.50 kg/m      PHYSICAL EXAM     Physical Exam  Vitals and nursing note reviewed. Exam conducted with a chaperone present.   Neurological:      Mental Status:  She is alert.         Physical Exam   Constitutional: Well-appearing. No distress.    Head: Atraumatic.     Nose: Nose normal.  No active bleeding.  Stigmata of recent bleeding on the right nares.  No septal hematoma.  No posterior bleeding.  Mouth/Throat: Oropharynx is clear and moist.     Eyes: EOM are normal. Pupils are equal, round, and reactive to light.  Uvula is midline.  No masses.  No trauma.  No active bleeding from the mouth.    Ears: Bilateral pearly white tympanic membranes.    Neck: Normal range of motion. Neck supple.     Cardiovascular: Normal rate, regular rhythm and normal heart sounds.      Pulmonary/Chest: Normal effort  and breath sounds normal.     Abdominal: soft nontender.    Musculoskeletal: Normal range of motion.     Neurological: Moves upper and lower extremities equally.    Lymphatics: no edema    : NA    Skin: Skin is warm and dry.     Psychiatric: Normal mood and affect. Behavior is normal.       LAB:     All pertinent labs reviewed and interpreted.  Labs Ordered and Resulted from Time of ED Arrival to Time of ED Departure - No data to display     RADIOLOGY:     Reviewed all pertinent imaging. Please see official radiology report.  No orders to display        EKG:       I have independently reviewed and interpreted the EKG(s) documented above.      PROCEDURES:       PROCEDURE: Epistaxis Management   INDICATIONS: Failure of epistaxis control with non-invasive management techniques.   PROCEDURE PROVIDER: Dr Christy Soto   SITE: Stigmata of recent bleeding from did not right nares no active bleeding room evaluation.   Afrin    NOTE: No active bleeding.  Afrin placed.  Patient observed.  No active bleeding from the nose or mouth.     COMPLICATIONS: Patient tolerated procedure well, without complication         I, Cal Velez, am serving as a scribe to document services personally performed by Dr. Soto based on my observation and the provider's statements to me. Christy GILLETTE MD  attest that Cal Velez is acting in a scribe capacity, has observed my performance of the services and has documented them in accordance with my direction.    Christy Soto M.D.  Emergency Medicine  [facility] EMERGENCY DEPARTMENT  [address] [place] [id number]  [phone]  Dept: [phone]       Christy Soto MD  10/08/23 0980

## 2023-10-08 NOTE — DISCHARGE INSTRUCTIONS
Read and follow the discharge instructions.    Use a humidifier at home.    If the bleeding returns go ahead and apply the nasal clip.    Continue your current medications.    Follow-up with your primary care doctor.    You may follow-up with the ear nose and throat doctor.    Return for worsening symptoms or any other concerns

## 2023-10-09 DIAGNOSIS — E11.9 TYPE 2 DIABETES MELLITUS TREATED WITH INSULIN (H): ICD-10-CM

## 2023-10-09 DIAGNOSIS — Z79.4 TYPE 2 DIABETES MELLITUS TREATED WITH INSULIN (H): ICD-10-CM

## 2023-10-10 RX ORDER — ATORVASTATIN CALCIUM 80 MG/1
TABLET, FILM COATED ORAL
Qty: 90 TABLET | Refills: 0 | Status: SHIPPED | OUTPATIENT
Start: 2023-10-10 | End: 2023-12-14

## 2023-10-12 ENCOUNTER — ALLIED HEALTH/NURSE VISIT (OUTPATIENT)
Dept: ALLERGY | Facility: CLINIC | Age: 53
End: 2023-10-12
Payer: COMMERCIAL

## 2023-10-12 DIAGNOSIS — J45.50 SEVERE PERSISTENT ASTHMA WITHOUT COMPLICATION (H): Primary | ICD-10-CM

## 2023-10-12 PROCEDURE — 96372 THER/PROPH/DIAG INJ SC/IM: CPT | Performed by: ALLERGY & IMMUNOLOGY

## 2023-10-12 NOTE — PROGRESS NOTES
Clinic Administered Medication Documentation      Injectable Medication Documentation    Is there an active order (written within the past 365 days, with administrations remaining, not ) in the chart? Yes.     Patient was given  Te[place]sp[place] . Prior to medication administration, verified patient's identity using patient s name and date of birth. Please see MAR and medication order for additional information. Patient instructed to remain in clinic for 15 minutes and report any adverse reaction to staff immediately.    Vial/Syringe: Syringe  Was this medication supplied by the patient? No  Is this a medication the patient will need to receive again? Yes. Verified that the patient has refills remaining in their prescription.

## 2023-10-17 DIAGNOSIS — Z53.9 DIAGNOSIS NOT YET DEFINED: Primary | ICD-10-CM

## 2023-10-17 PROCEDURE — G0179 MD RECERTIFICATION HHA PT: HCPCS | Performed by: FAMILY MEDICINE

## 2023-10-25 ENCOUNTER — OFFICE VISIT (OUTPATIENT)
Dept: PULMONOLOGY | Facility: CLINIC | Age: 53
End: 2023-10-25
Payer: COMMERCIAL

## 2023-10-25 VITALS
HEART RATE: 94 BPM | BODY MASS INDEX: 25.5 KG/M2 | WEIGHT: 122 LBS | DIASTOLIC BLOOD PRESSURE: 82 MMHG | OXYGEN SATURATION: 97 % | SYSTOLIC BLOOD PRESSURE: 130 MMHG

## 2023-10-25 DIAGNOSIS — J82.83 EOSINOPHILIC ASTHMA: Primary | ICD-10-CM

## 2023-10-25 DIAGNOSIS — J45.50 SEVERE PERSISTENT ASTHMA, UNSPECIFIED WHETHER COMPLICATED (H): ICD-10-CM

## 2023-10-25 PROCEDURE — 99214 OFFICE O/P EST MOD 30 MIN: CPT | Performed by: INTERNAL MEDICINE

## 2023-10-25 RX ORDER — ALBUTEROL SULFATE 90 UG/1
2 AEROSOL, METERED RESPIRATORY (INHALATION) EVERY 4 HOURS PRN
Qty: 18 G | Refills: 11 | Status: SHIPPED | OUTPATIENT
Start: 2023-10-25

## 2023-10-25 RX ORDER — ALBUTEROL SULFATE 0.83 MG/ML
2.5 SOLUTION RESPIRATORY (INHALATION) EVERY 6 HOURS PRN
Qty: 240 ML | Refills: 11 | Status: SHIPPED | OUTPATIENT
Start: 2023-10-25 | End: 2024-03-08

## 2023-10-25 RX ORDER — BENZONATATE 100 MG/1
100 CAPSULE ORAL 2 TIMES DAILY PRN
Qty: 100 CAPSULE | Refills: 1 | Status: SHIPPED | OUTPATIENT
Start: 2023-10-25 | End: 2024-01-22

## 2023-10-25 ASSESSMENT — ASTHMA QUESTIONNAIRES
ACT_TOTALSCORE: 5
QUESTION_5 LAST FOUR WEEKS HOW WOULD YOU RATE YOUR ASTHMA CONTROL: NOT CONTROLLED AT ALL
ACT_TOTALSCORE: 5
QUESTION_4 LAST FOUR WEEKS HOW OFTEN HAVE YOU USED YOUR RESCUE INHALER OR NEBULIZER MEDICATION (SUCH AS ALBUTEROL): THREE OR MORE TIMES PER DAY
QUESTION_3 LAST FOUR WEEKS HOW OFTEN DID YOUR ASTHMA SYMPTOMS (WHEEZING, COUGHING, SHORTNESS OF BREATH, CHEST TIGHTNESS OR PAIN) WAKE YOU UP AT NIGHT OR EARLIER THAN USUAL IN THE MORNING: FOUR OR MORE NIGHTS A WEEK
QUESTION_1 LAST FOUR WEEKS HOW MUCH OF THE TIME DID YOUR ASTHMA KEEP YOU FROM GETTING AS MUCH DONE AT WORK, SCHOOL OR AT HOME: ALL OF THE TIME
QUESTION_2 LAST FOUR WEEKS HOW OFTEN HAVE YOU HAD SHORTNESS OF BREATH: MORE THAN ONCE A DAY

## 2023-10-25 NOTE — PROGRESS NOTES
Pulmonary Outpatient Clinic Follow Up  07/06/23      Assessment/Plan:    53 year old woman with history of organic fuel exposure in the home was previously had nondiagnostic PFTs.  She was previously evaluated by Dr. Marie and i is being treated with monoclonal antibodies for her hypereosinophilia which is likely driving her asthma.      Severe persistent asthma: Follows in clinic for this and has baseline symptoms regularly.  Continue Breo Ellipta 1 puff daily.  She knows to gargle after using this.   Continue Spiriva.  Continue to use 3 times daily albuterol nebs.  Continue Singulair daily.  As needed albuterol rescue.  I have prescribed her a 1 month course of as needed Tessalon Perles for nighttime symptoms.  Tezpire once a month prescribed through the allergy clinic.  Questionable eosinophilic esophagitis:   Follows with GI for this.  Omeprazole 40 mg a day.  As needed Tums.  Follow-up: 4 months with me.    Tamra Duong MD  Pulmonary and Critical Care  (p) [phone]      Subjective:   Patient ID: Ms. Amin is a 53 year old female with a past medical history significant for anxiety, arthritis, questionable asthma versus COPD, diabetes, hypertension and a prior history of SVT presents with her daughter in law for a follow-up visit for her pulmonary complaints.   As per her baseline, unfortunately she continues to have coughing, shortness of breath and some wheezing particularly when she is exposed to cold air.  She endorses chest pain and chest tightness.  She has no fevers, chills, night sweats or change in her weight.  She states that she continues to cough at night and is often wakes her up and exacerbates her chest pain.  She was seen by Dr. Marie in August of this year and switch from Dupixent to Tezpire for brittle asthma.           7/24/2023     9:00 AM 8/24/2023    10:00 AM 10/25/2023     1:00 PM   ACT Total Scores   ACT TOTAL SCORE (Goal Greater than or Equal to 20) 7 9 5   In the past 12 months, how  many times did you visit the emergency room for your asthma without being admitted to the hospital? 0 0 0   In the past 12 months, how many times were you hospitalized overnight because of your asthma? 0 0 0       Current Outpatient Medications   Medication Sig Dispense Refill    acetaminophen (TYLENOL) 500 MG tablet TAKE 1 PILL BY MOUTH EVERY 6 HOURS AS NEEDED FOR PAIN 100 tablet 10    albuterol (PROAIR HFA/PROVENTIL HFA/VENTOLIN HFA) 108 (90 Base) MCG/ACT inhaler Inhale 2 puffs into the lungs every 4 hours as needed for shortness of breath 18 g 11    albuterol (PROVENTIL) (2.5 MG/3ML) 0.083% neb solution Take 1 vial (2.5 mg) by nebulization every 6 hours as needed for shortness of breath, wheezing or cough 240 mL 11    amitriptyline (ELAVIL) 50 MG tablet Take 1 tablet (50 mg) by mouth At Bedtime 90 tablet 1    ASPIRIN LOW DOSE 81 MG EC tablet TAKE 1 TABLET (81 MG TOTAL) BY MOUTH DAILY. 90 tablet 3    atorvastatin (LIPITOR) 80 MG tablet TAKE 1 TABLET (80 MG TOTAL) BY MOUTH AT BEDTIME FOR CHOLESTEROL 90 tablet 0    B-D U/F 31G X 8 MM insulin pen needle USE ONE PEN NEEDLE DAILY AS NEEDED FOR  each 1    benzonatate (TESSALON) 100 MG capsule Take 1 capsule (100 mg) by mouth 2 times daily as needed for cough 100 capsule 1    calcium carbonate (TUMS) 500 MG chewable tablet Take 1 tablet (500 mg) by mouth 2 times daily as needed for heartburn 30 tablet 0    colchicine (COLCRYS) 0.6 MG tablet Take 1 tablet (0.6 mg) by mouth daily 90 tablet 3    Continuous Blood Gluc Sensor (FREESTYLE MONICA 2 SENSOR) [facility] [place] 1 EACH EVERY 14 DAYS USE 1 SENSOR EVERY 14 DAYS. USE TO READ BLOOD SUGARS PER 'S INSTRUCTIONS. 2 each 11    cyclobenzaprine (FLEXERIL) 5 MG tablet Take 5 mg by mouth 3 times daily as needed for muscle spasms      dextromethorphan (DELSYM) 30 MG/5ML liquid Take 10 mLs (60 mg) by mouth 2 times daily as needed for cough 178 mL 0    diclofenac (VOLTAREN) 1 % topical gel Apply 4 g topically 4 times daily 350  g 3    docusate sodium (COLACE) 100 MG capsule Take 1 capsule (100 mg) by mouth 2 times daily as needed for constipation 90 capsule 3    FLUoxetine (PROZAC) 10 MG capsule TAKE 1 CAPSULE (10 MG) BY MOUTH DAILY 90 capsule 2    fluticasone-vilanterol (BREO ELLIPTA) 200-25 MCG/ACT inhaler Inhale 1 puff into the lungs daily 60 each 11    gabapentin (NEURONTIN) 300 MG capsule Take 2 capsules (600 mg) by mouth 3 times daily 540 capsule 3    hydrochlorothiazide (MICROZIDE) 12.5 MG capsule TAKE 1 CAPSULE (12.5 MG TOTAL) BY MOUTH DAILY FOR BLOOD PRESSURE 90 capsule 2    hydrocortisone (CORTAID) 1 % external cream Apply topically 2 times daily 60 g 0    ibuprofen (ADVIL/MOTRIN) 600 MG tablet Take 600 mg by mouth every 6 hours as needed for moderate pain      insulin aspart (NOVOLOG PEN) 100 UNIT/ML pen Inject 10 Units Subcutaneous 2 times daily (with meals) 15 mL 3    insulin glargine (LANTUS PEN) 100 UNIT/ML pen Inject 40 Units Subcutaneous every morning 45 mL 3    insulin pen needle (32G X 4 MM) 32G X 4 MM miscellaneous Use one pen needles daily or as directed. 200 each 11    ipratropium - albuterol 0.5 mg/2.5 mg/3 mL (DUONEB) 0.5-2.5 (3) MG/3ML neb solution Take 1 vial (3 mLs) by nebulization every 6 hours as needed for shortness of breath / dyspnea or wheezing 360 mL 11    lidocaine (XYLOCAINE) 5 % external ointment Apply topically 3 times daily as needed Small amount (finger tip amount) to chest tid prn pain 35.44 g 0    meclizine (ANTIVERT) 12.5 MG tablet TAKE 2 TABLETS (25 MG) BY MOUTH 3 TIMES DAILY AS NEEDED FOR DIZZINESS 90 tablet 0    metFORMIN (GLUCOPHAGE XR) 500 MG 24 hr tablet Take 1 tablet (500 mg) by mouth 2 times daily (with meals) 180 tablet 3    metoprolol tartrate (LOPRESSOR) 25 MG tablet TAKE 1 TABLET (25 MG TOTAL) BY MOUTH 2 (TWO) TIMES A DAY FOR BLOOD PRESSURE 180 tablet 3    montelukast (SINGULAIR) 10 MG tablet Take 1 tablet (10 mg) by mouth At Bedtime 30 tablet 11    nystatin (MYCOSTATIN) 914788 UNIT/ML  suspension Take 1,000,000 mLs by mouth 4 times daily      omeprazole (PRILOSEC) 40 MG DR capsule Take 1 capsule (40 mg) by mouth daily 90 capsule 3    Polyethylene Glycol 3350 (PEG 3350) 17 GM/SCOOP POWD MIX 17 GRAMS WITH LIQUID AND DRINK ONCE DAILY FOR CONSTIPATION 1530 g 3    sucralfate (CARAFATE) 1 GM/10ML suspension Take 10 mLs (1 g) by mouth 4 times daily 3600 mL 1    tiotropium (SPIRIVA RESPIMAT) 2.5 MCG/ACT inhaler Inhale 2 puffs into the lungs daily 4 g 11    topiramate (TOPAMAX) 25 MG tablet Take 1 tablet at night. Can increase to 2 tabs/night if needed. 180 tablet 1    dupilumab (DUPIXENT) 300 MG/2ML prefilled pen Inject 2 mLs (300 mg) Subcutaneous every 14 days (Patient not taking: Reported on 10/25/2023) 4 mL 5     Social history:  Lives in a house built in 1963; no concern for mold in the house.  7 People live in the house including 2 children.  There are no pets in the house.  She is a never smoker and there is no second hand exposure to smoke.  She does not drink alcoholic beverages and denies indulging in recreational drugs.    Physical Exam   /82 (BP Location: Left arm, Patient Position: Chair, Cuff Size: Adult Small)   Pulse 94   Wt 55.3 kg (122 lb)   SpO2 97%   BMI 25.50 kg/m      Physical Exam  Constitutional:       General: She is not in acute distress.     Appearance: She is not ill-appearing or diaphoretic.   Cardiovascular:      Rate and Rhythm: Normal rate and regular rhythm.      Pulses: Normal pulses.      Heart sounds: Normal heart sounds.   Pulmonary:      Effort: Pulmonary effort is normal. No respiratory distress.      Breath sounds: Wheezing present. No rhonchi.   Musculoskeletal:      Right lower leg: No edema.      Left lower leg: No edema.   Skin:     General: Skin is warm and dry.      Findings: No rash.   Neurological:      Mental Status: She is alert.   Psychiatric:         Behavior: Behavior normal.            Latest Reference Range & Units 02/07/22 15:47   Neutrophil  Cytoplasmic Antibody <1:10  <1:10   Neutrophil Cytoplasmic Antibody Pattern  The ANCA IFA is <1:10.  No further testing will be performed.      Latest Reference Range & Units 02/07/22 15:47   Schistosoma Ab IgG Negative  Negative [1]   Strongyloides Bere IgG <=0.9 IV 0.4 [2]      Latest Reference Range & Units 02/07/22 15:47   Immunoglobulin E <=214 kU/L 74 [1]   Allergen Fungimold A Fumigatus IgE <=0.34 kU/L <0.10 [2]   Aspergillus Fumagatis 1 Antibody None Detected  None Detected [3]   Aspergillus Fumagatis 6 Antibody None Detected  None Detected [4]   Allergen, Interp, Immunocap Score IgE  See Note [5]     XR CHEST 2 VIEWS, DATE/TIME: 3/27/2023 8:32 AM  INDICATION: chest pain  COMPARISON: 02/05/2023                                                              IMPRESSION: No pneumothorax or pleural effusion. No focal consolidation. Cardiac silhouette within normal limits. Mildly atherosclerotic aorta.    CT CHEST W/O CONTRAST, DATE/TIME: 7/11/2022 11:28 PM  INDICATION: Chest pain.  COMPARISON: 5/4/2022.  TECHNIQUE: CT chest without IV contrast. Multiplanar reformats were obtained. Dose reduction techniques were used.  CONTRAST: None.  FINDINGS:   LUNGS AND PLEURA: There is atelectasis and scarring at the lung bases. Scarring at the lung apices. Mild dependent atelectasis bilaterally. Subpleural calcified granuloma or calcified plaque in the right lower lobe laterally. Stable 2 mm nodule in the   right upper lobe posteriorly. Stable 3 mm nodule in the lingula laterally. Tiny nodule along the left major fissure laterally is stable. No pneumothorax or pleural effusion.  MEDIASTINUM/AXILLAE: No lymph node enlargement. Central airways are unremarkable. The heart size is normal.  CORONARY ARTERY CALCIFICATION: Previous intervention (stents or CABG).                                                             IMPRESSION:   1.  No acute abnormality. No cause of chest pain is evident.  2.  A few small lung nodules without  significant change.  Recommendations for one or multiple incidental lung nodules < 6mm :    Low risk patients: No routine follow-up.    High risk patients: Optional follow-up CT at 12 months; if unchanged, no further follow-up.    ECHO 5/4/2022  Interpretation Summary  Left ventricular size, wall motion and function are normal. The ejection  fraction is 55-60%.  There is borderline anterior, septal, and apical wall hypokinesis.  Normal right ventricle size and systolic function.  No hemodynamically significant valvular abnormalities on 2D or color flow  imaging.

## 2023-10-25 NOTE — PATIENT INSTRUCTIONS
I have given you a prescription for Tessalon Perles for your cough, use this once at night to help with coughing.  Wear a mask when you go out of the house since cold weather is one of your triggers.  I have refilled your albuterol inhaler and nebulizers.  Please receive your RSV vaccine at your primary care clinic.

## 2023-10-26 ENCOUNTER — OFFICE VISIT (OUTPATIENT)
Dept: NEUROLOGY | Facility: CLINIC | Age: 53
End: 2023-10-26
Payer: COMMERCIAL

## 2023-10-26 VITALS
HEART RATE: 100 BPM | DIASTOLIC BLOOD PRESSURE: 80 MMHG | WEIGHT: 122 LBS | BODY MASS INDEX: 25.5 KG/M2 | RESPIRATION RATE: 18 BRPM | SYSTOLIC BLOOD PRESSURE: 111 MMHG

## 2023-10-26 DIAGNOSIS — R42 LIGHT HEADED: ICD-10-CM

## 2023-10-26 DIAGNOSIS — R42 VERTIGO: ICD-10-CM

## 2023-10-26 DIAGNOSIS — G43.719 INTRACTABLE CHRONIC MIGRAINE WITHOUT AURA AND WITHOUT STATUS MIGRAINOSUS: ICD-10-CM

## 2023-10-26 DIAGNOSIS — I63.50 CEREBROVASCULAR ACCIDENT OF RIGHT PONTINE STRUCTURE (H): Primary | ICD-10-CM

## 2023-10-26 DIAGNOSIS — R29.898 WEAKNESS OF BOTH LOWER EXTREMITIES: ICD-10-CM

## 2023-10-26 DIAGNOSIS — M25.562 LEFT KNEE PAIN, UNSPECIFIED CHRONICITY: ICD-10-CM

## 2023-10-26 DIAGNOSIS — G44.40 MEDICATION OVERUSE HEADACHE: ICD-10-CM

## 2023-10-26 DIAGNOSIS — R25.2 LEG CRAMPS: ICD-10-CM

## 2023-10-26 PROCEDURE — 99215 OFFICE O/P EST HI 40 MIN: CPT | Performed by: PSYCHIATRY & NEUROLOGY

## 2023-10-26 NOTE — PROGRESS NOTES
NEUROLOGY OUTPATIENT PROGRESS NOTE  Oct 26, 2023     CHIEF COMPLAINT/REASON FOR VISIT/REASON FOR CONSULT  Patient presents with:  Follow Up    REASON FOR CONSULTATION- Multiple issues    REFERRAL SOURCE   Referred Self  CC  Referred Self    HISTORY OF PRESENT ILLNESS  Kulwant Amin is a 53 year old female seen today for evaluation of multiple issues.    In 2021 she was found to have a right pontine stroke.  Presenting symptoms of vertigo.  No clear etiology for the stroke was identified it was thought to be lacunar.  She does have a history of hypertension, hyperlipidemia, diabetes.  She was supposed to be on Plavix though currently is only on aspirin.  No recurrence of stroke symptoms.  She is presented to the ER since then with vertigo symptoms and her imaging studies have been negative    1.  She reports that the vertigo is there all the time.  She is using meclizine which she finds some benefit with. She has done vestibular rehab without any improvement.    2.  Further complains of headaches.  These are around-the-clock.  He is taking Tylenol every 8 hours to get rid of the headaches.  Headaches are more frontal.  They can be throbbing in nature with photophobia and phonophobia.  She does continue visual auras as well.  She is on amitriptyline at nighttime.  She feels that it might be helping with the headaches.  Does not get good sleep due to COPD.  Headaches started after her stroke.    3.  No other new strokelike symptoms.    4.  Does complain of some lightheadedness especially during the exam today.  Has been put on hydrochlorothiazide by her primary care provider.    5/24/23  Patient returns today  1.  No stroke symptoms.  Is tolerating her stroke medications  2.  Continues to have continuous headache 24/7.  Occasionally it will go away but then come back.  Is still overusing Tylenol.  Generally uses 1 tablet a day but can use up to 3 tablets a day.  Amitriptyline did help with the headaches and now  they are not getting as severe as compared to before.  3.  Still feels dizzy.  Drinking plenty of water.    8/10/23  Patient returns today  1.  No further strokelike symptoms.  Continues to complain of some dizziness.  2.  Headaches have improved with use of the amitriptyline.  She continues to have a very mild continuous headache all the time.  She is only using the Tylenol 1-2 times a week.  The more severe headaches are only 1-2 times a week as well.  Amitriptyline does help without side effects though she would like to try other medications  3.  Drinks plenty of water.  No history of kidney stones.    10/26/23  Patient returns to the  1.  No further strokelike symptoms.  2.  Continues to have headaches every day.  Some associated dizziness.  She did try the Topamax and that was thought to be causing muscle cramps and as a result she stopped it.  No benefit with the headaches.  3.  Muscle cramps in the legs are still present.  When asked where she has pain in her legs she describes all over with stretching her knee and she might have knee pain.  She also reports bilateral leg weakness.  Does have chronic back pain that has not really worsened.  Some neck stiffness as well.  No other new neurological symptoms.    Previous history is reviewed and this is unchanged.    PAST MEDICAL/SURGICAL HISTORY  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    Microcytic anemia 11/17/2019    S/P coronary artery stent  placement 2018    TB lung, latent     9 mos INH     Patient Active Problem List   Diagnosis    Pulmonary nodule, right    Environmental allergies    TB lung, latent    COPD (chronic obstructive pulmonary disease)/Asthma     Essential hypertension    Cataracts, bilateral    Corneal opacity    Irregular astigmatism    Anxiety    Chronic nonintractable headache, unspecified headache type    Coronary artery disease due to lipid rich plaque    Dizziness    Hyperlipidemia    Moderate major depression (H)    Moderate persistent asthma    Unspecified visual loss    GERD (gastroesophageal reflux disease)    Dental caries    H/O arterial ischemic stroke    Constipation    Dysphagia    Chronic abdominal pain    Insomnia    FLACO (obstructive sleep apnea)- mild (AHI 14)    Chest pain    Chest pain, unspecified type    Type 2 diabetes mellitus treated with insulin (H)   Significant for arthritis, diabetes, high blood pressure, hyperlipidemia, stroke    FAMILY HISTORY  Family History   Problem Relation Age of Onset    Other - See Comments Mother          of an intestinal problem    Ulcers Father          of gastritis    Breast Cancer No family hx of    Negative for stroke    SOCIAL HISTORY  Social History     Tobacco Use    Smoking status: Former     Packs/day: 1.00     Years: 30.00     Additional pack years: 0.00     Total pack years: 30.00     Types: Cigarettes     Quit date: 2003     Years since quittin.8     Passive exposure: Never    Smokeless tobacco: Never    Tobacco comments:     No passive exposure   Vaping Use    Vaping Use: Never used   Substance Use Topics    Alcohol use: No    Drug use: No       SYSTEMS REVIEW  Twelve-system ROS was done and other than the HPI this was negative except for back pain, joint pain, numbness and tingling, weakness/paralysis, difficulty walking, balance/coordination problems, dizziness, headaches, anxiety, cardiac/heart problems, respiratory problems, snoring  loudly.  No new symptoms/issues other than the HPI.    MEDICATIONS  acetaminophen (TYLENOL) 500 MG tablet, TAKE 1 PILL BY MOUTH EVERY 6 HOURS AS NEEDED FOR PAIN  albuterol (PROAIR HFA/PROVENTIL HFA/VENTOLIN HFA) 108 (90 Base) MCG/ACT inhaler, Inhale 2 puffs into the lungs every 4 hours as needed for shortness of breath  albuterol (PROVENTIL) (2.5 MG/3ML) 0.083% neb solution, Take 1 vial (2.5 mg) by nebulization every 6 hours as needed for shortness of breath, wheezing or cough  amitriptyline (ELAVIL) 50 MG tablet, Take 1 tablet (50 mg) by mouth At Bedtime  ASPIRIN LOW DOSE 81 MG EC tablet, TAKE 1 TABLET (81 MG TOTAL) BY MOUTH DAILY.  atorvastatin (LIPITOR) 80 MG tablet, TAKE 1 TABLET (80 MG TOTAL) BY MOUTH AT BEDTIME FOR CHOLESTEROL  B-D U/F 31G X 8 MM insulin pen needle, USE ONE PEN NEEDLE DAILY AS NEEDED FOR SSI  benzonatate (TESSALON) 100 MG capsule, Take 1 capsule (100 mg) by mouth 2 times daily as needed for cough  calcium carbonate (TUMS) 500 MG chewable tablet, Take 1 tablet (500 mg) by mouth 2 times daily as needed for heartburn  colchicine (COLCRYS) 0.6 MG tablet, Take 1 tablet (0.6 mg) by mouth daily  Continuous Blood Gluc Sensor (FREESTYLE MONICA 2 SENSOR) [facility], 1 EACH EVERY 14 DAYS USE 1 SENSOR EVERY 14 DAYS. USE TO READ BLOOD SUGARS PER 'S INSTRUCTIONS.  cyclobenzaprine (FLEXERIL) 5 MG tablet, Take 5 mg by mouth 3 times daily as needed for muscle spasms  dextromethorphan (DELSYM) 30 MG/5ML liquid, Take 10 mLs (60 mg) by mouth 2 times daily as needed for cough  diclofenac (VOLTAREN) 1 % topical gel, Apply 4 g topically 4 times daily  docusate sodium (COLACE) 100 MG capsule, Take 1 capsule (100 mg) by mouth 2 times daily as needed for constipation  dupilumab (DUPIXENT) 300 MG/2ML prefilled pen, Inject 2 mLs (300 mg) Subcutaneous every 14 days  FLUoxetine (PROZAC) 10 MG capsule, TAKE 1 CAPSULE (10 MG) BY MOUTH DAILY  fluticasone-vilanterol (BREO ELLIPTA) 200-25 MCG/ACT inhaler, Inhale 1 puff  into the lungs daily  gabapentin (NEURONTIN) 300 MG capsule, Take 2 capsules (600 mg) by mouth 3 times daily  hydrochlorothiazide (MICROZIDE) 12.5 MG capsule, TAKE 1 CAPSULE (12.5 MG TOTAL) BY MOUTH DAILY FOR BLOOD PRESSURE  hydrocortisone (CORTAID) 1 % external cream, Apply topically 2 times daily  ibuprofen (ADVIL/MOTRIN) 600 MG tablet, Take 600 mg by mouth every 6 hours as needed for moderate pain  insulin aspart (NOVOLOG PEN) 100 UNIT/ML pen, Inject 10 Units Subcutaneous 2 times daily (with meals)  insulin glargine (LANTUS PEN) 100 UNIT/ML pen, Inject 40 Units Subcutaneous every morning  insulin pen needle (32G X 4 MM) 32G X 4 MM miscellaneous, Use one pen needles daily or as directed.  ipratropium - albuterol 0.5 mg/2.5 mg/3 mL (DUONEB) 0.5-2.5 (3) MG/3ML neb solution, Take 1 vial (3 mLs) by nebulization every 6 hours as needed for shortness of breath / dyspnea or wheezing  lidocaine (XYLOCAINE) 5 % external ointment, Apply topically 3 times daily as needed Small amount (finger tip amount) to chest tid prn pain  meclizine (ANTIVERT) 12.5 MG tablet, TAKE 2 TABLETS (25 MG) BY MOUTH 3 TIMES DAILY AS NEEDED FOR DIZZINESS  metFORMIN (GLUCOPHAGE XR) 500 MG 24 hr tablet, Take 1 tablet (500 mg) by mouth 2 times daily (with meals)  metoprolol tartrate (LOPRESSOR) 25 MG tablet, TAKE 1 TABLET (25 MG TOTAL) BY MOUTH 2 (TWO) TIMES A DAY FOR BLOOD PRESSURE  montelukast (SINGULAIR) 10 MG tablet, Take 1 tablet (10 mg) by mouth At Bedtime  nystatin (MYCOSTATIN) 355224 UNIT/ML suspension, Take 1,000,000 mLs by mouth 4 times daily  omeprazole (PRILOSEC) 40 MG DR capsule, Take 1 capsule (40 mg) by mouth daily  Polyethylene Glycol 3350 (PEG 3350) 17 GM/SCOOP POWD, MIX 17 GRAMS WITH LIQUID AND DRINK ONCE DAILY FOR CONSTIPATION  sucralfate (CARAFATE) 1 GM/10ML suspension, Take 10 mLs (1 g) by mouth 4 times daily  tiotropium (SPIRIVA RESPIMAT) 2.5 MCG/ACT inhaler, Inhale 2 puffs into the lungs daily  topiramate (TOPAMAX) 25 MG tablet,  Take 1 tablet at night. Can increase to 2 tabs/night if needed.    tezepelumab-ekko (TEZSPIRE) injection 210 mg         PHYSICAL EXAMINATION  VITALS: /80   Pulse 100   Resp 18   Wt 55.3 kg (122 lb)   BMI 25.50 kg/m    GENERAL: Healthy appearing, alert, no acute distress, normal habitus.  CARDIOVASCULAR: Extremities warm and well perfused. Pulses present.   NEUROLOGICAL:  Patient is awake and oriented to self, place and time.  Attention span is normal.  Memory is grossly intact.  Language is fluent and follows commands appropriately.  Appropriate fund of knowledge. Cranial nerves 2-12 are intact. There is no pronator drift.  Motor exam shows  4/5 strength in all extremities.  Generalized weakness due to pain all over.  Tone is symmetric bilaterally in upper and lower extremities.  Previously reflexes are symmetric and 1+ in upper extremities and lower extremities. Sensory exam is grossly intact to light touch, pin prick and vibration.  Finger to nose and heel to shin is without dysmetria.  Romberg is negative.  Gait is normal and the patient is able to do tandem walk and walk on toes and heels with mild difficulty.  Orthostatic vitals    Exam shows some generalized weakness related to pain.    DIAGNOSTICS  MRI 2022  IMPRESSION:  1.  No acute intracranial abnormalities identified.  2.  Minor chronic small vessel ischemic change, otherwise unremarkable contrast-enhanced MRI of the brain.    MRA                                                               IMPRESSION:  1.  Normal MRA [place].      MRA neck  IMPRESSION:  1.  Normal neck MRA.    Cardiac event monitor    ECHo  Left ventricular size, wall motion and function are normal. The ejection  fraction is 55-60%.  There is borderline anterior, septal, and apical wall hypokinesis.  Normal right ventricle size and systolic function.  No hemodynamically significant valvular abnormalities on 2D or color flow  Imaging.      CTA 2021  HEAD CT:  1.  No  evidence of acute hemorrhage, mass effect, or acute vascular territorial infarction.  2.  Severe left sphenoid sinus disease.     HEAD CTA:   1.  No evidence of proximal large vessel occlusion or flow-limiting stenosis.  2.  Dorsally directed 2 mm contour irregularity arising from the distal left supraclinoid ICA may represent a tiny posterior communicating artery aneurysm.     NECK CTA:  1.  No hemodynamically significant stenosis based on NASCET criteria.  2.  Mild left V1 segment stenosis.  3.  No evidence for dissection.      MRI 2020  IMPRESSION:  MRI BRAIN:  1. No finding for acute infarct, hemorrhage, or mass.  2. There are a few very small foci of FLAIR hyperintensity within the cerebral white matter, nonspecific but compatible with small areas of gliosis and/or chronic myelin loss.     MRA [place]:  1. Congenital variation of the [place] without vascular cutoff, aneurysm, or flow-limiting stenosis.      MRI 2021  HEAD MRI:   1.  Tiny linear focus of diffusion restriction within the right dorsal diomedse suspicious for acute small vessel infarct. This is new compared to 12/07/2020.  2.  No hemorrhagic transformation or mass effect. No additional infarct identified.  3.  Mild presumed microvascular ischemic change within the cerebral white matter.     HEAD MRA:   1.  No aneurysm, high flow AVM or significant stenosis identified.  2.  Variant [place] anatomy as above.     NECK MRA:  1.  Evaluation degraded by suboptimal timing of the contrast bolus and significant venous contamination the gadolinium bolus MRA.  2.  No hemodynamically significant stenosis in the carotid circulation by NASCET criteria.  3.  Poor visualization of the left vertebral artery origin and proximal left V1 segment. This may be artifactual, but it is difficult to exclude high-grade stenosis in this region.      MRI 2021  IMPRESSION:  1.  No mass, hemorrhage, or recent infarct identified.  2.  Diffusion abnormality  involving the right ventral diomedes seen on 01/13/2021 is no longer identified. No definite residual signal abnormality in this location to confirm prior infarct. This may relate to small size and slice selection.  3.  Inflammatory changes of the paranasal sinuses including bilateral maxillary air-fluid levels. Correlate with clinical evidence for sinusitis.      RELEVANT LABS  Component      Latest Ref Rng & Units 11/30/2022 2/5/2023   WBC      4.0 - 11.0 10e3/uL  9.3   RBC Count      3.80 - 5.20 10e6/uL  4.59   Hemoglobin      11.7 - 15.7 g/dL  11.8   Hematocrit      35.0 - 47.0 %  38.1   MCV      78 - 100 fL  83   MCH      26.5 - 33.0 pg  25.7 (L)   MCHC      31.5 - 36.5 g/dL  31.0 (L)   RDW      10.0 - 15.0 %  15.1 (H)   Platelet Count      150 - 450 10e3/uL  228   % Neutrophils      %  69   % Lymphocytes      %  14   % Monocytes      %  6   % Eosinophils      %  11   % Basophils      %  0   % Immature Granulocytes      %  0   NRBCs per 100 WBC      <1 /100  0   Absolute Neutrophils      1.6 - 8.3 10e3/uL  6.4   Absolute Lymphocytes      0.8 - 5.3 10e3/uL  1.3   Absolute Monocytes      0.0 - 1.3 10e3/uL  0.6   Absolute Eosinophils      0.0 - 0.7 10e3/uL  1.0 (H)   Absolute Basophils      0.0 - 0.2 10e3/uL  0.0   Absolute Immature Granulocytes      <=0.4 10e3/uL  0.0   Absolute NRBCs      10e3/uL  0.0   Sodium      136 - 145 mmol/L  138   Potassium      3.4 - 5.3 mmol/L  4.2   Chloride      98 - 107 mmol/L  102   Carbon Dioxide (CO2)      22 - 29 mmol/L  25   Anion Gap      7 - 15 mmol/L  11   Urea Nitrogen      6.0 - 20.0 mg/dL  10.6   Creatinine      0.51 - 0.95 mg/dL  0.58   Calcium      8.6 - 10.0 mg/dL  9.5   Glucose      70 - 99 mg/dL  156 (H)   GFR Estimate      >60 mL/min/1.73m2  >90   Hemoglobin A1C      0.0 - 5.6 % 8.6 (H)      OUTSIDE RECORDS  Outside referral notes and chart notes were reviewed and pertinent information has been summarized (in addition to the HPI):- Dr. Multani notes reviewed      ER  notes above reviewed    Component      Latest Ref Rng 9/14/2023  9:21 AM 10/1/2023  9:42 AM   Sodium      135 - 145 mmol/L  136    Potassium      3.4 - 5.3 mmol/L  4.3    Chloride      98 - 107 mmol/L  102    Carbon Dioxide (CO2)      22 - 29 mmol/L  22    Anion Gap      7 - 15 mmol/L  12    Urea Nitrogen      6.0 - 20.0 mg/dL  9.2    Creatinine      0.51 - 0.95 mg/dL  0.53    GFR Estimate      >60 mL/min/1.73m2  >90    Calcium      8.6 - 10.0 mg/dL  9.1    Glucose      70 - 99 mg/dL  201 (H)    WBC      4.0 - 11.0 10e3/uL  7.2    RBC Count      3.80 - 5.20 10e6/uL  4.73    Hemoglobin      11.7 - 15.7 g/dL  11.6 (L)    Hematocrit      35.0 - 47.0 %  38.4    MCV      78 - 100 fL  81    MCH      26.5 - 33.0 pg  24.5 (L)    MCHC      31.5 - 36.5 g/dL  30.2 (L)    RDW      10.0 - 15.0 %  15.7 (H)    Platelet Count      150 - 450 10e3/uL  230    CRP Inflammation      <5.00 mg/L 3.20        Legend:  (H) High  (L) Low    IMPRESSION/REPORT/PLAN  Cerebrovascular accident of right pontine structure (H)  Medication overuse headache  Vertigo  Light headed  Intractable chronic migraine without aura and without status migrainosus  Bilateral leg weakness/muscle cramps versus knee pain    This is a 53 year old female history of right pontine stroke thought to be due to vascular risk factors.  Currently she is on aspirin for stroke prevention.  Further management of vascular risk factors per primary care.  No new stroke symptoms.    She does complain of vertigo and most likely this is related to her stroke.  Meclizine is currently helping and should continue.  She is tried vestibular rehab without any benefit.  Could be related to headaches also.  There might not be any further treatment options available for this.  No change.    In terms of her headaches these are most suggestive of medication overuse headaches since she is overusing Tylenol.  Encourage her not to use the Tylenol more than 2-3 times per week; reemphasized this  today.  With cutting down on the Tylenol there has been some improvement.  Amitriptyline at higher doses is also helping.  Topamax was not effective/caused side effects.  She is continues to have headaches every day.  We will try Emgality. Tylenol is sufficient for abortive therapy.  Her severe headaches do have migraine features and could be migraine headaches.  Amitriptyline was initially prescribed through primary care.    She does complain of some lightheadedness and orthostatic vitals were negative.  Most likely this is unrelated to the stroke.  Further management per primary care.  Encourage good hydration.  No change.  She is drinking plenty of water.    She does complain of new bilateral leg weakness/muscle cramps.  On exam she has generalized weakness related to her muscle pain.  We will check blood work/EMG to look for causes of myopathy.  We will also check MRI L-spine/C-spine to further evaluate her leg weakness.  He also points to her knee and she might have more knee pain.  Will refer to orthopedics.    I can see her back in 3 months after testing.    -     topiramate (TOPAMAX) 25 MG tablet; Take 1 tablet at night. Can increase to 2 tabs/night if needed. -_STOP  -     amitriptyline (ELAVIL) 50 MG tablet; Take 1 tablet (50 mg) by mouth At Bedtime  -     galcanezumab-gnlm (EMGALITY) 120 MG/ML injection; Inject 2 mLs (240 mg) Subcutaneous every 28 days Loading dose.  -     galcanezumab-gnlm (EMGALITY) 120 MG/ML injection; Inject 1 mL (120 mg) Subcutaneous every 28 days    -     Vitamin B12; Future  -     Vitamin B1 whole blood; Future  -     TSH with free T4 reflex; Future  -     Hemoglobin A1c; Future  -     Magnesium; Future  -     CK total; Future  -     Ferritin; Future  -     Comprehensive metabolic panel; Future  -     Protein Immunofixation Serum; Future  -     Protein electrophoresis; Future  -     MR Lumbar Spine w/o Contrast; Future  -     EMG; Future  -     MR Cervical Spine w/o Contrast;  Future  -     Polymyositis and Dermatomyositis Panel; Future  -     Orthopedic  Referral; Future    Return in about 3 months (around 1/26/2024) for In-Clinic Visit (must), After testing, Add on PAOR.    Over 45 minutes were spent coordinating the care for the patient on the day of the encounter.  This includes previsit, during visit and post visit activities as documented above.  Counseling patient.  Prescription management.  Medication side effects.  New problem.  Multiple test ordered.  Use of  requires extra time.  (Activities include but not inclusive of reviewing chart, reviewing outside records, reviewing labs and imaging study results as well as the images, patient visit time including getting history and exam,  use if applicable, review of test results with the patient and coming up with a plan in a shared model, counseling patient and family, education and answering patient questions, EMR , EMR diagnosis entry and problem list management, medication reconciliation and prescription management if applicable, paperwork if applicable, printing documents and documentation of the visit activities.)        Guerrero Prescott MD  Neurologist  [facility] Neurology [facility]  Tel:- [phone]    This note was dictated using voice recognition software.  Any grammatical or context distortions are unintentional and inherent to the software.

## 2023-10-26 NOTE — LETTER
10/26/2023         RE: Kulwant Amin  [address] [facility] [address]        Dear Colleague,    Thank you for referring your patient, Kulwant Amin, to the [facility] [place]. Please see a copy of my visit note below.    NEUROLOGY OUTPATIENT PROGRESS NOTE  Oct 26, 2023     CHIEF COMPLAINT/REASON FOR VISIT/REASON FOR CONSULT  Patient presents with:  Follow Up    REASON FOR CONSULTATION- Multiple issues    REFERRAL SOURCE   Referred Self  CC  Referred Self    HISTORY OF PRESENT ILLNESS  Kulwant Amin is a 53 year old female seen today for evaluation of multiple issues.    In 2021 she was found to have a right pontine stroke.  Presenting symptoms of vertigo.  No clear etiology for the stroke was identified it was thought to be lacunar.  She does have a history of hypertension, hyperlipidemia, diabetes.  She was supposed to be on Plavix though currently is only on aspirin.  No recurrence of stroke symptoms.  She is presented to the ER since then with vertigo symptoms and her imaging studies have been negative    1.  She reports that the vertigo is there all the time.  She is using meclizine which she finds some benefit with. She has done vestibular rehab without any improvement.    2.  Further complains of headaches.  These are around-the-clock.  He is taking Tylenol every 8 hours to get rid of the headaches.  Headaches are more frontal.  They can be throbbing in nature with photophobia and phonophobia.  She does continue visual auras as well.  She is on amitriptyline at nighttime.  She feels that it might be helping with the headaches.  Does not get good sleep due to COPD.  Headaches started after her stroke.    3.  No other new strokelike symptoms.    4.  Does complain of some lightheadedness especially during the exam today.  Has been put on hydrochlorothiazide by her primary care provider.    5/24/23  Patient returns today  1.  No stroke symptoms.  Is tolerating her stroke  medications  2.  Continues to have continuous headache 24/7.  Occasionally it will go away but then come back.  Is still overusing Tylenol.  Generally uses 1 tablet a day but can use up to 3 tablets a day.  Amitriptyline did help with the headaches and now they are not getting as severe as compared to before.  3.  Still feels dizzy.  Drinking plenty of water.    8/10/23  Patient returns today  1.  No further strokelike symptoms.  Continues to complain of some dizziness.  2.  Headaches have improved with use of the amitriptyline.  She continues to have a very mild continuous headache all the time.  She is only using the Tylenol 1-2 times a week.  The more severe headaches are only 1-2 times a week as well.  Amitriptyline does help without side effects though she would like to try other medications  3.  Drinks plenty of water.  No history of kidney stones.    10/26/23  Patient returns to the  1.  No further strokelike symptoms.  2.  Continues to have headaches every day.  Some associated dizziness.  She did try the Topamax and that was thought to be causing muscle cramps and as a result she stopped it.  No benefit with the headaches.  3.  Muscle cramps in the legs are still present.  When asked where she has pain in her legs she describes all over with stretching her knee and she might have knee pain.  She also reports bilateral leg weakness.  Does have chronic back pain that has not really worsened.  Some neck stiffness as well.  No other new neurological symptoms.    Previous history is reviewed and this is unchanged.    PAST MEDICAL/SURGICAL HISTORY  Past Medical History:   Diagnosis Date     Acute asthma exacerbation 01/06/2020     Anxiety      Arthritis      Asthma exacerbation 11/19/2015     Chronic obstructive pulmonary disease, unspecified COPD type (H)      COPD (chronic obstructive pulmonary disease) (H)      Coronary artery disease due to lipid rich plaque      Depression      Epigastric pain 12/15/2021      Essential hypertension      GERD (gastroesophageal reflux disease)      Infection due to 2019 novel coronavirus 2022    positive with COVID-19 on January 3, 2022     Latent tuberculosis 2019     Microcytic anemia 2019     S/P coronary artery stent placement 2018     TB lung, latent     9 mos INH     Patient Active Problem List   Diagnosis     Pulmonary nodule, right     Environmental allergies     TB lung, latent     COPD (chronic obstructive pulmonary disease)/Asthma      Essential hypertension     Cataracts, bilateral     Corneal opacity     Irregular astigmatism     Anxiety     Chronic nonintractable headache, unspecified headache type     Coronary artery disease due to lipid rich plaque     Dizziness     Hyperlipidemia     Moderate major depression (H)     Moderate persistent asthma     Unspecified visual loss     GERD (gastroesophageal reflux disease)     Dental caries     H/O arterial ischemic stroke     Constipation     Dysphagia     Chronic abdominal pain     Insomnia     FLACO (obstructive sleep apnea)- mild (AHI 14)     Chest pain     Chest pain, unspecified type     Type 2 diabetes mellitus treated with insulin (H)   Significant for arthritis, diabetes, high blood pressure, hyperlipidemia, stroke    FAMILY HISTORY  Family History   Problem Relation Age of Onset     Other - See Comments Mother          of an intestinal problem     Ulcers Father          of gastritis     Breast Cancer No family hx of    Negative for stroke    SOCIAL HISTORY  Social History     Tobacco Use     Smoking status: Former     Packs/day: 1.00     Years: 30.00     Additional pack years: 0.00     Total pack years: 30.00     Types: Cigarettes     Quit date: 2003     Years since quittin.8     Passive exposure: Never     Smokeless tobacco: Never     Tobacco comments:     No passive exposure   Vaping Use     Vaping Use: Never used   Substance Use Topics     Alcohol use: No     Drug use: No        SYSTEMS REVIEW  Twelve-system ROS was done and other than the HPI this was negative except for back pain, joint pain, numbness and tingling, weakness/paralysis, difficulty walking, balance/coordination problems, dizziness, headaches, anxiety, cardiac/heart problems, respiratory problems, snoring loudly.  No new symptoms/issues other than the HPI.    MEDICATIONS  acetaminophen (TYLENOL) 500 MG tablet, TAKE 1 PILL BY MOUTH EVERY 6 HOURS AS NEEDED FOR PAIN  albuterol (PROAIR HFA/PROVENTIL HFA/VENTOLIN HFA) 108 (90 Base) MCG/ACT inhaler, Inhale 2 puffs into the lungs every 4 hours as needed for shortness of breath  albuterol (PROVENTIL) (2.5 MG/3ML) 0.083% neb solution, Take 1 vial (2.5 mg) by nebulization every 6 hours as needed for shortness of breath, wheezing or cough  amitriptyline (ELAVIL) 50 MG tablet, Take 1 tablet (50 mg) by mouth At Bedtime  ASPIRIN LOW DOSE 81 MG EC tablet, TAKE 1 TABLET (81 MG TOTAL) BY MOUTH DAILY.  atorvastatin (LIPITOR) 80 MG tablet, TAKE 1 TABLET (80 MG TOTAL) BY MOUTH AT BEDTIME FOR CHOLESTEROL  B-D U/F 31G X 8 MM insulin pen needle, USE ONE PEN NEEDLE DAILY AS NEEDED FOR SSI  benzonatate (TESSALON) 100 MG capsule, Take 1 capsule (100 mg) by mouth 2 times daily as needed for cough  calcium carbonate (TUMS) 500 MG chewable tablet, Take 1 tablet (500 mg) by mouth 2 times daily as needed for heartburn  colchicine (COLCRYS) 0.6 MG tablet, Take 1 tablet (0.6 mg) by mouth daily  Continuous Blood Gluc Sensor (FREESTYLE MONICA 2 SENSOR) [facility] [place] 1 EACH EVERY 14 DAYS USE 1 SENSOR EVERY 14 DAYS. USE TO READ BLOOD SUGARS PER 'S INSTRUCTIONS.  cyclobenzaprine (FLEXERIL) 5 MG tablet, Take 5 mg by mouth 3 times daily as needed for muscle spasms  dextromethorphan (DELSYM) 30 MG/5ML liquid, Take 10 mLs (60 mg) by mouth 2 times daily as needed for cough  diclofenac (VOLTAREN) 1 % topical gel, Apply 4 g topically 4 times daily  docusate sodium (COLACE) 100 MG capsule, Take 1 capsule (100  mg) by mouth 2 times daily as needed for constipation  dupilumab (DUPIXENT) 300 MG/2ML prefilled pen, Inject 2 mLs (300 mg) Subcutaneous every 14 days  FLUoxetine (PROZAC) 10 MG capsule, TAKE 1 CAPSULE (10 MG) BY MOUTH DAILY  fluticasone-vilanterol (BREO ELLIPTA) 200-25 MCG/ACT inhaler, Inhale 1 puff into the lungs daily  gabapentin (NEURONTIN) 300 MG capsule, Take 2 capsules (600 mg) by mouth 3 times daily  hydrochlorothiazide (MICROZIDE) 12.5 MG capsule, TAKE 1 CAPSULE (12.5 MG TOTAL) BY MOUTH DAILY FOR BLOOD PRESSURE  hydrocortisone (CORTAID) 1 % external cream, Apply topically 2 times daily  ibuprofen (ADVIL/MOTRIN) 600 MG tablet, Take 600 mg by mouth every 6 hours as needed for moderate pain  insulin aspart (NOVOLOG PEN) 100 UNIT/ML pen, Inject 10 Units Subcutaneous 2 times daily (with meals)  insulin glargine (LANTUS PEN) 100 UNIT/ML pen, Inject 40 Units Subcutaneous every morning  insulin pen needle (32G X 4 MM) 32G X 4 MM miscellaneous, Use one pen needles daily or as directed.  ipratropium - albuterol 0.5 mg/2.5 mg/3 mL (DUONEB) 0.5-2.5 (3) MG/3ML neb solution, Take 1 vial (3 mLs) by nebulization every 6 hours as needed for shortness of breath / dyspnea or wheezing  lidocaine (XYLOCAINE) 5 % external ointment, Apply topically 3 times daily as needed Small amount (finger tip amount) to chest tid prn pain  meclizine (ANTIVERT) 12.5 MG tablet, TAKE 2 TABLETS (25 MG) BY MOUTH 3 TIMES DAILY AS NEEDED FOR DIZZINESS  metFORMIN (GLUCOPHAGE XR) 500 MG 24 hr tablet, Take 1 tablet (500 mg) by mouth 2 times daily (with meals)  metoprolol tartrate (LOPRESSOR) 25 MG tablet, TAKE 1 TABLET (25 MG TOTAL) BY MOUTH 2 (TWO) TIMES A DAY FOR BLOOD PRESSURE  montelukast (SINGULAIR) 10 MG tablet, Take 1 tablet (10 mg) by mouth At Bedtime  nystatin (MYCOSTATIN) 210870 UNIT/ML suspension, Take 1,000,000 mLs by mouth 4 times daily  omeprazole (PRILOSEC) 40 MG DR capsule, Take 1 capsule (40 mg) by mouth daily  Polyethylene Glycol 3350  (PEG 3350) 17 GM/SCOOP POWD, MIX 17 GRAMS WITH LIQUID AND DRINK ONCE DAILY FOR CONSTIPATION  sucralfate (CARAFATE) 1 GM/10ML suspension, Take 10 mLs (1 g) by mouth 4 times daily  tiotropium (SPIRIVA RESPIMAT) 2.5 MCG/ACT inhaler, Inhale 2 puffs into the lungs daily  topiramate (TOPAMAX) 25 MG tablet, Take 1 tablet at night. Can increase to 2 tabs/night if needed.    tezepelumab-ekko (TEZSPIRE) injection 210 mg         PHYSICAL EXAMINATION  VITALS: /80   Pulse 100   Resp 18   Wt 55.3 kg (122 lb)   BMI 25.50 kg/m    GENERAL: Healthy appearing, alert, no acute distress, normal habitus.  CARDIOVASCULAR: Extremities warm and well perfused. Pulses present.   NEUROLOGICAL:  Patient is awake and oriented to self, place and time.  Attention span is normal.  Memory is grossly intact.  Language is fluent and follows commands appropriately.  Appropriate fund of knowledge. Cranial nerves 2-12 are intact. There is no pronator drift.  Motor exam shows  4/5 strength in all extremities.  Generalized weakness due to pain all over.  Tone is symmetric bilaterally in upper and lower extremities.  Previously reflexes are symmetric and 1+ in upper extremities and lower extremities. Sensory exam is grossly intact to light touch, pin prick and vibration.  Finger to nose and heel to shin is without dysmetria.  Romberg is negative.  Gait is normal and the patient is able to do tandem walk and walk on toes and heels with mild difficulty.  Orthostatic vitals    Exam shows some generalized weakness related to pain.    DIAGNOSTICS  MRI 2022  IMPRESSION:  1.  No acute intracranial abnormalities identified.  2.  Minor chronic small vessel ischemic change, otherwise unremarkable contrast-enhanced MRI of the brain.    MRA                                                               IMPRESSION:  1.  Normal MRA [place].      MRA neck  IMPRESSION:  1.  Normal neck MRA.    Cardiac event monitor    ECHo  Left ventricular size, wall  motion and function are normal. The ejection  fraction is 55-60%.  There is borderline anterior, septal, and apical wall hypokinesis.  Normal right ventricle size and systolic function.  No hemodynamically significant valvular abnormalities on 2D or color flow  Imaging.      CTA 2021  HEAD CT:  1.  No evidence of acute hemorrhage, mass effect, or acute vascular territorial infarction.  2.  Severe left sphenoid sinus disease.     HEAD CTA:   1.  No evidence of proximal large vessel occlusion or flow-limiting stenosis.  2.  Dorsally directed 2 mm contour irregularity arising from the distal left supraclinoid ICA may represent a tiny posterior communicating artery aneurysm.     NECK CTA:  1.  No hemodynamically significant stenosis based on NASCET criteria.  2.  Mild left V1 segment stenosis.  3.  No evidence for dissection.      MRI 2020  IMPRESSION:  MRI BRAIN:  1. No finding for acute infarct, hemorrhage, or mass.  2. There are a few very small foci of FLAIR hyperintensity within the cerebral white matter, nonspecific but compatible with small areas of gliosis and/or chronic myelin loss.     MRA [place]:  1. Congenital variation of the [place] without vascular cutoff, aneurysm, or flow-limiting stenosis.      MRI 2021  HEAD MRI:   1.  Tiny linear focus of diffusion restriction within the right dorsal diomedes suspicious for acute small vessel infarct. This is new compared to 12/07/2020.  2.  No hemorrhagic transformation or mass effect. No additional infarct identified.  3.  Mild presumed microvascular ischemic change within the cerebral white matter.     HEAD MRA:   1.  No aneurysm, high flow AVM or significant stenosis identified.  2.  Variant [place] anatomy as above.     NECK MRA:  1.  Evaluation degraded by suboptimal timing of the contrast bolus and significant venous contamination the gadolinium bolus MRA.  2.  No hemodynamically significant stenosis in the carotid circulation by NASCET  criteria.  3.  Poor visualization of the left vertebral artery origin and proximal left V1 segment. This may be artifactual, but it is difficult to exclude high-grade stenosis in this region.      MRI 2021  IMPRESSION:  1.  No mass, hemorrhage, or recent infarct identified.  2.  Diffusion abnormality involving the right ventral diomedes seen on 01/13/2021 is no longer identified. No definite residual signal abnormality in this location to confirm prior infarct. This may relate to small size and slice selection.  3.  Inflammatory changes of the paranasal sinuses including bilateral maxillary air-fluid levels. Correlate with clinical evidence for sinusitis.      RELEVANT LABS  Component      Latest Ref Rng & Units 11/30/2022 2/5/2023   WBC      4.0 - 11.0 10e3/uL  9.3   RBC Count      3.80 - 5.20 10e6/uL  4.59   Hemoglobin      11.7 - 15.7 g/dL  11.8   Hematocrit      35.0 - 47.0 %  38.1   MCV      78 - 100 fL  83   MCH      26.5 - 33.0 pg  25.7 (L)   MCHC      31.5 - 36.5 g/dL  31.0 (L)   RDW      10.0 - 15.0 %  15.1 (H)   Platelet Count      150 - 450 10e3/uL  228   % Neutrophils      %  69   % Lymphocytes      %  14   % Monocytes      %  6   % Eosinophils      %  11   % Basophils      %  0   % Immature Granulocytes      %  0   NRBCs per 100 WBC      <1 /100  0   Absolute Neutrophils      1.6 - 8.3 10e3/uL  6.4   Absolute Lymphocytes      0.8 - 5.3 10e3/uL  1.3   Absolute Monocytes      0.0 - 1.3 10e3/uL  0.6   Absolute Eosinophils      0.0 - 0.7 10e3/uL  1.0 (H)   Absolute Basophils      0.0 - 0.2 10e3/uL  0.0   Absolute Immature Granulocytes      <=0.4 10e3/uL  0.0   Absolute NRBCs      10e3/uL  0.0   Sodium      136 - 145 mmol/L  138   Potassium      3.4 - 5.3 mmol/L  4.2   Chloride      98 - 107 mmol/L  102   Carbon Dioxide (CO2)      22 - 29 mmol/L  25   Anion Gap      7 - 15 mmol/L  11   Urea Nitrogen      6.0 - 20.0 mg/dL  10.6   Creatinine      0.51 - 0.95 mg/dL  0.58   Calcium      8.6 - 10.0 mg/dL  9.5    Glucose      70 - 99 mg/dL  156 (H)   GFR Estimate      >60 mL/min/1.73m2  >90   Hemoglobin A1C      0.0 - 5.6 % 8.6 (H)      OUTSIDE RECORDS  Outside referral notes and chart notes were reviewed and pertinent information has been summarized (in addition to the HPI):- Dr. Multani notes reviewed      ER notes above reviewed    Component      Latest Ref Rng 9/14/2023  9:21 AM 10/1/2023  9:42 AM   Sodium      135 - 145 mmol/L  136    Potassium      3.4 - 5.3 mmol/L  4.3    Chloride      98 - 107 mmol/L  102    Carbon Dioxide (CO2)      22 - 29 mmol/L  22    Anion Gap      7 - 15 mmol/L  12    Urea Nitrogen      6.0 - 20.0 mg/dL  9.2    Creatinine      0.51 - 0.95 mg/dL  0.53    GFR Estimate      >60 mL/min/1.73m2  >90    Calcium      8.6 - 10.0 mg/dL  9.1    Glucose      70 - 99 mg/dL  201 (H)    WBC      4.0 - 11.0 10e3/uL  7.2    RBC Count      3.80 - 5.20 10e6/uL  4.73    Hemoglobin      11.7 - 15.7 g/dL  11.6 (L)    Hematocrit      35.0 - 47.0 %  38.4    MCV      78 - 100 fL  81    MCH      26.5 - 33.0 pg  24.5 (L)    MCHC      31.5 - 36.5 g/dL  30.2 (L)    RDW      10.0 - 15.0 %  15.7 (H)    Platelet Count      150 - 450 10e3/uL  230    CRP Inflammation      <5.00 mg/L 3.20        Legend:  (H) High  (L) Low    IMPRESSION/REPORT/PLAN  Cerebrovascular accident of right pontine structure (H)  Medication overuse headache  Vertigo  Light headed  Intractable chronic migraine without aura and without status migrainosus  Bilateral leg weakness/muscle cramps versus knee pain    This is a 53 year old female history of right pontine stroke thought to be due to vascular risk factors.  Currently she is on aspirin for stroke prevention.  Further management of vascular risk factors per primary care.  No new stroke symptoms.    She does complain of vertigo and most likely this is related to her stroke.  Meclizine is currently helping and should continue.  She is tried vestibular rehab without any benefit.  Could be related to  headaches also.  There might not be any further treatment options available for this.  No change.    In terms of her headaches these are most suggestive of medication overuse headaches since she is overusing Tylenol.  Encourage her not to use the Tylenol more than 2-3 times per week; reemphasized this today.  With cutting down on the Tylenol there has been some improvement.  Amitriptyline at higher doses is also helping.  Topamax was not effective/caused side effects.  She is continues to have headaches every day.  We will try Emgality. Tylenol is sufficient for abortive therapy.  Her severe headaches do have migraine features and could be migraine headaches.  Amitriptyline was initially prescribed through primary care.    She does complain of some lightheadedness and orthostatic vitals were negative.  Most likely this is unrelated to the stroke.  Further management per primary care.  Encourage good hydration.  No change.  She is drinking plenty of water.    She does complain of new bilateral leg weakness/muscle cramps.  On exam she has generalized weakness related to her muscle pain.  We will check blood work/EMG to look for causes of myopathy.  We will also check MRI L-spine/C-spine to further evaluate her leg weakness.  He also points to her knee and she might have more knee pain.  Will refer to orthopedics.    I can see her back in 3 months after testing.    -     topiramate (TOPAMAX) 25 MG tablet; Take 1 tablet at night. Can increase to 2 tabs/night if needed. -_STOP  -     amitriptyline (ELAVIL) 50 MG tablet; Take 1 tablet (50 mg) by mouth At Bedtime  -     galcanezumab-gnlm (EMGALITY) 120 MG/ML injection; Inject 2 mLs (240 mg) Subcutaneous every 28 days Loading dose.  -     galcanezumab-gnlm (EMGALITY) 120 MG/ML injection; Inject 1 mL (120 mg) Subcutaneous every 28 days    -     Vitamin B12; Future  -     Vitamin B1 whole blood; Future  -     TSH with free T4 reflex; Future  -     Hemoglobin A1c; Future  -      Magnesium; Future  -     CK total; Future  -     Ferritin; Future  -     Comprehensive metabolic panel; Future  -     Protein Immunofixation Serum; Future  -     Protein electrophoresis; Future  -     MR Lumbar Spine w/o Contrast; Future  -     EMG; Future  -     MR Cervical Spine w/o Contrast; Future  -     Polymyositis and Dermatomyositis Panel; Future  -     Orthopedic  Referral; Future    Return in about 3 months (around 1/26/2024) for In-Clinic Visit (must), After testing, Add on PAOR.    Over 45 minutes were spent coordinating the care for the patient on the day of the encounter.  This includes previsit, during visit and post visit activities as documented above.  Counseling patient.  Prescription management.  Medication side effects.  New problem.  Multiple test ordered.  Use of  requires extra time.  (Activities include but not inclusive of reviewing chart, reviewing outside records, reviewing labs and imaging study results as well as the images, patient visit time including getting history and exam,  use if applicable, review of test results with the patient and coming up with a plan in a shared model, counseling patient and family, education and answering patient questions, EMR , EMR diagnosis entry and problem list management, medication reconciliation and prescription management if applicable, paperwork if applicable, printing documents and documentation of the visit activities.)        Guerrero Prescott MD  Neurologist  [facility]  Tel:- [phone]    This note was dictated using voice recognition software.  Any grammatical or context distortions are unintentional and inherent to the software.      Again, thank you for allowing me to participate in the care of your patient.        Sincerely,        Guerrero Prescott MD

## 2023-10-31 ENCOUNTER — TELEPHONE (OUTPATIENT)
Dept: PULMONOLOGY | Facility: CLINIC | Age: 53
End: 2023-10-31
Payer: COMMERCIAL

## 2023-10-31 DIAGNOSIS — J45.50 SEVERE PERSISTENT ASTHMA, UNSPECIFIED WHETHER COMPLICATED (H): Primary | ICD-10-CM

## 2023-10-31 DIAGNOSIS — R06.2 WHEEZING: ICD-10-CM

## 2023-10-31 RX ORDER — PREDNISONE 20 MG/1
40 TABLET ORAL DAILY
Qty: 14 TABLET | Refills: 0 | Status: SHIPPED | OUTPATIENT
Start: 2023-10-31 | End: 2023-11-07

## 2023-10-31 NOTE — TELEPHONE ENCOUNTER
[facility] Call Center    Phone Message    May a detailed message be left on voicemail: yes     Reason for Call: Medication Refill Request    Has the patient contacted the pharmacy for the refill? Yes   Name of medication being requested: prednisone   Provider who prescribed the medication: Dr Duong   Pharmacy: [facility] - [place], [place] - [address]   Date medication is needed: 10/31      Action Taken: Message routed to:  Other: [facility] [employer]     Travel Screening: Not Applicable

## 2023-10-31 NOTE — TELEPHONE ENCOUNTER
Spoke with Bilyl. Kulwant is having some wheezing, increase in cough, clear colored phlegm but denies fevers. She is wondering if she could get some prednisone for Kulwant. Per Ellen Harris CNP action plan orders : will send prednisone 40 mg x 7 days. She agrees to bring Kulwant in to the urgent care or ER if symptoms do not improve.

## 2023-11-07 ENCOUNTER — OFFICE VISIT (OUTPATIENT)
Dept: PHARMACY | Facility: CLINIC | Age: 53
End: 2023-11-07
Payer: COMMERCIAL

## 2023-11-07 ENCOUNTER — LAB (OUTPATIENT)
Dept: LAB | Facility: CLINIC | Age: 53
End: 2023-11-07
Payer: COMMERCIAL

## 2023-11-07 VITALS
WEIGHT: 123.5 LBS | HEART RATE: 101 BPM | DIASTOLIC BLOOD PRESSURE: 76 MMHG | BODY MASS INDEX: 25.81 KG/M2 | OXYGEN SATURATION: 96 % | SYSTOLIC BLOOD PRESSURE: 110 MMHG

## 2023-11-07 DIAGNOSIS — R52 PAIN: ICD-10-CM

## 2023-11-07 DIAGNOSIS — R51.9 CHRONIC NONINTRACTABLE HEADACHE, UNSPECIFIED HEADACHE TYPE: ICD-10-CM

## 2023-11-07 DIAGNOSIS — E11.9 TYPE 2 DIABETES MELLITUS TREATED WITH INSULIN (H): Primary | ICD-10-CM

## 2023-11-07 DIAGNOSIS — I31.9 PERICARDITIS, UNSPECIFIED CHRONICITY, UNSPECIFIED TYPE: ICD-10-CM

## 2023-11-07 DIAGNOSIS — R05.1 ACUTE COUGH: ICD-10-CM

## 2023-11-07 DIAGNOSIS — F33.9 RECURRENT MAJOR DEPRESSIVE DISORDER, REMISSION STATUS UNSPECIFIED (H): ICD-10-CM

## 2023-11-07 DIAGNOSIS — K59.00 CONSTIPATION, UNSPECIFIED CONSTIPATION TYPE: ICD-10-CM

## 2023-11-07 DIAGNOSIS — R29.898 WEAKNESS OF BOTH LOWER EXTREMITIES: ICD-10-CM

## 2023-11-07 DIAGNOSIS — G89.29 CHRONIC NONINTRACTABLE HEADACHE, UNSPECIFIED HEADACHE TYPE: ICD-10-CM

## 2023-11-07 DIAGNOSIS — Z79.4 TYPE 2 DIABETES MELLITUS TREATED WITH INSULIN (H): Primary | ICD-10-CM

## 2023-11-07 DIAGNOSIS — J45.50 SEVERE PERSISTENT ASTHMA, UNSPECIFIED WHETHER COMPLICATED (H): ICD-10-CM

## 2023-11-07 DIAGNOSIS — E78.5 HYPERLIPIDEMIA, UNSPECIFIED HYPERLIPIDEMIA TYPE: ICD-10-CM

## 2023-11-07 DIAGNOSIS — K21.9 GASTROESOPHAGEAL REFLUX DISEASE WITHOUT ESOPHAGITIS: ICD-10-CM

## 2023-11-07 DIAGNOSIS — R25.2 LEG CRAMPS: ICD-10-CM

## 2023-11-07 DIAGNOSIS — I10 ESSENTIAL HYPERTENSION: ICD-10-CM

## 2023-11-07 LAB
ALBUMIN SERPL BCG-MCNC: 4.1 G/DL (ref 3.5–5.2)
ALP SERPL-CCNC: 116 U/L (ref 35–104)
ALT SERPL W P-5'-P-CCNC: 41 U/L (ref 0–50)
ANION GAP SERPL CALCULATED.3IONS-SCNC: 13 MMOL/L (ref 7–15)
AST SERPL W P-5'-P-CCNC: 48 U/L (ref 0–45)
BILIRUB SERPL-MCNC: 0.3 MG/DL
BUN SERPL-MCNC: 13.3 MG/DL (ref 6–20)
CALCIUM SERPL-MCNC: 9.3 MG/DL (ref 8.6–10)
CHLORIDE SERPL-SCNC: 100 MMOL/L (ref 98–107)
CK SERPL-CCNC: 59 U/L (ref 26–192)
CREAT SERPL-MCNC: 0.59 MG/DL (ref 0.51–0.95)
CREAT UR-MCNC: 32.3 MG/DL
DEPRECATED HCO3 PLAS-SCNC: 25 MMOL/L (ref 22–29)
EGFRCR SERPLBLD CKD-EPI 2021: >90 ML/MIN/1.73M2
FERRITIN SERPL-MCNC: 26 NG/ML (ref 11–328)
GLUCOSE SERPL-MCNC: 184 MG/DL (ref 70–99)
HBA1C MFR BLD: 11.1 % (ref 0–5.6)
MAGNESIUM SERPL-MCNC: 1.7 MG/DL (ref 1.7–2.3)
MICROALBUMIN UR-MCNC: <12 MG/L
MICROALBUMIN/CREAT UR: NORMAL MG/G{CREAT}
POTASSIUM SERPL-SCNC: 4 MMOL/L (ref 3.4–5.3)
PROT SERPL-MCNC: 7.1 G/DL (ref 6.4–8.3)
SODIUM SERPL-SCNC: 138 MMOL/L (ref 135–145)
TOTAL PROTEIN SERUM FOR ELP: 6.7 G/DL (ref 6.4–8.3)
TSH SERPL DL<=0.005 MIU/L-ACNC: 0.35 UIU/ML (ref 0.3–4.2)
VIT B12 SERPL-MCNC: 602 PG/ML (ref 232–1245)

## 2023-11-07 PROCEDURE — 82728 ASSAY OF FERRITIN: CPT

## 2023-11-07 PROCEDURE — 99606 MTMS BY PHARM EST 15 MIN: CPT | Performed by: PHARMACIST

## 2023-11-07 PROCEDURE — 83516 IMMUNOASSAY NONANTIBODY: CPT | Mod: 90

## 2023-11-07 PROCEDURE — 82607 VITAMIN B-12: CPT

## 2023-11-07 PROCEDURE — 83735 ASSAY OF MAGNESIUM: CPT

## 2023-11-07 PROCEDURE — 84443 ASSAY THYROID STIM HORMONE: CPT

## 2023-11-07 PROCEDURE — 83036 HEMOGLOBIN GLYCOSYLATED A1C: CPT

## 2023-11-07 PROCEDURE — 36415 COLL VENOUS BLD VENIPUNCTURE: CPT

## 2023-11-07 PROCEDURE — 86235 NUCLEAR ANTIGEN ANTIBODY: CPT | Mod: 90

## 2023-11-07 PROCEDURE — 82570 ASSAY OF URINE CREATININE: CPT

## 2023-11-07 PROCEDURE — 84425 ASSAY OF VITAMIN B-1: CPT | Mod: 90

## 2023-11-07 PROCEDURE — 84155 ASSAY OF PROTEIN SERUM: CPT | Mod: 59

## 2023-11-07 PROCEDURE — 86334 IMMUNOFIX E-PHORESIS SERUM: CPT | Performed by: STUDENT IN AN ORGANIZED HEALTH CARE EDUCATION/TRAINING PROGRAM

## 2023-11-07 PROCEDURE — 99607 MTMS BY PHARM ADDL 15 MIN: CPT | Performed by: PHARMACIST

## 2023-11-07 PROCEDURE — 99000 SPECIMEN HANDLING OFFICE-LAB: CPT

## 2023-11-07 PROCEDURE — 84165 PROTEIN E-PHORESIS SERUM: CPT | Performed by: STUDENT IN AN ORGANIZED HEALTH CARE EDUCATION/TRAINING PROGRAM

## 2023-11-07 PROCEDURE — 82550 ASSAY OF CK (CPK): CPT

## 2023-11-07 PROCEDURE — 84182 PROTEIN WESTERN BLOT TEST: CPT | Mod: 90

## 2023-11-07 PROCEDURE — 82043 UR ALBUMIN QUANTITATIVE: CPT

## 2023-11-07 PROCEDURE — 80053 COMPREHEN METABOLIC PANEL: CPT

## 2023-11-07 RX ORDER — LORATADINE 10 MG/1
10 TABLET ORAL DAILY
COMMUNITY
End: 2024-06-10

## 2023-11-07 RX ORDER — DOCUSATE SODIUM 100 MG/1
200 CAPSULE, LIQUID FILLED ORAL 2 TIMES DAILY PRN
Qty: 90 CAPSULE | Refills: 3 | Status: SHIPPED | OUTPATIENT
Start: 2023-11-07 | End: 2024-03-14

## 2023-11-07 RX ORDER — DEXTROMETHORPHAN POLISTIREX 30 MG/5ML
60 SUSPENSION ORAL 2 TIMES DAILY PRN
Qty: 178 ML | Refills: 0 | Status: SHIPPED | OUTPATIENT
Start: 2023-11-07 | End: 2024-02-15

## 2023-11-07 NOTE — PROGRESS NOTES
Medication Therapy Management (MTM) Encounter    ASSESSMENT:                            Medication Adherence/Access: No issues identified    1. Type 2 diabetes mellitus treated with insulin (H)  A1c vastly elevated, not at goal <7%. Patient would benefit from increasing both basal and bolus insulin doses, sending updated prescriptions today. Could consider further increasing metformin to max dose at next visit, did not address today due to other changes.  Of note, patient's current blood sugars likely elevated due to oral steroid use.    2. Essential hypertension  BP meeting goal of <130/80 mmHg. For now, continue current medications without change.    3. Hyperlipidemia, unspecified hyperlipidemia type  Patient appropriately taking high intensity statin and LDL at goal.  Of note, patient mentions symptoms of bilateral leg pain which has been ongoing though patient not interested in adjusting medication today, could consider assessing for statin-induced myopathy in the future, will defer to PCP to further assess.   Additionally, routing note to neurologist to clarify if patient should be taking higher dose of aspirin or if patient should be taking clopidogrel.     4. Pericarditis, unspecified chronicity, unspecified type  Managed by patient's cardiologist, continues to take colchicine daily as prescribed.    5. Severe persistent asthma/Allergies/Acute cough  Managed by patient's pulmonologist and allergist. Continue current medications as directed (marked dupixent as not taking).  Of note, would recommend avoiding chronic use of oral steroids due to worsening diabetes control. Agree with retrial of dextromethorphan, sending refill to pharmacy today. Recommended patient refer to allergist and pulmonologist if cough not improving or worsening.     6. Pain  Continue current medications and further address with PCP if concerns.     7. Recurrent major depressive disorder, remission status unspecified (H24)  Patient may  benefit from dose increase of fluoxetine, though patient declines recommendation today.    8. Chronic nonintractable headache, unspecified headache type  Managed by patient's neurologist with amitriptyline and Emgality, will reports to neurologist patient reported side effects since initiating Emgality.     9. Gastroesophageal reflux disease without esophagitis  Stable.     10. Constipation, unspecified constipation type  Recommend further increasing docusate sodium dose to help with BM consistency. Recommend continuing current miralax as directed.     PLAN:                            Increase dose: Lantus 44 units once daily  Increase dose: Novolog 16 units twice daily before meals  Increase dose: docusate sodium 200 mg twice daily as needed   Refill of the dextromethorphan 10 ml twice daily as needed for cough  Lab today: A1c  Routing note to neurologist today to clarify antiplatelet recommendation     Medication issues to be addressed at a future visit:   Annual urine albumin  Increase dose metformin    Follow-up: Return in about 2 months (around 1/22/2024) for With PharmD.  PCP 12/5; allergy 11/9; card 11/8&12/14; pulm 3/6    SUBJECTIVE/OBJECTIVE:                          Kulwant Amin is a 53 year old female coming in for a follow-up visit from 9/13/23. Patient was accompanied by Daughter in law. [facility]  ID# [id number].     Reason for visit: MTM follow up.    Allergies/ADRs: Reviewed in chart  Past Medical History: Reviewed in chart  Tobacco: She reports that she quit smoking about 20 years ago. Her smoking use included cigarettes. She has a 30.00 pack-year smoking history. She has never been exposed to tobacco smoke. She has never used smokeless tobacco.  Alcohol: none    Medication Adherence/Access: Patient has a home health nurse that sets up a three times daily pillbox. Patient brings with her medication vials today but does NOT bring her pillbox.   Does present with extra vials of  medications today though states that this is due to recent hospitalizations that cause the extra supply.     Diabetes   Metformin  mg twice daily with food  Lantus 40 units once daily at night  Novolog 10 units twice daily before meals. Of note, daughter in law states that they are using novolog before breakfast and after breakfast, though when recommended using twice daily before meals then confirmed that is what she is doing.   Daughter in law helps patient with insulin administration. Reports NO missed doses of insulin.     Aspirin 81mg daily  Patient is not experiencing side effects. No longer experiencing nausea/vomiting since switching metformin IR to ER.   Blood sugar monitoring: Freestyle Gemma CGM; see below.   Current diabetes symptoms: polyuria, polydipsia, fatigue, and vision changes  Diet/Exercise: Reports eating 2 meals per day, usually eats brown rice, beans, lentils, veggies. Only eating a small amount of rice. States that she hasn't made any dietary changes since last visit. Admits that she will eat a piece of fruit between meals sometimes.      Eye exam is up to date  Foot exam is up to date  Urine Albumin:   Lab Results   Component Value Date    UMALCR  11/30/2022      Comment:      Unable to calculate, urine albumin and/or urine creatinine is outside detectable limits.  Microalbuminuria is defined as an albumin:creatinine ratio of 17 to 299 for males and 25 to 299 for females. A ratio of albumin:creatinine of 300 or higher is indicative of overt proteinuria.  Due to biologic variability, positive results should be confirmed by a second, first-morning random or 24-hour timed urine specimen. If there is discrepancy, a third specimen is recommended. When 2 out of 3 results are in the microalbuminuria range, this is evidence for incipient nephropathy and warrants increased efforts at glucose control, blood pressure control, and institution of therapy with an angiotensin-converting-enzyme (ACE)  inhibitor (if the patient can tolerate it).        Lab Results   Component Value Date    A1C 11.1 (H) 11/07/2023        Hypertension   Hydrochlorothiazide 12.5 mg daily  Metoprolol tartrate 25 mg twice daily   Patient reports the following medication side effects: dizziness all the time, hence taking meclizine three times daily.  Patient self-monitors blood pressure.  Home BP monitoring not mentioned today.       BP Readings from Last 3 Encounters:   11/07/23 110/76   10/26/23 111/80   10/25/23 130/82     Pulse Readings from Last 3 Encounters:   11/07/23 101   10/26/23 100   10/25/23 94     Hyperlipidemia   Atorvastatin 80 mg daily  Patient reports muscle pains bilaterally in her legs, including the knees. States that this has been present for a long time and her doctors are already aware of this.   Of note, based on last neurologist note, unable to clarify if dose of aspirin should be increased or if clopidogrel should be added due to history of pontine stroke.  The ASCVD Risk score (Doris CANDELARIO, et al., 2019) failed to calculate for the following reasons:    The patient has a prior MI or stroke diagnosis     Recent Labs   Lab Test 03/14/23  1140 02/16/23  1137 03/25/22  1200   CHOL 125  --  145   HDL 41*  --  52   LDL 41 45 54   TRIG 214*  --  197*     Pericarditis    Colchicine 0.6 mg daily (started 7/24)  Reports improvement of chest pain. Visit with cardiologist tomorrow.      Allergies/asthma/hypereosinophilia:   Loratadine 10mg daily  Montelukast 10 mg daily at bedtime  Breo Ellipta 200/25mcg 1 puff daily  Spiriva 2.5 mcg 2 puffs daily  Albuterol HFA daily as needed   Albuterol nebs three times daily   Benzonatate 100 mg twice daily as needed (does not bring with today but has it at home and taking)  Tezspire 210 mg every 28 days (Dupixent stopped)  Dextromethorphan 10 mL twice daily  - not using this, has not had it for a long time.   Prednisone 40 mg daily (started on 10/31 per pulm. team for asthma/wheezing)    Patient completed the prednisone, doesn't think that it really help, still having coughing and wheezing  Pulmonologist: [facility], Tamra Mohr.   Allergy & immunology: [facility], Dr. Elizabeth Marie.     Pain  Gabapentin 600 mg three times daily   Cyclobenzaprine 5 mg TID   Diclofenac 1% gel 4g 4 times daily     Mood  Fluoxetine 10 mg daily  Reports that sometimes her mood is up and sometimes down. Not interested in dose increase today.       6/29/2023    10:19 AM 8/8/2023     9:21 AM 10/5/2023    10:41 AM   PHQ   PHQ-9 Total Score 10 16 7   Q9: Thoughts of better off dead/self-harm past 2 weeks Not at all Not at all Not at all     Migraine/headache  Amitriptyline 50 mg daily  Acetaminophen as needed for headaches, does not report ow often.  Seeing neurologist, Dr. Prescott.   Emgality - after getting the injection she has been complaining of lower back pain, stating it makes her stiff. Though thinks it might have started helping with headaches though still getting headaches but not sure if its from all the coughing.     GERD  Omeprazole 40 mg daily in the morning  Sucralfate 1g 3 times daily (does not bring with today but still taking)  Tums 1 tablet twice daily PRN   Reports well controlled symptoms with current regimen.     Constipation  Docusate 100 mg twice daily   Miralax 17 g daily  Reports still have BM 1-2 times daily though states that the stools are still difficult to pass and interested in increasing the doses today.     Today's Vitals: /76   Pulse 101   Wt 123 lb 8 oz (56 kg)   SpO2 96%   BMI 25.81 kg/m    ----------------      I spent 40 minutes with this patient today. All changes were made via collaborative practice agreement with Adrien Cardoza MD. A copy of the visit note was provided to the patient's provider(s).    A summary of these recommendations was given to the patient.    Malu Gil, PharmD, BCACP  Medication Therapy Management Pharmacist        Medication Therapy Recommendations  Acute cough    Current Medication: dextromethorphan (DELSYM) 30 MG/5ML liquid   Rationale: Synergistic therapy - Needs additional medication therapy - Indication   Recommendation: Start Medication   Status: Accepted per CPA         Constipation    Current Medication: docusate sodium (COLACE) 100 MG capsule   Rationale: Dose too low - Dosage too low - Effectiveness   Recommendation: Increase Dose   Status: Accepted per CPA         Type 2 diabetes mellitus treated with insulin (H)    Current Medication: insulin aspart (NOVOLOG PEN) 100 UNIT/ML pen   Rationale: Dose too low - Dosage too low - Effectiveness   Recommendation: Increase Dose   Status: Accepted per CPA          Current Medication: insulin glargine (LANTUS PEN) 100 UNIT/ML pen   Rationale: Dose too low - Dosage too low - Effectiveness   Recommendation: Increase Dose   Status: Accepted per CPA

## 2023-11-07 NOTE — PATIENT INSTRUCTIONS
"Recommendations from today's MTM visit:                                                      Increase dose: Lantus 44 units once daily  Increase dose: Novolog 16 units twice daily before meals  Increase dose: docusate sodium 200 mg twice daily as needed   Refill of the dextromethorphan 10 ml twice daily as needed for cough    Follow-up: Return in about 2 months (around 1/22/2024) for With PharmD.    It was great speaking with you today.  I value your experience and would be very thankful for your time in providing feedback in our clinic survey. In the next few days, you may receive an email or text message from [employer] with a link to a survey related to your  clinical pharmacist.\"     To schedule another MTM appointment, please call the clinic directly or you may call the MTM scheduling line at [phone] or toll-free at [phone].     My Clinical Pharmacist's contact information:                                                      Please feel free to contact me with any questions or concerns you have.      Malu Gil, PharmD, [facility] Hospital[facility]  Medication Therapy Management Pharmacist  "

## 2023-11-07 NOTE — Clinical Note
Increased insulins today given high A1c. Also increased her docusate dose due to constipation and refilled the cough suppressant. She needs urine albumin checked when you see her next. Can also further increase the metformin dose.   Ajylon

## 2023-11-07 NOTE — Clinical Note
Dr. Prescott, I met with the patient today.   1. She states that she started the Emgality recently, though since then has noticed a worsening pain/stiffness in her lower back. Can you follow up with her about this whether this is something to expect or if something needs to change based on this reaction?  2.  Additionally I was a bit confused when reading your most recent note about whether or not patient should currently be taking aspirin (81 vs 325 mg) and clopidogrel (or not)? Right now patient taking aspirin 81 mg daily only. At one point appears Dr Multani recommended aspirin 325 mg + clopidogrel. Please clarify.   Thank you Malu Gil, PharmD, BCACP Medication Therapy Management Pharmacist

## 2023-11-07 NOTE — PROGRESS NOTES
HEART CARE ENCOUNTER NOTE      [facility]  [phone]      Assessment/Recommendations   Assessment:   Coronary artery disease: status post drug-eluting stenting x1 to the [id number] left anterior descending artery 1/25/2018. Her last coronary angiogram 5/5/2022 showed a patent stent and no new obstructive lesions. Pharmacologic nuclear stress testing 4/17/2023 showed no clear evidence of ischemia.   Supraventricular tachycardia: This has been noted on outside telemetry strips but does not appear to have ever been captured on any resting 12-lead electrocardiogram or ambulatory cardiac monitor in our system.  Patient had cardiac event monitor October 2022 that did not show any SVT.  Patient currently notes feeling the occasional heart racing during her illness.  Heart rate is currently 99 in clinic but suspect this is related to patient's illness as she appears ill in clinic and is coughing and taking deep breaths.   Hypertension: well controlled on hydrochlorothiazide 12.5 mg daily, metoprolol 25 mg twice daily  Hyperlipidemia: well controlled on atorvastatin 80 mg daily. Last LDL 3/14/23 was 41 mg/dL.  Mild obstructive sleep apnea: noted on polysomnography 4/1/2022.  Diabetes mellitus: on insulin, metformin  History of right pontine CVA: 2021. Follows with neurology. Has vertigo and frequent headaches. Recently started emgality. Helping a little with the headaches.     Plan:   Continue current medications.  Refilled colchicine for the chest pain related to her pericarditis.  Reassured chest pain is improving.  If chest pain starts to worsen or shortness of breath does not improve with resolution of illness should let me know  Follow-up in 6 months with Dr. Hirsch, sooner if needed.      The level of medical decision making during this visit was of moderate complexity.    Follow up in 6 months with Dr. Hirsch.      History of Present Illness/Subjective    HPI: Kulwant Amin is a 53  year old female with PMHx of CAD s/p VINICIO x1 to the proximal-mid LAD 1/25/2018, right pontine CVA 1/13/2021, severe obstructive lung disease, SVT, mild FLACO and HLD presents for follow up.    She was hospitalized 7/24/2023 - 7/26/2023 with chest pain diagnosed as acute pericarditis. Troponins and BNP was normal. ECG showed subtle ST elevations. CRP was elevated at 11. She was started on colchicine 0.6 mg twice daily for a month then had the dose reduced to 0.6 mg just once daily.     Today patient notes she has been sick for the past 3 days with cough fever and wheezing.  Patient is noting difficulty breathing.  O2 on exam is 97%.  Patient is going to the ER/urgent care from this visit for her illness.      Patient notes that she is still having some mild chest discomfort from her pericarditis though it is improving.  Is currently having shortness of breath but notes this is related to her illness.  She does note her heart is felt as if it was racing since she got sick.  Notes she will occasionally have short sensations of heart racing but that it does not last long.   She denies lightheadedness, orthopnea, PND, abdominal fullness/bloating, and lower extremity edema.        Echocardiogram 7/25/23 Results:  The left ventricle is normal in size.  Left ventricular function is normal.The ejection fraction is 55-60%.  No hemodynamically significant valvular abnormalities on 2D or color flow  imaging.    Nuclear stress test 4/17/23:    The nuclear stress test is negative for inducible myocardial ischemia or infarction.    The patient is at a low risk of future cardiac ischemic events.    Left ventricular function is hyperdynamic.    The left ventricular ejection fraction at stress is greater than 70%.    A prior study was conducted on 1/22/2019.  This study has changes noted when compared with the prior study. There is now evidence of transient ischemic dilation.    Stress to rest cavity ratio is 1.38.  TID is present  quantitatively but not visually.  This is a nonspecific finding that can indicate the presence of subendocardial ischemia and clinical correlation recommended.     Cardiac event monitor 10/19/22:  Indication for study: Supraventricular tachycardia  Time monitored: 11 days 23 hours.    The baseline rhythm transmission demonstrated normal sinus rhythm with heart rates in the 90s. The MT interval, QRS duration, and QT interval, all appear to be normal.  The patient had 2 manually activated rhythm recordings.  Symptoms were reported as heart racing.  The patient's rhythm demonstrated normal sinus rhythm with heart rates ranging between 85 and 96 bpm.  The patient had 8 auto triggered recordings.  Symptoms were not reported.  The patient's rhythm demonstrated normal sinus rhythm with heart rates ranging between 91 and 116 bpm.    Essentially normal multi day patient activated monitor.  Patient's symptomatic recordings correlated with normal sinus rhythm.  No sustained atrial or ventricular tachyarrhythmia.  No profound bradycardia or significant pauses.     Physical Examination  Review of Systems   Vitals: /68 (BP Location: Left arm, Patient Position: Sitting, Cuff Size: Adult Regular)   Pulse 99   Resp 24   Wt 55.3 kg (122 lb)   SpO2 98%   BMI 25.50 kg/m    BMI= Body mass index is 25.5 kg/m .  Wt Readings from Last 3 Encounters:   11/08/23 55.3 kg (122 lb)   11/07/23 56 kg (123 lb 8 oz)   10/26/23 55.3 kg (122 lb)           ENT/Mouth: membranes moist, no oral lesions or bleeding gums.      EYES:  no scleral icterus, normal conjunctivae                    Neck: No thyromegaly   Chest/Lungs:   Lungs-rhonchi heard throughout, no rales or wheezing, equal chest wall expansion    Cardiovascular:   Regular. Normal first and second heart sounds with no murmurs, rubs, or gallops; the carotid, radial and posterior tibial pulses are intact, Jugular venous pressure normal, no edema bilaterally        Extremities: no  cyanosis or clubbing   Skin: no xanthelasma, warm.    Neurologic: no tremors     Psychiatric: alert and oriented x3, calm        Please refer above for cardiac ROS details.        Medical History  Surgical History Family History Social History   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    Microcytic anemia 11/17/2019    S/P coronary artery stent placement 02/02/2018    TB lung, latent     9 mos INH     Past Surgical History:   Procedure Laterality Date    CORONARY STENT PLACEMENT  2018    CV CORONARY ANGIOGRAM N/A 1/25/2018    Procedure: Coronary Angiogram;  Surgeon: Angelo Serrano MD;  Location: [facility] Cath Lab;  Service:     CV CORONARY ANGIOGRAM N/A 5/5/2022    Procedure: Coronary Angiogram;  Surgeon: Irwin Cano MD;  Location: [facility] CATH LAB CV    CV CORONARY ANGIOGRAM  5/5/2022    Procedure: ;  Surgeon: Irwin Cano MD;  Location: [facility] CV    PA ESOPHAGOGASTRODUODENOSCOPY TRANSORAL DIAGNOSTIC N/A 12/10/2018    Procedure: ESOPHAGOGASTRODUODENOSCOPY (EGD);  Surgeon: Eddie Renteria MD;  Location: [facility];  Service: Gastroenterology    PA ESOPHAGOGASTRODUODENOSCOPY TRANSORAL DIAGNOSTIC N/A 12/3/2020    Procedure: ESOPHAGOGASTRODUODENOSCOPY (EGD) with biospies ;  Surgeon: Avi Crow MD;  Location: [facility];  Service: Gastroenterology    [facility] COLONOSCOPY W/WO BRUSH/WASH N/A 12/10/2018    Procedure: COLONOSCOPY with polypectomy using biopsy forceps;   Surgeon: Eddie Renteria MD;  Location: [facility];  Service: Gastroenterology     Family History   Problem Relation Age of Onset    Other - See Comments Mother          of an intestinal problem    Ulcers Father          of gastritis    Breast Cancer No family hx of         Social History     Socioeconomic History    Marital status:      Spouse name: Not on file    Number of children: Not on file    Years of education: Not on file    Highest education level: Not on file   Occupational History    Not on file   Tobacco Use    Smoking status: Former     Packs/day: 1.00     Years: 30.00     Additional pack years: 0.00     Total pack years: 30.00     Types: Cigarettes     Quit date: 2003     Years since quittin.8     Passive exposure: Never    Smokeless tobacco: Never    Tobacco comments:     No passive exposure   Vaping Use    Vaping Use: Never used   Substance and Sexual Activity    Alcohol use: No    Drug use: No    Sexual activity: Yes     Partners: Male   Other Topics Concern    Parent/sibling w/ CABG, MI or angioplasty before 65F 55M? Not Asked   Social History Narrative    2017 The patient lives with her daughter-in-law (who is present), , son, and 2 grandchildren (total of 6 people). Immigrant.     Social Determinants of Health     Financial Resource Strain: Low Risk  (10/5/2023)    Financial Resource Strain     Within the past 12 months, have you or your family members you live with been unable to get utilities (heat, electricity) when it was really needed?: No   Food Insecurity: Low Risk  (10/5/2023)    Food Insecurity     Within the past 12 months, did you worry that your food would run out before you got money to buy more?: No     Within the past 12 months, did the food you bought just not last and you didn t have money to get more?: No   Transportation Needs: Low Risk  (10/5/2023)    Transportation Needs     Within the past 12 months, has lack of transportation kept  you from medical appointments, getting your medicines, non-medical meetings or appointments, work, or from getting things that you need?: No   Physical Activity: Not on file   Stress: Not on file   Social Connections: Not on file   Interpersonal Safety: Not on file   Housing Stability: Low Risk  (10/5/2023)    Housing Stability     Do you have housing? : Yes     Are you worried about losing your housing?: No           Medications  Allergies   Current Outpatient Medications   Medication Sig Dispense Refill    acetaminophen (TYLENOL) 500 MG tablet TAKE 1 PILL BY MOUTH EVERY 6 HOURS AS NEEDED FOR PAIN 100 tablet 10    albuterol (PROAIR HFA/PROVENTIL HFA/VENTOLIN HFA) 108 (90 Base) MCG/ACT inhaler Inhale 2 puffs into the lungs every 4 hours as needed for shortness of breath 18 g 11    albuterol (PROVENTIL) (2.5 MG/3ML) 0.083% neb solution Take 1 vial (2.5 mg) by nebulization every 6 hours as needed for shortness of breath, wheezing or cough 240 mL 11    amitriptyline (ELAVIL) 50 MG tablet Take 1 tablet (50 mg) by mouth At Bedtime 90 tablet 1    ASPIRIN LOW DOSE 81 MG EC tablet TAKE 1 TABLET (81 MG TOTAL) BY MOUTH DAILY. 90 tablet 3    atorvastatin (LIPITOR) 80 MG tablet TAKE 1 TABLET (80 MG TOTAL) BY MOUTH AT BEDTIME FOR CHOLESTEROL 90 tablet 0    B-D U/F 31G X 8 MM insulin pen needle USE ONE PEN NEEDLE DAILY AS NEEDED FOR  each 1    benzonatate (TESSALON) 100 MG capsule Take 1 capsule (100 mg) by mouth 2 times daily as needed for cough 100 capsule 1    calcium carbonate (TUMS) 500 MG chewable tablet Take 1 tablet (500 mg) by mouth 2 times daily as needed for heartburn 30 tablet 0    colchicine (COLCRYS) 0.6 MG tablet Take 1 tablet (0.6 mg) by mouth daily 90 tablet 3    Continuous Blood Gluc Sensor (FREESTYLE MONICA 2 SENSOR) [facility] 1 EACH EVERY 14 DAYS USE 1 SENSOR EVERY 14 DAYS. USE TO READ BLOOD SUGARS PER 'S INSTRUCTIONS. 2 each 11    cyclobenzaprine (FLEXERIL) 5 MG tablet Take 5 mg by mouth 3 times  daily as needed for muscle spasms      dextromethorphan (DELSYM) 30 MG/5ML liquid Take 10 mLs (60 mg) by mouth 2 times daily as needed for cough 178 mL 0    diclofenac (VOLTAREN) 1 % topical gel Apply 4 g topically 4 times daily 350 g 3    docusate sodium (COLACE) 100 MG capsule Take 2 capsules (200 mg) by mouth 2 times daily as needed for constipation 90 capsule 3    FLUoxetine (PROZAC) 10 MG capsule TAKE 1 CAPSULE (10 MG) BY MOUTH DAILY 90 capsule 2    fluticasone-vilanterol (BREO ELLIPTA) 200-25 MCG/ACT inhaler Inhale 1 puff into the lungs daily 60 each 11    gabapentin (NEURONTIN) 300 MG capsule Take 2 capsules (600 mg) by mouth 3 times daily 540 capsule 3    galcanezumab-gnlm (EMGALITY) 120 MG/ML injection Inject 2 mLs (240 mg) Subcutaneous every 28 days Loading dose. 2 mL 0    hydrochlorothiazide (MICROZIDE) 12.5 MG capsule TAKE 1 CAPSULE (12.5 MG TOTAL) BY MOUTH DAILY FOR BLOOD PRESSURE 90 capsule 2    hydrocortisone (CORTAID) 1 % external cream Apply topically 2 times daily 60 g 0    ibuprofen (ADVIL/MOTRIN) 600 MG tablet Take 600 mg by mouth every 6 hours as needed for moderate pain      insulin aspart (NOVOLOG PEN) 100 UNIT/ML pen Inject 16 Units Subcutaneous 2 times daily (with meals) 15 mL 3    insulin glargine (LANTUS PEN) 100 UNIT/ML pen Inject 44 Units Subcutaneous every morning 45 mL 3    insulin pen needle (32G X 4 MM) 32G X 4 MM miscellaneous Use one pen needles daily or as directed. 200 each 11    loratadine (CLARITIN) 10 MG tablet Take 10 mg by mouth daily      meclizine (ANTIVERT) 12.5 MG tablet TAKE 2 TABLETS (25 MG) BY MOUTH 3 TIMES DAILY AS NEEDED FOR DIZZINESS 90 tablet 0    metFORMIN (GLUCOPHAGE XR) 500 MG 24 hr tablet Take 1 tablet (500 mg) by mouth 2 times daily (with meals) 180 tablet 3    metoprolol tartrate (LOPRESSOR) 25 MG tablet TAKE 1 TABLET (25 MG TOTAL) BY MOUTH 2 (TWO) TIMES A DAY FOR BLOOD PRESSURE 180 tablet 3    montelukast (SINGULAIR) 10 MG tablet Take 1 tablet (10 mg) by  mouth At Bedtime 30 tablet 11    nystatin (MYCOSTATIN) 881390 UNIT/ML suspension Take 1,000,000 Units by mouth 4 times daily      omeprazole (PRILOSEC) 40 MG DR capsule Take 1 capsule (40 mg) by mouth daily 90 capsule 3    Polyethylene Glycol 3350 (PEG 3350) 17 GM/SCOOP POWD MIX 17 GRAMS WITH LIQUID AND DRINK ONCE DAILY FOR CONSTIPATION 1530 g 3    sucralfate (CARAFATE) 1 GM/10ML suspension Take 10 mLs (1 g) by mouth 4 times daily 3600 mL 1    tiotropium (SPIRIVA RESPIMAT) 2.5 MCG/ACT inhaler Inhale 2 puffs into the lungs daily 4 g 11    dupilumab (DUPIXENT) 300 MG/2ML prefilled pen Inject 2 mLs (300 mg) Subcutaneous every 14 days (Patient not taking: Reported on 11/7/2023) 4 mL 5    galcanezumab-gnlm (EMGALITY) 120 MG/ML injection Inject 1 mL (120 mg) Subcutaneous every 28 days (Patient not taking: Reported on 11/8/2023) 1 mL 3     No Known Allergies       Lab Results    Chemistry/lipid CBC Cardiac Enzymes/BNP/TSH/INR   Recent Labs   Lab Test 03/14/23  1140   CHOL 125   HDL 41*   LDL 41   TRIG 214*     Recent Labs   Lab Test 03/14/23  1140 02/16/23  1137 03/25/22  1200   LDL 41 45 54     Recent Labs   Lab Test 10/01/23  0942      POTASSIUM 4.3   CHLORIDE 102   CO2 22   *   BUN 9.2   CR 0.53   GFRESTIMATED >90   FREDY 9.1     Recent Labs   Lab Test 10/01/23  0942 07/25/23  0710 07/24/23  1644   CR 0.53 0.50* 0.60     Recent Labs   Lab Test 11/07/23  1046 07/10/23  0845 03/14/23  1140   A1C 11.1* 7.9* 7.4*          Recent Labs   Lab Test 10/01/23  0942   WBC 7.2   HGB 11.6*   HCT 38.4   MCV 81        Recent Labs   Lab Test 10/01/23  0942 08/24/23  1103 08/08/23  0956   HGB 11.6* 11.3* 12.0    Recent Labs   Lab Test 09/06/22  0934 07/11/22  2340 07/11/22 2018   TROPONINI 0.02 <0.01 <0.01     Recent Labs   Lab Test 07/24/23  1644 07/11/22 2018 07/02/22  1929 01/03/22  1628   BNP  --  15 12 <10   NTBNPI <36  --   --   --      Recent Labs   Lab Test 07/14/22  1138   TSH 0.59     Recent Labs   Lab Test  09/06/22  0934 07/02/22  1929 05/27/21  1107   INR 1.02 1.03 0.98        Audrey Arceo PA-C

## 2023-11-08 ENCOUNTER — OFFICE VISIT (OUTPATIENT)
Dept: CARDIOLOGY | Facility: CLINIC | Age: 53
End: 2023-11-08
Payer: COMMERCIAL

## 2023-11-08 VITALS
WEIGHT: 122 LBS | OXYGEN SATURATION: 98 % | RESPIRATION RATE: 24 BRPM | BODY MASS INDEX: 25.5 KG/M2 | SYSTOLIC BLOOD PRESSURE: 116 MMHG | HEART RATE: 99 BPM | DIASTOLIC BLOOD PRESSURE: 68 MMHG

## 2023-11-08 DIAGNOSIS — R07.9 CHEST PAIN, UNSPECIFIED TYPE: ICD-10-CM

## 2023-11-08 DIAGNOSIS — Z79.4 TYPE 2 DIABETES MELLITUS TREATED WITH INSULIN (H): ICD-10-CM

## 2023-11-08 DIAGNOSIS — I25.83 CORONARY ARTERY DISEASE DUE TO LIPID RICH PLAQUE: Primary | Chronic | ICD-10-CM

## 2023-11-08 DIAGNOSIS — G47.33 OSA (OBSTRUCTIVE SLEEP APNEA): Chronic | ICD-10-CM

## 2023-11-08 DIAGNOSIS — I47.10 PAROXYSMAL SUPRAVENTRICULAR TACHYCARDIA (H): ICD-10-CM

## 2023-11-08 DIAGNOSIS — E11.9 TYPE 2 DIABETES MELLITUS TREATED WITH INSULIN (H): ICD-10-CM

## 2023-11-08 DIAGNOSIS — I10 ESSENTIAL HYPERTENSION: ICD-10-CM

## 2023-11-08 DIAGNOSIS — E78.5 HYPERLIPIDEMIA, UNSPECIFIED HYPERLIPIDEMIA TYPE: Chronic | ICD-10-CM

## 2023-11-08 DIAGNOSIS — I25.10 CORONARY ARTERY DISEASE DUE TO LIPID RICH PLAQUE: Primary | Chronic | ICD-10-CM

## 2023-11-08 LAB
ALBUMIN SERPL ELPH-MCNC: 3.8 G/DL (ref 3.7–5.1)
ALPHA1 GLOB SERPL ELPH-MCNC: 0.3 G/DL (ref 0.2–0.4)
ALPHA2 GLOB SERPL ELPH-MCNC: 0.7 G/DL (ref 0.5–0.9)
B-GLOBULIN SERPL ELPH-MCNC: 0.9 G/DL (ref 0.6–1)
GAMMA GLOB SERPL ELPH-MCNC: 1 G/DL (ref 0.7–1.6)
M PROTEIN SERPL ELPH-MCNC: 0 G/DL
PROT PATTERN SERPL ELPH-IMP: NORMAL
PROT PATTERN SERPL IFE-IMP: NORMAL

## 2023-11-08 PROCEDURE — 99214 OFFICE O/P EST MOD 30 MIN: CPT | Performed by: STUDENT IN AN ORGANIZED HEALTH CARE EDUCATION/TRAINING PROGRAM

## 2023-11-08 RX ORDER — COLCHICINE 0.6 MG/1
0.6 TABLET ORAL DAILY
Qty: 90 TABLET | Refills: 3 | Status: SHIPPED | OUTPATIENT
Start: 2023-11-08 | End: 2023-12-14

## 2023-11-08 NOTE — PATIENT INSTRUCTIONS
Kulwant Amin,    It was a pleasure to see you today at the [facility].     My recommendations after this visit include:    - Continue current medications  - If chest pain starts worsening, let us know  - Follow up with Dr. Hirsch in 6 months    - Please call [phone], if you have any questions or concerns      Audrey Arceo PA-C

## 2023-11-08 NOTE — LETTER
11/8/2023    Adrien Cardoza MD  [address] [place] [address] 1  [place] [address]    RE: Kulwant PAEZ Eloisa       Dear Colleague,     I had the pleasure of seeing Kulwant Amni in the [facility].    HEART CARE ENCOUNTER NOTE      JEANNINE [facility]  [phone]      Assessment/Recommendations   Assessment:   Coronary artery disease: status post drug-eluting stenting x1 to the [id number] left anterior descending artery 1/25/2018. Her last coronary angiogram 5/5/2022 showed a patent stent and no new obstructive lesions. Pharmacologic nuclear stress testing 4/17/2023 showed no clear evidence of ischemia.   Supraventricular tachycardia: This has been noted on outside telemetry strips but does not appear to have ever been captured on any resting 12-lead electrocardiogram or ambulatory cardiac monitor in our system.  Patient had cardiac event monitor October 2022 that did not show any SVT.  Patient currently notes feeling the occasional heart racing during her illness.  Heart rate is currently 99 in clinic but suspect this is related to patient's illness as she appears ill in clinic and is coughing and taking deep breaths.   Hypertension: well controlled on hydrochlorothiazide 12.5 mg daily, metoprolol 25 mg twice daily  Hyperlipidemia: well controlled on atorvastatin 80 mg daily. Last LDL 3/14/23 was 41 mg/dL.  Mild obstructive sleep apnea: noted on polysomnography 4/1/2022.  Diabetes mellitus: on insulin, metformin  History of right pontine CVA: 2021. Follows with neurology. Has vertigo and frequent headaches. Recently started emgality. Helping a little with the headaches.     Plan:   Continue current medications.  Refilled colchicine for the chest pain related to her pericarditis.  Reassured chest pain is improving.  If chest pain starts to worsen or shortness of breath does not improve with resolution of illness should let me know  Follow-up in 6 months with Dr. Hirsch, sooner if  needed.      The level of medical decision making during this visit was of moderate complexity.    Follow up in 6 months with Dr. Hirsch.      History of Present Illness/Subjective    HPI: Kulwant Amin is a 53 year old female with PMHx of CAD s/p VINICIO x1 to the proximal-mid LAD 1/25/2018, right pontine CVA 1/13/2021, severe obstructive lung disease, SVT, mild FLACO and HLD presents for follow up.    She was hospitalized 7/24/2023 - 7/26/2023 with chest pain diagnosed as acute pericarditis. Troponins and BNP was normal. ECG showed subtle ST elevations. CRP was elevated at 11. She was started on colchicine 0.6 mg twice daily for a month then had the dose reduced to 0.6 mg just once daily.     Today patient notes she has been sick for the past 3 days with cough fever and wheezing.  Patient is noting difficulty breathing.  O2 on exam is 97%.  Patient is going to the ER/urgent care from this visit for her illness.      Patient notes that she is still having some mild chest discomfort from her pericarditis though it is improving.  Is currently having shortness of breath but notes this is related to her illness.  She does note her heart is felt as if it was racing since she got sick.  Notes she will occasionally have short sensations of heart racing but that it does not last long.   She denies lightheadedness, orthopnea, PND, abdominal fullness/bloating, and lower extremity edema.        Echocardiogram 7/25/23 Results:  The left ventricle is normal in size.  Left ventricular function is normal.The ejection fraction is 55-60%.  No hemodynamically significant valvular abnormalities on 2D or color flow  imaging.    Nuclear stress test 4/17/23:    The nuclear stress test is negative for inducible myocardial ischemia or infarction.    The patient is at a low risk of future cardiac ischemic events.    Left ventricular function is hyperdynamic.    The left ventricular ejection fraction at stress is greater than 70%.    A prior  study was conducted on 1/22/2019.  This study has changes noted when compared with the prior study. There is now evidence of transient ischemic dilation.    Stress to rest cavity ratio is 1.38.  TID is present quantitatively but not visually.  This is a nonspecific finding that can indicate the presence of subendocardial ischemia and clinical correlation recommended.     Cardiac event monitor 10/19/22:  Indication for study: Supraventricular tachycardia  Time monitored: 11 days 23 hours.    The baseline rhythm transmission demonstrated normal sinus rhythm with heart rates in the 90s. The TX interval, QRS duration, and QT interval, all appear to be normal.  The patient had 2 manually activated rhythm recordings.  Symptoms were reported as heart racing.  The patient's rhythm demonstrated normal sinus rhythm with heart rates ranging between 85 and 96 bpm.  The patient had 8 auto triggered recordings.  Symptoms were not reported.  The patient's rhythm demonstrated normal sinus rhythm with heart rates ranging between 91 and 116 bpm.    Essentially normal multi day patient activated monitor.  Patient's symptomatic recordings correlated with normal sinus rhythm.  No sustained atrial or ventricular tachyarrhythmia.  No profound bradycardia or significant pauses.     Physical Examination  Review of Systems   Vitals: /68 (BP Location: Left arm, Patient Position: Sitting, Cuff Size: Adult Regular)   Pulse 99   Resp 24   Wt 55.3 kg (122 lb)   SpO2 98%   BMI 25.50 kg/m    BMI= Body mass index is 25.5 kg/m .  Wt Readings from Last 3 Encounters:   11/08/23 55.3 kg (122 lb)   11/07/23 56 kg (123 lb 8 oz)   10/26/23 55.3 kg (122 lb)           ENT/Mouth: membranes moist, no oral lesions or bleeding gums.      EYES:  no scleral icterus, normal conjunctivae                    Neck: No thyromegaly   Chest/Lungs:   Lungs-rhonchi heard throughout, no rales or wheezing, equal chest wall expansion    Cardiovascular:   Regular.  Normal first and second heart sounds with no murmurs, rubs, or gallops; the carotid, radial and posterior tibial pulses are intact, Jugular venous pressure normal, no edema bilaterally        Extremities: no cyanosis or clubbing   Skin: no xanthelasma, warm.    Neurologic: no tremors     Psychiatric: alert and oriented x3, calm        Please refer above for cardiac ROS details.        Medical History  Surgical History Family History Social History   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    Microcytic anemia 11/17/2019    S/P coronary artery stent placement 02/02/2018    TB lung, latent     9 mos INH     Past Surgical History:   Procedure Laterality Date    CORONARY STENT PLACEMENT  2018    CV CORONARY ANGIOGRAM N/A 1/25/2018    Procedure: Coronary Angiogram;  Surgeon: Angelo Serrano MD;  Location: [facility] Cath Lab;  Service:     CV CORONARY ANGIOGRAM N/A 5/5/2022    Procedure: Coronary Angiogram;  Surgeon: Irwin Cano MD;  Location: [facility] CATH LAB CV    CV CORONARY ANGIOGRAM  5/5/2022    Procedure: ;  Surgeon: Irwin Cano MD;  Location: [facility] CATH [facility] CV    CT ESOPHAGOGASTRODUODENOSCOPY TRANSORAL DIAGNOSTIC N/A 12/10/2018    Procedure: ESOPHAGOGASTRODUODENOSCOPY (EGD);  Surgeon: Eddie Renteria MD;  Location: [facility];  Service: Gastroenterology    CT ESOPHAGOGASTRODUODENOSCOPY TRANSORAL DIAGNOSTIC N/A 12/3/2020    Procedure: ESOPHAGOGASTRODUODENOSCOPY (EGD) with  biospies ;  Surgeon: Avi Crow MD;  Location: [facility];  Service: Gastroenterology    [facility] COLONOSCOPY W/WO BRUSH/WASH N/A 12/10/2018    Procedure: COLONOSCOPY with polypectomy using biopsy forceps;  Surgeon: Eddie Renteria MD;  Location: [facility];  Service: Gastroenterology     Family History   Problem Relation Age of Onset    Other - See Comments Mother          of an intestinal problem    Ulcers Father          of gastritis    Breast Cancer No family hx of         Social History     Socioeconomic History    Marital status:      Spouse name: Not on file    Number of children: Not on file    Years of education: Not on file    Highest education level: Not on file   Occupational History    Not on file   Tobacco Use    Smoking status: Former     Packs/day: 1.00     Years: 30.00     Additional pack years: 0.00     Total pack years: 30.00     Types: Cigarettes     Quit date: 2003     Years since quittin.8     Passive exposure: Never    Smokeless tobacco: Never    Tobacco comments:     No passive exposure   Vaping Use    Vaping Use: Never used   Substance and Sexual Activity    Alcohol use: No    Drug use: No    Sexual activity: Yes     Partners: Male   Other Topics Concern    Parent/sibling w/ CABG, MI or angioplasty before 65F 55M? Not Asked   Social History Narrative    2017 The patient lives with her daughter-in-law (who is present), , son, and 2 grandchildren (total of 6 people). Immigrant.     Social Determinants of Health     Financial Resource Strain: Low Risk  (10/5/2023)    Financial Resource Strain     Within the past 12 months, have you or your family members you live with been unable to get utilities (heat, electricity) when it was really needed?: No   Food Insecurity: Low Risk  (10/5/2023)    Food Insecurity     Within the past 12 months, did you worry that your food would run out before you got money to buy more?: No     Within the past 12 months, did  the food you bought just not last and you didn t have money to get more?: No   Transportation Needs: Low Risk  (10/5/2023)    Transportation Needs     Within the past 12 months, has lack of transportation kept you from medical appointments, getting your medicines, non-medical meetings or appointments, work, or from getting things that you need?: No   Physical Activity: Not on file   Stress: Not on file   Social Connections: Not on file   Interpersonal Safety: Not on file   Housing Stability: Low Risk  (10/5/2023)    Housing Stability     Do you have housing? : Yes     Are you worried about losing your housing?: No           Medications  Allergies   Current Outpatient Medications   Medication Sig Dispense Refill    acetaminophen (TYLENOL) 500 MG tablet TAKE 1 PILL BY MOUTH EVERY 6 HOURS AS NEEDED FOR PAIN 100 tablet 10    albuterol (PROAIR HFA/PROVENTIL HFA/VENTOLIN HFA) 108 (90 Base) MCG/ACT inhaler Inhale 2 puffs into the lungs every 4 hours as needed for shortness of breath 18 g 11    albuterol (PROVENTIL) (2.5 MG/3ML) 0.083% neb solution Take 1 vial (2.5 mg) by nebulization every 6 hours as needed for shortness of breath, wheezing or cough 240 mL 11    amitriptyline (ELAVIL) 50 MG tablet Take 1 tablet (50 mg) by mouth At Bedtime 90 tablet 1    ASPIRIN LOW DOSE 81 MG EC tablet TAKE 1 TABLET (81 MG TOTAL) BY MOUTH DAILY. 90 tablet 3    atorvastatin (LIPITOR) 80 MG tablet TAKE 1 TABLET (80 MG TOTAL) BY MOUTH AT BEDTIME FOR CHOLESTEROL 90 tablet 0    B-D U/F 31G X 8 MM insulin pen needle USE ONE PEN NEEDLE DAILY AS NEEDED FOR  each 1    benzonatate (TESSALON) 100 MG capsule Take 1 capsule (100 mg) by mouth 2 times daily as needed for cough 100 capsule 1    calcium carbonate (TUMS) 500 MG chewable tablet Take 1 tablet (500 mg) by mouth 2 times daily as needed for heartburn 30 tablet 0    colchicine (COLCRYS) 0.6 MG tablet Take 1 tablet (0.6 mg) by mouth daily 90 tablet 3    Continuous Blood Gluc Sensor  (FREESTYLE MONICA 2 SENSOR) MISC 1 EACH EVERY 14 DAYS USE 1 SENSOR EVERY 14 DAYS. USE TO READ BLOOD SUGARS PER 'S INSTRUCTIONS. 2 each 11    cyclobenzaprine (FLEXERIL) 5 MG tablet Take 5 mg by mouth 3 times daily as needed for muscle spasms      dextromethorphan (DELSYM) 30 MG/5ML liquid Take 10 mLs (60 mg) by mouth 2 times daily as needed for cough 178 mL 0    diclofenac (VOLTAREN) 1 % topical gel Apply 4 g topically 4 times daily 350 g 3    docusate sodium (COLACE) 100 MG capsule Take 2 capsules (200 mg) by mouth 2 times daily as needed for constipation 90 capsule 3    FLUoxetine (PROZAC) 10 MG capsule TAKE 1 CAPSULE (10 MG) BY MOUTH DAILY 90 capsule 2    fluticasone-vilanterol (BREO ELLIPTA) 200-25 MCG/ACT inhaler Inhale 1 puff into the lungs daily 60 each 11    gabapentin (NEURONTIN) 300 MG capsule Take 2 capsules (600 mg) by mouth 3 times daily 540 capsule 3    galcanezumab-gnlm (EMGALITY) 120 MG/ML injection Inject 2 mLs (240 mg) Subcutaneous every 28 days Loading dose. 2 mL 0    hydrochlorothiazide (MICROZIDE) 12.5 MG capsule TAKE 1 CAPSULE (12.5 MG TOTAL) BY MOUTH DAILY FOR BLOOD PRESSURE 90 capsule 2    hydrocortisone (CORTAID) 1 % external cream Apply topically 2 times daily 60 g 0    ibuprofen (ADVIL/MOTRIN) 600 MG tablet Take 600 mg by mouth every 6 hours as needed for moderate pain      insulin aspart (NOVOLOG PEN) 100 UNIT/ML pen Inject 16 Units Subcutaneous 2 times daily (with meals) 15 mL 3    insulin glargine (LANTUS PEN) 100 UNIT/ML pen Inject 44 Units Subcutaneous every morning 45 mL 3    insulin pen needle (32G X 4 MM) 32G X 4 MM miscellaneous Use one pen needles daily or as directed. 200 each 11    loratadine (CLARITIN) 10 MG tablet Take 10 mg by mouth daily      meclizine (ANTIVERT) 12.5 MG tablet TAKE 2 TABLETS (25 MG) BY MOUTH 3 TIMES DAILY AS NEEDED FOR DIZZINESS 90 tablet 0    metFORMIN (GLUCOPHAGE XR) 500 MG 24 hr tablet Take 1 tablet (500 mg) by mouth 2 times daily (with meals)  180 tablet 3    metoprolol tartrate (LOPRESSOR) 25 MG tablet TAKE 1 TABLET (25 MG TOTAL) BY MOUTH 2 (TWO) TIMES A DAY FOR BLOOD PRESSURE 180 tablet 3    montelukast (SINGULAIR) 10 MG tablet Take 1 tablet (10 mg) by mouth At Bedtime 30 tablet 11    nystatin (MYCOSTATIN) 066390 UNIT/ML suspension Take 1,000,000 Units by mouth 4 times daily      omeprazole (PRILOSEC) 40 MG DR capsule Take 1 capsule (40 mg) by mouth daily 90 capsule 3    Polyethylene Glycol 3350 (PEG 3350) 17 GM/SCOOP POWD MIX 17 GRAMS WITH LIQUID AND DRINK ONCE DAILY FOR CONSTIPATION 1530 g 3    sucralfate (CARAFATE) 1 GM/10ML suspension Take 10 mLs (1 g) by mouth 4 times daily 3600 mL 1    tiotropium (SPIRIVA RESPIMAT) 2.5 MCG/ACT inhaler Inhale 2 puffs into the lungs daily 4 g 11    dupilumab (DUPIXENT) 300 MG/2ML prefilled pen Inject 2 mLs (300 mg) Subcutaneous every 14 days (Patient not taking: Reported on 11/7/2023) 4 mL 5    galcanezumab-gnlm (EMGALITY) 120 MG/ML injection Inject 1 mL (120 mg) Subcutaneous every 28 days (Patient not taking: Reported on 11/8/2023) 1 mL 3     No Known Allergies       Lab Results    Chemistry/lipid CBC Cardiac Enzymes/BNP/TSH/INR   Recent Labs   Lab Test 03/14/23  1140   CHOL 125   HDL 41*   LDL 41   TRIG 214*     Recent Labs   Lab Test 03/14/23  1140 02/16/23  1137 03/25/22  1200   LDL 41 45 54     Recent Labs   Lab Test 10/01/23  0942      POTASSIUM 4.3   CHLORIDE 102   CO2 22   *   BUN 9.2   CR 0.53   GFRESTIMATED >90   FREDY 9.1     Recent Labs   Lab Test 10/01/23  0942 07/25/23  0710 07/24/23  1644   CR 0.53 0.50* 0.60     Recent Labs   Lab Test 11/07/23  1046 07/10/23  0845 03/14/23  1140   A1C 11.1* 7.9* 7.4*          Recent Labs   Lab Test 10/01/23  0942   WBC 7.2   HGB 11.6*   HCT 38.4   MCV 81        Recent Labs   Lab Test 10/01/23  0942 08/24/23  1103 08/08/23  0956   HGB 11.6* 11.3* 12.0    Recent Labs   Lab Test 09/06/22  0934 07/11/22  2340 07/11/22 2018   TROPONINI 0.02 <0.01 <0.01      Recent Labs   Lab Test 07/24/23  1644 07/11/22  2018 07/02/22 1929 01/03/22  1628   BNP  --  15 12 <10   NTBNPI <36  --   --   --      Recent Labs   Lab Test 07/14/22  1138   TSH 0.59     Recent Labs   Lab Test 09/06/22  0934 07/02/22 1929 05/27/21  1107   INR 1.02 1.03 0.98        Audrey Arceo PA-C            Thank you for allowing me to participate in the care of your patient.      Sincerely,     Audrey Arceo PA-C     [facility]  cc:   No referring provider defined for this encounter.

## 2023-11-09 ENCOUNTER — ALLIED HEALTH/NURSE VISIT (OUTPATIENT)
Dept: ALLERGY | Facility: CLINIC | Age: 53
End: 2023-11-09
Payer: COMMERCIAL

## 2023-11-09 DIAGNOSIS — J45.50 SEVERE PERSISTENT ASTHMA WITHOUT COMPLICATION (H): Primary | ICD-10-CM

## 2023-11-09 PROCEDURE — 96372 THER/PROPH/DIAG INJ SC/IM: CPT | Performed by: ALLERGY & IMMUNOLOGY

## 2023-11-11 LAB — VIT B1 PYROPHOSHATE BLD-SCNC: 111 NMOL/L

## 2023-11-15 ENCOUNTER — TELEPHONE (OUTPATIENT)
Dept: NEUROLOGY | Facility: CLINIC | Age: 53
End: 2023-11-15
Payer: COMMERCIAL

## 2023-11-15 LAB
ANA SER QL: NEGATIVE
ANNOTATION COMMENT IMP: NORMAL
EJ AB SER QL: NEGATIVE
ENA JO1 IGG SER-ACNC: 1 AU/ML
MDA5 AB SER QL LINE BLOT: NEGATIVE
MI2 AB SER QL: NEGATIVE
MJ AB SER QL LINE BLOT: NEGATIVE
OJ AB SER QL: NEGATIVE
PL12 AB SER QL: NEGATIVE
PL7 AB SER QL: NEGATIVE
SAE1 AB SER QL LINE BLOT: NEGATIVE
SRP AB SERPL QL: NEGATIVE
TIF1-GAMMA AB SER QL LINE BLOT: NEGATIVE

## 2023-11-15 NOTE — LETTER
December 1, 2023      Kulwant Amin  [address] [address]        Dear Kulwant,     You recently called into our Neurology Clinic about concerns related to side effects. We've been unable to reach you by phone.     Here is some information per the MD:    Guerrero Prescott MD Physician     1.  The worsening pain/stiffness would not be related to the Emgality.  Is not a common side effect.  We could always try stopping the Emgality and see if her symptoms improve.    2.  For the stroke she needs either aspirin 81 mg or Plavix 75 mg.  There is no indication for her to be on both.  I am not sure why she is on both medications.  Possibly due to cardiac reasons?      If you have continued concerns call [phone] to reach our triage nurse team.      Sincerely,    [facility]        Sincerely,        Guerrero Prescott MD

## 2023-11-15 NOTE — TELEPHONE ENCOUNTER
===View-only below this line===  ----- Message -----  From: Guerrero Prescott MD  Sent: 11/15/2023   8:51 AM CST  To: Amie Calix MA    Can you check with patient.   ----- Message -----  From: Malu Gil, PharmD  Sent: 11/7/2023  12:43 PM CST  To: MD Dr. Genie Novoa,  I met with the patient today.     1. She states that she started the Emgality recently, though since then has noticed a worsening pain/stiffness in her lower back. Can you follow up with her about this whether this is something to expect or if something needs to change based on this reaction?    2.  Additionally I was a bit confused when reading your most recent note about whether or not patient should currently be taking aspirin (81 vs 325 mg) and clopidogrel (or not)? Right now patient taking aspirin 81 mg daily only. At one point appears Dr Multani recommended aspirin 325 mg + clopidogrel. Please clarify.     Thank you  Malu Gil, PharmD, BCACP  Medication Therapy Management Pharmacist

## 2023-11-16 NOTE — TELEPHONE ENCOUNTER
1.  The worsening pain/stiffness would not be related to the Emgality.  Is not a common side effect.  We could always try stopping the Emgality and see if her symptoms improve.  2.  For the stroke she needs either aspirin 81 mg or Plavix 75 mg.  There is no indication for her to be on both.  I am not sure why she is on both medications.  Possibly due to cardiac reasons?

## 2023-11-16 NOTE — TELEPHONE ENCOUNTER
Called via language line, though unable to reach patient. Message left for patient per , encouraging call back to discuss if lower back paint/stiffness has resolved.    MD to please advise on two concerns from original MTM provider notation.    1. She states that she started the Emgality recently, though since then has noticed a worsening pain/stiffness in her lower back. Can you follow up with her about this whether this is something to expect or if something needs to change based on this reaction? Would having these symptoms potentially be a side effect of this medication, and when should patient consider different options.    2.  Additionally I was a bit confused when reading your most recent note about whether or not patient should currently be taking aspirin (81 vs 325 mg) and clopidogrel (or not)? Right now patient taking aspirin 81 mg daily only. At one point appears Dr Multani recommended aspirin 325 mg + clopidogrel. Please clarify. Need to determine the appropriate anticoagulation medication management.    Thank you for you insights.  Jero Goyal RN, BSN  [facility]

## 2023-11-21 NOTE — TELEPHONE ENCOUNTER
Called and left message for patient's son, requested a call back to clinic to discuss information provided in previous message to patient.    Jero Goyal RN, BSN  [facility]

## 2023-11-21 NOTE — TELEPHONE ENCOUNTER
[facility] Call Center    Phone Message    May a detailed message be left on voicemail: yes     Reason for Call: Other: Patient's son Rhys returning missed call relating to previous TE.     Please reach out to further advise  # [phone]    Action Taken: Other: Neurology    Travel Screening: Not Applicable

## 2023-11-26 ENCOUNTER — APPOINTMENT (OUTPATIENT)
Dept: RADIOLOGY | Facility: HOSPITAL | Age: 53
End: 2023-11-26
Payer: COMMERCIAL

## 2023-11-26 ENCOUNTER — HOSPITAL ENCOUNTER (EMERGENCY)
Facility: HOSPITAL | Age: 53
Discharge: HOME | End: 2023-11-26
Attending: EMERGENCY MEDICINE | Admitting: EMERGENCY MEDICINE
Payer: COMMERCIAL

## 2023-11-26 VITALS
RESPIRATION RATE: 25 BRPM | HEART RATE: 101 BPM | TEMPERATURE: 98.2 F | WEIGHT: 121.91 LBS | SYSTOLIC BLOOD PRESSURE: 126 MMHG | DIASTOLIC BLOOD PRESSURE: 65 MMHG | HEIGHT: 63 IN | BODY MASS INDEX: 21.6 KG/M2 | OXYGEN SATURATION: 98 %

## 2023-11-26 DIAGNOSIS — J45.901 SEVERE ASTHMA WITH EXACERBATION, UNSPECIFIED WHETHER PERSISTENT: ICD-10-CM

## 2023-11-26 LAB
ANION GAP SERPL CALCULATED.3IONS-SCNC: 11 MMOL/L (ref 7–15)
BASOPHILS # BLD AUTO: 0 10E3/UL (ref 0–0.2)
BASOPHILS NFR BLD AUTO: 1 %
BUN SERPL-MCNC: 10.8 MG/DL (ref 6–20)
CALCIUM SERPL-MCNC: 9.6 MG/DL (ref 8.6–10)
CHLORIDE SERPL-SCNC: 102 MMOL/L (ref 98–107)
CREAT SERPL-MCNC: 0.56 MG/DL (ref 0.51–0.95)
D DIMER PPP FEU-MCNC: 0.41 UG/ML FEU (ref 0–0.5)
DEPRECATED HCO3 PLAS-SCNC: 28 MMOL/L (ref 22–29)
EGFRCR SERPLBLD CKD-EPI 2021: >90 ML/MIN/1.73M2
EOSINOPHIL # BLD AUTO: 0.4 10E3/UL (ref 0–0.7)
EOSINOPHIL NFR BLD AUTO: 6 %
ERYTHROCYTE [DISTWIDTH] IN BLOOD BY AUTOMATED COUNT: 14.6 % (ref 10–15)
FLUAV RNA SPEC QL NAA+PROBE: NEGATIVE
FLUBV RNA RESP QL NAA+PROBE: NEGATIVE
GLUCOSE SERPL-MCNC: 135 MG/DL (ref 70–99)
HCT VFR BLD AUTO: 39.3 % (ref 35–47)
HGB BLD-MCNC: 12.1 G/DL (ref 11.7–15.7)
IMM GRANULOCYTES # BLD: 0 10E3/UL
IMM GRANULOCYTES NFR BLD: 0 %
LYMPHOCYTES # BLD AUTO: 2.1 10E3/UL (ref 0.8–5.3)
LYMPHOCYTES NFR BLD AUTO: 32 %
MCH RBC QN AUTO: 24.5 PG (ref 26.5–33)
MCHC RBC AUTO-ENTMCNC: 30.8 G/DL (ref 31.5–36.5)
MCV RBC AUTO: 80 FL (ref 78–100)
MONOCYTES # BLD AUTO: 0.6 10E3/UL (ref 0–1.3)
MONOCYTES NFR BLD AUTO: 9 %
NEUTROPHILS # BLD AUTO: 3.4 10E3/UL (ref 1.6–8.3)
NEUTROPHILS NFR BLD AUTO: 52 %
NRBC # BLD AUTO: 0 10E3/UL
NRBC BLD AUTO-RTO: 0 /100
PLATELET # BLD AUTO: 196 10E3/UL (ref 150–450)
POTASSIUM SERPL-SCNC: 4.3 MMOL/L (ref 3.4–5.3)
RBC # BLD AUTO: 4.94 10E6/UL (ref 3.8–5.2)
RSV RNA SPEC NAA+PROBE: NEGATIVE
SARS-COV-2 RNA RESP QL NAA+PROBE: NEGATIVE
SODIUM SERPL-SCNC: 141 MMOL/L (ref 135–145)
TROPONIN T SERPL HS-MCNC: <6 NG/L
WBC # BLD AUTO: 6.6 10E3/UL (ref 4–11)

## 2023-11-26 PROCEDURE — 84484 ASSAY OF TROPONIN QUANT: CPT

## 2023-11-26 PROCEDURE — 93005 ELECTROCARDIOGRAM TRACING: CPT | Performed by: EMERGENCY MEDICINE

## 2023-11-26 PROCEDURE — 250N000013 HC RX MED GY IP 250 OP 250 PS 637

## 2023-11-26 PROCEDURE — 87637 SARSCOV2&INF A&B&RSV AMP PRB: CPT

## 2023-11-26 PROCEDURE — 85379 FIBRIN DEGRADATION QUANT: CPT

## 2023-11-26 PROCEDURE — 999N000157 HC STATISTIC RCP TIME EA 10 MIN

## 2023-11-26 PROCEDURE — 80048 BASIC METABOLIC PNL TOTAL CA: CPT

## 2023-11-26 PROCEDURE — 99285 EMERGENCY DEPT VISIT HI MDM: CPT | Mod: 25

## 2023-11-26 PROCEDURE — 85025 COMPLETE CBC W/AUTO DIFF WBC: CPT

## 2023-11-26 PROCEDURE — 71046 X-RAY EXAM CHEST 2 VIEWS: CPT

## 2023-11-26 PROCEDURE — 96374 THER/PROPH/DIAG INJ IV PUSH: CPT

## 2023-11-26 PROCEDURE — 94640 AIRWAY INHALATION TREATMENT: CPT

## 2023-11-26 PROCEDURE — 250N000011 HC RX IP 250 OP 636: Mod: JZ

## 2023-11-26 PROCEDURE — 36415 COLL VENOUS BLD VENIPUNCTURE: CPT

## 2023-11-26 PROCEDURE — 250N000009 HC RX 250

## 2023-11-26 RX ORDER — ACETAMINOPHEN 325 MG/1
975 TABLET ORAL ONCE
Status: COMPLETED | OUTPATIENT
Start: 2023-11-26 | End: 2023-11-26

## 2023-11-26 RX ORDER — BENZONATATE 200 MG/1
200 CAPSULE ORAL 3 TIMES DAILY PRN
Qty: 30 CAPSULE | Refills: 0 | Status: SHIPPED | OUTPATIENT
Start: 2023-11-26 | End: 2023-12-06

## 2023-11-26 RX ORDER — IPRATROPIUM BROMIDE AND ALBUTEROL SULFATE 2.5; .5 MG/3ML; MG/3ML
3 SOLUTION RESPIRATORY (INHALATION) ONCE
Status: COMPLETED | OUTPATIENT
Start: 2023-11-26 | End: 2023-11-26

## 2023-11-26 RX ORDER — METHYLPREDNISOLONE SODIUM SUCCINATE 125 MG/2ML
125 INJECTION, POWDER, LYOPHILIZED, FOR SOLUTION INTRAMUSCULAR; INTRAVENOUS ONCE
Status: COMPLETED | OUTPATIENT
Start: 2023-11-26 | End: 2023-11-26

## 2023-11-26 RX ORDER — PREDNISONE 20 MG/1
TABLET ORAL
Qty: 17 TABLET | Refills: 0 | Status: SHIPPED | OUTPATIENT
Start: 2023-11-26 | End: 2023-12-07

## 2023-11-26 RX ADMIN — METHYLPREDNISOLONE SODIUM SUCCINATE 125 MG: 125 INJECTION, POWDER, FOR SOLUTION INTRAMUSCULAR; INTRAVENOUS at 10:34

## 2023-11-26 RX ADMIN — IPRATROPIUM BROMIDE AND ALBUTEROL SULFATE 3 ML: .5; 3 SOLUTION RESPIRATORY (INHALATION) at 09:37

## 2023-11-26 RX ADMIN — ACETAMINOPHEN 975 MG: 325 TABLET, FILM COATED ORAL at 10:33

## 2023-11-26 RX ADMIN — IPRATROPIUM BROMIDE AND ALBUTEROL SULFATE 3 ML: .5; 3 SOLUTION RESPIRATORY (INHALATION) at 11:35

## 2023-11-26 ASSESSMENT — ACTIVITIES OF DAILY LIVING (ADL)
ADLS_ACUITY_SCORE: 35
ADLS_ACUITY_SCORE: 35

## 2023-11-26 NOTE — ED TRIAGE NOTES
The pt arrives with a cough x 1 week, no sputum audible expiratory wheezes noted. Pt states chest pain with the coughing denies any othet ill contacts. Occitan speaking; presents with daughter n law at bedside.      Triage Assessment (Adult)       Row Name 11/26/23 0842          Triage Assessment    Airway WDL X     Additional Documentation Breath Sounds (Group)        Respiratory WDL    Respiratory WDL X;cough     Cough Frequency infrequent     Cough Type dry        Breath Sounds    Breath Sounds All Fields     All Lung Fields Breath Sounds wheezes, expiratory        Skin Circulation/Temperature WDL    Skin Circulation/Temperature WDL WDL        Cardiac WDL    Cardiac WDL chest pain        Chest Pain Assessment    Associated Signs/Symptoms anxiety        Peripheral/Neurovascular WDL    Peripheral Neurovascular WDL WDL        Cognitive/Neuro/Behavioral WDL    Cognitive/Neuro/Behavioral WDL WDL

## 2023-11-26 NOTE — PROGRESS NOTES
RESPIRATORY CARE NOTE   Patient is on room air, BS coarse exp wheezes, gave Duoneb treatment x1, BS post treatment Increased aeration, coarse exp wheezes, patient perceives mild improvement, patient tolerated well.     Jason Mcdaniel, RT

## 2023-11-26 NOTE — DISCHARGE INSTRUCTIONS
You were seen here in the ER for shortness of breath, chest pain, and cough.     We tested you for signs of infected - your white blood cell count is normal. Your chest xray did not show evidence of an infection in your lung. Your EKG of your heart rhythm was normal as well as your cardiac enzymes which rules out a heart attack. This is likely an exacerbation of your asthma. You should get in to see pulmonology as soon as possible for management of your asthma. I have included the number for the clinic. I will send you home with a prescription for steroids and cough medicine. You can take tylenol 1000mg up to 4 times daily as needed for the chest pain. Please return to the ER if you are having any worsening chest pain, difficulty breathing, fevers, vomiting, or any other concerning symptoms.

## 2023-11-26 NOTE — ED PROVIDER NOTES
ED PROVIDER NOTE    EMERGENCY DEPARTMENT ENCOUNTER      NAME: Kulwant Amin  AGE: 53 year old female  YOB: 1970  MRN: [id number]  EVALUATION DATE & TIME: 11/26/2023  8:45 AM    PCP: Adrien Cardoza    ED PROVIDER: FADI MEDRANO PA-C      Chief Complaint   Patient presents with    Chest Pain    Cough         FINAL IMPRESSION:  No diagnosis found.      MEDICAL DECISION MAKING:    Pertinent Labs & Imaging studies reviewed. (See chart for details)  Kulwant Amin is a 53 year old female with PMH CAD, severe persistent asthma, and diabetes II who presents for evaluation of chest pain, shortness of breath, and cough x 1 week. Rates the pain 9 out of 10 when coughing. She has a history of asthma and is using rescue inhaler more often.  Denies sick contacts, recent travel, hospitalizations, or surgery.  Denies history of blood clots.  Did have an episode of nausea with vomiting yesterday but does not endorse that currently. Denies fevers, chills, abdominal pain, congestion, rhinorrhea, hemoptysis, headaches, body aches, lower extremity swelling.      Vitals unremarkable. On exam ill appearing, tachypneic, audible expiratory rales and coughing.  Abdomen nontender.  Lower extremities without swelling or tenderness.  Differential diagnosis includes but not limited to pneumonia, pulmonary embolism, pericarditis/myocarditis, ACS, pneumothorax, asthma/copd exacerbation, to name a few. Emergency department work up included labs, imaging, and a duoneb treatment and steroids.   Work up revealed no leukocytosis, troponin normal, EKG showed NSR without ST/T changes or extra beats, d-dimer within normal limits.and CXR negative for pneumonia, pneumothorax, or effusions/edema.  Given a normal high-sensitivity troponin less than 6 and symptoms that have been on and off for weeks which is worsening in the last week as well as a normal EKG, a feel confident that this is ACS.  Her chest x-ray was negative for any pneumonia or signs  of infection and she has no leukocytosis.  Her vitals are stable.  She is not tachycardic, afebrile, and respiratory rate has come down on recheck to 25.  Oxygen 100% on room air.  Her D-dimer was negative and she has no risk factors for PE including recent travel, surgery, illness, estrogen use, or history of blood clots.  She is not tachycardic. This is not not likely a pulmonary embolism.  Tested for COVID/flu/RSV which is currently pending.  Can call her if the results are positive.  He will not necessarily change the treatment at this time.  Symptoms and work up most consistent with an asthma/COPD exacerbation.  Her lungs do sound better on auscultation after DuoNeb. Give a dose of Solu-Medrol.  Spoke to Dr. Byrd with her pulmonology team who recommended sending home with a prednisone taper.  They will get her into clinic as soon as possible.    At the conclusion of the encounter I discussed the results of all of the tests and the disposition. The questions were answered. The patient or family acknowledged understanding and was agreeable with the care plan. Patient was discharged in stable condition but instructed to return to the emergency department with any new or worsening of symptoms. Patient expressed understanding, feels comfortable, and is in agreement with this plan. All questions addressed prior to discharge.    History:  Supplemental history from: Documented in chart, if applicable  External Record(s) reviewed: Documented in chart, if applicable. and Outpatient Record: seen in ER on 10/1/23 for CP, SOB, and cough. Workup was negative for ACS, pneumonia, etc.. Was then seen by pulmonology for chronic cough, SOB, and chest pain on 0/25/23 for severe persistent asthma and is on Breo, spiriva, albuterol nebs, singulair, and rescue inhaler and tessalon perlese for cough at night. Called clinic on 10/31 asking for prednisone and was prescribed 40mg x 7 days. Seen by cardiology for CAD. On 11/8/23 for hx  CAD, SVT, HTN, and HLD .    Work Up:  Chart documentation includes differential considered and any EKGs or imaging independently interpreted by provider, where specified.  In additional to work up documented, I considered the following work up: n/a    External consultation:  Discussion of management with another provider: Documented in chart, if applicable and Pulmonology    Complicating factors:  Care impacted by chronic illness: Chronic Lung Disease and Heart Disease  Care affected by social determinants of health: Literacy    Disposition considerations: Discharge. I prescribed additional prescription strength medication(s) as charted. See documentation for any additional details.    ED COURSE:  0855 I met and introduced myself to the patient. I gathered initial history and performed my physical exam. We discussed plan for initial workup.   0900 I have staffed the patient with Dr. Christy Soto, ED MD, who has evaluated the patient and agrees with all aspects of today's care.   ED Course as of 11/26/23 1046   Sun Nov 26, 2023   0915 WBC: 6.6   0944 Troponin T, High Sensitivity: <6   0944 D-Dimer Quantitative: 0.41   1045 Spoke to Dr. Byrd with pulmonology. They recommend a taper of steroids and to have her follow up soon with the clinic.         MEDICATIONS GIVEN IN THE EMERGENCY:  Medications - No data to display    NEW PRESCRIPTIONS STARTED AT TODAY'S ER VISIT  New Prescriptions    No medications on file       =================================================================    HPI    Patient information was obtained from: patient via family member   Use of Intrepreter: Estonian but declined services    Kulwant Amin is a 53 year old female with a pertinent history of asthma who presents to the ED by foot for evaluation of chest pain with coughing and shortness of breath x 1 week.  Rates the pain 9 out of 10 when coughing. She has a history of asthma and is on a daily inhaler as well as an as needed inhaler.   But has been using the as needed rescue inhaler more often.  Denies sick contacts, recent travel, recent hospitalizations, or recent surgery.  Denies history of blood clots.  Denies fevers, chills, abdominal pain, congestion, rhinorrhea, hemoptysis, headaches, body aches, lower extremity swelling.  Did have an episode of nausea with vomiting yesterday but does not endorse that currently.    REVIEW OF SYSTEMS   See HPI, otherwise all other systems reviewed and are negative    PAST MEDICAL HISTORY:  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    Microcytic anemia 11/17/2019    S/P coronary artery stent placement 02/02/2018    TB lung, latent     9 mos INH       PAST SURGICAL HISTORY:  Past Surgical History:   Procedure Laterality Date    CORONARY STENT PLACEMENT  2018    CV CORONARY ANGIOGRAM N/A 1/25/2018    Procedure: Coronary Angiogram;  Surgeon: Angelo Serrano MD;  Location: [facility];  Service:     CV CORONARY ANGIOGRAM N/A 5/5/2022    Procedure: Coronary Angiogram;  Surgeon: Irwin Cano MD;  Location: [facility] CATH LAB CV    CV CORONARY ANGIOGRAM  5/5/2022    Procedure: ;  Surgeon: Irwin Cano MD;  Location: [facility] CATH LAB CV    NH ESOPHAGOGASTRODUODENOSCOPY TRANSORAL DIAGNOSTIC N/A 12/10/2018    Procedure: ESOPHAGOGASTRODUODENOSCOPY (EGD);  Surgeon: Eddie Renteria MD;  Location: [facility] GI;  Service: Gastroenterology    NH ESOPHAGOGASTRODUODENOSCOPY TRANSORAL  DIAGNOSTIC N/A 12/3/2020    Procedure: ESOPHAGOGASTRODUODENOSCOPY (EGD) with biospies ;  Surgeon: Avi Crow MD;  Location: [facility];  Service: Gastroenterology    [facility] COLONOSCOPY W/WO BRUSH/WASH N/A 12/10/2018    Procedure: COLONOSCOPY with polypectomy using biopsy forceps;  Surgeon: Eddie Renteria MD;  Location: [facility];  Service: Gastroenterology       CURRENT MEDICATIONS:      Current Facility-Administered Medications:     tezepelumab-ekko (TEZSPIRE) injection 210 mg, 210 mg, Subcutaneous, q28 days, Elizabeth Marie MD, 210 mg at 11/09/23 1111    Current Outpatient Medications:     acetaminophen (TYLENOL) 500 MG tablet, TAKE 1 PILL BY MOUTH EVERY 6 HOURS AS NEEDED FOR PAIN, Disp: 100 tablet, Rfl: 10    albuterol (PROAIR HFA/PROVENTIL HFA/VENTOLIN HFA) 108 (90 Base) MCG/ACT inhaler, Inhale 2 puffs into the lungs every 4 hours as needed for shortness of breath, Disp: 18 g, Rfl: 11    albuterol (PROVENTIL) (2.5 MG/3ML) 0.083% neb solution, Take 1 vial (2.5 mg) by nebulization every 6 hours as needed for shortness of breath, wheezing or cough, Disp: 240 mL, Rfl: 11    amitriptyline (ELAVIL) 50 MG tablet, Take 1 tablet (50 mg) by mouth At Bedtime, Disp: 90 tablet, Rfl: 1    ASPIRIN LOW DOSE 81 MG EC tablet, TAKE 1 TABLET (81 MG TOTAL) BY MOUTH DAILY., Disp: 90 tablet, Rfl: 3    atorvastatin (LIPITOR) 80 MG tablet, TAKE 1 TABLET (80 MG TOTAL) BY MOUTH AT BEDTIME FOR CHOLESTEROL, Disp: 90 tablet, Rfl: 0    B-D U/F 31G X 8 MM insulin pen needle, USE ONE PEN NEEDLE DAILY AS NEEDED FOR SSI, Disp: 100 each, Rfl: 1    benzonatate (TESSALON) 100 MG capsule, Take 1 capsule (100 mg) by mouth 2 times daily as needed for cough, Disp: 100 capsule, Rfl: 1    calcium carbonate (TUMS) 500 MG chewable tablet, Take 1 tablet (500 mg) by mouth 2 times daily as needed for heartburn, Disp: 30 tablet, Rfl: 0    colchicine (COLCRYS) 0.6 MG tablet, Take 1 tablet (0.6 mg) by mouth daily, Disp: 90 tablet, Rfl: 3     Continuous Blood Gluc Sensor (FREESTYLE MONICA 2 SENSOR) [facility] [place], [place], 1 EACH EVERY 14 DAYS USE 1 SENSOR EVERY 14 DAYS. USE TO READ BLOOD SUGARS PER 'S INSTRUCTIONS., Disp: 2 each, Rfl: 11    cyclobenzaprine (FLEXERIL) 5 MG tablet, Take 5 mg by mouth 3 times daily as needed for muscle spasms, Disp: , Rfl:     dextromethorphan (DELSYM) 30 MG/5ML liquid, Take 10 mLs (60 mg) by mouth 2 times daily as needed for cough, Disp: 178 mL, Rfl: 0    diclofenac (VOLTAREN) 1 % topical gel, Apply 4 g topically 4 times daily, Disp: 350 g, Rfl: 3    docusate sodium (COLACE) 100 MG capsule, Take 2 capsules (200 mg) by mouth 2 times daily as needed for constipation, Disp: 90 capsule, Rfl: 3    dupilumab (DUPIXENT) 300 MG/2ML prefilled pen, Inject 2 mLs (300 mg) Subcutaneous every 14 days (Patient not taking: Reported on 11/7/2023), Disp: 4 mL, Rfl: 5    FLUoxetine (PROZAC) 10 MG capsule, TAKE 1 CAPSULE (10 MG) BY MOUTH DAILY, Disp: 90 capsule, Rfl: 2    fluticasone-vilanterol (BREO ELLIPTA) 200-25 MCG/ACT inhaler, Inhale 1 puff into the lungs daily, Disp: 60 each, Rfl: 11    gabapentin (NEURONTIN) 300 MG capsule, Take 2 capsules (600 mg) by mouth 3 times daily, Disp: 540 capsule, Rfl: 3    galcanezumab-gnlm (EMGALITY) 120 MG/ML injection, Inject 2 mLs (240 mg) Subcutaneous every 28 days Loading dose., Disp: 2 mL, Rfl: 0    galcanezumab-gnlm (EMGALITY) 120 MG/ML injection, Inject 1 mL (120 mg) Subcutaneous every 28 days (Patient not taking: Reported on 11/8/2023), Disp: 1 mL, Rfl: 3    hydrochlorothiazide (MICROZIDE) 12.5 MG capsule, TAKE 1 CAPSULE (12.5 MG TOTAL) BY MOUTH DAILY FOR BLOOD PRESSURE, Disp: 90 capsule, Rfl: 2    hydrocortisone (CORTAID) 1 % external cream, Apply topically 2 times daily, Disp: 60 g, Rfl: 0    ibuprofen (ADVIL/MOTRIN) 600 MG tablet, Take 600 mg by mouth every 6 hours as needed for moderate pain, Disp: , Rfl:     insulin aspart (NOVOLOG PEN) 100 UNIT/ML pen, Inject 16 Units Subcutaneous 2 times daily  "(with meals), Disp: 15 mL, Rfl: 3    insulin glargine (LANTUS PEN) 100 UNIT/ML pen, Inject 44 Units Subcutaneous every morning, Disp: 45 mL, Rfl: 3    insulin pen needle (32G X 4 MM) 32G X 4 MM miscellaneous, Use one pen needles daily or as directed., Disp: 200 each, Rfl: 11    loratadine (CLARITIN) 10 MG tablet, Take 10 mg by mouth daily, Disp: , Rfl:     meclizine (ANTIVERT) 12.5 MG tablet, TAKE 2 TABLETS (25 MG) BY MOUTH 3 TIMES DAILY AS NEEDED FOR DIZZINESS, Disp: 90 tablet, Rfl: 0    metFORMIN (GLUCOPHAGE XR) 500 MG 24 hr tablet, Take 1 tablet (500 mg) by mouth 2 times daily (with meals), Disp: 180 tablet, Rfl: 3    metoprolol tartrate (LOPRESSOR) 25 MG tablet, TAKE 1 TABLET (25 MG TOTAL) BY MOUTH 2 (TWO) TIMES A DAY FOR BLOOD PRESSURE, Disp: 180 tablet, Rfl: 3    montelukast (SINGULAIR) 10 MG tablet, Take 1 tablet (10 mg) by mouth At Bedtime, Disp: 30 tablet, Rfl: 11    nystatin (MYCOSTATIN) 800818 UNIT/ML suspension, Take 1,000,000 Units by mouth 4 times daily, Disp: , Rfl:     omeprazole (PRILOSEC) 40 MG DR capsule, Take 1 capsule (40 mg) by mouth daily, Disp: 90 capsule, Rfl: 3    Polyethylene Glycol 3350 (PEG 3350) 17 GM/SCOOP POWD, MIX 17 GRAMS WITH LIQUID AND DRINK ONCE DAILY FOR CONSTIPATION, Disp: 1530 g, Rfl: 3    sucralfate (CARAFATE) 1 GM/10ML suspension, Take 10 mLs (1 g) by mouth 4 times daily, Disp: 3600 mL, Rfl: 1    tiotropium (SPIRIVA RESPIMAT) 2.5 MCG/ACT inhaler, Inhale 2 puffs into the lungs daily, Disp: 4 g, Rfl: 11    ALLERGIES:  No Known Allergies    FAMILY HISTORY:  Family History   Problem Relation Age of Onset    Other - See Comments Mother          of an intestinal problem    Ulcers Father          of gastritis    Breast Cancer No family hx of          VITALS:  Vitals:    23 0838   BP: 135/81   Cuff Size: Adult Regular   Pulse: 97   Resp: 20   Temp: 98.2  F (36.8  C)   TempSrc: Oral   SpO2: 100%   Weight: 55.3 kg (121 lb 14.6 oz)   Height: 1.6 m (5' 3\")       PHYSICAL " "EXAM      First Vitals:  Patient Vitals for the past 24 hrs:   BP Temp Temp src Pulse Resp SpO2 Height Weight   11/26/23 0838 135/81 98.2  F (36.8  C) Oral 97 20 100 % 1.6 m (5' 3\") 55.3 kg (121 lb 14.6 oz)       Physical Exam      General Appearance:  ill appearing in no acute respiratory distress  HENT: Normocephalic without obvious deformity, atraumatic.   Eyes: Conjunctiva clear, Lids normal. No discharge.   Respiratory: lungs with rales and expiratory wheezing throughout  Cardiovascular: Regular rate and rhythm, no murmur. Normal cap refill. No peripheral edema  GI: Abdomen soft, nontender, normal bowel sounds  Musculoskeletal: Moving all extremities. No gross deformities  Integument: Warm, dry, no rashes or lesions  Neurologic: Alert and orientated x3. No focal deficits.  Psych: Normal mood and affect      LAB:  Labs Ordered and Resulted from Time of ED Arrival to Time of ED Departure - No data to display    RADIOLOGY:  No orders to display       EKG:    Performed at: 0852  Impression: Normal sinus rhythm.    Rate 91.    KS interval 148.    QRS duration 84.    QTc 455.    Some pulmonary disease pattern and left anterior fascicular block. No obvious T wave changes or ST elevation/depression  No change when found compared to 10/1/2023  Dr. Christy Soto and I have independently reviewed and interpreted the EKG(s) documented above.      Dania Messina PA-C  Emergency Medicine   [facility] EMERGENCY DEPARTMENT             Dania Messina PA-C  11/28/23 1747    "

## 2023-11-26 NOTE — ED NOTES
"  Emergency Department Staff Physician Note     I had a face to face encounter with this patient seen by the Advanced Practice Provider (JANNA).  I have seen, examined, and discussed the patient with the JANNA and agree with their assessment and plan of management.     I personally saw the patient and performed a substantive portion of the visit including all aspects of the medical decision making.      Relevant HPI:       Kulwant Amin is a 53 year old female who presents to the Emergency Department for evaluation of chest pain, a cough and shortness of breath.     The patient reports having ongoing chest pain with a cough and shortness of breath for one week. She has a history of asthma and has been using her rescue inhaler more frequently without improvement. She also reports having some nausea and vomiting on 11/25.     The patient denies having any leg swelling, upper respiratory symptoms, coughing blood, recent travel or sick contacts.       ED Course:  8:59 AM I received the patient report from the JANNA Dania Messina PA-C. I agree with her assessment and plan of management, and I will see the patient myself.  9:01 AM I met with the patient to introduce myself, gather additional history, perform my initial exam, and discuss the plan.   11:08 AM I reevaluated the patient.     Exam:  /65   Pulse 101   Temp 98.2  F (36.8  C) (Oral)   Resp 25   Ht 1.6 m (5' 3\")   Wt 55.3 kg (121 lb 14.6 oz)   SpO2 98%   BMI 21.60 kg/m       Well-appearing nontoxic coarse breath sounds bilaterally faint expiratory wheezes right greater than left.    Start DuoNeb still with expiratory wheezes but improved.  Given another butyryl.  On reevaluation coarse breath sounds and respiratory wheezes.  Discussed with patient and her daughter extensively.  Recommend albuterol as instructed prednisone pulmonologist is going to call patient for follow-up and strict return precautions given.  Patient discharged in stable " condition.    Results for orders placed or performed during the hospital encounter of 11/26/23   XR Chest 2 Views    Impression    IMPRESSION: Negative chest.   D dimer quantitative   Result Value Ref Range    D-Dimer Quantitative 0.41 0.00 - 0.50 ug/mL FEU   Basic metabolic panel   Result Value Ref Range    Sodium 141 135 - 145 mmol/L    Potassium 4.3 3.4 - 5.3 mmol/L    Chloride 102 98 - 107 mmol/L    Carbon Dioxide (CO2) 28 22 - 29 mmol/L    Anion Gap 11 7 - 15 mmol/L    Urea Nitrogen 10.8 6.0 - 20.0 mg/dL    Creatinine 0.56 0.51 - 0.95 mg/dL    GFR Estimate >90 >60 mL/min/1.73m2    Calcium 9.6 8.6 - 10.0 mg/dL    Glucose 135 (H) 70 - 99 mg/dL   Result Value Ref Range    Troponin T, High Sensitivity <6 <=14 ng/L   Symptomatic Influenza A/B, RSV, & SARS-CoV2 PCR (COVID-19) Nasopharyngeal    Specimen: Nasopharyngeal; Swab   Result Value Ref Range    Influenza A PCR Negative Negative    Influenza B PCR Negative Negative    RSV PCR Negative Negative    SARS CoV2 PCR Negative Negative   CBC with platelets and differential   Result Value Ref Range    WBC Count 6.6 4.0 - 11.0 10e3/uL    RBC Count 4.94 3.80 - 5.20 10e6/uL    Hemoglobin 12.1 11.7 - 15.7 g/dL    Hematocrit 39.3 35.0 - 47.0 %    MCV 80 78 - 100 fL    MCH 24.5 (L) 26.5 - 33.0 pg    MCHC 30.8 (L) 31.5 - 36.5 g/dL    RDW 14.6 10.0 - 15.0 %    Platelet Count 196 150 - 450 10e3/uL    % Neutrophils 52 %    % Lymphocytes 32 %    % Monocytes 9 %    % Eosinophils 6 %    % Basophils 1 %    % Immature Granulocytes 0 %    NRBCs per 100 WBC 0 <1 /100    Absolute Neutrophils 3.4 1.6 - 8.3 10e3/uL    Absolute Lymphocytes 2.1 0.8 - 5.3 10e3/uL    Absolute Monocytes 0.6 0.0 - 1.3 10e3/uL    Absolute Eosinophils 0.4 0.0 - 0.7 10e3/uL    Absolute Basophils 0.0 0.0 - 0.2 10e3/uL    Absolute Immature Granulocytes 0.0 <=0.4 10e3/uL    Absolute NRBCs 0.0 10e3/uL     EKG #1  Sinus rhythm normal anterior progression left axis deviation left anterior fascicular  block.    Time:085200    Ventricular rate 91 bmp  Axis LAD  WY interval 148 ms  QRS duration 84 ms  QT//455 ms    Compared to previous EKG on October 1, 2023 left axis deviation pulmonary disease pattern.  Impression / ED Plan:  Kulwant Amin is a 53 year old female presents to the ED for evaluation of Hartness of breath      Please refer to the Advanced Practice Provider's note for further details and ED course. Agree with history, plan and disposition.     1. Severe asthma with exacerbation, unspecified whether persistent              I, Rosa Isela Valdez, am serving as a scribe to document services personally performed by Christy Soto MD based on my observations and the provider's statements to me.  I, Christy Soto MD attest that Rosa Isela Valdez is acting in a scribe capacity, has observed my performance of the services and has documented them in accordance with my direction.      Christy Soto MD  11/26/23 7616

## 2023-11-27 ENCOUNTER — TELEPHONE (OUTPATIENT)
Dept: FAMILY MEDICINE | Facility: CLINIC | Age: 53
End: 2023-11-27
Payer: COMMERCIAL

## 2023-11-27 NOTE — TELEPHONE ENCOUNTER
Reason for Call:  Appointment Request    Patient requesting this type of appt:  Hospital/ED Follow-Up     Requested provider: Adrien Cardoza    Reason patient unable to be scheduled: Not within requested timeframe    When does patient want to be seen/preferred time: 3-7 days    Comments: Patient was seen in ER 11/26/2023 and need an follow up appt. We could not locate one within requested time frame. Patient asked if we are able to add the ER follow up the 12/4 appt. Please reach out to advise if they are able to combine the two appts, and if not, please help the patient get scheduled within requested time frame for the ER follow up. I called  priority to help with the process but was transferred to green line nurse and was a hold for a long time 10 mins+.     Could we send this information to you in [employer] or would you prefer to receive a phone call?:   No preference   Okay to leave a detailed message?: Yes at Cell number on file:    Telephone Information:   Mobile [phone]       Call taken on 11/27/2023 at 3:07 PM by Odalys Schilling

## 2023-11-28 NOTE — TELEPHONE ENCOUNTER
2nd attempted call to son, requesting call back to discuss information in previous message.    Jero Goyal, RN, BSN  [facility] Neurology

## 2023-11-28 NOTE — TELEPHONE ENCOUNTER
Updated upcoming appointment to ED follow up with Nani.   Patient declined scheduling with another provider.

## 2023-11-30 DIAGNOSIS — Z53.9 DIAGNOSIS NOT YET DEFINED: Primary | ICD-10-CM

## 2023-11-30 PROCEDURE — G0179 MD RECERTIFICATION HHA PT: HCPCS | Performed by: FAMILY MEDICINE

## 2023-12-01 NOTE — TELEPHONE ENCOUNTER
Unable to reach patient.    Staff has made multiple attempts to reach the patient.    A letter has been composed and mailed advising patient to reconnect with our clinic to discuss any questions concerns they may have.    Jero Goyal RN, BSN  [facility]

## 2023-12-05 ENCOUNTER — OFFICE VISIT (OUTPATIENT)
Dept: FAMILY MEDICINE | Facility: CLINIC | Age: 53
End: 2023-12-05
Payer: COMMERCIAL

## 2023-12-05 VITALS
HEIGHT: 63 IN | DIASTOLIC BLOOD PRESSURE: 68 MMHG | WEIGHT: 123.9 LBS | BODY MASS INDEX: 21.95 KG/M2 | HEART RATE: 97 BPM | OXYGEN SATURATION: 97 % | TEMPERATURE: 98 F | RESPIRATION RATE: 20 BRPM | SYSTOLIC BLOOD PRESSURE: 122 MMHG

## 2023-12-05 DIAGNOSIS — Z79.4 TYPE 2 DIABETES MELLITUS TREATED WITH INSULIN (H): ICD-10-CM

## 2023-12-05 DIAGNOSIS — K21.9 GASTROESOPHAGEAL REFLUX DISEASE WITHOUT ESOPHAGITIS: Chronic | ICD-10-CM

## 2023-12-05 DIAGNOSIS — J45.901 ASTHMA WITH ACUTE EXACERBATION, UNSPECIFIED ASTHMA SEVERITY, UNSPECIFIED WHETHER PERSISTENT: Primary | ICD-10-CM

## 2023-12-05 DIAGNOSIS — E11.9 TYPE 2 DIABETES MELLITUS TREATED WITH INSULIN (H): ICD-10-CM

## 2023-12-05 DIAGNOSIS — J44.9 CHRONIC OBSTRUCTIVE PULMONARY DISEASE, UNSPECIFIED COPD TYPE (H): Chronic | ICD-10-CM

## 2023-12-05 DIAGNOSIS — F33.9 RECURRENT MAJOR DEPRESSIVE DISORDER, REMISSION STATUS UNSPECIFIED (H): Chronic | ICD-10-CM

## 2023-12-05 PROCEDURE — 99214 OFFICE O/P EST MOD 30 MIN: CPT | Performed by: FAMILY MEDICINE

## 2023-12-05 RX ORDER — SUCRALFATE ORAL 1 G/10ML
1 SUSPENSION ORAL 4 TIMES DAILY
Qty: 3600 ML | Refills: 1 | Status: SHIPPED | OUTPATIENT
Start: 2023-12-05 | End: 2024-09-05

## 2023-12-05 RX ORDER — FLUOXETINE 10 MG/1
10 CAPSULE ORAL DAILY
Qty: 90 CAPSULE | Refills: 2 | Status: CANCELLED | OUTPATIENT
Start: 2023-12-05

## 2023-12-05 RX ORDER — MECLIZINE HCL 12.5 MG 12.5 MG/1
25 TABLET ORAL 3 TIMES DAILY PRN
Qty: 90 TABLET | Refills: 0 | Status: CANCELLED | OUTPATIENT
Start: 2023-12-05

## 2023-12-05 RX ORDER — AZITHROMYCIN 250 MG/1
TABLET, FILM COATED ORAL
Qty: 6 TABLET | Refills: 0 | Status: SHIPPED | OUTPATIENT
Start: 2023-12-05 | End: 2023-12-10

## 2023-12-05 RX ORDER — SUCRALFATE ORAL 1 G/10ML
1 SUSPENSION ORAL 4 TIMES DAILY
Qty: 3600 ML | Refills: 1 | Status: CANCELLED | OUTPATIENT
Start: 2023-12-05

## 2023-12-05 RX ORDER — ATORVASTATIN CALCIUM 80 MG/1
TABLET, FILM COATED ORAL
Qty: 90 TABLET | Refills: 0 | Status: CANCELLED | OUTPATIENT
Start: 2023-12-05

## 2023-12-05 RX ORDER — BENZONATATE 200 MG/1
200 CAPSULE ORAL 3 TIMES DAILY PRN
Qty: 30 CAPSULE | Refills: 0 | Status: CANCELLED | OUTPATIENT
Start: 2023-12-05

## 2023-12-05 ASSESSMENT — PATIENT HEALTH QUESTIONNAIRE - PHQ9
SUM OF ALL RESPONSES TO PHQ QUESTIONS 1-9: 7
10. IF YOU CHECKED OFF ANY PROBLEMS, HOW DIFFICULT HAVE THESE PROBLEMS MADE IT FOR YOU TO DO YOUR WORK, TAKE CARE OF THINGS AT HOME, OR GET ALONG WITH OTHER PEOPLE: VERY DIFFICULT
SUM OF ALL RESPONSES TO PHQ QUESTIONS 1-9: 7

## 2023-12-05 NOTE — PROGRESS NOTES
Asthma with acute exacerbation, unspecified asthma severity, unspecified whether persistent  Still taking oral prednisone taper dose prescribed from the ED, and advised to continue that.  Added Zithromax.  Follow-up with pulmonology as scheduled.  - azithromycin (ZITHROMAX) 250 MG tablet; Take 2 tablets (500 mg) by mouth daily for 1 day, THEN 1 tablet (250 mg) daily for 4 days.    Gastroesophageal reflux disease without esophagitis  - sucralfate (CARAFATE) 1 GM/10ML suspension; Take 10 mLs (1 g) by mouth 4 times daily    Chronic obstructive pulmonary disease, unspecified COPD type (H)  Managed by pulmonology.    Type 2 diabetes mellitus treated with insulin (H)  Advised to keep scheduled follow-up appointment with PCP.  Labs at next visit.    Recurrent major depressive disorder, remission status unspecified (H24)  Stable.    Doug Blum is a 53 year old female with multiple chronic medical conditions including COPD/asthma here for ED follow-up.  She was seen in the ED 9 days ago due to cough and shortness of breath, was diagnosed with severe asthma with exacerbation.  She was sent home with prednisone taper dose.  Daughter-in-law and patient reports no significant improvement since that ED visit.  He is still taking oral prednisone.  No fever since the ED visit.  Cough is still present but not worsening.          12/5/2023     8:44 AM   Additional Questions   Roomed by Genesis PAEZ   Accompanied by daughter including asthma/COPD here for ED follow-up.[facility] Follow-up Visit:    Hospital/Nursing Home/IP Rehab Facility: [facility]  Date of Admission: n/a  Date of Discharge: n/a  Reason(s) for Admission: N/a    Was your hospitalization related to COVID-19? No   Problems taking medications regularly:  None  Medication changes since discharge: None  Problems adhering to non-medication therapy:  None    Summary of hospitalization:  [facility] discharge  "summary reviewed  Diagnostic Tests/Treatments reviewed.  Follow up needed: Pulm  Other Healthcare Providers Involved in Patient s Care:        Update since discharge: fluctuating course.       MED REC REQUIRED  Post Medication Reconciliation Status:  Discharge medications reconciled and changed, see notes/orders   Plan of care communicated with patient          Review of Systems   CONSTITUTIONAL: NEGATIVE for fever, chills, change in weight  CV: NEGATIVE for chest pain/chest pressure      Objective    /68   Temp 98  F (36.7  C) (Oral)   Resp 20   Ht 1.6 m (5' 3\")   Wt 56.2 kg (123 lb 14.4 oz)   BMI 21.95 kg/m    Body mass index is 21.95 kg/m .  Physical Exam   GENERAL: healthy, alert and no distress  NECK: Supple  RESP: lungs clear to auscultation - no rales, rhonchi or wheezes and expiratory wheezes bilateral  CV: regular rates and rhythm and no murmur, click or rub  PSYCH: mentation appears normal, affect normal/bright    This transcription uses voice recognition software, which may contain typographical errors.                  "

## 2023-12-07 ENCOUNTER — OFFICE VISIT (OUTPATIENT)
Dept: PULMONOLOGY | Facility: CLINIC | Age: 53
End: 2023-12-07
Payer: COMMERCIAL

## 2023-12-07 ENCOUNTER — ALLIED HEALTH/NURSE VISIT (OUTPATIENT)
Dept: ALLERGY | Facility: CLINIC | Age: 53
End: 2023-12-07
Payer: COMMERCIAL

## 2023-12-07 VITALS
OXYGEN SATURATION: 98 % | BODY MASS INDEX: 22.14 KG/M2 | SYSTOLIC BLOOD PRESSURE: 118 MMHG | HEART RATE: 93 BPM | WEIGHT: 125 LBS | DIASTOLIC BLOOD PRESSURE: 62 MMHG

## 2023-12-07 DIAGNOSIS — J45.50 SEVERE PERSISTENT ASTHMA WITHOUT COMPLICATION (H): Primary | ICD-10-CM

## 2023-12-07 DIAGNOSIS — J45.50 SEVERE PERSISTENT ASTHMA, UNSPECIFIED WHETHER COMPLICATED (H): Primary | ICD-10-CM

## 2023-12-07 PROCEDURE — 99214 OFFICE O/P EST MOD 30 MIN: CPT | Performed by: INTERNAL MEDICINE

## 2023-12-07 PROCEDURE — 96372 THER/PROPH/DIAG INJ SC/IM: CPT | Performed by: ALLERGY & IMMUNOLOGY

## 2023-12-07 ASSESSMENT — ASTHMA QUESTIONNAIRES
QUESTION_3 LAST FOUR WEEKS HOW OFTEN DID YOUR ASTHMA SYMPTOMS (WHEEZING, COUGHING, SHORTNESS OF BREATH, CHEST TIGHTNESS OR PAIN) WAKE YOU UP AT NIGHT OR EARLIER THAN USUAL IN THE MORNING: FOUR OR MORE NIGHTS A WEEK
QUESTION_1 LAST FOUR WEEKS HOW MUCH OF THE TIME DID YOUR ASTHMA KEEP YOU FROM GETTING AS MUCH DONE AT WORK, SCHOOL OR AT HOME: ALL OF THE TIME
QUESTION_5 LAST FOUR WEEKS HOW WOULD YOU RATE YOUR ASTHMA CONTROL: NOT CONTROLLED AT ALL
QUESTION_2 LAST FOUR WEEKS HOW OFTEN HAVE YOU HAD SHORTNESS OF BREATH: MORE THAN ONCE A DAY
ACT_TOTALSCORE: 5
ACT_TOTALSCORE: 5
QUESTION_4 LAST FOUR WEEKS HOW OFTEN HAVE YOU USED YOUR RESCUE INHALER OR NEBULIZER MEDICATION (SUCH AS ALBUTEROL): THREE OR MORE TIMES PER DAY

## 2023-12-07 NOTE — PROGRESS NOTES
Kulwant Amin presents to clinic today at the request of Elizabeth Marie MD (ordering provider) for Tezpire (tezepelumab-ekko) injection(s).       This service provided today was under the care of Elizabeth Marie MD; the supervising provider of the day; who was available if needed.      Discharged from clinic.    Chelita Palma RN

## 2023-12-07 NOTE — PROGRESS NOTES
Clinic Administered Medication Documentation      Injectable Medication Documentation    Is there an active order (written within the past 365 days, with administrations remaining, not ) in the chart? Yes.     Patient was given  Tezspire . Prior to medication administration, verified patient's identity using patient s name and date of birth. Please see MAR and medication order for additional information. Patient instructed to report any adverse reaction to staff immediately.    Vial/Syringe: Syringe  Was this medication supplied by the patient? No  Is this a medication the patient will need to receive again? Yes. Verified that the patient has refills remaining in their prescription.

## 2023-12-07 NOTE — PROGRESS NOTES
Pulmonary Outpatient Clinic Follow Up      Assessment/Plan:    53 year old woman with history of organic fuel exposure in the home was previously had nondiagnostic PFTs.  She was previously evaluated by Dr. Marie and i is being treated with monoclonal antibodies for her hypereosinophilia which is likely driving her asthma.  She was recently admitted to the ER for another exacerbation.    Severe persistent asthma: Follows in clinic for this and has baseline symptoms regularly.  Continue Breo Ellipta 1 puff daily.  She knows to gargle after using this.   Continue Spiriva.  Continue to use 3 times daily albuterol nebs.  Continue Singulair daily.  As needed albuterol rescue.  Tezpire once a month prescribed through the allergy clinic.  Will place a consult to Dr. Elsie Main at  as she is an asthma specialist (to obtain a second opinion).  Questionable eosinophilic esophagitis:   Follows with GI for this.  Omeprazole 40 mg a day.  As needed Tums.  Follow-up: 3 months with me.    Tamra Duong MD  Pulmonary and Critical Care  (p) [phone]      Subjective:   Patient ID: Ms. Amin is a 53 year old female with a past medical history significant for anxiety, arthritis, questionable asthma versus COPD, diabetes, hypertension and a prior history of SVT presents with her daughter in law for a follow-up visit after a recent hospital presentation for worsening SOB. She was noted to have worsening wheezing and was administered multiple duonebs with some improvement. There was no evidence of cardiac dysfunction and she was discharged with a prednisone taper which she is still on.  She states that she is presently feeling better and continues to use her Breo and Spiriva inhalers as prescribed.  She has been using her albuterol nebs every 6 hours and states that she was told by the emergency department to use these every 2 hours for the first 48 hours.  Presently she has no fevers or chills and endorses night sweats but  then also states that she is sweaty throughout the day.  She uses 1 pillow under her head at night but does sleep in a hospital bed that is propped up to about 20 degrees.          8/24/2023    10:00 AM 10/25/2023     1:00 PM 12/7/2023     1:00 PM   ACT Total Scores   ACT TOTAL SCORE (Goal Greater than or Equal to 20) 9 5 5   In the past 12 months, how many times did you visit the emergency room for your asthma without being admitted to the hospital? 0 0 0   In the past 12 months, how many times were you hospitalized overnight because of your asthma? 0 0        Current Outpatient Medications   Medication Sig Dispense Refill    acetaminophen (TYLENOL) 500 MG tablet TAKE 1 PILL BY MOUTH EVERY 6 HOURS AS NEEDED FOR PAIN 100 tablet 10    albuterol (PROAIR HFA/PROVENTIL HFA/VENTOLIN HFA) 108 (90 Base) MCG/ACT inhaler Inhale 2 puffs into the lungs every 4 hours as needed for shortness of breath 18 g 11    albuterol (PROVENTIL) (2.5 MG/3ML) 0.083% neb solution Take 1 vial (2.5 mg) by nebulization every 6 hours as needed for shortness of breath, wheezing or cough 240 mL 11    amitriptyline (ELAVIL) 50 MG tablet Take 1 tablet (50 mg) by mouth At Bedtime 90 tablet 1    ASPIRIN LOW DOSE 81 MG EC tablet TAKE 1 TABLET (81 MG TOTAL) BY MOUTH DAILY. 90 tablet 3    atorvastatin (LIPITOR) 80 MG tablet TAKE 1 TABLET (80 MG TOTAL) BY MOUTH AT BEDTIME FOR CHOLESTEROL 90 tablet 0    azithromycin (ZITHROMAX) 250 MG tablet Take 2 tablets (500 mg) by mouth daily for 1 day, THEN 1 tablet (250 mg) daily for 4 days. 6 tablet 0    B-D U/F 31G X 8 MM insulin pen needle USE ONE PEN NEEDLE DAILY AS NEEDED FOR  each 1    benzonatate (TESSALON) 100 MG capsule Take 1 capsule (100 mg) by mouth 2 times daily as needed for cough 100 capsule 1    calcium carbonate (TUMS) 500 MG chewable tablet Take 1 tablet (500 mg) by mouth 2 times daily as needed for heartburn 30 tablet 0    colchicine (COLCRYS) 0.6 MG tablet Take 1 tablet (0.6 mg) by mouth  daily 90 tablet 3    Continuous Blood Gluc Sensor (FREESTYLE MONICA 2 SENSOR) MISC 1 EACH EVERY 14 DAYS USE 1 SENSOR EVERY 14 DAYS. USE TO READ BLOOD SUGARS PER 'S INSTRUCTIONS. 2 each 11    CONTOUR NEXT TEST test strip USE 1 EACH AS DIRECTED 3 (THREE) TIMES A DAY. 50 strip 11    cyclobenzaprine (FLEXERIL) 5 MG tablet Take 5 mg by mouth 3 times daily as needed for muscle spasms      dextromethorphan (DELSYM) 30 MG/5ML liquid Take 10 mLs (60 mg) by mouth 2 times daily as needed for cough 178 mL 0    diclofenac (VOLTAREN) 1 % topical gel Apply 4 g topically 4 times daily 350 g 3    docusate sodium (COLACE) 100 MG capsule Take 2 capsules (200 mg) by mouth 2 times daily as needed for constipation 90 capsule 3    dupilumab (DUPIXENT) 300 MG/2ML prefilled pen Inject 2 mLs (300 mg) Subcutaneous every 14 days (Patient taking differently: Inject 300 mg Subcutaneous every 14 days Using every 30 days) 4 mL 5    FLUoxetine (PROZAC) 10 MG capsule TAKE 1 CAPSULE (10 MG) BY MOUTH DAILY 90 capsule 2    fluticasone-vilanterol (BREO ELLIPTA) 200-25 MCG/ACT inhaler Inhale 1 puff into the lungs daily 60 each 11    gabapentin (NEURONTIN) 300 MG capsule Take 2 capsules (600 mg) by mouth 3 times daily 540 capsule 3    galcanezumab-gnlm (EMGALITY) 120 MG/ML injection Inject 2 mLs (240 mg) Subcutaneous every 28 days Loading dose. 2 mL 0    hydrochlorothiazide (MICROZIDE) 12.5 MG capsule TAKE 1 CAPSULE (12.5 MG TOTAL) BY MOUTH DAILY FOR BLOOD PRESSURE 90 capsule 2    hydrocortisone (CORTAID) 1 % external cream Apply topically 2 times daily 60 g 0    ibuprofen (ADVIL/MOTRIN) 600 MG tablet Take 600 mg by mouth every 6 hours as needed for moderate pain      insulin aspart (NOVOLOG PEN) 100 UNIT/ML pen Inject 16 Units Subcutaneous 2 times daily (with meals) 15 mL 3    insulin glargine (LANTUS PEN) 100 UNIT/ML pen Inject 44 Units Subcutaneous every morning 45 mL 3    insulin pen needle (32G X 4 MM) 32G X 4 MM miscellaneous Use one pen  needles daily or as directed. 200 each 11    loratadine (CLARITIN) 10 MG tablet Take 10 mg by mouth daily      meclizine (ANTIVERT) 12.5 MG tablet TAKE 2 TABLETS (25 MG) BY MOUTH 3 TIMES DAILY AS NEEDED FOR DIZZINESS 90 tablet 0    metFORMIN (GLUCOPHAGE XR) 500 MG 24 hr tablet Take 1 tablet (500 mg) by mouth 2 times daily (with meals) 180 tablet 3    metoprolol tartrate (LOPRESSOR) 25 MG tablet TAKE 1 TABLET (25 MG TOTAL) BY MOUTH 2 (TWO) TIMES A DAY FOR BLOOD PRESSURE 180 tablet 3    montelukast (SINGULAIR) 10 MG tablet Take 1 tablet (10 mg) by mouth At Bedtime 30 tablet 11    omeprazole (PRILOSEC) 40 MG DR capsule Take 1 capsule (40 mg) by mouth daily 90 capsule 3    Polyethylene Glycol 3350 (PEG 3350) 17 GM/SCOOP POWD MIX 17 GRAMS WITH LIQUID AND DRINK ONCE DAILY FOR CONSTIPATION 1530 g 3    predniSONE (DELTASONE) 20 MG tablet Take 2 tablets (40 mg) by mouth daily for 4 days, THEN 1.5 tablets (30 mg) daily for 3 days, THEN 1 tablet (20 mg) daily for 3 days, THEN 0.5 tablets (10 mg) daily for 3 days. 17 tablet 0    sucralfate (CARAFATE) 1 GM/10ML suspension Take 10 mLs (1 g) by mouth 4 times daily 3600 mL 1    tiotropium (SPIRIVA RESPIMAT) 2.5 MCG/ACT inhaler Inhale 2 puffs into the lungs daily 4 g 11    galcanezumab-gnlm (EMGALITY) 120 MG/ML injection Inject 1 mL (120 mg) Subcutaneous every 28 days 1 mL 3    nystatin (MYCOSTATIN) 615742 UNIT/ML suspension Take 1,000,000 Units by mouth 4 times daily       Social history:  Lives in a house built in 1963; no concern for mold in the house.  7 People live in the house including 2 children.  There are no pets in the house.  She is a never smoker and there is no second hand exposure to smoke.  She does not drink alcoholic beverages and denies indulging in recreational drugs.    Physical Exam   There were no vitals taken for this visit.    Physical Exam  Constitutional:       General: She is not in acute distress.     Appearance: She is not ill-appearing or diaphoretic.    Cardiovascular:      Rate and Rhythm: Normal rate and regular rhythm.      Pulses: Normal pulses.      Heart sounds: Normal heart sounds.   Pulmonary:      Effort: Pulmonary effort is normal. No respiratory distress.      Breath sounds: Wheezing present. No rhonchi.   Musculoskeletal:      Right lower leg: No edema.      Left lower leg: No edema.   Skin:     General: Skin is warm and dry.      Findings: No rash.   Neurological:      Mental Status: She is alert.   Psychiatric:         Behavior: Behavior normal.          Latest Reference Range & Units 02/07/22 15:47   Neutrophil Cytoplasmic Antibody <1:10  <1:10   Neutrophil Cytoplasmic Antibody Pattern  The ANCA IFA is <1:10.  No further testing will be performed.      Latest Reference Range & Units 02/07/22 15:47   Schistosoma Ab IgG Negative  Negative [1]   Strongyloides Bere IgG <=0.9 IV 0.4 [2]      Latest Reference Range & Units 02/07/22 15:47   Immunoglobulin E <=214 kU/L 74 [1]   Allergen Fungimold A Fumigatus IgE <=0.34 kU/L <0.10 [2]   Aspergillus Fumagatis 1 Antibody None Detected  None Detected [3]   Aspergillus Fumagatis 6 Antibody None Detected  None Detected [4]   Allergen, Interp, Immunocap Score IgE  See Note [5]     XR CHEST 2 VIEWS, DATE/TIME: 3/27/2023 8:32 AM  INDICATION: chest pain  COMPARISON: 02/05/2023                                                              IMPRESSION: No pneumothorax or pleural effusion. No focal consolidation. Cardiac silhouette within normal limits. Mildly atherosclerotic aorta.    CT CHEST W/O CONTRAST, DATE/TIME: 7/11/2022 11:28 PM  INDICATION: Chest pain.  COMPARISON: 5/4/2022.  TECHNIQUE: CT chest without IV contrast. Multiplanar reformats were obtained. Dose reduction techniques were used.  CONTRAST: None.  FINDINGS:   LUNGS AND PLEURA: There is atelectasis and scarring at the lung bases. Scarring at the lung apices. Mild dependent atelectasis bilaterally. Subpleural calcified granuloma or calcified plaque in the  right lower lobe laterally. Stable 2 mm nodule in the   right upper lobe posteriorly. Stable 3 mm nodule in the lingula laterally. Tiny nodule along the left major fissure laterally is stable. No pneumothorax or pleural effusion.  MEDIASTINUM/AXILLAE: No lymph node enlargement. Central airways are unremarkable. The heart size is normal.  CORONARY ARTERY CALCIFICATION: Previous intervention (stents or CABG).                                                             IMPRESSION:   1.  No acute abnormality. No cause of chest pain is evident.  2.  A few small lung nodules without significant change.  Recommendations for one or multiple incidental lung nodules < 6mm :    Low risk patients: No routine follow-up.    High risk patients: Optional follow-up CT at 12 months; if unchanged, no further follow-up.    ECHO 5/4/2022  Interpretation Summary  Left ventricular size, wall motion and function are normal. The ejection  fraction is 55-60%.  There is borderline anterior, septal, and apical wall hypokinesis.  Normal right ventricle size and systolic function.  No hemodynamically significant valvular abnormalities on 2D or color flow  imaging.

## 2023-12-09 ENCOUNTER — HEALTH MAINTENANCE LETTER (OUTPATIENT)
Age: 53
End: 2023-12-09

## 2023-12-14 ENCOUNTER — OFFICE VISIT (OUTPATIENT)
Dept: CARDIOLOGY | Facility: CLINIC | Age: 53
End: 2023-12-14
Attending: GENERAL ACUTE CARE HOSPITAL
Payer: COMMERCIAL

## 2023-12-14 VITALS
BODY MASS INDEX: 22.32 KG/M2 | SYSTOLIC BLOOD PRESSURE: 114 MMHG | DIASTOLIC BLOOD PRESSURE: 54 MMHG | RESPIRATION RATE: 20 BRPM | OXYGEN SATURATION: 98 % | WEIGHT: 126 LBS | HEART RATE: 105 BPM

## 2023-12-14 DIAGNOSIS — E78.5 HYPERLIPIDEMIA WITH TARGET LDL LESS THAN 70: ICD-10-CM

## 2023-12-14 DIAGNOSIS — R07.9 CHEST PAIN, UNSPECIFIED TYPE: ICD-10-CM

## 2023-12-14 DIAGNOSIS — I30.0 ACUTE IDIOPATHIC PERICARDITIS: ICD-10-CM

## 2023-12-14 DIAGNOSIS — I25.10 CORONARY ARTERY DISEASE INVOLVING NATIVE CORONARY ARTERY OF NATIVE HEART WITHOUT ANGINA PECTORIS: ICD-10-CM

## 2023-12-14 DIAGNOSIS — Z76.0 ENCOUNTER FOR MEDICATION REFILL: ICD-10-CM

## 2023-12-14 DIAGNOSIS — J44.9 CHRONIC OBSTRUCTIVE PULMONARY DISEASE, UNSPECIFIED COPD TYPE (H): ICD-10-CM

## 2023-12-14 DIAGNOSIS — I10 ESSENTIAL HYPERTENSION: ICD-10-CM

## 2023-12-14 PROCEDURE — 99214 OFFICE O/P EST MOD 30 MIN: CPT | Performed by: INTERNAL MEDICINE

## 2023-12-14 RX ORDER — ATORVASTATIN CALCIUM 80 MG/1
80 TABLET, FILM COATED ORAL DAILY
Qty: 90 TABLET | Refills: 3 | Status: SHIPPED | OUTPATIENT
Start: 2023-12-14

## 2023-12-14 RX ORDER — COLCHICINE 0.6 MG/1
0.6 TABLET ORAL DAILY
Qty: 90 TABLET | Refills: 3 | Status: SHIPPED | OUTPATIENT
Start: 2023-12-14

## 2023-12-14 RX ORDER — HYDROCHLOROTHIAZIDE 12.5 MG/1
12.5 CAPSULE ORAL EVERY MORNING
Qty: 90 CAPSULE | Refills: 3 | Status: SHIPPED | OUTPATIENT
Start: 2023-12-14

## 2023-12-14 RX ORDER — METOPROLOL TARTRATE 25 MG/1
TABLET, FILM COATED ORAL
Qty: 180 TABLET | Refills: 3 | Status: SHIPPED | OUTPATIENT
Start: 2023-12-14

## 2023-12-14 NOTE — PROGRESS NOTES
[facility] [address], [place] [address]   [email or web address]   OFFICE: [phone]            Impression and Plan     1. Coronary artery disease. Kulwant has known coronary artery disease. Specifically, patient underwent coronary angiography with PCI with stent placement to proximal-mid left anterior descending coronary artery (2.75×32 and 4.0×8 mm Synergy drug-eluting stents).  ?  Kulwant underwent nuclear perfusion imaging 1 March 2018 at [facility] which revealed no evidence of infarct or ischemia.  ?  Repeat ischemic work-up involving a regadenoson MRI 24 July 2019 return favorable and revealed no evidence of ischemia (see Cardiac Diagnostic section below).     She underwent repeat coronary angiography 5 May 2022 that revealed no significant obstructive coronary disease and widely patent stent in LAD.    Regadenoson nuclear perfusion study 17 April 2023 revealed no clear evidence of ischemia.     On interview today, Kulwant reports no chest pain symptoms reminiscent of her angina though she continues to have some rare atypical chest discomfort symptoms.     2.  Pericarditis.  Patient had been seen by my colleague, Dr. Ja Kramer in late July 2023 and felt to have findings consistent with pericarditis.  She was started on nonsteroidal therapy/colchicine.  She had follow-up with my colleague, Dr. Bora Ennis 14 September 2023 and continued colchicine therapy was recommended for at least 3 months, if not indefinitely.  Plan:  Continue colchicine 0.6 mg daily.    3.  SVT. Patient at time of initial consultation by me 27 December 2017 had been reported to have an SVT with heart rate of approximately 200-220 bpm. Patient apparently responded to adenosine. 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 Differential, however, would include Gustavo-Parkinson-White with concealed accessory  pathway (no delta waves seen on ECG) with orthodromic reciprocating tachycardia (ORT).     She has had no subjective or objective recurrence since that time.     4. Hypertension. Blood pressure is reasonable in the office today at 114/54 mmHg.  ?  5. Dyslipidemia.  Lipid profile 14 March 2023 revealed LDL 41 mg/dL and HDL 41 mg/dL.    6.  Severe obstructive lung disease.  Patient has been followed in the Pulmonary Clinic.    7.  Mild obstructive sleep apnea noted on polysomnogram 1 April 2022.     Plan on follow-up in 1 year.    35 minutes spent reviewing prior records (including documentation, laboratory studies, cardiac testing/imaging), interview with patient along with physical exam, planning, and subsequent documentation/crafting of note).           History of Present Illness    Once again I would like to thank you again for asking me to participate in the care of your patient, Kulwant Amin.  As you know, but to reiterate for my own records, Kulwant Amin is a 53 year old female with known coronary artery disease. Specifically, patient underwent coronary angiography 25 January 2018 with PCI with stent placement to proximal-mid left anterior descending coronary artery (2.75×32 and 4.0×8 mm Synergy drug-eluting stents).  ?  Kulwant underwent a nuclear perfusion study 22 January 2019 which was favorable and revealed no evidence of infarct or ischemia with normal ejection fraction of 70%.     She underwent repeat coronary angiography 5 May 2022 that revealed no significant obstructive coronary disease and widely patent stent in LAD.     On interview today, Kulwant reports some occasional chest discomfort but not predictable with exertion and the like.  She states her breathing is comfortable.  No palpitations or lightheadedness.    Further review of systems is otherwise negative/noncontributory (medical record and 13 point review of systems reviewed as well and pertinent positives noted).         Cardiac Diagnostics     "  Echocardiogram 25 July 2023:  Normal left ventricular size and systolic performance with ejection fraction of 55-60%.  No significant valvular heart disease.  Right ventricle not well-visualized.  Normal left atrial size.  Right atrium not well-visualized.    Echocardiogram 5 May 2022:  Normal left ventricular size and systolic performance with ejection fraction of 55 to 60%.  \"Borderline\" anterior, septal, and apical hypokinesis.  Normal right ventricular size and systolic performance.  Normal atrial dimensions.    Echocardiogram 14 January 2021:  Normal left ventricular size and systolic performance with ejection fraction of 55 to 60%.  No significant valvular heart disease.  Normal right ventricular size and systolic performance.  Normal atrial dimensions.  When compared to prior echocardiogram dated 14 August 2020, no significant change.    Echocardiogram 14 August 2020 (personally reviewed):  Normal left ventricular size and systolic performance with a visually estimated ejection fraction of 55-60%.   No significant valvular heart disease is identified on this study.   Normal right ventricular size and systolic performance.     Echocardiogram 23 May 2019:  Normal left ventricular size and systolic performance with ejection fraction of 65 to 70%.  No significant valvular heart disease.  Normal right ventricular size and systolic performance.  Normal atrial dimensions.    Echocardiogram 25 January 2018:  Limited echocardiogram.  Normal left ventricular size and systolic performance with ejection fraction of 60%.  No pericardial effusion.    Echocardiogram 27 December 2017:  Normal left ventricular size and systolic performance with ejection fraction 65-70%.  No significant valvular heart disease.  Normal right ventricular size and systolic performance  Normal atrial dimensions.    Echocardiogram 7 July 2014:  Normal left ventricular size and systolic performance with ejection fraction of 55-60%.  No significant " valvular heart disease.  Normal right ventricular size and systolic performance.  Normal atrial dimensions.    Regadenoson nuclear perfusion study 17 April 2023:  No evidence of infarct or ischemia.  Normal left ventricular systolic performance with ejection fraction greater than 70%.  Stress to rest cavity ratio is 1.38.  TID is present quantitatively but not visually.  This is a nonspecific finding that can indicate the presence of subendocardial ischemia and clinical correlation recommended.    Regadenoson MRI 24 July 2019:  Regadenoson stress cardiac MRI is negative for inducible myocardial ischemia.   Regadenoson stress ECG is negative for inducible myocardial ischemia.  No previous myocardial infarction is identified.  Small left ventricular size, wall thickness and function. The calculated left ventricular ejection fraction is 69%.  Normal right ventricular size and function.  No obvious valvular disease.    Nuclear perfusion imaging study 22 January 2019:  No evidence of infarct or ischemia.  Normal left ventricular systolic performance with ejection fraction of 70%.    Nuclear perfusion imaging 1 March 2018:  No evidence of infarct or ischemia.  Normal left ventricular systolic performance with ejection fraction greater than 75%.    Nuclear perfusion imaging 28 December 2017 (pre-coronary angiogram):  No evidence of infarct or ischemia.  Increased stress to rest E ratio 1.46 possibly representing multivessel disease.  Normal left ventricular systolic performance with ejection fraction of 75%.    Coronary angiogram 5 May 2022:  Left main coronary artery: Normal.  Left anterior descending coronary artery: No significant obstructive disease with widely patent stent.  Circumflex coronary artery: Proximal 30% stenosis.  Right coronary artery: Dominant vessel with 25% distal stenosis.    Coronary angiogram 25 January 2018:  Left Main coronary artery: Normal.  Left anterior descending coronary artery: Proximal-mid  70% stenosis at diagonal bifurcation with fractional flow reserve of 0.80. 25 January 2018 with  Circumflex coronary artery: Mild disease.  Right coronary artery: Normal.  Status post PCI with stent placement to proximal-mid left anterior descending coronary artery (2.75×32 and 4.0×8 mm Synergy drug-eluting stents).    Ambulatory monitor x12 days October 2022:  Essentially normal multi day patient activated monitor.  Patient's symptomatic recordings correlated with normal sinus rhythm.  No sustained atrial or ventricular tachyarrhythmia.  No profound bradycardia or significant pauses.    Holter monitor 13 August 2019:  Normal Holter monitor.    Holter monitor 22 January 2019:  Normal Holter.    30 day event recorder 20 March 2018 through 17 April 2018:  Normal 24-hour Holter monitor.  Symptoms correlating with sinus rhythm.     Twelve-lead ECG (personally reviewed) 5 February 2023: Sinus rhythm.  Incomplete right branch block.            Physical Examination       /54 (BP Location: Right arm, Patient Position: Sitting, Cuff Size: Adult Regular)   Pulse 105   Resp 20   Wt 57.2 kg (126 lb)   SpO2 98%   BMI 22.32 kg/m          Wt Readings from Last 3 Encounters:   12/14/23 57.2 kg (126 lb)   12/07/23 56.7 kg (125 lb)   12/05/23 56.2 kg (123 lb 14.4 oz)       The patient is alert and oriented times three. Sclerae are anicteric. Mucosal membranes are moist. Jugular venous pressure is normal. No significant adenopathy/thyromegally appreciated. Lungs are clear with good expansion. On cardiovascular exam, the patient has a regular S1 and S2. Abdomen is soft and non-tender. Extremities reveal no clubbing, cyanosis, or edema.         Family History/Social History/Risk Factors   Patient does not smoke.  Family history reviewed, and family history includes Other - See Comments in her mother; Ulcers in her father.          Medical History  Surgical History Family History Social History   Past Medical History:    Diagnosis Date    Acute asthma exacerbation 2020    Anxiety     Arthritis     Asthma exacerbation 2015    Chronic obstructive pulmonary disease, unspecified COPD type (H)     COPD (chronic obstructive pulmonary disease) (H)     Coronary artery disease due to lipid rich plaque     Depression     Epigastric pain 12/15/2021    Essential hypertension     GERD (gastroesophageal reflux disease)     Infection due to  novel coronavirus 2022    positive with COVID-19 on January 3, 2022    Latent tuberculosis 2019    Microcytic anemia 2019    S/P coronary artery stent placement 2018    TB lung, latent     9 mos INH     Past Surgical History:   Procedure Laterality Date    CORONARY STENT PLACEMENT      CV CORONARY ANGIOGRAM N/A 2018    Procedure: Coronary Angiogram;  Surgeon: Angelo Serrano MD;  Location: [facility] Cath Lab;  Service:     CV CORONARY ANGIOGRAM N/A 2022    Procedure: Coronary Angiogram;  Surgeon: Irwin Cano MD;  Location: [facility] CATH LAB CV    CV CORONARY ANGIOGRAM  2022    Procedure: ;  Surgeon: Irwin Cano MD;  Location: [facility] CATH LAB CV    WI ESOPHAGOGASTRODUODENOSCOPY TRANSORAL DIAGNOSTIC N/A 12/10/2018    Procedure: ESOPHAGOGASTRODUODENOSCOPY (EGD);  Surgeon: Eddie Renteria MD;  Location: [facility];  Service: Gastroenterology    WI ESOPHAGOGASTRODUODENOSCOPY TRANSORAL DIAGNOSTIC N/A 12/3/2020    Procedure: ESOPHAGOGASTRODUODENOSCOPY (EGD) with biospies ;  Surgeon: Avi Crow MD;  Location: [facility];  Service: Gastroenterology    [facility] COLONOSCOPY W/WO BRUSH/WASH N/A 12/10/2018    Procedure: COLONOSCOPY with polypectomy using biopsy forceps;  Surgeon: Eddie Renteria MD;  Location: [facility];  Service: Gastroenterology     Family History   Problem Relation Age of Onset    Other - See Comments Mother          of an intestinal problem    Ulcers Father          of gastritis    Breast Cancer No  family hx of         Social History     Socioeconomic History    Marital status:      Spouse name: Not on file    Number of children: Not on file    Years of education: Not on file    Highest education level: Not on file   Occupational History    Not on file   Tobacco Use    Smoking status: Former     Packs/day: 1.00     Years: 30.00     Additional pack years: 0.00     Total pack years: 30.00     Types: Cigarettes     Quit date: 2003     Years since quittin.9     Passive exposure: Never    Smokeless tobacco: Never    Tobacco comments:     No passive exposure   Vaping Use    Vaping Use: Never used   Substance and Sexual Activity    Alcohol use: No    Drug use: No    Sexual activity: Yes     Partners: Male   Other Topics Concern    Parent/sibling w/ CABG, MI or angioplasty before 65F 55M? Not Asked   Social History Narrative    2017 The patient lives with her daughter-in-law (who is present), , son, and 2 grandchildren (total of 6 people). Immigrant.     Social Determinants of Health     Financial Resource Strain: Low Risk  (2023)    Financial Resource Strain     Within the past 12 months, have you or your family members you live with been unable to get utilities (heat, electricity) when it was really needed?: No   Food Insecurity: Low Risk  (2023)    Food Insecurity     Within the past 12 months, did you worry that your food would run out before you got money to buy more?: No     Within the past 12 months, did the food you bought just not last and you didn t have money to get more?: No   Transportation Needs: Low Risk  (2023)    Transportation Needs     Within the past 12 months, has lack of transportation kept you from medical appointments, getting your medicines, non-medical meetings or appointments, work, or from getting things that you need?: No   Physical Activity: Not on file   Stress: Not on file   Social Connections: Not on file   Interpersonal Safety: Not on file    Housing Stability: Low Risk  (12/5/2023)    Housing Stability     Do you have housing? : Yes     Are you worried about losing your housing?: No           Medications  Allergies   Current Outpatient Medications   Medication Sig Dispense Refill    acetaminophen (TYLENOL) 500 MG tablet TAKE 1 PILL BY MOUTH EVERY 6 HOURS AS NEEDED FOR PAIN 100 tablet 10    albuterol (PROAIR HFA/PROVENTIL HFA/VENTOLIN HFA) 108 (90 Base) MCG/ACT inhaler Inhale 2 puffs into the lungs every 4 hours as needed for shortness of breath 18 g 11    albuterol (PROVENTIL) (2.5 MG/3ML) 0.083% neb solution Take 1 vial (2.5 mg) by nebulization every 6 hours as needed for shortness of breath, wheezing or cough 240 mL 11    amitriptyline (ELAVIL) 50 MG tablet Take 1 tablet (50 mg) by mouth At Bedtime 90 tablet 1    ASPIRIN LOW DOSE 81 MG EC tablet TAKE 1 TABLET (81 MG TOTAL) BY MOUTH DAILY. 90 tablet 3    atorvastatin (LIPITOR) 80 MG tablet TAKE 1 TABLET (80 MG TOTAL) BY MOUTH AT BEDTIME FOR CHOLESTEROL 90 tablet 0    B-D U/F 31G X 8 MM insulin pen needle USE ONE PEN NEEDLE DAILY AS NEEDED FOR  each 1    benzonatate (TESSALON) 100 MG capsule Take 1 capsule (100 mg) by mouth 2 times daily as needed for cough 100 capsule 1    calcium carbonate (TUMS) 500 MG chewable tablet Take 1 tablet (500 mg) by mouth 2 times daily as needed for heartburn 30 tablet 0    colchicine (COLCRYS) 0.6 MG tablet Take 1 tablet (0.6 mg) by mouth daily 90 tablet 3    Continuous Blood Gluc Sensor (FREESTYLE MONICA 2 SENSOR) [facility] 1 EACH EVERY 14 DAYS USE 1 SENSOR EVERY 14 DAYS. USE TO READ BLOOD SUGARS PER 'S INSTRUCTIONS. 2 each 11    CONTOUR NEXT TEST test strip USE 1 EACH AS DIRECTED 3 (THREE) TIMES A DAY. 50 strip 11    cyclobenzaprine (FLEXERIL) 5 MG tablet Take 5 mg by mouth 3 times daily as needed for muscle spasms      dextromethorphan (DELSYM) 30 MG/5ML liquid Take 10 mLs (60 mg) by mouth 2 times daily as needed for cough 178 mL 0    diclofenac  (VOLTAREN) 1 % topical gel Apply 4 g topically 4 times daily 350 g 3    docusate sodium (COLACE) 100 MG capsule Take 2 capsules (200 mg) by mouth 2 times daily as needed for constipation 90 capsule 3    dupilumab (DUPIXENT) 300 MG/2ML prefilled pen Inject 2 mLs (300 mg) Subcutaneous every 14 days (Patient taking differently: Inject 300 mg Subcutaneous every 14 days Using every 30 days) 4 mL 5    FLUoxetine (PROZAC) 10 MG capsule TAKE 1 CAPSULE (10 MG) BY MOUTH DAILY 90 capsule 2    fluticasone-vilanterol (BREO ELLIPTA) 200-25 MCG/ACT inhaler Inhale 1 puff into the lungs daily 60 each 11    gabapentin (NEURONTIN) 300 MG capsule Take 2 capsules (600 mg) by mouth 3 times daily 540 capsule 3    galcanezumab-gnlm (EMGALITY) 120 MG/ML injection Inject 2 mLs (240 mg) Subcutaneous every 28 days Loading dose. 2 mL 0    galcanezumab-gnlm (EMGALITY) 120 MG/ML injection Inject 1 mL (120 mg) Subcutaneous every 28 days 1 mL 3    hydrochlorothiazide (MICROZIDE) 12.5 MG capsule TAKE 1 CAPSULE (12.5 MG TOTAL) BY MOUTH DAILY FOR BLOOD PRESSURE 90 capsule 2    hydrocortisone (CORTAID) 1 % external cream Apply topically 2 times daily 60 g 0    ibuprofen (ADVIL/MOTRIN) 600 MG tablet Take 600 mg by mouth every 6 hours as needed for moderate pain      insulin aspart (NOVOLOG PEN) 100 UNIT/ML pen Inject 16 Units Subcutaneous 2 times daily (with meals) 15 mL 3    insulin glargine (LANTUS PEN) 100 UNIT/ML pen Inject 44 Units Subcutaneous every morning 45 mL 3    insulin pen needle (32G X 4 MM) 32G X 4 MM miscellaneous Use one pen needles daily or as directed. 200 each 11    loratadine (CLARITIN) 10 MG tablet Take 10 mg by mouth daily      meclizine (ANTIVERT) 12.5 MG tablet TAKE 2 TABLETS (25 MG) BY MOUTH 3 TIMES DAILY AS NEEDED FOR DIZZINESS 90 tablet 0    metFORMIN (GLUCOPHAGE XR) 500 MG 24 hr tablet Take 1 tablet (500 mg) by mouth 2 times daily (with meals) 180 tablet 3    metoprolol tartrate (LOPRESSOR) 25 MG tablet TAKE 1 TABLET (25 MG  TOTAL) BY MOUTH 2 (TWO) TIMES A DAY FOR BLOOD PRESSURE 180 tablet 3    montelukast (SINGULAIR) 10 MG tablet Take 1 tablet (10 mg) by mouth At Bedtime 30 tablet 11    omeprazole (PRILOSEC) 40 MG DR capsule Take 1 capsule (40 mg) by mouth daily 90 capsule 3    Polyethylene Glycol 3350 (PEG 3350) 17 GM/SCOOP POWD MIX 17 GRAMS WITH LIQUID AND DRINK ONCE DAILY FOR CONSTIPATION 1530 g 3    sucralfate (CARAFATE) 1 GM/10ML suspension Take 10 mLs (1 g) by mouth 4 times daily 3600 mL 1    tiotropium (SPIRIVA RESPIMAT) 2.5 MCG/ACT inhaler Inhale 2 puffs into the lungs daily 4 g 11     No Known Allergies       Lab Results    Chemistry/lipid CBC Cardiac Enzymes/BNP/TSH/INR   Recent Labs   Lab Test 03/14/23  1140   CHOL 125   HDL 41*   LDL 41   TRIG 214*     Recent Labs   Lab Test 03/14/23  1140 02/16/23  1137 03/25/22  1200   LDL 41 45 54     Recent Labs   Lab Test 11/26/23  0902      POTASSIUM 4.3   CHLORIDE 102   CO2 28   *   BUN 10.8   CR 0.56   GFRESTIMATED >90   FREDY 9.6     Recent Labs   Lab Test 11/26/23  0902 11/07/23  1046 10/01/23  0942   CR 0.56 0.59 0.53     Recent Labs   Lab Test 11/07/23  1046 07/10/23  0845 03/14/23  1140   A1C 11.1* 7.9* 7.4*          Recent Labs   Lab Test 11/26/23  0902   WBC 6.6   HGB 12.1   HCT 39.3   MCV 80        Recent Labs   Lab Test 11/26/23  0902 10/01/23  0942 08/24/23  1103   HGB 12.1 11.6* 11.3*    Recent Labs   Lab Test 09/06/22  0934 07/11/22  2340 07/11/22  2018   TROPONINI 0.02 <0.01 <0.01     Recent Labs   Lab Test 07/24/23  1644 07/11/22  2018 07/02/22  1929 01/03/22  1628   BNP  --  15 12 <10   NTBNPI <36  --   --   --      Recent Labs   Lab Test 11/07/23  1046   TSH 0.35     Recent Labs   Lab Test 09/06/22  0934 07/02/22  1929 05/27/21  1107   INR 1.02 1.03 0.98          Medications  Allergies   Current Outpatient Medications   Medication Sig Dispense Refill    acetaminophen (TYLENOL) 500 MG tablet TAKE 1 PILL BY MOUTH EVERY 6 HOURS AS NEEDED FOR PAIN 100  tablet 10    albuterol (PROAIR HFA/PROVENTIL HFA/VENTOLIN HFA) 108 (90 Base) MCG/ACT inhaler Inhale 2 puffs into the lungs every 4 hours as needed for shortness of breath 18 g 11    albuterol (PROVENTIL) (2.5 MG/3ML) 0.083% neb solution Take 1 vial (2.5 mg) by nebulization every 6 hours as needed for shortness of breath, wheezing or cough 240 mL 11    amitriptyline (ELAVIL) 50 MG tablet Take 1 tablet (50 mg) by mouth At Bedtime 90 tablet 1    ASPIRIN LOW DOSE 81 MG EC tablet TAKE 1 TABLET (81 MG TOTAL) BY MOUTH DAILY. 90 tablet 3    atorvastatin (LIPITOR) 80 MG tablet TAKE 1 TABLET (80 MG TOTAL) BY MOUTH AT BEDTIME FOR CHOLESTEROL 90 tablet 0    B-D U/F 31G X 8 MM insulin pen needle USE ONE PEN NEEDLE DAILY AS NEEDED FOR  each 1    benzonatate (TESSALON) 100 MG capsule Take 1 capsule (100 mg) by mouth 2 times daily as needed for cough 100 capsule 1    calcium carbonate (TUMS) 500 MG chewable tablet Take 1 tablet (500 mg) by mouth 2 times daily as needed for heartburn 30 tablet 0    colchicine (COLCRYS) 0.6 MG tablet Take 1 tablet (0.6 mg) by mouth daily 90 tablet 3    Continuous Blood Gluc Sensor (FREESTYLE MONICA 2 SENSOR) [facility] 1 EACH EVERY 14 DAYS USE 1 SENSOR EVERY 14 DAYS. USE TO READ BLOOD SUGARS PER 'S INSTRUCTIONS. 2 each 11    CONTOUR NEXT TEST test strip USE 1 EACH AS DIRECTED 3 (THREE) TIMES A DAY. 50 strip 11    cyclobenzaprine (FLEXERIL) 5 MG tablet Take 5 mg by mouth 3 times daily as needed for muscle spasms      dextromethorphan (DELSYM) 30 MG/5ML liquid Take 10 mLs (60 mg) by mouth 2 times daily as needed for cough 178 mL 0    diclofenac (VOLTAREN) 1 % topical gel Apply 4 g topically 4 times daily 350 g 3    docusate sodium (COLACE) 100 MG capsule Take 2 capsules (200 mg) by mouth 2 times daily as needed for constipation 90 capsule 3    dupilumab (DUPIXENT) 300 MG/2ML prefilled pen Inject 2 mLs (300 mg) Subcutaneous every 14 days (Patient taking differently: Inject 300 mg  Subcutaneous every 14 days Using every 30 days) 4 mL 5    FLUoxetine (PROZAC) 10 MG capsule TAKE 1 CAPSULE (10 MG) BY MOUTH DAILY 90 capsule 2    fluticasone-vilanterol (BREO ELLIPTA) 200-25 MCG/ACT inhaler Inhale 1 puff into the lungs daily 60 each 11    gabapentin (NEURONTIN) 300 MG capsule Take 2 capsules (600 mg) by mouth 3 times daily 540 capsule 3    galcanezumab-gnlm (EMGALITY) 120 MG/ML injection Inject 2 mLs (240 mg) Subcutaneous every 28 days Loading dose. 2 mL 0    galcanezumab-gnlm (EMGALITY) 120 MG/ML injection Inject 1 mL (120 mg) Subcutaneous every 28 days 1 mL 3    hydrochlorothiazide (MICROZIDE) 12.5 MG capsule TAKE 1 CAPSULE (12.5 MG TOTAL) BY MOUTH DAILY FOR BLOOD PRESSURE 90 capsule 2    hydrocortisone (CORTAID) 1 % external cream Apply topically 2 times daily 60 g 0    ibuprofen (ADVIL/MOTRIN) 600 MG tablet Take 600 mg by mouth every 6 hours as needed for moderate pain      insulin aspart (NOVOLOG PEN) 100 UNIT/ML pen Inject 16 Units Subcutaneous 2 times daily (with meals) 15 mL 3    insulin glargine (LANTUS PEN) 100 UNIT/ML pen Inject 44 Units Subcutaneous every morning 45 mL 3    insulin pen needle (32G X 4 MM) 32G X 4 MM miscellaneous Use one pen needles daily or as directed. 200 each 11    loratadine (CLARITIN) 10 MG tablet Take 10 mg by mouth daily      meclizine (ANTIVERT) 12.5 MG tablet TAKE 2 TABLETS (25 MG) BY MOUTH 3 TIMES DAILY AS NEEDED FOR DIZZINESS 90 tablet 0    metFORMIN (GLUCOPHAGE XR) 500 MG 24 hr tablet Take 1 tablet (500 mg) by mouth 2 times daily (with meals) 180 tablet 3    metoprolol tartrate (LOPRESSOR) 25 MG tablet TAKE 1 TABLET (25 MG TOTAL) BY MOUTH 2 (TWO) TIMES A DAY FOR BLOOD PRESSURE 180 tablet 3    montelukast (SINGULAIR) 10 MG tablet Take 1 tablet (10 mg) by mouth At Bedtime 30 tablet 11    omeprazole (PRILOSEC) 40 MG DR capsule Take 1 capsule (40 mg) by mouth daily 90 capsule 3    Polyethylene Glycol 3350 (PEG 3350) 17 GM/SCOOP POWD MIX 17 GRAMS WITH LIQUID AND  DRINK ONCE DAILY FOR CONSTIPATION 1530 g 3    sucralfate (CARAFATE) 1 GM/10ML suspension Take 10 mLs (1 g) by mouth 4 times daily 3600 mL 1    tiotropium (SPIRIVA RESPIMAT) 2.5 MCG/ACT inhaler Inhale 2 puffs into the lungs daily 4 g 11      No Known Allergies       Lab Results   Lab Results   Component Value Date     11/26/2023    .3 06/18/2015    CO2 28 11/26/2023    CO2 22 09/06/2022    CO2 25.1 06/18/2015    BUN 10.8 11/26/2023    BUN 12 09/06/2022    BUN 14.1 06/18/2015     Lab Results   Component Value Date    WBC 6.6 11/26/2023    WBC 8.8 01/26/2015    HGB 12.1 11/26/2023    HGB 13.7 09/11/2015    HCT 39.3 11/26/2023    HCT 41.8 09/11/2015    MCV 80 11/26/2023    MCV 80.2 09/11/2015     11/26/2023     01/26/2015     Lab Results   Component Value Date    CHOL 125 03/14/2023    CHOL 212.0 02/19/2014    TRIG 214 03/14/2023    TRIG 342.0 02/19/2014    HDL 41 03/14/2023    HDL 50.0 02/19/2014     Lab Results   Component Value Date    INR 1.02 09/06/2022     Lab Results   Component Value Date    BNP 15 07/11/2022     Lab Results   Component Value Date    TROPONINI 0.02 09/06/2022    TROPONINI <0.01 07/11/2022    TROPONINI <0.01 07/11/2022     Lab Results   Component Value Date    TSH 0.35 11/07/2023    TSH 0.18 05/06/2022    TSH 0.63 09/30/2015

## 2023-12-14 NOTE — LETTER
12/14/2023    Adrien Cardoza MD  [address] [place] [address] [address]  [place] [address]    RE: Kulwant Amin       Dear Colleague,     I had the pleasure of seeing Kulwant Amin in the [facility].         [facility]   [address], [place] [address]   [email or web address]   OFFICE: [phone]            Impression and Plan     1. Coronary artery disease. Kulwant has known coronary artery disease. Specifically, patient underwent coronary angiography with PCI with stent placement to proximal-mid left anterior descending coronary artery (2.75×32 and 4.0×8 mm Synergy drug-eluting stents).  ?  Kulwant underwent nuclear perfusion imaging 1 March 2018 at [facility] which revealed no evidence of infarct or ischemia.  ?  Repeat ischemic work-up involving a regadenoson MRI 24 July 2019 return favorable and revealed no evidence of ischemia (see Cardiac Diagnostic section below).     She underwent repeat coronary angiography 5 May 2022 that revealed no significant obstructive coronary disease and widely patent stent in LAD.    Regadenoson nuclear perfusion study 17 April 2023 revealed no clear evidence of ischemia.     On interview today, Kulwant reports no chest pain symptoms reminiscent of her angina though she continues to have some rare atypical chest discomfort symptoms.     2.  Pericarditis.  Patient had been seen by my colleague, Dr. Ja Kramer in late July 2023 and felt to have findings consistent with pericarditis.  She was started on nonsteroidal therapy/colchicine.  She had follow-up with my colleague, Dr. Bora Ennis 14 September 2023 and continued colchicine therapy was recommended for at least 3 months, if not indefinitely.  Plan:  Continue colchicine 0.6 mg daily.    3.  SVT. Patient at time of initial consultation by me 27 December 2017 had been reported to have an SVT with heart rate of approximately 200-220 bpm. Patient apparently responded to adenosine.  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 Differential, however, would include Gustavo-Parkinson-White with concealed accessory pathway (no delta waves seen on ECG) with orthodromic reciprocating tachycardia (ORT).     She has had no subjective or objective recurrence since that time.     4. Hypertension. Blood pressure is reasonable in the office today at 114/54 mmHg.  ?  5. Dyslipidemia.  Lipid profile 14 March 2023 revealed LDL 41 mg/dL and HDL 41 mg/dL.    6.  Severe obstructive lung disease.  Patient has been followed in the Pulmonary Clinic.    7.  Mild obstructive sleep apnea noted on polysomnogram 1 April 2022.     Plan on follow-up in 1 year.    35 minutes spent reviewing prior records (including documentation, laboratory studies, cardiac testing/imaging), interview with patient along with physical exam, planning, and subsequent documentation/crafting of note).           History of Present Illness    Once again I would like to thank you again for asking me to participate in the care of your patient, Kulwant Amin.  As you know, but to reiterate for my own records, Kulwant Amin is a 53 year old female with known coronary artery disease. Specifically, patient underwent coronary angiography 25 January 2018 with PCI with stent placement to proximal-mid left anterior descending coronary artery (2.75×32 and 4.0×8 mm Synergy drug-eluting stents).  ?  Kulwant underwent a nuclear perfusion study 22 January 2019 which was favorable and revealed no evidence of infarct or ischemia with normal ejection fraction of 70%.     She underwent repeat coronary angiography 5 May 2022 that revealed no significant obstructive coronary disease and widely patent stent in LAD.     On interview today, Kulwant reports some occasional chest discomfort but not predictable with exertion and the like.  She states her breathing is comfortable.  No palpitations or  "lightheadedness.    Further review of systems is otherwise negative/noncontributory (medical record and 13 point review of systems reviewed as well and pertinent positives noted).         Cardiac Diagnostics      Echocardiogram 25 July 2023:  Normal left ventricular size and systolic performance with ejection fraction of 55-60%.  No significant valvular heart disease.  Right ventricle not well-visualized.  Normal left atrial size.  Right atrium not well-visualized.    Echocardiogram 5 May 2022:  Normal left ventricular size and systolic performance with ejection fraction of 55 to 60%.  \"Borderline\" anterior, septal, and apical hypokinesis.  Normal right ventricular size and systolic performance.  Normal atrial dimensions.    Echocardiogram 14 January 2021:  Normal left ventricular size and systolic performance with ejection fraction of 55 to 60%.  No significant valvular heart disease.  Normal right ventricular size and systolic performance.  Normal atrial dimensions.  When compared to prior echocardiogram dated 14 August 2020, no significant change.    Echocardiogram 14 August 2020 (personally reviewed):  Normal left ventricular size and systolic performance with a visually estimated ejection fraction of 55-60%.   No significant valvular heart disease is identified on this study.   Normal right ventricular size and systolic performance.     Echocardiogram 23 May 2019:  Normal left ventricular size and systolic performance with ejection fraction of 65 to 70%.  No significant valvular heart disease.  Normal right ventricular size and systolic performance.  Normal atrial dimensions.    Echocardiogram 25 January 2018:  Limited echocardiogram.  Normal left ventricular size and systolic performance with ejection fraction of 60%.  No pericardial effusion.    Echocardiogram 27 December 2017:  Normal left ventricular size and systolic performance with ejection fraction 65-70%.  No significant valvular heart disease.  Normal " right ventricular size and systolic performance  Normal atrial dimensions.    Echocardiogram 7 July 2014:  Normal left ventricular size and systolic performance with ejection fraction of 55-60%.  No significant valvular heart disease.  Normal right ventricular size and systolic performance.  Normal atrial dimensions.    Regadenoson nuclear perfusion study 17 April 2023:  No evidence of infarct or ischemia.  Normal left ventricular systolic performance with ejection fraction greater than 70%.  Stress to rest cavity ratio is 1.38.  TID is present quantitatively but not visually.  This is a nonspecific finding that can indicate the presence of subendocardial ischemia and clinical correlation recommended.    Regadenoson MRI 24 July 2019:  Regadenoson stress cardiac MRI is negative for inducible myocardial ischemia.   Regadenoson stress ECG is negative for inducible myocardial ischemia.  No previous myocardial infarction is identified.  Small left ventricular size, wall thickness and function. The calculated left ventricular ejection fraction is 69%.  Normal right ventricular size and function.  No obvious valvular disease.    Nuclear perfusion imaging study 22 January 2019:  No evidence of infarct or ischemia.  Normal left ventricular systolic performance with ejection fraction of 70%.    Nuclear perfusion imaging 1 March 2018:  No evidence of infarct or ischemia.  Normal left ventricular systolic performance with ejection fraction greater than 75%.    Nuclear perfusion imaging 28 December 2017 (pre-coronary angiogram):  No evidence of infarct or ischemia.  Increased stress to rest E ratio 1.46 possibly representing multivessel disease.  Normal left ventricular systolic performance with ejection fraction of 75%.    Coronary angiogram 5 May 2022:  Left main coronary artery: Normal.  Left anterior descending coronary artery: No significant obstructive disease with widely patent stent.  Circumflex coronary artery: Proximal  30% stenosis.  Right coronary artery: Dominant vessel with 25% distal stenosis.    Coronary angiogram 25 January 2018:  Left Main coronary artery: Normal.  Left anterior descending coronary artery: Proximal-mid 70% stenosis at diagonal bifurcation with fractional flow reserve of 0.80. 25 January 2018 with  Circumflex coronary artery: Mild disease.  Right coronary artery: Normal.  Status post PCI with stent placement to proximal-mid left anterior descending coronary artery (2.75×32 and 4.0×8 mm Synergy drug-eluting stents).    Ambulatory monitor x12 days October 2022:  Essentially normal multi day patient activated monitor.  Patient's symptomatic recordings correlated with normal sinus rhythm.  No sustained atrial or ventricular tachyarrhythmia.  No profound bradycardia or significant pauses.    Holter monitor 13 August 2019:  Normal Holter monitor.    Holter monitor 22 January 2019:  Normal Holter.    30 day event recorder 20 March 2018 through 17 April 2018:  Normal 24-hour Holter monitor.  Symptoms correlating with sinus rhythm.     Twelve-lead ECG (personally reviewed) 5 February 2023: Sinus rhythm.  Incomplete right branch block.            Physical Examination       /54 (BP Location: Right arm, Patient Position: Sitting, Cuff Size: Adult Regular)   Pulse 105   Resp 20   Wt 57.2 kg (126 lb)   SpO2 98%   BMI 22.32 kg/m          Wt Readings from Last 3 Encounters:   12/14/23 57.2 kg (126 lb)   12/07/23 56.7 kg (125 lb)   12/05/23 56.2 kg (123 lb 14.4 oz)       The patient is alert and oriented times three. Sclerae are anicteric. Mucosal membranes are moist. Jugular venous pressure is normal. No significant adenopathy/thyromegally appreciated. Lungs are clear with good expansion. On cardiovascular exam, the patient has a regular S1 and S2. Abdomen is soft and non-tender. Extremities reveal no clubbing, cyanosis, or edema.         Family History/Social History/Risk Factors   Patient does not smoke.   Family history reviewed, and family history includes Other - See Comments in her mother; Ulcers in her father.          Medical History  Surgical History Family History Social History   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    Microcytic anemia 11/17/2019    S/P coronary artery stent placement 02/02/2018    TB lung, latent     9 mos INH     Past Surgical History:   Procedure Laterality Date    CORONARY STENT PLACEMENT  2018    CV CORONARY ANGIOGRAM N/A 1/25/2018    Procedure: Coronary Angiogram;  Surgeon: Angelo Serrano MD;  Location: [facility];  Service:     CV CORONARY ANGIOGRAM N/A 5/5/2022    Procedure: Coronary Angiogram;  Surgeon: Irwin Cano MD;  Location: [facility] LAB CV    CV CORONARY ANGIOGRAM  5/5/2022    Procedure: ;  Surgeon: Irwin Cano MD;  Location: [facility] CV    [place] ESOPHAGOGASTRODUODENOSCOPY TRANSORAL DIAGNOSTIC N/A 12/10/2018    Procedure: ESOPHAGOGASTRODUODENOSCOPY (EGD);  Surgeon: Eddie Renteria MD;  Location: [facility];  Service: Gastroenterology    [place] ESOPHAGOGASTRODUODENOSCOPY TRANSORAL DIAGNOSTIC N/A 12/3/2020    Procedure: ESOPHAGOGASTRODUODENOSCOPY (EGD) with biospies ;  Surgeon: Avi Crow MD;  Location: [facility];  Service: Gastroenterology    [facility] COLONOSCOPY W/WO BRUSH/WASH N/A 12/10/2018    Procedure: COLONOSCOPY with polypectomy using biopsy forceps;  Surgeon: Eddie Renteria MD;  Location: [facility];   Service: Gastroenterology     Family History   Problem Relation Age of Onset    Other - See Comments Mother          of an intestinal problem    Ulcers Father          of gastritis    Breast Cancer No family hx of         Social History     Socioeconomic History    Marital status:      Spouse name: Not on file    Number of children: Not on file    Years of education: Not on file    Highest education level: Not on file   Occupational History    Not on file   Tobacco Use    Smoking status: Former     Packs/day: 1.00     Years: 30.00     Additional pack years: 0.00     Total pack years: 30.00     Types: Cigarettes     Quit date: 2003     Years since quittin.9     Passive exposure: Never    Smokeless tobacco: Never    Tobacco comments:     No passive exposure   Vaping Use    Vaping Use: Never used   Substance and Sexual Activity    Alcohol use: No    Drug use: No    Sexual activity: Yes     Partners: Male   Other Topics Concern    Parent/sibling w/ CABG, MI or angioplasty before 65F 55M? Not Asked   Social History Narrative    2017 The patient lives with her daughter-in-law (who is present), , son, and 2 grandchildren (total of 6 people). Immigrant.     Social Determinants of Health     Financial Resource Strain: Low Risk  (2023)    Financial Resource Strain     Within the past 12 months, have you or your family members you live with been unable to get utilities (heat, electricity) when it was really needed?: No   Food Insecurity: Low Risk  (2023)    Food Insecurity     Within the past 12 months, did you worry that your food would run out before you got money to buy more?: No     Within the past 12 months, did the food you bought just not last and you didn t have money to get more?: No   Transportation Needs: Low Risk  (2023)    Transportation Needs     Within the past 12 months, has lack of transportation kept you from medical appointments, getting your medicines,  non-medical meetings or appointments, work, or from getting things that you need?: No   Physical Activity: Not on file   Stress: Not on file   Social Connections: Not on file   Interpersonal Safety: Not on file   Housing Stability: Low Risk  (12/5/2023)    Housing Stability     Do you have housing? : Yes     Are you worried about losing your housing?: No           Medications  Allergies   Current Outpatient Medications   Medication Sig Dispense Refill    acetaminophen (TYLENOL) 500 MG tablet TAKE 1 PILL BY MOUTH EVERY 6 HOURS AS NEEDED FOR PAIN 100 tablet 10    albuterol (PROAIR HFA/PROVENTIL HFA/VENTOLIN HFA) 108 (90 Base) MCG/ACT inhaler Inhale 2 puffs into the lungs every 4 hours as needed for shortness of breath 18 g 11    albuterol (PROVENTIL) (2.5 MG/3ML) 0.083% neb solution Take 1 vial (2.5 mg) by nebulization every 6 hours as needed for shortness of breath, wheezing or cough 240 mL 11    amitriptyline (ELAVIL) 50 MG tablet Take 1 tablet (50 mg) by mouth At Bedtime 90 tablet 1    ASPIRIN LOW DOSE 81 MG EC tablet TAKE 1 TABLET (81 MG TOTAL) BY MOUTH DAILY. 90 tablet 3    atorvastatin (LIPITOR) 80 MG tablet TAKE 1 TABLET (80 MG TOTAL) BY MOUTH AT BEDTIME FOR CHOLESTEROL 90 tablet 0    B-D U/F 31G X 8 MM insulin pen needle USE ONE PEN NEEDLE DAILY AS NEEDED FOR  each 1    benzonatate (TESSALON) 100 MG capsule Take 1 capsule (100 mg) by mouth 2 times daily as needed for cough 100 capsule 1    calcium carbonate (TUMS) 500 MG chewable tablet Take 1 tablet (500 mg) by mouth 2 times daily as needed for heartburn 30 tablet 0    colchicine (COLCRYS) 0.6 MG tablet Take 1 tablet (0.6 mg) by mouth daily 90 tablet 3    Continuous Blood Gluc Sensor (FREESTYLE MONICA 2 SENSOR) MISC 1 EACH EVERY 14 DAYS USE 1 SENSOR EVERY 14 DAYS. USE TO READ BLOOD SUGARS PER 'S INSTRUCTIONS. 2 each 11    CONTOUR NEXT TEST test strip USE 1 EACH AS DIRECTED 3 (THREE) TIMES A DAY. 50 strip 11    cyclobenzaprine (FLEXERIL) 5 MG  tablet Take 5 mg by mouth 3 times daily as needed for muscle spasms      dextromethorphan (DELSYM) 30 MG/5ML liquid Take 10 mLs (60 mg) by mouth 2 times daily as needed for cough 178 mL 0    diclofenac (VOLTAREN) 1 % topical gel Apply 4 g topically 4 times daily 350 g 3    docusate sodium (COLACE) 100 MG capsule Take 2 capsules (200 mg) by mouth 2 times daily as needed for constipation 90 capsule 3    dupilumab (DUPIXENT) 300 MG/2ML prefilled pen Inject 2 mLs (300 mg) Subcutaneous every 14 days (Patient taking differently: Inject 300 mg Subcutaneous every 14 days Using every 30 days) 4 mL 5    FLUoxetine (PROZAC) 10 MG capsule TAKE 1 CAPSULE (10 MG) BY MOUTH DAILY 90 capsule 2    fluticasone-vilanterol (BREO ELLIPTA) 200-25 MCG/ACT inhaler Inhale 1 puff into the lungs daily 60 each 11    gabapentin (NEURONTIN) 300 MG capsule Take 2 capsules (600 mg) by mouth 3 times daily 540 capsule 3    galcanezumab-gnlm (EMGALITY) 120 MG/ML injection Inject 2 mLs (240 mg) Subcutaneous every 28 days Loading dose. 2 mL 0    galcanezumab-gnlm (EMGALITY) 120 MG/ML injection Inject 1 mL (120 mg) Subcutaneous every 28 days 1 mL 3    hydrochlorothiazide (MICROZIDE) 12.5 MG capsule TAKE 1 CAPSULE (12.5 MG TOTAL) BY MOUTH DAILY FOR BLOOD PRESSURE 90 capsule 2    hydrocortisone (CORTAID) 1 % external cream Apply topically 2 times daily 60 g 0    ibuprofen (ADVIL/MOTRIN) 600 MG tablet Take 600 mg by mouth every 6 hours as needed for moderate pain      insulin aspart (NOVOLOG PEN) 100 UNIT/ML pen Inject 16 Units Subcutaneous 2 times daily (with meals) 15 mL 3    insulin glargine (LANTUS PEN) 100 UNIT/ML pen Inject 44 Units Subcutaneous every morning 45 mL 3    insulin pen needle (32G X 4 MM) 32G X 4 MM miscellaneous Use one pen needles daily or as directed. 200 each 11    loratadine (CLARITIN) 10 MG tablet Take 10 mg by mouth daily      meclizine (ANTIVERT) 12.5 MG tablet TAKE 2 TABLETS (25 MG) BY MOUTH 3 TIMES DAILY AS NEEDED FOR DIZZINESS  90 tablet 0    metFORMIN (GLUCOPHAGE XR) 500 MG 24 hr tablet Take 1 tablet (500 mg) by mouth 2 times daily (with meals) 180 tablet 3    metoprolol tartrate (LOPRESSOR) 25 MG tablet TAKE 1 TABLET (25 MG TOTAL) BY MOUTH 2 (TWO) TIMES A DAY FOR BLOOD PRESSURE 180 tablet 3    montelukast (SINGULAIR) 10 MG tablet Take 1 tablet (10 mg) by mouth At Bedtime 30 tablet 11    omeprazole (PRILOSEC) 40 MG DR capsule Take 1 capsule (40 mg) by mouth daily 90 capsule 3    Polyethylene Glycol 3350 (PEG 3350) 17 GM/SCOOP POWD MIX 17 GRAMS WITH LIQUID AND DRINK ONCE DAILY FOR CONSTIPATION 1530 g 3    sucralfate (CARAFATE) 1 GM/10ML suspension Take 10 mLs (1 g) by mouth 4 times daily 3600 mL 1    tiotropium (SPIRIVA RESPIMAT) 2.5 MCG/ACT inhaler Inhale 2 puffs into the lungs daily 4 g 11     No Known Allergies       Lab Results    Chemistry/lipid CBC Cardiac Enzymes/BNP/TSH/INR   Recent Labs   Lab Test 03/14/23  1140   CHOL 125   HDL 41*   LDL 41   TRIG 214*     Recent Labs   Lab Test 03/14/23  1140 02/16/23  1137 03/25/22  1200   LDL 41 45 54     Recent Labs   Lab Test 11/26/23  0902      POTASSIUM 4.3   CHLORIDE 102   CO2 28   *   BUN 10.8   CR 0.56   GFRESTIMATED >90   FREDY 9.6     Recent Labs   Lab Test 11/26/23  0902 11/07/23  1046 10/01/23  0942   CR 0.56 0.59 0.53     Recent Labs   Lab Test 11/07/23  1046 07/10/23  0845 03/14/23  1140   A1C 11.1* 7.9* 7.4*          Recent Labs   Lab Test 11/26/23  0902   WBC 6.6   HGB 12.1   HCT 39.3   MCV 80        Recent Labs   Lab Test 11/26/23  0902 10/01/23  0942 08/24/23  1103   HGB 12.1 11.6* 11.3*    Recent Labs   Lab Test 09/06/22  0934 07/11/22  2340 07/11/22  2018   TROPONINI 0.02 <0.01 <0.01     Recent Labs   Lab Test 07/24/23  1644 07/11/22  2018 07/02/22 1929 01/03/22  1628   BNP  --  15 12 <10   NTBNPI <36  --   --   --      Recent Labs   Lab Test 11/07/23  1046   TSH 0.35     Recent Labs   Lab Test 09/06/22  0934 07/02/22 1929 05/27/21  1107   INR 1.02 1.03  0.98          Medications  Allergies   Current Outpatient Medications   Medication Sig Dispense Refill    acetaminophen (TYLENOL) 500 MG tablet TAKE 1 PILL BY MOUTH EVERY 6 HOURS AS NEEDED FOR PAIN 100 tablet 10    albuterol (PROAIR HFA/PROVENTIL HFA/VENTOLIN HFA) 108 (90 Base) MCG/ACT inhaler Inhale 2 puffs into the lungs every 4 hours as needed for shortness of breath 18 g 11    albuterol (PROVENTIL) (2.5 MG/3ML) 0.083% neb solution Take 1 vial (2.5 mg) by nebulization every 6 hours as needed for shortness of breath, wheezing or cough 240 mL 11    amitriptyline (ELAVIL) 50 MG tablet Take 1 tablet (50 mg) by mouth At Bedtime 90 tablet 1    ASPIRIN LOW DOSE 81 MG EC tablet TAKE 1 TABLET (81 MG TOTAL) BY MOUTH DAILY. 90 tablet 3    atorvastatin (LIPITOR) 80 MG tablet TAKE 1 TABLET (80 MG TOTAL) BY MOUTH AT BEDTIME FOR CHOLESTEROL 90 tablet 0    B-D U/F 31G X 8 MM insulin pen needle USE ONE PEN NEEDLE DAILY AS NEEDED FOR  each 1    benzonatate (TESSALON) 100 MG capsule Take 1 capsule (100 mg) by mouth 2 times daily as needed for cough 100 capsule 1    calcium carbonate (TUMS) 500 MG chewable tablet Take 1 tablet (500 mg) by mouth 2 times daily as needed for heartburn 30 tablet 0    colchicine (COLCRYS) 0.6 MG tablet Take 1 tablet (0.6 mg) by mouth daily 90 tablet 3    Continuous Blood Gluc Sensor (FREESTYLE MONICA 2 SENSOR) MISC 1 EACH EVERY 14 DAYS USE 1 SENSOR EVERY 14 DAYS. USE TO READ BLOOD SUGARS PER 'S INSTRUCTIONS. 2 each 11    CONTOUR NEXT TEST test strip USE 1 EACH AS DIRECTED 3 (THREE) TIMES A DAY. 50 strip 11    cyclobenzaprine (FLEXERIL) 5 MG tablet Take 5 mg by mouth 3 times daily as needed for muscle spasms      dextromethorphan (DELSYM) 30 MG/5ML liquid Take 10 mLs (60 mg) by mouth 2 times daily as needed for cough 178 mL 0    diclofenac (VOLTAREN) 1 % topical gel Apply 4 g topically 4 times daily 350 g 3    docusate sodium (COLACE) 100 MG capsule Take 2 capsules (200 mg) by mouth 2  times daily as needed for constipation 90 capsule 3    dupilumab (DUPIXENT) 300 MG/2ML prefilled pen Inject 2 mLs (300 mg) Subcutaneous every 14 days (Patient taking differently: Inject 300 mg Subcutaneous every 14 days Using every 30 days) 4 mL 5    FLUoxetine (PROZAC) 10 MG capsule TAKE 1 CAPSULE (10 MG) BY MOUTH DAILY 90 capsule 2    fluticasone-vilanterol (BREO ELLIPTA) 200-25 MCG/ACT inhaler Inhale 1 puff into the lungs daily 60 each 11    gabapentin (NEURONTIN) 300 MG capsule Take 2 capsules (600 mg) by mouth 3 times daily 540 capsule 3    galcanezumab-gnlm (EMGALITY) 120 MG/ML injection Inject 2 mLs (240 mg) Subcutaneous every 28 days Loading dose. 2 mL 0    galcanezumab-gnlm (EMGALITY) 120 MG/ML injection Inject 1 mL (120 mg) Subcutaneous every 28 days 1 mL 3    hydrochlorothiazide (MICROZIDE) 12.5 MG capsule TAKE 1 CAPSULE (12.5 MG TOTAL) BY MOUTH DAILY FOR BLOOD PRESSURE 90 capsule 2    hydrocortisone (CORTAID) 1 % external cream Apply topically 2 times daily 60 g 0    ibuprofen (ADVIL/MOTRIN) 600 MG tablet Take 600 mg by mouth every 6 hours as needed for moderate pain      insulin aspart (NOVOLOG PEN) 100 UNIT/ML pen Inject 16 Units Subcutaneous 2 times daily (with meals) 15 mL 3    insulin glargine (LANTUS PEN) 100 UNIT/ML pen Inject 44 Units Subcutaneous every morning 45 mL 3    insulin pen needle (32G X 4 MM) 32G X 4 MM miscellaneous Use one pen needles daily or as directed. 200 each 11    loratadine (CLARITIN) 10 MG tablet Take 10 mg by mouth daily      meclizine (ANTIVERT) 12.5 MG tablet TAKE 2 TABLETS (25 MG) BY MOUTH 3 TIMES DAILY AS NEEDED FOR DIZZINESS 90 tablet 0    metFORMIN (GLUCOPHAGE XR) 500 MG 24 hr tablet Take 1 tablet (500 mg) by mouth 2 times daily (with meals) 180 tablet 3    metoprolol tartrate (LOPRESSOR) 25 MG tablet TAKE 1 TABLET (25 MG TOTAL) BY MOUTH 2 (TWO) TIMES A DAY FOR BLOOD PRESSURE 180 tablet 3    montelukast (SINGULAIR) 10 MG tablet Take 1 tablet (10 mg) by mouth At  Bedtime 30 tablet 11    omeprazole (PRILOSEC) 40 MG DR capsule Take 1 capsule (40 mg) by mouth daily 90 capsule 3    Polyethylene Glycol 3350 (PEG 3350) 17 GM/SCOOP POWD MIX 17 GRAMS WITH LIQUID AND DRINK ONCE DAILY FOR CONSTIPATION 1530 g 3    sucralfate (CARAFATE) 1 GM/10ML suspension Take 10 mLs (1 g) by mouth 4 times daily 3600 mL 1    tiotropium (SPIRIVA RESPIMAT) 2.5 MCG/ACT inhaler Inhale 2 puffs into the lungs daily 4 g 11      No Known Allergies       Lab Results   Lab Results   Component Value Date     11/26/2023    .3 06/18/2015    CO2 28 11/26/2023    CO2 22 09/06/2022    CO2 25.1 06/18/2015    BUN 10.8 11/26/2023    BUN 12 09/06/2022    BUN 14.1 06/18/2015     Lab Results   Component Value Date    WBC 6.6 11/26/2023    WBC 8.8 01/26/2015    HGB 12.1 11/26/2023    HGB 13.7 09/11/2015    HCT 39.3 11/26/2023    HCT 41.8 09/11/2015    MCV 80 11/26/2023    MCV 80.2 09/11/2015     11/26/2023     01/26/2015     Lab Results   Component Value Date    CHOL 125 03/14/2023    CHOL 212.0 02/19/2014    TRIG 214 03/14/2023    TRIG 342.0 02/19/2014    HDL 41 03/14/2023    HDL 50.0 02/19/2014     Lab Results   Component Value Date    INR 1.02 09/06/2022     Lab Results   Component Value Date    BNP 15 07/11/2022     Lab Results   Component Value Date    TROPONINI 0.02 09/06/2022    TROPONINI <0.01 07/11/2022    TROPONINI <0.01 07/11/2022     Lab Results   Component Value Date    TSH 0.35 11/07/2023    TSH 0.18 05/06/2022    TSH 0.63 09/30/2015                      Thank you for allowing me to participate in the care of your patient.      Sincerely,     Del Hirsch MD     [facility]  cc:   Bora Ennis MD  [address] [place] [address] [address]  [place] [address]

## 2024-01-04 ENCOUNTER — ALLIED HEALTH/NURSE VISIT (OUTPATIENT)
Dept: ALLERGY | Facility: CLINIC | Age: 54
End: 2024-01-04
Payer: COMMERCIAL

## 2024-01-04 DIAGNOSIS — J45.50 SEVERE PERSISTENT ASTHMA WITHOUT COMPLICATION (H): Primary | ICD-10-CM

## 2024-01-04 PROCEDURE — 96372 THER/PROPH/DIAG INJ SC/IM: CPT | Performed by: ALLERGY & IMMUNOLOGY

## 2024-01-08 ENCOUNTER — HOSPITAL ENCOUNTER (EMERGENCY)
Facility: HOSPITAL | Age: 54
Discharge: HOME | End: 2024-01-08
Attending: EMERGENCY MEDICINE | Admitting: EMERGENCY MEDICINE
Payer: COMMERCIAL

## 2024-01-08 VITALS
HEART RATE: 83 BPM | TEMPERATURE: 98.2 F | DIASTOLIC BLOOD PRESSURE: 75 MMHG | SYSTOLIC BLOOD PRESSURE: 126 MMHG | OXYGEN SATURATION: 98 % | RESPIRATION RATE: 16 BRPM

## 2024-01-08 DIAGNOSIS — R07.9 CHEST PAIN, UNSPECIFIED TYPE: ICD-10-CM

## 2024-01-08 LAB
ANION GAP SERPL CALCULATED.3IONS-SCNC: 9 MMOL/L (ref 7–15)
ATRIAL RATE - MUSE: 79 BPM
BUN SERPL-MCNC: 10.9 MG/DL (ref 6–20)
CALCIUM SERPL-MCNC: 9.1 MG/DL (ref 8.6–10)
CHLORIDE SERPL-SCNC: 105 MMOL/L (ref 98–107)
CREAT SERPL-MCNC: 0.48 MG/DL (ref 0.51–0.95)
DEPRECATED HCO3 PLAS-SCNC: 24 MMOL/L (ref 22–29)
DIASTOLIC BLOOD PRESSURE - MUSE: NORMAL MMHG
EGFRCR SERPLBLD CKD-EPI 2021: >90 ML/MIN/1.73M2
ERYTHROCYTE [DISTWIDTH] IN BLOOD BY AUTOMATED COUNT: 14.9 % (ref 10–15)
GLUCOSE SERPL-MCNC: 287 MG/DL (ref 70–99)
HCT VFR BLD AUTO: 36 % (ref 35–47)
HGB BLD-MCNC: 11.1 G/DL (ref 11.7–15.7)
HOLD SPECIMEN: NORMAL
HOLD SPECIMEN: NORMAL
INTERPRETATION ECG - MUSE: NORMAL
MCH RBC QN AUTO: 23.6 PG (ref 26.5–33)
MCHC RBC AUTO-ENTMCNC: 30.8 G/DL (ref 31.5–36.5)
MCV RBC AUTO: 77 FL (ref 78–100)
P AXIS - MUSE: 12 DEGREES
PLATELET # BLD AUTO: 221 10E3/UL (ref 150–450)
POTASSIUM SERPL-SCNC: 4.4 MMOL/L (ref 3.4–5.3)
PR INTERVAL - MUSE: 150 MS
QRS DURATION - MUSE: 98 MS
QT - MUSE: 390 MS
QTC - MUSE: 447 MS
R AXIS - MUSE: -19 DEGREES
RBC # BLD AUTO: 4.7 10E6/UL (ref 3.8–5.2)
SODIUM SERPL-SCNC: 138 MMOL/L (ref 135–145)
SYSTOLIC BLOOD PRESSURE - MUSE: NORMAL MMHG
T AXIS - MUSE: 37 DEGREES
TROPONIN T SERPL HS-MCNC: <6 NG/L
VENTRICULAR RATE- MUSE: 79 BPM
WBC # BLD AUTO: 6.1 10E3/UL (ref 4–11)

## 2024-01-08 PROCEDURE — 99284 EMERGENCY DEPT VISIT MOD MDM: CPT | Mod: 25

## 2024-01-08 PROCEDURE — 93005 ELECTROCARDIOGRAM TRACING: CPT | Performed by: EMERGENCY MEDICINE

## 2024-01-08 PROCEDURE — 96361 HYDRATE IV INFUSION ADD-ON: CPT

## 2024-01-08 PROCEDURE — 258N000003 HC RX IP 258 OP 636: Performed by: EMERGENCY MEDICINE

## 2024-01-08 PROCEDURE — 96375 TX/PRO/DX INJ NEW DRUG ADDON: CPT

## 2024-01-08 PROCEDURE — 80048 BASIC METABOLIC PNL TOTAL CA: CPT | Performed by: EMERGENCY MEDICINE

## 2024-01-08 PROCEDURE — 96374 THER/PROPH/DIAG INJ IV PUSH: CPT

## 2024-01-08 PROCEDURE — 85018 HEMOGLOBIN: CPT | Performed by: EMERGENCY MEDICINE

## 2024-01-08 PROCEDURE — 250N000011 HC RX IP 250 OP 636: Performed by: EMERGENCY MEDICINE

## 2024-01-08 PROCEDURE — 36415 COLL VENOUS BLD VENIPUNCTURE: CPT | Performed by: EMERGENCY MEDICINE

## 2024-01-08 PROCEDURE — 84484 ASSAY OF TROPONIN QUANT: CPT | Performed by: EMERGENCY MEDICINE

## 2024-01-08 RX ORDER — MORPHINE SULFATE 4 MG/ML
4 INJECTION, SOLUTION INTRAMUSCULAR; INTRAVENOUS ONCE
Status: COMPLETED | OUTPATIENT
Start: 2024-01-08 | End: 2024-01-08

## 2024-01-08 RX ORDER — ONDANSETRON 2 MG/ML
4 INJECTION INTRAMUSCULAR; INTRAVENOUS ONCE
Status: COMPLETED | OUTPATIENT
Start: 2024-01-08 | End: 2024-01-08

## 2024-01-08 RX ORDER — PREDNISONE 20 MG/1
40 TABLET ORAL DAILY
Qty: 8 TABLET | Refills: 0 | Status: SHIPPED | OUTPATIENT
Start: 2024-01-08 | End: 2024-01-12

## 2024-01-08 RX ADMIN — ONDANSETRON 4 MG: 2 INJECTION INTRAMUSCULAR; INTRAVENOUS at 03:58

## 2024-01-08 RX ADMIN — MORPHINE SULFATE 4 MG: 4 INJECTION, SOLUTION INTRAMUSCULAR; INTRAVENOUS at 03:51

## 2024-01-08 RX ADMIN — SODIUM CHLORIDE 1000 ML: 9 INJECTION, SOLUTION INTRAVENOUS at 03:49

## 2024-01-08 NOTE — ED PROVIDER NOTES
EMERGENCY DEPARTMENT ENCOUnter      NAME: Kulwant Amin  AGE: 54 year old female  YOB: 1970  MRN: [id number]  EVALUATION DATE & TIME: No admission date for patient encounter.    PCP: Adrien Cardoza    ED PROVIDER: Sunny Jeffries DO      Chief Complaint   Patient presents with    Chest Pain         FINAL IMPRESSION:  1. Chest pain, unspecified type          ED COURSE & MEDICAL DECISION MAKING:      3:19 AM Introduced myself to the patient, obtained history of present illness, and performed initial physical exam at this time.    4:18 AM Discussed discharge with the patient at this time, she is agreeable with this plan.       The patient presented to the emergency department today complaining of some chest discomfort.  She has a history of coronary artery disease.  No concerning findings on physical exam.  EKG here shows no ischemic changes or other concerning findings.  Laboratory testing was normal including a normal troponin.  The patient clinically appears well and I am not suspicious for PE, aortic dissection, or other serious intrathoracic processes.  She is PERC negative.  At this time she is comfortable with the plan for discharge home with close outpatient follow-up.  She has been advised to return for any worsening symptoms or other concerns.    Of note, daughter states that the patient has COPD and her breathing has been slightly more difficult than usual lately.  Her O2 saturations are normal here and she appears to be breathing comfortably.  I will prescribe a short course of steroid medication and have encouraged her to continue her previously prescribed inhalers.      Medical Decision Making  Obtained supplemental history:Supplemental history obtained?: Documented in chart and Family Member/Significant Other  Reviewed external records: External records reviewed?: No  Care impacted by chronic illness:Chronic Lung Disease, Diabetes, Hyperlipidemia, and Hypertension  Care significantly affected by  social determinants of health:N/A  Did you consider but not order tests?: Work up considered but not performed and documented in chart, if applicable  Did you interpret images independently?: Independent interpretation of ECG and images noted in documentation, when applicable.  Consultation discussion with other provider:Did you involve another provider (consultant, , pharmacy, etc.)?: No  Discharge. I prescribed additional prescription strength medication(s) as charted. See documentation for any additional details.    At the conclusion of the encounter I discussed the results of all of the tests and the disposition. The questions were answered. The patient or family acknowledged understanding and was agreeable with the care plan.         MEDICATIONS GIVEN IN THE EMERGENCY:  Medications   morphine (PF) injection 4 mg (4 mg Intravenous $Given 1/8/24 3694)   sodium chloride 0.9% BOLUS 1,000 mL (0 mLs Intravenous Stopped 1/8/24 9766)   ondansetron (ZOFRAN) injection 4 mg (4 mg Intravenous $Given 1/8/24 9881)       NEW PRESCRIPTIONS STARTED AT TODAY'S ER VISIT  Discharge Medication List as of 1/8/2024  4:34 AM        START taking these medications    Details   predniSONE (DELTASONE) 20 MG tablet Take 2 tablets (40 mg) by mouth daily for 4 days Take two tablets (= 40mg) each day for 4 (four) days, Disp-8 tablet, R-0, E-Prescribe                =================================================================    HPI        Kulwant Amin is a 54 year old female with a pertinent history of COPD, GERD, HLD, T2DM, CAD, HTN, and anxiety, who presents to this ED for evaluation of chest pain.     Per the patient's family member, the patient began experiencing central chest pain around 2200 tonight. Since onset, the pain has been progressively worsening. She endorses associated nausea and mild radiation of pain into her upper back. No fever or abdominal pain. No other complaints at this time.       PAST MEDICAL HISTORY:  Past  Medical History:   Diagnosis Date    Acute asthma exacerbation 01/06/2020    Anxiety     Arthritis     Asthma exacerbation 11/19/2015    Chronic obstructive pulmonary disease, unspecified COPD type (H)     COPD (chronic obstructive pulmonary disease) (H)     Coronary artery disease due to lipid rich plaque     Depression     Epigastric pain 12/15/2021    Essential hypertension     GERD (gastroesophageal reflux disease)     Infection due to 2019 novel coronavirus 01/03/2022    positive with COVID-19 on January 3, 2022    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    Microcytic anemia 11/17/2019    S/P coronary artery stent placement 02/02/2018    TB lung, latent     9 mos INH       PAST SURGICAL HISTORY:  Past Surgical History:   Procedure Laterality Date    CORONARY STENT PLACEMENT  2018    CV CORONARY ANGIOGRAM N/A 1/25/2018    Procedure: Coronary Angiogram;  Surgeon: Angelo Serrano MD;  Location: [facility] Cath Lab;  Service:     CV CORONARY ANGIOGRAM N/A 5/5/2022    Procedure: Coronary Angiogram;  Surgeon: Irwin Cano MD;  Location: [facility] CATH LAB CV    CV CORONARY ANGIOGRAM  5/5/2022    Procedure: ;  Surgeon: Irwin Cano MD;  Location: [facility] CATH LAB CV    CA ESOPHAGOGASTRODUODENOSCOPY TRANSORAL DIAGNOSTIC N/A 12/10/2018    Procedure: ESOPHAGOGASTRODUODENOSCOPY (EGD);  Surgeon: Eddie Renteria MD;  Location: [facility];  Service: Gastroenterology    CA ESOPHAGOGASTRODUODENOSCOPY TRANSORAL DIAGNOSTIC N/A 12/3/2020    Procedure: ESOPHAGOGASTRODUODENOSCOPY (EGD) with biospies ;  Surgeon: Avi Crow MD;  Location: [facility];  Service: Gastroenterology    [facility] COLONOSCOPY W/WO BRUSH/WASH N/A 12/10/2018    Procedure: COLONOSCOPY with polypectomy using biopsy forceps;  Surgeon: Eddie Renteria MD;  Location: [facility] GI;  Service: Gastroenterology           CURRENT MEDICATIONS:    predniSONE (DELTASONE) 20 MG tablet  acetaminophen (TYLENOL) 500 MG tablet  albuterol (PROAIR HFA/PROVENTIL  HFA/VENTOLIN HFA) 108 (90 Base) MCG/ACT inhaler  albuterol (PROVENTIL) (2.5 MG/3ML) 0.083% neb solution  amitriptyline (ELAVIL) 50 MG tablet  ASPIRIN LOW DOSE 81 MG EC tablet  atorvastatin (LIPITOR) 80 MG tablet  B-D U/F 31G X 8 MM insulin pen needle  benzonatate (TESSALON) 100 MG capsule  calcium carbonate (TUMS) 500 MG chewable tablet  colchicine (COLCRYS) 0.6 MG tablet  Continuous Blood Gluc Sensor (FREESTYLE MONICA 2 SENSOR) MISC  CONTOUR NEXT TEST test strip  cyclobenzaprine (FLEXERIL) 5 MG tablet  dextromethorphan (DELSYM) 30 MG/5ML liquid  diclofenac (VOLTAREN) 1 % topical gel  docusate sodium (COLACE) 100 MG capsule  dupilumab (DUPIXENT) 300 MG/2ML prefilled pen  FLUoxetine (PROZAC) 10 MG capsule  fluticasone-vilanterol (BREO ELLIPTA) 200-25 MCG/ACT inhaler  gabapentin (NEURONTIN) 300 MG capsule  galcanezumab-gnlm (EMGALITY) 120 MG/ML injection  galcanezumab-gnlm (EMGALITY) 120 MG/ML injection  hydrochlorothiazide (MICROZIDE) 12.5 MG capsule  hydrocortisone (CORTAID) 1 % external cream  ibuprofen (ADVIL/MOTRIN) 600 MG tablet  insulin aspart (NOVOLOG PEN) 100 UNIT/ML pen  insulin glargine (LANTUS PEN) 100 UNIT/ML pen  insulin pen needle (32G X 4 MM) 32G X 4 MM miscellaneous  loratadine (CLARITIN) 10 MG tablet  meclizine (ANTIVERT) 12.5 MG tablet  metFORMIN (GLUCOPHAGE XR) 500 MG 24 hr tablet  metoprolol tartrate (LOPRESSOR) 25 MG tablet  montelukast (SINGULAIR) 10 MG tablet  omeprazole (PRILOSEC) 40 MG DR capsule  Polyethylene Glycol 3350 (PEG 3350) 17 GM/SCOOP POWD  sucralfate (CARAFATE) 1 GM/10ML suspension  tiotropium (SPIRIVA RESPIMAT) 2.5 MCG/ACT inhaler        ALLERGIES:  No Known Allergies    FAMILY HISTORY:  Family History   Problem Relation Age of Onset    Other - See Comments Mother          of an intestinal problem    Ulcers Father          of gastritis    Breast Cancer No family hx of        SOCIAL HISTORY:   Social History     Socioeconomic History    Marital status:    Tobacco Use     Smoking status: Former     Packs/day: 1.00     Years: 30.00     Additional pack years: 0.00     Total pack years: 30.00     Types: Cigarettes     Quit date: 2003     Years since quittin.0     Passive exposure: Never    Smokeless tobacco: Never    Tobacco comments:     No passive exposure   Vaping Use    Vaping Use: Never used   Substance and Sexual Activity    Alcohol use: No    Drug use: No    Sexual activity: Yes     Partners: Male   Social History Narrative    2017 The patient lives with her daughter-in-law (who is present), , son, and 2 grandchildren (total of 6 people). Immigrant.     Social Determinants of Health     Financial Resource Strain: Low Risk  (2023)    Financial Resource Strain     Within the past 12 months, have you or your family members you live with been unable to get utilities (heat, electricity) when it was really needed?: No   Food Insecurity: Low Risk  (2023)    Food Insecurity     Within the past 12 months, did you worry that your food would run out before you got money to buy more?: No     Within the past 12 months, did the food you bought just not last and you didn t have money to get more?: No   Transportation Needs: Low Risk  (2023)    Transportation Needs     Within the past 12 months, has lack of transportation kept you from medical appointments, getting your medicines, non-medical meetings or appointments, work, or from getting things that you need?: No   Housing Stability: Low Risk  (2023)    Housing Stability     Do you have housing? : Yes     Are you worried about losing your housing?: No       VITALS:  Patient Vitals for the past 24 hrs:   BP Temp Temp src Pulse Resp SpO2   24 0415 126/75 -- -- 83 16 98 %   24 0400 119/69 -- -- 81 19 96 %   24 0350 112/66 -- -- 77 -- 98 %   24 0317 118/72 98.2  F (36.8  C) Oral 76 20 97 %       PHYSICAL EXAM    Constitutional:  Well developed, Well nourished,  HENT:   Normocephalic, Atraumatic, Oropharynx moist, Nose normal.   Eyes:  EOMI, Conjunctiva normal, No discharge.   Respiratory:  Normal breath sounds, No respiratory distress, No wheezing, No chest tenderness.   Cardiovascular:  Normal heart rate, Normal rhythm, No murmurs  GI:  Soft, No tenderness, No guarding, No CVA tenderness.   Musculoskeletal:  No tenderness to palpation or major deformities noted.   Extremities: No lower extremity edema.  Neurologic:  Alert & oriented x 3, No focal deficits noted.   Psychiatric:  Affect normal, Judgment normal, Mood normal.       LAB:  All pertinent labs reviewed and interpreted.  Results for orders placed or performed during the hospital encounter of 01/08/24   CBC with platelets   Result Value Ref Range    WBC Count 6.1 4.0 - 11.0 10e3/uL    RBC Count 4.70 3.80 - 5.20 10e6/uL    Hemoglobin 11.1 (L) 11.7 - 15.7 g/dL    Hematocrit 36.0 35.0 - 47.0 %    MCV 77 (L) 78 - 100 fL    MCH 23.6 (L) 26.5 - 33.0 pg    MCHC 30.8 (L) 31.5 - 36.5 g/dL    RDW 14.9 10.0 - 15.0 %    Platelet Count 221 150 - 450 10e3/uL   Basic metabolic panel   Result Value Ref Range    Sodium 138 135 - 145 mmol/L    Potassium 4.4 3.4 - 5.3 mmol/L    Chloride 105 98 - 107 mmol/L    Carbon Dioxide (CO2) 24 22 - 29 mmol/L    Anion Gap 9 7 - 15 mmol/L    Urea Nitrogen 10.9 6.0 - 20.0 mg/dL    Creatinine 0.48 (L) 0.51 - 0.95 mg/dL    GFR Estimate >90 >60 mL/min/1.73m2    Calcium 9.1 8.6 - 10.0 mg/dL    Glucose 287 (H) 70 - 99 mg/dL   Result Value Ref Range    Troponin T, High Sensitivity <6 <=14 ng/L   Extra Blue Top Tube   Result Value Ref Range    Hold Specimen JIC    Extra Red Top Tube   Result Value Ref Range    Hold Specimen JIC          EKG:    Normal sinus rhythm at 79 bpm.  Normal axis.  No signs of acute ischemia.  QRS 98 ms, QTc 447 ms.    I have independently reviewed and interpreted this EKG          I, Cassandra Diggs, am serving as a scribe to document services personally performed by Dr. Jeffries based on my  observation and the provider's statements to me. I, Sunny Jeffries, DO attest that Cassandra Diggs is acting in a scribe capacity, has observed my performance of the services and has documented them in accordance with my direction.    Sunny Jeffries DO  Emergency Medicine  [facility] EMERGENCY DEPARTMENT  [address] [place] [id number]  [phone]  Dept: [phone]       Sunny Jeffries MD  01/08/24 0522

## 2024-01-08 NOTE — ED NOTES
[facility]  Expected date:   Expected time:   Means of arrival:   Comments:  crash

## 2024-01-10 ENCOUNTER — HOSPITAL ENCOUNTER (EMERGENCY)
Facility: HOSPITAL | Age: 54
Discharge: HOME | End: 2024-01-10
Attending: EMERGENCY MEDICINE | Admitting: EMERGENCY MEDICINE
Payer: COMMERCIAL

## 2024-01-10 ENCOUNTER — APPOINTMENT (OUTPATIENT)
Dept: CT IMAGING | Facility: HOSPITAL | Age: 54
End: 2024-01-10
Attending: EMERGENCY MEDICINE
Payer: COMMERCIAL

## 2024-01-10 ENCOUNTER — APPOINTMENT (OUTPATIENT)
Dept: ULTRASOUND IMAGING | Facility: HOSPITAL | Age: 54
End: 2024-01-10
Attending: EMERGENCY MEDICINE
Payer: COMMERCIAL

## 2024-01-10 VITALS
OXYGEN SATURATION: 99 % | BODY MASS INDEX: 21.79 KG/M2 | DIASTOLIC BLOOD PRESSURE: 72 MMHG | WEIGHT: 123 LBS | RESPIRATION RATE: 16 BRPM | TEMPERATURE: 98.1 F | SYSTOLIC BLOOD PRESSURE: 120 MMHG | HEART RATE: 85 BPM

## 2024-01-10 DIAGNOSIS — R10.84 GENERALIZED ABDOMINAL PAIN: ICD-10-CM

## 2024-01-10 DIAGNOSIS — R07.9 CHEST PAIN, UNSPECIFIED TYPE: ICD-10-CM

## 2024-01-10 LAB
ALBUMIN SERPL BCG-MCNC: 4.2 G/DL (ref 3.5–5.2)
ALP SERPL-CCNC: 121 U/L (ref 40–150)
ALT SERPL W P-5'-P-CCNC: 33 U/L (ref 0–50)
ANION GAP SERPL CALCULATED.3IONS-SCNC: 11 MMOL/L (ref 7–15)
AST SERPL W P-5'-P-CCNC: 33 U/L (ref 0–45)
BASOPHILS # BLD AUTO: 0.1 10E3/UL (ref 0–0.2)
BASOPHILS NFR BLD AUTO: 1 %
BILIRUB DIRECT SERPL-MCNC: <0.2 MG/DL (ref 0–0.3)
BILIRUB SERPL-MCNC: 0.3 MG/DL
BUN SERPL-MCNC: 11.4 MG/DL (ref 6–20)
CALCIUM SERPL-MCNC: 10 MG/DL (ref 8.6–10)
CHLORIDE SERPL-SCNC: 100 MMOL/L (ref 98–107)
CREAT SERPL-MCNC: 0.62 MG/DL (ref 0.51–0.95)
DEPRECATED HCO3 PLAS-SCNC: 27 MMOL/L (ref 22–29)
EGFRCR SERPLBLD CKD-EPI 2021: >90 ML/MIN/1.73M2
EOSINOPHIL # BLD AUTO: 0.2 10E3/UL (ref 0–0.7)
EOSINOPHIL NFR BLD AUTO: 4 %
ERYTHROCYTE [DISTWIDTH] IN BLOOD BY AUTOMATED COUNT: 14.7 % (ref 10–15)
GLUCOSE SERPL-MCNC: 196 MG/DL (ref 70–99)
HCT VFR BLD AUTO: 39.1 % (ref 35–47)
HGB BLD-MCNC: 12 G/DL (ref 11.7–15.7)
HOLD SPECIMEN: NORMAL
IMM GRANULOCYTES # BLD: 0 10E3/UL
IMM GRANULOCYTES NFR BLD: 0 %
LIPASE SERPL-CCNC: 35 U/L (ref 13–60)
LYMPHOCYTES # BLD AUTO: 2 10E3/UL (ref 0.8–5.3)
LYMPHOCYTES NFR BLD AUTO: 36 %
MCH RBC QN AUTO: 23.6 PG (ref 26.5–33)
MCHC RBC AUTO-ENTMCNC: 30.7 G/DL (ref 31.5–36.5)
MCV RBC AUTO: 77 FL (ref 78–100)
MONOCYTES # BLD AUTO: 0.5 10E3/UL (ref 0–1.3)
MONOCYTES NFR BLD AUTO: 8 %
NEUTROPHILS # BLD AUTO: 2.7 10E3/UL (ref 1.6–8.3)
NEUTROPHILS NFR BLD AUTO: 51 %
NRBC # BLD AUTO: 0 10E3/UL
NRBC BLD AUTO-RTO: 0 /100
PLATELET # BLD AUTO: 234 10E3/UL (ref 150–450)
POTASSIUM SERPL-SCNC: 4.2 MMOL/L (ref 3.4–5.3)
PROT SERPL-MCNC: 6.9 G/DL (ref 6.4–8.3)
RBC # BLD AUTO: 5.09 10E6/UL (ref 3.8–5.2)
SODIUM SERPL-SCNC: 138 MMOL/L (ref 135–145)
TROPONIN T SERPL HS-MCNC: <6 NG/L
WBC # BLD AUTO: 5.5 10E3/UL (ref 4–11)

## 2024-01-10 PROCEDURE — 84484 ASSAY OF TROPONIN QUANT: CPT | Performed by: EMERGENCY MEDICINE

## 2024-01-10 PROCEDURE — 74174 CTA ABD&PLVS W/CONTRAST: CPT

## 2024-01-10 PROCEDURE — 93005 ELECTROCARDIOGRAM TRACING: CPT | Performed by: EMERGENCY MEDICINE

## 2024-01-10 PROCEDURE — 85025 COMPLETE CBC W/AUTO DIFF WBC: CPT | Performed by: EMERGENCY MEDICINE

## 2024-01-10 PROCEDURE — 80053 COMPREHEN METABOLIC PANEL: CPT | Performed by: FAMILY MEDICINE

## 2024-01-10 PROCEDURE — 36415 COLL VENOUS BLD VENIPUNCTURE: CPT | Performed by: EMERGENCY MEDICINE

## 2024-01-10 PROCEDURE — 250N000013 HC RX MED GY IP 250 OP 250 PS 637: Performed by: EMERGENCY MEDICINE

## 2024-01-10 PROCEDURE — 83690 ASSAY OF LIPASE: CPT | Performed by: FAMILY MEDICINE

## 2024-01-10 PROCEDURE — 250N000011 HC RX IP 250 OP 636: Performed by: EMERGENCY MEDICINE

## 2024-01-10 PROCEDURE — 99285 EMERGENCY DEPT VISIT HI MDM: CPT | Mod: 25

## 2024-01-10 PROCEDURE — 93971 EXTREMITY STUDY: CPT | Mod: LT

## 2024-01-10 RX ORDER — IOPAMIDOL 755 MG/ML
90 INJECTION, SOLUTION INTRAVASCULAR ONCE
Status: COMPLETED | OUTPATIENT
Start: 2024-01-10 | End: 2024-01-10

## 2024-01-10 RX ORDER — ACETAMINOPHEN 325 MG/1
650 TABLET ORAL ONCE
Status: COMPLETED | OUTPATIENT
Start: 2024-01-10 | End: 2024-01-10

## 2024-01-10 RX ORDER — ASPIRIN 81 MG/1
324 TABLET, CHEWABLE ORAL ONCE
Status: DISCONTINUED | OUTPATIENT
Start: 2024-01-10 | End: 2024-01-10

## 2024-01-10 RX ADMIN — ACETAMINOPHEN 650 MG: 325 TABLET ORAL at 13:55

## 2024-01-10 RX ADMIN — IOPAMIDOL 90 ML: 755 INJECTION, SOLUTION INTRAVENOUS at 14:16

## 2024-01-10 ASSESSMENT — ENCOUNTER SYMPTOMS
ABDOMINAL PAIN: 1
DYSURIA: 0
NAUSEA: 0
VOMITING: 0
FEVER: 0
ARTHRALGIAS: 1
DIARRHEA: 0
COUGH: 1

## 2024-01-10 NOTE — DISCHARGE INSTRUCTIONS
Read and follow the discharge instructions.    Continue taking your current medications.    Please call your doctor today to try to make an appointment for reevaluation.  Tell them that you are in the emergency department.    Return for worsening symptoms.

## 2024-01-10 NOTE — ED PROVIDER NOTES
EMERGENCY DEPARTMENT ENCOUNTER      NAME: Kulwant Amin  AGE: 54 year old female  YOB: 1970  MRN: [id number]  EVALUATION DATE & TIME: No admission date for patient encounter.    PCP: Adrien Cardoza    ED PROVIDER: Christy Soto M.D.      CHIEF COMPLAINT     Chief Complaint   Patient presents with    Chest Pain    Abdominal Pain         FINAL IMPRESSION:     1. Chest pain, unspecified type    2. Generalized abdominal pain          MEDICAL DECISION MAKING:       Pertinent Labs & Imaging studies reviewed. (See chart for details)    54 year old female presents to the Emergency Department for evaluation of pain, susi pain, left knee pain.    History  Supplemental history from daughter  External Record(s) review as documented below    Exam  Well appearing  In no distress regular rhythm no murmurs abdomen is soft tenderness palpation of the knee anterior tenderness no calf tenderness palpation no edema.    Differential Diagnosis include but not limited to acute coronary syndrome dissection intra-abdominal pathology obstruction DVT musculoskeletal among others.      Vital Signs: Reviewed  EKG: Sinus rhythm normal anterior progression normal axis  Imaging: I independently interpreted CT chest no consolidation..Formal read by radiologist.  Home meds: Reviewed  ED meds: Toradol  Fluids: tko  Labs:  K 4.2  Cr 0.62  Wbc 5.5  Hgb 12  platelets 234    Clinical Impression and Decision Making    54-year-old female presents complaining of chest pain and abdominal pain for the last 4 days.  Chest pain is nonexertional and not associated with nausea diaphoresis or near syncope.    This is the same pain that she was seen here on 1/8/2024 I reviewed those records.    Minimal pain is in the lower area.  Not associated with diarrhea dysuria melena or blood in the stool.  States her stools are hard.    State that she has been having left knee pain.    Per daughter she is mostly wheelchair-bound.    Exam she is well-appearing no  distress no murmurs benign abdominal exam without guarding or rebound.    Multiple etiologies considered.  CT chest and abdomen reveals no acute abnormality ultrasound of the legs reveals no evidence of DVT.    discussed with the daughter importance of follow-up primary care doctor.    Encouraged her to continue her regular medications which include insulin.    Patient discharged in stable condition.    In addition to the work out documented, I considered the following work up CT a runoff.  She has intact pulses there is no evidence of ischemia of the left lower extremity.           Medical Decision Making  Obtained supplemental history:Supplemental history obtained?: No  Reviewed external records: External records reviewed?: Documented in chart, Inpatient Record: [facility] ED 1/8/24, and Outpatient Record: [facility] 12/14/23  Care impacted by chronic illness:Chronic Lung Disease, Heart Disease, Hypertension, and Other: pericarditis, SVT  Care significantly affected by social determinants of health:N/A  Did you consider but not order tests?: Work up considered but not performed and documented in chart, if applicable  Did you interpret images independently?: Independent interpretation of ECG and images noted in documentation, when applicable.  Consultation discussion with other provider:Did you involve another provider (consultant, , pharmacy, etc.)?: No  Discharge. I prescribed additional prescription strength medication(s) as charted. See documentation for any additional details.      Review of External Records  Hospital/Clinic: [facility]  Date:12/14/23  Patient has CAD, history of pericarditis, SVT, hypertension, COPD    External Consultation        ED COURSE     10:28 AM I met with the patient for the initial interview and physical examination. Discussed plan for treatment and workup in the ED.     2:39 PM We discussed the plan for discharge  and the patient is agreeable. Reviewed supportive cares, symptomatic treatment, outpatient follow up, and reasons to return to the Emergency Department. All questions and concerns were addressed. Patient to be discharged by ED RN.      At the conclusion of the encounter I discussed the results of all of the tests and the disposition. The questions were answered. The patient and daughter acknowledged understanding and was agreeable with the care plan.         MEDICATIONS GIVEN IN THE EMERGENCY:     Medications   acetaminophen (TYLENOL) tablet 650 mg (650 mg Oral $Given 1/10/24 6846)   iopamidol (ISOVUE-370) solution 90 mL (90 mLs Intravenous $Given 1/10/24 1416)       NEW PRESCRIPTIONS STARTED AT TODAY'S ER VISIT     Discharge Medication List as of 1/10/2024  2:43 PM             =================================================================    HPI     Patient information was obtained from: patient     Use of :  Phone - wilmer Blum JEANNINE Amin is a 54 year old female who presents by personal vehicle with daughter.    Per chart review:   Patient was seen at [facility] ED on 1/8/24 for chest pain. EKG shows no ischemic changes or other concerning findings. Lab tests were normal, with a normal troponin. She was discharged in a stable condition with prednisone 40 mg x4 days.     Patient reports constant LLQ abdominal pain since yesterday night (1/9). Endorses intermittent chest pain x4 days, dizziness, and left leg pain. Patient was seen here for chest pain with no findings on 1/8. She notes a chronic cough and her stools have been harder than normal. Denies vomiting, diarrhea, dysuria, fever. Denies falls.     Patient usually walks with a cane, but is mostly in her wheelchair.       REVIEW OF SYSTEMS   Review of Systems   Constitutional:  Negative for fever.   Respiratory:  Positive for cough.    Cardiovascular:  Positive for chest pain.   Gastrointestinal:  Positive for  abdominal pain (LLQ). Negative for diarrhea, nausea and vomiting.   Genitourinary:  Negative for dysuria.   Musculoskeletal:  Positive for arthralgias (left leg pain).        PAST MEDICAL HISTORY:     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    Microcytic anemia 11/17/2019    S/P coronary artery stent placement 02/02/2018    TB lung, latent     9 mos INH       PAST SURGICAL HISTORY:     Past Surgical History:   Procedure Laterality Date    CORONARY STENT PLACEMENT  2018    CV CORONARY ANGIOGRAM N/A 1/25/2018    Procedure: Coronary Angiogram;  Surgeon: Angelo Serrano MD;  Location: [facility] Cath Lab;  Service:     CV CORONARY ANGIOGRAM N/A 5/5/2022    Procedure: Coronary Angiogram;  Surgeon: Irwin Cano MD;  Location: [facility] CATH LAB CV    CV CORONARY ANGIOGRAM  5/5/2022    Procedure: ;  Surgeon: Irwin Cano MD;  Location: [facility] CATH LAB CV    CT ESOPHAGOGASTRODUODENOSCOPY TRANSORAL DIAGNOSTIC N/A 12/10/2018    Procedure: ESOPHAGOGASTRODUODENOSCOPY (EGD);  Surgeon: Eddie Renteria MD;  Location: [facility];  Service: Gastroenterology    CT ESOPHAGOGASTRODUODENOSCOPY TRANSORAL DIAGNOSTIC N/A 12/3/2020    Procedure: ESOPHAGOGASTRODUODENOSCOPY (EGD) with biospies ;  Surgeon: Avi Crow MD;  Location: [facility] GI;  Service: Gastroenterology    [facility] COLONOSCOPY W/WO BRUSH/WASH N/A 12/10/2018    Procedure: COLONOSCOPY with polypectomy using biopsy forceps;  Surgeon:  Dexter, Eddie GUTIERREZ MD;  Location: [facility];  Service: Gastroenterology         CURRENT MEDICATIONS:   acetaminophen (TYLENOL) 500 MG tablet  albuterol (PROAIR HFA/PROVENTIL HFA/VENTOLIN HFA) 108 (90 Base) MCG/ACT inhaler  albuterol (PROVENTIL) (2.5 MG/3ML) 0.083% neb solution  amitriptyline (ELAVIL) 50 MG tablet  ASPIRIN LOW DOSE 81 MG EC tablet  atorvastatin (LIPITOR) 80 MG tablet  B-D U/F 31G X 8 MM insulin pen needle  benzonatate (TESSALON) 100 MG capsule  calcium carbonate (TUMS) 500 MG chewable tablet  colchicine (COLCRYS) 0.6 MG tablet  Continuous Blood Gluc Sensor (FREESTYLE MONICA 2 SENSOR) [facility]  CONTOUR NEXT TEST test strip  cyclobenzaprine (FLEXERIL) 5 MG tablet  dextromethorphan (DELSYM) 30 MG/5ML liquid  diclofenac (VOLTAREN) 1 % topical gel  docusate sodium (COLACE) 100 MG capsule  dupilumab (DUPIXENT) 300 MG/2ML prefilled pen  FLUoxetine (PROZAC) 10 MG capsule  fluticasone-vilanterol (BREO ELLIPTA) 200-25 MCG/ACT inhaler  gabapentin (NEURONTIN) 300 MG capsule  galcanezumab-gnlm (EMGALITY) 120 MG/ML injection  galcanezumab-gnlm (EMGALITY) 120 MG/ML injection  hydrochlorothiazide (MICROZIDE) 12.5 MG capsule  hydrocortisone (CORTAID) 1 % external cream  ibuprofen (ADVIL/MOTRIN) 600 MG tablet  insulin aspart (NOVOLOG PEN) 100 UNIT/ML pen  insulin glargine (LANTUS PEN) 100 UNIT/ML pen  insulin pen needle (32G X 4 MM) 32G X 4 MM miscellaneous  loratadine (CLARITIN) 10 MG tablet  meclizine (ANTIVERT) 12.5 MG tablet  metFORMIN (GLUCOPHAGE XR) 500 MG 24 hr tablet  metoprolol tartrate (LOPRESSOR) 25 MG tablet  montelukast (SINGULAIR) 10 MG tablet  omeprazole (PRILOSEC) 40 MG DR capsule  Polyethylene Glycol 3350 (PEG 3350) 17 GM/SCOOP POWD  predniSONE (DELTASONE) 20 MG tablet  sucralfate (CARAFATE) 1 GM/10ML suspension  tiotropium (SPIRIVA RESPIMAT) 2.5 MCG/ACT inhaler         ALLERGIES:   No Known Allergies    FAMILY HISTORY:     Family History   Problem Relation Age of Onset    Other - See Comments Mother           of an intestinal problem    Ulcers Father          of gastritis    Breast Cancer No family hx of        SOCIAL HISTORY:     Social History     Socioeconomic History    Marital status:    Tobacco Use    Smoking status: Former     Packs/day: 1.00     Years: 30.00     Additional pack years: 0.00     Total pack years: 30.00     Types: Cigarettes     Quit date: 2003     Years since quittin.0     Passive exposure: Never    Smokeless tobacco: Never    Tobacco comments:     No passive exposure   Vaping Use    Vaping Use: Never used   Substance and Sexual Activity    Alcohol use: No    Drug use: No    Sexual activity: Yes     Partners: Male   Social History Narrative    2017 The patient lives with her daughter-in-law (who is present), , son, and 2 grandchildren (total of 6 people). Immigrant.     Social Determinants of Health     Financial Resource Strain: Low Risk  (2023)    Financial Resource Strain     Within the past 12 months, have you or your family members you live with been unable to get utilities (heat, electricity) when it was really needed?: No   Food Insecurity: Low Risk  (2023)    Food Insecurity     Within the past 12 months, did you worry that your food would run out before you got money to buy more?: No     Within the past 12 months, did the food you bought just not last and you didn t have money to get more?: No   Transportation Needs: Low Risk  (2023)    Transportation Needs     Within the past 12 months, has lack of transportation kept you from medical appointments, getting your medicines, non-medical meetings or appointments, work, or from getting things that you need?: No   Housing Stability: Low Risk  (2023)    Housing Stability     Do you have housing? : Yes     Are you worried about losing your housing?: No       VITALS:   /72   Pulse 85   Temp 98.1  F (36.7  C)   Resp 16   Wt 55.8 kg (123 lb)   SpO2 99%   BMI 21.79 kg/m       PHYSICAL EXAM     Physical Exam  Vitals and nursing note reviewed.   Constitutional:       General: She is not in acute distress.     Appearance: She is well-developed. She is not ill-appearing or toxic-appearing.   Neurological:      Mental Status: She is alert.         Physical Exam   Constitutional: well-appearing, no distress. Wheelchair bound    Head: Atraumatic.     Nose: Nose normal.     Mouth/Throat: Oropharynx is clear and moist.     Eyes: EOM are normal. Pupils are equal, round, and reactive to light.     Ears: Bilateral pearly white tympanic membranes.    Neck: Normal range of motion. Neck supple.     Cardiovascular: Normal rate, regular rhythm and normal heart sounds.      Pulmonary/Chest: Normal effort  and breath sounds normal.     Abdominal: soft nontender.    Musculoskeletal: Normal range of motion.  Anterior knee tenderness palpation no calf tenderness palpation no swelling full range of motion no signs of infection.    Neurological: Moves upper and lower extremities equally.    Lymphatics: no edema    : NA    Skin: Skin is warm and dry.     Psychiatric: Normal mood and affect. Behavior is normal.       LAB:     All pertinent labs reviewed and interpreted.  Labs Ordered and Resulted from Time of ED Arrival to Time of ED Departure   BASIC METABOLIC PANEL - Abnormal       Result Value    Sodium 138      Potassium 4.2      Chloride 100      Carbon Dioxide (CO2) 27      Anion Gap 11      Urea Nitrogen 11.4      Creatinine 0.62      GFR Estimate >90      Calcium 10.0      Glucose 196 (*)    CBC WITH PLATELETS AND DIFFERENTIAL - Abnormal    WBC Count 5.5      RBC Count 5.09      Hemoglobin 12.0      Hematocrit 39.1      MCV 77 (*)     MCH 23.6 (*)     MCHC 30.7 (*)     RDW 14.7      Platelet Count 234      % Neutrophils 51      % Lymphocytes 36      % Monocytes 8      % Eosinophils 4      % Basophils 1      % Immature Granulocytes 0      NRBCs per 100 WBC 0      Absolute Neutrophils 2.7       Absolute Lymphocytes 2.0      Absolute Monocytes 0.5      Absolute Eosinophils 0.2      Absolute Basophils 0.1      Absolute Immature Granulocytes 0.0      Absolute NRBCs 0.0     TROPONIN T, HIGH SENSITIVITY - Normal    Troponin T, High Sensitivity <6     LIPASE - Normal    Lipase 35     HEPATIC FUNCTION PANEL - Normal    Protein Total 6.9      Albumin 4.2      Bilirubin Total 0.3      Alkaline Phosphatase 121      AST 33      ALT 33      Bilirubin Direct <0.20          RADIOLOGY:     Reviewed all pertinent imaging. Please see official radiology report.  CTA Chest Abdomen Pelvis w Contrast   Final Result   IMPRESSION:      1.  No acute aortic syndrome, acute pulmonary embolism or other vasculopathy.   2.  No acute lung, airway, or pleural inflammatory process.   3.  No acute process in the abdomen or pelvis.         US Lower Extremity Venous Duplex Left   Final Result   IMPRESSION:   1.  No deep venous thrombosis in the left lower extremity.           EKG:     EKG #1  Sinus rhythm normal anterior progression left axis deviation    Time: 069202    Ventricular rate 96 bmp  Axis normal  UT interval 144 ms  QRS duration 90 ms  QT//459 ms    Compared to previous EKG on 9/8/2024 heart rate was 79 then.  I have independently reviewed and interpreted the EKG(s) documented above.      PROCEDURES:     Procedures      I, Stefania Jansen, am serving as a scribe to document services personally performed by Dr. Soto based on my observation and the provider's statements to me. I, Christy Soto MD attest that Stefania Jansen is acting in a scribe capacity, has observed my performance of the services and has documented them in accordance with my direction.    Christy Soto M.D.  Emergency Medicine  [facility] EMERGENCY DEPARTMENT  [address] [id number] [phone]  Dept: [phone]       Christy Soto MD  01/10/24 1044

## 2024-01-10 NOTE — ED TRIAGE NOTES
Pt hx of stents in 2018 reports chest pain in past 2 days, now with abd pain and shortness of breath.

## 2024-01-12 LAB
ATRIAL RATE - MUSE: 96 BPM
DIASTOLIC BLOOD PRESSURE - MUSE: NORMAL MMHG
INTERPRETATION ECG - MUSE: NORMAL
P AXIS - MUSE: 46 DEGREES
PR INTERVAL - MUSE: 144 MS
QRS DURATION - MUSE: 90 MS
QT - MUSE: 364 MS
QTC - MUSE: 459 MS
R AXIS - MUSE: -36 DEGREES
SYSTOLIC BLOOD PRESSURE - MUSE: NORMAL MMHG
T AXIS - MUSE: 50 DEGREES
VENTRICULAR RATE- MUSE: 96 BPM

## 2024-01-16 ENCOUNTER — OFFICE VISIT (OUTPATIENT)
Dept: NEUROLOGY | Facility: CLINIC | Age: 54
End: 2024-01-16
Attending: PSYCHIATRY & NEUROLOGY
Payer: COMMERCIAL

## 2024-01-16 VITALS
SYSTOLIC BLOOD PRESSURE: 134 MMHG | WEIGHT: 123 LBS | DIASTOLIC BLOOD PRESSURE: 88 MMHG | RESPIRATION RATE: 16 BRPM | HEART RATE: 96 BPM | BODY MASS INDEX: 21.79 KG/M2

## 2024-01-16 DIAGNOSIS — R29.898 WEAKNESS OF BOTH LOWER EXTREMITIES: ICD-10-CM

## 2024-01-16 DIAGNOSIS — G89.29 CHRONIC BILATERAL LOW BACK PAIN WITH BILATERAL SCIATICA: ICD-10-CM

## 2024-01-16 DIAGNOSIS — R25.2 LEG CRAMPS: ICD-10-CM

## 2024-01-16 DIAGNOSIS — M54.41 CHRONIC BILATERAL LOW BACK PAIN WITH BILATERAL SCIATICA: ICD-10-CM

## 2024-01-16 DIAGNOSIS — I63.50 CEREBROVASCULAR ACCIDENT OF RIGHT PONTINE STRUCTURE (H): ICD-10-CM

## 2024-01-16 DIAGNOSIS — G43.719 INTRACTABLE CHRONIC MIGRAINE WITHOUT AURA AND WITHOUT STATUS MIGRAINOSUS: ICD-10-CM

## 2024-01-16 DIAGNOSIS — M54.42 CHRONIC BILATERAL LOW BACK PAIN WITH BILATERAL SCIATICA: ICD-10-CM

## 2024-01-16 DIAGNOSIS — Z76.0 ENCOUNTER FOR MEDICATION REFILL: ICD-10-CM

## 2024-01-16 DIAGNOSIS — R25.2 LEG CRAMPS: Primary | ICD-10-CM

## 2024-01-16 DIAGNOSIS — R79.0 LOW FERRITIN: ICD-10-CM

## 2024-01-16 PROCEDURE — 95910 NRV CNDJ TEST 7-8 STUDIES: CPT | Performed by: PSYCHIATRY & NEUROLOGY

## 2024-01-16 PROCEDURE — 95886 MUSC TEST DONE W/N TEST COMP: CPT | Mod: RT | Performed by: PSYCHIATRY & NEUROLOGY

## 2024-01-16 PROCEDURE — 99215 OFFICE O/P EST HI 40 MIN: CPT | Mod: 25 | Performed by: PSYCHIATRY & NEUROLOGY

## 2024-01-16 RX ORDER — FERROUS SULFATE 325(65) MG
325 TABLET ORAL
Qty: 90 TABLET | Refills: 1 | Status: SHIPPED | OUTPATIENT
Start: 2024-01-16 | End: 2024-03-18

## 2024-01-16 RX ORDER — TIZANIDINE 2 MG/1
2 TABLET ORAL 3 TIMES DAILY PRN
Qty: 270 TABLET | Refills: 0 | Status: SHIPPED | OUTPATIENT
Start: 2024-01-16 | End: 2024-03-18

## 2024-01-16 RX ORDER — GABAPENTIN 600 MG/1
TABLET ORAL
Qty: 90 TABLET | Refills: 3 | Status: SHIPPED | OUTPATIENT
Start: 2024-01-16 | End: 2024-05-07

## 2024-01-16 NOTE — LETTER
1/16/2024         RE: Kulwant Amin  [address] [address]        Dear Colleague,    Thank you for referring your patient, Kulwant Amin, to the [facility] [place]. Please see a copy of my visit note below.    See procedure note.       Again, thank you for allowing me to participate in the care of your patient.        Sincerely,        Guerrero Prescott MD

## 2024-01-16 NOTE — TELEPHONE ENCOUNTER
Pharmacy sent refill request for gabapentin   --Med last Rx 1/2/23 #540, 3 refills by Daily and then 1/3/23 #540, 3 refill by Dr. Cardoza as well  --Last OV 11/25/2020 with Daily   --Future appt: none    It's been over 1 year. Patient will need to schedule a follow up appt prior to further refill request per clinic protocol.

## 2024-01-16 NOTE — TELEPHONE ENCOUNTER
Attempt to call pt with a Arabic . No message left behind. I will try calling tomorrow. Per Daily, she did refill gabapentin this time with refill but would like patient to follow up in clinic prior to further refills.

## 2024-01-16 NOTE — PROCEDURES
[facility]    Formerly [facility]  [address], [address]  [place] [address]  Tel:  [phone]   Fax: [phone]    Patient: Kulwant PAEZ Gender: Female   MRN: [id number] :        Visit Date: 2024 12:58:58 PM Interpreted by: Guerrero Prescott MD   Age: [age] years Ref Provider: Adrien Cardoza MD     Reason for visit:  Evaluate bilateral lowers. c/o tingling, weakness in both legs > 2 years. Diabetic > 5 years.   NCS+  Motor Sites      Latency Amplitude Distance Velocity   Site (ms) Norm (mV) Norm (cm) (m/s) Norm   Left Fibular (EDB)   Ankle 4.3  < 6.6 4.1  > 2.0 8     Bel Fib Head 9.5 - 3.4 - 28 54  > 40   Pop Fossa 11.7 - 3.2 - 11 50  > 40   Right Fibular (EDB)   Ankle 4.8  < 6.6 2.7  > 2.0 8     Bel Fib Head 10.6 - 2.6 - 28 48  > 40   Pop Fossa 13.1 - 2.5 - 11 44  > 40   Left Tibial (AHB)   Ankle 4.6  < 6.5 10.9  > 4.4 8     Knee 11.6 - 10.9 - 34 49  > 40   Right Tibial (AHB)   Ankle 5.3  < 6.5 15.6  > 4.4 8     Knee 12.9 - 15.7 - 35 46  > 40     NCS+  Sensory Sites      Onset Lat Peak Lat Amp (O-P) Distance Velocity   Site ms (ms)  V Norm cm m/s Norm   Left Sural (Rec:Lat Mall)   Calf 2.2 2.8 38  > 5 14 64  > 40   Right Sural (Rec:Lat Mall)   Calf 2.5 3.2 20  > 5 14 56  > 40   Left Superficial Fibular (Rec:Ankle)   12 cm 2.4 3.1 12  > 3 12 50 -   Right Superficial Fibular (Rec:Ankle)   12 cm 2.2 3.3 10  > 3 12 55 -     F Wave Studies     NR F-Lat (ms) Lat Norm (ms) L-R F-Lat (ms) L-R Lat Norm   Left Tibial (Abd Hallucis)      47.81 <50 0.63 <5.7   Right Tibial (Abd Hallucis)      47.19 <50 0.63 <5.7     Electromyography     Side Muscle Fib PSW Fasc IA # Amp Dur PPP RcmtRate Note Comment   Right Gluteus Med None None None N N N N N N N    Right Gluteus Max None None None N N N N N N N    Right L3 Parasp None None None N N N N N N N    Right L4 Parasp None None None N N N N N N N    Right L5 Parasp None None None N N N N N N N    Right S1 Parasp  None None None N N N N N N N    Right Add Derrick None None None N N N N N N N    Right Quad Femoris None None None N N N N N N N    Right Tib Anterior 1+ 1+ None N SIRed Incr Incr N N N    Right Gastroc MH 1+ 1+ None N SIRed Incr Incr N N N    Right Tib Posterior 1+ 1+ None N SIRed Incr Incr N N N    Left Add Derrick None None None N N N N N N N    Left Quad Femoris None None None N N N N N N N    Left Tib Anterior 1+ 1+ None N SIRed Incr Incr N N N    Left Gastroc MH 1+ 1+ None N SIRed Incr Incr N N N    Left Tib Posterior 1+ 1+ None N SIRed Incr Incr N N N    Left Fib Longus 1+ 1+ None N SIRed Incr Incr N N N    Right Fib Longus 1+ 1+ None N SIRed Incr Incr N N N        SUMMARY  Nerve conduction and EMG study of bilateral lower extremities shows:    Normal right peroneal distal motor latency, amplitude and conduction velocity.  Normal left peroneal distal motor latency, amplitude and conduction velocity.  Normal right tibial distal motor latency, amplitude and conduction velocity.  Normal left tibial distal motor latency, amplitude and conduction velocity.  Normal bilateral sural and superficial peroneal sensory SNAP.  Monopolar needle exam as above.      CLINICAL INTERPRETATION:  This is an abnormal nerve conduction and EMG study.  The needle study shows increased spontaneous activity in bilateral L4/L5/S1 muscle groups.  This can be seen in patients with lumbar radiculopathies.  Further clinical correlation is needed.     Guerrero Prescott MD  Neurologist  [facility]  Tel:- [phone]

## 2024-01-16 NOTE — LETTER
1/16/2024         RE: Kulwant Amin  [address] [facility] [address]        Dear Colleague,    Thank you for referring your patient, Kulwant Amin, to the [facility] [place]. Please see a copy of my visit note below.    NEUROLOGY OUTPATIENT PROGRESS NOTE  Jan 16, 2024     CHIEF COMPLAINT/REASON FOR VISIT/REASON FOR CONSULT  Patient presents with:  Follow Up    REASON FOR CONSULTATION- Multiple issues    REFERRAL SOURCE   Referred Self  CC  Referred Self    HISTORY OF PRESENT ILLNESS  Kulwant Amin is a 54 year old female seen today for evaluation of multiple issues.    In 2021 she was found to have a right pontine stroke.  Presenting symptoms of vertigo.  No clear etiology for the stroke was identified it was thought to be lacunar.  She does have a history of hypertension, hyperlipidemia, diabetes.  She was supposed to be on Plavix though currently is only on aspirin.  No recurrence of stroke symptoms.  She is presented to the ER since then with vertigo symptoms and her imaging studies have been negative    1.  She reports that the vertigo is there all the time.  She is using meclizine which she finds some benefit with. She has done vestibular rehab without any improvement.    2.  Further complains of headaches.  These are around-the-clock.  He is taking Tylenol every 8 hours to get rid of the headaches.  Headaches are more frontal.  They can be throbbing in nature with photophobia and phonophobia.  She does continue visual auras as well.  She is on amitriptyline at nighttime.  She feels that it might be helping with the headaches.  Does not get good sleep due to COPD.  Headaches started after her stroke.    3.  No other new strokelike symptoms.    4.  Does complain of some lightheadedness especially during the exam today.  Has been put on hydrochlorothiazide by her primary care provider.    5/24/23  Patient returns today  1.  No stroke symptoms.  Is tolerating her stroke  medications  2.  Continues to have continuous headache 24/7.  Occasionally it will go away but then come back.  Is still overusing Tylenol.  Generally uses 1 tablet a day but can use up to 3 tablets a day.  Amitriptyline did help with the headaches and now they are not getting as severe as compared to before.  3.  Still feels dizzy.  Drinking plenty of water.    8/10/23  Patient returns today  1.  No further strokelike symptoms.  Continues to complain of some dizziness.  2.  Headaches have improved with use of the amitriptyline.  She continues to have a very mild continuous headache all the time.  She is only using the Tylenol 1-2 times a week.  The more severe headaches are only 1-2 times a week as well.  Amitriptyline does help without side effects though she would like to try other medications  3.  Drinks plenty of water.  No history of kidney stones.    10/26/23  Patient returns to the  1.  No further strokelike symptoms.  2.  Continues to have headaches every day.  Some associated dizziness.  She did try the Topamax and that was thought to be causing muscle cramps and as a result she stopped it.  No benefit with the headaches.  3.  Muscle cramps in the legs are still present.  When asked where she has pain in her legs she describes all over with stretching her knee and she might have knee pain.  She also reports bilateral leg weakness.  Does have chronic back pain that has not really worsened.  Some neck stiffness as well.  No other new neurological symptoms.    1/16/24  Patient returns today.  1.  No further strokelike symptoms.  Has had been having a lot of left leg weakness.  Was seen in the ER and put on some steroids.  This is not really helped.  There is pain and spasms in the left leg along with weakness.  Continues to have back pain.  Has not been able to do the MRIs  2.  Headaches are better and she is having 5 headaches a month.  Emgality does help with the headaches.  Tylenol does help with the  headaches and also prevents headaches for few days.  3.  No further symptoms.    Previous history is reviewed and this is unchanged.    PAST MEDICAL/SURGICAL HISTORY  Past Medical History:   Diagnosis Date     Acute asthma exacerbation 2020     Anxiety      Arthritis      Asthma exacerbation 2015     Chronic obstructive pulmonary disease, unspecified COPD type (H)      COPD (chronic obstructive pulmonary disease) (H)      Coronary artery disease due to lipid rich plaque      Depression      Epigastric pain 12/15/2021     Essential hypertension      GERD (gastroesophageal reflux disease)      Infection due to  novel coronavirus 2022    positive with COVID-19 on January 3, 2022     Latent tuberculosis 2019     Microcytic anemia 2019     S/P coronary artery stent placement 2018     TB lung, latent     9 mos INH     Patient Active Problem List   Diagnosis     Pulmonary nodule, right     Environmental allergies     TB lung, latent     COPD (chronic obstructive pulmonary disease)/Asthma      Essential hypertension     Cataracts, bilateral     Corneal opacity     Irregular astigmatism     Anxiety     Chronic nonintractable headache, unspecified headache type     Coronary artery disease due to lipid rich plaque     Dizziness     Hyperlipidemia     Moderate major depression (H)     Moderate persistent asthma     Unspecified visual loss     GERD (gastroesophageal reflux disease)     Dental caries     H/O arterial ischemic stroke     Constipation     Dysphagia     Chronic abdominal pain     Insomnia     FLACO (obstructive sleep apnea)- mild (AHI 14)     Chest pain     Chest pain, unspecified type     Type 2 diabetes mellitus treated with insulin (H)   Significant for arthritis, diabetes, high blood pressure, hyperlipidemia, stroke    FAMILY HISTORY  Family History   Problem Relation Age of Onset     Other - See Comments Mother          of an intestinal problem     Ulcers Father           of gastritis     Breast Cancer No family hx of    Negative for stroke    SOCIAL HISTORY  Social History     Tobacco Use     Smoking status: Former     Packs/day: 1.00     Years: 30.00     Additional pack years: 0.00     Total pack years: 30.00     Types: Cigarettes     Quit date: 2003     Years since quittin.0     Passive exposure: Never     Smokeless tobacco: Never     Tobacco comments:     No passive exposure   Vaping Use     Vaping Use: Never used   Substance Use Topics     Alcohol use: No     Drug use: No       SYSTEMS REVIEW  Twelve-system ROS was done and other than the HPI this was negative except for back pain, joint pain, numbness and tingling, weakness/paralysis, difficulty walking, balance/coordination problems, dizziness, headaches, anxiety, cardiac/heart problems, respiratory problems, snoring loudly.  No new concerns/issues.    MEDICATIONS  acetaminophen (TYLENOL) 500 MG tablet, TAKE 1 PILL BY MOUTH EVERY 6 HOURS AS NEEDED FOR PAIN  albuterol (PROAIR HFA/PROVENTIL HFA/VENTOLIN HFA) 108 (90 Base) MCG/ACT inhaler, Inhale 2 puffs into the lungs every 4 hours as needed for shortness of breath  albuterol (PROVENTIL) (2.5 MG/3ML) 0.083% neb solution, Take 1 vial (2.5 mg) by nebulization every 6 hours as needed for shortness of breath, wheezing or cough  amitriptyline (ELAVIL) 50 MG tablet, Take 1 tablet (50 mg) by mouth At Bedtime  ASPIRIN LOW DOSE 81 MG EC tablet, TAKE 1 TABLET (81 MG TOTAL) BY MOUTH DAILY.  atorvastatin (LIPITOR) 80 MG tablet, Take 1 tablet (80 mg) by mouth daily  B-D U/F 31G X 8 MM insulin pen needle, USE ONE PEN NEEDLE DAILY AS NEEDED FOR SSI  benzonatate (TESSALON) 100 MG capsule, Take 1 capsule (100 mg) by mouth 2 times daily as needed for cough  calcium carbonate (TUMS) 500 MG chewable tablet, Take 1 tablet (500 mg) by mouth 2 times daily as needed for heartburn  colchicine (COLCRYS) 0.6 MG tablet, Take 1 tablet (0.6 mg) by mouth daily  Continuous Blood Gluc Sensor  (FREESTYLE MONICA 2 SENSOR) [facility], 1 EACH EVERY 14 DAYS USE 1 SENSOR EVERY 14 DAYS. USE TO READ BLOOD SUGARS PER 'S INSTRUCTIONS.  CONTOUR NEXT TEST test strip, USE 1 EACH AS DIRECTED 3 (THREE) TIMES A DAY.  cyclobenzaprine (FLEXERIL) 5 MG tablet, Take 5 mg by mouth 3 times daily as needed for muscle spasms  dextromethorphan (DELSYM) 30 MG/5ML liquid, Take 10 mLs (60 mg) by mouth 2 times daily as needed for cough  diclofenac (VOLTAREN) 1 % topical gel, Apply 4 g topically 4 times daily  docusate sodium (COLACE) 100 MG capsule, Take 2 capsules (200 mg) by mouth 2 times daily as needed for constipation  dupilumab (DUPIXENT) 300 MG/2ML prefilled pen, Inject 2 mLs (300 mg) Subcutaneous every 14 days (Patient taking differently: Inject 300 mg Subcutaneous every 14 days Using every 30 days)  FLUoxetine (PROZAC) 10 MG capsule, TAKE 1 CAPSULE (10 MG) BY MOUTH DAILY  fluticasone-vilanterol (BREO ELLIPTA) 200-25 MCG/ACT inhaler, Inhale 1 puff into the lungs daily  gabapentin (NEURONTIN) 300 MG capsule, Take 2 capsules (600 mg) by mouth 3 times daily  galcanezumab-gnlm (EMGALITY) 120 MG/ML injection, Inject 2 mLs (240 mg) Subcutaneous every 28 days Loading dose.  galcanezumab-gnlm (EMGALITY) 120 MG/ML injection, Inject 1 mL (120 mg) Subcutaneous every 28 days  hydrochlorothiazide (MICROZIDE) 12.5 MG capsule, Take 1 capsule (12.5 mg) by mouth every morning  hydrocortisone (CORTAID) 1 % external cream, Apply topically 2 times daily  ibuprofen (ADVIL/MOTRIN) 600 MG tablet, Take 600 mg by mouth every 6 hours as needed for moderate pain  insulin aspart (NOVOLOG PEN) 100 UNIT/ML pen, Inject 16 Units Subcutaneous 2 times daily (with meals)  insulin glargine (LANTUS PEN) 100 UNIT/ML pen, Inject 44 Units Subcutaneous every morning  insulin pen needle (32G X 4 MM) 32G X 4 MM miscellaneous, Use one pen needles daily or as directed.  loratadine (CLARITIN) 10 MG tablet, Take 10 mg by mouth daily  meclizine (ANTIVERT) 12.5 MG  tablet, TAKE 2 TABLETS (25 MG) BY MOUTH 3 TIMES DAILY AS NEEDED FOR DIZZINESS  metFORMIN (GLUCOPHAGE XR) 500 MG 24 hr tablet, Take 1 tablet (500 mg) by mouth 2 times daily (with meals)  metoprolol tartrate (LOPRESSOR) 25 MG tablet, TAKE 1 TABLET (25 MG TOTAL) BY MOUTH 2 (TWO) TIMES A DAY FOR BLOOD PRESSURE  montelukast (SINGULAIR) 10 MG tablet, Take 1 tablet (10 mg) by mouth At Bedtime  omeprazole (PRILOSEC) 40 MG DR capsule, Take 1 capsule (40 mg) by mouth daily  Polyethylene Glycol 3350 (PEG 3350) 17 GM/SCOOP POWD, MIX 17 GRAMS WITH LIQUID AND DRINK ONCE DAILY FOR CONSTIPATION  sucralfate (CARAFATE) 1 GM/10ML suspension, Take 10 mLs (1 g) by mouth 4 times daily  tiotropium (SPIRIVA RESPIMAT) 2.5 MCG/ACT inhaler, Inhale 2 puffs into the lungs daily    tezepelumab-ekko (TEZSPIRE) injection 210 mg         PHYSICAL EXAMINATION  VITALS: /88   Pulse 96   Resp 16   Wt 55.8 kg (123 lb)   BMI 21.79 kg/m    GENERAL: Healthy appearing, alert, no acute distress, normal habitus.  CARDIOVASCULAR: Extremities warm and well perfused. Pulses present.   NEUROLOGICAL:  Patient is awake and oriented to self, place and time.  Attention span is normal.  Memory is grossly intact.  Language is fluent and follows commands appropriately.  Appropriate fund of knowledge. Cranial nerves 2-12 are intact. There is no pronator drift.  Motor exam shows  4/5 strength in all extremities.  Generalized weakness due to pain all over.  Tone is symmetric bilaterally in upper and lower extremities.  Previously reflexes are symmetric and 1+ in upper extremities and lower extremities. Sensory exam is grossly intact to light touch, pin prick and vibration.  Finger to nose and heel to shin is without dysmetria.  Romberg is negative.  Gait is normal and the patient is able to do tandem walk and walk on toes and heels with mild difficulty.  Orthostatic vitals    Exam shows some worsening left hip flexion weakness related to pain.  Possibly this is  more knee pain than neurological pain.      DIAGNOSTICS  MRI 2022  IMPRESSION:  1.  No acute intracranial abnormalities identified.  2.  Minor chronic small vessel ischemic change, otherwise unremarkable contrast-enhanced MRI of the brain.    MRA                                                               IMPRESSION:  1.  Normal MRA [place].      MRA neck  IMPRESSION:  1.  Normal neck MRA.    Cardiac event monitor    ECHo  Left ventricular size, wall motion and function are normal. The ejection  fraction is 55-60%.  There is borderline anterior, septal, and apical wall hypokinesis.  Normal right ventricle size and systolic function.  No hemodynamically significant valvular abnormalities on 2D or color flow  Imaging.      CTA 2021  HEAD CT:  1.  No evidence of acute hemorrhage, mass effect, or acute vascular territorial infarction.  2.  Severe left sphenoid sinus disease.     HEAD CTA:   1.  No evidence of proximal large vessel occlusion or flow-limiting stenosis.  2.  Dorsally directed 2 mm contour irregularity arising from the distal left supraclinoid ICA may represent a tiny posterior communicating artery aneurysm.     NECK CTA:  1.  No hemodynamically significant stenosis based on NASCET criteria.  2.  Mild left V1 segment stenosis.  3.  No evidence for dissection.      MRI 2020  IMPRESSION:  MRI BRAIN:  1. No finding for acute infarct, hemorrhage, or mass.  2. There are a few very small foci of FLAIR hyperintensity within the cerebral white matter, nonspecific but compatible with small areas of gliosis and/or chronic myelin loss.     MRA [place]:  1. Congenital variation of the [place] without vascular cutoff, aneurysm, or flow-limiting stenosis.      MRI 2021  HEAD MRI:   1.  Tiny linear focus of diffusion restriction within the right dorsal diomedes suspicious for acute small vessel infarct. This is new compared to 12/07/2020.  2.  No hemorrhagic transformation or mass effect. No  additional infarct identified.  3.  Mild presumed microvascular ischemic change within the cerebral white matter.     HEAD MRA:   1.  No aneurysm, high flow AVM or significant stenosis identified.  2.  Variant [place] anatomy as above.     NECK MRA:  1.  Evaluation degraded by suboptimal timing of the contrast bolus and significant venous contamination the gadolinium bolus MRA.  2.  No hemodynamically significant stenosis in the carotid circulation by NASCET criteria.  3.  Poor visualization of the left vertebral artery origin and proximal left V1 segment. This may be artifactual, but it is difficult to exclude high-grade stenosis in this region.      MRI 2021  IMPRESSION:  1.  No mass, hemorrhage, or recent infarct identified.  2.  Diffusion abnormality involving the right ventral diomedes seen on 01/13/2021 is no longer identified. No definite residual signal abnormality in this location to confirm prior infarct. This may relate to small size and slice selection.  3.  Inflammatory changes of the paranasal sinuses including bilateral maxillary air-fluid levels. Correlate with clinical evidence for sinusitis.      RELEVANT LABS  Component      Latest Ref Rng & Units 11/30/2022 2/5/2023   WBC      4.0 - 11.0 10e3/uL  9.3   RBC Count      3.80 - 5.20 10e6/uL  4.59   Hemoglobin      11.7 - 15.7 g/dL  11.8   Hematocrit      35.0 - 47.0 %  38.1   MCV      78 - 100 fL  83   MCH      26.5 - 33.0 pg  25.7 (L)   MCHC      31.5 - 36.5 g/dL  31.0 (L)   RDW      10.0 - 15.0 %  15.1 (H)   Platelet Count      150 - 450 10e3/uL  228   % Neutrophils      %  69   % Lymphocytes      %  14   % Monocytes      %  6   % Eosinophils      %  11   % Basophils      %  0   % Immature Granulocytes      %  0   NRBCs per 100 WBC      <1 /100  0   Absolute Neutrophils      1.6 - 8.3 10e3/uL  6.4   Absolute Lymphocytes      0.8 - 5.3 10e3/uL  1.3   Absolute Monocytes      0.0 - 1.3 10e3/uL  0.6   Absolute Eosinophils      0.0 - 0.7 10e3/uL   1.0 (H)   Absolute Basophils      0.0 - 0.2 10e3/uL  0.0   Absolute Immature Granulocytes      <=0.4 10e3/uL  0.0   Absolute NRBCs      10e3/uL  0.0   Sodium      136 - 145 mmol/L  138   Potassium      3.4 - 5.3 mmol/L  4.2   Chloride      98 - 107 mmol/L  102   Carbon Dioxide (CO2)      22 - 29 mmol/L  25   Anion Gap      7 - 15 mmol/L  11   Urea Nitrogen      6.0 - 20.0 mg/dL  10.6   Creatinine      0.51 - 0.95 mg/dL  0.58   Calcium      8.6 - 10.0 mg/dL  9.5   Glucose      70 - 99 mg/dL  156 (H)   GFR Estimate      >60 mL/min/1.73m2  >90   Hemoglobin A1C      0.0 - 5.6 % 8.6 (H)      OUTSIDE RECORDS  Outside referral notes and chart notes were reviewed and pertinent information has been summarized (in addition to the HPI):- Dr. Multani notes reviewed      ER notes above reviewed    Component      Latest Ref Rng 9/14/2023  9:21 AM 10/1/2023  9:42 AM   Sodium      135 - 145 mmol/L  136    Potassium      3.4 - 5.3 mmol/L  4.3    Chloride      98 - 107 mmol/L  102    Carbon Dioxide (CO2)      22 - 29 mmol/L  22    Anion Gap      7 - 15 mmol/L  12    Urea Nitrogen      6.0 - 20.0 mg/dL  9.2    Creatinine      0.51 - 0.95 mg/dL  0.53    GFR Estimate      >60 mL/min/1.73m2  >90    Calcium      8.6 - 10.0 mg/dL  9.1    Glucose      70 - 99 mg/dL  201 (H)    WBC      4.0 - 11.0 10e3/uL  7.2    RBC Count      3.80 - 5.20 10e6/uL  4.73    Hemoglobin      11.7 - 15.7 g/dL  11.6 (L)    Hematocrit      35.0 - 47.0 %  38.4    MCV      78 - 100 fL  81    MCH      26.5 - 33.0 pg  24.5 (L)    MCHC      31.5 - 36.5 g/dL  30.2 (L)    RDW      10.0 - 15.0 %  15.7 (H)    Platelet Count      150 - 450 10e3/uL  230    CRP Inflammation      <5.00 mg/L 3.20        Legend:  (H) High  (L) Low    LABS  Component      Latest Ref Rng 11/7/2023  10:46 AM   Sodium      135 - 145 mmol/L 138    Potassium      3.4 - 5.3 mmol/L 4.0    Carbon Dioxide (CO2)      22 - 29 mmol/L 25    Anion Gap      7 - 15 mmol/L 13    Urea Nitrogen      6.0 - 20.0  mg/dL 13.3    Creatinine      0.51 - 0.95 mg/dL 0.59    GFR Estimate      >60 mL/min/1.73m2 >90    Calcium      8.6 - 10.0 mg/dL 9.3    Chloride      98 - 107 mmol/L 100    Glucose      70 - 99 mg/dL 184 (H)    Alkaline Phosphatase      35 - 104 U/L 116 (H)    AST      0 - 45 U/L 48 (H)    ALT      0 - 50 U/L 41    Protein Total      6.4 - 8.3 g/dL 7.1    Albumin      3.5 - 5.2 g/dL 4.1    Bilirubin Total      <=1.2 mg/dL 0.3    KATY-1 (Histidyl-tRNA Synthetase) Bere IgG      0 - 40 AU/mL 1    PL-12 (alanyl-tRNA synthetase) Antibody      Negative  Negative    PL-7 (threonyl-tRNA synthetase) Antibody      Negative  Negative    EJ (glycyl - tRNA synthetase) Antibody      Negative  Negative    OJ (isoleucyl-tRNA synthetase) Antibody      Negative  Negative    SRP (Signal Recognition Particle) Antibody      Negative  Negative    Mi-2 (nuclear helicase protein) Antibody      Negative  Negative    P155/140 (TIF1-gamma) Antibody      Negative  Negative    TIF-1 Gamma Antibody      Negative  Negative    SAE1 (SUMO activating enzyme) Bere      Negative  Negative    MDA5 (CADM 140) Bere      Negative  Negative    NXP-2 (Nuclear matrix protein 2) Bere      Negative  Negative    Myositis Interp See Note    Albumin Fraction      3.7 - 5.1 g/dL 3.8    Alpha 1 Fraction      0.2 - 0.4 g/dL 0.3    Alpha 2 Fraction      0.5 - 0.9 g/dL 0.7    Beta Fraction      0.6 - 1.0 g/dL 0.9    Gamma Fraction      0.7 - 1.6 g/dL 1.0    Monoclonal Peak      <=0.0 g/dL 0.0    ELP Interpretation: Essentially normal electrophoretic pattern. No obvious monoclonal proteins seen. Pathologic significance requires clinical correlation. Marlene Zamudio M.D., Ph.D.    Vitamin B12      232 - 1,245 pg/mL 602    Vitamin B1 Whole Blood Level      70 - 180 nmol/L 111    TSH      0.30 - 4.20 uIU/mL 0.35    Hemoglobin A1C      0.0 - 5.6 % 11.1 (H)    Magnesium      1.7 - 2.3 mg/dL 1.7    CK Total      26 - 192 U/L 59    Ferritin      11 - 328 ng/mL 26    Immunofixation ELP  No monoclonal protein seen on immunofixation. Pathologic significance requires clinical correlation. Marlene Zamudio M.D., Ph.D.    Total Protein Serum for ELP      6.4 - 8.3 g/dL 6.7       Legend:  (H) High  ER notes reviewed.    CLINICAL INTERPRETATION:  This is an abnormal nerve conduction and EMG study.  The needle study shows increased spontaneous activity in bilateral L4/L5/S1 muscle groups.  This can be seen in patients with lumbar radiculopathies.  Further clinical correlation is needed.     MRI L spine 2021  IMPRESSION:   1.  Small left paracentral L5-S1 disc protrusion. Mild left L5-S1 foraminal stenosis.     2.  Minor degenerative disc changes at L4-L5.       IMPRESSION/REPORT/PLAN  Cerebrovascular accident of right pontine structure (H)  Medication overuse headache  Vertigo  Light headed  Intractable chronic migraine without aura and without status migrainosus  Bilateral leg weakness/muscle cramps versus knee pain  Left leg weakness  Low ferritin    This is a 54 year old female history of right pontine stroke thought to be due to vascular risk factors.  Currently she is on aspirin for stroke prevention.  Further management of vascular risk factors per primary care.  No new stroke symptoms.    She does complain of vertigo and most likely this is related to her stroke.  Meclizine is currently helping and should continue.  She is tried vestibular rehab without any benefit.  Could be related to headaches also.  There might not be any further treatment options available for this.  No change.    In terms of her headaches these are most suggestive of medication overuse headaches since she is overusing Tylenol.  Encourage her not to use the Tylenol more than 2-3 times per week; reemphasized this today.  With cutting down on the Tylenol there has been some improvement.  Amitriptyline at higher doses is also helping.  Topamax was not effective/caused side effects.  She is continues to have headaches every day.  We  will try Emgality. Tylenol is sufficient for abortive therapy.  Her severe headaches do have migraine features and could be migraine headaches.  Amitriptyline was initially prescribed through primary care.  Headaches seem to be under good control with the Emgality and discussed what to expect and that the headaches will never completely go away completely.  Continue Tylenol for abortive therapy.    She does complain of some lightheadedness and orthostatic vitals were negative.  Most likely this is unrelated to the stroke.  Further management per primary care.  Encourage good hydration.  No change.  She is drinking plenty of water.    She does complain of new bilateral leg weakness/muscle cramps.  On exam she has generalized weakness related to her muscle pain.  Increased left leg weakness on exam today though this might be more knee pain than muscle pain.  EMG suggested a lumbar radiculopathy and will await MRI L-spine to further evaluate.  MRI C-spine is also pending.  Blood work did show low ferritin and we will put her on iron supplement to see if that helps with any of the muscle spasms at nighttime.  Also started on tizanidine for muscle spasms.     For the left knee pain previously she was referred to orthopedics.     We will check blood work/EMG to look for causes of myopathy.  We will also check MRI L-spine/C-spine to further evaluate her leg weakness.  He also points to her knee and she might have more knee pain.  Will refer to orthopedics.    I can see her back in 1 months after testing.    -     topiramate (TOPAMAX) 25 MG tablet; Take 1 tablet at night. Can increase to 2 tabs/night if needed. -_STOP  -     amitriptyline (ELAVIL) 50 MG tablet; Take 1 tablet (50 mg) by mouth At Bedtime  -     galcanezumab-gnlm (EMGALITY) 120 MG/ML injection; Inject 2 mLs (240 mg) Subcutaneous every 28 days Loading dose.  -     galcanezumab-gnlm (EMGALITY) 120 MG/ML injection; Inject 1 mL (120 mg) Subcutaneous every 28  days    -     MR Lumbar Spine w/o Contrast; Future  -     MR Cervical Spine w/o Contrast; Future  -     Orthopedic  Referral; Future    -     tiZANidine (ZANAFLEX) 2 MG tablet; Take 1 tablet (2 mg) by mouth 3 times daily as needed for muscle spasms  -     ferrous sulfate (FEROSUL) 325 (65 Fe) MG tablet; Take 1 tablet (325 mg) by mouth daily (with breakfast)    Return in about 1 month (around 2/16/2024) for In-Clinic Visit (must), Add on PAOR, After testing.    Over 40 minutes were spent coordinating the care for the patient on the day of the encounter.  This includes previsit, during visit and post visit activities as documented above.  Counseling patient.  Prescription management.  Multiple test reviewed.  Use of  requires extra time.  Multiple problems reviewed.  (Activities include but not inclusive of reviewing chart, reviewing outside records, reviewing labs and imaging study results as well as the images, patient visit time including getting history and exam,  use if applicable, review of test results with the patient and coming up with a plan in a shared model, counseling patient and family, education and answering patient questions, EMR , EMR diagnosis entry and problem list management, medication reconciliation and prescription management if applicable, paperwork if applicable, printing documents and documentation of the visit activities.)        Guerrero Prescott MD  Neurologist  [facility]  Tel:- [phone]    This note was dictated using voice recognition software.  Any grammatical or context distortions are unintentional and inherent to the software.      Again, thank you for allowing me to participate in the care of your patient.        Sincerely,        Guerrero Prescott MD

## 2024-01-16 NOTE — TELEPHONE ENCOUNTER
Attempt to use language line to call patient but I was not able to get through. I was transferred to a [facility]  but keep losing connection. Attempt 3 times and a row.

## 2024-01-16 NOTE — PROGRESS NOTES
NEUROLOGY OUTPATIENT PROGRESS NOTE  Jan 16, 2024     CHIEF COMPLAINT/REASON FOR VISIT/REASON FOR CONSULT  Patient presents with:  Follow Up    REASON FOR CONSULTATION- Multiple issues    REFERRAL SOURCE   Referred Self  CC  Referred Self    HISTORY OF PRESENT ILLNESS  Kulwant Amin is a 54 year old female seen today for evaluation of multiple issues.    In 2021 she was found to have a right pontine stroke.  Presenting symptoms of vertigo.  No clear etiology for the stroke was identified it was thought to be lacunar.  She does have a history of hypertension, hyperlipidemia, diabetes.  She was supposed to be on Plavix though currently is only on aspirin.  No recurrence of stroke symptoms.  She is presented to the ER since then with vertigo symptoms and her imaging studies have been negative    1.  She reports that the vertigo is there all the time.  She is using meclizine which she finds some benefit with. She has done vestibular rehab without any improvement.    2.  Further complains of headaches.  These are around-the-clock.  He is taking Tylenol every 8 hours to get rid of the headaches.  Headaches are more frontal.  They can be throbbing in nature with photophobia and phonophobia.  She does continue visual auras as well.  She is on amitriptyline at nighttime.  She feels that it might be helping with the headaches.  Does not get good sleep due to COPD.  Headaches started after her stroke.    3.  No other new strokelike symptoms.    4.  Does complain of some lightheadedness especially during the exam today.  Has been put on hydrochlorothiazide by her primary care provider.    5/24/23  Patient returns today  1.  No stroke symptoms.  Is tolerating her stroke medications  2.  Continues to have continuous headache 24/7.  Occasionally it will go away but then come back.  Is still overusing Tylenol.  Generally uses 1 tablet a day but can use up to 3 tablets a day.  Amitriptyline did help with the headaches and now  they are not getting as severe as compared to before.  3.  Still feels dizzy.  Drinking plenty of water.    8/10/23  Patient returns today  1.  No further strokelike symptoms.  Continues to complain of some dizziness.  2.  Headaches have improved with use of the amitriptyline.  She continues to have a very mild continuous headache all the time.  She is only using the Tylenol 1-2 times a week.  The more severe headaches are only 1-2 times a week as well.  Amitriptyline does help without side effects though she would like to try other medications  3.  Drinks plenty of water.  No history of kidney stones.    10/26/23  Patient returns to the  1.  No further strokelike symptoms.  2.  Continues to have headaches every day.  Some associated dizziness.  She did try the Topamax and that was thought to be causing muscle cramps and as a result she stopped it.  No benefit with the headaches.  3.  Muscle cramps in the legs are still present.  When asked where she has pain in her legs she describes all over with stretching her knee and she might have knee pain.  She also reports bilateral leg weakness.  Does have chronic back pain that has not really worsened.  Some neck stiffness as well.  No other new neurological symptoms.    1/16/24  Patient returns today.  1.  No further strokelike symptoms.  Has had been having a lot of left leg weakness.  Was seen in the ER and put on some steroids.  This is not really helped.  There is pain and spasms in the left leg along with weakness.  Continues to have back pain.  Has not been able to do the MRIs  2.  Headaches are better and she is having 5 headaches a month.  Emgality does help with the headaches.  Tylenol does help with the headaches and also prevents headaches for few days.  3.  No further symptoms.    Previous history is reviewed and this is unchanged.    PAST MEDICAL/SURGICAL HISTORY  Past Medical History:   Diagnosis Date    Acute asthma exacerbation 01/06/2020    Anxiety      Arthritis     Asthma exacerbation 2015    Chronic obstructive pulmonary disease, unspecified COPD type (H)     COPD (chronic obstructive pulmonary disease) (H)     Coronary artery disease due to lipid rich plaque     Depression     Epigastric pain 12/15/2021    Essential hypertension     GERD (gastroesophageal reflux disease)     Infection due to  novel coronavirus 2022    positive with COVID-19 on January 3, 2022    Latent tuberculosis 2019    Microcytic anemia 2019    S/P coronary artery stent placement 2018    TB lung, latent     9 mos INH     Patient Active Problem List   Diagnosis    Pulmonary nodule, right    Environmental allergies    TB lung, latent    COPD (chronic obstructive pulmonary disease)/Asthma     Essential hypertension    Cataracts, bilateral    Corneal opacity    Irregular astigmatism    Anxiety    Chronic nonintractable headache, unspecified headache type    Coronary artery disease due to lipid rich plaque    Dizziness    Hyperlipidemia    Moderate major depression (H)    Moderate persistent asthma    Unspecified visual loss    GERD (gastroesophageal reflux disease)    Dental caries    H/O arterial ischemic stroke    Constipation    Dysphagia    Chronic abdominal pain    Insomnia    FLACO (obstructive sleep apnea)- mild (AHI 14)    Chest pain    Chest pain, unspecified type    Type 2 diabetes mellitus treated with insulin (H)   Significant for arthritis, diabetes, high blood pressure, hyperlipidemia, stroke    FAMILY HISTORY  Family History   Problem Relation Age of Onset    Other - See Comments Mother          of an intestinal problem    Ulcers Father          of gastritis    Breast Cancer No family hx of    Negative for stroke    SOCIAL HISTORY  Social History     Tobacco Use    Smoking status: Former     Packs/day: 1.00     Years: 30.00     Additional pack years: 0.00     Total pack years: 30.00     Types: Cigarettes     Quit date: 2003     Years  since quittin.0     Passive exposure: Never    Smokeless tobacco: Never    Tobacco comments:     No passive exposure   Vaping Use    Vaping Use: Never used   Substance Use Topics    Alcohol use: No    Drug use: No       SYSTEMS REVIEW  Twelve-system ROS was done and other than the HPI this was negative except for back pain, joint pain, numbness and tingling, weakness/paralysis, difficulty walking, balance/coordination problems, dizziness, headaches, anxiety, cardiac/heart problems, respiratory problems, snoring loudly.  No new concerns/issues.    MEDICATIONS  acetaminophen (TYLENOL) 500 MG tablet, TAKE 1 PILL BY MOUTH EVERY 6 HOURS AS NEEDED FOR PAIN  albuterol (PROAIR HFA/PROVENTIL HFA/VENTOLIN HFA) 108 (90 Base) MCG/ACT inhaler, Inhale 2 puffs into the lungs every 4 hours as needed for shortness of breath  albuterol (PROVENTIL) (2.5 MG/3ML) 0.083% neb solution, Take 1 vial (2.5 mg) by nebulization every 6 hours as needed for shortness of breath, wheezing or cough  amitriptyline (ELAVIL) 50 MG tablet, Take 1 tablet (50 mg) by mouth At Bedtime  ASPIRIN LOW DOSE 81 MG EC tablet, TAKE 1 TABLET (81 MG TOTAL) BY MOUTH DAILY.  atorvastatin (LIPITOR) 80 MG tablet, Take 1 tablet (80 mg) by mouth daily  B-D U/F 31G X 8 MM insulin pen needle, USE ONE PEN NEEDLE DAILY AS NEEDED FOR SSI  benzonatate (TESSALON) 100 MG capsule, Take 1 capsule (100 mg) by mouth 2 times daily as needed for cough  calcium carbonate (TUMS) 500 MG chewable tablet, Take 1 tablet (500 mg) by mouth 2 times daily as needed for heartburn  colchicine (COLCRYS) 0.6 MG tablet, Take 1 tablet (0.6 mg) by mouth daily  Continuous Blood Gluc Sensor (FREESTYLE MONICA 2 SENSOR) MISC, 1 EACH EVERY 14 DAYS USE 1 SENSOR EVERY 14 DAYS. USE TO READ BLOOD SUGARS PER 'S INSTRUCTIONS.  CONTOUR NEXT TEST test strip, USE 1 EACH AS DIRECTED 3 (THREE) TIMES A DAY.  cyclobenzaprine (FLEXERIL) 5 MG tablet, Take 5 mg by mouth 3 times daily as needed for muscle  spasms  dextromethorphan (DELSYM) 30 MG/5ML liquid, Take 10 mLs (60 mg) by mouth 2 times daily as needed for cough  diclofenac (VOLTAREN) 1 % topical gel, Apply 4 g topically 4 times daily  docusate sodium (COLACE) 100 MG capsule, Take 2 capsules (200 mg) by mouth 2 times daily as needed for constipation  dupilumab (DUPIXENT) 300 MG/2ML prefilled pen, Inject 2 mLs (300 mg) Subcutaneous every 14 days (Patient taking differently: Inject 300 mg Subcutaneous every 14 days Using every 30 days)  FLUoxetine (PROZAC) 10 MG capsule, TAKE 1 CAPSULE (10 MG) BY MOUTH DAILY  fluticasone-vilanterol (BREO ELLIPTA) 200-25 MCG/ACT inhaler, Inhale 1 puff into the lungs daily  gabapentin (NEURONTIN) 300 MG capsule, Take 2 capsules (600 mg) by mouth 3 times daily  galcanezumab-gnlm (EMGALITY) 120 MG/ML injection, Inject 2 mLs (240 mg) Subcutaneous every 28 days Loading dose.  galcanezumab-gnlm (EMGALITY) 120 MG/ML injection, Inject 1 mL (120 mg) Subcutaneous every 28 days  hydrochlorothiazide (MICROZIDE) 12.5 MG capsule, Take 1 capsule (12.5 mg) by mouth every morning  hydrocortisone (CORTAID) 1 % external cream, Apply topically 2 times daily  ibuprofen (ADVIL/MOTRIN) 600 MG tablet, Take 600 mg by mouth every 6 hours as needed for moderate pain  insulin aspart (NOVOLOG PEN) 100 UNIT/ML pen, Inject 16 Units Subcutaneous 2 times daily (with meals)  insulin glargine (LANTUS PEN) 100 UNIT/ML pen, Inject 44 Units Subcutaneous every morning  insulin pen needle (32G X 4 MM) 32G X 4 MM miscellaneous, Use one pen needles daily or as directed.  loratadine (CLARITIN) 10 MG tablet, Take 10 mg by mouth daily  meclizine (ANTIVERT) 12.5 MG tablet, TAKE 2 TABLETS (25 MG) BY MOUTH 3 TIMES DAILY AS NEEDED FOR DIZZINESS  metFORMIN (GLUCOPHAGE XR) 500 MG 24 hr tablet, Take 1 tablet (500 mg) by mouth 2 times daily (with meals)  metoprolol tartrate (LOPRESSOR) 25 MG tablet, TAKE 1 TABLET (25 MG TOTAL) BY MOUTH 2 (TWO) TIMES A DAY FOR BLOOD  PRESSURE  montelukast (SINGULAIR) 10 MG tablet, Take 1 tablet (10 mg) by mouth At Bedtime  omeprazole (PRILOSEC) 40 MG DR capsule, Take 1 capsule (40 mg) by mouth daily  Polyethylene Glycol 3350 (PEG 3350) 17 GM/SCOOP POWD, MIX 17 GRAMS WITH LIQUID AND DRINK ONCE DAILY FOR CONSTIPATION  sucralfate (CARAFATE) 1 GM/10ML suspension, Take 10 mLs (1 g) by mouth 4 times daily  tiotropium (SPIRIVA RESPIMAT) 2.5 MCG/ACT inhaler, Inhale 2 puffs into the lungs daily    tezepelumab-ekko (TEZSPIRE) injection 210 mg         PHYSICAL EXAMINATION  VITALS: /88   Pulse 96   Resp 16   Wt 55.8 kg (123 lb)   BMI 21.79 kg/m    GENERAL: Healthy appearing, alert, no acute distress, normal habitus.  CARDIOVASCULAR: Extremities warm and well perfused. Pulses present.   NEUROLOGICAL:  Patient is awake and oriented to self, place and time.  Attention span is normal.  Memory is grossly intact.  Language is fluent and follows commands appropriately.  Appropriate fund of knowledge. Cranial nerves 2-12 are intact. There is no pronator drift.  Motor exam shows  4/5 strength in all extremities.  Generalized weakness due to pain all over.  Tone is symmetric bilaterally in upper and lower extremities.  Previously reflexes are symmetric and 1+ in upper extremities and lower extremities. Sensory exam is grossly intact to light touch, pin prick and vibration.  Finger to nose and heel to shin is without dysmetria.  Romberg is negative.  Gait is normal and the patient is able to do tandem walk and walk on toes and heels with mild difficulty.  Orthostatic vitals    Exam shows some worsening left hip flexion weakness related to pain.  Possibly this is more knee pain than neurological pain.      DIAGNOSTICS  MRI 2022  IMPRESSION:  1.  No acute intracranial abnormalities identified.  2.  Minor chronic small vessel ischemic change, otherwise unremarkable contrast-enhanced MRI of the brain.    MRA                                                                IMPRESSION:  1.  Normal MRA [place].      MRA neck  IMPRESSION:  1.  Normal neck MRA.    Cardiac event monitor    ECHo  Left ventricular size, wall motion and function are normal. The ejection  fraction is 55-60%.  There is borderline anterior, septal, and apical wall hypokinesis.  Normal right ventricle size and systolic function.  No hemodynamically significant valvular abnormalities on 2D or color flow  Imaging.      CTA 2021  HEAD CT:  1.  No evidence of acute hemorrhage, mass effect, or acute vascular territorial infarction.  2.  Severe left sphenoid sinus disease.     HEAD CTA:   1.  No evidence of proximal large vessel occlusion or flow-limiting stenosis.  2.  Dorsally directed 2 mm contour irregularity arising from the distal left supraclinoid ICA may represent a tiny posterior communicating artery aneurysm.     NECK CTA:  1.  No hemodynamically significant stenosis based on NASCET criteria.  2.  Mild left V1 segment stenosis.  3.  No evidence for dissection.      MRI 2020  IMPRESSION:  MRI BRAIN:  1. No finding for acute infarct, hemorrhage, or mass.  2. There are a few very small foci of FLAIR hyperintensity within the cerebral white matter, nonspecific but compatible with small areas of gliosis and/or chronic myelin loss.     MRA [place]:  1. Congenital variation of the [place] without vascular cutoff, aneurysm, or flow-limiting stenosis.      MRI 2021  HEAD MRI:   1.  Tiny linear focus of diffusion restriction within the right dorsal diomedes suspicious for acute small vessel infarct. This is new compared to 12/07/2020.  2.  No hemorrhagic transformation or mass effect. No additional infarct identified.  3.  Mild presumed microvascular ischemic change within the cerebral white matter.     HEAD MRA:   1.  No aneurysm, high flow AVM or significant stenosis identified.  2.  Variant [place] anatomy as above.     NECK MRA:  1.  Evaluation degraded by suboptimal timing of  the contrast bolus and significant venous contamination the gadolinium bolus MRA.  2.  No hemodynamically significant stenosis in the carotid circulation by NASCET criteria.  3.  Poor visualization of the left vertebral artery origin and proximal left V1 segment. This may be artifactual, but it is difficult to exclude high-grade stenosis in this region.      MRI 2021  IMPRESSION:  1.  No mass, hemorrhage, or recent infarct identified.  2.  Diffusion abnormality involving the right ventral diomedes seen on 01/13/2021 is no longer identified. No definite residual signal abnormality in this location to confirm prior infarct. This may relate to small size and slice selection.  3.  Inflammatory changes of the paranasal sinuses including bilateral maxillary air-fluid levels. Correlate with clinical evidence for sinusitis.      RELEVANT LABS  Component      Latest Ref Rng & Units 11/30/2022 2/5/2023   WBC      4.0 - 11.0 10e3/uL  9.3   RBC Count      3.80 - 5.20 10e6/uL  4.59   Hemoglobin      11.7 - 15.7 g/dL  11.8   Hematocrit      35.0 - 47.0 %  38.1   MCV      78 - 100 fL  83   MCH      26.5 - 33.0 pg  25.7 (L)   MCHC      31.5 - 36.5 g/dL  31.0 (L)   RDW      10.0 - 15.0 %  15.1 (H)   Platelet Count      150 - 450 10e3/uL  228   % Neutrophils      %  69   % Lymphocytes      %  14   % Monocytes      %  6   % Eosinophils      %  11   % Basophils      %  0   % Immature Granulocytes      %  0   NRBCs per 100 WBC      <1 /100  0   Absolute Neutrophils      1.6 - 8.3 10e3/uL  6.4   Absolute Lymphocytes      0.8 - 5.3 10e3/uL  1.3   Absolute Monocytes      0.0 - 1.3 10e3/uL  0.6   Absolute Eosinophils      0.0 - 0.7 10e3/uL  1.0 (H)   Absolute Basophils      0.0 - 0.2 10e3/uL  0.0   Absolute Immature Granulocytes      <=0.4 10e3/uL  0.0   Absolute NRBCs      10e3/uL  0.0   Sodium      136 - 145 mmol/L  138   Potassium      3.4 - 5.3 mmol/L  4.2   Chloride      98 - 107 mmol/L  102   Carbon Dioxide (CO2)      22 - 29 mmol/L  25    Anion Gap      7 - 15 mmol/L  11   Urea Nitrogen      6.0 - 20.0 mg/dL  10.6   Creatinine      0.51 - 0.95 mg/dL  0.58   Calcium      8.6 - 10.0 mg/dL  9.5   Glucose      70 - 99 mg/dL  156 (H)   GFR Estimate      >60 mL/min/1.73m2  >90   Hemoglobin A1C      0.0 - 5.6 % 8.6 (H)      OUTSIDE RECORDS  Outside referral notes and chart notes were reviewed and pertinent information has been summarized (in addition to the HPI):- Dr. Multani notes reviewed      ER notes above reviewed    Component      Latest Ref Rng 9/14/2023  9:21 AM 10/1/2023  9:42 AM   Sodium      135 - 145 mmol/L  136    Potassium      3.4 - 5.3 mmol/L  4.3    Chloride      98 - 107 mmol/L  102    Carbon Dioxide (CO2)      22 - 29 mmol/L  22    Anion Gap      7 - 15 mmol/L  12    Urea Nitrogen      6.0 - 20.0 mg/dL  9.2    Creatinine      0.51 - 0.95 mg/dL  0.53    GFR Estimate      >60 mL/min/1.73m2  >90    Calcium      8.6 - 10.0 mg/dL  9.1    Glucose      70 - 99 mg/dL  201 (H)    WBC      4.0 - 11.0 10e3/uL  7.2    RBC Count      3.80 - 5.20 10e6/uL  4.73    Hemoglobin      11.7 - 15.7 g/dL  11.6 (L)    Hematocrit      35.0 - 47.0 %  38.4    MCV      78 - 100 fL  81    MCH      26.5 - 33.0 pg  24.5 (L)    MCHC      31.5 - 36.5 g/dL  30.2 (L)    RDW      10.0 - 15.0 %  15.7 (H)    Platelet Count      150 - 450 10e3/uL  230    CRP Inflammation      <5.00 mg/L 3.20        Legend:  (H) High  (L) Low    LABS  Component      Latest Ref Rng 11/7/2023  10:46 AM   Sodium      135 - 145 mmol/L 138    Potassium      3.4 - 5.3 mmol/L 4.0    Carbon Dioxide (CO2)      22 - 29 mmol/L 25    Anion Gap      7 - 15 mmol/L 13    Urea Nitrogen      6.0 - 20.0 mg/dL 13.3    Creatinine      0.51 - 0.95 mg/dL 0.59    GFR Estimate      >60 mL/min/1.73m2 >90    Calcium      8.6 - 10.0 mg/dL 9.3    Chloride      98 - 107 mmol/L 100    Glucose      70 - 99 mg/dL 184 (H)    Alkaline Phosphatase      35 - 104 U/L 116 (H)    AST      0 - 45 U/L 48 (H)    ALT      0 - 50 U/L  41    Protein Total      6.4 - 8.3 g/dL 7.1    Albumin      3.5 - 5.2 g/dL 4.1    Bilirubin Total      <=1.2 mg/dL 0.3    KATY-1 (Histidyl-tRNA Synthetase) Bere IgG      0 - 40 AU/mL 1    PL-12 (alanyl-tRNA synthetase) Antibody      Negative  Negative    PL-7 (threonyl-tRNA synthetase) Antibody      Negative  Negative    EJ (glycyl - tRNA synthetase) Antibody      Negative  Negative    OJ (isoleucyl-tRNA synthetase) Antibody      Negative  Negative    SRP (Signal Recognition Particle) Antibody      Negative  Negative    Mi-2 (nuclear helicase protein) Antibody      Negative  Negative    P155/140 (TIF1-gamma) Antibody      Negative  Negative    TIF-1 Gamma Antibody      Negative  Negative    SAE1 (SUMO activating enzyme) Bere      Negative  Negative    MDA5 (CADM 140) Bere      Negative  Negative    NXP-2 (Nuclear matrix protein 2) Bere      Negative  Negative    Myositis Interp See Note    Albumin Fraction      3.7 - 5.1 g/dL 3.8    Alpha 1 Fraction      0.2 - 0.4 g/dL 0.3    Alpha 2 Fraction      0.5 - 0.9 g/dL 0.7    Beta Fraction      0.6 - 1.0 g/dL 0.9    Gamma Fraction      0.7 - 1.6 g/dL 1.0    Monoclonal Peak      <=0.0 g/dL 0.0    ELP Interpretation: Essentially normal electrophoretic pattern. No obvious monoclonal proteins seen. Pathologic significance requires clinical correlation. Marlene Zamudio M.D., Ph.D.    Vitamin B12      232 - 1,245 pg/mL 602    Vitamin B1 Whole Blood Level      70 - 180 nmol/L 111    TSH      0.30 - 4.20 uIU/mL 0.35    Hemoglobin A1C      0.0 - 5.6 % 11.1 (H)    Magnesium      1.7 - 2.3 mg/dL 1.7    CK Total      26 - 192 U/L 59    Ferritin      11 - 328 ng/mL 26    Immunofixation ELP No monoclonal protein seen on immunofixation. Pathologic significance requires clinical correlation. Marlene Zamudio M.D., Ph.D.    Total Protein Serum for ELP      6.4 - 8.3 g/dL 6.7       Legend:  (H) High  ER notes reviewed.    CLINICAL INTERPRETATION:  This is an abnormal nerve conduction and EMG study.  The  needle study shows increased spontaneous activity in bilateral L4/L5/S1 muscle groups.  This can be seen in patients with lumbar radiculopathies.  Further clinical correlation is needed.     MRI L spine 2021  IMPRESSION:   1.  Small left paracentral L5-S1 disc protrusion. Mild left L5-S1 foraminal stenosis.     2.  Minor degenerative disc changes at L4-L5.       IMPRESSION/REPORT/PLAN  Cerebrovascular accident of right pontine structure (H)  Medication overuse headache  Vertigo  Light headed  Intractable chronic migraine without aura and without status migrainosus  Bilateral leg weakness/muscle cramps versus knee pain  Left leg weakness  Low ferritin    This is a 54 year old female history of right pontine stroke thought to be due to vascular risk factors.  Currently she is on aspirin for stroke prevention.  Further management of vascular risk factors per primary care.  No new stroke symptoms.    She does complain of vertigo and most likely this is related to her stroke.  Meclizine is currently helping and should continue.  She is tried vestibular rehab without any benefit.  Could be related to headaches also.  There might not be any further treatment options available for this.  No change.    In terms of her headaches these are most suggestive of medication overuse headaches since she is overusing Tylenol.  Encourage her not to use the Tylenol more than 2-3 times per week; reemphasized this today.  With cutting down on the Tylenol there has been some improvement.  Amitriptyline at higher doses is also helping.  Topamax was not effective/caused side effects.  She is continues to have headaches every day.  We will try Emgality. Tylenol is sufficient for abortive therapy.  Her severe headaches do have migraine features and could be migraine headaches.  Amitriptyline was initially prescribed through primary care.  Headaches seem to be under good control with the Emgality and discussed what to expect and that the  headaches will never completely go away completely.  Continue Tylenol for abortive therapy.    She does complain of some lightheadedness and orthostatic vitals were negative.  Most likely this is unrelated to the stroke.  Further management per primary care.  Encourage good hydration.  No change.  She is drinking plenty of water.    She does complain of new bilateral leg weakness/muscle cramps.  On exam she has generalized weakness related to her muscle pain.  Increased left leg weakness on exam today though this might be more knee pain than muscle pain.  EMG suggested a lumbar radiculopathy and will await MRI L-spine to further evaluate.  MRI C-spine is also pending.  Blood work did show low ferritin and we will put her on iron supplement to see if that helps with any of the muscle spasms at nighttime.  Also started on tizanidine for muscle spasms.     For the left knee pain previously she was referred to orthopedics.     We will check blood work/EMG to look for causes of myopathy.  We will also check MRI L-spine/C-spine to further evaluate her leg weakness.  He also points to her knee and she might have more knee pain.  Will refer to orthopedics.    I can see her back in 1 months after testing.    -     topiramate (TOPAMAX) 25 MG tablet; Take 1 tablet at night. Can increase to 2 tabs/night if needed. -_STOP  -     amitriptyline (ELAVIL) 50 MG tablet; Take 1 tablet (50 mg) by mouth At Bedtime  -     galcanezumab-gnlm (EMGALITY) 120 MG/ML injection; Inject 2 mLs (240 mg) Subcutaneous every 28 days Loading dose.  -     galcanezumab-gnlm (EMGALITY) 120 MG/ML injection; Inject 1 mL (120 mg) Subcutaneous every 28 days    -     MR Lumbar Spine w/o Contrast; Future  -     MR Cervical Spine w/o Contrast; Future  -     Orthopedic  Referral; Future    -     tiZANidine (ZANAFLEX) 2 MG tablet; Take 1 tablet (2 mg) by mouth 3 times daily as needed for muscle spasms  -     ferrous sulfate (FEROSUL) 325 (65 Fe) MG  tablet; Take 1 tablet (325 mg) by mouth daily (with breakfast)    Return in about 1 month (around 2/16/2024) for In-Clinic Visit (must), Add on PAOR, After testing.    Over 40 minutes were spent coordinating the care for the patient on the day of the encounter.  This includes previsit, during visit and post visit activities as documented above.  Counseling patient.  Prescription management.  Multiple test reviewed.  Use of  requires extra time.  Multiple problems reviewed.  (Activities include but not inclusive of reviewing chart, reviewing outside records, reviewing labs and imaging study results as well as the images, patient visit time including getting history and exam,  use if applicable, review of test results with the patient and coming up with a plan in a shared model, counseling patient and family, education and answering patient questions, EMR , EMR diagnosis entry and problem list management, medication reconciliation and prescription management if applicable, paperwork if applicable, printing documents and documentation of the visit activities.)        Guerrero Prescott MD  Neurologist  [facility]  Tel:- [phone]    This note was dictated using voice recognition software.  Any grammatical or context distortions are unintentional and inherent to the software.

## 2024-01-17 ENCOUNTER — TELEPHONE (OUTPATIENT)
Dept: FAMILY MEDICINE | Facility: CLINIC | Age: 54
End: 2024-01-17

## 2024-01-17 DIAGNOSIS — J44.9 CHRONIC OBSTRUCTIVE PULMONARY DISEASE, UNSPECIFIED COPD TYPE (H): ICD-10-CM

## 2024-01-17 NOTE — TELEPHONE ENCOUNTER
Your first opening is 02/01 for an approval slot.   Ok to wait or would like add sooner into your schedule?

## 2024-01-17 NOTE — TELEPHONE ENCOUNTER
Reason for Call:  Appointment Request    Patient requesting this type of appt:  Hospital/ED Follow-Up     Requested provider: Adrien Cardoza    Reason patient unable to be scheduled: Not with their preferred provider    When does patient want to be seen/preferred time: 3-7 days    Comments: Billy (on c2c file) state patient neurology recommends patient to schedule a EDF ollow up with PCP due to patient hemoglobin is too low. Caller declined to schedule appt with other provider in clinic Caller request for writer to send Dr. Cardoza a message.     Could we send this information to you in [facility] or would you prefer to receive a phone call?:   Patient would prefer a phone call   Okay to leave a detailed message?: Yes at Cell number on file:    Telephone Information:   Mobile [phone]       Call taken on 1/17/2024 at 1:26 PM by Amelia Villegas

## 2024-01-18 DIAGNOSIS — Z53.9 DIAGNOSIS NOT YET DEFINED: Primary | ICD-10-CM

## 2024-01-18 DIAGNOSIS — D64.9 ANEMIA, UNSPECIFIED TYPE: Primary | ICD-10-CM

## 2024-01-18 PROCEDURE — G0179 MD RECERTIFICATION HHA PT: HCPCS | Performed by: FAMILY MEDICINE

## 2024-01-18 RX ORDER — TIOTROPIUM BROMIDE INHALATION SPRAY 3.12 UG/1
2 SPRAY, METERED RESPIRATORY (INHALATION) DAILY
Qty: 4 G | Refills: 1 | Status: SHIPPED | OUTPATIENT
Start: 2024-01-18 | End: 2024-03-05

## 2024-01-18 NOTE — TELEPHONE ENCOUNTER
I called pt using a [place]ic . Spoke with pt's daughter majority of the time. Informed family that pt was seen 1 year ago and will need to schedule a follow up appt with Daily Goldberg for medication renewal. Daily did provide the pt with refills so she don't run out but we would like pt to schedule an appt in 1-2 months. Family verbally understood. Transferred pt to the  to schedule an appt.     Pt's daughter did confirm that the pt is taking gabapentin 600 mg 1 tablet TID.

## 2024-01-18 NOTE — TELEPHONE ENCOUNTER
Patient has MTM appointment on 01/22. Daughter requesting lab order be placed to recheck hemoglobin. Pls advise and OR can add patient to lab schedule.

## 2024-01-22 ENCOUNTER — OFFICE VISIT (OUTPATIENT)
Dept: PHARMACY | Facility: CLINIC | Age: 54
End: 2024-01-22
Payer: COMMERCIAL

## 2024-01-22 ENCOUNTER — LAB (OUTPATIENT)
Dept: LAB | Facility: CLINIC | Age: 54
End: 2024-01-22
Payer: COMMERCIAL

## 2024-01-22 VITALS — DIASTOLIC BLOOD PRESSURE: 70 MMHG | OXYGEN SATURATION: 98 % | HEART RATE: 83 BPM | SYSTOLIC BLOOD PRESSURE: 120 MMHG

## 2024-01-22 DIAGNOSIS — K59.00 CONSTIPATION, UNSPECIFIED CONSTIPATION TYPE: ICD-10-CM

## 2024-01-22 DIAGNOSIS — R52 PAIN: ICD-10-CM

## 2024-01-22 DIAGNOSIS — Z79.4 TYPE 2 DIABETES MELLITUS TREATED WITH INSULIN (H): Primary | ICD-10-CM

## 2024-01-22 DIAGNOSIS — E78.5 HYPERLIPIDEMIA, UNSPECIFIED HYPERLIPIDEMIA TYPE: ICD-10-CM

## 2024-01-22 DIAGNOSIS — E11.9 TYPE 2 DIABETES MELLITUS TREATED WITH INSULIN (H): ICD-10-CM

## 2024-01-22 DIAGNOSIS — I31.9 PERICARDITIS, UNSPECIFIED CHRONICITY, UNSPECIFIED TYPE: ICD-10-CM

## 2024-01-22 DIAGNOSIS — Z79.4 TYPE 2 DIABETES MELLITUS TREATED WITH INSULIN (H): ICD-10-CM

## 2024-01-22 DIAGNOSIS — J45.50 SEVERE PERSISTENT ASTHMA, UNSPECIFIED WHETHER COMPLICATED (H): ICD-10-CM

## 2024-01-22 DIAGNOSIS — E11.9 TYPE 2 DIABETES MELLITUS TREATED WITH INSULIN (H): Primary | ICD-10-CM

## 2024-01-22 DIAGNOSIS — I10 ESSENTIAL HYPERTENSION: ICD-10-CM

## 2024-01-22 DIAGNOSIS — F33.9 RECURRENT MAJOR DEPRESSIVE DISORDER, REMISSION STATUS UNSPECIFIED (H): ICD-10-CM

## 2024-01-22 DIAGNOSIS — K21.9 GASTROESOPHAGEAL REFLUX DISEASE WITHOUT ESOPHAGITIS: ICD-10-CM

## 2024-01-22 DIAGNOSIS — G89.29 CHRONIC NONINTRACTABLE HEADACHE, UNSPECIFIED HEADACHE TYPE: ICD-10-CM

## 2024-01-22 DIAGNOSIS — R51.9 CHRONIC NONINTRACTABLE HEADACHE, UNSPECIFIED HEADACHE TYPE: ICD-10-CM

## 2024-01-22 LAB
HBA1C MFR BLD: 10.9 % (ref 0–5.6)
HGB BLD-MCNC: 12 G/DL (ref 11.7–15.7)

## 2024-01-22 PROCEDURE — 83036 HEMOGLOBIN GLYCOSYLATED A1C: CPT

## 2024-01-22 PROCEDURE — 99607 MTMS BY PHARM ADDL 15 MIN: CPT | Performed by: PHARMACIST

## 2024-01-22 PROCEDURE — 36415 COLL VENOUS BLD VENIPUNCTURE: CPT

## 2024-01-22 PROCEDURE — 85018 HEMOGLOBIN: CPT | Performed by: FAMILY MEDICINE

## 2024-01-22 PROCEDURE — 99605 MTMS BY PHARM NP 15 MIN: CPT | Performed by: PHARMACIST

## 2024-01-22 RX ORDER — METFORMIN HCL 500 MG
1000 TABLET, EXTENDED RELEASE 24 HR ORAL 2 TIMES DAILY WITH MEALS
Qty: 360 TABLET | Refills: 3 | Status: SHIPPED | OUTPATIENT
Start: 2024-01-22

## 2024-01-22 NOTE — PROGRESS NOTES
Addended by: SLADE MCCLELLAND on: 9/24/2019 03:13 PM     Modules accepted: Orders     Medication Therapy Management (MTM) Encounter    ASSESSMENT:                            Medication Adherence/Access: See below for considerations. Recommended patient bring pillbox for further assessment of adherence/set up at next visit.     1. Type 2 diabetes mellitus treated with insulin (H)  A1c elevated, not meeting goal <7%.  Patient would benefit from increasing both metformin dose and and bolus insulin doses, sending updated prescriptions today.     2. Essential hypertension  BP meeting goal of <130/80 mmHg. For now, continue current medications without change.  Although, given persistent episodes of dizziness, especially since recently adding tizanidine to beta-blocker use, could consider discontinuing diuretic and alternatively reassessing need for additional antihypertensive.  If needed, would recommend adding ACE/ARB at low-dose and slowly titrating.    3. Hyperlipidemia, unspecified hyperlipidemia type  Patient appropriately taking high intensity statin and LDL at goal.  Of note, patient mentions symptoms of bilateral leg pain which has been ongoing though patient not interested in adjusting medication today, could consider assessing for statin-induced myopathy in the future, will defer to PCP to further assess.     4. Pericarditis, unspecified chronicity, unspecified type  Managed by patient's cardiologist, continues to take colchicine daily as prescribed.    5. Severe persistent asthma/COPD/allergies  Managed by patient's pulmonologist and allergist.  Updated medication list to reflect what patient is currently using.  Of note, identified drug drug interaction between Spiriva use and anticholinergic agents: loratadine, meclizine, cyclobenzaprine, and amitriptyline and the need to recommend close monitoring for anticholinergic symptoms such as constipation and tachycardia which patient has a history of.  Would recommend reconsidering continued use of amitriptyline for migraines, cyclobenzaprine for  "muscle spasms, and loratadine or meclizine.     6. Pain  Expressed concern for use of cyclobenzaprine and tizanidine for increased CNS effects when used in combination and with patients other medications that can also increase this effect, such as gabapentin. Also as noted above for increased anticholinergic effects of cyclobenzaprine in combination with Spiriva, amitriptyline, etc. Encouraged only very minimal use of these agents as able and for provider to consider alternative options or remove unnecessary therapy to avoid medication drug interactions.      7. Recurrent major depressive disorder, remission status unspecified (H24)  Stable on current therapy at this time.     8. Chronic nonintractable headache, unspecified headache type  Managed by patients neurologist.  As noted above, due to concerns for drug drug interactions, could consider alternative agent to amitriptyline due to increased anticholinergic effects, CNS depressant effects, and serotonin toxicity concern.    9. Gastroesophageal reflux disease without esophagitis  Reports stable on current therapy.    10. Constipation, unspecified constipation type  Stable on current therapy.    PLAN:                            Increase dose: Metformin  2 tablets twice daily   Increase dose: Novolog 20 units twice daily before meals  Patient to use diclofenac 1% gel 4 times daily to lower back  Routing to neurology regarding concerns for amitriptyline drug interactions  Routing to spine center regarding concerns for cyclobenzaprine drug interactions  Encouraged only minimal use of cyclobenzaprine and tizanidine use and further discussion with prescribing providers regarding drug interaction concerns  Update (1/23): message from spine center ([facility] Goldberg): \"I have not seen this patient in over a year and only recommended gabapentin medication at that time. This should be referred to his primary care provider or current prescribing provider.\"  Given no " provider recommending continuation of cyclobenzaprine at this time, despite current prescription with spine center as provider, recommend discontinuing therapy (removed from medication list today).     Medication issues to be addressed at a future visit:   Switch hydrochlorothiazide to ACE/ARB?    Follow-up: Return in about 9 weeks (around 3/25/2024) for With PharmD.  PCP 2/15; pulm 3/8;Neuro 3/18  SUBJECTIVE/OBJECTIVE:                          Kulwant Amin is a 54 year old female coming in for a follow-up visit from 11/7/23. Patient was accompanied by daughter in law. Patient seen with [facility] .  ID# [id number]. Also seen in ED on 1/8 and 1/10, prednisone given.     Reason for visit: MTM follow up.    Allergies/ADRs: Reviewed in chart  Past Medical History: Reviewed in chart  Tobacco: She reports that she quit smoking about 21 years ago. Her smoking use included cigarettes. She has a 30 pack-year smoking history. She has never been exposed to tobacco smoke. She has never used smokeless tobacco.  Alcohol: none    Medication Adherence/Access: Patient has a home health nurse that sets up a three times daily pillbox. Patient brings with her medication vials today but does NOT bring her pillbox. Reports no missed doses.     Diabetes   Metformin  mg twice daily with food  Lantus 44 units once daily at night  Novolog 16 units twice daily 10 minutes before meals  Report no missed doses of insulin. Daughter in law helps patient with insulin administration.   Aspirin 81mg daily  States that her sugar is high and she doesn't know why.   Blood sugar monitoring: Freestyle Gemma CGM; see below.   Current diabetes symptoms: polyuria, polydipsia, fatigue, and vision changes  Diet/Exercise: Reports eating 2 meals per day, 11 am and 6:30-7, usually eats brown rice, beans, lentils, veggies. Only eating a small amount of rice. Eating yogurt, herbal tea, no juice or soda. States that she hasn't made any dietary changes since  last visit. For snacks will sometimes eat tea.   Med Hx: No longer experiencing nausea/vomiting since switching metformin IR to ER.     Eye exam is up to date  Foot exam is up to date  Urine Albumin:   Lab Results   Component Value Date    UMALCR  11/07/2023      Comment:      Unable to calculate, urine albumin and/or urine creatinine is outside detectable limits.  Microalbuminuria is defined as an albumin:creatinine ratio of 17 to 299 for males and 25 to 299 for females. A ratio of albumin:creatinine of 300 or higher is indicative of overt proteinuria.  Due to biologic variability, positive results should be confirmed by a second, first-morning random or 24-hour timed urine specimen. If there is discrepancy, a third specimen is recommended. When 2 out of 3 results are in the microalbuminuria range, this is evidence for incipient nephropathy and warrants increased efforts at glucose control, blood pressure control, and institution of therapy with an angiotensin-converting-enzyme (ACE) inhibitor (if the patient can tolerate it).        Lab Results   Component Value Date    A1C 10.9 (H) 01/22/2024          Hypertension /CAD/SVT  Hydrochlorothiazide 12.5 mg daily  Metoprolol tartrate 25 mg twice daily   Patient reports the following medication side effects: dizziness which has been the same as before. Reports feeling dizzy 1-2 times daily, still taking meclizine three times daily as needed (though didn't bring this today). Feeling tachycardic all the time.   Patient self-monitors blood pressure, but not really checking lately.   Cardiology: Dr. Hirsch     BP Readings from Last 3 Encounters:   01/22/24 120/70   01/16/24 134/88   01/10/24 120/72     Pulse Readings from Last 3 Encounters:   01/22/24 83   01/16/24 96   01/10/24 85     Hyperlipidemia /Hx pontine stroke  Atorvastatin 80 mg daily  Aspirin 81 mg daily (per neurology)  Patient reports muscle pains bilaterally in her legs. States that this has been present for  a long time and her doctors are already aware of this.   The ASCVD Risk score (Doris DK, et al., 2019) failed to calculate for the following reasons:    The patient has a prior MI or stroke diagnosis     Recent Labs   Lab Test 03/14/23  1140 02/16/23  1137 03/25/22  1200   CHOL 125  --  145   HDL 41*  --  52   LDL 41 45 54   TRIG 214*  --  197*     Allergies/COPD/asthma/hypereosinophilia:   Loratadine 10mg daily - unclear if still taking, last filed in october  Montelukast 10 mg daily at bedtime  Breo Ellipta 200/25mcg 1 puff daily at night  Spiriva 2.5 mcg 2 puffs daily at night  Albuterol HFA daily as needed   Albuterol nebs three times daily   Tezspire 210 mg every 28 days (Dupixent stopped, agree to remove from med list today)  Dextromethorphan 10 mL twice daily  - not using this, has not had it for a long time  Benzonatate 100 mg twice daily as needed (does not bring with today, doesn't get anymore)  States that when she is coughing it causes trouble for her sleep, usually 2-3 times per night, states pulmonologist is aware of this.   Pulmonologist: [facility], Tamra Mohr.   Allergy & immunology: [facility], Dr. Elizabeth Marie.     Pain  Gabapentin 600 mg three times daily   Cyclobenzaprine 5 mg THREE TIMES DAILY prn (taking separately from pillbox as needed, using more lately with back pain)  Tizanidine 2 mg three times daily prn (DIL states this is set up in the pillbox) new per juan for leg pain/spasticity   Diclofenac 1% gel 4g 4 times daily - but not consistently  Describing back pain that has been bothering her, describing as very stiff and making it hard to sit. States that this has been going for a long time but recently getting worse. Hard to sit and stand. Notes that since starting tizanidine, her leg pain has improved. Notes that the neurologist did recommended using the the tizanidine sparingly.     Mood  Fluoxetine 10 mg daily  Reports that sometimes her mood is up and  sometimes down. Overall stable but states that COPD cough affects her sleep.       8/8/2023     9:21 AM 10/5/2023    10:41 AM 12/5/2023     8:43 AM   PHQ   PHQ-9 Total Score 16 7 7   Q9: Thoughts of better off dead/self-harm past 2 weeks Not at all Not at all Not at all     Migraine/headache  Amitriptyline 50 mg daily at bedtime  Acetaminophen three times daily as needed for headaches, taking every day  Seeing neurologist, Dr. Prescott.   Emgality - still getting migraines, reports still daily headaches    GERD  Omeprazole 40 mg daily in the morning  Sucralfate 1g 3 times daily (does not bring with today but still taking)  Tums 1 tablet twice daily PRN   Reports well controlled symptoms with current regimen.     Constipation  Docusate 100 mg twice daily   Miralax 17 g daily  Thinks current therapy is helpful.     Pericarditis    Colchicine 0.6 mg daily (started 7/24)  Reports improvement of chest pain.     Today's Vitals: /70   Pulse 83   SpO2 98%   ----------------  Post Discharge Medication Reconciliation Status (ED visits on 1/8 &1/10): discharge medications reconciled, continue medications without change.    I spent 60 minutes with this patient today. All changes were made via collaborative practice agreement with Adrien Cardoza MD. A copy of the visit note was provided to the patient's provider(s).    A summary of these recommendations was sent via [employer].    Malu Gil, PharmD, BCACP  Medication Therapy Management Pharmacist     Medication Therapy Recommendations  Pain    Current Medication: diclofenac (VOLTAREN) 1 % topical gel   Rationale: Does not understand instructions - Adherence - Adherence   Recommendation: Provide Adherence Intervention   Status: Patient Agreed - Adherence/Education         Type 2 diabetes mellitus treated with insulin (H)    Current Medication: insulin aspart (NOVOLOG PEN) 100 UNIT/ML pen   Rationale: Dose too low - Dosage too low - Effectiveness   Recommendation:  Increase Dose   Status: Accepted per CPA          Current Medication: metFORMIN (GLUCOPHAGE XR) 500 MG 24 hr tablet   Rationale: Dose too low - Dosage too low - Effectiveness   Recommendation: Increase Dose   Status: Accepted per CPA

## 2024-01-22 NOTE — Clinical Note
I wanted you to be aware of my concerns for medications interactions with this patient as follows:  1. Spiriva with other anticholinergics (loratadine, meclizine, cyclobenzaprine, and amitriptyline) can increase risk of anticholinergic effects such as dry mouth, constipation, tachycardia, etc.  2. Persistent dizziness, I wonder if we can stop her hydrochlorothiazide and recheck BP after a few weeks, maybe we could switch her to ACE/ARB to see if dizziness improves? There are other drug interactions that were identified but these were the major ones I wanted to bring to your attention. Please let me know your thoughts. I am worried that there may be too many specialists providing care and not coordinating care.   Thanks Jaylon

## 2024-01-22 NOTE — Clinical Note
Daily Goldberg,  I wanted to bring to your attention my concern for patients use of cyclobenzaprine. Unfortunately this medication interacts with several other of her medications.  1. Spiriva/loratadine/meclizine (increased anticholinergic effects)  2. Fluoxetine/amitriptyline (increased serotonin toxicity risk)  3. Gabapentin/tizanidine/meclizine (increased CNS depressants)  Additionally, I am questioning efficacy of medication, given patient continues to complain of pain. For now I suggested that she use more of the diclofenac gel on that area topically for pain and only cyclobenzaprine very sparingly, and readdress use of cyclobenzaprine at your next visit.  Perhaps there is an alternative agent that can be considered.  Thank you and let  me know if you have any questions  Malu Gil, PharmD, [facility]  Medication Therapy Management Pharmacist

## 2024-01-22 NOTE — PATIENT INSTRUCTIONS
"Recommendations from today's MTM visit:                                                    MTM (medication therapy management) is a service provided by a clinical pharmacist designed to help you get the most of out of your medicines.      Diclofenac gel 1% on lower back  Increase dose: Metformin  2 tablets twice daily   Increase dose: Novolog 20 units twice daily before meals    Follow-up: Return in about 9 weeks (around 3/25/2024) for With PharmD.    It was great speaking with you today.  I value your experience and would be very thankful for your time in providing feedback in our clinic survey. In the next few days, you may receive an email or text message from [employer] with a link to a survey related to your  clinical pharmacist.\"     To schedule another MTM appointment, please call the clinic directly or you may call the MTM scheduling line at [phone].    My Clinical Pharmacist's contact information:                                                      Please feel free to contact me with any questions or concerns you have.      Malu Gil, PharmD, BCACP  Medication Therapy Management Pharmacist    "

## 2024-01-22 NOTE — Clinical Note
Dr. Prescott, I wanted to bring to your attention my concern for patients use of amitriptyline. Unfortunately this medication interacts with several other of her medications.  1. Spiriva/loratadine/meclizine (increased anticholinergic effects) 2. Fluoxetine (increased serotonin toxicity risk) 3. Gabapentin/meclizine (increased CNS depressants) Additionally, patient describes that she continues to have persistent daily headaches. I am wondering if there is an alternative agent that can be considered or perhaps a trial off of amitriptyline to see how patient responds.  May be worth a referral to neurology MTM such as Triny to see if she has any suggestions for patient?   Thank you and let  me know if you have any questions Malu Gil, PharmD, BCACP Medication Therapy Management Pharmacist

## 2024-02-05 ENCOUNTER — TELEPHONE (OUTPATIENT)
Dept: ALLERGY | Facility: CLINIC | Age: 54
End: 2024-02-05
Payer: COMMERCIAL

## 2024-02-05 NOTE — TELEPHONE ENCOUNTER
PA Initiation    Medication: DUPIXENT 300 MG/2ML SC SOPN  Insurance Company: [employer]-Specialty PA's - Phone [phone] Fax [phone]  Pharmacy Filling the Rx: [facility] [address]  Filling Pharmacy Phone:    Filling Pharmacy Fax:    Start Date: 2/5/2024  Prior Authorization Approval    Medication: DUPIXENT 300 MG/2ML SC SOPN  Authorization Effective Date: 2/12/2024  Authorization Expiration Date: 2/4/2025  Approved Dose/Quantity: 4ml  Reference #:     Insurance Company: [employer]-Specialty PA's - Phone [phone] Fax [phone]  Expected CoPay: $    CoPay Card Available:      Financial Assistance Needed: ariel  Which Pharmacy is filling the prescription: [facility] [address]  Pharmacy Notified: ariel           Patient Notified: ariel schmitt

## 2024-02-11 ENCOUNTER — HOSPITAL ENCOUNTER (OUTPATIENT)
Dept: MRI IMAGING | Facility: HOSPITAL | Age: 54
Discharge: HOME | End: 2024-02-11
Attending: PSYCHIATRY & NEUROLOGY
Payer: COMMERCIAL

## 2024-02-11 DIAGNOSIS — R25.2 LEG CRAMPS: ICD-10-CM

## 2024-02-11 DIAGNOSIS — R29.898 WEAKNESS OF BOTH LOWER EXTREMITIES: ICD-10-CM

## 2024-02-11 PROCEDURE — 72148 MRI LUMBAR SPINE W/O DYE: CPT

## 2024-02-11 PROCEDURE — 72141 MRI NECK SPINE W/O DYE: CPT

## 2024-02-12 ENCOUNTER — TELEPHONE (OUTPATIENT)
Dept: PULMONOLOGY | Facility: CLINIC | Age: 54
End: 2024-02-12
Payer: COMMERCIAL

## 2024-02-12 DIAGNOSIS — F41.9 ANXIETY: ICD-10-CM

## 2024-02-12 DIAGNOSIS — J45.50 SEVERE PERSISTENT ASTHMA, UNSPECIFIED WHETHER COMPLICATED (H): Primary | ICD-10-CM

## 2024-02-12 DIAGNOSIS — R05.1 ACUTE COUGH: ICD-10-CM

## 2024-02-12 RX ORDER — FLUOXETINE 10 MG/1
10 CAPSULE ORAL DAILY
Qty: 90 CAPSULE | Refills: 1 | Status: SHIPPED | OUTPATIENT
Start: 2024-02-12 | End: 2024-02-15

## 2024-02-12 RX ORDER — PREDNISONE 20 MG/1
TABLET ORAL
Qty: 14 TABLET | Refills: 0 | Status: SHIPPED | OUTPATIENT
Start: 2024-02-12 | End: 2024-03-25

## 2024-02-12 NOTE — TELEPHONE ENCOUNTER
[facility] Call Center    Phone Message    May a detailed message be left on voicemail: yes     Reason for Call: Medication Question or concern regarding medication   Prescription Clarification  Name of Medication: prednisone   Prescribing Provider:    Pharmacy:   [facility] - [place], [place] - [address]      What on the order needs clarification?     Pt's daughter requesting rx for pt's cough.     Action Taken: Other: Pulm     Travel Screening: Not Applicable

## 2024-02-15 ENCOUNTER — ANCILLARY PROCEDURE (OUTPATIENT)
Dept: MAMMOGRAPHY | Facility: CLINIC | Age: 54
End: 2024-02-15
Attending: FAMILY MEDICINE
Payer: COMMERCIAL

## 2024-02-15 ENCOUNTER — OFFICE VISIT (OUTPATIENT)
Dept: FAMILY MEDICINE | Facility: CLINIC | Age: 54
End: 2024-02-15
Payer: COMMERCIAL

## 2024-02-15 VITALS
SYSTOLIC BLOOD PRESSURE: 122 MMHG | HEART RATE: 86 BPM | RESPIRATION RATE: 20 BRPM | WEIGHT: 129.8 LBS | TEMPERATURE: 98.4 F | HEIGHT: 61 IN | DIASTOLIC BLOOD PRESSURE: 72 MMHG | BODY MASS INDEX: 24.51 KG/M2 | OXYGEN SATURATION: 98 %

## 2024-02-15 DIAGNOSIS — M54.42 CHRONIC BILATERAL LOW BACK PAIN WITH BILATERAL SCIATICA: ICD-10-CM

## 2024-02-15 DIAGNOSIS — F33.9 RECURRENT MAJOR DEPRESSIVE DISORDER, REMISSION STATUS UNSPECIFIED (H): Chronic | ICD-10-CM

## 2024-02-15 DIAGNOSIS — Z79.899 POLYPHARMACY: ICD-10-CM

## 2024-02-15 DIAGNOSIS — K21.9 GASTROESOPHAGEAL REFLUX DISEASE WITHOUT ESOPHAGITIS: Chronic | ICD-10-CM

## 2024-02-15 DIAGNOSIS — M54.41 CHRONIC BILATERAL LOW BACK PAIN WITH BILATERAL SCIATICA: ICD-10-CM

## 2024-02-15 DIAGNOSIS — I25.83 CORONARY ARTERY DISEASE DUE TO LIPID RICH PLAQUE: ICD-10-CM

## 2024-02-15 DIAGNOSIS — I10 ESSENTIAL HYPERTENSION: ICD-10-CM

## 2024-02-15 DIAGNOSIS — Z00.00 ANNUAL PHYSICAL EXAM: Primary | ICD-10-CM

## 2024-02-15 DIAGNOSIS — Z12.31 VISIT FOR SCREENING MAMMOGRAM: ICD-10-CM

## 2024-02-15 DIAGNOSIS — E11.9 TYPE 2 DIABETES MELLITUS TREATED WITH INSULIN (H): ICD-10-CM

## 2024-02-15 DIAGNOSIS — E78.5 HYPERLIPIDEMIA, UNSPECIFIED HYPERLIPIDEMIA TYPE: Chronic | ICD-10-CM

## 2024-02-15 DIAGNOSIS — D64.9 ANEMIA, UNSPECIFIED TYPE: ICD-10-CM

## 2024-02-15 DIAGNOSIS — I25.10 CORONARY ARTERY DISEASE DUE TO LIPID RICH PLAQUE: ICD-10-CM

## 2024-02-15 DIAGNOSIS — G89.29 CHRONIC NONINTRACTABLE HEADACHE, UNSPECIFIED HEADACHE TYPE: Chronic | ICD-10-CM

## 2024-02-15 DIAGNOSIS — R51.9 CHRONIC NONINTRACTABLE HEADACHE, UNSPECIFIED HEADACHE TYPE: Chronic | ICD-10-CM

## 2024-02-15 DIAGNOSIS — J44.9 CHRONIC OBSTRUCTIVE PULMONARY DISEASE, UNSPECIFIED COPD TYPE (H): Chronic | ICD-10-CM

## 2024-02-15 DIAGNOSIS — G89.29 CHRONIC BILATERAL LOW BACK PAIN WITH BILATERAL SCIATICA: ICD-10-CM

## 2024-02-15 DIAGNOSIS — Z79.4 TYPE 2 DIABETES MELLITUS TREATED WITH INSULIN (H): ICD-10-CM

## 2024-02-15 PROCEDURE — 99396 PREV VISIT EST AGE 40-64: CPT | Performed by: FAMILY MEDICINE

## 2024-02-15 PROCEDURE — 77067 SCR MAMMO BI INCL CAD: CPT | Mod: TC | Performed by: RADIOLOGY

## 2024-02-15 RX ORDER — LIDOCAINE 4 G/G
1 PATCH TOPICAL EVERY 24 HOURS
Qty: 30 PATCH | Refills: 1 | Status: SHIPPED | OUTPATIENT
Start: 2024-02-15

## 2024-02-15 ASSESSMENT — PATIENT HEALTH QUESTIONNAIRE - PHQ9
10. IF YOU CHECKED OFF ANY PROBLEMS, HOW DIFFICULT HAVE THESE PROBLEMS MADE IT FOR YOU TO DO YOUR WORK, TAKE CARE OF THINGS AT HOME, OR GET ALONG WITH OTHER PEOPLE: VERY DIFFICULT
SUM OF ALL RESPONSES TO PHQ QUESTIONS 1-9: 9
SUM OF ALL RESPONSES TO PHQ QUESTIONS 1-9: 9

## 2024-02-15 NOTE — PROGRESS NOTES
Preventive Care Visit  [facility] [place] RYAN Cardoza MD, Family Medicine  Feb 15, 2024       SUBJECTIVE:   Kulwant is a 54 year old, presenting for the following:  The patient is here for physical.  She is here with her daughter-in-law who is her primary caregiver.  Currently using Lantus 44 units daily, NovoLog 15 units before meals.(NovoLog was prescribed to use 20 units twice daily before meals) and metformin 1000 mg twice a day. (Unable to download freestyle today ).  She goes to spine center for back pain.  Complaining of worsening back pain in the past few days.  She is taking gabapentin 600 mg 3 times a day.  Patient feels like she is taking too many medications.  She sees cardiology, pulmonology, allergy, spine specialist, allergy specialist and neurology..        2/15/2024    11:19 AM   Additional Questions   Roomed by dangelo armando MA   Accompanied by daughter in law     Today's PHQ-9 Score:       2/15/2024    11:15 AM   PHQ-9 SCORE   PHQ-9 Total Score MyChart 9 (Mild depression)   PHQ-9 Total Score 9     Social History     Tobacco Use    Smoking status: Former     Packs/day: 1.00     Years: 30.00     Additional pack years: 0.00     Total pack years: 30.00     Types: Cigarettes     Quit date: 2003     Years since quittin.1     Passive exposure: Never    Smokeless tobacco: Never    Tobacco comments:     No passive exposure   Substance Use Topics    Alcohol use: No           2/15/2024    11:18 AM   Alcohol Use   Prescreen: >3 drinks/day or >7 drinks/week? No     Reviewed orders with patient.  Reviewed health maintenance and updated orders accordingly - Yes    Breast Cancer Screening:        10/25/2022    11:01 AM 2/15/2024    11:18 AM   Breast CA Risk Assessment (FHS-7)   Do you have a family history of breast, colon, or ovarian cancer? No / Unknown No / Unknown     Pertinent mammograms are reviewed under the imaging tab.    History of abnormal Pap smear: NO - age 30-65 PAP every 5 years with  "negative HPV co-testing recommended      8/31/2017    12:21 PM 2/19/2014    12:00 AM   PAP / HPV   PAP Negative for squamous intraepithelial lesion or malignancy  Electronically signed by Marlene Yeh CT ([facility]) on 9/13/2017 at  3:55 PM       PAP (Historical)  NIL      Reviewed and updated as needed this visit by clinical staff   Tobacco  Allergies  Meds              Reviewed and updated as needed this visit by Provider                    Review of Systems    Review of Systems  CONSTITUTIONAL: NEGATIVE for fever, chills, change in weight  CV: NEGATIVE for Acute chest pain.     OBJECTIVE:   /72   Pulse 86   Temp 98.4  F (36.9  C) (Oral)   Resp 20   Ht 1.54 m (5' 0.63\")   Wt 58.9 kg (129 lb 12.8 oz)   SpO2 98%   BMI 24.83 kg/m     Estimated body mass index is 24.83 kg/m  as calculated from the following:    Height as of this encounter: 1.54 m (5' 0.63\").    Weight as of this encounter: 58.9 kg (129 lb 12.8 oz).    Physical Exam  Gen - alert, orientated, NAD  Eyes - fundascopic exam limited by the undialated pupil but looks symmetric  ENT - oropharynx clear, TMs clear  Neck - supple, no palpable mass or lymphadenopathy  CV - RRR, no murmur  Resp - lungs CTA  Ab - soft, nontender, no palpable mass or organomegaly   -  Declined. Deferred.   Extrem - warm, no edema  Neuro - CN II-XII intact  Skin - no rash.  No atypical appearing lesions seen.        ASSESSMENT/PLAN:   Annual physical exam    Type 2 diabetes mellitus treated with insulin (H)  A1c 10.9, 3 weeks ago.  No med changes today.    Recurrent major depressive disorder, remission status unspecified (H24)  She is currently taking fluoxetine 10 mg daily.  States she is doing well.  She wants to discontinue medication.  Taper and discontinue fluoxetine.  Instructions given to daughter-in-law.    Essential hypertension  Controlled.    Coronary artery disease due to lipid rich plaque  Managed by cardiology.    Hyperlipidemia, unspecified " hyperlipidemia type  Recheck lipids at next visit.    Gastroesophageal reflux disease without esophagitis  Stable.  Continue omeprazole.    Chronic obstructive pulmonary disease, unspecified COPD type (H)  Managed by pulmonology.    Chronic nonintractable headache, unspecified headache type  Managed by neurology.    Visit for screening mammogram  - MA SCREENING DIGITAL BILAT - Future  (s+30); Future    Chronic bilateral low back pain with bilateral sciatica  She has gabapentin 300 mg and 600 mg tablets.  Called pharmacy to discontinue 300 mg.  She is to continue gabapentin 600 mg prescribed by spine center.  - Lidocaine (LIDOCARE) 4 % Patch; Place 1 patch onto the skin every 24 hours To prevent lidocaine toxicity, patient should be patch free for 12 hrs daily.    Anemia, unspecified type  Last hemoglobin was 12.0, 3 weeks ago.    Polypharmacy  Message sent by [facility] pharmacist to discontinue Flexeril per chart review but patient is still taking it.  Instructed to discontinue Flexeril.  Will taper and discontinue Prozac.  Discontinue gabapentin 300 mg and continue gabapentin 600 mg prescribed by spine center.    Counseling  Reviewed preventive health counseling, as reflected in patient instructions       Healthy diet/nutrition        She reports that she quit smoking about 21 years ago. Her smoking use included cigarettes. She has a 30 pack-year smoking history. She has never been exposed to tobacco smoke. She has never used smokeless tobacco.      This transcription uses voice recognition software, which may contain typographical errors.    Signed Electronically by: Adrien Cardoza MD

## 2024-02-20 ENCOUNTER — TELEPHONE (OUTPATIENT)
Dept: PULMONOLOGY | Facility: CLINIC | Age: 54
End: 2024-02-20
Payer: COMMERCIAL

## 2024-02-20 NOTE — TELEPHONE ENCOUNTER
Prior Authorization Retail Medication Request    Medication/Dose: fluticasone-vilanterol (BREO ELLIPTA) 200-25 MCG/ACT inhaler  Diagnosis and ICD code (if different than what is on RX):    New/renewal/insurance change PA/secondary ins. PA: renewal  Previously Tried and Failed:    Rationale:  stable on this medication , see notes    Insurance   Primary: MED[employer] ACCESS [place]ILITY [place]   Insurance ID:  [id number]     Secondary (if applicable):  Insurance ID:      Pharmacy Information (if different than what is on RX)  Name:  [facility]  Phone:  [phone]  Fax: [phone]    Cover my meds Key : [id number]

## 2024-02-28 NOTE — PROGRESS NOTES
Assessment:     Diagnoses and all orders for this visit:  Chronic bilateral low back pain with bilateral sciatica  -     Physical Therapy  Referral; Future  Bulging lumbar disc  -     Physical Therapy  Referral; Future  Lumbar foraminal stenosis  -     Physical Therapy  Referral; Future  Lumbar radiculopathy  -     Physical Therapy  Referral; Future     Kulwant Amin is a 54 year old y.o. female with past medical history significant for hypertension, asthma exacerbation, depression, type 2 diabetes mellitus, GERD, headache, COPD, nonspecific chest pain, CAD, latent TB, lower GI bleed, generalized headaches who presents today for follow-up regarding:    -Chronic bilateral low back pain with bilateral lumbar radiculopathy.  MRI with L5-S1 degenerative changes with bilateral foraminal stenosis left greater than right.     Plan:     A shared decision making plan was used. The patient's values and choices were respected. Prior medical records were reviewed today. The following represents what was discussed and decided upon by the provider and the patient.        -DIAGNOSTIC TESTS: Images were personally reviewed and interpreted.   --Lumbar spine MRI 2/11/2024 with disc height loss at L5-S1 with mild bilateral foraminal stenosis left greater than right.  --Lumbar spine MRI 6/18/2021 with left disc protrusion with left L5 and S1 compression, moderate facet arthropathy.  L4-5 annular tear, no nerve compression.     -INTERVENTIONS: Discussed with patient that if symptoms are not improving with further physical therapy we could trial a bilateral L5-S1 transforaminal epidural steroid injection to see if we can calm down lumbar radiculopathy symptoms.  She is okay with trialing physical therapy first.    -MEDICATIONS: Advised patient to continue with gabapentin as scheduled on a regular basis, diclofenac gel, acetaminophen and lidocaine as needed.  Discussed side effects of medications and  proper use. Patient verbalized understanding.    -PHYSICAL THERAPY: Referral to physical therapy placed to address home exercises for lumbar core strengthening and nerve glides.  Advised patient to complete at least 2-3 sessions and then continue home exercises on a daily basis thereafter.  Discussed the importance of core strengthening, ROM, stretching exercises with the patient and how each of these entities is important in decreasing pain.  Explained to the patient that the purpose of physical therapy is to teach the patient a home exercise program.  These exercises need to be performed every day in order to decrease pain and prevent future occurrences of pain.        -PATIENT EDUCATION:  Total time of 32 minutes, on the day of service, spent with the patient, reviewing the chart, placing orders, and documenting.     -FOLLOW UP: Follow-up in 8 weeks if symptoms have not improved with physical therapy, sooner if pain is worsening or new symptoms arise.    Advised to contact clinic if symptoms worsen or change.    Subjective:     Kulwant Amin is a 54 year old female who presents today for follow-up regarding chronic midline low back pain that radiates in bilateral lower extremities with numbness and tingling that has been ongoing for many years, slightly worse over the last couple months but better in the last week.  She does report that pain is currently a 7/10, 10 at its worst.  Any type of activity is aggravating, the medications do give her some benefit.  Otherwise denies any lower extremity weakness or episodes of her legs giving out on her.  She does walk with a cane for many years.  Denies any recent trips or falls.  Denies bowel or bladder loss control, denies saddle anesthesia.     was present during entire visit today.   Patient's son Shiv, who does speak fluent English, was present today during entire visit and added to past medical/surgical/family/social history and history of presenting  illness.      -Treatment to Date: No prior spinal surgery or spinal injections  PT home therapy recently for multiple medical concerns.  Physical therapy 2021 [facility] x2 sessions.     -Medications:  Flexeril with benefit  Gabapentin 300 mg, 2 tablets 3 times daily  Cyclobenzaprine  Diclofenac gel    Patient Active Problem List   Diagnosis    Pulmonary nodule, right    Environmental allergies    TB lung, latent    COPD (chronic obstructive pulmonary disease)/Asthma     Essential hypertension    Cataracts, bilateral    Corneal opacity    Irregular astigmatism    Anxiety    Chronic nonintractable headache, unspecified headache type    Coronary artery disease due to lipid rich plaque    Dizziness    Hyperlipidemia    Moderate major depression (H)    Moderate persistent asthma    Unspecified visual loss    GERD (gastroesophageal reflux disease)    Dental caries    H/O arterial ischemic stroke    Constipation    Dysphagia    Chronic abdominal pain    Insomnia    FLACO (obstructive sleep apnea)- mild (AHI 14)    Chest pain    Chest pain, unspecified type    Type 2 diabetes mellitus treated with insulin (H)       Current Outpatient Medications   Medication    acetaminophen (TYLENOL) 500 MG tablet    albuterol (PROAIR HFA/PROVENTIL HFA/VENTOLIN HFA) 108 (90 Base) MCG/ACT inhaler    albuterol (PROVENTIL) (2.5 MG/3ML) 0.083% neb solution    amitriptyline (ELAVIL) 50 MG tablet    ASPIRIN LOW DOSE 81 MG EC tablet    atorvastatin (LIPITOR) 80 MG tablet    benzonatate (TESSALON) 100 MG capsule    colchicine (COLCRYS) 0.6 MG tablet    Continuous Blood Gluc Sensor (FREESTYLE MONICA 2 SENSOR) MISC    diclofenac (VOLTAREN) 1 % topical gel    docusate sodium (COLACE) 100 MG capsule    ferrous sulfate (FEROSUL) 325 (65 Fe) MG tablet    fluticasone-vilanterol (BREO ELLIPTA) 200-25 MCG/ACT inhaler    gabapentin (NEURONTIN) 600 MG tablet    galcanezumab-gnlm (EMGALITY) 120 MG/ML injection    galcanezumab-gnlm (EMGALITY) 120 MG/ML injection     hydrochlorothiazide (MICROZIDE) 12.5 MG capsule    insulin aspart (NOVOLOG PEN) 100 UNIT/ML pen    insulin glargine (LANTUS PEN) 100 UNIT/ML pen    insulin pen needle (32G X 4 MM) 32G X 4 MM miscellaneous    Lidocaine (LIDOCARE) 4 % Patch    loratadine (CLARITIN) 10 MG tablet    meclizine (ANTIVERT) 12.5 MG tablet    metFORMIN (GLUCOPHAGE XR) 500 MG 24 hr tablet    metoprolol tartrate (LOPRESSOR) 25 MG tablet    montelukast (SINGULAIR) 10 MG tablet    omeprazole (PRILOSEC) 40 MG DR capsule    ondansetron (ZOFRAN ODT) 4 MG ODT tab    predniSONE (DELTASONE) 20 MG tablet    sucralfate (CARAFATE) 1 GM/10ML suspension    tiotropium (SPIRIVA RESPIMAT) 2.5 MCG/ACT inhaler    tiZANidine (ZANAFLEX) 2 MG tablet     Current Facility-Administered Medications   Medication    tezepelumab-ekko (TEZSPIRE) injection 210 mg       No Known Allergies    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    Microcytic anemia 11/17/2019    S/P coronary artery stent placement 02/02/2018    TB lung, latent     9 mos INH        Review of Systems  ROS:  Specifically negative for bowel/bladder dysfunction, balance changes, headache, dizziness, foot drop, fevers, chills, appetite changes, nausea/vomiting, unexplained weight loss. Otherwise 13 systems reviewed are negative. Please see the patient's intake questionnaire from today for details.    Reviewed Social, Family, Past Medical and Past Surgical history with patient, no significant  changes noted since prior visit.     Objective:     /80 (BP Location: Right arm, Patient Position: Sitting)   Pulse 85   SpO2 99%     PHYSICAL EXAMINATION:    --CONSTITUTIONAL: Well developed, well nourished, healthy appearing individual.  --PSYCHIATRIC: Appropriate mood and affect. No difficulty interacting due to temper, social withdrawal, or memory issues.  --SKIN: Lumbar region is dry and intact.   --RESPIRATORY: Normal rhythm and effort. No abnormal accessory muscle breathing patterns noted.   --MUSCULOSKELETAL:  Normal lumbar lordosis noted, no lateral shift.  --GROSS MOTOR: Easily arises from a seated position. Gait is non-antalgic  --LUMBAR SPINE:  Inspection reveals no evidence of deformity. Range of motion is not limited in lumbar flexion, extension, lateral rotation. No tenderness to palpation lumbar spine. Straight leg raising is negative to radicular pain. Sciatic notch non-tender.   --LOWER EXTREMITY MOTOR TESTING:  Plantar flexion left 5/5, right 5/5   Dorsiflexion left 5/5, right 5/5   Great toe MTP extension left 5/5, right 5/5  Knee flexion left 5/5, right 5/5  Knee extension left 5/5, right 5/5   Hip flexion left 5/5, right 5/5  Hip abduction left 5/5, right 5/5  Hip adduction left 5/5, right 5/5   --HIPS: Full range of motion bilaterally.   --NEUROLOGIC: Bilateral patellar and achilles reflexes are physiologic and symmetric. Sensation to light touch is intact in the bilateral L4, L5, and S1 dermatomes.    RESULTS:   Imaging: Spine imaging was reviewed today. The images were shown to the patient and the findings were explained using a spine model.      MR Cervical Spine w/o Contrast    Result Date: 2/11/2024  EXAM: MR CERVICAL SPINE W/O CONTRAST LOCATION: [facility] DATE: 2/11/2024 INDICATION: Bilateral leg weakness. Headache. COMPARISON: None. TECHNIQUE: MRI Cervical Spine without IV contrast. FINDINGS: Reversal of the usual cervical lordosis with the apex at C4.  Cervical vertebral body heights are maintained. No abnormal cord signal. No extraspinal abnormality. Craniovertebral junction and C1-C2: Normal. C2-C3: Normal disc height. No herniation. Normal facets. No spinal canal or neural foraminal stenosis. C3-C4: Normal disc height. No herniation. Normal facets. No spinal canal or neural foraminal stenosis. C4-C5: Mild loss of disc space height. Minimal annular interbody spurring. Minimal facet arthropathy. No spinal canal stenosis. Mild left neural foraminal stenosis. No right neural foraminal stenosis. C5-C6: Mild loss of disc space height. Minimal annular interbody spurring. Normal facets. No spinal canal stenosis or neural foraminal narrowing. C6-C7: Normal disc space height. No disc herniation. Normal facets. No spinal canal stenosis or neural foraminal narrowing. C7-T1: Normal disc space height. No disc herniation. Normal facets. No spinal canal stenosis or neural foraminal narrowing.     IMPRESSION: 1.  Mild cervical spondylosis without significant cervical spinal canal narrowing. 2.  Mild left neural foraminal stenosis at C4-C5.     MR Lumbar Spine w/o Contrast    Result Date: 2/11/2024  EXAM: MR LUMBAR SPINE W/O CONTRAST LOCATION: [facility] DATE: 2/11/2024 INDICATION: Low back pain; Neurologic deficit, non traumatic; LE weakness; no increasing persistent or sudden onset LE weakness. COMPARISON: None. TECHNIQUE: Routine Lumbar Spine MRI without IV contrast. FINDINGS: Nomenclature is based on 5 lumbar type vertebral bodies. Lumbar spine lordosis is maintained. Lumbar vertebral body heights are maintained. Normal distal spinal cord and cauda equina with conus medullaris at L2. No extraspinal abnormality. Unremarkable visualized bony pelvis. T12-L1: Normal disc height and signal. No herniation. Normal facets. No spinal canal or neural foraminal stenosis. L1-L2: Normal disc height and signal. No herniation. Normal facets. No spinal canal or  neural foraminal stenosis. L2-L3: Normal disc height and signal. No herniation. Normal facets. No spinal canal or neural foraminal stenosis. L3-L4: Normal disc signal and disc space height. Minimal circumferential disc bulge. Normal facets. No spinal canal stenosis or neural foraminal narrowing. L4-L5: Disc desiccation. Normal disc space height. Minimal circumferential disc bulge. Central annular fissure. Normal facets. No spinal canal stenosis or neural foraminal narrowing. L5-S1: Disc desiccation. Mild loss of disc space height. Minimal annular interbody spurring. Normal facets. No spinal canal stenosis. No right and mild left neural foraminal stenosis.     IMPRESSION: 1.  Moderate lumbar spondylosis at L4-L5 and L5-S1. 2.  No high-grade lumbar spinal canal narrowing. 3.  At L5-S1, there is mild left neural foraminal stenosis.

## 2024-03-01 ENCOUNTER — APPOINTMENT (OUTPATIENT)
Dept: RADIOLOGY | Facility: HOSPITAL | Age: 54
End: 2024-03-01
Attending: EMERGENCY MEDICINE
Payer: COMMERCIAL

## 2024-03-01 ENCOUNTER — HOSPITAL ENCOUNTER (EMERGENCY)
Facility: HOSPITAL | Age: 54
Discharge: HOME | End: 2024-03-01
Attending: EMERGENCY MEDICINE | Admitting: EMERGENCY MEDICINE
Payer: COMMERCIAL

## 2024-03-01 VITALS
SYSTOLIC BLOOD PRESSURE: 120 MMHG | TEMPERATURE: 99.1 F | BODY MASS INDEX: 24.21 KG/M2 | RESPIRATION RATE: 35 BRPM | HEART RATE: 116 BPM | OXYGEN SATURATION: 100 % | DIASTOLIC BLOOD PRESSURE: 72 MMHG | WEIGHT: 126.6 LBS

## 2024-03-01 DIAGNOSIS — R05.1 ACUTE COUGH: ICD-10-CM

## 2024-03-01 DIAGNOSIS — R07.9 CHEST PAIN, UNSPECIFIED TYPE: ICD-10-CM

## 2024-03-01 LAB
ANION GAP SERPL CALCULATED.3IONS-SCNC: 10 MMOL/L (ref 7–15)
APTT PPP: 24 SECONDS (ref 22–38)
BASOPHILS # BLD AUTO: 0 10E3/UL (ref 0–0.2)
BASOPHILS NFR BLD AUTO: 0 %
BUN SERPL-MCNC: 15.4 MG/DL (ref 6–20)
CALCIUM SERPL-MCNC: 8.9 MG/DL (ref 8.6–10)
CHLORIDE SERPL-SCNC: 101 MMOL/L (ref 98–107)
CREAT SERPL-MCNC: 0.57 MG/DL (ref 0.51–0.95)
D DIMER PPP FEU-MCNC: <0.27 UG/ML FEU (ref 0–0.5)
DEPRECATED HCO3 PLAS-SCNC: 29 MMOL/L (ref 22–29)
EGFRCR SERPLBLD CKD-EPI 2021: >90 ML/MIN/1.73M2
EOSINOPHIL # BLD AUTO: 0.2 10E3/UL (ref 0–0.7)
EOSINOPHIL NFR BLD AUTO: 2 %
ERYTHROCYTE [DISTWIDTH] IN BLOOD BY AUTOMATED COUNT: 18.5 % (ref 10–15)
FLUAV RNA SPEC QL NAA+PROBE: NEGATIVE
FLUBV RNA RESP QL NAA+PROBE: NEGATIVE
GLUCOSE SERPL-MCNC: 188 MG/DL (ref 70–99)
HCT VFR BLD AUTO: 44.3 % (ref 35–47)
HGB BLD-MCNC: 13.8 G/DL (ref 11.7–15.7)
HOLD SPECIMEN: NORMAL
HOLD SPECIMEN: NORMAL
IMM GRANULOCYTES # BLD: 0.1 10E3/UL
IMM GRANULOCYTES NFR BLD: 1 %
INR PPP: 0.97 (ref 0.85–1.15)
LACTATE SERPL-SCNC: 1.7 MMOL/L (ref 0.7–2)
LACTATE SERPL-SCNC: 2.3 MMOL/L (ref 0.7–2)
LYMPHOCYTES # BLD AUTO: 1.7 10E3/UL (ref 0.8–5.3)
LYMPHOCYTES NFR BLD AUTO: 11 %
MCH RBC QN AUTO: 24.6 PG (ref 26.5–33)
MCHC RBC AUTO-ENTMCNC: 31.2 G/DL (ref 31.5–36.5)
MCV RBC AUTO: 79 FL (ref 78–100)
MONOCYTES # BLD AUTO: 0.8 10E3/UL (ref 0–1.3)
MONOCYTES NFR BLD AUTO: 5 %
NEUTROPHILS # BLD AUTO: 12.4 10E3/UL (ref 1.6–8.3)
NEUTROPHILS NFR BLD AUTO: 81 %
NRBC # BLD AUTO: 0 10E3/UL
NRBC BLD AUTO-RTO: 0 /100
NT-PROBNP SERPL-MCNC: 43 PG/ML (ref 0–900)
PLATELET # BLD AUTO: 221 10E3/UL (ref 150–450)
POTASSIUM SERPL-SCNC: 3.4 MMOL/L (ref 3.4–5.3)
RBC # BLD AUTO: 5.62 10E6/UL (ref 3.8–5.2)
RSV RNA SPEC NAA+PROBE: NEGATIVE
SARS-COV-2 RNA RESP QL NAA+PROBE: NEGATIVE
SODIUM SERPL-SCNC: 140 MMOL/L (ref 135–145)
TROPONIN T SERPL HS-MCNC: <6 NG/L
WBC # BLD AUTO: 15.1 10E3/UL (ref 4–11)

## 2024-03-01 PROCEDURE — 96361 HYDRATE IV INFUSION ADD-ON: CPT

## 2024-03-01 PROCEDURE — 99285 EMERGENCY DEPT VISIT HI MDM: CPT | Mod: 25

## 2024-03-01 PROCEDURE — 36415 COLL VENOUS BLD VENIPUNCTURE: CPT | Performed by: EMERGENCY MEDICINE

## 2024-03-01 PROCEDURE — 85610 PROTHROMBIN TIME: CPT | Performed by: EMERGENCY MEDICINE

## 2024-03-01 PROCEDURE — 85025 COMPLETE CBC W/AUTO DIFF WBC: CPT | Performed by: EMERGENCY MEDICINE

## 2024-03-01 PROCEDURE — 71046 X-RAY EXAM CHEST 2 VIEWS: CPT

## 2024-03-01 PROCEDURE — 83880 ASSAY OF NATRIURETIC PEPTIDE: CPT | Performed by: EMERGENCY MEDICINE

## 2024-03-01 PROCEDURE — 84484 ASSAY OF TROPONIN QUANT: CPT | Performed by: EMERGENCY MEDICINE

## 2024-03-01 PROCEDURE — 93005 ELECTROCARDIOGRAM TRACING: CPT | Performed by: EMERGENCY MEDICINE

## 2024-03-01 PROCEDURE — 96360 HYDRATION IV INFUSION INIT: CPT

## 2024-03-01 PROCEDURE — 87637 SARSCOV2&INF A&B&RSV AMP PRB: CPT | Performed by: EMERGENCY MEDICINE

## 2024-03-01 PROCEDURE — 258N000003 HC RX IP 258 OP 636: Performed by: EMERGENCY MEDICINE

## 2024-03-01 PROCEDURE — 85730 THROMBOPLASTIN TIME PARTIAL: CPT | Performed by: EMERGENCY MEDICINE

## 2024-03-01 PROCEDURE — 83605 ASSAY OF LACTIC ACID: CPT | Performed by: EMERGENCY MEDICINE

## 2024-03-01 PROCEDURE — 80048 BASIC METABOLIC PNL TOTAL CA: CPT | Performed by: EMERGENCY MEDICINE

## 2024-03-01 PROCEDURE — 85379 FIBRIN DEGRADATION QUANT: CPT | Performed by: EMERGENCY MEDICINE

## 2024-03-01 RX ORDER — BENZONATATE 100 MG/1
100 CAPSULE ORAL 3 TIMES DAILY PRN
Qty: 21 CAPSULE | Refills: 0 | Status: SHIPPED | OUTPATIENT
Start: 2024-03-01 | End: 2024-03-25

## 2024-03-01 RX ORDER — ONDANSETRON 4 MG/1
4-8 TABLET, ORALLY DISINTEGRATING ORAL EVERY 8 HOURS PRN
Qty: 20 TABLET | Refills: 0 | Status: SHIPPED | OUTPATIENT
Start: 2024-03-01 | End: 2024-03-25

## 2024-03-01 RX ADMIN — SODIUM CHLORIDE 500 ML: 9 INJECTION, SOLUTION INTRAVENOUS at 08:30

## 2024-03-01 RX ADMIN — SODIUM CHLORIDE 500 ML: 9 INJECTION, SOLUTION INTRAVENOUS at 11:00

## 2024-03-01 ASSESSMENT — ACTIVITIES OF DAILY LIVING (ADL)
ADLS_ACUITY_SCORE: 38

## 2024-03-01 ASSESSMENT — COLUMBIA-SUICIDE SEVERITY RATING SCALE - C-SSRS
2. HAVE YOU ACTUALLY HAD ANY THOUGHTS OF KILLING YOURSELF IN THE PAST MONTH?: NO
1. IN THE PAST MONTH, HAVE YOU WISHED YOU WERE DEAD OR WISHED YOU COULD GO TO SLEEP AND NOT WAKE UP?: NO
6. HAVE YOU EVER DONE ANYTHING, STARTED TO DO ANYTHING, OR PREPARED TO DO ANYTHING TO END YOUR LIFE?: NO

## 2024-03-01 NOTE — DISCHARGE INSTRUCTIONS
All of the tests looking at the heart or looking for something like a bacterial pneumonia, COVID, influenza are negative.  Most likely there is some sort of virus that is causing the cough and made you start to have the chest discomfort.  You can take the cough medicine prescribed to see if it helps your symptoms.

## 2024-03-01 NOTE — ED PROVIDER NOTES
EMERGENCY DEPARTMENT ENCOUNTER     NAME: Kulwant Amin   AGE: 54 year old female   YOB: 1970   MRN: [id number]   EVALUATION DATE & TIME: 3/1/2024  8:11 AM   PCP: Adrien Cardoza     Chief Complaint   Patient presents with    Chest Pain    Cough   :    FINAL IMPRESSION       1. Acute cough    2. Chest pain, unspecified type           ED COURSE & MEDICAL DECISION MAKING      Pertinent Labs & Imaging studies reviewed. (See chart for details)   54 year old female  presents to the Emergency Department for evaluation of 2 days of cough, then having chest discomfort with coughing today. Initial Vitals Reviewed. Initial exam notable for currently well-appearing patient who was initially tachycardic but with no hypoxia, clear lungs, no increased work of breathing.  Based on the description it does sound like she has some sort of an illness whether it be viral, pneumonia, or less likely something like pulmonary edema that has led to some musculoskeletal chest discomfort with coughing.  I did initially consider ACS and troponin is unremarkable and with greater than a day of ongoing symptoms I feel this rules it out.  D-dimer is negative.  Chest x-ray does not show pneumonia.  She does have a mild leukocytosis, and testing is also negative for COVID, influenza, RSV.  She has not had any hypoxia here or suggestion that she would require admission.  EKG is nonischemic.  Ultimately, at this time I think there is a viral process causing musculoskeletal chest discomfort and I am going to discharge with supportive care, Tessalon prescription, and return precautions.  Additionally, it was discovered that multiple family members are sick with the same viral syndrome, summary been being seen here at the emergency department and I think this fits with the clinical picture.  They also asked for prescription for Zofran which was written prior to discharge.           At the conclusion of the encounter I discussed the results of all  of the tests and the disposition. The questions were answered. The patient or family acknowledged understanding and was agreeable with the care plan.     0 minutes critical care time, see procedure note below for details if relevant    Medical Decision Making    History:  Supplemental history from: Family Member/Significant Other  External Record(s) reviewed: Outpatient Record: ED visit for chest pain 1/8/2024 and 1/10/2024    Work Up:  Chart documentation includes differential considered and any EKGs or imaging independently interpreted by provider, where specified.  In additional to work up documented, I considered the following work up: Documented in chart, if applicable.    External consultation:  Discussion of management with another provider: Documented in chart, if applicable    Complicating factors:  Care impacted by chronic illness: Chronic Lung Disease  Care affected by social determinants of health: Access to Medical Care    Disposition considerations: Discharge. I prescribed additional prescription strength medication(s) as charted. I considered admission, but ultimately discharged patient with reassuring workup.        MEDICATIONS GIVEN IN THE EMERGENCY:   Medications   sodium chloride 0.9% BOLUS 500 mL (0 mLs Intravenous Stopped 3/1/24 0946)   sodium chloride 0.9% BOLUS 500 mL (500 mLs Intravenous $New Bag 3/1/24 1100)      NEW PRESCRIPTIONS STARTED AT TODAY'S ER VISIT   New Prescriptions    BENZONATATE (TESSALON) 100 MG CAPSULE    Take 1 capsule (100 mg) by mouth 3 times daily as needed for cough     ================================================================   HISTORY OF PRESENT ILLNESS       Patient information was obtained from: family member   Use of Intrepreter: N/A   Kulwant Amin is a 54 year old female with history of asthma and COPD who presents for evaluation of 1 day of cough and chest pain.  Family members note that she started with a cough, chills, and feeling generally unwell.  She  has not had a fever that they know of.  After coughing, she noticed some chest discomfort in the front of her chest.  No sore throat, runny nose.  No known sick contacts.    ================================================================        PAST HISTORY     PAST MEDICAL HISTORY:   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    Microcytic anemia 11/17/2019    S/P coronary artery stent placement 02/02/2018    TB lung, latent     9 mos INH      PAST SURGICAL HISTORY:   Past Surgical History:   Procedure Laterality Date    CORONARY STENT PLACEMENT  2018    CV CORONARY ANGIOGRAM N/A 1/25/2018    Procedure: Coronary Angiogram;  Surgeon: Angelo Serrano MD;  Location: [facility] Cath Lab;  Service:     CV CORONARY ANGIOGRAM N/A 5/5/2022    Procedure: Coronary Angiogram;  Surgeon: Irwin Cano MD;  Location: [facility] CATH LAB CV    CV CORONARY ANGIOGRAM  5/5/2022    Procedure: ;  Surgeon: Irwin Cano MD;  Location: [facility] LAB CV    MA ESOPHAGOGASTRODUODENOSCOPY TRANSORAL DIAGNOSTIC N/A 12/10/2018    Procedure: ESOPHAGOGASTRODUODENOSCOPY (EGD);  Surgeon: Eddie Renteria MD;  Location: [facility];  Service: Gastroenterology    MA ESOPHAGOGASTRODUODENOSCOPY TRANSORAL DIAGNOSTIC N/A 12/3/2020    Procedure: ESOPHAGOGASTRODUODENOSCOPY (EGD) with biospies ;  Surgeon: Avi Crow MD;  Location: [facility];  Service: Gastroenterology    [facility] COLONOSCOPY W/WO BRUSH/WASH N/A  12/10/2018    Procedure: COLONOSCOPY with polypectomy using biopsy forceps;  Surgeon: Eddie Renteria MD;  Location: [facility];  Service: Gastroenterology      CURRENT MEDICATIONS:   acetaminophen (TYLENOL) 500 MG tablet  albuterol (PROAIR HFA/PROVENTIL HFA/VENTOLIN HFA) 108 (90 Base) MCG/ACT inhaler  albuterol (PROVENTIL) (2.5 MG/3ML) 0.083% neb solution  amitriptyline (ELAVIL) 50 MG tablet  ASPIRIN LOW DOSE 81 MG EC tablet  atorvastatin (LIPITOR) 80 MG tablet  colchicine (COLCRYS) 0.6 MG tablet  Continuous Blood Gluc Sensor (FREESTYLE MONICA 2 SENSOR) MISC  diclofenac (VOLTAREN) 1 % topical gel  docusate sodium (COLACE) 100 MG capsule  ferrous sulfate (FEROSUL) 325 (65 Fe) MG tablet  fluticasone-vilanterol (BREO ELLIPTA) 200-25 MCG/ACT inhaler  gabapentin (NEURONTIN) 600 MG tablet  galcanezumab-gnlm (EMGALITY) 120 MG/ML injection  galcanezumab-gnlm (EMGALITY) 120 MG/ML injection  hydrochlorothiazide (MICROZIDE) 12.5 MG capsule  insulin aspart (NOVOLOG PEN) 100 UNIT/ML pen  insulin glargine (LANTUS PEN) 100 UNIT/ML pen  insulin pen needle (32G X 4 MM) 32G X 4 MM miscellaneous  Lidocaine (LIDOCARE) 4 % Patch  loratadine (CLARITIN) 10 MG tablet  meclizine (ANTIVERT) 12.5 MG tablet  metFORMIN (GLUCOPHAGE XR) 500 MG 24 hr tablet  metoprolol tartrate (LOPRESSOR) 25 MG tablet  montelukast (SINGULAIR) 10 MG tablet  omeprazole (PRILOSEC) 40 MG DR capsule  predniSONE (DELTASONE) 20 MG tablet  sucralfate (CARAFATE) 1 GM/10ML suspension  tiotropium (SPIRIVA RESPIMAT) 2.5 MCG/ACT inhaler  tiZANidine (ZANAFLEX) 2 MG tablet      ALLERGIES:   No Known Allergies   FAMILY HISTORY:   Family History   Problem Relation Age of Onset    Other - See Comments Mother          of an intestinal problem    Ulcers Father          of gastritis    Breast Cancer No family hx of     Ovarian Cancer No family hx of     Colon Cancer No family hx of       SOCIAL HISTORY:   Social History     Socioeconomic History    Marital status:     Tobacco Use    Smoking status: Former     Packs/day: 1.00     Years: 30.00     Additional pack years: 0.00     Total pack years: 30.00     Types: Cigarettes     Quit date: 2003     Years since quittin.1     Passive exposure: Never    Smokeless tobacco: Never    Tobacco comments:     No passive exposure   Vaping Use    Vaping Use: Never used   Substance and Sexual Activity    Alcohol use: No    Drug use: No    Sexual activity: Yes     Partners: Male   Social History Narrative    2017 The patient lives with her daughter-in-law (who is present), , son, and 2 grandchildren (total of 6 people). Immigrant.     Social Determinants of Health     Financial Resource Strain: Low Risk  (2/15/2024)    Financial Resource Strain     Within the past 12 months, have you or your family members you live with been unable to get utilities (heat, electricity) when it was really needed?: No   Food Insecurity: Low Risk  (2/15/2024)    Food Insecurity     Within the past 12 months, did you worry that your food would run out before you got money to buy more?: No     Within the past 12 months, did the food you bought just not last and you didn t have money to get more?: No   Transportation Needs: Low Risk  (2/15/2024)    Transportation Needs     Within the past 12 months, has lack of transportation kept you from medical appointments, getting your medicines, non-medical meetings or appointments, work, or from getting things that you need?: No   Housing Stability: Low Risk  (2/15/2024)    Housing Stability     Do you have housing? : Yes     Are you worried about losing your housing?: No        VITALS  Patient Vitals for the past 24 hrs:   BP Temp Temp src Pulse Resp SpO2 Weight   24 0814 -- -- -- -- -- -- 57.4 kg (126 lb 9.6 oz)   24 0808 (!) 127/93 99.1  F (37.3  C) Oral 116 22 99 % --        ================================================================    PHYSICAL EXAM     VITAL SIGNS: BP (!) 127/93    Pulse 116   Temp 99.1  F (37.3  C) (Oral)   Resp 22   Wt 57.4 kg (126 lb 9.6 oz)   SpO2 99%   BMI 24.21 kg/m     Constitutional:  Awake, no acute distress   HENT:  Atraumatic, oropharynx without exudate or erythema, membranes moist  Lymph:  No adenopathy  Eyes: EOM intact, PERRL, no injection  Neck: Supple  Respiratory:  Clear to auscultation bilaterally, no wheezes or crackles   Cardiovascular:  tachycardic, Regular  rhythm, single S1 and S2   GI:  Soft, nontender, nondistended, no rebound or guarding   Musculoskeletal:  Moves all extremities, no lower extremity edema, no deformities    Skin:  Warm, dry  Neurologic:  Alert and oriented x3, no focal deficits noted       ================================================================  LAB       All pertinent labs reviewed and interpreted.   Labs Ordered and Resulted from Time of ED Arrival to Time of ED Departure   BASIC METABOLIC PANEL - Abnormal       Result Value    Sodium 140      Potassium 3.4      Chloride 101      Carbon Dioxide (CO2) 29      Anion Gap 10      Urea Nitrogen 15.4      Creatinine 0.57      GFR Estimate >90      Calcium 8.9      Glucose 188 (*)    CBC WITH PLATELETS AND DIFFERENTIAL - Abnormal    WBC Count 15.1 (*)     RBC Count 5.62 (*)     Hemoglobin 13.8      Hematocrit 44.3      MCV 79      MCH 24.6 (*)     MCHC 31.2 (*)     RDW 18.5 (*)     Platelet Count 221      % Neutrophils 81      % Lymphocytes 11      % Monocytes 5      % Eosinophils 2      % Basophils 0      % Immature Granulocytes 1      NRBCs per 100 WBC 0      Absolute Neutrophils 12.4 (*)     Absolute Lymphocytes 1.7      Absolute Monocytes 0.8      Absolute Eosinophils 0.2      Absolute Basophils 0.0      Absolute Immature Granulocytes 0.1      Absolute NRBCs 0.0     LACTIC ACID WHOLE BLOOD - Abnormal    Lactic Acid 2.3 (*)    D DIMER QUANTITATIVE - Normal    D-Dimer Quantitative <0.27     INR - Normal    INR 0.97     PARTIAL THROMBOPLASTIN TIME - Normal    aPTT 24      TROPONIN T, HIGH SENSITIVITY - Normal    Troponin T, High Sensitivity <6     NT PROBNP INPATIENT - Normal    N terminal Pro BNP Inpatient 43     INFLUENZA A/B, RSV, & SARS-COV2 PCR - Normal    Influenza A PCR Negative      Influenza B PCR Negative      RSV PCR Negative      SARS CoV2 PCR Negative     LACTIC ACID WHOLE BLOOD - Normal    Lactic Acid 1.7     LACTIC ACID WHOLE BLOOD        ===============================================================  RADIOLOGY       Reviewed all pertinent imaging. Please see official radiology report.   Chest XR,  PA & LAT   Final Result   IMPRESSION: No focal airspace opacity. Similar peripheral reticulation in the right lower lung. No pleural effusion or pneumothorax. Cardiac silhouette and mediastinal contours are stable. Coronary stent.            ================================================================  EKG     EKG reviewed interpreted by me shows sinus tachycardia with a rate of 114, normal axis, QTc 471 with no acute ST or T wave changes since January 10    I have independently reviewed and interpreted the EKG(s) documented above.     ================================================================  PROCEDURES           Carline Ivan M.D.   Emergency Medicine   [facility] EMERGENCY DEPARTMENT  [facility] [facility] [id number] [phone]  Dept: [phone]        Carline Ivan MD  03/01/24 5092       Carline Ivan MD  03/01/24 0847

## 2024-03-01 NOTE — ED TRIAGE NOTES
Patient arrives from home with family member, states has been having cough and chest pain since yesterday. Generalized chest pain, worse with cough. Reports nausea with vomiting this morning.     Triage Assessment (Adult)       Row Name 03/01/24 0809          Triage Assessment    Airway WDL WDL        Respiratory WDL    Respiratory WDL X;cough     Cough Frequency frequent     Cough Type dry        Skin Circulation/Temperature WDL    Skin Circulation/Temperature WDL WDL        Cardiac WDL    Cardiac WDL X;chest pain        Chest Pain Assessment    Character other (see comments)  worse with cough     Duration yesterday

## 2024-03-02 LAB
ATRIAL RATE - MUSE: 114 BPM
DIASTOLIC BLOOD PRESSURE - MUSE: NORMAL MMHG
INTERPRETATION ECG - MUSE: NORMAL
P AXIS - MUSE: 49 DEGREES
PR INTERVAL - MUSE: 144 MS
QRS DURATION - MUSE: 86 MS
QT - MUSE: 342 MS
QTC - MUSE: 471 MS
R AXIS - MUSE: -60 DEGREES
SYSTOLIC BLOOD PRESSURE - MUSE: NORMAL MMHG
T AXIS - MUSE: 45 DEGREES
VENTRICULAR RATE- MUSE: 114 BPM

## 2024-03-04 ENCOUNTER — OFFICE VISIT (OUTPATIENT)
Dept: PHYSICAL MEDICINE AND REHAB | Facility: CLINIC | Age: 54
End: 2024-03-04
Payer: COMMERCIAL

## 2024-03-04 VITALS — OXYGEN SATURATION: 99 % | HEART RATE: 85 BPM | SYSTOLIC BLOOD PRESSURE: 124 MMHG | DIASTOLIC BLOOD PRESSURE: 80 MMHG

## 2024-03-04 DIAGNOSIS — G89.29 CHRONIC BILATERAL LOW BACK PAIN WITH BILATERAL SCIATICA: Primary | ICD-10-CM

## 2024-03-04 DIAGNOSIS — M54.41 CHRONIC BILATERAL LOW BACK PAIN WITH BILATERAL SCIATICA: Primary | ICD-10-CM

## 2024-03-04 DIAGNOSIS — M54.42 CHRONIC BILATERAL LOW BACK PAIN WITH BILATERAL SCIATICA: Primary | ICD-10-CM

## 2024-03-04 DIAGNOSIS — M48.061 LUMBAR FORAMINAL STENOSIS: ICD-10-CM

## 2024-03-04 DIAGNOSIS — M54.16 LUMBAR RADICULOPATHY: ICD-10-CM

## 2024-03-04 DIAGNOSIS — M51.369 BULGING LUMBAR DISC: ICD-10-CM

## 2024-03-04 PROCEDURE — 99214 OFFICE O/P EST MOD 30 MIN: CPT | Performed by: NURSE PRACTITIONER

## 2024-03-04 ASSESSMENT — PAIN SCALES - GENERAL: PAINLEVEL: SEVERE PAIN (7)

## 2024-03-04 NOTE — PATIENT INSTRUCTIONS
~Spine Center Scheduling #[phone].  ~Please call our [facility] Spine Nurse Navigation #[phone] with any questions or concerns about your treatment plan, if symptoms worsen and you would like to be seen urgently, or if you have problems controlling bladder and bowel function.  ~For any future flareups or new symptoms, recommend follow-up in clinic or contact the nurse navigator line.  ~Please note that any My Chart messages may take multiple days for a response due to the high volume of patients seen in clinic.  Anything sent Thursday night or after will be answered the following week when able, as Daily Goldberg CNP does not work in clinic on Fridays.   ~Daily Goldberg CNP is at the [facility] on the first and third Tuesdays of the month only, otherwise primarily at the [facility].        ~You have been referred for Physical Therapy to [facility]. They will call you to schedule an appointment.      Scheduling phone number is [phone] [facility] [place], [place], or [place] location.  If you have not heard from the scheduling office within 2 business days, please call [phone] for ALL other locations.    Discussed the importance of core strengthening, ROM, stretching exercises and how each of these entities is important in decreasing pain and improving long term spine health.  The purpose of physical therapy is to teach you an individualized home exercise program.  These exercises need to be performed every day in order to decrease pain and prevent future occurrences of pain.

## 2024-03-04 NOTE — LETTER
3/4/2024         RE: Kulwant Amin  [address] [facility] [address]        Dear Colleague,    Thank you for referring your patient, Kulwant Amin, to the [facility]. Please see a copy of my visit note below.      Assessment:     Diagnoses and all orders for this visit:  Chronic bilateral low back pain with bilateral sciatica  -     Physical Therapy  Referral; Future  Bulging lumbar disc  -     Physical Therapy  Referral; Future  Lumbar foraminal stenosis  -     Physical Therapy  Referral; Future  Lumbar radiculopathy  -     Physical Therapy  Referral; Future     Kulwant Amin is a 54 year old y.o. female with past medical history significant for hypertension, asthma exacerbation, depression, type 2 diabetes mellitus, GERD, headache, COPD, nonspecific chest pain, CAD, latent TB, lower GI bleed, generalized headaches who presents today for follow-up regarding:    -Chronic bilateral low back pain with bilateral lumbar radiculopathy.  MRI with L5-S1 degenerative changes with bilateral foraminal stenosis left greater than right.     Plan:     A shared decision making plan was used. The patient's values and choices were respected. Prior medical records were reviewed today. The following represents what was discussed and decided upon by the provider and the patient.        -DIAGNOSTIC TESTS: Images were personally reviewed and interpreted.   --Lumbar spine MRI 2/11/2024 with disc height loss at L5-S1 with mild bilateral foraminal stenosis left greater than right.  --Lumbar spine MRI 6/18/2021 with left disc protrusion with left L5 and S1 compression, moderate facet arthropathy.  L4-5 annular tear, no nerve compression.     -INTERVENTIONS: Discussed with patient that if symptoms are not improving with further physical therapy we could trial a bilateral L5-S1 transforaminal epidural steroid injection to see if we can calm down lumbar radiculopathy  symptoms.  She is okay with trialing physical therapy first.    -MEDICATIONS: Advised patient to continue with gabapentin as scheduled on a regular basis, diclofenac gel, acetaminophen and lidocaine as needed.  Discussed side effects of medications and proper use. Patient verbalized understanding.    -PHYSICAL THERAPY: Referral to physical therapy placed to address home exercises for lumbar core strengthening and nerve glides.  Advised patient to complete at least 2-3 sessions and then continue home exercises on a daily basis thereafter.  Discussed the importance of core strengthening, ROM, stretching exercises with the patient and how each of these entities is important in decreasing pain.  Explained to the patient that the purpose of physical therapy is to teach the patient a home exercise program.  These exercises need to be performed every day in order to decrease pain and prevent future occurrences of pain.        -PATIENT EDUCATION:  Total time of 32 minutes, on the day of service, spent with the patient, reviewing the chart, placing orders, and documenting.     -FOLLOW UP: Follow-up in 8 weeks if symptoms have not improved with physical therapy, sooner if pain is worsening or new symptoms arise.    Advised to contact clinic if symptoms worsen or change.    Subjective:     Kulwant Amin is a 54 year old female who presents today for follow-up regarding chronic midline low back pain that radiates in bilateral lower extremities with numbness and tingling that has been ongoing for many years, slightly worse over the last couple months but better in the last week.  She does report that pain is currently a 7/10, 10 at its worst.  Any type of activity is aggravating, the medications do give her some benefit.  Otherwise denies any lower extremity weakness or episodes of her legs giving out on her.  She does walk with a cane for many years.  Denies any recent trips or falls.  Denies bowel or bladder loss control,  denies saddle anesthesia.     was present during entire visit today.   Patient's son Shiv, who does speak fluent English, was present today during entire visit and added to past medical/surgical/family/social history and history of presenting illness.      -Treatment to Date: No prior spinal surgery or spinal injections  PT home therapy recently for multiple medical concerns.  Physical therapy 2021 LBP x2 sessions.     -Medications:  Flexeril with benefit  Gabapentin 300 mg, 2 tablets 3 times daily  Cyclobenzaprine  Diclofenac gel    Patient Active Problem List   Diagnosis     Pulmonary nodule, right     Environmental allergies     TB lung, latent     COPD (chronic obstructive pulmonary disease)/Asthma      Essential hypertension     Cataracts, bilateral     Corneal opacity     Irregular astigmatism     Anxiety     Chronic nonintractable headache, unspecified headache type     Coronary artery disease due to lipid rich plaque     Dizziness     Hyperlipidemia     Moderate major depression (H)     Moderate persistent asthma     Unspecified visual loss     GERD (gastroesophageal reflux disease)     Dental caries     H/O arterial ischemic stroke     Constipation     Dysphagia     Chronic abdominal pain     Insomnia     FLACO (obstructive sleep apnea)- mild (AHI 14)     Chest pain     Chest pain, unspecified type     Type 2 diabetes mellitus treated with insulin (H)       Current Outpatient Medications   Medication     acetaminophen (TYLENOL) 500 MG tablet     albuterol (PROAIR HFA/PROVENTIL HFA/VENTOLIN HFA) 108 (90 Base) MCG/ACT inhaler     albuterol (PROVENTIL) (2.5 MG/3ML) 0.083% neb solution     amitriptyline (ELAVIL) 50 MG tablet     ASPIRIN LOW DOSE 81 MG EC tablet     atorvastatin (LIPITOR) 80 MG tablet     benzonatate (TESSALON) 100 MG capsule     colchicine (COLCRYS) 0.6 MG tablet     Continuous Blood Gluc Sensor (FREESTYLE MONICA 2 SENSOR) MISC     diclofenac (VOLTAREN) 1 % topical gel     docusate  sodium (COLACE) 100 MG capsule     ferrous sulfate (FEROSUL) 325 (65 Fe) MG tablet     fluticasone-vilanterol (BREO ELLIPTA) 200-25 MCG/ACT inhaler     gabapentin (NEURONTIN) 600 MG tablet     galcanezumab-gnlm (EMGALITY) 120 MG/ML injection     galcanezumab-gnlm (EMGALITY) 120 MG/ML injection     hydrochlorothiazide (MICROZIDE) 12.5 MG capsule     insulin aspart (NOVOLOG PEN) 100 UNIT/ML pen     insulin glargine (LANTUS PEN) 100 UNIT/ML pen     insulin pen needle (32G X 4 MM) 32G X 4 MM miscellaneous     Lidocaine (LIDOCARE) 4 % Patch     loratadine (CLARITIN) 10 MG tablet     meclizine (ANTIVERT) 12.5 MG tablet     metFORMIN (GLUCOPHAGE XR) 500 MG 24 hr tablet     metoprolol tartrate (LOPRESSOR) 25 MG tablet     montelukast (SINGULAIR) 10 MG tablet     omeprazole (PRILOSEC) 40 MG DR capsule     ondansetron (ZOFRAN ODT) 4 MG ODT tab     predniSONE (DELTASONE) 20 MG tablet     sucralfate (CARAFATE) 1 GM/10ML suspension     tiotropium (SPIRIVA RESPIMAT) 2.5 MCG/ACT inhaler     tiZANidine (ZANAFLEX) 2 MG tablet     Current Facility-Administered Medications   Medication     tezepelumab-ekko (TEZSPIRE) injection 210 mg       No Known Allergies    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     Microcytic anemia 11/17/2019     S/P coronary artery stent placement 02/02/2018     TB lung, latent     9 mos INH        Review of Systems  ROS:  Specifically negative for bowel/bladder dysfunction, balance  changes, headache, dizziness, foot drop, fevers, chills, appetite changes, nausea/vomiting, unexplained weight loss. Otherwise 13 systems reviewed are negative. Please see the patient's intake questionnaire from today for details.    Reviewed Social, Family, Past Medical and Past Surgical history with patient, no significant changes noted since prior visit.     Objective:     /80 (BP Location: Right arm, Patient Position: Sitting)   Pulse 85   SpO2 99%     PHYSICAL EXAMINATION:    --CONSTITUTIONAL: Well developed, well nourished, healthy appearing individual.  --PSYCHIATRIC: Appropriate mood and affect. No difficulty interacting due to temper, social withdrawal, or memory issues.  --SKIN: Lumbar region is dry and intact.   --RESPIRATORY: Normal rhythm and effort. No abnormal accessory muscle breathing patterns noted.   --MUSCULOSKELETAL:  Normal lumbar lordosis noted, no lateral shift.  --GROSS MOTOR: Easily arises from a seated position. Gait is non-antalgic  --LUMBAR SPINE:  Inspection reveals no evidence of deformity. Range of motion is not limited in lumbar flexion, extension, lateral rotation. No tenderness to palpation lumbar spine. Straight leg raising is negative to radicular pain. Sciatic notch non-tender.   --LOWER EXTREMITY MOTOR TESTING:  Plantar flexion left 5/5, right 5/5   Dorsiflexion left 5/5, right 5/5   Great toe MTP extension left 5/5, right 5/5  Knee flexion left 5/5, right 5/5  Knee extension left 5/5, right 5/5   Hip flexion left 5/5, right 5/5  Hip abduction left 5/5, right 5/5  Hip adduction left 5/5, right 5/5   --HIPS: Full range of motion bilaterally.   --NEUROLOGIC: Bilateral patellar and achilles reflexes are physiologic and symmetric. Sensation to light touch is intact in the bilateral L4, L5, and S1 dermatomes.    RESULTS:   Imaging: Spine imaging was reviewed today. The images were shown to the patient and the findings were explained using a spine model.      MR Cervical  Spine w/o Contrast    Result Date: 2/11/2024  EXAM: MR CERVICAL SPINE W/O CONTRAST LOCATION: [facility] DATE: 2/11/2024 INDICATION: Bilateral leg weakness. Headache. COMPARISON: None. TECHNIQUE: MRI Cervical Spine without IV contrast. FINDINGS: Reversal of the usual cervical lordosis with the apex at C4. Cervical vertebral body heights are maintained. No abnormal cord signal. No extraspinal abnormality. Craniovertebral junction and C1-C2: Normal. C2-C3: Normal disc height. No herniation. Normal facets. No spinal canal or neural foraminal stenosis. C3-C4: Normal disc height. No herniation. Normal facets. No spinal canal or neural foraminal stenosis. C4-C5: Mild loss of disc space height. Minimal annular interbody spurring. Minimal facet arthropathy. No spinal canal stenosis. Mild left neural foraminal stenosis. No right neural foraminal stenosis. C5-C6: Mild loss of disc space height. Minimal annular interbody spurring. Normal facets. No spinal canal stenosis or neural foraminal narrowing. C6-C7: Normal disc space height. No disc herniation. Normal facets. No spinal canal stenosis or neural foraminal narrowing. C7-T1: Normal disc space height. No disc herniation. Normal facets. No spinal canal stenosis or neural foraminal narrowing.     IMPRESSION: 1.  Mild cervical spondylosis without significant cervical spinal canal narrowing. 2.  Mild left neural foraminal stenosis at C4-C5.     MR Lumbar Spine w/o Contrast    Result Date: 2/11/2024  EXAM: MR LUMBAR SPINE W/O CONTRAST LOCATION: [facility] DATE: 2/11/2024 INDICATION: Low back pain; Neurologic deficit, non traumatic; LE weakness; no increasing persistent or sudden onset LE weakness. COMPARISON: None. TECHNIQUE: Routine Lumbar Spine MRI without IV contrast. FINDINGS: Nomenclature is based on 5 lumbar type vertebral bodies. Lumbar spine lordosis is maintained. Lumbar vertebral body heights are maintained. Normal  distal spinal cord and cauda equina with conus medullaris at L2. No extraspinal abnormality. Unremarkable visualized bony pelvis. T12-L1: Normal disc height and signal. No herniation. Normal facets. No spinal canal or neural foraminal stenosis. L1-L2: Normal disc height and signal. No herniation. Normal facets. No spinal canal or neural foraminal stenosis. L2-L3: Normal disc height and signal. No herniation. Normal facets. No spinal canal or neural foraminal stenosis. L3-L4: Normal disc signal and disc space height. Minimal circumferential disc bulge. Normal facets. No spinal canal stenosis or neural foraminal narrowing. L4-L5: Disc desiccation. Normal disc space height. Minimal circumferential disc bulge. Central annular fissure. Normal facets. No spinal canal stenosis or neural foraminal narrowing. L5-S1: Disc desiccation. Mild loss of disc space height. Minimal annular interbody spurring. Normal facets. No spinal canal stenosis. No right and mild left neural foraminal stenosis.     IMPRESSION: 1.  Moderate lumbar spondylosis at L4-L5 and L5-S1. 2.  No high-grade lumbar spinal canal narrowing. 3.  At L5-S1, there is mild left neural foraminal stenosis.                          Again, thank you for allowing me to participate in the care of your patient.        Sincerely,        Daily Golbderg, CNP

## 2024-03-05 ENCOUNTER — TELEPHONE (OUTPATIENT)
Dept: ALLERGY | Facility: CLINIC | Age: 54
End: 2024-03-05
Payer: COMMERCIAL

## 2024-03-05 DIAGNOSIS — J44.9 CHRONIC OBSTRUCTIVE PULMONARY DISEASE, UNSPECIFIED COPD TYPE (H): ICD-10-CM

## 2024-03-05 RX ORDER — TIOTROPIUM BROMIDE INHALATION SPRAY 3.12 UG/1
2 SPRAY, METERED RESPIRATORY (INHALATION) DAILY
Qty: 4 G | Refills: 0 | Status: SHIPPED | OUTPATIENT
Start: 2024-03-05 | End: 2024-04-11

## 2024-03-07 ENCOUNTER — TRANSFERRED RECORDS (OUTPATIENT)
Dept: HEALTH INFORMATION MANAGEMENT | Facility: CLINIC | Age: 54
End: 2024-03-07
Payer: COMMERCIAL

## 2024-03-07 LAB — RETINOPATHY: NEGATIVE

## 2024-03-08 ENCOUNTER — OFFICE VISIT (OUTPATIENT)
Dept: PULMONOLOGY | Facility: CLINIC | Age: 54
End: 2024-03-08
Attending: INTERNAL MEDICINE
Payer: COMMERCIAL

## 2024-03-08 VITALS
BODY MASS INDEX: 24.98 KG/M2 | DIASTOLIC BLOOD PRESSURE: 82 MMHG | SYSTOLIC BLOOD PRESSURE: 128 MMHG | OXYGEN SATURATION: 100 % | HEART RATE: 100 BPM | WEIGHT: 130.6 LBS

## 2024-03-08 DIAGNOSIS — Z91.09 ENVIRONMENTAL ALLERGIES: ICD-10-CM

## 2024-03-08 DIAGNOSIS — J45.51 SEVERE PERSISTENT ASTHMA WITH EXACERBATION (H): Primary | ICD-10-CM

## 2024-03-08 DIAGNOSIS — J45.50 SEVERE PERSISTENT ASTHMA WITHOUT COMPLICATION (H): ICD-10-CM

## 2024-03-08 PROCEDURE — 99214 OFFICE O/P EST MOD 30 MIN: CPT | Performed by: INTERNAL MEDICINE

## 2024-03-08 RX ORDER — ALBUTEROL SULFATE 0.83 MG/ML
2.5 SOLUTION RESPIRATORY (INHALATION) EVERY 6 HOURS PRN
Qty: 360 ML | Refills: 11 | Status: SHIPPED | OUTPATIENT
Start: 2024-03-08

## 2024-03-08 RX ORDER — PREDNISONE 20 MG/1
TABLET ORAL
Qty: 20 TABLET | Refills: 0 | Status: SHIPPED | OUTPATIENT
Start: 2024-03-08 | End: 2024-03-25

## 2024-03-08 RX ORDER — MONTELUKAST SODIUM 10 MG/1
10 TABLET ORAL AT BEDTIME
Qty: 30 TABLET | Refills: 11 | Status: SHIPPED | OUTPATIENT
Start: 2024-03-08

## 2024-03-08 RX ORDER — FLUTICASONE FUROATE AND VILANTEROL 200; 25 UG/1; UG/1
1 POWDER RESPIRATORY (INHALATION) DAILY
Qty: 60 EACH | Refills: 11 | Status: SHIPPED | OUTPATIENT
Start: 2024-03-08

## 2024-03-08 ASSESSMENT — ASTHMA QUESTIONNAIRES
QUESTION_1 LAST FOUR WEEKS HOW MUCH OF THE TIME DID YOUR ASTHMA KEEP YOU FROM GETTING AS MUCH DONE AT WORK, SCHOOL OR AT HOME: MOST OF THE TIME
QUESTION_3 LAST FOUR WEEKS HOW OFTEN DID YOUR ASTHMA SYMPTOMS (WHEEZING, COUGHING, SHORTNESS OF BREATH, CHEST TIGHTNESS OR PAIN) WAKE YOU UP AT NIGHT OR EARLIER THAN USUAL IN THE MORNING: TWO OR THREE NIGHTS A WEEK
QUESTION_2 LAST FOUR WEEKS HOW OFTEN HAVE YOU HAD SHORTNESS OF BREATH: MORE THAN ONCE A DAY
ACT_TOTALSCORE: 8
QUESTION_4 LAST FOUR WEEKS HOW OFTEN HAVE YOU USED YOUR RESCUE INHALER OR NEBULIZER MEDICATION (SUCH AS ALBUTEROL): THREE OR MORE TIMES PER DAY
QUESTION_5 LAST FOUR WEEKS HOW WOULD YOU RATE YOUR ASTHMA CONTROL: POORLY CONTROLLED
ACT_TOTALSCORE: 8

## 2024-03-08 NOTE — PROGRESS NOTES
Pulmonary Outpatient Clinic Follow Up      Assessment/Plan:    54 year old woman with history of organic fuel exposure in the home was previously had nondiagnostic PFTs.  She was previously evaluated by Dr. Marie and i is being treated with monoclonal antibodies for her hypereosinophilia which is likely driving her asthma.  She was recently in the emergency john for another exacerbation and noted to have leukocytosis but tested negative for the triple viral screen.    Severe persistent asthma with exacerbation: Follows in clinic for this and has baseline symptoms regularly.  Appears to be more winded today and endorses fevers and a productive discolored cough.  Will initiate Augmentin 875/125 twice daily for 5 days.  Prednisone taper ordered.  Continue Breo Ellipta 1 puff daily.  She knows to gargle after using this.   Continue Spiriva.  Continue to use 3 times daily albuterol nebs.  Continue Singulair daily.  As needed albuterol rescue.  Tezpire once a month prescribed through the allergy clinic.  Questionable eosinophilic esophagitis:   Follows with GI for this.  Omeprazole 40 mg a day.  As needed Tums.  Follow-up: 6 weeks with me.    Tamra Duong MD  Pulmonary and Critical Care  (p) [phone]      Subjective:   Patient ID: Ms. Amin is a 54 year old female with a past medical history significant for anxiety, arthritis, questionable asthma versus COPD, diabetes, hypertension and a prior history of SVT presents with her daughter in law for a follow-up visit after a recent hospital presentation for worsening SOB.  It appears that multiple family members were sick with a similar viral prodrome and she has been feeling particularly short of breath from her baseline.  She continues to have wheezing, endorses chills, fevers and discolored sputum.          8/24/2023    10:00 AM 10/25/2023     1:00 PM 12/7/2023     1:00 PM   ACT Total Scores   ACT TOTAL SCORE (Goal Greater than or Equal to 20) 9 5 5   In the past 12  months, how many times did you visit the emergency room for your asthma without being admitted to the hospital? 0 0 0   In the past 12 months, how many times were you hospitalized overnight because of your asthma? 0 0        Current Outpatient Medications   Medication Sig Dispense Refill    acetaminophen (TYLENOL) 500 MG tablet TAKE 1 PILL BY MOUTH EVERY 6 HOURS AS NEEDED FOR PAIN 100 tablet 10    albuterol (PROAIR HFA/PROVENTIL HFA/VENTOLIN HFA) 108 (90 Base) MCG/ACT inhaler Inhale 2 puffs into the lungs every 4 hours as needed for shortness of breath 18 g 11    albuterol (PROVENTIL) (2.5 MG/3ML) 0.083% neb solution Take 1 vial (2.5 mg) by nebulization every 6 hours as needed for shortness of breath, wheezing or cough 240 mL 11    amitriptyline (ELAVIL) 50 MG tablet Take 1 tablet (50 mg) by mouth At Bedtime 90 tablet 1    ASPIRIN LOW DOSE 81 MG EC tablet TAKE 1 TABLET (81 MG TOTAL) BY MOUTH DAILY. 90 tablet 3    atorvastatin (LIPITOR) 80 MG tablet Take 1 tablet (80 mg) by mouth daily 90 tablet 3    benzonatate (TESSALON) 100 MG capsule Take 1 capsule (100 mg) by mouth 3 times daily as needed for cough 21 capsule 0    colchicine (COLCRYS) 0.6 MG tablet Take 1 tablet (0.6 mg) by mouth daily 90 tablet 3    Continuous Blood Gluc Sensor (FREESTYLE MONICA 2 SENSOR) [facility] 1 EACH EVERY 14 DAYS USE 1 SENSOR EVERY 14 DAYS. USE TO READ BLOOD SUGARS PER 'S INSTRUCTIONS. 2 each 11    diclofenac (VOLTAREN) 1 % topical gel Apply 4 g topically 4 times daily 350 g 3    docusate sodium (COLACE) 100 MG capsule Take 2 capsules (200 mg) by mouth 2 times daily as needed for constipation 90 capsule 3    ferrous sulfate (FEROSUL) 325 (65 Fe) MG tablet Take 1 tablet (325 mg) by mouth daily (with breakfast) 90 tablet 1    fluticasone-vilanterol (BREO ELLIPTA) 200-25 MCG/ACT inhaler Inhale 1 puff into the lungs daily 60 each 11    gabapentin (NEURONTIN) 600 MG tablet TAKE 1 PILL (600 MG) BY MOUTH 3 TIMES DAILY 90 tablet 3     galcanezumab-gnlm (EMGALITY) 120 MG/ML injection Inject 2 mLs (240 mg) Subcutaneous every 28 days Loading dose. 2 mL 0    galcanezumab-gnlm (EMGALITY) 120 MG/ML injection Inject 1 mL (120 mg) Subcutaneous every 28 days 1 mL 3    hydrochlorothiazide (MICROZIDE) 12.5 MG capsule Take 1 capsule (12.5 mg) by mouth every morning 90 capsule 3    insulin aspart (NOVOLOG PEN) 100 UNIT/ML pen Inject 20 Units Subcutaneous 2 times daily (before meals) 45 mL 3    insulin glargine (LANTUS PEN) 100 UNIT/ML pen Inject 44 Units Subcutaneous every morning 45 mL 3    insulin pen needle (32G X 4 MM) 32G X 4 MM miscellaneous Use 3 pen needles daily or as directed. 300 each 4    Lidocaine (LIDOCARE) 4 % Patch Place 1 patch onto the skin every 24 hours To prevent lidocaine toxicity, patient should be patch free for 12 hrs daily. 30 patch 1    loratadine (CLARITIN) 10 MG tablet Take 10 mg by mouth daily      meclizine (ANTIVERT) 12.5 MG tablet TAKE 2 TABLETS (25 MG) BY MOUTH 3 TIMES DAILY AS NEEDED FOR DIZZINESS 90 tablet 0    metFORMIN (GLUCOPHAGE XR) 500 MG 24 hr tablet Take 2 tablets (1,000 mg) by mouth 2 times daily (with meals) 360 tablet 3    metoprolol tartrate (LOPRESSOR) 25 MG tablet TAKE 1 TABLET (25 MG TOTAL) BY MOUTH 2 (TWO) TIMES A DAY FOR BLOOD PRESSURE 180 tablet 3    montelukast (SINGULAIR) 10 MG tablet Take 1 tablet (10 mg) by mouth At Bedtime 30 tablet 11    omeprazole (PRILOSEC) 40 MG DR capsule Take 1 capsule (40 mg) by mouth daily 90 capsule 3    ondansetron (ZOFRAN ODT) 4 MG ODT tab Take 1-2 tablets (4-8 mg) by mouth every 8 hours as needed for nausea or vomiting 20 tablet 0    predniSONE (DELTASONE) 20 MG tablet Take 2 tabs daily x 7 days 14 tablet 0    sucralfate (CARAFATE) 1 GM/10ML suspension Take 10 mLs (1 g) by mouth 4 times daily 3600 mL 1    tiotropium (SPIRIVA RESPIMAT) 2.5 MCG/ACT inhaler INHALE 2 PUFFS INTO THE LUNGS DAILY 4 g 0    tiZANidine (ZANAFLEX) 2 MG tablet Take 1 tablet (2 mg) by mouth 3 times  daily as needed for muscle spasms 270 tablet 0     Social history:  Lives in a house built in 1963; no concern for mold in the house.  7 People live in the house including 2 children.  There are no pets in the house.  She is a never smoker and there is no second hand exposure to smoke.  She does not drink alcoholic beverages and denies indulging in recreational drugs.    Physical Exam   There were no vitals taken for this visit.    Physical Exam  Constitutional:       General: She is not in acute distress.     Appearance: She is not ill-appearing or diaphoretic.   Cardiovascular:      Rate and Rhythm: Normal rate and regular rhythm.      Pulses: Normal pulses.      Heart sounds: Normal heart sounds.   Pulmonary:      Effort: Pulmonary effort is normal. No respiratory distress.      Breath sounds: Wheezing present. No rhonchi.   Musculoskeletal:      Right lower leg: No edema.      Left lower leg: No edema.   Skin:     General: Skin is warm and dry.      Findings: No rash.   Neurological:      Mental Status: She is alert.   Psychiatric:         Behavior: Behavior normal.            Latest Reference Range & Units 02/07/22 15:47   Neutrophil Cytoplasmic Antibody <1:10  <1:10   Neutrophil Cytoplasmic Antibody Pattern  The ANCA IFA is <1:10.  No further testing will be performed.      Latest Reference Range & Units 02/07/22 15:47   Schistosoma Ab IgG Negative  Negative [1]   Strongyloides Bere IgG <=0.9 IV 0.4 [2]      Latest Reference Range & Units 02/07/22 15:47   Immunoglobulin E <=214 kU/L 74 [1]   Allergen Fungimold A Fumigatus IgE <=0.34 kU/L <0.10 [2]   Aspergillus Fumagatis 1 Antibody None Detected  None Detected [3]   Aspergillus Fumagatis 6 Antibody None Detected  None Detected [4]   Allergen, Interp, Immunocap Score IgE  See Note [5]     XR CHEST 2 VIEWS, DATE/TIME: 3/27/2023 8:32 AM  INDICATION: chest pain  COMPARISON: 02/05/2023                                                              IMPRESSION: No  pneumothorax or pleural effusion. No focal consolidation. Cardiac silhouette within normal limits. Mildly atherosclerotic aorta.    CT CHEST W/O CONTRAST, DATE/TIME: 7/11/2022 11:28 PM  INDICATION: Chest pain.  COMPARISON: 5/4/2022.  TECHNIQUE: CT chest without IV contrast. Multiplanar reformats were obtained. Dose reduction techniques were used.  CONTRAST: None.  FINDINGS:   LUNGS AND PLEURA: There is atelectasis and scarring at the lung bases. Scarring at the lung apices. Mild dependent atelectasis bilaterally. Subpleural calcified granuloma or calcified plaque in the right lower lobe laterally. Stable 2 mm nodule in the   right upper lobe posteriorly. Stable 3 mm nodule in the lingula laterally. Tiny nodule along the left major fissure laterally is stable. No pneumothorax or pleural effusion.  MEDIASTINUM/AXILLAE: No lymph node enlargement. Central airways are unremarkable. The heart size is normal.  CORONARY ARTERY CALCIFICATION: Previous intervention (stents or CABG).                                                             IMPRESSION:   1.  No acute abnormality. No cause of chest pain is evident.  2.  A few small lung nodules without significant change.  Recommendations for one or multiple incidental lung nodules < 6mm :    Low risk patients: No routine follow-up.    High risk patients: Optional follow-up CT at 12 months; if unchanged, no further follow-up.    ECHO 5/4/2022  Interpretation Summary  Left ventricular size, wall motion and function are normal. The ejection  fraction is 55-60%.  There is borderline anterior, septal, and apical wall hypokinesis.  Normal right ventricle size and systolic function.  No hemodynamically significant valvular abnormalities on 2D or color flow imaging.

## 2024-03-10 NOTE — TELEPHONE ENCOUNTER
PA Initiation    Medication: BREO ELLIPTA 200-25 MCG/ACT IN AEPB  Insurance Company: [employer] - Phone [phone] Fax [phone]  Pharmacy Filling the Rx: [facility] - [place], [place] - [facility] [address]  Filling Pharmacy Phone: [phone]  Filling Pharmacy Fax: [phone]  Start Date: 3/9/2024    Key: [id number]

## 2024-03-12 NOTE — TELEPHONE ENCOUNTER
Prior Authorization Approval    Medication: BREO ELLIPTA 200-25 MCG/ACT IN AEPB  Authorization Effective Date: 2/8/2024  Authorization Expiration Date: 3/10/2025  Approved Dose/Quantity: #60 per 30 days  Reference #: Key: [id number]   Insurance Company: [employer] - Phone [phone] Fax [phone]  Expected CoPay: $ 0  CoPay Card Available:      Financial Assistance Needed: No  Which Pharmacy is filling the prescription: [facility] - [place], [place] - [address] [place] [address]  Pharmacy Notified: Yes  Patient Notified: Pharmacy will call when ready, ordering for tomorrow.

## 2024-03-13 ENCOUNTER — THERAPY VISIT (OUTPATIENT)
Dept: PHYSICAL THERAPY | Facility: REHABILITATION | Age: 54
End: 2024-03-13
Attending: NURSE PRACTITIONER
Payer: COMMERCIAL

## 2024-03-13 DIAGNOSIS — M54.41 CHRONIC BILATERAL LOW BACK PAIN WITH BILATERAL SCIATICA: Primary | ICD-10-CM

## 2024-03-13 DIAGNOSIS — M51.369 BULGING LUMBAR DISC: ICD-10-CM

## 2024-03-13 DIAGNOSIS — M54.42 CHRONIC BILATERAL LOW BACK PAIN WITH BILATERAL SCIATICA: Primary | ICD-10-CM

## 2024-03-13 DIAGNOSIS — G89.29 CHRONIC BILATERAL LOW BACK PAIN WITH BILATERAL SCIATICA: Primary | ICD-10-CM

## 2024-03-13 DIAGNOSIS — M54.16 LUMBAR RADICULOPATHY: ICD-10-CM

## 2024-03-13 DIAGNOSIS — M48.061 LUMBAR FORAMINAL STENOSIS: ICD-10-CM

## 2024-03-13 PROCEDURE — 97110 THERAPEUTIC EXERCISES: CPT | Mod: GP | Performed by: PHYSICAL THERAPIST

## 2024-03-13 PROCEDURE — 97161 PT EVAL LOW COMPLEX 20 MIN: CPT | Mod: GP | Performed by: PHYSICAL THERAPIST

## 2024-03-13 NOTE — PROGRESS NOTES
"PHYSICAL THERAPY EVALUATION  Type of Visit: Evaluation    See electronic medical record for Abuse and Falls Screening details.    Subjective       Presenting condition or subjective complaint:    Date of onset: 03/04/24    Relevant medical history:     Dates & types of surgery:      Prior diagnostic imaging/testing results:       Prior therapy history for the same diagnosis, illness or injury:        Patient has lower back pain for 10 years. Did some therapy, and reports feeling better while doing this. It was at home. Having pain in both legs, comes and goes, notices mostly in the morning time, and takes pain medication to alleviate this. Thinks maybe a nerve issue is causing her symptoms.  Aggravating factors: \"if I sit for a long time\"   alleviating factors: \"sometimes I use ice.\"    Physical activity: \"I am not doing anything right now, I have asthma and pain in legs\".     Has used a cane for more than 10-12 years. Reports sometimes losing balance with walking.       Living Environment  Social support:     Type of home:     Stairs to enter the home:         Ramp:     Stairs inside the home:         Help at home:    Equipment owned:       Employment:      Hobbies/Interests:      Patient goals for therapy:  sleep better    Pain assessment: Location: right side lower back (aching pain)/Rating: can get up to 10/10 \"anytime\", currently about a 7/10.      Objective   LUMBAR SPINE EVALUATION  PAIN:   INTEGUMENTARY (edema, incisions):   POSTURE:   GAIT:   Weightbearing Status:   Assistive Device(s): Cane (single end), Walker (front wheeled)  Gait Deviations: Stride length decreased  Antonietta decreased  BALANCE/PROPRIOCEPTION:   WEIGHTBEARING ALIGNMENT:   NON-WEIGHTBEARING ALIGNMENT:    ROM:   PELVIC/SI SCREEN:   STRENGTH:     MYOTOMES:    Left Right   T12-L3 (Hip Flexion) 2+ 2+   L2-4 (Quads)  3- 3-   L4 (Ankle DF) 3+ 3+   L5 (Great Toe Ext) 3- 3-   S1 (Toe Raise) 3- 3-     DTR S:   CORD SIGNS:   DERMATOMES:   NEURAL " TENSION:   FLEXIBILITY:   LUMBAR/HIP Special Tests:    PELVIS/SI SPECIAL TESTS:   FUNCTIONAL TESTS:  sit to stand: unable to perform without significant use of upper extremities  PALPATION:   SPINAL SEGMENTAL CONCLUSIONS:       TU minutes 25 seconds using front wheeled walker    Assessment & Plan   CLINICAL IMPRESSIONS  Medical Diagnosis: M54.42, M54.41, G89.29 (ICD-10-CM) - Chronic bilateral low back pain with bilateral sciatica  M51.36 (ICD-10-CM) - Bulging lumbar disc  M48.061 (ICD-10-CM) - Lumbar foraminal stenosis  M54.16 (ICD-10-CM) - Lumbar radiculopathy    Treatment Diagnosis: low back pain with generalized weakness and deconditioning, falls risk   Impression/Assessment: Patient is a 54 year old female with chief complaints of low back and bilateral leg pain. Patient has decreased overall mobility and is high risk for falls. Recommended ambulating at all times with front wheeled walker.  The following significant findings have been identified: Pain, Decreased ROM/flexibility, Decreased strength, Impaired gait, Impaired muscle performance, and Decreased activity tolerance. These impairments interfere with their ability to perform self care tasks, recreational activities, household chores, household mobility, and community mobility as compared to previous level of function.     Clinical Decision Making (Complexity):  Clinical Presentation: Stable/Uncomplicated  Clinical Presentation Rationale: based on medical and personal factors listed in PT evaluation  Clinical Decision Making (Complexity): Low complexity    PLAN OF CARE  Treatment Interventions:  Interventions: Gait Training, Manual Therapy, Neuromuscular Re-education, Therapeutic Activity, Therapeutic Exercise, Self-Care/Home Management    Long Term Goals     PT Goal 1  Goal Identifier: HEP  Goal Description: Patient will demonstrate >50% compliance with home exercise program to promote independence and progress towards  Target Date: 24  PT Goal  2  Goal Identifier: Patient will perform sit to stand with minimal to no use of upper extremities to improve lower extremity function and mobility and reduce falls risk  Target Date: 06/06/24      Frequency of Treatment: every other week  Duration of Treatment: 12 weeks    Recommended Referrals to Other Professionals:   Education Assessment:   Learner/Method: Patient    Risks and benefits of evaluation/treatment have been explained.   Patient/Family/caregiver agrees with Plan of Care.     Evaluation Time:     PT Eval, Low Complexity Minutes (89076): 20       Signing Clinician: NICHELLE Estrada [facility]                                                                                   OUTPATIENT PHYSICAL THERAPY      PLAN OF TREATMENT FOR OUTPATIENT REHABILITATION   Patient's Last Name, First Name, M.IAnalilia  Kulwant Amin YOB: 1970   Provider's Name   [facility]   Medical Record No.  [id number]     Onset Date: 03/04/24  Start of Care Date: 03/13/24     Medical Diagnosis:  M54.42, M54.41, G89.29 (ICD-10-CM) - Chronic bilateral low back pain with bilateral sciatica  M51.36 (ICD-10-CM) - Bulging lumbar disc  M48.061 (ICD-10-CM) - Lumbar foraminal stenosis  M54.16 (ICD-10-CM) - Lumbar radiculopathy      PT Treatment Diagnosis:  low back pain with generalized weakness and deconditioning, falls risk Plan of Treatment  Frequency/Duration: every other week/ 12 weeks    Certification date from 03/13/24 to 06/06/24         See note for plan of treatment details and functional goals     Ceferino Cole PT                         I CERTIFY THE NEED FOR THESE SERVICES FURNISHED UNDER        THIS PLAN OF TREATMENT AND WHILE UNDER MY CARE     (Physician attestation of this document indicates review and certification of the therapy plan).              Referring Provider:  Daily Goldberg    Initial Assessment  See Epic Evaluation- Start of Care  Date: 03/13/24

## 2024-03-14 ENCOUNTER — OFFICE VISIT (OUTPATIENT)
Dept: FAMILY MEDICINE | Facility: CLINIC | Age: 54
End: 2024-03-14
Payer: COMMERCIAL

## 2024-03-14 VITALS
DIASTOLIC BLOOD PRESSURE: 79 MMHG | SYSTOLIC BLOOD PRESSURE: 119 MMHG | WEIGHT: 128.7 LBS | RESPIRATION RATE: 16 BRPM | OXYGEN SATURATION: 98 % | TEMPERATURE: 98.1 F | BODY MASS INDEX: 24.3 KG/M2 | HEIGHT: 61 IN | HEART RATE: 93 BPM

## 2024-03-14 DIAGNOSIS — J44.9 CHRONIC OBSTRUCTIVE PULMONARY DISEASE, UNSPECIFIED COPD TYPE (H): ICD-10-CM

## 2024-03-14 DIAGNOSIS — F33.9 RECURRENT MAJOR DEPRESSIVE DISORDER, REMISSION STATUS UNSPECIFIED (H): ICD-10-CM

## 2024-03-14 DIAGNOSIS — E11.9 TYPE 2 DIABETES MELLITUS TREATED WITH INSULIN (H): ICD-10-CM

## 2024-03-14 DIAGNOSIS — Z79.4 TYPE 2 DIABETES MELLITUS TREATED WITH INSULIN (H): ICD-10-CM

## 2024-03-14 DIAGNOSIS — R05.9 COUGH, UNSPECIFIED TYPE: Primary | ICD-10-CM

## 2024-03-14 DIAGNOSIS — K59.00 CONSTIPATION, UNSPECIFIED CONSTIPATION TYPE: ICD-10-CM

## 2024-03-14 PROCEDURE — 99214 OFFICE O/P EST MOD 30 MIN: CPT | Performed by: FAMILY MEDICINE

## 2024-03-14 RX ORDER — AZITHROMYCIN 250 MG/1
TABLET, FILM COATED ORAL
Qty: 6 TABLET | Refills: 0 | Status: SHIPPED | OUTPATIENT
Start: 2024-03-14 | End: 2024-03-19

## 2024-03-14 RX ORDER — DOCUSATE SODIUM 100 MG/1
200 CAPSULE, LIQUID FILLED ORAL 2 TIMES DAILY PRN
Qty: 90 CAPSULE | Refills: 3 | Status: SHIPPED | OUTPATIENT
Start: 2024-03-14 | End: 2024-09-05

## 2024-03-14 RX ORDER — PREDNISONE 20 MG/1
TABLET ORAL
Qty: 40 TABLET | Refills: 0 | Status: SHIPPED | OUTPATIENT
Start: 2024-03-14 | End: 2024-03-25

## 2024-03-14 NOTE — PROGRESS NOTES
Cough, unspecified type  Was seen in the ED and pulmonology in the recent weeks.  Completed Augmentin, no improvement per patient.  Will try Z-Isaiah and another round of prednisone taper.  Follow-up in 2 weeks.    Chronic obstructive pulmonary disease, unspecified COPD type (H)  - predniSONE (DELTASONE) 20 MG tablet; Take 4 tabs daily for 4 days, then 3 tabs daily for 4 days, then 2 tabs daily for 4 days, then one tab daily for 4 days and stop.  - azithromycin (ZITHROMAX) 250 MG tablet; Take 2 tablets (500 mg) by mouth daily for 1 day, THEN 1 tablet (250 mg) daily for 4 days.    Type 2 diabetes mellitus treated with insulin (H)  Daughter-in-law will monitor blood sugar closely and adjust mealtime insulin if needed.    Constipation, unspecified constipation type  - docusate sodium (COLACE) 100 MG capsule; Take 2 capsules (200 mg) by mouth 2 times daily as needed for constipation    Recurrent major depressive disorder, remission status unspecified (H24)  Stable.    Doug Blum is a 54 year female with multiple chronic medical conditions seeing multiple specialist here for follow-up.  Her main concern today is ongoing cough.  She was seen in the ED on 3/1/2024, chest x-ray showed no acute findings was given Tessalon.  She was seen by pulmonology on 3/8/2024, was given Augmentin.  She completed Augmentin.  Daughter-in-law also states that she completed 5 days course of oral steroid before she was seen by pulmonology.  States Augmentin did not help for the cough.  Reports subjective fever.  No acute chest pain or palpitations.  She is currently using Lantus 44 units and NovoLog 14 units twice a day before meals.  Her glucose readings are in low 100s and mid 100s most days.  Occasional 200s in the recent weeks.  Depression symptoms are stable.  Needs constipation medication refill.        3/14/2024     9:33 AM   Additional Questions   Roomed by marco armando   Accompanied by Fidelina in-laws         Review of  "Systems  CV: NEGATIVE for chest pain/chest pressure      Objective    /79 (BP Location: Right arm, Patient Position: Sitting, Cuff Size: Adult Regular)   Pulse 93   Temp 98.1  F (36.7  C) (Oral)   Resp 16   Ht 1.54 m (5' 0.63\")   Wt 58.4 kg (128 lb 11.2 oz)   SpO2 98%   BMI 24.62 kg/m    Body mass index is 24.62 kg/m .  Physical Exam   GENERAL: alert and no distress  NECK: Supple.   RESP: Coarse breath sounds bilaterally, no crackles or rhonchi.  CV: regular rates and rhythm, no murmur, click or rub, and no peripheral edema  ABDOMEN: soft, nontender, no hepatosplenomegaly, no masses and bowel sounds normal  PSYCH: mentation appears normal, affect normal/bright    This transcription uses voice recognition software, which may contain typographical errors.          Signed Electronically by: Adrien Cardoza MD    "

## 2024-03-18 ENCOUNTER — OFFICE VISIT (OUTPATIENT)
Dept: NEUROLOGY | Facility: CLINIC | Age: 54
End: 2024-03-18
Payer: COMMERCIAL

## 2024-03-18 VITALS
WEIGHT: 131 LBS | HEART RATE: 79 BPM | SYSTOLIC BLOOD PRESSURE: 114 MMHG | DIASTOLIC BLOOD PRESSURE: 75 MMHG | BODY MASS INDEX: 25.06 KG/M2

## 2024-03-18 DIAGNOSIS — I63.50 CEREBROVASCULAR ACCIDENT OF RIGHT PONTINE STRUCTURE (H): ICD-10-CM

## 2024-03-18 DIAGNOSIS — G43.719 INTRACTABLE CHRONIC MIGRAINE WITHOUT AURA AND WITHOUT STATUS MIGRAINOSUS: ICD-10-CM

## 2024-03-18 DIAGNOSIS — R25.2 LEG CRAMPS: Primary | ICD-10-CM

## 2024-03-18 DIAGNOSIS — R79.0 LOW FERRITIN: ICD-10-CM

## 2024-03-18 DIAGNOSIS — G44.40 MEDICATION OVERUSE HEADACHE: ICD-10-CM

## 2024-03-18 DIAGNOSIS — R29.898 WEAKNESS OF BOTH LOWER EXTREMITIES: ICD-10-CM

## 2024-03-18 DIAGNOSIS — M48.061 SPINAL STENOSIS OF LUMBAR REGION WITHOUT NEUROGENIC CLAUDICATION: ICD-10-CM

## 2024-03-18 PROCEDURE — 99215 OFFICE O/P EST HI 40 MIN: CPT | Performed by: PSYCHIATRY & NEUROLOGY

## 2024-03-18 PROCEDURE — G2211 COMPLEX E/M VISIT ADD ON: HCPCS | Performed by: PSYCHIATRY & NEUROLOGY

## 2024-03-18 RX ORDER — FERROUS SULFATE 325(65) MG
325 TABLET ORAL
Qty: 90 TABLET | Refills: 1 | Status: SHIPPED | OUTPATIENT
Start: 2024-03-18 | End: 2024-09-12

## 2024-03-18 RX ORDER — AMITRIPTYLINE HYDROCHLORIDE 50 MG/1
50 TABLET ORAL AT BEDTIME
Qty: 90 TABLET | Refills: 1 | Status: SHIPPED | OUTPATIENT
Start: 2024-03-18

## 2024-03-18 RX ORDER — TIZANIDINE 2 MG/1
2 TABLET ORAL 3 TIMES DAILY PRN
Qty: 270 TABLET | Refills: 0 | Status: SHIPPED | OUTPATIENT
Start: 2024-03-18 | End: 2024-06-10

## 2024-03-18 NOTE — LETTER
3/18/2024         RE: Kulwant Amin  [address] [facility] [address]        Dear Colleague,    Thank you for referring your patient, Kulwant Amin, to the [facility] [place]. Please see a copy of my visit note below.    NEUROLOGY OUTPATIENT PROGRESS NOTE  Mar 18, 2024     CHIEF COMPLAINT/REASON FOR VISIT/REASON FOR CONSULT  Patient presents with:  Follow Up: Symptoms are about the same.   Medications are somewhat a relief.   Discuss botox    REASON FOR CONSULTATION- Multiple issues    REFERRAL SOURCE   Referred Self  CC  Referred Self    HISTORY OF PRESENT ILLNESS  Kulwant Amin is a 54 year old female seen today for evaluation of multiple issues.    In 2021 she was found to have a right pontine stroke.  Presenting symptoms of vertigo.  No clear etiology for the stroke was identified it was thought to be lacunar.  She does have a history of hypertension, hyperlipidemia, diabetes.  She was supposed to be on Plavix though currently is only on aspirin.  No recurrence of stroke symptoms.  She is presented to the ER since then with vertigo symptoms and her imaging studies have been negative    1.  She reports that the vertigo is there all the time.  She is using meclizine which she finds some benefit with. She has done vestibular rehab without any improvement.    2.  Further complains of headaches.  These are around-the-clock.  He is taking Tylenol every 8 hours to get rid of the headaches.  Headaches are more frontal.  They can be throbbing in nature with photophobia and phonophobia.  She does continue visual auras as well.  She is on amitriptyline at nighttime.  She feels that it might be helping with the headaches.  Does not get good sleep due to COPD.  Headaches started after her stroke.    3.  No other new strokelike symptoms.    4.  Does complain of some lightheadedness especially during the exam today.  Has been put on hydrochlorothiazide by her primary care  provider.    5/24/23  Patient returns today  1.  No stroke symptoms.  Is tolerating her stroke medications  2.  Continues to have continuous headache 24/7.  Occasionally it will go away but then come back.  Is still overusing Tylenol.  Generally uses 1 tablet a day but can use up to 3 tablets a day.  Amitriptyline did help with the headaches and now they are not getting as severe as compared to before.  3.  Still feels dizzy.  Drinking plenty of water.    8/10/23  Patient returns today  1.  No further strokelike symptoms.  Continues to complain of some dizziness.  2.  Headaches have improved with use of the amitriptyline.  She continues to have a very mild continuous headache all the time.  She is only using the Tylenol 1-2 times a week.  The more severe headaches are only 1-2 times a week as well.  Amitriptyline does help without side effects though she would like to try other medications  3.  Drinks plenty of water.  No history of kidney stones.    10/26/23  Patient returns to the  1.  No further strokelike symptoms.  2.  Continues to have headaches every day.  Some associated dizziness.  She did try the Topamax and that was thought to be causing muscle cramps and as a result she stopped it.  No benefit with the headaches.  3.  Muscle cramps in the legs are still present.  When asked where she has pain in her legs she describes all over with stretching her knee and she might have knee pain.  She also reports bilateral leg weakness.  Does have chronic back pain that has not really worsened.  Some neck stiffness as well.  No other new neurological symptoms.    1/16/24  Patient returns today.  1.  No further strokelike symptoms.  Has had been having a lot of left leg weakness.  Was seen in the ER and put on some steroids.  This is not really helped.  There is pain and spasms in the left leg along with weakness.  Continues to have back pain.  Has not been able to do the MRIs  2.  Headaches are better and she is having  5 headaches a month.  Emgality does help with the headaches.  Tylenol does help with the headaches and also prevents headaches for few days.  3.  No further symptoms.    3/18/24  Patient demonstrated  1.  No further strokelike symptoms  2.  Continues to have weakness in her legs though muscle cramps and spasms are much better with use of tizanidine.  Is working with physical therapy which is also helping.  3.  Continues to stay on the Emgality and the amitriptyline.  Headaches are 2-3 times a week but much less severe compared to before.  No side effects with the medication.  Headaches are much more tolerable and does not want more medications  No other new concerns.    Previous history is reviewed and this is unchanged.    PAST MEDICAL/SURGICAL HISTORY  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     Microcytic anemia 11/17/2019     S/P coronary artery stent placement 02/02/2018     TB lung, latent     9 mos INH     Patient Active Problem List   Diagnosis     Pulmonary nodule, right     Environmental allergies     TB lung, latent     COPD (chronic obstructive pulmonary disease)/Asthma      Essential hypertension     Cataracts, bilateral     Corneal opacity     Irregular astigmatism     Anxiety     Chronic nonintractable headache, unspecified headache type     Coronary artery disease due to lipid rich plaque     Dizziness     Hyperlipidemia     Moderate major depression (H)     Moderate  persistent asthma     Unspecified visual loss     GERD (gastroesophageal reflux disease)     Dental caries     H/O arterial ischemic stroke     Constipation     Dysphagia     Chronic abdominal pain     Insomnia     FLACO (obstructive sleep apnea)- mild (AHI 14)     Chest pain     Chest pain, unspecified type     Type 2 diabetes mellitus treated with insulin (H)   Significant for arthritis, diabetes, high blood pressure, hyperlipidemia, stroke    FAMILY HISTORY  Family History   Problem Relation Age of Onset     Other - See Comments Mother          of an intestinal problem     Ulcers Father          of gastritis     Breast Cancer No family hx of      Ovarian Cancer No family hx of      Colon Cancer No family hx of    Negative for stroke    SOCIAL HISTORY  Social History     Tobacco Use     Smoking status: Former     Packs/day: 1.00     Years: 30.00     Additional pack years: 0.00     Total pack years: 30.00     Types: Cigarettes     Quit date: 2003     Years since quittin.2     Passive exposure: Never     Smokeless tobacco: Never     Tobacco comments:     No passive exposure   Vaping Use     Vaping Use: Never used   Substance Use Topics     Alcohol use: No     Drug use: No       SYSTEMS REVIEW  Twelve-system ROS was done and other than the HPI this was negative except for back pain, joint pain, numbness and tingling, weakness/paralysis, difficulty walking, balance/coordination problems, dizziness, headaches, anxiety, cardiac/heart problems, respiratory problems, snoring loudly.  No new symptoms/issues.    MEDICATIONS  acetaminophen (TYLENOL) 500 MG tablet, TAKE 1 PILL BY MOUTH EVERY 6 HOURS AS NEEDED FOR PAIN  albuterol (PROAIR HFA/PROVENTIL HFA/VENTOLIN HFA) 108 (90 Base) MCG/ACT inhaler, Inhale 2 puffs into the lungs every 4 hours as needed for shortness of breath  albuterol (PROVENTIL) (2.5 MG/3ML) 0.083% neb solution, Take 1 vial (2.5 mg) by nebulization every 6 hours as needed for shortness of  breath, wheezing or cough  ASPIRIN LOW DOSE 81 MG EC tablet, TAKE 1 TABLET (81 MG TOTAL) BY MOUTH DAILY.  atorvastatin (LIPITOR) 80 MG tablet, Take 1 tablet (80 mg) by mouth daily  azithromycin (ZITHROMAX) 250 MG tablet, Take 2 tablets (500 mg) by mouth daily for 1 day, THEN 1 tablet (250 mg) daily for 4 days.  benzonatate (TESSALON) 100 MG capsule, Take 1 capsule (100 mg) by mouth 3 times daily as needed for cough  colchicine (COLCRYS) 0.6 MG tablet, Take 1 tablet (0.6 mg) by mouth daily  Continuous Blood Gluc Sensor (FREESTYLE MONICA 2 SENSOR) [facility], 1 EACH EVERY 14 DAYS USE 1 SENSOR EVERY 14 DAYS. USE TO READ BLOOD SUGARS PER 'S INSTRUCTIONS.  diclofenac (VOLTAREN) 1 % topical gel, Apply 4 g topically 4 times daily  docusate sodium (COLACE) 100 MG capsule, Take 2 capsules (200 mg) by mouth 2 times daily as needed for constipation  fluticasone-vilanterol (BREO ELLIPTA) 200-25 MCG/ACT inhaler, Inhale 1 puff into the lungs daily  gabapentin (NEURONTIN) 600 MG tablet, TAKE 1 PILL (600 MG) BY MOUTH 3 TIMES DAILY  hydrochlorothiazide (MICROZIDE) 12.5 MG capsule, Take 1 capsule (12.5 mg) by mouth every morning  insulin aspart (NOVOLOG PEN) 100 UNIT/ML pen, Inject 20 Units Subcutaneous 2 times daily (before meals)  insulin glargine (LANTUS PEN) 100 UNIT/ML pen, Inject 44 Units Subcutaneous every morning  insulin pen needle (32G X 4 MM) 32G X 4 MM miscellaneous, Use 3 pen needles daily or as directed.  Lidocaine (LIDOCARE) 4 % Patch, Place 1 patch onto the skin every 24 hours To prevent lidocaine toxicity, patient should be patch free for 12 hrs daily.  loratadine (CLARITIN) 10 MG tablet, Take 10 mg by mouth daily  meclizine (ANTIVERT) 12.5 MG tablet, TAKE 2 TABLETS (25 MG) BY MOUTH 3 TIMES DAILY AS NEEDED FOR DIZZINESS  metFORMIN (GLUCOPHAGE XR) 500 MG 24 hr tablet, Take 2 tablets (1,000 mg) by mouth 2 times daily (with meals)  metoprolol tartrate (LOPRESSOR) 25 MG tablet, TAKE 1 TABLET (25 MG TOTAL) BY MOUTH 2  (TWO) TIMES A DAY FOR BLOOD PRESSURE  montelukast (SINGULAIR) 10 MG tablet, Take 1 tablet (10 mg) by mouth at bedtime  omeprazole (PRILOSEC) 40 MG DR capsule, Take 1 capsule (40 mg) by mouth daily  ondansetron (ZOFRAN ODT) 4 MG ODT tab, Take 1-2 tablets (4-8 mg) by mouth every 8 hours as needed for nausea or vomiting  predniSONE (DELTASONE) 20 MG tablet, Take 4 tabs daily for 4 days, then 3 tabs daily for 4 days, then 2 tabs daily for 4 days, then one tab daily for 4 days and stop.  predniSONE (DELTASONE) 20 MG tablet, Take 3 tabs by mouth daily x 3 days, then 2 tabs daily x 3 days, then 1 tab daily x 3 days, then 1/2 tab daily x 3 days.  sucralfate (CARAFATE) 1 GM/10ML suspension, Take 10 mLs (1 g) by mouth 4 times daily  tiotropium (SPIRIVA RESPIMAT) 2.5 MCG/ACT inhaler, INHALE 2 PUFFS INTO THE LUNGS DAILY  predniSONE (DELTASONE) 20 MG tablet, Take 2 tabs daily x 7 days (Patient not taking: Reported on 3/8/2024)    tezepelumab-ekko (TEZSPIRE) injection 210 mg         PHYSICAL EXAMINATION  VITALS: /75   Pulse 79   Wt 59.4 kg (131 lb)   BMI 25.06 kg/m    GENERAL: Healthy appearing, alert, no acute distress, normal habitus.  CARDIOVASCULAR: Extremities warm and well perfused. Pulses present.   NEUROLOGICAL:  Patient is awake and oriented to self, place and time.  Attention span is normal.  Memory is grossly intact.  Language is fluent and follows commands appropriately.  Appropriate fund of knowledge. Cranial nerves 2-12 are intact. There is no pronator drift.  Motor exam shows  4/5 strength in all extremities-effort dependent.  Generalized weakness due to pain all over.  Tone is symmetric bilaterally in upper and lower extremities.  Previously reflexes are symmetric and 1+ in upper extremities and lower extremities. Sensory exam is grossly intact to light touch, pin prick and vibration.  Finger to nose and heel to shin is without dysmetria.  Romberg is negative.  Gait is normal and the patient is able to do  tandem walk and walk on toes and heels with mild difficulty.  Orthostatic vitals    Exam shows some worsening left hip flexion weakness related to pain.  Possibly this is more knee pain than neurological pain.  Exam stable.  Continues to have weakness in the legs.    DIAGNOSTICS  MRI 2022  IMPRESSION:  1.  No acute intracranial abnormalities identified.  2.  Minor chronic small vessel ischemic change, otherwise unremarkable contrast-enhanced MRI of the brain.    MRA                                                               IMPRESSION:  1.  Normal MRA [place].      MRA neck  IMPRESSION:  1.  Normal neck MRA.    Cardiac event monitor    ECHo  Left ventricular size, wall motion and function are normal. The ejection  fraction is 55-60%.  There is borderline anterior, septal, and apical wall hypokinesis.  Normal right ventricle size and systolic function.  No hemodynamically significant valvular abnormalities on 2D or color flow  Imaging.      CTA 2021  HEAD CT:  1.  No evidence of acute hemorrhage, mass effect, or acute vascular territorial infarction.  2.  Severe left sphenoid sinus disease.     HEAD CTA:   1.  No evidence of proximal large vessel occlusion or flow-limiting stenosis.  2.  Dorsally directed 2 mm contour irregularity arising from the distal left supraclinoid ICA may represent a tiny posterior communicating artery aneurysm.     NECK CTA:  1.  No hemodynamically significant stenosis based on NASCET criteria.  2.  Mild left V1 segment stenosis.  3.  No evidence for dissection.      MRI 2020  IMPRESSION:  MRI BRAIN:  1. No finding for acute infarct, hemorrhage, or mass.  2. There are a few very small foci of FLAIR hyperintensity within the cerebral white matter, nonspecific but compatible with small areas of gliosis and/or chronic myelin loss.     MRA [place]:  1. Congenital variation of the [place] without vascular cutoff, aneurysm, or flow-limiting stenosis.      MRI  2021  HEAD MRI:   1.  Tiny linear focus of diffusion restriction within the right dorsal diomedes suspicious for acute small vessel infarct. This is new compared to 12/07/2020.  2.  No hemorrhagic transformation or mass effect. No additional infarct identified.  3.  Mild presumed microvascular ischemic change within the cerebral white matter.     HEAD MRA:   1.  No aneurysm, high flow AVM or significant stenosis identified.  2.  Variant [place] anatomy as above.     NECK MRA:  1.  Evaluation degraded by suboptimal timing of the contrast bolus and significant venous contamination the gadolinium bolus MRA.  2.  No hemodynamically significant stenosis in the carotid circulation by NASCET criteria.  3.  Poor visualization of the left vertebral artery origin and proximal left V1 segment. This may be artifactual, but it is difficult to exclude high-grade stenosis in this region.      MRI 2021  IMPRESSION:  1.  No mass, hemorrhage, or recent infarct identified.  2.  Diffusion abnormality involving the right ventral diomedes seen on 01/13/2021 is no longer identified. No definite residual signal abnormality in this location to confirm prior infarct. This may relate to small size and slice selection.  3.  Inflammatory changes of the paranasal sinuses including bilateral maxillary air-fluid levels. Correlate with clinical evidence for sinusitis.      RELEVANT LABS  Component      Latest Ref Rng & Units 11/30/2022 2/5/2023   WBC      4.0 - 11.0 10e3/uL  9.3   RBC Count      3.80 - 5.20 10e6/uL  4.59   Hemoglobin      11.7 - 15.7 g/dL  11.8   Hematocrit      35.0 - 47.0 %  38.1   MCV      78 - 100 fL  83   MCH      26.5 - 33.0 pg  25.7 (L)   MCHC      31.5 - 36.5 g/dL  31.0 (L)   RDW      10.0 - 15.0 %  15.1 (H)   Platelet Count      150 - 450 10e3/uL  228   % Neutrophils      %  69   % Lymphocytes      %  14   % Monocytes      %  6   % Eosinophils      %  11   % Basophils      %  0   % Immature Granulocytes      %  0   NRBCs  per 100 WBC      <1 /100  0   Absolute Neutrophils      1.6 - 8.3 10e3/uL  6.4   Absolute Lymphocytes      0.8 - 5.3 10e3/uL  1.3   Absolute Monocytes      0.0 - 1.3 10e3/uL  0.6   Absolute Eosinophils      0.0 - 0.7 10e3/uL  1.0 (H)   Absolute Basophils      0.0 - 0.2 10e3/uL  0.0   Absolute Immature Granulocytes      <=0.4 10e3/uL  0.0   Absolute NRBCs      10e3/uL  0.0   Sodium      136 - 145 mmol/L  138   Potassium      3.4 - 5.3 mmol/L  4.2   Chloride      98 - 107 mmol/L  102   Carbon Dioxide (CO2)      22 - 29 mmol/L  25   Anion Gap      7 - 15 mmol/L  11   Urea Nitrogen      6.0 - 20.0 mg/dL  10.6   Creatinine      0.51 - 0.95 mg/dL  0.58   Calcium      8.6 - 10.0 mg/dL  9.5   Glucose      70 - 99 mg/dL  156 (H)   GFR Estimate      >60 mL/min/1.73m2  >90   Hemoglobin A1C      0.0 - 5.6 % 8.6 (H)      OUTSIDE RECORDS  Outside referral notes and chart notes were reviewed and pertinent information has been summarized (in addition to the HPI):- Dr. Multani notes reviewed      ER notes above reviewed    Component      Latest Ref Rng 9/14/2023  9:21 AM 10/1/2023  9:42 AM   Sodium      135 - 145 mmol/L  136    Potassium      3.4 - 5.3 mmol/L  4.3    Chloride      98 - 107 mmol/L  102    Carbon Dioxide (CO2)      22 - 29 mmol/L  22    Anion Gap      7 - 15 mmol/L  12    Urea Nitrogen      6.0 - 20.0 mg/dL  9.2    Creatinine      0.51 - 0.95 mg/dL  0.53    GFR Estimate      >60 mL/min/1.73m2  >90    Calcium      8.6 - 10.0 mg/dL  9.1    Glucose      70 - 99 mg/dL  201 (H)    WBC      4.0 - 11.0 10e3/uL  7.2    RBC Count      3.80 - 5.20 10e6/uL  4.73    Hemoglobin      11.7 - 15.7 g/dL  11.6 (L)    Hematocrit      35.0 - 47.0 %  38.4    MCV      78 - 100 fL  81    MCH      26.5 - 33.0 pg  24.5 (L)    MCHC      31.5 - 36.5 g/dL  30.2 (L)    RDW      10.0 - 15.0 %  15.7 (H)    Platelet Count      150 - 450 10e3/uL  230    CRP Inflammation      <5.00 mg/L 3.20        Legend:  (H) High  (L) Low    LABS  Component       Latest Ref Rng 11/7/2023  10:46 AM   Sodium      135 - 145 mmol/L 138    Potassium      3.4 - 5.3 mmol/L 4.0    Carbon Dioxide (CO2)      22 - 29 mmol/L 25    Anion Gap      7 - 15 mmol/L 13    Urea Nitrogen      6.0 - 20.0 mg/dL 13.3    Creatinine      0.51 - 0.95 mg/dL 0.59    GFR Estimate      >60 mL/min/1.73m2 >90    Calcium      8.6 - 10.0 mg/dL 9.3    Chloride      98 - 107 mmol/L 100    Glucose      70 - 99 mg/dL 184 (H)    Alkaline Phosphatase      35 - 104 U/L 116 (H)    AST      0 - 45 U/L 48 (H)    ALT      0 - 50 U/L 41    Protein Total      6.4 - 8.3 g/dL 7.1    Albumin      3.5 - 5.2 g/dL 4.1    Bilirubin Total      <=1.2 mg/dL 0.3    KATY-1 (Histidyl-tRNA Synthetase) Bere IgG      0 - 40 AU/mL 1    PL-12 (alanyl-tRNA synthetase) Antibody      Negative  Negative    PL-7 (threonyl-tRNA synthetase) Antibody      Negative  Negative    EJ (glycyl - tRNA synthetase) Antibody      Negative  Negative    OJ (isoleucyl-tRNA synthetase) Antibody      Negative  Negative    SRP (Signal Recognition Particle) Antibody      Negative  Negative    Mi-2 (nuclear helicase protein) Antibody      Negative  Negative    P155/140 (TIF1-gamma) Antibody      Negative  Negative    TIF-1 Gamma Antibody      Negative  Negative    SAE1 (SUMO activating enzyme) Bere      Negative  Negative    MDA5 (CADM 140) Bere      Negative  Negative    NXP-2 (Nuclear matrix protein 2) Bere      Negative  Negative    Myositis Interp See Note    Albumin Fraction      3.7 - 5.1 g/dL 3.8    Alpha 1 Fraction      0.2 - 0.4 g/dL 0.3    Alpha 2 Fraction      0.5 - 0.9 g/dL 0.7    Beta Fraction      0.6 - 1.0 g/dL 0.9    Gamma Fraction      0.7 - 1.6 g/dL 1.0    Monoclonal Peak      <=0.0 g/dL 0.0    ELP Interpretation: Essentially normal electrophoretic pattern. No obvious monoclonal proteins seen. Pathologic significance requires clinical correlation. Marlene Zamudio M.D., Ph.D.    Vitamin B12      232 - 1,245 pg/mL 602    Vitamin B1 Whole Blood Level      70 -  180 nmol/L 111    TSH      0.30 - 4.20 uIU/mL 0.35    Hemoglobin A1C      0.0 - 5.6 % 11.1 (H)    Magnesium      1.7 - 2.3 mg/dL 1.7    CK Total      26 - 192 U/L 59    Ferritin      11 - 328 ng/mL 26    Immunofixation ELP No monoclonal protein seen on immunofixation. Pathologic significance requires clinical correlation. Marlene Zamudio M.D., Ph.D.    Total Protein Serum for ELP      6.4 - 8.3 g/dL 6.7       Legend:  (H) High  ER notes reviewed.    CLINICAL INTERPRETATION:  This is an abnormal nerve conduction and EMG study.  The needle study shows increased spontaneous activity in bilateral L4/L5/S1 muscle groups.  This can be seen in patients with lumbar radiculopathies.  Further clinical correlation is needed.     MRI L spine 2021-images reviewed.  IMPRESSION:   1.  Small left paracentral L5-S1 disc protrusion. Mild left L5-S1 foraminal stenosis.     2.  Minor degenerative disc changes at L4-L5.     MRI L spine-images reviewed  IMPRESSION:  1.  Moderate lumbar spondylosis at L4-L5 and L5-S1.  2.  No high-grade lumbar spinal canal narrowing.  3.  At L5-S1, there is mild left neural foraminal stenosis.    MRI C spine-images reviewed  IMPRESSION:  1.  Mild cervical spondylosis without significant cervical spinal canal narrowing.  2.  Mild left neural foraminal stenosis at C4-C5.       IMPRESSION/REPORT/PLAN  Cerebrovascular accident of right pontine structure (H)  Medication overuse headache  Vertigo  Light headed  Intractable chronic migraine without aura and without status migrainosus  Bilateral leg weakness/muscle cramps versus knee pain  Left leg weakness  Low ferritin  Lumbar spinal stenosis    This is a 54 year old female history of right pontine stroke thought to be due to vascular risk factors.  Currently she is on aspirin for stroke prevention.  Further management of vascular risk factors per primary care.  No new stroke symptoms.  Stable.    She does complain of vertigo and most likely this is related to her  stroke.  Meclizine is currently helping and should continue.  She is tried vestibular rehab without any benefit.  Could be related to headaches also.  There might not be any further treatment options available for this.  No change.  Stable.    In terms of her headaches these are most suggestive of medication overuse headaches since she is overusing Tylenol.  Encourage her not to use the Tylenol more than 2-3 times per week; reemphasized this today.  With cutting down on the Tylenol there has been some improvement.  Remaining headaches would be classified as migrainous headaches.    Amitriptyline at higher doses is also helping.  Topamax was not effective/caused side effects.  Emgality is now also helping.  Continue Tylenol for abortive therapy.      She does complain of some lightheadedness and orthostatic vitals were negative.  Most likely this is unrelated to the stroke.  Further management per primary care.  Encourage good hydration.  No change.  She is drinking plenty of water.  Monitor.    She does complain of new bilateral leg weakness/muscle cramps.  On exam she has generalized weakness related to her muscle pain.  Increased left leg weakness on exam today though this might be more knee pain than muscle pain.  EMG suggested a lumbar radiculopathy.  MRI L-spine shows moderate spinal stenosis which could be affecting the leg weakness/cramps.  Tizanidine is helping with the leg cramps and will continue.  She is already doing physical therapy.  MRI C-spine was noncontributory.  Her ferritin was low and iron replacement is also helping. Blood work/EMG was negative for myopathy.    For the left knee pain previously she was referred to orthopedics.    Return back in 6 months.      -     amitriptyline (ELAVIL) 50 MG tablet; Take 1 tablet (50 mg) by mouth At Bedtime  -     galcanezumab-gnlm (EMGALITY) 120 MG/ML injection; Inject 1 mL (120 mg) Subcutaneous every 28 days  -     tiZANidine (ZANAFLEX) 2 MG tablet; Take 1  tablet (2 mg) by mouth 3 times daily as needed for muscle spasms  -     ferrous sulfate (FEROSUL) 325 (65 Fe) MG tablet; Take 1 tablet (325 mg) by mouth daily (with breakfast)  - Continue physical therapy for lumbar spinal stenosis    Return in about 6 months (around 9/18/2024) for In-Clinic Visit (must).    Over 40 minutes were spent coordinating the care for the patient on the day of the encounter.  This includes previsit, during visit and post visit activities as documented above.  Counseling patient.  Multiple problems reviewed/addressed.  Prescription management.  Use of  requires extra time.  Reviewing MRI images.  (Activities include but not inclusive of reviewing chart, reviewing outside records, reviewing labs and imaging study results as well as the images, patient visit time including getting history and exam,  use if applicable, review of test results with the patient and coming up with a plan in a shared model, counseling patient and family, education and answering patient questions, EMR , EMR diagnosis entry and problem list management, medication reconciliation and prescription management if applicable, paperwork if applicable, printing documents and documentation of the visit activities.)        Guerrero Prescott MD  Neurologist  [facility]  Tel:- [phone]    This note was dictated using voice recognition software.  Any grammatical or context distortions are unintentional and inherent to the software.      Again, thank you for allowing me to participate in the care of your patient.        Sincerely,        Guerrero Prescott MD

## 2024-03-18 NOTE — PROGRESS NOTES
NEUROLOGY OUTPATIENT PROGRESS NOTE  Mar 18, 2024     CHIEF COMPLAINT/REASON FOR VISIT/REASON FOR CONSULT  Patient presents with:  Follow Up: Symptoms are about the same.   Medications are somewhat a relief.   Discuss botox    REASON FOR CONSULTATION- Multiple issues    REFERRAL SOURCE  Dr. Octavio Brady  CC Dr. Referred Self    HISTORY OF PRESENT ILLNESS  Kulwant Amin is a 54 year old female seen today for evaluation of multiple issues.    In 2021 she was found to have a right pontine stroke.  Presenting symptoms of vertigo.  No clear etiology for the stroke was identified it was thought to be lacunar.  She does have a history of hypertension, hyperlipidemia, diabetes.  She was supposed to be on Plavix though currently is only on aspirin.  No recurrence of stroke symptoms.  She is presented to the ER since then with vertigo symptoms and her imaging studies have been negative    1.  She reports that the vertigo is there all the time.  She is using meclizine which she finds some benefit with. She has done vestibular rehab without any improvement.    2.  Further complains of headaches.  These are around-the-clock.  He is taking Tylenol every 8 hours to get rid of the headaches.  Headaches are more frontal.  They can be throbbing in nature with photophobia and phonophobia.  She does continue visual auras as well.  She is on amitriptyline at nighttime.  She feels that it might be helping with the headaches.  Does not get good sleep due to COPD.  Headaches started after her stroke.    3.  No other new strokelike symptoms.    4.  Does complain of some lightheadedness especially during the exam today.  Has been put on hydrochlorothiazide by her primary care provider.    5/24/23  Patient returns today  1.  No stroke symptoms.  Is tolerating her stroke medications  2.  Continues to have continuous headache 24/7.  Occasionally it will go away but then come back.  Is still overusing Tylenol.  Generally uses 1 tablet a day but  can use up to 3 tablets a day.  Amitriptyline did help with the headaches and now they are not getting as severe as compared to before.  3.  Still feels dizzy.  Drinking plenty of water.    8/10/23  Patient returns today  1.  No further strokelike symptoms.  Continues to complain of some dizziness.  2.  Headaches have improved with use of the amitriptyline.  She continues to have a very mild continuous headache all the time.  She is only using the Tylenol 1-2 times a week.  The more severe headaches are only 1-2 times a week as well.  Amitriptyline does help without side effects though she would like to try other medications  3.  Drinks plenty of water.  No history of kidney stones.    10/26/23  Patient returns to the  1.  No further strokelike symptoms.  2.  Continues to have headaches every day.  Some associated dizziness.  She did try the Topamax and that was thought to be causing muscle cramps and as a result she stopped it.  No benefit with the headaches.  3.  Muscle cramps in the legs are still present.  When asked where she has pain in her legs she describes all over with stretching her knee and she might have knee pain.  She also reports bilateral leg weakness.  Does have chronic back pain that has not really worsened.  Some neck stiffness as well.  No other new neurological symptoms.    1/16/24  Patient returns today.  1.  No further strokelike symptoms.  Has had been having a lot of left leg weakness.  Was seen in the ER and put on some steroids.  This is not really helped.  There is pain and spasms in the left leg along with weakness.  Continues to have back pain.  Has not been able to do the MRIs  2.  Headaches are better and she is having 5 headaches a month.  Emgality does help with the headaches.  Tylenol does help with the headaches and also prevents headaches for few days.  3.  No further symptoms.    3/18/24  Patient demonstrated  1.  No further strokelike symptoms  2.  Continues to have weakness  in her legs though muscle cramps and spasms are much better with use of tizanidine.  Is working with physical therapy which is also helping.  3.  Continues to stay on the Emgality and the amitriptyline.  Headaches are 2-3 times a week but much less severe compared to before.  No side effects with the medication.  Headaches are much more tolerable and does not want more medications  No other new concerns.    Previous history is reviewed and this is unchanged.    PAST MEDICAL/SURGICAL HISTORY  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    Microcytic anemia 11/17/2019    S/P coronary artery stent placement 02/02/2018    TB lung, latent     9 mos INH     Patient Active Problem List   Diagnosis    Pulmonary nodule, right    Environmental allergies    TB lung, latent    COPD (chronic obstructive pulmonary disease)/Asthma     Essential hypertension    Cataracts, bilateral    Corneal opacity    Irregular astigmatism    Anxiety    Chronic nonintractable headache, unspecified headache type    Coronary artery disease due to lipid rich plaque    Dizziness    Hyperlipidemia    Moderate major depression (H)    Moderate persistent asthma    Unspecified visual loss    GERD (gastroesophageal reflux disease)    Dental caries    H/O arterial ischemic stroke    Constipation    Dysphagia    Chronic abdominal pain    Insomnia    FLACO (obstructive sleep apnea)- mild (AHI 14)    Chest pain    Chest pain, unspecified type    Type 2  diabetes mellitus treated with insulin (H)   Significant for arthritis, diabetes, high blood pressure, hyperlipidemia, stroke    FAMILY HISTORY  Family History   Problem Relation Age of Onset    Other - See Comments Mother          of an intestinal problem    Ulcers Father          of gastritis    Breast Cancer No family hx of     Ovarian Cancer No family hx of     Colon Cancer No family hx of    Negative for stroke    SOCIAL HISTORY  Social History     Tobacco Use    Smoking status: Former     Packs/day: 1.00     Years: 30.00     Additional pack years: 0.00     Total pack years: 30.00     Types: Cigarettes     Quit date: 2003     Years since quittin.2     Passive exposure: Never    Smokeless tobacco: Never    Tobacco comments:     No passive exposure   Vaping Use    Vaping Use: Never used   Substance Use Topics    Alcohol use: No    Drug use: No       SYSTEMS REVIEW  Twelve-system ROS was done and other than the HPI this was negative except for back pain, joint pain, numbness and tingling, weakness/paralysis, difficulty walking, balance/coordination problems, dizziness, headaches, anxiety, cardiac/heart problems, respiratory problems, snoring loudly.  No new symptoms/issues.    MEDICATIONS  acetaminophen (TYLENOL) 500 MG tablet, TAKE 1 PILL BY MOUTH EVERY 6 HOURS AS NEEDED FOR PAIN  albuterol (PROAIR HFA/PROVENTIL HFA/VENTOLIN HFA) 108 (90 Base) MCG/ACT inhaler, Inhale 2 puffs into the lungs every 4 hours as needed for shortness of breath  albuterol (PROVENTIL) (2.5 MG/3ML) 0.083% neb solution, Take 1 vial (2.5 mg) by nebulization every 6 hours as needed for shortness of breath, wheezing or cough  ASPIRIN LOW DOSE 81 MG EC tablet, TAKE 1 TABLET (81 MG TOTAL) BY MOUTH DAILY.  atorvastatin (LIPITOR) 80 MG tablet, Take 1 tablet (80 mg) by mouth daily  azithromycin (ZITHROMAX) 250 MG tablet, Take 2 tablets (500 mg) by mouth daily for 1 day, THEN 1 tablet (250 mg) daily for 4 days.  benzonatate  (TESSALON) 100 MG capsule, Take 1 capsule (100 mg) by mouth 3 times daily as needed for cough  colchicine (COLCRYS) 0.6 MG tablet, Take 1 tablet (0.6 mg) by mouth daily  Continuous Blood Gluc Sensor (FREESTYLE MONICA 2 SENSOR) [facility] [place], 1 EACH EVERY 14 DAYS USE 1 SENSOR EVERY 14 DAYS. USE TO READ BLOOD SUGARS PER 'S INSTRUCTIONS.  diclofenac (VOLTAREN) 1 % topical gel, Apply 4 g topically 4 times daily  docusate sodium (COLACE) 100 MG capsule, Take 2 capsules (200 mg) by mouth 2 times daily as needed for constipation  fluticasone-vilanterol (BREO ELLIPTA) 200-25 MCG/ACT inhaler, Inhale 1 puff into the lungs daily  gabapentin (NEURONTIN) 600 MG tablet, TAKE 1 PILL (600 MG) BY MOUTH 3 TIMES DAILY  hydrochlorothiazide (MICROZIDE) 12.5 MG capsule, Take 1 capsule (12.5 mg) by mouth every morning  insulin aspart (NOVOLOG PEN) 100 UNIT/ML pen, Inject 20 Units Subcutaneous 2 times daily (before meals)  insulin glargine (LANTUS PEN) 100 UNIT/ML pen, Inject 44 Units Subcutaneous every morning  insulin pen needle (32G X 4 MM) 32G X 4 MM miscellaneous, Use 3 pen needles daily or as directed.  Lidocaine (LIDOCARE) 4 % Patch, Place 1 patch onto the skin every 24 hours To prevent lidocaine toxicity, patient should be patch free for 12 hrs daily.  loratadine (CLARITIN) 10 MG tablet, Take 10 mg by mouth daily  meclizine (ANTIVERT) 12.5 MG tablet, TAKE 2 TABLETS (25 MG) BY MOUTH 3 TIMES DAILY AS NEEDED FOR DIZZINESS  metFORMIN (GLUCOPHAGE XR) 500 MG 24 hr tablet, Take 2 tablets (1,000 mg) by mouth 2 times daily (with meals)  metoprolol tartrate (LOPRESSOR) 25 MG tablet, TAKE 1 TABLET (25 MG TOTAL) BY MOUTH 2 (TWO) TIMES A DAY FOR BLOOD PRESSURE  montelukast (SINGULAIR) 10 MG tablet, Take 1 tablet (10 mg) by mouth at bedtime  omeprazole (PRILOSEC) 40 MG DR capsule, Take 1 capsule (40 mg) by mouth daily  ondansetron (ZOFRAN ODT) 4 MG ODT tab, Take 1-2 tablets (4-8 mg) by mouth every 8 hours as needed for nausea or  vomiting  predniSONE (DELTASONE) 20 MG tablet, Take 4 tabs daily for 4 days, then 3 tabs daily for 4 days, then 2 tabs daily for 4 days, then one tab daily for 4 days and stop.  predniSONE (DELTASONE) 20 MG tablet, Take 3 tabs by mouth daily x 3 days, then 2 tabs daily x 3 days, then 1 tab daily x 3 days, then 1/2 tab daily x 3 days.  sucralfate (CARAFATE) 1 GM/10ML suspension, Take 10 mLs (1 g) by mouth 4 times daily  tiotropium (SPIRIVA RESPIMAT) 2.5 MCG/ACT inhaler, INHALE 2 PUFFS INTO THE LUNGS DAILY  predniSONE (DELTASONE) 20 MG tablet, Take 2 tabs daily x 7 days (Patient not taking: Reported on 3/8/2024)    tezepelumab-ekko (TEZSPIRE) injection 210 mg         PHYSICAL EXAMINATION  VITALS: /75   Pulse 79   Wt 59.4 kg (131 lb)   BMI 25.06 kg/m    GENERAL: Healthy appearing, alert, no acute distress, normal habitus.  CARDIOVASCULAR: Extremities warm and well perfused. Pulses present.   NEUROLOGICAL:  Patient is awake and oriented to self, place and time.  Attention span is normal.  Memory is grossly intact.  Language is fluent and follows commands appropriately.  Appropriate fund of knowledge. Cranial nerves 2-12 are intact. There is no pronator drift.  Motor exam shows  4/5 strength in all extremities-effort dependent.  Generalized weakness due to pain all over.  Tone is symmetric bilaterally in upper and lower extremities.  Previously reflexes are symmetric and 1+ in upper extremities and lower extremities. Sensory exam is grossly intact to light touch, pin prick and vibration.  Finger to nose and heel to shin is without dysmetria.  Romberg is negative.  Gait is normal and the patient is able to do tandem walk and walk on toes and heels with mild difficulty.  Orthostatic vitals    Exam shows some worsening left hip flexion weakness related to pain.  Possibly this is more knee pain than neurological pain.  Exam stable.  Continues to have weakness in the legs.    DIAGNOSTICS  MRI 2022  IMPRESSION:  1.   No acute intracranial abnormalities identified.  2.  Minor chronic small vessel ischemic change, otherwise unremarkable contrast-enhanced MRI of the brain.    MRA                                                               IMPRESSION:  1.  Normal MRA [place].      MRA neck  IMPRESSION:  1.  Normal neck MRA.    Cardiac event monitor    ECHo  Left ventricular size, wall motion and function are normal. The ejection  fraction is 55-60%.  There is borderline anterior, septal, and apical wall hypokinesis.  Normal right ventricle size and systolic function.  No hemodynamically significant valvular abnormalities on 2D or color flow  Imaging.      CTA 2021  HEAD CT:  1.  No evidence of acute hemorrhage, mass effect, or acute vascular territorial infarction.  2.  Severe left sphenoid sinus disease.     HEAD CTA:   1.  No evidence of proximal large vessel occlusion or flow-limiting stenosis.  2.  Dorsally directed 2 mm contour irregularity arising from the distal left supraclinoid ICA may represent a tiny posterior communicating artery aneurysm.     NECK CTA:  1.  No hemodynamically significant stenosis based on NASCET criteria.  2.  Mild left V1 segment stenosis.  3.  No evidence for dissection.      MRI 2020  IMPRESSION:  MRI BRAIN:  1. No finding for acute infarct, hemorrhage, or mass.  2. There are a few very small foci of FLAIR hyperintensity within the cerebral white matter, nonspecific but compatible with small areas of gliosis and/or chronic myelin loss.     MRA [place]:  1. Congenital variation of the [place] without vascular cutoff, aneurysm, or flow-limiting stenosis.      MRI 2021  HEAD MRI:   1.  Tiny linear focus of diffusion restriction within the right dorsal diomedes suspicious for acute small vessel infarct. This is new compared to 12/07/2020.  2.  No hemorrhagic transformation or mass effect. No additional infarct identified.  3.  Mild presumed microvascular ischemic change within  the cerebral white matter.     HEAD MRA:   1.  No aneurysm, high flow AVM or significant stenosis identified.  2.  Variant [place] anatomy as above.     NECK MRA:  1.  Evaluation degraded by suboptimal timing of the contrast bolus and significant venous contamination the gadolinium bolus MRA.  2.  No hemodynamically significant stenosis in the carotid circulation by NASCET criteria.  3.  Poor visualization of the left vertebral artery origin and proximal left V1 segment. This may be artifactual, but it is difficult to exclude high-grade stenosis in this region.      MRI 2021  IMPRESSION:  1.  No mass, hemorrhage, or recent infarct identified.  2.  Diffusion abnormality involving the right ventral diomedes seen on 01/13/2021 is no longer identified. No definite residual signal abnormality in this location to confirm prior infarct. This may relate to small size and slice selection.  3.  Inflammatory changes of the paranasal sinuses including bilateral maxillary air-fluid levels. Correlate with clinical evidence for sinusitis.      RELEVANT LABS  Component      Latest Ref Rng & Units 11/30/2022 2/5/2023   WBC      4.0 - 11.0 10e3/uL  9.3   RBC Count      3.80 - 5.20 10e6/uL  4.59   Hemoglobin      11.7 - 15.7 g/dL  11.8   Hematocrit      35.0 - 47.0 %  38.1   MCV      78 - 100 fL  83   MCH      26.5 - 33.0 pg  25.7 (L)   MCHC      31.5 - 36.5 g/dL  31.0 (L)   RDW      10.0 - 15.0 %  15.1 (H)   Platelet Count      150 - 450 10e3/uL  228   % Neutrophils      %  69   % Lymphocytes      %  14   % Monocytes      %  6   % Eosinophils      %  11   % Basophils      %  0   % Immature Granulocytes      %  0   NRBCs per 100 WBC      <1 /100  0   Absolute Neutrophils      1.6 - 8.3 10e3/uL  6.4   Absolute Lymphocytes      0.8 - 5.3 10e3/uL  1.3   Absolute Monocytes      0.0 - 1.3 10e3/uL  0.6   Absolute Eosinophils      0.0 - 0.7 10e3/uL  1.0 (H)   Absolute Basophils      0.0 - 0.2 10e3/uL  0.0   Absolute Immature  Granulocytes      <=0.4 10e3/uL  0.0   Absolute NRBCs      10e3/uL  0.0   Sodium      136 - 145 mmol/L  138   Potassium      3.4 - 5.3 mmol/L  4.2   Chloride      98 - 107 mmol/L  102   Carbon Dioxide (CO2)      22 - 29 mmol/L  25   Anion Gap      7 - 15 mmol/L  11   Urea Nitrogen      6.0 - 20.0 mg/dL  10.6   Creatinine      0.51 - 0.95 mg/dL  0.58   Calcium      8.6 - 10.0 mg/dL  9.5   Glucose      70 - 99 mg/dL  156 (H)   GFR Estimate      >60 mL/min/1.73m2  >90   Hemoglobin A1C      0.0 - 5.6 % 8.6 (H)      OUTSIDE RECORDS  Outside referral notes and chart notes were reviewed and pertinent information has been summarized (in addition to the HPI):- Dr. Multani notes reviewed      ER notes above reviewed    Component      Latest Ref Rng 9/14/2023  9:21 AM 10/1/2023  9:42 AM   Sodium      135 - 145 mmol/L  136    Potassium      3.4 - 5.3 mmol/L  4.3    Chloride      98 - 107 mmol/L  102    Carbon Dioxide (CO2)      22 - 29 mmol/L  22    Anion Gap      7 - 15 mmol/L  12    Urea Nitrogen      6.0 - 20.0 mg/dL  9.2    Creatinine      0.51 - 0.95 mg/dL  0.53    GFR Estimate      >60 mL/min/1.73m2  >90    Calcium      8.6 - 10.0 mg/dL  9.1    Glucose      70 - 99 mg/dL  201 (H)    WBC      4.0 - 11.0 10e3/uL  7.2    RBC Count      3.80 - 5.20 10e6/uL  4.73    Hemoglobin      11.7 - 15.7 g/dL  11.6 (L)    Hematocrit      35.0 - 47.0 %  38.4    MCV      78 - 100 fL  81    MCH      26.5 - 33.0 pg  24.5 (L)    MCHC      31.5 - 36.5 g/dL  30.2 (L)    RDW      10.0 - 15.0 %  15.7 (H)    Platelet Count      150 - 450 10e3/uL  230    CRP Inflammation      <5.00 mg/L 3.20        Legend:  (H) High  (L) Low    LABS  Component      Latest Ref Rng 11/7/2023  10:46 AM   Sodium      135 - 145 mmol/L 138    Potassium      3.4 - 5.3 mmol/L 4.0    Carbon Dioxide (CO2)      22 - 29 mmol/L 25    Anion Gap      7 - 15 mmol/L 13    Urea Nitrogen      6.0 - 20.0 mg/dL 13.3    Creatinine      0.51 - 0.95 mg/dL 0.59    GFR Estimate      >60  mL/min/1.73m2 >90    Calcium      8.6 - 10.0 mg/dL 9.3    Chloride      98 - 107 mmol/L 100    Glucose      70 - 99 mg/dL 184 (H)    Alkaline Phosphatase      35 - 104 U/L 116 (H)    AST      0 - 45 U/L 48 (H)    ALT      0 - 50 U/L 41    Protein Total      6.4 - 8.3 g/dL 7.1    Albumin      3.5 - 5.2 g/dL 4.1    Bilirubin Total      <=1.2 mg/dL 0.3    KATY-1 (Histidyl-tRNA Synthetase) Bere IgG      0 - 40 AU/mL 1    PL-12 (alanyl-tRNA synthetase) Antibody      Negative  Negative    PL-7 (threonyl-tRNA synthetase) Antibody      Negative  Negative    EJ (glycyl - tRNA synthetase) Antibody      Negative  Negative    OJ (isoleucyl-tRNA synthetase) Antibody      Negative  Negative    SRP (Signal Recognition Particle) Antibody      Negative  Negative    Mi-2 (nuclear helicase protein) Antibody      Negative  Negative    P155/140 (TIF1-gamma) Antibody      Negative  Negative    TIF-1 Gamma Antibody      Negative  Negative    SAE1 (SUMO activating enzyme) Bere      Negative  Negative    MDA5 (CADM 140) Bere      Negative  Negative    NXP-2 (Nuclear matrix protein 2) Bere      Negative  Negative    Myositis Interp See Note    Albumin Fraction      3.7 - 5.1 g/dL 3.8    Alpha 1 Fraction      0.2 - 0.4 g/dL 0.3    Alpha 2 Fraction      0.5 - 0.9 g/dL 0.7    Beta Fraction      0.6 - 1.0 g/dL 0.9    Gamma Fraction      0.7 - 1.6 g/dL 1.0    Monoclonal Peak      <=0.0 g/dL 0.0    ELP Interpretation: Essentially normal electrophoretic pattern. No obvious monoclonal proteins seen. Pathologic significance requires clinical correlation. Marlene Zamudio M.D., Ph.D.    Vitamin B12      232 - 1,245 pg/mL 602    Vitamin B1 Whole Blood Level      70 - 180 nmol/L 111    TSH      0.30 - 4.20 uIU/mL 0.35    Hemoglobin A1C      0.0 - 5.6 % 11.1 (H)    Magnesium      1.7 - 2.3 mg/dL 1.7    CK Total      26 - 192 U/L 59    Ferritin      11 - 328 ng/mL 26    Immunofixation ELP No monoclonal protein seen on immunofixation. Pathologic significance requires  clinical correlation. Marlene Zamudio M.D., Ph.D.    Total Protein Serum for ELP      6.4 - 8.3 g/dL 6.7       Legend:  (H) High  ER notes reviewed.    CLINICAL INTERPRETATION:  This is an abnormal nerve conduction and EMG study.  The needle study shows increased spontaneous activity in bilateral L4/L5/S1 muscle groups.  This can be seen in patients with lumbar radiculopathies.  Further clinical correlation is needed.     MRI L spine 2021-images reviewed.  IMPRESSION:   1.  Small left paracentral L5-S1 disc protrusion. Mild left L5-S1 foraminal stenosis.     2.  Minor degenerative disc changes at L4-L5.     MRI L spine-images reviewed  IMPRESSION:  1.  Moderate lumbar spondylosis at L4-L5 and L5-S1.  2.  No high-grade lumbar spinal canal narrowing.  3.  At L5-S1, there is mild left neural foraminal stenosis.    MRI C spine-images reviewed  IMPRESSION:  1.  Mild cervical spondylosis without significant cervical spinal canal narrowing.  2.  Mild left neural foraminal stenosis at C4-C5.       IMPRESSION/REPORT/PLAN  Cerebrovascular accident of right pontine structure (H)  Medication overuse headache  Vertigo  Light headed  Intractable chronic migraine without aura and without status migrainosus  Bilateral leg weakness/muscle cramps versus knee pain  Left leg weakness  Low ferritin  Lumbar spinal stenosis    This is a 54 year old female history of right pontine stroke thought to be due to vascular risk factors.  Currently she is on aspirin for stroke prevention.  Further management of vascular risk factors per primary care.  No new stroke symptoms.  Stable.    She does complain of vertigo and most likely this is related to her stroke.  Meclizine is currently helping and should continue.  She is tried vestibular rehab without any benefit.  Could be related to headaches also.  There might not be any further treatment options available for this.  No change.  Stable.    In terms of her headaches these are most suggestive of  medication overuse headaches since she is overusing Tylenol.  Encourage her not to use the Tylenol more than 2-3 times per week; reemphasized this today.  With cutting down on the Tylenol there has been some improvement.  Remaining headaches would be classified as migrainous headaches.    Amitriptyline at higher doses is also helping.  Topamax was not effective/caused side effects.  Emgality is now also helping.  Continue Tylenol for abortive therapy.      She does complain of some lightheadedness and orthostatic vitals were negative.  Most likely this is unrelated to the stroke.  Further management per primary care.  Encourage good hydration.  No change.  She is drinking plenty of water.  Monitor.    She does complain of new bilateral leg weakness/muscle cramps.  On exam she has generalized weakness related to her muscle pain.  Increased left leg weakness on exam today though this might be more knee pain than muscle pain.  EMG suggested a lumbar radiculopathy.  MRI L-spine shows moderate spinal stenosis which could be affecting the leg weakness/cramps.  Tizanidine is helping with the leg cramps and will continue.  She is already doing physical therapy.  MRI C-spine was noncontributory.  Her ferritin was low and iron replacement is also helping. Blood work/EMG was negative for myopathy.    For the left knee pain previously she was referred to orthopedics.    Return back in 6 months.      -     amitriptyline (ELAVIL) 50 MG tablet; Take 1 tablet (50 mg) by mouth At Bedtime  -     galcanezumab-gnlm (EMGALITY) 120 MG/ML injection; Inject 1 mL (120 mg) Subcutaneous every 28 days  -     tiZANidine (ZANAFLEX) 2 MG tablet; Take 1 tablet (2 mg) by mouth 3 times daily as needed for muscle spasms  -     ferrous sulfate (FEROSUL) 325 (65 Fe) MG tablet; Take 1 tablet (325 mg) by mouth daily (with breakfast)  - Continue physical therapy for lumbar spinal stenosis    Return in about 6 months (around 9/18/2024) for In-Clinic Visit  (must).    Over 40 minutes were spent coordinating the care for the patient on the day of the encounter.  This includes previsit, during visit and post visit activities as documented above.  Counseling patient.  Multiple problems reviewed/addressed.  Prescription management.  Use of  requires extra time.  Reviewing MRI images.  (Activities include but not inclusive of reviewing chart, reviewing outside records, reviewing labs and imaging study results as well as the images, patient visit time including getting history and exam,  use if applicable, review of test results with the patient and coming up with a plan in a shared model, counseling patient and family, education and answering patient questions, EMR , EMR diagnosis entry and problem list management, medication reconciliation and prescription management if applicable, paperwork if applicable, printing documents and documentation of the visit activities.)        Guerrero Prescott MD  Neurologist  [facility]  Tel:- [phone]    This note was dictated using voice recognition software.  Any grammatical or context distortions are unintentional and inherent to the software.

## 2024-03-18 NOTE — NURSING NOTE
"Kulwant Amin is a 54 year old female who presents for:  Chief Complaint   Patient presents with    Follow Up     Symptoms are about the same.   Medications are somewhat a relief.   Discuss botox        Initial Vitals:  /75   Pulse 79   Wt 59.4 kg (131 lb)   BMI 25.06 kg/m   Estimated body mass index is 25.06 kg/m  as calculated from the following:    Height as of 3/14/24: 1.54 m (5' 0.63\").    Weight as of this encounter: 59.4 kg (131 lb).. Body surface area is 1.59 meters squared. BP completed using cuff size: wrist cuff    Jonathan V. Jerry  "

## 2024-03-21 DIAGNOSIS — Z53.9 DIAGNOSIS NOT YET DEFINED: Primary | ICD-10-CM

## 2024-03-21 PROCEDURE — G0179 MD RECERTIFICATION HHA PT: HCPCS | Performed by: FAMILY MEDICINE

## 2024-03-25 ENCOUNTER — OFFICE VISIT (OUTPATIENT)
Dept: PHARMACY | Facility: CLINIC | Age: 54
End: 2024-03-25
Payer: COMMERCIAL

## 2024-03-25 VITALS
DIASTOLIC BLOOD PRESSURE: 74 MMHG | WEIGHT: 128.7 LBS | OXYGEN SATURATION: 95 % | BODY MASS INDEX: 24.62 KG/M2 | HEART RATE: 102 BPM | SYSTOLIC BLOOD PRESSURE: 110 MMHG

## 2024-03-25 DIAGNOSIS — R51.9 CHRONIC NONINTRACTABLE HEADACHE, UNSPECIFIED HEADACHE TYPE: ICD-10-CM

## 2024-03-25 DIAGNOSIS — K21.9 GASTROESOPHAGEAL REFLUX DISEASE WITHOUT ESOPHAGITIS: ICD-10-CM

## 2024-03-25 DIAGNOSIS — K59.00 CONSTIPATION, UNSPECIFIED CONSTIPATION TYPE: ICD-10-CM

## 2024-03-25 DIAGNOSIS — M25.562 PAIN IN BOTH KNEES, UNSPECIFIED CHRONICITY: ICD-10-CM

## 2024-03-25 DIAGNOSIS — J45.50 SEVERE PERSISTENT ASTHMA, UNSPECIFIED WHETHER COMPLICATED (H): ICD-10-CM

## 2024-03-25 DIAGNOSIS — E11.9 TYPE 2 DIABETES MELLITUS TREATED WITH INSULIN (H): Primary | ICD-10-CM

## 2024-03-25 DIAGNOSIS — I31.9 PERICARDITIS, UNSPECIFIED CHRONICITY, UNSPECIFIED TYPE: ICD-10-CM

## 2024-03-25 DIAGNOSIS — K13.79 ORAL PAIN: ICD-10-CM

## 2024-03-25 DIAGNOSIS — G89.29 CHRONIC NONINTRACTABLE HEADACHE, UNSPECIFIED HEADACHE TYPE: ICD-10-CM

## 2024-03-25 DIAGNOSIS — Z79.4 TYPE 2 DIABETES MELLITUS TREATED WITH INSULIN (H): Primary | ICD-10-CM

## 2024-03-25 DIAGNOSIS — I10 ESSENTIAL HYPERTENSION: ICD-10-CM

## 2024-03-25 DIAGNOSIS — M25.561 PAIN IN BOTH KNEES, UNSPECIFIED CHRONICITY: ICD-10-CM

## 2024-03-25 DIAGNOSIS — E78.5 HYPERLIPIDEMIA, UNSPECIFIED HYPERLIPIDEMIA TYPE: ICD-10-CM

## 2024-03-25 DIAGNOSIS — F33.9 RECURRENT MAJOR DEPRESSIVE DISORDER, REMISSION STATUS UNSPECIFIED (H): ICD-10-CM

## 2024-03-25 PROCEDURE — 99606 MTMS BY PHARM EST 15 MIN: CPT | Performed by: PHARMACIST

## 2024-03-25 RX ORDER — MECLIZINE HCL 12.5 MG 12.5 MG/1
12.5 TABLET ORAL 3 TIMES DAILY PRN
COMMUNITY
End: 2024-06-10

## 2024-03-25 NOTE — PROGRESS NOTES
Medication Therapy Management (MTM) Encounter    ASSESSMENT:                            Medication Adherence/Access: No issues identified    1. Type 2 diabetes mellitus treated with insulin (H)  A1c not yet at goal <7%.  Unable to fully determine diabetes control without sufficient blood glucose data present today.  Encouraged patient to monitor blood sugars at least every 8 hours with CGM to avoid gaps in data, also provided manufacture phone number to contact in cases of sensor disruption.  For now, reasonable for patient to continue current doses without change, will assess need to adjust dose of insulins at next visit.    2. Essential hypertension  BP at goal <130/80 mmHg.  Although, given persistent episodes of dizziness, especially since recently adding tizanidine to beta-blocker use, could consider discontinuing diuretic and alternatively reassessing need for additional antihypertensive.  If needed, would recommend adding ACE/ARB at low-dose and slowly titrating.    3. Hyperlipidemia, unspecified hyperlipidemia type  Patient appropriately taking high intensity statin and LDL at goal.  Of note, patient mentions symptoms of bilateral leg pain which has been ongoing though patient not interested in adjusting medication today, could consider assessing for statin-induced myopathy in the future, will defer to PCP to further assess.     4. Severe persistent asthma, unspecified whether complicated (H28)  Managed by patient's pulmonologist and allergist.  Updated medication list to reflect what patient is currently using. Encouraged patient to continue follow up with PCP and specialist as scheduled.     5. Recurrent major depressive disorder, remission status unspecified (H24)  Recommended resuming fluoxetine as once daily medication (as previously prescribed) and eventually considering increasing dose as able, though patient prefers to discuss with PCP at next visit. Alternatively could consider switching to  alternative selective serotonin reuptake inhibitor in hopes of improved response to current therapy, will defer to PCP.     6. Pain in both knees, unspecified chronicity  Sent refill for voltaren gel, no other changes recommended.     7. Chronic nonintractable headache, unspecified headache type  Managed by patients neurologist.  No changes recommended.     8. Gastroesophageal reflux disease without esophagitis  Stable.     9. Constipation, unspecified constipation type  Stable.     10. Pericarditis, unspecified chronicity, unspecified type  Stable.    11. Oral pain  Defer to patients PCP, or recommended patient schedule sooner visit if needed.     PLAN:                            Directed patient to call Freestyle Gemma  if issues with Gemma 2 sensor: [phone].   Encouraged patient to schedule visit with physician if needing help with oral pain before PCP scheduled in 3 days  Sent refill to continue use of Voltaren gel    Medication issues to be addressed at a future visit (or with PCP):   Switch hydrochlorothiazide to ACE/ARB?  Resume fluoxetine or alternative?    Follow-up: Return in about 6 weeks (around 5/6/2024).  PCP 3/28; Pulm 4/25    SUBJECTIVE/OBJECTIVE:                          Kulwant Amin is a 54 year old female coming in for a follow-up visit. Patient was accompanied by daughter in law. Patient seen with ID# [id number] .      Reason for visit: MTM follow up.    Allergies/ADRs: Reviewed in chart  Past Medical History: Reviewed in chart  Tobacco: She reports that she quit smoking about 21 years ago. Her smoking use included cigarettes. She has a 30 pack-year smoking history. She has never been exposed to tobacco smoke. She has never used smokeless tobacco.  Alcohol: none    Medication Adherence/Access: Patient has a home health nurse that sets up a three times daily pillbox. Patient brings with her medication vials today but does NOT bring her pillbox. Reports no missed doses.      Diabetes   Metformin  mg 2 tablets twice daily with food  Lantus 44 units once daily at night  Novolog 16 units twice daily 10 minutes before meals (not using the 20 units as prescribed because her blood sugar improved)  Report no missed doses of insulin. Daughter in law helps patient with insulin administration.   Aspirin 81mg daily  Reports no medication side effects, still does have upset stomach at times, but overall tolerating without concern.   Blood sugar monitoring: Freestyle Gemma CGM; see below. Missing some data due to MRI and gemma had to be removed, wondering if this can be replaced.   Current diabetes symptoms: polyuria, polydipsia, fatigue, and vision changes  Diet/Exercise: Reports eating 2 meals per day, 11 am and 6:30-7, usually eats brown rice, beans, lentils, veggies.   Med Hx: No longer experiencing nausea/vomiting since switching metformin IR to ER.     Eye exam is up to date  Foot exam is up to date  Urine Albumin:   Lab Results   Component Value Date    UMALCR  11/07/2023      Comment:      Unable to calculate, urine albumin and/or urine creatinine is outside detectable limits.  Microalbuminuria is defined as an albumin:creatinine ratio of 17 to 299 for males and 25 to 299 for females. A ratio of albumin:creatinine of 300 or higher is indicative of overt proteinuria.  Due to biologic variability, positive results should be confirmed by a second, first-morning random or 24-hour timed urine specimen. If there is discrepancy, a third specimen is recommended. When 2 out of 3 results are in the microalbuminuria range, this is evidence for incipient nephropathy and warrants increased efforts at glucose control, blood pressure control, and institution of therapy with an angiotensin-converting-enzyme (ACE) inhibitor (if the patient can tolerate it).        Lab Results   Component Value Date    A1C 10.9 (H) 01/22/2024          Hypertension /CAD/SVT  Hydrochlorothiazide 12.5 mg  daily  Metoprolol tartrate 25 mg twice daily   Patient reports the following medication side effects: dizziness which has been the same as before. Reports feeling dizzy 1-2 times daily, still taking meclizine three times daily as needed (though didn't bring this today).   Patient self-monitors blood pressure, but not really checking lately.   Cardiology: Dr. Hirsch     BP Readings from Last 3 Encounters:   03/25/24 110/74   03/18/24 114/75   03/14/24 119/79     Pulse Readings from Last 3 Encounters:   03/25/24 102   03/18/24 79   03/14/24 93     Hyperlipidemia /Hx pontine stroke  Atorvastatin 80 mg daily  Aspirin 81 mg daily (per neurology)   Patient reports muscle pains bilaterally in her legs. States that this has been present for a long time and her doctors are already aware of this.   The ASCVD Risk score (Doris CANDELARIO, et al., 2019) failed to calculate for the following reasons:    The patient has a prior MI or stroke diagnosis     Recent Labs   Lab Test 03/14/23  1140 02/16/23  1137 03/25/22  1200   CHOL 125  --  145   HDL 41*  --  52   LDL 41 45 54   TRIG 214*  --  197*     Allergies/COPD/asthma/hypereosinophilia:   Prednisone 3/14 per PCP taper and azithromycin completed   Loratadine 10mg daily - notes purchasing OTC  Montelukast 10 mg daily at bedtime  Breo Ellipta 200/25mcg 1 puff daily at night  Spiriva 2.5 mcg 2 puffs daily at night  Albuterol HFA daily as needed   Albuterol nebs three times daily   States that breathing has been the same, still having shortness of breath and wheezing.   Pulmonologist: [facility], Tamra Mohr.   Allergy & immunology: [facility], Dr. Elizabeth Marie.     Pain  Gabapentin 600 mg three times daily   Tizanidine 2 mg three times daily prn - per juan for leg muscle spasms   Ferrous sulfate 325 daily - per juan for leg cramps  Diclofenac 1% gel 4g 4 times daily - requesting refill today  Thinks the leg pain is getting a bit better.      Mood  Fluoxetine 10 mg daily - brings with today, but states that Dr. Cardoza wants patient to stop this medication and stop it gradually. Was told to decrease weekly, right now taking 3 capsules this week on different days, states that next week she will switch to 2 caps per week and then 1 per week and then stop. DIL thinks that the medication should not be stopped because she is still having anxiety, same as before with the dose decreasing. Patient prefers to continue and discuss with PCP at next visit in 3 days.       10/5/2023    10:41 AM 12/5/2023     8:43 AM 2/15/2024    11:15 AM   PHQ   PHQ-9 Total Score 7 7 9   Q9: Thoughts of better off dead/self-harm past 2 weeks Not at all Not at all Not at all     Migraine/headache  Amitriptyline 50 mg daily at bedtime  Acetaminophen three times daily as needed for headaches, taking every day  Seeing neurologist, Dr. Prescott.   Emgality every 28 days- still getting migraines, reports still daily headaches    GERD  Omeprazole 40 mg daily in the morning  Sucralfate 1g 3 times daily (does not bring with today but still taking)  Tums 1 tablet twice daily PRN   Ondansetron - states they ran out of this.  Reports well controlled symptoms with current regimen.     Constipation  Docusate 100 mg 2 caps twice daily   Miralax 17 g daily  Thinks current therapy is helpful, no concerns.     Pericarditis    Colchicine 0.6 mg daily (started 7/24)  Reports improvement of chest pain.     Oral Pain:   Notes oral pain and has a hard time eating, wondering if she can have something for this. States that this started yesterday. Has not yet seen physician for this.     Today's Vitals: /74   Pulse 102   Wt 128 lb 11.2 oz (58.4 kg)   SpO2 95%   BMI 24.62 kg/m    ----------------      I spent 60 minutes with this patient today. All changes were made via collaborative practice agreement with Adrien Cardoza MD. A copy of the visit note was provided to the patient's provider(s).    A summary  of these recommendations was declined by the patient.    Malu Gil, PharmD, [facility]  Medication Therapy Management Pharmacist     Medication Therapy Recommendations  No medication therapy recommendations to display

## 2024-03-25 NOTE — Clinical Note
Dr. Cardoza, Counts include 234 beds at the [facility] - patient is having oral pain that she wanted to be seen for, I informed her to discuss with you at next visit.  - given her dizziness persists, not sure if we could consider holding the hydrochlorothiazide and maybe only starting a ACE/ARB in the future if needed, I also dont know her indication for beta blocker and would recommend stopping if no longer needed (but it looks like that last came from cardiology).  - I didn't change any diabetes meds because there is very little blood sugar data today.  - I think she should resume the fluoxetine or an alternative? Not sure what she has tried in the past though?  Let me know if you have questions Jaylon

## 2024-03-28 ENCOUNTER — TELEPHONE (OUTPATIENT)
Dept: FAMILY MEDICINE | Facility: CLINIC | Age: 54
End: 2024-03-28

## 2024-03-28 ENCOUNTER — OFFICE VISIT (OUTPATIENT)
Dept: ALLERGY | Facility: CLINIC | Age: 54
End: 2024-03-28
Payer: COMMERCIAL

## 2024-03-28 ENCOUNTER — OFFICE VISIT (OUTPATIENT)
Dept: FAMILY MEDICINE | Facility: CLINIC | Age: 54
End: 2024-03-28
Payer: COMMERCIAL

## 2024-03-28 VITALS
WEIGHT: 132.5 LBS | HEART RATE: 84 BPM | HEIGHT: 60 IN | RESPIRATION RATE: 18 BRPM | OXYGEN SATURATION: 97 % | DIASTOLIC BLOOD PRESSURE: 74 MMHG | SYSTOLIC BLOOD PRESSURE: 117 MMHG | BODY MASS INDEX: 26.01 KG/M2 | TEMPERATURE: 98.2 F

## 2024-03-28 VITALS — OXYGEN SATURATION: 97 % | WEIGHT: 131.4 LBS | BODY MASS INDEX: 25.13 KG/M2 | HEART RATE: 94 BPM

## 2024-03-28 DIAGNOSIS — I10 ESSENTIAL HYPERTENSION: ICD-10-CM

## 2024-03-28 DIAGNOSIS — R05.9 COUGH, UNSPECIFIED TYPE: Primary | ICD-10-CM

## 2024-03-28 DIAGNOSIS — J44.9 CHRONIC OBSTRUCTIVE PULMONARY DISEASE, UNSPECIFIED COPD TYPE (H): ICD-10-CM

## 2024-03-28 DIAGNOSIS — Z79.4 TYPE 2 DIABETES MELLITUS TREATED WITH INSULIN (H): ICD-10-CM

## 2024-03-28 DIAGNOSIS — E11.9 TYPE 2 DIABETES MELLITUS TREATED WITH INSULIN (H): ICD-10-CM

## 2024-03-28 DIAGNOSIS — J45.50 SEVERE PERSISTENT ASTHMA WITHOUT COMPLICATION (H): Primary | ICD-10-CM

## 2024-03-28 DIAGNOSIS — J45.51 SEVERE PERSISTENT ASTHMA WITH ACUTE EXACERBATION (H): ICD-10-CM

## 2024-03-28 DIAGNOSIS — K13.79 MOUTH PAIN: ICD-10-CM

## 2024-03-28 PROCEDURE — 99213 OFFICE O/P EST LOW 20 MIN: CPT | Performed by: FAMILY MEDICINE

## 2024-03-28 PROCEDURE — 99213 OFFICE O/P EST LOW 20 MIN: CPT | Mod: 25 | Performed by: ALLERGY & IMMUNOLOGY

## 2024-03-28 PROCEDURE — 96372 THER/PROPH/DIAG INJ SC/IM: CPT | Performed by: ALLERGY & IMMUNOLOGY

## 2024-03-28 RX ORDER — ASPIRIN 81 MG/1
81 TABLET ORAL DAILY
Qty: 90 TABLET | Refills: 3 | Status: CANCELLED | OUTPATIENT
Start: 2024-03-28

## 2024-03-28 RX ORDER — METOPROLOL TARTRATE 25 MG/1
TABLET, FILM COATED ORAL
Qty: 180 TABLET | Refills: 3 | Status: CANCELLED | OUTPATIENT
Start: 2024-03-28

## 2024-03-28 RX ORDER — PREDNISONE 20 MG/1
20 TABLET ORAL 3 TIMES DAILY
Qty: 15 TABLET | Refills: 0 | Status: SHIPPED | OUTPATIENT
Start: 2024-03-28 | End: 2024-09-05

## 2024-03-28 ASSESSMENT — ASTHMA QUESTIONNAIRES
QUESTION_1 LAST FOUR WEEKS HOW MUCH OF THE TIME DID YOUR ASTHMA KEEP YOU FROM GETTING AS MUCH DONE AT WORK, SCHOOL OR AT HOME: ALL OF THE TIME
ACT_TOTALSCORE: 6
QUESTION_4 LAST FOUR WEEKS HOW OFTEN HAVE YOU USED YOUR RESCUE INHALER OR NEBULIZER MEDICATION (SUCH AS ALBUTEROL): THREE OR MORE TIMES PER DAY
ACT_TOTALSCORE: 6
HOSPITALIZATION_OVERNIGHT_LAST_YEAR_TOTAL: ONE
QUESTION_3 LAST FOUR WEEKS HOW OFTEN DID YOUR ASTHMA SYMPTOMS (WHEEZING, COUGHING, SHORTNESS OF BREATH, CHEST TIGHTNESS OR PAIN) WAKE YOU UP AT NIGHT OR EARLIER THAN USUAL IN THE MORNING: FOUR OR MORE NIGHTS A WEEK
QUESTION_5 LAST FOUR WEEKS HOW WOULD YOU RATE YOUR ASTHMA CONTROL: POORLY CONTROLLED
QUESTION_2 LAST FOUR WEEKS HOW OFTEN HAVE YOU HAD SHORTNESS OF BREATH: MORE THAN ONCE A DAY
EMERGENCY_ROOM_LAST_YEAR_TOTAL: ONE

## 2024-03-28 ASSESSMENT — PATIENT HEALTH QUESTIONNAIRE - PHQ9
SUM OF ALL RESPONSES TO PHQ QUESTIONS 1-9: 9
SUM OF ALL RESPONSES TO PHQ QUESTIONS 1-9: 9
10. IF YOU CHECKED OFF ANY PROBLEMS, HOW DIFFICULT HAVE THESE PROBLEMS MADE IT FOR YOU TO DO YOUR WORK, TAKE CARE OF THINGS AT HOME, OR GET ALONG WITH OTHER PEOPLE: SOMEWHAT DIFFICULT

## 2024-03-28 NOTE — PROGRESS NOTES
"  Cough, unspecified type  Ongoing cough.  Oral steroid prescribed by allergy this morning.  Instructed to keep appointment with pulmonology.    Mouth pain  - magic mouthwash (ENTER INGREDIENTS IN COMMENTS) suspension; Take 5 mLs by mouth every 6 hours as needed    Essential hypertension  Discussed alternative for HCTZ per recommendation from our [facility] pharmacist.  Patient wishes to continue with current medications for now.  Will discuss again.    Type 2 diabetes mellitus treated with insulin (H)  A1c at next visit.  No med changes today.    Chronic obstructive pulmonary disease, unspecified COPD type (H)  Follow-up with pulmonology as scheduled.    Doug Blum is a 54 year old with multiple medical conditions seeing multiple specialists here to follow-up on cough.  I last saw her 2 weeks ago, she was given Z-Isaiah and prednisone taper dose.  She also completed Augmentin prior to last visit 2 weeks ago, prescribed by pulmonology.  Patient states she is not sure if the cough is improving but denied fever or chills.  Denied chest pain since last visit.  She saw allergy this morning and was given another round of oral steroid per daughter-in-law.    She is complaining of lumps on her tongue on and off and complaining of dry tongue.  She is requesting \" liquid pain medication\" for her mouth.        3/28/2024     1:30 PM   Additional Questions   Roomed by TONY COTA   Accompanied by DAUGHTER IN LAW         Review of Systems  CONSTITUTIONAL: NEGATIVE for fever, chills, change in weight  CV: NEGATIVE for chest pain/chest pressure today      Objective    /74   Pulse 84   Temp 98.2  F (36.8  C) (Oral)   Resp 18   Ht 1.524 m (5')   Wt 60.1 kg (132 lb 8 oz)   SpO2 97%   BMI 25.88 kg/m    Body mass index is 25.88 kg/m .    Physical Exam   GENERAL: alert and no distress  NECK: Supple  RESP: Coarse breath sounds with bilateral wheezing.  CV: regular rates and rhythm and no murmur, click or rub  PSYCH: mentation " appears normal    This transcription uses voice recognition software, which may contain typographical errors.    Signed Electronically by: Adrien Cardoza MD    Answers submitted by the patient for this visit:  Patient Health Questionnaire (Submitted on 3/28/2024)  If you checked off any problems, how difficult have these problems made it for you to do your work, take care of things at home, or get along with other people?: Somewhat difficult  PHQ9 TOTAL SCORE: 9

## 2024-03-28 NOTE — LETTER
3/28/2024         RE: Kulwant Amin  [address] [address]        Dear Colleague,    Thank you for referring your patient, Kulwant Amin, to the [facility]. Please see a copy of my visit note below.    Clinic Administered Medication Documentation      Injectable Medication Documentation    Is there an active order (written within the past 365 days, with administrations remaining, not ) in the chart? Yes.     Patient was given  Tezspire . Prior to medication administration, verified patient's identity using patient s name and date of birth. Please see MAR and medication order for additional information. Patient instructed to report any adverse reaction to staff immediately.    Vial/Syringe: Syringe  Was this medication supplied by the patient? No  Is this a medication the patient will need to receive again? Yes. Verified that the patient has refills remaining in their prescription.           Subjective  Kulwant is a 54 year old, presenting for the following health issues:  Allergy Injection and Asthma Recheck    HPI     Chief complaint: Asthma follow-up    History of present illness: This is a pleasant 54-year-old woman with a history of eosinophilic asthma here today for follow-up visit.  She has had very difficult to control asthma with frequent wheezing and shortness of breath as well as cough.  She was on Fasenra which helps with her asthma but she states that it caused increased itching.  Dupixent was not effective and now she is been on Tezspire, which has not been effective either.  Her daughter states that she continues on Spiriva and she has been compliant with this using it 2 puffs daily.  She is also on Breo using 1 puff daily.  Despite this she has used frequent nebulizer treatments.  She was in the emergency room in early March and was seen by Dr. Duong and placed on Augmentin for an infection.  She does not think these current injections are  improving symptoms.  She states prednisone does help.  She does not have a current prednisone at home.  We have talked previously about her seeking higher level care possibly at [facility] but she states her daughter states that she is unable to drive her there.  She states she does not want to see the [facility].          Objective   Pulse 94   Wt 59.6 kg (131 lb 6.4 oz)   SpO2 97%   BMI 25.13 kg/m    Body mass index is 25.13 kg/m .  Physical Exam     Gen: Pleasant female not in acute distress  HEENT: Eyes no erythema of the bulbar or palpebral conjunctiva, no edema. Nose: No congestion, mucosa normal. Mouth: Throat clear, no lip or tongue edema.   Respiratory: Expiratory wheezing throughout the posterior lung field  Skin: No rashes or lesions      Impression report and plan:  1.  Severe persistent asthma with acute exacerbation      I did give her some prednisone given her exam today.  She understands systemic risk of diabetes heart disease osteoporosis and cataracts with recurrent prednisone but prednisone seems to be the only medication that helps.  She will watch her blood sugars on this medication,  I have no other options with Biolog[employer] and I am not sure there is any other workup I can provide.  I will contact her pulmonologist regarding next steps.  They would like to hold on the Tezspire for now as they are not sure if it is improving symptoms.  However, after month if her symptoms worsen, restart.  Continue to follow with pulmonary for management.  Signed Electronically by: Elizabeth REYNOSO MD        Again, thank you for allowing me to participate in the care of your patient.        Sincerely,        Elizabeth REYNOSO MD

## 2024-03-28 NOTE — PATIENT INSTRUCTIONS
Prednisone 20 mg three times daily for 5 days    See how you do after 1 month    If symptoms worsen, notify and restart Tezspire

## 2024-03-28 NOTE — Clinical Note
I saw Kulwant today.  Still no improvement.  I am not sure I have any other biologic options for her, and I think she has reached the ceiling of what I can provide.  I offered to [facility] for [facility], but her daughter declined.  I gave her some prednisone as her wheezing was terrible again today, but I am not sure what else to do.  Elizabeth

## 2024-03-28 NOTE — PROGRESS NOTES
Subjective   Kulwant is a 54 year old, presenting for the following health issues:  Allergy Injection and Asthma Recheck    HPI     Chief complaint: Asthma follow-up    History of present illness: This is a pleasant 54-year-old woman with a history of eosinophilic asthma here today for follow-up visit.  She has had very difficult to control asthma with frequent wheezing and shortness of breath as well as cough.  She was on Fasenra which helps with her asthma but she states that it caused increased itching.  Dupixent was not effective and now she is been on Tezspire, which has not been effective either.  Her daughter states that she continues on Spiriva and she has been compliant with this using it 2 puffs daily.  She is also on Breo using 1 puff daily.  Despite this she has used frequent nebulizer treatments.  She was in the emergency room in early March and was seen by Dr. Duong and placed on Augmentin for an infection.  She does not think these current injections are improving symptoms.  She states prednisone does help.  She does not have a current prednisone at home.  We have talked previously about her seeking higher level care possibly at [facility] but she states her daughter states that she is unable to drive her there.  She states she does not want to see the [facility].          Objective    Pulse 94   Wt 59.6 kg (131 lb 6.4 oz)   SpO2 97%   BMI 25.13 kg/m    Body mass index is 25.13 kg/m .  Physical Exam     Gen: Pleasant female not in acute distress  HEENT: Eyes no erythema of the bulbar or palpebral conjunctiva, no edema. Nose: No congestion, mucosa normal. Mouth: Throat clear, no lip or tongue edema.   Respiratory: Expiratory wheezing throughout the posterior lung field  Skin: No rashes or lesions      Impression report and plan:  1.  Severe persistent asthma with acute exacerbation      I did give her some prednisone given her exam today.  She understands systemic risk of diabetes  heart disease osteoporosis and cataracts with recurrent prednisone but prednisone seems to be the only medication that helps.  She will watch her blood sugars on this medication,  I have no other options with Biolog[facility] and I am not sure there is any other workup I can provide.  I will contact her pulmonologist regarding next steps.  They would like to hold on the Tezspire for now as they are not sure if it is improving symptoms.  However, after month if her symptoms worsen, restart.  Continue to follow with pulmonary for management.  Signed Electronically by: Elizabeth REYNOSO MD

## 2024-03-28 NOTE — TELEPHONE ENCOUNTER
Medication Question or Refill        What medication are you calling about (include dose and sig)?: Magic Mouth Wash. Typical ingredients:  (benadry, maalox and or lidocane) are needed.  Please clarify and send in a new script.    Preferred Pharmacy:   [facility] - [place], [place] - [address]  [address]  [address]  [place] [address]  Phone: [phone] Fax: [phone]      Controlled Substance Agreement on file:   CSA -- Patient Level:    CSA: None found at the patient level.       Who prescribed the medication?: Dr. Cardoza       Thank you    Eric

## 2024-04-04 DIAGNOSIS — Z76.0 ENCOUNTER FOR MEDICATION REFILL: ICD-10-CM

## 2024-04-04 RX ORDER — ASPIRIN 81 MG/1
TABLET, COATED ORAL
Qty: 90 TABLET | Refills: 1 | Status: SHIPPED | OUTPATIENT
Start: 2024-04-04 | End: 2024-07-25

## 2024-04-05 ENCOUNTER — THERAPY VISIT (OUTPATIENT)
Dept: PHYSICAL THERAPY | Facility: REHABILITATION | Age: 54
End: 2024-04-05
Payer: COMMERCIAL

## 2024-04-05 DIAGNOSIS — M54.42 CHRONIC BILATERAL LOW BACK PAIN WITH BILATERAL SCIATICA: Primary | ICD-10-CM

## 2024-04-05 DIAGNOSIS — G89.29 CHRONIC BILATERAL LOW BACK PAIN WITH BILATERAL SCIATICA: Primary | ICD-10-CM

## 2024-04-05 DIAGNOSIS — M54.41 CHRONIC BILATERAL LOW BACK PAIN WITH BILATERAL SCIATICA: Primary | ICD-10-CM

## 2024-04-05 PROCEDURE — 97530 THERAPEUTIC ACTIVITIES: CPT | Mod: GP | Performed by: PHYSICAL THERAPIST

## 2024-04-05 PROCEDURE — 97110 THERAPEUTIC EXERCISES: CPT | Mod: GP | Performed by: PHYSICAL THERAPIST

## 2024-04-10 DIAGNOSIS — J44.9 CHRONIC OBSTRUCTIVE PULMONARY DISEASE, UNSPECIFIED COPD TYPE (H): ICD-10-CM

## 2024-04-11 RX ORDER — TIOTROPIUM BROMIDE INHALATION SPRAY 3.12 UG/1
2 SPRAY, METERED RESPIRATORY (INHALATION) DAILY
Qty: 4 G | Refills: 3 | Status: SHIPPED | OUTPATIENT
Start: 2024-04-11 | End: 2024-08-15

## 2024-04-16 ENCOUNTER — TELEPHONE (OUTPATIENT)
Dept: PULMONOLOGY | Facility: CLINIC | Age: 54
End: 2024-04-16
Payer: COMMERCIAL

## 2024-04-16 DIAGNOSIS — J45.51 SEVERE PERSISTENT ASTHMA WITH EXACERBATION (H): Primary | ICD-10-CM

## 2024-04-16 RX ORDER — PREDNISONE 10 MG/1
TABLET ORAL
Qty: 30 TABLET | Refills: 0 | Status: SHIPPED | OUTPATIENT
Start: 2024-04-16 | End: 2024-09-05

## 2024-04-16 NOTE — TELEPHONE ENCOUNTER
[facility] Call Center    Phone Message    May a detailed message be left on voicemail: yes     Reason for Call: please call Kulwant's dtr she is needing to speak with the nurse as Kulwant is having some issues with coughing and wheezing thank you    Action Taken: Pulm    Travel Screening: Not Applicable

## 2024-04-16 NOTE — TELEPHONE ENCOUNTER
Spoke with Kulwant's daughter, Billy.  She has had increased cough and wheezing for past 3-4 days.  States that she did well after medications that dr. Duong had her take early March and was feeling well until past few days.     Will prescribe same medications for her to start today:  prednisone taper and Augmentin bid x 5 days

## 2024-04-18 DIAGNOSIS — K59.00 CONSTIPATION, UNSPECIFIED CONSTIPATION TYPE: Primary | ICD-10-CM

## 2024-04-18 RX ORDER — POLYETHYLENE GLYCOL 3350 17 G/17G
1 POWDER, FOR SOLUTION ORAL DAILY
Qty: 238 G | Refills: 1 | Status: SHIPPED | OUTPATIENT
Start: 2024-04-18

## 2024-04-25 ENCOUNTER — OFFICE VISIT (OUTPATIENT)
Dept: PULMONOLOGY | Facility: CLINIC | Age: 54
End: 2024-04-25
Attending: INTERNAL MEDICINE
Payer: COMMERCIAL

## 2024-04-25 VITALS
OXYGEN SATURATION: 98 % | WEIGHT: 132.2 LBS | SYSTOLIC BLOOD PRESSURE: 118 MMHG | BODY MASS INDEX: 25.82 KG/M2 | DIASTOLIC BLOOD PRESSURE: 79 MMHG | HEART RATE: 81 BPM

## 2024-04-25 DIAGNOSIS — J45.51 SEVERE PERSISTENT ASTHMA WITH EXACERBATION (H): ICD-10-CM

## 2024-04-25 DIAGNOSIS — J82.83 EOSINOPHILIC ASTHMA: Primary | ICD-10-CM

## 2024-04-25 PROCEDURE — 99214 OFFICE O/P EST MOD 30 MIN: CPT | Performed by: INTERNAL MEDICINE

## 2024-04-25 RX ORDER — GUAIFENESIN/DEXTROMETHORPHAN 100-10MG/5
10 SYRUP ORAL EVERY 4 HOURS PRN
Qty: 240 ML | Refills: 3 | Status: SHIPPED | OUTPATIENT
Start: 2024-04-25 | End: 2024-06-10

## 2024-04-25 RX ORDER — PREDNISONE 20 MG/1
40 TABLET ORAL DAILY
Qty: 10 TABLET | Refills: 0 | Status: SHIPPED | OUTPATIENT
Start: 2024-04-25 | End: 2024-04-30

## 2024-04-25 ASSESSMENT — ASTHMA QUESTIONNAIRES
QUESTION_1 LAST FOUR WEEKS HOW MUCH OF THE TIME DID YOUR ASTHMA KEEP YOU FROM GETTING AS MUCH DONE AT WORK, SCHOOL OR AT HOME: ALL OF THE TIME
HOSPITALIZATION_OVERNIGHT_LAST_YEAR_TOTAL: ONE
QUESTION_3 LAST FOUR WEEKS HOW OFTEN DID YOUR ASTHMA SYMPTOMS (WHEEZING, COUGHING, SHORTNESS OF BREATH, CHEST TIGHTNESS OR PAIN) WAKE YOU UP AT NIGHT OR EARLIER THAN USUAL IN THE MORNING: FOUR OR MORE NIGHTS A WEEK
ACT_TOTALSCORE: 6
ACT_TOTALSCORE: 6
QUESTION_5 LAST FOUR WEEKS HOW WOULD YOU RATE YOUR ASTHMA CONTROL: POORLY CONTROLLED
EMERGENCY_ROOM_LAST_YEAR_TOTAL: ONE
QUESTION_4 LAST FOUR WEEKS HOW OFTEN HAVE YOU USED YOUR RESCUE INHALER OR NEBULIZER MEDICATION (SUCH AS ALBUTEROL): THREE OR MORE TIMES PER DAY
QUESTION_2 LAST FOUR WEEKS HOW OFTEN HAVE YOU HAD SHORTNESS OF BREATH: MORE THAN ONCE A DAY

## 2024-04-25 NOTE — PROGRESS NOTES
Pulmonary Outpatient Clinic Follow Up      Assessment/Plan:    54 year old woman with history of organic fuel exposure in the home was previously had nondiagnostic PFTs.  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    Severe persistent asthma with exacerbation: Continues to have severe symptoms and has had repeated cardiac workup for the symptoms as well.  She responds intermittently to steroids but continues to require short acting bronchodilators 3-4 times a day and has not responded well to monoclonal antibodies.  I discussed with her daughter-in-law that we do not have to do any more therapies to offer and it may be beneficial for her to be seen by an asthma specialist.  Placed a consult for Dr. Elsie Francois at [facility] for second opinion.  Continue Breo Ellipta 1 puff daily.  She knows to gargle after using this.   Continue Spiriva.  Continue to use 3 times daily albuterol nebs.  Continue Singulair daily.  As needed albuterol rescue.  Has a 9 years old hospital bed that is now broken and requires a new 1.  I have placed an order for DME for this as she requires the head of her bed elevated to help with her asthma.  Questionable eosinophilic esophagitis:   Follows with GI for this.  Omeprazole 40 mg a day.  As needed Tums.  Follow-up: 6 weeks with me.    Tamra Duong MD  Pulmonary and Critical Care  (p) [phone]      Subjective:   Patient ID: Ms. Amin is a 54 year old female with a past medical history significant for anxiety, arthritis, questionable asthma versus COPD, diabetes, hypertension and a prior history of SVT presents with her daughter in law for a follow-up visit.  Since her last clinic visit with me she was seen by Dr. Marie and it is noted that she has previously been on Fasenra which alleviated her symptoms of asthma but caused her to  have significant itching.  She was trialed on Dupixent with minimal efficacy and has not been in test by which has not been effective either.  She continues to use Breo and Spiriva and uses her DuoNeb nebulizers 3 times a day.  She continues to be audibly wheezy, has dyspnea on exertion and shortness of breath.  She endorses chest tightness which is worsened with minimal activity and she also endorses significant pains throughout her body particularly her thighs which prevent her from being very active.          3/8/2024     9:10 AM 3/28/2024    10:35 AM 4/25/2024    11:00 AM   ACT Total Scores   ACT TOTAL SCORE (Goal Greater than or Equal to 20) 8 6 6   In the past 12 months, how many times did you visit the emergency room for your asthma without being admitted to the hospital? 0 1 1   In the past 12 months, how many times were you hospitalized overnight because of your asthma? 0 1 1       Current Outpatient Medications   Medication Sig Dispense Refill    acetaminophen (TYLENOL) 500 MG tablet TAKE 1 PILL BY MOUTH EVERY 6 HOURS AS NEEDED FOR PAIN 100 tablet 10    albuterol (PROAIR HFA/PROVENTIL HFA/VENTOLIN HFA) 108 (90 Base) MCG/ACT inhaler Inhale 2 puffs into the lungs every 4 hours as needed for shortness of breath 18 g 11    albuterol (PROVENTIL) (2.5 MG/3ML) 0.083% neb solution Take 1 vial (2.5 mg) by nebulization every 6 hours as needed for shortness of breath, wheezing or cough 360 mL 11    amitriptyline (ELAVIL) 50 MG tablet Take 1 tablet (50 mg) by mouth at bedtime 90 tablet 1    aspirin (ASPIRIN LOW DOSE) 81 MG EC tablet TAKE 1 TABLET (81 MG TOTAL) BY MOUTH DAILY. 90 tablet 1    atorvastatin (LIPITOR) 80 MG tablet Take 1 tablet (80 mg) by mouth daily 90 tablet 3    colchicine (COLCRYS) 0.6 MG tablet Take 1 tablet (0.6 mg) by mouth daily 90 tablet 3    Continuous Blood Gluc Sensor (FREESTYLE MONICA 2 SENSOR) [facility] 1 EACH EVERY 14 DAYS USE 1 SENSOR EVERY 14 DAYS. USE TO READ BLOOD SUGARS PER 'S  INSTRUCTIONS. 2 each 11    diclofenac (VOLTAREN) 1 % topical gel Apply 4 g topically 4 times daily 350 g 3    docusate sodium (COLACE) 100 MG capsule Take 2 capsules (200 mg) by mouth 2 times daily as needed for constipation 90 capsule 3    ferrous sulfate (FEROSUL) 325 (65 Fe) MG tablet Take 1 tablet (325 mg) by mouth daily (with breakfast) 90 tablet 1    fluticasone-vilanterol (BREO ELLIPTA) 200-25 MCG/ACT inhaler Inhale 1 puff into the lungs daily 60 each 11    gabapentin (NEURONTIN) 600 MG tablet TAKE 1 PILL (600 MG) BY MOUTH 3 TIMES DAILY 90 tablet 3    galcanezumab-gnlm (EMGALITY) 120 MG/ML injection Inject 1 mL (120 mg) Subcutaneous every 28 days 1 mL 3    hydrochlorothiazide (MICROZIDE) 12.5 MG capsule Take 1 capsule (12.5 mg) by mouth every morning 90 capsule 3    insulin aspart (NOVOLOG PEN) 100 UNIT/ML pen Inject 20 Units Subcutaneous 2 times daily (before meals) 45 mL 3    insulin glargine (LANTUS PEN) 100 UNIT/ML pen Inject 44 Units Subcutaneous every morning 45 mL 3    insulin pen needle (32G X 4 MM) 32G X 4 MM miscellaneous Use 3 pen needles daily or as directed. 300 each 4    Lidocaine (LIDOCARE) 4 % Patch Place 1 patch onto the skin every 24 hours To prevent lidocaine toxicity, patient should be patch free for 12 hrs daily. 30 patch 1    loratadine (CLARITIN) 10 MG tablet Take 10 mg by mouth daily      magic mouthwash (ENTER INGREDIENTS IN COMMENTS) suspension Take 5 mLs by mouth every 4 hours as needed 200 mL 0    magic mouthwash (ENTER INGREDIENTS IN COMMENTS) suspension Take 5 mLs by mouth every 6 hours as needed 200 mL 0    meclizine (ANTIVERT) 12.5 MG tablet Take 12.5 mg by mouth 3 times daily as needed for dizziness      metFORMIN (GLUCOPHAGE XR) 500 MG 24 hr tablet Take 2 tablets (1,000 mg) by mouth 2 times daily (with meals) 360 tablet 3    metoprolol tartrate (LOPRESSOR) 25 MG tablet TAKE 1 TABLET (25 MG TOTAL) BY MOUTH 2 (TWO) TIMES A DAY FOR BLOOD PRESSURE 180 tablet 3    montelukast  (SINGULAIR) 10 MG tablet Take 1 tablet (10 mg) by mouth at bedtime 30 tablet 11    omeprazole (PRILOSEC) 40 MG DR capsule Take 1 capsule (40 mg) by mouth daily 90 capsule 3    polyethylene glycol (MIRALAX) 17 GM/Dose powder Take 17 g (1 Capful) by mouth daily 238 g 1    predniSONE (DELTASONE) 10 MG tablet Take 4 tabs daily x 3 days, THEN 3 tabs daily x 3 days, THEN 2 tabs daily x 3 days, THEN 1 tab daily x 3 days 30 tablet 0    predniSONE (DELTASONE) 20 MG tablet Take 1 tablet (20 mg) by mouth 3 times daily 15 tablet 0    sucralfate (CARAFATE) 1 GM/10ML suspension Take 10 mLs (1 g) by mouth 4 times daily 3600 mL 1    tiotropium (SPIRIVA RESPIMAT) 2.5 MCG/ACT inhaler INHALE 2 PUFFS INTO THE LUNGS DAILY 4 g 3    tiZANidine (ZANAFLEX) 2 MG tablet Take 1 tablet (2 mg) by mouth 3 times daily as needed for muscle spasms 270 tablet 0     Social history:  Lives in a house built in 1963; no concern for mold in the house.  7 People live in the house including 2 children.  There are no pets in the house.  She is a never smoker and there is no second hand exposure to smoke.  She does not drink alcoholic beverages and denies indulging in recreational drugs.    Physical Exam   /79 (BP Location: Right arm, Patient Position: Sitting, Cuff Size: Adult Regular)   Pulse 81   Wt 60 kg (132 lb 3.2 oz)   SpO2 98%   BMI 25.82 kg/m      Physical Exam  Constitutional:       General: She is not in acute distress.     Appearance: She is not ill-appearing or diaphoretic.   Cardiovascular:      Rate and Rhythm: Normal rate and regular rhythm.      Pulses: Normal pulses.      Heart sounds: Normal heart sounds.   Pulmonary:      Effort: Pulmonary effort is normal. No respiratory distress.      Breath sounds: Wheezing present. No rhonchi.   Musculoskeletal:      Right lower leg: No edema.      Left lower leg: No edema.   Skin:     General: Skin is warm and dry.      Findings: No rash.   Neurological:      Mental Status: She is alert.    Psychiatric:         Behavior: Behavior normal.            Latest Reference Range & Units 02/07/22 15:47   Neutrophil Cytoplasmic Antibody <1:10  <1:10   Neutrophil Cytoplasmic Antibody Pattern  The ANCA IFA is <1:10.  No further testing will be performed.      Latest Reference Range & Units 02/07/22 15:47   Schistosoma Ab IgG Negative  Negative [1]   Strongyloides Bere IgG <=0.9 IV 0.4 [2]      Latest Reference Range & Units 02/07/22 15:47   Immunoglobulin E <=214 kU/L 74 [1]   Allergen Fungimold A Fumigatus IgE <=0.34 kU/L <0.10 [2]   Aspergillus Fumagatis 1 Antibody None Detected  None Detected [3]   Aspergillus Fumagatis 6 Antibody None Detected  None Detected [4]   Allergen, Interp, Immunocap Score IgE  See Note [5]     XR CHEST 2 VIEWS, DATE/TIME: 3/27/2023:  INDICATION: chest pain  COMPARISON: 02/05/2023                                                              IMPRESSION: No pneumothorax or pleural effusion. No focal consolidation. Cardiac silhouette within normal limits. Mildly atherosclerotic aorta.    CT CHEST W/O CONTRAST, DATE/TIME: 7/11/2022   1.  No acute abnormality. No cause of chest pain is evident.  2.  A few small lung nodules without significant change.  Recommendations for one or multiple incidental lung nodules < 6mm :   Low risk patients: No routine follow-up.   High risk patients: Optional follow-up CT at 12 months; if unchanged, no further follow-up.    ECHO 5/4/2022  Interpretation Summary  Left ventricular size, wall motion and function are normal. The ejection  fraction is 55-60%.  There is borderline anterior, septal, and apical wall hypokinesis.  Normal right ventricle size and systolic function.  No hemodynamically significant valvular abnormalities on 2D or color flow imaging.

## 2024-05-05 DIAGNOSIS — G89.29 CHRONIC BILATERAL LOW BACK PAIN WITH BILATERAL SCIATICA: ICD-10-CM

## 2024-05-05 DIAGNOSIS — M54.42 CHRONIC BILATERAL LOW BACK PAIN WITH BILATERAL SCIATICA: ICD-10-CM

## 2024-05-05 DIAGNOSIS — M54.41 CHRONIC BILATERAL LOW BACK PAIN WITH BILATERAL SCIATICA: ICD-10-CM

## 2024-05-05 DIAGNOSIS — Z76.0 ENCOUNTER FOR MEDICATION REFILL: ICD-10-CM

## 2024-05-07 RX ORDER — GABAPENTIN 600 MG/1
TABLET ORAL
Qty: 90 TABLET | Refills: 3 | Status: SHIPPED | OUTPATIENT
Start: 2024-05-07 | End: 2024-08-27

## 2024-05-07 NOTE — TELEPHONE ENCOUNTER
Last appointment: 03/04/2024  Next appointment: None    Notes/Comments:      Rx request(s) has been reviewed.

## 2024-05-08 ENCOUNTER — TELEPHONE (OUTPATIENT)
Dept: PULMONOLOGY | Facility: CLINIC | Age: 54
End: 2024-05-08
Payer: COMMERCIAL

## 2024-05-08 NOTE — TELEPHONE ENCOUNTER
DATE:  5/8/24  DME PROVIDER:  [facility]   SUPPLY ORDERED:  HOSPITAL BED  PROVIDER:  SHANELLE BERNAL MD    COMMENT:  5:01 PM- Faxed hospital bed orders, face to face office notes and face sheet to [facility] at [phone].    Kirk GILES CMA, M [facility]

## 2024-05-11 DIAGNOSIS — K21.9 GASTROESOPHAGEAL REFLUX DISEASE WITHOUT ESOPHAGITIS: Chronic | ICD-10-CM

## 2024-05-13 RX ORDER — OMEPRAZOLE 40 MG/1
40 CAPSULE, DELAYED RELEASE ORAL DAILY
Qty: 90 CAPSULE | Refills: 2 | Status: SHIPPED | OUTPATIENT
Start: 2024-05-13 | End: 2024-09-05

## 2024-05-14 ENCOUNTER — APPOINTMENT (OUTPATIENT)
Dept: RADIOLOGY | Facility: HOSPITAL | Age: 54
End: 2024-05-14
Attending: EMERGENCY MEDICINE
Payer: COMMERCIAL

## 2024-05-14 ENCOUNTER — HOSPITAL ENCOUNTER (EMERGENCY)
Facility: HOSPITAL | Age: 54
Discharge: HOME | End: 2024-05-14
Attending: EMERGENCY MEDICINE | Admitting: EMERGENCY MEDICINE
Payer: COMMERCIAL

## 2024-05-14 VITALS
OXYGEN SATURATION: 98 % | TEMPERATURE: 97.9 F | WEIGHT: 126 LBS | RESPIRATION RATE: 23 BRPM | SYSTOLIC BLOOD PRESSURE: 113 MMHG | DIASTOLIC BLOOD PRESSURE: 67 MMHG | BODY MASS INDEX: 24.61 KG/M2 | HEART RATE: 93 BPM

## 2024-05-14 DIAGNOSIS — J45.21 MILD INTERMITTENT ASTHMA WITH EXACERBATION: ICD-10-CM

## 2024-05-14 DIAGNOSIS — R07.89 ATYPICAL CHEST PAIN: ICD-10-CM

## 2024-05-14 LAB
ALBUMIN SERPL BCG-MCNC: 3.8 G/DL (ref 3.5–5.2)
ALP SERPL-CCNC: 110 U/L (ref 40–150)
ALT SERPL W P-5'-P-CCNC: 41 U/L (ref 0–50)
ANION GAP SERPL CALCULATED.3IONS-SCNC: 11 MMOL/L (ref 7–15)
AST SERPL W P-5'-P-CCNC: 46 U/L (ref 0–45)
BASOPHILS # BLD AUTO: 0 10E3/UL (ref 0–0.2)
BASOPHILS NFR BLD AUTO: 1 %
BILIRUB DIRECT SERPL-MCNC: <0.2 MG/DL (ref 0–0.3)
BILIRUB SERPL-MCNC: 0.4 MG/DL
BUN SERPL-MCNC: 10.1 MG/DL (ref 6–20)
CALCIUM SERPL-MCNC: 9.4 MG/DL (ref 8.6–10)
CHLORIDE SERPL-SCNC: 101 MMOL/L (ref 98–107)
CREAT SERPL-MCNC: 0.55 MG/DL (ref 0.51–0.95)
DEPRECATED HCO3 PLAS-SCNC: 24 MMOL/L (ref 22–29)
EGFRCR SERPLBLD CKD-EPI 2021: >90 ML/MIN/1.73M2
EOSINOPHIL # BLD AUTO: 0.3 10E3/UL (ref 0–0.7)
EOSINOPHIL NFR BLD AUTO: 4 %
ERYTHROCYTE [DISTWIDTH] IN BLOOD BY AUTOMATED COUNT: 15.6 % (ref 10–15)
FLUAV RNA SPEC QL NAA+PROBE: NEGATIVE
FLUBV RNA RESP QL NAA+PROBE: NEGATIVE
GLUCOSE SERPL-MCNC: 151 MG/DL (ref 70–99)
HCT VFR BLD AUTO: 41.1 % (ref 35–47)
HGB BLD-MCNC: 12.9 G/DL (ref 11.7–15.7)
IMM GRANULOCYTES # BLD: 0 10E3/UL
IMM GRANULOCYTES NFR BLD: 0 %
LYMPHOCYTES # BLD AUTO: 2.5 10E3/UL (ref 0.8–5.3)
LYMPHOCYTES NFR BLD AUTO: 32 %
MAGNESIUM SERPL-MCNC: 1.7 MG/DL (ref 1.7–2.3)
MCH RBC QN AUTO: 25.6 PG (ref 26.5–33)
MCHC RBC AUTO-ENTMCNC: 31.4 G/DL (ref 31.5–36.5)
MCV RBC AUTO: 82 FL (ref 78–100)
MONOCYTES # BLD AUTO: 0.5 10E3/UL (ref 0–1.3)
MONOCYTES NFR BLD AUTO: 7 %
NEUTROPHILS # BLD AUTO: 4.5 10E3/UL (ref 1.6–8.3)
NEUTROPHILS NFR BLD AUTO: 56 %
NRBC # BLD AUTO: 0 10E3/UL
NRBC BLD AUTO-RTO: 0 /100
PLATELET # BLD AUTO: 231 10E3/UL (ref 150–450)
POTASSIUM SERPL-SCNC: 4.1 MMOL/L (ref 3.4–5.3)
PROT SERPL-MCNC: 6.8 G/DL (ref 6.4–8.3)
RBC # BLD AUTO: 5.03 10E6/UL (ref 3.8–5.2)
RSV RNA SPEC NAA+PROBE: NEGATIVE
SARS-COV-2 RNA RESP QL NAA+PROBE: NEGATIVE
SODIUM SERPL-SCNC: 136 MMOL/L (ref 135–145)
TROPONIN T SERPL HS-MCNC: <6 NG/L
WBC # BLD AUTO: 8 10E3/UL (ref 4–11)

## 2024-05-14 PROCEDURE — 71045 X-RAY EXAM CHEST 1 VIEW: CPT

## 2024-05-14 PROCEDURE — 96374 THER/PROPH/DIAG INJ IV PUSH: CPT

## 2024-05-14 PROCEDURE — 99285 EMERGENCY DEPT VISIT HI MDM: CPT | Mod: 25

## 2024-05-14 PROCEDURE — 85041 AUTOMATED RBC COUNT: CPT | Performed by: EMERGENCY MEDICINE

## 2024-05-14 PROCEDURE — 80053 COMPREHEN METABOLIC PANEL: CPT | Performed by: EMERGENCY MEDICINE

## 2024-05-14 PROCEDURE — 82248 BILIRUBIN DIRECT: CPT | Performed by: EMERGENCY MEDICINE

## 2024-05-14 PROCEDURE — 250N000011 HC RX IP 250 OP 636: Performed by: EMERGENCY MEDICINE

## 2024-05-14 PROCEDURE — 36415 COLL VENOUS BLD VENIPUNCTURE: CPT | Performed by: EMERGENCY MEDICINE

## 2024-05-14 PROCEDURE — 83735 ASSAY OF MAGNESIUM: CPT | Performed by: EMERGENCY MEDICINE

## 2024-05-14 PROCEDURE — 84484 ASSAY OF TROPONIN QUANT: CPT | Performed by: EMERGENCY MEDICINE

## 2024-05-14 PROCEDURE — 250N000009 HC RX 250: Performed by: EMERGENCY MEDICINE

## 2024-05-14 PROCEDURE — 87637 SARSCOV2&INF A&B&RSV AMP PRB: CPT | Performed by: EMERGENCY MEDICINE

## 2024-05-14 PROCEDURE — 93005 ELECTROCARDIOGRAM TRACING: CPT | Performed by: EMERGENCY MEDICINE

## 2024-05-14 RX ORDER — METHYLPREDNISOLONE SODIUM SUCCINATE 125 MG/2ML
125 INJECTION, POWDER, LYOPHILIZED, FOR SOLUTION INTRAMUSCULAR; INTRAVENOUS ONCE
Status: COMPLETED | OUTPATIENT
Start: 2024-05-14 | End: 2024-05-14

## 2024-05-14 RX ORDER — PREDNISONE 20 MG/1
60 TABLET ORAL DAILY
Qty: 12 TABLET | Refills: 0 | Status: SHIPPED | OUTPATIENT
Start: 2024-05-14 | End: 2024-05-18

## 2024-05-14 RX ORDER — IPRATROPIUM BROMIDE AND ALBUTEROL SULFATE 2.5; .5 MG/3ML; MG/3ML
3 SOLUTION RESPIRATORY (INHALATION) ONCE
Status: COMPLETED | OUTPATIENT
Start: 2024-05-14 | End: 2024-05-14

## 2024-05-14 RX ADMIN — METHYLPREDNISOLONE SODIUM SUCCINATE 125 MG: 125 INJECTION, POWDER, FOR SOLUTION INTRAMUSCULAR; INTRAVENOUS at 09:27

## 2024-05-14 RX ADMIN — IPRATROPIUM BROMIDE AND ALBUTEROL SULFATE 3 ML: .5; 3 SOLUTION RESPIRATORY (INHALATION) at 09:27

## 2024-05-14 ASSESSMENT — COLUMBIA-SUICIDE SEVERITY RATING SCALE - C-SSRS
6. HAVE YOU EVER DONE ANYTHING, STARTED TO DO ANYTHING, OR PREPARED TO DO ANYTHING TO END YOUR LIFE?: NO
2. HAVE YOU ACTUALLY HAD ANY THOUGHTS OF KILLING YOURSELF IN THE PAST MONTH?: NO
1. IN THE PAST MONTH, HAVE YOU WISHED YOU WERE DEAD OR WISHED YOU COULD GO TO SLEEP AND NOT WAKE UP?: NO

## 2024-05-14 ASSESSMENT — ACTIVITIES OF DAILY LIVING (ADL)
ADLS_ACUITY_SCORE: 38
ADLS_ACUITY_SCORE: 38

## 2024-05-14 NOTE — DISCHARGE INSTRUCTIONS
Continue nebs at home regularly. Take prednisone as directed until gone with food. Return for new/worsening concerns.

## 2024-05-14 NOTE — ED TRIAGE NOTES
Developed mid sternal chest pain and sob  last night, endorses knee pain. Hx chf.     Triage Assessment (Adult)       Row Name 05/14/24 0902          Triage Assessment    Airway WDL WDL        Respiratory WDL    Respiratory WDL WDL        Cardiac WDL    Cardiac WDL X;chest pain

## 2024-05-14 NOTE — ED NOTES
Patient reports improved breathing and decreased chest tightness, now 6/10 from 10/10. She states she has spoken to the doctor and is ready to go home.

## 2024-05-14 NOTE — ED PROVIDER NOTES
Emergency Department Encounter     Evaluation Date & Time:   No admission date for patient encounter.    CHIEF COMPLAINT:  Chest Pain      Triage Note:       ED COURSE & MEDICAL DECISION MAKING:     Pt here with family for evaluation of dyspnea and chest pain since last night.  Pt reports pain worse with movement.  Pt has a chronic cough per family, no reported fevers. Hx of asthma and used nebs today. She has some faint wheezes b/l, no true history of CHF with last echo on 7/24/23 showing normal EF and diastolic function.  Pt without hypoxia or distress here. She's also having b/l knee pain for the past week or so.  Exam unremarkable with no effusion, no calf tenderness/swelling.  No trauma.  No indication for xrays of knees or US of legs. Do not suspect PE based on history/exam.      ED Course as of 05/14/24 1239   Tue May 14, 2024   0908 I met the patient and performed my initial interview and exam. The patient's family member was translating and providing history.   1021 hsTrop < 6, ACS ruled out.  Rest of labs unremarkable, Swabs all negative.   1021 CXR (independent interpretation): no acute cardiopulmonary process    1022 Rechecked and updated patient and her family on labs and imaging.  Oxygen remains 100%.  Discussed results, treatment with nebs at home, short prednisone burst, outpatient follow up. Pt and family agreeable.           Medical Decision Making    History:  Supplemental history from: Family Member/Significant Other  External Record(s) reviewed: Documented in chart    Work Up:  Chart documentation includes differential considered and any EKGs or imaging independently interpreted by provider, where specified.  In additional to work up documented, I considered the following work up: Documented in chart, if applicable.    External consultation:  Discussion of management with another provider: Documented in chart, if applicable    Complicating factors:  Care impacted by chronic illness: Chronic  Lung Disease, Diabetes, Heart Disease, Hyperlipidemia, and Hypertension  Care affected by social determinants of health: N/A    Disposition considerations: Discharge. I prescribed additional prescription strength medication(s) as charted. I considered admission, but ultimately discharged patient after reassuring workup, improvement, outpatient follow up and treatment plan.  .      At the conclusion of the encounter I discussed the results of all the tests and the disposition. The questions were answered. The patient or family acknowledged understanding and was agreeable with the care plan.      MEDICATIONS GIVEN IN THE EMERGENCY DEPARTMENT:  Medications   ipratropium - albuterol 0.5 mg/2.5 mg/3 mL (DUONEB) neb solution 3 mL (3 mLs Nebulization $Given 5/14/24 0927)   methylPREDNISolone sodium succinate (solu-MEDROL) injection 125 mg (125 mg Intravenous $Given 5/14/24 0927)       NEW PRESCRIPTIONS STARTED AT TODAY'S ED VISIT:  Discharge Medication List as of 5/14/2024 10:38 AM        START taking these medications    Details   !! predniSONE (DELTASONE) 20 MG tablet Take 3 tablets (60 mg) by mouth daily for 4 days, Disp-12 tablet, R-0, E-Prescribe       !! - Potential duplicate medications found. Please discuss with provider.          HPI   HPI     Kulwant Amin is a 54 year old female with a pertinent history of latent TB, COPD, hypertension, hyperlipidemia, CAD, coronary stent placement, who presents to this ED via wheelchair for evaluation of chest pain.    The patient has some intermittent substernal chest pain and a chronic cough, but yesterday evening the patient had an increase in chest pain and tightness with wheezing. The pain increases with movement and breathing, and she notes a bit more shortness of breath than normal. She has asthma and COPD, and use of a nebulizer today helped her symptoms a bit, but she is unsure if these symptoms feel like a COPD exacerbation.     The patient also reports bilateral knee  pain for 1 week, with no trauma or injury surrounding onset. The pain sometimes radiates to her back, and she used gabapentin and Tylenol for pain.    The patient had no sick contacts, no clot kx, and no recent travel. She denies fever. No other complaints noted at this time.    REVIEW OF SYSTEMS:  See HPI      Medical History     Past Medical History:   Diagnosis Date    Acute asthma exacerbation 01/06/2020    Anxiety     Arthritis     Asthma exacerbation 11/19/2015    Chronic obstructive pulmonary disease, unspecified COPD type (H)     COPD (chronic obstructive pulmonary disease) (H)     Coronary artery disease due to lipid rich plaque     Depression     Epigastric pain 12/15/2021    Essential hypertension     GERD (gastroesophageal reflux disease)     Infection due to 2019 novel coronavirus 01/03/2022    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    Microcytic anemia 11/17/2019    S/P coronary artery stent placement 02/02/2018    TB lung, latent        Past Surgical History:   Procedure Laterality Date    CORONARY STENT PLACEMENT  2018    CV CORONARY ANGIOGRAM N/A 1/25/2018    Procedure: Coronary Angiogram;  Surgeon: Angelo Serrano MD;  Location: [facility] Cath Lab;  Service:     CV CORONARY ANGIOGRAM N/A 5/5/2022    Procedure: Coronary Angiogram;  Surgeon: Irwin Cano MD;  Location: [facility] CATH LAB CV    CV CORONARY ANGIOGRAM  5/5/2022    Procedure: ;  Surgeon: Irwin Cano MD;  Location: [facility] CV    VA ESOPHAGOGASTRODUODENOSCOPY TRANSORAL DIAGNOSTIC N/A 12/10/2018    Procedure: ESOPHAGOGASTRODUODENOSCOPY (EGD);  Surgeon: Eddie Renteria MD;  Location: [facility];  Service: Gastroenterology    VA ESOPHAGOGASTRODUODENOSCOPY TRANSORAL DIAGNOSTIC N/A 12/3/2020    Procedure: ESOPHAGOGASTRODUODENOSCOPY (EGD) with biospies ;  Surgeon: Avi Crow MD;  Location: [facility];  Service: Gastroenterology    [facility] COLONOSCOPY W/WO BRUSH/WASH N/A 12/10/2018    Procedure: COLONOSCOPY with  polypectomy using biopsy forceps;  Surgeon: Eddie Renteria MD;  Location: [facility];  Service: Gastroenterology       Family History   Problem Relation Age of Onset    Other - See Comments Mother          of an intestinal problem    Ulcers Father          of gastritis    Breast Cancer No family hx of     Ovarian Cancer No family hx of     Colon Cancer No family hx of        Social History     Tobacco Use    Smoking status: Former     Current packs/day: 0.00     Average packs/day: 1 pack/day for 30.0 years (30.0 ttl pk-yrs)     Types: Cigarettes     Start date: 1973     Quit date: 2003     Years since quittin.3     Passive exposure: Never    Smokeless tobacco: Never    Tobacco comments:     No passive exposure   Vaping Use    Vaping status: Never Used   Substance Use Topics    Alcohol use: No    Drug use: No       predniSONE (DELTASONE) 20 MG tablet  acetaminophen (TYLENOL) 500 MG tablet  albuterol (PROAIR HFA/PROVENTIL HFA/VENTOLIN HFA) 108 (90 Base) MCG/ACT inhaler  albuterol (PROVENTIL) (2.5 MG/3ML) 0.083% neb solution  amitriptyline (ELAVIL) 50 MG tablet  aspirin (ASPIRIN LOW DOSE) 81 MG EC tablet  atorvastatin (LIPITOR) 80 MG tablet  colchicine (COLCRYS) 0.6 MG tablet  Continuous Blood Gluc Sensor (FREESTYLE MONICA 2 SENSOR) MISC  diclofenac (VOLTAREN) 1 % topical gel  docusate sodium (COLACE) 100 MG capsule  ferrous sulfate (FEROSUL) 325 (65 Fe) MG tablet  fluticasone-vilanterol (BREO ELLIPTA) 200-25 MCG/ACT inhaler  gabapentin (NEURONTIN) 600 MG tablet  galcanezumab-gnlm (EMGALITY) 120 MG/ML injection  guaiFENesin-dextromethorphan (ROBITUSSIN DM) 100-10 MG/5ML syrup  hydrochlorothiazide (MICROZIDE) 12.5 MG capsule  insulin aspart (NOVOLOG PEN) 100 UNIT/ML pen  insulin glargine (LANTUS PEN) 100 UNIT/ML pen  insulin pen needle (32G X 4 MM) 32G X 4 MM miscellaneous  Lidocaine (LIDOCARE) 4 % Patch  loratadine (CLARITIN) 10 MG tablet  magic mouthwash (ENTER INGREDIENTS IN COMMENTS)  suspension  magic mouthwash (ENTER INGREDIENTS IN COMMENTS) suspension  meclizine (ANTIVERT) 12.5 MG tablet  metFORMIN (GLUCOPHAGE XR) 500 MG 24 hr tablet  metoprolol tartrate (LOPRESSOR) 25 MG tablet  montelukast (SINGULAIR) 10 MG tablet  omeprazole (PRILOSEC) 40 MG DR capsule  polyethylene glycol (MIRALAX) 17 GM/Dose powder  predniSONE (DELTASONE) 10 MG tablet  predniSONE (DELTASONE) 20 MG tablet  sucralfate (CARAFATE) 1 GM/10ML suspension  tiotropium (SPIRIVA RESPIMAT) 2.5 MCG/ACT inhaler  tiZANidine (ZANAFLEX) 2 MG tablet        Physical Exam     Vitals:  /67   Pulse 93   Temp 97.9  F (36.6  C) (Temporal)   Resp 23   Wt 57.2 kg (126 lb)   SpO2 98%   BMI 24.61 kg/m      PHYSICAL EXAM:   Physical Exam  Vitals and nursing note reviewed.   Constitutional:       General: She is not in acute distress.     Appearance: Normal appearance.   HENT:      Head: Normocephalic and atraumatic.      Nose: Nose normal.      Mouth/Throat:      Mouth: Mucous membranes are moist.   Eyes:      Pupils: Pupils are equal, round, and reactive to light.   Neck:      Vascular: No JVD.   Cardiovascular:      Rate and Rhythm: Normal rate and regular rhythm.      Pulses: Normal pulses.           Radial pulses are 2+ on the right side and 2+ on the left side.        Dorsalis pedis pulses are 2+ on the right side and 2+ on the left side.   Pulmonary:      Effort: Pulmonary effort is normal. No respiratory distress.      Comments: Faint expiratory wheezes, left greater than right.  Abdominal:      Palpations: Abdomen is soft.      Tenderness: There is no abdominal tenderness.   Musculoskeletal:      Cervical back: Full passive range of motion without pain and neck supple.      Comments: No calf tenderness or swelling b/l  Non-tender knees bilaterally with full range of motion, and no edema, effusion, erythema, or warmth.   Skin:     General: Skin is warm.      Findings: No rash.   Neurological:      General: No focal deficit present.       Mental Status: She is alert. Mental status is at baseline.      Comments: Fluent speech, no acute lateralizing deficits   Psychiatric:         Mood and Affect: Mood normal.         Behavior: Behavior normal.           Results     LAB:  All pertinent labs reviewed and interpreted  Labs Ordered and Resulted from Time of ED Arrival to Time of ED Departure   BASIC METABOLIC PANEL - Abnormal       Result Value    Sodium 136      Potassium 4.1      Chloride 101      Carbon Dioxide (CO2) 24      Anion Gap 11      Urea Nitrogen 10.1      Creatinine 0.55      GFR Estimate >90      Calcium 9.4      Glucose 151 (*)    HEPATIC FUNCTION PANEL - Abnormal    Protein Total 6.8      Albumin 3.8      Bilirubin Total 0.4      Alkaline Phosphatase 110      AST 46 (*)     ALT 41      Bilirubin Direct <0.20     CBC WITH PLATELETS AND DIFFERENTIAL - Abnormal    WBC Count 8.0      RBC Count 5.03      Hemoglobin 12.9      Hematocrit 41.1      MCV 82      MCH 25.6 (*)     MCHC 31.4 (*)     RDW 15.6 (*)     Platelet Count 231      % Neutrophils 56      % Lymphocytes 32      % Monocytes 7      % Eosinophils 4      % Basophils 1      % Immature Granulocytes 0      NRBCs per 100 WBC 0      Absolute Neutrophils 4.5      Absolute Lymphocytes 2.5      Absolute Monocytes 0.5      Absolute Eosinophils 0.3      Absolute Basophils 0.0      Absolute Immature Granulocytes 0.0      Absolute NRBCs 0.0     TROPONIN T, HIGH SENSITIVITY - Normal    Troponin T, High Sensitivity <6     MAGNESIUM - Normal    Magnesium 1.7     INFLUENZA A/B, RSV, & SARS-COV2 PCR - Normal    Influenza A PCR Negative      Influenza B PCR Negative      RSV PCR Negative      SARS CoV2 PCR Negative         RADIOLOGY:  XR Chest Port 1 View   Final Result   IMPRESSION: Clear lungs. Stable enlarged cardiac silhouette. Normal pulmonary vascularity.                   ECG:  NSR, rate 83, normal intervals, no acute ischemia    I have independently reviewed and interpreted the EKG(s)  documented above     PROCEDURES:  Procedures:  none      FINAL IMPRESSION:    ICD-10-CM    1. Mild intermittent asthma with exacerbation  J45.21       2. Atypical chest pain  R07.89           0 minutes of critical care time      I, Bonilla Dupont, am serving as a scribe to document services personally performed by Dr. Sunny Freeman, based on my observations and the provider's statements to me. I, Sunny Freeman, DO attest that Bonilla Dupont is acting in a scribe capacity, has observed my performance of the services and has documented them in accordance with my direction.      Sunny Freeman DO  Emergency Medicine  [facility] EMERGENCY DEPARTMENT  5/14/2024  9:04 AM          Sunny Freeman MD  05/14/24 4827

## 2024-05-15 ENCOUNTER — PATIENT OUTREACH (OUTPATIENT)
Dept: CARE COORDINATION | Facility: CLINIC | Age: 54
End: 2024-05-15
Payer: COMMERCIAL

## 2024-05-15 NOTE — LETTER
?????? ???    ? [facility] ?? ??????? ?????? ?????? ???? ? ???????? ??????? ??????? ?????? ? ??????? ????????????????? ????????? ???? ?????? ?????? ???????? ???? ?????? ???? ?????? ????? ?????? ??????? ????? ?? ???? ?????? ?? ?    ?????? ??????? ??????? ?????? ?????? ????????? ????, ???? ????? ? ????????? ????????? ????? ????????? ? ??????? ??????? ???????? ?????? ????? ?????????? ??????? ??????? ??????????? ?????????? ???? ? ????????? ???? ????????????? ??????? ??????? ????? ???????? ???? ???????? ????? ????? ????? ? ? ?????? ?????? ????????????????? ??????? ?????? ?????, ????????? ?????? ????, ???????????? ????? ????????? ????? ? ????????????????? ???? ??? ????????????? ???????? ?????? ?????? ???? ??????? ????????? ?????? ????????? ???? ?????? ???????? ????? ???? ???? ?    ????? ???? ??? ?????? ?? ???????? ??????? ???? [phone] ??? ?? ??????? ???? ???????????????? ? ???? ???????? ????? ???????? ????-????????? ????????? ???? ????? ?????? ??????? ????????? ????? ??? ? ???? ???????? ??????? ????????????????? ????????? ???? ???? ? ?????????? ????????? ????? ???? ???????? ?    ?????,   [facility] ?? ??????? ?????? ????            Xochitl Blum,    I am a clinic care coordinator who works at [facility]. I'm writing to tell you about clinic care coordination, and how we may be able to support you in achieving your health-related goals.    Our clinic care coordination team consists of a registered nurse, , and community health worker. The goal of clinic care coordination is to help you manage your health and to improve your access to the health care system in an efficient way. Our team can help you meet your health care goals by providing you with health information, coordinating services, strengthening the communication among your providers, and supporting you with any resource needs.    Please feel free to contact [phone] with any questions or concerns. We are focused on providing  you with the highest-quality health care experience possible. We want to help you achieve and maintain your health goals.    Sincerely,     Your Care Team at [facility]

## 2024-05-15 NOTE — PROGRESS NOTES
[facility]  Community Health Worker Initial Outreach    CHW spoke with pt's daughter in law, Billy. Consent to communicate on file.     CHW Initial Information Gathering:  Referral Source: ED Follow-Up  CHW Additional Questions  If ED/Hospital discharge, follow-up appointment scheduled as recommended?: Yes (Pt has appointment on 05/23/2024 with PCP which was scheduled prior to outreach)  [place] active?: Yes    Patient accepts CC: No, patient's daughter in law, Billy, declined at this time. Patient will be sent Care Coordination introduction letter in Portuguese and English for future reference.       Abida Llamas  Community Health Worker  Connected Care Resource [place], [facility]    *Connected Care Resource Team does NOT follow patient ongoing. Referrals are identified based on internal discharge reports and the outreach is to ensure patient has an understanding of their discharge instructions.

## 2024-05-17 LAB
ATRIAL RATE - MUSE: 83 BPM
DIASTOLIC BLOOD PRESSURE - MUSE: NORMAL MMHG
INTERPRETATION ECG - MUSE: NORMAL
P AXIS - MUSE: 52 DEGREES
PR INTERVAL - MUSE: 154 MS
QRS DURATION - MUSE: 92 MS
QT - MUSE: 394 MS
QTC - MUSE: 462 MS
R AXIS - MUSE: -64 DEGREES
SYSTOLIC BLOOD PRESSURE - MUSE: NORMAL MMHG
T AXIS - MUSE: 49 DEGREES
VENTRICULAR RATE- MUSE: 83 BPM

## 2024-05-21 ENCOUNTER — HOSPITAL ENCOUNTER (EMERGENCY)
Facility: HOSPITAL | Age: 54
Discharge: HOME | End: 2024-05-21
Admitting: EMERGENCY MEDICINE
Payer: COMMERCIAL

## 2024-05-21 ENCOUNTER — THERAPY VISIT (OUTPATIENT)
Dept: PHYSICAL THERAPY | Facility: REHABILITATION | Age: 54
End: 2024-05-21
Payer: COMMERCIAL

## 2024-05-21 VITALS
SYSTOLIC BLOOD PRESSURE: 130 MMHG | TEMPERATURE: 97.8 F | DIASTOLIC BLOOD PRESSURE: 73 MMHG | RESPIRATION RATE: 16 BRPM | OXYGEN SATURATION: 98 % | HEART RATE: 88 BPM

## 2024-05-21 DIAGNOSIS — G89.29 CHRONIC BILATERAL LOW BACK PAIN WITH BILATERAL SCIATICA: Primary | ICD-10-CM

## 2024-05-21 DIAGNOSIS — M54.41 CHRONIC BILATERAL LOW BACK PAIN WITH BILATERAL SCIATICA: Primary | ICD-10-CM

## 2024-05-21 DIAGNOSIS — M54.42 CHRONIC BILATERAL LOW BACK PAIN WITH BILATERAL SCIATICA: Primary | ICD-10-CM

## 2024-05-21 DIAGNOSIS — R51.9 HEADACHE: ICD-10-CM

## 2024-05-21 PROCEDURE — 250N000013 HC RX MED GY IP 250 OP 250 PS 637: Performed by: EMERGENCY MEDICINE

## 2024-05-21 PROCEDURE — 97535 SELF CARE MNGMENT TRAINING: CPT | Mod: GP | Performed by: PHYSICAL THERAPIST

## 2024-05-21 PROCEDURE — 99283 EMERGENCY DEPT VISIT LOW MDM: CPT

## 2024-05-21 RX ORDER — ACETAMINOPHEN 325 MG/1
975 TABLET ORAL ONCE
Status: COMPLETED | OUTPATIENT
Start: 2024-05-21 | End: 2024-05-21

## 2024-05-21 RX ADMIN — ACETAMINOPHEN 975 MG: 325 TABLET ORAL at 10:08

## 2024-05-21 ASSESSMENT — ACTIVITIES OF DAILY LIVING (ADL): ADLS_ACUITY_SCORE: 38

## 2024-05-21 NOTE — ED PROVIDER NOTES
EMERGENCY DEPARTMENT ENCOUNTER      NAME: Kulwant Amin  AGE: 54 year old female  YOB: 1970  MRN: [id number]  EVALUATION DATE & TIME: 5/21/2024  9:40 AM    PCP: Adrien Cardoza    ED PROVIDER: Manasa Anderson PA-C      Chief Complaint   Patient presents with    Tachycardia         FINAL IMPRESSION:  1. Headache          MEDICAL DECISION MAKING:    Pertinent Labs & Imaging studies reviewed. (See chart for details)  Kulwant Amin is a 54 year old female with a pertinent history of COPD, chronic headache, CAD, GERD, hypertension, hyperlipidemia, type II diabetes who presents to this ED via private vehicle for evaluation of tachycardia at PT appointment.  The patient was at physical therapy for back pain when she was on the tricycle and had elevation of her blood pressure and pulse.  She had a mild headache today which is chronic for her however, physical therapy was concerned and sent her to the emergency department.  On my evaluation, patient was slightly hypertensive at 142/77 but otherwise vitally stable.  Examination with heart and regular rate and rhythm and lungs are auscultation bilaterally.  Pupils equal round and reactive to light and jugular movements intact.    It is unclear the exact reason they were sent here however, I daughter-in-law notes that while she was doing PT her blood pressure and heart rate were elevated and that made the physical therapist concerned which prompted their presentation.  Patient has had a slight headache but has chronic headaches and has not taken her Tylenol which she normally takes.  Additionally, patient has not eaten or drink anything today which could be contributing to her symptoms.  I did give her some Tylenol here as well as something to eat and drink and she is feeling well somewhat improved however, vitals have been normal here.  Blood pressure normalized to 129/75 with and patient and family requesting be discharged home.  I do feel that this is reasonable as  patient has no other symptoms other than her chronic headache and vitals here are normal.  I do not feel workup such as CBC, BMP or imaging is indicated at this time.  We discussed reasons to return as well as close follow-up as scheduled with PT and primary care and they were in agreement understanding with the plan of care.  All questions were answered to the best my ability and she was discharged home in stable condition.    Medical Decision Making  Obtained supplemental history:Supplemental history obtained?: Family Member/Significant Other  Reviewed external records: External records reviewed?: No  Care impacted by chronic illness:Chronic Pain, Diabetes, Heart Disease, Hyperlipidemia, Hypertension, and Mental Health  Care significantly affected by social determinants of health:Access to Medical Care  Did you consider but not order tests?: Work up considered but not performed and documented in chart, if applicable  Did you interpret images independently?: Independent interpretation of ECG and images noted in documentation, when applicable.  Consultation discussion with other provider:Did you involve another provider (consultant, MH, pharmacy, etc.)?: No  Discharge. No recommendations on prescription strength medication(s). See documentation for any additional details.     ED COURSE:  9:54 AM I met with the patient, obtained history, performed an initial exam, and discussed options and plan for diagnostics and treatment here in the ED.    10:37 AM Patient discharged after being provided with extensive anticipatory guidance and given return precautions, importance of PCP follow-up emphasized.    At the conclusion of the encounter I discussed the results of all of the tests and the disposition. The questions were answered. The patient or family acknowledged understanding and was agreeable with the care plan.     MEDICATIONS GIVEN IN THE EMERGENCY:  Medications   acetaminophen (TYLENOL) tablet 975 mg (975 mg Oral  $Given 5/21/24 1008)       NEW PRESCRIPTIONS STARTED AT TODAY'S ER VISIT  New Prescriptions    No medications on file            =================================================================    HPI:    Patient information was obtained from: The patient and daughter-in-law    Use of Interpretor: N/A         Kulwant Amin is a 54 year old female with a pertinent history of COPD, chronic headache, CAD, GERD, hypertension, hyperlipidemia, type II diabetes who presents to this ED via private vehicle for evaluation of tachycardia at PT appointment.  The patient was at physical therapy for back pain when she was on the tricycle and had elevation of her blood pressure and pulse.  She had a mild headache today which is chronic for her however, physical therapy was concerned and sent her to the emergency department.  On my evaluation, patient reports a mild headache without any fevers, chills, focal weakness, vision changes, nausea, vomiting.  She has not eat or drink anything today.  No chest pain, cough, shortness of breath.  Patient is otherwise at baseline health.  No other concerns voiced at this time.      REVIEW OF SYSTEMS:  Negative unless otherwise stated in the above HPI.       PAST MEDICAL HISTORY:  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    Microcytic anemia 11/17/2019    S/P coronary artery stent placement 02/02/2018    TB lung, latent     9 mos INH       PAST SURGICAL HISTORY:  Past Surgical  History:   Procedure Laterality Date    CORONARY STENT PLACEMENT  2018    CV CORONARY ANGIOGRAM N/A 1/25/2018    Procedure: Coronary Angiogram;  Surgeon: Angelo Serrano MD;  Location: [facility] Cath Lab;  Service:     CV CORONARY ANGIOGRAM N/A 5/5/2022    Procedure: Coronary Angiogram;  Surgeon: Irwin Cano MD;  Location: [facility] CATH LAB CV    CV CORONARY ANGIOGRAM  5/5/2022    Procedure: ;  Surgeon: Irwin Cano MD;  Location: [facility] LAB CV    AL ESOPHAGOGASTRODUODENOSCOPY TRANSORAL DIAGNOSTIC N/A 12/10/2018    Procedure: ESOPHAGOGASTRODUODENOSCOPY (EGD);  Surgeon: Eddie Renteria MD;  Location: [facility];  Service: Gastroenterology    AL ESOPHAGOGASTRODUODENOSCOPY TRANSORAL DIAGNOSTIC N/A 12/3/2020    Procedure: ESOPHAGOGASTRODUODENOSCOPY (EGD) with biospies ;  Surgeon: Avi Crow MD;  Location: [facility];  Service: Gastroenterology    [facility] COLONOSCOPY W/WO BRUSH/WASH N/A 12/10/2018    Procedure: COLONOSCOPY with polypectomy using biopsy forceps;  Surgeon: Eddie Renteria MD;  Location: [facility];  Service: Gastroenterology           CURRENT MEDICATIONS:      Current Facility-Administered Medications:     tezepelumab-ekko (TEZSPIRE) injection 210 mg, 210 mg, Subcutaneous, q28 days, Elizabeth Marie MD, 210 mg at 03/28/24 1039    Current Outpatient Medications:     acetaminophen (TYLENOL) 500 MG tablet, TAKE 1 PILL BY MOUTH EVERY 6 HOURS AS NEEDED FOR PAIN, Disp: 100 tablet, Rfl: 10    albuterol (PROAIR HFA/PROVENTIL HFA/VENTOLIN HFA) 108 (90 Base) MCG/ACT inhaler, Inhale 2 puffs into the lungs every 4 hours as needed for shortness of breath, Disp: 18 g, Rfl: 11    albuterol (PROVENTIL) (2.5 MG/3ML) 0.083% neb solution, Take 1 vial (2.5 mg) by nebulization every 6 hours as needed for shortness of breath, wheezing or cough, Disp: 360 mL, Rfl: 11    amitriptyline (ELAVIL) 50 MG tablet, Take 1 tablet (50 mg) by mouth at bedtime, Disp: 90 tablet, Rfl: 1    aspirin  (ASPIRIN LOW DOSE) 81 MG EC tablet, TAKE 1 TABLET (81 MG TOTAL) BY MOUTH DAILY., Disp: 90 tablet, Rfl: 1    atorvastatin (LIPITOR) 80 MG tablet, Take 1 tablet (80 mg) by mouth daily, Disp: 90 tablet, Rfl: 3    colchicine (COLCRYS) 0.6 MG tablet, Take 1 tablet (0.6 mg) by mouth daily, Disp: 90 tablet, Rfl: 3    Continuous Blood Gluc Sensor (FREESTYLE MONICA 2 SENSOR) MISC, 1 EACH EVERY 14 DAYS USE 1 SENSOR EVERY 14 DAYS. USE TO READ BLOOD SUGARS PER 'S INSTRUCTIONS., Disp: 2 each, Rfl: 11    diclofenac (VOLTAREN) 1 % topical gel, Apply 4 g topically 4 times daily, Disp: 350 g, Rfl: 3    docusate sodium (COLACE) 100 MG capsule, Take 2 capsules (200 mg) by mouth 2 times daily as needed for constipation, Disp: 90 capsule, Rfl: 3    ferrous sulfate (FEROSUL) 325 (65 Fe) MG tablet, Take 1 tablet (325 mg) by mouth daily (with breakfast), Disp: 90 tablet, Rfl: 1    fluticasone-vilanterol (BREO ELLIPTA) 200-25 MCG/ACT inhaler, Inhale 1 puff into the lungs daily, Disp: 60 each, Rfl: 11    gabapentin (NEURONTIN) 600 MG tablet, TAKE 1 PILL (600 MG) BY MOUTH 3 TIMES DAILY, Disp: 90 tablet, Rfl: 3    galcanezumab-gnlm (EMGALITY) 120 MG/ML injection, Inject 1 mL (120 mg) Subcutaneous every 28 days, Disp: 1 mL, Rfl: 3    guaiFENesin-dextromethorphan (ROBITUSSIN DM) 100-10 MG/5ML syrup, Take 10 mLs by mouth every 4 hours as needed for cough, Disp: 240 mL, Rfl: 3    hydrochlorothiazide (MICROZIDE) 12.5 MG capsule, Take 1 capsule (12.5 mg) by mouth every morning, Disp: 90 capsule, Rfl: 3    insulin aspart (NOVOLOG PEN) 100 UNIT/ML pen, Inject 20 Units Subcutaneous 2 times daily (before meals), Disp: 45 mL, Rfl: 3    insulin glargine (LANTUS PEN) 100 UNIT/ML pen, Inject 44 Units Subcutaneous every morning, Disp: 45 mL, Rfl: 3    insulin pen needle (32G X 4 MM) 32G X 4 MM miscellaneous, Use 3 pen needles daily or as directed., Disp: 300 each, Rfl: 4    Lidocaine (LIDOCARE) 4 % Patch, Place 1 patch onto the skin every 24 hours  To prevent lidocaine toxicity, patient should be patch free for 12 hrs daily., Disp: 30 patch, Rfl: 1    loratadine (CLARITIN) 10 MG tablet, Take 10 mg by mouth daily, Disp: , Rfl:     magic mouthwash (ENTER INGREDIENTS IN COMMENTS) suspension, Take 5 mLs by mouth every 4 hours as needed, Disp: 200 mL, Rfl: 0    magic mouthwash (ENTER INGREDIENTS IN COMMENTS) suspension, Take 5 mLs by mouth every 6 hours as needed, Disp: 200 mL, Rfl: 0    meclizine (ANTIVERT) 12.5 MG tablet, Take 12.5 mg by mouth 3 times daily as needed for dizziness, Disp: , Rfl:     metFORMIN (GLUCOPHAGE XR) 500 MG 24 hr tablet, Take 2 tablets (1,000 mg) by mouth 2 times daily (with meals), Disp: 360 tablet, Rfl: 3    metoprolol tartrate (LOPRESSOR) 25 MG tablet, TAKE 1 TABLET (25 MG TOTAL) BY MOUTH 2 (TWO) TIMES A DAY FOR BLOOD PRESSURE, Disp: 180 tablet, Rfl: 3    montelukast (SINGULAIR) 10 MG tablet, Take 1 tablet (10 mg) by mouth at bedtime, Disp: 30 tablet, Rfl: 11    omeprazole (PRILOSEC) 40 MG DR capsule, TAKE 1 CAPSULE (40 MG) BY MOUTH DAILY, Disp: 90 capsule, Rfl: 2    polyethylene glycol (MIRALAX) 17 GM/Dose powder, Take 17 g (1 Capful) by mouth daily, Disp: 238 g, Rfl: 1    predniSONE (DELTASONE) 10 MG tablet, Take 4 tabs daily x 3 days, THEN 3 tabs daily x 3 days, THEN 2 tabs daily x 3 days, THEN 1 tab daily x 3 days, Disp: 30 tablet, Rfl: 0    predniSONE (DELTASONE) 20 MG tablet, Take 1 tablet (20 mg) by mouth 3 times daily, Disp: 15 tablet, Rfl: 0    sucralfate (CARAFATE) 1 GM/10ML suspension, Take 10 mLs (1 g) by mouth 4 times daily, Disp: 3600 mL, Rfl: 1    tiotropium (SPIRIVA RESPIMAT) 2.5 MCG/ACT inhaler, INHALE 2 PUFFS INTO THE LUNGS DAILY, Disp: 4 g, Rfl: 3    tiZANidine (ZANAFLEX) 2 MG tablet, Take 1 tablet (2 mg) by mouth 3 times daily as needed for muscle spasms, Disp: 270 tablet, Rfl: 0      ALLERGIES:  No Known Allergies    FAMILY HISTORY:  Family History   Problem Relation Age of Onset    Other - See Comments Mother           of an intestinal problem    Ulcers Father          of gastritis    Breast Cancer No family hx of     Ovarian Cancer No family hx of     Colon Cancer No family hx of        SOCIAL HISTORY:   Social History     Socioeconomic History    Marital status:    Tobacco Use    Smoking status: Former     Current packs/day: 0.00     Average packs/day: 1 pack/day for 30.0 years (30.0 ttl pk-yrs)     Types: Cigarettes     Start date: 1973     Quit date: 2003     Years since quittin.4     Passive exposure: Never    Smokeless tobacco: Never    Tobacco comments:     No passive exposure   Vaping Use    Vaping status: Never Used   Substance and Sexual Activity    Alcohol use: No    Drug use: No    Sexual activity: Yes     Partners: Male   Social History Narrative    2017 The patient lives with her daughter-in-law (who is present), , son, and 2 grandchildren (total of 6 people). Imm[place].     Social Determinants of Health     Financial Resource Strain: Low Risk  (2/15/2024)    Financial Resource Strain     Within the past 12 months, have you or your family members you live with been unable to get utilities (heat, electricity) when it was really needed?: No   Food Insecurity: Low Risk  (2/15/2024)    Food Insecurity     Within the past 12 months, did you worry that your food would run out before you got money to buy more?: No     Within the past 12 months, did the food you bought just not last and you didn t have money to get more?: No   Transportation Needs: Low Risk  (2/15/2024)    Transportation Needs     Within the past 12 months, has lack of transportation kept you from medical appointments, getting your medicines, non-medical meetings or appointments, work, or from getting things that you need?: No   Housing Stability: Low Risk  (2/15/2024)    Housing Stability     Do you have housing? : Yes     Are you worried about losing your housing?: No       VITALS:  Patient Vitals for the past 24  hrs:   BP Temp Temp src Pulse Resp SpO2   05/21/24 0945 129/75 98.7  F (37.1  C) Oral 93 -- 98 %   05/21/24 0939 (!) 142/77 96.8  F (36  C) Temporal 99 16 99 %       PHYSICAL EXAM    Constitutional: Well developed, Well nourished, NAD  HENT: Normocephalic, Atraumatic, Bilateral external ears normal, Oropharynx normal, mucous membranes moist, Nose normal.   Neck: Normal range of motion, No tenderness, Supple, No stridor.  Eyes: PERRL, EOMI, Conjunctiva normal, No discharge.   Respiratory: Normal breath sounds, No respiratory distress, No wheezing, Speaks full sentences easily. No cough.  Cardiovascular: Normal heart rate, Regular rhythm, No murmurs, No rubs, No gallops. Chest wall nontender.  Musculoskeletal: Good range of motion in all major joints.  Integument: Warm, Dry, No erythema, No rash. No petechiae.  Neurologic: Alert & oriented x 3, Normal motor function, Normal sensory function, No focal deficits noted. Normal gait.  Psychiatric: Affect normal, Judgment normal, Mood normal. Cooperative.    LAB:  All pertinent labs reviewed and interpreted.  No results found for this or any previous visit (from the past 24 hour(s)).      RADIOLOGY:  Reviewed all pertinent imaging. Please see official radiology report.  No orders to display       PROCEDURES:   None.     Manasa Anderson PA-C  Emergency Medicine  [facility]  5/21/2024      Manasa Anderson PA-C  05/21/24 1039

## 2024-05-21 NOTE — ED TRIAGE NOTES
Patient was across the street attempting P.T. for Bilateral chronic leg pain, staff sent here for pulse over 100, patient has no symptoms other than her baseline leg pain.      Triage Assessment (Adult)       Row Name 05/21/24 0938          Triage Assessment    Airway WDL WDL        Respiratory WDL    Respiratory WDL WDL        Skin Circulation/Temperature WDL    Skin Circulation/Temperature WDL WDL        Cardiac WDL    Cardiac WDL WDL        Peripheral/Neurovascular WDL    Peripheral Neurovascular WDL WDL        Cognitive/Neuro/Behavioral WDL    Cognitive/Neuro/Behavioral WDL WDL

## 2024-05-21 NOTE — DISCHARGE INSTRUCTIONS
You were seen and evaluated here for elevated blood pressure and heart rate and physical therapy. Fortunately, vitals here are normal and after tylenol and something to eat and drink you are feeling better. I recommend continuing to use tylenol for your chronic headaches and all the medications given for your back pain. Please follow up with PT as scheduled.     If you develop severe sudden onset worsening headache, focal weakness, vision changes, vomiting or any other concerns please return to the ED.    Otherwise follow up with PT and primary care.

## 2024-05-23 ENCOUNTER — ANCILLARY PROCEDURE (OUTPATIENT)
Dept: GENERAL RADIOLOGY | Facility: CLINIC | Age: 54
End: 2024-05-23
Attending: FAMILY MEDICINE
Payer: COMMERCIAL

## 2024-05-23 ENCOUNTER — OFFICE VISIT (OUTPATIENT)
Dept: FAMILY MEDICINE | Facility: CLINIC | Age: 54
End: 2024-05-23
Payer: COMMERCIAL

## 2024-05-23 VITALS
OXYGEN SATURATION: 96 % | BODY MASS INDEX: 25.58 KG/M2 | WEIGHT: 130.31 LBS | SYSTOLIC BLOOD PRESSURE: 136 MMHG | HEIGHT: 60 IN | HEART RATE: 88 BPM | DIASTOLIC BLOOD PRESSURE: 74 MMHG | TEMPERATURE: 98.2 F | RESPIRATION RATE: 16 BRPM

## 2024-05-23 DIAGNOSIS — M25.562 CHRONIC PAIN OF BOTH KNEES: ICD-10-CM

## 2024-05-23 DIAGNOSIS — M25.561 CHRONIC PAIN OF BOTH KNEES: ICD-10-CM

## 2024-05-23 DIAGNOSIS — G47.33 OSA (OBSTRUCTIVE SLEEP APNEA): Chronic | ICD-10-CM

## 2024-05-23 DIAGNOSIS — G89.29 CHRONIC PAIN OF BOTH KNEES: ICD-10-CM

## 2024-05-23 DIAGNOSIS — E11.9 TYPE 2 DIABETES MELLITUS TREATED WITH INSULIN (H): ICD-10-CM

## 2024-05-23 DIAGNOSIS — M54.50 CHRONIC LOW BACK PAIN WITHOUT SCIATICA, UNSPECIFIED BACK PAIN LATERALITY: ICD-10-CM

## 2024-05-23 DIAGNOSIS — R51.9 NONINTRACTABLE HEADACHE, UNSPECIFIED CHRONICITY PATTERN, UNSPECIFIED HEADACHE TYPE: Primary | ICD-10-CM

## 2024-05-23 DIAGNOSIS — I25.83 CORONARY ARTERY DISEASE DUE TO LIPID RICH PLAQUE: ICD-10-CM

## 2024-05-23 DIAGNOSIS — G89.29 CHRONIC LOW BACK PAIN WITHOUT SCIATICA, UNSPECIFIED BACK PAIN LATERALITY: ICD-10-CM

## 2024-05-23 DIAGNOSIS — F33.9 RECURRENT MAJOR DEPRESSIVE DISORDER, REMISSION STATUS UNSPECIFIED (H): ICD-10-CM

## 2024-05-23 DIAGNOSIS — I25.10 CORONARY ARTERY DISEASE DUE TO LIPID RICH PLAQUE: ICD-10-CM

## 2024-05-23 DIAGNOSIS — Z79.4 TYPE 2 DIABETES MELLITUS TREATED WITH INSULIN (H): ICD-10-CM

## 2024-05-23 DIAGNOSIS — J44.9 CHRONIC OBSTRUCTIVE PULMONARY DISEASE, UNSPECIFIED COPD TYPE (H): ICD-10-CM

## 2024-05-23 PROCEDURE — 36415 COLL VENOUS BLD VENIPUNCTURE: CPT | Performed by: FAMILY MEDICINE

## 2024-05-23 PROCEDURE — 99214 OFFICE O/P EST MOD 30 MIN: CPT | Performed by: FAMILY MEDICINE

## 2024-05-23 PROCEDURE — 73562 X-RAY EXAM OF KNEE 3: CPT | Mod: TC | Performed by: RADIOLOGY

## 2024-05-23 PROCEDURE — G2211 COMPLEX E/M VISIT ADD ON: HCPCS | Performed by: FAMILY MEDICINE

## 2024-05-23 PROCEDURE — 80061 LIPID PANEL: CPT | Performed by: FAMILY MEDICINE

## 2024-05-23 ASSESSMENT — PATIENT HEALTH QUESTIONNAIRE - PHQ9
10. IF YOU CHECKED OFF ANY PROBLEMS, HOW DIFFICULT HAVE THESE PROBLEMS MADE IT FOR YOU TO DO YOUR WORK, TAKE CARE OF THINGS AT HOME, OR GET ALONG WITH OTHER PEOPLE: NOT DIFFICULT AT ALL
SUM OF ALL RESPONSES TO PHQ QUESTIONS 1-9: 12
SUM OF ALL RESPONSES TO PHQ QUESTIONS 1-9: 12

## 2024-05-23 NOTE — PROGRESS NOTES
Nonintractable headache, unspecified chronicity pattern, unspecified headache type  Acute on chronic.  Improving now.  Follow-up with neurology.    Type 2 diabetes mellitus treated with insulin (H)  No medication changes today.  - Lipid panel reflex to direct LDL Non-fasting    Recurrent major depressive disorder, remission status unspecified (H24)  Stable.    Chronic pain of both knees  - XR Knee Bilateral 3 Views; Future    Coronary artery disease due to lipid rich plaque  Follow-up with cardiology as scheduled.    Chronic obstructive pulmonary disease, unspecified COPD type (H)  Managed by pulmonology.    Chronic low back pain without sciatica, unspecified back pain laterality  Increase gabapentin to 600 mg 3 times a day, currently taking twice a day.  Daughter-in-law states she sees pain specialist, no recent note available to review.    Doug Blum is a 54 year old female with multiple chronic medical conditions and seeing multiple specialist here for ED follow-up.  She was seen in the ED 2 days ago due to headache, shortness of breath and tachycardia.  Chest x-ray was did not show any new findings.  Labs including troponin, BMP CBC influenza are all unremarkable.  Headache is improving but complaining of low back pain (chronic) she is taking gabapentin 600 mg twice a day and lidocaine patch.  States the patches not helping much but still using it.  No dizziness after taking gabapentin.  Blood sugar numbers are in the 140s to 150s in the recent days.  No low blood sugar with hypoglycemic symptoms reported.  Depression symptoms are stable.  She wants x-ray for her knees.  Complaining of bilateral knee pain on and off.  No acute injury to the knees.  No acute swelling or redness in the knees.        5/23/2024    10:24 AM   Additional Questions   Roomed by PAWEL Reina   Accompanied by daughter in law Billy       Review of Systems  CONSTITUTIONAL: NEGATIVE for fever, chills, change in  weight  ENT/MOUTH: NEGATIVE for ear, mouth and throat problems  RESP: NEGATIVE for significant cough or SOB  CV: NEGATIVE for chest pain/chest pressure      Objective    /74 (BP Location: Left arm, Patient Position: Sitting, Cuff Size: Adult Regular)   Pulse 88   Temp 98.2  F (36.8  C) (Oral)   Resp 16   Ht 1.524 m (5')   Wt 59.1 kg (130 lb 5 oz)   SpO2 96%   BMI 25.45 kg/m    Body mass index is 25.45 kg/m .  Physical Exam   GENERAL: alert and no distress  NECK: Supple  RESP: Lungs are clear today.  CV: regular rates and rhythm and no murmur, click or rub  ABDOMEN: soft, nontender, no hepatosplenomegaly, no masses and bowel sounds normal  MS: KNEES-no acute swelling, erythema or effusion.  PSYCH: mentation appears normal  Diabetic foot exam: no trophic changes or ulcerative lesions and normal sensory exam    This transcription uses voice recognition software, which may contain typographical errors.    The longitudinal plan of care for the diagnosis(es)/condition(s) as documented were addressed during this visit. Due to the added complexity in care, I will continue to support Kulwant in the subsequent management and with ongoing continuity of care.          Signed Electronically by: Adrien Cardoza MD

## 2024-05-24 LAB
CHOLEST SERPL-MCNC: 128 MG/DL
FASTING STATUS PATIENT QL REPORTED: NO
HDLC SERPL-MCNC: 63 MG/DL
LDLC SERPL CALC-MCNC: 38 MG/DL
NONHDLC SERPL-MCNC: 65 MG/DL
TRIGL SERPL-MCNC: 137 MG/DL

## 2024-06-07 ENCOUNTER — TELEPHONE (OUTPATIENT)
Dept: FAMILY MEDICINE | Facility: CLINIC | Age: 54
End: 2024-06-07
Payer: COMMERCIAL

## 2024-06-07 NOTE — TELEPHONE ENCOUNTER
Medication Question or Refill    What medication are you calling about (include dose and sig)?: omeprazole (PRILOSEC) 40 MG DR capsule     Preferred Pharmacy:  [facility] - [place], [place] - [address]  [address]  [address]  [place] [address]  Phone: [phone] Fax: [phone]      Controlled Substance Agreement on file:   CSA -- Patient Level:    CSA: None found at the patient level.       Who prescribed the medication?: Dr. Cardoza    Do you need a refill? Yes    When did you use the medication last? Yesterday 6/6/2024    Patient offered an appointment? No    Do you have any questions or concerns?  Yes: Pt went the pharmacy to  medication but pharmacy tolf them they have already picked up the medication. But pt has not.       Could we send this information to you in [facility] or would you prefer to receive a phone call?:   Patient would prefer a phone call   Okay to leave a detailed message?: Yes at Cell number on file:    Telephone Information:   Mobile [phone]

## 2024-06-10 ENCOUNTER — OFFICE VISIT (OUTPATIENT)
Dept: PHARMACY | Facility: CLINIC | Age: 54
End: 2024-06-10
Payer: COMMERCIAL

## 2024-06-10 ENCOUNTER — APPOINTMENT (OUTPATIENT)
Dept: LAB | Facility: CLINIC | Age: 54
End: 2024-06-10
Payer: COMMERCIAL

## 2024-06-10 VITALS
BODY MASS INDEX: 24.61 KG/M2 | WEIGHT: 126 LBS | DIASTOLIC BLOOD PRESSURE: 70 MMHG | HEART RATE: 98 BPM | SYSTOLIC BLOOD PRESSURE: 118 MMHG

## 2024-06-10 DIAGNOSIS — K59.00 CONSTIPATION, UNSPECIFIED CONSTIPATION TYPE: ICD-10-CM

## 2024-06-10 DIAGNOSIS — F33.9 RECURRENT MAJOR DEPRESSIVE DISORDER, REMISSION STATUS UNSPECIFIED (H): ICD-10-CM

## 2024-06-10 DIAGNOSIS — G89.29 CHRONIC NONINTRACTABLE HEADACHE, UNSPECIFIED HEADACHE TYPE: ICD-10-CM

## 2024-06-10 DIAGNOSIS — I10 ESSENTIAL HYPERTENSION: ICD-10-CM

## 2024-06-10 DIAGNOSIS — I31.9 PERICARDITIS, UNSPECIFIED CHRONICITY, UNSPECIFIED TYPE: ICD-10-CM

## 2024-06-10 DIAGNOSIS — M25.561 PAIN IN BOTH KNEES, UNSPECIFIED CHRONICITY: ICD-10-CM

## 2024-06-10 DIAGNOSIS — R51.9 CHRONIC NONINTRACTABLE HEADACHE, UNSPECIFIED HEADACHE TYPE: ICD-10-CM

## 2024-06-10 DIAGNOSIS — E78.5 HYPERLIPIDEMIA, UNSPECIFIED HYPERLIPIDEMIA TYPE: ICD-10-CM

## 2024-06-10 DIAGNOSIS — J45.50 SEVERE PERSISTENT ASTHMA, UNSPECIFIED WHETHER COMPLICATED (H): ICD-10-CM

## 2024-06-10 DIAGNOSIS — K21.9 GASTROESOPHAGEAL REFLUX DISEASE WITHOUT ESOPHAGITIS: Primary | ICD-10-CM

## 2024-06-10 DIAGNOSIS — M25.562 PAIN IN BOTH KNEES, UNSPECIFIED CHRONICITY: ICD-10-CM

## 2024-06-10 DIAGNOSIS — E11.9 TYPE 2 DIABETES MELLITUS TREATED WITH INSULIN (H): ICD-10-CM

## 2024-06-10 DIAGNOSIS — Z79.4 TYPE 2 DIABETES MELLITUS TREATED WITH INSULIN (H): ICD-10-CM

## 2024-06-10 LAB — HBA1C MFR BLD: 7.7 % (ref 0–5.6)

## 2024-06-10 PROCEDURE — 83036 HEMOGLOBIN GLYCOSYLATED A1C: CPT | Performed by: PHARMACIST

## 2024-06-10 PROCEDURE — 99606 MTMS BY PHARM EST 15 MIN: CPT | Performed by: PHARMACIST

## 2024-06-10 PROCEDURE — 36415 COLL VENOUS BLD VENIPUNCTURE: CPT | Performed by: PHARMACIST

## 2024-06-10 PROCEDURE — 99607 MTMS BY PHARM ADDL 15 MIN: CPT | Performed by: PHARMACIST

## 2024-06-10 RX ORDER — FAMOTIDINE 20 MG/1
20 TABLET, FILM COATED ORAL 2 TIMES DAILY
Qty: 120 TABLET | Refills: 0 | Status: SHIPPED | OUTPATIENT
Start: 2024-06-10 | End: 2024-07-25

## 2024-06-10 NOTE — PROGRESS NOTES
Medication Therapy Management (MTM) Encounter    ASSESSMENT:                            Medication Adherence/Access: No issues identified    1. Type 2 diabetes mellitus treated with insulin (H)  Patient is not meeting A1c goal of < 7% rechecked today pending at time of visit.  Patient is meeting goal of > 70% time in target with continuous glucose monitoring. Could consider titrating insulin in the future for further control or addition of GLP-1 RA or SGLT-2 for further blood sugar control with cardiorenal benefits.    2. Essential hypertension  BP at goal <130/80 mmHg.    Could consider discontinuing diuretic and alternatively reassessing need for additional antihypertensive.  If needed, would recommend adding ACE/ARB at low-dose and slowly titrating.    3. Hyperlipidemia, unspecified hyperlipidemia type  Stable.    4. Severe persistent asthma, unspecified whether complicated (H28)  Plan in placed with patient's pulmonologist and allergist.     5. Recurrent major depressive disorder, remission status unspecified (H24)  Stable.    6. Pain in both knees, unspecified chronicity  Stable.    7. Chronic nonintractable headache, unspecified headache type  Plan in place with patients neurologist.     8. Gastroesophageal reflux disease without esophagitis  Unable to refill PPI -- will substitute H2 blocker x2 months until she can refill Omeprazole.    9. Constipation, unspecified constipation type  Stable.     10. Pericarditis, unspecified chronicity, unspecified type  Stable.      PLAN:                            We called the pharmacy, and they have on file that a 3 month prescription of omeprazole was filled and picked up on 5/16/24. Since you can't find this at home, you can start famotidine 20mg twice daily for two months, then stop when you are able to refill the omeprazole in July.  A1c today.    Follow-up: Return in about 2 months (around 8/10/2024) for Medication Therapy Management.    SUBJECTIVE/OBJECTIVE:                           Kulwant Amin is a 54 year old female seen for a follow-up visit. Patient was accompanied by daughter in law. Patient seen with ID# [id number] - Van .      Reason for visit: MTM follow up.    Allergies/ADRs: Reviewed in chart  Past Medical History: Reviewed in chart  Tobacco: She reports that she quit smoking about 21 years ago. Her smoking use included cigarettes. She started smoking about 51 years ago. She has a 30 pack-year smoking history. She has never been exposed to tobacco smoke. She has never used smokeless tobacco.  Alcohol: none    Medication Adherence/Access: Patient has a home health nurse that sets up a three times daily pillbox. Patient brings with her medication vials today but does NOT bring her pillbox. Reports no missed doses.     Diabetes   Metformin  mg 2 tablets twice daily with food  Lantus 44 units once daily at night  Novolog 16 units twice daily 10 minutes before meals (not using the 20 units as prescribed because her blood sugar improved)  Report no missed doses of insulin. Daughter in law helps patient with insulin administration.   Aspirin 81mg daily  Reports no medication side effects, still does have upset stomach at times, but overall tolerating without concern.   Blood sugar monitoring: Freestyle Gemma CGM; see below.  Current diabetes symptoms: nonereported today  Diet/Exercise: Reports eating 2 meals per day, 11 am and 6:30-7, usually eats brown rice, beans, lentils, veggies.   Med Hx: No longer experiencing nausea/vomiting since switching metformin IR to ER.     Eye exam is up to date  Foot exam is up to date  Urine Albumin:   Lab Results   Component Value Date    UMALCR  11/07/2023      Comment:      Unable to calculate, urine albumin and/or urine creatinine is outside detectable limits.  Microalbuminuria is defined as an albumin:creatinine ratio of 17 to 299 for males and 25 to 299 for females. A ratio of albumin:creatinine of 300 or higher is  indicative of overt proteinuria.  Due to biologic variability, positive results should be confirmed by a second, first-morning random or 24-hour timed urine specimen. If there is discrepancy, a third specimen is recommended. When 2 out of 3 results are in the microalbuminuria range, this is evidence for incipient nephropathy and warrants increased efforts at glucose control, blood pressure control, and institution of therapy with an angiotensin-converting-enzyme (ACE) inhibitor (if the patient can tolerate it).        Lab Results   Component Value Date    A1C 10.9 (H) 01/22/2024          Hypertension /CAD/SVT  Hydrochlorothiazide 12.5 mg daily  Metoprolol tartrate 25 mg twice daily   Patient reports the following medication side effects: dizziness which has been the same as before. Reports feeling dizzy 1-2 times daily, but no longer taking meclizine.   Patient self-monitors blood pressure, 106-120 systolic.   Cardiology: Dr. Hirsch     BP Readings from Last 3 Encounters:   06/10/24 118/70   05/23/24 136/74   05/21/24 130/73     Pulse Readings from Last 3 Encounters:   06/10/24 98   05/23/24 88   05/21/24 88     Hyperlipidemia /Hx pontine stroke  Atorvastatin 80 mg daily  Aspirin 81 mg daily (per neurology)   Patient reports muscle pains bilaterally in her legs, but points to R knee. States that this has been present for a long time and her doctors are already aware of this.   The ASCVD Risk score (Doris CANDELARIO, et al., 2019) failed to calculate for the following reasons:    The patient has a prior MI or stroke diagnosis     Recent Labs   Lab Test 05/23/24  1054 03/14/23  1140   CHOL 128 125   HDL 63 41*   LDL 38 41   TRIG 137 214*     Allergies/COPD/asthma/hypereosinophilia:   Prednisone 20mg tablets - not currently taking, but has on hand  Montelukast 10 mg daily at bedtime  Breo Ellipta 200/25mcg 1 puff daily at night  Spiriva 2.5 mcg 2 puffs daily at night  Albuterol HFA daily as needed - taking daily right  now  Albuterol nebs three times daily   States that breathing has been the same, still having shortness of breath and wheezing.   Pulmonologist: [facility], Tamra Mohr.   Allergy & immunology: [facility], Dr. Elizabeth Marie.     Pain  Acetaminophen 500mg Q6h PRN pain - is only taking for severe headache or pain, see below  Gabapentin 600 mg three times daily   Ferrous sulfate 325 daily - per juan for leg cramps  Diclofenac 1% gel 4g 4 times daily - and this helps a lot  Thinks the leg pain is getting worse.   Medication History:  Tizanidine 2 mg three times daily prn - per juan for leg muscle spasms - does not have with her today 06/10/24   Hemoglobin   Date Value Ref Range Status   05/14/2024 12.9 11.7 - 15.7 g/dL Final   03/01/2024 13.8 11.7 - 15.7 g/dL Final   09/11/2015 13.7 11.7 - 15.7 g/dL Final   01/26/2015 12.2 11.7 - 15.7 g/dL Final     Mood  Fluoxetine 10 mg daily  Notes still some ups and downs with mood, but feels medication is helpful      2/15/2024    11:15 AM 3/28/2024     1:30 PM 5/23/2024    10:24 AM   PHQ   PHQ-9 Total Score 9 9 12   Q9: Thoughts of better off dead/self-harm past 2 weeks Not at all Not at all Not at all     Migraine/headache  Amitriptyline 50 mg daily at bedtime  Acetaminophen three times daily as needed for headaches, taking every day  Seeing neurologist, Dr. Prescott.   Emgality every 28 days- currently on hold x2 months    GERD  Omeprazole 40 mg daily in the morning - has been out for almost a month. We called the pharmacy and they report it was picked up on 5/16/23 for a 3 month supply. Family does not have it at home, willing to take famotidine until they can refill omprazole on 7/28/24  Sucralfate 1g 3 times daily (does not bring with today but still taking)  Tums 1 tablet twice daily PRN   Ondansetron - states they ran out of this.  Reports well controlled symptoms with current regimen.     Constipation  Docusate 100 mg 1 caps twice daily  PRN  Miralax 17 g daily  Thinks current therapy is helpful, no concerns.     Pericarditis    Colchicine 0.6 mg daily (started 7/24)  Reports improvement of chest pain.       Today's Vitals: /70   Pulse 98   Wt 126 lb (57.2 kg)   BMI 24.61 kg/m    ----------------      I spent 60 minutes with this patient today. All changes were made via collaborative practice agreement with Adrien Cardoza MD. A copy of the visit note was provided to the patient's provider(s).    A summary of these recommendations was declined by the patient.    Rajani Link, Pharm.D., [facility]  Medication Therapy Management Pharmacist  [phone]      Medication Therapy Recommendations  GERD (gastroesophageal reflux disease)    Current Medication: omeprazole (PRILOSEC) 40 MG DR capsule   Rationale: Medication product not available - Adherence - Adherence   Recommendation: Change Medication - famotidine 20 MG tablet   Status: Accepted per CPA

## 2024-06-10 NOTE — PATIENT INSTRUCTIONS
Recommendations from today's MTM visit:                                                      We called the pharmacy, and they have on file that a 3 month prescription of omeprazole was filled and picked up on 5/16/24. Since you can't find this at home, you can start famotidine 20mg twice daily for two months, then stop when you are able to refill the omeprazole in July.  A1c today.    Follow-up: Return in about 2 months (around 8/10/2024) for Medication Therapy Management.    It was great to speak with you today.  I value your experience and would be very thankful for your time with providing feedback on our clinic survey. You may receive a survey via email or text message in the next few days.     To schedule another MTM appointment, please call the clinic directly or you may call the scheduling line at [phone].    My Clinical Pharmacist's contact information:                                                      Please feel free to contact me with any questions or concerns you have.      Rajani Link, Pharm.D., BCACP  Medication Therapy Management Pharmacist  [phone]

## 2024-06-11 NOTE — TELEPHONE ENCOUNTER
Called pharmacy to clarify when pt picked it up. Pharmacy stated that Billy (daughter-in law) picked Rx up on 5/15. Pt is due for a refill on 7/28.    Called pt to clarify. Spoke to Billy and Billy stated she did not pick Rx up. Writer informed her that pharmacy have on file that she picked Rx up on 5/15 and RX is for a 3 month supply. Billy then stated they actually misplaced Rx instead and wanted a new order.    However, pt saw the pharmacist yesterday and famotidine (PEPCID) 20 MG tablet was sent for pt to take until they can get a refill.      No further cation needed.      Claude Wilson Cem Say, BSN RN  [facility]

## 2024-06-15 ENCOUNTER — APPOINTMENT (OUTPATIENT)
Dept: CT IMAGING | Facility: HOSPITAL | Age: 54
End: 2024-06-15
Attending: STUDENT IN AN ORGANIZED HEALTH CARE EDUCATION/TRAINING PROGRAM
Payer: COMMERCIAL

## 2024-06-15 ENCOUNTER — HOSPITAL ENCOUNTER (EMERGENCY)
Facility: HOSPITAL | Age: 54
Discharge: HOME | End: 2024-06-16
Attending: STUDENT IN AN ORGANIZED HEALTH CARE EDUCATION/TRAINING PROGRAM | Admitting: STUDENT IN AN ORGANIZED HEALTH CARE EDUCATION/TRAINING PROGRAM
Payer: COMMERCIAL

## 2024-06-15 DIAGNOSIS — R00.2 PALPITATIONS: ICD-10-CM

## 2024-06-15 LAB
ALBUMIN SERPL BCG-MCNC: 4.4 G/DL (ref 3.5–5.2)
ALP SERPL-CCNC: 104 U/L (ref 40–150)
ALT SERPL W P-5'-P-CCNC: 34 U/L (ref 0–50)
ANION GAP SERPL CALCULATED.3IONS-SCNC: 11 MMOL/L (ref 7–15)
AST SERPL W P-5'-P-CCNC: 41 U/L (ref 0–45)
BASOPHILS # BLD AUTO: 0 10E3/UL (ref 0–0.2)
BASOPHILS NFR BLD AUTO: 0 %
BILIRUB SERPL-MCNC: 0.3 MG/DL
BUN SERPL-MCNC: 8.7 MG/DL (ref 6–20)
CALCIUM SERPL-MCNC: 9.4 MG/DL (ref 8.6–10)
CHLORIDE SERPL-SCNC: 104 MMOL/L (ref 98–107)
CREAT SERPL-MCNC: 0.55 MG/DL (ref 0.51–0.95)
DEPRECATED HCO3 PLAS-SCNC: 24 MMOL/L (ref 22–29)
EGFRCR SERPLBLD CKD-EPI 2021: >90 ML/MIN/1.73M2
EOSINOPHIL # BLD AUTO: 0.3 10E3/UL (ref 0–0.7)
EOSINOPHIL NFR BLD AUTO: 5 %
ERYTHROCYTE [DISTWIDTH] IN BLOOD BY AUTOMATED COUNT: 15.3 % (ref 10–15)
GLUCOSE SERPL-MCNC: 104 MG/DL (ref 70–99)
HCT VFR BLD AUTO: 40.9 % (ref 35–47)
HGB BLD-MCNC: 12.8 G/DL (ref 11.7–15.7)
IMM GRANULOCYTES # BLD: 0 10E3/UL
IMM GRANULOCYTES NFR BLD: 0 %
LYMPHOCYTES # BLD AUTO: 2.5 10E3/UL (ref 0.8–5.3)
LYMPHOCYTES NFR BLD AUTO: 34 %
MAGNESIUM SERPL-MCNC: 2 MG/DL (ref 1.7–2.3)
MCH RBC QN AUTO: 26.3 PG (ref 26.5–33)
MCHC RBC AUTO-ENTMCNC: 31.3 G/DL (ref 31.5–36.5)
MCV RBC AUTO: 84 FL (ref 78–100)
MONOCYTES # BLD AUTO: 0.6 10E3/UL (ref 0–1.3)
MONOCYTES NFR BLD AUTO: 9 %
NEUTROPHILS # BLD AUTO: 3.8 10E3/UL (ref 1.6–8.3)
NEUTROPHILS NFR BLD AUTO: 52 %
NRBC # BLD AUTO: 0 10E3/UL
NRBC BLD AUTO-RTO: 0 /100
NT-PROBNP SERPL-MCNC: 83 PG/ML (ref 0–900)
PLATELET # BLD AUTO: 196 10E3/UL (ref 150–450)
POTASSIUM SERPL-SCNC: 3.9 MMOL/L (ref 3.4–5.3)
PROT SERPL-MCNC: 7.3 G/DL (ref 6.4–8.3)
RBC # BLD AUTO: 4.87 10E6/UL (ref 3.8–5.2)
SODIUM SERPL-SCNC: 139 MMOL/L (ref 135–145)
TROPONIN T SERPL HS-MCNC: <6 NG/L
WBC # BLD AUTO: 7.3 10E3/UL (ref 4–11)

## 2024-06-15 PROCEDURE — 36415 COLL VENOUS BLD VENIPUNCTURE: CPT | Performed by: STUDENT IN AN ORGANIZED HEALTH CARE EDUCATION/TRAINING PROGRAM

## 2024-06-15 PROCEDURE — 96374 THER/PROPH/DIAG INJ IV PUSH: CPT | Mod: 59

## 2024-06-15 PROCEDURE — 96375 TX/PRO/DX INJ NEW DRUG ADDON: CPT | Mod: 59

## 2024-06-15 PROCEDURE — 71275 CT ANGIOGRAPHY CHEST: CPT

## 2024-06-15 PROCEDURE — 250N000011 HC RX IP 250 OP 636: Mod: JZ | Performed by: STUDENT IN AN ORGANIZED HEALTH CARE EDUCATION/TRAINING PROGRAM

## 2024-06-15 PROCEDURE — 84484 ASSAY OF TROPONIN QUANT: CPT | Performed by: STUDENT IN AN ORGANIZED HEALTH CARE EDUCATION/TRAINING PROGRAM

## 2024-06-15 PROCEDURE — C9113 INJ PANTOPRAZOLE SODIUM, VIA: HCPCS | Mod: JZ | Performed by: STUDENT IN AN ORGANIZED HEALTH CARE EDUCATION/TRAINING PROGRAM

## 2024-06-15 PROCEDURE — 83735 ASSAY OF MAGNESIUM: CPT | Performed by: STUDENT IN AN ORGANIZED HEALTH CARE EDUCATION/TRAINING PROGRAM

## 2024-06-15 PROCEDURE — 83880 ASSAY OF NATRIURETIC PEPTIDE: CPT | Performed by: STUDENT IN AN ORGANIZED HEALTH CARE EDUCATION/TRAINING PROGRAM

## 2024-06-15 PROCEDURE — 99285 EMERGENCY DEPT VISIT HI MDM: CPT | Mod: 25

## 2024-06-15 PROCEDURE — 82040 ASSAY OF SERUM ALBUMIN: CPT | Performed by: STUDENT IN AN ORGANIZED HEALTH CARE EDUCATION/TRAINING PROGRAM

## 2024-06-15 PROCEDURE — 93005 ELECTROCARDIOGRAM TRACING: CPT | Performed by: STUDENT IN AN ORGANIZED HEALTH CARE EDUCATION/TRAINING PROGRAM

## 2024-06-15 PROCEDURE — 85004 AUTOMATED DIFF WBC COUNT: CPT | Performed by: STUDENT IN AN ORGANIZED HEALTH CARE EDUCATION/TRAINING PROGRAM

## 2024-06-15 RX ORDER — ONDANSETRON 2 MG/ML
4 INJECTION INTRAMUSCULAR; INTRAVENOUS ONCE
Status: COMPLETED | OUTPATIENT
Start: 2024-06-15 | End: 2024-06-15

## 2024-06-15 RX ADMIN — ONDANSETRON 4 MG: 2 INJECTION INTRAMUSCULAR; INTRAVENOUS at 23:17

## 2024-06-15 RX ADMIN — PANTOPRAZOLE SODIUM 40 MG: 40 INJECTION, POWDER, FOR SOLUTION INTRAVENOUS at 23:20

## 2024-06-15 ASSESSMENT — ACTIVITIES OF DAILY LIVING (ADL): ADLS_ACUITY_SCORE: 38

## 2024-06-16 VITALS
DIASTOLIC BLOOD PRESSURE: 56 MMHG | TEMPERATURE: 97.8 F | HEART RATE: 75 BPM | RESPIRATION RATE: 17 BRPM | WEIGHT: 129.19 LBS | OXYGEN SATURATION: 99 % | SYSTOLIC BLOOD PRESSURE: 116 MMHG | BODY MASS INDEX: 25.23 KG/M2

## 2024-06-16 LAB
FLUAV RNA SPEC QL NAA+PROBE: NEGATIVE
FLUBV RNA RESP QL NAA+PROBE: NEGATIVE
HOLD SPECIMEN: NORMAL
HOLD SPECIMEN: NORMAL
RSV RNA SPEC NAA+PROBE: NEGATIVE
SARS-COV-2 RNA RESP QL NAA+PROBE: NEGATIVE
TROPONIN T SERPL HS-MCNC: 12 NG/L
TROPONIN T SERPL HS-MCNC: <6 NG/L

## 2024-06-16 PROCEDURE — 84484 ASSAY OF TROPONIN QUANT: CPT | Mod: 91 | Performed by: STUDENT IN AN ORGANIZED HEALTH CARE EDUCATION/TRAINING PROGRAM

## 2024-06-16 PROCEDURE — 250N000013 HC RX MED GY IP 250 OP 250 PS 637: Performed by: STUDENT IN AN ORGANIZED HEALTH CARE EDUCATION/TRAINING PROGRAM

## 2024-06-16 PROCEDURE — 87637 SARSCOV2&INF A&B&RSV AMP PRB: CPT | Performed by: STUDENT IN AN ORGANIZED HEALTH CARE EDUCATION/TRAINING PROGRAM

## 2024-06-16 PROCEDURE — 36415 COLL VENOUS BLD VENIPUNCTURE: CPT | Performed by: STUDENT IN AN ORGANIZED HEALTH CARE EDUCATION/TRAINING PROGRAM

## 2024-06-16 PROCEDURE — 250N000011 HC RX IP 250 OP 636: Performed by: STUDENT IN AN ORGANIZED HEALTH CARE EDUCATION/TRAINING PROGRAM

## 2024-06-16 RX ORDER — MAGNESIUM HYDROXIDE/ALUMINUM HYDROXICE/SIMETHICONE 120; 1200; 1200 MG/30ML; MG/30ML; MG/30ML
15 SUSPENSION ORAL ONCE
Status: COMPLETED | OUTPATIENT
Start: 2024-06-16 | End: 2024-06-16

## 2024-06-16 RX ORDER — IOPAMIDOL 755 MG/ML
72 INJECTION, SOLUTION INTRAVASCULAR ONCE
Status: COMPLETED | OUTPATIENT
Start: 2024-06-16 | End: 2024-06-16

## 2024-06-16 RX ADMIN — ALUMINUM HYDROXIDE, MAGNESIUM HYDROXIDE, AND SIMETHICONE 15 ML: 1200; 120; 1200 SUSPENSION ORAL at 03:54

## 2024-06-16 RX ADMIN — IOPAMIDOL 72 ML: 755 INJECTION, SOLUTION INTRAVENOUS at 00:10

## 2024-06-16 ASSESSMENT — ACTIVITIES OF DAILY LIVING (ADL)
ADLS_ACUITY_SCORE: 38

## 2024-06-16 NOTE — DISCHARGE INSTRUCTIONS
Your workup today was negative.  I suspect you have a viral syndrome.  No signs of heart attack, pneumonia or heart failure.    Please continue to take your home medications.    Return here for any worsening symptoms.

## 2024-06-16 NOTE — ED TRIAGE NOTES
Pt arrives via private car with her male relative. She has had cough, vomiting, palpitations, and chest burning x 4days. Pt brought back to room 34 and is getting EKG.     Triage Assessment (Adult)       Row Name 06/15/24 4747          Triage Assessment    Airway WDL WDL        Respiratory WDL    Respiratory WDL X;cough     Cough Frequency frequent        Skin Circulation/Temperature WDL    Skin Circulation/Temperature WDL WDL        Cardiac WDL    Cardiac WDL X  palpitations when vomiting/chest burning        Peripheral/Neurovascular WDL    Peripheral Neurovascular WDL WDL        Cognitive/Neuro/Behavioral WDL    Cognitive/Neuro/Behavioral WDL WDL

## 2024-06-16 NOTE — ED NOTES
"Pt reports \"burning\" throughout her chest cavity and goes up into her throat. Denies any chest pressure or chest pain necessarily. Has been on going for 4 days. Associated with vomiting. Pt denies feeling SOB but states it sometimes feels like \"something is stuck in her throat\".  Continues on monitors. 2 EKGs completed. Labs in process.   "

## 2024-06-16 NOTE — ED PROVIDER NOTES
Emergency Department Encounter         FINAL IMPRESSION:  NAUSEA, PALPITATIONS        ED COURSE AND MEDICAL DECISION MAKING       ED Course as of 06/16/24 0615   Sat Enrique 15, 2024   2320 EKG is sinus rate of 69, she does have some subtle changes in inferior leads with no reciprocal changes.  These appear to be potentially new when compared to May 2024.  QTc today is 437.  Will repeat in 10 to 15 minutes.   2321 Patient is a 54-year-old female with a history of COPD, hypertension, hyperlipidemia, uncontrolled diabetes, CAD, here from home with multiple symptoms for last 4 days.  Patient reports that she started having viral-like symptoms 4 days ago including cough, congestion, sore throat fevers.  That progressed to vomiting yesterday and a sensation of acid starting from her epigastrium rating up into her throat with some sore throat.   2331 Recheck on patient, states that she feels much better after Zofran and PPI.  No active chest pain.  Will await the troponin.    EKG #2 showing still some subtle changes in inferior leads however with no reciprocal changes, active chest pain, will hold off on calling a level 1 at this time.       For troponin unremarkable however because of her medical history is still very subtle chest discomfort we will second troponin her.  - Second troponin is 12.  Hard to tell with the first troponin was considering was less than 6, will repeat third  - Patient reports that she feels significantly improved.  Third troponin is less than 6.  Will discharge home.  Has no other symptoms of suggest ACS with no dyspnea on exertion, leg swelling.  CT PE showing no focality.  I suspect patient is a viral syndrome causing her nausea vomiting and most likely reflux sensation in her throat and she describes as a burning reflux sensation from her epigastrium.                   Medical Decision Making  Obtained supplemental history:Supplemental history obtained?: Documented in chart and Family  Member/Significant Other  Reviewed external records: External records reviewed?: Documented in chart and Outpatient Record: Office visit on 6/10/24  Care impacted by chronic illness:Diabetes, Heart Disease, and Hypertension  Care significantly affected by social determinants of health:Access to Medical Care  Did you consider but not order tests?: Work up considered but not performed and documented in chart, if applicable  Did you interpret images independently?: Independent interpretation of ECG and images noted in documentation, when applicable.  Consultation discussion with other provider:Did you involve another provider (consultant, , pharmacy, etc.)?: No  Discharge. I prescribed additional prescription strength medication(s) as charted. See documentation for any additional details.                  EKG:      I independently reviewed and interpreted the patient's EKG, with comments made as listed above.    Please see scanned EKG for full report.       Critical Care     Performed by: Sulaiman Ramirez or    Authorized by: Sulaiman Ramirez  Total critical care time:  minutes  Critical care was necessary to treat or prevent imminent or life-threatening deterioration of the following conditions:   Critical care was time spent personally by me on the following activities: development of treatment plan with patient or surrogate, discussions with consultants, examination of patient, evaluation of patient's response to treatment, obtaining history from patient or surrogate, ordering and performing treatments and interventions, ordering and review of laboratory studies, ordering and review of radiographic studies, re-evaluation of patient's condition and monitoring for potential decompensation.  Critical care time was exclusive of separately billable procedures and treating other patients.'        At the conclusion of the encounter I discussed the results of all the tests and the disposition. The questions were answered. The patient or  "family acknowledged understanding and was agreeable with the care plan.                  MEDICATIONS GIVEN IN THE EMERGENCY DEPARTMENT:  Medications   ondansetron (ZOFRAN) injection 4 mg (4 mg Intravenous $Given 6/15/24 7559)   pantoprazole (PROTONIX) IV push injection 40 mg (40 mg Intravenous $Given 6/15/24 9350)       NEW PRESCRIPTIONS STARTED AT TODAY'S ED VISIT:  New Prescriptions    No medications on file       HPI     Patient information obtained from: Patient and patient's son     Use of Interpretor: Yes (In Person) - Language Mohawk    Kulwant JEANNINE Amin is a 54 year old female with a pertinent history of CAD, s/p coronary artery stent placement, FLACO, COPD, asthma, latent TB, type 2 diabetes, hypertension, dysphagia, GERD, who presents to this ED via private vehicle with her son for evaluation of multiple complaints    Per chart review: The patient was seen in clinic for GERD on 6/10/2024.  Patient's A1c elevated at 7.7.  Patient's blood pressure at goal at 118/70.  Patient prescribed Pepcid for GERD.    Patient reports 4 days ago she developed flu symptoms with cough, fever, runny nose, and chills.  Yesterday, the patient developed \"burning\" upper chest pain that feels like acid is stuck in her esophagus.  The patient has a history of GERD and reports this feels similar to past GERD flare ups.  Patient reports a few episodes of emesis last night and this morning as well.  Patient also reports palpitations and states that her heart rate will go fast \"all of a sudden\".  Patient denies any back pain, hematemesis, black or bloody stool, diarrhea, constipation, urinary symptoms, or any other symptoms or complaints.    MEDICAL HISTORY     Past Medical History:   Diagnosis Date    Acute asthma exacerbation 01/06/2020    Anxiety     Arthritis     Asthma exacerbation 11/19/2015    Chronic obstructive pulmonary disease, unspecified COPD type (H)     COPD (chronic obstructive pulmonary disease) (H)     Coronary artery " disease due to lipid rich plaque     Depression     Epigastric pain 12/15/2021    Essential hypertension     GERD (gastroesophageal reflux disease)     Infection due to 2019 novel coronavirus 2022    Latent tuberculosis 2019    Microcytic anemia 2019    S/P coronary artery stent placement 2018    TB lung, latent        Past Surgical History:   Procedure Laterality Date    CORONARY STENT PLACEMENT      CV CORONARY ANGIOGRAM N/A 2018    Procedure: Coronary Angiogram;  Surgeon: Angelo Serrano MD;  Location: [facility] Cath Lab;  Service:     CV CORONARY ANGIOGRAM N/A 2022    Procedure: Coronary Angiogram;  Surgeon: Irwin Cano MD;  Location: [facility] CATH LAB CV    CV CORONARY ANGIOGRAM  2022    Procedure: ;  Surgeon: Irwin Cano MD;  Location: [facility] LAB CV    TX ESOPHAGOGASTRODUODENOSCOPY TRANSORAL DIAGNOSTIC N/A 12/10/2018    Procedure: ESOPHAGOGASTRODUODENOSCOPY (EGD);  Surgeon: Eddie Renteria MD;  Location: [facility];  Service: Gastroenterology    TX ESOPHAGOGASTRODUODENOSCOPY TRANSORAL DIAGNOSTIC N/A 12/3/2020    Procedure: ESOPHAGOGASTRODUODENOSCOPY (EGD) with biospies ;  Surgeon: Avi Crow MD;  Location: [facility];  Service: Gastroenterology    [facility] COLONOSCOPY W/WO BRUSH/WASH N/A 12/10/2018    Procedure: COLONOSCOPY with polypectomy using biopsy forceps;  Surgeon: Eddie Renteria MD;  Location: [facility];  Service: Gastroenterology       Social History     Tobacco Use    Smoking status: Former     Current packs/day: 0.00     Average packs/day: 1 pack/day for 30.0 years (30.0 ttl pk-yrs)     Types: Cigarettes     Start date: 1973     Quit date: 2003     Years since quittin.4     Passive exposure: Never    Smokeless tobacco: Never    Tobacco comments:     No passive exposure   Vaping Use    Vaping status: Never Used   Substance Use Topics    Alcohol use: No    Drug use: No       acetaminophen (TYLENOL) 500 MG  tablet  albuterol (PROAIR HFA/PROVENTIL HFA/VENTOLIN HFA) 108 (90 Base) MCG/ACT inhaler  albuterol (PROVENTIL) (2.5 MG/3ML) 0.083% neb solution  amitriptyline (ELAVIL) 50 MG tablet  aspirin (ASPIRIN LOW DOSE) 81 MG EC tablet  atorvastatin (LIPITOR) 80 MG tablet  colchicine (COLCRYS) 0.6 MG tablet  Continuous Blood Gluc Sensor (FREESTYLE MONICA 2 SENSOR) MISC  diclofenac (VOLTAREN) 1 % topical gel  docusate sodium (COLACE) 100 MG capsule  famotidine (PEPCID) 20 MG tablet  ferrous sulfate (FEROSUL) 325 (65 Fe) MG tablet  fluticasone-vilanterol (BREO ELLIPTA) 200-25 MCG/ACT inhaler  gabapentin (NEURONTIN) 600 MG tablet  hydrochlorothiazide (MICROZIDE) 12.5 MG capsule  insulin aspart (NOVOLOG PEN) 100 UNIT/ML pen  insulin glargine (LANTUS PEN) 100 UNIT/ML pen  insulin pen needle (32G X 4 MM) 32G X 4 MM miscellaneous  Lidocaine (LIDOCARE) 4 % Patch  metFORMIN (GLUCOPHAGE XR) 500 MG 24 hr tablet  metoprolol tartrate (LOPRESSOR) 25 MG tablet  montelukast (SINGULAIR) 10 MG tablet  omeprazole (PRILOSEC) 40 MG DR capsule  polyethylene glycol (MIRALAX) 17 GM/Dose powder  predniSONE (DELTASONE) 10 MG tablet  predniSONE (DELTASONE) 20 MG tablet  sucralfate (CARAFATE) 1 GM/10ML suspension  tiotropium (SPIRIVA RESPIMAT) 2.5 MCG/ACT inhaler            PHYSICAL EXAM     BP (!) 151/71   Pulse 69   Temp 97.8  F (36.6  C) (Oral)   Resp 21   Wt 58.6 kg (129 lb 3 oz)   SpO2 99%   BMI 25.23 kg/m        PHYSICAL EXAM:     General: Patient appears well, nontoxic, comfortable  HEENT: Moist mucous membranes,  No head trauma.    Cardiovascular: Normal rate, normal rhythm, no extremity edema.  No appreciable murmur.  Respiratory: No signs of respiratory distress, lungs are clear to auscultation bilaterally with no wheezes rhonchi or rales.  Abdominal: Soft, nontender, nondistended, no palpable masses, no guarding, no rebound  Musculoskeletal: Full range of motion of joints, no deformities appreciated.  Neurological: Alert and oriented,  grossly neurologically intact.  Psychological: Normal affect and mood.  Integument: No rashes appreciated          RESULTS       Labs Ordered and Resulted from Time of ED Arrival to Time of ED Departure   CBC WITH PLATELETS AND DIFFERENTIAL - Abnormal       Result Value    WBC Count 7.3      RBC Count 4.87      Hemoglobin 12.8      Hematocrit 40.9      MCV 84      MCH 26.3 (*)     MCHC 31.3 (*)     RDW 15.3 (*)     Platelet Count 196      % Neutrophils 52      % Lymphocytes 34      % Monocytes 9      % Eosinophils 5      % Basophils 0      % Immature Granulocytes 0      NRBCs per 100 WBC 0      Absolute Neutrophils 3.8      Absolute Lymphocytes 2.5      Absolute Monocytes 0.6      Absolute Eosinophils 0.3      Absolute Basophils 0.0      Absolute Immature Granulocytes 0.0      Absolute NRBCs 0.0     COMPREHENSIVE METABOLIC PANEL   TROPONIN T, HIGH SENSITIVITY   MAGNESIUM   NT PROBNP INPATIENT       CT Chest Pulmonary Embolism w Contrast    (Results Pending)                     PROCEDURES:  Procedures:  Procedures       I, Amanda Hager am serving as a scribe to document services personally performed by Sulaiman Ramirez DO, based on my observations and the provider's statements to me.  I, Sulaiman Ramirez DO, attest that Amanda Hager is acting in a scribe capacity, has observed my performance of the services and has documented them in accordance with my direction.    Sulaiman aRmirez DO  Emergency Medicine  [facility] EMERGENCY DEPARTMENT       Sulaiman Ramirez DO  06/16/24 0616

## 2024-06-18 LAB
ATRIAL RATE - MUSE: 69 BPM
ATRIAL RATE - MUSE: 70 BPM
DIASTOLIC BLOOD PRESSURE - MUSE: 71 MMHG
DIASTOLIC BLOOD PRESSURE - MUSE: 81 MMHG
INTERPRETATION ECG - MUSE: NORMAL
INTERPRETATION ECG - MUSE: NORMAL
P AXIS - MUSE: 28 DEGREES
P AXIS - MUSE: 61 DEGREES
PR INTERVAL - MUSE: 152 MS
PR INTERVAL - MUSE: 172 MS
QRS DURATION - MUSE: 90 MS
QRS DURATION - MUSE: 94 MS
QT - MUSE: 408 MS
QT - MUSE: 418 MS
QTC - MUSE: 437 MS
QTC - MUSE: 451 MS
R AXIS - MUSE: -13 DEGREES
R AXIS - MUSE: -4 DEGREES
SYSTOLIC BLOOD PRESSURE - MUSE: 151 MMHG
SYSTOLIC BLOOD PRESSURE - MUSE: 152 MMHG
T AXIS - MUSE: 69 DEGREES
T AXIS - MUSE: 70 DEGREES
VENTRICULAR RATE- MUSE: 69 BPM
VENTRICULAR RATE- MUSE: 70 BPM

## 2024-07-03 DIAGNOSIS — F41.9 ANXIETY: ICD-10-CM

## 2024-07-05 DIAGNOSIS — K59.00 CONSTIPATION, UNSPECIFIED CONSTIPATION TYPE: ICD-10-CM

## 2024-07-05 RX ORDER — POLYETHYLENE GLYCOL 3350 17 G/17G
POWDER, FOR SOLUTION ORAL
Qty: 510 G | Refills: 3 | OUTPATIENT
Start: 2024-07-05

## 2024-07-09 ENCOUNTER — APPOINTMENT (OUTPATIENT)
Dept: CT IMAGING | Facility: HOSPITAL | Age: 54
End: 2024-07-09
Attending: STUDENT IN AN ORGANIZED HEALTH CARE EDUCATION/TRAINING PROGRAM
Payer: COMMERCIAL

## 2024-07-09 ENCOUNTER — APPOINTMENT (OUTPATIENT)
Dept: RADIOLOGY | Facility: HOSPITAL | Age: 54
End: 2024-07-09
Attending: STUDENT IN AN ORGANIZED HEALTH CARE EDUCATION/TRAINING PROGRAM
Payer: COMMERCIAL

## 2024-07-09 ENCOUNTER — HOSPITAL ENCOUNTER (EMERGENCY)
Facility: HOSPITAL | Age: 54
Discharge: HOME | End: 2024-07-09
Attending: STUDENT IN AN ORGANIZED HEALTH CARE EDUCATION/TRAINING PROGRAM | Admitting: STUDENT IN AN ORGANIZED HEALTH CARE EDUCATION/TRAINING PROGRAM
Payer: COMMERCIAL

## 2024-07-09 VITALS
OXYGEN SATURATION: 95 % | DIASTOLIC BLOOD PRESSURE: 58 MMHG | RESPIRATION RATE: 19 BRPM | HEART RATE: 73 BPM | TEMPERATURE: 97.7 F | SYSTOLIC BLOOD PRESSURE: 124 MMHG

## 2024-07-09 DIAGNOSIS — R07.9 CHEST PAIN, UNSPECIFIED TYPE: ICD-10-CM

## 2024-07-09 DIAGNOSIS — K21.00 GASTROESOPHAGEAL REFLUX DISEASE WITH ESOPHAGITIS WITHOUT HEMORRHAGE: ICD-10-CM

## 2024-07-09 LAB
ALBUMIN SERPL BCG-MCNC: 3.8 G/DL (ref 3.5–5.2)
ALP SERPL-CCNC: 93 U/L (ref 40–150)
ALT SERPL W P-5'-P-CCNC: 31 U/L (ref 0–50)
ANION GAP SERPL CALCULATED.3IONS-SCNC: 13 MMOL/L (ref 7–15)
AST SERPL W P-5'-P-CCNC: 31 U/L (ref 0–45)
ATRIAL RATE - MUSE: 77 BPM
BASOPHILS # BLD AUTO: 0 10E3/UL (ref 0–0.2)
BASOPHILS NFR BLD AUTO: 1 %
BILIRUB DIRECT SERPL-MCNC: <0.2 MG/DL (ref 0–0.3)
BILIRUB SERPL-MCNC: 0.4 MG/DL
BUN SERPL-MCNC: 13.6 MG/DL (ref 6–20)
CALCIUM SERPL-MCNC: 9.2 MG/DL (ref 8.6–10)
CHLORIDE SERPL-SCNC: 103 MMOL/L (ref 98–107)
CREAT SERPL-MCNC: 0.5 MG/DL (ref 0.51–0.95)
DEPRECATED HCO3 PLAS-SCNC: 24 MMOL/L (ref 22–29)
DIASTOLIC BLOOD PRESSURE - MUSE: 77 MMHG
EGFRCR SERPLBLD CKD-EPI 2021: >90 ML/MIN/1.73M2
EOSINOPHIL # BLD AUTO: 0.3 10E3/UL (ref 0–0.7)
EOSINOPHIL NFR BLD AUTO: 5 %
ERYTHROCYTE [DISTWIDTH] IN BLOOD BY AUTOMATED COUNT: 15.1 % (ref 10–15)
GLUCOSE SERPL-MCNC: 123 MG/DL (ref 70–99)
HCT VFR BLD AUTO: 38.2 % (ref 35–47)
HGB BLD-MCNC: 12.3 G/DL (ref 11.7–15.7)
IMM GRANULOCYTES # BLD: 0 10E3/UL
IMM GRANULOCYTES NFR BLD: 0 %
INTERPRETATION ECG - MUSE: NORMAL
LIPASE SERPL-CCNC: 47 U/L (ref 13–60)
LYMPHOCYTES # BLD AUTO: 1.7 10E3/UL (ref 0.8–5.3)
LYMPHOCYTES NFR BLD AUTO: 28 %
MCH RBC QN AUTO: 26.9 PG (ref 26.5–33)
MCHC RBC AUTO-ENTMCNC: 32.2 G/DL (ref 31.5–36.5)
MCV RBC AUTO: 83 FL (ref 78–100)
MONOCYTES # BLD AUTO: 0.5 10E3/UL (ref 0–1.3)
MONOCYTES NFR BLD AUTO: 8 %
NEUTROPHILS # BLD AUTO: 3.6 10E3/UL (ref 1.6–8.3)
NEUTROPHILS NFR BLD AUTO: 59 %
NRBC # BLD AUTO: 0 10E3/UL
NRBC BLD AUTO-RTO: 0 /100
NT-PROBNP SERPL-MCNC: 125 PG/ML (ref 0–900)
P AXIS - MUSE: 52 DEGREES
PLATELET # BLD AUTO: 178 10E3/UL (ref 150–450)
POTASSIUM SERPL-SCNC: 3.8 MMOL/L (ref 3.4–5.3)
PR INTERVAL - MUSE: 164 MS
PROT SERPL-MCNC: 6.7 G/DL (ref 6.4–8.3)
QRS DURATION - MUSE: 86 MS
QT - MUSE: 398 MS
QTC - MUSE: 450 MS
R AXIS - MUSE: -31 DEGREES
RBC # BLD AUTO: 4.58 10E6/UL (ref 3.8–5.2)
SODIUM SERPL-SCNC: 140 MMOL/L (ref 135–145)
SYSTOLIC BLOOD PRESSURE - MUSE: 140 MMHG
T AXIS - MUSE: 55 DEGREES
TROPONIN T SERPL HS-MCNC: <6 NG/L
VENTRICULAR RATE- MUSE: 77 BPM
WBC # BLD AUTO: 6.1 10E3/UL (ref 4–11)

## 2024-07-09 PROCEDURE — 82310 ASSAY OF CALCIUM: CPT | Performed by: STUDENT IN AN ORGANIZED HEALTH CARE EDUCATION/TRAINING PROGRAM

## 2024-07-09 PROCEDURE — 83690 ASSAY OF LIPASE: CPT | Performed by: STUDENT IN AN ORGANIZED HEALTH CARE EDUCATION/TRAINING PROGRAM

## 2024-07-09 PROCEDURE — 85025 COMPLETE CBC W/AUTO DIFF WBC: CPT | Performed by: STUDENT IN AN ORGANIZED HEALTH CARE EDUCATION/TRAINING PROGRAM

## 2024-07-09 PROCEDURE — 84484 ASSAY OF TROPONIN QUANT: CPT | Performed by: STUDENT IN AN ORGANIZED HEALTH CARE EDUCATION/TRAINING PROGRAM

## 2024-07-09 PROCEDURE — 250N000009 HC RX 250: Performed by: STUDENT IN AN ORGANIZED HEALTH CARE EDUCATION/TRAINING PROGRAM

## 2024-07-09 PROCEDURE — 82247 BILIRUBIN TOTAL: CPT | Performed by: STUDENT IN AN ORGANIZED HEALTH CARE EDUCATION/TRAINING PROGRAM

## 2024-07-09 PROCEDURE — 83880 ASSAY OF NATRIURETIC PEPTIDE: CPT | Performed by: STUDENT IN AN ORGANIZED HEALTH CARE EDUCATION/TRAINING PROGRAM

## 2024-07-09 PROCEDURE — 250N000011 HC RX IP 250 OP 636: Performed by: STUDENT IN AN ORGANIZED HEALTH CARE EDUCATION/TRAINING PROGRAM

## 2024-07-09 PROCEDURE — 74177 CT ABD & PELVIS W/CONTRAST: CPT

## 2024-07-09 PROCEDURE — 99285 EMERGENCY DEPT VISIT HI MDM: CPT | Mod: 25

## 2024-07-09 PROCEDURE — 93005 ELECTROCARDIOGRAM TRACING: CPT | Performed by: STUDENT IN AN ORGANIZED HEALTH CARE EDUCATION/TRAINING PROGRAM

## 2024-07-09 PROCEDURE — 250N000013 HC RX MED GY IP 250 OP 250 PS 637: Performed by: STUDENT IN AN ORGANIZED HEALTH CARE EDUCATION/TRAINING PROGRAM

## 2024-07-09 PROCEDURE — 71046 X-RAY EXAM CHEST 2 VIEWS: CPT

## 2024-07-09 PROCEDURE — 36415 COLL VENOUS BLD VENIPUNCTURE: CPT | Performed by: STUDENT IN AN ORGANIZED HEALTH CARE EDUCATION/TRAINING PROGRAM

## 2024-07-09 RX ORDER — NITROGLYCERIN 0.4 MG/1
0.4 TABLET SUBLINGUAL EVERY 5 MIN PRN
Status: DISCONTINUED | OUTPATIENT
Start: 2024-07-09 | End: 2024-07-09 | Stop reason: HOSPADM

## 2024-07-09 RX ORDER — MAGNESIUM HYDROXIDE/ALUMINUM HYDROXICE/SIMETHICONE 120; 1200; 1200 MG/30ML; MG/30ML; MG/30ML
15 SUSPENSION ORAL ONCE
Status: COMPLETED | OUTPATIENT
Start: 2024-07-09 | End: 2024-07-09

## 2024-07-09 RX ORDER — IOPAMIDOL 755 MG/ML
64 INJECTION, SOLUTION INTRAVASCULAR ONCE
Status: COMPLETED | OUTPATIENT
Start: 2024-07-09 | End: 2024-07-09

## 2024-07-09 RX ORDER — LIDOCAINE HYDROCHLORIDE 20 MG/ML
10 SOLUTION OROPHARYNGEAL ONCE
Status: COMPLETED | OUTPATIENT
Start: 2024-07-09 | End: 2024-07-09

## 2024-07-09 RX ORDER — FAMOTIDINE 10 MG
10 TABLET ORAL ONCE
Status: COMPLETED | OUTPATIENT
Start: 2024-07-09 | End: 2024-07-09

## 2024-07-09 RX ORDER — SUCRALFATE ORAL 1 G/10ML
1 SUSPENSION ORAL 4 TIMES DAILY
Qty: 473 ML | Refills: 0 | Status: SHIPPED | OUTPATIENT
Start: 2024-07-09 | End: 2024-07-21

## 2024-07-09 RX ADMIN — LIDOCAINE HYDROCHLORIDE 10 ML: 20 SOLUTION ORAL; TOPICAL at 08:04

## 2024-07-09 RX ADMIN — NITROGLYCERIN 0.4 MG: 0.4 TABLET, ORALLY DISINTEGRATING SUBLINGUAL at 12:28

## 2024-07-09 RX ADMIN — FAMOTIDINE 10 MG: 10 TABLET ORAL at 08:02

## 2024-07-09 RX ADMIN — ALUMINUM HYDROXIDE, MAGNESIUM HYDROXIDE, AND SIMETHICONE 15 ML: 1200; 120; 1200 SUSPENSION ORAL at 08:03

## 2024-07-09 RX ADMIN — IOPAMIDOL 64 ML: 755 INJECTION, SOLUTION INTRAVENOUS at 11:36

## 2024-07-09 ASSESSMENT — ACTIVITIES OF DAILY LIVING (ADL)
ADLS_ACUITY_SCORE: 38
ADLS_ACUITY_SCORE: 38
ADLS_ACUITY_SCORE: 36
ADLS_ACUITY_SCORE: 38

## 2024-07-09 ASSESSMENT — COLUMBIA-SUICIDE SEVERITY RATING SCALE - C-SSRS
6. HAVE YOU EVER DONE ANYTHING, STARTED TO DO ANYTHING, OR PREPARED TO DO ANYTHING TO END YOUR LIFE?: NO
1. IN THE PAST MONTH, HAVE YOU WISHED YOU WERE DEAD OR WISHED YOU COULD GO TO SLEEP AND NOT WAKE UP?: NO
2. HAVE YOU ACTUALLY HAD ANY THOUGHTS OF KILLING YOURSELF IN THE PAST MONTH?: NO

## 2024-07-09 NOTE — ED TRIAGE NOTES
The patient reports sternal chest pain for the past 3 days. She expected it to resolve on its own, but decided to come in to the er when it didn't. She also reports having a headache and sob. She is able to speak in complete sentences. A&Ox4.      Triage Assessment (Adult)       Row Name 07/09/24 0647          Triage Assessment    Airway WDL WDL        Cognitive/Neuro/Behavioral WDL    Cognitive/Neuro/Behavioral WDL WDL

## 2024-07-09 NOTE — DISCHARGE INSTRUCTIONS
Your workup in the emergency department today was reassuring.  No signs of heart attack.  CT of the abdomen do not show any concerning changes either.  Overall your blood work is reassuring.  I think your symptoms could be related to acid reflux and he should follow-up again with your GI doctor that he saw last year as it seems that your symptoms are persistent to worsening.  You have been prescribed a medication called Superfeet to help with the discomfort.

## 2024-07-09 NOTE — ED PROVIDER NOTES
EMERGENCY DEPARTMENT ENCOUNTER      NAME: Kulwant Amin  AGE: 54 year old female  YOB: 1970  MRN: [id number]  EVALUATION DATE & TIME: No admission date for patient encounter.    PCP: Adrien Cardoza    ED PROVIDER: Ryan Otto MD      Chief Complaint   Patient presents with    Chest Pain    Shortness of Breath         FINAL IMPRESSION:  1. Chest pain, unspecified type    2. Gastroesophageal reflux disease with esophagitis without hemorrhage          ED COURSE & MEDICAL DECISION MAKING:    Pertinent Labs & Imaging studies reviewed. (See chart for details)  54 year old female presents to the Emergency Department for evaluation of chest pain/burning in chest    ED Course as of 07/09/24 1500   Tue Jul 09, 2024   0718 Patient is a 54-year-old Tajik speaking female with history of CAD status post drug-eluting stent placement in 2018 to the LAD, type 2 diabetes, hyperlipidemia, COPD, GERD and mild persistent asthma presents to the emergency department for evaluation of feeling of chest burning/pain, shortness of breath as well as headache.  Patient states she is been having essentially constant burning pain in her midsternal region for the past 3 days.  No clear exacerbating or relieving factors.  She had some vomiting yesterday that was nonbloody.  No vomiting today.  Denies any new cough.  No fevers.  Says she has been having a chronic headache for the past several months to years ever since she had a stroke.  No new vision changes or diplopia.  Endorses some mild chronic left-sided weakness but no new unilateral weakness.  Also notes feelings of shortness of breath which is somewhat chronic but is worsened over the past 3 days.  No specific abdominal pain.  Is been taking Tylenol at home for pain relief without success.    On exam here patient is borderline hypertensive.  Initial temperature is slightly hypothermic but this was also an axillary temp and I suspect this may be erroneous.  Saturating well on room  air.  Heart is regular in rate and rhythm.  Lungs are clear without any wheezes or rails but slightly diminished breath sounds at the bases bilaterally.  Abdomen is soft and benign.  No significant lower extremity edema.    Initial differential includes but not limited to ACS, heart failure, GERD, pneumonia.  Will obtain blood work along with EKG and chest x-ray.  Lower suspicion at this point in time for pulmonary embolism given normal heart rate, normal O2 saturation and nonpleuritic nature of pain.   0724 EKG shows a sinus rhythm at 77 beats a minute, left axis, normal [place], QRS and QTc durations, questionable less than 1 mm ST elevation in lead III stable when compared to prior, no ST depressions or T wave inversions noted when compared to previous T wave inversion in lead aVL is resolved.   0806 Labs reviewed and interpreted myself.  CBC shows normal white count and hemoglobin.  BMP is reassuring with normal electrolytes and renal function.  No LFT abnormalities and bilirubin with normal limits.  Lipase negative.  Troponin is negative less than 6 and in conjunction with her EKG low suspicion for ACS at this point in time.  BMP is also normal.       Upon repeat evaluation patient still complaining of a burning sensation in her chest as well as now diffuse abdominal pain.  Seems to have some tenderness in the lower abdomen.  Will proceed with CT abdomen pelvis to evaluate for acute intra-abdominal cause.    CT abdomen pelvis does not show any acute injury or  abdominal cause of her symptoms and is overall reassuring.  Chest x-ray is also unremarkable for any signs of pneumonia or acute cardiopulmonary pathology.    At this point in time with 3 days of symptoms, reassuring EKG and negative troponin lower suspicion for ACS or cardiac etiology of her symptoms.  She does have a history of reflux and states she has been evaluated by GI in the past and prior documentation does note concern for possible eosinophilic  esophagitis.  I think she is safe for discharge home at this point in time but do recommend close follow-up with primary care doctor as well as her GI doctor.  Do not believe she requires further emergent evaluation or admission to the hospital at this point in time.  Plan for discharge discussed with patient and family and they expressed understanding.  Patient discharged stable condition.      6:56 AM I met and evaluated the patient.   11:01 AM I updated patient on results and discussed next steps. She is still describing a burning sensation in her chest and abdomen.  1:46 PM I reevaluated patient and updated patient on results. I discussed plan for discharge at this time.       Medical Decision Making  Obtained supplemental history:Supplemental history obtained?: Documented in chart and Family Member/Significant Other  Reviewed external records: External records reviewed?: Documented in chart and Other: 6/15/2024 [facility] ED  Care impacted by chronic illness:Diabetes, Heart Disease, Hyperlipidemia, and Other: GERD  Care significantly affected by social determinants of health:N/A  Did you consider but not order tests?: Work up considered but not performed and documented in chart, if applicable  Did you interpret images independently?: Independent interpretation of ECG and images noted in documentation, when applicable.  Consultation discussion with other provider:Did you involve another provider (consultant, , pharmacy, etc.)?: No  Discharge. I prescribed additional prescription strength medication(s) as charted. I considered admission, but discharged patient after significant clinical improvement.          At the conclusion of the encounter I discussed the results of all of the tests and the disposition. The questions were answered. The patient or family acknowledged understanding and was agreeable with the care plan.     0 minutes of critical care time     MEDICATIONS GIVEN IN THE  EMERGENCY:  Medications   nitroGLYcerin (NITROSTAT) sublingual tablet 0.4 mg (0.4 mg Sublingual $Given 7/9/24 1228)   alum & mag hydroxide-simethicone (MAALOX) suspension 15 mL (15 mLs Oral $Given 7/9/24 0803)   lidocaine (viscous) (XYLOCAINE) 2 % solution 10 mL (10 mLs Swish & Swallow $Given 7/9/24 0804)   famotidine (PEPCID) tablet 10 mg (10 mg Oral $Given 7/9/24 0802)   iopamidol (ISOVUE-370) solution 64 mL (64 mLs Intravenous $Given 7/9/24 1136)       NEW PRESCRIPTIONS STARTED AT TODAY'S ER VISIT  Discharge Medication List as of 7/9/2024  2:00 PM        START taking these medications    Details   !! sucralfate (CARAFATE) 1 GM/10ML suspension Take 10 mLs (1 g) by mouth 4 times daily for 12 days, Disp-473 mL, R-0, E-Prescribe       !! - Potential duplicate medications found. Please discuss with provider.             =================================================================    HPI    Patient information was obtained from: patient and patient's daughter    Use of : Yes (Phone) - Language Faroese    Per chart review, patient was seen on 6/15/2024 at [facility] ED for evaluation of palpitations. First troponin was less than 6, second troponin was 12, and third troponin was less than 6. No new prescriptions.      Kulwant Amin is a 54 year old female with a pertinent history of latent TB lung, COPD, HTN, chronic non-intractable headache, CAD, asthma, GERD, h/o arterial ischemic stroke, type II diabetes mettitus, and major depression who presents to this ED by independent means for evaluation of chest pain.     Patient reports having burning chest ain for 2-3 days with it worsening this morning. She states that the pain is in and around the heart that has been constant since onset. Patient endorses associated shortness of breath starting 3 days ago and headache. Yesterday, patient endorses emesis, but it has since stopped. No hematemesis.She has taken tylenol and thought her symptoms  would alleviate, but nothing has worked. Patient reports having a h/o chest pain, but notes that this pain is in a new location. Patient denies fever, cough, sore throat, runny nose, or any other cold-like symptoms.     Patient and patient's daughter not patient has a history of epigastric pain due to GERD. Patient has a h/o stroke that happened 3-4 years ago where she has associated chronic headache and left-sided weakness.  No h/o heart failure. Patient uses a cane to ambulate and has a wheel chair if needed.      REVIEW OF SYSTEMS   Refer to the [facility]    PAST MEDICAL HISTORY:  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    Microcytic anemia 11/17/2019    S/P coronary artery stent placement 02/02/2018    TB lung, latent     9 mos INH       PAST SURGICAL HISTORY:  Past Surgical History:   Procedure Laterality Date    CORONARY STENT PLACEMENT  2018    CV CORONARY ANGIOGRAM N/A 1/25/2018    Procedure: Coronary Angiogram;  Surgeon: Angelo Serrano MD;  Location: [facility];  Service:     CV CORONARY ANGIOGRAM N/A 5/5/2022    Procedure: Coronary Angiogram;  Surgeon: Irwin Cano MD;  Location: [facility] CV    CV CORONARY ANGIOGRAM  5/5/2022    Procedure: ;  Surgeon: Irwin Cano MD;  Location: [facility] CV    NH ESOPHAGOGASTRODUODENOSCOPY TRANSORAL DIAGNOSTIC N/A 12/10/2018    Procedure: ESOPHAGOGASTRODUODENOSCOPY (EGD);  Surgeon: Eddie Renteria MD;  Location: [facility] GI;  Service: Gastroenterology    ID ESOPHAGOGASTRODUODENOSCOPY TRANSORAL DIAGNOSTIC N/A 12/3/2020    Procedure: ESOPHAGOGASTRODUODENOSCOPY (EGD) with biospies ;  Surgeon: Avi Crow MD;  Location: [facility];  Service: Gastroenterology    [facility] COLONOSCOPY W/WO BRUSH/WASH N/A 12/10/2018    Procedure: COLONOSCOPY with polypectomy using biopsy forceps;  Surgeon: Eddie Renteria MD;  Location: [facility];  Service: Gastroenterology           CURRENT MEDICATIONS:    sucralfate (CARAFATE) 1 GM/10ML suspension  acetaminophen (TYLENOL) 500 MG tablet  albuterol (PROAIR HFA/PROVENTIL HFA/VENTOLIN HFA) 108 (90 Base) MCG/ACT inhaler  albuterol (PROVENTIL) (2.5 MG/3ML) 0.083% neb solution  amitriptyline (ELAVIL) 50 MG tablet  aspirin (ASPIRIN LOW DOSE) 81 MG EC tablet  atorvastatin (LIPITOR) 80 MG tablet  colchicine (COLCRYS) 0.6 MG tablet  Continuous Blood Gluc Sensor (FREESTYLE MONICA 2 SENSOR) MISC  diclofenac (VOLTAREN) 1 % topical gel  docusate sodium (COLACE) 100 MG capsule  famotidine (PEPCID) 20 MG tablet  ferrous sulfate (FEROSUL) 325 (65 Fe) MG tablet  fluticasone-vilanterol (BREO ELLIPTA) 200-25 MCG/ACT inhaler  gabapentin (NEURONTIN) 600 MG tablet  hydrochlorothiazide (MICROZIDE) 12.5 MG capsule  insulin aspart (NOVOLOG PEN) 100 UNIT/ML pen  insulin glargine (LANTUS PEN) 100 UNIT/ML pen  insulin pen needle (32G X 4 MM) 32G X 4 MM miscellaneous  Lidocaine (LIDOCARE) 4 % Patch  metFORMIN (GLUCOPHAGE XR) 500 MG 24 hr tablet  metoprolol tartrate (LOPRESSOR) 25 MG tablet  montelukast (SINGULAIR) 10 MG tablet  omeprazole (PRILOSEC) 40 MG DR capsule  polyethylene glycol (MIRALAX) 17 GM/Dose powder  predniSONE (DELTASONE) 10 MG tablet  predniSONE (DELTASONE) 20 MG tablet  sucralfate (CARAFATE) 1 GM/10ML suspension  tiotropium (SPIRIVA RESPIMAT) 2.5 MCG/ACT inhaler        ALLERGIES:  No Known Allergies    FAMILY HISTORY:  Family History   Problem Relation Age of Onset    Other - See Comments Mother           of an intestinal problem    Ulcers Father          of gastritis    Breast Cancer No family hx of     Ovarian Cancer No family hx of     Colon Cancer No family hx of        SOCIAL HISTORY:   Social History     Socioeconomic History    Marital status:    Tobacco Use    Smoking status: Former     Current packs/day: 0.00     Average packs/day: 1 pack/day for 30.0 years (30.0 ttl pk-yrs)     Types: Cigarettes     Start date: 1973     Quit date: 2003     Years since quittin.5     Passive exposure: Never    Smokeless tobacco: Never    Tobacco comments:     No passive exposure   Vaping Use    Vaping status: Never Used   Substance and Sexual Activity    Alcohol use: No    Drug use: No    Sexual activity: Yes     Partners: Male   Social History Narrative    2017 The patient lives with her daughter-in-law (who is present), , son, and 2 grandchildren (total of 6 people). Immigrant.     Social Determinants of Health     Financial Resource Strain: Low Risk  (2/15/2024)    Financial Resource Strain     Within the past 12 months, have you or your family members you live with been unable to get utilities (heat, electricity) when it was really needed?: No   Food Insecurity: Low Risk  (2/15/2024)    Food Insecurity     Within the past 12 months, did you worry that your food would run out before you got money to buy more?: No     Within the past 12 months, did the food you bought just not last and you didn t have money to get more?: No   Transportation Needs: Low Risk  (2/15/2024)    Transportation Needs     Within the past 12 months, has lack of transportation kept you from medical appointments, getting your medicines, non-medical meetings or appointments, work, or from getting things that you need?: No   Interpersonal Safety: Unknown (2024)    Interpersonal Safety     Do you feel physically and emotionally safe where you currently live?: Patient unable to answer     Within the past 12  months, have you been hit, slapped, kicked or otherwise physically hurt by someone?: Patient unable to answer     Within the past 12 months, have you been humiliated or emotionally abused in other ways by your partner or ex-partner?: Patient unable to answer   Housing Stability: Low Risk  (2/15/2024)    Housing Stability     Do you have housing? : Yes     Are you worried about losing your housing?: No       VITALS:  /58   Pulse 73   Temp 97.7  F (36.5  C) (Oral)   Resp 19   SpO2 95%     PHYSICAL EXAM    Constitutional: Well developed, Well nourished, NAD, appears uncomfortable.  HENT: Normocephalic, Atraumatic,  mucous membranes moist,   Neck- trachea midline, No stridor.    Eyes:EOMI, Conjunctiva normal, No discharge.   Respiratory: Normal breath sounds, No respiratory distress, No wheezing. Slightly diminished breath sounds at the bases with no expiratory wheezes.    Cardiovascular: Normal heart rate, Regular rhythm, No murmurs,   Abdominal: Soft, No tenderness, No rebound or guarding.     Musculoskeletal: no deformity or malalignment. No significant lower extremity edema.   Integument: Warm, Dry, No erythema,    Neurologic: Alert & oriented x 3, no facial droop, seemingly symmetric strength in bilateral upper and lower extremities on exam  Psychiatric: Affect normal, Cooperative.      LAB:  All pertinent labs reviewed and interpreted.  Results for orders placed or performed during the hospital encounter of 07/09/24   XR Chest 2 Views    Impression    IMPRESSION:     Cardiac silhouette is normal in size for AP technique. There are left coronary stents. Mild aortic atheromatous calcifications but normal vascular pedicle.    Symmetric lung inflation. No airspace opacities or interlobular septal thickening. Diaphragm curvature is normal. No pleural effusion.   CT Abdomen Pelvis w Contrast    Impression    IMPRESSION:     No mechanical bowel obstruction or focal intra-abdominal or pelvic inflammatory  process.   Basic metabolic panel   Result Value Ref Range    Sodium 140 135 - 145 mmol/L    Potassium 3.8 3.4 - 5.3 mmol/L    Chloride 103 98 - 107 mmol/L    Carbon Dioxide (CO2) 24 22 - 29 mmol/L    Anion Gap 13 7 - 15 mmol/L    Urea Nitrogen 13.6 6.0 - 20.0 mg/dL    Creatinine 0.50 (L) 0.51 - 0.95 mg/dL    GFR Estimate >90 >60 mL/min/1.73m2    Calcium 9.2 8.6 - 10.0 mg/dL    Glucose 123 (H) 70 - 99 mg/dL   Result Value Ref Range    Troponin T, High Sensitivity <6 <=14 ng/L   Result Value Ref Range    Lipase 47 13 - 60 U/L   Hepatic function panel   Result Value Ref Range    Protein Total 6.7 6.4 - 8.3 g/dL    Albumin 3.8 3.5 - 5.2 g/dL    Bilirubin Total 0.4 <=1.2 mg/dL    Alkaline Phosphatase 93 40 - 150 U/L    AST 31 0 - 45 U/L    ALT 31 0 - 50 U/L    Bilirubin Direct <0.20 0.00 - 0.30 mg/dL   CBC with platelets and differential   Result Value Ref Range    WBC Count 6.1 4.0 - 11.0 10e3/uL    RBC Count 4.58 3.80 - 5.20 10e6/uL    Hemoglobin 12.3 11.7 - 15.7 g/dL    Hematocrit 38.2 35.0 - 47.0 %    MCV 83 78 - 100 fL    MCH 26.9 26.5 - 33.0 pg    MCHC 32.2 31.5 - 36.5 g/dL    RDW 15.1 (H) 10.0 - 15.0 %    Platelet Count 178 150 - 450 10e3/uL    % Neutrophils 59 %    % Lymphocytes 28 %    % Monocytes 8 %    % Eosinophils 5 %    % Basophils 1 %    % Immature Granulocytes 0 %    NRBCs per 100 WBC 0 <1 /100    Absolute Neutrophils 3.6 1.6 - 8.3 10e3/uL    Absolute Lymphocytes 1.7 0.8 - 5.3 10e3/uL    Absolute Monocytes 0.5 0.0 - 1.3 10e3/uL    Absolute Eosinophils 0.3 0.0 - 0.7 10e3/uL    Absolute Basophils 0.0 0.0 - 0.2 10e3/uL    Absolute Immature Granulocytes 0.0 <=0.4 10e3/uL    Absolute NRBCs 0.0 10e3/uL   Nt probnp inpatient   Result Value Ref Range    N terminal Pro BNP Inpatient 125 0 - 900 pg/mL       RADIOLOGY:  Reviewed all pertinent imaging. Please see official radiology report.  XR Chest 2 Views   Final Result   IMPRESSION:       Cardiac silhouette is normal in size for AP technique. There are left  coronary stents. Mild aortic atheromatous calcifications but normal vascular pedicle.      Symmetric lung inflation. No airspace opacities or interlobular septal thickening. Diaphragm curvature is normal. No pleural effusion.      CT Abdomen Pelvis w Contrast   Final Result   IMPRESSION:       No mechanical bowel obstruction or focal intra-abdominal or pelvic inflammatory process.          EKG:        Impression: EKG shows a sinus rhythm at 77 beats a minute, left axis, normal [place], QRS and QTc durations, questionable less than 1 mm ST elevation in lead III stable when compared to prior, no ST depressions or T wave inversions noted when compared to previous T wave inversion in lead aVL is resolved.        I have independently reviewed and interpreted the EKG(s) documented above.    PROCEDURES:         [employer] System Documentation:   CMS Diagnoses:              I, Sofia Powell, am serving as a scribe to document services personally performed by Ryan Otto MD based on my observation and the provider's statements to me. I, Ryan Otto MD, attest that Sofia Powell is acting in a scribe capacity, has observed my performance of the services and has documented them in accordance with my direction.    Ryan Otto MD  [facility] EMERGENCY DEPARTMENT  [address] [place] [id number]  [phone]        Ryan Otto MD  07/09/24 9791

## 2024-07-10 ENCOUNTER — OFFICE VISIT (OUTPATIENT)
Dept: PULMONOLOGY | Facility: CLINIC | Age: 54
End: 2024-07-10
Attending: INTERNAL MEDICINE
Payer: COMMERCIAL

## 2024-07-10 VITALS
SYSTOLIC BLOOD PRESSURE: 128 MMHG | OXYGEN SATURATION: 91 % | WEIGHT: 125.6 LBS | BODY MASS INDEX: 24.53 KG/M2 | DIASTOLIC BLOOD PRESSURE: 64 MMHG | HEART RATE: 95 BPM

## 2024-07-10 DIAGNOSIS — K21.9 GASTROESOPHAGEAL REFLUX DISEASE, UNSPECIFIED WHETHER ESOPHAGITIS PRESENT: Primary | ICD-10-CM

## 2024-07-10 PROCEDURE — 99214 OFFICE O/P EST MOD 30 MIN: CPT | Performed by: INTERNAL MEDICINE

## 2024-07-10 PROCEDURE — G2211 COMPLEX E/M VISIT ADD ON: HCPCS | Performed by: INTERNAL MEDICINE

## 2024-07-10 NOTE — TELEPHONE ENCOUNTER
Luna Ko, RN   to [facility] - Primary Care   AT    7/3/24  2:27 PM  Please contact patient to clarify if patient is still taking this medication?  Not active on med list.    Thank you!  ZACK Hernandez, RN-[facility]

## 2024-07-10 NOTE — PROGRESS NOTES
PULMONARY OUTPATIENT FOLLOW UP NOTE        Assessment:   54 year old female with history of severe persistent asthma, GERD, possible eosinophilic esophagitis who comes in for one week of chest pain, she was seen in the ED and cardiac work up was negative. Of note patient had repeated cardiac work up in the past including stress test which was reassuring, currently she reports chest pain and worsening heartburn symptoms for one week that could likely be GI related due to worsening esophagitis vs PUD from repeated use of steroids. Patient is not finding relief with PPI, sucralfate, and prn tums. Today discussed the need for GI follow up for further work up and management of her symptoms. Will trial adding GI cocktail to further help with symptoms and continue omeprazole and sucralfate.     Severe persistent asthma   Currently stable, no increase in her SOB and compliant with her inhalers. Will continue current medications with no changes.   - continue Breo Ellipta one puff daily  - continue Spiriva 2 puffs daily   - continue albuterol nebs 3 times daily      GERD   Possible eosinophilic esophagitis   Worsening heartburn and burning sensation with chest pain. Most likely her symptoms are from worsening esophagitis. Was seen by [facility] in 2022 and had EGD    - contin  COPD / AsthmaContinue LABA/ICS, LAMA. Albuterol HFA as needed.    Continue O2 supplementation 2 LPM with activities and at night.   Abnormal chest CT scan   Lung infiltrates  Lung nodules     FLACO on CPAPContinue CPAP plus oxygen 2 LPM at night   Physical deconditioning Increase activity level, modify diet habits.   Obesity Body mass index is 24.53 kg/m .        D  D  D     Plan:      D  D  D        ERWIN Hightower  [facility] PGY3      ***          CC:   Chest pain  Heartburn      HPI:    Kulwant Amin is a 54 year old female who presents for follow up appointment. Patient has history of severe persistent asthma, GERD, possible  eosinophilic esophagitis,  anxiety, and hypertension who presents for follow up. Patient reports chest pain for the past one week and worsening heartburn. Patient was seen in the ED yesterday for the chest pain and had a negative cardiac work up. She states that chest pain started at rest, is constant, not worsened with exertion and no relieving factors. She describes pain as dull in the anterior mid chest but also notes burning sensation with it. She is currently on omeprazole 40 mg , takes tums PRN and sucralfate with no relief. She had GI cocktail in the ED yesterday but states she had no relief. Reports no changes in her breathing, no cough or fever. Daughter in law helps her with her medications and reports compliance with her inhalers.  No recent changes in her medications.      Past Medical History :     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    Microcytic anemia 11/17/2019    S/P coronary artery stent placement 02/02/2018    TB lung, latent     9 mos INH          Medications:     Current Outpatient Medications   Medication Sig Dispense Refill    acetaminophen (TYLENOL) 500 MG tablet TAKE 1 PILL BY MOUTH EVERY 6 HOURS AS NEEDED FOR PAIN 100 tablet 10    albuterol (PROAIR HFA/PROVENTIL HFA/VENTOLIN HFA) 108 (90 Base) MCG/ACT inhaler Inhale 2 puffs into the lungs every 4 hours as needed for shortness of breath 18 g 11    albuterol (PROVENTIL) (2.5 MG/3ML) 0.083% neb solution Take 1 vial (2.5 mg) by  nebulization every 6 hours as needed for shortness of breath, wheezing or cough 360 mL 11    amitriptyline (ELAVIL) 50 MG tablet Take 1 tablet (50 mg) by mouth at bedtime 90 tablet 1    aspirin (ASPIRIN LOW DOSE) 81 MG EC tablet TAKE 1 TABLET (81 MG TOTAL) BY MOUTH DAILY. 90 tablet 1    atorvastatin (LIPITOR) 80 MG tablet Take 1 tablet (80 mg) by mouth daily 90 tablet 3    colchicine (COLCRYS) 0.6 MG tablet Take 1 tablet (0.6 mg) by mouth daily 90 tablet 3    Continuous Blood Gluc Sensor (FREESTYLE MONICA 2 SENSOR) [facility] [place] 1 EACH EVERY 14 DAYS USE 1 SENSOR EVERY 14 DAYS. USE TO READ BLOOD SUGARS PER 'S INSTRUCTIONS. 2 each 11    diclofenac (VOLTAREN) 1 % topical gel Apply 4 g topically 4 times daily 350 g 3    docusate sodium (COLACE) 100 MG capsule Take 2 capsules (200 mg) by mouth 2 times daily as needed for constipation 90 capsule 3    famotidine (PEPCID) 20 MG tablet Take 1 tablet (20 mg) by mouth 2 times daily 120 tablet 0    ferrous sulfate (FEROSUL) 325 (65 Fe) MG tablet Take 1 tablet (325 mg) by mouth daily (with breakfast) 90 tablet 1    fluticasone-vilanterol (BREO ELLIPTA) 200-25 MCG/ACT inhaler Inhale 1 puff into the lungs daily 60 each 11    gabapentin (NEURONTIN) 600 MG tablet TAKE 1 PILL (600 MG) BY MOUTH 3 TIMES DAILY 90 tablet 3    hydrochlorothiazide (MICROZIDE) 12.5 MG capsule Take 1 capsule (12.5 mg) by mouth every morning 90 capsule 3    insulin aspart (NOVOLOG PEN) 100 UNIT/ML pen Inject 20 Units Subcutaneous 2 times daily (before meals) 45 mL 3    insulin glargine (LANTUS PEN) 100 UNIT/ML pen Inject 44 Units Subcutaneous every morning 45 mL 3    insulin pen needle (32G X 4 MM) 32G X 4 MM miscellaneous Use 3 pen needles daily or as directed. 300 each 4    Lidocaine (LIDOCARE) 4 % Patch Place 1 patch onto the skin every 24 hours To prevent lidocaine toxicity, patient should be patch free for 12 hrs daily. 30 patch 1    metFORMIN (GLUCOPHAGE XR) 500 MG 24 hr tablet Take 2 tablets (1,000  mg) by mouth 2 times daily (with meals) 360 tablet 3    metoprolol tartrate (LOPRESSOR) 25 MG tablet TAKE 1 TABLET (25 MG TOTAL) BY MOUTH 2 (TWO) TIMES A DAY FOR BLOOD PRESSURE 180 tablet 3    montelukast (SINGULAIR) 10 MG tablet Take 1 tablet (10 mg) by mouth at bedtime 30 tablet 11    omeprazole (PRILOSEC) 40 MG DR capsule TAKE 1 CAPSULE (40 MG) BY MOUTH DAILY 90 capsule 2    polyethylene glycol (MIRALAX) 17 GM/Dose powder Take 17 g (1 Capful) by mouth daily 238 g 1    predniSONE (DELTASONE) 10 MG tablet Take 4 tabs daily x 3 days, THEN 3 tabs daily x 3 days, THEN 2 tabs daily x 3 days, THEN 1 tab daily x 3 days 30 tablet 0    predniSONE (DELTASONE) 20 MG tablet Take 1 tablet (20 mg) by mouth 3 times daily 15 tablet 0    sucralfate (CARAFATE) 1 GM/10ML suspension Take 10 mLs (1 g) by mouth 4 times daily for 12 days 473 mL 0    sucralfate (CARAFATE) 1 GM/10ML suspension Take 10 mLs (1 g) by mouth 4 times daily 3600 mL 1    tiotropium (SPIRIVA RESPIMAT) 2.5 MCG/ACT inhaler INHALE 2 PUFFS INTO THE LUNGS DAILY 4 g 3        Social History :     Social History     Socioeconomic History    Marital status:      Spouse name: Not on file    Number of children: Not on file    Years of education: Not on file    Highest education level: Not on file   Occupational History    Not on file   Tobacco Use    Smoking status: Former     Current packs/day: 0.00     Average packs/day: 1 pack/day for 30.0 years (30.0 ttl pk-yrs)     Types: Cigarettes     Start date: 1973     Quit date: 2003     Years since quittin.5     Passive exposure: Never    Smokeless tobacco: Never    Tobacco comments:     No passive exposure   Vaping Use    Vaping status: Never Used   Substance and Sexual Activity    Alcohol use: No    Drug use: No    Sexual activity: Yes     Partners: Male   Other Topics Concern    Parent/sibling w/ CABG, MI or angioplasty before 65F 55M? Not Asked   Social History Narrative    2017 The patient lives  with her daughter-in-law (who is present), , son, and 2 grandchildren (total of 6 people). Immigrant.     Social Determinants of Health     Financial Resource Strain: Low Risk  (2/15/2024)    Financial Resource Strain     Within the past 12 months, have you or your family members you live with been unable to get utilities (heat, electricity) when it was really needed?: No   Food Insecurity: Low Risk  (2/15/2024)    Food Insecurity     Within the past 12 months, did you worry that your food would run out before you got money to buy more?: No     Within the past 12 months, did the food you bought just not last and you didn t have money to get more?: No   Transportation Needs: Low Risk  (2/15/2024)    Transportation Needs     Within the past 12 months, has lack of transportation kept you from medical appointments, getting your medicines, non-medical meetings or appointments, work, or from getting things that you need?: No   Physical Activity: Not on file   Stress: Not on file   Social Connections: Not on file   Interpersonal Safety: Unknown (2024)    Interpersonal Safety     Do you feel physically and emotionally safe where you currently live?: Patient unable to answer     Within the past 12 months, have you been hit, slapped, kicked or otherwise physically hurt by someone?: Patient unable to answer     Within the past 12 months, have you been humiliated or emotionally abused in other ways by your partner or ex-partner?: Patient unable to answer   Housing Stability: Low Risk  (2/15/2024)    Housing Stability     Do you have housing? : Yes     Are you worried about losing your housing?: No          Family History :     Family History   Problem Relation Age of Onset    Other - See Comments Mother          of an intestinal problem    Ulcers Father          of gastritis    Breast Cancer No family hx of     Ovarian Cancer No family hx of     Colon Cancer No family hx of        Review of Systems  A 12  point comprehensive review of systems was negative except as noted.        Objective:     /64 (BP Location: Left arm, Patient Position: Sitting, Cuff Size: Adult Regular)   Pulse 95   Wt 57 kg (125 lb 9.6 oz)   SpO2 91%   BMI 24.53 kg/m      Gen: awake, alert, no distress  HEENT: pink conjunctiva, moist mucosa, Mallampati II/IV  Neck: no thyromegaly, masses or JVD  Lungs: clear, no wheezing  CV: regular, no murmurs or gallops appreciated  Abdomen: soft, NT, BS wnl  Ext: no edema  Neuro: alert and oriented, non focal      Diagnostic tests:         PFTs:          IMAGES:

## 2024-07-10 NOTE — PATIENT INSTRUCTIONS
-Call this number to set up an appointment with the Asthma specialist at health "[employer]" [phone].   - follow up with GI doctor  - follow up here within 6-8 weeks

## 2024-07-10 NOTE — PROGRESS NOTES
Pulmonary Outpatient Clinic Follow Up      Assessment/Plan:    54 year old woman with history of organic fuel exposure in the home was previously had nondiagnostic PFTs.  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    Severe persistent asthma with exacerbation: Continues to have severe symptoms and has had repeated cardiac workup for the symptoms as well.  She responds intermittently to steroids but continues to require short acting bronchodilators 3-4 times a day and has not responded well to monoclonal antibodies.  I discussed with her daughter-in-law that we do not have to do any more therapies to offer and it may be beneficial for her to be seen by an asthma specialist.  Placed a consult for Dr. Elsie Francois at [facility] for second opinion--we have given that she has not send with her daughter-in-law on telephone numbers that did not receive a call that the last time that the consult was placed.  Continue Breo Ellipta 1 puff daily.  She knows to gargle after using this.   Continue Spiriva.  Continue to use 3 times daily albuterol nebs.  Continue Singulair daily.  As needed albuterol rescue.  Has a 9 years old hospital bed that is now broken and requires a new one. This is not covered by medicare and I have told the daughter-in-law to check [employer] or [employer].  She requires the HOB elevated to more than 30 degrees due to her obstructive lung disease which is triggered when she is lower than this.  Requires frequent need for repositioning.  Height was documented as 63 inches.  Questionable eosinophilic esophagitis: Has significant epigastric abdominal pain likely secondary to gastritis from chronic steroid use vs eosinophilic esophagitis.  Follows with GI for this; have asked them to see [facility] soon.  Omeprazole 40 mg a day.  As needed  Tums.  Have written for magic mouthwash.  Follow-up: 6 weeks with me.    Tamra Duong MD  Pulmonary and Critical Care  (p) [phone]      Subjective:   Patient ID: Ms. Amin is a 54 year old female with a past medical history significant for anxiety, arthritis, questionable asthma versus COPD, diabetes, hypertension and a prior history of SVT presents with her daughter in law for a follow-up visit.  Since her last clinic visit with me she was seen by Dr. Marie and it is noted that she has previously been on Fasenra which alleviated her symptoms of asthma but caused her to have significant itching.  She was trialed on Dupixent with minimal efficacy and has not been in test by which has not been effective either.  She continues to use Breo and Spiriva and uses her DuoNeb nebulizers 3 times a day.   Seen in her apartment yesterday with generalized epigastric abdominal pain and was ruled out for ACS to have supervision after the procedure.  She had normal chest imaging on 8 June and working diagnosis/problem severe gastritis/esophagitis contrived she was discharged.  This continues to be a problem complaint.  She otherwise feels well.          3/8/2024     9:10 AM 3/28/2024    10:35 AM 4/25/2024    11:00 AM   ACT Total Scores   ACT TOTAL SCORE (Goal Greater than or Equal to 20) 8 6 6   In the past 12 months, how many times did you visit the emergency room for your asthma without being admitted to the hospital? 0 1 1   In the past 12 months, how many times were you hospitalized overnight because of your asthma? 0 1 1       Current Outpatient Medications   Medication Sig Dispense Refill    acetaminophen (TYLENOL) 500 MG tablet TAKE 1 PILL BY MOUTH EVERY 6 HOURS AS NEEDED FOR PAIN 100 tablet 10    albuterol (PROAIR HFA/PROVENTIL HFA/VENTOLIN HFA) 108 (90 Base) MCG/ACT inhaler Inhale 2 puffs into the lungs every 4 hours as needed for shortness of breath 18 g 11    albuterol (PROVENTIL) (2.5 MG/3ML) 0.083% neb solution  Take 1 vial (2.5 mg) by nebulization every 6 hours as needed for shortness of breath, wheezing or cough 360 mL 11    amitriptyline (ELAVIL) 50 MG tablet Take 1 tablet (50 mg) by mouth at bedtime 90 tablet 1    aspirin (ASPIRIN LOW DOSE) 81 MG EC tablet TAKE 1 TABLET (81 MG TOTAL) BY MOUTH DAILY. 90 tablet 1    atorvastatin (LIPITOR) 80 MG tablet Take 1 tablet (80 mg) by mouth daily 90 tablet 3    colchicine (COLCRYS) 0.6 MG tablet Take 1 tablet (0.6 mg) by mouth daily 90 tablet 3    Continuous Blood Gluc Sensor (FREESTYLE MONICA 2 SENSOR) MISC 1 EACH EVERY 14 DAYS USE 1 SENSOR EVERY 14 DAYS. USE TO READ BLOOD SUGARS PER 'S INSTRUCTIONS. 2 each 11    diclofenac (VOLTAREN) 1 % topical gel Apply 4 g topically 4 times daily 350 g 3    docusate sodium (COLACE) 100 MG capsule Take 2 capsules (200 mg) by mouth 2 times daily as needed for constipation 90 capsule 3    famotidine (PEPCID) 20 MG tablet Take 1 tablet (20 mg) by mouth 2 times daily 120 tablet 0    ferrous sulfate (FEROSUL) 325 (65 Fe) MG tablet Take 1 tablet (325 mg) by mouth daily (with breakfast) 90 tablet 1    fluticasone-vilanterol (BREO ELLIPTA) 200-25 MCG/ACT inhaler Inhale 1 puff into the lungs daily 60 each 11    gabapentin (NEURONTIN) 600 MG tablet TAKE 1 PILL (600 MG) BY MOUTH 3 TIMES DAILY 90 tablet 3    hydrochlorothiazide (MICROZIDE) 12.5 MG capsule Take 1 capsule (12.5 mg) by mouth every morning 90 capsule 3    insulin aspart (NOVOLOG PEN) 100 UNIT/ML pen Inject 20 Units Subcutaneous 2 times daily (before meals) 45 mL 3    insulin glargine (LANTUS PEN) 100 UNIT/ML pen Inject 44 Units Subcutaneous every morning 45 mL 3    insulin pen needle (32G X 4 MM) 32G X 4 MM miscellaneous Use 3 pen needles daily or as directed. 300 each 4    Lidocaine (LIDOCARE) 4 % Patch Place 1 patch onto the skin every 24 hours To prevent lidocaine toxicity, patient should be patch free for 12 hrs daily. 30 patch 1    magic mouthwash suspension (diphenhydrAMINE,  lidocaine, aluminum-magnesium & simethicone) Swish and swallow 10 mLs in mouth every 6 hours as needed for mouth sores 120 mL 0    metFORMIN (GLUCOPHAGE XR) 500 MG 24 hr tablet Take 2 tablets (1,000 mg) by mouth 2 times daily (with meals) 360 tablet 3    metoprolol tartrate (LOPRESSOR) 25 MG tablet TAKE 1 TABLET (25 MG TOTAL) BY MOUTH 2 (TWO) TIMES A DAY FOR BLOOD PRESSURE 180 tablet 3    montelukast (SINGULAIR) 10 MG tablet Take 1 tablet (10 mg) by mouth at bedtime 30 tablet 11    omeprazole (PRILOSEC) 40 MG DR capsule TAKE 1 CAPSULE (40 MG) BY MOUTH DAILY 90 capsule 2    polyethylene glycol (MIRALAX) 17 GM/Dose powder Take 17 g (1 Capful) by mouth daily 238 g 1    predniSONE (DELTASONE) 10 MG tablet Take 4 tabs daily x 3 days, THEN 3 tabs daily x 3 days, THEN 2 tabs daily x 3 days, THEN 1 tab daily x 3 days 30 tablet 0    predniSONE (DELTASONE) 20 MG tablet Take 1 tablet (20 mg) by mouth 3 times daily 15 tablet 0    sucralfate (CARAFATE) 1 GM/10ML suspension Take 10 mLs (1 g) by mouth 4 times daily for 12 days 473 mL 0    sucralfate (CARAFATE) 1 GM/10ML suspension Take 10 mLs (1 g) by mouth 4 times daily 3600 mL 1    tiotropium (SPIRIVA RESPIMAT) 2.5 MCG/ACT inhaler INHALE 2 PUFFS INTO THE LUNGS DAILY 4 g 3     Social history:  Lives in a house built in 1963; no concern for mold in the house.  7 People live in the house including 2 children.  There are no pets in the house.  She is a never smoker and there is no second hand exposure to smoke.  She does not drink alcoholic beverages and denies indulging in recreational drugs.    Physical Exam   /64 (BP Location: Left arm, Patient Position: Sitting, Cuff Size: Adult Regular)   Pulse 95   Wt 57 kg (125 lb 9.6 oz)   SpO2 91%   BMI 24.53 kg/m      Physical Exam  Constitutional:       General: She is not in acute distress.     Appearance: She is not ill-appearing or diaphoretic.   Cardiovascular:      Rate and Rhythm: Normal rate and regular rhythm.       Pulses: Normal pulses.      Heart sounds: Normal heart sounds.   Pulmonary:      Effort: Pulmonary effort is normal. No respiratory distress.      Breath sounds: Wheezing present. No rhonchi.   Musculoskeletal:      Right lower leg: No edema.      Left lower leg: No edema.   Skin:     General: Skin is warm and dry.      Findings: No rash.   Neurological:      Mental Status: She is alert.   Psychiatric:         Behavior: Behavior normal.            Latest Reference Range & Units 02/07/22 15:47   Neutrophil Cytoplasmic Antibody <1:10  <1:10   Neutrophil Cytoplasmic Antibody Pattern  The ANCA IFA is <1:10.  No further testing will be performed.      Latest Reference Range & Units 02/07/22 15:47   Schistosoma Ab IgG Negative  Negative [1]   Strongyloides Bere IgG <=0.9 IV 0.4 [2]      Latest Reference Range & Units 02/07/22 15:47   Immunoglobulin E <=214 kU/L 74 [1]   Allergen Fungimold A Fumigatus IgE <=0.34 kU/L <0.10 [2]   Aspergillus Fumagatis 1 Antibody None Detected  None Detected [3]   Aspergillus Fumagatis 6 Antibody None Detected  None Detected [4]   Allergen, Interp, Immunocap Score IgE  See Note [5]     XR CHEST 2 VIEWS, DATE/TIME: 3/27/2023:  INDICATION: chest pain  COMPARISON: 02/05/2023                                                              IMPRESSION: No pneumothorax or pleural effusion. No focal consolidation. Cardiac silhouette within normal limits. Mildly atherosclerotic aorta.    CT CHEST W/O CONTRAST, DATE/TIME: 7/11/2022   1.  No acute abnormality. No cause of chest pain is evident.  2.  A few small lung nodules without significant change.  Recommendations for one or multiple incidental lung nodules < 6mm :   Low risk patients: No routine follow-up.   High risk patients: Optional follow-up CT at 12 months; if unchanged, no further follow-up.    ECHO 5/4/2022  Interpretation Summary  Left ventricular size, wall motion and function are normal. The ejection  fraction is 55-60%.  There is  borderline anterior, septal, and apical wall hypokinesis.  Normal right ventricle size and systolic function.  No hemodynamically significant valvular abnormalities on 2D or color flow imaging.

## 2024-07-10 NOTE — TELEPHONE ENCOUNTER
"Writer attempt #1 to call patient with the help of a \"Serbian\"  regarding Refill RN's message below. No answer, left non-detailed voicemail, with clinic call back number.     If patient / family calls back, please gather more information regarding refill request for Fluoxetine. Please route message back to prescribing provider as needed. Thanks.    PAULA AguirreN, RN   [facility]    "

## 2024-07-11 ENCOUNTER — TRANSFERRED RECORDS (OUTPATIENT)
Dept: HEALTH INFORMATION MANAGEMENT | Facility: CLINIC | Age: 54
End: 2024-07-11

## 2024-07-11 ENCOUNTER — PATIENT OUTREACH (OUTPATIENT)
Dept: CARE COORDINATION | Facility: CLINIC | Age: 54
End: 2024-07-11
Payer: COMMERCIAL

## 2024-07-11 NOTE — PROGRESS NOTES
Clinic Care Coordination Contact  Community Health Worker Initial Outreach    CHW Initial Information Gathering:  Referral Source: ED Follow-Up  Current living arrangement:: Not Assessed  CHW Additional Questions  If ED/Hospital discharge, follow-up appointment scheduled as recommended?: N/A (Pt was previously scheduled for an PCP Office Visit 07/25)  Medication changes made following ED/Hospital discharge?: No  MyChart active?: Yes  Patient sent Social Determinants of Health questionnaire?: No    Patient accepts CC: No, Patient's Daughter expressed that no additional support is needed at this time. Patient's Daughter is not interested in receiving additional information via [email or web address].       Mairel Cast  [facility] Worker    Connected Care Resources   [facility]     *Connected Care Resources Team does NOT   follow patient ongoing. Referrals are identified   based on internal discharge report and the outreach is to ensure   Patient has understanding of their discharge instructions.

## 2024-07-12 NOTE — LETTER
[facility]                                     Progress Note    Patient Name: Tressa Jeong  Date: 7/12/2024      Service Type: Individual      Session Start Time: 10:01 AM  Session End Time: 10:55 AM     Session Length: 53+ min    Session #: 23    Attendees: Client attended alone    Service Modality:  Video Visit:      Provider verified identity through the following two step process.  Patient provided:  Patient is known previously to provider    Telemedicine Visit: The patient's condition can be safely assessed and treated via synchronous audio and visual telemedicine encounter.      Reason for Telemedicine Visit: Patient has requested telehealth visit    Originating Site (Patient Location): Patient's home    Distant Site (Provider Location): Provider Remote Setting- Home Office    Consent:  The patient/guardian has verbally consented to: the potential risks and benefits of telemedicine (video visit) versus in person care; bill my insurance or make self-payment for services provided; and responsibility for payment of non-covered services.     Patient would like the video invitation sent by:  My Chart    Mode of Communication:  Video Conference via [facility]    Distant Location (Provider):  Off-site    As the provider I attest to compliance with applicable laws and regulations related to telemedicine.    DATA  Extended Session (53+ minutes): PROLONGED SERVICE IN THE OUTPATIENT SETTING REQUIRING DIRECT (FACE-TO-FACE) PATIENT CONTACT BEYOND THE USUAL SERVICE:    - Patient's presenting concerns require more intensive intervention than could be completed within the usual service  Interactive Complexity: No  Crisis: No        Progress Since Last Session (Related to Symptoms / Goals / Homework):   Symptoms: No change based on patient self-report.    Homework: Partially completed      Episode of Care Goals: Minimal progress - PREPARATION (Decided to change - considering how); Intervened by negotiating      8/10/2023         RE: Kulwant Amin  [address] [address]        Dear Colleague,    Thank you for referring your patient, Kulwant Amin, to the [facility] [place]. Please see a copy of my visit note below.    NEUROLOGY OUTPATIENT PROGRESS NOTE  Aug 10, 2023     CHIEF COMPLAINT/REASON FOR VISIT/REASON FOR CONSULT  Patient presents with:  Follow Up    REASON FOR CONSULTATION- Multiple issues    REFERRAL SOURCE   Referred Self  CC  Referred Self    HISTORY OF PRESENT ILLNESS  Kulwant Amin is a 53 year old female seen today for evaluation of multiple issues.    In 2021 she was found to have a right pontine stroke.  Presenting symptoms of vertigo.  No clear etiology for the stroke was identified it was thought to be lacunar.  She does have a history of hypertension, hyperlipidemia, diabetes.  She was supposed to be on Plavix though currently is only on aspirin.  No recurrence of stroke symptoms.  She is presented to the ER since then with vertigo symptoms and her imaging studies have been negative    1.  She reports that the vertigo is there all the time.  She is using meclizine which she finds some benefit with. She has done vestibular rehab without any improvement.    2.  Further complains of headaches.  These are around-the-clock.  He is taking Tylenol every 8 hours to get rid of the headaches.  Headaches are more frontal.  They can be throbbing in nature with photophobia and phonophobia.  She does continue visual auras as well.  She is on amitriptyline at nighttime.  She feels that it might be helping with the headaches.  Does not get good sleep due to COPD.  Headaches started after her stroke.    3.  No other new strokelike symptoms.    4.  Does complain of some lightheadedness especially during the exam today.  Has been put on hydrochlorothiazide by her primary care provider.    5/24/23  Patient returns today  1.  No stroke symptoms.  Is tolerating her stroke  a change plan and determining options / strategies for behavior change, identifying triggers, exploring social supports, and working towards setting a date to begin behavior change     Current / Ongoing Stressors and Concerns:   Patient reports she is concerned about ADHD symptoms. Patient reports she is concerned about motivation. Patient reports she is concerned about working. Patient reports she is concerned about task completion.  Patient reports she is concerned about prioritizing tasks. Patient reports her dad is home for the winter. Patient reports concerns about re-entering the workforce. Patient reports she is concerned about her sleep habits. Patient reports she is concerned about productivity. Patient reports concerns about organization. Patient reports she is job searching. Patient reports concerns about an internal conflict. Patient reports her dad is going back to [place].      Treatment Objective(s) Addressed in This Session:   Patient will learn about the diagnosis and symptoms of ADHD.   Patient will learn about how ADHD can impact social interactions and interpersonal relationships.  Client will identify triggers of anxiety and process them in session.    Client will identify initial signs or symptoms of anxiety.   Patient will learn 3 skills to help increase motivation and organization.     Intervention:   Motivational Interviewing: supported patient in identifying and exploring her goals for when her dad leaves again.   Validated patient's emotions about her dad leaving to go back to [place]. Supported patient in exploring her plan to complete her goals. Reviewed breaking down larger tasks into smaller, more manageable pieces. Supported patient in exploring how she can break down the tasks she would like to accomplish. Discussed taking advantage of times of higher motivation levels to complete goals as possible.       Assessments completed prior to visit:  The following assessments were completed  medications  2.  Continues to have continuous headache 24/7.  Occasionally it will go away but then come back.  Is still overusing Tylenol.  Generally uses 1 tablet a day but can use up to 3 tablets a day.  Amitriptyline did help with the headaches and now they are not getting as severe as compared to before.  3.  Still feels dizzy.  Drinking plenty of water.    8/10/23  Patient returns today  1.  No further strokelike symptoms.  Continues to complain of some dizziness.  2.  Headaches have improved with use of the amitriptyline.  She continues to have a very mild continuous headache all the time.  She is only using the Tylenol 1-2 times a week.  The more severe headaches are only 1-2 times a week as well.  Amitriptyline does help without side effects though she would like to try other medications  3.  Drinks plenty of water.  No history of kidney stones.    Previous history is reviewed and this is unchanged.    PAST MEDICAL/SURGICAL HISTORY  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     Microcytic anemia 11/17/2019     S/P coronary artery stent placement 02/02/2018     TB lung, latent     9 mos INH     Patient Active Problem List   Diagnosis     Pulmonary nodule, right     Environmental allergies     TB lung, latent     COPD (chronic obstructive pulmonary disease)/Asthma      Essential hypertension     Cataracts, bilateral     Corneal opacity     Irregular astigmatism     Anxiety     Chronic  by patient for this visit:  PHQ9:       11/15/2022    11:10 AM 7/11/2023     3:49 PM 10/27/2023    11:00 AM 12/8/2023    10:45 AM 1/19/2024    11:00 AM 4/4/2024    12:00 PM 6/20/2024    12:00 PM   PHQ-9 SCORE   PHQ-9 Total Score MyChart  4 (Minimal depression)  10 (Moderate depression)      PHQ-9 Total Score 15 4 15 10 9 11 13     GAD7:       11/20/2023     2:00 PM 1/5/2024    11:00 AM 1/19/2024    11:00 AM 3/1/2024    11:00 AM 4/4/2024    12:00 PM 5/2/2024     1:00 PM 6/20/2024    12:00 PM   LYUBOV-7 SCORE   Total Score 12 9 11 7 7 7 9     PROMIS 10-Global Health (only subscores and total score):       10/13/2023    12:54 AM 10/26/2023    10:36 PM 11/20/2023     2:17 PM 1/19/2024    11:39 AM 2/29/2024    12:43 PM 4/4/2024    12:44 PM 6/20/2024    12:33 PM   PROMIS-10 Scores Only   Global Mental Health Score 10 9 9 10 11 11 10   Global Physical Health Score 16 15 16 17 14 15 16   PROMIS TOTAL - SUBSCORES 26 24 25 27 25 26 26         ASSESSMENT: Current Emotional / Mental Status (status of significant symptoms):   Risk status (Self / Other harm or suicidal ideation)   Patient denies current fears or concerns for personal safety.   Patient denies current or recent suicidal ideation or behaviors.   Patient denies current or recent homicidal ideation or behaviors.   Patient denies current or recent self injurious behavior or ideation.   Patient denies other safety concerns.   Patient reports there has been no change in risk factors since their last session.     Patient reports there has been no change in protective factors since their last session.     Recommended that patient call 911 or go to the local ED should there be a change in any of these risk factors.     Appearance:   Appropriate    Eye Contact:   Good    Psychomotor Behavior: Normal    Attitude:   Cooperative    Orientation:   All   Speech    Rate / Production: Hyperverbal     Volume:  Normal    Mood:    Anxious    Affect:    Worrisome    Thought  nonintractable headache, unspecified headache type     Coronary artery disease due to lipid rich plaque     Dizziness     Hyperlipidemia     Moderate major depression (H)     Moderate persistent asthma     Unspecified visual loss     GERD (gastroesophageal reflux disease)     Dental caries     H/O arterial ischemic stroke     Constipation     Dysphagia     Chronic abdominal pain     Insomnia     FLACO (obstructive sleep apnea)- mild (AHI 14)     Chest pain     Chest pain, unspecified type     Type 2 diabetes mellitus treated with insulin (H)   Significant for arthritis, diabetes, high blood pressure, hyperlipidemia, stroke    FAMILY HISTORY  Family History   Problem Relation Age of Onset     Other - See Comments Mother          of an intestinal problem     Ulcers Father          of gastritis     Breast Cancer No family hx of    Negative for stroke    SOCIAL HISTORY  Social History     Tobacco Use     Smoking status: Former     Packs/day: 1.00     Years: 30.00     Pack years: 30.00     Types: Cigarettes     Quit date: 2003     Years since quittin.6     Passive exposure: Never     Smokeless tobacco: Never     Tobacco comments:     No passive exposure   Vaping Use     Vaping Use: Never used   Substance Use Topics     Alcohol use: No     Drug use: No       SYSTEMS REVIEW  Twelve-system ROS was done and other than the HPI this was negative except for back pain, joint pain, numbness and tingling, weakness/paralysis, difficulty walking, balance/coordination problems, dizziness, headaches, anxiety, cardiac/heart problems, respiratory problems, snoring loudly.  No new concerns    MEDICATIONS  acetaminophen (TYLENOL) 500 MG tablet, TAKE 1 PILL BY MOUTH EVERY 6 HOURS AS NEEDED FOR PAIN (Patient taking differently: Take 500 mg by mouth every 6 hours as needed for mild pain)  albuterol (PROAIR HFA/PROVENTIL HFA/VENTOLIN HFA) 108 (90 Base) MCG/ACT inhaler, Inhale 2 puffs into the lungs every 4 hours as needed for  Content:  Rumination    Thought Form:  Logical    Insight:    Good      Medication Review:   No changes to current psychiatric medication(s)     Medication Compliance:   Yes     Changes in Health Issues:   None reported     Chemical Use Review:   Substance Use: Chemical use reviewed, no active concerns identified      Tobacco Use: No current tobacco use.      Diagnosis:  1. ADHD (attention deficit hyperactivity disorder), inattentive type    2. LYUBOV (generalized anxiety disorder)        Collateral Reports Completed:   Not Applicable    PLAN: (Patient Tasks / Therapist Tasks / Other)  Patient will practice breaking down larger tasks into smaller, more manageable pieces. Patient will take advantage of times of higher motivation levels.   We will discuss next session.         Aline Jimenez, [facility] 7/12/2024    ___________________________________________________________                                              Individual Treatment Plan    Patient's Name: Terssa Jeong  YOB: 1976    Date of Creation: 7/28/2023  Date Treatment Plan Last Reviewed/Revised: 6/20/2024  DSM5 Diagnoses: Attention-Deficit/Hyperactivity Disorder  314.00 (F90.0) Predominantly inattentive presentation or 300.02 (F41.1) Generalized Anxiety Disorder  Psychosocial / Contextual Factors: Patient is currently unemployed. Patient is living with her dad part time. Patient is single.   PROMIS (reviewed every 90 days):The following assessments were completed by patient for this visit:  PROMIS 10-Global Health (only subscores and total score):       10/13/2023    12:54 AM 10/26/2023    10:36 PM 11/20/2023     2:17 PM 1/19/2024    11:39 AM 2/29/2024    12:43 PM 4/4/2024    12:44 PM 6/20/2024    12:33 PM   PROMIS-10 Scores Only   Global Mental Health Score 10 9 9 10 11 11 10   Global Physical Health Score 16 15 16 17 14 15 16   PROMIS TOTAL - SUBSCORES 26 24 25 27 25 26 26        Referral / Collaboration:  Referral to another professional/service  shortness of breath / dyspnea  albuterol (PROVENTIL) (2.5 MG/3ML) 0.083% neb solution, Take 1 vial (2.5 mg) by nebulization every 6 hours as needed for shortness of breath, wheezing or cough  ALLERGY RELIEF 10 MG tablet, TAKE 1 TABLET (10 MG TOTAL) BY MOUTH DAILY FOR ALLERGIES (Patient taking differently: Take 10 mg by mouth daily)  ASPIRIN LOW DOSE 81 MG EC tablet, TAKE 1 TABLET (81 MG TOTAL) BY MOUTH DAILY.  atorvastatin (LIPITOR) 80 MG tablet, TAKE 1 TABLET (80 MG TOTAL) BY MOUTH AT BEDTIME FOR CHOLESTEROL (Patient taking differently: Take 80 mg by mouth daily)  B-D U/F 31G X 8 MM insulin pen needle, USE ONE PEN NEEDLE DAILY AS NEEDED FOR SSI  calcium carbonate (TUMS) 500 MG chewable tablet, Take 1 tablet (500 mg) by mouth 2 times daily as needed for heartburn  colchicine (COLCRYS) 0.6 MG tablet, Take 1 tablet (0.6 mg) by mouth 2 times daily  Continuous Blood Gluc Sensor (FREESTYLE MONICA 2 SENSOR) [facility], 1 each every 14 days Use 1 sensor every 14 days. Use to read blood sugars per 's instructions.  CONTOUR NEXT TEST test strip, USE 1 EACH AS DIRECTED 3 (THREE) TIMES A DAY.  cyclobenzaprine (FLEXERIL) 5 MG tablet, Take 5 mg by mouth 3 times daily as needed for muscle spasms  dextromethorphan (DELSYM) 30 MG/5ML liquid, Take 10 mLs (60 mg) by mouth 2 times daily as needed for cough  diclofenac (VOLTAREN) 1 % topical gel, Apply 4 g topically 4 times daily  docusate sodium (COLACE) 100 MG capsule, Take 1 capsule (100 mg) by mouth 2 times daily as needed for constipation  dupilumab (DUPIXENT) 300 MG/2ML prefilled pen, Inject 2 mLs (300 mg) Subcutaneous every 14 days  famotidine (PEPCID) 20 MG tablet, Take 20 mg by mouth 2 times daily  FLUoxetine (PROZAC) 10 MG capsule, TAKE 1 CAPSULE (10 MG) BY MOUTH DAILY  fluticasone-vilanterol (BREO ELLIPTA) 200-25 MCG/ACT inhaler, Inhale 1 puff into the lungs daily  gabapentin (NEURONTIN) 300 MG capsule, Take 2 capsules (600 mg) by mouth 3 times daily  Global Inject Ease  Lancets 30G MISC, USE 1 EACH AS DIRECTED 3 (THREE) TIMES A DAY. DISPENSE BRAND PER PATIENT'S INSURANCE AT PHARMACY DISCRETION.(E11.9)  hydrochlorothiazide (MICROZIDE) 12.5 MG capsule, TAKE 1 CAPSULE (12.5 MG TOTAL) BY MOUTH DAILY FOR BLOOD PRESSURE  hydrocortisone (CORTAID) 1 % external cream, Apply topically 2 times daily  ibuprofen (ADVIL/MOTRIN) 600 MG tablet, Take 600 mg by mouth every 6 hours as needed for moderate pain  insulin aspart (NOVOLOG PEN) 100 UNIT/ML pen, Inject 10 Units Subcutaneous 2 times daily (with meals)  insulin glargine (LANTUS PEN) 100 UNIT/ML pen, Inject 40 Units Subcutaneous every morning  insulin pen needle (32G X 4 MM) 32G X 4 MM miscellaneous, Use one pen needles daily or as directed.  ipratropium - albuterol 0.5 mg/2.5 mg/3 mL (DUONEB) 0.5-2.5 (3) MG/3ML neb solution, Take 1 vial (3 mLs) by nebulization every 6 hours as needed for shortness of breath / dyspnea or wheezing  lidocaine (XYLOCAINE) 5 % external ointment, Apply topically 3 times daily as needed Small amount (finger tip amount) to chest tid prn pain  meclizine (ANTIVERT) 12.5 MG tablet, TAKE 2 TABLETS (25 MG) BY MOUTH 3 TIMES DAILY AS NEEDED FOR DIZZINESS  metFORMIN (GLUCOPHAGE) 1000 MG tablet, Take 1 tablet (1,000 mg) by mouth 2 times daily (with meals)  metoprolol tartrate (LOPRESSOR) 25 MG tablet, TAKE 1 TABLET (25 MG TOTAL) BY MOUTH 2 (TWO) TIMES A DAY FOR BLOOD PRESSURE  montelukast (SINGULAIR) 10 MG tablet, Take 1 tablet (10 mg) by mouth At Bedtime  nystatin (MYCOSTATIN) 839077 UNIT/ML suspension, Take 5 mLs (500,000 Units) by mouth 4 times daily  omeprazole (PRILOSEC) 40 MG DR capsule, Take 1 capsule (40 mg) by mouth daily  Polyethylene Glycol 3350 (PEG 3350) 17 GM/SCOOP POWD, MIX 17 GRAMS WITH LIQUID AND DRINK ONCE DAILY FOR CONSTIPATION  polyvinyl alcohol (ARTIFICIAL TEARS) 1.4 % ophthalmic solution, Place 1 drop into both eyes as needed for dry eyes  predniSONE (DELTASONE) 20 MG tablet, Take 2 tabs daily x 5  is not indicated at this time.    Anticipated number of session for this episode of care:  50  Anticipation frequency of session: Every other week  Anticipated Duration of each session: 53 or more minutes  Treatment plan will be reviewed in 90 days or when goals have been changed. There has been demonstrated improvement in functioning while patient has been engaged in psychotherapy/psychological service- if withdrawn the patient would deteriorate and/or relapse.     MeasurableTreatment Goal(s) related to diagnosis / functional impairment(s)  Goal 1: Patient will work on stabilizing symptoms of ADHD.      Objective #A  Patient will learn about the diagnosis and symptoms of ADHD.   Status: Continued - Date(s): 6/20/2024    Intervention(s)  Therapist will teach about the diagnosis of ADHD.     Objective #B  Patient will practice setting goals and completing them.                         Status: Continued - Date(s): 6/20/2024     Intervention(s)  Therapist will teach SMART goals and how to break tasks down into smaller tasks.     Objective #C  Patient will learn 3 skills to help increase motivation and organization.   Status: Continued - Date(s): 6/20/2024    Intervention(s)  Therapist will assign homework of using coping skills to increase motivation and organizational skills.    Objective #D  Patient will learn about how ADHD can impact social interactions and interpersonal relationships.  Status:Continued - Date(s): 6/20/2024    Intervention(s)  Therapist will teach about how ADHD can impact social interactions and interpersonal relationships.     Objective #D  Patient will create and adhere to a routine.  Status:Continued - Date(s): 6/20/2024    Intervention(s)  Therapist will assign homework to create a routine and teach about how to maintain routine.         Goal 2: Client will work on stabilizing anxiety symptoms as evidenced by LYUBOV-7 scores (current is 7) and Self report.  Goal is to reduce by three points.       I will know I've met my goal when I can better manage my anxiety .      Objective #A Client will identify triggers of anxiety and process them in session.    Status: Continued - Date(s): 6/20/2024    Intervention(s)  Therapist will teach emotional recognition/identification by assigning homework to journal with written exercises.    Objective #B  Client will identify initial signs or symptoms of anxiety.    Status: Continued - Date(s): 6/20/2024    Intervention(s)  Therapist will teach emotional regulation skills, such as deep breathing and mindfulness skills.    Objective #C  Client will learn about co-morbidities between ADHD and LYUBOV.   Status: Continued - Date(s): 6/20/2024    Intervention(s)  Therapist will provide educational materials on symptoms of ADHD and LYUBOV.          Patient has reviewed and agreed to the above plan.      TYE Marie  July 28, 2023  Reviewed 10/27/2023  Re-reviewed 1/19/2024 Re-reviewed 4/4/2024  Re-reviewed 6/20/2024   days  sucralfate (CARAFATE) 1 GM/10ML suspension, Take 10 mLs (1 g) by mouth 4 times daily  tiotropium (SPIRIVA RESPIMAT) 2.5 MCG/ACT inhaler, Inhale 2 puffs into the lungs daily    No current facility-administered medications on file prior to visit.       PHYSICAL EXAMINATION  VITALS: /84   Pulse 92   Resp 16   Wt 55.8 kg (123 lb)   BMI 19.85 kg/m    GENERAL: Healthy appearing, alert, no acute distress, normal habitus.  CARDIOVASCULAR: Extremities warm and well perfused. Pulses present.   NEUROLOGICAL:  Patient is awake and oriented to self, place and time.  Attention span is normal.  Memory is grossly intact.  Language is fluent and follows commands appropriately.  Appropriate fund of knowledge. Cranial nerves 2-12 are intact. There is no pronator drift.  Motor exam shows 35 strength in all extremities.  Generalized weakness due to poor effort.  Tone is symmetric bilaterally in upper and lower extremities.  (Previously reflexes are symmetric and 1+ in upper extremities and lower extremities. Sensory exam is grossly intact to light touch, pin prick and vibration.)  Finger to nose and heel to shin is without dysmetria.  Romberg is negative.  Gait is normal and the patient is able to do tandem walk and walk on toes and heels with mild difficulty.  Orthostatic vitals    Exam stable.    DIAGNOSTICS  MRI 2022  IMPRESSION:  1.  No acute intracranial abnormalities identified.  2.  Minor chronic small vessel ischemic change, otherwise unremarkable contrast-enhanced MRI of the brain.    MRA                                                               IMPRESSION:  1.  Normal MRA [place].      MRA neck  IMPRESSION:  1.  Normal neck MRA.    Cardiac event monitor    ECHo  Left ventricular size, wall motion and function are normal. The ejection  fraction is 55-60%.  There is borderline anterior, septal, and apical wall hypokinesis.  Normal right ventricle size and systolic function.  No hemodynamically  significant valvular abnormalities on 2D or color flow  Imaging.      CTA 2021  HEAD CT:  1.  No evidence of acute hemorrhage, mass effect, or acute vascular territorial infarction.  2.  Severe left sphenoid sinus disease.     HEAD CTA:   1.  No evidence of proximal large vessel occlusion or flow-limiting stenosis.  2.  Dorsally directed 2 mm contour irregularity arising from the distal left supraclinoid ICA may represent a tiny posterior communicating artery aneurysm.     NECK CTA:  1.  No hemodynamically significant stenosis based on NASCET criteria.  2.  Mild left V1 segment stenosis.  3.  No evidence for dissection.      MRI 2020  IMPRESSION:  MRI BRAIN:  1. No finding for acute infarct, hemorrhage, or mass.  2. There are a few very small foci of FLAIR hyperintensity within the cerebral white matter, nonspecific but compatible with small areas of gliosis and/or chronic myelin loss.     MRA [place]:  1. Congenital variation of the [place] without vascular cutoff, aneurysm, or flow-limiting stenosis.      MRI 2021  HEAD MRI:   1.  Tiny linear focus of diffusion restriction within the right dorsal diomedes suspicious for acute small vessel infarct. This is new compared to 12/07/2020.  2.  No hemorrhagic transformation or mass effect. No additional infarct identified.  3.  Mild presumed microvascular ischemic change within the cerebral white matter.     HEAD MRA:   1.  No aneurysm, high flow AVM or significant stenosis identified.  2.  Variant [place] anatomy as above.     NECK MRA:  1.  Evaluation degraded by suboptimal timing of the contrast bolus and significant venous contamination the gadolinium bolus MRA.  2.  No hemodynamically significant stenosis in the carotid circulation by NASCET criteria.  3.  Poor visualization of the left vertebral artery origin and proximal left V1 segment. This may be artifactual, but it is difficult to exclude high-grade stenosis in this region.      MRI  2021  IMPRESSION:  1.  No mass, hemorrhage, or recent infarct identified.  2.  Diffusion abnormality involving the right ventral diomedes seen on 01/13/2021 is no longer identified. No definite residual signal abnormality in this location to confirm prior infarct. This may relate to small size and slice selection.  3.  Inflammatory changes of the paranasal sinuses including bilateral maxillary air-fluid levels. Correlate with clinical evidence for sinusitis.      RELEVANT LABS  Component      Latest Ref Rng & Units 11/30/2022 2/5/2023   WBC      4.0 - 11.0 10e3/uL  9.3   RBC Count      3.80 - 5.20 10e6/uL  4.59   Hemoglobin      11.7 - 15.7 g/dL  11.8   Hematocrit      35.0 - 47.0 %  38.1   MCV      78 - 100 fL  83   MCH      26.5 - 33.0 pg  25.7 (L)   MCHC      31.5 - 36.5 g/dL  31.0 (L)   RDW      10.0 - 15.0 %  15.1 (H)   Platelet Count      150 - 450 10e3/uL  228   % Neutrophils      %  69   % Lymphocytes      %  14   % Monocytes      %  6   % Eosinophils      %  11   % Basophils      %  0   % Immature Granulocytes      %  0   NRBCs per 100 WBC      <1 /100  0   Absolute Neutrophils      1.6 - 8.3 10e3/uL  6.4   Absolute Lymphocytes      0.8 - 5.3 10e3/uL  1.3   Absolute Monocytes      0.0 - 1.3 10e3/uL  0.6   Absolute Eosinophils      0.0 - 0.7 10e3/uL  1.0 (H)   Absolute Basophils      0.0 - 0.2 10e3/uL  0.0   Absolute Immature Granulocytes      <=0.4 10e3/uL  0.0   Absolute NRBCs      10e3/uL  0.0   Sodium      136 - 145 mmol/L  138   Potassium      3.4 - 5.3 mmol/L  4.2   Chloride      98 - 107 mmol/L  102   Carbon Dioxide (CO2)      22 - 29 mmol/L  25   Anion Gap      7 - 15 mmol/L  11   Urea Nitrogen      6.0 - 20.0 mg/dL  10.6   Creatinine      0.51 - 0.95 mg/dL  0.58   Calcium      8.6 - 10.0 mg/dL  9.5   Glucose      70 - 99 mg/dL  156 (H)   GFR Estimate      >60 mL/min/1.73m2  >90   Hemoglobin A1C      0.0 - 5.6 % 8.6 (H)      OUTSIDE RECORDS  Outside referral notes and chart notes were reviewed and  pertinent information has been summarized (in addition to the HPI):- Dr. Multnai notes reviewed      ER notes above reviewed      IMPRESSION/REPORT/PLAN  Cerebrovascular accident of right pontine structure (H)  Medication overuse headache  Vertigo  Light headed  Intractable chronic migraine without aura and without status migrainosus    This is a 53 year old female history of right pontine stroke thought to be due to vascular risk factors.  Currently she is on aspirin for stroke prevention.  Further management of vascular risk factors per primary care.  Stable.  No change.    She does complain of vertigo and most likely this is related to her stroke.  Meclizine is currently helping and should continue.  She is tried vestibular rehab without any benefit.  Could be related to headaches also.  There might not be any further treatment options available for this.  Stable.  No change.    In terms of her headaches these are most suggestive of medication overuse headaches since she is overusing Tylenol.  Encourage her not to use the Tylenol more than 2-3 times per week; reemphasized this today.  With cutting down on the Tylenol there has been some improvement.  Amitriptyline at higher doses is also helping.  She still has 1-2 severe headaches a week and would like to try other medications.  Will try Topamax.  There is no previous history of kidney stones.  Tylenol is sufficient for abortive therapy.  Her severe headaches do have migraine features and could be migraine headaches.  Amitriptyline was initially prescribed through primary care.    She does complain of some lightheadedness and orthostatic vitals were negative.  Most likely this is unrelated to the stroke.  Further management per primary care.  Encourage good hydration.  Stable.    I can see her back in 2 to 3 months.    -     topiramate (TOPAMAX) 25 MG tablet; Take 1 tablet at night. Can increase to 2 tabs/night if needed.  -     amitriptyline (ELAVIL) 50 MG  tablet; Take 1 tablet (50 mg) by mouth At Bedtime    Return in about 11 weeks (around 10/26/2023) for In-Clinic Visit (must), After testing.    Over 32 minutes were spent coordinating the care for the patient on the day of the encounter.  This includes previsit, during visit and post visit activities as documented above.  Counseling patient.  Prescription management.  Refractory problem.  Use of  requires extra time.  (Activities include but not inclusive of reviewing chart, reviewing outside records, reviewing labs and imaging study results as well as the images, patient visit time including getting history and exam,  use if applicable, review of test results with the patient and coming up with a plan in a shared model, counseling patient and family, education and answering patient questions, EMR , EMR diagnosis entry and problem list management, medication reconciliation and prescription management if applicable, paperwork if applicable, printing documents and documentation of the visit activities.)        Guerrero Prescott MD  Neurologist  [facility]  Tel:- [phone]    This note was dictated using voice recognition software.  Any grammatical or context distortions are unintentional and inherent to the software.      Again, thank you for allowing me to participate in the care of your patient.        Sincerely,        Guerrero Prescott MD

## 2024-07-16 NOTE — TELEPHONE ENCOUNTER
"Writer attempt #2 to call patient with the help of a \"Frisian\"  regarding Refill RN's message below. No answer, left non-detailed voicemail, with clinic call back number.      If patient / family calls back, please gather more information regarding refill request for Fluoxetine. Please route message back to prescribing provider as needed. Thanks.     PAULA AguirreN, RN              [facility]  "

## 2024-07-17 RX ORDER — FLUOXETINE 10 MG/1
10 CAPSULE ORAL DAILY
Qty: 90 CAPSULE | Refills: 1 | OUTPATIENT
Start: 2024-07-17

## 2024-07-17 NOTE — TELEPHONE ENCOUNTER
"Writer attempt #2 to call patient/family with the help of a \"Icelandic\"  regarding clinician's message below. No answer, left non-detailed voicemail, with clinic call back number.     Per daughter-in-law, Billy ([facility] on file) \" We do not need refills for this medication, as patient does not take this medication anymore.\"    No further action needed.    Closing encounter.    PAULA AguirreN, RN   [facility]    "

## 2024-07-18 DIAGNOSIS — Z53.9 DIAGNOSIS NOT YET DEFINED: Primary | ICD-10-CM

## 2024-07-18 PROCEDURE — 99207 PR MD RECERTIFICATION HHA PT: CPT | Performed by: FAMILY MEDICINE

## 2024-07-18 PROCEDURE — G0179 MD RECERTIFICATION HHA PT: HCPCS | Performed by: FAMILY MEDICINE

## 2024-07-25 ENCOUNTER — OFFICE VISIT (OUTPATIENT)
Dept: FAMILY MEDICINE | Facility: CLINIC | Age: 54
End: 2024-07-25
Payer: COMMERCIAL

## 2024-07-25 VITALS
WEIGHT: 126.2 LBS | TEMPERATURE: 98.8 F | DIASTOLIC BLOOD PRESSURE: 77 MMHG | OXYGEN SATURATION: 98 % | RESPIRATION RATE: 16 BRPM | HEART RATE: 79 BPM | SYSTOLIC BLOOD PRESSURE: 123 MMHG | HEIGHT: 60 IN | BODY MASS INDEX: 24.77 KG/M2

## 2024-07-25 DIAGNOSIS — M54.50 CHRONIC LOW BACK PAIN WITHOUT SCIATICA, UNSPECIFIED BACK PAIN LATERALITY: ICD-10-CM

## 2024-07-25 DIAGNOSIS — G89.29 CHRONIC LOW BACK PAIN WITHOUT SCIATICA, UNSPECIFIED BACK PAIN LATERALITY: ICD-10-CM

## 2024-07-25 DIAGNOSIS — I25.83 CORONARY ARTERY DISEASE DUE TO LIPID RICH PLAQUE: Chronic | ICD-10-CM

## 2024-07-25 DIAGNOSIS — J44.9 CHRONIC OBSTRUCTIVE PULMONARY DISEASE, UNSPECIFIED COPD TYPE (H): Chronic | ICD-10-CM

## 2024-07-25 DIAGNOSIS — I10 ESSENTIAL HYPERTENSION: ICD-10-CM

## 2024-07-25 DIAGNOSIS — E11.9 TYPE 2 DIABETES MELLITUS TREATED WITH INSULIN (H): Primary | ICD-10-CM

## 2024-07-25 DIAGNOSIS — J45.40 MODERATE PERSISTENT ASTHMA WITHOUT COMPLICATION: Chronic | ICD-10-CM

## 2024-07-25 DIAGNOSIS — I25.10 CORONARY ARTERY DISEASE DUE TO LIPID RICH PLAQUE: Chronic | ICD-10-CM

## 2024-07-25 DIAGNOSIS — Z79.4 TYPE 2 DIABETES MELLITUS TREATED WITH INSULIN (H): Primary | ICD-10-CM

## 2024-07-25 DIAGNOSIS — K21.9 GASTROESOPHAGEAL REFLUX DISEASE WITHOUT ESOPHAGITIS: ICD-10-CM

## 2024-07-25 PROCEDURE — 99214 OFFICE O/P EST MOD 30 MIN: CPT | Performed by: FAMILY MEDICINE

## 2024-07-25 PROCEDURE — G2211 COMPLEX E/M VISIT ADD ON: HCPCS | Performed by: FAMILY MEDICINE

## 2024-07-25 RX ORDER — BENZONATATE 100 MG/1
100 CAPSULE ORAL 3 TIMES DAILY PRN
COMMUNITY
End: 2024-07-25

## 2024-07-25 RX ORDER — ASPIRIN 81 MG/1
81 TABLET ORAL DAILY
Qty: 90 TABLET | Refills: 1 | Status: SHIPPED | OUTPATIENT
Start: 2024-07-25 | End: 2024-07-25

## 2024-07-25 RX ORDER — ACETAMINOPHEN AND CODEINE PHOSPHATE 300; 30 MG/1; MG/1
1 TABLET ORAL EVERY 6 HOURS PRN
Qty: 30 TABLET | Refills: 0 | Status: SHIPPED | OUTPATIENT
Start: 2024-07-25

## 2024-07-25 RX ORDER — FAMOTIDINE 20 MG/1
20 TABLET, FILM COATED ORAL 2 TIMES DAILY
Qty: 180 TABLET | Refills: 1 | Status: SHIPPED | OUTPATIENT
Start: 2024-07-25 | End: 2024-09-05

## 2024-07-25 RX ORDER — BENZONATATE 100 MG/1
100 CAPSULE ORAL 3 TIMES DAILY PRN
Qty: 90 CAPSULE | Refills: 0 | Status: SHIPPED | OUTPATIENT
Start: 2024-07-25 | End: 2024-09-05

## 2024-07-25 ASSESSMENT — PATIENT HEALTH QUESTIONNAIRE - PHQ9
SUM OF ALL RESPONSES TO PHQ QUESTIONS 1-9: 11
SUM OF ALL RESPONSES TO PHQ QUESTIONS 1-9: 11
10. IF YOU CHECKED OFF ANY PROBLEMS, HOW DIFFICULT HAVE THESE PROBLEMS MADE IT FOR YOU TO DO YOUR WORK, TAKE CARE OF THINGS AT HOME, OR GET ALONG WITH OTHER PEOPLE: VERY DIFFICULT

## 2024-07-25 NOTE — PROGRESS NOTES
Type 2 diabetes mellitus treated with insulin (H)  A1c 7.7 one month ago.   No med changes today.    Chronic low back pain without sciatica, unspecified back pain laterality  Added Tylenol # 3 to take at night.  Discussed potential side effects including dizziness/drowsiness and constipation.  Advised to take only once a day at night.  - acetaminophen-codeine (TYLENOL #3) 300-30 MG per tablet; Take 1 tablet by mouth every 6 hours as needed for severe pain    Essential hypertension  Controlled.    Gastroesophageal reflux disease without esophagitis  Daughter-in-law will call GI to make an appointment.  - famotidine (PEPCID) 20 MG tablet; Take 1 tablet (20 mg) by mouth 2 times daily      Chronic obstructive pulmonary disease, unspecified COPD type (H)  Managed by pulmonology.    Moderate persistent asthma without complication  Managed by pulmonology.    Coronary artery disease due to lipid rich plaque  Managed by cardiology.    Doug Blum is a 54 year old female with multiple chronic medical conditions and seeing multiple specialist here for follow-up.    Her main concern today is worsening back pain in the past few weeks.  Having trouble sleeping at night due to back pain.  No tingling or numbness in the lower extremities.  No acute urinary or bowel symptoms.  She is currently taking gabapentin 600 mg twice a day, no side effects reported.  Patient is asking for stronger pain medication to help her sleep at night.  She used lidocaine patch but it did not help much.    Also complaining of ongoing heartburn symptoms.  She was referred to [facility] by pulmonology for daughter-in-law.  No appointment scheduled yet.  No acute nausea or vomiting.    She has not been checking blood sugar for the past 2 weeks, fasting blood sugar in the low 100s with the highest 140 per granddaughter.  She is using Lantus 44 units at night and NovoLog 16 units before meals, twice a day.  No low blood sugar with hypoglycemic symptoms  reported.    She is requesting Tessalon refill to use as needed for cough.  No acute fever.        7/25/2024     9:44 AM   Additional Questions   Roomed by marco armando   Accompanied by Daughter     History of Present Illness       Reason for visit:  Follow up med check    She eats 0-1 servings of fruits and vegetables daily.She consumes 0 sweetened beverage(s) daily.She exercises with enough effort to increase her heart rate 9 or less minutes per day.  She exercises with enough effort to increase her heart rate 3 or less days per week.   She is taking medications regularly.         Review of Systems  CONSTITUTIONAL: NEGATIVE for fever, chills, change in weight  ENT/MOUTH: NEGATIVE for ear, mouth and throat problems  RESP: NEGATIVE for significant cough or SOB  CV: NEGATIVE for chest pain/chest pressure      Objective    /77 (BP Location: Right arm, Patient Position: Sitting, Cuff Size: Adult Regular)   Pulse 79   Temp 98.8  F (37.1  C) (Oral)   Resp 16   Ht 1.524 m (5')   Wt 57.2 kg (126 lb 3.2 oz)   SpO2 98%   BMI 24.65 kg/m    Body mass index is 24.65 kg/m .    Physical Exam   GENERAL: alert and no distress  NECK: no adenopathy, no asymmetry, masses, or scars  RESP: Mild wheezing with no crackles or rhonchi.  CV: regular rates and rhythm and no murmur, click or rub  ABDOMEN: Epigastric tenderness with no rebound or guarding.  No palpable masses.  BACK: no CVA tenderness, no paralumbar tenderness  PSYCH: mentation appears normal    This transcription uses voice recognition software, which may contain typographical errors.    The longitudinal plan of care for the diagnosis(es)/condition(s) as documented were addressed during this visit. Due to the added complexity in care, I will continue to support Kulwant in the subsequent management and with ongoing continuity of care.          Signed Electronically by: Adrien Cardoza MD

## 2024-08-06 ENCOUNTER — TRANSFERRED RECORDS (OUTPATIENT)
Dept: HEALTH INFORMATION MANAGEMENT | Facility: CLINIC | Age: 54
End: 2024-08-06
Payer: COMMERCIAL

## 2024-08-14 DIAGNOSIS — J44.9 CHRONIC OBSTRUCTIVE PULMONARY DISEASE, UNSPECIFIED COPD TYPE (H): ICD-10-CM

## 2024-08-15 RX ORDER — TIOTROPIUM BROMIDE INHALATION SPRAY 3.12 UG/1
2 SPRAY, METERED RESPIRATORY (INHALATION) DAILY
Qty: 4 G | Refills: 0 | Status: SHIPPED | OUTPATIENT
Start: 2024-08-15 | End: 2024-09-12

## 2024-08-27 DIAGNOSIS — M54.42 CHRONIC BILATERAL LOW BACK PAIN WITH BILATERAL SCIATICA: ICD-10-CM

## 2024-08-27 DIAGNOSIS — G89.29 CHRONIC BILATERAL LOW BACK PAIN WITH BILATERAL SCIATICA: ICD-10-CM

## 2024-08-27 DIAGNOSIS — M54.41 CHRONIC BILATERAL LOW BACK PAIN WITH BILATERAL SCIATICA: ICD-10-CM

## 2024-08-27 DIAGNOSIS — Z76.0 ENCOUNTER FOR MEDICATION REFILL: ICD-10-CM

## 2024-08-27 RX ORDER — GABAPENTIN 600 MG/1
TABLET ORAL
Qty: 90 TABLET | Refills: 3 | Status: SHIPPED | OUTPATIENT
Start: 2024-08-27

## 2024-08-27 NOTE — TELEPHONE ENCOUNTER
Last Written Prescription Date:  5/7/2024  Last Fill Quantity: 90,  # refills: 3   Last office visit: 3/4/2024 ; last virtual visit: Visit date not found with prescribing provider:     Future Office Visit:   Next 5 appointments (look out 90 days)      Sep 05, 2024 11:00 AM  Pharmacist Visit with Malu Gil, Maru  [facility] [address] [place] [address]  [phone]     Oct 10, 2024 11:30 AM  (Arrive by 11:10 AM)  Provider Visit with Adrien Cardoza MD  [facility] [address] [place] [address]  [phone]               Requested Prescriptions   Pending Prescriptions Disp Refills    gabapentin (NEURONTIN) 600 MG tablet [Pharmacy Med Name: GABAPENTIN 600 MG TABS 600 Tablet] 90 tablet 3     Sig: TAKE 1 PILL (600 MG) BY MOUTH 3 TIMES DAILY       There is no refill protocol information for this order

## 2024-09-03 ENCOUNTER — TELEPHONE (OUTPATIENT)
Dept: PULMONOLOGY | Facility: CLINIC | Age: 54
End: 2024-09-03
Payer: COMMERCIAL

## 2024-09-03 DIAGNOSIS — J45.51 SEVERE PERSISTENT ASTHMA WITH EXACERBATION (H): Primary | ICD-10-CM

## 2024-09-03 RX ORDER — PREDNISONE 20 MG/1
TABLET ORAL
Qty: 14 TABLET | Refills: 0 | Status: SHIPPED | OUTPATIENT
Start: 2024-09-03 | End: 2024-09-25

## 2024-09-03 NOTE — TELEPHONE ENCOUNTER
[facility] Call Center    Phone Message    May a detailed message be left on voicemail: yes     Reason for Call: Call received from pt's daughter-in-law Billy reporting that she has had a cough and wheezing X 3-4 days, wondering if Dr. Duong could send some medication in for her to the [facility] in [place]?   For any questions/recommendations, please call daughter Billy.     Action Taken: Other: Pulm    Travel Screening: Not Applicable

## 2024-09-05 ENCOUNTER — OFFICE VISIT (OUTPATIENT)
Dept: PHARMACY | Facility: CLINIC | Age: 54
End: 2024-09-05
Payer: COMMERCIAL

## 2024-09-05 VITALS
SYSTOLIC BLOOD PRESSURE: 112 MMHG | BODY MASS INDEX: 24.71 KG/M2 | HEART RATE: 92 BPM | OXYGEN SATURATION: 94 % | DIASTOLIC BLOOD PRESSURE: 70 MMHG | WEIGHT: 126.5 LBS

## 2024-09-05 DIAGNOSIS — F33.9 RECURRENT MAJOR DEPRESSIVE DISORDER, REMISSION STATUS UNSPECIFIED (H): ICD-10-CM

## 2024-09-05 DIAGNOSIS — I31.9 PERICARDITIS, UNSPECIFIED CHRONICITY, UNSPECIFIED TYPE: ICD-10-CM

## 2024-09-05 DIAGNOSIS — M25.561 PAIN IN BOTH KNEES, UNSPECIFIED CHRONICITY: ICD-10-CM

## 2024-09-05 DIAGNOSIS — G89.29 CHRONIC NONINTRACTABLE HEADACHE, UNSPECIFIED HEADACHE TYPE: ICD-10-CM

## 2024-09-05 DIAGNOSIS — Z79.4 TYPE 2 DIABETES MELLITUS TREATED WITH INSULIN (H): ICD-10-CM

## 2024-09-05 DIAGNOSIS — E11.9 TYPE 2 DIABETES MELLITUS TREATED WITH INSULIN (H): ICD-10-CM

## 2024-09-05 DIAGNOSIS — J45.50 SEVERE PERSISTENT ASTHMA, UNSPECIFIED WHETHER COMPLICATED (H): ICD-10-CM

## 2024-09-05 DIAGNOSIS — M25.562 PAIN IN BOTH KNEES, UNSPECIFIED CHRONICITY: ICD-10-CM

## 2024-09-05 DIAGNOSIS — K59.00 CONSTIPATION, UNSPECIFIED CONSTIPATION TYPE: Primary | ICD-10-CM

## 2024-09-05 DIAGNOSIS — E78.5 HYPERLIPIDEMIA, UNSPECIFIED HYPERLIPIDEMIA TYPE: ICD-10-CM

## 2024-09-05 DIAGNOSIS — R51.9 CHRONIC NONINTRACTABLE HEADACHE, UNSPECIFIED HEADACHE TYPE: ICD-10-CM

## 2024-09-05 DIAGNOSIS — Z78.9 TAKES DIETARY SUPPLEMENTS: ICD-10-CM

## 2024-09-05 DIAGNOSIS — I10 ESSENTIAL HYPERTENSION: ICD-10-CM

## 2024-09-05 DIAGNOSIS — K21.9 GASTROESOPHAGEAL REFLUX DISEASE WITHOUT ESOPHAGITIS: Chronic | ICD-10-CM

## 2024-09-05 PROCEDURE — 99606 MTMS BY PHARM EST 15 MIN: CPT | Performed by: PHARMACIST

## 2024-09-05 PROCEDURE — 99607 MTMS BY PHARM ADDL 15 MIN: CPT | Performed by: PHARMACIST

## 2024-09-05 RX ORDER — ESOMEPRAZOLE MAGNESIUM 40 MG
40 CAPSULE,DELAYED RELEASE (ENTERIC COATED) ORAL
COMMUNITY
Start: 2024-08-06

## 2024-09-05 RX ORDER — ASPIRIN 81 MG/1
81 TABLET ORAL DAILY
COMMUNITY

## 2024-09-05 RX ORDER — SWAB
1 SWAB, NON-MEDICATED MISCELLANEOUS DAILY
Qty: 90 CAPSULE | Refills: 3 | Status: SHIPPED | OUTPATIENT
Start: 2024-09-05

## 2024-09-05 RX ORDER — SUCRALFATE ORAL 1 G/10ML
1 SUSPENSION ORAL 4 TIMES DAILY
Qty: 3600 ML | Refills: 1 | Status: SHIPPED | OUTPATIENT
Start: 2024-09-05

## 2024-09-05 RX ORDER — CALCIUM CARBONATE 500 MG/1
1 TABLET, CHEWABLE ORAL 2 TIMES DAILY PRN
COMMUNITY

## 2024-09-05 RX ORDER — SENNOSIDES 8.6 MG
1 TABLET ORAL DAILY PRN
Qty: 30 TABLET | Refills: 3 | Status: SHIPPED | OUTPATIENT
Start: 2024-09-05

## 2024-09-05 NOTE — Clinical Note
Just wanted to bring to your attention that patient continues to have well controlled BP and pretty persistent dizziness.   Patient currently on beta blocker and diuretic. Could consider switching therapy to low dose ACE/ARB rather than BB to help with dizziness. Please let me know your thoughts or if you would prefer to see patient to discuss in more detail.   Thank you Malu Gil, PharmD, BCACP Medication Therapy Management Pharmacist

## 2024-09-05 NOTE — PATIENT INSTRUCTIONS
"Recommendations from today's MTM visit:                                                         Start Senna 8.6 mg daily as needed for constipation  Continue Lantus 40 units daily and Novolog 16 units twice daily before   Refill sent for sucralfate and Vitamin D today    Follow-up: No follow-ups on file.    It was great speaking with you today.  I value your experience and would be very thankful for your time in providing feedback in our clinic survey. In the next few days, you may receive an email or text message from [employer] with a link to a survey related to your  clinical pharmacist.\"     To schedule another MTM appointment, please call the clinic directly or you may call the MTM scheduling line at [phone].    My Clinical Pharmacist's contact information:                                                      Please feel free to contact me with any questions or concerns you have.      Malu Gil, PharmD, [facility]  Medication Therapy Management Pharmacist    "

## 2024-09-05 NOTE — PROGRESS NOTES
Medication Therapy Management (MTM) Encounter    ASSESSMENT:                            Medication Adherence/Access: No issues identified    1. Type 2 diabetes mellitus treated with insulin (H)  A1c not yet due for recheck, will recheck at next PCP visit.  Recently improved, still not at goal <7%, though blood sugars per CGM are meeting goal >70% in target.  Therefore, would recommend patient continue current insulin doses, will send updated prescription to pharmacy today.  Warned patient that I anticipate higher blood sugars with current course of prednisone.  Recommend patient contact MTM pharmacist if experiencing hypoglycemia.  Could consider addition of GLP-1 RA to decrease insulin requirement in the future, though would recommend avoiding SGLT2 inhibitors at this time given current symptoms of dizziness.  These medications could be considered for cardiorenal benefits.    2. Essential hypertension  BP at goal <130/80 mmHg. Could consider discontinuing diuretic and alternatively reassessing need for additional antihypertensive.  If needed, would recommend adding ACE/ARB at low-dose and slowly titrating.  Recommended patient contact cardiologist if dizziness persists or does not improve.    3. Hyperlipidemia, unspecified hyperlipidemia type  LDL at goal, continue current therapy.    4. Severe persistent asthma/allergies  Managed by patient's pulmonologist and allergist, recommended patient call to schedule follow-up with allergist in September as directed per chart review.    5. Pain in both knees, unspecified chronicity  Patient appropriately taking current medication.  Directed patient not to exceed acetaminophen 3000 to 4000 mg daily.    6. Recurrent major depressive disorder, remission status unspecified (H24)  Stable.    7. Chronic nonintractable headache, unspecified headache type  Stable, managed by neurology.    8. Gastroesophageal reflux disease without esophagitis  Refill sent for patient to continue  sucralfate.  Removed famotidine from medication list as patient no longer taking.      9. Constipation, unspecified constipation type  Patient would benefit from adding senna to help with constipation, sent prescription to pharmacy today.  Removed stool softener from medication list as medication previously ineffective.  Though in the future, could consider combination stool softener/laxative if needed.    10. Pericarditis, unspecified chronicity, unspecified type  Stable.    11. Takes dietary supplements  Reasonable to continue vitamin D supplement, prescription sent to pharmacy today to check if covered by patient's insurance.    PLAN:                            Start Senna 8.6 mg daily as needed for constipation  Continue Lantus 40 units daily and Novolog 16 units twice daily before   Refill sent for sucralfate and Vitamin D today    Medication issues to be addressed at a future visit:   A1c next visit  Consider GLP-1    Follow-up: No follow-ups on file.    SUBJECTIVE/OBJECTIVE:                          Kulwant Amin is a 54 year old female seen for a follow-up visit. Patient was accompanied by Daughter in law.  (ID# [id number]) was used during today's visit.        Reason for visit: MTM follow up.    Allergies/ADRs: Reviewed in chart  Past Medical History: Reviewed in chart  Tobacco: She reports that she quit smoking about 21 years ago. Her smoking use included cigarettes. She started smoking about 51 years ago. She has a 30 pack-year smoking history. She has never been exposed to tobacco smoke. She has never used smokeless tobacco.  Alcohol: none    Medication Adherence/Access: Patient has a home health nurse that sets up a three times daily pillbox. Patient brings with her medication vials today but does not bring her pillbox. Reports no missed doses.     Diabetes   Metformin  mg 2 tablets twice daily with food  Lantus 40 units once daily at night (decreased from 44 since her sugars  improved)  Novolog 16 units twice daily 10 minutes before meals (not using the 20 units as prescribed because her blood sugar improved)  Daughter in law helps patient with insulin administration, no missed doses.   Reports no medication side effects.   Blood sugar monitoring: Freestyle Gemma CGM; see below.  Current diabetes symptoms: none - aware of how to correct for lows if needed.   Diet/Exercise: Reports eating 2 meals per day, 11 am and 6:30-7, usually eats brown rice, beans, lentils, veggies. Has decreased portion size and eating better.   Med Hx: metformin IR (nausea/vomiting)     Eye exam is up to date  Foot exam is up to date       Hypertension /CAD/SVT  Hydrochlorothiazide 12.5 mg daily  Metoprolol tartrate 25 mg twice daily   Patient reports the following medication side effects: dizziness which has been the same as before. Reports feeling dizzy 1-2 times daily. No falls but feels like might fall when she feels dizzy. Worried that BP will be too high if medications are changed, therefore wants to continue current therapy without change today.   Patient self-monitors blood pressure, occasionally, recalls 105/80 when feeling dizzy.   Cardiology: Dr. Hirsch     Hyperlipidemia /Hx pontine stroke  Atorvastatin 80 mg daily  Aspirin 81 mg daily (per neurology)   Patient reports muscle pains bilaterally in her legs, but points to R knee. States that this has been present for a long time and her doctors are already aware of this.   The ASCVD Risk score (Doris CANDELARIO, et al., 2019) failed to calculate for the following reasons:    The patient has a prior MI or stroke diagnosis     Recent Labs   Lab Test 05/23/24  1054 03/14/23  1140   CHOL 128 125   HDL 63 41*   LDL 38 41   TRIG 137 214*     Allergies/COPD/asthma/hypereosinophilia:   Prednisone 20mg tablets  2 tablets once daily - started yesterday per pulmonology  Montelukast 10 mg daily at bedtime  Breo Ellipta 200/25mcg 1 puff daily at night  Spiriva 2.5 mcg 2  puffs daily at night  Albuterol HFA daily as needed - taking at least daily right now  Albuterol nebs three times daily   States that breathing has been the same, still having shortness of breath and wheezing.   Pulmonologist: [facility], Tamra Mohr.   Allergy & immunology: [facility], Dr. Elizabeth Marie. Per chart, she needs to be seen by Dr. Marie, daughter in law plans to call and schedule visit - [phone]     Pain  Acetaminophen 500mg Q6h PRN pain - using 500 mg three times daily every day  Gabapentin 600 mg three times daily  Ferrous sulfate 325 daily - per juan for leg cramps  Diclofenac 1% gel 4g 4 times daily - and this helps a lot  Acetaminophen-codeine 300/30 mg every 6 hours as needed for severe pain - using max once daily and only as needed  Thinks that her pain right now is tolerable.     Depression  Fluoxetine 10 mg daily  Notes still some ups and downs with mood, but feels medication is helpful. Admits that she has memory issues and is still forgetful.     Migraine/headache  Amitriptyline 50 mg daily at bedtime  Emgality every 28 days- currently on hold, but may be restarting soon, upcoming visit  Seeing neurologist, Dr. Prescott.     GERD  Esomeprazole 40 mg daily in the morning  Sucralfate 1g 3 times daily - does not bring with today but still taking, requesting refill  Tums 1 tablet twice daily PRN   Reports well controlled symptoms with current regimen, managed by gastroenterology.  Patient previously taking omeprazole which was recently switched to esomeprazole per GI specialist.    Constipation  Docusate 100 mg 1 caps twice daily - reports does not have this anymore, was told to stop taking this because it didn't work for her.   Miralax 17 g daily  States that she has to use more than 17 g miralax per day in order to help control constipation. States that if she has constipation then she also has issues with hemorrhoids which has lead to bleeding per rectum in the  past.     Pericarditis    Colchicine 0.6 mg daily (started 7/24)  Reports improvement of chest pain.   Rx per Dr. Hirsch    Takes supplement:   Vitamin D 400 international units daily  States that she has been taking this for mouth sores and finds it helpful, would like to get this as prescription.     Today's Vitals: /70   Pulse 92   Wt 126 lb 8 oz (57.4 kg)   SpO2 94%   BMI 24.71 kg/m    ----------------      I spent 45 minutes with this patient today. All changes were made via collaborative practice agreement with Adrien Cardoza MD. A copy of the visit note was provided to the patient's provider(s).    A summary of these recommendations was sent via [employer].    Malu Gil, PharmD, [facility]  Medication Therapy Management Pharmacist     Medication Therapy Recommendations  Constipation    Current Medication: sennosides (SENOKOT) 8.6 MG tablet   Rationale: Synergistic therapy - Needs additional medication therapy - Indication   Recommendation: Start Medication   Status: Accepted per CPA         Type 2 diabetes mellitus treated with insulin (H)    Current Medication: insulin glargine (LANTUS PEN) 100 UNIT/ML pen   Rationale: Dose too high - Dosage too high - Safety   Recommendation: Decrease Dose   Status: Accepted per CPA

## 2024-09-09 NOTE — TELEPHONE ENCOUNTER
Called son to relay PT ordered.  Son states already for a year has had a PT come to pt home for her wrist, shoulders, and legs.  PT is coming to pt home tomorrow and asked pt son to have him call the clinic.      Per son, they would prefer in home PT and not outside PT. Thanks.   
Can they have home PT request order for back pain? I do not know which agency they use to order.    Dr. Adrien Cardoza  6/8/2021 9:04 AM    
Nor does [facility] that is why [facility] asked pt son to have PT call clinic tomorrow when PT in home with pt.  Thanks.   
PT referral order placed. Please let the patient know.  Dr. Adrien Cardoza  6/8/2021 7:59 AM    
Who is calling:  Son    Reason for Call:  X-ray results of Lumbar spine.    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 No spondylolisthesis. Vertebral body heights are preserved. Degenerative disc height loss at L5-S1. Mild facet arthropathy in the lower lumbar spine. Atherosclerotic calcification of the abdominal   aorta. Degenerative changes of the sacroiliac joints.    Per INF notes, PCP wrote:      Bilateral low back pain with bilateral sciatica, unspecified chronicity  Increase the gabapentin to 300 mg 3 times daily from twice daily.  Will consider MRI  - XR Lumbar Spine 2 or 3 VWS    Relayed to son, some degenerative changes in Lumbar spine along with left curvature.  Does PCP wish MRI to be ordered.  Also relayed Gabapentin increased to 3x a day from 2 x.  Son understood.     Date of last appointment with primary care:   6/4/21    Okay to leave a detailed message: Yes    Call taken on 6/7/21 at 1130 am by Lisa Martinez CMA, CMT  
Speaking Coherently

## 2024-09-12 DIAGNOSIS — J44.9 CHRONIC OBSTRUCTIVE PULMONARY DISEASE, UNSPECIFIED COPD TYPE (H): ICD-10-CM

## 2024-09-12 DIAGNOSIS — R25.2 LEG CRAMPS: ICD-10-CM

## 2024-09-12 DIAGNOSIS — R79.0 LOW FERRITIN: ICD-10-CM

## 2024-09-12 RX ORDER — TIOTROPIUM BROMIDE INHALATION SPRAY 3.12 UG/1
2 SPRAY, METERED RESPIRATORY (INHALATION) DAILY
Qty: 4 G | Refills: 0 | Status: SHIPPED | OUTPATIENT
Start: 2024-09-12

## 2024-09-12 RX ORDER — FERROUS SULFATE 325(65) MG
325 TABLET ORAL
Qty: 30 TABLET | Refills: 0 | Status: SHIPPED | OUTPATIENT
Start: 2024-09-12

## 2024-09-12 NOTE — TELEPHONE ENCOUNTER
Refill request for: ferrous sulfate (FEROSUL) 325 (65 Fe) MG tablet    Directions: Take 1 tablet (325 mg) by mouth daily (with breakfast)     LOV: 03/18/24  NOV: 09/18/24    Last ferritin level drawn 11/7/23, level=26    30 day supply with 0 refills Medication T'd for review and signature    Urvashi Saha LPN on 9/12/2024 at 12:17 PM

## 2024-09-13 ENCOUNTER — APPOINTMENT (OUTPATIENT)
Dept: RADIOLOGY | Facility: HOSPITAL | Age: 54
End: 2024-09-13
Attending: PHYSICIAN ASSISTANT
Payer: COMMERCIAL

## 2024-09-13 ENCOUNTER — HOSPITAL ENCOUNTER (EMERGENCY)
Facility: HOSPITAL | Age: 54
Discharge: HOME | End: 2024-09-13
Admitting: PHYSICIAN ASSISTANT
Payer: COMMERCIAL

## 2024-09-13 VITALS
TEMPERATURE: 97.7 F | BODY MASS INDEX: 24.41 KG/M2 | RESPIRATION RATE: 20 BRPM | SYSTOLIC BLOOD PRESSURE: 118 MMHG | HEART RATE: 94 BPM | WEIGHT: 125 LBS | OXYGEN SATURATION: 94 % | DIASTOLIC BLOOD PRESSURE: 69 MMHG

## 2024-09-13 DIAGNOSIS — U07.1 COVID-19: ICD-10-CM

## 2024-09-13 DIAGNOSIS — J44.1 COPD EXACERBATION (H): ICD-10-CM

## 2024-09-13 DIAGNOSIS — R06.02 SHORTNESS OF BREATH: ICD-10-CM

## 2024-09-13 LAB
FLUAV RNA SPEC QL NAA+PROBE: NEGATIVE
FLUBV RNA RESP QL NAA+PROBE: NEGATIVE
RSV RNA SPEC NAA+PROBE: NEGATIVE
SARS-COV-2 RNA RESP QL NAA+PROBE: POSITIVE

## 2024-09-13 PROCEDURE — 250N000012 HC RX MED GY IP 250 OP 636 PS 637: Performed by: PHYSICIAN ASSISTANT

## 2024-09-13 PROCEDURE — 87637 SARSCOV2&INF A&B&RSV AMP PRB: CPT | Performed by: PHYSICIAN ASSISTANT

## 2024-09-13 PROCEDURE — 71046 X-RAY EXAM CHEST 2 VIEWS: CPT

## 2024-09-13 PROCEDURE — 99284 EMERGENCY DEPT VISIT MOD MDM: CPT | Mod: 25

## 2024-09-13 PROCEDURE — 94640 AIRWAY INHALATION TREATMENT: CPT

## 2024-09-13 PROCEDURE — 250N000009 HC RX 250: Performed by: PHYSICIAN ASSISTANT

## 2024-09-13 RX ORDER — IBUPROFEN 200 MG
400 TABLET ORAL EVERY 8 HOURS PRN
Qty: 60 TABLET | Refills: 0 | Status: ON HOLD | OUTPATIENT
Start: 2024-09-13 | End: 2024-10-01

## 2024-09-13 RX ORDER — PREDNISONE 20 MG/1
TABLET ORAL
Qty: 10 TABLET | Refills: 0 | Status: SHIPPED | OUTPATIENT
Start: 2024-09-13 | End: 2024-09-25

## 2024-09-13 RX ORDER — IPRATROPIUM BROMIDE AND ALBUTEROL SULFATE 2.5; .5 MG/3ML; MG/3ML
3 SOLUTION RESPIRATORY (INHALATION) ONCE
Status: COMPLETED | OUTPATIENT
Start: 2024-09-13 | End: 2024-09-13

## 2024-09-13 RX ORDER — PREDNISONE 20 MG/1
60 TABLET ORAL ONCE
Status: COMPLETED | OUTPATIENT
Start: 2024-09-13 | End: 2024-09-13

## 2024-09-13 RX ADMIN — IPRATROPIUM BROMIDE AND ALBUTEROL SULFATE 3 ML: .5; 3 SOLUTION RESPIRATORY (INHALATION) at 08:33

## 2024-09-13 RX ADMIN — PREDNISONE 60 MG: 20 TABLET ORAL at 08:32

## 2024-09-13 ASSESSMENT — ACTIVITIES OF DAILY LIVING (ADL): ADLS_ACUITY_SCORE: 38

## 2024-09-13 NOTE — DISCHARGE INSTRUCTIONS
You were seen here in the emergency department for evaluation of cough, shortness of breath, wheezing.  Your testing here is positive for COVID, which is undoubtably causing your symptoms and causing your shortness of breath and COPD to worsen.  Continue at home with your nebulizer treatment including your inhaler, as well as your nebulizers with albuterol.  I am going to send you home on some steroids, for the next 5 days as well as put you on Paxlovid which is a treatment for COVID.  Follow-up with your primary care doctor.  Return to the emergency department with new or worsening symptoms.    WHILE TAKING THE PAXLOVID:   DO NOT TAKE YOUR ATORVASTATIN   DO NOT TAKE COLCHICINE     Resume these medications after stopping paxlovid    For pain or fever you may use:  -Tylenol 650 mg every 6 hours.  Max 4000 mg in 24 hours  Do not use thismedication with alcohol as it can cause liver problems.  -Ibuprofen 600 mg every 6 hours.  Max 3500 mg in 24 hours  Do not take this medication if you have a history of a GI bleed or have kidney problems.  You may use both of these medications at the same time or you can alternate them every 3 hours.  For example, Tylenol at 6 AM, ibuprofen at 9 AM, Tylenol at noon, etc.

## 2024-09-13 NOTE — ED PROVIDER NOTES
EMERGENCY DEPARTMENT ENCOUNTER      NAME: Kulwant Amin  AGE: 54 year old female  YOB: 1970  MRN: [id number]  EVALUATION DATE & TIME: No admission date for patient encounter.    PCP: Adrien Cardoza    ED PROVIDER: Jc Peralta PA-C      Chief Complaint   Patient presents with    Cough    Shortness of Breath         FINAL IMPRESSION:  1. COPD exacerbation (H)    2. Shortness of breath    3. COVID-19          ED COURSE & MEDICAL DECISION MAKING:    Pertinent Labs & Imaging studies reviewed. (See chart for details)  8:18 AM I met the patient and performed my initial interview and exam.   9:44 AM Patient seen and updated. Breathing improved.   9:48 AM imaging negative, plan for discharge.      54 year old female presents to the Emergency Department for evaluation of shortness of breath, cough, fever.     ED Course as of 09/24/24 1149   Fri Sep 13, 2024   0820 Patient is a 54-year-old female, past medical history of chronic TB, COPD, asthma, hyperlipidemia, diabetes, hypertension, who presents to the emergency department for evaluation of cough, shortness of breath, fevers.  Symptoms reportedly started 3 days ago.  Patient on arrival here, vitally stable, not febrile tachycardic tachypneic.  Not hypoxic.  Patient does have breathing treatments at home, has been doing albuterol inhaler, as well as nebulizers at home, this has not been helping.  Symptoms been ongoing for 3 days.  Daughter here with the patient reports that the patient  at home is positive for COVID.  Patient more likely has COVID here at this point given symptoms.  On examination, she does have some diffuse wheezing.  She denies any abdominal pain, does not have any rebound or guarding.  Denies any significant chest pain.  Frequent productive cough.  Will give a DuoNeb.  The emergency department, as well as some steroids.  Patient will undergo x-ray here in the emergency department for rule out of possible pneumonia.  She is  otherwise not hypoxic, not requiring additional oxygenation, does not appear to be in any acute distress.  Suspect likely viral etiology.   0933 SARS CoV2 PCR(!): Positive   0941 I have independently reviewed the x-ray of the chest here, no significant effusion or pneumothorax.  Pending final radiology read.   0944 Patient seen and reevaluated, wheezing has improved here in the emergency department after DuoNeb and steroids.  Positive for COVID which is likely causing patient's symptoms.   0948 Chest XR,  PA & LAT  X-ray of the chest here shows coronary artery stents, symmetric lung inflation, lower lobes underinflated, no interstitial or alveolar opacities.  No pleural effusion.   0948   Patient will be discharged on Paxil bid, as well as some prednisone here in the emergency department, suspect likely COPD exacerbation secondary to COVID infection.  Daughter agreeable with the plan.       Medical Decision Making  Obtained supplemental history:Supplemental history obtained?: No  Reviewed external records: External records reviewed?: Outpatient Record: Outpatient allergy medicine visit yesterday, outpatient office visit on 09/5/2024, outpatient office visit on 08/6/2024  Care impacted by chronic illness:Chronic Lung Disease, Diabetes, Hyperlipidemia, Hypertension, and Mental Health  Care significantly affected by social determinants of health:N/A  Did you consider but not order tests?: Work up considered but not performed and documented in chart, if applicable  Did you interpret images independently?: Independent interpretation of ECG and images noted in documentation, when applicable.  Consultation discussion with other provider:Did you involve another provider (consultant, , pharmacy, etc.)?: No  Discharge. I prescribed additional prescription strength medication(s) as charted. N/A.  Not Applicable             At the conclusion of the encounter I discussed the results of all of the tests and the disposition. The  questions were answered. The patient or family acknowledged understanding and was agreeable with the care plan.       0 minutes of critical care time     MEDICATIONS GIVEN IN THE EMERGENCY:  Medications   ipratropium - albuterol 0.5 mg/2.5 mg/3 mL (DUONEB) neb solution 3 mL (3 mLs Nebulization $Given 9/13/24 0833)   predniSONE (DELTASONE) tablet 60 mg (60 mg Oral $Given 9/13/24 0832)       NEW PRESCRIPTIONS STARTED AT TODAY'S ER VISIT  Discharge Medication List as of 9/13/2024  9:53 AM        START taking these medications    Details   ibuprofen (ADVIL/MOTRIN) 200 MG tablet Take 2 tablets (400 mg) by mouth every 8 hours as needed for pain., Disp-60 tablet, R-0, E-Prescribe      nirmatrelvir and ritonavir (PAXLOVID) 300 mg/100 mg therapy pack Take 3 tablets by mouth 2 times daily for 5 days., Disp-30 tablet, R-0, E-PrescribeDate of symptom onset: 09/12/2024; Risk criteria met: Yes; Weight >40 kg Yes; Renal fxn: normal;  Drug-Drug interactions reviewed & addressed: Yes      !! predniSONE (DELTASONE) 20 MG tablet Take two tablets (= 40mg) each day for 5 (five) days, Disp-10 tablet, R-0, E-Prescribe       !! - Potential duplicate medications found. Please discuss with provider.             =================================================================    HPI    Patient information was obtained from: Patient     Use of :Phone, Pepito Blum JEANNINE Amin is a 54 year old female with a pertinent history of pulmonary nodule, coronary artery disease, headache, anxiety, COPD, latent TB, hypertension, hyperlipidemia, who presents to this ED for evaluation of cough, fever, diarrhea, and shortness of breath that started 3 days ago.  Per daughter, has been doing nebs, inhaler, and has not been helping at home.  Symptoms ongoing for 3 days.   reportedly has COVID at home.    PAST MEDICAL HISTORY:  Past Medical History:   Diagnosis Date    Acute asthma exacerbation 01/06/2020    Anxiety     Arthritis      Asthma exacerbation 11/19/2015    Chronic obstructive pulmonary disease, unspecified COPD type (H)     COPD (chronic obstructive pulmonary disease) (H)     Coronary artery disease due to lipid rich plaque     Depression     Epigastric pain 12/15/2021    Essential hypertension     GERD (gastroesophageal reflux disease)     Infection due to 2019 novel coronavirus 01/03/2022    positive with COVID-19 on January 3, 2022    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    Microcytic anemia 11/17/2019    S/P coronary artery stent placement 02/02/2018    TB lung, latent     9 mos INH       PAST SURGICAL HISTORY:  Past Surgical History:   Procedure Laterality Date    CORONARY STENT PLACEMENT  2018    CV CORONARY ANGIOGRAM N/A 1/25/2018    Procedure: Coronary Angiogram;  Surgeon: Angelo Serrano MD;  Location: [facility] Cath Lab;  Service:     CV CORONARY ANGIOGRAM N/A 5/5/2022    Procedure: Coronary Angiogram;  Surgeon: Irwin Cano MD;  Location: [facility] CATH LAB CV    CV CORONARY ANGIOGRAM  5/5/2022    Procedure: ;  Surgeon: Irwin Cano MD;  Location: [facility] CATH LAB CV    HI ESOPHAGOGASTRODUODENOSCOPY TRANSORAL DIAGNOSTIC N/A 12/10/2018    Procedure: ESOPHAGOGASTRODUODENOSCOPY (EGD);  Surgeon: Eddie Renteria MD;  Location: [facility];  Service: Gastroenterology    HI ESOPHAGOGASTRODUODENOSCOPY TRANSORAL DIAGNOSTIC N/A 12/3/2020    Procedure: ESOPHAGOGASTRODUODENOSCOPY (EGD) with biospies ;  Surgeon: Avi Crow MD;  Location: [facility];  Service: Gastroenterology    [facility] COLONOSCOPY W/WO BRUSH/WASH N/A 12/10/2018    Procedure: COLONOSCOPY with polypectomy using biopsy forceps;  Surgeon: Eddie Renteria MD;  Location: [facility];  Service: Gastroenterology     CURRENT MEDICATIONS:    ibuprofen (ADVIL/MOTRIN) 200 MG tablet  predniSONE (DELTASONE) 20 MG tablet  acetaminophen (TYLENOL) 500 MG tablet  acetaminophen-codeine (TYLENOL #3) 300-30 MG per tablet  albuterol (PROAIR HFA/PROVENTIL  HFA/VENTOLIN HFA) 108 (90 Base) MCG/ACT inhaler  albuterol (PROVENTIL) (2.5 MG/3ML) 0.083% neb solution  amitriptyline (ELAVIL) 50 MG tablet  aspirin 81 MG EC tablet  atorvastatin (LIPITOR) 80 MG tablet  calcium carbonate (TUMS) 500 MG chewable tablet  colchicine (COLCRYS) 0.6 MG tablet  Continuous Blood Gluc Sensor (FREESTYLE MONICA 2 SENSOR) MISC  diclofenac (VOLTAREN) 1 % topical gel  FEROSUL 325 (65 Fe) MG tablet  fluticasone-vilanterol (BREO ELLIPTA) 200-25 MCG/ACT inhaler  gabapentin (NEURONTIN) 600 MG tablet  hydrochlorothiazide (MICROZIDE) 12.5 MG capsule  insulin aspart (NOVOLOG PEN) 100 UNIT/ML pen  insulin glargine (LANTUS PEN) 100 UNIT/ML pen  insulin pen needle (32G X 4 MM) 32G X 4 MM miscellaneous  Lidocaine (LIDOCARE) 4 % Patch  metFORMIN (GLUCOPHAGE XR) 500 MG 24 hr tablet  metoprolol tartrate (LOPRESSOR) 25 MG tablet  montelukast (SINGULAIR) 10 MG tablet  NEXIUM 40 MG DR capsule  polyethylene glycol (MIRALAX) 17 GM/Dose powder  predniSONE (DELTASONE) 20 MG tablet  sennosides (SENOKOT) 8.6 MG tablet  SPIRIVA RESPIMAT 2.5 MCG/ACT inhaler  sucralfate (CARAFATE) 1 GM/10ML suspension  vitamin D3 (CHOLECALCIFEROL) 10 MCG (400 UNIT) capsule         ALLERGIES:  No Known Allergies    FAMILY HISTORY:  Family History   Problem Relation Age of Onset    Other - See Comments Mother          of an intestinal problem    Ulcers Father          of gastritis    Breast Cancer No family hx of     Ovarian Cancer No family hx of     Colon Cancer No family hx of        SOCIAL HISTORY:   Social History     Socioeconomic History    Marital status:    Tobacco Use    Smoking status: Former     Current packs/day: 0.00     Average packs/day: 1 pack/day for 30.0 years (30.0 ttl pk-yrs)     Types: Cigarettes     Start date: 1973     Quit date: 2003     Years since quittin.7     Passive exposure: Never    Smokeless tobacco: Never    Tobacco comments:     No passive exposure   Vaping Use    Vaping status:  Never Used   Substance and Sexual Activity    Alcohol use: No    Drug use: No    Sexual activity: Yes     Partners: Male   Social History Narrative    12/22/2017 The patient lives with her daughter-in-law (who is present), , son, and 2 grandchildren (total of 6 people). Immigrant.     Social Determinants of Health     Financial Resource Strain: Low Risk  (2/15/2024)    Financial Resource Strain     Within the past 12 months, have you or your family members you live with been unable to get utilities (heat, electricity) when it was really needed?: No   Food Insecurity: Low Risk  (2/15/2024)    Food Insecurity     Within the past 12 months, did you worry that your food would run out before you got money to buy more?: No     Within the past 12 months, did the food you bought just not last and you didn t have money to get more?: No   Transportation Needs: Low Risk  (2/15/2024)    Transportation Needs     Within the past 12 months, has lack of transportation kept you from medical appointments, getting your medicines, non-medical meetings or appointments, work, or from getting things that you need?: No   Interpersonal Safety: Unknown (5/23/2024)    Interpersonal Safety     Do you feel physically and emotionally safe where you currently live?: Patient unable to answer     Within the past 12 months, have you been hit, slapped, kicked or otherwise physically hurt by someone?: Patient unable to answer     Within the past 12 months, have you been humiliated or emotionally abused in other ways by your partner or ex-partner?: Patient unable to answer   Housing Stability: Low Risk  (2/15/2024)    Housing Stability     Do you have housing? : Yes     Are you worried about losing your housing?: No       VITALS:  /69   Pulse 94   Temp 97.7  F (36.5  C) (Temporal)   Resp 20   Wt 56.7 kg (125 lb)   SpO2 94%   BMI 24.41 kg/m      PHYSICAL EXAM    Physical Exam  Vitals and nursing note reviewed.   Constitutional:        General: She is not in acute distress.     Appearance: Normal appearance. She is normal weight. She is not toxic-appearing or diaphoretic.   HENT:      Right Ear: External ear normal.      Left Ear: External ear normal.   Eyes:      Conjunctiva/sclera: Conjunctivae normal.   Cardiovascular:      Rate and Rhythm: Normal rate and regular rhythm.   Pulmonary:      Effort: Pulmonary effort is normal. No tachypnea, bradypnea or respiratory distress.      Breath sounds: No stridor. Examination of the right-middle field reveals wheezing. Examination of the left-middle field reveals wheezing. Examination of the right-lower field reveals wheezing. Examination of the left-lower field reveals wheezing. Wheezing present.   Chest:      Chest wall: No tenderness.   Abdominal:      Palpations: There is no mass.      Tenderness: There is no guarding or rebound.   Musculoskeletal:      Right lower leg: No edema.      Left lower leg: No edema.   Neurological:      Mental Status: She is alert. Mental status is at baseline.           LAB:  All pertinent labs reviewed and interpreted.  Labs Ordered and Resulted from Time of ED Arrival to Time of ED Departure   INFLUENZA A/B, RSV, & SARS-COV2 PCR - Abnormal       Result Value    Influenza A PCR Negative      Influenza B PCR Negative      RSV PCR Negative      SARS CoV2 PCR Positive (*)        RADIOLOGY:  Reviewed all pertinent imaging. Please see official radiology report.  Chest XR,  PA & LAT   Final Result   IMPRESSION:       Left coronary stents. Cardiac silhouette is normal in size. There are atheromatous calcifications in the proximal great vessels. No findings to suggest aortic enlargement.      Symmetric lung inflation. Benign calcified granuloma in the right midlung laterally. The lower lobes are slightly underinflated. No interstitial or alveolar opacities. Diaphragm curvature is normal. No pleural effusion.                PROCEDURES:   None.     Jc Peralta  DELFINO  Emergency Medicine  [facility] EMERGENCY DEPARTMENT  [facility] [facility] [id number] [phone]  Dept: [phone]       Jc Peralta PA-C  09/13/24 0949       Jc Peralta PA-C  09/24/24 1143

## 2024-09-25 ENCOUNTER — TELEPHONE (OUTPATIENT)
Dept: PULMONOLOGY | Facility: CLINIC | Age: 54
End: 2024-09-25

## 2024-09-25 ENCOUNTER — OFFICE VISIT (OUTPATIENT)
Dept: PULMONOLOGY | Facility: CLINIC | Age: 54
End: 2024-09-25
Payer: COMMERCIAL

## 2024-09-25 VITALS
SYSTOLIC BLOOD PRESSURE: 124 MMHG | OXYGEN SATURATION: 97 % | DIASTOLIC BLOOD PRESSURE: 60 MMHG | BODY MASS INDEX: 24.8 KG/M2 | HEART RATE: 100 BPM | WEIGHT: 127 LBS

## 2024-09-25 DIAGNOSIS — J45.51 SEVERE PERSISTENT ASTHMA WITH EXACERBATION (H): Primary | ICD-10-CM

## 2024-09-25 DIAGNOSIS — U07.1 INFECTION DUE TO 2019 NOVEL CORONAVIRUS: ICD-10-CM

## 2024-09-25 PROCEDURE — 99214 OFFICE O/P EST MOD 30 MIN: CPT | Performed by: NURSE PRACTITIONER

## 2024-09-25 RX ORDER — PREDNISONE 10 MG/1
TABLET ORAL
Qty: 40 TABLET | Refills: 0 | Status: ON HOLD | OUTPATIENT
Start: 2024-09-25 | End: 2024-10-01

## 2024-09-25 RX ORDER — IPRATROPIUM BROMIDE AND ALBUTEROL SULFATE 2.5; .5 MG/3ML; MG/3ML
1 SOLUTION RESPIRATORY (INHALATION) EVERY 6 HOURS PRN
Qty: 180 ML | Refills: 3 | Status: SHIPPED | OUTPATIENT
Start: 2024-09-25

## 2024-09-25 NOTE — PROGRESS NOTES
Pulmonary Outpatient Clinic Follow Up  September 25, 2024      Assessment/Plan:    Ms. Amin is a 54 year old female with a past medical history significant for anxiety, arthritis, questionable asthma versus COPD, diabetes, hypertension and a prior history of SVT presents with her daughter in law for an acute visit.    History of organic fuel exposure in the home was previously had nondiagnostic PFTs.  She has been treated for eosinophilic asthma for the many years with a combination of monoclonal antibodies, inhaled bronchodilators and multiple courses of steroids.     Today reports ongoing exacerbation symptoms following recent COVID-19 infection. Evaluated in the ED on 9/13, see below. Continues to have frequent cough, wheeze, and increased shortness of breath. Lung exam demonstrates diffuse wheeze and rhonchi.  SpO2 is normal at 97%    #. Severe persistent asthma with exacerbation: Currently poorly controlled, recovering from COVID. Continues to have severe symptoms and has had repeated cardiac workup for the symptoms as well. She responds intermittently to steroids but continues to require short acting bronchodilators 3-4 times a day and has not responded well to monoclonal antibodies. Was referred for asthma consult for Dr. Elsie Francois at [facility] for second opinion.   Repeat prednisone taper.   Continue Breo Ellipta 1 puff daily.  She knows to gargle after using this.   Continue Spiriva.  Continue to use 3 times daily albuterol nebs or Duonebs 2-4 times daily prn. Suggested Duonebs use while recovering from COVID, has a hard time with inhalers at this time.   Low threshold to obtain new chest imaging given frequency of vomiting from GI symptoms and recent URI.  Continue Singulair daily.  As needed albuterol rescue.  Sleep with HOB elevated to more than 30 degrees. Needed due to her obstructive lung disease which is triggered when she is lower than this.  #. Questionable eosinophilic esophagitis:  Has significant epigastric abdominal pain likely secondary to gastritis from chronic steroid use vs eosinophilic esophagitis.  Follows with GI for this  Plans for EGD in November   Omeprazole 40 mg a day.  As needed Tums.  Magic mouthwash prn.    Follow-up: in December with Dr. Duong as planned. Asked that they update us in 2 weeks once prednisone taper is complete.     Jenn Mike, CNP  Pulmonary Medicine  [facility]   [phone]    Subjective:   She is in this clinic by Dr. Duong.  Last seen on 7/10.  Since that time she has had an ED visit on 9/13 for increased SOB. Found to be COVID positive. CXR was clear. Given Paxlovid and prednisone taper.   Has completed taper and and, per daughter-in-law, it was not helpful.  They have been using albuterol nebs daily.  Consistently using Breo and Spiriva although use is difficult currently due to frequent cough.  Has been following with GI for eosinophilic esophagitis.  They are planning on a EGD in the next few months to investigate cause of stomach and epigastric pain with intake and frequent vomiting.    Followed by Dr. Marie in allergy and it is noted that she has previously been on Fasenra which alleviated her symptoms of asthma but caused her to have significant itching.  She was trialed on Dupixent with minimal efficacy and has not been in test by which has not been effective either.          3/8/2024     9:10 AM 3/28/2024    10:35 AM 4/25/2024    11:00 AM   ACT Total Scores   ACT TOTAL SCORE (Goal Greater than or Equal to 20) 8 6 6   In the past 12 months, how many times did you visit the emergency room for your asthma without being admitted to the hospital? 0 1 1   In the past 12 months, how many times were you hospitalized overnight because of your asthma? 0 1 1       Current Outpatient Medications   Medication Sig Dispense Refill    acetaminophen (TYLENOL) 500 MG tablet TAKE 1 PILL BY MOUTH EVERY 6 HOURS AS NEEDED FOR PAIN 100 tablet 10     acetaminophen-codeine (TYLENOL #3) 300-30 MG per tablet Take 1 tablet by mouth every 6 hours as needed for severe pain 30 tablet 0    albuterol (PROAIR HFA/PROVENTIL HFA/VENTOLIN HFA) 108 (90 Base) MCG/ACT inhaler Inhale 2 puffs into the lungs every 4 hours as needed for shortness of breath 18 g 11    albuterol (PROVENTIL) (2.5 MG/3ML) 0.083% neb solution Take 1 vial (2.5 mg) by nebulization every 6 hours as needed for shortness of breath, wheezing or cough 360 mL 11    amitriptyline (ELAVIL) 50 MG tablet Take 1 tablet (50 mg) by mouth at bedtime 90 tablet 1    aspirin 81 MG EC tablet Take 81 mg by mouth daily.      atorvastatin (LIPITOR) 80 MG tablet Take 1 tablet (80 mg) by mouth daily 90 tablet 3    calcium carbonate (TUMS) 500 MG chewable tablet Take 1 chew tab by mouth 2 times daily as needed for heartburn.      colchicine (COLCRYS) 0.6 MG tablet Take 1 tablet (0.6 mg) by mouth daily 90 tablet 3    Continuous Blood Gluc Sensor (FREESTYLE MONICA 2 SENSOR) [facility] 1 EACH EVERY 14 DAYS USE 1 SENSOR EVERY 14 DAYS. USE TO READ BLOOD SUGARS PER 'S INSTRUCTIONS. 2 each 11    diclofenac (VOLTAREN) 1 % topical gel Apply 4 g topically 4 times daily 350 g 3    FEROSUL 325 (65 Fe) MG tablet TAKE 1 TABLET (325 MG) BY MOUTH DAILY (WITH BREAKFAST) 30 tablet 0    fluticasone-vilanterol (BREO ELLIPTA) 200-25 MCG/ACT inhaler Inhale 1 puff into the lungs daily 60 each 11    gabapentin (NEURONTIN) 600 MG tablet TAKE 1 PILL (600 MG) BY MOUTH 3 TIMES DAILY 90 tablet 3    hydrochlorothiazide (MICROZIDE) 12.5 MG capsule Take 1 capsule (12.5 mg) by mouth every morning 90 capsule 3    ibuprofen (ADVIL/MOTRIN) 200 MG tablet Take 2 tablets (400 mg) by mouth every 8 hours as needed for pain. 60 tablet 0    insulin aspart (NOVOLOG PEN) 100 UNIT/ML pen Inject 16 Units subcutaneously 2 times daily (before meals). 45 mL 3    insulin glargine (LANTUS PEN) 100 UNIT/ML pen Inject 40 Units subcutaneously every morning. 45 mL 3    insulin  pen needle (32G X 4 MM) 32G X 4 MM miscellaneous Use 3 pen needles daily or as directed. 300 each 4    Lidocaine (LIDOCARE) 4 % Patch Place 1 patch onto the skin every 24 hours To prevent lidocaine toxicity, patient should be patch free for 12 hrs daily. 30 patch 1    metFORMIN (GLUCOPHAGE XR) 500 MG 24 hr tablet Take 2 tablets (1,000 mg) by mouth 2 times daily (with meals) 360 tablet 3    metoprolol tartrate (LOPRESSOR) 25 MG tablet TAKE 1 TABLET (25 MG TOTAL) BY MOUTH 2 (TWO) TIMES A DAY FOR BLOOD PRESSURE 180 tablet 3    montelukast (SINGULAIR) 10 MG tablet Take 1 tablet (10 mg) by mouth at bedtime 30 tablet 11    NEXIUM 40 MG DR capsule Take 40 mg by mouth every morning (before breakfast).      polyethylene glycol (MIRALAX) 17 GM/Dose powder Take 17 g (1 Capful) by mouth daily 238 g 1    sennosides (SENOKOT) 8.6 MG tablet Take 1 tablet by mouth daily as needed for constipation. 30 tablet 3    SPIRIVA RESPIMAT 2.5 MCG/ACT inhaler INHALE 2 PUFFS INTO THE LUNGS DAILY 4 g 0    sucralfate (CARAFATE) 1 GM/10ML suspension Take 10 mLs (1 g) by mouth 4 times daily. 3600 mL 1    vitamin D3 (CHOLECALCIFEROL) 10 MCG (400 UNIT) capsule Take 1 capsule (400 Units) by mouth daily. 90 capsule 3    predniSONE (DELTASONE) 20 MG tablet Take two tablets (= 40mg) each day for 5 (five) days (Patient not taking: Reported on 9/25/2024) 10 tablet 0    predniSONE (DELTASONE) 20 MG tablet Take 2 tabs daily x 7 days (Patient not taking: Reported on 9/25/2024) 14 tablet 0     Social history:  Lives in a house built in 1963; no concern for mold in the house.  7 People live in the house including 2 children.  There are no pets in the house.  She is a never smoker and there is no second hand exposure to smoke.  She does not drink alcoholic beverages and denies indulging in recreational drugs.    Physical Exam   /60   Pulse 100   Wt 57.6 kg (127 lb)   SpO2 97%   BMI 24.80 kg/m      Physical Exam  Constitutional:       General: She is  not in acute distress.     Appearance: She is not ill-appearing or diaphoretic.   Cardiovascular:      Rate and Rhythm: Normal rate and regular rhythm.      Heart sounds: Normal heart sounds.   Pulmonary:      Effort: Pulmonary effort is normal. No respiratory distress.      Breath sounds: Wheezing and rhonchi (scattered, expiratory) present.   Musculoskeletal:      Right lower leg: No edema.      Left lower leg: No edema.   Neurological:      Mental Status: She is alert.   Psychiatric:         Behavior: Behavior normal.            Latest Reference Range & Units 02/07/22 15:47   Neutrophil Cytoplasmic Antibody <1:10  <1:10   Neutrophil Cytoplasmic Antibody Pattern  The ANCA IFA is <1:10.  No further testing will be performed.      Latest Reference Range & Units 02/07/22 15:47   Schistosoma Ab IgG Negative  Negative [1]   Strongyloides Bere IgG <=0.9 IV 0.4 [2]      Latest Reference Range & Units 02/07/22 15:47   Immunoglobulin E <=214 kU/L 74 [1]   Allergen Fungimold A Fumigatus IgE <=0.34 kU/L <0.10 [2]   Aspergillus Fumagatis 1 Antibody None Detected  None Detected [3]   Aspergillus Fumagatis 6 Antibody None Detected  None Detected [4]   Allergen, Interp, Immunocap Score IgE  See Note [5]     XR CHEST 2 VIEWS, DATE: 9/13/2024  INDICATION: Cough, sob, wheezing  COMPARISON: AP and lateral views of the chest 7/9/2024                                                                 IMPRESSION:   Left coronary stents. Cardiac silhouette is normal in size. There are atheromatous calcifications in the proximal great vessels. No findings to suggest aortic enlargement. Symmetric lung inflation. Benign calcified granuloma in the right midlung laterally. The lower lobes are slightly underinflated. No interstitial or alveolar opacities. Diaphragm curvature is normal. No pleural effusion.    XR CHEST 2 VIEWS, DATE/TIME: 3/27/2023:  INDICATION: chest pain  COMPARISON: 02/05/2023                                                               IMPRESSION: No pneumothorax or pleural effusion. No focal consolidation. Cardiac silhouette within normal limits. Mildly atherosclerotic aorta.    CT CHEST W/O CONTRAST, DATE/TIME: 7/11/2022   1.  No acute abnormality. No cause of chest pain is evident.  2.  A few small lung nodules without significant change.  Recommendations for one or multiple incidental lung nodules < 6mm :   Low risk patients: No routine follow-up.   High risk patients: Optional follow-up CT at 12 months; if unchanged, no further follow-up.    ECHO 5/4/2022  Interpretation Summary  Left ventricular size, wall motion and function are normal. The ejection  fraction is 55-60%.  There is borderline anterior, septal, and apical wall hypokinesis.  Normal right ventricle size and systolic function.  No hemodynamically significant valvular abnormalities on 2D or color flow imaging.

## 2024-09-25 NOTE — TELEPHONE ENCOUNTER
Called and spoke with daughter in law, Billy, in regards to her appointment today. She reports Kulwant is just getting over Covid (9/13/24). She is coughing and wheezing still. Denies fever or phlegm coming up. Offered her the action plan for Kulwant. She declined. Billy wants to come in and have her mother in law be seen by provider. She asked if she was seeing Dr. Duong. Informed Billy she would be seen by Jenn Mike CNP. She is fine with that and will keep their scheduled appt today.

## 2024-09-25 NOTE — PATIENT INSTRUCTIONS
It was a pleasure seeing you in clinic today. This is what we discussed:    Check your oxygen levels at home. If you see a reading less than 88% you need to go to the emergency room.   START the Duonebs (ipratropium/albuterol) 4 times daily. Do these until your symptoms improve.   I refilled the prednisone taper. If you are not starting to feel better after this call the nurse line.   Use your albuterol every 4 hours as needed for cough, shortness of breath, wheeze, or chest tightness.   Follow up with Dr. Duong in 2 months   If you have worsening symptoms, questions, or need to speak with the nurse please call [phone].

## 2024-09-26 ENCOUNTER — APPOINTMENT (OUTPATIENT)
Dept: RADIOLOGY | Facility: HOSPITAL | Age: 54
DRG: 203 | End: 2024-09-26
Payer: COMMERCIAL

## 2024-09-26 ENCOUNTER — HOSPITAL ENCOUNTER (INPATIENT)
Facility: HOSPITAL | Age: 54
LOS: 5 days | Discharge: HOME | DRG: 203 | End: 2024-10-01
Attending: EMERGENCY MEDICINE | Admitting: INTERNAL MEDICINE
Payer: COMMERCIAL

## 2024-09-26 ENCOUNTER — APPOINTMENT (OUTPATIENT)
Dept: CT IMAGING | Facility: HOSPITAL | Age: 54
DRG: 203 | End: 2024-09-26
Payer: COMMERCIAL

## 2024-09-26 DIAGNOSIS — J44.1 CHRONIC OBSTRUCTIVE PULMONARY DISEASE WITH ACUTE EXACERBATION (H): Primary | Chronic | ICD-10-CM

## 2024-09-26 DIAGNOSIS — J45.51 SEVERE PERSISTENT ASTHMA WITH EXACERBATION (H): ICD-10-CM

## 2024-09-26 DIAGNOSIS — Z53.9 DIAGNOSIS NOT YET DEFINED: Primary | ICD-10-CM

## 2024-09-26 DIAGNOSIS — R06.02 SHORTNESS OF BREATH: ICD-10-CM

## 2024-09-26 LAB
ALBUMIN SERPL BCG-MCNC: 4.1 G/DL (ref 3.5–5.2)
ALP SERPL-CCNC: 120 U/L (ref 40–150)
ALT SERPL W P-5'-P-CCNC: 45 U/L (ref 0–50)
ANION GAP SERPL CALCULATED.3IONS-SCNC: 13 MMOL/L (ref 7–15)
AST SERPL W P-5'-P-CCNC: 50 U/L (ref 0–45)
BASOPHILS # BLD AUTO: 0 10E3/UL (ref 0–0.2)
BASOPHILS NFR BLD AUTO: 0 %
BILIRUB SERPL-MCNC: 0.4 MG/DL
BUN SERPL-MCNC: 8.8 MG/DL (ref 6–20)
CALCIUM SERPL-MCNC: 9 MG/DL (ref 8.8–10.4)
CHLORIDE SERPL-SCNC: 99 MMOL/L (ref 98–107)
CREAT SERPL-MCNC: 0.49 MG/DL (ref 0.51–0.95)
EGFRCR SERPLBLD CKD-EPI 2021: >90 ML/MIN/1.73M2
EOSINOPHIL # BLD AUTO: 0 10E3/UL (ref 0–0.7)
EOSINOPHIL NFR BLD AUTO: 0 %
ERYTHROCYTE [DISTWIDTH] IN BLOOD BY AUTOMATED COUNT: 14.5 % (ref 10–15)
EST. AVERAGE GLUCOSE BLD GHB EST-MCNC: 163 MG/DL
GLUCOSE BLDC GLUCOMTR-MCNC: 241 MG/DL (ref 70–99)
GLUCOSE BLDC GLUCOMTR-MCNC: 409 MG/DL (ref 70–99)
GLUCOSE SERPL-MCNC: 262 MG/DL (ref 70–99)
HBA1C MFR BLD: 7.3 %
HCO3 SERPL-SCNC: 23 MMOL/L (ref 22–29)
HCT VFR BLD AUTO: 39.4 % (ref 35–47)
HGB BLD-MCNC: 12.6 G/DL (ref 11.7–15.7)
HOLD SPECIMEN: NORMAL
IMM GRANULOCYTES # BLD: 0.1 10E3/UL
IMM GRANULOCYTES NFR BLD: 1 %
LYMPHOCYTES # BLD AUTO: 1.7 10E3/UL (ref 0.8–5.3)
LYMPHOCYTES NFR BLD AUTO: 18 %
MCH RBC QN AUTO: 27.2 PG (ref 26.5–33)
MCHC RBC AUTO-ENTMCNC: 32 G/DL (ref 31.5–36.5)
MCV RBC AUTO: 85 FL (ref 78–100)
MONOCYTES # BLD AUTO: 0.4 10E3/UL (ref 0–1.3)
MONOCYTES NFR BLD AUTO: 4 %
NEUTROPHILS # BLD AUTO: 7.3 10E3/UL (ref 1.6–8.3)
NEUTROPHILS NFR BLD AUTO: 77 %
NRBC # BLD AUTO: 0 10E3/UL
NRBC BLD AUTO-RTO: 0 /100
NT-PROBNP SERPL-MCNC: 65 PG/ML (ref 0–900)
PLATELET # BLD AUTO: 260 10E3/UL (ref 150–450)
POTASSIUM SERPL-SCNC: 4.3 MMOL/L (ref 3.4–5.3)
PROT SERPL-MCNC: 7.1 G/DL (ref 6.4–8.3)
RBC # BLD AUTO: 4.63 10E6/UL (ref 3.8–5.2)
SODIUM SERPL-SCNC: 135 MMOL/L (ref 135–145)
WBC # BLD AUTO: 9.6 10E3/UL (ref 4–11)

## 2024-09-26 PROCEDURE — 36415 COLL VENOUS BLD VENIPUNCTURE: CPT | Performed by: EMERGENCY MEDICINE

## 2024-09-26 PROCEDURE — 250N000013 HC RX MED GY IP 250 OP 250 PS 637: Performed by: INTERNAL MEDICINE

## 2024-09-26 PROCEDURE — 99285 EMERGENCY DEPT VISIT HI MDM: CPT | Mod: 25

## 2024-09-26 PROCEDURE — 94640 AIRWAY INHALATION TREATMENT: CPT

## 2024-09-26 PROCEDURE — 71250 CT THORAX DX C-: CPT

## 2024-09-26 PROCEDURE — 999N000157 HC STATISTIC RCP TIME EA 10 MIN

## 2024-09-26 PROCEDURE — 250N000012 HC RX MED GY IP 250 OP 636 PS 637: Performed by: INTERNAL MEDICINE

## 2024-09-26 PROCEDURE — G0179 MD RECERTIFICATION HHA PT: HCPCS | Performed by: FAMILY MEDICINE

## 2024-09-26 PROCEDURE — 83036 HEMOGLOBIN GLYCOSYLATED A1C: CPT | Performed by: INTERNAL MEDICINE

## 2024-09-26 PROCEDURE — 120N000001 HC R&B MED SURG/OB

## 2024-09-26 PROCEDURE — 250N000011 HC RX IP 250 OP 636: Performed by: INTERNAL MEDICINE

## 2024-09-26 PROCEDURE — 85025 COMPLETE CBC W/AUTO DIFF WBC: CPT

## 2024-09-26 PROCEDURE — 250N000009 HC RX 250

## 2024-09-26 PROCEDURE — 99223 1ST HOSP IP/OBS HIGH 75: CPT | Performed by: INTERNAL MEDICINE

## 2024-09-26 PROCEDURE — 71046 X-RAY EXAM CHEST 2 VIEWS: CPT

## 2024-09-26 PROCEDURE — 93005 ELECTROCARDIOGRAM TRACING: CPT | Performed by: EMERGENCY MEDICINE

## 2024-09-26 PROCEDURE — 83880 ASSAY OF NATRIURETIC PEPTIDE: CPT

## 2024-09-26 PROCEDURE — 250N000009 HC RX 250: Performed by: INTERNAL MEDICINE

## 2024-09-26 PROCEDURE — 80053 COMPREHEN METABOLIC PANEL: CPT

## 2024-09-26 RX ORDER — DOXYCYCLINE 100 MG/1
100 CAPSULE ORAL 2 TIMES DAILY
Status: DISCONTINUED | OUTPATIENT
Start: 2024-09-26 | End: 2024-10-01 | Stop reason: HOSPADM

## 2024-09-26 RX ORDER — ONDANSETRON 2 MG/ML
4 INJECTION INTRAMUSCULAR; INTRAVENOUS EVERY 6 HOURS PRN
Status: DISCONTINUED | OUTPATIENT
Start: 2024-09-26 | End: 2024-10-01 | Stop reason: HOSPADM

## 2024-09-26 RX ORDER — AMOXICILLIN 250 MG
1 CAPSULE ORAL 2 TIMES DAILY PRN
Status: DISCONTINUED | OUTPATIENT
Start: 2024-09-26 | End: 2024-10-01 | Stop reason: HOSPADM

## 2024-09-26 RX ORDER — CALCIUM CARBONATE 500 MG/1
1000 TABLET, CHEWABLE ORAL 4 TIMES DAILY PRN
Status: DISCONTINUED | OUTPATIENT
Start: 2024-09-26 | End: 2024-10-01 | Stop reason: HOSPADM

## 2024-09-26 RX ORDER — METHYLPREDNISOLONE SODIUM SUCCINATE 40 MG/ML
40 INJECTION, POWDER, LYOPHILIZED, FOR SOLUTION INTRAMUSCULAR; INTRAVENOUS DAILY
Status: DISCONTINUED | OUTPATIENT
Start: 2024-09-26 | End: 2024-09-28

## 2024-09-26 RX ORDER — DEXTROSE MONOHYDRATE 25 G/50ML
25-50 INJECTION, SOLUTION INTRAVENOUS
Status: DISCONTINUED | OUTPATIENT
Start: 2024-09-26 | End: 2024-10-01 | Stop reason: HOSPADM

## 2024-09-26 RX ORDER — AMOXICILLIN 250 MG
2 CAPSULE ORAL 2 TIMES DAILY PRN
Status: DISCONTINUED | OUTPATIENT
Start: 2024-09-26 | End: 2024-10-01 | Stop reason: HOSPADM

## 2024-09-26 RX ORDER — IPRATROPIUM BROMIDE AND ALBUTEROL SULFATE 2.5; .5 MG/3ML; MG/3ML
3 SOLUTION RESPIRATORY (INHALATION)
Status: DISCONTINUED | OUTPATIENT
Start: 2024-09-26 | End: 2024-09-29

## 2024-09-26 RX ORDER — ENOXAPARIN SODIUM 100 MG/ML
40 INJECTION SUBCUTANEOUS EVERY 24 HOURS
Status: DISCONTINUED | OUTPATIENT
Start: 2024-09-26 | End: 2024-10-01 | Stop reason: HOSPADM

## 2024-09-26 RX ORDER — NICOTINE POLACRILEX 4 MG
15-30 LOZENGE BUCCAL
Status: DISCONTINUED | OUTPATIENT
Start: 2024-09-26 | End: 2024-10-01 | Stop reason: HOSPADM

## 2024-09-26 RX ORDER — ACETAMINOPHEN 325 MG/1
650 TABLET ORAL EVERY 4 HOURS PRN
Status: DISCONTINUED | OUTPATIENT
Start: 2024-09-26 | End: 2024-10-01 | Stop reason: HOSPADM

## 2024-09-26 RX ORDER — LIDOCAINE 40 MG/G
CREAM TOPICAL
Status: DISCONTINUED | OUTPATIENT
Start: 2024-09-26 | End: 2024-10-01 | Stop reason: HOSPADM

## 2024-09-26 RX ORDER — ALBUTEROL SULFATE 0.83 MG/ML
2.5 SOLUTION RESPIRATORY (INHALATION)
Status: DISCONTINUED | OUTPATIENT
Start: 2024-09-26 | End: 2024-10-01 | Stop reason: HOSPADM

## 2024-09-26 RX ORDER — IPRATROPIUM BROMIDE AND ALBUTEROL SULFATE 2.5; .5 MG/3ML; MG/3ML
3 SOLUTION RESPIRATORY (INHALATION) ONCE
Status: COMPLETED | OUTPATIENT
Start: 2024-09-26 | End: 2024-09-26

## 2024-09-26 RX ORDER — ONDANSETRON 4 MG/1
4 TABLET, ORALLY DISINTEGRATING ORAL EVERY 6 HOURS PRN
Status: DISCONTINUED | OUTPATIENT
Start: 2024-09-26 | End: 2024-10-01 | Stop reason: HOSPADM

## 2024-09-26 RX ORDER — GUAIFENESIN 600 MG/1
1200 TABLET, EXTENDED RELEASE ORAL 2 TIMES DAILY
Status: DISCONTINUED | OUTPATIENT
Start: 2024-09-26 | End: 2024-10-01 | Stop reason: HOSPADM

## 2024-09-26 RX ORDER — ACETAMINOPHEN 650 MG/1
650 SUPPOSITORY RECTAL EVERY 4 HOURS PRN
Status: DISCONTINUED | OUTPATIENT
Start: 2024-09-26 | End: 2024-10-01 | Stop reason: HOSPADM

## 2024-09-26 RX ADMIN — IPRATROPIUM BROMIDE AND ALBUTEROL SULFATE 3 ML: .5; 3 SOLUTION RESPIRATORY (INHALATION) at 12:41

## 2024-09-26 RX ADMIN — IPRATROPIUM BROMIDE AND ALBUTEROL SULFATE 3 ML: .5; 3 SOLUTION RESPIRATORY (INHALATION) at 21:15

## 2024-09-26 RX ADMIN — ENOXAPARIN SODIUM 40 MG: 40 INJECTION SUBCUTANEOUS at 17:13

## 2024-09-26 RX ADMIN — GUAIFENESIN 1200 MG: 600 TABLET ORAL at 16:31

## 2024-09-26 RX ADMIN — DOXYCYCLINE HYCLATE 100 MG: 100 CAPSULE ORAL at 16:32

## 2024-09-26 RX ADMIN — DOXYCYCLINE HYCLATE 100 MG: 100 CAPSULE ORAL at 21:35

## 2024-09-26 RX ADMIN — ACETAMINOPHEN 650 MG: 325 TABLET ORAL at 16:25

## 2024-09-26 RX ADMIN — INSULIN ASPART 3 UNITS: 100 INJECTION, SOLUTION INTRAVENOUS; SUBCUTANEOUS at 17:12

## 2024-09-26 RX ADMIN — METHYLPREDNISOLONE SODIUM SUCCINATE 40 MG: 40 INJECTION, POWDER, FOR SOLUTION INTRAMUSCULAR; INTRAVENOUS at 16:32

## 2024-09-26 RX ADMIN — IPRATROPIUM BROMIDE AND ALBUTEROL SULFATE 3 ML: .5; 3 SOLUTION RESPIRATORY (INHALATION) at 08:30

## 2024-09-26 RX ADMIN — GUAIFENESIN 1200 MG: 600 TABLET ORAL at 21:35

## 2024-09-26 RX ADMIN — INSULIN GLARGINE 20 UNITS: 100 INJECTION, SOLUTION SUBCUTANEOUS at 21:36

## 2024-09-26 ASSESSMENT — ACTIVITIES OF DAILY LIVING (ADL)
ADLS_ACUITY_SCORE: 38

## 2024-09-26 NOTE — H&P
[facility]    History and Physical - Hospitalist Service       Date of Admission:  9/26/2024    Assessment & Plan      Kulwant Amin is a 54 year old female admitted on 9/26/2024. She has COPD, asthma, recent COVID infection, latent TB. Recent Prednisone and Paxlovid for COVID. Came in for worse cough and sob.    Acute on chronic resp distress  Acute COPD exacerbation  -Dyspnea and cough worsening  -wheezing on exam  -chest ct: no acute inflammatory changes;+mucus and nodules  -start on Solumedrol, nebs and Doxycycline    DM 2 uncontrolled  -hgb A1c 7.3  -PTA: Lantus 40 unit(s) QAM, Novolog 16 units BID  -lantus 20 units BID and sliding scale insulin; premeal 1:10 Novolog  -hold Metformin    Recent COVID  -diagnosed 2 weeks ago  -treated with Paxlovid  -off isolation    H/o latent TB, not active now  Anxiety  Depression  CAD, s/p stents, no cp  HTN  GERD  Anemia, microcytic   Gout  --resume PTA meds after pharmacy review        Diet: Combination Diet Moderate Consistent Carb (60 g CHO per Meal) Diet    DVT Prophylaxis: Enoxaparin (Lovenox) SQ  Collins Catheter: Not present  Lines: None     Cardiac Monitoring: None  Code Status: Full Code      Clinically Significant Risk Factors Present on Admission                # Drug Induced Platelet Defect: home medication list includes an antiplatelet medication   # Hypertension: Noted on problem list        # DMII: A1C = 7.3 % (Ref range: <5.7 %) within past 6 months        # COPD: noted on problem list        Disposition Plan     Medically Ready for Discharge: Anticipated in 2-4 Days           Trinh Gutierrez MD  Hospitalist Service  [facility]  Securely message with [employer] (more info)  Text page via [employer] Paging/Directory     ______________________________________________________________________    Chief Complaint   Cough and sob    History is obtained from the patient and emergency department physician    History of Present  Illness   Kulwant Amin is a 54 year old female with a pertinent history of COPD, recent COVID infection, latent TB who presents to the ED by self with family member for evaluation of cough and shortness of breath.     Patient was seen on 9/13 here for similar symptoms, diagnosed with COVID and was discharged with prednisone and paxlovid. Symptoms did not improve with these medications per family member. Patient was seen again yesterday by her pulmonology clinic and restarted with the prednisone taper along with duonebs. Instructed to continue her at home medications including albuterol PRN, singulair, breo, spiriva.     Feels more short of breath and worsening cough today which is why she returns to the ED. Her last nebulizer treatment was 1 hour prior to arrival with minimal improvement. Patient states usually when she does her nebulizers she has very short term improvement before worsening again.       Past Medical History    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    Microcytic anemia 11/17/2019    S/P coronary artery stent placement 02/02/2018    TB lung, latent     9 mos INH       Past Surgical History   Past Surgical History:   Procedure Laterality Date    CORONARY STENT PLACEMENT  2018    CV CORONARY ANGIOGRAM N/A 1/25/2018    Procedure: Coronary Angiogram;  Surgeon: Angelo Serrano MD;  Location: [facility];  Service:     CV CORONARY ANGIOGRAM N/A 5/5/2022    Procedure: Coronary  Angiogram;  Surgeon: Irwin Cano MD;  Location: [facility] CV    CV CORONARY ANGIOGRAM  2022    Procedure: ;  Surgeon: Irwin Cano MD;  Location: [facility] CV    MO ESOPHAGOGASTRODUODENOSCOPY TRANSORAL DIAGNOSTIC N/A 12/10/2018    Procedure: ESOPHAGOGASTRODUODENOSCOPY (EGD);  Surgeon: Eddie Renteria MD;  Location: [facility];  Service: Gastroenterology    MO ESOPHAGOGASTRODUODENOSCOPY TRANSORAL DIAGNOSTIC N/A 12/3/2020    Procedure: ESOPHAGOGASTRODUODENOSCOPY (EGD) with biospies ;  Surgeon: Avi Crow MD;  Location: [facility];  Service: Gastroenterology    [facility] COLONOSCOPY W/WO BRUSH/WASH N/A 12/10/2018    Procedure: COLONOSCOPY with polypectomy using biopsy forceps;  Surgeon: Eddie Renteria MD;  Location: [facility];  Service: Gastroenterology       Prior to Admission Medications   Prior to Admission Medications   Prescriptions Last Dose Informant Patient Reported? Taking?   Continuous Blood Gluc Sensor (FREESTYLE MONICA 2 SENSOR) [facility] [place]   No No   Si EACH EVERY 14 DAYS USE 1 SENSOR EVERY 14 DAYS. USE TO READ BLOOD SUGARS PER 'S INSTRUCTIONS.   FEROSUL 325 (65 Fe) MG tablet   No No   Sig: TAKE 1 TABLET (325 MG) BY MOUTH DAILY (WITH BREAKFAST)   Lidocaine (LIDOCARE) 4 % Patch   No No   Sig: Place 1 patch onto the skin every 24 hours To prevent lidocaine toxicity, patient should be patch free for 12 hrs daily.   NEXIUM 40 MG DR capsule   Yes No   Sig: Take 40 mg by mouth every morning (before breakfast).   SPIRIVA RESPIMAT 2.5 MCG/ACT inhaler   No No   Sig: INHALE 2 PUFFS INTO THE LUNGS DAILY   acetaminophen (TYLENOL) 500 MG tablet  Daughter No No   Sig: TAKE 1 PILL BY MOUTH EVERY 6 HOURS AS NEEDED FOR PAIN   acetaminophen-codeine (TYLENOL #3) 300-30 MG per tablet   No No   Sig: Take 1 tablet by mouth every 6 hours as needed for severe pain   albuterol (PROAIR HFA/PROVENTIL HFA/VENTOLIN HFA) 108 (90 Base) MCG/ACT inhaler   No No   Sig: Inhale 2 puffs into  the lungs every 4 hours as needed for shortness of breath   albuterol (PROVENTIL) (2.5 MG/3ML) 0.083% neb solution   No No   Sig: Take 1 vial (2.5 mg) by nebulization every 6 hours as needed for shortness of breath, wheezing or cough   amitriptyline (ELAVIL) 50 MG tablet   No No   Sig: Take 1 tablet (50 mg) by mouth at bedtime   aspirin 81 MG EC tablet   Yes No   Sig: Take 81 mg by mouth daily.   atorvastatin (LIPITOR) 80 MG tablet   No No   Sig: Take 1 tablet (80 mg) by mouth daily   calcium carbonate (TUMS) 500 MG chewable tablet   Yes No   Sig: Take 1 chew tab by mouth 2 times daily as needed for heartburn.   colchicine (COLCRYS) 0.6 MG tablet   No No   Sig: Take 1 tablet (0.6 mg) by mouth daily   diclofenac (VOLTAREN) 1 % topical gel   No No   Sig: Apply 4 g topically 4 times daily   fluticasone-vilanterol (BREO ELLIPTA) 200-25 MCG/ACT inhaler   No No   Sig: Inhale 1 puff into the lungs daily   gabapentin (NEURONTIN) 600 MG tablet   No No   Sig: TAKE 1 PILL (600 MG) BY MOUTH 3 TIMES DAILY   hydrochlorothiazide (MICROZIDE) 12.5 MG capsule   No No   Sig: Take 1 capsule (12.5 mg) by mouth every morning   ibuprofen (ADVIL/MOTRIN) 200 MG tablet   No No   Sig: Take 2 tablets (400 mg) by mouth every 8 hours as needed for pain.   insulin aspart (NOVOLOG PEN) 100 UNIT/ML pen   No No   Sig: Inject 16 Units subcutaneously 2 times daily (before meals).   insulin glargine (LANTUS PEN) 100 UNIT/ML pen   No No   Sig: Inject 40 Units subcutaneously every morning.   insulin pen needle (32G X 4 MM) 32G X 4 MM miscellaneous   No No   Sig: Use 3 pen needles daily or as directed.   ipratropium - albuterol 0.5 mg/2.5 mg/3 mL (DUONEB) 0.5-2.5 (3) MG/3ML neb solution   No No   Sig: Take 1 vial (3 mLs) by nebulization every 6 hours as needed for shortness of breath, wheezing or cough.   metFORMIN (GLUCOPHAGE XR) 500 MG 24 hr tablet   No No   Sig: Take 2 tablets (1,000 mg) by mouth 2 times daily (with meals)   metoprolol tartrate  (LOPRESSOR) 25 MG tablet   No No   Sig: TAKE 1 TABLET (25 MG TOTAL) BY MOUTH 2 (TWO) TIMES A DAY FOR BLOOD PRESSURE   montelukast (SINGULAIR) 10 MG tablet   No No   Sig: Take 1 tablet (10 mg) by mouth at bedtime   polyethylene glycol (MIRALAX) 17 GM/Dose powder   No No   Sig: Take 17 g (1 Capful) by mouth daily   predniSONE (DELTASONE) 10 MG tablet   No No   Sig: Take 4 tablets (40 mg) by mouth daily for 4 days, THEN 3 tablets (30 mg) daily for 4 days, THEN 2 tablets (20 mg) daily for 4 days, THEN 1 tablet (10 mg) daily for 4 days.   sennosides (SENOKOT) 8.6 MG tablet   No No   Sig: Take 1 tablet by mouth daily as needed for constipation.   sucralfate (CARAFATE) 1 GM/10ML suspension   No No   Sig: Take 10 mLs (1 g) by mouth 4 times daily.   vitamin D3 (CHOLECALCIFEROL) 10 MCG (400 UNIT) capsule   No No   Sig: Take 1 capsule (400 Units) by mouth daily.      Facility-Administered Medications Last Administration Doses Remaining   tezepelumab-ekko (TEZSPIRE) injection 210 mg 3/28/2024 10:39 AM            Allergies   No Known Allergies     Physical Exam   Vital Signs: Temp: 97.9  F (36.6  C) Temp src: Oral BP: 116/71 Pulse: 91   Resp: 25 SpO2: 97 % O2 Device: None (Room air)    Weight: 127 lbs 0 oz    General.  Awake alert oriented in resp distress.  HEENT.  Pupils equal round react to light, anicteric, EOM intact.  Neck supple no JVD.  CVS regular rhythm no murmur gallops.  Lungs.  Wheezing and coarse bs to auscultation bilateral   Abdomen.  Soft nontender bowel sounds present.  Extremities.  No edema no calf tenderness.  Neurological.  Awake and alert. No focal deficit.  Skin no rash. No pallor.  Psych. Normal mood.      Medical Decision Making       76 MINUTES SPENT BY ME on the date of service doing chart review, history, exam, documentation & further activities per the note.      Data     I have personally reviewed the following data over the past 24 hrs:    9.6  \   12.6   / 260     135 99 8.8 /  262 (H)   4.3 23  0.49 (L) \     ALT: 45 AST: 50 (H) AP: 120 TBILI: 0.4   ALB: 4.1 TOT PROTEIN: 7.1 LIPASE: N/A     Trop: N/A BNP: 65     TSH: N/A T4: N/A A1C: 7.3 (H)       Imaging results reviewed over the past 24 hrs:   Recent Results (from the past 24 hour(s))   Chest XR,  PA & LAT    Narrative    EXAM: XR CHEST 2 VIEWS  LOCATION: [facility]  DATE: 9/26/2024    INDICATION: wheeze, cough, covid history  COMPARISON: 9/13/2024      Impression    IMPRESSION: Mild bibasilar atelectasis/scar. Small right pleural effusion. Stable cardiac silhouette at the upper limits of normal for size. Calcified granuloma. Normal pulmonary vascularity.   Chest CT w/o contrast    Narrative    EXAM: CT CHEST W/O CONTRAST  LOCATION: [facility]  DATE: 9/26/2024    INDICATION: shortness of breath, wheezing  COMPARISON: CT pulmonary angiogram 6/15/2024, 7/24/2023  TECHNIQUE: CT chest without IV contrast. Multiplanar reformats were obtained. Dose reduction techniques were used.  CONTRAST: None.    FINDINGS:   LUNGS AND PLEURA: Symmetric lung inflation. Minimal subpleural scarring in the bases in the posterior sulci and along the right lateral costal pleura towards the lateral sulcus. There is a solid peribronchial vascular nodule in the right lower lobe   superior segment measuring 4-5 mm (series 4, image 100). Smaller, 3 mm nodule with internal calcification along the medial right major fissure (series 4, image 114) is also stable. There is a ovoid calcification along the posterolateral costal pleura of   the right lower lobe, definitively benign. Central airways are patent. There are are visible secretions within multiple branch subsegmental airways in the basilar segments of both lower lobes. No postobstructive phenomenon. No pleural space abnormality.    MEDIASTINUM: Cardiac chambers are normal in size. No pericardial effusion. Nonaneurysmal thoracic aorta. 2 vessel arch anatomy. Mild to moderate  calcified plaque is present in the proximal great vessels, aortic arch and descending thoracic aorta.   Esophagus is decompressed. No lymphadenopathy.    CORONARY ARTERY CALCIFICATION: Previous intervention (stents or CABG).    UPPER ABDOMEN: No actionable findings in the imaged upper abdomen.    MUSCULOSKELETAL: Thoracic vertebra are maintained in height and shape. No fractures or bone lesions.      Impression    IMPRESSION:     1.  No acute lung inflammatory process.  2.  Retained mucus within multiple subsegmental branch airways in the lower lobes but no associated postobstructive phenomenon.  3.  A few small pulmonary nodules are stable largest of which in the right lower lobe superior segment measures 4-5 mm. No specific additional imaging workup or follow-up is necessary per 2017 Fleischner Society guidelines.

## 2024-09-26 NOTE — ED TRIAGE NOTES
Having coughing  for 15 days and got worse today.  Auditory wheezes heard.  Also cmplains of bilateral leg pain no injury.  Pt using her inhalers and nebs at home every 3 hrs for nebs

## 2024-09-26 NOTE — ED NOTES
[facility] ED Handoff Report    ED Chief Complaint: Worsening shortness of breath    ED Diagnosis:  (R06.02) Shortness of breath  Comment: Covid positive 2 weeks ago  Plan: Oh [place]man       PMH:    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    Microcytic anemia 11/17/2019    S/P coronary artery stent placement 02/02/2018    TB lung, latent     9 mos INH        Code Status:  Full Code     Falls Risk: No Band: Not applicable    Current Living Situation/Residence: lives in a house     Elimination Status: Continent: Yes     Activity Level: SBA    Patients Preferred Language:  Other: [facility]     Needed: No    Vital Signs:  /59   Pulse 92   Temp 97.9  F (36.6  C) (Oral)   Resp 27   Ht 1.524 m (5')   Wt 57.6 kg (127 lb)   SpO2 98%   BMI 24.80 kg/m       Cardiac Rhythm: NSR    Pain Score: 0/10    Is the Patient Confused:  No    Last Food or Drink: 09/26/24 at lunch    Focused Assessment:  Pt was diagnosed with Covid and had been experiencing increased shortness of breath. Was seen in ED yesterday for same reason. O2 sats on room air remain % at rest and with activity. Pt ambulated around ED without respiratory compromise. Hgb A1c found to be elevated as well.    Tests Performed: Done: Labs and Imaging    Treatments Provided:      Family Dynamics/Concerns: No    Family Updated On Visitor Policy: Yes    Plan of Care Communicated to Family: Yes    Who Was Updated about Plan of  Care: daughter in law    Belongings Checklist Done and Signed by Patient: Yes    Belongings Sent with Patient: Yes    Medications sent with patient: If they come from pharmacy, will send.    Covid: symptomatic, positive    Additional Information: Covid positive from 1-2 weeks ago    RN: Shaneka Almodovar RN   9/26/2024 1:11 PM

## 2024-09-26 NOTE — PHARMACY-ADMISSION MEDICATION HISTORY
Pharmacist Admission Medication History    Admission medication history is complete. The information provided in this note is only as accurate as the sources available at the time of the update.    Information Source(s): Patient, Family member, Clinic records, and CareEverywhere/SureScripts via in-person - granddaughter- Alivia is knowledgeable about her grandmothers medications [phone]     Pertinent Information: steroids started yesterday    Changes made to PTA medication list:  Added: None  Deleted: None  Changed: None    Allergies reviewed with patient and updates made in EHR: yes    Medication History Completed By: Timbo [facility] 9/26/2024 1:36 PM    Current Facility-Administered Medications for the 9/26/24 encounter (Hospital Encounter)   Medication    tezepelumab-ekko (TEZSPIRE) injection 210 mg     PTA Med List   Medication Sig Last Dose    acetaminophen (TYLENOL) 500 MG tablet TAKE 1 PILL BY MOUTH EVERY 6 HOURS AS NEEDED FOR PAIN     acetaminophen-codeine (TYLENOL #3) 300-30 MG per tablet Take 1 tablet by mouth every 6 hours as needed for severe pain     albuterol (PROAIR HFA/PROVENTIL HFA/VENTOLIN HFA) 108 (90 Base) MCG/ACT inhaler Inhale 2 puffs into the lungs every 4 hours as needed for shortness of breath     albuterol (PROVENTIL) (2.5 MG/3ML) 0.083% neb solution Take 1 vial (2.5 mg) by nebulization every 6 hours as needed for shortness of breath, wheezing or cough     amitriptyline (ELAVIL) 50 MG tablet Take 1 tablet (50 mg) by mouth at bedtime 9/25/2024 at hs    aspirin 81 MG EC tablet Take 81 mg by mouth daily. 9/25/2024 at am    atorvastatin (LIPITOR) 80 MG tablet Take 1 tablet (80 mg) by mouth daily 9/25/2024 at PM    calcium carbonate (TUMS) 500 MG chewable tablet Take 1 chew tab by mouth 2 times daily as needed for heartburn.     colchicine (COLCRYS) 0.6 MG tablet Take 1 tablet (0.6 mg) by mouth daily 9/25/2024 at am    Continuous Blood Gluc Sensor (FREESTYLE MONICA 2 SENSOR) MIS 1 EACH  EVERY 14 DAYS USE 1 SENSOR EVERY 14 DAYS. USE TO READ BLOOD SUGARS PER 'S INSTRUCTIONS.     diclofenac (VOLTAREN) 1 % topical gel Apply 4 g topically 4 times daily 9/25/2024    FEROSUL 325 (65 Fe) MG tablet TAKE 1 TABLET (325 MG) BY MOUTH DAILY (WITH BREAKFAST) 9/25/2024 at am    fluticasone-vilanterol (BREO ELLIPTA) 200-25 MCG/ACT inhaler Inhale 1 puff into the lungs daily 9/26/2024 at am    gabapentin (NEURONTIN) 600 MG tablet TAKE 1 PILL (600 MG) BY MOUTH 3 TIMES DAILY 9/25/2024 at pm    hydrochlorothiazide (MICROZIDE) 12.5 MG capsule Take 1 capsule (12.5 mg) by mouth every morning 9/25/2024 at am    ibuprofen (ADVIL/MOTRIN) 200 MG tablet Take 2 tablets (400 mg) by mouth every 8 hours as needed for pain.     insulin aspart (NOVOLOG PEN) 100 UNIT/ML pen Inject 16 Units subcutaneously 2 times daily (before meals). 9/25/2024 at pm    insulin glargine (LANTUS PEN) 100 UNIT/ML pen Inject 40 Units subcutaneously every morning. 9/25/2024 at am    ipratropium - albuterol 0.5 mg/2.5 mg/3 mL (DUONEB) 0.5-2.5 (3) MG/3ML neb solution Take 1 vial (3 mLs) by nebulization every 6 hours as needed for shortness of breath, wheezing or cough.     Lidocaine (LIDOCARE) 4 % Patch Place 1 patch onto the skin every 24 hours To prevent lidocaine toxicity, patient should be patch free for 12 hrs daily. 9/25/2024    metFORMIN (GLUCOPHAGE XR) 500 MG 24 hr tablet Take 2 tablets (1,000 mg) by mouth 2 times daily (with meals) 9/25/2024 at pm    metoprolol tartrate (LOPRESSOR) 25 MG tablet TAKE 1 TABLET (25 MG TOTAL) BY MOUTH 2 (TWO) TIMES A DAY FOR BLOOD PRESSURE 9/25/2024 at pm    montelukast (SINGULAIR) 10 MG tablet Take 1 tablet (10 mg) by mouth at bedtime 9/25/2024 at hs    NEXIUM 40 MG DR capsule Take 40 mg by mouth every morning (before breakfast). 9/25/2024 at am    polyethylene glycol (MIRALAX) 17 GM/Dose powder Take 17 g (1 Capful) by mouth daily 9/25/2024 at am    predniSONE (DELTASONE) 10 MG tablet Take 4 tablets (40 mg)  by mouth daily for 4 days, THEN 3 tablets (30 mg) daily for 4 days, THEN 2 tablets (20 mg) daily for 4 days, THEN 1 tablet (10 mg) daily for 4 days. 9/25/2024 at am    sennosides (SENOKOT) 8.6 MG tablet Take 1 tablet by mouth daily as needed for constipation.     SPIRIVA RESPIMAT 2.5 MCG/ACT inhaler INHALE 2 PUFFS INTO THE LUNGS DAILY 9/26/2024 at am    sucralfate (CARAFATE) 1 GM/10ML suspension Take 10 mLs (1 g) by mouth 4 times daily. 9/25/2024 at am    vitamin D3 (CHOLECALCIFEROL) 10 MCG (400 UNIT) capsule Take 1 capsule (400 Units) by mouth daily. 9/25/2024 at am

## 2024-09-26 NOTE — ED PROVIDER NOTES
Emergency Department Staff Physician Note     I had a face to face encounter with this patient seen by the Advanced Practice Provider (JANNA).  I have seen, examined, and discussed the patient with the JANNA and agree with their assessment and plan of management.    Relevant HPI:     Kulwant Amin is a 54 year old female who presents for evaluation of worsening cough and shortness of breath. She was seen on 9/13 for similar symptoms, was diagnosed with COVID-19, and left with a prednisone taper and Z-carmelo, which she finished without relief. She was seen yesterday at her PCP and was given more prednisone and DuoNebs. History of COPD. No fever.      I, Triny Pacheco, am serving as a scribe to document services personally performed by Chintan Brown D.O., based on my observations and the provider's statements to me.   I, Chintan Brown D.O., attest that Triny Pacheco was acting in a scribe capacity, has observed my performance of the services and has documented them in accordance with my direction.    ED Course:  7:56 AM  LUZ MARINA Coronel  staffed patient with me. I agree with their assessment and plan of management, and I will see the patient.  8:04 AM I met with the patient to introduce myself, gather additional history, perform my initial exam, and discuss the plan.      Brief Physical Exam:  VITAL SIGNS: /80   Pulse 92   Temp 97.9  F (36.6  C) (Oral)   Resp 29   Ht 1.524 m (5')   Wt 57.6 kg (127 lb)   SpO2 98%   BMI 24.80 kg/m      General Appearance: Well-appearing, well-nourished, no acute distress   Head:  Normocephalic, without obvious abnormality, atraumatic  Eyes:  PERRL, conjunctiva/corneas clear, EOM's intact,  ENT:  Lips, mucosa, and tongue normal, membranes are moist without pallor  Neck:  Normal ROM, symmetrical, trachea midline    Cardio:  Regular rate and rhythm, no murmur, rub or gallop, 2+ pulses symmetric in all extremities  Pulm:  Coarse wheezing throughout. Mild increase in work of  breathing  Abdomen:  Soft, non-tender, no rebound or guarding.  Musculoskeletal: Full ROM, no edema, no cyanosis, good ROM of major joints  Integument:  Warm, Dry, No erythema, No rash.    Neurologic:  Alert & oriented.  No focal deficits appreciated.  Ambulatory.  Psychiatric:  Affect normal, Judgment normal, Mood normal.        LABS  Results for orders placed or performed during the hospital encounter of 09/26/24 (from the past 24 hour(s))   [place] Draw    Narrative    The following orders were created for panel order [place] Draw.  Procedure                               Abnormality         Status                     ---------                               -----------         ------                     Extra Blue Top Tube[[id number]]                              Final result               Extra Red Top Tube[[id number]]                               Final result               Extra Green Top (Lithium...[[id number]]                      Final result               Extra Purple Top Tube[[id number]]                            Final result                 Please view results for these tests on the individual orders.   Extra Blue Top Tube   Result Value Ref Range    Hold Specimen JIC    Extra Red Top Tube   Result Value Ref Range    Hold Specimen JIC    Extra Green Top (Lithium Heparin) Tube   Result Value Ref Range    Hold Specimen JIC    Extra Purple Top Tube   Result Value Ref Range    Hold Specimen JIC    CBC with platelets + differential    Narrative    The following orders were created for panel order CBC with platelets + differential.  Procedure                               Abnormality         Status                     ---------                               -----------         ------                     CBC with platelets and d...[[id number]]                      Final result                 Please view results for these tests on the individual orders.   Comprehensive metabolic panel   Result Value Ref Range     Sodium 135 135 - 145 mmol/L    Potassium 4.3 3.4 - 5.3 mmol/L    Carbon Dioxide (CO2) 23 22 - 29 mmol/L    Anion Gap 13 7 - 15 mmol/L    Urea Nitrogen 8.8 6.0 - 20.0 mg/dL    Creatinine 0.49 (L) 0.51 - 0.95 mg/dL    GFR Estimate >90 >60 mL/min/1.73m2    Calcium 9.0 8.8 - 10.4 mg/dL    Chloride 99 98 - 107 mmol/L    Glucose 262 (H) 70 - 99 mg/dL    Alkaline Phosphatase 120 40 - 150 U/L    AST 50 (H) 0 - 45 U/L    ALT 45 0 - 50 U/L    Protein Total 7.1 6.4 - 8.3 g/dL    Albumin 4.1 3.5 - 5.2 g/dL    Bilirubin Total 0.4 <=1.2 mg/dL   Nt probnp inpatient   Result Value Ref Range    N terminal Pro BNP Inpatient 65 0 - 900 pg/mL   CBC with platelets and differential   Result Value Ref Range    WBC Count 9.6 4.0 - 11.0 10e3/uL    RBC Count 4.63 3.80 - 5.20 10e6/uL    Hemoglobin 12.6 11.7 - 15.7 g/dL    Hematocrit 39.4 35.0 - 47.0 %    MCV 85 78 - 100 fL    MCH 27.2 26.5 - 33.0 pg    MCHC 32.0 31.5 - 36.5 g/dL    RDW 14.5 10.0 - 15.0 %    Platelet Count 260 150 - 450 10e3/uL    % Neutrophils 77 %    % Lymphocytes 18 %    % Monocytes 4 %    % Eosinophils 0 %    % Basophils 0 %    % Immature Granulocytes 1 %    NRBCs per 100 WBC 0 <1 /100    Absolute Neutrophils 7.3 1.6 - 8.3 10e3/uL    Absolute Lymphocytes 1.7 0.8 - 5.3 10e3/uL    Absolute Monocytes 0.4 0.0 - 1.3 10e3/uL    Absolute Eosinophils 0.0 0.0 - 0.7 10e3/uL    Absolute Basophils 0.0 0.0 - 0.2 10e3/uL    Absolute Immature Granulocytes 0.1 <=0.4 10e3/uL    Absolute NRBCs 0.0 10e3/uL   Chest XR,  PA & LAT    Narrative    EXAM: XR CHEST 2 VIEWS  LOCATION: [facility]  DATE: 9/26/2024    INDICATION: wheeze, cough, covid history  COMPARISON: 9/13/2024      Impression    IMPRESSION: Mild bibasilar atelectasis/scar. Small right pleural effusion. Stable cardiac silhouette at the upper limits of normal for size. Calcified granuloma. Normal pulmonary vascularity.   Chest CT w/o contrast    Narrative    EXAM: CT CHEST W/O CONTRAST  LOCATION: [facility]  DATE: 9/26/2024    INDICATION: shortness of breath, wheezing  COMPARISON: CT pulmonary angiogram 6/15/2024, 7/24/2023  TECHNIQUE: CT chest without IV contrast. Multiplanar reformats were obtained. Dose reduction techniques were used.  CONTRAST: None.    FINDINGS:   LUNGS AND PLEURA: Symmetric lung inflation. Minimal subpleural scarring in the bases in the posterior sulci and along the right lateral costal pleura towards the lateral sulcus. There is a solid peribronchial vascular nodule in the right lower lobe   superior segment measuring 4-5 mm (series 4, image 100). Smaller, 3 mm nodule with internal calcification along the medial right major fissure (series 4, image 114) is also stable. There is a ovoid calcification along the posterolateral costal pleura of   the right lower lobe, definitively benign. Central airways are patent. There are are visible secretions within multiple branch subsegmental airways in the basilar segments of both lower lobes. No postobstructive phenomenon. No pleural space abnormality.    MEDIASTINUM: Cardiac chambers are normal in size. No pericardial effusion. Nonaneurysmal thoracic aorta. 2 vessel arch anatomy. Mild to moderate calcified plaque is present in the proximal great vessels, aortic arch and descending thoracic aorta.   Esophagus is decompressed. No lymphadenopathy.    CORONARY ARTERY CALCIFICATION: Previous intervention (stents or CABG).    UPPER ABDOMEN: No actionable findings in the imaged upper abdomen.    MUSCULOSKELETAL: Thoracic vertebra are maintained in height and shape. No fractures or bone lesions.      Impression    IMPRESSION:     1.  No acute lung inflammatory process.  2.  Retained mucus within multiple subsegmental branch airways in the lower lobes but no associated postobstructive phenomenon.  3.  A few small pulmonary nodules are stable largest of which in the right lower lobe superior segment measures 4-5 mm. No specific additional  imaging workup or follow-up is necessary per 2017 Fleischner Society guidelines.       *Note: Due to a large number of results and/or encounters for the requested time period, some results have not been displayed. A complete set of results can be found in Results Review.         RADIOLOGY  Chest CT w/o contrast   Final Result   IMPRESSION:       1.  No acute lung inflammatory process.   2.  Retained mucus within multiple subsegmental branch airways in the lower lobes but no associated postobstructive phenomenon.   3.  A few small pulmonary nodules are stable largest of which in the right lower lobe superior segment measures 4-5 mm. No specific additional imaging workup or follow-up is necessary per 2017 Fleischner Society guidelines.         Chest XR,  PA & LAT   Final Result   IMPRESSION: Mild bibasilar atelectasis/scar. Small right pleural effusion. Stable cardiac silhouette at the upper limits of normal for size. Calcified granuloma. Normal pulmonary vascularity.           I have independently interpreted the above image, no obvious consolidation or pneumothorax on chest CT. See radiology report for detail.    EKG:    Rate: 88 bpm  Rhythm: Normal Sinus Rhythm  Axis: Normal  Interval: Normal  Conduction: Normal  QRS: Normal  ST: Normal  T-wave: Normal  QT: Not prolonged  Comparison EKG: no significant change compared to 9 July 2024  Impression:  No acute ischemic change   I have independently reviewed and interpreted today's EKG, pending Cardiologist read       Impression / ED Plan:  I had a face to face encounter with this patient seen by the Advanced Practice Provider (JANNA). I personally made/approved the management plan and take responsibility for the patient management. I personally saw patient and performed a substantive portion of the visit including all aspects of the medical decision making.     Kulwant Amin is a 54 year old female presents to the ED for evaluation of shortness of breath.  Patient has  known history of COPD, no recent diagnosis of COVID-19.  She was on   Paxlovid, and at that time was having improving symptoms.  Unfortunately, after she came off the Paxlovid she started getting worse again.  She never completely resolved even on the medication.  Initial concern for this patient was continuing COVID versus influenza versus RSV versus pneumonia versus other acute process.  Clinically unlikely to represent PE, but is somewhat concerning for potential for an exacerbation of her COPD.  She was quite wheezy on exam and did have increased work of breathing.  This did improve mildly with DuoNebs, however with ambulation she is still struggling significantly to breathe.  Chest x-ray did not show any definitive pneumonia, but based on my read I decided that we needed to perform a CT scan of the chest.  CT does fortunately show no evidence of pneumonia or pneumothorax.  At this time my suspicion is there is an exacerbation of her COPD  caused by the COVID infection.  As she is still struggling quite a bit to be able to ambulate due to the severity of her shortness of breath, we will admit for further management.        1. Shortness of breath        Chintan Brown D.O.  Emergency Medicine  [facility] EMERGENCY DEPARTMENT  [address] [place] [id number]  [phone]  Dept: [phone]      Chintan Brown,   09/26/24 1242

## 2024-09-26 NOTE — PLAN OF CARE
"  Problem: Adult Inpatient Plan of Care  Goal: Plan of Care Review  Description: The Plan of Care Review/Shift note should be completed every shift.  The Outcome Evaluation is a brief statement about your assessment that the patient is improving, declining, or no change.  This information will be displayed automatically on your shift  note.  Outcome: Progressing  Flowsheets (Taken 9/26/2024 1809)  Plan of Care Reviewed With:   patient   family  Goal: Patient-Specific Goal (Individualized)  Description: You can add care plan individualizations to a care plan. Examples of Individualization might be:  \"Parent requests to be called daily at 9am for status\", \"I have a hard time hearing out of my right ear\", or \"Do not touch me to wake me up as it startles  me\".  Outcome: Progressing  Goal: Absence of Hospital-Acquired Illness or Injury  Outcome: Progressing  Intervention: Identify and Manage Fall Risk  Recent Flowsheet Documentation  Taken 9/26/2024 1625 by Bandar Hooper, RN  Safety Promotion/Fall Prevention:   activity supervised   clutter free environment maintained   lighting adjusted  Intervention: Prevent Skin Injury  Recent Flowsheet Documentation  Taken 9/26/2024 1625 by Bandar Hooper, RN  Body Position: position changed independently  Goal: Optimal Comfort and Wellbeing  Outcome: Progressing  Goal: Readiness for Transition of Care  Outcome: Progressing   Goal Outcome Evaluation:      Plan of Care Reviewed With: patient, family                 "

## 2024-09-26 NOTE — ED NOTES
Patient up and walked with cane to the bathroom and back to her bed. O2 sats @100% entire time up and walking.

## 2024-09-26 NOTE — ED PROVIDER NOTES
Emergency Department Encounter   NAME: Kulwant Amin ; AGE: 54 year old female ; YOB: 1970 ; MRN: [id number] ; PCP: Adrien Cardoza   ED PROVIDER: Jessica Muñiz PA-C    Evaluation Date & Time:   9/26/2024  7:49 AM    CHIEF COMPLAINT:  Cough and Shortness of Breath      Impression and Plan   MDM: Kulwant Amin is a 54 year old female who presents to the ED for evaluation of cough and shortness of breath. She is exhibiting increased effort of breathing on initial assessment and physical exam is notable for bilateral wheezing and course breath sounds. Patient is vitally stable and does not become hypoxic throughout ED course.     History:  Supplemental history from: family member   External Record(s) reviewed: 9/13 visit to [facility] ED, 9/25 visit to pulmonology clinic     Work Up:  Emergent/Severe conditions considered and evaluated for:   Differential diagnosis includes COPD exacerbation, new onset CHF, COVID induced pneumonia, pulmonary embolism.   CBC, CMP, BNP unremarkable.   CXR is interpreted by radiology without infiltrates however after discussion with Dr. Brown ordered CT chest to further evaluate for infection due to patient's symptoms and hazy area in the left lower lobe. CT chest does not show any findings of acute infection or pneumonia- shows several pulmonary nodules which seem to be stable from previous studies. EKG is nonischemic and shows no acute changes since previous.   Patient given duoneb with minimal improvement. Lung sounds are not improved.   Patient does appear to show increased work of breathing while at rest. Ambulation trial done and patient was not hypoxic however had increased work of breathing and difficulty ambulating.   I independently reviewed and interpreted - CXR, labs   In additional to work up documented, I considered the following work up:   Did consider further workup for pulmonary embolism although patient's shortness of breath is not acute, she is not having other  symptoms to suggest pulmonary embolism, is not tachycardic throughout ED course, is not hypoxic.   Medications given that require intensive monitoring for toxicity: None     External consultation:  Discussion of management with another provider: Dr. Brown, Hospitalist Dr. Tsang    Complicating factors:  Care impacted by chronic illness: latent TB, recent COVID infection, COPD, asthma   Care affected by social determinants of health: language barrier     Disposition considerations:   Discussed with patient that there is no pneumonia causing her symptoms. Low suspicion for CHF with normal BNP and no signs of fluid overload on physical exam or CXR. Low suspicion for COPD exacerbation as symptoms are not improved with duoneb. Discussed that it is possible her shortness of breath is ongoing from being diagnosed with covid earlier this month.   Discussed case with hospitalist Dr. Tsang. Patient continues to be short of breath and have bilateral course breath sounds with inspiratory and expiratory wheezes and difficulty ambulating. Hospitalist agrees to admit patient for her shortness of breath symptoms for further workup and treatment.       Not Applicable    ED COURSE:  8:07 AM I met and introduced myself to the patient. I gathered initial history and performed my physical exam. We discussed plan for initial workup.   8:10 I have staffed the patient with Dr. Brown, ED MD, who has evaluated the patient and agrees with all aspects of today's care.    I rechecked the patient and discussed results, discharge, follow up, and reasons to return to the ED.     At the conclusion of the encounter I discussed the results of all the tests and the disposition. The questions were answered. The patient or family acknowledged understanding and was agreeable with the care plan.    FINAL IMPRESSION:    ICD-10-CM    1. Shortness of breath  R06.02             MEDICATIONS GIVEN IN THE EMERGENCY DEPARTMENT:  Medications   ipratropium -  albuterol 0.5 mg/2.5 mg/3 mL (DUONEB) neb solution 3 mL (3 mLs Nebulization $Given 9/26/24 5873)         NEW PRESCRIPTIONS STARTED AT TODAY'S ED VISIT:  New Prescriptions    No medications on file         HPI   Use of Intrepreter: Language Line  used: Pepito     Kulwant Amin is a 54 year old female with a pertinent history of COPD, recent COVID infection, latent TB who presents to the ED by self with family member for evaluation of cough and shortness of breath. Patient was seen on 9/13 here for similar symptoms, diagnosed with COVID and was discharged with prednisone and paxlovid. Symptoms did not improve with these medications per family member. Patient was seen again yesterday by her pulmonology clinic and restarted with the prednisone taper along with duonebs. Instructed to continue her at home medications including albuterol PRN, singulair, breo, spiriva.   Feels more short of breath and worsening cough today which is why she returns to the ED. Her last nebulizer treatment was 1 hour prior to arrival with minimal improvement. Patient states usually when she does her nebulizers she has very short term improvement before worsening again.       Medical History     Past Medical History:   Diagnosis Date    Acute asthma exacerbation 01/06/2020    Anxiety     Arthritis     Asthma exacerbation 11/19/2015    Chronic obstructive pulmonary disease, unspecified COPD type (H)     COPD (chronic obstructive pulmonary disease) (H)     Coronary artery disease due to lipid rich plaque     Depression     Epigastric pain 12/15/2021    Essential hypertension     GERD (gastroesophageal reflux disease)     Infection due to 2019 novel coronavirus 01/03/2022    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    Microcytic anemia 11/17/2019    S/P coronary artery stent placement 02/02/2018    TB lung, latent        Past Surgical History:   Procedure Laterality Date    CORONARY STENT PLACEMENT  2018    CV CORONARY ANGIOGRAM N/A 1/25/2018     Procedure: Coronary Angiogram;  Surgeon: Angelo Serrano MD;  Location: [facility] Cath Lab;  Service:     CV CORONARY ANGIOGRAM N/A 2022    Procedure: Coronary Angiogram;  Surgeon: Irwin Cano MD;  Location: [facility] CATH LAB CV    CV CORONARY ANGIOGRAM  2022    Procedure: ;  Surgeon: Irwin Cano MD;  Location: [facility] CATH [facility] CV    WA ESOPHAGOGASTRODUODENOSCOPY TRANSORAL DIAGNOSTIC N/A 12/10/2018    Procedure: ESOPHAGOGASTRODUODENOSCOPY (EGD);  Surgeon: Eddie Renteria MD;  Location: [facility];  Service: Gastroenterology    WA ESOPHAGOGASTRODUODENOSCOPY TRANSORAL DIAGNOSTIC N/A 12/3/2020    Procedure: ESOPHAGOGASTRODUODENOSCOPY (EGD) with biospies ;  Surgeon: Avi Crow MD;  Location: [facility];  Service: Gastroenterology    ZZ COLONOSCOPY W/WO BRUSH/WASH N/A 12/10/2018    Procedure: COLONOSCOPY with polypectomy using biopsy forceps;  Surgeon: Eddie Renteria MD;  Location: [facility];  Service: Gastroenterology       Family History   Problem Relation Age of Onset    Other - See Comments Mother          of an intestinal problem    Ulcers Father          of gastritis    Breast Cancer No family hx of     Ovarian Cancer No family hx of     Colon Cancer No family hx of        Social History     Tobacco Use    Smoking status: Former     Current packs/day: 0.00     Average packs/day: 1 pack/day for 30.0 years (30.0 ttl pk-yrs)     Types: Cigarettes     Start date: 1973     Quit date: 2003     Years since quittin.7     Passive exposure: Never    Smokeless tobacco: Never    Tobacco comments:     No passive exposure   Vaping Use    Vaping status: Never Used   Substance Use Topics    Alcohol use: No    Drug use: No       acetaminophen (TYLENOL) 500 MG tablet  acetaminophen-codeine (TYLENOL #3) 300-30 MG per tablet  albuterol (PROAIR HFA/PROVENTIL HFA/VENTOLIN HFA) 108 (90 Base) MCG/ACT inhaler  albuterol (PROVENTIL) (2.5 MG/3ML) 0.083% neb  solution  amitriptyline (ELAVIL) 50 MG tablet  aspirin 81 MG EC tablet  atorvastatin (LIPITOR) 80 MG tablet  calcium carbonate (TUMS) 500 MG chewable tablet  colchicine (COLCRYS) 0.6 MG tablet  Continuous Blood Gluc Sensor (FREESTYLE MONICA 2 SENSOR) MISC  diclofenac (VOLTAREN) 1 % topical gel  FEROSUL 325 (65 Fe) MG tablet  fluticasone-vilanterol (BREO ELLIPTA) 200-25 MCG/ACT inhaler  gabapentin (NEURONTIN) 600 MG tablet  hydrochlorothiazide (MICROZIDE) 12.5 MG capsule  ibuprofen (ADVIL/MOTRIN) 200 MG tablet  insulin aspart (NOVOLOG PEN) 100 UNIT/ML pen  insulin glargine (LANTUS PEN) 100 UNIT/ML pen  insulin pen needle (32G X 4 MM) 32G X 4 MM miscellaneous  ipratropium - albuterol 0.5 mg/2.5 mg/3 mL (DUONEB) 0.5-2.5 (3) MG/3ML neb solution  Lidocaine (LIDOCARE) 4 % Patch  metFORMIN (GLUCOPHAGE XR) 500 MG 24 hr tablet  metoprolol tartrate (LOPRESSOR) 25 MG tablet  montelukast (SINGULAIR) 10 MG tablet  NEXIUM 40 MG DR capsule  polyethylene glycol (MIRALAX) 17 GM/Dose powder  predniSONE (DELTASONE) 10 MG tablet  sennosides (SENOKOT) 8.6 MG tablet  SPIRIVA RESPIMAT 2.5 MCG/ACT inhaler  sucralfate (CARAFATE) 1 GM/10ML suspension  vitamin D3 (CHOLECALCIFEROL) 10 MCG (400 UNIT) capsule          Physical Exam     First Vitals:  Patient Vitals for the past 24 hrs:   BP Temp Temp src Pulse Resp SpO2 Height Weight   09/26/24 1125 98/60 -- -- 93 29 100 % -- --   09/26/24 1115 126/79 -- -- 93 28 98 % -- --   09/26/24 1100 124/75 -- -- 91 23 98 % -- --   09/26/24 1045 121/78 -- -- 87 -- 99 % -- --   09/26/24 1038 121/75 -- -- 96 -- 100 % -- --   09/26/24 0830 (!) 146/81 -- -- 93 21 99 % -- --   09/26/24 0815 -- -- -- 82 24 98 % -- --   09/26/24 0800 135/83 -- -- 84 26 98 % -- --   09/26/24 0759 -- -- -- -- -- -- 1.524 m (5') 57.6 kg (127 lb)   09/26/24 0753 125/78 -- -- 88 -- 99 % -- --   09/26/24 0747 -- -- -- -- 28 -- -- --   09/26/24 0746 133/73 97.9  F (36.6  C) Oral 88 -- 98 % -- --         PHYSICAL EXAM:   Constitutional:  alert, ill-appearing, auditory wheezing   Head: normocephalic, atraumatic  Eyes: EOM intact  Neck: ROM normal  Cardio: regular rate, regular rhythm, no murmurs   Pulmonary: increased breathing effort, diffuse bilateral course breath sounds with inspiratory and expiratory wheezing   MSK: no bilateral lower leg edema, no lower leg tenderness   Skin: no lesions, rashes, or erythema  Neuro: no focal deficit, at baseline  Psychiatric: mood and behavior within normal limit      Results     LAB:  All pertinent labs reviewed and interpreted  Labs Ordered and Resulted from Time of ED Arrival to Time of ED Departure   COMPREHENSIVE METABOLIC PANEL - Abnormal       Result Value    Sodium 135      Potassium 4.3      Carbon Dioxide (CO2) 23      Anion Gap 13      Urea Nitrogen 8.8      Creatinine 0.49 (*)     GFR Estimate >90      Calcium 9.0      Chloride 99      Glucose 262 (*)     Alkaline Phosphatase 120      AST 50 (*)     ALT 45      Protein Total 7.1      Albumin 4.1      Bilirubin Total 0.4     NT PROBNP INPATIENT - Normal    N terminal Pro BNP Inpatient 65     CBC WITH PLATELETS AND DIFFERENTIAL    WBC Count 9.6      RBC Count 4.63      Hemoglobin 12.6      Hematocrit 39.4      MCV 85      MCH 27.2      MCHC 32.0      RDW 14.5      Platelet Count 260      % Neutrophils 77      % Lymphocytes 18      % Monocytes 4      % Eosinophils 0      % Basophils 0      % Immature Granulocytes 1      NRBCs per 100 WBC 0      Absolute Neutrophils 7.3      Absolute Lymphocytes 1.7      Absolute Monocytes 0.4      Absolute Eosinophils 0.0      Absolute Basophils 0.0      Absolute Immature Granulocytes 0.1      Absolute NRBCs 0.0         RADIOLOGY:  Chest CT w/o contrast   Final Result   IMPRESSION:       1.  No acute lung inflammatory process.   2.  Retained mucus within multiple subsegmental branch airways in the lower lobes but no associated postobstructive phenomenon.   3.  A few small pulmonary nodules are stable largest of which in  the right lower lobe superior segment measures 4-5 mm. No specific additional imaging workup or follow-up is necessary per 2017 Fleischner Society guidelines.         Chest XR,  PA & LAT   Final Result   IMPRESSION: Mild bibasilar atelectasis/scar. Small right pleural effusion. Stable cardiac silhouette at the upper limits of normal for size. Calcified granuloma. Normal pulmonary vascularity.          ECG:  Performed at: 0757    Impression: sinus rhythm, left anterior fascicular block, abnormal EKG. When compared to EKG of 7/9/2024 no significant change was found      Rate:  88  Rhythm: sinus  P-R-T axis: 49   -47   47  SD Interval: 148  QRS Interval: 78  QTc Interval: 457  ST Changes: no ST changes     EKG results reviewed and interpreted by Dr. Kevin ED MD.     PROCEDURES:  None     Jessica Muñiz PA-C   Emergency Medicine   [facility] EMERGENCY DEPARTMENT       Jessica Muñiz PA-C  09/26/24 8103

## 2024-09-27 LAB
GLUCOSE BLDC GLUCOMTR-MCNC: 205 MG/DL (ref 70–99)
GLUCOSE BLDC GLUCOMTR-MCNC: 207 MG/DL (ref 70–99)
GLUCOSE BLDC GLUCOMTR-MCNC: 313 MG/DL (ref 70–99)
GLUCOSE BLDC GLUCOMTR-MCNC: 315 MG/DL (ref 70–99)
GLUCOSE BLDC GLUCOMTR-MCNC: 382 MG/DL (ref 70–99)

## 2024-09-27 PROCEDURE — G0008 ADMIN INFLUENZA VIRUS VAC: HCPCS | Performed by: INTERNAL MEDICINE

## 2024-09-27 PROCEDURE — 250N000011 HC RX IP 250 OP 636: Performed by: INTERNAL MEDICINE

## 2024-09-27 PROCEDURE — 250N000013 HC RX MED GY IP 250 OP 250 PS 637: Performed by: INTERNAL MEDICINE

## 2024-09-27 PROCEDURE — 999N000157 HC STATISTIC RCP TIME EA 10 MIN

## 2024-09-27 PROCEDURE — 94640 AIRWAY INHALATION TREATMENT: CPT

## 2024-09-27 PROCEDURE — 250N000013 HC RX MED GY IP 250 OP 250 PS 637: Performed by: FAMILY MEDICINE

## 2024-09-27 PROCEDURE — 120N000001 HC R&B MED SURG/OB

## 2024-09-27 PROCEDURE — 90673 RIV3 VACCINE NO PRESERV IM: CPT | Performed by: INTERNAL MEDICINE

## 2024-09-27 PROCEDURE — 99232 SBSQ HOSP IP/OBS MODERATE 35: CPT | Performed by: INTERNAL MEDICINE

## 2024-09-27 PROCEDURE — 250N000009 HC RX 250: Performed by: INTERNAL MEDICINE

## 2024-09-27 PROCEDURE — 94640 AIRWAY INHALATION TREATMENT: CPT | Mod: 76

## 2024-09-27 RX ORDER — MONTELUKAST SODIUM 10 MG/1
10 TABLET ORAL AT BEDTIME
Status: DISCONTINUED | OUTPATIENT
Start: 2024-09-27 | End: 2024-10-01 | Stop reason: HOSPADM

## 2024-09-27 RX ORDER — SWAB
1 SWAB, NON-MEDICATED MISCELLANEOUS DAILY
Status: DISCONTINUED | OUTPATIENT
Start: 2024-09-27 | End: 2024-09-27

## 2024-09-27 RX ORDER — ATORVASTATIN CALCIUM 40 MG/1
80 TABLET, FILM COATED ORAL DAILY
Status: DISCONTINUED | OUTPATIENT
Start: 2024-09-27 | End: 2024-10-01 | Stop reason: HOSPADM

## 2024-09-27 RX ORDER — COLCHICINE 0.6 MG/1
0.6 TABLET ORAL DAILY
Status: DISCONTINUED | OUTPATIENT
Start: 2024-09-27 | End: 2024-10-01 | Stop reason: HOSPADM

## 2024-09-27 RX ORDER — PANTOPRAZOLE SODIUM 40 MG/1
40 TABLET, DELAYED RELEASE ORAL
Status: DISCONTINUED | OUTPATIENT
Start: 2024-09-27 | End: 2024-10-01 | Stop reason: HOSPADM

## 2024-09-27 RX ORDER — FERROUS SULFATE 325(65) MG
325 TABLET ORAL
Status: DISCONTINUED | OUTPATIENT
Start: 2024-09-28 | End: 2024-10-01 | Stop reason: HOSPADM

## 2024-09-27 RX ORDER — POLYETHYLENE GLYCOL 3350 17 G/17G
17 POWDER, FOR SOLUTION ORAL DAILY
Status: DISCONTINUED | OUTPATIENT
Start: 2024-09-27 | End: 2024-09-27

## 2024-09-27 RX ORDER — CODEINE PHOSPHATE AND GUAIFENESIN 10; 100 MG/5ML; MG/5ML
5 SOLUTION ORAL EVERY 4 HOURS PRN
Status: DISCONTINUED | OUTPATIENT
Start: 2024-09-27 | End: 2024-10-01 | Stop reason: HOSPADM

## 2024-09-27 RX ORDER — ASPIRIN 81 MG/1
81 TABLET ORAL DAILY
Status: DISCONTINUED | OUTPATIENT
Start: 2024-09-27 | End: 2024-10-01 | Stop reason: HOSPADM

## 2024-09-27 RX ORDER — PANTOPRAZOLE SODIUM 40 MG/1
40 TABLET, DELAYED RELEASE ORAL
Status: DISCONTINUED | OUTPATIENT
Start: 2024-09-28 | End: 2024-09-27

## 2024-09-27 RX ORDER — GABAPENTIN 600 MG/1
600 TABLET ORAL 3 TIMES DAILY
Status: DISCONTINUED | OUTPATIENT
Start: 2024-09-27 | End: 2024-09-27

## 2024-09-27 RX ORDER — HYDROCHLOROTHIAZIDE 12.5 MG/1
12.5 CAPSULE ORAL EVERY MORNING
Status: DISCONTINUED | OUTPATIENT
Start: 2024-09-27 | End: 2024-10-01 | Stop reason: HOSPADM

## 2024-09-27 RX ORDER — POLYETHYLENE GLYCOL 3350 17 G/17G
17 POWDER, FOR SOLUTION ORAL DAILY
Status: DISCONTINUED | OUTPATIENT
Start: 2024-09-27 | End: 2024-10-01 | Stop reason: HOSPADM

## 2024-09-27 RX ORDER — BENZONATATE 100 MG/1
100 CAPSULE ORAL 3 TIMES DAILY PRN
Status: COMPLETED | OUTPATIENT
Start: 2024-09-27 | End: 2024-09-28

## 2024-09-27 RX ORDER — METOPROLOL TARTRATE 25 MG/1
25 TABLET, FILM COATED ORAL 2 TIMES DAILY
Status: DISCONTINUED | OUTPATIENT
Start: 2024-09-27 | End: 2024-10-01 | Stop reason: HOSPADM

## 2024-09-27 RX ORDER — SUCRALFATE ORAL 1 G/10ML
1 SUSPENSION ORAL 4 TIMES DAILY
Status: DISCONTINUED | OUTPATIENT
Start: 2024-09-27 | End: 2024-10-01 | Stop reason: HOSPADM

## 2024-09-27 RX ORDER — GABAPENTIN 300 MG/1
600 CAPSULE ORAL 3 TIMES DAILY
Status: DISCONTINUED | OUTPATIENT
Start: 2024-09-27 | End: 2024-10-01 | Stop reason: HOSPADM

## 2024-09-27 RX ORDER — SENNOSIDES 8.6 MG
1 TABLET ORAL DAILY PRN
Status: DISCONTINUED | OUTPATIENT
Start: 2024-09-27 | End: 2024-10-01 | Stop reason: HOSPADM

## 2024-09-27 RX ADMIN — IPRATROPIUM BROMIDE AND ALBUTEROL SULFATE 3 ML: .5; 3 SOLUTION RESPIRATORY (INHALATION) at 07:47

## 2024-09-27 RX ADMIN — PANTOPRAZOLE SODIUM 40 MG: 40 TABLET, DELAYED RELEASE ORAL at 11:51

## 2024-09-27 RX ADMIN — BENZONATATE 100 MG: 100 CAPSULE ORAL at 13:14

## 2024-09-27 RX ADMIN — IPRATROPIUM BROMIDE AND ALBUTEROL SULFATE 3 ML: .5; 3 SOLUTION RESPIRATORY (INHALATION) at 14:41

## 2024-09-27 RX ADMIN — GABAPENTIN 600 MG: 300 CAPSULE ORAL at 13:14

## 2024-09-27 RX ADMIN — BENZONATATE 100 MG: 100 CAPSULE ORAL at 02:53

## 2024-09-27 RX ADMIN — SUCRALFATE 1 G: 1 SUSPENSION ORAL at 11:50

## 2024-09-27 RX ADMIN — ACETAMINOPHEN 650 MG: 325 TABLET ORAL at 08:58

## 2024-09-27 RX ADMIN — HYDROCHLOROTHIAZIDE 12.5 MG: 12.5 CAPSULE ORAL at 11:50

## 2024-09-27 RX ADMIN — POLYETHYLENE GLYCOL 3350 17 G: 17 POWDER, FOR SOLUTION ORAL at 11:49

## 2024-09-27 RX ADMIN — CHOLECALCIFEROL (VITAMIN D3) 10 MCG (400 UNIT) TABLET 400 UNITS: at 11:50

## 2024-09-27 RX ADMIN — GUAIFENESIN 1200 MG: 600 TABLET ORAL at 19:46

## 2024-09-27 RX ADMIN — AMITRIPTYLINE HYDROCHLORIDE 50 MG: 25 TABLET, FILM COATED ORAL at 22:23

## 2024-09-27 RX ADMIN — DOXYCYCLINE HYCLATE 100 MG: 100 CAPSULE ORAL at 08:51

## 2024-09-27 RX ADMIN — METOPROLOL TARTRATE 25 MG: 25 TABLET, FILM COATED ORAL at 22:21

## 2024-09-27 RX ADMIN — IPRATROPIUM BROMIDE AND ALBUTEROL SULFATE 3 ML: .5; 3 SOLUTION RESPIRATORY (INHALATION) at 20:05

## 2024-09-27 RX ADMIN — METOPROLOL TARTRATE 25 MG: 25 TABLET, FILM COATED ORAL at 11:51

## 2024-09-27 RX ADMIN — DOXYCYCLINE HYCLATE 100 MG: 100 CAPSULE ORAL at 19:46

## 2024-09-27 RX ADMIN — ASPIRIN 81 MG: 81 TABLET, COATED ORAL at 11:50

## 2024-09-27 RX ADMIN — GUAIFENESIN 1200 MG: 600 TABLET ORAL at 08:51

## 2024-09-27 RX ADMIN — ACETAMINOPHEN 650 MG: 325 TABLET ORAL at 13:14

## 2024-09-27 RX ADMIN — GUAIFENESIN AND CODEINE PHOSPHATE 5 ML: 100; 10 SOLUTION ORAL at 10:16

## 2024-09-27 RX ADMIN — ATORVASTATIN CALCIUM 80 MG: 40 TABLET, FILM COATED ORAL at 11:50

## 2024-09-27 RX ADMIN — ACETAMINOPHEN 650 MG: 325 TABLET ORAL at 19:46

## 2024-09-27 RX ADMIN — SUCRALFATE 1 G: 1 SUSPENSION ORAL at 22:20

## 2024-09-27 RX ADMIN — IPRATROPIUM BROMIDE AND ALBUTEROL SULFATE 3 ML: .5; 3 SOLUTION RESPIRATORY (INHALATION) at 01:58

## 2024-09-27 RX ADMIN — DICLOFENAC SODIUM 4 G: 10 GEL TOPICAL at 17:44

## 2024-09-27 RX ADMIN — MONTELUKAST 10 MG: 10 TABLET, FILM COATED ORAL at 22:21

## 2024-09-27 RX ADMIN — COLCHICINE 0.6 MG: 0.6 TABLET, FILM COATED ORAL at 11:49

## 2024-09-27 RX ADMIN — SUCRALFATE 1 G: 1 SUSPENSION ORAL at 17:43

## 2024-09-27 RX ADMIN — INFLUENZA A VIRUS A/WEST VIRGINIA/30/2022 (H1N1) RECOMBINANT HEMAGGLUTININ ANTIGEN, INFLUENZA A VIRUS A/MASSACHUSETTS/18/2022 (H3N2) RECOMBINANT HEMAGGLUTININ ANTIGEN, AND INFLUENZA B VIRUS B/AUSTRIA/1359417/2021 RECOMBINANT HEMAGGLUTININ ANTIGEN 0.5 ML: 45; 45; 45 INJECTION INTRAMUSCULAR at 10:18

## 2024-09-27 RX ADMIN — ENOXAPARIN SODIUM 40 MG: 40 INJECTION SUBCUTANEOUS at 17:42

## 2024-09-27 RX ADMIN — CALCIUM CARBONATE (ANTACID) CHEW TAB 500 MG 1000 MG: 500 CHEW TAB at 08:57

## 2024-09-27 RX ADMIN — GABAPENTIN 600 MG: 300 CAPSULE ORAL at 22:21

## 2024-09-27 RX ADMIN — DICLOFENAC SODIUM 4 G: 10 GEL TOPICAL at 22:24

## 2024-09-27 RX ADMIN — DICLOFENAC SODIUM 4 G: 10 GEL TOPICAL at 13:15

## 2024-09-27 RX ADMIN — SUCRALFATE 1 G: 1 SUSPENSION ORAL at 13:14

## 2024-09-27 RX ADMIN — METHYLPREDNISOLONE SODIUM SUCCINATE 40 MG: 40 INJECTION, POWDER, FOR SOLUTION INTRAMUSCULAR; INTRAVENOUS at 08:51

## 2024-09-27 ASSESSMENT — ACTIVITIES OF DAILY LIVING (ADL)
ADLS_ACUITY_SCORE: 38
ADLS_ACUITY_SCORE: 21
ADLS_ACUITY_SCORE: 38

## 2024-09-27 NOTE — PLAN OF CARE
Problem: COPD (Chronic Obstructive Pulmonary Disease)  Goal: Effective Oxygenation and Ventilation  Outcome: Progressing     Problem: Comorbidity Management  Goal: Maintenance of Asthma Control  Outcome: Progressing     Problem: Comorbidity Management  Goal: Blood Glucose Levels Within Targeted Range  Outcome: Progressing   Goal Outcome Evaluation:       Patient's cough improved after she took tessalon capsule at 0253. Up to the bathroom with assist of one using cane. Voids well. Noted to have dyspnea when ambulating to the bathroom. Has expiratory wheezes, coarse lung sounds. Received Duo Nebs by RT.  Remains on room air, SpO2 95-96%. 2 am . MD made aware.

## 2024-09-27 NOTE — PROGRESS NOTES
[facility]    Medicine Progress Note - Hospitalist Service    Date of Admission:  9/26/2024    Assessment & Plan      Kulwant Amin is a 54 year old female admitted on 9/26/2024. She has COPD, asthma, recent COVID infection, latent TB. Recent Prednisone and Paxlovid for COVID-19 infection. Came in for worse cough and sob.    Acute on chronic resp distress  Acute COPD exacerbation  -Dyspnea and cough worsening  -wheezing on exam  -chest ct: no acute inflammatory changes;+mucus and nodules  -start on Solumedrol, nebs and Doxycycline    DM 2 uncontrolled  -hgb A1c 7.3  -PTA: Lantus 40 unit(s) QAM, Novolog 16 units BID  -lantus 20 units BID and sliding scale insulin; premeal 1:10 Novolog. Due to uncontrolled DM, increasing Lantus to 25 units bid and premeal Novolog 1:6, sliding scale insulin to high dose  -hold Metformin    Recent COVID  -diagnosed 2 weeks ago  -treated with Paxlovid  -off isolation    H/o latent TB, not active now  Anxiety  Depression  CAD, s/p stents, no cp  HTN  GERD  Anemia, microcytic   Gout  --resume PTA meds after pharmacy review          Diet: Combination Diet Moderate Consistent Carb (60 g CHO per Meal) Diet    DVT Prophylaxis: Enoxaparin (Lovenox) SQ  Collins Catheter: Not present  Lines: None     Cardiac Monitoring: None  Code Status: Full Code      Clinically Significant Risk Factors Present on Admission                # Drug Induced Platelet Defect: home medication list includes an antiplatelet medication   # Hypertension: Noted on problem list        # DMII: A1C = 7.3 % (Ref range: <5.7 %) within past 6 months        # COPD: noted on problem list        Disposition Plan     Medically Ready for Discharge: Anticipated in 2-4 Days    Barrier: sob, wheezing, cough and uncontrolled DM         Trinh Gutierrez MD  Hospitalist Service  [facility]  Securely message with [employer] (more info)  Text page via [employer] Paging/[employer]    ______________________________________________________________________    Interval History   Coughing and worse glucose last night, no fever, no cp, no new complaints    Physical Exam   Vital Signs: Temp: 97.7  F (36.5  C) Temp src: Oral BP: 133/76 Pulse: 100   Resp: 20 SpO2: 96 % O2 Device: None (Room air)    Weight: 127 lbs 0 oz    General.  Awake alert oriented in resp distress.  HEENT.  Pupils equal round react to light, anicteric, EOM intact.  Neck supple no JVD.  CVS regular rhythm no murmur gallops.  Lungs.  Coarse and wheezing to auscultation bilateral   Abdomen.  Soft nontender bowel sounds present.  Extremities.  No edema no calf tenderness.  Neurological.  Awake and alert. No focal deficit.  Skin no rash. No pallor.  Psych. Normal mood.      Medical Decision Making       47 MINUTES SPENT BY ME on the date of service doing chart review, history, exam, documentation & further activities per the note.      Data     I have personally reviewed the following data over the past 24 hrs:    TSH: N/A T4: N/A A1C: N/A       Imaging results reviewed over the past 24 hrs:   No results found for this or any previous visit (from the past 24 hour(s)).

## 2024-09-27 NOTE — PLAN OF CARE
Problem: Adult Inpatient Plan of Care  Goal: Plan of Care Review  Description: The Plan of Care Review/Shift note should be completed every shift.  The Outcome Evaluation is a brief statement about your assessment that the patient is improving, declining, or no change.  This information will be displayed automatically on your shift  note.  Outcome: Progressing   Goal Outcome Evaluation:  Pt assessed and educated using Khmer . Pt is alert and oriented x4. VSS-she is on RA. Lungs are wheezy and coarse. She has a frequent loose sounding cough-mostly nonproductive and a headache. Medicated with tylenol and robitussin with codiene in am and tessalon perls with tylenol in afternoon. Pt is appears to be sleeping at this time. Family visited and talked to Hospitalist Dr Gutierrez. Pt tolerated diabetic diet. Up to bathroom voided and had bm. Home meds restarted.

## 2024-09-27 NOTE — TREATMENT PLAN
[facility]AT Treatment Plan    Patient Score: 8  Patient Acuity: 4    Clinical Indication for Therapy:  COPD    Therapy Ordered: Duoneb Q6 hrs    Assessment Summary: Pt is on room air; SpO2 96%.  ; RR 20.  Breath sounds coarse expiratory wheezes. Will maintain nebs as ordered for acute exacerbation and reassess min 72 hours.     Jose Carlos France, RT  9/26/2024

## 2024-09-27 NOTE — UTILIZATION REVIEW
Admission Status; Secondary Review Determination   Under the authority of the Utilization Management Committee, the utilization review process indicated a secondary review on the above patient. The review outcome is based on review of the medical records, discussions with staff, and applying clinical experience noted on the date of the review.     (x) Inpatient Status Appropriate - This patient's medical care is consistent with medical management for inpatient care and reasonable inpatient medical practice.   RATIONALE FOR DETERMINATION   53 yo female with significant PMH of COPD and recent COVID infection who presents to the ED with increased SOB despite restarting po prednisone taper from Pulmonary clinic day prior to admission.  Still with tachypnea, wheezing and significant dyspnea with min activity to the bathroom.  Starting on IV steroids today d/t worsening respiratory symptoms.    At the time of admission with the information available to the attending physician more than 2 nights Hospital complex care was anticipated, based on patient risk of adverse outcome if treated as outpatient and complex care required. Inpatient admission is appropriate based on the Medicare guidelines.   The information on this document is developed by the utilization review team in order for the business office to ensure compliance. This only denotes the appropriateness of proper admission status and does not reflect the quality of care rendered.   The definitions of Inpatient Status and Observation Status used in making the determination above are those provided in the CMS Coverage Manual, Chapter 1 and Chapter 6, section 70.4.     Sincerely,     Lindsey Harvey, DO  Utilization Review  Physician Advisor

## 2024-09-28 LAB
ATRIAL RATE - MUSE: 88 BPM
DIASTOLIC BLOOD PRESSURE - MUSE: 78 MMHG
GLUCOSE BLDC GLUCOMTR-MCNC: 120 MG/DL (ref 70–99)
GLUCOSE BLDC GLUCOMTR-MCNC: 277 MG/DL (ref 70–99)
GLUCOSE BLDC GLUCOMTR-MCNC: 311 MG/DL (ref 70–99)
GLUCOSE BLDC GLUCOMTR-MCNC: 359 MG/DL (ref 70–99)
GLUCOSE BLDC GLUCOMTR-MCNC: 373 MG/DL (ref 70–99)
GLUCOSE BLDC GLUCOMTR-MCNC: 385 MG/DL (ref 70–99)
INTERPRETATION ECG - MUSE: NORMAL
P AXIS - MUSE: 49 DEGREES
PR INTERVAL - MUSE: 148 MS
QRS DURATION - MUSE: 78 MS
QT - MUSE: 378 MS
QTC - MUSE: 457 MS
R AXIS - MUSE: -47 DEGREES
SYSTOLIC BLOOD PRESSURE - MUSE: 125 MMHG
T AXIS - MUSE: 47 DEGREES
VENTRICULAR RATE- MUSE: 88 BPM

## 2024-09-28 PROCEDURE — 250N000009 HC RX 250: Performed by: INTERNAL MEDICINE

## 2024-09-28 PROCEDURE — 99232 SBSQ HOSP IP/OBS MODERATE 35: CPT | Performed by: INTERNAL MEDICINE

## 2024-09-28 PROCEDURE — 120N000001 HC R&B MED SURG/OB

## 2024-09-28 PROCEDURE — 94640 AIRWAY INHALATION TREATMENT: CPT | Mod: 76

## 2024-09-28 PROCEDURE — 250N000011 HC RX IP 250 OP 636: Performed by: INTERNAL MEDICINE

## 2024-09-28 PROCEDURE — 250N000013 HC RX MED GY IP 250 OP 250 PS 637: Performed by: INTERNAL MEDICINE

## 2024-09-28 PROCEDURE — 99254 IP/OBS CNSLTJ NEW/EST MOD 60: CPT | Performed by: INTERNAL MEDICINE

## 2024-09-28 PROCEDURE — 250N000013 HC RX MED GY IP 250 OP 250 PS 637: Performed by: FAMILY MEDICINE

## 2024-09-28 PROCEDURE — 250N000012 HC RX MED GY IP 250 OP 636 PS 637: Performed by: INTERNAL MEDICINE

## 2024-09-28 PROCEDURE — 94640 AIRWAY INHALATION TREATMENT: CPT

## 2024-09-28 PROCEDURE — 999N000157 HC STATISTIC RCP TIME EA 10 MIN

## 2024-09-28 RX ORDER — PREDNISONE 20 MG/1
40 TABLET ORAL DAILY
Status: DISCONTINUED | OUTPATIENT
Start: 2024-09-28 | End: 2024-09-28

## 2024-09-28 RX ORDER — METHYLPREDNISOLONE SODIUM SUCCINATE 40 MG/ML
40 INJECTION, POWDER, LYOPHILIZED, FOR SOLUTION INTRAMUSCULAR; INTRAVENOUS EVERY 8 HOURS
Status: DISCONTINUED | OUTPATIENT
Start: 2024-09-28 | End: 2024-10-01 | Stop reason: HOSPADM

## 2024-09-28 RX ADMIN — SUCRALFATE 1 G: 1 SUSPENSION ORAL at 13:58

## 2024-09-28 RX ADMIN — POLYETHYLENE GLYCOL 3350 17 G: 17 POWDER, FOR SOLUTION ORAL at 08:44

## 2024-09-28 RX ADMIN — DICLOFENAC SODIUM 4 G: 10 GEL TOPICAL at 16:01

## 2024-09-28 RX ADMIN — GABAPENTIN 600 MG: 300 CAPSULE ORAL at 13:58

## 2024-09-28 RX ADMIN — PREDNISONE 40 MG: 20 TABLET ORAL at 08:47

## 2024-09-28 RX ADMIN — ASPIRIN 81 MG: 81 TABLET, COATED ORAL at 08:47

## 2024-09-28 RX ADMIN — ACETAMINOPHEN 650 MG: 325 TABLET ORAL at 00:29

## 2024-09-28 RX ADMIN — SUCRALFATE 1 G: 1 SUSPENSION ORAL at 17:57

## 2024-09-28 RX ADMIN — DOXYCYCLINE HYCLATE 100 MG: 100 CAPSULE ORAL at 08:47

## 2024-09-28 RX ADMIN — CHOLECALCIFEROL (VITAMIN D3) 10 MCG (400 UNIT) TABLET 400 UNITS: at 08:47

## 2024-09-28 RX ADMIN — ENOXAPARIN SODIUM 40 MG: 40 INJECTION SUBCUTANEOUS at 17:57

## 2024-09-28 RX ADMIN — METOPROLOL TARTRATE 25 MG: 25 TABLET, FILM COATED ORAL at 08:47

## 2024-09-28 RX ADMIN — DICLOFENAC SODIUM 4 G: 10 GEL TOPICAL at 08:46

## 2024-09-28 RX ADMIN — GUAIFENESIN AND CODEINE PHOSPHATE 5 ML: 100; 10 SOLUTION ORAL at 12:42

## 2024-09-28 RX ADMIN — GUAIFENESIN 1200 MG: 600 TABLET ORAL at 08:47

## 2024-09-28 RX ADMIN — GUAIFENESIN AND CODEINE PHOSPHATE 5 ML: 100; 10 SOLUTION ORAL at 12:41

## 2024-09-28 RX ADMIN — Medication 325 MG: at 09:56

## 2024-09-28 RX ADMIN — IPRATROPIUM BROMIDE AND ALBUTEROL SULFATE 3 ML: .5; 3 SOLUTION RESPIRATORY (INHALATION) at 07:50

## 2024-09-28 RX ADMIN — IPRATROPIUM BROMIDE AND ALBUTEROL SULFATE 3 ML: .5; 3 SOLUTION RESPIRATORY (INHALATION) at 13:54

## 2024-09-28 RX ADMIN — HYDROCHLOROTHIAZIDE 12.5 MG: 12.5 CAPSULE ORAL at 08:47

## 2024-09-28 RX ADMIN — ACETAMINOPHEN 650 MG: 325 TABLET ORAL at 15:59

## 2024-09-28 RX ADMIN — COLCHICINE 0.6 MG: 0.6 TABLET, FILM COATED ORAL at 08:48

## 2024-09-28 RX ADMIN — MONTELUKAST 10 MG: 10 TABLET, FILM COATED ORAL at 20:52

## 2024-09-28 RX ADMIN — GUAIFENESIN 1200 MG: 600 TABLET ORAL at 20:52

## 2024-09-28 RX ADMIN — GUAIFENESIN AND CODEINE PHOSPHATE 5 ML: 100; 10 SOLUTION ORAL at 00:29

## 2024-09-28 RX ADMIN — SUCRALFATE 1 G: 1 SUSPENSION ORAL at 09:56

## 2024-09-28 RX ADMIN — GABAPENTIN 600 MG: 300 CAPSULE ORAL at 08:48

## 2024-09-28 RX ADMIN — BENZONATATE 100 MG: 100 CAPSULE ORAL at 09:56

## 2024-09-28 RX ADMIN — DICLOFENAC SODIUM 4 G: 10 GEL TOPICAL at 20:51

## 2024-09-28 RX ADMIN — PANTOPRAZOLE SODIUM 40 MG: 40 TABLET, DELAYED RELEASE ORAL at 06:32

## 2024-09-28 RX ADMIN — AMITRIPTYLINE HYDROCHLORIDE 50 MG: 25 TABLET, FILM COATED ORAL at 20:50

## 2024-09-28 RX ADMIN — IPRATROPIUM BROMIDE AND ALBUTEROL SULFATE 3 ML: .5; 3 SOLUTION RESPIRATORY (INHALATION) at 19:10

## 2024-09-28 RX ADMIN — METHYLPREDNISOLONE SODIUM SUCCINATE 40 MG: 40 INJECTION, POWDER, FOR SOLUTION INTRAMUSCULAR; INTRAVENOUS at 17:57

## 2024-09-28 RX ADMIN — SUCRALFATE 1 G: 1 SUSPENSION ORAL at 21:00

## 2024-09-28 RX ADMIN — METOPROLOL TARTRATE 25 MG: 25 TABLET, FILM COATED ORAL at 20:52

## 2024-09-28 RX ADMIN — ATORVASTATIN CALCIUM 80 MG: 40 TABLET, FILM COATED ORAL at 08:47

## 2024-09-28 RX ADMIN — GABAPENTIN 600 MG: 300 CAPSULE ORAL at 20:51

## 2024-09-28 RX ADMIN — IPRATROPIUM BROMIDE AND ALBUTEROL SULFATE 3 ML: .5; 3 SOLUTION RESPIRATORY (INHALATION) at 02:49

## 2024-09-28 RX ADMIN — DOXYCYCLINE HYCLATE 100 MG: 100 CAPSULE ORAL at 20:52

## 2024-09-28 RX ADMIN — DICLOFENAC SODIUM 4 G: 10 GEL TOPICAL at 13:58

## 2024-09-28 RX ADMIN — ACETAMINOPHEN 650 MG: 325 TABLET ORAL at 08:47

## 2024-09-28 ASSESSMENT — ACTIVITIES OF DAILY LIVING (ADL)
ADLS_ACUITY_SCORE: 21
ADLS_ACUITY_SCORE: 24
ADLS_ACUITY_SCORE: 22
ADLS_ACUITY_SCORE: 22
ADLS_ACUITY_SCORE: 21
ADLS_ACUITY_SCORE: 22
ADLS_ACUITY_SCORE: 21
ADLS_ACUITY_SCORE: 21
ADLS_ACUITY_SCORE: 22
ADLS_ACUITY_SCORE: 21
ADLS_ACUITY_SCORE: 22
ADLS_ACUITY_SCORE: 21
ADLS_ACUITY_SCORE: 22
ADLS_ACUITY_SCORE: 21
ADLS_ACUITY_SCORE: 24

## 2024-09-28 NOTE — CONSULTS
PULMONARY MEDICINE CONSULT  9/28/2024    Admit Date: 9/26/2024  CODE: Full Code    Reason for Consult: asthma exacerbation    Assessment/Plan:   54 year old female former smoker with a history of severe persistent asthma refractory to multiple attempted therapies, presented on 9/26 with two weeks of persistent cough and wheeizng c/w asthma exacerbation.    Severe persistent asthma: Prior elevated IgE up to 403, not checked recently. AEC only up to 300 cells/uL recently. Has had poor control outpatient despite high-dose triple-therapy, multiple biologics most recently tezepelumab, has declined referral to [facility] or [facility]. Now with another exacerbation. Chest CT fairly unrevealing except for some airway mucus. Wheezing on exam but stable work of breathing.    Plan:  - go back to IV steroids  - continue scheduled nebulized ipratropium-albuterol  - continue montelukast  - patient has previously declined to travel for second opinion at an asthma center  - with failure of outpatient triple-therapy, LTRA and biologic, should consider bronchial thermoplasty  - follow-up with Dr. Duong in December, will get her in to see NP sooner  - will follow    Chaz Ho MD  Pulmonary and Critical Care Medicine  [facility]  Vocera  Office [phone]  he/him                                                                                                                                                      CCx: severe persistent asthma with exacerbation    HPI: 54 year old female former smoker with a history of severe persistent asthma refractory to multiple attempted therapies, now in with asthma exacerbation. Two weeks of dry cough and wheezing, not improved with outpatient prednisone. Frequent exacerbations, on tezepelumab, tiotropium, fluticasone-vilanterol, montelukast. She cannot identify triggers. Prior discussions about opinion from asthma research center but she has not wanted to go to [facility], [facility].   Ad referred her to see Elsie Main at [facility] but she has not gone.                                                                                                                                                        Past Medical History:  former smoker with a history of severe persistent asthma refractory to multiple attempted therapies    Past Surgical History  Past Surgical History:   Procedure Laterality Date    CORONARY STENT PLACEMENT  2018    CV CORONARY ANGIOGRAM N/A 1/25/2018    Procedure: Coronary Angiogram;  Surgeon: Angelo Serrano MD;  Location: [facility] Cath Lab;  Service:     CV CORONARY ANGIOGRAM N/A 5/5/2022    Procedure: Coronary Angiogram;  Surgeon: Irwin Cano MD;  Location: [facility] CATH LAB CV    CV CORONARY ANGIOGRAM  5/5/2022    Procedure: ;  Surgeon: Irwin Cano MD;  Location: [facility] CV    SC ESOPHAGOGASTRODUODENOSCOPY TRANSORAL DIAGNOSTIC N/A 12/10/2018    Procedure: ESOPHAGOGASTRODUODENOSCOPY (EGD);  Surgeon: Eddie Renteria MD;  Location: [facility];  Service: Gastroenterology    SC ESOPHAGOGASTRODUODENOSCOPY TRANSORAL DIAGNOSTIC N/A 12/3/2020    Procedure: ESOPHAGOGASTRODUODENOSCOPY (EGD) with biospies ;  Surgeon: Avi Crow MD;  Location: [facility];  Service: Gastroenterology    [facility] [place] COLONOSCOPY W/WO BRUSH/WASH N/A 12/10/2018    Procedure: COLONOSCOPY with polypectomy using biopsy forceps;  Surgeon: Eddie Renteria MD;  Location: [facility];  Service: Gastroenterology       Allergies:  No Known Allergies    PTA medications:  Facility-Administered Medications Prior to Admission   Medication Dose Route Frequency Provider Last Rate Last Admin    tezepelumab-ekko (TEZSPIRE) injection 210 mg  210 mg Subcutaneous q28 days Elizabeth Marie MD   210 mg at 03/28/24 1039     Medications Prior to Admission   Medication Sig Dispense Refill Last Dose    acetaminophen (TYLENOL) 500 MG tablet TAKE 1 PILL BY MOUTH EVERY 6 HOURS AS  NEEDED FOR PAIN 100 tablet 10     acetaminophen-codeine (TYLENOL #3) 300-30 MG per tablet Take 1 tablet by mouth every 6 hours as needed for severe pain 30 tablet 0     albuterol (PROAIR HFA/PROVENTIL HFA/VENTOLIN HFA) 108 (90 Base) MCG/ACT inhaler Inhale 2 puffs into the lungs every 4 hours as needed for shortness of breath 18 g 11     albuterol (PROVENTIL) (2.5 MG/3ML) 0.083% neb solution Take 1 vial (2.5 mg) by nebulization every 6 hours as needed for shortness of breath, wheezing or cough 360 mL 11     amitriptyline (ELAVIL) 50 MG tablet Take 1 tablet (50 mg) by mouth at bedtime 90 tablet 1 9/25/2024 at hs    aspirin 81 MG EC tablet Take 81 mg by mouth daily.   9/25/2024 at am    atorvastatin (LIPITOR) 80 MG tablet Take 1 tablet (80 mg) by mouth daily 90 tablet 3 9/25/2024 at PM    calcium carbonate (TUMS) 500 MG chewable tablet Take 1 chew tab by mouth 2 times daily as needed for heartburn.       colchicine (COLCRYS) 0.6 MG tablet Take 1 tablet (0.6 mg) by mouth daily 90 tablet 3 9/25/2024 at am    Continuous Blood Gluc Sensor (FREESTYLE MONICA 2 SENSOR) [facility] 1 EACH EVERY 14 DAYS USE 1 SENSOR EVERY 14 DAYS. USE TO READ BLOOD SUGARS PER 'S INSTRUCTIONS. 2 each 11     diclofenac (VOLTAREN) 1 % topical gel Apply 4 g topically 4 times daily 350 g 3 9/25/2024    FEROSUL 325 (65 Fe) MG tablet TAKE 1 TABLET (325 MG) BY MOUTH DAILY (WITH BREAKFAST) 30 tablet 0 9/25/2024 at am    fluticasone-vilanterol (BREO ELLIPTA) 200-25 MCG/ACT inhaler Inhale 1 puff into the lungs daily 60 each 11 9/26/2024 at am    gabapentin (NEURONTIN) 600 MG tablet TAKE 1 PILL (600 MG) BY MOUTH 3 TIMES DAILY 90 tablet 3 9/25/2024 at pm    hydrochlorothiazide (MICROZIDE) 12.5 MG capsule Take 1 capsule (12.5 mg) by mouth every morning 90 capsule 3 9/25/2024 at am    ibuprofen (ADVIL/MOTRIN) 200 MG tablet Take 2 tablets (400 mg) by mouth every 8 hours as needed for pain. 60 tablet 0     insulin aspart (NOVOLOG PEN) 100 UNIT/ML pen Inject  16 Units subcutaneously 2 times daily (before meals). 45 mL 3 9/25/2024 at pm    insulin glargine (LANTUS PEN) 100 UNIT/ML pen Inject 40 Units subcutaneously every morning. 45 mL 3 9/25/2024 at am    ipratropium - albuterol 0.5 mg/2.5 mg/3 mL (DUONEB) 0.5-2.5 (3) MG/3ML neb solution Take 1 vial (3 mLs) by nebulization every 6 hours as needed for shortness of breath, wheezing or cough. 180 mL 3     Lidocaine (LIDOCARE) 4 % Patch Place 1 patch onto the skin every 24 hours To prevent lidocaine toxicity, patient should be patch free for 12 hrs daily. 30 patch 1 9/25/2024    metFORMIN (GLUCOPHAGE XR) 500 MG 24 hr tablet Take 2 tablets (1,000 mg) by mouth 2 times daily (with meals) 360 tablet 3 9/25/2024 at pm    metoprolol tartrate (LOPRESSOR) 25 MG tablet TAKE 1 TABLET (25 MG TOTAL) BY MOUTH 2 (TWO) TIMES A DAY FOR BLOOD PRESSURE 180 tablet 3 9/25/2024 at pm    montelukast (SINGULAIR) 10 MG tablet Take 1 tablet (10 mg) by mouth at bedtime 30 tablet 11 9/25/2024 at hs    NEXIUM 40 MG DR capsule Take 40 mg by mouth every morning (before breakfast).   9/25/2024 at am    polyethylene glycol (MIRALAX) 17 GM/Dose powder Take 17 g (1 Capful) by mouth daily 238 g 1 9/25/2024 at am    predniSONE (DELTASONE) 10 MG tablet Take 4 tablets (40 mg) by mouth daily for 4 days, THEN 3 tablets (30 mg) daily for 4 days, THEN 2 tablets (20 mg) daily for 4 days, THEN 1 tablet (10 mg) daily for 4 days. 40 tablet 0 9/25/2024 at am    sennosides (SENOKOT) 8.6 MG tablet Take 1 tablet by mouth daily as needed for constipation. 30 tablet 3     SPIRIVA RESPIMAT 2.5 MCG/ACT inhaler INHALE 2 PUFFS INTO THE LUNGS DAILY 4 g 0 9/26/2024 at am    sucralfate (CARAFATE) 1 GM/10ML suspension Take 10 mLs (1 g) by mouth 4 times daily. 3600 mL 1 9/25/2024 at am    vitamin D3 (CHOLECALCIFEROL) 10 MCG (400 UNIT) capsule Take 1 capsule (400 Units) by mouth daily. 90 capsule 3 9/25/2024 at am    insulin pen needle (32G X 4 MM) 32G X 4 MM miscellaneous Use 3 pen  needles daily or as directed. 300 each 4        Family Hx:  Family History   Problem Relation Age of Onset    Other - See Comments Mother          of an intestinal problem    Ulcers Father          of gastritis    Breast Cancer No family hx of     Ovarian Cancer No family hx of     Colon Cancer No family hx of        Social Hx:  Social History     Socioeconomic History    Marital status:      Spouse name: Not on file    Number of children: Not on file    Years of education: Not on file    Highest education level: Not on file   Occupational History    Not on file   Tobacco Use    Smoking status: Former     Current packs/day: 0.00     Average packs/day: 1 pack/day for 30.0 years (30.0 ttl pk-yrs)     Types: Cigarettes     Start date: 1973     Quit date: 2003     Years since quittin.7     Passive exposure: Never    Smokeless tobacco: Never    Tobacco comments:     No passive exposure   Vaping Use    Vaping status: Never Used   Substance and Sexual Activity    Alcohol use: No    Drug use: No    Sexual activity: Yes     Partners: Male   Other Topics Concern    Parent/sibling w/ CABG, MI or angioplasty before 65F 55M? Not Asked   Social History Narrative    2017 The patient lives with her daughter-in-law (who is present), , son, and 2 grandchildren (total of 6 people). Immigrant.     Social Determinants of Health     Financial Resource Strain: Low Risk  (2/15/2024)    Financial Resource Strain     Within the past 12 months, have you or your family members you live with been unable to get utilities (heat, electricity) when it was really needed?: No   Food Insecurity: Low Risk  (2/15/2024)    Food Insecurity     Within the past 12 months, did you worry that your food would run out before you got money to buy more?: No     Within the past 12 months, did the food you bought just not last and you didn t have money to get more?: No   Transportation Needs: Low Risk  (2/15/2024)     Transportation Needs     Within the past 12 months, has lack of transportation kept you from medical appointments, getting your medicines, non-medical meetings or appointments, work, or from getting things that you need?: No   Physical Activity: Not on file   Stress: Not on file   Social Connections: Not on file   Interpersonal Safety: Unknown (5/23/2024)    Interpersonal Safety     Do you feel physically and emotionally safe where you currently live?: Patient unable to answer     Within the past 12 months, have you been hit, slapped, kicked or otherwise physically hurt by someone?: Patient unable to answer     Within the past 12 months, have you been humiliated or emotionally abused in other ways by your partner or ex-partner?: Patient unable to answer   Housing Stability: Low Risk  (2/15/2024)    Housing Stability     Do you have housing? : Yes     Are you worried about losing your housing?: No       Exam/Data:     Vitals  /70 (BP Location: Left arm)   Pulse 80   Temp 98.2  F (36.8  C) (Oral)   Resp 19   Ht 1.524 m (5')   Wt 57.6 kg (127 lb)   SpO2 94%   BMI 24.80 kg/m       I/O last 3 completed shifts:  In: 1080 [P.O.:1080]  Out: -   Weight change:   [unfilled]  EXAM:  /70 (BP Location: Left arm)   Pulse 80   Temp 98.2  F (36.8  C) (Oral)   Resp 19   Ht 1.524 m (5')   Wt 57.6 kg (127 lb)   SpO2 94%   BMI 24.80 kg/m      Intake/Output last 3 shifts:  I/O last 3 completed shifts:  In: 1080 [P.O.:1080]  Out: -   Intake/Output this shift:  No intake/output data recorded.    Physical Exam  Gen: alert, oriented, no distress  HEENT: NT, no BRIT  CV: RRR, no m/g/r  Resp: bilateral wheezing on exam, stable work of breathing  Abd: soft, nontender, BS+  Skin: no rashes or lesions  Ext: no edema  Neuro: PERRL, nonfocal exam    ROS: A complete 10-system review of systems was obtained and is negative with the exception of what is noted in the history of present illness.    Medications:     Current  Facility-Administered Medications   Medication Dose Route Frequency Provider Last Rate Last Admin     Current Facility-Administered Medications   Medication Dose Route Frequency Provider Last Rate Last Admin    amitriptyline (ELAVIL) tablet 50 mg  50 mg Oral At Bedtime Trinh Gutierrez MD   50 mg at 09/27/24 2223    aspirin EC tablet 81 mg  81 mg Oral Daily Trinh Gutierrez MD   81 mg at 09/28/24 0847    atorvastatin (LIPITOR) tablet 80 mg  80 mg Oral Daily Trinh Gutierrez MD   80 mg at 09/28/24 0847    cholecalciferol (VITAMIN D3) tablet 400 Units  400 Units Oral Daily Trinh Gutierrez MD   400 Units at 09/28/24 0847    colchicine (COLCRYS) tablet 0.6 mg  0.6 mg Oral Daily Trinh Gutierrez MD   0.6 mg at 09/28/24 0848    diclofenac (VOLTAREN) 1 % topical gel 4 g  4 g Topical 4x Daily Trinh Gutierrez MD   4 g at 09/28/24 1601    doxycycline hyclate (VIBRAMYCIN) capsule 100 mg  100 mg Oral BID Trinh Gutierrez MD   100 mg at 09/28/24 0847    enoxaparin ANTICOAGULANT (LOVENOX) injection 40 mg  40 mg Subcutaneous Q24H Trinh Gutierrez MD   40 mg at 09/27/24 1742    ferrous sulfate (FEROSUL) tablet 325 mg  325 mg Oral Daily with breakfast Trinh Gutierrez MD   325 mg at 09/28/24 0956    gabapentin (NEURONTIN) capsule 600 mg  600 mg Oral TID Trinh Gutierrez MD   600 mg at 09/28/24 1358    guaiFENesin (MUCINEX) 12 hr tablet 1,200 mg  1,200 mg Oral BID Trinh Gutierrez MD   1,200 mg at 09/28/24 0847    hydrochlorothiazide (MICROZIDE) capsule 12.5 mg  12.5 mg Oral QAM Trinh Gutierrez MD   12.5 mg at 09/28/24 0847    insulin aspart (NovoLOG) injection (RAPID ACTING)  1-10 Units Subcutaneous TID AC Trinh Gutierrez MD   7 Units at 09/28/24 1242    insulin aspart (NovoLOG) injection (RAPID ACTING)  1-7 Units Subcutaneous At Bedtime Trinh Gutierrez MD   7 Units at 09/27/24 2222    insulin aspart (NovoLOG) injection (RAPID ACTING)   Subcutaneous Daily with breakfast Trinh Gutierrez MD   6 Units at 09/28/24 0843    insulin glargine  (LANTUS PEN) injection 25 Units  25 Units Subcutaneous BID Trinh Gutierrez MD   25 Units at 09/28/24 0849    ipratropium - albuterol 0.5 mg/2.5 mg/3 mL (DUONEB) neb solution 3 mL  3 mL Nebulization Q6H Trinh Gutierrez MD   3 mL at 09/28/24 1354    metoprolol tartrate (LOPRESSOR) tablet 25 mg  25 mg Oral BID Trinh Gutierrez MD   25 mg at 09/28/24 0847    montelukast (SINGULAIR) tablet 10 mg  10 mg Oral At Bedtime Trinh Gutierrez MD   10 mg at 09/27/24 2221    pantoprazole (PROTONIX) EC tablet 40 mg  40 mg Oral QAM AC Trinh Gutierrez MD   40 mg at 09/28/24 0632    polyethylene glycol (MIRALAX) Packet 17 g  17 g Oral Daily Trinh Gutierrez MD   17 g at 09/28/24 0844    predniSONE (DELTASONE) tablet 40 mg  40 mg Oral Daily Trinh Gutierrez MD   40 mg at 09/28/24 0847    sodium chloride (PF) 0.9% PF flush 3 mL  3 mL Intracatheter Q8H Trinh Gutierrez MD   3 mL at 09/28/24 1357    sucralfate (CARAFATE) suspension 1 g  1 g Oral 4x Daily Trinh Gutierrez MD   1 g at 09/28/24 1358         DATA  All laboratory and radiology has been personally reviewed by myself today.  Recent Labs   Lab 09/26/24  0756   WBC 9.6   HGB 12.6   HCT 39.4        Recent Labs   Lab 09/26/24  0756      CO2 23   BUN 8.8   ALKPHOS 120   ALT 45   AST 50*       Imaging:  CT chest (September 2024):  - images directly reviewed, formal interpretation follows:  FINDINGS:   LUNGS AND PLEURA: Symmetric lung inflation. Minimal subpleural scarring in the bases in the posterior sulci and along the right lateral costal pleura towards the lateral sulcus. There is a solid peribronchial vascular nodule in the right lower lobe   superior segment measuring 4-5 mm (series 4, image 100). Smaller, 3 mm nodule with internal calcification along the medial right major fissure (series 4, image 114) is also stable. There is a ovoid calcification along the posterolateral costal pleura of   the right lower lobe, definitively benign. Central airways are patent. There are  are visible secretions within multiple branch subsegmental airways in the basilar segments of both lower lobes. No postobstructive phenomenon. No pleural space abnormality.     MEDIASTINUM: Cardiac chambers are normal in size. No pericardial effusion. Nonaneurysmal thoracic aorta. 2 vessel arch anatomy. Mild to moderate calcified plaque is present in the proximal great vessels, aortic arch and descending thoracic aorta.   Esophagus is decompressed. No lymphadenopathy.     CORONARY ARTERY CALCIFICATION: Previous intervention (stents or CABG).     UPPER ABDOMEN: No actionable findings in the imaged upper abdomen.     MUSCULOSKELETAL: Thoracic vertebra are maintained in height and shape. No fractures or bone lesions.                                                                      IMPRESSION:      1.  No acute lung inflammatory process.  2.  Retained mucus within multiple subsegmental branch airways in the lower lobes but no associated postobstructive phenomenon.  3.  A few small pulmonary nodules are stable largest of which in the right lower lobe superior segment measures 4-5 mm. No specific additional imaging workup or follow-up is necessary per 2017 Fleischner Society guidelines.    Pulmonary Function Testing:  PFT in 2017 with invalid measurements

## 2024-09-28 NOTE — PLAN OF CARE
Problem: Adult Inpatient Plan of Care  Goal: Plan of Care Review  Description: The Plan of Care Review/Shift note should be completed every shift.  The Outcome Evaluation is a brief statement about your assessment that the patient is improving, declining, or no change.  This information will be displayed automatically on your shift  note.  Outcome: Progressing   Goal Outcome Evaluation:  Pt alert and oriented x4-she is afebrile. Lungs sound very coarse-she has a frequent cough -medicated with tessalon mack and later robitussin with codiene with improvement. Appetite is fair to good-blood sugar 311 at lunchtime-coverage given. Pt up to bathroom with 1 assist and cane. Awaiting Pulmonary consult.

## 2024-09-28 NOTE — PROGRESS NOTES
[facility]    Medicine Progress Note - Hospitalist Service    Date of Admission:  9/26/2024    Assessment & Plan   Kulwant Amin is a 54 year old female admitted on 9/26/2024. She has COPD, asthma, recent COVID infection, latent TB. Recent Prednisone and Paxlovid for COVID-19 infection. Came in for worse cough and sob.    Acute on chronic resp distress  Acute COPD exacerbation  -Dyspnea and cough worsening  -wheezing on exam  -chest ct: no acute inflammatory changes;+mucus and nodules  -start on Solumedrol, nebs and Doxycycline  -changed Solumedrol to prednisone  -not improving as expected. Will consult pulm    DM 2 uncontrolled  -hgb A1c 7.3  -PTA: Lantus 40 unit(s) QAM, Novolog 16 units BID  -lantus 20 units BID and sliding scale insulin; premeal 1:10 Novolog. Due to uncontrolled DM, increasing Lantus to 25 units bid and premeal Novolog 1:6, sliding scale insulin to high dose.   -Increase insulin as needed  -hold Metformin    Recent COVID  -diagnosed 2 weeks ago  -treated with Paxlovid  -off isolation    H/o latent TB, not active now  Anxiety  Depression  CAD, s/p stents, no cp  HTN  GERD  Anemia, microcytic   Gout  --resume PTA meds after pharmacy review          Diet: Combination Diet Moderate Consistent Carb (60 g CHO per Meal) Diet    DVT Prophylaxis: Enoxaparin (Lovenox) SQ  Collins Catheter: Not present  Lines: None     Cardiac Monitoring: None  Code Status: Full Code      Clinically Significant Risk Factors                  # Hypertension: Noted on problem list          # DMII: A1C = 7.3 % (Ref range: <5.7 %) within past 6 months, PRESENT ON ADMISSION      # COPD: noted on problem list        Disposition Plan     Medically Ready for Discharge: Anticipated in 2-4 Days    Barrier: persistent wheezing, cough, sob         Trinh Gutierrez MD  Hospitalist Service  [facility]  Securely message with [employer] (more info)  Text page via [employer] Paging/Directory    ______________________________________________________________________    Interval History   Dry cough, significant wheezing, no fever, no cp    Physical Exam   Vital Signs: Temp: 98.2  F (36.8  C) Temp src: Oral BP: 126/70 Pulse: 80   Resp: 19 SpO2: 96 % O2 Device: None (Room air)    Weight: 127 lbs 0 oz    General.  Awake alert oriented not in acute distress.  HEENT.  Pupils equal round react to light, anicteric, EOM intact.  Neck supple no JVD.  CVS regular rhythm no murmur gallops.  Lungs.  B/l wheezing to auscultation bilateral   Abdomen.  Soft nontender bowel sounds present.  Extremities.  No edema no calf tenderness.  Neurological.  Awake and alert. No focal deficit.  Skin no rash. No pallor.  Psych. Normal mood.      Medical Decision Making       45 MINUTES SPENT BY ME on the date of service doing chart review, history, exam, documentation & further activities per the note.      Data         Imaging results reviewed over the past 24 hrs:   No results found for this or any previous visit (from the past 24 hour(s)).

## 2024-09-28 NOTE — PLAN OF CARE
Problem: Adult Inpatient Plan of Care  Goal: Optimal Comfort and Wellbeing  Outcome: Progressing  Intervention: Monitor Pain and Promote Comfort  Recent Flowsheet Documentation  Taken 9/28/2024 0378 by Sharan Talamantes, RN  Pain Management Interventions: medication (see MAR)   Goal Outcome Evaluation:    Solu-medrol given via IV. Still with course and wheezing lung sounds. Patient and family requested a room change, one with more natural lighting and nature views, this was accommodated and she was moved to a new room with these accommodations, she and her family were thankful. Calm and pleasant. Continues to complain of back pain and headaches.

## 2024-09-28 NOTE — PLAN OF CARE
"  Problem: Adult Inpatient Plan of Care  Goal: Plan of Care Review  Description: The Plan of Care Review/Shift note should be completed every shift.  The Outcome Evaluation is a brief statement about your assessment that the patient is improving, declining, or no change.  This information will be displayed automatically on your shift  note.  Outcome: Progressing  Goal: Patient-Specific Goal (Individualized)  Description: You can add care plan individualizations to a care plan. Examples of Individualization might be:  \"Parent requests to be called daily at 9am for status\", \"I have a hard time hearing out of my right ear\", or \"Do not touch me to wake me up as it startles  me\".  Outcome: Progressing  Goal: Absence of Hospital-Acquired Illness or Injury  Outcome: Progressing  Intervention: Identify and Manage Fall Risk  Recent Flowsheet Documentation  Taken 9/28/2024 0029 by Ashley Chauhan RN  Safety Promotion/Fall Prevention:   activity supervised   clutter free environment maintained   lighting adjusted  Intervention: Prevent Skin Injury  Recent Flowsheet Documentation  Taken 9/28/2024 0029 by Ashley Chauhan RN  Body Position: position changed independently  Skin Protection: adhesive use limited  Device Skin Pressure Protection: adhesive use limited  Intervention: Prevent and Manage VTE (Venous Thromboembolism) Risk  Recent Flowsheet Documentation  Taken 9/28/2024 0029 by Ashley Chauhan RN  VTE Prevention/Management: SCDs off (sequential compression devices)  Intervention: Prevent Infection  Recent Flowsheet Documentation  Taken 9/28/2024 0029 by Ashley Chauhan RN  Infection Prevention:   rest/sleep promoted   single patient room provided  Goal: Optimal Comfort and Wellbeing  Outcome: Progressing  Intervention: Monitor Pain and Promote Comfort  Recent Flowsheet Documentation  Taken 9/28/2024 0400 by Ashley Chauhan RN  Pain Management Interventions: emotional support  Taken 9/28/2024 0120 by Gissel" Ashley PAEZ, RN  Pain Management Interventions: medication (see MAR)  Taken 9/28/2024 0029 by Ashley Chauhan, RN  Pain Management Interventions: medication (see MAR)  Goal: Readiness for Transition of Care  Outcome: Progressing   Goal Outcome Evaluation:               Pt a/o with continued cough (robitussin given) and some shortness of breath with activity. Pt on room air. Tylenol given for c/o headache. Will monitor.

## 2024-09-28 NOTE — PLAN OF CARE
"  Problem: Adult Inpatient Plan of Care  Goal: Plan of Care Review  Description: The Plan of Care Review/Shift note should be completed every shift.  The Outcome Evaluation is a brief statement about your assessment that the patient is improving, declining, or no change.  This information will be displayed automatically on your shift  note.  Outcome: Progressing  Goal: Patient-Specific Goal (Individualized)  Description: You can add care plan individualizations to a care plan. Examples of Individualization might be:  \"Parent requests to be called daily at 9am for status\", \"I have a hard time hearing out of my right ear\", or \"Do not touch me to wake me up as it startles  me\".  Outcome: Progressing  Goal: Absence of Hospital-Acquired Illness or Injury  Outcome: Progressing  Intervention: Identify and Manage Fall Risk  Recent Flowsheet Documentation  Taken 9/27/2024 1850 by Skylar Cuevas RN  Safety Promotion/Fall Prevention:   activity supervised   clutter free environment maintained   lighting adjusted  Intervention: Prevent Skin Injury  Recent Flowsheet Documentation  Taken 9/27/2024 1850 by Skylar Cuevas RN  Body Position: position changed independently  Intervention: Prevent and Manage VTE (Venous Thromboembolism) Risk  Recent Flowsheet Documentation  Taken 9/27/2024 1850 by Skylar Cuevas RN  VTE Prevention/Management: SCDs off (sequential compression devices)  Intervention: Prevent Infection  Recent Flowsheet Documentation  Taken 9/27/2024 1850 by Skylar Cuevas RN  Infection Prevention: hand hygiene promoted  Goal: Optimal Comfort and Wellbeing  Outcome: Progressing  Goal: Readiness for Transition of Care  Outcome: Progressing  Intervention: Mutually Develop Transition Plan  Recent Flowsheet Documentation  Taken 9/27/2024 2200 by Skylar Cuevas RN  Equipment Currently Used at Home: cane, straight     Problem: Comorbidity Management  Goal: Maintenance of Asthma Control  Outcome: " Progressing  Intervention: Maintain Asthma Symptom Control  Recent Flowsheet Documentation  Taken 9/27/2024 1850 by Skylar Cuevas RN  Medication Review/Management: medications reviewed  Goal: Blood Glucose Levels Within Targeted Range  Outcome: Progressing  Intervention: Monitor and Manage Glycemia  Recent Flowsheet Documentation  Taken 9/27/2024 1850 by Skylar Cuevas RN  Medication Review/Management: medications reviewed     Problem: COPD (Chronic Obstructive Pulmonary Disease)  Goal: Optimal Chronic Illness Coping  Outcome: Progressing  Goal: Optimal Level of Functional [place]  Outcome: Progressing  Intervention: Optimize Functional Ability  Recent Flowsheet Documentation  Taken 9/27/2024 1850 by Skylar Cuevas RN  Activity Management:   ambulated in room   ambulated to bathroom  Goal: Absence of Infection Signs and Symptoms  Outcome: Progressing  Goal: Improved Oral Intake  Outcome: Progressing  Goal: Effective Oxygenation and Ventilation  Outcome: Progressing  Intervention: Promote Airway Secretion Clearance  Recent Flowsheet Documentation  Taken 9/27/2024 1850 by Skylar Cuevas RN  Cough And Deep Breathing: done independently per patient  Activity Management:   ambulated in room   ambulated to bathroom  Intervention: Optimize Oxygenation and Ventilation  Recent Flowsheet Documentation  Taken 9/27/2024 1850 by Skylar Cuevas RN  Head of Bed (HOB) Positioning: HOB at 20-30 degrees   Goal Outcome Evaluation:         Pt is alert and oriented x4, family member at the bed side to help interpret,  acetaminophen was given for headache and it was effective.  at bedtime, MD notified no change on current order. Pt On RA, VSS and will monitor.

## 2024-09-29 LAB
GLUCOSE BLDC GLUCOMTR-MCNC: 275 MG/DL (ref 70–99)
GLUCOSE BLDC GLUCOMTR-MCNC: 302 MG/DL (ref 70–99)
GLUCOSE BLDC GLUCOMTR-MCNC: 322 MG/DL (ref 70–99)
GLUCOSE BLDC GLUCOMTR-MCNC: 372 MG/DL (ref 70–99)
GLUCOSE BLDC GLUCOMTR-MCNC: 435 MG/DL (ref 70–99)
PLATELET # BLD AUTO: 226 10E3/UL (ref 150–450)

## 2024-09-29 PROCEDURE — 250N000009 HC RX 250: Performed by: INTERNAL MEDICINE

## 2024-09-29 PROCEDURE — 250N000011 HC RX IP 250 OP 636: Performed by: INTERNAL MEDICINE

## 2024-09-29 PROCEDURE — 99233 SBSQ HOSP IP/OBS HIGH 50: CPT | Performed by: INTERNAL MEDICINE

## 2024-09-29 PROCEDURE — 85049 AUTOMATED PLATELET COUNT: CPT | Performed by: INTERNAL MEDICINE

## 2024-09-29 PROCEDURE — 94640 AIRWAY INHALATION TREATMENT: CPT

## 2024-09-29 PROCEDURE — 999N000157 HC STATISTIC RCP TIME EA 10 MIN

## 2024-09-29 PROCEDURE — 36415 COLL VENOUS BLD VENIPUNCTURE: CPT | Performed by: INTERNAL MEDICINE

## 2024-09-29 PROCEDURE — 250N000013 HC RX MED GY IP 250 OP 250 PS 637: Performed by: INTERNAL MEDICINE

## 2024-09-29 PROCEDURE — 999N000156 HC STATISTIC RCP CONSULT EA 30 MIN

## 2024-09-29 PROCEDURE — 99232 SBSQ HOSP IP/OBS MODERATE 35: CPT | Performed by: INTERNAL MEDICINE

## 2024-09-29 PROCEDURE — 120N000001 HC R&B MED SURG/OB

## 2024-09-29 PROCEDURE — 94640 AIRWAY INHALATION TREATMENT: CPT | Mod: 76

## 2024-09-29 RX ORDER — IPRATROPIUM BROMIDE AND ALBUTEROL SULFATE 2.5; .5 MG/3ML; MG/3ML
3 SOLUTION RESPIRATORY (INHALATION)
Status: DISCONTINUED | OUTPATIENT
Start: 2024-09-29 | End: 2024-10-01 | Stop reason: HOSPADM

## 2024-09-29 RX ADMIN — SUCRALFATE 1 G: 1 SUSPENSION ORAL at 14:33

## 2024-09-29 RX ADMIN — GUAIFENESIN 1200 MG: 600 TABLET ORAL at 09:10

## 2024-09-29 RX ADMIN — GABAPENTIN 600 MG: 300 CAPSULE ORAL at 21:18

## 2024-09-29 RX ADMIN — SUCRALFATE 1 G: 1 SUSPENSION ORAL at 21:23

## 2024-09-29 RX ADMIN — IPRATROPIUM BROMIDE AND ALBUTEROL SULFATE 3 ML: .5; 3 SOLUTION RESPIRATORY (INHALATION) at 07:41

## 2024-09-29 RX ADMIN — GUAIFENESIN 1200 MG: 600 TABLET ORAL at 21:17

## 2024-09-29 RX ADMIN — ENOXAPARIN SODIUM 40 MG: 40 INJECTION SUBCUTANEOUS at 17:31

## 2024-09-29 RX ADMIN — GUAIFENESIN AND CODEINE PHOSPHATE 5 ML: 100; 10 SOLUTION ORAL at 14:33

## 2024-09-29 RX ADMIN — IPRATROPIUM BROMIDE AND ALBUTEROL SULFATE 3 ML: .5; 3 SOLUTION RESPIRATORY (INHALATION) at 05:17

## 2024-09-29 RX ADMIN — CHOLECALCIFEROL (VITAMIN D3) 10 MCG (400 UNIT) TABLET 400 UNITS: at 09:11

## 2024-09-29 RX ADMIN — GUAIFENESIN AND CODEINE PHOSPHATE 5 ML: 100; 10 SOLUTION ORAL at 10:07

## 2024-09-29 RX ADMIN — METHYLPREDNISOLONE SODIUM SUCCINATE 40 MG: 40 INJECTION, POWDER, FOR SOLUTION INTRAMUSCULAR; INTRAVENOUS at 00:26

## 2024-09-29 RX ADMIN — METOPROLOL TARTRATE 25 MG: 25 TABLET, FILM COATED ORAL at 09:11

## 2024-09-29 RX ADMIN — DICLOFENAC SODIUM 4 G: 10 GEL TOPICAL at 09:09

## 2024-09-29 RX ADMIN — MONTELUKAST 10 MG: 10 TABLET, FILM COATED ORAL at 21:18

## 2024-09-29 RX ADMIN — DICLOFENAC SODIUM 4 G: 10 GEL TOPICAL at 12:23

## 2024-09-29 RX ADMIN — PANTOPRAZOLE SODIUM 40 MG: 40 TABLET, DELAYED RELEASE ORAL at 06:20

## 2024-09-29 RX ADMIN — POLYETHYLENE GLYCOL 3350 17 G: 17 POWDER, FOR SOLUTION ORAL at 09:11

## 2024-09-29 RX ADMIN — METHYLPREDNISOLONE SODIUM SUCCINATE 40 MG: 40 INJECTION, POWDER, FOR SOLUTION INTRAMUSCULAR; INTRAVENOUS at 17:31

## 2024-09-29 RX ADMIN — ASPIRIN 81 MG: 81 TABLET, COATED ORAL at 09:11

## 2024-09-29 RX ADMIN — METHYLPREDNISOLONE SODIUM SUCCINATE 40 MG: 40 INJECTION, POWDER, FOR SOLUTION INTRAMUSCULAR; INTRAVENOUS at 09:11

## 2024-09-29 RX ADMIN — Medication 325 MG: at 10:08

## 2024-09-29 RX ADMIN — GABAPENTIN 600 MG: 300 CAPSULE ORAL at 14:33

## 2024-09-29 RX ADMIN — METOPROLOL TARTRATE 25 MG: 25 TABLET, FILM COATED ORAL at 21:18

## 2024-09-29 RX ADMIN — IPRATROPIUM BROMIDE AND ALBUTEROL SULFATE 3 ML: .5; 3 SOLUTION RESPIRATORY (INHALATION) at 19:07

## 2024-09-29 RX ADMIN — DOXYCYCLINE HYCLATE 100 MG: 100 CAPSULE ORAL at 21:18

## 2024-09-29 RX ADMIN — ATORVASTATIN CALCIUM 80 MG: 40 TABLET, FILM COATED ORAL at 09:10

## 2024-09-29 RX ADMIN — GABAPENTIN 600 MG: 300 CAPSULE ORAL at 09:10

## 2024-09-29 RX ADMIN — DOXYCYCLINE HYCLATE 100 MG: 100 CAPSULE ORAL at 09:10

## 2024-09-29 RX ADMIN — SUCRALFATE 1 G: 1 SUSPENSION ORAL at 10:08

## 2024-09-29 RX ADMIN — GUAIFENESIN AND CODEINE PHOSPHATE 5 ML: 100; 10 SOLUTION ORAL at 06:20

## 2024-09-29 RX ADMIN — COLCHICINE 0.6 MG: 0.6 TABLET, FILM COATED ORAL at 09:09

## 2024-09-29 RX ADMIN — SUCRALFATE 1 G: 1 SUSPENSION ORAL at 17:31

## 2024-09-29 RX ADMIN — DICLOFENAC SODIUM 4 G: 10 GEL TOPICAL at 17:31

## 2024-09-29 RX ADMIN — HYDROCHLOROTHIAZIDE 12.5 MG: 12.5 CAPSULE ORAL at 09:11

## 2024-09-29 RX ADMIN — IPRATROPIUM BROMIDE AND ALBUTEROL SULFATE 3 ML: .5; 3 SOLUTION RESPIRATORY (INHALATION) at 13:09

## 2024-09-29 RX ADMIN — AMITRIPTYLINE HYDROCHLORIDE 50 MG: 25 TABLET, FILM COATED ORAL at 21:18

## 2024-09-29 RX ADMIN — DICLOFENAC SODIUM 4 G: 10 GEL TOPICAL at 21:20

## 2024-09-29 ASSESSMENT — ACTIVITIES OF DAILY LIVING (ADL)
ADLS_ACUITY_SCORE: 22
ADLS_ACUITY_SCORE: 24
ADLS_ACUITY_SCORE: 22
ADLS_ACUITY_SCORE: 24
ADLS_ACUITY_SCORE: 24
ADLS_ACUITY_SCORE: 22

## 2024-09-29 NOTE — PROGRESS NOTES
[facility]    Medicine Progress Note - Hospitalist Service    Date of Admission:  9/26/2024    Assessment & Plan   Kulwant Amin is a 54 year old female admitted on 9/26/2024. She has COPD, asthma, recent COVID infection, latent TB. Recent Prednisone and Paxlovid for COVID-19 infection. Came in for worse cough and sob.    Acute on chronic resp distress  Acute severe persistent asthma  -Dyspnea and cough   -wheezing on exam  -chest ct: no acute inflammatory changes;+mucus and nodules  -start on Solumedrol, nebs and Doxycycline  - Consulted pulm: iv solumedrol., scheduled nebs, continue montelukast  - patient has previously declined to travel for second opinion at an asthma center  - with failure of outpatient triple-therapy, LTRA and biologic, should consider bronchial thermoplasty    DM 2 uncontrolled  -hgb A1c 7.3  -PTA: Lantus 40 unit(s) QAM, Novolog 16 units BID  -lantus 20 units BID and sliding scale insulin; premeal 1:10 Novolog. Due to uncontrolled DM, increasing Lantus to 25 units bid and premeal Novolog 1:6, sliding scale insulin to high dose.   -Increase insulin as needed  -hold Metformin    Recent COVID  -diagnosed 2 weeks ago  -treated with Paxlovid  -off isolation    H/o latent TB, not active now  Anxiety  Depression  CAD, s/p stents, no cp  HTN  GERD  Anemia, microcytic   Gout  --resume PTA meds after pharmacy review          Diet: Combination Diet Moderate Consistent Carb (60 g CHO per Meal) Diet    DVT Prophylaxis: Enoxaparin (Lovenox) SQ  Collins Catheter: Not present  Lines: None     Cardiac Monitoring: None  Code Status: Full Code      Clinically Significant Risk Factors                  # Hypertension: Noted on problem list          # DMII: A1C = 7.3 % (Ref range: <5.7 %) within past 6 months, PRESENT ON ADMISSION      # COPD: noted on problem list        Disposition Plan     Medically Ready for Discharge: Anticipated in 2-4 Days    Barrier: iv solumedrol, persistent  wheezing; defer to pulm         Trinh Gutierrez MD  Hospitalist Service  [facility]  Securely message with [employer] (more info)  Text page via [employer] Paging/Directory   ______________________________________________________________________    Interval History   Similar sob and wheezing, cough. No fever, no cp.    Addressed pt and family concerns    Physical Exam   Vital Signs: Temp: 98.3  F (36.8  C) Temp src: Oral BP: (!) 167/77 Pulse: 87   Resp: 17 SpO2: 99 % O2 Device: None (Room air)    Weight: 127 lbs 0 oz    General.  Awake alert oriented in resp distress. Frequent dry cough  HEENT.  Pupils equal round react to light, anicteric, EOM intact.  Neck supple no JVD.  CVS regular rhythm no murmur gallops.  Lungs.  B/l wheezing to auscultation   Abdomen.  Soft nontender bowel sounds present.  Extremities.  No edema no calf tenderness.  Neurological.  Awake and alert. No focal deficit.  Skin no rash. No pallor.  Psych. Normal mood.      Medical Decision Making       55 MINUTES SPENT BY ME on the date of service doing chart review, history, exam, documentation & further activities per the note.      Data     I have personally reviewed the following data over the past 24 hrs:    N/A  \   N/A   / 226     N/A N/A N/A /  275 (H)   N/A N/A N/A \       Imaging results reviewed over the past 24 hrs:   No results found for this or any previous visit (from the past 24 hour(s)).

## 2024-09-29 NOTE — PLAN OF CARE
Problem: Adult Inpatient Plan of Care  Goal: Plan of Care Review  Description: The Plan of Care Review/Shift note should be completed every shift.  The Outcome Evaluation is a brief statement about your assessment that the patient is improving, declining, or no change.  This information will be displayed automatically on your shift  note.  Outcome: Progressing   Goal Outcome Evaluation:  Pt is afebrile-lungs have exp wheezes and are very coarse sounding. Medicated in am  and pm with robitussin with codeine. Loose cough improved with this. Up using walker-did better than with cane as pt is weak. Sat in chair for lunch. Tolerating diabetic diet. Insulin doses adjusted for higher blood sugar-302 before lunch.

## 2024-09-29 NOTE — PLAN OF CARE
"  Problem: Adult Inpatient Plan of Care  Goal: Plan of Care Review  Description: The Plan of Care Review/Shift note should be completed every shift.  The Outcome Evaluation is a brief statement about your assessment that the patient is improving, declining, or no change.  This information will be displayed automatically on your shift  note.  Outcome: Progressing  Goal: Patient-Specific Goal (Individualized)  Description: You can add care plan individualizations to a care plan. Examples of Individualization might be:  \"Parent requests to be called daily at 9am for status\", \"I have a hard time hearing out of my right ear\", or \"Do not touch me to wake me up as it startles  me\".  Outcome: Progressing  Goal: Absence of Hospital-Acquired Illness or Injury  Outcome: Progressing  Intervention: Identify and Manage Fall Risk  Recent Flowsheet Documentation  Taken 9/29/2024 0026 by Ashley Chauhan RN  Safety Promotion/Fall Prevention:   activity supervised   clutter free environment maintained   lighting adjusted  Intervention: Prevent Skin Injury  Recent Flowsheet Documentation  Taken 9/29/2024 0026 by Ashley Chauhan RN  Body Position: position changed independently  Skin Protection: adhesive use limited  Device Skin Pressure Protection: adhesive use limited  Intervention: Prevent and Manage VTE (Venous Thromboembolism) Risk  Recent Flowsheet Documentation  Taken 9/29/2024 0026 by Ashley Chauhan RN  VTE Prevention/Management: SCDs off (sequential compression devices)  Intervention: Prevent Infection  Recent Flowsheet Documentation  Taken 9/29/2024 0026 by Ashley Chauhan RN  Infection Prevention:   rest/sleep promoted   single patient room provided  Goal: Optimal Comfort and Wellbeing  Outcome: Progressing  Intervention: Monitor Pain and Promote Comfort  Recent Flowsheet Documentation  Taken 9/29/2024 0026 by Ashley Chauhan RN  Pain Management Interventions: medication offered but refused  Goal: Readiness " for Transition of Care  Outcome: Progressing   Goal Outcome Evaluation:       Uneventful shift. Pt a/o with VSS on room air. Blood sugars continue to be elevated. Pt sleeping between cares.

## 2024-09-29 NOTE — PROGRESS NOTES
PULMONARY MEDICINE PROGRESS NOTE  9/29/2024    Admit Date: 9/26/2024  CODE: Full Code    Reason for Consult: asthma exacerbation    Assessment/Plan:   54 year old female former smoker with a history of severe persistent asthma refractory to multiple attempted therapies, presented on 9/26 with two weeks of persistent cough and wheeizng c/w asthma exacerbation.    Severe persistent asthma: Prior elevated IgE up to 403, not checked recently. AEC only up to 300 cells/uL recently. Has had poor control outpatient despite high-dose triple-therapy, multiple biologics most recently tezepelumab, has declined referral to [place] or [facility]. Now with another exacerbation, still quite obstructed on exam. Chest CT fairly unrevealing except for some airway mucus. Wheezing and prolonged expiratory phase on exam 9/29. Unable to get history or communicate with patient; I called  services twice, requested [facility] , was on hold for prolonged period without answer.    Plan:  - continue IV steroid today, maybe transition to prednisone taper in 1-2 days  - continue scheduled nebulized ipratropium-albuterol  - continue montelukast  - patient has previously declined to travel for second opinion at a tertiary asthma center; Dr. Duong wanted her to see Dr. Main at [facility] who focuses on asthma, but I don't think the patient scheduled that   - with failure of outpatient triple-therapy, LTRA and biologic, should consider bronchial thermoplasty  - follow-up with Dr. Duong in December, will get her in to see NP sooner  - will follow    Chaz Ho MD  Pulmonary and Critical Care Medicine  [facility]  Vocera  Office [phone]  he/him                                                                                                                                                        SUBJECTIVE/INTERVAL HISTORY   Unable to get history or communicate with patient; I called  services  twice, requested [facility] , was on hold for prolonged period without answer. Still quite wheezy.                                                                                                                                                      Exam/Data:     Vitals  BP (!) 167/77 (BP Location: Left arm)   Pulse 87   Temp 98.3  F (36.8  C) (Oral)   Resp 17   Ht 1.524 m (5')   Wt 57.6 kg (127 lb)   SpO2 99%   BMI 24.80 kg/m       I/O last 3 completed shifts:  In: 900 [P.O.:900]  Out: -   Weight change:   [unfilled]  EXAM:  BP (!) 167/77 (BP Location: Left arm)   Pulse 87   Temp 98.3  F (36.8  C) (Oral)   Resp 17   Ht 1.524 m (5')   Wt 57.6 kg (127 lb)   SpO2 99%   BMI 24.80 kg/m      Intake/Output last 3 shifts:  I/O last 3 completed shifts:  In: 900 [P.O.:900]  Out: -   Intake/Output this shift:  No intake/output data recorded.    Physical Exam  Gen: alert, no distress  HEENT: NT, no BRIT  CV: RRR, no m/g/r  Resp: wheezing bilaterally with prolonged expiratory phase  Abd: soft, nontender, BS+  Skin: no rashes or lesions  Ext: no edema  Neuro: PERRL, nonfocal exam    ROS: A complete 10-system review of systems was obtained and is negative with the exception of what is noted in the history of present illness.    Medications:     Current Facility-Administered Medications   Medication Dose Route Frequency Provider Last Rate Last Admin     Current Facility-Administered Medications   Medication Dose Route Frequency Provider Last Rate Last Admin    amitriptyline (ELAVIL) tablet 50 mg  50 mg Oral At Bedtime Trinh Gutierrez MD   50 mg at 09/28/24 2050    aspirin EC tablet 81 mg  81 mg Oral Daily Trinh Gutierrez MD   81 mg at 09/29/24 0911    atorvastatin (LIPITOR) tablet 80 mg  80 mg Oral Daily Trinh Gutierrez MD   80 mg at 09/29/24 0910    cholecalciferol (VITAMIN D3) tablet 400 Units  400 Units Oral Daily Trinh Gutierrez MD   400 Units at 09/29/24 0911    colchicine (COLCRYS) tablet 0.6 mg  0.6 mg  Oral Daily Trinh Gutierrez MD   0.6 mg at 09/29/24 0909    diclofenac (VOLTAREN) 1 % topical gel 4 g  4 g Topical 4x Daily Trinh Gutierrez MD   4 g at 09/29/24 1223    doxycycline hyclate (VIBRAMYCIN) capsule 100 mg  100 mg Oral BID Trinh Gutierrez MD   100 mg at 09/29/24 0910    enoxaparin ANTICOAGULANT (LOVENOX) injection 40 mg  40 mg Subcutaneous Q24H Trinh Gutierrez MD   40 mg at 09/28/24 1757    ferrous sulfate (FEROSUL) tablet 325 mg  325 mg Oral Daily with breakfast Trinh Gutierrez MD   325 mg at 09/29/24 1008    gabapentin (NEURONTIN) capsule 600 mg  600 mg Oral TID Trinh Gutierrez MD   600 mg at 09/29/24 1433    guaiFENesin (MUCINEX) 12 hr tablet 1,200 mg  1,200 mg Oral BID Trinh Gutierrez MD   1,200 mg at 09/29/24 0910    hydrochlorothiazide (MICROZIDE) capsule 12.5 mg  12.5 mg Oral QAM Trinh Gutierrez MD   12.5 mg at 09/29/24 0911    insulin aspart (NovoLOG) injection (RAPID ACTING)  1-10 Units Subcutaneous TID AC Trinh Gutierrez MD   7 Units at 09/29/24 1223    insulin aspart (NovoLOG) injection (RAPID ACTING)  1-7 Units Subcutaneous At Bedtime Trinh Gutierrze MD   7 Units at 09/28/24 2049    insulin aspart (NovoLOG) injection (RAPID ACTING)   Subcutaneous Daily with breakfast Trinh Gutierrez MD   3 Units at 09/29/24 0907    insulin glargine (LANTUS PEN) injection 30 Units  30 Units Subcutaneous BID Trinh Gutierrez MD        ipratropium - albuterol 0.5 mg/2.5 mg/3 mL (DUONEB) neb solution 3 mL  3 mL Nebulization 3 times daily Chaz Ho MD   3 mL at 09/29/24 1309    methylPREDNISolone sodium succinate (SOLU-MEDROL) injection 40 mg  40 mg Intravenous Q8H Chaz Ho MD   40 mg at 09/29/24 0911    metoprolol tartrate (LOPRESSOR) tablet 25 mg  25 mg Oral BID Trinh Gutierrez MD   25 mg at 09/29/24 0911    montelukast (SINGULAIR) tablet 10 mg  10 mg Oral At Bedtime Trinh Gutierrez MD   10 mg at 09/28/24 2052    pantoprazole (PROTONIX) EC tablet 40 mg  [facility] Trin[facility] Gutierrez,  MD   40 mg at 09/29/24 0620    polyethylene glycol (MIRALAX) Packet 17 g  17 g Oral Daily Trinh Gutierrez MD   17 g at 09/29/24 0911    sodium chloride (PF) 0.9% PF flush 3 mL  3 mL Intracatheter Q8H Trinh Gutierrez MD   3 mL at 09/29/24 1223    sucralfate (CARAFATE) suspension 1 g  1 g Oral 4x Daily Trinh Gutierrez MD   1 g at 09/29/24 1433         DATA  All laboratory and radiology has been personally reviewed by myself today.  Recent Labs   Lab 09/29/24  0516 09/26/24  0756   WBC  --  9.6   HGB  --  12.6   HCT  --  39.4    260     Recent Labs   Lab 09/26/24  0756      CO2 23   BUN 8.8   ALKPHOS 120   ALT 45   AST 50*       Imaging:  CT chest (September 2024):  - images directly reviewed, formal interpretation follows:  FINDINGS:   LUNGS AND PLEURA: Symmetric lung inflation. Minimal subpleural scarring in the bases in the posterior sulci and along the right lateral costal pleura towards the lateral sulcus. There is a solid peribronchial vascular nodule in the right lower lobe   superior segment measuring 4-5 mm (series 4, image 100). Smaller, 3 mm nodule with internal calcification along the medial right major fissure (series 4, image 114) is also stable. There is a ovoid calcification along the posterolateral costal pleura of   the right lower lobe, definitively benign. Central airways are patent. There are are visible secretions within multiple branch subsegmental airways in the basilar segments of both lower lobes. No postobstructive phenomenon. No pleural space abnormality.     MEDIASTINUM: Cardiac chambers are normal in size. No pericardial effusion. Nonaneurysmal thoracic aorta. 2 vessel arch anatomy. Mild to moderate calcified plaque is present in the proximal great vessels, aortic arch and descending thoracic aorta.   Esophagus is decompressed. No lymphadenopathy.     CORONARY ARTERY CALCIFICATION: Previous intervention (stents or CABG).     UPPER ABDOMEN: No actionable findings in the imaged  upper abdomen.     MUSCULOSKELETAL: Thoracic vertebra are maintained in height and shape. No fractures or bone lesions.                                                                      IMPRESSION:      1.  No acute lung inflammatory process.  2.  Retained mucus within multiple subsegmental branch airways in the lower lobes but no associated postobstructive phenomenon.  3.  A few small pulmonary nodules are stable largest of which in the right lower lobe superior segment measures 4-5 mm. No specific additional imaging workup or follow-up is necessary per 2017 Fleischner Society guidelines.     Pulmonary Function Testing:  PFT in 2017 with invalid measurements

## 2024-09-29 NOTE — PROVIDER NOTIFICATION
BP (!) 167/77 (BP Location: Left arm)   Pulse 87   Temp 98.3  F (36.8  C) (Oral)   Resp 17   Ht 1.524 m (5')   Wt 57.6 kg (127 lb)   SpO2 99%   BMI 24.80 kg/m       . No Known Allergies       Intake/Output Summary (Last 24 hours) at 9/29/2024 1216  Last data filed at 9/29/2024 0900  Gross per 24 hour   Intake 350 ml   Output --   Net 350 ml      RCAT Treatment Plan    Patient Score: 9  Patient Acuity: 4    Clinical Indication for Therapy: history of bronchospasm    Therapy Ordered: Will change Douneb from Q6 hr to TID. Pt on RA.     Assessment Summary: Coarse breath sounds pre and post tx. Pt on RA. No accessory muscle use. No nasal flaring. No retractions. NPC. Pt h as no c/o.     Abdi Tripp, RT  9/29/2024

## 2024-09-30 LAB
GLUCOSE BLDC GLUCOMTR-MCNC: 222 MG/DL (ref 70–99)
GLUCOSE BLDC GLUCOMTR-MCNC: 257 MG/DL (ref 70–99)
GLUCOSE BLDC GLUCOMTR-MCNC: 298 MG/DL (ref 70–99)
GLUCOSE BLDC GLUCOMTR-MCNC: 315 MG/DL (ref 70–99)
GLUCOSE BLDC GLUCOMTR-MCNC: 379 MG/DL (ref 70–99)
GLUCOSE BLDC GLUCOMTR-MCNC: 393 MG/DL (ref 70–99)

## 2024-09-30 PROCEDURE — 250N000012 HC RX MED GY IP 250 OP 636 PS 637: Performed by: INTERNAL MEDICINE

## 2024-09-30 PROCEDURE — 94640 AIRWAY INHALATION TREATMENT: CPT

## 2024-09-30 PROCEDURE — 250N000013 HC RX MED GY IP 250 OP 250 PS 637: Performed by: INTERNAL MEDICINE

## 2024-09-30 PROCEDURE — 94640 AIRWAY INHALATION TREATMENT: CPT | Mod: 76

## 2024-09-30 PROCEDURE — 250N000011 HC RX IP 250 OP 636: Performed by: INTERNAL MEDICINE

## 2024-09-30 PROCEDURE — 250N000009 HC RX 250: Performed by: INTERNAL MEDICINE

## 2024-09-30 PROCEDURE — 99232 SBSQ HOSP IP/OBS MODERATE 35: CPT | Performed by: INTERNAL MEDICINE

## 2024-09-30 PROCEDURE — 120N000001 HC R&B MED SURG/OB

## 2024-09-30 PROCEDURE — 999N000157 HC STATISTIC RCP TIME EA 10 MIN

## 2024-09-30 RX ADMIN — DOXYCYCLINE HYCLATE 100 MG: 100 CAPSULE ORAL at 08:09

## 2024-09-30 RX ADMIN — CHOLECALCIFEROL (VITAMIN D3) 10 MCG (400 UNIT) TABLET 400 UNITS: at 08:10

## 2024-09-30 RX ADMIN — GUAIFENESIN AND CODEINE PHOSPHATE 5 ML: 100; 10 SOLUTION ORAL at 10:21

## 2024-09-30 RX ADMIN — IPRATROPIUM BROMIDE AND ALBUTEROL SULFATE 3 ML: .5; 3 SOLUTION RESPIRATORY (INHALATION) at 20:16

## 2024-09-30 RX ADMIN — SUCRALFATE 1 G: 1 SUSPENSION ORAL at 10:21

## 2024-09-30 RX ADMIN — GUAIFENESIN 1200 MG: 600 TABLET ORAL at 08:10

## 2024-09-30 RX ADMIN — SUCRALFATE 1 G: 1 SUSPENSION ORAL at 18:04

## 2024-09-30 RX ADMIN — AMITRIPTYLINE HYDROCHLORIDE 50 MG: 25 TABLET, FILM COATED ORAL at 20:22

## 2024-09-30 RX ADMIN — COLCHICINE 0.6 MG: 0.6 TABLET, FILM COATED ORAL at 08:09

## 2024-09-30 RX ADMIN — ACETAMINOPHEN 650 MG: 325 TABLET ORAL at 16:56

## 2024-09-30 RX ADMIN — GABAPENTIN 600 MG: 300 CAPSULE ORAL at 20:22

## 2024-09-30 RX ADMIN — ATORVASTATIN CALCIUM 80 MG: 40 TABLET, FILM COATED ORAL at 08:10

## 2024-09-30 RX ADMIN — GUAIFENESIN AND CODEINE PHOSPHATE 5 ML: 100; 10 SOLUTION ORAL at 14:19

## 2024-09-30 RX ADMIN — SUCRALFATE 1 G: 1 SUSPENSION ORAL at 14:18

## 2024-09-30 RX ADMIN — SUCRALFATE 1 G: 1 SUSPENSION ORAL at 23:00

## 2024-09-30 RX ADMIN — DICLOFENAC SODIUM 4 G: 10 GEL TOPICAL at 12:41

## 2024-09-30 RX ADMIN — METOPROLOL TARTRATE 25 MG: 25 TABLET, FILM COATED ORAL at 08:10

## 2024-09-30 RX ADMIN — METHYLPREDNISOLONE SODIUM SUCCINATE 40 MG: 40 INJECTION, POWDER, FOR SOLUTION INTRAMUSCULAR; INTRAVENOUS at 16:57

## 2024-09-30 RX ADMIN — IPRATROPIUM BROMIDE AND ALBUTEROL SULFATE 3 ML: .5; 3 SOLUTION RESPIRATORY (INHALATION) at 07:09

## 2024-09-30 RX ADMIN — DICLOFENAC SODIUM 4 G: 10 GEL TOPICAL at 16:57

## 2024-09-30 RX ADMIN — ASPIRIN 81 MG: 81 TABLET, COATED ORAL at 08:10

## 2024-09-30 RX ADMIN — DICLOFENAC SODIUM 4 G: 10 GEL TOPICAL at 08:09

## 2024-09-30 RX ADMIN — METHYLPREDNISOLONE SODIUM SUCCINATE 40 MG: 40 INJECTION, POWDER, FOR SOLUTION INTRAMUSCULAR; INTRAVENOUS at 08:10

## 2024-09-30 RX ADMIN — INSULIN GLARGINE 35 UNITS: 100 INJECTION, SOLUTION SUBCUTANEOUS at 20:53

## 2024-09-30 RX ADMIN — ENOXAPARIN SODIUM 40 MG: 40 INJECTION SUBCUTANEOUS at 17:00

## 2024-09-30 RX ADMIN — GUAIFENESIN 1200 MG: 600 TABLET ORAL at 19:44

## 2024-09-30 RX ADMIN — DOXYCYCLINE HYCLATE 100 MG: 100 CAPSULE ORAL at 19:44

## 2024-09-30 RX ADMIN — DICLOFENAC SODIUM 4 G: 10 GEL TOPICAL at 20:22

## 2024-09-30 RX ADMIN — MONTELUKAST 10 MG: 10 TABLET, FILM COATED ORAL at 20:22

## 2024-09-30 RX ADMIN — INSULIN GLARGINE 35 UNITS: 100 INJECTION, SOLUTION SUBCUTANEOUS at 08:15

## 2024-09-30 RX ADMIN — IPRATROPIUM BROMIDE AND ALBUTEROL SULFATE 3 ML: .5; 3 SOLUTION RESPIRATORY (INHALATION) at 13:47

## 2024-09-30 RX ADMIN — GUAIFENESIN AND CODEINE PHOSPHATE 5 ML: 100; 10 SOLUTION ORAL at 01:31

## 2024-09-30 RX ADMIN — POLYETHYLENE GLYCOL 3350 17 G: 17 POWDER, FOR SOLUTION ORAL at 08:06

## 2024-09-30 RX ADMIN — GABAPENTIN 600 MG: 300 CAPSULE ORAL at 08:10

## 2024-09-30 RX ADMIN — METHYLPREDNISOLONE SODIUM SUCCINATE 40 MG: 40 INJECTION, POWDER, FOR SOLUTION INTRAMUSCULAR; INTRAVENOUS at 03:12

## 2024-09-30 RX ADMIN — GABAPENTIN 600 MG: 300 CAPSULE ORAL at 14:19

## 2024-09-30 RX ADMIN — Medication 325 MG: at 10:21

## 2024-09-30 RX ADMIN — METOPROLOL TARTRATE 25 MG: 25 TABLET, FILM COATED ORAL at 20:22

## 2024-09-30 RX ADMIN — PANTOPRAZOLE SODIUM 40 MG: 40 TABLET, DELAYED RELEASE ORAL at 08:09

## 2024-09-30 RX ADMIN — HYDROCHLOROTHIAZIDE 12.5 MG: 12.5 CAPSULE ORAL at 08:11

## 2024-09-30 ASSESSMENT — ACTIVITIES OF DAILY LIVING (ADL)
ADLS_ACUITY_SCORE: 24
ADLS_ACUITY_SCORE: 28
ADLS_ACUITY_SCORE: 24
ADLS_ACUITY_SCORE: 28
ADLS_ACUITY_SCORE: 24
ADLS_ACUITY_SCORE: 28
ADLS_ACUITY_SCORE: 24
ADLS_ACUITY_SCORE: 28
ADLS_ACUITY_SCORE: 24
ADLS_ACUITY_SCORE: 24

## 2024-09-30 NOTE — PLAN OF CARE
"  Problem: Adult Inpatient Plan of Care  Goal: Plan of Care Review  Description: The Plan of Care Review/Shift note should be completed every shift.  The Outcome Evaluation is a brief statement about your assessment that the patient is improving, declining, or no change.  This information will be displayed automatically on your shift  note.  Outcome: Progressing  Goal: Patient-Specific Goal (Individualized)  Description: You can add care plan individualizations to a care plan. Examples of Individualization might be:  \"Parent requests to be called daily at 9am for status\", \"I have a hard time hearing out of my right ear\", or \"Do not touch me to wake me up as it startles  me\".  Outcome: Progressing  Goal: Absence of Hospital-Acquired Illness or Injury  Outcome: Progressing  Intervention: Identify and Manage Fall Risk  Recent Flowsheet Documentation  Taken 9/29/2024 1557 by Skylar Cuevas RN  Safety Promotion/Fall Prevention:   activity supervised   clutter free environment maintained   lighting adjusted  Intervention: Prevent Skin Injury  Recent Flowsheet Documentation  Taken 9/29/2024 1557 by Skylar Cuevas RN  Body Position: position changed independently  Intervention: Prevent and Manage VTE (Venous Thromboembolism) Risk  Recent Flowsheet Documentation  Taken 9/29/2024 1557 by Skylar Cuevas RN  VTE Prevention/Management: SCDs off (sequential compression devices)  Intervention: Prevent Infection  Recent Flowsheet Documentation  Taken 9/29/2024 1557 by Skylar Cuevas RN  Infection Prevention: rest/sleep promoted  Goal: Optimal Comfort and Wellbeing  Outcome: Progressing  Goal: Readiness for Transition of Care  Outcome: Progressing     Problem: Comorbidity Management  Goal: Maintenance of Asthma Control  Outcome: Progressing  Intervention: Maintain Asthma Symptom Control  Recent Flowsheet Documentation  Taken 9/29/2024 1557 by Skylar Cuevas RN  Medication Review/Management: medications " reviewed  Goal: Blood Glucose Levels Within Targeted Range  Outcome: Progressing  Intervention: Monitor and Manage Glycemia  Recent Flowsheet Documentation  Taken 9/29/2024 1557 by Skylar Cuevas RN  Medication Review/Management: medications reviewed     Problem: COPD (Chronic Obstructive Pulmonary Disease)  Goal: Optimal Chronic Illness Coping  Outcome: Progressing  Goal: Optimal Level of Functional [place]  Outcome: Progressing  Intervention: Optimize Functional Ability  Recent Flowsheet Documentation  Taken 9/29/2024 1557 by Skylar Cuevas RN  Activity Management:   ambulated in room   ambulated to bathroom  Goal: Absence of Infection Signs and Symptoms  Outcome: Progressing  Goal: Improved Oral Intake  Outcome: Progressing  Goal: Effective Oxygenation and Ventilation  Outcome: Progressing  Intervention: Promote Airway Secretion Clearance  Recent Flowsheet Documentation  Taken 9/29/2024 1557 by Skylar Cuevas RN  Cough And Deep Breathing: done independently per patient  Activity Management:   ambulated in room   ambulated to bathroom  Intervention: Optimize Oxygenation and Ventilation  Recent Flowsheet Documentation  Taken 9/29/2024 1557 by Skylar Cuevas RN  Head of Bed (HOB) Positioning: HOB at 20-30 degrees   Goal Outcome Evaluation:         Pt is alert and oriented x4, BS at bedtime was 435, MD notified and 3 unit novolog was given once, Pain improved, recheck at 2 am. VSS and will monitor.

## 2024-09-30 NOTE — PLAN OF CARE
Problem: Adult Inpatient Plan of Care  Goal: Plan of Care Review  Description: The Plan of Care Review/Shift note should be completed every shift.  The Outcome Evaluation is a brief statement about your assessment that the patient is improving, declining, or no change.  This information will be displayed automatically on your shift  note.  Outcome: Progressing   Goal Outcome Evaluation:  Pt is afebrile and upper 90s on RA. She stated thru  that she felt much better this am. Lungs have exp wheezes and remain very coarse sounding. She has been up in chair for meals and walked a short distance in gamble. She has been voiding in the bathroom. Reviewed diabetic care with pt and daughter whom she lives with. Robitussin with codeine given in am and pm for loose occ productive cough-cough overall appears not as frequent as a few days ago.

## 2024-09-30 NOTE — PROGRESS NOTES
[facility]    Medicine Progress Note - Hospitalist Service    Date of Admission:  9/26/2024    Assessment & Plan   Kulwant Amin is a 54 year old female admitted on 9/26/2024. She has COPD, asthma, recent COVID infection, latent TB. Recent Prednisone and Paxlovid for COVID-19 infection. Came in for worse cough and sob.    Acute on chronic resp distress  Acute severe persistent asthma  -Dyspnea and cough   -wheezing on exam  -chest ct: no acute inflammatory changes;+mucus and nodules  -start on Solumedrol, nebs and Doxycycline  - Consulted pulm: iv solumedrol., scheduled nebs, continue montelukast  - patient has previously declined to travel for second opinion at an asthma center  - with failure of outpatient triple-therapy, LTRA and biologic, should consider bronchial thermoplasty    DM 2 uncontrolled  -hgb A1c 7.3  -PTA: Lantus 40 unit(s) QAM, Novolog 16 units BID  -lantus 20 units BID and sliding scale insulin; premeal 1:10 Novolog. Due to uncontrolled DM, increasing Lantus to 25 units bid and premeal Novolog 1:6, sliding scale insulin to high dose.   -Increase insulin as needed  -hold Metformin    Recent COVID  -diagnosed 2 weeks ago  -treated with Paxlovid  -off isolation    H/o latent TB, not active now  Anxiety  Depression  CAD, s/p stents, no cp  HTN  GERD  Anemia, microcytic   Gout  --resume PTA meds after pharmacy review          Diet: Combination Diet Moderate Consistent Carb (60 g CHO per Meal) Diet    DVT Prophylaxis: Enoxaparin (Lovenox) SQ  Collins Catheter: Not present  Lines: None     Cardiac Monitoring: None  Code Status: Full Code      Clinically Significant Risk Factors                  # Hypertension: Noted on problem list          # DMII: A1C = 7.3 % (Ref range: <5.7 %) within past 6 months, PRESENT ON ADMISSION      # COPD: noted on problem list        Disposition Plan     Medically Ready for Discharge: Anticipated in 2-4 Days    Barrier: iv solumedrol and still wheezing  a lot, DM not in control         Trinh Gutierrez MD  Hospitalist Service  [facility]  Securely message with [employer] (more info)  Text page via [employer] Paging/Directory   ______________________________________________________________________    Interval History   Feeling a little better, still a lot of wheezing, no f/c, no n/v, no cp    Physical Exam   Vital Signs: Temp: 98  F (36.7  C) Temp src: Oral BP: 134/74 Pulse: 75   Resp: 18 SpO2: 96 % O2 Device: None (Room air)    Weight: 127 lbs 0 oz    General.  Awake alert oriented in resp distress.  HEENT.  Pupils equal round react to light, anicteric, EOM intact.  Neck supple no JVD.  CVS regular rhythm no murmur gallops.  Lungs.  wheezing to auscultation bilateral   Abdomen.  Soft nontender bowel sounds present.  Extremities.  No edema no calf tenderness.  Neurological.  Awake and alert. No focal deficit.  Skin no rash. No pallor.  Psych. Normal mood.      Medical Decision Making       45 MINUTES SPENT BY ME on the date of service doing chart review, history, exam, documentation & further activities per the note.      Data         Imaging results reviewed over the past 24 hrs:   No results found for this or any previous visit (from the past 24 hour(s)).

## 2024-09-30 NOTE — PROGRESS NOTES
PULMONARY MEDICINE PROGRESS NOTE  9/29/2024    Admit Date: 9/26/2024  CODE: Full Code    Reason for Consult: asthma exacerbation    Assessment/Plan:   54 year old female former smoker with a history of severe persistent asthma refractory to multiple attempted therapies, presented on 9/26 with two weeks of persistent cough and wheeizng c/w asthma exacerbation.    Severe persistent asthma: Prior elevated IgE up to 403, not checked recently. AEC only up to 300 cells/uL recently. Has had poor control outpatient despite high-dose triple-therapy, multiple biologics most recently tezepelumab, has declined referral to [facility] or [facility]. Slowly improving but still quite wheezy, although oxygenation is fine on room air.     Plan:  - keep SpO2 94-96%, avoid hyperoxia. Currently on room air  - prednisone taper ordered  - continue scheduled nebulized ipratropium-albuterol  - continue montelukast  - complete course of doxy   - with failure of outpatient triple-therapy, LTRA and biologic, should consider bronchial thermoplasty  - follow-up with Pia Harris CNP in pulmonary clinic on 11/5/2024.    No further recommendations; we'll sign off. Please call with additional questions.     Chaz oH MD  Pulmonary and Critical Care Medicine  [facility]  Vocera  Office [phone]  he/him                                                                                                                                                        SUBJECTIVE/INTERVAL HISTORY   Interview with Kiswahili phone .  On room air.   Still having some SOB. coughing throughout the visit.                                                                                                                                                    Exam/Data:     Vitals  /81 (BP Location: Left arm)   Pulse 79   Temp 98  F (36.7  C) (Oral)   Resp 18   Ht 1.524 m (5')   Wt 57.6 kg (127 lb)   SpO2 97%   BMI 24.80 kg/m       I/O last  3 completed shifts:  In: 1210 [P.O.:1210]  Out: -   Weight change:   [unfilled]  EXAM:  /81 (BP Location: Left arm)   Pulse 79   Temp 98  F (36.7  C) (Oral)   Resp 18   Ht 1.524 m (5')   Wt 57.6 kg (127 lb)   SpO2 97%   BMI 24.80 kg/m      Intake/Output last 3 shifts:  I/O last 3 completed shifts:  In: 1210 [P.O.:1210]  Out: -   Intake/Output this shift:  I/O this shift:  In: 750 [P.O.:750]  Out: -     Physical Exam  Gen: alert, no distress  HEENT: NT, no BRIT  CV: RRR, no m/g/r  Resp: wheezing bilaterally with prolonged expiratory phase  Abd: soft, nontender, BS+  Skin: no rashes or lesions  Ext: no edema  Neuro: PERRL, nonfocal exam    ROS: A complete 10-system review of systems was obtained and is negative with the exception of what is noted in the history of present illness.    Medications:     Current Facility-Administered Medications   Medication Dose Route Frequency Provider Last Rate Last Admin     Current Facility-Administered Medications   Medication Dose Route Frequency Provider Last Rate Last Admin    amitriptyline (ELAVIL) tablet 50 mg  50 mg Oral At Bedtime Trinh Gutierrez MD   50 mg at 09/29/24 2118    aspirin EC tablet 81 mg  81 mg Oral Daily Trinh Gutierrez MD   81 mg at 09/30/24 0810    atorvastatin (LIPITOR) tablet 80 mg  80 mg Oral Daily Trinh Gutierrez MD   80 mg at 09/30/24 0810    cholecalciferol (VITAMIN D3) tablet 400 Units  400 Units Oral Daily Trinh Gutierrez MD   400 Units at 09/30/24 0810    colchicine (COLCRYS) tablet 0.6 mg  0.6 mg Oral Daily Trinh Gutierrez MD   0.6 mg at 09/30/24 0809    diclofenac (VOLTAREN) 1 % topical gel 4 g  4 g Topical 4x Daily Trinh Gutierrez MD   4 g at 09/30/24 1241    doxycycline hyclate (VIBRAMYCIN) capsule 100 mg  100 mg Oral BID Trinh Gutierrez MD   100 mg at 09/30/24 0809    enoxaparin ANTICOAGULANT (LOVENOX) injection 40 mg  40 mg Subcutaneous Q24H Trinh Gutierrez MD   40 mg at 09/29/24 1731    ferrous sulfate (FEROSUL) tablet 325 mg  325  mg Oral Daily with breakfast Trinh Gutierrez MD   325 mg at 09/30/24 1021    gabapentin (NEURONTIN) capsule 600 mg  600 mg Oral TID Trinh Gutierrez MD   600 mg at 09/30/24 0810    guaiFENesin (MUCINEX) 12 hr tablet 1,200 mg  1,200 mg Oral BID Trinh Gutierrez MD   1,200 mg at 09/30/24 0810    hydrochlorothiazide (MICROZIDE) capsule 12.5 mg  12.5 mg Oral QAM Trinh Gutierrez MD   12.5 mg at 09/30/24 0811    insulin aspart (NovoLOG) injection (RAPID ACTING)  1-10 Units Subcutaneous TID AC Trinh Gutierrez MD   4 Units at 09/30/24 1240    insulin aspart (NovoLOG) injection (RAPID ACTING)  1-7 Units Subcutaneous At Bedtime Trinh Gutierrez MD   7 Units at 09/29/24 2121    insulin aspart (NovoLOG) injection (RAPID ACTING)   Subcutaneous Daily with breakfast Trinh Gutierrez MD   10 Units at 09/30/24 0817    insulin glargine (LANTUS PEN) injection 35 Units  35 Units Subcutaneous BID Trinh Gutierrez MD   35 Units at 09/30/24 0815    ipratropium - albuterol 0.5 mg/2.5 mg/3 mL (DUONEB) neb solution 3 mL  3 mL Nebulization 3 times daily Chaz Ho MD   3 mL at 09/30/24 1347    methylPREDNISolone sodium succinate (SOLU-MEDROL) injection 40 mg  40 mg Intravenous Q8H Chaz Ho MD   40 mg at 09/30/24 0810    metoprolol tartrate (LOPRESSOR) tablet 25 mg  25 mg Oral BID Trinh Gutierrze MD   25 mg at 09/30/24 0810    montelukast (SINGULAIR) tablet 10 mg  10 mg Oral At Bedtime Trinh Gutierrez MD   10 mg at 09/29/24 2118    pantoprazole (PROTONIX) EC tablet 40 mg  40 mg Oral QAM AC Trinh Gutierrez MD   40 mg at 09/30/24 0809    polyethylene glycol (MIRALAX) Packet 17 g  17 g Oral Daily Trinh Gutierrez MD   17 g at 09/30/24 0806    sodium chloride (PF) 0.9% PF flush 3 mL  3 mL Intracatheter Q8H Trinh Gutierrez MD   3 mL at 09/30/24 1241    sucralfate (CARAFATE) suspension 1 g  1 g Oral 4x Daily Trinh Gutierrez MD   1 g at 09/30/24 1021         DATA  All laboratory and radiology has been personally reviewed by myself  today.  Recent Labs   Lab 09/29/24  0516 09/26/24  0756   WBC  --  9.6   HGB  --  12.6   HCT  --  39.4    260     Recent Labs   Lab 09/26/24  0756      CO2 23   BUN 8.8   ALKPHOS 120   ALT 45   AST 50*       Imaging:  CT chest (September 2024):  - images directly reviewed, formal interpretation follows:  FINDINGS:   LUNGS AND PLEURA: Symmetric lung inflation. Minimal subpleural scarring in the bases in the posterior sulci and along the right lateral costal pleura towards the lateral sulcus. There is a solid peribronchial vascular nodule in the right lower lobe   superior segment measuring 4-5 mm (series 4, image 100). Smaller, 3 mm nodule with internal calcification along the medial right major fissure (series 4, image 114) is also stable. There is a ovoid calcification along the posterolateral costal pleura of   the right lower lobe, definitively benign. Central airways are patent. There are are visible secretions within multiple branch subsegmental airways in the basilar segments of both lower lobes. No postobstructive phenomenon. No pleural space abnormality.     MEDIASTINUM: Cardiac chambers are normal in size. No pericardial effusion. Nonaneurysmal thoracic aorta. 2 vessel arch anatomy. Mild to moderate calcified plaque is present in the proximal great vessels, aortic arch and descending thoracic aorta.   Esophagus is decompressed. No lymphadenopathy.     CORONARY ARTERY CALCIFICATION: Previous intervention (stents or CABG).     UPPER ABDOMEN: No actionable findings in the imaged upper abdomen.     MUSCULOSKELETAL: Thoracic vertebra are maintained in height and shape. No fractures or bone lesions.                                                                      IMPRESSION:      1.  No acute lung inflammatory process.  2.  Retained mucus within multiple subsegmental branch airways in the lower lobes but no associated postobstructive phenomenon.  3.  A few small pulmonary nodules are stable  largest of which in the right lower lobe superior segment measures 4-5 mm. No specific additional imaging workup or follow-up is necessary per 2017 Fleischner Society guidelines.     Pulmonary Function Testing:  PFT in 2017 with invalid measurements

## 2024-09-30 NOTE — PLAN OF CARE
Problem: Adult Inpatient Plan of Care  Goal: Absence of Hospital-Acquired Illness or Injury  Intervention: Identify and Manage Fall Risk  Recent Flowsheet Documentation  Taken 9/30/2024 0009 by Grace Herrera, RN  Safety Promotion/Fall Prevention:   activity supervised   clutter free environment maintained   lighting adjusted     Problem: COPD (Chronic Obstructive Pulmonary Disease)  Goal: Effective Oxygenation and Ventilation  Intervention: Promote Airway Secretion Clearance  Recent Flowsheet Documentation  Taken 9/29/2024 2345 by Grace Herrera, RN  Activity Management:   ambulated to bathroom   back to bed   Goal Outcome Evaluation:    Pt is Kyrgyz speaking, interpretor utilized  A/O, VSS on RA, pt complained of back and bilateral leg pain, Tylenol given and helpful  Pt assist 1 with gait belt, gait unsteady, SOB with walk to the bathroom and back  PRN Robitussin given, helpful  2am BG was 379, spot check at 4am , physician sticky note posted by writer  R AC IV was leaky and pulled, new IV anterior wrist

## 2024-10-01 VITALS
TEMPERATURE: 98.1 F | OXYGEN SATURATION: 96 % | HEART RATE: 99 BPM | DIASTOLIC BLOOD PRESSURE: 80 MMHG | WEIGHT: 127 LBS | SYSTOLIC BLOOD PRESSURE: 145 MMHG | BODY MASS INDEX: 24.94 KG/M2 | HEIGHT: 60 IN | RESPIRATION RATE: 16 BRPM

## 2024-10-01 LAB
GLUCOSE BLDC GLUCOMTR-MCNC: 326 MG/DL (ref 70–99)
GLUCOSE BLDC GLUCOMTR-MCNC: 349 MG/DL (ref 70–99)

## 2024-10-01 PROCEDURE — 250N000013 HC RX MED GY IP 250 OP 250 PS 637: Performed by: INTERNAL MEDICINE

## 2024-10-01 PROCEDURE — 99239 HOSP IP/OBS DSCHRG MGMT >30: CPT | Performed by: INTERNAL MEDICINE

## 2024-10-01 PROCEDURE — 94640 AIRWAY INHALATION TREATMENT: CPT

## 2024-10-01 PROCEDURE — 250N000011 HC RX IP 250 OP 636: Performed by: INTERNAL MEDICINE

## 2024-10-01 PROCEDURE — 250N000009 HC RX 250: Performed by: INTERNAL MEDICINE

## 2024-10-01 RX ORDER — DOXYCYCLINE 100 MG/1
100 CAPSULE ORAL 2 TIMES DAILY
Qty: 4 CAPSULE | Refills: 0 | Status: SHIPPED | OUTPATIENT
Start: 2024-10-01 | End: 2024-10-03

## 2024-10-01 RX ORDER — PREDNISONE 10 MG/1
TABLET ORAL
Qty: 40 TABLET | Refills: 0 | Status: SHIPPED | OUTPATIENT
Start: 2024-10-01 | End: 2024-10-17

## 2024-10-01 RX ORDER — CODEINE PHOSPHATE AND GUAIFENESIN 10; 100 MG/5ML; MG/5ML
5 SOLUTION ORAL EVERY 4 HOURS PRN
Qty: 118 ML | Refills: 0 | Status: SHIPPED | OUTPATIENT
Start: 2024-10-01

## 2024-10-01 RX ADMIN — SUCRALFATE 1 G: 1 SUSPENSION ORAL at 09:17

## 2024-10-01 RX ADMIN — IPRATROPIUM BROMIDE AND ALBUTEROL SULFATE 3 ML: .5; 3 SOLUTION RESPIRATORY (INHALATION) at 07:15

## 2024-10-01 RX ADMIN — POLYETHYLENE GLYCOL 3350 17 G: 17 POWDER, FOR SOLUTION ORAL at 09:14

## 2024-10-01 RX ADMIN — GUAIFENESIN 1200 MG: 600 TABLET ORAL at 09:18

## 2024-10-01 RX ADMIN — CHOLECALCIFEROL (VITAMIN D3) 10 MCG (400 UNIT) TABLET 400 UNITS: at 09:15

## 2024-10-01 RX ADMIN — METOPROLOL TARTRATE 25 MG: 25 TABLET, FILM COATED ORAL at 09:18

## 2024-10-01 RX ADMIN — DOXYCYCLINE HYCLATE 100 MG: 100 CAPSULE ORAL at 09:15

## 2024-10-01 RX ADMIN — ATORVASTATIN CALCIUM 80 MG: 40 TABLET, FILM COATED ORAL at 09:15

## 2024-10-01 RX ADMIN — ONDANSETRON 4 MG: 4 TABLET, ORALLY DISINTEGRATING ORAL at 08:09

## 2024-10-01 RX ADMIN — INSULIN GLARGINE 35 UNITS: 100 INJECTION, SOLUTION SUBCUTANEOUS at 07:58

## 2024-10-01 RX ADMIN — ASPIRIN 81 MG: 81 TABLET, COATED ORAL at 09:18

## 2024-10-01 RX ADMIN — PANTOPRAZOLE SODIUM 40 MG: 40 TABLET, DELAYED RELEASE ORAL at 06:27

## 2024-10-01 RX ADMIN — METHYLPREDNISOLONE SODIUM SUCCINATE 40 MG: 40 INJECTION, POWDER, FOR SOLUTION INTRAMUSCULAR; INTRAVENOUS at 09:17

## 2024-10-01 RX ADMIN — Medication 325 MG: at 09:27

## 2024-10-01 RX ADMIN — HYDROCHLOROTHIAZIDE 12.5 MG: 12.5 CAPSULE ORAL at 09:16

## 2024-10-01 RX ADMIN — DICLOFENAC SODIUM 4 G: 10 GEL TOPICAL at 09:18

## 2024-10-01 RX ADMIN — PANTOPRAZOLE SODIUM 40 MG: 40 TABLET, DELAYED RELEASE ORAL at 08:09

## 2024-10-01 RX ADMIN — COLCHICINE 0.6 MG: 0.6 TABLET, FILM COATED ORAL at 09:15

## 2024-10-01 RX ADMIN — GABAPENTIN 600 MG: 300 CAPSULE ORAL at 09:18

## 2024-10-01 RX ADMIN — METHYLPREDNISOLONE SODIUM SUCCINATE 40 MG: 40 INJECTION, POWDER, FOR SOLUTION INTRAMUSCULAR; INTRAVENOUS at 01:00

## 2024-10-01 ASSESSMENT — ACTIVITIES OF DAILY LIVING (ADL)
ADLS_ACUITY_SCORE: 24

## 2024-10-01 NOTE — PLAN OF CARE
Problem: COPD (Chronic Obstructive Pulmonary Disease)  Goal: Optimal Chronic Illness Coping  Outcome: Progressing  Goal: Optimal Level of Functional [place]  Outcome: Progressing  Intervention: Optimize Functional Ability  Recent Flowsheet Documentation  Taken 10/1/2024 0100 by Kitty Bennett RN  Activity Management: activity adjusted per tolerance  Goal: Absence of Infection Signs and Symptoms  Outcome: Progressing  Goal: Improved Oral Intake  Outcome: Progressing  Goal: Effective Oxygenation and Ventilation  Outcome: Progressing  Intervention: Promote Airway Secretion Clearance  Recent Flowsheet Documentation  Taken 10/1/2024 0100 by Kitty Bennett RN  Cough And Deep Breathing: done independently per patient  Activity Management: activity adjusted per tolerance  Intervention: Optimize Oxygenation and Ventilation  Recent Flowsheet Documentation  Taken 10/1/2024 0100 by Kitty Bennett RN  Head of Bed (HOB) Positioning: HOB at 20-30 degrees     Problem: Comorbidity Management  Goal: Maintenance of Asthma Control  Outcome: Progressing  Intervention: Maintain Asthma Symptom Control  Recent Flowsheet Documentation  Taken 10/1/2024 0100 by Kitty Bennett RN  Medication Review/Management: medications reviewed  Goal: Blood Glucose Levels Within Targeted Range  Outcome: Progressing  Intervention: Monitor and Manage Glycemia  Recent Flowsheet Documentation  Taken 10/1/2024 0100 by Kitty Bennett RN  Medication Review/Management: medications reviewed  Goal: Maintenance of Behavioral Health Symptom Control  Outcome: Progressing  Intervention: Maintain Behavioral Health Symptom Control  Recent Flowsheet Documentation  Taken 10/1/2024 0100 by Kitty Bennett RN  Medication Review/Management: medications reviewed  Goal: Blood Pressure in Desired Range  Outcome: Progressing  Intervention: Maintain Blood Pressure Management  Recent Flowsheet Documentation  Taken 10/1/2024 0100 by Kitty Bennett, RN  Medication  Review/Management: medications reviewed   Goal Outcome Evaluation:     Patient is alert and oriented and able to make needs known.  [facility]  utilized during cares.  Patient slept most of the night.  States pain and SOB are improving.      /64 (BP Location: Left arm)   Pulse 79   Temp 98  F (36.7  C) (Oral)   Resp 16   Ht 1.524 m (5')   Wt 57.6 kg (127 lb)   SpO2 95%   BMI 24.80 kg/m      April MIA Bennett RN

## 2024-10-01 NOTE — DISCHARGE SUMMARY
[facility]    Discharge Summary  Hospitalist    Date of Admission:  9/26/2024  Date of Discharge:  10/1/2024  Discharging Provider: Trinh Gutierrez MD  Date of Service (when I saw the patient): 10/01/24    Discharge Diagnoses   Acute on chronic respiratory distress  Acute severe persistent asthma  IDDM    History of Present Illness   Kulwant Amin is an 54 year old female who presented with sob, cough    Hospital Course   Kulwant Amin is a 54 year old female admitted on 9/26/2024. She has COPD, asthma, recent COVID infection, latent TB. Recent Prednisone and Paxlovid for COVID-19 infection. Came in for worse cough and sob.     Acute on chronic resp distress  Acute severe persistent asthma  -Dyspnea and cough   -wheezing on exam  -chest ct: no acute inflammatory changes;+mucus and nodules  -start on Solumedrol, nebs and Doxycycline  - Consulted pulm: iv solumedrol., scheduled nebs, continue montelukast. Changed to tapering prednisone at discharge after symptoms improved.  - patient has previously declined to travel for second opinion at an asthma center  - with failure of outpatient triple-therapy, LTRA and biologic, should consider bronchial thermoplasty     DM 2 uncontrolled  -hgb A1c 7.3  -PTA: Lantus 40 unit(s) QAM, Novolog 16 units BID  -lantus 20 units BID and sliding scale insulin; premeal 1:10 Novolog. Due to uncontrolled DM, increasing Lantus to 25 units bid and premeal Novolog 1:6, sliding scale insulin to high dose.   -Increase insulin as needed  -hold Metformin.   -Resume PTA regimen at discharge.      Recent COVID  -diagnosed 2 weeks ago  -treated with Paxlovid  -off isolation     H/o latent TB, not active now  Anxiety  Depression  CAD, s/p stents, no cp  HTN  GERD  Anemia, microcytic   Gout  --resume PTA meds after pharmacy review       Trinh Gutierrez MD    Significant Results and Procedures   See below    Pending Results     Unresulted Labs Ordered in the Past 30 Days of  this Admission       No orders found from 8/27/2024 to 9/27/2024.            Code Status   Full Code       Primary Care Physician   Adrien Cardoza    General.  Awake alert oriented in mild resp distress.  HEENT.  Pupils equal round react to light, anicteric, EOM intact.  Neck supple no JVD.  CVS regular rhythm no murmur gallops.  Lungs.  Mild wheezing to auscultation bilateral   Abdomen.  Soft nontender bowel sounds present.  Extremities.  No edema no calf tenderness.  Neurological.  Awake and alert. No focal deficit.  Skin no rash. No pallor.  Psych. Normal mood.      Discharge Disposition   Discharged to home  Condition at discharge: Fair    Consultations This Hospital Stay   PULMONARY IP CONSULT    Time Spent on this Encounter   I, Trinh Gutierrez MD, personally saw the patient today and spent greater than 30 minutes discharging this patient.    Discharge Orders   No discharge procedures on file.  Discharge Medications   Current Discharge Medication List        CONTINUE these medications which have NOT CHANGED    Details   acetaminophen (TYLENOL) 500 MG tablet TAKE 1 PILL BY MOUTH EVERY 6 HOURS AS NEEDED FOR PAIN  Qty: 100 tablet, Refills: 10    Associated Diagnoses: Dental infection      acetaminophen-codeine (TYLENOL #3) 300-30 MG per tablet Take 1 tablet by mouth every 6 hours as needed for severe pain  Qty: 30 tablet, Refills: 0    Comments: Chronic pain  Associated Diagnoses: Chronic low back pain without sciatica, unspecified back pain laterality      albuterol (PROAIR HFA/PROVENTIL HFA/VENTOLIN HFA) 108 (90 Base) MCG/ACT inhaler Inhale 2 puffs into the lungs every 4 hours as needed for shortness of breath  Qty: 18 g, Refills: 11    Comments: Pharmacy may dispense brand covered by insurance (Proair, or proventil or ventolin or generic albuterol inhaler)  Associated Diagnoses: Eosinophilic asthma; Severe persistent asthma, unspecified whether complicated (H)      albuterol (PROVENTIL) (2.5 MG/3ML) 0.083% neb  solution Take 1 vial (2.5 mg) by nebulization every 6 hours as needed for shortness of breath, wheezing or cough  Qty: 360 mL, Refills: 11    Associated Diagnoses: Severe persistent asthma without complication (H)      amitriptyline (ELAVIL) 50 MG tablet Take 1 tablet (50 mg) by mouth at bedtime  Qty: 90 tablet, Refills: 1    Associated Diagnoses: Intractable chronic migraine without aura and without status migrainosus; Medication overuse headache      aspirin 81 MG EC tablet Take 81 mg by mouth daily.      atorvastatin (LIPITOR) 80 MG tablet Take 1 tablet (80 mg) by mouth daily  Qty: 90 tablet, Refills: 3    Associated Diagnoses: Encounter for medication refill      calcium carbonate (TUMS) 500 MG chewable tablet Take 1 chew tab by mouth 2 times daily as needed for heartburn.      colchicine (COLCRYS) 0.6 MG tablet Take 1 tablet (0.6 mg) by mouth daily  Qty: 90 tablet, Refills: 3    Associated Diagnoses: Chest pain, unspecified type      Continuous Blood Gluc Sensor (FREESTYLE MONICA 2 SENSOR) MISC 1 EACH EVERY 14 DAYS USE 1 SENSOR EVERY 14 DAYS. USE TO READ BLOOD SUGARS PER 'S INSTRUCTIONS.  Qty: 2 each, Refills: 11    Associated Diagnoses: Type 2 diabetes mellitus treated with insulin (H)      diclofenac (VOLTAREN) 1 % topical gel Apply 4 g topically 4 times daily  Qty: 350 g, Refills: 3    Associated Diagnoses: Pain in both knees, unspecified chronicity      FEROSUL 325 (65 Fe) MG tablet TAKE 1 TABLET (325 MG) BY MOUTH DAILY (WITH BREAKFAST)  Qty: 30 tablet, Refills: 0    Associated Diagnoses: Leg cramps; Low ferritin      fluticasone-vilanterol (BREO ELLIPTA) 200-25 MCG/ACT inhaler Inhale 1 puff into the lungs daily  Qty: 60 each, Refills: 11    Associated Diagnoses: Severe persistent asthma without complication (H)      gabapentin (NEURONTIN) 600 MG tablet TAKE 1 PILL (600 MG) BY MOUTH 3 TIMES DAILY  Qty: 90 tablet, Refills: 3    Associated Diagnoses: Chronic bilateral low back pain with bilateral  sciatica; Encounter for medication refill      hydrochlorothiazide (MICROZIDE) 12.5 MG capsule Take 1 capsule (12.5 mg) by mouth every morning  Qty: 90 capsule, Refills: 3    Associated Diagnoses: Essential hypertension      ibuprofen (ADVIL/MOTRIN) 200 MG tablet Take 2 tablets (400 mg) by mouth every 8 hours as needed for pain.  Qty: 60 tablet, Refills: 0      insulin aspart (NOVOLOG PEN) 100 UNIT/ML pen Inject 16 Units subcutaneously 2 times daily (before meals).  Qty: 45 mL, Refills: 3    Associated Diagnoses: Type 2 diabetes mellitus treated with insulin (H)      insulin glargine (LANTUS PEN) 100 UNIT/ML pen Inject 40 Units subcutaneously every morning.  Qty: 45 mL, Refills: 3    Comments: If Lantus is not covered by insurance, may substitute Basaglar or Semglee or other insulin glargine product per insurance preference at same dose and frequency.    Associated Diagnoses: Type 2 diabetes mellitus treated with insulin (H)      ipratropium - albuterol 0.5 mg/2.5 mg/3 mL (DUONEB) 0.5-2.5 (3) MG/3ML neb solution Take 1 vial (3 mLs) by nebulization every 6 hours as needed for shortness of breath, wheezing or cough.  Qty: 180 mL, Refills: 3    Associated Diagnoses: Severe persistent asthma with exacerbation (H)      Lidocaine (LIDOCARE) 4 % Patch Place 1 patch onto the skin every 24 hours To prevent lidocaine toxicity, patient should be patch free for 12 hrs daily.  Qty: 30 patch, Refills: 1    Associated Diagnoses: Chronic bilateral low back pain with bilateral sciatica      metFORMIN (GLUCOPHAGE XR) 500 MG 24 hr tablet Take 2 tablets (1,000 mg) by mouth 2 times daily (with meals)  Qty: 360 tablet, Refills: 3    Associated Diagnoses: Type 2 diabetes mellitus treated with insulin (H)      metoprolol tartrate (LOPRESSOR) 25 MG tablet TAKE 1 TABLET (25 MG TOTAL) BY MOUTH 2 (TWO) TIMES A DAY FOR BLOOD PRESSURE  Qty: 180 tablet, Refills: 3    Associated Diagnoses: Coronary artery disease involving native coronary artery  of native heart without angina pectoris      montelukast (SINGULAIR) 10 MG tablet Take 1 tablet (10 mg) by mouth at bedtime  Qty: 30 tablet, Refills: 11    Associated Diagnoses: Environmental allergies      NEXIUM 40 MG DR capsule Take 40 mg by mouth every morning (before breakfast).      polyethylene glycol (MIRALAX) 17 GM/Dose powder Take 17 g (1 Capful) by mouth daily  Qty: 238 g, Refills: 1    Associated Diagnoses: Constipation, unspecified constipation type      predniSONE (DELTASONE) 10 MG tablet Take 4 tablets (40 mg) by mouth daily for 4 days, THEN 3 tablets (30 mg) daily for 4 days, THEN 2 tablets (20 mg) daily for 4 days, THEN 1 tablet (10 mg) daily for 4 days.  Qty: 40 tablet, Refills: 0    Associated Diagnoses: Severe persistent asthma with exacerbation (H)      sennosides (SENOKOT) 8.6 MG tablet Take 1 tablet by mouth daily as needed for constipation.  Qty: 30 tablet, Refills: 3    Associated Diagnoses: Constipation, unspecified constipation type      SPIRIVA RESPIMAT 2.5 MCG/ACT inhaler INHALE 2 PUFFS INTO THE LUNGS DAILY  Qty: 4 g, Refills: 0    Associated Diagnoses: Chronic obstructive pulmonary disease, unspecified COPD type (H)      sucralfate (CARAFATE) 1 GM/10ML suspension Take 10 mLs (1 g) by mouth 4 times daily.  Qty: 3600 mL, Refills: 1    Associated Diagnoses: Gastroesophageal reflux disease without esophagitis      vitamin D3 (CHOLECALCIFEROL) 10 MCG (400 UNIT) capsule Take 1 capsule (400 Units) by mouth daily.  Qty: 90 capsule, Refills: 3    Associated Diagnoses: Takes dietary supplements      insulin pen needle (32G X 4 MM) 32G X 4 MM miscellaneous Use 3 pen needles daily or as directed.  Qty: 300 each, Refills: 4    Associated Diagnoses: Type 2 diabetes mellitus treated with insulin (H)           Allergies   No Known Allergies  Data   Most Recent 3 CBC's:  Recent Labs   Lab Test 09/29/24  0516 09/26/24  0756 07/09/24  0724 06/15/24  2304   WBC  --  9.6 6.1 7.3   HGB  --  12.6 12.3 12.8    MCV  --  85 83 84    260 178 196      Most Recent 3 BMP's:  Recent Labs   Lab Test 10/01/24  0701 10/01/24  0157 09/30/24 2015 09/26/24  1702 09/26/24  0756 07/09/24  0724 06/15/24  2304   NA  --   --   --   --  135 140 139   POTASSIUM  --   --   --   --  4.3 3.8 3.9   CHLORIDE  --   --   --   --  99 103 104   CO2  --   --   --   --  23 24 24   BUN  --   --   --   --  8.8 13.6 8.7   CR  --   --   --   --  0.49* 0.50* 0.55   ANIONGAP  --   --   --   --  13 13 11   FREDY  --   --   --   --  9.0 9.2 9.4   * 326* 298*   < > 262* 123* 104*    < > = values in this interval not displayed.     Most Recent 2 LFT's:  Recent Labs   Lab Test 09/26/24 0756 07/09/24 0724   AST 50* 31   ALT 45 31   ALKPHOS 120 93   BILITOTAL 0.4 0.4     Most Recent INR's and Anticoagulation Dosing History:  Anticoagulation Dose History  More data exists         Latest Ref Rng & Units 5/22/2019 1/6/2020 2/11/2021 5/27/2021 7/2/2022 9/6/2022 3/1/2024   Recent Dosing and Labs   INR 0.85 - 1.15 1.01  0.96  0.99  0.98  1.03  1.02  0.97       Details                 Most Recent 3 Troponin's:No lab results found.  Most Recent Cholesterol Panel:  Recent Labs   Lab Test 05/23/24  1054   CHOL 128   LDL 38   HDL 63   TRIG 137     Most Recent 6 Bacteria Isolates From Any Culture (See EPIC Reports for Culture Details):No lab results found.  Most Recent TSH, T4 and A1c Labs:  Recent Labs   Lab Test 09/26/24  0756 01/22/24  1022 11/07/23  1046 07/14/22  1138 05/06/22  0426   TSH  --   --  0.35   < > 0.18*   T4  --   --   --   --  1.02   A1C 7.3*   < > 11.1*   < >  --     < > = values in this interval not displayed.     Results for orders placed or performed during the hospital encounter of 09/26/24   Chest XR,  PA & LAT    Narrative    EXAM: XR CHEST 2 VIEWS  LOCATION: [facility]  DATE: 9/26/2024    INDICATION: wheeze, cough, covid history  COMPARISON: 9/13/2024      Impression    IMPRESSION: Mild bibasilar  atelectasis/scar. Small right pleural effusion. Stable cardiac silhouette at the upper limits of normal for size. Calcified granuloma. Normal pulmonary vascularity.   Chest CT w/o contrast    Narrative    EXAM: CT CHEST W/O CONTRAST  LOCATION: [facility]  DATE: 9/26/2024    INDICATION: shortness of breath, wheezing  COMPARISON: CT pulmonary angiogram 6/15/2024, 7/24/2023  TECHNIQUE: CT chest without IV contrast. Multiplanar reformats were obtained. Dose reduction techniques were used.  CONTRAST: None.    FINDINGS:   LUNGS AND PLEURA: Symmetric lung inflation. Minimal subpleural scarring in the bases in the posterior sulci and along the right lateral costal pleura towards the lateral sulcus. There is a solid peribronchial vascular nodule in the right lower lobe   superior segment measuring 4-5 mm (series 4, image 100). Smaller, 3 mm nodule with internal calcification along the medial right major fissure (series 4, image 114) is also stable. There is a ovoid calcification along the posterolateral costal pleura of   the right lower lobe, definitively benign. Central airways are patent. There are are visible secretions within multiple branch subsegmental airways in the basilar segments of both lower lobes. No postobstructive phenomenon. No pleural space abnormality.    MEDIASTINUM: Cardiac chambers are normal in size. No pericardial effusion. Nonaneurysmal thoracic aorta. 2 vessel arch anatomy. Mild to moderate calcified plaque is present in the proximal great vessels, aortic arch and descending thoracic aorta.   Esophagus is decompressed. No lymphadenopathy.    CORONARY ARTERY CALCIFICATION: Previous intervention (stents or CABG).    UPPER ABDOMEN: No actionable findings in the imaged upper abdomen.    MUSCULOSKELETAL: Thoracic vertebra are maintained in height and shape. No fractures or bone lesions.      Impression    IMPRESSION:     1.  No acute lung inflammatory process.  2.  Retained mucus  within multiple subsegmental branch airways in the lower lobes but no associated postobstructive phenomenon.  3.  A few small pulmonary nodules are stable largest of which in the right lower lobe superior segment measures 4-5 mm. No specific additional imaging workup or follow-up is necessary per 2017 Fleischner Society guidelines.       *Note: Due to a large number of results and/or encounters for the requested time period, some results have not been displayed. A complete set of results can be found in Results Review.

## 2024-10-01 NOTE — PROGRESS NOTES
Pt discharged home at 11:20 with her daughter-in-law as . She said that she has all of her supplies. She has her discharge paperwork.

## 2024-10-01 NOTE — PLAN OF CARE
Problem: Adult Inpatient Plan of Care  Goal: Plan of Care Review  Outcome: Progressing  Flowsheets (Taken 9/30/2024 2321)  Plan of Care Reviewed With: patient     Problem: Adult Inpatient Plan of Care  Goal: Patient-Specific Goal (Individualized)  Outcome: Progressing     Problem: Adult Inpatient Plan of Care  Goal: Absence of Hospital-Acquired Illness or Injury  Outcome: Progressing  Intervention: Identify and Manage Fall Risk  Recent Flowsheet Documentation  Taken 9/30/2024 1700 by Adegun, Oluwadamilola, RN  Safety Promotion/Fall Prevention:   activity supervised   nonskid shoes/slippers when out of bed  Intervention: Prevent Skin Injury  Recent Flowsheet Documentation  Taken 9/30/2024 1700 by Adegun, Oluwadamilola, RN  Body Position: position changed independently  Intervention: Prevent Infection  Recent Flowsheet Documentation  Taken 9/30/2024 1700 by Adegun, Oluwadamilola, RN  Infection Prevention: hand hygiene promoted     Problem: Adult Inpatient Plan of Care  Goal: Optimal Comfort and Wellbeing  Outcome: Progressing     Problem: Comorbidity Management  Goal: Blood Glucose Levels Within Targeted Range  Outcome: Progressing  Intervention: Monitor and Manage Glycemia  Recent Flowsheet Documentation  Taken 9/30/2024 1700 by Adegun, Oluwadamilola, RN  Medication Review/Management: medications reviewed     Problem: Comorbidity Management  Goal: Blood Pressure in Desired Range  Outcome: Progressing  Intervention: Maintain Blood Pressure Management  Recent Flowsheet Documentation  Taken 9/30/2024 1700 by Adegun, Oluwadamilola, RN  Medication Review/Management: medications reviewed     Problem: COPD (Chronic Obstructive Pulmonary Disease)  Goal: Effective Oxygenation and Ventilation  Outcome: Progressing  Intervention: Promote Airway Secretion Clearance  Recent Flowsheet Documentation  Taken 9/30/2024 1700 by Adegun, Oluwadamilola, RN  Cough And Deep Breathing: done independently per patient  Activity Management:  activity adjusted per tolerance  Intervention: Optimize Oxygenation and Ventilation  Recent Flowsheet Documentation  Taken 9/30/2024 1700 by Adegun, Oluwadamilola, RN  Head of Bed (HOB) Positioning: HOB at 20-30 degrees     Problem: Pain Acute  Goal: Optimal Pain Control and Function  Outcome: Progressing  Intervention: Prevent or Manage Pain  Recent Flowsheet Documentation  Taken 9/30/2024 1700 by Adegun, Oluwadamilola, RN  Medication Review/Management: medications reviewed     Problem: Fall Injury Risk  Goal: Absence of Fall and Fall-Related Injury  Outcome: Progressing  Intervention: Identify and Manage Contributors  Recent Flowsheet Documentation  Taken 9/30/2024 1700 by Adegun, Oluwadamilola, RN  Medication Review/Management: medications reviewed  Intervention: Promote Injury-Free Environment  Recent Flowsheet Documentation  Taken 9/30/2024 1700 by Adegun, Oluwadamilola, RN  Safety Promotion/Fall Prevention:   activity supervised   nonskid shoes/slippers when out of bed   Goal Outcome Evaluation: Pt alert and oriented, Yi speaking,  services utilized. Pain managed with PRN tylenol. LS wheezy. Bedtime , insulin given.       Plan of Care Reviewed With: patient

## 2024-10-03 ENCOUNTER — PATIENT OUTREACH (OUTPATIENT)
Dept: CARE COORDINATION | Facility: CLINIC | Age: 54
End: 2024-10-03
Payer: COMMERCIAL

## 2024-10-03 NOTE — PROGRESS NOTES
[facility]: Transitions of Care Outreach  Chief Complaint   Patient presents with    Clinic Care Coordination - Post Hospital       Most Recent Admission Date: 9/26/2024   Most Recent Admission Diagnosis: Shortness of breath - R06.02     Most Recent Discharge Date: 10/1/2024   Most Recent Discharge Diagnosis: Shortness of breath - R06.02  Severe persistent asthma with exacerbation (H) - J45.51  Chronic obstructive pulmonary disease with acute exacerbation (H) - J44.1     Transitions of Care Assessment    Discharge Assessment  How are you doing now that you are home?: She is doing good.  How are your symptoms? (Red Flag symptoms escalate to triage hotline per guidelines): Improved  Do you know how to contact your clinic care team if you have future questions or changes to your health status? : Yes  Does the patient have their discharge instructions? : Yes  Does the patient have questions regarding their discharge instructions? : No  Were you started on any new medications or were there changes to any of your previous medications? : Yes  Does the patient have all of their medications?: Yes  Do you have questions regarding any of your medications? : No              [facility] Explained and offered Care Coordination support to eligible patients: Yes    Patient accepted? No    Follow up Plan     Discharge Follow-Up  Discharge follow up appointment scheduled in alignment with recommended follow up timeframe or Transitions of Risk Category? (Low = within 30 days; Moderate= within 14 days; High= within 7 days): Yes  Discharge Follow Up Appointment Date: 10/14/24  Discharge Follow Up Appointment Scheduled with?: Primary Care Provider    Future Appointments   Date Time Provider Department Center   10/14/2024 10:30 AM Adrien Cardoza MD [facility]   11/5/2024  1:15 PM Ellen Harris NP [facility]   11/12/2024 11:00 AM Malu Gil, PharmD [facility]   12/13/2024  2:00 PM Tamra Duong MD  [facility]   2/6/2025  9:30 AM Guerrero Prescott MD [facility]       Outpatient Plan as outlined on AVS reviewed with patient.    For any urgent concerns, please contact our 24 hour nurse triage line: [phone] ([phone])       KOLE Toribio  [phone]  [facility]

## 2024-10-07 ENCOUNTER — OFFICE VISIT (OUTPATIENT)
Dept: FAMILY MEDICINE | Facility: CLINIC | Age: 54
End: 2024-10-07
Payer: COMMERCIAL

## 2024-10-07 VITALS
SYSTOLIC BLOOD PRESSURE: 111 MMHG | DIASTOLIC BLOOD PRESSURE: 75 MMHG | OXYGEN SATURATION: 97 % | TEMPERATURE: 98.1 F | HEART RATE: 94 BPM | RESPIRATION RATE: 20 BRPM

## 2024-10-07 DIAGNOSIS — B37.0 THRUSH: Primary | ICD-10-CM

## 2024-10-07 PROCEDURE — 99213 OFFICE O/P EST LOW 20 MIN: CPT | Performed by: FAMILY MEDICINE

## 2024-10-07 RX ORDER — NYSTATIN 100000 [USP'U]/ML
500000 SUSPENSION ORAL 4 TIMES DAILY
Qty: 200 ML | Refills: 0 | Status: SHIPPED | OUTPATIENT
Start: 2024-10-07 | End: 2024-10-17

## 2024-10-07 RX ORDER — NAPROXEN 500 MG/1
500 TABLET ORAL
COMMUNITY
End: 2024-11-12

## 2024-10-07 RX ORDER — OMEGA-3S/DHA/EPA/FISH OIL/D3 300MG-1000
CAPSULE ORAL DAILY
COMMUNITY
Start: 2024-10-05

## 2024-10-07 NOTE — PROGRESS NOTES
Kulwant was seen today for mouth/lip problem.    Diagnoses and all orders for this visit:    Thrush  -     nystatin (MYCOSTATIN) 457148 UNIT/ML suspension; Take 5 mLs (500,000 Units) by mouth 4 times daily for 10 days.      Call if symptoms are persistent.    Subjective   Kulwant Amin is a 54 year old is presenting for the following health issues:  bumps on tongue x 2 days. Pt daughter in law states pt c/o pain with eating. No fever. Tylenol last dose 12PM today      HPI : Kulwant Amin is here with daughter - in - law who is interpreting.  Started to have bumps in mouth 2 days ago that are painful and it is hard to eat.  Bumps are white. No sore throat. No fevers or chills.  No new myalgias.      No new toothpaste or herbal meds / supplements.  No recent travel.  No dental problems. She has been on a prednisone taper for her asthma.  Was hospitalized and discharged 9/26 -10/1/2024 and treated with solumedrol.      Review of Systems:     Patient Active Problem List   Diagnosis    Pulmonary nodule, right    Environmental allergies    TB lung, latent    COPD (chronic obstructive pulmonary disease)/Asthma     Essential hypertension    Cataracts, bilateral    Corneal opacity    Irregular astigmatism    Anxiety    Chronic nonintractable headache, unspecified headache type    Coronary artery disease due to lipid rich plaque    Dizziness    Hyperlipidemia    Moderate persistent asthma    Unspecified visual loss    GERD (gastroesophageal reflux disease)    Dental caries    H/O arterial ischemic stroke    Constipation    Dysphagia    Chronic abdominal pain    Insomnia    FLACO (obstructive sleep apnea)- mild (AHI 14)    Type 2 diabetes mellitus treated with insulin (H)    Major depression, recurrent (H)    Chronic low back pain without sciatica, unspecified back pain laterality    Shortness of breath              Objective:  /75   Pulse 94   Temp 98.1  F (36.7  C) (Tympanic)   Resp 20   SpO2 97%  No acute  distress.    HEENT: Head is atraumatic and/normocephalic.  PERRL.  Conjunctiva clear.  Tympanic membranes grey with normal landmarks and normal light reflexes.  No nasal discharge.  Oropharynx is pink and moist.  There are 2 mm white patches on top and sides of tongue which scrape off and tongue is painful.                Nel Brady MD

## 2024-10-10 DIAGNOSIS — Z79.4 TYPE 2 DIABETES MELLITUS TREATED WITH INSULIN (H): ICD-10-CM

## 2024-10-10 DIAGNOSIS — R25.2 LEG CRAMPS: ICD-10-CM

## 2024-10-10 DIAGNOSIS — E11.9 TYPE 2 DIABETES MELLITUS TREATED WITH INSULIN (H): ICD-10-CM

## 2024-10-10 DIAGNOSIS — R79.0 LOW FERRITIN: ICD-10-CM

## 2024-10-10 NOTE — TELEPHONE ENCOUNTER
Refill request for: FEROSUL 325 (65 Fe) MG tablet    Directions: TAKE 1 TABLET (325 MG) BY MOUTH DAILY (WITH BREAKFAST)     LOV: 03/18/24  NOV: 02/06/25    Pt of Dr. Prescott. He is out of the office until 10/14/24. Can you refill in his absence?    Last ferritin level on 11/7/23, level was 26.    30 day supply with 4 refills Medication T'd for review and signature    Urvashi Saha LPN on 10/10/2024 at 4:08 PM

## 2024-10-11 RX ORDER — FERROUS SULFATE 325(65) MG
325 TABLET ORAL
Qty: 30 TABLET | Refills: 4 | Status: SHIPPED | OUTPATIENT
Start: 2024-10-11 | End: 2024-10-21

## 2024-10-14 ENCOUNTER — OFFICE VISIT (OUTPATIENT)
Dept: FAMILY MEDICINE | Facility: CLINIC | Age: 54
End: 2024-10-14
Payer: COMMERCIAL

## 2024-10-14 VITALS
TEMPERATURE: 98.3 F | BODY MASS INDEX: 24.44 KG/M2 | RESPIRATION RATE: 12 BRPM | HEART RATE: 98 BPM | DIASTOLIC BLOOD PRESSURE: 84 MMHG | OXYGEN SATURATION: 100 % | WEIGHT: 124.5 LBS | HEIGHT: 60 IN | SYSTOLIC BLOOD PRESSURE: 136 MMHG

## 2024-10-14 DIAGNOSIS — R10.2 PELVIC PAIN: ICD-10-CM

## 2024-10-14 DIAGNOSIS — E11.9 TYPE 2 DIABETES MELLITUS TREATED WITH INSULIN (H): ICD-10-CM

## 2024-10-14 DIAGNOSIS — J44.1 COPD EXACERBATION (H): ICD-10-CM

## 2024-10-14 DIAGNOSIS — I25.83 CORONARY ARTERY DISEASE DUE TO LIPID RICH PLAQUE: Chronic | ICD-10-CM

## 2024-10-14 DIAGNOSIS — F33.9 RECURRENT MAJOR DEPRESSIVE DISORDER, REMISSION STATUS UNSPECIFIED (H): ICD-10-CM

## 2024-10-14 DIAGNOSIS — Z09 HOSPITAL DISCHARGE FOLLOW-UP: Primary | ICD-10-CM

## 2024-10-14 DIAGNOSIS — Z79.4 TYPE 2 DIABETES MELLITUS TREATED WITH INSULIN (H): ICD-10-CM

## 2024-10-14 DIAGNOSIS — I25.10 CORONARY ARTERY DISEASE DUE TO LIPID RICH PLAQUE: Chronic | ICD-10-CM

## 2024-10-14 LAB
ALBUMIN SERPL BCG-MCNC: 4.1 G/DL (ref 3.5–5.2)
ALBUMIN UR-MCNC: NEGATIVE MG/DL
ALP SERPL-CCNC: 116 U/L (ref 40–150)
ALT SERPL W P-5'-P-CCNC: 56 U/L (ref 0–50)
ANION GAP SERPL CALCULATED.3IONS-SCNC: 13 MMOL/L (ref 7–15)
APPEARANCE UR: CLEAR
AST SERPL W P-5'-P-CCNC: 70 U/L (ref 0–45)
BILIRUB SERPL-MCNC: 0.3 MG/DL
BILIRUB UR QL STRIP: NEGATIVE
BUN SERPL-MCNC: 8.4 MG/DL (ref 6–20)
CALCIUM SERPL-MCNC: 9.4 MG/DL (ref 8.8–10.4)
CHLORIDE SERPL-SCNC: 103 MMOL/L (ref 98–107)
COLOR UR AUTO: YELLOW
CREAT SERPL-MCNC: 0.51 MG/DL (ref 0.51–0.95)
CREAT UR-MCNC: 89.7 MG/DL
EGFRCR SERPLBLD CKD-EPI 2021: >90 ML/MIN/1.73M2
ERYTHROCYTE [DISTWIDTH] IN BLOOD BY AUTOMATED COUNT: 14.2 % (ref 10–15)
GLUCOSE SERPL-MCNC: 164 MG/DL (ref 70–99)
GLUCOSE UR STRIP-MCNC: NEGATIVE MG/DL
HCO3 SERPL-SCNC: 22 MMOL/L (ref 22–29)
HCT VFR BLD AUTO: 39.3 % (ref 35–47)
HGB BLD-MCNC: 12.6 G/DL (ref 11.7–15.7)
HGB UR QL STRIP: NEGATIVE
KETONES UR STRIP-MCNC: NEGATIVE MG/DL
LEUKOCYTE ESTERASE UR QL STRIP: NEGATIVE
MCH RBC QN AUTO: 27.6 PG (ref 26.5–33)
MCHC RBC AUTO-ENTMCNC: 32.1 G/DL (ref 31.5–36.5)
MCV RBC AUTO: 86 FL (ref 78–100)
MICROALBUMIN UR-MCNC: <12 MG/L
MICROALBUMIN/CREAT UR: NORMAL MG/G{CREAT}
NITRATE UR QL: NEGATIVE
PH UR STRIP: 6 [PH] (ref 5–7)
PLATELET # BLD AUTO: 129 10E3/UL (ref 150–450)
POTASSIUM SERPL-SCNC: 4.1 MMOL/L (ref 3.4–5.3)
PROT SERPL-MCNC: 6.9 G/DL (ref 6.4–8.3)
RBC # BLD AUTO: 4.57 10E6/UL (ref 3.8–5.2)
SODIUM SERPL-SCNC: 138 MMOL/L (ref 135–145)
SP GR UR STRIP: 1.01 (ref 1–1.03)
TSH SERPL DL<=0.005 MIU/L-ACNC: 0.82 UIU/ML (ref 0.3–4.2)
UROBILINOGEN UR STRIP-ACNC: 0.2 E.U./DL
WBC # BLD AUTO: 5 10E3/UL (ref 4–11)

## 2024-10-14 PROCEDURE — 99495 TRANSJ CARE MGMT MOD F2F 14D: CPT | Performed by: FAMILY MEDICINE

## 2024-10-14 PROCEDURE — 85027 COMPLETE CBC AUTOMATED: CPT | Performed by: FAMILY MEDICINE

## 2024-10-14 PROCEDURE — 82570 ASSAY OF URINE CREATININE: CPT | Performed by: FAMILY MEDICINE

## 2024-10-14 PROCEDURE — 84443 ASSAY THYROID STIM HORMONE: CPT | Performed by: FAMILY MEDICINE

## 2024-10-14 PROCEDURE — 82043 UR ALBUMIN QUANTITATIVE: CPT | Performed by: FAMILY MEDICINE

## 2024-10-14 PROCEDURE — 36415 COLL VENOUS BLD VENIPUNCTURE: CPT | Performed by: FAMILY MEDICINE

## 2024-10-14 PROCEDURE — 81003 URINALYSIS AUTO W/O SCOPE: CPT | Performed by: FAMILY MEDICINE

## 2024-10-14 PROCEDURE — 80053 COMPREHEN METABOLIC PANEL: CPT | Performed by: FAMILY MEDICINE

## 2024-10-14 ASSESSMENT — PATIENT HEALTH QUESTIONNAIRE - PHQ9
SUM OF ALL RESPONSES TO PHQ QUESTIONS 1-9: 9
10. IF YOU CHECKED OFF ANY PROBLEMS, HOW DIFFICULT HAVE THESE PROBLEMS MADE IT FOR YOU TO DO YOUR WORK, TAKE CARE OF THINGS AT HOME, OR GET ALONG WITH OTHER PEOPLE: SOMEWHAT DIFFICULT
SUM OF ALL RESPONSES TO PHQ QUESTIONS 1-9: 9

## 2024-10-14 NOTE — PROGRESS NOTES
{ROS Picklists (Optional):480358}      Objective    /84 (BP Location: Left arm, Patient Position: Sitting, Cuff Size: Adult Regular)   Pulse 98   Temp 98.3  F (36.8  C) (Oral)   Resp 12   Ht 1.524 m (5')   Wt 56.5 kg (124 lb 8 oz)   SpO2 100%   BMI 24.31 kg/m    Body mass index is 24.31 kg/m .  Physical Exam   {Exam List (Optional):719746}    {Diagnostic Test Results (Optional):410594}        Signed Electronically by: Adrien Cardoza MD  {Email feedback regarding this note to [email or web address]   :[id number]}

## 2024-10-14 NOTE — PROGRESS NOTES
Hospital discharge follow-up  Stable.    COPD exacerbation (H)  Back at baseline.  Follow-up with pulmonology as scheduled.    Type 2 diabetes mellitus treated with insulin (H)  - Albumin Random Urine Quantitative with Creat Ratio; Future  - Albumin Random Urine Quantitative with Creat Ratio  - CBC with platelets  - TSH with free T4 reflex  - Comprehensive metabolic panel (BMP + Alb, Alk Phos, ALT, AST, Total. Bili, TP)    Pelvic pain  Negative UA.  No abnormal vaginal bleeding.  Observation for now since patient is on multiple medications already.  Daughter-in-law will let me know if symptoms worsen.  - UA Macroscopic with reflex to Microscopic and Culture - Clinic Collect    Recurrent major depressive disorder, remission status unspecified (H)  Stable.    Coronary artery disease due to lipid rich plaque  Follow-up with cardiology as scheduled.    Doug Blum is a 54 year old, presenting for the following health issues:  Hospital discharge follow-up.        10/14/2024    10:08 AM   Additional Questions   Roomed by [place]   Accompanied by Daughter in law : Billy     Was admitted on 9/26/2024 due to acute on chronic respiratory distress.  She was discharged on 10/1/2024.  No new medication added per chart review.  Chest CT showed no new acute findings.  Patient was tested positive with COVID 2 weeks before hospital admission.  She is complaining of fatigue and lower abdominal pain for the past few days.  No diarrhea.  No blood in the stool.  No burning with urination.       Hospital Follow-up Visit:    Hospital/Nursing Home/IP Rehab Facility: [facility]  Date of Admission: 9/26/2024  Date of Discharge: 10/1/2024  Reason(s) for Admission: Acute on chronic respiratory distress.  Was the patient in the ICU or did the patient experience delirium during hospitalization?  No  Do you have any other stressors you would like to discuss with your provider? No    Problems taking  medications regularly:  None  Medication changes since discharge: None  Problems adhering to non-medication therapy:  None    Summary of hospitalization:  [facility] discharge summary reviewed  Diagnostic Tests/Treatments reviewed.  Follow up needed: none  Other Healthcare Providers Involved in Patient s Care:          Pulmonology  Update since discharge: stable.       MED REC REQUIRED  Post Medication Reconciliation Status:  Discharge medications reconciled, continue medications without change     Plan of care communicated with patient and daughter-in-law.          Review of Systems  CONSTITUTIONAL: NEGATIVE for fever, chills, change in weight  CV: NEGATIVE for chest pain/chest pressure      Objective    /84 (BP Location: Left arm, Patient Position: Sitting, Cuff Size: Adult Regular)   Pulse 98   Temp 98.3  F (36.8  C) (Oral)   Resp 12   Ht 1.524 m (5')   Wt 56.5 kg (124 lb 8 oz)   SpO2 100%   BMI 24.31 kg/m    Body mass index is 24.31 kg/m .  Physical Exam   GENERAL: alert and no distress  NECK: Supple  RESP: Mild wheezing both lung fields, no crackles or rhonchi.  CV: regular rate and rhythm, normal S1 S2, no S3 or S4, no murmur, click or rub, no peripheral edema  ABDOMEN: bowel sounds normal and mild tenderness suprapubic area.  No rebound or guarding.  BACK: no CVA tenderness, no paralumbar tenderness  PSYCH: mentation appears normal    This transcription uses voice recognition software, which may contain typographical errors.    The longitudinal plan of care for the diagnosis(es)/condition(s) as documented were addressed during this visit. Due to the added complexity in care, I will continue to support Kulwant in the subsequent management and with ongoing continuity of care.          Signed Electronically by: Adrien Cardoza MD

## 2024-10-14 NOTE — PROGRESS NOTES
"  {PROVIDER CHARTING PREFERENCE:673978}    Subjective   Kulwant is a 54 year old, presenting for the following health issues:  Med Check , Abdominal Pain, and Rectal Problem      10/14/2024    10:08 AM   Additional Questions   Roomed by [place]y [place]   Accompanied by Daughter in law : Billy YOUNG        Hospital Follow-up Visit:    Hospital/Nursing Home/IP Rehab Facility: {INPT AND SNF DISCHARGE:613733}  Date of Admission: ***  Date of Discharge: ***  Reason(s) for Admission: ***  Was the patient in the ICU or did the patient experience delirium during hospitalization?  {No_YES (delirium):642605}  Do you have any other stressors you would like to discuss with your provider? { :174377}    Problems taking medications regularly:  {:107709}  Medication changes since discharge: {:454973}  Problems adhering to non-medication therapy:  {:907076}    Summary of hospitalization:  {:340278}  Diagnostic Tests/Treatments reviewed.  Follow up needed: {:631720:}  Other Healthcare Providers Involved in Patient s Care:         {those currently involved after discharge:064516::\"None\"}  Update since discharge: {:985582} {TIP  Include information from family/caregivers, SNF, Care Coordination :223296}        Plan of care communicated with {:507744}     {Reference  Coding guidelines- Moderate Complexity F2F/Video within 7 - 14 days of discharge [id number], High Complexity F2F/Video within 7 days [id number] or [id number] days [id number] :101454}      {additonal problems for provider to add (Optional):762583}    {ROS Picklists (Optional):896061}      Objective    /84 (BP Location: Left arm, Patient Position: Sitting, Cuff Size: Adult Regular)   Pulse 98   Temp 98.3  F (36.8  C) (Oral)   Resp 12   Ht 1.524 m (5')   Wt 56.5 kg (124 lb 8 oz)   SpO2 100%   BMI 24.31 kg/m    Body mass index is 24.31 kg/m .  Physical Exam   {Exam List (Optional):527263}    {Diagnostic Test Results (Optional):778034}        Signed Electronically by: Adrien Cardoza, " MD  {Email feedback regarding this note to [email or web address]   :348834}

## 2024-10-20 DIAGNOSIS — G43.719 INTRACTABLE CHRONIC MIGRAINE WITHOUT AURA AND WITHOUT STATUS MIGRAINOSUS: ICD-10-CM

## 2024-10-20 DIAGNOSIS — G44.40 MEDICATION OVERUSE HEADACHE: ICD-10-CM

## 2024-10-21 ENCOUNTER — OFFICE VISIT (OUTPATIENT)
Dept: NEUROLOGY | Facility: CLINIC | Age: 54
End: 2024-10-21
Payer: COMMERCIAL

## 2024-10-21 VITALS
WEIGHT: 127 LBS | DIASTOLIC BLOOD PRESSURE: 73 MMHG | HEART RATE: 102 BPM | SYSTOLIC BLOOD PRESSURE: 111 MMHG | HEIGHT: 60 IN | BODY MASS INDEX: 24.94 KG/M2

## 2024-10-21 DIAGNOSIS — G43.719 INTRACTABLE CHRONIC MIGRAINE WITHOUT AURA AND WITHOUT STATUS MIGRAINOSUS: ICD-10-CM

## 2024-10-21 DIAGNOSIS — R79.0 LOW FERRITIN: ICD-10-CM

## 2024-10-21 DIAGNOSIS — R25.2 LEG CRAMPS: ICD-10-CM

## 2024-10-21 DIAGNOSIS — R42 VERTIGO: Primary | ICD-10-CM

## 2024-10-21 PROCEDURE — 99214 OFFICE O/P EST MOD 30 MIN: CPT | Performed by: PSYCHIATRY & NEUROLOGY

## 2024-10-21 RX ORDER — MECLIZINE HYDROCHLORIDE 25 MG/1
25 TABLET ORAL 3 TIMES DAILY PRN
Qty: 90 TABLET | Refills: 1 | Status: SHIPPED | OUTPATIENT
Start: 2024-10-21

## 2024-10-21 RX ORDER — FERROUS SULFATE 325(65) MG
325 TABLET ORAL
Qty: 90 TABLET | Refills: 4 | Status: SHIPPED | OUTPATIENT
Start: 2024-10-21

## 2024-10-21 RX ORDER — TIZANIDINE 2 MG/1
2 TABLET ORAL 3 TIMES DAILY PRN
Qty: 270 TABLET | Refills: 1 | Status: SHIPPED | OUTPATIENT
Start: 2024-10-21

## 2024-10-21 NOTE — TELEPHONE ENCOUNTER
Refill request for: amitriptyline 50mg   Directions: : Take 1 tablet (50 mg) by mouth at bedtime     LOV: 10/21/24  NOV: Due April 2025    90 day supply with 1 refills Medication T'd for review and signature    Urvashi Saha LPN on 10/21/2024 at 1:18 PM

## 2024-10-21 NOTE — NURSING NOTE
Chief Complaint   Patient presents with    Leg cramps     Pt is still having leg cramps. Has leg cramps in bilat legs, comes and goes.    Extremity Weakness     She has noticed left sided weakness for the past 2 months     Urvashi Saha LPN on 10/21/2024 at 12:52 PM

## 2024-10-21 NOTE — PROGRESS NOTES
NEUROLOGY OUTPATIENT PROGRESS NOTE  Oct 21, 2024     CHIEF COMPLAINT/REASON FOR VISIT/REASON FOR CONSULT  Patient presents with:  Leg cramps: Pt is still having leg cramps. Has leg cramps in bilat legs, comes and goes.  Extremity Weakness: She has noticed left sided weakness for the past 2 months    REASON FOR CONSULTATION- Multiple issues    REFERRAL SOURCE   Referred Self  CC  Referred Self    HISTORY OF PRESENT ILLNESS  Kulwant Amin is a 54 year old female seen today for evaluation of multiple issues.    In 2021 she was found to have a right pontine stroke.  Presenting symptoms of vertigo.  No clear etiology for the stroke was identified it was thought to be lacunar.  She does have a history of hypertension, hyperlipidemia, diabetes.  She was supposed to be on Plavix though currently is only on aspirin.  No recurrence of stroke symptoms.  She is presented to the ER since then with vertigo symptoms and her imaging studies have been negative    1.  She reports that the vertigo is there all the time.  She is using meclizine which she finds some benefit with. She has done vestibular rehab without any improvement.    2.  Further complains of headaches.  These are around-the-clock.  He is taking Tylenol every 8 hours to get rid of the headaches.  Headaches are more frontal.  They can be throbbing in nature with photophobia and phonophobia.  She does continue visual auras as well.  She is on amitriptyline at nighttime.  She feels that it might be helping with the headaches.  Does not get good sleep due to COPD.  Headaches started after her stroke.    3.  No other new strokelike symptoms.    4.  Does complain of some lightheadedness especially during the exam today.  Has been put on hydrochlorothiazide by her primary care provider.    5/24/23  Patient returns today  1.  No stroke symptoms.  Is tolerating her stroke medications  2.  Continues to have continuous headache 24/7.  Occasionally it will go away but then  come back.  Is still overusing Tylenol.  Generally uses 1 tablet a day but can use up to 3 tablets a day.  Amitriptyline did help with the headaches and now they are not getting as severe as compared to before.  3.  Still feels dizzy.  Drinking plenty of water.    8/10/23  Patient returns today  1.  No further strokelike symptoms.  Continues to complain of some dizziness.  2.  Headaches have improved with use of the amitriptyline.  She continues to have a very mild continuous headache all the time.  She is only using the Tylenol 1-2 times a week.  The more severe headaches are only 1-2 times a week as well.  Amitriptyline does help without side effects though she would like to try other medications  3.  Drinks plenty of water.  No history of kidney stones.    10/26/23  Patient returns to the  1.  No further strokelike symptoms.  2.  Continues to have headaches every day.  Some associated dizziness.  She did try the Topamax and that was thought to be causing muscle cramps and as a result she stopped it.  No benefit with the headaches.  3.  Muscle cramps in the legs are still present.  When asked where she has pain in her legs she describes all over with stretching her knee and she might have knee pain.  She also reports bilateral leg weakness.  Does have chronic back pain that has not really worsened.  Some neck stiffness as well.  No other new neurological symptoms.    1/16/24  Patient returns today.  1.  No further strokelike symptoms.  Has had been having a lot of left leg weakness.  Was seen in the ER and put on some steroids.  This is not really helped.  There is pain and spasms in the left leg along with weakness.  Continues to have back pain.  Has not been able to do the MRIs  2.  Headaches are better and she is having 5 headaches a month.  Emgality does help with the headaches.  Tylenol does help with the headaches and also prevents headaches for few days.  3.  No further symptoms.    3/18/24  Patient  demonstrated  1.  No further strokelike symptoms  2.  Continues to have weakness in her legs though muscle cramps and spasms are much better with use of tizanidine.  Is working with physical therapy which is also helping.  3.  Continues to stay on the Emgality and the amitriptyline.  Headaches are 2-3 times a week but much less severe compared to before.  No side effects with the medication.  Headaches are much more tolerable and does not want more medications  No other new concerns.    10/21/24  Patient returns today  1.  Headaches have gotten worse that now she is not taking her Emgality because she ran out of the medication.  When she was on the Emgality she was having 1-2 headaches a week.  Headaches were not very severe and Tylenol would easily abort them.  No other new provoking factors.  Remains on the amitriptyline as well  2.  Does have leg cramps for which she was using tizanidine.  It is no longer listed on that medication list though she reports that she is still using it.  Will represcribe.  3.  No further stroke symptoms  4.  Continues to have vertigo.  Discussed lack of treatment options for this.  They would like a refill on the meclizine  No other new concerns.    Previous history is reviewed and this is unchanged.    PAST MEDICAL/SURGICAL HISTORY  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    Microcytic anemia 11/17/2019    S/P coronary artery stent placement 02/02/2018    TB lung, latent      9 mos INH     Patient Active Problem List   Diagnosis    Pulmonary nodule, right    Environmental allergies    TB lung, latent    COPD (chronic obstructive pulmonary disease)/Asthma     Essential hypertension    Cataracts, bilateral    Corneal opacity    Irregular astigmatism    Anxiety    Chronic nonintractable headache, unspecified headache type    Coronary artery disease due to lipid rich plaque    Dizziness    Hyperlipidemia    Moderate persistent asthma    Unspecified visual loss    GERD (gastroesophageal reflux disease)    Dental caries    H/O arterial ischemic stroke    Constipation    Dysphagia    Chronic abdominal pain    Insomnia    FLACO (obstructive sleep apnea)- mild (AHI 14)    Type 2 diabetes mellitus treated with insulin (H)    Major depression, recurrent (H)    Chronic low back pain without sciatica, unspecified back pain laterality    Shortness of breath   Significant for arthritis, diabetes, high blood pressure, hyperlipidemia, stroke    FAMILY HISTORY  Family History   Problem Relation Age of Onset    Other - See Comments Mother          of an intestinal problem    Ulcers Father          of gastritis    Breast Cancer No family hx of     Ovarian Cancer No family hx of     Colon Cancer No family hx of    Negative for stroke    SOCIAL HISTORY  Social History     Tobacco Use    Smoking status: Former     Current packs/day: 0.00     Average packs/day: 1 pack/day for 30.0 years (30.0 ttl pk-yrs)     Types: Cigarettes     Start date: 1973     Quit date: 2003     Years since quittin.8     Passive exposure: Never    Smokeless tobacco: Never    Tobacco comments:     No passive exposure   Vaping Use    Vaping status: Never Used   Substance Use Topics    Alcohol use: No    Drug use: No       SYSTEMS REVIEW  Twelve-system ROS was done and other than the HPI this was negative except for back pain, joint pain, numbness and tingling, weakness/paralysis, difficulty walking,  balance/coordination problems, dizziness, headaches, anxiety, cardiac/heart problems, respiratory problems, snoring loudly.  No new symptoms/issues.    MEDICATIONS  Current Outpatient Medications   Medication Sig Dispense Refill    acetaminophen (TYLENOL) 500 MG tablet TAKE 1 PILL BY MOUTH EVERY 6 HOURS AS NEEDED FOR PAIN 100 tablet 10    acetaminophen-codeine (TYLENOL #3) 300-30 MG per tablet Take 1 tablet by mouth every 6 hours as needed for severe pain 30 tablet 0    albuterol (PROAIR HFA/PROVENTIL HFA/VENTOLIN HFA) 108 (90 Base) MCG/ACT inhaler Inhale 2 puffs into the lungs every 4 hours as needed for shortness of breath 18 g 11    albuterol (PROVENTIL) (2.5 MG/3ML) 0.083% neb solution Take 1 vial (2.5 mg) by nebulization every 6 hours as needed for shortness of breath, wheezing or cough 360 mL 11    amitriptyline (ELAVIL) 50 MG tablet Take 1 tablet (50 mg) by mouth at bedtime 90 tablet 1    aspirin 81 MG EC tablet Take 81 mg by mouth daily.      atorvastatin (LIPITOR) 80 MG tablet Take 1 tablet (80 mg) by mouth daily 90 tablet 3    calcium carbonate (TUMS) 500 MG chewable tablet Take 1 chew tab by mouth 2 times daily as needed for heartburn.      colchicine (COLCRYS) 0.6 MG tablet Take 1 tablet (0.6 mg) by mouth daily 90 tablet 3    diclofenac (VOLTAREN) 1 % topical gel Apply 4 g topically 4 times daily 350 g 3    ferrous sulfate (FEROSUL) 325 (65 Fe) MG tablet Take 1 tablet (325 mg) by mouth daily (with breakfast). 90 tablet 4    fluticasone-vilanterol (BREO ELLIPTA) 200-25 MCG/ACT inhaler Inhale 1 puff into the lungs daily 60 each 11    gabapentin (NEURONTIN) 600 MG tablet TAKE 1 PILL (600 MG) BY MOUTH 3 TIMES DAILY 90 tablet 3    galcanezumab-gnlm (EMGALITY) 120 MG/ML injection Inject 2 mLs (240 mg) subcutaneously every 28 days. Loading dose. 2 mL 0    galcanezumab-gnlm (EMGALITY) 120 MG/ML injection Inject 1 mL (120 mg) subcutaneously every 28 days. 1 mL 11    guaiFENesin-codeine (ROBITUSSIN AC) 100-10  MG/5ML solution Take 5 mLs by mouth every 4 hours as needed for cough. 118 mL 0    hydrochlorothiazide (MICROZIDE) 12.5 MG capsule Take 1 capsule (12.5 mg) by mouth every morning 90 capsule 3    insulin aspart (NOVOLOG PEN) 100 UNIT/ML pen Inject 16 Units subcutaneously 2 times daily (before meals). 45 mL 3    insulin glargine (LANTUS PEN) 100 UNIT/ML pen Inject 40 Units subcutaneously every morning. 45 mL 3    ipratropium - albuterol 0.5 mg/2.5 mg/3 mL (DUONEB) 0.5-2.5 (3) MG/3ML neb solution Take 1 vial (3 mLs) by nebulization every 6 hours as needed for shortness of breath, wheezing or cough. 180 mL 3    Lidocaine (LIDOCARE) 4 % Patch Place 1 patch onto the skin every 24 hours To prevent lidocaine toxicity, patient should be patch free for 12 hrs daily. 30 patch 1    meclizine (ANTIVERT) 25 MG tablet Take 1 tablet (25 mg) by mouth 3 times daily as needed for dizziness. 90 tablet 1    metFORMIN (GLUCOPHAGE XR) 500 MG 24 hr tablet Take 2 tablets (1,000 mg) by mouth 2 times daily (with meals) 360 tablet 3    metoprolol tartrate (LOPRESSOR) 25 MG tablet TAKE 1 TABLET (25 MG TOTAL) BY MOUTH 2 (TWO) TIMES A DAY FOR BLOOD PRESSURE 180 tablet 3    mometasone-formoterol (DULERA) 200-5 MCG/ACT inhaler Inhale 2 puffs into the lungs.      montelukast (SINGULAIR) 10 MG tablet Take 1 tablet (10 mg) by mouth at bedtime 30 tablet 11    naproxen (NAPROSYN) 500 MG tablet Take 500 mg by mouth.      NEXIUM 40 MG DR capsule Take 40 mg by mouth every morning (before breakfast).      polyethylene glycol (MIRALAX) 17 GM/Dose powder Take 17 g (1 Capful) by mouth daily 238 g 1    sennosides (SENOKOT) 8.6 MG tablet Take 1 tablet by mouth daily as needed for constipation. 30 tablet 3    SPIRIVA RESPIMAT 2.5 MCG/ACT inhaler INHALE 2 PUFFS INTO THE LUNGS DAILY 4 g 0    sucralfate (CARAFATE) 1 GM/10ML suspension Take 10 mLs (1 g) by mouth 4 times daily. 3600 mL 1    tiZANidine (ZANAFLEX) 2 MG tablet Take 1 tablet (2 mg) by mouth 3 times daily  as needed for muscle spasms. 270 tablet 1    Vitamin D3 (VITAMIN D) 10 MCG (400 UNIT) tablet Take by mouth daily.      Continuous Glucose Sensor (FREESTYLE MONICA 2 SENSOR) MISC 1 EACH EVERY 14 DAYS USE 1 SENSOR EVERY 14 DAYS. USE TO READ BLOOD SUGARS PER 'S INSTRUCTIONS. 2 each 11    insulin pen needle (32G X 4 MM) 32G X 4 MM miscellaneous Use 3 pen needles daily or as directed. 300 each 4    vitamin D3 (CHOLECALCIFEROL) 10 MCG (400 UNIT) capsule Take 1 capsule (400 Units) by mouth daily. 90 capsule 3     Current Facility-Administered Medications   Medication Dose Route Frequency Provider Last Rate Last Admin    tezepelumab-ekko (TEZSPIRE) injection 210 mg  210 mg Subcutaneous q28 days Elizabeth Marie MD   210 mg at 03/28/24 1039        PHYSICAL EXAMINATION  VITALS: /73 (BP Location: Right arm, Patient Position: Sitting)   Pulse 102   Ht 1.524 m (5')   Wt 57.6 kg (127 lb)   BMI 24.80 kg/m    GENERAL: Healthy appearing, alert, no acute distress, normal habitus.  CARDIOVASCULAR: Extremities warm and well perfused. Pulses present.   NEUROLOGICAL:  Patient is awake and oriented to self, place and time.  Attention span is normal.  Memory is grossly intact.  Language is fluent and follows commands appropriately.  Appropriate fund of knowledge. Cranial nerves 2-12 are intact. There is no pronator drift.  Motor exam shows  4/5 strength in all extremities-effort dependent.  Generalized weakness due to pain all over.  Tone is symmetric bilaterally in upper and lower extremities.  Previously reflexes are symmetric and 1+ in upper extremities and lower extremities. Sensory exam is grossly intact to light touch, pin prick and vibration.  Finger to nose and heel to shin is without dysmetria.  Romberg is negative.  Gait is normal and the patient is able to do tandem walk and walk on toes and heels with mild difficulty.  Orthostatic vitals    Exam shows some worsening left hip flexion weakness related to  pain.  Possibly this is more knee pain than neurological pain.  No change in exam.  Continues to have weakness in the legs.    DIAGNOSTICS  MRI 2022  IMPRESSION:  1.  No acute intracranial abnormalities identified.  2.  Minor chronic small vessel ischemic change, otherwise unremarkable contrast-enhanced MRI of the brain.    MRA                                                               IMPRESSION:  1.  Normal MRA [place].      MRA neck  IMPRESSION:  1.  Normal neck MRA.    Cardiac event monitor    ECHo  Left ventricular size, wall motion and function are normal. The ejection  fraction is 55-60%.  There is borderline anterior, septal, and apical wall hypokinesis.  Normal right ventricle size and systolic function.  No hemodynamically significant valvular abnormalities on 2D or color flow  Imaging.      CTA 2021  HEAD CT:  1.  No evidence of acute hemorrhage, mass effect, or acute vascular territorial infarction.  2.  Severe left sphenoid sinus disease.     HEAD CTA:   1.  No evidence of proximal large vessel occlusion or flow-limiting stenosis.  2.  Dorsally directed 2 mm contour irregularity arising from the distal left supraclinoid ICA may represent a tiny posterior communicating artery aneurysm.     NECK CTA:  1.  No hemodynamically significant stenosis based on NASCET criteria.  2.  Mild left V1 segment stenosis.  3.  No evidence for dissection.      MRI 2020  IMPRESSION:  MRI BRAIN:  1. No finding for acute infarct, hemorrhage, or mass.  2. There are a few very small foci of FLAIR hyperintensity within the cerebral white matter, nonspecific but compatible with small areas of gliosis and/or chronic myelin loss.     MRA [place]:  1. Congenital variation of the [place] without vascular cutoff, aneurysm, or flow-limiting stenosis.      MRI 2021  HEAD MRI:   1.  Tiny linear focus of diffusion restriction within the right dorsal diomedes suspicious for acute small vessel infarct. This is new  compared to 12/07/2020.  2.  No hemorrhagic transformation or mass effect. No additional infarct identified.  3.  Mild presumed microvascular ischemic change within the cerebral white matter.     HEAD MRA:   1.  No aneurysm, high flow AVM or significant stenosis identified.  2.  Variant [place] anatomy as above.     NECK MRA:  1.  Evaluation degraded by suboptimal timing of the contrast bolus and significant venous contamination the gadolinium bolus MRA.  2.  No hemodynamically significant stenosis in the carotid circulation by NASCET criteria.  3.  Poor visualization of the left vertebral artery origin and proximal left V1 segment. This may be artifactual, but it is difficult to exclude high-grade stenosis in this region.      MRI 2021  IMPRESSION:  1.  No mass, hemorrhage, or recent infarct identified.  2.  Diffusion abnormality involving the right ventral diomedes seen on 01/13/2021 is no longer identified. No definite residual signal abnormality in this location to confirm prior infarct. This may relate to small size and slice selection.  3.  Inflammatory changes of the paranasal sinuses including bilateral maxillary air-fluid levels. Correlate with clinical evidence for sinusitis.      RELEVANT LABS  Component      Latest Ref Rng & Units 11/30/2022 2/5/2023   WBC      4.0 - 11.0 10e3/uL  9.3   RBC Count      3.80 - 5.20 10e6/uL  4.59   Hemoglobin      11.7 - 15.7 g/dL  11.8   Hematocrit      35.0 - 47.0 %  38.1   MCV      78 - 100 fL  83   MCH      26.5 - 33.0 pg  25.7 (L)   MCHC      31.5 - 36.5 g/dL  31.0 (L)   RDW      10.0 - 15.0 %  15.1 (H)   Platelet Count      150 - 450 10e3/uL  228   % Neutrophils      %  69   % Lymphocytes      %  14   % Monocytes      %  6   % Eosinophils      %  11   % Basophils      %  0   % Immature Granulocytes      %  0   NRBCs per 100 WBC      <1 /100  0   Absolute Neutrophils      1.6 - 8.3 10e3/uL  6.4   Absolute Lymphocytes      0.8 - 5.3 10e3/uL  1.3   Absolute  Monocytes      0.0 - 1.3 10e3/uL  0.6   Absolute Eosinophils      0.0 - 0.7 10e3/uL  1.0 (H)   Absolute Basophils      0.0 - 0.2 10e3/uL  0.0   Absolute Immature Granulocytes      <=0.4 10e3/uL  0.0   Absolute NRBCs      10e3/uL  0.0   Sodium      136 - 145 mmol/L  138   Potassium      3.4 - 5.3 mmol/L  4.2   Chloride      98 - 107 mmol/L  102   Carbon Dioxide (CO2)      22 - 29 mmol/L  25   Anion Gap      7 - 15 mmol/L  11   Urea Nitrogen      6.0 - 20.0 mg/dL  10.6   Creatinine      0.51 - 0.95 mg/dL  0.58   Calcium      8.6 - 10.0 mg/dL  9.5   Glucose      70 - 99 mg/dL  156 (H)   GFR Estimate      >60 mL/min/1.73m2  >90   Hemoglobin A1C      0.0 - 5.6 % 8.6 (H)      OUTSIDE RECORDS  Outside referral notes and chart notes were reviewed and pertinent information has been summarized (in addition to the HPI):- Dr. Multani notes reviewed      ER notes above reviewed    Component      Latest Ref Rng 9/14/2023  9:21 AM 10/1/2023  9:42 AM   Sodium      135 - 145 mmol/L  136    Potassium      3.4 - 5.3 mmol/L  4.3    Chloride      98 - 107 mmol/L  102    Carbon Dioxide (CO2)      22 - 29 mmol/L  22    Anion Gap      7 - 15 mmol/L  12    Urea Nitrogen      6.0 - 20.0 mg/dL  9.2    Creatinine      0.51 - 0.95 mg/dL  0.53    GFR Estimate      >60 mL/min/1.73m2  >90    Calcium      8.6 - 10.0 mg/dL  9.1    Glucose      70 - 99 mg/dL  201 (H)    WBC      4.0 - 11.0 10e3/uL  7.2    RBC Count      3.80 - 5.20 10e6/uL  4.73    Hemoglobin      11.7 - 15.7 g/dL  11.6 (L)    Hematocrit      35.0 - 47.0 %  38.4    MCV      78 - 100 fL  81    MCH      26.5 - 33.0 pg  24.5 (L)    MCHC      31.5 - 36.5 g/dL  30.2 (L)    RDW      10.0 - 15.0 %  15.7 (H)    Platelet Count      150 - 450 10e3/uL  230    CRP Inflammation      <5.00 mg/L 3.20        Legend:  (H) High  (L) Low    LABS  Component      Latest Ref Rng 11/7/2023  10:46 AM   Sodium      135 - 145 mmol/L 138    Potassium      3.4 - 5.3 mmol/L 4.0    Carbon Dioxide (CO2)      22 -  29 mmol/L 25    Anion Gap      7 - 15 mmol/L 13    Urea Nitrogen      6.0 - 20.0 mg/dL 13.3    Creatinine      0.51 - 0.95 mg/dL 0.59    GFR Estimate      >60 mL/min/1.73m2 >90    Calcium      8.6 - 10.0 mg/dL 9.3    Chloride      98 - 107 mmol/L 100    Glucose      70 - 99 mg/dL 184 (H)    Alkaline Phosphatase      35 - 104 U/L 116 (H)    AST      0 - 45 U/L 48 (H)    ALT      0 - 50 U/L 41    Protein Total      6.4 - 8.3 g/dL 7.1    Albumin      3.5 - 5.2 g/dL 4.1    Bilirubin Total      <=1.2 mg/dL 0.3    KATY-1 (Histidyl-tRNA Synthetase) Bere IgG      0 - 40 AU/mL 1    PL-12 (alanyl-tRNA synthetase) Antibody      Negative  Negative    PL-7 (threonyl-tRNA synthetase) Antibody      Negative  Negative    EJ (glycyl - tRNA synthetase) Antibody      Negative  Negative    OJ (isoleucyl-tRNA synthetase) Antibody      Negative  Negative    SRP (Signal Recognition Particle) Antibody      Negative  Negative    Mi-2 (nuclear helicase protein) Antibody      Negative  Negative    P155/140 (TIF1-gamma) Antibody      Negative  Negative    TIF-1 Gamma Antibody      Negative  Negative    SAE1 (SUMO activating enzyme) Bere      Negative  Negative    MDA5 (CADM 140) Bere      Negative  Negative    NXP-2 (Nuclear matrix protein 2) Bere      Negative  Negative    Myositis Interp See Note    Albumin Fraction      3.7 - 5.1 g/dL 3.8    Alpha 1 Fraction      0.2 - 0.4 g/dL 0.3    Alpha 2 Fraction      0.5 - 0.9 g/dL 0.7    Beta Fraction      0.6 - 1.0 g/dL 0.9    Gamma Fraction      0.7 - 1.6 g/dL 1.0    Monoclonal Peak      <=0.0 g/dL 0.0    ELP Interpretation: Essentially normal electrophoretic pattern. No obvious monoclonal proteins seen. Pathologic significance requires clinical correlation. Marlene Zamudio M.D., Ph.D.    Vitamin B12      232 - 1,245 pg/mL 602    Vitamin B1 Whole Blood Level      70 - 180 nmol/L 111    TSH      0.30 - 4.20 uIU/mL 0.35    Hemoglobin A1C      0.0 - 5.6 % 11.1 (H)    Magnesium      1.7 - 2.3 mg/dL 1.7    CK  Total      26 - 192 U/L 59    Ferritin      11 - 328 ng/mL 26    Immunofixation ELP No monoclonal protein seen on immunofixation. Pathologic significance requires clinical correlation. Marlene Zamudio M.D., Ph.D.    Total Protein Serum for ELP      6.4 - 8.3 g/dL 6.7       Legend:  (H) High  ER notes reviewed.    CLINICAL INTERPRETATION:  This is an abnormal nerve conduction and EMG study.  The needle study shows increased spontaneous activity in bilateral L4/L5/S1 muscle groups.  This can be seen in patients with lumbar radiculopathies.  Further clinical correlation is needed.     MRI L spine 2021-images reviewed.  IMPRESSION:   1.  Small left paracentral L5-S1 disc protrusion. Mild left L5-S1 foraminal stenosis.     2.  Minor degenerative disc changes at L4-L5.     MRI L spine-images reviewed  IMPRESSION:  1.  Moderate lumbar spondylosis at L4-L5 and L5-S1.  2.  No high-grade lumbar spinal canal narrowing.  3.  At L5-S1, there is mild left neural foraminal stenosis.    MRI C spine-images reviewed  IMPRESSION:  1.  Mild cervical spondylosis without significant cervical spinal canal narrowing.  2.  Mild left neural foraminal stenosis at C4-C5.       IMPRESSION/REPORT/PLAN  Cerebrovascular accident of right pontine structure (H)  Medication overuse headache  Vertigo  Light headed  Intractable chronic migraine without aura and without status migrainosus  Bilateral leg weakness/muscle cramps versus knee pain  Left leg weakness  Low ferritin  Lumbar spinal stenosis  Medication noncompliance    This is a 54 year old female history of right pontine stroke thought to be due to vascular risk factors.  Currently she is on aspirin for stroke prevention.  Further management of vascular risk factors per primary care.  No new stroke symptoms.  Stable.  Recommend staying active.    She does complain of vertigo and most likely this is related to her stroke.  Meclizine is currently helping and should continue.  She is tried vestibular  rehab without any benefit.  Could be related to headaches also.  There might not be any further treatment options available for this.  Still present.  Refill meclizine today.    In terms of her headaches these are most suggestive of medication overuse headaches since she is overusing Tylenol.  Encourage her not to use the Tylenol more than 2-3 times per week; reemphasized this today.  With cutting down on the Tylenol there has been some improvement.  Remaining headaches would be classified as migrainous headaches.    Amitriptyline at higher doses is also helping.  Topamax was not effective/caused side effects.  Emgality is now also helping.  Continue Tylenol for abortive therapy.  Headaches worse due to not taking Emgality.  Medications refilled.    She does complain of some lightheadedness and orthostatic vitals were negative.  Most likely this is unrelated to the stroke.  Further management per primary care.  Encourage good hydration.  No change.  She is drinking plenty of water.  Monitor.    She does complain of new bilateral leg weakness/muscle cramps.  On exam she has generalized weakness related to her muscle pain.  Increased left leg weakness on exam today though this might be more knee pain than muscle pain.  EMG suggested a lumbar radiculopathy.  MRI L-spine shows moderate spinal stenosis which could be affecting the leg weakness/cramps.  Tizanidine is helping with the leg cramps and will continue.  She is already doing physical therapy.  MRI C-spine was noncontributory.  Her ferritin was low and iron replacement is also helping. Blood work/EMG was negative for myopathy.  Refilled medications.  Unclear if she is taking it or not though overall symptoms are stable.    For the left knee pain previously she was referred to orthopedics.    Return back in 6 months.      -     amitriptyline (ELAVIL) 50 MG tablet; Take 1 tablet (50 mg) by mouth At Bedtime  -     galcanezumab-gnlm (EMGALITY) 120 MG/ML injection;  Inject 1 mL (120 mg) Subcutaneous every 28 days  -     tiZANidine (ZANAFLEX) 2 MG tablet; Take 1 tablet (2 mg) by mouth 3 times daily as needed for muscle spasms  -     ferrous sulfate (FEROSUL) 325 (65 Fe) MG tablet; Take 1 tablet (325 mg) by mouth daily (with breakfast)  - Continue physical therapy for lumbar spinal stenosis    Return in about 6 months (around 4/21/2025) for In-Clinic Visit (must).    Over 30 minutes were spent coordinating the care for the patient on the day of the encounter.  This includes previsit, during visit and post visit activities as documented above.  Counseling patient.  Prescription management.  Multiple problems reviewed.  (Activities include but not inclusive of reviewing chart, reviewing outside records, reviewing labs and imaging study results as well as the images, patient visit time including getting history and exam,  use if applicable, review of test results with the patient and coming up with a plan in a shared model, counseling patient and family, education and answering patient questions, EMR , EMR diagnosis entry and problem list management, medication reconciliation and prescription management if applicable, paperwork if applicable, printing documents and documentation of the visit activities.)        Guerrero Prescott MD  Neurologist  [facility]  Tel:- [phone]    This note was dictated using voice recognition software.  Any grammatical or context distortions are unintentional and inherent to the software.

## 2024-10-21 NOTE — LETTER
10/21/2024      Kulwant Amin  [address] [facility] [id number]      Dear Colleague,    Thank you for referring your patient, Kulwant Amin, to the [facility] [place]. Please see a copy of my visit note below.    NEUROLOGY OUTPATIENT PROGRESS NOTE  Oct 21, 2024     CHIEF COMPLAINT/REASON FOR VISIT/REASON FOR CONSULT  Patient presents with:  Leg cramps: Pt is still having leg cramps. Has leg cramps in bilat legs, comes and goes.  Extremity Weakness: She has noticed left sided weakness for the past 2 months    REASON FOR CONSULTATION- Multiple issues    REFERRAL SOURCE   Referred Self  CC  Referred Self    HISTORY OF PRESENT ILLNESS  Kulwant Amin is a 54 year old female seen today for evaluation of multiple issues.    In 2021 she was found to have a right pontine stroke.  Presenting symptoms of vertigo.  No clear etiology for the stroke was identified it was thought to be lacunar.  She does have a history of hypertension, hyperlipidemia, diabetes.  She was supposed to be on Plavix though currently is only on aspirin.  No recurrence of stroke symptoms.  She is presented to the ER since then with vertigo symptoms and her imaging studies have been negative    1.  She reports that the vertigo is there all the time.  She is using meclizine which she finds some benefit with. She has done vestibular rehab without any improvement.    2.  Further complains of headaches.  These are around-the-clock.  He is taking Tylenol every 8 hours to get rid of the headaches.  Headaches are more frontal.  They can be throbbing in nature with photophobia and phonophobia.  She does continue visual auras as well.  She is on amitriptyline at nighttime.  She feels that it might be helping with the headaches.  Does not get good sleep due to COPD.  Headaches started after her stroke.    3.  No other new strokelike symptoms.    4.  Does complain of some lightheadedness especially during the exam today.  Has  been put on hydrochlorothiazide by her primary care provider.    5/24/23  Patient returns today  1.  No stroke symptoms.  Is tolerating her stroke medications  2.  Continues to have continuous headache 24/7.  Occasionally it will go away but then come back.  Is still overusing Tylenol.  Generally uses 1 tablet a day but can use up to 3 tablets a day.  Amitriptyline did help with the headaches and now they are not getting as severe as compared to before.  3.  Still feels dizzy.  Drinking plenty of water.    8/10/23  Patient returns today  1.  No further strokelike symptoms.  Continues to complain of some dizziness.  2.  Headaches have improved with use of the amitriptyline.  She continues to have a very mild continuous headache all the time.  She is only using the Tylenol 1-2 times a week.  The more severe headaches are only 1-2 times a week as well.  Amitriptyline does help without side effects though she would like to try other medications  3.  Drinks plenty of water.  No history of kidney stones.    10/26/23  Patient returns to the  1.  No further strokelike symptoms.  2.  Continues to have headaches every day.  Some associated dizziness.  She did try the Topamax and that was thought to be causing muscle cramps and as a result she stopped it.  No benefit with the headaches.  3.  Muscle cramps in the legs are still present.  When asked where she has pain in her legs she describes all over with stretching her knee and she might have knee pain.  She also reports bilateral leg weakness.  Does have chronic back pain that has not really worsened.  Some neck stiffness as well.  No other new neurological symptoms.    1/16/24  Patient returns today.  1.  No further strokelike symptoms.  Has had been having a lot of left leg weakness.  Was seen in the ER and put on some steroids.  This is not really helped.  There is pain and spasms in the left leg along with weakness.  Continues to have back pain.  Has not been able to do  the MRIs  2.  Headaches are better and she is having 5 headaches a month.  Emgality does help with the headaches.  Tylenol does help with the headaches and also prevents headaches for few days.  3.  No further symptoms.    3/18/24  Patient demonstrated  1.  No further strokelike symptoms  2.  Continues to have weakness in her legs though muscle cramps and spasms are much better with use of tizanidine.  Is working with physical therapy which is also helping.  3.  Continues to stay on the Emgality and the amitriptyline.  Headaches are 2-3 times a week but much less severe compared to before.  No side effects with the medication.  Headaches are much more tolerable and does not want more medications  No other new concerns.    10/21/24  Patient returns today  1.  Headaches have gotten worse that now she is not taking her Emgality because she ran out of the medication.  When she was on the Emgality she was having 1-2 headaches a week.  Headaches were not very severe and Tylenol would easily abort them.  No other new provoking factors.  Remains on the amitriptyline as well  2.  Does have leg cramps for which she was using tizanidine.  It is no longer listed on that medication list though she reports that she is still using it.  Will represcribe.  3.  No further stroke symptoms  4.  Continues to have vertigo.  Discussed lack of treatment options for this.  They would like a refill on the meclizine  No other new concerns.    Previous history is reviewed and this is unchanged.    PAST MEDICAL/SURGICAL HISTORY  Past Medical History:   Diagnosis Date     Acute asthma exacerbation 01/06/2020     Anxiety      Arthritis      Asthma exacerbation 11/19/2015     Chronic obstructive pulmonary disease, unspecified COPD type (H)      COPD (chronic obstructive pulmonary disease) (H)      Coronary artery disease due to lipid rich plaque      Depression      Epigastric pain 12/15/2021     Essential hypertension      GERD (gastroesophageal  reflux disease)      Infection due to  novel coronavirus 2022    positive with COVID-19 on January 3, 2022     Latent tuberculosis 2019     Microcytic anemia 2019     S/P coronary artery stent placement 2018     TB lung, latent     9 mos INH     Patient Active Problem List   Diagnosis     Pulmonary nodule, right     Environmental allergies     TB lung, latent     COPD (chronic obstructive pulmonary disease)/Asthma      Essential hypertension     Cataracts, bilateral     Corneal opacity     Irregular astigmatism     Anxiety     Chronic nonintractable headache, unspecified headache type     Coronary artery disease due to lipid rich plaque     Dizziness     Hyperlipidemia     Moderate persistent asthma     Unspecified visual loss     GERD (gastroesophageal reflux disease)     Dental caries     H/O arterial ischemic stroke     Constipation     Dysphagia     Chronic abdominal pain     Insomnia     FLACO (obstructive sleep apnea)- mild (AHI 14)     Type 2 diabetes mellitus treated with insulin (H)     Major depression, recurrent (H)     Chronic low back pain without sciatica, unspecified back pain laterality     Shortness of breath   Significant for arthritis, diabetes, high blood pressure, hyperlipidemia, stroke    FAMILY HISTORY  Family History   Problem Relation Age of Onset     Other - See Comments Mother          of an intestinal problem     Ulcers Father          of gastritis     Breast Cancer No family hx of      Ovarian Cancer No family hx of      Colon Cancer No family hx of    Negative for stroke    SOCIAL HISTORY  Social History     Tobacco Use     Smoking status: Former     Current packs/day: 0.00     Average packs/day: 1 pack/day for 30.0 years (30.0 ttl pk-yrs)     Types: Cigarettes     Start date: 1973     Quit date: 2003     Years since quittin.8     Passive exposure: Never     Smokeless tobacco: Never     Tobacco comments:     No passive exposure   Vaping Use      Vaping status: Never Used   Substance Use Topics     Alcohol use: No     Drug use: No       SYSTEMS REVIEW  Twelve-system ROS was done and other than the HPI this was negative except for back pain, joint pain, numbness and tingling, weakness/paralysis, difficulty walking, balance/coordination problems, dizziness, headaches, anxiety, cardiac/heart problems, respiratory problems, snoring loudly.  No new symptoms/issues.    MEDICATIONS  Current Outpatient Medications   Medication Sig Dispense Refill     acetaminophen (TYLENOL) 500 MG tablet TAKE 1 PILL BY MOUTH EVERY 6 HOURS AS NEEDED FOR PAIN 100 tablet 10     acetaminophen-codeine (TYLENOL #3) 300-30 MG per tablet Take 1 tablet by mouth every 6 hours as needed for severe pain 30 tablet 0     albuterol (PROAIR HFA/PROVENTIL HFA/VENTOLIN HFA) 108 (90 Base) MCG/ACT inhaler Inhale 2 puffs into the lungs every 4 hours as needed for shortness of breath 18 g 11     albuterol (PROVENTIL) (2.5 MG/3ML) 0.083% neb solution Take 1 vial (2.5 mg) by nebulization every 6 hours as needed for shortness of breath, wheezing or cough 360 mL 11     amitriptyline (ELAVIL) 50 MG tablet Take 1 tablet (50 mg) by mouth at bedtime 90 tablet 1     aspirin 81 MG EC tablet Take 81 mg by mouth daily.       atorvastatin (LIPITOR) 80 MG tablet Take 1 tablet (80 mg) by mouth daily 90 tablet 3     calcium carbonate (TUMS) 500 MG chewable tablet Take 1 chew tab by mouth 2 times daily as needed for heartburn.       colchicine (COLCRYS) 0.6 MG tablet Take 1 tablet (0.6 mg) by mouth daily 90 tablet 3     diclofenac (VOLTAREN) 1 % topical gel Apply 4 g topically 4 times daily 350 g 3     ferrous sulfate (FEROSUL) 325 (65 Fe) MG tablet Take 1 tablet (325 mg) by mouth daily (with breakfast). 90 tablet 4     fluticasone-vilanterol (BREO ELLIPTA) 200-25 MCG/ACT inhaler Inhale 1 puff into the lungs daily 60 each 11     gabapentin (NEURONTIN) 600 MG tablet TAKE 1 PILL (600 MG) BY MOUTH 3 TIMES DAILY 90  tablet 3     galcanezumab-gnlm (EMGALITY) 120 MG/ML injection Inject 2 mLs (240 mg) subcutaneously every 28 days. Loading dose. 2 mL 0     galcanezumab-gnlm (EMGALITY) 120 MG/ML injection Inject 1 mL (120 mg) subcutaneously every 28 days. 1 mL 11     guaiFENesin-codeine (ROBITUSSIN AC) 100-10 MG/5ML solution Take 5 mLs by mouth every 4 hours as needed for cough. 118 mL 0     hydrochlorothiazide (MICROZIDE) 12.5 MG capsule Take 1 capsule (12.5 mg) by mouth every morning 90 capsule 3     insulin aspart (NOVOLOG PEN) 100 UNIT/ML pen Inject 16 Units subcutaneously 2 times daily (before meals). 45 mL 3     insulin glargine (LANTUS PEN) 100 UNIT/ML pen Inject 40 Units subcutaneously every morning. 45 mL 3     ipratropium - albuterol 0.5 mg/2.5 mg/3 mL (DUONEB) 0.5-2.5 (3) MG/3ML neb solution Take 1 vial (3 mLs) by nebulization every 6 hours as needed for shortness of breath, wheezing or cough. 180 mL 3     Lidocaine (LIDOCARE) 4 % Patch Place 1 patch onto the skin every 24 hours To prevent lidocaine toxicity, patient should be patch free for 12 hrs daily. 30 patch 1     meclizine (ANTIVERT) 25 MG tablet Take 1 tablet (25 mg) by mouth 3 times daily as needed for dizziness. 90 tablet 1     metFORMIN (GLUCOPHAGE XR) 500 MG 24 hr tablet Take 2 tablets (1,000 mg) by mouth 2 times daily (with meals) 360 tablet 3     metoprolol tartrate (LOPRESSOR) 25 MG tablet TAKE 1 TABLET (25 MG TOTAL) BY MOUTH 2 (TWO) TIMES A DAY FOR BLOOD PRESSURE 180 tablet 3     mometasone-formoterol (DULERA) 200-5 MCG/ACT inhaler Inhale 2 puffs into the lungs.       montelukast (SINGULAIR) 10 MG tablet Take 1 tablet (10 mg) by mouth at bedtime 30 tablet 11     naproxen (NAPROSYN) 500 MG tablet Take 500 mg by mouth.       NEXIUM 40 MG DR capsule Take 40 mg by mouth every morning (before breakfast).       polyethylene glycol (MIRALAX) 17 GM/Dose powder Take 17 g (1 Capful) by mouth daily 238 g 1     sennosides (SENOKOT) 8.6 MG tablet Take 1 tablet by mouth  daily as needed for constipation. 30 tablet 3     SPIRIVA RESPIMAT 2.5 MCG/ACT inhaler INHALE 2 PUFFS INTO THE LUNGS DAILY 4 g 0     sucralfate (CARAFATE) 1 GM/10ML suspension Take 10 mLs (1 g) by mouth 4 times daily. 3600 mL 1     tiZANidine (ZANAFLEX) 2 MG tablet Take 1 tablet (2 mg) by mouth 3 times daily as needed for muscle spasms. 270 tablet 1     Vitamin D3 (VITAMIN D) 10 MCG (400 UNIT) tablet Take by mouth daily.       Continuous Glucose Sensor (FREESTYLE MONICA 2 SENSOR) [facility] 1 EACH EVERY 14 DAYS USE 1 SENSOR EVERY 14 DAYS. USE TO READ BLOOD SUGARS PER 'S INSTRUCTIONS. 2 each 11     insulin pen needle (32G X 4 MM) 32G X 4 MM miscellaneous Use 3 pen needles daily or as directed. 300 each 4     vitamin D3 (CHOLECALCIFEROL) 10 MCG (400 UNIT) capsule Take 1 capsule (400 Units) by mouth daily. 90 capsule 3     Current Facility-Administered Medications   Medication Dose Route Frequency Provider Last Rate Last Admin     tezepelumab-ekko (TEZSPIRE) injection 210 mg  210 mg Subcutaneous q28 days Elizabeth Marie MD   210 mg at 03/28/24 1039        PHYSICAL EXAMINATION  VITALS: /73 (BP Location: Right arm, Patient Position: Sitting)   Pulse 102   Ht 1.524 m (5')   Wt 57.6 kg (127 lb)   BMI 24.80 kg/m    GENERAL: Healthy appearing, alert, no acute distress, normal habitus.  CARDIOVASCULAR: Extremities warm and well perfused. Pulses present.   NEUROLOGICAL:  Patient is awake and oriented to self, place and time.  Attention span is normal.  Memory is grossly intact.  Language is fluent and follows commands appropriately.  Appropriate fund of knowledge. Cranial nerves 2-12 are intact. There is no pronator drift.  Motor exam shows  4/5 strength in all extremities-effort dependent.  Generalized weakness due to pain all over.  Tone is symmetric bilaterally in upper and lower extremities.  Previously reflexes are symmetric and 1+ in upper extremities and lower extremities. Sensory exam is grossly intact  to light touch, pin prick and vibration.  Finger to nose and heel to shin is without dysmetria.  Romberg is negative.  Gait is normal and the patient is able to do tandem walk and walk on toes and heels with mild difficulty.  Orthostatic vitals    Exam shows some worsening left hip flexion weakness related to pain.  Possibly this is more knee pain than neurological pain.  No change in exam.  Continues to have weakness in the legs.    DIAGNOSTICS  MRI 2022  IMPRESSION:  1.  No acute intracranial abnormalities identified.  2.  Minor chronic small vessel ischemic change, otherwise unremarkable contrast-enhanced MRI of the brain.    MRA                                                               IMPRESSION:  1.  Normal MRA [place].      MRA neck  IMPRESSION:  1.  Normal neck MRA.    Cardiac event monitor    ECHo  Left ventricular size, wall motion and function are normal. The ejection  fraction is 55-60%.  There is borderline anterior, septal, and apical wall hypokinesis.  Normal right ventricle size and systolic function.  No hemodynamically significant valvular abnormalities on 2D or color flow  Imaging.      CTA 2021  HEAD CT:  1.  No evidence of acute hemorrhage, mass effect, or acute vascular territorial infarction.  2.  Severe left sphenoid sinus disease.     HEAD CTA:   1.  No evidence of proximal large vessel occlusion or flow-limiting stenosis.  2.  Dorsally directed 2 mm contour irregularity arising from the distal left supraclinoid ICA may represent a tiny posterior communicating artery aneurysm.     NECK CTA:  1.  No hemodynamically significant stenosis based on NASCET criteria.  2.  Mild left V1 segment stenosis.  3.  No evidence for dissection.      MRI 2020  IMPRESSION:  MRI BRAIN:  1. No finding for acute infarct, hemorrhage, or mass.  2. There are a few very small foci of FLAIR hyperintensity within the cerebral white matter, nonspecific but compatible with small areas of gliosis and/or  chronic myelin loss.     MRA [place]:  1. Congenital variation of the [place] without vascular cutoff, aneurysm, or flow-limiting stenosis.      MRI 2021  HEAD MRI:   1.  Tiny linear focus of diffusion restriction within the right dorsal diomedes suspicious for acute small vessel infarct. This is new compared to 12/07/2020.  2.  No hemorrhagic transformation or mass effect. No additional infarct identified.  3.  Mild presumed microvascular ischemic change within the cerebral white matter.     HEAD MRA:   1.  No aneurysm, high flow AVM or significant stenosis identified.  2.  Variant [place] anatomy as above.     NECK MRA:  1.  Evaluation degraded by suboptimal timing of the contrast bolus and significant venous contamination the gadolinium bolus MRA.  2.  No hemodynamically significant stenosis in the carotid circulation by NASCET criteria.  3.  Poor visualization of the left vertebral artery origin and proximal left V1 segment. This may be artifactual, but it is difficult to exclude high-grade stenosis in this region.      MRI 2021  IMPRESSION:  1.  No mass, hemorrhage, or recent infarct identified.  2.  Diffusion abnormality involving the right ventral diomedes seen on 01/13/2021 is no longer identified. No definite residual signal abnormality in this location to confirm prior infarct. This may relate to small size and slice selection.  3.  Inflammatory changes of the paranasal sinuses including bilateral maxillary air-fluid levels. Correlate with clinical evidence for sinusitis.      RELEVANT LABS  Component      Latest Ref Rng & Units 11/30/2022 2/5/2023   WBC      4.0 - 11.0 10e3/uL  9.3   RBC Count      3.80 - 5.20 10e6/uL  4.59   Hemoglobin      11.7 - 15.7 g/dL  11.8   Hematocrit      35.0 - 47.0 %  38.1   MCV      78 - 100 fL  83   MCH      26.5 - 33.0 pg  25.7 (L)   MCHC      31.5 - 36.5 g/dL  31.0 (L)   RDW      10.0 - 15.0 %  15.1 (H)   Platelet Count      150 - 450 10e3/uL  228   %  Neutrophils      %  69   % Lymphocytes      %  14   % Monocytes      %  6   % Eosinophils      %  11   % Basophils      %  0   % Immature Granulocytes      %  0   NRBCs per 100 WBC      <1 /100  0   Absolute Neutrophils      1.6 - 8.3 10e3/uL  6.4   Absolute Lymphocytes      0.8 - 5.3 10e3/uL  1.3   Absolute Monocytes      0.0 - 1.3 10e3/uL  0.6   Absolute Eosinophils      0.0 - 0.7 10e3/uL  1.0 (H)   Absolute Basophils      0.0 - 0.2 10e3/uL  0.0   Absolute Immature Granulocytes      <=0.4 10e3/uL  0.0   Absolute NRBCs      10e3/uL  0.0   Sodium      136 - 145 mmol/L  138   Potassium      3.4 - 5.3 mmol/L  4.2   Chloride      98 - 107 mmol/L  102   Carbon Dioxide (CO2)      22 - 29 mmol/L  25   Anion Gap      7 - 15 mmol/L  11   Urea Nitrogen      6.0 - 20.0 mg/dL  10.6   Creatinine      0.51 - 0.95 mg/dL  0.58   Calcium      8.6 - 10.0 mg/dL  9.5   Glucose      70 - 99 mg/dL  156 (H)   GFR Estimate      >60 mL/min/1.73m2  >90   Hemoglobin A1C      0.0 - 5.6 % 8.6 (H)      OUTSIDE RECORDS  Outside referral notes and chart notes were reviewed and pertinent information has been summarized (in addition to the HPI):- Dr. Multani notes reviewed      ER notes above reviewed    Component      Latest Ref Rng 9/14/2023  9:21 AM 10/1/2023  9:42 AM   Sodium      135 - 145 mmol/L  136    Potassium      3.4 - 5.3 mmol/L  4.3    Chloride      98 - 107 mmol/L  102    Carbon Dioxide (CO2)      22 - 29 mmol/L  22    Anion Gap      7 - 15 mmol/L  12    Urea Nitrogen      6.0 - 20.0 mg/dL  9.2    Creatinine      0.51 - 0.95 mg/dL  0.53    GFR Estimate      >60 mL/min/1.73m2  >90    Calcium      8.6 - 10.0 mg/dL  9.1    Glucose      70 - 99 mg/dL  201 (H)    WBC      4.0 - 11.0 10e3/uL  7.2    RBC Count      3.80 - 5.20 10e6/uL  4.73    Hemoglobin      11.7 - 15.7 g/dL  11.6 (L)    Hematocrit      35.0 - 47.0 %  38.4    MCV      78 - 100 fL  81    MCH      26.5 - 33.0 pg  24.5 (L)    MCHC      31.5 - 36.5 g/dL  30.2 (L)    RDW       10.0 - 15.0 %  15.7 (H)    Platelet Count      150 - 450 10e3/uL  230    CRP Inflammation      <5.00 mg/L 3.20        Legend:  (H) High  (L) Low    LABS  Component      Latest Ref Rng 11/7/2023  10:46 AM   Sodium      135 - 145 mmol/L 138    Potassium      3.4 - 5.3 mmol/L 4.0    Carbon Dioxide (CO2)      22 - 29 mmol/L 25    Anion Gap      7 - 15 mmol/L 13    Urea Nitrogen      6.0 - 20.0 mg/dL 13.3    Creatinine      0.51 - 0.95 mg/dL 0.59    GFR Estimate      >60 mL/min/1.73m2 >90    Calcium      8.6 - 10.0 mg/dL 9.3    Chloride      98 - 107 mmol/L 100    Glucose      70 - 99 mg/dL 184 (H)    Alkaline Phosphatase      35 - 104 U/L 116 (H)    AST      0 - 45 U/L 48 (H)    ALT      0 - 50 U/L 41    Protein Total      6.4 - 8.3 g/dL 7.1    Albumin      3.5 - 5.2 g/dL 4.1    Bilirubin Total      <=1.2 mg/dL 0.3    KATY-1 (Histidyl-tRNA Synthetase) Bere IgG      0 - 40 AU/mL 1    PL-12 (alanyl-tRNA synthetase) Antibody      Negative  Negative    PL-7 (threonyl-tRNA synthetase) Antibody      Negative  Negative    EJ (glycyl - tRNA synthetase) Antibody      Negative  Negative    OJ (isoleucyl-tRNA synthetase) Antibody      Negative  Negative    SRP (Signal Recognition Particle) Antibody      Negative  Negative    Mi-2 (nuclear helicase protein) Antibody      Negative  Negative    P155/140 (TIF1-gamma) Antibody      Negative  Negative    TIF-1 Gamma Antibody      Negative  Negative    SAE1 (SUMO activating enzyme) Bere      Negative  Negative    MDA5 (CADM 140) Bere      Negative  Negative    NXP-2 (Nuclear matrix protein 2) Bere      Negative  Negative    Myositis Interp See Note    Albumin Fraction      3.7 - 5.1 g/dL 3.8    Alpha 1 Fraction      0.2 - 0.4 g/dL 0.3    Alpha 2 Fraction      0.5 - 0.9 g/dL 0.7    Beta Fraction      0.6 - 1.0 g/dL 0.9    Gamma Fraction      0.7 - 1.6 g/dL 1.0    Monoclonal Peak      <=0.0 g/dL 0.0    ELP Interpretation: Essentially normal electrophoretic pattern. No obvious monoclonal proteins  seen. Pathologic significance requires clinical correlation. Marlene Zamudio M.D., Ph.D.    Vitamin B12      232 - 1,245 pg/mL 602    Vitamin B1 Whole Blood Level      70 - 180 nmol/L 111    TSH      0.30 - 4.20 uIU/mL 0.35    Hemoglobin A1C      0.0 - 5.6 % 11.1 (H)    Magnesium      1.7 - 2.3 mg/dL 1.7    CK Total      26 - 192 U/L 59    Ferritin      11 - 328 ng/mL 26    Immunofixation ELP No monoclonal protein seen on immunofixation. Pathologic significance requires clinical correlation. Marlene Zamudio M.D., Ph.D.    Total Protein Serum for ELP      6.4 - 8.3 g/dL 6.7       Legend:  (H) High  ER notes reviewed.    CLINICAL INTERPRETATION:  This is an abnormal nerve conduction and EMG study.  The needle study shows increased spontaneous activity in bilateral L4/L5/S1 muscle groups.  This can be seen in patients with lumbar radiculopathies.  Further clinical correlation is needed.     MRI L spine 2021-images reviewed.  IMPRESSION:   1.  Small left paracentral L5-S1 disc protrusion. Mild left L5-S1 foraminal stenosis.     2.  Minor degenerative disc changes at L4-L5.     MRI L spine-images reviewed  IMPRESSION:  1.  Moderate lumbar spondylosis at L4-L5 and L5-S1.  2.  No high-grade lumbar spinal canal narrowing.  3.  At L5-S1, there is mild left neural foraminal stenosis.    MRI C spine-images reviewed  IMPRESSION:  1.  Mild cervical spondylosis without significant cervical spinal canal narrowing.  2.  Mild left neural foraminal stenosis at C4-C5.       IMPRESSION/REPORT/PLAN  Cerebrovascular accident of right pontine structure (H)  Medication overuse headache  Vertigo  Light headed  Intractable chronic migraine without aura and without status migrainosus  Bilateral leg weakness/muscle cramps versus knee pain  Left leg weakness  Low ferritin  Lumbar spinal stenosis  Medication noncompliance    This is a 54 year old female history of right pontine stroke thought to be due to vascular risk factors.  Currently she is on  aspirin for stroke prevention.  Further management of vascular risk factors per primary care.  No new stroke symptoms.  Stable.  Recommend staying active.    She does complain of vertigo and most likely this is related to her stroke.  Meclizine is currently helping and should continue.  She is tried vestibular rehab without any benefit.  Could be related to headaches also.  There might not be any further treatment options available for this.  Still present.  Refill meclizine today.    In terms of her headaches these are most suggestive of medication overuse headaches since she is overusing Tylenol.  Encourage her not to use the Tylenol more than 2-3 times per week; reemphasized this today.  With cutting down on the Tylenol there has been some improvement.  Remaining headaches would be classified as migrainous headaches.    Amitriptyline at higher doses is also helping.  Topamax was not effective/caused side effects.  Emgality is now also helping.  Continue Tylenol for abortive therapy.  Headaches worse due to not taking Emgality.  Medications refilled.    She does complain of some lightheadedness and orthostatic vitals were negative.  Most likely this is unrelated to the stroke.  Further management per primary care.  Encourage good hydration.  No change.  She is drinking plenty of water.  Monitor.    She does complain of new bilateral leg weakness/muscle cramps.  On exam she has generalized weakness related to her muscle pain.  Increased left leg weakness on exam today though this might be more knee pain than muscle pain.  EMG suggested a lumbar radiculopathy.  MRI L-spine shows moderate spinal stenosis which could be affecting the leg weakness/cramps.  Tizanidine is helping with the leg cramps and will continue.  She is already doing physical therapy.  MRI C-spine was noncontributory.  Her ferritin was low and iron replacement is also helping. Blood work/EMG was negative for myopathy.  Refilled medications.   Unclear if she is taking it or not though overall symptoms are stable.    For the left knee pain previously she was referred to orthopedics.    Return back in 6 months.      -     amitriptyline (ELAVIL) 50 MG tablet; Take 1 tablet (50 mg) by mouth At Bedtime  -     galcanezumab-gnlm (EMGALITY) 120 MG/ML injection; Inject 1 mL (120 mg) Subcutaneous every 28 days  -     tiZANidine (ZANAFLEX) 2 MG tablet; Take 1 tablet (2 mg) by mouth 3 times daily as needed for muscle spasms  -     ferrous sulfate (FEROSUL) 325 (65 Fe) MG tablet; Take 1 tablet (325 mg) by mouth daily (with breakfast)  - Continue physical therapy for lumbar spinal stenosis    Return in about 6 months (around 4/21/2025) for In-Clinic Visit (must).    Over 30 minutes were spent coordinating the care for the patient on the day of the encounter.  This includes previsit, during visit and post visit activities as documented above.  Counseling patient.  Prescription management.  Multiple problems reviewed.  (Activities include but not inclusive of reviewing chart, reviewing outside records, reviewing labs and imaging study results as well as the images, patient visit time including getting history and exam,  use if applicable, review of test results with the patient and coming up with a plan in a shared model, counseling patient and family, education and answering patient questions, EMR , EMR diagnosis entry and problem list management, medication reconciliation and prescription management if applicable, paperwork if applicable, printing documents and documentation of the visit activities.)        Guerrero Prescott MD  Neurologist  [facility]  Tel:- [phone]    This note was dictated using voice recognition software.  Any grammatical or context distortions are unintentional and inherent to the software.      Again, thank you for allowing me to participate in the care of your patient.         Sincerely,        Guerrero Prescott MD

## 2024-10-22 RX ORDER — AMITRIPTYLINE HYDROCHLORIDE 50 MG/1
50 TABLET ORAL AT BEDTIME
Qty: 90 TABLET | Refills: 1 | Status: SHIPPED | OUTPATIENT
Start: 2024-10-22

## 2024-11-04 NOTE — PROGRESS NOTES
Pulmonary Clinic Follow-Up          Assessment/Plan:     Kulwant Amin is a 54 year old female with a past medical history significant for anxiety, arthritis, questionable asthma versus COPD, diabetes, hypertension and a prior history of SVT - presents for hospital follow-up.    Severe persistent asthma  History of organic fuel exposure in the home.  Previously had nondiagnostic PFTs in 2017.  She has been treated for eosinophilic asthma for many years with a combination of monoclonal antibodies, inhaled bronchodilators and multiple courses of steroids.   Hospitalized 9/26 - 10/1 at [facility] for asthma exacerbation.  Treated with solumedrol, duonebs, doxycycline.  Chest CT without consolidation, did note retained mucous.  She is almost back to baseline.  Diffuse wheezes on exam.     Extended prednisone taper.  Continue Breo Ellipta 1 puff daily.  She knows to gargle after using this.   Continue Spiriva 2 puffs daily.  Continue Duonebs 4 times daily.   Continue Singulair 10mg daily.  As needed albuterol rescue.  Sleep with HOB elevated to more than 30 degrees. Needed due to her obstructive lung disease which is triggered when she is lower than this.    Questionable eosinophilic esophagitis: Has significant epigastric abdominal pain likely secondary to gastritis from chronic steroid use vs eosinophilic esophagitis.  Follows with GI for this  Plans for EGD on 11/21/24.  Omeprazole 40 mg a day.  As needed Tums.  Magic mouthwash prn.    Follow-up: in December with Dr. Duong as planned.     Ellen Harris, CNP  Pulmonary Medicine  [facility]  [phone]       CC:     Hospital follow-up     HPI:     Kulwant Amin is a 54 year old female with a past medical history significant for anxiety, arthritis, questionable asthma versus COPD, diabetes, hypertension and a prior history of SVT - presents for hospital follow-up.    Since hospital discharge, improving but not quite back to  baseline.  Continues to have increased wheezing.   Prednisone did help after hospitalization.           3/8/2024     9:10 AM 3/28/2024    10:35 AM 4/25/2024    11:00 AM   ACT Total Scores   ACT TOTAL SCORE (Goal Greater than or Equal to 20) 8 6 6   In the past 12 months, how many times did you visit the emergency room for your asthma without being admitted to the hospital? 0  1 1   In the past 12 months, how many times were you hospitalized overnight because of your asthma? 0  1 1       Patient-reported      ROS:     6-point ROS performed and is negative aside from those listed in HPI.     Medical history:         Current Outpatient Medications   Medication Sig Dispense Refill    acetaminophen (TYLENOL) 500 MG tablet TAKE 1 PILL BY MOUTH EVERY 6 HOURS AS NEEDED FOR PAIN 100 tablet 10    acetaminophen-codeine (TYLENOL #3) 300-30 MG per tablet Take 1 tablet by mouth every 6 hours as needed for severe pain 30 tablet 0    albuterol (PROAIR HFA/PROVENTIL HFA/VENTOLIN HFA) 108 (90 Base) MCG/ACT inhaler Inhale 2 puffs into the lungs every 4 hours as needed for shortness of breath 18 g 11    albuterol (PROVENTIL) (2.5 MG/3ML) 0.083% neb solution Take 1 vial (2.5 mg) by nebulization every 6 hours as needed for shortness of breath, wheezing or cough 360 mL 11    amitriptyline (ELAVIL) 50 MG tablet TAKE 1 TABLET (50 MG) BY MOUTH AT BEDTIME 90 tablet 1    aspirin 81 MG EC tablet Take 81 mg by mouth daily.      atorvastatin (LIPITOR) 80 MG tablet Take 1 tablet (80 mg) by mouth daily 90 tablet 3    calcium carbonate (TUMS) 500 MG chewable tablet Take 1 chew tab by mouth 2 times daily as needed for heartburn.      colchicine (COLCRYS) 0.6 MG tablet Take 1 tablet (0.6 mg) by mouth daily 90 tablet 3    Continuous Glucose Sensor (FREESTYLE MONICA 2 SENSOR) [facility] 1 EACH EVERY 14 DAYS USE 1 SENSOR EVERY 14 DAYS. USE TO READ BLOOD SUGARS PER 'S INSTRUCTIONS. 2 each 11    diclofenac (VOLTAREN) 1 % topical gel Apply 4 g  topically 4 times daily 350 g 3    ferrous sulfate (FEROSUL) 325 (65 Fe) MG tablet Take 1 tablet (325 mg) by mouth daily (with breakfast). 90 tablet 4    fluticasone-vilanterol (BREO ELLIPTA) 200-25 MCG/ACT inhaler Inhale 1 puff into the lungs daily 60 each 11    gabapentin (NEURONTIN) 600 MG tablet TAKE 1 PILL (600 MG) BY MOUTH 3 TIMES DAILY 90 tablet 3    galcanezumab-gnlm (EMGALITY) 120 MG/ML injection Inject 2 mLs (240 mg) subcutaneously every 28 days. Loading dose. 2 mL 0    galcanezumab-gnlm (EMGALITY) 120 MG/ML injection Inject 1 mL (120 mg) subcutaneously every 28 days. 1 mL 11    guaiFENesin-codeine (ROBITUSSIN AC) 100-10 MG/5ML solution Take 5 mLs by mouth every 4 hours as needed for cough. 118 mL 0    hydrochlorothiazide (MICROZIDE) 12.5 MG capsule Take 1 capsule (12.5 mg) by mouth every morning 90 capsule 3    insulin aspart (NOVOLOG PEN) 100 UNIT/ML pen Inject 16 Units subcutaneously 2 times daily (before meals). 45 mL 3    insulin glargine (LANTUS PEN) 100 UNIT/ML pen Inject 40 Units subcutaneously every morning. 45 mL 3    insulin pen needle (32G X 4 MM) 32G X 4 MM miscellaneous Use 3 pen needles daily or as directed. 300 each 4    ipratropium - albuterol 0.5 mg/2.5 mg/3 mL (DUONEB) 0.5-2.5 (3) MG/3ML neb solution Take 1 vial (3 mLs) by nebulization every 6 hours as needed for shortness of breath, wheezing or cough. 180 mL 3    Lidocaine (LIDOCARE) 4 % Patch Place 1 patch onto the skin every 24 hours To prevent lidocaine toxicity, patient should be patch free for 12 hrs daily. 30 patch 1    meclizine (ANTIVERT) 25 MG tablet Take 1 tablet (25 mg) by mouth 3 times daily as needed for dizziness. 90 tablet 1    metFORMIN (GLUCOPHAGE XR) 500 MG 24 hr tablet Take 2 tablets (1,000 mg) by mouth 2 times daily (with meals) 360 tablet 3    metoprolol tartrate (LOPRESSOR) 25 MG tablet TAKE 1 TABLET (25 MG TOTAL) BY MOUTH 2 (TWO) TIMES A DAY FOR BLOOD PRESSURE 180 tablet 3    mometasone-formoterol (DULERA) 200-5  MCG/ACT inhaler Inhale 2 puffs into the lungs.      montelukast (SINGULAIR) 10 MG tablet Take 1 tablet (10 mg) by mouth at bedtime 30 tablet 11    naproxen (NAPROSYN) 500 MG tablet Take 500 mg by mouth.      NEXIUM 40 MG DR capsule Take 40 mg by mouth every morning (before breakfast).      polyethylene glycol (MIRALAX) 17 GM/Dose powder Take 17 g (1 Capful) by mouth daily 238 g 1    sennosides (SENOKOT) 8.6 MG tablet Take 1 tablet by mouth daily as needed for constipation. 30 tablet 3    SPIRIVA RESPIMAT 2.5 MCG/ACT inhaler INHALE 2 PUFFS INTO THE LUNGS DAILY 4 g 0    sucralfate (CARAFATE) 1 GM/10ML suspension Take 10 mLs (1 g) by mouth 4 times daily. 3600 mL 1    tiZANidine (ZANAFLEX) 2 MG tablet Take 1 tablet (2 mg) by mouth 3 times daily as needed for muscle spasms. 270 tablet 1    vitamin D3 (CHOLECALCIFEROL) 10 MCG (400 UNIT) capsule Take 1 capsule (400 Units) by mouth daily. 90 capsule 3    Vitamin D3 (VITAMIN D) 10 MCG (400 UNIT) tablet Take by mouth daily.       Social history:  Lives in a house built in 1963; no concern for mold in the house.  7 People live in the house including 2 children.  There are no pets in the house.  She is a never smoker and there is no second hand exposure to smoke.  She does not drink alcoholic beverages and denies indulging in recreational drugs.     Physical Exam:     /66 (BP Location: Left arm, Patient Position: Sitting, Cuff Size: Adult Regular)   Pulse 95   Wt 56.2 kg (123 lb 12.8 oz)   SpO2 98%   BMI 24.18 kg/m    Gen: adult female, appears in NAD  HEENT: clear conjunctivae, moist mucous membranes  CV: RRR, no M/G/R  Resp: expiratory wheezes throughout.  Respirations even and unlabored.  On RA.   Skin: no apparent rashes on visible skin  Ext: no cyanosis, clubbing or edema  Neuro: alert and answering questions appropriately       Data:          Latest Reference Range & Units 02/07/22 15:47   Neutrophil Cytoplasmic Antibody <1:10  <1:10   Neutrophil Cytoplasmic  Antibody Pattern  The ANCA IFA is <1:10.  No further testing will be performed.      Latest Reference Range & Units 02/07/22 15:47   Schistosoma Ab IgG Negative  Negative [1]   Strongyloides Bere IgG <=0.9 IV 0.4 [2]      Latest Reference Range & Units 02/07/22 15:47   Immunoglobulin E <=214 kU/L 74 [1]   Allergen Fungimold A Fumigatus IgE <=0.34 kU/L <0.10 [2]   Aspergillus Fumagatis 1 Antibody None Detected  None Detected [3]   Aspergillus Fumagatis 6 Antibody None Detected  None Detected [4]   Allergen, Interp, Immunocap Score IgE  See Note [5]     XR CHEST 2 VIEWS, DATE: 9/13/2024  INDICATION: Cough, sob, wheezing  COMPARISON: AP and lateral views of the chest 7/9/2024                                                                 IMPRESSION:   Left coronary stents. Cardiac silhouette is normal in size. There are atheromatous calcifications in the proximal great vessels. No findings to suggest aortic enlargement. Symmetric lung inflation. Benign calcified granuloma in the right midlung laterally. The lower lobes are slightly underinflated. No interstitial or alveolar opacities. Diaphragm curvature is normal. No pleural effusion.    XR CHEST 2 VIEWS, DATE/TIME: 3/27/2023:  INDICATION: chest pain  COMPARISON: 02/05/2023                                                              IMPRESSION: No pneumothorax or pleural effusion. No focal consolidation. Cardiac silhouette within normal limits. Mildly atherosclerotic aorta.    CT CHEST W/O CONTRAST, DATE/TIME: 7/11/2022   1.  No acute abnormality. No cause of chest pain is evident.  2.  A few small lung nodules without significant change.  Recommendations for one or multiple incidental lung nodules < 6mm :   Low risk patients: No routine follow-up.   High risk patients: Optional follow-up CT at 12 months; if unchanged, no further follow-up.    ECHO 5/4/2022  Interpretation Summary  Left ventricular size, wall motion and function are normal. The ejection  fraction is  55-60%.  There is borderline anterior, septal, and apical wall hypokinesis.  Normal right ventricle size and systolic function.  No hemodynamically significant valvular abnormalities on 2D or color flow imaging.

## 2024-11-04 NOTE — H&P (VIEW-ONLY)
Pulmonary Clinic Follow-Up          Assessment/Plan:     Kulwant Amin is a 54 year old female with a past medical history significant for anxiety, arthritis, questionable asthma versus COPD, diabetes, hypertension and a prior history of SVT - presents for hospital follow-up.    Severe persistent asthma  History of organic fuel exposure in the home.  Previously had nondiagnostic PFTs in 2017.  She has been treated for eosinophilic asthma for many years with a combination of monoclonal antibodies, inhaled bronchodilators and multiple courses of steroids.   Hospitalized 9/26 - 10/1 at [facility] for asthma exacerbation.  Treated with solumedrol, duonebs, doxycycline.  Chest CT without consolidation, did note retained mucous.  She is almost back to baseline.  Diffuse wheezes on exam.     Extended prednisone taper.  Continue Breo Ellipta 1 puff daily.  She knows to gargle after using this.   Continue Spiriva 2 puffs daily.  Continue Duonebs 4 times daily.   Continue Singulair 10mg daily.  As needed albuterol rescue.  Sleep with HOB elevated to more than 30 degrees. Needed due to her obstructive lung disease which is triggered when she is lower than this.    Questionable eosinophilic esophagitis: Has significant epigastric abdominal pain likely secondary to gastritis from chronic steroid use vs eosinophilic esophagitis.  Follows with GI for this  Plans for EGD on 11/21/24.  Omeprazole 40 mg a day.  As needed Tums.  Magic mouthwash prn.    Follow-up: in December with Dr. Duong as planned.     Ellen Harris, CNP  Pulmonary Medicine  [facility]  [phone]       CC:     Hospital follow-up     HPI:     Kulwant Amin is a 54 year old female with a past medical history significant for anxiety, arthritis, questionable asthma versus COPD, diabetes, hypertension and a prior history of SVT - presents for hospital follow-up.    Since hospital discharge, improving but not quite back to  baseline.  Continues to have increased wheezing.   Prednisone did help after hospitalization.           3/8/2024     9:10 AM 3/28/2024    10:35 AM 4/25/2024    11:00 AM   ACT Total Scores   ACT TOTAL SCORE (Goal Greater than or Equal to 20) 8 6 6   In the past 12 months, how many times did you visit the emergency room for your asthma without being admitted to the hospital? 0  1 1   In the past 12 months, how many times were you hospitalized overnight because of your asthma? 0  1 1       Patient-reported      ROS:     6-point ROS performed and is negative aside from those listed in HPI.     Medical history:         Current Outpatient Medications   Medication Sig Dispense Refill    acetaminophen (TYLENOL) 500 MG tablet TAKE 1 PILL BY MOUTH EVERY 6 HOURS AS NEEDED FOR PAIN 100 tablet 10    acetaminophen-codeine (TYLENOL #3) 300-30 MG per tablet Take 1 tablet by mouth every 6 hours as needed for severe pain 30 tablet 0    albuterol (PROAIR HFA/PROVENTIL HFA/VENTOLIN HFA) 108 (90 Base) MCG/ACT inhaler Inhale 2 puffs into the lungs every 4 hours as needed for shortness of breath 18 g 11    albuterol (PROVENTIL) (2.5 MG/3ML) 0.083% neb solution Take 1 vial (2.5 mg) by nebulization every 6 hours as needed for shortness of breath, wheezing or cough 360 mL 11    amitriptyline (ELAVIL) 50 MG tablet TAKE 1 TABLET (50 MG) BY MOUTH AT BEDTIME 90 tablet 1    aspirin 81 MG EC tablet Take 81 mg by mouth daily.      atorvastatin (LIPITOR) 80 MG tablet Take 1 tablet (80 mg) by mouth daily 90 tablet 3    calcium carbonate (TUMS) 500 MG chewable tablet Take 1 chew tab by mouth 2 times daily as needed for heartburn.      colchicine (COLCRYS) 0.6 MG tablet Take 1 tablet (0.6 mg) by mouth daily 90 tablet 3    Continuous Glucose Sensor (FREESTYLE MONICA 2 SENSOR) [facility] 1 EACH EVERY 14 DAYS USE 1 SENSOR EVERY 14 DAYS. USE TO READ BLOOD SUGARS PER 'S INSTRUCTIONS. 2 each 11    diclofenac (VOLTAREN) 1 % topical gel Apply 4 g  topically 4 times daily 350 g 3    ferrous sulfate (FEROSUL) 325 (65 Fe) MG tablet Take 1 tablet (325 mg) by mouth daily (with breakfast). 90 tablet 4    fluticasone-vilanterol (BREO ELLIPTA) 200-25 MCG/ACT inhaler Inhale 1 puff into the lungs daily 60 each 11    gabapentin (NEURONTIN) 600 MG tablet TAKE 1 PILL (600 MG) BY MOUTH 3 TIMES DAILY 90 tablet 3    galcanezumab-gnlm (EMGALITY) 120 MG/ML injection Inject 2 mLs (240 mg) subcutaneously every 28 days. Loading dose. 2 mL 0    galcanezumab-gnlm (EMGALITY) 120 MG/ML injection Inject 1 mL (120 mg) subcutaneously every 28 days. 1 mL 11    guaiFENesin-codeine (ROBITUSSIN AC) 100-10 MG/5ML solution Take 5 mLs by mouth every 4 hours as needed for cough. 118 mL 0    hydrochlorothiazide (MICROZIDE) 12.5 MG capsule Take 1 capsule (12.5 mg) by mouth every morning 90 capsule 3    insulin aspart (NOVOLOG PEN) 100 UNIT/ML pen Inject 16 Units subcutaneously 2 times daily (before meals). 45 mL 3    insulin glargine (LANTUS PEN) 100 UNIT/ML pen Inject 40 Units subcutaneously every morning. 45 mL 3    insulin pen needle (32G X 4 MM) 32G X 4 MM miscellaneous Use 3 pen needles daily or as directed. 300 each 4    ipratropium - albuterol 0.5 mg/2.5 mg/3 mL (DUONEB) 0.5-2.5 (3) MG/3ML neb solution Take 1 vial (3 mLs) by nebulization every 6 hours as needed for shortness of breath, wheezing or cough. 180 mL 3    Lidocaine (LIDOCARE) 4 % Patch Place 1 patch onto the skin every 24 hours To prevent lidocaine toxicity, patient should be patch free for 12 hrs daily. 30 patch 1    meclizine (ANTIVERT) 25 MG tablet Take 1 tablet (25 mg) by mouth 3 times daily as needed for dizziness. 90 tablet 1    metFORMIN (GLUCOPHAGE XR) 500 MG 24 hr tablet Take 2 tablets (1,000 mg) by mouth 2 times daily (with meals) 360 tablet 3    metoprolol tartrate (LOPRESSOR) 25 MG tablet TAKE 1 TABLET (25 MG TOTAL) BY MOUTH 2 (TWO) TIMES A DAY FOR BLOOD PRESSURE 180 tablet 3    mometasone-formoterol (DULERA) 200-5  MCG/ACT inhaler Inhale 2 puffs into the lungs.      montelukast (SINGULAIR) 10 MG tablet Take 1 tablet (10 mg) by mouth at bedtime 30 tablet 11    naproxen (NAPROSYN) 500 MG tablet Take 500 mg by mouth.      NEXIUM 40 MG DR capsule Take 40 mg by mouth every morning (before breakfast).      polyethylene glycol (MIRALAX) 17 GM/Dose powder Take 17 g (1 Capful) by mouth daily 238 g 1    sennosides (SENOKOT) 8.6 MG tablet Take 1 tablet by mouth daily as needed for constipation. 30 tablet 3    SPIRIVA RESPIMAT 2.5 MCG/ACT inhaler INHALE 2 PUFFS INTO THE LUNGS DAILY 4 g 0    sucralfate (CARAFATE) 1 GM/10ML suspension Take 10 mLs (1 g) by mouth 4 times daily. 3600 mL 1    tiZANidine (ZANAFLEX) 2 MG tablet Take 1 tablet (2 mg) by mouth 3 times daily as needed for muscle spasms. 270 tablet 1    vitamin D3 (CHOLECALCIFEROL) 10 MCG (400 UNIT) capsule Take 1 capsule (400 Units) by mouth daily. 90 capsule 3    Vitamin D3 (VITAMIN D) 10 MCG (400 UNIT) tablet Take by mouth daily.       Social history:  Lives in a house built in 1963; no concern for mold in the house.  7 People live in the house including 2 children.  There are no pets in the house.  She is a never smoker and there is no second hand exposure to smoke.  She does not drink alcoholic beverages and denies indulging in recreational drugs.     Physical Exam:     /66 (BP Location: Left arm, Patient Position: Sitting, Cuff Size: Adult Regular)   Pulse 95   Wt 56.2 kg (123 lb 12.8 oz)   SpO2 98%   BMI 24.18 kg/m    Gen: adult female, appears in NAD  HEENT: clear conjunctivae, moist mucous membranes  CV: RRR, no M/G/R  Resp: expiratory wheezes throughout.  Respirations even and unlabored.  On RA.   Skin: no apparent rashes on visible skin  Ext: no cyanosis, clubbing or edema  Neuro: alert and answering questions appropriately       Data:          Latest Reference Range & Units 02/07/22 15:47   Neutrophil Cytoplasmic Antibody <1:10  <1:10   Neutrophil Cytoplasmic  Antibody Pattern  The ANCA IFA is <1:10.  No further testing will be performed.      Latest Reference Range & Units 02/07/22 15:47   Schistosoma Ab IgG Negative  Negative [1]   Strongyloides Bere IgG <=0.9 IV 0.4 [2]      Latest Reference Range & Units 02/07/22 15:47   Immunoglobulin E <=214 kU/L 74 [1]   Allergen Fungimold A Fumigatus IgE <=0.34 kU/L <0.10 [2]   Aspergillus Fumagatis 1 Antibody None Detected  None Detected [3]   Aspergillus Fumagatis 6 Antibody None Detected  None Detected [4]   Allergen, Interp, Immunocap Score IgE  See Note [5]     XR CHEST 2 VIEWS, DATE: 9/13/2024  INDICATION: Cough, sob, wheezing  COMPARISON: AP and lateral views of the chest 7/9/2024                                                                 IMPRESSION:   Left coronary stents. Cardiac silhouette is normal in size. There are atheromatous calcifications in the proximal great vessels. No findings to suggest aortic enlargement. Symmetric lung inflation. Benign calcified granuloma in the right midlung laterally. The lower lobes are slightly underinflated. No interstitial or alveolar opacities. Diaphragm curvature is normal. No pleural effusion.    XR CHEST 2 VIEWS, DATE/TIME: 3/27/2023:  INDICATION: chest pain  COMPARISON: 02/05/2023                                                              IMPRESSION: No pneumothorax or pleural effusion. No focal consolidation. Cardiac silhouette within normal limits. Mildly atherosclerotic aorta.    CT CHEST W/O CONTRAST, DATE/TIME: 7/11/2022   1.  No acute abnormality. No cause of chest pain is evident.  2.  A few small lung nodules without significant change.  Recommendations for one or multiple incidental lung nodules < 6mm :   Low risk patients: No routine follow-up.   High risk patients: Optional follow-up CT at 12 months; if unchanged, no further follow-up.    ECHO 5/4/2022  Interpretation Summary  Left ventricular size, wall motion and function are normal. The ejection  fraction is  55-60%.  There is borderline anterior, septal, and apical wall hypokinesis.  Normal right ventricle size and systolic function.  No hemodynamically significant valvular abnormalities on 2D or color flow imaging.

## 2024-11-05 ENCOUNTER — OFFICE VISIT (OUTPATIENT)
Dept: PULMONOLOGY | Facility: CLINIC | Age: 54
End: 2024-11-05
Payer: COMMERCIAL

## 2024-11-05 VITALS
BODY MASS INDEX: 24.18 KG/M2 | OXYGEN SATURATION: 98 % | HEART RATE: 95 BPM | DIASTOLIC BLOOD PRESSURE: 66 MMHG | WEIGHT: 123.8 LBS | SYSTOLIC BLOOD PRESSURE: 114 MMHG

## 2024-11-05 DIAGNOSIS — J45.51 SEVERE PERSISTENT ASTHMA WITH EXACERBATION (H): Primary | ICD-10-CM

## 2024-11-05 PROCEDURE — 99214 OFFICE O/P EST MOD 30 MIN: CPT | Performed by: NURSE PRACTITIONER

## 2024-11-05 RX ORDER — IPRATROPIUM BROMIDE AND ALBUTEROL SULFATE 2.5; .5 MG/3ML; MG/3ML
1 SOLUTION RESPIRATORY (INHALATION) 4 TIMES DAILY
Qty: 360 ML | Refills: 11 | Status: SHIPPED | OUTPATIENT
Start: 2024-11-05

## 2024-11-05 RX ORDER — PREDNISONE 10 MG/1
TABLET ORAL
Qty: 30 TABLET | Refills: 0 | Status: SHIPPED | OUTPATIENT
Start: 2024-11-05 | End: 2024-11-17

## 2024-11-05 ASSESSMENT — ASTHMA QUESTIONNAIRES
QUESTION_5 LAST FOUR WEEKS HOW WOULD YOU RATE YOUR ASTHMA CONTROL: POORLY CONTROLLED
QUESTION_2 LAST FOUR WEEKS HOW OFTEN HAVE YOU HAD SHORTNESS OF BREATH: MORE THAN ONCE A DAY
QUESTION_4 LAST FOUR WEEKS HOW OFTEN HAVE YOU USED YOUR RESCUE INHALER OR NEBULIZER MEDICATION (SUCH AS ALBUTEROL): THREE OR MORE TIMES PER DAY
HOSPITALIZATION_OVERNIGHT_LAST_YEAR_TOTAL: TWO
QUESTION_1 LAST FOUR WEEKS HOW MUCH OF THE TIME DID YOUR ASTHMA KEEP YOU FROM GETTING AS MUCH DONE AT WORK, SCHOOL OR AT HOME: ALL OF THE TIME
ACT_TOTALSCORE: 6
ACT_TOTALSCORE: 6
QUESTION_3 LAST FOUR WEEKS HOW OFTEN DID YOUR ASTHMA SYMPTOMS (WHEEZING, COUGHING, SHORTNESS OF BREATH, CHEST TIGHTNESS OR PAIN) WAKE YOU UP AT NIGHT OR EARLIER THAN USUAL IN THE MORNING: FOUR OR MORE NIGHTS A WEEK
EMERGENCY_ROOM_LAST_YEAR_TOTAL: THREE OR MORE

## 2024-11-05 NOTE — PATIENT INSTRUCTIONS
It was a pleasure to see you in clinic today.   Here is what we discussed:    Start prednisone taper:  40mg x3 days, 30mg x3 days, 20mg x3 days, 10mg x3 days.  Continue Breo Ellipta one puff daily, rinse/gargle after use.   Continue Spiriva two puffs daily.   Continue Singulair 10mg daily.   Continue Duonebs 4 times daily.  Continue Albuterol inhaler every 4-6 hours as needed for shortness of breath or wheezing.  Call my nurse, Saul ([phone]) with any change or worsening of your breathing.  Follow-up with Dr Duong in December.     Ellen Harris, CNP  Pulmonary Medicine  [facility]  [phone]

## 2024-11-12 ENCOUNTER — OFFICE VISIT (OUTPATIENT)
Dept: PHARMACY | Facility: CLINIC | Age: 54
End: 2024-11-12
Payer: COMMERCIAL

## 2024-11-12 VITALS
DIASTOLIC BLOOD PRESSURE: 84 MMHG | BODY MASS INDEX: 24.45 KG/M2 | OXYGEN SATURATION: 97 % | SYSTOLIC BLOOD PRESSURE: 125 MMHG | WEIGHT: 125.2 LBS | HEART RATE: 97 BPM

## 2024-11-12 DIAGNOSIS — R05.1 ACUTE COUGH: ICD-10-CM

## 2024-11-12 DIAGNOSIS — G89.29 CHRONIC NONINTRACTABLE HEADACHE, UNSPECIFIED HEADACHE TYPE: ICD-10-CM

## 2024-11-12 DIAGNOSIS — R42 DIZZINESS: ICD-10-CM

## 2024-11-12 DIAGNOSIS — I31.9 PERICARDITIS, UNSPECIFIED CHRONICITY, UNSPECIFIED TYPE: ICD-10-CM

## 2024-11-12 DIAGNOSIS — K21.9 GASTROESOPHAGEAL REFLUX DISEASE WITHOUT ESOPHAGITIS: ICD-10-CM

## 2024-11-12 DIAGNOSIS — J44.9 CHRONIC OBSTRUCTIVE PULMONARY DISEASE, UNSPECIFIED COPD TYPE (H): ICD-10-CM

## 2024-11-12 DIAGNOSIS — I10 ESSENTIAL HYPERTENSION: ICD-10-CM

## 2024-11-12 DIAGNOSIS — R51.9 CHRONIC NONINTRACTABLE HEADACHE, UNSPECIFIED HEADACHE TYPE: ICD-10-CM

## 2024-11-12 DIAGNOSIS — R52 PAIN: ICD-10-CM

## 2024-11-12 DIAGNOSIS — E11.9 TYPE 2 DIABETES MELLITUS TREATED WITH INSULIN (H): Primary | ICD-10-CM

## 2024-11-12 DIAGNOSIS — K59.00 CONSTIPATION, UNSPECIFIED CONSTIPATION TYPE: ICD-10-CM

## 2024-11-12 DIAGNOSIS — F33.9 RECURRENT MAJOR DEPRESSIVE DISORDER, REMISSION STATUS UNSPECIFIED (H): ICD-10-CM

## 2024-11-12 DIAGNOSIS — E78.5 HYPERLIPIDEMIA, UNSPECIFIED HYPERLIPIDEMIA TYPE: ICD-10-CM

## 2024-11-12 DIAGNOSIS — Z78.9 TAKES DIETARY SUPPLEMENTS: ICD-10-CM

## 2024-11-12 DIAGNOSIS — J45.50 SEVERE PERSISTENT ASTHMA, UNSPECIFIED WHETHER COMPLICATED (H): ICD-10-CM

## 2024-11-12 DIAGNOSIS — Z79.4 TYPE 2 DIABETES MELLITUS TREATED WITH INSULIN (H): Primary | ICD-10-CM

## 2024-11-12 PROCEDURE — 99606 MTMS BY PHARM EST 15 MIN: CPT | Performed by: PHARMACIST

## 2024-11-12 PROCEDURE — 99607 MTMS BY PHARM ADDL 15 MIN: CPT | Performed by: PHARMACIST

## 2024-11-12 RX ORDER — TIOTROPIUM BROMIDE INHALATION SPRAY 3.12 UG/1
2 SPRAY, METERED RESPIRATORY (INHALATION) DAILY
Qty: 4 G | Refills: 3 | Status: SHIPPED | OUTPATIENT
Start: 2024-11-12

## 2024-11-12 RX ORDER — LISINOPRIL 5 MG/1
5 TABLET ORAL DAILY
Qty: 30 TABLET | Refills: 3 | Status: SHIPPED | OUTPATIENT
Start: 2024-11-12

## 2024-11-12 NOTE — PATIENT INSTRUCTIONS
"  Recommendations from today's MTM visit:                                                       Scan Libre2 sensor at least every 8 hours - recommend scanning when you wake up, at every meal, and before bed  STOP hydrochlorothiazide   START lisinopril 5mg daily   Check if fluoxetine at home     Follow-up: 01/15/2025 with PharmMARNI     It was great speaking with you today.  I value your experience and would be very thankful for your time in providing feedback in our clinic survey. In the next few days, you may receive an email or text message from [employer] with a link to a survey related to your  clinical pharmacist.\"      To schedule another MTM appointment, please call the clinic directly or you may call the MTM scheduling line at [phone].      My Clinical Pharmacist's contact information:                                                       Please feel free to contact me with any questions or concerns you have.      Andra Hernández, Maru   Medication Therapy Management Pharmacy Resident      "

## 2024-11-12 NOTE — PROGRESS NOTES
Medication Therapy Management (MTM) Encounter    ASSESSMENT:                            Medication Adherence/Access: See below for considerations.    1. Type 2 diabetes mellitus treated with insulin (H)  A1c not at goal <7%, though blood sugars per CGM are meeting goal >70% in target. No medications changed today due to limited blood sugar data. In the future, could consider starting a GLP-1 and titrating down insulin. Counseled patient to scan blood sugar sensor several times a day.      2. Essential hypertension  BP not at goal <130/80 mmHg. Patient experiencing dizziness daily which hydrochlorothiazide could be contributing to. Patient would benefit from discontinuing hydrochlorothiazide to see if that improves symptoms and starting an ACE inhibitor for blood pressure control/elevated urine albumin.     3. Hyperlipidemia, unspecified hyperlipidemia type  LDL at goal, continue current therapy. Continue medications with no changes.   Managed by cardiology.      4. Severe persistent asthma/allergies  Managed by pulmonologist and allergist. Breathing bas been worse the past few days since the nebulizer stopped working. Patient requested new tubing/mouthpiece for nebulizer as she reported hers no longer lets the medication flow through it. Provided new tubing to patient in clinic. Currently completing prednisone taper which is set to end 11/16. Confirmed that patient is not using dulera inhaler, removed from med list.     5. Constipation, unspecified constipation type  Stable.     6. Gastroesophageal reflux disease without esophagitis  Managed by GI. Annamaria.     7. Chronic nonintractable headache, unspecified headache type  Managed by neurology. Annamaria.     8. Recurrent major depressive disorder, remission status unspecified (H24)  Patient believes she is still taking fluoxetine, but bottle not brought to clinic and not filled since February. Patient will check at home to see if she has fluoxetine and will report to  Za at visit next week.      9. Takes dietary supplements  Stable.     10. Pericarditis, unspecified chronicity, unspecified type  Stable.  Managed by cardiology.     11. Pain in both knees, unspecified chronicity  Managed by neurology. Confirmed that patient is no longer taking tylenol with codiene and naproxen. Removed from med list.     12. Cough  Cough has gotten worse since nebulizer stopped working. Has been using robitussin up to twice daily the past few days. Expected to improve now that patient has new nebulizer tubing and will be able to take all of her inhaled medications.     13. Dizziness  Patient reported dizziness has improved with meclizine, although she still gets dizzy about once per day.     PLAN:                            Scan Libre2 sensor at least every 8 hours - recommend scanning when you wake up, at every meal, and before bed  STOP hydrochlorothiazide   START lisinopril 5mg daily   Check if fluoxetine at home     Follow-up: 01/15/2025 with PharmD    Medication issues to be addressed at a future visit:   Ozempic? - not using jardiance due to dizziness    SUBJECTIVE/OBJECTIVE:                          Kulwant Amin is a 54 year old female seen for a follow-up visit. Patient was accompanied by daughter-in-law.  (ID# [id number]) was used during today's visit.  Discharged from hospital for asthma 10/1     Reason for visit: MTM follow-up.    Allergies/ADRs: None  Past Medical History: Reviewed in chart  Tobacco: She reports that she quit smoking about 21 years ago. Her smoking use included cigarettes. She started smoking about 51 years ago. She has a 30 pack-year smoking history. She has never been exposed to tobacco smoke. She has never used smokeless tobacco.  Alcohol: none    Medication Adherence/Access: Patient has a home health nurse that sets up a three times daily pillbox. Reports 0 missed doses. Medication vials brought into clinic today, daughter in law reported that some medications  were left at home.   -tylenol  -sucralfate  -nebulizers     Diabetes   Metformin  mg 2 tablets twice daily with food  Lantus 40 units once daily at night   Novolog 16 units twice daily 10 minutes before meals   Daughter in law helps patient with insulin administration, no missed doses.   Reports no medication side effects.   Current diabetes symptoms: low: shaky - aware of how to correct for lows if needed.   Diet/Exercise: Reports eating 2 meals per day + snack in middle of day, 11 am and 6:30-7, usually eats brown rice, beans, lentils, veggies. Has decreased portion size and eating better.   Med Hx: metformin IR (nausea/vomiting)      Blood sugar monitoring: Freestyle Gemma CGM. Sensor fell off last 10/23 - new sensor put on 11/6  Experiencing hypoglycemia symptoms (shaky) - happens occasionally, but reported this is happening less than in the past - sees 90-100s when she feels this way        Eye exam is up to date  Foot exam is up to date    Hypertension   /CAD/SVT  Hydrochlorothiazide 12.5 mg daily  Metoprolol tartrate 25 mg twice daily   Patient reports the following medication side effects: dizziness which has been the same as before. Reports feeling dizzy every day.  Patient self-monitors blood pressure: usually 120-125/90-97 mmHg.  Cardiology: Dr. Hirsch  - per last MTM visit, cardiology referring to PCP and MTM to manage blood pressure      Hyperlipidemia   /Hx pontine stroke  Atorvastatin 80 mg daily   Aspirin 81 mg daily (per neurology)   Patient reports muscle pains bilaterally in her legs, but points to R knee. States that this has been present for a long time and her doctors are already aware of this.      Asthma /COPD/asthma/hypereosinophilia:    Prednisone taper - started 11/5, to finish 11/16   Montelukast 10 mg daily at bedtime  Breo Ellipta 200/25mcg 1 puff daily at night  Spiriva 2.5 mcg 2 puffs daily at night - TAKING? LF 9/12  Albuterol HFA every 4 hours daily as needed - using every  day  Albuterol nebs every 6 hours as needed - trying to use 3x/day but not working   Duoneb 0.5-2.5mg/3mL neb 4 times daily   Dulera - never used, removed from med list   Confirmed that patient rinses mouth after using inhalers.   Brought in mouthpiece for nebulizer - reported that medicine does not come out of it, requesting a new mouthpiece. Breathing has been worse since nebulizer stopped working, but reported that when able to take all medications, it was better.   Pulmonologist: [facility], Tamra Mohr.   Allergy & immunology: [facility], Dr. Elizabeth Marie. Per chart, she needs to be seen by Dr. Marie, daughter in law plans to call and schedule visit - [phone]      Constipation   Miralax 17 g daily   Senna 8.6mg daily PRN- using 1 every day   Having a bowel movement 1 time per day.       GERD    Esomeprazole 40 mg daily in the morning  Sucralfate 1g 3 times daily   Tums 1 tablet twice daily PRN - using about once/week   Managed by gastroenterology.   Reports well controlled symptoms with current regimen.  Patient previously taking omeprazole which was recently switched to esomeprazole per GI specialist.     Headache   Amitriptyline 50 mg daily at bedtime  Emgality every 28 days - restarted 10/21  Seeing neurologist, Dr. Prescott.      Mental Health / Depression  Fluoxetine 10 mg daily  Patient reported that she is taking fluoxetine, though there is no fill history since 2/12/24 and it is not currently on medication list. Originally prescribed by Dr. Cardoza. No pill bottle brought into clinic today, will check at home to see if she has fluoxetine. Reports mood has been stable at this time.      Supplements   Vitamin D 400 international units daily  States that she has been taking this for mouth sores and finds it helpful.     Pericarditis    Colchicine 0.6 mg daily (started 7/24)  Reports improvement of chest pain.   Rx per Dr. Hirsch    Pain/Leg cramps  Acetaminophen 500mg Q6h PRN pain -  twice daily as needed   Gabapentin 600 mg three times daily  Ferrous sulfate 325 daily - per juan for leg cramps  Diclofenac 1% gel 4g 4 times daily - and this helps a lot   Acetaminophen-codeine 300/30 mg every 6 hours as needed for severe pain - using max once daily and only as needed - no longer taking, removed from med list   Lidocaine 4% patch daily   Naproxen 500mg daily - no longer taking, removed from med list   Tizanidine 2mg 3 times daily as needed   Thinks that her pain right now is tolerable.     Cough  Guaifenesin-codeine 100-10 mg/5ml soln - 5 mls every 4 hours PRN - still using, as needed for cough - using 2x/day - using more since neb stopped working     Dizziness  Meclizine 25mg 3 times daily PRN  Rx from Dr. Prescott (neurology)   Reported that it has helped a little bit, but still experiences dizziness daily.      Today's Vitals: /84   Pulse 97   Wt 125 lb 3.2 oz (56.8 kg)   SpO2 97%   BMI 24.45 kg/m    ----------------      I spent 60 minutes with this patient today. All changes were made via collaborative practice agreement with Adrien Cardoza MD. A copy of the visit note was provided to the patient's provider(s).    A summary of these recommendations was given to the patient.    Andra Hernández, PharmD   Medication Therapy Management Pharmacy Resident    Malu Gil, PharmD, [facility]  Medication Therapy Management Pharmacist     Medication Therapy Recommendations  Essential hypertension   1 Current Medication: hydrochlorothiazide (MICROZIDE) 12.5 MG capsule (Discontinued)   Current Medication Sig: Take 1 capsule (12.5 mg) by mouth every morning   Rationale: More effective medication available - Ineffective medication - Effectiveness   Recommendation: Discontinue Medication - lisinopril 5 MG tablet   Status: Accepted per CPA   Identified Date: 11/12/2024 Completed Date: 11/12/2024

## 2024-11-12 NOTE — Clinical Note
Idalia Cardoza,   I saw Kulwant Amin today in clinic. I stopped her hydrochlorothiazide and switched her to lisinopril as she has been having dizziness that has persisted. Also, she believes she is still taking fluoxetine, but it has not been filled since 02/12 and is not on her med list. No bottle was brought into clinic today, so I told her to check at home and report back to you at your appointment next week if she is taking it. Let me know if you have any questions!   Thanks,  Tommy

## 2024-11-18 ENCOUNTER — OFFICE VISIT (OUTPATIENT)
Dept: FAMILY MEDICINE | Facility: CLINIC | Age: 54
End: 2024-11-18
Payer: COMMERCIAL

## 2024-11-18 VITALS
HEART RATE: 104 BPM | OXYGEN SATURATION: 96 % | BODY MASS INDEX: 23.63 KG/M2 | DIASTOLIC BLOOD PRESSURE: 74 MMHG | RESPIRATION RATE: 20 BRPM | WEIGHT: 120.38 LBS | TEMPERATURE: 98.2 F | HEIGHT: 60 IN | SYSTOLIC BLOOD PRESSURE: 126 MMHG

## 2024-11-18 DIAGNOSIS — Z98.890 H/O ESOPHAGOGASTRODUODENOSCOPY: ICD-10-CM

## 2024-11-18 DIAGNOSIS — Z79.4 TYPE 2 DIABETES MELLITUS TREATED WITH INSULIN (H): ICD-10-CM

## 2024-11-18 DIAGNOSIS — K21.9 GASTROESOPHAGEAL REFLUX DISEASE WITHOUT ESOPHAGITIS: Chronic | ICD-10-CM

## 2024-11-18 DIAGNOSIS — Z01.818 PREOPERATIVE EXAMINATION: Primary | ICD-10-CM

## 2024-11-18 DIAGNOSIS — E11.9 TYPE 2 DIABETES MELLITUS TREATED WITH INSULIN (H): ICD-10-CM

## 2024-11-18 DIAGNOSIS — J45.998 POORLY CONTROLLED PERSISTENT ASTHMA: ICD-10-CM

## 2024-11-18 DIAGNOSIS — F33.9 RECURRENT MAJOR DEPRESSIVE DISORDER, REMISSION STATUS UNSPECIFIED (H): ICD-10-CM

## 2024-11-18 PROCEDURE — G2211 COMPLEX E/M VISIT ADD ON: HCPCS | Performed by: FAMILY MEDICINE

## 2024-11-18 PROCEDURE — 99214 OFFICE O/P EST MOD 30 MIN: CPT | Performed by: FAMILY MEDICINE

## 2024-11-18 ASSESSMENT — PATIENT HEALTH QUESTIONNAIRE - PHQ9
SUM OF ALL RESPONSES TO PHQ QUESTIONS 1-9: 5
SUM OF ALL RESPONSES TO PHQ QUESTIONS 1-9: 5
10. IF YOU CHECKED OFF ANY PROBLEMS, HOW DIFFICULT HAVE THESE PROBLEMS MADE IT FOR YOU TO DO YOUR WORK, TAKE CARE OF THINGS AT HOME, OR GET ALONG WITH OTHER PEOPLE: SOMEWHAT DIFFICULT

## 2024-11-18 NOTE — PROGRESS NOTES
Preoperative Evaluation  [address]  [place] [address]  Phone: [phone]  Fax: [phone]  Primary Provider: Adrien Cardoza MD  Pre-op Performing Provider: Adrien Cardoza MD  Nov 18, 2024 11/18/2024   Surgical Information   What procedure is being done? ESOPHAGOGASTRODUODENOSCOPY    Facility or Hospital where procedure/surgery will be performed: [facility]    Who is doing the procedure / surgery? Gomez Bella MD    Date of surgery / procedure: November 21, 2024    Time of surgery / procedure: TBD    Where do you plan to recover after surgery? at home with family        Patient-reported     Fax number for surgical facility: Note does not need to be faxed, will be available electronically in Epic.    Assessment & Plan     Preoperative examination    Gastroesophageal reflux disease without esophagitis  Procedure planned    H/O esophagogastroduodenoscopy  ~ 3 years ago.    Recurrent major depressive disorder, remission status unspecified (H)  Restart Prozac  - FLUoxetine (PROZAC) 20 MG capsule; Take 1 capsule (20 mg) by mouth daily.    Type 2 diabetes mellitus treated with insulin (H)  Continue current medications.  Hold metformin on the day of the procedure.    Poorly controlled persistent asthma   Last saw pulmonology on 11/5/2024, started on prednisone taper dose.  Currently taking prednisone 20 mg daily.  Significant wheezing on exam.  Advised to increase prednisone to 20 mg twice daily until the time of the procedure then back to taper dose.    Hypertension  HCTZ was discontinued and replaced with lisinopril.  Normal blood pressure today.    Antiplatelet or Anticoagulation Medication Instructions   - aspirin: hold starting today    Additional Medication Instructions  Hold Metformin on the day of the procedure    Recommendation  Approval given to proceed with proposed procedure, without further diagnostic evaluation.    Doug Blum is a 54 year  old, presenting for the following:  Pre-Op Exam          11/18/2024     1:37 PM   Additional Questions   Roomed by [place]   Accompanied by Daughter in law : Billy     HPI related to upcoming procedure: History of GERD.  Planned procedure as above.        11/18/2024   Pre-Op Questionnaire   Have you ever had a heart attack or stroke? (!) YES No residual symptoms     Have you ever had surgery on your heart or blood vessels, such as a stent placement, a coronary artery bypass, or surgery on an artery in your head, neck, heart, or legs? (!) YES     Do you have chest pain with activity? (!) No acute chest pain     Do you have a history of heart failure? (!) YES     Do you currently have a cold, bronchitis or symptoms of other infection? (!) UNKNOWN     Do you have a cough, shortness of breath, or wheezing? (!) YES     Do you or anyone in your family have previous history of blood clots? (!) YES     Do you or does anyone in your family have a serious bleeding problem such as prolonged bleeding following surgeries or cuts? No    Have you ever had problems with anemia or been told to take iron pills? (!) YES     Have you had any abnormal blood loss such as black, tarry or bloody stools, or abnormal vaginal bleeding? (!) YES     Have you ever had a blood transfusion? (!) YES    Have you ever had a transfusion reaction? No    Are you willing to have a blood transfusion if it is medically needed before, during, or after your surgery? Yes    Have you or any of your relatives ever had problems with anesthesia? No    Do you have sleep apnea, excessive snoring or daytime drowsiness? (!) YES    Do you have a CPAP machine? (!) NO     Do you have any artifical heart valves or other implanted medical devices like a pacemaker, defibrillator, or continuous glucose monitor? No    Do you have artificial joints? No    Are you allergic to latex? No        Patient-reported     Health Care Directive  Patient does not have a Health Care  "Directive:     Preoperative Review of . Not on opid pain med currently.  [id number]}      Patient Active Problem List    Diagnosis Date Noted    Shortness of breath 09/26/2024     Priority: Medium    Major depression, recurrent (H) 05/23/2024     Priority: Medium    Chronic low back pain without sciatica, unspecified back pain laterality 05/23/2024     Priority: Medium    Type 2 diabetes mellitus treated with insulin (H) 07/14/2022     Priority: Medium    FLACO (obstructive sleep apnea)- mild (AHI 14) 04/07/2022     Priority: Medium     4/1/2022 [facility] Sleep Study (131.2 lbs) - AHI 14.2, RDI 17.9, Supine AHI 13.0, REM AHI 20.7, Low O2 86.0%, Time Spent <=88% 4.0 minutes / Time Spent <=89% 11.5 minutes.      Insomnia 01/26/2022     Priority: Medium    Constipation 12/15/2021     Priority: Medium    Dysphagia 12/15/2021     Priority: Medium    Chronic abdominal pain 12/15/2021     Priority: Medium    H/O arterial ischemic stroke 09/10/2021     Priority: Medium       Right pontine- Jan 2021      Anxiety 02/08/2021     Priority: Medium    Dizziness 02/08/2021     Priority: Medium     Like \"like whole body spinning\", + Nausea      Chronic nonintractable headache, unspecified headache type 06/24/2020     Priority: Medium    Hyperlipidemia 11/07/2018     Priority: Medium    Moderate persistent asthma 03/08/2018     Priority: Medium    Coronary artery disease due to lipid rich plaque 01/25/2018     Priority: Medium     1/2018 she had stent placement to proximal to mid LAD.  Multiple stress test since that time including stress MRI have not shown evidence of inducible ischemia.       GERD (gastroesophageal reflux disease) 12/15/2016     Priority: Medium    Cataracts, bilateral 06/03/2015     Priority: Medium    Corneal opacity 06/03/2015     Priority: Medium     Problem list name updated by automated process. Provider to review      Irregular astigmatism 06/03/2015     Priority: Medium    Essential hypertension " 09/02/2014     Priority: Medium    TB lung, latent 08/17/2013     Priority: Medium     INH for 9 months      COPD (chronic obstructive pulmonary disease)/Asthma  08/17/2013     Priority: Medium     Unclear dx; being f/u by pulm    PFT Latest Ref Rng & Units 1/23/2015   FVC L 2.40   FEV1 L 1.81   FVC% % 81   FEV1% % 75         Pulmonary nodule, right 06/12/2013     Priority: Medium     4 mm in size.      Environmental allergies 06/12/2013     Priority: Medium     Allergic to dust mite and cockroach.       Unspecified visual loss 04/17/2012     Priority: Medium    Dental caries 04/17/2012     Priority: Medium      Past Medical History:   Diagnosis Date    Acute asthma exacerbation 01/06/2020    Anxiety     Arthritis     Asthma exacerbation 11/19/2015    Chronic obstructive pulmonary disease, unspecified COPD type (H)     COPD (chronic obstructive pulmonary disease) (H)     Coronary artery disease due to lipid rich plaque     Depression     Diabetes (H)     Epigastric pain 12/15/2021    Essential hypertension     GERD (gastroesophageal reflux disease)     Infection due to 2019 novel coronavirus 01/03/2022    positive with COVID-19 on January 3, 2022    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    Microcytic anemia 11/17/2019    S/P coronary artery stent placement 02/02/2018    TB lung, latent     9 mos INH     Past Surgical History:   Procedure Laterality Date    CORONARY STENT PLACEMENT  2018    CV CORONARY ANGIOGRAM N/A 1/25/2018    Procedure: Coronary Angiogram;  Surgeon: Angelo Serrano MD;  Location[facility] Cath Lab;  Service:     CV CORONARY ANGIOGRAM N/A 5/5/2022    Procedure: Coronary Angiogram;  Surgeon: Irwin Cano MD;  Location: [facility] CATH LAB CV    CV CORONARY ANGIOGRAM  5/5/2022    Procedure: ;  Surgeon: Irwin Cano MD;  Location: [facility] CATH LAB CV    OR ESOPHAGOGASTRODUODENOSCOPY TRANSORAL DIAGNOSTIC N/A 12/10/2018    Procedure: ESOPHAGOGASTRODUODENOSCOPY (EGD);  Surgeon: Eddie Renteria  MD BRENDA;  Location: [facility];  Service: Gastroenterology    RI ESOPHAGOGASTRODUODENOSCOPY TRANSORAL DIAGNOSTIC N/A 12/3/2020    Procedure: ESOPHAGOGASTRODUODENOSCOPY (EGD) with biospies ;  Surgeon: Avi Crow MD;  Location: [facility];  Service: Gastroenterology    [email or web address] COLONOSCOPY W/WO BRUSH/WASH N/A 12/10/2018    Procedure: COLONOSCOPY with polypectomy using biopsy forceps;  Surgeon: Eddie Renteria MD;  Location: [facility];  Service: Gastroenterology     Current Outpatient Medications   Medication Sig Dispense Refill    albuterol (PROAIR HFA/PROVENTIL HFA/VENTOLIN HFA) 108 (90 Base) MCG/ACT inhaler Inhale 2 puffs into the lungs every 4 hours as needed for shortness of breath 18 g 11    amitriptyline (ELAVIL) 50 MG tablet TAKE 1 TABLET (50 MG) BY MOUTH AT BEDTIME 90 tablet 1    aspirin 81 MG EC tablet Take 81 mg by mouth daily.      atorvastatin (LIPITOR) 80 MG tablet Take 1 tablet (80 mg) by mouth daily 90 tablet 3    colchicine (COLCRYS) 0.6 MG tablet Take 1 tablet (0.6 mg) by mouth daily 90 tablet 3    ferrous sulfate (FEROSUL) 325 (65 Fe) MG tablet Take 1 tablet (325 mg) by mouth daily (with breakfast). 90 tablet 4    fluticasone-vilanterol (BREO ELLIPTA) 200-25 MCG/ACT inhaler Inhale 1 puff into the lungs daily 60 each 11    lisinopril (ZESTRIL) 5 MG tablet Take 1 tablet (5 mg) by mouth daily. 30 tablet 3    metFORMIN (GLUCOPHAGE XR) 500 MG 24 hr tablet Take 2 tablets (1,000 mg) by mouth 2 times daily (with meals) 360 tablet 3    metoprolol tartrate (LOPRESSOR) 25 MG tablet TAKE 1 TABLET (25 MG TOTAL) BY MOUTH 2 (TWO) TIMES A DAY FOR BLOOD PRESSURE 180 tablet 3    montelukast (SINGULAIR) 10 MG tablet Take 1 tablet (10 mg) by mouth at bedtime 30 tablet 11    tiotropium (SPIRIVA RESPIMAT) 2.5 MCG/ACT inhaler INHALE 2 PUFFS INTO THE LUNGS DAILY 4 g 3    vitamin D3 (CHOLECALCIFEROL) 10 MCG (400 UNIT) capsule Take 1 capsule (400 Units) by mouth daily. 90 capsule 3    Vitamin D3 (VITAMIN D) 10 MCG  (400 UNIT) tablet Take by mouth daily.      acetaminophen (TYLENOL) 500 MG tablet TAKE 1 PILL BY MOUTH EVERY 6 HOURS AS NEEDED FOR PAIN 100 tablet 10    albuterol (PROVENTIL) (2.5 MG/3ML) 0.083% neb solution Take 1 vial (2.5 mg) by nebulization every 6 hours as needed for shortness of breath, wheezing or cough 360 mL 11    calcium carbonate (TUMS) 500 MG chewable tablet Take 1 chew tab by mouth 2 times daily as needed for heartburn.      Continuous Glucose Sensor (FREESTYLE MONICA 2 SENSOR) [facility] 1 EACH EVERY 14 DAYS USE 1 SENSOR EVERY 14 DAYS. USE TO READ BLOOD SUGARS PER 'S INSTRUCTIONS. 2 each 11    diclofenac (VOLTAREN) 1 % topical gel Apply 4 g topically 4 times daily 350 g 3    gabapentin (NEURONTIN) 600 MG tablet TAKE 1 PILL (600 MG) BY MOUTH 3 TIMES DAILY 90 tablet 3    galcanezumab-gnlm (EMGALITY) 120 MG/ML injection Inject 2 mLs (240 mg) subcutaneously every 28 days. Loading dose. 2 mL 0    galcanezumab-gnlm (EMGALITY) 120 MG/ML injection Inject 1 mL (120 mg) subcutaneously every 28 days. 1 mL 11    guaiFENesin-codeine (ROBITUSSIN AC) 100-10 MG/5ML solution Take 5 mLs by mouth every 4 hours as needed for cough. 118 mL 0    insulin aspart (NOVOLOG PEN) 100 UNIT/ML pen Inject 16 Units subcutaneously 2 times daily (before meals). 45 mL 3    insulin glargine (LANTUS PEN) 100 UNIT/ML pen Inject 40 Units subcutaneously every morning. 45 mL 3    insulin pen needle (32G X 4 MM) 32G X 4 MM miscellaneous Use 3 pen needles daily or as directed. 300 each 4    ipratropium - albuterol 0.5 mg/2.5 mg/3 mL (DUONEB) 0.5-2.5 (3) MG/3ML neb solution Take 1 vial (3 mLs) by nebulization 4 times daily. 360 mL 11    Lidocaine (LIDOCARE) 4 % Patch Place 1 patch onto the skin every 24 hours To prevent lidocaine toxicity, patient should be patch free for 12 hrs daily. 30 patch 1    meclizine (ANTIVERT) 25 MG tablet Take 1 tablet (25 mg) by mouth 3 times daily as needed for dizziness. 90 tablet 1    NEXIUM 40 MG DR capsule  Take 40 mg by mouth every morning (before breakfast).      polyethylene glycol (MIRALAX) 17 GM/Dose powder Take 17 g (1 Capful) by mouth daily 238 g 1    sennosides (SENOKOT) 8.6 MG tablet Take 1 tablet by mouth daily as needed for constipation. 30 tablet 3    sucralfate (CARAFATE) 1 GM/10ML suspension Take 10 mLs (1 g) by mouth 4 times daily. 3600 mL 1    tiZANidine (ZANAFLEX) 2 MG tablet Take 1 tablet (2 mg) by mouth 3 times daily as needed for muscle spasms. 270 tablet 1       No Known Allergies     Social History     Tobacco Use    Smoking status: Former     Current packs/day: 0.00     Average packs/day: 1 pack/day for 30.0 years (30.0 ttl pk-yrs)     Types: Cigarettes     Start date: 1973     Quit date: 2003     Years since quittin.8     Passive exposure: Never    Smokeless tobacco: Never    Tobacco comments:     No passive exposure   Substance Use Topics    Alcohol use: No       History   Drug Use No               Objective    /74 (BP Location: Right arm, Patient Position: Sitting, Cuff Size: Adult Regular)   Pulse 104   Temp 98.2  F (36.8  C) (Oral)   Resp 20   Ht 1.524 m (5')   Wt 54.6 kg (120 lb 6 oz)   SpO2 96%   BMI 23.51 kg/m     Estimated body mass index is 23.51 kg/m  as calculated from the following:    Height as of this encounter: 1.524 m (5').    Weight as of this encounter: 54.6 kg (120 lb 6 oz).  Physical Exam  GENERAL: alert and no distress  NECK: Supple  RESP: Significant wheezing both lung fields.  CV: regular rate and rhythm, normal S1 S2, no S3 or S4, no murmur, click or rub, no peripheral edema  ABDOMEN: soft, nontender, no hepatosplenomegaly, no masses and bowel sounds normal  PSYCH: mentation appears normal    Recent Labs   Lab Test 10/14/24  1050 24  0516 24  0756 06/15/24  2304 06/10/24  1057 24  0921 24  0830   HGB 12.6  --  12.6   < >  --    < > 13.8   * 226 260   < >  --    < > 221   INR  --   --   --   --   --   --  0.97   NA  138  --  135   < >  --    < > 140   POTASSIUM 4.1  --  4.3   < >  --    < > 3.4   CR 0.51  --  0.49*   < >  --    < > 0.57   A1C  --   --  7.3*  --  7.7*  --   --     < > = values in this interval not displayed.        Diagnostics  No labs were ordered during this visit.   Last EKG on 9/26/24.      Revised Cardiac Risk Index (RCRI)  The patient has the following serious cardiovascular risks for perioperative complications:                Signed Electronically by: Adrien Cardoza MD  A copy of this evaluation report is provided to the requesting physician.

## 2024-11-20 ENCOUNTER — ANESTHESIA EVENT (OUTPATIENT)
Dept: SURGERY | Facility: CLINIC | Age: 54
End: 2024-11-20
Payer: COMMERCIAL

## 2024-11-21 ENCOUNTER — ANESTHESIA (OUTPATIENT)
Dept: SURGERY | Facility: CLINIC | Age: 54
End: 2024-11-21
Payer: COMMERCIAL

## 2024-11-21 ENCOUNTER — HOSPITAL ENCOUNTER (OUTPATIENT)
Facility: CLINIC | Age: 54
Discharge: HOME | End: 2024-11-21
Attending: INTERNAL MEDICINE | Admitting: INTERNAL MEDICINE
Payer: COMMERCIAL

## 2024-11-21 VITALS
DIASTOLIC BLOOD PRESSURE: 71 MMHG | RESPIRATION RATE: 18 BRPM | SYSTOLIC BLOOD PRESSURE: 124 MMHG | TEMPERATURE: 97 F | OXYGEN SATURATION: 99 % | HEART RATE: 112 BPM

## 2024-11-21 LAB
GLUCOSE BLDC GLUCOMTR-MCNC: 235 MG/DL (ref 70–99)
UPPER GI ENDOSCOPY: NORMAL

## 2024-11-21 PROCEDURE — 710N000012 HC RECOVERY PHASE 2, PER MINUTE: Performed by: INTERNAL MEDICINE

## 2024-11-21 PROCEDURE — 370N000017 HC ANESTHESIA TECHNICAL FEE, PER MIN: Performed by: INTERNAL MEDICINE

## 2024-11-21 PROCEDURE — 250N000009 HC RX 250: Performed by: ANESTHESIOLOGY

## 2024-11-21 PROCEDURE — 250N000011 HC RX IP 250 OP 636: Performed by: ANESTHESIOLOGY

## 2024-11-21 PROCEDURE — 82962 GLUCOSE BLOOD TEST: CPT

## 2024-11-21 PROCEDURE — 272N000001 HC OR GENERAL SUPPLY STERILE: Performed by: INTERNAL MEDICINE

## 2024-11-21 PROCEDURE — 999N000141 HC STATISTIC PRE-PROCEDURE NURSING ASSESSMENT: Performed by: INTERNAL MEDICINE

## 2024-11-21 PROCEDURE — 88305 TISSUE EXAM BY PATHOLOGIST: CPT | Mod: 26 | Performed by: PATHOLOGY

## 2024-11-21 PROCEDURE — 88342 IMHCHEM/IMCYTCHM 1ST ANTB: CPT | Mod: 26 | Performed by: PATHOLOGY

## 2024-11-21 PROCEDURE — 360N000075 HC SURGERY LEVEL 2, PER MIN: Performed by: INTERNAL MEDICINE

## 2024-11-21 PROCEDURE — 88305 TISSUE EXAM BY PATHOLOGIST: CPT | Mod: TC | Performed by: INTERNAL MEDICINE

## 2024-11-21 RX ORDER — PROCHLORPERAZINE MALEATE 10 MG
10 TABLET ORAL EVERY 6 HOURS PRN
Status: CANCELLED | OUTPATIENT
Start: 2024-11-21

## 2024-11-21 RX ORDER — ONDANSETRON 4 MG/1
4 TABLET, ORALLY DISINTEGRATING ORAL EVERY 6 HOURS PRN
Status: CANCELLED | OUTPATIENT
Start: 2024-11-21

## 2024-11-21 RX ORDER — PROPOFOL 10 MG/ML
INJECTION, EMULSION INTRAVENOUS PRN
Status: DISCONTINUED | OUTPATIENT
Start: 2024-11-21 | End: 2024-11-21

## 2024-11-21 RX ORDER — NALOXONE HYDROCHLORIDE 0.4 MG/ML
0.2 INJECTION, SOLUTION INTRAMUSCULAR; INTRAVENOUS; SUBCUTANEOUS
Status: CANCELLED | OUTPATIENT
Start: 2024-11-21

## 2024-11-21 RX ORDER — ONDANSETRON 2 MG/ML
4 INJECTION INTRAMUSCULAR; INTRAVENOUS EVERY 6 HOURS PRN
Status: CANCELLED | OUTPATIENT
Start: 2024-11-21

## 2024-11-21 RX ORDER — LIDOCAINE 40 MG/G
CREAM TOPICAL
Status: DISCONTINUED | OUTPATIENT
Start: 2024-11-21 | End: 2024-11-21 | Stop reason: HOSPADM

## 2024-11-21 RX ORDER — ONDANSETRON 2 MG/ML
4 INJECTION INTRAMUSCULAR; INTRAVENOUS
Status: DISCONTINUED | OUTPATIENT
Start: 2024-11-21 | End: 2024-11-21 | Stop reason: HOSPADM

## 2024-11-21 RX ORDER — NALOXONE HYDROCHLORIDE 0.4 MG/ML
0.4 INJECTION, SOLUTION INTRAMUSCULAR; INTRAVENOUS; SUBCUTANEOUS
Status: CANCELLED | OUTPATIENT
Start: 2024-11-21

## 2024-11-21 RX ORDER — FLUMAZENIL 0.1 MG/ML
0.2 INJECTION, SOLUTION INTRAVENOUS
Status: CANCELLED | OUTPATIENT
Start: 2024-11-21 | End: 2024-11-21

## 2024-11-21 RX ORDER — LIDOCAINE HYDROCHLORIDE 10 MG/ML
INJECTION, SOLUTION INFILTRATION; PERINEURAL PRN
Status: DISCONTINUED | OUTPATIENT
Start: 2024-11-21 | End: 2024-11-21

## 2024-11-21 RX ADMIN — LIDOCAINE HYDROCHLORIDE 5 ML: 10 INJECTION, SOLUTION INFILTRATION; PERINEURAL at 10:57

## 2024-11-21 RX ADMIN — PROPOFOL 50 MG: 10 INJECTION, EMULSION INTRAVENOUS at 10:57

## 2024-11-21 RX ADMIN — PROPOFOL 50 MG: 10 INJECTION, EMULSION INTRAVENOUS at 11:02

## 2024-11-21 RX ADMIN — PROPOFOL 100 MG: 10 INJECTION, EMULSION INTRAVENOUS at 10:59

## 2024-11-21 ASSESSMENT — ACTIVITIES OF DAILY LIVING (ADL)
ADLS_ACUITY_SCORE: 0

## 2024-11-21 ASSESSMENT — COPD QUESTIONNAIRES: COPD: 1

## 2024-11-21 NOTE — ANESTHESIA CARE TRANSFER NOTE
Patient: Kulwant Amin    Procedure: Procedure(s):  ESOPHAGOGASTRODUODENOSCOPY with BIOPSY       Diagnosis: Dysphagia [R13.10]  Epigastric pain [R10.13]  Diagnosis Additional Information: No value filed.    Anesthesia Type:   MAC     Note:    Oropharynx: oropharynx clear of all foreign objects  Level of Consciousness: drowsy  Oxygen Supplementation: room air    Independent Airway: airway patency satisfactory and stable    Vital Signs Stable: post-procedure vital signs reviewed and stable  Report to RN Given: handoff report given  Patient transferred to: Phase II    Handoff Report: Identifed the Patient, Identified the Reponsible Provider, Reviewed the pertinent medical history, Discussed the surgical course, Reviewed Intra-OP anesthesia mangement and issues during anesthesia, Set expectations for post-procedure period and Allowed opportunity for questions and acknowledgement of understanding      Vitals:  Vitals Value Taken Time   BP     Temp     Pulse     Resp     SpO2         Electronically Signed By: KACEY Pino CRNA  November 21, 2024  11:14 AM

## 2024-11-21 NOTE — ANESTHESIA POSTPROCEDURE EVALUATION
Patient: Kulwant Amin    Procedure: Procedure(s):  ESOPHAGOGASTRODUODENOSCOPY with BIOPSY       Anesthesia Type:  MAC    Note:  Disposition: Outpatient   Postop Pain Control: Uneventful            Sign Out: Well controlled pain   PONV: No   Neuro/Psych: Uneventful            Sign Out: Acceptable/Baseline neuro status   Airway/Respiratory: Uneventful            Sign Out: Acceptable/Baseline resp. status   CV/Hemodynamics: Uneventful            Sign Out: Acceptable CV status; No obvious hypovolemia; No obvious fluid overload   Other NRE: NONE   DID A NON-ROUTINE EVENT OCCUR? No           Last vitals:  Vitals Value Taken Time   /99 11/21/24 1115   Temp 36.1  C (97  F) 11/21/24 1115   Pulse 102 11/21/24 1127   Resp 18 11/21/24 1115   SpO2 95 % 11/21/24 1127   Vitals shown include unfiled device data.    Electronically Signed By: Eddie Avalos MD  November 21, 2024  11:28 AM

## 2024-11-21 NOTE — INTERVAL H&P NOTE
I have reviewed the surgical (or preoperative) H&P that is linked to this encounter, and examined the patient. There are no significant changes    Clinical Conditions Present on Arrival:  Clinically Significant Risk Factors Present on Admission                  # Drug Induced Platelet Defect: home medication list includes an antiplatelet medication      # DMII: A1C = 7.3 % (Ref range: <5.7 %) within past 6 months

## 2024-11-21 NOTE — ANESTHESIA PREPROCEDURE EVALUATION
Anesthesia Pre-Procedure Evaluation    Patient: Kulwant Amin   MRN: [id number] : 1970        Procedure : Procedure(s):  ESOPHAGOGASTRODUODENOSCOPY          Past Medical History:   Diagnosis Date    Acute asthma exacerbation 2020    Anxiety     Arthritis     Asthma exacerbation 2015    Chronic obstructive pulmonary disease, unspecified COPD type (H)     COPD (chronic obstructive pulmonary disease) (H)     Coronary artery disease due to lipid rich plaque     Depression     Diabetes (H)     Epigastric pain 12/15/2021    Essential hypertension     GERD (gastroesophageal reflux disease)     Infection due to  novel coronavirus 2022    positive with COVID-19 on January 3, 2022    Latent tuberculosis 2019    Microcytic anemia 2019    Motion sickness     PONV (postoperative nausea and vomiting)     S/P coronary artery stent placement 2018    Sleep apnea     TB lung, latent     9 mos INH      Past Surgical History:   Procedure Laterality Date    CORONARY STENT PLACEMENT      CV CORONARY ANGIOGRAM N/A 2018    Procedure: Coronary Angiogram;  Surgeon: Angelo Serrano MD;  Location: [facility] Cath Lab;  Service:     CV CORONARY ANGIOGRAM N/A 2022    Procedure: Coronary Angiogram;  Surgeon: Irwin Cano MD;  Location: [facility] CATH LAB CV    CV CORONARY ANGIOGRAM  2022    Procedure: ;  Surgeon: Irwin Cano MD;  Location: [facility] CATH LAB CV    NE ESOPHAGOGASTRODUODENOSCOPY TRANSORAL DIAGNOSTIC N/A 12/10/2018    Procedure: ESOPHAGOGASTRODUODENOSCOPY (EGD);  Surgeon: Eddie Renteria MD;  Location: [facility];  Service: Gastroenterology    NE ESOPHAGOGASTRODUODENOSCOPY TRANSORAL DIAGNOSTIC N/A 12/3/2020    Procedure: ESOPHAGOGASTRODUODENOSCOPY (EGD) with biospies ;  Surgeon: Avi Crow MD;  Location: [facility];  Service: Gastroenterology    [facility] COLONOSCOPY W/WO BRUSH/WASH N/A 12/10/2018    Procedure: COLONOSCOPY with polypectomy  using biopsy forceps;  Surgeon: Eddie Renteria MD;  Location: [facility];  Service: Gastroenterology      No Known Allergies   Social History     Tobacco Use    Smoking status: Former     Current packs/day: 0.00     Average packs/day: 1 pack/day for 30.0 years (30.0 ttl pk-yrs)     Types: Cigarettes     Start date: 1973     Quit date: 2003     Years since quittin.9     Passive exposure: Never    Smokeless tobacco: Never    Tobacco comments:     No passive exposure   Substance Use Topics    Alcohol use: No      Wt Readings from Last 1 Encounters:   24 54.6 kg (120 lb 6 oz)        Anesthesia Evaluation            ROS/MED HX  ENT/Pulmonary:     (+) sleep apnea,                    Severe Persistent, asthma  Treatment: Inhaler daily,   COPD,              Neurologic:     (+)          CVA,                      Cardiovascular:     (+) Dyslipidemia hypertension- -  CAD -  - -                                      METS/Exercise Tolerance:     Hematologic:  - neg hematologic  ROS     Musculoskeletal:       GI/Hepatic:     (+) GERD,                   Renal/Genitourinary:  - neg Renal ROS     Endo:     (+)  type II DM,                    Psychiatric/Substance Use:     (+) psychiatric history anxiety and depression       Infectious Disease:  - neg infectious disease ROS     Malignancy:  - neg malignancy ROS     Other:  - neg other ROS    (+)  , H/O Chronic Pain,         Physical Exam    Airway  airway exam normal      Mallampati: I   TM distance: > 3 FB   Neck ROM: full   Mouth opening: > 3 cm    Respiratory Devices and Support         Dental       (+) Multiple visibly decayed, broken teeth      Cardiovascular   cardiovascular exam normal       Rhythm and rate: regular and normal     Pulmonary   pulmonary exam normal        breath sounds clear to auscultation           OUTSIDE LABS:  CBC:   Lab Results   Component Value Date    WBC 5.0 10/14/2024    WBC 9.6 2024    HGB 12.6 10/14/2024    HGB 12.6  09/26/2024    HCT 39.3 10/14/2024    HCT 39.4 09/26/2024     (L) 10/14/2024     09/29/2024     BMP:   Lab Results   Component Value Date     10/14/2024     09/26/2024    POTASSIUM 4.1 10/14/2024    POTASSIUM 4.3 09/26/2024    CHLORIDE 103 10/14/2024    CHLORIDE 99 09/26/2024    CO2 22 10/14/2024    CO2 23 09/26/2024    BUN 8.4 10/14/2024    BUN 8.8 09/26/2024    CR 0.51 10/14/2024    CR 0.49 (L) 09/26/2024     (H) 11/21/2024     (H) 10/14/2024     COAGS:   Lab Results   Component Value Date    PTT 24 03/01/2024    INR 0.97 03/01/2024     POC:   Lab Results   Component Value Date    HCGS Negative 12/07/2020     HEPATIC:   Lab Results   Component Value Date    ALBUMIN 4.1 10/14/2024    PROTTOTAL 6.9 10/14/2024    ALT 56 (H) 10/14/2024    AST 70 (H) 10/14/2024    ALKPHOS 116 10/14/2024    BILITOTAL 0.3 10/14/2024     OTHER:   Lab Results   Component Value Date    PH 7.39 01/06/2020    LACT 1.7 03/01/2024    A1C 7.3 (H) 09/26/2024    FREDY 9.4 10/14/2024    PHOS 4.5 01/13/2021    MAG 2.0 06/15/2024    LIPASE 47 07/09/2024    TSH 0.82 10/14/2024    T4 1.02 05/06/2022    T3 191 (H) 09/30/2015    CRP 1.0 (H) 08/03/2021    SED 25 07/25/2023       Anesthesia Plan    ASA Status:  4    NPO Status:  NPO Appropriate    Anesthesia Type: MAC.     - Reason for MAC: immobility needed      Maintenance: TIVA.        Consents    Anesthesia Plan(s) and associated risks, benefits, and realistic alternatives discussed. Questions answered and patient/representative(s) expressed understanding.     - Discussed:     - Discussed with:  Patient,       - Extended Intubation/Ventilatory Support Discussed: No.      - Patient is DNR/DNI Status: No     Use of blood products discussed: No .     Postoperative Care       PONV prophylaxis: Ondansetron (or other 5HT-3), Background Propofol Infusion     Comments:               Eddie Avalos MD    I have reviewed the pertinent notes and labs in the chart  from the past 30 days and (re)examined the patient.  Any updates or changes from those notes are reflected in this note.             # Drug Induced Platelet Defect: home medication list includes an antiplatelet medication   # Hypertension: Noted on problem list          # DMII: A1C = 7.3 % (Ref range: <5.7 %) within past 6 months        # COPD: noted on problem list

## 2024-11-22 LAB
PATH REPORT.COMMENTS IMP SPEC: NORMAL
PATH REPORT.COMMENTS IMP SPEC: NORMAL
PATH REPORT.FINAL DX SPEC: NORMAL
PATH REPORT.GROSS SPEC: NORMAL
PATH REPORT.MICROSCOPIC SPEC OTHER STN: NORMAL
PATH REPORT.RELEVANT HX SPEC: NORMAL
PHOTO IMAGE: NORMAL

## 2024-11-25 DIAGNOSIS — E11.9 TYPE 2 DIABETES MELLITUS TREATED WITH INSULIN (H): ICD-10-CM

## 2024-11-25 DIAGNOSIS — Z79.4 TYPE 2 DIABETES MELLITUS TREATED WITH INSULIN (H): ICD-10-CM

## 2024-11-25 NOTE — TELEPHONE ENCOUNTER
Clinic RN: Please contact patient because  should have lots of refills left on this medication. Because it is insulin we are not able to bounce it back to pharmacy and say refills on file. Instruction is to route to triage to call patient or pharmacy and check.

## 2024-12-06 DIAGNOSIS — Z76.0 ENCOUNTER FOR MEDICATION REFILL: ICD-10-CM

## 2024-12-09 ENCOUNTER — OFFICE VISIT (OUTPATIENT)
Dept: URGENT CARE | Facility: URGENT CARE | Age: 54
End: 2024-12-09
Payer: COMMERCIAL

## 2024-12-09 VITALS
RESPIRATION RATE: 24 BRPM | HEART RATE: 110 BPM | SYSTOLIC BLOOD PRESSURE: 120 MMHG | OXYGEN SATURATION: 96 % | DIASTOLIC BLOOD PRESSURE: 83 MMHG | TEMPERATURE: 98.4 F

## 2024-12-09 DIAGNOSIS — K59.00 CONSTIPATION, UNSPECIFIED CONSTIPATION TYPE: Primary | ICD-10-CM

## 2024-12-09 DIAGNOSIS — R10.84 ABDOMINAL PAIN, GENERALIZED: ICD-10-CM

## 2024-12-09 LAB
ALBUMIN SERPL BCG-MCNC: 4.1 G/DL (ref 3.5–5.2)
ALBUMIN UR-MCNC: NEGATIVE MG/DL
ALP SERPL-CCNC: 96 U/L (ref 40–150)
ALT SERPL W P-5'-P-CCNC: 45 U/L (ref 0–50)
ANION GAP SERPL CALCULATED.3IONS-SCNC: 8 MMOL/L (ref 7–15)
APPEARANCE UR: CLEAR
AST SERPL W P-5'-P-CCNC: 50 U/L (ref 0–45)
BACTERIA #/AREA URNS HPF: ABNORMAL /HPF
BASOPHILS # BLD AUTO: 0.1 10E3/UL (ref 0–0.2)
BASOPHILS NFR BLD AUTO: 1 %
BILIRUB SERPL-MCNC: 0.4 MG/DL
BILIRUB UR QL STRIP: NEGATIVE
BUN SERPL-MCNC: 6.2 MG/DL (ref 6–20)
CALCIUM SERPL-MCNC: 9 MG/DL (ref 8.8–10.4)
CHLORIDE SERPL-SCNC: 103 MMOL/L (ref 98–107)
COLOR UR AUTO: YELLOW
CREAT SERPL-MCNC: 0.61 MG/DL (ref 0.51–0.95)
EGFRCR SERPLBLD CKD-EPI 2021: >90 ML/MIN/1.73M2
EOSINOPHIL # BLD AUTO: 0.4 10E3/UL (ref 0–0.7)
EOSINOPHIL NFR BLD AUTO: 4 %
ERYTHROCYTE [DISTWIDTH] IN BLOOD BY AUTOMATED COUNT: 14.3 % (ref 10–15)
GLUCOSE SERPL-MCNC: 190 MG/DL (ref 70–99)
GLUCOSE UR STRIP-MCNC: NEGATIVE MG/DL
HCO3 SERPL-SCNC: 27 MMOL/L (ref 22–29)
HCT VFR BLD AUTO: 42.3 % (ref 35–47)
HGB BLD-MCNC: 13.5 G/DL (ref 11.7–15.7)
HGB UR QL STRIP: NEGATIVE
IMM GRANULOCYTES # BLD: 0 10E3/UL
IMM GRANULOCYTES NFR BLD: 0 %
KETONES UR STRIP-MCNC: NEGATIVE MG/DL
LEUKOCYTE ESTERASE UR QL STRIP: ABNORMAL
LYMPHOCYTES # BLD AUTO: 2.5 10E3/UL (ref 0.8–5.3)
LYMPHOCYTES NFR BLD AUTO: 25 %
MCH RBC QN AUTO: 27.3 PG (ref 26.5–33)
MCHC RBC AUTO-ENTMCNC: 31.9 G/DL (ref 31.5–36.5)
MCV RBC AUTO: 86 FL (ref 78–100)
MONOCYTES # BLD AUTO: 0.6 10E3/UL (ref 0–1.3)
MONOCYTES NFR BLD AUTO: 6 %
NEUTROPHILS # BLD AUTO: 6.5 10E3/UL (ref 1.6–8.3)
NEUTROPHILS NFR BLD AUTO: 65 %
NITRATE UR QL: NEGATIVE
PH UR STRIP: 6 [PH] (ref 5–8)
PLATELET # BLD AUTO: 214 10E3/UL (ref 150–450)
POTASSIUM SERPL-SCNC: 4.3 MMOL/L (ref 3.4–5.3)
PROT SERPL-MCNC: 6.8 G/DL (ref 6.4–8.3)
RBC # BLD AUTO: 4.95 10E6/UL (ref 3.8–5.2)
RBC #/AREA URNS AUTO: ABNORMAL /HPF
SODIUM SERPL-SCNC: 138 MMOL/L (ref 135–145)
SP GR UR STRIP: <=1.005 (ref 1–1.03)
SQUAMOUS #/AREA URNS AUTO: ABNORMAL /LPF
UROBILINOGEN UR STRIP-ACNC: 0.2 E.U./DL
WBC # BLD AUTO: 10.1 10E3/UL (ref 4–11)
WBC #/AREA URNS AUTO: ABNORMAL /HPF

## 2024-12-09 PROCEDURE — 99214 OFFICE O/P EST MOD 30 MIN: CPT | Performed by: PHYSICIAN ASSISTANT

## 2024-12-09 PROCEDURE — 81001 URINALYSIS AUTO W/SCOPE: CPT | Performed by: PHYSICIAN ASSISTANT

## 2024-12-09 PROCEDURE — 36415 COLL VENOUS BLD VENIPUNCTURE: CPT | Performed by: PHYSICIAN ASSISTANT

## 2024-12-09 PROCEDURE — 80053 COMPREHEN METABOLIC PANEL: CPT | Performed by: PHYSICIAN ASSISTANT

## 2024-12-09 PROCEDURE — 85025 COMPLETE CBC W/AUTO DIFF WBC: CPT | Performed by: PHYSICIAN ASSISTANT

## 2024-12-09 RX ORDER — POLYETHYLENE GLYCOL 3350 17 G/17G
1 POWDER, FOR SOLUTION ORAL DAILY PRN
Qty: 255 G | Refills: 0 | Status: SHIPPED | OUTPATIENT
Start: 2024-12-09

## 2024-12-09 RX ORDER — INSULIN GLARGINE 100 [IU]/ML
INJECTION, SOLUTION SUBCUTANEOUS
Qty: 15 ML | Refills: 3 | OUTPATIENT
Start: 2024-12-09

## 2024-12-09 RX ORDER — ATORVASTATIN CALCIUM 80 MG/1
80 TABLET, FILM COATED ORAL DAILY
Qty: 90 TABLET | Refills: 0 | Status: SHIPPED | OUTPATIENT
Start: 2024-12-09

## 2024-12-09 NOTE — TELEPHONE ENCOUNTER
Called pharmacy to check on refill request that was sent in on 11/25. Pharmacy stated that they sent the request but will note down that patient still has refill on file (3 refills). No further actions needed.       Yudy Isbell MSN, RN   Triage Nurse   [facility]

## 2024-12-09 NOTE — PROGRESS NOTES
Chief Complaint   Patient presents with    Abdominal Pain     Lower abdominal pain x 4-5 days. No diarrhea, constipated. Unsure if tender       ASSESSMENT/PLAN:  Kulwant was seen today for abdominal pain.    Diagnoses and all orders for this visit:    Constipation, unspecified constipation type  -     polyethylene glycol (MIRALAX) 17 GM/Dose powder; Take 17 g (1 Capful) by mouth daily as needed for constipation.    Abdominal pain, generalized  -     Cancel: UA with Microscopic reflex to Culture - lab collect; Future  -     Cancel: UA with Microscopic reflex to Culture - lab collect  -     UA Macroscopic with reflex to Microscopic and Culture - Clinic Collect  -     UA Microscopic with Reflex to Culture  -     CBC with platelets and differential; Future  -     Comprehensive metabolic panel (BMP + Alb, Alk Phos, ALT, AST, Total. Bili, TP); Future    Consider broad differential diagnosis including pyelonephritis, UTI, colitis, diverticulitis, appendicitis, constipation among others    Nonacute abdomen with only mild pain and no guarding.  UA negative.  CBC showed no elevated white count.  Comp pending    Think this is most likely constipation or a mild case of diverticulitis.  Would recommend initial treatment with MiraLAX.  She takes this daily but would recommend increasing the dosage until bowels are regular and smooth.  Discussed return precautions and follow-up closely with PCP    Denny Mace PA-C  35 minutes spent by me on the date of the encounter doing chart review, history and exam, documentation and further activities per the note    SUBJECTIVE:  Kulwant is a 54 year old female who presents to urgent care with about 4 days of lower abdominal pain.  Is more noticeable on the left side but also the middle.  She has been constipated but had a bowel movement this morning.  Felt a little bit better afterwards.  No fever or chills.  No nausea or vomiting.  No diarrhea.  No urinary symptoms.  No back pain..    ROS:  Pertinent ROS neg other than the symptoms noted above in the HPI.     OBJECTIVE:  /83   Pulse 110   Temp 98.4  F (36.9  C) (Oral)   Resp 24   SpO2 96%    GENERAL: alert and no distress  EYES: Eyes grossly normal to inspection, PERRL and conjunctivae and sclerae normal  CV: regular rate and rhythm, normal S1 S2, no S3 or S4, no murmur, click or rub, no peripheral edema   ABDOMEN: soft, mild suprapubic abdominal tenderness without any rebound or guarding, no masses and bowel sounds normal    DIAGNOSTICS    Results for orders placed or performed in visit on 12/09/24   UA Macroscopic with reflex to Microscopic and Culture - Clinic Collect     Status: Abnormal    Specimen: Urine, Clean Catch   Result Value Ref Range    Color Urine Yellow Colorless, Straw, Light Yellow, Yellow    Appearance Urine Clear Clear    Glucose Urine Negative Negative mg/dL    Bilirubin Urine Negative Negative    Ketones Urine Negative Negative mg/dL    Specific Gravity Urine <=1.005 1.005 - 1.030    Blood Urine Negative Negative    pH Urine 6.0 5.0 - 8.0    Protein Albumin Urine Negative Negative mg/dL    Urobilinogen Urine 0.2 0.2, 1.0 E.U./dL    Nitrite Urine Negative Negative    Leukocyte Esterase Urine Trace (A) Negative   UA Microscopic with Reflex to Culture     Status: Abnormal   Result Value Ref Range    Bacteria Urine Few (A) None Seen /HPF    RBC Urine None Seen 0-2 /HPF /HPF    WBC Urine 0-5 0-5 /HPF /HPF    Squamous Epithelials Urine Few (A) None Seen /LPF    Narrative    Urine Culture not indicated   CBC with platelets and differential     Status: None   Result Value Ref Range    WBC Count 10.1 4.0 - 11.0 10e3/uL    RBC Count 4.95 3.80 - 5.20 10e6/uL    Hemoglobin 13.5 11.7 - 15.7 g/dL    Hematocrit 42.3 35.0 - 47.0 %    MCV 86 78 - 100 fL    MCH 27.3 26.5 - 33.0 pg    MCHC 31.9 31.5 - 36.5 g/dL    RDW 14.3 10.0 - 15.0 %    Platelet Count 214 150 - 450 10e3/uL    % Neutrophils 65 %    % Lymphocytes 25 %    % Monocytes 6 %     % Eosinophils 4 %    % Basophils 1 %    % Immature Granulocytes 0 %    Absolute Neutrophils 6.5 1.6 - 8.3 10e3/uL    Absolute Lymphocytes 2.5 0.8 - 5.3 10e3/uL    Absolute Monocytes 0.6 0.0 - 1.3 10e3/uL    Absolute Eosinophils 0.4 0.0 - 0.7 10e3/uL    Absolute Basophils 0.1 0.0 - 0.2 10e3/uL    Absolute Immature Granulocytes 0.0 <=0.4 10e3/uL   CBC with platelets and differential     Status: None    Narrative    The following orders were created for panel order CBC with platelets and differential.  Procedure                               Abnormality         Status                     ---------                               -----------         ------                     CBC with platelets and d...[[id number]]                      Final result                 Please view results for these tests on the individual orders.        Current Outpatient Medications   Medication Sig Dispense Refill    acetaminophen (TYLENOL) 500 MG tablet TAKE 1 PILL BY MOUTH EVERY 6 HOURS AS NEEDED FOR PAIN 100 tablet 10    albuterol (PROAIR HFA/PROVENTIL HFA/VENTOLIN HFA) 108 (90 Base) MCG/ACT inhaler Inhale 2 puffs into the lungs every 4 hours as needed for shortness of breath 18 g 11    albuterol (PROVENTIL) (2.5 MG/3ML) 0.083% neb solution Take 1 vial (2.5 mg) by nebulization every 6 hours as needed for shortness of breath, wheezing or cough 360 mL 11    amitriptyline (ELAVIL) 50 MG tablet TAKE 1 TABLET (50 MG) BY MOUTH AT BEDTIME 90 tablet 1    aspirin 81 MG EC tablet Take 81 mg by mouth daily.      atorvastatin (LIPITOR) 80 MG tablet TAKE 1 TABLET (80 MG) BY MOUTH DAILY 90 tablet 0    calcium carbonate (TUMS) 500 MG chewable tablet Take 1 chew tab by mouth 2 times daily as needed for heartburn.      colchicine (COLCRYS) 0.6 MG tablet Take 1 tablet (0.6 mg) by mouth daily 90 tablet 3    Continuous Glucose Sensor (FREESTYLE MONICA 2 SENSOR) [facility] 1 EACH EVERY 14 DAYS USE 1 SENSOR EVERY 14 DAYS. USE TO READ BLOOD SUGARS PER 'S  INSTRUCTIONS. 2 each 11    CONTOUR NEXT TEST test strip USE 1 EACH AS DIRECTED 3 (THREE) TIMES A DAY. 50 strip 11    diclofenac (VOLTAREN) 1 % topical gel Apply 4 g topically 4 times daily 350 g 3    ferrous sulfate (FEROSUL) 325 (65 Fe) MG tablet Take 1 tablet (325 mg) by mouth daily (with breakfast). 90 tablet 4    FLUoxetine (PROZAC) 20 MG capsule Take 1 capsule (20 mg) by mouth daily. 90 capsule 1    fluticasone-vilanterol (BREO ELLIPTA) 200-25 MCG/ACT inhaler Inhale 1 puff into the lungs daily 60 each 11    gabapentin (NEURONTIN) 600 MG tablet TAKE 1 PILL (600 MG) BY MOUTH 3 TIMES DAILY 90 tablet 3    galcanezumab-gnlm (EMGALITY) 120 MG/ML injection Inject 2 mLs (240 mg) subcutaneously every 28 days. Loading dose. 2 mL 0    galcanezumab-gnlm (EMGALITY) 120 MG/ML injection Inject 1 mL (120 mg) subcutaneously every 28 days. 1 mL 11    insulin aspart (NOVOLOG PEN) 100 UNIT/ML pen Inject 16 Units subcutaneously 2 times daily (before meals). 45 mL 3    insulin glargine (LANTUS PEN) 100 UNIT/ML pen Inject 40 Units subcutaneously every morning. 45 mL 3    insulin pen needle (32G X 4 MM) 32G X 4 MM miscellaneous Use 3 pen needles daily or as directed. 300 each 4    ipratropium - albuterol 0.5 mg/2.5 mg/3 mL (DUONEB) 0.5-2.5 (3) MG/3ML neb solution Take 1 vial (3 mLs) by nebulization 4 times daily. 360 mL 11    Lidocaine (LIDOCARE) 4 % Patch Place 1 patch onto the skin every 24 hours To prevent lidocaine toxicity, patient should be patch free for 12 hrs daily. 30 patch 1    lisinopril (ZESTRIL) 5 MG tablet Take 1 tablet (5 mg) by mouth daily. 30 tablet 3    meclizine (ANTIVERT) 25 MG tablet Take 1 tablet (25 mg) by mouth 3 times daily as needed for dizziness. 90 tablet 1    metFORMIN (GLUCOPHAGE XR) 500 MG 24 hr tablet Take 2 tablets (1,000 mg) by mouth 2 times daily (with meals) 360 tablet 3    metoprolol tartrate (LOPRESSOR) 25 MG tablet TAKE 1 TABLET (25 MG TOTAL) BY MOUTH 2 (TWO) TIMES A DAY FOR BLOOD PRESSURE 180  tablet 3    montelukast (SINGULAIR) 10 MG tablet Take 1 tablet (10 mg) by mouth at bedtime 30 tablet 11    NEXIUM 40 MG DR capsule Take 40 mg by mouth every morning (before breakfast).      polyethylene glycol (MIRALAX) 17 GM/Dose powder Take 17 g (1 Capful) by mouth daily 238 g 1    sennosides (SENOKOT) 8.6 MG tablet Take 1 tablet by mouth daily as needed for constipation. 30 tablet 3    sucralfate (CARAFATE) 1 GM/10ML suspension Take 10 mLs (1 g) by mouth 4 times daily. 3600 mL 1    tiotropium (SPIRIVA RESPIMAT) 2.5 MCG/ACT inhaler INHALE 2 PUFFS INTO THE LUNGS DAILY 4 g 3    tiZANidine (ZANAFLEX) 2 MG tablet Take 1 tablet (2 mg) by mouth 3 times daily as needed for muscle spasms. 270 tablet 1    vitamin D3 (CHOLECALCIFEROL) 10 MCG (400 UNIT) capsule Take 1 capsule (400 Units) by mouth daily. 90 capsule 3    Vitamin D3 (VITAMIN D) 10 MCG (400 UNIT) tablet Take by mouth daily.       Current Facility-Administered Medications   Medication Dose Route Frequency Provider Last Rate Last Admin    tezepelumab-ekko (TEZSPIRE) injection 210 mg  210 mg Subcutaneous q28 days Elizabeth Marie MD   210 mg at 03/28/24 1039      Patient Active Problem List   Diagnosis    Pulmonary nodule, right    Environmental allergies    TB lung, latent    COPD (chronic obstructive pulmonary disease)/Asthma     Essential hypertension    Cataracts, bilateral    Corneal opacity    Irregular astigmatism    Anxiety    Chronic nonintractable headache, unspecified headache type    Coronary artery disease due to lipid rich plaque    Dizziness    Moderate persistent asthma    Unspecified visual loss    GERD (gastroesophageal reflux disease)    Dental caries    H/O arterial ischemic stroke    Constipation    Dysphagia    Chronic abdominal pain    Insomnia    FLACO (obstructive sleep apnea)- mild (AHI 14)    Type 2 diabetes mellitus treated with insulin (H)    Major depression, recurrent (H)    Chronic low back pain without sciatica, unspecified back pain  laterality    Shortness of breath    Poorly controlled persistent asthma      Past Medical History:   Diagnosis Date    Acute asthma exacerbation 01/06/2020    Anxiety     Arthritis     Asthma exacerbation 11/19/2015    Chronic obstructive pulmonary disease, unspecified COPD type (H)     COPD (chronic obstructive pulmonary disease) (H)     Coronary artery disease due to lipid rich plaque     Depression     Diabetes (H)     Epigastric pain 12/15/2021    Essential hypertension     GERD (gastroesophageal reflux disease)     Infection due to 2019 novel coronavirus 01/03/2022    positive with COVID-19 on January 3, 2022    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    Microcytic anemia 11/17/2019    Motion sickness     PONV (postoperative nausea and vomiting)     S/P coronary artery stent placement 02/02/2018    Sleep apnea     TB lung, latent     9 mos INH     Past Surgical History:   Procedure Laterality Date    CORONARY STENT PLACEMENT  2018    CV CORONARY ANGIOGRAM N/A 1/25/2018    Procedure: Coronary Angiogram;  Surgeon: Angelo Serrano MD;  Location: [facility] Cath Lab;  Service:     CV CORONARY ANGIOGRAM N/A 5/5/2022    Procedure: Coronary Angiogram;  Surgeon: Irwin Cano MD;  Location: [facility] CATH [facility] CV    CV CORONARY ANGIOGRAM  5/5/2022    Procedure: ;  Surgeon: Irwin Cano MD;  Location: [facility] LAB CV    ESOPHAGOSCOPY, GASTROSCOPY, DUODENOSCOPY (EGD), COMBINED N/A 11/21/2024    Procedure: ESOPHAGOGASTRODUODENOSCOPY with BIOPSY;  Surgeon: Gomez Bella MD;  Location: [facility] OR    LA ESOPHAGOGASTRODUODENOSCOPY TRANSORAL DIAGNOSTIC N/A 12/10/2018    Procedure: ESOPHAGOGASTRODUODENOSCOPY (EGD);  Surgeon: Eddie Renteria MD;  Location: [facility] GI;  Service: Gastroenterology    LA ESOPHAGOGASTRODUODENOSCOPY TRANSORAL DIAGNOSTIC N/A 12/3/2020    Procedure: ESOPHAGOGASTRODUODENOSCOPY (EGD) with biospies ;  Surgeon: Avi Crow MD;  Location: [facility] GI;  Service:  Gastroenterology    [facility] COLONOSCOPY W/WO BRUSH/WASH N/A 12/10/2018    Procedure: COLONOSCOPY with polypectomy using biopsy forceps;  Surgeon: Eddie Renteria MD;  Location: [facility];  Service: Gastroenterology     Family History   Problem Relation Age of Onset    Other - See Comments Mother          of an intestinal problem    Ulcers Father          of gastritis    Breast Cancer No family hx of     Ovarian Cancer No family hx of     Colon Cancer No family hx of      Social History     Tobacco Use    Smoking status: Former     Current packs/day: 0.00     Average packs/day: 1 pack/day for 30.0 years (30.0 ttl pk-yrs)     Types: Cigarettes     Start date: 1973     Quit date: 2003     Years since quittin.9     Passive exposure: Never    Smokeless tobacco: Never    Tobacco comments:     No passive exposure   Substance Use Topics    Alcohol use: No              The plan of care was discussed with the patient. They understand and agree with the course of treatment prescribed. A printed summary was given including instructions and medications.  The use of [email or web address] dictation services may have been used to construct the content in this note; any grammatical or spelling errors are non-intentional. Please contact the author of this note directly if you are in need of any clarification.

## 2024-12-09 NOTE — PATIENT INSTRUCTIONS
Return to the clinic if she develops worsening pain, fevers, chills, diarrhea, nausea, vomiting or any new or worsening symptoms

## 2024-12-16 DIAGNOSIS — Z76.0 ENCOUNTER FOR MEDICATION REFILL: ICD-10-CM

## 2024-12-16 DIAGNOSIS — M54.41 CHRONIC BILATERAL LOW BACK PAIN WITH BILATERAL SCIATICA: ICD-10-CM

## 2024-12-16 DIAGNOSIS — G89.29 CHRONIC BILATERAL LOW BACK PAIN WITH BILATERAL SCIATICA: ICD-10-CM

## 2024-12-16 DIAGNOSIS — M54.42 CHRONIC BILATERAL LOW BACK PAIN WITH BILATERAL SCIATICA: ICD-10-CM

## 2024-12-16 RX ORDER — GABAPENTIN 600 MG/1
TABLET ORAL
Qty: 90 TABLET | Refills: 3 | Status: SHIPPED | OUTPATIENT
Start: 2024-12-16

## 2024-12-16 NOTE — TELEPHONE ENCOUNTER
Last Written Prescription Date:  8/27/2024  Last Fill Quantity: 90,  # refills: 3   Last office visit: 3/4/2024 ; last virtual visit: Visit date not found with prescribing provider:     Future Office Visit:   Next 5 appointments (look out 90 days)      Dec 27, 2024 1:30 PM  (Arrive by 1:10 PM)  Provider Visit with Adrien Cardoza MD, VIRTUAL   [facility] ([facility]) [address] [place] [address]  [phone]     Elliott 15, 2025 11:00 AM  MTM New with Malu Gil, PharmD  [facility] ([facility]) [address] [facility] [address]  [place] [address]  [phone]               Requested Prescriptions   Pending Prescriptions Disp Refills    gabapentin (NEURONTIN) 600 MG tablet [Pharmacy Med Name: GABAPENTIN 600 MG TABS 600 Tablet] 90 tablet 3     Sig: TAKE 1 PILL (600 MG) BY MOUTH 3 TIMES DAILY       There is no refill protocol information for this order

## 2024-12-19 DIAGNOSIS — Z53.9 DIAGNOSIS NOT YET DEFINED: Primary | ICD-10-CM

## 2024-12-19 PROCEDURE — G0179 MD RECERTIFICATION HHA PT: HCPCS | Performed by: FAMILY MEDICINE

## 2024-12-24 ENCOUNTER — TRANSFERRED RECORDS (OUTPATIENT)
Dept: HEALTH INFORMATION MANAGEMENT | Facility: CLINIC | Age: 54
End: 2024-12-24
Payer: COMMERCIAL

## 2024-12-26 ENCOUNTER — NURSE TRIAGE (OUTPATIENT)
Dept: NURSING | Facility: CLINIC | Age: 54
End: 2024-12-26
Payer: COMMERCIAL

## 2024-12-26 DIAGNOSIS — J44.9 CHRONIC OBSTRUCTIVE PULMONARY DISEASE, UNSPECIFIED COPD TYPE (H): Primary | Chronic | ICD-10-CM

## 2024-12-26 RX ORDER — PREDNISONE 10 MG/1
TABLET ORAL
Qty: 30 TABLET | Refills: 0 | Status: SHIPPED | OUTPATIENT
Start: 2024-12-26

## 2024-12-26 NOTE — TELEPHONE ENCOUNTER
Cold call:  Daughter in law  (DIL), Billy,calling for pt, live w/ pt.  Calling for prednisone rx- states usually gets Rx for 20 mg tabs  States coughing has gotten worse the last couple days,    Dry cough  States SOB, has gotten little worse when sitting up, moving    No change in dizziness, states always alittle dizziness  Able to continue activity as before          Pt of Tamra Duong MD    High priority note to Pulmonary for call back to pt./DIL with plan   DIL--Billy # [phone]    [facility] - [place], [place] - [address]      Reason for Disposition   Continuous (nonstop) coughing interferes with work or school and no improvement using cough treatment per Care Advice    Additional Information   Negative: Bluish (or gray) lips or face   Negative: SEVERE difficulty breathing (e.g., struggling for each breath, speaks in single words)   Negative: Rapid onset of cough and has hives   Negative: Coughing started suddenly after medicine, an allergic food or bee sting   Negative: Difficulty breathing after exposure to flames, smoke, or fumes   Negative: Sounds like a life-threatening emergency to the triager   Negative: Previous asthma attacks and this feels like asthma attack   Negative: Dry cough (non-productive; no sputum or minimal clear sputum) and within 14 days of COVID-19 Exposure   Negative: MODERATE difficulty breathing (e.g., speaks in phrases, SOB even at rest, pulse 100-120) and still present when not coughing   Negative: Chest pain present when not coughing   Negative: Passed out (e.g., fainted, lost consciousness, blacked out and was not responding)   Negative: MILD difficulty breathing (e.g., minimal/no SOB at rest, SOB with walking, pulse <100) and still present when not coughing   Negative: Coughed up > 1 tablespoon (15 ml) blood (Exception: Blood-tinged sputum.)   Negative: Fever > 103 F (39.4 C)   Negative: Fever > 101 F (38.3 C) and over 60 years of age   Negative: Fever > 100  "F (37.8 C) and has diabetes mellitus or a weak immune system (e.g., HIV positive, cancer chemotherapy, organ transplant, splenectomy, chronic steroids)   Negative: Fever > 100 F (37.8 C) and bedridden (e.g., CVA, chronic illness, recovering from surgery)   Negative: Increasing ankle swelling   Negative: Wheezing is present   Negative: SEVERE coughing spells (e.g., whooping sound after coughing, vomiting after coughing)   Negative: Coughing up aruna-colored (reddish-brown) or blood-tinged sputum   Negative: Fever present > 3 days (72 hours)   Negative: Fever returns after gone for over 24 hours and symptoms worse or not improved   Negative: Using nasal washes and pain medicine > 24 hours and sinus pain persists   Negative: Known COPD or other severe lung disease (i.e., bronchiectasis, cystic fibrosis, lung surgery) and symptoms getting worse (i.e., increased sputum purulence or amount, increased breathing difficulty)   Negative: Patient sounds very sick or weak to the triager    Answer Assessment - Initial Assessment Questions  1. ONSET: \"When did the cough begin?\"   Daughter in law calling for pt, live w/ pt.  Calling for prednisone rx- states usually gets Rx for 20 mg tabs  States coughing has gotten worse the last couple days,    Dry cough  States SOB, has gotten little worse when sitting up, moving    No change in dizziness, states always alittle dizziness  Able to continue activity as before        2. SEVERITY: \"How bad is the cough today?\"        3. SPUTUM: \"Describe the color of your sputum\" (e.g., none, dry cough; clear, white, yellow, green)      dry  4. HEMOPTYSIS: \"Are you coughing up any blood?\" If Yes, ask: \"How much?\" (e.g., flecks, streaks, tablespoons, etc.)      no  5. DIFFICULTY BREATHING: \"Are you having difficulty breathing?\" If Yes, ask: \"How bad is it?\" (e.g., mild, moderate, severe)       Mild-mod per Daughter in law  6. FEVER: \"Do you have a fever?\" If Yes, ask: \"What is your temperature, how " "was it measured, and when did it start?\"      none  7. CARDIAC HISTORY: \"Do you have any history of heart disease?\" (e.g., heart attack, congestive heart failure)         8. LUNG HISTORY: \"Do you have any history of lung disease?\"  (e.g., pulmonary embolus, asthma, emphysema)        9. PE RISK FACTORS: \"Do you have a history of blood clots?\" (or: recent major surgery, recent prolonged travel, bedridden)        10. OTHER SYMPTOMS: \"Do you have any other symptoms?\" (e.g., runny nose, wheezing, chest pain)        No pain  Some wheezing- not much  11. PREGNANCY: \"Is there any chance you are pregnant?\" \"When was your last menstrual period?\"          12. TRAVEL: \"Have you traveled out of the country in the last month?\" (e.g., travel history, exposures)    Protocols used: Cough-A-OH    "

## 2024-12-26 NOTE — TELEPHONE ENCOUNTER
Spoke with Billy.  Will send in prescription for patient's action plan:  12-day prednisone taper.    Billy states coughing.  Not productive, dry cough. No fevers.

## 2024-12-29 ENCOUNTER — HOSPITAL ENCOUNTER (EMERGENCY)
Facility: HOSPITAL | Age: 54
Discharge: HOME | End: 2024-12-29
Attending: EMERGENCY MEDICINE
Payer: COMMERCIAL

## 2024-12-29 ENCOUNTER — APPOINTMENT (OUTPATIENT)
Dept: RADIOLOGY | Facility: HOSPITAL | Age: 54
End: 2024-12-29
Attending: EMERGENCY MEDICINE
Payer: COMMERCIAL

## 2024-12-29 VITALS
HEART RATE: 91 BPM | TEMPERATURE: 97.8 F | RESPIRATION RATE: 24 BRPM | DIASTOLIC BLOOD PRESSURE: 78 MMHG | WEIGHT: 124 LBS | SYSTOLIC BLOOD PRESSURE: 99 MMHG | OXYGEN SATURATION: 100 % | BODY MASS INDEX: 24.22 KG/M2

## 2024-12-29 DIAGNOSIS — J40 BRONCHITIS: ICD-10-CM

## 2024-12-29 DIAGNOSIS — J44.1 COPD EXACERBATION (H): ICD-10-CM

## 2024-12-29 LAB
ANION GAP SERPL CALCULATED.3IONS-SCNC: 11 MMOL/L (ref 7–15)
BASOPHILS # BLD AUTO: 0.1 10E3/UL (ref 0–0.2)
BASOPHILS NFR BLD AUTO: 1 %
BUN SERPL-MCNC: 15.7 MG/DL (ref 6–20)
CALCIUM SERPL-MCNC: 9.8 MG/DL (ref 8.8–10.4)
CHLORIDE SERPL-SCNC: 103 MMOL/L (ref 98–107)
CREAT SERPL-MCNC: 0.49 MG/DL (ref 0.51–0.95)
EGFRCR SERPLBLD CKD-EPI 2021: >90 ML/MIN/1.73M2
EOSINOPHIL # BLD AUTO: 0.4 10E3/UL (ref 0–0.7)
EOSINOPHIL NFR BLD AUTO: 5 %
ERYTHROCYTE [DISTWIDTH] IN BLOOD BY AUTOMATED COUNT: 14.6 % (ref 10–15)
FLUAV RNA SPEC QL NAA+PROBE: NEGATIVE
FLUBV RNA RESP QL NAA+PROBE: NEGATIVE
GLUCOSE SERPL-MCNC: 126 MG/DL (ref 70–99)
HCO3 SERPL-SCNC: 24 MMOL/L (ref 22–29)
HCT VFR BLD AUTO: 40.2 % (ref 35–47)
HGB BLD-MCNC: 13.4 G/DL (ref 11.7–15.7)
IMM GRANULOCYTES # BLD: 0 10E3/UL
IMM GRANULOCYTES NFR BLD: 0 %
LYMPHOCYTES # BLD AUTO: 2.6 10E3/UL (ref 0.8–5.3)
LYMPHOCYTES NFR BLD AUTO: 30 %
MCH RBC QN AUTO: 27.9 PG (ref 26.5–33)
MCHC RBC AUTO-ENTMCNC: 33.3 G/DL (ref 31.5–36.5)
MCV RBC AUTO: 84 FL (ref 78–100)
MONOCYTES # BLD AUTO: 0.6 10E3/UL (ref 0–1.3)
MONOCYTES NFR BLD AUTO: 7 %
NEUTROPHILS # BLD AUTO: 5 10E3/UL (ref 1.6–8.3)
NEUTROPHILS NFR BLD AUTO: 58 %
NRBC # BLD AUTO: 0 10E3/UL
NRBC BLD AUTO-RTO: 0 /100
NT-PROBNP SERPL-MCNC: 56 PG/ML (ref 0–900)
PLATELET # BLD AUTO: 180 10E3/UL (ref 150–450)
POTASSIUM SERPL-SCNC: 3.8 MMOL/L (ref 3.4–5.3)
RBC # BLD AUTO: 4.8 10E6/UL (ref 3.8–5.2)
RSV RNA SPEC NAA+PROBE: NEGATIVE
SARS-COV-2 RNA RESP QL NAA+PROBE: NEGATIVE
SODIUM SERPL-SCNC: 138 MMOL/L (ref 135–145)
TROPONIN T SERPL HS-MCNC: <6 NG/L
WBC # BLD AUTO: 8.6 10E3/UL (ref 4–11)

## 2024-12-29 PROCEDURE — 85025 COMPLETE CBC W/AUTO DIFF WBC: CPT | Performed by: EMERGENCY MEDICINE

## 2024-12-29 PROCEDURE — 84484 ASSAY OF TROPONIN QUANT: CPT | Performed by: EMERGENCY MEDICINE

## 2024-12-29 PROCEDURE — 83880 ASSAY OF NATRIURETIC PEPTIDE: CPT | Performed by: EMERGENCY MEDICINE

## 2024-12-29 PROCEDURE — 94640 AIRWAY INHALATION TREATMENT: CPT

## 2024-12-29 PROCEDURE — 71046 X-RAY EXAM CHEST 2 VIEWS: CPT

## 2024-12-29 PROCEDURE — 250N000009 HC RX 250: Performed by: EMERGENCY MEDICINE

## 2024-12-29 PROCEDURE — 999N000157 HC STATISTIC RCP TIME EA 10 MIN

## 2024-12-29 PROCEDURE — 36415 COLL VENOUS BLD VENIPUNCTURE: CPT | Performed by: EMERGENCY MEDICINE

## 2024-12-29 PROCEDURE — 250N000011 HC RX IP 250 OP 636: Performed by: EMERGENCY MEDICINE

## 2024-12-29 PROCEDURE — 87637 SARSCOV2&INF A&B&RSV AMP PRB: CPT | Performed by: EMERGENCY MEDICINE

## 2024-12-29 PROCEDURE — 80048 BASIC METABOLIC PNL TOTAL CA: CPT | Performed by: EMERGENCY MEDICINE

## 2024-12-29 PROCEDURE — 96374 THER/PROPH/DIAG INJ IV PUSH: CPT

## 2024-12-29 PROCEDURE — 82435 ASSAY OF BLOOD CHLORIDE: CPT | Performed by: EMERGENCY MEDICINE

## 2024-12-29 PROCEDURE — 93005 ELECTROCARDIOGRAM TRACING: CPT | Performed by: EMERGENCY MEDICINE

## 2024-12-29 PROCEDURE — 99285 EMERGENCY DEPT VISIT HI MDM: CPT | Mod: 25

## 2024-12-29 RX ORDER — ALBUTEROL SULFATE 0.83 MG/ML
2.5 SOLUTION RESPIRATORY (INHALATION) EVERY 6 HOURS PRN
Qty: 75 ML | Refills: 0 | Status: SHIPPED | OUTPATIENT
Start: 2024-12-29

## 2024-12-29 RX ORDER — ALBUTEROL SULFATE 90 UG/1
2 INHALANT RESPIRATORY (INHALATION) EVERY 6 HOURS PRN
Qty: 18 G | Refills: 0 | Status: SHIPPED | OUTPATIENT
Start: 2024-12-29

## 2024-12-29 RX ORDER — METHYLPREDNISOLONE SODIUM SUCCINATE 125 MG/2ML
125 INJECTION INTRAMUSCULAR; INTRAVENOUS ONCE
Status: COMPLETED | OUTPATIENT
Start: 2024-12-29 | End: 2024-12-29

## 2024-12-29 RX ORDER — ALBUTEROL SULFATE 0.83 MG/ML
2.5 SOLUTION RESPIRATORY (INHALATION)
Status: COMPLETED | OUTPATIENT
Start: 2024-12-29 | End: 2024-12-29

## 2024-12-29 RX ORDER — PREDNISONE 20 MG/1
TABLET ORAL
Qty: 10 TABLET | Refills: 0 | Status: SHIPPED | OUTPATIENT
Start: 2024-12-29

## 2024-12-29 RX ADMIN — ALBUTEROL SULFATE 2.5 MG: 2.5 SOLUTION RESPIRATORY (INHALATION) at 11:38

## 2024-12-29 RX ADMIN — ALBUTEROL SULFATE 2.5 MG: 2.5 SOLUTION RESPIRATORY (INHALATION) at 11:37

## 2024-12-29 RX ADMIN — METHYLPREDNISOLONE SODIUM SUCCINATE 125 MG: 125 INJECTION, POWDER, FOR SOLUTION INTRAMUSCULAR; INTRAVENOUS at 10:42

## 2024-12-29 RX ADMIN — ALBUTEROL SULFATE 2.5 MG: 2.5 SOLUTION RESPIRATORY (INHALATION) at 11:28

## 2024-12-29 ASSESSMENT — COLUMBIA-SUICIDE SEVERITY RATING SCALE - C-SSRS
2. HAVE YOU ACTUALLY HAD ANY THOUGHTS OF KILLING YOURSELF IN THE PAST MONTH?: NO
6. HAVE YOU EVER DONE ANYTHING, STARTED TO DO ANYTHING, OR PREPARED TO DO ANYTHING TO END YOUR LIFE?: NO
1. IN THE PAST MONTH, HAVE YOU WISHED YOU WERE DEAD OR WISHED YOU COULD GO TO SLEEP AND NOT WAKE UP?: NO

## 2024-12-29 ASSESSMENT — ACTIVITIES OF DAILY LIVING (ADL): ADLS_ACUITY_SCORE: 59

## 2024-12-29 NOTE — DISCHARGE INSTRUCTIONS
COVID/influenza/RSV swab negative. Chest x-ray does not show any signs of pneumonia. Blood work normal.  Symptoms are consistent with COPD/asthma exacerbation/bronchitis.  Take steroids as prescribed.  Nebulizer/inhaler as needed.

## 2024-12-29 NOTE — ED TRIAGE NOTES
Patient arrives by private car with her daughter for evaluation of chest pain that started today and increasing shortness of breath that started 3 days ago.

## 2024-12-29 NOTE — ED PROVIDER NOTES
EMERGENCY DEPARTMENT ENCOUNTER      NAME: Kulwant Amin  AGE: 54 year old female  YOB: 1970  MRN: [id number]  EVALUATION DATE & TIME: No admission date for patient encounter.    PCP: Adrien Cardoza    ED PROVIDER: Shy Pompa MD    Chief Complaint   Patient presents with    Chest Pain    Shortness of Breath         FINAL IMPRESSION:  1. COPD exacerbation (H)    2. Bronchitis          ED COURSE & MEDICAL DECISION MAKING:    Pertinent Labs & Imaging studies reviewed. (See chart for details)  54 year old female with history of HTN, CAD with previous PCI, COPD/asthma, latent TB and depression who presents to the Emergency Department for evaluation of 3 days of increasing shortness of breath, cough, wheezing and chest tightness today not responding to home albuterol.  Wheezing here.  Strongly favor asthma/COPD exacerbation, differential clues viral syndrome, influenza, COVID-19, pneumonia.  My concern for ACS, PE based on her presentation is low.    Patient initially seen evaluate by myself in triage area due to boarding crisis.  Twelve-lead EKG shows sinus rhythm.  Given 125 mg Solu-Medrol, albuterol nebs x 3.  CBC, BMP, troponin, BNP unremarkable.  COVID/influenza/RSV swab negative.  Chest x-ray negative.  Patient got some relief with nebs here and will be discharged to home with prednisone burst and refill of her nebs and inhalers for COPD/asthma exacerbation.      ED Course as of 12/29/24 1137   Sun Dec 29, 2024   1040 XR Chest 2 Views  Chest x-ray independently interpreted by myself without cardiomegaly infiltrate or effusion   1114 Troponin T, High Sensitivity: <6       Medical Decision Making  Obtained supplemental history:Supplemental history obtained?: Family Member/Significant Other  Reviewed external records: External records reviewed?: Outpatient Record: 12/27/2024 clinic visit  Care impacted by chronic illness:Chronic Lung Disease, Heart Disease, and Hypertension  Did you consider but not order  tests?: Work up considered but not performed and documented in chart, if applicable  Did you interpret images independently?: Independent interpretation of ECG and images noted in documentation, when applicable.  Consultation discussion with other provider:Did you involve another provider (consultant, , pharmacy, etc.)?: No  Discharge. I prescribed additional prescription strength medication(s) as charted. See documentation for any additional details.    MIPS: Not Applicable      At the conclusion of the encounter I discussed the results of all of the tests and the disposition. The questions were answered. The patient or family acknowledged understanding and was agreeable with the care plan.      MEDICATIONS GIVEN IN THE EMERGENCY:  Medications   albuterol (PROVENTIL) neb solution 2.5 mg (2.5 mg Nebulization $Given 12/29/24 1128)   methylPREDNISolone Na Suc (solu-MEDROL) injection 125 mg (125 mg Intravenous $Given 12/29/24 1042)       NEW PRESCRIPTIONS STARTED AT TODAY'S ER VISIT  New Prescriptions    ALBUTEROL (PROAIR HFA/PROVENTIL HFA/VENTOLIN HFA) 108 (90 BASE) MCG/ACT INHALER    Inhale 2 puffs into the lungs every 6 hours as needed for shortness of breath, wheezing or cough.    ALBUTEROL (PROVENTIL) (2.5 MG/3ML) 0.083% NEB SOLUTION    Take 1 vial (2.5 mg) by nebulization every 6 hours as needed.    PREDNISONE (DELTASONE) 20 MG TABLET    Take two tablets (= 40mg) each day for 5 (five) days          =================================================================    HPI    Patient information was obtained from: Patient and daughter    Use of Intrepreter: Pepito Amin is a 54 year old female with pertinent medical history of HTN, CAD with previous PCI, COPD/asthma, latent TB and depression who presents shortness of breath and chest tightness.  Patient states that for the last 3 days she has been having increasing shortness of breath, cough, wheezing.  Not responding to home nebulizer/inhaler.  Today  she feels tight in the chest.  Notes fever at home, though afebrile here.  Has not taken any antipyretics prior to arrival.  No recent travel, sick contacts.      PAST MEDICAL HISTORY:  Past Medical History:   Diagnosis Date    Acute asthma exacerbation 01/06/2020    Anxiety     Arthritis     Asthma exacerbation 11/19/2015    Chronic obstructive pulmonary disease, unspecified COPD type (H)     COPD (chronic obstructive pulmonary disease) (H)     Coronary artery disease due to lipid rich plaque     Depression     Diabetes (H)     Epigastric pain 12/15/2021    Essential hypertension     GERD (gastroesophageal reflux disease)     Infection due to 2019 novel coronavirus 01/03/2022    positive with COVID-19 on January 3, 2022    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    Microcytic anemia 11/17/2019    Motion sickness     PONV (postoperative nausea and vomiting)     S/P coronary artery stent placement 02/02/2018    Sleep apnea     TB lung, latent     9 mos INH       PAST SURGICAL HISTORY:  Past Surgical History:   Procedure Laterality Date    CORONARY STENT PLACEMENT  2018    CV CORONARY ANGIOGRAM N/A 1/25/2018    Procedure: Coronary Angiogram;  Surgeon: Angelo Serrano MD;  Location: [facility] Cath Lab;  Service:     CV CORONARY ANGIOGRAM N/A 5/5/2022    Procedure: Coronary Angiogram;  Surgeon: Irwin Cano MD;  Location: [facility] CATH [facility] CV    CV CORONARY ANGIOGRAM  5/5/2022    Procedure: ;  Surgeon: Irwin Cano MD;  Location: [facility] CV    ESOPHAGOSCOPY, GASTROSCOPY, DUODENOSCOPY (EGD), COMBINED N/A 11/21/2024    Procedure: ESOPHAGOGASTRODUODENOSCOPY with BIOPSY;  Surgeon: Gomez Bella MD;  Location: [facility] OR    DC ESOPHAGOGASTRODUODENOSCOPY TRANSORAL DIAGNOSTIC N/A 12/10/2018    Procedure: ESOPHAGOGASTRODUODENOSCOPY (EGD);  Surgeon: Eddie Renteria MD;  Location: [facility] GI;  Service: Gastroenterology    DC ESOPHAGOGASTRODUODENOSCOPY TRANSORAL DIAGNOSTIC N/A 12/3/2020     Procedure: ESOPHAGOGASTRODUODENOSCOPY (EGD) with biospies ;  Surgeon: Avi Crow MD;  Location: [facility];  Service: Gastroenterology    [facility] COLONOSCOPY W/WO BRUSH/WASH N/A 12/10/2018    Procedure: COLONOSCOPY with polypectomy using biopsy forceps;  Surgeon: Eddie Renteria MD;  Location: [facility];  Service: Gastroenterology       CURRENT MEDICATIONS:    Prior to Admission Medications   Prescriptions Last Dose Informant Patient Reported? Taking?   CONTOUR NEXT TEST test strip   No No   Sig: USE 1 EACH AS DIRECTED 3 (THREE) TIMES A DAY.   Continuous Glucose  (FREESTYLE MONICA 14 DAY READER) MICHAEL   No No   Sig: Use to read blood sugars as per 's instructions.   Continuous Glucose Sensor (FREESTYLE MONICA 2 SENSOR) [facility]   No No   Si EACH EVERY 14 DAYS USE 1 SENSOR EVERY 14 DAYS. USE TO READ BLOOD SUGARS PER 'S INSTRUCTIONS.   FLUoxetine (PROZAC) 20 MG capsule   No No   Sig: Take 1 capsule (20 mg) by mouth daily.   Lidocaine (LIDOCARE) 4 % Patch   No No   Sig: Place 1 patch onto the skin every 24 hours. To prevent lidocaine toxicity, patient should be patch free for 12 hrs daily.   NEXIUM 40 MG DR capsule   Yes No   Sig: Take 40 mg by mouth every morning (before breakfast).   Vitamin D3 (VITAMIN D) 10 MCG (400 UNIT) tablet   Yes No   Sig: Take by mouth daily.   acetaminophen (TYLENOL) 500 MG tablet  Daughter No No   Sig: TAKE 1 PILL BY MOUTH EVERY 6 HOURS AS NEEDED FOR PAIN   albuterol (PROAIR HFA/PROVENTIL HFA/VENTOLIN HFA) 108 (90 Base) MCG/ACT inhaler   No No   Sig: Inhale 2 puffs into the lungs every 4 hours as needed for shortness of breath   albuterol (PROVENTIL) (2.5 MG/3ML) 0.083% neb solution   No No   Sig: Take 1 vial (2.5 mg) by nebulization every 6 hours as needed for shortness of breath, wheezing or cough   amitriptyline (ELAVIL) 50 MG tablet   No No   Sig: TAKE 1 TABLET (50 MG) BY MOUTH AT BEDTIME   aspirin 81 MG EC tablet   No No   Sig: Take 1 tablet (81  mg) by mouth daily.   atorvastatin (LIPITOR) 80 MG tablet   No No   Sig: TAKE 1 TABLET (80 MG) BY MOUTH DAILY   calcium carbonate (TUMS) 500 MG chewable tablet   Yes No   Sig: Take 1 chew tab by mouth 2 times daily as needed for heartburn.   colchicine (COLCRYS) 0.6 MG tablet   No No   Sig: TAKE 1 TABLET (0.6 MG) BY MOUTH DAILY   diclofenac (VOLTAREN) 1 % topical gel   No No   Sig: Apply 4 g topically 4 times daily   ferrous sulfate (FEROSUL) 325 (65 Fe) MG tablet   No No   Sig: Take 1 tablet (325 mg) by mouth daily (with breakfast).   fluticasone-vilanterol (BREO ELLIPTA) 200-25 MCG/ACT inhaler   No No   Sig: Inhale 1 puff into the lungs daily   gabapentin (NEURONTIN) 600 MG tablet   No No   Sig: TAKE 1 PILL (600 MG) BY MOUTH 3 TIMES DAILY   galcanezumab-gnlm (EMGALITY) 120 MG/ML injection   No No   Sig: Inject 2 mLs (240 mg) subcutaneously every 28 days. Loading dose.   galcanezumab-gnlm (EMGALITY) 120 MG/ML injection   No No   Sig: Inject 1 mL (120 mg) subcutaneously every 28 days.   insulin aspart (NOVOLOG PEN) 100 UNIT/ML pen   No No   Sig: Inject 16 Units subcutaneously 2 times daily (before meals).   insulin glargine (LANTUS PEN) 100 UNIT/ML pen   No No   Sig: Inject 40 Units subcutaneously every morning.   insulin pen needle (32G X 4 MM) 32G X 4 MM miscellaneous   No No   Sig: Use 3 pen needles daily or as directed.   ipratropium - albuterol 0.5 mg/2.5 mg/3 mL (DUONEB) 0.5-2.5 (3) MG/3ML neb solution   No No   Sig: Take 1 vial (3 mLs) by nebulization 4 times daily.   lisinopril (ZESTRIL) 5 MG tablet   No No   Sig: Take 1 tablet (5 mg) by mouth daily.   meclizine (ANTIVERT) 25 MG tablet   No No   Sig: Take 1 tablet (25 mg) by mouth 3 times daily as needed for dizziness.   metFORMIN (GLUCOPHAGE XR) 500 MG 24 hr tablet   No No   Sig: Take 2 tablets (1,000 mg) by mouth 2 times daily (with meals).   metoprolol tartrate (LOPRESSOR) 25 MG tablet   No No   Sig: TAKE 1 TABLET (25 MG TOTAL) BY MOUTH 2 (TWO) TIMES A  DAY FOR BLOOD PRESSURE   montelukast (SINGULAIR) 10 MG tablet   No No   Sig: Take 1 tablet (10 mg) by mouth at bedtime   polyethylene glycol (MIRALAX) 17 GM/Dose powder   No No   Sig: Take 17 g (1 Capful) by mouth daily   polyethylene glycol (MIRALAX) 17 GM/Dose powder   No No   Sig: Take 17 g (1 Capful) by mouth daily as needed for constipation.   predniSONE (DELTASONE) 10 MG tablet   No No   Sig: Take 4 tabs daily x 3 days, THEN 3 tabs daily x 3 days, THEN 2 tabs daily x 3 days, THEN 1 tab daily x 3 days   sennosides (SENOKOT) 8.6 MG tablet   No No   Sig: Take 1 tablet by mouth daily as needed for constipation.   sucralfate (CARAFATE) 1 GM/10ML suspension   No No   Sig: Take 10 mLs (1 g) by mouth 4 times daily.   tiZANidine (ZANAFLEX) 2 MG tablet   No No   Sig: Take 1 tablet (2 mg) by mouth 3 times daily as needed for muscle spasms.   tiotropium (SPIRIVA RESPIMAT) 2.5 MCG/ACT inhaler   No No   Sig: INHALE 2 PUFFS INTO THE LUNGS DAILY   vitamin D3 (CHOLECALCIFEROL) 10 MCG (400 UNIT) capsule   No No   Sig: Take 1 capsule (400 Units) by mouth daily.      Facility-Administered Medications Last Administration Doses Remaining   tezepelumab-ekko (TEZSPIRE) injection 210 mg 3/28/2024 10:39 AM           ALLERGIES:  No Known Allergies    FAMILY HISTORY:  Family History   Problem Relation Age of Onset    Other - See Comments Mother          of an intestinal problem    Ulcers Father          of gastritis    Breast Cancer No family hx of     Ovarian Cancer No family hx of     Colon Cancer No family hx of        SOCIAL HISTORY:  Social History     Tobacco Use    Smoking status: Former     Current packs/day: 0.00     Average packs/day: 1 pack/day for 30.0 years (30.0 ttl pk-yrs)     Types: Cigarettes     Start date: 1973     Quit date: 2003     Years since quittin.0     Passive exposure: Never    Smokeless tobacco: Never    Tobacco comments:     No passive exposure   Vaping Use    Vaping status: Never Used    Substance Use Topics    Alcohol use: No    Drug use: No        VITALS:  Patient Vitals for the past 24 hrs:   BP Temp Pulse Resp SpO2 Weight   12/29/24 1125 99/78 -- 91 -- 100 % --   12/29/24 0907 115/83 97.8  F (36.6  C) 89 24 100 % 56.2 kg (124 lb)       PHYSICAL EXAM    General Appearance: Well-appearing, well-nourished, no acute distress   Chest:  No tenderness or deformity, no crepitus  Cardio:  Regular rate and rhythm, no murmur/gallop/rub  Pulm: Mildly tachypneic though no respiratory distress.  Diffuse expiratory wheezing.  Abdomen:  Soft, non-tender, non distended,no rebound or guarding.  Extremities: Normal gait, no peripheral edema  Skin:  Skin warm, dry, no rashes  Neuro:  Alert and oriented ×3     RADIOLOGY/LABS:  Reviewed all pertinent imaging. Please see official radiology report. All pertinent labs reviewed and interpreted.    Results for orders placed or performed during the hospital encounter of 12/29/24   XR Chest 2 Views    Impression    IMPRESSION: Mild right lung base atelectasis. No consolidation or pleural effusion. Normal heart size.   Basic metabolic panel   Result Value Ref Range    Sodium 138 135 - 145 mmol/L    Potassium 3.8 3.4 - 5.3 mmol/L    Chloride 103 98 - 107 mmol/L    Carbon Dioxide (CO2) 24 22 - 29 mmol/L    Anion Gap 11 7 - 15 mmol/L    Urea Nitrogen 15.7 6.0 - 20.0 mg/dL    Creatinine 0.49 (L) 0.51 - 0.95 mg/dL    GFR Estimate >90 >60 mL/min/1.73m2    Calcium 9.8 8.8 - 10.4 mg/dL    Glucose 126 (H) 70 - 99 mg/dL   Result Value Ref Range    Troponin T, High Sensitivity <6 <=14 ng/L   Nt probnp inpatient   Result Value Ref Range    N terminal Pro BNP Inpatient 56 0 - 900 pg/mL   Influenza A/B, RSV and SARS-CoV2 PCR (COVID-19) Nasopharyngeal    Specimen: Nasopharyngeal; Swab   Result Value Ref Range    Influenza A PCR Negative Negative    Influenza B PCR Negative Negative    RSV PCR Negative Negative    SARS CoV2 PCR Negative Negative   CBC with platelets and differential    Result Value Ref Range    WBC Count 8.6 4.0 - 11.0 10e3/uL    RBC Count 4.80 3.80 - 5.20 10e6/uL    Hemoglobin 13.4 11.7 - 15.7 g/dL    Hematocrit 40.2 35.0 - 47.0 %    MCV 84 78 - 100 fL    MCH 27.9 26.5 - 33.0 pg    MCHC 33.3 31.5 - 36.5 g/dL    RDW 14.6 10.0 - 15.0 %    Platelet Count 180 150 - 450 10e3/uL    % Neutrophils 58 %    % Lymphocytes 30 %    % Monocytes 7 %    % Eosinophils 5 %    % Basophils 1 %    % Immature Granulocytes 0 %    NRBCs per 100 WBC 0 <1 /100    Absolute Neutrophils 5.0 1.6 - 8.3 10e3/uL    Absolute Lymphocytes 2.6 0.8 - 5.3 10e3/uL    Absolute Monocytes 0.6 0.0 - 1.3 10e3/uL    Absolute Eosinophils 0.4 0.0 - 0.7 10e3/uL    Absolute Basophils 0.1 0.0 - 0.2 10e3/uL    Absolute Immature Granulocytes 0.0 <=0.4 10e3/uL    Absolute NRBCs 0.0 10e3/uL       EKG:  Twelve-lead EKG shows sinus rhythm.  Rate 84.  No notable ST or T wave abnormalities.  Normal intervals with QRS 86 and QTc 446.  Compared to previous from 9/26/2024 no significant interval change.  I have independently reviewed and interpreted the EKG(s) documented above.      Shy Pompa MD  Emergency Medicine  [facility] EMERGENCY DEPARTMENT  [facility] [facility] [id number] [phone]  Dept: [phone]     Shy Pompa MD  12/29/24 3602

## 2024-12-30 ENCOUNTER — PATIENT OUTREACH (OUTPATIENT)
Dept: CARE COORDINATION | Facility: CLINIC | Age: 54
End: 2024-12-30
Payer: COMMERCIAL

## 2024-12-30 NOTE — LETTER
Kulwant Amin  [address] [address]    Dear Kulwant Amin,    I am a team member within the Connected Care Resource Center with [facility]. I recently contacted you to ensure you are doing well following a visit within our health system. I also wanted to take this chance to introduce Clinic Care Coordination should you have any interest in this program in the future.     Below is a description of Clinic Care Coordination and how this team can further assist you:       The Clinic Care Coordination team is made up of a Registered Nurse, , Financial Resource Worker, and a Community Health Worker who understand and can help navigate the health care system. The goal of clinic care coordination is to help you manage your health, improve access to care, and achieve optimal health outcomes. They work alongside your provider to assist you in determining your health and social needs, obtain health care and community resources, and provide you with necessary information and education. Clinic Care Coordination can work with you through any barriers and develop a care plan that helps coordinate and strengthen the relationship between you and your care team.    If you wish to connect with the Clinic Care Coordination Team, please let your [facility] Primary Care Provider or Clinic Care Team know and they can place a referral. The Clinic Care Coordination team will then reach out by phone to further support you.    We are focused on providing you with the highest-quality healthcare experience possible.    Sincerely,   Mariel TEMPLETON  Community Health Worker    Connected Care Resources   [facility] [address] ([phone]).

## 2024-12-30 NOTE — PROGRESS NOTES
Clinic Care Coordination Contact  Community Health Worker Initial Outreach    CHW Initial Information Gathering:  Referral Source: ED Follow-Up  Current living arrangement:: Not Assessed  CHW Additional Questions  If ED/Hospital discharge, follow-up appointment scheduled as recommended?: N/A  Medication changes made following ED/Hospital discharge?: No  MyChart active?: Yes  Patient sent Social Drivers of Health questionnaire?: No    Patient accepts CC: No, Patient expressed no additional support is needed at this time. Patient will be sent Care Coordination introduction letter for future reference.     Mariel Cast  [facility] Health Worker    Connected Care Resources   [facility]     *Connected Care Resources Team does NOT   follow patient ongoing. Referrals are identified   based on internal discharge report and the outreach is to ensure   Patient has understanding of their discharge instructions.

## 2025-01-07 ENCOUNTER — TELEPHONE (OUTPATIENT)
Dept: CARDIOLOGY | Facility: CLINIC | Age: 55
End: 2025-01-07
Payer: COMMERCIAL

## 2025-01-07 LAB
ATRIAL RATE - MUSE: 84 BPM
DIASTOLIC BLOOD PRESSURE - MUSE: NORMAL MMHG
INTERPRETATION ECG - MUSE: NORMAL
P AXIS - MUSE: 31 DEGREES
PR INTERVAL - MUSE: 142 MS
QRS DURATION - MUSE: 86 MS
QT - MUSE: 378 MS
QTC - MUSE: 446 MS
R AXIS - MUSE: -32 DEGREES
SYSTOLIC BLOOD PRESSURE - MUSE: NORMAL MMHG
T AXIS - MUSE: 36 DEGREES
VENTRICULAR RATE- MUSE: 84 BPM

## 2025-01-07 NOTE — TELEPHONE ENCOUNTER
Reviewed recent elevated blood pressure reading.  Per [place] Community Measures guidelines, patients blood pressure is out of parameters and recheck blood pressure is recommended.    Last Blood Pressure: 140/84  Last Heart Rate: 80  Date: 12/27/24  Location: [facility]    Today's Blood Pressure: 115/83  Today's Heart Rate: 89  Location: Other Specialty    Patient reported blood pressure updated in Epic. Blood pressure falls within [place] Community Me[place] guidelines.  Patient will follow up as previously advised. Thanks    NATALIYA Gonzalez

## 2025-01-08 ENCOUNTER — TELEPHONE (OUTPATIENT)
Dept: FAMILY MEDICINE | Facility: CLINIC | Age: 55
End: 2025-01-08
Payer: COMMERCIAL

## 2025-01-08 NOTE — TELEPHONE ENCOUNTER
Patient Quality Outreach    Patient is due for the following:       Topic Date Due    Zoster (Shingles) Vaccine (1 of 2) Never done       Action(s) Taken:   Patient has upcoming appointment, these items will be addressed at that time.    Type of outreach:    Chart review performed, no outreach needed.    Questions for provider review:    None           Mahin Sarmiento MA  Chart routed to Care Team.

## 2025-01-13 ENCOUNTER — OFFICE VISIT (OUTPATIENT)
Dept: CARDIOLOGY | Facility: CLINIC | Age: 55
End: 2025-01-13
Attending: INTERNAL MEDICINE
Payer: COMMERCIAL

## 2025-01-13 VITALS
RESPIRATION RATE: 20 BRPM | SYSTOLIC BLOOD PRESSURE: 143 MMHG | BODY MASS INDEX: 23.79 KG/M2 | OXYGEN SATURATION: 98 % | WEIGHT: 121.8 LBS | DIASTOLIC BLOOD PRESSURE: 89 MMHG | HEART RATE: 120 BPM

## 2025-01-13 DIAGNOSIS — R07.9 CHEST PAIN, UNSPECIFIED TYPE: ICD-10-CM

## 2025-01-13 DIAGNOSIS — Z76.0 ENCOUNTER FOR MEDICATION REFILL: ICD-10-CM

## 2025-01-13 DIAGNOSIS — I47.10 SVT (SUPRAVENTRICULAR TACHYCARDIA): ICD-10-CM

## 2025-01-13 DIAGNOSIS — I10 HYPERTENSION, UNSPECIFIED TYPE: Primary | ICD-10-CM

## 2025-01-13 DIAGNOSIS — I25.83 CORONARY ARTERY DISEASE DUE TO LIPID RICH PLAQUE: Chronic | ICD-10-CM

## 2025-01-13 DIAGNOSIS — E78.5 DYSLIPIDEMIA: ICD-10-CM

## 2025-01-13 DIAGNOSIS — I25.10 CORONARY ARTERY DISEASE DUE TO LIPID RICH PLAQUE: Chronic | ICD-10-CM

## 2025-01-13 DIAGNOSIS — I25.10 CORONARY ARTERY DISEASE INVOLVING NATIVE CORONARY ARTERY OF NATIVE HEART WITHOUT ANGINA PECTORIS: ICD-10-CM

## 2025-01-13 PROCEDURE — 99214 OFFICE O/P EST MOD 30 MIN: CPT | Performed by: INTERNAL MEDICINE

## 2025-01-13 RX ORDER — ATORVASTATIN CALCIUM 80 MG/1
80 TABLET, FILM COATED ORAL DAILY
Qty: 90 TABLET | Refills: 3 | Status: SHIPPED | OUTPATIENT
Start: 2025-01-13

## 2025-01-13 RX ORDER — ASPIRIN 81 MG/1
81 TABLET ORAL DAILY
Qty: 90 TABLET | Refills: 3 | Status: SHIPPED | OUTPATIENT
Start: 2025-01-13

## 2025-01-13 RX ORDER — COLCHICINE 0.6 MG/1
0.6 TABLET ORAL DAILY
Qty: 90 TABLET | Refills: 3 | Status: SHIPPED | OUTPATIENT
Start: 2025-01-13

## 2025-01-13 NOTE — PROGRESS NOTES
[facility] [address], [place] [address]   [email or web address]   OFFICE: [phone]          Impression and Plan     1. Coronary artery disease. Kulwant has known coronary artery disease. Specifically, patient underwent coronary angiography with PCI with stent placement to proximal-mid left anterior descending coronary artery (2.75×32 and 4.0×8 mm Synergy drug-eluting stents).  ?  Kulwant underwent nuclear perfusion imaging 1 March 2018 at [facility] which revealed no evidence of infarct or ischemia.  ?  Repeat ischemic work-up involving a regadenoson MRI 24 July 2019 return favorable and revealed no evidence of ischemia (see Cardiac Diagnostic section below).     She underwent repeat coronary angiography 5 May 2022 that revealed no significant obstructive coronary disease and widely patent stent in LAD.     Regadenoson nuclear perfusion study 17 April 2023 revealed no clear evidence of ischemia.     On interview today, Kulwant reports no chest pain symptoms reminiscent of her angina.     2.  Pericarditis.  Patient had been seen by my colleague, Dr. Ja Kramer in late July 2023 and felt to have findings consistent with pericarditis.  She was started on nonsteroidal therapy/colchicine.  She had follow-up with my colleague, Dr. Bora Ennis 14 September 2023 and continued colchicine therapy was recommended for at least 3 months, if not indefinitely.  Plan:  Continue colchicine 0.6 mg daily.     3.  SVT. Patient at time of initial consultation by me 27 December 2017 had been reported to have an SVT with heart rate of approximately 200-220 bpm. Patient apparently responded to adenosine. 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 Differential, however, would include Gustavo-Parkinson-White with concealed accessory pathway (no delta waves seen on ECG) with orthodromic reciprocating  tachycardia (ORT).     She has had no subjective or objective recurrence since that time.     4. Hypertension. Blood pressure is mildly elevated in the office today though to other recent readings in December 2024 were quite favorable if not perhaps a bit on the lower side and therefore we will simply continue to monitor.  ?  5. Dyslipidemia.  Lipid profile 27 December 2024 was favorable with LDL 49 mg/dL and HDL 46 mg/dL.     6.  Severe obstructive lung disease.  Kulwant has been followed in the Pulmonary Clinic.     7.  Mild obstructive sleep apnea noted on polysomnogram 1 April 2022.     Plan on follow-up in 1 year.    35 minutes spent reviewing prior records (including documentation, laboratory studies, cardiac testing/imaging), interview with patient along with physical exam, planning, and subsequent documentation/crafting of note).           History of Present Illness    Once again I would like to thank you again for asking me to participate in the care of your patient, Kulwant Amin.  As you know, but to reiterate for my own records, Kulwant Amin is a 55 year old female with known coronary artery disease. Specifically, patient underwent coronary angiography 25 January 2018 with PCI with stent placement to proximal-mid left anterior descending coronary artery (2.75×32 and 4.0×8 mm Synergy drug-eluting stents).  ?  Kulwant underwent a nuclear perfusion study 22 January 2019 which was favorable and revealed no evidence of infarct or ischemia with normal ejection fraction of 70%.     She underwent repeat coronary angiography 5 May 2022 that revealed no significant obstructive coronary disease and widely patent stent in LAD.     On interview today, Kulwant reports some occasional chest discomfort, but not predictable with exertion and the like.  She states her breathing is comfortable.  No palpitations or lightheadedness.  She states from a cardiac standpoint she is comfortable with how she is performing.    Further  "review of systems is otherwise negative/noncontributory (medical record and 13 point review of systems reviewed as well and pertinent positives noted).         Cardiac Diagnostics      Echocardiogram 25 July 2023:  Normal left ventricular size and systolic performance with ejection fraction of 55-60%.  No significant valvular heart disease.  Right ventricle not well-visualized.  Normal left atrial size.  Right atrium not well-visualized.    Echocardiogram 5 May 2022:  Normal left ventricular size and systolic performance with ejection fraction of 55 to 60%.  \"Borderline\" anterior, septal, and apical hypokinesis.  Normal right ventricular size and systolic performance.  Normal atrial dimensions.    Echocardiogram 14 January 2021:  Normal left ventricular size and systolic performance with ejection fraction of 55 to 60%.  No significant valvular heart disease.  Normal right ventricular size and systolic performance.  Normal atrial dimensions.  When compared to prior echocardiogram dated 14 August 2020, no significant change.    Echocardiogram 14 August 2020 (personally reviewed):  Normal left ventricular size and systolic performance with a visually estimated ejection fraction of 55-60%.   No significant valvular heart disease is identified on this study.   Normal right ventricular size and systolic performance.     Echocardiogram 23 May 2019:  Normal left ventricular size and systolic performance with ejection fraction of 65 to 70%.  No significant valvular heart disease.  Normal right ventricular size and systolic performance.  Normal atrial dimensions.    Echocardiogram 25 January 2018:  Limited echocardiogram.  Normal left ventricular size and systolic performance with ejection fraction of 60%.  No pericardial effusion.    Echocardiogram 27 December 2017:  Normal left ventricular size and systolic performance with ejection fraction 65-70%.  No significant valvular heart disease.  Normal right ventricular size and " systolic performance  Normal atrial dimensions.    Echocardiogram 7 July 2014:  Normal left ventricular size and systolic performance with ejection fraction of 55-60%.  No significant valvular heart disease.  Normal right ventricular size and systolic performance.  Normal atrial dimensions.    Regadenoson nuclear perfusion study 17 April 2023:  No evidence of infarct or ischemia.  Normal left ventricular systolic performance with ejection fraction greater than 70%.  Stress to rest cavity ratio is 1.38.  TID is present quantitatively but not visually.  This is a nonspecific finding that can indicate the presence of subendocardial ischemia and clinical correlation recommended.    Regadenoson MRI 24 July 2019:  Regadenoson stress cardiac MRI is negative for inducible myocardial ischemia.   Regadenoson stress ECG is negative for inducible myocardial ischemia.  No previous myocardial infarction is identified.  Small left ventricular size, wall thickness and function. The calculated left ventricular ejection fraction is 69%.  Normal right ventricular size and function.  No obvious valvular disease.    Nuclear perfusion imaging study 22 January 2019:  No evidence of infarct or ischemia.  Normal left ventricular systolic performance with ejection fraction of 70%.    Nuclear perfusion imaging 1 March 2018:  No evidence of infarct or ischemia.  Normal left ventricular systolic performance with ejection fraction greater than 75%.    Nuclear perfusion imaging 28 December 2017 (pre-coronary angiogram):  No evidence of infarct or ischemia.  Increased stress to rest E ratio 1.46 possibly representing multivessel disease.  Normal left ventricular systolic performance with ejection fraction of 75%.    Coronary angiogram 5 May 2022:  Left main coronary artery: Normal.  Left anterior descending coronary artery: No significant obstructive disease with widely patent stent.  Circumflex coronary artery: Proximal 30% stenosis.  Right  coronary artery: Dominant vessel with 25% distal stenosis.    Coronary angiogram 25 January 2018:  Left Main coronary artery: Normal.  Left anterior descending coronary artery: Proximal-mid 70% stenosis at diagonal bifurcation with fractional flow reserve of 0.80. 25 January 2018 with  Circumflex coronary artery: Mild disease.  Right coronary artery: Normal.  Status post PCI with stent placement to proximal-mid left anterior descending coronary artery (2.75×32 and 4.0×8 mm Synergy drug-eluting stents).    Ambulatory monitor x12 days October 2022:  Essentially normal multi day patient activated monitor.  Patient's symptomatic recordings correlated with normal sinus rhythm.  No sustained atrial or ventricular tachyarrhythmia.  No profound bradycardia or significant pauses.    Holter monitor 13 August 2019:  Normal Holter monitor.    Holter monitor 22 January 2019:  Normal Holter.    30 day event recorder 20 March 2018 through 17 April 2018:  Normal 24-hour Holter monitor.  Symptoms correlating with sinus rhythm.     Twelve-lead ECG (personally reviewed) 5 February 2023: Sinus rhythm.  Incomplete right branch block.          Physical Examination       BP (!) 143/89 (BP Location: Right arm, Patient Position: Sitting, Cuff Size: Adult Regular)   Pulse 120   Resp 20   Wt 55.2 kg (121 lb 12.8 oz)   SpO2 98%   BMI 23.79 kg/m          Wt Readings from Last 3 Encounters:   01/13/25 55.2 kg (121 lb 12.8 oz)   12/29/24 56.2 kg (124 lb)   12/27/24 57 kg (125 lb 9.6 oz)       The patient is alert and oriented times three. Sclerae are anicteric. Mucosal membranes are moist. Jugular venous pressure is normal. No significant adenopathy/thyromegally appreciated. Lungs are somewhat diminished with occasional expiratory wheezes.  On cardiovascular exam, the patient has a regular S1 and S2. Abdomen is soft and non-tender. Extremities reveal no clubbing, cyanosis, or edema.           Family History/Social History/Risk Factors    Patient does not smoke.  Family history reviewed, and family history includes Other - See Comments in her mother; Ulcers in her father.          Medical History  Surgical History Family History Social History   Past Medical History:   Diagnosis Date    Acute asthma exacerbation 01/06/2020    Anxiety     Arthritis     Asthma exacerbation 11/19/2015    Chronic obstructive pulmonary disease, unspecified COPD type (H)     COPD (chronic obstructive pulmonary disease) (H)     Coronary artery disease due to lipid rich plaque     Depression     Diabetes (H)     Epigastric pain 12/15/2021    Essential hypertension     GERD (gastroesophageal reflux disease)     Infection due to 2019 novel coronavirus 01/03/2022    positive with COVID-19 on January 3, 2022    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    Microcytic anemia 11/17/2019    Motion sickness     PONV (postoperative nausea and vomiting)     S/P coronary artery stent placement 02/02/2018    Sleep apnea     TB lung, latent     9 mos INH     Past Surgical History:   Procedure Laterality Date    CORONARY STENT PLACEMENT  2018    CV CORONARY ANGIOGRAM N/A 1/25/2018    Procedure: Coronary Angiogram;  Surgeon: Angelo Serrano MD;  Location: [facility] Cath Lab;  Service:     CV CORONARY ANGIOGRAM N/A 5/5/2022    Procedure: Coronary Angiogram;  Surgeon: Irwin Cano MD;  Location: [facility] CATH [facility] CV    CV CORONARY ANGIOGRAM  5/5/2022    Procedure: ;  Surgeon: Irwin Cano MD;  Location: [facility] CV    ESOPHAGOSCOPY, GASTROSCOPY, DUODENOSCOPY (EGD), COMBINED N/A 11/21/2024    Procedure: ESOPHAGOGASTRODUODENOSCOPY with BIOPSY;  Surgeon: Gomez Bella MD;  Location: [facility] OR    SD ESOPHAGOGASTRODUODENOSCOPY TRANSORAL DIAGNOSTIC N/A 12/10/2018    Procedure: ESOPHAGOGASTRODUODENOSCOPY (EGD);  Surgeon: Eddie Renteria MD;  Location: [facility] GI;  Service: Gastroenterology    SD ESOPHAGOGASTRODUODENOSCOPY TRANSORAL DIAGNOSTIC N/A 12/3/2020     Procedure: ESOPHAGOGASTRODUODENOSCOPY (EGD) with biospies ;  Surgeon: Avi Crow MD;  Location: [facility];  Service: Gastroenterology    [facility] COLONOSCOPY W/WO BRUSH/WASH N/A 12/10/2018    Procedure: COLONOSCOPY with polypectomy using biopsy forceps;  Surgeon: Eddie Renteria MD;  Location: [facility];  Service: Gastroenterology     Family History   Problem Relation Age of Onset    Other - See Comments Mother          of an intestinal problem    Ulcers Father          of gastritis    Breast Cancer No family hx of     Ovarian Cancer No family hx of     Colon Cancer No family hx of         Social History     Socioeconomic History    Marital status:      Spouse name: Not on file    Number of children: Not on file    Years of education: Not on file    Highest education level: Not on file   Occupational History    Not on file   Tobacco Use    Smoking status: Former     Current packs/day: 0.00     Average packs/day: 1 pack/day for 30.0 years (30.0 ttl pk-yrs)     Types: Cigarettes     Start date: 1973     Quit date: 2003     Years since quittin.0     Passive exposure: Never    Smokeless tobacco: Never    Tobacco comments:     No passive exposure   Vaping Use    Vaping status: Never Used   Substance and Sexual Activity    Alcohol use: No    Drug use: No    Sexual activity: Yes     Partners: Male   Other Topics Concern    Parent/sibling w/ CABG, MI or angioplasty before 65F 55M? Not Asked   Social History Narrative    2017 The patient lives with her daughter-in-law (who is present), , son, and 2 grandchildren (total of 6 people). Immigrant.     Social Drivers of Health     Financial Resource Strain: Low Risk  (2024)    Financial Resource Strain     Within the past 12 months, have you or your family members you live with been unable to get utilities (heat, electricity) when it was really needed?: No   Food Insecurity: Low Risk  (2024)    Food Insecurity      Within the past 12 months, did you worry that your food would run out before you got money to buy more?: No     Within the past 12 months, did the food you bought just not last and you didn t have money to get more?: No   Transportation Needs: Low Risk  (9/30/2024)    Transportation Needs     Within the past 12 months, has lack of transportation kept you from medical appointments, getting your medicines, non-medical meetings or appointments, work, or from getting things that you need?: No   Physical Activity: Not on file   Stress: Not on file   Social Connections: Not on file   Interpersonal Safety: Low Risk  (11/21/2024)    Interpersonal Safety     Do you feel physically and emotionally safe where you currently live?: Yes     Within the past 12 months, have you been hit, slapped, kicked or otherwise physically hurt by someone?: No     Within the past 12 months, have you been humiliated or emotionally abused in other ways by your partner or ex-partner?: No   Recent Concern: Interpersonal Safety - High Risk (9/30/2024)    Interpersonal Safety     Do you feel physically and emotionally safe where you currently live?: No     Within the past 12 months, have you been hit, slapped, kicked or otherwise physically hurt by someone?: No     Within the past 12 months, have you been humiliated or emotionally abused in other ways by your partner or ex-partner?: No   Housing Stability: Low Risk  (9/30/2024)    Housing Stability     Do you have housing? : Yes     Are you worried about losing your housing?: No           Medications  Allergies   Current Outpatient Medications   Medication Sig Dispense Refill    acetaminophen (TYLENOL) 500 MG tablet TAKE 1 PILL BY MOUTH EVERY 6 HOURS AS NEEDED FOR PAIN 100 tablet 10    albuterol (PROAIR HFA/PROVENTIL HFA/VENTOLIN HFA) 108 (90 Base) MCG/ACT inhaler Inhale 2 puffs into the lungs every 6 hours as needed for shortness of breath, wheezing or cough. 18 g 0    albuterol (PROAIR  HFA/PROVENTIL HFA/VENTOLIN HFA) 108 (90 Base) MCG/ACT inhaler Inhale 2 puffs into the lungs every 4 hours as needed for shortness of breath 18 g 11    albuterol (PROVENTIL) (2.5 MG/3ML) 0.083% neb solution Take 1 vial (2.5 mg) by nebulization every 6 hours as needed. 75 mL 0    albuterol (PROVENTIL) (2.5 MG/3ML) 0.083% neb solution Take 1 vial (2.5 mg) by nebulization every 6 hours as needed for shortness of breath, wheezing or cough 360 mL 11    amitriptyline (ELAVIL) 50 MG tablet TAKE 1 TABLET (50 MG) BY MOUTH AT BEDTIME 90 tablet 1    aspirin 81 MG EC tablet Take 1 tablet (81 mg) by mouth daily. 90 tablet 3    atorvastatin (LIPITOR) 80 MG tablet Take 1 tablet (80 mg) by mouth daily. 90 tablet 3    calcium carbonate (TUMS) 500 MG chewable tablet Take 1 chew tab by mouth 2 times daily as needed for heartburn.      colchicine (COLCRYS) 0.6 MG tablet Take 1 tablet (0.6 mg) by mouth daily. 90 tablet 3    Continuous Glucose  (FREESTYLE MONICA 14 DAY READER) MICHAEL Use to read blood sugars as per 's instructions. 1 each 0    Continuous Glucose Sensor (FREESTYLE MONICA 2 SENSOR) MISC 1 EACH EVERY 14 DAYS USE 1 SENSOR EVERY 14 DAYS. USE TO READ BLOOD SUGARS PER 'S INSTRUCTIONS. 2 each 11    CONTOUR NEXT TEST test strip USE 1 EACH AS DIRECTED 3 (THREE) TIMES A DAY. 50 strip 11    diclofenac (VOLTAREN) 1 % topical gel Apply 4 g topically 4 times daily 350 g 3    ferrous sulfate (FEROSUL) 325 (65 Fe) MG tablet Take 1 tablet (325 mg) by mouth daily (with breakfast). 90 tablet 4    FLUoxetine (PROZAC) 20 MG capsule Take 1 capsule (20 mg) by mouth daily. 90 capsule 1    fluticasone-vilanterol (BREO ELLIPTA) 200-25 MCG/ACT inhaler Inhale 1 puff into the lungs daily 60 each 11    gabapentin (NEURONTIN) 600 MG tablet TAKE 1 PILL (600 MG) BY MOUTH 3 TIMES DAILY 90 tablet 3    galcanezumab-gnlm (EMGALITY) 120 MG/ML injection Inject 1 mL (120 mg) subcutaneously every 28 days. 1 mL 10    galcanezumab-gnlm  (EMGALITY) 120 MG/ML injection Inject 2 mLs (240 mg) subcutaneously every 28 days. Loading dose. 2 mL 0    insulin aspart (NOVOLOG PEN) 100 UNIT/ML pen Inject 16 Units subcutaneously 2 times daily (before meals). 45 mL 3    insulin glargine (LANTUS PEN) 100 UNIT/ML pen Inject 40 Units subcutaneously every morning. 45 mL 3    insulin pen needle (32G X 4 MM) 32G X 4 MM miscellaneous Use 3 pen needles daily or as directed. 300 each 4    ipratropium - albuterol 0.5 mg/2.5 mg/3 mL (DUONEB) 0.5-2.5 (3) MG/3ML neb solution Take 1 vial (3 mLs) by nebulization 4 times daily. 360 mL 11    Lidocaine (LIDOCARE) 4 % Patch Place 1 patch onto the skin every 24 hours. To prevent lidocaine toxicity, patient should be patch free for 12 hrs daily. 30 patch 1    lisinopril (ZESTRIL) 5 MG tablet Take 1 tablet (5 mg) by mouth daily. 30 tablet 3    meclizine (ANTIVERT) 25 MG tablet Take 1 tablet (25 mg) by mouth 3 times daily as needed for dizziness. 90 tablet 1    metFORMIN (GLUCOPHAGE XR) 500 MG 24 hr tablet Take 2 tablets (1,000 mg) by mouth 2 times daily (with meals). 360 tablet 3    metoprolol tartrate (LOPRESSOR) 25 MG tablet TAKE 1 TABLET (25 MG TOTAL) BY MOUTH 2 (TWO) TIMES A DAY FOR BLOOD PRESSURE 180 tablet 3    montelukast (SINGULAIR) 10 MG tablet Take 1 tablet (10 mg) by mouth at bedtime 30 tablet 11    NEXIUM 40 MG DR capsule Take 40 mg by mouth every morning (before breakfast).      polyethylene glycol (MIRALAX) 17 GM/Dose powder Take 17 g (1 Capful) by mouth daily as needed for constipation. 255 g 0    polyethylene glycol (MIRALAX) 17 GM/Dose powder Take 17 g (1 Capful) by mouth daily 238 g 1    predniSONE (DELTASONE) 10 MG tablet Take 4 tabs daily x 3 days, THEN 3 tabs daily x 3 days, THEN 2 tabs daily x 3 days, THEN 1 tab daily x 3 days 30 tablet 0    predniSONE (DELTASONE) 20 MG tablet Take two tablets (= 40mg) each day for 5 (five) days 10 tablet 0    sennosides (SENOKOT) 8.6 MG tablet Take 1 tablet by mouth daily as  needed for constipation. 30 tablet 3    sucralfate (CARAFATE) 1 GM/10ML suspension Take 10 mLs (1 g) by mouth 4 times daily. 3600 mL 1    tiotropium (SPIRIVA RESPIMAT) 2.5 MCG/ACT inhaler INHALE 2 PUFFS INTO THE LUNGS DAILY 4 g 3    tiZANidine (ZANAFLEX) 2 MG tablet Take 1 tablet (2 mg) by mouth 3 times daily as needed for muscle spasms. 270 tablet 1    vitamin D3 (CHOLECALCIFEROL) 10 MCG (400 UNIT) capsule Take 1 capsule (400 Units) by mouth daily. 90 capsule 3    Vitamin D3 (VITAMIN D) 10 MCG (400 UNIT) tablet Take by mouth daily.       No Known Allergies       Lab Results    Chemistry/lipid CBC Cardiac Enzymes/BNP/TSH/INR   Recent Labs   Lab Test 12/27/24  1325   CHOL 126   HDL 46*   LDL 49   TRIG 157*     Recent Labs   Lab Test 12/27/24  1325 05/23/24  1054 03/14/23  1140   LDL 49 38 41     Recent Labs   Lab Test 12/29/24  1043      POTASSIUM 3.8   CHLORIDE 103   CO2 24   *   BUN 15.7   CR 0.49*   GFRESTIMATED >90   FREDY 9.8     Recent Labs   Lab Test 12/29/24  1043 12/09/24  1431 10/14/24  1050   CR 0.49* 0.61 0.51     Recent Labs   Lab Test 12/27/24  1325 09/26/24  0756 06/10/24  1057   A1C 7.8* 7.3* 7.7*          Recent Labs   Lab Test 12/29/24  1043   WBC 8.6   HGB 13.4   HCT 40.2   MCV 84        Recent Labs   Lab Test 12/29/24  1043 12/09/24  1431 10/14/24  1050   HGB 13.4 13.5 12.6    Recent Labs   Lab Test 09/06/22  0934 07/11/22  2340 07/11/22 2018   TROPONINI 0.02 <0.01 <0.01     Recent Labs   Lab Test 12/29/24  1043 09/26/24  0756 07/09/24  0724 07/24/23  1644 07/11/22  2018 07/02/22  1929 01/03/22  1628   BNP  --   --   --   --  15 12 <10   NTBNPI 56 65 125   < >  --   --   --     < > = values in this interval not displayed.     Recent Labs   Lab Test 10/14/24  1050   TSH 0.82     Recent Labs   Lab Test 03/01/24  0830 09/06/22  0934 07/02/22  1929   INR 0.97 1.02 1.03          Medications  Allergies   Current Outpatient Medications   Medication Sig Dispense Refill    acetaminophen  (TYLENOL) 500 MG tablet TAKE 1 PILL BY MOUTH EVERY 6 HOURS AS NEEDED FOR PAIN 100 tablet 10    albuterol (PROAIR HFA/PROVENTIL HFA/VENTOLIN HFA) 108 (90 Base) MCG/ACT inhaler Inhale 2 puffs into the lungs every 6 hours as needed for shortness of breath, wheezing or cough. 18 g 0    albuterol (PROAIR HFA/PROVENTIL HFA/VENTOLIN HFA) 108 (90 Base) MCG/ACT inhaler Inhale 2 puffs into the lungs every 4 hours as needed for shortness of breath 18 g 11    albuterol (PROVENTIL) (2.5 MG/3ML) 0.083% neb solution Take 1 vial (2.5 mg) by nebulization every 6 hours as needed. 75 mL 0    albuterol (PROVENTIL) (2.5 MG/3ML) 0.083% neb solution Take 1 vial (2.5 mg) by nebulization every 6 hours as needed for shortness of breath, wheezing or cough 360 mL 11    amitriptyline (ELAVIL) 50 MG tablet TAKE 1 TABLET (50 MG) BY MOUTH AT BEDTIME 90 tablet 1    aspirin 81 MG EC tablet Take 1 tablet (81 mg) by mouth daily. 90 tablet 3    atorvastatin (LIPITOR) 80 MG tablet Take 1 tablet (80 mg) by mouth daily. 90 tablet 3    calcium carbonate (TUMS) 500 MG chewable tablet Take 1 chew tab by mouth 2 times daily as needed for heartburn.      colchicine (COLCRYS) 0.6 MG tablet Take 1 tablet (0.6 mg) by mouth daily. 90 tablet 3    Continuous Glucose  (FREESTYLE MONICA 14 DAY READER) MICHAEL Use to read blood sugars as per 's instructions. 1 each 0    Continuous Glucose Sensor (FREESTYLE MONICA 2 SENSOR) [facility] 1 EACH EVERY 14 DAYS USE 1 SENSOR EVERY 14 DAYS. USE TO READ BLOOD SUGARS PER 'S INSTRUCTIONS. 2 each 11    CONTOUR NEXT TEST test strip USE 1 EACH AS DIRECTED 3 (THREE) TIMES A DAY. 50 strip 11    diclofenac (VOLTAREN) 1 % topical gel Apply 4 g topically 4 times daily 350 g 3    ferrous sulfate (FEROSUL) 325 (65 Fe) MG tablet Take 1 tablet (325 mg) by mouth daily (with breakfast). 90 tablet 4    FLUoxetine (PROZAC) 20 MG capsule Take 1 capsule (20 mg) by mouth daily. 90 capsule 1    fluticasone-vilanterol (BREO  ELLIPTA) 200-25 MCG/ACT inhaler Inhale 1 puff into the lungs daily 60 each 11    gabapentin (NEURONTIN) 600 MG tablet TAKE 1 PILL (600 MG) BY MOUTH 3 TIMES DAILY 90 tablet 3    galcanezumab-gnlm (EMGALITY) 120 MG/ML injection Inject 1 mL (120 mg) subcutaneously every 28 days. 1 mL 10    galcanezumab-gnlm (EMGALITY) 120 MG/ML injection Inject 2 mLs (240 mg) subcutaneously every 28 days. Loading dose. 2 mL 0    insulin aspart (NOVOLOG PEN) 100 UNIT/ML pen Inject 16 Units subcutaneously 2 times daily (before meals). 45 mL 3    insulin glargine (LANTUS PEN) 100 UNIT/ML pen Inject 40 Units subcutaneously every morning. 45 mL 3    insulin pen needle (32G X 4 MM) 32G X 4 MM miscellaneous Use 3 pen needles daily or as directed. 300 each 4    ipratropium - albuterol 0.5 mg/2.5 mg/3 mL (DUONEB) 0.5-2.5 (3) MG/3ML neb solution Take 1 vial (3 mLs) by nebulization 4 times daily. 360 mL 11    Lidocaine (LIDOCARE) 4 % Patch Place 1 patch onto the skin every 24 hours. To prevent lidocaine toxicity, patient should be patch free for 12 hrs daily. 30 patch 1    lisinopril (ZESTRIL) 5 MG tablet Take 1 tablet (5 mg) by mouth daily. 30 tablet 3    meclizine (ANTIVERT) 25 MG tablet Take 1 tablet (25 mg) by mouth 3 times daily as needed for dizziness. 90 tablet 1    metFORMIN (GLUCOPHAGE XR) 500 MG 24 hr tablet Take 2 tablets (1,000 mg) by mouth 2 times daily (with meals). 360 tablet 3    metoprolol tartrate (LOPRESSOR) 25 MG tablet TAKE 1 TABLET (25 MG TOTAL) BY MOUTH 2 (TWO) TIMES A DAY FOR BLOOD PRESSURE 180 tablet 3    montelukast (SINGULAIR) 10 MG tablet Take 1 tablet (10 mg) by mouth at bedtime 30 tablet 11    NEXIUM 40 MG DR capsule Take 40 mg by mouth every morning (before breakfast).      polyethylene glycol (MIRALAX) 17 GM/Dose powder Take 17 g (1 Capful) by mouth daily as needed for constipation. 255 g 0    polyethylene glycol (MIRALAX) 17 GM/Dose powder Take 17 g (1 Capful) by mouth daily 238 g 1    predniSONE (DELTASONE) 10 MG  tablet Take 4 tabs daily x 3 days, THEN 3 tabs daily x 3 days, THEN 2 tabs daily x 3 days, THEN 1 tab daily x 3 days 30 tablet 0    predniSONE (DELTASONE) 20 MG tablet Take two tablets (= 40mg) each day for 5 (five) days 10 tablet 0    sennosides (SENOKOT) 8.6 MG tablet Take 1 tablet by mouth daily as needed for constipation. 30 tablet 3    sucralfate (CARAFATE) 1 GM/10ML suspension Take 10 mLs (1 g) by mouth 4 times daily. 3600 mL 1    tiotropium (SPIRIVA RESPIMAT) 2.5 MCG/ACT inhaler INHALE 2 PUFFS INTO THE LUNGS DAILY 4 g 3    tiZANidine (ZANAFLEX) 2 MG tablet Take 1 tablet (2 mg) by mouth 3 times daily as needed for muscle spasms. 270 tablet 1    vitamin D3 (CHOLECALCIFEROL) 10 MCG (400 UNIT) capsule Take 1 capsule (400 Units) by mouth daily. 90 capsule 3    Vitamin D3 (VITAMIN D) 10 MCG (400 UNIT) tablet Take by mouth daily.        No Known Allergies       Lab Results   Lab Results   Component Value Date     12/29/2024    .3 06/18/2015    CO2 24 12/29/2024    CO2 22 09/06/2022    CO2 25.1 06/18/2015    BUN 15.7 12/29/2024    BUN 12 09/06/2022    BUN 14.1 06/18/2015     Lab Results   Component Value Date    WBC 8.6 12/29/2024    WBC 8.8 01/26/2015    HGB 13.4 12/29/2024    HGB 13.7 09/11/2015    HCT 40.2 12/29/2024    HCT 41.8 09/11/2015    MCV 84 12/29/2024    MCV 80.2 09/11/2015     12/29/2024     01/26/2015     Lab Results   Component Value Date    CHOL 126 12/27/2024    CHOL 212.0 02/19/2014    TRIG 157 12/27/2024    TRIG 342.0 02/19/2014    HDL 46 12/27/2024    HDL 50.0 02/19/2014     Lab Results   Component Value Date    INR 0.97 03/01/2024     Lab Results   Component Value Date    BNP 15 07/11/2022     Lab Results   Component Value Date    TROPONINI 0.02 09/06/2022    TROPONINI <0.01 07/11/2022    TROPONINI <0.01 07/11/2022     Lab Results   Component Value Date    TSH 0.82 10/14/2024    TSH 0.18 05/06/2022    TSH 0.63 09/30/2015

## 2025-01-13 NOTE — LETTER
1/13/2025    Adrien Cardoza MD  [address] [place] [address] [address]  [place] [address]    RE: Kulwant Amin       Dear Colleague,     I had the pleasure of seeing Kulwant Amin in the [facility].         [facility]   [address], [place] [address]   [email or web address]   OFFICE: [phone]          Impression and Plan     1. Coronary artery disease. Kulwant has known coronary artery disease. Specifically, patient underwent coronary angiography with PCI with stent placement to proximal-mid left anterior descending coronary artery (2.75×32 and 4.0×8 mm Synergy drug-eluting stents).     Kulwant underwent nuclear perfusion imaging 1 March 2018 at  which revealed no evidence of infarct or ischemia.     Repeat ischemic work-up involving a regadenoson MRI 24 July 2019 return favorable and revealed no evidence of ischemia (see Cardiac Diagnostic section below).     She underwent repeat coronary angiography 5 May 2022 that revealed no significant obstructive coronary disease and widely patent stent in LAD.     Regadenoson nuclear perfusion study 17 April 2023 revealed no clear evidence of ischemia.     On interview today, Kulwant reports no chest pain symptoms reminiscent of her angina.     2.  Pericarditis.  Patient had been seen by my colleague, Dr. Ja Kramer in late July 2023 and felt to have findings consistent with pericarditis.  She was started on nonsteroidal therapy/colchicine.  She had follow-up with my colleague, Dr. Bora Ennis 14 September 2023 and continued colchicine therapy was recommended for at least 3 months, if not indefinitely.  Plan:  Continue colchicine 0.6 mg daily.     3.  SVT. Patient at time of initial consultation by me 27 December 2017 had been reported to have an SVT with heart rate of approximately 200-220 bpm. Patient apparently responded to adenosine. 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 Differential, however, would include Gustavo-Parkinson-White with concealed accessory pathway (no delta waves seen on ECG) with orthodromic reciprocating tachycardia (ORT).     She has had no subjective or objective recurrence since that time.     4. Hypertension. Blood pressure is mildly elevated in the office today though to other recent readings in December 2024 were quite favorable if not perhaps a bit on the lower side and therefore we will simply continue to monitor.     5. Dyslipidemia.  Lipid profile 27 December 2024 was favorable with LDL 49 mg/dL and HDL 46 mg/dL.     6.  Severe obstructive lung disease.  Kulwant has been followed in the Pulmonary Clinic.     7.  Mild obstructive sleep apnea noted on polysomnogram 1 April 2022.     Plan on follow-up in 1 year.    35 minutes spent reviewing prior records (including documentation, laboratory studies, cardiac testing/imaging), interview with patient along with physical exam, planning, and subsequent documentation/crafting of note).           History of Present Illness    Once again I would like to thank you again for asking me to participate in the care of your patient, Kulwant Amin.  As you know, but to reiterate for my own records, Kulwant Amin is a 55 year old female with known coronary artery disease. Specifically, patient underwent coronary angiography 25 January 2018 with PCI with stent placement to proximal-mid left anterior descending coronary artery (2.75×32 and 4.0×8 mm Synergy drug-eluting stents).     Kulwant underwent a nuclear perfusion study 22 January 2019 which was favorable and revealed no evidence of infarct or ischemia with normal ejection fraction of 70%.     She underwent repeat coronary angiography 5 May 2022 that revealed no significant obstructive coronary disease and widely patent stent in LAD.     On interview today, Kulwant reports some occasional chest discomfort, but not predictable with exertion and  "the like.  She states her breathing is comfortable.  No palpitations or lightheadedness.  She states from a cardiac standpoint she is comfortable with how she is performing.    Further review of systems is otherwise negative/noncontributory (medical record and 13 point review of systems reviewed as well and pertinent positives noted).         Cardiac Diagnostics      Echocardiogram 25 July 2023:  Normal left ventricular size and systolic performance with ejection fraction of 55-60%.  No significant valvular heart disease.  Right ventricle not well-visualized.  Normal left atrial size.  Right atrium not well-visualized.    Echocardiogram 5 May 2022:  Normal left ventricular size and systolic performance with ejection fraction of 55 to 60%.  \"Borderline\" anterior, septal, and apical hypokinesis.  Normal right ventricular size and systolic performance.  Normal atrial dimensions.    Echocardiogram 14 January 2021:  Normal left ventricular size and systolic performance with ejection fraction of 55 to 60%.  No significant valvular heart disease.  Normal right ventricular size and systolic performance.  Normal atrial dimensions.  When compared to prior echocardiogram dated 14 August 2020, no significant change.    Echocardiogram 14 August 2020 (personally reviewed):  Normal left ventricular size and systolic performance with a visually estimated ejection fraction of 55-60%.   No significant valvular heart disease is identified on this study.   Normal right ventricular size and systolic performance.     Echocardiogram 23 May 2019:  Normal left ventricular size and systolic performance with ejection fraction of 65 to 70%.  No significant valvular heart disease.  Normal right ventricular size and systolic performance.  Normal atrial dimensions.    Echocardiogram 25 January 2018:  Limited echocardiogram.  Normal left ventricular size and systolic performance with ejection fraction of 60%.  No pericardial " effusion.    Echocardiogram 27 December 2017:  Normal left ventricular size and systolic performance with ejection fraction 65-70%.  No significant valvular heart disease.  Normal right ventricular size and systolic performance  Normal atrial dimensions.    Echocardiogram 7 July 2014:  Normal left ventricular size and systolic performance with ejection fraction of 55-60%.  No significant valvular heart disease.  Normal right ventricular size and systolic performance.  Normal atrial dimensions.    Regadenoson nuclear perfusion study 17 April 2023:  No evidence of infarct or ischemia.  Normal left ventricular systolic performance with ejection fraction greater than 70%.  Stress to rest cavity ratio is 1.38.  TID is present quantitatively but not visually.  This is a nonspecific finding that can indicate the presence of subendocardial ischemia and clinical correlation recommended.    Regadenoson MRI 24 July 2019:  Regadenoson stress cardiac MRI is negative for inducible myocardial ischemia.   Regadenoson stress ECG is negative for inducible myocardial ischemia.  No previous myocardial infarction is identified.  Small left ventricular size, wall thickness and function. The calculated left ventricular ejection fraction is 69%.  Normal right ventricular size and function.  No obvious valvular disease.    Nuclear perfusion imaging study 22 January 2019:  No evidence of infarct or ischemia.  Normal left ventricular systolic performance with ejection fraction of 70%.    Nuclear perfusion imaging 1 March 2018:  No evidence of infarct or ischemia.  Normal left ventricular systolic performance with ejection fraction greater than 75%.    Nuclear perfusion imaging 28 December 2017 (pre-coronary angiogram):  No evidence of infarct or ischemia.  Increased stress to rest E ratio 1.46 possibly representing multivessel disease.  Normal left ventricular systolic performance with ejection fraction of 75%.    Coronary angiogram 5 May  2022:  Left main coronary artery: Normal.  Left anterior descending coronary artery: No significant obstructive disease with widely patent stent.  Circumflex coronary artery: Proximal 30% stenosis.  Right coronary artery: Dominant vessel with 25% distal stenosis.    Coronary angiogram 25 January 2018:  Left Main coronary artery: Normal.  Left anterior descending coronary artery: Proximal-mid 70% stenosis at diagonal bifurcation with fractional flow reserve of 0.80. 25 January 2018 with  Circumflex coronary artery: Mild disease.  Right coronary artery: Normal.  Status post PCI with stent placement to proximal-mid left anterior descending coronary artery (2.75×32 and 4.0×8 mm Synergy drug-eluting stents).    Ambulatory monitor x12 days October 2022:  Essentially normal multi day patient activated monitor.  Patient's symptomatic recordings correlated with normal sinus rhythm.  No sustained atrial or ventricular tachyarrhythmia.  No profound bradycardia or significant pauses.    Holter monitor 13 August 2019:  Normal Holter monitor.    Holter monitor 22 January 2019:  Normal Holter.    30 day event recorder 20 March 2018 through 17 April 2018:  Normal 24-hour Holter monitor.  Symptoms correlating with sinus rhythm.     Twelve-lead ECG (personally reviewed) 5 February 2023: Sinus rhythm.  Incomplete right branch block.          Physical Examination       BP (!) 143/89 (BP Location: Right arm, Patient Position: Sitting, Cuff Size: Adult Regular)   Pulse 120   Resp 20   Wt 55.2 kg (121 lb 12.8 oz)   SpO2 98%   BMI 23.79 kg/m          Wt Readings from Last 3 Encounters:   01/13/25 55.2 kg (121 lb 12.8 oz)   12/29/24 56.2 kg (124 lb)   12/27/24 57 kg (125 lb 9.6 oz)       The patient is alert and oriented times three. Sclerae are anicteric. Mucosal membranes are moist. Jugular venous pressure is normal. No significant adenopathy/thyromegally appreciated. Lungs are somewhat diminished with occasional expiratory wheezes.   On cardiovascular exam, the patient has a regular S1 and S2. Abdomen is soft and non-tender. Extremities reveal no clubbing, cyanosis, or edema.           Family History/Social History/Risk Factors   Patient does not smoke.  Family history reviewed, and family history includes Other - See Comments in her mother; Ulcers in her father.          Medical History  Surgical History Family History Social History   Past Medical History:   Diagnosis Date     Acute asthma exacerbation 01/06/2020     Anxiety      Arthritis      Asthma exacerbation 11/19/2015     Chronic obstructive pulmonary disease, unspecified COPD type (H)      COPD (chronic obstructive pulmonary disease) (H)      Coronary artery disease due to lipid rich plaque      Depression      Diabetes (H)      Epigastric pain 12/15/2021     Essential hypertension      GERD (gastroesophageal reflux disease)      Infection due to 2019 novel coronavirus 01/03/2022    positive with COVID-19 on January 3, 2022     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     Microcytic anemia 11/17/2019     Motion sickness      PONV (postoperative nausea and vomiting)      S/P coronary artery stent placement 02/02/2018     Sleep apnea      TB lung, latent     9 mos INH     Past Surgical History:   Procedure Laterality Date     CORONARY STENT PLACEMENT  2018     CV CORONARY ANGIOGRAM N/A 1/25/2018    Procedure: Coronary Angiogram;  Surgeon: Angelo Serrano MD;  Location: [facility];  Service:      CV CORONARY ANGIOGRAM N/A 5/5/2022    Procedure: Coronary Angiogram;  Surgeon: Irwin Cano MD;  Location: [facility] CATH [facility] CV     CV CORONARY ANGIOGRAM  5/5/2022    Procedure: ;  Surgeon: Irwin Cano MD;  Location: [facility] CV     ESOPHAGOSCOPY, GASTROSCOPY, DUODENOSCOPY (EGD), COMBINED N/A 11/21/2024    Procedure: ESOPHAGOGASTRODUODENOSCOPY with BIOPSY;  Surgeon: Gomez Bella MD;  Location: [facility] OR     IL ESOPHAGOGASTRODUODENOSCOPY TRANSORAL  DIAGNOSTIC N/A 12/10/2018    Procedure: ESOPHAGOGASTRODUODENOSCOPY (EGD);  Surgeon: Eddie Renteria MD;  Location: [facility];  Service: Gastroenterology     NE ESOPHAGOGASTRODUODENOSCOPY TRANSORAL DIAGNOSTIC N/A 12/3/2020    Procedure: ESOPHAGOGASTRODUODENOSCOPY (EGD) with biospies ;  Surgeon: Avi Crow MD;  Location: [facility];  Service: Gastroenterology     ZZ COLONOSCOPY W/WO BRUSH/WASH N/A 12/10/2018    Procedure: COLONOSCOPY with polypectomy using biopsy forceps;  Surgeon: Eddie Renteria MD;  Location: [facility];  Service: Gastroenterology     Family History   Problem Relation Age of Onset     Other - See Comments Mother          of an intestinal problem     Ulcers Father          of gastritis     Breast Cancer No family hx of      Ovarian Cancer No family hx of      Colon Cancer No family hx of         Social History     Socioeconomic History     Marital status:      Spouse name: Not on file     Number of children: Not on file     Years of education: Not on file     Highest education level: Not on file   Occupational History     Not on file   Tobacco Use     Smoking status: Former     Current packs/day: 0.00     Average packs/day: 1 pack/day for 30.0 years (30.0 ttl pk-yrs)     Types: Cigarettes     Start date: 1973     Quit date: 2003     Years since quittin.0     Passive exposure: Never     Smokeless tobacco: Never     Tobacco comments:     No passive exposure   Vaping Use     Vaping status: Never Used   Substance and Sexual Activity     Alcohol use: No     Drug use: No     Sexual activity: Yes     Partners: Male   Other Topics Concern     Parent/sibling w/ CABG, MI or angioplasty before 65F 55M? Not Asked   Social History Narrative    2017 The patient lives with her daughter-in-law (who is present), , son, and 2 grandchildren (total of 6 people). Immigrant.     Social Drivers of Health     Financial Resource Strain: Low Risk  (2024)     Financial Resource Strain      Within the past 12 months, have you or your family members you live with been unable to get utilities (heat, electricity) when it was really needed?: No   Food Insecurity: Low Risk  (9/30/2024)    Food Insecurity      Within the past 12 months, did you worry that your food would run out before you got money to buy more?: No      Within the past 12 months, did the food you bought just not last and you didn t have money to get more?: No   Transportation Needs: Low Risk  (9/30/2024)    Transportation Needs      Within the past 12 months, has lack of transportation kept you from medical appointments, getting your medicines, non-medical meetings or appointments, work, or from getting things that you need?: No   Physical Activity: Not on file   Stress: Not on file   Social Connections: Not on file   Interpersonal Safety: Low Risk  (11/21/2024)    Interpersonal Safety      Do you feel physically and emotionally safe where you currently live?: Yes      Within the past 12 months, have you been hit, slapped, kicked or otherwise physically hurt by someone?: No      Within the past 12 months, have you been humiliated or emotionally abused in other ways by your partner or ex-partner?: No   Recent Concern: Interpersonal Safety - High Risk (9/30/2024)    Interpersonal Safety      Do you feel physically and emotionally safe where you currently live?: No      Within the past 12 months, have you been hit, slapped, kicked or otherwise physically hurt by someone?: No      Within the past 12 months, have you been humiliated or emotionally abused in other ways by your partner or ex-partner?: No   Housing Stability: Low Risk  (9/30/2024)    Housing Stability      Do you have housing? : Yes      Are you worried about losing your housing?: No           Medications  Allergies   Current Outpatient Medications   Medication Sig Dispense Refill     acetaminophen (TYLENOL) 500 MG tablet TAKE 1 PILL BY MOUTH EVERY 6  HOURS AS NEEDED FOR PAIN 100 tablet 10     albuterol (PROAIR HFA/PROVENTIL HFA/VENTOLIN HFA) 108 (90 Base) MCG/ACT inhaler Inhale 2 puffs into the lungs every 6 hours as needed for shortness of breath, wheezing or cough. 18 g 0     albuterol (PROAIR HFA/PROVENTIL HFA/VENTOLIN HFA) 108 (90 Base) MCG/ACT inhaler Inhale 2 puffs into the lungs every 4 hours as needed for shortness of breath 18 g 11     albuterol (PROVENTIL) (2.5 MG/3ML) 0.083% neb solution Take 1 vial (2.5 mg) by nebulization every 6 hours as needed. 75 mL 0     albuterol (PROVENTIL) (2.5 MG/3ML) 0.083% neb solution Take 1 vial (2.5 mg) by nebulization every 6 hours as needed for shortness of breath, wheezing or cough 360 mL 11     amitriptyline (ELAVIL) 50 MG tablet TAKE 1 TABLET (50 MG) BY MOUTH AT BEDTIME 90 tablet 1     aspirin 81 MG EC tablet Take 1 tablet (81 mg) by mouth daily. 90 tablet 3     atorvastatin (LIPITOR) 80 MG tablet Take 1 tablet (80 mg) by mouth daily. 90 tablet 3     calcium carbonate (TUMS) 500 MG chewable tablet Take 1 chew tab by mouth 2 times daily as needed for heartburn.       colchicine (COLCRYS) 0.6 MG tablet Take 1 tablet (0.6 mg) by mouth daily. 90 tablet 3     Continuous Glucose  (FREESTYLE MONICA 14 DAY READER) MICHAEL Use to read blood sugars as per 's instructions. 1 each 0     Continuous Glucose Sensor (FREESTYLE MONICA 2 SENSOR) [facility] 1 EACH EVERY 14 DAYS USE 1 SENSOR EVERY 14 DAYS. USE TO READ BLOOD SUGARS PER 'S INSTRUCTIONS. 2 each 11     CONTOUR NEXT TEST test strip USE 1 EACH AS DIRECTED 3 (THREE) TIMES A DAY. 50 strip 11     diclofenac (VOLTAREN) 1 % topical gel Apply 4 g topically 4 times daily 350 g 3     ferrous sulfate (FEROSUL) 325 (65 Fe) MG tablet Take 1 tablet (325 mg) by mouth daily (with breakfast). 90 tablet 4     FLUoxetine (PROZAC) 20 MG capsule Take 1 capsule (20 mg) by mouth daily. 90 capsule 1     fluticasone-vilanterol (BREO ELLIPTA) 200-25 MCG/ACT inhaler Inhale 1  puff into the lungs daily 60 each 11     gabapentin (NEURONTIN) 600 MG tablet TAKE 1 PILL (600 MG) BY MOUTH 3 TIMES DAILY 90 tablet 3     galcanezumab-gnlm (EMGALITY) 120 MG/ML injection Inject 1 mL (120 mg) subcutaneously every 28 days. 1 mL 10     galcanezumab-gnlm (EMGALITY) 120 MG/ML injection Inject 2 mLs (240 mg) subcutaneously every 28 days. Loading dose. 2 mL 0     insulin aspart (NOVOLOG PEN) 100 UNIT/ML pen Inject 16 Units subcutaneously 2 times daily (before meals). 45 mL 3     insulin glargine (LANTUS PEN) 100 UNIT/ML pen Inject 40 Units subcutaneously every morning. 45 mL 3     insulin pen needle (32G X 4 MM) 32G X 4 MM miscellaneous Use 3 pen needles daily or as directed. 300 each 4     ipratropium - albuterol 0.5 mg/2.5 mg/3 mL (DUONEB) 0.5-2.5 (3) MG/3ML neb solution Take 1 vial (3 mLs) by nebulization 4 times daily. 360 mL 11     Lidocaine (LIDOCARE) 4 % Patch Place 1 patch onto the skin every 24 hours. To prevent lidocaine toxicity, patient should be patch free for 12 hrs daily. 30 patch 1     lisinopril (ZESTRIL) 5 MG tablet Take 1 tablet (5 mg) by mouth daily. 30 tablet 3     meclizine (ANTIVERT) 25 MG tablet Take 1 tablet (25 mg) by mouth 3 times daily as needed for dizziness. 90 tablet 1     metFORMIN (GLUCOPHAGE XR) 500 MG 24 hr tablet Take 2 tablets (1,000 mg) by mouth 2 times daily (with meals). 360 tablet 3     metoprolol tartrate (LOPRESSOR) 25 MG tablet TAKE 1 TABLET (25 MG TOTAL) BY MOUTH 2 (TWO) TIMES A DAY FOR BLOOD PRESSURE 180 tablet 3     montelukast (SINGULAIR) 10 MG tablet Take 1 tablet (10 mg) by mouth at bedtime 30 tablet 11     NEXIUM 40 MG DR capsule Take 40 mg by mouth every morning (before breakfast).       polyethylene glycol (MIRALAX) 17 GM/Dose powder Take 17 g (1 Capful) by mouth daily as needed for constipation. 255 g 0     polyethylene glycol (MIRALAX) 17 GM/Dose powder Take 17 g (1 Capful) by mouth daily 238 g 1     predniSONE (DELTASONE) 10 MG tablet Take 4 tabs  daily x 3 days, THEN 3 tabs daily x 3 days, THEN 2 tabs daily x 3 days, THEN 1 tab daily x 3 days 30 tablet 0     predniSONE (DELTASONE) 20 MG tablet Take two tablets (= 40mg) each day for 5 (five) days 10 tablet 0     sennosides (SENOKOT) 8.6 MG tablet Take 1 tablet by mouth daily as needed for constipation. 30 tablet 3     sucralfate (CARAFATE) 1 GM/10ML suspension Take 10 mLs (1 g) by mouth 4 times daily. 3600 mL 1     tiotropium (SPIRIVA RESPIMAT) 2.5 MCG/ACT inhaler INHALE 2 PUFFS INTO THE LUNGS DAILY 4 g 3     tiZANidine (ZANAFLEX) 2 MG tablet Take 1 tablet (2 mg) by mouth 3 times daily as needed for muscle spasms. 270 tablet 1     vitamin D3 (CHOLECALCIFEROL) 10 MCG (400 UNIT) capsule Take 1 capsule (400 Units) by mouth daily. 90 capsule 3     Vitamin D3 (VITAMIN D) 10 MCG (400 UNIT) tablet Take by mouth daily.       No Known Allergies       Lab Results    Chemistry/lipid CBC Cardiac Enzymes/BNP/TSH/INR   Recent Labs   Lab Test 12/27/24  1325   CHOL 126   HDL 46*   LDL 49   TRIG 157*     Recent Labs   Lab Test 12/27/24  1325 05/23/24  1054 03/14/23  1140   LDL 49 38 41     Recent Labs   Lab Test 12/29/24  1043      POTASSIUM 3.8   CHLORIDE 103   CO2 24   *   BUN 15.7   CR 0.49*   GFRESTIMATED >90   FREDY 9.8     Recent Labs   Lab Test 12/29/24  1043 12/09/24  1431 10/14/24  1050   CR 0.49* 0.61 0.51     Recent Labs   Lab Test 12/27/24  1325 09/26/24  0756 06/10/24  1057   A1C 7.8* 7.3* 7.7*          Recent Labs   Lab Test 12/29/24  1043   WBC 8.6   HGB 13.4   HCT 40.2   MCV 84        Recent Labs   Lab Test 12/29/24  1043 12/09/24  1431 10/14/24  1050   HGB 13.4 13.5 12.6    Recent Labs   Lab Test 09/06/22  0934 07/11/22  2340 07/11/22  2018   TROPONINI 0.02 <0.01 <0.01     Recent Labs   Lab Test 12/29/24  1043 09/26/24  0756 07/09/24  0724 07/24/23  1644 07/11/22 2018 07/02/22  1929 01/03/22  1628   BNP  --   --   --   --  15 12 <10   NTBNPI 56 65 125   < >  --   --   --     < > = values in  this interval not displayed.     Recent Labs   Lab Test 10/14/24  1050   TSH 0.82     Recent Labs   Lab Test 03/01/24  0830 09/06/22  0934 07/02/22  1929   INR 0.97 1.02 1.03          Medications  Allergies   Current Outpatient Medications   Medication Sig Dispense Refill     acetaminophen (TYLENOL) 500 MG tablet TAKE 1 PILL BY MOUTH EVERY 6 HOURS AS NEEDED FOR PAIN 100 tablet 10     albuterol (PROAIR HFA/PROVENTIL HFA/VENTOLIN HFA) 108 (90 Base) MCG/ACT inhaler Inhale 2 puffs into the lungs every 6 hours as needed for shortness of breath, wheezing or cough. 18 g 0     albuterol (PROAIR HFA/PROVENTIL HFA/VENTOLIN HFA) 108 (90 Base) MCG/ACT inhaler Inhale 2 puffs into the lungs every 4 hours as needed for shortness of breath 18 g 11     albuterol (PROVENTIL) (2.5 MG/3ML) 0.083% neb solution Take 1 vial (2.5 mg) by nebulization every 6 hours as needed. 75 mL 0     albuterol (PROVENTIL) (2.5 MG/3ML) 0.083% neb solution Take 1 vial (2.5 mg) by nebulization every 6 hours as needed for shortness of breath, wheezing or cough 360 mL 11     amitriptyline (ELAVIL) 50 MG tablet TAKE 1 TABLET (50 MG) BY MOUTH AT BEDTIME 90 tablet 1     aspirin 81 MG EC tablet Take 1 tablet (81 mg) by mouth daily. 90 tablet 3     atorvastatin (LIPITOR) 80 MG tablet Take 1 tablet (80 mg) by mouth daily. 90 tablet 3     calcium carbonate (TUMS) 500 MG chewable tablet Take 1 chew tab by mouth 2 times daily as needed for heartburn.       colchicine (COLCRYS) 0.6 MG tablet Take 1 tablet (0.6 mg) by mouth daily. 90 tablet 3     Continuous Glucose  (FREESTYLE MONICA 14 DAY READER) MICHAEL Use to read blood sugars as per 's instructions. 1 each 0     Continuous Glucose Sensor (FREESTYLE MONICA 2 SENSOR) MISC 1 EACH EVERY 14 DAYS USE 1 SENSOR EVERY 14 DAYS. USE TO READ BLOOD SUGARS PER 'S INSTRUCTIONS. 2 each 11     CONTOUR NEXT TEST test strip USE 1 EACH AS DIRECTED 3 (THREE) TIMES A DAY. 50 strip 11     diclofenac (VOLTAREN)  1 % topical gel Apply 4 g topically 4 times daily 350 g 3     ferrous sulfate (FEROSUL) 325 (65 Fe) MG tablet Take 1 tablet (325 mg) by mouth daily (with breakfast). 90 tablet 4     FLUoxetine (PROZAC) 20 MG capsule Take 1 capsule (20 mg) by mouth daily. 90 capsule 1     fluticasone-vilanterol (BREO ELLIPTA) 200-25 MCG/ACT inhaler Inhale 1 puff into the lungs daily 60 each 11     gabapentin (NEURONTIN) 600 MG tablet TAKE 1 PILL (600 MG) BY MOUTH 3 TIMES DAILY 90 tablet 3     galcanezumab-gnlm (EMGALITY) 120 MG/ML injection Inject 1 mL (120 mg) subcutaneously every 28 days. 1 mL 10     galcanezumab-gnlm (EMGALITY) 120 MG/ML injection Inject 2 mLs (240 mg) subcutaneously every 28 days. Loading dose. 2 mL 0     insulin aspart (NOVOLOG PEN) 100 UNIT/ML pen Inject 16 Units subcutaneously 2 times daily (before meals). 45 mL 3     insulin glargine (LANTUS PEN) 100 UNIT/ML pen Inject 40 Units subcutaneously every morning. 45 mL 3     insulin pen needle (32G X 4 MM) 32G X 4 MM miscellaneous Use 3 pen needles daily or as directed. 300 each 4     ipratropium - albuterol 0.5 mg/2.5 mg/3 mL (DUONEB) 0.5-2.5 (3) MG/3ML neb solution Take 1 vial (3 mLs) by nebulization 4 times daily. 360 mL 11     Lidocaine (LIDOCARE) 4 % Patch Place 1 patch onto the skin every 24 hours. To prevent lidocaine toxicity, patient should be patch free for 12 hrs daily. 30 patch 1     lisinopril (ZESTRIL) 5 MG tablet Take 1 tablet (5 mg) by mouth daily. 30 tablet 3     meclizine (ANTIVERT) 25 MG tablet Take 1 tablet (25 mg) by mouth 3 times daily as needed for dizziness. 90 tablet 1     metFORMIN (GLUCOPHAGE XR) 500 MG 24 hr tablet Take 2 tablets (1,000 mg) by mouth 2 times daily (with meals). 360 tablet 3     metoprolol tartrate (LOPRESSOR) 25 MG tablet TAKE 1 TABLET (25 MG TOTAL) BY MOUTH 2 (TWO) TIMES A DAY FOR BLOOD PRESSURE 180 tablet 3     montelukast (SINGULAIR) 10 MG tablet Take 1 tablet (10 mg) by mouth at bedtime 30 tablet 11     NEXIUM 40 MG  DR capsule Take 40 mg by mouth every morning (before breakfast).       polyethylene glycol (MIRALAX) 17 GM/Dose powder Take 17 g (1 Capful) by mouth daily as needed for constipation. 255 g 0     polyethylene glycol (MIRALAX) 17 GM/Dose powder Take 17 g (1 Capful) by mouth daily 238 g 1     predniSONE (DELTASONE) 10 MG tablet Take 4 tabs daily x 3 days, THEN 3 tabs daily x 3 days, THEN 2 tabs daily x 3 days, THEN 1 tab daily x 3 days 30 tablet 0     predniSONE (DELTASONE) 20 MG tablet Take two tablets (= 40mg) each day for 5 (five) days 10 tablet 0     sennosides (SENOKOT) 8.6 MG tablet Take 1 tablet by mouth daily as needed for constipation. 30 tablet 3     sucralfate (CARAFATE) 1 GM/10ML suspension Take 10 mLs (1 g) by mouth 4 times daily. 3600 mL 1     tiotropium (SPIRIVA RESPIMAT) 2.5 MCG/ACT inhaler INHALE 2 PUFFS INTO THE LUNGS DAILY 4 g 3     tiZANidine (ZANAFLEX) 2 MG tablet Take 1 tablet (2 mg) by mouth 3 times daily as needed for muscle spasms. 270 tablet 1     vitamin D3 (CHOLECALCIFEROL) 10 MCG (400 UNIT) capsule Take 1 capsule (400 Units) by mouth daily. 90 capsule 3     Vitamin D3 (VITAMIN D) 10 MCG (400 UNIT) tablet Take by mouth daily.        No Known Allergies       Lab Results   Lab Results   Component Value Date     12/29/2024    .3 06/18/2015    CO2 24 12/29/2024    CO2 22 09/06/2022    CO2 25.1 06/18/2015    BUN 15.7 12/29/2024    BUN 12 09/06/2022    BUN 14.1 06/18/2015     Lab Results   Component Value Date    WBC 8.6 12/29/2024    WBC 8.8 01/26/2015    HGB 13.4 12/29/2024    HGB 13.7 09/11/2015    HCT 40.2 12/29/2024    HCT 41.8 09/11/2015    MCV 84 12/29/2024    MCV 80.2 09/11/2015     12/29/2024     01/26/2015     Lab Results   Component Value Date    CHOL 126 12/27/2024    CHOL 212.0 02/19/2014    TRIG 157 12/27/2024    TRIG 342.0 02/19/2014    HDL 46 12/27/2024    HDL 50.0 02/19/2014     Lab Results   Component Value Date    INR 0.97 03/01/2024     Lab Results    Component Value Date    BNP 15 07/11/2022     Lab Results   Component Value Date    TROPONINI 0.02 09/06/2022    TROPONINI <0.01 07/11/2022    TROPONINI <0.01 07/11/2022     Lab Results   Component Value Date    TSH 0.82 10/14/2024    TSH 0.18 05/06/2022    TSH 0.63 09/30/2015                      Thank you for allowing me to participate in the care of your patient.      Sincerely,     Del Hirsch MD     [facility]  cc:   Del Hirsch MD  [address]  [place] [address]

## 2025-01-14 ENCOUNTER — APPOINTMENT (OUTPATIENT)
Dept: RADIOLOGY | Facility: HOSPITAL | Age: 55
End: 2025-01-14
Payer: COMMERCIAL

## 2025-01-14 ENCOUNTER — HOSPITAL ENCOUNTER (EMERGENCY)
Facility: HOSPITAL | Age: 55
Discharge: HOME | End: 2025-01-14
Attending: STUDENT IN AN ORGANIZED HEALTH CARE EDUCATION/TRAINING PROGRAM
Payer: COMMERCIAL

## 2025-01-14 VITALS
BODY MASS INDEX: 23.79 KG/M2 | OXYGEN SATURATION: 92 % | WEIGHT: 121.8 LBS | RESPIRATION RATE: 20 BRPM | TEMPERATURE: 99.1 F | SYSTOLIC BLOOD PRESSURE: 113 MMHG | DIASTOLIC BLOOD PRESSURE: 65 MMHG | HEART RATE: 96 BPM

## 2025-01-14 DIAGNOSIS — J10.1 INFLUENZA A: ICD-10-CM

## 2025-01-14 PROBLEM — R12 HEARTBURN: Status: ACTIVE | Noted: 2025-01-14

## 2025-01-14 LAB
ANION GAP SERPL CALCULATED.3IONS-SCNC: 15 MMOL/L (ref 7–15)
BASOPHILS # BLD AUTO: 0 10E3/UL (ref 0–0.2)
BASOPHILS NFR BLD AUTO: 0 %
BUN SERPL-MCNC: 7.5 MG/DL (ref 6–20)
CALCIUM SERPL-MCNC: 9.3 MG/DL (ref 8.8–10.4)
CHLORIDE SERPL-SCNC: 99 MMOL/L (ref 98–107)
CREAT SERPL-MCNC: 0.54 MG/DL (ref 0.51–0.95)
EGFRCR SERPLBLD CKD-EPI 2021: >90 ML/MIN/1.73M2
EOSINOPHIL # BLD AUTO: 0 10E3/UL (ref 0–0.7)
EOSINOPHIL NFR BLD AUTO: 1 %
ERYTHROCYTE [DISTWIDTH] IN BLOOD BY AUTOMATED COUNT: 14.9 % (ref 10–15)
FLUAV RNA SPEC QL NAA+PROBE: POSITIVE
FLUBV RNA RESP QL NAA+PROBE: NEGATIVE
GLUCOSE SERPL-MCNC: 307 MG/DL (ref 70–99)
HCO3 SERPL-SCNC: 21 MMOL/L (ref 22–29)
HCT VFR BLD AUTO: 38.6 % (ref 35–47)
HGB BLD-MCNC: 12.8 G/DL (ref 11.7–15.7)
IMM GRANULOCYTES # BLD: 0 10E3/UL
IMM GRANULOCYTES NFR BLD: 0 %
LACTATE SERPL-SCNC: 2 MMOL/L (ref 0.7–2)
LACTATE SERPL-SCNC: 2.1 MMOL/L (ref 0.7–2)
LYMPHOCYTES # BLD AUTO: 1 10E3/UL (ref 0.8–5.3)
LYMPHOCYTES NFR BLD AUTO: 21 %
MCH RBC QN AUTO: 27.8 PG (ref 26.5–33)
MCHC RBC AUTO-ENTMCNC: 33.2 G/DL (ref 31.5–36.5)
MCV RBC AUTO: 84 FL (ref 78–100)
MONOCYTES # BLD AUTO: 0.4 10E3/UL (ref 0–1.3)
MONOCYTES NFR BLD AUTO: 9 %
NEUTROPHILS # BLD AUTO: 3.1 10E3/UL (ref 1.6–8.3)
NEUTROPHILS NFR BLD AUTO: 68 %
NRBC # BLD AUTO: 0 10E3/UL
NRBC BLD AUTO-RTO: 0 /100
PLATELET # BLD AUTO: 93 10E3/UL (ref 150–450)
POTASSIUM SERPL-SCNC: 3.8 MMOL/L (ref 3.4–5.3)
PROCALCITONIN SERPL IA-MCNC: 0.12 NG/ML
RBC # BLD AUTO: 4.6 10E6/UL (ref 3.8–5.2)
RSV RNA SPEC NAA+PROBE: NEGATIVE
SARS-COV-2 RNA RESP QL NAA+PROBE: NEGATIVE
SODIUM SERPL-SCNC: 135 MMOL/L (ref 135–145)
WBC # BLD AUTO: 4.6 10E3/UL (ref 4–11)

## 2025-01-14 PROCEDURE — 250N000009 HC RX 250

## 2025-01-14 PROCEDURE — 71046 X-RAY EXAM CHEST 2 VIEWS: CPT

## 2025-01-14 PROCEDURE — 87637 SARSCOV2&INF A&B&RSV AMP PRB: CPT | Performed by: STUDENT IN AN ORGANIZED HEALTH CARE EDUCATION/TRAINING PROGRAM

## 2025-01-14 PROCEDURE — 83605 ASSAY OF LACTIC ACID: CPT

## 2025-01-14 PROCEDURE — 87040 BLOOD CULTURE FOR BACTERIA: CPT

## 2025-01-14 PROCEDURE — 94640 AIRWAY INHALATION TREATMENT: CPT

## 2025-01-14 PROCEDURE — 80048 BASIC METABOLIC PNL TOTAL CA: CPT

## 2025-01-14 PROCEDURE — 99284 EMERGENCY DEPT VISIT MOD MDM: CPT | Mod: 25

## 2025-01-14 PROCEDURE — 999N000157 HC STATISTIC RCP TIME EA 10 MIN

## 2025-01-14 PROCEDURE — 96374 THER/PROPH/DIAG INJ IV PUSH: CPT

## 2025-01-14 PROCEDURE — 96375 TX/PRO/DX INJ NEW DRUG ADDON: CPT

## 2025-01-14 PROCEDURE — 85004 AUTOMATED DIFF WBC COUNT: CPT

## 2025-01-14 PROCEDURE — 250N000011 HC RX IP 250 OP 636: Performed by: STUDENT IN AN ORGANIZED HEALTH CARE EDUCATION/TRAINING PROGRAM

## 2025-01-14 PROCEDURE — 36415 COLL VENOUS BLD VENIPUNCTURE: CPT

## 2025-01-14 PROCEDURE — 84145 PROCALCITONIN (PCT): CPT

## 2025-01-14 PROCEDURE — 82374 ASSAY BLOOD CARBON DIOXIDE: CPT

## 2025-01-14 PROCEDURE — 250N000011 HC RX IP 250 OP 636

## 2025-01-14 PROCEDURE — 96361 HYDRATE IV INFUSION ADD-ON: CPT

## 2025-01-14 PROCEDURE — 258N000003 HC RX IP 258 OP 636

## 2025-01-14 PROCEDURE — 250N000013 HC RX MED GY IP 250 OP 250 PS 637

## 2025-01-14 RX ORDER — OSELTAMIVIR PHOSPHATE 75 MG/1
75 CAPSULE ORAL DAILY
Qty: 5 CAPSULE | Refills: 0 | Status: SHIPPED | OUTPATIENT
Start: 2025-01-14 | End: 2025-01-19

## 2025-01-14 RX ORDER — ONDANSETRON 4 MG/1
4 TABLET, ORALLY DISINTEGRATING ORAL EVERY 8 HOURS PRN
Qty: 10 TABLET | Refills: 0 | Status: SHIPPED | OUTPATIENT
Start: 2025-01-14 | End: 2025-01-17

## 2025-01-14 RX ORDER — ACETAMINOPHEN 325 MG/1
650 TABLET ORAL ONCE
Status: COMPLETED | OUTPATIENT
Start: 2025-01-14 | End: 2025-01-14

## 2025-01-14 RX ORDER — OSELTAMIVIR PHOSPHATE 75 MG/1
75 CAPSULE ORAL ONCE
Status: COMPLETED | OUTPATIENT
Start: 2025-01-14 | End: 2025-01-14

## 2025-01-14 RX ORDER — IPRATROPIUM BROMIDE AND ALBUTEROL SULFATE 2.5; .5 MG/3ML; MG/3ML
3 SOLUTION RESPIRATORY (INHALATION) ONCE
Status: COMPLETED | OUTPATIENT
Start: 2025-01-14 | End: 2025-01-14

## 2025-01-14 RX ORDER — ONDANSETRON 2 MG/ML
4 INJECTION INTRAMUSCULAR; INTRAVENOUS EVERY 30 MIN PRN
Status: DISCONTINUED | OUTPATIENT
Start: 2025-01-14 | End: 2025-01-14 | Stop reason: HOSPADM

## 2025-01-14 RX ORDER — METHYLPREDNISOLONE SODIUM SUCCINATE 125 MG/2ML
125 INJECTION INTRAMUSCULAR; INTRAVENOUS ONCE
Status: COMPLETED | OUTPATIENT
Start: 2025-01-14 | End: 2025-01-14

## 2025-01-14 RX ADMIN — SODIUM CHLORIDE 1000 ML: 9 INJECTION, SOLUTION INTRAVENOUS at 16:08

## 2025-01-14 RX ADMIN — METHYLPREDNISOLONE SODIUM SUCCINATE 125 MG: 125 INJECTION, POWDER, FOR SOLUTION INTRAMUSCULAR; INTRAVENOUS at 13:40

## 2025-01-14 RX ADMIN — OSELTAMAVIR PHOSPHATE 75 MG: 75 CAPSULE ORAL at 13:40

## 2025-01-14 RX ADMIN — ONDANSETRON 4 MG: 2 INJECTION INTRAMUSCULAR; INTRAVENOUS at 12:48

## 2025-01-14 RX ADMIN — IPRATROPIUM BROMIDE AND ALBUTEROL SULFATE 3 ML: .5; 3 SOLUTION RESPIRATORY (INHALATION) at 13:15

## 2025-01-14 RX ADMIN — SODIUM CHLORIDE 1000 ML: 9 INJECTION, SOLUTION INTRAVENOUS at 12:47

## 2025-01-14 RX ADMIN — ACETAMINOPHEN 650 MG: 325 TABLET ORAL at 12:47

## 2025-01-14 ASSESSMENT — ACTIVITIES OF DAILY LIVING (ADL)
ADLS_ACUITY_SCORE: 59

## 2025-01-14 NOTE — ED PROVIDER NOTES
Emergency Department Midlevel Supervisory Note     I had a face to face encounter with this patient seen by the Advanced Practice Provider (JANNA). I personally made/approved the management plan and take responsibility for the patient management. I personally saw patient and performed a substantive portion of the visit including all aspects of the medical decision making.     ED Course:  12:42 PM Cortney Werner PA-C staffed patient with me. I agree with their assessment and plan of management, and I will see the patient.  1:20 PM I met with the patient to introduce myself, gather additional history, perform my initial exam, and discuss the plan.     Brief HPI:     Kulwant Amin is a 55 year old female who presents for evaluation of flu-like symptoms.  Symptoms present for about 3 to 4 days.  Dry cough as well as subjective fevers at home and bodyaches.  Family has noticed increased wheezing.    I, Markos Ferro, am serving as a scribe to document services personally performed by Ryan Otto MD, based on my observations and the provider's statements to me.   I, Ryan Otto MD, attest that Markos Ferro was acting in a scribe capacity, has observed my performance of the services and has documented them in accordance with my direction.    Brief Physical Exam: /63   Pulse 99   Temp 99.1  F (37.3  C) (Oral)   Resp 20   Wt 55.2 kg (121 lb 12.8 oz)   SpO2 94%   BMI 23.79 kg/m    Constitutional:  Alert, in no acute distress  EYES: Conjunctivae clear  HENT:  Atraumatic  Respiratory:   Diffuse expiratory wheezes, no rales  Cardiovascular:   Tachycardic, regular rhythm, warm and well-perfused extremities  GI: Soft, non-distended, non-tender  Musculoskeletal:  Moves all 4 extremities equally, grossly symmetrical strength  Integument: Warm & dry. No appreciable rash, erythema.  Neurologic:  Alert & oriented, speech clear and fluent, no focal deficits noted         MDM:    Patient is a 55-year-old Serbian speaking  female with history of COPD, FLACO, type 2 diabetes, CAD who presents to the emergency department for evaluation of shortness of breath, cough and flulike symptoms that been present for about 4 days.  Notes increasing shortness of breath for the past 3 to 4 days along with a dry cough.  Has been having bodyaches and subjective fevers as well.  Daughter notes that she has been wheezing more than usual as well.    Upon initial evaluation here patient is tachycardic and has diffuse expiratory wheezes, saturating well on room air.  Warm and well-perfused extremities, abdomen is soft and benign.    Suspect probable viral URI or pneumonia leading to aCOPD exacerbation.  Will send off viral testing along with blood work and a chest x-ray to evaluate for concurrent pneumonia.    Labs reviewed and interpreted myself.  CBC shows no leukocytosis or significant anemia.  BMP is reassuring.  Influenza A is positive and suspect this is the precipitant cause of her symptoms.  Pro-Arron negative and chest x-ray do not see any obvious focal consolidation to suggest pneumonia.  Initial lactate is slightly elevated 2.1 we will repeat this after 1 L fluid bolus.    Tachycardia does seem to be improving with fluid resuscitation.  At the end of my shift final disposition is pending repeat lactate.  If lactate cleared  and patient remains saturating well on room air suspect she will be safe for discharge home but would send her home  finished a course of Tamiflu as she has an increased risk of progression to severe disease.       No diagnosis found.        Labs and Imaging:  Results for orders placed or performed during the hospital encounter of 01/14/25   XR Chest 2 Views    Impression    IMPRESSION: Ovoid, pleural calcification noted in the right midlung again. Lungs are otherwise clear. No signs of pneumonia or failure. Heart and pulmonary vascularity are normal.   Influenza A/B, RSV and SARS-CoV2 PCR (COVID-19) Nasopharyngeal    Specimen:  Nasopharyngeal; Swab   Result Value Ref Range    Influenza A PCR Positive (A) Negative    Influenza B PCR Negative Negative    RSV PCR Negative Negative    SARS CoV2 PCR Negative Negative   Basic metabolic panel   Result Value Ref Range    Sodium 135 135 - 145 mmol/L    Potassium 3.8 3.4 - 5.3 mmol/L    Chloride 99 98 - 107 mmol/L    Carbon Dioxide (CO2) 21 (L) 22 - 29 mmol/L    Anion Gap 15 7 - 15 mmol/L    Urea Nitrogen 7.5 6.0 - 20.0 mg/dL    Creatinine 0.54 0.51 - 0.95 mg/dL    GFR Estimate >90 >60 mL/min/1.73m2    Calcium 9.3 8.8 - 10.4 mg/dL    Glucose 307 (H) 70 - 99 mg/dL   Result Value Ref Range    Procalcitonin 0.12 <0.50 ng/mL   Lactic acid whole blood with 1x repeat in 2 hr when >2   Result Value Ref Range    Lactic Acid, Initial 2.1 (H) 0.7 - 2.0 mmol/L   CBC with platelets and differential   Result Value Ref Range    WBC Count 4.6 4.0 - 11.0 10e3/uL    RBC Count 4.60 3.80 - 5.20 10e6/uL    Hemoglobin 12.8 11.7 - 15.7 g/dL    Hematocrit 38.6 35.0 - 47.0 %    MCV 84 78 - 100 fL    MCH 27.8 26.5 - 33.0 pg    MCHC 33.2 31.5 - 36.5 g/dL    RDW 14.9 10.0 - 15.0 %    Platelet Count 93 (L) 150 - 450 10e3/uL    % Neutrophils 68 %    % Lymphocytes 21 %    % Monocytes 9 %    % Eosinophils 1 %    % Basophils 0 %    % Immature Granulocytes 0 %    NRBCs per 100 WBC 0 <1 /100    Absolute Neutrophils 3.1 1.6 - 8.3 10e3/uL    Absolute Lymphocytes 1.0 0.8 - 5.3 10e3/uL    Absolute Monocytes 0.4 0.0 - 1.3 10e3/uL    Absolute Eosinophils 0.0 0.0 - 0.7 10e3/uL    Absolute Basophils 0.0 0.0 - 0.2 10e3/uL    Absolute Immature Granulocytes 0.0 <=0.4 10e3/uL    Absolute NRBCs 0.0 10e3/uL       I have reviewed the relevant laboratory studies above.    I independently interpreted the following imaging study(s):       EKG: I reviewed and independently interpreted the patient's EKG, with comments made as listed below. Please see scanned EKG for full report.       Procedures:  I was present for the key portions of procedures  documented in JANNA/midlevel note, see midlevel note for further details.    Ryan Otto MD  [facility] EMERGENCY DEPARTMENT  [facility] [facility] [id number] [phone]       Ryan Otto MD  01/14/25 9577

## 2025-01-14 NOTE — ED PROVIDER NOTES
Emergency Department Encounter   NAME: Kulwant Amin  AGE: 55 year old female   YOB: 1970 ;   MRN: [id number] ;    ED PROVIDER: Cortney Werner PA-C    PCP: Adrien Cardoza    Evaluation Date & Time:   1/14/25 1116    CHIEF COMPLAINT:  Flu Symptoms and Dizziness      FINAL IMPRESSION:  No diagnosis found.      IMPRESSION AND PLAN   MDM: Kulwant Amin is a 55 year old female with a pertinent history of *** who presents to the ED by *** for evaluation of ***.    Vitals ***. On exam ***.     Upon reevaluation, ***.    Medical Decision Making  Obtained supplemental history:Supplemental history obtained?: Documented in chart and Family Member/Significant Other  Reviewed external records: External records reviewed?: No  Care impacted by chronic illness:Chronic Lung Disease, Diabetes, Heart Disease, and Hypertension  Care significantly affected by social determinants of health:N/A  Did you consider but not order tests?: Work up considered but not performed and documented in chart, if applicable  Did you interpret images independently?: Independent interpretation of ECG and images noted in documentation, when applicable.  Consultation discussion with other provider:Did you involve another provider (consultant, , pharmacy, etc.)?: I discussed the care with another health care provider, see documentation for details.  {ADMIT VS D/C:977216}    {[facility] DOCUMENTATION:786825}       ED COURSE:  12:15 PM I met and introduced myself to the patient. I gathered initial history and performed my physical exam. We discussed plan for initial workup.   12:46 PM I have staffed the patient with Dr. Otto, ED MD, who has evaluated the patient and agrees with all aspects of today's care.   5:28 PM I rechecked the patient and discussed results, discharge, follow up, and reasons to return to the ED.           MEDICATIONS GIVEN IN THE EMERGENCY DEPARTMENT:  Medications   sodium chloride 0.9% BOLUS 1,000 mL (has no administration in  time range)   ondansetron (ZOFRAN) injection 4 mg (has no administration in time range)   ipratropium - albuterol 0.5 mg/2.5 mg/3 mL (DUONEB) neb solution 3 mL (has no administration in time range)         NEW PRESCRIPTIONS STARTED AT TODAY'S ED VISIT:  New Prescriptions    No medications on file         BRIEF HPI   Patient information was obtained from: patient, patient's daughter   Use of Intrepreter: Yes (Phone) Language - Yoruba    Kulwant Amin is a 55 year old female with a pertinent history of hypertension, coronary artery disease, s/p coronary stent placement, asthma, COPD, latent lung TB, and diabetes mellitus type II who presents to the ED by private car for evaluation of shortness of breath and dizziness.    Patient endorses 3-4 days of headache, shortness of breath, dizziness like the room is spinning and she may fall down, and cough. Everybody at home was sick with similar symptoms but have since recovered. She also vomited 2-3 times yesterday and has lower extremity pain, intermittent abdominal pain, and decreased oral intake secondary to her illness. She has tried tylenol and last had tylenol this morning. Patient does have a history of COPD and has been taking her rescue inhaler 4-5 times per day.     Per patient's daughter: patient uses her rescue inhaler 2-3 times per day when she is not sick. She has a history of heart problems and her heart rate increases when she is sick. Patient had a coronary stent placed in 2018 at [facility]. She had a stroke with associated vertigo 2-3 years ago, and now patient is on baby aspirin. She has had persistent left upper extremity and lower extremity weakness, and has used a wheelchair to ambulate at baseline since the stroke.      REVIEW OF SYSTEMS:  Pertinent positive and negative symptoms per HPI.       MEDICAL HISTORY     Past Medical History:   Diagnosis Date    Acute asthma exacerbation 01/06/2020    Anxiety     Arthritis     Asthma exacerbation  11/19/2015    Chronic obstructive pulmonary disease, unspecified COPD type (H)     COPD (chronic obstructive pulmonary disease) (H)     Coronary artery disease due to lipid rich plaque     Depression     Diabetes (H)     Epigastric pain 12/15/2021    Essential hypertension     GERD (gastroesophageal reflux disease)     Infection due to 2019 novel coronavirus 01/03/2022    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    Microcytic anemia 11/17/2019    Motion sickness     PONV (postoperative nausea and vomiting)     S/P coronary artery stent placement 02/02/2018    Sleep apnea     TB lung, latent        Past Surgical History:   Procedure Laterality Date    CORONARY STENT PLACEMENT  2018    CV CORONARY ANGIOGRAM N/A 1/25/2018    Procedure: Coronary Angiogram;  Surgeon: Angelo Serrano MD;  Location: [facility] Cath Lab;  Service:     CV CORONARY ANGIOGRAM N/A 5/5/2022    Procedure: Coronary Angiogram;  Surgeon: Irwin Cano MD;  Location: [facility] CATH [facility] CV    CV CORONARY ANGIOGRAM  5/5/2022    Procedure: ;  Surgeon: Irwin Cano MD;  Location: [facility] LAB CV    ESOPHAGOSCOPY, GASTROSCOPY, DUODENOSCOPY (EGD), COMBINED N/A 11/21/2024    Procedure: ESOPHAGOGASTRODUODENOSCOPY with BIOPSY;  Surgeon: Gomez Bella MD;  Location: [facility] OR    OK ESOPHAGOGASTRODUODENOSCOPY TRANSORAL DIAGNOSTIC N/A 12/10/2018    Procedure: ESOPHAGOGASTRODUODENOSCOPY (EGD);  Surgeon: Eddie Renteria MD;  Location: [facility];  Service: Gastroenterology    OK ESOPHAGOGASTRODUODENOSCOPY TRANSORAL DIAGNOSTIC N/A 12/3/2020    Procedure: ESOPHAGOGASTRODUODENOSCOPY (EGD) with biospies ;  Surgeon: Avi Crow MD;  Location: [facility] GI;  Service: Gastroenterology    [facility] COLONOSCOPY W/WO BRUSH/WASH N/A 12/10/2018    Procedure: COLONOSCOPY with polypectomy using biopsy forceps;  Surgeon: Eddie Renteria MD;  Location: [facility];  Service: Gastroenterology       Family History   Problem Relation Age of Onset     Other - See Comments Mother          of an intestinal problem    Ulcers Father          of gastritis    Breast Cancer No family hx of     Ovarian Cancer No family hx of     Colon Cancer No family hx of        Social History     Tobacco Use    Smoking status: Former     Current packs/day: 0.00     Average packs/day: 1 pack/day for 30.0 years (30.0 ttl pk-yrs)     Types: Cigarettes     Start date: 1973     Quit date: 2003     Years since quittin.0     Passive exposure: Never    Smokeless tobacco: Never    Tobacco comments:     No passive exposure   Vaping Use    Vaping status: Never Used   Substance Use Topics    Alcohol use: No    Drug use: No         PHYSICAL EXAM     First Vitals:  Patient Vitals for the past 24 hrs:   BP Temp Temp src Pulse Resp SpO2 Weight   25 1133 121/60 -- -- -- -- -- --   25 1127 -- 99.1  F (37.3  C) Oral (!) 128 20 95 % 55.2 kg (121 lb 12.8 oz)       PHYSICAL EXAM:  Physical Exam  Vitals and nursing note reviewed.   Constitutional:       Appearance: She is normal weight. She is ill-appearing.   HENT:      Head: Normocephalic and atraumatic.      Right Ear: Tympanic membrane, ear canal and external ear normal.      Left Ear: Tympanic membrane, ear canal and external ear normal.      Nose: Congestion and rhinorrhea present.      Mouth/Throat:      Mouth: Mucous membranes are dry.      Pharynx: Oropharynx is clear. No oropharyngeal exudate or posterior oropharyngeal erythema.   Eyes:      General: Lids are normal.         Right eye: No discharge.         Left eye: No discharge.      Extraocular Movements: Extraocular movements intact.      Right eye: No nystagmus.      Left eye: No nystagmus.      Conjunctiva/sclera: Conjunctivae normal.      Right eye: Right conjunctiva is not injected.      Left eye: Left conjunctiva is not injected.      Pupils: Pupils are equal, round, and reactive to light.   Cardiovascular:      Rate and Rhythm: Regular rhythm. Tachycardia  present.      Heart sounds: Normal heart sounds.   Pulmonary:      Effort: No respiratory distress.      Breath sounds: Examination of the right-upper field reveals wheezing and rhonchi. Examination of the left-upper field reveals wheezing and rhonchi. Examination of the right-middle field reveals wheezing and rhonchi. Examination of the right-lower field reveals wheezing and rhonchi. Examination of the left-lower field reveals wheezing and rhonchi. Wheezing and rhonchi present.      Comments: Diffuse expiratory wheezing and rhonchi.  Musculoskeletal:      Cervical back: Normal range of motion and neck supple.   Lymphadenopathy:      Cervical: No cervical adenopathy.   Skin:     General: Skin is warm and dry.   Neurological:      General: No focal deficit present.      Mental Status: She is oriented to person, place, and time.      Cranial Nerves: No cranial nerve deficit.      Sensory: No sensory deficit.      Motor: Weakness present.      Comments: Weakness of L side at baseline due to CVA.   Psychiatric:         Mood and Affect: Mood normal.        RESULTS     LAB:  All pertinent labs reviewed and interpreted  Labs Ordered and Resulted from Time of ED Arrival to Time of ED Departure   INFLUENZA A/B, RSV AND SARS-COV2 PCR - Abnormal       Result Value    Influenza A PCR Positive (*)     Influenza B PCR Negative      RSV PCR Negative      SARS CoV2 PCR Negative         RADIOLOGY:  XR Chest 2 Views    (Results Pending)         I, Triny Pacheco, am serving as a scribe to document services personally performed by Cortney Werner PA-C, based on my observation and the provider's statements to me. ICortney PA-C attest that Triny Pacheco is acting in a scribe capacity, has observed my performance of the services and has documented them in accordance with my direction.       Cortney Werner PA-C  Emergency Medicine   [facility] EMERGENCY DEPARTMENT

## 2025-01-14 NOTE — ED TRIAGE NOTES
Pt to ED via w/c with family from home with complaint of flu like symptoms and dizziness. Symptoms present 4 days per pt. Pt states frequent cough, fevers, body aches, dizziness. Has been taking tylenol at home for fevers and pain. Tachycardic in triage rates 120's. Family reports hearing more wheezing from the pt with breathing. Pt reporting some increased SOB. Sats 94-97% in triage on RA.    Hx COPD     Triage Assessment (Adult)       Row Name 01/14/25 1130          Triage Assessment    Airway WDL WDL        Respiratory WDL    Respiratory WDL X;cough     Cough Frequency frequent        Skin Circulation/Temperature WDL    Skin Circulation/Temperature WDL WDL        Cardiac WDL    Cardiac WDL WDL        Peripheral/Neurovascular WDL    Peripheral Neurovascular WDL WDL        Cognitive/Neuro/Behavioral WDL    Cognitive/Neuro/Behavioral WDL WDL

## 2025-01-14 NOTE — DISCHARGE INSTRUCTIONS
You did test positive for influenza A which I believed to be the cause of your symptoms.  I think you are history of COPD is worsening your symptoms.  Continue to use your inhalers and nebulizers at home to improve your breathing.  Please continue to use Tylenol and ibuprofen for fevers and bodyaches.  Also very important that you take plenty of oral fluids including water and Pedialyte to stay well-hydrated.  Please return to the emergency department for any new or worsening symptoms.    Tap?'?nl? inphlu'?nj? A k? l?gi sak?r?tmaka par?k?a?a garnubhay? leslie mail? tap?'??k? lak?a?ahar?k? k?ra?a h? bhan?ra vi?v?sa gar?.  Mal?'? l?gcha ki tap?'im? COPD k? itih?sa hunuhuncha tap?'im?k? lak?a?ahar? bigram?daicha.  ?phn? s?sa ph?rna sudh?ra garna gharam? ?phn? inah?edward ra n?bul?'ijarahar? pray?ga garna j?r? r?khnuh?s.  Kr?pay? jvar? ra ?ar?ra dukh?'ik? l?gi Tylenol ra ibuprofen pray?ga garna j?r? r?khnuh?s.  R?mr?kali?ga h?'i?r????a rahanak? l?gi tap?'?nl? p?n? ra Pedialyte lag?yata dh?cheema maukhika tarala pad?rthahar? linu ashish dh?cheema mahattvap?r?a aruna.  Kunai nay?m? v? bigram?roscoe ga'?k? lak?a?ahar?k? l?gi kr?pay? ?patak?l?na vibh?madisyn? pharkanuh?s.

## 2025-01-15 ENCOUNTER — PATIENT OUTREACH (OUTPATIENT)
Dept: CARE COORDINATION | Facility: CLINIC | Age: 55
End: 2025-01-15
Payer: COMMERCIAL

## 2025-01-15 NOTE — LETTER
Kulwant Amin  [address] [address]    Dear Kulwant Amin,      I am a team member within the [facility] with [facility]. I recently spoke to you/Billy to ensure you were doing well following a recent visit within our health system. I also wanted to take this chance to introduce Clinic Care Coordination.     Below is a description of Clinic Care Coordination and how this team can further assist you:       The Clinic Care Coordination team is made up of a Registered Nurse, , Financial Resource Worker, and a Community Health Worker who understand and can help navigate the health care system. The goal of clinic care coordination is to help you manage your health, improve access to care, and achieve optimal health outcomes. They work alongside your provider to assist you in determining your health and social needs, obtain health care and community resources, and provide you with necessary information and education. Clinic Care Coordination can work with you through any barriers and develop a care plan that helps coordinate and strengthen the relationship between you and your care team.    If you wish to connect with the Clinic Care Coordination Team, please let your [facility] Primary Care Provider or Clinic Care Team know and they can place a referral. The Clinic Care Coordination team will then reach out by phone to further support you.    We are focused on providing you with the highest-quality healthcare experience possible.    Sincerely,   Your care team with [facility]'s [address] [place] [address] ([phone]).    Ellen Reece, [facility]  Care Coordination

## 2025-01-15 NOTE — PROGRESS NOTES
Clinic Care Coordination Contact  Community Health Worker Initial Outreach    CHW Initial Information Gathering:  Referral Source: ED Follow-Up  Current living arrangement:: Not Assessed  CHW Additional Questions  If ED/Hospital discharge, follow-up appointment scheduled as recommended?: Yes  Medication changes made following ED/Hospital discharge?: No  MyChart active?: Yes  Patient sent Social Drivers of Health questionnaire?: No    Patient accepts CC: No, declined. No additional support is needed at this time. Patient will be sent Care Coordination introduction letter for future reference.     [facility]  Care Coordination

## 2025-01-16 DIAGNOSIS — Z53.9 DIAGNOSIS NOT YET DEFINED: Primary | ICD-10-CM

## 2025-01-16 LAB
BACTERIA BLD CULT: NORMAL
BACTERIA BLD CULT: NORMAL

## 2025-01-16 PROCEDURE — G0179 MD RECERTIFICATION HHA PT: HCPCS | Performed by: FAMILY MEDICINE

## 2025-01-17 ENCOUNTER — TELEPHONE (OUTPATIENT)
Dept: FAMILY MEDICINE | Facility: CLINIC | Age: 55
End: 2025-01-17

## 2025-01-17 ENCOUNTER — ANCILLARY PROCEDURE (OUTPATIENT)
Dept: GENERAL RADIOLOGY | Facility: CLINIC | Age: 55
End: 2025-01-17
Attending: FAMILY MEDICINE
Payer: COMMERCIAL

## 2025-01-17 DIAGNOSIS — R05.9 COUGH, UNSPECIFIED TYPE: ICD-10-CM

## 2025-01-17 PROCEDURE — 71046 X-RAY EXAM CHEST 2 VIEWS: CPT | Mod: TC | Performed by: RADIOLOGY

## 2025-01-17 NOTE — TELEPHONE ENCOUNTER
----- Message from Adrien Cardoza sent at 1/17/2025 12:02 PM CST -----  CXR did not show pneumonia. She does not need additional medication. Annika call the patient.    Dr. Adrien Cardoza  1/17/2025 12:02 PM

## 2025-01-19 ENCOUNTER — APPOINTMENT (OUTPATIENT)
Dept: RADIOLOGY | Facility: HOSPITAL | Age: 55
End: 2025-01-19
Attending: EMERGENCY MEDICINE
Payer: COMMERCIAL

## 2025-01-19 ENCOUNTER — HOSPITAL ENCOUNTER (EMERGENCY)
Facility: HOSPITAL | Age: 55
Discharge: HOME | End: 2025-01-19
Attending: EMERGENCY MEDICINE | Admitting: EMERGENCY MEDICINE
Payer: COMMERCIAL

## 2025-01-19 VITALS
OXYGEN SATURATION: 98 % | HEART RATE: 90 BPM | BODY MASS INDEX: 23.16 KG/M2 | RESPIRATION RATE: 20 BRPM | WEIGHT: 118 LBS | DIASTOLIC BLOOD PRESSURE: 69 MMHG | SYSTOLIC BLOOD PRESSURE: 107 MMHG | TEMPERATURE: 97.9 F | HEIGHT: 60 IN

## 2025-01-19 DIAGNOSIS — J45.21 MILD INTERMITTENT ASTHMA WITH EXACERBATION: ICD-10-CM

## 2025-01-19 DIAGNOSIS — N39.0 URINARY TRACT INFECTION WITH HEMATURIA, SITE UNSPECIFIED: ICD-10-CM

## 2025-01-19 DIAGNOSIS — R31.9 URINARY TRACT INFECTION WITH HEMATURIA, SITE UNSPECIFIED: ICD-10-CM

## 2025-01-19 LAB
ALBUMIN SERPL BCG-MCNC: 3.5 G/DL (ref 3.5–5.2)
ALBUMIN UR-MCNC: 100 MG/DL
ALP SERPL-CCNC: 91 U/L (ref 40–150)
ALT SERPL W P-5'-P-CCNC: 30 U/L (ref 0–50)
ANION GAP SERPL CALCULATED.3IONS-SCNC: 15 MMOL/L (ref 7–15)
APPEARANCE UR: ABNORMAL
AST SERPL W P-5'-P-CCNC: 36 U/L (ref 0–45)
BACTERIA #/AREA URNS HPF: ABNORMAL /HPF
BACTERIA BLD CULT: NO GROWTH
BACTERIA BLD CULT: NO GROWTH
BASOPHILS # BLD AUTO: 0 10E3/UL (ref 0–0.2)
BASOPHILS NFR BLD AUTO: 0 %
BILIRUB SERPL-MCNC: 0.5 MG/DL
BILIRUB UR QL STRIP: NEGATIVE
BUN SERPL-MCNC: 7.3 MG/DL (ref 6–20)
CALCIUM SERPL-MCNC: 8.9 MG/DL (ref 8.8–10.4)
CHLORIDE SERPL-SCNC: 104 MMOL/L (ref 98–107)
COLOR UR AUTO: YELLOW
CREAT SERPL-MCNC: 0.43 MG/DL (ref 0.51–0.95)
EGFRCR SERPLBLD CKD-EPI 2021: >90 ML/MIN/1.73M2
EOSINOPHIL # BLD AUTO: 0.2 10E3/UL (ref 0–0.7)
EOSINOPHIL NFR BLD AUTO: 3 %
ERYTHROCYTE [DISTWIDTH] IN BLOOD BY AUTOMATED COUNT: 14.6 % (ref 10–15)
GLUCOSE SERPL-MCNC: 200 MG/DL (ref 70–99)
GLUCOSE UR STRIP-MCNC: NEGATIVE MG/DL
HCO3 SERPL-SCNC: 20 MMOL/L (ref 22–29)
HCT VFR BLD AUTO: 34.6 % (ref 35–47)
HGB BLD-MCNC: 11.4 G/DL (ref 11.7–15.7)
HGB UR QL STRIP: ABNORMAL
HOLD SPECIMEN: NORMAL
IMM GRANULOCYTES # BLD: 0 10E3/UL
IMM GRANULOCYTES NFR BLD: 0 %
KETONES UR STRIP-MCNC: NEGATIVE MG/DL
LEUKOCYTE ESTERASE UR QL STRIP: ABNORMAL
LYMPHOCYTES # BLD AUTO: 1.6 10E3/UL (ref 0.8–5.3)
LYMPHOCYTES NFR BLD AUTO: 23 %
MCH RBC QN AUTO: 27.5 PG (ref 26.5–33)
MCHC RBC AUTO-ENTMCNC: 32.9 G/DL (ref 31.5–36.5)
MCV RBC AUTO: 84 FL (ref 78–100)
MONOCYTES # BLD AUTO: 0.5 10E3/UL (ref 0–1.3)
MONOCYTES NFR BLD AUTO: 8 %
MUCOUS THREADS #/AREA URNS LPF: PRESENT /LPF
NEUTROPHILS # BLD AUTO: 4.4 10E3/UL (ref 1.6–8.3)
NEUTROPHILS NFR BLD AUTO: 66 %
NITRATE UR QL: NEGATIVE
NRBC # BLD AUTO: 0 10E3/UL
NRBC BLD AUTO-RTO: 0 /100
PH UR STRIP: 6 [PH] (ref 5–7)
PLATELET # BLD AUTO: 183 10E3/UL (ref 150–450)
POTASSIUM SERPL-SCNC: 3.8 MMOL/L (ref 3.4–5.3)
PROT SERPL-MCNC: 6.5 G/DL (ref 6.4–8.3)
RBC # BLD AUTO: 4.14 10E6/UL (ref 3.8–5.2)
RBC URINE: >182 /HPF
SODIUM SERPL-SCNC: 139 MMOL/L (ref 135–145)
SP GR UR STRIP: 1.02 (ref 1–1.03)
SQUAMOUS EPITHELIAL: 5 /HPF
UROBILINOGEN UR STRIP-MCNC: <2 MG/DL
WBC # BLD AUTO: 6.7 10E3/UL (ref 4–11)
WBC URINE: >182 /HPF

## 2025-01-19 PROCEDURE — 81003 URINALYSIS AUTO W/O SCOPE: CPT | Performed by: EMERGENCY MEDICINE

## 2025-01-19 PROCEDURE — 250N000011 HC RX IP 250 OP 636: Performed by: EMERGENCY MEDICINE

## 2025-01-19 PROCEDURE — 85041 AUTOMATED RBC COUNT: CPT | Performed by: EMERGENCY MEDICINE

## 2025-01-19 PROCEDURE — 82040 ASSAY OF SERUM ALBUMIN: CPT | Performed by: EMERGENCY MEDICINE

## 2025-01-19 PROCEDURE — 96375 TX/PRO/DX INJ NEW DRUG ADDON: CPT

## 2025-01-19 PROCEDURE — 94640 AIRWAY INHALATION TREATMENT: CPT

## 2025-01-19 PROCEDURE — 71045 X-RAY EXAM CHEST 1 VIEW: CPT

## 2025-01-19 PROCEDURE — 99284 EMERGENCY DEPT VISIT MOD MDM: CPT | Mod: 25

## 2025-01-19 PROCEDURE — 81001 URINALYSIS AUTO W/SCOPE: CPT | Performed by: EMERGENCY MEDICINE

## 2025-01-19 PROCEDURE — 36415 COLL VENOUS BLD VENIPUNCTURE: CPT | Performed by: EMERGENCY MEDICINE

## 2025-01-19 PROCEDURE — 85004 AUTOMATED DIFF WBC COUNT: CPT | Performed by: EMERGENCY MEDICINE

## 2025-01-19 PROCEDURE — 258N000003 HC RX IP 258 OP 636: Performed by: EMERGENCY MEDICINE

## 2025-01-19 PROCEDURE — 96361 HYDRATE IV INFUSION ADD-ON: CPT

## 2025-01-19 PROCEDURE — 999N000157 HC STATISTIC RCP TIME EA 10 MIN

## 2025-01-19 PROCEDURE — 96365 THER/PROPH/DIAG IV INF INIT: CPT

## 2025-01-19 PROCEDURE — 250N000009 HC RX 250: Performed by: EMERGENCY MEDICINE

## 2025-01-19 PROCEDURE — 87086 URINE CULTURE/COLONY COUNT: CPT | Performed by: EMERGENCY MEDICINE

## 2025-01-19 RX ORDER — ALBUTEROL SULFATE 0.83 MG/ML
5 SOLUTION RESPIRATORY (INHALATION) ONCE
Status: COMPLETED | OUTPATIENT
Start: 2025-01-19 | End: 2025-01-19

## 2025-01-19 RX ORDER — IPRATROPIUM BROMIDE AND ALBUTEROL SULFATE 2.5; .5 MG/3ML; MG/3ML
3 SOLUTION RESPIRATORY (INHALATION) ONCE
Status: COMPLETED | OUTPATIENT
Start: 2025-01-19 | End: 2025-01-19

## 2025-01-19 RX ORDER — CEFTRIAXONE 1 G/1
1 INJECTION, POWDER, FOR SOLUTION INTRAMUSCULAR; INTRAVENOUS ONCE
Status: COMPLETED | OUTPATIENT
Start: 2025-01-19 | End: 2025-01-19

## 2025-01-19 RX ORDER — KETOROLAC TROMETHAMINE 15 MG/ML
15 INJECTION, SOLUTION INTRAMUSCULAR; INTRAVENOUS ONCE
Status: COMPLETED | OUTPATIENT
Start: 2025-01-19 | End: 2025-01-19

## 2025-01-19 RX ORDER — CEPHALEXIN 500 MG/1
500 CAPSULE ORAL 4 TIMES DAILY
Qty: 40 CAPSULE | Refills: 0 | Status: SHIPPED | OUTPATIENT
Start: 2025-01-19 | End: 2025-01-29

## 2025-01-19 RX ORDER — PREDNISONE 20 MG/1
40 TABLET ORAL DAILY
Qty: 10 TABLET | Refills: 0 | Status: SHIPPED | OUTPATIENT
Start: 2025-01-19 | End: 2025-01-24

## 2025-01-19 RX ORDER — METHYLPREDNISOLONE SODIUM SUCCINATE 125 MG/2ML
125 INJECTION INTRAMUSCULAR; INTRAVENOUS ONCE
Status: COMPLETED | OUTPATIENT
Start: 2025-01-19 | End: 2025-01-19

## 2025-01-19 RX ADMIN — ALBUTEROL SULFATE 5 MG: 2.5 SOLUTION RESPIRATORY (INHALATION) at 14:37

## 2025-01-19 RX ADMIN — IPRATROPIUM BROMIDE AND ALBUTEROL SULFATE 3 ML: .5; 3 SOLUTION RESPIRATORY (INHALATION) at 12:58

## 2025-01-19 RX ADMIN — CEFTRIAXONE SODIUM 1 G: 1 INJECTION, POWDER, FOR SOLUTION INTRAMUSCULAR; INTRAVENOUS at 13:14

## 2025-01-19 RX ADMIN — KETOROLAC TROMETHAMINE 15 MG: 15 INJECTION, SOLUTION INTRAMUSCULAR; INTRAVENOUS at 14:43

## 2025-01-19 RX ADMIN — SODIUM CHLORIDE 1000 ML: 9 INJECTION, SOLUTION INTRAVENOUS at 14:43

## 2025-01-19 RX ADMIN — METHYLPREDNISOLONE SODIUM SUCCINATE 125 MG: 125 INJECTION, POWDER, FOR SOLUTION INTRAMUSCULAR; INTRAVENOUS at 14:43

## 2025-01-19 ASSESSMENT — ENCOUNTER SYMPTOMS
SHORTNESS OF BREATH: 1
WHEEZING: 1
HEMATURIA: 0
FEVER: 1
VOMITING: 0
DYSURIA: 1
ABDOMINAL PAIN: 1
BACK PAIN: 1
CHILLS: 1
NAUSEA: 1
HEADACHES: 1

## 2025-01-19 ASSESSMENT — ACTIVITIES OF DAILY LIVING (ADL)
ADLS_ACUITY_SCORE: 59

## 2025-01-19 NOTE — DISCHARGE INSTRUCTIONS
The urinalysis shows that you have a urinary tract infection.  Remaining laboratory tests and chest x-ray were reassuring.  Your respiratory symptoms appear consistent with an asthma exacerbation.      Take the prescribed antibiotic as directed to treat the urinary tract infection.  Take the prescribed prednisone daily for the next 5 days to treat the asthma exacerbation symptoms.  You should continue using your home albuterol nebulizer breathing treatments as needed.    Follow-up with your primary care provider for reevaluation.  Return back to ED sooner for any worsening abdominal pain, back pain, shortness of breath, or any other new or concerning symptoms.

## 2025-01-19 NOTE — ED TRIAGE NOTES
Patient arrives to triage from home with her daughter with chief complaint of painful urination and lower abdominal pain for a day or two now.  Increased frequency and incontinence reported as well.  Patient recently diagnosed with Influenza on 01/14/25 and history of COPD.  Congested cough present.  Denies blood in the urine.  Alert and oriented x4    Daughter providing translation in triage.

## 2025-01-19 NOTE — ED PROVIDER NOTES
EMERGENCY DEPARTMENT ENCOUNTER      NAME: Kulwant Amin  AGE: 55 year old female  YOB: 1970  MRN: [id number]  EVALUATION DATE & TIME: 2025 11:57 AM    PCP: Adrien Cardoza    ED PROVIDER: Leela Blas DO      Chief Complaint   Patient presents with    Dysuria    Abdominal Pain         FINAL IMPRESSION:  1. Mild intermittent asthma with exacerbation    2. Urinary tract infection with hematuria, site unspecified          ED COURSE & MEDICAL DECISION MAKIN-year-old female with history of COPD, CAD, asthma, diabetes presented to the ED for evaluation of dysuria, urinary frequency, and left low back pain.  In addition to this she also reports experiencing mild shortness of breath and wheezing for the last few days.  Upon arrival to the ED the patient was noted to be tachycardic.  She was otherwise hemodynamically stable.  The patient was fatigued and slightly uncomfortable appearing.  However, she did not appear to be in acute distress and she was not acutely ill-appearing.  On exam she was noted to have diffuse tenderness palpation throughout the lower abdomen.  She did not have any CVA tenderness palpation, however.  She also had diffuse wheezing noted bilaterally.     Following her initial evaluation the patient was given a DuoNeb breathing treatment.    CBC and CMP were both reassuring.  The urinalysis shows that the patient is urinary tract infection.  The patient was then started on IV Rocephin.    Chest x-ray was nondiagnostic.    The patient was reevaluated and both the patient and her daughter were informed of the lab and chest x-ray results.  The patient reported no significant improvement with her shortness of breath and wheezing despite receiving the DuoNeb breathing treatment.  The patient was then given an albuterol nebulizer breathing treatment as well as IV methylprednisolone.  The patient was also given a dose of IV Toradol.     The patient was reevaluated.  The patient was at the  breathing and abdominal pain both improved with the medications.  The patient and her daughter felt comfortable returning home.  The patient was sent home with a 5-day course of prednisone to treat her asthma exacerbation.  She was also sent home with a 10-day course of Keflex to treat a urinary tract infection/pyelonephritis.  The patient was instructed to follow-up with her primary care provider for routine reevaluation within the next few days.  She was instructed to return back to ED sooner for any worsening shortness of breath, chest pain, abdominal pain, fever, vomiting, or any other new or concerning symptoms.    Pertinent Labs & Imaging studies reviewed. (See chart for details)  1:01 PM I met with the patient to gather history and to perform my initial exam. We discussed plans for the ED course, including diagnostic testing and treatment.       At the conclusion of the encounter I discussed the results of all of the tests and the disposition. The questions were answered. The patient or family acknowledged understanding and was agreeable with the care plan.     Medical Decision Making    History:  Supplemental history from: Family Member/Significant Other  External Record(s) reviewed: Documented in chart and Inpatient Record:  01/14/2025 at [facility]    Work Up:  Chart documentation includes differential considered and any EKGs or imaging independently interpreted by provider, where specified.  In additional to work up documented, I considered the following work up: Documented in chart, if applicable.    External consultation:  Discussion of management with another provider: Documented in chart, if applicable    Complicating factors:  Care impacted by chronic illness: Chronic Pain, Diabetes, and Other: CAD and COPD  Care affected by social determinants of health: N/A    Disposition considerations: Discharge. I prescribed additional prescription strength medication(s) as charted. See  documentation for any additional details.    Not Applicable      PPE worn: n95 mask, goggles    MEDICATIONS GIVEN IN THE EMERGENCY:  Medications   ipratropium - albuterol 0.5 mg/2.5 mg/3 mL (DUONEB) neb solution 3 mL (3 mLs Nebulization $Given 1/19/25 1258)   cefTRIAXone (ROCEPHIN) 1 g vial to attach to  mL bag for ADULTS or NS 50 mL bag for PEDS (0 g Intravenous Stopped 1/19/25 1400)   albuterol (PROVENTIL) neb solution 5 mg (5 mg Nebulization $Given 1/19/25 1437)   methylPREDNISolone Na Suc (solu-MEDROL) injection 125 mg (125 mg Intravenous $Given 1/19/25 1443)   ketorolac (TORADOL) injection 15 mg (15 mg Intravenous $Given 1/19/25 1443)   sodium chloride 0.9% BOLUS 1,000 mL (0 mLs Intravenous Stopped 1/19/25 1646)       NEW PRESCRIPTIONS STARTED AT TODAY'S ER VISIT  Discharge Medication List as of 1/19/2025  4:46 PM        START taking these medications    Details   cephALEXin (KEFLEX) 500 MG capsule Take 1 capsule (500 mg) by mouth 4 times daily for 10 days., Disp-40 capsule, R-0, Local Print                =================================================================    HPI    Patient information was obtained from: Daughter    Use of : Yes: In person family. Language [facility].        Kulwant Amin is a 55 year old female with a pertinent history of moderate persistent asthma, COPD, epigastric pain, chronic abdominal pain, CAD, GERD, hypertension, diabetes type 2 who presents to this ED by car for evaluation of dysuria and abdominal pain.    Per chart review, on 01/14/2025 at [facility] the pt was experiencing headache, SOB, dizziness, and cough. Everybody in their home at that time was sick with similar symptoms however they recovered by the time the pt symptoms began. The pt had a chest X-Ray, viral testing and lab work done. CBC showed leukocytosis or significant anemia. BMP was reassuring. Influenza A was positive and suspected this is the precipitant cause of her  "symptoms.  Pro-Arron negative and chest x-ray did not see any obvious focal consolidation to suggest pneumonia.  Initial lactate was slightly elevated 2.1 we did repeat this after 1 L fluid bolus.    Per daughter:    The pt is experiencing lower abdominal pain and dysuria. She also reports that she is urinating more frequently than baseline ~1-2 times per minute which is usually \"just a little bit\" and she does have a history of a UTI. She also indicated lower left side back pain.     She currently has influenza and is experiencing the following symptoms, nausea, wheezing, more SOB than baseline, headache, and decreased fluid intake.    The pt denies vomiting or hematuria. All other symptoms also denied.         REVIEW OF SYSTEMS   Review of Systems   Constitutional:  Positive for chills and fever.        Decreased intake of fluids.    Respiratory:  Positive for shortness of breath and wheezing.    Gastrointestinal:  Positive for abdominal pain (Lower Abd pain.) and nausea. Negative for vomiting.   Endocrine: Positive for polyuria (Every 1-2 minutes with small output.).   Genitourinary:  Positive for dysuria. Negative for hematuria.   Musculoskeletal:  Positive for back pain (Lower left side.).   Neurological:  Positive for headaches.        PAST MEDICAL HISTORY:  Past Medical History:   Diagnosis Date    Acute asthma exacerbation 01/06/2020    Anxiety     Arthritis     Asthma exacerbation 11/19/2015    Chronic obstructive pulmonary disease, unspecified COPD type (H)     COPD (chronic obstructive pulmonary disease) (H)     Coronary artery disease due to lipid rich plaque     Depression     Diabetes (H)     Epigastric pain 12/15/2021    Essential hypertension     GERD (gastroesophageal reflux disease)     Infection due to 2019 novel coronavirus 01/03/2022    positive with COVID-19 on January 3, 2022    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    Microcytic anemia 11/17/2019    Motion sickness     PONV (postoperative nausea and " vomiting)     S/P coronary artery stent placement 02/02/2018    Sleep apnea     TB lung, latent     9 mos INH       PAST SURGICAL HISTORY:  Past Surgical History:   Procedure Laterality Date    CORONARY STENT PLACEMENT  2018    CV CORONARY ANGIOGRAM N/A 1/25/2018    Procedure: Coronary Angiogram;  Surgeon: Angelo Serrano MD;  Location: [facility] Cath Lab;  Service:     CV CORONARY ANGIOGRAM N/A 5/5/2022    Procedure: Coronary Angiogram;  Surgeon: Irwin Cano MD;  Location: [facility] CATH LAB CV    CV CORONARY ANGIOGRAM  5/5/2022    Procedure: ;  Surgeon: Irwin Cano MD;  Location: [facility] LAB CV    ESOPHAGOSCOPY, GASTROSCOPY, DUODENOSCOPY (EGD), COMBINED N/A 11/21/2024    Procedure: ESOPHAGOGASTRODUODENOSCOPY with BIOPSY;  Surgeon: Gomez Bella MD;  Location: [facility] OR    SD ESOPHAGOGASTRODUODENOSCOPY TRANSORAL DIAGNOSTIC N/A 12/10/2018    Procedure: ESOPHAGOGASTRODUODENOSCOPY (EGD);  Surgeon: Eddie Renteria MD;  Location: [facility];  Service: Gastroenterology    SD ESOPHAGOGASTRODUODENOSCOPY TRANSORAL DIAGNOSTIC N/A 12/3/2020    Procedure: ESOPHAGOGASTRODUODENOSCOPY (EGD) with biospies ;  Surgeon: Avi Crow MD;  Location: [facility];  Service: Gastroenterology    [facility] COLONOSCOPY W/WO BRUSH/WASH N/A 12/10/2018    Procedure: COLONOSCOPY with polypectomy using biopsy forceps;  Surgeon: Eddie Renteria MD;  Location: [facility];  Service: Gastroenterology           CURRENT MEDICATIONS:    cephALEXin (KEFLEX) 500 MG capsule  predniSONE (DELTASONE) 20 MG tablet  acetaminophen (TYLENOL) 500 MG tablet  albuterol (PROAIR HFA/PROVENTIL HFA/VENTOLIN HFA) 108 (90 Base) MCG/ACT inhaler  albuterol (PROAIR HFA/PROVENTIL HFA/VENTOLIN HFA) 108 (90 Base) MCG/ACT inhaler  albuterol (PROVENTIL) (2.5 MG/3ML) 0.083% neb solution  albuterol (PROVENTIL) (2.5 MG/3ML) 0.083% neb solution  amitriptyline (ELAVIL) 50 MG tablet  aspirin 81 MG EC tablet  atorvastatin (LIPITOR) 80 MG  tablet  calcium carbonate (TUMS) 500 MG chewable tablet  colchicine (COLCRYS) 0.6 MG tablet  Continuous Glucose  (FREESTYLE MONICA 14 DAY READER) MICHAEL  Continuous Glucose Sensor (FREESTYLE MONICA 2 SENSOR) MISC  CONTOUR NEXT TEST test strip  diclofenac (VOLTAREN) 1 % topical gel  ferrous sulfate (FEROSUL) 325 (65 Fe) MG tablet  FLUoxetine (PROZAC) 20 MG capsule  fluticasone-vilanterol (BREO ELLIPTA) 200-25 MCG/ACT inhaler  gabapentin (NEURONTIN) 600 MG tablet  galcanezumab-gnlm (EMGALITY) 120 MG/ML injection  galcanezumab-gnlm (EMGALITY) 120 MG/ML injection  guaiFENesin (ROBITUSSIN) 20 mg/mL liquid  insulin aspart (NOVOLOG PEN) 100 UNIT/ML pen  insulin glargine (LANTUS PEN) 100 UNIT/ML pen  insulin pen needle (32G X 4 MM) 32G X 4 MM miscellaneous  ipratropium - albuterol 0.5 mg/2.5 mg/3 mL (DUONEB) 0.5-2.5 (3) MG/3ML neb solution  Lidocaine (LIDOCARE) 4 % Patch  lisinopril (ZESTRIL) 5 MG tablet  meclizine (ANTIVERT) 25 MG tablet  metFORMIN (GLUCOPHAGE XR) 500 MG 24 hr tablet  metoprolol tartrate (LOPRESSOR) 25 MG tablet  montelukast (SINGULAIR) 10 MG tablet  NEXIUM 40 MG DR capsule  oseltamivir (TAMIFLU) 75 MG capsule  polyethylene glycol (MIRALAX) 17 GM/Dose powder  polyethylene glycol (MIRALAX) 17 GM/Dose powder  sennosides (SENOKOT) 8.6 MG tablet  sucralfate (CARAFATE) 1 GM/10ML suspension  tiotropium (SPIRIVA RESPIMAT) 2.5 MCG/ACT inhaler  tiZANidine (ZANAFLEX) 2 MG tablet  vitamin D3 (CHOLECALCIFEROL) 10 MCG (400 UNIT) capsule  Vitamin D3 (VITAMIN D) 10 MCG (400 UNIT) tablet        ALLERGIES:  No Known Allergies    FAMILY HISTORY:  Family History   Problem Relation Age of Onset    Other - See Comments Mother          of an intestinal problem    Ulcers Father          of gastritis    Breast Cancer No family hx of     Ovarian Cancer No family hx of     Colon Cancer No family hx of        SOCIAL HISTORY:   Social History     Socioeconomic History    Marital status:    Tobacco Use    Smoking  status: Former     Current packs/day: 0.00     Average packs/day: 1 pack/day for 30.0 years (30.0 ttl pk-yrs)     Types: Cigarettes     Start date: 1973     Quit date: 2003     Years since quittin.0     Passive exposure: Never    Smokeless tobacco: Never    Tobacco comments:     No passive exposure   Vaping Use    Vaping status: Never Used   Substance and Sexual Activity    Alcohol use: No    Drug use: No    Sexual activity: Yes     Partners: Male   Social History Narrative    2017 The patient lives with her daughter-in-law (who is present), , son, and 2 grandchildren (total of 6 people). Immigrant.     Social Drivers of Health     Financial Resource Strain: Low Risk  (2024)    Financial Resource Strain     Within the past 12 months, have you or your family members you live with been unable to get utilities (heat, electricity) when it was really needed?: No   Food Insecurity: Low Risk  (2024)    Food Insecurity     Within the past 12 months, did you worry that your food would run out before you got money to buy more?: No     Within the past 12 months, did the food you bought just not last and you didn t have money to get more?: No   Transportation Needs: Low Risk  (2024)    Transportation Needs     Within the past 12 months, has lack of transportation kept you from medical appointments, getting your medicines, non-medical meetings or appointments, work, or from getting things that you need?: No   Interpersonal Safety: Low Risk  (2024)    Interpersonal Safety     Do you feel physically and emotionally safe where you currently live?: Yes     Within the past 12 months, have you been hit, slapped, kicked or otherwise physically hurt by someone?: No     Within the past 12 months, have you been humiliated or emotionally abused in other ways by your partner or ex-partner?: No   Recent Concern: Interpersonal Safety - High Risk (2024)    Interpersonal Safety     Do you feel  physically and emotionally safe where you currently live?: No     Within the past 12 months, have you been hit, slapped, kicked or otherwise physically hurt by someone?: No     Within the past 12 months, have you been humiliated or emotionally abused in other ways by your partner or ex-partner?: No   Housing Stability: Low Risk  (9/30/2024)    Housing Stability     Do you have housing? : Yes     Are you worried about losing your housing?: No       VITALS:  /69   Pulse 90   Temp 97.9  F (36.6  C) (Oral)   Resp 20   Ht 1.524 m (5')   Wt 53.5 kg (118 lb)   SpO2 98%   BMI 23.05 kg/m      PHYSICAL EXAM    General presentation: Alert, Vital signs reviewed. Fatigued appearing. NAD  HENT: ENT inspection is normal. Oropharynx is moist and clear.   Eye: Pupils are equal and reactive to light. EOMI  Neck: The neck is supple, with full ROM, with no evidence of meningismus.  Pulmonary: Currently in no acute respiratory distress. Normal, non labored respirations,  Mild diffuse crackles bilaterally, Mild expiratory wheezing noted bilaterally.   Circulatory: Regular rate and rhythm. Peripheral pulses are strong and equal. No murmurs, rubs, or gallops.   Abdominal: The abdomen is soft. Diffuse tenderness to palpation throughout lower abdomen. No rigidity, guarding, or rebound. Bowel sounds normal.   Neurologic: Alert, oriented to person, place, and time. No motor deficit. No sensory deficit. Cranial nerves II through XII are intact.  Musculoskeletal: No extremity tenderness. Full range of motion in all extremities. No extremity edema. No CVA tenderness in the back.  Skin: Skin color is normal. No rash. Warm. Dry to touch.          LAB:  All pertinent labs reviewed and interpreted.  Results for orders placed or performed during the hospital encounter of 01/19/25   XR Chest Port 1 View    Impression    IMPRESSION: Negative chest.   UA with Microscopic reflex to Culture    Specimen: Urine, NOS   Result Value Ref Range     Color Urine Yellow Colorless, Straw, Light Yellow, Yellow    Appearance Urine Cloudy (A) Clear    Glucose Urine Negative Negative mg/dL    Bilirubin Urine Negative Negative    Ketones Urine Negative Negative mg/dL    Specific Gravity Urine 1.017 1.001 - 1.030    Blood Urine >1.0 mg/dL (A) Negative    pH Urine 6.0 5.0 - 7.0    Protein Albumin Urine 100 (A) Negative mg/dL    Urobilinogen Urine <2.0 <2.0 mg/dL    Nitrite Urine Negative Negative    Leukocyte Esterase Urine 500 Shannan/uL (A) Negative    Bacteria Urine Few (A) None Seen /HPF    Mucus Urine Present (A) None Seen /LPF    RBC Urine >182 (H) <=2 /HPF    WBC Urine >182 (H) <=5 /HPF    Squamous Epithelials Urine 5 (H) <=1 /HPF   Comprehensive metabolic panel   Result Value Ref Range    Sodium 139 135 - 145 mmol/L    Potassium 3.8 3.4 - 5.3 mmol/L    Carbon Dioxide (CO2) 20 (L) 22 - 29 mmol/L    Anion Gap 15 7 - 15 mmol/L    Urea Nitrogen 7.3 6.0 - 20.0 mg/dL    Creatinine 0.43 (L) 0.51 - 0.95 mg/dL    GFR Estimate >90 >60 mL/min/1.73m2    Calcium 8.9 8.8 - 10.4 mg/dL    Chloride 104 98 - 107 mmol/L    Glucose 200 (H) 70 - 99 mg/dL    Alkaline Phosphatase 91 40 - 150 U/L    AST 36 0 - 45 U/L    ALT 30 0 - 50 U/L    Protein Total 6.5 6.4 - 8.3 g/dL    Albumin 3.5 3.5 - 5.2 g/dL    Bilirubin Total 0.5 <=1.2 mg/dL   CBC with platelets and differential   Result Value Ref Range    WBC Count 6.7 4.0 - 11.0 10e3/uL    RBC Count 4.14 3.80 - 5.20 10e6/uL    Hemoglobin 11.4 (L) 11.7 - 15.7 g/dL    Hematocrit 34.6 (L) 35.0 - 47.0 %    MCV 84 78 - 100 fL    MCH 27.5 26.5 - 33.0 pg    MCHC 32.9 31.5 - 36.5 g/dL    RDW 14.6 10.0 - 15.0 %    Platelet Count 183 150 - 450 10e3/uL    % Neutrophils 66 %    % Lymphocytes 23 %    % Monocytes 8 %    % Eosinophils 3 %    % Basophils 0 %    % Immature Granulocytes 0 %    NRBCs per 100 WBC 0 <1 /100    Absolute Neutrophils 4.4 1.6 - 8.3 10e3/uL    Absolute Lymphocytes 1.6 0.8 - 5.3 10e3/uL    Absolute Monocytes 0.5 0.0 - 1.3 10e3/uL     Absolute Eosinophils 0.2 0.0 - 0.7 10e3/uL    Absolute Basophils 0.0 0.0 - 0.2 10e3/uL    Absolute Immature Granulocytes 0.0 <=0.4 10e3/uL    Absolute NRBCs 0.0 10e3/uL   Extra Blue Top Tube   Result Value Ref Range    Hold Specimen JIC    Extra Red Top Tube   Result Value Ref Range    Hold Specimen JIC    Extra Green Top (Lithium Heparin) Tube   Result Value Ref Range    Hold Specimen JIC    Extra Purple Top Tube   Result Value Ref Range    Hold Specimen JIC        RADIOLOGY:  Reviewed all pertinent imaging. Please see official radiology report.  XR Chest Port 1 View   Final Result   IMPRESSION: Negative chest.            I, Holly Tillman , am serving as a scribe to document services personally performed by Leela Blas DO based on my observation and the provider's statements to me. I, Leela Blas, attest that Holly Tillman is acting in a scribe capacity, has observed my performance of the services and has documented them in accordance with my direction.    Leela Blas DO  Emergency Medicine  [facility] EMERGENCY DEPARTMENT  [address] [place] [id number]  [phone]       Leela Blas DO  01/19/25 [id number]

## 2025-01-20 LAB — BACTERIA UR CULT: NORMAL

## 2025-01-20 NOTE — RESULT ENCOUNTER NOTE
Final urine culture report is negative.  Adult: Negative urine culture parameters per protocol: Any # urogenital keyonna, single or mixed   [facility] Emergency Dept discharge antibiotic prescribed (If applicable): Cephalexin  Treatment recommendations per [facility] ED Lab Result Urine Culture protocol: No change in plan of care.

## 2025-01-28 ENCOUNTER — TELEPHONE (OUTPATIENT)
Dept: PULMONOLOGY | Facility: CLINIC | Age: 55
End: 2025-01-28
Payer: COMMERCIAL

## 2025-01-28 DIAGNOSIS — J45.51 SEVERE PERSISTENT ASTHMA WITH EXACERBATION (H): Primary | ICD-10-CM

## 2025-01-28 RX ORDER — PREDNISONE 10 MG/1
TABLET ORAL
Qty: 30 TABLET | Refills: 0 | Status: SHIPPED | OUTPATIENT
Start: 2025-01-28

## 2025-01-28 NOTE — TELEPHONE ENCOUNTER
Called and spoke with Billy.  Patient received prednisone 40mg daily for 5 days from the ED after being seen for Asthma exacerbation on 1/19/25.  She completed this a couple of days ago, but cough is still not gone.  Billy states some better, but not gone.  No phlegm, just dry cough. No other symptoms.      Will send extended prednisone 12-day taper.  Instructed that if this is not helpful, she would need to be seen in clinic for evaluation and treatment.

## 2025-01-28 NOTE — TELEPHONE ENCOUNTER
[facility] Call Center    Phone Message    May a detailed message be left on voicemail: yes     Reason for Call: Medication Refill Request    Has the patient contacted the pharmacy for the refill? Yes   Name of medication being requested: prednisone   Provider who prescribed the medication: laurel  Pharmacy: [facility] - [place], [place] - [address]   Date medication is needed: 01/28/25      Action Taken: Message routed to:  Other: pulm    Travel Screening: Not Applicable     Date of Service: 01/28/25

## 2025-02-03 DIAGNOSIS — Z76.0 ENCOUNTER FOR MEDICATION REFILL: ICD-10-CM

## 2025-02-03 RX ORDER — LANCETS 30 GAUGE
EACH MISCELLANEOUS
Qty: 200 EACH | Refills: 11 | Status: SHIPPED | OUTPATIENT
Start: 2025-02-03

## 2025-02-04 DIAGNOSIS — Z79.4 TYPE 2 DIABETES MELLITUS TREATED WITH INSULIN (H): ICD-10-CM

## 2025-02-04 DIAGNOSIS — E11.9 TYPE 2 DIABETES MELLITUS TREATED WITH INSULIN (H): ICD-10-CM

## 2025-02-04 RX ORDER — PEN NEEDLE, DIABETIC 32GX 5/32"
NEEDLE, DISPOSABLE MISCELLANEOUS
Qty: 300 EACH | Refills: 2 | Status: SHIPPED | OUTPATIENT
Start: 2025-02-04

## 2025-02-11 NOTE — TELEPHONE ENCOUNTER
Orders signed BUT unsure if insurance will cover.    Dr. Adrien Cardoza  12/8/2020 11:54 AM     Adult

## 2025-02-20 ENCOUNTER — TRANSFERRED RECORDS (OUTPATIENT)
Dept: HEALTH INFORMATION MANAGEMENT | Facility: CLINIC | Age: 55
End: 2025-02-20
Payer: COMMERCIAL

## 2025-02-20 ENCOUNTER — TELEPHONE (OUTPATIENT)
Dept: CARDIOLOGY | Facility: CLINIC | Age: 55
End: 2025-02-20
Payer: COMMERCIAL

## 2025-02-20 NOTE — TELEPHONE ENCOUNTER
Spoke with pharmacy and confirmed filled and picked up on 01/13/2025 for 90 day supply. Notified son and he will follow up with Pt-micaela

## 2025-02-20 NOTE — TELEPHONE ENCOUNTER
[facility] Call Center    Phone Message    May a detailed message be left on voicemail: yes     Reason for Call: Medication Refill Request    Has the patient contacted the pharmacy for the refill? Yes   Name of medication being requested: colchicine , 81mg asprin  Provider who prescribed the medication: Joycelyn  Pharmacy:    [facility] - [place], [place] - [address]  Date medication is needed: asap, pt has been out for a week now       Action Taken: Other: cardio    Travel Screening: Not Applicable     Date of Service:

## 2025-02-27 ENCOUNTER — TRANSFERRED RECORDS (OUTPATIENT)
Dept: HEALTH INFORMATION MANAGEMENT | Facility: CLINIC | Age: 55
End: 2025-02-27

## 2025-02-27 ENCOUNTER — OFFICE VISIT (OUTPATIENT)
Dept: FAMILY MEDICINE | Facility: CLINIC | Age: 55
End: 2025-02-27
Payer: COMMERCIAL

## 2025-02-27 VITALS
WEIGHT: 120.44 LBS | RESPIRATION RATE: 16 BRPM | TEMPERATURE: 98 F | BODY MASS INDEX: 23.65 KG/M2 | HEART RATE: 88 BPM | HEIGHT: 60 IN | OXYGEN SATURATION: 97 % | DIASTOLIC BLOOD PRESSURE: 74 MMHG | SYSTOLIC BLOOD PRESSURE: 120 MMHG

## 2025-02-27 DIAGNOSIS — F33.9 RECURRENT MAJOR DEPRESSIVE DISORDER, REMISSION STATUS UNSPECIFIED: ICD-10-CM

## 2025-02-27 DIAGNOSIS — I25.83 CORONARY ARTERY DISEASE DUE TO LIPID RICH PLAQUE: Chronic | ICD-10-CM

## 2025-02-27 DIAGNOSIS — G89.29 CHRONIC NONINTRACTABLE HEADACHE, UNSPECIFIED HEADACHE TYPE: Chronic | ICD-10-CM

## 2025-02-27 DIAGNOSIS — R05.9 COUGH, UNSPECIFIED TYPE: ICD-10-CM

## 2025-02-27 DIAGNOSIS — R51.9 CHRONIC NONINTRACTABLE HEADACHE, UNSPECIFIED HEADACHE TYPE: Chronic | ICD-10-CM

## 2025-02-27 DIAGNOSIS — Z79.4 TYPE 2 DIABETES MELLITUS TREATED WITH INSULIN (H): Primary | ICD-10-CM

## 2025-02-27 DIAGNOSIS — I10 ESSENTIAL HYPERTENSION: ICD-10-CM

## 2025-02-27 DIAGNOSIS — I25.10 CORONARY ARTERY DISEASE DUE TO LIPID RICH PLAQUE: Chronic | ICD-10-CM

## 2025-02-27 DIAGNOSIS — J44.9 CHRONIC OBSTRUCTIVE PULMONARY DISEASE, UNSPECIFIED COPD TYPE (H): ICD-10-CM

## 2025-02-27 DIAGNOSIS — E11.9 TYPE 2 DIABETES MELLITUS TREATED WITH INSULIN (H): Primary | ICD-10-CM

## 2025-02-27 RX ORDER — AZITHROMYCIN 250 MG/1
TABLET, FILM COATED ORAL
Qty: 6 TABLET | Refills: 0 | Status: SHIPPED | OUTPATIENT
Start: 2025-02-27 | End: 2025-03-04

## 2025-02-27 RX ORDER — ASPIRIN 81 MG/1
81 TABLET ORAL DAILY
Qty: 90 TABLET | Refills: 3 | Status: SHIPPED | OUTPATIENT
Start: 2025-02-27

## 2025-02-27 RX ORDER — COLCHICINE 0.6 MG/1
0.6 TABLET ORAL DAILY
Qty: 90 TABLET | Refills: 3 | Status: CANCELLED | OUTPATIENT
Start: 2025-02-27

## 2025-02-27 RX ORDER — GUAIFENESIN 200 MG/10ML
200 LIQUID ORAL EVERY 6 HOURS PRN
Qty: 200 ML | Refills: 0 | Status: SHIPPED | OUTPATIENT
Start: 2025-02-27

## 2025-02-27 NOTE — PROGRESS NOTES
Type 2 diabetes mellitus treated with insulin (H)  A1c at next visit.  No med changes today.  Discussed endocrinology referral but patient declined.    Essential hypertension  Controlled.    Recurrent major depressive disorder, remission status unspecified  Stable.    Cough, unspecified type  Pleated 2 courses of oral steroid recently.  Still complaining of cough.  Z-Isaiah prescribed due to duration of cough and history of asthma/COPD.  - azithromycin (ZITHROMAX) 250 MG tablet; Take 2 tablets (500 mg) by mouth daily for 1 day, THEN 1 tablet (250 mg) daily for 4 days.  - guaiFENesin (ROBITUSSIN) 20 mg/mL liquid; Take 10 mLs (200 mg) by mouth every 6 hours as needed for cough.    Coronary artery disease due to lipid rich plaque  - aspirin 81 MG EC tablet; Take 1 tablet (81 mg) by mouth daily.  Followed by cardiology.    Chronic obstructive pulmonary disease, unspecified COPD type (H)  Managed by pulmonology.    Chronic nonintractable headache, unspecified headache type  Managed by neurology.    Doug Blum is a 55 year old female with multiple chronic medical conditions seeing multiple specialist here to follow-up on diabetes and depression.  She is here with her daughter-in-law.  Patient reports cough for the past 2 weeks, No worsening shortness of breath.  She was seen in the ED about a month ago due to asthma exacerbation, was given short course oral steroid from the ED.  Daughter-in-law called pulmonology 9 days after the ED visit and was given 12 days steroid taper dose.  Patient states cough improved after taking steroid but coughing again for the past 2 weeks.  No fever at this time.    She is using Lantus 44 units daily and mealtime insulin 10 units before breakfast and 15 units before lunch or dinner.  She eats 2 meals most days per daughter-in-law.  No low blood sugar with hypoglycemic symptoms.  Last A1c was 7.8, 2 months ago.    Depression symptoms seems to be stable.        2/27/2025    12:32 PM    Additional Questions   Roomed by PAWEL Reina   Accompanied by Billy : Daughter in Law         Review of Systems  CONSTITUTIONAL: NEGATIVE for fever, chills, change in weight  ENT/MOUTH: NEGATIVE for ear, mouth and throat problems  CV: NEGATIVE for chest pain/chest pressure      Objective    /74 (BP Location: Left arm, Patient Position: Sitting, Cuff Size: Adult Regular)   Pulse 88   Temp 98  F (36.7  C) (Oral)   Resp 16   Ht 1.524 m (5')   Wt 54.6 kg (120 lb 7 oz)   SpO2 97%   BMI 23.52 kg/m    Body mass index is 23.52 kg/m .  Physical Exam   GENERAL: alert and no distress  NECK: Supple  RESP: Coarse breath sounds bilaterally with no crackles or rhonchi.  Mild wheezing bilaterally.  CV: regular rates and rhythm and no murmur, click or rub  ABDOMEN: soft, nontender  PSYCH: mentation appears normal  Diabetic foot exam: no trophic changes or ulcerative lesions    This transcription uses voice recognition software, which may contain typographical errors.    The longitudinal plan of care for the diagnosis(es)/condition(s) as documented were addressed during this visit. Due to the added complexity in care, I will continue to support Kulwant in the subsequent management and with ongoing continuity of care.          Signed Electronically by: Adrien Cardoza MD

## 2025-03-12 ENCOUNTER — TELEPHONE (OUTPATIENT)
Dept: PULMONOLOGY | Facility: CLINIC | Age: 55
End: 2025-03-12
Payer: COMMERCIAL

## 2025-03-12 DIAGNOSIS — J45.51 SEVERE PERSISTENT ASTHMA WITH EXACERBATION (H): ICD-10-CM

## 2025-03-12 RX ORDER — PREDNISONE 10 MG/1
TABLET ORAL
Qty: 30 TABLET | Refills: 0 | Status: SHIPPED | OUTPATIENT
Start: 2025-03-12

## 2025-03-12 NOTE — TELEPHONE ENCOUNTER
[facility] Call Center    Phone Message    May a detailed message be left on voicemail: yes     Reason for Call: Symptoms or Concerns     Current symptom or concern: coughing, wheezing     Symptoms have been present for:  3-4 day(s)    Are there any new or worsening symptoms? Yes: getting a little bit worse.     Action Taken: Message routed to:  Other: [facility]     Travel Screening: Not Applicable     Date of Service: 3/12

## 2025-03-24 NOTE — PROGRESS NOTES
Medication Therapy Management (MTM) Encounter    ASSESSMENT:                            Medication Adherence/Access: See below for considerations.    1. Type 2 diabetes mellitus treated with insulin (H)  A1c not at goal <7%. Patient would benefit from an additional medication for glycemic control. Would also benefit from an increased dose of novolog, educated patient to only use novolog twice daily before eating, that she should not use 15-20 minutes after eating. Patient vocalized her understanding.     2. Essential hypertension  BP at goal <130/80 mmHg, but patient continues to experience dizziness. Patient would benefit from a decreased dose of metoprolol.     3. Hyperlipidemia, unspecified hyperlipidemia type  LDL at goal, continue current therapy. Continue medications with no changes.   Managed by cardiology.      4. Severe persistent asthma/allergies  Managed by pulmonologist and allergist. Discussed Te[place]sp[place] with patient today. Patient said she is planning to discuss getting the injection again with the allergist in May as she found it helpful and wants to receive again.     Inhaler technique assessed in clinic today. Patient correctly used breo ellipta, but originally when patient used Spiriva she put the inhaler in her mouth and breathed in without twisting the inhaler. Then she removed it from her mouth and twisted it while the mouthpiece was not at her mouth, allowing medication to spray into the air. Education provided and patient correctly demonstrated use of spiriva inhaler in clinic today. Patient has likely not been taking as inhaler brought in filled on 2/3 was full with no doses used prior to appointment. Recommended patient use 2 puffs every night.      5. Constipation, unspecified constipation type  Stable.      6. Gastroesophageal reflux disease without esophagitis  Managed by GI. Educated patient that tums and sucralfate can both be taken more than once per day.   Patient on both amitriptyline  and nortriptyline which is generally not recommended. Sent message to Stephanie Ramsey at [facility] about patient being on both medications, awaiting response from [facility] about how to proceed.      7. Chronic nonintractable headache, unspecified headache type  Managed by neurology. Patient reported headaches have gotten worse since emgality was stopped. Patient reported she plans to discuss with neurologist at next appointment.   Sent message to neurologist about patient being on both amitriptyline and nortriptyline, awaiting direction for neurology about how to proceed.     Of note, if decision made to continue nortriptyline for dysphagia, this can also be used for migraine prevention and would recommend discontinuing amitriptyline to avoid duplicate therapy.       8. Recurrent major depressive disorder, remission status unspecified (H24)  Stable.      9. Takes dietary supplements  Stable.      10. Pericarditis, unspecified chronicity, unspecified type  Stable. Managed by cardiology.      11. Pain in both knees, unspecified chronicity  Managed by neurology. Stable with tylenol.     12. Dizziness  Managed by neurology. Patient reported dizziness has improved with meclizine, although she still gets dizzy about once per day, see above.      13. Cough  Resolved    PLAN:                            START ozempic 0.25mg weekly    Use novolog 14 units twice daily before meals - use 5-10 minutes before meals  Use Spiriva inhaler 2 puffs every night - education provided to patient today  TUMS can be used twice daily as needed for heartburn  Sucralfate is prescribed to be taken 4 times daily   DECREASE metoprolol to 12.5mg (one-half tablet) twice daily   Routed to PCP, neurology, and GI regarding concern of duplicate therapy      Follow-up: Return in 10 weeks (on 6/3/2025) for with PharmD.      SUBJECTIVE/OBJECTIVE:                          Kulwant Amin is a 55 year old female seen for a follow-up visit and complete medication review.  Patient was accompanied by daughter-in-law.  (ID# [id number]) was used during today's visit.       Reason for visit: MTM follow-up.    Allergies/ADRs: Reviewed in chart  Past Medical History: Reviewed in chart  Tobacco: She reports that she quit smoking about 22 years ago. Her smoking use included cigarettes. She started smoking about 52 years ago. She has a 30 pack-year smoking history. She has never been exposed to tobacco smoke. She has never used smokeless tobacco.  Alcohol: none    Medication Adherence/Access: Patient has a home health nurse that sets up a three times daily pillbox. Patient reports no missed doses in the past week.   Medication vials brought into clinic today.     Diabetes   Metformin  mg 2 tablets twice daily with food  Lantus 40 units once daily at night   Novolog 16 units twice daily 10 minutes before meals - using 10 units about 15 minutes before lunch and then 15-20 minutes after lunch    Daughter in law helps patient with insulin administration, no missed doses.   Reports no medication side effects.   Current diabetes symptoms: low: shaky - aware of how to correct for lows if needed.   Diet/Exercise: Reports eating 2 meals per day + snack in middle of day, 11 am and 6:30-7, usually eats brown rice, beans, lentils, veggies. Has decreased portion size and eating better.   Med Hx: metformin IR (nausea/vomiting)      Blood sugar monitoring: Gabe  Continues to experience dizziness, but declines other symptoms of hypoglycemia.   1 reading <70 on CGM report, was 69 overnight on 3/22, but then quickly emanuel above 70 - could have been an error.     Eye exam is up to date  Foot exam is up to date    Hypertension   /CAD/SVT  Lisinopril 5mg daily   Metoprolol tartrate 25 mg twice daily   Patient reports the following medication side effects: dizziness which has been the same as before. Reports feeling dizzy every day, dizziness has not changed recently.   Patient self-monitors blood  pressure every few days, but does not remember the last reading.   Cardiology: Dr. Hirsch  - per last MTM visit, cardiology referring to PCP and MTM to manage blood pressure     Hyperlipidemia   /Hx pontine stroke  Atorvastatin 80 mg daily   Aspirin 81 mg daily (per neurology)   Patient reports muscle pains bilaterally in her legs, but points to R knee. States that this has been present for a long time and her doctors are already aware of this.    The ASCVD Risk score (Doris DK, et al., 2019) failed to calculate for the following reasons:    Risk score cannot be calculated because patient has a medical history suggesting prior/existing ASCVD    Asthma   /COPD/hypereosinophilia  Prednisone taper - started 3/12, finished course   Montelukast 10 mg daily at bedtime  Breo Ellipta 200/25mcg 1 puff daily at night  Spiriva 2.5 mcg 2 puffs daily at night - inhaler brought into clinic filled 2/3, dose counter still at 60 (full)  Albuterol HFA every 4 hours daily as needed - using every day    Albuterol nebs every 6 hours as needed - using 3 times per day   Duoneb 0.5-2.5mg/3mL neb 4 times daily - using 3 times per day   Trezpire 210mg q4 weeks - patient has not received in about a year. Daughter-in-law reported the medication was stopped by allergiest as it was not working for the patient. Patient did think this medication helped with her cough and wants again. Removed from medication list for now.   Confirmed that patient rinses mouth after using inhalers.   Breathing has been the same.   Pulmonologist: [facility], Tamra Mohr.   Allergy & immunology: [facility], Dr. Elizabeth Marie, [phone] - appointment in May     Constipation   Miralax 17 g daily   Senna 8.6mg daily PRN- using 1 every day   Having a bowel movement 1 time per day.      GERD    Esomeprazole 40 mg daily in the morning  Sucralfate 1g susp 4 times daily - using as needed for heartburn - using daily  Tums 1 tablet twice daily PRN -  using daily   Nortriptyline 10mg daily (to help with swallowing sensation) - taking, has not noticed any differences   Managed by gastroenterology.   Reports well controlled symptoms with current regimen.  Patient previously taking omeprazole which was switched to esomeprazole per GI specialist.  Per GI: trialing nortriptyline x 3 months (around end of May 2025)     Headache   Amitriptyline 50 mg daily at bedtime  Emgality 120mg injection every 28 days - patient reported injection was stopped due to insurance coverage, last injection was 3-4 months ago, removed from medication list   Seeing neurologist, Dr. Prescott.   Patient reports she has had a headaches every day for past month. Said headaches have become more frequent since she stopped the emgality. Used to get headaches about twice per week.      Mental Health / Depression  Fluoxetine 20 mg daily   No concerns. Reports her mood had been good.      Supplements   Vitamin D 400 international units daily  States that she has been taking this for mouth sores and finds it helpful.    Pericarditis    Colchicine 0.6 mg daily (started 7/24)  Reports improvement of chest pain.   Rx per Dr. Hirsch    Pain/Leg cramps  Acetaminophen 500mg Q6h PRN pain - only has 325mg tablet at home  Acetaminophen 325mg q6h prn - getting OTC, 3-4 times daily - takes 2 tablet at a time, daughter in law reported they make sure to wait at least 6 hours before giving patient another dose  Gabapentin 600 mg three times daily  Ferrous sulfate 325 daily - per juan for leg cramps  Diclofenac 1% gel 4g 4 times daily - and this helps a lot   Lidocaine 4% patch daily   Tizanidine 2mg 3 times daily as needed   Thinks that her pain right now is tolerable.      Dizziness  Meclizine 25mg 3 times daily PRN - using almost every day   Rx from Dr. Prescott (neurology)   Reported that it has helped a little bit, but still experiences dizziness daily.     Cough  Robitussin 200mg liquid q6h prn   Cough  resolved, no longer using. Removed from med list.     Today's Vitals: /74   Pulse 77   Wt 121 lb 14.4 oz (55.3 kg)   SpO2 95%   BMI 23.81 kg/m    ----------------      I spent 70 minutes with this patient today. All changes were made via collaborative practice agreement with Adrien Cardoza MD.     A summary of these recommendations was given to the patient.    Andra Hernández, PharmD   Medication Therapy Management Pharmacy Resident    Preceptor cosignature: Patient was seen independently by Dr. Hernández. I have reviewed the assessment and plan. Malu Gil, PharmD, BCACP     Medication Therapy Recommendations  COPD (chronic obstructive pulmonary disease) (H)   1 Current Medication: tiotropium (SPIRIVA RESPIMAT) 2.5 MCG/ACT inhaler   Current Medication Sig: INHALE 2 PUFFS INTO THE LUNGS DAILY   Rationale: Does not understand instructions - Adherence - Adherence   Recommendation: Provide Education   Status: Patient Agreed - Adherence/Education   Identified Date: 3/25/2025 Completed Date: 3/25/2025         Essential hypertension   1 Current Medication: metoprolol tartrate (LOPRESSOR) 25 MG tablet (Discontinued)   Current Medication Sig: TAKE 1 TABLET (25 MG TOTAL) BY MOUTH 2 (TWO) TIMES A DAY FOR BLOOD PRESSURE   Rationale: Dose too high - Dosage too high - Safety   Recommendation: Decrease Dose   Status: Accepted per CPA   Identified Date: 3/25/2025 Completed Date: 3/25/2025         Type 2 diabetes mellitus treated with insulin (H)   1 Current Medication: insulin glargine (LANTUS PEN) 100 UNIT/ML pen   Current Medication Sig: Inject 40 Units subcutaneously every morning.   Rationale: Synergistic therapy - Needs additional medication therapy - Indication   Recommendation: Start Medication - Ozempic (0.25 or 0.5 MG/DOSE) 2 MG/1.5ML Sopn   Status: Accepted per CPA   Identified Date: 3/25/2025 Completed Date: 3/25/2025         2 Current Medication: insulin glargine (LANTUS PEN) 100 UNIT/ML pen   Current  Medication Sig: Inject 40 Units subcutaneously every morning.   Rationale: Dose too low - Dosage too low - Effectiveness   Recommendation: Increase Dose   Status: Accepted per CPA   Identified Date: 3/25/2025 Completed Date: 3/25/2025

## 2025-03-25 ENCOUNTER — OFFICE VISIT (OUTPATIENT)
Dept: PHARMACY | Facility: CLINIC | Age: 55
End: 2025-03-25
Payer: COMMERCIAL

## 2025-03-25 VITALS
OXYGEN SATURATION: 95 % | HEART RATE: 77 BPM | DIASTOLIC BLOOD PRESSURE: 74 MMHG | WEIGHT: 121.9 LBS | SYSTOLIC BLOOD PRESSURE: 112 MMHG | BODY MASS INDEX: 23.81 KG/M2

## 2025-03-25 DIAGNOSIS — I25.10 CORONARY ARTERY DISEASE INVOLVING NATIVE CORONARY ARTERY OF NATIVE HEART WITHOUT ANGINA PECTORIS: ICD-10-CM

## 2025-03-25 DIAGNOSIS — R05.1 ACUTE COUGH: ICD-10-CM

## 2025-03-25 DIAGNOSIS — J44.9 CHRONIC OBSTRUCTIVE PULMONARY DISEASE, UNSPECIFIED COPD TYPE (H): Chronic | ICD-10-CM

## 2025-03-25 DIAGNOSIS — E78.5 HYPERLIPIDEMIA, UNSPECIFIED HYPERLIPIDEMIA TYPE: ICD-10-CM

## 2025-03-25 DIAGNOSIS — K59.00 CONSTIPATION, UNSPECIFIED CONSTIPATION TYPE: ICD-10-CM

## 2025-03-25 DIAGNOSIS — E11.9 TYPE 2 DIABETES MELLITUS TREATED WITH INSULIN (H): Primary | ICD-10-CM

## 2025-03-25 DIAGNOSIS — R42 DIZZINESS: ICD-10-CM

## 2025-03-25 DIAGNOSIS — Z79.4 TYPE 2 DIABETES MELLITUS TREATED WITH INSULIN (H): Primary | ICD-10-CM

## 2025-03-25 DIAGNOSIS — M25.562 PAIN IN BOTH KNEES, UNSPECIFIED CHRONICITY: ICD-10-CM

## 2025-03-25 DIAGNOSIS — R05.9 COUGH: ICD-10-CM

## 2025-03-25 DIAGNOSIS — J45.50 SEVERE PERSISTENT ASTHMA, UNSPECIFIED WHETHER COMPLICATED (H): ICD-10-CM

## 2025-03-25 DIAGNOSIS — I10 ESSENTIAL HYPERTENSION: ICD-10-CM

## 2025-03-25 DIAGNOSIS — I31.9 PERICARDITIS, UNSPECIFIED CHRONICITY, UNSPECIFIED TYPE: ICD-10-CM

## 2025-03-25 DIAGNOSIS — K21.9 GASTROESOPHAGEAL REFLUX DISEASE WITHOUT ESOPHAGITIS: ICD-10-CM

## 2025-03-25 DIAGNOSIS — M25.561 PAIN IN BOTH KNEES, UNSPECIFIED CHRONICITY: ICD-10-CM

## 2025-03-25 DIAGNOSIS — Z78.9 TAKES DIETARY SUPPLEMENTS: ICD-10-CM

## 2025-03-25 PROCEDURE — 3074F SYST BP LT 130 MM HG: CPT

## 2025-03-25 PROCEDURE — 99605 MTMS BY PHARM NP 15 MIN: CPT

## 2025-03-25 PROCEDURE — 3078F DIAST BP <80 MM HG: CPT

## 2025-03-25 PROCEDURE — 99607 MTMS BY PHARM ADDL 15 MIN: CPT

## 2025-03-25 RX ORDER — ACETAMINOPHEN 325 MG/1
650 TABLET ORAL EVERY 6 HOURS PRN
COMMUNITY

## 2025-03-25 RX ORDER — METOPROLOL TARTRATE 25 MG/1
TABLET, FILM COATED ORAL
Qty: 45 TABLET | Refills: 3 | Status: SHIPPED | OUTPATIENT
Start: 2025-03-25

## 2025-03-25 RX ORDER — NORTRIPTYLINE HYDROCHLORIDE 10 MG/1
10 CAPSULE ORAL AT BEDTIME
COMMUNITY
Start: 2025-02-27

## 2025-03-25 NOTE — PATIENT INSTRUCTIONS
"  Recommendations from today's MTM visit:                                                       START ozempic 0.25mg weekly    Use novolog 14 units before meals - use 5-10 minutes before meals  Use Spiriva inhaler 2 puffs every night - make sure to twist the bottom while the mouthpiece is in your mouth   TUMS can be used twice daily as needed for heartburn  Sucralfate was prescribed to be taken 4 times daily   DECREASE metoprolol to 12.5mg (one-half tablet) twice daily     Follow-up: Return in 10 weeks (on 6/3/2025) for with PharmD.      It was great speaking with you today.  I value your experience and would be very thankful for your time in providing feedback in our clinic survey. In the next few days, you may receive an email or text message from [employer] with a link to a survey related to your  clinical pharmacist.\"      To schedule another MTM appointment, please call the clinic directly or you may call the MTM scheduling line at [phone].      My Clinical Pharmacist's contact information:                                                       Please feel free to contact me with any questions or concerns you have.      Andra Hernández, PharmD   Medication Therapy Management Pharmacy Resident      "

## 2025-03-25 NOTE — Clinical Note
Idalia,   I saw Kulwant today in clinic and I see that you recently started her on nortriptyline. She is currently also on amitriptyline for headaches which was prescribed by  neurology. I have reached out to her neurologist Dr. Prescott as well, but I was wondering what your thoughts are on having her on both medications or if there is something other than nortriptyline that she could try. I do see she is seeing you in May, but I would not recommend her staying on both medications until that time. Please let me know what you think and if you have any questions for me!   Thanks,  Andra Hernández, PharmD  Medication Therapy Management Pharmacy Resident  37.1

## 2025-03-25 NOTE — Clinical Note
Idalia Prescott,   I saw Kulwant today in clinic and she is on amitriptyline for headaches but was recently started on nortriptyline by Stephanie Ramsey at [facility]. I have reached out to her as well, but I would not recommend this patient stay on both medications and am wondering if there is an alternative to amitriptyline that you would suggest. Kulwant does have an appointment coming up with you on 4/22, but just wondering if her medication should be changed before then. Let me know!   Thanks,  Andra Hernández, PharmD  Medication Therapy Management Pharmacy Resident

## 2025-03-25 NOTE — Clinical Note
Idalia Cardoza,   I saw Kulwant today in clinic. Biggest concern today is she was started on nortriptyline by GI but is already on amitriptyline. I will be reaching about to both GI and neurology about this issue. Her blood sugars are elevated so I also increased her lantus and started her on ozempic. She continues to be dizzy and her BP has been well at goal so I lowered her metoprolol to see if that helps with the dizziness. No other med changes today. I did do a lot of inhaler teaching as she was not using spiriva correctly. Let me know if you have any questions!   Thanks,  Andra

## 2025-03-26 ENCOUNTER — TELEPHONE (OUTPATIENT)
Dept: FAMILY MEDICINE | Facility: CLINIC | Age: 55
End: 2025-03-26
Payer: COMMERCIAL

## 2025-03-26 NOTE — TELEPHONE ENCOUNTER
Andra Hernández, [facility]  Adrien Cardoza MD Hello Dr. Za,    I saw Kulwant today in clinic. Biggest concern today is she was started on nortriptyline by GI but is already on amitriptyline. I will be reaching about to both GI and neurology about this issue. Her blood sugars are elevated so I also increased her lantus and started her on ozempic. She continues to be dizzy and her BP has been well at goal so I lowered her metoprolol to see if that helps with the dizziness. No other med changes today. I did do a lot of inhaler teaching as she was not using spiriva correctly. Let me know if you have any questions!    Thanks,  Andra

## 2025-03-27 ENCOUNTER — TRANSFERRED RECORDS (OUTPATIENT)
Dept: HEALTH INFORMATION MANAGEMENT | Facility: CLINIC | Age: 55
End: 2025-03-27
Payer: COMMERCIAL

## 2025-03-27 ENCOUNTER — TELEPHONE (OUTPATIENT)
Dept: FAMILY MEDICINE | Facility: CLINIC | Age: 55
End: 2025-03-27
Payer: COMMERCIAL

## 2025-03-27 LAB — RETINOPATHY: NEGATIVE

## 2025-03-27 NOTE — TELEPHONE ENCOUNTER
Andra Hernández, [facility]  Adrien Cardoza MD Hello Dr. Za,    I saw Kulwant today in clinic. Biggest concern today is she was started on nortriptyline by GI but is already on amitriptyline. I will be reaching about to both GI and neurology about this issue. Her blood sugars are elevated so I also increased her lantus and started her on ozempic. She continues to be dizzy and her BP has been well at goal so I lowered her metoprolol to see if that helps with the dizziness. No other med changes today. I did do a lot of inhaler teaching as she was not using spiriva correctly. Let me know if you have any questions!    Thanks,  Andra   Unable to get IV, nurse to attempt with US shortly

## 2025-03-31 NOTE — TELEPHONE ENCOUNTER
Routing refill request to provider for review/approval because:  Drug not on the [facility] refill protocol     Last Written Prescription Date:  5/12/2022  Last Fill Quantity: 200,  # refills: 0   Last office visit provider:  5/12/2022     Requested Prescriptions   Pending Prescriptions Disp Refills     dextromethorphan (DELSYM) 30 MG/5ML liquid [Pharmacy Med Name: DEXTROMETHORPHAN ER 30 MG/5 30 Suspension Extended Release] 178 mL 0     Sig: TAKE 10 MLS (60 MG) BY MOUTH 2 TIMES DAILY AS NEEDED FOR COUGH       There is no refill protocol information for this order          Rowan Rivas, SCOTT 07/03/22 7:08 AM   no

## 2025-04-05 ENCOUNTER — HOSPITAL ENCOUNTER (EMERGENCY)
Facility: HOSPITAL | Age: 55
Discharge: HOME | End: 2025-04-05
Attending: EMERGENCY MEDICINE | Admitting: EMERGENCY MEDICINE
Payer: COMMERCIAL

## 2025-04-05 ENCOUNTER — APPOINTMENT (OUTPATIENT)
Dept: RADIOLOGY | Facility: HOSPITAL | Age: 55
End: 2025-04-05
Attending: EMERGENCY MEDICINE
Payer: COMMERCIAL

## 2025-04-05 VITALS
DIASTOLIC BLOOD PRESSURE: 65 MMHG | TEMPERATURE: 97 F | HEART RATE: 108 BPM | OXYGEN SATURATION: 95 % | RESPIRATION RATE: 22 BRPM | SYSTOLIC BLOOD PRESSURE: 125 MMHG

## 2025-04-05 DIAGNOSIS — J45.901 EXACERBATION OF ASTHMA, UNSPECIFIED ASTHMA SEVERITY, UNSPECIFIED WHETHER PERSISTENT: ICD-10-CM

## 2025-04-05 LAB
ANION GAP SERPL CALCULATED.3IONS-SCNC: 13 MMOL/L (ref 7–15)
BASOPHILS # BLD AUTO: 0 10E3/UL (ref 0–0.2)
BASOPHILS NFR BLD AUTO: 1 %
BUN SERPL-MCNC: 9.1 MG/DL (ref 6–20)
CALCIUM SERPL-MCNC: 9.5 MG/DL (ref 8.8–10.4)
CHLORIDE SERPL-SCNC: 103 MMOL/L (ref 98–107)
CREAT SERPL-MCNC: 0.44 MG/DL (ref 0.51–0.95)
D DIMER PPP FEU-MCNC: <0.27 UG/ML FEU (ref 0–0.5)
EGFRCR SERPLBLD CKD-EPI 2021: >90 ML/MIN/1.73M2
EOSINOPHIL # BLD AUTO: 0.5 10E3/UL (ref 0–0.7)
EOSINOPHIL NFR BLD AUTO: 8 %
ERYTHROCYTE [DISTWIDTH] IN BLOOD BY AUTOMATED COUNT: 14.3 % (ref 10–15)
FLUAV RNA SPEC QL NAA+PROBE: NEGATIVE
FLUBV RNA RESP QL NAA+PROBE: NEGATIVE
GLUCOSE SERPL-MCNC: 303 MG/DL (ref 70–99)
HCO3 SERPL-SCNC: 23 MMOL/L (ref 22–29)
HCT VFR BLD AUTO: 37.4 % (ref 35–47)
HGB BLD-MCNC: 12.1 G/DL (ref 11.7–15.7)
IMM GRANULOCYTES # BLD: 0 10E3/UL
IMM GRANULOCYTES NFR BLD: 1 %
LYMPHOCYTES # BLD AUTO: 1.6 10E3/UL (ref 0.8–5.3)
LYMPHOCYTES NFR BLD AUTO: 27 %
MCH RBC QN AUTO: 28.5 PG (ref 26.5–33)
MCHC RBC AUTO-ENTMCNC: 32.4 G/DL (ref 31.5–36.5)
MCV RBC AUTO: 88 FL (ref 78–100)
MONOCYTES # BLD AUTO: 0.6 10E3/UL (ref 0–1.3)
MONOCYTES NFR BLD AUTO: 11 %
NEUTROPHILS # BLD AUTO: 3.1 10E3/UL (ref 1.6–8.3)
NEUTROPHILS NFR BLD AUTO: 53 %
NRBC # BLD AUTO: 0 10E3/UL
NRBC BLD AUTO-RTO: 0 /100
NT-PROBNP SERPL-MCNC: 40 PG/ML (ref 0–900)
PLATELET # BLD AUTO: 182 10E3/UL (ref 150–450)
POTASSIUM SERPL-SCNC: 4.1 MMOL/L (ref 3.4–5.3)
RBC # BLD AUTO: 4.24 10E6/UL (ref 3.8–5.2)
RSV RNA SPEC NAA+PROBE: NEGATIVE
SARS-COV-2 RNA RESP QL NAA+PROBE: NEGATIVE
SODIUM SERPL-SCNC: 139 MMOL/L (ref 135–145)
TROPONIN T SERPL HS-MCNC: <6 NG/L
WBC # BLD AUTO: 5.8 10E3/UL (ref 4–11)

## 2025-04-05 PROCEDURE — 85379 FIBRIN DEGRADATION QUANT: CPT | Performed by: EMERGENCY MEDICINE

## 2025-04-05 PROCEDURE — 80048 BASIC METABOLIC PNL TOTAL CA: CPT | Performed by: EMERGENCY MEDICINE

## 2025-04-05 PROCEDURE — 99285 EMERGENCY DEPT VISIT HI MDM: CPT | Mod: 25

## 2025-04-05 PROCEDURE — 94640 AIRWAY INHALATION TREATMENT: CPT

## 2025-04-05 PROCEDURE — 71046 X-RAY EXAM CHEST 2 VIEWS: CPT

## 2025-04-05 PROCEDURE — 93005 ELECTROCARDIOGRAM TRACING: CPT | Performed by: EMERGENCY MEDICINE

## 2025-04-05 PROCEDURE — 82435 ASSAY OF BLOOD CHLORIDE: CPT | Performed by: EMERGENCY MEDICINE

## 2025-04-05 PROCEDURE — 83880 ASSAY OF NATRIURETIC PEPTIDE: CPT | Performed by: EMERGENCY MEDICINE

## 2025-04-05 PROCEDURE — 250N000009 HC RX 250: Performed by: EMERGENCY MEDICINE

## 2025-04-05 PROCEDURE — 250N000011 HC RX IP 250 OP 636: Mod: JZ | Performed by: EMERGENCY MEDICINE

## 2025-04-05 PROCEDURE — 96365 THER/PROPH/DIAG IV INF INIT: CPT

## 2025-04-05 PROCEDURE — 85025 COMPLETE CBC W/AUTO DIFF WBC: CPT | Performed by: EMERGENCY MEDICINE

## 2025-04-05 PROCEDURE — 36415 COLL VENOUS BLD VENIPUNCTURE: CPT | Performed by: EMERGENCY MEDICINE

## 2025-04-05 PROCEDURE — 96375 TX/PRO/DX INJ NEW DRUG ADDON: CPT

## 2025-04-05 PROCEDURE — 87637 SARSCOV2&INF A&B&RSV AMP PRB: CPT | Performed by: EMERGENCY MEDICINE

## 2025-04-05 PROCEDURE — 84484 ASSAY OF TROPONIN QUANT: CPT | Performed by: EMERGENCY MEDICINE

## 2025-04-05 RX ORDER — METHYLPREDNISOLONE SODIUM SUCCINATE 125 MG/2ML
125 INJECTION INTRAMUSCULAR; INTRAVENOUS ONCE
Status: COMPLETED | OUTPATIENT
Start: 2025-04-05 | End: 2025-04-05

## 2025-04-05 RX ORDER — IPRATROPIUM BROMIDE AND ALBUTEROL SULFATE 2.5; .5 MG/3ML; MG/3ML
3 SOLUTION RESPIRATORY (INHALATION)
Status: COMPLETED | OUTPATIENT
Start: 2025-04-05 | End: 2025-04-05

## 2025-04-05 RX ORDER — DEXTROMETHORPHAN POLISTIREX 30 MG/5ML
60 SUSPENSION ORAL 2 TIMES DAILY
Qty: 89 ML | Refills: 0 | Status: SHIPPED | OUTPATIENT
Start: 2025-04-05

## 2025-04-05 RX ORDER — PREDNISONE 20 MG/1
TABLET ORAL
Qty: 10 TABLET | Refills: 0 | Status: SHIPPED | OUTPATIENT
Start: 2025-04-05

## 2025-04-05 RX ORDER — KETOROLAC TROMETHAMINE 15 MG/ML
15 INJECTION, SOLUTION INTRAMUSCULAR; INTRAVENOUS ONCE
Status: COMPLETED | OUTPATIENT
Start: 2025-04-05 | End: 2025-04-05

## 2025-04-05 RX ORDER — MAGNESIUM SULFATE HEPTAHYDRATE 40 MG/ML
2 INJECTION, SOLUTION INTRAVENOUS ONCE
Status: COMPLETED | OUTPATIENT
Start: 2025-04-05 | End: 2025-04-05

## 2025-04-05 RX ADMIN — MAGNESIUM SULFATE HEPTAHYDRATE 2 G: 40 INJECTION, SOLUTION INTRAVENOUS at 08:00

## 2025-04-05 RX ADMIN — KETOROLAC TROMETHAMINE 15 MG: 15 INJECTION, SOLUTION INTRAMUSCULAR; INTRAVENOUS at 07:53

## 2025-04-05 RX ADMIN — IPRATROPIUM BROMIDE AND ALBUTEROL SULFATE 3 ML: .5; 3 SOLUTION RESPIRATORY (INHALATION) at 08:18

## 2025-04-05 RX ADMIN — METHYLPREDNISOLONE SODIUM SUCCINATE 125 MG: 125 INJECTION, POWDER, FOR SOLUTION INTRAMUSCULAR; INTRAVENOUS at 07:52

## 2025-04-05 RX ADMIN — IPRATROPIUM BROMIDE AND ALBUTEROL SULFATE 3 ML: .5; 3 SOLUTION RESPIRATORY (INHALATION) at 08:27

## 2025-04-05 RX ADMIN — IPRATROPIUM BROMIDE AND ALBUTEROL SULFATE 3 ML: .5; 3 SOLUTION RESPIRATORY (INHALATION) at 07:50

## 2025-04-05 ASSESSMENT — ACTIVITIES OF DAILY LIVING (ADL)
ADLS_ACUITY_SCORE: 59

## 2025-04-05 NOTE — ED NOTES
Discharge paperwork, follow up care and prescriptions discussed with pt and family at bedside. They verbalized understanding and have no questions.

## 2025-04-05 NOTE — ED PROVIDER NOTES
EMERGENCY DEPARTMENT ENCOUNTER      NAME: Kulwant Amin  AGE: 55 year old female  YOB: 1970  MRN: [id number]  EVALUATION DATE & TIME: 4/5/2025  7:09 AM    PCP: Adrien Cardoza    ED PROVIDER: Mervin Yeh MD      Chief Complaint   Patient presents with    Leg Pain    Shortness of Breath         FINAL IMPRESSION:  1. Exacerbation of asthma, unspecified asthma severity, unspecified whether persistent          ED COURSE & MEDICAL DECISION MAKING:    Pertinent Labs & Imaging studies reviewed. (See chart for details)  55 year old female presents to the Emergency Department for evaluation of shortness of breath leg pain    Initial vital signs notable for tachypnea, tachycardia, normal oxygen level minimally elevated blood pressure        ED Course as of 04/05/25 1112   Sat Apr 05, 2025   0710 Reviewed note from March 26, 2025 seen by pulmonary for shortness of breath.  Needed to stop during 6-minute walk and only able to walk 18 feet.  Referred to ENT as well as speech.  Medications were changed.  History of asthma and vocal cord dysfunction previous stroke   0710 Per chart review patient had multiple visits for shortness of breath and asthma exacerbation   0711 Care complicated by pulmonary disease   0736 Patient and she is in no distress.  Does have diffuse wheezing and mild increased work of breathing.  Has not had a DuoNeb yet today will give DuoNeb and Solu-Medrol and magnesium   0737 History asthma most likely asthma exacerbation but also consider bronchitis, PE, ACS, pneumonia, COVID, CHF, GERD, postnasal drainage   0737 Plan for influenza proBNP troponin D-dimer BNP CBC   0836 N-Terminal Pro BNP Inpatient: 40  CHF less likely   0836 Troponin T, High Sensitivity: <6  ACS unlikely   0836 Glucose(!): 303  Had known Diabetes but not in DKA   0836 WBC: 5.8   0837 Hemoglobin: 12.1   0837 Platelet Count: 182   0837 D-Dimer Quantitative: <0.27  CTA PE not indicated, ultrasounds of the legs not indicated since  highly unlikely DVT or PE   0841 Influenza A: Negative   0841 Influenza B: Negative   0841 Resp Syncytial Virus: Negative   0841 SARS CoV2 PCR: Negative   1041 I independently reviewed chest xray and I do not appreciate any pneumonia   1042 Radiologist comment.  Minimal basilar scarring versus atelectasis   1112 Recheck with readings improved.  Feels much better.  Patient request to be discharged.  Still has some faint wheezing.  Plan for discharge home with prednisone and Delsym       Medical Decision Making  I reviewed the EMR: Outpatient Record: Pulmonary record as noted above  Care impacted by Chronic Lung Disease  I considered additional work up, including CTA PE, but deferred negative D-dimer  I independently interpreted the chest x-ray and note no pneumonia. See radiology report for final interpretation.  Discharge. I prescribed additional prescription strength medication(s) as charted. N/A.    MIPS (CTPE, Dental pain, Collins, Sinusitis, Asthma/COPD, Head Trauma): Not Applicable                At the conclusion of the encounter I discussed the results of all of the tests and the disposition. The questions were answered. The patient or family acknowledged understanding and was agreeable with the care plan.       Voice recognition software used for this note,  any grammatical or spelling errors are non-intentional. Please contact the author of this note directly if you are in need of any clarification.      MEDICATIONS GIVEN IN THE EMERGENCY:  Medications   ipratropium - albuterol 0.5 mg/2.5 mg/3 mL (DUONEB) neb solution 3 mL (3 mLs Nebulization $Given 4/5/25 0827)   methylPREDNISolone Na Suc (solu-MEDROL) injection 125 mg (125 mg Intravenous $Given 4/5/25 0752)   magnesium sulfate 2 g in 50 mL sterile water intermittent infusion (0 g Intravenous Stopped 4/5/25 0830)   ketorolac (TORADOL) injection 15 mg (15 mg Intravenous $Given 4/5/25 0753)       NEW PRESCRIPTIONS STARTED AT TODAY'S ER VISIT  New Prescriptions     DEXTROMETHORPHAN (DELSYM) 30 MG/5ML LIQUID    Take 10 mLs (60 mg) by mouth 2 times daily.    PREDNISONE (DELTASONE) 20 MG TABLET    Take two tablets (= 40mg) each day for 5 (five) days          =================================================================    TRIAGE ASSESSMENT:  Pt states 2 days of worsening leg pain and shortness of breath.  Pt denies chest pain.             HPI    Patient information was obtained from: Patient's, patient's daughter at bedside, chart review    Use of : Nathaniel Pepito Eloisa is a 55 year old female with a pertinent history of asthma, COPD, anxiety, latent TB previous stroke who presents to this ED for evaluation of worsening shortness of breath also reports body aches including leg pain and headache    Patient reports her symptoms have worsened since she was last seen by pulmonary on 26 March    Has not been able to see the ENT as recommended but is scheduled to see ENT in June.  Does reports she chokes when she eats but says she was cleared by speech when she saw GI recent    Is on omeprazole.  Denies chronic nasal congestion or sneezing    No fever.  No sick contact    Bilateral diffuse leg pain    Chronic daily every day headache    Went saw pulmonary they recommended she start budesonide and haled, Symbicort, DuoNeb says she has transition to these medciations      REVIEW OF SYSTEMS   Review of Systems     PAST MEDICAL HISTORY:  Past Medical History:   Diagnosis Date    Acute asthma exacerbation 01/06/2020    Anxiety     Arthritis     Asthma exacerbation 11/19/2015    Chronic obstructive pulmonary disease, unspecified COPD type (H)     COPD (chronic obstructive pulmonary disease) (H)     Coronary artery disease due to lipid rich plaque     Depression     Diabetes (H)     Epigastric pain 12/15/2021    Essential hypertension     GERD (gastroesophageal reflux disease)     Infection due to 2019 novel coronavirus 01/03/2022    positive with  COVID-19 on January 3, 2022    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    Microcytic anemia 11/17/2019    Motion sickness     PONV (postoperative nausea and vomiting)     S/P coronary artery stent placement 02/02/2018    Sleep apnea     TB lung, latent     9 mos INH       PAST SURGICAL HISTORY:  Past Surgical History:   Procedure Laterality Date    CORONARY STENT PLACEMENT  2018    CV CORONARY ANGIOGRAM N/A 1/25/2018    Procedure: Coronary Angiogram;  Surgeon: Angelo Serrano MD;  Location: [facility] Cath Lab;  Service:     CV CORONARY ANGIOGRAM N/A 5/5/2022    Procedure: Coronary Angiogram;  Surgeon: Irwin Cano MD;  Location: [facility] CV    CV CORONARY ANGIOGRAM  5/5/2022    Procedure: ;  Surgeon: Irwin Cano MD;  Location: [facility] CV    ESOPHAGOSCOPY, GASTROSCOPY, DUODENOSCOPY (EGD), COMBINED N/A 11/21/2024    Procedure: ESOPHAGOGASTRODUODENOSCOPY with BIOPSY;  Surgeon: Gomez Bella MD;  Location: [facility] OR    MD ESOPHAGOGASTRODUODENOSCOPY TRANSORAL DIAGNOSTIC N/A 12/10/2018    Procedure: ESOPHAGOGASTRODUODENOSCOPY (EGD);  Surgeon: Eddie Renteria MD;  Location: [facility];  Service: Gastroenterology    MD ESOPHAGOGASTRODUODENOSCOPY TRANSORAL DIAGNOSTIC N/A 12/3/2020    Procedure: ESOPHAGOGASTRODUODENOSCOPY (EGD) with biospies ;  Surgeon: Avi Crow MD;  Location: [facility] GI;  Service: Gastroenterology    [facility] COLONOSCOPY W/WO BRUSH/WASH N/A 12/10/2018    Procedure: COLONOSCOPY with polypectomy using biopsy forceps;  Surgeon: Eddie Renteria MD;  Location: [facility] GI;  Service: Gastroenterology           CURRENT MEDICATIONS:    acetaminophen (TYLENOL) 325 MG tablet  albuterol (PROAIR HFA/PROVENTIL HFA/VENTOLIN HFA) 108 (90 Base) MCG/ACT inhaler  albuterol (PROVENTIL) (2.5 MG/3ML) 0.083% neb solution  amitriptyline (ELAVIL) 50 MG tablet  aspirin 81 MG EC tablet  atorvastatin (LIPITOR) 80 MG tablet  BD EZ U/F 32G X 4 MM insulin pen needle  calcium  carbonate (TUMS) 500 MG chewable tablet  colchicine (COLCRYS) 0.6 MG tablet  Continuous Glucose  (FREESTYLE MONICA 14 DAY READER) MICHAEL  Continuous Glucose Sensor (FREESTYLE MONICA 2 SENSOR) MIS  CONTOUR NEXT TEST test strip  dextromethorphan (DELSYM) 30 MG/5ML liquid  diclofenac (VOLTAREN) 1 % topical gel  ferrous sulfate (FEROSUL) 325 (65 Fe) MG tablet  FLUoxetine (PROZAC) 20 MG capsule  fluticasone-vilanterol (BREO ELLIPTA) 200-25 MCG/ACT inhaler  gabapentin (NEURONTIN) 600 MG tablet  Global Inject Ease Lancets 30G MISC  insulin aspart (NOVOLOG PEN) 100 UNIT/ML pen  insulin glargine (LANTUS PEN) 100 UNIT/ML pen  ipratropium - albuterol 0.5 mg/2.5 mg/3 mL (DUONEB) 0.5-2.5 (3) MG/3ML neb solution  Lidocaine (LIDOCARE) 4 % Patch  lisinopril (ZESTRIL) 5 MG tablet  meclizine (ANTIVERT) 25 MG tablet  metFORMIN (GLUCOPHAGE XR) 500 MG 24 hr tablet  metoprolol tartrate (LOPRESSOR) 25 MG tablet  montelukast (SINGULAIR) 10 MG tablet  NEXIUM 40 MG DR capsule  polyethylene glycol (MIRALAX) 17 GM/Dose powder  predniSONE (DELTASONE) 20 MG tablet  semaglutide (OZEMPIC) 2 MG/3ML pen  sennosides (SENOKOT) 8.6 MG tablet  sucralfate (CARAFATE) 1 GM/10ML suspension  tiotropium (SPIRIVA RESPIMAT) 2.5 MCG/ACT inhaler  tiZANidine (ZANAFLEX) 2 MG tablet  vitamin D3 (CHOLECALCIFEROL) 10 MCG (400 UNIT) capsule        ALLERGIES:  No Known Allergies    FAMILY HISTORY:  Family History   Problem Relation Age of Onset    Other - See Comments Mother          of an intestinal problem    Ulcers Father          of gastritis    Breast Cancer No family hx of     Ovarian Cancer No family hx of     Colon Cancer No family hx of        SOCIAL HISTORY:   Social History     Socioeconomic History    Marital status:    Tobacco Use    Smoking status: Former     Current packs/day: 0.00     Average packs/day: 1 pack/day for 30.0 years (30.0 ttl pk-yrs)     Types: Cigarettes     Start date: 1973     Quit date: 2003     Years since  quittin.2     Passive exposure: Never    Smokeless tobacco: Never    Tobacco comments:     No passive exposure   Vaping Use    Vaping status: Never Used   Substance and Sexual Activity    Alcohol use: No    Drug use: No    Sexual activity: Yes     Partners: Male   Social History Narrative    2017 The patient lives with her daughter-in-law (who is present), , son, and 2 grandchildren (total of 6 people). Immigrant.     Social Drivers of Health     Financial Resource Strain: Low Risk  (2024)    Financial Resource Strain     Within the past 12 months, have you or your family members you live with been unable to get utilities (heat, electricity) when it was really needed?: No   Food Insecurity: Low Risk  (2024)    Food Insecurity     Within the past 12 months, did you worry that your food would run out before you got money to buy more?: No     Within the past 12 months, did the food you bought just not last and you didn t have money to get more?: No   Transportation Needs: Low Risk  (2024)    Transportation Needs     Within the past 12 months, has lack of transportation kept you from medical appointments, getting your medicines, non-medical meetings or appointments, work, or from getting things that you need?: No   Interpersonal Safety: Low Risk  (2024)    Interpersonal Safety     Do you feel physically and emotionally safe where you currently live?: Yes     Within the past 12 months, have you been hit, slapped, kicked or otherwise physically hurt by someone?: No     Within the past 12 months, have you been humiliated or emotionally abused in other ways by your partner or ex-partner?: No   Recent Concern: Interpersonal Safety - High Risk (2024)    Interpersonal Safety     Do you feel physically and emotionally safe where you currently live?: No     Within the past 12 months, have you been hit, slapped, kicked or otherwise physically hurt by someone?: No     Within the past 12  months, have you been humiliated or emotionally abused in other ways by your partner or ex-partner?: No   Housing Stability: Low Risk  (9/30/2024)    Housing Stability     Do you have housing? : Yes     Are you worried about losing your housing?: No       VITALS:  /66   Pulse 109   Temp 97  F (36.1  C) (Temporal)   Resp 19   SpO2 95%     PHYSICAL EXAM      Vitals: /66   Pulse 109   Temp 97  F (36.1  C) (Temporal)   Resp 19   SpO2 95%   General: Appears in no acute distress, awake, alert, interactive.  Eyes: Conjunctivae non-injected. Sclera anicteric.  HENT: Atraumatic.  No tripoding, no drooling, no stridor  Neck: Supple.  Respiratory/Chest: Respiration unlabored.  Increased work of breathing mild, diffuse wheezing, speaks in complete sentences  Abdomen: non distended  Musculoskeletal: Normal extremities. No edema or erythema.  No calf tenderness and legs are symmetric  Skin: Normal color. No rash or diaphoresis.  Neurologic: Face symmetric, moves all extremities spontaneously. Speech clear.  Psychiatric: Oriented to person, place, and time. Affect appropriate.    LAB:  All pertinent labs reviewed and interpreted.  Results for orders placed or performed during the hospital encounter of 04/05/25   Chest XR,  PA & LAT    Impression    IMPRESSION: Minimal bibasilar atelectasis/scarring. No effusions or pneumothorax. Heart size is normal. Degenerative changes of the spine.   Basic metabolic panel   Result Value Ref Range    Sodium 139 135 - 145 mmol/L    Potassium 4.1 3.4 - 5.3 mmol/L    Chloride 103 98 - 107 mmol/L    Carbon Dioxide (CO2) 23 22 - 29 mmol/L    Anion Gap 13 7 - 15 mmol/L    Urea Nitrogen 9.1 6.0 - 20.0 mg/dL    Creatinine 0.44 (L) 0.51 - 0.95 mg/dL    GFR Estimate >90 >60 mL/min/1.73m2    Calcium 9.5 8.8 - 10.4 mg/dL    Glucose 303 (H) 70 - 99 mg/dL   D dimer quantitative   Result Value Ref Range    D-Dimer Quantitative <0.27 0.00 - 0.50 ug/mL FEU   Result Value Ref Range    Troponin  T, High Sensitivity <6 <=14 ng/L   Nt probnp inpatient   Result Value Ref Range    N terminal Pro BNP Inpatient 40 0 - 900 pg/mL   Influenza A/B, RSV and SARS-CoV2 PCR (COVID-19) Nasopharyngeal    Specimen: Nasopharyngeal; Swab   Result Value Ref Range    Influenza A PCR Negative Negative    Influenza B PCR Negative Negative    RSV PCR Negative Negative    SARS CoV2 PCR Negative Negative   CBC with platelets and differential   Result Value Ref Range    WBC Count 5.8 4.0 - 11.0 10e3/uL    RBC Count 4.24 3.80 - 5.20 10e6/uL    Hemoglobin 12.1 11.7 - 15.7 g/dL    Hematocrit 37.4 35.0 - 47.0 %    MCV 88 78 - 100 fL    MCH 28.5 26.5 - 33.0 pg    MCHC 32.4 31.5 - 36.5 g/dL    RDW 14.3 10.0 - 15.0 %    Platelet Count 182 150 - 450 10e3/uL    % Neutrophils 53 %    % Lymphocytes 27 %    % Monocytes 11 %    % Eosinophils 8 %    % Basophils 1 %    % Immature Granulocytes 1 %    NRBCs per 100 WBC 0 <1 /100    Absolute Neutrophils 3.1 1.6 - 8.3 10e3/uL    Absolute Lymphocytes 1.6 0.8 - 5.3 10e3/uL    Absolute Monocytes 0.6 0.0 - 1.3 10e3/uL    Absolute Eosinophils 0.5 0.0 - 0.7 10e3/uL    Absolute Basophils 0.0 0.0 - 0.2 10e3/uL    Absolute Immature Granulocytes 0.0 <=0.4 10e3/uL    Absolute NRBCs 0.0 10e3/uL       RADIOLOGY:  Reviewed all pertinent imaging. Please see official radiology report.  Chest XR,  PA & LAT   Final Result   IMPRESSION: Minimal bibasilar atelectasis/scarring. No effusions or pneumothorax. Heart size is normal. Degenerative changes of the spine.          EKG:    Performed at: 4/5/2025 at 07:19:17    Impression: Sinus tachycardia. Pulmonary disease pattern. Left anterior fascicular block.     Rate: 104 BPM  Rhythm: Sinus  Axis: -84  SD Interval: 146 ms  QRS Interval: 82 ms  QTc Interval: 470 ms  ST Changes: No ST changes.   Comparison: When compared with ECG from 12/29/2024 at 09:01, no significant change was found.     I have independently reviewed and interpreted the EKG(s) documented above.          I,  Gustavo Contreras, am serving as a scribe to document services personally performed by Mervin Yeh MD based on my observation and the provider's statements to me. I, Mervin Yeh MD, attest that Gustavo Contreras is acting in a scribe capacity, has observed my performance of the services and has documented them in accordance with my direction.    Mervin Yeh MD  [facility] EMERGENCY DEPARTMENT  [address] [place] [id number] [phone]        Mervin Yeh MD  04/05/25 111

## 2025-04-05 NOTE — DISCHARGE INSTRUCTIONS
Recommend prednisone for 5 days, continue your inhalers, recommend Delsym twice a day for cough.  Follow-up with your primary care doctor or lung doctor.  Return to ER if worsening symptoms

## 2025-04-08 ENCOUNTER — OFFICE VISIT (OUTPATIENT)
Dept: ALLERGY | Facility: CLINIC | Age: 55
End: 2025-04-08
Payer: COMMERCIAL

## 2025-04-08 ENCOUNTER — PATIENT OUTREACH (OUTPATIENT)
Dept: CARE COORDINATION | Facility: CLINIC | Age: 55
End: 2025-04-08

## 2025-04-08 VITALS — OXYGEN SATURATION: 98 % | BODY MASS INDEX: 23.65 KG/M2 | WEIGHT: 121.1 LBS | HEART RATE: 97 BPM

## 2025-04-08 DIAGNOSIS — J45.51 SEVERE PERSISTENT ASTHMA WITH ACUTE EXACERBATION (H): ICD-10-CM

## 2025-04-08 DIAGNOSIS — J45.50 SEVERE PERSISTENT ASTHMA WITHOUT COMPLICATION (H): Primary | ICD-10-CM

## 2025-04-08 PROCEDURE — 99214 OFFICE O/P EST MOD 30 MIN: CPT | Mod: 25 | Performed by: ALLERGY & IMMUNOLOGY

## 2025-04-08 PROCEDURE — 94640 AIRWAY INHALATION TREATMENT: CPT | Performed by: ALLERGY & IMMUNOLOGY

## 2025-04-08 RX ORDER — DUPILUMAB 300 MG/2ML
300 INJECTION, SOLUTION SUBCUTANEOUS
Qty: 4 ML | Refills: 2 | Status: SHIPPED | OUTPATIENT
Start: 2025-04-08

## 2025-04-08 RX ORDER — PREDNISONE 20 MG/1
TABLET ORAL
Qty: 30 TABLET | Refills: 0 | Status: SHIPPED | OUTPATIENT
Start: 2025-04-08

## 2025-04-08 RX ORDER — DUPILUMAB 300 MG/2ML
600 INJECTION, SOLUTION SUBCUTANEOUS ONCE
Qty: 4 ML | Refills: 0 | Status: SHIPPED | OUTPATIENT
Start: 2025-04-08 | End: 2025-04-08

## 2025-04-08 RX ORDER — IPRATROPIUM BROMIDE AND ALBUTEROL SULFATE 2.5; .5 MG/3ML; MG/3ML
3 SOLUTION RESPIRATORY (INHALATION) ONCE
Status: COMPLETED | OUTPATIENT
Start: 2025-04-08 | End: 2025-04-08

## 2025-04-08 RX ORDER — BUDESONIDE AND FORMOTEROL FUMARATE DIHYDRATE 160; 4.5 UG/1; UG/1
2 AEROSOL RESPIRATORY (INHALATION) 2 TIMES DAILY
Status: SHIPPED
Start: 2025-04-08

## 2025-04-08 RX ADMIN — IPRATROPIUM BROMIDE AND ALBUTEROL SULFATE 3 ML: 2.5; .5 SOLUTION RESPIRATORY (INHALATION) at 14:48

## 2025-04-08 ASSESSMENT — PATIENT HEALTH QUESTIONNAIRE - PHQ9: SUM OF ALL RESPONSES TO PHQ QUESTIONS 1-9: 16

## 2025-04-08 NOTE — PROGRESS NOTES
Clinic Care Coordination Contact  Community Health Worker Initial Outreach    CHW Initial Information Gathering:  Referral Source: ED Follow-Up  Current living arrangement:: Not Assessed  No PCP office visit in Past Year: No  CHW Additional Questions  If ED/Hospital discharge, follow-up appointment scheduled as recommended?: No  Patient agreeable to assistance with scheduling?: No  Medication changes made following ED/Hospital discharge?: N/A  MyChart active?: Yes    Patient accepts CC: No, Can't think of anything needed at this time. Patient will be sent Care Coordination introduction letter for future reference.     Marsha Lebron  Care   Connected Care Resources   [facility]     *Connected Care Resources Team does NOT follow patient ongoing. Referrals are identified based on internal discharge report and the outreach is to ensure Patient has understanding of their discharge instructions.

## 2025-04-08 NOTE — PATIENT INSTRUCTIONS
Prednisone 20 mg three times daily for 5 days, then 20 twice daily for 5 days, then 20 mg daily for 5 days    Dupixent 2 injections first then 1 injection every 2 weeks    Follow in 3 months  
English

## 2025-04-08 NOTE — LETTER
4/8/2025      Kulwant Amin  [address] [address]      Dear Colleague,    Thank you for referring your patient, Kulwant Amin, to the [facility]. Please see a copy of my visit note below.          Subjective  Kulwant is a 55 year old, presenting for the following health issues:  Asthma Recheck    HPI      Chief complaint: Asthma check    History of present illness: This is a pleasant 55-year-old woman accompanied by her daughter-in-law with a history of eosinophilic asthma, latent TB here today for follow-up visit.  Last seen in March of last year.  Since I saw her her daughter-in-law reports she is been having a lot of difficulty with frequent wheezing shortness of breath and cough.  Reviewed her history she has been on Fasenra which helped her asthma but caused increased creased itching.  She was then transition to Dupixent and then was on Tezspire.  Her daughter-in-law states that they were here today to discuss Dupixent as they believe this helps the most with her symptoms.  They would like to restart this.  She is been seen by a pulmonologist at [facility] but thinks she states she is going to follow-up here at [facility] again for her care.  She has been in the emergency room this last weekend for increased wheezing and is currently on prednisone 20 mg tablets 40 mg daily.  She is finishing with this dose today but she continues to have frequent wheezing and shortness of breath and is using albuterol or DuoNeb inhaler every 8 hours.  She does have some relief from this inhaler.  The daughter states about 1 to 2 months ago she was hospitalized for 6 or 7 days for asthma.  We reviewed the record she did have influenza in January.  She was hospitalized again in December and her daughter-in-law states she has been on frequent prednisone since I last saw her.          Objective   Pulse 97   Wt 54.9 kg (121 lb 1.6 oz)   SpO2 98%   BMI 23.65 kg/m    Body mass index is  23.65 kg/m .  Physical Exam     Gen: Pleasant female not in acute distress  HEENT: Eyes no erythema of the bulbar or palpebral conjunctiva, no edema. Nose: No congestion, Mouth: Throat clear, no lip or tongue edema.   Respiratory: Patient was very tight not moving air coughing frequently    Psych: Alert and oriented times 3    DuoNeb was given in the office, wheezing increased when she was moving air.    Impression report and plan:  1.  Severe asthma with acute exacerbation  2.  Severe persistent asthma    I did talk with him about Biologics and stated that previously I do not recall the Biologics helping but she states when I reviewed her record that the Biologics did improve specifically Dupixent and they would like to try this again.  For this reason I recommended 600 mg loading dose of 300 mg every 2 weeks.  Reviewed risk of eye irritation and eosinophilic granulomatous polyangitis and cutaneous t-cell lymphoma.  Follow in 3 months.  Stated I have low threshold for taking the patient back to the emergency room given her exam today.  I increased her prednisone to 20 mg 3 times daily with a very slow taper to hopefully get this to the point where Dupixent is approved and he can start this.  She will be following with a pulmonologist in 1 to 2 weeks.        Signed Electronically by: Elizabeth Marie MD      Again, thank you for allowing me to participate in the care of your patient.        Sincerely,        Elizabeth Marie MD    Electronically signed

## 2025-04-08 NOTE — PROGRESS NOTES
Doug Blum is a 55 year old, presenting for the following health issues:  Asthma Recheck    HPI      Chief complaint: Asthma check    History of present illness: This is a pleasant 55-year-old woman accompanied by her daughter-in-law with a history of eosinophilic asthma, latent TB here today for follow-up visit.  Last seen in March of last year.  Since I saw her her daughter-in-law reports she is been having a lot of difficulty with frequent wheezing shortness of breath and cough.  Reviewed her history she has been on Fasenra which helped her asthma but caused increased creased itching.  She was then transition to Dupixent and then was on Tezspire.  Her daughter-in-law states that they were here today to discuss Dupixent as they believe this helps the most with her symptoms.  They would like to restart this.  She is been seen by a pulmonologist at [facility] but thinks she states she is going to follow-up here at [facility] again for her care.  She has been in the emergency room this last weekend for increased wheezing and is currently on prednisone 20 mg tablets 40 mg daily.  She is finishing with this dose today but she continues to have frequent wheezing and shortness of breath and is using albuterol or DuoNeb inhaler every 8 hours.  She does have some relief from this inhaler.  The daughter states about 1 to 2 months ago she was hospitalized for 6 or 7 days for asthma.  We reviewed the record she did have influenza in January.  She was hospitalized again in December and her daughter-in-law states she has been on frequent prednisone since I last saw her.          Objective    Pulse 97   Wt 54.9 kg (121 lb 1.6 oz)   SpO2 98%   BMI 23.65 kg/m    Body mass index is 23.65 kg/m .  Physical Exam     Gen: Pleasant female not in acute distress  HEENT: Eyes no erythema of the bulbar or palpebral conjunctiva, no edema. Nose: No congestion, Mouth: Throat clear, no lip or tongue edema.   Respiratory:  Patient was very tight not moving air coughing frequently    Psych: Alert and oriented times 3    DuoNeb was given in the office, wheezing increased when she was moving air.    Impression report and plan:  1.  Severe asthma with acute exacerbation  2.  Severe persistent asthma    I did talk with him about Biologics and stated that previously I do not recall the Biologics helping but she states when I reviewed her record that the Biologics did improve specifically Dupixent and they would like to try this again.  For this reason I recommended 600 mg loading dose of 300 mg every 2 weeks.  Reviewed risk of eye irritation and eosinophilic granulomatous polyangitis and cutaneous t-cell lymphoma.  Follow in 3 months.  Stated I have low threshold for taking the patient back to the emergency room given her exam today.  I increased her prednisone to 20 mg 3 times daily with a very slow taper to hopefully get this to the point where Dupixent is approved and he can start this.  She will be following with a pulmonologist in 1 to 2 weeks.        Signed Electronically by: Elizabeth Marie MD

## 2025-04-08 NOTE — LETTER
Kulwant Amin  [address] [facility], [place] [address]    Dear Kulwant Amin,      I am a team member within the [facility] with [facility]. I recently tried to reach you to ensure you were doing well following a recent visit within our health system. I also wanted to take this chance to introduce Clinic Care Coordination.     Below is a description of Clinic Care Coordination and how this team can further assist you:       The Clinic Care Coordination team is made up of a Registered Nurse, , Financial Resource Worker, and a Community Health Worker who understand and can help navigate the health care system. The goal of clinic care coordination is to help you manage your health, improve access to care, and achieve optimal health outcomes. They work alongside your provider to assist you in determining your health and social needs, obtain health care and community resources, and provide you with necessary information and education. Clinic Care Coordination can work with you through any barriers and develop a care plan that helps coordinate and strengthen the relationship between you and your care team.    If you wish to connect with the Clinic Care Coordination Team, please let your [facility] Primary Care Provider or Clinic Care Team know and they can place a referral. The Clinic Care Coordination team will then reach out by phone to further support you.    We are focused on providing you with the highest-quality healthcare experience possible.    Sincerely,   Marsha Lebron  Care   Connected Care Resources   [facility] [address] [place] [address] ([phone]).

## 2025-04-09 ENCOUNTER — TELEPHONE (OUTPATIENT)
Dept: ALLERGY | Facility: CLINIC | Age: 55
End: 2025-04-09
Payer: COMMERCIAL

## 2025-04-09 DIAGNOSIS — M54.41 CHRONIC BILATERAL LOW BACK PAIN WITH BILATERAL SCIATICA: ICD-10-CM

## 2025-04-09 DIAGNOSIS — Z76.0 ENCOUNTER FOR MEDICATION REFILL: ICD-10-CM

## 2025-04-09 DIAGNOSIS — M54.42 CHRONIC BILATERAL LOW BACK PAIN WITH BILATERAL SCIATICA: ICD-10-CM

## 2025-04-09 DIAGNOSIS — G89.29 CHRONIC BILATERAL LOW BACK PAIN WITH BILATERAL SCIATICA: ICD-10-CM

## 2025-04-09 LAB
ATRIAL RATE - MUSE: 104 BPM
DIASTOLIC BLOOD PRESSURE - MUSE: 84 MMHG
INTERPRETATION ECG - MUSE: NORMAL
P AXIS - MUSE: 66 DEGREES
PR INTERVAL - MUSE: 146 MS
QRS DURATION - MUSE: 82 MS
QT - MUSE: 358 MS
QTC - MUSE: 470 MS
R AXIS - MUSE: -84 DEGREES
SYSTOLIC BLOOD PRESSURE - MUSE: 145 MMHG
T AXIS - MUSE: 58 DEGREES
VENTRICULAR RATE- MUSE: 104 BPM

## 2025-04-09 RX ORDER — GABAPENTIN 600 MG/1
TABLET ORAL
Qty: 90 TABLET | Refills: 3 | Status: SHIPPED | OUTPATIENT
Start: 2025-04-09

## 2025-04-09 NOTE — TELEPHONE ENCOUNTER
[place] Initiation    Medication: DUPIXENT 300 MG/2ML SC SOAJ  Insurance Company: [employer] - Phone [phone] Fax [phone]  Pharmacy Filling the Rx: [facility], [place] - [address] [place] [address]  Filling Pharmacy Phone:    Filling Pharmacy Fax:    Start Date: 4/9/2025       [email or web address]      [email or web address]

## 2025-04-09 NOTE — TELEPHONE ENCOUNTER
Last Written Prescription Date:  12/16/2024  Last Fill Quantity: 90,  # refills: 3   Last office visit: 3/4/2024 ; last virtual visit: Visit date not found with prescribing provider:     Future Office Visit:   Next 5 appointments (look out 90 days)      May 01, 2025 11:30 AM  (Arrive by 11:10 AM)  Adult Preventative Visit with Adrien Cardoza MD  [facility] ([facility]) [address] [place] [address]  [phone]     Jun 03, 2025 11:00 AM  Pharmacist Visit with Massimo ThomasD  [facility] ([facility]) [address] [place] [address] [phone]               Requested Prescriptions   Pending Prescriptions Disp Refills    gabapentin (NEURONTIN) 600 MG tablet [Pharmacy Med Name: GABAPENTIN 600 MG TABS 600 Tablet] 90 tablet 3     Sig: TAKE 1 PILL (600 MG) BY MOUTH 3 TIMES DAILY       There is no refill protocol information for this order

## 2025-04-10 NOTE — TELEPHONE ENCOUNTER
Prior Authorization Approval    Medication: DUPIXENT 300 MG/2ML SC SOAJ  Authorization Effective Date: 3/10/2025  Authorization Expiration Date: 10/6/2025  Approved Dose/Quantity: 6ml for 28 days  Reference #: [id number]   Insurance Company: [employer] - Phone [phone] Fax [phone]  Expected CoPay: $    CoPay Card Available: No    Financial Assistance Needed: n/a  Which Pharmacy is filling the prescription: [facility] - [address] [place] [address]  Pharmacy Notified: yes  Patient Notified: yes

## 2025-04-14 DIAGNOSIS — G43.719 INTRACTABLE CHRONIC MIGRAINE WITHOUT AURA AND WITHOUT STATUS MIGRAINOSUS: ICD-10-CM

## 2025-04-14 DIAGNOSIS — G44.40 MEDICATION OVERUSE HEADACHE: ICD-10-CM

## 2025-04-14 RX ORDER — AMITRIPTYLINE HYDROCHLORIDE 50 MG/1
50 TABLET ORAL AT BEDTIME
Qty: 90 TABLET | Refills: 1 | Status: SHIPPED | OUTPATIENT
Start: 2025-04-14

## 2025-04-14 NOTE — TELEPHONE ENCOUNTER
Refill request for the following medication (s) listed below.    Pending Prescriptions:                       Disp   Refills    amitriptyline (ELAVIL) 50 MG tablet [Phar*90 tab*1            Sig: TAKE 1 TABLET (50 MG) BY MOUTH AT BEDTIME      Last office visit provider:  10/21/24  Next appointment scheduled: 04/22/25      Medication T'd for review and signature    Myrtle Richard MA on 4/14/2025 at 11:02 AM

## 2025-04-22 ENCOUNTER — OFFICE VISIT (OUTPATIENT)
Dept: NEUROLOGY | Facility: CLINIC | Age: 55
End: 2025-04-22
Payer: COMMERCIAL

## 2025-04-22 VITALS
HEART RATE: 122 BPM | HEIGHT: 60 IN | DIASTOLIC BLOOD PRESSURE: 51 MMHG | BODY MASS INDEX: 24.54 KG/M2 | SYSTOLIC BLOOD PRESSURE: 122 MMHG | WEIGHT: 125 LBS

## 2025-04-22 DIAGNOSIS — G43.719 INTRACTABLE CHRONIC MIGRAINE WITHOUT AURA AND WITHOUT STATUS MIGRAINOSUS: ICD-10-CM

## 2025-04-22 DIAGNOSIS — R25.2 LEG CRAMPS: ICD-10-CM

## 2025-04-22 DIAGNOSIS — G44.40 MEDICATION OVERUSE HEADACHE: ICD-10-CM

## 2025-04-22 DIAGNOSIS — R79.0 LOW FERRITIN: ICD-10-CM

## 2025-04-22 PROCEDURE — 99214 OFFICE O/P EST MOD 30 MIN: CPT | Performed by: PSYCHIATRY & NEUROLOGY

## 2025-04-22 PROCEDURE — 3078F DIAST BP <80 MM HG: CPT | Performed by: PSYCHIATRY & NEUROLOGY

## 2025-04-22 PROCEDURE — 3074F SYST BP LT 130 MM HG: CPT | Performed by: PSYCHIATRY & NEUROLOGY

## 2025-04-22 PROCEDURE — G2211 COMPLEX E/M VISIT ADD ON: HCPCS | Performed by: PSYCHIATRY & NEUROLOGY

## 2025-04-22 RX ORDER — TIZANIDINE 2 MG/1
2 TABLET ORAL 3 TIMES DAILY PRN
Qty: 270 TABLET | Refills: 1 | Status: SHIPPED | OUTPATIENT
Start: 2025-04-22

## 2025-04-22 RX ORDER — FERROUS SULFATE 325(65) MG
325 TABLET ORAL
Qty: 90 TABLET | Refills: 4 | Status: SHIPPED | OUTPATIENT
Start: 2025-04-22

## 2025-04-22 RX ORDER — AMITRIPTYLINE HYDROCHLORIDE 50 MG/1
50 TABLET ORAL AT BEDTIME
Qty: 90 TABLET | Refills: 1 | Status: SHIPPED | OUTPATIENT
Start: 2025-04-22

## 2025-04-22 NOTE — LETTER
4/22/2025      Kulwant Amin  [address] [facility] [id number]      Dear Colleague,    Thank you for referring your patient, Kulwant Amin, to the [facility] [place]. Please see a copy of my visit note below.    NEUROLOGY OUTPATIENT PROGRESS NOTE  Apr 22, 2025     CHIEF COMPLAINT/REASON FOR VISIT/REASON FOR CONSULT  Patient presents with:  Stroke: Daughter states she is still having headaches and dizziness daily. Follow up    REASON FOR CONSULTATION- Multiple issues    REFERRAL SOURCE   Referred Self  CC  Referred Self    HISTORY OF PRESENT ILLNESS  Kulwant Amin is a 55 year old female seen today for evaluation of multiple issues.    In 2021 she was found to have a right pontine stroke.  Presenting symptoms of vertigo.  No clear etiology for the stroke was identified it was thought to be lacunar.  She does have a history of hypertension, hyperlipidemia, diabetes.  She was supposed to be on Plavix though currently is only on aspirin.  No recurrence of stroke symptoms.  She is presented to the ER since then with vertigo symptoms and her imaging studies have been negative    1.  She reports that the vertigo is there all the time.  She is using meclizine which she finds some benefit with. She has done vestibular rehab without any improvement.    2.  Further complains of headaches.  These are around-the-clock.  He is taking Tylenol every 8 hours to get rid of the headaches.  Headaches are more frontal.  They can be throbbing in nature with photophobia and phonophobia.  She does continue visual auras as well.  She is on amitriptyline at nighttime.  She feels that it might be helping with the headaches.  Does not get good sleep due to COPD.  Headaches started after her stroke.    3.  No other new strokelike symptoms.    4.  Does complain of some lightheadedness especially during the exam today.  Has been put on hydrochlorothiazide by her primary care provider.    5/24/23  Patient  returns today  1.  No stroke symptoms.  Is tolerating her stroke medications  2.  Continues to have continuous headache 24/7.  Occasionally it will go away but then come back.  Is still overusing Tylenol.  Generally uses 1 tablet a day but can use up to 3 tablets a day.  Amitriptyline did help with the headaches and now they are not getting as severe as compared to before.  3.  Still feels dizzy.  Drinking plenty of water.    8/10/23  Patient returns today  1.  No further strokelike symptoms.  Continues to complain of some dizziness.  2.  Headaches have improved with use of the amitriptyline.  She continues to have a very mild continuous headache all the time.  She is only using the Tylenol 1-2 times a week.  The more severe headaches are only 1-2 times a week as well.  Amitriptyline does help without side effects though she would like to try other medications  3.  Drinks plenty of water.  No history of kidney stones.    10/26/23  Patient returns to the  1.  No further strokelike symptoms.  2.  Continues to have headaches every day.  Some associated dizziness.  She did try the Topamax and that was thought to be causing muscle cramps and as a result she stopped it.  No benefit with the headaches.  3.  Muscle cramps in the legs are still present.  When asked where she has pain in her legs she describes all over with stretching her knee and she might have knee pain.  She also reports bilateral leg weakness.  Does have chronic back pain that has not really worsened.  Some neck stiffness as well.  No other new neurological symptoms.    1/16/24  Patient returns today.  1.  No further strokelike symptoms.  Has had been having a lot of left leg weakness.  Was seen in the ER and put on some steroids.  This is not really helped.  There is pain and spasms in the left leg along with weakness.  Continues to have back pain.  Has not been able to do the MRIs  2.  Headaches are better and she is having 5 headaches a month.   Emgality does help with the headaches.  Tylenol does help with the headaches and also prevents headaches for few days.  3.  No further symptoms.    3/18/24  Patient demonstrated  1.  No further strokelike symptoms  2.  Continues to have weakness in her legs though muscle cramps and spasms are much better with use of tizanidine.  Is working with physical therapy which is also helping.  3.  Continues to stay on the Emgality and the amitriptyline.  Headaches are 2-3 times a week but much less severe compared to before.  No side effects with the medication.  Headaches are much more tolerable and does not want more medications  No other new concerns.    10/21/24  Patient returns today  1.  Headaches have gotten worse that now she is not taking her Emgality because she ran out of the medication.  When she was on the Emgality she was having 1-2 headaches a week.  Headaches were not very severe and Tylenol would easily abort them.  No other new provoking factors.  Remains on the amitriptyline as well  2.  Does have leg cramps for which she was using tizanidine.  It is no longer listed on that medication list though she reports that she is still using it.  Will represcribe.  3.  No further stroke symptoms  4.  Continues to have vertigo.  Discussed lack of treatment options for this.  They would like a refill on the meclizine  No other new concerns.    4/22/25  Patient returns today  1.  Headaches have gotten worse with her stopping the Emgality.  The pharmacy would not give her the Emgality.  Remains on the amitriptyline which is still helping.  Is getting good sleep at night.  Is using Tylenol for abortive therapy.  Does Tylenol around-the-clock for her body pain.  Encouraged her not to overuse the Tylenol  2.  Muscle cramps are stable similar to before with the tizanidine.  3.  She does complain of vertigo.  I suspect this might be related to her migraines.  Previously meclizine was being used for vertigo  4.  She has been  working with the spine center for back pain which she feels is helping.  No other new concerns.    Previous history is reviewed and this is unchanged.    PAST MEDICAL/SURGICAL HISTORY  Past Medical History:   Diagnosis Date     Acute asthma exacerbation 01/06/2020     Anxiety      Arthritis      Asthma exacerbation 11/19/2015     Chronic obstructive pulmonary disease, unspecified COPD type (H)      COPD (chronic obstructive pulmonary disease) (H)      Coronary artery disease due to lipid rich plaque      Depression      Diabetes (H)      Epigastric pain 12/15/2021     Essential hypertension      GERD (gastroesophageal reflux disease)      Infection due to 2019 novel coronavirus 01/03/2022    positive with COVID-19 on January 3, 2022     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     Microcytic anemia 11/17/2019     Motion sickness      PONV (postoperative nausea and vomiting)      S/P coronary artery stent placement 02/02/2018     Sleep apnea      TB lung, latent     9 mos INH     Patient Active Problem List   Diagnosis     Pulmonary nodule, right     Environmental allergies     TB lung, latent     COPD (chronic obstructive pulmonary disease)/Asthma      Essential hypertension     Cataracts, bilateral     Corneal opacity     Irregular astigmatism     Anxiety     Chronic nonintractable headache, unspecified headache type     Coronary artery disease due to lipid rich plaque     Dizziness     Moderate persistent asthma     Unspecified visual loss     GERD (gastroesophageal reflux disease)     Dental caries     H/O arterial ischemic stroke     Constipation     Dysphagia     Chronic abdominal pain     Insomnia     FLACO (obstructive sleep apnea)- mild (AHI 14)     Type 2 diabetes mellitus treated with insulin (H)     Major depression, recurrent (H)     Chronic low back pain without sciatica, unspecified back pain laterality     Shortness of breath     Poorly controlled persistent asthma     Heartburn   Significant for arthritis,  diabetes, high blood pressure, hyperlipidemia, stroke    FAMILY HISTORY  Family History   Problem Relation Age of Onset     Other - See Comments Mother          of an intestinal problem     Ulcers Father          of gastritis     Breast Cancer No family hx of      Ovarian Cancer No family hx of      Colon Cancer No family hx of    Negative for stroke    SOCIAL HISTORY  Social History     Tobacco Use     Smoking status: Former     Current packs/day: 0.00     Average packs/day: 1 pack/day for 30.0 years (30.0 ttl pk-yrs)     Types: Cigarettes     Start date: 1973     Quit date: 2003     Years since quittin.3     Passive exposure: Never     Smokeless tobacco: Never     Tobacco comments:     No passive exposure   Vaping Use     Vaping status: Never Used   Substance Use Topics     Alcohol use: No     Drug use: No       SYSTEMS REVIEW  Twelve-system ROS was done and other than the HPI this was negative except for back pain, joint pain, numbness and tingling, weakness/paralysis, difficulty walking, balance/coordination problems, dizziness, headaches, anxiety, cardiac/heart problems, respiratory problems, snoring loudly.  No new symptoms/issues.    MEDICATIONS  Current Outpatient Medications   Medication Sig Dispense Refill     acetaminophen (TYLENOL) 325 MG tablet Take 650 mg by mouth every 6 hours as needed for mild pain.       albuterol (PROAIR HFA/PROVENTIL HFA/VENTOLIN HFA) 108 (90 Base) MCG/ACT inhaler Inhale 2 puffs into the lungs every 6 hours as needed for shortness of breath, wheezing or cough. 18 g 11     amitriptyline (ELAVIL) 50 MG tablet Take 1 tablet (50 mg) by mouth at bedtime. 90 tablet 1     aspirin 81 MG EC tablet Take 1 tablet (81 mg) by mouth daily. 90 tablet 3     atorvastatin (LIPITOR) 80 MG tablet Take 1 tablet (80 mg) by mouth daily. 90 tablet 3     BD EZ U/F 32G X 4 MM insulin pen needle USE 3 PEN NEEDLES DAILY OR AS DIRECTED *34 DAY SUPPLY* 300 each 2      budesonide-formoterol (SYMBICORT/BREYNA) 160-4.5 MCG/ACT Inhaler Inhale 2 puffs into the lungs 2 times daily.       calcium carbonate (TUMS) 500 MG chewable tablet Take 1 chew tab by mouth 2 times daily as needed for heartburn.       colchicine (COLCRYS) 0.6 MG tablet Take 1 tablet (0.6 mg) by mouth daily. 90 tablet 3     Continuous Glucose  (FREESTYLE MONICA 14 DAY READER) MICHAEL Use to read blood sugars as per 's instructions. 1 each 0     Continuous Glucose Sensor (FREESTYLE MONICA 2 SENSOR) MISC 1 EACH EVERY 14 DAYS USE 1 SENSOR EVERY 14 DAYS. USE TO READ BLOOD SUGARS PER 'S INSTRUCTIONS. 2 each 11     CONTOUR NEXT TEST test strip USE 1 EACH AS DIRECTED 3 (THREE) TIMES A DAY. 50 strip 11     dextromethorphan (DELSYM) 30 MG/5ML liquid Take 10 mLs (60 mg) by mouth 2 times daily. 89 mL 0     diclofenac (VOLTAREN) 1 % topical gel Apply 4 g topically 4 times daily 350 g 3     dupilumab (DUPIXENT) 300 MG/2ML prefilled pen Inject 2 mLs (300 mg) subcutaneously every 14 days. 4 mL 2     ferrous sulfate (FEROSUL) 325 (65 Fe) MG tablet Take 1 tablet (325 mg) by mouth daily (with breakfast). 90 tablet 4     FLUoxetine (PROZAC) 20 MG capsule TAKE 1 CAPSULE (20 MG) BY MOUTH DAILY. 90 capsule 1     gabapentin (NEURONTIN) 600 MG tablet TAKE 1 PILL (600 MG) BY MOUTH 3 TIMES DAILY 90 tablet 3     galcanezumab-gnlm (EMGALITY) 120 MG/ML injection Inject 1 mL (120 mg) subcutaneously every 28 days. 1 mL 3     galcanezumab-gnlm (EMGALITY) 120 MG/ML injection Inject 2 mLs (240 mg) subcutaneously every 28 days. Loading dose. 2 mL 0     Global Inject Ease Lancets 30G MISC USE 1 EACH AS DIRECTED 3 (THREE) TIMES A DAY. DISPENSE BRAND PER PATIENT'S INSURANCE AT PHARMACY DISCRETION.(E11.9) *34 DAYS* 200 each 11     insulin aspart (NOVOLOG PEN) 100 UNIT/ML pen Inject 14 Units subcutaneously 2 times daily (before meals). 45 mL 3     insulin glargine (LANTUS PEN) 100 UNIT/ML pen Inject 40 Units subcutaneously every  morning. 45 mL 3     ipratropium - albuterol 0.5 mg/2.5 mg/3 mL (DUONEB) 0.5-2.5 (3) MG/3ML neb solution Take 1 vial (3 mLs) by nebulization 4 times daily. 360 mL 11     Lidocaine (LIDOCARE) 4 % Patch Place 1 patch onto the skin every 24 hours. To prevent lidocaine toxicity, patient should be patch free for 12 hrs daily. 30 patch 1     lisinopril (ZESTRIL) 5 MG tablet TAKE 1 TABLET (5 MG) BY MOUTH DAILY. 30 tablet 2     meclizine (ANTIVERT) 25 MG tablet Take 1 tablet (25 mg) by mouth 3 times daily as needed for dizziness. 90 tablet 1     metFORMIN (GLUCOPHAGE XR) 500 MG 24 hr tablet Take 2 tablets (1,000 mg) by mouth 2 times daily (with meals). 360 tablet 3     metoprolol tartrate (LOPRESSOR) 25 MG tablet TAKE ONE-HALF TABLET (12.5 MG TOTAL) BY MOUTH 2 (TWO) TIMES A DAY FOR BLOOD PRESSURE 45 tablet 3     montelukast (SINGULAIR) 10 MG tablet TAKE 1 TABLET (10 MG) BY MOUTH AT BEDTIME 30 tablet 9     NEXIUM 40 MG DR capsule Take 40 mg by mouth every morning (before breakfast).       polyethylene glycol (MIRALAX) 17 GM/Dose powder Take 17 g (1 Capful) by mouth daily 238 g 1     semaglutide (OZEMPIC) 2 MG/3ML pen Inject 0.25 mg subcutaneously every 7 days. 3 mL 3     sennosides (SENOKOT) 8.6 MG tablet Take 1 tablet by mouth daily as needed for constipation. 30 tablet 3     sucralfate (CARAFATE) 1 GM/10ML suspension Take 10 mLs (1 g) by mouth 4 times daily. (Patient taking differently: Take 1 g by mouth 4 times daily as needed.) 3600 mL 1     tiZANidine (ZANAFLEX) 2 MG tablet Take 1 tablet (2 mg) by mouth 3 times daily as needed for muscle spasms. 270 tablet 1     vitamin D3 (CHOLECALCIFEROL) 10 MCG (400 UNIT) capsule Take 1 capsule (400 Units) by mouth daily. 90 capsule 3     No current facility-administered medications for this visit.        PHYSICAL EXAMINATION  VITALS: /51   Pulse (!) 122   Ht 1.524 m (5')   Wt 56.7 kg (125 lb)   BMI 24.41 kg/m    GENERAL: Healthy appearing, alert, no acute distress, normal  habitus.  CARDIOVASCULAR: Extremities warm and well perfused. Pulses present.   NEUROLOGICAL:  Patient is awake and oriented to self, place and time.  Attention span is normal.  Memory is grossly intact.  Language is fluent and follows commands appropriately.  Appropriate fund of knowledge. Cranial nerves 2-12 are intact. There is no pronator drift.  Motor exam shows  4/5 strength in all extremities-effort dependent.  Generalized weakness due to pain all over.  Tone is symmetric bilaterally in upper and lower extremities.  Previously reflexes are symmetric and 1+ in upper extremities and lower extremities. Sensory exam is grossly intact to light touch, pin prick and vibration.  Finger to nose and heel to shin is without dysmetria.  Romberg is negative.  Gait is normal and the patient is able to do tandem walk and walk on toes and heels with mild difficulty.  Orthostatic vitals    Exam shows some worsening left hip flexion weakness related to pain.  Possibly this is more knee pain than neurological pain.  Exam stable.  Significant back pain affecting ambulation/: Leg weakness.    DIAGNOSTICS  MRI 2022  IMPRESSION:  1.  No acute intracranial abnormalities identified.  2.  Minor chronic small vessel ischemic change, otherwise unremarkable contrast-enhanced MRI of the brain.    MRA                                                               IMPRESSION:  1.  Normal MRA [place].      MRA neck  IMPRESSION:  1.  Normal neck MRA.    Cardiac event monitor    ECHo  Left ventricular size, wall motion and function are normal. The ejection  fraction is 55-60%.  There is borderline anterior, septal, and apical wall hypokinesis.  Normal right ventricle size and systolic function.  No hemodynamically significant valvular abnormalities on 2D or color flow  Imaging.      CTA 2021  HEAD CT:  1.  No evidence of acute hemorrhage, mass effect, or acute vascular territorial infarction.  2.  Severe left sphenoid sinus disease.      HEAD CTA:   1.  No evidence of proximal large vessel occlusion or flow-limiting stenosis.  2.  Dorsally directed 2 mm contour irregularity arising from the distal left supraclinoid ICA may represent a tiny posterior communicating artery aneurysm.     NECK CTA:  1.  No hemodynamically significant stenosis based on NASCET criteria.  2.  Mild left V1 segment stenosis.  3.  No evidence for dissection.      MRI 2020  IMPRESSION:  MRI BRAIN:  1. No finding for acute infarct, hemorrhage, or mass.  2. There are a few very small foci of FLAIR hyperintensity within the cerebral white matter, nonspecific but compatible with small areas of gliosis and/or chronic myelin loss.     MRA [place]:  1. Congenital variation of the [place] without vascular cutoff, aneurysm, or flow-limiting stenosis.      MRI 2021  HEAD MRI:   1.  Tiny linear focus of diffusion restriction within the right dorsal diomedes suspicious for acute small vessel infarct. This is new compared to 12/07/2020.  2.  No hemorrhagic transformation or mass effect. No additional infarct identified.  3.  Mild presumed microvascular ischemic change within the cerebral white matter.     HEAD MRA:   1.  No aneurysm, high flow AVM or significant stenosis identified.  2.  Variant [place] anatomy as above.     NECK MRA:  1.  Evaluation degraded by suboptimal timing of the contrast bolus and significant venous contamination the gadolinium bolus MRA.  2.  No hemodynamically significant stenosis in the carotid circulation by NASCET criteria.  3.  Poor visualization of the left vertebral artery origin and proximal left V1 segment. This may be artifactual, but it is difficult to exclude high-grade stenosis in this region.      MRI 2021  IMPRESSION:  1.  No mass, hemorrhage, or recent infarct identified.  2.  Diffusion abnormality involving the right ventral diomedes seen on 01/13/2021 is no longer identified. No definite residual signal abnormality in this  location to confirm prior infarct. This may relate to small size and slice selection.  3.  Inflammatory changes of the paranasal sinuses including bilateral maxillary air-fluid levels. Correlate with clinical evidence for sinusitis.      RELEVANT LABS  Component      Latest Ref Rng & Units 11/30/2022 2/5/2023   WBC      4.0 - 11.0 10e3/uL  9.3   RBC Count      3.80 - 5.20 10e6/uL  4.59   Hemoglobin      11.7 - 15.7 g/dL  11.8   Hematocrit      35.0 - 47.0 %  38.1   MCV      78 - 100 fL  83   MCH      26.5 - 33.0 pg  25.7 (L)   MCHC      31.5 - 36.5 g/dL  31.0 (L)   RDW      10.0 - 15.0 %  15.1 (H)   Platelet Count      150 - 450 10e3/uL  228   % Neutrophils      %  69   % Lymphocytes      %  14   % Monocytes      %  6   % Eosinophils      %  11   % Basophils      %  0   % Immature Granulocytes      %  0   NRBCs per 100 WBC      <1 /100  0   Absolute Neutrophils      1.6 - 8.3 10e3/uL  6.4   Absolute Lymphocytes      0.8 - 5.3 10e3/uL  1.3   Absolute Monocytes      0.0 - 1.3 10e3/uL  0.6   Absolute Eosinophils      0.0 - 0.7 10e3/uL  1.0 (H)   Absolute Basophils      0.0 - 0.2 10e3/uL  0.0   Absolute Immature Granulocytes      <=0.4 10e3/uL  0.0   Absolute NRBCs      10e3/uL  0.0   Sodium      136 - 145 mmol/L  138   Potassium      3.4 - 5.3 mmol/L  4.2   Chloride      98 - 107 mmol/L  102   Carbon Dioxide (CO2)      22 - 29 mmol/L  25   Anion Gap      7 - 15 mmol/L  11   Urea Nitrogen      6.0 - 20.0 mg/dL  10.6   Creatinine      0.51 - 0.95 mg/dL  0.58   Calcium      8.6 - 10.0 mg/dL  9.5   Glucose      70 - 99 mg/dL  156 (H)   GFR Estimate      >60 mL/min/1.73m2  >90   Hemoglobin A1C      0.0 - 5.6 % 8.6 (H)      OUTSIDE RECORDS  Outside referral notes and chart notes were reviewed and pertinent information has been summarized (in addition to the HPI):- Dr. Multani notes reviewed      ER notes above reviewed    Component      Latest Ref Rng 9/14/2023  9:21 AM 10/1/2023  9:42 AM   Sodium      135 - 145 mmol/L  136     Potassium      3.4 - 5.3 mmol/L  4.3    Chloride      98 - 107 mmol/L  102    Carbon Dioxide (CO2)      22 - 29 mmol/L  22    Anion Gap      7 - 15 mmol/L  12    Urea Nitrogen      6.0 - 20.0 mg/dL  9.2    Creatinine      0.51 - 0.95 mg/dL  0.53    GFR Estimate      >60 mL/min/1.73m2  >90    Calcium      8.6 - 10.0 mg/dL  9.1    Glucose      70 - 99 mg/dL  201 (H)    WBC      4.0 - 11.0 10e3/uL  7.2    RBC Count      3.80 - 5.20 10e6/uL  4.73    Hemoglobin      11.7 - 15.7 g/dL  11.6 (L)    Hematocrit      35.0 - 47.0 %  38.4    MCV      78 - 100 fL  81    MCH      26.5 - 33.0 pg  24.5 (L)    MCHC      31.5 - 36.5 g/dL  30.2 (L)    RDW      10.0 - 15.0 %  15.7 (H)    Platelet Count      150 - 450 10e3/uL  230    CRP Inflammation      <5.00 mg/L 3.20        Legend:  (H) High  (L) Low    LABS  Component      Latest Ref [facility] 11/7/2023  10:46 AM   Sodium      135 - 145 mmol/L 138    Potassium      3.4 - 5.3 mmol/L 4.0    Carbon Dioxide (CO2)      22 - 29 mmol/L 25    Anion Gap      7 - 15 mmol/L 13    Urea Nitrogen      6.0 - 20.0 mg/dL 13.3    Creatinine      0.51 - 0.95 mg/dL 0.59    GFR Estimate      >60 mL/min/1.73m2 >90    Calcium      8.6 - 10.0 mg/dL 9.3    Chloride      98 - 107 mmol/L 100    Glucose      70 - 99 mg/dL 184 (H)    Alkaline Phosphatase      35 - 104 U/L 116 (H)    AST      0 - 45 U/L 48 (H)    ALT      0 - 50 U/L 41    Protein Total      6.4 - 8.3 g/dL 7.1    Albumin      3.5 - 5.2 g/dL 4.1    Bilirubin Total      <=1.2 mg/dL 0.3    KATY-1 (Histidyl-tRNA Synthetase) Bere IgG      0 - 40 AU/mL 1    PL-12 (alanyl-tRNA synthetase) Antibody      Negative  Negative    PL-7 (threonyl-tRNA synthetase) Antibody      Negative  Negative    EJ (glycyl - tRNA synthetase) Antibody      Negative  Negative    OJ (isoleucyl-tRNA synthetase) Antibody      Negative  Negative    SRP (Signal Recognition Particle) Antibody      Negative  Negative    Mi-2 (nuclear helicase protein) Antibody      Negative  Negative     P155/140 (TIF1-gamma) Antibody      Negative  Negative    TIF-1 Gamma Antibody      Negative  Negative    SAE1 (SUMO activating enzyme) Bere      Negative  Negative    MDA5 (CADM 140) Bere      Negative  Negative    NXP-2 (Nuclear matrix protein 2) Bere      Negative  Negative    Myositis Interp See Note    Albumin Fraction      3.7 - 5.1 g/dL 3.8    Alpha 1 Fraction      0.2 - 0.4 g/dL 0.3    Alpha 2 Fraction      0.5 - 0.9 g/dL 0.7    Beta Fraction      0.6 - 1.0 g/dL 0.9    Gamma Fraction      0.7 - 1.6 g/dL 1.0    Monoclonal Peak      <=0.0 g/dL 0.0    ELP Interpretation: Essentially normal electrophoretic pattern. No obvious monoclonal proteins seen. Pathologic significance requires clinical correlation. Marlene Zamudio M.D., Ph.D.    Vitamin B12      232 - 1,245 pg/mL 602    Vitamin B1 Whole Blood Level      70 - 180 nmol/L 111    TSH      0.30 - 4.20 uIU/mL 0.35    Hemoglobin A1C      0.0 - 5.6 % 11.1 (H)    Magnesium      1.7 - 2.3 mg/dL 1.7    CK Total      26 - 192 U/L 59    Ferritin      11 - 328 ng/mL 26    Immunofixation ELP No monoclonal protein seen on immunofixation. Pathologic significance requires clinical correlation. Marlene Zamudio M.D., Ph.D.    Total Protein Serum for ELP      6.4 - 8.3 g/dL 6.7       Legend:  (H) High  ER notes reviewed.    CLINICAL INTERPRETATION:  This is an abnormal nerve conduction and EMG study.  The needle study shows increased spontaneous activity in bilateral L4/L5/S1 muscle groups.  This can be seen in patients with lumbar radiculopathies.  Further clinical correlation is needed.     MRI L spine 2021-images reviewed.  IMPRESSION:   1.  Small left paracentral L5-S1 disc protrusion. Mild left L5-S1 foraminal stenosis.     2.  Minor degenerative disc changes at L4-L5.     MRI L spine-images reviewed  IMPRESSION:  1.  Moderate lumbar spondylosis at L4-L5 and L5-S1.  2.  No high-grade lumbar spinal canal narrowing.  3.  At L5-S1, there is mild left neural foraminal  stenosis.    MRI C spine-images reviewed  IMPRESSION:  1.  Mild cervical spondylosis without significant cervical spinal canal narrowing.  2.  Mild left neural foraminal stenosis at C4-C5.       IMPRESSION/REPORT/PLAN  Cerebrovascular accident of right pontine structure (H)  Medication overuse headache  Vertigo  Light headed  Intractable chronic migraine without aura and without status migrainosus  Bilateral leg weakness/muscle cramps versus knee pain  Left leg weakness  Low ferritin  Lumbar spinal stenosis  Medication noncompliance    This is a 55 year old female history of right pontine stroke thought to be due to vascular risk factors.  Currently she is on aspirin for stroke prevention.  Further management of vascular risk factors per primary care.  No new stroke symptoms.  Stable.  Recommend staying active.    She does complain of vertigo and most likely this is related to her stroke.  Meclizine is currently helping and should continue.  She is tried vestibular rehab without any benefit.  Could be related to headaches also.  There might not be any further treatment options available for this.  Still present.  Refill meclizine today.  Stable.    In terms of her headaches these are most suggestive of medication overuse headaches since she is overusing Tylenol.  Encourage her not to use the Tylenol more than 2-3 times per week; reemphasized this today.  With cutting down on the Tylenol there has been some improvement.  Remaining headaches would be classified as migrainous headaches.  Reemphasized.    Amitriptyline at higher doses is also helping.  Topamax was not effective/caused side effects.  Emgality is now also helping.  Continue Tylenol for abortive therapy.  Headaches worse due to not taking Emgality.  Medications refilled.  Headaches are worse with Emgality not being approved by insurance.  Will resend to the pharmacy to see if it can be covered.    She does complain of some lightheadedness and orthostatic  vitals were negative.  Most likely this is unrelated to the stroke.  Further management per primary care.  Encourage good hydration.  No change.  She is drinking plenty of water.  Monitor.    She does complain of new bilateral leg weakness/muscle cramps.  On exam she has generalized weakness related to her muscle pain.  Increased left leg weakness on exam today though this might be more knee pain than muscle pain.  EMG suggested a lumbar radiculopathy.  MRI L-spine shows moderate spinal stenosis which could be affecting the leg weakness/cramps.  Tizanidine is helping with the leg cramps and will continue.  She is already doing physical therapy.  MRI C-spine was noncontributory.  Her ferritin was low and iron replacement is also helping. Blood work/EMG was negative for myopathy.  Refilled medications.  Unclear if she is taking it or not though overall symptoms are stable.  Stable.  Medications refilled.    For knee/back issues she is now working with the spine center.    Return back in 6 months.      -     tiZANidine (ZANAFLEX) 2 MG tablet; Take 1 tablet (2 mg) by mouth 3 times daily as needed for muscle spasms.  -     ferrous sulfate (FEROSUL) 325 (65 Fe) MG tablet; Take 1 tablet (325 mg) by mouth daily (with breakfast).  -     galcanezumab-gnlm (EMGALITY) 120 MG/ML injection; Inject 1 mL (120 mg) subcutaneously every 28 days.  -     galcanezumab-gnlm (EMGALITY) 120 MG/ML injection; Inject 2 mLs (240 mg) subcutaneously every 28 days. Loading dose.  -     amitriptyline (ELAVIL) 50 MG tablet; Take 1 tablet (50 mg) by mouth at bedtime.  - Continue working with the spine center for her back issues.    Return in about 6 months (around 10/22/2025) for In-Clinic Visit (must), After testing.    Over 35 minutes were spent coordinating the care for the patient on the day of the encounter.  This includes previsit, during visit and post visit activities as documented above.  Counseling patient.  Multiple problems  reviewed/addressed.  Use of  requires extra time.  Prescription management per refractory problems.  Insurance issues.  (Activities include but not inclusive of reviewing chart, reviewing outside records, reviewing labs and imaging study results as well as the images, patient visit time including getting history and exam,  use if applicable, review of test results with the patient and coming up with a plan in a shared model, counseling patient and family, education and answering patient questions, EMR , EMR diagnosis entry and problem list management, medication reconciliation and prescription management if applicable, paperwork if applicable, printing documents and documentation of the visit activities.)        Guerreor Prescott MD  Neurologist  [facility]  Tel:- [phone]    This note was dictated using voice recognition software.  Any grammatical or context distortions are unintentional and inherent to the software.    The longitudinal plan of care for the diagnosis(es)/condition(s) as documented were addressed during this visit. Due to the added complexity in care, I will continue to support Kulwant in the subsequent management and with ongoing continuity of care.      Again, thank you for allowing me to participate in the care of your patient.        Sincerely,        Guerrero Prescott MD    Electronically signed

## 2025-04-22 NOTE — PROGRESS NOTES
NEUROLOGY OUTPATIENT PROGRESS NOTE  Apr 22, 2025     CHIEF COMPLAINT/REASON FOR VISIT/REASON FOR CONSULT  Patient presents with:  Stroke: Daughter states she is still having headaches and dizziness daily. Follow up    REASON FOR CONSULTATION- Multiple issues    REFERRAL SOURCE   Referred Self  CC  Referred Self    HISTORY OF PRESENT ILLNESS  Kulwant Amin is a 55 year old female seen today for evaluation of multiple issues.    In 2021 she was found to have a right pontine stroke.  Presenting symptoms of vertigo.  No clear etiology for the stroke was identified it was thought to be lacunar.  She does have a history of hypertension, hyperlipidemia, diabetes.  She was supposed to be on Plavix though currently is only on aspirin.  No recurrence of stroke symptoms.  She is presented to the ER since then with vertigo symptoms and her imaging studies have been negative    1.  She reports that the vertigo is there all the time.  She is using meclizine which she finds some benefit with. She has done vestibular rehab without any improvement.    2.  Further complains of headaches.  These are around-the-clock.  He is taking Tylenol every 8 hours to get rid of the headaches.  Headaches are more frontal.  They can be throbbing in nature with photophobia and phonophobia.  She does continue visual auras as well.  She is on amitriptyline at nighttime.  She feels that it might be helping with the headaches.  Does not get good sleep due to COPD.  Headaches started after her stroke.    3.  No other new strokelike symptoms.    4.  Does complain of some lightheadedness especially during the exam today.  Has been put on hydrochlorothiazide by her primary care provider.    5/24/23  Patient returns today  1.  No stroke symptoms.  Is tolerating her stroke medications  2.  Continues to have continuous headache 24/7.  Occasionally it will go away but then come back.  Is still overusing Tylenol.  Generally uses 1 tablet a day but can use  up to 3 tablets a day.  Amitriptyline did help with the headaches and now they are not getting as severe as compared to before.  3.  Still feels dizzy.  Drinking plenty of water.    8/10/23  Patient returns today  1.  No further strokelike symptoms.  Continues to complain of some dizziness.  2.  Headaches have improved with use of the amitriptyline.  She continues to have a very mild continuous headache all the time.  She is only using the Tylenol 1-2 times a week.  The more severe headaches are only 1-2 times a week as well.  Amitriptyline does help without side effects though she would like to try other medications  3.  Drinks plenty of water.  No history of kidney stones.    10/26/23  Patient returns to the  1.  No further strokelike symptoms.  2.  Continues to have headaches every day.  Some associated dizziness.  She did try the Topamax and that was thought to be causing muscle cramps and as a result she stopped it.  No benefit with the headaches.  3.  Muscle cramps in the legs are still present.  When asked where she has pain in her legs she describes all over with stretching her knee and she might have knee pain.  She also reports bilateral leg weakness.  Does have chronic back pain that has not really worsened.  Some neck stiffness as well.  No other new neurological symptoms.    1/16/24  Patient returns today.  1.  No further strokelike symptoms.  Has had been having a lot of left leg weakness.  Was seen in the ER and put on some steroids.  This is not really helped.  There is pain and spasms in the left leg along with weakness.  Continues to have back pain.  Has not been able to do the MRIs  2.  Headaches are better and she is having 5 headaches a month.  Emgality does help with the headaches.  Tylenol does help with the headaches and also prevents headaches for few days.  3.  No further symptoms.    3/18/24  Patient demonstrated  1.  No further strokelike symptoms  2.  Continues to have weakness in her  legs though muscle cramps and spasms are much better with use of tizanidine.  Is working with physical therapy which is also helping.  3.  Continues to stay on the Emgality and the amitriptyline.  Headaches are 2-3 times a week but much less severe compared to before.  No side effects with the medication.  Headaches are much more tolerable and does not want more medications  No other new concerns.    10/21/24  Patient returns today  1.  Headaches have gotten worse that now she is not taking her Emgality because she ran out of the medication.  When she was on the Emgality she was having 1-2 headaches a week.  Headaches were not very severe and Tylenol would easily abort them.  No other new provoking factors.  Remains on the amitriptyline as well  2.  Does have leg cramps for which she was using tizanidine.  It is no longer listed on that medication list though she reports that she is still using it.  Will represcribe.  3.  No further stroke symptoms  4.  Continues to have vertigo.  Discussed lack of treatment options for this.  They would like a refill on the meclizine  No other new concerns.    4/22/25  Patient returns today  1.  Headaches have gotten worse with her stopping the Emgality.  The pharmacy would not give her the Emgality.  Remains on the amitriptyline which is still helping.  Is getting good sleep at night.  Is using Tylenol for abortive therapy.  Does Tylenol around-the-clock for her body pain.  Encouraged her not to overuse the Tylenol  2.  Muscle cramps are stable similar to before with the tizanidine.  3.  She does complain of vertigo.  I suspect this might be related to her migraines.  Previously meclizine was being used for vertigo  4.  She has been working with the spine center for back pain which she feels is helping.  No other new concerns.    Previous history is reviewed and this is unchanged.    PAST MEDICAL/SURGICAL HISTORY  Past Medical History:   Diagnosis Date    Acute asthma exacerbation  2020    Anxiety     Arthritis     Asthma exacerbation 2015    Chronic obstructive pulmonary disease, unspecified COPD type (H)     COPD (chronic obstructive pulmonary disease) (H)     Coronary artery disease due to lipid rich plaque     Depression     Diabetes (H)     Epigastric pain 12/15/2021    Essential hypertension     GERD (gastroesophageal reflux disease)     Infection due to  novel coronavirus 2022    positive with COVID-19 on January 3, 2022    Latent tuberculosis 2019    Microcytic anemia 2019    Motion sickness     PONV (postoperative nausea and vomiting)     S/P coronary artery stent placement 2018    Sleep apnea     TB lung, latent     9 mos INH     Patient Active Problem List   Diagnosis    Pulmonary nodule, right    Environmental allergies    TB lung, latent    COPD (chronic obstructive pulmonary disease)/Asthma     Essential hypertension    Cataracts, bilateral    Corneal opacity    Irregular astigmatism    Anxiety    Chronic nonintractable headache, unspecified headache type    Coronary artery disease due to lipid rich plaque    Dizziness    Moderate persistent asthma    Unspecified visual loss    GERD (gastroesophageal reflux disease)    Dental caries    H/O arterial ischemic stroke    Constipation    Dysphagia    Chronic abdominal pain    Insomnia    FLACO (obstructive sleep apnea)- mild (AHI 14)    Type 2 diabetes mellitus treated with insulin (H)    Major depression, recurrent (H)    Chronic low back pain without sciatica, unspecified back pain laterality    Shortness of breath    Poorly controlled persistent asthma    Heartburn   Significant for arthritis, diabetes, high blood pressure, hyperlipidemia, stroke    FAMILY HISTORY  Family History   Problem Relation Age of Onset    Other - See Comments Mother          of an intestinal problem    Ulcers Father          of gastritis    Breast Cancer No family hx of     Ovarian Cancer No family hx of      Colon Cancer No family hx of    Negative for stroke    SOCIAL HISTORY  Social History     Tobacco Use    Smoking status: Former     Current packs/day: 0.00     Average packs/day: 1 pack/day for 30.0 years (30.0 ttl pk-yrs)     Types: Cigarettes     Start date: 1973     Quit date: 2003     Years since quittin.3     Passive exposure: Never    Smokeless tobacco: Never    Tobacco comments:     No passive exposure   Vaping Use    Vaping status: Never Used   Substance Use Topics    Alcohol use: No    Drug use: No       SYSTEMS REVIEW  Twelve-system ROS was done and other than the HPI this was negative except for back pain, joint pain, numbness and tingling, weakness/paralysis, difficulty walking, balance/coordination problems, dizziness, headaches, anxiety, cardiac/heart problems, respiratory problems, snoring loudly.  No new symptoms/issues.    MEDICATIONS  Current Outpatient Medications   Medication Sig Dispense Refill    acetaminophen (TYLENOL) 325 MG tablet Take 650 mg by mouth every 6 hours as needed for mild pain.      albuterol (PROAIR HFA/PROVENTIL HFA/VENTOLIN HFA) 108 (90 Base) MCG/ACT inhaler Inhale 2 puffs into the lungs every 6 hours as needed for shortness of breath, wheezing or cough. 18 g 11    amitriptyline (ELAVIL) 50 MG tablet Take 1 tablet (50 mg) by mouth at bedtime. 90 tablet 1    aspirin 81 MG EC tablet Take 1 tablet (81 mg) by mouth daily. 90 tablet 3    atorvastatin (LIPITOR) 80 MG tablet Take 1 tablet (80 mg) by mouth daily. 90 tablet 3    BD EZ U/F 32G X 4 MM insulin pen needle USE 3 PEN NEEDLES DAILY OR AS DIRECTED *34 DAY SUPPLY* 300 each 2    budesonide-formoterol (SYMBICORT/BREYNA) 160-4.5 MCG/ACT Inhaler Inhale 2 puffs into the lungs 2 times daily.      calcium carbonate (TUMS) 500 MG chewable tablet Take 1 chew tab by mouth 2 times daily as needed for heartburn.      colchicine (COLCRYS) 0.6 MG tablet Take 1 tablet (0.6 mg) by mouth daily. 90 tablet 3    Continuous Glucose   (FREESTYLE MONICA 14 DAY READER) MICHAEL Use to read blood sugars as per 's instructions. 1 each 0    Continuous Glucose Sensor (FREESTYLE MONICA 2 SENSOR) MISC 1 EACH EVERY 14 DAYS USE 1 SENSOR EVERY 14 DAYS. USE TO READ BLOOD SUGARS PER 'S INSTRUCTIONS. 2 each 11    CONTOUR NEXT TEST test strip USE 1 EACH AS DIRECTED 3 (THREE) TIMES A DAY. 50 strip 11    dextromethorphan (DELSYM) 30 MG/5ML liquid Take 10 mLs (60 mg) by mouth 2 times daily. 89 mL 0    diclofenac (VOLTAREN) 1 % topical gel Apply 4 g topically 4 times daily 350 g 3    dupilumab (DUPIXENT) 300 MG/2ML prefilled pen Inject 2 mLs (300 mg) subcutaneously every 14 days. 4 mL 2    ferrous sulfate (FEROSUL) 325 (65 Fe) MG tablet Take 1 tablet (325 mg) by mouth daily (with breakfast). 90 tablet 4    FLUoxetine (PROZAC) 20 MG capsule TAKE 1 CAPSULE (20 MG) BY MOUTH DAILY. 90 capsule 1    gabapentin (NEURONTIN) 600 MG tablet TAKE 1 PILL (600 MG) BY MOUTH 3 TIMES DAILY 90 tablet 3    galcanezumab-gnlm (EMGALITY) 120 MG/ML injection Inject 1 mL (120 mg) subcutaneously every 28 days. 1 mL 3    galcanezumab-gnlm (EMGALITY) 120 MG/ML injection Inject 2 mLs (240 mg) subcutaneously every 28 days. Loading dose. 2 mL 0    Global Inject Ease Lancets 30G MISC USE 1 EACH AS DIRECTED 3 (THREE) TIMES A DAY. DISPENSE BRAND PER PATIENT'S INSURANCE AT PHARMACY DISCRETION.(E11.9) *34 DAYS* 200 each 11    insulin aspart (NOVOLOG PEN) 100 UNIT/ML pen Inject 14 Units subcutaneously 2 times daily (before meals). 45 mL 3    insulin glargine (LANTUS PEN) 100 UNIT/ML pen Inject 40 Units subcutaneously every morning. 45 mL 3    ipratropium - albuterol 0.5 mg/2.5 mg/3 mL (DUONEB) 0.5-2.5 (3) MG/3ML neb solution Take 1 vial (3 mLs) by nebulization 4 times daily. 360 mL 11    Lidocaine (LIDOCARE) 4 % Patch Place 1 patch onto the skin every 24 hours. To prevent lidocaine toxicity, patient should be patch free for 12 hrs daily. 30 patch 1    lisinopril (ZESTRIL) 5  MG tablet TAKE 1 TABLET (5 MG) BY MOUTH DAILY. 30 tablet 2    meclizine (ANTIVERT) 25 MG tablet Take 1 tablet (25 mg) by mouth 3 times daily as needed for dizziness. 90 tablet 1    metFORMIN (GLUCOPHAGE XR) 500 MG 24 hr tablet Take 2 tablets (1,000 mg) by mouth 2 times daily (with meals). 360 tablet 3    metoprolol tartrate (LOPRESSOR) 25 MG tablet TAKE ONE-HALF TABLET (12.5 MG TOTAL) BY MOUTH 2 (TWO) TIMES A DAY FOR BLOOD PRESSURE 45 tablet 3    montelukast (SINGULAIR) 10 MG tablet TAKE 1 TABLET (10 MG) BY MOUTH AT BEDTIME 30 tablet 9    NEXIUM 40 MG DR capsule Take 40 mg by mouth every morning (before breakfast).      polyethylene glycol (MIRALAX) 17 GM/Dose powder Take 17 g (1 Capful) by mouth daily 238 g 1    semaglutide (OZEMPIC) 2 MG/3ML pen Inject 0.25 mg subcutaneously every 7 days. 3 mL 3    sennosides (SENOKOT) 8.6 MG tablet Take 1 tablet by mouth daily as needed for constipation. 30 tablet 3    sucralfate (CARAFATE) 1 GM/10ML suspension Take 10 mLs (1 g) by mouth 4 times daily. (Patient taking differently: Take 1 g by mouth 4 times daily as needed.) 3600 mL 1    tiZANidine (ZANAFLEX) 2 MG tablet Take 1 tablet (2 mg) by mouth 3 times daily as needed for muscle spasms. 270 tablet 1    vitamin D3 (CHOLECALCIFEROL) 10 MCG (400 UNIT) capsule Take 1 capsule (400 Units) by mouth daily. 90 capsule 3     No current facility-administered medications for this visit.        PHYSICAL EXAMINATION  VITALS: /51   Pulse (!) 122   Ht 1.524 m (5')   Wt 56.7 kg (125 lb)   BMI 24.41 kg/m    GENERAL: Healthy appearing, alert, no acute distress, normal habitus.  CARDIOVASCULAR: Extremities warm and well perfused. Pulses present.   NEUROLOGICAL:  Patient is awake and oriented to self, place and time.  Attention span is normal.  Memory is grossly intact.  Language is fluent and follows commands appropriately.  Appropriate fund of knowledge. Cranial nerves 2-12 are intact. There is no pronator drift.  Motor exam shows   4/5 strength in all extremities-effort dependent.  Generalized weakness due to pain all over.  Tone is symmetric bilaterally in upper and lower extremities.  Previously reflexes are symmetric and 1+ in upper extremities and lower extremities. Sensory exam is grossly intact to light touch, pin prick and vibration.  Finger to nose and heel to shin is without dysmetria.  Romberg is negative.  Gait is normal and the patient is able to do tandem walk and walk on toes and heels with mild difficulty.  Orthostatic vitals    Exam shows some worsening left hip flexion weakness related to pain.  Possibly this is more knee pain than neurological pain.  Exam stable.  Significant back pain affecting ambulation/: Leg weakness.    DIAGNOSTICS  MRI 2022  IMPRESSION:  1.  No acute intracranial abnormalities identified.  2.  Minor chronic small vessel ischemic change, otherwise unremarkable contrast-enhanced MRI of the brain.    MRA                                                               IMPRESSION:  1.  Normal MRA [place].      MRA neck  IMPRESSION:  1.  Normal neck MRA.    Cardiac event monitor    ECHo  Left ventricular size, wall motion and function are normal. The ejection  fraction is 55-60%.  There is borderline anterior, septal, and apical wall hypokinesis.  Normal right ventricle size and systolic function.  No hemodynamically significant valvular abnormalities on 2D or color flow  Imaging.      CTA 2021  HEAD CT:  1.  No evidence of acute hemorrhage, mass effect, or acute vascular territorial infarction.  2.  Severe left sphenoid sinus disease.     HEAD CTA:   1.  No evidence of proximal large vessel occlusion or flow-limiting stenosis.  2.  Dorsally directed 2 mm contour irregularity arising from the distal left supraclinoid ICA may represent a tiny posterior communicating artery aneurysm.     NECK CTA:  1.  No hemodynamically significant stenosis based on NASCET criteria.  2.  Mild left V1 segment  stenosis.  3.  No evidence for dissection.      MRI 2020  IMPRESSION:  MRI BRAIN:  1. No finding for acute infarct, hemorrhage, or mass.  2. There are a few very small foci of FLAIR hyperintensity within the cerebral white matter, nonspecific but compatible with small areas of gliosis and/or chronic myelin loss.     MRA [place]:  1. Congenital variation of the [place] without vascular cutoff, aneurysm, or flow-limiting stenosis.      MRI 2021  HEAD MRI:   1.  Tiny linear focus of diffusion restriction within the right dorsal diomedes suspicious for acute small vessel infarct. This is new compared to 12/07/2020.  2.  No hemorrhagic transformation or mass effect. No additional infarct identified.  3.  Mild presumed microvascular ischemic change within the cerebral white matter.     HEAD MRA:   1.  No aneurysm, high flow AVM or significant stenosis identified.  2.  Variant [place] anatomy as above.     NECK MRA:  1.  Evaluation degraded by suboptimal timing of the contrast bolus and significant venous contamination the gadolinium bolus MRA.  2.  No hemodynamically significant stenosis in the carotid circulation by NASCET criteria.  3.  Poor visualization of the left vertebral artery origin and proximal left V1 segment. This may be artifactual, but it is difficult to exclude high-grade stenosis in this region.      MRI 2021  IMPRESSION:  1.  No mass, hemorrhage, or recent infarct identified.  2.  Diffusion abnormality involving the right ventral diomedes seen on 01/13/2021 is no longer identified. No definite residual signal abnormality in this location to confirm prior infarct. This may relate to small size and slice selection.  3.  Inflammatory changes of the paranasal sinuses including bilateral maxillary air-fluid levels. Correlate with clinical evidence for sinusitis.      RELEVANT LABS  Component      Latest Ref Rng & Units 11/30/2022 2/5/2023   WBC      4.0 - 11.0 10e3/uL  9.3   RBC Count       3.80 - 5.20 10e6/uL  4.59   Hemoglobin      11.7 - 15.7 g/dL  11.8   Hematocrit      35.0 - 47.0 %  38.1   MCV      78 - 100 fL  83   MCH      26.5 - 33.0 pg  25.7 (L)   MCHC      31.5 - 36.5 g/dL  31.0 (L)   RDW      10.0 - 15.0 %  15.1 (H)   Platelet Count      150 - 450 10e3/uL  228   % Neutrophils      %  69   % Lymphocytes      %  14   % Monocytes      %  6   % Eosinophils      %  11   % Basophils      %  0   % Immature Granulocytes      %  0   NRBCs per 100 WBC      <1 /100  0   Absolute Neutrophils      1.6 - 8.3 10e3/uL  6.4   Absolute Lymphocytes      0.8 - 5.3 10e3/uL  1.3   Absolute Monocytes      0.0 - 1.3 10e3/uL  0.6   Absolute Eosinophils      0.0 - 0.7 10e3/uL  1.0 (H)   Absolute Basophils      0.0 - 0.2 10e3/uL  0.0   Absolute Immature Granulocytes      <=0.4 10e3/uL  0.0   Absolute NRBCs      10e3/uL  0.0   Sodium      136 - 145 mmol/L  138   Potassium      3.4 - 5.3 mmol/L  4.2   Chloride      98 - 107 mmol/L  102   Carbon Dioxide (CO2)      22 - 29 mmol/L  25   Anion Gap      7 - 15 mmol/L  11   Urea Nitrogen      6.0 - 20.0 mg/dL  10.6   Creatinine      0.51 - 0.95 mg/dL  0.58   Calcium      8.6 - 10.0 mg/dL  9.5   Glucose      70 - 99 mg/dL  156 (H)   GFR Estimate      >60 mL/min/1.73m2  >90   Hemoglobin A1C      0.0 - 5.6 % 8.6 (H)      OUTSIDE RECORDS  Outside referral notes and chart notes were reviewed and pertinent information has been summarized (in addition to the HPI):- Dr. Multani notes reviewed      ER notes above reviewed    Component      Latest Ref Rng 9/14/2023  9:21 AM 10/1/2023  9:42 AM   Sodium      135 - 145 mmol/L  136    Potassium      3.4 - 5.3 mmol/L  4.3    Chloride      98 - 107 mmol/L  102    Carbon Dioxide (CO2)      22 - 29 mmol/L  22    Anion Gap      7 - 15 mmol/L  12    Urea Nitrogen      6.0 - 20.0 mg/dL  9.2    Creatinine      0.51 - 0.95 mg/dL  0.53    GFR Estimate      >60 mL/min/1.73m2  >90    Calcium      8.6 - 10.0 mg/dL  9.1    Glucose      70 - 99 mg/dL   201 (H)    WBC      4.0 - 11.0 10e3/uL  7.2    RBC Count      3.80 - 5.20 10e6/uL  4.73    Hemoglobin      11.7 - 15.7 g/dL  11.6 (L)    Hematocrit      35.0 - 47.0 %  38.4    MCV      78 - 100 fL  81    MCH      26.5 - 33.0 pg  24.5 (L)    MCHC      31.5 - 36.5 g/dL  30.2 (L)    RDW      10.0 - 15.0 %  15.7 (H)    Platelet Count      150 - 450 10e3/uL  230    CRP Inflammation      <5.00 mg/L 3.20        Legend:  (H) High  (L) Low    LABS  Component      Latest Ref Rn 11/7/2023  10:46 AM   Sodium      135 - 145 mmol/L 138    Potassium      3.4 - 5.3 mmol/L 4.0    Carbon Dioxide (CO2)      22 - 29 mmol/L 25    Anion Gap      7 - 15 mmol/L 13    Urea Nitrogen      6.0 - 20.0 mg/dL 13.3    Creatinine      0.51 - 0.95 mg/dL 0.59    GFR Estimate      >60 mL/min/1.73m2 >90    Calcium      8.6 - 10.0 mg/dL 9.3    Chloride      98 - 107 mmol/L 100    Glucose      70 - 99 mg/dL 184 (H)    Alkaline Phosphatase      35 - 104 U/L 116 (H)    AST      0 - 45 U/L 48 (H)    ALT      0 - 50 U/L 41    Protein Total      6.4 - 8.3 g/dL 7.1    Albumin      3.5 - 5.2 g/dL 4.1    Bilirubin Total      <=1.2 mg/dL 0.3    KATY-1 (Histidyl-tRNA Synthetase) Bere IgG      0 - 40 AU/mL 1    PL-12 (alanyl-tRNA synthetase) Antibody      Negative  Negative    PL-7 (threonyl-tRNA synthetase) Antibody      Negative  Negative    EJ (glycyl - tRNA synthetase) Antibody      Negative  Negative    OJ (isoleucyl-tRNA synthetase) Antibody      Negative  Negative    SRP (Signal Recognition Particle) Antibody      Negative  Negative    Mi-2 (nuclear helicase protein) Antibody      Negative  Negative    P155/140 (TIF1-gamma) Antibody      Negative  Negative    TIF-1 Gamma Antibody      Negative  Negative    SAE1 (SUMO activating enzyme) Bere      Negative  Negative    MDA5 (CADM 140) Bere      Negative  Negative    NXP-2 (Nuclear matrix protein 2) Bere      Negative  Negative    Myositis Interp See Note    Albumin Fraction      3.7 - 5.1 g/dL 3.8    Alpha 1  Fraction      0.2 - 0.4 g/dL 0.3    Alpha 2 Fraction      0.5 - 0.9 g/dL 0.7    Beta Fraction      0.6 - 1.0 g/dL 0.9    Gamma Fraction      0.7 - 1.6 g/dL 1.0    Monoclonal Peak      <=0.0 g/dL 0.0    ELP Interpretation: Essentially normal electrophoretic pattern. No obvious monoclonal proteins seen. Pathologic significance requires clinical correlation. Marlene Zamudio M.D., Ph.D.    Vitamin B12      232 - 1,245 pg/mL 602    Vitamin B1 Whole Blood Level      70 - 180 nmol/L 111    TSH      0.30 - 4.20 uIU/mL 0.35    Hemoglobin A1C      0.0 - 5.6 % 11.1 (H)    Magnesium      1.7 - 2.3 mg/dL 1.7    CK Total      26 - 192 U/L 59    Ferritin      11 - 328 ng/mL 26    Immunofixation ELP No monoclonal protein seen on immunofixation. Pathologic significance requires clinical correlation. Marlene Zamudio M.D., Ph.D.    Total Protein Serum for ELP      6.4 - 8.3 g/dL 6.7       Legend:  (H) High  ER notes reviewed.    CLINICAL INTERPRETATION:  This is an abnormal nerve conduction and EMG study.  The needle study shows increased spontaneous activity in bilateral L4/L5/S1 muscle groups.  This can be seen in patients with lumbar radiculopathies.  Further clinical correlation is needed.     MRI L spine 2021-images reviewed.  IMPRESSION:   1.  Small left paracentral L5-S1 disc protrusion. Mild left L5-S1 foraminal stenosis.     2.  Minor degenerative disc changes at L4-L5.     MRI L spine-images reviewed  IMPRESSION:  1.  Moderate lumbar spondylosis at L4-L5 and L5-S1.  2.  No high-grade lumbar spinal canal narrowing.  3.  At L5-S1, there is mild left neural foraminal stenosis.    MRI C spine-images reviewed  IMPRESSION:  1.  Mild cervical spondylosis without significant cervical spinal canal narrowing.  2.  Mild left neural foraminal stenosis at C4-C5.       IMPRESSION/REPORT/PLAN  Cerebrovascular accident of right pontine structure (H)  Medication overuse headache  Vertigo  Light headed  Intractable chronic migraine without aura and  without status migrainosus  Bilateral leg weakness/muscle cramps versus knee pain  Left leg weakness  Low ferritin  Lumbar spinal stenosis  Medication noncompliance    This is a 55 year old female history of right pontine stroke thought to be due to vascular risk factors.  Currently she is on aspirin for stroke prevention.  Further management of vascular risk factors per primary care.  No new stroke symptoms.  Stable.  Recommend staying active.    She does complain of vertigo and most likely this is related to her stroke.  Meclizine is currently helping and should continue.  She is tried vestibular rehab without any benefit.  Could be related to headaches also.  There might not be any further treatment options available for this.  Still present.  Refill meclizine today.  Stable.    In terms of her headaches these are most suggestive of medication overuse headaches since she is overusing Tylenol.  Encourage her not to use the Tylenol more than 2-3 times per week; reemphasized this today.  With cutting down on the Tylenol there has been some improvement.  Remaining headaches would be classified as migrainous headaches.  Reemphasized.    Amitriptyline at higher doses is also helping.  Topamax was not effective/caused side effects.  Emgality is now also helping.  Continue Tylenol for abortive therapy.  Headaches worse due to not taking Emgality.  Medications refilled.  Headaches are worse with Emgality not being approved by insurance.  Will resend to the pharmacy to see if it can be covered.    She does complain of some lightheadedness and orthostatic vitals were negative.  Most likely this is unrelated to the stroke.  Further management per primary care.  Encourage good hydration.  No change.  She is drinking plenty of water.  Monitor.    She does complain of new bilateral leg weakness/muscle cramps.  On exam she has generalized weakness related to her muscle pain.  Increased left leg weakness on exam today though this  might be more knee pain than muscle pain.  EMG suggested a lumbar radiculopathy.  MRI L-spine shows moderate spinal stenosis which could be affecting the leg weakness/cramps.  Tizanidine is helping with the leg cramps and will continue.  She is already doing physical therapy.  MRI C-spine was noncontributory.  Her ferritin was low and iron replacement is also helping. Blood work/EMG was negative for myopathy.  Refilled medications.  Unclear if she is taking it or not though overall symptoms are stable.  Stable.  Medications refilled.    For knee/back issues she is now working with the spine center.    Return back in 6 months.      -     tiZANidine (ZANAFLEX) 2 MG tablet; Take 1 tablet (2 mg) by mouth 3 times daily as needed for muscle spasms.  -     ferrous sulfate (FEROSUL) 325 (65 Fe) MG tablet; Take 1 tablet (325 mg) by mouth daily (with breakfast).  -     galcanezumab-gnlm (EMGALITY) 120 MG/ML injection; Inject 1 mL (120 mg) subcutaneously every 28 days.  -     galcanezumab-gnlm (EMGALITY) 120 MG/ML injection; Inject 2 mLs (240 mg) subcutaneously every 28 days. Loading dose.  -     amitriptyline (ELAVIL) 50 MG tablet; Take 1 tablet (50 mg) by mouth at bedtime.  - Continue working with the spine center for her back issues.    Return in about 6 months (around 10/22/2025) for In-Clinic Visit (must), After testing.    Over 35 minutes were spent coordinating the care for the patient on the day of the encounter.  This includes previsit, during visit and post visit activities as documented above.  Counseling patient.  Multiple problems reviewed/addressed.  Use of  requires extra time.  Prescription management per refractory problems.  Insurance issues.  (Activities include but not inclusive of reviewing chart, reviewing outside records, reviewing labs and imaging study results as well as the images, patient visit time including getting history and exam,  use if applicable, review of test results  with the patient and coming up with a plan in a shared model, counseling patient and family, education and answering patient questions, EMR , EMR diagnosis entry and problem list management, medication reconciliation and prescription management if applicable, paperwork if applicable, printing documents and documentation of the visit activities.)        Guerrero Prescott MD  Neurologist  [facility]  Tel:- [phone]    This note was dictated using voice recognition software.  Any grammatical or context distortions are unintentional and inherent to the software.    The longitudinal plan of care for the diagnosis(es)/condition(s) as documented were addressed during this visit. Due to the added complexity in care, I will continue to support Kulwant in the subsequent management and with ongoing continuity of care.

## 2025-04-22 NOTE — NURSING NOTE
Chief Complaint   Patient presents with    Stroke     Daughter states she is still having headaches and dizziness daily. Follow up     Jayshree Perla on 4/22/2025 at 10:47 AM

## 2025-04-23 ENCOUNTER — PATIENT OUTREACH (OUTPATIENT)
Dept: CARE COORDINATION | Facility: CLINIC | Age: 55
End: 2025-04-23
Payer: COMMERCIAL

## 2025-04-24 ENCOUNTER — TELEPHONE (OUTPATIENT)
Dept: PULMONOLOGY | Facility: CLINIC | Age: 55
End: 2025-04-24
Payer: COMMERCIAL

## 2025-04-24 NOTE — TELEPHONE ENCOUNTER
[facility] Call Center    Phone Message    May a detailed message be left on voicemail: yes     Reason for Call: Other: Follow-up   Billy is requesting a call back from the care team please. Thank you.     Action Taken: Other: Pulm    Travel Screening: Not Applicable     Date of Service: 4/24/25

## 2025-04-24 NOTE — TELEPHONE ENCOUNTER
Called and spoke with Billy.  She states she just wanted to make an appointment for Kulwant and that has already been taken care of.  Nothing further needed.

## 2025-04-30 SDOH — HEALTH STABILITY: PHYSICAL HEALTH: ON AVERAGE, HOW MANY MINUTES DO YOU ENGAGE IN EXERCISE AT THIS LEVEL?: 0 MIN

## 2025-04-30 SDOH — HEALTH STABILITY: PHYSICAL HEALTH: ON AVERAGE, HOW MANY DAYS PER WEEK DO YOU ENGAGE IN MODERATE TO STRENUOUS EXERCISE (LIKE A BRISK WALK)?: 0 DAYS

## 2025-04-30 ASSESSMENT — PATIENT HEALTH QUESTIONNAIRE - PHQ9
SUM OF ALL RESPONSES TO PHQ QUESTIONS 1-9: 15
10. IF YOU CHECKED OFF ANY PROBLEMS, HOW DIFFICULT HAVE THESE PROBLEMS MADE IT FOR YOU TO DO YOUR WORK, TAKE CARE OF THINGS AT HOME, OR GET ALONG WITH OTHER PEOPLE: SOMEWHAT DIFFICULT
SUM OF ALL RESPONSES TO PHQ QUESTIONS 1-9: 15

## 2025-04-30 ASSESSMENT — SOCIAL DETERMINANTS OF HEALTH (SDOH): HOW OFTEN DO YOU GET TOGETHER WITH FRIENDS OR RELATIVES?: ONCE A WEEK

## 2025-05-01 ENCOUNTER — TELEPHONE (OUTPATIENT)
Dept: ALLERGY | Facility: CLINIC | Age: 55
End: 2025-05-01

## 2025-05-01 ENCOUNTER — TELEPHONE (OUTPATIENT)
Dept: FAMILY MEDICINE | Facility: CLINIC | Age: 55
End: 2025-05-01

## 2025-05-01 ENCOUNTER — OFFICE VISIT (OUTPATIENT)
Dept: FAMILY MEDICINE | Facility: CLINIC | Age: 55
End: 2025-05-01

## 2025-05-01 VITALS
BODY MASS INDEX: 25.08 KG/M2 | DIASTOLIC BLOOD PRESSURE: 78 MMHG | HEIGHT: 60 IN | WEIGHT: 127.75 LBS | TEMPERATURE: 98.5 F | HEART RATE: 105 BPM | OXYGEN SATURATION: 95 % | RESPIRATION RATE: 20 BRPM | SYSTOLIC BLOOD PRESSURE: 120 MMHG

## 2025-05-01 DIAGNOSIS — I10 ESSENTIAL HYPERTENSION: ICD-10-CM

## 2025-05-01 DIAGNOSIS — E11.9 TYPE 2 DIABETES MELLITUS TREATED WITH INSULIN (H): ICD-10-CM

## 2025-05-01 DIAGNOSIS — Z00.00 ANNUAL PHYSICAL EXAM: Primary | ICD-10-CM

## 2025-05-01 DIAGNOSIS — G47.33 OSA (OBSTRUCTIVE SLEEP APNEA): Chronic | ICD-10-CM

## 2025-05-01 DIAGNOSIS — Z79.4 TYPE 2 DIABETES MELLITUS TREATED WITH INSULIN (H): ICD-10-CM

## 2025-05-01 DIAGNOSIS — J44.9 CHRONIC OBSTRUCTIVE PULMONARY DISEASE, UNSPECIFIED COPD TYPE (H): Chronic | ICD-10-CM

## 2025-05-01 LAB
CHOLEST SERPL-MCNC: 126 MG/DL
EST. AVERAGE GLUCOSE BLD GHB EST-MCNC: 232 MG/DL
HBA1C MFR BLD: 9.7 % (ref 0–5.6)
HDLC SERPL-MCNC: 60 MG/DL
HOLD SPECIMEN: NORMAL
HOLD SPECIMEN: NORMAL
LDLC SERPL CALC-MCNC: 44 MG/DL
NONHDLC SERPL-MCNC: 66 MG/DL
TRIGL SERPL-MCNC: 109 MG/DL

## 2025-05-01 RX ORDER — AZITHROMYCIN 250 MG/1
TABLET, FILM COATED ORAL
Qty: 6 TABLET | Refills: 0 | Status: SHIPPED | OUTPATIENT
Start: 2025-05-01 | End: 2025-05-06

## 2025-05-01 NOTE — TELEPHONE ENCOUNTER
[facility] Call Center    Phone Message    May a detailed message be left on voicemail: yes     Reason for Call: Patient's daughter in law Billy is calling to schedule an appt where she can learn to give patient Dupixent injections - please call back [phone] Thank you    Action Taken: Other: [facility] ALL    Travel Screening: Not Applicable     Date of Service:

## 2025-05-01 NOTE — PROGRESS NOTES
"Preventive Care Visit  [facility] RYAN Cardoza MD, Family Medicine  May 1, 2025      Annual physical exam      Type 2 diabetes mellitus treated with insulin (H)  No med changes today.  Currently on oral steroid, elevated blood sugar.  Will continue to monitor.  - Hemoglobin A1c; Future  - Lipid panel  - Hemoglobin A1c    Chronic obstructive pulmonary disease, unspecified COPD type (H)  On oral prednisone prescribed by pulmonology.  Will add Z-Isaiah.  - azithromycin (ZITHROMAX) 250 MG tablet; Take 2 tablets (500 mg) by mouth daily for 1 day, THEN 1 tablet (250 mg) daily for 4 days.    FLACO (obstructive sleep apnea)- mild (AHI 14)  Telephone number for sleep medicine provided to call and make an appointment.    Essential hypertension  Lisinopril was discontinued.  Currently on Lopressor 25 mg twice a day.  Normal blood pressure today.    Doug Blum is a 55 year old, presenting for the following:    The patient is here for physical.    She has multiple chronic medical conditions and seeing multiple specialists.  She is complaining of worsening cough in the past month.  No fever.  Cough is mostly nonproductive.  She was recently given oral prednisone by pulmonology, blood sugars been elevated while on prednisone.  She is using metformin 1000 mg twice a day and Lantus 40 units daily.   She was seen by sleep medicine in 2022.  CPAP machine was provided but was returned due to\" not functioning well\" per daughter-in-law.  She wants to see sleep medicine again.      Advance Care Planning    Discussed advance care planning with patient; however, patient declined at this time.        4/30/2025   General Health   How would you rate your overall physical health? (!) POOR   Feel stress (tense, anxious, or unable to sleep) Only a little   (!) STRESS CONCERN      4/30/2025   Nutrition   Three or more servings of calcium each day? Yes   Diet: Regular (no restrictions)   How many servings of fruit and " vegetables per day? (!) 2-3   How many sweetened beverages each day? 0-1         2025   Exercise   Days per week of moderate/strenous exercise 0 days   Average minutes spent exercising at this level 0 min   (!) EXERCISE CONCERN      2025   Social Factors   Frequency of gathering with friends or relatives Once a week   Worry food won't last until get money to buy more No   Food not last or not have enough money for food? No   Do you have housing? (Housing is defined as stable permanent housing and does not include staying outside in a car, in a tent, in an abandoned building, in an overnight shelter, or couch-surfing.) Yes   Are you worried about losing your housing? No   Lack of transportation? No   Unable to get utilities (heat,electricity)? No         2025   Fall Risk   Gait Speed Test (Document in seconds) 6   Gait Speed Test Interpretation Greater than 5.01 seconds - ABNORMAL          2025   Dental   Dentist two times every year? Yes       Today's PHQ-9 Score:       2025     2:15 PM   PHQ-9 SCORE   PHQ-9 Total Score MyChart 15 (Moderately severe depression)   PHQ-9 Total Score 15        Proxy-reported         2025   Substance Use   Alcohol more than 3/day or more than 7/wk No   Do you use any other substances recreationally? No     Social History     Tobacco Use    Smoking status: Former     Current packs/day: 0.00     Average packs/day: 1 pack/day for 30.0 years (30.0 ttl pk-yrs)     Types: Cigarettes     Start date: 1973     Quit date: 2003     Years since quittin.3     Passive exposure: Never    Smokeless tobacco: Never    Tobacco comments:     No passive exposure   Vaping Use    Vaping status: Never Used   Substance Use Topics    Alcohol use: No    Drug use: No           2/15/2024   LAST FHS-7 RESULTS   1st degree relative breast or ovarian cancer No   Any relative bilateral breast cancer No   Any male have breast cancer No   Any ONE woman have BOTH breast AND  ovarian cancer No   Any woman with breast cancer before 50yrs No   2 or more relatives with breast AND/OR ovarian cancer No   2 or more relatives with breast AND/OR bowel cancer No        Mammogram Screening - Mammogram every 1-2 years updated in Health Maintenance based on mutual decision making        4/30/2025   STI Screening   New sexual partner(s) since last STI/HIV test? No     History of abnormal Pap smear: No - age 30- 64 PAP with HPV every 5 years recommended        8/31/2017    12:21 PM 2/19/2014    12:00 AM   PAP / HPV   PAP Negative for squamous intraepithelial lesion or malignancy  Electronically signed by Marlene Yeh CT (ASCP) on 9/13/2017 at  3:55 PM       PAP (Historical)  NIL      ASCVD Risk   The ASCVD Risk score (Doris CANDELARIO, et al., 2019) failed to calculate for the following reasons:    Risk score cannot be calculated because patient has a medical history suggesting prior/existing ASCVD           Reviewed and updated as needed this visit by Provider                          Review of Systems  CONSTITUTIONAL: NEGATIVE for fever, chills, change in weight  ENT/MOUTH: NEGATIVE for ear, mouth and throat problems  CV: NEGATIVE for chest pain/chest pressure     Objective    Exam  /78 (BP Location: Left arm, Patient Position: Sitting, Cuff Size: Adult Regular)   Pulse 105   Temp 98.5  F (36.9  C) (Oral)   Resp 20   Ht 1.524 m (5')   Wt 57.9 kg (127 lb 12 oz)   SpO2 95%   BMI 24.95 kg/m     Estimated body mass index is 24.95 kg/m  as calculated from the following:    Height as of this encounter: 1.524 m (5').    Weight as of this encounter: 57.9 kg (127 lb 12 oz).    Physical Exam  GENERAL: alert and no distress  NECK: Supple  RESP: Coarse breath sounds bilaterally with wheezing both lung fields.  CV: regular rates and rhythm and no murmur, click or rub  ABDOMEN: soft, nontender  PSYCH: mentation appears normal  Diabetic foot exam: no trophic changes or ulcerative  lesions        Signed Electronically by: Adrien Cardoza MD    Answers submitted by the patient for this visit:  Patient Health Questionnaire (Submitted on 4/30/2025)  If you checked off any problems, how difficult have these problems made it for you to do your work, take care of things at home, or get along with other people?: Somewhat difficult  PHQ9 TOTAL SCORE: 15

## 2025-05-01 NOTE — TELEPHONE ENCOUNTER
Called [facility] and arnold Castillo to discontinue Lisinopril per . Per Jonathan prescription was not on file.

## 2025-05-06 ENCOUNTER — ALLIED HEALTH/NURSE VISIT (OUTPATIENT)
Dept: ALLERGY | Facility: CLINIC | Age: 55
End: 2025-05-06

## 2025-05-06 DIAGNOSIS — Z71.89 ENCOUNTER FOR INJECTION EDUCATION: Primary | ICD-10-CM

## 2025-05-06 PROCEDURE — 99207 PR NO CHARGE NURSE ONLY: CPT

## 2025-05-06 NOTE — PROGRESS NOTES
"Pt presented to clinic with Billy (daughter-in-law) for Dupixent education. Went over instructions for administration of Dupixent injections.  Explained dosage instructions to patient, including loading and maintenance doses.  Went over correct storage of Dupixent syringes and materials needed for injection. Taught proper subcutaneous injection site selection, including staying at least 2 inches away from navel, rotating injection sites, and not injecting in skin that is tender, damaged or scarred.  Discussed site preparation.  Patient and daughter-in-law were taught how to pinch a fold of skin at injection site, to insert auto-injector completely into fold, and to push pen down completely until an audible \"click\" is heard.  Patient and daughter-in-law educated to continue to hold auto-injector pen in fold until medication window is completely yellow, another audible \"click\" is heard, and to continue to hold pen in place for additional 5 seconds after this second \"click\" is heard. Discussed proper site care, and how to dispose of used syringe.     Billy (daughter-in-law) had no questions before leaving clinic. Advised to call our clinic or send us a [employer] message if has any questions about injection administration, or anything else regarding the prescription.    Dona Bowman RN      "

## 2025-05-14 DIAGNOSIS — K59.00 CONSTIPATION, UNSPECIFIED CONSTIPATION TYPE: ICD-10-CM

## 2025-05-14 RX ORDER — POLYETHYLENE GLYCOL 3350 17 G/17G
1 POWDER, FOR SOLUTION ORAL DAILY
Qty: 238 G | Refills: 1 | Status: SHIPPED | OUTPATIENT
Start: 2025-05-14

## 2025-05-14 NOTE — TELEPHONE ENCOUNTER
Medication Question or Refill        What medication are you calling about (include dose and sig)?: Mralax powder    Preferred Pharmacy:   [facility] - [place], [place] - [address]  [address]  [address]  [place] [address]  Phone: [phone] Fax: [phone]      Controlled Substance Agreement on file:   CSA -- Patient Level:    CSA: None found at the patient level.       Who prescribed the medication?: PCP    Do you need a refill? Yes    When did you use the medication last? Around 2 weeks ago    Patient offered an appointment? No    Do you have any questions or concerns?  Yes: requesting refill      Could we [facility] this information to you in [facility] or would you prefer to receive a phone call?:   Patient would prefer a phone call   Okay to leave a detailed message?: Yes at Cell number on file:    Telephone Information:   Mobile [phone]

## 2025-05-19 DIAGNOSIS — R52 PAIN: Primary | ICD-10-CM

## 2025-05-19 NOTE — TELEPHONE ENCOUNTER
Clinic RN: Please contact patient because the medication is listed as historical or discontinued. Confirm patient is taking this medication. Document findings and route refill encounter to provider for approval or denial.    Also Requested sig does not match sig on last order.

## 2025-05-28 ENCOUNTER — TELEPHONE (OUTPATIENT)
Dept: PULMONOLOGY | Facility: CLINIC | Age: 55
End: 2025-05-28
Payer: COMMERCIAL

## 2025-05-28 DIAGNOSIS — J45.51 SEVERE PERSISTENT ASTHMA WITH EXACERBATION (H): Primary | ICD-10-CM

## 2025-05-28 RX ORDER — PREDNISONE 10 MG/1
TABLET ORAL
Qty: 30 TABLET | Refills: 0 | Status: SHIPPED | OUTPATIENT
Start: 2025-05-28

## 2025-05-28 NOTE — TELEPHONE ENCOUNTER
Called and spoke with Billy.  Patient having increased coughing and wheezing.  Would like to start the prednisone for this.      Action plan sent to pharmacy:  12-day taper.

## 2025-05-28 NOTE — TELEPHONE ENCOUNTER
[facility] Call Center    Phone Message    May a detailed message be left on voicemail: yes     Reason for Call: Medication Refill Request    Has the patient contacted the pharmacy for the refill? Yes   Name of medication being requested: Prednisone  Provider who prescribed the medication: Dr. Duong  Pharmacy: [facility]  Date medication is needed: ASAP patient is completely out       Action Taken: Other: Pulm    Travel Screening: Not Applicable     Date of Service:

## 2025-06-02 NOTE — PROGRESS NOTES
Medication Therapy Management (MTM) Encounter    ASSESSMENT:                            Medication Adherence/Access: See below for considerations.    1. Type 2 diabetes mellitus treated with insulin (H)  A1c not at goal <7%. Patient would benefit from starting ozempic as prescribed at last MTM visit, PA request submitted. Prescription for ozempic sent to [facility] as patient would likely have a $25 dispensing fee if filled at [facility]. Patient would also benefit from an increased dose of novolog to help minimize blood sugar spikes as well as an increased dose of lantus.     2. Essential hypertension/CAD/SVT  BP at goal <130/80 mmHg. No medication changes recommended today as patient's dizziness is improving. In the future if blood pressure is elevated, could consider a trial of an ARB while monitoring closely for increased dizziness.     3. Hyperlipidemia, unspecified hyperlipidemia type  LDL at goal, continue current therapy. Continue medications with no changes.   Managed by cardiology.      4. Severe persistent asthma/allergies  Managed by pulmonology and allergy.   Greatly improved with dupixent. No medication changes recommended today.     5. Constipation, unspecified constipation type  Patient would benefit from using miralax up to twice daily for hard stools.      6. Gastroesophageal reflux disease without esophagitis  Managed by GI. Stable.      7. Chronic nonintractable headache, unspecified headache type  Managed by neurology. Greatly improved with emgality. No medication changes recommended today.      8. Recurrent major depressive disorder, remission status unspecified (H24)  Stable.      9. Takes dietary supplements  Stable.      10. Pericarditis, unspecified chronicity, unspecified type  Stable. Managed by cardiology.      11. Pain in both knees, unspecified chronicity  Managed by neurology. Patient having increased right leg pain. Recommended patient try using tylenol and  diclofenac gel more frequently to help with pain.     12. Dizziness  Managed by neurology.        PLAN:                            Make sure you scan your sensor at least every 8 hours   PA submitted for Ozempic, when you receive it, START ozempic 0.25mg weekly   Prescription sent to [facility]   [phone]  INCREASE novolog to 16 units before meals   INCREASE lantus to 42 units daily   Use miralax 17g up to twice daily as needed for hard stools   Use diclofenac gel up to 4 times per day on leg to help with pain   Use tylenol up to every 6 hours to help with leg pain     Follow-up: Return in 10 weeks (on 8/12/2025) for with PharmD.    SUBJECTIVE/OBJECTIVE:                          Kulwant Amin is a 55 year old female seen for a follow-up visit. Patient was accompanied by daugher-in-law.  (ID# [id number]) was used during today's visit.       Reason for visit: MTM follow-up.    Allergies/ADRs: Reviewed in chart  Past Medical History: Reviewed in chart  Tobacco: She reports that she quit smoking about 22 years ago. Her smoking use included cigarettes. She started smoking about 52 years ago. She has a 30 pack-year smoking history. She has never been exposed to tobacco smoke. She has never used smokeless tobacco.  Alcohol: none    Medication Adherence/Access: Patient has a home health nurse that sets up a three times daily pillbox. Patient reports no missed doses in the past week.   Medication vials brought into clinic today.     Diabetes   Metformin  mg 2 tablets twice daily with food  Lantus 40 units once daily at night   Novolog 14 units twice daily 10 minutes before meals   Ozempic 0.25mg weekly - never started, requires PA   Daughter in law helps patient with insulin administration, no missed doses.   Reports no medication side effects.   Current diabetes symptoms: low: shaky - aware of how to correct for lows if needed.   Diet/Exercise: Reports eating 2 meals per day + snack in middle  of day, 11 am and 6:30-7, usually eats brown rice, beans, lentils, veggies. Has decreased portion size and eating better.   Med Hx: metformin IR (nausea/vomiting)      Blood sugar monitoring: Libre2   Reports symptoms of hypoglycemia (sweating) about once per month.         Eye exam is up to date  Foot exam is up to date    Hypertension /CAD/SVT  Metoprolol tartrate 12.5 mg twice daily   Patient reports the following medication side effects: dizziness has improved a little bit recently.   Patient self-monitors blood pressure every few days, but does not remember the last reading.   Cardiology: Dr. Hirsch  - per last MTM visit, cardiology referring to PCP and MTM to manage blood pressure  MedHx: lisinopril (stopped by pulm)      Hyperlipidemia   /Hx pontine stroke  Atorvastatin 80 mg daily   Aspirin 81 mg daily (per neurology)   Patient reports muscle pains bilaterally in her legs, but points to R knee. States that this has been present for a long time and her doctors are already aware of this.      Asthma   /COPD/asthma/hypereosinophilia  Montelukast 10 mg daily at bedtime  Breo Ellipta 200/25mcg 1 puff daily at night  Albuterol HFA every 4 hours daily as needed - using 3 times daily   Albuterol nebs every 6 hours as needed   Duoneb 0.5-2.5mg/3mL neb 4 times daily   Dupixent 300mg every 2 weeks    Confirmed that patient rinses mouth after using inhalers.   Breathing has been the same.   Pulmonologist: [facility], Tamra Mohr.   Allergy & immunology: [facility], Dr. Elizabeth Marie, [phone]   Dispense report shows dupixent was last filled 4/22 for 14 day supply. Patient's daughter-in-law reported that the patient has been getting dupixent every 2 weeks. Last dose 5/20.      Constipation   Miralax 17 g daily   Senna 8.6mg daily PRN- using 1 every day   Reports having bowel movement 1-3 days.   Reports that stools have been harder recently.   Reports she drinks a lot of water.      GERD     Esomeprazole 40 mg daily in the morning  Sucralfate 1g susp 4 times daily - using as needed 2-4 times per day   Tums 1 tablet twice daily PRN   Managed by gastroenterology.   Reports well controlled symptoms with current regimen.  Patient previously taking omeprazole which was switched to esomeprazole per GI specialist.   Per GI: Nortriptyline (discontinued - stopped due to being on amitriptyline)      Headache   Amitriptyline 50 mg daily at bedtime  Emgality 240mg injection x 1 - completed, removed from med list  Emgality 120mg injection every 28 days   Seeing neurologist, Dr. Prescott.   Patient reports she her headaches have improved since resuming emgality. Only having headaches 1-2 times per week which is a great improvement from before when they were every day.       Mental Health   / Depression  Fluoxetine 10 mg daily  No concerns. Reports her mood had been good.   Finds that she is forgetful, but thinks this is improving.      Supplements   Vitamin D 400 international units daily  States that she has been taking this for mouth sores and finds it helpful.     Pericarditis    Colchicine 0.6 mg daily (started 7/24)  Reports improvement of chest pain.   Rx per Dr. Hirsch    Pain/Leg cramps  Acetaminophen 325mg q6h prn - using once per day   Gabapentin 600 mg three times daily  Ferrous sulfate 325 daily - per juan for leg cramps  Diclofenac 1% gel 4g 4 times daily - using 2 times per day  Lidocaine 4% patch daily   Tizanidine 2mg 3 times daily as needed   Reports her right leg feel week and she is having muscle pain that is making more difficult to walk.       Dizziness  Meclizine 25mg 3 times daily PRN - using every day    Rx from Dr. Prescott (neurology)   Reported that it has helped a little bit and that dizziness has improved since discontinuing lisinopril.       Today's Vitals: /62   Pulse 90   Wt 127 lb 3.2 oz (57.7 kg)   SpO2 97%   BMI 24.84 kg/m    ----------------    I spent 40 minutes  with this patient today. All changes were made via collaborative practice agreement with Adrien Cardoza MD.     A summary of these recommendations was sent via [employer].    Andra Hernández, PharmD   Medication Therapy Management Pharmacy Resident    Preceptor cosignature: Patient was seen independently by Dr. Hernández. I have reviewed the assessment and plan. Malu Gil, PharmD, [facility]     Medication Therapy Recommendations  Constipation   1 Current Medication: polyethylene glycol (MIRALAX) 17 GM/Dose powder   Current Medication Sig: Take 17 g (1 Capful) by mouth daily.   Rationale: Dose too low - Dosage too low - Effectiveness   Recommendation: Increase Frequency   Status: Accepted per CPA   Identified Date: 6/3/2025 Completed Date: 6/3/2025         Type 2 diabetes mellitus treated with insulin (H)   1 Current Medication: insulin aspart (NOVOLOG PEN) 100 UNIT/ML pen   Current Medication Sig: Inject 14 Units subcutaneously 2 times daily (before meals).   Rationale: Dose too low - Dosage too low - Effectiveness   Recommendation: Increase Dose   Status: Accepted per CPA   Identified Date: 6/3/2025 Completed Date: 6/3/2025         2 Current Medication: insulin glargine (LANTUS PEN) 100 UNIT/ML pen   Current Medication Sig: Inject 40 Units subcutaneously every morning.   Rationale: Dose too low - Dosage too low - Effectiveness   Recommendation: Increase Dose   Status: Accepted per CPA   Identified Date: 6/3/2025 Completed Date: 6/3/2025         3 Current Medication: insulin glargine (LANTUS PEN) 100 UNIT/ML pen   Current Medication Sig: Inject 40 Units subcutaneously every morning.   Rationale: Synergistic therapy - Needs additional medication therapy - Indication   Recommendation: Start Medication   Status: Accepted per CPA   Identified Date: 6/3/2025 Completed Date: 6/3/2025

## 2025-06-03 ENCOUNTER — OFFICE VISIT (OUTPATIENT)
Dept: PHARMACY | Facility: CLINIC | Age: 55
End: 2025-06-03
Payer: COMMERCIAL

## 2025-06-03 VITALS
OXYGEN SATURATION: 97 % | BODY MASS INDEX: 24.84 KG/M2 | SYSTOLIC BLOOD PRESSURE: 122 MMHG | DIASTOLIC BLOOD PRESSURE: 62 MMHG | WEIGHT: 127.2 LBS | HEART RATE: 90 BPM

## 2025-06-03 DIAGNOSIS — R42 DIZZINESS: ICD-10-CM

## 2025-06-03 DIAGNOSIS — G89.29 CHRONIC NONINTRACTABLE HEADACHE, UNSPECIFIED HEADACHE TYPE: ICD-10-CM

## 2025-06-03 DIAGNOSIS — M25.561 PAIN IN BOTH KNEES, UNSPECIFIED CHRONICITY: ICD-10-CM

## 2025-06-03 DIAGNOSIS — Z79.4 TYPE 2 DIABETES MELLITUS TREATED WITH INSULIN (H): Primary | ICD-10-CM

## 2025-06-03 DIAGNOSIS — I10 ESSENTIAL HYPERTENSION: ICD-10-CM

## 2025-06-03 DIAGNOSIS — F33.9 RECURRENT MAJOR DEPRESSIVE DISORDER, REMISSION STATUS UNSPECIFIED: ICD-10-CM

## 2025-06-03 DIAGNOSIS — R51.9 CHRONIC NONINTRACTABLE HEADACHE, UNSPECIFIED HEADACHE TYPE: ICD-10-CM

## 2025-06-03 DIAGNOSIS — R13.10 DYSPHAGIA, UNSPECIFIED TYPE: ICD-10-CM

## 2025-06-03 DIAGNOSIS — Z78.9 TAKES DIETARY SUPPLEMENTS: ICD-10-CM

## 2025-06-03 DIAGNOSIS — K59.00 CONSTIPATION, UNSPECIFIED CONSTIPATION TYPE: ICD-10-CM

## 2025-06-03 DIAGNOSIS — K21.9 GASTROESOPHAGEAL REFLUX DISEASE WITHOUT ESOPHAGITIS: Chronic | ICD-10-CM

## 2025-06-03 DIAGNOSIS — E78.5 HYPERLIPIDEMIA, UNSPECIFIED HYPERLIPIDEMIA TYPE: ICD-10-CM

## 2025-06-03 DIAGNOSIS — I31.9 PERICARDITIS: ICD-10-CM

## 2025-06-03 DIAGNOSIS — M25.562 PAIN IN BOTH KNEES, UNSPECIFIED CHRONICITY: ICD-10-CM

## 2025-06-03 DIAGNOSIS — J45.50 SEVERE PERSISTENT ASTHMA, UNSPECIFIED WHETHER COMPLICATED (H): ICD-10-CM

## 2025-06-03 DIAGNOSIS — J44.9 CHRONIC OBSTRUCTIVE PULMONARY DISEASE, UNSPECIFIED COPD TYPE (H): ICD-10-CM

## 2025-06-03 DIAGNOSIS — I31.9 PERICARDITIS, UNSPECIFIED CHRONICITY, UNSPECIFIED TYPE: ICD-10-CM

## 2025-06-03 DIAGNOSIS — E11.9 TYPE 2 DIABETES MELLITUS TREATED WITH INSULIN (H): Primary | ICD-10-CM

## 2025-06-03 PROCEDURE — 3074F SYST BP LT 130 MM HG: CPT | Performed by: PHARMACIST

## 2025-06-03 PROCEDURE — 3078F DIAST BP <80 MM HG: CPT | Performed by: PHARMACIST

## 2025-06-03 PROCEDURE — 99607 MTMS BY PHARM ADDL 15 MIN: CPT

## 2025-06-03 PROCEDURE — 99606 MTMS BY PHARM EST 15 MIN: CPT

## 2025-06-03 RX ORDER — POLYETHYLENE GLYCOL 3350 17 G/17G
1 POWDER, FOR SOLUTION ORAL 2 TIMES DAILY PRN
Qty: 510 G | Refills: 11 | Status: SHIPPED | OUTPATIENT
Start: 2025-06-03

## 2025-06-03 RX ORDER — PSEUDOEPHED/ACETAMINOPH/DIPHEN 30MG-500MG
500 TABLET ORAL EVERY 6 HOURS PRN
Qty: 100 TABLET | Refills: 1 | Status: SHIPPED | OUTPATIENT
Start: 2025-06-03

## 2025-06-03 RX ORDER — SEMAGLUTIDE 0.68 MG/ML
0.25 INJECTION, SOLUTION SUBCUTANEOUS WEEKLY
Qty: 2 ML | Refills: 3 | Status: SHIPPED | OUTPATIENT
Start: 2025-06-03

## 2025-06-03 NOTE — TELEPHONE ENCOUNTER
"Writer attempt # 1 to call patient with the help of a French  ID # [id number]  regarding Refill RN's message below. Refill RN's message reviewed with Billy Amin (CTC on file).    Per Billy, \"Patient is taking Tylenol 325 mg tablet. \"    Routing message to Dr. Cardoza to review and advise.    Sonya Braxton, BSN, RN, PHN   [facility]    "

## 2025-06-03 NOTE — PATIENT INSTRUCTIONS
Patient:   PHILIP ELLIS            MRN: [id number]            FIN: [id number]              Age:   63 years     Sex:  MALE     :  52   Associated Diagnoses:   None   Author:   KANDACE, NORM     PULMONARY HYPERTENSION CLINIC [facility]  Referring Physician/PCP:   CHIEF CONCERN: repeat RHC, pt being evaluated for lung transplant  HISTORY OF PRESENT ILLNESS: Philip Ellis is a 63yoM with pmhx of pulmonary sarcoidoisis, mild PH, and HFpEF, HTN who presents for elective RHC following optimization of BP control and diuresis. He was initially cathed 4 months ago and his pulmonary pressures nearly normalized with treatment of systemic hypertension. This deemed him WHO Group 2 pulmonary hypertension and was therefore not started on pulmonary vasodilator therapy.  Currently, he denies chest pains, shortness of breath, lower extremity swelling. Says overall he has been feeling well.  Today's weight 100.9kg, up 2.5kg since last visit.    PAST MEDICAL HISTORY:   Asthma  Chronic hypoxemic respiratory failure  Cough  Elevated cholesterol/high density lipoprotein ratio  Emphysema/COPD  Hypertension  Hypothyroid  Hypothyroidism  On home oxygen therapy  Pulmonary hypertension  Pulmonary nodule, right  Retinal detachment  Risk factors for obstructive sleep apnea  Sarcoidosis, lung  Wears glasses  Past Surgical History:  No qualifying data available.    Social History:    Alcohol  Details: Alcohol Abuse in Household: No.  Use: Current.  If current Alcohol user: More than 5 (M) or 4 (F) drinks within a couple of hours? No.  Details: Use: Current.  Frequency: 1-2 times per year.  Exercise  Details: Exercise: Regularly.; Comment(s): pulmonary rehab 2 days per week  Details: Excercise: Occasionally.  Duration (mins): 20.  Times Per Week: 1-2 times/week.  Type: Walking.  Home/Environment  Details: Alcohol Abuse in Household: No.  Substance Abuse in Household: No.  Smoker in Household: No.   "Recommendations from today's MTM visit:                                                      Make sure you scan your sensor at least every 8 hours   I will work on getting permission from your insurance to start ozempic, when you receive START ozempic 0.25mg weekly   Prescription sent to [facility]   [phone]  INCREASE novolog to 16 units before meals   INCREASE lantus to 42 units daily   Use miralax 17g up to twice daily as needed for hard stools   Use diclofenac gel up to 4 times per day on leg to help with pain   Use tylenol up to every 6 hours to help with leg pain     Follow-up: Return in 10 weeks (on 8/12/2025) for with PharmD.      It was great speaking with you today.  I value your experience and would be very thankful for your time in providing feedback in our clinic survey. In the next few days, you may receive an email or text message from [employer] with a link to a survey related to your  clinical pharmacist.\"      To schedule another MTM appointment, please call the clinic directly or you may call the MTM scheduling line at [phone].      My Clinical Pharmacist's contact information:                                                       Please feel free to contact me with any questions or concerns you have.      Andra Hernández, PharmD   Medication Therapy Management Pharmacy Resident      " Patient Lives With: Spouse.  Living Situation: Lives With Spouse.  Ambulation: Independent.  Bathing: Independent.  Dressing: Independent.  Driving: Independent.  Eating: Independent.  Elimination: Independent.  Grooming: Independent.  Preparing Meal: Independent.  Taking Meds: Independent.  Toileting: Independent.  Transfers: Independent.   Current Home Treatments: Nebulizer Treatments.  Assistive Devices Home: Glasses.  Sexual  Details: Sexual orientation: Straight or heterosexual.  Gender Identity: Identifies as male.  Gender on Ins: Male.  Preferred Pronouns: Male.  Substance Abuse  Details: Substance Abuse in Household: No.  Use: None.  Details: Use: None.  Tobacco  Details: Smoker in Houshold: No.  Smoked/Smokeless Tobacco Last 30 Days: No.  Smoking Tobacco Use: Never smoker.  Smokeless Tobacco Use Never.  Details: Smoked/Smokeless Tobacco Last 30 Days: No.  Use: Never smoker.  Cultural/Gnosticist Practices  Details: Gnosticist or Cultural Practices: Pentecostalism.  Gnosticist or Cultural Practices While in Hospital: Yes.  Details: Gnosticist or Cultural Practices: none.  Gnosticist or Cultural Practices While in Hospital: No.      Family History:   MOTHER: CAD (coronary artery disease)                                                                                                                                                                                                          Hypertension; Myocardial infarction  FATHER: CA - Cancer  GRANDMOTHER: Diabetes mellitus type 2  GRANDFATHER: Esophageal cancer  SISTER: COPD (chronic obstructive pulmonary disease)                                                                                                                                                                                                REVIEW OF SYSTEMS:  Constitutional:  Denies headache, fatigue, weight loss, weight gain, fevers, chills.  HEENT:  Denies vision changes, or dry eye. Denies hearing loss.     Respiratory:  Exertional dyspnea. Denies shortness of breath at rest, denies cough, denies wheezing.  Cardiovascular:  Denies chest pain, palpitations, dizziness, and syncope. Denies circulation deficits.  Gastrointestinal:  Denies nausea, vomiting, diarrhea, hematochezia. Denies appetite changes, anorexia or early satiety. Denies dysphagia.  Genitourinary:  Denies dysuria or hematuria.  Musculoskeletal:  Denies pain, stiffness, and LROM. Denies joint pain, swelling.  Neurologic:  Denies weakness, ataxia, headache, seizure, paresthesias.   Integumentary:  Denies rash, lesions.    VITAL SIGNS:     Vitals between:   03-SEP-2019 13:18:11   TO   04-SEP-2019 13:18:11                   LAST RESULT MINIMUM MAXIMUM  Heart Rate 95 95 105  Respiratory Rate 15 15 24  NISBP           144 143 144  NIDBP           88 88 100  NIMBP           104 104 115  SpO2                    96 95 96      PHYSICAL EXAM:  General:  Alert and oriented. In no apparent distress.  Eye:  Pupils are equal, round and reactive. Vision grossly normal.   HENT:  Normocephalic. Normal conjunctiva, non-icteric sclera. Hearing intact.  Neck:  No carotid bruit, No jugular venous distention. No lymphadenopathy.  Respiratory:  Lungs are clear to auscultation, no crackles.  Cardiovascular:  Normal rate, Regular rhythm. 3+ bilateral radial pulses, 2+ bilateral dorsalis pedis. No lower extremity edema bilaterally.  Gastrointestinal:  Soft, Non-tender.    Genitourinary:  No dysuria.  Musculoskeletal   Normal range of motion. Normal strength. Normal gait.    Integumentary:  Warm, Pink, Intact. No rash, no cyanosis, no clubbing of fingernails.  Neurologic:  Alert, Oriented.    Psychiatric:  Appropriate mood & affect.       Recent Labs:  No Qualifying Labs are resulted on this patient in the last 24 hours    Home Medications (19) Active  albuterol inhalation 0.083% 2.5 mg/3 mL solution (Proventil) 2.5 mg = 3 mL, Inhaled, Q6H  amLODIPine oral 10 mg tablet 10 mg = 1  tab, Oral, Daily  aspirin oral 81 mg chewable tablet 81 mg = 1 tab, Oral, Daily  folic acid oral 1 mg tablet 1 tab, Oral, Daily  Hair, Skin & Nails 5 mg oral capsule 5 mg = 1 cap, Oral, Daily  levothyroxine 175 mcg (0.175 mg) oral tablet 175 mcg = 1 tab, Oral, Daily  losartan oral 25 mg tablet 25 mg = 1 tab, Oral, Daily  montelukast oral 10 mg tablet 10 mg = 1 tab, Oral, Q Evening  One Daily Multi-Essential oral tablet 1 tab, Oral, Daily  potassium CHLORIDE oral 20 mEq ER tablet (KCl / K-Dur) 20 mEq = 1 tab, Oral, TID  predniSONE oral 5 mg tablet 10 mg, Oral, Daily  Prolensa 0.07% ophthalmic solution 1 drop, Right Eye, Q Bedtime  Simply Saline nasal spray 1 spray, IntraNasal, As Directed PRN  Symbicort inhaler oral 160-4.5 mcg/puff 2 puff, Inhaled, BID  torsemide oral 20 mg tablet (Demadex) 40 mg = 2 tab, Oral, BID  venlafaxine extended release 150 mg, Oral, Daily  Ventolin HFA 90 mcg/inh inhalation aerosol 180 mcg = 2 puff, Inhaled, QID  Vitamin B12 1,000 mcg, Oral, Daily  Vytorin 10 mg-20 mg oral tablet 1 tab, Oral, Q Evening      PULMONARY HYPERTENSION DIAGNOSTIC WORK UP:  PULMONARY HYPERTENSION DIAGNOSTIC WORKUP AND RELEVANT HISTORY  Philip Campos 52  ECHO:   [facility] 19 RA 17 PA 44/26/31 PAWP 33 LVEDP 26 Ao 145/81/107 TPG 5 PA sat 71% Ao 97% Dwain CO 5.3/2.4 PVR <1wu   Condition 1: Nipride 0.25mcg/kg/min PA 53/24/39 LVEDP 25    Condition 2: Nipride 0.75 mcg/kg/min PA 35/16/24 LVEDP 16   Angiographically normal coronaries.  EK19  V/Q:   6MWT:   PFTS: 4/10/191. Spirometry showing FVC to be reduced at 2.65, 57% predicted.  FEV1 is   reduced at 1.03, 29% predicted.  FEV1 ratio is only 39.  FEF 25-75 14% predicted. 2. Airway resistance is significantly increased at 172%. 3. Lung volumes, total lung capacity is normal at 105%; however, residual  volume is 198% predicted. 4. DLCO is reduced at 56%.  Flow-volume loop shows very severe obstructive  pattern. IMPRESSION:  Current testing indicates very  severe obstructive ventilatory defect with significant air trapping and reduction in DLCO compatible with underlying diagnosis of chronic obstructive pulmonary disease and sarcoidosis.   SLEEP:  CT CHEST wo: 3/24/19 1.  New interstitial nodular infiltrative changes versus interstitial  infiltrate with areas of consolidation right lower lobe.  Differential includes an acute pneumonia versus sarcoidosis pulmonary manifestations.  Serial follow-up recommended.  Clinical collaboration recommended.  Pulmonary consultation recommended.2.  Interval new irregular nodular opacity right upper lobe.  Differential includes sarcoidosis manifestation versus irregular pulmonary nodule.  Serial follow-up recommended.  Clinical collaboration  recommended.  Pulmonary consultation recommended.3.  Unchanged calcific hilar and infrahilar lymphadenopathy.  Unchanged calcified mediastinal lymph nodes.  Findings are compatible with known sarcoidosis.  Hilar lymphadenopathy attenuates the central bronchi.4.  Mural calcifications (porcelain gallbladder) versus gallstones gallbladder neck.  Consider sonographic correlation.5.  Advanced emphysematous changes right lung.  Centrilobular  emphysematous changes left lung.6.  Resolved nodule left lower lobe.  CMRI: 7/15/19 1.   The left ventricle is normal in size with normal systolic  function. LVEF = 66   %. 2. The right ventricle is normal in size with normal systolic function. RVEF = 45   %. 3.   Normal rest perfusion. 4.  No myocardial infarction or fibrosis. 5.   Dilated pulmonary artery.  CTHR:   CTPE:   PULM ANGIO:  CMRI:   CPX:  JONES DPLX3/29/19: Normal Doppler venous ultrasound, bilateral lower extremities  DRUG/TOXIN USE: Y[_]/N[x] ______________  PULMONARY REHAB: Completed Y[_]/N[_] Date: ____________    WHO GROUP 2    PCP & Consulting MDs:  Dry Weight:      IMPRESSION/PLAN: 62yo m with pulmonary sarcoid and HFpEF, HTN being worked up for lung transplant at [facility].     Pulmonary HTN,  mild  - NYHA Class II - euvolemic on exam  - WHO Group 2  - mPA 31à24 with nipride on last cath  - CMRI(7/15/2019): Shows normal LV/RV size and function; no evidence of LGE to suggest sarcoid.  - likely related to pulmonary sarcoid (Group V) vs. HTN/Chronic diastolic HF (Group 2)  - Needs sleep study  - PFTs show severe obstructive ventilatory defect - follows with Dr. Shah - possible group 3 component.    HTN  - currently controlled on amlodipine and losartan + torsemide    Chronic diastolic Hf  - high PCW/LVED pressure on initial RHC   - pressures improved to near normal with administration of nipride  - euvolemic on exam  - plan for LHC/RHC, check pressures only      Plan:  Start pulmonary rehab—pt has not yet started  Undergoing lung [facility] eval [facility] (pressures) - RHC +/- radial pigtail for pressure  RTC in 3 months    Discussed procedure with patient including description, technique, and associated risks. Questions answered. Pt agreeable to proceed.  Thank you for allowing me to participate in the care of this patient.  Please call me with any questions or concerns.    Albert Fernandez MD [facility]  Advanced HF and Transplant Cardiology  [facility]  In-house Pager:   Outside Pager: [phone]  [facility] Answering Service:  [phone]

## 2025-06-03 NOTE — Clinical Note
Idalia Cardoza,   I had an appointment with Kulwant today, she has been doing much better recently! Blood sugars still elevated so I increased her lantus and novolog and am represcribing ozempic (now requires a PA which is why she never filled it). Also will have her try miralax twice daily as she complained of hard stools. Let me know if you have any questions!   Thanks,  Andra

## 2025-06-24 ENCOUNTER — TELEPHONE (OUTPATIENT)
Dept: FAMILY MEDICINE | Facility: CLINIC | Age: 55
End: 2025-06-24
Payer: COMMERCIAL

## 2025-06-24 NOTE — TELEPHONE ENCOUNTER
Forms/Letter Request    Type of form/letter: [facility] Plan certification date: 05/10/25 to 07/08/25    Do we have the form/letter: Yes: PCP blue Folder    Who is the form from? Home care    Where did/will the form come from? form was faxed in    When is form/letter needed by: when completed    How would you like the form/letter returned: Fax : [phone]    Patient Notified form requests are processed in 5-7 business days:N/A

## 2025-06-26 ENCOUNTER — TELEPHONE (OUTPATIENT)
Dept: PULMONOLOGY | Facility: CLINIC | Age: 55
End: 2025-06-26
Payer: COMMERCIAL

## 2025-06-26 DIAGNOSIS — J45.51 SEVERE PERSISTENT ASTHMA WITH EXACERBATION (H): ICD-10-CM

## 2025-06-26 RX ORDER — PREDNISONE 10 MG/1
TABLET ORAL
Qty: 30 TABLET | Refills: 0 | Status: SHIPPED | OUTPATIENT
Start: 2025-06-26

## 2025-06-26 NOTE — TELEPHONE ENCOUNTER
Phone call from patient's daughter-in-law, Billy.  States that patient completed prednisone taper about 2 weeks ago.  Was good for about a week, and now with wheezing and cough again.     Reviewed with Dr. Duong.  Will send prednisone taper.       Unable to reach Billy back by phone.   Left detailed VM message that new prescription has been sent to [facility].  Left my phone number to call back if further questions.

## 2025-07-07 ENCOUNTER — OFFICE VISIT (OUTPATIENT)
Dept: FAMILY MEDICINE | Facility: CLINIC | Age: 55
End: 2025-07-07
Payer: COMMERCIAL

## 2025-07-07 VITALS
TEMPERATURE: 98.2 F | DIASTOLIC BLOOD PRESSURE: 86 MMHG | HEIGHT: 60 IN | BODY MASS INDEX: 24.41 KG/M2 | HEART RATE: 99 BPM | OXYGEN SATURATION: 99 % | SYSTOLIC BLOOD PRESSURE: 131 MMHG | RESPIRATION RATE: 20 BRPM | WEIGHT: 124.31 LBS

## 2025-07-07 DIAGNOSIS — E11.9 TYPE 2 DIABETES MELLITUS TREATED WITH INSULIN (H): Primary | ICD-10-CM

## 2025-07-07 DIAGNOSIS — G89.29 CHRONIC LOW BACK PAIN WITHOUT SCIATICA, UNSPECIFIED BACK PAIN LATERALITY: ICD-10-CM

## 2025-07-07 DIAGNOSIS — G89.29 CHRONIC NONINTRACTABLE HEADACHE, UNSPECIFIED HEADACHE TYPE: Chronic | ICD-10-CM

## 2025-07-07 DIAGNOSIS — I10 ESSENTIAL HYPERTENSION: ICD-10-CM

## 2025-07-07 DIAGNOSIS — F41.9 ANXIETY: Chronic | ICD-10-CM

## 2025-07-07 DIAGNOSIS — R51.9 CHRONIC NONINTRACTABLE HEADACHE, UNSPECIFIED HEADACHE TYPE: Chronic | ICD-10-CM

## 2025-07-07 DIAGNOSIS — Z79.4 TYPE 2 DIABETES MELLITUS TREATED WITH INSULIN (H): Primary | ICD-10-CM

## 2025-07-07 DIAGNOSIS — M54.50 CHRONIC LOW BACK PAIN WITHOUT SCIATICA, UNSPECIFIED BACK PAIN LATERALITY: ICD-10-CM

## 2025-07-07 DIAGNOSIS — J44.9 CHRONIC OBSTRUCTIVE PULMONARY DISEASE, UNSPECIFIED COPD TYPE (H): Chronic | ICD-10-CM

## 2025-07-07 PROCEDURE — 90471 IMMUNIZATION ADMIN: CPT | Performed by: FAMILY MEDICINE

## 2025-07-07 PROCEDURE — 90750 HZV VACC RECOMBINANT IM: CPT | Performed by: FAMILY MEDICINE

## 2025-07-07 PROCEDURE — 99214 OFFICE O/P EST MOD 30 MIN: CPT | Mod: 25 | Performed by: FAMILY MEDICINE

## 2025-07-07 PROCEDURE — 3075F SYST BP GE 130 - 139MM HG: CPT | Performed by: FAMILY MEDICINE

## 2025-07-07 PROCEDURE — 96127 BRIEF EMOTIONAL/BEHAV ASSMT: CPT | Performed by: FAMILY MEDICINE

## 2025-07-07 PROCEDURE — 3079F DIAST BP 80-89 MM HG: CPT | Performed by: FAMILY MEDICINE

## 2025-07-07 NOTE — PROGRESS NOTES
Type 2 diabetes mellitus treated with insulin (H)  Did not tolerate Ozempic, stopped after 2 doses.  Increase Lantus to 44 units and continue other medications.  - insulin glargine (LANTUS PEN) 100 UNIT/ML pen; Inject 44 Units subcutaneously every morning.    Anxiety  Stable    Essential hypertension  Controlled.    Chronic low back pain without sciatica, unspecified back pain laterality  No worsening pain.    Chronic obstructive pulmonary disease, unspecified COPD type (H)  Managed by pulmonology    Chronic nonintractable headache, unspecified headache type  Managed by neurology.    Doug Blum is a 55 year old female with multiple chronic medical conditions seeing multiple specialist here for follow-up.  She was started on Ozempic by MTM team about a month ago.  She used 2 doses but stopped due to reported nausea, itchiness on the skin and palpitations after using it.  She also uses Lantus to 42 units and mealtime insulin 16 units before meals.  She is also on metformin  mg twice a day.  Random blood sugar in the 200s and fasting blood sugar 90s to 140s.  Taking Prozac 20 mg daily for anxiety.  No worsening anxiety.  No suicidal homicidal ideations.          7/7/2025    10:03 AM   Additional Questions   Roomed by Genesis PAEZ   Accompanied by daughter in law and grandson     History of Present Illness       Reason for visit:  Recheck medication           Review of Systems  CONSTITUTIONAL: NEGATIVE for fever, chills, change in weight  ENT/MOUTH: NEGATIVE for ear, mouth and throat problems  CV: NEGATIVE for chest pain/chest pressure      Objective    Vitals:    07/07/25 1007   BP: 131/86   Pulse: 99   Resp: 20   Temp: 98.2  F (36.8  C)   TempSrc: Oral   SpO2: 99%   Weight: 56.4 kg (124 lb 5 oz)   Height: 1.524 m (5')      Physical Exam   GENERAL: alert and no distress  NECK: Supple  RESP: Mild wheezing but no crackles or rhonchi.  CV: regular rates and rhythm and no murmur, click or rub  ABDOMEN: soft,  nontender  PSYCH: mentation appears normal  Diabetic foot exam: no trophic changes or ulcerative lesions        Prior to immunization administration, verified patients identity using patient s name and date of birth. Please see Immunization Activity for additional information.     Screening Questionnaire for Adult Immunization    Are you sick today?   No   Do you have allergies to medications, food, a vaccine component or latex?   No   Have you ever had a serious reaction after receiving a vaccination?   No   Do you have a long-term health problem with heart, lung, kidney, or metabolic disease (e.g., diabetes), asthma, a blood disorder, no spleen, complement component deficiency, a cochlear implant, or a spinal fluid leak?  Are you on long-term aspirin therapy?   No   Do you have cancer, leukemia, HIV/AIDS, or any other immune system problem?   No   Do you have a parent, brother, or sister with an immune system problem?   No   In the past 3 months, have you taken medications that affect  your immune system, such as prednisone, other steroids, or anticancer drugs; drugs for the treatment of rheumatoid arthritis, Crohn s disease, or psoriasis; or have you had radiation treatments?   No   Have you had a seizure, or a brain or other nervous system problem?   No   During the past year, have you received a transfusion of blood or blood    products, or been given immune (gamma) globulin or antiviral drug?   No   For women: Are you pregnant or is there a chance you could become       pregnant during the next month?   Yes   Have you received any vaccinations in the past 4 weeks?   No     Immunization questionnaire was positive for at least one answer.  Notified Dr. Cardoza.      Patient instructed to remain in clinic for 15 minutes afterwards, and to report any adverse reactions.     Screening performed by Cruz Sauceda MA on 7/7/2025 at 10:46 AM.       Signed Electronically by: Adrien Cardoza MD

## 2025-07-08 ENCOUNTER — APPOINTMENT (OUTPATIENT)
Dept: CT IMAGING | Facility: HOSPITAL | Age: 55
End: 2025-07-08
Attending: EMERGENCY MEDICINE
Payer: COMMERCIAL

## 2025-07-08 ENCOUNTER — HOSPITAL ENCOUNTER (EMERGENCY)
Facility: HOSPITAL | Age: 55
Discharge: HOME | End: 2025-07-08
Attending: EMERGENCY MEDICINE | Admitting: EMERGENCY MEDICINE
Payer: COMMERCIAL

## 2025-07-08 ENCOUNTER — APPOINTMENT (OUTPATIENT)
Dept: RADIOLOGY | Facility: HOSPITAL | Age: 55
End: 2025-07-08
Attending: EMERGENCY MEDICINE
Payer: COMMERCIAL

## 2025-07-08 VITALS
SYSTOLIC BLOOD PRESSURE: 110 MMHG | DIASTOLIC BLOOD PRESSURE: 72 MMHG | BODY MASS INDEX: 24.54 KG/M2 | WEIGHT: 125 LBS | TEMPERATURE: 98.4 F | HEIGHT: 60 IN | HEART RATE: 96 BPM | RESPIRATION RATE: 24 BRPM | OXYGEN SATURATION: 97 %

## 2025-07-08 DIAGNOSIS — R07.9 CHEST PAIN, UNSPECIFIED TYPE: ICD-10-CM

## 2025-07-08 LAB
ALBUMIN SERPL BCG-MCNC: 3.9 G/DL (ref 3.5–5.2)
ALP SERPL-CCNC: 104 U/L (ref 40–150)
ALT SERPL W P-5'-P-CCNC: 50 U/L (ref 0–50)
ANION GAP SERPL CALCULATED.3IONS-SCNC: 14 MMOL/L (ref 7–15)
AST SERPL W P-5'-P-CCNC: 34 U/L (ref 0–45)
BILIRUB DIRECT SERPL-MCNC: 0.17 MG/DL (ref 0–0.3)
BILIRUB SERPL-MCNC: 0.4 MG/DL
BUN SERPL-MCNC: 14.7 MG/DL (ref 6–20)
CALCIUM SERPL-MCNC: 9.2 MG/DL (ref 8.8–10.4)
CHLORIDE SERPL-SCNC: 102 MMOL/L (ref 98–107)
CREAT SERPL-MCNC: 0.53 MG/DL (ref 0.51–0.95)
EGFRCR SERPLBLD CKD-EPI 2021: >90 ML/MIN/1.73M2
ERYTHROCYTE [DISTWIDTH] IN BLOOD BY AUTOMATED COUNT: 14.1 % (ref 10–15)
GLUCOSE SERPL-MCNC: 243 MG/DL (ref 70–99)
HCO3 SERPL-SCNC: 20 MMOL/L (ref 22–29)
HCT VFR BLD AUTO: 38.5 % (ref 35–47)
HGB BLD-MCNC: 12.3 G/DL (ref 11.7–15.7)
HOLD SPECIMEN: NORMAL
LIPASE SERPL-CCNC: 42 U/L (ref 13–60)
MCH RBC QN AUTO: 26.7 PG (ref 26.5–33)
MCHC RBC AUTO-ENTMCNC: 31.9 G/DL (ref 31.5–36.5)
MCV RBC AUTO: 84 FL (ref 78–100)
NT-PROBNP SERPL-MCNC: <36 PG/ML (ref 0–226)
PLATELET # BLD AUTO: 210 10E3/UL (ref 150–450)
POTASSIUM SERPL-SCNC: 4 MMOL/L (ref 3.4–5.3)
PROT SERPL-MCNC: 6.7 G/DL (ref 6.4–8.3)
RBC # BLD AUTO: 4.6 10E6/UL (ref 3.8–5.2)
SODIUM SERPL-SCNC: 136 MMOL/L (ref 135–145)
TROPONIN T SERPL HS-MCNC: <6 NG/L
TROPONIN T SERPL HS-MCNC: <6 NG/L
WBC # BLD AUTO: 11.2 10E3/UL (ref 4–11)

## 2025-07-08 PROCEDURE — 96374 THER/PROPH/DIAG INJ IV PUSH: CPT | Mod: 59

## 2025-07-08 PROCEDURE — 84484 ASSAY OF TROPONIN QUANT: CPT | Performed by: EMERGENCY MEDICINE

## 2025-07-08 PROCEDURE — 36415 COLL VENOUS BLD VENIPUNCTURE: CPT | Performed by: EMERGENCY MEDICINE

## 2025-07-08 PROCEDURE — 99285 EMERGENCY DEPT VISIT HI MDM: CPT | Mod: 25 | Performed by: EMERGENCY MEDICINE

## 2025-07-08 PROCEDURE — 71046 X-RAY EXAM CHEST 2 VIEWS: CPT

## 2025-07-08 PROCEDURE — 85027 COMPLETE CBC AUTOMATED: CPT | Performed by: EMERGENCY MEDICINE

## 2025-07-08 PROCEDURE — 250N000011 HC RX IP 250 OP 636: Performed by: EMERGENCY MEDICINE

## 2025-07-08 PROCEDURE — 80048 BASIC METABOLIC PNL TOTAL CA: CPT | Performed by: EMERGENCY MEDICINE

## 2025-07-08 PROCEDURE — 83880 ASSAY OF NATRIURETIC PEPTIDE: CPT | Performed by: EMERGENCY MEDICINE

## 2025-07-08 PROCEDURE — 82248 BILIRUBIN DIRECT: CPT | Performed by: EMERGENCY MEDICINE

## 2025-07-08 PROCEDURE — 36415 COLL VENOUS BLD VENIPUNCTURE: CPT | Performed by: STUDENT IN AN ORGANIZED HEALTH CARE EDUCATION/TRAINING PROGRAM

## 2025-07-08 PROCEDURE — 83690 ASSAY OF LIPASE: CPT | Performed by: EMERGENCY MEDICINE

## 2025-07-08 PROCEDURE — 93005 ELECTROCARDIOGRAM TRACING: CPT | Performed by: EMERGENCY MEDICINE

## 2025-07-08 PROCEDURE — 71275 CT ANGIOGRAPHY CHEST: CPT

## 2025-07-08 RX ORDER — MORPHINE SULFATE 4 MG/ML
4 INJECTION, SOLUTION INTRAMUSCULAR; INTRAVENOUS ONCE
Refills: 0 | Status: COMPLETED | OUTPATIENT
Start: 2025-07-08 | End: 2025-07-08

## 2025-07-08 RX ORDER — IOPAMIDOL 755 MG/ML
64 INJECTION, SOLUTION INTRAVASCULAR ONCE
Status: COMPLETED | OUTPATIENT
Start: 2025-07-08 | End: 2025-07-08

## 2025-07-08 RX ADMIN — MORPHINE SULFATE 4 MG: 4 INJECTION, SOLUTION INTRAMUSCULAR; INTRAVENOUS at 16:42

## 2025-07-08 RX ADMIN — IOPAMIDOL 64 ML: 755 INJECTION, SOLUTION INTRAVENOUS at 21:23

## 2025-07-08 ASSESSMENT — ACTIVITIES OF DAILY LIVING (ADL)
ADLS_ACUITY_SCORE: 59

## 2025-07-08 ASSESSMENT — COLUMBIA-SUICIDE SEVERITY RATING SCALE - C-SSRS
1. IN THE PAST MONTH, HAVE YOU WISHED YOU WERE DEAD OR WISHED YOU COULD GO TO SLEEP AND NOT WAKE UP?: NO
2. HAVE YOU ACTUALLY HAD ANY THOUGHTS OF KILLING YOURSELF IN THE PAST MONTH?: NO
6. HAVE YOU EVER DONE ANYTHING, STARTED TO DO ANYTHING, OR PREPARED TO DO ANYTHING TO END YOUR LIFE?: NO

## 2025-07-08 NOTE — ED PROVIDER NOTES
EMERGENCY DEPARTMENT ENCOUnter      NAME: Kulwant Amin  AGE: 55 year old female  YOB: 1970  MRN: [id number]  EVALUATION DATE & TIME: No admission date for patient encounter.    PCP: Adrien Cardoza    ED PROVIDER: Sunny Jeffries DO      Chief Complaint   Patient presents with    Chest Pain    Palpitations    Shortness of Breath         FINAL IMPRESSION:  1. Chest pain, unspecified type          ED COURSE & MEDICAL DECISION MAKING:    3:30 PM I introduced myself to the patient, obtained patient history, performed a physical exam, and discussed plan for ED workup including potential diagnostic laboratory/imaging studies and interventions.  3:58 PM Spoke with Dr. Velasquez, cardiology. Discussed patient's presentation and EKG.  10:29 PM Rechecked and updated the patient. We discussed the plan for discharge and the patient is agreeable. Reviewed supportive cares, symptomatic treatment, outpatient follow up, and reasons to return to the Emergency Department. Patient to be discharged by ED RN.       The patient presented to the emergency department today with complaints of chest pain.  She has a history of cardiac disease.  Multiple EKGs were performed in the emergency department without any evidence of acute ischemic changes.  I discussed her case with cardiology.  They reviewed the EKGs as well and agreed that there was no evidence of ischemia.  Laboratory testing is normal as well.  A chest CT was performed for further evaluation but this also shows no acute abnormality.  Given the patient's normal workup today, I felt that she can be safely discharged home.  She and family are comfortable with this plan.  They have been advised to return right away for any worsening symptoms or other concerns.      Medical Decision Making  I obtained history from Family Member/Significant Other  Discharge. No recommendations on prescription strength medication(s). I considered admission, but discharged patient after significant  PATIENTS WIFE CALLED TO REPORT PATIENT BLOOD PRESSURE 116/61      GOOD CALL BACK [phone]   clinical improvement.    MIPS (CTPE, Dental pain, Collins, Sinusitis, Asthma/COPD, Head Trauma): CT Pulmonary Angiogram:Patient is moderate to high risk for PE.    SEPSIS: None        At the conclusion of the encounter I discussed the results of all of the tests and the disposition. The questions were answered. The patient or family acknowledged understanding and was agreeable with the care plan.         MEDICATIONS GIVEN IN THE EMERGENCY:  Medications   morphine (PF) injection 4 mg (4 mg Intravenous $Given 7/8/25 6412)   iopamidol (ISOVUE-370) solution 64 mL (64 mLs Intravenous $Given 7/8/25 2123)     =================================================================    HPI        Kulwant Amin is a 55 year old female with a pertinent history of COPD, CAD with coronary angiogram, GERD, ischemic stroke, HTN, and DM2 who presents to this ED by private car with family for evaluation of chest pain.    Per the patient's daughter, around 1500 this afternoon the patient had onset of chest pain. She has had similar pain in the past with prior heart attacks. She had a similar episode of chest pain 2 days ago but at that time the pain resolved after about an hour. The patient currently takes aspirin daily, no other blood thinning medications.      PAST MEDICAL HISTORY:  Past Medical History:   Diagnosis Date    Acute asthma exacerbation 01/06/2020    Anxiety     Arthritis     Asthma exacerbation 11/19/2015    Chronic obstructive pulmonary disease, unspecified COPD type (H)     COPD (chronic obstructive pulmonary disease) (H)     Coronary artery disease due to lipid rich plaque     Depression     Diabetes (H)     Epigastric pain 12/15/2021    Essential hypertension     GERD (gastroesophageal reflux disease)     Infection due to 2019 novel coronavirus 01/03/2022    positive with COVID-19 on January 3, 2022    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    Microcytic anemia 11/17/2019    Motion sickness     PONV (postoperative nausea and  vomiting)     S/P coronary artery stent placement 02/02/2018    Sleep apnea     TB lung, latent     9 mos INH       PAST SURGICAL HISTORY:  Past Surgical History:   Procedure Laterality Date    CORONARY STENT PLACEMENT  2018    CV CORONARY ANGIOGRAM N/A 1/25/2018    Procedure: Coronary Angiogram;  Surgeon: Angelo Serrano MD;  Location: [facility] Cath Lab;  Service:     CV CORONARY ANGIOGRAM N/A 5/5/2022    Procedure: Coronary Angiogram;  Surgeon: Irwin Cano MD;  Location: [facility] CATH LAB CV    CV CORONARY ANGIOGRAM  5/5/2022    Procedure: ;  Surgeon: Irwin Cano MD;  Location: [facility] CV    ESOPHAGOSCOPY, GASTROSCOPY, DUODENOSCOPY (EGD), COMBINED N/A 11/21/2024    Procedure: ESOPHAGOGASTRODUODENOSCOPY with BIOPSY;  Surgeon: Gomez Bella MD;  Location: [facility] OR    SC ESOPHAGOGASTRODUODENOSCOPY TRANSORAL DIAGNOSTIC N/A 12/10/2018    Procedure: ESOPHAGOGASTRODUODENOSCOPY (EGD);  Surgeon: Eddie Renteria MD;  Location: [facility];  Service: Gastroenterology    SC ESOPHAGOGASTRODUODENOSCOPY TRANSORAL DIAGNOSTIC N/A 12/3/2020    Procedure: ESOPHAGOGASTRODUODENOSCOPY (EGD) with biospies ;  Surgeon: Avi Crow MD;  Location: [facility];  Service: Gastroenterology    [facility] COLONOSCOPY W/WO BRUSH/WASH N/A 12/10/2018    Procedure: COLONOSCOPY with polypectomy using biopsy forceps;  Surgeon: Eddie Renteria MD;  Location: [facility] GI;  Service: Gastroenterology           CURRENT MEDICATIONS:    acetaminophen (TYLENOL) 500 MG tablet  albuterol (PROAIR HFA/PROVENTIL HFA/VENTOLIN HFA) 108 (90 Base) MCG/ACT inhaler  amitriptyline (ELAVIL) 50 MG tablet  aspirin 81 MG EC tablet  atorvastatin (LIPITOR) 80 MG tablet  BD EZ U/F 32G X 4 MM insulin pen needle  budesonide-formoterol (SYMBICORT/BREYNA) 160-4.5 MCG/ACT Inhaler  calcium carbonate (TUMS) 500 MG chewable tablet  colchicine (COLCRYS) 0.6 MG tablet  Continuous Glucose  (FREESTYLE MONICA 14 DAY READER)  MICHAEL  Continuous Glucose Sensor (FREESTYLE MONICA 2 SENSOR) MIS  CONTOUR NEXT TEST test strip  dextromethorphan (DELSYM) 30 MG/5ML liquid  diclofenac (VOLTAREN) 1 % topical gel  dupilumab (DUPIXENT) 300 MG/2ML prefilled pen  ferrous sulfate (FEROSUL) 325 (65 Fe) MG tablet  FLUoxetine (PROZAC) 20 MG capsule  gabapentin (NEURONTIN) 600 MG tablet  galcanezumab-gnlm (EMGALITY) 120 MG/ML injection  Global Inject Ease Lancets 30G MISC  insulin aspart (NOVOLOG PEN) 100 UNIT/ML pen  insulin glargine (LANTUS PEN) 100 UNIT/ML pen  ipratropium - albuterol 0.5 mg/2.5 mg/3 mL (DUONEB) 0.5-2.5 (3) MG/3ML neb solution  Lidocaine (LIDOCARE) 4 % Patch  meclizine (ANTIVERT) 25 MG tablet  metFORMIN (GLUCOPHAGE XR) 500 MG 24 hr tablet  metoprolol tartrate (LOPRESSOR) 25 MG tablet  montelukast (SINGULAIR) 10 MG tablet  NEXIUM 40 MG DR capsule  polyethylene glycol (MIRALAX) 17 GM/Dose powder  predniSONE (DELTASONE) 10 MG tablet  semaglutide (OZEMPIC, 0.25 OR 0.5 MG/DOSE,) 2 MG/3ML pen  sennosides (SENOKOT) 8.6 MG tablet  sucralfate (CARAFATE) 1 GM/10ML suspension  tiZANidine (ZANAFLEX) 2 MG tablet  vitamin D3 (CHOLECALCIFEROL) 10 MCG (400 UNIT) capsule        ALLERGIES:  No Known Allergies    FAMILY HISTORY:  Family History   Problem Relation Age of Onset    Other - See Comments Mother          of an intestinal problem    Ulcers Father          of gastritis    Breast Cancer No family hx of     Ovarian Cancer No family hx of     Colon Cancer No family hx of        SOCIAL HISTORY:   Social History     Socioeconomic History    Marital status:    Tobacco Use    Smoking status: Former     Current packs/day: 0.00     Average packs/day: 1 pack/day for 30.0 years (30.0 ttl pk-yrs)     Types: Cigarettes     Start date: 1973     Quit date: 2003     Years since quittin.5     Passive exposure: Never    Smokeless tobacco: Never    Tobacco comments:     No passive exposure   Vaping Use    Vaping status: Never Used    Substance and Sexual Activity    Alcohol use: No    Drug use: No    Sexual activity: Yes     Partners: Male   Social History Narrative    12/22/2017 The patient lives with her daughter-in-law (who is present), , son, and 2 grandchildren (total of 6 people). Immigrant.     Social Drivers of Health     Financial Resource Strain: Low Risk  (4/30/2025)    Financial Resource Strain     Within the past 12 months, have you or your family members you live with been unable to get utilities (heat, electricity) when it was really needed?: No   Food Insecurity: Low Risk  (4/30/2025)    Food Insecurity     Within the past 12 months, did you worry that your food would run out before you got money to buy more?: No     Within the past 12 months, did the food you bought just not last and you didn t have money to get more?: No   Transportation Needs: Low Risk  (4/30/2025)    Transportation Needs     Within the past 12 months, has lack of transportation kept you from medical appointments, getting your medicines, non-medical meetings or appointments, work, or from getting things that you need?: No   Physical Activity: Inactive (4/30/2025)    Exercise Vital Sign     Days of Exercise per Week: 0 days     Minutes of Exercise per Session: 0 min   Stress: No Stress Concern Present (4/30/2025)    [employer] [place] [employer] - Occupational Stress Questionnaire     Feeling of Stress : Only a little   Social Connections: Unknown (4/30/2025)    Social Connection and Isolation Panel [NHANES]     Frequency of Social Gatherings with Friends and Family: Once a week   Interpersonal Safety: Unknown (5/1/2025)    Interpersonal Safety     Do you feel physically and emotionally safe where you currently live?: Patient unable to answer     Within the past 12 months, have you been hit, slapped, kicked or otherwise physically hurt by someone?: Patient unable to answer     Within the past 12 months, have you been humiliated or  emotionally abused in other ways by your partner or ex-partner?: Patient unable to answer   Housing Stability: Low Risk  (4/30/2025)    Housing Stability     Do you have housing? : Yes     Are you worried about losing your housing?: No       VITALS:  Patient Vitals for the past 24 hrs:   BP Temp Temp src Pulse Resp SpO2 Height Weight   07/08/25 2150 -- -- -- 87 20 97 % -- --   07/08/25 1603 138/82 -- -- 97 -- 96 % -- --   07/08/25 1529 -- 98.4  F (36.9  C) Oral -- -- -- -- --   07/08/25 1528 136/86 -- -- 97 28 98 % 1.524 m (5') 56.7 kg (125 lb)       PHYSICAL EXAM    Constitutional:  Well developed, Well nourished,  HENT:  Normocephalic, Atraumatic, Oropharynx moist, Nose normal.   Eyes:  EOMI, Conjunctiva normal, No discharge.   Respiratory:  Normal breath sounds, No respiratory distress, No wheezing, No chest tenderness.   Cardiovascular:  Normal heart rate, Normal rhythm, No murmurs  GI:  Soft, No tenderness, No guarding, No CVA tenderness.   Musculoskeletal:  No tenderness to palpation or major deformities noted.   Neurologic:  Alert & oriented x 3, No focal deficits noted.   Psychiatric:  Affect normal, Judgment normal, Mood normal.        LAB:  All pertinent labs reviewed and interpreted.  Results for orders placed or performed during the hospital encounter of 07/08/25                                                                                 CBC with platelets   Result Value Ref Range    WBC Count 11.2 (H) 4.0 - 11.0 10e3/uL    RBC Count 4.60 3.80 - 5.20 10e6/uL    Hemoglobin 12.3 11.7 - 15.7 g/dL    Hematocrit 38.5 35.0 - 47.0 %    MCV 84 78 - 100 fL    MCH 26.7 26.5 - 33.0 pg    MCHC 31.9 31.5 - 36.5 g/dL    RDW 14.1 10.0 - 15.0 %    Platelet Count 210 150 - 450 10e3/uL   Basic metabolic panel   Result Value Ref Range    Sodium 136 135 - 145 mmol/L    Potassium 4.0 3.4 - 5.3 mmol/L    Chloride 102 98 - 107 mmol/L    Carbon Dioxide (CO2) 20 (L) 22 - 29 mmol/L    Anion Gap 14 7 - 15 mmol/L    Urea  Nitrogen 14.7 6.0 - 20.0 mg/dL    Creatinine 0.53 0.51 - 0.95 mg/dL    GFR Estimate >90 >60 mL/min/1.73m2    Calcium 9.2 8.8 - 10.4 mg/dL    Glucose 243 (H) 70 - 99 mg/dL   Result Value Ref Range    Troponin T, High Sensitivity <6 <=14 ng/L   Result Value Ref Range    Troponin T, High Sensitivity <6 <=14 ng/L   NT-proBNP   Result Value Ref Range    NT-proBNP <36 0 - 226 pg/mL   Hepatic function panel   Result Value Ref Range    Protein Total 6.7 6.4 - 8.3 g/dL    Albumin 3.9 3.5 - 5.2 g/dL    Bilirubin Total 0.4 <=1.2 mg/dL    Alkaline Phosphatase 104 40 - 150 U/L    AST 34 0 - 45 U/L    ALT 50 0 - 50 U/L    Bilirubin Direct 0.17 0.00 - 0.30 mg/dL   Result Value Ref Range    Lipase 42 13 - 60 U/L       RADIOLOGY:  I have independently reviewed and interpreted the above imaging, pending the final radiology read.  CT Chest Pulmonary Embolism w Contrast   Final Result   IMPRESSION:   1.  No pulmonary artery embolus or other acute process within the chest.      XR Chest 2 Views   Final Result   IMPRESSION: Heart and mediastinal size normal. No acute airspace opacities. No pleural effusion or pneumothorax.          EKG:    Normal sinus rhythm at 97 bpm.  No signs of acute ischemia.  QRS 90 ms, QTc 457 ms.  No significant change compared to prior EKG.    I have independently reviewed and interpreted this EKG          I, Marques Gramajo, am serving as a scribe to document services personally performed by Dr. Jeffries based on my observation and the provider's statements to me. ISunny DO attest that Marques Gramajo is acting in a scribe capacity, has observed my performance of the services and has documented them in accordance with my direction.    Sunny Jeffries DO  Emergency Medicine  [facility] EMERGENCY DEPARTMENT  [address] [place] [id number] [phone]  Dept: [phone]     Sunny Jeffries DO  07/08/25 3875

## 2025-07-08 NOTE — ED TRIAGE NOTES
Pt developed chest pain, palpitations, and shortness of breath 20 minutes ago. Pt has hx of COPD and previous heart attack.      Triage Assessment (Adult)       Row Name 07/08/25 1525          Triage Assessment    Airway WDL WDL        Respiratory WDL    Respiratory WDL rhythm/pattern     Rhythm/Pattern, Respiratory shortness of breath;unlabored;pattern regular        Skin Circulation/Temperature WDL    Skin Circulation/Temperature WDL WDL        Cardiac WDL    Cardiac WDL chest pain        Chest Pain Assessment    Chest Pain Location substernal     Character tightness        Peripheral/Neurovascular WDL    Peripheral Neurovascular WDL WDL        Cognitive/Neuro/Behavioral WDL    Cognitive/Neuro/Behavioral WDL WDL

## 2025-07-09 ENCOUNTER — MEDICAL CORRESPONDENCE (OUTPATIENT)
Dept: HEALTH INFORMATION MANAGEMENT | Facility: CLINIC | Age: 55
End: 2025-07-09
Payer: COMMERCIAL

## 2025-07-14 ENCOUNTER — TELEPHONE (OUTPATIENT)
Dept: FAMILY MEDICINE | Facility: CLINIC | Age: 55
End: 2025-07-14
Payer: COMMERCIAL

## 2025-07-14 NOTE — TELEPHONE ENCOUNTER
Forms/Letter Request    Type of form/letter: [facility] [employer] Care Plan 07/09/2025 to 09/06/2025    Do we have the form/letter: Yes: 07/13/25 at 6:20 pm    Who is the form from? Home care    Where did/will the form come from? form was faxed in    When is form/letter needed by: N/A    How would you like the form/letter returned: Fax : [phone]     (TC) collected forms from . Forms do not need to be filled out,  for provider review. Forms placed in provider s blue folder. Thanks.

## 2025-07-15 ENCOUNTER — OFFICE VISIT (OUTPATIENT)
Dept: PULMONOLOGY | Facility: CLINIC | Age: 55
End: 2025-07-15
Attending: INTERNAL MEDICINE
Payer: COMMERCIAL

## 2025-07-15 VITALS
HEART RATE: 98 BPM | OXYGEN SATURATION: 97 % | WEIGHT: 136 LBS | DIASTOLIC BLOOD PRESSURE: 81 MMHG | BODY MASS INDEX: 26.56 KG/M2 | SYSTOLIC BLOOD PRESSURE: 114 MMHG

## 2025-07-15 DIAGNOSIS — R06.2 WHEEZING: ICD-10-CM

## 2025-07-15 DIAGNOSIS — J82.83 EOSINOPHILIC ASTHMA: ICD-10-CM

## 2025-07-15 DIAGNOSIS — R05.1 ACUTE COUGH: ICD-10-CM

## 2025-07-15 DIAGNOSIS — J45.51 SEVERE PERSISTENT ASTHMA WITH EXACERBATION (H): Primary | ICD-10-CM

## 2025-07-15 PROCEDURE — 99214 OFFICE O/P EST MOD 30 MIN: CPT | Performed by: INTERNAL MEDICINE

## 2025-07-15 PROCEDURE — G2211 COMPLEX E/M VISIT ADD ON: HCPCS | Performed by: INTERNAL MEDICINE

## 2025-07-15 PROCEDURE — 3079F DIAST BP 80-89 MM HG: CPT | Performed by: INTERNAL MEDICINE

## 2025-07-15 PROCEDURE — 3074F SYST BP LT 130 MM HG: CPT | Performed by: INTERNAL MEDICINE

## 2025-07-15 RX ORDER — BUDESONIDE AND FORMOTEROL FUMARATE DIHYDRATE 160; 4.5 UG/1; UG/1
AEROSOL RESPIRATORY (INHALATION)
Qty: 10.2 G | Refills: 11 | Status: SHIPPED | OUTPATIENT
Start: 2025-07-15

## 2025-07-15 RX ORDER — LEVALBUTEROL TARTRATE 45 UG/1
2 AEROSOL, METERED ORAL EVERY 4 HOURS PRN
Qty: 15 G | Refills: 11 | Status: SHIPPED | OUTPATIENT
Start: 2025-07-15

## 2025-07-15 RX ORDER — LEVALBUTEROL INHALATION SOLUTION 0.63 MG/3ML
1 SOLUTION RESPIRATORY (INHALATION) EVERY 4 HOURS PRN
Qty: 90 ML | Refills: 11 | Status: SHIPPED | OUTPATIENT
Start: 2025-07-15

## 2025-07-15 ASSESSMENT — ASTHMA QUESTIONNAIRES
QUESTION_2 LAST FOUR WEEKS HOW OFTEN HAVE YOU HAD SHORTNESS OF BREATH: MORE THAN ONCE A DAY
QUESTION_5 LAST FOUR WEEKS HOW WOULD YOU RATE YOUR ASTHMA CONTROL: POORLY CONTROLLED
HOSPITALIZATION_OVERNIGHT_LAST_YEAR_TOTAL: TWO
QUESTION_1 LAST FOUR WEEKS HOW MUCH OF THE TIME DID YOUR ASTHMA KEEP YOU FROM GETTING AS MUCH DONE AT WORK, SCHOOL OR AT HOME: ALL OF THE TIME
EMERGENCY_ROOM_LAST_YEAR_TOTAL: THREE OR MORE
QUESTION_4 LAST FOUR WEEKS HOW OFTEN HAVE YOU USED YOUR RESCUE INHALER OR NEBULIZER MEDICATION (SUCH AS ALBUTEROL): THREE OR MORE TIMES PER DAY
ACT_TOTALSCORE: 6
QUESTION_3 LAST FOUR WEEKS HOW OFTEN DID YOUR ASTHMA SYMPTOMS (WHEEZING, COUGHING, SHORTNESS OF BREATH, CHEST TIGHTNESS OR PAIN) WAKE YOU UP AT NIGHT OR EARLIER THAN USUAL IN THE MORNING: FOUR OR MORE NIGHTS A WEEK

## 2025-07-15 NOTE — PATIENT INSTRUCTIONS
Please STOP taking albuterol and Duo-Nebs.  Please use your Symbicort inhaler twice a day every day whether or not you are short of breath, this is not a rescue inhaler so do not use this if you are acutely short of breath.  Please be sure to gargle your mouth after using your Symbicort inhaler.  You can use your Symbicort 1 puff up to 6 times a day for rescue.  I have switched you to Levalbuterol (Xopenex) inhaler and nebulizer. Please use this up to 4 times a day when you are more short of breath.

## 2025-07-15 NOTE — PROGRESS NOTES
Pulmonary Outpatient Clinic Follow Up      Assessment/Plan:    55 year old woman with history of organic fuel exposure in the home was previously had nondiagnostic PFTs.  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 There is also vocal cord dysfunction contributing to wheezing and dyspnea.     Severe persistent asthma with exacerbation: Continues to have severe symptoms and has had repeated cardiac workup for the symptoms as well.  She responds intermittently to steroids but continues to require short acting bronchodilators 3-4 times a day and has not responded well to monoclonal antibodies.  Clinic visit, she was reinitiated on durvalumab by Dr. Almendarez and thinks that she has had some variable clinical response to this.    I have discontinued her DuoNeb's albuterol for rescue given recurrent palpitations and history of SVTs.  Continue Symbicort BID.   I have prescribed Symbicort per the Smart action plan and asked that he use this up to 6-7 times a day for rescue.    Will resume Spiriva at the next clinic visit.  Continue Singulair daily.  As needed albuterol rescue.  Resumption of Dupixent through allergy.  Action plan: Prednisone taper 40mg daily and decrease by 10mg every 3 days.   Questionable eosinophilic esophagitis: Has significant epigastric abdominal pain likely secondary to gastritis from chronic steroid use vs eosinophilic esophagitis.  Follows with GI for this; have asked them to see [facility] soon.  Omeprazole 40 mg a day.  As needed Tums.  Concern for Upper airway obstruction given flattening of flow volume loop limbs.    -She has been referred to ENT and speech therapy. They prefer to have these appointments within [place] so I have ordered them for her    RTC 2 months    Tamra Duong MD  Pulmonary and Critical Care  [facility]      The longitudinal  plan of care for the diagnosis(es)/condition(s) as documented were addressed during this visit. Due to the added complexity in care, I will continue to support Kulwant in the subsequent management and with ongoing continuity of care.    Total time spent with patient and reviewing records 32 minutes.    Subjective:   Patient ID: Ms. Amin is a 55 year old female with a past medical history significant for anxiety, arthritis, questionable asthma versus COPD, diabetes, hypertension and a prior history of SVT presents with her daughter in law for a follow-up visit.  As before, patient continues to be symptomatic particularly these days.  She had briefly seen Dr. Priscila abbasi at [facility] and her Spiriva inhaler had been discontinued at that visit.  She continues to use her Symbicort every 12 hours and uses her albuterol several times a day.  Today she is complaining of palpitations which she has not had in a very long time.  It is noted that at her last clinic visit she saw Dr. Almendarez and was reinitiated on Dupixent which she has been compliant with.  She presently complains of chest tightness and chest pain.          11/5/2024    12:39 PM 12/13/2024     1:00 PM 7/15/2025     9:17 AM   ACT Total Scores   ACT TOTAL SCORE (Goal Greater than or Equal to 20) 6  9 6    In the past 12 months, how many times did you visit the emergency room for your asthma without being admitted to the hospital? 3 0 3   In the past 12 months, how many times were you hospitalized overnight because of your asthma? 2 0 2       Patient-reported       Current Outpatient Medications   Medication Sig Dispense Refill    acetaminophen (TYLENOL) 500 MG tablet TAKE 1 TABLET (500 MG) BY MOUTH EVERY 6 HOURS AS NEEDED FOR MILD PAIN. 100 tablet 1    albuterol (PROAIR HFA/PROVENTIL HFA/VENTOLIN HFA) 108 (90 Base) MCG/ACT inhaler Inhale 2 puffs into the lungs every 6 hours as needed for shortness of breath, wheezing or cough. 18 g 11    amitriptyline  (ELAVIL) 50 MG tablet Take 1 tablet (50 mg) by mouth at bedtime. 90 tablet 1    aspirin 81 MG EC tablet Take 1 tablet (81 mg) by mouth daily. 90 tablet 3    atorvastatin (LIPITOR) 80 MG tablet Take 1 tablet (80 mg) by mouth daily. 90 tablet 3    BD EZ U/F 32G X 4 MM insulin pen needle USE 3 PEN NEEDLES DAILY OR AS DIRECTED *34 DAY SUPPLY* 300 each 2    budesonide-formoterol (SYMBICORT/BREYNA) 160-4.5 MCG/ACT Inhaler Inhale 2 puffs into the lungs 2 times daily.      calcium carbonate (TUMS) 500 MG chewable tablet Take 1 chew tab by mouth 2 times daily as needed for heartburn.      colchicine (COLCRYS) 0.6 MG tablet Take 1 tablet (0.6 mg) by mouth daily. 90 tablet 3    Continuous Glucose  (FREESTYLE MONICA 14 DAY READER) MICHAEL Use to read blood sugars as per 's instructions. 1 each 0    Continuous Glucose Sensor (FREESTYLE MONICA 2 SENSOR) MISC 1 EACH EVERY 14 DAYS USE 1 SENSOR EVERY 14 DAYS. USE TO READ BLOOD SUGARS PER 'S INSTRUCTIONS. 2 each 11    CONTOUR NEXT TEST test strip USE 1 EACH AS DIRECTED 3 (THREE) TIMES A DAY. 50 strip 11    dextromethorphan (DELSYM) 30 MG/5ML liquid Take 10 mLs (60 mg) by mouth 2 times daily. 89 mL 0    diclofenac (VOLTAREN) 1 % topical gel Apply 4 g topically 4 times daily 350 g 3    dupilumab (DUPIXENT) 300 MG/2ML prefilled pen Inject 2 mLs (300 mg) subcutaneously every 14 days. 4 mL 2    ferrous sulfate (FEROSUL) 325 (65 Fe) MG tablet Take 1 tablet (325 mg) by mouth daily (with breakfast). 90 tablet 4    FLUoxetine (PROZAC) 20 MG capsule TAKE 1 CAPSULE (20 MG) BY MOUTH DAILY. 90 capsule 1    gabapentin (NEURONTIN) 600 MG tablet TAKE 1 PILL (600 MG) BY MOUTH 3 TIMES DAILY 90 tablet 3    galcanezumab-gnlm (EMGALITY) 120 MG/ML injection Inject 1 mL (120 mg) subcutaneously every 28 days. 1 mL 3    Global Inject Ease Lancets 30G MISC USE 1 EACH AS DIRECTED 3 (THREE) TIMES A DAY. DISPENSE BRAND PER PATIENT'S INSURANCE AT PHARMACY DISCRETION.(E11.9) *34 DAYS*  200 each 11    insulin aspart (NOVOLOG PEN) 100 UNIT/ML pen Inject 16 Units subcutaneously 2 times daily (before meals). 45 mL 3    insulin glargine (LANTUS PEN) 100 UNIT/ML pen Inject 44 Units subcutaneously every morning. 45 mL 3    ipratropium - albuterol 0.5 mg/2.5 mg/3 mL (DUONEB) 0.5-2.5 (3) MG/3ML neb solution Take 1 vial (3 mLs) by nebulization 4 times daily. 360 mL 11    Lidocaine (LIDOCARE) 4 % Patch Place 1 patch onto the skin every 24 hours. To prevent lidocaine toxicity, patient should be patch free for 12 hrs daily. 30 patch 1    meclizine (ANTIVERT) 25 MG tablet Take 1 tablet (25 mg) by mouth 3 times daily as needed for dizziness. 90 tablet 1    metFORMIN (GLUCOPHAGE XR) 500 MG 24 hr tablet Take 2 tablets (1,000 mg) by mouth 2 times daily (with meals). 360 tablet 3    metoprolol tartrate (LOPRESSOR) 25 MG tablet TAKE ONE-HALF TABLET (12.5 MG TOTAL) BY MOUTH 2 (TWO) TIMES A DAY FOR BLOOD PRESSURE 45 tablet 3    montelukast (SINGULAIR) 10 MG tablet TAKE 1 TABLET (10 MG) BY MOUTH AT BEDTIME 30 tablet 9    NEXIUM 40 MG DR capsule Take 40 mg by mouth every morning (before breakfast).      polyethylene glycol (MIRALAX) 17 GM/Dose powder Take 17 g (1 Capful) by mouth 2 times daily as needed for constipation. 510 g 11    sennosides (SENOKOT) 8.6 MG tablet Take 1 tablet by mouth daily as needed for constipation. 30 tablet 3    sucralfate (CARAFATE) 1 GM/10ML suspension Take 10 mLs (1 g) by mouth 4 times daily. 3600 mL 1    tiZANidine (ZANAFLEX) 2 MG tablet Take 1 tablet (2 mg) by mouth 3 times daily as needed for muscle spasms. 270 tablet 1    vitamin D3 (CHOLECALCIFEROL) 10 MCG (400 UNIT) capsule Take 1 capsule (400 Units) by mouth daily. 90 capsule 3    predniSONE (DELTASONE) 10 MG tablet Take 4 tabs daily x 3 days, THEN 3 tabs daily x 3 days, THEN 2 tabs daily x 3 days, THEN 1 tab daily x 3 days (Patient not taking: Reported on 7/15/2025) 30 tablet 0    semaglutide (OZEMPIC, 0.25 OR 0.5 MG/DOSE,) 2 MG/3ML  pen Inject 0.25 mg subcutaneously once a week. (Patient not taking: Reported on 7/15/2025) 2 mL 3         Physical Exam   /81   Pulse 98   Wt 61.7 kg (136 lb)   SpO2 97%   BMI 26.56 kg/m      Physical Exam  Constitutional:       General: She is not in acute distress.     Appearance: She is not ill-appearing or diaphoretic.   Cardiovascular:      Rate and Rhythm: Normal rate and regular rhythm.      Pulses: Normal pulses.      Heart sounds: Normal heart sounds.   Pulmonary:      Effort: Pulmonary effort is normal. No respiratory distress.      Breath sounds: Wheezing present. No rhonchi.   Musculoskeletal:      Right lower leg: No edema.      Left lower leg: No edema.   Skin:     General: Skin is warm and dry.      Findings: No rash.   Neurological:      Mental Status: She is alert.   Psychiatric:         Behavior: Behavior normal.            Latest Reference Range & Units 02/07/22 15:47   Neutrophil Cytoplasmic Antibody <1:10  <1:10   Neutrophil Cytoplasmic Antibody Pattern  The ANCA IFA is <1:10.  No further testing will be performed.      Latest Reference Range & Units 02/07/22 15:47   Schistosoma Ab IgG Negative  Negative [1]   Strongyloides Bere IgG <=0.9 IV 0.4 [2]      Latest Reference Range & Units 02/07/22 15:47   Immunoglobulin E <=214 kU/L 74 [1]   Allergen Fungimold A Fumigatus IgE <=0.34 kU/L <0.10 [2]   Aspergillus Fumagatis 1 Antibody None Detected  None Detected [3]   Aspergillus Fumagatis 6 Antibody None Detected  None Detected [4]   Allergen, Interp, Immunocap Score IgE  See Note [5]     EXAM: XR CHEST 2 VIEWS  LOCATION: [facility]  DATE: 4/5/2025     INDICATION: Short of breath wheezing  COMPARISON: 1/19/2025                                                                       IMPRESSION: Minimal bibasilar atelectasis/scarring. No effusions or pneumothorax. Heart size is normal. Degenerative changes of the spine.      ECHO 5/4/2022  Interpretation Summary  Left  ventricular size, wall motion and function are normal. The ejection  fraction is 55-60%.  There is borderline anterior, septal, and apical wall hypokinesis.  Normal right ventricle size and systolic function.  No hemodynamically significant valvular abnormalities on 2D or color flow imaging.      The longitudinal plan of care for the diagnosis(es)/condition(s) as documented were addressed during this visit. Due to the added complexity in care, I will continue to support Kulwant in the subsequent management and with ongoing continuity of care.

## 2025-07-25 ENCOUNTER — OFFICE VISIT (OUTPATIENT)
Dept: OTOLARYNGOLOGY | Facility: CLINIC | Age: 55
End: 2025-07-25
Attending: INTERNAL MEDICINE
Payer: COMMERCIAL

## 2025-07-25 ENCOUNTER — THERAPY VISIT (OUTPATIENT)
Dept: SPEECH THERAPY | Facility: CLINIC | Age: 55
End: 2025-07-25
Payer: COMMERCIAL

## 2025-07-25 DIAGNOSIS — J38.3 VOCAL CORD DYSFUNCTION: ICD-10-CM

## 2025-07-25 DIAGNOSIS — R13.12 OROPHARYNGEAL DYSPHAGIA: ICD-10-CM

## 2025-07-25 DIAGNOSIS — R49.0 DYSPHONIA: Primary | ICD-10-CM

## 2025-07-25 DIAGNOSIS — R05.3 CHRONIC COUGH: Primary | ICD-10-CM

## 2025-07-25 NOTE — PROGRESS NOTES
[facility] VOICE CLINIC  Evaluation report    Clinician: Sunny Burnham M.M., M.A., CCC/SLP  Seen in conjunction with: Dr. Brunlida Parekh  Referring physician:  Dr. Almendarez  Patient: Kulwant Amin  Date of Visit: 7/25/2025    HISTORY  Chief complaint: Kulwant Amin is a 55 year old woman presenting today for evaluation of breathing, cough, and swallowing difficulties.    Chart Review: She has a history significant for asthma versus COPD, diabetes, HTN, SVT, anxiety, arthritis.  Patient was referred to our clinic by Dr. Barbie Almendarez from pulmonology.  She has a history of organic fume exposure in the home, and endorses severe eating difficulties that have not been responsive to ICS, monoclonal antibodies, courses of steroids, and inhaled bronchial's.  When she was seen in April 2025 she continued to require short acting bronchodilators 3-4 times a day.  She was encouraged to continue budesonide nebs and DuoNebs 3 times daily.  PFTs demonstrated flattening of the inspiratory and expiratory flow volume loop limbs.  Concern for vocal cord dysfunction had been raised in the past and the patient wished for evaluation to be performed within our system and this appointment was generated.    Of note she has previously been seen in our clinic in 2022 in which she describes difficulty breathing associated with coughing episodes triggered by exercise, cold air, and strong odors such as perfume gas, and diesel smells.  With imitated symptoms patient demonstrated inspiratory stridor.  Patient completed 1 session of speech therapy with Kunal Mendoza, PhD-SLP was subsequently lost to follow-up.    More recently she has in the interim been working with Audrey Orellana CCC-SLP [facility] [employer].  Today she reports that she was seen by pulmonoloty who said there was a sound in her throat that needed zoraida evaluated by ENT.  She feels this sound has been worsening gradually over the past 4-5 years without clear  cause.  With regards to her speech work she reports that it has not been helpful as she cannot do the exercises without coughing.      CURRENT SYMPTOMS PER PATIENT REPORT:  VOICE  Voice is OK but sometimes it's getting louder  Feels this is realted to her breathing issue    COUGH/THROAT CLEARING  Onset and inciting incident: 10-11 years unclear inciting incident  Progression: gradually worsening  Nothing has improved symptoms  Comes in conjunction with breathing symptoms, weather change,   The urge to cough comes from her chest  Medications help (benedryl) but it comes back when she stops)    SWALLOWING  Started at the same time as the breathing issue  Stable over time  Solid foods get stuck on the right side  Sips of liquid helps  Will occasionally make her cough  Sips of liquid will make her cough  Every time  Weight is relatively stable  No PNA in the past 6 months    BREATHING  Started 10-11 years ago.  Since that time she has tried numerous treatments  She feels that none of the techniques have been helpful  Currently she reports that her breathing is noisy in her throat (not appreciable by the clinician  Can be audible to her family members  Hard to describe the sound  When sounds were modeled it can be both an expiratory wheeze or stridor  Triggers:  Breathing is bad at baseline  Cooking spicey food  Strong smells (will make her vomit, happens once every few weeks)  exertion    ADDITIONAL  Throat discomfort  Only with excessive cough  Heartburn  Feels that this is well-controlled with current regimen       OTHER PERTINENT HISTORY  Please see Dr. Parekh's note for additional details    Past Medical History:   Diagnosis Date    Acute asthma exacerbation 01/06/2020    Anxiety     Arthritis     Asthma exacerbation 11/19/2015    Chronic obstructive pulmonary disease, unspecified COPD type (H)     COPD (chronic obstructive pulmonary disease) (H)     Coronary artery disease due to lipid rich plaque     Depression      Diabetes (H)     Epigastric pain 12/15/2021    Essential hypertension     GERD (gastroesophageal reflux disease)     Infection due to 2019 novel coronavirus 01/03/2022    positive with COVID-19 on January 3, 2022    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    Microcytic anemia 11/17/2019    Motion sickness     PONV (postoperative nausea and vomiting)     S/P coronary artery stent placement 02/02/2018    Sleep apnea     TB lung, latent     9 mos INH     Past Surgical History:   Procedure Laterality Date    CORONARY STENT PLACEMENT  2018    CV CORONARY ANGIOGRAM N/A 1/25/2018    Procedure: Coronary Angiogram;  Surgeon: Angelo Serrano MD;  Location: [facility] Cath Lab;  Service:     CV CORONARY ANGIOGRAM N/A 5/5/2022    Procedure: Coronary Angiogram;  Surgeon: Irwin Cano MD;  Location: [facility] CATH LAB CV    CV CORONARY ANGIOGRAM  5/5/2022    Procedure: ;  Surgeon: Irwin Cano MD;  Location: [facility] CATH LAB CV    ESOPHAGOSCOPY, GASTROSCOPY, DUODENOSCOPY (EGD), COMBINED N/A 11/21/2024    Procedure: ESOPHAGOGASTRODUODENOSCOPY with BIOPSY;  Surgeon: Gomez Bella MD;  Location: [facility] OR    AK ESOPHAGOGASTRODUODENOSCOPY TRANSORAL DIAGNOSTIC N/A 12/10/2018    Procedure: ESOPHAGOGASTRODUODENOSCOPY (EGD);  Surgeon: Eddie Renteria MD;  Location: [facility];  Service: Gastroenterology    AK ESOPHAGOGASTRODUODENOSCOPY TRANSORAL DIAGNOSTIC N/A 12/3/2020    Procedure: ESOPHAGOGASTRODUODENOSCOPY (EGD) with biospies ;  Surgeon: Avi Crow MD;  Location: [facility];  Service: Gastroenterology    [facility]A 12/10/2018    Procedure: COLONOSCOPY with polypectomy using biopsy forceps;  Surgeon: Eddie Renteria MD;  Location: [facility];  Service: Gastroenterology       OBJECTIVE    PERCEPTUAL EVALUATION (CPT 73814)  POSTURE / TENSION:   Forward rolled shoulder posture    BREATHING:   Intermittent noisy breathing on exhalatory cycle with mild corresponding  inspiratory noise  Usually followed by cough which sounded mildly productive  Respiratory mechanics appear grossly normal without visible accessory muscle recruitment or heavy upper thoracic predominance    VOICE:  Roughness: Mild Consistent  Breathiness: Minimal  Strain: Mild Intermittent  Loudness  Conversational speech:  WNL  Pitch:  Conversational speech:  Mildly elevated  Pitch glide: WFL  Resonance:  Conversational speech:  laryngeal pharyngeal resonance    COUGH/THROAT CLEARING:  Frequent wet sounding cough in conjunction with increased respiratory noise    LARYNGEAL EXAMINATION  Procedure: Flexible endoscopy with chip-tip technology without stroboscopy, left nostril; topical anesthesia with 3% Lidocaine and 0.25% phenylephrine was not applied.   Performed by: Dr. Brunilda Parekh  The laryngeal and pharyngeal structures were evaluated for gross appearance, mobility, function, and focal lesions / abnormalities of the associated mucosa.    All findings were within normal limits with the exception of the following salient features:   Flexibility in epiglottis (visible vibration with breathing)  Moderate postcricoid fullness/erythema  Vocal folds appear essentially smooth and straight without focal lesion  With episodes of breathing difficulty patent glottic airway was seen without visible vibration or obstruction from supraglottic tissues  No paradoxical vocal fold motion or supraglottic collapse seen    The laryngeal exam was reviewed with Ms. Amin, and I provided pertinent explanations, as well as written and oral information.      ASSESSMENT / PLAN  IMPRESSIONS: Kulwant Amin is presenting today with a greater than 10-year history of difficulty breathing within a broad her pulmonary history of COPD versus asthma that has been modestly responsive to pharmacologic management.  Today's evaluation was able to offer laryngeal visualization during symptomatic breathing, and no limitation to the glottic airway or  collapse of supraglottic structures was seen.  Sound source he is clearly below the level of the glottis, and Dr. Parekh feels this is representative of tracheomalacia.  Respiratory mechanics were generally within functional limits and are not felt to be a primary component of the patient's cough or breathing concerns.  Based on today's evaluation I do not feel voice and upper airway focused speech therapy is warranted at this time.  A swallow evaluation was completed by my colleague Rajani Mcdaniel CCC-SLP along with Dr. Parekh.  Please see their notes from today's date for additional details of this facet of today's evaluation.    Certification period: Evaluation only    This treatment plan was developed with the patient who agreed with the recommendations.    TOTAL SERVICE TIME: 60 minutes  EVALUATION OF VOICE AND RESONANCE (63073)  NO CHARGE FACILITY FEE (09058)    Sunny Burnham M.M., M.A., CCC-SLP  Speech-Language Pathologist  Certificate of Vocology  [phone]    *this report was created in part through the use of computerized dictation software, and though reviewed following completion, some typographic errors may persist.  If there is confusion regarding any of this notes contents, please contact me for clarification.*

## 2025-07-25 NOTE — LETTER
7/25/2025       RE: Kulwant Amin  [address] [facility] [address]     Dear Colleague,    Thank you for referring your patient, Kulwant Amin, to the [facility] VOICE CLINIC [place] [facility]. Please see a copy of my visit note below.    [facility] VOICE CLINIC  Evaluation report    Clinician: Sunny Burnham M.M., M.A., CCC/SLP  Seen in conjunction with: Dr. Brunilda Parekh  Referring physician:  Dr. Almendarez  Patient: Kulwant Amin  Date of Visit: 7/25/2025    HISTORY  Chief complaint: Kulwant Amin is a 55 year old woman presenting today for evaluation of breathing, cough, and swallowing difficulties.    Chart Review: She has a history significant for asthma versus COPD, diabetes, HTN, SVT, anxiety, arthritis.  Patient was referred to our clinic by Dr. Barbie Almendarez from pulmonology.  She has a history of organic fume exposure in the home, and endorses severe eating difficulties that have not been responsive to ICS, monoclonal antibodies, courses of steroids, and inhaled bronchial's.  When she was seen in April 2025 she continued to require short acting bronchodilators 3-4 times a day.  She was encouraged to continue budesonide nebs and DuoNebs 3 times daily.  PFTs demonstrated flattening of the inspiratory and expiratory flow volume loop limbs.  Concern for vocal cord dysfunction had been raised in the past and the patient wished for evaluation to be performed within our system and this appointment was generated.    Of note she has previously been seen in our clinic in 2022 in which she describes difficulty breathing associated with coughing episodes triggered by exercise, cold air, and strong odors such as perfume gas, and diesel smells.  With imitated symptoms patient demonstrated inspiratory stridor.  Patient completed 1 session of speech therapy with Kunal Mendoza, PhD-SLP was subsequently lost to follow-up.    More recently she has in the interim been  working with Audrey Orellana CCC-SLP with [employer] 2 times subsequently.  Today she reports that she was seen by pulmonoloty who said there was a sound in her throat that needed zoraida evaluated by ENT.  She feels this sound has been worsening gradually over the past 4-5 years without clear cause.  With regards to her speech work she reports that it has not been helpful as she cannot do the exercises without coughing.      CURRENT SYMPTOMS PER PATIENT REPORT:  VOICE  Voice is OK but sometimes it's getting louder  Feels this is realted to her breathing issue    COUGH/THROAT CLEARING  Onset and inciting incident: 10-11 years unclear inciting incident  Progression: gradually worsening  Nothing has improved symptoms  Comes in conjunction with breathing symptoms, weather change,   The urge to cough comes from her chest  Medications help (benedryl) but it comes back when she stops)    SWALLOWING  Started at the same time as the breathing issue  Stable over time  Solid foods get stuck on the right side  Sips of liquid helps  Will occasionally make her cough  Sips of liquid will make her cough  Every time  Weight is relatively stable  No PNA in the past 6 months    BREATHING  Started 10-11 years ago.  Since that time she has tried numerous treatments  She feels that none of the techniques have been helpful  Currently she reports that her breathing is noisy in her throat (not appreciable by the clinician  Can be audible to her family members  Hard to describe the sound  When sounds were modeled it can be both an expiratory wheeze or stridor  Triggers:  Breathing is bad at baseline  Cooking spicey food  Strong smells (will make her vomit, happens once every few weeks)  exertion    ADDITIONAL  Throat discomfort  Only with excessive cough  Heartburn  Feels that this is well-controlled with current regimen       OTHER PERTINENT HISTORY  Please see Dr. Parekh's note for additional details    Past Medical History:    Diagnosis Date     Acute asthma exacerbation 01/06/2020     Anxiety      Arthritis      Asthma exacerbation 11/19/2015     Chronic obstructive pulmonary disease, unspecified COPD type (H)      COPD (chronic obstructive pulmonary disease) (H)      Coronary artery disease due to lipid rich plaque      Depression      Diabetes (H)      Epigastric pain 12/15/2021     Essential hypertension      GERD (gastroesophageal reflux disease)      Infection due to 2019 novel coronavirus 01/03/2022    positive with COVID-19 on January 3, 2022     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     Microcytic anemia 11/17/2019     Motion sickness      PONV (postoperative nausea and vomiting)      S/P coronary artery stent placement 02/02/2018     Sleep apnea      TB lung, latent     9 mos INH     Past Surgical History:   Procedure Laterality Date     CORONARY STENT PLACEMENT  2018     CV CORONARY ANGIOGRAM N/A 1/25/2018    Procedure: Coronary Angiogram;  Surgeon: Angelo Serrano MD;  Location: [facility] Cath Lab;  Service:      CV CORONARY ANGIOGRAM N/A 5/5/2022    Procedure: Coronary Angiogram;  Surgeon: Irwin Cano MD;  Location: [facility] CV     CV CORONARY ANGIOGRAM  5/5/2022    Procedure: ;  Surgeon: Irwin Cano MD;  Location: [facility] CV     ESOPHAGOSCOPY, GASTROSCOPY, DUODENOSCOPY (EGD), COMBINED N/A 11/21/2024    Procedure: ESOPHAGOGASTRODUODENOSCOPY with BIOPSY;  Surgeon: Gomez Bella MD;  Location: [facility]     OR ESOPHAGOGASTRODUODENOSCOPY TRANSORAL DIAGNOSTIC N/A 12/10/2018    Procedure: ESOPHAGOGASTRODUODENOSCOPY (EGD);  Surgeon: Eddie Renteria MD;  Location: [facility] GI;  Service: Gastroenterology     OR ESOPHAGOGASTRODUODENOSCOPY TRANSORAL DIAGNOSTIC N/A 12/3/2020    Procedure: ESOPHAGOGASTRODUODENOSCOPY (EGD) with biospies ;  Surgeon: Avi Crow MD;  Location: [facility] GI;  Service: Gastroenterology     [facility] COLONOSCOPY W/WO BRUSH/WASH N/A 12/10/2018    Procedure:  COLONOSCOPY with polypectomy using biopsy forceps;  Surgeon: Eddie Renteria MD;  Location: [facility];  Service: Gastroenterology       OBJECTIVE    PERCEPTUAL EVALUATION (CPT 72921)  POSTURE / TENSION:   Forward rolled shoulder posture    BREATHING:   Intermittent noisy breathing on exhalatory cycle with mild corresponding inspiratory noise  Usually followed by cough which sounded mildly productive  Respiratory mechanics appear grossly normal without visible accessory muscle recruitment or heavy upper thoracic predominance    VOICE:  Roughness: Mild Consistent  Breathiness: Minimal  Strain: Mild Intermittent  Loudness  Conversational speech:  WNL  Pitch:  Conversational speech:  Mildly elevated  Pitch glide: WFL  Resonance:  Conversational speech:  laryngeal pharyngeal resonance    COUGH/THROAT CLEARING:  Frequent wet sounding cough in conjunction with increased respiratory noise    LARYNGEAL EXAMINATION  Procedure: Flexible endoscopy with chip-tip technology without stroboscopy, left nostril; topical anesthesia with 3% Lidocaine and 0.25% phenylephrine was not applied.   Performed by: Dr. Brunilda Parekh  The laryngeal and pharyngeal structures were evaluated for gross appearance, mobility, function, and focal lesions / abnormalities of the associated mucosa.    All findings were within normal limits with the exception of the following salient features:   Flexibility in epiglottis (visible vibration with breathing)  Moderate postcricoid fullness/erythema  Vocal folds appear essentially smooth and straight without focal lesion  With episodes of breathing difficulty patent glottic airway was seen without visible vibration or obstruction from supraglottic tissues  No paradoxical vocal fold motion or supraglottic collapse seen    The laryngeal exam was reviewed with Ms. Amin, and I provided pertinent explanations, as well as written and oral information.      ASSESSMENT / PLAN  IMPRESSIONS: Kulwant Amin is presenting  today with a greater than 10-year history of difficulty breathing within a broad her pulmonary history of COPD versus asthma that has been modestly responsive to pharmacologic management.  Today's evaluation was able to offer laryngeal visualization during symptomatic breathing, and no limitation to the glottic airway or collapse of supraglottic structures was seen.  Sound source he is clearly below the level of the glottis, and Dr. Parekh feels this is representative of tracheomalacia.  Respiratory mechanics were generally within functional limits and are not felt to be a primary component of the patient's cough or breathing concerns.  Based on today's evaluation I do not feel voice and upper airway focused speech therapy is warranted at this time.  A swallow evaluation was completed by my colleague Rajani Mcdaniel CCC-SLP along with Dr. Parekh.  Please see their notes from today's date for additional details of this facet of today's evaluation.    Certification period: Evaluation only    This treatment plan was developed with the patient who agreed with the recommendations.    TOTAL SERVICE TIME: 60 minutes  EVALUATION OF VOICE AND RESONANCE (65160)  NO CHARGE FACILITY FEE (76782)    Sunny Burnham M.M., M.A., CCC-SLP  Speech-Language Pathologist  Certificate of Vocology  [phone]    *this report was created in part through the use of computerized dictation software, and though reviewed following completion, some typographic errors may persist.  If there is confusion regarding any of this notes contents, please contact me for clarification.*      Again, thank you for allowing me to participate in the care of your patient.      Sincerely,    Speech Language Pathologist

## 2025-07-25 NOTE — PROGRESS NOTES
SPEECH LANGUAGE PATHOLOGY EVALUATION    Subjective        Presenting condition or subjective complaint:  Pt seen for swallow evaluation in conjunction with Dr. aPrekh's Multidisciplinary ENT clinic per her request.   Date of onset: 07/25/25    Relevant medical history:     Past Medical History:   Diagnosis Date    Acute asthma exacerbation 01/06/2020    Anxiety     Arthritis     Asthma exacerbation 11/19/2015    Chronic obstructive pulmonary disease, unspecified COPD type (H)     COPD (chronic obstructive pulmonary disease) (H)     Coronary artery disease due to lipid rich plaque     Depression     Diabetes (H)     Epigastric pain 12/15/2021    Essential hypertension     GERD (gastroesophageal reflux disease)     Infection due to 2019 novel coronavirus 01/03/2022    positive with COVID-19 on January 3, 2022    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    Microcytic anemia 11/17/2019    Motion sickness     PONV (postoperative nausea and vomiting)     S/P coronary artery stent placement 02/02/2018    Sleep apnea     TB lung, latent     9 mos INH       Dates & types of surgery:    Past Surgical History:   Procedure Laterality Date    CORONARY STENT PLACEMENT  2018    CV CORONARY ANGIOGRAM N/A 1/25/2018    Procedure: Coronary Angiogram;  Surgeon: Angelo Serrano MD;  Location: [facility] Cath Lab;  Service:     CV CORONARY ANGIOGRAM N/A 5/5/2022    Procedure: Coronary Angiogram;  Surgeon: Irwin Cano MD;  Location: [facility] CV    CV CORONARY ANGIOGRAM  5/5/2022    Procedure: ;  Surgeon: Irwin Cano MD;  Location: [facility] CV    ESOPHAGOSCOPY, GASTROSCOPY, DUODENOSCOPY (EGD), COMBINED N/A 11/21/2024    Procedure: ESOPHAGOGASTRODUODENOSCOPY with BIOPSY;  Surgeon: Gomez Belal MD;  Location: [facility] OR    CO ESOPHAGOGASTRODUODENOSCOPY TRANSORAL DIAGNOSTIC N/A 12/10/2018    Procedure: ESOPHAGOGASTRODUODENOSCOPY (EGD);  Surgeon: Edide Renteria MD;  Location: [facility] GI;  Service:  Gastroenterology    [place] ESOPHAGOGASTRODUODENOSCOPY TRANSORAL DIAGNOSTIC N/A 12/3/2020    Procedure: ESOPHAGOGASTRODUODENOSCOPY (EGD) with biospies ;  Surgeon: Avi Crow MD;  Location: [facility];  Service: Gastroenterology    [facility] COLONOSCOPY W/WO BRUSH/WASH N/A 12/10/2018    Procedure: COLONOSCOPY with polypectomy using biopsy forceps;  Surgeon: Eddie Renteria MD;  Location: [facility];  Service: Gastroenterology         Prior diagnostic imaging/testing results:     Video Swallow study 8/21/2020: safe functional swlalow.   Prior therapy history for the same diagnosis, illness or injury:     Pt has participated in voice therapy for Dysphonia    Pain assessment: Pain denied     Objective     SWALLOW EVALUTION  Dysphagia history:  Reports coughing on liquids every time she takes a drink of liquid. She feels foods sticking on the right. She has no recent history of pneumonia. Pt endorses reflux with symptoms well controlled. and hiatal hernia noted on EGD. She reports her cough is very bothersome and noisy.     Current Diet/Method of Nutritional Intake: thin liquids (level 0), regular diet        CLINICAL SWALLOW EVALUATION WITH ENDOSCOPIC VIEW PROVIDED BY ENT    Oral Motor Function: Dentition: adequate dentition  Secretion management: WFL  Mucosal quality: adequate  Mandibular function: intact  Oral labial function: WFL  Lingual function: WFL  Velar function: intact   Buccal function: intact  Laryngeal function: cough, throat clear, volitional swallow, voicing WFL     Level of assist required for feeding: no assistance needed   Textures Trialed:   Clinical Swallow Eval: Thin Liquids  Mode of presentation: straw   Volume presented: 4oz  Preparatory Phase: WFL  Oral Phase: WFL  Pharyngeal phase of swallow: intact   Diagnostic statement: PO trials evaluated under endoscopy completed by MD. No penetration/aspiration or significant residuals.     Clinical Swallow Eval: Purees  Mode of presentation: spoon    Volume presented: 3 tablespoons  Preparatory Phase: WFL  Oral Phase: WFL  Pharyngeal phase of swallow: intact   Diagnostic statement: PO trials evaluated under endoscopy completed by MD. No penetration/aspiration or significant residuals.     Clinical Swallow Eval: Solids  Mode of presentation: self-fed   Volume presented: 1 Deepali rose  Preparatory Phase: WFL  Oral Phase: WFL  Pharyngeal phase of swallow: intact   Diagnostic statement: PO trials evaluated under endoscopy completed by MD. No penetration/aspiration or significant residuals.     ESOPHAGEAL PHASE OF SWALLOW  Burping noted intermittently after po initials and during phonation tasks.     SWALLOW ASSESSMENT CLINICAL IMPRESSIONS AND RATIONALE  Diet Consistency Recommendations: thin liquids (level 0), regular diet    Recommended Feeding/Eating Techniques: small bolus size, alternate food and liquid intake, maintain upright sitting position for eating   Medication Administration Recommendations: Whole with liquid ok  Instrumental Assessment Recommendations: no further instrumental swallow exam indicated at this time.      Assessment & Plan   CLINICAL IMPRESSIONS   Medical Diagnosis: chronic cough    Treatment Diagnosis: safe functional oropharyngeal swallow   Impression/Assessment: Pt is a 55 year old female with dysphagia, breathing and dyphonia complaints. The following significant findings have been identified: safe functional swallow, characterized by no aspiration/penetration noted on consistencies presented. Adequate ROM and strength of oral mechanism demonstrated. No oral or pharyngeal residue after the completed swallow.  She did demonstrate coughing when swallowing liquids however this was not in relation to airway threat. She demonstrated burping intermittently after po trials indicating esophageal phase dysphagia and likely cause of the cough.       PLAN OF CARE  TRecommended Referrals to Other Professionals:   Education Assessment:    Learner/Method: Patient;No Barriers to Learning    Risks and benefits of evaluation/treatment have been explained.   Patient/Family/caregiver agrees with Plan of Care.     Evaluation Time:    SLP Eval: oral/pharyngeal swallow function, clinical minutes (27756): 15    Evaluation Only     Signing Clinician: TAJ Gan

## 2025-07-30 NOTE — TELEPHONE ENCOUNTER
RECORDS STATUS - ALL OTHER DIAGNOSIS      RECORDS RECEIVED FROM: [facility]   NOTES STATUS DETAILS   OFFICE NOTE from referring provider Epic Dr. Brunilda Parekh   DISCHARGE REPORT from the ER [facility] 7/8/2025 - [place] [facility]   MEDICATION LIST [facility]    LABS     PATHOLOGY REPORTS     ANYTHING RELATED TO DIAGNOSIS Epic 7/8/2025   IMAGING (NEED IMAGES & REPORT)     CT SCANS PACS CT Chest: 7/8/2025-6/27/2020   XRAYS PACS Xray Chest: 7/8/2025-1/6/2020

## 2025-08-04 ENCOUNTER — OFFICE VISIT (OUTPATIENT)
Dept: FAMILY MEDICINE | Facility: CLINIC | Age: 55
End: 2025-08-04
Payer: COMMERCIAL

## 2025-08-04 VITALS
RESPIRATION RATE: 16 BRPM | OXYGEN SATURATION: 99 % | WEIGHT: 124.56 LBS | SYSTOLIC BLOOD PRESSURE: 126 MMHG | HEART RATE: 99 BPM | DIASTOLIC BLOOD PRESSURE: 79 MMHG | TEMPERATURE: 98.2 F | BODY MASS INDEX: 24.45 KG/M2 | HEIGHT: 60 IN

## 2025-08-04 DIAGNOSIS — I25.83 CORONARY ARTERY DISEASE DUE TO LIPID RICH PLAQUE: ICD-10-CM

## 2025-08-04 DIAGNOSIS — J44.9 CHRONIC OBSTRUCTIVE PULMONARY DISEASE, UNSPECIFIED COPD TYPE (H): Chronic | ICD-10-CM

## 2025-08-04 DIAGNOSIS — I25.10 CORONARY ARTERY DISEASE DUE TO LIPID RICH PLAQUE: ICD-10-CM

## 2025-08-04 DIAGNOSIS — Z79.4 TYPE 2 DIABETES MELLITUS TREATED WITH INSULIN (H): ICD-10-CM

## 2025-08-04 DIAGNOSIS — E11.9 TYPE 2 DIABETES MELLITUS TREATED WITH INSULIN (H): ICD-10-CM

## 2025-08-04 DIAGNOSIS — R07.9 CHEST PAIN, UNSPECIFIED TYPE: Primary | ICD-10-CM

## 2025-08-04 LAB
EST. AVERAGE GLUCOSE BLD GHB EST-MCNC: 183 MG/DL
HBA1C MFR BLD: 8 % (ref 0–5.6)

## 2025-08-04 PROCEDURE — 99213 OFFICE O/P EST LOW 20 MIN: CPT | Performed by: FAMILY MEDICINE

## 2025-08-04 PROCEDURE — 3052F HG A1C>EQUAL 8.0%<EQUAL 9.0%: CPT | Performed by: FAMILY MEDICINE

## 2025-08-04 PROCEDURE — 3078F DIAST BP <80 MM HG: CPT | Performed by: FAMILY MEDICINE

## 2025-08-04 PROCEDURE — 36415 COLL VENOUS BLD VENIPUNCTURE: CPT | Performed by: FAMILY MEDICINE

## 2025-08-04 PROCEDURE — 3074F SYST BP LT 130 MM HG: CPT | Performed by: FAMILY MEDICINE

## 2025-08-04 PROCEDURE — 83036 HEMOGLOBIN GLYCOSYLATED A1C: CPT | Performed by: FAMILY MEDICINE

## 2025-08-12 ENCOUNTER — OFFICE VISIT (OUTPATIENT)
Dept: PHARMACY | Facility: CLINIC | Age: 55
End: 2025-08-12
Payer: COMMERCIAL

## 2025-08-12 VITALS
SYSTOLIC BLOOD PRESSURE: 116 MMHG | HEART RATE: 102 BPM | BODY MASS INDEX: 24.22 KG/M2 | WEIGHT: 124 LBS | DIASTOLIC BLOOD PRESSURE: 70 MMHG | OXYGEN SATURATION: 99 %

## 2025-08-12 DIAGNOSIS — R51.9 CHRONIC NONINTRACTABLE HEADACHE, UNSPECIFIED HEADACHE TYPE: ICD-10-CM

## 2025-08-12 DIAGNOSIS — Z79.4 TYPE 2 DIABETES MELLITUS TREATED WITH INSULIN (H): Primary | ICD-10-CM

## 2025-08-12 DIAGNOSIS — E11.9 TYPE 2 DIABETES MELLITUS TREATED WITH INSULIN (H): Primary | ICD-10-CM

## 2025-08-12 DIAGNOSIS — K21.9 GASTROESOPHAGEAL REFLUX DISEASE WITHOUT ESOPHAGITIS: ICD-10-CM

## 2025-08-12 DIAGNOSIS — Z78.9 TAKES DIETARY SUPPLEMENTS: ICD-10-CM

## 2025-08-12 DIAGNOSIS — M54.41 CHRONIC BILATERAL LOW BACK PAIN WITH BILATERAL SCIATICA: ICD-10-CM

## 2025-08-12 DIAGNOSIS — Z76.0 ENCOUNTER FOR MEDICATION REFILL: ICD-10-CM

## 2025-08-12 DIAGNOSIS — M25.561 PAIN IN BOTH KNEES, UNSPECIFIED CHRONICITY: ICD-10-CM

## 2025-08-12 DIAGNOSIS — R42 DIZZINESS: ICD-10-CM

## 2025-08-12 DIAGNOSIS — F33.9 RECURRENT MAJOR DEPRESSIVE DISORDER, REMISSION STATUS UNSPECIFIED: ICD-10-CM

## 2025-08-12 DIAGNOSIS — E55.9 VITAMIN D DEFICIENCY: ICD-10-CM

## 2025-08-12 DIAGNOSIS — M54.42 CHRONIC BILATERAL LOW BACK PAIN WITH BILATERAL SCIATICA: ICD-10-CM

## 2025-08-12 DIAGNOSIS — K59.00 CONSTIPATION, UNSPECIFIED CONSTIPATION TYPE: ICD-10-CM

## 2025-08-12 DIAGNOSIS — M25.562 PAIN IN BOTH KNEES, UNSPECIFIED CHRONICITY: ICD-10-CM

## 2025-08-12 DIAGNOSIS — E78.5 HYPERLIPIDEMIA, UNSPECIFIED HYPERLIPIDEMIA TYPE: ICD-10-CM

## 2025-08-12 DIAGNOSIS — G89.29 CHRONIC BILATERAL LOW BACK PAIN WITH BILATERAL SCIATICA: ICD-10-CM

## 2025-08-12 DIAGNOSIS — I10 ESSENTIAL HYPERTENSION: ICD-10-CM

## 2025-08-12 DIAGNOSIS — G89.29 CHRONIC NONINTRACTABLE HEADACHE, UNSPECIFIED HEADACHE TYPE: ICD-10-CM

## 2025-08-12 PROCEDURE — 3074F SYST BP LT 130 MM HG: CPT | Performed by: PHARMACIST

## 2025-08-12 PROCEDURE — 3078F DIAST BP <80 MM HG: CPT | Performed by: PHARMACIST

## 2025-08-12 PROCEDURE — 99606 MTMS BY PHARM EST 15 MIN: CPT | Performed by: PHARMACIST

## 2025-08-12 PROCEDURE — 99607 MTMS BY PHARM ADDL 15 MIN: CPT | Performed by: PHARMACIST

## 2025-08-12 RX ORDER — PEN NEEDLE, DIABETIC 32GX 5/32"
NEEDLE, DISPOSABLE MISCELLANEOUS
Qty: 300 EACH | Refills: 3 | Status: SHIPPED | OUTPATIENT
Start: 2025-08-12

## 2025-08-12 RX ORDER — SWAB
1 SWAB, NON-MEDICATED MISCELLANEOUS DAILY
Qty: 90 CAPSULE | Refills: 3 | Status: SHIPPED | OUTPATIENT
Start: 2025-08-12

## 2025-08-12 RX ORDER — OMEGA-3S/DHA/EPA/FISH OIL/D3 300MG-1000
CAPSULE ORAL DAILY
Qty: 90 TABLET | Refills: 0 | OUTPATIENT
Start: 2025-08-12

## 2025-08-13 RX ORDER — GABAPENTIN 600 MG/1
TABLET ORAL
Qty: 90 TABLET | Refills: 3 | Status: SHIPPED | OUTPATIENT
Start: 2025-08-13

## 2025-08-15 ENCOUNTER — APPOINTMENT (OUTPATIENT)
Dept: RADIOLOGY | Facility: HOSPITAL | Age: 55
End: 2025-08-15
Payer: COMMERCIAL

## 2025-08-15 PROCEDURE — 71046 X-RAY EXAM CHEST 2 VIEWS: CPT

## 2025-09-03 ENCOUNTER — TELEPHONE (OUTPATIENT)
Dept: PULMONOLOGY | Facility: CLINIC | Age: 55
End: 2025-09-03
Payer: COMMERCIAL

## 2025-09-03 DIAGNOSIS — R06.2 WHEEZING: ICD-10-CM

## 2025-09-03 DIAGNOSIS — R05.1 ACUTE COUGH: ICD-10-CM

## 2025-09-03 DIAGNOSIS — J45.51 SEVERE PERSISTENT ASTHMA WITH EXACERBATION (H): Primary | ICD-10-CM

## 2025-09-03 RX ORDER — PREDNISONE 10 MG/1
TABLET ORAL
Qty: 30 TABLET | Refills: 0 | Status: SHIPPED | OUTPATIENT
Start: 2025-09-03

## 2025-09-04 ENCOUNTER — OFFICE VISIT (OUTPATIENT)
Dept: SLEEP MEDICINE | Facility: CLINIC | Age: 55
End: 2025-09-04
Payer: COMMERCIAL

## 2025-09-04 VITALS
BODY MASS INDEX: 24.66 KG/M2 | DIASTOLIC BLOOD PRESSURE: 92 MMHG | WEIGHT: 125.6 LBS | HEART RATE: 98 BPM | OXYGEN SATURATION: 100 % | SYSTOLIC BLOOD PRESSURE: 130 MMHG | HEIGHT: 60 IN

## 2025-09-04 DIAGNOSIS — I10 ESSENTIAL HYPERTENSION: ICD-10-CM

## 2025-09-04 DIAGNOSIS — G47.33 OSA (OBSTRUCTIVE SLEEP APNEA): Primary | Chronic | ICD-10-CM

## 2025-09-04 ASSESSMENT — SLEEP AND FATIGUE QUESTIONNAIRES
HOW LIKELY ARE YOU TO NOD OFF OR FALL ASLEEP WHILE SITTING QUIETLY AFTER LUNCH WITHOUT ALCOHOL: WOULD NEVER DOZE
HOW LIKELY ARE YOU TO NOD OFF OR FALL ASLEEP WHILE SITTING AND TALKING TO SOMEONE: WOULD NEVER DOZE
HOW LIKELY ARE YOU TO NOD OFF OR FALL ASLEEP IN A CAR, WHILE STOPPED FOR A FEW MINUTES IN TRAFFIC: WOULD NEVER DOZE
HOW LIKELY ARE YOU TO NOD OFF OR FALL ASLEEP WHILE LYING DOWN TO REST IN THE AFTERNOON WHEN CIRCUMSTANCES PERMIT: WOULD NEVER DOZE
HOW LIKELY ARE YOU TO NOD OFF OR FALL ASLEEP WHILE SITTING AND READING: WOULD NEVER DOZE
HOW LIKELY ARE YOU TO NOD OFF OR FALL ASLEEP WHEN YOU ARE A PASSENGER IN A CAR FOR AN HOUR WITHOUT A BREAK: WOULD NEVER DOZE
HOW LIKELY ARE YOU TO NOD OFF OR FALL ASLEEP WHILE SITTING INACTIVE IN A PUBLIC PLACE: WOULD NEVER DOZE
HOW LIKELY ARE YOU TO NOD OFF OR FALL ASLEEP WHILE WATCHING TV: WOULD NEVER DOZE

## 2025-09-24 ENCOUNTER — PRE VISIT (OUTPATIENT)
Dept: PULMONOLOGY | Facility: CLINIC | Age: 55
End: 2025-09-24

## (undated) DEVICE — CATH DIAG 4FR JR 5.0 538423

## (undated) DEVICE — FORCEP BIOPSY DISP 000386

## (undated) DEVICE — MANIFOLD KIT ANGIO AUTOMATED 014613

## (undated) DEVICE — SHEATH GLIDE RADIAL 4FR 25CM 0.021

## (undated) DEVICE — SYR ANGIOGRAPHY MULTIUSE KIT ACIST 014612

## (undated) DEVICE — CATH ANGIO INFINITI JL3.5 4FRX100CM 538418

## (undated) DEVICE — SUCTION MANIFOLD NEPTUNE 2 SYS 1 PORT 702-025-000

## (undated) DEVICE — INTRO MICRO MINI STICK 4FR STD NITINOL

## (undated) DEVICE — ELECTRODE ADULT PACING MULTI P-211-M1

## (undated) DEVICE — EXCHANGE WIRE .035 260 STAR/JFC/035/260/ M001491681

## (undated) DEVICE — KIT HAND CONTROL ACIST 014644 AR-P54

## (undated) DEVICE — CUSTOM PACK CORONARY SAN5BCRHEA

## (undated) DEVICE — TUBING SUCTION MEDI-VAC 1/4"X20' N620A

## (undated) DEVICE — SOL WATER IRRIG 1000ML BOTTLE 2F7114

## (undated) DEVICE — SLEEVE TR BAND RADIAL COMPRESSION DEVICE 24CM TRB24-REG

## (undated) DEVICE — GUIDEWIRE ROSEN CVD .035X180CM J-TIP G01264

## (undated) RX ORDER — ASPIRIN 81 MG/1
TABLET, CHEWABLE ORAL
Status: DISPENSED
Start: 2022-05-05

## (undated) RX ORDER — NITROGLYCERIN 5 MG/ML
VIAL (ML) INTRAVENOUS
Status: DISPENSED
Start: 2022-05-05

## (undated) RX ORDER — ACETAMINOPHEN 325 MG/1
TABLET ORAL
Status: DISPENSED
Start: 2022-05-05

## (undated) RX ORDER — HEPARIN SODIUM 1000 [USP'U]/ML
INJECTION, SOLUTION INTRAVENOUS; SUBCUTANEOUS
Status: DISPENSED
Start: 2022-05-05

## (undated) RX ORDER — DIAZEPAM 5 MG
TABLET ORAL
Status: DISPENSED
Start: 2022-05-05

## (undated) RX ORDER — FENTANYL CITRATE 50 UG/ML
INJECTION, SOLUTION INTRAMUSCULAR; INTRAVENOUS
Status: DISPENSED
Start: 2022-05-05

## (undated) RX ORDER — NICARDIPINE HCL-0.9% SOD CHLOR 1 MG/10 ML
SYRINGE (ML) INTRAVENOUS
Status: DISPENSED
Start: 2022-05-05

## (undated) RX ORDER — NITROGLYCERIN 0.4 MG/1
TABLET SUBLINGUAL
Status: DISPENSED
Start: 2022-05-05